# Patient Record
Sex: FEMALE | Race: WHITE | NOT HISPANIC OR LATINO | Employment: OTHER | URBAN - METROPOLITAN AREA
[De-identification: names, ages, dates, MRNs, and addresses within clinical notes are randomized per-mention and may not be internally consistent; named-entity substitution may affect disease eponyms.]

---

## 2017-01-18 ENCOUNTER — PATIENT MESSAGE (OUTPATIENT)
Dept: INTERNAL MEDICINE | Facility: CLINIC | Age: 72
End: 2017-01-18

## 2017-01-18 ENCOUNTER — OFFICE VISIT (OUTPATIENT)
Dept: INTERNAL MEDICINE | Facility: CLINIC | Age: 72
End: 2017-01-18
Payer: MEDICARE

## 2017-01-18 VITALS
OXYGEN SATURATION: 93 % | BODY MASS INDEX: 31.17 KG/M2 | HEART RATE: 100 BPM | SYSTOLIC BLOOD PRESSURE: 124 MMHG | WEIGHT: 182.56 LBS | HEIGHT: 64 IN | DIASTOLIC BLOOD PRESSURE: 74 MMHG

## 2017-01-18 DIAGNOSIS — E78.5 HYPERLIPIDEMIA, UNSPECIFIED HYPERLIPIDEMIA TYPE: ICD-10-CM

## 2017-01-18 DIAGNOSIS — N18.30 CKD (CHRONIC KIDNEY DISEASE) STAGE 3, GFR 30-59 ML/MIN: Primary | ICD-10-CM

## 2017-01-18 DIAGNOSIS — I70.0 AORTIC ATHEROSCLEROSIS: ICD-10-CM

## 2017-01-18 PROCEDURE — 1159F MED LIST DOCD IN RCRD: CPT | Mod: S$GLB,,, | Performed by: INTERNAL MEDICINE

## 2017-01-18 PROCEDURE — 99499 UNLISTED E&M SERVICE: CPT | Mod: S$GLB,,, | Performed by: INTERNAL MEDICINE

## 2017-01-18 PROCEDURE — 3074F SYST BP LT 130 MM HG: CPT | Mod: S$GLB,,, | Performed by: INTERNAL MEDICINE

## 2017-01-18 PROCEDURE — 1160F RVW MEDS BY RX/DR IN RCRD: CPT | Mod: S$GLB,,, | Performed by: INTERNAL MEDICINE

## 2017-01-18 PROCEDURE — 82570 ASSAY OF URINE CREATININE: CPT

## 2017-01-18 PROCEDURE — 99999 PR PBB SHADOW E&M-EST. PATIENT-LVL III: CPT | Mod: PBBFAC,,, | Performed by: INTERNAL MEDICINE

## 2017-01-18 PROCEDURE — 3078F DIAST BP <80 MM HG: CPT | Mod: S$GLB,,, | Performed by: INTERNAL MEDICINE

## 2017-01-18 PROCEDURE — 1125F AMNT PAIN NOTED PAIN PRSNT: CPT | Mod: S$GLB,,, | Performed by: INTERNAL MEDICINE

## 2017-01-18 PROCEDURE — 1157F ADVNC CARE PLAN IN RCRD: CPT | Mod: S$GLB,,, | Performed by: INTERNAL MEDICINE

## 2017-01-18 PROCEDURE — 99213 OFFICE O/P EST LOW 20 MIN: CPT | Mod: S$GLB,,, | Performed by: INTERNAL MEDICINE

## 2017-01-18 RX ORDER — FLUTICASONE PROPIONATE 50 MCG
SPRAY, SUSPENSION (ML) NASAL
Qty: 16 G | Refills: 12
Start: 2017-01-18 | End: 2018-02-14 | Stop reason: ALTCHOICE

## 2017-01-18 NOTE — MR AVS SNAPSHOT
[facility] Internal Medicine   [place] [address]  Phone: [phone]  Fax: [phone]                  Misa Barajas   2017 8:40 AM   Office Visit    Description:  Female : 1945   Provider:  Lang Cortez MD   Department:  [facility] Medicine           Reason for Visit     Hypertension     Sinus Drip     Nausea           Diagnoses this Visit        Comments    CKD (chronic kidney disease) stage 3, GFR 30-59 ml/min    -  Primary            To Do List           Future Appointments        Provider Department Dept Phone    2017 10:00 AM [facility] [id number] 485 LB LIMIT [facility] [phone]    2017 8:00 AM [facility] [phone]    2017 9:00 AM Lang Cortez MD [facility] [phone]      Goals (5 Years of Data)     None       These Medications        Disp Refills Start End    fluticasone (FLONASE) 50 mcg/actuation nasal spray 16 g 12 2017     2 sprays once daily.  Use as directed    Pharmacy: [facility] [place] [address] Ph #: [phone]         [facility]     [facility] Care [facility] -  Assistance  Registered nurses in the [facility] provide clinical advisement, health education, appointment booking, and other advisory services.  Call for this free service at [phone].             Medications           Message regarding Medications     Verify the changes and/or additions to your medication regime listed below are the same as discussed with your clinician today.  If any of these changes or additions are incorrect, please notify your healthcare provider.        START taking these NEW medications        Refills    fluticasone (FLONASE) 50 mcg/actuation nasal spray 12    Si sprays once daily.  Use as directed    Class: No Print           Verify that the below list of medications is an accurate  "representation of the medications you are currently taking.  If none reported, the list may be blank. If incorrect, please contact your healthcare provider. Carry this list with you in case of emergency.           Current Medications     acetaminophen (TYLENOL) 500 MG tablet Take 1 tablet (500 mg total) by mouth every 6 (six) hours as needed for Pain.    amlodipine (NORVASC) 5 MG tablet Take 1 tablet (5 mg total) by mouth once daily.    cholecalciferol, vitamin D3, 1,000 unit capsule Take 2 capsules (2,000 Units total) by mouth once daily.    fluticasone (FLONASE) 50 mcg/actuation nasal spray 2 sprays once daily.  Use as directed    losartan (COZAAR) 50 MG tablet Take 1 tablet (50 mg total) by mouth once daily.           Clinical Reference Information           Vital Signs - Last Recorded  Most recent update: 1/18/2017  8:51 AM by Lucero Kelly MA    BP Pulse Ht Wt SpO2 BMI    124/74 (BP Location: Right arm, Patient Position: Sitting, BP Method: Manual) 100 5' 4" (1.626 m) 82.8 kg (182 lb 8.7 oz) (!) 93% 31.33 kg/m2      Blood Pressure          Most Recent Value    BP  124/74      Allergies as of 1/18/2017     Adhesive      Immunizations Administered on Date of Encounter - 1/18/2017     None      Orders Placed During Today's Visit      Normal Orders This Visit    MICROALBUMIN / CREATININE RATIO URINE     Future Labs/Procedures Expected by Expires    Comprehensive metabolic panel  1/18/2017 4/18/2017    PHOSPHORUS  1/18/2017 3/19/2018    PTH, intact  1/18/2017 4/18/2017      Instructions      How to use your Flonase   - flonase is not a very good "as needed medicine" for nasal congestion  - it works best when you use it for a period of at least 7 days in A ROW.   - I want you to take this for 14 days in a row then take a 1 week break.   - After 1 week break, if congestion returns, you can start for another 14 days.   - Always take a 7 day break between uses so you don't get side effects like nose bleed or " sore throat.   - Gargle after each use of Flonase and spit out the water     Decongestants   - do not use daily, this can make your congestion worse over time   - for rapid relief of allergy congestion you can try something like Claritin-D with a decongestant. Afrin. Psuedophed. Phenylerphrine   - this is for rapid relief and should only be used as needed, less than 4 days per week.      NASAL SALINE   Washing the nasal cavities with saline reduces postnasal drainage, removes secretions, and rinses away allergens and irritants.     Saline washes can be used immediately prior to administration of other intranasal medications so that the mucosa is freshly cleansed when the medications are introduced    You can use this daily, or multiple times daily, depending on the severity of symptoms      Reasons to Call Clinic   · Start to have headache, facial pain, yellow mucous, fevers.

## 2017-01-18 NOTE — PATIENT INSTRUCTIONS
"  How to use your Flonase   - flonase is not a very good "as needed medicine" for nasal congestion  - it works best when you use it for a period of at least 7 days in A ROW.   - I want you to take this for 14 days in a row then take a 1 week break.   - After 1 week break, if congestion returns, you can start for another 14 days.   - Always take a 7 day break between uses so you don't get side effects like nose bleed or sore throat.   - Gargle after each use of Flonase and spit out the water     Decongestants   - do not use daily, this can make your congestion worse over time   - for rapid relief of allergy congestion you can try something like Claritin-D with a decongestant. Afrin. Psuedophed. Phenylerphrine   - this is for rapid relief and should only be used as needed, less than 4 days per week.      NASAL SALINE   Washing the nasal cavities with saline reduces postnasal drainage, removes secretions, and rinses away allergens and irritants.     Saline washes can be used immediately prior to administration of other intranasal medications so that the mucosa is freshly cleansed when the medications are introduced    You can use this daily, or multiple times daily, depending on the severity of symptoms      Reasons to Call Clinic   · Start to have headache, facial pain, yellow mucous, fevers.       "

## 2017-01-19 ENCOUNTER — PATIENT MESSAGE (OUTPATIENT)
Dept: INTERNAL MEDICINE | Facility: CLINIC | Age: 72
End: 2017-01-19

## 2017-01-19 NOTE — PROGRESS NOTES
Portions of this note are generated with voice recognition software. Typographical errors may exist.     SUBJECTIVE:    This is a/an 71 y.o. female here for primary care visit for  Chief Complaint   Patient presents with    Hypertension     follow up    Sinus Drip    Nausea     Sinusitis.  Patient states that she continues to suffer with postnasal drip and nausea.  States his symptoms started approximately around New Year's Sharee when she had acute upper respiratory tract infection and has had persisting symptoms since then.  She states that she does not have fevers but she has mild chills in the daytime and she worries that this might be related to persisting infection.    Hyperlipidemia.  Patient states that she did not investigate statin therapy and as such she is pre-contemplative about pursuing this therapy.      Medications Reviewed and Updated    Past medical, family, and social histories were reviewed and updated.    Review of Systems negative unless noted otherwise in history of present illness-  General ROS: negative  Allergy and Immunology ROS: negative  Hematological and Lymphatic ROS: negative  Respiratory ROS: negative    Allergic:    Review of patient's allergies indicates:   Allergen Reactions    Adhesive Rash     Pt states she removed a LATEX bandaid and the skin beneath was swollen and red.  She believe it could be a latex allergy.       OBJECTIVE:  BP: 124/74 Pulse: 100    Wt Readings from Last 3 Encounters:   01/18/17 82.8 kg (182 lb 8.7 oz)   11/18/16 83.5 kg (184 lb 1.4 oz)   11/09/16 83 kg (182 lb 15.7 oz)    Body mass index is 31.33 kg/(m^2).  Previous Blood Pressure Readings :   BP Readings from Last 3 Encounters:   01/18/17 124/74   11/18/16 136/78   11/09/16 120/70     GEN: No apparent distress  HEENT: sclera non-icteric, conjunctiva clear significant sinus pain on palpation.  Mildly edematous turbinates.  No polyposis.  CV: no peripheral edema  PULM: breathing non-labored no wheezing or  "crackles bilateral lung fields.  ABD: Obese, protuberant abdomen.  PSYCH: appropriate affect  MSK: able to rise from chair without assistance  SKIN: normal skin turgor    Pertinent Labs Reviewed       ASSESSMENT/PLAN:    Chronic rhinosinusitis.  Clinical examination reassuring.  Bacterial rhinosinusitis seems less likely.  "Chills" during the day don't appear compatible with bacterial process.  Recommend nasal corticosteroid therapy.  Chest x-ray to exclude pneumonitis.    Hyperlipidemia.  With aortic atherosclerosis Not optimally controlled.  Detailed counseling on statin therapy.  Patient will ponder this possibility further.    Future Appointments  Date Time Provider Department Center   2/16/2017 10:00 AM [facility] [id number] 485 LB LIMIT [facility] [place]   4/13/2017 8:00 AM [facility] [place]   4/18/2017 9:00 [facility]  1/18/2017  6:59 PM      "

## 2017-02-16 ENCOUNTER — HOSPITAL ENCOUNTER (OUTPATIENT)
Dept: RADIOLOGY | Facility: HOSPITAL | Age: 72
Discharge: HOME | End: 2017-02-16
Attending: UROLOGY
Payer: MEDICARE

## 2017-02-16 DIAGNOSIS — N20.0 NEPHROLITHIASIS: ICD-10-CM

## 2017-02-16 PROCEDURE — 74000 XR ABDOMEN AP 1 VIEW: CPT | Mod: TC

## 2017-02-16 PROCEDURE — 76770 US EXAM ABDO BACK WALL COMP: CPT | Mod: 26,,, | Performed by: RADIOLOGY

## 2017-02-16 PROCEDURE — 74000 XR ABDOMEN AP 1 VIEW: CPT | Mod: 26,,, | Performed by: RADIOLOGY

## 2017-02-16 PROCEDURE — 76770 US EXAM ABDO BACK WALL COMP: CPT | Mod: TC

## 2017-02-20 ENCOUNTER — PATIENT MESSAGE (OUTPATIENT)
Dept: UROLOGY | Facility: CLINIC | Age: 72
End: 2017-02-20

## 2017-02-20 ENCOUNTER — TELEPHONE (OUTPATIENT)
Dept: UROLOGY | Facility: CLINIC | Age: 72
End: 2017-02-20

## 2017-02-23 ENCOUNTER — PATIENT MESSAGE (OUTPATIENT)
Dept: UROLOGY | Facility: CLINIC | Age: 72
End: 2017-02-23

## 2017-04-04 ENCOUNTER — OFFICE VISIT (OUTPATIENT)
Dept: UROLOGY | Facility: CLINIC | Age: 72
End: 2017-04-04
Payer: MEDICARE

## 2017-04-04 ENCOUNTER — TELEPHONE (OUTPATIENT)
Dept: UROLOGY | Facility: CLINIC | Age: 72
End: 2017-04-04

## 2017-04-04 VITALS
SYSTOLIC BLOOD PRESSURE: 128 MMHG | HEART RATE: 84 BPM | HEIGHT: 64 IN | DIASTOLIC BLOOD PRESSURE: 79 MMHG | WEIGHT: 184.5 LBS | BODY MASS INDEX: 31.5 KG/M2

## 2017-04-04 DIAGNOSIS — N20.0 KIDNEY STONES: Primary | ICD-10-CM

## 2017-04-04 DIAGNOSIS — N20.0 NEPHROLITHIASIS: ICD-10-CM

## 2017-04-04 DIAGNOSIS — N18.30 CKD (CHRONIC KIDNEY DISEASE) STAGE 3, GFR 30-59 ML/MIN: Primary | ICD-10-CM

## 2017-04-04 DIAGNOSIS — N25.0 RENAL OSTEODYSTROPHY: ICD-10-CM

## 2017-04-04 DIAGNOSIS — E55.9 VITAMIN D DEFICIENCY: ICD-10-CM

## 2017-04-04 DIAGNOSIS — R82.89 ABNORMAL URINE CYTOLOGY: ICD-10-CM

## 2017-04-04 LAB
BILIRUB SERPL-MCNC: NORMAL MG/DL
BLOOD URINE, POC: 250
COLOR, POC UA: NORMAL
GLUCOSE UR QL STRIP: NORMAL
KETONES UR QL STRIP: NORMAL
LEUKOCYTE ESTERASE URINE, POC: NORMAL
NITRITE, POC UA: NORMAL
PH, POC UA: 6
PROTEIN, POC: 100
SPECIFIC GRAVITY, POC UA: 1.01
UROBILINOGEN, POC UA: NORMAL

## 2017-04-04 PROCEDURE — 81001 URINALYSIS AUTO W/SCOPE: CPT | Mod: S$GLB,,, | Performed by: UROLOGY

## 2017-04-04 PROCEDURE — 88112 CYTOPATH CELL ENHANCE TECH: CPT

## 2017-04-04 PROCEDURE — 99499 UNLISTED E&M SERVICE: CPT | Mod: S$GLB,,, | Performed by: UROLOGY

## 2017-04-04 PROCEDURE — 99999 PR PBB SHADOW E&M-EST. PATIENT-LVL III: CPT | Mod: PBBFAC,,, | Performed by: UROLOGY

## 2017-04-04 PROCEDURE — 87086 URINE CULTURE/COLONY COUNT: CPT

## 2017-04-04 PROCEDURE — 1126F AMNT PAIN NOTED NONE PRSNT: CPT | Mod: S$GLB,,, | Performed by: UROLOGY

## 2017-04-04 PROCEDURE — 1157F ADVNC CARE PLAN IN RCRD: CPT | Mod: S$GLB,,, | Performed by: UROLOGY

## 2017-04-04 PROCEDURE — 1159F MED LIST DOCD IN RCRD: CPT | Mod: S$GLB,,, | Performed by: UROLOGY

## 2017-04-04 PROCEDURE — 3078F DIAST BP <80 MM HG: CPT | Mod: S$GLB,,, | Performed by: UROLOGY

## 2017-04-04 PROCEDURE — 99214 OFFICE O/P EST MOD 30 MIN: CPT | Mod: 25,S$GLB,, | Performed by: UROLOGY

## 2017-04-04 PROCEDURE — 3074F SYST BP LT 130 MM HG: CPT | Mod: S$GLB,,, | Performed by: UROLOGY

## 2017-04-04 PROCEDURE — 88112 CYTOPATH CELL ENHANCE TECH: CPT | Mod: 26,,, | Performed by: PATHOLOGY

## 2017-04-04 PROCEDURE — 1160F RVW MEDS BY RX/DR IN RCRD: CPT | Mod: S$GLB,,, | Performed by: UROLOGY

## 2017-04-04 NOTE — PROGRESS NOTES
"Subjective:       Patient ID: Misa Barajas is a 71 y.o. Female here for follow up kidney stones    Chief Complaint: kidney stones    HPI Comments: Misa Barajas is a 71 y.o. Female here for follow up of kidney stones.  Films today personally reviewed.   Has increasing stone burden on right.   Ultrasound 2/17  "5 calculi within the right kidney measuring up to 9 mm, and one 5mm calculus within the left kidney, increased in number on the right and decreased in number on the left compared to 2015."  KUB   "Ossifications overlying both kidneys, RIGHT greater and number than LEFT presumably renal calculi."  KUB 2014 showed 5mm stone in left lower pole, stable.   KUB 10/15 shows 2 stones on left and multiple stones on right, largest is 7mm and 5mm.   KUB 1/16 and 7/16 showed multiple stones on right. Stable. No increase.       No urinary frequency or urgency. Gross hematuria x 1 recently. Painless. No incontinence.  Nocturia x 1.  Patient followed by Dr. Ross    6/14 bilateral ureteroscopy, then another 6/14 right ureteroscopy to clear right stone burden.  Repeat 24 hour urine showed high oxalate and sodium.  She has limited her diet in sodium.    Last 24 hour urine showed borderline elevated oxalate.   Stones were calcium oxalate monohydrate, althought HF were 500-600.    Had a biopsy of right ureteral area where stones were impacted. Biopsy was negative.     Former smoker. Quit 5 years ago.  1 pack per day.    KUB 2014 showed 5mm stone in left lower pole, stable.   KUB 10/15 shows 2 stones on left and multiple stones on right, largest is 7mm and 5mm.   KUB 1/16 and 7/16 showed multiple stones on right. Stable. No increase.     Films personally reviewed.       Past Medical History:   Diagnosis Date    Arthritis     knee joint pain    Breast cancer     left breast & lymph nodes-s/p sx with chemo    Bronchitis     with flu-2/2014    Cataracts, bilateral     History of chemotherapy     last treatment 12/2002 " (had 8 treatments)    Hyperlipidemia 11/20/2016    Hypertension     Renal calculi     hematuria       Past Surgical History:   Procedure Laterality Date    BREAST SURGERY Left 2003    Mastectomy    LITHOTRIPSY      MASTECTOMY      left-& lymph node dissection    ureteroscopy Bilateral     6.14       Family History   Problem Relation Age of Onset    Cancer Mother      breast cancer    Bone cancer Mother     Breast cancer Mother     Arthritis Mother     Cancer Sister      breast cancer    Breast cancer Sister     Cancer Father      LUNG    No Known Problems Brother     Fibroids Daughter     No Known Problems Daughter     Anesthesia problems Neg Hx     Glaucoma Neg Hx        Social History     Social History    Marital status:      Spouse name: N/A    Number of children: N/A    Years of education: N/A     Occupational History    Not on file.     Social History Main Topics    Smoking status: Former Smoker     Packs/day: 1.00     Years: 50.00     Types: Cigarettes     Quit date: 5/20/2009    Smokeless tobacco: Never Used    Alcohol use 0.6 oz/week     1 Glasses of wine per week      Comment: rarely    Drug use: No    Sexual activity: Not Currently     Partners: Male     Other Topics Concern    Not on file     Social History Narrative       Allergies:  Adhesive    Medications:    Current Outpatient Prescriptions:     acetaminophen (TYLENOL) 500 MG tablet, Take 1 tablet (500 mg total) by mouth every 6 (six) hours as needed for Pain., Disp: , Rfl: 0    amlodipine (NORVASC) 5 MG tablet, Take 1 tablet (5 mg total) by mouth once daily., Disp: 30 tablet, Rfl: 11    cholecalciferol, vitamin D3, 1,000 unit capsule, Take 2 capsules (2,000 Units total) by mouth once daily., Disp: , Rfl: 0    losartan (COZAAR) 50 MG tablet, Take 1 tablet (50 mg total) by mouth once daily., Disp: 90 tablet, Rfl: 3    fluticasone (FLONASE) 50 mcg/actuation nasal spray, 2 sprays once daily.  Use as directed,  Disp: 16 g, Rfl: 12        Review of Systems   Constitutional: Negative for fever and chills.   HENT: Negative for hearing loss and nosebleeds.    Eyes: Negative for visual disturbance.   Respiratory: Negative for chest tightness and shortness of breath.    Cardiovascular: Negative for chest pain.   Gastrointestinal: Negative for nausea, vomiting, constipation and blood in stool.   Genitourinary: Negative  for hematuria. Negative for dysuria, urgency and frequency.   Musculoskeletal: Negative for gait problem.   Skin: Negative for rash.   Neurological: Negative for seizures and numbness.   Hematological: Does not bruise/bleed easily.   Psychiatric/Behavioral: Negative for agitation.       Objective:      Physical Exam   Vitals reviewed.  Constitutional: She is oriented to person, place, and time. She appears well-developed and well-nourished.   HENT:   Head: Normocephalic and atraumatic.   Eyes: No scleral icterus.   Cardiovascular: Normal rate and regular rhythm.    Pulmonary/Chest: Effort normal. No respiratory distress.   Abdominal: She exhibits no mass.   No cva ttp.   Musculoskeletal: She exhibits no tenderness.   Lymphadenopathy:     She has no cervical adenopathy.   Neurological: She is alert and oriented to person, place, and time.   Skin: Skin is warm and dry. No rash noted.   Psychiatric: She has a normal mood and affect.     Urine dip 1+ leuk, 250 blood, 100 protein  Assessment:             1. Kidney stones  2. Renal insufficiency  3. Obesity  H/o of breast cancer  CKD stage 3  HTN  Plan:   Staged right eswl. All options given. (HF was 500-600, despite ca oxal monohydrate).  The patient is scheduled for ESWL. The risks and benefits of extracorporeal shock wave lithotripsy were discussed with the patient in detail.  Consent was obtained.  The risks include but are not limited to bleeding in or around the kidney, infection, pain, incomplete fragmentation of the stone requiring a repeat treatment or a  different form of treatment, obstruction of the kidney by stone particles possibly requiring a ureteral stent or nephrostomy tube, injury to or loss of the kidney ,injury to the ureter or bladder, need for further procedures, hypertension (transient or permanent), recurrence of stones, and need for open surgery.  Alternative treatments were also discussed with the patient in detail to include ureteroscopy, percutaneous treatment of the stone, open surgery  and observation. Patient understands these risks and has agreed to proceed with surgery.      Stop asa 1 week prior.   Urine culture. Repeat urine cytology.     I spent 25 minutes with the patient of which more than half was spent in direct consultation with the patient in regards to our treatment and plan.

## 2017-04-05 LAB — BACTERIA UR CULT: NORMAL

## 2017-04-13 ENCOUNTER — LAB VISIT (OUTPATIENT)
Dept: LAB | Facility: HOSPITAL | Age: 72
End: 2017-04-13
Attending: INTERNAL MEDICINE
Payer: MEDICARE

## 2017-04-13 DIAGNOSIS — N18.30 CKD (CHRONIC KIDNEY DISEASE) STAGE 3, GFR 30-59 ML/MIN: ICD-10-CM

## 2017-04-13 LAB
ALBUMIN SERPL BCP-MCNC: 3.5 G/DL
ALP SERPL-CCNC: 83 U/L
ALT SERPL W/O P-5'-P-CCNC: 19 U/L
ANION GAP SERPL CALC-SCNC: 8 MMOL/L
AST SERPL-CCNC: 23 U/L
BILIRUB SERPL-MCNC: 0.6 MG/DL
BUN SERPL-MCNC: 27 MG/DL
CALCIUM SERPL-MCNC: 9.9 MG/DL
CHLORIDE SERPL-SCNC: 109 MMOL/L
CO2 SERPL-SCNC: 27 MMOL/L
CREAT SERPL-MCNC: 1.6 MG/DL
CREAT UR-MCNC: 105 MG/DL
EST. GFR  (AFRICAN AMERICAN): 36.6 ML/MIN/1.73 M^2
EST. GFR  (NON AFRICAN AMERICAN): 31.7 ML/MIN/1.73 M^2
GLUCOSE SERPL-MCNC: 98 MG/DL
MICROALBUMIN UR DL<=1MG/L-MCNC: 100 UG/ML
MICROALBUMIN/CREATININE RATIO: 95.2 UG/MG
PHOSPHATE SERPL-MCNC: 3.4 MG/DL
POTASSIUM SERPL-SCNC: 4.5 MMOL/L
PROT SERPL-MCNC: 6.8 G/DL
PTH-INTACT SERPL-MCNC: 148 PG/ML
SODIUM SERPL-SCNC: 144 MMOL/L

## 2017-04-13 PROCEDURE — 36415 COLL VENOUS BLD VENIPUNCTURE: CPT | Mod: PO

## 2017-04-13 PROCEDURE — 84100 ASSAY OF PHOSPHORUS: CPT

## 2017-04-13 PROCEDURE — 83970 ASSAY OF PARATHORMONE: CPT

## 2017-04-13 PROCEDURE — 80053 COMPREHEN METABOLIC PANEL: CPT

## 2017-04-18 ENCOUNTER — ANESTHESIA EVENT (OUTPATIENT)
Dept: SURGERY | Facility: HOSPITAL | Age: 72
End: 2017-04-18
Payer: MEDICARE

## 2017-04-18 RX ORDER — ASPIRIN 81 MG/1
81 TABLET ORAL DAILY
COMMUNITY

## 2017-04-18 RX ORDER — AMOXICILLIN 500 MG
1 CAPSULE ORAL DAILY
COMMUNITY
End: 2022-08-22

## 2017-04-18 RX ORDER — BIOTIN 1 MG
1000 TABLET ORAL DAILY
COMMUNITY

## 2017-04-18 NOTE — ANESTHESIA PREPROCEDURE EVALUATION
Pre Admission Screening  Nisha Banda RN      []Hide copied text  Anesthesia Assessment: Preoperative EQUATION     Planned Procedure: Procedure(s) (LRB):  LITHOTRIPSY-EXTRACORPOREAL SHOCK WAVE STAGED (Right)  Requested Anesthesia Type:Monitor Anesthesia Care  Surgeon: Bonnie Knutson MD  Service: Urology  Known or anticipated Date of Surgery:4/24/2017     Surgeon notes: reviewed     Electronic QUestionnaire Assessment completed via nurse interview with patient.         NO AQ        Triage considerations:      The patient has no apparent active cardiac condition (No unstable coronary Syndrome such as severe unstable angina or recent [<1 month] myocardial infarction, decompensated CHF, severe valvular disease or significant arrhythmia)     Previous anesthesia records:GETA, LMA General, MAC and No problems   06/16/14; Placement Time: 1350; Inserted by: CRNA; Airway Device: LMA; Mask Ventilation: Easy; Intubated: Postinduction; Airway Device Size: 4.0; Cuff Inflation: Minimal occlusive pressure; Inflation Amount: 3; Placement Verified By: Auscultation, Capnometry; Complicating Factors: None; Intubation Findings: Positive EtCO2, Bilateral breath sounds, Atraumatic/Condition of teeth unchanged; Securment: Lips; Complications: None; Breath Sounds: Equal Bilateral; Insertion Attempts: 1; Removal Date: 06/16/14;   Airway/Jaw/Neck:  Airway Findings: Mouth Opening: Normal Tongue: Normal General Airway Assessment: Adult Mallampati: II TM Distance: Normal, at least 6 cm   Dental:  Dental Findings: In tact      Last PCP note: within 3 months , within [facility]   Subspecialty notes: Nephrology     Other important co-morbidities: HTN/HLP, CKD stage 3, HX breast cancer, obesity      Tests already available:  Available tests, within 1 month , within [facility] . 4/13/17 CMP. NO EKG      Instructions given. (See in Nurse's note)     Optimization:  Anesthesia Preop Clinic Assessment Indicated-not required for this  procedure      Plan:   Testing: EKG  Patient has previously scheduled Medical Appointment:none     Navigation: Tests Scheduled. Am of surgery  Straight Line to surgery.       Electronically signed by Nisha Banda RN at 4/18/2017  1:56 PM        Pre-admit on 4/24/2017              Detailed Report                                                                                                                        04/18/2017  Misa Barajas is a 71 y.o., female.    Anesthesia Evaluation    I have reviewed the Patient Summary Reports.    I have reviewed the Nursing Notes.   I have reviewed the Medications.     Review of Systems  Anesthesia Hx:  No problems with previous Anesthesia  History of prior surgery of interest to airway management or planning: Previous anesthesia: General 2014 lithotripsy with general anesthesia.  Procedure performed at an [facility]. Airway issues documented on chart review include laryngeal mask airway used  Denies Family Hx of Anesthesia complications.   Denies Personal Hx of Anesthesia complications.   Hematology/Oncology:         -- Cancer in past history: Breast   Cardiovascular:   Hypertension ECG has been reviewed.    Pulmonary:  Pulmonary Normal    Renal/:   Chronic Renal Disease    Musculoskeletal:   Arthritis     Neurological:   Neuromuscular Disease,        Physical Exam  General:  Obesity, Well nourished    Airway/Jaw/Neck:  Airway Findings: Mouth Opening: Normal Tongue: Normal  General Airway Assessment: Adult  Mallampati: III  Improves to II with phonation.  TM Distance: Normal, at least 6 cm      Dental:  Dental Findings: In tact   Chest/Lungs:  Chest/Lungs Findings: Clear to auscultation     Heart/Vascular:  Heart Findings: Rate: Normal  Rhythm: Regular Rhythm  Sounds: Normal        Mental Status:  Mental Status Findings:  Cooperative, Alert and Oriented         Anesthesia Plan  Type of Anesthesia, risks & benefits discussed:  Anesthesia Type:  general  Patient's  Preference:   Intra-op Monitoring Plan:   Intra-op Monitoring Plan Comments:   Post Op Pain Control Plan:   Post Op Pain Control Plan Comments:   Induction:   IV  Beta Blocker:  Patient is not currently on a Beta-Blocker (No further documentation required).       Informed Consent: Patient understands risks and agrees with Anesthesia plan.  Questions answered. Anesthesia consent signed with patient.  ASA Score: 3     Day of Surgery Review of History & Physical:    H&P update referred to the surgeon.         Ready For Surgery From Anesthesia Perspective.

## 2017-04-18 NOTE — PRE ADMISSION SCREENING
Anesthesia Assessment: Preoperative EQUATION    Planned Procedure: Procedure(s) (LRB):  LITHOTRIPSY-EXTRACORPOREAL SHOCK WAVE STAGED (Right)  Requested Anesthesia Type:Monitor Anesthesia Care  Surgeon: Bonnie Knutson MD  Service: Urology  Known or anticipated Date of Surgery:4/24/2017    Surgeon notes: reviewed    Electronic QUestionnaire Assessment completed via nurse interview with patient.        NO AQ      Triage considerations:     The patient has no apparent active cardiac condition (No unstable coronary Syndrome such as severe unstable angina or recent [<1 month] myocardial infarction, decompensated CHF, severe valvular   disease or significant arrhythmia)    Previous anesthesia records:GETA, LMA General, MAC and No problems   06/16/14; Placement Time: 1350; Inserted by: CRNA; Airway Device: LMA; Mask Ventilation: Easy; Intubated: Postinduction; Airway Device Size: 4.0; Cuff Inflation: Minimal occlusive pressure; Inflation Amount: 3; Placement Verified By: Auscultation, Capnometry; Complicating Factors: None; Intubation Findings: Positive EtCO2, Bilateral breath sounds, Atraumatic/Condition of teeth unchanged; Securment: Lips; Complications: None; Breath Sounds: Equal Bilateral; Insertion Attempts: 1; Removal Date: 06/16/14;   Airway/Jaw/Neck:  Airway Findings: Mouth Opening: Normal Tongue: Normal General Airway Assessment: Adult Mallampati: II TM Distance: Normal, at least 6 cm     Dental:  Dental Findings: In tact          Last PCP note: within 3 months , within [facility]   Subspecialty notes: Nephrology    Other important co-morbidities: HTN/HLP, CKD stage 3, HX breast cancer     Tests already available:  Available tests,  within 1 month , within [facility] . 4/13/17 CMP. NO EKG            Instructions given. (See in Nurse's note)    Optimization:  Anesthesia Preop Clinic Assessment  Indicated-not required for this procedure        Plan:    Testing:  EKG     Patient  has previously scheduled Medical  Appointment:none    Navigation: Tests Scheduled. Am of surgery                       Straight Line to surgery.

## 2017-04-21 ENCOUNTER — TELEPHONE (OUTPATIENT)
Dept: UROLOGY | Facility: CLINIC | Age: 72
End: 2017-04-21

## 2017-04-21 NOTE — TELEPHONE ENCOUNTER
Called pt to confirm 9am arrival time for procedure. Gave pt NPO instructions and gave pt opportunity to ask questions. Pt verbalized understanding.

## 2017-04-24 ENCOUNTER — ANESTHESIA (OUTPATIENT)
Dept: SURGERY | Facility: HOSPITAL | Age: 72
End: 2017-04-24
Payer: MEDICARE

## 2017-04-24 ENCOUNTER — HOSPITAL ENCOUNTER (OUTPATIENT)
Facility: HOSPITAL | Age: 72
Discharge: HOME | End: 2017-04-24
Attending: UROLOGY | Admitting: UROLOGY
Payer: MEDICARE

## 2017-04-24 ENCOUNTER — TELEPHONE (OUTPATIENT)
Dept: UROLOGY | Facility: CLINIC | Age: 72
End: 2017-04-24

## 2017-04-24 DIAGNOSIS — Z01.818 PREOPERATIVE TESTING: ICD-10-CM

## 2017-04-24 DIAGNOSIS — N20.0 KIDNEY STONES: Primary | ICD-10-CM

## 2017-04-24 PROCEDURE — 93010 ELECTROCARDIOGRAM REPORT: CPT | Mod: ,,, | Performed by: INTERNAL MEDICINE

## 2017-04-24 PROCEDURE — 25000003 PHARM REV CODE 250: Performed by: UROLOGY

## 2017-04-24 PROCEDURE — 71000016 HC POSTOP RECOV ADDL HR: Performed by: UROLOGY

## 2017-04-24 PROCEDURE — D9220A PRA ANESTHESIA: Mod: ANES,,, | Performed by: ANESTHESIOLOGY

## 2017-04-24 PROCEDURE — 36000705 HC OR TIME LEV I EA ADD 15 MIN: Performed by: UROLOGY

## 2017-04-24 PROCEDURE — 71000015 HC POSTOP RECOV 1ST HR: Performed by: UROLOGY

## 2017-04-24 PROCEDURE — 37000009 HC ANESTHESIA EA ADD 15 MINS: Performed by: UROLOGY

## 2017-04-24 PROCEDURE — D9220A PRA ANESTHESIA: Mod: CRNA,,, | Performed by: NURSE ANESTHETIST, CERTIFIED REGISTERED

## 2017-04-24 PROCEDURE — 50590 FRAGMENTING OF KIDNEY STONE: CPT | Mod: RT,,, | Performed by: UROLOGY

## 2017-04-24 PROCEDURE — 93005 ELECTROCARDIOGRAM TRACING: CPT

## 2017-04-24 PROCEDURE — 36000704 HC OR TIME LEV I 1ST 15 MIN: Performed by: UROLOGY

## 2017-04-24 PROCEDURE — 25000003 PHARM REV CODE 250: Performed by: NURSE ANESTHETIST, CERTIFIED REGISTERED

## 2017-04-24 PROCEDURE — 63600175 PHARM REV CODE 636 W HCPCS: Performed by: NURSE ANESTHETIST, CERTIFIED REGISTERED

## 2017-04-24 PROCEDURE — 63600175 PHARM REV CODE 636 W HCPCS: Performed by: UROLOGY

## 2017-04-24 PROCEDURE — 37000008 HC ANESTHESIA 1ST 15 MINUTES: Performed by: UROLOGY

## 2017-04-24 RX ORDER — SODIUM CHLORIDE 9 MG/ML
INJECTION, SOLUTION INTRAVENOUS CONTINUOUS
Status: DISCONTINUED | OUTPATIENT
Start: 2017-04-24 | End: 2017-04-24 | Stop reason: SDUPTHER

## 2017-04-24 RX ORDER — ONDANSETRON 2 MG/ML
INJECTION INTRAMUSCULAR; INTRAVENOUS
Status: DISCONTINUED | OUTPATIENT
Start: 2017-04-24 | End: 2017-04-24

## 2017-04-24 RX ORDER — LIDOCAINE HCL/PF 100 MG/5ML
SYRINGE (ML) INTRAVENOUS
Status: DISCONTINUED | OUTPATIENT
Start: 2017-04-24 | End: 2017-04-24

## 2017-04-24 RX ORDER — SODIUM CHLORIDE, SODIUM LACTATE, POTASSIUM CHLORIDE, CALCIUM CHLORIDE 600; 310; 30; 20 MG/100ML; MG/100ML; MG/100ML; MG/100ML
INJECTION, SOLUTION INTRAVENOUS CONTINUOUS PRN
Status: DISCONTINUED | OUTPATIENT
Start: 2017-04-24 | End: 2017-04-24

## 2017-04-24 RX ORDER — HYDROCODONE BITARTRATE AND ACETAMINOPHEN 5; 325 MG/1; MG/1
2 TABLET ORAL EVERY 6 HOURS PRN
Qty: 31 TABLET | Refills: 0 | Status: SHIPPED | OUTPATIENT
Start: 2017-04-24 | End: 2017-04-24

## 2017-04-24 RX ORDER — KETAMINE HYDROCHLORIDE 100 MG/ML
INJECTION, SOLUTION INTRAMUSCULAR; INTRAVENOUS
Status: DISCONTINUED | OUTPATIENT
Start: 2017-04-24 | End: 2017-04-24

## 2017-04-24 RX ORDER — SODIUM CHLORIDE 9 MG/ML
INJECTION, SOLUTION INTRAVENOUS CONTINUOUS
Status: DISCONTINUED | OUTPATIENT
Start: 2017-04-24 | End: 2017-04-24 | Stop reason: HOSPADM

## 2017-04-24 RX ORDER — HYDROCODONE BITARTRATE AND ACETAMINOPHEN 5; 325 MG/1; MG/1
2 TABLET ORAL EVERY 6 HOURS PRN
Qty: 31 TABLET | Refills: 0 | Status: SHIPPED | OUTPATIENT
Start: 2017-04-24 | End: 2017-05-04

## 2017-04-24 RX ORDER — CEFAZOLIN SODIUM 2 G/50ML
2 SOLUTION INTRAVENOUS
Status: COMPLETED | OUTPATIENT
Start: 2017-04-24 | End: 2017-04-24

## 2017-04-24 RX ORDER — CEFAZOLIN SODIUM 1 G/50ML
SOLUTION INTRAVENOUS
Status: DISCONTINUED
Start: 2017-04-24 | End: 2017-04-24 | Stop reason: HOSPADM

## 2017-04-24 RX ORDER — LIDOCAINE HYDROCHLORIDE 10 MG/ML
1 INJECTION, SOLUTION EPIDURAL; INFILTRATION; INTRACAUDAL; PERINEURAL ONCE
Status: DISCONTINUED | OUTPATIENT
Start: 2017-04-24 | End: 2017-04-24 | Stop reason: HOSPADM

## 2017-04-24 RX ORDER — LIDOCAINE HYDROCHLORIDE 10 MG/ML
INJECTION, SOLUTION EPIDURAL; INFILTRATION; INTRACAUDAL; PERINEURAL
Status: DISCONTINUED
Start: 2017-04-24 | End: 2017-04-24 | Stop reason: HOSPADM

## 2017-04-24 RX ORDER — MIDAZOLAM HYDROCHLORIDE 1 MG/ML
INJECTION, SOLUTION INTRAMUSCULAR; INTRAVENOUS
Status: DISCONTINUED | OUTPATIENT
Start: 2017-04-24 | End: 2017-04-24

## 2017-04-24 RX ORDER — PROPOFOL 10 MG/ML
VIAL (ML) INTRAVENOUS CONTINUOUS PRN
Status: DISCONTINUED | OUTPATIENT
Start: 2017-04-24 | End: 2017-04-24

## 2017-04-24 RX ORDER — TAMSULOSIN HYDROCHLORIDE 0.4 MG/1
0.4 CAPSULE ORAL DAILY
Qty: 30 CAPSULE | Refills: 0 | Status: SHIPPED | OUTPATIENT
Start: 2017-04-24 | End: 2018-02-14 | Stop reason: ALTCHOICE

## 2017-04-24 RX ORDER — FENTANYL CITRATE 50 UG/ML
INJECTION, SOLUTION INTRAMUSCULAR; INTRAVENOUS
Status: DISCONTINUED | OUTPATIENT
Start: 2017-04-24 | End: 2017-04-24

## 2017-04-24 RX ADMIN — KETAMINE HYDROCHLORIDE 25 MG: 100 INJECTION, SOLUTION, CONCENTRATE INTRAMUSCULAR; INTRAVENOUS at 10:04

## 2017-04-24 RX ADMIN — SODIUM CHLORIDE, SODIUM GLUCONATE, SODIUM ACETATE, POTASSIUM CHLORIDE, MAGNESIUM CHLORIDE, SODIUM PHOSPHATE, DIBASIC, AND POTASSIUM PHOSPHATE: .53; .5; .37; .037; .03; .012; .00082 INJECTION, SOLUTION INTRAVENOUS at 11:04

## 2017-04-24 RX ADMIN — CEFAZOLIN SODIUM 2 G: 2 SOLUTION INTRAVENOUS at 10:04

## 2017-04-24 RX ADMIN — FENTANYL CITRATE 50 MCG: 50 INJECTION, SOLUTION INTRAMUSCULAR; INTRAVENOUS at 10:04

## 2017-04-24 RX ADMIN — ONDANSETRON 4 MG: 2 INJECTION INTRAMUSCULAR; INTRAVENOUS at 10:04

## 2017-04-24 RX ADMIN — MIDAZOLAM HYDROCHLORIDE 2 MG: 1 INJECTION, SOLUTION INTRAMUSCULAR; INTRAVENOUS at 09:04

## 2017-04-24 RX ADMIN — PROPOFOL 50 MCG/KG/MIN: 10 INJECTION, EMULSION INTRAVENOUS at 10:04

## 2017-04-24 RX ADMIN — SODIUM CHLORIDE: 0.9 INJECTION, SOLUTION INTRAVENOUS at 09:04

## 2017-04-24 RX ADMIN — FENTANYL CITRATE 50 MCG: 50 INJECTION, SOLUTION INTRAMUSCULAR; INTRAVENOUS at 09:04

## 2017-04-24 RX ADMIN — LIDOCAINE HYDROCHLORIDE 100 MG: 20 INJECTION, SOLUTION INTRAVENOUS at 10:04

## 2017-04-24 NOTE — ANESTHESIA RELEASE NOTE
Anesthesia Release from PACU Note    Patient name: Misa Barajas    Procedure(s): Procedure(s) (LRB):  LITHOTRIPSY-EXTRACORPOREAL SHOCK WAVE STAGED (Right)    Anesthesia type: general    Post pain: adequate analgesia    Post assessment: no apparent complications    Last vitals:   Vitals:    04/24/17 1135   BP: 117/62   Pulse: 74   Resp: 18   Temp:        Post vital signs: stable    Level of consciousness: alert     Nausea/Vomiting: no nausea/no vomiting    Complications: none    Airway Patency:  patent    Respiratory: unassisted    Cardiovascular: stable and blood pressure at baseline    Hydration: euvolemic

## 2017-04-24 NOTE — OP NOTE
[facility]  Operative Note    Date: 04/24/2017    Pre-Op Diagnosis: Right nephrolithiasis    Post-Op Diagnosis: same    Procedure(s) Performed:   1.  Right ESWL    Specimen(s): none    Staff Surgeon: Bonnie Knutson MD    Assistant Surgeon: Apple Cotto MD    Anesthesia: Monitored Local Anesthesia with Sedation    Indications: Misa Barajas is a 71 y.o. female with multiple  right renal stones and a left renal stone presenting for definitive stone management. 500-600 HU for each stone, reasonable skin to stone distance. The stone is radio-opaque on KUB.      Findings:   Multiple radio-opaque stones  Patient had PVCs requiring synchronization  All stones in the right kidney were not visible after ESWL was completed      Estimated Blood Loss: none    Drains: none    Procedure in Detail:  After risk, benefits, and possible complications of ESWL were discussed, the patient elected to undergo the procedure and informed consent was obtained. All questions were answered in the pre-operative area and the patient was transferred to the lithotripsy suite and placed on the lithotriptor table. SCDs were applied and working.  Time out was preformed, magnolia-procedural antibiotics were administered.    The patient's Right-sided stones (Upper, middle and lower) were individually aligned on the X-Y- and Z axis.  MAC anesthesia was administered.  When the patient was adequately sedated, 3000 thousand shocks were distributed at a rate of 60 shocks per second, beginning at 16 KV of power and advanced to 26 KV. Intermittent fluoroscopy was used to monitor fragmentation of the stone.    At the end of the procedure the stones were no longer visible.      The patient tolerated the procedure well and was transferred to the PACU in stable condition.      Plan: The patient will follow up with Dr. Knutson to schedule left ESWL and follow up KUB.     MD Dr. Eamon Pope was present for all critical  portions of the procedure.

## 2017-04-24 NOTE — INTERVAL H&P NOTE
The patient has been examined and the H&P has been reviewed:    I concur with the findings and no changes have occurred since H&P was written.    Anesthesia/Surgery risks, benefits and alternative options discussed and understood by patient/family.          Active Hospital Problems    Diagnosis  POA    Preoperative testing [Z01.818]  Not Applicable      Resolved Hospital Problems    Diagnosis Date Resolved POA   No resolved problems to display.

## 2017-04-24 NOTE — DISCHARGE INSTRUCTIONS
Home Care Instructions  LITHOTRIPSY  (E.S.W.L)    ACTIVITY LEVEL:  If you received sedation and/or an anesthetic, you may feel sleepy for several hours. Rest until you feel more  awake. Gradually resume your normal activities. Please do not do any vigorous exercising such as jogging,  biking, tennis, etc. until your doctor approves this. Moderate exercise, such as walking, may aid the passage of  fragments.  DIET:  At home, continue with liquids. If there is no nausea, you may eat a soft diet and gradually resume your normal  diet. Drink plenty of liquids during the next 72 hours to help pass the deep particles. You must drink at least  two liters per day for two weeks.  BATHING:  You may bathe or shower as desired.  CARE:  1. Please strain all urine each time you urinate until your return visit. Bring fragments with you to the  Clinic on your next visit. The nurse should give a specimen cup and strainer to you before you leave to  store these particles.  2. You may experience some pain as you pass the stone fragments.  3. It is normal to see some bloody urine or small clots after this procedure.  MEDICATIONS:  You will receive instructions for any pain and/or antibiotic prescriptions. Pain medication should be taken only  if needed, and as directed. Antibiotics should be taken as directed until the entire prescription is completed.    WHEN TO CALL THE DOCTOR:   If you are unable to urinate   Fever over 101ºF (38.4ºC)   If you pass large clots of blood when urinating   Persistent pain not relieved by the pain medication  RETURN APPOINTMENT:   FOR EMERGENCIES:  If any unusual problems or difficulties occur, contact Dr. Knutson or the resident at  [phone] (page ) or at the Clinic office, [phone].

## 2017-04-24 NOTE — TRANSFER OF CARE
"Anesthesia Transfer of Care Note    Patient: Misa Barajas    Procedure(s) Performed: Procedure(s) (LRB):  LITHOTRIPSY-EXTRACORPOREAL SHOCK WAVE STAGED (Right)    Patient location: PACU    Anesthesia Type: MAC    Transport from OR: Transported from OR on room air with adequate spontaneous ventilation    Post pain: adequate analgesia    Post assessment: no apparent anesthetic complications and tolerated procedure well    Post vital signs: stable    Level of consciousness: awake and sedated    Nausea/Vomiting: no nausea/vomiting    Complications: none          Last vitals:   Visit Vitals    BP (!) 148/79 (BP Location: Right arm, Patient Position: Lying, BP Method: Automatic)    Pulse 89    Temp 36.5 °C (97.7 °F) (Skin)    Resp 16    Ht 5' 4" (1.626 m)    Wt 81.6 kg (180 lb)    SpO2 98%    Breastfeeding No    BMI 30.9 kg/m2     "

## 2017-04-24 NOTE — DISCHARGE SUMMARY
[facility]  Discharge Summary for Outpatient Procedure    Date of Admission: 04/24/2017    Date of Discharge: 04/24/2017    Principal Diagnosis: bilateral nephrolithiasis    Other Secondary Diagnoses:    Past Medical History:   Diagnosis Date    Arthritis     knee joint pain    Breast cancer     left breast & lymph nodes-s/p sx with chemo    Bronchitis     with flu-2/2014    Cataracts, bilateral     History of chemotherapy     last treatment 12/2002 (had 8 treatments)    Hyperlipidemia 11/20/2016    Hypertension     Renal calculi     hematuria       Procedure(s) Performed:   R ESWL    Hospital Course: Misa Barajas is a 71 y.o. female with bilateral renal stones.  He underwent the above procedure(s) today (see operative note for full details).  There were no complications.      Medications:    1.  flomax  2.  norco  3.  Restart home medications    Disposition: home/self care in stable condition    Follow up:  Follow-up with Dr. Knutson to schedule second ESWL for left sided stone.     Apple Cotto MD

## 2017-04-24 NOTE — IP AVS SNAPSHOT
[facility]  [address]  [facility] [address]  Phone: [phone]           Patient Discharge Instructions   Our goal is to set you up for success. This packet includes information on your condition, medications, and your home care.  It will help you care for yourself to prevent having to return to the hospital.     Please ask your nurse if you have any questions.      There are many details to remember when preparing to leave the hospital. Here is what you will need to do:    1. Take your medicine. If you are prescribed medications, review your Medication List on the following pages. You may have new medications to  at the pharmacy and others that you'll need to stop taking. Review the instructions for how and when to take your medications. Talk with your doctor or nurses if you are unsure of what to do.     2. Go to your follow-up appointments. Specific follow-up information is listed in the following pages. Your may be contacted by a nurse or clinical provider about future appointments. Be sure we have all of the phone numbers to reach you. Please contact your provider's office if you are unable to make an appointment.     3. Watch for warning signs. Your doctor or nurse will give you detailed warning signs to watch for and when to call for assistance. These instructions may also include educational information about your condition. If you experience any of warning signs to your health, call your doctor.           [facility]  Unless otherwise directed by your provider, please   contact [facility], our nurse care line   that is available for 24/7 assistance.     [phone] (toll-free)     Registered nurses in the [facility]   provide: appointment scheduling, clinical advisement, health education, and other advisory services.                  ** Verify the list of medication(s) below is accurate and up to date. Carry this with you in case of  emergency. If your medications have changed, please notify your healthcare provider.             Medication List      START taking these medications        Additional Info                      hydrocodone-acetaminophen 5-325mg 5-325 mg per tablet   Commonly known as:  NORCO   Quantity:  31 tablet   Refills:  0   Dose:  2 tablet    Instructions:  Take 2 tablets by mouth every 6 (six) hours as needed for Pain.     Begin Date    AM    Noon    PM    Bedtime       tamsulosin 0.4 mg Cp24   Commonly known as:  FLOMAX   Quantity:  30 capsule   Refills:  0   Dose:  0.4 mg    Instructions:  Take 1 capsule (0.4 mg total) by mouth once daily.     Begin Date    AM    Noon    PM    Bedtime         CHANGE how you take these medications        Additional Info                      amlodipine 5 MG tablet   Commonly known as:  NORVASC   Quantity:  30 tablet   Refills:  11   Dose:  5 mg   What changed:  when to take this    Instructions:  Take 1 tablet (5 mg total) by mouth once daily.     Begin Date    AM    Noon    PM    Bedtime         CONTINUE taking these medications        Additional Info                      acetaminophen 500 MG tablet   Commonly known as:  TYLENOL   Refills:  0   Dose:  500 mg    Instructions:  Take 1 tablet (500 mg total) by mouth every 6 (six) hours as needed for Pain.     Begin Date    AM    Noon    PM    Bedtime       aspirin 81 MG EC tablet   Commonly known as:  ECOTRIN   Refills:  0   Dose:  81 mg    Instructions:  Take 81 mg by mouth once daily.     Begin Date    AM    Noon    PM    Bedtime       biotin 1 mg tablet   Refills:  0   Dose:  1000 mcg    Instructions:  Take 1,000 mcg by mouth 3 (three) times daily.     Begin Date    AM    Noon    PM    Bedtime       cholecalciferol (vitamin D3) 1,000 unit capsule   Refills:  0   Dose:  2000 Units    Instructions:  Take 2 capsules (2,000 Units total) by mouth once daily.     Begin Date    AM    Noon    PM    Bedtime       fish oil-omega-3 fatty acids 300-1,000  mg capsule   Refills:  0   Dose:  2 g    Instructions:  Take 2 g by mouth once daily.     Begin Date    AM    Noon    PM    Bedtime       fluticasone 50 mcg/actuation nasal spray   Commonly known as:  FLONASE   Quantity:  16 g   Refills:  12    Instructions:  2 sprays once daily.  Use as directed     Begin Date    AM    Noon    PM    Bedtime       GARLIC ORAL   Refills:  0    Instructions:  Take by mouth.     Begin Date    AM    Noon    PM    Bedtime       losartan 50 MG tablet   Commonly known as:  COZAAR   Quantity:  90 tablet   Refills:  3   Dose:  50 mg    Instructions:  Take 1 tablet (50 mg total) by mouth once daily.     Begin Date    AM    Noon    PM    Bedtime       VITAMIN B COMPLEX NO.12-NIACIN ORAL   Refills:  0    Instructions:  Take by mouth.     Begin Date    AM    Noon    PM    Bedtime            Where to Get Your Medications      You can get these medications from any pharmacy     Bring a paper prescription for each of these medications     hydrocodone-acetaminophen 5-325mg 5-325 mg per tablet    tamsulosin 0.4 mg Cp24                  Please bring to all follow up appointments:    1. A copy of your discharge instructions.  2. All medicines you are currently taking in their original bottles.  3. Identification and insurance card.    Please arrive 15 minutes ahead of scheduled appointment time.    Please call 24 hours in advance if you must reschedule your appointment and/or time.        Your Scheduled Appointments     Apr 27, 2017  8:20 AM CDT   Established Patient Visit with Lang Cortez MD   [place] [facility] ([facility])    [address]  [place] [address]   [phone]            May 03, 2017 10:00 AM CDT   Established Patient Visit with DO Isaias Mccarty - Nephrology ([facility] )    [address]  [facility] [id number] [phone]              Follow-up Information     Follow up with Bonnie Knutson MD In 4 weeks.     Specialty:  Urology    Contact information:    [address]  [facility] [address]  [phone]          Discharge Instructions     Future Orders    X-Ray Abdomen AP 1 View     Questions:    Reason for Exam:  nephrolithiasis    Activity as tolerated     Call MD for:  difficulty breathing or increased cough     Call MD for:  increased confusion or weakness     Call MD for:  persistent dizziness, light-headedness, or visual disturbances     Call MD for:  persistent nausea and vomiting or diarrhea     Call MD for:  redness, tenderness, or signs of infection (pain, swelling, redness, odor or green/yellow discharge around incision site)     Call MD for:  severe persistent headache     Call MD for:  severe uncontrolled pain     Call MD for:  worsening rash     Diet general     Questions:    Total calories:      Fat restriction, if any:      Protein restriction, if any:      Na restriction, if any:      Fluid restriction:      Additional restrictions:      No dressing needed         Discharge Instructions       Home Care Instructions  LITHOTRIPSY  (E.S.W.L)    ACTIVITY LEVEL:  If you received sedation and/or an anesthetic, you may feel sleepy for several hours. Rest until you feel more  awake. Gradually resume your normal activities. Please do not do any vigorous exercising such as jogging,  biking, tennis, etc. until your doctor approves this. Moderate exercise, such as walking, may aid the passage of  fragments.  DIET:  At home, continue with liquids. If there is no nausea, you may eat a soft diet and gradually resume your normal  diet. Drink plenty of liquids during the next 72 hours to help pass the deep particles. You must drink at least  two liters per day for two weeks.  BATHING:  You may bathe or shower as desired.  CARE:  1. Please strain all urine each time you urinate until your return visit. Bring fragments with you to the  Clinic on your next visit. The nurse should give a specimen cup and strainer to  "you before you leave to  store these particles.  2. You may experience some pain as you pass the stone fragments.  3. It is normal to see some bloody urine or small clots after this procedure.  MEDICATIONS:  You will receive instructions for any pain and/or antibiotic prescriptions. Pain medication should be taken only  if needed, and as directed. Antibiotics should be taken as directed until the entire prescription is completed.    WHEN TO CALL THE DOCTOR:   If you are unable to urinate   Fever over 101ºF (38.4ºC)   If you pass large clots of blood when urinating   Persistent pain not relieved by the pain medication  RETURN APPOINTMENT:   FOR EMERGENCIES:  If any unusual problems or difficulties occur, contact Dr. Knutson or the resident at  [phone] (page ) or at the Clinic office, [phone].        Primary Diagnosis     Your primary diagnosis was:  Preoperative Testing      Admission Information     Date & Time Provider Department CSN    4/24/2017  8:48 AM Bonnie Knutson MD [facility] [id number]      Care Providers     Provider Role Specialty Primary office phone    Bonnie Knutson MD Attending Provider Urology [phone]    Bonnie Knutson MD Surgeon  Urology [phone]      Your Vitals Were     BP Pulse Temp Resp Height Weight    104/57 76 97.3 °F (36.3 °C) (Temporal) 18 5' 4" (1.626 m) 81.6 kg (180 lb)    SpO2 BMI             96% 30.9 kg/m2         Recent Lab Values     No lab values to display.      Allergies as of 4/24/2017        Reactions    Adhesive Rash    Pt states she removed a LATEX bandaid and the skin beneath was swollen and red. No other latex causes a reaction.      Advance Directives     An advance directive is a document which, in the event you are no longer able to make decisions for yourself, tells your healthcare team what kind of treatment you do or do not want to receive, or who you would like to make those decisions for " you.  If you do not currently have an advance directive, [facility] encourages you to create one.  For more information call:  [phone]), [phone] ([phone]),  or log on to [email or web address].        Smoking Cessation     If you would like to quit smoking:   You may be eligible for free services if you are a [place] resident and started smoking cigarettes before September 1, 1988.  Call the Smoking Cessation Trust (SCT) toll free at (168) 091-4755 or (926) 062-9294.   Call 0-694-QUIT-NOW if you do not meet the above criteria.   Contact us via email: [email or web address]   View our website for more information: [email or web address]        Language Assistance Services     ATTENTION: Language assistance services are available, free of charge. Please call [phone].      ATENCIÓN: Si habla español, tiene a andrade disposición servicios gratuitos de asistencia lingüística. Llame al [phone].     CHÚ Ý: N?u b?n nói Ti?ng Vi?t, có các d?ch v? h? tr? ngôn ng? mi?n phí dành cho b?n. G?i s? [phone].        Heart Failure Education       Heart Failure: Being Active  You have a condition called heart failure. Being active doesnt mean that you have to wear yourself out. Even a little movement each day helps to strengthen your heart. If you cant get out to exercise, you can do simple stretching and strengthening exercises at home. These are good ways to keep you well-conditioned and prevent you and your heart from becoming excessively weak.    Ideas to get you started  · Add a little movement to things you do now. Walk to mail letters. Park your car at the far end of the parking lot and walk to the store. Walk up a flight of stairs instead of taking the elevator.  · Choose activities you enjoy. You might walk, swim, or ride an exercise bike. Things like gardening and washing the car count, too. Other possibilities include: washing dishes, walking the dog, walking around  the mall, and doing aerobic activities with friends.  · Join a group exercise program at a [facility], a senior center, or a community center. Or look into a hospital cardiac rehabilitation program. Ask your doctor if you qualify.  Tips to keep you going  · Get up and get dressed each day. Go to a coffee shop and read a newspaper or go somewhere that you'll be in the presence of other active people. Youll feel more like being active.  · Make a plan. Choose one or more activities that you enjoy and that you can easily do. Then plan to do at least one each day. You might write your plan on a calendar.  · Go with a friend or a group if you like company. This can help you feel supported and stay motivated, too.  · Plan social events that you enjoy. This will keep you mentally engaged as well as physically motivated to do things you find pleasure in.  For your safety  · Talk with your healthcare provider before starting an exercise program.  · Exercise indoors when its too hot or too cold outside, or when the air quality is poor. Try walking at a shopping mall.  · Wear socks and sturdy shoes to maintain your balance and prevent falls.  · Start slowly. Do a few minutes several times a day at first. Increase your time and speed little by little.  · Stop and rest whenever you feel tired or get short of breath.  · Dont push yourself on days when you dont feel well.  Date Last Reviewed: 3/20/2016  © 3743-8175 [employer]. [address] [place] [address], [place] [address]. All rights reserved. This information is not intended as a substitute for professional medical care. Always follow your healthcare professional's instructions.              Heart Failure: Evaluating Your Heart  You have a condition called heart failure. To evaluate your condition, your doctor will examine you, ask questions, and do some tests. Along with looking for signs of heart failure, the doctor looks for any other health problems that may  have led to heart failure. The results of your evaluation will help your doctor form a treatment plan.  Health history and physical exam  Your visit will start with a health history. Tell the doctor about any symptoms youve noticed and about all medicines you take. Then youll have a physical exam. This includes listening to your heartbeat and breathing. Youll also be checked for swelling (edema) in your legs and neck. When you have fluid buildup or fluid in the lungs, it may be called congestive heart failure.  Diagnosing heart failure     During an echocardiogram, sound waves bounce off the heart. These are converted into a picture on the screen.   The following may be done to help your doctor form a diagnosis:  · X-rays show the size and shape of your heart. These pictures can also show fluid in your lungs.  · An electrocardiogram (ECG or EKG) shows the pattern of your heartbeat. Small pads (electrodes) are placed on your chest, arms, and legs. Wires connect the pads to the ECG machine, which records your hearts electrical signals. This can give the doctor information about heart function.  · An echocardiogram uses ultrasound waves to show the structure and movement of your heart muscle. This shows how well the heart pumps. It also shows the thickness of the heart walls, and if the heart is enlarged. It is one of the most useful, non-invasive tests as it provides information about the heart's general function. This helps your doctor make treatment decisions.  · Lab tests evaluate small amounts of blood or urine for signs of problems. A BNP lab test can help diagnose and evaluate heart failure. BNP stands for B-type natriuretic peptide. The ventricles secrete more BNP when heart failure worsens. Lab tests can also provide information about metabolic dysfunction or heart dysfunction.  Your treatment plan  Based on the results of your evaluation and tests, your doctor will develop a treatment plan. This plan is  designed to relieve some of your heart failure symptoms and help make you more comfortable. Your treatment plan may include:  · Medicine to help your heart work better and improve your quality of life  · Changes in what you eat and drink to help prevent fluid from backing up in your body  · Daily monitoring of your weight and heart failure symptoms to see how well your treatment plan is working  · Exercise to help you stay healthy  · Help with quitting smoking  · Emotional and psychological support to help adjust to the changes  · Referrals to other specialists to make sure you are being treated comprehensively  Date Last Reviewed: 3/21/2016  © 8288-2325 [employer]. [address] [place] [address]. All rights reserved. This information is not intended as a substitute for professional medical care. Always follow your healthcare professional's instructions.              Heart Failure: Making Changes to Your Diet  You have a condition called heart failure. When you have heart failure, excess fluid is more likely to build up in your body because your heart isn't working well. This makes the heart work harder to pump blood. Fluid buildup causes symptoms such as shortness of breath and swelling (edema). This is often referred to as congestive heart failure or CHF. Controlling the amount of salt (sodium) you eat may help stop fluid from building up. Your doctor may also tell you to reduce the amount of fluid you drink.  Reading food labels    Your healthcare provider will tell you how much sodium you can eat each day. Read food labels to keep track. Keep in mind that certain foods are high in salt. These include canned, frozen, and processed foods. Check the amount of sodium in each serving. Watch out for high-sodium ingredients. These include MSG (monosodium glutamate), baking soda, and sodium phosphate.   Eating less salt  Give yourself time to get used to eating less salt. It may take a little  while. Here are some tips to help:  · Take the saltshaker off the table. Replace it with salt-free herb mixes and spices.  · Eat fresh or plain frozen vegetables. These have much less salt than canned vegetables.  · Choose low-sodium snacks like sodium-free pretzels, crackers, or air-popped popcorn.  · Dont add salt to your food when youre cooking. Instead, season your foods with pepper, lemon, garlic, or onion.  · When you eat out, ask that your food be cooked without added salt.  · Avoid eating fried foods as these often have a great deal of salt.  If youre told to limit fluids  You may need to limit how much fluid you have to help prevent swelling. This includes anything that is liquid at room temperature, such as ice cream and soup. If your doctor tells you to limit fluid, try these tips:  · Measure drinks in a measuring cup before you drink them. This will help you meet daily goals.  · Chill drinks to make them more refreshing.  · Suck on frozen lemon wedges to quench thirst.  · Only drink when youre thirsty.  · Chew sugarless gum or suck on hard candy to keep your mouth moist.  · Weigh yourself daily to know if your body's fluid content is rising.  My sodium goal  Your healthcare provider may give you a sodium goal to meet each day. This includes sodium found in food as well as salt that you add. My goal is to eat no more than ___________ mg of sodium per day.     When to call your doctor  Call your doctor right away if you have any symptoms of worsening heart failure. These can include:  · Sudden weight gain  · Increased swelling of your legs or ankles  · Trouble breathing when youre resting or at night  · Increase in the number of pillows you have to sleep on  · Chest pain, pressure, discomfort, or pain in the jaw, neck, or back   Date Last Reviewed: 3/21/2016  © 5249-2552 [employer]. [address] [place] [address], [place] [address]. All rights reserved. This information is not intended as a  substitute for professional medical care. Always follow your healthcare professional's instructions.              Heart Failure: Medicines to Help Your Heart    You have a condition called heart failure (also known as congestive heart failure, or CHF). Your doctor will likely prescribe medicines for heart failure and any underlying health problems you have. Most heart failure patients take one or more types of medicinen. Your healthcare provider will work to find the combination of medicines that works best for you.  Heart failure medicines  Here are the most common heart failure medicines:  · ACE inhibitors lower blood pressure and decrease strain on the heart. This makes it easier for the heart to pump. Angiotensin receptor blockers have similar effects. These are prescribed for some patients instead of ACE inhibitors.  · Beta-blockers relieve stress on the heart. They also improve symptoms. They may also improve the heart's pumping action over time.  · Diuretics (also called water pills) help rid your body of excess water. This can help rid your body of swelling (edema). Having less fluid to pump means your heart doesnt have to work as hard. Some diuretics make your body lose a mineral called potassium. Your doctor will tell you if you need to take supplements or eat more foods high in potassium.  · Digoxin helps your heart pump with more strength. This helps your heart pump more blood with each beat. So, more oxygen-rich blood travels to the rest of the body.  · Aldosterone antagonists help alter hormones and decrease strain on the heart.  · Hydralazine and nitrates are two separate medicines used together to treat heart failure. They may come in one combination pill. They lower blood pressure and decrease how hard the heart has to pump.  Medicines for related conditions  Controlling other heart problems helps keep heart failure under control, too. Depending on other heart problems you have, medicines may  be prescribed to:  · Lower blood pressure (antihypertensives).  · Lower cholesterol levels (statins).  · Prevent blood clots (anticoagulants or aspirin).  · Keep the heartbeat steady (antiarrhythmics).  Date Last Reviewed: 3/5/2016  © 2753-1267 [employer]. [address] [place] [address]. All rights reserved. This information is not intended as a substitute for professional medical care. Always follow your healthcare professional's instructions.              Heart Failure: Procedures That May Help    The heart is a muscle that pumps oxygen-rich blood to all parts of the body. When you have heart failure, the heart is not able to pump as well as it should. Blood and fluid may back up into the lungs (congestive heart failure), and some parts of the body dont get enough oxygen-rich blood to work normally. These problems lead to the symptoms of heart failure.     Certain procedures may help the heart pump better in some cases of heart failure. Some procedures are done to treat health problems that may have caused the heart failure such as coronary artery disease or heart rhythm problems. For more serious heart failure, other options are available.  Treating artery and valve problems  If you have coronary artery disease or valve disease, procedures may be done to improve blood flow. This helps the heart pump better, which can improve heart failure symptoms. First, your doctor may do a cardiac catheterization to help detect clogged blood vessels or valve damage. During this procedure, a  thin tube (catheter) in inserted into a blood vessel and guided to the heart. There a dye is injected and a special type of X-ray (angiogram) is taken of the blood vessels. Procedures to open a blocked artery or fix damaged valves can also be done using catheterization.  · Angioplasty uses a balloon-tipped instrument at the end of the catheter. The balloon is inflated to widen the narrowed artery. In many cases,  a stent is expanded to further support the narrowed artery. A stent is a metal mesh tube.  · Valve surgery repairs or replacement of faulty valves can also be done during catheterization so blood can flow properly through the chambers of the heart.  Bypass surgery is another option to help treat blocked arteries. It uses a healthy blood vessel from elsewhere in the body. The healthy blood vessel is attached above and below the blocked area so that blood can flow around the blocked artery.  Treating heart rhythm problems  A device may be placed in the chest to help a weak heart maintain a healthy, heartbeat so the heart can pump more effectively:  · Pacemaker. A pacemaker is an implanted device that regulates your heartbeat electronically. It monitors your heart's rhythm and generates a painless electric impulse that helps the heart beat in a regular rhythm. A pacemaker is programmed to meet your specific heart rhythm needs.  · Biventricular pacing/cardiac resynchronization therapy. A type of pacemaker that paces both pumping chambers of the heart at the same time to coordinate contractions and to improve the heart's function. Some people with heart failure are candidates for this therapy.  · Implantable cardioverter defibrillator. A device similar to a pacemaker that senses when the heart is beating too fast and delivers an electrical shock to convert the fast rhythm to a normal rhythm. This can be a life saving device.  In severe cases  In more serious cases of heart failure when other treatments no longer work, other options may include:  · Ventricular assist devices (VADs). These are mechanical devices used to take over the pumping function for one or both of the heart's ventricles, or pumping chambers. A VAD may be necessary when heart failure progresses to the point that medicines and other treatments no longer help. In some cases, a VAD may be used as a bridge to transplant.  · Heart transplant. This is  replacing the diseased heart with a healthy one from a donor. This is an option for a few people who are very sick. A heart transplant is very serious and not an option for all patients. Your doctor can tell you more.  Date Last Reviewed: 3/20/2016  © 6571-8552 [employer]. [address] [place] [address]. All rights reserved. This information is not intended as a substitute for professional medical care. Always follow your healthcare professional's instructions.              Heart Failure: Tracking Your Weight  You have a condition called heart failure. When you have heart failure, a sudden weight gain or a steady rise in weight is a warning sign that your body is retaining too much water and salt. This could mean your heart failure is getting worse. If left untreated, it can cause problems for your lungs and result in shortness of breath. Weighing yourself each day is the best way to know if youre retaining water. If your weight goes up quickly, call your doctor. You will be given instructions on how to get rid of the excess water. You will likely need medicines and to avoid salt. This will help your heart work better.  Call your doctor if you gain more than 2 pounds in 1 day, more than 5 pounds in 1 week, or whatever weight gain you were told to report by your doctor. This is often a sign of worsening heart failure and needs to be evaluated and treated. Your doctor will tell you what to do next.   Tips for weighing yourself    · Weigh yourself at the same time each morning, wearing the same clothes. Weigh yourself after urinating and before eating.  · Use the same scale each day. Make sure the numbers are easy to read. Put the scale on a flat, hard surface -- not on a rug or carpet.  · Do not stop weighing yourself. If you forget one day, weigh again the next morning.  How to use your weight chart  · Keep your weight chart near the scale. Write your weight on the chart as soon as you get  off the scale.  · Fill in the month and the start date on the chart. Then write down your weight each day. Your chart will look like this:    · If you miss a day, leave the space blank. Weigh yourself the next day and write your weight in the next space.  · Take your weight chart with you when you go to see your doctor.  Date Last Reviewed: 3/20/2016  © 6524-2346 [employer]. [address] [place] [address], [place] [address]. All rights reserved. This information is not intended as a substitute for professional medical care. Always follow your healthcare professional's instructions.              Heart Failure: Warning Signs of a Flare-Up  You have a condition called heart failure. Once you have heart failure, flare-ups can happen. Below are signs that can mean your heart failure is getting worse. If you notice any of these warning signs, call your healthcare provider.  Swelling    · Your feet, ankles, or lower legs get puffier.  · You notice skin changes on your lower legs.  · Your shoes feel too tight.  · Your clothes are tighter in the waist.  · You have trouble getting rings on or off your fingers.  Shortness of breath  · You have to breathe harder even when youre doing your normal activities or when youre resting.  · You are short of breath walking up stairs or even short distances.  · You wake up at night short of breath or coughing.  · You need to use more pillows or sit up to sleep.  · You wake up tired or restless.  Other warning signs  · You feel weaker, dizzy, or more tired.  · You have chest pain or changes in your heartbeat.  · You have a cough that wont go away.  · You cant remember things or dont feel like eating.  Tracking your weight  Gaining weight is often the first warning sign that heart failure is getting worse. Gaining even a few pounds can be a sign that your body is retaining excess water and salt. Weighing yourself each day in the morning after you urinate and before you eat, is  the best way to know if you're retaining water. Get a scale that is easy to read and make sure you wear the same clothes and use the same scale every time you weigh. Your healthcare provider will show you how to track your weight. Call your doctor if you gain more than 2 pounds in 1 day, 5 pounds in 1 week, or whatever weight gain you were told to report by your doctor. This is often a sign of worsening heart failure and needs to be evaluated and treated before it compromises your breathing. Your doctor will tell you what to do next.    Date Last Reviewed: 3/15/2016  © 9781-7254 [employer]. [address] [place] [address], [place] [address]. All rights reserved. This information is not intended as a substitute for professional medical care. Always follow your healthcare professional's instructions.              Chronic Kindey Disease Education              [facility] complies with applicable Federal civil rights laws and does not discriminate on the basis of race, color, national origin, age, disability, or sex.

## 2017-04-24 NOTE — PROGRESS NOTES
Dr. Knutson at bedside explaining results of procedure to patient and daughter. All questions answered.

## 2017-04-24 NOTE — ANESTHESIA POSTPROCEDURE EVALUATION
"Anesthesia Post Evaluation    Patient: Misa Barajas    Procedure(s) Performed: Procedure(s) (LRB):  LITHOTRIPSY-EXTRACORPOREAL SHOCK WAVE STAGED (Right)    Final Anesthesia Type: general  Patient location during evaluation: PACU  Patient participation: Yes- Able to Participate  Level of consciousness: awake  Post-procedure vital signs: reviewed and stable  Pain management: adequate  Airway patency: patent  PONV status at discharge: No PONV  Anesthetic complications: no      Cardiovascular status: stable  Respiratory status: unassisted  Hydration status: euvolemic  Follow-up not needed.        Visit Vitals    /62    Pulse 74    Temp 36.3 °C (97.3 °F) (Temporal)    Resp 18    Ht 5' 4" (1.626 m)    Wt 81.6 kg (180 lb)    SpO2 99%    Breastfeeding No    BMI 30.9 kg/m2       Pain/Neeraj Score: Pain Assessment Performed: Yes (4/24/2017 11:15 AM)  Presence of Pain: denies (4/24/2017 11:15 AM)  Neeraj Score: 9 (4/24/2017 11:15 AM)      "

## 2017-04-24 NOTE — PLAN OF CARE
Problem: Patient Care Overview  Goal: Plan of Care Review  Outcome: Outcome(s) achieved Date Met:  04/24/17     Discharge instructions and prescription given to patient and daughter. Verbalized understanding. Patient stable, tolerating fluids. No complaints at this time.   Patient adequate for discharge once she voids. Patient and daughter aware.

## 2017-04-25 ENCOUNTER — PATIENT MESSAGE (OUTPATIENT)
Dept: INTERNAL MEDICINE | Facility: CLINIC | Age: 72
End: 2017-04-25

## 2017-04-25 VITALS
TEMPERATURE: 98 F | HEIGHT: 64 IN | BODY MASS INDEX: 30.73 KG/M2 | WEIGHT: 180 LBS | RESPIRATION RATE: 18 BRPM | OXYGEN SATURATION: 96 % | HEART RATE: 77 BPM | DIASTOLIC BLOOD PRESSURE: 61 MMHG | SYSTOLIC BLOOD PRESSURE: 128 MMHG

## 2017-04-25 DIAGNOSIS — I44.4 LEFT ANTERIOR FASCICULAR BLOCK: ICD-10-CM

## 2017-04-25 DIAGNOSIS — I45.10 RBBB: Primary | ICD-10-CM

## 2017-04-25 PROBLEM — Z01.818 PREOPERATIVE TESTING: Status: RESOLVED | Noted: 2017-04-24 | Resolved: 2017-04-25

## 2017-05-03 ENCOUNTER — OFFICE VISIT (OUTPATIENT)
Dept: NEPHROLOGY | Facility: CLINIC | Age: 72
End: 2017-05-03
Payer: MEDICARE

## 2017-05-03 VITALS
WEIGHT: 177.5 LBS | HEART RATE: 94 BPM | SYSTOLIC BLOOD PRESSURE: 140 MMHG | HEIGHT: 64 IN | DIASTOLIC BLOOD PRESSURE: 68 MMHG | BODY MASS INDEX: 30.3 KG/M2 | OXYGEN SATURATION: 92 %

## 2017-05-03 DIAGNOSIS — I10 ESSENTIAL HYPERTENSION: ICD-10-CM

## 2017-05-03 DIAGNOSIS — N25.0 RENAL OSTEODYSTROPHY: ICD-10-CM

## 2017-05-03 DIAGNOSIS — N20.0 NEPHROLITHIASIS: ICD-10-CM

## 2017-05-03 DIAGNOSIS — N18.30 CKD (CHRONIC KIDNEY DISEASE) STAGE 3, GFR 30-59 ML/MIN: Primary | ICD-10-CM

## 2017-05-03 PROCEDURE — 1160F RVW MEDS BY RX/DR IN RCRD: CPT | Mod: S$GLB,,, | Performed by: INTERNAL MEDICINE

## 2017-05-03 PROCEDURE — 3078F DIAST BP <80 MM HG: CPT | Mod: S$GLB,,, | Performed by: INTERNAL MEDICINE

## 2017-05-03 PROCEDURE — 99999 PR PBB SHADOW E&M-EST. PATIENT-LVL III: CPT | Mod: PBBFAC,,, | Performed by: INTERNAL MEDICINE

## 2017-05-03 PROCEDURE — 1159F MED LIST DOCD IN RCRD: CPT | Mod: S$GLB,,, | Performed by: INTERNAL MEDICINE

## 2017-05-03 PROCEDURE — 99214 OFFICE O/P EST MOD 30 MIN: CPT | Mod: S$GLB,,, | Performed by: INTERNAL MEDICINE

## 2017-05-03 PROCEDURE — 3077F SYST BP >= 140 MM HG: CPT | Mod: S$GLB,,, | Performed by: INTERNAL MEDICINE

## 2017-05-03 PROCEDURE — 99499 UNLISTED E&M SERVICE: CPT | Mod: S$GLB,,, | Performed by: INTERNAL MEDICINE

## 2017-05-03 PROCEDURE — 1126F AMNT PAIN NOTED NONE PRSNT: CPT | Mod: S$GLB,,, | Performed by: INTERNAL MEDICINE

## 2017-05-03 NOTE — PROGRESS NOTES
Subjective:       Patient ID: Misa Barajas is a 71 y.o. White female who presents for follow-up evaluation of No chief complaint on file.    HPI This is a 71-year-old female with a history of kidney stones and recent ureteroscopies, is coming in for f/u of her kidney function. The patient did not have any specific complaints today. She denies any frequency, hematuria, flank pain, dysuria or hematuria currently. Creatinine stable. Did not pass any stones recently.  Bp's in the 120's-130's/70-90's. Had lithotripsy last week.       PAST MEDICAL HISTORY: Significant for kidney stones with recent bilateral   ureteroscopy in June 2014, a history of breast cancer status post mastectomy and   chemo 10 years ago with Adriamycin and Taxotere and Cytoxan and hypertension.    Review of Systems   Constitutional: Positive for fatigue.   Eyes: Negative for discharge.   Respiratory: Negative for cough, shortness of breath and wheezing.    Cardiovascular: Negative for chest pain and palpitations.   Gastrointestinal: Negative for abdominal pain, diarrhea, nausea and vomiting.   Genitourinary: Negative for dysuria, frequency, hematuria and urgency.   Skin: Negative for color change and rash.   Psychiatric/Behavioral: Negative for confusion.   All other systems reviewed and are negative.      Objective:      Physical Exam   Constitutional: She is oriented to person, place, and time. She appears well-developed and well-nourished.   HENT:   Right Ear: External ear normal.   Left Ear: External ear normal.   Nose: Nose normal.   Mouth/Throat: Normal dentition.   Eyes: Conjunctivae, EOM and lids are normal. Pupils are equal, round, and reactive to light.   Neck: Trachea normal. No thyroid mass present.   Cardiovascular: Normal rate and regular rhythm.  Exam reveals no friction rub.    No murmur heard.  No lower extremity edema   Pulmonary/Chest: Effort normal and breath sounds normal. No respiratory distress. She has no decreased breath  sounds. She has no rales.   Abdominal: Soft. Bowel sounds are normal. She exhibits no mass. There is no tenderness. There is no rebound. No hernia.   Musculoskeletal: She exhibits edema.   Trace edema.   Neurological: She is alert and oriented to person, place, and time.   Skin: Skin is warm and dry. No rash noted. No erythema.   Psychiatric: She has a normal mood and affect. Judgment normal. Her mood appears not anxious. She does not exhibit a depressed mood.   Oriented to time, place and person.   Vitals reviewed.      Assessment:       No diagnosis found.    Plan:         This is a 71-year-old white female with history of kidney   stones, hypertension, is coming in for f/u of kidney function.   1. Renal. Her creatinines have been in the 1.7 to 2.1 range in 2010, likely   when she had an obstruction with a stone and had to have subsequent stone   removed and her creatinine did return to 1.4 to 1.6, which have been her   subsequent creatinines for the past three years (occ lower) with the most recent one is 1.6   with a MDRD GFR of 32mL per minute. Her CKD is likely secondary to her   advanced age and her previous obstructions with the stones. It appears that she   also has underlying hypertension that has not been treated, which could have   contributed to her kidney function as well.   2. Hypertension. Her blood pressures have been stable in the 120's-130's.  slight proteinuria from HTN likely 140mg. On losartan.   3. Renal osteodystrophy. Her calcium borderline stable, phos stable. Intact PTH  Stable.  On OTC vit D.  4. Stones. She did have slightly high oxalate on the last urorisk but volume was good. Encouraged low salt diet, hydration and low oxalate diet. She did have a last Ultrasound with stable kidney sizes with 5stones on R side  side with 9mm stones; on left one stone- side being monitored by Dr. Knutson  S/p lithotripsy last week.  Repeat urorisk.  She has f/u in urology.   We will see the patient  back again in 6 months.

## 2017-05-09 ENCOUNTER — OFFICE VISIT (OUTPATIENT)
Dept: INTERNAL MEDICINE | Facility: CLINIC | Age: 72
End: 2017-05-09
Payer: MEDICARE

## 2017-05-09 VITALS
OXYGEN SATURATION: 96 % | DIASTOLIC BLOOD PRESSURE: 76 MMHG | HEART RATE: 88 BPM | SYSTOLIC BLOOD PRESSURE: 130 MMHG | HEIGHT: 64 IN | BODY MASS INDEX: 30.6 KG/M2 | WEIGHT: 179.25 LBS

## 2017-05-09 DIAGNOSIS — I45.10 RBBB: ICD-10-CM

## 2017-05-09 DIAGNOSIS — N25.81 HYPERPARATHYROIDISM DUE TO RENAL INSUFFICIENCY: ICD-10-CM

## 2017-05-09 DIAGNOSIS — Z87.891 HISTORY OF SMOKING 30 OR MORE PACK YEARS: Primary | ICD-10-CM

## 2017-05-09 DIAGNOSIS — Z12.2 ENCOUNTER FOR SCREENING FOR LUNG CANCER: ICD-10-CM

## 2017-05-09 PROCEDURE — 99499 UNLISTED E&M SERVICE: CPT | Mod: S$GLB,,, | Performed by: INTERNAL MEDICINE

## 2017-05-09 PROCEDURE — 3075F SYST BP GE 130 - 139MM HG: CPT | Mod: S$GLB,,, | Performed by: INTERNAL MEDICINE

## 2017-05-09 PROCEDURE — 99999 PR PBB SHADOW E&M-EST. PATIENT-LVL IV: CPT | Mod: PBBFAC,,, | Performed by: INTERNAL MEDICINE

## 2017-05-09 PROCEDURE — 1159F MED LIST DOCD IN RCRD: CPT | Mod: S$GLB,,, | Performed by: INTERNAL MEDICINE

## 2017-05-09 PROCEDURE — 1160F RVW MEDS BY RX/DR IN RCRD: CPT | Mod: S$GLB,,, | Performed by: INTERNAL MEDICINE

## 2017-05-09 PROCEDURE — 99214 OFFICE O/P EST MOD 30 MIN: CPT | Mod: S$GLB,,, | Performed by: INTERNAL MEDICINE

## 2017-05-09 PROCEDURE — 3078F DIAST BP <80 MM HG: CPT | Mod: S$GLB,,, | Performed by: INTERNAL MEDICINE

## 2017-05-09 PROCEDURE — 1126F AMNT PAIN NOTED NONE PRSNT: CPT | Mod: S$GLB,,, | Performed by: INTERNAL MEDICINE

## 2017-05-09 RX ORDER — AMLODIPINE BESYLATE 5 MG/1
5 TABLET ORAL NIGHTLY
Qty: 90 TABLET | Refills: 1 | Status: SHIPPED | OUTPATIENT
Start: 2017-05-09 | End: 2017-11-22 | Stop reason: SDUPTHER

## 2017-05-09 NOTE — MR AVS SNAPSHOT
[facility]   [place]  [place] [address]  Phone: [phone]  Fax: [phone]                  Misa Barajas   2017 2:40 PM   Office Visit    Description:  Female : 1945   Provider:  Lang Cortez MD   Department:  [facility]           Reason for Visit     Follow-up           Diagnoses this Visit        Comments    History of smoking 30 or more pack years    -  Primary     RBBB         Encounter for screening for lung cancer         Hyperparathyroidism due to renal insufficiency                To Do List           Future Appointments        Provider Department Dept Phone    5/10/2017 10:30 AM [facility] CT1 LIMIT 400 LBS [facility] [phone]    2017 1:00 PM NOM [id number] 485 LB LIMIT [facility] [phone]    2017 2:15 PM Bonnie Knutson MD [facility] - Urology 4th Floor [phone]    2017 9:00 AM MD [facility] - Cardiology [phone]    7/10/2017 2:00 PM Lang Cortez MD [facility] [phone]      Goals (5 Years of Data)     None       These Medications        Disp Refills Start End    amlodipine (NORVASC) 5 MG tablet 90 tablet 1 2017    Take 1 tablet (5 mg total) by mouth nightly. - Oral    Pharmacy: [id number]  [address] [address] [address] Ph #: [phone]         [facility]     [facility] Care Line -  Assistance  Unless otherwise directed by your provider, please contact [facility], our nurse care line that is available for  assistance.     Registered nurses in the [facility] provide: appointment scheduling, clinical advisement, health education, and other advisory services.  Call: [phone] (toll free)               Medications           Message regarding Medications     Verify the changes and/or additions to your medication regime listed below are  "the same as discussed with your clinician today.  If any of these changes or additions are incorrect, please notify your healthcare provider.        CHANGE how you are taking these medications     Start Taking Instead of    amlodipine (NORVASC) 5 MG tablet amlodipine (NORVASC) 5 MG tablet    Dosage:  Take 1 tablet (5 mg total) by mouth nightly. Dosage:  Take 1 tablet (5 mg total) by mouth once daily.    Reason for Change:  Reorder            Verify that the below list of medications is an accurate representation of the medications you are currently taking.  If none reported, the list may be blank. If incorrect, please contact your healthcare provider. Carry this list with you in case of emergency.           Current Medications     acetaminophen (TYLENOL) 500 MG tablet Take 1 tablet (500 mg total) by mouth every 6 (six) hours as needed for Pain.    amlodipine (NORVASC) 5 MG tablet Take 1 tablet (5 mg total) by mouth nightly.    aspirin (ECOTRIN) 81 MG EC tablet Take 81 mg by mouth once daily.    biotin 1 mg tablet Take 1,000 mcg by mouth 3 (three) times daily.    cholecalciferol, vitamin D3, 1,000 unit capsule Take 2 capsules (2,000 Units total) by mouth once daily.    fish oil-omega-3 fatty acids 300-1,000 mg capsule Take 2 g by mouth once daily.    fluticasone (FLONASE) 50 mcg/actuation nasal spray 2 sprays once daily.  Use as directed    GARLIC ORAL Take by mouth.    losartan (COZAAR) 50 MG tablet Take 1 tablet (50 mg total) by mouth once daily.    tamsulosin (FLOMAX) 0.4 mg Cp24 Take 1 capsule (0.4 mg total) by mouth once daily.    VITAMIN B COMPLEX NO.12-NIACIN ORAL Take by mouth.           Clinical Reference Information           Your Vitals Were     BP Pulse Height Weight SpO2 BMI    130/76 (BP Location: Right arm, Patient Position: Sitting, BP Method: Manual) 88 5' 4" (1.626 m) 81.3 kg (179 lb 3.7 oz) 96% 30.77 kg/m2      Blood Pressure          Most Recent Value    BP  130/76      Allergies as of 5/9/2017  "    Adhesive      Immunizations Administered on Date of Encounter - 5/9/2017     None      Orders Placed During Today's Visit     Future Labs/Procedures Expected by Expires    CT Chest Lung Screening Low Dose  5/9/2017 5/9/2018      Instructions    Otrageous, please call us            Language Assistance Services     ATTENTION: Language assistance services are available, free of charge. Please call [phone].      ATENCIÓN: Si habla español, tiene a andrade disposición servicios gratuitos de asistencia lingüística. Llame al [phone].     CHÚ Ý: N?u b?n nói Ti?ng Vi?t, có các d?ch v? h? tr? ngôn ng? mi?n phí dành cho b?n. G?i s? [phone].         [facility] complies with applicable Federal civil rights laws and does not discriminate on the basis of race, color, national origin, age, disability, or sex.

## 2017-05-09 NOTE — PROGRESS NOTES
Portions of this note are generated with voice recognition software. Typographical errors may exist.     SUBJECTIVE:    This is a/an 71 y.o. female here for primary care visit for  Chief Complaint   Patient presents with    Follow-up     Patient states that she has not received cardiology follow-up for EKG changes.  States that when she did smoke she was a heavy smoker.  Based on today's calculations the patient has pack-year 41 history of tobacco utilization.  States she was never evaluated for COPD.  She denies a history of heart failure.  Denies orthopnea.  Denies uncharacteristic dyspnea with typical activities of daily living.      Medications Reviewed and Updated    Past medical, family, and social histories were reviewed and updated.    Answers for HPI/ROS submitted by the patient on 5/7/2017   neck pain: No, No  unexpected weight change: No  hearing loss: No  rhinorrhea: No  trouble swallowing: No  eye discharge: No  visual disturbance: No  chest tightness: No  wheezing: No  chest pain: No  palpitations: No  blood in stool: No  constipation: No  vomiting: No  diarrhea: No  polydipsia: No  polyuria: No  difficulty urinating: No  hematuria: No  menstrual problem: No  dysuria: No  joint swelling: No  arthralgias: No  headaches: No  weakness: No  confusion: No  dysphoric mood: No      Allergic:    Review of patient's allergies indicates:   Allergen Reactions    Adhesive Rash     Pt states she removed a LATEX bandaid and the skin beneath was swollen and red. No other latex causes a reaction.       OBJECTIVE:  BP: 130/76 Pulse: 88    Wt Readings from Last 3 Encounters:   05/09/17 81.3 kg (179 lb 3.7 oz)   05/03/17 80.5 kg (177 lb 7.5 oz)   04/24/17 81.6 kg (180 lb)    Body mass index is 30.77 kg/(m^2).  Previous Blood Pressure Readings :   BP Readings from Last 3 Encounters:   05/09/17 130/76   05/03/17 (!) 140/68   04/24/17 128/61       GEN: No apparent distress  HEENT: sclera non-icteric, conjunctiva  clear  CV: no peripheral edema  PULM: breathing non-labored  ABD: Obese, protuberant abdomen.  PSYCH: appropriate affect  MSK: able to rise from chair without assistance  SKIN: normal skin turgor    Pertinent Labs Reviewed       ASSESSMENT/PLAN:    History of smoking 30 or more pack years.  Warrants lung cancer screening.  -     CT Chest Lung Screening Low Dose; Future; Expected date: 5/9/17    RBBB.  Etiology unclear.  Given significant smoking history pulmonary hypertension possibility relating to COPD.  CT scan can help elucidate if this is going on  -     CT Chest Lung Screening Low Dose; Future; Expected date: 5/9/17    Encounter for screening for lung cancer  -     CT Chest Lung Screening Low Dose; Future; Expected date: 5/9/17    Hyperparathyroidism due to renal insufficiency.  Stable.  Recommend patient continue to follow with nephrology    Other orders  -     amlodipine (NORVASC) 5 MG tablet; Take 1 tablet (5 mg total) by mouth nightly.  Dispense: 90 tablet; Refill: 1          Future Appointments  Date Time Provider Department Center   5/16/2017 2:15 PM Bonnie Knutson MD [facility] UROLOGY [facility]   5/17/2017 9:00 AM Arely Mann MD [facility] CARDIO [facility]   7/10/2017 2:00 PM Lang Cortez MD [facility]       Lang Cortez  5/14/2017  8:23 PM

## 2017-05-10 ENCOUNTER — PATIENT MESSAGE (OUTPATIENT)
Dept: UROLOGY | Facility: CLINIC | Age: 72
End: 2017-05-10

## 2017-05-10 ENCOUNTER — HOSPITAL ENCOUNTER (OUTPATIENT)
Dept: RADIOLOGY | Facility: HOSPITAL | Age: 72
Discharge: HOME | End: 2017-05-10
Attending: INTERNAL MEDICINE
Payer: MEDICARE

## 2017-05-10 ENCOUNTER — TELEPHONE (OUTPATIENT)
Dept: UROLOGY | Facility: CLINIC | Age: 72
End: 2017-05-10

## 2017-05-10 ENCOUNTER — HOSPITAL ENCOUNTER (OUTPATIENT)
Dept: RADIOLOGY | Facility: HOSPITAL | Age: 72
Discharge: HOME | End: 2017-05-10
Attending: UROLOGY
Payer: MEDICARE

## 2017-05-10 DIAGNOSIS — Z87.891 HISTORY OF SMOKING 30 OR MORE PACK YEARS: ICD-10-CM

## 2017-05-10 DIAGNOSIS — N20.0 KIDNEY STONES: ICD-10-CM

## 2017-05-10 DIAGNOSIS — Z12.2 ENCOUNTER FOR SCREENING FOR LUNG CANCER: ICD-10-CM

## 2017-05-10 DIAGNOSIS — I45.10 RBBB: ICD-10-CM

## 2017-05-10 PROCEDURE — 74000 XR ABDOMEN AP 1 VIEW: CPT | Mod: TC

## 2017-05-10 PROCEDURE — 74000 XR ABDOMEN AP 1 VIEW: CPT | Mod: 26,,, | Performed by: RADIOLOGY

## 2017-05-10 PROCEDURE — 76497 UNLISTED CT PROCEDURE: CPT | Mod: TC

## 2017-05-11 ENCOUNTER — TELEPHONE (OUTPATIENT)
Dept: UROLOGY | Facility: CLINIC | Age: 72
End: 2017-05-11

## 2017-05-11 DIAGNOSIS — N20.0 KIDNEY STONES: Primary | ICD-10-CM

## 2017-05-16 ENCOUNTER — HOSPITAL ENCOUNTER (OUTPATIENT)
Dept: RADIOLOGY | Facility: HOSPITAL | Age: 72
Discharge: HOME | End: 2017-05-16
Attending: UROLOGY
Payer: MEDICARE

## 2017-05-16 ENCOUNTER — OFFICE VISIT (OUTPATIENT)
Dept: UROLOGY | Facility: CLINIC | Age: 72
End: 2017-05-16
Payer: MEDICARE

## 2017-05-16 VITALS
SYSTOLIC BLOOD PRESSURE: 105 MMHG | HEART RATE: 85 BPM | DIASTOLIC BLOOD PRESSURE: 59 MMHG | WEIGHT: 179.25 LBS | HEIGHT: 64 IN | BODY MASS INDEX: 30.6 KG/M2

## 2017-05-16 DIAGNOSIS — N18.30 CKD (CHRONIC KIDNEY DISEASE) STAGE 3, GFR 30-59 ML/MIN: ICD-10-CM

## 2017-05-16 DIAGNOSIS — N25.0 RENAL OSTEODYSTROPHY: ICD-10-CM

## 2017-05-16 DIAGNOSIS — N20.0 NEPHROLITHIASIS: ICD-10-CM

## 2017-05-16 DIAGNOSIS — Z85.3 HISTORY OF BREAST CANCER: ICD-10-CM

## 2017-05-16 DIAGNOSIS — Z01.818 PREOPERATIVE TESTING: ICD-10-CM

## 2017-05-16 DIAGNOSIS — E55.9 VITAMIN D DEFICIENCY: Primary | ICD-10-CM

## 2017-05-16 DIAGNOSIS — N28.89 RIGHT RENAL MASS: ICD-10-CM

## 2017-05-16 LAB
BILIRUB SERPL-MCNC: NORMAL MG/DL
BLOOD URINE, POC: NORMAL
COLOR, POC UA: NORMAL
GLUCOSE UR QL STRIP: NORMAL
KETONES UR QL STRIP: NORMAL
LEUKOCYTE ESTERASE URINE, POC: NORMAL
NITRITE, POC UA: NORMAL
PH, POC UA: 5
PROTEIN, POC: NORMAL
SPECIFIC GRAVITY, POC UA: 1.01
UROBILINOGEN, POC UA: NORMAL

## 2017-05-16 PROCEDURE — 99024 POSTOP FOLLOW-UP VISIT: CPT | Mod: S$GLB,,, | Performed by: UROLOGY

## 2017-05-16 PROCEDURE — 81001 URINALYSIS AUTO W/SCOPE: CPT | Mod: S$GLB,,, | Performed by: UROLOGY

## 2017-05-16 PROCEDURE — 74000 XR ABDOMEN AP 1 VIEW: CPT | Mod: 26,,, | Performed by: RADIOLOGY

## 2017-05-16 PROCEDURE — 82370 X-RAY ASSAY CALCULUS: CPT

## 2017-05-16 PROCEDURE — 99499 UNLISTED E&M SERVICE: CPT | Mod: S$GLB,,, | Performed by: UROLOGY

## 2017-05-16 PROCEDURE — 99999 PR PBB SHADOW E&M-EST. PATIENT-LVL III: CPT | Mod: PBBFAC,,, | Performed by: UROLOGY

## 2017-05-16 NOTE — PROGRESS NOTES
Subjective:       Patient ID: Misa Barajas is a 71 y.o. female.    Chief Complaint: follow up ESWL    HPI Comments: Misa Barajas is a 71 y.o. Female here for follow up of kidney stones.  She is s/p a right ESWL on 4/4/17.   KUB personally reviewed. No stones in ureter. Likely stone in right lower pole.  CT done 4/17 showed a stone in right lower pole, but only showed portion of kidney and possible right renal mass.   Some right sided pain.  Passed a bunch of fragments.       No urinary frequency or urgency. Gross hematuria x 1 recently. Painless. No incontinence.  Nocturia x 1.  Patient followed by Dr. Ross    6/14 bilateral ureteroscopy, then another 6/14 right ureteroscopy to clear right stone burden.  Repeat 24 hour urine showed high oxalate and sodium.  She has limited her diet in sodium.    Last 24 hour urine showed borderline elevated oxalate.   Stones were calcium oxalate monohydrate, althought HF were 500-600.    Had a biopsy of right ureteral area where stones were impacted. Biopsy was negative.     Former smoker. Quit 5 years ago.  1 pack per day.    KUB 2014 showed 5mm stone in left lower pole, stable.   KUB 10/15 shows 2 stones on left and multiple stones on right, largest is 7mm and 5mm.   KUB 1/16 and 7/16 showed multiple stones on right. Stable. No increase.     Films personally reviewed.         Past Surgical History:   Procedure Laterality Date    BREAST SURGERY Left 2003    Mastectomy    LITHOTRIPSY      MASTECTOMY      left-& lymph node dissection    ureteroscopy Bilateral     6.14       Past Medical History:   Diagnosis Date    Arthritis     knee joint pain    Breast cancer     left breast & lymph nodes-s/p sx with chemo    Bronchitis     with flu-2/2014    Cataracts, bilateral     History of chemotherapy     last treatment 12/2002 (had 8 treatments)    Hyperlipidemia 11/20/2016    Hypertension     Renal calculi     hematuria       Social History     Social History     Marital status:      Spouse name: N/A    Number of children: N/A    Years of education: N/A     Occupational History    Not on file.     Social History Main Topics    Smoking status: Former Smoker     Packs/day: 1.00     Years: 50.00     Types: Cigarettes     Quit date: 5/20/2009    Smokeless tobacco: Never Used    Alcohol use 0.6 oz/week     1 Glasses of wine per week      Comment: rarely    Drug use: No    Sexual activity: Not Currently     Partners: Male     Other Topics Concern    Not on file     Social History Narrative       Family History   Problem Relation Age of Onset    Cancer Mother      breast cancer    Bone cancer Mother     Breast cancer Mother     Arthritis Mother     Cancer Sister      breast cancer    Breast cancer Sister     Cancer Father      LUNG    No Known Problems Brother     Fibroids Daughter     No Known Problems Daughter     Anesthesia problems Neg Hx     Glaucoma Neg Hx        Current Outpatient Prescriptions   Medication Sig Dispense Refill    acetaminophen (TYLENOL) 500 MG tablet Take 1 tablet (500 mg total) by mouth every 6 (six) hours as needed for Pain.  0    amlodipine (NORVASC) 5 MG tablet Take 1 tablet (5 mg total) by mouth nightly. 90 tablet 1    aspirin (ECOTRIN) 81 MG EC tablet Take 81 mg by mouth once daily.      biotin 1 mg tablet Take 1,000 mcg by mouth 3 (three) times daily.      cholecalciferol, vitamin D3, 1,000 unit capsule Take 2 capsules (2,000 Units total) by mouth once daily.  0    fish oil-omega-3 fatty acids 300-1,000 mg capsule Take 2 g by mouth once daily.      fluticasone (FLONASE) 50 mcg/actuation nasal spray 2 sprays once daily.  Use as directed 16 g 12    GARLIC ORAL Take by mouth.      losartan (COZAAR) 50 MG tablet Take 1 tablet (50 mg total) by mouth once daily. 90 tablet 3    tamsulosin (FLOMAX) 0.4 mg Cp24 Take 1 capsule (0.4 mg total) by mouth once daily. 30 capsule 0    VITAMIN B COMPLEX NO.12-NIACIN ORAL Take by  mouth.       No current facility-administered medications for this visit.        Review of patient's allergies indicates:   Allergen Reactions    Adhesive Rash     Pt states she removed a LATEX bandaid and the skin beneath was swollen and red. No other latex causes a reaction.       Review of Systems   Constitutional: Negative for chills, fever and unexpected weight change.   HENT: Negative for nosebleeds.    Eyes: Negative for visual disturbance.   Respiratory: Negative for chest tightness.    Cardiovascular: Negative for chest pain.   Gastrointestinal: Negative for diarrhea.   Genitourinary: Negative for difficulty urinating.   Musculoskeletal: Negative for myalgias.   Skin: Negative for rash.   Neurological: Negative for seizures.   Hematological: Does not bruise/bleed easily.   Psychiatric/Behavioral: Negative for behavioral problems.       Objective:      Physical Exam   Constitutional: She is oriented to person, place, and time. She appears well-developed and well-nourished.   HENT:   Head: Normocephalic and atraumatic.   Cardiovascular: Normal rate.    Pulmonary/Chest: Effort normal.   Musculoskeletal: She exhibits no deformity.   Neurological: She is alert and oriented to person, place, and time.   Skin: Skin is warm.       urine dip clear  Assessment:       1. Vitamin D deficiency    2. Renal osteodystrophy    3. Nephrolithiasis    4. History of breast cancer    5. CKD (chronic kidney disease) stage 3, GFR 30-59 ml/min        Plan:     continue follow up with Dr. Ross to help medically manage stones.   CT RSS to evaluate total stone burden and compare to KUB. No stones in ureter on recent KUB.  All films personally reviewed.   MRI to evaluate right renal mass. No contrast as has CKD stage 3.

## 2017-05-17 ENCOUNTER — OFFICE VISIT (OUTPATIENT)
Dept: CARDIOLOGY | Facility: CLINIC | Age: 72
End: 2017-05-17
Payer: MEDICARE

## 2017-05-17 VITALS
HEART RATE: 86 BPM | BODY MASS INDEX: 30.35 KG/M2 | OXYGEN SATURATION: 95 % | WEIGHT: 177.81 LBS | SYSTOLIC BLOOD PRESSURE: 111 MMHG | DIASTOLIC BLOOD PRESSURE: 67 MMHG | HEIGHT: 64 IN

## 2017-05-17 DIAGNOSIS — R94.31 ABNORMAL ECG: ICD-10-CM

## 2017-05-17 DIAGNOSIS — I51.89 LEFT VENTRICULAR DIASTOLIC DYSFUNCTION WITH PRESERVED SYSTOLIC FUNCTION: ICD-10-CM

## 2017-05-17 DIAGNOSIS — I45.10 RBBB: ICD-10-CM

## 2017-05-17 DIAGNOSIS — I10 HTN (HYPERTENSION), BENIGN: Primary | ICD-10-CM

## 2017-05-17 DIAGNOSIS — E78.1 HYPERTRIGLYCERIDEMIA: ICD-10-CM

## 2017-05-17 PROCEDURE — 3078F DIAST BP <80 MM HG: CPT | Mod: S$GLB,,, | Performed by: INTERNAL MEDICINE

## 2017-05-17 PROCEDURE — 3074F SYST BP LT 130 MM HG: CPT | Mod: S$GLB,,, | Performed by: INTERNAL MEDICINE

## 2017-05-17 PROCEDURE — 99204 OFFICE O/P NEW MOD 45 MIN: CPT | Mod: S$GLB,,, | Performed by: INTERNAL MEDICINE

## 2017-05-17 PROCEDURE — 1126F AMNT PAIN NOTED NONE PRSNT: CPT | Mod: S$GLB,,, | Performed by: INTERNAL MEDICINE

## 2017-05-17 PROCEDURE — 1160F RVW MEDS BY RX/DR IN RCRD: CPT | Mod: S$GLB,,, | Performed by: INTERNAL MEDICINE

## 2017-05-17 PROCEDURE — 99999 PR PBB SHADOW E&M-EST. PATIENT-LVL III: CPT | Mod: PBBFAC,,, | Performed by: INTERNAL MEDICINE

## 2017-05-17 PROCEDURE — 99499 UNLISTED E&M SERVICE: CPT | Mod: S$GLB,,, | Performed by: INTERNAL MEDICINE

## 2017-05-17 PROCEDURE — 1159F MED LIST DOCD IN RCRD: CPT | Mod: S$GLB,,, | Performed by: INTERNAL MEDICINE

## 2017-05-17 NOTE — LETTER
May 17, 2017      Lang Cortez MD  [address] [place] [address]           [facility]  [address], [address]  [place] [address]  Phone: [phone]          Patient: Misa Barajas   MR Number: [id number]   YOB: 1945   Date of Visit: 5/17/2017       Dear Dr. Lang Cortez:    Thank you for referring Misa Barajas to me for evaluation. Attached you will find relevant portions of my assessment and plan of care.    If you have questions, please do not hesitate to call me. I look forward to following Misa Barajas along with you.    Sincerely,    Arely Mann MD    Enclosure  CC:  No Recipients    If you would like to receive this communication electronically, please contact [email or web address] or [phone] to request more information on [facility] access.    For providers and/or their staff who would like to refer a patient to [facility], please contact us through our one-stop-shop provider referral line, [facility], at [phone].    If you feel you have received this communication in error or would no longer like to receive these types of communications, please e-mail [email or web address]

## 2017-05-17 NOTE — PROGRESS NOTES
Subjective:   Patient ID:  Misa Barajas is a 71 y.o. female who presents for evaluation of Consult      HPI: 72 y/o female with PMH of HTN and HLD present to establish care secondary to abnormal ECG showing possible bi-fasicular block with first degree AV block. She denies any symptoms. No chest pain or dyspnea. No palpitations or syncope. She sometimes get light headed. She sometimes get REED with moderate activities. Unable to say if she can walk 2 blocks. No history of MI or CVA. No family history of SCD.     She quit smoking in 2009 and is not diabetic.     Last ECG before this resent one was in 2002 that showed NSR with incomplete RBBB. Since then WI interval and QRS interval have increased. No on any medications to cause changes. She is not on any AV blockers.    Past Medical History:   Diagnosis Date    Arthritis     knee joint pain    Breast cancer     left breast & lymph nodes-s/p sx with chemo    Bronchitis     with flu-2/2014    Cataracts, bilateral     History of chemotherapy     last treatment 12/2002 (had 8 treatments)    Hyperlipidemia 11/20/2016    Hypertension     Renal calculi     hematuria       Past Surgical History:   Procedure Laterality Date    BREAST SURGERY Left 2003    Mastectomy    LITHOTRIPSY      MASTECTOMY      left-& lymph node dissection    ureteroscopy Bilateral     6.14       Social History   Substance Use Topics    Smoking status: Former Smoker     Packs/day: 1.00     Years: 50.00     Types: Cigarettes     Quit date: 5/20/2009    Smokeless tobacco: Never Used    Alcohol use 0.6 oz/week     1 Glasses of wine per week      Comment: rarely       Family History   Problem Relation Age of Onset    Cancer Mother      breast cancer    Bone cancer Mother     Breast cancer Mother     Arthritis Mother     Cancer Sister      breast cancer    Breast cancer Sister     Cancer Father      LUNG    No Known Problems Brother     Fibroids Daughter     No Known Problems  Daughter     Anesthesia problems Neg Hx     Glaucoma Neg Hx        Patient's Medications   New Prescriptions    No medications on file   Previous Medications    ACETAMINOPHEN (TYLENOL) 500 MG TABLET    Take 1 tablet (500 mg total) by mouth every 6 (six) hours as needed for Pain.    AMLODIPINE (NORVASC) 5 MG TABLET    Take 1 tablet (5 mg total) by mouth nightly.    ASPIRIN (ECOTRIN) 81 MG EC TABLET    Take 81 mg by mouth once daily.    BIOTIN 1 MG TABLET    Take 1,000 mcg by mouth 3 (three) times daily.    CHOLECALCIFEROL, VITAMIN D3, 1,000 UNIT CAPSULE    Take 2 capsules (2,000 Units total) by mouth once daily.    FISH OIL-OMEGA-3 FATTY ACIDS 300-1,000 MG CAPSULE    Take 2 g by mouth once daily.    FLUTICASONE (FLONASE) 50 MCG/ACTUATION NASAL SPRAY    2 sprays once daily.  Use as directed    GARLIC ORAL    Take by mouth.    LOSARTAN (COZAAR) 50 MG TABLET    Take 1 tablet (50 mg total) by mouth once daily.    TAMSULOSIN (FLOMAX) 0.4 MG CP24    Take 1 capsule (0.4 mg total) by mouth once daily.    VITAMIN B COMPLEX NO.12-NIACIN ORAL    Take by mouth.   Modified Medications    No medications on file   Discontinued Medications    No medications on file        Review of patient's allergies indicates:   Allergen Reactions    Adhesive Rash     Pt states she removed a LATEX bandaid and the skin beneath was swollen and red. No other latex causes a reaction.       Review of Systems   Constitution: Negative.   HENT: Negative.    Eyes: Negative.    Cardiovascular: Negative.    Respiratory: Negative.    Endocrine: Negative.    Hematologic/Lymphatic: Negative.    Skin: Negative.    Musculoskeletal: Negative.    Gastrointestinal: Negative.    Neurological: Negative.    Psychiatric/Behavioral: Negative.      Objective:   Physical Exam   Constitutional: She is oriented to person, place, and time. She appears well-developed and well-nourished. No distress.   Examination of the digits showed no clubbing or cyanosis   HENT:   Head:  Normocephalic and atraumatic.   Eyes: Conjunctivae are normal. Pupils are equal, round, and reactive to light. Right eye exhibits no discharge.   Neck: Normal range of motion. Neck supple. No JVD present. No thyromegaly present.   No carotid bruits   Cardiovascular: Normal rate, regular rhythm, S1 normal, S2 normal, normal heart sounds, intact distal pulses and normal pulses.  PMI is not displaced.  Exam reveals no gallop, no friction rub and no opening snap.    No murmur heard.  Pulmonary/Chest: Effort normal and breath sounds normal. No respiratory distress. She has no wheezes. She has no rales. She exhibits no tenderness.   Abdominal: Soft. Bowel sounds are normal. She exhibits no distension and no mass. There is no tenderness. There is no guarding.   No hepatosplenomegaly   Musculoskeletal: Normal range of motion. She exhibits no edema or tenderness.   Lymphadenopathy:     She has no cervical adenopathy.   Neurological: She is alert and oriented to person, place, and time.   Skin: Skin is warm. No rash noted. She is not diaphoretic. No erythema.   Psychiatric: She has a normal mood and affect.   Nursing note and vitals reviewed.      Lab Results   Component Value Date    WBC 7.10 06/23/2016    HGB 14.4 06/23/2016    HGB 14.4 06/23/2016    HCT 43.3 06/23/2016    HCT 43.3 06/23/2016    MCV 91 06/23/2016     06/23/2016         Chemistry        Component Value Date/Time     04/13/2017 0819    K 4.5 04/13/2017 0819     04/13/2017 0819    CO2 27 04/13/2017 0819    BUN 27 (H) 04/13/2017 0819    CREATININE 1.6 (H) 04/13/2017 0819    GLU 98 04/13/2017 0819        Component Value Date/Time    CALCIUM 9.9 04/13/2017 0819    ALKPHOS 83 04/13/2017 0819    AST 23 04/13/2017 0819    ALT 19 04/13/2017 0819    BILITOT 0.6 04/13/2017 0819            Lab Results   Component Value Date    CHOL 196 08/08/2016    CHOL 187 05/24/2011    CHOL 200 (H) 10/21/2010     Lab Results   Component Value Date    HDL 47  08/08/2016    HDL 42 05/24/2011    HDL 49 10/21/2010     Lab Results   Component Value Date    LDLCALC 97.2 08/08/2016    LDLCALC 100.6 05/24/2011    LDLCALC 120.6 10/21/2010     Lab Results   Component Value Date    TRIG 259 (H) 08/08/2016    TRIG 222 (H) 05/24/2011    TRIG 152 (H) 10/21/2010     Lab Results   Component Value Date    CHOLHDL 24.0 08/08/2016    CHOLHDL 22.5 05/24/2011    CHOLHDL 24.5 10/21/2010       Lab Results   Component Value Date    TSH 2.49 08/09/2010       No results found for: HGBA1C    ECGs reviewed- NSR with 1st degree AV block, RBBB and ?LAFB  LABS reviewed  Imaging including Echoes reviewed- 2009 normal ef with DD    Assessment:     1. HTN (hypertension), benign    2. Abnormal ECG    3. RBBB    4. Left ventricular diastolic dysfunction with preserved systolic function        Plan:   Patient is cleared to have Lithotripsy-Extracorporeal Shock Wave with Dr VALERIANO Knutson. She should stop asa 5-7 days before surgery.  2d echo complete  Holter 48 hrs to check for pauses and Avr HR.  No ischemic symptoms or signs. T wave changes on ECG likely form RBBB and LVH  Continue current medications    F/u in 3 months    Clinic time spent with this patient is 40 minutes.

## 2017-05-17 NOTE — MR AVS SNAPSHOT
[facility]  [address], [address]  [place] [facility] [address]  Phone: [phone]                  Misa Barajas   2017 9:00 AM   Office Visit    Description:  Female : 1945   Provider:  Arely Mann MD   Department:  [facility]           Reason for Visit     Consult           Diagnoses this Visit        Comments    HTN (hypertension), benign    -  Primary     Abnormal ECG         RBBB         Left ventricular diastolic dysfunction with preserved systolic function         Hypertriglyceridemia                To Do List           Future Appointments        Provider Department Dept Phone    2017 12:20 PM [facility] CT6 MOBILE LIMIT [address] [facility] [phone]    2017 1:30 PM [facility] MRI WIDE BORE [facility] [phone]    7/10/2017 2:00 PM Lang oCrtez MD [facility] [phone]    2017 10:00 AM Arely Mann MD [facility] [phone]      Your Future Surgeries/Procedures     May 22, 2017   Surgery with Bonnie Knutson MD   [facility] ([facility])    [address] [address] [phone]              Goals (5 Years of Data)     None      Follow-Up and Disposition     Return in about 3 months (around 2017).    Follow-up and Disposition History      [facility]     [facility] Nurse Care Line -  Assistance  Unless otherwise directed by your provider, please contact [facility], our nurse care line that is available for  assistance.     Registered nurses in the [facility] provide: appointment scheduling, clinical advisement, health education, and other advisory services.  Call: [phone] (toll free)               Medications           Message regarding Medications     Verify the changes and/or additions to your medication regime listed below are the same as discussed  "with your clinician today.  If any of these changes or additions are incorrect, please notify your healthcare provider.             Verify that the below list of medications is an accurate representation of the medications you are currently taking.  If none reported, the list may be blank. If incorrect, please contact your healthcare provider. Carry this list with you in case of emergency.           Current Medications     acetaminophen (TYLENOL) 500 MG tablet Take 1 tablet (500 mg total) by mouth every 6 (six) hours as needed for Pain.    amlodipine (NORVASC) 5 MG tablet Take 1 tablet (5 mg total) by mouth nightly.    aspirin (ECOTRIN) 81 MG EC tablet Take 81 mg by mouth once daily.    biotin 1 mg tablet Take 1,000 mcg by mouth 3 (three) times daily.    cholecalciferol, vitamin D3, 1,000 unit capsule Take 2 capsules (2,000 Units total) by mouth once daily.    fish oil-omega-3 fatty acids 300-1,000 mg capsule Take 2 g by mouth once daily.    fluticasone (FLONASE) 50 mcg/actuation nasal spray 2 sprays once daily.  Use as directed    GARLIC ORAL Take by mouth.    losartan (COZAAR) 50 MG tablet Take 1 tablet (50 mg total) by mouth once daily.    tamsulosin (FLOMAX) 0.4 mg Cp24 Take 1 capsule (0.4 mg total) by mouth once daily.    VITAMIN B COMPLEX NO.12-NIACIN ORAL Take by mouth.           Clinical Reference Information           Your Vitals Were     BP Pulse Height Weight SpO2 BMI    111/67 86 5' 4" (1.626 m) 80.6 kg (177 lb 12.8 oz) 95% 30.52 kg/m2      Blood Pressure          Most Recent Value    BP  111/67      Allergies as of 5/17/2017     Adhesive      Immunizations Administered on Date of Encounter - 5/17/2017     None      Orders Placed During Today's Visit     Future Labs/Procedures Expected by Expires    2D Echo w/ Color Flow Doppler  As directed 5/17/2018    Holter monitor - 48 hour  As directed 5/17/2018      Language Assistance Services     ATTENTION: Language assistance services are available, free of " charge. Please call [phone].      ATENCIÓN: Si habla español, tiene a andrade disposición servicios gratuitos de asistencia lingüística. Llame al [phone].     CHÚ Ý: N?u b?n nói Ti?ng Vi?t, có các d?ch v? h? tr? ngôn ng? mi?n phí dành cho b?n. G?i s? [phone].         [facility] complies with applicable Federal civil rights laws and does not discriminate on the basis of race, color, national origin, age, disability, or sex.

## 2017-05-24 ENCOUNTER — PATIENT MESSAGE (OUTPATIENT)
Dept: UROLOGY | Facility: CLINIC | Age: 72
End: 2017-05-24

## 2017-05-24 LAB — URINARY STONE ANALYSIS: NORMAL

## 2017-05-25 ENCOUNTER — HOSPITAL ENCOUNTER (OUTPATIENT)
Dept: CARDIOLOGY | Facility: HOSPITAL | Age: 72
Discharge: HOME | End: 2017-05-25
Attending: INTERNAL MEDICINE
Payer: MEDICARE

## 2017-05-25 ENCOUNTER — TELEPHONE (OUTPATIENT)
Dept: CARDIOLOGY | Facility: CLINIC | Age: 72
End: 2017-05-25

## 2017-05-25 DIAGNOSIS — I45.10 RBBB: ICD-10-CM

## 2017-05-25 DIAGNOSIS — R94.31 ABNORMAL ECG: ICD-10-CM

## 2017-05-25 DIAGNOSIS — I51.89 LEFT VENTRICULAR DIASTOLIC DYSFUNCTION WITH PRESERVED SYSTOLIC FUNCTION: ICD-10-CM

## 2017-05-25 DIAGNOSIS — I10 HTN (HYPERTENSION), BENIGN: ICD-10-CM

## 2017-05-25 LAB
DIASTOLIC DYSFUNCTION: YES
ESTIMATED PA SYSTOLIC PRESSURE: 26.04
MITRAL VALVE MOBILITY: NORMAL
RETIRED EF AND QEF - SEE NOTES: 55 (ref 55–65)
TRICUSPID VALVE REGURGITATION: ABNORMAL

## 2017-05-25 PROCEDURE — 93306 TTE W/DOPPLER COMPLETE: CPT | Mod: 26,,, | Performed by: INTERNAL MEDICINE

## 2017-05-25 PROCEDURE — 93306 TTE W/DOPPLER COMPLETE: CPT

## 2017-05-25 PROCEDURE — 93225 XTRNL ECG REC<48 HRS REC: CPT

## 2017-05-25 NOTE — TELEPHONE ENCOUNTER
----- Message from Gay Ceballos sent at 5/25/2017 11:06 AM CDT -----  Contact: [phone]/self  Patient called in returning your call. Please advise

## 2017-05-25 NOTE — TELEPHONE ENCOUNTER
Message left informing patient that her echo is normal except for slow relaxation which is secondary to aging, weight or blood pressure and doctor will monitor this. No concerning findings.

## 2017-05-30 ENCOUNTER — HOSPITAL ENCOUNTER (OUTPATIENT)
Dept: RADIOLOGY | Facility: HOSPITAL | Age: 72
Discharge: HOME | End: 2017-05-30
Attending: UROLOGY
Payer: MEDICARE

## 2017-05-30 ENCOUNTER — TELEPHONE (OUTPATIENT)
Dept: CARDIOLOGY | Facility: CLINIC | Age: 72
End: 2017-05-30

## 2017-05-30 DIAGNOSIS — N20.0 NEPHROLITHIASIS: ICD-10-CM

## 2017-05-30 DIAGNOSIS — N28.89 RIGHT RENAL MASS: ICD-10-CM

## 2017-05-30 PROCEDURE — 93227 XTRNL ECG REC<48 HR R&I: CPT | Mod: ,,, | Performed by: INTERNAL MEDICINE

## 2017-05-30 PROCEDURE — 74181 MRI ABDOMEN W/O CONTRAST: CPT | Mod: TC

## 2017-05-30 PROCEDURE — 74176 CT ABD & PELVIS W/O CONTRAST: CPT | Mod: TC

## 2017-05-30 PROCEDURE — 74176 CT ABD & PELVIS W/O CONTRAST: CPT | Mod: 26,,, | Performed by: RADIOLOGY

## 2017-05-30 PROCEDURE — 74181 MRI ABDOMEN W/O CONTRAST: CPT | Mod: 26,,, | Performed by: RADIOLOGY

## 2017-05-30 NOTE — TREATMENT PLAN
Holter showed no pauses and average HR in 80s. There was 1 6 beat run on NSVT. Will get stress test on f/u.

## 2017-06-05 ENCOUNTER — TELEPHONE (OUTPATIENT)
Dept: HEMATOLOGY/ONCOLOGY | Facility: CLINIC | Age: 72
End: 2017-06-05

## 2017-06-05 DIAGNOSIS — Z85.3 HX: BREAST CANCER: Primary | ICD-10-CM

## 2017-06-05 NOTE — TELEPHONE ENCOUNTER
Returned call to pt.   Scheduled recall as requested by pt.   Pt verbalized understanding.   Appt reminder mailed.       ----- Message from Tenisha Garcia sent at 6/5/2017  3:34 PM CDT -----  Contact: Self  Good afternoon,    Pt is requesting orders for Mammogram. Pt also needs to schedule appt from recall letter.    Pt can be reached at [phone].    Thank you!

## 2017-06-06 ENCOUNTER — TELEPHONE (OUTPATIENT)
Dept: NEPHROLOGY | Facility: CLINIC | Age: 72
End: 2017-06-06

## 2017-06-06 NOTE — TELEPHONE ENCOUNTER
24 hr urine for stones showed low volume-please ask her to  hydrate more.  It also showed high oxalate-please ask her to follow low oxalate diet.

## 2017-06-09 ENCOUNTER — TELEPHONE (OUTPATIENT)
Dept: UROLOGY | Facility: CLINIC | Age: 72
End: 2017-06-09

## 2017-06-09 DIAGNOSIS — S37.091A: Primary | ICD-10-CM

## 2017-06-09 NOTE — TELEPHONE ENCOUNTER
CT and MRI reviewed.   Has a perinephric hematoma.  Patient reassured this should resolve.   Also, looks like cysts in kidneys.     Will plan on follow up with Ct scan in 3 months and ep visit with me.

## 2017-07-10 ENCOUNTER — LAB VISIT (OUTPATIENT)
Dept: LAB | Facility: HOSPITAL | Age: 72
End: 2017-07-10
Attending: INTERNAL MEDICINE
Payer: MEDICARE

## 2017-07-10 ENCOUNTER — OFFICE VISIT (OUTPATIENT)
Dept: INTERNAL MEDICINE | Facility: CLINIC | Age: 72
End: 2017-07-10
Payer: MEDICARE

## 2017-07-10 VITALS
OXYGEN SATURATION: 97 % | WEIGHT: 171.5 LBS | HEART RATE: 86 BPM | BODY MASS INDEX: 29.28 KG/M2 | SYSTOLIC BLOOD PRESSURE: 132 MMHG | HEIGHT: 64 IN | DIASTOLIC BLOOD PRESSURE: 70 MMHG

## 2017-07-10 DIAGNOSIS — M85.80 OSTEOPENIA, UNSPECIFIED LOCATION: Primary | ICD-10-CM

## 2017-07-10 DIAGNOSIS — M85.88 OSTEOPENIA OF SPINE: ICD-10-CM

## 2017-07-10 DIAGNOSIS — E55.9 VITAMIN D DEFICIENCY: ICD-10-CM

## 2017-07-10 DIAGNOSIS — M85.80 OSTEOPENIA, UNSPECIFIED LOCATION: ICD-10-CM

## 2017-07-10 LAB
25(OH)D3+25(OH)D2 SERPL-MCNC: 47 NG/ML
ALBUMIN SERPL BCP-MCNC: 3.6 G/DL
ANION GAP SERPL CALC-SCNC: 9 MMOL/L
BUN SERPL-MCNC: 32 MG/DL
CALCIUM SERPL-MCNC: 10.1 MG/DL
CHLORIDE SERPL-SCNC: 109 MMOL/L
CO2 SERPL-SCNC: 25 MMOL/L
CREAT SERPL-MCNC: 1.7 MG/DL
EST. GFR  (AFRICAN AMERICAN): 34 ML/MIN/1.73 M^2
EST. GFR  (NON AFRICAN AMERICAN): 29.5 ML/MIN/1.73 M^2
GLUCOSE SERPL-MCNC: 97 MG/DL
PHOSPHATE SERPL-MCNC: 3.2 MG/DL
POTASSIUM SERPL-SCNC: 4.1 MMOL/L
SODIUM SERPL-SCNC: 143 MMOL/L

## 2017-07-10 PROCEDURE — 80069 RENAL FUNCTION PANEL: CPT

## 2017-07-10 PROCEDURE — 82306 VITAMIN D 25 HYDROXY: CPT

## 2017-07-10 PROCEDURE — 99213 OFFICE O/P EST LOW 20 MIN: CPT | Mod: S$GLB,,, | Performed by: INTERNAL MEDICINE

## 2017-07-10 PROCEDURE — 1126F AMNT PAIN NOTED NONE PRSNT: CPT | Mod: S$GLB,,, | Performed by: INTERNAL MEDICINE

## 2017-07-10 PROCEDURE — 1159F MED LIST DOCD IN RCRD: CPT | Mod: S$GLB,,, | Performed by: INTERNAL MEDICINE

## 2017-07-10 PROCEDURE — 36415 COLL VENOUS BLD VENIPUNCTURE: CPT | Mod: PO

## 2017-07-10 PROCEDURE — 99999 PR PBB SHADOW E&M-EST. PATIENT-LVL III: CPT | Mod: PBBFAC,,, | Performed by: INTERNAL MEDICINE

## 2017-07-10 NOTE — PROGRESS NOTES
Portions of this note are generated with voice recognition software. Typographical errors may exist.     SUBJECTIVE:    This is a/an 71 y.o. female here for primary care visit for  Chief Complaint   Patient presents with    Other     2 month follow up     Patient states that she has not been on bisphosphonate therapy in the past.  States that she is complying with vitamin D supplementation.  States that she does not have any major dental work planned.  Reaffirms that she has not had bone fracture.      Medications Reviewed and Updated    Past medical, family, and social histories were reviewed and updated.    Review of Systems negative unless otherwise noted in history of present illness-   General ROS: negative  Psychological ROS: negative  ENT ROS: negative  Allergy and Immunology ROS: negative  Cardiovascular ROS: negative  Gastrointestinal ROS: negative  Genito-Urinary ROS: negative  Musculoskeletal ROS: negative      Allergic:    Review of patient's allergies indicates:   Allergen Reactions    Adhesive Rash     Pt states she removed a LATEX bandaid and the skin beneath was swollen and red. No other latex causes a reaction.       OBJECTIVE:  BP: 132/70 Pulse: 86    Wt Readings from Last 3 Encounters:   07/10/17 77.8 kg (171 lb 8.3 oz)   05/17/17 80.6 kg (177 lb 12.8 oz)   05/16/17 81.3 kg (179 lb 3.7 oz)    Body mass index is 29.44 kg/m².  Previous Blood Pressure Readings :   BP Readings from Last 3 Encounters:   07/10/17 132/70   05/17/17 111/67   05/16/17 (!) 105/59       GEN: No apparent distress  HEENT: sclera non-icteric, conjunctiva clear  CV: no peripheral edema  PULM: breathing non-labored  ABD: Obese, protuberant abdomen.  PSYCH: appropriate affect  MSK: able to rise from chair without assistance  SKIN: normal skin turgor    Pertinent Labs Reviewed       ASSESSMENT/PLAN:    Osteopenia, unspecified location.Condition not optimally controlled. Detailed counseling on self care measures. Plan to monitor  clinically in addition to plan below.   -     Renal function panel; Future; Expected date: 07/10/2017    Vitamin D deficiency.Condition stable.  Counseling on self-care measures. Plan to monitor clinically. Continue current medical plan.   -     Vitamin D; Future; Expected date: 07/10/2017    -     denosumab (PROLIA) injection 60 mg; Inject 1 mL (60 mg total) into the skin one time.          Future Appointments  Date Time Provider Department Center   7/14/2017 1:00 PM SHIRLEY DURANT 7 [facility] [place]   7/20/2017 9:15 AM [facility] MAMMO3 DX [facility] MAMMO [address]   7/20/2017 10:30 AM LAB, HEMONC CANCER BLDG [facility] LAB HO [facility]   7/20/2017 11:30 AM Carleen Jordan MD [facility] HEM ONC [address]   8/17/2017 1:20 PM Arely Mann MD [facility] CARDIO [facility]   9/11/2017 8:00 AM Lang Cortez MD [facility]       Lang Cortez  7/10/2017  6:18 PM

## 2017-07-10 NOTE — PATIENT INSTRUCTIONS
Zoledronic Acid injection (Hypercalcemia, Oncology)  What is this medicine?  ZOLEDRONIC ACID (LUCA le dron ik AS id) lowers the amount of calcium loss from bone. It is used to treat too much calcium in your blood from cancer. It is also used to prevent complications of cancer that has spread to the bone.  How should I use this medicine?  This medicine is for infusion into a vein. It is given by a health care professional in a hospital or clinic setting.  Talk to your pediatrician regarding the use of this medicine in children. Special care may be needed.  What side effects may I notice from receiving this medicine?  Side effects that you should report to your doctor or health care professional as soon as possible:  · allergic reactions like skin rash, itching or hives, swelling of the face, lips, or tongue  · anxiety, confusion, or depression  · breathing problems  · changes in vision  · eye pain  · feeling faint or lightheaded, falls  · jaw pain, especially after dental work  · mouth sores  · muscle cramps, stiffness, or weakness  · redness, blistering, peeling or loosening of the skin, including inside the mouth  · trouble passing urine or change in the amount of urine  Side effects that usually do not require medical attention (report to your doctor or health care professional if they continue or are bothersome):  · bone, joint, or muscle pain  · constipation  · diarrhea  · fever  · hair loss  · irritation at site where injected  · loss of appetite  · nausea, vomiting  · stomach upset  · trouble sleeping  · trouble swallowing  · weak or tired  What may interact with this medicine?  · certain antibiotics given by injection  · NSAIDs, medicines for pain and inflammation, like ibuprofen or naproxen  · some diuretics like bumetanide, furosemide  · teriparatide  · thalidomide  What if I miss a dose?  It is important not to miss your dose. Call your doctor or health care professional if you are unable to keep an  appointment.  Where should I keep my medicine?  This drug is given in a hospital or clinic and will not be stored at home.  What should I tell my health care provider before I take this medicine?  They need to know if you have any of these conditions:  · aspirin-sensitive asthma  · cancer, especially if you are receiving medicines used to treat cancer  · dental disease or wear dentures  · infection  · kidney disease  · receiving corticosteroids like dexamethasone or prednisone  · an unusual or allergic reaction to zoledronic acid, other medicines, foods, dyes, or preservatives  · pregnant or trying to get pregnant  · breast-feeding  What should I watch for while using this medicine?  Visit your doctor or health care professional for regular checkups. It may be some time before you see the benefit from this medicine. Do not stop taking your medicine unless your doctor tells you to. Your doctor may order blood tests or other tests to see how you are doing.  Women should inform their doctor if they wish to become pregnant or think they might be pregnant. There is a potential for serious side effects to an unborn child. Talk to your health care professional or pharmacist for more information.  You should make sure that you get enough calcium and vitamin D while you are taking this medicine. Discuss the foods you eat and the vitamins you take with your health care professional.  Some people who take this medicine have severe bone, joint, and/or muscle pain. This medicine may also increase your risk for jaw problems or a broken thigh bone. Tell your doctor right away if you have severe pain in your jaw, bones, joints, or muscles. Tell your doctor if you have any pain that does not go away or that gets worse.  Tell your dentist and dental surgeon that you are taking this medicine. You should not have major dental surgery while on this medicine. See your dentist to have a dental exam and fix any dental problems before  starting this medicine. Take good care of your teeth while on this medicine. Make sure you see your dentist for regular follow-up appointments.  Date Last Reviewed:   NOTE:This sheet is a summary. It may not cover all possible information. If you have questions about this medicine, talk to your doctor, pharmacist, or health care provider. Copyright© 2016 [employer]

## 2017-07-11 ENCOUNTER — TELEPHONE (OUTPATIENT)
Dept: NEPHROLOGY | Facility: CLINIC | Age: 72
End: 2017-07-11

## 2017-07-11 NOTE — TELEPHONE ENCOUNTER
No need for phos binders currently-she has excellent phos control.  Please proceed with prolia if indicated based on her DEXA-I would recommend monitoring for potential hypocalcemia which likely won't be a problem as her calcium is close to 10 range.  Thanks.

## 2017-07-11 NOTE — TELEPHONE ENCOUNTER
----- Message from Lang Cortez MD sent at 7/10/2017  6:21 PM CDT -----  Patient follows in primary care clinic with me. Based on last DEXA she meets general criteria for Prolia.  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  I don't typically initiate these therapies.  Thanks in advance for your assistance.

## 2017-07-14 ENCOUNTER — OFFICE VISIT (OUTPATIENT)
Dept: FAMILY MEDICINE | Facility: CLINIC | Age: 72
End: 2017-07-14
Payer: MEDICARE

## 2017-07-14 VITALS
DIASTOLIC BLOOD PRESSURE: 60 MMHG | OXYGEN SATURATION: 95 % | HEIGHT: 64 IN | BODY MASS INDEX: 29.17 KG/M2 | HEART RATE: 75 BPM | SYSTOLIC BLOOD PRESSURE: 124 MMHG | WEIGHT: 170.88 LBS

## 2017-07-14 DIAGNOSIS — Z00.00 ENCOUNTER FOR PREVENTIVE HEALTH EXAMINATION: Primary | ICD-10-CM

## 2017-07-14 DIAGNOSIS — E66.3 OVERWEIGHT (BMI 25.0-29.9): ICD-10-CM

## 2017-07-14 DIAGNOSIS — I10 ESSENTIAL HYPERTENSION: ICD-10-CM

## 2017-07-14 DIAGNOSIS — E78.1 HYPERTRIGLYCERIDEMIA: ICD-10-CM

## 2017-07-14 DIAGNOSIS — N18.4 CHRONIC KIDNEY DISEASE, STAGE IV (SEVERE): ICD-10-CM

## 2017-07-14 DIAGNOSIS — G62.0 POLYNEUROPATHY FOLLOWING CHEMOTHERAPY: ICD-10-CM

## 2017-07-14 DIAGNOSIS — T45.1X5A POLYNEUROPATHY FOLLOWING CHEMOTHERAPY: ICD-10-CM

## 2017-07-14 DIAGNOSIS — I70.0 AORTIC ATHEROSCLEROSIS: ICD-10-CM

## 2017-07-14 DIAGNOSIS — N25.81 HYPERPARATHYROIDISM DUE TO RENAL INSUFFICIENCY: ICD-10-CM

## 2017-07-14 DIAGNOSIS — M47.819 ARTHRITIS OF FACET JOINTS AT MULTIPLE VERTEBRAL LEVELS: ICD-10-CM

## 2017-07-14 PROCEDURE — G0439 PPPS, SUBSEQ VISIT: HCPCS | Mod: S$GLB,,, | Performed by: NURSE PRACTITIONER

## 2017-07-14 PROCEDURE — 99499 UNLISTED E&M SERVICE: CPT | Mod: S$GLB,,, | Performed by: NURSE PRACTITIONER

## 2017-07-14 PROCEDURE — 99999 PR PBB SHADOW E&M-EST. PATIENT-LVL IV: CPT | Mod: PBBFAC,,, | Performed by: NURSE PRACTITIONER

## 2017-07-14 NOTE — PROGRESS NOTES
"Misa Barajas presented for a  Medicare AWV and comprehensive Health Risk Assessment today. The following components were reviewed and updated:    · Medical history  · Family History  · Social history  · Allergies and Current Medications  · Health Risk Assessment  · Health Maintenance  · Care Team     ** See Completed Assessments for Annual Wellness Visit within the encounter summary.**       The following assessments were completed:  · Living Situation  · CAGE  · Depression Screening  · Timed Get Up and Go  · Whisper Test  · Cognitive Function Screening  · Nutrition Screening  · ADL Screening  · PAQ Screening    Vitals:    07/14/17 1309   BP: 124/60   BP Location: Right arm   Patient Position: Sitting   BP Method: Manual   Pulse: 75   SpO2: 95%   Weight: 77.5 kg (170 lb 13.7 oz)   Height: 5' 4" (1.626 m)     Body mass index is 29.33 kg/m².     Physical Exam   Constitutional: She is oriented to person, place, and time. She appears well-developed and well-nourished. No distress.   HENT:   Head: Normocephalic and atraumatic.   Eyes: EOM are normal. Pupils are equal, round, and reactive to light.   Neck: Neck supple. No JVD present. No tracheal deviation present.   Cardiovascular: Normal rate, regular rhythm, normal heart sounds and intact distal pulses.    No murmur heard.  Pulmonary/Chest: Effort normal and breath sounds normal. No respiratory distress. She has no wheezes. She has no rales.   Abdominal: Soft. Bowel sounds are normal. She exhibits no distension and no mass. There is no tenderness.   Musculoskeletal: Normal range of motion. She exhibits no edema or tenderness.   Neurological: She is alert and oriented to person, place, and time. Coordination normal.   Skin: Skin is warm and dry. No erythema. No pallor.   Psychiatric: She has a normal mood and affect. Her behavior is normal. Judgment and thought content normal. Cognition and memory are normal. She expresses no homicidal and no suicidal ideation. "   Nursing note and vitals reviewed.        Diagnoses and health risks identified today and associated recommendations/orders:    1. Encounter for preventive health examination    2. Essential hypertension  Chronic; stable on medication.  Followed by PCP.    3. Hypertriglyceridemia  Chronic; stable on medication.  Followed by Cardiology.    4. Aortic atherosclerosis  Chronic; stable.  Followed by Cardiology.    5. Chronic kidney disease, stage IV (severe)  Chronic; stable.  Followed by Nephrology.    6. Hyperparathyroidism due to renal insufficiency  Chronic; stable.  Followed by PCP.    7. Polyneuropathy following chemotherapy  Chronic; stable.  Followed by PCP.    8. Arthritis of facet joints at multiple vertebral levels  Chronic; stable on medication.  As seen on imaging dated 07/24/15.  Followed by PCP.    9. Overweight (BMI 25.0-29.9)  Chronic, stable. Therapeutic lifestyle changes discussed. Followed by PCP.      Provided Misa with a 5-10 year written screening schedule and personal prevention plan. Recommendations were developed using the USPSTF age appropriate recommendations. Education, counseling, and referrals were provided as needed. After Visit Summary printed and given to patient which includes a list of additional screenings\tests needed.    Return in 8 weeks (on 9/11/2017) for follow-up with PCP, HRA visit in 1 year.    Missy Waldrop NP

## 2017-07-20 ENCOUNTER — TELEPHONE (OUTPATIENT)
Dept: RADIOLOGY | Facility: HOSPITAL | Age: 72
End: 2017-07-20

## 2017-07-20 ENCOUNTER — OFFICE VISIT (OUTPATIENT)
Dept: HEMATOLOGY/ONCOLOGY | Facility: CLINIC | Age: 72
End: 2017-07-20
Payer: MEDICARE

## 2017-07-20 ENCOUNTER — HOSPITAL ENCOUNTER (OUTPATIENT)
Dept: RADIOLOGY | Facility: HOSPITAL | Age: 72
Discharge: HOME | End: 2017-07-20
Attending: INTERNAL MEDICINE
Payer: MEDICARE

## 2017-07-20 VITALS
TEMPERATURE: 98 F | HEIGHT: 64 IN | WEIGHT: 174.19 LBS | RESPIRATION RATE: 18 BRPM | BODY MASS INDEX: 29.74 KG/M2 | SYSTOLIC BLOOD PRESSURE: 129 MMHG | HEART RATE: 93 BPM | OXYGEN SATURATION: 95 % | DIASTOLIC BLOOD PRESSURE: 59 MMHG

## 2017-07-20 DIAGNOSIS — Z85.3 HX: BREAST CANCER: ICD-10-CM

## 2017-07-20 DIAGNOSIS — R92.1 BREAST CALCIFICATION, RIGHT: ICD-10-CM

## 2017-07-20 DIAGNOSIS — Z85.3 HISTORY OF BREAST CANCER: Primary | ICD-10-CM

## 2017-07-20 PROCEDURE — 77065 DX MAMMO INCL CAD UNI: CPT | Mod: 26,,, | Performed by: RADIOLOGY

## 2017-07-20 PROCEDURE — 77061 BREAST TOMOSYNTHESIS UNI: CPT | Mod: 26,,, | Performed by: RADIOLOGY

## 2017-07-20 PROCEDURE — 1159F MED LIST DOCD IN RCRD: CPT | Mod: S$GLB,,, | Performed by: INTERNAL MEDICINE

## 2017-07-20 PROCEDURE — 99999 PR PBB SHADOW E&M-EST. PATIENT-LVL III: CPT | Mod: PBBFAC,,, | Performed by: INTERNAL MEDICINE

## 2017-07-20 PROCEDURE — 1126F AMNT PAIN NOTED NONE PRSNT: CPT | Mod: S$GLB,,, | Performed by: INTERNAL MEDICINE

## 2017-07-20 PROCEDURE — 99214 OFFICE O/P EST MOD 30 MIN: CPT | Mod: S$GLB,,, | Performed by: INTERNAL MEDICINE

## 2017-07-20 NOTE — PROGRESS NOTES
Subjective:       Patient ID: Misa Barajas is a 71 y.o. female.    Chief Complaint: No chief complaint on file.    HPI     This is a 71-year-old female with a history of stage III left breast carcinoma diagnosed in May 2002.  She returns for follow-up.  She has been doing well in the interval. Weight stable.  No pain.  Weight stable.  Denies pain.    Mammogram today with right breast calcifications. Biopsy pending.    In regards to her breast cancer hx:  - She was diagnosed after an abnormal mammogram in May 2002.   - She underwent a left modified radical mastectomy and had 2 positive axillary lymph nodes identified. She was estrogen receptor   positive and progesterone receptor negative.   - She was treated with adjuvant chemotherapy with Adriamycin and Cyclophosphamide x 4 treatment cycles   followed by Taxotere for an additional 4 cycles per Dr. Kirk's previous notes.   - She was then placed on tamoxifen, which she completed 5 years. This was followed by 5 years of Femara.    Review of Systems   Constitutional: Negative for activity change (exercises- walks, stretches 4 x per week), appetite change, chills, diaphoresis, fatigue, fever and unexpected weight change.   HENT: Negative for congestion, dental problem, ear pain, hearing loss, mouth sores, nosebleeds, rhinorrhea, tinnitus and trouble swallowing.    Eyes: Negative for visual disturbance.   Respiratory: Negative for cough, chest tightness, shortness of breath and wheezing.    Cardiovascular: Negative for chest pain, palpitations and leg swelling.        Now on HTN Rx   Gastrointestinal: Negative for abdominal distention, abdominal pain, blood in stool, constipation, diarrhea, nausea and vomiting.        No prior colonoscopy   Genitourinary: Negative for decreased urine volume, difficulty urinating, dysuria, frequency and hematuria.   Musculoskeletal: Negative for arthralgias, back pain, gait problem, joint swelling, myalgias, neck pain and neck  stiffness.   Skin: Negative for pallor and rash.   Allergic/Immunologic: Positive for environmental allergies.   Neurological: Negative for dizziness, weakness, light-headedness, numbness and headaches.   Hematological: Negative for adenopathy. Does not bruise/bleed easily.   Psychiatric/Behavioral: Negative for confusion, dysphoric mood and sleep disturbance.       Objective:      Physical Exam   Constitutional: She is oriented to person, place, and time. She appears well-developed and well-nourished. No distress.   HENT:   Head: Normocephalic and atraumatic.   Right Ear: External ear normal.   Left Ear: External ear normal.   Nose: Nose normal.   Mouth/Throat: Oropharynx is clear and moist. No oropharyngeal exudate.   Normal tympanic membranes and ear canals   Eyes: Conjunctivae and EOM are normal. Pupils are equal, round, and reactive to light. Right eye exhibits no discharge. Left eye exhibits no discharge. No scleral icterus. Right pupil is round and reactive. Left pupil is round and reactive.   Neck: Normal range of motion. Neck supple. No tracheal deviation present. No thyromegaly present.   Cardiovascular: Normal rate, regular rhythm, normal heart sounds and intact distal pulses.  Exam reveals no gallop and no friction rub.    No murmur heard.  Pulmonary/Chest: Effort normal and breath sounds normal. No respiratory distress. She has no wheezes. She has no rales. She exhibits no tenderness.   Post left mastectomy without chest wall abnormality  No LAD  Right breast- no masses, no lymphadenopathy, no nipple irregularities   Abdominal: Soft. Bowel sounds are normal. She exhibits no distension and no mass. There is no hepatosplenomegaly. There is no tenderness. There is no rebound and no guarding.   No organomegaly   Musculoskeletal: Normal range of motion. She exhibits no edema or tenderness.   Lymphadenopathy:        Head (right side): No submandibular adenopathy present.        Head (left side): No  submandibular adenopathy present.     She has no cervical adenopathy.        Right cervical: No superficial cervical, no deep cervical and no posterior cervical adenopathy present.       Left cervical: No superficial cervical, no deep cervical and no posterior cervical adenopathy present.        Right: No inguinal and no supraclavicular adenopathy present.        Left: No inguinal and no supraclavicular adenopathy present.   Neurological: She is alert and oriented to person, place, and time. She has normal strength. No cranial nerve deficit or sensory deficit. She exhibits normal muscle tone. Coordination normal.   Skin: Skin is warm and dry. No bruising, no lesion, no petechiae and no rash noted. She is not diaphoretic. No erythema. No pallor.   Psychiatric: She has a normal mood and affect. Her behavior is normal. Judgment and thought content normal. Her mood appears not anxious. She does not exhibit a depressed mood.   Nursing note and vitals reviewed.    Labs- reviewed  Assessment:       1. History of breast cancer    2. Breast calcification, right        Plan:     1. DAISY clinically  2. Biopsy pending  RTC post

## 2017-07-20 NOTE — TELEPHONE ENCOUNTER
Spoke with patient. Reviewed breast biopsy procedure and reviewed instructions for breast biopsy. Patient expressed understanding and all questions were answered. Provided patient with my phone number to call for any further concerns or questions.   Patient scheduled breast biopsy at the [facility] on 7/28/17

## 2017-07-28 ENCOUNTER — HOSPITAL ENCOUNTER (OUTPATIENT)
Dept: RADIOLOGY | Facility: HOSPITAL | Age: 72
Discharge: HOME | End: 2017-07-28
Attending: INTERNAL MEDICINE
Payer: MEDICARE

## 2017-07-28 DIAGNOSIS — R92.8 ABNORMAL MAMMOGRAM: ICD-10-CM

## 2017-07-28 PROCEDURE — 27201044 MAMMO BREAST STEREOTACTIC BREAST BIOPSY RIGHT

## 2017-07-28 PROCEDURE — 19081 BX BREAST 1ST LESION STRTCTC: CPT | Mod: 26,RT,, | Performed by: RADIOLOGY

## 2017-07-28 PROCEDURE — 19081 BX BREAST 1ST LESION STRTCTC: CPT | Mod: TC,RT

## 2017-07-28 PROCEDURE — 88305 TISSUE EXAM BY PATHOLOGIST: CPT | Mod: 26,,, | Performed by: PATHOLOGY

## 2017-07-28 PROCEDURE — 88342 IMHCHEM/IMCYTCHM 1ST ANTB: CPT | Mod: 26,,, | Performed by: PATHOLOGY

## 2017-07-28 PROCEDURE — 88342 IMHCHEM/IMCYTCHM 1ST ANTB: CPT | Performed by: PATHOLOGY

## 2017-07-28 PROCEDURE — 88305 TISSUE EXAM BY PATHOLOGIST: CPT | Mod: 59 | Performed by: PATHOLOGY

## 2017-08-02 ENCOUNTER — TELEPHONE (OUTPATIENT)
Dept: SURGERY | Facility: CLINIC | Age: 72
End: 2017-08-02

## 2017-08-02 NOTE — TELEPHONE ENCOUNTER
Patient called with her biopsy results from 7/28/17. Explained that it showed fibroadenomatoid tissue,  no atypia/benign, all questions answered, pt advised to follow up in 6 months with repeat imaging per core biopsy protocol, advised case will be reviewed in the weekly core conference and pt will be notified if there are any changes. Patient verbalized understanding of all information

## 2017-08-04 ENCOUNTER — PATIENT MESSAGE (OUTPATIENT)
Dept: INTERNAL MEDICINE | Facility: CLINIC | Age: 72
End: 2017-08-04

## 2017-08-14 ENCOUNTER — PATIENT MESSAGE (OUTPATIENT)
Dept: INTERNAL MEDICINE | Facility: CLINIC | Age: 72
End: 2017-08-14

## 2017-08-17 ENCOUNTER — OFFICE VISIT (OUTPATIENT)
Dept: CARDIOLOGY | Facility: CLINIC | Age: 72
End: 2017-08-17
Payer: MEDICARE

## 2017-08-17 VITALS
DIASTOLIC BLOOD PRESSURE: 69 MMHG | SYSTOLIC BLOOD PRESSURE: 111 MMHG | OXYGEN SATURATION: 95 % | HEIGHT: 64 IN | HEART RATE: 79 BPM | WEIGHT: 175.13 LBS | BODY MASS INDEX: 29.9 KG/M2

## 2017-08-17 DIAGNOSIS — I45.10 RBBB: ICD-10-CM

## 2017-08-17 DIAGNOSIS — I10 ESSENTIAL HYPERTENSION: Primary | ICD-10-CM

## 2017-08-17 DIAGNOSIS — R94.31 ABNORMAL ECG: ICD-10-CM

## 2017-08-17 DIAGNOSIS — E66.3 OVERWEIGHT (BMI 25.0-29.9): ICD-10-CM

## 2017-08-17 DIAGNOSIS — I51.89 LEFT VENTRICULAR DIASTOLIC DYSFUNCTION WITH PRESERVED SYSTOLIC FUNCTION: ICD-10-CM

## 2017-08-17 DIAGNOSIS — E78.1 HYPERTRIGLYCERIDEMIA: ICD-10-CM

## 2017-08-17 DIAGNOSIS — I47.29 NSVT (NONSUSTAINED VENTRICULAR TACHYCARDIA): ICD-10-CM

## 2017-08-17 PROCEDURE — 1126F AMNT PAIN NOTED NONE PRSNT: CPT | Mod: S$GLB,,, | Performed by: INTERNAL MEDICINE

## 2017-08-17 PROCEDURE — 3074F SYST BP LT 130 MM HG: CPT | Mod: S$GLB,,, | Performed by: INTERNAL MEDICINE

## 2017-08-17 PROCEDURE — 99499 UNLISTED E&M SERVICE: CPT | Mod: S$GLB,,, | Performed by: INTERNAL MEDICINE

## 2017-08-17 PROCEDURE — 1159F MED LIST DOCD IN RCRD: CPT | Mod: S$GLB,,, | Performed by: INTERNAL MEDICINE

## 2017-08-17 PROCEDURE — 99214 OFFICE O/P EST MOD 30 MIN: CPT | Mod: S$GLB,,, | Performed by: INTERNAL MEDICINE

## 2017-08-17 PROCEDURE — 3008F BODY MASS INDEX DOCD: CPT | Mod: S$GLB,,, | Performed by: INTERNAL MEDICINE

## 2017-08-17 PROCEDURE — 99999 PR PBB SHADOW E&M-EST. PATIENT-LVL III: CPT | Mod: PBBFAC,,, | Performed by: INTERNAL MEDICINE

## 2017-08-17 PROCEDURE — 3078F DIAST BP <80 MM HG: CPT | Mod: S$GLB,,, | Performed by: INTERNAL MEDICINE

## 2017-08-17 NOTE — PROGRESS NOTES
Subjective:   Patient ID:  Misa Barajas is a 71 y.o. female who presents for follow-up of Follow-up      Problem List Items Addressed This Visit        Cardiac/Vascular    HTN (hypertension) - Primary    Left ventricular diastolic dysfunction with preserved systolic function    Abnormal ECG    RBBB    Hypertriglyceridemia    NSVT (nonsustained ventricular tachycardia)       Endocrine    Overweight (BMI 25.0-29.9)      Other Visit Diagnoses    None.         HPI: 70 y/o female with abnormal ECG had abnormal holter showing NSVT. She had no pauses. Echo showed normal ef with DD. She denies any palpitations, dizziness or syncope. No chest pain or dyspnea. BP is controlled.   No Family history of CAD and she try to keep active walking 3-4 times weekly for 15-20 minutes.     Review of Systems   Constitution: Negative.   HENT: Negative.    Eyes: Negative.    Cardiovascular: Negative.    Respiratory: Negative.    Endocrine: Negative.    Hematologic/Lymphatic: Negative.    Skin: Negative.    Musculoskeletal: Negative.    Gastrointestinal: Negative.    Neurological: Negative.    Psychiatric/Behavioral: Negative.        Patient's Medications   New Prescriptions    No medications on file   Previous Medications    ACETAMINOPHEN (TYLENOL) 500 MG TABLET    Take 1 tablet (500 mg total) by mouth every 6 (six) hours as needed for Pain.    AMLODIPINE (NORVASC) 5 MG TABLET    Take 1 tablet (5 mg total) by mouth nightly.    ASPIRIN (ECOTRIN) 81 MG EC TABLET    Take 81 mg by mouth once daily.    BIOTIN 1 MG TABLET    Take 1,000 mcg by mouth 3 (three) times daily.    CHOLECALCIFEROL, VITAMIN D3, 1,000 UNIT CAPSULE    Take 2 capsules (2,000 Units total) by mouth once daily.    FISH OIL-OMEGA-3 FATTY ACIDS 300-1,000 MG CAPSULE    Take 2 g by mouth once daily.    FLUTICASONE (FLONASE) 50 MCG/ACTUATION NASAL SPRAY    2 sprays once daily.  Use as directed    GARLIC ORAL    Take by mouth.    LOSARTAN (COZAAR) 50 MG TABLET    Take 1 tablet  (50 mg total) by mouth once daily.    TAMSULOSIN (FLOMAX) 0.4 MG CP24    Take 1 capsule (0.4 mg total) by mouth once daily.    VITAMIN B COMPLEX NO.12-NIACIN ORAL    Take by mouth.   Modified Medications    No medications on file   Discontinued Medications    No medications on file       Objective:   Physical Exam   Constitutional: She is oriented to person, place, and time. She appears well-developed and well-nourished. No distress.   Examination of the digits showed no clubbing or cyanosis   HENT:   Head: Normocephalic and atraumatic.   Eyes: Conjunctivae are normal. Pupils are equal, round, and reactive to light. Right eye exhibits no discharge.   Neck: Normal range of motion. Neck supple. No JVD present. No thyromegaly present.   No carotid bruits   Cardiovascular: Normal rate, regular rhythm, S1 normal, S2 normal, normal heart sounds, intact distal pulses and normal pulses.  PMI is not displaced.  Exam reveals no gallop, no friction rub and no opening snap.    No murmur heard.  Pulmonary/Chest: Effort normal and breath sounds normal. No respiratory distress. She has no wheezes. She has no rales. She exhibits no tenderness.   Abdominal: Soft. Bowel sounds are normal. She exhibits no distension and no mass. There is no tenderness. There is no guarding.   No hepatosplenomegaly   Musculoskeletal: Normal range of motion. She exhibits no edema or tenderness.   Lymphadenopathy:     She has no cervical adenopathy.   Neurological: She is alert and oriented to person, place, and time.   Skin: Skin is warm. No rash noted. She is not diaphoretic. No erythema.   Psychiatric: She has a normal mood and affect.   Nursing note and vitals reviewed.      ECGs reviewed-NSR with RBBB with LAFB and 1st degree AV block  LABS reviewed  Imaging including Echoes reviewed    Assessment:     1. Essential hypertension    2. NSVT (nonsustained ventricular tachycardia)    3. RBBB    4. Hypertriglyceridemia    5. Abnormal ECG    6. Left  ventricular diastolic dysfunction with preserved systolic function    7. Overweight (BMI 25.0-29.9)        Plan:     Nuclear stress  Activity as tolerate  Phone review of results    F/u in 6 months

## 2017-08-21 DIAGNOSIS — Z12.11 COLON CANCER SCREENING: ICD-10-CM

## 2017-09-05 ENCOUNTER — OFFICE VISIT (OUTPATIENT)
Dept: HEMATOLOGY/ONCOLOGY | Facility: CLINIC | Age: 72
End: 2017-09-05
Payer: MEDICARE

## 2017-09-05 VITALS
OXYGEN SATURATION: 94 % | TEMPERATURE: 98 F | BODY MASS INDEX: 30.12 KG/M2 | WEIGHT: 175.5 LBS | DIASTOLIC BLOOD PRESSURE: 75 MMHG | RESPIRATION RATE: 20 BRPM | HEART RATE: 78 BPM | SYSTOLIC BLOOD PRESSURE: 141 MMHG

## 2017-09-05 DIAGNOSIS — E55.9 VITAMIN D DEFICIENCY: ICD-10-CM

## 2017-09-05 DIAGNOSIS — Z85.3 HISTORY OF BREAST CANCER: Primary | ICD-10-CM

## 2017-09-05 PROCEDURE — 99999 PR PBB SHADOW E&M-EST. PATIENT-LVL III: CPT | Mod: PBBFAC,,, | Performed by: INTERNAL MEDICINE

## 2017-09-05 PROCEDURE — 1126F AMNT PAIN NOTED NONE PRSNT: CPT | Mod: S$GLB,,, | Performed by: INTERNAL MEDICINE

## 2017-09-05 PROCEDURE — 3008F BODY MASS INDEX DOCD: CPT | Mod: S$GLB,,, | Performed by: INTERNAL MEDICINE

## 2017-09-05 PROCEDURE — 3078F DIAST BP <80 MM HG: CPT | Mod: S$GLB,,, | Performed by: INTERNAL MEDICINE

## 2017-09-05 PROCEDURE — 3077F SYST BP >= 140 MM HG: CPT | Mod: S$GLB,,, | Performed by: INTERNAL MEDICINE

## 2017-09-05 PROCEDURE — 99214 OFFICE O/P EST MOD 30 MIN: CPT | Mod: S$GLB,,, | Performed by: INTERNAL MEDICINE

## 2017-09-05 PROCEDURE — 1159F MED LIST DOCD IN RCRD: CPT | Mod: S$GLB,,, | Performed by: INTERNAL MEDICINE

## 2017-09-05 NOTE — PROGRESS NOTES
Subjective:       Patient ID: Misa Barajas is a 71 y.o. female.    Chief Complaint: Follow-up    HPI     This is a 71-year-old female with a history of stage III left breast carcinoma diagnosed in May 2002.  She returns for follow-up.  In July she had right breast calcifications on her mammogram- biopsy negative.  She has been doing well in the interval. Weight stable.  No pain.  Weight stable.  Denies pain.    In regards to her breast cancer hx:  - She was diagnosed after an abnormal mammogram in May 2002.   - She underwent a left modified radical mastectomy and had 2 positive axillary lymph nodes identified. She was estrogen receptor   positive and progesterone receptor negative.   - She was treated with adjuvant chemotherapy with Adriamycin and Cyclophosphamide x 4 treatment cycles   followed by Taxotere for an additional 4 cycles per Dr. Kirk's previous notes.   - She was then placed on tamoxifen, which she completed 5 years. This was followed by 5 years of Femara.     Review of Systems   Constitutional: Negative for activity change, appetite change (eating more bland), chills, fatigue, fever and unexpected weight change.   HENT: Negative for congestion, dental problem, postnasal drip, rhinorrhea, sinus pain, sinus pressure, sneezing, sore throat, tinnitus, trouble swallowing and voice change.    Eyes: Negative for visual disturbance.   Respiratory: Negative for cough, chest tightness, shortness of breath and wheezing.    Cardiovascular: Negative for chest pain, palpitations and leg swelling.        Reports has cardiac stress test pending- no current sxs   Gastrointestinal: Negative for abdominal distention, abdominal pain, blood in stool and diarrhea.   Genitourinary: Negative for decreased urine volume, difficulty urinating, frequency and urgency.   Musculoskeletal: Negative for arthralgias, back pain, joint swelling and myalgias.   Skin: Negative for rash.   Neurological: Negative for dizziness,  weakness, light-headedness, numbness and headaches.   Hematological: Negative for adenopathy. Does not bruise/bleed easily.   Psychiatric/Behavioral: Negative for dysphoric mood. The patient is not nervous/anxious.        Objective:      Physical Exam   Constitutional: She is oriented to person, place, and time. She appears well-developed and well-nourished. No distress.   HENT:   Head: Normocephalic and atraumatic.   Right Ear: External ear normal.   Left Ear: External ear normal.   Nose: Nose normal.   Mouth/Throat: Oropharynx is clear and moist. No oropharyngeal exudate.   Normal tympanic membranes and ear canals   Eyes: Conjunctivae and EOM are normal. Pupils are equal, round, and reactive to light. Right eye exhibits no discharge. Left eye exhibits no discharge. No scleral icterus. Right pupil is round and reactive. Left pupil is round and reactive.   Neck: Normal range of motion. Neck supple. No JVD present. No tracheal deviation present. No thyromegaly present.   Cardiovascular: Normal rate, regular rhythm, normal heart sounds and intact distal pulses.  Exam reveals no gallop and no friction rub.    No murmur heard.  Pulmonary/Chest: Effort normal and breath sounds normal. No respiratory distress. She has no wheezes. She has no rales. She exhibits no tenderness.   Post left mastectomy without chest wall abnormality  No LAD  Right breast- no masses, no lymphadenopathy, no nipple irregularities   Abdominal: Soft. Bowel sounds are normal. She exhibits no distension and no mass. There is no hepatosplenomegaly. There is no tenderness. There is no rebound and no guarding.   No organomegaly   Musculoskeletal: Normal range of motion. She exhibits no edema or tenderness.   Lymphadenopathy:        Head (right side): No submandibular adenopathy present.        Head (left side): No submandibular adenopathy present.     She has no cervical adenopathy.        Right cervical: No superficial cervical, no deep cervical and no  posterior cervical adenopathy present.       Left cervical: No superficial cervical, no deep cervical and no posterior cervical adenopathy present.        Right: No inguinal and no supraclavicular adenopathy present.        Left: No inguinal and no supraclavicular adenopathy present.   Neurological: She is alert and oriented to person, place, and time. She has normal strength and normal reflexes. No cranial nerve deficit or sensory deficit. She exhibits normal muscle tone. Coordination normal.   Skin: Skin is warm and dry. No bruising, no lesion, no petechiae and no rash noted. She is not diaphoretic. No erythema. No pallor.   Psychiatric: She has a normal mood and affect. Her behavior is normal. Judgment and thought content normal. Her mood appears not anxious. She does not exhibit a depressed mood.   Nursing note and vitals reviewed.    Labs- reviewed  Assessment:       1. History of breast cancer    2. Vitamin D deficiency        Plan:     1. DAISY  Repeat mammogram planned for 6 months  2. Replaced

## 2017-09-06 ENCOUNTER — HOSPITAL ENCOUNTER (OUTPATIENT)
Dept: CARDIOLOGY | Facility: HOSPITAL | Age: 72
Discharge: HOME | End: 2017-09-06
Attending: INTERNAL MEDICINE
Payer: MEDICARE

## 2017-09-06 ENCOUNTER — HOSPITAL ENCOUNTER (OUTPATIENT)
Dept: RADIOLOGY | Facility: HOSPITAL | Age: 72
Discharge: HOME | End: 2017-09-06
Attending: INTERNAL MEDICINE
Payer: MEDICARE

## 2017-09-06 DIAGNOSIS — I47.29 NSVT (NONSUSTAINED VENTRICULAR TACHYCARDIA): ICD-10-CM

## 2017-09-06 DIAGNOSIS — R94.31 ABNORMAL ECG: ICD-10-CM

## 2017-09-06 PROCEDURE — 78452 HT MUSCLE IMAGE SPECT MULT: CPT | Mod: 26,,, | Performed by: RADIOLOGY

## 2017-09-06 PROCEDURE — 93016 CV STRESS TEST SUPVJ ONLY: CPT | Mod: ,,, | Performed by: INTERNAL MEDICINE

## 2017-09-06 PROCEDURE — 78452 HT MUSCLE IMAGE SPECT MULT: CPT | Mod: TC

## 2017-09-06 PROCEDURE — 93018 CV STRESS TEST I&R ONLY: CPT | Mod: ,,, | Performed by: INTERNAL MEDICINE

## 2017-09-07 ENCOUNTER — TELEPHONE (OUTPATIENT)
Dept: CARDIOLOGY | Facility: CLINIC | Age: 72
End: 2017-09-07

## 2017-09-07 LAB — DIASTOLIC DYSFUNCTION: NO

## 2017-09-11 ENCOUNTER — OFFICE VISIT (OUTPATIENT)
Dept: INTERNAL MEDICINE | Facility: CLINIC | Age: 72
End: 2017-09-11
Payer: MEDICARE

## 2017-09-11 VITALS
TEMPERATURE: 73 F | DIASTOLIC BLOOD PRESSURE: 64 MMHG | WEIGHT: 173.75 LBS | OXYGEN SATURATION: 98 % | SYSTOLIC BLOOD PRESSURE: 130 MMHG | BODY MASS INDEX: 29.66 KG/M2 | HEIGHT: 64 IN

## 2017-09-11 DIAGNOSIS — Z13.220 ENCOUNTER FOR SCREENING FOR LIPID DISORDER: ICD-10-CM

## 2017-09-11 DIAGNOSIS — M85.80 OSTEOPENIA, UNSPECIFIED LOCATION: Primary | ICD-10-CM

## 2017-09-11 DIAGNOSIS — M89.9 DISORDER OF BONE: ICD-10-CM

## 2017-09-11 DIAGNOSIS — Z13.220 ENCOUNTER FOR LIPID SCREENING FOR CARDIOVASCULAR DISEASE: ICD-10-CM

## 2017-09-11 DIAGNOSIS — Z13.6 ENCOUNTER FOR LIPID SCREENING FOR CARDIOVASCULAR DISEASE: ICD-10-CM

## 2017-09-11 PROCEDURE — 99999 PR PBB SHADOW E&M-EST. PATIENT-LVL III: CPT | Mod: PBBFAC,,, | Performed by: INTERNAL MEDICINE

## 2017-09-11 PROCEDURE — 3078F DIAST BP <80 MM HG: CPT | Mod: S$GLB,,, | Performed by: INTERNAL MEDICINE

## 2017-09-11 PROCEDURE — 1126F AMNT PAIN NOTED NONE PRSNT: CPT | Mod: S$GLB,,, | Performed by: INTERNAL MEDICINE

## 2017-09-11 PROCEDURE — 99213 OFFICE O/P EST LOW 20 MIN: CPT | Mod: S$GLB,,, | Performed by: INTERNAL MEDICINE

## 2017-09-11 PROCEDURE — 1159F MED LIST DOCD IN RCRD: CPT | Mod: S$GLB,,, | Performed by: INTERNAL MEDICINE

## 2017-09-11 PROCEDURE — 3008F BODY MASS INDEX DOCD: CPT | Mod: S$GLB,,, | Performed by: INTERNAL MEDICINE

## 2017-09-11 PROCEDURE — 3075F SYST BP GE 130 - 139MM HG: CPT | Mod: S$GLB,,, | Performed by: INTERNAL MEDICINE

## 2017-09-17 NOTE — PROGRESS NOTES
Portions of this note are generated with voice recognition software. Typographical errors may exist.     SUBJECTIVE:    This is a/an 71 y.o. female here for primary care visit for  Chief Complaint   Patient presents with    Osteopenia     2 mos follow up     Patient states that she continues to be reluctant to start bisphosphonate therapy.  Anticipatory anxiety about side effects.  States that she had some side effects such as nausea and fatigue with chemotherapy and she sees that these could be a possibility with bisphosphonate and she doesn't want to risk this.  Patient is not engaged and resistance exercise to increase muscle mass.  States that she would be willing to do this.  Complying with vitamin D supplementation.  Denies alcohol misuse.      Medications Reviewed and Updated    Past medical, family, and social histories were reviewed and updated.    Review of Systems negative unless otherwise noted in history of present illness-  ROS    General ROS: negative  Psychological ROS: negative  Cardiovascular ROS: negative  Gastrointestinal ROS: negative  Genito-Urinary ROS: negative  Musculoskeletal ROS: negative  Neurological ROS: negative  Dermatological ROS: negative        Allergic:    Review of patient's allergies indicates:   Allergen Reactions    Adhesive Rash     Pt states she removed a LATEX bandaid and the skin beneath was swollen and red. No other latex causes a reaction.       OBJECTIVE:  BP: 130/64   Temp: (!) 73 °F (22.8 °C)  Wt Readings from Last 3 Encounters:   09/11/17 78.8 kg (173 lb 11.6 oz)   09/05/17 79.6 kg (175 lb 7.8 oz)   08/17/17 79.4 kg (175 lb 1.6 oz)    Body mass index is 29.82 kg/m².  Previous Blood Pressure Readings :   BP Readings from Last 3 Encounters:   09/11/17 130/64   09/05/17 (!) 141/75   08/17/17 111/69       Physical Exam    GEN: No apparent distress  HEENT: sclera non-icteric, conjunctiva clear  CV: no peripheral edema  PULM: breathing non-labored  ABD: non, protuberant  abdomen.  PSYCH: appropriate affect  MSK: able to rise from chair without assistance  SKIN: normal skin turgor    Pertinent Labs Reviewed       ASSESSMENT/PLAN:    Osteopenia, unspecified location.Condition stable.  Counseling on self-care measures. Plan to monitor clinically. Continue current medical plan.   -     DXA Bone Density Spine And Hip; Future; Expected date: 09/11/2017    Encounter for screening for lipid disorder  -     Lipid panel; Future; Expected date: 09/11/2017    Encounter for lipid screening for cardiovascular disease   -     Lipid panel; Future; Expected date: 09/11/2017          Future Appointments  Date Time Provider Department Center   9/19/2017 8:30 AM [facility] CT2 64- LIMIT 600 LBS [facility] CT SCAN [facility]   9/19/2017 9:45 AM Bonnie Knutson MD [facility] UROLOGY [facility]   2/12/2018 9:00 AM LAB, [facility] [place]   2/14/2018 9:00 AM Lang Cortez MD [facility] Cortez  9/16/2017  9:55 PM

## 2017-09-19 ENCOUNTER — HOSPITAL ENCOUNTER (OUTPATIENT)
Dept: RADIOLOGY | Facility: HOSPITAL | Age: 72
Discharge: HOME | End: 2017-09-19
Attending: UROLOGY
Payer: MEDICARE

## 2017-09-19 ENCOUNTER — OFFICE VISIT (OUTPATIENT)
Dept: UROLOGY | Facility: CLINIC | Age: 72
End: 2017-09-19
Payer: MEDICARE

## 2017-09-19 VITALS
HEIGHT: 64 IN | SYSTOLIC BLOOD PRESSURE: 135 MMHG | WEIGHT: 173.75 LBS | DIASTOLIC BLOOD PRESSURE: 71 MMHG | BODY MASS INDEX: 29.66 KG/M2 | HEART RATE: 84 BPM

## 2017-09-19 DIAGNOSIS — N25.81 HYPERPARATHYROIDISM DUE TO RENAL INSUFFICIENCY: Primary | ICD-10-CM

## 2017-09-19 DIAGNOSIS — N20.0 NEPHROLITHIASIS: ICD-10-CM

## 2017-09-19 DIAGNOSIS — S37.091A: ICD-10-CM

## 2017-09-19 DIAGNOSIS — N18.4 CHRONIC KIDNEY DISEASE, STAGE IV (SEVERE): ICD-10-CM

## 2017-09-19 PROCEDURE — 74176 CT ABD & PELVIS W/O CONTRAST: CPT | Mod: 26,,, | Performed by: RADIOLOGY

## 2017-09-19 PROCEDURE — 1126F AMNT PAIN NOTED NONE PRSNT: CPT | Mod: S$GLB,,, | Performed by: UROLOGY

## 2017-09-19 PROCEDURE — 3075F SYST BP GE 130 - 139MM HG: CPT | Mod: S$GLB,,, | Performed by: UROLOGY

## 2017-09-19 PROCEDURE — 1159F MED LIST DOCD IN RCRD: CPT | Mod: S$GLB,,, | Performed by: UROLOGY

## 2017-09-19 PROCEDURE — 99999 PR PBB SHADOW E&M-EST. PATIENT-LVL III: CPT | Mod: PBBFAC,,, | Performed by: UROLOGY

## 2017-09-19 PROCEDURE — 3078F DIAST BP <80 MM HG: CPT | Mod: S$GLB,,, | Performed by: UROLOGY

## 2017-09-19 PROCEDURE — 74176 CT ABD & PELVIS W/O CONTRAST: CPT | Mod: TC

## 2017-09-19 PROCEDURE — 99214 OFFICE O/P EST MOD 30 MIN: CPT | Mod: S$GLB,,, | Performed by: UROLOGY

## 2017-09-19 PROCEDURE — 3008F BODY MASS INDEX DOCD: CPT | Mod: S$GLB,,, | Performed by: UROLOGY

## 2017-09-19 NOTE — PROGRESS NOTES
[facility] 4th Floor  Urology  Clinic Note    Patient Name: Misa Barajas  MRN: [id number]  Primary Care Physician: Lang Cortez MD    Subjective:     HPI:    ESWL for right nephrolithiasis (4/4/2017), She is s/p a right ESWL 4/17 complicated with a right perinephric hematoma.   CT and MRI performed at that time.     Notes nocturia 3-4x/night (takes AM lasix dose and lasix dose  before bed). Previously noted nocturia x 1. Patient followed by Dr. Ross    No frequency, urgency, dysuria, hematuria, trouble urinating.      CT done 4/17 showed a stone in right lower pole, but only showed portion of kidney and possible right renal mass.  Passed a bunch of fragments, painless.     6/2014 bilateral ureteroscopy, then another 6/2014 right ureteroscopy to clear right stone burden.    Repeat 24 hour urine showed high oxalate and sodium.  Limited her diet in sodium. Last 24 hour urine showed borderline elevated oxalate.  Stones were calcium oxalate monohydrate, althought HF were 500-600. Had a biopsy of right ureteral area where stones were impacted. Biopsy was negative.     Former smoker. Quit 5 years ago.  1 pack per day.    Review of Systems   Constitutional: Negative for appetite change, chills, diaphoresis, fever and unexpected weight change.   Respiratory: Negative for shortness of breath.    Cardiovascular: Negative for chest pain.   Gastrointestinal: Negative for abdominal pain, blood in stool, constipation, diarrhea, nausea and vomiting.   Genitourinary: Positive for flank pain and nocturia (3-4x/night). Negative for difficulty urinating, dysuria, frequency, hematuria and urgency.   Neurological: Negative for dizziness, seizures and syncope.     Objective:        Body mass index is 29.82 kg/m².       Lines/Drains/Airways     Drain                 Ureteral Drain/Stent 06/16/14 Right ureter 6 Fr. 1191 days              Physical Exam   Constitutional: She is oriented to person, place, and time. She  appears well-developed and well-nourished. No distress.   HENT:   Head: Normocephalic and atraumatic.   Eyes: Pupils are equal, round, and reactive to light.   Neck: Normal range of motion.   Cardiovascular: Normal rate.    Pulmonary/Chest: Effort normal. No respiratory distress.   Abdominal: Soft. She exhibits no distension.   Genitourinary:   Genitourinary Comments: No CVAT   Musculoskeletal:   No pitting edema   Neurological: She is alert and oriented to person, place, and time.   Skin: Skin is warm and dry. She is not diaphoretic.         Significant Labs:    Chemistry        Component Value Date/Time     07/20/2017 1057    K 4.0 07/20/2017 1057     07/20/2017 1057    CO2 24 07/20/2017 1057    BUN 37 (H) 07/20/2017 1057    CREATININE 1.6 (H) 07/20/2017 1057    GLU 97 07/20/2017 1057        Component Value Date/Time    CALCIUM 10.4 07/20/2017 1057    ALKPHOS 89 07/20/2017 1057    AST 24 07/20/2017 1057    ALT 22 07/20/2017 1057    BILITOT 0.6 07/20/2017 1057    ESTGFRAFRICA 36.6 (A) 07/20/2017 1057    EGFRNONAA 31.7 (A) 07/20/2017 1057        Lab Results   Component Value Date    WBC 8.23 07/20/2017    HGB 14.2 07/20/2017    HCT 41.9 07/20/2017    MCV 88 07/20/2017     07/20/2017       Urine Studies:     Significant Imaging:  CT: I have reviewed all results within the past 24 hours and my personal findings are:  Stable bilateral stones. No changes from 6/17 CT           Assessment/Plan:       Misa Barajas is a 71 y.o. female with perinephric hematoma s/p ESWL that has resolved and stable nephrolithiasis over the last 4 mo.     Follow up in 6 months with kub and ultrasound.         Miracle Carmen MD  PGY-2  Urology    I personally saw and evaluated patient and agree with treatment evaluation and plan.   I spent 25 minutes with the patient of which more than half was spent in direct consultation with the patient in regards to our treatment and plan.

## 2017-09-22 DIAGNOSIS — I10 ESSENTIAL HYPERTENSION: ICD-10-CM

## 2017-09-22 RX ORDER — LOSARTAN POTASSIUM 50 MG/1
TABLET ORAL
Qty: 90 TABLET | Refills: 1 | Status: SHIPPED | OUTPATIENT
Start: 2017-09-22 | End: 2018-03-24 | Stop reason: SDUPTHER

## 2017-11-21 ENCOUNTER — OFFICE VISIT (OUTPATIENT)
Dept: OPTOMETRY | Facility: CLINIC | Age: 72
End: 2017-11-21
Payer: MEDICARE

## 2017-11-21 VITALS — SYSTOLIC BLOOD PRESSURE: 132 MMHG | DIASTOLIC BLOOD PRESSURE: 82 MMHG

## 2017-11-21 DIAGNOSIS — H52.7 REFRACTIVE ERROR: ICD-10-CM

## 2017-11-21 DIAGNOSIS — H25.10 NUCLEAR SCLEROSIS, UNSPECIFIED LATERALITY: Primary | ICD-10-CM

## 2017-11-21 PROCEDURE — 92014 COMPRE OPH EXAM EST PT 1/>: CPT | Mod: S$GLB,,, | Performed by: OPTOMETRIST

## 2017-11-21 PROCEDURE — 92015 DETERMINE REFRACTIVE STATE: CPT | Mod: S$GLB,,, | Performed by: OPTOMETRIST

## 2017-11-21 PROCEDURE — 99999 PR PBB SHADOW E&M-EST. PATIENT-LVL II: CPT | Mod: PBBFAC,,, | Performed by: OPTOMETRIST

## 2017-11-21 NOTE — PROGRESS NOTES
ANNIA FONSECA 11/2016 Distance not as clear OD.  Glasses about 2 yrs. Old./82   today.    Last edited by Karol Wetzel on 11/21/2017 10:34 AM. (History)            Assessment /Plan     For exam results, see Encounter Report.    Nuclear sclerosis, unspecified laterality    Refractive error      1. Educated pt on presence of cataracts and effects on vision. No surgery at this time. Recheck in one year.  2. Spec Rx given. Different lens options discussed with patient. RTC 1 year full exam.

## 2017-11-22 RX ORDER — AMLODIPINE BESYLATE 5 MG/1
TABLET ORAL
Qty: 90 TABLET | Refills: 1 | Status: SHIPPED | OUTPATIENT
Start: 2017-11-22 | End: 2018-02-14

## 2017-12-04 ENCOUNTER — LAB VISIT (OUTPATIENT)
Dept: LAB | Facility: HOSPITAL | Age: 72
End: 2017-12-04
Attending: INTERNAL MEDICINE
Payer: MEDICARE

## 2017-12-04 DIAGNOSIS — I10 ESSENTIAL HYPERTENSION: ICD-10-CM

## 2017-12-04 DIAGNOSIS — N20.0 NEPHROLITHIASIS: ICD-10-CM

## 2017-12-04 DIAGNOSIS — N25.0 RENAL OSTEODYSTROPHY: ICD-10-CM

## 2017-12-04 DIAGNOSIS — N18.30 CKD (CHRONIC KIDNEY DISEASE) STAGE 3, GFR 30-59 ML/MIN: ICD-10-CM

## 2017-12-04 LAB — PTH-INTACT SERPL-MCNC: 110 PG/ML

## 2017-12-04 PROCEDURE — 83970 ASSAY OF PARATHORMONE: CPT

## 2017-12-04 PROCEDURE — 36415 COLL VENOUS BLD VENIPUNCTURE: CPT | Mod: PO

## 2018-01-23 ENCOUNTER — HOSPITAL ENCOUNTER (OUTPATIENT)
Dept: RADIOLOGY | Facility: HOSPITAL | Age: 73
Discharge: HOME | End: 2018-01-23
Attending: INTERNAL MEDICINE
Payer: MEDICARE

## 2018-01-23 DIAGNOSIS — R92.8 ABNORMAL MAMMOGRAM: ICD-10-CM

## 2018-01-23 PROCEDURE — 77065 DX MAMMO INCL CAD UNI: CPT | Mod: TC,PO

## 2018-01-23 PROCEDURE — 77065 DX MAMMO INCL CAD UNI: CPT | Mod: 26,,, | Performed by: RADIOLOGY

## 2018-01-23 PROCEDURE — 77061 BREAST TOMOSYNTHESIS UNI: CPT | Mod: TC,PO

## 2018-01-23 PROCEDURE — 77061 BREAST TOMOSYNTHESIS UNI: CPT | Mod: 26,,, | Performed by: RADIOLOGY

## 2018-02-06 ENCOUNTER — OFFICE VISIT (OUTPATIENT)
Dept: CARDIOLOGY | Facility: CLINIC | Age: 73
End: 2018-02-06
Payer: MEDICARE

## 2018-02-06 VITALS
SYSTOLIC BLOOD PRESSURE: 103 MMHG | HEIGHT: 64 IN | OXYGEN SATURATION: 98 % | WEIGHT: 175.81 LBS | BODY MASS INDEX: 30.02 KG/M2 | DIASTOLIC BLOOD PRESSURE: 51 MMHG | HEART RATE: 82 BPM

## 2018-02-06 DIAGNOSIS — I10 ESSENTIAL HYPERTENSION: Primary | ICD-10-CM

## 2018-02-06 DIAGNOSIS — I47.29 NSVT (NONSUSTAINED VENTRICULAR TACHYCARDIA): ICD-10-CM

## 2018-02-06 DIAGNOSIS — E78.1 HYPERTRIGLYCERIDEMIA: ICD-10-CM

## 2018-02-06 DIAGNOSIS — R94.31 ABNORMAL ECG: ICD-10-CM

## 2018-02-06 DIAGNOSIS — I51.89 LEFT VENTRICULAR DIASTOLIC DYSFUNCTION WITH PRESERVED SYSTOLIC FUNCTION: ICD-10-CM

## 2018-02-06 PROCEDURE — 99999 PR PBB SHADOW E&M-EST. PATIENT-LVL III: CPT | Mod: PBBFAC,,, | Performed by: INTERNAL MEDICINE

## 2018-02-06 PROCEDURE — 1126F AMNT PAIN NOTED NONE PRSNT: CPT | Mod: S$GLB,,, | Performed by: INTERNAL MEDICINE

## 2018-02-06 PROCEDURE — 3008F BODY MASS INDEX DOCD: CPT | Mod: S$GLB,,, | Performed by: INTERNAL MEDICINE

## 2018-02-06 PROCEDURE — 99214 OFFICE O/P EST MOD 30 MIN: CPT | Mod: S$GLB,,, | Performed by: INTERNAL MEDICINE

## 2018-02-06 PROCEDURE — 99499 UNLISTED E&M SERVICE: CPT | Mod: S$GLB,,, | Performed by: INTERNAL MEDICINE

## 2018-02-06 PROCEDURE — 1159F MED LIST DOCD IN RCRD: CPT | Mod: S$GLB,,, | Performed by: INTERNAL MEDICINE

## 2018-02-06 NOTE — PROGRESS NOTES
Subjective:   Patient ID:  Misa Barajas is a 72 y.o. female who presents for follow-up of Follow-up      Problem List Items Addressed This Visit        Cardiac/Vascular    HTN (hypertension) - Primary    Left ventricular diastolic dysfunction with preserved systolic function    Abnormal ECG    Hypertriglyceridemia    NSVT (nonsustained ventricular tachycardia)          HPI: 70 y/o female with HTN, NSVT and DD is here for f/u. She is doing well with no complaints. She denies chest pain or dyspnea. No orthopnea or PND. BP is controlled. No dizziness or syncope. She is able to walk 2 blocks.   Stress test 9/2017 negative for ischemia.   Tolerating BP medications well except for some leg swelling worsened in the evenings.    Review of Systems   Constitution: Negative.   HENT: Negative.    Eyes: Negative.    Cardiovascular: Negative.    Respiratory: Negative.    Endocrine: Negative.    Hematologic/Lymphatic: Negative.    Skin: Negative.    Musculoskeletal: Negative.    Gastrointestinal: Negative.    Neurological: Negative.    Psychiatric/Behavioral: Negative.        Patient's Medications   New Prescriptions    No medications on file   Previous Medications    ACETAMINOPHEN (TYLENOL) 500 MG TABLET    Take 1 tablet (500 mg total) by mouth every 6 (six) hours as needed for Pain.    AMLODIPINE (NORVASC) 5 MG TABLET    TAKE ONE TABLET BY MOUTH ONCE DAILY AT  NIGHT    ASPIRIN (ECOTRIN) 81 MG EC TABLET    Take 81 mg by mouth once daily.    BIOTIN 1 MG TABLET    Take 1,000 mcg by mouth 3 (three) times daily.    CHOLECALCIFEROL, VITAMIN D3, 1,000 UNIT CAPSULE    Take 2 capsules (2,000 Units total) by mouth once daily.    FISH OIL-OMEGA-3 FATTY ACIDS 300-1,000 MG CAPSULE    Take 2 g by mouth once daily.    FLUTICASONE (FLONASE) 50 MCG/ACTUATION NASAL SPRAY    2 sprays once daily.  Use as directed    FLUZONE HIGH-DOSE 2017-18, PF, 180 MCG/0.5 ML VACCINE        GARLIC ORAL    Take by mouth.    LOSARTAN (COZAAR) 50 MG TABLET     TAKE ONE TABLET BY MOUTH ONCE DAILY    TAMSULOSIN (FLOMAX) 0.4 MG CP24    Take 1 capsule (0.4 mg total) by mouth once daily.    VITAMIN B COMPLEX NO.12-NIACIN ORAL    Take by mouth.   Modified Medications    No medications on file   Discontinued Medications    No medications on file       Objective:   Physical Exam   Constitutional: She is oriented to person, place, and time. She appears well-developed and well-nourished. No distress.   Examination of the digits showed no clubbing or cyanosis   HENT:   Head: Normocephalic and atraumatic.   Eyes: Conjunctivae are normal. Pupils are equal, round, and reactive to light. Right eye exhibits no discharge.   Neck: Normal range of motion. Neck supple. No JVD present. No thyromegaly present.   No carotid bruits   Cardiovascular: Normal rate, regular rhythm, S1 normal, S2 normal, normal heart sounds, intact distal pulses and normal pulses.  PMI is not displaced.  Exam reveals no gallop, no friction rub and no opening snap.    No murmur heard.  Pulmonary/Chest: Effort normal and breath sounds normal. No respiratory distress. She has no wheezes. She has no rales. She exhibits no tenderness.   Abdominal: Soft. Bowel sounds are normal. She exhibits no distension and no mass. There is no tenderness. There is no guarding.   No hepatosplenomegaly   Musculoskeletal: Normal range of motion. She exhibits no edema or tenderness.   Lymphadenopathy:     She has no cervical adenopathy.   Neurological: She is alert and oriented to person, place, and time.   Skin: Skin is warm. No rash noted. She is not diaphoretic. No erythema.   Psychiatric: She has a normal mood and affect.   Nursing note and vitals reviewed.      ECGs reviewed-NSR with RBBB with LAFB and 1st degree AV block  LABS reviewed  Imaging including Echoes reviewed    Assessment:     1. Essential hypertension    2. Left ventricular diastolic dysfunction with preserved systolic function    3. Abnormal ECG    4.  Hypertriglyceridemia    5. NSVT (nonsustained ventricular tachycardia)        Plan:     Leg elevation  Compression stockings.   Activity as tolerate  Low salt diet  Continue current medications.    F/u in 1 year

## 2018-02-12 ENCOUNTER — TELEPHONE (OUTPATIENT)
Dept: INTERNAL MEDICINE | Facility: CLINIC | Age: 73
End: 2018-02-12

## 2018-02-12 NOTE — TELEPHONE ENCOUNTER
----- Message from Davin Viera sent at 2/12/2018  8:05 AM CST -----  Contact: self/[phone]  Patient needs to reschedule her labs as she is ill but our office is closed tomorrow.  Please call and advise if she can do them the same day as her appointment this Wednesday.

## 2018-02-12 NOTE — TELEPHONE ENCOUNTER
Spoke with pt to reschedule lab appointment. Pt was also informed that there is a chance her appointment with Dr. Cortez might need to be reschedule. Pt voiced understanding.

## 2018-02-14 ENCOUNTER — LAB VISIT (OUTPATIENT)
Dept: LAB | Facility: HOSPITAL | Age: 73
End: 2018-02-14
Attending: INTERNAL MEDICINE
Payer: MEDICARE

## 2018-02-14 ENCOUNTER — OFFICE VISIT (OUTPATIENT)
Dept: INTERNAL MEDICINE | Facility: CLINIC | Age: 73
End: 2018-02-14
Payer: MEDICARE

## 2018-02-14 ENCOUNTER — TELEPHONE (OUTPATIENT)
Dept: NEPHROLOGY | Facility: CLINIC | Age: 73
End: 2018-02-14

## 2018-02-14 VITALS
HEART RATE: 78 BPM | DIASTOLIC BLOOD PRESSURE: 64 MMHG | BODY MASS INDEX: 30.26 KG/M2 | OXYGEN SATURATION: 96 % | SYSTOLIC BLOOD PRESSURE: 100 MMHG | HEIGHT: 64 IN | WEIGHT: 177.25 LBS

## 2018-02-14 DIAGNOSIS — Z12.2 ENCOUNTER FOR SCREENING FOR LUNG CANCER: ICD-10-CM

## 2018-02-14 DIAGNOSIS — I95.9 HYPOTENSION, UNSPECIFIED HYPOTENSION TYPE: Primary | ICD-10-CM

## 2018-02-14 DIAGNOSIS — Z13.220 ENCOUNTER FOR SCREENING FOR LIPID DISORDER: ICD-10-CM

## 2018-02-14 DIAGNOSIS — Z13.220 ENCOUNTER FOR LIPID SCREENING FOR CARDIOVASCULAR DISEASE: ICD-10-CM

## 2018-02-14 DIAGNOSIS — Z13.6 ENCOUNTER FOR LIPID SCREENING FOR CARDIOVASCULAR DISEASE: ICD-10-CM

## 2018-02-14 DIAGNOSIS — N18.30 CKD (CHRONIC KIDNEY DISEASE) STAGE 3, GFR 30-59 ML/MIN: ICD-10-CM

## 2018-02-14 PROBLEM — N18.4 CHRONIC KIDNEY DISEASE, STAGE IV (SEVERE): Status: RESOLVED | Noted: 2017-07-14 | Resolved: 2018-02-14

## 2018-02-14 LAB
CHOLEST SERPL-MCNC: 199 MG/DL
CHOLEST/HDLC SERPL: 3.4 {RATIO}
HDLC SERPL-MCNC: 59 MG/DL
HDLC SERPL: 29.6 %
LDLC SERPL CALC-MCNC: 119.4 MG/DL
NONHDLC SERPL-MCNC: 140 MG/DL
TRIGL SERPL-MCNC: 103 MG/DL

## 2018-02-14 PROCEDURE — 3008F BODY MASS INDEX DOCD: CPT | Mod: S$GLB,,, | Performed by: INTERNAL MEDICINE

## 2018-02-14 PROCEDURE — 80061 LIPID PANEL: CPT

## 2018-02-14 PROCEDURE — 1159F MED LIST DOCD IN RCRD: CPT | Mod: S$GLB,,, | Performed by: INTERNAL MEDICINE

## 2018-02-14 PROCEDURE — 36415 COLL VENOUS BLD VENIPUNCTURE: CPT | Mod: PO

## 2018-02-14 PROCEDURE — 99214 OFFICE O/P EST MOD 30 MIN: CPT | Mod: S$GLB,,, | Performed by: INTERNAL MEDICINE

## 2018-02-14 PROCEDURE — 1126F AMNT PAIN NOTED NONE PRSNT: CPT | Mod: S$GLB,,, | Performed by: INTERNAL MEDICINE

## 2018-02-14 PROCEDURE — 99999 PR PBB SHADOW E&M-EST. PATIENT-LVL III: CPT | Mod: PBBFAC,,, | Performed by: INTERNAL MEDICINE

## 2018-02-14 PROCEDURE — 99499 UNLISTED E&M SERVICE: CPT | Mod: S$GLB,,, | Performed by: INTERNAL MEDICINE

## 2018-02-14 NOTE — PATIENT INSTRUCTIONS
Recommendations for today    Blood pressure may be a lower than desired with current medications.  Blood pressure medications should include only losartan and amlodipine at this time.  Because we are concerned that blood pressure may be running too low we recommend that you suspend the medication amlodipine for now.    After stopping the medication amlodipine we recommend that you check blood pressure twice daily as instructed below for the next 7 days.    Top number ideally should be less than 130 but should not be consistently less than 110.  Please contact the clinic after 7 days of checking blood pressure giving us a summary of the overall pattern of the blood pressure numbers.  Based on this we can decide what to do with amlodipine.      Watching blood pressure  · Make sure you have a blood pressure machine at home.      · Make sure you read the instructions and use it appropriately.    · Please bring blood pressure machine to your appointments initially so we can review your numbers.     · Check blood pressure daily for 7 days     · The best time is first thing in the morning AND just prior to bedtime.    · You should sit down at a table for approximately 3-4 minutes quietly.  If you do not have time to do this then you do not have time to check blood pressure and you should wait to take the blood pressure at a different time..    · Keep a log of your blood pressure.  Write down the date and time and the blood pressure number.      · After checking blood pressure if you're not satisfied with the number you should not check on the same arm.  Instead you should check on the opposite arm.  If still not satisfied with the number should not check blood pressure again for approximately one hour.    · Comment next to any abnormally high blood pressure numbers if you can tell that there was a circumstance that led to this value (for example, taking blood pressure when you were upset, in pain or right after having a  cigarette or drinking coffee).     Avoid common mistakes when checking blood pressure  · Taking blood pressure over the top of clothing.  · Checking blood pressure repeatedly on the same arm (better to check on the other arm)  · Using blood pressure cuff that is too small (inflatable portion should cover at least 75% of your upper arm)    GOAL BLOOD PRESSURE:     Top number less than 130 and bottom number less than 80.    Please provide us with your blood pressure numbers

## 2018-02-17 NOTE — PROGRESS NOTES
Portions of this note are generated with voice recognition software. Typographical errors may exist.     SUBJECTIVE:    This is a/an 72 y.o. female here for primary care visit for  Chief Complaint   Patient presents with    Osteopenia     follow up      The patient has not been monitoring blood pressure closely at home recently.  Denies orthostatic symptoms.  She denies any diarrheal symptoms.  Patient confirms that she has not made adjustments to blood pressure regimen on her own.  She confirms that she is not taking Flomax chronically even though this has been on her medication list for many months.  States that she might of been prescribed Flomax after nephrolithiasis evaluation and treatment plan but that she never took this medicine knowingly.  She will confirm if she has medication at home but she doesn't think that she has been taking this.  Patient confirms blood pressure medications on her list.    Patient states that she misunderstood whether she was receiving nephrology follow-up or not.  Patient thinking that urologist was monitoring kidney function.  Patient understanding that she is overdue for nephrology follow-up.      Medications Reviewed and Updated    Past medical, family, and social histories were reviewed and updated.    Review of Systems negative unless otherwise noted in history of present illness-  ROS    General ROS: negative  Psychological ROS: negative  ENT ROS: negative  Endocrine ROS: Negative  Allergy and Immunology ROS: negative  Cardiovascular ROS: negative  Pulmonary ROS: Negative  Gastrointestinal ROS: negative  Genito-Urinary ROS: negative  Musculoskeletal ROS: negative  Neurological ROS: negative  Dermatological ROS: negative        Allergic:    Review of patient's allergies indicates:   Allergen Reactions    Adhesive Rash     Pt states she removed a LATEX bandaid and the skin beneath was swollen and red. No other latex causes a reaction.       OBJECTIVE:  BP: 100/64 Pulse: 78     Wt Readings from Last 3 Encounters:   02/14/18 80.4 kg (177 lb 4 oz)   02/06/18 79.7 kg (175 lb 12.8 oz)   09/19/17 78.8 kg (173 lb 11.6 oz)    Body mass index is 30.42 kg/m².  Previous Blood Pressure Readings :   BP Readings from Last 3 Encounters:   02/14/18 100/64   02/06/18 (!) 103/51   11/21/17 132/82       Physical Exam    GEN: No apparent distress  HEENT: sclera non-icteric, conjunctiva clear  CV: no peripheral edema  PULM: breathing non-labored  ABD: Obese, protuberant abdomen.  PSYCH: appropriate affect  MSK: able to rise from chair without assistance  SKIN: normal skin turgor    Pertinent Labs Reviewed       ASSESSMENT/PLAN:    Hypotension, unspecified hypotension type.This is a New problem. The etiology is Likely medication related.. The problem is not adequately controlled. The risk of medical complications is moderate. Treatment/diagnostic recommendations are to Recommend suspending amlodipine.  Detailed counseling on self monitoring.  Avoid hypotension to prevent kidney damage.. The patient advised if symptoms change or intensify to seek medical care.       CKD (chronic kidney disease) stage 3, GFR 30-59 ml/min.Further evaluation warranted.  Recommendations as below.  -     Renal function panel; Future; Expected date: 02/14/2018  -     PHOSPHORUS; Future; Expected date: 02/14/2018    Encounter for screening for lung cancer. .Further evaluation warranted.  Recommendations as below.  - repeat CT as indicated    Future Appointments  Date Time Provider Department Center   3/21/2018 2:30 PM DO REINALDO Mccarty  2/17/2018  9:16 AM

## 2018-02-22 ENCOUNTER — PATIENT MESSAGE (OUTPATIENT)
Dept: INTERNAL MEDICINE | Facility: CLINIC | Age: 73
End: 2018-02-22

## 2018-03-05 ENCOUNTER — OFFICE VISIT (OUTPATIENT)
Dept: INTERNAL MEDICINE | Facility: CLINIC | Age: 73
End: 2018-03-05
Payer: MEDICARE

## 2018-03-05 VITALS
BODY MASS INDEX: 29.99 KG/M2 | SYSTOLIC BLOOD PRESSURE: 128 MMHG | HEART RATE: 82 BPM | WEIGHT: 175.69 LBS | DIASTOLIC BLOOD PRESSURE: 72 MMHG | OXYGEN SATURATION: 96 % | HEIGHT: 64 IN

## 2018-03-05 DIAGNOSIS — I10 ESSENTIAL HYPERTENSION: Primary | ICD-10-CM

## 2018-03-05 DIAGNOSIS — N18.30 CKD (CHRONIC KIDNEY DISEASE) STAGE 3, GFR 30-59 ML/MIN: ICD-10-CM

## 2018-03-05 DIAGNOSIS — F43.0 STRESS REACTION: ICD-10-CM

## 2018-03-05 PROCEDURE — 3078F DIAST BP <80 MM HG: CPT | Mod: S$GLB,,, | Performed by: INTERNAL MEDICINE

## 2018-03-05 PROCEDURE — 3074F SYST BP LT 130 MM HG: CPT | Mod: S$GLB,,, | Performed by: INTERNAL MEDICINE

## 2018-03-05 PROCEDURE — 99499 UNLISTED E&M SERVICE: CPT | Mod: S$GLB,,, | Performed by: INTERNAL MEDICINE

## 2018-03-05 PROCEDURE — 99999 PR PBB SHADOW E&M-EST. PATIENT-LVL III: CPT | Mod: PBBFAC,,, | Performed by: INTERNAL MEDICINE

## 2018-03-05 PROCEDURE — 99214 OFFICE O/P EST MOD 30 MIN: CPT | Mod: S$GLB,,, | Performed by: INTERNAL MEDICINE

## 2018-03-05 RX ORDER — AMLODIPINE BESYLATE 5 MG/1
5 TABLET ORAL DAILY
Qty: 90 TABLET | Refills: 3 | Status: SHIPPED | OUTPATIENT
Start: 2018-03-05 | End: 2018-03-26 | Stop reason: SDUPTHER

## 2018-03-05 RX ORDER — AMLODIPINE BESYLATE 5 MG/1
2.5 TABLET ORAL DAILY
Qty: 45 TABLET | Refills: 3 | Status: SHIPPED | OUTPATIENT
Start: 2018-03-05 | End: 2018-03-05 | Stop reason: SDUPTHER

## 2018-03-05 NOTE — PROGRESS NOTES
Portions of this note are generated with voice recognition software. Typographical errors may exist.     SUBJECTIVE:    This is a/an 72 y.o. female here for primary care visit for  Chief Complaint   Patient presents with    Hypertension     follow up      Home blood pressure monitor evaluated today.  Numbers very close approximation to manual blood pressure evaluation.  Patient has been taking half tablet of amlodipine in addition to losartan.  Stress reaction recently because of worsening health of 2 dogs.      Thurs. 2/15 8:45am [id number], Dia82, Pul89,     10:30 pm [id number] Dia72 Pul80,     Friday 6:41am [id number],Dia76,Pul93,     9:00pm, Dse416, Dia80, Pul77,     Saturday 2/17 8:45 am lrv461, dia72,pul84, 9:25pm kbc809, dia79,pul85. Sunday 2/18 7:15am lpf294,dia74, pul88, 9:10pm sys 258, dia81,pul85. Monday 2/19 7:30 am jfz361, dia73, pul98, 10:45pm yem308,dia80, pul98, 10:35pm, miq151, dia80, pul84,  Tuesday 2/29 8:10am [id number] dia81, lgs972,10:05pm uxx544, marysol 82, pul85, Wednesday2/21 7:67hl173,dia82, pul89, 9:55pm orc770, dia78, pul80.     Answers for HPI/ROS submitted by the patient on 3/3/2018   Hypertension  Chronicity: new  Onset: more than 1 year ago  Progression since onset: waxing and waning  Condition status: resistant  anxiety: No  peripheral edema: Yes  sweats: No  Agents associated with hypertension: no associated agents  CAD risks: dyslipidemia  Compliance problems: no compliance problems  Past treatments: nothing  Improvement on treatment: mild      Medications Reviewed and Updated    Past medical, family, and social histories were reviewed and updated.    Review of Systems negative unless otherwise noted in history of present illness-  Review of Systems   Constitutional: Negative for malaise/fatigue.   Eyes: Negative for blurred vision.   Respiratory: Negative for shortness of breath.    Cardiovascular: Negative for chest pain, palpitations, orthopnea and PND.   Musculoskeletal: Negative for neck pain.    Neurological: Negative for headaches.         Allergic:    Review of patient's allergies indicates:   Allergen Reactions    Adhesive Rash     Pt states she removed a LATEX bandaid and the skin beneath was swollen and red. No other latex causes a reaction.       OBJECTIVE:  BP: 128/72 Pulse: 82    Wt Readings from Last 3 Encounters:   03/05/18 79.7 kg (175 lb 11.3 oz)   02/14/18 80.4 kg (177 lb 4 oz)   02/06/18 79.7 kg (175 lb 12.8 oz)    Body mass index is 30.16 kg/m².  Previous Blood Pressure Readings :   BP Readings from Last 3 Encounters:   03/05/18 128/72   02/14/18 100/64   02/06/18 (!) 103/51       Physical Exam    GEN: No apparent distress  HEENT: sclera non-icteric, conjunctiva clear  CV: no peripheral edema  PULM: breathing non-labored  ABD: Obese, protuberant abdomen.  PSYCH: appropriate affect  MSK: able to rise from chair without assistance  SKIN: normal skin turgor    Pertinent Labs Reviewed       ASSESSMENT/PLAN:    Essential hypertension  -     Discontinue: amLODIPine (NORVASC) 5 MG tablet; Take 0.5 tablets (2.5 mg total) by mouth once daily.  Dispense: 45 tablet; Refill: 3  -     amLODIPine (NORVASC) 5 MG tablet; Take 1 tablet (5 mg total) by mouth once daily.  Dispense: 90 tablet; Refill: 3    CKD (chronic kidney disease) stage 3, GFR 30-59 ml/min    Stress reaction          Future Appointments  Date Time Provider Department Center   3/21/2018 2:30 PM [facility]  3/5/2018  11:50 AM

## 2018-03-05 NOTE — PATIENT INSTRUCTIONS
Recommendations for today    Recent stress is impacting blood pressure numbers on a persistent basis so we recommend increasing the dosage of amlodipine.  Please take a full tablet once daily.    In the future if life circumstances improve full tablet of amlodipine may be too strong.  You will note this by checking blood pressure daily.  If the top number is less than 110 on a persistent basis she should contact the clinic and adjust the blood pressure tablet amlodipine and start taking half tablet once daily in addition to losartan.    For the time being if full tablet of amlodipine and taking losartan failed to maintain blood pressure less than 130/80 contact the clinic for additional recommendations.

## 2018-03-24 DIAGNOSIS — I10 ESSENTIAL HYPERTENSION: ICD-10-CM

## 2018-03-26 ENCOUNTER — PATIENT MESSAGE (OUTPATIENT)
Dept: INTERNAL MEDICINE | Facility: CLINIC | Age: 73
End: 2018-03-26

## 2018-03-26 DIAGNOSIS — I10 ESSENTIAL HYPERTENSION: ICD-10-CM

## 2018-03-26 RX ORDER — LOSARTAN POTASSIUM 50 MG/1
TABLET ORAL
Qty: 90 TABLET | Refills: 1 | Status: SHIPPED | OUTPATIENT
Start: 2018-03-26 | End: 2018-03-27 | Stop reason: SDUPTHER

## 2018-03-27 RX ORDER — LOSARTAN POTASSIUM 50 MG/1
50 TABLET ORAL DAILY
Qty: 90 TABLET | Refills: 1 | Status: SHIPPED | OUTPATIENT
Start: 2018-03-27 | End: 2019-03-27 | Stop reason: SDUPTHER

## 2018-03-27 RX ORDER — AMLODIPINE BESYLATE 5 MG/1
5 TABLET ORAL DAILY
Qty: 90 TABLET | Refills: 3 | Status: SHIPPED | OUTPATIENT
Start: 2018-03-27 | End: 2019-03-27 | Stop reason: SDUPTHER

## 2018-04-03 ENCOUNTER — PES CALL (OUTPATIENT)
Dept: ADMINISTRATIVE | Facility: CLINIC | Age: 73
End: 2018-04-03

## 2018-05-10 ENCOUNTER — PES CALL (OUTPATIENT)
Dept: ADMINISTRATIVE | Facility: CLINIC | Age: 73
End: 2018-05-10

## 2018-05-15 ENCOUNTER — PES CALL (OUTPATIENT)
Dept: ADMINISTRATIVE | Facility: CLINIC | Age: 73
End: 2018-05-15

## 2018-05-16 ENCOUNTER — PES CALL (OUTPATIENT)
Dept: ADMINISTRATIVE | Facility: CLINIC | Age: 73
End: 2018-05-16

## 2018-08-23 ENCOUNTER — PES CALL (OUTPATIENT)
Dept: ADMINISTRATIVE | Facility: CLINIC | Age: 73
End: 2018-08-23

## 2018-08-28 ENCOUNTER — PES CALL (OUTPATIENT)
Dept: ADMINISTRATIVE | Facility: CLINIC | Age: 73
End: 2018-08-28

## 2018-08-31 DIAGNOSIS — Z12.11 COLON CANCER SCREENING: ICD-10-CM

## 2018-10-02 ENCOUNTER — OFFICE VISIT (OUTPATIENT)
Dept: FAMILY MEDICINE | Facility: CLINIC | Age: 73
End: 2018-10-02
Payer: MEDICARE

## 2018-10-02 VITALS
BODY MASS INDEX: 28.91 KG/M2 | DIASTOLIC BLOOD PRESSURE: 50 MMHG | TEMPERATURE: 98 F | HEART RATE: 76 BPM | OXYGEN SATURATION: 97 % | HEIGHT: 64 IN | SYSTOLIC BLOOD PRESSURE: 118 MMHG | WEIGHT: 169.31 LBS

## 2018-10-02 DIAGNOSIS — E55.9 VITAMIN D DEFICIENCY: ICD-10-CM

## 2018-10-02 DIAGNOSIS — E78.1 HYPERTRIGLYCERIDEMIA: ICD-10-CM

## 2018-10-02 DIAGNOSIS — I10 ESSENTIAL HYPERTENSION: ICD-10-CM

## 2018-10-02 DIAGNOSIS — M47.819 ARTHRITIS OF FACET JOINTS AT MULTIPLE VERTEBRAL LEVELS: ICD-10-CM

## 2018-10-02 DIAGNOSIS — N18.30 CKD (CHRONIC KIDNEY DISEASE) STAGE 3, GFR 30-59 ML/MIN: ICD-10-CM

## 2018-10-02 DIAGNOSIS — Z00.00 ENCOUNTER FOR PREVENTIVE HEALTH EXAMINATION: Primary | ICD-10-CM

## 2018-10-02 DIAGNOSIS — G62.0 POLYNEUROPATHY FOLLOWING CHEMOTHERAPY: ICD-10-CM

## 2018-10-02 DIAGNOSIS — T45.1X5A POLYNEUROPATHY FOLLOWING CHEMOTHERAPY: ICD-10-CM

## 2018-10-02 DIAGNOSIS — N25.81 HYPERPARATHYROIDISM DUE TO RENAL INSUFFICIENCY: ICD-10-CM

## 2018-10-02 DIAGNOSIS — J84.10 CALCIFIED GRANULOMA OF LUNG: ICD-10-CM

## 2018-10-02 DIAGNOSIS — E66.3 OVERWEIGHT (BMI 25.0-29.9): ICD-10-CM

## 2018-10-02 DIAGNOSIS — I70.0 AORTIC ATHEROSCLEROSIS: ICD-10-CM

## 2018-10-02 PROCEDURE — 3074F SYST BP LT 130 MM HG: CPT | Mod: CPTII,S$GLB,, | Performed by: NURSE PRACTITIONER

## 2018-10-02 PROCEDURE — 99214 OFFICE O/P EST MOD 30 MIN: CPT | Mod: PBBFAC,PO | Performed by: NURSE PRACTITIONER

## 2018-10-02 PROCEDURE — G0439 PPPS, SUBSEQ VISIT: HCPCS | Mod: S$GLB,,, | Performed by: NURSE PRACTITIONER

## 2018-10-02 PROCEDURE — 99499 UNLISTED E&M SERVICE: CPT | Mod: HCNC,S$GLB,, | Performed by: NURSE PRACTITIONER

## 2018-10-02 PROCEDURE — 3078F DIAST BP <80 MM HG: CPT | Mod: CPTII,S$GLB,, | Performed by: NURSE PRACTITIONER

## 2018-10-02 PROCEDURE — 99999 PR PBB SHADOW E&M-EST. PATIENT-LVL IV: CPT | Mod: PBBFAC,,, | Performed by: NURSE PRACTITIONER

## 2018-10-02 NOTE — PATIENT INSTRUCTIONS
Counseling and Referral of Other Preventative  (Italic type indicates deductible and co-insurance are waived)    Patient Name: Misa Barajas  Today's Date: 10/16/2018    Health Maintenance       Date Due Completion Date    High Dose Statin 10/25/1966 ---    Influenza Vaccine 07/01/2018 9/14/2016    Override on 11/27/2015: Done    Mammogram 01/23/2019 1/23/2018    DEXA SCAN 03/21/2019 3/21/2016    Lipid Panel 02/14/2023 2/14/2018    Colonoscopy 11/27/2025 11/27/2015 (Declined)    Override on 11/27/2015: Declined    TETANUS VACCINE 08/08/2026 8/8/2016        No orders of the defined types were placed in this encounter.    The following information is provided to all patients.  This information is to help you find resources for any of the problems found today that may be affecting your health:                Living healthy guide: www.[facility].[place]      Understanding Diabetes: www.diabetes.org      Eating healthy: www.cdc.gov/healthyweight      [facility] [place] home safety checklist: www.cdc.gov/steadi/patient.html      Agency on Aging: www.goea.[place]      Alcoholics anonymous (AA): www.aa.org      Physical Activity: www.ayesha.nih.gov/rh6tgfm      Tobacco use: www.quitwithusla.org

## 2018-10-16 PROBLEM — J98.4 CALCIFIED GRANULOMA OF LUNG: Status: ACTIVE | Noted: 2017-05-30

## 2018-10-16 PROBLEM — J84.10 CALCIFIED GRANULOMA OF LUNG: Status: ACTIVE | Noted: 2017-05-30

## 2018-10-16 NOTE — PROGRESS NOTES
"Misa Barajas presented for a  Medicare AWV and comprehensive Health Risk Assessment today. The following components were reviewed and updated:    · Medical history  · Family History  · Social history  · Allergies and Current Medications  · Health Risk Assessment  · Health Maintenance  · Care Team     ** See Completed Assessments for Annual Wellness Visit within the encounter summary.**       The following assessments were completed:  · Living Situation  · CAGE  · Depression Screening  · Timed Get Up and Go  · Whisper Test  · Cognitive Function Screening  · Nutrition Screening  · ADL Screening  · PAQ Screening    Vitals:    10/02/18 0941   BP: (!) 118/50   BP Location: Right arm   Patient Position: Sitting   BP Method: Medium (Manual)   Pulse: 76   Temp: 98.1 °F (36.7 °C)   TempSrc: Oral   SpO2: 97%   Weight: 76.8 kg (169 lb 5 oz)   Height: 5' 4" (1.626 m)     Body mass index is 29.06 kg/m².     Physical Exam   Constitutional: She is oriented to person, place, and time. She appears well-developed and well-nourished. No distress.   HENT:   Head: Normocephalic and atraumatic.   Eyes: EOM are normal. Pupils are equal, round, and reactive to light.   Neck: Neck supple. No JVD present. No tracheal deviation present.   Cardiovascular: Normal rate, regular rhythm, normal heart sounds and intact distal pulses.   No murmur heard.  Pulmonary/Chest: Effort normal and breath sounds normal. No respiratory distress. She has no wheezes. She has no rales.   Abdominal: Soft. Bowel sounds are normal. She exhibits no distension and no mass. There is no tenderness.   Musculoskeletal: Normal range of motion. She exhibits no edema or tenderness.   Neurological: She is alert and oriented to person, place, and time. Coordination normal.   Skin: Skin is warm and dry. No erythema. No pallor.   Psychiatric: She has a normal mood and affect. Her behavior is normal. Judgment and thought content normal. Cognition and memory are normal. She " expresses no homicidal and no suicidal ideation.   Nursing note and vitals reviewed.        Diagnoses and health risks identified today and associated recommendations/orders:    1. Encounter for preventive health examination    2. Essential hypertension  Chronic; stable on medication.  Followed by PCP.    3. Hypertriglyceridemia  Chronic; stable on medication.  Followed by PCP.    4. Aortic atherosclerosis  Chronic; stable.  Followed by Cardiology.    5. Calcified granuloma of lung  Chronic; stable.  As seen on imaging dated 05/30/17.  Followed by PCP.    6. CKD (chronic kidney disease) stage 3, GFR 30-59 ml/min  Chronic; stable.  Followed by Nephrology.    7. Hyperparathyroidism due to renal insufficiency  Chronic; stable.  Followed by PCP.    8. Vitamin D deficiency  Chronic; stable on medication.  Followed by PCP.    9. Polyneuropathy following chemotherapy  Chronic; stable.  Followed by PCP.    10. Arthritis of facet joints at multiple vertebral levels  Chronic; stable on medication.  Followed by PCP.    11. Overweight (BMI 25.0-29.9)      Provided Misa with a 5-10 year written screening schedule and personal prevention plan. Recommendations were developed using the USPSTF age appropriate recommendations. Education, counseling, and referrals were provided as needed. After Visit Summary printed and given to patient which includes a list of additional screenings\tests needed.    Follow-up in about 3 months (around 1/2/2019) for follow-up with PCP, Annual Wellness Visit in 1 year.    Missy Waldrop NP

## 2018-11-27 ENCOUNTER — OFFICE VISIT (OUTPATIENT)
Dept: OPTOMETRY | Facility: CLINIC | Age: 73
End: 2018-11-27
Payer: MEDICARE

## 2018-11-27 DIAGNOSIS — H52.7 REFRACTIVE ERROR: ICD-10-CM

## 2018-11-27 DIAGNOSIS — H25.10 NUCLEAR SCLEROSIS, UNSPECIFIED LATERALITY: Primary | ICD-10-CM

## 2018-11-27 DIAGNOSIS — H40.053 BILATERAL OCULAR HYPERTENSION: ICD-10-CM

## 2018-11-27 PROCEDURE — 92020 GONIOSCOPY: CPT | Mod: S$GLB,,, | Performed by: OPTOMETRIST

## 2018-11-27 PROCEDURE — 99999 PR PBB SHADOW E&M-EST. PATIENT-LVL II: CPT | Mod: PBBFAC,,, | Performed by: OPTOMETRIST

## 2018-11-27 PROCEDURE — 92014 COMPRE OPH EXAM EST PT 1/>: CPT | Mod: S$GLB,,, | Performed by: OPTOMETRIST

## 2018-11-27 NOTE — PROGRESS NOTES
HPI     RAYMUNDO  Allergy and dry eye symptoms  Using tears with min relief    Last edited by Sunny Holt, OD on 11/27/2018 10:56 AM. (History)              Assessment /Plan     For exam results, see Encounter Report.    Nuclear sclerosis, unspecified laterality    Bilateral ocular hypertension  -     OCT - Optic Nerve; Future  -     Ward Visual Field - OU - Intermediate - Both Eyes; Future    Refractive error      1. Educated pt on presence of cataracts and effects on vision. No surgery at this time. Recheck in one year.  2. First episode, gonio open, pachy thick, no fam history of glaucoma, RTC for iop ck, HVF(24-2)flex std and OCT of rnfl.  3. Spec Rx given. Different lens options discussed with patient. RTC 1 year full exam.

## 2018-11-27 NOTE — MEDICAL/APP STUDENT
Pt reports allergy and dry eye symptoms. Onset in August. Has tearing, itchiness and redness. Using artificial tears OU prn.   RAYMUNDO: 11/2017, current glasses 1 year old, PALs

## 2019-01-28 ENCOUNTER — CLINICAL SUPPORT (OUTPATIENT)
Dept: OPHTHALMOLOGY | Facility: CLINIC | Age: 74
End: 2019-01-28
Payer: MEDICARE

## 2019-01-28 ENCOUNTER — OFFICE VISIT (OUTPATIENT)
Dept: OPTOMETRY | Facility: CLINIC | Age: 74
End: 2019-01-28
Payer: MEDICARE

## 2019-01-28 DIAGNOSIS — H40.053 BILATERAL OCULAR HYPERTENSION: ICD-10-CM

## 2019-01-28 DIAGNOSIS — H40.053 BILATERAL OCULAR HYPERTENSION: Primary | ICD-10-CM

## 2019-01-28 PROCEDURE — 92082 INTERMEDIATE VISUAL FIELD XM: CPT | Mod: S$GLB,,, | Performed by: OPTOMETRIST

## 2019-01-28 PROCEDURE — 92082 HUMPHREY VISUAL FIELD-OU-INTERMEDIATE-BOTH EYES: ICD-10-PCS | Mod: S$GLB,,, | Performed by: OPTOMETRIST

## 2019-01-28 PROCEDURE — 92012 INTRM OPH EXAM EST PATIENT: CPT | Mod: S$GLB,,, | Performed by: OPTOMETRIST

## 2019-01-28 PROCEDURE — 92133 POSTERIOR SEGMENT OCT OPTIC NERVE(OCULAR COHERENCE TOMOGRAPHY) - OU - BOTH EYES: ICD-10-PCS | Mod: S$GLB,,, | Performed by: OPTOMETRIST

## 2019-01-28 PROCEDURE — 99999 PR PBB SHADOW E&M-EST. PATIENT-LVL II: ICD-10-PCS | Mod: PBBFAC,,, | Performed by: OPTOMETRIST

## 2019-01-28 PROCEDURE — 92133 CPTRZD OPH DX IMG PST SGM ON: CPT | Mod: S$GLB,,, | Performed by: OPTOMETRIST

## 2019-01-28 PROCEDURE — 92012 PR EYE EXAM, EST PATIENT,INTERMED: ICD-10-PCS | Mod: S$GLB,,, | Performed by: OPTOMETRIST

## 2019-01-28 PROCEDURE — 99999 PR PBB SHADOW E&M-EST. PATIENT-LVL II: CPT | Mod: PBBFAC,,, | Performed by: OPTOMETRIST

## 2019-01-28 NOTE — PROGRESS NOTES
HPI     Pt here to recheck IOP, Vf and OCT test  Will treat for pressure based on tests and eye pressure    Last edited by Sunny Holt, OD on 1/28/2019  2:09 PM. (History)            Assessment /Plan     For exam results, see Encounter Report.    Bilateral ocular hypertension      1. IOP lower today, OCT normal, HVF with pos fix losses and high sensitivity, gonio open, pachy thick, no fam history of glaucoma, RTC 4 mos for iop ck.

## 2019-02-28 DIAGNOSIS — Z85.3 HISTORY OF BREAST CANCER: Primary | ICD-10-CM

## 2019-03-01 ENCOUNTER — TELEPHONE (OUTPATIENT)
Dept: HEMATOLOGY/ONCOLOGY | Facility: CLINIC | Age: 74
End: 2019-03-01

## 2019-03-01 NOTE — TELEPHONE ENCOUNTER
Tried calling pt to schedule mammo and f/u appt with Dr. Jordan. No answer or voicemail setup. Will try again later.

## 2019-03-06 ENCOUNTER — HOSPITAL ENCOUNTER (OUTPATIENT)
Dept: RADIOLOGY | Facility: HOSPITAL | Age: 74
Discharge: HOME | End: 2019-03-06
Attending: INTERNAL MEDICINE
Payer: MEDICARE

## 2019-03-06 ENCOUNTER — TELEPHONE (OUTPATIENT)
Dept: RADIOLOGY | Facility: HOSPITAL | Age: 74
End: 2019-03-06

## 2019-03-06 VITALS — BODY MASS INDEX: 28.85 KG/M2 | WEIGHT: 169 LBS | HEIGHT: 64 IN

## 2019-03-06 DIAGNOSIS — Z85.3 HISTORY OF BREAST CANCER: ICD-10-CM

## 2019-03-06 PROCEDURE — 77065 DX MAMMO INCL CAD UNI: CPT | Mod: TC,HCNC,PO

## 2019-03-06 PROCEDURE — 77061 BREAST TOMOSYNTHESIS UNI: CPT | Mod: 26,HCNC,, | Performed by: RADIOLOGY

## 2019-03-06 PROCEDURE — 77065 MAMMO DIGITAL DIAGNOSTIC RIGHT WITH TOMOSYNTHESIS_CAD: ICD-10-PCS | Mod: 26,HCNC,, | Performed by: RADIOLOGY

## 2019-03-06 PROCEDURE — 77065 DX MAMMO INCL CAD UNI: CPT | Mod: 26,HCNC,, | Performed by: RADIOLOGY

## 2019-03-06 PROCEDURE — 77061 MAMMO DIGITAL DIAGNOSTIC RIGHT WITH TOMOSYNTHESIS_CAD: ICD-10-PCS | Mod: 26,HCNC,, | Performed by: RADIOLOGY

## 2019-03-06 NOTE — TELEPHONE ENCOUNTER
Spoke with patient. Reviewed breast biopsy procedure and reviewed instructions for breast biopsy. Patient expressed understanding and all questions were answered. Provided patient with my phone number to call for any further concerns or questions.   Patient scheduled breast biopsy at the [facility] on 3/19/2019.

## 2019-03-20 ENCOUNTER — TELEPHONE (OUTPATIENT)
Dept: INTERNAL MEDICINE | Facility: CLINIC | Age: 74
End: 2019-03-20

## 2019-03-21 ENCOUNTER — TELEPHONE (OUTPATIENT)
Dept: HEMATOLOGY/ONCOLOGY | Facility: CLINIC | Age: 74
End: 2019-03-21

## 2019-03-21 ENCOUNTER — DOCUMENTATION ONLY (OUTPATIENT)
Dept: SURGERY | Facility: CLINIC | Age: 74
End: 2019-03-21

## 2019-03-21 NOTE — TELEPHONE ENCOUNTER
----- Message from Carleen Jordan MD sent at 3/20/2019  7:38 PM CDT -----  Called patient with Pathology results  No answer  Left message

## 2019-03-21 NOTE — PROGRESS NOTES
Attempted to phone patient with results of core breast biopsy, FA, message left for her to call Dr Jordan as she also tried to call patient today as well.

## 2019-03-22 ENCOUNTER — DOCUMENTATION ONLY (OUTPATIENT)
Dept: SURGERY | Facility: CLINIC | Age: 74
End: 2019-03-22

## 2019-03-22 NOTE — PROGRESS NOTES
Patient phoned,  No answer at home number, when called cell another person answered and said the patient had spoken with Dr Jordan this morning about results of core breast biopsy, benign FA

## 2019-03-27 ENCOUNTER — OFFICE VISIT (OUTPATIENT)
Dept: INTERNAL MEDICINE | Facility: CLINIC | Age: 74
End: 2019-03-27
Payer: MEDICARE

## 2019-03-27 ENCOUNTER — LAB VISIT (OUTPATIENT)
Dept: LAB | Facility: HOSPITAL | Age: 74
End: 2019-03-27
Attending: INTERNAL MEDICINE
Payer: MEDICARE

## 2019-03-27 VITALS
HEIGHT: 64 IN | OXYGEN SATURATION: 95 % | BODY MASS INDEX: 30.93 KG/M2 | SYSTOLIC BLOOD PRESSURE: 118 MMHG | DIASTOLIC BLOOD PRESSURE: 62 MMHG | HEART RATE: 82 BPM | WEIGHT: 181.19 LBS

## 2019-03-27 DIAGNOSIS — N18.30 CKD (CHRONIC KIDNEY DISEASE) STAGE 3, GFR 30-59 ML/MIN: ICD-10-CM

## 2019-03-27 DIAGNOSIS — I70.0 AORTIC ATHEROSCLEROSIS: ICD-10-CM

## 2019-03-27 DIAGNOSIS — M47.819 ARTHRITIS OF FACET JOINTS AT MULTIPLE VERTEBRAL LEVELS: ICD-10-CM

## 2019-03-27 DIAGNOSIS — Z12.31 BREAST CANCER SCREENING BY MAMMOGRAM: ICD-10-CM

## 2019-03-27 DIAGNOSIS — R60.0 PEDAL EDEMA: ICD-10-CM

## 2019-03-27 DIAGNOSIS — N25.81 HYPERPARATHYROIDISM DUE TO RENAL INSUFFICIENCY: ICD-10-CM

## 2019-03-27 DIAGNOSIS — Z00.00 ANNUAL PHYSICAL EXAM: Primary | ICD-10-CM

## 2019-03-27 DIAGNOSIS — Z00.00 ANNUAL PHYSICAL EXAM: ICD-10-CM

## 2019-03-27 DIAGNOSIS — I10 ESSENTIAL HYPERTENSION: ICD-10-CM

## 2019-03-27 DIAGNOSIS — Z12.11 ENCOUNTER FOR FECAL IMMUNOCHEMICAL TEST SCREENING: ICD-10-CM

## 2019-03-27 PROBLEM — I47.29 NSVT (NONSUSTAINED VENTRICULAR TACHYCARDIA): Status: RESOLVED | Noted: 2017-08-17 | Resolved: 2019-03-27

## 2019-03-27 LAB
25(OH)D3+25(OH)D2 SERPL-MCNC: 45 NG/ML (ref 30–96)
ALBUMIN SERPL BCP-MCNC: 3.6 G/DL (ref 3.5–5.2)
ALP SERPL-CCNC: 93 U/L (ref 55–135)
ALT SERPL W/O P-5'-P-CCNC: 24 U/L (ref 10–44)
ANION GAP SERPL CALC-SCNC: 8 MMOL/L (ref 8–16)
AST SERPL-CCNC: 27 U/L (ref 10–40)
BILIRUB SERPL-MCNC: 0.6 MG/DL (ref 0.1–1)
BNP SERPL-MCNC: 133 PG/ML (ref 0–99)
BUN SERPL-MCNC: 29 MG/DL (ref 8–23)
CALCIUM SERPL-MCNC: 9.9 MG/DL (ref 8.7–10.5)
CHLORIDE SERPL-SCNC: 109 MMOL/L (ref 95–110)
CHOLEST SERPL-MCNC: 219 MG/DL (ref 120–199)
CHOLEST/HDLC SERPL: 3.8 {RATIO} (ref 2–5)
CO2 SERPL-SCNC: 26 MMOL/L (ref 23–29)
CREAT SERPL-MCNC: 1.4 MG/DL (ref 0.5–1.4)
EST. GFR  (AFRICAN AMERICAN): 42.4 ML/MIN/1.73 M^2
EST. GFR  (NON AFRICAN AMERICAN): 36.8 ML/MIN/1.73 M^2
GLUCOSE SERPL-MCNC: 93 MG/DL (ref 70–110)
HDLC SERPL-MCNC: 58 MG/DL (ref 40–75)
HDLC SERPL: 26.5 % (ref 20–50)
LDLC SERPL CALC-MCNC: 127.4 MG/DL (ref 63–159)
NONHDLC SERPL-MCNC: 161 MG/DL
POTASSIUM SERPL-SCNC: 4.6 MMOL/L (ref 3.5–5.1)
PROT SERPL-MCNC: 6.9 G/DL (ref 6–8.4)
SODIUM SERPL-SCNC: 143 MMOL/L (ref 136–145)
TRIGL SERPL-MCNC: 168 MG/DL (ref 30–150)
TSH SERPL DL<=0.005 MIU/L-ACNC: 3.64 UIU/ML (ref 0.4–4)

## 2019-03-27 PROCEDURE — 3074F SYST BP LT 130 MM HG: CPT | Mod: HCNC,CPTII,S$GLB, | Performed by: INTERNAL MEDICINE

## 2019-03-27 PROCEDURE — 99499 UNLISTED E&M SERVICE: CPT | Mod: HCNC,S$GLB,, | Performed by: INTERNAL MEDICINE

## 2019-03-27 PROCEDURE — 3078F DIAST BP <80 MM HG: CPT | Mod: HCNC,CPTII,S$GLB, | Performed by: INTERNAL MEDICINE

## 2019-03-27 PROCEDURE — 99499 RISK ADDL DX/OHS AUDIT: ICD-10-PCS | Mod: HCNC,S$GLB,, | Performed by: INTERNAL MEDICINE

## 2019-03-27 PROCEDURE — 3074F PR MOST RECENT SYSTOLIC BLOOD PRESSURE < 130 MM HG: ICD-10-PCS | Mod: HCNC,CPTII,S$GLB, | Performed by: INTERNAL MEDICINE

## 2019-03-27 PROCEDURE — 80053 COMPREHEN METABOLIC PANEL: CPT | Mod: HCNC

## 2019-03-27 PROCEDURE — 36415 COLL VENOUS BLD VENIPUNCTURE: CPT | Mod: HCNC,PO

## 2019-03-27 PROCEDURE — 82306 VITAMIN D 25 HYDROXY: CPT | Mod: HCNC

## 2019-03-27 PROCEDURE — 99999 PR PBB SHADOW E&M-EST. PATIENT-LVL IV: CPT | Mod: PBBFAC,HCNC,, | Performed by: INTERNAL MEDICINE

## 2019-03-27 PROCEDURE — 3078F PR MOST RECENT DIASTOLIC BLOOD PRESSURE < 80 MM HG: ICD-10-PCS | Mod: HCNC,CPTII,S$GLB, | Performed by: INTERNAL MEDICINE

## 2019-03-27 PROCEDURE — 99397 PR PREVENTIVE VISIT,EST,65 & OVER: ICD-10-PCS | Mod: HCNC,S$GLB,, | Performed by: INTERNAL MEDICINE

## 2019-03-27 PROCEDURE — 99397 PER PM REEVAL EST PAT 65+ YR: CPT | Mod: HCNC,S$GLB,, | Performed by: INTERNAL MEDICINE

## 2019-03-27 PROCEDURE — 83880 ASSAY OF NATRIURETIC PEPTIDE: CPT | Mod: HCNC

## 2019-03-27 PROCEDURE — 80061 LIPID PANEL: CPT | Mod: HCNC

## 2019-03-27 PROCEDURE — 84443 ASSAY THYROID STIM HORMONE: CPT | Mod: HCNC

## 2019-03-27 PROCEDURE — 99999 PR PBB SHADOW E&M-EST. PATIENT-LVL IV: ICD-10-PCS | Mod: PBBFAC,HCNC,, | Performed by: INTERNAL MEDICINE

## 2019-03-27 PROCEDURE — 82274 ASSAY TEST FOR BLOOD FECAL: CPT | Mod: HCNC

## 2019-03-27 RX ORDER — LOSARTAN POTASSIUM 50 MG/1
50 TABLET ORAL DAILY
Qty: 90 TABLET | Refills: 1 | Status: SHIPPED | OUTPATIENT
Start: 2019-03-27 | End: 2019-09-22 | Stop reason: SDUPTHER

## 2019-03-27 RX ORDER — AMLODIPINE BESYLATE 5 MG/1
5 TABLET ORAL DAILY
Qty: 90 TABLET | Refills: 1 | Status: SHIPPED | OUTPATIENT
Start: 2019-03-27 | End: 2019-09-22 | Stop reason: SDUPTHER

## 2019-03-27 RX ORDER — ROSUVASTATIN CALCIUM 5 MG/1
5 TABLET, COATED ORAL DAILY
Qty: 90 TABLET | Refills: 1 | Status: SHIPPED | OUTPATIENT
Start: 2019-03-27 | End: 2019-09-22 | Stop reason: SDUPTHER

## 2019-03-27 NOTE — PATIENT INSTRUCTIONS
Recommendations for today    We strongly recommend that you contact the digital hypertension program to get started.  [facility] at [phone]    We recommend that you stop garlic and niacin supplements.  Do not start any new supplements without 1st contacting my office to do an interaction check.      .  Watching blood pressure  · Make sure you have a blood pressure machine at home.      · Make sure you read the instructions and use it appropriately.    · Please bring blood pressure machine to your appointments initially so we can review your numbers. Your doctor may ask you to keep a log of your numbers instead.    · Check blood pressure daily    · The best time is first thing in the morning AND just prior to bedtime.    · You should sit down at a table for approximately 3-4 minutes quietly.  If you do not have time to do this then you do not have time to check blood pressure and you should wait to take the blood pressure at a different time..    · Keep a log of your blood pressure.  Write down the date and time and the blood pressure number.      · After checking blood pressure if you're not satisfied with the number you should not check on the same arm.  Instead you should check on the opposite arm.  If still not satisfied with the number should not check blood pressure again for approximately one hour.    · Comment next to any abnormally high blood pressure numbers if you can tell that there was a circumstance that led to this value (for example, taking blood pressure when you were upset, in pain or right after having a cigarette or drinking coffee).     Avoid common mistakes when checking blood pressure  · Taking blood pressure over the top of clothing.  · Checking blood pressure repeatedly on the same arm (better to check on the other arm)  · Using blood pressure cuff that is too small (inflatable portion should cover at least 75% of your upper arm)    GOAL BLOOD PRESSURE:      Top number less than 130 and bottom number less than 80.    Please provide us with your blood pressure numbers        Rosuvastatin Tablets  What is this medicine?  ROSUVASTATIN (berenicefaisal oglesby sta tin) is known as a HMG-CoA reductase inhibitor or 'statin'. It lowers cholesterol and triglycerides in the blood. This drug may also reduce the risk of heart attack, stroke, or other health problems in patients with risk factors for heart disease. Diet and lifestyle changes are often used with this drug.  How should I use this medicine?  Take this medicine by mouth with a glass of water. Follow the directions on the prescription label. Do not cut, crush or chew this medicine. You can take this medicine with or without food. Take your doses at regular intervals. Do not take your medicine more often than directed.  Talk to your pediatrician regarding the use of this medicine in children. While this drug may be prescribed for children as young as 7 years old for selected conditions, precautions do apply.  What side effects may I notice from receiving this medicine?  Such side effects are very rare.  Should they happen stop using the medicine so the side effects will go away.  Should it be necessary to do this contact my office for additional recommendations  · allergic reactions like skin rash, itching or hives, swelling of the face, lips, or tongue  · dark urine  · fever  · joint pain  · muscle cramps, pain  · redness, blistering, peeling or loosening of the skin, including inside the mouth  · trouble passing urine or change in the amount of urine  · unusually weak or tired  · yellowing of the eyes or skin  Side effects that usually do not require medical attention (report to your doctor or health care professional if they continue or are bothersome):  · constipation  · heartburn  · nausea  · stomach gas, pain, upset  What may interact with this medicine?  Do not take this medicine with any of the following  medications:  · herbal medicines like red yeast rice  This medicine may also interact with the following medications:  · alcohol  · antacids containing aluminum hydroxide or magnesium hydroxide  · cyclosporine  · other medicines for high cholesterol  · some medicines for HIV infection  · warfarin  What if I miss a dose?  If you miss a dose, take it as soon as you can. Do not take 2 doses within 12 hours of each other. If there are less than 12 hours until your next dose, take only that dose. Do not take double or extra doses.  Where should I keep my medicine?  Keep out of the reach of children.  Store at room temperature between 20 and 25 degrees C (68 and 77 degrees F). Keep container tightly closed (protect from moisture). Throw away any unused medicine after the expiration date.  What should I tell my health care provider before I take this medicine?  They need to know if you have any of these conditions:  · frequently drink alcoholic beverages  · kidney disease  · liver disease  · muscle aches or weakness  · other medical condition  · an unusual or allergic reaction to rosuvastatin, other medicines, foods, dyes, or preservatives  · pregnant or trying to get pregnant  · breast-feeding  What should I watch for while using this medicine?  Visit your doctor or health care professional for regular check-ups. You may need regular tests to make sure your liver is working properly.  Tell your doctor or health care professional right away if you get any unexplained muscle pain, tenderness, or weakness, especially if you also have a fever and tiredness. Your doctor or health care professional may tell you to stop taking this medicine if you develop muscle problems. If your muscle problems do not go away after stopping this medicine, contact your health care professional.  This medicine may affect blood sugar levels. If you have diabetes, check with your doctor or health care professional before you change your diet or the  dose of your diabetic medicine.  Avoid taking antacids containing aluminum, calcium or magnesium within 2 hours of taking this medicine.  This drug is only part of a total heart-health program. Your doctor or a dietician can suggest a low-cholesterol and low-fat diet to help. Avoid alcohol and smoking, and keep a proper exercise schedule.  Do not use this drug if you are pregnant or breast-feeding. Serious side effects to an unborn child or to an infant are possible. Talk to your doctor or pharmacist for more information.  NOTE:This sheet is a summary. It may not cover all possible information. If you have questions about this medicine, talk to your doctor, pharmacist, or health care provider. Copyright© 2017 [employer]

## 2019-03-27 NOTE — PROGRESS NOTES
Portions of this note are generated with voice recognition software. Typographical errors may exist.     SUBJECTIVE:    This is a/an 73 y.o. female here for primary care visit for  Chief Complaint   Patient presents with    Annual Exam     Patient states that discrepant pedal edema has been a chronic issue ever since her orthopedic surgery affecting the left knee.  Patient states that the edema isn't worsening.  She Has remote history of venous stasis ulceration in the left ankle which has not had recurrence.    Patient states that she has been complying with amlodipine and losartan but not checking blood pressure consistently.  Patient states that she has had a difficult year in terms of deaths in the family.  This has resulted in poor self-care.    The patient understands recommendations to start statin medication based on her cardiovascular risk factors of hypertension atherosclerosis and chronic kidney disease. She has never been on statin medicine.  States that she does not have any problems with alcohol misuse or abuse.  Does not have problems with recurring myalgias.    Patient states that she is taking vitamin-D supplementation.    Patient states that she is not having significant problems with back pain. No NSAID misuse or abuse.  Only using Tylenol products.    Medications Reviewed and Updated    Past medical, family, and social histories were reviewed and updated.    Review of Systems negative unless otherwise noted in history of present illness-  ROS    General ROS: negative  Psychological ROS: negative  ENT ROS: negative  Endocrine ROS: Negative  Allergy and Immunology ROS: negative  Cardiovascular ROS: negative  Pulmonary ROS: Negative  Gastrointestinal ROS: negative  Genito-Urinary ROS: negative  Musculoskeletal ROS: negative  Neurological ROS: negative  Dermatological ROS: negative        Allergic:    Review of patient's allergies indicates:   Allergen Reactions    Adhesive Rash     Pt states she  removed a LATEX bandaid and the skin beneath was swollen and red. No other latex causes a reaction.       OBJECTIVE:  BP: 118/62 Pulse: 82    Wt Readings from Last 3 Encounters:   03/27/19 82.2 kg (181 lb 3.5 oz)   03/06/19 76.7 kg (169 lb)   10/02/18 76.8 kg (169 lb 5 oz)    Body mass index is 31.11 kg/m².  Previous Blood Pressure Readings :   BP Readings from Last 3 Encounters:   03/27/19 118/62   10/02/18 (!) 118/50   03/05/18 128/72       Physical Exam    GEN: No apparent distress  HEENT: sclera non-icteric, conjunctiva clear  CV:  Stable left lower extremity edema worse than right lower extremity.  Regular rate and rhythm. No murmurs.  No carotid bruits.  PULM: breathing non-labored  ABD: Obese, protuberant abdomen.  PSYCH: appropriate affect  MSK: able to rise from chair without assistance  SKIN: normal skin turgor    Pertinent Labs Reviewed       ASSESSMENT/PLAN:    Annual physical exam  -     Mammo Digital Screening Bilat; Standing  -     Comprehensive metabolic panel; Standing  -     Lipid panel; Standing  -     Vitamin D; Standing    Essential hypertension  -     amLODIPine (NORVASC) 5 MG tablet; Take 1 tablet (5 mg total) by mouth once daily.  Dispense: 90 tablet; Refill: 1  -     losartan (COZAAR) 50 MG tablet; Take 1 tablet (50 mg total) by mouth once daily.  Dispense: 90 tablet; Refill: 1  -     rosuvastatin (CRESTOR) 5 MG tablet; Take 1 tablet (5 mg total) by mouth once daily.  Dispense: 90 tablet; Refill: 1  -     Comprehensive metabolic panel; Standing  -     Lipid panel; Standing  -     Hypertension Digital Medicine (HDMP) Enrollment Order  -     Hypertension Digital Medicine (HDMP): Assign Onboarding Questionnaires    Hyperparathyroidism due to renal insufficiency  -     Comprehensive metabolic panel; Standing  -     Vitamin D; Standing  -     TSH; Future; Expected date: 03/27/2019    Arthritis of facet joints at multiple vertebral levels    Aortic atherosclerosis  -     rosuvastatin (CRESTOR) 5  MG tablet; Take 1 tablet (5 mg total) by mouth once daily.  Dispense: 90 tablet; Refill: 1  -     TSH; Future; Expected date: 03/27/2019    CKD (chronic kidney disease) stage 3, GFR 30-59 ml/min  -     rosuvastatin (CRESTOR) 5 MG tablet; Take 1 tablet (5 mg total) by mouth once daily.  Dispense: 90 tablet; Refill: 1  -     Vitamin D; Standing  -     Hypertension Digital Medicine (HDMP) Enrollment Order  -     Hypertension Digital Medicine ([facility]): Assign Onboarding Questionnaires  -     TSH; Future; Expected date: 03/27/2019    Breast cancer screening by mammogram  -     Mammo Digital Screening Bilat; Standing    Pedal edema   -     TSH; Future; Expected date: 03/27/2019  -     Brain natriuretic peptide; Standing    Encounter for fecal immunochemical test screening  -     Fecal Immunochemical Test (iFOBT); Future; Expected date: 03/27/2019          Future Appointments   Date Time Provider Department Center   5/27/2019 11:00 AM Sunny Holt OD [facility] [place]   8/22/2019  8:45 AM LAB, [facility] [place]   8/28/2019  9:40 AM Lang Cortez MD [facility] [place]  3/27/2019  9:56 AM

## 2019-04-01 ENCOUNTER — LAB VISIT (OUTPATIENT)
Dept: LAB | Facility: HOSPITAL | Age: 74
End: 2019-04-01
Attending: INTERNAL MEDICINE
Payer: MEDICARE

## 2019-04-01 DIAGNOSIS — Z12.11 ENCOUNTER FOR FECAL IMMUNOCHEMICAL TEST SCREENING: ICD-10-CM

## 2019-04-01 LAB — HEMOCCULT STL QL IA: NEGATIVE

## 2019-05-27 ENCOUNTER — OFFICE VISIT (OUTPATIENT)
Dept: OPTOMETRY | Facility: CLINIC | Age: 74
End: 2019-05-27
Payer: MEDICARE

## 2019-05-27 DIAGNOSIS — H40.053 BILATERAL OCULAR HYPERTENSION: Primary | ICD-10-CM

## 2019-05-27 PROCEDURE — 99999 PR PBB SHADOW E&M-EST. PATIENT-LVL II: CPT | Mod: PBBFAC,HCNC,, | Performed by: OPTOMETRIST

## 2019-05-27 PROCEDURE — 99999 PR PBB SHADOW E&M-EST. PATIENT-LVL II: ICD-10-PCS | Mod: PBBFAC,HCNC,, | Performed by: OPTOMETRIST

## 2019-05-27 PROCEDURE — 92012 PR EYE EXAM, EST PATIENT,INTERMED: ICD-10-PCS | Mod: HCNC,S$GLB,, | Performed by: OPTOMETRIST

## 2019-05-27 PROCEDURE — 92012 INTRM OPH EXAM EST PATIENT: CPT | Mod: HCNC,S$GLB,, | Performed by: OPTOMETRIST

## 2019-05-27 NOTE — PROGRESS NOTES
HPI     RAYMUNDO 01/2019  Here for IOP check today. Patient hasn't noticed any vision   changes.  Not using any drops.    Last edited by Karol Wetzel on 5/27/2019 10:57 AM. (History)            Assessment /Plan     For exam results, see Encounter Report.    Bilateral ocular hypertension      1. Iop lower, Cd ratio stable, no treat at this time, HPI     RAYMUNDO 01/2019  Here for IOP check today. Patient hasn't noticed any vision   changes.  Not using any drops.    Last edited by Karol Wetzel on 5/27/2019 10:57 AM. (History)            Assessment /Plan     For exam results, see Encounter Report.    Bilateral ocular hypertension      1. IOP lower today, OCT normal, HVF with pos fix losses and high sensitivity, gonio open, pachy thick, no fam history of glaucoma, RTC 6 mos for iop ck.

## 2019-08-22 ENCOUNTER — OFFICE VISIT (OUTPATIENT)
Dept: FAMILY MEDICINE | Facility: CLINIC | Age: 74
End: 2019-08-22
Payer: MEDICARE

## 2019-08-22 ENCOUNTER — LAB VISIT (OUTPATIENT)
Dept: LAB | Facility: HOSPITAL | Age: 74
End: 2019-08-22
Attending: INTERNAL MEDICINE
Payer: MEDICARE

## 2019-08-22 VITALS
BODY MASS INDEX: 31.76 KG/M2 | TEMPERATURE: 98 F | SYSTOLIC BLOOD PRESSURE: 130 MMHG | DIASTOLIC BLOOD PRESSURE: 68 MMHG | HEIGHT: 63 IN | HEART RATE: 76 BPM | WEIGHT: 179.25 LBS | OXYGEN SATURATION: 95 %

## 2019-08-22 DIAGNOSIS — N18.30 CKD (CHRONIC KIDNEY DISEASE) STAGE 3, GFR 30-59 ML/MIN: ICD-10-CM

## 2019-08-22 DIAGNOSIS — N25.81 HYPERPARATHYROIDISM DUE TO RENAL INSUFFICIENCY: ICD-10-CM

## 2019-08-22 DIAGNOSIS — I10 ESSENTIAL HYPERTENSION: ICD-10-CM

## 2019-08-22 DIAGNOSIS — T45.1X5A POLYNEUROPATHY FOLLOWING CHEMOTHERAPY: ICD-10-CM

## 2019-08-22 DIAGNOSIS — Z00.00 ENCOUNTER FOR PREVENTIVE HEALTH EXAMINATION: Primary | ICD-10-CM

## 2019-08-22 DIAGNOSIS — E66.9 OBESITY (BMI 30.0-34.9): ICD-10-CM

## 2019-08-22 DIAGNOSIS — I70.0 AORTIC ATHEROSCLEROSIS: ICD-10-CM

## 2019-08-22 DIAGNOSIS — M47.819 ARTHRITIS OF FACET JOINTS AT MULTIPLE VERTEBRAL LEVELS: ICD-10-CM

## 2019-08-22 DIAGNOSIS — E78.1 HYPERTRIGLYCERIDEMIA: ICD-10-CM

## 2019-08-22 DIAGNOSIS — Z78.0 POSTMENOPAUSAL: ICD-10-CM

## 2019-08-22 DIAGNOSIS — J84.10 CALCIFIED GRANULOMA OF LUNG: ICD-10-CM

## 2019-08-22 DIAGNOSIS — Z00.00 ANNUAL PHYSICAL EXAM: ICD-10-CM

## 2019-08-22 DIAGNOSIS — G62.0 POLYNEUROPATHY FOLLOWING CHEMOTHERAPY: ICD-10-CM

## 2019-08-22 PROBLEM — E66.811 OBESITY (BMI 30.0-34.9): Status: ACTIVE | Noted: 2019-08-22

## 2019-08-22 LAB
25(OH)D3+25(OH)D2 SERPL-MCNC: 44 NG/ML (ref 30–96)
ALBUMIN SERPL BCP-MCNC: 3.6 G/DL (ref 3.5–5.2)
ALP SERPL-CCNC: 92 U/L (ref 55–135)
ALT SERPL W/O P-5'-P-CCNC: 25 U/L (ref 10–44)
ANION GAP SERPL CALC-SCNC: 8 MMOL/L (ref 8–16)
AST SERPL-CCNC: 29 U/L (ref 10–40)
BILIRUB SERPL-MCNC: 0.6 MG/DL (ref 0.1–1)
BUN SERPL-MCNC: 25 MG/DL (ref 8–23)
CALCIUM SERPL-MCNC: 9.9 MG/DL (ref 8.7–10.5)
CHLORIDE SERPL-SCNC: 107 MMOL/L (ref 95–110)
CO2 SERPL-SCNC: 27 MMOL/L (ref 23–29)
CREAT SERPL-MCNC: 1.5 MG/DL (ref 0.5–1.4)
EST. GFR  (AFRICAN AMERICAN): 39 ML/MIN/1.73 M^2
EST. GFR  (NON AFRICAN AMERICAN): 33.8 ML/MIN/1.73 M^2
GLUCOSE SERPL-MCNC: 111 MG/DL (ref 70–110)
POTASSIUM SERPL-SCNC: 4.5 MMOL/L (ref 3.5–5.1)
PROT SERPL-MCNC: 6.4 G/DL (ref 6–8.4)
SODIUM SERPL-SCNC: 142 MMOL/L (ref 136–145)

## 2019-08-22 PROCEDURE — G0439 PPPS, SUBSEQ VISIT: HCPCS | Mod: HCNC,S$GLB,, | Performed by: NURSE PRACTITIONER

## 2019-08-22 PROCEDURE — 99499 RISK ADDL DX/OHS AUDIT: ICD-10-PCS | Mod: HCNC,S$GLB,, | Performed by: NURSE PRACTITIONER

## 2019-08-22 PROCEDURE — 99999 PR PBB SHADOW E&M-EST. PATIENT-LVL V: CPT | Mod: PBBFAC,HCNC,, | Performed by: NURSE PRACTITIONER

## 2019-08-22 PROCEDURE — 99999 PR PBB SHADOW E&M-EST. PATIENT-LVL V: ICD-10-PCS | Mod: PBBFAC,HCNC,, | Performed by: NURSE PRACTITIONER

## 2019-08-22 PROCEDURE — 36415 COLL VENOUS BLD VENIPUNCTURE: CPT | Mod: HCNC,PO

## 2019-08-22 PROCEDURE — 99499 UNLISTED E&M SERVICE: CPT | Mod: HCNC,S$GLB,, | Performed by: NURSE PRACTITIONER

## 2019-08-22 PROCEDURE — 3078F PR MOST RECENT DIASTOLIC BLOOD PRESSURE < 80 MM HG: ICD-10-PCS | Mod: HCNC,CPTII,S$GLB, | Performed by: NURSE PRACTITIONER

## 2019-08-22 PROCEDURE — 3075F PR MOST RECENT SYSTOLIC BLOOD PRESS GE 130-139MM HG: ICD-10-PCS | Mod: HCNC,CPTII,S$GLB, | Performed by: NURSE PRACTITIONER

## 2019-08-22 PROCEDURE — 3078F DIAST BP <80 MM HG: CPT | Mod: HCNC,CPTII,S$GLB, | Performed by: NURSE PRACTITIONER

## 2019-08-22 PROCEDURE — 80053 COMPREHEN METABOLIC PANEL: CPT | Mod: HCNC

## 2019-08-22 PROCEDURE — 3075F SYST BP GE 130 - 139MM HG: CPT | Mod: HCNC,CPTII,S$GLB, | Performed by: NURSE PRACTITIONER

## 2019-08-22 PROCEDURE — G0439 PR MEDICARE ANNUAL WELLNESS SUBSEQUENT VISIT: ICD-10-PCS | Mod: HCNC,S$GLB,, | Performed by: NURSE PRACTITIONER

## 2019-08-22 PROCEDURE — 82306 VITAMIN D 25 HYDROXY: CPT | Mod: HCNC

## 2019-08-22 NOTE — PROGRESS NOTES
"Misa Barajas presented for a  Medicare AWV and comprehensive Health Risk Assessment today. The following components were reviewed and updated:    · Medical history  · Family History  · Social history  · Allergies and Current Medications  · Health Risk Assessment  · Health Maintenance  · Care Team     ** See Completed Assessments for Annual Wellness Visit within the encounter summary.**       The following assessments were completed:  · Living Situation  · CAGE  · Depression Screening  · Timed Get Up and Go  · Whisper Test  · Cognitive Function Screening  · Nutrition Screening  · ADL Screening  · PAQ Screening    Vitals:    08/22/19 0854   BP: 130/68   BP Location: Left arm   Patient Position: Sitting   BP Method: Medium (Manual)   Pulse: 76   Temp: 97.7 °F (36.5 °C)   TempSrc: Oral   SpO2: 95%   Weight: 81.3 kg (179 lb 3.7 oz)   Height: 5' 3" (1.6 m)     Body mass index is 31.75 kg/m².     Physical Exam   Constitutional: She is oriented to person, place, and time. She appears well-developed. No distress.   obese   HENT:   Head: Normocephalic and atraumatic.   Eyes: Pupils are equal, round, and reactive to light. EOM are normal.   Neck: Neck supple. No JVD present. No tracheal deviation present.   Cardiovascular: Normal rate, regular rhythm, normal heart sounds and intact distal pulses.   No murmur heard.  Pulmonary/Chest: Effort normal and breath sounds normal. No respiratory distress. She has no wheezes. She has no rales.   Abdominal: Soft. Bowel sounds are normal. She exhibits no distension and no mass. There is no tenderness.   Musculoskeletal: Normal range of motion. She exhibits no edema or tenderness.   Neurological: She is alert and oriented to person, place, and time. Coordination normal.   Skin: Skin is warm and dry. No erythema. No pallor.   Psychiatric: She has a normal mood and affect. Her behavior is normal. Judgment and thought content normal. Cognition and memory are normal. She expresses no " homicidal and no suicidal ideation.   Nursing note and vitals reviewed.        Diagnoses and health risks identified today and associated recommendations/orders:    1. Encounter for preventive health examination    2. Essential hypertension  Chronic; stable on medication.  Followed by PCP.    3. Hypertriglyceridemia  Chronic; stable on medication.  Followed by PCP.    4. Aortic atherosclerosis  Chronic; stable.  Followed by PCP.    5. Calcified granuloma of lung  Chronic; stable.  Followed by PCP.    6. CKD (chronic kidney disease) stage 3, GFR 30-59 ml/min  Chronic; stable.  Followed by PCP.    7. Hyperparathyroidism due to renal insufficiency  Chronic; stable.  Followed by PCP.    8. Polyneuropathy following chemotherapy  Chronic; stable.  Followed by PCP.    9. Arthritis of facet joints at multiple vertebral levels  Chronic; stable.  Followed by PCP.    10. Obesity (BMI 30.0-34.9)  Chronic, stable. Therapeutic lifestyle changes discussed. Followed by PCP.    11. Postmenopausal  - DXA Bone Density Spine And Hip; Future      Provided Misa with a 5-10 year written screening schedule and personal prevention plan. Recommendations were developed using the USPSTF age appropriate recommendations. Education, counseling, and referrals were provided as needed. After Visit Summary printed and given to patient which includes a list of additional screenings\tests needed.    Follow up in 6 days (on 8/28/2019) for follow-up with PCP, Annual Wellness Visit in 1 year.    Missy Waldrop NP         I offered to discuss end of life issues, including information on how to make advance directives that the patient could use to name someone who would make medical decisions on their behalf if they became too ill to make themselves.    ___Patient declined  _X_Patient is interested, I provided paper work and offered to discuss.

## 2019-08-28 ENCOUNTER — OFFICE VISIT (OUTPATIENT)
Dept: INTERNAL MEDICINE | Facility: CLINIC | Age: 74
End: 2019-08-28
Payer: MEDICARE

## 2019-08-28 VITALS
HEIGHT: 63 IN | SYSTOLIC BLOOD PRESSURE: 120 MMHG | HEART RATE: 89 BPM | TEMPERATURE: 99 F | BODY MASS INDEX: 31.95 KG/M2 | DIASTOLIC BLOOD PRESSURE: 86 MMHG | OXYGEN SATURATION: 96 % | WEIGHT: 180.31 LBS

## 2019-08-28 DIAGNOSIS — I83.893 VARICOSE VEINS OF BOTH LEGS WITH EDEMA: ICD-10-CM

## 2019-08-28 DIAGNOSIS — I10 ESSENTIAL HYPERTENSION: Primary | ICD-10-CM

## 2019-08-28 DIAGNOSIS — N18.30 CKD (CHRONIC KIDNEY DISEASE) STAGE 3, GFR 30-59 ML/MIN: ICD-10-CM

## 2019-08-28 DIAGNOSIS — Z53.20 LUNG CANCER SCREENING DECLINED BY PATIENT: ICD-10-CM

## 2019-08-28 PROCEDURE — 3074F PR MOST RECENT SYSTOLIC BLOOD PRESSURE < 130 MM HG: ICD-10-PCS | Mod: HCNC,CPTII,S$GLB, | Performed by: INTERNAL MEDICINE

## 2019-08-28 PROCEDURE — 1101F PR PT FALLS ASSESS DOC 0-1 FALLS W/OUT INJ PAST YR: ICD-10-PCS | Mod: HCNC,CPTII,S$GLB, | Performed by: INTERNAL MEDICINE

## 2019-08-28 PROCEDURE — 3074F SYST BP LT 130 MM HG: CPT | Mod: HCNC,CPTII,S$GLB, | Performed by: INTERNAL MEDICINE

## 2019-08-28 PROCEDURE — 99499 RISK ADDL DX/OHS AUDIT: ICD-10-PCS | Mod: HCNC,S$GLB,, | Performed by: INTERNAL MEDICINE

## 2019-08-28 PROCEDURE — 1101F PT FALLS ASSESS-DOCD LE1/YR: CPT | Mod: HCNC,CPTII,S$GLB, | Performed by: INTERNAL MEDICINE

## 2019-08-28 PROCEDURE — 99999 PR PBB SHADOW E&M-EST. PATIENT-LVL III: CPT | Mod: PBBFAC,HCNC,, | Performed by: INTERNAL MEDICINE

## 2019-08-28 PROCEDURE — 3079F DIAST BP 80-89 MM HG: CPT | Mod: HCNC,CPTII,S$GLB, | Performed by: INTERNAL MEDICINE

## 2019-08-28 PROCEDURE — 99999 PR PBB SHADOW E&M-EST. PATIENT-LVL III: ICD-10-PCS | Mod: PBBFAC,HCNC,, | Performed by: INTERNAL MEDICINE

## 2019-08-28 PROCEDURE — 3079F PR MOST RECENT DIASTOLIC BLOOD PRESSURE 80-89 MM HG: ICD-10-PCS | Mod: HCNC,CPTII,S$GLB, | Performed by: INTERNAL MEDICINE

## 2019-08-28 PROCEDURE — 99499 UNLISTED E&M SERVICE: CPT | Mod: HCNC,S$GLB,, | Performed by: INTERNAL MEDICINE

## 2019-08-28 PROCEDURE — 99214 PR OFFICE/OUTPT VISIT, EST, LEVL IV, 30-39 MIN: ICD-10-PCS | Mod: HCNC,S$GLB,, | Performed by: INTERNAL MEDICINE

## 2019-08-28 PROCEDURE — 99214 OFFICE O/P EST MOD 30 MIN: CPT | Mod: HCNC,S$GLB,, | Performed by: INTERNAL MEDICINE

## 2019-08-28 NOTE — PATIENT INSTRUCTIONS
GOAL Systolic (120s)  Too Low < 100     Goal Diastolic (70s)   Too low < 50     Pulse Goal 50-90   Too low < 50 at rest  Too fast > 100 at rest    Give us a call or persistent abnormalities         Step-by-Step  Putting on Knee-High Compression Stockings    Date Last Reviewed: 7/10/2015  © 5817-0207 [employer]. [address] [place] [address], [place] [address]. All rights reserved. This information is not intended as a substitute for professional medical care. Always follow your healthcare professional's instructions.

## 2019-08-28 NOTE — PROGRESS NOTES
"Portions of this note are generated with voice recognition software. Typographical errors may exist.     SUBJECTIVE:    This is a/an 73 y.o. female here for primary care visit for  Chief Complaint   Patient presents with    Follow-up     results     Patient comes with detailed home blood pressure log indicating average systolic blood pressure in the low 120s and diastolic blood pressure in the 60s.  She is complying with blood pressure medications.  Also complying with 2000 units of vitamin-D.    Patient continues to have chronic pedal edema due to varicose veins.  She does not have a history of using compression stockings but is willing to use them to help prevent skin complications due to chronic stasis.    Counseling today regarding the benefits of lung cancer screening.. Explained that smoking cessation is a more proven and powerful intervention for preventing death and complications from lung cancer and other diseases than screening. Also explained that lung cancer screening requires an ongoing commitment; cancers are detected on initial and annual studies, and a single baseline study is insufficient and that the most likely "positive" result of screening is detection of benign nodules requiring further evaluation, and this evaluation may require invasive studies, possibly even surgery.    Patient chooses to opt out on screening for now due to cost.           Medications Reviewed and Updated    Past medical, family, and social histories were reviewed and updated.    Review of Systems negative unless otherwise noted in history of present illness-  ROS    General ROS: negative  Psychological ROS: negative  ENT ROS: negative  Endocrine ROS: Negative  Allergy and Immunology ROS: negative  Cardiovascular ROS: negative  Pulmonary ROS: Negative  Gastrointestinal ROS: negative  Genito-Urinary ROS: negative  Musculoskeletal ROS: negative  Neurological ROS: negative  Dermatological ROS: negative        Allergic:  "   Review of patient's allergies indicates:   Allergen Reactions    Adhesive Rash     Pt states she removed a LATEX bandaid and the skin beneath was swollen and red. No other latex causes a reaction.       OBJECTIVE:  BP: 120/86 Pulse: 89 Temp: 98.5 °F (36.9 °C)  Wt Readings from Last 3 Encounters:   08/28/19 81.8 kg (180 lb 5.4 oz)   08/22/19 81.3 kg (179 lb 3.7 oz)   03/27/19 82.2 kg (181 lb 3.5 oz)    Body mass index is 31.95 kg/m².  Previous Blood Pressure Readings :   BP Readings from Last 3 Encounters:   08/28/19 120/86   08/22/19 130/68   03/27/19 118/62       Physical Exam    GEN: No apparent distress  HEENT: sclera non-icteric, conjunctiva clear  CV: 2+ peripheral edema, ankles regular rate and rhythm  PULM: breathing non-labored  ABD: non, protuberant abdomen.  PSYCH: appropriate affect  MSK: able to rise from chair without assistance  SKIN: normal skin turgor    Pertinent Labs Reviewed       ASSESSMENT/PLAN:    Essential hypertension.Condition stable.  Counseling given today on self-care measures. Plan to monitor clinically. Continue current medical plan.     CKD (chronic kidney disease) stage 3, GFR 30-59 ml/min.Condition stable.  Counseling given today on self-care measures. Plan to monitor clinically. Continue current medical plan.     Varicose veins of both legs with edema.Condition not optimally controlled. Detailed counseling on self care measures. Plan to monitor clinically in addition to plan below or as listed on After Visit Summary.    - stockings, OTC    Lung cancer screening declined by patient          Future Appointments   Date Time Provider Department Center   8/30/2019 10:20 AM [facility], DEXA1 [facility] [address]   10/28/2019 10:20 AM Lang Cortez MD [facility]       Lang Cortez  8/28/2019  12:14 PM

## 2019-08-30 ENCOUNTER — HOSPITAL ENCOUNTER (OUTPATIENT)
Dept: RADIOLOGY | Facility: CLINIC | Age: 74
Discharge: HOME | End: 2019-08-30
Attending: NURSE PRACTITIONER
Payer: MEDICARE

## 2019-08-30 DIAGNOSIS — Z78.0 POSTMENOPAUSAL: ICD-10-CM

## 2019-08-30 PROCEDURE — 77080 DXA BONE DENSITY AXIAL: CPT | Mod: TC,HCNC

## 2019-08-30 PROCEDURE — 77080 DXA BONE DENSITY AXIAL: CPT | Mod: 26,HCNC,, | Performed by: INTERNAL MEDICINE

## 2019-08-30 PROCEDURE — 77080 DEXA BONE DENSITY SPINE HIP: ICD-10-PCS | Mod: 26,HCNC,, | Performed by: INTERNAL MEDICINE

## 2019-09-16 ENCOUNTER — HOSPITAL ENCOUNTER (OUTPATIENT)
Dept: RADIOLOGY | Facility: HOSPITAL | Age: 74
Discharge: HOME | End: 2019-09-16
Attending: INTERNAL MEDICINE
Payer: MEDICARE

## 2019-09-16 DIAGNOSIS — R92.8 ABNORMAL MAMMOGRAM: ICD-10-CM

## 2019-09-16 PROCEDURE — 77065 MAMMO DIGITAL DIAGNOSTIC RIGHT WITH TOMOSYNTHESIS_CAD: ICD-10-PCS | Mod: 26,HCNC,, | Performed by: RADIOLOGY

## 2019-09-16 PROCEDURE — 77065 DX MAMMO INCL CAD UNI: CPT | Mod: TC,HCNC,PO

## 2019-09-16 PROCEDURE — 77061 MAMMO DIGITAL DIAGNOSTIC RIGHT WITH TOMOSYNTHESIS_CAD: ICD-10-PCS | Mod: 26,HCNC,, | Performed by: RADIOLOGY

## 2019-09-16 PROCEDURE — 77061 BREAST TOMOSYNTHESIS UNI: CPT | Mod: TC,HCNC,PO

## 2019-09-16 PROCEDURE — 77065 DX MAMMO INCL CAD UNI: CPT | Mod: 26,HCNC,, | Performed by: RADIOLOGY

## 2019-09-16 PROCEDURE — 77061 BREAST TOMOSYNTHESIS UNI: CPT | Mod: 26,HCNC,, | Performed by: RADIOLOGY

## 2019-09-20 DIAGNOSIS — N18.30 CKD (CHRONIC KIDNEY DISEASE) STAGE 3, GFR 30-59 ML/MIN: ICD-10-CM

## 2019-09-20 DIAGNOSIS — I70.0 AORTIC ATHEROSCLEROSIS: ICD-10-CM

## 2019-09-20 DIAGNOSIS — I10 ESSENTIAL HYPERTENSION: ICD-10-CM

## 2019-09-20 NOTE — TELEPHONE ENCOUNTER
----- Message from Komal Andrade sent at 9/20/2019  9:08 AM CDT -----  Type:  RX Refill Request    Who Called:self  Refill or New Rx:refill   RX Name and Strength:losartan (COZAAR) 50 MG tablet  How is the patient currently taking it? (ex. 1XDay): Take 1 tablet (50 mg total) by mouth once daily  Is this a 30 day or 90 day RX:90 day  Preferred Pharmacy with phone number:[facility] [address]  Local or Mail Order:local  Ordering Provider:Dr. Cortez   Would the patient rather a call back or a response via [facility]? callback  Best Call Back Number:[phone]  Additional Information: n/a

## 2019-09-20 NOTE — TELEPHONE ENCOUNTER
----- Message from Komal Andrade sent at 9/20/2019  9:08 AM CDT -----  Type:  RX Refill Request    Who Called:self   Refill or New Rx:refill   RX Name and Strength:rosuvastatin (CRESTOR) 5 MG tablet  How is the patient currently taking it? (ex. 1XDay):Take 1 tablet (5 mg total) by mouth once daily  Is this a 30 day or 90 day RX:90 day   Preferred Pharmacy with phone number:[facility] [address]  Local or Mail Order:local  Ordering Provider:  Would the patient rather a call back or a response via [facility]? callback  Best Call Back Number:[phone]  Additional Information:N/A

## 2019-09-22 ENCOUNTER — PATIENT MESSAGE (OUTPATIENT)
Dept: INTERNAL MEDICINE | Facility: CLINIC | Age: 74
End: 2019-09-22

## 2019-09-22 DIAGNOSIS — I10 ESSENTIAL HYPERTENSION: ICD-10-CM

## 2019-09-22 DIAGNOSIS — N18.30 CKD (CHRONIC KIDNEY DISEASE) STAGE 3, GFR 30-59 ML/MIN: ICD-10-CM

## 2019-09-22 DIAGNOSIS — I70.0 AORTIC ATHEROSCLEROSIS: ICD-10-CM

## 2019-09-23 ENCOUNTER — PATIENT MESSAGE (OUTPATIENT)
Dept: INTERNAL MEDICINE | Facility: CLINIC | Age: 74
End: 2019-09-23

## 2019-09-23 RX ORDER — LOSARTAN POTASSIUM 50 MG/1
50 TABLET ORAL DAILY
Qty: 90 TABLET | Refills: 1 | OUTPATIENT
Start: 2019-09-23 | End: 2020-09-22

## 2019-09-23 RX ORDER — ROSUVASTATIN CALCIUM 5 MG/1
TABLET, COATED ORAL
Qty: 90 TABLET | Refills: 1 | Status: SHIPPED | OUTPATIENT
Start: 2019-09-23 | End: 2020-03-14 | Stop reason: SDUPTHER

## 2019-09-23 RX ORDER — ROSUVASTATIN CALCIUM 5 MG/1
5 TABLET, COATED ORAL DAILY
Qty: 90 TABLET | Refills: 1 | OUTPATIENT
Start: 2019-09-23 | End: 2020-03-21

## 2019-09-23 RX ORDER — AMLODIPINE BESYLATE 5 MG/1
TABLET ORAL
Qty: 90 TABLET | Refills: 1 | Status: SHIPPED | OUTPATIENT
Start: 2019-09-23 | End: 2020-03-14 | Stop reason: SDUPTHER

## 2019-09-23 RX ORDER — LOSARTAN POTASSIUM 50 MG/1
TABLET ORAL
Qty: 90 TABLET | Refills: 1 | Status: SHIPPED | OUTPATIENT
Start: 2019-09-23 | End: 2020-03-14 | Stop reason: SDUPTHER

## 2019-10-28 ENCOUNTER — OFFICE VISIT (OUTPATIENT)
Dept: INTERNAL MEDICINE | Facility: CLINIC | Age: 74
End: 2019-10-28
Payer: MEDICARE

## 2019-10-28 VITALS
DIASTOLIC BLOOD PRESSURE: 66 MMHG | HEIGHT: 63 IN | WEIGHT: 179.25 LBS | BODY MASS INDEX: 31.76 KG/M2 | HEART RATE: 81 BPM | OXYGEN SATURATION: 94 % | SYSTOLIC BLOOD PRESSURE: 118 MMHG

## 2019-10-28 DIAGNOSIS — R60.0 PEDAL EDEMA: ICD-10-CM

## 2019-10-28 DIAGNOSIS — I10 ESSENTIAL HYPERTENSION: ICD-10-CM

## 2019-10-28 DIAGNOSIS — I51.89 DIASTOLIC DYSFUNCTION: Primary | ICD-10-CM

## 2019-10-28 PROCEDURE — 99213 PR OFFICE/OUTPT VISIT, EST, LEVL III, 20-29 MIN: ICD-10-PCS | Mod: HCNC,S$GLB,, | Performed by: INTERNAL MEDICINE

## 2019-10-28 PROCEDURE — 99999 PR PBB SHADOW E&M-EST. PATIENT-LVL III: ICD-10-PCS | Mod: PBBFAC,HCNC,, | Performed by: INTERNAL MEDICINE

## 2019-10-28 PROCEDURE — 3074F SYST BP LT 130 MM HG: CPT | Mod: HCNC,CPTII,S$GLB, | Performed by: INTERNAL MEDICINE

## 2019-10-28 PROCEDURE — 1101F PT FALLS ASSESS-DOCD LE1/YR: CPT | Mod: HCNC,CPTII,S$GLB, | Performed by: INTERNAL MEDICINE

## 2019-10-28 PROCEDURE — 3078F PR MOST RECENT DIASTOLIC BLOOD PRESSURE < 80 MM HG: ICD-10-PCS | Mod: HCNC,CPTII,S$GLB, | Performed by: INTERNAL MEDICINE

## 2019-10-28 PROCEDURE — 3078F DIAST BP <80 MM HG: CPT | Mod: HCNC,CPTII,S$GLB, | Performed by: INTERNAL MEDICINE

## 2019-10-28 PROCEDURE — 3074F PR MOST RECENT SYSTOLIC BLOOD PRESSURE < 130 MM HG: ICD-10-PCS | Mod: HCNC,CPTII,S$GLB, | Performed by: INTERNAL MEDICINE

## 2019-10-28 PROCEDURE — 99213 OFFICE O/P EST LOW 20 MIN: CPT | Mod: HCNC,S$GLB,, | Performed by: INTERNAL MEDICINE

## 2019-10-28 PROCEDURE — 1101F PR PT FALLS ASSESS DOC 0-1 FALLS W/OUT INJ PAST YR: ICD-10-PCS | Mod: HCNC,CPTII,S$GLB, | Performed by: INTERNAL MEDICINE

## 2019-10-28 PROCEDURE — 99999 PR PBB SHADOW E&M-EST. PATIENT-LVL III: CPT | Mod: PBBFAC,HCNC,, | Performed by: INTERNAL MEDICINE

## 2019-10-28 NOTE — PROGRESS NOTES
Portions of this note are generated with voice recognition software. Typographical errors may exist.     SUBJECTIVE:    This is a/an 74 y.o. female here for primary care visit for  Chief Complaint   Patient presents with    Follow-up     edema     Patient comes in with home blood pressure log.  She checks twice daily.  Average systolic blood pressure in the 120s and diastolic average blood pressure in the 70s.  She is complying with compression stockings.  Left lower extremity has more edema than right lower extremity chronically.  She did not have issues like this with recurring pedal edema 2 years ago when she had her last cardiac ultrasound.    Answers for HPI/ROS submitted by the patient on 10/22/2019   activity change: No  unexpected weight change: No  rhinorrhea: No  trouble swallowing: No  visual disturbance: No  chest tightness: No  polyuria: No  difficulty urinating: No  menstrual problem: No  joint swelling: No  arthralgias: No  confusion: No  dysphoric mood: No      Medications Reviewed and Updated    Past medical, family, and social histories were reviewed and updated.    Review of Systems negative unless otherwise noted in history of present illness-  ROS    General ROS: negative  Psychological ROS: negative  ENT ROS: negative  Endocrine ROS: Negative  Allergy and Immunology ROS: negative  Cardiovascular ROS: negative  Pulmonary ROS: Negative  Gastrointestinal ROS: negative  Genito-Urinary ROS: negative  Musculoskeletal ROS: negative  Neurological ROS: negative      Allergic:    Review of patient's allergies indicates:   Allergen Reactions    Adhesive Rash     Pt states she removed a LATEX bandaid and the skin beneath was swollen and red. No other latex causes a reaction.       OBJECTIVE:  BP: 118/66 Pulse: 81    Wt Readings from Last 3 Encounters:   10/28/19 81.3 kg (179 lb 3.7 oz)   08/28/19 81.8 kg (180 lb 5.4 oz)   08/22/19 81.3 kg (179 lb 3.7 oz)    Body mass index is 31.75 kg/m².  Previous Blood  Pressure Readings :   BP Readings from Last 3 Encounters:   10/28/19 118/66   08/28/19 120/86   08/22/19 130/68       Physical Exam    GEN: No apparent distress  HEENT: sclera non-icteric, conjunctiva clear  CV: no peripheral edema  PULM: breathing non-labored  ABD: Obese, protuberant abdomen.  PSYCH: appropriate affect  MSK: able to rise from chair without assistance  SKIN: normal skin turgor    Pertinent Labs Reviewed       ASSESSMENT/PLAN:    Diastolic dysfunction.Etiology likely multifactorial. Not controlled. Further evaluation warranted.  Recommendations as below or as written on After Visit Summary.   -     Echo; Future    Essential hypertension.Condition stable.  Counseling given today on self-care measures. Plan to monitor clinically. Continue current medical plan.         Future Appointments   Date Time Provider Department Center   [facility] [address]   11/25/2019 10:00 AM Sunny Holt OD [facility] [place]   12/16/2019 11:20 AM Lang Cortez MD [facility] Al Cortez  10/28/2019  11:05 AM

## 2019-11-06 ENCOUNTER — HOSPITAL ENCOUNTER (OUTPATIENT)
Dept: CARDIOLOGY | Facility: CLINIC | Age: 74
Discharge: HOME | End: 2019-11-06
Attending: INTERNAL MEDICINE
Payer: MEDICARE

## 2019-11-06 VITALS
WEIGHT: 179 LBS | SYSTOLIC BLOOD PRESSURE: 144 MMHG | DIASTOLIC BLOOD PRESSURE: 83 MMHG | HEIGHT: 63 IN | HEART RATE: 83 BPM | BODY MASS INDEX: 31.71 KG/M2

## 2019-11-06 DIAGNOSIS — R60.0 PEDAL EDEMA: ICD-10-CM

## 2019-11-06 DIAGNOSIS — I51.89 DIASTOLIC DYSFUNCTION: ICD-10-CM

## 2019-11-06 LAB
ASCENDING AORTA: 2.25 CM
AV INDEX (PROSTH): 0.7
AV MEAN GRADIENT: 3 MMHG
AV PEAK GRADIENT: 5 MMHG
AV VALVE AREA: 2.1 CM2
AV VELOCITY RATIO: 0.68
BSA FOR ECHO PROCEDURE: 1.9 M2
CV ECHO LV RWT: 0.41 CM
DOP CALC AO PEAK VEL: 1.16 M/S
DOP CALC AO VTI: 23.88 CM
DOP CALC LVOT AREA: 3 CM2
DOP CALC LVOT DIAMETER: 1.95 CM
DOP CALC LVOT PEAK VEL: 0.79 M/S
DOP CALC LVOT STROKE VOLUME: 50.09 CM3
DOP CALCLVOT PEAK VEL VTI: 16.78 CM
E WAVE DECELERATION TIME: 212.52 MSEC
E/A RATIO: 0.95
E/E' RATIO: 20.6 M/S
ECHO LV POSTERIOR WALL: 0.9 CM (ref 0.6–1.1)
FRACTIONAL SHORTENING: 35 % (ref 28–44)
INTERVENTRICULAR SEPTUM: 1.04 CM (ref 0.6–1.1)
IVRT: 0.11 MSEC
LA MAJOR: 4.38 CM
LA MINOR: 4.33 CM
LA WIDTH: 4.2 CM
LEFT ATRIUM SIZE: 3.17 CM
LEFT ATRIUM VOLUME INDEX: 26.7 ML/M2
LEFT ATRIUM VOLUME: 49.28 CM3
LEFT INTERNAL DIMENSION IN SYSTOLE: 2.86 CM (ref 2.1–4)
LEFT VENTRICLE DIASTOLIC VOLUME INDEX: 41.64 ML/M2
LEFT VENTRICLE DIASTOLIC VOLUME: 76.81 ML
LEFT VENTRICLE MASS INDEX: 77 G/M2
LEFT VENTRICLE SYSTOLIC VOLUME INDEX: 16.9 ML/M2
LEFT VENTRICLE SYSTOLIC VOLUME: 31.1 ML
LEFT VENTRICULAR INTERNAL DIMENSION IN DIASTOLE: 4.4 CM (ref 3.5–6)
LEFT VENTRICULAR MASS: 141.75 G
LV LATERAL E/E' RATIO: 17.17 M/S
LV SEPTAL E/E' RATIO: 25.75 M/S
MV PEAK A VEL: 1.08 M/S
MV PEAK E VEL: 1.03 M/S
PISA TR MAX VEL: 2.6 M/S
RA MAJOR: 4.13 CM
RA PRESSURE: 3 MMHG
RA WIDTH: 3.73 CM
RIGHT VENTRICULAR END-DIASTOLIC DIMENSION: 3.58 CM
RV TISSUE DOPPLER FREE WALL SYSTOLIC VELOCITY 1 (APICAL 4 CHAMBER VIEW): 11.62 CM/S
SINUS: 3.08 CM
STJ: 2.69 CM
TDI LATERAL: 0.06 M/S
TDI SEPTAL: 0.04 M/S
TDI: 0.05 M/S
TR MAX PG: 27 MMHG
TRICUSPID ANNULAR PLANE SYSTOLIC EXCURSION: 2.18 CM
TV REST PULMONARY ARTERY PRESSURE: 30 MMHG

## 2019-11-06 PROCEDURE — 93306 ECHO (CUPID ONLY): ICD-10-PCS | Mod: HCNC,S$GLB,, | Performed by: INTERNAL MEDICINE

## 2019-11-06 PROCEDURE — 93306 TTE W/DOPPLER COMPLETE: CPT | Mod: HCNC,S$GLB,, | Performed by: INTERNAL MEDICINE

## 2019-12-23 ENCOUNTER — LAB VISIT (OUTPATIENT)
Dept: LAB | Facility: HOSPITAL | Age: 74
End: 2019-12-23
Attending: INTERNAL MEDICINE
Payer: MEDICARE

## 2019-12-23 ENCOUNTER — OFFICE VISIT (OUTPATIENT)
Dept: INTERNAL MEDICINE | Facility: CLINIC | Age: 74
End: 2019-12-23
Payer: MEDICARE

## 2019-12-23 VITALS
DIASTOLIC BLOOD PRESSURE: 74 MMHG | HEART RATE: 89 BPM | HEIGHT: 63 IN | WEIGHT: 184.06 LBS | BODY MASS INDEX: 32.61 KG/M2 | SYSTOLIC BLOOD PRESSURE: 136 MMHG | OXYGEN SATURATION: 96 %

## 2019-12-23 DIAGNOSIS — I10 ESSENTIAL HYPERTENSION: ICD-10-CM

## 2019-12-23 DIAGNOSIS — R60.0 PEDAL EDEMA: ICD-10-CM

## 2019-12-23 DIAGNOSIS — Z00.00 ANNUAL PHYSICAL EXAM: ICD-10-CM

## 2019-12-23 DIAGNOSIS — I10 ESSENTIAL HYPERTENSION: Primary | ICD-10-CM

## 2019-12-23 DIAGNOSIS — N25.81 HYPERPARATHYROIDISM DUE TO RENAL INSUFFICIENCY: ICD-10-CM

## 2019-12-23 DIAGNOSIS — I50.32 CHRONIC DIASTOLIC HEART FAILURE: ICD-10-CM

## 2019-12-23 LAB
ALBUMIN SERPL BCP-MCNC: 3.8 G/DL (ref 3.5–5.2)
ALP SERPL-CCNC: 94 U/L (ref 55–135)
ALT SERPL W/O P-5'-P-CCNC: 29 U/L (ref 10–44)
ANION GAP SERPL CALC-SCNC: 8 MMOL/L (ref 8–16)
AST SERPL-CCNC: 34 U/L (ref 10–40)
BILIRUB SERPL-MCNC: 0.7 MG/DL (ref 0.1–1)
BNP SERPL-MCNC: 159 PG/ML (ref 0–99)
BUN SERPL-MCNC: 24 MG/DL (ref 8–23)
CALCIUM SERPL-MCNC: 10.4 MG/DL (ref 8.7–10.5)
CHLORIDE SERPL-SCNC: 108 MMOL/L (ref 95–110)
CO2 SERPL-SCNC: 25 MMOL/L (ref 23–29)
CREAT SERPL-MCNC: 1.5 MG/DL (ref 0.5–1.4)
EST. GFR  (AFRICAN AMERICAN): 38.7 ML/MIN/1.73 M^2
EST. GFR  (NON AFRICAN AMERICAN): 33.6 ML/MIN/1.73 M^2
GLUCOSE SERPL-MCNC: 103 MG/DL (ref 70–110)
POTASSIUM SERPL-SCNC: 4.9 MMOL/L (ref 3.5–5.1)
PROT SERPL-MCNC: 7.1 G/DL (ref 6–8.4)
SODIUM SERPL-SCNC: 141 MMOL/L (ref 136–145)

## 2019-12-23 PROCEDURE — 3075F SYST BP GE 130 - 139MM HG: CPT | Mod: HCNC,CPTII,S$GLB, | Performed by: INTERNAL MEDICINE

## 2019-12-23 PROCEDURE — 3075F PR MOST RECENT SYSTOLIC BLOOD PRESS GE 130-139MM HG: ICD-10-PCS | Mod: HCNC,CPTII,S$GLB, | Performed by: INTERNAL MEDICINE

## 2019-12-23 PROCEDURE — 36415 COLL VENOUS BLD VENIPUNCTURE: CPT | Mod: HCNC,PO

## 2019-12-23 PROCEDURE — 99213 PR OFFICE/OUTPT VISIT, EST, LEVL III, 20-29 MIN: ICD-10-PCS | Mod: HCNC,S$GLB,, | Performed by: INTERNAL MEDICINE

## 2019-12-23 PROCEDURE — 3078F PR MOST RECENT DIASTOLIC BLOOD PRESSURE < 80 MM HG: ICD-10-PCS | Mod: HCNC,CPTII,S$GLB, | Performed by: INTERNAL MEDICINE

## 2019-12-23 PROCEDURE — 83880 ASSAY OF NATRIURETIC PEPTIDE: CPT | Mod: HCNC

## 2019-12-23 PROCEDURE — 99213 OFFICE O/P EST LOW 20 MIN: CPT | Mod: HCNC,S$GLB,, | Performed by: INTERNAL MEDICINE

## 2019-12-23 PROCEDURE — 99499 UNLISTED E&M SERVICE: CPT | Mod: HCNC,S$GLB,, | Performed by: INTERNAL MEDICINE

## 2019-12-23 PROCEDURE — 1101F PT FALLS ASSESS-DOCD LE1/YR: CPT | Mod: HCNC,CPTII,S$GLB, | Performed by: INTERNAL MEDICINE

## 2019-12-23 PROCEDURE — 1126F AMNT PAIN NOTED NONE PRSNT: CPT | Mod: HCNC,S$GLB,, | Performed by: INTERNAL MEDICINE

## 2019-12-23 PROCEDURE — 1159F PR MEDICATION LIST DOCUMENTED IN MEDICAL RECORD: ICD-10-PCS | Mod: HCNC,S$GLB,, | Performed by: INTERNAL MEDICINE

## 2019-12-23 PROCEDURE — 80053 COMPREHEN METABOLIC PANEL: CPT | Mod: HCNC

## 2019-12-23 PROCEDURE — 99999 PR PBB SHADOW E&M-EST. PATIENT-LVL III: CPT | Mod: PBBFAC,HCNC,, | Performed by: INTERNAL MEDICINE

## 2019-12-23 PROCEDURE — 99999 PR PBB SHADOW E&M-EST. PATIENT-LVL III: ICD-10-PCS | Mod: PBBFAC,HCNC,, | Performed by: INTERNAL MEDICINE

## 2019-12-23 PROCEDURE — 1126F PR PAIN SEVERITY QUANTIFIED, NO PAIN PRESENT: ICD-10-PCS | Mod: HCNC,S$GLB,, | Performed by: INTERNAL MEDICINE

## 2019-12-23 PROCEDURE — 1159F MED LIST DOCD IN RCRD: CPT | Mod: HCNC,S$GLB,, | Performed by: INTERNAL MEDICINE

## 2019-12-23 PROCEDURE — 99499 RISK ADDL DX/OHS AUDIT: ICD-10-PCS | Mod: HCNC,S$GLB,, | Performed by: INTERNAL MEDICINE

## 2019-12-23 PROCEDURE — 1101F PR PT FALLS ASSESS DOC 0-1 FALLS W/OUT INJ PAST YR: ICD-10-PCS | Mod: HCNC,CPTII,S$GLB, | Performed by: INTERNAL MEDICINE

## 2019-12-23 PROCEDURE — 3078F DIAST BP <80 MM HG: CPT | Mod: HCNC,CPTII,S$GLB, | Performed by: INTERNAL MEDICINE

## 2019-12-23 NOTE — PATIENT INSTRUCTIONS
Digital Medicine Enrollment   Fill out the enrollment questionnaire today on [facility].  Then contact the program directly at the following number so that you can make arrangements to  the equipment for digital medicine.      Picking Up Your Equipment   You will be given the choice to  the equipment at a location with an [facility]. This is preferred so the program staff can set up the equipment and teach you how to use the equipment correctly. If you cannot go to one of these locations you can ask for the device to be delivered and you can attempt to set up the equipment at home. If you chose this option and have difficulty setting it up you can also bring the equipment with you to the next visit with Dr. Cortez and his staff can help you get up and running.     [facility] Technical Support at [phone]

## 2019-12-23 NOTE — PROGRESS NOTES
Portions of this note are generated with voice recognition software. Typographical errors may exist.     SUBJECTIVE:    This is a/an 74 y.o. female here for primary care visit for  Chief Complaint   Patient presents with    Hypertension    Foot Swelling     left      Patient states that she has been checking her home blood pressure but unfortunately does not come prepared with home blood pressure numbers.  By recall she thinks that her systolic blood pressure on average is in the lower 130s.  She recognizes that her current results indicate that she has a degree of diastolic heart failure.  This is a new diagnosis for her.  Patient states that she would be willing to pursue more stringent blood pressure goals.  She would like to proceed with the home blood pressure monitoring program.  She is complying with current medication.  She wears compression stockings consistently.  She has not been having problems with dermatitis or blistering of the skin bilaterally.    Medications Reviewed and Updated    Past medical, family, and social histories were reviewed and updated.    Review of Systems negative unless otherwise noted in history of present illness-  ROS    General ROS: negative  Psychological ROS: negative  ENT ROS: negative  Endocrine ROS: Negative  Allergy and Immunology ROS: negative  Cardiovascular ROS: negative  Pulmonary ROS: Negative  Gastrointestinal ROS: negative  Genito-Urinary ROS: negative  Dermatological ROS: negative        Allergic:    Review of patient's allergies indicates:   Allergen Reactions    Adhesive Rash     Pt states she removed a LATEX bandaid and the skin beneath was swollen and red. No other latex causes a reaction.       OBJECTIVE:  BP: 136/74 Pulse: 89    Wt Readings from Last 3 Encounters:   12/23/19 83.5 kg (184 lb 1.4 oz)   11/06/19 81.2 kg (179 lb)   10/28/19 81.3 kg (179 lb 3.7 oz)    Body mass index is 32.61 kg/m².  Previous Blood Pressure Readings :   BP Readings from Last 3  Encounters:   12/23/19 136/74   11/06/19 (!) 144/83   10/28/19 118/66       Physical Exam    GEN: No apparent distress  HEENT: sclera non-icteric, conjunctiva clear  CV:  1+ ankle bilateral peripheral edema regular rate and rhythm. No murmurs  PULM: breathing non-labored  ABD: Obese, protuberant abdomen.  PSYCH: appropriate affect  MSK: able to rise from chair without assistance  SKIN: normal skin turgor    Pertinent Labs Reviewed       ASSESSMENT/PLAN:    Essential hypertension. Further evaluation warranted.  Recommendations as below or as written on After Visit Summary.   -     Hypertension Digital Medicine (HDMP) Enrollment Order  -     Hypertension Digital Medicine (HDMP): Assign Onboarding Questionnaires    Diastolic dysfunction.Condition stable.  Counseling given today on self-care measures. Plan to monitor clinically. Continue current medical plan.         Future Appointments   Date Time Provider Department Center   2/24/2020  1:20 PM Lang Cortez MD [facility]       Lang Cortez  12/23/2019  12:11 PM

## 2019-12-24 PROBLEM — I50.32 CHRONIC DIASTOLIC HEART FAILURE: Status: ACTIVE | Noted: 2019-12-24

## 2020-01-07 ENCOUNTER — TELEPHONE (OUTPATIENT)
Dept: INTERNAL MEDICINE | Facility: CLINIC | Age: 75
End: 2020-01-07

## 2020-01-07 DIAGNOSIS — R68.89 FLU-LIKE SYMPTOMS: Primary | ICD-10-CM

## 2020-01-07 RX ORDER — OSELTAMIVIR PHOSPHATE 75 MG/1
75 CAPSULE ORAL 2 TIMES DAILY
Qty: 10 CAPSULE | Refills: 0 | Status: SHIPPED | OUTPATIENT
Start: 2020-01-07 | End: 2020-01-12

## 2020-01-07 NOTE — TELEPHONE ENCOUNTER
Spoke with pt. Who informed me she has been having cold like symptoms since Sunday. Patient would like to take over the counter cold medicine . I informed her I'd call her back with instructions on what to take once I clarified with  . Patient voices understanding .

## 2020-03-06 ENCOUNTER — PES CALL (OUTPATIENT)
Dept: ADMINISTRATIVE | Facility: CLINIC | Age: 75
End: 2020-03-06

## 2020-03-14 DIAGNOSIS — I10 ESSENTIAL HYPERTENSION: ICD-10-CM

## 2020-03-14 DIAGNOSIS — N18.30 CKD (CHRONIC KIDNEY DISEASE) STAGE 3, GFR 30-59 ML/MIN: ICD-10-CM

## 2020-03-14 DIAGNOSIS — I70.0 AORTIC ATHEROSCLEROSIS: ICD-10-CM

## 2020-03-16 ENCOUNTER — PATIENT MESSAGE (OUTPATIENT)
Dept: INTERNAL MEDICINE | Facility: CLINIC | Age: 75
End: 2020-03-16

## 2020-03-16 RX ORDER — ROSUVASTATIN CALCIUM 5 MG/1
5 TABLET, COATED ORAL DAILY
Qty: 90 TABLET | Refills: 1 | Status: SHIPPED | OUTPATIENT
Start: 2020-03-16 | End: 2020-04-24 | Stop reason: SDUPTHER

## 2020-03-16 RX ORDER — AMLODIPINE BESYLATE 5 MG/1
5 TABLET ORAL DAILY
Qty: 90 TABLET | Refills: 1 | Status: SHIPPED | OUTPATIENT
Start: 2020-03-16 | End: 2020-04-24 | Stop reason: SDUPTHER

## 2020-03-16 RX ORDER — LOSARTAN POTASSIUM 50 MG/1
50 TABLET ORAL DAILY
Qty: 90 TABLET | Refills: 1 | Status: SHIPPED | OUTPATIENT
Start: 2020-03-16 | End: 2020-04-24 | Stop reason: SDUPTHER

## 2020-03-20 ENCOUNTER — TELEPHONE (OUTPATIENT)
Dept: INTERNAL MEDICINE | Facility: CLINIC | Age: 75
End: 2020-03-20

## 2020-03-23 ENCOUNTER — TELEPHONE (OUTPATIENT)
Dept: INTERNAL MEDICINE | Facility: CLINIC | Age: 75
End: 2020-03-23

## 2020-03-23 NOTE — TELEPHONE ENCOUNTER
Home blood pressure numbers as follows.  Patient complying with medication.    111/63 77  121/68  122/74  114/68  139/80  117/64  129/78  121/69  120/74  127/59  113/57

## 2020-03-23 NOTE — TELEPHONE ENCOUNTER
Spoke with patient ,she did agree to have a video chat appointment . She is not registered with our digital medicine . I advised her I will contact her once  approves  appointment change . Patient voices understanding .

## 2020-03-23 NOTE — TELEPHONE ENCOUNTER
----- Message from Lang Cortez MD sent at 3/23/2020 11:36 AM CDT -----  Schedule labs and visit for early May for hypertension    CMP  Lipid  Vit D

## 2020-03-25 ENCOUNTER — TELEPHONE (OUTPATIENT)
Dept: OPTOMETRY | Facility: CLINIC | Age: 75
End: 2020-03-25

## 2020-04-24 DIAGNOSIS — R68.89 FLU-LIKE SYMPTOMS: ICD-10-CM

## 2020-04-24 DIAGNOSIS — N18.30 CKD (CHRONIC KIDNEY DISEASE) STAGE 3, GFR 30-59 ML/MIN: ICD-10-CM

## 2020-04-24 DIAGNOSIS — I70.0 AORTIC ATHEROSCLEROSIS: ICD-10-CM

## 2020-04-24 DIAGNOSIS — I10 ESSENTIAL HYPERTENSION: ICD-10-CM

## 2020-04-27 RX ORDER — ROSUVASTATIN CALCIUM 5 MG/1
5 TABLET, COATED ORAL DAILY
Qty: 90 TABLET | Refills: 0 | Status: SHIPPED | OUTPATIENT
Start: 2020-04-27 | End: 2020-09-04 | Stop reason: SDUPTHER

## 2020-04-27 RX ORDER — LOSARTAN POTASSIUM 50 MG/1
50 TABLET ORAL DAILY
Qty: 90 TABLET | Refills: 0 | Status: SHIPPED | OUTPATIENT
Start: 2020-04-27 | End: 2020-09-04 | Stop reason: SDUPTHER

## 2020-04-27 RX ORDER — AMLODIPINE BESYLATE 5 MG/1
5 TABLET ORAL DAILY
Qty: 90 TABLET | Refills: 0 | Status: SHIPPED | OUTPATIENT
Start: 2020-04-27 | End: 2020-05-07 | Stop reason: SDUPTHER

## 2020-05-04 ENCOUNTER — LAB VISIT (OUTPATIENT)
Dept: LAB | Facility: HOSPITAL | Age: 75
End: 2020-05-04
Attending: INTERNAL MEDICINE
Payer: MEDICARE

## 2020-05-04 DIAGNOSIS — N25.81 HYPERPARATHYROIDISM DUE TO RENAL INSUFFICIENCY: ICD-10-CM

## 2020-05-04 DIAGNOSIS — Z00.00 ANNUAL PHYSICAL EXAM: ICD-10-CM

## 2020-05-04 DIAGNOSIS — N18.30 CKD (CHRONIC KIDNEY DISEASE) STAGE 3, GFR 30-59 ML/MIN: ICD-10-CM

## 2020-05-04 DIAGNOSIS — I10 ESSENTIAL HYPERTENSION: ICD-10-CM

## 2020-05-04 LAB
25(OH)D3+25(OH)D2 SERPL-MCNC: 42 NG/ML (ref 30–96)
ALBUMIN SERPL BCP-MCNC: 3.7 G/DL (ref 3.5–5.2)
ALP SERPL-CCNC: 89 U/L (ref 55–135)
ALT SERPL W/O P-5'-P-CCNC: 27 U/L (ref 10–44)
ANION GAP SERPL CALC-SCNC: 10 MMOL/L (ref 8–16)
AST SERPL-CCNC: 30 U/L (ref 10–40)
BILIRUB SERPL-MCNC: 0.6 MG/DL (ref 0.1–1)
BUN SERPL-MCNC: 31 MG/DL (ref 8–23)
CALCIUM SERPL-MCNC: 9.9 MG/DL (ref 8.7–10.5)
CHLORIDE SERPL-SCNC: 108 MMOL/L (ref 95–110)
CHOLEST SERPL-MCNC: 146 MG/DL (ref 120–199)
CHOLEST/HDLC SERPL: 2.2 {RATIO} (ref 2–5)
CO2 SERPL-SCNC: 24 MMOL/L (ref 23–29)
CREAT SERPL-MCNC: 1.6 MG/DL (ref 0.5–1.4)
EST. GFR  (AFRICAN AMERICAN): 35.8 ML/MIN/1.73 M^2
EST. GFR  (NON AFRICAN AMERICAN): 31.1 ML/MIN/1.73 M^2
GLUCOSE SERPL-MCNC: 105 MG/DL (ref 70–110)
HDLC SERPL-MCNC: 65 MG/DL (ref 40–75)
HDLC SERPL: 44.5 % (ref 20–50)
LDLC SERPL CALC-MCNC: 53 MG/DL (ref 63–159)
NONHDLC SERPL-MCNC: 81 MG/DL
POTASSIUM SERPL-SCNC: 4.4 MMOL/L (ref 3.5–5.1)
PROT SERPL-MCNC: 6.9 G/DL (ref 6–8.4)
SODIUM SERPL-SCNC: 142 MMOL/L (ref 136–145)
TRIGL SERPL-MCNC: 140 MG/DL (ref 30–150)

## 2020-05-04 PROCEDURE — 82306 VITAMIN D 25 HYDROXY: CPT | Mod: HCNC

## 2020-05-04 PROCEDURE — 80061 LIPID PANEL: CPT | Mod: HCNC

## 2020-05-04 PROCEDURE — 80053 COMPREHEN METABOLIC PANEL: CPT | Mod: HCNC

## 2020-05-04 PROCEDURE — 36415 COLL VENOUS BLD VENIPUNCTURE: CPT | Mod: HCNC,PO

## 2020-05-07 ENCOUNTER — OFFICE VISIT (OUTPATIENT)
Dept: INTERNAL MEDICINE | Facility: CLINIC | Age: 75
End: 2020-05-07
Payer: MEDICARE

## 2020-05-07 DIAGNOSIS — N18.30 CKD (CHRONIC KIDNEY DISEASE) STAGE 3, GFR 30-59 ML/MIN: Primary | ICD-10-CM

## 2020-05-07 DIAGNOSIS — I10 ESSENTIAL HYPERTENSION: ICD-10-CM

## 2020-05-07 PROCEDURE — 99442 PR PHYSICIAN TELEPHONE EVALUATION 11-20 MIN: ICD-10-PCS | Mod: HCNC,95,, | Performed by: INTERNAL MEDICINE

## 2020-05-07 PROCEDURE — 99442 PR PHYSICIAN TELEPHONE EVALUATION 11-20 MIN: CPT | Mod: HCNC,95,, | Performed by: INTERNAL MEDICINE

## 2020-05-07 RX ORDER — AMLODIPINE BESYLATE 2.5 MG/1
2.5 TABLET ORAL NIGHTLY
Qty: 90 TABLET | Refills: 0 | Status: SHIPPED | OUTPATIENT
Start: 2020-05-07 | End: 2020-07-14

## 2020-05-07 NOTE — PROGRESS NOTES
The patient location is: home  Visit type: Virtual visit with synchronous audio and video  Total time spent with patient: 20 min    Each patient to whom he or she provides medical services by telemedicine is:  (1) informed of the relationship between the physician and patient and the respective role of any other health care provider with respect to management of the patient; and (2) notified that he or she may decline to receive medical services by telemedicine and may withdraw from such care at any time.       Portions of this note are generated with voice recognition software. Typographical errors may exist.     SUBJECTIVE:    This is a/an 74 y.o. female here for primary care visit for  Chief Complaint   Patient presents with    Hypertension         Blood Pressure:  105/61  126/75  84/50  127/55  120/59  123/69  105/59  115/59  124/62    102/55 (mornings)  132/63    105/61  126/75    Symptoms      Treatments   Losartan 50 mg AM   Amlodpine 5 mg      Home Contacts  Animals  Socially, sit outside, sitting apart, 6-10 feet  Shopping, grocery,      Occupational History       Medications Reviewed and Updated    Past medical, family, and social histories were reviewed and updated.    Review of Systems negative unless otherwise noted in history of present illness-  ROS    General ROS: negative  Psychological ROS: negative  ENT ROS: negative  Endocrine ROS: Negative  Allergy and Immunology ROS: negative  Cardiovascular ROS: negative  Pulmonary ROS: Negative  Gastrointestinal ROS: negative  Genito-Urinary ROS: negative  Musculoskeletal ROS: negative      Allergic:    Review of patient's allergies indicates:   Allergen Reactions    Adhesive Rash     Pt states she removed a LATEX bandaid and the skin beneath was swollen and red. No other latex causes a reaction.       OBJECTIVE:         Wt Readings from Last 3 Encounters:   12/23/19 83.5 kg (184 lb 1.4 oz)   11/06/19 81.2 kg (179 lb)   10/28/19 81.3 kg (179 lb 3.7 oz)     There is no height or weight on file to calculate BMI.  Previous Blood Pressure Readings :   BP Readings from Last 3 Encounters:   12/23/19 136/74   11/06/19 (!) 144/83   10/28/19 118/66       Physical Exam    GEN: No apparent distress    Pertinent Labs Reviewed       ASSESSMENT/PLAN:    CKD (chronic kidney disease) stage 3, GFR 30-59 ml/min.Condition stable.  Counseling given today on self-care measures. Plan to monitor clinically. Continue current medical plan.   -     Microalbumin/creatinine urine ratio; Standing    Essential hypertension.Condition stable.  Counseling given today on self-care measures. Plan to monitor clinically. Continue current medical plan.   -     amLODIPine (NORVASC) 2.5 MG tablet; Take 1 tablet (2.5 mg total) by mouth every evening.  Dispense: 90 tablet; Refill: 0  -     CBC auto differential; Standing; Expected date: 05/07/2020        ASSESSMENT     Determined to remain at home: .Yes      PLAN   Future appointments postponed as indicated  Patient advised to have thermometer and home BP machine for COVID19 preparedness     Patient feeling well   Schedule future visit  During the week contact my office for new medical issues including COVID-19 symptoms [phone]   After hours or weekend told to contact Nurse Advice Line [phone]      Future Appointments   Date Time Provider Department Center   5/25/2020  1:30 PM Neetu Pate NP [facility] C[facility] [place]   6/3/2020 10:00 AM Sunny Holt OD [facility] OPTOM[facility] [place]   9/4/2020  8:00 AM LAB, KARRIE KENH LAB [place]   9/4/2020  8:15 AM SPECIMEN, BOB GARCIA SPECLAB Bob Cortez  5/10/2020  10:51 AM

## 2020-05-26 ENCOUNTER — PES CALL (OUTPATIENT)
Dept: ADMINISTRATIVE | Facility: CLINIC | Age: 75
End: 2020-05-26

## 2020-06-02 ENCOUNTER — PATIENT OUTREACH (OUTPATIENT)
Dept: ADMINISTRATIVE | Facility: OTHER | Age: 75
End: 2020-06-02

## 2020-06-02 DIAGNOSIS — Z12.11 ENCOUNTER FOR FIT (FECAL IMMUNOCHEMICAL TEST) SCREENING: Primary | ICD-10-CM

## 2020-06-03 ENCOUNTER — OFFICE VISIT (OUTPATIENT)
Dept: OPTOMETRY | Facility: CLINIC | Age: 75
End: 2020-06-03
Payer: MEDICARE

## 2020-06-03 DIAGNOSIS — H25.10 NUCLEAR SCLEROSIS, UNSPECIFIED LATERALITY: ICD-10-CM

## 2020-06-03 DIAGNOSIS — H52.7 REFRACTIVE ERROR: ICD-10-CM

## 2020-06-03 DIAGNOSIS — H40.053 BILATERAL OCULAR HYPERTENSION: Primary | ICD-10-CM

## 2020-06-03 PROCEDURE — 92014 PR EYE EXAM, EST PATIENT,COMPREHESV: ICD-10-PCS | Mod: HCNC,S$GLB,, | Performed by: OPTOMETRIST

## 2020-06-03 PROCEDURE — 92014 COMPRE OPH EXAM EST PT 1/>: CPT | Mod: HCNC,S$GLB,, | Performed by: OPTOMETRIST

## 2020-06-03 PROCEDURE — 99999 PR PBB SHADOW E&M-EST. PATIENT-LVL II: CPT | Mod: PBBFAC,HCNC,, | Performed by: OPTOMETRIST

## 2020-06-03 PROCEDURE — 99999 PR PBB SHADOW E&M-EST. PATIENT-LVL II: ICD-10-PCS | Mod: PBBFAC,HCNC,, | Performed by: OPTOMETRIST

## 2020-06-03 NOTE — PROGRESS NOTES
ANNIA FONSECA 05/2019  Patient was due back for 6 month IOP check 11/2019 but is   here for annual today. Patient has seasonal allergies and uses allergy   drops prn.  Glasses about 2 yrs. Old and still seem fine.  Patient hasn't   noticed any vision changes.    Last edited by Karol Wetzel on 6/3/2020 10:31 AM. (History)            Assessment /Plan     For exam results, see Encounter Report.    Bilateral ocular hypertension    Nuclear sclerosis, unspecified laterality    Refractive error      1. IOP normal and nerve eval stable, RTC yearly.   2. Educated pt on presence of cataracts and effects on vision. No surgery at this time. Recheck in one year.  3. New Spec Rx given. Different lens options discussed with patient. RTC 1 year full exam.

## 2020-06-25 ENCOUNTER — OFFICE VISIT (OUTPATIENT)
Dept: HOME HEALTH SERVICES | Facility: CLINIC | Age: 75
End: 2020-06-25
Payer: MEDICARE

## 2020-06-25 VITALS
SYSTOLIC BLOOD PRESSURE: 97 MMHG | DIASTOLIC BLOOD PRESSURE: 61 MMHG | BODY MASS INDEX: 29.88 KG/M2 | HEIGHT: 64 IN | WEIGHT: 175 LBS | HEART RATE: 94 BPM

## 2020-06-25 DIAGNOSIS — I51.89 LEFT VENTRICULAR DIASTOLIC DYSFUNCTION WITH PRESERVED SYSTOLIC FUNCTION: ICD-10-CM

## 2020-06-25 DIAGNOSIS — E66.9 OBESITY (BMI 30.0-34.9): ICD-10-CM

## 2020-06-25 DIAGNOSIS — I70.0 AORTIC ATHEROSCLEROSIS: ICD-10-CM

## 2020-06-25 DIAGNOSIS — N18.30 CKD (CHRONIC KIDNEY DISEASE) STAGE 3, GFR 30-59 ML/MIN: ICD-10-CM

## 2020-06-25 DIAGNOSIS — N25.81 HYPERPARATHYROIDISM DUE TO RENAL INSUFFICIENCY: ICD-10-CM

## 2020-06-25 DIAGNOSIS — I10 ESSENTIAL HYPERTENSION: ICD-10-CM

## 2020-06-25 DIAGNOSIS — Z85.3 HISTORY OF BREAST CANCER: ICD-10-CM

## 2020-06-25 DIAGNOSIS — J84.10 CALCIFIED GRANULOMA OF LUNG: ICD-10-CM

## 2020-06-25 DIAGNOSIS — M85.88 OSTEOPENIA OF SPINE: ICD-10-CM

## 2020-06-25 DIAGNOSIS — T45.1X5A POLYNEUROPATHY FOLLOWING CHEMOTHERAPY: ICD-10-CM

## 2020-06-25 DIAGNOSIS — I50.32 CHRONIC DIASTOLIC HEART FAILURE: ICD-10-CM

## 2020-06-25 DIAGNOSIS — Z00.00 ENCOUNTER FOR PREVENTIVE HEALTH EXAMINATION: Primary | ICD-10-CM

## 2020-06-25 DIAGNOSIS — G62.0 POLYNEUROPATHY FOLLOWING CHEMOTHERAPY: ICD-10-CM

## 2020-06-25 PROBLEM — E78.1 HYPERTRIGLYCERIDEMIA: Status: RESOLVED | Noted: 2017-05-17 | Resolved: 2020-06-25

## 2020-06-25 PROCEDURE — 99499 UNLISTED E&M SERVICE: CPT | Mod: S$GLB,,, | Performed by: NURSE PRACTITIONER

## 2020-06-25 PROCEDURE — G0439 PPPS, SUBSEQ VISIT: HCPCS | Mod: S$GLB,,, | Performed by: NURSE PRACTITIONER

## 2020-06-25 PROCEDURE — 99499 RISK ADDL DX/OHS AUDIT: ICD-10-PCS | Mod: S$GLB,,, | Performed by: NURSE PRACTITIONER

## 2020-06-25 PROCEDURE — 3078F DIAST BP <80 MM HG: CPT | Mod: CPTII,S$GLB,, | Performed by: NURSE PRACTITIONER

## 2020-06-25 PROCEDURE — 3074F PR MOST RECENT SYSTOLIC BLOOD PRESSURE < 130 MM HG: ICD-10-PCS | Mod: CPTII,S$GLB,, | Performed by: NURSE PRACTITIONER

## 2020-06-25 PROCEDURE — 3078F PR MOST RECENT DIASTOLIC BLOOD PRESSURE < 80 MM HG: ICD-10-PCS | Mod: CPTII,S$GLB,, | Performed by: NURSE PRACTITIONER

## 2020-06-25 PROCEDURE — 3074F SYST BP LT 130 MM HG: CPT | Mod: CPTII,S$GLB,, | Performed by: NURSE PRACTITIONER

## 2020-06-25 PROCEDURE — G0439 PR MEDICARE ANNUAL WELLNESS SUBSEQUENT VISIT: ICD-10-PCS | Mod: S$GLB,,, | Performed by: NURSE PRACTITIONER

## 2020-06-25 NOTE — PROGRESS NOTES
"Misa Barajas presented for a  Medicare AWV and comprehensive Health Risk Assessment today. The following components were reviewed and updated:    · Medical history  · Family History  · Social history  · Allergies and Current Medications  · Health Risk Assessment  · Health Maintenance  · Care Team     ** See Completed Assessments for Annual Wellness Visit within the encounter summary.**       The following assessments were completed:  · Living Situation  · CAGE  · Depression Screening  · Timed Get Up and Go  · Whisper Test  · Cognitive Function Screening  ·   ·   ·   · Nutrition Screening  · ADL Screening  · PAQ Screening    Vitals:    06/25/20 1101   BP: 97/61   Pulse: 94   Weight: 79.4 kg (175 lb)   Height: 5' 4" (1.626 m)     Body mass index is 30.04 kg/m².  Physical Exam  Constitutional:       Appearance: Normal appearance.   HENT:      Head: Normocephalic and atraumatic.      Nose: Nose normal.   Eyes:      Extraocular Movements: Extraocular movements intact.   Neck:      Musculoskeletal: Normal range of motion.   Cardiovascular:      Rate and Rhythm: Normal rate and regular rhythm.      Heart sounds: Normal heart sounds.   Pulmonary:      Effort: Pulmonary effort is normal. No respiratory distress.      Breath sounds: Normal breath sounds.   Abdominal:      General: Bowel sounds are normal. There is no distension.      Palpations: Abdomen is soft.   Musculoskeletal: Normal range of motion.         General: Swelling (left ankle swelling) present.   Skin:     General: Skin is warm and dry.   Neurological:      General: No focal deficit present.      Mental Status: She is alert and oriented to person, place, and time.   Psychiatric:         Mood and Affect: Mood normal.         Behavior: Behavior normal.           Diagnoses and health risks identified today and associated recommendations/orders:    1. Encounter for preventive health examination  Assessment completed. Preventive measures reviewed with " patient.    2. Polyneuropathy following chemotherapy  Stable, followed by PCP.    3. Calcified granuloma of lung  Stable, followed by PCP.    4. Aortic atherosclerosis  Stable, followed by PCP.    5. Chronic diastolic heart failure  Stable, followed by PCP.    6. Essential hypertension  Stable, followed by PCP.    7. Left ventricular diastolic dysfunction with preserved systolic function  Stable, followed by PCP.    8. CKD (chronic kidney disease) stage 3, GFR 30-59 ml/min  Stable, followed by PCP.    9. History of breast cancer  Stable, followed by Hematology/Oncology.    10. Hyperparathyroidism due to renal insufficiency  Stable, followed by PCP.    11. Obesity (BMI 30.0-34.9)  Stable, followed by PCP.    12. Osteopenia of spine  Stable, followed by PCP.    Provided Misa with a 5-10 year written screening schedule and personal prevention plan. Recommendations were developed using the USPSTF age appropriate recommendations. Education, counseling, and referrals were provided as needed. After Visit Summary printed and given to patient which includes a list of additional screenings\tests needed.    No follow-ups on file.    Neetu Pate NP  I offered to discuss end of life issues, including information on how to make advance directives that the patient could use to name someone who would make medical decisions on their behalf if they became too ill to make themselves.    ___Patient declined  _X_Patient is interested, I provided paper work and offered to discuss.

## 2020-06-25 NOTE — PATIENT INSTRUCTIONS
Counseling and Referral of Other Preventative  (Italic type indicates deductible and co-insurance are waived)    Patient Name: Misa Barajas  Today's Date: 6/25/2020    Health Maintenance       Date Due Completion Date    Colonoscopy 10/25/1961 ---    LDCT Lung Screen 08/28/2020 (Originally 5/10/2018) 5/10/2017    Mammogram 09/16/2020 9/16/2019    DEXA SCAN 08/30/2022 8/30/2019    Lipid Panel 05/04/2025 5/4/2020    TETANUS VACCINE 08/08/2026 8/8/2016        No orders of the defined types were placed in this encounter.    The following information is provided to all patients.  This information is to help you find resources for any of the problems found today that may be affecting your health:                Living healthy guide: www.[facility].[place].gov      Understanding Diabetes: www.diabetes.org      Eating healthy: www.cdc.gov/healthyweight      CDC home safety checklist: www.cdc.gov/steadi/patient.html      Agency on Aging: www.goea.[place].[facility]      Alcoholics anonymous (AA): www.aa.org      Physical Activity: www.ayesha.nih.gov/tu2ubas      Tobacco use: www.quitwithusla.org

## 2020-08-28 DIAGNOSIS — I10 ESSENTIAL HYPERTENSION: ICD-10-CM

## 2020-08-28 RX ORDER — AMLODIPINE BESYLATE 2.5 MG/1
2.5 TABLET ORAL NIGHTLY
Qty: 30 TABLET | Refills: 0 | Status: SHIPPED | OUTPATIENT
Start: 2020-08-28 | End: 2020-09-04 | Stop reason: SDUPTHER

## 2020-08-28 NOTE — TELEPHONE ENCOUNTER
----- Message from Marycruz Whittaker sent at 8/28/2020  1:31 PM CDT -----  Regarding: Refill request  Contact: Self/ [phone]  Type:  RX Refill Request     Who Called: Self     RX Name and Strength: amLODIPine (NORVASC) 2.5 MG tablet     How is the patient currently taking it? (ex. 1XDay): Sig: TAKE 1 TABLET BY MOUTH EVERY EVENING.     Is this a 30 day or 90 day RX: 90 Day      Preferred Pharmacy with phone number: [facility] - [facility] [address] [phone]       Local or Mail Order: Mail     Ordering Provider: Dr. Carlisle      Would the patient rather a call back or a response via [facility]? Call Back      Best Call Back Number: [phone]     Additional Information: N/A        Type:  RX Refill Request     Who Called: Self     RX Name and Strength: losartan (COZAAR) 50 MG tablet     How is the patient currently taking it? Route: Take 1 tablet (50 mg total) by mouth once daily. - Oral     Is this a 30 day or 90 day RX: 90 Day      Preferred Pharmacy with phone number: [facility] - [place], [place] - [address] [phone]       Local or Mail Order: Mail     Ordering Provider: Dr. Carlisle      Would the patient rather a call back or a response via [facility] [employer] [facility]? Call Back      Best Call Back Number: [phone]     Additional Information: N/A            Type:  RX Refill Request     Who Called: Self     RX Name and Strength: rosuvastatin (CRESTOR) 5 MG tablet     How is the patient currently taking it? Route: Take 1 tablet (5 mg total) by mouth once daily. - Oral     Is this a 30 day or 90 day RX: 90 Day      Preferred Pharmacy with phone number: [facility] - [place], [place] - [address] [phone]       Local or Mail Order: Mail     Ordering Provider: Dr. Carlisle      Would the patient rather a call back or a response via [facility]? Call Back      Best Call Back Number: [phone]     Additional Information: N/A

## 2020-09-04 DIAGNOSIS — I10 ESSENTIAL HYPERTENSION: ICD-10-CM

## 2020-09-04 DIAGNOSIS — I70.0 AORTIC ATHEROSCLEROSIS: ICD-10-CM

## 2020-09-04 DIAGNOSIS — N18.30 CKD (CHRONIC KIDNEY DISEASE) STAGE 3, GFR 30-59 ML/MIN: ICD-10-CM

## 2020-09-04 RX ORDER — ROSUVASTATIN CALCIUM 5 MG/1
5 TABLET, COATED ORAL DAILY
Qty: 90 TABLET | Refills: 0 | Status: SHIPPED | OUTPATIENT
Start: 2020-09-04 | End: 2020-09-09 | Stop reason: SDUPTHER

## 2020-09-04 RX ORDER — LOSARTAN POTASSIUM 50 MG/1
50 TABLET ORAL DAILY
Qty: 90 TABLET | Refills: 0 | Status: SHIPPED | OUTPATIENT
Start: 2020-09-04 | End: 2020-09-09 | Stop reason: SDUPTHER

## 2020-09-04 RX ORDER — AMLODIPINE BESYLATE 2.5 MG/1
2.5 TABLET ORAL NIGHTLY
Qty: 90 TABLET | Refills: 0 | Status: SHIPPED | OUTPATIENT
Start: 2020-09-04 | End: 2020-09-09 | Stop reason: SDUPTHER

## 2020-09-04 NOTE — TELEPHONE ENCOUNTER
"----- Message from Bryce Mace sent at 9/4/2020  8:56 AM CDT -----  Regarding: Rx refill  Contact: Patient [phone]  RX request - refill or new RX.  Is this a refill or new RX:  Refill   RX name and strength: rosuvastatin (CRESTOR) 5 MG tablet  Directions:   Is this a 30 day or 90 day RX:    Pharmacy name and phone # [facility] [address] [place] [address] [phone] (Phone) [phone] (Fax)    Comments:  2 Rx's requesting call to inform has both been sent Dr Lang Cortez Patient     RX request - refill or new RX.  Is this a refill or new RX:  Refill   RX name and strength: losartan (COZAAR) 50 MG tablet  Directions:   Is this a 30 day or 90 day RX:    Pharmacy name and phone # (DON'T enter "on file" or "in chart"):   Comments:      Please call an advise  Thank you    "

## 2020-09-09 ENCOUNTER — OFFICE VISIT (OUTPATIENT)
Dept: INTERNAL MEDICINE | Facility: CLINIC | Age: 75
End: 2020-09-09
Payer: MEDICARE

## 2020-09-09 VITALS
TEMPERATURE: 97 F | SYSTOLIC BLOOD PRESSURE: 120 MMHG | HEIGHT: 64 IN | HEART RATE: 83 BPM | WEIGHT: 182.56 LBS | DIASTOLIC BLOOD PRESSURE: 60 MMHG | BODY MASS INDEX: 31.17 KG/M2 | OXYGEN SATURATION: 95 %

## 2020-09-09 DIAGNOSIS — E55.9 VITAMIN D INSUFFICIENCY: ICD-10-CM

## 2020-09-09 DIAGNOSIS — Z11.59 NEED FOR HEPATITIS C SCREENING TEST: ICD-10-CM

## 2020-09-09 DIAGNOSIS — Z76.89 ENCOUNTER TO ESTABLISH CARE: Primary | ICD-10-CM

## 2020-09-09 DIAGNOSIS — Z12.2 ENCOUNTER FOR SCREENING FOR MALIGNANT NEOPLASM OF RESPIRATORY ORGANS: ICD-10-CM

## 2020-09-09 DIAGNOSIS — F17.211 NICOTINE DEPENDENCE, CIGARETTES, IN REMISSION: ICD-10-CM

## 2020-09-09 DIAGNOSIS — N18.30 CKD (CHRONIC KIDNEY DISEASE) STAGE 3, GFR 30-59 ML/MIN: ICD-10-CM

## 2020-09-09 DIAGNOSIS — I10 ESSENTIAL HYPERTENSION: ICD-10-CM

## 2020-09-09 DIAGNOSIS — Z12.31 SCREENING MAMMOGRAM, ENCOUNTER FOR: ICD-10-CM

## 2020-09-09 DIAGNOSIS — I70.0 AORTIC ATHEROSCLEROSIS: ICD-10-CM

## 2020-09-09 DIAGNOSIS — E78.5 HYPERLIPIDEMIA, UNSPECIFIED HYPERLIPIDEMIA TYPE: ICD-10-CM

## 2020-09-09 PROCEDURE — 99214 OFFICE O/P EST MOD 30 MIN: CPT | Mod: HCNC,S$GLB,, | Performed by: INTERNAL MEDICINE

## 2020-09-09 PROCEDURE — 3078F PR MOST RECENT DIASTOLIC BLOOD PRESSURE < 80 MM HG: ICD-10-PCS | Mod: HCNC,CPTII,S$GLB, | Performed by: INTERNAL MEDICINE

## 2020-09-09 PROCEDURE — 3078F DIAST BP <80 MM HG: CPT | Mod: HCNC,CPTII,S$GLB, | Performed by: INTERNAL MEDICINE

## 2020-09-09 PROCEDURE — 1159F PR MEDICATION LIST DOCUMENTED IN MEDICAL RECORD: ICD-10-PCS | Mod: HCNC,S$GLB,, | Performed by: INTERNAL MEDICINE

## 2020-09-09 PROCEDURE — 1126F AMNT PAIN NOTED NONE PRSNT: CPT | Mod: HCNC,S$GLB,, | Performed by: INTERNAL MEDICINE

## 2020-09-09 PROCEDURE — 99999 PR PBB SHADOW E&M-EST. PATIENT-LVL IV: ICD-10-PCS | Mod: PBBFAC,HCNC,, | Performed by: INTERNAL MEDICINE

## 2020-09-09 PROCEDURE — 99214 PR OFFICE/OUTPT VISIT, EST, LEVL IV, 30-39 MIN: ICD-10-PCS | Mod: HCNC,S$GLB,, | Performed by: INTERNAL MEDICINE

## 2020-09-09 PROCEDURE — 3074F SYST BP LT 130 MM HG: CPT | Mod: HCNC,CPTII,S$GLB, | Performed by: INTERNAL MEDICINE

## 2020-09-09 PROCEDURE — 1101F PR PT FALLS ASSESS DOC 0-1 FALLS W/OUT INJ PAST YR: ICD-10-PCS | Mod: HCNC,CPTII,S$GLB, | Performed by: INTERNAL MEDICINE

## 2020-09-09 PROCEDURE — 3074F PR MOST RECENT SYSTOLIC BLOOD PRESSURE < 130 MM HG: ICD-10-PCS | Mod: HCNC,CPTII,S$GLB, | Performed by: INTERNAL MEDICINE

## 2020-09-09 PROCEDURE — 1126F PR PAIN SEVERITY QUANTIFIED, NO PAIN PRESENT: ICD-10-PCS | Mod: HCNC,S$GLB,, | Performed by: INTERNAL MEDICINE

## 2020-09-09 PROCEDURE — 99999 PR PBB SHADOW E&M-EST. PATIENT-LVL IV: CPT | Mod: PBBFAC,HCNC,, | Performed by: INTERNAL MEDICINE

## 2020-09-09 PROCEDURE — 99499 UNLISTED E&M SERVICE: CPT | Mod: S$GLB,,, | Performed by: INTERNAL MEDICINE

## 2020-09-09 PROCEDURE — 1101F PT FALLS ASSESS-DOCD LE1/YR: CPT | Mod: HCNC,CPTII,S$GLB, | Performed by: INTERNAL MEDICINE

## 2020-09-09 PROCEDURE — 99499 RISK ADDL DX/OHS AUDIT: ICD-10-PCS | Mod: S$GLB,,, | Performed by: INTERNAL MEDICINE

## 2020-09-09 PROCEDURE — 1159F MED LIST DOCD IN RCRD: CPT | Mod: HCNC,S$GLB,, | Performed by: INTERNAL MEDICINE

## 2020-09-09 RX ORDER — AMLODIPINE BESYLATE 2.5 MG/1
2.5 TABLET ORAL NIGHTLY
Qty: 90 TABLET | Refills: 3 | Status: SHIPPED | OUTPATIENT
Start: 2020-09-09 | End: 2021-05-14

## 2020-09-09 RX ORDER — LOSARTAN POTASSIUM 50 MG/1
50 TABLET ORAL DAILY
Qty: 90 TABLET | Refills: 3 | Status: ON HOLD | OUTPATIENT
Start: 2020-09-09 | End: 2021-06-28 | Stop reason: SDUPTHER

## 2020-09-09 RX ORDER — VIT C/E/ZN/COPPR/LUTEIN/ZEAXAN 250MG-90MG
2000 CAPSULE ORAL DAILY
Qty: 90 CAPSULE | Refills: 3 | Status: SHIPPED | OUTPATIENT
Start: 2020-09-09 | End: 2021-12-20

## 2020-09-09 RX ORDER — ROSUVASTATIN CALCIUM 5 MG/1
5 TABLET, COATED ORAL DAILY
Qty: 90 TABLET | Refills: 3 | Status: SHIPPED | OUTPATIENT
Start: 2020-09-09 | End: 2021-09-24

## 2020-09-09 NOTE — PROGRESS NOTES
Patient ID: Misa Barajas is a 74 y.o. female.    Chief Complaint: [facility] (Previous Dr Cortez), Hypertension, and Chronic Kidney Disease    HPI Misa is a 74 y.o. female with hypertension, hyperlipidemia, chronic diastolic congestive heart failure, chronic kidney disease stage 3, vitamin-D insufficiency, aortic atherosclerosis, history of tobacco use, and history of breast cancer who presents to [facility].  She is a former patient of Dr. Cortez.  She has no acute complaints or concerns.  Hypertension:  Patient is compliant with amlodipine 2.5 mg daily and losartan 50 mg p.o. daily.  She takes these medications every morning.  Blood pressure well controlled in clinic today at 120/60.  Hyperlipidemia:  Patient is compliant with Crestor 5 mg p.o. daily.  We reviewed her recent lipid profile from May which shows that her cholesterol is well controlled.  Chronic kidney disease stage 3:  Reviewed recent labs with patient in the clinic.  Patient has had this diagnosis for at least 4 years.  Of note, patient is on a Nephrology about 3 years ago for this diagnosis.  History of tobacco use:  Patient smoked approximately 1 pack per day for 50 years.  She quit smoking 11 years ago.  She had 1 CT scan for lung cancer screening about 3 years ago.  History of breast cancer:  Breast cancer diagnosed and treated in 2002.  Due for repeat mammogram at this time.      Review of Systems   All other systems reviewed and are negative.     Objective:     Vitals:    09/09/20 0939   BP: 120/60   Pulse: 83   Temp: 97 °F (36.1 °C)        Physical Exam  Vitals signs reviewed.   Constitutional:       General: She is not in acute distress.     Appearance: Normal appearance. She is well-developed. She is obese. She is not ill-appearing or diaphoretic.   HENT:      Head: Normocephalic and atraumatic.      Right Ear: External ear normal.      Left Ear: External ear normal.      Nose: Nose normal.   Eyes:      General: No scleral  icterus.        Right eye: No discharge.         Left eye: No discharge.      Extraocular Movements: Extraocular movements intact.      Conjunctiva/sclera: Conjunctivae normal.   Cardiovascular:      Rate and Rhythm: Normal rate and regular rhythm.      Heart sounds: Normal heart sounds. No murmur. No friction rub. No gallop.    Pulmonary:      Effort: Pulmonary effort is normal. No respiratory distress.      Breath sounds: Normal breath sounds. No stridor. No wheezing, rhonchi or rales.   Skin:     General: Skin is warm and dry.   Neurological:      General: No focal deficit present.      Mental Status: She is alert and oriented to person, place, and time. Mental status is at baseline.   Psychiatric:         Mood and Affect: Mood normal.         Behavior: Behavior normal.         Thought Content: Thought content normal.         Judgment: Judgment normal.         Assessment:       1. Encounter to establish care    2. Essential hypertension Well controlled   3. Aortic atherosclerosis Chronic   4. CKD (chronic kidney disease) stage 3, GFR 30-59 ml/min Chronic   5. Vitamin D insufficiency Well controlled   6. Screening mammogram, encounter for    7. Encounter for screening for malignant neoplasm of respiratory organs    8. Nicotine dependence, cigarettes, in remission  Chronic   9. Need for hepatitis C screening test    10. Hyperlipidemia, unspecified hyperlipidemia type Well controlled       Plan:         Encounter to establish care    Essential hypertension  Comments:  Continue current medications  Orders:  -     amLODIPine (NORVASC) 2.5 MG tablet; Take 1 tablet (2.5 mg total) by mouth every evening.  Dispense: 90 tablet; Refill: 3  -     losartan (COZAAR) 50 MG tablet; Take 1 tablet (50 mg total) by mouth once daily.  Dispense: 90 tablet; Refill: 3  -     rosuvastatin (CRESTOR) 5 MG tablet; Take 1 tablet (5 mg total) by mouth once daily.  Dispense: 90 tablet; Refill: 3  -     Comprehensive metabolic panel; Future;  Expected date: 12/09/2020    Aortic atherosclerosis  Comments:  Continue aspirin and statin.  Orders:  -     rosuvastatin (CRESTOR) 5 MG tablet; Take 1 tablet (5 mg total) by mouth once daily.  Dispense: 90 tablet; Refill: 3    CKD (chronic kidney disease) stage 3, GFR 30-59 ml/min  Comments:  Avoid NSAID medications; stay well hydrated; control blood pressure.  Orders:  -     rosuvastatin (CRESTOR) 5 MG tablet; Take 1 tablet (5 mg total) by mouth once daily.  Dispense: 90 tablet; Refill: 3    Vitamin D insufficiency  Comments:  Continue current medication  Orders:  -     cholecalciferol, vitamin D3, (VITAMIN D3) 25 mcg (1,000 unit) capsule; Take 2 capsules (2,000 Units total) by mouth once daily.  Dispense: 90 capsule; Refill: 3    Screening mammogram, encounter for  -     Mammo Digital Screening Bilat; Future; Expected date: 09/09/2020    Encounter for screening for malignant neoplasm of respiratory organs  -     CT Chest Lung Screening Low Dose; Future; Expected date: 09/09/2020    Nicotine dependence, cigarettes, in remission   -     CT Chest Lung Screening Low Dose; Future; Expected date: 09/09/2020    Need for hepatitis C screening test  -     Hepatitis C Antibody; Future; Expected date: 12/09/2020    Hyperlipidemia, unspecified hyperlipidemia type  Comments:  Continue current medication        RTC 3-4 months, f/u HTN    Warning signs discussed, patient to call with any further issues or worsening of symptoms.       Parts of the above note were dictated using a voice dictation software. Please excuse any grammatical or typographical errors.

## 2020-09-15 ENCOUNTER — HOSPITAL ENCOUNTER (OUTPATIENT)
Dept: RADIOLOGY | Facility: HOSPITAL | Age: 75
Discharge: HOME | End: 2020-09-15
Attending: INTERNAL MEDICINE
Payer: MEDICARE

## 2020-09-15 DIAGNOSIS — Z12.2 ENCOUNTER FOR SCREENING FOR MALIGNANT NEOPLASM OF RESPIRATORY ORGANS: ICD-10-CM

## 2020-09-15 DIAGNOSIS — Z12.31 SCREENING MAMMOGRAM, ENCOUNTER FOR: ICD-10-CM

## 2020-09-15 DIAGNOSIS — R93.429 ABNORMAL CT SCAN, KIDNEY: Primary | ICD-10-CM

## 2020-09-15 DIAGNOSIS — F17.211 NICOTINE DEPENDENCE, CIGARETTES, IN REMISSION: ICD-10-CM

## 2020-09-15 PROCEDURE — 77067 MAMMO DIGITAL SCREENING RIGHT WITH TOMO: ICD-10-PCS | Mod: 26,HCNC,, | Performed by: RADIOLOGY

## 2020-09-15 PROCEDURE — 77067 SCR MAMMO BI INCL CAD: CPT | Mod: TC,HCNC

## 2020-09-15 PROCEDURE — G0297 LDCT FOR LUNG CA SCREEN: HCPCS | Mod: 26,HCNC,, | Performed by: RADIOLOGY

## 2020-09-15 PROCEDURE — G0297 CT CHEST LUNG SCREENING LOW DOSE: ICD-10-PCS | Mod: 26,HCNC,, | Performed by: RADIOLOGY

## 2020-09-15 PROCEDURE — 77067 SCR MAMMO BI INCL CAD: CPT | Mod: 26,HCNC,, | Performed by: RADIOLOGY

## 2020-09-15 PROCEDURE — 77063 BREAST TOMOSYNTHESIS BI: CPT | Mod: 26,HCNC,, | Performed by: RADIOLOGY

## 2020-09-15 PROCEDURE — G0297 LDCT FOR LUNG CA SCREEN: HCPCS | Mod: TC,HCNC

## 2020-09-15 PROCEDURE — 77063 MAMMO DIGITAL SCREENING RIGHT WITH TOMO: ICD-10-PCS | Mod: 26,HCNC,, | Performed by: RADIOLOGY

## 2020-09-23 ENCOUNTER — HOSPITAL ENCOUNTER (OUTPATIENT)
Dept: RADIOLOGY | Facility: HOSPITAL | Age: 75
Discharge: HOME | End: 2020-09-23
Attending: INTERNAL MEDICINE
Payer: MEDICARE

## 2020-09-23 DIAGNOSIS — R93.429 ABNORMAL CT SCAN, KIDNEY: ICD-10-CM

## 2020-09-23 PROCEDURE — 76770 US EXAM ABDO BACK WALL COMP: CPT | Mod: TC,HCNC

## 2020-09-23 PROCEDURE — 76770 US EXAM ABDO BACK WALL COMP: CPT | Mod: 26,HCNC,, | Performed by: RADIOLOGY

## 2020-09-23 PROCEDURE — 76770 US RETROPERITONEAL COMPLETE: ICD-10-PCS | Mod: 26,HCNC,, | Performed by: RADIOLOGY

## 2020-09-29 ENCOUNTER — PATIENT MESSAGE (OUTPATIENT)
Dept: OTHER | Facility: OTHER | Age: 75
End: 2020-09-29

## 2020-10-05 ENCOUNTER — PATIENT MESSAGE (OUTPATIENT)
Dept: INTERNAL MEDICINE | Facility: CLINIC | Age: 75
End: 2020-10-05

## 2020-10-05 DIAGNOSIS — N28.89 KIDNEY MASS: ICD-10-CM

## 2020-10-05 DIAGNOSIS — N20.0 KIDNEY STONES: Primary | ICD-10-CM

## 2020-10-06 ENCOUNTER — TELEPHONE (OUTPATIENT)
Dept: INTERNAL MEDICINE | Facility: CLINIC | Age: 75
End: 2020-10-06

## 2020-10-06 NOTE — TELEPHONE ENCOUNTER
Spoke with patient and notified her of message from Dr. Carlisle. Patient voices understanding. Would like to see Dr. Knutson.

## 2020-10-06 NOTE — TELEPHONE ENCOUNTER
----- Message from Celia Carlisle MD sent at 10/5/2020  4:44 PM CDT -----  Please call the patient. Upon review of her labs, I do not feel comfortable obtaining a CT scan of her abdomen with contrast (as was recommended by radiology) due to her current kidney function. I recommend she follow up with nephrology (the kidney specialists) to get their impression regarding her kidney imaging. Please schedule her with nephrology.     Thanks!

## 2020-10-07 ENCOUNTER — TELEPHONE (OUTPATIENT)
Dept: NEPHROLOGY | Facility: CLINIC | Age: 75
End: 2020-10-07

## 2020-10-13 ENCOUNTER — PATIENT OUTREACH (OUTPATIENT)
Dept: ADMINISTRATIVE | Facility: OTHER | Age: 75
End: 2020-10-13

## 2020-10-14 ENCOUNTER — OFFICE VISIT (OUTPATIENT)
Dept: NEPHROLOGY | Facility: CLINIC | Age: 75
End: 2020-10-14
Payer: MEDICARE

## 2020-10-14 VITALS
SYSTOLIC BLOOD PRESSURE: 144 MMHG | BODY MASS INDEX: 30.65 KG/M2 | DIASTOLIC BLOOD PRESSURE: 76 MMHG | HEART RATE: 87 BPM | OXYGEN SATURATION: 95 % | WEIGHT: 178.56 LBS

## 2020-10-14 DIAGNOSIS — N20.0 KIDNEY STONES: ICD-10-CM

## 2020-10-14 DIAGNOSIS — N28.89 KIDNEY MASS: ICD-10-CM

## 2020-10-14 PROCEDURE — 3077F PR MOST RECENT SYSTOLIC BLOOD PRESSURE >= 140 MM HG: ICD-10-PCS | Mod: HCNC,CPTII,S$GLB, | Performed by: INTERNAL MEDICINE

## 2020-10-14 PROCEDURE — 1126F AMNT PAIN NOTED NONE PRSNT: CPT | Mod: HCNC,S$GLB,, | Performed by: INTERNAL MEDICINE

## 2020-10-14 PROCEDURE — 99999 PR PBB SHADOW E&M-EST. PATIENT-LVL IV: CPT | Mod: PBBFAC,HCNC,, | Performed by: INTERNAL MEDICINE

## 2020-10-14 PROCEDURE — 3077F SYST BP >= 140 MM HG: CPT | Mod: HCNC,CPTII,S$GLB, | Performed by: INTERNAL MEDICINE

## 2020-10-14 PROCEDURE — 3078F DIAST BP <80 MM HG: CPT | Mod: HCNC,CPTII,S$GLB, | Performed by: INTERNAL MEDICINE

## 2020-10-14 PROCEDURE — 99203 OFFICE O/P NEW LOW 30 MIN: CPT | Mod: HCNC,S$GLB,, | Performed by: INTERNAL MEDICINE

## 2020-10-14 PROCEDURE — 1101F PT FALLS ASSESS-DOCD LE1/YR: CPT | Mod: HCNC,CPTII,S$GLB, | Performed by: INTERNAL MEDICINE

## 2020-10-14 PROCEDURE — 99999 PR PBB SHADOW E&M-EST. PATIENT-LVL IV: ICD-10-PCS | Mod: PBBFAC,HCNC,, | Performed by: INTERNAL MEDICINE

## 2020-10-14 PROCEDURE — 1159F PR MEDICATION LIST DOCUMENTED IN MEDICAL RECORD: ICD-10-PCS | Mod: HCNC,S$GLB,, | Performed by: INTERNAL MEDICINE

## 2020-10-14 PROCEDURE — 1126F PR PAIN SEVERITY QUANTIFIED, NO PAIN PRESENT: ICD-10-PCS | Mod: HCNC,S$GLB,, | Performed by: INTERNAL MEDICINE

## 2020-10-14 PROCEDURE — 1159F MED LIST DOCD IN RCRD: CPT | Mod: HCNC,S$GLB,, | Performed by: INTERNAL MEDICINE

## 2020-10-14 PROCEDURE — 1101F PR PT FALLS ASSESS DOC 0-1 FALLS W/OUT INJ PAST YR: ICD-10-PCS | Mod: HCNC,CPTII,S$GLB, | Performed by: INTERNAL MEDICINE

## 2020-10-14 PROCEDURE — 3078F PR MOST RECENT DIASTOLIC BLOOD PRESSURE < 80 MM HG: ICD-10-PCS | Mod: HCNC,CPTII,S$GLB, | Performed by: INTERNAL MEDICINE

## 2020-10-14 PROCEDURE — 99203 PR OFFICE/OUTPT VISIT, NEW, LEVL III, 30-44 MIN: ICD-10-PCS | Mod: HCNC,S$GLB,, | Performed by: INTERNAL MEDICINE

## 2020-10-14 NOTE — LETTER
October 14, 2020      Celia Carlisle MD  [address] [address]           [facility] - Nephrology 5th Fl  [address]  [facility] [address]  Phone: [phone]  Fax: [phone]          Patient: Misa Barajas   MR Number: [id number]   YOB: 1945   Date of Visit: 10/14/2020       Dear Dr. Celia Carlisle:    Thank you for referring Misa Barajas to me for evaluation. Attached you will find relevant portions of my assessment and plan of care.    If you have questions, please do not hesitate to call me. I look forward to following Misa Barajas along with you.    Sincerely,    Ming Beard MD    Enclosure  CC:  No Recipients    If you would like to receive this communication electronically, please contact [email or web address] or [phone] to request more information on [employer] access.    For providers and/or their staff who would like to refer a patient to [facility], please contact us through our one-stop-shop provider referral line, [facility], at [phone].    If you feel you have received this communication in error or would no longer like to receive these types of communications, please e-mail [email or web address]

## 2020-10-14 NOTE — PROGRESS NOTES
REASON FOR CONSULT: ckd    REFERRING PHYSICIAN: Celia Carlisle MD      HISTORY OF PRESENT ILLNESS: 74 y.o. female who is new to me  has a past medical history of Aortic atherosclerosis, Arthritis, Breast cancer (2002), Bronchitis, Cataracts, bilateral, Chronic diastolic heart failure (12/24/2019), Chronic kidney disease, stage 3, History of chemotherapy, Hyperlipidemia (11/20/2016), Hypertension, Osteoporosis, Renal calculi, and Vitamin D insufficiency. patient was referred here for ckd management   The patient denies taking NSAIDs or new antibiotics, recreational drugs, recent episode of dehydration, diarrhea, nausea or vomiting, acute illness, hospitalization or exposure to IV radiocontrast.     ROS:  General: negative for chills, or fatigue  Respiratory: No cough, shortness of breath, or wheezing  Cardiovascular: No chest pain or dyspnea   Gastrointestinal: No abdominal pain, change in bowel habits  Genito-Urinary: No dysuria, trouble voiding, or hematuria  Musculoskeletal: ROS: negative for - joint pain, joint stiffness, joint swelling, muscle pain or muscular weakness      PAST MEDICAL HISTORY:  Past Medical History:   Diagnosis Date    Aortic atherosclerosis     Arthritis     knee joint pain    Breast cancer 2002    left breast & lymph nodes-s/p sx with chemo    Bronchitis     with flu-2/2014    Cataracts, bilateral     Chronic diastolic heart failure 12/24/2019    Chronic kidney disease, stage 3     History of chemotherapy     last treatment 12/2002 (had 8 treatments)    Hyperlipidemia 11/20/2016    Hypertension     Osteoporosis     Renal calculi     hematuria    Vitamin D insufficiency        PAST SURGICAL HISTORY:  Past Surgical History:   Procedure Laterality Date    BREAST BIOPSY Left 2002    core bx, +    BREAST BIOPSY Right 2018    core    BREAST BIOPSY Right 2019    BREAST LUMPECTOMY Left 2002    LITHOTRIPSY      MASTECTOMY Left 06/2002    left-& lymph node dissection     ureteroscopy Bilateral     6.14       FAMILY HISTORY:   Family History   Problem Relation Age of Onset    Bone cancer Mother     Breast cancer Mother     Arthritis Mother         Breast Cancer    Breast cancer Sister     Cancer Father         Asbestos cancer    No Known Problems Brother     Fibroids Daughter     No Known Problems Daughter     Arthritis Sister         Breast Cancer    Anesthesia problems Neg Hx     Glaucoma Neg Hx        SOCIAL HISTORY:  Social History     Socioeconomic History    Marital status:      Spouse name: Not on file    Number of children: Not on file    Years of education: Not on file    Highest education level: Not on file   Occupational History    Not on file   Social Needs    Financial resource strain: Not very hard    Food insecurity     Worry: Never true     Inability: Never true    Transportation needs     Medical: No     Non-medical: No   Tobacco Use    Smoking status: Former Smoker     Packs/day: 1.00     Years: 50.00     Pack years: 50.00     Types: Cigarettes     Quit date: 2009     Years since quittin.4    Smokeless tobacco: Never Used   Substance and Sexual Activity    Alcohol use: No     Frequency: Never     Drinks per session: Patient refused     Binge frequency: Never    Drug use: No    Sexual activity: Not Currently     Partners: Male     Birth control/protection: Partner-Vasectomy   Lifestyle    Physical activity     Days per week: 3 days     Minutes per session: 30 min    Stress: Not at all   Relationships    Social connections     Talks on phone: More than three times a week     Gets together: Three times a week     Attends Sikhism service: Not on file     Active member of club or organization: No     Attends meetings of clubs or organizations: Patient refused     Relationship status:    Other Topics Concern    Not on file   Social History Narrative    Not on file       ALLERGIES:  Review of patient's allergies  indicates:   Allergen Reactions    Adhesive Rash     Pt states she removed a LATEX bandaid and the skin beneath was swollen and red. No other latex causes a reaction.       MEDICATIONS:    Current Outpatient Medications:     amLODIPine (NORVASC) 2.5 MG tablet, Take 1 tablet (2.5 mg total) by mouth every evening., Disp: 90 tablet, Rfl: 3    aspirin (ECOTRIN) 81 MG EC tablet, Take 81 mg by mouth once daily., Disp: , Rfl:     biotin 1 mg tablet, Take 1,000 mcg by mouth 3 (three) times daily., Disp: , Rfl:     cholecalciferol, vitamin D3, (VITAMIN D3) 25 mcg (1,000 unit) capsule, Take 2 capsules (2,000 Units total) by mouth once daily., Disp: 90 capsule, Rfl: 3    losartan (COZAAR) 50 MG tablet, Take 1 tablet (50 mg total) by mouth once daily., Disp: 90 tablet, Rfl: 3    rosuvastatin (CRESTOR) 5 MG tablet, Take 1 tablet (5 mg total) by mouth once daily., Disp: 90 tablet, Rfl: 3    fish oil-omega-3 fatty acids 300-1,000 mg capsule, Take 2 g by mouth once daily., Disp: , Rfl:    Medication List with Changes/Refills   Current Medications    AMLODIPINE (NORVASC) 2.5 MG TABLET    Take 1 tablet (2.5 mg total) by mouth every evening.    ASPIRIN (ECOTRIN) 81 MG EC TABLET    Take 81 mg by mouth once daily.    BIOTIN 1 MG TABLET    Take 1,000 mcg by mouth 3 (three) times daily.    CHOLECALCIFEROL, VITAMIN D3, (VITAMIN D3) 25 MCG (1,000 UNIT) CAPSULE    Take 2 capsules (2,000 Units total) by mouth once daily.    FISH OIL-OMEGA-3 FATTY ACIDS 300-1,000 MG CAPSULE    Take 2 g by mouth once daily.    LOSARTAN (COZAAR) 50 MG TABLET    Take 1 tablet (50 mg total) by mouth once daily.    ROSUVASTATIN (CRESTOR) 5 MG TABLET    Take 1 tablet (5 mg total) by mouth once daily.        PHYSICAL EXAM:  Pulse 87   Wt 81 kg (178 lb 9.2 oz)   SpO2 (!) 94%   BMI 30.65 kg/m²     General: No distress, No fever or chills  Lungs:Clear to auscultation bilaterally, No Crackles  Heart: Regular rate and rhythm, no murmur, gallops or  rubs  Abdomen: Soft, no tenderness, bowel sounds normal  Extremities: Atraumatic, no edema in LE        LABS:  Lab Results   Component Value Date    CREATININE 1.6 (H) 05/04/2020       Prot/Creat Ratio, Ur   Date Value Ref Range Status   12/04/2017 0.10 0.00 - 0.20 Final   11/09/2016 0.14 0.00 - 0.20 Final   06/23/2016 0.32 (H) 0.00 - 0.20 Final       Lab Results   Component Value Date     05/04/2020    K 4.4 05/04/2020    CO2 24 05/04/2020       last PTH   Lab Results   Component Value Date    .0 (H) 12/04/2017    CALCIUM 9.9 05/04/2020    CAION 1.24 12/08/2014    PHOS 3.0 02/14/2018    PHOS 3.0 02/14/2018       Lab Results   Component Value Date    HGB 14.2 07/20/2017        No results found for: HGBA1C    Lab Results   Component Value Date    LDLCALC 53.0 (L) 05/04/2020           ASSESSMENT:  1-CKD stage IIIb  2-HTN   3-remote h/o kidney stone         PLAN:  -[facility] function for the last 10 years , will repeat it today with UA  -controlled BP , Continue current BP meds regimen  -Avoid NSAIDs intake      RTC in 6m      Thanks for allowing me to participate in the care of this patient.     1:07 PM    JOHN BELCHER MD  NEPHROLOGY ATTENDING

## 2020-11-18 ENCOUNTER — TELEPHONE (OUTPATIENT)
Dept: FAMILY MEDICINE | Facility: CLINIC | Age: 75
End: 2020-11-18

## 2020-12-09 ENCOUNTER — LAB VISIT (OUTPATIENT)
Dept: LAB | Facility: HOSPITAL | Age: 75
End: 2020-12-09
Attending: INTERNAL MEDICINE
Payer: MEDICARE

## 2020-12-09 DIAGNOSIS — I10 ESSENTIAL HYPERTENSION: ICD-10-CM

## 2020-12-09 DIAGNOSIS — Z11.59 NEED FOR HEPATITIS C SCREENING TEST: ICD-10-CM

## 2020-12-09 LAB
ALBUMIN SERPL BCP-MCNC: 3.6 G/DL (ref 3.5–5.2)
ALP SERPL-CCNC: 96 U/L (ref 55–135)
ALT SERPL W/O P-5'-P-CCNC: 26 U/L (ref 10–44)
ANION GAP SERPL CALC-SCNC: 12 MMOL/L (ref 8–16)
AST SERPL-CCNC: 33 U/L (ref 10–40)
BILIRUB SERPL-MCNC: 0.8 MG/DL (ref 0.1–1)
BUN SERPL-MCNC: 20 MG/DL (ref 8–23)
CALCIUM SERPL-MCNC: 9.9 MG/DL (ref 8.7–10.5)
CHLORIDE SERPL-SCNC: 107 MMOL/L (ref 95–110)
CO2 SERPL-SCNC: 22 MMOL/L (ref 23–29)
CREAT SERPL-MCNC: 1.4 MG/DL (ref 0.5–1.4)
EST. GFR  (AFRICAN AMERICAN): 41.8 ML/MIN/1.73 M^2
EST. GFR  (NON AFRICAN AMERICAN): 36.3 ML/MIN/1.73 M^2
GLUCOSE SERPL-MCNC: 97 MG/DL (ref 70–110)
POTASSIUM SERPL-SCNC: 4.2 MMOL/L (ref 3.5–5.1)
PROT SERPL-MCNC: 6.7 G/DL (ref 6–8.4)
SODIUM SERPL-SCNC: 141 MMOL/L (ref 136–145)

## 2020-12-09 PROCEDURE — 36415 COLL VENOUS BLD VENIPUNCTURE: CPT | Mod: HCNC,PO

## 2020-12-09 PROCEDURE — 86803 HEPATITIS C AB TEST: CPT | Mod: HCNC

## 2020-12-09 PROCEDURE — 80053 COMPREHEN METABOLIC PANEL: CPT | Mod: HCNC

## 2020-12-10 LAB — HCV AB SERPL QL IA: NEGATIVE

## 2020-12-11 ENCOUNTER — PATIENT MESSAGE (OUTPATIENT)
Dept: OTHER | Facility: OTHER | Age: 75
End: 2020-12-11

## 2020-12-17 ENCOUNTER — OFFICE VISIT (OUTPATIENT)
Dept: INTERNAL MEDICINE | Facility: CLINIC | Age: 75
End: 2020-12-17
Payer: MEDICARE

## 2020-12-17 VITALS
OXYGEN SATURATION: 95 % | TEMPERATURE: 97 F | BODY MASS INDEX: 31.07 KG/M2 | SYSTOLIC BLOOD PRESSURE: 122 MMHG | DIASTOLIC BLOOD PRESSURE: 64 MMHG | HEART RATE: 90 BPM | WEIGHT: 181 LBS

## 2020-12-17 DIAGNOSIS — I70.0 AORTIC ATHEROSCLEROSIS: ICD-10-CM

## 2020-12-17 DIAGNOSIS — I51.89 LEFT VENTRICULAR DIASTOLIC DYSFUNCTION WITH PRESERVED SYSTOLIC FUNCTION: ICD-10-CM

## 2020-12-17 DIAGNOSIS — N28.1 RENAL CYST: ICD-10-CM

## 2020-12-17 DIAGNOSIS — Z00.00 ANNUAL PHYSICAL EXAM: ICD-10-CM

## 2020-12-17 DIAGNOSIS — E78.5 HYPERLIPIDEMIA, UNSPECIFIED HYPERLIPIDEMIA TYPE: ICD-10-CM

## 2020-12-17 DIAGNOSIS — E66.9 OBESITY (BMI 30-39.9): ICD-10-CM

## 2020-12-17 DIAGNOSIS — N18.32 STAGE 3B CHRONIC KIDNEY DISEASE: ICD-10-CM

## 2020-12-17 PROCEDURE — 3074F SYST BP LT 130 MM HG: CPT | Mod: HCNC,CPTII,S$GLB, | Performed by: INTERNAL MEDICINE

## 2020-12-17 PROCEDURE — 3078F DIAST BP <80 MM HG: CPT | Mod: HCNC,CPTII,S$GLB, | Performed by: INTERNAL MEDICINE

## 2020-12-17 PROCEDURE — 1159F PR MEDICATION LIST DOCUMENTED IN MEDICAL RECORD: ICD-10-PCS | Mod: HCNC,S$GLB,, | Performed by: INTERNAL MEDICINE

## 2020-12-17 PROCEDURE — 99999 PR PBB SHADOW E&M-EST. PATIENT-LVL III: ICD-10-PCS | Mod: PBBFAC,HCNC,, | Performed by: INTERNAL MEDICINE

## 2020-12-17 PROCEDURE — 3078F PR MOST RECENT DIASTOLIC BLOOD PRESSURE < 80 MM HG: ICD-10-PCS | Mod: HCNC,CPTII,S$GLB, | Performed by: INTERNAL MEDICINE

## 2020-12-17 PROCEDURE — 99999 PR PBB SHADOW E&M-EST. PATIENT-LVL III: CPT | Mod: PBBFAC,HCNC,, | Performed by: INTERNAL MEDICINE

## 2020-12-17 PROCEDURE — 1159F MED LIST DOCD IN RCRD: CPT | Mod: HCNC,S$GLB,, | Performed by: INTERNAL MEDICINE

## 2020-12-17 PROCEDURE — 1126F PR PAIN SEVERITY QUANTIFIED, NO PAIN PRESENT: ICD-10-PCS | Mod: HCNC,S$GLB,, | Performed by: INTERNAL MEDICINE

## 2020-12-17 PROCEDURE — 99214 PR OFFICE/OUTPT VISIT, EST, LEVL IV, 30-39 MIN: ICD-10-PCS | Mod: HCNC,S$GLB,, | Performed by: INTERNAL MEDICINE

## 2020-12-17 PROCEDURE — 1126F AMNT PAIN NOTED NONE PRSNT: CPT | Mod: HCNC,S$GLB,, | Performed by: INTERNAL MEDICINE

## 2020-12-17 PROCEDURE — 3074F PR MOST RECENT SYSTOLIC BLOOD PRESSURE < 130 MM HG: ICD-10-PCS | Mod: HCNC,CPTII,S$GLB, | Performed by: INTERNAL MEDICINE

## 2020-12-17 PROCEDURE — 99214 OFFICE O/P EST MOD 30 MIN: CPT | Mod: HCNC,S$GLB,, | Performed by: INTERNAL MEDICINE

## 2020-12-17 NOTE — PROGRESS NOTES
Patient ID: Misa Barajas is a 75 y.o. female.    Chief Complaint: Follow-up    HPI Misa is a 75-year-old female with hypertension, hyperlipidemia, chronic diastolic congestive heart failure, chronic kidney disease stage 3, vitamin-D insufficiency, aortic atherosclerosis, history of tobacco use, and history of breast cancer who presents for routine follow-up of medical conditions.  We discussed at length today the finding on the right kidney.  It has been present for some time.  It was present on imaging from 2017, but has doubled in size since that time.  It most likely represents a benign cyst.  Will message her prior urologist to get her opinion on the matter.  Blood pressure is well controlled today and she is compliant with her blood pressure medications.  We reviewed her labs in regards to renal function.  Her kidney function is stable at chronic kidney disease stage 3.  She recently saw Nephrology.  No acute complaints or concerns today.    Review of Systems   All other systems reviewed and are negative.      Objective:     Vitals:    12/17/20 1126   BP: 122/64   Pulse: 90   Temp: 97.2 °F (36.2 °C)        Physical Exam  Vitals signs reviewed.   Constitutional:       General: She is not in acute distress.     Appearance: Normal appearance. She is well-developed. She is not ill-appearing, toxic-appearing or diaphoretic.   HENT:      Head: Normocephalic and atraumatic.      Right Ear: External ear normal.      Left Ear: External ear normal.      Nose: Nose normal.   Eyes:      General: No scleral icterus.        Right eye: No discharge.         Left eye: No discharge.      Extraocular Movements: Extraocular movements intact.      Conjunctiva/sclera: Conjunctivae normal.   Cardiovascular:      Rate and Rhythm: Normal rate and regular rhythm.      Heart sounds: Normal heart sounds. No murmur. No friction rub. No gallop.    Pulmonary:      Effort: Pulmonary effort is normal. No respiratory distress.       Breath sounds: Normal breath sounds. No stridor. No wheezing, rhonchi or rales.   Skin:     General: Skin is warm and dry.   Neurological:      General: No focal deficit present.      Mental Status: She is alert and oriented to person, place, and time. Mental status is at baseline.   Psychiatric:         Mood and Affect: Mood normal.         Behavior: Behavior normal.         Thought Content: Thought content normal.         Judgment: Judgment normal.         Assessment:       1. Hyperlipidemia, unspecified hyperlipidemia type Well controlled   2. Renal cyst Chronic   3. Obesity (BMI 30-39.9) Chronic   4. Left ventricular diastolic dysfunction with preserved systolic function Well controlled   5. Aortic atherosclerosis Chronic   6. Stage 3b chronic kidney disease Chronic   7. Annual physical exam        Plan:         Hyperlipidemia, unspecified hyperlipidemia type  Comments:  Continue current medication  Orders:  -     Lipid Panel; Future; Expected date: 06/17/2021    Renal cyst  Comments:  Will message her urologist regarding this finding and discuss next steps.    Obesity (BMI 30-39.9)  -     CBC Auto Differential; Future; Expected date: 06/17/2021    Left ventricular diastolic dysfunction with preserved systolic function  Comments:  Continue current medications    Aortic atherosclerosis  Comments:  Continue aspirin and statin    Stage 3b chronic kidney disease  Comments:  Stay well hydrated.  Avoid NSAID medications.    Annual physical exam  -     Comprehensive Metabolic Panel; Future; Expected date: 06/17/2021        RTC 6 months for annual exam    Warning signs discussed, patient to call with any further issues or worsening of symptoms.       Parts of the above note were dictated using a voice dictation software. Please excuse any grammatical or typographical errors.

## 2020-12-24 ENCOUNTER — TELEPHONE (OUTPATIENT)
Dept: UROLOGY | Facility: CLINIC | Age: 75
End: 2020-12-24

## 2020-12-24 DIAGNOSIS — N28.89 RENAL MASS: Primary | ICD-10-CM

## 2020-12-24 NOTE — TELEPHONE ENCOUNTER
Please schedule patient for dedicated renal ultrasound to get a better look at the spot on her kidney.

## 2020-12-30 ENCOUNTER — HOSPITAL ENCOUNTER (OUTPATIENT)
Dept: RADIOLOGY | Facility: HOSPITAL | Age: 75
Discharge: HOME | End: 2020-12-30
Attending: UROLOGY
Payer: MEDICARE

## 2020-12-30 DIAGNOSIS — N28.89 RENAL MASS: ICD-10-CM

## 2020-12-30 PROCEDURE — 76770 US EXAM ABDO BACK WALL COMP: CPT | Mod: TC,HCNC

## 2020-12-30 PROCEDURE — 76770 US KIDNEY: ICD-10-PCS | Mod: 26,HCNC,, | Performed by: RADIOLOGY

## 2020-12-30 PROCEDURE — 76770 US EXAM ABDO BACK WALL COMP: CPT | Mod: 26,HCNC,, | Performed by: RADIOLOGY

## 2020-12-31 ENCOUNTER — PATIENT MESSAGE (OUTPATIENT)
Dept: UROLOGY | Facility: CLINIC | Age: 75
End: 2020-12-31

## 2020-12-31 ENCOUNTER — TELEPHONE (OUTPATIENT)
Dept: UROLOGY | Facility: CLINIC | Age: 75
End: 2020-12-31

## 2020-12-31 DIAGNOSIS — N28.89 OTHER SPECIFIED DISORDERS OF KIDNEY AND URETER: ICD-10-CM

## 2020-12-31 NOTE — TELEPHONE ENCOUNTER
Please schedule MRI abdomen to evaluate the renal mass in the kidney. She will need to really push fluids the day before and day of and after getting the MRI because of kidney function.     Please schedule.

## 2020-12-31 NOTE — TELEPHONE ENCOUNTER
Scheduled MRI abdomen to evaluate the renal mass in the kidney. Instructed patient to push fluids the day before and day of and after getting the MRI because of kidney function. Patient verbalize understanding.

## 2021-01-08 ENCOUNTER — HOSPITAL ENCOUNTER (OUTPATIENT)
Dept: RADIOLOGY | Facility: HOSPITAL | Age: 76
Discharge: HOME | End: 2021-01-08
Attending: UROLOGY
Payer: MEDICARE

## 2021-01-08 DIAGNOSIS — N28.89 OTHER SPECIFIED DISORDERS OF KIDNEY AND URETER: ICD-10-CM

## 2021-01-08 PROCEDURE — 74183 MRI ABD W/O CNTR FLWD CNTR: CPT | Mod: TC,HCNC

## 2021-01-08 PROCEDURE — 74183 MRI ABD W/O CNTR FLWD CNTR: CPT | Mod: 26,HCNC,, | Performed by: RADIOLOGY

## 2021-01-08 PROCEDURE — 74183 MRI ABDOMEN W WO CONTRAST: ICD-10-PCS | Mod: 26,HCNC,, | Performed by: RADIOLOGY

## 2021-01-08 PROCEDURE — 25500020 PHARM REV CODE 255: Mod: HCNC | Performed by: UROLOGY

## 2021-01-08 PROCEDURE — A9585 GADOBUTROL INJECTION: HCPCS | Mod: HCNC | Performed by: UROLOGY

## 2021-01-08 RX ORDER — GADOBUTROL 604.72 MG/ML
10 INJECTION INTRAVENOUS
Status: COMPLETED | OUTPATIENT
Start: 2021-01-08 | End: 2021-01-08

## 2021-01-08 RX ADMIN — GADOBUTROL 10 ML: 604.72 INJECTION INTRAVENOUS at 02:01

## 2021-01-11 ENCOUNTER — TELEPHONE (OUTPATIENT)
Dept: UROLOGY | Facility: CLINIC | Age: 76
End: 2021-01-11

## 2021-01-11 LAB
CREAT SERPL-MCNC: 1.3 MG/DL (ref 0.5–1.4)
SAMPLE: NORMAL

## 2021-01-13 ENCOUNTER — IMMUNIZATION (OUTPATIENT)
Dept: INTERNAL MEDICINE | Facility: CLINIC | Age: 76
End: 2021-01-13
Payer: MEDICARE

## 2021-01-13 DIAGNOSIS — Z23 NEED FOR VACCINATION: ICD-10-CM

## 2021-01-13 PROCEDURE — 91300 COVID-19, MRNA, LNP-S, PF, 30 MCG/0.3 ML DOSE VACCINE: CPT | Mod: PBBFAC | Performed by: INTERNAL MEDICINE

## 2021-01-25 ENCOUNTER — OFFICE VISIT (OUTPATIENT)
Dept: UROLOGY | Facility: CLINIC | Age: 76
End: 2021-01-25
Payer: MEDICARE

## 2021-01-25 VITALS
SYSTOLIC BLOOD PRESSURE: 147 MMHG | WEIGHT: 178.56 LBS | BODY MASS INDEX: 30.48 KG/M2 | HEIGHT: 64 IN | HEART RATE: 88 BPM | DIASTOLIC BLOOD PRESSURE: 83 MMHG

## 2021-01-25 DIAGNOSIS — I10 ESSENTIAL HYPERTENSION: ICD-10-CM

## 2021-01-25 DIAGNOSIS — N28.89 RENAL MASS: Primary | ICD-10-CM

## 2021-01-25 DIAGNOSIS — N20.0 NEPHROLITHIASIS: ICD-10-CM

## 2021-01-25 DIAGNOSIS — N28.89 OTHER SPECIFIED DISORDERS OF KIDNEY AND URETER: ICD-10-CM

## 2021-01-25 PROCEDURE — 3079F DIAST BP 80-89 MM HG: CPT | Mod: HCNC,CPTII,S$GLB, | Performed by: UROLOGY

## 2021-01-25 PROCEDURE — 3288F PR FALLS RISK ASSESSMENT DOCUMENTED: ICD-10-PCS | Mod: HCNC,CPTII,S$GLB, | Performed by: UROLOGY

## 2021-01-25 PROCEDURE — 1159F PR MEDICATION LIST DOCUMENTED IN MEDICAL RECORD: ICD-10-PCS | Mod: HCNC,S$GLB,, | Performed by: UROLOGY

## 2021-01-25 PROCEDURE — 99999 PR PBB SHADOW E&M-EST. PATIENT-LVL III: ICD-10-PCS | Mod: PBBFAC,HCNC,, | Performed by: UROLOGY

## 2021-01-25 PROCEDURE — 1101F PR PT FALLS ASSESS DOC 0-1 FALLS W/OUT INJ PAST YR: ICD-10-PCS | Mod: HCNC,CPTII,S$GLB, | Performed by: UROLOGY

## 2021-01-25 PROCEDURE — 1159F MED LIST DOCD IN RCRD: CPT | Mod: HCNC,S$GLB,, | Performed by: UROLOGY

## 2021-01-25 PROCEDURE — 99204 PR OFFICE/OUTPT VISIT, NEW, LEVL IV, 45-59 MIN: ICD-10-PCS | Mod: HCNC,S$GLB,, | Performed by: UROLOGY

## 2021-01-25 PROCEDURE — 3079F PR MOST RECENT DIASTOLIC BLOOD PRESSURE 80-89 MM HG: ICD-10-PCS | Mod: HCNC,CPTII,S$GLB, | Performed by: UROLOGY

## 2021-01-25 PROCEDURE — 3077F PR MOST RECENT SYSTOLIC BLOOD PRESSURE >= 140 MM HG: ICD-10-PCS | Mod: HCNC,CPTII,S$GLB, | Performed by: UROLOGY

## 2021-01-25 PROCEDURE — 99999 PR PBB SHADOW E&M-EST. PATIENT-LVL III: CPT | Mod: PBBFAC,HCNC,, | Performed by: UROLOGY

## 2021-01-25 PROCEDURE — 1126F PR PAIN SEVERITY QUANTIFIED, NO PAIN PRESENT: ICD-10-PCS | Mod: HCNC,S$GLB,, | Performed by: UROLOGY

## 2021-01-25 PROCEDURE — 1126F AMNT PAIN NOTED NONE PRSNT: CPT | Mod: HCNC,S$GLB,, | Performed by: UROLOGY

## 2021-01-25 PROCEDURE — 1101F PT FALLS ASSESS-DOCD LE1/YR: CPT | Mod: HCNC,CPTII,S$GLB, | Performed by: UROLOGY

## 2021-01-25 PROCEDURE — 99204 OFFICE O/P NEW MOD 45 MIN: CPT | Mod: HCNC,S$GLB,, | Performed by: UROLOGY

## 2021-01-25 PROCEDURE — 3077F SYST BP >= 140 MM HG: CPT | Mod: HCNC,CPTII,S$GLB, | Performed by: UROLOGY

## 2021-01-25 PROCEDURE — 3288F FALL RISK ASSESSMENT DOCD: CPT | Mod: HCNC,CPTII,S$GLB, | Performed by: UROLOGY

## 2021-01-30 ENCOUNTER — PATIENT MESSAGE (OUTPATIENT)
Dept: UROLOGY | Facility: CLINIC | Age: 76
End: 2021-01-30

## 2021-02-04 ENCOUNTER — IMMUNIZATION (OUTPATIENT)
Dept: INTERNAL MEDICINE | Facility: CLINIC | Age: 76
End: 2021-02-04
Payer: MEDICARE

## 2021-02-04 DIAGNOSIS — Z23 NEED FOR VACCINATION: Primary | ICD-10-CM

## 2021-02-04 PROCEDURE — 91300 PR SARS-COV- 2 COVID-19 VACCINE, NO PRSV, 30MCG/0.3ML, IM: CPT | Mod: PBBFAC | Performed by: INTERNAL MEDICINE

## 2021-02-04 PROCEDURE — 0002A PR IMMUNIZ ADMIN, SARS-COV-2 COVID-19 VACC, 30MCG/0.3ML, 2ND DOSE: CPT | Mod: PBBFAC | Performed by: INTERNAL MEDICINE

## 2021-02-04 RX ADMIN — RNA INGREDIENT BNT-162B2 0.3 ML: 0.23 INJECTION, SUSPENSION INTRAMUSCULAR at 03:02

## 2021-02-16 ENCOUNTER — PATIENT MESSAGE (OUTPATIENT)
Dept: UROLOGY | Facility: CLINIC | Age: 76
End: 2021-02-16

## 2021-02-18 ENCOUNTER — TELEPHONE (OUTPATIENT)
Dept: UROLOGY | Facility: CLINIC | Age: 76
End: 2021-02-18

## 2021-02-18 DIAGNOSIS — N28.89 RENAL MASS: Primary | ICD-10-CM

## 2021-02-19 ENCOUNTER — TELEPHONE (OUTPATIENT)
Dept: PREADMISSION TESTING | Facility: HOSPITAL | Age: 76
End: 2021-02-19

## 2021-02-24 ENCOUNTER — TELEPHONE (OUTPATIENT)
Dept: PREADMISSION TESTING | Facility: HOSPITAL | Age: 76
End: 2021-02-24

## 2021-03-01 ENCOUNTER — RESEARCH ENCOUNTER (OUTPATIENT)
Dept: UROLOGY | Facility: CLINIC | Age: 76
End: 2021-03-01

## 2021-03-01 ENCOUNTER — HOSPITAL ENCOUNTER (OUTPATIENT)
Dept: CARDIOLOGY | Facility: CLINIC | Age: 76
Discharge: HOME | End: 2021-03-01
Payer: MEDICARE

## 2021-03-01 ENCOUNTER — HOSPITAL ENCOUNTER (OUTPATIENT)
Dept: PREADMISSION TESTING | Facility: HOSPITAL | Age: 76
Discharge: HOME | End: 2021-03-01
Payer: MEDICARE

## 2021-03-01 ENCOUNTER — OFFICE VISIT (OUTPATIENT)
Dept: UROLOGY | Facility: CLINIC | Age: 76
End: 2021-03-01
Payer: MEDICARE

## 2021-03-01 ENCOUNTER — ANESTHESIA EVENT (OUTPATIENT)
Dept: SURGERY | Facility: HOSPITAL | Age: 76
DRG: 657 | End: 2021-03-01
Payer: MEDICARE

## 2021-03-01 VITALS
OXYGEN SATURATION: 95 % | TEMPERATURE: 98 F | DIASTOLIC BLOOD PRESSURE: 66 MMHG | BODY MASS INDEX: 30.33 KG/M2 | WEIGHT: 176.69 LBS | RESPIRATION RATE: 18 BRPM | HEART RATE: 77 BPM | SYSTOLIC BLOOD PRESSURE: 143 MMHG

## 2021-03-01 DIAGNOSIS — N28.89 RIGHT RENAL MASS: ICD-10-CM

## 2021-03-01 DIAGNOSIS — Z01.818 PREOPERATIVE TESTING: ICD-10-CM

## 2021-03-01 PROCEDURE — 93005 EKG 12-LEAD: ICD-10-PCS | Mod: S$GLB,,, | Performed by: ANESTHESIOLOGY

## 2021-03-01 PROCEDURE — 99499 NO LOS: ICD-10-PCS | Mod: S$GLB,,, | Performed by: UROLOGY

## 2021-03-01 PROCEDURE — 93010 EKG 12-LEAD: ICD-10-PCS | Mod: S$GLB,,, | Performed by: INTERNAL MEDICINE

## 2021-03-01 PROCEDURE — 99999 PR PBB SHADOW E&M-EST. PATIENT-LVL II: ICD-10-PCS | Mod: PBBFAC,,,

## 2021-03-01 PROCEDURE — 93010 ELECTROCARDIOGRAM REPORT: CPT | Mod: S$GLB,,, | Performed by: INTERNAL MEDICINE

## 2021-03-01 PROCEDURE — 99999 PR PBB SHADOW E&M-EST. PATIENT-LVL II: CPT | Mod: PBBFAC,,,

## 2021-03-01 PROCEDURE — 99499 UNLISTED E&M SERVICE: CPT | Mod: S$GLB,,, | Performed by: UROLOGY

## 2021-03-01 PROCEDURE — 93005 ELECTROCARDIOGRAM TRACING: CPT | Mod: S$GLB,,, | Performed by: ANESTHESIOLOGY

## 2021-03-01 RX ORDER — PREGABALIN 75 MG/1
75 CAPSULE ORAL ONCE
Status: CANCELLED | OUTPATIENT
Start: 2021-03-01 | End: 2021-03-01

## 2021-03-01 RX ORDER — HEPARIN SODIUM 5000 [USP'U]/ML
5000 INJECTION, SOLUTION INTRAVENOUS; SUBCUTANEOUS EVERY 8 HOURS
Status: CANCELLED | OUTPATIENT
Start: 2021-03-01

## 2021-03-01 RX ORDER — ACETAMINOPHEN 500 MG
1000 TABLET ORAL
Status: CANCELLED | OUTPATIENT
Start: 2021-03-01

## 2021-03-01 RX ORDER — CEFAZOLIN SODIUM 2 G/50ML
2 SOLUTION INTRAVENOUS
Status: CANCELLED | OUTPATIENT
Start: 2021-03-01

## 2021-03-08 ENCOUNTER — OFFICE VISIT (OUTPATIENT)
Dept: INTERNAL MEDICINE | Facility: CLINIC | Age: 76
DRG: 657 | End: 2021-03-08
Payer: MEDICARE

## 2021-03-08 ENCOUNTER — LAB VISIT (OUTPATIENT)
Dept: INTERNAL MEDICINE | Facility: CLINIC | Age: 76
DRG: 657 | End: 2021-03-08
Payer: MEDICARE

## 2021-03-08 VITALS
HEART RATE: 87 BPM | BODY MASS INDEX: 30.48 KG/M2 | DIASTOLIC BLOOD PRESSURE: 68 MMHG | RESPIRATION RATE: 18 BRPM | SYSTOLIC BLOOD PRESSURE: 122 MMHG | HEIGHT: 64 IN | OXYGEN SATURATION: 98 % | TEMPERATURE: 97 F | WEIGHT: 178.56 LBS

## 2021-03-08 DIAGNOSIS — N28.89 RENAL MASS: ICD-10-CM

## 2021-03-08 DIAGNOSIS — I50.32 CHRONIC DIASTOLIC HEART FAILURE: ICD-10-CM

## 2021-03-08 DIAGNOSIS — I70.0 AORTIC ATHEROSCLEROSIS: ICD-10-CM

## 2021-03-08 DIAGNOSIS — I10 ESSENTIAL HYPERTENSION: ICD-10-CM

## 2021-03-08 DIAGNOSIS — Z01.818 PREOPERATIVE EXAMINATION: Primary | ICD-10-CM

## 2021-03-08 DIAGNOSIS — E78.5 HYPERLIPIDEMIA, UNSPECIFIED HYPERLIPIDEMIA TYPE: ICD-10-CM

## 2021-03-08 DIAGNOSIS — I47.29 NONSUSTAINED VENTRICULAR TACHYCARDIA: ICD-10-CM

## 2021-03-08 DIAGNOSIS — N18.32 STAGE 3B CHRONIC KIDNEY DISEASE: ICD-10-CM

## 2021-03-08 PROCEDURE — 99214 PR OFFICE/OUTPT VISIT, EST, LEVL IV, 30-39 MIN: ICD-10-PCS | Mod: S$GLB,,, | Performed by: INTERNAL MEDICINE

## 2021-03-08 PROCEDURE — 3078F PR MOST RECENT DIASTOLIC BLOOD PRESSURE < 80 MM HG: ICD-10-PCS | Mod: CPTII,S$GLB,, | Performed by: INTERNAL MEDICINE

## 2021-03-08 PROCEDURE — U0005 INFEC AGEN DETEC AMPLI PROBE: HCPCS | Performed by: UROLOGY

## 2021-03-08 PROCEDURE — 1159F PR MEDICATION LIST DOCUMENTED IN MEDICAL RECORD: ICD-10-PCS | Mod: S$GLB,,, | Performed by: INTERNAL MEDICINE

## 2021-03-08 PROCEDURE — 1101F PT FALLS ASSESS-DOCD LE1/YR: CPT | Mod: CPTII,S$GLB,, | Performed by: INTERNAL MEDICINE

## 2021-03-08 PROCEDURE — 3074F PR MOST RECENT SYSTOLIC BLOOD PRESSURE < 130 MM HG: ICD-10-PCS | Mod: CPTII,S$GLB,, | Performed by: INTERNAL MEDICINE

## 2021-03-08 PROCEDURE — 1159F MED LIST DOCD IN RCRD: CPT | Mod: S$GLB,,, | Performed by: INTERNAL MEDICINE

## 2021-03-08 PROCEDURE — 3078F DIAST BP <80 MM HG: CPT | Mod: CPTII,S$GLB,, | Performed by: INTERNAL MEDICINE

## 2021-03-08 PROCEDURE — 1101F PR PT FALLS ASSESS DOC 0-1 FALLS W/OUT INJ PAST YR: ICD-10-PCS | Mod: CPTII,S$GLB,, | Performed by: INTERNAL MEDICINE

## 2021-03-08 PROCEDURE — U0003 INFECTIOUS AGENT DETECTION BY NUCLEIC ACID (DNA OR RNA); SEVERE ACUTE RESPIRATORY SYNDROME CORONAVIRUS 2 (SARS-COV-2) (CORONAVIRUS DISEASE [COVID-19]), AMPLIFIED PROBE TECHNIQUE, MAKING USE OF HIGH THROUGHPUT TECHNOLOGIES AS DESCRIBED BY CMS-2020-01-R: HCPCS | Performed by: UROLOGY

## 2021-03-08 PROCEDURE — 99214 OFFICE O/P EST MOD 30 MIN: CPT | Mod: S$GLB,,, | Performed by: INTERNAL MEDICINE

## 2021-03-08 PROCEDURE — 99999 PR PBB SHADOW E&M-EST. PATIENT-LVL IV: ICD-10-PCS | Mod: PBBFAC,,, | Performed by: INTERNAL MEDICINE

## 2021-03-08 PROCEDURE — 99999 PR PBB SHADOW E&M-EST. PATIENT-LVL IV: CPT | Mod: PBBFAC,,, | Performed by: INTERNAL MEDICINE

## 2021-03-08 PROCEDURE — 3288F FALL RISK ASSESSMENT DOCD: CPT | Mod: CPTII,S$GLB,, | Performed by: INTERNAL MEDICINE

## 2021-03-08 PROCEDURE — 3288F PR FALLS RISK ASSESSMENT DOCUMENTED: ICD-10-PCS | Mod: CPTII,S$GLB,, | Performed by: INTERNAL MEDICINE

## 2021-03-08 PROCEDURE — 3074F SYST BP LT 130 MM HG: CPT | Mod: CPTII,S$GLB,, | Performed by: INTERNAL MEDICINE

## 2021-03-09 LAB — SARS-COV-2 RNA RESP QL NAA+PROBE: NOT DETECTED

## 2021-03-11 ENCOUNTER — RESEARCH ENCOUNTER (OUTPATIENT)
Dept: UROLOGY | Facility: CLINIC | Age: 76
End: 2021-03-11

## 2021-03-11 ENCOUNTER — ANESTHESIA (OUTPATIENT)
Dept: SURGERY | Facility: HOSPITAL | Age: 76
DRG: 657 | End: 2021-03-11
Payer: MEDICARE

## 2021-03-11 ENCOUNTER — HOSPITAL ENCOUNTER (INPATIENT)
Facility: HOSPITAL | Age: 76
LOS: 1 days | Discharge: HOME | DRG: 657 | End: 2021-03-12
Attending: UROLOGY | Admitting: UROLOGY
Payer: MEDICARE

## 2021-03-11 DIAGNOSIS — Z01.818 PREOPERATIVE TESTING: ICD-10-CM

## 2021-03-11 DIAGNOSIS — N28.89 RIGHT RENAL MASS: Primary | ICD-10-CM

## 2021-03-11 PROCEDURE — 27201423 OPTIME MED/SURG SUP & DEVICES STERILE SUPPLY: Performed by: UROLOGY

## 2021-03-11 PROCEDURE — 64461 RIGHT ERECTOR SPINAE PLANE SINGLE INJECTION BLOCK: ICD-10-PCS | Mod: 59,RT,, | Performed by: ANESTHESIOLOGY

## 2021-03-11 PROCEDURE — 36000712 HC OR TIME LEV V 1ST 15 MIN: Performed by: UROLOGY

## 2021-03-11 PROCEDURE — D9220A PRA ANESTHESIA: Mod: ,,, | Performed by: ANESTHESIOLOGY

## 2021-03-11 PROCEDURE — 25000003 PHARM REV CODE 250: Performed by: STUDENT IN AN ORGANIZED HEALTH CARE EDUCATION/TRAINING PROGRAM

## 2021-03-11 PROCEDURE — 37000009 HC ANESTHESIA EA ADD 15 MINS: Performed by: UROLOGY

## 2021-03-11 PROCEDURE — 36000713 HC OR TIME LEV V EA ADD 15 MIN: Performed by: UROLOGY

## 2021-03-11 PROCEDURE — D9220A PRA ANESTHESIA: ICD-10-PCS | Mod: ,,, | Performed by: ANESTHESIOLOGY

## 2021-03-11 PROCEDURE — 63600175 PHARM REV CODE 636 W HCPCS: Performed by: STUDENT IN AN ORGANIZED HEALTH CARE EDUCATION/TRAINING PROGRAM

## 2021-03-11 PROCEDURE — 11000001 HC ACUTE MED/SURG PRIVATE ROOM

## 2021-03-11 PROCEDURE — 71000033 HC RECOVERY, INTIAL HOUR: Performed by: UROLOGY

## 2021-03-11 PROCEDURE — 88305 TISSUE EXAM BY PATHOLOGIST: CPT | Performed by: PATHOLOGY

## 2021-03-11 PROCEDURE — 36620 ARTERIAL: ICD-10-PCS | Mod: 59,,, | Performed by: ANESTHESIOLOGY

## 2021-03-11 PROCEDURE — 76998 PR  ULTRASONIC GUIDANCE, INTRAOPERATIVE: ICD-10-PCS | Mod: 26,,, | Performed by: UROLOGY

## 2021-03-11 PROCEDURE — 25000003 PHARM REV CODE 250: Performed by: ANESTHESIOLOGY

## 2021-03-11 PROCEDURE — 63600175 PHARM REV CODE 636 W HCPCS: Performed by: ANESTHESIOLOGY

## 2021-03-11 PROCEDURE — 50543 PR LAP,PARTIAL NEPHRECTOMY: ICD-10-PCS | Mod: RT,,, | Performed by: UROLOGY

## 2021-03-11 PROCEDURE — 71000015 HC POSTOP RECOV 1ST HR: Performed by: UROLOGY

## 2021-03-11 PROCEDURE — 76998 US GUIDE INTRAOP: CPT | Mod: 26,,, | Performed by: UROLOGY

## 2021-03-11 PROCEDURE — 64461 PVB THORACIC SINGLE INJ SITE: CPT | Performed by: STUDENT IN AN ORGANIZED HEALTH CARE EDUCATION/TRAINING PROGRAM

## 2021-03-11 PROCEDURE — 36620 INSERTION CATHETER ARTERY: CPT | Mod: 59,,, | Performed by: ANESTHESIOLOGY

## 2021-03-11 PROCEDURE — 27201037 HC PRESSURE MONITORING SET UP

## 2021-03-11 PROCEDURE — 88305 TISSUE EXAM BY PATHOLOGIST: CPT | Mod: 26,,, | Performed by: PATHOLOGY

## 2021-03-11 PROCEDURE — 88307 TISSUE EXAM BY PATHOLOGIST: CPT | Performed by: PATHOLOGY

## 2021-03-11 PROCEDURE — 94799 UNLISTED PULMONARY SVC/PX: CPT

## 2021-03-11 PROCEDURE — 88305 TISSUE EXAM BY PATHOLOGIST: ICD-10-PCS | Mod: 26,,, | Performed by: PATHOLOGY

## 2021-03-11 PROCEDURE — 50543 LAPARO PARTIAL NEPHRECTOMY: CPT | Mod: RT,,, | Performed by: UROLOGY

## 2021-03-11 PROCEDURE — 37000008 HC ANESTHESIA 1ST 15 MINUTES: Performed by: UROLOGY

## 2021-03-11 PROCEDURE — 64461 PVB THORACIC SINGLE INJ SITE: CPT | Mod: 59,RT,, | Performed by: ANESTHESIOLOGY

## 2021-03-11 PROCEDURE — 71000016 HC POSTOP RECOV ADDL HR: Performed by: UROLOGY

## 2021-03-11 RX ORDER — PREGABALIN 75 MG/1
75 CAPSULE ORAL ONCE
Status: COMPLETED | OUTPATIENT
Start: 2021-03-11 | End: 2021-03-11

## 2021-03-11 RX ORDER — HEPARIN SODIUM 5000 [USP'U]/ML
5000 INJECTION, SOLUTION INTRAVENOUS; SUBCUTANEOUS EVERY 8 HOURS
Status: DISCONTINUED | OUTPATIENT
Start: 2021-03-11 | End: 2021-03-12 | Stop reason: HOSPADM

## 2021-03-11 RX ORDER — LOSARTAN POTASSIUM 25 MG/1
50 TABLET ORAL DAILY
Status: DISCONTINUED | OUTPATIENT
Start: 2021-03-12 | End: 2021-03-12 | Stop reason: HOSPADM

## 2021-03-11 RX ORDER — ROSUVASTATIN CALCIUM 5 MG/1
5 TABLET, COATED ORAL DAILY
Status: DISCONTINUED | OUTPATIENT
Start: 2021-03-12 | End: 2021-03-12 | Stop reason: HOSPADM

## 2021-03-11 RX ORDER — ROCURONIUM BROMIDE 10 MG/ML
INJECTION, SOLUTION INTRAVENOUS
Status: DISCONTINUED | OUTPATIENT
Start: 2021-03-11 | End: 2021-03-11

## 2021-03-11 RX ORDER — AMLODIPINE BESYLATE 2.5 MG/1
2.5 TABLET ORAL NIGHTLY
Status: DISCONTINUED | OUTPATIENT
Start: 2021-03-11 | End: 2021-03-12 | Stop reason: HOSPADM

## 2021-03-11 RX ORDER — FENTANYL CITRATE 50 UG/ML
100 INJECTION, SOLUTION INTRAMUSCULAR; INTRAVENOUS EVERY 5 MIN PRN
Status: DISCONTINUED | OUTPATIENT
Start: 2021-03-11 | End: 2021-03-11 | Stop reason: HOSPADM

## 2021-03-11 RX ORDER — SODIUM CHLORIDE 0.9 % (FLUSH) 0.9 %
3 SYRINGE (ML) INJECTION
Status: DISCONTINUED | OUTPATIENT
Start: 2021-03-11 | End: 2021-03-11 | Stop reason: HOSPADM

## 2021-03-11 RX ORDER — FENTANYL CITRATE 50 UG/ML
INJECTION, SOLUTION INTRAMUSCULAR; INTRAVENOUS
Status: DISCONTINUED | OUTPATIENT
Start: 2021-03-11 | End: 2021-03-11

## 2021-03-11 RX ORDER — HYDROMORPHONE HYDROCHLORIDE 1 MG/ML
0.2 INJECTION, SOLUTION INTRAMUSCULAR; INTRAVENOUS; SUBCUTANEOUS EVERY 5 MIN PRN
Status: DISCONTINUED | OUTPATIENT
Start: 2021-03-11 | End: 2021-03-11 | Stop reason: HOSPADM

## 2021-03-11 RX ORDER — PROPOFOL 10 MG/ML
VIAL (ML) INTRAVENOUS
Status: DISCONTINUED | OUTPATIENT
Start: 2021-03-11 | End: 2021-03-11

## 2021-03-11 RX ORDER — KETOROLAC TROMETHAMINE 30 MG/ML
15 INJECTION, SOLUTION INTRAMUSCULAR; INTRAVENOUS EVERY 6 HOURS
Status: DISCONTINUED | OUTPATIENT
Start: 2021-03-11 | End: 2021-03-12 | Stop reason: HOSPADM

## 2021-03-11 RX ORDER — PROCHLORPERAZINE EDISYLATE 5 MG/ML
5 INJECTION INTRAMUSCULAR; INTRAVENOUS EVERY 6 HOURS PRN
Status: DISCONTINUED | OUTPATIENT
Start: 2021-03-11 | End: 2021-03-12 | Stop reason: HOSPADM

## 2021-03-11 RX ORDER — ACETAMINOPHEN 500 MG
1000 TABLET ORAL
Status: COMPLETED | OUTPATIENT
Start: 2021-03-11 | End: 2021-03-11

## 2021-03-11 RX ORDER — METHOCARBAMOL 500 MG/1
1000 TABLET, FILM COATED ORAL 4 TIMES DAILY
Status: DISCONTINUED | OUTPATIENT
Start: 2021-03-11 | End: 2021-03-12 | Stop reason: HOSPADM

## 2021-03-11 RX ORDER — NEOSTIGMINE METHYLSULFATE 0.5 MG/ML
INJECTION, SOLUTION INTRAVENOUS
Status: DISCONTINUED | OUTPATIENT
Start: 2021-03-11 | End: 2021-03-11

## 2021-03-11 RX ORDER — ACETAMINOPHEN 500 MG
1000 TABLET ORAL
Status: DISCONTINUED | OUTPATIENT
Start: 2021-03-11 | End: 2021-03-12 | Stop reason: HOSPADM

## 2021-03-11 RX ORDER — LIDOCAINE HYDROCHLORIDE 20 MG/ML
INJECTION, SOLUTION EPIDURAL; INFILTRATION; INTRACAUDAL; PERINEURAL
Status: DISCONTINUED | OUTPATIENT
Start: 2021-03-11 | End: 2021-03-11

## 2021-03-11 RX ORDER — HEPARIN SODIUM 5000 [USP'U]/ML
5000 INJECTION, SOLUTION INTRAVENOUS; SUBCUTANEOUS EVERY 8 HOURS
Status: DISCONTINUED | OUTPATIENT
Start: 2021-03-11 | End: 2021-03-11 | Stop reason: HOSPADM

## 2021-03-11 RX ORDER — OXYCODONE HYDROCHLORIDE 5 MG/1
5 TABLET ORAL EVERY 6 HOURS PRN
Status: DISCONTINUED | OUTPATIENT
Start: 2021-03-11 | End: 2021-03-12 | Stop reason: HOSPADM

## 2021-03-11 RX ORDER — EPHEDRINE SULFATE 50 MG/ML
INJECTION, SOLUTION INTRAVENOUS
Status: DISCONTINUED | OUTPATIENT
Start: 2021-03-11 | End: 2021-03-11

## 2021-03-11 RX ORDER — CEFAZOLIN SODIUM 1 G/3ML
2 INJECTION, POWDER, FOR SOLUTION INTRAMUSCULAR; INTRAVENOUS
Status: COMPLETED | OUTPATIENT
Start: 2021-03-11 | End: 2021-03-11

## 2021-03-11 RX ORDER — FAMOTIDINE 20 MG/1
20 TABLET, FILM COATED ORAL 2 TIMES DAILY
Status: DISCONTINUED | OUTPATIENT
Start: 2021-03-11 | End: 2021-03-12

## 2021-03-11 RX ORDER — HALOPERIDOL 5 MG/ML
0.5 INJECTION INTRAMUSCULAR EVERY 10 MIN PRN
Status: DISCONTINUED | OUTPATIENT
Start: 2021-03-11 | End: 2021-03-11 | Stop reason: HOSPADM

## 2021-03-11 RX ORDER — PHENYLEPHRINE HCL IN 0.9% NACL 1 MG/10 ML
SYRINGE (ML) INTRAVENOUS
Status: DISCONTINUED | OUTPATIENT
Start: 2021-03-11 | End: 2021-03-11

## 2021-03-11 RX ORDER — MIDAZOLAM HYDROCHLORIDE 1 MG/ML
2 INJECTION INTRAMUSCULAR; INTRAVENOUS
Status: DISCONTINUED | OUTPATIENT
Start: 2021-03-11 | End: 2021-03-11 | Stop reason: HOSPADM

## 2021-03-11 RX ORDER — TRAMADOL HYDROCHLORIDE 50 MG/1
50 TABLET ORAL EVERY 6 HOURS PRN
Status: DISCONTINUED | OUTPATIENT
Start: 2021-03-11 | End: 2021-03-12 | Stop reason: HOSPADM

## 2021-03-11 RX ORDER — SODIUM CHLORIDE 9 MG/ML
INJECTION, SOLUTION INTRAVENOUS CONTINUOUS
Status: ACTIVE | OUTPATIENT
Start: 2021-03-11 | End: 2021-03-12

## 2021-03-11 RX ADMIN — SODIUM CHLORIDE: 0.9 INJECTION, SOLUTION INTRAVENOUS at 04:03

## 2021-03-11 RX ADMIN — ACETAMINOPHEN 1000 MG: 500 TABLET ORAL at 04:03

## 2021-03-11 RX ADMIN — ACETAMINOPHEN 1000 MG: 500 TABLET ORAL at 10:03

## 2021-03-11 RX ADMIN — Medication 200 MCG: at 11:03

## 2021-03-11 RX ADMIN — AMLODIPINE BESYLATE 2.5 MG: 2.5 TABLET ORAL at 09:03

## 2021-03-11 RX ADMIN — ROCURONIUM BROMIDE 50 MG: 10 INJECTION, SOLUTION INTRAVENOUS at 11:03

## 2021-03-11 RX ADMIN — SODIUM CHLORIDE: 0.9 INJECTION, SOLUTION INTRAVENOUS at 11:03

## 2021-03-11 RX ADMIN — Medication 100 MCG: at 02:03

## 2021-03-11 RX ADMIN — GLYCOPYRROLATE 0.2 MCG: 0.2 INJECTION, SOLUTION INTRAMUSCULAR; INTRAVITREAL at 12:03

## 2021-03-11 RX ADMIN — FENTANYL CITRATE 50 MCG: 50 INJECTION INTRAMUSCULAR; INTRAVENOUS at 10:03

## 2021-03-11 RX ADMIN — FENTANYL CITRATE 100 MCG: 50 INJECTION, SOLUTION INTRAMUSCULAR; INTRAVENOUS at 11:03

## 2021-03-11 RX ADMIN — ACETAMINOPHEN 1000 MG: 500 TABLET ORAL at 11:03

## 2021-03-11 RX ADMIN — METHOCARBAMOL 1000 MG: 500 TABLET ORAL at 04:03

## 2021-03-11 RX ADMIN — GLYCOPYRROLATE 0.2 MCG: 0.2 INJECTION, SOLUTION INTRAMUSCULAR; INTRAVITREAL at 02:03

## 2021-03-11 RX ADMIN — NEOSTIGMINE METHYLSULFATE 5 MG: 0.5 INJECTION INTRAVENOUS at 03:03

## 2021-03-11 RX ADMIN — HEPARIN SODIUM 5000 UNITS: 5000 INJECTION INTRAVENOUS; SUBCUTANEOUS at 10:03

## 2021-03-11 RX ADMIN — CEFAZOLIN 2 G: 330 INJECTION, POWDER, FOR SOLUTION INTRAMUSCULAR; INTRAVENOUS at 03:03

## 2021-03-11 RX ADMIN — ROCURONIUM BROMIDE 20 MG: 10 INJECTION, SOLUTION INTRAVENOUS at 01:03

## 2021-03-11 RX ADMIN — METHOCARBAMOL 1000 MG: 500 TABLET ORAL at 09:03

## 2021-03-11 RX ADMIN — EPHEDRINE SULFATE 5 MG: 50 INJECTION INTRAVENOUS at 02:03

## 2021-03-11 RX ADMIN — LIDOCAINE HYDROCHLORIDE 60 MG: 20 INJECTION, SOLUTION EPIDURAL; INFILTRATION; INTRACAUDAL at 11:03

## 2021-03-11 RX ADMIN — KETOROLAC TROMETHAMINE 15 MG: 30 INJECTION, SOLUTION INTRAMUSCULAR; INTRAVENOUS at 05:03

## 2021-03-11 RX ADMIN — ROCURONIUM BROMIDE 10 MG: 10 INJECTION, SOLUTION INTRAVENOUS at 01:03

## 2021-03-11 RX ADMIN — ROCURONIUM BROMIDE 10 MG: 10 INJECTION, SOLUTION INTRAVENOUS at 02:03

## 2021-03-11 RX ADMIN — Medication 100 MCG: at 03:03

## 2021-03-11 RX ADMIN — CEFAZOLIN 2 G: 330 INJECTION, POWDER, FOR SOLUTION INTRAMUSCULAR; INTRAVENOUS at 11:03

## 2021-03-11 RX ADMIN — FAMOTIDINE 20 MG: 20 TABLET, FILM COATED ORAL at 09:03

## 2021-03-11 RX ADMIN — HEPARIN SODIUM 5000 UNITS: 5000 INJECTION INTRAVENOUS; SUBCUTANEOUS at 11:03

## 2021-03-11 RX ADMIN — PROPOFOL 80 MG: 10 INJECTION, EMULSION INTRAVENOUS at 11:03

## 2021-03-11 RX ADMIN — PREGABALIN 75 MG: 75 CAPSULE ORAL at 10:03

## 2021-03-11 RX ADMIN — GLYCOPYRROLATE 0.6 MCG: 0.2 INJECTION, SOLUTION INTRAMUSCULAR; INTRAVITREAL at 03:03

## 2021-03-11 RX ADMIN — Medication 100 MCG: at 11:03

## 2021-03-12 VITALS
OXYGEN SATURATION: 94 % | BODY MASS INDEX: 30.39 KG/M2 | TEMPERATURE: 97 F | DIASTOLIC BLOOD PRESSURE: 67 MMHG | WEIGHT: 178 LBS | HEIGHT: 64 IN | RESPIRATION RATE: 18 BRPM | HEART RATE: 79 BPM | SYSTOLIC BLOOD PRESSURE: 138 MMHG

## 2021-03-12 LAB
ANION GAP SERPL CALC-SCNC: 8 MMOL/L (ref 8–16)
BASOPHILS # BLD AUTO: 0.01 K/UL (ref 0–0.2)
BASOPHILS NFR BLD: 0.1 % (ref 0–1.9)
BODY FLUID SOURCE, CREATININE: NORMAL
BUN SERPL-MCNC: 18 MG/DL (ref 8–23)
CALCIUM SERPL-MCNC: 8.3 MG/DL (ref 8.7–10.5)
CHLORIDE SERPL-SCNC: 112 MMOL/L (ref 95–110)
CO2 SERPL-SCNC: 25 MMOL/L (ref 23–29)
CREAT FLD-MCNC: 1 MG/DL
CREAT SERPL-MCNC: 1.2 MG/DL (ref 0.5–1.4)
DIFFERENTIAL METHOD: ABNORMAL
EOSINOPHIL # BLD AUTO: 0 K/UL (ref 0–0.5)
EOSINOPHIL NFR BLD: 0.1 % (ref 0–8)
ERYTHROCYTE [DISTWIDTH] IN BLOOD BY AUTOMATED COUNT: 13.3 % (ref 11.5–14.5)
EST. GFR  (AFRICAN AMERICAN): 50.4 ML/MIN/1.73 M^2
EST. GFR  (NON AFRICAN AMERICAN): 43.7 ML/MIN/1.73 M^2
GLUCOSE SERPL-MCNC: 94 MG/DL (ref 70–110)
HCT VFR BLD AUTO: 39.9 % (ref 37–48.5)
HGB BLD-MCNC: 12.9 G/DL (ref 12–16)
IMM GRANULOCYTES # BLD AUTO: 0.02 K/UL (ref 0–0.04)
IMM GRANULOCYTES NFR BLD AUTO: 0.2 % (ref 0–0.5)
LYMPHOCYTES # BLD AUTO: 1.3 K/UL (ref 1–4.8)
LYMPHOCYTES NFR BLD: 15.5 % (ref 18–48)
MCH RBC QN AUTO: 30.4 PG (ref 27–31)
MCHC RBC AUTO-ENTMCNC: 32.3 G/DL (ref 32–36)
MCV RBC AUTO: 94 FL (ref 82–98)
MONOCYTES # BLD AUTO: 0.8 K/UL (ref 0.3–1)
MONOCYTES NFR BLD: 9.1 % (ref 4–15)
NEUTROPHILS # BLD AUTO: 6.5 K/UL (ref 1.8–7.7)
NEUTROPHILS NFR BLD: 75 % (ref 38–73)
NRBC BLD-RTO: 0 /100 WBC
PLATELET # BLD AUTO: 170 K/UL (ref 150–350)
PMV BLD AUTO: 10.1 FL (ref 9.2–12.9)
POTASSIUM SERPL-SCNC: 4.4 MMOL/L (ref 3.5–5.1)
RBC # BLD AUTO: 4.25 M/UL (ref 4–5.4)
SODIUM SERPL-SCNC: 145 MMOL/L (ref 136–145)
WBC # BLD AUTO: 8.67 K/UL (ref 3.9–12.7)

## 2021-03-12 PROCEDURE — 27000221 HC OXYGEN, UP TO 24 HOURS

## 2021-03-12 PROCEDURE — 85025 COMPLETE CBC W/AUTO DIFF WBC: CPT | Performed by: STUDENT IN AN ORGANIZED HEALTH CARE EDUCATION/TRAINING PROGRAM

## 2021-03-12 PROCEDURE — 25000003 PHARM REV CODE 250: Performed by: STUDENT IN AN ORGANIZED HEALTH CARE EDUCATION/TRAINING PROGRAM

## 2021-03-12 PROCEDURE — 80048 BASIC METABOLIC PNL TOTAL CA: CPT | Performed by: STUDENT IN AN ORGANIZED HEALTH CARE EDUCATION/TRAINING PROGRAM

## 2021-03-12 PROCEDURE — 94761 N-INVAS EAR/PLS OXIMETRY MLT: CPT

## 2021-03-12 PROCEDURE — 82570 ASSAY OF URINE CREATININE: CPT | Performed by: STUDENT IN AN ORGANIZED HEALTH CARE EDUCATION/TRAINING PROGRAM

## 2021-03-12 PROCEDURE — 63600175 PHARM REV CODE 636 W HCPCS: Performed by: STUDENT IN AN ORGANIZED HEALTH CARE EDUCATION/TRAINING PROGRAM

## 2021-03-12 PROCEDURE — 94799 UNLISTED PULMONARY SVC/PX: CPT

## 2021-03-12 PROCEDURE — 36415 COLL VENOUS BLD VENIPUNCTURE: CPT | Performed by: STUDENT IN AN ORGANIZED HEALTH CARE EDUCATION/TRAINING PROGRAM

## 2021-03-12 RX ORDER — OXYCODONE HYDROCHLORIDE 5 MG/1
5 TABLET ORAL EVERY 6 HOURS PRN
Qty: 10 TABLET | Refills: 0 | Status: SHIPPED | OUTPATIENT
Start: 2021-03-12 | End: 2021-03-17

## 2021-03-12 RX ORDER — MUPIROCIN 20 MG/G
OINTMENT TOPICAL 2 TIMES DAILY
Status: DISCONTINUED | OUTPATIENT
Start: 2021-03-12 | End: 2021-03-12 | Stop reason: HOSPADM

## 2021-03-12 RX ORDER — FAMOTIDINE 20 MG/1
20 TABLET, FILM COATED ORAL DAILY
Status: DISCONTINUED | OUTPATIENT
Start: 2021-03-13 | End: 2021-03-12 | Stop reason: HOSPADM

## 2021-03-12 RX ORDER — IBUPROFEN 800 MG/1
800 TABLET ORAL EVERY 8 HOURS PRN
Qty: 21 TABLET | Refills: 0 | Status: SHIPPED | OUTPATIENT
Start: 2021-03-12 | End: 2021-03-19

## 2021-03-12 RX ORDER — POLYETHYLENE GLYCOL 3350 17 G/17G
17 POWDER, FOR SOLUTION ORAL DAILY
Qty: 238 G | Refills: 0 | Status: SHIPPED | OUTPATIENT
Start: 2021-03-12 | End: 2021-04-08

## 2021-03-12 RX ORDER — ACETAMINOPHEN 500 MG
1000 TABLET ORAL EVERY 8 HOURS PRN
Qty: 42 TABLET | Refills: 0 | Status: SHIPPED | OUTPATIENT
Start: 2021-03-12

## 2021-03-12 RX ADMIN — ROSUVASTATIN CALCIUM 5 MG: 5 TABLET, COATED ORAL at 09:03

## 2021-03-12 RX ADMIN — HEPARIN SODIUM 5000 UNITS: 5000 INJECTION INTRAVENOUS; SUBCUTANEOUS at 05:03

## 2021-03-12 RX ADMIN — KETOROLAC TROMETHAMINE 15 MG: 30 INJECTION, SOLUTION INTRAMUSCULAR; INTRAVENOUS at 05:03

## 2021-03-12 RX ADMIN — MUPIROCIN: 20 OINTMENT TOPICAL at 10:03

## 2021-03-12 RX ADMIN — HEPARIN SODIUM 5000 UNITS: 5000 INJECTION INTRAVENOUS; SUBCUTANEOUS at 03:03

## 2021-03-12 RX ADMIN — FAMOTIDINE 20 MG: 20 TABLET, FILM COATED ORAL at 09:03

## 2021-03-12 RX ADMIN — ACETAMINOPHEN 1000 MG: 500 TABLET ORAL at 04:03

## 2021-03-12 RX ADMIN — KETOROLAC TROMETHAMINE 15 MG: 30 INJECTION, SOLUTION INTRAMUSCULAR; INTRAVENOUS at 12:03

## 2021-03-12 RX ADMIN — METHOCARBAMOL 1000 MG: 500 TABLET ORAL at 09:03

## 2021-03-12 RX ADMIN — ACETAMINOPHEN 1000 MG: 500 TABLET ORAL at 10:03

## 2021-03-16 LAB
FINAL PATHOLOGIC DIAGNOSIS: NORMAL
GROSS: NORMAL
MICROSCOPIC EXAM: NORMAL

## 2021-03-17 ENCOUNTER — TELEPHONE (OUTPATIENT)
Dept: UROLOGY | Facility: CLINIC | Age: 76
End: 2021-03-17

## 2021-03-18 ENCOUNTER — PATIENT MESSAGE (OUTPATIENT)
Dept: RESEARCH | Facility: HOSPITAL | Age: 76
End: 2021-03-18

## 2021-03-24 ENCOUNTER — PES CALL (OUTPATIENT)
Dept: ADMINISTRATIVE | Facility: CLINIC | Age: 76
End: 2021-03-24

## 2021-03-26 ENCOUNTER — PATIENT MESSAGE (OUTPATIENT)
Dept: RESEARCH | Facility: HOSPITAL | Age: 76
End: 2021-03-26

## 2021-04-07 ENCOUNTER — TELEPHONE (OUTPATIENT)
Dept: FAMILY MEDICINE | Facility: CLINIC | Age: 76
End: 2021-04-07

## 2021-04-08 ENCOUNTER — OFFICE VISIT (OUTPATIENT)
Dept: INTERNAL MEDICINE | Facility: CLINIC | Age: 76
End: 2021-04-08
Payer: MEDICARE

## 2021-04-08 VITALS
WEIGHT: 167.56 LBS | HEIGHT: 64 IN | DIASTOLIC BLOOD PRESSURE: 70 MMHG | OXYGEN SATURATION: 95 % | BODY MASS INDEX: 28.6 KG/M2 | HEART RATE: 98 BPM | SYSTOLIC BLOOD PRESSURE: 122 MMHG

## 2021-04-08 DIAGNOSIS — R53.83 FATIGUE, UNSPECIFIED TYPE: Primary | ICD-10-CM

## 2021-04-08 DIAGNOSIS — I10 ESSENTIAL HYPERTENSION: ICD-10-CM

## 2021-04-08 DIAGNOSIS — R94.31 ABNORMAL ELECTROCARDIOGRAM (ECG) (EKG): ICD-10-CM

## 2021-04-08 DIAGNOSIS — R42 DIZZINESS AND GIDDINESS: ICD-10-CM

## 2021-04-08 DIAGNOSIS — R94.31 ABNORMAL EKG: ICD-10-CM

## 2021-04-08 PROCEDURE — 99214 OFFICE O/P EST MOD 30 MIN: CPT | Mod: S$GLB,,, | Performed by: INTERNAL MEDICINE

## 2021-04-08 PROCEDURE — 93005 ELECTROCARDIOGRAM TRACING: CPT | Mod: S$GLB,,, | Performed by: INTERNAL MEDICINE

## 2021-04-08 PROCEDURE — 3288F PR FALLS RISK ASSESSMENT DOCUMENTED: ICD-10-PCS | Mod: CPTII,S$GLB,, | Performed by: INTERNAL MEDICINE

## 2021-04-08 PROCEDURE — 93005 EKG 12-LEAD: ICD-10-PCS | Mod: S$GLB,,, | Performed by: INTERNAL MEDICINE

## 2021-04-08 PROCEDURE — 99999 PR PBB SHADOW E&M-EST. PATIENT-LVL IV: CPT | Mod: PBBFAC,,, | Performed by: INTERNAL MEDICINE

## 2021-04-08 PROCEDURE — 1126F PR PAIN SEVERITY QUANTIFIED, NO PAIN PRESENT: ICD-10-PCS | Mod: S$GLB,,, | Performed by: INTERNAL MEDICINE

## 2021-04-08 PROCEDURE — 93010 EKG 12-LEAD: ICD-10-PCS | Mod: S$GLB,,, | Performed by: INTERNAL MEDICINE

## 2021-04-08 PROCEDURE — 99214 PR OFFICE/OUTPT VISIT, EST, LEVL IV, 30-39 MIN: ICD-10-PCS | Mod: S$GLB,,, | Performed by: INTERNAL MEDICINE

## 2021-04-08 PROCEDURE — 99999 PR PBB SHADOW E&M-EST. PATIENT-LVL IV: ICD-10-PCS | Mod: PBBFAC,,, | Performed by: INTERNAL MEDICINE

## 2021-04-08 PROCEDURE — 1101F PT FALLS ASSESS-DOCD LE1/YR: CPT | Mod: CPTII,S$GLB,, | Performed by: INTERNAL MEDICINE

## 2021-04-08 PROCEDURE — 1101F PR PT FALLS ASSESS DOC 0-1 FALLS W/OUT INJ PAST YR: ICD-10-PCS | Mod: CPTII,S$GLB,, | Performed by: INTERNAL MEDICINE

## 2021-04-08 PROCEDURE — 1159F PR MEDICATION LIST DOCUMENTED IN MEDICAL RECORD: ICD-10-PCS | Mod: S$GLB,,, | Performed by: INTERNAL MEDICINE

## 2021-04-08 PROCEDURE — 1159F MED LIST DOCD IN RCRD: CPT | Mod: S$GLB,,, | Performed by: INTERNAL MEDICINE

## 2021-04-08 PROCEDURE — 93010 ELECTROCARDIOGRAM REPORT: CPT | Mod: S$GLB,,, | Performed by: INTERNAL MEDICINE

## 2021-04-08 PROCEDURE — 3288F FALL RISK ASSESSMENT DOCD: CPT | Mod: CPTII,S$GLB,, | Performed by: INTERNAL MEDICINE

## 2021-04-08 PROCEDURE — 1126F AMNT PAIN NOTED NONE PRSNT: CPT | Mod: S$GLB,,, | Performed by: INTERNAL MEDICINE

## 2021-04-09 ENCOUNTER — LAB VISIT (OUTPATIENT)
Dept: LAB | Facility: HOSPITAL | Age: 76
End: 2021-04-09
Attending: INTERNAL MEDICINE
Payer: MEDICARE

## 2021-04-09 DIAGNOSIS — R53.83 FATIGUE, UNSPECIFIED TYPE: ICD-10-CM

## 2021-04-09 LAB
ALBUMIN SERPL BCP-MCNC: 3.7 G/DL (ref 3.5–5.2)
ALP SERPL-CCNC: 103 U/L (ref 55–135)
ALT SERPL W/O P-5'-P-CCNC: 27 U/L (ref 10–44)
ANION GAP SERPL CALC-SCNC: 12 MMOL/L (ref 8–16)
AST SERPL-CCNC: 38 U/L (ref 10–40)
BASOPHILS # BLD AUTO: 0.09 K/UL (ref 0–0.2)
BASOPHILS NFR BLD: 1.1 % (ref 0–1.9)
BILIRUB SERPL-MCNC: 0.9 MG/DL (ref 0.1–1)
BUN SERPL-MCNC: 23 MG/DL (ref 8–23)
CALCIUM SERPL-MCNC: 10.6 MG/DL (ref 8.7–10.5)
CHLORIDE SERPL-SCNC: 107 MMOL/L (ref 95–110)
CO2 SERPL-SCNC: 24 MMOL/L (ref 23–29)
CORTIS SERPL-MCNC: 10.7 UG/DL
CREAT SERPL-MCNC: 1.5 MG/DL (ref 0.5–1.4)
CRP SERPL-MCNC: 0.4 MG/L (ref 0–8.2)
DIFFERENTIAL METHOD: NORMAL
EOSINOPHIL # BLD AUTO: 0.3 K/UL (ref 0–0.5)
EOSINOPHIL NFR BLD: 3.5 % (ref 0–8)
ERYTHROCYTE [DISTWIDTH] IN BLOOD BY AUTOMATED COUNT: 13.4 % (ref 11.5–14.5)
ERYTHROCYTE [SEDIMENTATION RATE] IN BLOOD BY WESTERGREN METHOD: <2 MM/HR (ref 0–36)
EST. GFR  (AFRICAN AMERICAN): 38.5 ML/MIN/1.73 M^2
EST. GFR  (NON AFRICAN AMERICAN): 33.4 ML/MIN/1.73 M^2
GLUCOSE SERPL-MCNC: 92 MG/DL (ref 70–110)
HCT VFR BLD AUTO: 45.2 % (ref 37–48.5)
HGB BLD-MCNC: 14.7 G/DL (ref 12–16)
IMM GRANULOCYTES # BLD AUTO: 0.03 K/UL (ref 0–0.04)
IMM GRANULOCYTES NFR BLD AUTO: 0.4 % (ref 0–0.5)
LYMPHOCYTES # BLD AUTO: 3.1 K/UL (ref 1–4.8)
LYMPHOCYTES NFR BLD: 37.6 % (ref 18–48)
MCH RBC QN AUTO: 30.2 PG (ref 27–31)
MCHC RBC AUTO-ENTMCNC: 32.5 G/DL (ref 32–36)
MCV RBC AUTO: 93 FL (ref 82–98)
MONOCYTES # BLD AUTO: 1 K/UL (ref 0.3–1)
MONOCYTES NFR BLD: 11.4 % (ref 4–15)
NEUTROPHILS # BLD AUTO: 3.9 K/UL (ref 1.8–7.7)
NEUTROPHILS NFR BLD: 46 % (ref 38–73)
NRBC BLD-RTO: 0 /100 WBC
PLATELET # BLD AUTO: 246 K/UL (ref 150–450)
PMV BLD AUTO: 10.2 FL (ref 9.2–12.9)
POTASSIUM SERPL-SCNC: 4.2 MMOL/L (ref 3.5–5.1)
PROT SERPL-MCNC: 7.1 G/DL (ref 6–8.4)
RBC # BLD AUTO: 4.86 M/UL (ref 4–5.4)
SODIUM SERPL-SCNC: 143 MMOL/L (ref 136–145)
T4 FREE SERPL-MCNC: 0.99 NG/DL (ref 0.71–1.51)
TSH SERPL DL<=0.005 MIU/L-ACNC: 4.83 UIU/ML (ref 0.4–4)
WBC # BLD AUTO: 8.36 K/UL (ref 3.9–12.7)

## 2021-04-09 PROCEDURE — 84443 ASSAY THYROID STIM HORMONE: CPT | Performed by: INTERNAL MEDICINE

## 2021-04-09 PROCEDURE — 80053 COMPREHEN METABOLIC PANEL: CPT | Performed by: INTERNAL MEDICINE

## 2021-04-09 PROCEDURE — 85652 RBC SED RATE AUTOMATED: CPT | Performed by: INTERNAL MEDICINE

## 2021-04-09 PROCEDURE — 85025 COMPLETE CBC W/AUTO DIFF WBC: CPT | Performed by: INTERNAL MEDICINE

## 2021-04-09 PROCEDURE — 84439 ASSAY OF FREE THYROXINE: CPT | Performed by: INTERNAL MEDICINE

## 2021-04-09 PROCEDURE — 36415 COLL VENOUS BLD VENIPUNCTURE: CPT | Mod: PO | Performed by: INTERNAL MEDICINE

## 2021-04-09 PROCEDURE — 86140 C-REACTIVE PROTEIN: CPT | Performed by: INTERNAL MEDICINE

## 2021-04-09 PROCEDURE — 82533 TOTAL CORTISOL: CPT | Performed by: INTERNAL MEDICINE

## 2021-04-12 DIAGNOSIS — R31.29 MICROSCOPIC HEMATURIA: Primary | ICD-10-CM

## 2021-04-13 DIAGNOSIS — E03.8 SUBCLINICAL HYPOTHYROIDISM: Primary | ICD-10-CM

## 2021-04-14 ENCOUNTER — CLINICAL SUPPORT (OUTPATIENT)
Dept: CARDIOLOGY | Facility: HOSPITAL | Age: 76
End: 2021-04-14
Attending: INTERNAL MEDICINE
Payer: MEDICARE

## 2021-04-14 DIAGNOSIS — R42 DIZZINESS AND GIDDINESS: ICD-10-CM

## 2021-04-14 DIAGNOSIS — R53.83 FATIGUE, UNSPECIFIED TYPE: ICD-10-CM

## 2021-04-14 PROCEDURE — 93272 CARDIAC EVENT MONITOR (CUPID ONLY): ICD-10-PCS | Mod: ,,, | Performed by: INTERNAL MEDICINE

## 2021-04-14 PROCEDURE — 93272 ECG/REVIEW INTERPRET ONLY: CPT | Mod: ,,, | Performed by: INTERNAL MEDICINE

## 2021-04-14 PROCEDURE — 93271 ECG/MONITORING AND ANALYSIS: CPT

## 2021-04-15 ENCOUNTER — HOSPITAL ENCOUNTER (OUTPATIENT)
Dept: RADIOLOGY | Facility: HOSPITAL | Age: 76
Discharge: HOME | End: 2021-04-15
Attending: INTERNAL MEDICINE
Payer: MEDICARE

## 2021-04-15 ENCOUNTER — HOSPITAL ENCOUNTER (OUTPATIENT)
Dept: CARDIOLOGY | Facility: HOSPITAL | Age: 76
Discharge: HOME | End: 2021-04-15
Attending: INTERNAL MEDICINE
Payer: MEDICARE

## 2021-04-15 VITALS
HEART RATE: 88 BPM | HEIGHT: 64 IN | BODY MASS INDEX: 28.51 KG/M2 | DIASTOLIC BLOOD PRESSURE: 70 MMHG | SYSTOLIC BLOOD PRESSURE: 138 MMHG | RESPIRATION RATE: 16 BRPM | WEIGHT: 167 LBS

## 2021-04-15 DIAGNOSIS — R94.31 ABNORMAL ELECTROCARDIOGRAM (ECG) (EKG): ICD-10-CM

## 2021-04-15 DIAGNOSIS — R94.31 ABNORMAL EKG: ICD-10-CM

## 2021-04-15 DIAGNOSIS — R53.83 FATIGUE, UNSPECIFIED TYPE: ICD-10-CM

## 2021-04-15 LAB
CV STRESS BASE HR: 88 BPM
DIASTOLIC BLOOD PRESSURE: 70 MMHG
OHS CV CPX 1 MINUTE RECOVERY HEART RATE: 99 BPM
OHS CV CPX 85 PERCENT MAX PREDICTED HEART RATE MALE: 118
OHS CV CPX MAX PREDICTED HEART RATE: 138
OHS CV CPX PATIENT IS FEMALE: 1
OHS CV CPX PATIENT IS MALE: 0
OHS CV CPX PEAK DIASTOLIC BLOOD PRESSURE: 48 MMHG
OHS CV CPX PEAK HEAR RATE: 101 BPM
OHS CV CPX PEAK RATE PRESSURE PRODUCT: NORMAL
OHS CV CPX PEAK SYSTOLIC BLOOD PRESSURE: 106 MMHG
OHS CV CPX PERCENT MAX PREDICTED HEART RATE ACHIEVED: 73
OHS CV CPX RATE PRESSURE PRODUCT PRESENTING: NORMAL
SYSTOLIC BLOOD PRESSURE: 138 MMHG

## 2021-04-15 PROCEDURE — 93017 CV STRESS TEST TRACING ONLY: CPT

## 2021-04-15 PROCEDURE — 78452 HT MUSCLE IMAGE SPECT MULT: CPT | Mod: 26,,, | Performed by: RADIOLOGY

## 2021-04-15 PROCEDURE — 93016 NUCLEAR STRESS TEST (CUPID ONLY): ICD-10-PCS | Mod: ,,, | Performed by: INTERNAL MEDICINE

## 2021-04-15 PROCEDURE — 93016 CV STRESS TEST SUPVJ ONLY: CPT | Mod: ,,, | Performed by: INTERNAL MEDICINE

## 2021-04-15 PROCEDURE — 63600175 PHARM REV CODE 636 W HCPCS: Performed by: INTERNAL MEDICINE

## 2021-04-15 PROCEDURE — 78452 HT MUSCLE IMAGE SPECT MULT: CPT | Mod: TC

## 2021-04-15 PROCEDURE — 93018 NUCLEAR STRESS TEST (CUPID ONLY): ICD-10-PCS | Mod: ,,, | Performed by: INTERNAL MEDICINE

## 2021-04-15 PROCEDURE — 93018 CV STRESS TEST I&R ONLY: CPT | Mod: ,,, | Performed by: INTERNAL MEDICINE

## 2021-04-15 PROCEDURE — A9500 TC99M SESTAMIBI: HCPCS

## 2021-04-15 PROCEDURE — 78452 NM MYOCARDIAL PERFUSION SPECT MULTI PHARM: ICD-10-PCS | Mod: 26,,, | Performed by: RADIOLOGY

## 2021-04-15 RX ORDER — REGADENOSON 0.08 MG/ML
0.4 INJECTION, SOLUTION INTRAVENOUS
Status: COMPLETED | OUTPATIENT
Start: 2021-04-15 | End: 2021-04-15

## 2021-04-15 RX ADMIN — REGADENOSON 0.4 MG: 0.08 INJECTION, SOLUTION INTRAVENOUS at 11:04

## 2021-04-19 ENCOUNTER — OFFICE VISIT (OUTPATIENT)
Dept: UROLOGY | Facility: CLINIC | Age: 76
End: 2021-04-19
Payer: MEDICARE

## 2021-04-19 VITALS
WEIGHT: 167.13 LBS | HEIGHT: 64 IN | HEART RATE: 100 BPM | SYSTOLIC BLOOD PRESSURE: 146 MMHG | DIASTOLIC BLOOD PRESSURE: 84 MMHG | BODY MASS INDEX: 28.53 KG/M2

## 2021-04-19 DIAGNOSIS — D30.01 RENAL ONCOCYTOMA OF RIGHT KIDNEY: Primary | ICD-10-CM

## 2021-04-19 PROCEDURE — 99999 PR PBB SHADOW E&M-EST. PATIENT-LVL III: CPT | Mod: PBBFAC,,, | Performed by: UROLOGY

## 2021-04-19 PROCEDURE — 1126F PR PAIN SEVERITY QUANTIFIED, NO PAIN PRESENT: ICD-10-PCS | Mod: S$GLB,,, | Performed by: UROLOGY

## 2021-04-19 PROCEDURE — 99024 POSTOP FOLLOW-UP VISIT: CPT | Mod: S$GLB,,, | Performed by: UROLOGY

## 2021-04-19 PROCEDURE — 99999 PR PBB SHADOW E&M-EST. PATIENT-LVL III: ICD-10-PCS | Mod: PBBFAC,,, | Performed by: UROLOGY

## 2021-04-19 PROCEDURE — 1126F AMNT PAIN NOTED NONE PRSNT: CPT | Mod: S$GLB,,, | Performed by: UROLOGY

## 2021-04-19 PROCEDURE — 99024 PR POST-OP FOLLOW-UP VISIT: ICD-10-PCS | Mod: S$GLB,,, | Performed by: UROLOGY

## 2021-04-26 ENCOUNTER — PATIENT MESSAGE (OUTPATIENT)
Dept: INTERNAL MEDICINE | Facility: CLINIC | Age: 76
End: 2021-04-26

## 2021-04-28 ENCOUNTER — PES CALL (OUTPATIENT)
Dept: ADMINISTRATIVE | Facility: CLINIC | Age: 76
End: 2021-04-28

## 2021-05-05 ENCOUNTER — PATIENT MESSAGE (OUTPATIENT)
Dept: ADMINISTRATIVE | Facility: OTHER | Age: 76
End: 2021-05-05

## 2021-05-10 ENCOUNTER — PATIENT MESSAGE (OUTPATIENT)
Dept: INTERNAL MEDICINE | Facility: CLINIC | Age: 76
End: 2021-05-10

## 2021-05-13 DIAGNOSIS — N20.0 KIDNEY STONES: Primary | ICD-10-CM

## 2021-05-14 ENCOUNTER — LAB VISIT (OUTPATIENT)
Dept: LAB | Facility: HOSPITAL | Age: 76
End: 2021-05-14
Attending: HOSPITALIST
Payer: MEDICARE

## 2021-05-14 ENCOUNTER — OFFICE VISIT (OUTPATIENT)
Dept: INTERNAL MEDICINE | Facility: CLINIC | Age: 76
End: 2021-05-14
Payer: MEDICARE

## 2021-05-14 VITALS
WEIGHT: 176.56 LBS | BODY MASS INDEX: 30.31 KG/M2 | DIASTOLIC BLOOD PRESSURE: 62 MMHG | HEART RATE: 90 BPM | SYSTOLIC BLOOD PRESSURE: 122 MMHG | OXYGEN SATURATION: 95 %

## 2021-05-14 DIAGNOSIS — I10 ESSENTIAL HYPERTENSION: ICD-10-CM

## 2021-05-14 DIAGNOSIS — N20.0 KIDNEY STONES: ICD-10-CM

## 2021-05-14 DIAGNOSIS — R79.89 ABNORMAL TSH: ICD-10-CM

## 2021-05-14 DIAGNOSIS — N18.32 STAGE 3B CHRONIC KIDNEY DISEASE: ICD-10-CM

## 2021-05-14 DIAGNOSIS — R53.83 FATIGUE, UNSPECIFIED TYPE: ICD-10-CM

## 2021-05-14 LAB
BASOPHILS # BLD AUTO: 0.08 K/UL (ref 0–0.2)
BASOPHILS NFR BLD: 1.4 % (ref 0–1.9)
DIFFERENTIAL METHOD: ABNORMAL
EOSINOPHIL # BLD AUTO: 0.2 K/UL (ref 0–0.5)
EOSINOPHIL NFR BLD: 4.1 % (ref 0–8)
ERYTHROCYTE [DISTWIDTH] IN BLOOD BY AUTOMATED COUNT: 13.2 % (ref 11.5–14.5)
HCT VFR BLD AUTO: 41.8 % (ref 37–48.5)
HGB BLD-MCNC: 13.4 G/DL (ref 12–16)
IMM GRANULOCYTES # BLD AUTO: 0.02 K/UL (ref 0–0.04)
IMM GRANULOCYTES NFR BLD AUTO: 0.4 % (ref 0–0.5)
LYMPHOCYTES # BLD AUTO: 1.7 K/UL (ref 1–4.8)
LYMPHOCYTES NFR BLD: 30.5 % (ref 18–48)
MCH RBC QN AUTO: 30.2 PG (ref 27–31)
MCHC RBC AUTO-ENTMCNC: 32.1 G/DL (ref 32–36)
MCV RBC AUTO: 94 FL (ref 82–98)
MONOCYTES # BLD AUTO: 0.9 K/UL (ref 0.3–1)
MONOCYTES NFR BLD: 16.5 % (ref 4–15)
NEUTROPHILS # BLD AUTO: 2.6 K/UL (ref 1.8–7.7)
NEUTROPHILS NFR BLD: 47.1 % (ref 38–73)
NRBC BLD-RTO: 0 /100 WBC
PLATELET # BLD AUTO: 194 K/UL (ref 150–450)
PMV BLD AUTO: 10.2 FL (ref 9.2–12.9)
RBC # BLD AUTO: 4.43 M/UL (ref 4–5.4)
WBC # BLD AUTO: 5.58 K/UL (ref 3.9–12.7)

## 2021-05-14 PROCEDURE — 36415 COLL VENOUS BLD VENIPUNCTURE: CPT | Mod: PO | Performed by: HOSPITALIST

## 2021-05-14 PROCEDURE — 99213 PR OFFICE/OUTPT VISIT, EST, LEVL III, 20-29 MIN: ICD-10-PCS | Mod: S$GLB,,, | Performed by: INTERNAL MEDICINE

## 2021-05-14 PROCEDURE — 1126F AMNT PAIN NOTED NONE PRSNT: CPT | Mod: S$GLB,,, | Performed by: INTERNAL MEDICINE

## 2021-05-14 PROCEDURE — 80069 RENAL FUNCTION PANEL: CPT | Performed by: HOSPITALIST

## 2021-05-14 PROCEDURE — 1126F PR PAIN SEVERITY QUANTIFIED, NO PAIN PRESENT: ICD-10-PCS | Mod: S$GLB,,, | Performed by: INTERNAL MEDICINE

## 2021-05-14 PROCEDURE — 99999 PR PBB SHADOW E&M-EST. PATIENT-LVL III: ICD-10-PCS | Mod: PBBFAC,,, | Performed by: INTERNAL MEDICINE

## 2021-05-14 PROCEDURE — 1159F MED LIST DOCD IN RCRD: CPT | Mod: S$GLB,,, | Performed by: INTERNAL MEDICINE

## 2021-05-14 PROCEDURE — 99213 OFFICE O/P EST LOW 20 MIN: CPT | Mod: S$GLB,,, | Performed by: INTERNAL MEDICINE

## 2021-05-14 PROCEDURE — 1159F PR MEDICATION LIST DOCUMENTED IN MEDICAL RECORD: ICD-10-PCS | Mod: S$GLB,,, | Performed by: INTERNAL MEDICINE

## 2021-05-14 PROCEDURE — 85025 COMPLETE CBC W/AUTO DIFF WBC: CPT | Performed by: HOSPITALIST

## 2021-05-14 PROCEDURE — 99999 PR PBB SHADOW E&M-EST. PATIENT-LVL III: CPT | Mod: PBBFAC,,, | Performed by: INTERNAL MEDICINE

## 2021-05-14 RX ORDER — AMLODIPINE BESYLATE 5 MG/1
5 TABLET ORAL DAILY
Qty: 90 TABLET | Refills: 1 | Status: ON HOLD | OUTPATIENT
Start: 2021-05-14 | End: 2021-06-28 | Stop reason: HOSPADM

## 2021-05-15 LAB
ALBUMIN SERPL BCP-MCNC: 3.4 G/DL (ref 3.5–5.2)
ANION GAP SERPL CALC-SCNC: 8 MMOL/L (ref 8–16)
BUN SERPL-MCNC: 25 MG/DL (ref 8–23)
CALCIUM SERPL-MCNC: 10.3 MG/DL (ref 8.7–10.5)
CHLORIDE SERPL-SCNC: 106 MMOL/L (ref 95–110)
CO2 SERPL-SCNC: 27 MMOL/L (ref 23–29)
CREAT SERPL-MCNC: 1.2 MG/DL (ref 0.5–1.4)
EST. GFR  (AFRICAN AMERICAN): 50.4 ML/MIN/1.73 M^2
EST. GFR  (NON AFRICAN AMERICAN): 43.7 ML/MIN/1.73 M^2
GLUCOSE SERPL-MCNC: 85 MG/DL (ref 70–110)
PHOSPHATE SERPL-MCNC: 3.6 MG/DL (ref 2.7–4.5)
POTASSIUM SERPL-SCNC: 4.2 MMOL/L (ref 3.5–5.1)
SODIUM SERPL-SCNC: 141 MMOL/L (ref 136–145)

## 2021-05-19 ENCOUNTER — OFFICE VISIT (OUTPATIENT)
Dept: NEPHROLOGY | Facility: CLINIC | Age: 76
End: 2021-05-19
Payer: MEDICARE

## 2021-05-19 VITALS
SYSTOLIC BLOOD PRESSURE: 126 MMHG | WEIGHT: 176.56 LBS | HEART RATE: 80 BPM | BODY MASS INDEX: 30.14 KG/M2 | OXYGEN SATURATION: 96 % | DIASTOLIC BLOOD PRESSURE: 72 MMHG | HEIGHT: 64 IN

## 2021-05-19 DIAGNOSIS — R31.29 MICROSCOPIC HEMATURIA: ICD-10-CM

## 2021-05-19 DIAGNOSIS — N20.0 KIDNEY STONES: ICD-10-CM

## 2021-05-19 DIAGNOSIS — N18.31 STAGE 3A CHRONIC KIDNEY DISEASE: Primary | ICD-10-CM

## 2021-05-19 DIAGNOSIS — I10 ESSENTIAL HYPERTENSION: ICD-10-CM

## 2021-05-19 PROCEDURE — 1101F PR PT FALLS ASSESS DOC 0-1 FALLS W/OUT INJ PAST YR: ICD-10-PCS | Mod: CPTII,S$GLB,, | Performed by: HOSPITALIST

## 2021-05-19 PROCEDURE — 1101F PT FALLS ASSESS-DOCD LE1/YR: CPT | Mod: CPTII,S$GLB,, | Performed by: HOSPITALIST

## 2021-05-19 PROCEDURE — 3288F PR FALLS RISK ASSESSMENT DOCUMENTED: ICD-10-PCS | Mod: CPTII,S$GLB,, | Performed by: HOSPITALIST

## 2021-05-19 PROCEDURE — 99214 PR OFFICE/OUTPT VISIT, EST, LEVL IV, 30-39 MIN: ICD-10-PCS | Mod: S$GLB,,, | Performed by: HOSPITALIST

## 2021-05-19 PROCEDURE — 99999 PR PBB SHADOW E&M-EST. PATIENT-LVL IV: CPT | Mod: PBBFAC,,, | Performed by: HOSPITALIST

## 2021-05-19 PROCEDURE — 1126F AMNT PAIN NOTED NONE PRSNT: CPT | Mod: S$GLB,,, | Performed by: HOSPITALIST

## 2021-05-19 PROCEDURE — 3288F FALL RISK ASSESSMENT DOCD: CPT | Mod: CPTII,S$GLB,, | Performed by: HOSPITALIST

## 2021-05-19 PROCEDURE — 1126F PR PAIN SEVERITY QUANTIFIED, NO PAIN PRESENT: ICD-10-PCS | Mod: S$GLB,,, | Performed by: HOSPITALIST

## 2021-05-19 PROCEDURE — 1159F MED LIST DOCD IN RCRD: CPT | Mod: S$GLB,,, | Performed by: HOSPITALIST

## 2021-05-19 PROCEDURE — 1159F PR MEDICATION LIST DOCUMENTED IN MEDICAL RECORD: ICD-10-PCS | Mod: S$GLB,,, | Performed by: HOSPITALIST

## 2021-05-19 PROCEDURE — 99999 PR PBB SHADOW E&M-EST. PATIENT-LVL IV: ICD-10-PCS | Mod: PBBFAC,,, | Performed by: HOSPITALIST

## 2021-05-19 PROCEDURE — 99214 OFFICE O/P EST MOD 30 MIN: CPT | Mod: S$GLB,,, | Performed by: HOSPITALIST

## 2021-05-23 ENCOUNTER — PATIENT MESSAGE (OUTPATIENT)
Dept: INTERNAL MEDICINE | Facility: CLINIC | Age: 76
End: 2021-05-23

## 2021-05-27 DIAGNOSIS — I48.91 ATRIAL FIBRILLATION WITH RVR: Primary | ICD-10-CM

## 2021-06-07 ENCOUNTER — OFFICE VISIT (OUTPATIENT)
Dept: CARDIOLOGY | Facility: CLINIC | Age: 76
End: 2021-06-07
Payer: MEDICARE

## 2021-06-07 VITALS
DIASTOLIC BLOOD PRESSURE: 66 MMHG | HEIGHT: 63 IN | HEART RATE: 94 BPM | WEIGHT: 175.25 LBS | SYSTOLIC BLOOD PRESSURE: 134 MMHG | BODY MASS INDEX: 31.05 KG/M2

## 2021-06-07 DIAGNOSIS — I48.0 PAROXYSMAL ATRIAL FIBRILLATION: ICD-10-CM

## 2021-06-07 DIAGNOSIS — Z85.3 HISTORY OF BREAST CANCER: ICD-10-CM

## 2021-06-07 DIAGNOSIS — E66.9 OBESITY (BMI 30.0-34.9): ICD-10-CM

## 2021-06-07 DIAGNOSIS — N18.32 STAGE 3B CHRONIC KIDNEY DISEASE: ICD-10-CM

## 2021-06-07 DIAGNOSIS — E78.5 HYPERLIPIDEMIA, UNSPECIFIED HYPERLIPIDEMIA TYPE: ICD-10-CM

## 2021-06-07 DIAGNOSIS — I83.893 VARICOSE VEINS OF BOTH LEGS WITH EDEMA: ICD-10-CM

## 2021-06-07 DIAGNOSIS — I10 ESSENTIAL HYPERTENSION: ICD-10-CM

## 2021-06-07 DIAGNOSIS — N28.89 RIGHT RENAL MASS: ICD-10-CM

## 2021-06-07 DIAGNOSIS — I70.0 AORTIC ATHEROSCLEROSIS: ICD-10-CM

## 2021-06-07 DIAGNOSIS — I51.89 LEFT VENTRICULAR DIASTOLIC DYSFUNCTION WITH PRESERVED SYSTOLIC FUNCTION: Primary | ICD-10-CM

## 2021-06-07 PROCEDURE — 1159F PR MEDICATION LIST DOCUMENTED IN MEDICAL RECORD: ICD-10-PCS | Mod: S$GLB,,, | Performed by: INTERNAL MEDICINE

## 2021-06-07 PROCEDURE — 1159F MED LIST DOCD IN RCRD: CPT | Mod: S$GLB,,, | Performed by: INTERNAL MEDICINE

## 2021-06-07 PROCEDURE — 99204 PR OFFICE/OUTPT VISIT, NEW, LEVL IV, 45-59 MIN: ICD-10-PCS | Mod: S$GLB,,, | Performed by: INTERNAL MEDICINE

## 2021-06-07 PROCEDURE — 1126F AMNT PAIN NOTED NONE PRSNT: CPT | Mod: S$GLB,,, | Performed by: INTERNAL MEDICINE

## 2021-06-07 PROCEDURE — 99999 PR PBB SHADOW E&M-EST. PATIENT-LVL IV: ICD-10-PCS | Mod: PBBFAC,,, | Performed by: INTERNAL MEDICINE

## 2021-06-07 PROCEDURE — 99204 OFFICE O/P NEW MOD 45 MIN: CPT | Mod: S$GLB,,, | Performed by: INTERNAL MEDICINE

## 2021-06-07 PROCEDURE — 99999 PR PBB SHADOW E&M-EST. PATIENT-LVL IV: CPT | Mod: PBBFAC,,, | Performed by: INTERNAL MEDICINE

## 2021-06-07 PROCEDURE — 1126F PR PAIN SEVERITY QUANTIFIED, NO PAIN PRESENT: ICD-10-PCS | Mod: S$GLB,,, | Performed by: INTERNAL MEDICINE

## 2021-06-07 RX ORDER — METOPROLOL SUCCINATE 100 MG/1
100 TABLET, EXTENDED RELEASE ORAL DAILY
Qty: 30 TABLET | Refills: 11 | Status: SHIPPED | OUTPATIENT
Start: 2021-06-07 | End: 2021-09-13 | Stop reason: SDUPTHER

## 2021-06-09 ENCOUNTER — HOSPITAL ENCOUNTER (OUTPATIENT)
Dept: CARDIOLOGY | Facility: HOSPITAL | Age: 76
Discharge: HOME | End: 2021-06-09
Attending: INTERNAL MEDICINE
Payer: MEDICARE

## 2021-06-09 VITALS
BODY MASS INDEX: 31.01 KG/M2 | HEART RATE: 76 BPM | HEIGHT: 63 IN | WEIGHT: 175 LBS | SYSTOLIC BLOOD PRESSURE: 134 MMHG | DIASTOLIC BLOOD PRESSURE: 66 MMHG

## 2021-06-09 DIAGNOSIS — I48.0 PAROXYSMAL ATRIAL FIBRILLATION: ICD-10-CM

## 2021-06-09 LAB
ASCENDING AORTA: 2.3 CM
AV INDEX (PROSTH): 0.7
AV MEAN GRADIENT: 5 MMHG
AV PEAK GRADIENT: 9 MMHG
AV VALVE AREA: 2.12 CM2
AV VELOCITY RATIO: 0.74
BSA FOR ECHO PROCEDURE: 1.88 M2
CV ECHO LV RWT: 0.41 CM
DOP CALC AO PEAK VEL: 1.48 M/S
DOP CALC AO VTI: 32.89 CM
DOP CALC LVOT AREA: 3 CM2
DOP CALC LVOT DIAMETER: 1.97 CM
DOP CALC LVOT PEAK VEL: 1.09 M/S
DOP CALC LVOT STROKE VOLUME: 69.7 CM3
DOP CALCLVOT PEAK VEL VTI: 22.88 CM
E WAVE DECELERATION TIME: 255.77 MSEC
E/A RATIO: 0.86
E/E' RATIO: 17 M/S
ECHO LV POSTERIOR WALL: 0.78 CM (ref 0.6–1.1)
EJECTION FRACTION: 65 %
FRACTIONAL SHORTENING: 34 % (ref 28–44)
INTERVENTRICULAR SEPTUM: 0.91 CM (ref 0.6–1.1)
IVRT: 79.92 MSEC
LA MAJOR: 5.31 CM
LA MINOR: 5.35 CM
LA WIDTH: 3.83 CM
LEFT ATRIUM SIZE: 3.5 CM
LEFT ATRIUM VOLUME INDEX MOD: 24.4 ML/M2
LEFT ATRIUM VOLUME INDEX: 33.2 ML/M2
LEFT ATRIUM VOLUME MOD: 44.65 CM3
LEFT ATRIUM VOLUME: 60.73 CM3
LEFT INTERNAL DIMENSION IN SYSTOLE: 2.52 CM (ref 2.1–4)
LEFT VENTRICLE DIASTOLIC VOLUME INDEX: 34.27 ML/M2
LEFT VENTRICLE DIASTOLIC VOLUME: 62.72 ML
LEFT VENTRICLE MASS INDEX: 51 G/M2
LEFT VENTRICLE SYSTOLIC VOLUME INDEX: 12.4 ML/M2
LEFT VENTRICLE SYSTOLIC VOLUME: 22.7 ML
LEFT VENTRICULAR INTERNAL DIMENSION IN DIASTOLE: 3.82 CM (ref 3.5–6)
LEFT VENTRICULAR MASS: 93.4 G
LV LATERAL E/E' RATIO: 17 M/S
LV SEPTAL E/E' RATIO: 17 M/S
MV A" WAVE DURATION": 7.14 MSEC
MV PEAK A VEL: 1.39 M/S
MV PEAK E VEL: 1.19 M/S
MV STENOSIS PRESSURE HALF TIME: 74.17 MS
MV VALVE AREA P 1/2 METHOD: 2.97 CM2
PISA TR MAX VEL: 2.97 M/S
PULM VEIN S/D RATIO: 0.54
PV PEAK D VEL: 0.54 M/S
PV PEAK S VEL: 0.29 M/S
RA MAJOR: 4.78 CM
RA PRESSURE: 3 MMHG
RA WIDTH: 3.26 CM
RIGHT VENTRICULAR END-DIASTOLIC DIMENSION: 3.52 CM
RV TISSUE DOPPLER FREE WALL SYSTOLIC VELOCITY 1 (APICAL 4 CHAMBER VIEW): 13.9 CM/S
SINUS: 2.92 CM
STJ: 2.24 CM
TDI LATERAL: 0.07 M/S
TDI SEPTAL: 0.07 M/S
TDI: 0.07 M/S
TR MAX PG: 35 MMHG
TRICUSPID ANNULAR PLANE SYSTOLIC EXCURSION: 2.03 CM
TV REST PULMONARY ARTERY PRESSURE: 38 MMHG

## 2021-06-09 PROCEDURE — 93306 ECHO (CUPID ONLY): ICD-10-PCS | Mod: 26,,, | Performed by: INTERNAL MEDICINE

## 2021-06-09 PROCEDURE — 93306 TTE W/DOPPLER COMPLETE: CPT | Mod: 26,,, | Performed by: INTERNAL MEDICINE

## 2021-06-09 PROCEDURE — 93306 TTE W/DOPPLER COMPLETE: CPT

## 2021-06-14 ENCOUNTER — LAB VISIT (OUTPATIENT)
Dept: LAB | Facility: HOSPITAL | Age: 76
End: 2021-06-14
Attending: INTERNAL MEDICINE
Payer: MEDICARE

## 2021-06-14 ENCOUNTER — TELEPHONE (OUTPATIENT)
Dept: CARDIOLOGY | Facility: HOSPITAL | Age: 76
End: 2021-06-14

## 2021-06-14 DIAGNOSIS — Z00.00 ANNUAL PHYSICAL EXAM: ICD-10-CM

## 2021-06-14 DIAGNOSIS — E78.5 HYPERLIPIDEMIA, UNSPECIFIED HYPERLIPIDEMIA TYPE: ICD-10-CM

## 2021-06-14 DIAGNOSIS — E66.9 OBESITY (BMI 30-39.9): ICD-10-CM

## 2021-06-14 LAB
ALBUMIN SERPL BCP-MCNC: 3.6 G/DL (ref 3.5–5.2)
ALP SERPL-CCNC: 100 U/L (ref 55–135)
ALT SERPL W/O P-5'-P-CCNC: 22 U/L (ref 10–44)
ANION GAP SERPL CALC-SCNC: 11 MMOL/L (ref 8–16)
AST SERPL-CCNC: 31 U/L (ref 10–40)
BASOPHILS # BLD AUTO: 0.09 K/UL (ref 0–0.2)
BASOPHILS NFR BLD: 1.3 % (ref 0–1.9)
BILIRUB SERPL-MCNC: 0.7 MG/DL (ref 0.1–1)
BUN SERPL-MCNC: 21 MG/DL (ref 8–23)
CALCIUM SERPL-MCNC: 10.6 MG/DL (ref 8.7–10.5)
CHLORIDE SERPL-SCNC: 108 MMOL/L (ref 95–110)
CHOLEST SERPL-MCNC: 140 MG/DL (ref 120–199)
CHOLEST/HDLC SERPL: 2.2 {RATIO} (ref 2–5)
CO2 SERPL-SCNC: 22 MMOL/L (ref 23–29)
CREAT SERPL-MCNC: 1.5 MG/DL (ref 0.5–1.4)
DIFFERENTIAL METHOD: ABNORMAL
EOSINOPHIL # BLD AUTO: 0.2 K/UL (ref 0–0.5)
EOSINOPHIL NFR BLD: 2.7 % (ref 0–8)
ERYTHROCYTE [DISTWIDTH] IN BLOOD BY AUTOMATED COUNT: 13.2 % (ref 11.5–14.5)
EST. GFR  (AFRICAN AMERICAN): 38.5 ML/MIN/1.73 M^2
EST. GFR  (NON AFRICAN AMERICAN): 33.4 ML/MIN/1.73 M^2
GLUCOSE SERPL-MCNC: 92 MG/DL (ref 70–110)
HCT VFR BLD AUTO: 46.2 % (ref 37–48.5)
HDLC SERPL-MCNC: 65 MG/DL (ref 40–75)
HDLC SERPL: 46.4 % (ref 20–50)
HGB BLD-MCNC: 14.7 G/DL (ref 12–16)
IMM GRANULOCYTES # BLD AUTO: 0.01 K/UL (ref 0–0.04)
IMM GRANULOCYTES NFR BLD AUTO: 0.1 % (ref 0–0.5)
LDLC SERPL CALC-MCNC: 49.4 MG/DL (ref 63–159)
LYMPHOCYTES # BLD AUTO: 2 K/UL (ref 1–4.8)
LYMPHOCYTES NFR BLD: 29.1 % (ref 18–48)
MCH RBC QN AUTO: 30 PG (ref 27–31)
MCHC RBC AUTO-ENTMCNC: 31.8 G/DL (ref 32–36)
MCV RBC AUTO: 94 FL (ref 82–98)
MONOCYTES # BLD AUTO: 0.8 K/UL (ref 0.3–1)
MONOCYTES NFR BLD: 11.7 % (ref 4–15)
NEUTROPHILS # BLD AUTO: 3.8 K/UL (ref 1.8–7.7)
NEUTROPHILS NFR BLD: 55.1 % (ref 38–73)
NONHDLC SERPL-MCNC: 75 MG/DL
NRBC BLD-RTO: 0 /100 WBC
PLATELET # BLD AUTO: 206 K/UL (ref 150–450)
PMV BLD AUTO: 10.2 FL (ref 9.2–12.9)
POTASSIUM SERPL-SCNC: 4.2 MMOL/L (ref 3.5–5.1)
PROT SERPL-MCNC: 6.9 G/DL (ref 6–8.4)
RBC # BLD AUTO: 4.9 M/UL (ref 4–5.4)
SODIUM SERPL-SCNC: 141 MMOL/L (ref 136–145)
TRIGL SERPL-MCNC: 128 MG/DL (ref 30–150)
WBC # BLD AUTO: 6.95 K/UL (ref 3.9–12.7)

## 2021-06-14 PROCEDURE — 80053 COMPREHEN METABOLIC PANEL: CPT | Performed by: INTERNAL MEDICINE

## 2021-06-14 PROCEDURE — 80061 LIPID PANEL: CPT | Performed by: INTERNAL MEDICINE

## 2021-06-14 PROCEDURE — 36415 COLL VENOUS BLD VENIPUNCTURE: CPT | Mod: PO | Performed by: INTERNAL MEDICINE

## 2021-06-14 PROCEDURE — 85025 COMPLETE CBC W/AUTO DIFF WBC: CPT | Performed by: INTERNAL MEDICINE

## 2021-06-22 ENCOUNTER — OFFICE VISIT (OUTPATIENT)
Dept: HOME HEALTH SERVICES | Facility: CLINIC | Age: 76
End: 2021-06-22
Payer: MEDICARE

## 2021-06-22 VITALS
HEIGHT: 63 IN | TEMPERATURE: 98 F | WEIGHT: 177 LBS | BODY MASS INDEX: 31.36 KG/M2 | OXYGEN SATURATION: 92 % | DIASTOLIC BLOOD PRESSURE: 68 MMHG | HEART RATE: 64 BPM | SYSTOLIC BLOOD PRESSURE: 126 MMHG

## 2021-06-22 DIAGNOSIS — I48.0 PAROXYSMAL ATRIAL FIBRILLATION: ICD-10-CM

## 2021-06-22 DIAGNOSIS — R79.81 ABNORMAL PULSE OXIMETRY: ICD-10-CM

## 2021-06-22 DIAGNOSIS — Z00.00 ENCOUNTER FOR PREVENTIVE HEALTH EXAMINATION: Primary | ICD-10-CM

## 2021-06-22 DIAGNOSIS — G62.0 POLYNEUROPATHY FOLLOWING CHEMOTHERAPY: ICD-10-CM

## 2021-06-22 DIAGNOSIS — T45.1X5A POLYNEUROPATHY FOLLOWING CHEMOTHERAPY: ICD-10-CM

## 2021-06-22 DIAGNOSIS — E66.9 OBESITY (BMI 30.0-34.9): ICD-10-CM

## 2021-06-22 DIAGNOSIS — I50.32 CHRONIC DIASTOLIC HEART FAILURE: ICD-10-CM

## 2021-06-22 DIAGNOSIS — J84.10 CALCIFIED GRANULOMA OF LUNG: ICD-10-CM

## 2021-06-22 DIAGNOSIS — I10 ESSENTIAL HYPERTENSION: ICD-10-CM

## 2021-06-22 DIAGNOSIS — I70.0 AORTIC ATHEROSCLEROSIS: ICD-10-CM

## 2021-06-22 DIAGNOSIS — N25.81 HYPERPARATHYROIDISM DUE TO RENAL INSUFFICIENCY: ICD-10-CM

## 2021-06-22 DIAGNOSIS — N18.32 STAGE 3B CHRONIC KIDNEY DISEASE: ICD-10-CM

## 2021-06-22 DIAGNOSIS — J43.2 CENTRILOBULAR EMPHYSEMA: ICD-10-CM

## 2021-06-22 DIAGNOSIS — Z85.3 HISTORY OF BREAST CANCER: ICD-10-CM

## 2021-06-22 DIAGNOSIS — E78.5 HYPERLIPIDEMIA, UNSPECIFIED HYPERLIPIDEMIA TYPE: ICD-10-CM

## 2021-06-22 PROCEDURE — 99499 RISK ADDL DX/OHS AUDIT: ICD-10-PCS | Mod: S$GLB,,, | Performed by: NURSE PRACTITIONER

## 2021-06-22 PROCEDURE — 1126F AMNT PAIN NOTED NONE PRSNT: CPT | Mod: S$GLB,,, | Performed by: NURSE PRACTITIONER

## 2021-06-22 PROCEDURE — 99499 UNLISTED E&M SERVICE: CPT | Mod: S$GLB,,, | Performed by: NURSE PRACTITIONER

## 2021-06-22 PROCEDURE — 3288F FALL RISK ASSESSMENT DOCD: CPT | Mod: CPTII,S$GLB,, | Performed by: NURSE PRACTITIONER

## 2021-06-22 PROCEDURE — 1101F PT FALLS ASSESS-DOCD LE1/YR: CPT | Mod: CPTII,S$GLB,, | Performed by: NURSE PRACTITIONER

## 2021-06-22 PROCEDURE — 1126F PR PAIN SEVERITY QUANTIFIED, NO PAIN PRESENT: ICD-10-PCS | Mod: S$GLB,,, | Performed by: NURSE PRACTITIONER

## 2021-06-22 PROCEDURE — 1124F PR ADV CARE PLAN DISCUSSED, UNABLE/UNWILL DOC PLAN OR SURROGATE: ICD-10-PCS | Mod: S$GLB,,, | Performed by: NURSE PRACTITIONER

## 2021-06-22 PROCEDURE — 1124F ACP DISCUSS-NO DSCNMKR DOCD: CPT | Mod: S$GLB,,, | Performed by: NURSE PRACTITIONER

## 2021-06-22 PROCEDURE — 1101F PR PT FALLS ASSESS DOC 0-1 FALLS W/OUT INJ PAST YR: ICD-10-PCS | Mod: CPTII,S$GLB,, | Performed by: NURSE PRACTITIONER

## 2021-06-22 PROCEDURE — 3288F PR FALLS RISK ASSESSMENT DOCUMENTED: ICD-10-PCS | Mod: CPTII,S$GLB,, | Performed by: NURSE PRACTITIONER

## 2021-06-22 PROCEDURE — G0439 PPPS, SUBSEQ VISIT: HCPCS | Mod: S$GLB,,, | Performed by: NURSE PRACTITIONER

## 2021-06-22 PROCEDURE — G0439 PR MEDICARE ANNUAL WELLNESS SUBSEQUENT VISIT: ICD-10-PCS | Mod: S$GLB,,, | Performed by: NURSE PRACTITIONER

## 2021-06-23 ENCOUNTER — OFFICE VISIT (OUTPATIENT)
Dept: CARDIOLOGY | Facility: CLINIC | Age: 76
End: 2021-06-23
Payer: MEDICARE

## 2021-06-23 ENCOUNTER — HOSPITAL ENCOUNTER (INPATIENT)
Facility: HOSPITAL | Age: 76
LOS: 3 days | Discharge: HOME | DRG: 291 | End: 2021-06-28
Attending: EMERGENCY MEDICINE | Admitting: HOSPITALIST
Payer: MEDICARE

## 2021-06-23 VITALS
HEIGHT: 63 IN | BODY MASS INDEX: 31.84 KG/M2 | WEIGHT: 179.69 LBS | HEART RATE: 65 BPM | OXYGEN SATURATION: 88 % | SYSTOLIC BLOOD PRESSURE: 134 MMHG | DIASTOLIC BLOOD PRESSURE: 70 MMHG

## 2021-06-23 DIAGNOSIS — R06.02 SOB (SHORTNESS OF BREATH): ICD-10-CM

## 2021-06-23 DIAGNOSIS — I48.0 PAROXYSMAL ATRIAL FIBRILLATION: ICD-10-CM

## 2021-06-23 DIAGNOSIS — I48.91 ATRIAL FIBRILLATION WITH RVR: ICD-10-CM

## 2021-06-23 DIAGNOSIS — E66.9 OBESITY (BMI 30.0-34.9): ICD-10-CM

## 2021-06-23 DIAGNOSIS — I83.893 VARICOSE VEINS OF BOTH LEGS WITH EDEMA: ICD-10-CM

## 2021-06-23 DIAGNOSIS — E78.5 HYPERLIPIDEMIA, UNSPECIFIED HYPERLIPIDEMIA TYPE: ICD-10-CM

## 2021-06-23 DIAGNOSIS — J90 PLEURAL EFFUSION: ICD-10-CM

## 2021-06-23 DIAGNOSIS — J43.2 CENTRILOBULAR EMPHYSEMA: ICD-10-CM

## 2021-06-23 DIAGNOSIS — J96.01 ACUTE HYPOXEMIC RESPIRATORY FAILURE: Primary | ICD-10-CM

## 2021-06-23 DIAGNOSIS — N18.32 STAGE 3B CHRONIC KIDNEY DISEASE: ICD-10-CM

## 2021-06-23 DIAGNOSIS — I50.32 CHRONIC DIASTOLIC HEART FAILURE: ICD-10-CM

## 2021-06-23 DIAGNOSIS — I10 ESSENTIAL HYPERTENSION: ICD-10-CM

## 2021-06-23 DIAGNOSIS — R60.0 BILATERAL LEG EDEMA: ICD-10-CM

## 2021-06-23 DIAGNOSIS — R91.8 PULMONARY NODULES: ICD-10-CM

## 2021-06-23 DIAGNOSIS — M79.89 LEG SWELLING: ICD-10-CM

## 2021-06-23 DIAGNOSIS — R06.02 SHORTNESS OF BREATH: Primary | ICD-10-CM

## 2021-06-23 DIAGNOSIS — R09.89 PULMONARY VASCULAR CONGESTION: ICD-10-CM

## 2021-06-23 DIAGNOSIS — I49.9 ARRHYTHMIA: ICD-10-CM

## 2021-06-23 PROBLEM — I50.33 ACUTE ON CHRONIC DIASTOLIC HEART FAILURE: Status: ACTIVE | Noted: 2019-12-24

## 2021-06-23 PROBLEM — R94.31 ABNORMAL ECG: Status: RESOLVED | Noted: 2017-05-17 | Resolved: 2021-06-23

## 2021-06-23 LAB
ALBUMIN SERPL BCP-MCNC: 3.5 G/DL (ref 3.5–5.2)
ALP SERPL-CCNC: 96 U/L (ref 55–135)
ALT SERPL W/O P-5'-P-CCNC: 25 U/L (ref 10–44)
ANION GAP SERPL CALC-SCNC: 10 MMOL/L (ref 8–16)
AST SERPL-CCNC: 30 U/L (ref 10–40)
BASOPHILS # BLD AUTO: 0.07 K/UL (ref 0–0.2)
BASOPHILS NFR BLD: 1 % (ref 0–1.9)
BILIRUB SERPL-MCNC: 0.8 MG/DL (ref 0.1–1)
BNP SERPL-MCNC: 599 PG/ML (ref 0–99)
BUN SERPL-MCNC: 20 MG/DL (ref 8–23)
CALCIUM SERPL-MCNC: 10.3 MG/DL (ref 8.7–10.5)
CHLORIDE SERPL-SCNC: 109 MMOL/L (ref 95–110)
CO2 SERPL-SCNC: 23 MMOL/L (ref 23–29)
CREAT SERPL-MCNC: 1.3 MG/DL (ref 0.5–1.4)
CTP QC/QA: YES
DIFFERENTIAL METHOD: NORMAL
EOSINOPHIL # BLD AUTO: 0.3 K/UL (ref 0–0.5)
EOSINOPHIL NFR BLD: 4.3 % (ref 0–8)
ERYTHROCYTE [DISTWIDTH] IN BLOOD BY AUTOMATED COUNT: 13.1 % (ref 11.5–14.5)
EST. GFR  (AFRICAN AMERICAN): 45.7 ML/MIN/1.73 M^2
EST. GFR  (NON AFRICAN AMERICAN): 39.7 ML/MIN/1.73 M^2
GLUCOSE SERPL-MCNC: 103 MG/DL (ref 70–110)
HCT VFR BLD AUTO: 42.2 % (ref 37–48.5)
HGB BLD-MCNC: 13.8 G/DL (ref 12–16)
IMM GRANULOCYTES # BLD AUTO: 0.01 K/UL (ref 0–0.04)
IMM GRANULOCYTES NFR BLD AUTO: 0.1 % (ref 0–0.5)
LYMPHOCYTES # BLD AUTO: 2 K/UL (ref 1–4.8)
LYMPHOCYTES NFR BLD: 30.1 % (ref 18–48)
MCH RBC QN AUTO: 29.7 PG (ref 27–31)
MCHC RBC AUTO-ENTMCNC: 32.7 G/DL (ref 32–36)
MCV RBC AUTO: 91 FL (ref 82–98)
MONOCYTES # BLD AUTO: 0.8 K/UL (ref 0.3–1)
MONOCYTES NFR BLD: 11.7 % (ref 4–15)
NEUTROPHILS # BLD AUTO: 3.5 K/UL (ref 1.8–7.7)
NEUTROPHILS NFR BLD: 52.8 % (ref 38–73)
NRBC BLD-RTO: 0 /100 WBC
PLATELET # BLD AUTO: 196 K/UL (ref 150–450)
PMV BLD AUTO: 10.1 FL (ref 9.2–12.9)
POTASSIUM SERPL-SCNC: 4.3 MMOL/L (ref 3.5–5.1)
PROT SERPL-MCNC: 6.7 G/DL (ref 6–8.4)
RBC # BLD AUTO: 4.65 M/UL (ref 4–5.4)
SARS-COV-2 RDRP RESP QL NAA+PROBE: NEGATIVE
SODIUM SERPL-SCNC: 142 MMOL/L (ref 136–145)
TROPONIN I SERPL DL<=0.01 NG/ML-MCNC: 0.01 NG/ML (ref 0–0.03)
WBC # BLD AUTO: 6.68 K/UL (ref 3.9–12.7)

## 2021-06-23 PROCEDURE — 1159F MED LIST DOCD IN RCRD: CPT | Mod: S$GLB,,, | Performed by: PHYSICIAN ASSISTANT

## 2021-06-23 PROCEDURE — 99285 PR EMERGENCY DEPT VISIT,LEVEL V: ICD-10-PCS | Mod: CS,,, | Performed by: PHYSICIAN ASSISTANT

## 2021-06-23 PROCEDURE — G0378 HOSPITAL OBSERVATION PER HR: HCPCS

## 2021-06-23 PROCEDURE — 99499 RISK ADDL DX/OHS AUDIT: ICD-10-PCS | Mod: S$GLB,,, | Performed by: PHYSICIAN ASSISTANT

## 2021-06-23 PROCEDURE — 1126F PR PAIN SEVERITY QUANTIFIED, NO PAIN PRESENT: ICD-10-PCS | Mod: S$GLB,,, | Performed by: PHYSICIAN ASSISTANT

## 2021-06-23 PROCEDURE — 1159F PR MEDICATION LIST DOCUMENTED IN MEDICAL RECORD: ICD-10-PCS | Mod: S$GLB,,, | Performed by: PHYSICIAN ASSISTANT

## 2021-06-23 PROCEDURE — 99999 PR PBB SHADOW E&M-EST. PATIENT-LVL IV: ICD-10-PCS | Mod: PBBFAC,,, | Performed by: PHYSICIAN ASSISTANT

## 2021-06-23 PROCEDURE — 1126F AMNT PAIN NOTED NONE PRSNT: CPT | Mod: S$GLB,,, | Performed by: PHYSICIAN ASSISTANT

## 2021-06-23 PROCEDURE — 99499 UNLISTED E&M SERVICE: CPT | Mod: S$GLB,,, | Performed by: PHYSICIAN ASSISTANT

## 2021-06-23 PROCEDURE — 85025 COMPLETE CBC W/AUTO DIFF WBC: CPT | Performed by: PHYSICIAN ASSISTANT

## 2021-06-23 PROCEDURE — U0002 COVID-19 LAB TEST NON-CDC: HCPCS | Performed by: PHYSICIAN ASSISTANT

## 2021-06-23 PROCEDURE — 99285 EMERGENCY DEPT VISIT HI MDM: CPT | Mod: 25

## 2021-06-23 PROCEDURE — 99220 PR INITIAL OBSERVATION CARE,LEVL III: CPT | Mod: ,,, | Performed by: HOSPITALIST

## 2021-06-23 PROCEDURE — 99999 PR PBB SHADOW E&M-EST. PATIENT-LVL IV: CPT | Mod: PBBFAC,,, | Performed by: PHYSICIAN ASSISTANT

## 2021-06-23 PROCEDURE — 99215 PR OFFICE/OUTPT VISIT, EST, LEVL V, 40-54 MIN: ICD-10-PCS | Mod: S$GLB,,, | Performed by: PHYSICIAN ASSISTANT

## 2021-06-23 PROCEDURE — 83880 ASSAY OF NATRIURETIC PEPTIDE: CPT | Performed by: PHYSICIAN ASSISTANT

## 2021-06-23 PROCEDURE — 99215 OFFICE O/P EST HI 40 MIN: CPT | Mod: S$GLB,,, | Performed by: PHYSICIAN ASSISTANT

## 2021-06-23 PROCEDURE — 99220 PR INITIAL OBSERVATION CARE,LEVL III: ICD-10-PCS | Mod: ,,, | Performed by: HOSPITALIST

## 2021-06-23 PROCEDURE — 99285 EMERGENCY DEPT VISIT HI MDM: CPT | Mod: CS,,, | Performed by: PHYSICIAN ASSISTANT

## 2021-06-23 PROCEDURE — 84484 ASSAY OF TROPONIN QUANT: CPT | Performed by: PHYSICIAN ASSISTANT

## 2021-06-23 PROCEDURE — 93005 ELECTROCARDIOGRAM TRACING: CPT

## 2021-06-23 PROCEDURE — 93010 EKG 12-LEAD: ICD-10-PCS | Mod: ,,, | Performed by: INTERNAL MEDICINE

## 2021-06-23 PROCEDURE — 80053 COMPREHEN METABOLIC PANEL: CPT | Performed by: PHYSICIAN ASSISTANT

## 2021-06-23 PROCEDURE — 63600175 PHARM REV CODE 636 W HCPCS: Performed by: PHYSICIAN ASSISTANT

## 2021-06-23 PROCEDURE — 96374 THER/PROPH/DIAG INJ IV PUSH: CPT

## 2021-06-23 PROCEDURE — 25500020 PHARM REV CODE 255: Performed by: EMERGENCY MEDICINE

## 2021-06-23 PROCEDURE — 93010 ELECTROCARDIOGRAM REPORT: CPT | Mod: ,,, | Performed by: INTERNAL MEDICINE

## 2021-06-23 RX ORDER — ONDANSETRON 2 MG/ML
4 INJECTION INTRAMUSCULAR; INTRAVENOUS EVERY 8 HOURS PRN
Status: DISCONTINUED | OUTPATIENT
Start: 2021-06-23 | End: 2021-06-29 | Stop reason: HOSPADM

## 2021-06-23 RX ORDER — LOSARTAN POTASSIUM 50 MG/1
50 TABLET ORAL DAILY
Status: DISCONTINUED | OUTPATIENT
Start: 2021-06-24 | End: 2021-06-25

## 2021-06-23 RX ORDER — IBUPROFEN 200 MG
16 TABLET ORAL
Status: DISCONTINUED | OUTPATIENT
Start: 2021-06-23 | End: 2021-06-29 | Stop reason: HOSPADM

## 2021-06-23 RX ORDER — PROPOFOL 10 MG/ML
INJECTION, EMULSION INTRAVENOUS
Status: COMPLETED
Start: 2021-06-23 | End: 2021-06-23

## 2021-06-23 RX ORDER — ATORVASTATIN CALCIUM 20 MG/1
20 TABLET, FILM COATED ORAL DAILY
Status: DISCONTINUED | OUTPATIENT
Start: 2021-06-24 | End: 2021-06-29 | Stop reason: HOSPADM

## 2021-06-23 RX ORDER — AMLODIPINE BESYLATE 5 MG/1
5 TABLET ORAL DAILY
Status: DISCONTINUED | OUTPATIENT
Start: 2021-06-24 | End: 2021-06-25

## 2021-06-23 RX ORDER — ACETAMINOPHEN 325 MG/1
650 TABLET ORAL EVERY 4 HOURS PRN
Status: DISCONTINUED | OUTPATIENT
Start: 2021-06-23 | End: 2021-06-29 | Stop reason: HOSPADM

## 2021-06-23 RX ORDER — IPRATROPIUM BROMIDE AND ALBUTEROL SULFATE 2.5; .5 MG/3ML; MG/3ML
3 SOLUTION RESPIRATORY (INHALATION) EVERY 6 HOURS PRN
Status: DISCONTINUED | OUTPATIENT
Start: 2021-06-23 | End: 2021-06-28

## 2021-06-23 RX ORDER — FUROSEMIDE 10 MG/ML
60 INJECTION INTRAMUSCULAR; INTRAVENOUS 2 TIMES DAILY
Status: DISCONTINUED | OUTPATIENT
Start: 2021-06-24 | End: 2021-06-25

## 2021-06-23 RX ORDER — IBUPROFEN 200 MG
24 TABLET ORAL
Status: DISCONTINUED | OUTPATIENT
Start: 2021-06-23 | End: 2021-06-29 | Stop reason: HOSPADM

## 2021-06-23 RX ORDER — SODIUM CHLORIDE 0.9 % (FLUSH) 0.9 %
10 SYRINGE (ML) INJECTION
Status: DISCONTINUED | OUTPATIENT
Start: 2021-06-23 | End: 2021-06-29 | Stop reason: HOSPADM

## 2021-06-23 RX ORDER — TALC
6 POWDER (GRAM) TOPICAL NIGHTLY PRN
Status: DISCONTINUED | OUTPATIENT
Start: 2021-06-23 | End: 2021-06-29 | Stop reason: HOSPADM

## 2021-06-23 RX ORDER — ASPIRIN 81 MG/1
81 TABLET ORAL DAILY
Status: DISCONTINUED | OUTPATIENT
Start: 2021-06-24 | End: 2021-06-29 | Stop reason: HOSPADM

## 2021-06-23 RX ORDER — METOPROLOL SUCCINATE 100 MG/1
100 TABLET, EXTENDED RELEASE ORAL DAILY
Status: DISCONTINUED | OUTPATIENT
Start: 2021-06-24 | End: 2021-06-29 | Stop reason: HOSPADM

## 2021-06-23 RX ORDER — FUROSEMIDE 10 MG/ML
40 INJECTION INTRAMUSCULAR; INTRAVENOUS
Status: COMPLETED | OUTPATIENT
Start: 2021-06-23 | End: 2021-06-23

## 2021-06-23 RX ORDER — PROCHLORPERAZINE EDISYLATE 5 MG/ML
5 INJECTION INTRAMUSCULAR; INTRAVENOUS EVERY 6 HOURS PRN
Status: DISCONTINUED | OUTPATIENT
Start: 2021-06-23 | End: 2021-06-29 | Stop reason: HOSPADM

## 2021-06-23 RX ADMIN — IOHEXOL 100 ML: 350 INJECTION, SOLUTION INTRAVENOUS at 05:06

## 2021-06-23 RX ADMIN — FUROSEMIDE 40 MG: 10 INJECTION, SOLUTION INTRAMUSCULAR; INTRAVENOUS at 04:06

## 2021-06-24 PROBLEM — N13.30 HYDRONEPHROSIS: Status: ACTIVE | Noted: 2021-06-24

## 2021-06-24 LAB
ANION GAP SERPL CALC-SCNC: 10 MMOL/L (ref 8–16)
BASOPHILS # BLD AUTO: 0.07 K/UL (ref 0–0.2)
BASOPHILS NFR BLD: 0.9 % (ref 0–1.9)
BILIRUB UR QL STRIP: NEGATIVE
BUN SERPL-MCNC: 18 MG/DL (ref 8–23)
CALCIUM SERPL-MCNC: 10.1 MG/DL (ref 8.7–10.5)
CHLORIDE SERPL-SCNC: 109 MMOL/L (ref 95–110)
CLARITY UR REFRACT.AUTO: CLEAR
CO2 SERPL-SCNC: 25 MMOL/L (ref 23–29)
COLOR UR AUTO: ABNORMAL
CREAT SERPL-MCNC: 1.1 MG/DL (ref 0.5–1.4)
DIFFERENTIAL METHOD: NORMAL
EOSINOPHIL # BLD AUTO: 0.2 K/UL (ref 0–0.5)
EOSINOPHIL NFR BLD: 3 % (ref 0–8)
ERYTHROCYTE [DISTWIDTH] IN BLOOD BY AUTOMATED COUNT: 12.9 % (ref 11.5–14.5)
EST. GFR  (AFRICAN AMERICAN): 56 ML/MIN/1.73 M^2
EST. GFR  (NON AFRICAN AMERICAN): 48.5 ML/MIN/1.73 M^2
GLUCOSE SERPL-MCNC: 93 MG/DL (ref 70–110)
GLUCOSE UR QL STRIP: NEGATIVE
HCT VFR BLD AUTO: 43.2 % (ref 37–48.5)
HGB BLD-MCNC: 14.2 G/DL (ref 12–16)
HGB UR QL STRIP: ABNORMAL
IMM GRANULOCYTES # BLD AUTO: 0.03 K/UL (ref 0–0.04)
IMM GRANULOCYTES NFR BLD AUTO: 0.4 % (ref 0–0.5)
KETONES UR QL STRIP: NEGATIVE
LEUKOCYTE ESTERASE UR QL STRIP: NEGATIVE
LYMPHOCYTES # BLD AUTO: 2 K/UL (ref 1–4.8)
LYMPHOCYTES NFR BLD: 25.3 % (ref 18–48)
MAGNESIUM SERPL-MCNC: 2 MG/DL (ref 1.6–2.6)
MCH RBC QN AUTO: 29.7 PG (ref 27–31)
MCHC RBC AUTO-ENTMCNC: 32.9 G/DL (ref 32–36)
MCV RBC AUTO: 90 FL (ref 82–98)
MICROSCOPIC COMMENT: ABNORMAL
MONOCYTES # BLD AUTO: 1 K/UL (ref 0.3–1)
MONOCYTES NFR BLD: 12.1 % (ref 4–15)
NEUTROPHILS # BLD AUTO: 4.6 K/UL (ref 1.8–7.7)
NEUTROPHILS NFR BLD: 58.3 % (ref 38–73)
NITRITE UR QL STRIP: NEGATIVE
NRBC BLD-RTO: 0 /100 WBC
PH UR STRIP: 5 [PH] (ref 5–8)
PLATELET # BLD AUTO: 203 K/UL (ref 150–450)
PMV BLD AUTO: 10.1 FL (ref 9.2–12.9)
POTASSIUM SERPL-SCNC: 3.6 MMOL/L (ref 3.5–5.1)
PROT UR QL STRIP: NEGATIVE
RBC # BLD AUTO: 4.78 M/UL (ref 4–5.4)
RBC #/AREA URNS AUTO: 34 /HPF (ref 0–4)
SODIUM SERPL-SCNC: 144 MMOL/L (ref 136–145)
SP GR UR STRIP: 1.01 (ref 1–1.03)
URN SPEC COLLECT METH UR: ABNORMAL
WBC # BLD AUTO: 7.93 K/UL (ref 3.9–12.7)
WBC #/AREA URNS AUTO: 0 /HPF (ref 0–5)

## 2021-06-24 PROCEDURE — 83735 ASSAY OF MAGNESIUM: CPT | Performed by: HOSPITALIST

## 2021-06-24 PROCEDURE — 93010 EKG 12-LEAD: ICD-10-PCS | Mod: ,,, | Performed by: INTERNAL MEDICINE

## 2021-06-24 PROCEDURE — 36415 COLL VENOUS BLD VENIPUNCTURE: CPT | Performed by: HOSPITALIST

## 2021-06-24 PROCEDURE — 85025 COMPLETE CBC W/AUTO DIFF WBC: CPT | Performed by: HOSPITALIST

## 2021-06-24 PROCEDURE — 99900035 HC TECH TIME PER 15 MIN (STAT)

## 2021-06-24 PROCEDURE — 25000242 PHARM REV CODE 250 ALT 637 W/ HCPCS: Performed by: PHYSICIAN ASSISTANT

## 2021-06-24 PROCEDURE — 99226 PR SUBSEQUENT OBSERVATION CARE,LEVEL III: ICD-10-PCS | Mod: ,,, | Performed by: PHYSICIAN ASSISTANT

## 2021-06-24 PROCEDURE — 94761 N-INVAS EAR/PLS OXIMETRY MLT: CPT

## 2021-06-24 PROCEDURE — 80048 BASIC METABOLIC PNL TOTAL CA: CPT | Performed by: HOSPITALIST

## 2021-06-24 PROCEDURE — 99226 PR SUBSEQUENT OBSERVATION CARE,LEVEL III: CPT | Mod: ,,, | Performed by: PHYSICIAN ASSISTANT

## 2021-06-24 PROCEDURE — 63600175 PHARM REV CODE 636 W HCPCS: Performed by: HOSPITALIST

## 2021-06-24 PROCEDURE — 81001 URINALYSIS AUTO W/SCOPE: CPT | Performed by: PHYSICIAN ASSISTANT

## 2021-06-24 PROCEDURE — 27000221 HC OXYGEN, UP TO 24 HOURS

## 2021-06-24 PROCEDURE — 94640 AIRWAY INHALATION TREATMENT: CPT

## 2021-06-24 PROCEDURE — 25000003 PHARM REV CODE 250: Performed by: HOSPITALIST

## 2021-06-24 PROCEDURE — 93005 ELECTROCARDIOGRAM TRACING: CPT

## 2021-06-24 PROCEDURE — 96376 TX/PRO/DX INJ SAME DRUG ADON: CPT | Performed by: EMERGENCY MEDICINE

## 2021-06-24 PROCEDURE — G0378 HOSPITAL OBSERVATION PER HR: HCPCS

## 2021-06-24 PROCEDURE — 93010 ELECTROCARDIOGRAM REPORT: CPT | Mod: ,,, | Performed by: INTERNAL MEDICINE

## 2021-06-24 RX ORDER — IPRATROPIUM BROMIDE AND ALBUTEROL SULFATE 2.5; .5 MG/3ML; MG/3ML
3 SOLUTION RESPIRATORY (INHALATION) EVERY 4 HOURS
Status: COMPLETED | OUTPATIENT
Start: 2021-06-24 | End: 2021-06-24

## 2021-06-24 RX ADMIN — FUROSEMIDE 60 MG: 10 INJECTION INTRAMUSCULAR; INTRAVENOUS at 08:06

## 2021-06-24 RX ADMIN — ASPIRIN 81 MG: 81 TABLET, COATED ORAL at 08:06

## 2021-06-24 RX ADMIN — METOPROLOL SUCCINATE 100 MG: 100 TABLET, EXTENDED RELEASE ORAL at 08:06

## 2021-06-24 RX ADMIN — IPRATROPIUM BROMIDE AND ALBUTEROL SULFATE 3 ML: .5; 2.5 SOLUTION RESPIRATORY (INHALATION) at 08:06

## 2021-06-24 RX ADMIN — FUROSEMIDE 60 MG: 10 INJECTION INTRAMUSCULAR; INTRAVENOUS at 06:06

## 2021-06-24 RX ADMIN — RIVAROXABAN 15 MG: 15 TABLET, FILM COATED ORAL at 05:06

## 2021-06-24 RX ADMIN — ATORVASTATIN CALCIUM 20 MG: 20 TABLET, FILM COATED ORAL at 08:06

## 2021-06-24 RX ADMIN — IPRATROPIUM BROMIDE AND ALBUTEROL SULFATE 3 ML: .5; 2.5 SOLUTION RESPIRATORY (INHALATION) at 04:06

## 2021-06-25 PROBLEM — J90 PLEURAL EFFUSION: Status: ACTIVE | Noted: 2021-06-25

## 2021-06-25 LAB
ALBUMIN SERPL BCP-MCNC: 2.9 G/DL (ref 3.5–5.2)
ALP SERPL-CCNC: 80 U/L (ref 55–135)
ALT SERPL W/O P-5'-P-CCNC: 17 U/L (ref 10–44)
ANION GAP SERPL CALC-SCNC: 11 MMOL/L (ref 8–16)
ANION GAP SERPL CALC-SCNC: 13 MMOL/L (ref 8–16)
ANION GAP SERPL CALC-SCNC: 13 MMOL/L (ref 8–16)
AST SERPL-CCNC: 21 U/L (ref 10–40)
BACTERIA #/AREA URNS AUTO: ABNORMAL /HPF
BASOPHILS # BLD AUTO: 0.04 K/UL (ref 0–0.2)
BASOPHILS # BLD AUTO: 0.06 K/UL (ref 0–0.2)
BASOPHILS NFR BLD: 0.6 % (ref 0–1.9)
BASOPHILS NFR BLD: 0.8 % (ref 0–1.9)
BILIRUB SERPL-MCNC: 0.7 MG/DL (ref 0.1–1)
BILIRUB UR QL STRIP: NEGATIVE
BNP SERPL-MCNC: 178 PG/ML (ref 0–99)
BUN SERPL-MCNC: 22 MG/DL (ref 8–23)
BUN SERPL-MCNC: 25 MG/DL (ref 8–23)
BUN SERPL-MCNC: 25 MG/DL (ref 8–23)
CALCIUM SERPL-MCNC: 10 MG/DL (ref 8.7–10.5)
CALCIUM SERPL-MCNC: 10.1 MG/DL (ref 8.7–10.5)
CALCIUM SERPL-MCNC: 9.9 MG/DL (ref 8.7–10.5)
CHLORIDE SERPL-SCNC: 105 MMOL/L (ref 95–110)
CHLORIDE SERPL-SCNC: 106 MMOL/L (ref 95–110)
CHLORIDE SERPL-SCNC: 106 MMOL/L (ref 95–110)
CLARITY UR REFRACT.AUTO: ABNORMAL
CO2 SERPL-SCNC: 21 MMOL/L (ref 23–29)
CO2 SERPL-SCNC: 22 MMOL/L (ref 23–29)
CO2 SERPL-SCNC: 27 MMOL/L (ref 23–29)
COLOR UR AUTO: YELLOW
CREAT SERPL-MCNC: 1.2 MG/DL (ref 0.5–1.4)
CREAT SERPL-MCNC: 1.4 MG/DL (ref 0.5–1.4)
CREAT SERPL-MCNC: 1.6 MG/DL (ref 0.5–1.4)
DIFFERENTIAL METHOD: NORMAL
DIFFERENTIAL METHOD: NORMAL
EOSINOPHIL # BLD AUTO: 0.1 K/UL (ref 0–0.5)
EOSINOPHIL # BLD AUTO: 0.2 K/UL (ref 0–0.5)
EOSINOPHIL NFR BLD: 1.8 % (ref 0–8)
EOSINOPHIL NFR BLD: 3 % (ref 0–8)
ERYTHROCYTE [DISTWIDTH] IN BLOOD BY AUTOMATED COUNT: 13 % (ref 11.5–14.5)
ERYTHROCYTE [DISTWIDTH] IN BLOOD BY AUTOMATED COUNT: 13.1 % (ref 11.5–14.5)
EST. GFR  (AFRICAN AMERICAN): 35.6 ML/MIN/1.73 M^2
EST. GFR  (AFRICAN AMERICAN): 41.8 ML/MIN/1.73 M^2
EST. GFR  (AFRICAN AMERICAN): 50.4 ML/MIN/1.73 M^2
EST. GFR  (NON AFRICAN AMERICAN): 30.9 ML/MIN/1.73 M^2
EST. GFR  (NON AFRICAN AMERICAN): 36.3 ML/MIN/1.73 M^2
EST. GFR  (NON AFRICAN AMERICAN): 43.7 ML/MIN/1.73 M^2
GLUCOSE SERPL-MCNC: 121 MG/DL (ref 70–110)
GLUCOSE SERPL-MCNC: 167 MG/DL (ref 70–110)
GLUCOSE SERPL-MCNC: 87 MG/DL (ref 70–110)
GLUCOSE UR QL STRIP: NEGATIVE
HCT VFR BLD AUTO: 39.1 % (ref 37–48.5)
HCT VFR BLD AUTO: 42.5 % (ref 37–48.5)
HGB BLD-MCNC: 12.8 G/DL (ref 12–16)
HGB BLD-MCNC: 14 G/DL (ref 12–16)
HGB UR QL STRIP: ABNORMAL
HYALINE CASTS UR QL AUTO: 5 /LPF
IMM GRANULOCYTES # BLD AUTO: 0.01 K/UL (ref 0–0.04)
IMM GRANULOCYTES # BLD AUTO: 0.03 K/UL (ref 0–0.04)
IMM GRANULOCYTES NFR BLD AUTO: 0.1 % (ref 0–0.5)
IMM GRANULOCYTES NFR BLD AUTO: 0.4 % (ref 0–0.5)
KETONES UR QL STRIP: NEGATIVE
LACTATE SERPL-SCNC: 2.1 MMOL/L (ref 0.5–2.2)
LACTATE SERPL-SCNC: 4.6 MMOL/L (ref 0.5–2.2)
LEUKOCYTE ESTERASE UR QL STRIP: ABNORMAL
LYMPHOCYTES # BLD AUTO: 1.3 K/UL (ref 1–4.8)
LYMPHOCYTES # BLD AUTO: 2.1 K/UL (ref 1–4.8)
LYMPHOCYTES NFR BLD: 19.4 % (ref 18–48)
LYMPHOCYTES NFR BLD: 28.1 % (ref 18–48)
MAGNESIUM SERPL-MCNC: 1.9 MG/DL (ref 1.6–2.6)
MAGNESIUM SERPL-MCNC: 1.9 MG/DL (ref 1.6–2.6)
MCH RBC QN AUTO: 30 PG (ref 27–31)
MCH RBC QN AUTO: 30 PG (ref 27–31)
MCHC RBC AUTO-ENTMCNC: 32.7 G/DL (ref 32–36)
MCHC RBC AUTO-ENTMCNC: 32.9 G/DL (ref 32–36)
MCV RBC AUTO: 91 FL (ref 82–98)
MCV RBC AUTO: 92 FL (ref 82–98)
MICROSCOPIC COMMENT: ABNORMAL
MONOCYTES # BLD AUTO: 0.9 K/UL (ref 0.3–1)
MONOCYTES # BLD AUTO: 0.9 K/UL (ref 0.3–1)
MONOCYTES NFR BLD: 11.7 % (ref 4–15)
MONOCYTES NFR BLD: 12.6 % (ref 4–15)
NEUTROPHILS # BLD AUTO: 4.1 K/UL (ref 1.8–7.7)
NEUTROPHILS # BLD AUTO: 4.4 K/UL (ref 1.8–7.7)
NEUTROPHILS NFR BLD: 56 % (ref 38–73)
NEUTROPHILS NFR BLD: 65.5 % (ref 38–73)
NITRITE UR QL STRIP: NEGATIVE
NRBC BLD-RTO: 0 /100 WBC
NRBC BLD-RTO: 0 /100 WBC
PH UR STRIP: 6 [PH] (ref 5–8)
PLATELET # BLD AUTO: 174 K/UL (ref 150–450)
PLATELET # BLD AUTO: 196 K/UL (ref 150–450)
PMV BLD AUTO: 10.2 FL (ref 9.2–12.9)
PMV BLD AUTO: 10.3 FL (ref 9.2–12.9)
POTASSIUM SERPL-SCNC: 3.4 MMOL/L (ref 3.5–5.1)
POTASSIUM SERPL-SCNC: 3.7 MMOL/L (ref 3.5–5.1)
POTASSIUM SERPL-SCNC: 4.2 MMOL/L (ref 3.5–5.1)
PROT SERPL-MCNC: 5.7 G/DL (ref 6–8.4)
PROT UR QL STRIP: NEGATIVE
RBC # BLD AUTO: 4.27 M/UL (ref 4–5.4)
RBC # BLD AUTO: 4.66 M/UL (ref 4–5.4)
RBC #/AREA URNS AUTO: >100 /HPF (ref 0–4)
SODIUM SERPL-SCNC: 140 MMOL/L (ref 136–145)
SODIUM SERPL-SCNC: 141 MMOL/L (ref 136–145)
SODIUM SERPL-SCNC: 143 MMOL/L (ref 136–145)
SP GR UR STRIP: 1 (ref 1–1.03)
SQUAMOUS #/AREA URNS AUTO: 8 /HPF
TROPONIN I SERPL DL<=0.01 NG/ML-MCNC: 0.01 NG/ML (ref 0–0.03)
URN SPEC COLLECT METH UR: ABNORMAL
WBC # BLD AUTO: 6.77 K/UL (ref 3.9–12.7)
WBC # BLD AUTO: 7.36 K/UL (ref 3.9–12.7)
WBC #/AREA URNS AUTO: 6 /HPF (ref 0–5)

## 2021-06-25 PROCEDURE — 25000003 PHARM REV CODE 250: Performed by: PHYSICIAN ASSISTANT

## 2021-06-25 PROCEDURE — 93010 EKG 12-LEAD: ICD-10-PCS | Mod: ,,, | Performed by: INTERNAL MEDICINE

## 2021-06-25 PROCEDURE — 96361 HYDRATE IV INFUSION ADD-ON: CPT | Performed by: EMERGENCY MEDICINE

## 2021-06-25 PROCEDURE — 84484 ASSAY OF TROPONIN QUANT: CPT | Performed by: PHYSICIAN ASSISTANT

## 2021-06-25 PROCEDURE — 85025 COMPLETE CBC W/AUTO DIFF WBC: CPT | Performed by: HOSPITALIST

## 2021-06-25 PROCEDURE — 99233 SBSQ HOSP IP/OBS HIGH 50: CPT | Mod: ,,, | Performed by: PHYSICIAN ASSISTANT

## 2021-06-25 PROCEDURE — 85025 COMPLETE CBC W/AUTO DIFF WBC: CPT | Mod: 91 | Performed by: PHYSICIAN ASSISTANT

## 2021-06-25 PROCEDURE — 11000001 HC ACUTE MED/SURG PRIVATE ROOM

## 2021-06-25 PROCEDURE — 80048 BASIC METABOLIC PNL TOTAL CA: CPT | Mod: 91 | Performed by: PHYSICIAN ASSISTANT

## 2021-06-25 PROCEDURE — 93005 ELECTROCARDIOGRAM TRACING: CPT

## 2021-06-25 PROCEDURE — 27000221 HC OXYGEN, UP TO 24 HOURS

## 2021-06-25 PROCEDURE — 93010 ELECTROCARDIOGRAM REPORT: CPT | Mod: ,,, | Performed by: INTERNAL MEDICINE

## 2021-06-25 PROCEDURE — 83735 ASSAY OF MAGNESIUM: CPT | Performed by: HOSPITALIST

## 2021-06-25 PROCEDURE — 83735 ASSAY OF MAGNESIUM: CPT | Mod: 91 | Performed by: PHYSICIAN ASSISTANT

## 2021-06-25 PROCEDURE — 81001 URINALYSIS AUTO W/SCOPE: CPT | Performed by: PHYSICIAN ASSISTANT

## 2021-06-25 PROCEDURE — 80048 BASIC METABOLIC PNL TOTAL CA: CPT | Performed by: HOSPITALIST

## 2021-06-25 PROCEDURE — 94799 UNLISTED PULMONARY SVC/PX: CPT

## 2021-06-25 PROCEDURE — 96375 TX/PRO/DX INJ NEW DRUG ADDON: CPT | Performed by: EMERGENCY MEDICINE

## 2021-06-25 PROCEDURE — 80053 COMPREHEN METABOLIC PANEL: CPT | Performed by: PHYSICIAN ASSISTANT

## 2021-06-25 PROCEDURE — 99233 PR SUBSEQUENT HOSPITAL CARE,LEVL III: ICD-10-PCS | Mod: ,,, | Performed by: PHYSICIAN ASSISTANT

## 2021-06-25 PROCEDURE — 96376 TX/PRO/DX INJ SAME DRUG ADON: CPT | Performed by: EMERGENCY MEDICINE

## 2021-06-25 PROCEDURE — 36415 COLL VENOUS BLD VENIPUNCTURE: CPT | Performed by: HOSPITALIST

## 2021-06-25 PROCEDURE — 36415 COLL VENOUS BLD VENIPUNCTURE: CPT | Performed by: PHYSICIAN ASSISTANT

## 2021-06-25 PROCEDURE — 25000242 PHARM REV CODE 250 ALT 637 W/ HCPCS: Performed by: PHYSICIAN ASSISTANT

## 2021-06-25 PROCEDURE — 25000003 PHARM REV CODE 250: Performed by: HOSPITALIST

## 2021-06-25 PROCEDURE — 87040 BLOOD CULTURE FOR BACTERIA: CPT | Mod: 59 | Performed by: PHYSICIAN ASSISTANT

## 2021-06-25 PROCEDURE — 83880 ASSAY OF NATRIURETIC PEPTIDE: CPT | Performed by: PHYSICIAN ASSISTANT

## 2021-06-25 PROCEDURE — 83605 ASSAY OF LACTIC ACID: CPT | Mod: 91 | Performed by: PHYSICIAN ASSISTANT

## 2021-06-25 PROCEDURE — 94640 AIRWAY INHALATION TREATMENT: CPT

## 2021-06-25 PROCEDURE — 94761 N-INVAS EAR/PLS OXIMETRY MLT: CPT

## 2021-06-25 PROCEDURE — 63600175 PHARM REV CODE 636 W HCPCS: Performed by: PHYSICIAN ASSISTANT

## 2021-06-25 RX ORDER — METOPROLOL TARTRATE 1 MG/ML
5 INJECTION, SOLUTION INTRAVENOUS EVERY 5 MIN PRN
Status: DISCONTINUED | OUTPATIENT
Start: 2021-06-25 | End: 2021-06-29 | Stop reason: HOSPADM

## 2021-06-25 RX ORDER — METOPROLOL TARTRATE 1 MG/ML
5 INJECTION, SOLUTION INTRAVENOUS EVERY 5 MIN PRN
Status: DISCONTINUED | OUTPATIENT
Start: 2021-06-25 | End: 2021-06-25

## 2021-06-25 RX ORDER — FUROSEMIDE 10 MG/ML
80 INJECTION INTRAMUSCULAR; INTRAVENOUS 2 TIMES DAILY
Status: DISCONTINUED | OUTPATIENT
Start: 2021-06-25 | End: 2021-06-25

## 2021-06-25 RX ORDER — SODIUM CHLORIDE 9 MG/ML
INJECTION, SOLUTION INTRAVENOUS ONCE
Status: COMPLETED | OUTPATIENT
Start: 2021-06-25 | End: 2021-06-25

## 2021-06-25 RX ORDER — POTASSIUM CHLORIDE 20 MEQ/1
40 TABLET, EXTENDED RELEASE ORAL EVERY 4 HOURS
Status: COMPLETED | OUTPATIENT
Start: 2021-06-25 | End: 2021-06-25

## 2021-06-25 RX ORDER — IPRATROPIUM BROMIDE AND ALBUTEROL SULFATE 2.5; .5 MG/3ML; MG/3ML
3 SOLUTION RESPIRATORY (INHALATION) EVERY 4 HOURS
Status: COMPLETED | OUTPATIENT
Start: 2021-06-25 | End: 2021-06-25

## 2021-06-25 RX ORDER — IPRATROPIUM BROMIDE AND ALBUTEROL SULFATE 2.5; .5 MG/3ML; MG/3ML
SOLUTION RESPIRATORY (INHALATION)
Status: DISPENSED
Start: 2021-06-25 | End: 2021-06-25

## 2021-06-25 RX ORDER — FUROSEMIDE 10 MG/ML
60 INJECTION INTRAMUSCULAR; INTRAVENOUS 2 TIMES DAILY
Status: DISCONTINUED | OUTPATIENT
Start: 2021-06-25 | End: 2021-06-25

## 2021-06-25 RX ADMIN — IPRATROPIUM BROMIDE AND ALBUTEROL SULFATE 3 ML: .5; 2.5 SOLUTION RESPIRATORY (INHALATION) at 11:06

## 2021-06-25 RX ADMIN — LOSARTAN POTASSIUM 50 MG: 50 TABLET, FILM COATED ORAL at 09:06

## 2021-06-25 RX ADMIN — POTASSIUM CHLORIDE 40 MEQ: 1500 TABLET, EXTENDED RELEASE ORAL at 09:06

## 2021-06-25 RX ADMIN — ATORVASTATIN CALCIUM 20 MG: 20 TABLET, FILM COATED ORAL at 09:06

## 2021-06-25 RX ADMIN — POTASSIUM CHLORIDE 40 MEQ: 1500 TABLET, EXTENDED RELEASE ORAL at 02:06

## 2021-06-25 RX ADMIN — SODIUM CHLORIDE 250 ML: 0.9 INJECTION, SOLUTION INTRAVENOUS at 01:06

## 2021-06-25 RX ADMIN — IPRATROPIUM BROMIDE AND ALBUTEROL SULFATE 3 ML: .5; 2.5 SOLUTION RESPIRATORY (INHALATION) at 09:06

## 2021-06-25 RX ADMIN — RIVAROXABAN 15 MG: 15 TABLET, FILM COATED ORAL at 05:06

## 2021-06-25 RX ADMIN — METOROPROLOL TARTRATE 5 MG: 5 INJECTION, SOLUTION INTRAVENOUS at 01:06

## 2021-06-25 RX ADMIN — ASPIRIN 81 MG: 81 TABLET, COATED ORAL at 09:06

## 2021-06-25 RX ADMIN — SODIUM CHLORIDE 250 ML: 0.9 INJECTION, SOLUTION INTRAVENOUS at 05:06

## 2021-06-25 RX ADMIN — FUROSEMIDE 60 MG: 10 INJECTION INTRAMUSCULAR; INTRAVENOUS at 09:06

## 2021-06-25 RX ADMIN — AMLODIPINE BESYLATE 5 MG: 5 TABLET ORAL at 09:06

## 2021-06-25 RX ADMIN — SODIUM CHLORIDE 250 ML/HR: 0.9 INJECTION, SOLUTION INTRAVENOUS at 02:06

## 2021-06-25 RX ADMIN — METOPROLOL SUCCINATE 100 MG: 100 TABLET, EXTENDED RELEASE ORAL at 09:06

## 2021-06-26 PROBLEM — J96.01 ACUTE HYPOXEMIC RESPIRATORY FAILURE: Status: ACTIVE | Noted: 2021-06-26

## 2021-06-26 LAB
ANION GAP SERPL CALC-SCNC: 12 MMOL/L (ref 8–16)
BASOPHILS # BLD AUTO: 0.07 K/UL (ref 0–0.2)
BASOPHILS NFR BLD: 1 % (ref 0–1.9)
BUN SERPL-MCNC: 23 MG/DL (ref 8–23)
CALCIUM SERPL-MCNC: 9.7 MG/DL (ref 8.7–10.5)
CHLORIDE SERPL-SCNC: 111 MMOL/L (ref 95–110)
CO2 SERPL-SCNC: 20 MMOL/L (ref 23–29)
CREAT SERPL-MCNC: 1.2 MG/DL (ref 0.5–1.4)
DIFFERENTIAL METHOD: ABNORMAL
EOSINOPHIL # BLD AUTO: 0.3 K/UL (ref 0–0.5)
EOSINOPHIL NFR BLD: 3.9 % (ref 0–8)
ERYTHROCYTE [DISTWIDTH] IN BLOOD BY AUTOMATED COUNT: 13.1 % (ref 11.5–14.5)
EST. GFR  (AFRICAN AMERICAN): 50.4 ML/MIN/1.73 M^2
EST. GFR  (NON AFRICAN AMERICAN): 43.7 ML/MIN/1.73 M^2
GLUCOSE SERPL-MCNC: 77 MG/DL (ref 70–110)
HCT VFR BLD AUTO: 39.8 % (ref 37–48.5)
HGB BLD-MCNC: 12.6 G/DL (ref 12–16)
IMM GRANULOCYTES # BLD AUTO: 0.01 K/UL (ref 0–0.04)
IMM GRANULOCYTES NFR BLD AUTO: 0.1 % (ref 0–0.5)
LYMPHOCYTES # BLD AUTO: 2 K/UL (ref 1–4.8)
LYMPHOCYTES NFR BLD: 28.1 % (ref 18–48)
MAGNESIUM SERPL-MCNC: 1.9 MG/DL (ref 1.6–2.6)
MCH RBC QN AUTO: 29.7 PG (ref 27–31)
MCHC RBC AUTO-ENTMCNC: 31.7 G/DL (ref 32–36)
MCV RBC AUTO: 94 FL (ref 82–98)
MONOCYTES # BLD AUTO: 0.9 K/UL (ref 0.3–1)
MONOCYTES NFR BLD: 13 % (ref 4–15)
NEUTROPHILS # BLD AUTO: 3.9 K/UL (ref 1.8–7.7)
NEUTROPHILS NFR BLD: 53.9 % (ref 38–73)
NRBC BLD-RTO: 0 /100 WBC
PLATELET # BLD AUTO: 178 K/UL (ref 150–450)
PMV BLD AUTO: 10 FL (ref 9.2–12.9)
POTASSIUM SERPL-SCNC: 4.2 MMOL/L (ref 3.5–5.1)
RBC # BLD AUTO: 4.24 M/UL (ref 4–5.4)
SODIUM SERPL-SCNC: 143 MMOL/L (ref 136–145)
T4 FREE SERPL-MCNC: 0.9 NG/DL (ref 0.71–1.51)
TSH SERPL DL<=0.005 MIU/L-ACNC: 4.37 UIU/ML (ref 0.4–4)
WBC # BLD AUTO: 7.16 K/UL (ref 3.9–12.7)

## 2021-06-26 PROCEDURE — 63600175 PHARM REV CODE 636 W HCPCS: Performed by: PHYSICIAN ASSISTANT

## 2021-06-26 PROCEDURE — 80048 BASIC METABOLIC PNL TOTAL CA: CPT | Performed by: HOSPITALIST

## 2021-06-26 PROCEDURE — 83735 ASSAY OF MAGNESIUM: CPT | Performed by: HOSPITALIST

## 2021-06-26 PROCEDURE — 99233 SBSQ HOSP IP/OBS HIGH 50: CPT | Mod: ,,, | Performed by: PHYSICIAN ASSISTANT

## 2021-06-26 PROCEDURE — 84439 ASSAY OF FREE THYROXINE: CPT | Performed by: PHYSICIAN ASSISTANT

## 2021-06-26 PROCEDURE — 94799 UNLISTED PULMONARY SVC/PX: CPT

## 2021-06-26 PROCEDURE — 25000242 PHARM REV CODE 250 ALT 637 W/ HCPCS: Performed by: PHYSICIAN ASSISTANT

## 2021-06-26 PROCEDURE — 99233 PR SUBSEQUENT HOSPITAL CARE,LEVL III: ICD-10-PCS | Mod: ,,, | Performed by: PHYSICIAN ASSISTANT

## 2021-06-26 PROCEDURE — 99900035 HC TECH TIME PER 15 MIN (STAT)

## 2021-06-26 PROCEDURE — 84443 ASSAY THYROID STIM HORMONE: CPT | Performed by: PHYSICIAN ASSISTANT

## 2021-06-26 PROCEDURE — 85025 COMPLETE CBC W/AUTO DIFF WBC: CPT | Performed by: HOSPITALIST

## 2021-06-26 PROCEDURE — 25000003 PHARM REV CODE 250: Performed by: HOSPITALIST

## 2021-06-26 PROCEDURE — 36415 COLL VENOUS BLD VENIPUNCTURE: CPT | Performed by: HOSPITALIST

## 2021-06-26 PROCEDURE — 11000001 HC ACUTE MED/SURG PRIVATE ROOM

## 2021-06-26 PROCEDURE — 94640 AIRWAY INHALATION TREATMENT: CPT

## 2021-06-26 PROCEDURE — 27000221 HC OXYGEN, UP TO 24 HOURS

## 2021-06-26 PROCEDURE — 94761 N-INVAS EAR/PLS OXIMETRY MLT: CPT

## 2021-06-26 RX ORDER — PREDNISONE 20 MG/1
60 TABLET ORAL DAILY
Status: DISCONTINUED | OUTPATIENT
Start: 2021-06-27 | End: 2021-06-29 | Stop reason: HOSPADM

## 2021-06-26 RX ORDER — LEVALBUTEROL INHALATION SOLUTION 0.63 MG/3ML
0.63 SOLUTION RESPIRATORY (INHALATION) EVERY 8 HOURS
Status: DISCONTINUED | OUTPATIENT
Start: 2021-06-26 | End: 2021-06-29 | Stop reason: HOSPADM

## 2021-06-26 RX ORDER — METHYLPREDNISOLONE SOD SUCC 125 MG
125 VIAL (EA) INJECTION ONCE
Status: COMPLETED | OUTPATIENT
Start: 2021-06-26 | End: 2021-06-26

## 2021-06-26 RX ORDER — IPRATROPIUM BROMIDE AND ALBUTEROL SULFATE 2.5; .5 MG/3ML; MG/3ML
3 SOLUTION RESPIRATORY (INHALATION) EVERY 4 HOURS
Status: DISCONTINUED | OUTPATIENT
Start: 2021-06-26 | End: 2021-06-26

## 2021-06-26 RX ADMIN — ATORVASTATIN CALCIUM 20 MG: 20 TABLET, FILM COATED ORAL at 08:06

## 2021-06-26 RX ADMIN — METHYLPREDNISOLONE SODIUM SUCCINATE 125 MG: 125 INJECTION, POWDER, FOR SOLUTION INTRAMUSCULAR; INTRAVENOUS at 05:06

## 2021-06-26 RX ADMIN — METOPROLOL SUCCINATE 100 MG: 100 TABLET, EXTENDED RELEASE ORAL at 08:06

## 2021-06-26 RX ADMIN — LEVALBUTEROL HYDROCHLORIDE 0.63 MG: 0.63 SOLUTION RESPIRATORY (INHALATION) at 11:06

## 2021-06-26 RX ADMIN — RIVAROXABAN 15 MG: 15 TABLET, FILM COATED ORAL at 05:06

## 2021-06-26 RX ADMIN — ASPIRIN 81 MG: 81 TABLET, COATED ORAL at 08:06

## 2021-06-26 RX ADMIN — LEVALBUTEROL HYDROCHLORIDE 0.63 MG: 0.63 SOLUTION RESPIRATORY (INHALATION) at 03:06

## 2021-06-27 LAB
ALLENS TEST: ABNORMAL
ANION GAP SERPL CALC-SCNC: 10 MMOL/L (ref 8–16)
BASOPHILS # BLD AUTO: 0.01 K/UL (ref 0–0.2)
BASOPHILS NFR BLD: 0.1 % (ref 0–1.9)
BUN SERPL-MCNC: 29 MG/DL (ref 8–23)
CALCIUM SERPL-MCNC: 10.3 MG/DL (ref 8.7–10.5)
CHLORIDE SERPL-SCNC: 107 MMOL/L (ref 95–110)
CO2 SERPL-SCNC: 22 MMOL/L (ref 23–29)
CREAT SERPL-MCNC: 1.1 MG/DL (ref 0.5–1.4)
DELSYS: ABNORMAL
DIFFERENTIAL METHOD: ABNORMAL
EOSINOPHIL # BLD AUTO: 0 K/UL (ref 0–0.5)
EOSINOPHIL NFR BLD: 0 % (ref 0–8)
ERYTHROCYTE [DISTWIDTH] IN BLOOD BY AUTOMATED COUNT: 12.4 % (ref 11.5–14.5)
EST. GFR  (AFRICAN AMERICAN): 56 ML/MIN/1.73 M^2
EST. GFR  (NON AFRICAN AMERICAN): 48.5 ML/MIN/1.73 M^2
FIO2: 27
FLOW: 2
GLUCOSE SERPL-MCNC: 146 MG/DL (ref 70–110)
HCO3 UR-SCNC: 22.9 MMOL/L (ref 24–28)
HCT VFR BLD AUTO: 41.5 % (ref 37–48.5)
HGB BLD-MCNC: 13.6 G/DL (ref 12–16)
IMM GRANULOCYTES # BLD AUTO: 0.04 K/UL (ref 0–0.04)
IMM GRANULOCYTES NFR BLD AUTO: 0.6 % (ref 0–0.5)
LYMPHOCYTES # BLD AUTO: 1.1 K/UL (ref 1–4.8)
LYMPHOCYTES NFR BLD: 15.3 % (ref 18–48)
MAGNESIUM SERPL-MCNC: 2 MG/DL (ref 1.6–2.6)
MCH RBC QN AUTO: 30 PG (ref 27–31)
MCHC RBC AUTO-ENTMCNC: 32.8 G/DL (ref 32–36)
MCV RBC AUTO: 92 FL (ref 82–98)
MODE: ABNORMAL
MONOCYTES # BLD AUTO: 0.1 K/UL (ref 0.3–1)
MONOCYTES NFR BLD: 0.9 % (ref 4–15)
NEUTROPHILS # BLD AUTO: 5.9 K/UL (ref 1.8–7.7)
NEUTROPHILS NFR BLD: 83.1 % (ref 38–73)
NRBC BLD-RTO: 0 /100 WBC
PCO2 BLDA: 33.5 MMHG (ref 35–45)
PH SMN: 7.44 [PH] (ref 7.35–7.45)
PLATELET # BLD AUTO: 180 K/UL (ref 150–450)
PMV BLD AUTO: 10.1 FL (ref 9.2–12.9)
PO2 BLDA: 80 MMHG (ref 80–100)
POC BE: -1 MMOL/L
POC SATURATED O2: 96 % (ref 95–100)
POC TCO2: 24 MMOL/L (ref 23–27)
POTASSIUM SERPL-SCNC: 4.4 MMOL/L (ref 3.5–5.1)
RBC # BLD AUTO: 4.53 M/UL (ref 4–5.4)
SAMPLE: ABNORMAL
SITE: ABNORMAL
SODIUM SERPL-SCNC: 139 MMOL/L (ref 136–145)
SP02: 98
WBC # BLD AUTO: 7.05 K/UL (ref 3.9–12.7)

## 2021-06-27 PROCEDURE — 94799 UNLISTED PULMONARY SVC/PX: CPT

## 2021-06-27 PROCEDURE — 99233 PR SUBSEQUENT HOSPITAL CARE,LEVL III: ICD-10-PCS | Mod: ,,, | Performed by: PHYSICIAN ASSISTANT

## 2021-06-27 PROCEDURE — 25000003 PHARM REV CODE 250: Performed by: HOSPITALIST

## 2021-06-27 PROCEDURE — 85025 COMPLETE CBC W/AUTO DIFF WBC: CPT | Performed by: HOSPITALIST

## 2021-06-27 PROCEDURE — 27000221 HC OXYGEN, UP TO 24 HOURS

## 2021-06-27 PROCEDURE — 99900035 HC TECH TIME PER 15 MIN (STAT)

## 2021-06-27 PROCEDURE — 80048 BASIC METABOLIC PNL TOTAL CA: CPT | Performed by: HOSPITALIST

## 2021-06-27 PROCEDURE — 99233 SBSQ HOSP IP/OBS HIGH 50: CPT | Mod: ,,, | Performed by: PHYSICIAN ASSISTANT

## 2021-06-27 PROCEDURE — 83735 ASSAY OF MAGNESIUM: CPT | Performed by: HOSPITALIST

## 2021-06-27 PROCEDURE — 99356 PR PROLONGED SERV,INPATIENT,1ST HR: CPT | Mod: ,,, | Performed by: PHYSICIAN ASSISTANT

## 2021-06-27 PROCEDURE — 36415 COLL VENOUS BLD VENIPUNCTURE: CPT | Performed by: HOSPITALIST

## 2021-06-27 PROCEDURE — 82803 BLOOD GASES ANY COMBINATION: CPT

## 2021-06-27 PROCEDURE — 11000001 HC ACUTE MED/SURG PRIVATE ROOM

## 2021-06-27 PROCEDURE — 94640 AIRWAY INHALATION TREATMENT: CPT

## 2021-06-27 PROCEDURE — 36600 WITHDRAWAL OF ARTERIAL BLOOD: CPT

## 2021-06-27 PROCEDURE — 94761 N-INVAS EAR/PLS OXIMETRY MLT: CPT

## 2021-06-27 PROCEDURE — 25000242 PHARM REV CODE 250 ALT 637 W/ HCPCS: Performed by: PHYSICIAN ASSISTANT

## 2021-06-27 PROCEDURE — 63600175 PHARM REV CODE 636 W HCPCS: Performed by: PHYSICIAN ASSISTANT

## 2021-06-27 PROCEDURE — 99356 PR PROLONGED SERV,INPATIENT,1ST HR: ICD-10-PCS | Mod: ,,, | Performed by: PHYSICIAN ASSISTANT

## 2021-06-27 RX ADMIN — RIVAROXABAN 15 MG: 15 TABLET, FILM COATED ORAL at 05:06

## 2021-06-27 RX ADMIN — PREDNISONE 60 MG: 20 TABLET ORAL at 08:06

## 2021-06-27 RX ADMIN — LEVALBUTEROL HYDROCHLORIDE 0.63 MG: 0.63 SOLUTION RESPIRATORY (INHALATION) at 04:06

## 2021-06-27 RX ADMIN — LEVALBUTEROL HYDROCHLORIDE 0.63 MG: 0.63 SOLUTION RESPIRATORY (INHALATION) at 08:06

## 2021-06-27 RX ADMIN — LEVALBUTEROL HYDROCHLORIDE 0.63 MG: 0.63 SOLUTION RESPIRATORY (INHALATION) at 11:06

## 2021-06-27 RX ADMIN — ASPIRIN 81 MG: 81 TABLET, COATED ORAL at 08:06

## 2021-06-27 RX ADMIN — METOPROLOL SUCCINATE 100 MG: 100 TABLET, EXTENDED RELEASE ORAL at 08:06

## 2021-06-27 RX ADMIN — ATORVASTATIN CALCIUM 20 MG: 20 TABLET, FILM COATED ORAL at 08:06

## 2021-06-28 ENCOUNTER — TELEPHONE (OUTPATIENT)
Dept: PULMONOLOGY | Facility: CLINIC | Age: 76
End: 2021-06-28

## 2021-06-28 VITALS
BODY MASS INDEX: 30.28 KG/M2 | TEMPERATURE: 98 F | DIASTOLIC BLOOD PRESSURE: 61 MMHG | WEIGHT: 170.88 LBS | HEART RATE: 67 BPM | HEIGHT: 63 IN | RESPIRATION RATE: 18 BRPM | OXYGEN SATURATION: 92 % | SYSTOLIC BLOOD PRESSURE: 136 MMHG

## 2021-06-28 PROBLEM — J98.11 ATELECTASIS: Status: ACTIVE | Noted: 2021-06-28

## 2021-06-28 LAB
ANION GAP SERPL CALC-SCNC: 13 MMOL/L (ref 8–16)
BASOPHILS # BLD AUTO: 0.02 K/UL (ref 0–0.2)
BASOPHILS NFR BLD: 0.1 % (ref 0–1.9)
BUN SERPL-MCNC: 39 MG/DL (ref 8–23)
CALCIUM SERPL-MCNC: 10.1 MG/DL (ref 8.7–10.5)
CHLORIDE SERPL-SCNC: 107 MMOL/L (ref 95–110)
CO2 SERPL-SCNC: 20 MMOL/L (ref 23–29)
CREAT SERPL-MCNC: 1.3 MG/DL (ref 0.5–1.4)
DIFFERENTIAL METHOD: ABNORMAL
EOSINOPHIL # BLD AUTO: 0 K/UL (ref 0–0.5)
EOSINOPHIL NFR BLD: 0 % (ref 0–8)
ERYTHROCYTE [DISTWIDTH] IN BLOOD BY AUTOMATED COUNT: 12.7 % (ref 11.5–14.5)
EST. GFR  (AFRICAN AMERICAN): 45.7 ML/MIN/1.73 M^2
EST. GFR  (NON AFRICAN AMERICAN): 39.7 ML/MIN/1.73 M^2
GLUCOSE SERPL-MCNC: 115 MG/DL (ref 70–110)
HCT VFR BLD AUTO: 39.2 % (ref 37–48.5)
HGB BLD-MCNC: 12.8 G/DL (ref 12–16)
IMM GRANULOCYTES # BLD AUTO: 0.1 K/UL (ref 0–0.04)
IMM GRANULOCYTES NFR BLD AUTO: 0.6 % (ref 0–0.5)
LYMPHOCYTES # BLD AUTO: 1.3 K/UL (ref 1–4.8)
LYMPHOCYTES NFR BLD: 8.6 % (ref 18–48)
MAGNESIUM SERPL-MCNC: 2.1 MG/DL (ref 1.6–2.6)
MCH RBC QN AUTO: 30.4 PG (ref 27–31)
MCHC RBC AUTO-ENTMCNC: 32.7 G/DL (ref 32–36)
MCV RBC AUTO: 93 FL (ref 82–98)
MONOCYTES # BLD AUTO: 0.8 K/UL (ref 0.3–1)
MONOCYTES NFR BLD: 5.4 % (ref 4–15)
NEUTROPHILS # BLD AUTO: 13.4 K/UL (ref 1.8–7.7)
NEUTROPHILS NFR BLD: 85.3 % (ref 38–73)
NRBC BLD-RTO: 0 /100 WBC
PLATELET # BLD AUTO: 179 K/UL (ref 150–450)
PMV BLD AUTO: 10.6 FL (ref 9.2–12.9)
POTASSIUM SERPL-SCNC: 4.3 MMOL/L (ref 3.5–5.1)
RBC # BLD AUTO: 4.21 M/UL (ref 4–5.4)
SODIUM SERPL-SCNC: 140 MMOL/L (ref 136–145)
WBC # BLD AUTO: 15.67 K/UL (ref 3.9–12.7)

## 2021-06-28 PROCEDURE — 63600175 PHARM REV CODE 636 W HCPCS: Performed by: PHYSICIAN ASSISTANT

## 2021-06-28 PROCEDURE — 94640 AIRWAY INHALATION TREATMENT: CPT

## 2021-06-28 PROCEDURE — 99239 PR HOSPITAL DISCHARGE DAY,>30 MIN: ICD-10-PCS | Mod: ,,, | Performed by: PHYSICIAN ASSISTANT

## 2021-06-28 PROCEDURE — 1111F PR DISCHARGE MEDS RECONCILED W/ CURRENT OUTPATIENT MED LIST: ICD-10-PCS | Mod: CPTII,,, | Performed by: PHYSICIAN ASSISTANT

## 2021-06-28 PROCEDURE — 94760 N-INVAS EAR/PLS OXIMETRY 1: CPT

## 2021-06-28 PROCEDURE — 27000221 HC OXYGEN, UP TO 24 HOURS

## 2021-06-28 PROCEDURE — 25000242 PHARM REV CODE 250 ALT 637 W/ HCPCS: Performed by: PHYSICIAN ASSISTANT

## 2021-06-28 PROCEDURE — 1111F DSCHRG MED/CURRENT MED MERGE: CPT | Mod: CPTII,,, | Performed by: PHYSICIAN ASSISTANT

## 2021-06-28 PROCEDURE — 85025 COMPLETE CBC W/AUTO DIFF WBC: CPT | Performed by: HOSPITALIST

## 2021-06-28 PROCEDURE — 83735 ASSAY OF MAGNESIUM: CPT | Performed by: HOSPITALIST

## 2021-06-28 PROCEDURE — 80048 BASIC METABOLIC PNL TOTAL CA: CPT | Performed by: HOSPITALIST

## 2021-06-28 PROCEDURE — 99900035 HC TECH TIME PER 15 MIN (STAT)

## 2021-06-28 PROCEDURE — 36415 COLL VENOUS BLD VENIPUNCTURE: CPT | Performed by: HOSPITALIST

## 2021-06-28 PROCEDURE — 25000003 PHARM REV CODE 250: Performed by: HOSPITALIST

## 2021-06-28 PROCEDURE — 99239 HOSP IP/OBS DSCHRG MGMT >30: CPT | Mod: ,,, | Performed by: PHYSICIAN ASSISTANT

## 2021-06-28 RX ORDER — LOSARTAN POTASSIUM 25 MG/1
12.5 TABLET ORAL DAILY
Qty: 15 TABLET | Refills: 11 | Status: SHIPPED | OUTPATIENT
Start: 2021-06-28 | End: 2021-07-09

## 2021-06-28 RX ORDER — PREDNISONE 20 MG/1
TABLET ORAL
Qty: 32 TABLET | Refills: 0 | Status: SHIPPED | OUTPATIENT
Start: 2021-06-28 | End: 2021-08-04

## 2021-06-28 RX ORDER — BUDESONIDE AND FORMOTEROL FUMARATE DIHYDRATE 80; 4.5 UG/1; UG/1
2 AEROSOL RESPIRATORY (INHALATION) 2 TIMES DAILY
Qty: 10.2 G | Refills: 3 | Status: SHIPPED | OUTPATIENT
Start: 2021-06-28 | End: 2021-08-18 | Stop reason: ALTCHOICE

## 2021-06-28 RX ORDER — FUROSEMIDE 20 MG/1
TABLET ORAL
Qty: 15 TABLET | Refills: 0 | Status: SHIPPED | OUTPATIENT
Start: 2021-06-28 | End: 2021-09-13 | Stop reason: SDUPTHER

## 2021-06-28 RX ORDER — ALBUTEROL SULFATE 0.63 MG/3ML
0.63 SOLUTION RESPIRATORY (INHALATION) EVERY 6 HOURS PRN
Qty: 75 ML | Refills: 0 | Status: SHIPPED | OUTPATIENT
Start: 2021-06-28 | End: 2022-05-12

## 2021-06-28 RX ADMIN — RIVAROXABAN 15 MG: 15 TABLET, FILM COATED ORAL at 04:06

## 2021-06-28 RX ADMIN — METOPROLOL SUCCINATE 100 MG: 100 TABLET, EXTENDED RELEASE ORAL at 09:06

## 2021-06-28 RX ADMIN — LEVALBUTEROL HYDROCHLORIDE 0.63 MG: 0.63 SOLUTION RESPIRATORY (INHALATION) at 08:06

## 2021-06-28 RX ADMIN — ATORVASTATIN CALCIUM 20 MG: 20 TABLET, FILM COATED ORAL at 09:06

## 2021-06-28 RX ADMIN — ASPIRIN 81 MG: 81 TABLET, COATED ORAL at 09:06

## 2021-06-28 RX ADMIN — LEVALBUTEROL HYDROCHLORIDE 0.63 MG: 0.63 SOLUTION RESPIRATORY (INHALATION) at 04:06

## 2021-06-28 RX ADMIN — PREDNISONE 60 MG: 20 TABLET ORAL at 09:06

## 2021-06-29 ENCOUNTER — NURSE TRIAGE (OUTPATIENT)
Dept: ADMINISTRATIVE | Facility: CLINIC | Age: 76
End: 2021-06-29

## 2021-06-29 ENCOUNTER — HOSPITAL ENCOUNTER (EMERGENCY)
Facility: HOSPITAL | Age: 76
Discharge: HOME | End: 2021-06-29
Attending: EMERGENCY MEDICINE
Payer: MEDICARE

## 2021-06-29 VITALS
WEIGHT: 170 LBS | DIASTOLIC BLOOD PRESSURE: 71 MMHG | RESPIRATION RATE: 16 BRPM | HEART RATE: 61 BPM | OXYGEN SATURATION: 98 % | TEMPERATURE: 98 F | BODY MASS INDEX: 30.11 KG/M2 | SYSTOLIC BLOOD PRESSURE: 128 MMHG

## 2021-06-29 DIAGNOSIS — R31.9 HEMATURIA, UNSPECIFIED TYPE: Primary | ICD-10-CM

## 2021-06-29 LAB
ALBUMIN SERPL BCP-MCNC: 3.4 G/DL (ref 3.5–5.2)
ALP SERPL-CCNC: 97 U/L (ref 55–135)
ALT SERPL W/O P-5'-P-CCNC: 42 U/L (ref 10–44)
ANION GAP SERPL CALC-SCNC: 10 MMOL/L (ref 8–16)
AST SERPL-CCNC: 49 U/L (ref 10–40)
BACTERIA #/AREA URNS AUTO: ABNORMAL /HPF
BASOPHILS # BLD AUTO: 0.01 K/UL (ref 0–0.2)
BASOPHILS NFR BLD: 0.1 % (ref 0–1.9)
BILIRUB SERPL-MCNC: 0.5 MG/DL (ref 0.1–1)
BILIRUB UR QL STRIP: NEGATIVE
BUN SERPL-MCNC: 52 MG/DL (ref 8–23)
CALCIUM SERPL-MCNC: 10.3 MG/DL (ref 8.7–10.5)
CHLORIDE SERPL-SCNC: 105 MMOL/L (ref 95–110)
CLARITY UR REFRACT.AUTO: ABNORMAL
CO2 SERPL-SCNC: 22 MMOL/L (ref 23–29)
COLOR UR AUTO: ABNORMAL
CREAT SERPL-MCNC: 1.4 MG/DL (ref 0.5–1.4)
DIFFERENTIAL METHOD: ABNORMAL
EOSINOPHIL # BLD AUTO: 0 K/UL (ref 0–0.5)
EOSINOPHIL NFR BLD: 0 % (ref 0–8)
ERYTHROCYTE [DISTWIDTH] IN BLOOD BY AUTOMATED COUNT: 13 % (ref 11.5–14.5)
EST. GFR  (AFRICAN AMERICAN): 41.8 ML/MIN/1.73 M^2
EST. GFR  (NON AFRICAN AMERICAN): 36.3 ML/MIN/1.73 M^2
GLUCOSE SERPL-MCNC: 113 MG/DL (ref 70–110)
GLUCOSE UR QL STRIP: NEGATIVE
HCT VFR BLD AUTO: 41 % (ref 37–48.5)
HGB BLD-MCNC: 13.2 G/DL (ref 12–16)
HGB UR QL STRIP: ABNORMAL
HYALINE CASTS UR QL AUTO: 0 /LPF
IMM GRANULOCYTES # BLD AUTO: 0.05 K/UL (ref 0–0.04)
IMM GRANULOCYTES NFR BLD AUTO: 0.5 % (ref 0–0.5)
KETONES UR QL STRIP: NEGATIVE
LEUKOCYTE ESTERASE UR QL STRIP: NEGATIVE
LYMPHOCYTES # BLD AUTO: 1.3 K/UL (ref 1–4.8)
LYMPHOCYTES NFR BLD: 13.8 % (ref 18–48)
MCH RBC QN AUTO: 29.3 PG (ref 27–31)
MCHC RBC AUTO-ENTMCNC: 32.2 G/DL (ref 32–36)
MCV RBC AUTO: 91 FL (ref 82–98)
MICROSCOPIC COMMENT: ABNORMAL
MONOCYTES # BLD AUTO: 0.3 K/UL (ref 0.3–1)
MONOCYTES NFR BLD: 3.3 % (ref 4–15)
NEUTROPHILS # BLD AUTO: 8 K/UL (ref 1.8–7.7)
NEUTROPHILS NFR BLD: 82.3 % (ref 38–73)
NITRITE UR QL STRIP: NEGATIVE
NRBC BLD-RTO: 0 /100 WBC
PH UR STRIP: 6 [PH] (ref 5–8)
PLATELET # BLD AUTO: 197 K/UL (ref 150–450)
PMV BLD AUTO: 10.7 FL (ref 9.2–12.9)
POTASSIUM SERPL-SCNC: 4.8 MMOL/L (ref 3.5–5.1)
PROT SERPL-MCNC: 7 G/DL (ref 6–8.4)
PROT UR QL STRIP: ABNORMAL
RBC # BLD AUTO: 4.51 M/UL (ref 4–5.4)
RBC #/AREA URNS AUTO: >100 /HPF (ref 0–4)
SODIUM SERPL-SCNC: 137 MMOL/L (ref 136–145)
SP GR UR STRIP: 1.01 (ref 1–1.03)
URN SPEC COLLECT METH UR: ABNORMAL
WBC # BLD AUTO: 9.74 K/UL (ref 3.9–12.7)
WBC #/AREA URNS AUTO: 0 /HPF (ref 0–5)

## 2021-06-29 PROCEDURE — 85025 COMPLETE CBC W/AUTO DIFF WBC: CPT | Performed by: EMERGENCY MEDICINE

## 2021-06-29 PROCEDURE — 99283 EMERGENCY DEPT VISIT LOW MDM: CPT

## 2021-06-29 PROCEDURE — 99285 EMERGENCY DEPT VISIT HI MDM: CPT | Mod: ,,, | Performed by: EMERGENCY MEDICINE

## 2021-06-29 PROCEDURE — 80053 COMPREHEN METABOLIC PANEL: CPT | Performed by: EMERGENCY MEDICINE

## 2021-06-29 PROCEDURE — 99285 PR EMERGENCY DEPT VISIT,LEVEL V: ICD-10-PCS | Mod: ,,, | Performed by: EMERGENCY MEDICINE

## 2021-06-29 PROCEDURE — 81001 URINALYSIS AUTO W/SCOPE: CPT | Performed by: EMERGENCY MEDICINE

## 2021-06-29 PROCEDURE — 86803 HEPATITIS C AB TEST: CPT | Performed by: EMERGENCY MEDICINE

## 2021-06-30 ENCOUNTER — TELEPHONE (OUTPATIENT)
Dept: HEPATOLOGY | Facility: CLINIC | Age: 76
End: 2021-06-30

## 2021-06-30 DIAGNOSIS — J43.2 CENTRILOBULAR EMPHYSEMA: Primary | ICD-10-CM

## 2021-06-30 LAB
BACTERIA BLD CULT: NORMAL
BACTERIA BLD CULT: NORMAL
HCV AB SERPL QL IA: NEGATIVE

## 2021-07-01 ENCOUNTER — PATIENT OUTREACH (OUTPATIENT)
Dept: ADMINISTRATIVE | Facility: CLINIC | Age: 76
End: 2021-07-01

## 2021-07-01 ENCOUNTER — TELEPHONE (OUTPATIENT)
Dept: CARDIOLOGY | Facility: CLINIC | Age: 76
End: 2021-07-01

## 2021-07-01 DIAGNOSIS — J96.01 ACUTE HYPOXEMIC RESPIRATORY FAILURE: Primary | ICD-10-CM

## 2021-07-09 ENCOUNTER — OFFICE VISIT (OUTPATIENT)
Dept: INTERNAL MEDICINE | Facility: CLINIC | Age: 76
End: 2021-07-09
Payer: MEDICARE

## 2021-07-09 VITALS
HEART RATE: 53 BPM | WEIGHT: 175.69 LBS | BODY MASS INDEX: 31.13 KG/M2 | OXYGEN SATURATION: 95 % | DIASTOLIC BLOOD PRESSURE: 64 MMHG | HEIGHT: 63 IN | SYSTOLIC BLOOD PRESSURE: 142 MMHG

## 2021-07-09 DIAGNOSIS — I50.32 CHRONIC DIASTOLIC CHF (CONGESTIVE HEART FAILURE): ICD-10-CM

## 2021-07-09 DIAGNOSIS — N18.32 STAGE 3B CHRONIC KIDNEY DISEASE: ICD-10-CM

## 2021-07-09 DIAGNOSIS — J44.9 CHRONIC OBSTRUCTIVE PULMONARY DISEASE, UNSPECIFIED COPD TYPE: ICD-10-CM

## 2021-07-09 DIAGNOSIS — I10 ESSENTIAL HYPERTENSION: ICD-10-CM

## 2021-07-09 DIAGNOSIS — Z09 HOSPITAL DISCHARGE FOLLOW-UP: ICD-10-CM

## 2021-07-09 DIAGNOSIS — I48.0 PAROXYSMAL ATRIAL FIBRILLATION: ICD-10-CM

## 2021-07-09 PROCEDURE — 1101F PT FALLS ASSESS-DOCD LE1/YR: CPT | Mod: CPTII,S$GLB,, | Performed by: INTERNAL MEDICINE

## 2021-07-09 PROCEDURE — 3288F PR FALLS RISK ASSESSMENT DOCUMENTED: ICD-10-PCS | Mod: CPTII,S$GLB,, | Performed by: INTERNAL MEDICINE

## 2021-07-09 PROCEDURE — 1111F PR DISCHARGE MEDS RECONCILED W/ CURRENT OUTPATIENT MED LIST: ICD-10-PCS | Mod: CPTII,S$GLB,, | Performed by: INTERNAL MEDICINE

## 2021-07-09 PROCEDURE — 99999 PR PBB SHADOW E&M-EST. PATIENT-LVL III: CPT | Mod: PBBFAC,,, | Performed by: INTERNAL MEDICINE

## 2021-07-09 PROCEDURE — 99999 PR PBB SHADOW E&M-EST. PATIENT-LVL III: ICD-10-PCS | Mod: PBBFAC,,, | Performed by: INTERNAL MEDICINE

## 2021-07-09 PROCEDURE — 1159F PR MEDICATION LIST DOCUMENTED IN MEDICAL RECORD: ICD-10-PCS | Mod: S$GLB,,, | Performed by: INTERNAL MEDICINE

## 2021-07-09 PROCEDURE — 1111F DSCHRG MED/CURRENT MED MERGE: CPT | Mod: CPTII,S$GLB,, | Performed by: INTERNAL MEDICINE

## 2021-07-09 PROCEDURE — 3288F FALL RISK ASSESSMENT DOCD: CPT | Mod: CPTII,S$GLB,, | Performed by: INTERNAL MEDICINE

## 2021-07-09 PROCEDURE — 1126F PR PAIN SEVERITY QUANTIFIED, NO PAIN PRESENT: ICD-10-PCS | Mod: S$GLB,,, | Performed by: INTERNAL MEDICINE

## 2021-07-09 PROCEDURE — 1101F PR PT FALLS ASSESS DOC 0-1 FALLS W/OUT INJ PAST YR: ICD-10-PCS | Mod: CPTII,S$GLB,, | Performed by: INTERNAL MEDICINE

## 2021-07-09 PROCEDURE — 99214 OFFICE O/P EST MOD 30 MIN: CPT | Mod: S$GLB,,, | Performed by: INTERNAL MEDICINE

## 2021-07-09 PROCEDURE — 99499 UNLISTED E&M SERVICE: CPT | Mod: S$GLB,,, | Performed by: INTERNAL MEDICINE

## 2021-07-09 PROCEDURE — 99214 PR OFFICE/OUTPT VISIT, EST, LEVL IV, 30-39 MIN: ICD-10-PCS | Mod: S$GLB,,, | Performed by: INTERNAL MEDICINE

## 2021-07-09 PROCEDURE — 1159F MED LIST DOCD IN RCRD: CPT | Mod: S$GLB,,, | Performed by: INTERNAL MEDICINE

## 2021-07-09 PROCEDURE — 99499 RISK ADDL DX/OHS AUDIT: ICD-10-PCS | Mod: S$GLB,,, | Performed by: INTERNAL MEDICINE

## 2021-07-09 PROCEDURE — 1126F AMNT PAIN NOTED NONE PRSNT: CPT | Mod: S$GLB,,, | Performed by: INTERNAL MEDICINE

## 2021-07-09 RX ORDER — LOSARTAN POTASSIUM 25 MG/1
25 TABLET ORAL DAILY
COMMUNITY
End: 2021-12-17 | Stop reason: SDUPTHER

## 2021-07-14 ENCOUNTER — OFFICE VISIT (OUTPATIENT)
Dept: HOME HEALTH SERVICES | Facility: CLINIC | Age: 76
End: 2021-07-14
Payer: MEDICARE

## 2021-07-14 VITALS
SYSTOLIC BLOOD PRESSURE: 129 MMHG | TEMPERATURE: 98 F | HEART RATE: 72 BPM | RESPIRATION RATE: 18 BRPM | WEIGHT: 175 LBS | BODY MASS INDEX: 31.01 KG/M2 | OXYGEN SATURATION: 98 % | HEIGHT: 63 IN | DIASTOLIC BLOOD PRESSURE: 80 MMHG

## 2021-07-14 DIAGNOSIS — J96.01 ACUTE HYPOXEMIC RESPIRATORY FAILURE: ICD-10-CM

## 2021-07-14 PROCEDURE — 1101F PT FALLS ASSESS-DOCD LE1/YR: CPT | Mod: CPTII,S$GLB,, | Performed by: NURSE PRACTITIONER

## 2021-07-14 PROCEDURE — 99499 UNLISTED E&M SERVICE: CPT | Mod: S$GLB,,, | Performed by: NURSE PRACTITIONER

## 2021-07-14 PROCEDURE — 3288F FALL RISK ASSESSMENT DOCD: CPT | Mod: CPTII,S$GLB,, | Performed by: NURSE PRACTITIONER

## 2021-07-14 PROCEDURE — 99350 HOME/RES VST EST HIGH MDM 60: CPT | Mod: S$GLB,,, | Performed by: NURSE PRACTITIONER

## 2021-07-14 PROCEDURE — 99497 ADVNCD CARE PLAN 30 MIN: CPT | Mod: S$GLB,,, | Performed by: NURSE PRACTITIONER

## 2021-07-14 PROCEDURE — 1126F AMNT PAIN NOTED NONE PRSNT: CPT | Mod: S$GLB,,, | Performed by: NURSE PRACTITIONER

## 2021-07-14 PROCEDURE — 1101F PR PT FALLS ASSESS DOC 0-1 FALLS W/OUT INJ PAST YR: ICD-10-PCS | Mod: CPTII,S$GLB,, | Performed by: NURSE PRACTITIONER

## 2021-07-14 PROCEDURE — 99350 PR HOME VISIT,ESTAB PATIENT,LEVEL IV: ICD-10-PCS | Mod: S$GLB,,, | Performed by: NURSE PRACTITIONER

## 2021-07-14 PROCEDURE — 99499 RISK ADDL DX/OHS AUDIT: ICD-10-PCS | Mod: S$GLB,,, | Performed by: NURSE PRACTITIONER

## 2021-07-14 PROCEDURE — 1126F PR PAIN SEVERITY QUANTIFIED, NO PAIN PRESENT: ICD-10-PCS | Mod: S$GLB,,, | Performed by: NURSE PRACTITIONER

## 2021-07-14 PROCEDURE — 3288F PR FALLS RISK ASSESSMENT DOCUMENTED: ICD-10-PCS | Mod: CPTII,S$GLB,, | Performed by: NURSE PRACTITIONER

## 2021-07-14 PROCEDURE — 99497 PR ADVNCD CARE PLAN 30 MIN: ICD-10-PCS | Mod: S$GLB,,, | Performed by: NURSE PRACTITIONER

## 2021-07-14 RX ORDER — ONDANSETRON 4 MG/1
4 TABLET, FILM COATED ORAL EVERY 8 HOURS PRN
Qty: 15 TABLET | Refills: 0 | Status: SHIPPED | OUTPATIENT
Start: 2021-07-14 | End: 2022-09-21 | Stop reason: ALTCHOICE

## 2021-07-16 DIAGNOSIS — N18.31 STAGE 3A CHRONIC KIDNEY DISEASE: Primary | ICD-10-CM

## 2021-07-22 ENCOUNTER — PATIENT MESSAGE (OUTPATIENT)
Dept: INTERNAL MEDICINE | Facility: CLINIC | Age: 76
End: 2021-07-22

## 2021-07-26 ENCOUNTER — OFFICE VISIT (OUTPATIENT)
Dept: INTERNAL MEDICINE | Facility: CLINIC | Age: 76
End: 2021-07-26
Payer: MEDICARE

## 2021-07-26 VITALS
HEIGHT: 63 IN | TEMPERATURE: 98 F | WEIGHT: 172.38 LBS | HEART RATE: 68 BPM | SYSTOLIC BLOOD PRESSURE: 124 MMHG | DIASTOLIC BLOOD PRESSURE: 64 MMHG | BODY MASS INDEX: 30.54 KG/M2

## 2021-07-26 DIAGNOSIS — J30.1 SEASONAL ALLERGIC RHINITIS DUE TO POLLEN: ICD-10-CM

## 2021-07-26 DIAGNOSIS — B34.9 VIRAL ILLNESS: ICD-10-CM

## 2021-07-26 PROCEDURE — 1111F PR DISCHARGE MEDS RECONCILED W/ CURRENT OUTPATIENT MED LIST: ICD-10-PCS | Mod: CPTII,S$GLB,, | Performed by: INTERNAL MEDICINE

## 2021-07-26 PROCEDURE — 99999 PR PBB SHADOW E&M-EST. PATIENT-LVL IV: CPT | Mod: PBBFAC,,, | Performed by: INTERNAL MEDICINE

## 2021-07-26 PROCEDURE — 3288F FALL RISK ASSESSMENT DOCD: CPT | Mod: CPTII,S$GLB,, | Performed by: INTERNAL MEDICINE

## 2021-07-26 PROCEDURE — 1126F AMNT PAIN NOTED NONE PRSNT: CPT | Mod: CPTII,S$GLB,, | Performed by: INTERNAL MEDICINE

## 2021-07-26 PROCEDURE — 99213 PR OFFICE/OUTPT VISIT, EST, LEVL III, 20-29 MIN: ICD-10-PCS | Mod: S$GLB,,, | Performed by: INTERNAL MEDICINE

## 2021-07-26 PROCEDURE — 1159F PR MEDICATION LIST DOCUMENTED IN MEDICAL RECORD: ICD-10-PCS | Mod: CPTII,S$GLB,, | Performed by: INTERNAL MEDICINE

## 2021-07-26 PROCEDURE — 3074F PR MOST RECENT SYSTOLIC BLOOD PRESSURE < 130 MM HG: ICD-10-PCS | Mod: CPTII,S$GLB,, | Performed by: INTERNAL MEDICINE

## 2021-07-26 PROCEDURE — 1111F DSCHRG MED/CURRENT MED MERGE: CPT | Mod: CPTII,S$GLB,, | Performed by: INTERNAL MEDICINE

## 2021-07-26 PROCEDURE — 1101F PT FALLS ASSESS-DOCD LE1/YR: CPT | Mod: CPTII,S$GLB,, | Performed by: INTERNAL MEDICINE

## 2021-07-26 PROCEDURE — 3078F DIAST BP <80 MM HG: CPT | Mod: CPTII,S$GLB,, | Performed by: INTERNAL MEDICINE

## 2021-07-26 PROCEDURE — 1126F PR PAIN SEVERITY QUANTIFIED, NO PAIN PRESENT: ICD-10-PCS | Mod: CPTII,S$GLB,, | Performed by: INTERNAL MEDICINE

## 2021-07-26 PROCEDURE — 99999 PR PBB SHADOW E&M-EST. PATIENT-LVL IV: ICD-10-PCS | Mod: PBBFAC,,, | Performed by: INTERNAL MEDICINE

## 2021-07-26 PROCEDURE — 3288F PR FALLS RISK ASSESSMENT DOCUMENTED: ICD-10-PCS | Mod: CPTII,S$GLB,, | Performed by: INTERNAL MEDICINE

## 2021-07-26 PROCEDURE — 3078F PR MOST RECENT DIASTOLIC BLOOD PRESSURE < 80 MM HG: ICD-10-PCS | Mod: CPTII,S$GLB,, | Performed by: INTERNAL MEDICINE

## 2021-07-26 PROCEDURE — 1159F MED LIST DOCD IN RCRD: CPT | Mod: CPTII,S$GLB,, | Performed by: INTERNAL MEDICINE

## 2021-07-26 PROCEDURE — 3074F SYST BP LT 130 MM HG: CPT | Mod: CPTII,S$GLB,, | Performed by: INTERNAL MEDICINE

## 2021-07-26 PROCEDURE — 99213 OFFICE O/P EST LOW 20 MIN: CPT | Mod: S$GLB,,, | Performed by: INTERNAL MEDICINE

## 2021-07-26 PROCEDURE — 1101F PR PT FALLS ASSESS DOC 0-1 FALLS W/OUT INJ PAST YR: ICD-10-PCS | Mod: CPTII,S$GLB,, | Performed by: INTERNAL MEDICINE

## 2021-08-02 DIAGNOSIS — J43.2 CENTRILOBULAR EMPHYSEMA: Primary | ICD-10-CM

## 2021-08-03 ENCOUNTER — LAB VISIT (OUTPATIENT)
Dept: FAMILY MEDICINE | Facility: CLINIC | Age: 76
End: 2021-08-03
Payer: MEDICARE

## 2021-08-03 DIAGNOSIS — R06.02 SHORTNESS OF BREATH: ICD-10-CM

## 2021-08-03 DIAGNOSIS — R06.02 SHORTNESS OF BREATH: Primary | ICD-10-CM

## 2021-08-03 PROCEDURE — U0003 INFECTIOUS AGENT DETECTION BY NUCLEIC ACID (DNA OR RNA); SEVERE ACUTE RESPIRATORY SYNDROME CORONAVIRUS 2 (SARS-COV-2) (CORONAVIRUS DISEASE [COVID-19]), AMPLIFIED PROBE TECHNIQUE, MAKING USE OF HIGH THROUGHPUT TECHNOLOGIES AS DESCRIBED BY CMS-2020-01-R: HCPCS | Performed by: INTERNAL MEDICINE

## 2021-08-03 PROCEDURE — U0005 INFEC AGEN DETEC AMPLI PROBE: HCPCS | Performed by: INTERNAL MEDICINE

## 2021-08-04 ENCOUNTER — HOSPITAL ENCOUNTER (OUTPATIENT)
Dept: PULMONOLOGY | Facility: CLINIC | Age: 76
Discharge: HOME | End: 2021-08-04
Payer: MEDICARE

## 2021-08-04 ENCOUNTER — OFFICE VISIT (OUTPATIENT)
Dept: PULMONOLOGY | Facility: CLINIC | Age: 76
End: 2021-08-04
Payer: MEDICARE

## 2021-08-04 VITALS
WEIGHT: 173 LBS | HEART RATE: 73 BPM | SYSTOLIC BLOOD PRESSURE: 126 MMHG | BODY MASS INDEX: 30.65 KG/M2 | HEIGHT: 63 IN | DIASTOLIC BLOOD PRESSURE: 72 MMHG | OXYGEN SATURATION: 74 %

## 2021-08-04 DIAGNOSIS — J43.2 CENTRILOBULAR EMPHYSEMA: ICD-10-CM

## 2021-08-04 DIAGNOSIS — J96.01 ACUTE HYPOXEMIC RESPIRATORY FAILURE: ICD-10-CM

## 2021-08-04 DIAGNOSIS — R06.89 DYSPNEA AND RESPIRATORY ABNORMALITY: Primary | ICD-10-CM

## 2021-08-04 DIAGNOSIS — R06.02 SHORTNESS OF BREATH: ICD-10-CM

## 2021-08-04 DIAGNOSIS — R06.00 DYSPNEA AND RESPIRATORY ABNORMALITY: Primary | ICD-10-CM

## 2021-08-04 LAB
SARS-COV-2 RNA RESP QL NAA+PROBE: NOT DETECTED
SARS-COV-2- CYCLE NUMBER: -1

## 2021-08-04 PROCEDURE — 94727 GAS DIL/WSHOT DETER LNG VOL: CPT | Mod: S$GLB,,, | Performed by: INTERNAL MEDICINE

## 2021-08-04 PROCEDURE — 3288F PR FALLS RISK ASSESSMENT DOCUMENTED: ICD-10-PCS | Mod: CPTII,S$GLB,, | Performed by: INTERNAL MEDICINE

## 2021-08-04 PROCEDURE — 99999 PR PBB SHADOW E&M-EST. PATIENT-LVL III: ICD-10-PCS | Mod: PBBFAC,,, | Performed by: INTERNAL MEDICINE

## 2021-08-04 PROCEDURE — 94010 BREATHING CAPACITY TEST: ICD-10-PCS | Mod: S$GLB,,, | Performed by: INTERNAL MEDICINE

## 2021-08-04 PROCEDURE — 99999 PR PBB SHADOW E&M-EST. PATIENT-LVL III: CPT | Mod: PBBFAC,,, | Performed by: INTERNAL MEDICINE

## 2021-08-04 PROCEDURE — 3078F DIAST BP <80 MM HG: CPT | Mod: CPTII,S$GLB,, | Performed by: INTERNAL MEDICINE

## 2021-08-04 PROCEDURE — 99204 OFFICE O/P NEW MOD 45 MIN: CPT | Mod: S$GLB,,, | Performed by: INTERNAL MEDICINE

## 2021-08-04 PROCEDURE — 94010 BREATHING CAPACITY TEST: CPT | Mod: S$GLB,,, | Performed by: INTERNAL MEDICINE

## 2021-08-04 PROCEDURE — 3078F PR MOST RECENT DIASTOLIC BLOOD PRESSURE < 80 MM HG: ICD-10-PCS | Mod: CPTII,S$GLB,, | Performed by: INTERNAL MEDICINE

## 2021-08-04 PROCEDURE — 1126F AMNT PAIN NOTED NONE PRSNT: CPT | Mod: CPTII,S$GLB,, | Performed by: INTERNAL MEDICINE

## 2021-08-04 PROCEDURE — 1101F PT FALLS ASSESS-DOCD LE1/YR: CPT | Mod: CPTII,S$GLB,, | Performed by: INTERNAL MEDICINE

## 2021-08-04 PROCEDURE — 3288F FALL RISK ASSESSMENT DOCD: CPT | Mod: CPTII,S$GLB,, | Performed by: INTERNAL MEDICINE

## 2021-08-04 PROCEDURE — 1159F PR MEDICATION LIST DOCUMENTED IN MEDICAL RECORD: ICD-10-PCS | Mod: CPTII,S$GLB,, | Performed by: INTERNAL MEDICINE

## 2021-08-04 PROCEDURE — 94729 DIFFUSING CAPACITY: CPT | Mod: S$GLB,,, | Performed by: INTERNAL MEDICINE

## 2021-08-04 PROCEDURE — 1101F PR PT FALLS ASSESS DOC 0-1 FALLS W/OUT INJ PAST YR: ICD-10-PCS | Mod: CPTII,S$GLB,, | Performed by: INTERNAL MEDICINE

## 2021-08-04 PROCEDURE — 94729 PR C02/MEMBANE DIFFUSE CAPACITY: ICD-10-PCS | Mod: S$GLB,,, | Performed by: INTERNAL MEDICINE

## 2021-08-04 PROCEDURE — 1159F MED LIST DOCD IN RCRD: CPT | Mod: CPTII,S$GLB,, | Performed by: INTERNAL MEDICINE

## 2021-08-04 PROCEDURE — 1126F PR PAIN SEVERITY QUANTIFIED, NO PAIN PRESENT: ICD-10-PCS | Mod: CPTII,S$GLB,, | Performed by: INTERNAL MEDICINE

## 2021-08-04 PROCEDURE — 94727 PR PULM FUNCTION TEST BY GAS: ICD-10-PCS | Mod: S$GLB,,, | Performed by: INTERNAL MEDICINE

## 2021-08-04 PROCEDURE — 3074F PR MOST RECENT SYSTOLIC BLOOD PRESSURE < 130 MM HG: ICD-10-PCS | Mod: CPTII,S$GLB,, | Performed by: INTERNAL MEDICINE

## 2021-08-04 PROCEDURE — 3074F SYST BP LT 130 MM HG: CPT | Mod: CPTII,S$GLB,, | Performed by: INTERNAL MEDICINE

## 2021-08-04 PROCEDURE — 99204 PR OFFICE/OUTPT VISIT, NEW, LEVL IV, 45-59 MIN: ICD-10-PCS | Mod: S$GLB,,, | Performed by: INTERNAL MEDICINE

## 2021-08-11 ENCOUNTER — TELEPHONE (OUTPATIENT)
Dept: PULMONOLOGY | Facility: CLINIC | Age: 76
End: 2021-08-11

## 2021-08-17 DIAGNOSIS — R06.00 DYSPNEA AND RESPIRATORY ABNORMALITY: ICD-10-CM

## 2021-08-17 DIAGNOSIS — R06.89 DYSPNEA AND RESPIRATORY ABNORMALITY: ICD-10-CM

## 2021-08-17 DIAGNOSIS — J43.2 CENTRILOBULAR EMPHYSEMA: Primary | ICD-10-CM

## 2021-08-18 ENCOUNTER — TELEPHONE (OUTPATIENT)
Dept: PULMONOLOGY | Facility: CLINIC | Age: 76
End: 2021-08-18

## 2021-08-18 DIAGNOSIS — J45.909 ASTHMA, UNSPECIFIED ASTHMA SEVERITY, UNSPECIFIED WHETHER COMPLICATED, UNSPECIFIED WHETHER PERSISTENT: ICD-10-CM

## 2021-08-18 DIAGNOSIS — J44.1 ASTHMA EXACERBATION IN COPD: Primary | ICD-10-CM

## 2021-08-18 DIAGNOSIS — J45.901 ASTHMA EXACERBATION IN COPD: Primary | ICD-10-CM

## 2021-08-18 RX ORDER — BUDESONIDE, GLYCOPYRROLATE, AND FORMOTEROL FUMARATE 160; 9; 4.8 UG/1; UG/1; UG/1
2 AEROSOL, METERED RESPIRATORY (INHALATION) 2 TIMES DAILY
Qty: 32.1 G | Refills: 3 | Status: SHIPPED | OUTPATIENT
Start: 2021-08-18 | End: 2022-08-22 | Stop reason: SDUPTHER

## 2021-09-14 RX ORDER — FUROSEMIDE 20 MG/1
TABLET ORAL
Qty: 30 TABLET | Refills: 6 | Status: SHIPPED | OUTPATIENT
Start: 2021-09-14 | End: 2022-06-08 | Stop reason: SDUPTHER

## 2021-09-14 RX ORDER — METOPROLOL SUCCINATE 100 MG/1
100 TABLET, EXTENDED RELEASE ORAL DAILY
Qty: 30 TABLET | Refills: 6 | Status: SHIPPED | OUTPATIENT
Start: 2021-09-14 | End: 2022-07-20 | Stop reason: SDUPTHER

## 2021-11-12 ENCOUNTER — LAB VISIT (OUTPATIENT)
Dept: LAB | Facility: HOSPITAL | Age: 76
End: 2021-11-12
Attending: INTERNAL MEDICINE
Payer: MEDICARE

## 2021-11-12 DIAGNOSIS — I10 ESSENTIAL HYPERTENSION: ICD-10-CM

## 2021-11-12 LAB
ALBUMIN SERPL BCP-MCNC: 3.3 G/DL (ref 3.5–5.2)
ALP SERPL-CCNC: 80 U/L (ref 55–135)
ALT SERPL W/O P-5'-P-CCNC: 26 U/L (ref 10–44)
ANION GAP SERPL CALC-SCNC: 8 MMOL/L (ref 8–16)
AST SERPL-CCNC: 34 U/L (ref 10–40)
BILIRUB SERPL-MCNC: 0.9 MG/DL (ref 0.1–1)
BUN SERPL-MCNC: 22 MG/DL (ref 8–23)
CALCIUM SERPL-MCNC: 10.5 MG/DL (ref 8.7–10.5)
CHLORIDE SERPL-SCNC: 104 MMOL/L (ref 95–110)
CO2 SERPL-SCNC: 27 MMOL/L (ref 23–29)
CREAT SERPL-MCNC: 1.2 MG/DL (ref 0.5–1.4)
EST. GFR  (AFRICAN AMERICAN): 50 ML/MIN/1.73 M^2
EST. GFR  (NON AFRICAN AMERICAN): 43.4 ML/MIN/1.73 M^2
GLUCOSE SERPL-MCNC: 80 MG/DL (ref 70–110)
POTASSIUM SERPL-SCNC: 4 MMOL/L (ref 3.5–5.1)
PROT SERPL-MCNC: 6.5 G/DL (ref 6–8.4)
SODIUM SERPL-SCNC: 139 MMOL/L (ref 136–145)

## 2021-11-12 PROCEDURE — 80053 COMPREHEN METABOLIC PANEL: CPT | Performed by: INTERNAL MEDICINE

## 2021-11-12 PROCEDURE — 36415 COLL VENOUS BLD VENIPUNCTURE: CPT | Mod: PO | Performed by: INTERNAL MEDICINE

## 2021-12-05 PROBLEM — I50.30 HEART FAILURE WITH PRESERVED EJECTION FRACTION: Status: ACTIVE | Noted: 2021-12-05

## 2021-12-06 ENCOUNTER — OFFICE VISIT (OUTPATIENT)
Dept: CARDIOLOGY | Facility: CLINIC | Age: 76
End: 2021-12-06
Payer: MEDICARE

## 2021-12-06 VITALS
HEART RATE: 74 BPM | OXYGEN SATURATION: 88 % | WEIGHT: 173.06 LBS | HEIGHT: 64 IN | BODY MASS INDEX: 29.55 KG/M2 | DIASTOLIC BLOOD PRESSURE: 79 MMHG | SYSTOLIC BLOOD PRESSURE: 182 MMHG

## 2021-12-06 DIAGNOSIS — I10 ESSENTIAL HYPERTENSION: ICD-10-CM

## 2021-12-06 DIAGNOSIS — I48.0 PAROXYSMAL ATRIAL FIBRILLATION: Primary | ICD-10-CM

## 2021-12-06 DIAGNOSIS — E66.9 OBESITY (BMI 30.0-34.9): ICD-10-CM

## 2021-12-06 DIAGNOSIS — I50.30 HEART FAILURE WITH PRESERVED EJECTION FRACTION, UNSPECIFIED HF CHRONICITY: ICD-10-CM

## 2021-12-06 DIAGNOSIS — E78.5 HYPERLIPIDEMIA, UNSPECIFIED HYPERLIPIDEMIA TYPE: ICD-10-CM

## 2021-12-06 DIAGNOSIS — I70.0 AORTIC ATHEROSCLEROSIS: ICD-10-CM

## 2021-12-06 DIAGNOSIS — I77.89 OTHER SPECIFIED DISORDERS OF ARTERIES AND ARTERIOLES: ICD-10-CM

## 2021-12-06 DIAGNOSIS — I83.893 VARICOSE VEINS OF BOTH LEGS WITH EDEMA: ICD-10-CM

## 2021-12-06 DIAGNOSIS — N18.32 STAGE 3B CHRONIC KIDNEY DISEASE: ICD-10-CM

## 2021-12-06 PROCEDURE — 99999 PR PBB SHADOW E&M-EST. PATIENT-LVL IV: ICD-10-PCS | Mod: PBBFAC,,, | Performed by: INTERNAL MEDICINE

## 2021-12-06 PROCEDURE — 99499 UNLISTED E&M SERVICE: CPT | Mod: S$GLB,,, | Performed by: INTERNAL MEDICINE

## 2021-12-06 PROCEDURE — 99999 PR PBB SHADOW E&M-EST. PATIENT-LVL IV: CPT | Mod: PBBFAC,,, | Performed by: INTERNAL MEDICINE

## 2021-12-06 PROCEDURE — 99214 PR OFFICE/OUTPT VISIT, EST, LEVL IV, 30-39 MIN: ICD-10-PCS | Mod: S$GLB,,, | Performed by: INTERNAL MEDICINE

## 2021-12-06 PROCEDURE — 99499 RISK ADDL DX/OHS AUDIT: ICD-10-PCS | Mod: S$GLB,,, | Performed by: INTERNAL MEDICINE

## 2021-12-06 PROCEDURE — 99214 OFFICE O/P EST MOD 30 MIN: CPT | Mod: S$GLB,,, | Performed by: INTERNAL MEDICINE

## 2021-12-09 ENCOUNTER — HOSPITAL ENCOUNTER (OUTPATIENT)
Dept: CARDIOLOGY | Facility: HOSPITAL | Age: 76
Discharge: HOME | End: 2021-12-09
Attending: INTERNAL MEDICINE
Payer: MEDICARE

## 2021-12-09 DIAGNOSIS — I77.89 OTHER SPECIFIED DISORDERS OF ARTERIES AND ARTERIOLES: ICD-10-CM

## 2021-12-09 DIAGNOSIS — I70.0 AORTIC ATHEROSCLEROSIS: ICD-10-CM

## 2021-12-09 LAB
LEFT CBA DIAS: 29 CM/S
LEFT CBA SYS: 100 CM/S
LEFT CCA DIST DIAS: 13 CM/S
LEFT CCA DIST SYS: 58 CM/S
LEFT CCA MID DIAS: 16 CM/S
LEFT CCA MID SYS: 63 CM/S
LEFT CCA PROX DIAS: 15 CM/S
LEFT CCA PROX SYS: 59 CM/S
LEFT ECA DIAS: 9 CM/S
LEFT ECA SYS: 76 CM/S
LEFT ICA DIST DIAS: 17 CM/S
LEFT ICA DIST SYS: 57 CM/S
LEFT ICA MID DIAS: 28 CM/S
LEFT ICA MID SYS: 125 CM/S
LEFT ICA PROX DIAS: 42 CM/S
LEFT ICA PROX SYS: 138 CM/S
LEFT VERTEBRAL DIAS: 13 CM/S
LEFT VERTEBRAL SYS: 46 CM/S
OHS CV CAROTID RIGHT ICA EDV HIGHEST: 21
OHS CV CAROTID ULTRASOUND LEFT ICA/CCA RATIO: 2.38
OHS CV CAROTID ULTRASOUND RIGHT ICA/CCA RATIO: 1.14
OHS CV PV CAROTID LEFT HIGHEST CCA: 63
OHS CV PV CAROTID LEFT HIGHEST ICA: 138
OHS CV PV CAROTID RIGHT HIGHEST CCA: 61
OHS CV PV CAROTID RIGHT HIGHEST ICA: 66
OHS CV US CAROTID LEFT HIGHEST EDV: 42
RIGHT ARM DIASTOLIC BLOOD PRESSURE: 56 MMHG
RIGHT ARM SYSTOLIC BLOOD PRESSURE: 118 MMHG
RIGHT CBA DIAS: 13 CM/S
RIGHT CBA SYS: 50 CM/S
RIGHT CCA DIST DIAS: 14 CM/S
RIGHT CCA DIST SYS: 58 CM/S
RIGHT CCA MID DIAS: 12 CM/S
RIGHT CCA MID SYS: 61 CM/S
RIGHT CCA PROX DIAS: 11 CM/S
RIGHT CCA PROX SYS: 59 CM/S
RIGHT ECA DIAS: 9 CM/S
RIGHT ECA SYS: 77 CM/S
RIGHT ICA DIST DIAS: 21 CM/S
RIGHT ICA DIST SYS: 66 CM/S
RIGHT ICA MID DIAS: 15 CM/S
RIGHT ICA MID SYS: 58 CM/S
RIGHT ICA PROX DIAS: 16 CM/S
RIGHT ICA PROX SYS: 55 CM/S
RIGHT VERTEBRAL DIAS: 9 CM/S
RIGHT VERTEBRAL SYS: 34 CM/S

## 2021-12-09 PROCEDURE — 93880 CV US DOPPLER CAROTID (CUPID ONLY): ICD-10-PCS | Mod: 26,,, | Performed by: INTERNAL MEDICINE

## 2021-12-09 PROCEDURE — 93880 EXTRACRANIAL BILAT STUDY: CPT

## 2021-12-09 PROCEDURE — 93880 EXTRACRANIAL BILAT STUDY: CPT | Mod: 26,,, | Performed by: INTERNAL MEDICINE

## 2021-12-09 RX ORDER — LOSARTAN POTASSIUM 50 MG/1
TABLET ORAL
Refills: 0 | OUTPATIENT
Start: 2021-12-09

## 2021-12-10 ENCOUNTER — HOSPITAL ENCOUNTER (OUTPATIENT)
Dept: CARDIOLOGY | Facility: HOSPITAL | Age: 76
Discharge: HOME | End: 2021-12-10
Attending: INTERNAL MEDICINE
Payer: MEDICARE

## 2021-12-10 DIAGNOSIS — I83.893 VARICOSE VEINS OF BOTH LEGS WITH EDEMA: ICD-10-CM

## 2021-12-10 LAB
LEFT GIAC DIA: 0.27 MM
LEFT GREAT SAPHENOUS DISTAL THIGH DIA: 0.71 CM
LEFT GREAT SAPHENOUS DISTAL THIGH REFLUX: 1050 MS
LEFT GREAT SAPHENOUS JUNCTION DIA: 1.3 CM
LEFT GREAT SAPHENOUS JUNCTION REFLUX: 2380 MS
LEFT GREAT SAPHENOUS KNEE DIA: 0.66 CM
LEFT GREAT SAPHENOUS KNEE REFLUX: 2038 MS
LEFT GREAT SAPHENOUS MIDDLE THIGH DIA: 1 CM
LEFT GREAT SAPHENOUS MIDDLE THIGH REFLUX: 2020 MS
LEFT GREAT SAPHENOUS PROXIMAL CALF DIA: 0.64 CM
RIGHT GIAC DIA: 0.33 MM
RIGHT GREAT SAPHENOUS DISTAL THIGH DIA: 0.9 CM
RIGHT GREAT SAPHENOUS DISTAL THIGH REFLUX: 1592 MS
RIGHT GREAT SAPHENOUS JUNCTION DIA: 0.97 CM
RIGHT GREAT SAPHENOUS JUNCTION REFLUX: 800 MS
RIGHT GREAT SAPHENOUS KNEE DIA: 0.7 CM
RIGHT GREAT SAPHENOUS KNEE REFLUX: 1027 MS
RIGHT GREAT SAPHENOUS MIDDLE THIGH DIA: 1 CM
RIGHT GREAT SAPHENOUS MIDDLE THIGH REFLUX: 633 MS
RIGHT GREAT SAPHENOUS PROXIMAL CALF DIA: 0.78 CM

## 2021-12-10 PROCEDURE — 93970 EXTREMITY STUDY: CPT | Mod: TC

## 2021-12-10 PROCEDURE — 93970 EXTREMITY STUDY: CPT | Mod: 26,,, | Performed by: INTERNAL MEDICINE

## 2021-12-10 PROCEDURE — 93970 CV US LOWER VENOUS INSUFFICIENCY BILATERAL (CUPID ONLY): ICD-10-PCS | Mod: 26,,, | Performed by: INTERNAL MEDICINE

## 2021-12-13 DIAGNOSIS — I83.893 VARICOSE VEINS OF BOTH LEGS WITH EDEMA: Primary | ICD-10-CM

## 2021-12-14 ENCOUNTER — TELEPHONE (OUTPATIENT)
Dept: CARDIOLOGY | Facility: CLINIC | Age: 76
End: 2021-12-14
Payer: MEDICARE

## 2021-12-17 DIAGNOSIS — E55.9 VITAMIN D INSUFFICIENCY: ICD-10-CM

## 2021-12-17 RX ORDER — LOSARTAN POTASSIUM 25 MG/1
25 TABLET ORAL DAILY
Qty: 30 TABLET | Refills: 1 | Status: SHIPPED | OUTPATIENT
Start: 2021-12-17 | End: 2022-03-03 | Stop reason: SDUPTHER

## 2021-12-20 RX ORDER — CHOLECALCIFEROL (VITAMIN D3) 25 MCG
TABLET ORAL
Qty: 180 TABLET | Refills: 0 | Status: SHIPPED | OUTPATIENT
Start: 2021-12-20 | End: 2023-08-22 | Stop reason: SDUPTHER

## 2022-01-04 ENCOUNTER — TELEPHONE (OUTPATIENT)
Dept: PULMONOLOGY | Facility: CLINIC | Age: 77
End: 2022-01-04
Payer: MEDICARE

## 2022-01-04 NOTE — TELEPHONE ENCOUNTER
----- Message from Mera Longo sent at 1/4/2022 10:59 AM CST -----  Regarding: Appointment Request  Regarding: pt called to schedule an appointment with doctor for a follow up         Call back number:[phone]

## 2022-01-04 NOTE — TELEPHONE ENCOUNTER
Mrs Barajas called and said she had to cancel her September visit with Dr Hernandez and was not able to reschedule until now. She said she is not short of breath and she only uses her oxygen when she feels she needs it. She prefers a morning appointment and I told her I will have to ask Dr Hernandez if he can see her in the AM and call her back. Denisse Lara LPN

## 2022-01-25 ENCOUNTER — OFFICE VISIT (OUTPATIENT)
Dept: CARDIOLOGY | Facility: CLINIC | Age: 77
End: 2022-01-25
Payer: MEDICARE

## 2022-01-25 VITALS
HEIGHT: 64 IN | DIASTOLIC BLOOD PRESSURE: 72 MMHG | HEART RATE: 74 BPM | SYSTOLIC BLOOD PRESSURE: 132 MMHG | BODY MASS INDEX: 28.79 KG/M2 | WEIGHT: 168.63 LBS | OXYGEN SATURATION: 92 %

## 2022-01-25 DIAGNOSIS — I87.2 VENOUS INSUFFICIENCY OF BOTH LOWER EXTREMITIES: Primary | ICD-10-CM

## 2022-01-25 DIAGNOSIS — N18.30 STAGE 3 CHRONIC KIDNEY DISEASE, UNSPECIFIED WHETHER STAGE 3A OR 3B CKD: ICD-10-CM

## 2022-01-25 DIAGNOSIS — I89.0 LYMPHEDEMA OF BOTH LOWER EXTREMITIES: ICD-10-CM

## 2022-01-25 DIAGNOSIS — I10 ESSENTIAL HYPERTENSION: ICD-10-CM

## 2022-01-25 DIAGNOSIS — I82.813 ACUTE SUPERFICIAL VENOUS THROMBOSIS OF LOWER EXTREMITY, BILATERAL: ICD-10-CM

## 2022-01-25 DIAGNOSIS — I83.893 VARICOSE VEINS OF BOTH LEGS WITH EDEMA: ICD-10-CM

## 2022-01-25 DIAGNOSIS — I48.0 PAROXYSMAL ATRIAL FIBRILLATION: ICD-10-CM

## 2022-01-25 DIAGNOSIS — E78.2 MIXED HYPERLIPIDEMIA: ICD-10-CM

## 2022-01-25 DIAGNOSIS — I87.2 VENOUS STASIS DERMATITIS OF BOTH LOWER EXTREMITIES: ICD-10-CM

## 2022-01-25 DIAGNOSIS — I70.0 AORTIC ATHEROSCLEROSIS: ICD-10-CM

## 2022-01-25 DIAGNOSIS — I50.30 HEART FAILURE WITH PRESERVED EJECTION FRACTION, UNSPECIFIED HF CHRONICITY: ICD-10-CM

## 2022-01-25 PROCEDURE — 99205 OFFICE O/P NEW HI 60 MIN: CPT | Mod: S$GLB,,, | Performed by: INTERNAL MEDICINE

## 2022-01-25 PROCEDURE — 1159F PR MEDICATION LIST DOCUMENTED IN MEDICAL RECORD: ICD-10-PCS | Mod: CPTII,S$GLB,, | Performed by: INTERNAL MEDICINE

## 2022-01-25 PROCEDURE — 1159F MED LIST DOCD IN RCRD: CPT | Mod: CPTII,S$GLB,, | Performed by: INTERNAL MEDICINE

## 2022-01-25 PROCEDURE — 3075F PR MOST RECENT SYSTOLIC BLOOD PRESS GE 130-139MM HG: ICD-10-PCS | Mod: CPTII,S$GLB,, | Performed by: INTERNAL MEDICINE

## 2022-01-25 PROCEDURE — 3288F PR FALLS RISK ASSESSMENT DOCUMENTED: ICD-10-PCS | Mod: CPTII,S$GLB,, | Performed by: INTERNAL MEDICINE

## 2022-01-25 PROCEDURE — 99999 PR PBB SHADOW E&M-EST. PATIENT-LVL V: ICD-10-PCS | Mod: PBBFAC,,, | Performed by: INTERNAL MEDICINE

## 2022-01-25 PROCEDURE — 3075F SYST BP GE 130 - 139MM HG: CPT | Mod: CPTII,S$GLB,, | Performed by: INTERNAL MEDICINE

## 2022-01-25 PROCEDURE — 1126F PR PAIN SEVERITY QUANTIFIED, NO PAIN PRESENT: ICD-10-PCS | Mod: CPTII,S$GLB,, | Performed by: INTERNAL MEDICINE

## 2022-01-25 PROCEDURE — 99999 PR PBB SHADOW E&M-EST. PATIENT-LVL V: CPT | Mod: PBBFAC,,, | Performed by: INTERNAL MEDICINE

## 2022-01-25 PROCEDURE — 1101F PT FALLS ASSESS-DOCD LE1/YR: CPT | Mod: CPTII,S$GLB,, | Performed by: INTERNAL MEDICINE

## 2022-01-25 PROCEDURE — 3288F FALL RISK ASSESSMENT DOCD: CPT | Mod: CPTII,S$GLB,, | Performed by: INTERNAL MEDICINE

## 2022-01-25 PROCEDURE — 1126F AMNT PAIN NOTED NONE PRSNT: CPT | Mod: CPTII,S$GLB,, | Performed by: INTERNAL MEDICINE

## 2022-01-25 PROCEDURE — 99205 PR OFFICE/OUTPT VISIT, NEW, LEVL V, 60-74 MIN: ICD-10-PCS | Mod: S$GLB,,, | Performed by: INTERNAL MEDICINE

## 2022-01-25 PROCEDURE — 1101F PR PT FALLS ASSESS DOC 0-1 FALLS W/OUT INJ PAST YR: ICD-10-PCS | Mod: CPTII,S$GLB,, | Performed by: INTERNAL MEDICINE

## 2022-01-25 PROCEDURE — 3078F DIAST BP <80 MM HG: CPT | Mod: CPTII,S$GLB,, | Performed by: INTERNAL MEDICINE

## 2022-01-25 PROCEDURE — 3078F PR MOST RECENT DIASTOLIC BLOOD PRESSURE < 80 MM HG: ICD-10-PCS | Mod: CPTII,S$GLB,, | Performed by: INTERNAL MEDICINE

## 2022-01-25 NOTE — PROGRESS NOTES
[facility]    CC: Varicose veins of both legs with edema      Patient ID: Misa Barajas is a 76 y.o. female with paroxysmal atrial fibrillation (on apixaban), HFpEF, HTN, HLD, status post left mastectomy for breast cancer, CKD 3, who presents for an initial appointment.  Pertinent history/events are as follows:     -Pt kindly referred by Dr. Bruno for evaluation of Varicose veins of both legs with edema.    HPI:  Mrs. Barajas reports states leg swelling started approximateley 1 year ago.  She has no claudication or tissue loss.  Exam shows BLE's with 1 pitting edema, venous stasis dermatitis and changes consistent with lymphedema (L>R).  BLE Venous Reflux Study on 12/10/2021 revealed superficial venous thrombosis (SVT) of the right greater saphenous vein (multiple varices below knee with no evidence of propagation into the right common femoral vein) and left greater saphenous vein (multiple varices below knee with no evidence of propagation into the left common femoral vein).  The bilateral common femoral and greater saphenous veins have reflux.  Carotid Ultrasound on 12/9/2021 revealed bilateral mild (less than 50%) ICA stenosis and antegrade vertebral artery flow bilaterally.     Past Medical History:   Diagnosis Date    Acute hypoxemic respiratory failure 6/26/2021    Aortic atherosclerosis     Arthritis     knee joint pain    Breast cancer 2002    left breast & lymph nodes-s/p sx with chemo    Bronchitis     with flu-2/2014    Cataracts, bilateral     Chronic diastolic heart failure 12/24/2019    Chronic kidney disease, stage 3     History of chemotherapy     last treatment 12/2002 (had 8 treatments)    Hyperlipidemia 11/20/2016    Hypertension     Osteoporosis     Paroxysmal atrial fibrillation 6/7/2021    Renal calculi     hematuria    Vitamin D insufficiency      Past Surgical History:   Procedure Laterality Date    BREAST BIOPSY Left 2002    core bx, +    BREAST BIOPSY  Right 2018    core    BREAST BIOPSY Right 2019    BREAST LUMPECTOMY Left     LITHOTRIPSY      MASTECTOMY Left 2002    left-& lymph node dissection    ROBOT-ASSISTED LAPAROSCOPIC PARTIAL NEPHRECTOMY USING DA JESÚS XI Right 3/11/2021    Procedure: XI ROBOTIC NEPHRECTOMY, PARTIAL;  Surgeon: Taz Ortega MD;  Location: [facility] OR [address] [place] [address];  Service: Urology;  Laterality: Right;  4hr/ gen with regional  [place] confirmation:  [id number] for 11:15am case NC    ureteroscopy Bilateral     6.14     Social History     Socioeconomic History    Marital status:    Tobacco Use    Smoking status: Former Smoker     Packs/day: 1.00     Years: 50.00     Pack years: 50.00     Types: Cigarettes     Start date:      Quit date: 2009     Years since quittin.6    Smokeless tobacco: Never Used   Substance and Sexual Activity    Alcohol use: No    Drug use: No    Sexual activity: Not Currently     Partners: Male     Birth control/protection: Partner-Vasectomy     Social Determinants of Health     Financial Resource Strain: Low Risk     Difficulty of Paying Living Expenses: Not hard at all   Food Insecurity: No Food Insecurity    Worried About Running Out of Food in the Last Year: Never true    Ran Out of Food in the Last Year: Never true   Transportation Needs: No Transportation Needs    Lack of Transportation (Medical): No    Lack of Transportation (Non-Medical): No   Physical Activity: Insufficiently Active    Days of Exercise per Week: 3 days    Minutes of Exercise per Session: 20 min   Stress: Stress Concern Present    Feeling of Stress : To some extent   Social Connections: Socially Isolated    Frequency of Communication with Friends and Family: More than three times a week    Frequency of Social Gatherings with Friends and Family: More than three times a week    Attends Mormonism Services: Never    Active Member of Clubs or Organizations: No    Attends Club or Organization Meetings:  Never    Marital Status:    Housing Stability: Low Risk     Unable to Pay for Housing in the Last Year: No    Number of Places Lived in the Last Year: 1    Unstable Housing in the Last Year: No     Family History   Problem Relation Age of Onset    Bone cancer Mother     Breast cancer Mother     Arthritis Mother         Breast Cancer    Breast cancer Sister     Cancer Father         Asbestos cancer    No Known Problems Brother     Fibroids Daughter     Crohn's disease Daughter     No Known Problems Daughter     Arthritis Sister         Breast Cancer    Anesthesia problems Neg Hx     Glaucoma Neg Hx        Review of patient's allergies indicates:   Allergen Reactions    Adhesive Rash     Pt states she removed a LATEX bandaid and the skin beneath was swollen and red. No other latex causes a reaction.       Medication List with Changes/Refills   Current Medications    ACETAMINOPHEN (TYLENOL) 500 MG TABLET    Take 2 tablets (1,000 mg total) by mouth every 8 (eight) hours as needed for Pain.    ALBUTEROL (ACCUNEB) 0.63 MG/3 ML NEBU    Take 3 mLs (0.63 mg total) by nebulization every 6 (six) hours as needed. Rescue    APIXABAN (ELIQUIS) 5 MG TAB    Take 5 mg by mouth once daily.    ASPIRIN (ECOTRIN) 81 MG EC TABLET    Take 81 mg by mouth once daily.    BIOTIN 1 MG TABLET    Take 1,000 mcg by mouth 3 (three) times daily.    BUDESONIDE-GLYCOPYR-FORMOTEROL (BREZTRI AEROSPHERE) 160-9-4.8 MCG/ACTUATION HFAA    Inhale 2 puffs into the lungs 2 (two) times daily.    FISH OIL-OMEGA-3 FATTY ACIDS 300-1,000 MG CAPSULE    Take 2 g by mouth once daily.    FUROSEMIDE (LASIX) 20 MG TABLET    Take 1 tablet by mouth daily If weight gain 2 to3 lbs for 2 to 5 days. If no improvement, call MD    INHALATION SPACING DEVICE    Use as directed for inhalation.    LOSARTAN (COZAAR) 25 MG TABLET    Take 1 tablet (25 mg total) by mouth once daily.    METOPROLOL SUCCINATE (TOPROL-XL) 100 MG 24 HR TABLET    Take 1 tablet (100 mg  "total) by mouth once daily.    ONDANSETRON (ZOFRAN) 4 MG TABLET    Take 1 tablet (4 mg total) by mouth every 8 (eight) hours as needed for Nausea.    VITAMIN D (VITAMIN D3) 1000 UNITS TAB    TAKE 2 TABLETS ONE TIME DAILY   Discontinued Medications    ROSUVASTATIN (CRESTOR) 5 MG TABLET    TAKE 1 TABLET EVERY DAY       Review of Systems  Constitution: Denies chills, fever, and sweats.  HENT: Denies headaches or blurry vision.  Cardiovascular: Denies chest pain or irregular heart beat.  Respiratory: Denies cough or shortness of breath.  Gastrointestinal: Denies abdominal pain, nausea, or vomiting.  Musculoskeletal: Positive for leg swelling.  Neurological: Denies dizziness or focal weakness.  Psychiatric/Behavioral: Normal mental status.  Hematologic/Lymphatic: Denies bleeding problem or easy bruising/bleeding.  Skin: Denies rash or suspicious lesions    Physical Examination  /72   Pulse 74   Ht 5' 4" (1.626 m)   Wt 76.5 kg (168 lb 10.4 oz)   SpO2 (!) 92%   BMI 28.95 kg/m²     Constitutional: No acute distress, conversant  HEENT: Sclera anicteric, Pupils equal, round and reactive to light, extraocular motions intact, Oropharynx clear  Neck: No JVD, no carotid bruits  Cardiovascular: regular rate and rhythm, no murmur, rubs or gallops, normal S1/S2  Pulmonary: Clear to auscultation bilaterally  Abdominal: Abdomen soft, nontender, nondistended, positive bowel sounds  Extremities: BLE's with 1 pitting edema, venous stasis dermatitis and changes consistent with lymphedema (L>R).   Pulses:  Carotid pulses are 2+ on the right side, and 2+ on the left side.  Radial pulses are 2+ on the right side, and 2+ on the left side.   Femoral pulses are 2+ on the right side, and 2+ on the left side.  Popliteal pulses are 2+ on the right side, and 2+ on the left side.   Dorsalis pedis pulses are 2+ on the right side, and 2+ on the left side.   Posterior tibial pulses are 2+ on the right side, and 2+ on the left side.    Skin: " No ecchymosis, erythema, or ulcers  Psych: Alert and oriented x 3, appropriate affect  Neuro: CNII-XII intact, no focal deficits    Labs:  Most Recent Data  CBC:   Lab Results   Component Value Date    WBC 9.74 06/29/2021    HGB 13.2 06/29/2021    HCT 41.0 06/29/2021     06/29/2021    MCV 91 06/29/2021    RDW 13.0 06/29/2021     BMP:   Lab Results   Component Value Date     11/12/2021    K 4.0 11/12/2021     11/12/2021    CO2 27 11/12/2021    BUN 22 11/12/2021    CREATININE 1.2 11/12/2021    GLU 80 11/12/2021    CALCIUM 10.5 11/12/2021    MG 2.1 06/28/2021    PHOS 3.6 05/14/2021     LFTS;   Lab Results   Component Value Date    PROT 6.5 11/12/2021    ALBUMIN 3.3 (L) 11/12/2021    BILITOT 0.9 11/12/2021    AST 34 11/12/2021    ALKPHOS 80 11/12/2021    ALT 26 11/12/2021     COAGS:   Lab Results   Component Value Date    INR 0.9 08/09/2010     FLP:   Lab Results   Component Value Date    CHOL 140 06/14/2021    HDL 65 06/14/2021    LDLCALC 49.4 (L) 06/14/2021    TRIG 128 06/14/2021    CHOLHDL 46.4 06/14/2021     CARDIAC:   Lab Results   Component Value Date    TROPONINI 0.009 06/25/2021     (H) 06/25/2021       Imaging:    BLE Venous Reflux Study 12/10/2021:  · No evidence of a right lower extremity DVT.  · There is superficial venous thrombosis (SVT) of the right greater saphenous vein (multiple varices below knee with no evidence of propagation into the right common femoral vein).  · The right common femoral vein has reflux.  · The right greater saphenous vein has reflux.  · A Baker's cyst measuring 3.1 cm x 1.3 cm is visualized in the right lower extremity.  · No evidence of a left lower extremity DVT.  · There is superficial venous thrombosis (SVT) of the left greater saphenous vein (multiple varices below knee with no evidence of propagation into the left common femoral vein).  · The left common femoral vein has reflux.  · The left greater saphenous vein has reflux.    Carotid Ultrasound  12/9/2021:  Bilateral carotid atherosclerosis is present.  Bilateral mild (less than 50%) ICA stenosis.  Vertebral flow is antegrade bilaterally.    Echo 6/9/2021:  · The left ventricle is normal in size with normal systolic function. The estimated ejection fraction is 65%.  · Indeterminate left ventricular diastolic function.  · Normal right ventricular size with normal right ventricular systolic function.  · Mild mitral regurgitation.  · Mild tricuspid regurgitation.  · The estimated PA systolic pressure is 38 mmHg.  · Normal central venous pressure (3 mmHg).  ·     Assessment/Plan:   Misa Barajas is a 76 y.o. female with paroxysmal atrial fibrillation (on apixaban), HFpEF, HTN, HLD, status post left mastectomy for breast cancer, CKD 3, who presents for an initial appointment.     1. Varicose veins of both legs with edema- Due to venous insufficiency and lymphedema.  Refer to lymphedema clinic.  Increase lasix to 20 mg bid.  Pt to limit sodium intake to 2,000 mg daily.  Elevate legs when resting.  Check cmp in 1 week.     2. Paroxysmal atrial fibrillation- Continue current medications, including apixaban for anticoagulation.  Pt states she takes daily instead of bid because of bleeding issues.      3. HTN- Continue current medications.    4. HFpEF- Stable.  Continue to monitor.     5. HLD- Continue current medications.    Follow up in 2 months    Total duration of face to face visit time 30 minutes.  Total time spent counseling greater than fifty percent of total visit time.  Counseling included discussion regarding imaging findings, diagnosis, possibilities, treatment options, risks and benefits.  The patient had many questions regarding the options and long-term effects.    Talha Grant MD, PhD  Interventional Cardiology

## 2022-01-25 NOTE — PATIENT INSTRUCTIONS
Assessment/Plan:   Misa Barajas is a 76 y.o. female with paroxysmal atrial fibrillation (on apixaban), HFpEF, HTN, HLD, status post left mastectomy for breast cancer, CKD 3, who presents for an initial appointment.     1. Varicose veins of both legs with edema- Due to venous insufficiency and lymphedema.  Refer to lymphedema clinic.  Increase lasix to 20 mg bid.  Pt to limit sodium intake to 2,000 mg daily.  Elevate legs when resting.  Check cmp in 1 week.     2. Paroxysmal atrial fibrillation- Continue current medications, including apixaban for anticoagulation.  Pt states she takes daily instead of bid because of bleeding issues.      3. HTN- Continue current medications.    4. HFpEF- Stable.  Continue to monitor.     5. HLD- Continue current medications.    Follow up in 2 months

## 2022-02-01 ENCOUNTER — HOSPITAL ENCOUNTER (OUTPATIENT)
Dept: CARDIOLOGY | Facility: HOSPITAL | Age: 77
Discharge: HOME | End: 2022-02-01
Attending: INTERNAL MEDICINE
Payer: MEDICARE

## 2022-02-01 DIAGNOSIS — I89.0 LYMPHEDEMA OF BOTH LOWER EXTREMITIES: ICD-10-CM

## 2022-02-01 LAB
IMMEDIATE ARM BP: 161 MMHG
IMMEDIATE LEFT ABI: 0.84
IMMEDIATE LEFT TIBIAL: 136 MMHG
IMMEDIATE RIGHT ABI: 0.96
IMMEDIATE RIGHT TIBIAL: 155 MMHG
LEFT ABI: 0.88
LEFT DORSALIS PEDIS: 151 MMHG
LEFT POSTERIOR TIBIAL: 151 MMHG
RIGHT ABI: 1.02
RIGHT ARM BP: 171 MMHG
RIGHT DORSALIS PEDIS: 175 MMHG
RIGHT POSTERIOR TIBIAL: 175 MMHG
TREADMILL GRADE: 12 %
TREADMILL SPEED: 2 MPH
TREADMILL TIME: 2 MIN

## 2022-02-01 PROCEDURE — 93924 LWR XTR VASC STDY BILAT: CPT | Mod: 26,,, | Performed by: INTERNAL MEDICINE

## 2022-02-01 PROCEDURE — 93924 LWR XTR VASC STDY BILAT: CPT

## 2022-02-01 PROCEDURE — 93924 ANKLE BRACHIAL INDICES (ABI): ICD-10-PCS | Mod: 26,,, | Performed by: INTERNAL MEDICINE

## 2022-02-23 ENCOUNTER — PATIENT OUTREACH (OUTPATIENT)
Dept: ADMINISTRATIVE | Facility: OTHER | Age: 77
End: 2022-02-23
Payer: MEDICARE

## 2022-02-23 NOTE — PROGRESS NOTES
Health Maintenance Due   Topic Date Due    DEXA Scan  08/30/2021    Mammogram  09/15/2021     Updates were requested from care everywhere.  Chart was reviewed for overdue Proactive [facility] Encounters (AURELIA) topics (CRS, Breast Cancer Screening, Eye exam)  Health Maintenance has been updated.  LINKS immunization registry triggered.  Immunizations were reconciled.       Tele monitoring  MRI brain ,MRA brain MRA neck, TTE pending, cardio following  Lipid profile noted, A1C pending  Continue Pradaxa, ASA  PT/OT: home with home PT  Cineesophagogram results pending, swallow therapy, assist with feeds  dysphagia diet  C/w Livalo at home (ordered as nonformulary)

## 2022-02-24 ENCOUNTER — TELEPHONE (OUTPATIENT)
Dept: PULMONOLOGY | Facility: CLINIC | Age: 77
End: 2022-02-24
Payer: MEDICARE

## 2022-02-24 ENCOUNTER — HOSPITAL ENCOUNTER (OUTPATIENT)
Dept: RADIOLOGY | Facility: HOSPITAL | Age: 77
Discharge: HOME | End: 2022-02-24
Attending: INTERNAL MEDICINE
Payer: MEDICARE

## 2022-02-24 ENCOUNTER — OFFICE VISIT (OUTPATIENT)
Dept: PULMONOLOGY | Facility: CLINIC | Age: 77
End: 2022-02-24
Payer: MEDICARE

## 2022-02-24 VITALS
OXYGEN SATURATION: 93 % | DIASTOLIC BLOOD PRESSURE: 76 MMHG | WEIGHT: 167.75 LBS | BODY MASS INDEX: 29.72 KG/M2 | HEIGHT: 63 IN | SYSTOLIC BLOOD PRESSURE: 128 MMHG | HEART RATE: 71 BPM

## 2022-02-24 DIAGNOSIS — I50.9 CONGESTIVE HEART FAILURE, NYHA CLASS 1, UNSPECIFIED CONGESTIVE HEART FAILURE TYPE: ICD-10-CM

## 2022-02-24 DIAGNOSIS — I50.9 CONGESTIVE HEART FAILURE, NYHA CLASS 1, UNSPECIFIED CONGESTIVE HEART FAILURE TYPE: Primary | ICD-10-CM

## 2022-02-24 PROCEDURE — 3074F SYST BP LT 130 MM HG: CPT | Mod: CPTII,S$GLB,, | Performed by: INTERNAL MEDICINE

## 2022-02-24 PROCEDURE — 99999 PR PBB SHADOW E&M-EST. PATIENT-LVL III: ICD-10-PCS | Mod: PBBFAC,,, | Performed by: INTERNAL MEDICINE

## 2022-02-24 PROCEDURE — 99214 OFFICE O/P EST MOD 30 MIN: CPT | Mod: S$GLB,,, | Performed by: INTERNAL MEDICINE

## 2022-02-24 PROCEDURE — 3078F DIAST BP <80 MM HG: CPT | Mod: CPTII,S$GLB,, | Performed by: INTERNAL MEDICINE

## 2022-02-24 PROCEDURE — 71046 XR CHEST PA AND LATERAL: ICD-10-PCS | Mod: 26,,, | Performed by: RADIOLOGY

## 2022-02-24 PROCEDURE — 3074F PR MOST RECENT SYSTOLIC BLOOD PRESSURE < 130 MM HG: ICD-10-PCS | Mod: CPTII,S$GLB,, | Performed by: INTERNAL MEDICINE

## 2022-02-24 PROCEDURE — 71046 X-RAY EXAM CHEST 2 VIEWS: CPT | Mod: 26,,, | Performed by: RADIOLOGY

## 2022-02-24 PROCEDURE — 3078F PR MOST RECENT DIASTOLIC BLOOD PRESSURE < 80 MM HG: ICD-10-PCS | Mod: CPTII,S$GLB,, | Performed by: INTERNAL MEDICINE

## 2022-02-24 PROCEDURE — 99499 UNLISTED E&M SERVICE: CPT | Mod: S$PBB,,, | Performed by: INTERNAL MEDICINE

## 2022-02-24 PROCEDURE — 99499 RISK ADDL DX/OHS AUDIT: ICD-10-PCS | Mod: S$PBB,,, | Performed by: INTERNAL MEDICINE

## 2022-02-24 PROCEDURE — 99999 PR PBB SHADOW E&M-EST. PATIENT-LVL III: CPT | Mod: PBBFAC,,, | Performed by: INTERNAL MEDICINE

## 2022-02-24 PROCEDURE — 1101F PT FALLS ASSESS-DOCD LE1/YR: CPT | Mod: CPTII,S$GLB,, | Performed by: INTERNAL MEDICINE

## 2022-02-24 PROCEDURE — 99214 PR OFFICE/OUTPT VISIT, EST, LEVL IV, 30-39 MIN: ICD-10-PCS | Mod: S$GLB,,, | Performed by: INTERNAL MEDICINE

## 2022-02-24 PROCEDURE — 3288F FALL RISK ASSESSMENT DOCD: CPT | Mod: CPTII,S$GLB,, | Performed by: INTERNAL MEDICINE

## 2022-02-24 PROCEDURE — 1126F PR PAIN SEVERITY QUANTIFIED, NO PAIN PRESENT: ICD-10-PCS | Mod: CPTII,S$GLB,, | Performed by: INTERNAL MEDICINE

## 2022-02-24 PROCEDURE — 1126F AMNT PAIN NOTED NONE PRSNT: CPT | Mod: CPTII,S$GLB,, | Performed by: INTERNAL MEDICINE

## 2022-02-24 PROCEDURE — 3288F PR FALLS RISK ASSESSMENT DOCUMENTED: ICD-10-PCS | Mod: CPTII,S$GLB,, | Performed by: INTERNAL MEDICINE

## 2022-02-24 PROCEDURE — 1101F PR PT FALLS ASSESS DOC 0-1 FALLS W/OUT INJ PAST YR: ICD-10-PCS | Mod: CPTII,S$GLB,, | Performed by: INTERNAL MEDICINE

## 2022-02-24 PROCEDURE — 71046 X-RAY EXAM CHEST 2 VIEWS: CPT | Mod: TC,FY

## 2022-02-24 NOTE — PROGRESS NOTES
Subjective:      Patient ID: Misa Barajas is a 76 y.o. female.    Chief Complaint: COPD    HPI   Six month follow up on COPD. She states that is feeling and breathing better. She has no cliniclal evidence of CHF.  Her Chest x-ray today is clear and peak flow is 200 lmin  Is well controlled on  Her Brfeztsi Inhaler ? ID   No flowsheet data found.  Review of Systems   Constitutional: Negative.    HENT: Negative.    Eyes: Negative.    Respiratory: Positive for dyspnea on extertion.         Moderate but stable COPD   Cardiovascular: Negative.         Hx of diastolic  CHF,  In balance now   Genitourinary: Negative.    Musculoskeletal: Negative.    Skin: Negative.    Gastrointestinal: Negative.    Neurological: Negative.    Psychiatric/Behavioral: Negative.      Objective:     Physical Exam  Vitals and nursing note reviewed.   Constitutional:       General: She is not in acute distress.     Appearance: She is well-developed.   HENT:      Head: Normocephalic and atraumatic.      Right Ear: External ear normal.      Left Ear: External ear normal.      Nose: Nose normal.   Eyes:      Conjunctiva/sclera: Conjunctivae normal.      Pupils: Pupils are equal, round, and reactive to light.   Neck:      Thyroid: No thyromegaly.      Vascular: No JVD.   Cardiovascular:      Rate and Rhythm: Normal rate and regular rhythm.      Heart sounds: Normal heart sounds. No murmur heard.    No gallop.   Pulmonary:      Effort: No respiratory distress.      Breath sounds: Normal breath sounds. No stridor. No wheezing or rales.      Comments: Clear to A&P     SA02:94%        Peak flow 200 l/min      Chest x-ray clear   Chest:      Chest wall: No tenderness.   Abdominal:      General: Bowel sounds are normal. There is no distension.      Palpations: Abdomen is soft. There is no mass.      Tenderness: There is no abdominal tenderness. There is no guarding or rebound.   Musculoskeletal:         General: Normal range of motion.      Cervical  back: Normal range of motion and neck supple.   Lymphadenopathy:      Cervical: No cervical adenopathy.   Skin:     General: Skin is warm and dry.      Findings: No rash.   Neurological:      Mental Status: She is alert and oriented to person, place, and time.      Cranial Nerves: No cranial nerve deficit.      Deep Tendon Reflexes: Reflexes are normal and symmetric. Reflexes normal.   Psychiatric:         Behavior: Behavior normal.         Thought Content: Thought content normal.         Judgment: Judgment normal.         Assessment:     1. Congestive heart failure, NYHA class 1, unspecified congestive heart failure type      Outpatient Encounter Medications as of 2/24/2022   Medication Sig Dispense Refill    acetaminophen (TYLENOL) 500 MG tablet Take 2 tablets (1,000 mg total) by mouth every 8 (eight) hours as needed for Pain. 42 tablet 0    albuterol (ACCUNEB) 0.63 mg/3 mL Nebu Take 3 mLs (0.63 mg total) by nebulization every 6 (six) hours as needed. Rescue 75 mL 0    apixaban (ELIQUIS) 5 mg Tab Take 5 mg by mouth once daily.      aspirin (ECOTRIN) 81 MG EC tablet Take 81 mg by mouth once daily.      biotin 1 mg tablet Take 1,000 mcg by mouth 3 (three) times daily.      budesonide-glycopyr-formoterol (BREZTRI AEROSPHERE) 160-9-4.8 mcg/actuation HFAA Inhale 2 puffs into the lungs 2 (two) times daily. 32.1 g 3    fish oil-omega-3 fatty acids 300-1,000 mg capsule Take 2 g by mouth once daily.      furosemide (LASIX) 20 MG tablet Take 1 tablet by mouth daily If weight gain 2 to3 lbs for 2 to 5 days. If no improvement, call MD (Patient taking differently: Take 1 tablet by mouth daily If weight gain 2 to3 lbs for 2 to 5 days. If no improvement, call MD) 30 tablet 6    inhalation spacing device Use as directed for inhalation. 1 Device 0    losartan (COZAAR) 25 MG tablet Take 1 tablet (25 mg total) by mouth once daily. 30 tablet 1    metoprolol succinate (TOPROL-XL) 100 MG 24 hr tablet Take 1 tablet (100 mg  total) by mouth once daily. 30 tablet 6    ondansetron (ZOFRAN) 4 MG tablet Take 1 tablet (4 mg total) by mouth every 8 (eight) hours as needed for Nausea. 15 tablet 0    vitamin D (VITAMIN D3) 1000 units Tab TAKE 2 TABLETS ONE TIME DAILY 180 tablet 0     No facility-administered encounter medications on file as of 2/24/2022.       Plan:   Stable COPD  doing well on Breztri  BID  Problem List Items Addressed This Visit    None     Visit Diagnoses     Congestive heart failure, NYHA class 1, unspecified congestive heart failure type    -  Primary    Relevant Orders    X-Ray Chest PA And Lateral (Completed)           Debbie Laurent

## 2022-03-03 RX ORDER — LOSARTAN POTASSIUM 25 MG/1
25 TABLET ORAL DAILY
Qty: 30 TABLET | Refills: 1 | Status: SHIPPED | OUTPATIENT
Start: 2022-03-03 | End: 2022-05-11 | Stop reason: SDUPTHER

## 2022-03-03 NOTE — TELEPHONE ENCOUNTER
No new care gaps identified.  Powered by [employer]. Reference number: [id number].   3/03/2022 11:19:39 AM CST

## 2022-03-03 NOTE — TELEPHONE ENCOUNTER
----- Message from Patricia Cuellar sent at 3/3/2022  9:59 AM CST -----  Regarding: Refill  Type:  RX Refill Request    Who Called: pt    Refill or New Rx: refill    RX Name and Strength: losartan (COZAAR) 25 MG tablet    Preferred Pharmacy with phone number: [facility] [phone]    Would the patient rather a call back or a response via [facility]? Call    Best Call Back Number: [phone]

## 2022-03-15 ENCOUNTER — TELEPHONE (OUTPATIENT)
Dept: INTERNAL MEDICINE | Facility: CLINIC | Age: 77
End: 2022-03-15
Payer: MEDICARE

## 2022-03-15 NOTE — TELEPHONE ENCOUNTER
----- Message from Christy Michelle sent at 3/15/2022  8:21 AM CDT -----  Type:  Needs Medical Advice    Who Called: self  Reason:refill on Rouvasatin   Would the patient rather a call back or a response via [facility]? call  Best Call Back Number: [phone]  Additional Information: [facility] - [facility] [address]   Phone:  [phone]  Fax:  [phone]

## 2022-03-18 DIAGNOSIS — E78.5 HYPERLIPIDEMIA, UNSPECIFIED HYPERLIPIDEMIA TYPE: ICD-10-CM

## 2022-03-18 DIAGNOSIS — E78.5 HYPERLIPIDEMIA, UNSPECIFIED HYPERLIPIDEMIA TYPE: Primary | ICD-10-CM

## 2022-03-18 RX ORDER — ROSUVASTATIN CALCIUM 5 MG/1
5 TABLET, COATED ORAL DAILY
Qty: 90 TABLET | Refills: 3 | Status: SHIPPED | OUTPATIENT
Start: 2022-03-18 | End: 2022-03-18 | Stop reason: SDUPTHER

## 2022-03-18 NOTE — TELEPHONE ENCOUNTER
----- Message from Karey Raymond sent at 3/18/2022  8:46 AM CDT -----  Needs advice from nurse:      Who Called:pt  Regarding:would like to know why you will not refill patient's Rouvastatin 5mg  Would the patient rather a call back or [facility]IA [facility]?  Best Call Back number:[phone]  Additional Info:

## 2022-03-18 NOTE — TELEPHONE ENCOUNTER
Care Due:                  Date            Visit Type   Department     Provider  --------------------------------------------------------------------------------                                EP -                              PRIMARY      [facility] INTERNAL  Last Visit: 07-      CARE ([facility])   MEDICINE       Celia Carlisle  Next Visit: None Scheduled  None         None Found                                                            Last  Test          Frequency    Reason                     Performed    Due Date  --------------------------------------------------------------------------------    Lipid Panel.  12 months..  rosuvastatin.............  06-   06-    Powered by [employer] by [employer]. Reference number: [id number].   3/18/2022 3:02:03 PM CDT

## 2022-03-21 DIAGNOSIS — E78.5 HYPERLIPIDEMIA, UNSPECIFIED HYPERLIPIDEMIA TYPE: ICD-10-CM

## 2022-03-21 RX ORDER — ROSUVASTATIN CALCIUM 5 MG/1
5 TABLET, COATED ORAL DAILY
Qty: 90 TABLET | Refills: 3 | Status: ON HOLD | OUTPATIENT
Start: 2022-03-21 | End: 2022-04-28

## 2022-03-21 RX ORDER — ROSUVASTATIN CALCIUM 5 MG/1
5 TABLET, COATED ORAL DAILY
Qty: 30 TABLET | Refills: 0 | Status: SHIPPED | OUTPATIENT
Start: 2022-03-21 | End: 2022-08-01 | Stop reason: SDUPTHER

## 2022-03-21 NOTE — TELEPHONE ENCOUNTER
No new care gaps identified.  Powered by [employer]. Reference number: [id number].   3/21/2022 8:14:57 AM CDT

## 2022-04-28 ENCOUNTER — HOSPITAL ENCOUNTER (INPATIENT)
Facility: HOSPITAL | Age: 77
LOS: 8 days | Discharge: HOME HEALTH | DRG: 660 | End: 2022-05-06
Attending: EMERGENCY MEDICINE | Admitting: FAMILY MEDICINE
Payer: MEDICARE

## 2022-04-28 ENCOUNTER — ANESTHESIA (OUTPATIENT)
Dept: SURGERY | Facility: HOSPITAL | Age: 77
DRG: 660 | End: 2022-04-28
Payer: MEDICARE

## 2022-04-28 ENCOUNTER — ANESTHESIA EVENT (OUTPATIENT)
Dept: SURGERY | Facility: HOSPITAL | Age: 77
DRG: 660 | End: 2022-04-28
Payer: MEDICARE

## 2022-04-28 DIAGNOSIS — N20.1 URETERAL STONE: ICD-10-CM

## 2022-04-28 DIAGNOSIS — N39.0 URINARY TRACT INFECTION WITH HEMATURIA, SITE UNSPECIFIED: ICD-10-CM

## 2022-04-28 DIAGNOSIS — I48.0 PAROXYSMAL ATRIAL FIBRILLATION: ICD-10-CM

## 2022-04-28 DIAGNOSIS — N13.30 HYDROURETERONEPHROSIS: ICD-10-CM

## 2022-04-28 DIAGNOSIS — J96.11 CHRONIC RESPIRATORY FAILURE WITH HYPOXIA: ICD-10-CM

## 2022-04-28 DIAGNOSIS — I10 ESSENTIAL HYPERTENSION: ICD-10-CM

## 2022-04-28 DIAGNOSIS — N17.9 ACUTE RENAL FAILURE SUPERIMPOSED ON STAGE 3A CHRONIC KIDNEY DISEASE, UNSPECIFIED ACUTE RENAL FAILURE TYPE: ICD-10-CM

## 2022-04-28 DIAGNOSIS — B49 FUNGEMIA: ICD-10-CM

## 2022-04-28 DIAGNOSIS — J43.2 CENTRILOBULAR EMPHYSEMA: ICD-10-CM

## 2022-04-28 DIAGNOSIS — N10 ACUTE PYELONEPHRITIS: ICD-10-CM

## 2022-04-28 DIAGNOSIS — N18.31 ACUTE RENAL FAILURE SUPERIMPOSED ON STAGE 3A CHRONIC KIDNEY DISEASE, UNSPECIFIED ACUTE RENAL FAILURE TYPE: ICD-10-CM

## 2022-04-28 DIAGNOSIS — Z79.01 CHRONIC ANTICOAGULATION: ICD-10-CM

## 2022-04-28 DIAGNOSIS — B37.7 CANDIDEMIA: ICD-10-CM

## 2022-04-28 DIAGNOSIS — R50.9 FEVER: ICD-10-CM

## 2022-04-28 DIAGNOSIS — R07.9 CHEST PAIN: ICD-10-CM

## 2022-04-28 DIAGNOSIS — N17.9 AKI (ACUTE KIDNEY INJURY): ICD-10-CM

## 2022-04-28 DIAGNOSIS — I50.32 CHRONIC DIASTOLIC HEART FAILURE: ICD-10-CM

## 2022-04-28 DIAGNOSIS — N20.0 NEPHROLITHIASIS: ICD-10-CM

## 2022-04-28 DIAGNOSIS — R31.9 URINARY TRACT INFECTION WITH HEMATURIA, SITE UNSPECIFIED: ICD-10-CM

## 2022-04-28 DIAGNOSIS — N20.1 RIGHT URETERAL STONE: Primary | ICD-10-CM

## 2022-04-28 LAB
ALBUMIN SERPL BCP-MCNC: 3.2 G/DL (ref 3.5–5.2)
ALP SERPL-CCNC: 83 U/L (ref 55–135)
ALT SERPL W/O P-5'-P-CCNC: 26 U/L (ref 10–44)
ANION GAP SERPL CALC-SCNC: 11 MMOL/L (ref 8–16)
AST SERPL-CCNC: 46 U/L (ref 10–40)
BACTERIA #/AREA URNS AUTO: ABNORMAL /HPF
BASOPHILS # BLD AUTO: 0.05 K/UL (ref 0–0.2)
BASOPHILS NFR BLD: 0.6 % (ref 0–1.9)
BILIRUB SERPL-MCNC: 2 MG/DL (ref 0.1–1)
BILIRUB UR QL STRIP: NEGATIVE
BUN SERPL-MCNC: 26 MG/DL (ref 8–23)
CALCIUM SERPL-MCNC: 10.1 MG/DL (ref 8.7–10.5)
CHLORIDE SERPL-SCNC: 100 MMOL/L (ref 95–110)
CLARITY UR REFRACT.AUTO: ABNORMAL
CO2 SERPL-SCNC: 25 MMOL/L (ref 23–29)
COLOR UR AUTO: ABNORMAL
CREAT SERPL-MCNC: 2 MG/DL (ref 0.5–1.4)
CTP QC/QA: YES
DIFFERENTIAL METHOD: ABNORMAL
EOSINOPHIL # BLD AUTO: 0.1 K/UL (ref 0–0.5)
EOSINOPHIL NFR BLD: 0.9 % (ref 0–8)
ERYTHROCYTE [DISTWIDTH] IN BLOOD BY AUTOMATED COUNT: 13.7 % (ref 11.5–14.5)
EST. GFR  (AFRICAN AMERICAN): 27 ML/MIN/1.73 M^2
EST. GFR  (NON AFRICAN AMERICAN): 23.4 ML/MIN/1.73 M^2
GLUCOSE SERPL-MCNC: 124 MG/DL (ref 70–110)
GLUCOSE UR QL STRIP: NEGATIVE
HCT VFR BLD AUTO: 45.8 % (ref 37–48.5)
HGB BLD-MCNC: 15.1 G/DL (ref 12–16)
HGB UR QL STRIP: ABNORMAL
HYALINE CASTS UR QL AUTO: 0 /LPF
IMM GRANULOCYTES # BLD AUTO: 0.02 K/UL (ref 0–0.04)
IMM GRANULOCYTES NFR BLD AUTO: 0.3 % (ref 0–0.5)
KETONES UR QL STRIP: NEGATIVE
LACTATE SERPL-SCNC: 1.2 MMOL/L (ref 0.5–2.2)
LEUKOCYTE ESTERASE UR QL STRIP: ABNORMAL
LYMPHOCYTES # BLD AUTO: 0.7 K/UL (ref 1–4.8)
LYMPHOCYTES NFR BLD: 9 % (ref 18–48)
MAGNESIUM SERPL-MCNC: 2.1 MG/DL (ref 1.6–2.6)
MCH RBC QN AUTO: 30.3 PG (ref 27–31)
MCHC RBC AUTO-ENTMCNC: 33 G/DL (ref 32–36)
MCV RBC AUTO: 92 FL (ref 82–98)
MICROSCOPIC COMMENT: ABNORMAL
MONOCYTES # BLD AUTO: 0.8 K/UL (ref 0.3–1)
MONOCYTES NFR BLD: 10.5 % (ref 4–15)
NEUTROPHILS # BLD AUTO: 6.1 K/UL (ref 1.8–7.7)
NEUTROPHILS NFR BLD: 78.7 % (ref 38–73)
NITRITE UR QL STRIP: NEGATIVE
NRBC BLD-RTO: 0 /100 WBC
PH UR STRIP: 7 [PH] (ref 5–8)
PLATELET # BLD AUTO: 164 K/UL (ref 150–450)
PMV BLD AUTO: 10.3 FL (ref 9.2–12.9)
POTASSIUM SERPL-SCNC: 4.8 MMOL/L (ref 3.5–5.1)
PROT SERPL-MCNC: 7.1 G/DL (ref 6–8.4)
PROT UR QL STRIP: ABNORMAL
RBC # BLD AUTO: 4.98 M/UL (ref 4–5.4)
RBC #/AREA URNS AUTO: >100 /HPF (ref 0–4)
SARS-COV-2 RDRP RESP QL NAA+PROBE: NEGATIVE
SODIUM SERPL-SCNC: 136 MMOL/L (ref 136–145)
SP GR UR STRIP: 1.01 (ref 1–1.03)
SQUAMOUS #/AREA URNS AUTO: 7 /HPF
URN SPEC COLLECT METH UR: ABNORMAL
WBC # BLD AUTO: 7.74 K/UL (ref 3.9–12.7)
WBC #/AREA URNS AUTO: 21 /HPF (ref 0–5)

## 2022-04-28 PROCEDURE — 99285 EMERGENCY DEPT VISIT HI MDM: CPT | Mod: CS,,, | Performed by: PHYSICIAN ASSISTANT

## 2022-04-28 PROCEDURE — 99223 1ST HOSP IP/OBS HIGH 75: CPT | Mod: AI,,, | Performed by: FAMILY MEDICINE

## 2022-04-28 PROCEDURE — 83735 ASSAY OF MAGNESIUM: CPT | Performed by: PHYSICIAN ASSISTANT

## 2022-04-28 PROCEDURE — 96365 THER/PROPH/DIAG IV INF INIT: CPT

## 2022-04-28 PROCEDURE — 94761 N-INVAS EAR/PLS OXIMETRY MLT: CPT

## 2022-04-28 PROCEDURE — 12000002 HC ACUTE/MED SURGE SEMI-PRIVATE ROOM

## 2022-04-28 PROCEDURE — 63600175 PHARM REV CODE 636 W HCPCS: Performed by: FAMILY MEDICINE

## 2022-04-28 PROCEDURE — C1769 GUIDE WIRE: HCPCS | Performed by: UROLOGY

## 2022-04-28 PROCEDURE — 25000003 PHARM REV CODE 250: Performed by: NURSE ANESTHETIST, CERTIFIED REGISTERED

## 2022-04-28 PROCEDURE — 87086 URINE CULTURE/COLONY COUNT: CPT | Mod: 59 | Performed by: UROLOGY

## 2022-04-28 PROCEDURE — 87106 FUNGI IDENTIFICATION YEAST: CPT | Mod: 59 | Performed by: PHYSICIAN ASSISTANT

## 2022-04-28 PROCEDURE — 81001 URINALYSIS AUTO W/SCOPE: CPT | Performed by: PHYSICIAN ASSISTANT

## 2022-04-28 PROCEDURE — 25000003 PHARM REV CODE 250: Performed by: FAMILY MEDICINE

## 2022-04-28 PROCEDURE — 36000707: Performed by: UROLOGY

## 2022-04-28 PROCEDURE — 37000008 HC ANESTHESIA 1ST 15 MINUTES: Performed by: UROLOGY

## 2022-04-28 PROCEDURE — 96361 HYDRATE IV INFUSION ADD-ON: CPT

## 2022-04-28 PROCEDURE — 80053 COMPREHEN METABOLIC PANEL: CPT | Performed by: PHYSICIAN ASSISTANT

## 2022-04-28 PROCEDURE — C1758 CATHETER, URETERAL: HCPCS | Performed by: UROLOGY

## 2022-04-28 PROCEDURE — U0002 COVID-19 LAB TEST NON-CDC: HCPCS | Performed by: PHYSICIAN ASSISTANT

## 2022-04-28 PROCEDURE — 52332 PR CYSTOSCOPY,INSERT URETERAL STENT: ICD-10-PCS | Mod: RT,,, | Performed by: UROLOGY

## 2022-04-28 PROCEDURE — 63600175 PHARM REV CODE 636 W HCPCS: Performed by: NURSE ANESTHETIST, CERTIFIED REGISTERED

## 2022-04-28 PROCEDURE — 83605 ASSAY OF LACTIC ACID: CPT | Performed by: EMERGENCY MEDICINE

## 2022-04-28 PROCEDURE — 85025 COMPLETE CBC W/AUTO DIFF WBC: CPT | Performed by: PHYSICIAN ASSISTANT

## 2022-04-28 PROCEDURE — D9220A PRA ANESTHESIA: ICD-10-PCS | Mod: ANES,,, | Performed by: ANESTHESIOLOGY

## 2022-04-28 PROCEDURE — 99285 PR EMERGENCY DEPT VISIT,LEVEL V: ICD-10-PCS | Mod: CS,,, | Performed by: PHYSICIAN ASSISTANT

## 2022-04-28 PROCEDURE — 87086 URINE CULTURE/COLONY COUNT: CPT | Performed by: PHYSICIAN ASSISTANT

## 2022-04-28 PROCEDURE — D9220A PRA ANESTHESIA: Mod: ANES,,, | Performed by: ANESTHESIOLOGY

## 2022-04-28 PROCEDURE — C2617 STENT, NON-COR, TEM W/O DEL: HCPCS | Performed by: UROLOGY

## 2022-04-28 PROCEDURE — 63600175 PHARM REV CODE 636 W HCPCS: Performed by: PHYSICIAN ASSISTANT

## 2022-04-28 PROCEDURE — 74420 UROGRAPHY RTRGR +-KUB: CPT | Mod: 26,,, | Performed by: UROLOGY

## 2022-04-28 PROCEDURE — 36000706: Performed by: UROLOGY

## 2022-04-28 PROCEDURE — 52332 CYSTOSCOPY AND TREATMENT: CPT | Mod: RT,,, | Performed by: UROLOGY

## 2022-04-28 PROCEDURE — D9220A PRA ANESTHESIA: Mod: CRNA,,, | Performed by: STUDENT IN AN ORGANIZED HEALTH CARE EDUCATION/TRAINING PROGRAM

## 2022-04-28 PROCEDURE — 37000009 HC ANESTHESIA EA ADD 15 MINS: Performed by: UROLOGY

## 2022-04-28 PROCEDURE — 71000033 HC RECOVERY, INTIAL HOUR: Performed by: UROLOGY

## 2022-04-28 PROCEDURE — 99223 PR INITIAL HOSPITAL CARE,LEVL III: ICD-10-PCS | Mod: AI,,, | Performed by: FAMILY MEDICINE

## 2022-04-28 PROCEDURE — 25000003 PHARM REV CODE 250: Performed by: PHYSICIAN ASSISTANT

## 2022-04-28 PROCEDURE — 99285 EMERGENCY DEPT VISIT HI MDM: CPT | Mod: 25

## 2022-04-28 PROCEDURE — D9220A PRA ANESTHESIA: ICD-10-PCS | Mod: CRNA,,, | Performed by: STUDENT IN AN ORGANIZED HEALTH CARE EDUCATION/TRAINING PROGRAM

## 2022-04-28 PROCEDURE — 74420 PR  X-RAY RETROGRADE PYELOGRAM: ICD-10-PCS | Mod: 26,,, | Performed by: UROLOGY

## 2022-04-28 PROCEDURE — 87040 BLOOD CULTURE FOR BACTERIA: CPT | Performed by: PHYSICIAN ASSISTANT

## 2022-04-28 DEVICE — STENT URETERAL UNIV 6FR 26CM
Type: IMPLANTABLE DEVICE | Site: URETER | Status: NON-FUNCTIONAL
Removed: 2022-05-30

## 2022-04-28 RX ORDER — GLUCAGON 1 MG
1 KIT INJECTION
Status: DISCONTINUED | OUTPATIENT
Start: 2022-04-28 | End: 2022-05-06 | Stop reason: HOSPADM

## 2022-04-28 RX ORDER — IBUPROFEN 200 MG
24 TABLET ORAL
Status: DISCONTINUED | OUTPATIENT
Start: 2022-04-28 | End: 2022-05-06 | Stop reason: HOSPADM

## 2022-04-28 RX ORDER — ATORVASTATIN CALCIUM 20 MG/1
20 TABLET, FILM COATED ORAL NIGHTLY
Refills: 0 | Status: DISCONTINUED | OUTPATIENT
Start: 2022-04-28 | End: 2022-05-06 | Stop reason: HOSPADM

## 2022-04-28 RX ORDER — ETOMIDATE 2 MG/ML
INJECTION INTRAVENOUS
Status: DISCONTINUED | OUTPATIENT
Start: 2022-04-28 | End: 2022-04-29

## 2022-04-28 RX ORDER — HEPARIN SODIUM 5000 [USP'U]/ML
5000 INJECTION, SOLUTION INTRAVENOUS; SUBCUTANEOUS EVERY 8 HOURS
Status: DISCONTINUED | OUTPATIENT
Start: 2022-04-28 | End: 2022-04-29

## 2022-04-28 RX ORDER — MORPHINE SULFATE 2 MG/ML
4 INJECTION, SOLUTION INTRAMUSCULAR; INTRAVENOUS EVERY 4 HOURS PRN
Status: DISCONTINUED | OUTPATIENT
Start: 2022-04-28 | End: 2022-05-06 | Stop reason: HOSPADM

## 2022-04-28 RX ORDER — METOPROLOL SUCCINATE 50 MG/1
100 TABLET, EXTENDED RELEASE ORAL DAILY
Status: DISCONTINUED | OUTPATIENT
Start: 2022-04-29 | End: 2022-05-03

## 2022-04-28 RX ORDER — MORPHINE SULFATE 2 MG/ML
2 INJECTION, SOLUTION INTRAMUSCULAR; INTRAVENOUS EVERY 4 HOURS PRN
Status: DISCONTINUED | OUTPATIENT
Start: 2022-04-28 | End: 2022-05-06 | Stop reason: HOSPADM

## 2022-04-28 RX ORDER — HYDROMORPHONE HYDROCHLORIDE 1 MG/ML
0.2 INJECTION, SOLUTION INTRAMUSCULAR; INTRAVENOUS; SUBCUTANEOUS EVERY 5 MIN PRN
Status: DISCONTINUED | OUTPATIENT
Start: 2022-04-28 | End: 2022-04-28 | Stop reason: HOSPADM

## 2022-04-28 RX ORDER — OXYBUTYNIN CHLORIDE 5 MG/1
5 TABLET ORAL 3 TIMES DAILY
Status: DISCONTINUED | OUTPATIENT
Start: 2022-04-28 | End: 2022-05-06 | Stop reason: HOSPADM

## 2022-04-28 RX ORDER — FENTANYL CITRATE 50 UG/ML
INJECTION, SOLUTION INTRAMUSCULAR; INTRAVENOUS
Status: DISCONTINUED | OUTPATIENT
Start: 2022-04-28 | End: 2022-04-29

## 2022-04-28 RX ORDER — SODIUM CHLORIDE 0.9 % (FLUSH) 0.9 %
3 SYRINGE (ML) INJECTION
Status: DISCONTINUED | OUTPATIENT
Start: 2022-04-28 | End: 2022-05-06 | Stop reason: HOSPADM

## 2022-04-28 RX ORDER — LANOLIN ALCOHOL/MO/W.PET/CERES
800 CREAM (GRAM) TOPICAL
Status: DISCONTINUED | OUTPATIENT
Start: 2022-04-28 | End: 2022-04-29

## 2022-04-28 RX ORDER — NALOXONE HCL 0.4 MG/ML
0.02 VIAL (ML) INJECTION
Status: DISCONTINUED | OUTPATIENT
Start: 2022-04-28 | End: 2022-05-06 | Stop reason: HOSPADM

## 2022-04-28 RX ORDER — ACETAMINOPHEN 500 MG
1000 TABLET ORAL EVERY 8 HOURS PRN
Status: DISCONTINUED | OUTPATIENT
Start: 2022-04-28 | End: 2022-05-06 | Stop reason: HOSPADM

## 2022-04-28 RX ORDER — IBUPROFEN 200 MG
16 TABLET ORAL
Status: DISCONTINUED | OUTPATIENT
Start: 2022-04-28 | End: 2022-05-06 | Stop reason: HOSPADM

## 2022-04-28 RX ORDER — PROPOFOL 10 MG/ML
VIAL (ML) INTRAVENOUS CONTINUOUS PRN
Status: DISCONTINUED | OUTPATIENT
Start: 2022-04-28 | End: 2022-04-29

## 2022-04-28 RX ORDER — LIDOCAINE HYDROCHLORIDE 20 MG/ML
INJECTION INTRAVENOUS
Status: DISCONTINUED | OUTPATIENT
Start: 2022-04-28 | End: 2022-04-29

## 2022-04-28 RX ADMIN — HEPARIN SODIUM 5000 UNITS: 5000 INJECTION INTRAVENOUS; SUBCUTANEOUS at 09:04

## 2022-04-28 RX ADMIN — ETOMIDATE 8 MG: 2 INJECTION, SOLUTION INTRAVENOUS at 04:04

## 2022-04-28 RX ADMIN — ACETAMINOPHEN 1000 MG: 500 TABLET ORAL at 07:04

## 2022-04-28 RX ADMIN — SODIUM CHLORIDE, SODIUM LACTATE, POTASSIUM CHLORIDE, AND CALCIUM CHLORIDE 500 ML: .6; .31; .03; .02 INJECTION, SOLUTION INTRAVENOUS at 02:04

## 2022-04-28 RX ADMIN — FENTANYL CITRATE 25 MCG: 50 INJECTION, SOLUTION INTRAMUSCULAR; INTRAVENOUS at 04:04

## 2022-04-28 RX ADMIN — SODIUM CHLORIDE: 9 INJECTION, SOLUTION INTRAVENOUS at 04:04

## 2022-04-28 RX ADMIN — OXYBUTYNIN CHLORIDE 5 MG: 5 TABLET ORAL at 09:04

## 2022-04-28 RX ADMIN — PROPOFOL 50 MCG/KG/MIN: 10 INJECTION, EMULSION INTRAVENOUS at 04:04

## 2022-04-28 RX ADMIN — PIPERACILLIN SODIUM AND TAZOBACTAM SODIUM 4.5 G: 4; .5 INJECTION, POWDER, FOR SOLUTION INTRAVENOUS at 03:04

## 2022-04-28 RX ADMIN — ATORVASTATIN CALCIUM 20 MG: 20 TABLET, FILM COATED ORAL at 09:04

## 2022-04-28 RX ADMIN — FENTANYL CITRATE 25 MCG: 50 INJECTION, SOLUTION INTRAMUSCULAR; INTRAVENOUS at 05:04

## 2022-04-28 RX ADMIN — LIDOCAINE HYDROCHLORIDE 100 MG: 20 INJECTION, SOLUTION INTRAVENOUS at 04:04

## 2022-04-28 NOTE — ASSESSMENT & PLAN NOTE
77 yo F with febrile right ureteral stone and MARQUEZ.    - Recommend admission to hospital medicine for medical co morbidities  - To OR for class A cystoscopy with right ureteral stent placement. If unable to place ureteral stent she will need a nephrostomy tube.  - NPO  - IVF  - Antibiotics  - Consented  - Pain control  - Nausea control  - Flomax  - Strain all urine

## 2022-04-28 NOTE — H&P
[facility] - Surgery ([address] [place] [address] Medicine  History & Physical    Patient Name: Misa Barajas  MRN: [id number]  Patient Class: IP- Inpatient  Admission Date: 4/28/2022  Attending Physician: Celina Pereira MD  Primary Care Provider: Celia Carlisle MD      Patient information was obtained from patient, past medical records and ER records.     Subjective:     Principal Problem:Right ureteral stone    Chief Complaint:   Chief Complaint   Patient presents with    Hematuria     Since Monday. Denies pain or burning. + chills, denies fevers.         HPI: Misa Barajas is a 76 y.o. female with medical history of COPD, CRF on O2, Diastolic HF (EF 65%), Parox AFib on Eliquis, HTN, HLD, CKD, Hx of Breast cancer s/p L mastectomy who was admitted to the hospital for an infected kidney stone. Patient reports 5 days of hematuria with chills. No reported fever. Concerned she is having a side effect of her Eliquis. Denies abdominal pain, flank pain, dysuria, diarrhea, constipation.   CT RSS obtained in the ED showed severe right hydroureteronephrosis to the level of a 1.3cm right distal ureteral stone. WBC 7.7. Cr 2.0 (BL 1.2). UA nitrite negative, >100 RBC, 21 WBC, no bacteria. Febrile to 101.5 in the ED. Urology was consulted. She was taken to the OR for class A cystoscopy with right ureteral stent placement.  She was seen in the PACU and has no current pain.       Past Medical History:   Diagnosis Date    Acute hypoxemic respiratory failure 6/26/2021    Aortic atherosclerosis     Arthritis     knee joint pain    Breast cancer 2002    left breast & lymph nodes-s/p sx with chemo    Bronchitis     with flu-2/2014    Cataracts, bilateral     Chronic diastolic heart failure 12/24/2019    Chronic kidney disease, stage 3     History of chemotherapy     last treatment 12/2002 (had 8 treatments)    Hyperlipidemia 11/20/2016    Hypertension     Osteoporosis     Paroxysmal atrial fibrillation 6/7/2021    Renal  calculi     hematuria    Vitamin D insufficiency        Past Surgical History:   Procedure Laterality Date    BREAST BIOPSY Left 2002    core bx, +    BREAST BIOPSY Right 2018    core    BREAST BIOPSY Right 2019    BREAST LUMPECTOMY Left 2002    LITHOTRIPSY      MASTECTOMY Left 06/2002    left-& lymph node dissection    ROBOT-ASSISTED LAPAROSCOPIC PARTIAL NEPHRECTOMY USING DA JESÚS XI Right 3/11/2021    Procedure: XI ROBOTIC NEPHRECTOMY, PARTIAL;  Surgeon: Taz Ortega MD;  Location: [facility] OR [address] [place] [address];  Service: Urology;  Laterality: Right;  4hr/ gen with regional  [place] confirmation:  [id number] for 11:15am case [place]    ureteroscopy Bilateral     6.14       Review of patient's allergies indicates:   Allergen Reactions    Adhesive Rash     Pt states she removed a LATEX bandaid and the skin beneath was swollen and red. No other latex causes a reaction.       No current facility-administered medications on file prior to encounter.     Current Outpatient Medications on File Prior to Encounter   Medication Sig    acetaminophen (TYLENOL) 500 MG tablet Take 2 tablets (1,000 mg total) by mouth every 8 (eight) hours as needed for Pain.    albuterol (ACCUNEB) 0.63 mg/3 mL Nebu Take 3 mLs (0.63 mg total) by nebulization every 6 (six) hours as needed. Rescue    apixaban (ELIQUIS) 5 mg Tab Take 5 mg by mouth once daily.    aspirin (ECOTRIN) 81 MG EC tablet Take 81 mg by mouth once daily.    biotin 1 mg tablet Take 1,000 mcg by mouth 3 (three) times daily.    budesonide-glycopyr-formoterol (BREZTRI AEROSPHERE) 160-9-4.8 mcg/actuation HFAA Inhale 2 puffs into the lungs 2 (two) times daily.    fish oil-omega-3 fatty acids 300-1,000 mg capsule Take 2 g by mouth once daily.    furosemide (LASIX) 20 MG tablet Take 1 tablet by mouth daily If weight gain 2 to3 lbs for 2 to 5 days. If no improvement, call MD (Patient taking differently: Take 1 tablet by mouth daily If weight gain 2 to3 lbs for 2 to 5 days. If no  improvement, call MD)    inhalation spacing device Use as directed for inhalation.    losartan (COZAAR) 25 MG tablet Take 1 tablet (25 mg total) by mouth once daily.    metoprolol succinate (TOPROL-XL) 100 MG 24 hr tablet Take 1 tablet (100 mg total) by mouth once daily.    ondansetron (ZOFRAN) 4 MG tablet Take 1 tablet (4 mg total) by mouth every 8 (eight) hours as needed for Nausea.    rosuvastatin (CRESTOR) 5 MG tablet Take 1 tablet (5 mg total) by mouth once daily.    vitamin D (VITAMIN D3) 1000 units Tab TAKE 2 TABLETS ONE TIME DAILY    [DISCONTINUED] rosuvastatin (CRESTOR) 5 MG tablet Take 1 tablet (5 mg total) by mouth once daily.     Family History       Problem Relation (Age of Onset)    Arthritis Mother, Sister    Bone cancer Mother    Breast cancer Mother, Sister    Cancer Father    Crohn's disease Daughter    Fibroids Daughter    No Known Problems Brother, Daughter          Tobacco Use    Smoking status: Former Smoker     Packs/day: 1.00     Years: 50.00     Pack years: 50.00     Types: Cigarettes     Start date:      Quit date: 2009     Years since quittin.9    Smokeless tobacco: Never Used   Substance and Sexual Activity    Alcohol use: No    Drug use: No    Sexual activity: Not Currently     Partners: Male     Birth control/protection: Partner-Vasectomy     Review of Systems   Constitutional:  Positive for fever. Negative for activity change, appetite change, chills, diaphoresis and fatigue.   HENT:  Negative for congestion, sinus pressure, sore throat and tinnitus.    Eyes:  Negative for visual disturbance.   Respiratory:  Negative for cough, chest tightness, shortness of breath and wheezing.    Cardiovascular:  Negative for chest pain, palpitations and leg swelling.   Gastrointestinal:  Negative for abdominal distention, abdominal pain, nausea and vomiting.   Endocrine: Negative for polydipsia, polyphagia and polyuria.   Genitourinary:  Positive for hematuria. Negative for  dysuria.   Musculoskeletal:  Negative for arthralgias and back pain.   Skin:  Negative for rash and wound.   Neurological:  Negative for dizziness, syncope, light-headedness and headaches.   Psychiatric/Behavioral:  Negative for confusion.    Objective:     Vital Signs (Most Recent):  Temp: 97.7 °F (36.5 °C) (04/28/22 1715)  Pulse: 85 (04/28/22 1715)  Resp: 19 (04/28/22 1715)  BP: 136/64 (04/28/22 1715)  SpO2: 99 % (04/28/22 1715) Vital Signs (24h Range):  Temp:  [97.7 °F (36.5 °C)-101.5 °F (38.6 °C)] 97.7 °F (36.5 °C)  Pulse:  [84-85] 85  Resp:  [18-19] 19  SpO2:  [92 %-99 %] 99 %  BP: (136-147)/(64-65) 136/64     Weight: 75.8 kg (167 lb)  Body mass index is 28.67 kg/m².    Physical Exam  Vitals and nursing note reviewed.   Constitutional:       General: She is not in acute distress.     Appearance: Normal appearance. She is well-developed. She is not ill-appearing or toxic-appearing.   HENT:      Head: Normocephalic and atraumatic.      Right Ear: External ear normal.      Left Ear: External ear normal.      Nose: Nose normal.      Mouth/Throat:      Pharynx: Uvula midline.   Eyes:      General: Lids are normal.      Extraocular Movements: EOM normal.      Conjunctiva/sclera: Conjunctivae normal.      Pupils: Pupils are equal, round, and reactive to light.   Neck:      Thyroid: No thyroid mass.      Vascular: No JVD.      Trachea: Trachea normal.   Cardiovascular:      Rate and Rhythm: Normal rate and regular rhythm.      Heart sounds: Normal heart sounds, S1 normal and S2 normal.   Pulmonary:      Effort: Pulmonary effort is normal.      Breath sounds: Normal breath sounds.   Abdominal:      General: Bowel sounds are normal. There is no distension.      Palpations: Abdomen is soft.      Tenderness: There is no abdominal tenderness.   Genitourinary:     Comments: Muniz in place with hematuria  Musculoskeletal:      Cervical back: Full passive range of motion without pain, normal range of motion and neck supple.       Right lower leg: No edema.      Left lower leg: No edema.   Neurological:      Mental Status: She is alert.      Cranial Nerves: No cranial nerve deficit.      Sensory: No sensory deficit.   Psychiatric:         Speech: Speech normal.         Behavior: Behavior normal. Behavior is cooperative.         Thought Content: Thought content normal.         CRANIAL NERVES     CN III, IV, VI   Pupils are equal, round, and reactive to light.  Extraocular motions are normal.      Significant Labs: All pertinent labs within the past 24 hours have been reviewed.  CBC:   Recent Labs   Lab 04/28/22  1312   WBC 7.74   HGB 15.1   HCT 45.8        CMP:   Recent Labs   Lab 04/28/22  1312      K 4.8      CO2 25   *   BUN 26*   CREATININE 2.0*   CALCIUM 10.1   PROT 7.1   ALBUMIN 3.2*   BILITOT 2.0*   ALKPHOS 83   AST 46*   ALT 26   ANIONGAP 11   EGFRNONAA 23.4*     Cardiac Markers: No results for input(s): CKMB, MYOGLOBIN, BNP, TROPISTAT in the last 48 hours.  Lactic Acid:   Recent Labs   Lab 04/28/22  1449   LACTATE 1.2       Significant Imaging: I have reviewed all pertinent imaging results/findings within the past 24 hours.    Assessment/Plan:     R renal stone  R hydronephrosis and hydroureter  Pyelonephritis   - s/p stent placement. Discussed with Dr. Bliss. Continue IV abx for pyuria and pyonephrosis seen during procedure. Ucx sent. Continue anti pyretics prn. Oxybutynin for spasms.       MARQUEZ on CKD III  Secondary to obstructive stone. Anticipate spontaneous improvement s/p stent placement.       COPD  Chronic respiratory failure secondary to hypoxia  - only uses her O2 prn at home. Will monitor  - Breo and prn inh tx    Chronic diastolic CHF  Stable. Continue home meds. Monitor fluids.     PAF  Stable. Continue BB. Can likely resume Eliquis tomorrow. Currently in NSR.     HTN  Stable. Monitor in setting of systemic infection.      VTE Risk Mitigation (From admission, onward)         Ordered     heparin  (porcine) injection 5,000 Units  Every 8 hours         04/28/22 1723     IP VTE HIGH RISK PATIENT  Once         04/28/22 1723     Place sequential compression device  Until discontinued         04/28/22 1723                   Celina Pereira MD  Department of Hospital Medicine   [facility] - Surgery (2nd Fl)

## 2022-04-28 NOTE — OP NOTE
[facility] Note    Date: 04/28/2022    Pre-Op Diagnosis:   Right ureteral stone and fever, MARQUEZ    Patient Active Problem List    Diagnosis Date Noted    Right ureteral stone 04/28/2022    Lymphedema of both lower extremities 01/25/2022    Venous insufficiency of both lower extremities 01/25/2022    Venous stasis dermatitis of both lower extremities 01/25/2022    Acute superficial venous thrombosis of lower extremity, bilateral 01/25/2022    Heart failure with preserved ejection fraction 12/05/2021    Shortness of breath     Atelectasis 06/28/2021    Acute hypoxemic respiratory failure 06/26/2021    Pleural effusion 06/25/2021    Hydronephrosis 06/24/2021    Pulmonary vascular congestion 06/23/2021    Centrilobular emphysema 06/22/2021    Paroxysmal atrial fibrillation 06/07/2021    Right renal mass 03/11/2021    Acute on chronic diastolic heart failure 12/24/2019    Varicose veins of both legs with edema 08/28/2019    Obesity (BMI 30.0-34.9) 08/22/2019    CKD (chronic kidney disease) stage 3, GFR 30-59 ml/min 02/14/2018    Breast calcification, right 07/20/2017    Osteopenia 07/10/2017    Calcified granuloma of lung 05/30/2017    RBBB 05/17/2017    Hyperlipidemia 11/20/2016    Aortic atherosclerosis 08/08/2016    Osteoarthritis of knee 01/14/2016    Renal osteodystrophy 01/14/2016    History of breast cancer 11/27/2015    Hyperparathyroidism due to renal insufficiency 11/27/2015    Impaired fasting glucose 11/27/2015    Polyneuropathy following chemotherapy 11/27/2015    Arthritis of facet joints at multiple vertebral levels 07/24/2015    Essential hypertension 05/28/2015    Nephrolithiasis 06/02/2014      Post-Op Diagnosis: same    Procedure(s) Performed:    1. Cystoscopy with right ureteral JJ stent placement  2. Fluoroscopy < 1 h  3. Right retrograde pyelogram    Specimen(s): urine for culture    Staff Surgeon: Vik Ware MD    Assistant Surgeon:  Kwabena Anand MD; Rik Bliss MD    Anesthesia: Monitored Local Anesthesia with Sedation    Indications: Misa Barajas is a 76 y.o. female with right ureteral stone and MARQUEZ, fever.    Findings:  Significant hematuria within the bladder.  Gentle right retrograde pyelogram unable to fill the mid to proximal ureter due to significant hydroureter.  Pyonephrosis on stent placement.    Estimated Blood Loss: min    Drains:    6 Romansh x 26 cm right JJ ureteral stent without strings  20Fr 2-way Muniz catheter    Procedure in Detail:  After risks, benefits and possible complications of the procedure were explained, the patient elected to undergo the procedure and informed consent was obtained. All questions were answered in the magnolia-operative area. The patient was transferred to the cystoscopy suite and placed on the fluoroscopy table in the supine position.  SCDs were applied and working. Time out was performed, magnolia-procedural antibiotics were given. Anesthesia was administered.  After adequate anesthesia the patient was placed in dorsal lithotomy position and prepped and draped in the usual sterile fashion.     A rigid cystoscope in a 22 Fr sheath was introduced into the patients bladder per urethra. This passed easily.  The entire urethra was visualized and revealed no strictures or masses.  Cystoscopy was performed which showed the right and left ureteral orifices in the normal anatomic position.  There were no bladder tumors, no  trabeculations, and no stones, though visibility was difficult due to hematuria.      A 5 Fr open ended ureteral catheter was inserted into the right UO. Using fluoroscopy, a retrograde pyelogram was performed which showed the above findings.  A  wire was advanced up the 5 Fr open ended ureteral catheter into the right ureteral orifice to the level of the expected renal pelvis.  This was confirmed using fluoroscopy. The 5 Fr open ended ureteral catheter was removed.    We then  passed a 6 Fr x 26 cm JJ ureteral stent without strings over the wire to the level of the renal pelvis under direct vision as well as flouroscopy. The guide wire was removed.  A 180 degree coil was observed in the renal pelvis as well as the bladder using fluoroscopy.  A 180 degree coil was also seen using direct visualization in the bladder.     A 20Fr 2-way Muniz catheter was placed in normal sterile fashion.    The patient tolerated the procedure well and was transferred to the recovery room in stable condition.      Disposition: The patient will be admitted to [facility] medicine.      Kwabena Anand MD

## 2022-04-28 NOTE — ANESTHESIA PREPROCEDURE EVALUATION
[facility]  Anesthesia Pre-Operative Evaluation         Patient Name: Misa Barajas  YOB: 1945  MRN: [id number]    SUBJECTIVE:     Pre-operative evaluation for Procedure(s) (LRB):  CYSTOSCOPY, WITH URETERAL STENT INSERTION (Right)     04/28/2022    Misa Barajas is a 76 y.o. female w/ a significant PMHx of COPD, CRF on O2, Diastolic HF (EF 65%), Parox AFib on Eliquis, HTN, HLD, CKD, Hx of Breast cancer s/p L mastectomy presenting to the ED with the chief complaint of hematuria. .    Patient now presents for the above procedure(s).      LDA: None documented.       Peripheral IV - Single Lumen 04/28/22 1313 22 G Right Hand (Active)   Site Assessment Clean;Dry;Intact 04/28/22 1313   Extremity Assessment Distal to IV No abnormal discoloration;No redness;No swelling;No warmth 04/28/22 1313   Line Status Flushed;Saline locked;Blood return noted 04/28/22 1313   Dressing Status Clean;Dry;Intact 04/28/22 1313   Dressing Intervention First dressing 04/28/22 1313   Number of days: 0       Prev airway: Placement Date: 03/11/21; Placement Time: 1131 (created via procedure documentation); Method of Intubation: Direct laryngoscopy; Mask Ventilation: Easy; Intubated: Postinduction; Blade: Katheryn #3; Airway Device Size: 7.0; Placement Verified By: Capnometry; Complicating Factors: None; Intubation Findings: Bilateral breath sounds, Atraumatic/Condition of teeth unchanged; Securment: Teeth; Complications: None; Removal Date: 03/11/21;  Removal Time: 1610    Drips: None documented.      Patient Active Problem List   Diagnosis    Nephrolithiasis    Essential hypertension    History of breast cancer    Hyperparathyroidism due to renal insufficiency    Impaired fasting glucose    Polyneuropathy following chemotherapy    Osteoarthritis of knee    Renal osteodystrophy    Aortic atherosclerosis    RBBB    Osteopenia    Arthritis of facet joints at multiple vertebral levels    Breast  calcification, right    CKD (chronic kidney disease) stage 3, GFR 30-59 ml/min    Calcified granuloma of lung    Obesity (BMI 30.0-34.9)    Varicose veins of both legs with edema    Acute on chronic diastolic heart failure    Right renal mass    Paroxysmal atrial fibrillation    Hyperlipidemia    Centrilobular emphysema    Pulmonary vascular congestion    Hydronephrosis    Pleural effusion    Acute hypoxemic respiratory failure    Atelectasis    Shortness of breath    Heart failure with preserved ejection fraction    Lymphedema of both lower extremities    Venous insufficiency of both lower extremities    Venous stasis dermatitis of both lower extremities    Acute superficial venous thrombosis of lower extremity, bilateral    Right ureteral stone       Review of patient's allergies indicates:   Allergen Reactions    Adhesive Rash     Pt states she removed a LATEX bandaid and the skin beneath was swollen and red. No other latex causes a reaction.       Current Inpatient Medications:   lactated ringers  500 mL Intravenous ED 1 Time       No current facility-administered medications on file prior to encounter.     Current Outpatient Medications on File Prior to Encounter   Medication Sig Dispense Refill    acetaminophen (TYLENOL) 500 MG tablet Take 2 tablets (1,000 mg total) by mouth every 8 (eight) hours as needed for Pain. 42 tablet 0    albuterol (ACCUNEB) 0.63 mg/3 mL Nebu Take 3 mLs (0.63 mg total) by nebulization every 6 (six) hours as needed. Rescue 75 mL 0    apixaban (ELIQUIS) 5 mg Tab Take 5 mg by mouth once daily.      aspirin (ECOTRIN) 81 MG EC tablet Take 81 mg by mouth once daily.      biotin 1 mg tablet Take 1,000 mcg by mouth 3 (three) times daily.      budesonide-glycopyr-formoterol (BREZTRI AEROSPHERE) 160-9-4.8 mcg/actuation HFAA Inhale 2 puffs into the lungs 2 (two) times daily. 32.1 g 3    fish oil-omega-3 fatty acids 300-1,000 mg capsule Take 2 g by mouth once daily.       furosemide (LASIX) 20 MG tablet Take 1 tablet by mouth daily If weight gain 2 to3 lbs for 2 to 5 days. If no improvement, call MD (Patient taking differently: Take 1 tablet by mouth daily If weight gain 2 to3 lbs for 2 to 5 days. If no improvement, call MD) 30 tablet 6    inhalation spacing device Use as directed for inhalation. 1 Device 0    losartan (COZAAR) 25 MG tablet Take 1 tablet (25 mg total) by mouth once daily. 30 tablet 1    metoprolol succinate (TOPROL-XL) 100 MG 24 hr tablet Take 1 tablet (100 mg total) by mouth once daily. 30 tablet 6    ondansetron (ZOFRAN) 4 MG tablet Take 1 tablet (4 mg total) by mouth every 8 (eight) hours as needed for Nausea. 15 tablet 0    rosuvastatin (CRESTOR) 5 MG tablet Take 1 tablet (5 mg total) by mouth once daily. 30 tablet 0    rosuvastatin (CRESTOR) 5 MG tablet Take 1 tablet (5 mg total) by mouth once daily. 90 tablet 3    vitamin D (VITAMIN D3) 1000 units Tab TAKE 2 TABLETS ONE TIME DAILY 180 tablet 0       Past Surgical History:   Procedure Laterality Date    BREAST BIOPSY Left 2002    core bx, +    BREAST BIOPSY Right 2018    core    BREAST BIOPSY Right 2019    BREAST LUMPECTOMY Left 2002    LITHOTRIPSY      MASTECTOMY Left 2002    left-& lymph node dissection    ROBOT-ASSISTED LAPAROSCOPIC PARTIAL NEPHRECTOMY USING DA JESÚS XI Right 3/11/2021    Procedure: XI ROBOTIC NEPHRECTOMY, PARTIAL;  Surgeon: Taz Ortega MD;  Location: [facility] OR [address] [place] [address];  Service: Urology;  Laterality: Right;  4hr/ gen with regional  [place] confirmation:  [id number] for 11:15am case NC    ureteroscopy Bilateral     6.14       Social History     Socioeconomic History    Marital status:    Tobacco Use    Smoking status: Former Smoker     Packs/day: 1.00     Years: 50.00     Pack years: 50.00     Types: Cigarettes     Start date:      Quit date: 2009     Years since quittin.9    Smokeless tobacco: Never Used   Substance and Sexual Activity     Alcohol use: No    Drug use: No    Sexual activity: Not Currently     Partners: Male     Birth control/protection: Partner-Vasectomy     Social Determinants of Health     Financial Resource Strain: Low Risk     Difficulty of Paying Living Expenses: Not hard at all   Food Insecurity: No Food Insecurity    Worried About Running Out of Food in the Last Year: Never true    Ran Out of Food in the Last Year: Never true   Transportation Needs: No Transportation Needs    Lack of Transportation (Medical): No    Lack of Transportation (Non-Medical): No   Physical Activity: Insufficiently Active    Days of Exercise per Week: 3 days    Minutes of Exercise per Session: 20 min   Stress: Stress Concern Present    Feeling of Stress : To some extent   Social Connections: Socially Isolated    Frequency of Communication with Friends and Family: More than three times a week    Frequency of Social Gatherings with Friends and Family: More than three times a week    Attends [employer] Services: Never    Active Member of Clubs or Organizations: No    Attends Club or Organization Meetings: Never    Marital Status:    Housing Stability: Low Risk     Unable to Pay for Housing in the Last Year: No    Number of Places Lived in the Last Year: 1    Unstable Housing in the Last Year: No       OBJECTIVE:     Vital Signs Range (Last 24H):  Temp:  [38.1 °C (100.6 °F)-38.6 °C (101.5 °F)]   Pulse:  [84]   Resp:  [18]   BP: (147)/(65)   SpO2:  [92 %]       Significant Labs:  Lab Results   Component Value Date    WBC 7.74 04/28/2022    HGB 15.1 04/28/2022    HCT 45.8 04/28/2022     04/28/2022    CHOL 140 06/14/2021    TRIG 128 06/14/2021    HDL 65 06/14/2021    ALT 26 04/28/2022    AST 46 (H) 04/28/2022     04/28/2022    K 4.8 04/28/2022     04/28/2022    CREATININE 2.0 (H) 04/28/2022    BUN 26 (H) 04/28/2022    CO2 25 04/28/2022    TSH 4.367 (H) 06/26/2021    INR 0.9 08/09/2010       Diagnostic Studies: No  "relevant studies.    EKG:   Results for orders placed or performed during the hospital encounter of 06/23/21   EKG 12-lead    Collection Time: 06/25/21  2:24 PM    Narrative    Test Reason : I48.91,    Vent. Rate : 102 BPM     Atrial Rate : 330 BPM     P-R Int : 000 ms          QRS Dur : 148 ms      QT Int : 386 ms       P-R-T Axes : 000 -70 080 degrees     QTc Int : 503 ms    Atrial fibrillation  Right bundle branch block  Left anterior fascicular block   Bifascicular block   LVH with repolarization abnormality ( R in aVL )  Abnormal ECG  When compared with ECG of 24-JUN-2021 02:12,  No significant change was found  Confirmed by AUDREY ANDERSON MD ([id number]) on 6/25/2021 9:23:33 PM    Referred By: [email or web address]   SELF           Confirmed By:AUDREY ANDERSON MD       2D ECHO:  TTE:  Results for orders placed or performed during the hospital encounter of 06/09/21   Echo Saline Bubble? No   Result Value Ref Range    BSA 1.88 m2    TDI SEPTAL 0.07 m/s    LV LATERAL E/E' RATIO 17.00 m/s    LV SEPTAL E/E' RATIO 17.00 m/s    LA WIDTH 3.83 cm    TDI LATERAL 0.07 m/s    LVIDd 3.82 3.5 - 6.0 cm    IVS 0.91 0.6 - 1.1 cm    Posterior Wall 0.78 0.6 - 1.1 cm    LVIDs 2.52 2.1 - 4.0 cm    FS 34 28 - 44 %    LA volume 60.73 cm3    Sinus 2.92 cm    STJ 2.24 cm    Ascending aorta 2.30 cm    LV mass 93.40 g    LA size 3.50 cm    RVDD 3.52 cm    TAPSE 2.03 cm    RV S' 13.90 cm/s    Left Ventricle Relative Wall Thickness 0.41 cm    AV mean gradient 5 mmHg    AV valve area 2.12 cm2    AV Velocity Ratio 0.74     AV index (prosthetic) 0.70     MV valve area p 1/2 method 2.97 cm2    E/A ratio 0.86     Mean e' 0.07 m/s    E wave deceleration time 255.77 msec    IVRT 79.92 msec    MV "A" wave duration 7.14 msec    Pulm vein S/D ratio 0.54     LVOT diameter 1.97 cm    LVOT area 3.0 cm2    LVOT peak apurva 1.09 m/s    LVOT peak VTI 22.88 cm    Ao peak apurva 1.48 m/s    Ao VTI 32.89 cm    LVOT stroke volume 69.70 cm3    AV peak gradient 9 mmHg    E/E' ratio 17.00 " m/s    MV Peak E Norberto 1.19 m/s    TR Max Norberto 2.97 m/s    MV stenosis pressure 1/2 time 74.17 ms    MV Peak A Norberto 1.39 m/s    PV Peak S Norbreto 0.29 m/s    PV Peak D Norberto 0.54 m/s    LV Systolic Volume 22.70 mL    LV Systolic Volume Index 12.4 mL/m2    LV Diastolic Volume 62.72 mL    LV Diastolic Volume Index 34.27 mL/m2    LA Volume Index 33.2 mL/m2    LV Mass Index 51 g/m2    RA Major Axis 4.78 cm    Left Atrium Minor Axis 5.35 cm    Left Atrium Major Axis 5.31 cm    Triscuspid Valve Regurgitation Peak Gradient 35 mmHg    LA Volume Index (Mod) 24.4 mL/m2    LA volume (mod) 44.65 cm3    RA Width 3.26 cm    Right Atrial Pressure (from IVC) 3 mmHg    EF 65 %    TV rest pulmonary artery pressure 38 mmHg    Narrative    · The left ventricle is normal in size with normal systolic function. The   estimated ejection fraction is 65%.  · Indeterminate left ventricular diastolic function.  · Normal right ventricular size with normal right ventricular systolic   function.  · Mild mitral regurgitation.  · Mild tricuspid regurgitation.  · The estimated PA systolic pressure is 38 mmHg.  · Normal central venous pressure (3 mmHg).          MAKAYLA:  No results found for this or any previous visit.    ASSESSMENT/PLAN:                                                                                                                  04/28/2022  Misa Barajas is a 76 y.o., female.      Pre-op Assessment    I have reviewed the Patient Summary Reports.     I have reviewed the Nursing Notes. I have reviewed the NPO Status.      Review of Systems  Anesthesia Hx:  No problems with previous Anesthesia LEFT EXTREMITY RESTRICTED, LEFT MASTECTOMY History of prior surgery of interest to airway management or planning: Previous anesthesia: General 2002-LEFT MASTECTOMY with general anesthesia.  Denies Family Hx of Anesthesia complications.   Denies Personal Hx of Anesthesia complications.   Social:  Former Smoker, No Alcohol Use QUIT SMOKING 15 YRS AGO    Hematology/Oncology:  Hematology Normal       -- Cancer in past history:  Breast left chemotherapy and surgery  Oncology Comments: LEFT MASTECTOMY WITH CHEMO-2002     EENT/Dental:EENT/Dental Normal   Cardiovascular:   Hypertension  Denies Angina. hyperlipidemia REED AORTIC ATHEROSCLEROSIS.    11/6/19-ECHO (EF 55%, MILD TR)    HOLTER in 2017 demonstrated NSVT.  Patient evaluated by cardiology.  Stress test 9/2017 negative for ischemia.  Functional Capacity good / => 4 METS  Valvular Heart Disease: Tricuspid Regurgitation (TR), mild   Congestive Heart Failure (CHF) , LV Diastolic HF   Pulmonary:   Denies Shortness of breath.  Denies Recent URI.    Renal/:   renal calculi RIGHT RENAL MASS Kidney Function/Disease, Chronic Kidney Disease (CKD) , CKD Stage III (GFR 30-59)    Hepatic/GI:  Hepatic/GI Normal    Musculoskeletal:  Joint Disease:  Arthritis, Osteoarthritis    Neurological:   POLYNEUROPATHY FOLLOWING CHEMO IN 2002, NUMBNESS TO FINGER TIPS AND TOES Osteoarthritis    Endocrine:   VIT D DEFICIENCY Parathyroid Disease, Hyperparathyroidism  Metabolic Disorders, Obesity / BMI > 30  Psych:  Psychiatric Normal           Physical Exam  General: Well nourished, Cooperative, Alert and Oriented    Airway:  Mallampati: I   Mouth Opening: Normal  TM Distance: Normal  Tongue: Normal  Neck ROM: Normal ROM    Chest/Lungs:  Clear to auscultation, Normal Respiratory Rate    Heart:  Rate: Normal        Anesthesia Plan  Type of Anesthesia, risks & benefits discussed:    Anesthesia Type: Gen ETT  Intra-op Monitoring Plan: Standard ASA Monitors  Post Op Pain Control Plan: multimodal analgesia and IV/PO Opioids PRN  Induction:  IV  Airway Plan: Direct, Post-Induction  Informed Consent: Informed consent signed with the Patient and all parties understand the risks and agree with anesthesia plan.  All questions answered.   ASA Score: 4 Emergent  Day of Surgery Review of History & Physical: H&P Update referred to the  surgeon/provider.    Ready For Surgery From Anesthesia Perspective.     .

## 2022-04-28 NOTE — HPI
Misa Barajas is a 76 y.o. female with medical history of COPD, CRF on O2, Diastolic HF (EF 65%), Parox AFib on Eliquis, HTN, HLD, CKD, Hx of Breast cancer s/p L mastectomy who was admitted to the hospital for an infected kidney stone. Patient reports 5 days of hematuria with chills. No reported fever. Concerned she is having a side effect of her Eliquis. Denies abdominal pain, flank pain, dysuria, diarrhea, constipation.   CT RSS obtained in the ED showed severe right hydroureteronephrosis to the level of a 1.3cm right distal ureteral stone. WBC 7.7. Cr 2.0 (BL 1.2). UA nitrite negative, >100 RBC, 21 WBC, no bacteria. Febrile to 101.5 in the ED. Urology was consulted. She was taken to the OR for class A cystoscopy with right ureteral stent placement.  She was seen in the PACU and has no current pain.

## 2022-04-28 NOTE — TRANSFER OF CARE
"Anesthesia Transfer of Care Note    Patient: Misa Barajas    Procedure(s) Performed: Procedure(s) (LRB):  CYSTOSCOPY  INSERTION, STENT, URETER (Right)  PYELOGRAM, RETROGRADE (Right)    Patient location: PACU    Anesthesia Type: general    Transport from OR: Transported from OR on room air with adequate spontaneous ventilation    Post pain: adequate analgesia    Post assessment: no apparent anesthetic complications and tolerated procedure well    Post vital signs: stable    Level of consciousness: awake, alert and oriented    Nausea/Vomiting: no nausea/vomiting    Complications: none    Transfer of care protocol was followed      Last vitals:   Visit Vitals  BP (!) 147/65   Pulse 84   Temp (!) 38.1 °C (100.6 °F) (Oral)   Resp 18   Ht 5' 4" (1.626 m)   Wt 75.8 kg (167 lb)   SpO2 100   BMI 28.67 kg/m²     "

## 2022-04-28 NOTE — CONSULTS
[facility]  Urology  Consult Note    Patient Name: Misa Barajas  MRN: [id number]  Admission Date: 4/28/2022  Hospital Length of Stay: 0   Code Status: Prior   Attending Provider: Xavi Li MD   Consulting Provider: Kwabena Anand MD  Primary Care Physician: Celia Carlisle MD  Principal Problem:Right ureteral stone    Inpatient consult to Urology  Consult performed by: Kwabena Anand MD  Consult ordered by: Guille Hewitt PA-C          Subjective:     HPI:  Patient is a 77 yo F with PMH of COPD, CRF on O2, Diastolic HF (EF 65%), Parox AFib on Eliquis, HTN, HLD, CKD, Hx of Breast cancer s/p L mastectomy presenting to the ED with the chief complaint of gross hematuria. She has had hematuria, decreased UOP, and chills for a few days. Today she developed fever and chills and came to the ED. She denies flank pain, suprapubic pain, dysuria. She has a history of nephrolithiasis requiring both ESWL and URS in the past.     CT RSS obtained in the ED shows severe right hydroureteronephrosis to the level of a 1.3cm right distal ureteral stone. WBC 7.7. Cr 2.0 (BL 1.2). UA nitrite negative, >100 RBC, 21 WBC, no bacteria. Febrile to 101.5 in the ED. She last ate at 615 am. She last drank water an hour ago.      Past Medical History:   Diagnosis Date    Acute hypoxemic respiratory failure 6/26/2021    Aortic atherosclerosis     Arthritis     knee joint pain    Breast cancer 2002    left breast & lymph nodes-s/p sx with chemo    Bronchitis     with flu-2/2014    Cataracts, bilateral     Chronic diastolic heart failure 12/24/2019    Chronic kidney disease, stage 3     History of chemotherapy     last treatment 12/2002 (had 8 treatments)    Hyperlipidemia 11/20/2016    Hypertension     Osteoporosis     Paroxysmal atrial fibrillation 6/7/2021    Renal calculi     hematuria    Vitamin D insufficiency        Past Surgical History:   Procedure Laterality Date    BREAST BIOPSY Left      core bx, +    BREAST BIOPSY Right 2018    core    BREAST BIOPSY Right 2019    BREAST LUMPECTOMY Left     LITHOTRIPSY      MASTECTOMY Left 2002    left-& lymph node dissection    ROBOT-ASSISTED LAPAROSCOPIC PARTIAL NEPHRECTOMY USING DA JESÚS XI Right 3/11/2021    Procedure: XI ROBOTIC NEPHRECTOMY, PARTIAL;  Surgeon: Taz Ortega MD;  Location: [facility] OR [address] [place] [address];  Service: Urology;  Laterality: Right;  4hr/ gen with regional  [place] confirmation:  [id number] for 11:15am case NC    ureteroscopy Bilateral     6.14       Review of patient's allergies indicates:   Allergen Reactions    Adhesive Rash     Pt states she removed a LATEX bandaid and the skin beneath was swollen and red. No other latex causes a reaction.       Family History       Problem Relation (Age of Onset)    Arthritis Mother, Sister    Bone cancer Mother    Breast cancer Mother, Sister    Cancer Father    Crohn's disease Daughter    Fibroids Daughter    No Known Problems Brother, Daughter            Tobacco Use    Smoking status: Former Smoker     Packs/day: 1.00     Years: 50.00     Pack years: 50.00     Types: Cigarettes     Start date:      Quit date: 2009     Years since quittin.9    Smokeless tobacco: Never Used   Substance and Sexual Activity    Alcohol use: No    Drug use: No    Sexual activity: Not Currently     Partners: Male     Birth control/protection: Partner-Vasectomy       Review of Systems   Constitutional:  Negative for activity change, appetite change, chills, fever and unexpected weight change.   Respiratory:  Negative for shortness of breath.    Cardiovascular:  Negative for chest pain.   Gastrointestinal:  Negative for abdominal pain, constipation, diarrhea, nausea and vomiting.   Genitourinary:  Positive for decreased urine volume and hematuria. Negative for difficulty urinating, dysuria, flank pain, nocturia and urgency.   Musculoskeletal:  Negative for back pain.   Skin:  Negative for  wound.   Neurological:  Negative for headaches.   Psychiatric/Behavioral:  Negative for confusion.      Objective:     Temp:  [100.6 °F (38.1 °C)-101.5 °F (38.6 °C)] 100.6 °F (38.1 °C)  Pulse:  [84] 84  Resp:  [18] 18  SpO2:  [92 %] 92 %  BP: (147)/(65) 147/65     Body mass index is 28.67 kg/m².           Drains       None                   Physical Exam  Constitutional:       General: She is not in acute distress.     Appearance: She is not diaphoretic.   HENT:      Head: Normocephalic and atraumatic.      Nose: Nose normal.   Eyes:      Conjunctiva/sclera: Conjunctivae normal.   Cardiovascular:      Rate and Rhythm: Normal rate.   Pulmonary:      Effort: Pulmonary effort is normal. No respiratory distress.   Abdominal:      General: There is no distension.      Tenderness: There is no abdominal tenderness.   Genitourinary:     Comments: Right CVA tenderness  Musculoskeletal:         General: Normal range of motion.      Cervical back: Normal range of motion.   Skin:     General: Skin is dry.   Neurological:      Mental Status: She is alert.   Psychiatric:         Behavior: Behavior normal.         Thought Content: Thought content normal.       Significant Labs:    BMP:  Recent Labs   Lab 04/28/22  1312      K 4.8      CO2 25   BUN 26*   CREATININE 2.0*   CALCIUM 10.1       CBC:  Recent Labs   Lab 04/28/22  1312   WBC 7.74   HGB 15.1   HCT 45.8          All pertinent labs results from the past 24 hours have been reviewed.    Significant Imaging:  All pertinent imaging results/findings from the past 24 hours have been reviewed.                      Assessment and Plan:     * Right ureteral stone  75 yo F with febrile right ureteral stone and MARQUEZ.    - Recommend admission to hospital medicine for medical co morbidities  - To OR for class A cystoscopy with right ureteral stent placement. If unable to place ureteral stent she will need a nephrostomy tube.  - NPO  - IVF  - Antibiotics  - Consented  -  Pain control  - Nausea control  - Flomax  - Strain all urine          VTE Risk Mitigation (From admission, onward)    None          Thank you for your consult. I will follow-up with patient. Please contact us if you have any additional questions.    Kwabena Anand MD  Urology  [facility] Dept

## 2022-04-28 NOTE — SUBJECTIVE & OBJECTIVE
Past Medical History:   Diagnosis Date    Acute hypoxemic respiratory failure 2021    Aortic atherosclerosis     Arthritis     knee joint pain    Breast cancer     left breast & lymph nodes-s/p sx with chemo    Bronchitis     with flu-2014    Cataracts, bilateral     Chronic diastolic heart failure 2019    Chronic kidney disease, stage 3     History of chemotherapy     last treatment 2002 (had 8 treatments)    Hyperlipidemia 2016    Hypertension     Osteoporosis     Paroxysmal atrial fibrillation 2021    Renal calculi     hematuria    Vitamin D insufficiency        Past Surgical History:   Procedure Laterality Date    BREAST BIOPSY Left     core bx, +    BREAST BIOPSY Right 2018    core    BREAST BIOPSY Right 2019    BREAST LUMPECTOMY Left     LITHOTRIPSY      MASTECTOMY Left 2002    left-& lymph node dissection    ROBOT-ASSISTED LAPAROSCOPIC PARTIAL NEPHRECTOMY USING DA JESÚS XI Right 3/11/2021    Procedure: XI ROBOTIC NEPHRECTOMY, PARTIAL;  Surgeon: Taz Ortega MD;  Location: [facility] OR [address] [place] [address];  Service: Urology;  Laterality: Right;  4hr/ gen with regional  [place] confirmation:  [id number] for 11:15am case NC    ureteroscopy Bilateral     6.14       Review of patient's allergies indicates:   Allergen Reactions    Adhesive Rash     Pt states she removed a LATEX bandaid and the skin beneath was swollen and red. No other latex causes a reaction.       Family History       Problem Relation (Age of Onset)    Arthritis Mother, Sister    Bone cancer Mother    Breast cancer Mother, Sister    Cancer Father    Crohn's disease Daughter    Fibroids Daughter    No Known Problems Brother, Daughter            Tobacco Use    Smoking status: Former Smoker     Packs/day: 1.00     Years: 50.00     Pack years: 50.00     Types: Cigarettes     Start date:      Quit date: 2009     Years since quittin.9    Smokeless tobacco: Never Used   Substance and Sexual Activity    Alcohol  use: No    Drug use: No    Sexual activity: Not Currently     Partners: Male     Birth control/protection: Partner-Vasectomy       Review of Systems   Constitutional:  Negative for activity change, appetite change, chills, fever and unexpected weight change.   Respiratory:  Negative for shortness of breath.    Cardiovascular:  Negative for chest pain.   Gastrointestinal:  Negative for abdominal pain, constipation, diarrhea, nausea and vomiting.   Genitourinary:  Positive for decreased urine volume and hematuria. Negative for difficulty urinating, dysuria, flank pain, nocturia and urgency.   Musculoskeletal:  Negative for back pain.   Skin:  Negative for wound.   Neurological:  Negative for headaches.   Psychiatric/Behavioral:  Negative for confusion.      Objective:     Temp:  [100.6 °F (38.1 °C)-101.5 °F (38.6 °C)] 100.6 °F (38.1 °C)  Pulse:  [84] 84  Resp:  [18] 18  SpO2:  [92 %] 92 %  BP: (147)/(65) 147/65     Body mass index is 28.67 kg/m².           Drains       None                   Physical Exam  Constitutional:       General: She is not in acute distress.     Appearance: She is not diaphoretic.   HENT:      Head: Normocephalic and atraumatic.      Nose: Nose normal.   Eyes:      Conjunctiva/sclera: Conjunctivae normal.   Cardiovascular:      Rate and Rhythm: Normal rate.   Pulmonary:      Effort: Pulmonary effort is normal. No respiratory distress.   Abdominal:      General: There is no distension.      Tenderness: There is no abdominal tenderness.   Genitourinary:     Comments: Right CVA tenderness  Musculoskeletal:         General: Normal range of motion.      Cervical back: Normal range of motion.   Skin:     General: Skin is dry.   Neurological:      Mental Status: She is alert.   Psychiatric:         Behavior: Behavior normal.         Thought Content: Thought content normal.       Significant Labs:    BMP:  Recent Labs   Lab 04/28/22  1312      K 4.8      CO2 25   BUN 26*   CREATININE 2.0*    CALCIUM 10.1       CBC:  Recent Labs   Lab 04/28/22  1312   WBC 7.74   HGB 15.1   HCT 45.8          All pertinent labs results from the past 24 hours have been reviewed.    Significant Imaging:  All pertinent imaging results/findings from the past 24 hours have been reviewed.

## 2022-04-28 NOTE — ED PROVIDER NOTES
Encounter Date: 4/28/2022       History     Chief Complaint   Patient presents with    Hematuria     Since Monday. Denies pain or burning. + chills, denies fevers.      The history is provided by the patient and medical records. No  was used.      Misa Barajas is a 76 y.o. female with medical history of COPD, CRF on O2, Diastolic HF (EF 65%), Parox AFib on Eliquis, HTN, HLD, CKD, Hx of Breast cancer s/p L mastectomy presenting to the ED with the chief complaint of hematuria.     Patient reports 5 days of hematuria with chills. No reported fever, but febrile 101.5 on ED arrival. Concerned she is having a side effect of her Eliquis. Denies abdominal pain, flank pain, dysuria, diarrhea, constipation. Denies history of nephrolithiasis.     Review of patient's allergies indicates:   Allergen Reactions    Adhesive Rash     Pt states she removed a LATEX bandaid and the skin beneath was swollen and red. No other latex causes a reaction.     Past Medical History:   Diagnosis Date    Acute hypoxemic respiratory failure 6/26/2021    Aortic atherosclerosis     Arthritis     knee joint pain    Breast cancer 2002    left breast & lymph nodes-s/p sx with chemo    Bronchitis     with flu-2/2014    Cataracts, bilateral     Chronic diastolic heart failure 12/24/2019    Chronic kidney disease, stage 3     History of chemotherapy     last treatment 12/2002 (had 8 treatments)    Hyperlipidemia 11/20/2016    Hypertension     Osteoporosis     Paroxysmal atrial fibrillation 6/7/2021    Renal calculi     hematuria    Vitamin D insufficiency      Past Surgical History:   Procedure Laterality Date    BREAST BIOPSY Left 2002    core bx, +    BREAST BIOPSY Right 2018    core    BREAST BIOPSY Right 2019    BREAST LUMPECTOMY Left 2002    LITHOTRIPSY      MASTECTOMY Left 06/2002    left-& lymph node dissection    ROBOT-ASSISTED LAPAROSCOPIC PARTIAL NEPHRECTOMY USING DA JESÚS XI Right 3/11/2021     Procedure: XI ROBOTIC NEPHRECTOMY, PARTIAL;  Surgeon: Taz Ortega MD;  Location: [facility] OR [address] [place] [address];  Service: Urology;  Laterality: Right;  4hr/ gen with regional  [facility] confirmation:  [id number] for 11:15am case [place]    ureteroscopy Bilateral     6.14     Family History   Problem Relation Age of Onset    Bone cancer Mother     Breast cancer Mother     Arthritis Mother         Breast Cancer    Breast cancer Sister     Cancer Father         Asbestos cancer    No Known Problems Brother     Fibroids Daughter     Crohn's disease Daughter     No Known Problems Daughter     Arthritis Sister         Breast Cancer    Anesthesia problems Neg Hx     Glaucoma Neg Hx      Social History     Tobacco Use    Smoking status: Former Smoker     Packs/day: 1.00     Years: 50.00     Pack years: 50.00     Types: Cigarettes     Start date:      Quit date: 2009     Years since quittin.9    Smokeless tobacco: Never Used   Substance Use Topics    Alcohol use: No    Drug use: No     Review of Systems   Constitutional: Positive for chills. Negative for fever.   HENT: Negative for sore throat.    Eyes: Negative for pain.   Respiratory: Negative for shortness of breath.    Cardiovascular: Negative for chest pain.   Gastrointestinal: Negative for nausea.   Genitourinary: Positive for hematuria. Negative for dysuria.   Musculoskeletal: Negative for back pain.   Skin: Negative for rash.   Neurological: Negative for weakness.   Hematological: Does not bruise/bleed easily.       Physical Exam     Initial Vitals [22 1117]   BP Pulse Resp Temp SpO2   (!) 147/65 84 18 (!) 101.5 °F (38.6 °C) (S) (!) 92 %      MAP       --         Physical Exam    Constitutional: She appears well-developed and well-nourished. She is not diaphoretic. No distress.   Hard of hearing   HENT:   Head: Normocephalic and atraumatic.   Mouth/Throat: Oropharynx is clear and moist. No oropharyngeal exudate.   Eyes: Conjunctivae and EOM are  normal. Pupils are equal, round, and reactive to light. No scleral icterus.   Neck: Neck supple.   Normal range of motion.  Cardiovascular: Normal rate and regular rhythm.   Pulmonary/Chest: Breath sounds normal. No respiratory distress. She has no wheezes.   POx RA 92% (COPD)   Abdominal: Abdomen is soft. She exhibits no distension. There is no abdominal tenderness. There is no rebound.   Musculoskeletal:         General: No tenderness or edema. Normal range of motion.      Cervical back: Normal range of motion and neck supple.     Neurological: She is alert and oriented to person, place, and time. She has normal strength. No sensory deficit.   Skin: Skin is warm and dry. No rash noted. No erythema.   Psychiatric: She has a normal mood and affect.       ED Course   Procedures  Labs Reviewed   CBC W/ AUTO DIFFERENTIAL - Abnormal; Notable for the following components:       Result Value    Lymph # 0.7 (*)     Gran % 78.7 (*)     Lymph % 9.0 (*)     All other components within normal limits   COMPREHENSIVE METABOLIC PANEL - Abnormal; Notable for the following components:    Glucose 124 (*)     BUN 26 (*)     Creatinine 2.0 (*)     Albumin 3.2 (*)     Total Bilirubin 2.0 (*)     AST 46 (*)     eGFR if  27.0 (*)     eGFR if non  23.4 (*)     All other components within normal limits   URINALYSIS, REFLEX TO URINE CULTURE - Abnormal; Notable for the following components:    Color, UA Red (*)     Appearance, UA Cloudy (*)     Protein, UA 2+ (*)     Occult Blood UA 3+ (*)     Leukocytes, UA Trace (*)     All other components within normal limits    Narrative:     Specimen Source->Urine   URINALYSIS MICROSCOPIC - Abnormal; Notable for the following components:    RBC, UA >100 (*)     WBC, UA 21 (*)     All other components within normal limits    Narrative:     Specimen Source->Urine   CULTURE, URINE   CULTURE, BLOOD   CULTURE, BLOOD   MAGNESIUM   LACTIC ACID, PLASMA   SARS-COV-2 RDRP GENE     Narrative:     This test utilizes isothermal nucleic acid amplification   technology to detect the SARS-CoV-2 RdRp nucleic acid segment.   The analytical sensitivity (limit of detection) is 125 genome   equivalents/mL.   A POSITIVE result implies infection with the SARS-CoV-2 virus;   the patient is presumed to be contagious.     A NEGATIVE result means that SARS-CoV-2 nucleic acids are not   present above the limit of detection. A NEGATIVE result should be   treated as presumptive. It does not rule out the possibility of   COVID-19 and should not be the sole basis for treatment decisions.   If COVID-19 is strongly suspected based on clinical and exposure   history, re-testing using an alternate molecular assay should be   considered.   This test is only for use under the Food and Drug   Administration s Emergency Use Authorization (EUA).   Commercial kits are provided by [employer].   Performance characteristics of the EUA have been independently   verified by [facility] Department of   Pathology and Laboratory Medicine.   _________________________________________________________________   The authorized Fact Sheet for Healthcare Providers and the authorized Fact   Sheet for Patients of the ID NOW COVID-19 are available on the FDA   website:     https://www.fda.gov/media/572514/download  https://www.fda.gov/media/351522/download                 Imaging Results          SURG FL Surgery Fluoro Usage (Final result)  Result time 04/28/22 17:07:09    Final result by Access, Silent  (04/28/22 17:07:09)                 Narrative:    See OP Notes for results.     IMPRESSION: See OP Notes for results.             This procedure was auto-finalized by: [facility] [employer]                             CT Renal Stone Study ABD Pelvis WO (Final result)  Result time 04/28/22 14:46:18    Final result by J Luis Farr Jr., MD (04/28/22 14:46:18)                 Impression:      Right distal ureteral  stone which measures 7 mm causing severe right-sided hydronephrosis and moderate dilatation of the proximal to mid ureter with perinephric stranding and inflammatory change surrounding the course of the visualized ureter.    Additional punctate nonobstructing bilateral renal stones.    Postoperative change including right partial nephrectomy for resection of renal cell carcinoma.  No abnormality identified in the perirenal fascia or at the site of resection on this noncontrast exam.    Trace bilateral pleural effusions, with calcifications.  Findings are nonspecific.    Gallbladder sludge without evidence for cholecystitis.    Multiple colonic diverticula without evidence for diverticulitis.    COMMUNICATION  This critical result was discovered/received at 13:50.  The critical information above was relayed directly by me by telephone to Dr. Li with emergency medicine on 04/28/2022 at 14:10.    Electronically signed by resident: Kyler Sims  Date:    04/28/2022  Time:    13:58    Electronically signed by: J Luis Farr MD  Date:    04/28/2022  Time:    14:46             Narrative:    EXAMINATION:  CT RENAL STONE STUDY ABD PELVIS WO    CLINICAL HISTORY:  Flank pain, kidney stone suspected;    TECHNIQUE:  Routine axial CT images of the abdomen and pelvis were obtained without the use of IV contrast.  Sagittal and coronal reformatted images were also acquired.    COMPARISON:  Ultrasound retroperitoneal complete 06/24/2021, MRI abdomen with without contrast 01/08/2021    FINDINGS:  Heart: The heart is normal in size with no pericardial effusion or calcification.  Mitral valve annulus calcification.  Coronary artery calcification.    Lungs: The lung bases are clear without evidence for consolidation, or pneumothorax.  Trace bilateral pleural effusions, as well as mild pleural thickening along the lung bases with punctate calcifications along the right lung base pleural margin (axial series 2, image 24).  Centrilobular  emphysematous changes throughout both lungs.    Liver: The liver is mildly enlarged in size measuring 18.4 cm, and demonstrates homogeneous attenuation.  Small parenchymal calcification along the right peripheral hepatic lobe (axial series 2, image 46).    Gallbladder: The gallbladder is normal in size, and demonstrates layering hyperdense material, which may represent gallbladder sludge.  No gallbladder wall thickening or pericholecystic fluid.    Bile ducts: No evidence of dilated ducts.    Pancreas: No mass or peripancreatic fat stranding.    Spleen: The spleen is normal size with no focal splenic lesions.    Adrenals: Normal.    GI Tract/ Mesentery: No evidence of bowel obstruction or inflammation.  Multiple colonic diverticula without evidence for diverticulitis.  The appendix is visualized without evidence for appendicitis.  Trace inflammatory change surrounding the hepatic flexure (axial series 2, image 81), likely reactive from adjacent right obstructive uropathy.    Kidneys/ Ureters: The kidneys are normal in size and location.  Postoperative change including right partial nephrectomy for resection of renal cell carcinoma.  No abnormality identified in the perirenal fascia or at the site of resection.    The left kidney demonstrates multiple punctate calcific densities, favoring nonobstructing renal stones.  No left-sided hydronephrosis or hydroureter.  No ureteral stones visualized within the left ureter.    There is severe right-sided hydronephrosis and moderate dilatation of the proximal to mid ureter with perinephric stranding and inflammatory change surrounding the course of the visualized ureter.  Additionally, there is a 7 mm well rounded calcific density in the distal right ureter just before the vesicoureteral junction (axial series 2, image 129).  Additional punctate calcific densities along the right renal collecting system, favoring nonobstructing renal stones.    The bladder is normal size with  no wall thickening or mass.    Reproductive organs: Normal.    Retroperitoneum: No significant adenopathy.    Peritoneal space: No ascites or free intra-abdominal air.  No portal venous gas.    Abdominal wall: Normal.    Vasculature: The visualized aorta is normal in course and caliber with moderate calcific atherosclerosis.  No evidence for aneurysm.  Origins of the celiac artery, SMA, left iliac artery, and QUIRINO are unremarkable.  There is anterior displacement of the right renal artery secondary to severe right hydronephrosis.  Few prominent collateral vessels, likely venous, near the aortic bifurcation (axial series 2, image 87), of uncertain clinical significance.    Bones: No evidence for acute fracture or osseous destructive lesion.  Grade 1 anterolisthesis of L4 on L5, slightly more conspicuous when compared to CT 09/19/2017.  Advanced degenerative change at L5-S1, similar when compared to CT 09/19/2017.                               X-Ray Chest AP Portable (Final result)  Result time 04/28/22 13:31:39    Final result by Fritz Walsh DO (04/28/22 13:31:39)                 Impression:      See above      Electronically signed by: Fritz Walsh DO  Date:    04/28/2022  Time:    13:31             Narrative:    EXAMINATION:  XR CHEST AP PORTABLE    CLINICAL HISTORY:  Fever, unspecified    TECHNIQUE:  Single frontal view of the chest was performed.    COMPARISON:  02/24/2022    FINDINGS:  Small lung volumes likely to poor inspiratory effort.  There is ill-defined basilar opacities with slight blunting of the costophrenic angles which may represent atelectasis and scarring small effusions not excluded.  There is no large lung consolidation.  Coarse interstitial opacities in the lungs persist.  Surgical clips left project over the left upper lobe and axilla similar to prior.  Continued atherosclerotic aorta.  Clinical correlation and follow-up advised.                                 Medications   HYDROmorphone  injection 0.2 mg (has no administration in time range)   sodium chloride 0.9% flush 3 mL (has no administration in time range)   acetaminophen tablet 1,000 mg (has no administration in time range)   losartan tablet 25 mg (has no administration in time range)   metoprolol succinate (TOPROL-XL) 24 hr tablet 100 mg (has no administration in time range)   atorvastatin tablet 20 mg (has no administration in time range)   naloxone 0.4 mg/mL injection 0.02 mg (has no administration in time range)   magnesium oxide tablet 800 mg (has no administration in time range)   magnesium oxide tablet 800 mg (has no administration in time range)   glucose chewable tablet 16 g (has no administration in time range)   glucose chewable tablet 24 g (has no administration in time range)   glucagon (human recombinant) injection 1 mg (has no administration in time range)   dextrose 10% bolus 125 mL (has no administration in time range)   dextrose 10% bolus 250 mL (has no administration in time range)   heparin (porcine) injection 5,000 Units (has no administration in time range)   morphine injection 2 mg (has no administration in time range)   morphine injection 4 mg (has no administration in time range)   oxybutynin tablet 5 mg (has no administration in time range)   piperacillin-tazobactam 4.5 g in sodium chloride 0.9% 100 mL IVPB (ready to mix system) (has no administration in time range)   piperacillin-tazobactam 4.5 g in sodium chloride 0.9% 100 mL IVPB (ready to mix system) (0 g Intravenous Stopped 4/28/22 1535)   lactated ringers bolus 500 mL (0 mLs Intravenous Stopped 4/28/22 1557)     Medical Decision Making:   History:   Old Medical Records: I decided to obtain old medical records.  Old Records Summarized: records from clinic visits and records from previous admission(s).  Clinical Tests:   Lab Tests: Ordered and Reviewed  Radiological Study: Ordered and Reviewed  Other:   I have discussed this case with another health care  provider.       <> Summary of the Discussion: Hospital Medicine       APC / Resident Notes:   76 y.o. female with medical history of COPD, CRF on O2, Diastolic HF (EF 65%), Parox AFib on Eliquis, HTN, HLD, CKD, Hx of Breast cancer s/p L mastectomy presenting to the ED c/o 5 days of hematuria with chills. Febrile 101.5 on ED arrival.     DDx includes but not limited to UTI, hemorrhagic cystitis, pyelonephritis, nephrolithiasis, medication side effect, malignancy, symptomatic anemia.         ED Course as of 04/28/22 1844   Thu Apr 28, 2022   1433 Urology consulted regarding CT finding and concerns for septic nephrolithiasis. Zosyn and IVF ordered.  [BA]   1842 Creatinine(!): 2.0  MARQUEZ [BA]   1842 Lactate, Jameel: 1.2 [BA]   1842 Discussed with HM who will admit for further management. Urology planning for stent placement. Patient expresses understanding and agreeable to the plan. Return to ED precautions given for new, worsening, or concerning symptoms.  [BA]      ED Course User Index  [BA] Guille Hewitt PA-C             Clinical Impression:   Final diagnoses:  [R50.9] Fever  [N39.0, R31.9] Urinary tract infection with hematuria, site unspecified (Primary)  [N20.0] Nephrolithiasis  [N17.9] MARQUEZ (acute kidney injury)          ED Disposition Condition    Admit               Guille Hewitt PA-C  04/28/22 1844

## 2022-04-28 NOTE — HPI
Patient is a 75 yo F with PMH of COPD, CRF on O2, Diastolic HF (EF 65%), Parox AFib on Eliquis, HTN, HLD, CKD, Hx of Breast cancer s/p L mastectomy presenting to the ED with the chief complaint of gross hematuria. She has had hematuria, decreased UOP, and chills for a few days. Today she developed fever and chills and came to the ED. She denies flank pain, suprapubic pain, dysuria. She has a history of nephrolithiasis requiring both ESWL and URS in the past.     CT RSS obtained in the ED shows severe right hydroureteronephrosis to the level of a 1.3cm right distal ureteral stone. WBC 7.7. Cr 2.0 (BL 1.2). UA nitrite negative, >100 RBC, 21 WBC, no bacteria. Febrile to 101.5 in the ED. She last ate at 615 am. She last drank water an hour ago.

## 2022-04-28 NOTE — SUBJECTIVE & OBJECTIVE
Past Medical History:   Diagnosis Date    Acute hypoxemic respiratory failure 6/26/2021    Aortic atherosclerosis     Arthritis     knee joint pain    Breast cancer 2002    left breast & lymph nodes-s/p sx with chemo    Bronchitis     with flu-2/2014    Cataracts, bilateral     Chronic diastolic heart failure 12/24/2019    Chronic kidney disease, stage 3     History of chemotherapy     last treatment 12/2002 (had 8 treatments)    Hyperlipidemia 11/20/2016    Hypertension     Osteoporosis     Paroxysmal atrial fibrillation 6/7/2021    Renal calculi     hematuria    Vitamin D insufficiency        Past Surgical History:   Procedure Laterality Date    BREAST BIOPSY Left 2002    core bx, +    BREAST BIOPSY Right 2018    core    BREAST BIOPSY Right 2019    BREAST LUMPECTOMY Left 2002    LITHOTRIPSY      MASTECTOMY Left 06/2002    left-& lymph node dissection    ROBOT-ASSISTED LAPAROSCOPIC PARTIAL NEPHRECTOMY USING DA JESÚS XI Right 3/11/2021    Procedure: XI ROBOTIC NEPHRECTOMY, PARTIAL;  Surgeon: Taz Ortega MD;  Location: [facility] OR [address] [place] [address];  Service: Urology;  Laterality: Right;  4hr/ gen with regional  [place] confirmation:  [id number] for 11:15am case NC    ureteroscopy Bilateral     6.14       Review of patient's allergies indicates:   Allergen Reactions    Adhesive Rash     Pt states she removed a LATEX bandaid and the skin beneath was swollen and red. No other latex causes a reaction.       No current facility-administered medications on file prior to encounter.     Current Outpatient Medications on File Prior to Encounter   Medication Sig    acetaminophen (TYLENOL) 500 MG tablet Take 2 tablets (1,000 mg total) by mouth every 8 (eight) hours as needed for Pain.    albuterol (ACCUNEB) 0.63 mg/3 mL Nebu Take 3 mLs (0.63 mg total) by nebulization every 6 (six) hours as needed. Rescue    apixaban (ELIQUIS) 5 mg Tab Take 5 mg by mouth once daily.    aspirin (ECOTRIN) 81 MG EC tablet Take  81 mg by mouth once daily.    biotin 1 mg tablet Take 1,000 mcg by mouth 3 (three) times daily.    budesonide-glycopyr-formoterol (BREZTRI AEROSPHERE) 160-9-4.8 mcg/actuation HFAA Inhale 2 puffs into the lungs 2 (two) times daily.    fish oil-omega-3 fatty acids 300-1,000 mg capsule Take 2 g by mouth once daily.    furosemide (LASIX) 20 MG tablet Take 1 tablet by mouth daily If weight gain 2 to3 lbs for 2 to 5 days. If no improvement, call MD (Patient taking differently: Take 1 tablet by mouth daily If weight gain 2 to3 lbs for 2 to 5 days. If no improvement, call MD)    inhalation spacing device Use as directed for inhalation.    losartan (COZAAR) 25 MG tablet Take 1 tablet (25 mg total) by mouth once daily.    metoprolol succinate (TOPROL-XL) 100 MG 24 hr tablet Take 1 tablet (100 mg total) by mouth once daily.    ondansetron (ZOFRAN) 4 MG tablet Take 1 tablet (4 mg total) by mouth every 8 (eight) hours as needed for Nausea.    rosuvastatin (CRESTOR) 5 MG tablet Take 1 tablet (5 mg total) by mouth once daily.    vitamin D (VITAMIN D3) 1000 units Tab TAKE 2 TABLETS ONE TIME DAILY    [DISCONTINUED] rosuvastatin (CRESTOR) 5 MG tablet Take 1 tablet (5 mg total) by mouth once daily.     Family History       Problem Relation (Age of Onset)    Arthritis Mother, Sister    Bone cancer Mother    Breast cancer Mother, Sister    Cancer Father    Crohn's disease Daughter    Fibroids Daughter    No Known Problems Brother, Daughter          Tobacco Use    Smoking status: Former Smoker     Packs/day: 1.00     Years: 50.00     Pack years: 50.00     Types: Cigarettes     Start date:      Quit date: 2009     Years since quittin.9    Smokeless tobacco: Never Used   Substance and Sexual Activity    Alcohol use: No    Drug use: No    Sexual activity: Not Currently     Partners: Male     Birth control/protection: Partner-Vasectomy     Review of Systems   Constitutional:  Positive for fever. Negative for  activity change, appetite change, chills, diaphoresis and fatigue.   HENT:  Negative for congestion, sinus pressure, sore throat and tinnitus.    Eyes:  Negative for visual disturbance.   Respiratory:  Negative for cough, chest tightness, shortness of breath and wheezing.    Cardiovascular:  Negative for chest pain, palpitations and leg swelling.   Gastrointestinal:  Negative for abdominal distention, abdominal pain, nausea and vomiting.   Endocrine: Negative for polydipsia, polyphagia and polyuria.   Genitourinary:  Positive for hematuria. Negative for dysuria.   Musculoskeletal:  Negative for arthralgias and back pain.   Skin:  Negative for rash and wound.   Neurological:  Negative for dizziness, syncope, light-headedness and headaches.   Psychiatric/Behavioral:  Negative for confusion.    Objective:     Vital Signs (Most Recent):  Temp: 97.7 °F (36.5 °C) (04/28/22 1715)  Pulse: 85 (04/28/22 1715)  Resp: 19 (04/28/22 1715)  BP: 136/64 (04/28/22 1715)  SpO2: 99 % (04/28/22 1715) Vital Signs (24h Range):  Temp:  [97.7 °F (36.5 °C)-101.5 °F (38.6 °C)] 97.7 °F (36.5 °C)  Pulse:  [84-85] 85  Resp:  [18-19] 19  SpO2:  [92 %-99 %] 99 %  BP: (136-147)/(64-65) 136/64     Weight: 75.8 kg (167 lb)  Body mass index is 28.67 kg/m².    Physical Exam  Vitals and nursing note reviewed.   Constitutional:       General: She is not in acute distress.     Appearance: Normal appearance. She is well-developed. She is not ill-appearing or toxic-appearing.   HENT:      Head: Normocephalic and atraumatic.      Right Ear: External ear normal.      Left Ear: External ear normal.      Nose: Nose normal.      Mouth/Throat:      Pharynx: Uvula midline.   Eyes:      General: Lids are normal.      Extraocular Movements: EOM normal.      Conjunctiva/sclera: Conjunctivae normal.      Pupils: Pupils are equal, round, and reactive to light.   Neck:      Thyroid: No thyroid mass.      Vascular: No JVD.      Trachea: Trachea normal.   Cardiovascular:       Rate and Rhythm: Normal rate and regular rhythm.      Heart sounds: Normal heart sounds, S1 normal and S2 normal.   Pulmonary:      Effort: Pulmonary effort is normal.      Breath sounds: Normal breath sounds.   Abdominal:      General: Bowel sounds are normal. There is no distension.      Palpations: Abdomen is soft.      Tenderness: There is no abdominal tenderness.   Genitourinary:     Comments: Muniz in place with hematuria  Musculoskeletal:      Cervical back: Full passive range of motion without pain, normal range of motion and neck supple.      Right lower leg: No edema.      Left lower leg: No edema.   Neurological:      Mental Status: She is alert.      Cranial Nerves: No cranial nerve deficit.      Sensory: No sensory deficit.   Psychiatric:         Speech: Speech normal.         Behavior: Behavior normal. Behavior is cooperative.         Thought Content: Thought content normal.         CRANIAL NERVES     CN III, IV, VI   Pupils are equal, round, and reactive to light.  Extraocular motions are normal.      Significant Labs: All pertinent labs within the past 24 hours have been reviewed.  CBC:   Recent Labs   Lab 04/28/22  1312   WBC 7.74   HGB 15.1   HCT 45.8        CMP:   Recent Labs   Lab 04/28/22  1312      K 4.8      CO2 25   *   BUN 26*   CREATININE 2.0*   CALCIUM 10.1   PROT 7.1   ALBUMIN 3.2*   BILITOT 2.0*   ALKPHOS 83   AST 46*   ALT 26   ANIONGAP 11   EGFRNONAA 23.4*     Cardiac Markers: No results for input(s): CKMB, MYOGLOBIN, BNP, TROPISTAT in the last 48 hours.  Lactic Acid:   Recent Labs   Lab 04/28/22  1449   LACTATE 1.2       Significant Imaging: I have reviewed all pertinent imaging results/findings within the past 24 hours.

## 2022-04-29 LAB
ALBUMIN SERPL BCP-MCNC: 2.4 G/DL (ref 3.5–5.2)
ALP SERPL-CCNC: 69 U/L (ref 55–135)
ALT SERPL W/O P-5'-P-CCNC: 30 U/L (ref 10–44)
ANION GAP SERPL CALC-SCNC: 10 MMOL/L (ref 8–16)
AST SERPL-CCNC: 50 U/L (ref 10–40)
BASOPHILS # BLD AUTO: 0.04 K/UL (ref 0–0.2)
BASOPHILS NFR BLD: 0.9 % (ref 0–1.9)
BILIRUB SERPL-MCNC: 1.7 MG/DL (ref 0.1–1)
BUN SERPL-MCNC: 32 MG/DL (ref 8–23)
CALCIUM SERPL-MCNC: 9.2 MG/DL (ref 8.7–10.5)
CHLORIDE SERPL-SCNC: 101 MMOL/L (ref 95–110)
CO2 SERPL-SCNC: 28 MMOL/L (ref 23–29)
CREAT SERPL-MCNC: 2.1 MG/DL (ref 0.5–1.4)
DIFFERENTIAL METHOD: ABNORMAL
EOSINOPHIL # BLD AUTO: 0.1 K/UL (ref 0–0.5)
EOSINOPHIL NFR BLD: 1.8 % (ref 0–8)
ERYTHROCYTE [DISTWIDTH] IN BLOOD BY AUTOMATED COUNT: 13.9 % (ref 11.5–14.5)
EST. GFR  (AFRICAN AMERICAN): 25.4 ML/MIN/1.73 M^2
EST. GFR  (NON AFRICAN AMERICAN): 22.1 ML/MIN/1.73 M^2
GLUCOSE SERPL-MCNC: 129 MG/DL (ref 70–110)
HCT VFR BLD AUTO: 39.7 % (ref 37–48.5)
HGB BLD-MCNC: 12.7 G/DL (ref 12–16)
IMM GRANULOCYTES # BLD AUTO: 0.01 K/UL (ref 0–0.04)
IMM GRANULOCYTES NFR BLD AUTO: 0.2 % (ref 0–0.5)
LYMPHOCYTES # BLD AUTO: 0.6 K/UL (ref 1–4.8)
LYMPHOCYTES NFR BLD: 13.4 % (ref 18–48)
MCH RBC QN AUTO: 30.1 PG (ref 27–31)
MCHC RBC AUTO-ENTMCNC: 32 G/DL (ref 32–36)
MCV RBC AUTO: 94 FL (ref 82–98)
MONOCYTES # BLD AUTO: 0.3 K/UL (ref 0.3–1)
MONOCYTES NFR BLD: 7.7 % (ref 4–15)
NEUTROPHILS # BLD AUTO: 3.4 K/UL (ref 1.8–7.7)
NEUTROPHILS NFR BLD: 76 % (ref 38–73)
NRBC BLD-RTO: 0 /100 WBC
PLATELET # BLD AUTO: 141 K/UL (ref 150–450)
PMV BLD AUTO: 9.9 FL (ref 9.2–12.9)
POTASSIUM SERPL-SCNC: 3.9 MMOL/L (ref 3.5–5.1)
PROT SERPL-MCNC: 5.4 G/DL (ref 6–8.4)
RBC # BLD AUTO: 4.22 M/UL (ref 4–5.4)
SODIUM SERPL-SCNC: 139 MMOL/L (ref 136–145)
WBC # BLD AUTO: 4.41 K/UL (ref 3.9–12.7)

## 2022-04-29 PROCEDURE — 80053 COMPREHEN METABOLIC PANEL: CPT | Performed by: FAMILY MEDICINE

## 2022-04-29 PROCEDURE — 63600175 PHARM REV CODE 636 W HCPCS: Performed by: FAMILY MEDICINE

## 2022-04-29 PROCEDURE — 99232 PR SUBSEQUENT HOSPITAL CARE,LEVL II: ICD-10-PCS | Mod: ,,, | Performed by: FAMILY MEDICINE

## 2022-04-29 PROCEDURE — 99232 SBSQ HOSP IP/OBS MODERATE 35: CPT | Mod: ,,, | Performed by: FAMILY MEDICINE

## 2022-04-29 PROCEDURE — 27000221 HC OXYGEN, UP TO 24 HOURS

## 2022-04-29 PROCEDURE — 99900035 HC TECH TIME PER 15 MIN (STAT)

## 2022-04-29 PROCEDURE — 85025 COMPLETE CBC W/AUTO DIFF WBC: CPT | Performed by: FAMILY MEDICINE

## 2022-04-29 PROCEDURE — 36415 COLL VENOUS BLD VENIPUNCTURE: CPT | Performed by: FAMILY MEDICINE

## 2022-04-29 PROCEDURE — 94761 N-INVAS EAR/PLS OXIMETRY MLT: CPT

## 2022-04-29 PROCEDURE — 12000002 HC ACUTE/MED SURGE SEMI-PRIVATE ROOM

## 2022-04-29 PROCEDURE — 25000003 PHARM REV CODE 250: Performed by: FAMILY MEDICINE

## 2022-04-29 PROCEDURE — 51798 US URINE CAPACITY MEASURE: CPT

## 2022-04-29 RX ORDER — FLUTICASONE FUROATE AND VILANTEROL 100; 25 UG/1; UG/1
1 POWDER RESPIRATORY (INHALATION) DAILY
Status: DISCONTINUED | OUTPATIENT
Start: 2022-04-29 | End: 2022-05-06 | Stop reason: HOSPADM

## 2022-04-29 RX ORDER — MUPIROCIN 20 MG/G
OINTMENT TOPICAL 2 TIMES DAILY
Status: DISPENSED | OUTPATIENT
Start: 2022-04-29 | End: 2022-05-04

## 2022-04-29 RX ORDER — ASPIRIN 81 MG/1
81 TABLET ORAL DAILY
Status: DISCONTINUED | OUTPATIENT
Start: 2022-04-29 | End: 2022-05-06 | Stop reason: HOSPADM

## 2022-04-29 RX ADMIN — ASPIRIN 81 MG: 81 TABLET, COATED ORAL at 11:04

## 2022-04-29 RX ADMIN — MUPIROCIN: 20 OINTMENT TOPICAL at 11:04

## 2022-04-29 RX ADMIN — ACETAMINOPHEN 1000 MG: 500 TABLET ORAL at 07:04

## 2022-04-29 RX ADMIN — OXYBUTYNIN CHLORIDE 5 MG: 5 TABLET ORAL at 02:04

## 2022-04-29 RX ADMIN — APIXABAN 2.5 MG: 2.5 TABLET, FILM COATED ORAL at 02:04

## 2022-04-29 RX ADMIN — APIXABAN 2.5 MG: 2.5 TABLET, FILM COATED ORAL at 08:04

## 2022-04-29 RX ADMIN — OXYBUTYNIN CHLORIDE 5 MG: 5 TABLET ORAL at 08:04

## 2022-04-29 RX ADMIN — METOPROLOL SUCCINATE 100 MG: 50 TABLET, EXTENDED RELEASE ORAL at 08:04

## 2022-04-29 RX ADMIN — HEPARIN SODIUM 5000 UNITS: 5000 INJECTION INTRAVENOUS; SUBCUTANEOUS at 06:04

## 2022-04-29 RX ADMIN — PIPERACILLIN SODIUM AND TAZOBACTAM SODIUM 4.5 G: 4; .5 INJECTION, POWDER, LYOPHILIZED, FOR SOLUTION INTRAVENOUS at 01:04

## 2022-04-29 RX ADMIN — PIPERACILLIN SODIUM AND TAZOBACTAM SODIUM 4.5 G: 4; .5 INJECTION, POWDER, LYOPHILIZED, FOR SOLUTION INTRAVENOUS at 05:04

## 2022-04-29 RX ADMIN — ATORVASTATIN CALCIUM 20 MG: 20 TABLET, FILM COATED ORAL at 08:04

## 2022-04-29 RX ADMIN — MUPIROCIN: 20 OINTMENT TOPICAL at 09:04

## 2022-04-29 RX ADMIN — PIPERACILLIN SODIUM AND TAZOBACTAM SODIUM 4.5 G: 4; .5 INJECTION, POWDER, LYOPHILIZED, FOR SOLUTION INTRAVENOUS at 08:04

## 2022-04-29 NOTE — PLAN OF CARE
[facility] ([place] [address])  Initial Discharge Assessment       Primary Care Provider: Celia Carlisle MD    Admission Diagnosis: Ureteral stone [N20.1]  Fever [R50.9]  Right ureteral stone [N20.1]  Urinary tract infection with hematuria, site unspecified [N39.0, R31.9]    Admission Date: 4/28/2022  Expected Discharge Date:     Discharge Barriers Identified: (P) None    Payor: [employer] MANAGED MEDICARE / Plan: [employer] MEDICARE HMO / Product Type: Capitation /     Extended Emergency Contact Information  Primary Emergency Contact: Esther Barajas  Mobile Phone: [phone]  Relation: Daughter  Secondary Emergency Contact: Marina Gruber  Address: [address] [place] [address]  Home Phone: [phone]  Mobile Phone: [phone]  Relation: Daughter    Discharge Plan A: (P) Home  Discharge Plan B: (P) Home Health      [facility] [address] [place] [address] [address] [address]  Phone: [phone] Fax: [phone]    [facility] Mail Delivery - [facility] [address] [facility]  [address] [facility] [address]  Phone: [phone] Fax: [phone]    [facility]/Pick[facility]  [address] [facility] [address]  [facility] [address]  Phone: [phone] Fax: [phone]      Initial Assessment (most recent)       Adult Discharge Assessment - 04/29/22 1230          Discharge Assessment    Assessment Type Discharge Planning Assessment     Confirmed/corrected address, phone number and insurance Yes     Confirmed Demographics Correct on Facesheet     Source of Information patient     Communicated NOHEMI with patient/caregiver Yes     Reason For Admission Right Ureteral Stone (P)      Lives With alone (P)      Facility Arrived From: Home (P)      Do you expect to return to your current living situation? Yes (P)      Do you have help at home or someone to help you manage your care at home? Yes (P)      Who are your caregiver(s) and their  phone number(s)? Esther (Daughter) - [phone]; Marina (Daughter) - [phone] (P)      Prior to hospitilization cognitive status: Alert/Oriented (P)      Current cognitive status: Alert/Oriented (P)      Walking or Climbing Stairs Difficulty none (P)      Dressing/Bathing Difficulty none (P)      Home Layout Able to live on 1st floor (P)      Equipment Currently Used at Home oxygen (P)      Readmission within 30 days? No (P)      Patient currently being followed by outpatient case management? Yes (P)      If yes, name of outpatient case management following: other (comments) (P)    NP, but patient is unsure who providwed the NP that come out once a month.    Do you currently have service(s) that help you manage your care at home? No (P)      Do you take prescription medications? Yes (P)      Do you have prescription coverage? Yes (P)      Coverage [employer] (P)      Do you have any problems affording any of your prescribed medications? No (P)      Is the patient taking medications as prescribed? yes (P)      Who is going to help you get home at discharge? Esther (Daughter); Marina (Daughter) (P)      How do you get to doctors appointments? car, drives self;family or friend will provide (P)      Are you on dialysis? No (P)      Do you take coumadin? No (P)      Discharge Plan A Home (P)      Discharge Plan B Home Health (P)      DME Needed Upon Discharge  other (see comments) (P)    TBD    Discharge Plan discussed with: Patient (P)      Discharge Barriers Identified None (P)                    SW completed Discharge Planning Assessment with patient. Patient verified information on Facesheet. Patient was calm and cooperative during d/c planning assessment. Patient lives at home alone. Patient lives in a single story home with no steps to enter the home. Patient has transportation home upon d/c. Patient does not attend a Coumadin Clinic and is not on dialysis. Patient requested Bedside Delivery  upon d/c. SW/CM to follow and assist with d/c needs as needed.    Connie Eduardo LMSW   - [facility]  Ext. [phone]

## 2022-04-29 NOTE — NURSING
Muniz removed at this time, no trama noted. See flow sheet for urine assessment. Limb alert placed to left arm at this time. Pt resting comfortably. Will continue to monitor.

## 2022-04-29 NOTE — PROGRESS NOTES
[facility] ([address] Medicine  Progress Note    Patient Name: Misa Barajas  MRN: [id number]  Patient Class: IP- Inpatient   Admission Date: 4/28/2022  Length of Stay: 1 days  Attending Physician: Celina Pereira MD  Primary Care Provider: Celia Carlisle MD      Subjective:     Principal Problem:Right ureteral stone      HPI:  Misa Barajas is a 76 y.o. female with medical history of COPD, CRF on O2, Diastolic HF (EF 65%), Parox AFib on Eliquis, HTN, HLD, CKD, Hx of Breast cancer s/p L mastectomy who was admitted to the hospital for an infected kidney stone. Patient reports 5 days of hematuria with chills. No reported fever. Concerned she is having a side effect of her Eliquis. Denies abdominal pain, flank pain, dysuria, diarrhea, constipation.   CT RSS obtained in the ED showed severe right hydroureteronephrosis to the level of a 1.3cm right distal ureteral stone. WBC 7.7. Cr 2.0 (BL 1.2). UA nitrite negative, >100 RBC, 21 WBC, no bacteria. Febrile to 101.5 in the ED. Urology was consulted. She was taken to the OR for class A cystoscopy with right ureteral stent placement.  She was seen in the PACU and has no current pain.       Interval History: NAEON. Doing well. No acute concerns.     Review of Systems   Constitutional:  Negative for chills and fever.   Respiratory:  Negative for shortness of breath.    Cardiovascular:  Negative for chest pain.   Gastrointestinal:  Negative for abdominal pain.   Genitourinary:  Negative for dysuria.   Neurological:  Negative for headaches.   Psychiatric/Behavioral:  Negative for confusion.      Objective:     Vital Signs (Most Recent):  Temp: 98.3 °F (36.8 °C) (04/29/22 1229)  Pulse: 63 (04/29/22 1229)  Resp: 18 (04/29/22 1229)  BP: (!) 115/58 (04/29/22 1229)  SpO2: (!) 91 % (04/29/22 1229)   Vital Signs (24h Range):  Temp:  [98.3 °F (36.8 °C)-102.8 °F (39.3 °C)] 98.3 °F (36.8 °C)  Pulse:  [63-85] 63  Resp:  [15-19] 18  SpO2:  [91 %-96 %]  91 %  BP: (104-158)/(53-68) 115/58     Weight: 75.8 kg (167 lb)  Body mass index is 28.67 kg/m².    Intake/Output Summary (Last 24 hours) at 4/29/2022 1751  Last data filed at 4/29/2022 1500  Gross per 24 hour   Intake --   Output 1740 ml   Net -1740 ml      Physical Exam  Vitals and nursing note reviewed.   Constitutional:       General: She is not in acute distress.     Appearance: She is well-developed.   HENT:      Head: Normocephalic and atraumatic.   Eyes:      Conjunctiva/sclera: Conjunctivae normal.   Neck:      Vascular: No JVD.   Cardiovascular:      Rate and Rhythm: Normal rate and regular rhythm.      Heart sounds: Normal heart sounds.   Pulmonary:      Effort: Pulmonary effort is normal.      Breath sounds: Normal breath sounds.   Abdominal:      General: Bowel sounds are normal. There is no distension.      Palpations: Abdomen is soft.   Musculoskeletal:      Cervical back: Neck supple.      Right lower leg: No edema.      Left lower leg: No edema.   Neurological:      Mental Status: She is alert.   Psychiatric:         Behavior: Behavior normal.       Significant Labs: All pertinent labs within the past 24 hours have been reviewed.  CBC:   Recent Labs   Lab 04/28/22  1312 04/29/22  1408   WBC 7.74 4.41   HGB 15.1 12.7   HCT 45.8 39.7    141*     CMP:   Recent Labs   Lab 04/28/22  1312 04/29/22  1408    139   K 4.8 3.9    101   CO2 25 28   * 129*   BUN 26* 32*   CREATININE 2.0* 2.1*   CALCIUM 10.1 9.2   PROT 7.1 5.4*   ALBUMIN 3.2* 2.4*   BILITOT 2.0* 1.7*   ALKPHOS 83 69   AST 46* 50*   ALT 26 30   ANIONGAP 11 10   EGFRNONAA 23.4* 22.1*     Urine Culture: No results for input(s): LABURIN in the last 48 hours.    Significant Imaging: I have reviewed all pertinent imaging results/findings within the past 24 hours.      Assessment/Plan:      R renal stone  R hydronephrosis and hydroureter  Pyelonephritis   - s/p stent placement. Continue IV abx for pyuria and pyonephrosis seen  during procedure. Ucx pending. Continue anti pyretics prn. Oxybutynin for spasms.   Per uro:   - Recommend removing Muniz catheter this morning.  - Voiding trial today.  - Agree with antibiotics, tailor as culture sensitivities result.  - F/u urine cultures.  - Patient will need to be on culture appropriate antibiotics for 2 weeks.  - We will set up outpatient ureteroscopy in a couple weeks for definitive stone management.        MARQUEZ on CKD III  Secondary to obstructive stone.  Renally dose meds for Estimated Creatinine Clearance: 22 mL/min (A) (based on SCr of 2.1 mg/dL (H)).         COPD  Chronic respiratory failure secondary to hypoxia  - only uses her O2 prn at home. Will monitor  - Breo and prn inh tx     Chronic diastolic CHF  Stable. Continue home meds. Monitor fluids.      PAF  Stable. Continue BB and Eliquis.      HTN  Stable. Monitor in setting of systemic infection.    VTE Risk Mitigation (From admission, onward)         Ordered     apixaban tablet 2.5 mg  2 times daily         04/29/22 1209     IP VTE HIGH RISK PATIENT  Once         04/28/22 1723     Place sequential compression device  Until discontinued         04/28/22 1723                Discharge Planning   NOHEMI: 5/2/2022     Code Status: Full Code   Is the patient medically ready for discharge?:     Reason for patient still in hospital (select all that apply): Patient trending condition and Treatment  Discharge Plan A: Home          Celina Pereira MD  Department of Hospital Medicine   [facility] - Intensive Care ([place]-[facility])

## 2022-04-29 NOTE — NURSING
Report received from PACU , pt arrived to the via stretcher, Temp 102.58 pt given tylenol MD paged awaiting response.

## 2022-04-29 NOTE — SUBJECTIVE & OBJECTIVE
Interval History: NAEON. Doing well. No acute concerns.     Review of Systems   Constitutional:  Negative for chills and fever.   Respiratory:  Negative for shortness of breath.    Cardiovascular:  Negative for chest pain.   Gastrointestinal:  Negative for abdominal pain.   Genitourinary:  Negative for dysuria.   Neurological:  Negative for headaches.   Psychiatric/Behavioral:  Negative for confusion.      Objective:     Vital Signs (Most Recent):  Temp: 98.3 °F (36.8 °C) (04/29/22 1229)  Pulse: 63 (04/29/22 1229)  Resp: 18 (04/29/22 1229)  BP: (!) 115/58 (04/29/22 1229)  SpO2: (!) 91 % (04/29/22 1229)   Vital Signs (24h Range):  Temp:  [98.3 °F (36.8 °C)-102.8 °F (39.3 °C)] 98.3 °F (36.8 °C)  Pulse:  [63-85] 63  Resp:  [15-19] 18  SpO2:  [91 %-96 %] 91 %  BP: (104-158)/(53-68) 115/58     Weight: 75.8 kg (167 lb)  Body mass index is 28.67 kg/m².    Intake/Output Summary (Last 24 hours) at 4/29/2022 1751  Last data filed at 4/29/2022 1500  Gross per 24 hour   Intake --   Output 1740 ml   Net -1740 ml      Physical Exam  Vitals and nursing note reviewed.   Constitutional:       General: She is not in acute distress.     Appearance: She is well-developed.   HENT:      Head: Normocephalic and atraumatic.   Eyes:      Conjunctiva/sclera: Conjunctivae normal.   Neck:      Vascular: No JVD.   Cardiovascular:      Rate and Rhythm: Normal rate and regular rhythm.      Heart sounds: Normal heart sounds.   Pulmonary:      Effort: Pulmonary effort is normal.      Breath sounds: Normal breath sounds.   Abdominal:      General: Bowel sounds are normal. There is no distension.      Palpations: Abdomen is soft.   Musculoskeletal:      Cervical back: Neck supple.      Right lower leg: No edema.      Left lower leg: No edema.   Neurological:      Mental Status: She is alert.   Psychiatric:         Behavior: Behavior normal.       Significant Labs: All pertinent labs within the past 24 hours have been reviewed.  CBC:   Recent Labs    Lab 04/28/22  1312 04/29/22  1408   WBC 7.74 4.41   HGB 15.1 12.7   HCT 45.8 39.7    141*     CMP:   Recent Labs   Lab 04/28/22  1312 04/29/22  1408    139   K 4.8 3.9    101   CO2 25 28   * 129*   BUN 26* 32*   CREATININE 2.0* 2.1*   CALCIUM 10.1 9.2   PROT 7.1 5.4*   ALBUMIN 3.2* 2.4*   BILITOT 2.0* 1.7*   ALKPHOS 83 69   AST 46* 50*   ALT 26 30   ANIONGAP 11 10   EGFRNONAA 23.4* 22.1*     Urine Culture: No results for input(s): LABURIN in the last 48 hours.    Significant Imaging: I have reviewed all pertinent imaging results/findings within the past 24 hours.

## 2022-04-29 NOTE — SUBJECTIVE & OBJECTIVE
Interval History: NAEON. Febrile to 102 at 2000 overnight. Otherwise doing well. Muniz draining well. Labs pending.      Objective:     Temp:  [97.7 °F (36.5 °C)-102.8 °F (39.3 °C)] 99.3 °F (37.4 °C)  Pulse:  [70-85] 70  Resp:  [15-19] 16  SpO2:  [92 %-99 %] 94 %  BP: (106-158)/(55-68) 145/63     Body mass index is 28.67 kg/m².           Drains       Drain  Duration                  Urethral Catheter 04/28/22  1 day                    Physical Exam  Constitutional:       General: She is not in acute distress.     Appearance: She is not diaphoretic.   HENT:      Head: Normocephalic and atraumatic.      Nose: Nose normal.   Eyes:      Conjunctiva/sclera: Conjunctivae normal.   Cardiovascular:      Rate and Rhythm: Normal rate.   Pulmonary:      Effort: Pulmonary effort is normal. No respiratory distress.   Abdominal:      General: There is no distension.   Musculoskeletal:         General: Normal range of motion.      Cervical back: Normal range of motion.   Skin:     General: Skin is dry.   Neurological:      Mental Status: She is alert.   Psychiatric:         Behavior: Behavior normal.         Thought Content: Thought content normal.       Significant Labs:    BMP:  Recent Labs   Lab 04/28/22  1312      K 4.8      CO2 25   BUN 26*   CREATININE 2.0*   CALCIUM 10.1       CBC:   Recent Labs   Lab 04/28/22  1312   WBC 7.74   HGB 15.1   HCT 45.8          All pertinent labs results from the past 24 hours have been reviewed.    Significant Imaging:  All pertinent imaging results/findings from the past 24 hours have been reviewed.

## 2022-04-29 NOTE — ASSESSMENT & PLAN NOTE
75 yo F with febrile right ureteral stone and MARQUEZ.    - S/p cystoscopy with right ureteral stent placement on 4/29.   - Recommend removing Muniz catheter this morning.  - Voiding trial today.  - Agree with antibiotics, tailor as culture sensitivities result.  - F/u urine cultures.  - Patient will need to be on culture appropriate antibiotics for 2 weeks.  - We will set up outpatient ureteroscopy in a couple weeks for definitive stone management.

## 2022-04-29 NOTE — ANESTHESIA POSTPROCEDURE EVALUATION
Anesthesia Post Evaluation    Patient: Misa Barajas    Procedure(s) Performed: Procedure(s) (LRB):  CYSTOSCOPY  INSERTION, STENT, URETER (Right)  PYELOGRAM, RETROGRADE (Right)    Final Anesthesia Type: general      Patient location during evaluation: PACU  Patient participation: Yes- Able to Participate  Level of consciousness: awake and alert  Post-procedure vital signs: reviewed and stable  Pain control: Pain has been treated.  Airway patency: patent    PONV status: PONV absent or treated.  Anesthetic complications: no      Cardiovascular status: hemodynamically stable  Respiratory status: spontaneous ventilation  Hydration status: euvolemic  Follow-up not needed.          Vitals Value Taken Time   /53 04/29/22 0855   Temp 37.2 °C (98.9 °F) 04/29/22 0855   Pulse 68 04/29/22 0855   Resp 16 04/29/22 0855   SpO2 96 % 04/29/22 0855         Event Time   Out of Recovery 17:45:00         Pain/Neeraj Score: Pain Rating Prior to Med Admin: 1 (4/29/2022  7:38 AM)  Pain Rating Post Med Admin: 3 (4/29/2022  8:38 AM)  Neeraj Score: 9 (4/28/2022  9:00 PM)

## 2022-04-29 NOTE — PROGRESS NOTES
[facility] - [facility] ([place] [address])  Urology  Progress Note    Patient Name: Misa Barajas  MRN: [id number]  Admission Date: 4/28/2022  Hospital Length of Stay: 1 days  Code Status: Full Code   Attending Provider: Celina Pereira MD   Primary Care Physician: Celia Carlisle MD    Subjective:     HPI:  Patient is a 75 yo F with PMH of COPD, CRF on O2, Diastolic HF (EF 65%), Parox AFib on Eliquis, HTN, HLD, CKD, Hx of Breast cancer s/p L mastectomy presenting to the ED with the chief complaint of gross hematuria. She has had hematuria, decreased UOP, and chills for a few days. Today she developed fever and chills and came to the ED. She denies flank pain, suprapubic pain, dysuria. She has a history of nephrolithiasis requiring both ESWL and URS in the past.     CT RSS obtained in the ED shows severe right hydroureteronephrosis to the level of a 1.3cm right distal ureteral stone. WBC 7.7. Cr 2.0 (BL 1.2). UA nitrite negative, >100 RBC, 21 WBC, no bacteria. Febrile to 101.5 in the ED. She last ate at 615 am. She last drank water an hour ago.      Interval History: NAEON. Febrile to 102 at 2000 overnight. Otherwise doing well. Muniz draining well. Labs pending.      Objective:     Temp:  [97.7 °F (36.5 °C)-102.8 °F (39.3 °C)] 99.3 °F (37.4 °C)  Pulse:  [70-85] 70  Resp:  [15-19] 16  SpO2:  [92 %-99 %] 94 %  BP: (106-158)/(55-68) 145/63     Body mass index is 28.67 kg/m².           Drains       Drain  Duration                  Urethral Catheter 04/28/22  1 day                    Physical Exam  Constitutional:       General: She is not in acute distress.     Appearance: She is not diaphoretic.   HENT:      Head: Normocephalic and atraumatic.      Nose: Nose normal.   Eyes:      Conjunctiva/sclera: Conjunctivae normal.   Cardiovascular:      Rate and Rhythm: Normal rate.   Pulmonary:      Effort: Pulmonary effort is normal. No respiratory distress.   Abdominal:      General: There is no distension.    Musculoskeletal:         General: Normal range of motion.      Cervical back: Normal range of motion.   Skin:     General: Skin is dry.   Neurological:      Mental Status: She is alert.   Psychiatric:         Behavior: Behavior normal.         Thought Content: Thought content normal.       Significant Labs:    BMP:  Recent Labs   Lab 04/28/22  1312      K 4.8      CO2 25   BUN 26*   CREATININE 2.0*   CALCIUM 10.1       CBC:   Recent Labs   Lab 04/28/22  1312   WBC 7.74   HGB 15.1   HCT 45.8          All pertinent labs results from the past 24 hours have been reviewed.    Significant Imaging:  All pertinent imaging results/findings from the past 24 hours have been reviewed.                    Assessment/Plan:     * Right ureteral stone  75 yo F with febrile right ureteral stone and MARQUEZ.    - S/p cystoscopy with right ureteral stent placement on 4/29.   - Recommend removing Muniz catheter this morning.  - Voiding trial today.  - Agree with antibiotics, tailor as culture sensitivities result.  - F/u urine cultures.  - Patient will need to be on culture appropriate antibiotics for 2 weeks.  - We will set up outpatient ureteroscopy in a couple weeks for definitive stone management.        VTE Risk Mitigation (From admission, onward)         Ordered     heparin (porcine) injection 5,000 Units  Every 8 hours         04/28/22 1723     IP VTE HIGH RISK PATIENT  Once         04/28/22 1723     Place sequential compression device  Until discontinued         04/28/22 1723                Kwabena Anand MD  Urology  [facility] - Intensive Care ([place])

## 2022-04-30 LAB
ANION GAP SERPL CALC-SCNC: 12 MMOL/L (ref 8–16)
BACTERIA UR CULT: NO GROWTH
BACTERIA UR CULT: NORMAL
BUN SERPL-MCNC: 32 MG/DL (ref 8–23)
CALCIUM SERPL-MCNC: 9 MG/DL (ref 8.7–10.5)
CHLORIDE SERPL-SCNC: 104 MMOL/L (ref 95–110)
CO2 SERPL-SCNC: 23 MMOL/L (ref 23–29)
CREAT SERPL-MCNC: 2.1 MG/DL (ref 0.5–1.4)
EST. GFR  (AFRICAN AMERICAN): 25.4 ML/MIN/1.73 M^2
EST. GFR  (NON AFRICAN AMERICAN): 22.1 ML/MIN/1.73 M^2
GLUCOSE SERPL-MCNC: 92 MG/DL (ref 70–110)
POTASSIUM SERPL-SCNC: 4 MMOL/L (ref 3.5–5.1)
SODIUM SERPL-SCNC: 139 MMOL/L (ref 136–145)

## 2022-04-30 PROCEDURE — 80048 BASIC METABOLIC PNL TOTAL CA: CPT | Performed by: FAMILY MEDICINE

## 2022-04-30 PROCEDURE — 94761 N-INVAS EAR/PLS OXIMETRY MLT: CPT

## 2022-04-30 PROCEDURE — 12000002 HC ACUTE/MED SURGE SEMI-PRIVATE ROOM

## 2022-04-30 PROCEDURE — 36415 COLL VENOUS BLD VENIPUNCTURE: CPT | Performed by: FAMILY MEDICINE

## 2022-04-30 PROCEDURE — 99900035 HC TECH TIME PER 15 MIN (STAT)

## 2022-04-30 PROCEDURE — 99232 SBSQ HOSP IP/OBS MODERATE 35: CPT | Mod: ,,, | Performed by: FAMILY MEDICINE

## 2022-04-30 PROCEDURE — 99232 PR SUBSEQUENT HOSPITAL CARE,LEVL II: ICD-10-PCS | Mod: ,,, | Performed by: FAMILY MEDICINE

## 2022-04-30 PROCEDURE — 25000003 PHARM REV CODE 250: Performed by: FAMILY MEDICINE

## 2022-04-30 PROCEDURE — 63600175 PHARM REV CODE 636 W HCPCS: Performed by: FAMILY MEDICINE

## 2022-04-30 PROCEDURE — 27000221 HC OXYGEN, UP TO 24 HOURS

## 2022-04-30 RX ADMIN — PIPERACILLIN SODIUM AND TAZOBACTAM SODIUM 4.5 G: 4; .5 INJECTION, POWDER, LYOPHILIZED, FOR SOLUTION INTRAVENOUS at 10:04

## 2022-04-30 RX ADMIN — OXYBUTYNIN CHLORIDE 5 MG: 5 TABLET ORAL at 08:04

## 2022-04-30 RX ADMIN — METOPROLOL SUCCINATE 100 MG: 50 TABLET, EXTENDED RELEASE ORAL at 10:04

## 2022-04-30 RX ADMIN — APIXABAN 2.5 MG: 2.5 TABLET, FILM COATED ORAL at 10:04

## 2022-04-30 RX ADMIN — OXYBUTYNIN CHLORIDE 5 MG: 5 TABLET ORAL at 10:04

## 2022-04-30 RX ADMIN — PIPERACILLIN SODIUM AND TAZOBACTAM SODIUM 4.5 G: 4; .5 INJECTION, POWDER, LYOPHILIZED, FOR SOLUTION INTRAVENOUS at 02:04

## 2022-04-30 RX ADMIN — MUPIROCIN: 20 OINTMENT TOPICAL at 08:04

## 2022-04-30 RX ADMIN — LOSARTAN POTASSIUM 25 MG: 25 TABLET, FILM COATED ORAL at 10:04

## 2022-04-30 RX ADMIN — MUPIROCIN: 20 OINTMENT TOPICAL at 10:04

## 2022-04-30 RX ADMIN — ATORVASTATIN CALCIUM 20 MG: 20 TABLET, FILM COATED ORAL at 08:04

## 2022-04-30 RX ADMIN — ACETAMINOPHEN 1000 MG: 500 TABLET ORAL at 12:04

## 2022-04-30 RX ADMIN — PIPERACILLIN SODIUM AND TAZOBACTAM SODIUM 4.5 G: 4; .5 INJECTION, POWDER, LYOPHILIZED, FOR SOLUTION INTRAVENOUS at 06:04

## 2022-04-30 RX ADMIN — ASPIRIN 81 MG: 81 TABLET, COATED ORAL at 10:04

## 2022-04-30 RX ADMIN — OXYBUTYNIN CHLORIDE 5 MG: 5 TABLET ORAL at 03:04

## 2022-04-30 RX ADMIN — APIXABAN 2.5 MG: 2.5 TABLET, FILM COATED ORAL at 08:04

## 2022-04-30 RX ADMIN — ACETAMINOPHEN 1000 MG: 500 TABLET ORAL at 08:04

## 2022-04-30 NOTE — PROGRESS NOTES
[facility] ([address] Medicine  Progress Note    Patient Name: Misa Barajas  MRN: [id number]  Patient Class: IP- Inpatient   Admission Date: 4/28/2022  Length of Stay: 2 days  Attending Physician: Celina Pereira MD  Primary Care Provider: Celia Carlisle MD      Subjective:     Principal Problem:Right ureteral stone      HPI:  Misa Barajas is a 76 y.o. female with medical history of COPD, CRF on O2, Diastolic HF (EF 65%), Parox AFib on Eliquis, HTN, HLD, CKD, Hx of Breast cancer s/p L mastectomy who was admitted to the hospital for an infected kidney stone. Patient reports 5 days of hematuria with chills. No reported fever. Concerned she is having a side effect of her Eliquis. Denies abdominal pain, flank pain, dysuria, diarrhea, constipation.   CT RSS obtained in the ED showed severe right hydroureteronephrosis to the level of a 1.3cm right distal ureteral stone. WBC 7.7. Cr 2.0 (BL 1.2). UA nitrite negative, >100 RBC, 21 WBC, no bacteria. Febrile to 101.5 in the ED. Urology was consulted. She was taken to the OR for class A cystoscopy with right ureteral stent placement.  She was seen in the PACU and has no current pain.       Interval History: NAEON. Doing well. No acute concerns. Had fever overnight.     Review of Systems   Constitutional:  Positive for fever. Negative for chills.   Respiratory:  Negative for shortness of breath.    Cardiovascular:  Negative for chest pain.   Gastrointestinal:  Negative for abdominal pain.   Genitourinary:  Negative for dysuria.   Neurological:  Negative for headaches.   Psychiatric/Behavioral:  Negative for confusion.      Objective:     Vital Signs (Most Recent):  Temp: 98.2 °F (36.8 °C) (04/30/22 1642)  Pulse: 70 (04/30/22 1642)  Resp: 18 (04/30/22 1642)  BP: (!) 145/77 (04/30/22 1642)  SpO2: (!) 94 % (04/30/22 1642)   Vital Signs (24h Range):  Temp:  [98.2 °F (36.8 °C)-101.6 °F (38.7 °C)] 98.2 °F (36.8 °C)  Pulse:  [62-91] 70  Resp:   [16-20] 18  SpO2:  [93 %-98 %] 94 %  BP: (114-164)/(56-77) 145/77     Weight: 75.8 kg (167 lb)  Body mass index is 28.67 kg/m².    Intake/Output Summary (Last 24 hours) at 4/30/2022 1700  Last data filed at 4/30/2022 1500  Gross per 24 hour   Intake 250 ml   Output 750 ml   Net -500 ml        Physical Exam  Vitals and nursing note reviewed.   Constitutional:       General: She is not in acute distress.     Appearance: She is well-developed.   HENT:      Head: Normocephalic and atraumatic.   Eyes:      Conjunctiva/sclera: Conjunctivae normal.   Neck:      Vascular: No JVD.   Cardiovascular:      Rate and Rhythm: Normal rate and regular rhythm.      Heart sounds: Normal heart sounds.   Pulmonary:      Effort: Pulmonary effort is normal.      Breath sounds: Normal breath sounds.   Abdominal:      General: Bowel sounds are normal. There is no distension.      Palpations: Abdomen is soft.   Musculoskeletal:      Cervical back: Neck supple.      Right lower leg: No edema.      Left lower leg: No edema.   Neurological:      Mental Status: She is alert.   Psychiatric:         Behavior: Behavior normal.       Significant Labs: All pertinent labs within the past 24 hours have been reviewed.  CBC:   Recent Labs   Lab 04/29/22  1408   WBC 4.41   HGB 12.7   HCT 39.7   *       CMP:   Recent Labs   Lab 04/29/22  1408 04/30/22  0526    139   K 3.9 4.0    104   CO2 28 23   * 92   BUN 32* 32*   CREATININE 2.1* 2.1*   CALCIUM 9.2 9.0   PROT 5.4*  --    ALBUMIN 2.4*  --    BILITOT 1.7*  --    ALKPHOS 69  --    AST 50*  --    ALT 30  --    ANIONGAP 10 12   EGFRNONAA 22.1* 22.1*       Urine Culture: No results for input(s): LABURIN in the last 48 hours.    Significant Imaging: I have reviewed all pertinent imaging results/findings within the past 24 hours.      Assessment/Plan:      R renal stone  R hydronephrosis and hydroureter  Pyelonephritis   - s/p stent placement. Continue IV abx for pyuria and  pyonephrosis seen during procedure. Ucx pending. Continue anti pyretics prn. Oxybutynin for spasms.   - urine cultures negative x2.  - Patient will need to be on antibiotics for 2 weeks.  - outpatient ureteroscopy in a couple weeks for definitive stone management.        MARQUEZ on CKD III  Secondary to obstructive stone. No improvement since stent. Renal u/s ordered.   Renally dose meds for Estimated Creatinine Clearance: 22 mL/min (A) (based on SCr of 2.1 mg/dL (H)).         COPD  Chronic respiratory failure secondary to hypoxia  - only uses her O2 prn at home. Will monitor  - Breo and prn inh tx     Chronic diastolic CHF  Stable. Continue home meds. Monitor fluids.      PAF  Stable. Continue BB and Eliquis.      HTN  Stable. Monitor in setting of systemic infection.    VTE Risk Mitigation (From admission, onward)         Ordered     apixaban tablet 2.5 mg  2 times daily         04/29/22 1209     IP VTE HIGH RISK PATIENT  Once         04/28/22 1723     Place sequential compression device  Until discontinued         04/28/22 1723                Discharge Planning   NOHEMI: 5/2/2022     Code Status: Full Code   Is the patient medically ready for discharge?:     Reason for patient still in hospital (select all that apply): Patient trending condition and Treatment  Discharge Plan A: Home          Celina Pereira MD  Department of Hospital Medicine   [facility] - Intensive Care ([place])

## 2022-04-30 NOTE — SUBJECTIVE & OBJECTIVE
Interval History: NAEON. Doing well. No acute concerns. Had fever overnight.     Review of Systems   Constitutional:  Positive for fever. Negative for chills.   Respiratory:  Negative for shortness of breath.    Cardiovascular:  Negative for chest pain.   Gastrointestinal:  Negative for abdominal pain.   Genitourinary:  Negative for dysuria.   Neurological:  Negative for headaches.   Psychiatric/Behavioral:  Negative for confusion.      Objective:     Vital Signs (Most Recent):  Temp: 98.2 °F (36.8 °C) (04/30/22 1642)  Pulse: 70 (04/30/22 1642)  Resp: 18 (04/30/22 1642)  BP: (!) 145/77 (04/30/22 1642)  SpO2: (!) 94 % (04/30/22 1642)   Vital Signs (24h Range):  Temp:  [98.2 °F (36.8 °C)-101.6 °F (38.7 °C)] 98.2 °F (36.8 °C)  Pulse:  [62-91] 70  Resp:  [16-20] 18  SpO2:  [93 %-98 %] 94 %  BP: (114-164)/(56-77) 145/77     Weight: 75.8 kg (167 lb)  Body mass index is 28.67 kg/m².    Intake/Output Summary (Last 24 hours) at 4/30/2022 1700  Last data filed at 4/30/2022 1500  Gross per 24 hour   Intake 250 ml   Output 750 ml   Net -500 ml        Physical Exam  Vitals and nursing note reviewed.   Constitutional:       General: She is not in acute distress.     Appearance: She is well-developed.   HENT:      Head: Normocephalic and atraumatic.   Eyes:      Conjunctiva/sclera: Conjunctivae normal.   Neck:      Vascular: No JVD.   Cardiovascular:      Rate and Rhythm: Normal rate and regular rhythm.      Heart sounds: Normal heart sounds.   Pulmonary:      Effort: Pulmonary effort is normal.      Breath sounds: Normal breath sounds.   Abdominal:      General: Bowel sounds are normal. There is no distension.      Palpations: Abdomen is soft.   Musculoskeletal:      Cervical back: Neck supple.      Right lower leg: No edema.      Left lower leg: No edema.   Neurological:      Mental Status: She is alert.   Psychiatric:         Behavior: Behavior normal.       Significant Labs: All pertinent labs within the past 24 hours have  been reviewed.  CBC:   Recent Labs   Lab 04/29/22  1408   WBC 4.41   HGB 12.7   HCT 39.7   *       CMP:   Recent Labs   Lab 04/29/22  1408 04/30/22  0526    139   K 3.9 4.0    104   CO2 28 23   * 92   BUN 32* 32*   CREATININE 2.1* 2.1*   CALCIUM 9.2 9.0   PROT 5.4*  --    ALBUMIN 2.4*  --    BILITOT 1.7*  --    ALKPHOS 69  --    AST 50*  --    ALT 30  --    ANIONGAP 10 12   EGFRNONAA 22.1* 22.1*       Urine Culture: No results for input(s): LABURIN in the last 48 hours.    Significant Imaging: I have reviewed all pertinent imaging results/findings within the past 24 hours.

## 2022-04-30 NOTE — PLAN OF CARE
Problem: Adult Inpatient Plan of Care  Goal: Patient-Specific Goal (Individualized)  Outcome: Ongoing, Progressing  Goal: Absence of Hospital-Acquired Illness or Injury  Outcome: Ongoing, Progressing  Goal: Optimal Comfort and Wellbeing  Outcome: Ongoing, Progressing  Goal: Readiness for Transition of Care  Outcome: Ongoing, Progressing     Problem: Infection  Goal: Absence of Infection Signs and Symptoms  Outcome: Ongoing, Progressing     Problem: Adult Inpatient Plan of Care  Goal: Plan of Care Review  Outcome: Met

## 2022-05-01 PROBLEM — B49 FUNGEMIA: Status: ACTIVE | Noted: 2022-05-01

## 2022-05-01 LAB
ANION GAP SERPL CALC-SCNC: 12 MMOL/L (ref 8–16)
BUN SERPL-MCNC: 33 MG/DL (ref 8–23)
CALCIUM SERPL-MCNC: 9.2 MG/DL (ref 8.7–10.5)
CHLORIDE SERPL-SCNC: 105 MMOL/L (ref 95–110)
CO2 SERPL-SCNC: 22 MMOL/L (ref 23–29)
CREAT SERPL-MCNC: 2.1 MG/DL (ref 0.5–1.4)
EST. GFR  (AFRICAN AMERICAN): 25.4 ML/MIN/1.73 M^2
EST. GFR  (NON AFRICAN AMERICAN): 22.1 ML/MIN/1.73 M^2
GLUCOSE SERPL-MCNC: 83 MG/DL (ref 70–110)
POTASSIUM SERPL-SCNC: 3.8 MMOL/L (ref 3.5–5.1)
SODIUM SERPL-SCNC: 139 MMOL/L (ref 136–145)

## 2022-05-01 PROCEDURE — 94761 N-INVAS EAR/PLS OXIMETRY MLT: CPT

## 2022-05-01 PROCEDURE — 99223 1ST HOSP IP/OBS HIGH 75: CPT | Mod: GC,,, | Performed by: INTERNAL MEDICINE

## 2022-05-01 PROCEDURE — 99233 SBSQ HOSP IP/OBS HIGH 50: CPT | Mod: ,,, | Performed by: FAMILY MEDICINE

## 2022-05-01 PROCEDURE — 36415 COLL VENOUS BLD VENIPUNCTURE: CPT | Performed by: INTERNAL MEDICINE

## 2022-05-01 PROCEDURE — 12000002 HC ACUTE/MED SURGE SEMI-PRIVATE ROOM

## 2022-05-01 PROCEDURE — 63600175 PHARM REV CODE 636 W HCPCS: Mod: JG | Performed by: INTERNAL MEDICINE

## 2022-05-01 PROCEDURE — 94640 AIRWAY INHALATION TREATMENT: CPT

## 2022-05-01 PROCEDURE — 27000221 HC OXYGEN, UP TO 24 HOURS

## 2022-05-01 PROCEDURE — 99223 PR INITIAL HOSPITAL CARE,LEVL III: ICD-10-PCS | Mod: GC,,, | Performed by: INTERNAL MEDICINE

## 2022-05-01 PROCEDURE — 25000003 PHARM REV CODE 250: Performed by: FAMILY MEDICINE

## 2022-05-01 PROCEDURE — 36415 COLL VENOUS BLD VENIPUNCTURE: CPT | Performed by: FAMILY MEDICINE

## 2022-05-01 PROCEDURE — 63600175 PHARM REV CODE 636 W HCPCS: Performed by: FAMILY MEDICINE

## 2022-05-01 PROCEDURE — 87040 BLOOD CULTURE FOR BACTERIA: CPT | Performed by: INTERNAL MEDICINE

## 2022-05-01 PROCEDURE — 99900035 HC TECH TIME PER 15 MIN (STAT)

## 2022-05-01 PROCEDURE — 25000242 PHARM REV CODE 250 ALT 637 W/ HCPCS: Performed by: FAMILY MEDICINE

## 2022-05-01 PROCEDURE — 80048 BASIC METABOLIC PNL TOTAL CA: CPT | Performed by: FAMILY MEDICINE

## 2022-05-01 PROCEDURE — 99233 PR SUBSEQUENT HOSPITAL CARE,LEVL III: ICD-10-PCS | Mod: ,,, | Performed by: FAMILY MEDICINE

## 2022-05-01 RX ADMIN — APIXABAN 2.5 MG: 2.5 TABLET, FILM COATED ORAL at 08:05

## 2022-05-01 RX ADMIN — OXYBUTYNIN CHLORIDE 5 MG: 5 TABLET ORAL at 09:05

## 2022-05-01 RX ADMIN — METOPROLOL SUCCINATE 100 MG: 50 TABLET, EXTENDED RELEASE ORAL at 09:05

## 2022-05-01 RX ADMIN — PIPERACILLIN SODIUM AND TAZOBACTAM SODIUM 4.5 G: 4; .5 INJECTION, POWDER, LYOPHILIZED, FOR SOLUTION INTRAVENOUS at 09:05

## 2022-05-01 RX ADMIN — ACETAMINOPHEN 1000 MG: 500 TABLET ORAL at 11:05

## 2022-05-01 RX ADMIN — FLUTICASONE FUROATE AND VILANTEROL TRIFENATATE 1 PUFF: 100; 25 POWDER RESPIRATORY (INHALATION) at 03:05

## 2022-05-01 RX ADMIN — OXYBUTYNIN CHLORIDE 5 MG: 5 TABLET ORAL at 05:05

## 2022-05-01 RX ADMIN — MICAFUNGIN SODIUM 100 MG: 100 INJECTION, POWDER, LYOPHILIZED, FOR SOLUTION INTRAVENOUS at 05:05

## 2022-05-01 RX ADMIN — ATORVASTATIN CALCIUM 20 MG: 20 TABLET, FILM COATED ORAL at 08:05

## 2022-05-01 RX ADMIN — LOSARTAN POTASSIUM 25 MG: 25 TABLET, FILM COATED ORAL at 09:05

## 2022-05-01 RX ADMIN — ASPIRIN 81 MG: 81 TABLET, COATED ORAL at 09:05

## 2022-05-01 RX ADMIN — MUPIROCIN: 20 OINTMENT TOPICAL at 09:05

## 2022-05-01 RX ADMIN — PIPERACILLIN SODIUM AND TAZOBACTAM SODIUM 4.5 G: 4; .5 INJECTION, POWDER, LYOPHILIZED, FOR SOLUTION INTRAVENOUS at 01:05

## 2022-05-01 RX ADMIN — APIXABAN 2.5 MG: 2.5 TABLET, FILM COATED ORAL at 09:05

## 2022-05-01 NOTE — H&P (VIEW-ONLY)
[facility] ([place] [address])  Urology  Progress Note    Patient Name: Misa Barajas  MRN: [id number]  Admission Date: 4/28/2022  Hospital Length of Stay: 3 days  Code Status: Full Code   Attending Provider: Celina Pereira MD   Primary Care Physician: Celia Carlisle MD    Subjective:     HPI:  Patient is a 77 yo F with PMH of COPD, CRF on O2, Diastolic HF (EF 65%), Parox AFib on Eliquis, HTN, HLD, CKD, Hx of Breast cancer s/p L mastectomy presenting to the ED with the chief complaint of gross hematuria. She has had hematuria, decreased UOP, and chills for a few days. Today she developed fever and chills and came to the ED. She denies flank pain, suprapubic pain, dysuria. She has a history of nephrolithiasis requiring both ESWL and URS in the past.     CT RSS obtained in the ED shows severe right hydroureteronephrosis to the level of a 1.3cm right distal ureteral stone. WBC 7.7. Cr 2.0 (BL 1.2). UA nitrite negative, >100 RBC, 21 WBC, no bacteria. Febrile to 101.5 in the ED. She last ate at 615 am. She last drank water an hour ago.      Interval History: NAEON. VSS. Pt with 2 Bcx growing back yeast. Ucx NGTD. Labs stable. UOP adequate, Cr. Stable.       Objective:     Temp:  [97.1 °F (36.2 °C)-100.1 °F (37.8 °C)] 100.1 °F (37.8 °C)  Pulse:  [62-78] 76  Resp:  [14-20] 16  SpO2:  [91 %-96 %] 93 %  BP: (113-165)/(56-77) 139/63     Body mass index is 28.67 kg/m².      Post Void Cath Residual Output (mL): 40 mL (04/29/22 1500)    Drains       None                   Physical Exam  Vitals and nursing note reviewed.   Constitutional:       General: She is not in acute distress.  HENT:      Head: Atraumatic.      Nose: Nose normal.   Eyes:      Extraocular Movements: Extraocular movements intact.   Cardiovascular:      Rate and Rhythm: Normal rate.   Pulmonary:      Effort: Pulmonary effort is normal.   Abdominal:      General: Abdomen is flat.      Tenderness: There is no right CVA tenderness or left CVA  tenderness.   Musculoskeletal:         General: Normal range of motion.      Cervical back: Normal range of motion.   Neurological:      General: No focal deficit present.      Mental Status: She is alert. Mental status is at baseline.   Psychiatric:         Mood and Affect: Mood normal.         Behavior: Behavior normal.         Thought Content: Thought content normal.         Judgment: Judgment normal.       Significant Labs:    BMP:  Recent Labs   Lab 04/29/22  1408 04/30/22  0526 05/01/22  0502    139 139   K 3.9 4.0 3.8    104 105   CO2 28 23 22*   BUN 32* 32* 33*   CREATININE 2.1* 2.1* 2.1*   CALCIUM 9.2 9.0 9.2       CBC:   Recent Labs   Lab 04/28/22  1312 04/29/22  1408   WBC 7.74 4.41   HGB 15.1 12.7   HCT 45.8 39.7    141*       All pertinent labs results from the past 24 hours have been reviewed.    Significant Imaging:  All pertinent imaging results/findings from the past 24 hours have been reviewed.                    Assessment/Plan:     * Right ureteral stone  75 yo F with febrile right ureteral stone and MARQUEZ.    - S/p cystoscopy with right ureteral stent placement on 4/29.   - Bcx growing yeast, recommend ID consult for appropriate antifungals, CTM  - Trend WBC and Cr  - F/u urine cultures.  - Patient will need to be on culture appropriate antibiotics for 2 weeks.  - We will set up outpatient ureteroscopy in a couple weeks for definitive stone management.        VTE Risk Mitigation (From admission, onward)         Ordered     apixaban tablet 2.5 mg  2 times daily         04/29/22 1209     IP VTE HIGH RISK PATIENT  Once         04/28/22 1723     Place sequential compression device  Until discontinued         04/28/22 1723                Cal Espino MD  Urology  [facility] - Intensive Care ([facility]

## 2022-05-01 NOTE — PROGRESS NOTES
[facility] ([address] Medicine  Progress Note    Patient Name: Misa Barajas  MRN: [id number]  Patient Class: IP- Inpatient   Admission Date: 4/28/2022  Length of Stay: 3 days  Attending Physician: Celina Pereira MD  Primary Care Provider: Celia Carlisle MD      Subjective:     Principal Problem:Right ureteral stone      HPI:  Misa Barajas is a 76 y.o. female with medical history of COPD, CRF on O2, Diastolic HF (EF 65%), Parox AFib on Eliquis, HTN, HLD, CKD, Hx of Breast cancer s/p L mastectomy who was admitted to the hospital for an infected kidney stone. Patient reports 5 days of hematuria with chills. No reported fever. Concerned she is having a side effect of her Eliquis. Denies abdominal pain, flank pain, dysuria, diarrhea, constipation.   CT RSS obtained in the ED showed severe right hydroureteronephrosis to the level of a 1.3cm right distal ureteral stone. WBC 7.7. Cr 2.0 (BL 1.2). UA nitrite negative, >100 RBC, 21 WBC, no bacteria. Febrile to 101.5 in the ED. Urology was consulted. She was taken to the OR for class A cystoscopy with right ureteral stent placement.  She was seen in the PACU and has no current pain.       Interval History: NAEON. Doing well.     Review of Systems   Constitutional:  Negative for chills and fever.   Respiratory:  Negative for shortness of breath.    Cardiovascular:  Negative for chest pain.   Gastrointestinal:  Negative for abdominal pain.   Genitourinary:  Negative for dysuria.   Neurological:  Negative for headaches.   Psychiatric/Behavioral:  Negative for confusion.      Objective:     Vital Signs (Most Recent):  Temp: 99.7 °F (37.6 °C) (05/01/22 1230)  Pulse: 72 (05/01/22 1531)  Resp: 20 (05/01/22 1531)  BP: (!) 118/58 (05/01/22 1230)  SpO2: (!) 94 % (05/01/22 1415)   Vital Signs (24h Range):  Temp:  [97.1 °F (36.2 °C)-100.1 °F (37.8 °C)] 99.7 °F (37.6 °C)  Pulse:  [63-78] 72  Resp:  [14-20] 20  SpO2:  [86 %-96 %] 94 %  BP:  (113-165)/(58-77) 118/58     Weight: 75.8 kg (167 lb)  Body mass index is 28.67 kg/m².    Intake/Output Summary (Last 24 hours) at 5/1/2022 1609  Last data filed at 5/1/2022 1400  Gross per 24 hour   Intake 120 ml   Output --   Net 120 ml        Physical Exam  Vitals and nursing note reviewed.   Constitutional:       General: She is not in acute distress.     Appearance: She is well-developed.   HENT:      Head: Normocephalic and atraumatic.   Eyes:      Conjunctiva/sclera: Conjunctivae normal.   Neck:      Vascular: No JVD.   Cardiovascular:      Rate and Rhythm: Normal rate and regular rhythm.      Heart sounds: Normal heart sounds.   Pulmonary:      Effort: Pulmonary effort is normal.      Breath sounds: Normal breath sounds.   Abdominal:      General: Bowel sounds are normal. There is no distension.      Palpations: Abdomen is soft.   Musculoskeletal:      Cervical back: Neck supple.      Right lower leg: No edema.      Left lower leg: No edema.   Neurological:      Mental Status: She is alert.   Psychiatric:         Behavior: Behavior normal.       Significant Labs: All pertinent labs within the past 24 hours have been reviewed.  CBC:   No results for input(s): WBC, HGB, HCT, PLT in the last 48 hours.    CMP:   Recent Labs   Lab 04/30/22  0526 05/01/22  0502    139   K 4.0 3.8    105   CO2 23 22*   GLU 92 83   BUN 32* 33*   CREATININE 2.1* 2.1*   CALCIUM 9.0 9.2   ANIONGAP 12 12   EGFRNONAA 22.1* 22.1*       Urine Culture: No results for input(s): LABURIN in the last 48 hours.    Significant Imaging: I have reviewed all pertinent imaging results/findings within the past 24 hours.      Assessment/Plan:      R renal stone  R hydronephrosis and hydroureter  Pyelonephritis   - s/p stent placement. Continue IV abx for pyuria and pyonephrosis seen during procedure. Continue anti pyretics prn. Oxybutynin for spasms.   - urine cultures negative x2.  - Patient will need to be on antibiotics for 2 weeks.  -  outpatient ureteroscopy in a couple weeks for definitive stone management.        Fungemia  ID consulted. Repeat blood Cx's.     MARQUEZ on CKD III  Secondary to obstructive stone. No improvement since stent. Repeat renal u/s with improvement in hydronephrosis.   Nephrology consulted.   Renally dose meds for Estimated Creatinine Clearance: 22 mL/min (A) (based on SCr of 2.1 mg/dL (H)).         COPD  Chronic respiratory failure secondary to hypoxia  - only uses her O2 prn at home. Will monitor  - Breo and prn inh tx     Chronic diastolic CHF  Stable. Continue home meds. Monitor fluids.      PAF  Stable. Continue BB and Eliquis.      HTN  Stable. Monitor in setting of systemic infection.    VTE Risk Mitigation (From admission, onward)         Ordered     apixaban tablet 2.5 mg  2 times daily         04/29/22 1209     IP VTE HIGH RISK PATIENT  Once         04/28/22 1723     Place sequential compression device  Until discontinued         04/28/22 1723                Discharge Planning   NOHEMI: 5/2/2022     Code Status: Full Code   Is the patient medically ready for discharge?:     Reason for patient still in hospital (select all that apply): Patient trending condition, Treatment and Consult recommendations  Discharge Plan A: Home        Celina Pereira MD  Department of Hospital Medicine   [facility] - Intensive Care ([place])

## 2022-05-01 NOTE — SUBJECTIVE & OBJECTIVE
Past Medical History:   Diagnosis Date    Acute hypoxemic respiratory failure 6/26/2021    Aortic atherosclerosis     Arthritis     knee joint pain    Breast cancer 2002    left breast & lymph nodes-s/p sx with chemo    Bronchitis     with flu-2/2014    Cataracts, bilateral     Chronic diastolic heart failure 12/24/2019    Chronic kidney disease, stage 3     History of chemotherapy     last treatment 12/2002 (had 8 treatments)    Hyperlipidemia 11/20/2016    Hypertension     Osteoporosis     Paroxysmal atrial fibrillation 6/7/2021    Renal calculi     hematuria    Vitamin D insufficiency        Past Surgical History:   Procedure Laterality Date    BREAST BIOPSY Left 2002    core bx, +    BREAST BIOPSY Right 2018    core    BREAST BIOPSY Right 2019    BREAST LUMPECTOMY Left 2002    LITHOTRIPSY      MASTECTOMY Left 06/2002    left-& lymph node dissection    ROBOT-ASSISTED LAPAROSCOPIC PARTIAL NEPHRECTOMY USING DA JESÚS XI Right 3/11/2021    Procedure: XI ROBOTIC NEPHRECTOMY, PARTIAL;  Surgeon: Taz Ortega MD;  Location: [facility] OR [address] [place] [address];  Service: Urology;  Laterality: Right;  4hr/ gen with regional  [place] confirmation:  [id number] for 11:15am case NC    ureteroscopy Bilateral     6.14       Review of patient's allergies indicates:   Allergen Reactions    Adhesive Rash     Pt states she removed a LATEX bandaid and the skin beneath was swollen and red. No other latex causes a reaction.       Medications:  Medications Prior to Admission   Medication Sig    acetaminophen (TYLENOL) 500 MG tablet Take 2 tablets (1,000 mg total) by mouth every 8 (eight) hours as needed for Pain.    albuterol (ACCUNEB) 0.63 mg/3 mL Nebu Take 3 mLs (0.63 mg total) by nebulization every 6 (six) hours as needed. Rescue    apixaban (ELIQUIS) 5 mg Tab Take 5 mg by mouth once daily.    aspirin (ECOTRIN) 81 MG EC tablet Take 81 mg by mouth once daily.    biotin 1 mg tablet Take 1,000 mcg by mouth 3 (three) times daily.     budesonide-glycopyr-formoterol (BREZTRI AEROSPHERE) 160-9-4.8 mcg/actuation HFAA Inhale 2 puffs into the lungs 2 (two) times daily.    fish oil-omega-3 fatty acids 300-1,000 mg capsule Take 2 g by mouth once daily.    furosemide (LASIX) 20 MG tablet Take 1 tablet by mouth daily If weight gain 2 to3 lbs for 2 to 5 days. If no improvement, call MD (Patient taking differently: Take 1 tablet by mouth daily If weight gain 2 to3 lbs for 2 to 5 days. If no improvement, call MD)    inhalation spacing device Use as directed for inhalation.    losartan (COZAAR) 25 MG tablet Take 1 tablet (25 mg total) by mouth once daily.    metoprolol succinate (TOPROL-XL) 100 MG 24 hr tablet Take 1 tablet (100 mg total) by mouth once daily.    ondansetron (ZOFRAN) 4 MG tablet Take 1 tablet (4 mg total) by mouth every 8 (eight) hours as needed for Nausea.    rosuvastatin (CRESTOR) 5 MG tablet Take 1 tablet (5 mg total) by mouth once daily.    vitamin D (VITAMIN D3) 1000 units Tab TAKE 2 TABLETS ONE TIME DAILY    [DISCONTINUED] rosuvastatin (CRESTOR) 5 MG tablet Take 1 tablet (5 mg total) by mouth once daily.     Antibiotics (From admission, onward)                Start     Stop Route Frequency Ordered    04/29/22 1200  mupirocin 2 % ointment         05/04 0859 Nasl 2 times daily 04/29/22 1058    04/28/22 2300  piperacillin-tazobactam 4.5 g in sodium chloride 0.9% 100 mL IVPB (ready to mix system)         -- IV Every 8 hours (non-standard times) 04/28/22 1726          Antifungals (From admission, onward)                Start     Stop Route Frequency Ordered    05/01/22 1114  micafungin 100 mg in sodium chloride 0.9 % 100 mL IVPB (ready to mix system)         -- IV Every 24 hours (non-standard times) 05/01/22 1114          Antivirals (From admission, onward)      None             Immunization History   Administered Date(s) Administered    COVID-19, MRNA, LN-S, PF (Pfizer) (Purple Cap) 01/13/2021, 02/04/2021    Influenza 10/05/2009, 10/12/2011,  09/10/2014, 2019    Influenza (FLUAD) - Quadrivalent - Adjuvanted - PF *Preferred* (65+) 10/08/2020    Influenza (FLUAD) - Trivalent - Adjuvanted - PF (65+) 2019    Influenza - High Dose - PF (65 years and older) 2015, 2016, 2017, 2018, 10/08/2020    Influenza - Quadrivalent - PF *Preferred* (6 months and older) 10/05/2009, 10/12/2011, 09/10/2014    Pneumococcal Conjugate - 13 Valent 2016    Pneumococcal Polysaccharide - 23 Valent 2014    Tdap 2016       Family History       Problem Relation (Age of Onset)    Arthritis Mother, Sister    Bone cancer Mother    Breast cancer Mother, Sister    Cancer Father    Crohn's disease Daughter    Fibroids Daughter    No Known Problems Brother, Daughter          Social History     Socioeconomic History    Marital status:    Tobacco Use    Smoking status: Former Smoker     Packs/day: 1.00     Years: 50.00     Pack years: 50.00     Types: Cigarettes     Start date:      Quit date: 2009     Years since quittin.9    Smokeless tobacco: Never Used   Substance and Sexual Activity    Alcohol use: No    Drug use: No    Sexual activity: Not Currently     Partners: Male     Birth control/protection: Partner-Vasectomy     Social Determinants of Health     Financial Resource Strain: Low Risk     Difficulty of Paying Living Expenses: Not hard at all   Food Insecurity: No Food Insecurity    Worried About Running Out of Food in the Last Year: Never true    Ran Out of Food in the Last Year: Never true   Transportation Needs: No Transportation Needs    Lack of Transportation (Medical): No    Lack of Transportation (Non-Medical): No   Physical Activity: Insufficiently Active    Days of Exercise per Week: 3 days    Minutes of Exercise per Session: 20 min   Stress: Stress Concern Present    Feeling of Stress : To some extent   Social Connections: Socially Isolated    Frequency of Communication with Friends and Family: More  than three times a week    Frequency of Social Gatherings with Friends and Family: More than three times a week    Attends Religion Services: Never    Active Member of Clubs or Organizations: No    Attends Club or Organization Meetings: Never    Marital Status:    Housing Stability: Low Risk     Unable to Pay for Housing in the Last Year: No    Number of Places Lived in the Last Year: 1    Unstable Housing in the Last Year: No     Review of Systems   Constitutional:  Positive for chills and fever.   HENT:  Negative for congestion and trouble swallowing.    Eyes:  Negative for visual disturbance.   Respiratory:  Negative for cough and shortness of breath.    Gastrointestinal:  Negative for abdominal pain, diarrhea, nausea and vomiting.   Genitourinary:  Positive for hematuria. Negative for difficulty urinating and dysuria.   Musculoskeletal:  Negative for arthralgias, back pain and myalgias.   Skin:  Negative for rash and wound.   Allergic/Immunologic: Negative for immunocompromised state.   Neurological:  Negative for dizziness and headaches.   Psychiatric/Behavioral:  Negative for agitation and confusion.    Objective:     Vital Signs (Most Recent):  Temp: 100.1 °F (37.8 °C) (05/01/22 0800)  Pulse: 76 (05/01/22 0800)  Resp: 16 (05/01/22 0800)  BP: 139/63 (05/01/22 0800)  SpO2: (!) 93 % (05/01/22 0800)   Vital Signs (24h Range):  Temp:  [97.1 °F (36.2 °C)-100.1 °F (37.8 °C)] 100.1 °F (37.8 °C)  Pulse:  [63-78] 76  Resp:  [14-20] 16  SpO2:  [91 %-96 %] 93 %  BP: (113-165)/(60-77) 139/63     Weight: 75.8 kg (167 lb)  Body mass index is 28.67 kg/m².    Estimated Creatinine Clearance: 22 mL/min (A) (based on SCr of 2.1 mg/dL (H)).    Physical Exam  Vitals reviewed.   Constitutional:       Appearance: Normal appearance.   HENT:      Head: Normocephalic and atraumatic.   Eyes:      Pupils: Pupils are equal, round, and reactive to light.   Cardiovascular:      Rate and Rhythm: Normal rate and regular rhythm.    Pulmonary:      Effort: Pulmonary effort is normal. No respiratory distress.      Breath sounds: Normal breath sounds.   Abdominal:      General: Abdomen is flat. Bowel sounds are normal.      Palpations: Abdomen is soft.      Tenderness: There is no abdominal tenderness.   Musculoskeletal:         General: Normal range of motion.      Cervical back: Normal range of motion and neck supple.      Right lower leg: No edema.      Left lower leg: No edema.   Skin:     General: Skin is warm.      Coloration: Skin is not jaundiced.      Findings: No lesion.   Neurological:      General: No focal deficit present.      Mental Status: She is alert and oriented to person, place, and time.   Psychiatric:         Mood and Affect: Mood normal.         Behavior: Behavior normal.       Significant Labs: All pertinent labs within the past 24 hours have been reviewed.    Significant Imaging: I have reviewed all pertinent imaging results/findings within the past 24 hours.

## 2022-05-01 NOTE — SUBJECTIVE & OBJECTIVE
Interval History: NAEON. Doing well.     Review of Systems   Constitutional:  Negative for chills and fever.   Respiratory:  Negative for shortness of breath.    Cardiovascular:  Negative for chest pain.   Gastrointestinal:  Negative for abdominal pain.   Genitourinary:  Negative for dysuria.   Neurological:  Negative for headaches.   Psychiatric/Behavioral:  Negative for confusion.      Objective:     Vital Signs (Most Recent):  Temp: 99.7 °F (37.6 °C) (05/01/22 1230)  Pulse: 72 (05/01/22 1531)  Resp: 20 (05/01/22 1531)  BP: (!) 118/58 (05/01/22 1230)  SpO2: (!) 94 % (05/01/22 1415)   Vital Signs (24h Range):  Temp:  [97.1 °F (36.2 °C)-100.1 °F (37.8 °C)] 99.7 °F (37.6 °C)  Pulse:  [63-78] 72  Resp:  [14-20] 20  SpO2:  [86 %-96 %] 94 %  BP: (113-165)/(58-77) 118/58     Weight: 75.8 kg (167 lb)  Body mass index is 28.67 kg/m².    Intake/Output Summary (Last 24 hours) at 5/1/2022 1609  Last data filed at 5/1/2022 1400  Gross per 24 hour   Intake 120 ml   Output --   Net 120 ml        Physical Exam  Vitals and nursing note reviewed.   Constitutional:       General: She is not in acute distress.     Appearance: She is well-developed.   HENT:      Head: Normocephalic and atraumatic.   Eyes:      Conjunctiva/sclera: Conjunctivae normal.   Neck:      Vascular: No JVD.   Cardiovascular:      Rate and Rhythm: Normal rate and regular rhythm.      Heart sounds: Normal heart sounds.   Pulmonary:      Effort: Pulmonary effort is normal.      Breath sounds: Normal breath sounds.   Abdominal:      General: Bowel sounds are normal. There is no distension.      Palpations: Abdomen is soft.   Musculoskeletal:      Cervical back: Neck supple.      Right lower leg: No edema.      Left lower leg: No edema.   Neurological:      Mental Status: She is alert.   Psychiatric:         Behavior: Behavior normal.       Significant Labs: All pertinent labs within the past 24 hours have been reviewed.  CBC:   No results for input(s): WBC, HGB,  HCT, PLT in the last 48 hours.    CMP:   Recent Labs   Lab 04/30/22  0526 05/01/22  0502    139   K 4.0 3.8    105   CO2 23 22*   GLU 92 83   BUN 32* 33*   CREATININE 2.1* 2.1*   CALCIUM 9.0 9.2   ANIONGAP 12 12   EGFRNONAA 22.1* 22.1*       Urine Culture: No results for input(s): LABURIN in the last 48 hours.    Significant Imaging: I have reviewed all pertinent imaging results/findings within the past 24 hours.

## 2022-05-01 NOTE — PLAN OF CARE
Fall precaution maintained this shift call bell in reach. Bed in low position. Instructed pt to call for assistance. No acute distress noted over night. Vs stable. All concerns addressed and answered.

## 2022-05-01 NOTE — CONSULTS
[facility] ([place] [address])  Infectious Disease  Consult Note    Patient Name: Misa Barajas  MRN: [id number]  Admission Date: 4/28/2022  Hospital Length of Stay: 3 days  Attending Physician: Celina Pereira MD  Primary Care Provider: Celia Carlisle MD     Isolation Status: No active isolations    Patient information was obtained from patient, past medical records and ER records.      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 Febrile on admit with pyuria on UA, but negative urine culture. CT revealed severe rt hydronephrosis with perinephric stranding and inflammatory change surrounding the ureter with a 7mm stone at the distal ureter. Underwent cystoscopy with rt ureteral stent placement 4/29 that revealed pyonephrosis. ID consulted for fungemia (BCx 4/28). Suspect due to urinary source in the setting of obstructing stone.    Recommendations:  --start micafungin pending ID of yeast  --if repeat BCx are still positive will need TTE  --if develops vision changes will need ophtha eval        Thank you for your consult. I will follow-up with patient. Please contact us if you have any additional questions.    Kamille Velarde MD  Infectious Disease  [facility] ([place] [address])    Subjective:     Principal Problem: Right ureteral stone    HPI: Ms. 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 Febrile on admit with pyuria on UA, but negative urine culture. Underwent cystoscopy with rt ureteral stent placement 4/29. ID consulted for fungemia. Denies recent diarrhea, N/V abdominal pain, diarrhea, dysuria. No vision changes. Does not have a port.      Past Medical History:   Diagnosis Date    Acute hypoxemic respiratory failure 6/26/2021    Aortic atherosclerosis     Arthritis     knee joint pain    Breast cancer 2002    left breast & lymph nodes-s/p sx with chemo    Bronchitis     with flu-2/2014    Cataracts, bilateral     Chronic diastolic heart failure 12/24/2019    Chronic kidney disease, stage 3     History of chemotherapy     last treatment 12/2002 (had 8 treatments)    Hyperlipidemia 11/20/2016    Hypertension     Osteoporosis     Paroxysmal atrial fibrillation 6/7/2021    Renal calculi     hematuria    Vitamin D insufficiency        Past Surgical History:   Procedure Laterality Date    BREAST BIOPSY Left 2002    core bx, +    BREAST BIOPSY Right 2018    core    BREAST BIOPSY Right 2019    BREAST LUMPECTOMY Left 2002    LITHOTRIPSY      MASTECTOMY Left 06/2002    left-& lymph node dissection    ROBOT-ASSISTED LAPAROSCOPIC PARTIAL NEPHRECTOMY USING DA JESÚS XI Right 3/11/2021    Procedure: XI ROBOTIC NEPHRECTOMY, PARTIAL;  Surgeon: Taz Ortega MD;  Location: [facility] OR [address] [place] [address];  Service: Urology;  Laterality: Right;  4hr/ gen with regional  [facility] confirmation:  [phone] for 11:15am case [place]    ureteroscopy Bilateral     6.14       Review of patient's allergies indicates:   Allergen Reactions    Adhesive Rash     Pt states she removed a LATEX bandaid and the skin beneath was swollen and red. No other latex causes a reaction.       Medications:  Medications Prior to Admission   Medication Sig    acetaminophen (TYLENOL) 500 MG tablet Take 2 tablets (1,000 mg total) by mouth  every 8 (eight) hours as needed for Pain.    albuterol (ACCUNEB) 0.63 mg/3 mL Nebu Take 3 mLs (0.63 mg total) by nebulization every 6 (six) hours as needed. Rescue    apixaban (ELIQUIS) 5 mg Tab Take 5 mg by mouth once daily.    aspirin (ECOTRIN) 81 MG EC tablet Take 81 mg by mouth once daily.    biotin 1 mg tablet Take 1,000 mcg by mouth 3 (three) times daily.    budesonide-glycopyr-formoterol (BREZTRI AEROSPHERE) 160-9-4.8 mcg/actuation HFAA Inhale 2 puffs into the lungs 2 (two) times daily.    fish oil-omega-3 fatty acids 300-1,000 mg capsule Take 2 g by mouth once daily.    furosemide (LASIX) 20 MG tablet Take 1 tablet by mouth daily If weight gain 2 to3 lbs for 2 to 5 days. If no improvement, call MD (Patient taking differently: Take 1 tablet by mouth daily If weight gain 2 to3 lbs for 2 to 5 days. If no improvement, call MD)    inhalation spacing device Use as directed for inhalation.    losartan (COZAAR) 25 MG tablet Take 1 tablet (25 mg total) by mouth once daily.    metoprolol succinate (TOPROL-XL) 100 MG 24 hr tablet Take 1 tablet (100 mg total) by mouth once daily.    ondansetron (ZOFRAN) 4 MG tablet Take 1 tablet (4 mg total) by mouth every 8 (eight) hours as needed for Nausea.    rosuvastatin (CRESTOR) 5 MG tablet Take 1 tablet (5 mg total) by mouth once daily.    vitamin D (VITAMIN D3) 1000 units Tab TAKE 2 TABLETS ONE TIME DAILY    [DISCONTINUED] rosuvastatin (CRESTOR) 5 MG tablet Take 1 tablet (5 mg total) by mouth once daily.     Antibiotics (From admission, onward)                Start     Stop Route Frequency Ordered    04/29/22 1200  mupirocin 2 % ointment         05/04 0859 Nasl 2 times daily 04/29/22 1058    04/28/22 2300  piperacillin-tazobactam 4.5 g in sodium chloride 0.9% 100 mL IVPB (ready to mix system)         -- IV Every 8 hours (non-standard times) 04/28/22 1726          Antifungals (From admission, onward)                Start     Stop Route Frequency Ordered    05/01/22  1114  micafungin 100 mg in sodium chloride 0.9 % 100 mL IVPB (ready to mix system)         -- IV Every 24 hours (non-standard times) 22 1114          Antivirals (From admission, onward)      None             Immunization History   Administered Date(s) Administered    COVID-19, MRNA, LN-S, PF (Pfizer) (Purple Cap) 2021, 2021    Influenza 10/05/2009, 10/12/2011, 09/10/2014, 2019    Influenza (FLUAD) - Quadrivalent - Adjuvanted - PF *Preferred* (65+) 10/08/2020    Influenza (FLUAD) - Trivalent - Adjuvanted - PF (65+) 2019    Influenza - High Dose - PF (65 years and older) 2015, 2016, 2017, 2018, 10/08/2020    Influenza - Quadrivalent - PF *Preferred* (6 months and older) 10/05/2009, 10/12/2011, 09/10/2014    Pneumococcal Conjugate - 13 Valent 2016    Pneumococcal Polysaccharide - 23 Valent 2014    Tdap 2016       Family History       Problem Relation (Age of Onset)    Arthritis Mother, Sister    Bone cancer Mother    Breast cancer Mother, Sister    Cancer Father    Crohn's disease Daughter    Fibroids Daughter    No Known Problems Brother, Daughter          Social History     Socioeconomic History    Marital status:    Tobacco Use    Smoking status: Former Smoker     Packs/day: 1.00     Years: 50.00     Pack years: 50.00     Types: Cigarettes     Start date:      Quit date: 2009     Years since quittin.9    Smokeless tobacco: Never Used   Substance and Sexual Activity    Alcohol use: No    Drug use: No    Sexual activity: Not Currently     Partners: Male     Birth control/protection: Partner-Vasectomy     Social Determinants of Health     Financial Resource Strain: Low Risk     Difficulty of Paying Living Expenses: Not hard at all   Food Insecurity: No Food Insecurity    Worried About Running Out of Food in the Last Year: Never true    Ran Out of Food in the Last Year: Never true   Transportation Needs: No  Transportation Needs    Lack of Transportation (Medical): No    Lack of Transportation (Non-Medical): No   Physical Activity: Insufficiently Active    Days of Exercise per Week: 3 days    Minutes of Exercise per Session: 20 min   Stress: Stress Concern Present    Feeling of Stress : To some extent   Social Connections: Socially Isolated    Frequency of Communication with Friends and Family: More than three times a week    Frequency of Social Gatherings with Friends and Family: More than three times a week    Attends [employer] Services: Never    Active Member of Clubs or Organizations: No    Attends Club or Organization Meetings: Never    Marital Status:    Housing Stability: Low Risk     Unable to Pay for Housing in the Last Year: No    Number of Places Lived in the Last Year: 1    Unstable Housing in the Last Year: No     Review of Systems   Constitutional:  Positive for chills and fever.   HENT:  Negative for congestion and trouble swallowing.    Eyes:  Negative for visual disturbance.   Respiratory:  Negative for cough and shortness of breath.    Gastrointestinal:  Negative for abdominal pain, diarrhea, nausea and vomiting.   Genitourinary:  Positive for hematuria. Negative for difficulty urinating and dysuria.   Musculoskeletal:  Negative for arthralgias, back pain and myalgias.   Skin:  Negative for rash and wound.   Allergic/Immunologic: Negative for immunocompromised state.   Neurological:  Negative for dizziness and headaches.   Psychiatric/Behavioral:  Negative for agitation and confusion.    Objective:     Vital Signs (Most Recent):  Temp: 100.1 °F (37.8 °C) (05/01/22 0800)  Pulse: 76 (05/01/22 0800)  Resp: 16 (05/01/22 0800)  BP: 139/63 (05/01/22 0800)  SpO2: (!) 93 % (05/01/22 0800)   Vital Signs (24h Range):  Temp:  [97.1 °F (36.2 °C)-100.1 °F (37.8 °C)] 100.1 °F (37.8 °C)  Pulse:  [63-78] 76  Resp:  [14-20] 16  SpO2:  [91 %-96 %] 93 %  BP: (113-165)/(60-77) 139/63     Weight: 75.8  kg (167 lb)  Body mass index is 28.67 kg/m².    Estimated Creatinine Clearance: 22 mL/min (A) (based on SCr of 2.1 mg/dL (H)).    Physical Exam  Vitals reviewed.   Constitutional:       Appearance: Normal appearance.   HENT:      Head: Normocephalic and atraumatic.   Eyes:      Pupils: Pupils are equal, round, and reactive to light.   Cardiovascular:      Rate and Rhythm: Normal rate and regular rhythm.   Pulmonary:      Effort: Pulmonary effort is normal. No respiratory distress.      Breath sounds: Normal breath sounds.   Abdominal:      General: Abdomen is flat. Bowel sounds are normal.      Palpations: Abdomen is soft.      Tenderness: There is no abdominal tenderness.   Musculoskeletal:         General: Normal range of motion.      Cervical back: Normal range of motion and neck supple.      Right lower leg: No edema.      Left lower leg: No edema.   Skin:     General: Skin is warm.      Coloration: Skin is not jaundiced.      Findings: No lesion.   Neurological:      General: No focal deficit present.      Mental Status: She is alert and oriented to person, place, and time.   Psychiatric:         Mood and Affect: Mood normal.         Behavior: Behavior normal.       Significant Labs: All pertinent labs within the past 24 hours have been reviewed.    Significant Imaging: I have reviewed all pertinent imaging results/findings within the past 24 hours.

## 2022-05-01 NOTE — SUBJECTIVE & OBJECTIVE
Interval History: NAEON. VSS. Pt with 2 Bcx growing back yeast. Ucx NGTD. Labs stable. UOP adequate, Cr. Stable.       Objective:     Temp:  [97.1 °F (36.2 °C)-100.1 °F (37.8 °C)] 100.1 °F (37.8 °C)  Pulse:  [62-78] 76  Resp:  [14-20] 16  SpO2:  [91 %-96 %] 93 %  BP: (113-165)/(56-77) 139/63     Body mass index is 28.67 kg/m².      Post Void Cath Residual Output (mL): 40 mL (04/29/22 1500)    Drains       None                   Physical Exam  Vitals and nursing note reviewed.   Constitutional:       General: She is not in acute distress.  HENT:      Head: Atraumatic.      Nose: Nose normal.   Eyes:      Extraocular Movements: Extraocular movements intact.   Cardiovascular:      Rate and Rhythm: Normal rate.   Pulmonary:      Effort: Pulmonary effort is normal.   Abdominal:      General: Abdomen is flat.      Tenderness: There is no right CVA tenderness or left CVA tenderness.   Musculoskeletal:         General: Normal range of motion.      Cervical back: Normal range of motion.   Neurological:      General: No focal deficit present.      Mental Status: She is alert. Mental status is at baseline.   Psychiatric:         Mood and Affect: Mood normal.         Behavior: Behavior normal.         Thought Content: Thought content normal.         Judgment: Judgment normal.       Significant Labs:    BMP:  Recent Labs   Lab 04/29/22  1408 04/30/22  0526 05/01/22  0502    139 139   K 3.9 4.0 3.8    104 105   CO2 28 23 22*   BUN 32* 32* 33*   CREATININE 2.1* 2.1* 2.1*   CALCIUM 9.2 9.0 9.2       CBC:   Recent Labs   Lab 04/28/22  1312 04/29/22  1408   WBC 7.74 4.41   HGB 15.1 12.7   HCT 45.8 39.7    141*       All pertinent labs results from the past 24 hours have been reviewed.    Significant Imaging:  All pertinent imaging results/findings from the past 24 hours have been reviewed.

## 2022-05-01 NOTE — HPI
Ms. 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 Febrile on admit with pyuria on UA, but negative urine culture. Underwent cystoscopy with rt ureteral stent placement 4/29. ID consulted for fungemia. Denies recent diarrhea, N/V abdominal pain, diarrhea, dysuria. No vision changes. Does not have a port.

## 2022-05-01 NOTE — ASSESSMENT & PLAN NOTE
75 yo F with febrile right ureteral stone and MARQUEZ.    - S/p cystoscopy with right ureteral stent placement on 4/29.   - Bcx growing yeast, recommend ID consult for appropriate antifungals, CTM  - Trend WBC and Cr  - F/u urine cultures.  - Patient will need to be on culture appropriate antibiotics for 2 weeks.  - We will set up outpatient ureteroscopy in a couple weeks for definitive stone management.

## 2022-05-01 NOTE — ASSESSMENT & PLAN NOTE
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 Febrile on admit with pyuria on UA, but negative urine culture. CT revealed severe rt hydronephrosis with perinephric stranding and inflammatory change surrounding the ureter with a 7mm stone at the distal ureter. Underwent cystoscopy with rt ureteral stent placement 4/29 that revealed pyonephrosis. ID consulted for fungemia (BCx 4/28). Suspect due to urinary source in the setting of obstructing stone.    Recommendations:  --start micafungin pending ID of yeast  --if repeat BCx are still positive will need TTE  --if develops vision changes will need ophtha eval

## 2022-05-01 NOTE — PROGRESS NOTES
[facility] ([place] [address])  Urology  Progress Note    Patient Name: Misa Barajas  MRN: [id number]  Admission Date: 4/28/2022  Hospital Length of Stay: 3 days  Code Status: Full Code   Attending Provider: Celina Pereira MD   Primary Care Physician: Celia Carlisle MD    Subjective:     HPI:  Patient is a 77 yo F with PMH of COPD, CRF on O2, Diastolic HF (EF 65%), Parox AFib on Eliquis, HTN, HLD, CKD, Hx of Breast cancer s/p L mastectomy presenting to the ED with the chief complaint of gross hematuria. She has had hematuria, decreased UOP, and chills for a few days. Today she developed fever and chills and came to the ED. She denies flank pain, suprapubic pain, dysuria. She has a history of nephrolithiasis requiring both ESWL and URS in the past.     CT RSS obtained in the ED shows severe right hydroureteronephrosis to the level of a 1.3cm right distal ureteral stone. WBC 7.7. Cr 2.0 (BL 1.2). UA nitrite negative, >100 RBC, 21 WBC, no bacteria. Febrile to 101.5 in the ED. She last ate at 615 am. She last drank water an hour ago.      Interval History: NAEON. VSS. Pt with 2 Bcx growing back yeast. Ucx NGTD. Labs stable. UOP adequate, Cr. Stable.       Objective:     Temp:  [97.1 °F (36.2 °C)-100.1 °F (37.8 °C)] 100.1 °F (37.8 °C)  Pulse:  [62-78] 76  Resp:  [14-20] 16  SpO2:  [91 %-96 %] 93 %  BP: (113-165)/(56-77) 139/63     Body mass index is 28.67 kg/m².      Post Void Cath Residual Output (mL): 40 mL (04/29/22 1500)    Drains       None                   Physical Exam  Vitals and nursing note reviewed.   Constitutional:       General: She is not in acute distress.  HENT:      Head: Atraumatic.      Nose: Nose normal.   Eyes:      Extraocular Movements: Extraocular movements intact.   Cardiovascular:      Rate and Rhythm: Normal rate.   Pulmonary:      Effort: Pulmonary effort is normal.   Abdominal:      General: Abdomen is flat.      Tenderness: There is no right CVA tenderness or left CVA  tenderness.   Musculoskeletal:         General: Normal range of motion.      Cervical back: Normal range of motion.   Neurological:      General: No focal deficit present.      Mental Status: She is alert. Mental status is at baseline.   Psychiatric:         Mood and Affect: Mood normal.         Behavior: Behavior normal.         Thought Content: Thought content normal.         Judgment: Judgment normal.       Significant Labs:    BMP:  Recent Labs   Lab 04/29/22  1408 04/30/22  0526 05/01/22  0502    139 139   K 3.9 4.0 3.8    104 105   CO2 28 23 22*   BUN 32* 32* 33*   CREATININE 2.1* 2.1* 2.1*   CALCIUM 9.2 9.0 9.2       CBC:   Recent Labs   Lab 04/28/22  1312 04/29/22  1408   WBC 7.74 4.41   HGB 15.1 12.7   HCT 45.8 39.7    141*       All pertinent labs results from the past 24 hours have been reviewed.    Significant Imaging:  All pertinent imaging results/findings from the past 24 hours have been reviewed.                    Assessment/Plan:     * Right ureteral stone  77 yo F with febrile right ureteral stone and MARQUEZ.    - S/p cystoscopy with right ureteral stent placement on 4/29.   - Bcx growing yeast, recommend ID consult for appropriate antifungals, CTM  - Trend WBC and Cr  - F/u urine cultures.  - Patient will need to be on culture appropriate antibiotics for 2 weeks.  - We will set up outpatient ureteroscopy in a couple weeks for definitive stone management.        VTE Risk Mitigation (From admission, onward)         Ordered     apixaban tablet 2.5 mg  2 times daily         04/29/22 1209     IP VTE HIGH RISK PATIENT  Once         04/28/22 1723     Place sequential compression device  Until discontinued         04/28/22 1723                Cal Espino MD  Urology  [facility] - Intensive Care ([facility]-)

## 2022-05-02 PROBLEM — N17.9 AKI (ACUTE KIDNEY INJURY): Status: ACTIVE | Noted: 2022-05-02

## 2022-05-02 LAB
ANION GAP SERPL CALC-SCNC: 8 MMOL/L (ref 8–16)
BUN SERPL-MCNC: 32 MG/DL (ref 8–23)
CALCIUM SERPL-MCNC: 9.5 MG/DL (ref 8.7–10.5)
CHLORIDE SERPL-SCNC: 103 MMOL/L (ref 95–110)
CO2 SERPL-SCNC: 23 MMOL/L (ref 23–29)
CREAT SERPL-MCNC: 1.9 MG/DL (ref 0.5–1.4)
EST. GFR  (AFRICAN AMERICAN): 28.7 ML/MIN/1.73 M^2
EST. GFR  (NON AFRICAN AMERICAN): 24.9 ML/MIN/1.73 M^2
GLUCOSE SERPL-MCNC: 88 MG/DL (ref 70–110)
POTASSIUM SERPL-SCNC: 3.5 MMOL/L (ref 3.5–5.1)
SODIUM SERPL-SCNC: 134 MMOL/L (ref 136–145)

## 2022-05-02 PROCEDURE — 99233 SBSQ HOSP IP/OBS HIGH 50: CPT | Mod: ,,, | Performed by: FAMILY MEDICINE

## 2022-05-02 PROCEDURE — 25000242 PHARM REV CODE 250 ALT 637 W/ HCPCS: Performed by: FAMILY MEDICINE

## 2022-05-02 PROCEDURE — 25000003 PHARM REV CODE 250: Performed by: FAMILY MEDICINE

## 2022-05-02 PROCEDURE — 99222 1ST HOSP IP/OBS MODERATE 55: CPT | Mod: ,,, | Performed by: INTERNAL MEDICINE

## 2022-05-02 PROCEDURE — 87040 BLOOD CULTURE FOR BACTERIA: CPT | Performed by: INTERNAL MEDICINE

## 2022-05-02 PROCEDURE — 63600175 PHARM REV CODE 636 W HCPCS: Mod: JG | Performed by: INTERNAL MEDICINE

## 2022-05-02 PROCEDURE — 80048 BASIC METABOLIC PNL TOTAL CA: CPT | Performed by: FAMILY MEDICINE

## 2022-05-02 PROCEDURE — 99222 PR INITIAL HOSPITAL CARE,LEVL II: ICD-10-PCS | Mod: ,,, | Performed by: INTERNAL MEDICINE

## 2022-05-02 PROCEDURE — 99233 PR SUBSEQUENT HOSPITAL CARE,LEVL III: ICD-10-PCS | Mod: GC,,, | Performed by: STUDENT IN AN ORGANIZED HEALTH CARE EDUCATION/TRAINING PROGRAM

## 2022-05-02 PROCEDURE — 99233 SBSQ HOSP IP/OBS HIGH 50: CPT | Mod: GC,,, | Performed by: STUDENT IN AN ORGANIZED HEALTH CARE EDUCATION/TRAINING PROGRAM

## 2022-05-02 PROCEDURE — 12000002 HC ACUTE/MED SURGE SEMI-PRIVATE ROOM

## 2022-05-02 PROCEDURE — 63600175 PHARM REV CODE 636 W HCPCS: Performed by: FAMILY MEDICINE

## 2022-05-02 PROCEDURE — 99233 PR SUBSEQUENT HOSPITAL CARE,LEVL III: ICD-10-PCS | Mod: ,,, | Performed by: FAMILY MEDICINE

## 2022-05-02 RX ADMIN — PIPERACILLIN SODIUM AND TAZOBACTAM SODIUM 4.5 G: 4; .5 INJECTION, POWDER, LYOPHILIZED, FOR SOLUTION INTRAVENOUS at 09:05

## 2022-05-02 RX ADMIN — PIPERACILLIN SODIUM AND TAZOBACTAM SODIUM 4.5 G: 4; .5 INJECTION, POWDER, LYOPHILIZED, FOR SOLUTION INTRAVENOUS at 05:05

## 2022-05-02 RX ADMIN — APIXABAN 2.5 MG: 2.5 TABLET, FILM COATED ORAL at 09:05

## 2022-05-02 RX ADMIN — FLUTICASONE FUROATE AND VILANTEROL TRIFENATATE 1 PUFF: 100; 25 POWDER RESPIRATORY (INHALATION) at 09:05

## 2022-05-02 RX ADMIN — OXYBUTYNIN CHLORIDE 5 MG: 5 TABLET ORAL at 09:05

## 2022-05-02 RX ADMIN — ASPIRIN 81 MG: 81 TABLET, COATED ORAL at 09:05

## 2022-05-02 RX ADMIN — OXYBUTYNIN CHLORIDE 5 MG: 5 TABLET ORAL at 03:05

## 2022-05-02 RX ADMIN — MICAFUNGIN SODIUM 100 MG: 100 INJECTION, POWDER, LYOPHILIZED, FOR SOLUTION INTRAVENOUS at 11:05

## 2022-05-02 RX ADMIN — MUPIROCIN: 20 OINTMENT TOPICAL at 09:05

## 2022-05-02 RX ADMIN — ATORVASTATIN CALCIUM 20 MG: 20 TABLET, FILM COATED ORAL at 09:05

## 2022-05-02 RX ADMIN — LOSARTAN POTASSIUM 25 MG: 25 TABLET, FILM COATED ORAL at 10:05

## 2022-05-02 RX ADMIN — PIPERACILLIN SODIUM AND TAZOBACTAM SODIUM 4.5 G: 4; .5 INJECTION, POWDER, LYOPHILIZED, FOR SOLUTION INTRAVENOUS at 01:05

## 2022-05-02 NOTE — PLAN OF CARE
Problem: Adult Inpatient Plan of Care  Goal: Patient-Specific Goal (Individualized)  Outcome: Ongoing, Progressing  Goal: Absence of Hospital-Acquired Illness or Injury  Outcome: Ongoing, Progressing  Goal: Optimal Comfort and Wellbeing  Outcome: Ongoing, Progressing  Goal: Readiness for Transition of Care  Outcome: Ongoing, Progressing     Problem: Infection  Goal: Absence of Infection Signs and Symptoms  Outcome: Ongoing, Progressing     AAOx4. Pt remains on IV abx. No adverse reaction noted. No c/o pain or discomfort noted. Pt still passing blood and clots in urine. Instructed to call for assistance

## 2022-05-02 NOTE — SUBJECTIVE & OBJECTIVE
Interval History: Remained afebrile overnight. Denies vision changes and dysuria.    Review of Systems   Constitutional:  Negative for chills and fever.   HENT:  Negative for congestion and trouble swallowing.    Eyes:  Negative for visual disturbance.   Respiratory:  Negative for cough and shortness of breath.    Gastrointestinal:  Negative for abdominal pain, diarrhea, nausea and vomiting.   Genitourinary:  Negative for difficulty urinating, dysuria and hematuria.   Musculoskeletal:  Negative for arthralgias, back pain and myalgias.   Skin:  Negative for rash and wound.   Allergic/Immunologic: Negative for immunocompromised state.   Neurological:  Negative for dizziness and headaches.   Psychiatric/Behavioral:  Negative for agitation and confusion.    Objective:     Vital Signs (Most Recent):  Temp: 98.3 °F (36.8 °C) (05/02/22 0815)  Pulse: 65 (05/02/22 0815)  Resp: 16 (05/02/22 0815)  BP: (!) 121/56 (05/02/22 0815)  SpO2: (!) 90 % (05/02/22 0815)   Vital Signs (24h Range):  Temp:  [97.6 °F (36.4 °C)-100.3 °F (37.9 °C)] 98.3 °F (36.8 °C)  Pulse:  [58-77] 65  Resp:  [14-20] 16  SpO2:  [86 %-94 %] 90 %  BP: (100-132)/(53-61) 121/56     Weight: 75.8 kg (167 lb)  Body mass index is 28.67 kg/m².    Estimated Creatinine Clearance: 24.3 mL/min (A) (based on SCr of 1.9 mg/dL (H)).    Physical Exam  Vitals reviewed.   Constitutional:       Appearance: Normal appearance.   HENT:      Head: Normocephalic and atraumatic.   Eyes:      Pupils: Pupils are equal, round, and reactive to light.   Cardiovascular:      Rate and Rhythm: Normal rate and regular rhythm.   Pulmonary:      Effort: Pulmonary effort is normal. No respiratory distress.      Breath sounds: Normal breath sounds.   Abdominal:      General: Abdomen is flat. Bowel sounds are normal.      Palpations: Abdomen is soft.      Tenderness: There is no abdominal tenderness.   Musculoskeletal:         General: Normal range of motion.      Cervical back: Normal range of  motion and neck supple.      Right lower leg: No edema.      Left lower leg: No edema.   Skin:     General: Skin is warm.      Coloration: Skin is not jaundiced.      Findings: No lesion.   Neurological:      General: No focal deficit present.      Mental Status: She is alert and oriented to person, place, and time.   Psychiatric:         Mood and Affect: Mood normal.         Behavior: Behavior normal.       Significant Labs: All pertinent labs within the past 24 hours have been reviewed.    Significant Imaging: I have reviewed all pertinent imaging results/findings within the past 24 hours.

## 2022-05-02 NOTE — HPI
Misa Barajas is a 76 y.o. female with medical history of COPD, CRF on O2, Diastolic HF (EF 65%), Parox AFib on Eliquis, HTN, HLD, CKD, Hx of Breast cancer s/p L mastectomy who was admitted to the hospital for an infected kidney stone. Patient reports 5 days of hematuria with chills. No reported fever. Concerned she is having a side effect of her Eliquis. Denies abdominal pain, flank pain, dysuria, diarrhea, constipation. CT of the abdomen obtained in the ED showed severe right hydroureteronephrosis to the level of a 1.3cm right distal ureteral stone.UA nitrite negative, >100 RBC, 21 WBC, no bacteria. Fevers were noted. Urology was consulted and the patient was taken to the OR for cystoscopy with R ureteral stent placement.   Blood cultures grew fungus. Nephrology has been consulted for evaluation of MARQUEZ.

## 2022-05-02 NOTE — ASSESSMENT & PLAN NOTE
MARQUEZ on CKD   Bl sCr around 1.1-1.4; up to 2.1 during this admission  Renal function  Mildly improved with Cr down to 1.9 today.   KUS noted; hydro improving (after R ureteral stent placement); bl nephrolithiasis   Likely MARQUEZ vs  ATN; now recovering   RFP daily   Strict I/Os   Avoid nephrotoxic agents including NSAIDs, IV contrast.   Renally dose medications to GFR  Will need to follow up in the outpatient setting with  for definitive management of her 1.3cm stone along with her Nephrologist for kidney stone evaluation.

## 2022-05-02 NOTE — SUBJECTIVE & OBJECTIVE
Past Medical History:   Diagnosis Date    Acute hypoxemic respiratory failure 6/26/2021    Aortic atherosclerosis     Arthritis     knee joint pain    Breast cancer 2002    left breast & lymph nodes-s/p sx with chemo    Bronchitis     with flu-2/2014    Cataracts, bilateral     Chronic diastolic heart failure 12/24/2019    Chronic kidney disease, stage 3     History of chemotherapy     last treatment 12/2002 (had 8 treatments)    Hyperlipidemia 11/20/2016    Hypertension     Osteoporosis     Paroxysmal atrial fibrillation 6/7/2021    Renal calculi     hematuria    Vitamin D insufficiency        Past Surgical History:   Procedure Laterality Date    BREAST BIOPSY Left 2002    core bx, +    BREAST BIOPSY Right 2018    core    BREAST BIOPSY Right 2019    BREAST LUMPECTOMY Left 2002    CYSTOSCOPY  4/28/2022    Procedure: CYSTOSCOPY;  Surgeon: Vik Ware MD;  Location: [facility] OR [address] [place] [address];  Service: Urology;;    LITHOTRIPSY      MASTECTOMY Left 06/2002    left-& lymph node dissection    RETROGRADE PYELOGRAPHY Right 4/28/2022    Procedure: PYELOGRAM, RETROGRADE;  Surgeon: Vik Ware MD;  Location: [facility] OR [address] [place] [address];  Service: Urology;  Laterality: Right;    ROBOT-ASSISTED LAPAROSCOPIC PARTIAL NEPHRECTOMY USING DA JESÚS XI Right 3/11/2021    Procedure: XI ROBOTIC NEPHRECTOMY, PARTIAL;  Surgeon: Taz Ortega MD;  [facility];  Service: Urology;  Laterality: Right;  4hr/ gen with regional  [place] confirmation:  [id number] for 11:15am case NC    ureteroscopy Bilateral     6.14       Review of patient's allergies indicates:   Allergen Reactions    Adhesive Rash     Pt states she removed a LATEX bandaid and the skin beneath was swollen and red. No other latex causes a reaction.     Current Facility-Administered Medications   Medication Frequency    acetaminophen tablet 1,000 mg Q8H PRN    apixaban tablet 2.5 mg BID    aspirin EC tablet 81 mg Daily    atorvastatin tablet 20 mg QHS    dextrose 10%  bolus 125 mL PRN    dextrose 10% bolus 250 mL PRN    fluticasone furoate-vilanteroL 100-25 mcg/dose diskus inhaler 1 puff Daily    glucagon (human recombinant) injection 1 mg PRN    glucose chewable tablet 16 g PRN    glucose chewable tablet 24 g PRN    losartan split tablet 25 mg Daily    metoprolol succinate (TOPROL-XL) 24 hr tablet 100 mg Daily    micafungin 100 mg in sodium chloride 0.9 % 100 mL IVPB (ready to mix system) Q24H    morphine injection 2 mg Q4H PRN    morphine injection 4 mg Q4H PRN    mupirocin 2 % ointment BID    naloxone 0.4 mg/mL injection 0.02 mg PRN    oxybutynin tablet 5 mg TID    piperacillin-tazobactam 4.5 g in sodium chloride 0.9% 100 mL IVPB (ready to mix system) Q8H    sodium chloride 0.9% flush 3 mL PRN     Family History       Problem Relation (Age of Onset)    Arthritis Mother, Sister    Bone cancer Mother    Breast cancer Mother, Sister    Cancer Father    Crohn's disease Daughter    Fibroids Daughter    No Known Problems Brother, Daughter          Tobacco Use    Smoking status: Former Smoker     Packs/day: 1.00     Years: 50.00     Pack years: 50.00     Types: Cigarettes     Start date:      Quit date: 2009     Years since quittin.9    Smokeless tobacco: Never Used   Substance and Sexual Activity    Alcohol use: No    Drug use: No    Sexual activity: Not Currently     Partners: Male     Birth control/protection: Partner-Vasectomy     Review of Systems   Constitutional:  Positive for fever. Negative for activity change.   Respiratory:  Negative for chest tightness and shortness of breath.    Cardiovascular:  Negative for chest pain and leg swelling.   Gastrointestinal:  Negative for abdominal distention, abdominal pain, diarrhea, nausea and vomiting.   Genitourinary:  Negative for dysuria, flank pain and hematuria.   Objective:     Vital Signs (Most Recent):  Temp: 98.1 °F (36.7 °C) (22 1245)  Pulse: 63 (22 124)  Resp: 18 (22 124)  BP: (!) 124/57  (05/02/22 1245)  SpO2: (!) 94 % (05/02/22 1245)  O2 Device (Oxygen Therapy): nasal cannula (05/02/22 1245)   Vital Signs (24h Range):  Temp:  [97.6 °F (36.4 °C)-100.3 °F (37.9 °C)] 98.1 °F (36.7 °C)  Pulse:  [58-77] 63  Resp:  [14-20] 18  SpO2:  [90 %-94 %] 94 %  BP: (100-132)/(53-61) 124/57     Weight: 75.8 kg (167 lb) (04/28/22 1117)  Body mass index is 28.67 kg/m².  Body surface area is 1.85 meters squared.    I/O last 3 completed shifts:  In: 760 [P.O.:560; IV Piggyback:200]  Out: -     Physical Exam  HENT:      Head: Normocephalic and atraumatic.      Nose: Nose normal.      Mouth/Throat:      Mouth: Mucous membranes are moist.   Eyes:      Pupils: Pupils are equal, round, and reactive to light.   Cardiovascular:      Rate and Rhythm: Normal rate.   Pulmonary:      Effort: Pulmonary effort is normal. No respiratory distress.   Abdominal:      General: There is no distension.      Palpations: Abdomen is soft.   Musculoskeletal:         General: No swelling. Normal range of motion.      Right lower leg: No edema.      Left lower leg: No edema.   Skin:     General: Skin is warm.   Neurological:      Mental Status: She is alert and oriented to person, place, and time.   Psychiatric:         Mood and Affect: Mood normal.         Behavior: Behavior normal.       Significant Labs:  CBC:   Recent Labs   Lab 04/29/22  1408   WBC 4.41   RBC 4.22   HGB 12.7   HCT 39.7   *   MCV 94   MCH 30.1   MCHC 32.0     CMP:   Recent Labs   Lab 04/29/22  1408 04/30/22  0526 05/02/22  0529   *   < > 88   CALCIUM 9.2   < > 9.5   ALBUMIN 2.4*  --   --    PROT 5.4*  --   --       < > 134*   K 3.9   < > 3.5   CO2 28   < > 23      < > 103   BUN 32*   < > 32*   CREATININE 2.1*   < > 1.9*   ALKPHOS 69  --   --    ALT 30  --   --    AST 50*  --   --    BILITOT 1.7*  --   --     < > = values in this interval not displayed.

## 2022-05-02 NOTE — PROGRESS NOTES
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 Febrile on admit with pyuria on UA, but negative urine culture. CT revealed severe rt hydronephrosis with perinephric stranding and inflammatory change surrounding the ureter with a 7mm stone at the distal ureter. Underwent cystoscopy with rt ureteral stent placement 4/29 that revealed pyonephrosis. 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 Suspect due to urinary source in the setting of obstructing stone, however UCx remained neg.     Recommendations:  --continue micafungin for c glabrata pending susceptibilities  --TTE to r/o IE  --if develops vision changes will need ophtha eval  --continue pip-tazo to complete a 7 day course for pyelo  --will need uro f/u outpatient for definitive stone management.        Anticipated Disposition: tbd    Thank you for your consult. I will follow-up with patient. Please contact us if you have any additional questions.    Kamille Velarde MD  Infectious Disease  [facility] - Intensive Care ([place] [address])    Subjective:     Principal Problem:Right ureteral stone    HPI: Ms. 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 Febrile on admit with pyuria on UA, but negative urine culture. Underwent cystoscopy with rt ureteral stent placement 4/29. ID consulted for fungemia. Denies recent diarrhea, N/V abdominal pain, diarrhea, dysuria. No vision changes. Does not have a port.    Interval History: Remained afebrile overnight. Denies vision changes and dysuria.    Review of Systems   Constitutional:  Negative for chills and fever.   HENT:  Negative for congestion and trouble swallowing.    Eyes:  Negative for visual disturbance.   Respiratory:  Negative for cough and shortness of breath.    Gastrointestinal:  Negative for abdominal pain, diarrhea, nausea and vomiting.   Genitourinary:  Negative for difficulty urinating, dysuria and hematuria.   Musculoskeletal:  Negative for arthralgias, back pain and myalgias.   Skin:  Negative for rash and wound.   Allergic/Immunologic: Negative for immunocompromised state.   Neurological:  Negative for dizziness and headaches.   Psychiatric/Behavioral:  Negative for agitation and confusion.    Objective:     Vital Signs (Most Recent):  Temp: 98.3 °F (36.8 °C) (05/02/22 0815)  Pulse: 65 (05/02/22 0815)  Resp: 16 (05/02/22 0815)  BP: (!) 121/56 (05/02/22 0815)  SpO2: (!) 90 % (05/02/22 0815)   Vital Signs (24h Range):  Temp:  [97.6 °F (36.4 °C)-100.3 °F (37.9 °C)] 98.3 °F (36.8 °C)  Pulse:  [58-77] 65  Resp:  [14-20] 16  SpO2:  [86 %-94 %] 90 %  BP: (100-132)/(53-61) 121/56     Weight: 75.8 kg (167 lb)  Body mass index is 28.67 kg/m².    Estimated Creatinine Clearance: 24.3 mL/min (A) (based on SCr of 1.9 mg/dL (H)).    Physical Exam  Vitals reviewed.   Constitutional:       Appearance: Normal appearance.   HENT:      Head: Normocephalic and atraumatic.   Eyes:      Pupils: Pupils are equal, round, and reactive to light.   Cardiovascular:      Rate and Rhythm: Normal rate and regular rhythm.   Pulmonary:      Effort: Pulmonary effort is normal. No respiratory  distress.      Breath sounds: Normal breath sounds.   Abdominal:      General: Abdomen is flat. Bowel sounds are normal.      Palpations: Abdomen is soft.      Tenderness: There is no abdominal tenderness.   Musculoskeletal:         General: Normal range of motion.      Cervical back: Normal range of motion and neck supple.      Right lower leg: No edema.      Left lower leg: No edema.   Skin:     General: Skin is warm.      Coloration: Skin is not jaundiced.      Findings: No lesion.   Neurological:      General: No focal deficit present.      Mental Status: She is alert and oriented to person, place, and time.   Psychiatric:         Mood and Affect: Mood normal.         Behavior: Behavior normal.       Significant Labs: All pertinent labs within the past 24 hours have been reviewed.    Significant Imaging: I have reviewed all pertinent imaging results/findings within the past 24 hours.

## 2022-05-02 NOTE — PROGRESS NOTES
[facility] ([address] Medicine  Progress Note    Patient Name: Misa Barajas  MRN: [id number]  Patient Class: IP- Inpatient   Admission Date: 4/28/2022  Length of Stay: 4 days  Attending Physician: Celina Pereira MD  Primary Care Provider: Celia Carlisle MD        Subjective:     Principal Problem:Right ureteral stone        HPI:  Misa Barajas is a 76 y.o. female with medical history of COPD, CRF on O2, Diastolic HF (EF 65%), Parox AFib on Eliquis, HTN, HLD, CKD, Hx of Breast cancer s/p L mastectomy who was admitted to the hospital for an infected kidney stone. Patient reports 5 days of hematuria with chills. No reported fever. Concerned she is having a side effect of her Eliquis. Denies abdominal pain, flank pain, dysuria, diarrhea, constipation.   CT RSS obtained in the ED showed severe right hydroureteronephrosis to the level of a 1.3cm right distal ureteral stone. WBC 7.7. Cr 2.0 (BL 1.2). UA nitrite negative, >100 RBC, 21 WBC, no bacteria. Febrile to 101.5 in the ED. Urology was consulted. She was taken to the OR for class A cystoscopy with right ureteral stent placement.  She was seen in the PACU and has no current pain.       Overview/Hospital Course:  No notes on file    Interval History: NAEON. Doing well.     Review of Systems   Constitutional:  Negative for chills and fever.   Respiratory:  Negative for shortness of breath.    Cardiovascular:  Negative for chest pain.   Gastrointestinal:  Negative for abdominal pain.   Genitourinary:  Negative for dysuria.   Neurological:  Negative for headaches.   Psychiatric/Behavioral:  Negative for confusion.      Objective:     Vital Signs (Most Recent):  Temp: 98.8 °F (37.1 °C) (05/02/22 1602)  Pulse: 72 (05/02/22 1602)  Resp: 18 (05/02/22 1602)  BP: (!) 142/65 (05/02/22 1602)  SpO2: (!) 93 % (05/02/22 1602)   Vital Signs (24h Range):  Temp:  [97.6 °F (36.4 °C)-100.3 °F (37.9 °C)] 98.8 °F (37.1 °C)  Pulse:  [58-77] 72  Resp:   [14-18] 18  SpO2:  [90 %-94 %] 93 %  BP: (100-142)/(53-65) 142/65     Weight: 75.8 kg (167 lb)  Body mass index is 28.67 kg/m².    Intake/Output Summary (Last 24 hours) at 5/2/2022 1642  Last data filed at 5/2/2022 0901  Gross per 24 hour   Intake 660 ml   Output --   Net 660 ml        Physical Exam  Vitals and nursing note reviewed.   Constitutional:       General: She is not in acute distress.     Appearance: She is well-developed.   HENT:      Head: Normocephalic and atraumatic.   Eyes:      Conjunctiva/sclera: Conjunctivae normal.   Neck:      Vascular: No JVD.   Cardiovascular:      Rate and Rhythm: Normal rate and regular rhythm.      Heart sounds: Normal heart sounds.   Pulmonary:      Effort: Pulmonary effort is normal.      Breath sounds: Normal breath sounds.   Abdominal:      General: Bowel sounds are normal. There is no distension.      Palpations: Abdomen is soft.   Musculoskeletal:      Cervical back: Neck supple.      Right lower leg: No edema.      Left lower leg: No edema.   Neurological:      Mental Status: She is alert.   Psychiatric:         Behavior: Behavior normal.       Significant Labs: All pertinent labs within the past 24 hours have been reviewed.  CBC:   No results for input(s): WBC, HGB, HCT, PLT in the last 48 hours.    CMP:   Recent Labs   Lab 05/01/22  0502 05/02/22  0529    134*   K 3.8 3.5    103   CO2 22* 23   GLU 83 88   BUN 33* 32*   CREATININE 2.1* 1.9*   CALCIUM 9.2 9.5   ANIONGAP 12 8   EGFRNONAA 22.1* 24.9*       Urine Culture: No results for input(s): LABURIN in the last 48 hours.    Significant Imaging: I have reviewed all pertinent imaging results/findings within the past 24 hours.      Assessment/Plan:      R renal stone  R hydronephrosis and hydroureter  Pyelonephritis   - s/p stent placement. Continue IV abx for pyuria and pyonephrosis seen during procedure. Continue anti pyretics prn. Oxybutynin for spasms.   - urine cultures negative x2.  - Patient will  need to be on Zosyn for 7 days per ID  - outpatient ureteroscopy in a couple weeks for definitive stone management.        Fungemia  ID following. 2 D echo requested. Repeat blood Cx's pending.      MARQUEZ on CKD III  Secondary to obstructive stone. mild improvement today since stent placement. Repeat renal u/s with improvement in hydronephrosis.   Nephrology following. Appreciate assistance.   Renally dose meds for Estimated Creatinine Clearance: 24.3 mL/min (A) (based on SCr of 1.9 mg/dL (H)).         COPD  Chronic respiratory failure secondary to hypoxia  - only uses her O2 prn at home. Will monitor  - Breo and prn inh tx     Chronic diastolic CHF  Stable. Continue home meds. Monitor fluids.      PAF  Stable. Continue BB and Eliquis.      HTN  Stable. Monitor in setting of systemic infection. Held BB this morning.     VTE Risk Mitigation (From admission, onward)         Ordered     apixaban tablet 2.5 mg  2 times daily         04/29/22 1209     IP VTE HIGH RISK PATIENT  Once         04/28/22 1723     Place sequential compression device  Until discontinued         04/28/22 1723                Discharge Planning   NOHEMI: 5/5/2022     Code Status: Full Code   Is the patient medically ready for discharge?:     Reason for patient still in hospital (select all that apply): Patient trending condition, Laboratory test, Treatment and Consult recommendations  Discharge Plan A: Home          Celina Pereira MD  Department of Hospital Medicine   [facility] - Intensive Care ([place]-[facility])

## 2022-05-02 NOTE — PLAN OF CARE
Problem: Adult Inpatient Plan of Care  Goal: Patient-Specific Goal (Individualized)  Outcome: Ongoing, Progressing  Goal: Absence of Hospital-Acquired Illness or Injury  Outcome: Ongoing, Progressing  Goal: Optimal Comfort and Wellbeing  Outcome: Ongoing, Progressing  Goal: Readiness for Transition of Care  Outcome: Ongoing, Progressing     Problem: Infection  Goal: Absence of Infection Signs and Symptoms  Outcome: Ongoing, Progressing   Fall precaution maintained this shift call bell in reach. Bed in low position. Instructed pt to call for assistance. No acute distress noted over night. Vs stable. All concerns addressed and answered.

## 2022-05-02 NOTE — ASSESSMENT & PLAN NOTE
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 Febrile on admit with pyuria on UA, but negative urine culture. CT revealed severe rt hydronephrosis with perinephric stranding and inflammatory change surrounding the ureter with a 7mm stone at the distal ureter. Underwent cystoscopy with rt ureteral stent placement 4/29 that revealed pyonephrosis. 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 Suspect due to urinary source in the setting of obstructing stone, however UCx remained neg.     Recommendations:  --continue micafungin for c glabrata pending susceptibilities  --TTE to r/o IE  --if develops vision changes will need ophtha eval  --continue pip-tazo to complete a 7 day course for pyelo

## 2022-05-02 NOTE — CONSULTS
[facility] ([place] [address])  Nephrology  Consult Note    Patient Name: Misa Barajas  MRN: [id number]  Admission Date: 4/28/2022  Hospital Length of Stay: 4 days  Attending Provider: Celina Pereira MD   Primary Care Physician: Celia Carlisle MD  Principal Problem:Right ureteral stone    Inpatient consult to Nephrology  Consult performed by: Dayday Galvez MD  Consult ordered by: Celina Pereira MD  Reason for consult: MARQUEZ, kidney stones         Subjective:     HPI: Misa Barajas is a 76 y.o. female with medical history of COPD, CRF on O2, Diastolic HF (EF 65%), Parox AFib on Eliquis, HTN, HLD, CKD, Hx of Breast cancer s/p L mastectomy who was admitted to the hospital for an infected kidney stone. Patient reports 5 days of hematuria with chills. No reported fever. Concerned she is having a side effect of her Eliquis. Denies abdominal pain, flank pain, dysuria, diarrhea, constipation. CT of the abdomen obtained in the ED showed severe right hydroureteronephrosis to the level of a 1.3cm right distal ureteral stone.UA nitrite negative, >100 RBC, 21 WBC, no bacteria. Fevers were noted. Urology was consulted and the patient was taken to the OR for cystoscopy with R ureteral stent placement.   Blood cultures grew fungus. Nephrology has been consulted for evaluation of MARQUEZ.       Past Medical History:   Diagnosis Date    Acute hypoxemic respiratory failure 6/26/2021    Aortic atherosclerosis     Arthritis     knee joint pain    Breast cancer 2002    left breast & lymph nodes-s/p sx with chemo    Bronchitis     with flu-2/2014    Cataracts, bilateral     Chronic diastolic heart failure 12/24/2019    Chronic kidney disease, stage 3     History of chemotherapy     last treatment 12/2002 (had 8 treatments)    Hyperlipidemia 11/20/2016    Hypertension     Osteoporosis     Paroxysmal atrial fibrillation 6/7/2021    Renal calculi     hematuria    Vitamin D insufficiency        Past  Surgical History:   Procedure Laterality Date    BREAST BIOPSY Left 2002    core bx, +    BREAST BIOPSY Right 2018    core    BREAST BIOPSY Right 2019    BREAST LUMPECTOMY Left 2002    CYSTOSCOPY  4/28/2022    Procedure: CYSTOSCOPY;  Surgeon: Vik Ware MD;  [facility];  Service: Urology;;    LITHOTRIPSY      MASTECTOMY Left 06/2002    left-& lymph node dissection    RETROGRADE PYELOGRAPHY Right 4/28/2022    Procedure: PYELOGRAM, RETROGRADE;  Surgeon: Vik Ware MD;  [facility];  Service: Urology;  Laterality: Right;    ROBOT-ASSISTED LAPAROSCOPIC PARTIAL NEPHRECTOMY USING DA JESÚS XI Right 3/11/2021    Procedure: XI ROBOTIC NEPHRECTOMY, PARTIAL;  Surgeon: Taz Ortega MD;  [facility];  Service: Urology;  Laterality: Right;  4hr/ gen with regional  [place] confirmation:  [id number] for 11:15am case [place]    ureteroscopy Bilateral     6.14       Review of patient's allergies indicates:   Allergen Reactions    Adhesive Rash     Pt states she removed a LATEX bandaid and the skin beneath was swollen and red. No other latex causes a reaction.     Current Facility-Administered Medications   Medication Frequency    acetaminophen tablet 1,000 mg Q8H PRN    apixaban tablet 2.5 mg BID    aspirin EC tablet 81 mg Daily    atorvastatin tablet 20 mg QHS    dextrose 10% bolus 125 mL PRN    dextrose 10% bolus 250 mL PRN    fluticasone furoate-vilanteroL 100-25 mcg/dose diskus inhaler 1 puff Daily    glucagon (human recombinant) injection 1 mg PRN    glucose chewable tablet 16 g PRN    glucose chewable tablet 24 g PRN    losartan split tablet 25 mg Daily    metoprolol succinate (TOPROL-XL) 24 hr tablet 100 mg Daily    micafungin 100 mg in sodium chloride 0.9 % 100 mL IVPB (ready to mix system) Q24H    morphine injection 2 mg Q4H PRN    morphine injection 4 mg Q4H PRN    mupirocin 2 % ointment BID    naloxone 0.4 mg/mL injection 0.02 mg PRN     oxybutynin tablet 5 mg TID    piperacillin-tazobactam 4.5 g in sodium chloride 0.9% 100 mL IVPB (ready to mix system) Q8H    sodium chloride 0.9% flush 3 mL PRN     Family History       Problem Relation (Age of Onset)    Arthritis Mother, Sister    Bone cancer Mother    Breast cancer Mother, Sister    Cancer Father    Crohn's disease Daughter    Fibroids Daughter    No Known Problems Brother, Daughter          Tobacco Use    Smoking status: Former Smoker     Packs/day: 1.00     Years: 50.00     Pack years: 50.00     Types: Cigarettes     Start date:      Quit date: 2009     Years since quittin.9    Smokeless tobacco: Never Used   Substance and Sexual Activity    Alcohol use: No    Drug use: No    Sexual activity: Not Currently     Partners: Male     Birth control/protection: Partner-Vasectomy     Review of Systems   Constitutional:  Positive for fever. Negative for activity change.   Respiratory:  Negative for chest tightness and shortness of breath.    Cardiovascular:  Negative for chest pain and leg swelling.   Gastrointestinal:  Negative for abdominal distention, abdominal pain, diarrhea, nausea and vomiting.   Genitourinary:  Negative for dysuria, flank pain and hematuria.   Objective:     Vital Signs (Most Recent):  Temp: 98.1 °F (36.7 °C) (22 1245)  Pulse: 63 (22 1245)  Resp: 18 (22 1245)  BP: (!) 124/57 (22 1245)  SpO2: (!) 94 % (22 1245)  O2 Device (Oxygen Therapy): nasal cannula (22 1245)   Vital Signs (24h Range):  Temp:  [97.6 °F (36.4 °C)-100.3 °F (37.9 °C)] 98.1 °F (36.7 °C)  Pulse:  [58-77] 63  Resp:  [14-20] 18  SpO2:  [90 %-94 %] 94 %  BP: (100-132)/(53-61) 124/57     Weight: 75.8 kg (167 lb) (22 1117)  Body mass index is 28.67 kg/m².  Body surface area is 1.85 meters squared.    I/O last 3 completed shifts:  In: 760 [P.O.:560; IV Piggyback:200]  Out: -     Physical Exam  HENT:      Head: Normocephalic and atraumatic.      Nose: Nose  normal.      Mouth/Throat:      Mouth: Mucous membranes are moist.   Eyes:      Pupils: Pupils are equal, round, and reactive to light.   Cardiovascular:      Rate and Rhythm: Normal rate.   Pulmonary:      Effort: Pulmonary effort is normal. No respiratory distress.   Abdominal:      General: There is no distension.      Palpations: Abdomen is soft.   Musculoskeletal:         General: No swelling. Normal range of motion.      Right lower leg: No edema.      Left lower leg: No edema.   Skin:     General: Skin is warm.   Neurological:      Mental Status: She is alert and oriented to person, place, and time.   Psychiatric:         Mood and Affect: Mood normal.         Behavior: Behavior normal.       Significant Labs:  CBC:   Recent Labs   Lab 04/29/22  1408   WBC 4.41   RBC 4.22   HGB 12.7   HCT 39.7   *   MCV 94   MCH 30.1   MCHC 32.0     CMP:   Recent Labs   Lab 04/29/22  1408 04/30/22  0526 05/02/22  0529   *   < > 88   CALCIUM 9.2   < > 9.5   ALBUMIN 2.4*  --   --    PROT 5.4*  --   --       < > 134*   K 3.9   < > 3.5   CO2 28   < > 23      < > 103   BUN 32*   < > 32*   CREATININE 2.1*   < > 1.9*   ALKPHOS 69  --   --    ALT 30  --   --    AST 50*  --   --    BILITOT 1.7*  --   --     < > = values in this interval not displayed.         Assessment/Plan:     * Right ureteral stone  S/p cystoscopy and stent placement by      MARQUEZ (acute kidney injury)  MARQUEZ on CKD   Bl sCr around 1.1-1.4; up to 2.1 during this admission  Renal function  Mildly improved with Cr down to 1.9 today.   KUS noted; hydro improving (after R ureteral stent placement); bl nephrolithiasis   Likely MARQUEZ vs  ATN; now recovering   RFP daily   Strict I/Os   Avoid nephrotoxic agents including NSAIDs, IV contrast.   Renally dose medications to GFR  Will need to follow up in the outpatient setting with  for definitive management of her 1.3cm stone along with her Nephrologist for kidney stone evaluation.      Fungemia  Management per primary         Thank you for your consult. I will follow-up with patient. Please contact us if you have any additional questions.    Dayday Galvez MD  Nephrology  [facility] - Intensive Care ([facility]

## 2022-05-02 NOTE — SUBJECTIVE & OBJECTIVE
Interval History: NAEON. Doing well.     Review of Systems   Constitutional:  Negative for chills and fever.   Respiratory:  Negative for shortness of breath.    Cardiovascular:  Negative for chest pain.   Gastrointestinal:  Negative for abdominal pain.   Genitourinary:  Negative for dysuria.   Neurological:  Negative for headaches.   Psychiatric/Behavioral:  Negative for confusion.      Objective:     Vital Signs (Most Recent):  Temp: 98.8 °F (37.1 °C) (05/02/22 1602)  Pulse: 72 (05/02/22 1602)  Resp: 18 (05/02/22 1602)  BP: (!) 142/65 (05/02/22 1602)  SpO2: (!) 93 % (05/02/22 1602)   Vital Signs (24h Range):  Temp:  [97.6 °F (36.4 °C)-100.3 °F (37.9 °C)] 98.8 °F (37.1 °C)  Pulse:  [58-77] 72  Resp:  [14-18] 18  SpO2:  [90 %-94 %] 93 %  BP: (100-142)/(53-65) 142/65     Weight: 75.8 kg (167 lb)  Body mass index is 28.67 kg/m².    Intake/Output Summary (Last 24 hours) at 5/2/2022 1642  Last data filed at 5/2/2022 0901  Gross per 24 hour   Intake 660 ml   Output --   Net 660 ml        Physical Exam  Vitals and nursing note reviewed.   Constitutional:       General: She is not in acute distress.     Appearance: She is well-developed.   HENT:      Head: Normocephalic and atraumatic.   Eyes:      Conjunctiva/sclera: Conjunctivae normal.   Neck:      Vascular: No JVD.   Cardiovascular:      Rate and Rhythm: Normal rate and regular rhythm.      Heart sounds: Normal heart sounds.   Pulmonary:      Effort: Pulmonary effort is normal.      Breath sounds: Normal breath sounds.   Abdominal:      General: Bowel sounds are normal. There is no distension.      Palpations: Abdomen is soft.   Musculoskeletal:      Cervical back: Neck supple.      Right lower leg: No edema.      Left lower leg: No edema.   Neurological:      Mental Status: She is alert.   Psychiatric:         Behavior: Behavior normal.       Significant Labs: All pertinent labs within the past 24 hours have been reviewed.  CBC:   No results for input(s): WBC, HGB,  HCT, PLT in the last 48 hours.    CMP:   Recent Labs   Lab 05/01/22  0502 05/02/22  0529    134*   K 3.8 3.5    103   CO2 22* 23   GLU 83 88   BUN 33* 32*   CREATININE 2.1* 1.9*   CALCIUM 9.2 9.5   ANIONGAP 12 8   EGFRNONAA 22.1* 24.9*       Urine Culture: No results for input(s): LABURIN in the last 48 hours.    Significant Imaging: I have reviewed all pertinent imaging results/findings within the past 24 hours.

## 2022-05-03 LAB
ANION GAP SERPL CALC-SCNC: 11 MMOL/L (ref 8–16)
BUN SERPL-MCNC: 34 MG/DL (ref 8–23)
CALCIUM SERPL-MCNC: 9.4 MG/DL (ref 8.7–10.5)
CHLORIDE SERPL-SCNC: 102 MMOL/L (ref 95–110)
CO2 SERPL-SCNC: 22 MMOL/L (ref 23–29)
CREAT SERPL-MCNC: 2 MG/DL (ref 0.5–1.4)
EST. GFR  (AFRICAN AMERICAN): 27 ML/MIN/1.73 M^2
EST. GFR  (NON AFRICAN AMERICAN): 23.4 ML/MIN/1.73 M^2
GLUCOSE SERPL-MCNC: 96 MG/DL (ref 70–110)
POTASSIUM SERPL-SCNC: 3.3 MMOL/L (ref 3.5–5.1)
SODIUM SERPL-SCNC: 135 MMOL/L (ref 136–145)
TROPONIN I SERPL DL<=0.01 NG/ML-MCNC: 0.03 NG/ML (ref 0–0.03)
TROPONIN I SERPL DL<=0.01 NG/ML-MCNC: 0.03 NG/ML (ref 0–0.03)

## 2022-05-03 PROCEDURE — 99900035 HC TECH TIME PER 15 MIN (STAT)

## 2022-05-03 PROCEDURE — 93010 EKG 12-LEAD: ICD-10-PCS | Mod: ,,, | Performed by: INTERNAL MEDICINE

## 2022-05-03 PROCEDURE — 99232 SBSQ HOSP IP/OBS MODERATE 35: CPT | Mod: ,,, | Performed by: FAMILY MEDICINE

## 2022-05-03 PROCEDURE — 99232 PR SUBSEQUENT HOSPITAL CARE,LEVL II: ICD-10-PCS | Mod: ,,, | Performed by: FAMILY MEDICINE

## 2022-05-03 PROCEDURE — 63600175 PHARM REV CODE 636 W HCPCS: Performed by: FAMILY MEDICINE

## 2022-05-03 PROCEDURE — 63600175 PHARM REV CODE 636 W HCPCS: Performed by: STUDENT IN AN ORGANIZED HEALTH CARE EDUCATION/TRAINING PROGRAM

## 2022-05-03 PROCEDURE — 94761 N-INVAS EAR/PLS OXIMETRY MLT: CPT

## 2022-05-03 PROCEDURE — 12000002 HC ACUTE/MED SURGE SEMI-PRIVATE ROOM

## 2022-05-03 PROCEDURE — 99232 SBSQ HOSP IP/OBS MODERATE 35: CPT | Mod: ,,, | Performed by: INTERNAL MEDICINE

## 2022-05-03 PROCEDURE — 63600175 PHARM REV CODE 636 W HCPCS: Performed by: PHYSICIAN ASSISTANT

## 2022-05-03 PROCEDURE — 27000221 HC OXYGEN, UP TO 24 HOURS

## 2022-05-03 PROCEDURE — 36415 COLL VENOUS BLD VENIPUNCTURE: CPT | Performed by: PHYSICIAN ASSISTANT

## 2022-05-03 PROCEDURE — 84484 ASSAY OF TROPONIN QUANT: CPT | Mod: 91 | Performed by: PHYSICIAN ASSISTANT

## 2022-05-03 PROCEDURE — 25000003 PHARM REV CODE 250: Performed by: FAMILY MEDICINE

## 2022-05-03 PROCEDURE — 93005 ELECTROCARDIOGRAM TRACING: CPT

## 2022-05-03 PROCEDURE — 25000003 PHARM REV CODE 250: Performed by: STUDENT IN AN ORGANIZED HEALTH CARE EDUCATION/TRAINING PROGRAM

## 2022-05-03 PROCEDURE — 99232 SBSQ HOSP IP/OBS MODERATE 35: CPT | Mod: GC,,, | Performed by: STUDENT IN AN ORGANIZED HEALTH CARE EDUCATION/TRAINING PROGRAM

## 2022-05-03 PROCEDURE — 99232 PR SUBSEQUENT HOSPITAL CARE,LEVL II: ICD-10-PCS | Mod: ,,, | Performed by: INTERNAL MEDICINE

## 2022-05-03 PROCEDURE — 93010 ELECTROCARDIOGRAM REPORT: CPT | Mod: ,,, | Performed by: INTERNAL MEDICINE

## 2022-05-03 PROCEDURE — 63600175 PHARM REV CODE 636 W HCPCS: Mod: JG | Performed by: INTERNAL MEDICINE

## 2022-05-03 PROCEDURE — 99232 PR SUBSEQUENT HOSPITAL CARE,LEVL II: ICD-10-PCS | Mod: GC,,, | Performed by: STUDENT IN AN ORGANIZED HEALTH CARE EDUCATION/TRAINING PROGRAM

## 2022-05-03 PROCEDURE — 80048 BASIC METABOLIC PNL TOTAL CA: CPT | Performed by: FAMILY MEDICINE

## 2022-05-03 RX ORDER — ONDANSETRON 2 MG/ML
4 INJECTION INTRAMUSCULAR; INTRAVENOUS EVERY 8 HOURS PRN
Status: DISCONTINUED | OUTPATIENT
Start: 2022-05-03 | End: 2022-05-06 | Stop reason: HOSPADM

## 2022-05-03 RX ADMIN — FLUTICASONE FUROATE AND VILANTEROL TRIFENATATE 1 PUFF: 100; 25 POWDER RESPIRATORY (INHALATION) at 09:05

## 2022-05-03 RX ADMIN — ASPIRIN 81 MG: 81 TABLET, COATED ORAL at 09:05

## 2022-05-03 RX ADMIN — PIPERACILLIN SODIUM AND TAZOBACTAM SODIUM 4.5 G: 4; .5 INJECTION, POWDER, LYOPHILIZED, FOR SOLUTION INTRAVENOUS at 05:05

## 2022-05-03 RX ADMIN — OXYBUTYNIN CHLORIDE 5 MG: 5 TABLET ORAL at 09:05

## 2022-05-03 RX ADMIN — ATORVASTATIN CALCIUM 20 MG: 20 TABLET, FILM COATED ORAL at 09:05

## 2022-05-03 RX ADMIN — APIXABAN 2.5 MG: 2.5 TABLET, FILM COATED ORAL at 09:05

## 2022-05-03 RX ADMIN — PIPERACILLIN SODIUM AND TAZOBACTAM SODIUM 4.5 G: 4; .5 INJECTION, POWDER, LYOPHILIZED, FOR SOLUTION INTRAVENOUS at 01:05

## 2022-05-03 RX ADMIN — OXYBUTYNIN CHLORIDE 5 MG: 5 TABLET ORAL at 03:05

## 2022-05-03 RX ADMIN — ONDANSETRON 4 MG: 2 INJECTION INTRAMUSCULAR; INTRAVENOUS at 01:05

## 2022-05-03 RX ADMIN — MICAFUNGIN SODIUM 100 MG: 100 INJECTION, POWDER, LYOPHILIZED, FOR SOLUTION INTRAVENOUS at 12:05

## 2022-05-03 RX ADMIN — MUPIROCIN: 20 OINTMENT TOPICAL at 09:05

## 2022-05-03 NOTE — PROGRESS NOTES
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 Febrile on admit with pyuria on UA, but negative urine culture. CT revealed severe rt hydronephrosis with perinephric stranding and inflammatory change surrounding the ureter with a 7mm stone at the distal ureter. Underwent cystoscopy with rt ureteral stent placement 4/29 that revealed pyonephrosis. 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 Suspect due to urinary source in the setting of obstructing stone, however UCx remained neg.     Recommendations:  --Continue IV micafungin for C glabrata pending susceptibilities  --If susceptibility profile reveals that C glabrata is sensitive to fluconazole, recommend switching to PO fluconazole 400 mg daily to complete 14 day fungemia course from date of clearance (stop date 5/14/22)  --TTE to r/o IE  --If develops vision changes will need ophthalmology eval  --Continue pip-tazo to complete a 10 day course for pyelonephritis; can switch to PO levofloxacin 750 mg daily at discharge; stop date 5/11/22  --If vegetation is seen on TTE, please call us back        Thank you for your consult. I will sign off. Please contact us if you have any additional questions.    J Luis Phillips, DO  Infectious Disease  [facility] ([place] [address])    Subjective:     Principal Problem:Right ureteral stone    HPI: Ms. 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 Febrile on admit with pyuria on UA, but negative urine culture. Underwent cystoscopy with rt ureteral stent placement 4/29. ID consulted for fungemia. Denies recent diarrhea, N/V abdominal pain, diarrhea, dysuria. No vision changes. Does not have a port.    Interval History: Patient seen at bedside. Appears fatigued today and unable to provide much history. However she denies any new symptoms. Just wants to get some sleep.     Review of Systems   Constitutional:  Positive for fatigue. Negative for chills, fever and unexpected weight change.   HENT:  Negative for congestion, postnasal drip and trouble swallowing.    Respiratory:  Negative for cough, chest tightness and shortness of breath.    Cardiovascular:  Negative for chest pain, palpitations and leg swelling.   Gastrointestinal:  Negative for abdominal pain, blood in stool, constipation, diarrhea, nausea and vomiting.   Genitourinary:  Negative for difficulty urinating, dysuria and hematuria.   Musculoskeletal:  Negative for arthralgias and back pain.   Neurological:  Negative for dizziness and light-headedness.   Psychiatric/Behavioral:  Negative for confusion.    Objective:     Vital Signs (Most Recent):  Temp: 97.8 °F (36.6 °C) (05/03/22 1326)  Pulse: 66 (05/03/22 1326)  Resp: 16 (05/03/22 1326)  BP: (!) 112/59 (05/03/22 1326)  SpO2: 95 % (05/03/22 1326)   Vital Signs (24h Range):  Temp:  [97 °F (36.1 °C)-99.9 °F (37.7 °C)] 97.8 °F (36.6 °C)  Pulse:  [44-84] 66  Resp:  [14-20] 16  SpO2:  [91 %-96 %] 95 %  BP: ()/(51-67) 112/59     Weight: 75.8 kg (167 lb)  Body mass index is 28.67 kg/m².    Estimated Creatinine Clearance: 23.1 mL/min (A) (based on SCr of 2 mg/dL (H)).    Physical  Exam  Constitutional:       General: She is not in acute distress.     Appearance: Normal appearance. She is not ill-appearing.   Cardiovascular:      Rate and Rhythm: Normal rate and regular rhythm.      Pulses: Normal pulses.      Heart sounds: Normal heart sounds. No murmur heard.    No friction rub. No gallop.   Pulmonary:      Effort: Pulmonary effort is normal. No respiratory distress.      Breath sounds: Normal breath sounds.   Abdominal:      General: Abdomen is flat. Bowel sounds are normal. There is no distension.      Palpations: Abdomen is soft.      Tenderness: There is no abdominal tenderness.   Skin:     General: Skin is warm and dry.   Neurological:      Mental Status: She is alert.       Significant Labs: All pertinent labs within the past 24 hours have been reviewed.    Significant Imaging: I have reviewed all pertinent imaging results/findings within the past 24 hours.

## 2022-05-03 NOTE — SUBJECTIVE & OBJECTIVE
Interval History: scr stable at 2 today     Review of patient's allergies indicates:   Allergen Reactions    Adhesive Rash     Pt states she removed a LATEX bandaid and the skin beneath was swollen and red. No other latex causes a reaction.     Current Facility-Administered Medications   Medication Frequency    acetaminophen tablet 1,000 mg Q8H PRN    apixaban tablet 2.5 mg BID    aspirin EC tablet 81 mg Daily    atorvastatin tablet 20 mg QHS    dextrose 10% bolus 125 mL PRN    dextrose 10% bolus 250 mL PRN    fluticasone furoate-vilanteroL 100-25 mcg/dose diskus inhaler 1 puff Daily    glucagon (human recombinant) injection 1 mg PRN    glucose chewable tablet 16 g PRN    glucose chewable tablet 24 g PRN    losartan split tablet 25 mg Daily    metoprolol succinate (TOPROL-XL) 24 hr tablet 100 mg Daily    micafungin 100 mg in sodium chloride 0.9 % 100 mL IVPB (ready to mix system) Q24H    morphine injection 2 mg Q4H PRN    morphine injection 4 mg Q4H PRN    mupirocin 2 % ointment BID    naloxone 0.4 mg/mL injection 0.02 mg PRN    ondansetron injection 4 mg Q8H PRN    oxybutynin tablet 5 mg TID    piperacillin-tazobactam 4.5 g in sodium chloride 0.9% 100 mL IVPB (ready to mix system) Q8H    sodium chloride 0.9% flush 3 mL PRN       Objective:     Vital Signs (Most Recent):  Temp: 97 °F (36.1 °C) (05/03/22 0415)  Pulse: 60 (05/03/22 0415)  Resp: 14 (05/03/22 0415)  BP: (!) 103/53 (05/03/22 0415)  SpO2: (!) 91 % (05/03/22 0415)  O2 Device (Oxygen Therapy): nasal cannula (05/02/22 1245)   Vital Signs (24h Range):  Temp:  [97 °F (36.1 °C)-99.9 °F (37.7 °C)] 97 °F (36.1 °C)  Pulse:  [44-84] 60  Resp:  [14-20] 14  SpO2:  [90 %-96 %] 91 %  BP: (103-157)/(53-67) 103/53     Weight: 75.8 kg (167 lb) (04/28/22 1117)  Body mass index is 28.67 kg/m².  Body surface area is 1.85 meters squared.    I/O last 3 completed shifts:  In: 810 [P.O.:810]  Out: -     Physical Exam  Vitals reviewed.   Constitutional:       Appearance: Normal  appearance.   HENT:      Head: Normocephalic and atraumatic.      Mouth/Throat:      Mouth: Mucous membranes are moist.   Eyes:      Conjunctiva/sclera: Conjunctivae normal.      Pupils: Pupils are equal, round, and reactive to light.   Cardiovascular:      Rate and Rhythm: Normal rate and regular rhythm.      Heart sounds: Normal heart sounds.   Pulmonary:      Effort: Pulmonary effort is normal.      Breath sounds: Normal breath sounds.   Abdominal:      General: Bowel sounds are normal.      Palpations: Abdomen is soft.      Tenderness: There is no abdominal tenderness. There is no right CVA tenderness, left CVA tenderness or guarding.   Musculoskeletal:         General: No swelling.      Right lower leg: No edema.      Left lower leg: No edema.   Skin:     Coloration: Skin is not jaundiced.      Findings: No rash.   Neurological:      General: No focal deficit present.   Psychiatric:         Mood and Affect: Mood normal.         Behavior: Behavior normal.       Significant Labs:  CBC:   Recent Labs   Lab 04/29/22  1408   WBC 4.41   RBC 4.22   HGB 12.7   HCT 39.7   *   MCV 94   MCH 30.1   MCHC 32.0     CMP:   Recent Labs   Lab 04/29/22  1408 04/30/22  0526 05/03/22  0145   *   < > 96   CALCIUM 9.2   < > 9.4   ALBUMIN 2.4*  --   --    PROT 5.4*  --   --       < > 135*   K 3.9   < > 3.3*   CO2 28   < > 22*      < > 102   BUN 32*   < > 34*   CREATININE 2.1*   < > 2.0*   ALKPHOS 69  --   --    ALT 30  --   --    AST 50*  --   --    BILITOT 1.7*  --   --     < > = values in this interval not displayed.        Significant Imaging:  Reviewed

## 2022-05-03 NOTE — PROGRESS NOTES
[facility]      Facility:    [facility] [[id number]]  Code Status: DNR       Chief Complaint/Reason for Visit:  Chief Complaint   Patient presents with     H & P     Admit note to TCU-weakness, abdominal pain, recent pneumonia, hypoxia.       HPI:   Nedra is a 84 y.o. female with hx of afib not on AC, HTN, PMR, UZMA, admitted to the hospital on 10/15/19 due to generalized weakness and abdominal pain as noted below.     Primary Discharge Diagnoses:   Summary of Stay: Nedra Carr is a 84 year old female with a PMH significant for a fib, HTN, UZMA, PMR and was recently hospitalized for pneumonia and incidentally noted to have a kidney stone that dropped into the bladder, who was directly admitted from endoscopy on 10/15/2019 with generalized weakness and abdominal pain.      Problem List:   1. RUQ abdominal pain - ongoing since shortly after discharge from hospital on 10/9. Denies nausea, vomiting, diarrhea or worsening pain with eating. She denies dysphagia but nursing reports issues with swallowing at bedside. Speech consulted, notes moderate oropharyngeal dysphagia, diet adjusted. Endorses poor appetite, notes regular BM (although CT scan from hospitalization showed colonic constipation), also showed incidental kidney stone but nothing else acute. Daughter reports hx of duodenal ulcers, hx of cholecystectomy. Denies fevers. UA from 10/14 looked concerning for infection but urine culture is growing mixed urogenital barrera, UA repeated on 10/15, UA again looks concerning for infection but given previous culture results, abx held and urine culture followed, no growth thus far. RUQ US obtained and was unremarkable. No ulcers noted on EGD today, no dilation done of esophagus, biopsies taken. Recently started on Protonix and carafate, continue, switched carafate to liquid here. PO intake improved, tolerating oral intake. Denies abd pain today, notes it can be  Spoke with MD regarding patient blood pressure. Instructed to hold am b/p meds. Will continue to monitor.   intermittent  2. Generalized weakness - multifactorial. Recent hospitalization for pneumonia, pt and daughter state she has gotten progressively weaker since discharge. Poor appetite since discharge, likely deconditioning as well. Pt denies worsening SOB, denies diarrhea, N/V. Labs and CXR from yesterday were fairly unremarkable. Equivocal UA with culture now growing mixed urogenital barrera. UA and culture repeated, not growing bacteria at this time, still pending. SW consulted for TCU placement, pt will discharge to [facility].   3. Hypoxia - pt noted to be mildly hypoxic on room air upon arrival to the floor, no hx of chronic respiratory failure. Recently hospitalized with pneumonia, treated with abx, has completed course. CXR yesterday looks unchanged/stable. O2 was weaned, pt did develop mild hypoxia and increased SOB with ambulation, O2 sats improved quickly with rest and deep breaths. Continue to monitor at TCU, continue IS. Pt states SOB she experiences with ambulation is improved from previous.      Brief Hospital Summary:   Reason for your hospital stay   Generalized weakness and abdominal pain.     Briefly, Nedra Carr was admitted on 10/15/2019 for concerns of generalized weakness and abdominal pain.  Pt was admitted directly to the OBS unit after EGD earlier in the day due to abd pain. Pt was weak and no source of abdominal pain was found on EGD.. UA was abnormal prior to admission, this was repeated, urine culture sent. RUQ US was obtained and negative for acute process. Pt was provided supportive cares. Labs were reviewed and significant results addressed. Speech therapy was consulted and noted pt to have moderate dysphagia and diet adjusted. The patient's abdominal pain improved, she tolerated a oral intake. She was initially placed on oxygen due to mild hypoxia. She was weaned off O2 at rest and maintained normal SpO2. She was ambulated in the hallway and SpO2 did drop into the 80s with  exertion. Her O2 sats would quickly recover with rest and deep breaths. She did note SOB with exertion but states this is improved from previous. On the day of discharge, pt's strength had improved and a safe discharge plan was arranged. Medications were reviewed and adjustments made as necessary.    Overall stabilized and discharged to TCU on 10/17/19 for PT, OT, nursing cares, medical management and monitoring.     Today:  She continues on supplemental oxygen, some residual cough noted. No fever. She is on nectar thick liquids due to dysphagia. No chest pain. No complaints of abdominal pain today, she is on Protonix and carafate. Has anemia, on iron. Last hgb in the hospital on 10/14/19 was 10.0, then on 10/21/19 was 8.3 and today 7.6. Iron will be increased to two times a day. No signs of bleeding. Labs to be rechecked on 10/28/19 and GI updated. Her appetite is poor. Weight is only 65.4 pounds, dietician involved. She denies diarrhea or constipation. No new vision or hearing problems.       Past Medical History:  Past Medical History:   Diagnosis Date     A-fib (H)      Dysphagia 2000    Had duodenal strcture dilated at [facility] in 2000 and by Dr. Hernández in 2001     HTN (hypertension)      Migraine     beta blocker prophylaxis     OA (osteoarthritis)      UZMA (obstructive sleep apnea) 04/09/2015     Pain in joint, shoulder region      Polymyalgia rheumatica (H)      Renal stone            Surgical History:  Past Surgical History:   Procedure Laterality Date     BACK SURGERY       CHOLECYSTECTOMY  11/15/2014     COLONOSCOPY  09/03/2019     CYSTOSCOPY W/ LITHOLAPAXY / EHL  04/08/2015    Procedure: COMBINED CYSTOSCOPY, LITHOLAPAXY; Surgeon: Randy Reno MD; Location: [facility] OR       ESOPHAGOGASTRODUODENOSCOPY  11/21/12, 2/22/13, 1/13/15, 3/5/15, 11/10/15, 10/15/19     EXPLORATORY LAPAROTOMY  11/15/2014     FLEXIBLE BRONCHOSCOPY  11/21/2014     HIP FRACTURE SURGERY      x2     HYSTERECTOMY       LASER HOLMIUM  LITHOTRIPSY BLADDER  04/08/2015       Family History:   Family History   Problem Relation Age of Onset     Diabetes Mother      Diabetes Sister      Heart disease Son        Social History:    Social History     Socioeconomic History     Marital status:      Spouse name: Not on file     Number of children: Not on file     Years of education: Not on file     Highest education level: Not on file   Occupational History     Not on file   Social Needs     Financial resource strain: Not on file     Food insecurity:     Worry: Not on file     Inability: Not on file     Transportation needs:     Medical: Not on file     Non-medical: Not on file   Tobacco Use     Smoking status: Never Smoker     Smokeless tobacco: Never Used   Substance and Sexual Activity     Alcohol use: Not Currently     Drug use: Not on file     Sexual activity: Not on file   Lifestyle     Physical activity:     Days per week: Not on file     Minutes per session: Not on file     Stress: Not on file   Relationships     Social connections:     Talks on phone: Not on file     Gets together: Not on file     Attends Yarsanism service: Not on file     Active member of club or organization: Not on file     Attends meetings of clubs or organizations: Not on file     Relationship status: Not on file     Intimate partner violence:     Fear of current or ex partner: Not on file     Emotionally abused: Not on file     Physically abused: Not on file     Forced sexual activity: Not on file   Other Topics Concern     Not on file   Social History Narrative     Not on file       Medications:  Current Outpatient Medications   Medication Sig     acetaminophen (TYLENOL) 325 MG tablet Take 650 mg by mouth every 6 (six) hours as needed for pain.     albuterol (ACCUNEB) 1.25 mg/3 mL nebulizer solution Take 1 ampule by nebulization every 4 (four) hours as needed for wheezing.            albuterol (ACCUNEB) 1.25 mg/3 mL nebulizer solution Take 1 ampule by nebulization 4  (four) times a day.     alendronate (FOSAMAX) 70 MG tablet Take 70 mg by mouth every 7 days. Take in the morning on an empty stomach with a full glass of water 30 minutes before food (thur)     benzonatate (TESSALON) 100 MG capsule Take 100 mg by mouth 3 (three) times a day as needed for cough.     ferrous gluconate (FERGON) 324 MG tablet Take 324 mg by mouth daily with breakfast.     guaiFENesin (ROBITUSSIN) 100 mg/5 mL syrup Take 100 mg by mouth every 4 (four) hours as needed for cough. And 200mg q6h prn           melatonin 1 mg Tab tablet Take 1 mg by mouth at bedtime.     multivitamin therapeutic tablet Take 1 tablet by mouth daily.     pantoprazole (PROTONIX) 40 MG tablet Take 40 mg by mouth daily.     sucralfate (CARAFATE) 100 mg/mL suspension Take 1 g by mouth 4 (four) times a day.     vitamin A-vitamin C-vit E-min (OCUVITE) Tab tablet Take 1 tablet by mouth daily.       Allergies:  No Known Allergies       Review of Systems:  Pertinent items are noted in HPI.      Physical Exam:   General: Patient is alert, thin, frail, pale elderly female, on oxygen per NC.  Vitals: /70, Temp 98.2, Pulse 84, RR 18, O2 sat 94% on O2.  HEENT: Head is NCAT. Eyes show no injection or icterus. Nares negative. Oropharynx well hydrated.  Neck: Supple. No tenderness or adenopathy. No JVD.  Lungs: Diminished at bases. Upper rhonchi. No wheezes.  Cardiovascular: Regular rate and rhythm, normal S1, S2.  Back: No spinal or CVA tenderness.  Abdomen: Soft, no tenderness on exam. Bowel sounds present. No guarding rebound or rigidity.  : Deferred.  Extremities: No edema is noted.  Musculoskeletal: Degen changes.   Skin: Warm and dry.  Psych: Mood appears pretty good.      Labs:  Outside hospital      Assessment/Plan:  1. Weakness. Overall frailty, debilitation. Attempting therapy.  2. HTN. On metoprolol, helps with rate control for afib. Monitor BPs.   3. Hx Afib. No anticoagulation due to GI issues. Rate control with beta  blocker.  4. Anemia. Hgb has dropped, she is on iron which will be increased. No obvious bleeding, follow closely, update GI. Had EGD with no ulcers. Continue PPI and Carafate.  5. Recent pneumonia. No current abx. Still on oxygen, attempting to wean. Encourage IS.  6. Dysphagia. On nectar thick liquids, working with speech therapy.  7. Code status is DNR.        Total time greater than 45 minutes, greater than 50% counseling and coordination of care, time spent in interview and examination of patient, discussion with nursing staff. CC time includes review with patient of hospital events, EGD and recommendations from GI, monitoring of Hgb and watch for signs of bleeding, increase iron. Attempt to wean form O2, continue GI meds, follow up EGD later. Expected course in TCU.       Electronically signed by: Clarita Deal MD

## 2022-05-03 NOTE — SUBJECTIVE & OBJECTIVE
Interval History: NAEON. Doing well.     Review of Systems   Constitutional:  Negative for chills and fever.   Respiratory:  Negative for shortness of breath.    Cardiovascular:  Negative for chest pain.   Gastrointestinal:  Negative for abdominal pain.   Genitourinary:  Negative for dysuria.   Neurological:  Negative for headaches.   Psychiatric/Behavioral:  Negative for confusion.      Objective:     Vital Signs (Most Recent):  Temp: 97.8 °F (36.6 °C) (05/03/22 1326)  Pulse: 66 (05/03/22 1326)  Resp: 16 (05/03/22 1326)  BP: (!) 112/59 (05/03/22 1326)  SpO2: 95 % (05/03/22 1326)   Vital Signs (24h Range):  Temp:  [97 °F (36.1 °C)-99.9 °F (37.7 °C)] 97.8 °F (36.6 °C)  Pulse:  [44-84] 66  Resp:  [14-20] 16  SpO2:  [91 %-96 %] 95 %  BP: ()/(51-67) 112/59     Weight: 75.8 kg (167 lb)  Body mass index is 28.67 kg/m².    Intake/Output Summary (Last 24 hours) at 5/3/2022 1518  Last data filed at 5/3/2022 0506  Gross per 24 hour   Intake 250 ml   Output --   Net 250 ml        Physical Exam  Vitals and nursing note reviewed.   Constitutional:       General: She is not in acute distress.     Appearance: She is well-developed.   HENT:      Head: Normocephalic and atraumatic.   Eyes:      Conjunctiva/sclera: Conjunctivae normal.   Neck:      Vascular: No JVD.   Cardiovascular:      Rate and Rhythm: Normal rate and regular rhythm.      Heart sounds: Normal heart sounds.   Pulmonary:      Effort: Pulmonary effort is normal.      Breath sounds: Normal breath sounds.   Abdominal:      General: Bowel sounds are normal. There is no distension.      Palpations: Abdomen is soft.   Musculoskeletal:      Cervical back: Neck supple.      Right lower leg: No edema.      Left lower leg: No edema.   Neurological:      Mental Status: She is alert.   Psychiatric:         Behavior: Behavior normal.       Significant Labs: All pertinent labs within the past 24 hours have been reviewed.  CBC:   No results for input(s): WBC, HGB, HCT, PLT  in the last 48 hours.    CMP:   Recent Labs   Lab 05/02/22  0529 05/03/22  0145   * 135*   K 3.5 3.3*    102   CO2 23 22*   GLU 88 96   BUN 32* 34*   CREATININE 1.9* 2.0*   CALCIUM 9.5 9.4   ANIONGAP 8 11   EGFRNONAA 24.9* 23.4*       Urine Culture: No results for input(s): LABURIN in the last 48 hours.    Significant Imaging: I have reviewed all pertinent imaging results/findings within the past 24 hours.

## 2022-05-03 NOTE — PLAN OF CARE
Problem: Adult Inpatient Plan of Care  Goal: Patient-Specific Goal (Individualized)  Outcome: Ongoing, Progressing  Goal: Absence of Hospital-Acquired Illness or Injury  Outcome: Ongoing, Progressing  Goal: Optimal Comfort and Wellbeing  Outcome: Ongoing, Progressing  Goal: Readiness for Transition of Care  Outcome: Ongoing, Progressing     Problem: Infection  Goal: Absence of Infection Signs and Symptoms  Outcome: Ongoing, Progressing     Problem: Fluid and Electrolyte Imbalance (Acute Kidney Injury/Impairment)  Goal: Fluid and Electrolyte Balance  Outcome: Ongoing, Progressing     Problem: Oral Intake Inadequate (Acute Kidney Injury/Impairment)  Goal: Optimal Nutrition Intake  Outcome: Ongoing, Progressing     Problem: Renal Function Impairment (Acute Kidney Injury/Impairment)  Goal: Effective Renal Function  Outcome: Ongoing, Progressing       Meds given per MD order. Telemetry monitoring in place. Safety checks performed. Remains on 3L. Bed remains low, call light in reach.

## 2022-05-03 NOTE — PROGRESS NOTES
[facility] ([address] Medicine  Progress Note    Patient Name: Misa Barajas  MRN: [id number]  Patient Class: IP- Inpatient   Admission Date: 4/28/2022  Length of Stay: 5 days  Attending Physician: Celina Pereira MD  Primary Care Provider: Celia Carlisle MD      Subjective:     Principal Problem:Right ureteral stone      HPI:  Misa Barajas is a 76 y.o. female with medical history of COPD, CRF on O2, Diastolic HF (EF 65%), Parox AFib on Eliquis, HTN, HLD, CKD, Hx of Breast cancer s/p L mastectomy who was admitted to the hospital for an infected kidney stone. Patient reports 5 days of hematuria with chills. No reported fever. Concerned she is having a side effect of her Eliquis. Denies abdominal pain, flank pain, dysuria, diarrhea, constipation.   CT RSS obtained in the ED showed severe right hydroureteronephrosis to the level of a 1.3cm right distal ureteral stone. WBC 7.7. Cr 2.0 (BL 1.2). UA nitrite negative, >100 RBC, 21 WBC, no bacteria. Febrile to 101.5 in the ED. Urology was consulted. She was taken to the OR for class A cystoscopy with right ureteral stent placement.  She was seen in the PACU and has no current pain.       Interval History: NAEON. Doing well.     Review of Systems   Constitutional:  Negative for chills and fever.   Respiratory:  Negative for shortness of breath.    Cardiovascular:  Negative for chest pain.   Gastrointestinal:  Negative for abdominal pain.   Genitourinary:  Negative for dysuria.   Neurological:  Negative for headaches.   Psychiatric/Behavioral:  Negative for confusion.      Objective:     Vital Signs (Most Recent):  Temp: 97.8 °F (36.6 °C) (05/03/22 1326)  Pulse: 66 (05/03/22 1326)  Resp: 16 (05/03/22 1326)  BP: (!) 112/59 (05/03/22 1326)  SpO2: 95 % (05/03/22 1326)   Vital Signs (24h Range):  Temp:  [97 °F (36.1 °C)-99.9 °F (37.7 °C)] 97.8 °F (36.6 °C)  Pulse:  [44-84] 66  Resp:  [14-20] 16  SpO2:  [91 %-96 %] 95 %  BP:  ()/(51-67) 112/59     Weight: 75.8 kg (167 lb)  Body mass index is 28.67 kg/m².    Intake/Output Summary (Last 24 hours) at 5/3/2022 1518  Last data filed at 5/3/2022 0506  Gross per 24 hour   Intake 250 ml   Output --   Net 250 ml        Physical Exam  Vitals and nursing note reviewed.   Constitutional:       General: She is not in acute distress.     Appearance: She is well-developed.   HENT:      Head: Normocephalic and atraumatic.   Eyes:      Conjunctiva/sclera: Conjunctivae normal.   Neck:      Vascular: No JVD.   Cardiovascular:      Rate and Rhythm: Normal rate and regular rhythm.      Heart sounds: Normal heart sounds.   Pulmonary:      Effort: Pulmonary effort is normal.      Breath sounds: Normal breath sounds.   Abdominal:      General: Bowel sounds are normal. There is no distension.      Palpations: Abdomen is soft.   Musculoskeletal:      Cervical back: Neck supple.      Right lower leg: No edema.      Left lower leg: No edema.   Neurological:      Mental Status: She is alert.   Psychiatric:         Behavior: Behavior normal.       Significant Labs: All pertinent labs within the past 24 hours have been reviewed.  CBC:   No results for input(s): WBC, HGB, HCT, PLT in the last 48 hours.    CMP:   Recent Labs   Lab 05/02/22  0529 05/03/22  0145   * 135*   K 3.5 3.3*    102   CO2 23 22*   GLU 88 96   BUN 32* 34*   CREATININE 1.9* 2.0*   CALCIUM 9.5 9.4   ANIONGAP 8 11   EGFRNONAA 24.9* 23.4*       Urine Culture: No results for input(s): LABURIN in the last 48 hours.    Significant Imaging: I have reviewed all pertinent imaging results/findings within the past 24 hours.      Assessment/Plan:      R renal stone  R hydronephrosis and hydroureter  Pyelonephritis   - s/p stent placement. Continue IV abx for pyuria and pyonephrosis seen during procedure. Continue anti pyretics prn. Oxybutynin for spasms.   - urine cultures negative x2.  - Patient will need to be on Zosyn for 7 days per ID  -  outpatient ureteroscopy in a couple weeks for definitive stone management.        Fungemia  Blood cx with CANDIDA GLABRATA. Susc pending.   ID following. 2 D echo requested-- not done. Repeat blood Cx's NGTD.      MARQUEZ on CKD III  Secondary to obstructive stone. mild improvement today since stent placement. Repeat renal u/s with improvement in hydronephrosis.   Nephrology following. Appreciate assistance.   Renally dose meds for Estimated Creatinine Clearance: 23.1 mL/min (A) (based on SCr of 2 mg/dL (H)).         COPD  Chronic respiratory failure secondary to hypoxia  - only uses her O2 prn at home. Will monitor  - Breo and prn inh tx     Chronic diastolic CHF  Stable. Continue home meds. Monitor fluids.      PAF  Stable. Continue BB and Eliquis.      HTN  Stable. BP has been low. Med doses decreased.     VTE Risk Mitigation (From admission, onward)         Ordered     apixaban tablet 2.5 mg  2 times daily         04/29/22 1209     IP VTE HIGH RISK PATIENT  Once         04/28/22 1723     Place sequential compression device  Until discontinued         04/28/22 1723                Discharge Planning   NOHEMI: 5/5/2022     Code Status: Full Code   Is the patient medically ready for discharge?:     Reason for patient still in hospital (select all that apply): Patient trending condition, Treatment and Consult recommendations. POC discussed with her daughter at length. All questions answered.   Discharge Plan A: Home        Celina Pereira MD  Department of Hospital Medicine   [facility] - Intensive Care ([address])

## 2022-05-03 NOTE — ASSESSMENT & PLAN NOTE
MARQUEZ on CKD   Bl sCr around 1.1-1.4; up to 2.1 during this admission  Renal function  Cr stable at 2 today   KUS noted; hydro improving (after R ureteral stent placement); bl nephrolithiasis   Likely MARQUEZ vs  ATN; now recovering   RFP daily   Strict I/Os   Avoid nephrotoxic agents including NSAIDs, IV contrast.   Renally dose medications to GFR  Will need to follow up as  outpatient with urology  for definitive management of her 1.3cm stone along with her Nephrologist for kidney stone evaluation.

## 2022-05-03 NOTE — PROGRESS NOTES
[facility] ([place] [address])  Nephrology  Progress Note    Patient Name: Misa Barajas  MRN: [id number]  Admission Date: 4/28/2022  Hospital Length of Stay: 5 days  Attending Provider: Celina Pereira MD   Primary Care Physician: Celia Carlisle MD  Principal Problem:Right ureteral stone    Subjective:     HPI: Misa Barajas is a 76 y.o. female with medical history of COPD, CRF on O2, Diastolic HF (EF 65%), Parox AFib on Eliquis, HTN, HLD, CKD, Hx of Breast cancer s/p L mastectomy who was admitted to the hospital for an infected kidney stone. Patient reports 5 days of hematuria with chills. No reported fever. Concerned she is having a side effect of her Eliquis. Denies abdominal pain, flank pain, dysuria, diarrhea, constipation. CT of the abdomen obtained in the ED showed severe right hydroureteronephrosis to the level of a 1.3cm right distal ureteral stone.UA nitrite negative, >100 RBC, 21 WBC, no bacteria. Fevers were noted. Urology was consulted and the patient was taken to the OR for cystoscopy with R ureteral stent placement.   Blood cultures grew fungus. Nephrology has been consulted for evaluation of MARQUEZ.       Interval History: scr stable at 2 today     Review of patient's allergies indicates:   Allergen Reactions    Adhesive Rash     Pt states she removed a LATEX bandaid and the skin beneath was swollen and red. No other latex causes a reaction.     Current Facility-Administered Medications   Medication Frequency    acetaminophen tablet 1,000 mg Q8H PRN    apixaban tablet 2.5 mg BID    aspirin EC tablet 81 mg Daily    atorvastatin tablet 20 mg QHS    dextrose 10% bolus 125 mL PRN    dextrose 10% bolus 250 mL PRN    fluticasone furoate-vilanteroL 100-25 mcg/dose diskus inhaler 1 puff Daily    glucagon (human recombinant) injection 1 mg PRN    glucose chewable tablet 16 g PRN    glucose chewable tablet 24 g PRN    losartan split tablet 25 mg Daily    metoprolol succinate  (TOPROL-XL) 24 hr tablet 100 mg Daily    micafungin 100 mg in sodium chloride 0.9 % 100 mL IVPB (ready to mix system) Q24H    morphine injection 2 mg Q4H PRN    morphine injection 4 mg Q4H PRN    mupirocin 2 % ointment BID    naloxone 0.4 mg/mL injection 0.02 mg PRN    ondansetron injection 4 mg Q8H PRN    oxybutynin tablet 5 mg TID    piperacillin-tazobactam 4.5 g in sodium chloride 0.9% 100 mL IVPB (ready to mix system) Q8H    sodium chloride 0.9% flush 3 mL PRN       Objective:     Vital Signs (Most Recent):  Temp: 97 °F (36.1 °C) (05/03/22 0415)  Pulse: 60 (05/03/22 0415)  Resp: 14 (05/03/22 0415)  BP: (!) 103/53 (05/03/22 0415)  SpO2: (!) 91 % (05/03/22 0415)  O2 Device (Oxygen Therapy): nasal cannula (05/02/22 1245)   Vital Signs (24h Range):  Temp:  [97 °F (36.1 °C)-99.9 °F (37.7 °C)] 97 °F (36.1 °C)  Pulse:  [44-84] 60  Resp:  [14-20] 14  SpO2:  [90 %-96 %] 91 %  BP: (103-157)/(53-67) 103/53     Weight: 75.8 kg (167 lb) (04/28/22 1117)  Body mass index is 28.67 kg/m².  Body surface area is 1.85 meters squared.    I/O last 3 completed shifts:  In: 810 [P.O.:810]  Out: -     Physical Exam  Vitals reviewed.   Constitutional:       Appearance: Normal appearance.   HENT:      Head: Normocephalic and atraumatic.      Mouth/Throat:      Mouth: Mucous membranes are moist.   Eyes:      Conjunctiva/sclera: Conjunctivae normal.      Pupils: Pupils are equal, round, and reactive to light.   Cardiovascular:      Rate and Rhythm: Normal rate and regular rhythm.      Heart sounds: Normal heart sounds.   Pulmonary:      Effort: Pulmonary effort is normal.      Breath sounds: Normal breath sounds.   Abdominal:      General: Bowel sounds are normal.      Palpations: Abdomen is soft.      Tenderness: There is no abdominal tenderness. There is no right CVA tenderness, left CVA tenderness or guarding.   Musculoskeletal:         General: No swelling.      Right lower leg: No edema.      Left lower leg: No edema.    Skin:     Coloration: Skin is not jaundiced.      Findings: No rash.   Neurological:      General: No focal deficit present.   Psychiatric:         Mood and Affect: Mood normal.         Behavior: Behavior normal.       Significant Labs:  CBC:   Recent Labs   Lab 04/29/22  1408   WBC 4.41   RBC 4.22   HGB 12.7   HCT 39.7   *   MCV 94   MCH 30.1   MCHC 32.0     CMP:   Recent Labs   Lab 04/29/22  1408 04/30/22  0526 05/03/22  0145   *   < > 96   CALCIUM 9.2   < > 9.4   ALBUMIN 2.4*  --   --    PROT 5.4*  --   --       < > 135*   K 3.9   < > 3.3*   CO2 28   < > 22*      < > 102   BUN 32*   < > 34*   CREATININE 2.1*   < > 2.0*   ALKPHOS 69  --   --    ALT 30  --   --    AST 50*  --   --    BILITOT 1.7*  --   --     < > = values in this interval not displayed.        Significant Imaging:  Reviewed     Assessment/Plan:     * Right ureteral stone  S/p cystoscopy and stent placement by      MARQUEZ (acute kidney injury)  MARQUEZ on CKD   Bl sCr around 1.1-1.4; up to 2.1 during this admission  Renal function  Cr stable at 2 today   KUS noted; hydro improving (after R ureteral stent placement); bl nephrolithiasis   Likely MARQUEZ vs  ATN; now recovering   RFP daily   Strict I/Os   Avoid nephrotoxic agents including NSAIDs, IV contrast.   Renally dose medications to GFR  Will need to follow up as  outpatient with urology  for definitive management of her 1.3cm stone along with her Nephrologist for kidney stone evaluation.     Fungemia  Management per primary           Carmina Hawk MD  Nephrology  [facility] - Intensive Care ([facility]

## 2022-05-03 NOTE — PLAN OF CARE
Problem: Adult Inpatient Plan of Care  Goal: Patient-Specific Goal (Individualized)  Outcome: Ongoing, Progressing  Goal: Absence of Hospital-Acquired Illness or Injury  Outcome: Ongoing, Progressing  Goal: Optimal Comfort and Wellbeing  Outcome: Ongoing, Progressing  Goal: Readiness for Transition of Care  Outcome: Ongoing, Progressing     Patient remained free of falls today. Patient knows to call when assistance needed. No distress noted. Call bell in reach. Bed in lowest position. Safety maintained. Will continue to monitor.

## 2022-05-03 NOTE — ASSESSMENT & PLAN NOTE
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 Febrile on admit with pyuria on UA, but negative urine culture. CT revealed severe rt hydronephrosis with perinephric stranding and inflammatory change surrounding the ureter with a 7mm stone at the distal ureter. Underwent cystoscopy with rt ureteral stent placement 4/29 that revealed pyonephrosis. 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 Suspect due to urinary source in the setting of obstructing stone, however UCx remained neg.     Recommendations:  --continue micafungin for C glabrata pending susceptibilities  --If susceptibility profile reveals that C glabrata is sensitive to fluconazole, recommend switching to PO fluconazole 400 mg daily to complete 14 day fungemia course (stop date 5/14/22)  --TTE to r/o IE  --If develops vision changes will need ophtha eval  --Continue pip-tazo to complete a 10 day course for pyelonephritis; can switch to PO levofloxacin at discharge; stop date 5/11/22

## 2022-05-03 NOTE — SUBJECTIVE & OBJECTIVE
Interval History: Patient seen at bedside. Appears fatigued today and unable to provide much history. However she denies any new symptoms. Just wants to get some sleep.     Review of Systems   Constitutional:  Positive for fatigue. Negative for chills, fever and unexpected weight change.   HENT:  Negative for congestion, postnasal drip and trouble swallowing.    Respiratory:  Negative for cough, chest tightness and shortness of breath.    Cardiovascular:  Negative for chest pain, palpitations and leg swelling.   Gastrointestinal:  Negative for abdominal pain, blood in stool, constipation, diarrhea, nausea and vomiting.   Genitourinary:  Negative for difficulty urinating, dysuria and hematuria.   Musculoskeletal:  Negative for arthralgias and back pain.   Neurological:  Negative for dizziness and light-headedness.   Psychiatric/Behavioral:  Negative for confusion.    Objective:     Vital Signs (Most Recent):  Temp: 97.8 °F (36.6 °C) (05/03/22 1326)  Pulse: 66 (05/03/22 1326)  Resp: 16 (05/03/22 1326)  BP: (!) 112/59 (05/03/22 1326)  SpO2: 95 % (05/03/22 1326)   Vital Signs (24h Range):  Temp:  [97 °F (36.1 °C)-99.9 °F (37.7 °C)] 97.8 °F (36.6 °C)  Pulse:  [44-84] 66  Resp:  [14-20] 16  SpO2:  [91 %-96 %] 95 %  BP: ()/(51-67) 112/59     Weight: 75.8 kg (167 lb)  Body mass index is 28.67 kg/m².    Estimated Creatinine Clearance: 23.1 mL/min (A) (based on SCr of 2 mg/dL (H)).    Physical Exam  Constitutional:       General: She is not in acute distress.     Appearance: Normal appearance. She is not ill-appearing.   Cardiovascular:      Rate and Rhythm: Normal rate and regular rhythm.      Pulses: Normal pulses.      Heart sounds: Normal heart sounds. No murmur heard.    No friction rub. No gallop.   Pulmonary:      Effort: Pulmonary effort is normal. No respiratory distress.      Breath sounds: Normal breath sounds.   Abdominal:      General: Abdomen is flat. Bowel sounds are normal. There is no distension.       Palpations: Abdomen is soft.      Tenderness: There is no abdominal tenderness.   Skin:     General: Skin is warm and dry.   Neurological:      Mental Status: She is alert.       Significant Labs: All pertinent labs within the past 24 hours have been reviewed.    Significant Imaging: I have reviewed all pertinent imaging results/findings within the past 24 hours.

## 2022-05-04 PROBLEM — J96.11 CHRONIC RESPIRATORY FAILURE WITH HYPOXIA: Status: ACTIVE | Noted: 2021-06-26

## 2022-05-04 PROBLEM — J90 PLEURAL EFFUSION: Status: RESOLVED | Noted: 2021-06-25 | Resolved: 2022-05-04

## 2022-05-04 PROBLEM — J98.11 ATELECTASIS: Status: RESOLVED | Noted: 2021-06-28 | Resolved: 2022-05-04

## 2022-05-04 PROBLEM — I82.813: Status: RESOLVED | Noted: 2022-01-25 | Resolved: 2022-05-04

## 2022-05-04 PROBLEM — Z79.01 CHRONIC ANTICOAGULATION: Status: ACTIVE | Noted: 2022-05-04

## 2022-05-04 PROBLEM — N13.30 HYDRONEPHROSIS: Status: RESOLVED | Noted: 2021-06-24 | Resolved: 2022-05-04

## 2022-05-04 PROBLEM — N13.30 HYDROURETERONEPHROSIS: Status: ACTIVE | Noted: 2022-05-04

## 2022-05-04 PROBLEM — N10 ACUTE PYELONEPHRITIS: Status: ACTIVE | Noted: 2022-05-04

## 2022-05-04 PROBLEM — N28.89 RIGHT RENAL MASS: Status: RESOLVED | Noted: 2021-03-11 | Resolved: 2022-05-04

## 2022-05-04 PROBLEM — N18.31 ACUTE RENAL FAILURE SUPERIMPOSED ON STAGE 3A CHRONIC KIDNEY DISEASE: Status: ACTIVE | Noted: 2022-05-02

## 2022-05-04 PROBLEM — I50.32 CHRONIC DIASTOLIC HEART FAILURE: Status: ACTIVE | Noted: 2021-12-05

## 2022-05-04 PROBLEM — B37.7 CANDIDEMIA: Status: ACTIVE | Noted: 2022-05-01

## 2022-05-04 PROBLEM — R09.89 PULMONARY VASCULAR CONGESTION: Status: RESOLVED | Noted: 2021-06-23 | Resolved: 2022-05-04

## 2022-05-04 LAB
ASCENDING AORTA: 2.9 CM
AV INDEX (PROSTH): 0.69
AV MEAN GRADIENT: 5 MMHG
AV PEAK GRADIENT: 8 MMHG
AV VALVE AREA: 2.13 CM2
AV VELOCITY RATIO: 0.71
BACTERIA BLD CULT: ABNORMAL
BSA FOR ECHO PROCEDURE: 1.85 M2
CV ECHO LV RWT: 0.42 CM
DOP CALC AO PEAK VEL: 1.43 M/S
DOP CALC AO VTI: 31.82 CM
DOP CALC LVOT AREA: 3.1 CM2
DOP CALC LVOT DIAMETER: 1.98 CM
DOP CALC LVOT PEAK VEL: 1.02 M/S
DOP CALC LVOT STROKE VOLUME: 67.77 CM3
DOP CALCLVOT PEAK VEL VTI: 22.02 CM
E WAVE DECELERATION TIME: 188.26 MSEC
E/A RATIO: 1.05
E/E' RATIO: 26.22 M/S
ECHO LV POSTERIOR WALL: 0.93 CM (ref 0.6–1.1)
EJECTION FRACTION: 60 %
FRACTIONAL SHORTENING: 27 % (ref 28–44)
INTERVENTRICULAR SEPTUM: 1.18 CM (ref 0.6–1.1)
LA MAJOR: 5.12 CM
LA MINOR: 5.28 CM
LA WIDTH: 3.84 CM
LEFT ATRIUM SIZE: 3.53 CM
LEFT ATRIUM VOLUME INDEX: 33.1 ML/M2
LEFT ATRIUM VOLUME: 59.9 CM3
LEFT INTERNAL DIMENSION IN SYSTOLE: 3.26 CM (ref 2.1–4)
LEFT VENTRICLE DIASTOLIC VOLUME INDEX: 49.49 ML/M2
LEFT VENTRICLE DIASTOLIC VOLUME: 89.58 ML
LEFT VENTRICLE MASS INDEX: 89 G/M2
LEFT VENTRICLE SYSTOLIC VOLUME INDEX: 23.6 ML/M2
LEFT VENTRICLE SYSTOLIC VOLUME: 42.79 ML
LEFT VENTRICULAR INTERNAL DIMENSION IN DIASTOLE: 4.44 CM (ref 3.5–6)
LEFT VENTRICULAR MASS: 161.58 G
LV LATERAL E/E' RATIO: 23.6 M/S
LV SEPTAL E/E' RATIO: 29.5 M/S
MV PEAK A VEL: 1.12 M/S
MV PEAK E VEL: 1.18 M/S
MV STENOSIS PRESSURE HALF TIME: 54.6 MS
MV VALVE AREA P 1/2 METHOD: 4.03 CM2
PISA TR MAX VEL: 2.38 M/S
RA MAJOR: 4.27 CM
RA PRESSURE: 8 MMHG
RA WIDTH: 2.9 CM
RIGHT VENTRICULAR END-DIASTOLIC DIMENSION: 3.41 CM
SINUS: 3.32 CM
STJ: 2.97 CM
TDI LATERAL: 0.05 M/S
TDI SEPTAL: 0.04 M/S
TDI: 0.05 M/S
TR MAX PG: 23 MMHG
TRICUSPID ANNULAR PLANE SYSTOLIC EXCURSION: 2.24 CM
TV REST PULMONARY ARTERY PRESSURE: 31 MMHG

## 2022-05-04 PROCEDURE — 25000003 PHARM REV CODE 250: Performed by: INTERNAL MEDICINE

## 2022-05-04 PROCEDURE — 25000003 PHARM REV CODE 250: Performed by: FAMILY MEDICINE

## 2022-05-04 PROCEDURE — 99232 PR SUBSEQUENT HOSPITAL CARE,LEVL II: ICD-10-PCS | Mod: ,,, | Performed by: FAMILY MEDICINE

## 2022-05-04 PROCEDURE — 94761 N-INVAS EAR/PLS OXIMETRY MLT: CPT

## 2022-05-04 PROCEDURE — 27000221 HC OXYGEN, UP TO 24 HOURS

## 2022-05-04 PROCEDURE — 63600175 PHARM REV CODE 636 W HCPCS: Performed by: STUDENT IN AN ORGANIZED HEALTH CARE EDUCATION/TRAINING PROGRAM

## 2022-05-04 PROCEDURE — 63600175 PHARM REV CODE 636 W HCPCS: Mod: JG | Performed by: INTERNAL MEDICINE

## 2022-05-04 PROCEDURE — 99232 SBSQ HOSP IP/OBS MODERATE 35: CPT | Mod: ,,, | Performed by: FAMILY MEDICINE

## 2022-05-04 PROCEDURE — 12000002 HC ACUTE/MED SURGE SEMI-PRIVATE ROOM

## 2022-05-04 PROCEDURE — 99900035 HC TECH TIME PER 15 MIN (STAT)

## 2022-05-04 PROCEDURE — 94640 AIRWAY INHALATION TREATMENT: CPT

## 2022-05-04 PROCEDURE — 25000003 PHARM REV CODE 250: Performed by: STUDENT IN AN ORGANIZED HEALTH CARE EDUCATION/TRAINING PROGRAM

## 2022-05-04 RX ORDER — POTASSIUM CHLORIDE 20 MEQ/1
40 TABLET, EXTENDED RELEASE ORAL ONCE
Status: COMPLETED | OUTPATIENT
Start: 2022-05-04 | End: 2022-05-04

## 2022-05-04 RX ADMIN — OXYBUTYNIN CHLORIDE 5 MG: 5 TABLET ORAL at 03:05

## 2022-05-04 RX ADMIN — OXYBUTYNIN CHLORIDE 5 MG: 5 TABLET ORAL at 10:05

## 2022-05-04 RX ADMIN — PIPERACILLIN SODIUM AND TAZOBACTAM SODIUM 4.5 G: 4; .5 INJECTION, POWDER, LYOPHILIZED, FOR SOLUTION INTRAVENOUS at 10:05

## 2022-05-04 RX ADMIN — APIXABAN 2.5 MG: 2.5 TABLET, FILM COATED ORAL at 10:05

## 2022-05-04 RX ADMIN — ATORVASTATIN CALCIUM 20 MG: 20 TABLET, FILM COATED ORAL at 10:05

## 2022-05-04 RX ADMIN — MICAFUNGIN SODIUM 100 MG: 100 INJECTION, POWDER, LYOPHILIZED, FOR SOLUTION INTRAVENOUS at 11:05

## 2022-05-04 RX ADMIN — APIXABAN 2.5 MG: 2.5 TABLET, FILM COATED ORAL at 09:05

## 2022-05-04 RX ADMIN — POTASSIUM CHLORIDE 40 MEQ: 1500 TABLET, EXTENDED RELEASE ORAL at 10:05

## 2022-05-04 RX ADMIN — LOSARTAN POTASSIUM 12.5 MG: 25 TABLET, FILM COATED ORAL at 09:05

## 2022-05-04 RX ADMIN — OXYBUTYNIN CHLORIDE 5 MG: 5 TABLET ORAL at 09:05

## 2022-05-04 RX ADMIN — PIPERACILLIN SODIUM AND TAZOBACTAM SODIUM 4.5 G: 4; .5 INJECTION, POWDER, LYOPHILIZED, FOR SOLUTION INTRAVENOUS at 04:05

## 2022-05-04 RX ADMIN — PIPERACILLIN SODIUM AND TAZOBACTAM SODIUM 4.5 G: 4; .5 INJECTION, POWDER, LYOPHILIZED, FOR SOLUTION INTRAVENOUS at 01:05

## 2022-05-04 RX ADMIN — ASPIRIN 81 MG: 81 TABLET, COATED ORAL at 09:05

## 2022-05-04 RX ADMIN — METOPROLOL SUCCINATE 25 MG: 25 TABLET, EXTENDED RELEASE ORAL at 09:05

## 2022-05-04 RX ADMIN — FLUTICASONE FUROATE AND VILANTEROL TRIFENATATE 1 PUFF: 100; 25 POWDER RESPIRATORY (INHALATION) at 09:05

## 2022-05-04 NOTE — NURSING
Nurse reassessed previous marked area during the start of shift due to increased swelling- no pain or warmth to touch. PIV discontinued with catheter tip intake. Pressure applied and held  x4 minutes. Secured as pressure dressing. Pt tolerated well. On call provider for Hospital Med team K contacted via secured messaging. Awaiting response or callback

## 2022-05-04 NOTE — HOSPITAL COURSE
She was taken straight to the OR with Urology for stent placement for right ureteral stone causing hydronephrosis and pyelonephritis of right kidney. There were no complications. Urine culture was negative x 2. Patient started on empiric IV Zosyn to treat pyelonephritis. Blood culture grew yeast and prelim cultures with Candida Glabrata and sensitivities pending. ID consulted and patient was placed on IV Micafungin 100 mg daily to treat. ID was consulted. Patient also had MARQUEZ on admit and nephrology consulted and felt related to kidney infection and hydronephrosis and stated should improve with antibiotics and ureteral stent placement. Repeat renal U/S after stent placed by Urology with improvement in hydronephrosis. Echo done to make sure no endovascular involvement with candidemia and echo on 5/4 showed no endocarditis. ID recommending on discharge okay to switch to Levaquin 750 mg po daily to complete treatment course of pyelonephritis with end date 5/11 but continue Zosyn while in hospital. Patient to be discharged as per ID recs on Micafungin 100 mg IV daily for Candida Glabrata candidemia with end date 5/14/2022. PICC line placed with Nephrology approval on 5/5. MARQUEZ continues to improve and resolve on 5/5 and on 5/6 and down to Cr 1.4 on day of discharge. Patient discharged home with Micafungin 100 mg IV daily infusion to treat Candida Glabrata fungemia until 5/14/2022 as per ID recs. Patient switched to Levaquin 750 mg po daily from IV Zosyn on discharge to treat pyelonephritis until 5/11/2022 as per ID recs. Patient will need follow-up in Infectious Disease clinic in 2 weeks after discharge and urology clinic within next 1 month for definitve stone treatment. Patient to get weekly labs while on Micafungin.

## 2022-05-04 NOTE — NURSING
Call received from Obed in laboratory informing the venipuncture for the 0500 BMP was hemolyzed. Will reorder.

## 2022-05-04 NOTE — SUBJECTIVE & OBJECTIVE
Interval History: NAEON. Doing well.     Review of Systems   Constitutional:  Negative for chills and fever.   Respiratory:  Negative for shortness of breath.    Cardiovascular:  Negative for chest pain.   Gastrointestinal:  Negative for abdominal pain.   Genitourinary:  Negative for dysuria.   Neurological:  Negative for headaches.   Psychiatric/Behavioral:  Negative for confusion.      Objective:     Vital Signs (Most Recent):  Temp: 98.1 °F (36.7 °C) (05/04/22 1142)  Pulse: 67 (05/04/22 1201)  Resp: 19 (05/04/22 1142)  BP: (!) 125/58 (05/04/22 1142)  SpO2: (!) 91 % (05/04/22 1142)   Vital Signs (24h Range):  Temp:  [97.4 °F (36.3 °C)-98.1 °F (36.7 °C)] 98.1 °F (36.7 °C)  Pulse:  [60-93] 67  Resp:  [18-19] 19  SpO2:  [90 %-97 %] 91 %  BP: (105-132)/(32-60) 125/58     Weight: 75.8 kg (167 lb)  Body mass index is 28.67 kg/m².  No intake or output data in the 24 hours ending 05/04/22 1353     Physical Exam  Vitals and nursing note reviewed.   Constitutional:       General: She is not in acute distress.     Appearance: She is well-developed.   HENT:      Head: Normocephalic and atraumatic.   Eyes:      Conjunctiva/sclera: Conjunctivae normal.   Neck:      Vascular: No JVD.   Cardiovascular:      Rate and Rhythm: Normal rate and regular rhythm.      Heart sounds: Normal heart sounds.   Pulmonary:      Effort: Pulmonary effort is normal.      Breath sounds: Normal breath sounds.   Abdominal:      General: Bowel sounds are normal. There is no distension.      Palpations: Abdomen is soft.   Musculoskeletal:      Cervical back: Neck supple.      Right lower leg: No edema.      Left lower leg: No edema.   Neurological:      Mental Status: She is alert.   Psychiatric:         Behavior: Behavior normal.       Significant Labs: All pertinent labs within the past 24 hours have been reviewed.  CBC:   No results for input(s): WBC, HGB, HCT, PLT in the last 48 hours.    CMP:   Recent Labs   Lab 05/03/22  0145   *   K 3.3*       CO2 22*   GLU 96   BUN 34*   CREATININE 2.0*   CALCIUM 9.4   ANIONGAP 11   EGFRNONAA 23.4*         Significant Imaging: I have reviewed all pertinent imaging results/findings within the past 24 hours.

## 2022-05-04 NOTE — RESPIRATORY THERAPY
RAPID RESPONSE RESPIRATORY CHART CHECK       Chart check completed, instructed to call [phone] for further concerns or assistance.

## 2022-05-04 NOTE — PROGRESS NOTES
[facility] ([address] Medicine  Progress Note    Patient Name: Misa Barajas  MRN: [id number]  Patient Class: IP- Inpatient   Admission Date: 4/28/2022  Length of Stay: 6 days  Attending Physician: Celina Pereira MD  Primary Care Provider: Celia Carlisle MD      Subjective:     Principal Problem:Right ureteral stone      HPI:  Misa Barajas is a 76 y.o. female with medical history of COPD, CRF on O2, Diastolic HF (EF 65%), Parox AFib on Eliquis, HTN, HLD, CKD, Hx of Breast cancer s/p L mastectomy who was admitted to the hospital for an infected kidney stone. Patient reports 5 days of hematuria with chills. No reported fever. Concerned she is having a side effect of her Eliquis. Denies abdominal pain, flank pain, dysuria, diarrhea, constipation.   CT RSS obtained in the ED showed severe right hydroureteronephrosis to the level of a 1.3cm right distal ureteral stone. WBC 7.7. Cr 2.0 (BL 1.2). UA nitrite negative, >100 RBC, 21 WBC, no bacteria. Febrile to 101.5 in the ED. Urology was consulted. She was taken to the OR for class A cystoscopy with right ureteral stent placement.  She was seen in the PACU and has no current pain.       Overview/Hospital Course:  She was taken straight to the OR with urology for stent placement. There were no complications. Urine culture was negative x2. Blood culture grew yeast. ID was consulted. She was started on Micafungin. Her Cr remained elevated. Repeat renal u/s with improvement in hydronephrosis. Nephrology was consulted. She had intermittent fevers which have resolved with continued IV abx therapy.     Currently awaiting echo results and yeast susceptibility results for dc planning.       Interval History: NAEON. Doing well.     Review of Systems   Constitutional:  Negative for chills and fever.   Respiratory:  Negative for shortness of breath.    Cardiovascular:  Negative for chest pain.   Gastrointestinal:  Negative for abdominal pain.    Genitourinary:  Negative for dysuria.   Neurological:  Negative for headaches.   Psychiatric/Behavioral:  Negative for confusion.      Objective:     Vital Signs (Most Recent):  Temp: 98.1 °F (36.7 °C) (05/04/22 1142)  Pulse: 67 (05/04/22 1201)  Resp: 19 (05/04/22 1142)  BP: (!) 125/58 (05/04/22 1142)  SpO2: (!) 91 % (05/04/22 1142)   Vital Signs (24h Range):  Temp:  [97.4 °F (36.3 °C)-98.1 °F (36.7 °C)] 98.1 °F (36.7 °C)  Pulse:  [60-93] 67  Resp:  [18-19] 19  SpO2:  [90 %-97 %] 91 %  BP: (105-132)/(32-60) 125/58     Weight: 75.8 kg (167 lb)  Body mass index is 28.67 kg/m².  No intake or output data in the 24 hours ending 05/04/22 1353     Physical Exam  Vitals and nursing note reviewed.   Constitutional:       General: She is not in acute distress.     Appearance: She is well-developed.   HENT:      Head: Normocephalic and atraumatic.   Eyes:      Conjunctiva/sclera: Conjunctivae normal.   Neck:      Vascular: No JVD.   Cardiovascular:      Rate and Rhythm: Normal rate and regular rhythm.      Heart sounds: Normal heart sounds.   Pulmonary:      Effort: Pulmonary effort is normal.      Breath sounds: Normal breath sounds.   Abdominal:      General: Bowel sounds are normal. There is no distension.      Palpations: Abdomen is soft.   Musculoskeletal:      Cervical back: Neck supple.      Right lower leg: No edema.      Left lower leg: No edema.   Neurological:      Mental Status: She is alert.   Psychiatric:         Behavior: Behavior normal.       Significant Labs: All pertinent labs within the past 24 hours have been reviewed.  CBC:   No results for input(s): WBC, HGB, HCT, PLT in the last 48 hours.    CMP:   Recent Labs   Lab 05/03/22  0145   *   K 3.3*      CO2 22*   GLU 96   BUN 34*   CREATININE 2.0*   CALCIUM 9.4   ANIONGAP 11   EGFRNONAA 23.4*         Significant Imaging: I have reviewed all pertinent imaging results/findings within the past 24 hours.      Assessment/Plan:      R renal stone  R  hydronephrosis and hydroureter  Pyelonephritis   - s/p stent placement. Continue IV abx for pyuria and pyonephrosis seen during procedure. Continue anti pyretics prn. Oxybutynin for spasms.   - urine cultures negative x2.  - outpatient ureteroscopy in a couple weeks for definitive stone management.  Per ID:  Continue pip-tazo to complete a 10 day course for pyelonephritis; can switch to PO levofloxacin 750 mg daily at discharge; stop date 5/11/22     Fungemia  Blood cx with CANDIDA GLABRATA. Susc pending.   ID following. 2 D echo requested-- not done. Repeat blood Cx's NGTD.   --If susceptibility profile reveals that C glabrata is sensitive to fluconazole, recommend switching to PO fluconazole 400 mg daily to complete 14 day fungemia course from date of clearance (stop date 5/14/22)  --If vegetation is seen on TTE, please call us (ID) back         MARQUEZ on CKD III  Secondary to obstructive stone. mild improvement today since stent placement. Repeat renal u/s with improvement in hydronephrosis.   Nephrology following-- continue to monitor. Appreciate assistance.   Renally dose meds. BMP today pending.         COPD  Chronic respiratory failure secondary to hypoxia  - only uses her O2 prn at home. Will monitor  - Breo and prn inh tx     Chronic diastolic CHF  Stable. Continue home meds. Monitor fluids.      PAF  Stable. Continue BB and Eliquis.      HTN  Stable. BP has been low (med doses decreased 5/3).     VTE Risk Mitigation (From admission, onward)         Ordered     apixaban tablet 2.5 mg  2 times daily         04/29/22 1209     IP VTE HIGH RISK PATIENT  Once         04/28/22 1723     Place sequential compression device  Until discontinued         04/28/22 1723                Discharge Planning   NOHEMI: 5/5/2022     Code Status: Full Code   Is the patient medically ready for discharge?:     Reason for patient still in hospital (select all that apply): Patient trending condition and Laboratory test  Discharge Plan A:  Home        Celina Pereira MD  Department of Hospital Medicine   [facility] - Intensive Care ([place])

## 2022-05-05 LAB
ANION GAP SERPL CALC-SCNC: 6 MMOL/L (ref 8–16)
BASOPHILS # BLD AUTO: 0.06 K/UL (ref 0–0.2)
BASOPHILS NFR BLD: 0.9 % (ref 0–1.9)
BUN SERPL-MCNC: 34 MG/DL (ref 8–23)
CALCIUM SERPL-MCNC: 9.4 MG/DL (ref 8.7–10.5)
CHLORIDE SERPL-SCNC: 108 MMOL/L (ref 95–110)
CO2 SERPL-SCNC: 26 MMOL/L (ref 23–29)
CREAT SERPL-MCNC: 1.8 MG/DL (ref 0.5–1.4)
DIFFERENTIAL METHOD: ABNORMAL
EOSINOPHIL # BLD AUTO: 0.3 K/UL (ref 0–0.5)
EOSINOPHIL NFR BLD: 4 % (ref 0–8)
ERYTHROCYTE [DISTWIDTH] IN BLOOD BY AUTOMATED COUNT: 13.4 % (ref 11.5–14.5)
EST. GFR  (AFRICAN AMERICAN): 30.6 ML/MIN/1.73 M^2
EST. GFR  (NON AFRICAN AMERICAN): 26.6 ML/MIN/1.73 M^2
GLUCOSE SERPL-MCNC: 67 MG/DL (ref 70–110)
HCT VFR BLD AUTO: 33.9 % (ref 37–48.5)
HGB BLD-MCNC: 11.2 G/DL (ref 12–16)
IMM GRANULOCYTES # BLD AUTO: 0.03 K/UL (ref 0–0.04)
IMM GRANULOCYTES NFR BLD AUTO: 0.5 % (ref 0–0.5)
LYMPHOCYTES # BLD AUTO: 1.8 K/UL (ref 1–4.8)
LYMPHOCYTES NFR BLD: 28 % (ref 18–48)
MCH RBC QN AUTO: 29.6 PG (ref 27–31)
MCHC RBC AUTO-ENTMCNC: 33 G/DL (ref 32–36)
MCV RBC AUTO: 89 FL (ref 82–98)
MONOCYTES # BLD AUTO: 0.8 K/UL (ref 0.3–1)
MONOCYTES NFR BLD: 11.6 % (ref 4–15)
NEUTROPHILS # BLD AUTO: 3.6 K/UL (ref 1.8–7.7)
NEUTROPHILS NFR BLD: 55 % (ref 38–73)
NRBC BLD-RTO: 0 /100 WBC
PLATELET # BLD AUTO: 225 K/UL (ref 150–450)
PLATELET BLD QL SMEAR: ABNORMAL
PMV BLD AUTO: 10 FL (ref 9.2–12.9)
POTASSIUM SERPL-SCNC: 4.1 MMOL/L (ref 3.5–5.1)
RBC # BLD AUTO: 3.79 M/UL (ref 4–5.4)
SODIUM SERPL-SCNC: 140 MMOL/L (ref 136–145)
WBC # BLD AUTO: 6.49 K/UL (ref 3.9–12.7)

## 2022-05-05 PROCEDURE — C1751 CATH, INF, PER/CENT/MIDLINE: HCPCS

## 2022-05-05 PROCEDURE — 99900035 HC TECH TIME PER 15 MIN (STAT)

## 2022-05-05 PROCEDURE — 36573 INSJ PICC RS&I 5 YR+: CPT

## 2022-05-05 PROCEDURE — 25000003 PHARM REV CODE 250: Performed by: FAMILY MEDICINE

## 2022-05-05 PROCEDURE — 99232 PR SUBSEQUENT HOSPITAL CARE,LEVL II: ICD-10-PCS | Mod: ,,, | Performed by: INTERNAL MEDICINE

## 2022-05-05 PROCEDURE — 36410 VNPNXR 3YR/> PHY/QHP DX/THER: CPT

## 2022-05-05 PROCEDURE — 85025 COMPLETE CBC W/AUTO DIFF WBC: CPT | Performed by: INTERNAL MEDICINE

## 2022-05-05 PROCEDURE — 99232 SBSQ HOSP IP/OBS MODERATE 35: CPT | Mod: ,,, | Performed by: INTERNAL MEDICINE

## 2022-05-05 PROCEDURE — 25000003 PHARM REV CODE 250: Performed by: INTERNAL MEDICINE

## 2022-05-05 PROCEDURE — 12000002 HC ACUTE/MED SURGE SEMI-PRIVATE ROOM

## 2022-05-05 PROCEDURE — 76937 US GUIDE VASCULAR ACCESS: CPT

## 2022-05-05 PROCEDURE — 94761 N-INVAS EAR/PLS OXIMETRY MLT: CPT

## 2022-05-05 PROCEDURE — 63600175 PHARM REV CODE 636 W HCPCS: Mod: JG | Performed by: INTERNAL MEDICINE

## 2022-05-05 PROCEDURE — A4216 STERILE WATER/SALINE, 10 ML: HCPCS | Performed by: INTERNAL MEDICINE

## 2022-05-05 PROCEDURE — 27000221 HC OXYGEN, UP TO 24 HOURS

## 2022-05-05 PROCEDURE — 36415 COLL VENOUS BLD VENIPUNCTURE: CPT | Performed by: INTERNAL MEDICINE

## 2022-05-05 PROCEDURE — 63600175 PHARM REV CODE 636 W HCPCS: Performed by: STUDENT IN AN ORGANIZED HEALTH CARE EDUCATION/TRAINING PROGRAM

## 2022-05-05 PROCEDURE — 25000003 PHARM REV CODE 250: Performed by: STUDENT IN AN ORGANIZED HEALTH CARE EDUCATION/TRAINING PROGRAM

## 2022-05-05 PROCEDURE — 80048 BASIC METABOLIC PNL TOTAL CA: CPT | Performed by: INTERNAL MEDICINE

## 2022-05-05 RX ORDER — SODIUM CHLORIDE 0.9 % (FLUSH) 0.9 %
10 SYRINGE (ML) INJECTION EVERY 6 HOURS
Status: DISCONTINUED | OUTPATIENT
Start: 2022-05-05 | End: 2022-05-06 | Stop reason: HOSPADM

## 2022-05-05 RX ORDER — LEVOFLOXACIN 750 MG/1
750 TABLET ORAL DAILY
Qty: 6 TABLET | Refills: 0 | Status: SHIPPED | OUTPATIENT
Start: 2022-05-05 | End: 2022-05-12

## 2022-05-05 RX ORDER — SODIUM CHLORIDE 0.9 % (FLUSH) 0.9 %
10 SYRINGE (ML) INJECTION
Status: DISCONTINUED | OUTPATIENT
Start: 2022-05-05 | End: 2022-05-06 | Stop reason: HOSPADM

## 2022-05-05 RX ADMIN — PIPERACILLIN SODIUM AND TAZOBACTAM SODIUM 4.5 G: 4; .5 INJECTION, POWDER, LYOPHILIZED, FOR SOLUTION INTRAVENOUS at 01:05

## 2022-05-05 RX ADMIN — ASPIRIN 81 MG: 81 TABLET, COATED ORAL at 10:05

## 2022-05-05 RX ADMIN — MICAFUNGIN SODIUM 100 MG: 100 INJECTION, POWDER, LYOPHILIZED, FOR SOLUTION INTRAVENOUS at 10:05

## 2022-05-05 RX ADMIN — APIXABAN 2.5 MG: 2.5 TABLET, FILM COATED ORAL at 09:05

## 2022-05-05 RX ADMIN — PIPERACILLIN SODIUM AND TAZOBACTAM SODIUM 4.5 G: 4; .5 INJECTION, POWDER, LYOPHILIZED, FOR SOLUTION INTRAVENOUS at 09:05

## 2022-05-05 RX ADMIN — APIXABAN 2.5 MG: 2.5 TABLET, FILM COATED ORAL at 10:05

## 2022-05-05 RX ADMIN — FLUTICASONE FUROATE AND VILANTEROL TRIFENATATE 1 PUFF: 100; 25 POWDER RESPIRATORY (INHALATION) at 10:05

## 2022-05-05 RX ADMIN — LOSARTAN POTASSIUM 12.5 MG: 25 TABLET, FILM COATED ORAL at 10:05

## 2022-05-05 RX ADMIN — ATORVASTATIN CALCIUM 20 MG: 20 TABLET, FILM COATED ORAL at 09:05

## 2022-05-05 RX ADMIN — OXYBUTYNIN CHLORIDE 5 MG: 5 TABLET ORAL at 10:05

## 2022-05-05 RX ADMIN — Medication 10 ML: at 05:05

## 2022-05-05 RX ADMIN — METOPROLOL SUCCINATE 25 MG: 25 TABLET, EXTENDED RELEASE ORAL at 10:05

## 2022-05-05 RX ADMIN — PIPERACILLIN SODIUM AND TAZOBACTAM SODIUM 4.5 G: 4; .5 INJECTION, POWDER, LYOPHILIZED, FOR SOLUTION INTRAVENOUS at 05:05

## 2022-05-05 RX ADMIN — OXYBUTYNIN CHLORIDE 5 MG: 5 TABLET ORAL at 09:05

## 2022-05-05 NOTE — PLAN OF CARE
Infectious Diseases Plan of Care Note    Chart reviewed. Spoke with primary team regarding outpatient antifungal therapy. Unfortunately, it may be a number of days before C glabrata susceptibilities result. C glabrata can sometimes be resistant to fluconazole. Therefore, recommend continuing IV micafungin 100 mg daily until susceptibilities are available. Can always switch to PO fluconazole outpatient if needed. Will continue empiric pyelonephritis therapy with PO levofloxacin 750 mg daily as originally discussed. Please see OPAT documentation below.     Outpatient Antibiotic Therapy Plan:    Please send referral to [facility].    1) Infection: Candida glabrata fungemia; pyelonephritis     2) Discharge Antibiotics:    Intravenous antifungal:   IV micafungin 100 mg daily     Oral antibiotics:   PO levofloxacin 750 mg daily     3) Therapy Duration:  14 days for micafungin/fluconazole, 10 days for levofloxacin     Estimated end date of IV antifungal: 5/14/22  Estimated end date of PO antibiotics: 5/11/22    4) Outpatient Weekly Labs:    Order the following labs to be drawn on Mondays:    CBC   CMP     5) Fax Lab Results to Infectious Diseases Provider: Dr Phillips and Dr Winchester     [facility] Fax Number: [phone]    6) Outpatient Infectious Diseases Follow-up     Follow-up appointment will be arranged by the ID clinic and will be found in the patient's appointments tab.     Prior to discharge, please ensure the patient's follow-up has been scheduled.     If there is still no follow-up scheduled prior to discharge, please send an EPIC message to Isela Segovia in Infectious Diseases.

## 2022-05-05 NOTE — ASSESSMENT & PLAN NOTE
· Present on admit. Infectious disease consulted after blood culture returned positive for Candida Glabrata. Patient started on IV Micafungin 100 mg daily to treat and suspect pyelonephritis and right kidney as source.  · Echo done on 5/4 showed no endovascular involvement of heart.   · As per ID Recs:    1) Infection: Candida glabrata fungemia; pyelonephritis      2) Discharge Antibiotics:     Intravenous antifungal:  · IV Micafungin 100 mg daily      Oral antibiotics:  · PO levofloxacin 750 mg daily      3) Therapy Duration:  14 days for micafungin/fluconazole, 10 days for levofloxacin      Estimated end date of IV antifungal: 5/14/22  Estimated end date of PO antibiotics: 5/11/22     4) Outpatient Weekly Labs:     Order the following labs to be drawn on Mondays:   · CBC  · CMP      5) Fax Lab Results to Infectious Diseases Provider: Dr Phillips and Dr Winchester      [facility] Fax Number: [phone]     6) Outpatient Infectious Diseases Follow-up     · Follow-up appointment will be arranged by the ID clinic and will be found in the patient's appointments tab.

## 2022-05-05 NOTE — PLAN OF CARE
MARINE faxed referral to [facility] via [facility] for review. MARINE will continue to follow.      05/05/22 1352   Post-Acute Status   Post-Acute Authorization IV Infusion   IV Infusion Status Referral(s) sent     2:16 PM  MARINE faxed HH Orders to [facility] via [facility] for review. MARINE will continue to follow.     Connie Eduardo LMSW   - [facility]  Ext. [phone]

## 2022-05-05 NOTE — RESPIRATORY THERAPY
RAPID RESPONSE RESPIRATORY CHART CHECK       Chart check completed, no concerns verbalized at this time, instructed to call [phone] for further concerns or assistance.

## 2022-05-05 NOTE — ASSESSMENT & PLAN NOTE
Right sided hydroureteronephrosis  Acute pyelonephritis  · Present on admit. Urology consulted and patient taken to OR and had urgent placement of right ureteral stent for obstructing ureteral stone causing pyelonephritis and MARQUEZ on 4/28.  · Patient's renal function slowly improving after ureteral stent and hydronephrosis on renal ultrasound on 5/1.   · Appreciate Urology assistance and help with case and patient to follow-up with urology as outpatient after infection clears for definitve management of right ureteral stone.   · Continue empiric IV Zosyn in hospital. Urine culture negative x 2 in hospital as per ID recs and plan to switch top po Levaquin on hospital discharge to complete treatment on 5/11.

## 2022-05-05 NOTE — CONSULTS
Double lumen PICC to right basilic vein.  39 cm in length, 31 cm arm circumference and 0 cm exposed.   Lot # [id number].

## 2022-05-05 NOTE — PLAN OF CARE
MARINE faxed HH Orders to [facility] via [facility] for review. MARINE will continue to follow.      05/05/22 2913   Post-Acute Status   Post-Acute Authorization Home Health   Home Health Status Referrals Sent     Connie Eduardo LMSW   - [facility]  Ext. [phone]

## 2022-05-05 NOTE — PLAN OF CARE
[facility] educator met with patient and daughter discussed discharge plan for home IVABX. Misa Barajas will dc home with family support. Patient will infuse medication via Elastomeric Pump. Patient educated on S.A.S.H procedure. S.A.S.H mat provided.  Patient education checklist reviewed and acknowledged by above person(s) above and are agreeable to discharge with home infusion plan of care. IV administration process using aspetic technique was reviewed with successful return demonstration. Patient feels comfortable with infusion. Patient will dc home with micafungin 100 mg IV q 24 hours for estimated end of therapy date 5/14/22. Dosing schedule times is 11 am . Extension placed to right basilic 39 cm double lumen picc placed 5/5/22. [facility] will follow patient for weekly dressing changes and lab draws. Time allotted for questions. Patients nurse and case management team notified teaching has been completed.     Medication rejected for auth. Attempted two different NDC #. Will have to call for appeal. May take 24-48 hours for appeal. Will expedite. Patient and team informed.     Medication delivery will be made to home once medication approved.       [facility]  Dayo Rick Rn, Clinical Educator  Cell [phone]  Office [phone]  Fax [phone]

## 2022-05-05 NOTE — CONSULTS
Single lumen [id number] midline placed RIGHT BASILIC vein. Max dwell date 6/3/22, Lot# [id number].  Needle advanced into the vessel under real time ultrasound guidance.  Image recorded and saved.

## 2022-05-05 NOTE — SUBJECTIVE & OBJECTIVE
Interval History: Patient denies any right sided flank pain. Patient reports she is feeling well and asking about home discharge. I consulted with Infectious Disease team and they are now recommending Micafungin 100 mg IV daily on discharge until 5/14/2022 instead of po Fluconazole as there is a significant resistance with C. Glabrata isolates. PICC line placed this afternoon and tried to arrange for home discharge this evening but having issues with her health insurance for approval for the IV Micafungin so will be unable to get patient discharged home today as planned due to needing auth for the IV Micafungin from insurance. Nephrology okay with PICC line as her renal function and MARQUEZ are improving. Will continue IV Zosyn for now for pyelonephritis as per ID recs and plan to switch to po Levaquin on discharge with end date of 5/11.     Review of Systems   Constitutional:  Negative for fever.   Respiratory:  Negative for cough and shortness of breath.    Cardiovascular:  Negative for chest pain and leg swelling.   Gastrointestinal:  Negative for abdominal pain, diarrhea, nausea and vomiting.   Musculoskeletal:  Negative for arthralgias.   Skin:  Negative for rash.   Neurological:  Negative for dizziness and light-headedness.   Psychiatric/Behavioral:  Negative for agitation, confusion and hallucinations.    Objective:     Vital Signs (Most Recent):  Temp: 98.2 °F (36.8 °C) (05/05/22 1144)  Pulse: 61 (05/05/22 1144)  Resp: 19 (05/05/22 1144)  BP: 156/71 (05/05/22 1144)  SpO2: 93 % (05/05/22 1144) on 2 liters of oxygen   Vital Signs (24h Range):  Temp:  [97.6 °F (36.4 °C)-98.2 °F (36.8 °C)] 98.2 °F (36.8 °C)  Pulse:  [61-84] 61  Resp:  [13-20] 19  SpO2:  [93 %-96 %] 93 %  BP: (107-156)/(54-71) 156/71     Weight: 75.8 kg (167 lb)  Body mass index is 28.67 kg/m².  No intake or output data in the 24 hours ending 05/05/22 1839   Physical Exam  Vitals and nursing note reviewed.   Constitutional:       General: She is not  in acute distress.     Appearance: Normal appearance. She is well-developed and normal weight. She is not ill-appearing.   Eyes:      Conjunctiva/sclera: Conjunctivae normal.   Neck:      Vascular: No JVD.   Cardiovascular:      Rate and Rhythm: Normal rate and regular rhythm.      Heart sounds: Normal heart sounds. No murmur heard.    No friction rub. No gallop.   Pulmonary:      Effort: Pulmonary effort is normal. No respiratory distress.      Breath sounds: Normal breath sounds. No wheezing.   Abdominal:      General: Abdomen is flat. Bowel sounds are normal. There is no distension.      Palpations: Abdomen is soft.      Tenderness: There is no abdominal tenderness.   Musculoskeletal:      Right lower leg: No edema.      Left lower leg: No edema.   Skin:     General: Skin is warm.      Findings: No erythema.   Neurological:      Mental Status: She is alert and oriented to person, place, and time.   Psychiatric:         Mood and Affect: Mood normal.         Behavior: Behavior normal.         Thought Content: Thought content normal.         Judgment: Judgment normal.       Significant Labs: CBC:   Recent Labs   Lab 05/05/22  0308   WBC 6.49   HGB 11.2*   HCT 33.9*        CMP:   Recent Labs   Lab 05/05/22  0308      K 4.1      CO2 26   GLU 67*   BUN 34*   CREATININE 1.8*   CALCIUM 9.4   ANIONGAP 6*   EGFRNONAA 26.6*     Blood Culture, Routine   Date Value Ref Range Status   05/02/2022 No Growth to date  Preliminary   05/02/2022 No Growth to date  Preliminary   05/02/2022 No Growth to date  Preliminary   05/02/2022 No Growth to date  Preliminary     Urine Culture, Routine   Date Value Ref Range Status   04/28/2022 No growth  Final       Significant Imaging: I have reviewed all pertinent imaging results/findings within the past 24 hours.

## 2022-05-05 NOTE — PLAN OF CARE
[facility]    HOME HEALTH ORDERS  FACE TO FACE ENCOUNTER    Patient Name: Misa Barajas   YOB: 1945     PCP: Celia Carlisle MD   PCP Address: [facility] / [place] [address]   PCP Phone Number: [phone]   PCP Fax: [phone]     Encounter Date: 05/05/2022     Discharging Team: [facility]     Admit to Home Health    Diagnoses:  Active Hospital Problems    Diagnosis  POA    *Right ureteral stone [N20.1]  Yes     Priority: 1 - High    Right sided hydroureteronephrosis [N13.30]  Yes     Priority: 1 - High    Acute pyelonephritis [N10]  Yes     Priority: 1 - High    Candidemia [B37.7]  No     Priority: 2     Acute renal failure superimposed on stage 3a chronic kidney disease [N17.9, N18.31]  Yes     Priority: 3     Chronic respiratory failure with hypoxia [J96.11]  Yes     Priority: 4     Centrilobular emphysema [J43.2]  Yes     Priority: 4     Chronic anticoagulation [Z79.01]  Not Applicable     Priority: 5     Paroxysmal atrial fibrillation [I48.0]  Yes     Priority: 5     Essential hypertension [I10]  Yes     Priority: 6     Chronic diastolic heart failure [I50.32]  Yes     Priority: 7       Resolved Hospital Problems   No resolved problems to display.            My clinical findings that support the need for the home health skilled services and home bound status are the following:  Weakness/numbness causing balance and gait disturbance due to Infection and Weakness/Debility making it taxing to leave home.  Requiring assistive device to leave home due to unsteady gait caused by  Infection and Weakness/Debility.    Allergies:   Review of patient's allergies indicates:   Allergen Reactions    Adhesive Rash     Pt states she removed a LATEX bandaid and the skin beneath was swollen and red. No other latex causes a reaction.        Diet: regular diet    Activities: activity as tolerated    Nursing:   SN to complete comprehensive assessment including routine vital  signs. Instruct on disease process and s/s of complications to report to MD. Review/verify medication list sent home with the patient at time of discharge  and instruct patient/caregiver as needed. Frequency may be adjusted depending on start of care date.    Notify MD if SBP > 160 or < 90; DBP > 90 or < 50; HR > 120 or < 50; Temp > 101    Ok to schedule additional visits based on staff availability and patient request on consecutive days within the home health episode.     MISCELLANEOUS CARE:  Home Infusion Therapy:   SN to perform Infusion Therapy/Central Line Care.  Review Central Line Care & Central Line Flush with patient.    Administer (drug and dose): Micafungin 100 mg IV daily and continue until end date of 5/14/2022. May remove PICC catheter once Micafungin has been completed on 5/14/2022.     Last dose given: 5/5/2022 at 1040.    Routine PICC line catheter care     Home Oxygen:  Oxygen at 2 L/min nasal canula to be used:  Continuously., Assess oxygen saturation via pulse oximeter as needed for increase in SOB. and Notify physician if oxygen saturation less than 88%    LABS:  Weekly CBC and CMP on Mondays while patient on Micafungin and fax labs to [facility] with attention to Dr. J Luis Phillips and Dr. Winchester.     [facility] Fax Number: [phone]    WOUND CARE ORDERS  no    Medications: Review discharge medications with patient and family and provide education.         Medication List      START taking these medications    levoFLOXacin 750 MG tablet  Commonly known as: LEVAQUIN  Take 1 tablet (750 mg total) by mouth once daily. End date 5/11/2022. for 6 days     MICAFUNGIN 100 MG/100 ML NS (READY TO MIX SYSTEM)  Inject 100 mLs (100 mg total) into the vein once daily. End date 5/14/2022. for 9 days        CHANGE how you take these medications    rosuvastatin 5 MG tablet  Commonly known as: CRESTOR  Take 1 tablet (5 mg total) by mouth once daily.  What changed: Another  medication with the same name was removed. Continue taking this medication, and follow the directions you see here.        CONTINUE taking these medications    acetaminophen 500 MG tablet  Commonly known as: TYLENOL  Take 2 tablets (1,000 mg total) by mouth every 8 (eight) hours as needed for Pain.     albuterol 0.63 mg/3 mL Nebu  Commonly known as: ACCUNEB  Take 3 mLs (0.63 mg total) by nebulization every 6 (six) hours as needed. Rescue     apixaban 5 mg Tab  Commonly known as: ELIQUIS  Take 5 mg by mouth once daily.     aspirin 81 MG EC tablet  Commonly known as: ECOTRIN  Take 81 mg by mouth once daily.     biotin 1 mg tablet  Take 1,000 mcg by mouth 3 (three) times daily.     BREZTRI AEROSPHERE 160-9-4.8 mcg/actuation Hfaa  Generic drug: budesonide-glycopyr-formoterol  Inhale 2 puffs into the lungs 2 (two) times daily.     fish oil-omega-3 fatty acids 300-1,000 mg capsule  Take 2 g by mouth once daily.     furosemide 20 MG tablet  Commonly known as: LASIX  Take 1 tablet by mouth daily If weight gain 2 to3 lbs for 2 to 5 days. If no improvement, call MD     inhalation spacing device  Use as directed for inhalation.     losartan 25 MG tablet  Commonly known as: COZAAR  Take 1 tablet (25 mg total) by mouth once daily.     metoprolol succinate 100 MG 24 hr tablet  Commonly known as: TOPROL-XL  Take 1 tablet (100 mg total) by mouth once daily.     ondansetron 4 MG tablet  Commonly known as: ZOFRAN  Take 1 tablet (4 mg total) by mouth every 8 (eight) hours as needed for Nausea.     vitamin D 1000 units Tab  Commonly known as: VITAMIN D3  TAKE 2 TABLETS ONE TIME DAILY              I certify that this patient is confined to her home and needs intermittent skilled nursing care.    _________________________________  Angelica Raygoza MD  05/05/2022

## 2022-05-05 NOTE — PROCEDURES
"Misa Barajas is a 76 y.o. female patient.    Temp: 98.2 °F (36.8 °C) (05/05/22 1144)  Pulse: 61 (05/05/22 1144)  Resp: 19 (05/05/22 1144)  BP: (!) 156/71 (05/05/22 1144)  SpO2: (!) 93 % (05/05/22 1144)  Weight: 75.8 kg (167 lb) (05/04/22 1201)  Height: 5' 4" (162.6 cm) (05/04/22 1201)    PICC  Date/Time: 5/5/2022 4:10 PM  Performed by: Lupe Jasmine RN  Assisting provider: Shant Jeong RN  Consent Done: Yes  Time out: Immediately prior to procedure a time out was called to verify the correct patient, procedure, equipment, support staff and site/side marked as required  Indications: med administration and vascular access  Anesthesia: local infiltration  Local anesthetic: lidocaine 1% without epinephrine  Anesthetic Total (mL): 3  Description of findings: PICC  Preparation: skin prepped with ChloraPrep  Skin prep agent dried: skin prep agent completely dried prior to procedure  Sterile barriers: all five maximum sterile barriers used - cap, mask, sterile gown, sterile gloves, and large sterile sheet  Hand hygiene: hand hygiene performed prior to central venous catheter insertion  Location details: right basilic  Catheter type: double lumen  Catheter size: 5 Fr  Catheter Length: 39cm    Ultrasound guidance: yes  Vessel Caliber: medium and patent, compressibility normal  Vascular Doppler: not done  Needle advanced into vessel with real time Ultrasound guidance.  Guidewire confirmed in vessel.  Image recorded and saved.  Sterile sheath used.  Number of attempts: 1  Post-procedure: blood return through all ports, chlorhexidine patch and sterile dressing applied  Technical procedures used: 3CG  Specimens: No  Implants: No  Assessment: placement verified by x-ray  Complications: none          Name   5/5/2022  "

## 2022-05-06 VITALS
HEIGHT: 64 IN | TEMPERATURE: 98 F | HEART RATE: 67 BPM | DIASTOLIC BLOOD PRESSURE: 65 MMHG | SYSTOLIC BLOOD PRESSURE: 131 MMHG | BODY MASS INDEX: 28.51 KG/M2 | OXYGEN SATURATION: 94 % | WEIGHT: 167 LBS | RESPIRATION RATE: 17 BRPM

## 2022-05-06 LAB
ANION GAP SERPL CALC-SCNC: 9 MMOL/L (ref 8–16)
BACTERIA BLD CULT: NORMAL
BUN SERPL-MCNC: 25 MG/DL (ref 8–23)
CALCIUM SERPL-MCNC: 9.5 MG/DL (ref 8.7–10.5)
CHLORIDE SERPL-SCNC: 111 MMOL/L (ref 95–110)
CO2 SERPL-SCNC: 24 MMOL/L (ref 23–29)
CREAT SERPL-MCNC: 1.4 MG/DL (ref 0.5–1.4)
EST. GFR  (AFRICAN AMERICAN): 41.5 ML/MIN/1.73 M^2
EST. GFR  (NON AFRICAN AMERICAN): 36 ML/MIN/1.73 M^2
GLUCOSE SERPL-MCNC: 84 MG/DL (ref 70–110)
POTASSIUM SERPL-SCNC: 4.1 MMOL/L (ref 3.5–5.1)
SODIUM SERPL-SCNC: 144 MMOL/L (ref 136–145)

## 2022-05-06 PROCEDURE — 80048 BASIC METABOLIC PNL TOTAL CA: CPT | Performed by: INTERNAL MEDICINE

## 2022-05-06 PROCEDURE — 63600175 PHARM REV CODE 636 W HCPCS: Mod: JG | Performed by: INTERNAL MEDICINE

## 2022-05-06 PROCEDURE — 94761 N-INVAS EAR/PLS OXIMETRY MLT: CPT

## 2022-05-06 PROCEDURE — 1111F DSCHRG MED/CURRENT MED MERGE: CPT | Mod: CPTII,,, | Performed by: INTERNAL MEDICINE

## 2022-05-06 PROCEDURE — 99239 PR HOSPITAL DISCHARGE DAY,>30 MIN: ICD-10-PCS | Mod: ,,, | Performed by: INTERNAL MEDICINE

## 2022-05-06 PROCEDURE — A4216 STERILE WATER/SALINE, 10 ML: HCPCS | Performed by: INTERNAL MEDICINE

## 2022-05-06 PROCEDURE — 25000003 PHARM REV CODE 250: Performed by: INTERNAL MEDICINE

## 2022-05-06 PROCEDURE — 1111F PR DISCHARGE MEDS RECONCILED W/ CURRENT OUTPATIENT MED LIST: ICD-10-PCS | Mod: CPTII,,, | Performed by: INTERNAL MEDICINE

## 2022-05-06 PROCEDURE — 27000221 HC OXYGEN, UP TO 24 HOURS

## 2022-05-06 PROCEDURE — 99239 HOSP IP/OBS DSCHRG MGMT >30: CPT | Mod: ,,, | Performed by: INTERNAL MEDICINE

## 2022-05-06 PROCEDURE — 94640 AIRWAY INHALATION TREATMENT: CPT

## 2022-05-06 PROCEDURE — 36415 COLL VENOUS BLD VENIPUNCTURE: CPT | Performed by: INTERNAL MEDICINE

## 2022-05-06 PROCEDURE — 25000003 PHARM REV CODE 250: Performed by: FAMILY MEDICINE

## 2022-05-06 RX ADMIN — METOPROLOL SUCCINATE 25 MG: 25 TABLET, EXTENDED RELEASE ORAL at 10:05

## 2022-05-06 RX ADMIN — Medication 10 ML: at 12:05

## 2022-05-06 RX ADMIN — FLUTICASONE FUROATE AND VILANTEROL TRIFENATATE 1 PUFF: 100; 25 POWDER RESPIRATORY (INHALATION) at 08:05

## 2022-05-06 RX ADMIN — OXYBUTYNIN CHLORIDE 5 MG: 5 TABLET ORAL at 10:05

## 2022-05-06 RX ADMIN — MICAFUNGIN SODIUM 100 MG: 100 INJECTION, POWDER, LYOPHILIZED, FOR SOLUTION INTRAVENOUS at 10:05

## 2022-05-06 RX ADMIN — APIXABAN 2.5 MG: 2.5 TABLET, FILM COATED ORAL at 10:05

## 2022-05-06 RX ADMIN — LOSARTAN POTASSIUM 12.5 MG: 25 TABLET, FILM COATED ORAL at 10:05

## 2022-05-06 RX ADMIN — ASPIRIN 81 MG: 81 TABLET, COATED ORAL at 10:05

## 2022-05-06 NOTE — PROGRESS NOTES
[facility] ([address] Medicine  Progress Note    Patient Name: Misa Barajas  MRN: [id number]  Patient Class: IP- Inpatient   Admission Date: 4/28/2022  Length of Stay: 7 days  Attending Physician: Angelica Raygoza MD  Primary Care Provider: Celia Carlisle MD        Subjective:     Principal Problem:Right ureteral stone        HPI:  Misa Barajas is a 76 y.o. female with medical history of COPD, CRF on O2, Diastolic HF (EF 65%), Parox AFib on Eliquis, HTN, HLD, CKD, Hx of Breast cancer s/p L mastectomy who was admitted to the hospital for an infected kidney stone. Patient reports 5 days of hematuria with chills. No reported fever. Concerned she is having a side effect of her Eliquis. Denies abdominal pain, flank pain, dysuria, diarrhea, constipation.   CT RSS obtained in the ED showed severe right hydroureteronephrosis to the level of a 1.3cm right distal ureteral stone. WBC 7.7. Cr 2.0 (BL 1.2). UA nitrite negative, >100 RBC, 21 WBC, no bacteria. Febrile to 101.5 in the ED. Urology was consulted. She was taken to the OR for class A cystoscopy with right ureteral stent placement.  She was seen in the PACU and has no current pain.       Overview/Hospital Course:  She was taken straight to the OR with Urology for stent placement for right ureteral stone causing hydronephrosis and pyelonephritis of right kidney. There were no complications. Urine culture was negative x 2. Patient started on empiric IV Zosyn to treat pyelonephritis. Blood culture grew yeast and prelim cultures with Candida Glabrata and sensitivities pending. ID consulted and patient was placed on IV Micafungin 100 mg daily to treat. ID was consulted. Patient also had MARQUEZ on admit and nephrology consulted and felt related to kidney infection and hydronephrosis and stated should improve with antibiotics and ureteral stent placement. Repeat renal U/S after stent placed by Urology with improvement in hydronephrosis.  Echo done to make sure no endovascular involvement with candidemia and echo on 5/4 showed no endocarditis. ID recommending on discharge okay to switch to Levaquin 750 mg po daily to complete treatment course of pyelonephritis with end date 5/11 but continue Zosyn while in hospital. Patient to be discharged as per ID recs on Micafungin 100 mg IV daily for Candida Glabrata candidemia with end date 5/14/2022. PICC lien place with nephrology approval on 5/5. MARQUEZ continues to improve and resolve on 5/5. Plan is to discharge home once home IV Micafungin can be arranged.         Interval History: Patient denies any right sided flank pain. Patient reports she is feeling well and asking about home discharge. I consulted with Infectious Disease team and they are now recommending Micafungin 100 mg IV daily on discharge until 5/14/2022 instead of po Fluconazole as there is a significant resistance with C. Glabrata isolates. PICC line placed this afternoon and tried to arrange for home discharge this evening but having issues with her health insurance for approval for the IV Micafungin so will be unable to get patient discharged home today as planned due to needing auth for the IV Micafungin from insurance. Nephrology okay with PICC line as her renal function and MARQUEZ are improving. Will continue IV Zosyn for now for pyelonephritis as per ID recs and plan to switch to po Levaquin on discharge with end date of 5/11.     Review of Systems   Constitutional:  Negative for fever.   Respiratory:  Negative for cough and shortness of breath.    Cardiovascular:  Negative for chest pain and leg swelling.   Gastrointestinal:  Negative for abdominal pain, diarrhea, nausea and vomiting.   Musculoskeletal:  Negative for arthralgias.   Skin:  Negative for rash.   Neurological:  Negative for dizziness and light-headedness.   Psychiatric/Behavioral:  Negative for agitation, confusion and hallucinations.    Objective:     Vital Signs (Most  Recent):  Temp: 98.2 °F (36.8 °C) (05/05/22 1144)  Pulse: 61 (05/05/22 1144)  Resp: 19 (05/05/22 1144)  BP: 156/71 (05/05/22 1144)  SpO2: 93 % (05/05/22 1144) on 2 liters of oxygen   Vital Signs (24h Range):  Temp:  [97.6 °F (36.4 °C)-98.2 °F (36.8 °C)] 98.2 °F (36.8 °C)  Pulse:  [61-84] 61  Resp:  [13-20] 19  SpO2:  [93 %-96 %] 93 %  BP: (107-156)/(54-71) 156/71     Weight: 75.8 kg (167 lb)  Body mass index is 28.67 kg/m².  No intake or output data in the 24 hours ending 05/05/22 1839   Physical Exam  Vitals and nursing note reviewed.   Constitutional:       General: She is not in acute distress.     Appearance: Normal appearance. She is well-developed and normal weight. She is not ill-appearing.   Eyes:      Conjunctiva/sclera: Conjunctivae normal.   Neck:      Vascular: No JVD.   Cardiovascular:      Rate and Rhythm: Normal rate and regular rhythm.      Heart sounds: Normal heart sounds. No murmur heard.    No friction rub. No gallop.   Pulmonary:      Effort: Pulmonary effort is normal. No respiratory distress.      Breath sounds: Normal breath sounds. No wheezing.   Abdominal:      General: Abdomen is flat. Bowel sounds are normal. There is no distension.      Palpations: Abdomen is soft.      Tenderness: There is no abdominal tenderness.   Musculoskeletal:      Right lower leg: No edema.      Left lower leg: No edema.   Skin:     General: Skin is warm.      Findings: No erythema.   Neurological:      Mental Status: She is alert and oriented to person, place, and time.   Psychiatric:         Mood and Affect: Mood normal.         Behavior: Behavior normal.         Thought Content: Thought content normal.         Judgment: Judgment normal.       Significant Labs: CBC:   Recent Labs   Lab 05/05/22  0308   WBC 6.49   HGB 11.2*   HCT 33.9*        CMP:   Recent Labs   Lab 05/05/22  0308      K 4.1      CO2 26   GLU 67*   BUN 34*   CREATININE 1.8*   CALCIUM 9.4   ANIONGAP 6*   EGFRNONAA 26.6*      Blood Culture, Routine   Date Value Ref Range Status   05/02/2022 No Growth to date  Preliminary   05/02/2022 No Growth to date  Preliminary   05/02/2022 No Growth to date  Preliminary   05/02/2022 No Growth to date  Preliminary     Urine Culture, Routine   Date Value Ref Range Status   04/28/2022 No growth  Final       Significant Imaging: I have reviewed all pertinent imaging results/findings within the past 24 hours.      Assessment/Plan:      * Right ureteral stone  Right sided hydroureteronephrosis  Acute pyelonephritis  · Present on admit. Urology consulted and patient taken to OR and had urgent placement of right ureteral stent for obstructing ureteral stone causing pyelonephritis and MARQUEZ on 4/28.  · Patient's renal function slowly improving after ureteral stent and hydronephrosis on renal ultrasound on 5/1.   · Appreciate Urology assistance and help with case and patient to follow-up with urology as outpatient after infection clears for definitve management of right ureteral stone.   · Continue empiric IV Zosyn in hospital. Urine culture negative x 2 in hospital as per ID recs and plan to switch top po Levaquin on hospital discharge to complete treatment on 5/11.     Candidemia  · Present on admit. Infectious disease consulted after blood culture returned positive for Candida Glabrata. Patient started on IV Micafungin 100 mg daily to treat and suspect pyelonephritis and right kidney as source.  · Echo done on 5/4 showed no endovascular involvement of heart.   · As per ID Recs:    1) Infection: Candida glabrata fungemia; pyelonephritis      2) Discharge Antibiotics:     Intravenous antifungal:  · IV Micafungin 100 mg daily      Oral antibiotics:  · PO levofloxacin 750 mg daily      3) Therapy Duration:  14 days for micafungin/fluconazole, 10 days for levofloxacin      Estimated end date of IV antifungal: 5/14/22  Estimated end date of PO antibiotics: 5/11/22     4) Outpatient Weekly Labs:     Order the  following labs to be drawn on Mondays:   · CBC  · CMP      5) Fax Lab Results to Infectious Diseases Provider: Dr Phillips and Dr Winchester      [facility] Fax Number: [phone]     6) Outpatient Infectious Diseases Follow-up     · Follow-up appointment will be arranged by the ID clinic and will be found in the patient's appointments tab.    Acute renal failure superimposed on stage 3a chronic kidney disease  · Renal function improving and creatinine down to 1.8 on 5/5 from 2.0 on 5/4 and 2.1 from admit. Baseline Cr 1.3-1.5.   · Related to obstructing ureteral stone and pyelonephritis on right side.   · Nephrology consulted and assisting on case and renal function improving after ureteral stent placed and treatment of pyelonephritis.  · Closely monitor urine output.   · Avoid any nephrotoxic agents such as NSAIDS, IV contrast or aminoglycosides that could worsen renal function.  · Renally adjust all medications.   · Monitor daily BMP to follow renal function.     Centrilobular emphysema  Chronic respiratory failure with hypoxia  · Chronic and controlled. Patient on home oxygen at 2 liters and on 2 liters in hospital and will continue.  · Patient with no signs of acute exacerbation.   · Patient on Breo inhaler in hospital to treat and will continue. Patient on budesonide-glycopyr-formoterol (BREZTRI AEROSPHERE) 160-9-4.8 mcg/actuation HFAA 2 puffs BID at home but not on formulary so using Breo inhaler instead.     Paroxysmal atrial fibrillation  Chronic anticoagulation  Patient with Paroxysmal (<7 days) atrial fibrillation which is controlled currently with Toprol XL 25 mg po daily. Patient is currently in sinus rhythm.[id number] Score: 3. Anticoagulation indicated. Anticoagulation done with Apixiban 2.5 mg po BID.      Essential hypertension  · Patient's blood pressure is controlled here in the hospital over past 24 hours.   · Goal for blood pressure is SBP < 140 and DBP < 90 as patient > or = 60 years of age  and patient has advanced chronic kidney disease based on JNC 8 guidelines.   · Plan to continue home regimen to treat of Losartan 25 mg po daily + Toprol XL 25 mg po daily while patient is hospitalized.   · Plan is to monitor patient's blood pressure routinely while patient is hospitalized.     Chronic diastolic heart failure  Patient is identified as having Diastolic (HFpEF) heart failure that is Chronic. CHF is currently controlled. Latest ECHO performed and demonstrates- Results for orders placed during the hospital encounter of 04/28/22    Echo    Interpretation Summary  · The left ventricle is normal in size with normal systolic function.  · The estimated ejection fraction is 60%.  · There are segmental left ventricular wall motion abnormalities.  · There is abnormal septal wall motion.  · Indeterminate left ventricular diastolic function.  · Normal right ventricular size with normal right ventricular systolic function.  · Mild mitral regurgitation.  · Mild tricuspid regurgitation.  · Intermediate central venous pressure (8 mmHg).  · The estimated PA systolic pressure is 31 mmHg.  Continue Toprol XL, Losartan and Furosemide to treat and monitor clinical status closely. Monitor on telemetry. Patient is off CHF pathway.  Monitor strict Is&Os and daily weights.  Place on fluid restriction of 1.5 L. Continue to stress to patient importance of self efficacy and  on diet for CHF.      VTE Risk Mitigation (From admission, onward)         Ordered     apixaban tablet 2.5 mg  2 times daily         04/29/22 1209     IP VTE HIGH RISK PATIENT  Once         04/28/22 1723     Place sequential compression device  Until discontinued         04/28/22 1723                Discharge Planning   NOHEMI: 5/6/2022     Code Status: Full Code   Is the patient medically ready for discharge?: Yes    Reason for patient still in hospital (select all that apply): Patient trending condition  Discharge Plan A: Home          Angelica RASHEED  MD [place]  Department of Hospital Medicine   [facility] - Intensive Care ([place]

## 2022-05-06 NOTE — PLAN OF CARE
Visited with patient and daughters. Patient receiving micafungin at this time. Flushed right basilic 39 cm double lumen picc with no issues. Answered questions per daughters satisfaction. Medication delivery will be made to home. No further infusion needs.     [facility]  Dayo Rick Rn, Clinical Educator  Cell [phone]  Office [phone]  Fax [phone]

## 2022-05-06 NOTE — ASSESSMENT & PLAN NOTE
Patient is identified as having Diastolic (HFpEF) heart failure that is Chronic. CHF is currently controlled. Latest ECHO performed and demonstrates- Results for orders placed during the hospital encounter of 04/28/22    Echo    Interpretation Summary  · The left ventricle is normal in size with normal systolic function.  · The estimated ejection fraction is 60%.  · There are segmental left ventricular wall motion abnormalities.  · There is abnormal septal wall motion.  · Indeterminate left ventricular diastolic function.  · Normal right ventricular size with normal right ventricular systolic function.  · Mild mitral regurgitation.  · Mild tricuspid regurgitation.  · Intermediate central venous pressure (8 mmHg).  · The estimated PA systolic pressure is 31 mmHg.  Continue Toprol XL, Losartan and Furosemide to treat and monitor clinical status closely. Monitor on telemetry. Patient is off CHF pathway.  Monitor strict Is&Os and daily weights.  Place on fluid restriction of 1.5 L. Continue to stress to patient importance of self efficacy and  on diet for CHF.

## 2022-05-06 NOTE — NURSING
Nurse at bedside. Newly placed PICC in right arm, swollen, warm to touch with pain to insertion site area per pt. No other arm pain or discomfort voiced at time. On call provider messaged through secured chat. Orders received for doppler to Right Arm. Nurse elevated right arm on two pillows. Plan of care discussed with pt with questions asked and answered. Reported findings to charge as well. Will continue to closely monitor for worsening signs.

## 2022-05-06 NOTE — ASSESSMENT & PLAN NOTE
· Patient's blood pressure is controlled here in the hospital over past 24 hours.   · Goal for blood pressure is SBP < 140 and DBP < 90 as patient > or = 60 years of age and patient has advanced chronic kidney disease based on JNC 8 guidelines.   · Plan to continue home regimen to treat of Losartan 25 mg po daily + Toprol XL 25 mg po daily while patient is hospitalized.   · Plan is to monitor patient's blood pressure routinely while patient is hospitalized.

## 2022-05-06 NOTE — ASSESSMENT & PLAN NOTE
· Renal function improving and creatinine down to 1.8 on 5/5 from 2.0 on 5/4 and 2.1 from admit. Baseline Cr 1.3-1.5.   · Related to obstructing ureteral stone and pyelonephritis on right side.   · Nephrology consulted and assisting on case and renal function improving after ureteral stent placed and treatment of pyelonephritis.  · Closely monitor urine output.   · Avoid any nephrotoxic agents such as NSAIDS, IV contrast or aminoglycosides that could worsen renal function.  · Renally adjust all medications.   · Monitor daily BMP to follow renal function.

## 2022-05-06 NOTE — ASSESSMENT & PLAN NOTE
Chronic respiratory failure with hypoxia  · Chronic and controlled. Patient on home oxygen at 2 liters and on 2 liters in hospital and will continue.  · Patient with no signs of acute exacerbation.   · Patient on Breo inhaler in hospital to treat and will continue. Patient on budesonide-glycopyr-formoterol (BREZTRI AEROSPHERE) 160-9-4.8 mcg/actuation HFAA 2 puffs BID at home but not on formulary so using Breo inhaler instead.

## 2022-05-06 NOTE — NURSING
Patient received discharge paperwork and medication delivered to the bedside. Daughters present at bedside. All belongings gathered. No apparent distress or discomfort present.

## 2022-05-06 NOTE — ASSESSMENT & PLAN NOTE
Chronic anticoagulation  Patient with Paroxysmal (<7 days) atrial fibrillation which is controlled currently with Toprol XL 25 mg po daily. Patient is currently in sinus rhythm.[id number] Score: 3. Anticoagulation indicated. Anticoagulation done with Apixiban 2.5 mg po BID.

## 2022-05-07 PROBLEM — N17.9 ACUTE RENAL FAILURE SUPERIMPOSED ON STAGE 3A CHRONIC KIDNEY DISEASE: Status: RESOLVED | Noted: 2022-05-02 | Resolved: 2022-05-07

## 2022-05-07 PROBLEM — N18.31 ACUTE RENAL FAILURE SUPERIMPOSED ON STAGE 3A CHRONIC KIDNEY DISEASE: Status: RESOLVED | Noted: 2022-05-02 | Resolved: 2022-05-07

## 2022-05-07 LAB — BACTERIA BLD CULT: NORMAL

## 2022-05-07 NOTE — ASSESSMENT & PLAN NOTE
Right sided hydroureteronephrosis  Acute pyelonephritis  · Present on admit. Urology consulted and patient taken to OR and had urgent placement of right ureteral stent for obstructing ureteral stone causing pyelonephritis and MARQUEZ on 4/28.  · Patient's renal function slowly improving after ureteral stent and hydronephrosis on renal ultrasound on 5/1.   · Appreciate Urology assistance and help with case and patient to follow-up with urology as outpatient after infection clears for definitve management of right ureteral stone.   · Continue empiric IV Zosyn in hospital. Urine culture negative x 2 in hospital as per ID recs patient switched to Levaquin 750 mg po daily on hospital discharge to complete treatment on 5/11.   · Patient will need follow-up in Urology clinic in next 2-3 weeks after antibiotics and anti-fungal treatment complete to discuss timing for definitive stone treatment.

## 2022-05-07 NOTE — ASSESSMENT & PLAN NOTE
· Patient back to her baseline renal function of Cr 1.4 on day of discharge on 5/6. MARQUEZ resolved on discharge.   · Renal function improving and creatinine down to 1.8 on 5/5 from 2.0 on 5/4 and 2.1 from admit. Baseline Cr 1.3-1.5.   · Related to obstructing ureteral stone and pyelonephritis on right side.   · Nephrology consulted and assisting on case and renal function improving after ureteral stent placed and treatment of pyelonephritis.  · Closely monitor urine output.   · Avoid any nephrotoxic agents such as NSAIDS, IV contrast or aminoglycosides that could worsen renal function.  · Renally adjust all medications.   · Monitor daily BMP to follow renal function.

## 2022-05-07 NOTE — ASSESSMENT & PLAN NOTE
Chronic respiratory failure with hypoxia  · Chronic and controlled. Patient on home oxygen at 2 liters and on 2 liters in hospital and will continue on discharge.   · Patient with no signs of acute exacerbation.   · Patient on Breo inhaler in hospital to treat and will continue. Patient on budesonide-glycopyr-formoterol (BREZTRI AEROSPHERE) 160-9-4.8 mcg/actuation HFAA 2 puffs BID at home but not on formulary so using Breo inhaler instead. Patient to resume her normal inhaler on hospital discharge.

## 2022-05-07 NOTE — ASSESSMENT & PLAN NOTE
· Present on admit. Infectious disease consulted after blood culture returned positive for Candida Glabrata. Patient started on IV Micafungin 100 mg daily to treat and suspect pyelonephritis and right kidney as source.  · Echo done on 5/4 showed no endovascular involvement of heart.   · As per ID Recs:    1) Infection: Candida glabrata fungemia; pyelonephritis      2) Discharge Antibiotics:     Intravenous antifungal:  · IV Micafungin 100 mg daily      Oral antibiotics:  · PO levofloxacin 750 mg daily      3) Therapy Duration:  14 days for micafungin/fluconazole, 10 days for levofloxacin      Estimated end date of IV antifungal: 5/14/22  Estimated end date of PO antibiotics: 5/11/22     4) Outpatient Weekly Labs:     Order the following labs to be drawn on Mondays:   · CBC  · CMP      5) Fax Lab Results to Infectious Diseases Provider: Dr Phillips and Dr Winchester      [facility] Fax Number: [phone]     6) Outpatient Infectious Diseases Follow-up     · Follow-up appointment will be arranged by the ID clinic and will be found in the patient's appointments tab.

## 2022-05-07 NOTE — DISCHARGE SUMMARY
[facility] - Intensive Care ([address] Medicine  Discharge Summary      Patient Name: Misa Barajas  MRN: [id number]  Patient Class: IP- Inpatient  Admission Date: 4/28/2022  Hospital Length of Stay: 8 days  Discharge Date and Time: 5/6/2022  4:16 PM  Attending Physician: Angelica Raygoza MD   Discharging Provider: Angelica Raygoza MD  Primary Care Provider: Celia Carlisle MD  [facility] Medicine Team: [facility] HOSP MED  Angelica Raygoza MD    HPI:   Misa Barajas is a 76 y.o. female with medical history of COPD, CRF on O2, Diastolic HF (EF 65%), Parox AFib on Eliquis, HTN, HLD, CKD, Hx of Breast cancer s/p L mastectomy who was admitted to the hospital for an infected kidney stone. Patient reports 5 days of hematuria with chills. No reported fever. Concerned she is having a side effect of her Eliquis. Denies abdominal pain, flank pain, dysuria, diarrhea, constipation.   CT RSS obtained in the ED showed severe right hydroureteronephrosis to the level of a 1.3cm right distal ureteral stone. WBC 7.7. Cr 2.0 (BL 1.2). UA nitrite negative, >100 RBC, 21 WBC, no bacteria. Febrile to 101.5 in the ED. Urology was consulted. She was taken to the OR for class A cystoscopy with right ureteral stent placement.  She was seen in the PACU and has no current pain.       4/28/2022  Procedure(s) (LRB):  CYSTOSCOPY  INSERTION, STENT, URETER (Right)  PYELOGRAM, RETROGRADE (Right)    Surgeon(s):  MD Vik Patel MD      Hospital Course:   She was taken straight to the OR with Urology for stent placement for right ureteral stone causing hydronephrosis and pyelonephritis of right kidney. There were no complications. Urine culture was negative x 2. Patient started on empiric IV Zosyn to treat pyelonephritis. Blood culture grew yeast and prelim cultures with Candida Glabrata and sensitivities pending. ID consulted and patient was placed on IV Micafungin 100 mg daily to treat. ID was  consulted. Patient also had MARQUEZ on admit and nephrology consulted and felt related to kidney infection and hydronephrosis and stated should improve with antibiotics and ureteral stent placement. Repeat renal U/S after stent placed by Urology with improvement in hydronephrosis. Echo done to make sure no endovascular involvement with candidemia and echo on 5/4 showed no endocarditis. ID recommending on discharge okay to switch to Levaquin 750 mg po daily to complete treatment course of pyelonephritis with end date 5/11 but continue Zosyn while in hospital. Patient to be discharged as per ID recs on Micafungin 100 mg IV daily for Candida Glabrata candidemia with end date 5/14/2022. PICC line placed with Nephrology approval on 5/5. MARQUEZ continues to improve and resolve on 5/5 and on 5/6 and down to Cr 1.4 on day of discharge. Patient discharged home with Micafungin 100 mg IV daily infusion to treat Candida Glabrata fungemia until 5/14/2022 as per ID recs. Patient switched to Levaquin 750 mg po daily from IV Zosyn on discharge to treat pyelonephritis until 5/11/2022 as per ID recs. Patient will need follow-up in Infectious Disease clinic in 2 weeks after discharge and urology clinic within next 1 month for definitve stone treatment. Patient to get weekly labs while on Micafungin.          Goals of Care Treatment Preferences:  Code Status: Full Code      Consults:   Consults (From admission, onward)        Status Ordering Provider     [facility]  Once        Provider:  (Not yet assigned)    Completed JOHANA FERGUSON     Inpatient consult to Nephrology  Once        Provider:  (Not yet assigned)    Completed WINNIE MULLIGAN     Inpatient consult to Infectious Diseases  Once        Provider:  (Not yet assigned)    Completed WINNIE MULLIGAN     Inpatient consult to Urology  Once        Provider:  (Not yet assigned)    Completed ISAÍAS REYES          * Right ureteral stone  Right sided  hydroureteronephrosis  Acute pyelonephritis  · Present on admit. Urology consulted and patient taken to OR and had urgent placement of right ureteral stent for obstructing ureteral stone causing pyelonephritis and MARQUEZ on 4/28.  · Patient's renal function slowly improving after ureteral stent and hydronephrosis on renal ultrasound on 5/1.   · Appreciate Urology assistance and help with case and patient to follow-up with urology as outpatient after infection clears for definitve management of right ureteral stone.   · Continue empiric IV Zosyn in hospital. Urine culture negative x 2 in hospital as per ID recs patient switched to Levaquin 750 mg po daily on hospital discharge to complete treatment on 5/11.   · Patient will need follow-up in Urology clinic in next 2-3 weeks after antibiotics and anti-fungal treatment complete to discuss timing for definitive stone treatment.     Candidemia  · Present on admit. Infectious disease consulted after blood culture returned positive for Candida Glabrata. Patient started on IV Micafungin 100 mg daily to treat and suspect pyelonephritis and right kidney as source.  · Echo done on 5/4 showed no endovascular involvement of heart.   · As per ID Recs:    1) Infection: Candida glabrata fungemia; pyelonephritis      2) Discharge Antibiotics:     Intravenous antifungal:  · IV Micafungin 100 mg daily      Oral antibiotics:  · PO levofloxacin 750 mg daily      3) Therapy Duration:  14 days for micafungin/fluconazole, 10 days for levofloxacin      Estimated end date of IV antifungal: 5/14/22  Estimated end date of PO antibiotics: 5/11/22     4) Outpatient Weekly Labs:     Order the following labs to be drawn on Mondays:   · CBC  · CMP      5) Fax Lab Results to Infectious Diseases Provider: Dr Phillips and Dr Winchester      [facility] Fax Number: [phone]     6) Outpatient Infectious Diseases Follow-up     · Follow-up appointment will be arranged by the ID clinic and will be found  in the patient's appointments tab.    Acute renal failure superimposed on stage 3a chronic kidney disease-resolved as of 5/7/2022  · Patient back to her baseline renal function of Cr 1.4 on day of discharge on 5/6. MARQUEZ resolved on discharge.   · Renal function improving and creatinine down to 1.8 on 5/5 from 2.0 on 5/4 and 2.1 from admit. Baseline Cr 1.3-1.5.   · Related to obstructing ureteral stone and pyelonephritis on right side.   · Nephrology consulted and assisting on case and renal function improving after ureteral stent placed and treatment of pyelonephritis.  · Closely monitor urine output.   · Avoid any nephrotoxic agents such as NSAIDS, IV contrast or aminoglycosides that could worsen renal function.  · Renally adjust all medications.   · Monitor daily BMP to follow renal function.     Centrilobular emphysema  Chronic respiratory failure with hypoxia  · Chronic and controlled. Patient on home oxygen at 2 liters and on 2 liters in hospital and will continue on discharge.   · Patient with no signs of acute exacerbation.   · Patient on Breo inhaler in hospital to treat and will continue. Patient on budesonide-glycopyr-formoterol (BREZTRI AEROSPHERE) 160-9-4.8 mcg/actuation HFAA 2 puffs BID at home but not on formulary so using Breo inhaler instead. Patient to resume her normal inhaler on hospital discharge.     Paroxysmal atrial fibrillation  Chronic anticoagulation  Patient with Paroxysmal (<7 days) atrial fibrillation which is controlled currently with Toprol XL 25 mg po daily and will continue on discharge. Patient is currently in sinus rhythm.[id number] Score: 3. Anticoagulation indicated. Anticoagulation done with Apixiban 2.5 mg po BID and continue on hospital discharge.      Essential hypertension  · Patient's blood pressure is controlled here in the hospital.  · Goal for blood pressure is SBP < 140 and DBP < 90 as patient > or = 60 years of age and patient has advanced chronic kidney disease based  on JNC 8 guidelines.   · Plan to continue home regimen to treat of Losartan 25 mg po daily + Toprol XL 25 mg po daily on discharge.       Chronic diastolic heart failure  Patient is identified as having Diastolic (HFpEF) heart failure that is Chronic. CHF is currently controlled. Latest ECHO performed and demonstrates- Results for orders placed during the hospital encounter of 04/28/22    Echo    Interpretation Summary  · The left ventricle is normal in size with normal systolic function.  · The estimated ejection fraction is 60%.  · There are segmental left ventricular wall motion abnormalities.  · There is abnormal septal wall motion.  · Indeterminate left ventricular diastolic function.  · Normal right ventricular size with normal right ventricular systolic function.  · Mild mitral regurgitation.  · Mild tricuspid regurgitation.  · Intermediate central venous pressure (8 mmHg).  · The estimated PA systolic pressure is 31 mmHg.  Continue Toprol XL, Losartan and Furosemide to treat on discharge.     Final Active Diagnoses:    Diagnosis Date Noted POA    PRINCIPAL PROBLEM:  Right ureteral stone [N20.1] 04/28/2022 Yes    Candidemia [B37.7] 05/01/2022 No    Centrilobular emphysema [J43.2] 06/22/2021 Yes    Paroxysmal atrial fibrillation [I48.0] 06/07/2021 Yes    Essential hypertension [I10] 05/28/2015 Yes    Chronic diastolic heart failure [I50.32] 12/05/2021 Yes    Right sided hydroureteronephrosis [N13.30] 05/04/2022 Yes    Chronic anticoagulation [Z79.01] 05/04/2022 Not Applicable    Acute pyelonephritis [N10] 05/04/2022 Yes    Chronic respiratory failure with hypoxia [J96.11] 06/26/2021 Yes      Problems Resolved During this Admission:    Diagnosis Date Noted Date Resolved POA    Acute renal failure superimposed on stage 3a chronic kidney disease [N17.9, N18.31] 05/02/2022 05/07/2022 Yes       Discharged Condition: good    Disposition: Home-Health Care [facility]    Follow Up:  Future Appointments   Date Time  Provider Department Center   5/16/2022  3:00 PM Celia Carlisle MD [facility]        Follow-up Information     [facility] .    Contact information:  [address]  [place] [address]  [phone]                     Patient Instructions:      Ambulatory referral/consult to Outpatient Case Management   Referral Priority: Routine Referral Type: Consultation   Referral Reason: Specialty Services Required   Number of Visits Requested: 1     Ambulatory referral/consult to Community Care   Standing Status: Future   Referral Priority: Routine Referral Type: Consultation   Referral Location: [facility]   Requested Specialty: Community Care   Number of Visits Requested: 1     Diet Adult Regular     Notify your health care provider if you experience any of the following:  temperature >100.4     Notify your health care provider if you experience any of the following:  increased confusion or weakness     Notify your health care provider if you experience any of the following:  persistent dizziness, light-headedness, or visual disturbances     Notify your health care provider if you experience any of the following:  worsening rash     Notify your health care provider if you experience any of the following:  severe persistent headache     Notify your health care provider if you experience any of the following:  difficulty breathing or increased cough     Notify your health care provider if you experience any of the following:  redness, tenderness, or signs of infection (pain, swelling, redness, odor or green/yellow discharge around incision site)     Notify your health care provider if you experience any of the following:  severe uncontrolled pain     Notify your health care provider if you experience any of the following:  persistent nausea and vomiting or diarrhea     Activity as tolerated       Significant Diagnostic Studies: Labs:   CMP   Recent Labs    Lab 05/06/22  0246      K 4.1   *   CO2 24   GLU 84   BUN 25*   CREATININE 1.4   CALCIUM 9.5   ANIONGAP 9   ESTGFRAFRICA 41.5*   EGFRNONAA 36.0*     Blood Culture, Routine   Date Value Ref Range Status   05/02/2022 No growth after 5 days.  Final   05/01/2022 No growth after 5 days.  Final   04/28/2022 Gram stain rossi bottle: yeast  Preliminary   04/28/2022   Preliminary    Results called to and read back by:Tenisha Barnard RN 04/30/2022  17:32   04/28/2022 05/02/2022  01:28  Preliminary   04/28/2022 (A)  Preliminary    TIFFANIE GLABRATA  Susceptibility pending  ID consult required at [facility]ai[facility].Erika Tobias and [place] bill.       Urine Culture, Routine   Date Value Ref Range Status   04/28/2022 No growth  Final       Hemoglobin   Date Value Ref Range Status   05/05/2022 11.2 (L) 12.0 - 16.0 g/dL Final   04/29/2022 12.7 12.0 - 16.0 g/dL Final   04/28/2022 15.1 12.0 - 16.0 g/dL Final   06/29/2021 13.2 12.0 - 16.0 g/dL Final   06/28/2021 12.8 12.0 - 16.0 g/dL Final     Hematocrit   Date Value Ref Range Status   05/05/2022 33.9 (L) 37.0 - 48.5 % Final   04/29/2022 39.7 37.0 - 48.5 % Final   04/28/2022 45.8 37.0 - 48.5 % Final   06/29/2021 41.0 37.0 - 48.5 % Final   06/28/2021 39.2 37.0 - 48.5 % Final       Pending Diagnostic Studies:     None         Medications:  Reconciled Home Medications:      Medication List      START taking these medications    levoFLOXacin 750 MG tablet  Commonly known as: LEVAQUIN  Take 1 tablet (750 mg total) by mouth once daily. End date 5/11/2022. for 6 days     MICAFUNGIN 100 MG/100 ML NS (READY TO MIX SYSTEM)  Inject 100 mLs (100 mg total) into the vein once daily. End date 5/14/2022. for 9 days        CHANGE how you take these medications    rosuvastatin 5 MG tablet  Commonly known as: CRESTOR  Take 1 tablet (5 mg total) by mouth once daily.  What changed: Another medication with the same name was removed. Continue taking this medication, and follow the directions you see  here.        CONTINUE taking these medications    acetaminophen 500 MG tablet  Commonly known as: TYLENOL  Take 2 tablets (1,000 mg total) by mouth every 8 (eight) hours as needed for Pain.     albuterol 0.63 mg/3 mL Nebu  Commonly known as: ACCUNEB  Take 3 mLs (0.63 mg total) by nebulization every 6 (six) hours as needed. Rescue     apixaban 5 mg Tab  Commonly known as: ELIQUIS  Take 5 mg by mouth once daily.     aspirin 81 MG EC tablet  Commonly known as: ECOTRIN  Take 81 mg by mouth once daily.     biotin 1 mg tablet  Take 1,000 mcg by mouth 3 (three) times daily.     BREZTRI AEROSPHERE 160-9-4.8 mcg/actuation Hfaa  Generic drug: budesonide-glycopyr-formoterol  Inhale 2 puffs into the lungs 2 (two) times daily.     fish oil-omega-3 fatty acids 300-1,000 mg capsule  Take 2 g by mouth once daily.     furosemide 20 MG tablet  Commonly known as: LASIX  Take 1 tablet by mouth daily If weight gain 2 to3 lbs for 2 to 5 days. If no improvement, call MD     inhalation spacing device  Use as directed for inhalation.     losartan 25 MG tablet  Commonly known as: COZAAR  Take 1 tablet (25 mg total) by mouth once daily.     metoprolol succinate 100 MG 24 hr tablet  Commonly known as: TOPROL-XL  Take 1 tablet (100 mg total) by mouth once daily.     ondansetron 4 MG tablet  Commonly known as: ZOFRAN  Take 1 tablet (4 mg total) by mouth every 8 (eight) hours as needed for Nausea.     vitamin D 1000 units Tab  Commonly known as: VITAMIN D3  TAKE 2 TABLETS ONE TIME DAILY            Indwelling Lines/Drains at time of discharge:   Lines/Drains/Airways     Peripherally Inserted Central Catheter Line  Duration           PICC Double Lumen 05/05/22 1609 right basilic 2 days                Time spent on the discharge of patient: 33 minutes         Angelica Raygoza MD  Department of Hospital Medicine  [facility] - Intensive Care ([place]-[facility])

## 2022-05-07 NOTE — ASSESSMENT & PLAN NOTE
· Patient's blood pressure is controlled here in the hospital.  · Goal for blood pressure is SBP < 140 and DBP < 90 as patient > or = 60 years of age and patient has advanced chronic kidney disease based on JNC 8 guidelines.   · Plan to continue home regimen to treat of Losartan 25 mg po daily + Toprol XL 25 mg po daily on discharge.

## 2022-05-07 NOTE — ASSESSMENT & PLAN NOTE
Chronic anticoagulation  Patient with Paroxysmal (<7 days) atrial fibrillation which is controlled currently with Toprol XL 25 mg po daily and will continue on discharge. Patient is currently in sinus rhythm.[id number] Score: 3. Anticoagulation indicated. Anticoagulation done with Apixiban 2.5 mg po BID and continue on hospital discharge.

## 2022-05-07 NOTE — ASSESSMENT & PLAN NOTE
Patient is identified as having Diastolic (HFpEF) heart failure that is Chronic. CHF is currently controlled. Latest ECHO performed and demonstrates- Results for orders placed during the hospital encounter of 04/28/22    Echo    Interpretation Summary  · The left ventricle is normal in size with normal systolic function.  · The estimated ejection fraction is 60%.  · There are segmental left ventricular wall motion abnormalities.  · There is abnormal septal wall motion.  · Indeterminate left ventricular diastolic function.  · Normal right ventricular size with normal right ventricular systolic function.  · Mild mitral regurgitation.  · Mild tricuspid regurgitation.  · Intermediate central venous pressure (8 mmHg).  · The estimated PA systolic pressure is 31 mmHg.  Continue Toprol XL, Losartan and Furosemide to treat on discharge.

## 2022-05-09 ENCOUNTER — TELEPHONE (OUTPATIENT)
Dept: UROLOGY | Facility: CLINIC | Age: 77
End: 2022-05-09
Payer: MEDICARE

## 2022-05-09 ENCOUNTER — LAB VISIT (OUTPATIENT)
Dept: LAB | Facility: HOSPITAL | Age: 77
End: 2022-05-09
Attending: STUDENT IN AN ORGANIZED HEALTH CARE EDUCATION/TRAINING PROGRAM
Payer: MEDICARE

## 2022-05-09 DIAGNOSIS — B37.7 CANDIDEMIA: Primary | ICD-10-CM

## 2022-05-09 DIAGNOSIS — N20.0 NEPHROLITHIASIS: Primary | ICD-10-CM

## 2022-05-09 LAB
ALBUMIN SERPL BCP-MCNC: 2.6 G/DL (ref 3.5–5.2)
ALP SERPL-CCNC: 99 U/L (ref 55–135)
ALT SERPL W/O P-5'-P-CCNC: 28 U/L (ref 10–44)
ANION GAP SERPL CALC-SCNC: 13 MMOL/L (ref 8–16)
AST SERPL-CCNC: 42 U/L (ref 10–40)
BASOPHILS # BLD AUTO: 0.05 K/UL (ref 0–0.2)
BASOPHILS NFR BLD: 0.6 % (ref 0–1.9)
BILIRUB SERPL-MCNC: 0.6 MG/DL (ref 0.1–1)
BUN SERPL-MCNC: 16 MG/DL (ref 8–23)
CALCIUM SERPL-MCNC: 9.7 MG/DL (ref 8.7–10.5)
CHLORIDE SERPL-SCNC: 107 MMOL/L (ref 95–110)
CO2 SERPL-SCNC: 23 MMOL/L (ref 23–29)
CREAT SERPL-MCNC: 1.3 MG/DL (ref 0.5–1.4)
DIFFERENTIAL METHOD: ABNORMAL
EOSINOPHIL # BLD AUTO: 0.3 K/UL (ref 0–0.5)
EOSINOPHIL NFR BLD: 3.2 % (ref 0–8)
ERYTHROCYTE [DISTWIDTH] IN BLOOD BY AUTOMATED COUNT: 13.3 % (ref 11.5–14.5)
EST. GFR  (AFRICAN AMERICAN): 45 ML/MIN/1.73 M^2
EST. GFR  (NON AFRICAN AMERICAN): 39 ML/MIN/1.73 M^2
GLUCOSE SERPL-MCNC: 84 MG/DL (ref 70–110)
HCT VFR BLD AUTO: 33.7 % (ref 37–48.5)
HGB BLD-MCNC: 10.9 G/DL (ref 12–16)
IMM GRANULOCYTES # BLD AUTO: 0.07 K/UL (ref 0–0.04)
IMM GRANULOCYTES NFR BLD AUTO: 0.8 % (ref 0–0.5)
LYMPHOCYTES # BLD AUTO: 1.7 K/UL (ref 1–4.8)
LYMPHOCYTES NFR BLD: 19.2 % (ref 18–48)
MCH RBC QN AUTO: 30 PG (ref 27–31)
MCHC RBC AUTO-ENTMCNC: 32.3 G/DL (ref 32–36)
MCV RBC AUTO: 93 FL (ref 82–98)
MONOCYTES # BLD AUTO: 1 K/UL (ref 0.3–1)
MONOCYTES NFR BLD: 11 % (ref 4–15)
NEUTROPHILS # BLD AUTO: 5.9 K/UL (ref 1.8–7.7)
NEUTROPHILS NFR BLD: 65.2 % (ref 38–73)
NRBC BLD-RTO: 0 /100 WBC
PLATELET # BLD AUTO: 284 K/UL (ref 150–450)
PMV BLD AUTO: 9.6 FL (ref 9.2–12.9)
POTASSIUM SERPL-SCNC: 3.8 MMOL/L (ref 3.5–5.1)
PROT SERPL-MCNC: 5.5 G/DL (ref 6–8.4)
RBC # BLD AUTO: 3.63 M/UL (ref 4–5.4)
SODIUM SERPL-SCNC: 143 MMOL/L (ref 136–145)
WBC # BLD AUTO: 9.07 K/UL (ref 3.9–12.7)

## 2022-05-09 PROCEDURE — 85025 COMPLETE CBC W/AUTO DIFF WBC: CPT | Performed by: STUDENT IN AN ORGANIZED HEALTH CARE EDUCATION/TRAINING PROGRAM

## 2022-05-09 PROCEDURE — 80053 COMPREHEN METABOLIC PANEL: CPT | Performed by: STUDENT IN AN ORGANIZED HEALTH CARE EDUCATION/TRAINING PROGRAM

## 2022-05-09 PROCEDURE — G0180 MD CERTIFICATION HHA PATIENT: HCPCS | Mod: ,,, | Performed by: INTERNAL MEDICINE

## 2022-05-09 PROCEDURE — 36415 COLL VENOUS BLD VENIPUNCTURE: CPT | Performed by: STUDENT IN AN ORGANIZED HEALTH CARE EDUCATION/TRAINING PROGRAM

## 2022-05-09 PROCEDURE — G0180 PR HOME HEALTH MD CERTIFICATION: ICD-10-PCS | Mod: ,,, | Performed by: INTERNAL MEDICINE

## 2022-05-09 RX ORDER — ONDANSETRON 4 MG/1
4 TABLET, ORALLY DISINTEGRATING ORAL ONCE
Qty: 10 TABLET | Refills: 0 | Status: SHIPPED | OUTPATIENT
Start: 2022-05-09 | End: 2022-05-09

## 2022-05-09 NOTE — TELEPHONE ENCOUNTER
----- Message from Kwabena Anand MD sent at 4/29/2022  5:03 PM CDT -----  Please schedule this patient for right ureteroscopy with laser lithotripsy and stent exchange in a couple weeks.     This patient got a cysto stent yesterday due to a 1.3cm distal right ureteral stone, fever, and MARQUEZ. She is currently admitted to hospital medicine. Urine culture pending. You have done ESWL and ureteroscopy on her in the past and she asked for her follow up to be with you.    Thanks,  CAROLINE Anand

## 2022-05-09 NOTE — TELEPHONE ENCOUNTER
Patient has f/u with pcp 5/16/22. Can we get an earlier appointment this week if possible?  Daughter reports she is a little shaky and perhaps a little confused.   Has some nausea. Sending zofran. They are trying to get her to drink more. Recommend probiotics/fermented foods.     They want to postpone procedure until after pcp appt and she is stronger. Schedule for 5/23/22. Case put on. Forgot to put the time for surgery - schedule for 1.5 hours.   Will need to drop off a urine culture one week prior.   Can stay on eliquis and aspirin.   Can see if cardiology wants her to stop 1-2 days before.

## 2022-05-11 RX ORDER — LOSARTAN POTASSIUM 25 MG/1
25 TABLET ORAL DAILY
Qty: 30 TABLET | Refills: 0 | Status: SHIPPED | OUTPATIENT
Start: 2022-05-11 | End: 2022-06-07 | Stop reason: SDUPTHER

## 2022-05-11 NOTE — TELEPHONE ENCOUNTER
----- Message from Stephany Cruz sent at 5/11/2022  8:06 AM CDT -----  Contact: [phone]  Type:  RX Refill Request    Who Called: pt called  Refill or New Rx:#refill  RX Name and Strength:losartan (COZAAR) 25 MG tablet  How is the patient currently taking it? (ex. 1XDay):  Is this a 30 day or 90 day RX:90 days  Preferred Pharmacy with phone number:[facility] Mail/Pickup  Local or Mail Order:local  Ordering Provider:Dr. Carlisle   Would the patient rather a call back or a response via [facility]? Call back  Best Call Back Number:[phone]  Additional Information:

## 2022-05-11 NOTE — TELEPHONE ENCOUNTER
No new care gaps identified.  [facility] Embedded Care Gaps. Reference number: [id number]. 5/11/2022   8:15:29 AM CDT

## 2022-05-12 ENCOUNTER — PATIENT OUTREACH (OUTPATIENT)
Dept: ADMINISTRATIVE | Facility: OTHER | Age: 77
End: 2022-05-12
Payer: MEDICARE

## 2022-05-12 ENCOUNTER — HOSPITAL ENCOUNTER (OUTPATIENT)
Facility: HOSPITAL | Age: 77
Discharge: HOME | End: 2022-05-13
Attending: EMERGENCY MEDICINE | Admitting: EMERGENCY MEDICINE
Payer: MEDICARE

## 2022-05-12 DIAGNOSIS — Z95.828 STATUS POST PICC CENTRAL LINE PLACEMENT: ICD-10-CM

## 2022-05-12 DIAGNOSIS — I50.9 ACUTE ON CHRONIC CONGESTIVE HEART FAILURE, UNSPECIFIED HEART FAILURE TYPE: ICD-10-CM

## 2022-05-12 DIAGNOSIS — R93.89 ABNORMAL VENTRICULAR WALL MOTION: ICD-10-CM

## 2022-05-12 DIAGNOSIS — R07.9 CHEST PAIN: ICD-10-CM

## 2022-05-12 DIAGNOSIS — R10.11 RIGHT UPPER QUADRANT PAIN: ICD-10-CM

## 2022-05-12 DIAGNOSIS — R07.9 CHEST PAIN, UNSPECIFIED TYPE: Primary | ICD-10-CM

## 2022-05-12 PROBLEM — R10.13 EPIGASTRIC ABDOMINAL PAIN: Status: ACTIVE | Noted: 2022-05-12

## 2022-05-12 LAB
ALBUMIN SERPL BCP-MCNC: 2.7 G/DL (ref 3.5–5.2)
ALP SERPL-CCNC: 92 U/L (ref 55–135)
ALT SERPL W/O P-5'-P-CCNC: 26 U/L (ref 10–44)
ANION GAP SERPL CALC-SCNC: 8 MMOL/L (ref 8–16)
AST SERPL-CCNC: 40 U/L (ref 10–40)
BASOPHILS # BLD AUTO: 0.07 K/UL (ref 0–0.2)
BASOPHILS NFR BLD: 0.8 % (ref 0–1.9)
BILIRUB SERPL-MCNC: 0.9 MG/DL (ref 0.1–1)
BNP SERPL-MCNC: 422 PG/ML (ref 0–99)
BUN SERPL-MCNC: 21 MG/DL (ref 8–23)
CALCIUM SERPL-MCNC: 10.2 MG/DL (ref 8.7–10.5)
CHLORIDE SERPL-SCNC: 103 MMOL/L (ref 95–110)
CO2 SERPL-SCNC: 26 MMOL/L (ref 23–29)
CREAT SERPL-MCNC: 1.7 MG/DL (ref 0.5–1.4)
DIFFERENTIAL METHOD: ABNORMAL
EOSINOPHIL # BLD AUTO: 0.2 K/UL (ref 0–0.5)
EOSINOPHIL NFR BLD: 2.3 % (ref 0–8)
ERYTHROCYTE [DISTWIDTH] IN BLOOD BY AUTOMATED COUNT: 13.3 % (ref 11.5–14.5)
EST. GFR  (AFRICAN AMERICAN): 32.8 ML/MIN/1.73 M^2
EST. GFR  (NON AFRICAN AMERICAN): 28.5 ML/MIN/1.73 M^2
GLUCOSE SERPL-MCNC: 103 MG/DL (ref 70–110)
GLUCOSE SERPL-MCNC: 99 MG/DL (ref 70–110)
HCT VFR BLD AUTO: 34.8 % (ref 37–48.5)
HGB BLD-MCNC: 11.3 G/DL (ref 12–16)
IMM GRANULOCYTES # BLD AUTO: 0.05 K/UL (ref 0–0.04)
IMM GRANULOCYTES NFR BLD AUTO: 0.5 % (ref 0–0.5)
LIPASE SERPL-CCNC: 21 U/L (ref 4–60)
LYMPHOCYTES # BLD AUTO: 1.7 K/UL (ref 1–4.8)
LYMPHOCYTES NFR BLD: 18.3 % (ref 18–48)
MCH RBC QN AUTO: 29.8 PG (ref 27–31)
MCHC RBC AUTO-ENTMCNC: 32.5 G/DL (ref 32–36)
MCV RBC AUTO: 92 FL (ref 82–98)
MONOCYTES # BLD AUTO: 0.9 K/UL (ref 0.3–1)
MONOCYTES NFR BLD: 10.2 % (ref 4–15)
NEUTROPHILS # BLD AUTO: 6.3 K/UL (ref 1.8–7.7)
NEUTROPHILS NFR BLD: 67.9 % (ref 38–73)
NRBC BLD-RTO: 0 /100 WBC
PLATELET # BLD AUTO: 270 K/UL (ref 150–450)
PMV BLD AUTO: 9.6 FL (ref 9.2–12.9)
POCT GLUCOSE: 102 MG/DL (ref 70–110)
POTASSIUM SERPL-SCNC: 3.7 MMOL/L (ref 3.5–5.1)
PROT SERPL-MCNC: 6.1 G/DL (ref 6–8.4)
RBC # BLD AUTO: 3.79 M/UL (ref 4–5.4)
SODIUM SERPL-SCNC: 137 MMOL/L (ref 136–145)
TROPONIN I SERPL DL<=0.01 NG/ML-MCNC: 0.03 NG/ML (ref 0–0.03)
WBC # BLD AUTO: 9.2 K/UL (ref 3.9–12.7)

## 2022-05-12 PROCEDURE — 84484 ASSAY OF TROPONIN QUANT: CPT | Mod: 91 | Performed by: EMERGENCY MEDICINE

## 2022-05-12 PROCEDURE — 99285 EMERGENCY DEPT VISIT HI MDM: CPT | Mod: ,,, | Performed by: EMERGENCY MEDICINE

## 2022-05-12 PROCEDURE — 83880 ASSAY OF NATRIURETIC PEPTIDE: CPT | Performed by: EMERGENCY MEDICINE

## 2022-05-12 PROCEDURE — 99285 PR EMERGENCY DEPT VISIT,LEVEL V: ICD-10-PCS | Mod: ,,, | Performed by: EMERGENCY MEDICINE

## 2022-05-12 PROCEDURE — 83690 ASSAY OF LIPASE: CPT | Performed by: EMERGENCY MEDICINE

## 2022-05-12 PROCEDURE — 84484 ASSAY OF TROPONIN QUANT: CPT | Mod: 91

## 2022-05-12 PROCEDURE — 96375 TX/PRO/DX INJ NEW DRUG ADDON: CPT

## 2022-05-12 PROCEDURE — 63600175 PHARM REV CODE 636 W HCPCS: Mod: JG

## 2022-05-12 PROCEDURE — G0378 HOSPITAL OBSERVATION PER HR: HCPCS

## 2022-05-12 PROCEDURE — 96365 THER/PROPH/DIAG IV INF INIT: CPT

## 2022-05-12 PROCEDURE — 99220 PR INITIAL OBSERVATION CARE,LEVL III: CPT | Mod: ,,,

## 2022-05-12 PROCEDURE — 99220 PR INITIAL OBSERVATION CARE,LEVL III: ICD-10-PCS | Mod: ,,,

## 2022-05-12 PROCEDURE — 85025 COMPLETE CBC W/AUTO DIFF WBC: CPT | Performed by: EMERGENCY MEDICINE

## 2022-05-12 PROCEDURE — 80053 COMPREHEN METABOLIC PANEL: CPT | Performed by: EMERGENCY MEDICINE

## 2022-05-12 PROCEDURE — 99285 EMERGENCY DEPT VISIT HI MDM: CPT | Mod: 25

## 2022-05-12 PROCEDURE — 63600175 PHARM REV CODE 636 W HCPCS: Performed by: EMERGENCY MEDICINE

## 2022-05-12 RX ORDER — FUROSEMIDE 10 MG/ML
40 INJECTION INTRAMUSCULAR; INTRAVENOUS
Status: COMPLETED | OUTPATIENT
Start: 2022-05-12 | End: 2022-05-12

## 2022-05-12 RX ORDER — SODIUM CHLORIDE 0.9 % (FLUSH) 0.9 %
5 SYRINGE (ML) INJECTION
Status: DISCONTINUED | OUTPATIENT
Start: 2022-05-12 | End: 2022-05-13 | Stop reason: HOSPADM

## 2022-05-12 RX ORDER — NALOXONE HCL 0.4 MG/ML
0.02 VIAL (ML) INJECTION
Status: DISCONTINUED | OUTPATIENT
Start: 2022-05-12 | End: 2022-05-13 | Stop reason: HOSPADM

## 2022-05-12 RX ORDER — SODIUM CHLORIDE 0.9 % (FLUSH) 0.9 %
10 SYRINGE (ML) INJECTION
Status: DISCONTINUED | OUTPATIENT
Start: 2022-05-12 | End: 2022-05-13 | Stop reason: HOSPADM

## 2022-05-12 RX ORDER — OMEGA-3/DHA/EPA/FISH OIL 300-1000MG
1 CAPSULE,DELAYED RELEASE (ENTERIC COATED) ORAL DAILY
Status: DISCONTINUED | OUTPATIENT
Start: 2022-05-12 | End: 2022-05-12

## 2022-05-12 RX ORDER — ASPIRIN 325 MG
325 TABLET ORAL
Status: ACTIVE | OUTPATIENT
Start: 2022-05-12 | End: 2022-05-12

## 2022-05-12 RX ORDER — METOPROLOL SUCCINATE 100 MG/1
100 TABLET, EXTENDED RELEASE ORAL DAILY
Status: DISCONTINUED | OUTPATIENT
Start: 2022-05-13 | End: 2022-05-13 | Stop reason: HOSPADM

## 2022-05-12 RX ORDER — ATORVASTATIN CALCIUM 20 MG/1
20 TABLET, FILM COATED ORAL DAILY
Refills: 0 | Status: DISCONTINUED | OUTPATIENT
Start: 2022-05-13 | End: 2022-05-13 | Stop reason: HOSPADM

## 2022-05-12 RX ORDER — ACETAMINOPHEN 500 MG
1000 TABLET ORAL EVERY 8 HOURS PRN
Status: DISCONTINUED | OUTPATIENT
Start: 2022-05-12 | End: 2022-05-13 | Stop reason: HOSPADM

## 2022-05-12 RX ORDER — ACETAMINOPHEN 325 MG/1
650 TABLET ORAL EVERY 4 HOURS PRN
Status: DISCONTINUED | OUTPATIENT
Start: 2022-05-12 | End: 2022-05-13 | Stop reason: HOSPADM

## 2022-05-12 RX ORDER — FLUTICASONE PROPIONATE 50 MCG
1 SPRAY, SUSPENSION (ML) NASAL DAILY PRN
COMMUNITY

## 2022-05-12 RX ORDER — ASPIRIN 81 MG/1
81 TABLET ORAL DAILY
Status: DISCONTINUED | OUTPATIENT
Start: 2022-05-13 | End: 2022-05-13 | Stop reason: HOSPADM

## 2022-05-12 RX ORDER — IPRATROPIUM BROMIDE AND ALBUTEROL SULFATE 2.5; .5 MG/3ML; MG/3ML
3 SOLUTION RESPIRATORY (INHALATION) EVERY 4 HOURS PRN
Status: DISCONTINUED | OUTPATIENT
Start: 2022-05-12 | End: 2022-05-13 | Stop reason: HOSPADM

## 2022-05-12 RX ORDER — IBUPROFEN 200 MG
24 TABLET ORAL
Status: DISCONTINUED | OUTPATIENT
Start: 2022-05-12 | End: 2022-05-13 | Stop reason: HOSPADM

## 2022-05-12 RX ORDER — BISACODYL 10 MG
10 SUPPOSITORY, RECTAL RECTAL DAILY PRN
Status: DISCONTINUED | OUTPATIENT
Start: 2022-05-12 | End: 2022-05-13 | Stop reason: HOSPADM

## 2022-05-12 RX ORDER — ONDANSETRON 8 MG/1
8 TABLET, ORALLY DISINTEGRATING ORAL EVERY 8 HOURS PRN
Status: DISCONTINUED | OUTPATIENT
Start: 2022-05-12 | End: 2022-05-13 | Stop reason: HOSPADM

## 2022-05-12 RX ORDER — MAG HYDROX/ALUMINUM HYD/SIMETH 200-200-20
30 SUSPENSION, ORAL (FINAL DOSE FORM) ORAL 4 TIMES DAILY PRN
Status: DISCONTINUED | OUTPATIENT
Start: 2022-05-12 | End: 2022-05-13 | Stop reason: HOSPADM

## 2022-05-12 RX ORDER — IBUPROFEN 200 MG
16 TABLET ORAL
Status: DISCONTINUED | OUTPATIENT
Start: 2022-05-12 | End: 2022-05-13 | Stop reason: HOSPADM

## 2022-05-12 RX ORDER — SIMETHICONE 80 MG
1 TABLET,CHEWABLE ORAL 4 TIMES DAILY PRN
Status: DISCONTINUED | OUTPATIENT
Start: 2022-05-12 | End: 2022-05-13 | Stop reason: HOSPADM

## 2022-05-12 RX ORDER — LOSARTAN POTASSIUM 25 MG/1
25 TABLET ORAL DAILY
Status: DISCONTINUED | OUTPATIENT
Start: 2022-05-13 | End: 2022-05-13 | Stop reason: HOSPADM

## 2022-05-12 RX ORDER — TALC
6 POWDER (GRAM) TOPICAL NIGHTLY PRN
Status: DISCONTINUED | OUTPATIENT
Start: 2022-05-12 | End: 2022-05-13 | Stop reason: HOSPADM

## 2022-05-12 RX ORDER — PROCHLORPERAZINE EDISYLATE 5 MG/ML
5 INJECTION INTRAMUSCULAR; INTRAVENOUS EVERY 6 HOURS PRN
Status: DISCONTINUED | OUTPATIENT
Start: 2022-05-12 | End: 2022-05-13 | Stop reason: HOSPADM

## 2022-05-12 RX ORDER — GLUCAGON 1 MG
1 KIT INJECTION
Status: DISCONTINUED | OUTPATIENT
Start: 2022-05-12 | End: 2022-05-13 | Stop reason: HOSPADM

## 2022-05-12 RX ADMIN — MICAFUNGIN SODIUM 100 MG: 100 INJECTION, POWDER, LYOPHILIZED, FOR SOLUTION INTRAVENOUS at 03:05

## 2022-05-12 RX ADMIN — FUROSEMIDE 40 MG: 10 INJECTION, SOLUTION INTRAMUSCULAR; INTRAVENOUS at 10:05

## 2022-05-12 NOTE — ASSESSMENT & PLAN NOTE
Patient reports episode of epigastric/mid-sternal CP onset acutely this morning while getting dressed. Denies abdominal pain, CP, SOB at present  - Troponin 0.03 --> 0.029, will repeat x1  - RUQ US without cholecystitis   - EKG without ST elevation or depression   - Nuclear stress echo ordered for the am, NPO at midnight  - Consider cardiology consult if elevated repeat troponin or positive stress test

## 2022-05-12 NOTE — ASSESSMENT & PLAN NOTE
- BP currently well-controlled  - Continue home regimen of losartan 25 mg daily  - Will continue to monitor and further titrate antihypertensives as clinically indicated

## 2022-05-12 NOTE — SUBJECTIVE & OBJECTIVE
Past Medical History:   Diagnosis Date    Acute hypoxemic respiratory failure 6/26/2021    Acute superficial venous thrombosis of lower extremity, bilateral 1/25/2022    Aortic atherosclerosis     Arthritis     knee joint pain    Breast cancer 2002    left breast & lymph nodes-s/p sx with chemo    Bronchitis     with flu-2/2014    Cataracts, bilateral     Chronic anticoagulation 5/4/2022    Chronic diastolic heart failure 12/24/2019    Chronic kidney disease, stage 3     History of chemotherapy     last treatment 12/2002 (had 8 treatments)    Hyperlipidemia 11/20/2016    Hypertension     Lymphedema of both lower extremities 1/25/2022    Nephrolithiasis 6/2/2014    Osteoporosis     Paroxysmal atrial fibrillation 6/7/2021    Renal calculi     hematuria    Renal osteodystrophy 1/14/2016    Venous stasis dermatitis of both lower extremities 1/25/2022    Vitamin D insufficiency        Past Surgical History:   Procedure Laterality Date    BREAST BIOPSY Left 2002    core bx, +    BREAST BIOPSY Right 2018    core    BREAST BIOPSY Right 2019    BREAST LUMPECTOMY Left 2002    CYSTOSCOPY  4/28/2022    Procedure: CYSTOSCOPY;  Surgeon: Vik Ware MD;  Location: [facility] OR [address] [place] [address];  Service: Urology;;    LITHOTRIPSY      MASTECTOMY Left 06/2002    left-& lymph node dissection    RETROGRADE PYELOGRAPHY Right 4/28/2022    Procedure: PYELOGRAM, RETROGRADE;  Surgeon: Vik Ware MD;  Location: [facility] OR [address] [place] [address];  Service: Urology;  Laterality: Right;    ROBOT-ASSISTED LAPAROSCOPIC PARTIAL NEPHRECTOMY USING DA JESÚS XI Right 3/11/2021    Procedure: XI ROBOTIC NEPHRECTOMY, PARTIAL;  Surgeon: Taz Ortega MD;  Location: [facility] OR [address] [place] [address];  Service: Urology;  Laterality: Right;  4hr/ gen with regional  [place] confirmation:  [id number] for 11:15am case [place]    ureteroscopy Bilateral     6.14       Review of patient's allergies indicates:   Allergen Reactions    Adhesive Rash     Pt states she removed a LATEX bandaid and the  skin beneath was swollen and red. No other latex causes a reaction.       No current facility-administered medications on file prior to encounter.     Current Outpatient Medications on File Prior to Encounter   Medication Sig    acetaminophen (TYLENOL) 500 MG tablet Take 2 tablets (1,000 mg total) by mouth every 8 (eight) hours as needed for Pain.    apixaban (ELIQUIS) 5 mg Tab Take 5 mg by mouth once daily.    aspirin (ECOTRIN) 81 MG EC tablet Take 81 mg by mouth once daily.    biotin 1 mg tablet Take 1,000 mcg by mouth once daily.    budesonide-glycopyr-formoterol (BREZTRI AEROSPHERE) 160-9-4.8 mcg/actuation HFAA Inhale 2 puffs into the lungs 2 (two) times daily.    fish oil-omega-3 fatty acids 300-1,000 mg capsule Take 1 capsule by mouth once daily.    fluticasone propionate (FLONASE) 50 mcg/actuation nasal spray 1 spray by Each Nostril route daily as needed (congestion).    furosemide (LASIX) 20 MG tablet Take 1 tablet by mouth daily If weight gain 2 to3 lbs for 2 to 5 days. If no improvement, call MD (Patient taking differently: Take 1 tablet by mouth daily If weight gain 2 to3 lbs for 2 to 5 days. If no improvement, call MD)    losartan (COZAAR) 25 MG tablet Take 1 tablet (25 mg total) by mouth once daily.    metoprolol succinate (TOPROL-XL) 100 MG 24 hr tablet Take 1 tablet (100 mg total) by mouth once daily.    micafungin sodium (MICAFUNGIN 100 MG/100 ML NS, READY TO MIX SYSTEM,) Inject 100 mLs (100 mg total) into the vein once daily. End date 5/14/2022. for 9 days    ondansetron (ZOFRAN) 4 MG tablet Take 1 tablet (4 mg total) by mouth every 8 (eight) hours as needed for Nausea.    rosuvastatin (CRESTOR) 5 MG tablet Take 1 tablet (5 mg total) by mouth once daily.    vitamin D (VITAMIN D3) 1000 units Tab TAKE 2 TABLETS ONE TIME DAILY    inhalation spacing device Use as directed for inhalation.    [DISCONTINUED] albuterol (ACCUNEB) 0.63 mg/3 mL Nebu Take 3 mLs (0.63 mg total) by nebulization every 6 (six)  hours as needed. Rescue    [DISCONTINUED] levoFLOXacin (LEVAQUIN) 750 MG tablet Take 1 tablet (750 mg total) by mouth once daily. End date 2022. for 6 days     Family History       Problem Relation (Age of Onset)    Arthritis Mother, Sister    Bone cancer Mother    Breast cancer Mother, Sister    Cancer Father    Crohn's disease Daughter    Fibroids Daughter    No Known Problems Brother, Daughter          Tobacco Use    Smoking status: Former Smoker     Packs/day: 1.00     Years: 50.00     Pack years: 50.00     Types: Cigarettes     Start date:      Quit date: 2009     Years since quittin.9    Smokeless tobacco: Never Used   Substance and Sexual Activity    Alcohol use: No    Drug use: No    Sexual activity: Not Currently     Partners: Male     Birth control/protection: Partner-Vasectomy     Review of Systems   Constitutional:  Negative for chills, fatigue and fever.   HENT:  Negative for congestion and trouble swallowing.    Respiratory:  Positive for shortness of breath. Negative for cough.    Cardiovascular:  Positive for chest pain (mid-sternal). Negative for leg swelling.   Gastrointestinal:  Positive for abdominal pain (epigastric, RUQ). Negative for diarrhea, nausea and vomiting.   Genitourinary:  Negative for decreased urine volume, difficulty urinating and dysuria.   Musculoskeletal:  Negative for arthralgias and myalgias.   Neurological:  Negative for dizziness, weakness and headaches.   Objective:     Vital Signs (Most Recent):  Temp: 97.9 °F (36.6 °C) (22 1300)  Pulse: 84 (22 1300)  Resp: 16 (22 1300)  BP: (!) 153/64 (22 1300)  SpO2: 100 % (22 1300)   Vital Signs (24h Range):  Temp:  [97.6 °F (36.4 °C)-97.9 °F (36.6 °C)] 97.9 °F (36.6 °C)  Pulse:  [67-84] 84  Resp:  [16-18] 16  SpO2:  [98 %-100 %] 100 %  BP: (152-153)/(64-66) 153/64     Weight: 73.9 kg (163 lb)  Body mass index is 27.98 kg/m².    Physical Exam  Vitals and nursing note reviewed.    Constitutional:       General: She is not in acute distress.     Appearance: She is well-developed.      Comments: PICC in place in right upper extremity, dried blood near site   HENT:      Head: Normocephalic and atraumatic.      Mouth/Throat:      Pharynx: No oropharyngeal exudate.   Eyes:      Conjunctiva/sclera: Conjunctivae normal.      Pupils: Pupils are equal, round, and reactive to light.   Cardiovascular:      Rate and Rhythm: Normal rate and regular rhythm.      Heart sounds: Normal heart sounds.   Pulmonary:      Effort: Pulmonary effort is normal. No respiratory distress.      Breath sounds: Normal breath sounds. Decreased air movement present.      Comments: Mild crackles in RLL  Abdominal:      General: Bowel sounds are normal. There is no distension.      Palpations: Abdomen is soft.      Tenderness: There is no abdominal tenderness.   Musculoskeletal:         General: No tenderness. Normal range of motion.      Cervical back: Normal range of motion and neck supple.      Right lower leg: Edema (2+) present.      Left lower leg: Edema (1+) present.   Skin:     General: Skin is warm and dry.      Capillary Refill: Capillary refill takes less than 2 seconds.      Findings: No rash.   Neurological:      Mental Status: She is alert and oriented to person, place, and time.      Cranial Nerves: No cranial nerve deficit.   Psychiatric:         Behavior: Behavior normal.         Thought Content: Thought content normal.         Judgment: Judgment normal.         CRANIAL NERVES     CN III, IV, VI   Pupils are equal, round, and reactive to light.     Significant Labs: All pertinent labs within the past 24 hours have been reviewed.  BMP:   Recent Labs   Lab 05/12/22  0832         K 3.7      CO2 26   BUN 21   CREATININE 1.7*   CALCIUM 10.2     CBC:   Recent Labs   Lab 05/12/22  0832   WBC 9.20   HGB 11.3*   HCT 34.8*        Cardiac Markers:   Recent Labs   Lab 05/12/22  0832   *        Significant Imaging: I have reviewed all pertinent imaging results/findings within the past 24 hours.

## 2022-05-12 NOTE — ASSESSMENT & PLAN NOTE
Patient with Paroxysmal (<7 days) atrial fibrillation which is controlled currently with Beta Blocker. Patient is currently in sinus rhythm.[id number] Score: 3. Anticoagulation indicated. Anticoagulation done with eliquis 5 mg daily.

## 2022-05-12 NOTE — PROGRESS NOTES
[facility] Dept  [facility] Medicine  Progress Note    Patient Name: Misa Barajas  MRN: [id number]  Patient Class: OP- Observation   Admission Date: 5/12/2022  Length of Stay: 0 days  Attending Physician: Faisal Nixon MD  Primary Care Provider: Celia Carlisle MD        Subjective:     Principal Problem:Epigastric abdominal pain        HPI:  Misa Barajas is a 76 y.o. with a past medical history of COPD, HFpEF (60% on 5/4/22), pAF (eliquis), HTN, HLD, and candida bacteremia (s/p PICC on micqafungin, end date 5/14) presenting with acute epigastric pain. Ms. Barajas's two daughters present at the bedside and provide additional history. Patient reports that while she was getting dressed this morning she experienced mid-sternal/epigastric pain that eventually radiated to her RUQ and right shoulder. Reports associated SOB with this episode. Patient also reports increased lower extremity swelling over the past few days which is improving after taking oral lasix. Of note, patient was admitted to the hospital about 2 weeks ago due to an obstructing R kidney stone s/p stent, pyelonephritis, and candida glabrata bacteremia. She was discharged on 5/6 with a PICC to administer micafungin for bactermia and oral levofloxacin for pyelonephritis per ID recs. Patient denies dizziness, weakness, nausea, vomiting, diaphoresis, palpitations, diarrhea, constipation, and abdominal pain.      In ED: hypertensive with SBP max of 153. , baseline near 180. Troponin .030 --> 0.29. Cr 1.7, baseline near 1.4. CBC unremarkable. RUQ US without evidence of cholecystitis but 3 mm adherent gallstone present. RUE US negative for DVT. Received 40 mg IV lasix.       Overview/Hospital Course:  No notes on file    Past Medical History:   Diagnosis Date    Acute hypoxemic respiratory failure 6/26/2021    Acute superficial venous thrombosis of lower extremity, bilateral 1/25/2022    Aortic atherosclerosis     Arthritis      knee joint pain    Breast cancer 2002    left breast & lymph nodes-s/p sx with chemo    Bronchitis     with flu-2/2014    Cataracts, bilateral     Chronic anticoagulation 5/4/2022    Chronic diastolic heart failure 12/24/2019    Chronic kidney disease, stage 3     History of chemotherapy     last treatment 12/2002 (had 8 treatments)    Hyperlipidemia 11/20/2016    Hypertension     Lymphedema of both lower extremities 1/25/2022    Nephrolithiasis 6/2/2014    Osteoporosis     Paroxysmal atrial fibrillation 6/7/2021    Renal calculi     hematuria    Renal osteodystrophy 1/14/2016    Venous stasis dermatitis of both lower extremities 1/25/2022    Vitamin D insufficiency        Past Surgical History:   Procedure Laterality Date    BREAST BIOPSY Left 2002    core bx, +    BREAST BIOPSY Right 2018    core    BREAST BIOPSY Right 2019    BREAST LUMPECTOMY Left 2002    CYSTOSCOPY  4/28/2022    Procedure: CYSTOSCOPY;  Surgeon: Vik Ware MD;  Location: [facility] OR [address] [place] [address];  Service: Urology;;    LITHOTRIPSY      MASTECTOMY Left 06/2002    left-& lymph node dissection    RETROGRADE PYELOGRAPHY Right 4/28/2022    Procedure: PYELOGRAM, RETROGRADE;  Surgeon: Vik Ware MD;  Location: [facility] OR [address] [place] [address];  Service: Urology;  Laterality: Right;    ROBOT-ASSISTED LAPAROSCOPIC PARTIAL NEPHRECTOMY USING DA JESÚS XI Right 3/11/2021    Procedure: XI ROBOTIC NEPHRECTOMY, PARTIAL;  Surgeon: Taz Ortega MD;  Location: [facility] OR [address] [place] [address];  Service: Urology;  Laterality: Right;  4hr/ gen with regional  [place] confirmation:  [phone] for 11:15am case [place]    ureteroscopy Bilateral     6.14       Review of patient's allergies indicates:   Allergen Reactions    Adhesive Rash     Pt states she removed a LATEX bandaid and the skin beneath was swollen and red. No other latex causes a reaction.       No current facility-administered medications on file prior to encounter.     Current Outpatient Medications  on File Prior to Encounter   Medication Sig    acetaminophen (TYLENOL) 500 MG tablet Take 2 tablets (1,000 mg total) by mouth every 8 (eight) hours as needed for Pain.    apixaban (ELIQUIS) 5 mg Tab Take 5 mg by mouth once daily.    aspirin (ECOTRIN) 81 MG EC tablet Take 81 mg by mouth once daily.    biotin 1 mg tablet Take 1,000 mcg by mouth once daily.    budesonide-glycopyr-formoterol (BREZTRI AEROSPHERE) 160-9-4.8 mcg/actuation HFAA Inhale 2 puffs into the lungs 2 (two) times daily.    fish oil-omega-3 fatty acids 300-1,000 mg capsule Take 1 capsule by mouth once daily.    fluticasone propionate (FLONASE) 50 mcg/actuation nasal spray 1 spray by Each Nostril route daily as needed (congestion).    furosemide (LASIX) 20 MG tablet Take 1 tablet by mouth daily If weight gain 2 to3 lbs for 2 to 5 days. If no improvement, call MD (Patient taking differently: Take 1 tablet by mouth daily If weight gain 2 to3 lbs for 2 to 5 days. If no improvement, call MD)    losartan (COZAAR) 25 MG tablet Take 1 tablet (25 mg total) by mouth once daily.    metoprolol succinate (TOPROL-XL) 100 MG 24 hr tablet Take 1 tablet (100 mg total) by mouth once daily.    micafungin sodium (MICAFUNGIN 100 MG/100 ML NS, READY TO MIX SYSTEM,) Inject 100 mLs (100 mg total) into the vein once daily. End date 5/14/2022. for 9 days    ondansetron (ZOFRAN) 4 MG tablet Take 1 tablet (4 mg total) by mouth every 8 (eight) hours as needed for Nausea.    rosuvastatin (CRESTOR) 5 MG tablet Take 1 tablet (5 mg total) by mouth once daily.    vitamin D (VITAMIN D3) 1000 units Tab TAKE 2 TABLETS ONE TIME DAILY    inhalation spacing device Use as directed for inhalation.    [DISCONTINUED] albuterol (ACCUNEB) 0.63 mg/3 mL Nebu Take 3 mLs (0.63 mg total) by nebulization every 6 (six) hours as needed. Rescue    [DISCONTINUED] levoFLOXacin (LEVAQUIN) 750 MG tablet Take 1 tablet (750 mg total) by mouth once daily. End date 5/11/2022. for 6 days      Family History       Problem Relation (Age of Onset)    Arthritis Mother, Sister    Bone cancer Mother    Breast cancer Mother, Sister    Cancer Father    Crohn's disease Daughter    Fibroids Daughter    No Known Problems Brother, Daughter          Tobacco Use    Smoking status: Former Smoker     Packs/day: 1.00     Years: 50.00     Pack years: 50.00     Types: Cigarettes     Start date:      Quit date: 2009     Years since quittin.9    Smokeless tobacco: Never Used   Substance and Sexual Activity    Alcohol use: No    Drug use: No    Sexual activity: Not Currently     Partners: Male     Birth control/protection: Partner-Vasectomy     Review of Systems   Constitutional:  Negative for chills, fatigue and fever.   HENT:  Negative for congestion and trouble swallowing.    Respiratory:  Positive for shortness of breath. Negative for cough.    Cardiovascular:  Positive for chest pain (mid-sternal). Negative for leg swelling.   Gastrointestinal:  Positive for abdominal pain (epigastric, RUQ). Negative for diarrhea, nausea and vomiting.   Genitourinary:  Negative for decreased urine volume, difficulty urinating and dysuria.   Musculoskeletal:  Negative for arthralgias and myalgias.   Neurological:  Negative for dizziness, weakness and headaches.   Objective:     Vital Signs (Most Recent):  Temp: 97.9 °F (36.6 °C) (22 1300)  Pulse: 84 (22 1300)  Resp: 16 (22 1300)  BP: (!) 153/64 (22 1300)  SpO2: 100 % (22 1300)   Vital Signs (24h Range):  Temp:  [97.6 °F (36.4 °C)-97.9 °F (36.6 °C)] 97.9 °F (36.6 °C)  Pulse:  [67-84] 84  Resp:  [16-18] 16  SpO2:  [98 %-100 %] 100 %  BP: (152-153)/(64-66) 153/64     Weight: 73.9 kg (163 lb)  Body mass index is 27.98 kg/m².    Physical Exam  Vitals and nursing note reviewed.   Constitutional:       General: She is not in acute distress.     Appearance: She is well-developed.      Comments: PICC in place in right upper extremity, dried  blood near site   HENT:      Head: Normocephalic and atraumatic.      Mouth/Throat:      Pharynx: No oropharyngeal exudate.   Eyes:      Conjunctiva/sclera: Conjunctivae normal.      Pupils: Pupils are equal, round, and reactive to light.   Cardiovascular:      Rate and Rhythm: Normal rate and regular rhythm.      Heart sounds: Normal heart sounds.   Pulmonary:      Effort: Pulmonary effort is normal. No respiratory distress.      Breath sounds: Normal breath sounds. Decreased air movement present.      Comments: Mild crackles in RLL  Abdominal:      General: Bowel sounds are normal. There is no distension.      Palpations: Abdomen is soft.      Tenderness: There is no abdominal tenderness.   Musculoskeletal:         General: No tenderness. Normal range of motion.      Cervical back: Normal range of motion and neck supple.      Right lower leg: Edema (2+) present.      Left lower leg: Edema (1+) present.   Skin:     General: Skin is warm and dry.      Capillary Refill: Capillary refill takes less than 2 seconds.      Findings: No rash.   Neurological:      Mental Status: She is alert and oriented to person, place, and time.      Cranial Nerves: No cranial nerve deficit.   Psychiatric:         Behavior: Behavior normal.         Thought Content: Thought content normal.         Judgment: Judgment normal.         CRANIAL NERVES     CN III, IV, VI   Pupils are equal, round, and reactive to light.     Significant Labs: All pertinent labs within the past 24 hours have been reviewed.  BMP:   Recent Labs   Lab 05/12/22  0832         K 3.7      CO2 26   BUN 21   CREATININE 1.7*   CALCIUM 10.2     CBC:   Recent Labs   Lab 05/12/22  0832   WBC 9.20   HGB 11.3*   HCT 34.8*        Cardiac Markers:   Recent Labs   Lab 05/12/22  0832   *       Significant Imaging: I have reviewed all pertinent imaging results/findings within the past 24 hours.      Assessment/Plan:      * Epigastric abdominal  pain  Patient reports episode of epigastric/mid-sternal CP onset acutely this morning while getting dressed. Denies abdominal pain, CP, SOB at present  - Troponin 0.03 --> 0.029, will repeat x1  - RUQ US without cholecystitis   - EKG without ST elevation or depression   - Nuclear stress echo ordered for the am, NPO at midnight  - Consider cardiology consult if elevated repeat troponin or positive stress test      Acute on chronic diastolic heart failure  - Interval history and physical exam findings as described above  - EF 60% per most recent echo on 5/4/22  - Clinically mildly volume overloaded on admission - mild crackles and trace LLE  - CXR with coasrened interstitial lung markings but no consolation   - , basline ~180  - Reports compliance with home medications   - S/p IV 40 mg lasix in the ED  - Will hold off on further diuresis for now  - Continue home cardioprudent regimen  - Nuclear stress ordered  - Will continue to monitor on tele    Essential hypertension  - BP currently well-controlled  - Continue home regimen of losartan 25 mg daily  - Will continue to monitor and further titrate antihypertensives as clinically indicated     CKD (chronic kidney disease) stage 3, GFR 30-59 ml/min  - Cr 1.7 at admission, baseline ~1.4  - Renally dosing all medications to Estimated Creatinine Clearance: 26.9 mL/min (A) (based on SCr of 1.7 mg/dL (H)).   - Avoid nephrotoxins  - Will continue to monitor on daily labs    Candidemia  - continue micafungin daily until last dose on 5/14/22      Hyperlipidemia  - continue statin  - resume fish oil at d/c    Lab Results   Component Value Date    LDLCALC 49.4 (L) 06/14/2021           Paroxysmal atrial fibrillation  Patient with Paroxysmal (<7 days) atrial fibrillation which is controlled currently with Beta Blocker. Patient is currently in sinus rhythm.[id number] Score: 3. Anticoagulation indicated. Anticoagulation done with eliquis 5 mg daily.        Obesity (BMI  30.0-34.9)  - Body mass index is 27.98 kg/m².  - Patient counseled on dietary and lifestyle modifications in relation to health      VTE Risk Mitigation (From admission, onward)         Ordered     apixaban tablet 5 mg  Daily         05/12/22 1425     Reason for No Pharmacological VTE Prophylaxis  Once        Question:  Reasons:  Answer:  Already adequately anticoagulated on oral Anticoagulants    05/12/22 1211     IP VTE HIGH RISK PATIENT  Once         05/12/22 1049     Place sequential compression device  Until discontinued         05/12/22 1049                Discharge Planning   NOHEMI:      Code Status: Full Code   Is the patient medically ready for discharge?:     Reason for patient still in hospital (select all that apply): Patient trending condition and Imaging           Discussed patient's plan of care with Dr. Tiffani Salazar PA-C  Department of Hospital Medicine   [facility] - Emergency Dept

## 2022-05-12 NOTE — ASSESSMENT & PLAN NOTE
- Interval history and physical exam findings as described above  - EF 60% per most recent echo on 5/4/22  - Clinically mildly volume overloaded on admission - mild crackles and trace LLE  - CXR with coasrened interstitial lung markings but no consolation   - , basline ~180  - Reports compliance with home medications   - S/p IV 40 mg lasix in the ED  - Will hold off on further diuresis for now  - Continue home cardioprudent regimen  - Nuclear stress ordered  - Will continue to monitor on tele

## 2022-05-12 NOTE — ED NOTES
Pt and family informed that they could not eat, pt was observed eating post education about NPO status

## 2022-05-12 NOTE — PROGRESS NOTES
Health Maintenance Due   Topic Date Due    DEXA Scan  08/30/2021    Mammogram  09/15/2021    COVID-19 Vaccine (4 - Booster for Pfizer series) 04/10/2022     Updates were requested from care everywhere.  Chart was reviewed for overdue Proactive [facility] Encounters ([facility]) topics (CRS, Breast Cancer Screening, Eye exam)  Health Maintenance has been updated.  LINKS immunization registry triggered.  Immunizations were reconciled.

## 2022-05-12 NOTE — ASSESSMENT & PLAN NOTE
- Body mass index is 27.98 kg/m².  - Patient counseled on dietary and lifestyle modifications in relation to health

## 2022-05-12 NOTE — ASSESSMENT & PLAN NOTE
- Cr 1.7 at admission, baseline ~1.4  - Renally dosing all medications to Estimated Creatinine Clearance: 26.9 mL/min (A) (based on SCr of 1.7 mg/dL (H)).   - Avoid nephrotoxins  - Will continue to monitor on daily labs

## 2022-05-12 NOTE — HPI
Misa Barajas is a 76 y.o. with a past medical history of COPD, HFpEF (60% on 5/4/22), pAF (eliquis), HTN, HLD, and candida bacteremia (s/p PICC on micqafungin, end date 5/14) presenting with acute epigastric pain. Ms. Barajas's two daughters present at the bedside and provide additional history. Patient reports that while she was getting dressed this morning she experienced mid-sternal/epigastric pain that eventually radiated to her RUQ and right shoulder. Reports associated SOB with this episode. Patient also reports increased lower extremity swelling over the past few days which is improving after taking oral lasix. Of note, patient was admitted to the hospital about 2 weeks ago due to an obstructing R kidney stone s/p stent, pyelonephritis, and candida glabrata bacteremia. She was discharged on 5/6 with a PICC to administer micafungin for bactermia and oral levofloxacin for pyelonephritis per ID recs. Patient denies dizziness, weakness, nausea, vomiting, diaphoresis, palpitations, diarrhea, constipation, and abdominal pain.      In ED: hypertensive with SBP max of 153. , baseline near 180. Troponin .030 --> 0.29. Cr 1.7, baseline near 1.4. CBC unremarkable. RUQ US without evidence of cholecystitis but 3 mm adherent gallstone present. RUE US negative for DVT. Received 40 mg IV lasix.

## 2022-05-12 NOTE — ED PROVIDER NOTES
Patient reports that Encounter Date: 5/12/2022       History     Chief Complaint   Patient presents with    Chest Pain     76-year-old female with past medical history of right upper extremity PICC line for fungemia, CHF, hypertension, paroxysmal AFib, COPD, CKD 3, paroxysmal AFib, former 50 pack year smoker,bilateral lower extremity lymphedema, presenting with several hours of epigastric pain radiating to right shoulder. Patient reports that at 6:30 AM she experienced a sharp epigastric/lower chest pain that radiated towards her right upper quadrant, accompanied by a sharp pain in her right anterior shoulder region.  Associated shortness of breath when pain occurred.  Denies diaphoresis, nausea, diarrhea, cough. No exacerbating factors.  Family notes bleeding around PICC line insertion site, and they are concerned that it may have become dislodged.  Patient was given 1 sublingual nitro and  in the ambulance, which reduced her pain to a 1. Family notes that she appear dehydrated so they gave her increased p.o. fluids, and then noticed her legs swell 2 days ago.            Review of patient's allergies indicates:   Allergen Reactions    Adhesive Rash     Pt states she removed a LATEX bandaid and the skin beneath was swollen and red. No other latex causes a reaction.     Past Medical History:   Diagnosis Date    Acute hypoxemic respiratory failure 6/26/2021    Acute superficial venous thrombosis of lower extremity, bilateral 1/25/2022    Aortic atherosclerosis     Arthritis     knee joint pain    Breast cancer 2002    left breast & lymph nodes-s/p sx with chemo    Bronchitis     with flu-2/2014    Cataracts, bilateral     Chronic anticoagulation 5/4/2022    Chronic diastolic heart failure 12/24/2019    Chronic kidney disease, stage 3     History of chemotherapy     last treatment 12/2002 (had 8 treatments)    Hyperlipidemia 11/20/2016    Hypertension     Lymphedema of both lower extremities  2022    Nephrolithiasis 2014    Osteoporosis     Paroxysmal atrial fibrillation 2021    Renal calculi     hematuria    Renal osteodystrophy 2016    Venous stasis dermatitis of both lower extremities 2022    Vitamin D insufficiency      Past Surgical History:   Procedure Laterality Date    BREAST BIOPSY Left     core bx, +    BREAST BIOPSY Right 2018    core    BREAST BIOPSY Right 2019    BREAST LUMPECTOMY Left     CYSTOSCOPY  2022    Procedure: CYSTOSCOPY;  Surgeon: Vik Ware MD;  Location: [facility] OR [address] [place] [address];  Service: Urology;;    LITHOTRIPSY      MASTECTOMY Left 2002    left-& lymph node dissection    RETROGRADE PYELOGRAPHY Right 2022    Procedure: PYELOGRAM, RETROGRADE;  Surgeon: Vik Ware MD;  [facility];  Service: Urology;  Laterality: Right;    ROBOT-ASSISTED LAPAROSCOPIC PARTIAL NEPHRECTOMY USING DA JESÚS XI Right 3/11/2021    Procedure: XI ROBOTIC NEPHRECTOMY, PARTIAL;  Surgeon: Taz Ortega MD;  Location: [facility] OR 2ND FLR;  Service: Urology;  Laterality: Right;  4hr/ gen with regional  [place] confirmation:  [id number] for 11:15am case [place]    ureteroscopy Bilateral     6.14     Family History   Problem Relation Age of Onset    Bone cancer Mother     Breast cancer Mother     Arthritis Mother         Breast Cancer    Breast cancer Sister     Cancer Father         Asbestos cancer    No Known Problems Brother     Fibroids Daughter     Crohn's disease Daughter     No Known Problems Daughter     Arthritis Sister         Breast Cancer    Anesthesia problems Neg Hx     Glaucoma Neg Hx      Social History     Tobacco Use    Smoking status: Former Smoker     Packs/day: 1.00     Years: 50.00     Pack years: 50.00     Types: Cigarettes     Start date:      Quit date: 2009     Years since quittin.0    Smokeless tobacco: Never Used   Substance Use Topics    Alcohol use: No    Drug use: No      Review of Systems   Constitutional: Negative for chills, diaphoresis, fatigue and fever.   HENT: Negative for congestion, rhinorrhea and sore throat.    Respiratory: Positive for shortness of breath. Negative for cough and chest tightness.    Cardiovascular: Positive for chest pain and leg swelling. Negative for palpitations.   Gastrointestinal: Negative for diarrhea, nausea and vomiting.   Genitourinary: Negative for dysuria, flank pain, pelvic pain and urgency.   Musculoskeletal: Positive for arthralgias (Right shoulder). Negative for myalgias, neck pain and neck stiffness.   Neurological: Negative for dizziness, weakness, light-headedness and numbness.   Hematological: Does not bruise/bleed easily.       Physical Exam     Initial Vitals [05/12/22 0815]   BP Pulse Resp Temp SpO2   (!) 152/66 68 18 97.6 °F (36.4 °C) 98 %      MAP       --         Physical Exam    Nursing note and vitals reviewed.  Constitutional: She appears well-developed and well-nourished. She is not diaphoretic. No distress.   HENT:   Head: Normocephalic and atraumatic.   Nose: Nose normal.   Eyes: EOM are normal. Pupils are equal, round, and reactive to light. No scleral icterus.   Neck: Neck supple.   Normal range of motion.  Cardiovascular: Normal rate, regular rhythm and intact distal pulses.   Pulmonary/Chest: Breath sounds normal. No respiratory distress. She has no wheezes. She has no rhonchi. She has no rales. She exhibits no tenderness.   Abdominal: Abdomen is soft. Bowel sounds are normal. She exhibits no distension. There is no abdominal tenderness. There is no rebound.   Musculoskeletal:         General: Edema (Bilateral lower extremity pitting) present. No tenderness. Normal range of motion.      Cervical back: Normal range of motion and neck supple.     Neurological: She is alert and oriented to person, place, and time. She has normal strength.   Skin: Skin is warm and dry. Capillary refill takes less than 2 seconds. No rash  noted. No erythema. No pallor.   Bleeding around PICC site, but no tenderness or erythema   Psychiatric: She has a normal mood and affect. Her behavior is normal. Judgment and thought content normal.         ED Course   Procedures  Labs Reviewed   CBC W/ AUTO DIFFERENTIAL - Abnormal; Notable for the following components:       Result Value    RBC 3.79 (*)     Hemoglobin 11.3 (*)     Hematocrit 34.8 (*)     Immature Grans (Abs) 0.05 (*)     All other components within normal limits   COMPREHENSIVE METABOLIC PANEL - Abnormal; Notable for the following components:    Creatinine 1.7 (*)     Albumin 2.7 (*)     eGFR if  32.8 (*)     eGFR if non  28.5 (*)     All other components within normal limits    Narrative:     Add on Lipase per Dr. Abad @ 09:07 am  to order # [id number]    TROPONIN I - Abnormal; Notable for the following components:    Troponin I 0.030 (*)     All other components within normal limits    Narrative:     Add on Lipase per Dr. Abad @ 09:07 am  to order # [id number]    B-TYPE NATRIURETIC PEPTIDE - Abnormal; Notable for the following components:     (*)     All other components within normal limits    Narrative:     Add on Lipase per Dr. Abad @ 09:07 am  to order # [id number]    TROPONIN I - Abnormal; Notable for the following components:    Troponin I 0.029 (*)     All other components within normal limits    Narrative:     Collection has been rescheduled by [facility] at 05/12/2022 11:14 Reason:   due at 11:24   POCT GLUCOSE, HAND-HELD DEVICE - Normal   LIPASE   LIPASE    Narrative:     Add on Lipase per Dr. Abad @ 09:07 am  to order # [id number]           Imaging Results          US Abdomen Limited (Gallbladder) (Final result)  Result time 05/12/22 13:36:09    Final result by Eben Hutson MD (05/12/22 13:36:09)                 Impression:      Nonspecific mild gallbladder wall thickening, which can be seen with chronic cholecystitis or underlying  recess hepatic dysfunction.  Small (3 mm) adherent gallstone noted.  Negative sonographic Hines sign.  Nuclear medicine HIDA scan could be performed for further evaluation, if indicated.    Mild right-sided hydronephrosis.  Ureteral stent noted.    Electronically signed by resident: Kyler Sims  Date:    05/12/2022  Time:    13:05    Electronically signed by: Eben Hutson MD  Date:    05/12/2022  Time:    13:36             Narrative:    EXAMINATION:  US ABDOMEN LIMITED    CLINICAL HISTORY:  Right upper quadrant pain    TECHNIQUE:  Abdominal ultrasound was performed.    COMPARISON:  Ultrasound retroperitoneal complete 05/01/2022, CT renal stone study abdomen pelvis without contrast 04/28/2022    FINDINGS:  There is no intra or extrahepatic bile duct dilatation.  The common duct measures 2 mm proximally and 6-7 mm distally.  There are scattered linear echogenic foci along the distal common bile duct, probable artifact from surrounding bowel air.    The gallbladder contains a non mobile echogenic focus measuring approximately 3 mm, which may represent an adherent stone or gallbladder polyp.  Mild prominence of the gallbladder wall measuring 4 mm.  Negative sonographic Hines's sign.    Visualized portions of the pancreas are unremarkable.    There is no free fluid within the visualized abdomen.    Partial visualization of the right kidney demonstrates mild hydronephrosis with ureteral stent.  Additionally, there is a 4 mm echogenic focus, favoring a renal stone.    The bladder is unremarkable.  Right and left ureteral jets are visualized.                               US Upper Extremity Veins Right (Final result)  Result time 05/12/22 13:12:53    Final result by Eben Hutson MD (05/12/22 13:12:53)                 Impression:      No thrombus in central veins of the right upper extremity.    Electronically signed by resident: Kyler Sims  Date:    05/12/2022  Time:    12:17    Electronically signed by: Eben Hutson  MD  Date:    05/12/2022  Time:    13:12             Narrative:    EXAMINATION:  US UPPER EXTREMITY VEINS RIGHT    CLINICAL HISTORY:  RUE pain, PICC;    TECHNIQUE:  Duplex and color flow Doppler evaluation and dynamic compression was performed of the right upper extremity veins.    COMPARISON:  Ultrasound upper extremity veins right 05/06/2022    FINDINGS:  Venous catheter is visualized in the right subclavian vein, right axillary vein, and right basilic vein, limiting evaluation.    Central veins: The internal jugular, subclavian, and axillary veins are patent and free of thrombus.    Arm veins: The brachial, basilic, and cephalic veins are patent and compressible.    Contralateral subclavian/internal jugular veins: The left subclavian and internal jugular veins are patent and free of thrombus.    Other findings: None.                               X-Ray Shoulder Complete 2 View Right (Final result)  Result time 05/12/22 09:44:48    Final result by Danielito Pereira MD (05/12/22 09:44:48)                 Impression:      Right-sided PICC overlies the SVC.  No displaced fracture.      Electronically signed by: Danielito Pereira MD  Date:    05/12/2022  Time:    09:44             Narrative:    EXAMINATION:  XR SHOULDER COMPLETE 2 OR MORE VIEWS RIGHT    CLINICAL HISTORY:  Presence of other vascular implants and grafts    TECHNIQUE:  Two or three views of the right shoulder were performed.    COMPARISON:  None.    FINDINGS:  No evidence of acute fracture or dislocation.  Right-sided PICC overlies the SVC.  No unexpected radiopaque foreign body.                               X-Ray Chest AP Portable (Final result)  Result time 05/12/22 09:43:02    Final result by Danielito Pereira MD (05/12/22 09:43:02)                 Impression:      No detrimental change when compared with 05/05/2022.      Electronically signed by: Danielito Pereira MD  Date:    05/12/2022  Time:    09:43             Narrative:    EXAMINATION:  XR CHEST  "AP PORTABLE    CLINICAL HISTORY:  Provided history is "Chest Pain;  ".    TECHNIQUE:  One view of the chest.    COMPARISON:  05/05/2022.    FINDINGS:  Cardiac wires overlie the chest.  Cardiomediastinal silhouette is magnified by portable technique and is likely mildly enlarged.  Atherosclerotic calcifications overlie the aortic arch.  Right-sided PICC overlies the SVC.  There are coarsened interstitial lung markings but no large focal area of consolidation.  Possible trace pleural effusions.  No pneumothorax.  Aeration is similar to mildly improved when compared with 05/05/2020.                                 Medications   aspirin tablet 325 mg (325 mg Oral Not Given 5/12/22 0830)   furosemide injection 40 mg (40 mg Intravenous Given 5/12/22 1013)   regadenoson injection 0.4 mg (0.4 mg Intravenous Given 5/13/22 1014)     Medical Decision Making:   History:   Old Medical Records: I decided to obtain old medical records.  Old Records Summarized: records from clinic visits and records from previous admission(s).       <> Summary of Records: See HPI  Initial Assessment:   Patient is well appearing, vital signs stable, no tenderness palpation right upper quadrant or in upper extremity, pain-free at this time  Differential Diagnosis:   ACS, CHF exacerbation, PICC line complication, cholelithiasis, no tenderness to palpation in right upper quadrant concerning for cholecystitis  Independently Interpreted Test(s):   I have ordered and independently interpreted X-rays - see summary below.       <> Summary of X-Ray Reading(s): No sig pulm edema  I have ordered and independently interpreted EKG Reading(s) - see summary below       <> Summary of EKG Reading(s): Atrial flutter with variable AV block, bifascicular block, rate 70, no ischemic changes  Clinical Tests:   Lab Tests: Ordered and Reviewed  Radiological Study: Ordered and Reviewed  Medical Tests: Ordered and Reviewed  ED Management:  BNP mildly elevated, however MARQUEZ " also present. Will admit for high risk CP.   Other:   I have discussed this case with another health care provider.       <> Summary of the Discussion: Discussed with patient's infectious disease physician Dr. Winchester whom she was supposed to see today, she states that patient is due to finish antifungal medication on Saturday             ED Course as of 05/25/22 2241   u May 12, 2022   0939 Troponin I(!): 0.030  baseline [JR]   0939 BNP(!): 422  increased [JR]   0939 Lipase: 21 [JR]   0939 Creatinine(!): 1.7  MARQUEZ [JR]   0939 WBC: 9.20 [JR]      ED Course User Index  [JR] Candida Arthur MD             Clinical Impression:   Final diagnoses:  [Z95.828] Status post PICC central line placement  [R10.11] Right upper quadrant pain  [R07.9] Chest pain, unspecified type (Primary)  [I50.9] Acute on chronic congestive heart failure, unspecified heart failure type          ED Disposition Condition    Observation               Candida Arthur MD  05/30/22 8376

## 2022-05-12 NOTE — PHARMACY MED REC
"Admission Medication History     The home medication history was taken by Kelli Gauthier.    You may go to "Admission" then "Reconcile Home Medications" tabs to review and/or act upon these items.      The home medication list has been updated by the Pharmacy department.    Please read ALL comments highlighted in yellow.    Please address this information as you see fit.     Feel free to contact us if you have any questions or require assistance.      The medications listed below were removed from the home medication list. Please reorder if appropriate:  Patient reports no longer taking the following medication(s):   ALBUTEROL 0.63MG/3ML   LEVOFLOXACIN 750MG    Medications listed below were obtained from: Patient/family    Medication Sig    acetaminophen (TYLENOL) 500 MG tablet   Take 2 tablets (1,000 mg total) by mouth every 8 (eight) hours as needed for Pain.    apixaban (ELIQUIS) 5 mg Tab   Take 5 mg by mouth once daily.    aspirin (ECOTRIN) 81 MG EC tablet   Take 81 mg by mouth once daily.    biotin 1 mg tablet   Take 1,000 mcg by mouth once daily.    budesonide-glycopyr-formoterol (BREZTRI AEROSPHERE) 160-9-4.8 mcg/actuation HFAA   Inhale 2 puffs into the lungs 2 (two) times daily.    fish oil-omega-3 fatty acids 300-1,000 mg capsule   Take 1 capsule by mouth once daily.    fluticasone propionate (FLONASE) 50 mcg/actuation nasal spray   1 spray by Each Nostril route daily as needed (congestion).    furosemide (LASIX) 20 MG tablet       Take 1 tablet by mouth daily If weight gain 2 to3 lbs for 2 to 5 days. If no improvement, call MD (Patient taking differently: Take 1 tablet by mouth daily If weight gain 2 to3 lbs for 2 to 5 days. If no improvement, call MD)    losartan (COZAAR) 25 MG tablet   Take 1 tablet (25 mg total) by mouth once daily.    metoprolol succinate (TOPROL-XL) 100 MG 24 hr tablet   Take 1 tablet (100 mg total) by mouth once daily.    micafungin sodium (MICAFUNGIN 100 MG/100 ML " NS, READY TO MIX SYSTEM,)   Inject 100 mLs (100 mg total) into the vein once daily. End date 5/14/2022. for 9 days    ondansetron (ZOFRAN) 4 MG tablet   Take 1 tablet (4 mg total) by mouth every 8 (eight) hours as needed for Nausea.    rosuvastatin (CRESTOR) 5 MG tablet   Take 1 tablet (5 mg total) by mouth once daily.    vitamin D (VITAMIN D3) 1000 units Tab   TAKE 2 TABLETS ONE TIME DAILY    inhalation spacing device Use as directed for inhalation.           Kelli Gauthier  EXT [id number]                    .

## 2022-05-12 NOTE — ASSESSMENT & PLAN NOTE
- continue statin  - resume fish oil at d/c    Lab Results   Component Value Date    LDLCALC 49.4 (L) 06/14/2021

## 2022-05-13 ENCOUNTER — TELEPHONE (OUTPATIENT)
Dept: INFECTIOUS DISEASES | Facility: CLINIC | Age: 77
End: 2022-05-13
Payer: MEDICARE

## 2022-05-13 VITALS
DIASTOLIC BLOOD PRESSURE: 60 MMHG | SYSTOLIC BLOOD PRESSURE: 124 MMHG | OXYGEN SATURATION: 91 % | TEMPERATURE: 98 F | HEIGHT: 64 IN | WEIGHT: 163 LBS | RESPIRATION RATE: 17 BRPM | BODY MASS INDEX: 27.83 KG/M2 | HEART RATE: 68 BPM

## 2022-05-13 LAB
CV PHARM DOSE: 0.4 MG
CV STRESS BASE HR: 53 BPM
DIASTOLIC BLOOD PRESSURE: 59 MMHG
EJECTION FRACTION- HIGH: 65 %
END DIASTOLIC INDEX-HIGH: 153 ML/M2
END DIASTOLIC INDEX-LOW: 93 ML/M2
END SYSTOLIC INDEX-HIGH: 71 ML/M2
END SYSTOLIC INDEX-LOW: 31 ML/M2
OHS CV CPX 85 PERCENT MAX PREDICTED HEART RATE MALE: 117
OHS CV CPX MAX PREDICTED HEART RATE: 137
OHS CV CPX PATIENT IS FEMALE: 1
OHS CV CPX PATIENT IS MALE: 0
OHS CV CPX PEAK HEAR RATE: 63 BPM
OHS CV CPX PEAK RATE PRESSURE PRODUCT: 8883
OHS CV CPX PEAK SYSTOLIC BLOOD PRESSURE: 141 MMHG
OHS CV CPX PERCENT MAX PREDICTED HEART RATE ACHIEVED: 46
OHS CV CPX RATE PRESSURE PRODUCT PRESENTING: 7473
POCT GLUCOSE: 123 MG/DL (ref 70–110)
POCT GLUCOSE: 87 MG/DL (ref 70–110)
POCT GLUCOSE: 97 MG/DL (ref 70–110)
RETIRED EF AND QEF - SEE NOTES: 53 %
SYSTOLIC BLOOD PRESSURE: 141 MMHG

## 2022-05-13 PROCEDURE — 63600175 PHARM REV CODE 636 W HCPCS: Mod: JG

## 2022-05-13 PROCEDURE — 99217 PR OBSERVATION CARE DISCHARGE: ICD-10-PCS | Mod: ,,,

## 2022-05-13 PROCEDURE — 96366 THER/PROPH/DIAG IV INF ADDON: CPT

## 2022-05-13 PROCEDURE — 99217 PR OBSERVATION CARE DISCHARGE: CPT | Mod: ,,,

## 2022-05-13 PROCEDURE — G0378 HOSPITAL OBSERVATION PER HR: HCPCS

## 2022-05-13 PROCEDURE — 63600175 PHARM REV CODE 636 W HCPCS: Performed by: INTERNAL MEDICINE

## 2022-05-13 PROCEDURE — 25000003 PHARM REV CODE 250

## 2022-05-13 RX ORDER — REGADENOSON 0.08 MG/ML
0.4 INJECTION, SOLUTION INTRAVENOUS ONCE
Status: COMPLETED | OUTPATIENT
Start: 2022-05-13 | End: 2022-05-13

## 2022-05-13 RX ADMIN — MICAFUNGIN SODIUM 100 MG: 100 INJECTION, POWDER, LYOPHILIZED, FOR SOLUTION INTRAVENOUS at 11:05

## 2022-05-13 RX ADMIN — ATORVASTATIN CALCIUM 20 MG: 20 TABLET, FILM COATED ORAL at 11:05

## 2022-05-13 RX ADMIN — APIXABAN 5 MG: 5 TABLET, FILM COATED ORAL at 11:05

## 2022-05-13 RX ADMIN — ASPIRIN 81 MG: 81 TABLET, COATED ORAL at 11:05

## 2022-05-13 RX ADMIN — REGADENOSON 0.4 MG: 0.08 INJECTION, SOLUTION INTRAVENOUS at 10:05

## 2022-05-13 NOTE — PLAN OF CARE
Pt arrived to floor via transport x1 in hospital bed. Admitted to floor. All questions and concerns answered. No needs expressed at the time. Pt hypertensive with SBP >170. Reported to GINGER Remy, in report. All other VSS. Pt is awake, alert and oriented with no acute distress noted. Respirations even and unlabored.     Problem: Adult Inpatient Plan of Care  Goal: Plan of Care Review  Outcome: Ongoing, Progressing  Goal: Patient-Specific Goal (Individualized)  Outcome: Ongoing, Progressing  Goal: Absence of Hospital-Acquired Illness or Injury  Outcome: Ongoing, Progressing  Goal: Optimal Comfort and Wellbeing  Outcome: Ongoing, Progressing  Goal: Readiness for Transition of Care  Outcome: Ongoing, Progressing     Problem: Infection  Goal: Absence of Infection Signs and Symptoms  Outcome: Ongoing, Progressing

## 2022-05-13 NOTE — PLAN OF CARE
Problem: Adult Inpatient Plan of Care  Goal: Plan of Care Review  Outcome: Met  Goal: Patient-Specific Goal (Individualized)  Outcome: Met  Goal: Absence of Hospital-Acquired Illness or Injury  Outcome: Met  Goal: Optimal Comfort and Wellbeing  Outcome: Met  Goal: Readiness for Transition of Care  Outcome: Met     Problem: Infection  Goal: Absence of Infection Signs and Symptoms  Outcome: Met     Problem: Pain Acute  Goal: Acceptable Pain Control and Functional Ability  Outcome: Met     Pt sitting in recliner at present. AAOx4 and able to voice needs to staff. Denies pain/discomfort at present. Limb restriction armband applied to left wrist due to hx of left mastectomy. Cont red discoloration to urine. MD aware. Cont antifungal as ordered. No s/s of adverse reactions noted. Pt and dtrs verbalizes understanding of discharge instructions. Pt will travel home via private vehicle with dtrs.

## 2022-05-13 NOTE — HOSPITAL COURSE
Patient placed in observation for epigastric pain and CP rule-out. Troponin at baseline x3. EKG without ST changes. Patient denies any CP since admission. Nuclear stress echo without evidence of reversible ischemia, but with evidence of mild-to-moderate sized scar in the inferior wall. Patient stable for discharge with cardiology follow-up. Referral place. Return precautions given and patient verbalized understanding.

## 2022-05-13 NOTE — PLAN OF CARE
[facility] Tele[facility] Stepdown ([facility])  Initial Discharge Assessment       Primary Care Provider: Celia Carlisle MD    Admission Diagnosis: Right upper quadrant pain [R10.11]  Abnormal ventricular wall motion [R93.89]  Status post PICC central line placement [Z95.828]  Chest pain [R07.9]  Chest pain, unspecified type [R07.9]  Acute on chronic congestive heart failure, unspecified heart failure type [I50.9]    Admission Date: 5/12/2022  Expected Discharge Date: 5/13/2022         Payor: "[employer] MEDICARE / Plan: [employer] MEDICARE HMO / Product Type: Capitation /     Extended Emergency Contact Information  Primary Emergency Contact: Esther Barajas  Mobile Phone: [phone]  Relation: Daughter  Secondary Emergency Contact: Marina Gruber  Address: [address] [place] [address]  Home Phone: [phone]  Mobile Phone: [phone]  Relation: Daughter    Discharge Plan A: (P) Home  Discharge Plan B: (P) Home, Home Health      [facility] [address] [place] [address] [address] [place] [address]  Phone: [phone] Fax: [phone]    [facility] - [place], [place] - [address] [facility]  [address] [facility] [address]  Phone: [phone] Fax: [phone]    [facility]  [address] [facility] [address]  [facility] [address]  Phone: [phone] Fax: [phone]            completed Discharge Planning Assessment with patient's daughters, Esther and Marina via bedside. Discharge planning booklet given to patient/family and whiteboard updated with NOHEMI and phone #. All questions answered.    Marina reported that they will provide transportation for patient upon discharge.     Marina reported that patient lives at home alone and prior to hospitalization she was independent with her ADL's. Patient is not on dialysis and she does not go to a Coumadin clinic.     Marina reported that patient currently does not use any  DME; however, feels she would benefit from having a walker and a slide board for her bath tub.    Marina reported that patient was about to start with Outpatient PT/OT prior to her coming to the hospital. Marina also reported patient is working with [facility] and they came out to visit with her earlier this week.    Patient lives in a [facility] with 0 steps to enter.       Francy Lu LMSW  [facility]  Ext. [phone]

## 2022-05-13 NOTE — PROGRESS NOTES
Bandage at IV site re-wrapped to right hand after bleeding noted through bandage. Pressure held to IV removal site. Pt rick well. No c/o of dizziness, no pale skin noted. Pt denies pain/discomfort. Dtrs at bedside. Pt transported GARY via w/c transport home via private vehicle with dtrs. Belongings taken with pt.

## 2022-05-13 NOTE — H&P
[facility] [facility]  History & Physical    Patient Name: Misa Barajas  MRN: [id number]  Patient Class: OP- Observation  Admission Date: 5/12/2022  Attending Physician: Faisal Nixon MD   Primary Care Provider: Celia Carlisle MD         Patient information was obtained from patient, relative(s), past medical records and ER records.     Subjective:     Principal Problem:Epigastric abdominal pain    Chief Complaint:   Chief Complaint   Patient presents with    Chest Pain        HPI: Misa Barajas is a 76 y.o. with a past medical history of COPD, HFpEF (60% on 5/4/22), pAF (eliquis), HTN, HLD, and candida bacteremia (s/p PICC on micqafungin, end date 5/14) presenting with acute epigastric pain. Ms. Barajas's two daughters present at the bedside and provide additional history. Patient reports that while she was getting dressed this morning she experienced mid-sternal/epigastric pain that eventually radiated to her RUQ and right shoulder. Reports associated SOB with this episode. Patient also reports increased lower extremity swelling over the past few days which is improving after taking oral lasix. Of note, patient was admitted to the hospital about 2 weeks ago due to an obstructing R kidney stone s/p stent, pyelonephritis, and candida glabrata bacteremia. She was discharged on 5/6 with a PICC to administer micafungin for bactermia and oral levofloxacin for pyelonephritis per ID recs. Patient denies dizziness, weakness, nausea, vomiting, diaphoresis, palpitations, diarrhea, constipation, and abdominal pain.      In ED: hypertensive with SBP max of 153. , baseline near 180. Troponin .030 --> 0.29. Cr 1.7, baseline near 1.4. CBC unremarkable. RUQ US without evidence of cholecystitis but 3 mm adherent gallstone present. RUE US negative for DVT. Received 40 mg IV lasix.       No new subjective & objective note has been filed under this hospital service since the  last note was generated.    Assessment/Plan:     * Epigastric abdominal pain  Patient reports episode of epigastric/mid-sternal CP onset acutely this morning while getting dressed. Denies abdominal pain, CP, SOB at present  - Troponin 0.03 --> 0.029, will repeat x1  - RUQ US without cholecystitis   - EKG without ST elevation or depression   - Nuclear stress echo ordered for the am, NPO at midnight  - Consider cardiology consult if elevated repeat troponin or positive stress test      Acute on chronic diastolic heart failure  - Interval history and physical exam findings as described above  - EF 60% per most recent echo on 5/4/22  - Clinically mildly volume overloaded on admission - mild crackles and trace LLE  - CXR with coasrened interstitial lung markings but no consolation   - , basline ~180  - Reports compliance with home medications   - S/p IV 40 mg lasix in the ED  - Will hold off on further diuresis for now  - Continue home cardioprudent regimen  - Nuclear stress ordered  - Will continue to monitor on tele    Essential hypertension  - BP currently well-controlled  - Continue home regimen of losartan 25 mg daily  - Will continue to monitor and further titrate antihypertensives as clinically indicated     CKD (chronic kidney disease) stage 3, GFR 30-59 ml/min  - Cr 1.7 at admission, baseline ~1.4  - Renally dosing all medications to Estimated Creatinine Clearance: 26.9 mL/min (A) (based on SCr of 1.7 mg/dL (H)).   - Avoid nephrotoxins  - Will continue to monitor on daily labs    Candidemia  - continue micafungin daily until last dose on 5/14/22      Hyperlipidemia  - continue statin  - resume fish oil at d/c    Lab Results   Component Value Date    LDLCALC 49.4 (L) 06/14/2021           Paroxysmal atrial fibrillation  Patient with Paroxysmal (<7 days) atrial fibrillation which is controlled currently with Beta Blocker. Patient is currently in sinus rhythm.[id number] Score: 3. Anticoagulation indicated.  Anticoagulation done with eliquis 5 mg daily.        Obesity (BMI 30.0-34.9)  - Body mass index is 27.98 kg/m².  - Patient counseled on dietary and lifestyle modifications in relation to health      VTE Risk Mitigation (From admission, onward)         Ordered     apixaban tablet 5 mg  Daily         05/12/22 1425     Reason for No Pharmacological VTE Prophylaxis  Once        Question:  Reasons:  Answer:  Already adequately anticoagulated on oral Anticoagulants    05/12/22 1211     IP VTE HIGH RISK PATIENT  Once         05/12/22 1049     Place sequential compression device  Until discontinued         05/12/22 1049                   Deysi Salazar PA-C  Department of Hospital Medicine   [facility] - Tele[facility] Stepdown ([place]

## 2022-05-13 NOTE — TELEPHONE ENCOUNTER
Pt's daughter, Iqra Gruber, stop by the office today.  Stated pt missed her appointment yesterday due to a possible heart attack.   Pt is currently admitted at [facility] - Telemetry [facility].   Iqra would like to know if the IV micgugin will be stop on 5/14/22, and the line will be remove.  Please advice    [phone]  
60-75 yrs

## 2022-05-13 NOTE — PLAN OF CARE
Pt A&Ox4, VSS, no fever. Pt denies epigastric pain at this time. Pt on 96% on 2L nasal cannula, respirations even and unlabored. Pt on purewick with bloody urine. Pt states she has a stent and is on Lasix. Pt has LE edema +1 and bruising. Pt NPO at midnight for stress test in morning. Will continue to monitor.     Problem: Adult Inpatient Plan of Care  Goal: Plan of Care Review  Outcome: Ongoing, Progressing  Goal: Patient-Specific Goal (Individualized)  Outcome: Ongoing, Progressing  Goal: Absence of Hospital-Acquired Illness or Injury  Outcome: Ongoing, Progressing  Goal: Optimal Comfort and Wellbeing  Outcome: Ongoing, Progressing  Goal: Readiness for Transition of Care  Outcome: Ongoing, Progressing     Problem: Infection  Goal: Absence of Infection Signs and Symptoms  Outcome: Ongoing, Progressing

## 2022-05-14 ENCOUNTER — NURSE TRIAGE (OUTPATIENT)
Dept: ADMINISTRATIVE | Facility: CLINIC | Age: 77
End: 2022-05-14
Payer: MEDICARE

## 2022-05-14 LAB
BACTERIA BLD CULT: ABNORMAL

## 2022-05-14 NOTE — TELEPHONE ENCOUNTER
"Spoke with Esther she states patient was discharged yesterday.  Per Esther patient was taking micafungin sodium (MICAFUNGIN 100 MG/100 ML NS, READY TO MIX SYSTEM,) for a fungus infection.  Patient was to complete 9 days total. She was admitted in the hospital x 2 days related to chest pain.  Patient received 2 doses in the hospital to equal 9 total doses.  Daughter wants to clarify if she should give 2 remaining dosages that are at home.  Spoke with Freddie Phillips via secure chat whom states "Looks like they continued giving micafungin in the hospital. No need to give any more at home. Someone should arrange to have PICC removed this coming week. Infusion center should be aware."  Daughter states they are waiting on a call back about HH taking PICC line out next weeks.  Daughter verbalized understanding that medication does not need to be given and she has no further concerns/questions.     Reason for Disposition   [1] Caller has URGENT medicine question about med that PCP or specialist prescribed AND [2] triager unable to answer question    Protocols used: MEDICATION QUESTION CALL-A-AH      "

## 2022-05-15 NOTE — ASSESSMENT & PLAN NOTE
- Interval history and physical exam findings as described above  - EF 60% per most recent echo on 4/28  - Clinically mildly volume overloaded on admission - mild crackles and trace LLE  - CXR with coasrened interstitial lung markings but no consolation   - , basline ~180  - Reports compliance with home medications   - S/p IV 40 mg lasix in the ED  - Continue home cardioprudent regimen  - Nuclear stress ordered, see above    Results for orders placed during the hospital encounter of 04/28/22    Echo    Interpretation Summary  · The left ventricle is normal in size with normal systolic function.  · The estimated ejection fraction is 60%.  · There are segmental left ventricular wall motion abnormalities.  · There is abnormal septal wall motion.  · Indeterminate left ventricular diastolic function.  · Normal right ventricular size with normal right ventricular systolic function.  · Mild mitral regurgitation.  · Mild tricuspid regurgitation.  · Intermediate central venous pressure (8 mmHg).  · The estimated PA systolic pressure is 31 mmHg.

## 2022-05-15 NOTE — ASSESSMENT & PLAN NOTE
Patient with Paroxysmal (<7 days) atrial fibrillation which is controlled currently with Beta Blocker. Patient is currently in sinus rhythm.[id number] Score: 3. Anticoagulation indicated. Anticoagulation done with eliquis 5 mg daily.

## 2022-05-15 NOTE — ASSESSMENT & PLAN NOTE
- Cr 1.7 at admission, baseline ~1.4  - Renally dosing all medications to Estimated Creatinine Clearance: 26.9 mL/min (A) (based on SCr of 1.7 mg/dL (H)).   - Avoid nephrotoxins

## 2022-05-15 NOTE — DISCHARGE SUMMARY
[facility] [address]  [facility] Medicine  Discharge Summary      Patient Name: Misa Barajas  MRN: [id number]  Patient Class: OP- Observation  Admission Date: 5/12/2022  Hospital Length of Stay: 0 days  Discharge Date and Time: 5/13/2022  6:05 PM  Attending Physician: No att. providers found   Discharging Provider: Deysi Salazar PA-C  Primary Care Provider: Celia Carlisle MD  [facility] Medicine Team: [facility] MED F Deysi Salazar PA-C    HPI:   Misa Barajas is a 76 y.o. with a past medical history of COPD, HFpEF (60% on 5/4/22), pAF (eliquis), HTN, HLD, and candida bacteremia (s/p PICC on micqafungin, end date 5/14) presenting with acute epigastric pain. Ms. Barajas's two daughters present at the bedside and provide additional history. Patient reports that while she was getting dressed this morning she experienced mid-sternal/epigastric pain that eventually radiated to her RUQ and right shoulder. Reports associated SOB with this episode. Patient also reports increased lower extremity swelling over the past few days which is improving after taking oral lasix. Of note, patient was admitted to the hospital about 2 weeks ago due to an obstructing R kidney stone s/p stent, pyelonephritis, and candida glabrata bacteremia. She was discharged on 5/6 with a PICC to administer micafungin for bactermia and oral levofloxacin for pyelonephritis per ID recs. Patient denies dizziness, weakness, nausea, vomiting, diaphoresis, palpitations, diarrhea, constipation, and abdominal pain.      In ED: hypertensive with SBP max of 153. , baseline near 180. Troponin .030 --> 0.29. Cr 1.7, baseline near 1.4. CBC unremarkable. RUQ US without evidence of cholecystitis but 3 mm adherent gallstone present. RUE US negative for DVT. Received 40 mg IV lasix.       * No surgery found *      Hospital Course:   Patient placed in observation for epigastric pain and CP rule-out. Troponin at baseline x3. EKG  without ST changes. Patient denies any CP since admission. Nuclear stress echo without evidence of reversible ischemia, but with evidence of mild-to-moderate sized scar in the inferior wall. Patient stable for discharge with cardiology follow-up. Referral place. Return precautions given and patient verbalized understanding.        Goals of Care Treatment Preferences:  Code Status: Full Code      Consults:     * Epigastric abdominal pain  Patient reports episode of epigastric/mid-sternal CP onset acutely this morning while getting dressed. Denies abdominal pain, CP, SOB at present  - Troponin negative x3  - RUQ US without cholecystitis   - EKG without ST elevation or depression   - Nuclear stress echo ordered, without evidence of reversible ischemia  - outpatient cardiology referral placed for mild-to-moderate sized scar in the inferior wall    Acute on chronic diastolic heart failure  - Interval history and physical exam findings as described above  - EF 60% per most recent echo on 4/28  - Clinically mildly volume overloaded on admission - mild crackles and trace LLE  - CXR with coasrened interstitial lung markings but no consolation   - , basline ~180  - Reports compliance with home medications   - S/p IV 40 mg lasix in the ED  - Continue home cardioprudent regimen  - Nuclear stress ordered, see above    Results for orders placed during the hospital encounter of 04/28/22    Echo    Interpretation Summary  · The left ventricle is normal in size with normal systolic function.  · The estimated ejection fraction is 60%.  · There are segmental left ventricular wall motion abnormalities.  · There is abnormal septal wall motion.  · Indeterminate left ventricular diastolic function.  · Normal right ventricular size with normal right ventricular systolic function.  · Mild mitral regurgitation.  · Mild tricuspid regurgitation.  · Intermediate central venous pressure (8 mmHg).  · The estimated PA systolic pressure is  31 mmHg.      Essential hypertension  - BP currently well-controlled  - Continue home regimen of losartan 25 mg daily    CKD (chronic kidney disease) stage 3, GFR 30-59 ml/min  - Cr 1.7 at admission, baseline ~1.4  - Renally dosing all medications to Estimated Creatinine Clearance: 26.9 mL/min (A) (based on SCr of 1.7 mg/dL (H)).   - Avoid nephrotoxins    Candidemia  - continue micafungin daily until last dose on 5/14/22      Hyperlipidemia  - continue statin  - resume fish oil at d/c    Lab Results   Component Value Date    LDLCALC 49.4 (L) 06/14/2021           Paroxysmal atrial fibrillation  Patient with Paroxysmal (<7 days) atrial fibrillation which is controlled currently with Beta Blocker. Patient is currently in sinus rhythm.[id number] Score: 3. Anticoagulation indicated. Anticoagulation done with eliquis 5 mg daily.        Obesity (BMI 30.0-34.9)  - Body mass index is 27.98 kg/m².  - Patient counseled on dietary and lifestyle modifications in relation to health      Final Active Diagnoses:    Diagnosis Date Noted POA    PRINCIPAL PROBLEM:  Epigastric abdominal pain [R10.13] 05/12/2022 Yes    Acute on chronic diastolic heart failure [I50.33] 12/24/2019 Yes    Essential hypertension [I10] 05/28/2015 Yes    CKD (chronic kidney disease) stage 3, GFR 30-59 ml/min [N18.30] 02/14/2018 Yes    Candidemia [B37.7] 05/01/2022 Yes    Paroxysmal atrial fibrillation [I48.0] 06/07/2021 Yes    Obesity (BMI 30.0-34.9) [E66.9] 08/22/2019 Yes    Hyperlipidemia [E78.5] 11/20/2016 Yes      Problems Resolved During this Admission:       Discharged Condition: good    Disposition: Home or Self Care    Follow Up:   Follow-up Information     [facility] - Infectious Disease 1st Fl Follow up.    Specialty: Infectious Diseases  Why: Nurse will call to schedule follow up appointment; however, if you do not hear from someone within 48 hours contact the number listed.  Contact information:  [address]  [facility]  [address]  [phone]  Additional information:  Main Building, 1st floor near [place] entrance   Please park in [facility]                     Patient Instructions:      Ambulatory referral/consult to Cardiology   Standing Status: Future   Referral Priority: Urgent Referral Type: Consultation   Referral Reason: Specialty Services Required   Referred to Provider: ADAN WU Requested Specialty: Cardiology   Number of Visits Requested: 1     Ambulatory referral/consult to Infectious Disease   Standing Status: Future   Referral Priority: Urgent Referral Type: Consultation   Referral Reason: Specialty Services Required   Requested Specialty: Infectious Diseases   Number of Visits Requested: 1     Ambulatory referral/consult to [facility]   Standing Status: Future   Referral Priority: Routine Referral Type: Consultation   Referral Reason: Specialty Services Required   Number of Visits Requested: 1     Diet Cardiac     Notify your health care provider if you experience any of the following:  persistent nausea and vomiting or diarrhea     Notify your health care provider if you experience any of the following:  severe uncontrolled pain     Notify your health care provider if you experience any of the following:  difficulty breathing or increased cough     Notify your health care provider if you experience any of the following:  persistent dizziness, light-headedness, or visual disturbances     Activity as tolerated       Significant Diagnostic Studies: Cardiac Graphics: Stress Test: Nuclear stress    Equivocal myocardial perfusion scan.    There is a mild to moderate intensity, small to moderate sized, equivocal perfusion abnormality that is fixed in the inferior and inferolateral wall(s). This finding is equivocal due to bowel interference.    There are no other significant perfusion abnormalities.    The visually estimated ejection fraction is normal at rest and normal  during stress.    There is normal wall motion at rest and post stress.    LV cavity size is normal at rest and normal at stress.    The EKG portion of this study is negative for ischemia.    The patient reported no chest pain during the stress test.    There were no arrhythmias during stress.    When compared to the previous study from 4/15/2021, the inferior wall defect is now present on stress and rest images. There is underlying bowel which may be interfering.         Pending Diagnostic Studies:     None         Medications:  Reconciled Home Medications:      Medication List      CONTINUE taking these medications    acetaminophen 500 MG tablet  Commonly known as: TYLENOL  Take 2 tablets (1,000 mg total) by mouth every 8 (eight) hours as needed for Pain.     apixaban 5 mg Tab  Commonly known as: ELIQUIS  Take 5 mg by mouth once daily.     aspirin 81 MG EC tablet  Commonly known as: ECOTRIN  Take 81 mg by mouth once daily.     biotin 1 mg tablet  Take 1,000 mcg by mouth once daily.     BREZTRI AEROSPHERE 160-9-4.8 mcg/actuation Hfaa  Generic drug: budesonide-glycopyr-formoterol  Inhale 2 puffs into the lungs 2 (two) times daily.     fish oil-omega-3 fatty acids 300-1,000 mg capsule  Take 1 capsule by mouth once daily.     fluticasone propionate 50 mcg/actuation nasal spray  Commonly known as: FLONASE  1 spray by Each Nostril route daily as needed (congestion).     furosemide 20 MG tablet  Commonly known as: LASIX  Take 1 tablet by mouth daily If weight gain 2 to3 lbs for 2 to 5 days. If no improvement, call MD     inhalation spacing device  Use as directed for inhalation.     losartan 25 MG tablet  Commonly known as: COZAAR  Take 1 tablet (25 mg total) by mouth once daily.     metoprolol succinate 100 MG 24 hr tablet  Commonly known as: TOPROL-XL  Take 1 tablet (100 mg total) by mouth once daily.     ondansetron 4 MG tablet  Commonly known as: ZOFRAN  Take 1 tablet (4 mg total) by mouth every 8 (eight) hours  as needed for Nausea.     rosuvastatin 5 MG tablet  Commonly known as: CRESTOR  Take 1 tablet (5 mg total) by mouth once daily.     vitamin D 1000 units Tab  Commonly known as: VITAMIN D3  TAKE 2 TABLETS ONE TIME DAILY        ASK your doctor about these medications    MICAFUNGIN 100 MG/100 ML NS (READY TO MIX SYSTEM)  Inject 100 mLs (100 mg total) into the vein once daily. End date 5/14/2022. for 9 days  Ask about: Should I take this medication?            Indwelling Lines/Drains at time of discharge:   Lines/Drains/Airways     Peripherally Inserted Central Catheter Line  Duration           PICC Double Lumen 05/05/22 1609 right basilic 9 days                Time spent on the discharge of patient: 35 minutes     Discussed patient's plan of care with CARSON BarnesC  Department of Hospital Medicine  [facility] - Telemetry Stepdown ([place]-)

## 2022-05-15 NOTE — ASSESSMENT & PLAN NOTE
Patient reports episode of epigastric/mid-sternal CP onset acutely this morning while getting dressed. Denies abdominal pain, CP, SOB at present  - Troponin negative x3  - RUQ US without cholecystitis   - EKG without ST elevation or depression   - Nuclear stress echo ordered, without evidence of reversible ischemia  - outpatient cardiology referral placed for mild-to-moderate sized scar in the inferior wall

## 2022-05-16 ENCOUNTER — TELEPHONE (OUTPATIENT)
Dept: INFECTIOUS DISEASES | Facility: CLINIC | Age: 77
End: 2022-05-16
Payer: MEDICARE

## 2022-05-16 ENCOUNTER — OFFICE VISIT (OUTPATIENT)
Dept: INTERNAL MEDICINE | Facility: CLINIC | Age: 77
End: 2022-05-16
Payer: MEDICARE

## 2022-05-16 VITALS
WEIGHT: 158.31 LBS | DIASTOLIC BLOOD PRESSURE: 60 MMHG | HEART RATE: 61 BPM | SYSTOLIC BLOOD PRESSURE: 120 MMHG | BODY MASS INDEX: 27.03 KG/M2 | OXYGEN SATURATION: 94 % | HEIGHT: 64 IN

## 2022-05-16 DIAGNOSIS — N18.4 CHRONIC KIDNEY DISEASE, STAGE IV (SEVERE): ICD-10-CM

## 2022-05-16 DIAGNOSIS — Z09 HOSPITAL DISCHARGE FOLLOW-UP: ICD-10-CM

## 2022-05-16 DIAGNOSIS — N13.30 HYDROURETERONEPHROSIS: ICD-10-CM

## 2022-05-16 DIAGNOSIS — K80.20 CALCULUS OF GALLBLADDER WITHOUT CHOLECYSTITIS WITHOUT OBSTRUCTION: ICD-10-CM

## 2022-05-16 DIAGNOSIS — B37.7 CANDIDEMIA: ICD-10-CM

## 2022-05-16 DIAGNOSIS — I50.32 CHRONIC DIASTOLIC HEART FAILURE: ICD-10-CM

## 2022-05-16 DIAGNOSIS — N20.1 RIGHT URETERAL STONE: ICD-10-CM

## 2022-05-16 DIAGNOSIS — I10 ESSENTIAL HYPERTENSION: ICD-10-CM

## 2022-05-16 DIAGNOSIS — Z95.828 S/P PICC CENTRAL LINE PLACEMENT: ICD-10-CM

## 2022-05-16 DIAGNOSIS — R93.1 ABNORMAL NUCLEAR CARDIAC IMAGING TEST: ICD-10-CM

## 2022-05-16 DIAGNOSIS — F41.9 ANXIETY: ICD-10-CM

## 2022-05-16 PROCEDURE — 99999 PR PBB SHADOW E&M-EST. PATIENT-LVL III: CPT | Mod: PBBFAC,,, | Performed by: INTERNAL MEDICINE

## 2022-05-16 PROCEDURE — 3074F PR MOST RECENT SYSTOLIC BLOOD PRESSURE < 130 MM HG: ICD-10-PCS | Mod: CPTII,S$GLB,, | Performed by: INTERNAL MEDICINE

## 2022-05-16 PROCEDURE — 1111F PR DISCHARGE MEDS RECONCILED W/ CURRENT OUTPATIENT MED LIST: ICD-10-PCS | Mod: CPTII,S$GLB,, | Performed by: INTERNAL MEDICINE

## 2022-05-16 PROCEDURE — 3288F FALL RISK ASSESSMENT DOCD: CPT | Mod: CPTII,S$GLB,, | Performed by: INTERNAL MEDICINE

## 2022-05-16 PROCEDURE — 99499 UNLISTED E&M SERVICE: CPT | Mod: S$PBB,,, | Performed by: INTERNAL MEDICINE

## 2022-05-16 PROCEDURE — 99214 PR OFFICE/OUTPT VISIT, EST, LEVL IV, 30-39 MIN: ICD-10-PCS | Mod: S$GLB,,, | Performed by: INTERNAL MEDICINE

## 2022-05-16 PROCEDURE — 1125F PR PAIN SEVERITY QUANTIFIED, PAIN PRESENT: ICD-10-PCS | Mod: CPTII,S$GLB,, | Performed by: INTERNAL MEDICINE

## 2022-05-16 PROCEDURE — 99499 RISK ADDL DX/OHS AUDIT: ICD-10-PCS | Mod: S$PBB,,, | Performed by: INTERNAL MEDICINE

## 2022-05-16 PROCEDURE — 99214 OFFICE O/P EST MOD 30 MIN: CPT | Mod: S$GLB,,, | Performed by: INTERNAL MEDICINE

## 2022-05-16 PROCEDURE — 1125F AMNT PAIN NOTED PAIN PRSNT: CPT | Mod: CPTII,S$GLB,, | Performed by: INTERNAL MEDICINE

## 2022-05-16 PROCEDURE — 1111F DSCHRG MED/CURRENT MED MERGE: CPT | Mod: CPTII,S$GLB,, | Performed by: INTERNAL MEDICINE

## 2022-05-16 PROCEDURE — 3078F DIAST BP <80 MM HG: CPT | Mod: CPTII,S$GLB,, | Performed by: INTERNAL MEDICINE

## 2022-05-16 PROCEDURE — 3288F PR FALLS RISK ASSESSMENT DOCUMENTED: ICD-10-PCS | Mod: CPTII,S$GLB,, | Performed by: INTERNAL MEDICINE

## 2022-05-16 PROCEDURE — 99999 PR PBB SHADOW E&M-EST. PATIENT-LVL III: ICD-10-PCS | Mod: PBBFAC,,, | Performed by: INTERNAL MEDICINE

## 2022-05-16 PROCEDURE — 3074F SYST BP LT 130 MM HG: CPT | Mod: CPTII,S$GLB,, | Performed by: INTERNAL MEDICINE

## 2022-05-16 PROCEDURE — 3078F PR MOST RECENT DIASTOLIC BLOOD PRESSURE < 80 MM HG: ICD-10-PCS | Mod: CPTII,S$GLB,, | Performed by: INTERNAL MEDICINE

## 2022-05-16 PROCEDURE — 1101F PR PT FALLS ASSESS DOC 0-1 FALLS W/OUT INJ PAST YR: ICD-10-PCS | Mod: CPTII,S$GLB,, | Performed by: INTERNAL MEDICINE

## 2022-05-16 PROCEDURE — 1101F PT FALLS ASSESS-DOCD LE1/YR: CPT | Mod: CPTII,S$GLB,, | Performed by: INTERNAL MEDICINE

## 2022-05-16 RX ORDER — BUSPIRONE HYDROCHLORIDE 5 MG/1
5 TABLET ORAL 2 TIMES DAILY PRN
Qty: 60 TABLET | Refills: 2 | Status: SHIPPED | OUTPATIENT
Start: 2022-05-16 | End: 2022-08-22

## 2022-05-16 NOTE — PLAN OF CARE
[facility] - Telemetry Stepdown ([facility])  Discharge Final Note    Primary Care Provider: Celia Carlisle MD    Expected Discharge Date: 5/13/2022    Patient discharged to home via personal transportation.     Patient's bedside nurse and patient/family notified of the above.      Final Discharge Note (most recent)       Final Note - 05/16/22 1515          Final Note    Assessment Type Final Discharge Note (P)      Anticipated Discharge Disposition Home or Self Care (P)         Post-Acute Status    Post-Acute Authorization Other (P)      Other Status No Post-Acute Service Needs (P)                      Important Message from Medicare             Contact Info       [facility] - Infectious Disease 1st Fl   Specialty: Infectious Diseases    [address]  [facility] [address]   Phone: [phone]       Next Steps: Follow up    Instructions: Nurse will call to schedule follow up appointment; however, if you do not hear from someone within 48 hours contact the number listed.            Future Appointments   Date Time Provider Department Center   5/20/2022  3:00 PM Carlos Dvais MD [facility] CARDIO [facility]   5/27/2022  3:30 PM Baltazar Rivera MD [facility] ID [facility]       SW scheduled post-discharge follow-up appointment and information added to AVS.     SW was informed that patient will resume home health services with [facility].    Francy Lu LMSW  [facility]  Ext. [phone]

## 2022-05-16 NOTE — TELEPHONE ENCOUNTER
Patient has completed 14 day course of micafungin for candidemia. Blood cultures 4/28 grew Candida glabrata with suspected urinary source due to obstructing stone. Cleared quickly and no evidence of vegetations on 2D Echo. No symptoms of eye involvement reported. Infusion team has been updated and will arrange for PICC removal. MA notified to please schedule follow up clinic appointment.

## 2022-05-16 NOTE — TELEPHONE ENCOUNTER
----- Message from J Luis hPillips DO sent at 5/16/2022  9:01 AM CDT -----  Contact: Patient  Ana Weiss. Not sure if I told you Friday but micafungin is completed and line can come out for Jenn. Can schedule follow up with anyone available this week or next. Thanks.     ----- Message -----  From: Isela Segovia MA  Sent: 5/13/2022   4:53 PM CDT  To: J Luis Phillips DO    Needs follow up?  ----- Message -----  From: Gordon Villarreal  Sent: 5/13/2022   4:52 PM CDT  To: Ranulfo NOEL Staff    Patient requesting call back in regards to rescheduling hospital follow up appointment.      Patient @[phone] (home)

## 2022-05-17 ENCOUNTER — TELEPHONE (OUTPATIENT)
Dept: INTERNAL MEDICINE | Facility: CLINIC | Age: 77
End: 2022-05-17
Payer: MEDICARE

## 2022-05-17 NOTE — PROGRESS NOTES
Patient, Misa Barajas (MRN #[id number]), presented with a recent Estimated Glumerular Filtration Rate (EGFR) between 15 and 29 consistent with the definition of chronic kidney disease stage 4 (ICD10 - N18.4).    eGFR if non    Date Value Ref Range Status   05/12/2022 28.5 (A) >60 mL/min/1.73 m^2 Final     Comment:     Calculation used to obtain the estimated glomerular filtration  rate (eGFR) is the CKD-EPI equation.          The patient's chronic kidney disease stage 4 was monitored, evaluated, addressed and/or treated. This addendum to the medical record is made on 05/17/2022.

## 2022-05-17 NOTE — PROGRESS NOTES
Patient ID: Misa Barajas is a 76 y.o. female.    Chief Complaint: Hospital Follow Up and Back Pain    HPI Misa is a 76 y.o. female with HTN, HLD, aortic atherosclerosis, atrial fibrillation, chronic kidney disease stage 3, chronic diastolic CHF, osteoporosis and COPD who presents for hospital discharge follow up and for routine follow up of medical conditions. She was last seen in July 2021 and was supposed to follow up 3 months later. She presents with daughters Diamond and Iqra today.     Patient was initially hospitalized for right side kidney stone causing hydronephrosis and with pyelonephritis.  Ureteral stents were placed.  She was also found have fungal blood stream infection. She was treated with IV antibiotics and IV antifungal medication.  At time of discharge, she was transitioned to oral antibiotics but continued on IV micafungin via PICC line.  She has completed both her oral antibiotics and her IV anti fungal medication.  She still has a PICC line in place.  Patient daughters are unsure of how to get PICC line removed.  They have contacted both [facility] and the infusion center, but with no luck on assistance with removal.  Patient plans to follow-up with Dr. Knutson in Urology soon for removal of stents. She was discharged home after 8 days in the hospital.      She was on her way to an infectious disease clinic appointment when she began complaining of right-sided chest pain.  She was brought to the emergency department and stayed overnight.  She had an EKG, echo, and stress test.  Stress test showed irreversible scar in inferior portion of the heart.  Patient sees  in cardiology as an outpatient already. But hospital scheduled follow up with a different cardiologist.     Daughters are wondering if anything needs to be done with the gallbladder stone that was seen on imaging.   I have reviewed all in hospital imaging results and last in hospital labs with patient and daughters  today.     Pt is feeling well today and has no acute complaints or concerns at this time. Still receiving home health services.    At the end of our visit, patient's daughter asks if patient can have something to be taken p.r.n. for anxiety.  She feels that her mother has episodes of acute anxiety.  Patient agrees with daughters assessment.  She would prefer not to take a daily medication.      Review of Systems   Psychiatric/Behavioral: The patient is nervous/anxious.    All other systems reviewed and are negative.     Objective:     Vitals:    05/16/22 1515   BP: 120/60   Pulse: 61        Physical Exam  Vitals reviewed.   Constitutional:       General: She is not in acute distress.     Appearance: Normal appearance. She is well-developed. She is not ill-appearing, toxic-appearing or diaphoretic.      Comments: In wheelchair   HENT:      Head: Normocephalic and atraumatic.      Right Ear: External ear normal.      Left Ear: External ear normal.      Nose: Nose normal.   Eyes:      General: No scleral icterus.        Right eye: No discharge.         Left eye: No discharge.      Extraocular Movements: Extraocular movements intact.      Conjunctiva/sclera: Conjunctivae normal.   Cardiovascular:      Rate and Rhythm: Normal rate and regular rhythm.      Heart sounds: Normal heart sounds. No murmur heard.    No friction rub. No gallop.   Pulmonary:      Effort: Pulmonary effort is normal. No respiratory distress.      Breath sounds: Normal breath sounds. No stridor. No wheezing, rhonchi or rales.   Musculoskeletal:      Right lower leg: No edema.      Left lower leg: No edema.   Skin:     General: Skin is warm and dry.      Comments: PICC line-RUE, bandage in place. No signs of infection   Neurological:      General: No focal deficit present.      Mental Status: She is alert and oriented to person, place, and time. Mental status is at baseline.   Psychiatric:         Mood and Affect: Mood normal.         Behavior:  Behavior normal.         Thought Content: Thought content normal.         Judgment: Judgment normal.         Assessment:       1. Hospital discharge follow-up    2. Essential hypertension Well controlled   3. Chronic diastolic heart failure Well controlled   4. Right ureteral stone Chronic   5. Right sided hydroureteronephrosis Active   6. Candidemia Inactive   7. S/P PICC central line placement Active   8. Abnormal nuclear cardiac imaging test Active   9. Calculus of gallbladder without cholecystitis without obstruction Active   10. Anxiety Active   11. Chronic kidney disease, stage IV (severe) Chronic       Plan:         Hospital discharge follow-up    Essential hypertension  Comments:  Continue current medication    Chronic diastolic heart failure  Comments:  Continue current medication.  Follow-up with cardiology    Right ureteral stone  Comments:  Follow up with urology     Right sided hydroureteronephrosis  Comments:  Improved after stent placement. F/u with urology    Candidemia  Comments:  s/p IV micafungin. Needs PICC line pulled. Contacting Infusion clinic and HH to see who can help    S/P PICC central line placement  Comments:  s/p IV micafungin. Needs PICC line pulled. Contacting Infusion clinic and HH to see who can help    Abnormal nuclear cardiac imaging test  Comments:  Asymptomatic. Follow up with cardiology     Calculus of gallbladder without cholecystitis without obstruction  Comments:  Asymptomatic. Will monitor    Anxiety  Comments:  Start buspar PRN. CrCl =31, ok to start this medication  Orders:  -     busPIRone (BUSPAR) 5 MG Tab; Take 1 tablet (5 mg total) by mouth 2 (two) times daily as needed (anxiety).  Dispense: 60 tablet; Refill: 2    Chronic kidney disease, stage IV (severe)  Comments:  Continue careful fluid balance. CKD vs CHF. Avoid NSAIDs. Renally dose meds. CrCl =31  Orders:  -     Basic Metabolic Panel; Future; Expected date: 05/16/2022      BMP lab in one month to monitor renal  function  RTC 3 months and PRN    Warning signs discussed, patient to call with any further issues or worsening of symptoms.       Parts of the above note were dictated using a voice dictation software. Please excuse any grammatical or typographical errors.

## 2022-05-17 NOTE — TELEPHONE ENCOUNTER
----- Message from Celia Carlisle MD sent at 5/16/2022  4:51 PM CDT -----  Aditi, please communicate with Adrian about this patient tomorrow. Adrian is talking to the infusion center to try to get her PICC line pulled there. I already called home health and they will not pull the PICC line.     Please talk with Adrian about the plan for pulling the PICC line and then tell the patient/family. You can call her daughter Ivy at [phone] or daughter Diamond at [phone].     Thanks

## 2022-05-17 NOTE — TELEPHONE ENCOUNTER
I spoke to the daughter Iqra and she said that infusion people call to take the picc line out but her mom does not feel good  today so someone is going to come out tomorrow and take it out.

## 2022-05-20 ENCOUNTER — OFFICE VISIT (OUTPATIENT)
Dept: CARDIOLOGY | Facility: CLINIC | Age: 77
End: 2022-05-20
Payer: MEDICARE

## 2022-05-20 ENCOUNTER — LAB VISIT (OUTPATIENT)
Dept: LAB | Facility: HOSPITAL | Age: 77
End: 2022-05-20
Payer: MEDICARE

## 2022-05-20 VITALS
WEIGHT: 161.81 LBS | SYSTOLIC BLOOD PRESSURE: 97 MMHG | BODY MASS INDEX: 27.63 KG/M2 | HEIGHT: 64 IN | DIASTOLIC BLOOD PRESSURE: 51 MMHG | HEART RATE: 63 BPM

## 2022-05-20 DIAGNOSIS — E78.2 MIXED HYPERLIPIDEMIA: ICD-10-CM

## 2022-05-20 DIAGNOSIS — I50.32 CHRONIC DIASTOLIC HEART FAILURE: ICD-10-CM

## 2022-05-20 DIAGNOSIS — I50.32 CHRONIC DIASTOLIC HEART FAILURE: Primary | ICD-10-CM

## 2022-05-20 DIAGNOSIS — I10 ESSENTIAL HYPERTENSION: ICD-10-CM

## 2022-05-20 DIAGNOSIS — I48.0 PAROXYSMAL ATRIAL FIBRILLATION: ICD-10-CM

## 2022-05-20 DIAGNOSIS — I70.0 AORTIC ATHEROSCLEROSIS: ICD-10-CM

## 2022-05-20 LAB
ANION GAP SERPL CALC-SCNC: 9 MMOL/L (ref 8–16)
BUN SERPL-MCNC: 24 MG/DL (ref 8–23)
CALCIUM SERPL-MCNC: 10 MG/DL (ref 8.7–10.5)
CHLORIDE SERPL-SCNC: 104 MMOL/L (ref 95–110)
CO2 SERPL-SCNC: 27 MMOL/L (ref 23–29)
CREAT SERPL-MCNC: 1.4 MG/DL (ref 0.5–1.4)
EST. GFR  (AFRICAN AMERICAN): 41.5 ML/MIN/1.73 M^2
EST. GFR  (NON AFRICAN AMERICAN): 36 ML/MIN/1.73 M^2
GLUCOSE SERPL-MCNC: 107 MG/DL (ref 70–110)
POTASSIUM SERPL-SCNC: 4.4 MMOL/L (ref 3.5–5.1)
SODIUM SERPL-SCNC: 140 MMOL/L (ref 136–145)

## 2022-05-20 PROCEDURE — 1111F DSCHRG MED/CURRENT MED MERGE: CPT | Mod: CPTII,GC,S$GLB, | Performed by: STUDENT IN AN ORGANIZED HEALTH CARE EDUCATION/TRAINING PROGRAM

## 2022-05-20 PROCEDURE — 80048 BASIC METABOLIC PNL TOTAL CA: CPT | Performed by: STUDENT IN AN ORGANIZED HEALTH CARE EDUCATION/TRAINING PROGRAM

## 2022-05-20 PROCEDURE — 36415 COLL VENOUS BLD VENIPUNCTURE: CPT | Performed by: STUDENT IN AN ORGANIZED HEALTH CARE EDUCATION/TRAINING PROGRAM

## 2022-05-20 PROCEDURE — 99999 PR PBB SHADOW E&M-EST. PATIENT-LVL IV: ICD-10-PCS | Mod: PBBFAC,GC,, | Performed by: STUDENT IN AN ORGANIZED HEALTH CARE EDUCATION/TRAINING PROGRAM

## 2022-05-20 PROCEDURE — 99215 OFFICE O/P EST HI 40 MIN: CPT | Mod: GC,S$GLB,, | Performed by: STUDENT IN AN ORGANIZED HEALTH CARE EDUCATION/TRAINING PROGRAM

## 2022-05-20 PROCEDURE — 99999 PR PBB SHADOW E&M-EST. PATIENT-LVL IV: CPT | Mod: PBBFAC,GC,, | Performed by: STUDENT IN AN ORGANIZED HEALTH CARE EDUCATION/TRAINING PROGRAM

## 2022-05-20 PROCEDURE — 99215 PR OFFICE/OUTPT VISIT, EST, LEVL V, 40-54 MIN: ICD-10-PCS | Mod: GC,S$GLB,, | Performed by: STUDENT IN AN ORGANIZED HEALTH CARE EDUCATION/TRAINING PROGRAM

## 2022-05-20 PROCEDURE — 1111F PR DISCHARGE MEDS RECONCILED W/ CURRENT OUTPATIENT MED LIST: ICD-10-PCS | Mod: CPTII,GC,S$GLB, | Performed by: STUDENT IN AN ORGANIZED HEALTH CARE EDUCATION/TRAINING PROGRAM

## 2022-05-20 NOTE — PROGRESS NOTES
Cardiology Clinic Note  Reason for Visit: follow up    HPI:   76 y.o. with a past medical history of COPD, HFpEF (60% on 5/4/22), pAF (eliquis), HTN, HLD presenting to clinic for follow up    Was recently admitted to [facility] on 3/12-3/15 for epigastric pain.  Thought to be atypical angina.  EKG was NSR without ST changes.  Trop was neg x3.  On 5/13 had nuclear stress test which showed no reversible ischemia but mild-mod scar in the inferior wall.  Echo prior showed an EF of 60% with segmental WMA.  She was discharged home and follows up in clinic today.  She says she has no complaints.  Has been having issues with kidney stones and has plans for surgery in 10d.  She denies any orthopnea dyspnea on exertion however does have some LE edema which she says is mild.  Her last Cr was 1.7 and that was on day of discharge.  She is hesitant about changing any of her diuretic regimen around given the possible effects on her kidney with her kidney stone and she would like to just monitor and come back and visit us once post op.    ROS:    Constitution: Negative for fever, chills, weight loss or gain.   HENT: Negative for sore throat, rhinorrhea, or headache.  Eyes: Negative for blurred or double vision.   Cardiovascular: See above  Pulmonary: Negative for SOB   Gastrointestinal: Negative for abdominal pain, nausea, vomiting, or diarrhea.   : Negative for dysuria.   Neurological: Negative for focal weakness or sensory changes.  PMH:     Past Medical History:   Diagnosis Date    Acute hypoxemic respiratory failure 6/26/2021    Acute superficial venous thrombosis of lower extremity, bilateral 1/25/2022    Aortic atherosclerosis     Arthritis     knee joint pain    Breast cancer 2002    left breast & lymph nodes-s/p sx with chemo    Bronchitis     with flu-2/2014    Cataracts, bilateral     Chronic anticoagulation 5/4/2022    Chronic diastolic heart failure 12/24/2019    Chronic kidney disease, stage 3     History  of chemotherapy     last treatment 12/2002 (had 8 treatments)    Hyperlipidemia 11/20/2016    Hypertension     Lymphedema of both lower extremities 1/25/2022    Nephrolithiasis 6/2/2014    Osteoporosis     Paroxysmal atrial fibrillation 6/7/2021    Renal calculi     hematuria    Renal osteodystrophy 1/14/2016    Venous stasis dermatitis of both lower extremities 1/25/2022    Vitamin D insufficiency      Past Surgical History:   Procedure Laterality Date    BREAST BIOPSY Left 2002    core bx, +    BREAST BIOPSY Right 2018    core    BREAST BIOPSY Right 2019    BREAST LUMPECTOMY Left 2002    CYSTOSCOPY  4/28/2022    Procedure: CYSTOSCOPY;  Surgeon: [facility] MD;  [facility];  Service: Urology;;    LITHOTRIPSY      MASTECTOMY Left 06/2002    left-& lymph node dissection    RETROGRADE PYELOGRAPHY Right 4/28/2022    Procedure: PYELOGRAM, RETROGRADE;  Surgeon: [facility] MD;  [facility];  Service: Urology;  Laterality: Right;    ROBOT-ASSISTED LAPAROSCOPIC PARTIAL NEPHRECTOMY USING DA JESÚS XI Right 3/11/2021    Procedure: XI ROBOTIC NEPHRECTOMY, PARTIAL;  Surgeon: Taz Ortega MD;  Location: [facility] OR 2ND FLR;  Service: Urology;  Laterality: Right;  4hr/ gen with regional  [place] confirmation:  [id number] for 11:15am case NC    ureteroscopy Bilateral     6.14     Allergies:     Review of patient's allergies indicates:   Allergen Reactions    Adhesive Rash     Pt states she removed a LATEX bandaid and the skin beneath was swollen and red. No other latex causes a reaction.     Medications:     Current Outpatient Medications on File Prior to Visit   Medication Sig Dispense Refill    acetaminophen (TYLENOL) 500 MG tablet Take 2 tablets (1,000 mg total) by mouth every 8 (eight) hours as needed for Pain. 42 tablet 0    apixaban (ELIQUIS) 5 mg Tab Take 5 mg by mouth once daily.      aspirin (ECOTRIN) 81 MG EC tablet Take 81 mg by mouth once daily.       biotin 1 mg tablet Take 1,000 mcg by mouth once daily.      budesonide-glycopyr-formoterol (BREZTRI AEROSPHERE) 160-9-4.8 mcg/actuation HFAA Inhale 2 puffs into the lungs 2 (two) times daily. 32.1 g 3    busPIRone (BUSPAR) 5 MG Tab Take 1 tablet (5 mg total) by mouth 2 (two) times daily as needed (anxiety). 60 tablet 2    fish oil-omega-3 fatty acids 300-1,000 mg capsule Take 1 capsule by mouth once daily.      fluticasone propionate (FLONASE) 50 mcg/actuation nasal spray 1 spray by Each Nostril route daily as needed (congestion).      furosemide (LASIX) 20 MG tablet Take 1 tablet by mouth daily If weight gain 2 to3 lbs for 2 to 5 days. If no improvement, call MD (Patient taking differently: Take 1 tablet by mouth daily If weight gain 2 to3 lbs for 2 to 5 days. If no improvement, call MD) 30 tablet 6    inhalation spacing device Use as directed for inhalation. 1 Device 0    losartan (COZAAR) 25 MG tablet Take 1 tablet (25 mg total) by mouth once daily. 30 tablet 0    metoprolol succinate (TOPROL-XL) 100 MG 24 hr tablet Take 1 tablet (100 mg total) by mouth once daily. 30 tablet 6    ondansetron (ZOFRAN) 4 MG tablet Take 1 tablet (4 mg total) by mouth every 8 (eight) hours as needed for Nausea. 15 tablet 0    rosuvastatin (CRESTOR) 5 MG tablet Take 1 tablet (5 mg total) by mouth once daily. 30 tablet 0    vitamin D (VITAMIN D3) 1000 units Tab TAKE 2 TABLETS ONE TIME DAILY 180 tablet 0     No current facility-administered medications on file prior to visit.     Social History:     Social History     Tobacco Use    Smoking status: Former Smoker     Packs/day: 1.00     Years: 50.00     Pack years: 50.00     Types: Cigarettes     Start date:      Quit date: 2009     Years since quittin.0    Smokeless tobacco: Never Used   Substance Use Topics    Alcohol use: No     Family History:     Family History   Problem Relation Age of Onset    Bone cancer Mother     Breast cancer Mother      Arthritis Mother         Breast Cancer    Breast cancer Sister     Cancer Father         Asbestos cancer    No Known Problems Brother     Fibroids Daughter     Crohn's disease Daughter     No Known Problems Daughter     Arthritis Sister         Breast Cancer    Anesthesia problems Neg Hx     Glaucoma Neg Hx      Physical Exam:   There were no vitals taken for this visit.   Wt Readings from Last 4 Encounters:   05/16/22 71.8 kg (158 lb 4.6 oz)   05/12/22 73.9 kg (163 lb)   05/04/22 75.8 kg (167 lb)   02/24/22 76.1 kg (167 lb 12.3 oz)         Constitutional: No distress, obese, conversant  HEENT: Sclera anicteric, PERRLA, EOMI  Neck: JVD at 8, no masses, good movement  CV: RRR, S1 and S2 normal, no additional heart sounds or murmurs. Pulses 2+ and equal bilaterally in radial arteries and DP  Pulm: Clear to auscultation bilaterally with symmetrical expansion. Chest wall palpated for reproduction of pain symptoms  GI: Abdomen soft, non-tender, good bowel sounds  Extremities: Both extremities intact and grossly normal, skin is warm, 1+ pitting edema in Le bilaterally  Skin: No ecchymosis, erythema, or ulcers  Psych: AOx3, appropriate affect  Neuro: CNII-XII intact, no focal deficits      Labs:     Lab Results   Component Value Date     05/12/2022    K 3.7 05/12/2022     05/12/2022    CO2 26 05/12/2022    BUN 21 05/12/2022    CREATININE 1.7 (H) 05/12/2022    ANIONGAP 8 05/12/2022     No results found for: HGBA1C  Lab Results   Component Value Date     (H) 05/12/2022     (H) 06/25/2021     (H) 06/23/2021    Lab Results   Component Value Date    WBC 9.20 05/12/2022    HGB 11.3 (L) 05/12/2022    HCT 34.8 (L) 05/12/2022     05/12/2022    GRAN 6.3 05/12/2022    GRAN 67.9 05/12/2022     Lab Results   Component Value Date    CHOL 140 06/14/2021    HDL 65 06/14/2021    LDLCALC 49.4 (L) 06/14/2021    TRIG 128 06/14/2021          Imaging:       EF   Date Value Ref Range Status    05/04/2022 60 % Final   06/09/2021 65 % Final     TTE:   5/4/2022:  · The left ventricle is normal in size with normal systolic function.  · The estimated ejection fraction is 60%.  · There are segmental left ventricular wall motion abnormalities.  · There is abnormal septal wall motion.  · Indeterminate left ventricular diastolic function.  · Normal right ventricular size with normal right ventricular systolic function.  · Mild mitral regurgitation.  · Mild tricuspid regurgitation.  · Intermediate central venous pressure (8 mmHg).  · The estimated PA systolic pressure is 31 mmHg.    Stress Test:  Nuclear stress test 5/13/2022:    Equivocal myocardial perfusion scan.    There is a mild to moderate intensity, small to moderate sized, equivocal perfusion abnormality that is fixed in the inferior and inferolateral wall(s). This finding is equivocal due to bowel interference.    There are no other significant perfusion abnormalities.    The visually estimated ejection fraction is normal at rest and normal during stress.    There is normal wall motion at rest and post stress.    LV cavity size is normal at rest and normal at stress.    The EKG portion of this study is negative for ischemia.    The patient reported no chest pain during the stress test.    There were no arrhythmias during stress.    When compared to the previous study from 4/15/2021, the inferior wall defect is now present on stress and rest images. There is underlying bowel which may be interfering.    Assessment:    76 y.o. with a past medical history of COPD, HFpEF (60% on 5/4/22), pAF (eliquis), HTN, HLD presenting to clinic for follow up     Plan:     #HFpEF:  She is not decompensated however does have some pitting edema and minor JVD.  Minor crackles in her lower lobes  - I discussed about the possiblilty of increasing her lasix to 40mg qD and she said she wanted to wait until she gets her kidney stone removed and then revisit the topic.   She feels great right now and would like to leave everything the same  - continue lasix 20mg qD, losartan 25mg qD, metoprolol succ 100mg qD  - repeat BMP  - will have pt return to clinic in 3 weeks for repeat exam.  If she continues to be volume overloaded at this time will need to increase her lasix with repeat labs as well    #pAFib:  CV 6, HB 1  - AC with eliquis 5mg BID  - continue rate control with metoprolol succ 100mg qD    #HTN:  BP in clinic today was 97/71.  Received home health and BP at home in the 120s systolic  - continue losartan 25mg qD, metoprolol, and lasix 20mg qD  - keep a BP diary for next visit    #HLD:  Last lipid panel in 6/2021: chol 140, tri 128, HDL 65, LDL 49  - continue rosuvastatin 5mg qD  - told her to contact her PCP for repeat lipid panel and a1c    Signed:  Carlos Davis MD  Cardiology Fellow  Pager - [phone]  [facility]  5/20/2022 2:39 PM

## 2022-05-21 ENCOUNTER — PES CALL (OUTPATIENT)
Dept: ADMINISTRATIVE | Facility: CLINIC | Age: 77
End: 2022-05-21
Payer: MEDICARE

## 2022-05-22 ENCOUNTER — TELEPHONE (OUTPATIENT)
Dept: CARDIOLOGY | Facility: HOSPITAL | Age: 77
End: 2022-05-22
Payer: MEDICARE

## 2022-05-22 NOTE — TELEPHONE ENCOUNTER
Called pt back discussing results of her most recent BMP.  Creatinine has improved back to baseline around 1.4.  Discussed that if she wanted to we could increase her lasix to 40mg qD and monitor her.  She said she would like to get this kidney stone removed first and then revisit the topic.  Has f/u appointment with me already scheduled.    Carlos Davis, PGY4  Cardiovascular Disease  [facility]

## 2022-05-24 NOTE — ANESTHESIA PAT ROS NOTE
05/24/2022  Misa Barajas is a 76 y.o., female.      Pre-op Assessment          Review of Systems  Anesthesia Hx:  No problems with previous Anesthesia  Denies Family Hx of Anesthesia complications.   Denies Personal Hx of Anesthesia complications.   Social:  No Alcohol Use, Former Smoker    Hematology/Oncology:  Hematology Normal       -- Cancer in past history:  Breast left surgery and chemotherapy  Oncology Comments: Mastectomy & lymph nodes removed     EENT/Dental:   Reading glases   Cardiovascular:   Exercise tolerance: good Hypertension Dysrhythmias hyperlipidemia RBBB/ Phy. Therapy-2x/wk/Housework/Gardening   Pulmonary:   Pneumonia COPD, mild Shortness of breath Home Oxygen used, if needed   Renal/:   Chronic Renal Disease renal calculi CKD stage 3   Hepatic/GI:  Hepatic/GI Normal    Musculoskeletal:   Arthritis  Knees   Neurological:  Neurology Normal    Endocrine:  Endocrine Normal    Dermatological:   Eczema-bilateral legs   Psych:   anxiety      Anaya Arreaga RN  5/24/22       Anesthesia Assessment: Preoperative EQUATION    Planned Procedure: Procedure(s) (LRB):  CYSTOSCOPY (N/A)  URETEROSCOPY (Right)  REMOVAL, STENT, URETER (Right)  EXTRACTION - STONE (Right)  PLACEMENT-STENT (Right)  CYSTOLITHOLAPAXY (Right)  CYSTOURETEROSCOPY, WITH HOLMIUM LASER LITHOTRIPSY OF URETERAL CALCULUS AND STENT INSERTION (Right)  PYELOGRAM, RETROGRADE (Right)  Requested Anesthesia Type:General  Surgeon: Bonnie Knutson MD  Service: Urology  Known or anticipated Date of Surgery:5/30/2022    Surgeon notes: reviewed and Nephrolithiasis    Previous anesthesia records:4/28/22-Cystoscopy,Insertion,Stent,Ureter Pyelogram, Retrograde-Right-General -no apparent anesthetic complications and tolerated procedure well-no nausea/vomiting     Anesthesia Hx:  No problems with previous Anesthesia LEFT EXTREMITY  "RESTRICTED, LEFT MASTECTOMY History of prior surgery of interest to airway management or planning: Previous anesthesia: General 2002-LEFT MASTECTOMY with general anesthesia.  Denies Family Hx of Anesthesia complications.   Denies Personal Hx of Anesthesia complications.    Airway:  Mallampati: I   Mouth Opening: Normal  TM Distance: Normal  Tongue: Normal  Neck ROM: Normal ROM      Last PCP note: within 1 month , within [facility] , 5/16/22-Hospital discharge follow-Back pain/up +10 more Dx  Subspecialty notes: Cardiology: General, Nephrology, Optometry visit/ Pulmonology visit    Other important co-morbidities: HLD, HTN and Nephrolithiasis     Medical History    Diagnosis Date Comment Source   Aortic atherosclerosis      Arthritis  knee joint pain    Breast cancer 2002 left breast & lymph nodes-s/p sx with chemo    Bronchitis  with flu-2/2014    Cataracts, bilateral      Chronic diastolic heart failure 12/24/2019     Chronic kidney disease, stage 3      History of chemotherapy  last treatment 12/2002 (had 8 treatments)    Hyperlipidemia 11/20/2016     Hypertension      Osteoporosis      Renal calculi  hematuria    Vitamin D insufficiency          Tests already available:  Results have been reviewed. CXR & EKG--5/12/22/ Labs-5/20/22-BMP/ 5/13/22-Glucose x3/ 5/12/22-Glucose x2/Troponin 1/Troponin 1 #2/ CBC/CBC/Lipase, etc./ 5/9/22-CBC/CMP/ 5/16/22- 5/5/22/-CBC/CMP/ 5/3/22-Troponin 1/ 5/2/22-5/1/22/ 4/28/22      Plan: Phone pending     Testing:  None Needed      Patient  has previously scheduled Medical Appointment:Appt. on 5/27/22, prior to the surgery date.    Navigation: Phone Completed  .              Consults scheduled.Pending-Cards "Clearance" & Eliquis & baby ASA guidelines  from Dr. Carlos Davis-Request sent in-basket.              Results will be tracked by Preop Clinic.           Anaya Arreaga RN  5/24/22    Addendum:Received  Cards  "Clearance"  with  Eliquis & baby Aspirin pre-op guidelines from, Dr." Carlos Davis MD via  in-basket  note on 5/24/22:    Please hold aspirin 5 days prior to the surgery and hold eliquis 2 days prior to the surgery.     RCRI: 6% 30d risk of death, MI, or cardiac arrest HERNANDEZ:0.5% risk of MI or cardiac arrest intraoperatively or up to 30d post op    From my perspective she can proceed with the surgery as long as the surgeon and anesthesiologist are aware that she is not fully euvolemic and has heart failure with preserved ejection fraction and anesthesia should be used judiciously along with IV fluids.    Carlos Davis     Patient was notified of these pre-op med guideline for baby Aspirin & Eliquis.    Anaya Arreaga RN  5/25/22

## 2022-05-26 ENCOUNTER — LAB VISIT (OUTPATIENT)
Dept: LAB | Facility: HOSPITAL | Age: 77
End: 2022-05-26
Attending: UROLOGY
Payer: MEDICARE

## 2022-05-26 DIAGNOSIS — N20.0 NEPHROLITHIASIS: ICD-10-CM

## 2022-05-26 PROCEDURE — 87086 URINE CULTURE/COLONY COUNT: CPT | Performed by: UROLOGY

## 2022-05-27 ENCOUNTER — OFFICE VISIT (OUTPATIENT)
Dept: INFECTIOUS DISEASES | Facility: CLINIC | Age: 77
End: 2022-05-27
Payer: MEDICARE

## 2022-05-27 ENCOUNTER — TELEPHONE (OUTPATIENT)
Dept: UROLOGY | Facility: CLINIC | Age: 77
End: 2022-05-27
Payer: MEDICARE

## 2022-05-27 VITALS
DIASTOLIC BLOOD PRESSURE: 60 MMHG | HEART RATE: 65 BPM | WEIGHT: 162.94 LBS | BODY MASS INDEX: 27.82 KG/M2 | TEMPERATURE: 98 F | SYSTOLIC BLOOD PRESSURE: 105 MMHG | HEIGHT: 64 IN

## 2022-05-27 DIAGNOSIS — N20.0 NEPHROLITHIASIS: ICD-10-CM

## 2022-05-27 DIAGNOSIS — B37.7 CANDIDEMIA: ICD-10-CM

## 2022-05-27 DIAGNOSIS — N20.1 RIGHT URETERAL STONE: ICD-10-CM

## 2022-05-27 DIAGNOSIS — I87.2 VENOUS INSUFFICIENCY OF BOTH LOWER EXTREMITIES: Primary | ICD-10-CM

## 2022-05-27 DIAGNOSIS — N13.30 HYDROURETERONEPHROSIS: ICD-10-CM

## 2022-05-27 LAB — BACTERIA UR CULT: NO GROWTH

## 2022-05-27 PROCEDURE — 99212 OFFICE O/P EST SF 10 MIN: CPT | Mod: S$GLB,,, | Performed by: INTERNAL MEDICINE

## 2022-05-27 PROCEDURE — 99999 PR PBB SHADOW E&M-EST. PATIENT-LVL III: CPT | Mod: PBBFAC,,, | Performed by: INTERNAL MEDICINE

## 2022-05-27 PROCEDURE — 3074F SYST BP LT 130 MM HG: CPT | Mod: CPTII,S$GLB,, | Performed by: INTERNAL MEDICINE

## 2022-05-27 PROCEDURE — 3078F PR MOST RECENT DIASTOLIC BLOOD PRESSURE < 80 MM HG: ICD-10-PCS | Mod: CPTII,S$GLB,, | Performed by: INTERNAL MEDICINE

## 2022-05-27 PROCEDURE — 1159F MED LIST DOCD IN RCRD: CPT | Mod: CPTII,S$GLB,, | Performed by: INTERNAL MEDICINE

## 2022-05-27 PROCEDURE — 99999 PR PBB SHADOW E&M-EST. PATIENT-LVL III: ICD-10-PCS | Mod: PBBFAC,,, | Performed by: INTERNAL MEDICINE

## 2022-05-27 PROCEDURE — 3074F PR MOST RECENT SYSTOLIC BLOOD PRESSURE < 130 MM HG: ICD-10-PCS | Mod: CPTII,S$GLB,, | Performed by: INTERNAL MEDICINE

## 2022-05-27 PROCEDURE — 1126F AMNT PAIN NOTED NONE PRSNT: CPT | Mod: CPTII,S$GLB,, | Performed by: INTERNAL MEDICINE

## 2022-05-27 PROCEDURE — 3078F DIAST BP <80 MM HG: CPT | Mod: CPTII,S$GLB,, | Performed by: INTERNAL MEDICINE

## 2022-05-27 PROCEDURE — 99212 PR OFFICE/OUTPT VISIT, EST, LEVL II, 10-19 MIN: ICD-10-PCS | Mod: S$GLB,,, | Performed by: INTERNAL MEDICINE

## 2022-05-27 PROCEDURE — 1160F RVW MEDS BY RX/DR IN RCRD: CPT | Mod: CPTII,S$GLB,, | Performed by: INTERNAL MEDICINE

## 2022-05-27 PROCEDURE — 1126F PR PAIN SEVERITY QUANTIFIED, NO PAIN PRESENT: ICD-10-PCS | Mod: CPTII,S$GLB,, | Performed by: INTERNAL MEDICINE

## 2022-05-27 PROCEDURE — 3288F PR FALLS RISK ASSESSMENT DOCUMENTED: ICD-10-PCS | Mod: CPTII,S$GLB,, | Performed by: INTERNAL MEDICINE

## 2022-05-27 PROCEDURE — 1111F PR DISCHARGE MEDS RECONCILED W/ CURRENT OUTPATIENT MED LIST: ICD-10-PCS | Mod: CPTII,S$GLB,, | Performed by: INTERNAL MEDICINE

## 2022-05-27 PROCEDURE — 1160F PR REVIEW ALL MEDS BY PRESCRIBER/CLIN PHARMACIST DOCUMENTED: ICD-10-PCS | Mod: CPTII,S$GLB,, | Performed by: INTERNAL MEDICINE

## 2022-05-27 PROCEDURE — 1101F PR PT FALLS ASSESS DOC 0-1 FALLS W/OUT INJ PAST YR: ICD-10-PCS | Mod: CPTII,S$GLB,, | Performed by: INTERNAL MEDICINE

## 2022-05-27 PROCEDURE — 1159F PR MEDICATION LIST DOCUMENTED IN MEDICAL RECORD: ICD-10-PCS | Mod: CPTII,S$GLB,, | Performed by: INTERNAL MEDICINE

## 2022-05-27 PROCEDURE — 1101F PT FALLS ASSESS-DOCD LE1/YR: CPT | Mod: CPTII,S$GLB,, | Performed by: INTERNAL MEDICINE

## 2022-05-27 PROCEDURE — 1111F DSCHRG MED/CURRENT MED MERGE: CPT | Mod: CPTII,S$GLB,, | Performed by: INTERNAL MEDICINE

## 2022-05-27 PROCEDURE — 3288F FALL RISK ASSESSMENT DOCD: CPT | Mod: CPTII,S$GLB,, | Performed by: INTERNAL MEDICINE

## 2022-05-27 NOTE — PROGRESS NOTES
Subjective:      Patient ID: Misa Barajas is a 76 y.o. female.    Chief Complaint:Follow-up      History of Present Illness    Follow up from pyelonephritis and fungemia. Off therapy, PICC removed. No dysuria, fever, or chills. Patient has back pain at baseline. No other complaints.   Awaiting removal of stone on 5/30.     Review of Systems   Constitutional: Positive for malaise/fatigue and weight loss. Negative for chills, decreased appetite, fever, night sweats and weight gain.   HENT: Positive for hearing loss. Negative for congestion, ear pain, hoarse voice, sore throat and tinnitus.    Eyes: Negative for blurred vision, redness and visual disturbance.   Cardiovascular: Positive for leg swelling. Negative for chest pain and palpitations.   Respiratory: Positive for shortness of breath. Negative for cough, hemoptysis, sputum production and wheezing.    Hematologic/Lymphatic: Negative for adenopathy. Bruises/bleeds easily.   Skin: Positive for dry skin and itching. Negative for rash and suspicious lesions.   Musculoskeletal: Positive for back pain. Negative for joint pain, myalgias and neck pain.   Gastrointestinal: Negative for abdominal pain, constipation, diarrhea, heartburn, nausea and vomiting.   Genitourinary: Negative for dysuria, flank pain, frequency, hematuria, hesitancy and urgency.   Neurological: Positive for weakness. Negative for dizziness, headaches, numbness and paresthesias.   Psychiatric/Behavioral: Negative for depression and memory loss. The patient has insomnia and is nervous/anxious.      Objective:   Physical Exam  Vitals and nursing note reviewed.   Constitutional:       Appearance: Normal appearance.   HENT:      Head: Normocephalic and atraumatic.   Eyes:      Extraocular Movements: Extraocular movements intact.      Pupils: Pupils are equal, round, and reactive to light.   Cardiovascular:      Rate and Rhythm: Normal rate and regular rhythm.      Pulses: Normal pulses.   Pulmonary:       Effort: Pulmonary effort is normal.   Musculoskeletal:      Right lower leg: Edema present.      Left lower leg: Edema present.   Skin:     General: Skin is warm and dry.   Neurological:      General: No focal deficit present.      Mental Status: She is alert.       Assessment:       1. Venous insufficiency of both lower extremities    2. Right ureteral stone    3. Right sided hydroureteronephrosis    4. Nephrolithiasis    5. Candidemia          Plan:       Patient is doing well. Agree with stone removal. No indication of infection at this time. Recommend antibiotic prophylaxis with gentamicin before procedure. No indication for antifungal treatment at this time.

## 2022-05-27 NOTE — TELEPHONE ENCOUNTER
Called pt to confirm arrival time of 7am for procedure on 5/30/2022. Gave pt NPO instructions and gave pt opportunity to ask questions. Pt verbalized understanding.     Pt was informed that only 1 person would be allowed to accompany them the morning of surgery.  Pt verbalized understanding.

## 2022-05-28 PROBLEM — B37.7 CANDIDEMIA: Status: RESOLVED | Noted: 2022-05-01 | Resolved: 2022-05-28

## 2022-05-28 PROBLEM — N10 ACUTE PYELONEPHRITIS: Status: RESOLVED | Noted: 2022-05-04 | Resolved: 2022-05-28

## 2022-05-30 ENCOUNTER — ANESTHESIA (OUTPATIENT)
Dept: SURGERY | Facility: HOSPITAL | Age: 77
End: 2022-05-30
Payer: MEDICARE

## 2022-05-30 ENCOUNTER — ANESTHESIA EVENT (OUTPATIENT)
Dept: SURGERY | Facility: HOSPITAL | Age: 77
End: 2022-05-30
Payer: MEDICARE

## 2022-05-30 ENCOUNTER — HOSPITAL ENCOUNTER (OUTPATIENT)
Facility: HOSPITAL | Age: 77
Discharge: HOME | End: 2022-05-30
Attending: UROLOGY | Admitting: UROLOGY
Payer: MEDICARE

## 2022-05-30 VITALS
HEIGHT: 64 IN | WEIGHT: 151 LBS | SYSTOLIC BLOOD PRESSURE: 156 MMHG | BODY MASS INDEX: 25.78 KG/M2 | OXYGEN SATURATION: 95 % | RESPIRATION RATE: 21 BRPM | TEMPERATURE: 98 F | HEART RATE: 71 BPM | DIASTOLIC BLOOD PRESSURE: 73 MMHG

## 2022-05-30 DIAGNOSIS — N20.1 URETEROLITHIASIS: Primary | ICD-10-CM

## 2022-05-30 DIAGNOSIS — N20.0 KIDNEY STONE: ICD-10-CM

## 2022-05-30 LAB — POCT GLUCOSE: 99 MG/DL (ref 70–110)

## 2022-05-30 PROCEDURE — D9220A PRA ANESTHESIA: Mod: ANES,,, | Performed by: ANESTHESIOLOGY

## 2022-05-30 PROCEDURE — 52356 CYSTO/URETERO W/LITHOTRIPSY: CPT | Mod: RT,,, | Performed by: UROLOGY

## 2022-05-30 PROCEDURE — 71000016 HC POSTOP RECOV ADDL HR: Performed by: UROLOGY

## 2022-05-30 PROCEDURE — C1758 CATHETER, URETERAL: HCPCS | Performed by: UROLOGY

## 2022-05-30 PROCEDURE — D9220A PRA ANESTHESIA: ICD-10-PCS | Mod: ANES,,, | Performed by: ANESTHESIOLOGY

## 2022-05-30 PROCEDURE — 36000707: Performed by: UROLOGY

## 2022-05-30 PROCEDURE — 37000009 HC ANESTHESIA EA ADD 15 MINS: Performed by: UROLOGY

## 2022-05-30 PROCEDURE — C1769 GUIDE WIRE: HCPCS | Performed by: UROLOGY

## 2022-05-30 PROCEDURE — C2617 STENT, NON-COR, TEM W/O DEL: HCPCS | Performed by: UROLOGY

## 2022-05-30 PROCEDURE — D9220A PRA ANESTHESIA: Mod: CRNA,,, | Performed by: NURSE ANESTHETIST, CERTIFIED REGISTERED

## 2022-05-30 PROCEDURE — D9220A PRA ANESTHESIA: ICD-10-PCS | Mod: CRNA,,, | Performed by: NURSE ANESTHETIST, CERTIFIED REGISTERED

## 2022-05-30 PROCEDURE — 37000008 HC ANESTHESIA 1ST 15 MINUTES: Performed by: UROLOGY

## 2022-05-30 PROCEDURE — 63600175 PHARM REV CODE 636 W HCPCS: Performed by: NURSE ANESTHETIST, CERTIFIED REGISTERED

## 2022-05-30 PROCEDURE — 36000706: Performed by: UROLOGY

## 2022-05-30 PROCEDURE — 71000015 HC POSTOP RECOV 1ST HR: Performed by: UROLOGY

## 2022-05-30 PROCEDURE — 27201423 OPTIME MED/SURG SUP & DEVICES STERILE SUPPLY: Performed by: UROLOGY

## 2022-05-30 PROCEDURE — 63600175 PHARM REV CODE 636 W HCPCS

## 2022-05-30 PROCEDURE — 25000003 PHARM REV CODE 250

## 2022-05-30 PROCEDURE — 82365 CALCULUS SPECTROSCOPY: CPT | Performed by: UROLOGY

## 2022-05-30 PROCEDURE — 71000044 HC DOSC ROUTINE RECOVERY FIRST HOUR: Performed by: UROLOGY

## 2022-05-30 PROCEDURE — 25000003 PHARM REV CODE 250: Performed by: NURSE ANESTHETIST, CERTIFIED REGISTERED

## 2022-05-30 PROCEDURE — 52356 PR CYSTO/URETERO W/LITHOTRIPSY: ICD-10-PCS | Mod: RT,,, | Performed by: UROLOGY

## 2022-05-30 DEVICE — STENT URETERAL UNIV 6FR 26CM: Type: IMPLANTABLE DEVICE | Site: URETER | Status: FUNCTIONAL

## 2022-05-30 RX ORDER — FLUCONAZOLE 2 MG/ML
INJECTION, SOLUTION INTRAVENOUS
Status: DISCONTINUED | OUTPATIENT
Start: 2022-05-30 | End: 2022-05-30

## 2022-05-30 RX ORDER — ACETAMINOPHEN 10 MG/ML
INJECTION, SOLUTION INTRAVENOUS
Status: DISCONTINUED | OUTPATIENT
Start: 2022-05-30 | End: 2022-05-30

## 2022-05-30 RX ORDER — ROCURONIUM BROMIDE 10 MG/ML
INJECTION, SOLUTION INTRAVENOUS
Status: DISCONTINUED | OUTPATIENT
Start: 2022-05-30 | End: 2022-05-30

## 2022-05-30 RX ORDER — FLUCONAZOLE 2 MG/ML
INJECTION, SOLUTION INTRAVENOUS
Status: DISCONTINUED
Start: 2022-05-30 | End: 2022-05-30 | Stop reason: HOSPADM

## 2022-05-30 RX ORDER — PROPOFOL 10 MG/ML
VIAL (ML) INTRAVENOUS
Status: DISCONTINUED | OUTPATIENT
Start: 2022-05-30 | End: 2022-05-30

## 2022-05-30 RX ORDER — GENTAMICIN SULFATE 40 MG/ML
INJECTION, SOLUTION INTRAMUSCULAR; INTRAVENOUS
Status: DISCONTINUED
Start: 2022-05-30 | End: 2022-05-30 | Stop reason: WASHOUT

## 2022-05-30 RX ORDER — DEXAMETHASONE SODIUM PHOSPHATE 4 MG/ML
INJECTION, SOLUTION INTRA-ARTICULAR; INTRALESIONAL; INTRAMUSCULAR; INTRAVENOUS; SOFT TISSUE
Status: DISCONTINUED | OUTPATIENT
Start: 2022-05-30 | End: 2022-05-30

## 2022-05-30 RX ORDER — HALOPERIDOL 5 MG/ML
0.5 INJECTION INTRAMUSCULAR EVERY 10 MIN PRN
Status: DISCONTINUED | OUTPATIENT
Start: 2022-05-30 | End: 2022-05-30 | Stop reason: HOSPADM

## 2022-05-30 RX ORDER — ONDANSETRON 2 MG/ML
INJECTION INTRAMUSCULAR; INTRAVENOUS
Status: DISCONTINUED | OUTPATIENT
Start: 2022-05-30 | End: 2022-05-30

## 2022-05-30 RX ORDER — FENTANYL CITRATE 50 UG/ML
INJECTION, SOLUTION INTRAMUSCULAR; INTRAVENOUS
Status: DISCONTINUED | OUTPATIENT
Start: 2022-05-30 | End: 2022-05-30

## 2022-05-30 RX ORDER — SODIUM CHLORIDE 9 MG/ML
INJECTION, SOLUTION INTRAVENOUS CONTINUOUS
Status: DISCONTINUED | OUTPATIENT
Start: 2022-05-30 | End: 2022-05-30 | Stop reason: HOSPADM

## 2022-05-30 RX ORDER — FENTANYL CITRATE 50 UG/ML
25 INJECTION, SOLUTION INTRAMUSCULAR; INTRAVENOUS EVERY 5 MIN PRN
Status: DISCONTINUED | OUTPATIENT
Start: 2022-05-30 | End: 2022-05-30 | Stop reason: HOSPADM

## 2022-05-30 RX ORDER — LIDOCAINE HYDROCHLORIDE 10 MG/ML
1 INJECTION, SOLUTION EPIDURAL; INFILTRATION; INTRACAUDAL; PERINEURAL ONCE
Status: DISCONTINUED | OUTPATIENT
Start: 2022-05-30 | End: 2022-05-30 | Stop reason: HOSPADM

## 2022-05-30 RX ORDER — CEFAZOLIN SODIUM/WATER 2 G/20 ML
2 SYRINGE (ML) INTRAVENOUS
Status: COMPLETED | OUTPATIENT
Start: 2022-05-30 | End: 2022-05-30

## 2022-05-30 RX ORDER — EPHEDRINE SULFATE 50 MG/ML
INJECTION, SOLUTION INTRAVENOUS
Status: DISCONTINUED | OUTPATIENT
Start: 2022-05-30 | End: 2022-05-30

## 2022-05-30 RX ORDER — LIDOCAINE HYDROCHLORIDE 20 MG/ML
INJECTION INTRAVENOUS
Status: DISCONTINUED | OUTPATIENT
Start: 2022-05-30 | End: 2022-05-30

## 2022-05-30 RX ORDER — SODIUM CHLORIDE 0.9 % (FLUSH) 0.9 %
3 SYRINGE (ML) INJECTION
Status: DISCONTINUED | OUTPATIENT
Start: 2022-05-30 | End: 2022-05-30 | Stop reason: HOSPADM

## 2022-05-30 RX ADMIN — SODIUM CHLORIDE: 0.9 INJECTION, SOLUTION INTRAVENOUS at 08:05

## 2022-05-30 RX ADMIN — SUGAMMADEX 400 MG: 100 INJECTION, SOLUTION INTRAVENOUS at 09:05

## 2022-05-30 RX ADMIN — Medication 2 G: at 08:05

## 2022-05-30 RX ADMIN — FENTANYL CITRATE 50 MCG: 50 INJECTION, SOLUTION INTRAMUSCULAR; INTRAVENOUS at 08:05

## 2022-05-30 RX ADMIN — LIDOCAINE HYDROCHLORIDE 80 MG: 20 INJECTION, SOLUTION INTRAVENOUS at 08:05

## 2022-05-30 RX ADMIN — ACETAMINOPHEN 1000 MG: 10 INJECTION, SOLUTION INTRAVENOUS at 09:05

## 2022-05-30 RX ADMIN — FLUCONAZOLE 200 MG: 2 INJECTION, SOLUTION INTRAVENOUS at 09:05

## 2022-05-30 RX ADMIN — EPHEDRINE SULFATE 10 MG: 50 INJECTION INTRAVENOUS at 09:05

## 2022-05-30 RX ADMIN — ROCURONIUM BROMIDE 30 MG: 10 INJECTION, SOLUTION INTRAVENOUS at 08:05

## 2022-05-30 RX ADMIN — ONDANSETRON 4 MG: 2 INJECTION, SOLUTION INTRAMUSCULAR; INTRAVENOUS at 09:05

## 2022-05-30 RX ADMIN — PROPOFOL 100 MG: 10 INJECTION, EMULSION INTRAVENOUS at 08:05

## 2022-05-30 RX ADMIN — DEXAMETHASONE SODIUM PHOSPHATE 4 MG: 4 INJECTION, SOLUTION INTRAMUSCULAR; INTRAVENOUS at 09:05

## 2022-05-30 RX ADMIN — ROCURONIUM BROMIDE 10 MG: 10 INJECTION, SOLUTION INTRAVENOUS at 09:05

## 2022-05-30 NOTE — PLAN OF CARE
Discharge instructions reviewed with patient and daughters. Supplies given to collect stone fragments. Verbalized understanding and asked appropriate questions. Packet given to daughter.

## 2022-05-30 NOTE — INTERVAL H&P NOTE
The patient has been examined and the H&P has been reviewed:    I concur with the findings and changes have been noted since the H&P was written: s/p right stent placement early May.   Negative urine culture from 5/26/22. Presents today for definitive ureteroscopy.    Surgery risks, benefits and alternative options discussed and understood by patient/family.          There are no hospital problems to display for this patient.

## 2022-05-30 NOTE — ANESTHESIA PROCEDURE NOTES
Intubation    Date/Time: 5/30/2022 8:55 AM  Performed by: Cherry Marrufo CRNA  Authorized by: Freedom Scott MD     Intubation:     Induction:  Intravenous    Intubated:  Postinduction    Mask Ventilation:  Easy with oral airway    Attempts:  1    Attempted By:  CRNA    Method of Intubation:  Direct    Blade:  Trevino 2    Laryngeal View Grade: Grade I - full view of cords      Difficult Airway Encountered?: No      Complications:  None    Airway Device:  Oral endotracheal tube    Airway Device Size:  7.0    Style/Cuff Inflation:  Cuffed (inflated to minimal occlusive pressure)    Tube secured:  21    Secured at:  The lips    Placement Verified By:  Capnometry    Complicating Factors:  None    Findings Post-Intubation:  BS equal bilateral and atraumatic/condition of teeth unchanged

## 2022-05-30 NOTE — ANESTHESIA POSTPROCEDURE EVALUATION
Anesthesia Post Evaluation    Patient: Misa Barajas    Procedure(s) Performed: Procedure(s) (LRB):  CYSTOSCOPY  URETEROSCOPY (Right)  REMOVAL, STENT, URETER (Right)  REMOVAL, CALCULUS, URETER, URETEROSCOPIC (Right)  REPLACEMENT, STENT (Right)  LITHOTRIPSY, USING LASER    Final Anesthesia Type: general      Patient location during evaluation: PACU  Patient participation: Yes- Able to Participate  Level of consciousness: awake and alert and oriented  Post-procedure vital signs: reviewed and stable  Pain management: adequate  Airway patency: patent    PONV status at discharge: No PONV  Anesthetic complications: no      Cardiovascular status: blood pressure returned to baseline  Respiratory status: unassisted, room air and spontaneous ventilation  Hydration status: euvolemic  Follow-up not needed.          Vitals Value Taken Time   /66 05/30/22 0946   Temp 37 05/30/22 0948   Pulse 69 05/30/22 0948   Resp 12 05/30/22 0948   SpO2 99 % 05/30/22 0948   Vitals shown include unvalidated device data.      No case tracking events are documented in the log.      Pain/Neeraj Score: Neeraj Score: 6 (5/30/2022  9:31 AM)

## 2022-05-30 NOTE — OP NOTE
[facility]  Operative Note    Date: 05/30/2022    Pre-Op Diagnosis:   - right ureteral stone    Patient Active Problem List    Diagnosis Date Noted    Epigastric abdominal pain 05/12/2022    Right sided hydroureteronephrosis 05/04/2022    Chronic anticoagulation 05/04/2022    Right ureteral stone 04/28/2022    Lymphedema of both lower extremities 01/25/2022    Venous insufficiency of both lower extremities 01/25/2022    Venous stasis dermatitis of both lower extremities 01/25/2022    Chronic diastolic heart failure 12/05/2021    Chronic respiratory failure with hypoxia 06/26/2021    Centrilobular emphysema 06/22/2021    Paroxysmal atrial fibrillation 06/07/2021    Acute on chronic diastolic heart failure 12/24/2019    Varicose veins of both legs with edema 08/28/2019    Obesity (BMI 30.0-34.9) 08/22/2019    CKD (chronic kidney disease) stage 3, GFR 30-59 ml/min 02/14/2018    Breast calcification, right 07/20/2017    Osteopenia 07/10/2017    Calcified granuloma of lung 05/30/2017    RBBB 05/17/2017    Hyperlipidemia 11/20/2016    Aortic atherosclerosis 08/08/2016    Osteoarthritis of knee 01/14/2016    Renal osteodystrophy 01/14/2016    Arthritis of facet joints at multiple vertebral levels 07/24/2015    Essential hypertension 05/28/2015    Nephrolithiasis 06/02/2014       Post-Op Diagnosis: same    Procedure(s) Performed:   1.  Right ureteroscopy  2.  Cystoscopy  3.  Laser lithotripsy  4.  Stone basket extraction  5.  Fluoro < 1 h  6.  Right ureteral stent exchange    Specimen(s): Stone for analysis    Staff Surgeon: Bonnie Knutson MD    Assistant Surgeon: Wilder Alonzo MD; Albin Martinez MD     Anesthesia: General endotracheal anesthesia    Indications: Misa Barajas is a 76 y.o. female with a right 8 mm UVJ stone, presenting for definitive stone management.  She currently does have a JJ ureteral stent in place.      Findings:  - Stone encountered at mid ureter.  Fragmented with laser  - Fragments extracted with basket    Estimated Blood Loss: min    Drains: 6 Fr x 24 cm JJ ureteral stent with strings    Procedure in detail:  After informed consent was obtained, the patient was brought the the cystoscopy suite and placed in the supine position.  SCDs were applied and working.  Anesthesia was administered.  The patient was then placed in the dorsal lithotomy position and prepped and draped in the usual sterile fashion.      A rigid cystoscope in a 22 Fr sheath was introduced into the patient's urethra.  This passed easily.  The entire urethra was visualized which showed no strictures or masses.  Formal cystoscopy was performed which revealed no masses or lesions suspicious for malignancy, no bladder stones, no bladder diverticuli, no trabeculations.  The ureteral orifices were visualized in the normal anatomic position bilaterally.     A glide wire was passed up the right ureteral orifice and up into the kidney.  This passed easily and placement was confirmed using fluoro.  The cystoscope was removed keeping the wire in place.     A semirigid ureteroscope was passed into the patient's bladder alongside the wire under direct vision.  It was then passed through the right ureteral orifice alongside the wire.  A stone was encountered at the level of mid dureter.  A 272 micron laser fiber was passed through the ureteroscope.  The stone was fragmented using the laser.  A Nitinol tipless basket was introduced through the ureteroscope.  Stone fragments were removed and placed in the bladder.  The ureteroscope was reinserted up to the UPJ and the ureter was cleared. The ureteroscope was removed keeping the glide wire in place.  The entire course of the ureter was visualized as the ureteroscope was removed.  There were no significant ureteral fragments left behind.     A 6 Fr x 24 cm JJ ureteral stent with strings was passed over the wire and up into the renal pelvis using fluoro.   When the coil appeared to be in good position in the kidney, the wire was removed under continuous fluoro.  Good coils were seen in the kidney and the bladder using fluoro.      The cystoscope was reinserted and the bladder was drained and fragments removed.    The patient tolerated the procedure well and was transferred to the recovery room in stable condition.      Disposition:  The patient will follow up with Dr. Knutson in 3 months with a renal US.     Wilder Alonzo MD

## 2022-05-30 NOTE — DISCHARGE SUMMARY
[facility]  Discharge Note  Short Stay    Admit Date: 5/30/2022    Discharge Date and Time: 05/30/2022 8:55 AM      Attending Physician: Bonnie Knutson MD     Discharge Provider: Wilder Alonzo MD    Diagnoses:  Active Hospital Problems    Diagnosis  POA    *Nephrolithiasis [N20.0]  Yes      Resolved Hospital Problems   No resolved problems to display.       Discharged Condition: stable    Hospital Course: Patient was admitted for right ureteroscopy, stone extraction and tolerated the procedure well with no complications. She was discharged home in good condition on the same day.       Final Diagnoses: Same as principal problem.    Disposition: Home or Self Care    Follow up/Patient Instructions:    Medications:  Reconciled Home Medications:   Current Discharge Medication List      CONTINUE these medications which have NOT CHANGED    Details   acetaminophen (TYLENOL) 500 MG tablet Take 2 tablets (1,000 mg total) by mouth every 8 (eight) hours as needed for Pain.  Qty: 42 tablet, Refills: 0      apixaban (ELIQUIS) 5 mg Tab Take 5 mg by mouth once daily.      aspirin (ECOTRIN) 81 MG EC tablet Take 81 mg by mouth once daily.      biotin 1 mg tablet Take 1,000 mcg by mouth once daily.      budesonide-glycopyr-formoterol (BREZTRI AEROSPHERE) 160-9-4.8 mcg/actuation HFAA Inhale 2 puffs into the lungs 2 (two) times daily.  Qty: 32.1 g, Refills: 3    Associated Diagnoses: Asthma exacerbation in COPD; Asthma, unspecified asthma severity, unspecified whether complicated, unspecified whether persistent      busPIRone (BUSPAR) 5 MG Tab Take 1 tablet (5 mg total) by mouth 2 (two) times daily as needed (anxiety).  Qty: 60 tablet, Refills: 2    Associated Diagnoses: Anxiety      fish oil-omega-3 fatty acids 300-1,000 mg capsule Take 1 capsule by mouth once daily.      fluticasone propionate (FLONASE) 50 mcg/actuation nasal spray 1 spray by Each Nostril route daily as needed (congestion).      furosemide  (LASIX) 20 MG tablet Take 1 tablet by mouth daily If weight gain 2 to3 lbs for 2 to 5 days. If no improvement, call MD  Qty: 30 tablet, Refills: 6      losartan (COZAAR) 25 MG tablet Take 1 tablet (25 mg total) by mouth once daily.  Qty: 30 tablet, Refills: 0    Comments: .      metoprolol succinate (TOPROL-XL) 100 MG 24 hr tablet Take 1 tablet (100 mg total) by mouth once daily.  Qty: 30 tablet, Refills: 6    Comments: .      rosuvastatin (CRESTOR) 5 MG tablet Take 1 tablet (5 mg total) by mouth once daily.  Qty: 30 tablet, Refills: 0    Associated Diagnoses: Hyperlipidemia, unspecified hyperlipidemia type      vitamin D (VITAMIN D3) 1000 units Tab TAKE 2 TABLETS ONE TIME DAILY  Qty: 180 tablet, Refills: 0    Associated Diagnoses: Vitamin D insufficiency      inhalation spacing device Use as directed for inhalation.  Qty: 1 Device, Refills: 0      ondansetron (ZOFRAN) 4 MG tablet Take 1 tablet (4 mg total) by mouth every 8 (eight) hours as needed for Nausea.  Qty: 15 tablet, Refills: 0           Discharge Procedure Orders   US Kidney   Standing Status: Future Standing Exp. Date: 05/30/23     Order Specific Question Answer Comments   May the Radiologist modify the order per protocol to meet the clinical needs of the patient? Yes    Release to patient Immediate      Diet Adult Regular     Activity as tolerated      Follow-up Information     Bonnie Knutson MD Follow up in 3 month(s).    Specialty: Urology  Why: f/u stones  Contact information:  [facility] [id number] [phone]

## 2022-05-30 NOTE — TRANSFER OF CARE
"Anesthesia Transfer of Care Note    Patient: Misa Barajas    Procedure(s) Performed: Procedure(s) (LRB):  CYSTOSCOPY  URETEROSCOPY (Right)  REMOVAL, STENT, URETER (Right)  REMOVAL, CALCULUS, URETER, URETEROSCOPIC (Right)  REPLACEMENT, STENT (Right)  LITHOTRIPSY, USING LASER    Patient location: [facility]    Anesthesia Type: general    Transport from OR: Transported from OR on 6-10 L/min O2 by face mask with adequate spontaneous ventilation    Post pain: adequate analgesia    Post assessment: no apparent anesthetic complications and tolerated procedure well    Post vital signs: stable    Level of consciousness: sedated and responds to stimulation    Nausea/Vomiting: no nausea/vomiting    Complications: none    Transfer of care protocol was followed      Last vitals:   Visit Vitals  BP (!) 168/79 (BP Location: Right arm, Patient Position: Lying)   Pulse 76   Temp 37.1 °C (98.7 °F) (Temporal)   Resp 18   Ht 5' 4" (1.626 m)   Wt 68.5 kg (151 lb)   SpO2 99%   Breastfeeding No   BMI 25.92 kg/m²     "

## 2022-05-30 NOTE — ANESTHESIA PREPROCEDURE EVALUATION
05/30/2022  Misa Barajas is a 76 y.o., female.      Pre-op Assessment    I have reviewed the Patient Summary Reports.     I have reviewed the Nursing Notes. I have reviewed the NPO Status.   I have reviewed the Medications.     Review of Systems  Anesthesia Hx:  No problems with previous Anesthesia  History of prior surgery of interest to airway management or planning: Denies Family Hx of Anesthesia complications.   Denies Personal Hx of Anesthesia complications.   Social:  No Alcohol Use, Former Smoker    Hematology/Oncology:  Hematology Normal   Oncology Normal    --  Cancer in past history:  Breast left surgery and chemotherapy  Oncology Comments: Mastectomy & lymph nodes removed     EENT/Dental:  EENT/Dental Normal Reading glases   Cardiovascular:   Exercise tolerance: good Hypertension Dysrhythmias atrial fibrillation hyperlipidemia ECG has been reviewed. RBBB/ Phy. Therapy-2x/wk/Housework/Gardening   Pulmonary:   COPD, mild Shortness of breath Home Oxygen used, if needed   Renal/:   Chronic Renal Disease, CRI renal calculi CKD stage 3   Hepatic/GI:  Hepatic/GI Normal    Musculoskeletal:   Arthritis  Knees   Neurological:  Neurology Normal    Endocrine:  Endocrine Normal    Dermatological:  Skin Normal Eczema-bilateral legs   Psych:   anxiety      Anaya Arreaga RN  5/24/22    Physical Exam  General:  Well nourished, Cooperative, Alert and Oriented      Airway/Jaw/Neck:  Mouth Opening: Normal   Tongue: Normal   Mallampati: II TM Distance: Normal   Neck ROM: Normal ROM       Dental:  Intact, Caps / Implants     Chest/Lungs:  Chest/Lungs Clear    Heart/Vascular:  Heart Findings: Normal    Abdomen:  Abdomen Findings: Normal    Musculoskeletal:  Musculoskeletal Findings: Normal   Skin:  Skin Findings: Normal         Anesthesia Assessment: Preoperative EQUATION    Planned Procedure: Procedure(s)  (LRB):  CYSTOSCOPY (N/A)  URETEROSCOPY (Right)  REMOVAL, STENT, URETER (Right)  EXTRACTION - STONE (Right)  PLACEMENT-STENT (Right)  CYSTOLITHOLAPAXY (Right)  CYSTOURETEROSCOPY, WITH HOLMIUM LASER LITHOTRIPSY OF URETERAL CALCULUS AND STENT INSERTION (Right)  PYELOGRAM, RETROGRADE (Right)  Requested Anesthesia Type:General  Surgeon: Bonnie Knutson MD  Service: Urology  Known or anticipated Date of Surgery:5/30/2022    Surgeon notes: reviewed and Nephrolithiasis    Previous anesthesia records:4/28/22-Cystoscopy,Insertion,Stent,Ureter Pyelogram, Retrograde-Right-General -no apparent anesthetic complications and tolerated procedure well-no nausea/vomiting     Anesthesia Hx:  No problems with previous Anesthesia LEFT EXTREMITY RESTRICTED, LEFT MASTECTOMY History of prior surgery of interest to airway management or planning: Previous anesthesia: General 2002-LEFT MASTECTOMY with general anesthesia.  Denies Family Hx of Anesthesia complications.   Denies Personal Hx of Anesthesia complications.    Airway:  Mallampati: I   Mouth Opening: Normal  TM Distance: Normal  Tongue: Normal  Neck ROM: Normal ROM      Last PCP note: within 1 month , within [facility] , 5/16/22-Hospital discharge follow-Back pain/up +10 more Dx  Subspecialty notes: Cardiology: General, Nephrology, Optometry visit/ Pulmonology visit    Other important co-morbidities: HLD, HTN and Nephrolithiasis     Medical History    Diagnosis Date Comment Source   Aortic atherosclerosis      Arthritis  knee joint pain    Breast cancer 2002 left breast & lymph nodes-s/p sx with chemo    Bronchitis  with flu-2/2014    Cataracts, bilateral      Chronic diastolic heart failure 12/24/2019     Chronic kidney disease, stage 3      History of chemotherapy  last treatment 12/2002 (had 8 treatments)    Hyperlipidemia 11/20/2016     Hypertension      Osteoporosis      Renal calculi  hematuria    Vitamin D insufficiency          Tests already available:  Results have been  "reviewed. CXR & EKG--5/12/22/ Labs-5/20/22-BMP/ 5/13/22-Glucose x3/ 5/12/22-Glucose x2/Troponin 1/Troponin 1 #2/ CBC/CBC/Lipase, etc./ 5/9/22-CBC/CMP/ 5/16/22- 5/5/22/-CBC/CMP/ 5/3/22-Troponin 1/ 5/2/22-5/1/22/ 4/28/22      Plan: Phone pending     Testing:  None Needed      Patient  has previously scheduled Medical Appointment:Appt. on 5/27/22, prior to the surgery date.    Navigation: Phone Completed  .              Consults scheduled.Pending-Cards "Clearance" & Eliquis & baby ASA guidelines  from Dr. Carlos Davis-Request sent in-basket.              Results will be tracked by Preop Clinic.           Anaya Arreaga RN  5/24/22    Addendum:Received  Cards  "Clearance"  with  Eliquis & baby Aspirin pre-op guidelines from, Dr. Carlos Davis MD via  in-basket  note on 5/24/22:    Please hold aspirin 5 days prior to the surgery and hold eliquis 2 days prior to the surgery.     RCRI: 6% 30d risk of death, MI, or cardiac arrest HERNANDEZ:0.5% risk of MI or cardiac arrest intraoperatively or up to 30d post op    From my perspective she can proceed with the surgery as long as the surgeon and anesthesiologist are aware that she is not fully euvolemic and has heart failure with preserved ejection fraction and anesthesia should be used judiciously along with IV fluids.    Carlos Davis     Patient was notified of these pre-op med guideline for baby Aspirin & Eliquis.    Anaya Arreaga RN  5/25/22      Physical Exam  General: Well nourished, Cooperative, Alert and Oriented    Airway:  Mallampati: II   Mouth Opening: Normal  TM Distance: Normal  Tongue: Normal  Neck ROM: Normal ROM    Dental:  Intact, Caps / Implants          Anesthesia Plan  Type of Anesthesia, risks & benefits discussed:    Anesthesia Type: Gen ETT  Intra-op Monitoring Plan: Standard ASA Monitors  Post Op Pain Control Plan: multimodal analgesia and IV/PO Opioids PRN  Induction:  IV  Airway Plan: Direct, Post-Induction  ASA Score: 3  Day of " Surgery Review of History & Physical: H&P Update referred to the surgeon/provider.    Ready For Surgery From Anesthesia Perspective.       .

## 2022-05-30 NOTE — PATIENT INSTRUCTIONS
Post Cystoscopy Instructions  Do not strain to have a bowel movement  No strenuous exercise x 7 days  No driving while you are on narcotic pain medications or if your orosco  catheter is in place    You can expect:  To pass stone fragments if you had a stone procedure  Have pain when you void from your stent if you have a stent in place  See blood in your urine if you have a stent in place    You may remove your stent on Thursday 6/2/22.  /  If you have a catheter, please return to the ER if your catheter stops draining or you are having abdominal pain.    Call the doctor if:  Temperature is greater than 101F  Persistent vomiting and inability to keep food down  Inability to void if you do not have a catheter

## 2022-06-02 ENCOUNTER — TELEPHONE (OUTPATIENT)
Dept: UROLOGY | Facility: CLINIC | Age: 77
End: 2022-06-02
Payer: MEDICARE

## 2022-06-02 ENCOUNTER — CLINICAL SUPPORT (OUTPATIENT)
Dept: UROLOGY | Facility: CLINIC | Age: 77
End: 2022-06-02
Payer: MEDICARE

## 2022-06-02 ENCOUNTER — LAB VISIT (OUTPATIENT)
Dept: LAB | Facility: HOSPITAL | Age: 77
End: 2022-06-02
Payer: MEDICARE

## 2022-06-02 DIAGNOSIS — N20.0 NEPHROLITHIASIS: Primary | ICD-10-CM

## 2022-06-02 DIAGNOSIS — N20.0 NEPHROLITHIASIS: ICD-10-CM

## 2022-06-02 PROCEDURE — 99999 PR PBB SHADOW E&M-EST. PATIENT-LVL II: CPT | Mod: PBBFAC,,,

## 2022-06-02 PROCEDURE — 99999 PR PBB SHADOW E&M-EST. PATIENT-LVL II: ICD-10-PCS | Mod: PBBFAC,,,

## 2022-06-02 PROCEDURE — 82365 CALCULUS SPECTROSCOPY: CPT | Performed by: UROLOGY

## 2022-06-02 NOTE — TELEPHONE ENCOUNTER
Spoke to the patient. She stated that she could not find the string to her stent. She stated that she looked twice and she could not see it. I advised the patient to come in the office now so the staff can take a look. Patient verbally understood.    Martinez MA  ----- Message from Elma Mcgowan sent at 6/2/2022 11:45 AM CDT -----  Regarding: self  .Type: Patient Call Back    Who called: self     What is the request in detail: is having post op questions regarding surgery - today was supposed to be the last day for stents in but she cannot find the string to take it out. Please call     Can the clinic reply by [facility]?    Would the patient rather a call back or a response via [facility]? Call     Best call back number: .[phone]

## 2022-06-02 NOTE — PROGRESS NOTES
Stent with string was removed. Patient tolerated the removal well with no pain or discomfort. Patient was reminded to continue to increase her water intake. She was also reminded of her follow up appointment with ultrasound and kub. Patient was advised to call the office back if she has any other questions or concerns.    RADHA Martinez

## 2022-06-03 LAB
COMPN STONE: NORMAL
SPECIMEN SOURCE: NORMAL
STONE ANALYSIS IR-IMP: NORMAL

## 2022-06-07 LAB
COMPN STONE: NORMAL
SPECIMEN SOURCE: NORMAL
STONE ANALYSIS IR-IMP: NORMAL

## 2022-06-07 RX ORDER — LOSARTAN POTASSIUM 25 MG/1
25 TABLET ORAL DAILY
Qty: 30 TABLET | Refills: 0 | Status: SHIPPED | OUTPATIENT
Start: 2022-06-07 | End: 2022-07-12 | Stop reason: SDUPTHER

## 2022-06-07 NOTE — TELEPHONE ENCOUNTER
Care Due:                  Date            Visit Type   Department     Provider  --------------------------------------------------------------------------------                                EP -                              PRIMARY      [facility] INTERNAL  Last Visit: 05-      CARE ([facility])   MEDICINE       Celia Carlisle                              [facility]                              PRIMARY      [facility] INTERNAL  Next Visit: 08-      CARE ([facility])   BARB Carlisle                                                            Last  Test          Frequency    Reason                     Performed    Due Date  --------------------------------------------------------------------------------    Lipid Panel.  12 months..  rosuvastatin.............  06-   06-    [facility] Embedded Care Gaps. Reference number: [id number]. 6/07/2022   10:10:42 AM CDT

## 2022-06-07 NOTE — TELEPHONE ENCOUNTER
Refill Routing Note   Medication(s) are not appropriate for processing by [facility] for the following reason(s):      - Required laboratory values are outdated  - Required vitals are abnormal  - Patient has been seen in the ED/Hospital since the last PCP visit    [facility] action(s):  Defer Medication-related problems identified: Requires labs        Medication reconciliation completed: No     Appointments  past 12m or future 3m with PCP    Date Provider   Last Visit   Visit date not found Hebert Farmer DO   Next Visit   Visit date not found Hebert Farmer DO   ED visits in past 90 days: 0        Note composed:12:55 PM 06/07/2022

## 2022-06-08 ENCOUNTER — TELEPHONE (OUTPATIENT)
Dept: PULMONOLOGY | Facility: CLINIC | Age: 77
End: 2022-06-08
Payer: MEDICARE

## 2022-06-08 DIAGNOSIS — I50.9 CONGESTIVE HEART FAILURE, NYHA CLASS 1, UNSPECIFIED CONGESTIVE HEART FAILURE TYPE: Primary | ICD-10-CM

## 2022-06-08 RX ORDER — FUROSEMIDE 20 MG/1
TABLET ORAL
Qty: 30 TABLET | Refills: 6 | Status: SHIPPED | OUTPATIENT
Start: 2022-06-08 | End: 2022-06-24 | Stop reason: SDUPTHER

## 2022-06-08 RX ORDER — FUROSEMIDE 20 MG/1
TABLET ORAL
Qty: 30 TABLET | Refills: 6 | Status: CANCELLED | OUTPATIENT
Start: 2022-06-08

## 2022-06-08 NOTE — TELEPHONE ENCOUNTER
----- Message from Franck Rivera sent at 6/8/2022  9:44 AM CDT -----  Contact: @[phone]  Pt requesting a new prescription for medication (furosemide (LASIX) 20 MG tablet) Pt requesting the pharmacy below to used so the medication can be delivered.  Please call to discuss further.    [facility]  [phone]

## 2022-06-08 NOTE — TELEPHONE ENCOUNTER
Mrs Barajas is calling for a Lasix refill to the [facility] which delivers to her home. I told her it will be done by the end of the day. Denisse Lara LPN

## 2022-06-10 ENCOUNTER — OFFICE VISIT (OUTPATIENT)
Dept: CARDIOLOGY | Facility: CLINIC | Age: 77
End: 2022-06-10
Payer: MEDICARE

## 2022-06-10 VITALS
SYSTOLIC BLOOD PRESSURE: 170 MMHG | OXYGEN SATURATION: 93 % | BODY MASS INDEX: 28.42 KG/M2 | DIASTOLIC BLOOD PRESSURE: 81 MMHG | WEIGHT: 166.44 LBS | HEIGHT: 64 IN | HEART RATE: 74 BPM

## 2022-06-10 DIAGNOSIS — I48.0 PAROXYSMAL ATRIAL FIBRILLATION: ICD-10-CM

## 2022-06-10 DIAGNOSIS — I50.33 ACUTE ON CHRONIC DIASTOLIC HEART FAILURE: Primary | ICD-10-CM

## 2022-06-10 DIAGNOSIS — I10 ESSENTIAL HYPERTENSION: ICD-10-CM

## 2022-06-10 DIAGNOSIS — E78.2 MIXED HYPERLIPIDEMIA: ICD-10-CM

## 2022-06-10 PROCEDURE — 99999 PR PBB SHADOW E&M-EST. PATIENT-LVL V: CPT | Mod: PBBFAC,GC,, | Performed by: STUDENT IN AN ORGANIZED HEALTH CARE EDUCATION/TRAINING PROGRAM

## 2022-06-10 PROCEDURE — 99215 PR OFFICE/OUTPT VISIT, EST, LEVL V, 40-54 MIN: ICD-10-PCS | Mod: GC,S$GLB,, | Performed by: STUDENT IN AN ORGANIZED HEALTH CARE EDUCATION/TRAINING PROGRAM

## 2022-06-10 PROCEDURE — 99215 OFFICE O/P EST HI 40 MIN: CPT | Mod: GC,S$GLB,, | Performed by: STUDENT IN AN ORGANIZED HEALTH CARE EDUCATION/TRAINING PROGRAM

## 2022-06-10 PROCEDURE — 99999 PR PBB SHADOW E&M-EST. PATIENT-LVL V: ICD-10-PCS | Mod: PBBFAC,GC,, | Performed by: STUDENT IN AN ORGANIZED HEALTH CARE EDUCATION/TRAINING PROGRAM

## 2022-06-10 NOTE — PROGRESS NOTES
Cardiology Clinic Note  Reason for Visit: follow up    HPI:   76 y.o. with a past medical history of COPD, HFpEF (60% on 5/4/22), pAF (eliquis), HTN, HLD presenting to clinic for follow up    Last seen by me on 5/20 for a hospital follow up.  At that time she was volume overloaded clinically.  She was on lasix 20mg qD and we had a discussion whether to increase her lasix or wait until she was to have a kidney stone removed.  She decided to wait until she had a kidney stone removed and then discuss her lasix dosing.  Today she has no complaints.  Tolerated her kidney stone removal without issues.  She says she feels much better.  Still having LE edema.  No orthopnea.  Went shopping by herself yesterday and felt tired afterward but no SOB with exertion she says.  No chest pain with exertion either.  Last creatinine was 1.4.    ROS:    Constitution: Negative for fever, chills, weight loss or gain.   HENT: Negative for sore throat, rhinorrhea, or headache.  Eyes: Negative for blurred or double vision.   Cardiovascular: See above  Pulmonary: Negative for SOB   Gastrointestinal: Negative for abdominal pain, nausea, vomiting, or diarrhea.   : Negative for dysuria.   Neurological: Negative for focal weakness or sensory changes.  PMH:     Past Medical History:   Diagnosis Date    Acute hypoxemic respiratory failure 6/26/2021    Acute superficial venous thrombosis of lower extremity, bilateral 1/25/2022    Aortic atherosclerosis     Arthritis     knee joint pain    Breast cancer 2002    left breast & lymph nodes-s/p sx with chemo    Bronchitis     with flu-2/2014    Cataracts, bilateral     Chronic anticoagulation 5/4/2022    Chronic diastolic heart failure 12/24/2019    Chronic kidney disease, stage 3     History of chemotherapy     last treatment 12/2002 (had 8 treatments)    Hyperlipidemia 11/20/2016    Hypertension     Lymphedema of both lower extremities 1/25/2022    Nephrolithiasis 6/2/2014     Osteoporosis     Paroxysmal atrial fibrillation 6/7/2021    Renal calculi     hematuria    Renal osteodystrophy 1/14/2016    Venous stasis dermatitis of both lower extremities 1/25/2022    Vitamin D insufficiency      Past Surgical History:   Procedure Laterality Date    BREAST BIOPSY Left 2002    core bx, +    BREAST BIOPSY Right 2018    core    BREAST BIOPSY Right 2019    BREAST LUMPECTOMY Left 2002    CYSTOSCOPY  4/28/2022    Procedure: CYSTOSCOPY;  Surgeon: Vik Ware MD;  Location: [facility] OR 1ST FLR;  Service: Urology;;    CYSTOSCOPY  5/30/2022    Procedure: CYSTOSCOPY;  Surgeon: Bonnie Knutson MD;  Location: [facility] OR 1ST FLR;  Service: Urology;;    LASER LITHOTRIPSY  5/30/2022    Procedure: LITHOTRIPSY, USING LASER;  Surgeon: Bonnie Knutson MD;  Location: [facility] OR 1ST FLR;  Service: Urology;;    LITHOTRIPSY      MASTECTOMY Left 06/2002    left-& lymph node dissection    REPLACEMENT OF STENT Right 5/30/2022    Procedure: REPLACEMENT, STENT;  Surgeon: Bonnie Knutson MD;  Location: [facility] OR 1ST FLR;  Service: Urology;  Laterality: Right;    RETROGRADE PYELOGRAPHY Right 4/28/2022    Procedure: PYELOGRAM, RETROGRADE;  Surgeon: Vik Ware MD;  Location: [facility] OR 1ST FLR;  Service: Urology;  Laterality: Right;    ROBOT-ASSISTED LAPAROSCOPIC PARTIAL NEPHRECTOMY USING DA JESÚS XI Right 3/11/2021    Procedure: XI ROBOTIC NEPHRECTOMY, PARTIAL;  Surgeon: Taz Ortega MD;  Location: [facility] OR 2ND FLR;  Service: Urology;  Laterality: Right;  4hr/ gen with regional  [facility] confirmation:  [id number] for 11:15am case [place]    URETEROSCOPIC REMOVAL OF URETERIC CALCULUS Right 5/30/2022    Procedure: REMOVAL, CALCULUS, URETER, URETEROSCOPIC;  Surgeon: Bonnie Knutson MD;  Location: [facility] OR 1ST FLR;  Service: Urology;  Laterality: Right;    ureteroscopy Bilateral     6.14    URETEROSCOPY Right 5/30/2022    Procedure: URETEROSCOPY;  Surgeon: Bonnie Knutson MD;   [facility]: [facility] OR [address] [place] [address];  Service: Urology;  Laterality: Right;     Allergies:     Review of patient's allergies indicates:   Allergen Reactions    Adhesive Rash     Pt states she removed a LATEX bandaid and the skin beneath was swollen and red. No other latex causes a reaction.     Medications:     Current Outpatient Medications on File Prior to Visit   Medication Sig Dispense Refill    acetaminophen (TYLENOL) 500 MG tablet Take 2 tablets (1,000 mg total) by mouth every 8 (eight) hours as needed for Pain. 42 tablet 0    apixaban (ELIQUIS) 5 mg Tab Take 5 mg by mouth once daily.      aspirin (ECOTRIN) 81 MG EC tablet Take 81 mg by mouth once daily.      biotin 1 mg tablet Take 1,000 mcg by mouth once daily.      budesonide-glycopyr-formoterol (BREZTRI AEROSPHERE) 160-9-4.8 mcg/actuation HFAA Inhale 2 puffs into the lungs 2 (two) times daily. 32.1 g 3    busPIRone (BUSPAR) 5 MG Tab Take 1 tablet (5 mg total) by mouth 2 (two) times daily as needed (anxiety). 60 tablet 2    fish oil-omega-3 fatty acids 300-1,000 mg capsule Take 1 capsule by mouth once daily.      fluticasone propionate (FLONASE) 50 mcg/actuation nasal spray 1 spray by Each Nostril route daily as needed (congestion).      furosemide (LASIX) 20 MG tablet Take 1 tablet by mouth daily If weight gain 2 to 3 lbs for 2 to 5 days. If no improvement, call MD 30 tablet 6    inhalation spacing device Use as directed for inhalation. 1 Device 0    losartan (COZAAR) 25 MG tablet Take 1 tablet (25 mg total) by mouth once daily. 30 tablet 0    metoprolol succinate (TOPROL-XL) 100 MG 24 hr tablet Take 1 tablet (100 mg total) by mouth once daily. 30 tablet 6    ondansetron (ZOFRAN) 4 MG tablet Take 1 tablet (4 mg total) by mouth every 8 (eight) hours as needed for Nausea. 15 tablet 0    rosuvastatin (CRESTOR) 5 MG tablet Take 1 tablet (5 mg total) by mouth once daily. 30 tablet 0    vitamin D (VITAMIN D3) 1000 units Tab TAKE 2 TABLETS ONE TIME DAILY  180 tablet 0     No current facility-administered medications on file prior to visit.     Social History:     Social History     Tobacco Use    Smoking status: Former Smoker     Packs/day: 1.00     Years: 50.00     Pack years: 50.00     Types: Cigarettes     Start date:      Quit date: 2009     Years since quittin.0    Smokeless tobacco: Never Used   Substance Use Topics    Alcohol use: No     Family History:     Family History   Problem Relation Age of Onset    Bone cancer Mother     Breast cancer Mother     Arthritis Mother         Breast Cancer    Breast cancer Sister     Cancer Father         Asbestos cancer    No Known Problems Brother     Fibroids Daughter     Crohn's disease Daughter     No Known Problems Daughter     Arthritis Sister         Breast Cancer    Anesthesia problems Neg Hx     Glaucoma Neg Hx      Physical Exam:   There were no vitals taken for this visit.   Wt Readings from Last 4 Encounters:   22 68.5 kg (151 lb)   22 73.9 kg (162 lb 14.7 oz)   22 73.4 kg (161 lb 13.1 oz)   22 71.8 kg (158 lb 4.6 oz)     Constitutional: No distress, obese, conversant  HEENT: Sclera anicteric, PERRLA, EOMI  Neck: JVD at 8, no masses, good movement  CV: RRR, S1 and S2 normal, no additional heart sounds or murmurs. Pulses 2+ and equal bilaterally in radial arteries and DP  Pulm: Clear to auscultation bilaterally with symmetrical expansion. Chest wall palpated for reproduction of pain symptoms  GI: Abdomen soft, non-tender, good bowel sounds  Extremities: Both extremities intact and grossly normal, skin is warm, 1+ pitting edema in Le bilaterally  Skin: No ecchymosis, erythema, or ulcers  Psych: AOx3, appropriate affect  Neuro: CNII-XII intact, no focal deficits    Labs:     Lab Results   Component Value Date     2022    K 4.4 2022     2022    CO2 27 2022    BUN 24 (H) 2022    CREATININE 1.4 2022    ANIONGAP 9  05/20/2022     No results found for: HGBA1C  Lab Results   Component Value Date     (H) 05/12/2022     (H) 06/25/2021     (H) 06/23/2021    Lab Results   Component Value Date    WBC 9.20 05/12/2022    HGB 11.3 (L) 05/12/2022    HCT 34.8 (L) 05/12/2022     05/12/2022    GRAN 6.3 05/12/2022    GRAN 67.9 05/12/2022     Lab Results   Component Value Date    CHOL 140 06/14/2021    HDL 65 06/14/2021    LDLCALC 49.4 (L) 06/14/2021    TRIG 128 06/14/2021          Imaging:     EF   Date Value Ref Range Status   05/04/2022 60 % Final   06/09/2021 65 % Final     TTE:   5/4/2022:  · The left ventricle is normal in size with normal systolic function.  · The estimated ejection fraction is 60%.  · There are segmental left ventricular wall motion abnormalities.  · There is abnormal septal wall motion.  · Indeterminate left ventricular diastolic function.  · Normal right ventricular size with normal right ventricular systolic function.  · Mild mitral regurgitation.  · Mild tricuspid regurgitation.  · Intermediate central venous pressure (8 mmHg).  · The estimated PA systolic pressure is 31 mmHg.     Stress Test:  Nuclear stress test 5/13/2022:    Equivocal myocardial perfusion scan.    There is a mild to moderate intensity, small to moderate sized, equivocal perfusion abnormality that is fixed in the inferior and inferolateral wall(s). This finding is equivocal due to bowel interference.    There are no other significant perfusion abnormalities.    The visually estimated ejection fraction is normal at rest and normal during stress.    There is normal wall motion at rest and post stress.    LV cavity size is normal at rest and normal at stress.    The EKG portion of this study is negative for ischemia.    The patient reported no chest pain during the stress test.    There were no arrhythmias during stress.    When compared to the previous study from 4/15/2021, the inferior wall defect is now  present on stress and rest images. There is underlying bowel which may be interfering.  Assessment:     76 y.o. with a past medical history of COPD, HFpEF (60% on 5/4/22), pAF (eliquis), HTN, HLD presenting to clinic for follow up      Plan:     #HFpEF:  She is not decompensated however does have some pitting edema and minor JVD.  Minor crackles in her lower lobes  - increase lasix to 40mg qD with repeat BMP in 1 week  - losartan 25mg qD, metoprolol succ 100mg qD     #pAFib:  CV 6, HB 1  - AC with eliquis 5mg BID  - continue rate control with metoprolol succ 100mg qD     #HTN:  BP in clinic today was 97/71.  Received home health and BP at home in the 120s systolic  - continue losartan 25mg qD, metoprolol, and lasix 20mg qD  - keep a BP diary for next visit     #HLD:  Last lipid panel in 6/2021: chol 140, tri 128, HDL 65, LDL 49  - continue rosuvastatin 5mg qD  - told her to contact her PCP for repeat lipid panel and a1c    Signed:  Carlos Davis MD  Cardiology Fellow  Pager - [phone]  [facility]  6/10/2022 4:16 PM

## 2022-06-10 NOTE — PROGRESS NOTES
I have reviewed the patient's chart and the fellow's history and physical, as well as discussed the case with the fellow. I have personally spoken with and examined the patient in the clinic. I agree with the findings, assessment, and plan.  Patient is borderline fluid overloaded.  However, she has well documented bilateral venous insufficiency which may be responsible for most of her edema.  Will see what kind of response she has to increased diuretic.

## 2022-06-13 ENCOUNTER — DOCUMENT SCAN (OUTPATIENT)
Dept: HOME HEALTH SERVICES | Facility: HOSPITAL | Age: 77
End: 2022-06-13
Payer: MEDICARE

## 2022-06-13 ENCOUNTER — DOCUMENT SCAN (OUTPATIENT)
Dept: HOME HEALTH SERVICES | Facility: HOSPITAL | Age: 77
End: 2022-06-13

## 2022-06-13 ENCOUNTER — EXTERNAL HOME HEALTH (OUTPATIENT)
Dept: HOME HEALTH SERVICES | Facility: HOSPITAL | Age: 77
End: 2022-06-13
Payer: MEDICARE

## 2022-06-14 ENCOUNTER — OUTPATIENT CASE MANAGEMENT (OUTPATIENT)
Dept: ADMINISTRATIVE | Facility: OTHER | Age: 77
End: 2022-06-14
Payer: MEDICARE

## 2022-06-14 NOTE — PROGRESS NOTES
06/14/22-Called and spoke to patient who declines [facility] at this time. Will mail RN contact info and [facility] brochure. [facility] Case Closure.

## 2022-06-17 ENCOUNTER — LAB VISIT (OUTPATIENT)
Dept: LAB | Facility: HOSPITAL | Age: 77
End: 2022-06-17
Attending: INTERNAL MEDICINE
Payer: MEDICARE

## 2022-06-17 ENCOUNTER — TELEPHONE (OUTPATIENT)
Dept: CARDIOLOGY | Facility: CLINIC | Age: 77
End: 2022-06-17
Payer: MEDICARE

## 2022-06-17 DIAGNOSIS — N18.4 CHRONIC KIDNEY DISEASE, STAGE IV (SEVERE): ICD-10-CM

## 2022-06-17 DIAGNOSIS — I50.33 ACUTE ON CHRONIC DIASTOLIC HEART FAILURE: ICD-10-CM

## 2022-06-17 LAB
ANION GAP SERPL CALC-SCNC: 9 MMOL/L (ref 8–16)
ANION GAP SERPL CALC-SCNC: 9 MMOL/L (ref 8–16)
BUN SERPL-MCNC: 31 MG/DL (ref 8–23)
BUN SERPL-MCNC: 31 MG/DL (ref 8–23)
CALCIUM SERPL-MCNC: 9.9 MG/DL (ref 8.7–10.5)
CALCIUM SERPL-MCNC: 9.9 MG/DL (ref 8.7–10.5)
CHLORIDE SERPL-SCNC: 108 MMOL/L (ref 95–110)
CHLORIDE SERPL-SCNC: 108 MMOL/L (ref 95–110)
CO2 SERPL-SCNC: 25 MMOL/L (ref 23–29)
CO2 SERPL-SCNC: 25 MMOL/L (ref 23–29)
CREAT SERPL-MCNC: 1.5 MG/DL (ref 0.5–1.4)
CREAT SERPL-MCNC: 1.5 MG/DL (ref 0.5–1.4)
EST. GFR  (AFRICAN AMERICAN): 38.2 ML/MIN/1.73 M^2
EST. GFR  (AFRICAN AMERICAN): 38.2 ML/MIN/1.73 M^2
EST. GFR  (NON AFRICAN AMERICAN): 33.1 ML/MIN/1.73 M^2
EST. GFR  (NON AFRICAN AMERICAN): 33.1 ML/MIN/1.73 M^2
GLUCOSE SERPL-MCNC: 101 MG/DL (ref 70–110)
GLUCOSE SERPL-MCNC: 101 MG/DL (ref 70–110)
POTASSIUM SERPL-SCNC: 4 MMOL/L (ref 3.5–5.1)
POTASSIUM SERPL-SCNC: 4 MMOL/L (ref 3.5–5.1)
SODIUM SERPL-SCNC: 142 MMOL/L (ref 136–145)
SODIUM SERPL-SCNC: 142 MMOL/L (ref 136–145)

## 2022-06-17 PROCEDURE — 36415 COLL VENOUS BLD VENIPUNCTURE: CPT | Performed by: INTERNAL MEDICINE

## 2022-06-17 PROCEDURE — 80048 BASIC METABOLIC PNL TOTAL CA: CPT | Performed by: INTERNAL MEDICINE

## 2022-06-17 NOTE — TELEPHONE ENCOUNTER
Called pt to inform her that her Cr is relatively unchanged since last appointment.  To continue lasix.  Has f/u in our clinic in 2 weeks and can re-evaluate at that time her need for lasix.  All questions answered    Carlos Davis, PGY4  Cardiovascular Disease  [facility]

## 2022-06-24 ENCOUNTER — PATIENT MESSAGE (OUTPATIENT)
Dept: CARDIOLOGY | Facility: CLINIC | Age: 77
End: 2022-06-24
Payer: MEDICARE

## 2022-06-24 RX ORDER — FUROSEMIDE 20 MG/1
TABLET ORAL
Qty: 30 TABLET | Refills: 6 | Status: CANCELLED | OUTPATIENT
Start: 2022-06-24

## 2022-06-24 RX ORDER — FUROSEMIDE 20 MG/1
TABLET ORAL
Qty: 30 TABLET | Refills: 6 | Status: SHIPPED | OUTPATIENT
Start: 2022-06-24 | End: 2022-08-22

## 2022-06-24 NOTE — TELEPHONE ENCOUNTER
No new care gaps identified.  [facility] Embedded Care Gaps. Reference number: [id number]. 6/24/2022   10:57:55 AM KELSIET

## 2022-06-24 NOTE — TELEPHONE ENCOUNTER
----- Message from Sadia Muñoz sent at 6/24/2022 10:17 AM CDT -----  Contact: [phone] Patient  Requesting an RX refill or new RX.  Is this a refill or new RX: new  RX name and strength (copy/paste from chart):  furosemide (LASIX) 20 MG tablet  Is this a 30 day or 90 day RX:   Pharmacy name and phone # (copy/paste from chart):      [facility]  [address]  [place] [address]  Phone: [phone] Fax: [phone]     Pt states her Rx directions were changed by her cardiologist to 2 tablets per day

## 2022-06-30 ENCOUNTER — DOCUMENT SCAN (OUTPATIENT)
Dept: HOME HEALTH SERVICES | Facility: HOSPITAL | Age: 77
End: 2022-06-30
Payer: MEDICARE

## 2022-07-01 ENCOUNTER — DOCUMENT SCAN (OUTPATIENT)
Dept: HOME HEALTH SERVICES | Facility: HOSPITAL | Age: 77
End: 2022-07-01
Payer: MEDICARE

## 2022-07-11 ENCOUNTER — PATIENT MESSAGE (OUTPATIENT)
Dept: INTERNAL MEDICINE | Facility: CLINIC | Age: 77
End: 2022-07-11
Payer: MEDICARE

## 2022-07-11 RX ORDER — LOSARTAN POTASSIUM 25 MG/1
25 TABLET ORAL DAILY
Qty: 90 TABLET | Refills: 0 | Status: CANCELLED | OUTPATIENT
Start: 2022-07-11

## 2022-07-12 NOTE — TELEPHONE ENCOUNTER
No new care gaps identified.  [facility] Embedded Care Gaps. Reference number: [id number]. 7/12/2022   4:43:36 PM CDT

## 2022-07-13 RX ORDER — LOSARTAN POTASSIUM 25 MG/1
25 TABLET ORAL DAILY
Qty: 30 TABLET | Refills: 0 | Status: SHIPPED | OUTPATIENT
Start: 2022-07-13 | End: 2022-08-10 | Stop reason: SDUPTHER

## 2022-07-19 ENCOUNTER — PATIENT MESSAGE (OUTPATIENT)
Dept: RESEARCH | Facility: CLINIC | Age: 77
End: 2022-07-19
Payer: MEDICARE

## 2022-07-20 RX ORDER — METOPROLOL SUCCINATE 100 MG/1
100 TABLET, EXTENDED RELEASE ORAL DAILY
Qty: 30 TABLET | Refills: 6 | Status: SHIPPED | OUTPATIENT
Start: 2022-07-20 | End: 2022-08-22 | Stop reason: SDUPTHER

## 2022-07-21 ENCOUNTER — DOCUMENT SCAN (OUTPATIENT)
Dept: HOME HEALTH SERVICES | Facility: HOSPITAL | Age: 77
End: 2022-07-21
Payer: MEDICARE

## 2022-08-01 DIAGNOSIS — E78.5 HYPERLIPIDEMIA, UNSPECIFIED HYPERLIPIDEMIA TYPE: ICD-10-CM

## 2022-08-01 RX ORDER — ROSUVASTATIN CALCIUM 5 MG/1
5 TABLET, COATED ORAL DAILY
Qty: 90 TABLET | Refills: 0 | Status: SHIPPED | OUTPATIENT
Start: 2022-08-01 | End: 2022-08-22 | Stop reason: SDUPTHER

## 2022-08-01 NOTE — TELEPHONE ENCOUNTER
No new care gaps identified.  [facility] Embedded Care Gaps. Reference number: [id number]. 8/01/2022   3:53:02 PM CDT

## 2022-08-01 NOTE — TELEPHONE ENCOUNTER
----- Message from Christy Michelle sent at 8/1/2022  2:31 PM CDT -----  Type:  Needs Medical Advice    Who Called: self  Reason:refill on rosuvastatin (CRESTOR) 5 MG tablet  Would the patient rather a call back or a response via [facility]? Call   Best Call Back Number:[phone]  Additional Information:[facility]   Phone:  [phone]  Fax:  [phone]

## 2022-08-01 NOTE — TELEPHONE ENCOUNTER
Refill Routing Note   Medication(s) are not appropriate for processing by [facility] for the following reason(s):      - Required laboratory values are outdated    [facility] action(s):  Defer          Medication reconciliation completed: No     Appointments  past 12m or future 3m with PCP    Date Provider   Last Visit   5/16/2022 Celia Carlisle MD   Next Visit   8/22/2022 Celia Carlisle MD   ED visits in past 90 days: 0        Note composed:3:11 PM 08/01/2022

## 2022-08-01 NOTE — TELEPHONE ENCOUNTER
No new care gaps identified.  [facility] Embedded Care Gaps. Reference number: [id number]. 8/01/2022   2:28:41 PM CDT

## 2022-08-02 RX ORDER — ROSUVASTATIN CALCIUM 5 MG/1
5 TABLET, COATED ORAL DAILY
Qty: 90 TABLET | Refills: 0 | Status: SHIPPED | OUTPATIENT
Start: 2022-08-02 | End: 2022-08-22

## 2022-08-05 ENCOUNTER — OFFICE VISIT (OUTPATIENT)
Dept: CARDIOLOGY | Facility: CLINIC | Age: 77
End: 2022-08-05
Payer: MEDICARE

## 2022-08-05 VITALS
DIASTOLIC BLOOD PRESSURE: 60 MMHG | HEIGHT: 64 IN | SYSTOLIC BLOOD PRESSURE: 126 MMHG | BODY MASS INDEX: 28.68 KG/M2 | HEART RATE: 61 BPM | WEIGHT: 168 LBS

## 2022-08-05 DIAGNOSIS — Z79.01 CHRONIC ANTICOAGULATION: ICD-10-CM

## 2022-08-05 DIAGNOSIS — N18.30 STAGE 3 CHRONIC KIDNEY DISEASE, UNSPECIFIED WHETHER STAGE 3A OR 3B CKD: ICD-10-CM

## 2022-08-05 DIAGNOSIS — I70.0 AORTIC ATHEROSCLEROSIS: ICD-10-CM

## 2022-08-05 DIAGNOSIS — I50.32 CHRONIC DIASTOLIC HEART FAILURE: Primary | ICD-10-CM

## 2022-08-05 DIAGNOSIS — I10 ESSENTIAL HYPERTENSION: ICD-10-CM

## 2022-08-05 PROCEDURE — 99214 OFFICE O/P EST MOD 30 MIN: CPT | Mod: GC,S$GLB,, | Performed by: STUDENT IN AN ORGANIZED HEALTH CARE EDUCATION/TRAINING PROGRAM

## 2022-08-05 PROCEDURE — 1159F PR MEDICATION LIST DOCUMENTED IN MEDICAL RECORD: ICD-10-PCS | Mod: CPTII,GC,S$GLB, | Performed by: STUDENT IN AN ORGANIZED HEALTH CARE EDUCATION/TRAINING PROGRAM

## 2022-08-05 PROCEDURE — 99214 PR OFFICE/OUTPT VISIT, EST, LEVL IV, 30-39 MIN: ICD-10-PCS | Mod: GC,S$GLB,, | Performed by: STUDENT IN AN ORGANIZED HEALTH CARE EDUCATION/TRAINING PROGRAM

## 2022-08-05 PROCEDURE — 99999 PR PBB SHADOW E&M-EST. PATIENT-LVL V: CPT | Mod: PBBFAC,GC,, | Performed by: STUDENT IN AN ORGANIZED HEALTH CARE EDUCATION/TRAINING PROGRAM

## 2022-08-05 PROCEDURE — 3288F PR FALLS RISK ASSESSMENT DOCUMENTED: ICD-10-PCS | Mod: CPTII,GC,S$GLB, | Performed by: STUDENT IN AN ORGANIZED HEALTH CARE EDUCATION/TRAINING PROGRAM

## 2022-08-05 PROCEDURE — 1159F MED LIST DOCD IN RCRD: CPT | Mod: CPTII,GC,S$GLB, | Performed by: STUDENT IN AN ORGANIZED HEALTH CARE EDUCATION/TRAINING PROGRAM

## 2022-08-05 PROCEDURE — 1160F RVW MEDS BY RX/DR IN RCRD: CPT | Mod: CPTII,GC,S$GLB, | Performed by: STUDENT IN AN ORGANIZED HEALTH CARE EDUCATION/TRAINING PROGRAM

## 2022-08-05 PROCEDURE — 1126F AMNT PAIN NOTED NONE PRSNT: CPT | Mod: CPTII,GC,S$GLB, | Performed by: STUDENT IN AN ORGANIZED HEALTH CARE EDUCATION/TRAINING PROGRAM

## 2022-08-05 PROCEDURE — 3078F PR MOST RECENT DIASTOLIC BLOOD PRESSURE < 80 MM HG: ICD-10-PCS | Mod: CPTII,GC,S$GLB, | Performed by: STUDENT IN AN ORGANIZED HEALTH CARE EDUCATION/TRAINING PROGRAM

## 2022-08-05 PROCEDURE — 99999 PR PBB SHADOW E&M-EST. PATIENT-LVL V: ICD-10-PCS | Mod: PBBFAC,GC,, | Performed by: STUDENT IN AN ORGANIZED HEALTH CARE EDUCATION/TRAINING PROGRAM

## 2022-08-05 PROCEDURE — 3288F FALL RISK ASSESSMENT DOCD: CPT | Mod: CPTII,GC,S$GLB, | Performed by: STUDENT IN AN ORGANIZED HEALTH CARE EDUCATION/TRAINING PROGRAM

## 2022-08-05 PROCEDURE — 1101F PT FALLS ASSESS-DOCD LE1/YR: CPT | Mod: CPTII,GC,S$GLB, | Performed by: STUDENT IN AN ORGANIZED HEALTH CARE EDUCATION/TRAINING PROGRAM

## 2022-08-05 PROCEDURE — 1160F PR REVIEW ALL MEDS BY PRESCRIBER/CLIN PHARMACIST DOCUMENTED: ICD-10-PCS | Mod: CPTII,GC,S$GLB, | Performed by: STUDENT IN AN ORGANIZED HEALTH CARE EDUCATION/TRAINING PROGRAM

## 2022-08-05 PROCEDURE — 1101F PR PT FALLS ASSESS DOC 0-1 FALLS W/OUT INJ PAST YR: ICD-10-PCS | Mod: CPTII,GC,S$GLB, | Performed by: STUDENT IN AN ORGANIZED HEALTH CARE EDUCATION/TRAINING PROGRAM

## 2022-08-05 PROCEDURE — 1126F PR PAIN SEVERITY QUANTIFIED, NO PAIN PRESENT: ICD-10-PCS | Mod: CPTII,GC,S$GLB, | Performed by: STUDENT IN AN ORGANIZED HEALTH CARE EDUCATION/TRAINING PROGRAM

## 2022-08-05 PROCEDURE — 3074F PR MOST RECENT SYSTOLIC BLOOD PRESSURE < 130 MM HG: ICD-10-PCS | Mod: CPTII,GC,S$GLB, | Performed by: STUDENT IN AN ORGANIZED HEALTH CARE EDUCATION/TRAINING PROGRAM

## 2022-08-05 PROCEDURE — 3074F SYST BP LT 130 MM HG: CPT | Mod: CPTII,GC,S$GLB, | Performed by: STUDENT IN AN ORGANIZED HEALTH CARE EDUCATION/TRAINING PROGRAM

## 2022-08-05 PROCEDURE — 3078F DIAST BP <80 MM HG: CPT | Mod: CPTII,GC,S$GLB, | Performed by: STUDENT IN AN ORGANIZED HEALTH CARE EDUCATION/TRAINING PROGRAM

## 2022-08-05 NOTE — PROGRESS NOTES
Cardiology Clinic Note  Reason for Visit: follow up    HPI:   76 y.o. with a past medical history of COPD, HFpEF (60% on 5/4/22), pAF (eliquis), HTN, HLD presenting to clinic for follow up    Last saw me on 6/10/2022.  At that visit she was volume o/l and her lasix was increased to 40mg.  Continued on her anti HTN.  Today she has no complaints.  Denies any orthopnea, SoB, Mc.  Walking around house and in grocery store without issues.  Says her LE edema is still there but going down everyday.  Last Cr was 1.5 (up from 1.4).     ROS:    Constitution: Negative for fever, chills, weight loss or gain.   HENT: Negative for sore throat, rhinorrhea, or headache.  Eyes: Negative for blurred or double vision.   Cardiovascular: See above  Pulmonary: Negative for SOB   Gastrointestinal: Negative for abdominal pain, nausea, vomiting, or diarrhea.   : Negative for dysuria.   Neurological: Negative for focal weakness or sensory changes.  PMH:     Past Medical History:   Diagnosis Date    Acute hypoxemic respiratory failure 6/26/2021    Acute superficial venous thrombosis of lower extremity, bilateral 1/25/2022    Aortic atherosclerosis     Arthritis     knee joint pain    Breast cancer 2002    left breast & lymph nodes-s/p sx with chemo    Bronchitis     with flu-2/2014    Cataracts, bilateral     Chronic anticoagulation 5/4/2022    Chronic diastolic heart failure 12/24/2019    Chronic kidney disease, stage 3     History of chemotherapy     last treatment 12/2002 (had 8 treatments)    Hyperlipidemia 11/20/2016    Hypertension     Lymphedema of both lower extremities 1/25/2022    Nephrolithiasis 6/2/2014    Osteoporosis     Paroxysmal atrial fibrillation 6/7/2021    Renal calculi     hematuria    Renal osteodystrophy 1/14/2016    Venous stasis dermatitis of both lower extremities 1/25/2022    Vitamin D insufficiency      Past Surgical History:   Procedure Laterality Date    BREAST BIOPSY Left 2002     core bx, +    BREAST BIOPSY Right 2018    core    BREAST BIOPSY Right 2019    BREAST LUMPECTOMY Left 2002    CYSTOSCOPY  4/28/2022    Procedure: CYSTOSCOPY;  Surgeon: Vik Ware MD;  [facility];  Service: Urology;;    CYSTOSCOPY  5/30/2022    Procedure: CYSTOSCOPY;  Surgeon: Bonnie Knutson MD;  [facility];  Service: Urology;;    LASER LITHOTRIPSY  5/30/2022    Procedure: LITHOTRIPSY, USING LASER;  Surgeon: Bonnie Knutson MD;  [facility];  Service: Urology;;    LITHOTRIPSY      MASTECTOMY Left 06/2002    left-& lymph node dissection    REPLACEMENT OF STENT Right 5/30/2022    Procedure: REPLACEMENT, STENT;  Surgeon: Bonnie Knutson MD;  [facility];  Service: Urology;  Laterality: Right;    RETROGRADE PYELOGRAPHY Right 4/28/2022    Procedure: PYELOGRAM, RETROGRADE;  Surgeon: Vik Ware MD;  Location: [facility] OR [address] [place] [address];  Service: Urology;  Laterality: Right;    ROBOT-ASSISTED LAPAROSCOPIC PARTIAL NEPHRECTOMY USING DA JESÚS XI Right 3/11/2021    Procedure: XI ROBOTIC NEPHRECTOMY, PARTIAL;  Surgeon: Taz Ortega MD;  Location: [facility] OR 2ND FLR;  Service: Urology;  Laterality: Right;  4hr/ gen with regional  [place] confirmation:  [id number] for 11:15am case NC    URETEROSCOPIC REMOVAL OF URETERIC CALCULUS Right 5/30/2022    Procedure: REMOVAL, CALCULUS, URETER, URETEROSCOPIC;  Surgeon: Bonnie Knutson MD;  Location: [facility] OR [address] [place] [address];  Service: Urology;  Laterality: Right;    ureteroscopy Bilateral     6.14    URETEROSCOPY Right 5/30/2022    Procedure: URETEROSCOPY;  Surgeon: oBnnie Knutson MD;  Location: [facility] OR [address] [place] [address];  Service: Urology;  Laterality: Right;     Allergies:     Review of patient's allergies indicates:   Allergen Reactions    Adhesive Rash     Pt states she removed a LATEX bandaid and the skin beneath was swollen and red. No other latex causes a reaction.     Medications:      Current Outpatient Medications on File Prior to Visit   Medication Sig Dispense Refill    acetaminophen (TYLENOL) 500 MG tablet Take 2 tablets (1,000 mg total) by mouth every 8 (eight) hours as needed for Pain. 42 tablet 0    apixaban (ELIQUIS) 5 mg Tab Take 1 tablet (5 mg total) by mouth 2 (two) times a day. 180 tablet 3    aspirin (ECOTRIN) 81 MG EC tablet Take 81 mg by mouth once daily.      biotin 1 mg tablet Take 1,000 mcg by mouth once daily.      budesonide-glycopyr-formoterol (BREZTRI AEROSPHERE) 160-9-4.8 mcg/actuation HFAA Inhale 2 puffs into the lungs 2 (two) times daily. 32.1 g 3    busPIRone (BUSPAR) 5 MG Tab Take 1 tablet (5 mg total) by mouth 2 (two) times daily as needed (anxiety). 60 tablet 2    fish oil-omega-3 fatty acids 300-1,000 mg capsule Take 1 capsule by mouth once daily.      fluticasone propionate (FLONASE) 50 mcg/actuation nasal spray 1 spray by Each Nostril route daily as needed (congestion).      furosemide (LASIX) 20 MG tablet Take 1 tablet by mouth daily If weight gain 2 to 3 lbs for 2 to 5 days. If no improvement, call MD 30 tablet 6    furosemide (LASIX) 40 MG tablet Take 1 tablet (40 mg total) by mouth once daily as directed. 30 tablet 3    inhalation spacing device Use as directed for inhalation. 1 Device 0    losartan (COZAAR) 25 MG tablet Take 1 tablet (25 mg total) by mouth once daily. 30 tablet 0    metoprolol succinate (TOPROL-XL) 100 MG 24 hr tablet Take 1 tablet (100 mg total) by mouth once daily. 30 tablet 6    ondansetron (ZOFRAN) 4 MG tablet Take 1 tablet (4 mg total) by mouth every 8 (eight) hours as needed for Nausea. 15 tablet 0    rosuvastatin (CRESTOR) 5 MG tablet Take 1 tablet (5 mg total) by mouth once daily. 90 tablet 0    rosuvastatin (CRESTOR) 5 MG tablet Take 1 tablet (5 mg total) by mouth once daily. 90 tablet 0    vitamin D (VITAMIN D3) 1000 units Tab TAKE 2 TABLETS ONE TIME DAILY 180 tablet 0     No current facility-administered  "medications on file prior to visit.     Social History:     Social History     Tobacco Use    Smoking status: Former Smoker     Packs/day: 1.00     Years: 50.00     Pack years: 50.00     Types: Cigarettes     Start date:      Quit date: 2009     Years since quittin.2    Smokeless tobacco: Never Used   Substance Use Topics    Alcohol use: No     Family History:     Family History   Problem Relation Age of Onset    Bone cancer Mother     Breast cancer Mother     Arthritis Mother         Breast Cancer    Breast cancer Sister     Cancer Father         Asbestos cancer    No Known Problems Brother     Fibroids Daughter     Crohn's disease Daughter     No Known Problems Daughter     Arthritis Sister         Breast Cancer    Anesthesia problems Neg Hx     Glaucoma Neg Hx      Physical Exam:   /60 (BP Location: Right arm, Patient Position: Sitting, BP Method: Medium (Automatic))   Pulse 61   Ht 5' 4" (1.626 m)   Wt 76.2 kg (167 lb 15.9 oz)   BMI 28.84 kg/m²    Wt Readings from Last 4 Encounters:   22 76.2 kg (167 lb 15.9 oz)   06/10/22 75.5 kg (166 lb 7.2 oz)   22 68.5 kg (151 lb)   22 73.9 kg (162 lb 14.7 oz)         Constitutional: No distress, obese, conversant  HEENT: Sclera anicteric, PERRLA, EOMI  Neck: No JVD, no masses, good movement  CV: RRR, S1 and S2 normal, no additional heart sounds or murmurs. Pulses 2+ in radial and DP bilaterally  Pulm: Clear to auscultation bilaterally with symmetrical expansion. No crackles  GI: Abdomen soft, non-tender, good bowel sounds  Extremities: Both extremities intact and grossly normal, skin is warm, no edema noted  Skin: No ecchymosis, erythema, or ulcers  Psych: AOx3, appropriate affect  Neuro: CNII-XII intact, no focal deficits      Labs:     Lab Results   Component Value Date     2022     2022    K 4.0 2022    K 4.0 2022     2022     2022    CO2 25 2022    " CO2 25 06/17/2022    BUN 31 (H) 06/17/2022    BUN 31 (H) 06/17/2022    CREATININE 1.5 (H) 06/17/2022    CREATININE 1.5 (H) 06/17/2022    ANIONGAP 9 06/17/2022    ANIONGAP 9 06/17/2022     No results found for: HGBA1C  Lab Results   Component Value Date     (H) 05/12/2022     (H) 06/25/2021     (H) 06/23/2021    Lab Results   Component Value Date    WBC 9.20 05/12/2022    HGB 11.3 (L) 05/12/2022    HCT 34.8 (L) 05/12/2022     05/12/2022    GRAN 6.3 05/12/2022    GRAN 67.9 05/12/2022     Lab Results   Component Value Date    CHOL 140 06/14/2021    HDL 65 06/14/2021    LDLCALC 49.4 (L) 06/14/2021    TRIG 128 06/14/2021          Imaging:       EF   Date Value Ref Range Status   05/04/2022 60 % Final   06/09/2021 65 % Final     TTE:   5/4/2022:  · The left ventricle is normal in size with normal systolic function.  · The estimated ejection fraction is 60%.  · There are segmental left ventricular wall motion abnormalities.  · There is abnormal septal wall motion.  · Indeterminate left ventricular diastolic function.  · Normal right ventricular size with normal right ventricular systolic function.  · Mild mitral regurgitation.  · Mild tricuspid regurgitation.  · Intermediate central venous pressure (8 mmHg).  · The estimated PA systolic pressure is 31 mmHg.     Stress Test:  Nuclear stress test 5/13/2022:    Equivocal myocardial perfusion scan.    There is a mild to moderate intensity, small to moderate sized, equivocal perfusion abnormality that is fixed in the inferior and inferolateral wall(s). This finding is equivocal due to bowel interference.    There are no other significant perfusion abnormalities.    The visually estimated ejection fraction is normal at rest and normal during stress.    There is normal wall motion at rest and post stress.    LV cavity size is normal at rest and normal at stress.    The EKG portion of this study is negative for ischemia.    The patient reported  no chest pain during the stress test.    There were no arrhythmias during stress.    When compared to the previous study from 4/15/2021, the inferior wall defect is now present on stress and rest images. There is underlying bowel which may be interfering.    Assessment:    76 y.o. with a past medical history of COPD, HFpEF (60% on 5/4/22), pAF (eliquis), HTN, HLD presenting to clinic for follow up     Plan:     #HFpEF:  Nearly euvolemic.  Non ischemic in etiology  - continue lasix 40mg qD,  losartan 25mg qD, metoprolol succ 100mg qD  - is considering enrolling in SPIRIT trial, will d/w at next appt  - RTC in 6 months     #pAFib:  CV 6, HB 1  - AC with eliquis 5mg BID  - continue rate control with metoprolol succ 100mg qD     #HTN:  BP in clinic today was 126/60  - continue losartan 25mg qD, metoprolol, and lasix 20mg qD  - keep a BP diary for next visit     #HLD:  Last lipid panel in 6/2021: chol 140, tri 128, HDL 65, LDL 49  - continue rosuvastatin 5mg qD  - told her to contact her PCP for repeat lipid panel and a1c     Signed:  Carlos Davis MD  Cardiology Fellow  Pager - [phone]  [facility]  6/10/2022 4:16 PM  Signed:  Carlos Davis MD  Cardiology Fellow  Pager - [phone]  [facility]  8/5/2022 7:09 AM

## 2022-08-10 ENCOUNTER — PATIENT MESSAGE (OUTPATIENT)
Dept: INTERNAL MEDICINE | Facility: CLINIC | Age: 77
End: 2022-08-10
Payer: MEDICARE

## 2022-08-10 DIAGNOSIS — R73.9 HYPERGLYCEMIA: Primary | ICD-10-CM

## 2022-08-10 DIAGNOSIS — E78.5 HYPERLIPIDEMIA, UNSPECIFIED HYPERLIPIDEMIA TYPE: ICD-10-CM

## 2022-08-10 DIAGNOSIS — N18.32 STAGE 3B CHRONIC KIDNEY DISEASE: ICD-10-CM

## 2022-08-10 NOTE — TELEPHONE ENCOUNTER
Care Due:                  Date            Visit Type   Department     Provider  --------------------------------------------------------------------------------                                EP -                              PRIMARY      [facility] INTERNAL  Last Visit: 05-      CARE ([facility])   MEDICINE       Celia Carlisle                              [facility]                              PRIMARY      [facility] INTERNAL  Next Visit: 08-      CARE ([facility])   BARB Carlisle                                                            Last  Test          Frequency    Reason                     Performed    Due Date  --------------------------------------------------------------------------------    Lipid Panel.  12 months..  rosuvastatin.............  06-   06-    [facility] Embedded Care Gaps. Reference number: [id number]. 8/10/2022   3:59:06 PM CDT

## 2022-08-11 RX ORDER — LOSARTAN POTASSIUM 25 MG/1
25 TABLET ORAL DAILY
Qty: 90 TABLET | Refills: 0 | Status: SHIPPED | OUTPATIENT
Start: 2022-08-11 | End: 2022-08-22 | Stop reason: SDUPTHER

## 2022-08-11 NOTE — PROGRESS NOTES
I have reviewed the patient's chart and the fellow's clinic note, as well as discussed the case with the fellow. I have personally spoken with and examined the patient in the clinic. I agree with the findings, assessment, and plan.      Ron Bruno MD  Consultative Cardiology - [facility]  .

## 2022-08-15 ENCOUNTER — HOSPITAL ENCOUNTER (OUTPATIENT)
Dept: RADIOLOGY | Facility: HOSPITAL | Age: 77
Discharge: HOME | End: 2022-08-15
Attending: INTERNAL MEDICINE
Payer: MEDICARE

## 2022-08-15 DIAGNOSIS — J43.2 CENTRILOBULAR EMPHYSEMA: ICD-10-CM

## 2022-08-15 DIAGNOSIS — R06.00 DYSPNEA AND RESPIRATORY ABNORMALITY: ICD-10-CM

## 2022-08-15 DIAGNOSIS — R06.89 DYSPNEA AND RESPIRATORY ABNORMALITY: ICD-10-CM

## 2022-08-15 PROCEDURE — 71046 X-RAY EXAM CHEST 2 VIEWS: CPT | Mod: TC

## 2022-08-15 PROCEDURE — 71046 X-RAY EXAM CHEST 2 VIEWS: CPT | Mod: 26,,, | Performed by: RADIOLOGY

## 2022-08-15 PROCEDURE — 71046 XR CHEST PA AND LATERAL: ICD-10-PCS | Mod: 26,,, | Performed by: RADIOLOGY

## 2022-08-17 ENCOUNTER — LAB VISIT (OUTPATIENT)
Dept: LAB | Facility: HOSPITAL | Age: 77
End: 2022-08-17
Attending: INTERNAL MEDICINE
Payer: MEDICARE

## 2022-08-17 DIAGNOSIS — N18.32 STAGE 3B CHRONIC KIDNEY DISEASE: ICD-10-CM

## 2022-08-17 DIAGNOSIS — E78.5 HYPERLIPIDEMIA, UNSPECIFIED HYPERLIPIDEMIA TYPE: ICD-10-CM

## 2022-08-17 DIAGNOSIS — R73.9 HYPERGLYCEMIA: ICD-10-CM

## 2022-08-17 LAB
ALBUMIN SERPL BCP-MCNC: 3.4 G/DL (ref 3.5–5.2)
ALP SERPL-CCNC: 89 U/L (ref 55–135)
ALT SERPL W/O P-5'-P-CCNC: 19 U/L (ref 10–44)
ANION GAP SERPL CALC-SCNC: 8 MMOL/L (ref 8–16)
AST SERPL-CCNC: 31 U/L (ref 10–40)
BASOPHILS # BLD AUTO: 0.07 K/UL (ref 0–0.2)
BASOPHILS NFR BLD: 0.9 % (ref 0–1.9)
BILIRUB SERPL-MCNC: 0.8 MG/DL (ref 0.1–1)
BUN SERPL-MCNC: 29 MG/DL (ref 8–23)
CALCIUM SERPL-MCNC: 10.3 MG/DL (ref 8.7–10.5)
CHLORIDE SERPL-SCNC: 106 MMOL/L (ref 95–110)
CHOLEST SERPL-MCNC: 155 MG/DL (ref 120–199)
CHOLEST/HDLC SERPL: 2.3 {RATIO} (ref 2–5)
CO2 SERPL-SCNC: 28 MMOL/L (ref 23–29)
CREAT SERPL-MCNC: 1.5 MG/DL (ref 0.5–1.4)
DIFFERENTIAL METHOD: NORMAL
EOSINOPHIL # BLD AUTO: 0.2 K/UL (ref 0–0.5)
EOSINOPHIL NFR BLD: 2.8 % (ref 0–8)
ERYTHROCYTE [DISTWIDTH] IN BLOOD BY AUTOMATED COUNT: 12.9 % (ref 11.5–14.5)
EST. GFR  (NO RACE VARIABLE): 35.4 ML/MIN/1.73 M^2
ESTIMATED AVG GLUCOSE: 108 MG/DL (ref 68–131)
GLUCOSE SERPL-MCNC: 87 MG/DL (ref 70–110)
HBA1C MFR BLD: 5.4 % (ref 4–5.6)
HCT VFR BLD AUTO: 43.5 % (ref 37–48.5)
HDLC SERPL-MCNC: 67 MG/DL (ref 40–75)
HDLC SERPL: 43.2 % (ref 20–50)
HGB BLD-MCNC: 14.4 G/DL (ref 12–16)
IMM GRANULOCYTES # BLD AUTO: 0.02 K/UL (ref 0–0.04)
IMM GRANULOCYTES NFR BLD AUTO: 0.3 % (ref 0–0.5)
LDLC SERPL CALC-MCNC: 71.6 MG/DL (ref 63–159)
LYMPHOCYTES # BLD AUTO: 2.4 K/UL (ref 1–4.8)
LYMPHOCYTES NFR BLD: 30.3 % (ref 18–48)
MCH RBC QN AUTO: 30.5 PG (ref 27–31)
MCHC RBC AUTO-ENTMCNC: 33.1 G/DL (ref 32–36)
MCV RBC AUTO: 92 FL (ref 82–98)
MONOCYTES # BLD AUTO: 0.9 K/UL (ref 0.3–1)
MONOCYTES NFR BLD: 11.7 % (ref 4–15)
NEUTROPHILS # BLD AUTO: 4.3 K/UL (ref 1.8–7.7)
NEUTROPHILS NFR BLD: 54 % (ref 38–73)
NONHDLC SERPL-MCNC: 88 MG/DL
NRBC BLD-RTO: 0 /100 WBC
PLATELET # BLD AUTO: 183 K/UL (ref 150–450)
PMV BLD AUTO: 9.6 FL (ref 9.2–12.9)
POTASSIUM SERPL-SCNC: 4.1 MMOL/L (ref 3.5–5.1)
PROT SERPL-MCNC: 6.4 G/DL (ref 6–8.4)
RBC # BLD AUTO: 4.72 M/UL (ref 4–5.4)
SODIUM SERPL-SCNC: 142 MMOL/L (ref 136–145)
TRIGL SERPL-MCNC: 82 MG/DL (ref 30–150)
WBC # BLD AUTO: 7.89 K/UL (ref 3.9–12.7)

## 2022-08-17 PROCEDURE — 80053 COMPREHEN METABOLIC PANEL: CPT | Performed by: INTERNAL MEDICINE

## 2022-08-17 PROCEDURE — 80061 LIPID PANEL: CPT | Performed by: INTERNAL MEDICINE

## 2022-08-17 PROCEDURE — 36415 COLL VENOUS BLD VENIPUNCTURE: CPT | Performed by: INTERNAL MEDICINE

## 2022-08-17 PROCEDURE — 83036 HEMOGLOBIN GLYCOSYLATED A1C: CPT | Performed by: INTERNAL MEDICINE

## 2022-08-17 PROCEDURE — 85025 COMPLETE CBC W/AUTO DIFF WBC: CPT | Performed by: INTERNAL MEDICINE

## 2022-08-22 ENCOUNTER — OFFICE VISIT (OUTPATIENT)
Dept: INTERNAL MEDICINE | Facility: CLINIC | Age: 77
End: 2022-08-22
Payer: MEDICARE

## 2022-08-22 VITALS
OXYGEN SATURATION: 97 % | HEART RATE: 74 BPM | DIASTOLIC BLOOD PRESSURE: 64 MMHG | SYSTOLIC BLOOD PRESSURE: 130 MMHG | HEIGHT: 64 IN | WEIGHT: 167.75 LBS | BODY MASS INDEX: 28.64 KG/M2

## 2022-08-22 DIAGNOSIS — Z12.31 SCREENING MAMMOGRAM FOR BREAST CANCER: ICD-10-CM

## 2022-08-22 DIAGNOSIS — I50.32 CHRONIC DIASTOLIC HEART FAILURE: ICD-10-CM

## 2022-08-22 DIAGNOSIS — I10 ESSENTIAL HYPERTENSION: ICD-10-CM

## 2022-08-22 DIAGNOSIS — M81.0 OSTEOPOROSIS, UNSPECIFIED OSTEOPOROSIS TYPE, UNSPECIFIED PATHOLOGICAL FRACTURE PRESENCE: ICD-10-CM

## 2022-08-22 DIAGNOSIS — N18.32 STAGE 3B CHRONIC KIDNEY DISEASE: ICD-10-CM

## 2022-08-22 DIAGNOSIS — I48.0 PAROXYSMAL ATRIAL FIBRILLATION: ICD-10-CM

## 2022-08-22 DIAGNOSIS — J44.89 ASTHMA-COPD OVERLAP SYNDROME: ICD-10-CM

## 2022-08-22 DIAGNOSIS — E78.5 HYPERLIPIDEMIA, UNSPECIFIED HYPERLIPIDEMIA TYPE: ICD-10-CM

## 2022-08-22 PROCEDURE — 1101F PT FALLS ASSESS-DOCD LE1/YR: CPT | Mod: CPTII,S$GLB,, | Performed by: INTERNAL MEDICINE

## 2022-08-22 PROCEDURE — 99214 OFFICE O/P EST MOD 30 MIN: CPT | Mod: S$GLB,,, | Performed by: INTERNAL MEDICINE

## 2022-08-22 PROCEDURE — 3075F PR MOST RECENT SYSTOLIC BLOOD PRESS GE 130-139MM HG: ICD-10-PCS | Mod: CPTII,S$GLB,, | Performed by: INTERNAL MEDICINE

## 2022-08-22 PROCEDURE — 1126F PR PAIN SEVERITY QUANTIFIED, NO PAIN PRESENT: ICD-10-PCS | Mod: CPTII,S$GLB,, | Performed by: INTERNAL MEDICINE

## 2022-08-22 PROCEDURE — 1159F PR MEDICATION LIST DOCUMENTED IN MEDICAL RECORD: ICD-10-PCS | Mod: CPTII,S$GLB,, | Performed by: INTERNAL MEDICINE

## 2022-08-22 PROCEDURE — 99999 PR PBB SHADOW E&M-EST. PATIENT-LVL IV: CPT | Mod: PBBFAC,,, | Performed by: INTERNAL MEDICINE

## 2022-08-22 PROCEDURE — 3288F FALL RISK ASSESSMENT DOCD: CPT | Mod: CPTII,S$GLB,, | Performed by: INTERNAL MEDICINE

## 2022-08-22 PROCEDURE — 99999 PR PBB SHADOW E&M-EST. PATIENT-LVL IV: ICD-10-PCS | Mod: PBBFAC,,, | Performed by: INTERNAL MEDICINE

## 2022-08-22 PROCEDURE — 1126F AMNT PAIN NOTED NONE PRSNT: CPT | Mod: CPTII,S$GLB,, | Performed by: INTERNAL MEDICINE

## 2022-08-22 PROCEDURE — 1101F PR PT FALLS ASSESS DOC 0-1 FALLS W/OUT INJ PAST YR: ICD-10-PCS | Mod: CPTII,S$GLB,, | Performed by: INTERNAL MEDICINE

## 2022-08-22 PROCEDURE — 3078F DIAST BP <80 MM HG: CPT | Mod: CPTII,S$GLB,, | Performed by: INTERNAL MEDICINE

## 2022-08-22 PROCEDURE — 1159F MED LIST DOCD IN RCRD: CPT | Mod: CPTII,S$GLB,, | Performed by: INTERNAL MEDICINE

## 2022-08-22 PROCEDURE — 3288F PR FALLS RISK ASSESSMENT DOCUMENTED: ICD-10-PCS | Mod: CPTII,S$GLB,, | Performed by: INTERNAL MEDICINE

## 2022-08-22 PROCEDURE — 3075F SYST BP GE 130 - 139MM HG: CPT | Mod: CPTII,S$GLB,, | Performed by: INTERNAL MEDICINE

## 2022-08-22 PROCEDURE — 3078F PR MOST RECENT DIASTOLIC BLOOD PRESSURE < 80 MM HG: ICD-10-PCS | Mod: CPTII,S$GLB,, | Performed by: INTERNAL MEDICINE

## 2022-08-22 PROCEDURE — 99214 PR OFFICE/OUTPT VISIT, EST, LEVL IV, 30-39 MIN: ICD-10-PCS | Mod: S$GLB,,, | Performed by: INTERNAL MEDICINE

## 2022-08-22 RX ORDER — METOPROLOL SUCCINATE 100 MG/1
100 TABLET, EXTENDED RELEASE ORAL DAILY
Qty: 90 TABLET | Refills: 3 | Status: SHIPPED | OUTPATIENT
Start: 2022-08-22 | End: 2023-08-22 | Stop reason: SDUPTHER

## 2022-08-22 RX ORDER — BUDESONIDE, GLYCOPYRROLATE, AND FORMOTEROL FUMARATE 160; 9; 4.8 UG/1; UG/1; UG/1
2 AEROSOL, METERED RESPIRATORY (INHALATION) 2 TIMES DAILY
Qty: 32.1 G | Refills: 3 | Status: SHIPPED | OUTPATIENT
Start: 2022-08-22 | End: 2023-08-22 | Stop reason: SDUPTHER

## 2022-08-22 RX ORDER — LOSARTAN POTASSIUM 25 MG/1
25 TABLET ORAL DAILY
Qty: 90 TABLET | Refills: 3 | Status: SHIPPED | OUTPATIENT
Start: 2022-08-22 | End: 2023-03-24 | Stop reason: SDUPTHER

## 2022-08-22 RX ORDER — ROSUVASTATIN CALCIUM 5 MG/1
5 TABLET, COATED ORAL DAILY
Qty: 90 TABLET | Refills: 3 | Status: SHIPPED | OUTPATIENT
Start: 2022-08-22 | End: 2023-08-22 | Stop reason: SDUPTHER

## 2022-08-22 NOTE — PROGRESS NOTES
Patient ID: Misa Barajas is a 76 y.o. female.    Chief Complaint: Follow-up and Hypertension    HPI Misa is a 76 y.o. female with HTN, HLD, aortic atherosclerosis, atrial fibrillation, chronic kidney disease stage 3, chronic diastolic CHF, osteoporosis and asthma/ COPD who presents for routine follow-up on medical conditions.  She has no acute complaints today.  Reviewed her last cardiology note with her in clinic today.  Patient reports recent increase in Lasix dose from 20-40 mg daily.  We reviewed recent lab results from 08/17/2022 today.  We reviewed her last DEXA bone density.  Discussed doing low dose CT scan for lung cancer screening.  She prefers not to do this test at this time.  States she will follow up with her pulmonologist about this. Due for mammogram.  Denies any recent issues with COPD suggest coughing, wheezing, shortness of breath.  Needs refill on her Breztri.     Health Maintenance Topics with due status: Not Due       Topic Last Completion Date    TETANUS VACCINE 08/08/2016    Influenza Vaccine 10/22/2021    Lipid Panel 08/17/2022       Review of Systems   All other systems reviewed and are negative.      Objective:     Vitals:    08/22/22 1454   BP: 130/64   Pulse: 74        Physical Exam  Vitals reviewed.   Constitutional:       General: She is not in acute distress.     Appearance: Normal appearance. She is well-developed. She is not ill-appearing, toxic-appearing or diaphoretic.   HENT:      Head: Normocephalic and atraumatic.      Right Ear: External ear normal.      Left Ear: External ear normal.      Nose: Nose normal.   Eyes:      General: No scleral icterus.        Right eye: No discharge.         Left eye: No discharge.      Extraocular Movements: Extraocular movements intact.      Conjunctiva/sclera: Conjunctivae normal.   Cardiovascular:      Rate and Rhythm: Normal rate and regular rhythm.      Heart sounds: Normal heart sounds. No murmur heard.    No friction rub. No  gallop.   Pulmonary:      Effort: Pulmonary effort is normal. No respiratory distress.      Breath sounds: Normal breath sounds. No stridor. No wheezing, rhonchi or rales.   Abdominal:      General: Bowel sounds are normal. There is no distension.      Palpations: Abdomen is soft. There is no mass.      Tenderness: There is no abdominal tenderness. There is no guarding or rebound.      Hernia: No hernia is present.   Musculoskeletal:      Cervical back: Neck supple. No rigidity or tenderness.   Lymphadenopathy:      Cervical: No cervical adenopathy.   Skin:     General: Skin is warm and dry.   Neurological:      General: No focal deficit present.      Mental Status: She is alert and oriented to person, place, and time. Mental status is at baseline.   Psychiatric:         Mood and Affect: Mood normal.         Behavior: Behavior normal.         Thought Content: Thought content normal.         Judgment: Judgment normal.         Assessment:       1. Asthma-COPD overlap syndrome Well controlled   2. Hyperlipidemia, unspecified hyperlipidemia type Chronic   3. Essential hypertension Well controlled   4. Screening mammogram for breast cancer    5. Osteoporosis, unspecified osteoporosis type, unspecified pathological fracture presence Chronic   6. Paroxysmal atrial fibrillation Well controlled   7. Chronic diastolic heart failure Well controlled   8. Stage 3b chronic kidney disease Chronic       Plan:         Asthma-COPD overlap syndrome  Comments:  Continue current medication. F/u with pulmonology  Orders:  -     budesonide-glycopyr-formoterol (BREZTRI AEROSPHERE) 160-9-4.8 mcg/actuation HFAA; Inhale 2 puffs into the lungs 2 (two) times daily.  Dispense: 32.1 g; Refill: 3  -     CBC Auto Differential; Future; Expected date: 02/01/2023    Hyperlipidemia, unspecified hyperlipidemia type  Comments:  Continue current medication  Orders:  -     rosuvastatin (CRESTOR) 5 MG tablet; Take 1 tablet (5 mg total) by mouth once daily.   Dispense: 90 tablet; Refill: 3  -     Lipid Panel; Future; Expected date: 02/01/2023    Essential hypertension  Comments:  Continue current medication  Orders:  -     losartan (COZAAR) 25 MG tablet; Take 1 tablet (25 mg total) by mouth once daily.  Dispense: 90 tablet; Refill: 3  -     metoprolol succinate (TOPROL-XL) 100 MG 24 hr tablet; Take 1 tablet (100 mg total) by mouth once daily.  Dispense: 90 tablet; Refill: 3  -     Comprehensive Metabolic Panel; Future; Expected date: 02/01/2023    Screening mammogram for breast cancer  -     Mammo Digital Screening Bilat; Future; Expected date: 08/22/2022    Osteoporosis, unspecified osteoporosis type, unspecified pathological fracture presence  Comments:  Recheck DXA. May need endocrinology referral for treatment, based on last results  Orders:  -     DXA Bone Density Spine And Hip; Future; Expected date: 08/22/2022  -     Vitamin D; Future; Expected date: 02/22/2023    Paroxysmal atrial fibrillation  Comments:  Continue current medication and f/u with cardiology    Chronic diastolic heart failure  Comments:  Continue current medication and follow up with Cardiology.    Stage 3b chronic kidney disease  Comments:  Manage/monitored by Nephrology.        RTC 6 months     Warning signs discussed, patient to call with any further issues or worsening of symptoms.       Parts of the above note were dictated using a voice dictation software. Please excuse any grammatical or typographical errors.

## 2022-08-23 ENCOUNTER — HOSPITAL ENCOUNTER (OUTPATIENT)
Dept: RADIOLOGY | Facility: HOSPITAL | Age: 77
Discharge: HOME | End: 2022-08-23
Attending: STUDENT IN AN ORGANIZED HEALTH CARE EDUCATION/TRAINING PROGRAM
Payer: MEDICARE

## 2022-08-23 DIAGNOSIS — N20.0 KIDNEY STONE: ICD-10-CM

## 2022-08-23 PROCEDURE — 76770 US EXAM ABDO BACK WALL COMP: CPT | Mod: TC

## 2022-08-23 PROCEDURE — 76770 US KIDNEY: ICD-10-PCS | Mod: 26,,, | Performed by: RADIOLOGY

## 2022-08-23 PROCEDURE — 76770 US EXAM ABDO BACK WALL COMP: CPT | Mod: 26,,, | Performed by: RADIOLOGY

## 2022-08-24 ENCOUNTER — PES CALL (OUTPATIENT)
Dept: ADMINISTRATIVE | Facility: CLINIC | Age: 77
End: 2022-08-24
Payer: MEDICARE

## 2022-08-26 ENCOUNTER — PES CALL (OUTPATIENT)
Dept: ADMINISTRATIVE | Facility: CLINIC | Age: 77
End: 2022-08-26
Payer: MEDICARE

## 2022-08-30 ENCOUNTER — OFFICE VISIT (OUTPATIENT)
Dept: UROLOGY | Facility: CLINIC | Age: 77
End: 2022-08-30
Payer: MEDICARE

## 2022-08-30 VITALS
HEIGHT: 64 IN | DIASTOLIC BLOOD PRESSURE: 64 MMHG | WEIGHT: 163 LBS | HEART RATE: 62 BPM | BODY MASS INDEX: 27.83 KG/M2 | SYSTOLIC BLOOD PRESSURE: 118 MMHG

## 2022-08-30 DIAGNOSIS — N13.30 HYDROURETERONEPHROSIS: Primary | ICD-10-CM

## 2022-08-30 DIAGNOSIS — N20.0 NEPHROLITHIASIS: ICD-10-CM

## 2022-08-30 PROCEDURE — 1160F PR REVIEW ALL MEDS BY PRESCRIBER/CLIN PHARMACIST DOCUMENTED: ICD-10-PCS | Mod: CPTII,S$GLB,,

## 2022-08-30 PROCEDURE — 3078F DIAST BP <80 MM HG: CPT | Mod: CPTII,S$GLB,,

## 2022-08-30 PROCEDURE — 1101F PR PT FALLS ASSESS DOC 0-1 FALLS W/OUT INJ PAST YR: ICD-10-PCS | Mod: CPTII,S$GLB,,

## 2022-08-30 PROCEDURE — 3288F PR FALLS RISK ASSESSMENT DOCUMENTED: ICD-10-PCS | Mod: CPTII,S$GLB,,

## 2022-08-30 PROCEDURE — 99214 PR OFFICE/OUTPT VISIT, EST, LEVL IV, 30-39 MIN: ICD-10-PCS | Mod: S$GLB,,,

## 2022-08-30 PROCEDURE — 1126F AMNT PAIN NOTED NONE PRSNT: CPT | Mod: CPTII,S$GLB,,

## 2022-08-30 PROCEDURE — 1126F PR PAIN SEVERITY QUANTIFIED, NO PAIN PRESENT: ICD-10-PCS | Mod: CPTII,S$GLB,,

## 2022-08-30 PROCEDURE — 3074F SYST BP LT 130 MM HG: CPT | Mod: CPTII,S$GLB,,

## 2022-08-30 PROCEDURE — 99999 PR PBB SHADOW E&M-EST. PATIENT-LVL IV: CPT | Mod: PBBFAC,,,

## 2022-08-30 PROCEDURE — 1159F PR MEDICATION LIST DOCUMENTED IN MEDICAL RECORD: ICD-10-PCS | Mod: CPTII,S$GLB,,

## 2022-08-30 PROCEDURE — 1160F RVW MEDS BY RX/DR IN RCRD: CPT | Mod: CPTII,S$GLB,,

## 2022-08-30 PROCEDURE — 3078F PR MOST RECENT DIASTOLIC BLOOD PRESSURE < 80 MM HG: ICD-10-PCS | Mod: CPTII,S$GLB,,

## 2022-08-30 PROCEDURE — 1159F MED LIST DOCD IN RCRD: CPT | Mod: CPTII,S$GLB,,

## 2022-08-30 PROCEDURE — 99214 OFFICE O/P EST MOD 30 MIN: CPT | Mod: S$GLB,,,

## 2022-08-30 PROCEDURE — 99999 PR PBB SHADOW E&M-EST. PATIENT-LVL IV: ICD-10-PCS | Mod: PBBFAC,,,

## 2022-08-30 PROCEDURE — 3074F PR MOST RECENT SYSTOLIC BLOOD PRESSURE < 130 MM HG: ICD-10-PCS | Mod: CPTII,S$GLB,,

## 2022-08-30 PROCEDURE — 1101F PT FALLS ASSESS-DOCD LE1/YR: CPT | Mod: CPTII,S$GLB,,

## 2022-08-30 PROCEDURE — 3288F FALL RISK ASSESSMENT DOCD: CPT | Mod: CPTII,S$GLB,,

## 2022-08-30 NOTE — PROGRESS NOTES
CHIEF COMPLAINT:  Follow up renal stones    HISTORY OF PRESENTING ILLINESS:  Misa Barajas is a 76 y.o. female established pt of Dr. Knutson. She was seen by Dr. Ware in the ED 4/28/22 for GH, decreased UOP and chills. RSS showed her to have 1.3 cm R distal stone that was causing severe R hydroureteronephrosis - cysto with R ureteral JJ stent placed. She then underwent R ureteroscopy and stone extraction with stent with strings with Dr. Knutson 5/30/22. Stone analysis resulted 100% calcium oxalate monohydrate. PMHx of COPD, CRF on O2, Diastolic HF (EF 65%), Parox AFib on Eliquis, HTN, HLD, CKD, Hx of Breast cancer s/p L mastectomy. States that her lasix has been increased from 20mg once daily to 40mg once daily. She is mindful of balancing her fluid intake to avoid dehydration.    3/11/2021: right robotic partial nephrectomy  with Dr. Ortega, pathology showed oncocytoma.   4/4/2017: ESWL for right nephrolithiasis. She is s/p a right ESWL 4/17 complicated with a right perinephric hematoma.   6/2014: bilateral ureteroscopy, then another 6/2014 right ureteroscopy to clear right stone burden.     She is here today as a 3 month follow up to discuss renal US results from 8/23/22:  R kidney: 2 renal calculi measuring 5 and 6 mm. No hydronephrosis.  L Kidney: 2 renal calculi measuring 6 and 7 mm. No hydronephrosis.  Compared to CT RSS 4/28/22.      REVIEW OF SYSTEMS:  Review of Systems   Constitutional:  Negative for chills and fever.   HENT:  Negative for congestion and sore throat.    Respiratory:  Negative for cough and shortness of breath.    Cardiovascular:  Negative for chest pain and palpitations.   Gastrointestinal:  Negative for nausea and vomiting.   Genitourinary:  Negative for dysuria, flank pain, frequency, hematuria and urgency.   Neurological:  Negative for dizziness and headaches.       PATIENT HISTORY:    Past Medical History:   Diagnosis Date    Acute hypoxemic respiratory failure 6/26/2021     Acute superficial venous thrombosis of lower extremity, bilateral 1/25/2022    Aortic atherosclerosis     Arthritis     knee joint pain    Asthma-COPD overlap syndrome 8/22/2022    Breast cancer 2002    left breast & lymph nodes-s/p sx with chemo    Bronchitis     with flu-2/2014    Cataracts, bilateral     Chronic anticoagulation 5/4/2022    Chronic diastolic heart failure 12/24/2019    Chronic kidney disease, stage 3     History of chemotherapy     last treatment 12/2002 (had 8 treatments)    Hyperlipidemia 11/20/2016    Hypertension     Lymphedema of both lower extremities 1/25/2022    Nephrolithiasis 6/2/2014    Osteoporosis     Paroxysmal atrial fibrillation 6/7/2021    Renal calculi     hematuria    Renal osteodystrophy 1/14/2016    Venous stasis dermatitis of both lower extremities 1/25/2022    Vitamin D insufficiency        Past Surgical History:   Procedure Laterality Date    BREAST BIOPSY Left 2002    core bx, +    BREAST BIOPSY Right 2018    core    BREAST BIOPSY Right 2019    BREAST LUMPECTOMY Left 2002    CYSTOSCOPY  4/28/2022    Procedure: CYSTOSCOPY;  Surgeon: Vik Ware MD;  Location: [facility] OR [address] [place] [address];  Service: Urology;;    CYSTOSCOPY  5/30/2022    Procedure: CYSTOSCOPY;  Surgeon: Bonnie Knutson MD;  Location: [facility] OR [address] [place] [address];  Service: Urology;;    LASER LITHOTRIPSY  5/30/2022    Procedure: LITHOTRIPSY, USING LASER;  Surgeon: Bonnie Knutson MD;  Location: [facility] OR [address] [place] [address];  Service: Urology;;    LITHOTRIPSY      MASTECTOMY Left 06/2002    left-& lymph node dissection    REPLACEMENT OF STENT Right 5/30/2022    Procedure: REPLACEMENT, STENT;  Surgeon: Bonnie Knutson MD;  Location: [facility] OR [address] [place] [address];  Service: Urology;  Laterality: Right;    RETROGRADE PYELOGRAPHY Right 4/28/2022    Procedure: PYELOGRAM, RETROGRADE;  Surgeon: Vik Ware MD;  Location: [facility] OR [address] [place] [address];  Service: Urology;  Laterality: Right;    ROBOT-ASSISTED LAPAROSCOPIC PARTIAL NEPHRECTOMY  USING DA JESÚS XI Right 3/11/2021    Procedure: XI ROBOTIC NEPHRECTOMY, PARTIAL;  Surgeon: Taz Ortega MD;  Location: [facility] OR 2ND FLR;  Service: Urology;  Laterality: Right;  4hr/ gen with regional  [place] confirmation:  [id number] for 11:15am case [place]    URETEROSCOPIC REMOVAL OF URETERIC CALCULUS Right 2022    Procedure: REMOVAL, CALCULUS, URETER, URETEROSCOPIC;  Surgeon: Bonnie Knutson MD;  Location: [facility] OR 1ST FLR;  Service: Urology;  Laterality: Right;    ureteroscopy Bilateral     6.14    URETEROSCOPY Right 2022    Procedure: URETEROSCOPY;  Surgeon: Bonnie Knutson MD;  Location: [facility] OR 1ST FLR;  Service: Urology;  Laterality: Right;       Family History   Problem Relation Age of Onset    Bone cancer Mother     Breast cancer Mother     Arthritis Mother         Breast Cancer    Breast cancer Sister     Cancer Father         Asbestos cancer    No Known Problems Brother     Fibroids Daughter     Crohn's disease Daughter     No Known Problems Daughter     Arthritis Sister         Breast Cancer    Anesthesia problems Neg Hx     Glaucoma Neg Hx        Social History     Socioeconomic History    Marital status:    Tobacco Use    Smoking status: Former     Packs/day: 1.00     Years: 50.00     Pack years: 50.00     Types: Cigarettes     Start date:      Quit date: 2009     Years since quittin.2    Smokeless tobacco: Never   Substance and Sexual Activity    Alcohol use: No    Drug use: No    Sexual activity: Not Currently     Partners: Male     Birth control/protection: Partner-Vasectomy     Social Determinants of Health     Financial Resource Strain: Low Risk     Difficulty of Paying Living Expenses: Not very hard   Food Insecurity: No Food Insecurity    Worried About Running Out of Food in the Last Year: Never true    Ran Out of Food in the Last Year: Never true   Transportation Needs: No Transportation Needs    Lack of Transportation (Medical): No    Lack of  Transportation (Non-Medical): No   Physical Activity: Inactive    Days of Exercise per Week: 0 days    Minutes of Exercise per Session: 0 min   Stress: No Stress Concern Present    Feeling of Stress : Only a little   Social Connections: Unknown    Frequency of Communication with Friends and Family: More than three times a week    Frequency of Social Gatherings with Friends and Family: Twice a week    Active Member of Clubs or Organizations: No    Attends Club or Organization Meetings: Never    Marital Status:    Housing Stability: Low Risk     Unable to Pay for Housing in the Last Year: No    Number of Places Lived in the Last Year: 1    Unstable Housing in the Last Year: No       Allergies:  Adhesive    Medications:    Current Outpatient Medications:     acetaminophen (TYLENOL) 500 MG tablet, Take 2 tablets (1,000 mg total) by mouth every 8 (eight) hours as needed for Pain., Disp: 42 tablet, Rfl: 0    apixaban (ELIQUIS) 5 mg Tab, Take 1 tablet (5 mg total) by mouth 2 (two) times a day., Disp: 180 tablet, Rfl: 3    aspirin (ECOTRIN) 81 MG EC tablet, Take 81 mg by mouth once daily., Disp: , Rfl:     biotin 1 mg tablet, Take 1,000 mcg by mouth once daily., Disp: , Rfl:     budesonide-glycopyr-formoterol (BREZTRI AEROSPHERE) 160-9-4.8 mcg/actuation HFAA, Inhale 2 puffs into the lungs 2 (two) times daily., Disp: 32.1 g, Rfl: 3    fluticasone propionate (FLONASE) 50 mcg/actuation nasal spray, 1 spray by Each Nostril route daily as needed (congestion)., Disp: , Rfl:     furosemide (LASIX) 40 MG tablet, Take 1 tablet (40 mg total) by mouth once daily as directed., Disp: 30 tablet, Rfl: 3    inhalation spacing device, Use as directed for inhalation., Disp: 1 Device, Rfl: 0    losartan (COZAAR) 25 MG tablet, Take 1 tablet (25 mg total) by mouth once daily., Disp: 90 tablet, Rfl: 3    metoprolol succinate (TOPROL-XL) 100 MG 24 hr tablet, Take 1 tablet (100 mg total) by mouth once daily., Disp: 90 tablet, Rfl: 3     ondansetron (ZOFRAN) 4 MG tablet, Take 1 tablet (4 mg total) by mouth every 8 (eight) hours as needed for Nausea., Disp: 15 tablet, Rfl: 0    rosuvastatin (CRESTOR) 5 MG tablet, Take 1 tablet (5 mg total) by mouth once daily., Disp: 90 tablet, Rfl: 3    vitamin D (VITAMIN D3) 1000 units Tab, TAKE 2 TABLETS ONE TIME DAILY, Disp: 180 tablet, Rfl: 0    PHYSICAL EXAMINATION:  Physical Exam  Constitutional:       Appearance: Normal appearance.   HENT:      Head: Normocephalic and atraumatic.      Right Ear: External ear normal.      Left Ear: External ear normal.   Pulmonary:      Effort: Pulmonary effort is normal. No respiratory distress.   Skin:     General: Skin is warm and dry.   Neurological:      General: No focal deficit present.      Mental Status: She is alert and oriented to person, place, and time.   Psychiatric:         Mood and Affect: Mood normal.         Behavior: Behavior normal.       IMPRESSION:  Encounter Diagnoses   Name Primary?    Right sided hydroureteronephrosis Yes    Nephrolithiasis      Assessment:       1. Right sided hydroureteronephrosis    2. Nephrolithiasis        Plan:   Right sided hydroureteronephrosis  - Discussed resolution based on 3 month renal US. Continue lifestyle modifications to decrease stone formation and growth. Discussed maintaining appropriate water intake, especially due to increase in Lasix. Dietary information attached to AVS - reviewed. Discussed s/s of obstructing or migrating stone and the importance of same day clinic appointment or ED visit for s/s of infection.    Follow up in 6 months for stone observation with renal US prior to appointment     I spent 30 minutes with the patient of which more than half was spent in direct consultation with the patient in regards to our treatment and plan.  We addressed the office findings and recent labs.   Education and recommendations of today's plan of care including home remedies and needed follow up with PCP. We discussed  the chief complaint/LUTS and the possible contributory factors.   Recommended lifestyle modifications with proper, healthy diet, good hydration if no fluid restrictions; reducing bladder irritants. Benefits of regular exercise approved by PCP.

## 2022-08-30 NOTE — PATIENT INSTRUCTIONS
Calcium oxalate stones are the most common type of kidney stone. Calcium oxalate stones are caused by too much oxalate in the urine.Oxalate is a natural substance found in many foods. After your body uses what it needs, waste products travel through the bloodstream to the kidneys and are removed through urine. Urine has various wastes in it. If there is too much waste in too little liquid, crystals can begin to form. These crystals may stick together and form a solid mass (a kidney stone). Oxalate is one type of substance that can form crystals in the urine. This can happen if there is too much oxalate, too little liquid, and the oxalate sticks to calcium while urine is being made by the kidneys.     Certain risk factors may cause your body to form calcium oxalate stones. These risk factors include:  Dehydration from not drinking enough fluid     A diet too high in:  Protein  Oxalate  Sodium (salt)  Sugar (like high fructose corn syrup)     Medical conditions like:  Obesity  Dent Disease (a rare genetic disorder that affects the kidneys)  Hyperparathyroidism (a very high amount of a type of hormone called parathyroid hormone in the blood that causes a loss of calcium. Calcium is needed to bind with oxalate and leave the body)     Digestive Diseases and Surgeries like:  Inflammatory Bowel Disease (IBD) such as:  Ulcerative Colitis  Crohns Disease  Gastric bypass surgeries        How can I lower my chances of forming calcium oxalate stones?  Drink enough fluids. The number one thing you can do is to drink enough fluids, like water. Drinking enough fluids will thin out your urine and make it harder for chemicals to build up and form crystals. Avoid eating too much protein. Eating too many foods high in protein can cause stones to form.  Eat less salt (sodium). A diet high in salt (sodium) causes calcium to build in your urine. Too much calcium in your urine can lead to new stones. It can also cause your bones to  weaken.  Include the right amount of calcium in your diet. Some people may think they can keep stones from forming by avoiding calcium, but the opposite is true. Calcium is a nutrient that is found in dairy products, such as yogurt, milk and cheese. You need to eat calcium so that it can bind with oxalate in the stomach and intestines before it moves to the kidneys. Eating foods with calcium is a good way for oxalates to leave the body and not form stones. The best way to get calcium into your body is through the foods you eat. It may seem easier to increase your calcium by taking a supplement. However, calcium in the form of a supplement may raise your chances of forming new calcium oxalate stones. Avoid vitamin C supplements. Talk to your healthcare provider before taking vitamin C supplements. Too much vitamin C can cause high amounts of oxalate in the urine.  Eat less oxalate-rich foods. Limiting how many oxalate-rich foods you eat each day may help lower your chance of forming new stones. There are some foods you should avoid because of their high oxalate levels.  Some foods very high in oxalate are:  Nuts  Rhubarb  Beets  Chocolate soy milk  All Bran  Buckwheat flour  Miso  Tahini  Sesame seeds  Swiss chard     You should not cut out all foods that contain calcium or oxalate, you need these nutrients!  If you restrict all calcium or oxalate in your diet It can lead to poor nutrition and can cause other health problems. A better plan? Eat and drink calcium and oxalate-rich foods together during a meal. Doing this helps oxalate and calcium bind to one another in the stomach and intestines before reaching the kidneys, making it less likely for kidney stones to form in the urine.

## 2022-09-21 ENCOUNTER — OFFICE VISIT (OUTPATIENT)
Dept: HOME HEALTH SERVICES | Facility: CLINIC | Age: 77
End: 2022-09-21
Payer: MEDICARE

## 2022-09-21 VITALS
WEIGHT: 162 LBS | TEMPERATURE: 97 F | HEIGHT: 64 IN | HEART RATE: 67 BPM | DIASTOLIC BLOOD PRESSURE: 58 MMHG | SYSTOLIC BLOOD PRESSURE: 118 MMHG | RESPIRATION RATE: 16 BRPM | OXYGEN SATURATION: 96 % | BODY MASS INDEX: 27.66 KG/M2

## 2022-09-21 DIAGNOSIS — J43.2 CENTRILOBULAR EMPHYSEMA: ICD-10-CM

## 2022-09-21 DIAGNOSIS — G62.0 DRUG-INDUCED POLYNEUROPATHY: ICD-10-CM

## 2022-09-21 DIAGNOSIS — N18.32 STAGE 3B CHRONIC KIDNEY DISEASE: ICD-10-CM

## 2022-09-21 DIAGNOSIS — Z99.81 DEPENDENCE ON SUPPLEMENTAL OXYGEN: ICD-10-CM

## 2022-09-21 DIAGNOSIS — J44.89 ASTHMA-COPD OVERLAP SYNDROME: ICD-10-CM

## 2022-09-21 DIAGNOSIS — E66.3 OVERWEIGHT WITH BODY MASS INDEX (BMI) OF 27 TO 27.9 IN ADULT: ICD-10-CM

## 2022-09-21 DIAGNOSIS — M81.0 OSTEOPOROSIS, UNSPECIFIED OSTEOPOROSIS TYPE, UNSPECIFIED PATHOLOGICAL FRACTURE PRESENCE: ICD-10-CM

## 2022-09-21 DIAGNOSIS — I10 ESSENTIAL HYPERTENSION: ICD-10-CM

## 2022-09-21 DIAGNOSIS — D69.2 SENILE PURPURA: ICD-10-CM

## 2022-09-21 DIAGNOSIS — M85.88 OSTEOPENIA OF SPINE: ICD-10-CM

## 2022-09-21 DIAGNOSIS — N25.81 SECONDARY HYPERPARATHYROIDISM OF RENAL ORIGIN: ICD-10-CM

## 2022-09-21 DIAGNOSIS — E78.2 MIXED HYPERLIPIDEMIA: ICD-10-CM

## 2022-09-21 DIAGNOSIS — Z00.00 ENCOUNTER FOR PREVENTIVE HEALTH EXAMINATION: Primary | ICD-10-CM

## 2022-09-21 DIAGNOSIS — J96.11 CHRONIC RESPIRATORY FAILURE WITH HYPOXIA: ICD-10-CM

## 2022-09-21 DIAGNOSIS — I70.0 AORTIC ATHEROSCLEROSIS: ICD-10-CM

## 2022-09-21 DIAGNOSIS — J84.10 CALCIFIED GRANULOMA OF LUNG: ICD-10-CM

## 2022-09-21 DIAGNOSIS — I50.33 ACUTE ON CHRONIC DIASTOLIC HEART FAILURE: ICD-10-CM

## 2022-09-21 DIAGNOSIS — Z79.01 CHRONIC ANTICOAGULATION: ICD-10-CM

## 2022-09-21 DIAGNOSIS — I48.0 PAROXYSMAL ATRIAL FIBRILLATION: ICD-10-CM

## 2022-09-21 PROCEDURE — 99499 UNLISTED E&M SERVICE: CPT | Mod: S$GLB,,,

## 2022-09-21 PROCEDURE — 1160F RVW MEDS BY RX/DR IN RCRD: CPT | Mod: CPTII,S$GLB,,

## 2022-09-21 PROCEDURE — 1101F PT FALLS ASSESS-DOCD LE1/YR: CPT | Mod: CPTII,S$GLB,,

## 2022-09-21 PROCEDURE — 1159F PR MEDICATION LIST DOCUMENTED IN MEDICAL RECORD: ICD-10-PCS | Mod: CPTII,S$GLB,,

## 2022-09-21 PROCEDURE — 3074F SYST BP LT 130 MM HG: CPT | Mod: CPTII,S$GLB,,

## 2022-09-21 PROCEDURE — 1159F MED LIST DOCD IN RCRD: CPT | Mod: CPTII,S$GLB,,

## 2022-09-21 PROCEDURE — 3078F DIAST BP <80 MM HG: CPT | Mod: CPTII,S$GLB,,

## 2022-09-21 PROCEDURE — 1126F PR PAIN SEVERITY QUANTIFIED, NO PAIN PRESENT: ICD-10-PCS | Mod: CPTII,S$GLB,,

## 2022-09-21 PROCEDURE — 1170F PR FUNCTIONAL STATUS ASSESSED: ICD-10-PCS | Mod: CPTII,S$GLB,,

## 2022-09-21 PROCEDURE — 1126F AMNT PAIN NOTED NONE PRSNT: CPT | Mod: CPTII,S$GLB,,

## 2022-09-21 PROCEDURE — 1170F FXNL STATUS ASSESSED: CPT | Mod: CPTII,S$GLB,,

## 2022-09-21 PROCEDURE — 3074F PR MOST RECENT SYSTOLIC BLOOD PRESSURE < 130 MM HG: ICD-10-PCS | Mod: CPTII,S$GLB,,

## 2022-09-21 PROCEDURE — G0439 PR MEDICARE ANNUAL WELLNESS SUBSEQUENT VISIT: ICD-10-PCS | Mod: S$GLB,,,

## 2022-09-21 PROCEDURE — 3078F PR MOST RECENT DIASTOLIC BLOOD PRESSURE < 80 MM HG: ICD-10-PCS | Mod: CPTII,S$GLB,,

## 2022-09-21 PROCEDURE — G0439 PPPS, SUBSEQ VISIT: HCPCS | Mod: S$GLB,,,

## 2022-09-21 PROCEDURE — 3288F FALL RISK ASSESSMENT DOCD: CPT | Mod: CPTII,S$GLB,,

## 2022-09-21 PROCEDURE — 1101F PR PT FALLS ASSESS DOC 0-1 FALLS W/OUT INJ PAST YR: ICD-10-PCS | Mod: CPTII,S$GLB,,

## 2022-09-21 PROCEDURE — 99499 RISK ADDL DX/OHS AUDIT: ICD-10-PCS | Mod: S$GLB,,,

## 2022-09-21 PROCEDURE — 1160F PR REVIEW ALL MEDS BY PRESCRIBER/CLIN PHARMACIST DOCUMENTED: ICD-10-PCS | Mod: CPTII,S$GLB,,

## 2022-09-21 PROCEDURE — 3288F PR FALLS RISK ASSESSMENT DOCUMENTED: ICD-10-PCS | Mod: CPTII,S$GLB,,

## 2022-09-21 NOTE — Clinical Note
Medicare AWV completed.  recommendations reviewed. Due for bone density scan and mammogram. Eligible for flu shot and COVID booster. F/u with PCP as instructed.   --Bette

## 2022-09-21 NOTE — PATIENT INSTRUCTIONS
1. Follow up with Celia Carlisle MD as instructed.    2. Due for bone density scan.     3. Due for mammogram.     4. Due for flu shot if interested.    5. Eligible for COVID booster if interested.    Counseling and Referral of Other Preventative  (Italic type indicates deductible and co-insurance are waived)    Patient Name: Misa Barajas  Today's Date: 9/21/2022    Health Maintenance         Date Due Completion Date    DEXA Scan 08/30/2021 8/30/2019    Mammogram 09/15/2021 9/15/2020    COVID-19 Vaccine (4 - Booster for Pfizer series) 04/10/2022 12/10/2021    Influenza Vaccine (1) 09/01/2022 10/22/2021    LDCT Lung Screen 08/22/2023 (Originally 6/28/2022) 6/28/2021    TETANUS VACCINE 08/08/2026 8/8/2016    Lipid Panel 08/17/2027 8/17/2022          No orders of the defined types were placed in this encounter.    The following information is provided to all patients.  This information is to help you find resources for any of the problems found today that may be affecting your health:                Living healthy guide: www.[facility].[place].gov      Understanding Diabetes: www.diabetes.org      Eating healthy: www.cdc.gov/healthyweight      CDC home safety checklist: www.cdc.gov/steadi/patient.html      Agency on Aging: www.goea.[place].[facility]      Alcoholics anonymous (AA): www.aa.org      Physical Activity: www.ayesha.nih.gov/oa8iuzj      Tobacco use: www.quitwithusla.org

## 2022-09-21 NOTE — PROGRESS NOTES
"Misa Barajas presented for a  Medicare AWV and comprehensive Health Risk Assessment today. The following components were reviewed and updated:    Medical history  Family History  Social history  Allergies and Current Medications  Health Risk Assessment  Health Maintenance  Care Team         ** See Completed Assessments for Annual Wellness Visit within the encounter summary.**         The following assessments were completed:  Living Situation  CAGE  Depression Screening  Timed Get Up and Go  Whisper Test  Cognitive Function Screening    Nutrition Screening  ADL Screening  PAQ Screening        Vitals:    09/21/22 0843   BP: (!) 118/58   BP Location: Right arm   Patient Position: Sitting   Pulse: 67   Resp: 16   Temp: 97.2 °F (36.2 °C)   SpO2: 96%   Weight: 73.5 kg (162 lb)   Height: 5' 4" (1.626 m)     Body mass index is 27.81 kg/m².    Physical Exam  Constitutional:       General: She is not in acute distress.     Appearance: Normal appearance.   Cardiovascular:      Rate and Rhythm: Normal rate and regular rhythm.      Pulses: Normal pulses.      Heart sounds: No murmur heard.  Pulmonary:      Effort: Pulmonary effort is normal. No respiratory distress.      Breath sounds: Normal breath sounds.   Abdominal:      General: Bowel sounds are normal.   Musculoskeletal:      Right lower leg: No edema.      Left lower leg: No edema.   Skin:     Findings: Bruising present.   Neurological:      General: No focal deficit present.      Mental Status: She is alert and oriented to person, place, and time.   Psychiatric:         Mood and Affect: Mood normal.         Behavior: Behavior normal.             Diagnoses and health risks identified today and associated recommendations/orders:    1. Encounter for preventive health examination  Assessments completed.  HM recommendations reviewed. Due for bone density scan and mammogram. Eligible for flu shot and COVID booster. F/u with PCP as instructed.       2. Stage 3b chronic " kidney disease  Chronic, stable on current regimen. Followed by PCP.     3. Calcified granuloma of lung  Chronic, stable on current Breztri Aerophere regimen. Followed by Pulmonology.     4. Asthma-COPD overlap syndrome  Chronic, stable on current Breztri Aerophere regimen. Followed by Pulmonology.     5. Centrilobular emphysema  Chronic, stable on current Breztri Aerophere regimen. Followed by Pulmonology.     6. Aortic atherosclerosis  Chronic, stable on current statin regimen. Followed by Cardiology.    7. Paroxysmal atrial fibrillation  Chronic, stable on current Eliquis regimen. Followed by Cardiology.    8. Acute on chronic diastolic heart failure  Chronic, stable on current Lasix regimen. Followed by Cardiology.     9. Chronic respiratory failure with hypoxia  Chronic, stable on current Breztri Aerophere regimen. Followed by Pulmonology.     10. Drug-induced polyneuropathy  Chronic, stable on current regimen. Followed by PCP.     11. Secondary hyperparathyroidism of renal origin  Chronic, stable on current regimen. Followed by PCP.     12. Essential hypertension  Chronic, stable on current Losartan & Metoprolol regimen. Followed by Cardiology.     13. Mixed hyperlipidemia  Chronic, stable on current statin regimen. Followed by Cardiology.     14. Chronic anticoagulation  Chronic, stable on current Eliquis regimen. Followed by Cardiology.    15. Overweight with body mass index (BMI) of 27 to 27.9 in adult  Chronic, stable on current regimen. Followed by PCP.     16. Osteopenia of spine  Chronic, stable on current regimen. Followed by PCP.     17. Osteoporosis, unspecified osteoporosis type, unspecified pathological fracture presence  Chronic, stable on current regimen. Followed by PCP.     18. Dependence on supplemental oxygen  Chronic, stable on current home oxygen regimen. Followed by Pulmonology.      Provided Misa with a 5-10 year written screening schedule and personal prevention plan. Recommendations  were developed using the USPSTF age appropriate recommendations. Education, counseling, and referrals were provided as needed. After Visit Summary printed and given to patient which includes a list of additional screenings\tests needed.    Follow up in about 1 year (around 9/21/2023) for Medicare AWV.      Bette Sanders NP  I offered to discuss advanced care planning, including how to pick a person who would make decisions for you if you were unable to make them for yourself, called a health care power of , and what kind of decisions you might make such as use of life sustaining treatments such as ventilators and tube feeding when faced with a life limiting illness recorded on a living will that they will need to know. (How you want to be cared for as you near the end of your natural life)     X  Patient has advanced directives written and agrees to provide copies to the institution.

## 2022-10-31 RX ORDER — FUROSEMIDE 40 MG/1
40 TABLET ORAL DAILY
Qty: 30 TABLET | Refills: 3 | Status: CANCELLED | OUTPATIENT
Start: 2022-10-31

## 2022-10-31 RX ORDER — FUROSEMIDE 40 MG/1
40 TABLET ORAL DAILY
Qty: 30 TABLET | Refills: 3 | Status: SHIPPED | OUTPATIENT
Start: 2022-10-31 | End: 2023-02-22 | Stop reason: SDUPTHER

## 2022-12-27 NOTE — PATIENT INSTRUCTIONS
Counseling and Referral of Other Preventative  (Italic type indicates deductible and co-insurance are waived)    Patient Name: Misa Barajas  Today's Date: 7/14/2017      SERVICE LIMITATIONS RECOMMENDATION    Vaccines    · Pneumococcal (once after 65)    · Influenza (annually)    · Hepatitis B (if medium/high risk)    · Prevnar 13      Hepatitis B medium/high risk factors:       - End-stage renal disease       - Hemophiliacs who received Factor VII or         IX concentrates       - Clients of institutions for the mentally             retarded       - Persons who live in the same house as          a HepB carrier       - Homosexual men       - Illicit injectable drug abusers     Pneumococcal: Done, no repeat necessary     Influenza: Done, repeat in one year     Hepatitis B: N/A     Prevnar 13: Done, no repeat necessary    Mammogram (biennial age 50-74)  Annually (age 40 or over)  Scheduled, see appointments    Pap (up to age 70 and after 70 if unknown history or abnormal study last 10 years)    N/A     The USPSTF recommends against screening for cervical cancer in women older than age 65 years who have had adequate prior screening and are not otherwise at high risk for cervical cancer.      Colorectal cancer screening (to age 75)    · Fecal occult blood test (annual)  · Flexible sigmoidoscopy (5y)  · Screening colonoscopy (10y)  · Barium enema   Recommended to patient, declined    Diabetes self-management training (no USPSTF recommendations)  Requires referral by treating physician for patient with diabetes or renal disease. 10 hours of initial DSMT sessions of no less than 30 minutes each in a continuous 12-month period. 2 hours of follow-up DSMT in subsequent years.  N/A    Bone mass measurements (age 65 & older, biennial)  Requires diagnosis related to osteoporosis or estrogen deficiency. Biennial benefit unless patient has history of long-term glucocorticoid  Last done 03/21/2016, recommend to repeat every 3   Hospital follow-up NEW PCP transitional care appointment has been scheduled with Keith Blount NP for Tuesday, January 10, 2023 at 2:40 p.m. Kailey Lemos Pending patient discharge.   Beni Bella, Care Management Assistant years    Glaucoma screening (no USPSTF recommendation)  Diabetes mellitus, family history   , age 50 or over    American, age 65 or over  Done this year, repeat every year    Medical nutrition therapy for diabetes or renal disease (no recommended schedule)  Requires referral by treating physician for patient with diabetes or renal disease or kidney transplant within the past 3 years.  Can be provided in same year as diabetes self-management training (DSMT), and CMS recommends medical nutrition therapy take place after DSMT. Up to 3 hours for initial year and 2 hours in subsequent years.  N/A    Cardiovascular screening blood tests (every 5 years)  · Fasting lipid panel  Order as a panel if possible  Done this year, repeat every year    Diabetes screening tests (at least every 3 years, Medicare covers annually or at 6-month intervals for prediabetic patients)  · Fasting blood sugar (FBS) or glucose tolerance test (GTT)  Patient must be diagnosed with one of the following:       - Hypertension       - Dyslipidemia       - Obesity (BMI 30kg/m2)       - Previous elevated impaired FBS or GTT       ... or any two of the following:       - Overweight (BMI 25 but <30)       - Family history of diabetes       - Age 65 or older       - History of gestational diabetes or birth of baby weighing more than 9 pounds  Done this year, repeat every year    Abdominal aortic aneurysm screening (once)  · Sonogram   Limited to patients who meet one of the following criteria:       - Men who are 65-75 years old and have smoked more than 100 cigarette in their lifetime       - Anyone with a family history of abdominal aortic aneurysm       - Anyone recommended for screening by the USPSTF  N/A    HIV screening (annually for increased risk patients)  · HIV-1 and HIV-2 by EIA, or BHAVESH, rapid antibody test or oral mucosa transudate  Patients must be at increased risk for HIV infection per USPSTF guidelines or pregnant.  Tests covered annually for patient at increased risk or as requested by the patient. Pregnant patients may receive up to 3 tests during pregnancy.  Risks discussed, screening is not recommended    Smoking cessation counseling (up to 8 sessions per year)  Patients must be asymptomatic of tobacco-related conditions to receive as a preventative service.  Non-smoker    Subsequent annual wellness visit  At least 12 months since last AWV  Return in one year     The following information is provided to all patients.  This information is to help you find resources for any of the problems found today that may be affecting your health:                Living healthy guide: www.[facility].[place].[facility]      Understanding Diabetes: www.diabetes.org      Eating healthy: www.cdc.gov/healthyweight      CDC home safety checklist: www.cdc.gov/steadi/patient.html      Agency on Aging: www.goea.[place].[facility]      Alcoholics anonymous (AA): www.aa.org      Physical Activity: www.ayesha.nih.gov/pf6rrgf      Tobacco use: www.quitwithusla.org

## 2023-02-10 ENCOUNTER — OFFICE VISIT (OUTPATIENT)
Dept: CARDIOLOGY | Facility: CLINIC | Age: 78
End: 2023-02-10
Payer: MEDICARE

## 2023-02-10 VITALS
DIASTOLIC BLOOD PRESSURE: 103 MMHG | SYSTOLIC BLOOD PRESSURE: 146 MMHG | BODY MASS INDEX: 29.35 KG/M2 | WEIGHT: 171.94 LBS | HEART RATE: 70 BPM | HEIGHT: 64 IN

## 2023-02-10 DIAGNOSIS — Z79.01 CHRONIC ANTICOAGULATION: ICD-10-CM

## 2023-02-10 DIAGNOSIS — I10 ESSENTIAL HYPERTENSION: ICD-10-CM

## 2023-02-10 DIAGNOSIS — I50.33 ACUTE ON CHRONIC DIASTOLIC HEART FAILURE: ICD-10-CM

## 2023-02-10 DIAGNOSIS — I70.0 AORTIC ATHEROSCLEROSIS: ICD-10-CM

## 2023-02-10 DIAGNOSIS — E78.2 MIXED HYPERLIPIDEMIA: ICD-10-CM

## 2023-02-10 DIAGNOSIS — I48.0 PAROXYSMAL ATRIAL FIBRILLATION: ICD-10-CM

## 2023-02-10 DIAGNOSIS — N18.30 STAGE 3 CHRONIC KIDNEY DISEASE, UNSPECIFIED WHETHER STAGE 3A OR 3B CKD: ICD-10-CM

## 2023-02-10 DIAGNOSIS — I50.32 CHRONIC DIASTOLIC HEART FAILURE: Primary | ICD-10-CM

## 2023-02-10 PROCEDURE — 3080F PR MOST RECENT DIASTOLIC BLOOD PRESSURE >= 90 MM HG: ICD-10-PCS | Mod: CPTII,GC,S$GLB, | Performed by: STUDENT IN AN ORGANIZED HEALTH CARE EDUCATION/TRAINING PROGRAM

## 2023-02-10 PROCEDURE — 3077F SYST BP >= 140 MM HG: CPT | Mod: CPTII,GC,S$GLB, | Performed by: STUDENT IN AN ORGANIZED HEALTH CARE EDUCATION/TRAINING PROGRAM

## 2023-02-10 PROCEDURE — 3288F FALL RISK ASSESSMENT DOCD: CPT | Mod: CPTII,GC,S$GLB, | Performed by: STUDENT IN AN ORGANIZED HEALTH CARE EDUCATION/TRAINING PROGRAM

## 2023-02-10 PROCEDURE — 99214 PR OFFICE/OUTPT VISIT, EST, LEVL IV, 30-39 MIN: ICD-10-PCS | Mod: GC,S$GLB,, | Performed by: STUDENT IN AN ORGANIZED HEALTH CARE EDUCATION/TRAINING PROGRAM

## 2023-02-10 PROCEDURE — 99999 PR PBB SHADOW E&M-EST. PATIENT-LVL IV: CPT | Mod: PBBFAC,GC,, | Performed by: STUDENT IN AN ORGANIZED HEALTH CARE EDUCATION/TRAINING PROGRAM

## 2023-02-10 PROCEDURE — 1101F PR PT FALLS ASSESS DOC 0-1 FALLS W/OUT INJ PAST YR: ICD-10-PCS | Mod: CPTII,GC,S$GLB, | Performed by: STUDENT IN AN ORGANIZED HEALTH CARE EDUCATION/TRAINING PROGRAM

## 2023-02-10 PROCEDURE — 1160F PR REVIEW ALL MEDS BY PRESCRIBER/CLIN PHARMACIST DOCUMENTED: ICD-10-PCS | Mod: CPTII,GC,S$GLB, | Performed by: STUDENT IN AN ORGANIZED HEALTH CARE EDUCATION/TRAINING PROGRAM

## 2023-02-10 PROCEDURE — 1159F PR MEDICATION LIST DOCUMENTED IN MEDICAL RECORD: ICD-10-PCS | Mod: CPTII,GC,S$GLB, | Performed by: STUDENT IN AN ORGANIZED HEALTH CARE EDUCATION/TRAINING PROGRAM

## 2023-02-10 PROCEDURE — 1126F PR PAIN SEVERITY QUANTIFIED, NO PAIN PRESENT: ICD-10-PCS | Mod: CPTII,GC,S$GLB, | Performed by: STUDENT IN AN ORGANIZED HEALTH CARE EDUCATION/TRAINING PROGRAM

## 2023-02-10 PROCEDURE — 3080F DIAST BP >= 90 MM HG: CPT | Mod: CPTII,GC,S$GLB, | Performed by: STUDENT IN AN ORGANIZED HEALTH CARE EDUCATION/TRAINING PROGRAM

## 2023-02-10 PROCEDURE — 3077F PR MOST RECENT SYSTOLIC BLOOD PRESSURE >= 140 MM HG: ICD-10-PCS | Mod: CPTII,GC,S$GLB, | Performed by: STUDENT IN AN ORGANIZED HEALTH CARE EDUCATION/TRAINING PROGRAM

## 2023-02-10 PROCEDURE — 3288F PR FALLS RISK ASSESSMENT DOCUMENTED: ICD-10-PCS | Mod: CPTII,GC,S$GLB, | Performed by: STUDENT IN AN ORGANIZED HEALTH CARE EDUCATION/TRAINING PROGRAM

## 2023-02-10 PROCEDURE — 1159F MED LIST DOCD IN RCRD: CPT | Mod: CPTII,GC,S$GLB, | Performed by: STUDENT IN AN ORGANIZED HEALTH CARE EDUCATION/TRAINING PROGRAM

## 2023-02-10 PROCEDURE — 1160F RVW MEDS BY RX/DR IN RCRD: CPT | Mod: CPTII,GC,S$GLB, | Performed by: STUDENT IN AN ORGANIZED HEALTH CARE EDUCATION/TRAINING PROGRAM

## 2023-02-10 PROCEDURE — 99999 PR PBB SHADOW E&M-EST. PATIENT-LVL IV: ICD-10-PCS | Mod: PBBFAC,GC,, | Performed by: STUDENT IN AN ORGANIZED HEALTH CARE EDUCATION/TRAINING PROGRAM

## 2023-02-10 PROCEDURE — 1126F AMNT PAIN NOTED NONE PRSNT: CPT | Mod: CPTII,GC,S$GLB, | Performed by: STUDENT IN AN ORGANIZED HEALTH CARE EDUCATION/TRAINING PROGRAM

## 2023-02-10 PROCEDURE — 1101F PT FALLS ASSESS-DOCD LE1/YR: CPT | Mod: CPTII,GC,S$GLB, | Performed by: STUDENT IN AN ORGANIZED HEALTH CARE EDUCATION/TRAINING PROGRAM

## 2023-02-10 PROCEDURE — 99214 OFFICE O/P EST MOD 30 MIN: CPT | Mod: GC,S$GLB,, | Performed by: STUDENT IN AN ORGANIZED HEALTH CARE EDUCATION/TRAINING PROGRAM

## 2023-02-10 RX ORDER — LOSARTAN POTASSIUM 50 MG/1
50 TABLET ORAL DAILY
Qty: 90 TABLET | Refills: 3 | Status: SHIPPED | OUTPATIENT
Start: 2023-02-10 | End: 2023-02-22

## 2023-02-10 NOTE — PROGRESS NOTES
Cardiology Clinic Note  Reason for Visit: Follow up    HPI:   76 y.o. with a past medical history of COPD, HFpEF (60% on 5/4/22), pAF (eliquis), HTN, HLD presenting to clinic for follow up    Last saw me 8/5/2022.  At that time she was nearly euvolemic and continued on her medications.  Today she has no complaints. Says she feels well.  Complaining of LE ankle swelling that is worse at night and resolves in AM.  Denies any chest pain, palpitations.  Having some SOB with walking lonog distances but resolves with use of her inhaler.      ROS:    Constitution: Negative for fever, chills, weight loss or gain.   HENT: Negative for sore throat, rhinorrhea, or headache.  Eyes: Negative for blurred or double vision.   Cardiovascular: See above  Pulmonary: Negative for SOB   Gastrointestinal: Negative for abdominal pain, nausea, vomiting, or diarrhea.   : Negative for dysuria.   Neurological: Negative for focal weakness or sensory changes.  PMH:     Past Medical History:   Diagnosis Date    Acute hypoxemic respiratory failure 6/26/2021    Acute superficial venous thrombosis of lower extremity, bilateral 1/25/2022    Aortic atherosclerosis     Arthritis     knee joint pain    Asthma-COPD overlap syndrome 8/22/2022    Breast cancer 2002    left breast & lymph nodes-s/p sx with chemo    Bronchitis     with flu-2/2014    Cataracts, bilateral     Chronic anticoagulation 5/4/2022    Chronic diastolic heart failure 12/24/2019    Chronic kidney disease, stage 3     History of chemotherapy     last treatment 12/2002 (had 8 treatments)    Hyperlipidemia 11/20/2016    Hypertension     Lymphedema of both lower extremities 1/25/2022    Nephrolithiasis 6/2/2014    Osteoporosis     Paroxysmal atrial fibrillation 6/7/2021    Renal calculi     hematuria    Renal osteodystrophy 1/14/2016    Venous stasis dermatitis of both lower extremities 1/25/2022    Vitamin D insufficiency      Past Surgical History:   Procedure Laterality Date     BREAST BIOPSY Left 2002    core bx, +    BREAST BIOPSY Right 2018    core    BREAST BIOPSY Right 2019    BREAST LUMPECTOMY Left 2002    CYSTOSCOPY  4/28/2022    Procedure: CYSTOSCOPY;  Surgeon: Vik Ware MD;  [facility];  Service: Urology;;    CYSTOSCOPY  5/30/2022    Procedure: CYSTOSCOPY;  Surgeon: Bonnie Knutson MD;  [facility];  Service: Urology;;    LASER LITHOTRIPSY  5/30/2022    Procedure: LITHOTRIPSY, USING LASER;  Surgeon: Bonnie Knutson MD;  [facility];  Service: Urology;;    LITHOTRIPSY      MASTECTOMY Left 06/2002    left-& lymph node dissection    REPLACEMENT OF STENT Right 5/30/2022    Procedure: REPLACEMENT, STENT;  Surgeon: Bonnie Knutson MD;  [facility];  Service: Urology;  Laterality: Right;    RETROGRADE PYELOGRAPHY Right 4/28/2022    Procedure: PYELOGRAM, RETROGRADE;  Surgeon: Vik Ware MD;  [facility];  Service: Urology;  Laterality: Right;    ROBOT-ASSISTED LAPAROSCOPIC PARTIAL NEPHRECTOMY USING DA JESÚS XI Right 3/11/2021    Procedure: XI ROBOTIC NEPHRECTOMY, PARTIAL;  Surgeon: Taz Ortega MD;  Location: [facility] OR 2ND FLR;  Service: Urology;  Laterality: Right;  4hr/ gen with regional  [place] confirmation:  [phone] for 11:15am case NC    URETEROSCOPIC REMOVAL OF URETERIC CALCULUS Right 5/30/2022    Procedure: REMOVAL, CALCULUS, URETER, URETEROSCOPIC;  Surgeon: Bonnie Knutson MD;  [facility];  Service: Urology;  Laterality: Right;    ureteroscopy Bilateral     6.14    URETEROSCOPY Right 5/30/2022    Procedure: URETEROSCOPY;  Surgeon: Bonnie Knutson MD;  Location: [facility] OR [address] [place] [address];  Service: Urology;  Laterality: Right;     Allergies:     Review of patient's allergies indicates:   Allergen Reactions    Adhesive Rash     Pt states she removed a LATEX bandaid and the skin beneath was swollen and red. No other latex causes a reaction.      Medications:     Current Outpatient Medications on File Prior to Visit   Medication Sig Dispense Refill    acetaminophen (TYLENOL) 500 MG tablet Take 2 tablets (1,000 mg total) by mouth every 8 (eight) hours as needed for Pain. 42 tablet 0    apixaban (ELIQUIS) 5 mg Tab Take 1 tablet (5 mg total) by mouth 2 (two) times a day. 180 tablet 3    aspirin (ECOTRIN) 81 MG EC tablet Take 81 mg by mouth once daily.      biotin 1 mg tablet Take 1,000 mcg by mouth once daily.      budesonide-glycopyr-formoterol (BREZTRI AEROSPHERE) 160-9-4.8 mcg/actuation HFAA Inhale 2 puffs into the lungs 2 (two) times daily. 32.1 g 3    fluticasone propionate (FLONASE) 50 mcg/actuation nasal spray 1 spray by Each Nostril route daily as needed (congestion).      furosemide (LASIX) 40 MG tablet Take 1 tablet (40 mg total) by mouth once daily as directed. 30 tablet 3    inhalation spacing device Use as directed for inhalation. 1 Device 0    losartan (COZAAR) 25 MG tablet Take 1 tablet (25 mg total) by mouth once daily. 90 tablet 3    metoprolol succinate (TOPROL-XL) 100 MG 24 hr tablet Take 1 tablet (100 mg total) by mouth once daily. 90 tablet 3    rosuvastatin (CRESTOR) 5 MG tablet Take 1 tablet (5 mg total) by mouth once daily. 90 tablet 3    vitamin D (VITAMIN D3) 1000 units Tab TAKE 2 TABLETS ONE TIME DAILY 180 tablet 0     No current facility-administered medications on file prior to visit.     Social History:     Social History     Tobacco Use    Smoking status: Former     Packs/day: 1.00     Years: 50.00     Pack years: 50.00     Types: Cigarettes     Start date:      Quit date: 2009     Years since quittin.7    Smokeless tobacco: Never   Substance Use Topics    Alcohol use: No     Family History:     Family History   Problem Relation Age of Onset    Bone cancer Mother     Breast cancer Mother     Arthritis Mother         Breast Cancer    Breast cancer Sister     Cancer Father         Asbestos cancer    No Known  Problems Brother     Fibroids Daughter     Crohn's disease Daughter     No Known Problems Daughter     Arthritis Sister         Breast Cancer    Anesthesia problems Neg Hx     Glaucoma Neg Hx      Physical Exam:   There were no vitals taken for this visit.   Wt Readings from Last 4 Encounters:   09/21/22 73.5 kg (162 lb)   08/30/22 73.9 kg (163 lb)   08/22/22 76.1 kg (167 lb 12.3 oz)   08/05/22 76.2 kg (167 lb 15.9 oz)         Constitutional: No distress, obese, conversant  HEENT: Sclera anicteric, PERRLA, EOMI  Neck: No JVD, no masses, good movement  CV: RRR, S1 and S2 normal, no additional heart sounds or murmurs. Pulses 2+ and equal bilaterally in radial arteries, Renzo's normal on right. Distal pulses are 2+ and equal in the femoral, DP and PT areas bilaterally  Pulm: Clear to auscultation bilaterally with symmetrical expansion. Chest wall palpated for reproduction of pain symptoms, and no pain was able to be produced on palpation or resistance exercises  GI: Abdomen soft, non-tender, good bowel sounds  Extremities: Both extremities intact and grossly normal, skin is warm, minimal edema in ankles bilaterally with varicose veins  Skin: No ecchymosis, erythema, or ulcers  Psych: AOx3, appropriate affect  Neuro: CNII-XII intact, no focal deficits      Labs:     Lab Results   Component Value Date     08/17/2022    K 4.1 08/17/2022     08/17/2022    CO2 28 08/17/2022    BUN 29 (H) 08/17/2022    CREATININE 1.5 (H) 08/17/2022    ANIONGAP 8 08/17/2022     Lab Results   Component Value Date    HGBA1C 5.4 08/17/2022     Lab Results   Component Value Date     (H) 05/12/2022     (H) 06/25/2021     (H) 06/23/2021    Lab Results   Component Value Date    WBC 7.89 08/17/2022    HGB 14.4 08/17/2022    HCT 43.5 08/17/2022     08/17/2022    GRAN 4.3 08/17/2022    GRAN 54.0 08/17/2022     Lab Results   Component Value Date    CHOL 155 08/17/2022    HDL 67 08/17/2022    LDLCALC 71.6  08/17/2022    TRIG 82 08/17/2022          Imaging:       EF   Date Value Ref Range Status   05/04/2022 60 % Final   06/09/2021 65 % Final     TTE:   5/4/2022:  The left ventricle is normal in size with normal systolic function.  The estimated ejection fraction is 60%.  There are segmental left ventricular wall motion abnormalities.  There is abnormal septal wall motion.  Indeterminate left ventricular diastolic function.  Normal right ventricular size with normal right ventricular systolic function.  Mild mitral regurgitation.  Mild tricuspid regurgitation.  Intermediate central venous pressure (8 mmHg).  The estimated PA systolic pressure is 31 mmHg.     Stress Test:  Nuclear stress test 5/13/2022:    Equivocal myocardial perfusion scan.    There is a mild to moderate intensity, small to moderate sized, equivocal perfusion abnormality that is fixed in the inferior and inferolateral wall(s). This finding is equivocal due to bowel interference.    There are no other significant perfusion abnormalities.    The visually estimated ejection fraction is normal at rest and normal during stress.    There is normal wall motion at rest and post stress.    LV cavity size is normal at rest and normal at stress.    The EKG portion of this study is negative for ischemia.    The patient reported no chest pain during the stress test.    There were no arrhythmias during stress.    When compared to the previous study from 4/15/2021, the inferior wall defect is now present on stress and rest images. There is underlying bowel which may be interfering.  Assessment:    76 y.o. with a past medical history of COPD, HFpEF (60% on 5/4/22), pAF (eliquis), HTN, HLD presenting to clinic for follow up     Plan:     #HFpEF:  Nearly euvolemic.  Non ischemic in etiology  - continue lasix 40mg qD,  metoprolol succ 100mg qD, increase losartan to 50mg qD  - repeat labs in 1 week  - RTC in 6 months     #pAFib:  CV 6, HB 1  - AC with eliquis 5mg BID  -  continue rate control with metoprolol succ 100mg qD  - will repeat CBC given that 9mo ago she was anemic (now resolves) and on ASA and eliquis     #HTN:  BP in clinic today was  146/103  - will increase losartan to 50mg qD  - continue metoprolol, and lasix 20mg qD  - keep a BP diary for next visit     #HLD:  Last lipid panel in 6/2021: chol 140, tri 128, HDL 65, LDL 66  - continue rosuvastatin 5mg qD    Signed:  Carlos Davis MD  Cardiology Fellow  Pager - [phone]  [facility]  2/10/2023 1:26 PM

## 2023-02-15 ENCOUNTER — LAB VISIT (OUTPATIENT)
Dept: LAB | Facility: HOSPITAL | Age: 78
End: 2023-02-15
Attending: INTERNAL MEDICINE
Payer: MEDICARE

## 2023-02-15 DIAGNOSIS — I50.32 CHRONIC DIASTOLIC HEART FAILURE: ICD-10-CM

## 2023-02-15 DIAGNOSIS — E78.5 HYPERLIPIDEMIA, UNSPECIFIED HYPERLIPIDEMIA TYPE: ICD-10-CM

## 2023-02-15 DIAGNOSIS — M81.0 OSTEOPOROSIS, UNSPECIFIED OSTEOPOROSIS TYPE, UNSPECIFIED PATHOLOGICAL FRACTURE PRESENCE: ICD-10-CM

## 2023-02-15 DIAGNOSIS — J44.89 ASTHMA-COPD OVERLAP SYNDROME: ICD-10-CM

## 2023-02-15 DIAGNOSIS — I10 ESSENTIAL HYPERTENSION: ICD-10-CM

## 2023-02-15 LAB
25(OH)D3+25(OH)D2 SERPL-MCNC: 50 NG/ML (ref 30–96)
ALBUMIN SERPL BCP-MCNC: 3.4 G/DL (ref 3.5–5.2)
ALBUMIN SERPL BCP-MCNC: 3.4 G/DL (ref 3.5–5.2)
ALP SERPL-CCNC: 86 U/L (ref 55–135)
ALP SERPL-CCNC: 86 U/L (ref 55–135)
ALT SERPL W/O P-5'-P-CCNC: 24 U/L (ref 10–44)
ALT SERPL W/O P-5'-P-CCNC: 24 U/L (ref 10–44)
ANION GAP SERPL CALC-SCNC: 8 MMOL/L (ref 8–16)
ANION GAP SERPL CALC-SCNC: 8 MMOL/L (ref 8–16)
AST SERPL-CCNC: 33 U/L (ref 10–40)
AST SERPL-CCNC: 33 U/L (ref 10–40)
BASOPHILS # BLD AUTO: 0.08 K/UL (ref 0–0.2)
BASOPHILS # BLD AUTO: 0.08 K/UL (ref 0–0.2)
BASOPHILS NFR BLD: 1.1 % (ref 0–1.9)
BASOPHILS NFR BLD: 1.1 % (ref 0–1.9)
BILIRUB SERPL-MCNC: 1.1 MG/DL (ref 0.1–1)
BILIRUB SERPL-MCNC: 1.1 MG/DL (ref 0.1–1)
BUN SERPL-MCNC: 25 MG/DL (ref 8–23)
BUN SERPL-MCNC: 25 MG/DL (ref 8–23)
CALCIUM SERPL-MCNC: 10.4 MG/DL (ref 8.7–10.5)
CALCIUM SERPL-MCNC: 10.4 MG/DL (ref 8.7–10.5)
CHLORIDE SERPL-SCNC: 105 MMOL/L (ref 95–110)
CHLORIDE SERPL-SCNC: 105 MMOL/L (ref 95–110)
CHOLEST SERPL-MCNC: 150 MG/DL (ref 120–199)
CHOLEST/HDLC SERPL: 2 {RATIO} (ref 2–5)
CO2 SERPL-SCNC: 28 MMOL/L (ref 23–29)
CO2 SERPL-SCNC: 28 MMOL/L (ref 23–29)
CREAT SERPL-MCNC: 1.6 MG/DL (ref 0.5–1.4)
CREAT SERPL-MCNC: 1.6 MG/DL (ref 0.5–1.4)
DIFFERENTIAL METHOD: ABNORMAL
DIFFERENTIAL METHOD: ABNORMAL
EOSINOPHIL # BLD AUTO: 0.2 K/UL (ref 0–0.5)
EOSINOPHIL # BLD AUTO: 0.2 K/UL (ref 0–0.5)
EOSINOPHIL NFR BLD: 3.2 % (ref 0–8)
EOSINOPHIL NFR BLD: 3.2 % (ref 0–8)
ERYTHROCYTE [DISTWIDTH] IN BLOOD BY AUTOMATED COUNT: 13.5 % (ref 11.5–14.5)
ERYTHROCYTE [DISTWIDTH] IN BLOOD BY AUTOMATED COUNT: 13.5 % (ref 11.5–14.5)
EST. GFR  (NO RACE VARIABLE): 32.6 ML/MIN/1.73 M^2
EST. GFR  (NO RACE VARIABLE): 32.6 ML/MIN/1.73 M^2
GLUCOSE SERPL-MCNC: 83 MG/DL (ref 70–110)
GLUCOSE SERPL-MCNC: 83 MG/DL (ref 70–110)
HCT VFR BLD AUTO: 46.5 % (ref 37–48.5)
HCT VFR BLD AUTO: 46.5 % (ref 37–48.5)
HDLC SERPL-MCNC: 75 MG/DL (ref 40–75)
HDLC SERPL: 50 % (ref 20–50)
HGB BLD-MCNC: 14.9 G/DL (ref 12–16)
HGB BLD-MCNC: 14.9 G/DL (ref 12–16)
IMM GRANULOCYTES # BLD AUTO: 0.02 K/UL (ref 0–0.04)
IMM GRANULOCYTES # BLD AUTO: 0.02 K/UL (ref 0–0.04)
IMM GRANULOCYTES NFR BLD AUTO: 0.3 % (ref 0–0.5)
IMM GRANULOCYTES NFR BLD AUTO: 0.3 % (ref 0–0.5)
LDLC SERPL CALC-MCNC: 60.6 MG/DL (ref 63–159)
LYMPHOCYTES # BLD AUTO: 1.8 K/UL (ref 1–4.8)
LYMPHOCYTES # BLD AUTO: 1.8 K/UL (ref 1–4.8)
LYMPHOCYTES NFR BLD: 24.8 % (ref 18–48)
LYMPHOCYTES NFR BLD: 24.8 % (ref 18–48)
MCH RBC QN AUTO: 30.2 PG (ref 27–31)
MCH RBC QN AUTO: 30.2 PG (ref 27–31)
MCHC RBC AUTO-ENTMCNC: 32 G/DL (ref 32–36)
MCHC RBC AUTO-ENTMCNC: 32 G/DL (ref 32–36)
MCV RBC AUTO: 94 FL (ref 82–98)
MCV RBC AUTO: 94 FL (ref 82–98)
MONOCYTES # BLD AUTO: 1.1 K/UL (ref 0.3–1)
MONOCYTES # BLD AUTO: 1.1 K/UL (ref 0.3–1)
MONOCYTES NFR BLD: 15.1 % (ref 4–15)
MONOCYTES NFR BLD: 15.1 % (ref 4–15)
NEUTROPHILS # BLD AUTO: 4 K/UL (ref 1.8–7.7)
NEUTROPHILS # BLD AUTO: 4 K/UL (ref 1.8–7.7)
NEUTROPHILS NFR BLD: 55.5 % (ref 38–73)
NEUTROPHILS NFR BLD: 55.5 % (ref 38–73)
NONHDLC SERPL-MCNC: 75 MG/DL
NRBC BLD-RTO: 0 /100 WBC
NRBC BLD-RTO: 0 /100 WBC
PLATELET # BLD AUTO: 198 K/UL (ref 150–450)
PLATELET # BLD AUTO: 198 K/UL (ref 150–450)
PMV BLD AUTO: 9.4 FL (ref 9.2–12.9)
PMV BLD AUTO: 9.4 FL (ref 9.2–12.9)
POTASSIUM SERPL-SCNC: 4.2 MMOL/L (ref 3.5–5.1)
POTASSIUM SERPL-SCNC: 4.2 MMOL/L (ref 3.5–5.1)
PROT SERPL-MCNC: 6.4 G/DL (ref 6–8.4)
PROT SERPL-MCNC: 6.4 G/DL (ref 6–8.4)
RBC # BLD AUTO: 4.93 M/UL (ref 4–5.4)
RBC # BLD AUTO: 4.93 M/UL (ref 4–5.4)
SODIUM SERPL-SCNC: 141 MMOL/L (ref 136–145)
SODIUM SERPL-SCNC: 141 MMOL/L (ref 136–145)
TRIGL SERPL-MCNC: 72 MG/DL (ref 30–150)
WBC # BLD AUTO: 7.15 K/UL (ref 3.9–12.7)
WBC # BLD AUTO: 7.15 K/UL (ref 3.9–12.7)

## 2023-02-15 PROCEDURE — 36415 COLL VENOUS BLD VENIPUNCTURE: CPT | Performed by: INTERNAL MEDICINE

## 2023-02-15 PROCEDURE — 82306 VITAMIN D 25 HYDROXY: CPT | Performed by: INTERNAL MEDICINE

## 2023-02-15 PROCEDURE — 80053 COMPREHEN METABOLIC PANEL: CPT | Performed by: INTERNAL MEDICINE

## 2023-02-15 PROCEDURE — 85025 COMPLETE CBC W/AUTO DIFF WBC: CPT | Performed by: INTERNAL MEDICINE

## 2023-02-15 PROCEDURE — 80061 LIPID PANEL: CPT | Performed by: INTERNAL MEDICINE

## 2023-02-16 ENCOUNTER — TELEPHONE (OUTPATIENT)
Dept: CARDIOLOGY | Facility: HOSPITAL | Age: 78
End: 2023-02-16
Payer: MEDICARE

## 2023-02-16 NOTE — TELEPHONE ENCOUNTER
Called pt about her recent CMP.  Kidney function at baseline.  Continue current medication regimen    Carlos Davis, PGY5  Cardiovascular Disease  [facility]

## 2023-02-22 ENCOUNTER — OFFICE VISIT (OUTPATIENT)
Dept: INTERNAL MEDICINE | Facility: CLINIC | Age: 78
End: 2023-02-22
Payer: MEDICARE

## 2023-02-22 VITALS
DIASTOLIC BLOOD PRESSURE: 62 MMHG | BODY MASS INDEX: 29.17 KG/M2 | WEIGHT: 170.88 LBS | SYSTOLIC BLOOD PRESSURE: 136 MMHG | OXYGEN SATURATION: 96 % | HEIGHT: 64 IN | HEART RATE: 68 BPM

## 2023-02-22 DIAGNOSIS — I70.0 AORTIC ATHEROSCLEROSIS: ICD-10-CM

## 2023-02-22 DIAGNOSIS — G62.0 DRUG-INDUCED POLYNEUROPATHY: ICD-10-CM

## 2023-02-22 DIAGNOSIS — E78.5 HYPERLIPIDEMIA, UNSPECIFIED HYPERLIPIDEMIA TYPE: ICD-10-CM

## 2023-02-22 DIAGNOSIS — Z79.01 CHRONIC ANTICOAGULATION: ICD-10-CM

## 2023-02-22 DIAGNOSIS — I10 ESSENTIAL HYPERTENSION: ICD-10-CM

## 2023-02-22 DIAGNOSIS — N18.32 STAGE 3B CHRONIC KIDNEY DISEASE: ICD-10-CM

## 2023-02-22 DIAGNOSIS — J43.2 CENTRILOBULAR EMPHYSEMA: ICD-10-CM

## 2023-02-22 DIAGNOSIS — I50.32 CHRONIC DIASTOLIC HEART FAILURE: ICD-10-CM

## 2023-02-22 DIAGNOSIS — J44.89 ASTHMA-COPD OVERLAP SYNDROME: ICD-10-CM

## 2023-02-22 DIAGNOSIS — N25.81 SECONDARY HYPERPARATHYROIDISM OF RENAL ORIGIN: ICD-10-CM

## 2023-02-22 DIAGNOSIS — D69.2 SENILE PURPURA: ICD-10-CM

## 2023-02-22 DIAGNOSIS — I48.0 PAROXYSMAL ATRIAL FIBRILLATION: ICD-10-CM

## 2023-02-22 PROCEDURE — 1160F PR REVIEW ALL MEDS BY PRESCRIBER/CLIN PHARMACIST DOCUMENTED: ICD-10-PCS | Mod: CPTII,S$GLB,, | Performed by: INTERNAL MEDICINE

## 2023-02-22 PROCEDURE — 1126F PR PAIN SEVERITY QUANTIFIED, NO PAIN PRESENT: ICD-10-PCS | Mod: CPTII,S$GLB,, | Performed by: INTERNAL MEDICINE

## 2023-02-22 PROCEDURE — 1159F PR MEDICATION LIST DOCUMENTED IN MEDICAL RECORD: ICD-10-PCS | Mod: CPTII,S$GLB,, | Performed by: INTERNAL MEDICINE

## 2023-02-22 PROCEDURE — 99999 PR PBB SHADOW E&M-EST. PATIENT-LVL IV: ICD-10-PCS | Mod: PBBFAC,,, | Performed by: INTERNAL MEDICINE

## 2023-02-22 PROCEDURE — 99214 PR OFFICE/OUTPT VISIT, EST, LEVL IV, 30-39 MIN: ICD-10-PCS | Mod: S$GLB,,, | Performed by: INTERNAL MEDICINE

## 2023-02-22 PROCEDURE — 1101F PR PT FALLS ASSESS DOC 0-1 FALLS W/OUT INJ PAST YR: ICD-10-PCS | Mod: CPTII,S$GLB,, | Performed by: INTERNAL MEDICINE

## 2023-02-22 PROCEDURE — 3075F SYST BP GE 130 - 139MM HG: CPT | Mod: CPTII,S$GLB,, | Performed by: INTERNAL MEDICINE

## 2023-02-22 PROCEDURE — 99214 OFFICE O/P EST MOD 30 MIN: CPT | Mod: S$GLB,,, | Performed by: INTERNAL MEDICINE

## 2023-02-22 PROCEDURE — 1159F MED LIST DOCD IN RCRD: CPT | Mod: CPTII,S$GLB,, | Performed by: INTERNAL MEDICINE

## 2023-02-22 PROCEDURE — 1101F PT FALLS ASSESS-DOCD LE1/YR: CPT | Mod: CPTII,S$GLB,, | Performed by: INTERNAL MEDICINE

## 2023-02-22 PROCEDURE — 99999 PR PBB SHADOW E&M-EST. PATIENT-LVL IV: CPT | Mod: PBBFAC,,, | Performed by: INTERNAL MEDICINE

## 2023-02-22 PROCEDURE — 1160F RVW MEDS BY RX/DR IN RCRD: CPT | Mod: CPTII,S$GLB,, | Performed by: INTERNAL MEDICINE

## 2023-02-22 PROCEDURE — 1126F AMNT PAIN NOTED NONE PRSNT: CPT | Mod: CPTII,S$GLB,, | Performed by: INTERNAL MEDICINE

## 2023-02-22 PROCEDURE — 3288F FALL RISK ASSESSMENT DOCD: CPT | Mod: CPTII,S$GLB,, | Performed by: INTERNAL MEDICINE

## 2023-02-22 PROCEDURE — 3075F PR MOST RECENT SYSTOLIC BLOOD PRESS GE 130-139MM HG: ICD-10-PCS | Mod: CPTII,S$GLB,, | Performed by: INTERNAL MEDICINE

## 2023-02-22 PROCEDURE — 3078F PR MOST RECENT DIASTOLIC BLOOD PRESSURE < 80 MM HG: ICD-10-PCS | Mod: CPTII,S$GLB,, | Performed by: INTERNAL MEDICINE

## 2023-02-22 PROCEDURE — 3078F DIAST BP <80 MM HG: CPT | Mod: CPTII,S$GLB,, | Performed by: INTERNAL MEDICINE

## 2023-02-22 PROCEDURE — 3288F PR FALLS RISK ASSESSMENT DOCUMENTED: ICD-10-PCS | Mod: CPTII,S$GLB,, | Performed by: INTERNAL MEDICINE

## 2023-02-22 RX ORDER — FUROSEMIDE 40 MG/1
40 TABLET ORAL DAILY PRN
Qty: 90 TABLET | Refills: 1 | Status: SHIPPED | OUTPATIENT
Start: 2023-02-22 | End: 2023-08-22 | Stop reason: SDUPTHER

## 2023-02-22 RX ORDER — LOSARTAN POTASSIUM 50 MG/1
50 TABLET ORAL DAILY
COMMUNITY
End: 2023-03-29 | Stop reason: SDUPTHER

## 2023-02-22 NOTE — PROGRESS NOTES
Patient ID: Misa Barajas is a 77 y.o. female.    Chief Complaint: Hypertension    HPI Misa is a 77 y.o. female with HTN, HLD, aortic atherosclerosis, atrial fibrillation, chronic kidney disease stage 3, chronic diastolic CHF, osteoporosis and asthma/ COPD who presents for routine follow up of medical conditions. She follows with cardiology and they recently increased her losartan dose due to uncontrolled HTN. Has not seen Pulm or Nephro since last visit with me. No acute complaints. Reviewed lab results from 2/15/23.     Health Maintenance Topics with due status: Not Due       Topic Last Completion Date    TETANUS VACCINE 08/08/2016    Lipid Panel 02/15/2023        Review of Systems   All other systems reviewed and are negative.        Objective:     Vitals:    02/22/23 0912   BP: 136/62   Pulse: 68        Physical Exam  Vitals reviewed.   Constitutional:       General: She is not in acute distress.     Appearance: Normal appearance. She is well-developed. She is not ill-appearing, toxic-appearing or diaphoretic.   HENT:      Head: Normocephalic and atraumatic.      Right Ear: External ear normal.      Left Ear: External ear normal.      Nose: Nose normal.   Eyes:      General: No scleral icterus.        Right eye: No discharge.         Left eye: No discharge.      Extraocular Movements: Extraocular movements intact.      Conjunctiva/sclera: Conjunctivae normal.   Cardiovascular:      Rate and Rhythm: Normal rate and regular rhythm.      Heart sounds: Normal heart sounds. No murmur heard.    No friction rub. No gallop.   Pulmonary:      Effort: Pulmonary effort is normal. No respiratory distress.      Breath sounds: Normal breath sounds. No stridor. No wheezing, rhonchi or rales.   Abdominal:      General: Bowel sounds are normal. There is no distension.      Palpations: Abdomen is soft. There is no mass.      Tenderness: There is no abdominal tenderness. There is no guarding or rebound.      Hernia: No  hernia is present.   Musculoskeletal:      Right lower leg: No edema.      Left lower leg: No edema.   Lymphadenopathy:      Cervical: No cervical adenopathy.   Skin:     General: Skin is warm and dry.   Neurological:      General: No focal deficit present.      Mental Status: She is alert and oriented to person, place, and time. Mental status is at baseline.   Psychiatric:         Mood and Affect: Mood normal.         Behavior: Behavior normal.         Thought Content: Thought content normal.         Judgment: Judgment normal.       Assessment:       1. Chronic diastolic heart failure Well controlled   2. Secondary hyperparathyroidism of renal origin Chronic   3. Senile purpura Chronic   4. Centrilobular emphysema Well controlled   5. Drug-induced polyneuropathy Chronic   6. Essential hypertension Well controlled   7. Chronic anticoagulation Chronic   8. Hyperlipidemia, unspecified hyperlipidemia type Well controlled   9. Asthma-COPD overlap syndrome Well controlled   10. Aortic atherosclerosis Chronic   11. Paroxysmal atrial fibrillation Well controlled   12. Stage 3b chronic kidney disease Chronic       Plan:         Chronic diastolic heart failure  Comments:  Cont current medications and continue to follow with cardiology   Orders:  -     furosemide (LASIX) 40 MG tablet; Take 1 tablet (40 mg total) by mouth daily as needed (leg swelling).  Dispense: 90 tablet; Refill: 1    Secondary hyperparathyroidism of renal origin    Senile purpura    Centrilobular emphysema  Comments:  Cont current medications and f/u with Pulm    Drug-induced polyneuropathy    Essential hypertension  Comments:  Cont current medication  Orders:  -     Comprehensive Metabolic Panel; Future; Expected date: 08/22/2023    Chronic anticoagulation  -     CBC Auto Differential; Future; Expected date: 08/22/2023    Hyperlipidemia, unspecified hyperlipidemia type  Comments:  Cont current medication  Orders:  -     Lipid Panel; Future; Expected date:  08/22/2023    Asthma-COPD overlap syndrome  Comments:  Cont current medications and f/u with Pulm    Aortic atherosclerosis  Comments:  cont aspirin and statin    Paroxysmal atrial fibrillation  Comments:  Cont current medications and f/u with cardiology    Stage 3b chronic kidney disease  Comments:  cont to monitor         RTC 6 months for annual exam    Warning signs discussed, patient to call with any further issues or worsening of symptoms.       Parts of the above note were dictated using a voice dictation software. Please excuse any grammatical or typographical errors.

## 2023-03-13 ENCOUNTER — PES CALL (OUTPATIENT)
Dept: ADMINISTRATIVE | Facility: CLINIC | Age: 78
End: 2023-03-13
Payer: MEDICARE

## 2023-03-24 DIAGNOSIS — I10 ESSENTIAL HYPERTENSION: ICD-10-CM

## 2023-03-24 RX ORDER — LOSARTAN POTASSIUM 25 MG/1
25 TABLET ORAL DAILY
Qty: 90 TABLET | Refills: 0 | Status: SHIPPED | OUTPATIENT
Start: 2023-03-24 | End: 2023-03-28

## 2023-03-24 NOTE — TELEPHONE ENCOUNTER
No new care gaps identified.  [facility] Embedded Care Gaps. Reference number: [id number]. 3/24/2023   8:23:36 AM KELSIET

## 2023-03-24 NOTE — TELEPHONE ENCOUNTER
Refill Routing Note   Refill Routing Note   Medication(s) are not appropriate for processing by [facility] for the following reason(s):      Required labs abnormal  Clarification of medication (Rx) details    [facility] action(s):  Defer          Medication reconciliation completed: No     Appointments  past 12m or future 3m with PCP    Date Provider   Last Visit   2/22/2023 Celia Carlisle MD   Next Visit   8/22/2023 Celia Carlisle MD   ED visits in past 90 days: 0        Note composed:10:26 AM 03/24/2023

## 2023-03-28 DIAGNOSIS — I50.32 CHRONIC DIASTOLIC HEART FAILURE: ICD-10-CM

## 2023-03-28 RX ORDER — LOSARTAN POTASSIUM 50 MG/1
50 TABLET ORAL DAILY
Qty: 90 TABLET | Refills: 3 | OUTPATIENT
Start: 2023-03-28 | End: 2024-03-27

## 2023-03-28 NOTE — TELEPHONE ENCOUNTER
Refill Routing Note   Medication(s) are not appropriate for processing by [facility] for the following reason(s):      Required labs abnormal  No active prescription written by PCP    [facility] action(s):  Defer            Appointments  past 12m or future 3m with PCP    Date Provider   Last Visit   2/22/2023 Celia Carlisle MD   Next Visit   8/22/2023 Celia Carlisle MD   ED visits in past 90 days: 0        Note composed:12:11 PM 03/28/2023

## 2023-03-28 NOTE — TELEPHONE ENCOUNTER
----- Message from Christy Michelle sent at 3/28/2023  8:22 AM CDT -----  Type:  Needs Medical Advice    Who Called: self  Reason:speak with nurse regarding having issues getting her losartan filled   Would the patient rather a call back or a response via [employer]? call  Best Call Back Number: [phone]  Additional Information: none

## 2023-03-28 NOTE — TELEPHONE ENCOUNTER
No new care gaps identified.  [facility] Embedded Care Gaps. Reference number: [id number]. 3/28/2023   9:49:47 AM KELSIET

## 2023-03-29 RX ORDER — LOSARTAN POTASSIUM 50 MG/1
50 TABLET ORAL DAILY
Qty: 90 TABLET | Refills: 1 | Status: SHIPPED | OUTPATIENT
Start: 2023-03-29 | End: 2023-08-22 | Stop reason: SDUPTHER

## 2023-04-03 ENCOUNTER — TELEPHONE (OUTPATIENT)
Dept: ADMINISTRATIVE | Facility: CLINIC | Age: 78
End: 2023-04-03
Payer: MEDICARE

## 2023-04-03 PROBLEM — I50.32 CHRONIC DIASTOLIC HEART FAILURE: Status: RESOLVED | Noted: 2021-12-05 | Resolved: 2023-04-03

## 2023-04-03 PROBLEM — M85.80 OSTEOPENIA: Status: RESOLVED | Noted: 2017-07-10 | Resolved: 2023-04-03

## 2023-05-07 NOTE — NURSING
This nurse was called to room due to pt stating that she did not feel well. Vitals showed HR in the low 40s, all other WNL. Pt was experiencing intermittent palpitations accompanied by nausea. Provider notified, pt placed on tele monitoring, showing Afib. Stat EKG and troponin level ordered. Zofran ordered for nausea. EKG resulted similar to previous readings. Trop came back 0.027, will repeat lab in am to trend level. Post med administration, pt resting comfortably.    Alert and interactive, no focal deficits

## 2023-06-06 ENCOUNTER — OFFICE VISIT (OUTPATIENT)
Dept: HOME HEALTH SERVICES | Facility: CLINIC | Age: 78
End: 2023-06-06
Payer: MEDICARE

## 2023-06-06 VITALS
DIASTOLIC BLOOD PRESSURE: 80 MMHG | TEMPERATURE: 97 F | OXYGEN SATURATION: 94 % | HEIGHT: 64 IN | WEIGHT: 164.63 LBS | BODY MASS INDEX: 28.1 KG/M2 | RESPIRATION RATE: 16 BRPM | HEART RATE: 64 BPM | SYSTOLIC BLOOD PRESSURE: 140 MMHG

## 2023-06-06 DIAGNOSIS — E66.3 OVERWEIGHT WITH BODY MASS INDEX (BMI) OF 27 TO 27.9 IN ADULT: ICD-10-CM

## 2023-06-06 DIAGNOSIS — Z00.00 ENCOUNTER FOR PREVENTIVE HEALTH EXAMINATION: Primary | ICD-10-CM

## 2023-06-06 DIAGNOSIS — D69.2 SENILE PURPURA: ICD-10-CM

## 2023-06-06 DIAGNOSIS — I10 ESSENTIAL HYPERTENSION: ICD-10-CM

## 2023-06-06 DIAGNOSIS — J96.11 CHRONIC RESPIRATORY FAILURE WITH HYPOXIA: ICD-10-CM

## 2023-06-06 DIAGNOSIS — I70.0 AORTIC ATHEROSCLEROSIS: ICD-10-CM

## 2023-06-06 DIAGNOSIS — J44.89 ASTHMA-COPD OVERLAP SYNDROME: ICD-10-CM

## 2023-06-06 DIAGNOSIS — I50.33 ACUTE ON CHRONIC DIASTOLIC HEART FAILURE: ICD-10-CM

## 2023-06-06 DIAGNOSIS — I48.0 PAROXYSMAL ATRIAL FIBRILLATION: ICD-10-CM

## 2023-06-06 DIAGNOSIS — J84.10 CALCIFIED GRANULOMA OF LUNG: ICD-10-CM

## 2023-06-06 DIAGNOSIS — N18.32 STAGE 3B CHRONIC KIDNEY DISEASE: ICD-10-CM

## 2023-06-06 DIAGNOSIS — Z99.81 DEPENDENCE ON SUPPLEMENTAL OXYGEN: ICD-10-CM

## 2023-06-06 DIAGNOSIS — J43.2 CENTRILOBULAR EMPHYSEMA: ICD-10-CM

## 2023-06-06 DIAGNOSIS — M81.0 AGE-RELATED OSTEOPOROSIS WITHOUT CURRENT PATHOLOGICAL FRACTURE: ICD-10-CM

## 2023-06-06 DIAGNOSIS — N25.81 SECONDARY HYPERPARATHYROIDISM OF RENAL ORIGIN: ICD-10-CM

## 2023-06-06 DIAGNOSIS — G62.0 DRUG-INDUCED POLYNEUROPATHY: ICD-10-CM

## 2023-06-06 DIAGNOSIS — E78.5 HYPERLIPIDEMIA, UNSPECIFIED HYPERLIPIDEMIA TYPE: ICD-10-CM

## 2023-06-06 PROCEDURE — 3288F PR FALLS RISK ASSESSMENT DOCUMENTED: ICD-10-PCS | Mod: CPTII,S$GLB,,

## 2023-06-06 PROCEDURE — 1160F RVW MEDS BY RX/DR IN RCRD: CPT | Mod: CPTII,S$GLB,,

## 2023-06-06 PROCEDURE — 1160F PR REVIEW ALL MEDS BY PRESCRIBER/CLIN PHARMACIST DOCUMENTED: ICD-10-PCS | Mod: CPTII,S$GLB,,

## 2023-06-06 PROCEDURE — G0439 PR MEDICARE ANNUAL WELLNESS SUBSEQUENT VISIT: ICD-10-PCS | Mod: S$GLB,,,

## 2023-06-06 PROCEDURE — 3288F FALL RISK ASSESSMENT DOCD: CPT | Mod: CPTII,S$GLB,,

## 2023-06-06 PROCEDURE — 1170F FXNL STATUS ASSESSED: CPT | Mod: CPTII,S$GLB,,

## 2023-06-06 PROCEDURE — 3079F PR MOST RECENT DIASTOLIC BLOOD PRESSURE 80-89 MM HG: ICD-10-PCS | Mod: CPTII,S$GLB,,

## 2023-06-06 PROCEDURE — G0439 PPPS, SUBSEQ VISIT: HCPCS | Mod: S$GLB,,,

## 2023-06-06 PROCEDURE — 1159F MED LIST DOCD IN RCRD: CPT | Mod: CPTII,S$GLB,,

## 2023-06-06 PROCEDURE — 1101F PR PT FALLS ASSESS DOC 0-1 FALLS W/OUT INJ PAST YR: ICD-10-PCS | Mod: CPTII,S$GLB,,

## 2023-06-06 PROCEDURE — 1170F PR FUNCTIONAL STATUS ASSESSED: ICD-10-PCS | Mod: CPTII,S$GLB,,

## 2023-06-06 PROCEDURE — 3079F DIAST BP 80-89 MM HG: CPT | Mod: CPTII,S$GLB,,

## 2023-06-06 PROCEDURE — 1126F PR PAIN SEVERITY QUANTIFIED, NO PAIN PRESENT: ICD-10-PCS | Mod: CPTII,S$GLB,,

## 2023-06-06 PROCEDURE — 1101F PT FALLS ASSESS-DOCD LE1/YR: CPT | Mod: CPTII,S$GLB,,

## 2023-06-06 PROCEDURE — 3077F SYST BP >= 140 MM HG: CPT | Mod: CPTII,S$GLB,,

## 2023-06-06 PROCEDURE — 3077F PR MOST RECENT SYSTOLIC BLOOD PRESSURE >= 140 MM HG: ICD-10-PCS | Mod: CPTII,S$GLB,,

## 2023-06-06 PROCEDURE — 1159F PR MEDICATION LIST DOCUMENTED IN MEDICAL RECORD: ICD-10-PCS | Mod: CPTII,S$GLB,,

## 2023-06-06 PROCEDURE — 1126F AMNT PAIN NOTED NONE PRSNT: CPT | Mod: CPTII,S$GLB,,

## 2023-06-06 NOTE — PATIENT INSTRUCTIONS
Counseling and Referral of Other Preventative  (Italic type indicates deductible and co-insurance are waived)    Patient Name: Misa Barajas  Today's Date: 6/6/2023    Health Maintenance       Date Due Completion Date    DEXA Scan 08/30/2021 8/30/2019    Mammogram 09/15/2021 9/15/2020    COVID-19 Vaccine (4 - Pfizer series) 02/04/2022 12/10/2021    LDCT Lung Screen 08/22/2023 (Originally 6/28/2022) 6/28/2021    TETANUS VACCINE 08/08/2026 8/8/2016    Lipid Panel 02/15/2028 2/15/2023        No orders of the defined types were placed in this encounter.    The following information is provided to all patients.  This information is to help you find resources for any of the problems found today that may be affecting your health:                Living healthy guide: www.[facility].[place].gov      Understanding Diabetes: www.diabetes.org      Eating healthy: www.cdc.gov/healthyweight      [facility] home safety checklist: www.cdc.gov/steadi/patient.html      Agency on Aging: www.goea.[place].[facility]      Alcoholics anonymous (AA): www.aa.org      Physical Activity: www.ayesha.nih.gov/xb2swai      Tobacco use: www.quitwithusla.org      no chest pain and no edema.

## 2023-06-06 NOTE — PROGRESS NOTES
"Misa Barajas presented for a  Medicare AWV and comprehensive Health Risk Assessment today. The following components were reviewed and updated:    Medical history  Family History  Social history  Allergies and Current Medications  Health Risk Assessment  Health Maintenance  Care Team         ** See Completed Assessments for Annual Wellness Visit within the encounter summary.**         The following assessments were completed:  Living Situation  CAGE  Depression Screening  Timed Get Up and Go  Whisper Test  Cognitive Function Screening    Nutrition Screening  ADL Screening  PAQ Screening        Vitals:    06/06/23 1015   BP: (!) 140/80   BP Location: Left arm   Patient Position: Sitting   Pulse: 64   Resp: 16   Temp: 97 °F (36.1 °C)   SpO2: (!) 94%   Weight: 74.7 kg (164 lb 9.6 oz)   Height: 5' 4" (1.626 m)     Body mass index is 28.25 kg/m².    Physical Exam  Vitals reviewed.   Constitutional:       General: She is not in acute distress.     Appearance: Normal appearance.   Cardiovascular:      Rate and Rhythm: Normal rate and regular rhythm.      Pulses: Normal pulses.      Heart sounds: No murmur heard.  Pulmonary:      Effort: Pulmonary effort is normal. No respiratory distress.      Breath sounds: Normal breath sounds.   Abdominal:      General: Bowel sounds are normal.   Musculoskeletal:      Right lower leg: No edema.      Left lower leg: No edema.   Skin:     Findings: Bruising present.   Neurological:      General: No focal deficit present.      Mental Status: She is alert and oriented to person, place, and time.   Psychiatric:         Mood and Affect: Mood normal.         Behavior: Behavior normal.               Diagnoses and health risks identified today and associated recommendations/orders:    1. Encounter for preventive health examination  Assessments completed.  HM recommendations reviewed.  F/u with PCP as instructed.     Patient not on chronic opioids.   Risk factors reviewed for any potential " opioid use disorder   Pain evaluated during visit.  Current treatment plan documented.  Will refer to specialist, as appropriate.      2. Stage 3b chronic kidney disease  Chronic, stable on current regimen. Followed by PCP.     3. Calcified granuloma of lung  Chronic, stable on current regimen. Followed by PCP.     4. Centrilobular emphysema  Chronic, stable on current Breztri regimen. Followed by PCP.     5. Chronic respiratory failure with hypoxia  Chronic, stable on current Breztri and home oxygen regimen. Followed by PCP.     6. Asthma-COPD overlap syndrome  Chronic, stable on current Breztri regimen. Followed by PCP.     7. Aortic atherosclerosis  Chronic, stable on current statin regimen. Followed by PCP.     8. Paroxysmal atrial fibrillation  Chronic, stable on current Eliquis regimen. Followed by PCP.     9. Acute on chronic diastolic heart failure  Chronic, stable on current Metoprolol regimen. Followed by PCP.     10. Drug-induced polyneuropathy  Chronic, stable on current regimen. Followed by PCP.     11. Senile purpura  Chronic, stable on current regimen. Followed by PCP.     12. Secondary hyperparathyroidism of renal origin  Chronic, stable on current regimen. Followed by PCP.     13. Essential hypertension  Chronic, stable on current Losartan, Metoprolol regimen. Followed by PCP.     14. Hyperlipidemia, unspecified hyperlipidemia type  Chronic, stable on current statin regimen. Followed by PCP.     15. Overweight with body mass index (BMI) of 27 to 27.9 in adult  Chronic, stable on current regimen. Followed by PCP.     16. Age-related osteoporosis without current pathological fracture  Chronic, stable on current Vit D regimen. Followed by PCP.    17. Dependence on supplemental oxygen  Uses 2L prn.       Provided Misa with a 5-10 year written screening schedule and personal prevention plan. Recommendations were developed using the USPSTF age appropriate recommendations. Education, counseling, and  referrals were provided as needed. After Visit Summary printed and given to patient which includes a list of additional screenings\tests needed.    Follow up in about 1 year (around 6/6/2024) for Medicare AWV.    Bette Sanders NP    I offered to discuss advanced care planning, including how to pick a person who would make decisions for you if you were unable to make them for yourself, called a health care power of , and what kind of decisions you might make such as use of life sustaining treatments such as ventilators and tube feeding when faced with a life limiting illness recorded on a living will that they will need to know. (How you want to be cared for as you near the end of your natural life)     X  Patient has advanced directives written and agrees to provide copies to the institution.

## 2023-06-07 ENCOUNTER — PATIENT MESSAGE (OUTPATIENT)
Dept: INTERNAL MEDICINE | Facility: CLINIC | Age: 78
End: 2023-06-07
Payer: MEDICARE

## 2023-08-21 ENCOUNTER — LAB VISIT (OUTPATIENT)
Dept: LAB | Facility: HOSPITAL | Age: 78
End: 2023-08-21
Attending: INTERNAL MEDICINE
Payer: MEDICARE

## 2023-08-21 DIAGNOSIS — I10 ESSENTIAL HYPERTENSION: ICD-10-CM

## 2023-08-21 DIAGNOSIS — Z79.01 CHRONIC ANTICOAGULATION: ICD-10-CM

## 2023-08-21 DIAGNOSIS — E78.5 HYPERLIPIDEMIA, UNSPECIFIED HYPERLIPIDEMIA TYPE: ICD-10-CM

## 2023-08-21 LAB
ALBUMIN SERPL BCP-MCNC: 3.1 G/DL (ref 3.5–5.2)
ALP SERPL-CCNC: 96 U/L (ref 55–135)
ALT SERPL W/O P-5'-P-CCNC: 23 U/L (ref 10–44)
ANION GAP SERPL CALC-SCNC: 9 MMOL/L (ref 8–16)
AST SERPL-CCNC: 30 U/L (ref 10–40)
BASOPHILS # BLD AUTO: 0.08 K/UL (ref 0–0.2)
BASOPHILS NFR BLD: 1.1 % (ref 0–1.9)
BILIRUB SERPL-MCNC: 0.8 MG/DL (ref 0.1–1)
BUN SERPL-MCNC: 19 MG/DL (ref 8–23)
CALCIUM SERPL-MCNC: 10 MG/DL (ref 8.7–10.5)
CHLORIDE SERPL-SCNC: 107 MMOL/L (ref 95–110)
CHOLEST SERPL-MCNC: 148 MG/DL (ref 120–199)
CHOLEST/HDLC SERPL: 2.2 {RATIO} (ref 2–5)
CO2 SERPL-SCNC: 27 MMOL/L (ref 23–29)
CREAT SERPL-MCNC: 1.5 MG/DL (ref 0.5–1.4)
DIFFERENTIAL METHOD: NORMAL
EOSINOPHIL # BLD AUTO: 0.2 K/UL (ref 0–0.5)
EOSINOPHIL NFR BLD: 3.1 % (ref 0–8)
ERYTHROCYTE [DISTWIDTH] IN BLOOD BY AUTOMATED COUNT: 13.8 % (ref 11.5–14.5)
EST. GFR  (NO RACE VARIABLE): 35.2 ML/MIN/1.73 M^2
GLUCOSE SERPL-MCNC: 84 MG/DL (ref 70–110)
HCT VFR BLD AUTO: 45.5 % (ref 37–48.5)
HDLC SERPL-MCNC: 66 MG/DL (ref 40–75)
HDLC SERPL: 44.6 % (ref 20–50)
HGB BLD-MCNC: 14.8 G/DL (ref 12–16)
IMM GRANULOCYTES # BLD AUTO: 0.02 K/UL (ref 0–0.04)
IMM GRANULOCYTES NFR BLD AUTO: 0.3 % (ref 0–0.5)
LDLC SERPL CALC-MCNC: 67 MG/DL (ref 63–159)
LYMPHOCYTES # BLD AUTO: 2.2 K/UL (ref 1–4.8)
LYMPHOCYTES NFR BLD: 28.9 % (ref 18–48)
MCH RBC QN AUTO: 30.7 PG (ref 27–31)
MCHC RBC AUTO-ENTMCNC: 32.5 G/DL (ref 32–36)
MCV RBC AUTO: 94 FL (ref 82–98)
MONOCYTES # BLD AUTO: 0.9 K/UL (ref 0.3–1)
MONOCYTES NFR BLD: 11.6 % (ref 4–15)
NEUTROPHILS # BLD AUTO: 4.1 K/UL (ref 1.8–7.7)
NEUTROPHILS NFR BLD: 55 % (ref 38–73)
NONHDLC SERPL-MCNC: 82 MG/DL
NRBC BLD-RTO: 0 /100 WBC
PLATELET # BLD AUTO: 212 K/UL (ref 150–450)
PMV BLD AUTO: 9.9 FL (ref 9.2–12.9)
POTASSIUM SERPL-SCNC: 4.1 MMOL/L (ref 3.5–5.1)
PROT SERPL-MCNC: 6.2 G/DL (ref 6–8.4)
RBC # BLD AUTO: 4.82 M/UL (ref 4–5.4)
SODIUM SERPL-SCNC: 143 MMOL/L (ref 136–145)
TRIGL SERPL-MCNC: 75 MG/DL (ref 30–150)
WBC # BLD AUTO: 7.44 K/UL (ref 3.9–12.7)

## 2023-08-21 PROCEDURE — 80053 COMPREHEN METABOLIC PANEL: CPT | Performed by: INTERNAL MEDICINE

## 2023-08-21 PROCEDURE — 36415 COLL VENOUS BLD VENIPUNCTURE: CPT | Mod: PO | Performed by: INTERNAL MEDICINE

## 2023-08-21 PROCEDURE — 80061 LIPID PANEL: CPT | Performed by: INTERNAL MEDICINE

## 2023-08-21 PROCEDURE — 85025 COMPLETE CBC W/AUTO DIFF WBC: CPT | Performed by: INTERNAL MEDICINE

## 2023-08-22 ENCOUNTER — OFFICE VISIT (OUTPATIENT)
Dept: INTERNAL MEDICINE | Facility: CLINIC | Age: 78
End: 2023-08-22
Payer: MEDICARE

## 2023-08-22 VITALS
HEIGHT: 64 IN | SYSTOLIC BLOOD PRESSURE: 122 MMHG | OXYGEN SATURATION: 86 % | HEART RATE: 66 BPM | BODY MASS INDEX: 28.86 KG/M2 | DIASTOLIC BLOOD PRESSURE: 60 MMHG | WEIGHT: 169.06 LBS

## 2023-08-22 DIAGNOSIS — M81.0 OSTEOPOROSIS, UNSPECIFIED OSTEOPOROSIS TYPE, UNSPECIFIED PATHOLOGICAL FRACTURE PRESENCE: ICD-10-CM

## 2023-08-22 DIAGNOSIS — I10 ESSENTIAL HYPERTENSION: ICD-10-CM

## 2023-08-22 DIAGNOSIS — I50.32 CHRONIC DIASTOLIC HEART FAILURE: ICD-10-CM

## 2023-08-22 DIAGNOSIS — E55.9 VITAMIN D INSUFFICIENCY: ICD-10-CM

## 2023-08-22 DIAGNOSIS — I70.0 AORTIC ATHEROSCLEROSIS: ICD-10-CM

## 2023-08-22 DIAGNOSIS — Z79.01 CHRONIC ANTICOAGULATION: ICD-10-CM

## 2023-08-22 DIAGNOSIS — E78.5 HYPERLIPIDEMIA, UNSPECIFIED HYPERLIPIDEMIA TYPE: ICD-10-CM

## 2023-08-22 DIAGNOSIS — I48.0 PAROXYSMAL ATRIAL FIBRILLATION: ICD-10-CM

## 2023-08-22 DIAGNOSIS — N18.32 STAGE 3B CHRONIC KIDNEY DISEASE: ICD-10-CM

## 2023-08-22 DIAGNOSIS — J44.89 ASTHMA-COPD OVERLAP SYNDROME: ICD-10-CM

## 2023-08-22 PROCEDURE — 1126F AMNT PAIN NOTED NONE PRSNT: CPT | Mod: CPTII,S$GLB,, | Performed by: INTERNAL MEDICINE

## 2023-08-22 PROCEDURE — 3078F PR MOST RECENT DIASTOLIC BLOOD PRESSURE < 80 MM HG: ICD-10-PCS | Mod: CPTII,S$GLB,, | Performed by: INTERNAL MEDICINE

## 2023-08-22 PROCEDURE — 3078F DIAST BP <80 MM HG: CPT | Mod: CPTII,S$GLB,, | Performed by: INTERNAL MEDICINE

## 2023-08-22 PROCEDURE — 1159F PR MEDICATION LIST DOCUMENTED IN MEDICAL RECORD: ICD-10-PCS | Mod: CPTII,S$GLB,, | Performed by: INTERNAL MEDICINE

## 2023-08-22 PROCEDURE — 3074F SYST BP LT 130 MM HG: CPT | Mod: CPTII,S$GLB,, | Performed by: INTERNAL MEDICINE

## 2023-08-22 PROCEDURE — 3288F FALL RISK ASSESSMENT DOCD: CPT | Mod: CPTII,S$GLB,, | Performed by: INTERNAL MEDICINE

## 2023-08-22 PROCEDURE — 1159F MED LIST DOCD IN RCRD: CPT | Mod: CPTII,S$GLB,, | Performed by: INTERNAL MEDICINE

## 2023-08-22 PROCEDURE — 3074F PR MOST RECENT SYSTOLIC BLOOD PRESSURE < 130 MM HG: ICD-10-PCS | Mod: CPTII,S$GLB,, | Performed by: INTERNAL MEDICINE

## 2023-08-22 PROCEDURE — 3288F PR FALLS RISK ASSESSMENT DOCUMENTED: ICD-10-PCS | Mod: CPTII,S$GLB,, | Performed by: INTERNAL MEDICINE

## 2023-08-22 PROCEDURE — 99999 PR PBB SHADOW E&M-EST. PATIENT-LVL III: ICD-10-PCS | Mod: PBBFAC,,, | Performed by: INTERNAL MEDICINE

## 2023-08-22 PROCEDURE — 1126F PR PAIN SEVERITY QUANTIFIED, NO PAIN PRESENT: ICD-10-PCS | Mod: CPTII,S$GLB,, | Performed by: INTERNAL MEDICINE

## 2023-08-22 PROCEDURE — 1160F PR REVIEW ALL MEDS BY PRESCRIBER/CLIN PHARMACIST DOCUMENTED: ICD-10-PCS | Mod: CPTII,S$GLB,, | Performed by: INTERNAL MEDICINE

## 2023-08-22 PROCEDURE — 99999 PR PBB SHADOW E&M-EST. PATIENT-LVL III: CPT | Mod: PBBFAC,,, | Performed by: INTERNAL MEDICINE

## 2023-08-22 PROCEDURE — 1101F PR PT FALLS ASSESS DOC 0-1 FALLS W/OUT INJ PAST YR: ICD-10-PCS | Mod: CPTII,S$GLB,, | Performed by: INTERNAL MEDICINE

## 2023-08-22 PROCEDURE — 1160F RVW MEDS BY RX/DR IN RCRD: CPT | Mod: CPTII,S$GLB,, | Performed by: INTERNAL MEDICINE

## 2023-08-22 PROCEDURE — 99214 OFFICE O/P EST MOD 30 MIN: CPT | Mod: S$GLB,,, | Performed by: INTERNAL MEDICINE

## 2023-08-22 PROCEDURE — 1101F PT FALLS ASSESS-DOCD LE1/YR: CPT | Mod: CPTII,S$GLB,, | Performed by: INTERNAL MEDICINE

## 2023-08-22 PROCEDURE — 99214 PR OFFICE/OUTPT VISIT, EST, LEVL IV, 30-39 MIN: ICD-10-PCS | Mod: S$GLB,,, | Performed by: INTERNAL MEDICINE

## 2023-08-22 RX ORDER — FUROSEMIDE 40 MG/1
40 TABLET ORAL DAILY PRN
Qty: 90 TABLET | Refills: 1 | Status: ON HOLD | OUTPATIENT
Start: 2023-08-22 | End: 2024-03-03

## 2023-08-22 RX ORDER — BUDESONIDE, GLYCOPYRROLATE, AND FORMOTEROL FUMARATE 160; 9; 4.8 UG/1; UG/1; UG/1
2 AEROSOL, METERED RESPIRATORY (INHALATION) 2 TIMES DAILY
Qty: 32.1 G | Refills: 3 | Status: SHIPPED | OUTPATIENT
Start: 2023-08-22

## 2023-08-22 RX ORDER — CHOLECALCIFEROL (VITAMIN D3) 25 MCG
2000 TABLET ORAL DAILY
Qty: 180 TABLET | Refills: 3 | Status: SHIPPED | OUTPATIENT
Start: 2023-08-22

## 2023-08-22 RX ORDER — LOSARTAN POTASSIUM 50 MG/1
50 TABLET ORAL DAILY
Qty: 90 TABLET | Refills: 1 | Status: SHIPPED | OUTPATIENT
Start: 2023-08-22 | End: 2024-03-06 | Stop reason: SDUPTHER

## 2023-08-22 RX ORDER — ROSUVASTATIN CALCIUM 5 MG/1
5 TABLET, COATED ORAL DAILY
Qty: 90 TABLET | Refills: 3 | Status: SHIPPED | OUTPATIENT
Start: 2023-08-22 | End: 2024-08-21

## 2023-08-22 RX ORDER — METOPROLOL SUCCINATE 100 MG/1
100 TABLET, EXTENDED RELEASE ORAL DAILY
Qty: 90 TABLET | Refills: 3 | Status: SHIPPED | OUTPATIENT
Start: 2023-08-22

## 2023-08-22 NOTE — PROGRESS NOTES
Patient ID: Misa Barajas is a 77 y.o. female.    Chief Complaint: Annual Exam    HPI Misa is a 77 y.o. female with  HTN, HLD, aortic atherosclerosis, atrial fibrillation, chronic kidney disease stage 3, chronic diastolic CHF, osteoporosis and asthma/ COPD who presents for routine follow up of medical conditions. No new acute complaints.   Reviewed lab results from yesterday.   She plans to follow up with cardiology soon.  She declines to continue mammograms, DXA scans, and low dose CT scans for lung cancer screening.     Health Maintenance Topics with due status: Not Due       Topic Last Completion Date    TETANUS VACCINE 08/08/2016    Influenza Vaccine 10/20/2022    Lipid Panel 08/21/2023        Review of Systems   All other systems reviewed and are negative.     Objective:     Vitals:    08/22/23 1314   BP: 122/60   Pulse: 66        Physical Exam  Vitals reviewed.   Constitutional:       General: She is not in acute distress.     Appearance: Normal appearance. She is well-developed. She is not ill-appearing, toxic-appearing or diaphoretic.   HENT:      Head: Normocephalic and atraumatic.      Right Ear: External ear normal.      Left Ear: External ear normal.      Nose: Nose normal.   Eyes:      General: No scleral icterus.        Right eye: No discharge.         Left eye: No discharge.      Extraocular Movements: Extraocular movements intact.      Conjunctiva/sclera: Conjunctivae normal.   Cardiovascular:      Rate and Rhythm: Normal rate and regular rhythm.      Heart sounds: Normal heart sounds. No murmur heard.     No friction rub. No gallop.   Pulmonary:      Effort: Pulmonary effort is normal. No respiratory distress.      Breath sounds: Normal breath sounds. No stridor. No wheezing, rhonchi or rales.   Musculoskeletal:      Right lower leg: No edema.      Left lower leg: No edema.   Skin:     General: Skin is warm and dry.   Neurological:      General: No focal deficit present.      Mental Status:  She is alert and oriented to person, place, and time. Mental status is at baseline.   Psychiatric:         Mood and Affect: Mood normal.         Behavior: Behavior normal.         Thought Content: Thought content normal.         Judgment: Judgment normal.         Assessment:       1. Chronic diastolic heart failure Well controlled   2. Hyperlipidemia, unspecified hyperlipidemia type Well controlled   3. Asthma-COPD overlap syndrome Well controlled   4. Essential hypertension Well controlled   5. Vitamin D insufficiency Well controlled   6. Aortic atherosclerosis Chronic   7. Stage 3b chronic kidney disease Chronic   8. Chronic anticoagulation Chronic   9. Paroxysmal atrial fibrillation Well controlled   10. Osteoporosis, unspecified osteoporosis type, unspecified pathological fracture presence Chronic       Plan:         Chronic diastolic heart failure  Comments:  Cont current medications and continue to follow with cardiology   Orders:  -     losartan (COZAAR) 50 MG tablet; Take 1 tablet (50 mg total) by mouth once daily.  Dispense: 90 tablet; Refill: 1  -     furosemide (LASIX) 40 MG tablet; Take 1 tablet (40 mg total) by mouth daily as needed (leg swelling).  Dispense: 90 tablet; Refill: 1    Hyperlipidemia, unspecified hyperlipidemia type  Comments:  Continue current medication  Orders:  -     rosuvastatin (CRESTOR) 5 MG tablet; Take 1 tablet (5 mg total) by mouth once daily.  Dispense: 90 tablet; Refill: 3    Asthma-COPD overlap syndrome  Comments:  Continue current medication. F/u with pulmonology  Orders:  -     budesonide-glycopyr-formoterol (BREZTRI AEROSPHERE) 160-9-4.8 mcg/actuation HFAA; Inhale 2 puffs into the lungs 2 (two) times daily.  Dispense: 32.1 g; Refill: 3    Essential hypertension  Comments:  Continue current medication  Orders:  -     metoprolol succinate (TOPROL-XL) 100 MG 24 hr tablet; Take 1 tablet (100 mg total) by mouth once daily.  Dispense: 90 tablet; Refill: 3    Vitamin D  insufficiency  Comments:  Continue current medication  Orders:  -     vitamin D (VITAMIN D3) 1000 units Tab; Take 2 tablets (2,000 Units total) by mouth once daily.  Dispense: 180 tablet; Refill: 3    Aortic atherosclerosis  Comments:  Continue aspirin and statin  Orders:  -     Lipid Panel; Future; Expected date: 01/01/2024    Stage 3b chronic kidney disease  Comments:  Avoid NSAID medications. Continue to monitor   Orders:  -     CBC Auto Differential; Future; Expected date: 01/01/2024  -     Comprehensive Metabolic Panel; Future; Expected date: 01/01/2024    Chronic anticoagulation    Paroxysmal atrial fibrillation  Comments:  Continue current medication. Continue to follow with cardiology   Orders:  -     TSH; Future; Expected date: 01/01/2024    Osteoporosis, unspecified osteoporosis type, unspecified pathological fracture presence  -     Vitamin D; Future; Expected date: 01/01/2024        RTC 6 months     Warning signs discussed, patient to call with any further issues or worsening of symptoms.       Parts of the above note were dictated using a voice dictation software. Please excuse any grammatical or typographical errors.

## 2023-08-23 NOTE — PLAN OF CARE
[facility] [place] - Accepted    (O) [facility] - Accepted, patient has been taught and medication will be delivered to the patient's home.      05/06/22 1257   Post-Acute Status   Post-Acute Authorization Home Health;[facility] In[facility]   Home Health Status Set-up Complete/Auth obtained   [facility] Status Set-up Complete/Auth obtained     Connie Eduardo LMSW   - [facility]  Ext. [phone]     History/Exam/Medical decision making

## 2023-10-31 NOTE — TELEPHONE ENCOUNTER
----- Message from Anaid Reyez sent at 1/6/2020  2:29 PM CST -----  Contact: patient  Patient called to speak with a nurse concerning her condition. And to ask of medications she can take.     She would like a callback at [phone]    Thanks  KB   None

## 2024-02-29 ENCOUNTER — TELEPHONE (OUTPATIENT)
Dept: CARDIOLOGY | Facility: CLINIC | Age: 79
End: 2024-02-29
Payer: MEDICARE

## 2024-02-29 NOTE — TELEPHONE ENCOUNTER
Pt called back w. notice of which appt she could get to. She was scheduled in [place] w. dr Martin and agreed to date/time of appointment(s).

## 2024-02-29 NOTE — TELEPHONE ENCOUNTER
----- Message from Joy Lema RN sent at 2/29/2024  9:12 AM CST -----  Regarding: dizziness  Pt c/o dizziness, nausea for at least one month. Feels like it is related to furosemide (takes 40 qd).  Dizziness happens randomly during the day, not related w. change in position, happens often when working around the house. She does not drive anymore due to does not know when will get dizzy.  She has ongoing SOB including working around the house. She is not sure if worse than normal or same.  BP readings 119/60, 125/59 128/66 , HR 92, 94.  HR in the 60s at prev md visits.  Does not think that she is dehydrated, states drinks all day long.  I wanted to schedule her for a visit but she depends on her daughters for transportation and will call me back after contacting them.    ----- Message -----  From: Stephani Silva RN  Sent: 2/29/2024   8:57 AM CST  To: Joy Lema RN  Subject: FW: medication issue                               ----- Message -----  From: Dnaae Powell  Sent: 2/29/2024   8:44 AM CST  To: Stephani Silva RN  Subject: medication issue                                 Dr. Susan TREVINO 2/10/23    Pls call pt at [phone].  She says that the Lasix is making her very dizzy and nauseated.    Thank you

## 2024-03-01 ENCOUNTER — HOSPITAL ENCOUNTER (OUTPATIENT)
Facility: HOSPITAL | Age: 79
Discharge: HOME | End: 2024-03-03
Attending: EMERGENCY MEDICINE | Admitting: EMERGENCY MEDICINE
Payer: MEDICARE

## 2024-03-01 DIAGNOSIS — I48.91 A-FIB: ICD-10-CM

## 2024-03-01 DIAGNOSIS — R06.00 DYSPNEA: ICD-10-CM

## 2024-03-01 DIAGNOSIS — I50.9 CHF (CONGESTIVE HEART FAILURE): ICD-10-CM

## 2024-03-01 DIAGNOSIS — I50.32 CHRONIC DIASTOLIC HEART FAILURE: ICD-10-CM

## 2024-03-01 DIAGNOSIS — R06.02 SOB (SHORTNESS OF BREATH): ICD-10-CM

## 2024-03-01 DIAGNOSIS — I50.9 ACUTE ON CHRONIC CONGESTIVE HEART FAILURE, UNSPECIFIED HEART FAILURE TYPE: Primary | ICD-10-CM

## 2024-03-01 PROBLEM — I50.33 ACUTE ON CHRONIC DIASTOLIC CONGESTIVE HEART FAILURE: Status: ACTIVE | Noted: 2024-03-01

## 2024-03-01 LAB
ALBUMIN SERPL BCP-MCNC: 3 G/DL (ref 3.5–5.2)
ALLENS TEST: ABNORMAL
ALP SERPL-CCNC: 101 U/L (ref 55–135)
ALT SERPL W/O P-5'-P-CCNC: 19 U/L (ref 10–44)
ANION GAP SERPL CALC-SCNC: 9 MMOL/L (ref 8–16)
AST SERPL-CCNC: 31 U/L (ref 10–40)
BACTERIA #/AREA URNS AUTO: ABNORMAL /HPF
BASOPHILS # BLD AUTO: 0.06 K/UL (ref 0–0.2)
BASOPHILS NFR BLD: 0.6 % (ref 0–1.9)
BILIRUB SERPL-MCNC: 0.9 MG/DL (ref 0.1–1)
BILIRUB UR QL STRIP: NEGATIVE
BNP SERPL-MCNC: 1261 PG/ML (ref 0–99)
BUN SERPL-MCNC: 18 MG/DL (ref 8–23)
CALCIUM SERPL-MCNC: 10.3 MG/DL (ref 8.7–10.5)
CHLORIDE SERPL-SCNC: 108 MMOL/L (ref 95–110)
CLARITY UR REFRACT.AUTO: CLEAR
CO2 SERPL-SCNC: 24 MMOL/L (ref 23–29)
COLOR UR AUTO: COLORLESS
CREAT SERPL-MCNC: 1.2 MG/DL (ref 0.5–1.4)
DIFFERENTIAL METHOD BLD: ABNORMAL
EOSINOPHIL # BLD AUTO: 0.2 K/UL (ref 0–0.5)
EOSINOPHIL NFR BLD: 2 % (ref 0–8)
ERYTHROCYTE [DISTWIDTH] IN BLOOD BY AUTOMATED COUNT: 13.4 % (ref 11.5–14.5)
EST. GFR  (NO RACE VARIABLE): 45.8 ML/MIN/1.73 M^2
ESTIMATED AVG GLUCOSE: 108 MG/DL (ref 68–131)
GLUCOSE SERPL-MCNC: 102 MG/DL (ref 70–110)
GLUCOSE UR QL STRIP: NEGATIVE
HBA1C MFR BLD: 5.4 % (ref 4–5.6)
HCO3 UR-SCNC: 28.7 MMOL/L (ref 24–28)
HCT VFR BLD AUTO: 45.7 % (ref 37–48.5)
HCV AB SERPL QL IA: NORMAL
HGB BLD-MCNC: 15.2 G/DL (ref 12–16)
HGB UR QL STRIP: NEGATIVE
HIV 1+2 AB+HIV1 P24 AG SERPL QL IA: NORMAL
IMM GRANULOCYTES # BLD AUTO: 0.07 K/UL (ref 0–0.04)
IMM GRANULOCYTES NFR BLD AUTO: 0.7 % (ref 0–0.5)
KETONES UR QL STRIP: NEGATIVE
LEUKOCYTE ESTERASE UR QL STRIP: ABNORMAL
LYMPHOCYTES # BLD AUTO: 1.9 K/UL (ref 1–4.8)
LYMPHOCYTES NFR BLD: 18.3 % (ref 18–48)
MAGNESIUM SERPL-MCNC: 1.8 MG/DL (ref 1.6–2.6)
MCH RBC QN AUTO: 30.6 PG (ref 27–31)
MCHC RBC AUTO-ENTMCNC: 33.3 G/DL (ref 32–36)
MCV RBC AUTO: 92 FL (ref 82–98)
MICROSCOPIC COMMENT: ABNORMAL
MONOCYTES # BLD AUTO: 1 K/UL (ref 0.3–1)
MONOCYTES NFR BLD: 9.9 % (ref 4–15)
NEUTROPHILS # BLD AUTO: 7.1 K/UL (ref 1.8–7.7)
NEUTROPHILS NFR BLD: 68.5 % (ref 38–73)
NITRITE UR QL STRIP: NEGATIVE
NRBC BLD-RTO: 0 /100 WBC
OHS QRS DURATION: 126 MS
OHS QRS DURATION: 146 MS
OHS QTC CALCULATION: 462 MS
OHS QTC CALCULATION: 511 MS
PCO2 BLDA: 50.7 MMHG (ref 35–45)
PH SMN: 7.36 [PH] (ref 7.35–7.45)
PH UR STRIP: 7 [PH] (ref 5–8)
PLATELET # BLD AUTO: 218 K/UL (ref 150–450)
PMV BLD AUTO: 9.9 FL (ref 9.2–12.9)
PO2 BLDA: 15 MMHG (ref 40–60)
POC BE: 3 MMOL/L
POC SATURATED O2: 18 % (ref 95–100)
POC TCO2: 30 MMOL/L (ref 24–29)
POTASSIUM SERPL-SCNC: 3.8 MMOL/L (ref 3.5–5.1)
PROT SERPL-MCNC: 6.4 G/DL (ref 6–8.4)
PROT UR QL STRIP: NEGATIVE
RBC # BLD AUTO: 4.96 M/UL (ref 4–5.4)
RBC #/AREA URNS AUTO: 1 /HPF (ref 0–4)
SAMPLE: ABNORMAL
SITE: ABNORMAL
SODIUM SERPL-SCNC: 141 MMOL/L (ref 136–145)
SP GR UR STRIP: 1.01 (ref 1–1.03)
SQUAMOUS #/AREA URNS AUTO: 2 /HPF
TROPONIN I SERPL DL<=0.01 NG/ML-MCNC: 0.02 NG/ML (ref 0–0.03)
URN SPEC COLLECT METH UR: ABNORMAL
WBC # BLD AUTO: 10.35 K/UL (ref 3.9–12.7)
WBC #/AREA URNS AUTO: 12 /HPF (ref 0–5)

## 2024-03-01 PROCEDURE — 93005 ELECTROCARDIOGRAM TRACING: CPT

## 2024-03-01 PROCEDURE — 93010 ELECTROCARDIOGRAM REPORT: CPT | Mod: ,,, | Performed by: INTERNAL MEDICINE

## 2024-03-01 PROCEDURE — 87086 URINE CULTURE/COLONY COUNT: CPT | Performed by: STUDENT IN AN ORGANIZED HEALTH CARE EDUCATION/TRAINING PROGRAM

## 2024-03-01 PROCEDURE — 87389 HIV-1 AG W/HIV-1&-2 AB AG IA: CPT | Performed by: PHYSICIAN ASSISTANT

## 2024-03-01 PROCEDURE — 83735 ASSAY OF MAGNESIUM: CPT | Performed by: EMERGENCY MEDICINE

## 2024-03-01 PROCEDURE — 82803 BLOOD GASES ANY COMBINATION: CPT

## 2024-03-01 PROCEDURE — 80053 COMPREHEN METABOLIC PANEL: CPT | Performed by: EMERGENCY MEDICINE

## 2024-03-01 PROCEDURE — 25000003 PHARM REV CODE 250: Performed by: PHYSICIAN ASSISTANT

## 2024-03-01 PROCEDURE — 99285 EMERGENCY DEPT VISIT HI MDM: CPT | Mod: 25

## 2024-03-01 PROCEDURE — 96374 THER/PROPH/DIAG INJ IV PUSH: CPT | Mod: 59

## 2024-03-01 PROCEDURE — 63600175 PHARM REV CODE 636 W HCPCS

## 2024-03-01 PROCEDURE — 84484 ASSAY OF TROPONIN QUANT: CPT | Performed by: STUDENT IN AN ORGANIZED HEALTH CARE EDUCATION/TRAINING PROGRAM

## 2024-03-01 PROCEDURE — 81001 URINALYSIS AUTO W/SCOPE: CPT | Performed by: STUDENT IN AN ORGANIZED HEALTH CARE EDUCATION/TRAINING PROGRAM

## 2024-03-01 PROCEDURE — G0378 HOSPITAL OBSERVATION PER HR: HCPCS

## 2024-03-01 PROCEDURE — 87186 SC STD MICRODIL/AGAR DIL: CPT | Performed by: STUDENT IN AN ORGANIZED HEALTH CARE EDUCATION/TRAINING PROGRAM

## 2024-03-01 PROCEDURE — 83036 HEMOGLOBIN GLYCOSYLATED A1C: CPT | Performed by: PHYSICIAN ASSISTANT

## 2024-03-01 PROCEDURE — 83880 ASSAY OF NATRIURETIC PEPTIDE: CPT | Performed by: STUDENT IN AN ORGANIZED HEALTH CARE EDUCATION/TRAINING PROGRAM

## 2024-03-01 PROCEDURE — 86803 HEPATITIS C AB TEST: CPT | Performed by: PHYSICIAN ASSISTANT

## 2024-03-01 PROCEDURE — 85025 COMPLETE CBC W/AUTO DIFF WBC: CPT | Performed by: STUDENT IN AN ORGANIZED HEALTH CARE EDUCATION/TRAINING PROGRAM

## 2024-03-01 PROCEDURE — 87077 CULTURE AEROBIC IDENTIFY: CPT | Performed by: STUDENT IN AN ORGANIZED HEALTH CARE EDUCATION/TRAINING PROGRAM

## 2024-03-01 PROCEDURE — 87088 URINE BACTERIA CULTURE: CPT | Performed by: STUDENT IN AN ORGANIZED HEALTH CARE EDUCATION/TRAINING PROGRAM

## 2024-03-01 PROCEDURE — 99900035 HC TECH TIME PER 15 MIN (STAT)

## 2024-03-01 PROCEDURE — 25000003 PHARM REV CODE 250: Performed by: NURSE PRACTITIONER

## 2024-03-01 RX ORDER — METOPROLOL SUCCINATE 100 MG/1
100 TABLET, EXTENDED RELEASE ORAL DAILY
Status: DISCONTINUED | OUTPATIENT
Start: 2024-03-02 | End: 2024-03-03 | Stop reason: HOSPADM

## 2024-03-01 RX ORDER — ONDANSETRON HYDROCHLORIDE 2 MG/ML
4 INJECTION, SOLUTION INTRAVENOUS EVERY 8 HOURS PRN
Status: DISCONTINUED | OUTPATIENT
Start: 2024-03-01 | End: 2024-03-03 | Stop reason: HOSPADM

## 2024-03-01 RX ORDER — LOSARTAN POTASSIUM 50 MG/1
50 TABLET ORAL DAILY
Status: DISCONTINUED | OUTPATIENT
Start: 2024-03-02 | End: 2024-03-03 | Stop reason: HOSPADM

## 2024-03-01 RX ORDER — HYDRALAZINE HYDROCHLORIDE 20 MG/ML
10 INJECTION INTRAMUSCULAR; INTRAVENOUS EVERY 8 HOURS PRN
Status: DISCONTINUED | OUTPATIENT
Start: 2024-03-01 | End: 2024-03-03 | Stop reason: HOSPADM

## 2024-03-01 RX ORDER — FUROSEMIDE 10 MG/ML
80 INJECTION INTRAMUSCULAR; INTRAVENOUS
Status: COMPLETED | OUTPATIENT
Start: 2024-03-01 | End: 2024-03-01

## 2024-03-01 RX ORDER — SODIUM CHLORIDE 0.9 % (FLUSH) 0.9 %
10 SYRINGE (ML) INJECTION
Status: DISCONTINUED | OUTPATIENT
Start: 2024-03-01 | End: 2024-03-03 | Stop reason: HOSPADM

## 2024-03-01 RX ORDER — FUROSEMIDE 10 MG/ML
40 INJECTION INTRAMUSCULAR; INTRAVENOUS 2 TIMES DAILY
Status: DISCONTINUED | OUTPATIENT
Start: 2024-03-02 | End: 2024-03-02

## 2024-03-01 RX ORDER — FLUTICASONE FUROATE AND VILANTEROL 100; 25 UG/1; UG/1
1 POWDER RESPIRATORY (INHALATION) DAILY
Status: DISCONTINUED | OUTPATIENT
Start: 2024-03-01 | End: 2024-03-03 | Stop reason: HOSPADM

## 2024-03-01 RX ORDER — ASPIRIN 81 MG/1
81 TABLET ORAL DAILY
Status: DISCONTINUED | OUTPATIENT
Start: 2024-03-02 | End: 2024-03-03 | Stop reason: HOSPADM

## 2024-03-01 RX ORDER — ATORVASTATIN CALCIUM 20 MG/1
20 TABLET, FILM COATED ORAL DAILY
Status: DISCONTINUED | OUTPATIENT
Start: 2024-03-02 | End: 2024-03-03 | Stop reason: HOSPADM

## 2024-03-01 RX ORDER — CHOLECALCIFEROL (VITAMIN D3) 25 MCG
2000 TABLET ORAL DAILY
Status: DISCONTINUED | OUTPATIENT
Start: 2024-03-01 | End: 2024-03-03 | Stop reason: HOSPADM

## 2024-03-01 RX ORDER — POLYETHYLENE GLYCOL 3350 17 G/17G
17 POWDER, FOR SOLUTION ORAL DAILY PRN
Status: DISCONTINUED | OUTPATIENT
Start: 2024-03-01 | End: 2024-03-03 | Stop reason: HOSPADM

## 2024-03-01 RX ORDER — ONDANSETRON 4 MG/1
4 TABLET, ORALLY DISINTEGRATING ORAL EVERY 8 HOURS PRN
Status: DISCONTINUED | OUTPATIENT
Start: 2024-03-01 | End: 2024-03-03 | Stop reason: HOSPADM

## 2024-03-01 RX ORDER — ACETAMINOPHEN 325 MG/1
650 TABLET ORAL EVERY 6 HOURS PRN
Status: DISCONTINUED | OUTPATIENT
Start: 2024-03-01 | End: 2024-03-03 | Stop reason: HOSPADM

## 2024-03-01 RX ADMIN — APIXABAN 5 MG: 5 TABLET, FILM COATED ORAL at 08:03

## 2024-03-01 RX ADMIN — ACETAMINOPHEN 650 MG: 325 TABLET ORAL at 09:03

## 2024-03-01 RX ADMIN — FUROSEMIDE 80 MG: 10 INJECTION, SOLUTION INTRAVENOUS at 11:03

## 2024-03-01 RX ADMIN — CHOLECALCIFEROL TAB 25 MCG (1000 UNIT) 2000 UNITS: 25 TAB at 04:03

## 2024-03-01 RX ADMIN — HYDRALAZINE HYDROCHLORIDE 10 MG: 20 INJECTION, SOLUTION INTRAMUSCULAR; INTRAVENOUS at 06:03

## 2024-03-01 NOTE — ASSESSMENT & PLAN NOTE
Creatine stable for now. BMP reviewed- noted Estimated Creatinine Clearance: 37.2 mL/min (based on SCr of 1.2 mg/dL). according to latest data. Based on current GFR, CKD stage is stage 3 - GFR 30-59.  Monitor UOP and serial BMP and adjust therapy as needed. Renally dose meds. Avoid nephrotoxic medications and procedures.  Monitor closely while diuresing

## 2024-03-01 NOTE — ASSESSMENT & PLAN NOTE
Patient with Hypercapnic Respiratory failure which is Chronic.  she is on home oxygen at 2 LPM. Supplemental oxygen was provided and noted-  Signs/symptoms of respiratory failure include- respiratory distress. Contributing diagnoses includes - CHF, COPD, Obesity Hypoventilation, and Pleural effusion Labs and images were reviewed. Patient Has recent ABG, which has been reviewed. Will treat underlying causes and adjust management of respiratory failure: see CHF

## 2024-03-01 NOTE — HPI
"Ms. Misa Barajas is a 78-year-old female with a past medical history of COPD and HTN/ HLD complicated by HFpEF (60% on 5/4/22) on 2 L home O2, and pAF (eliquis) now presenting with shortness of breath and leg swelling. Patient reports that for the past 6 weeks she has experienced worsening shortness of breath with ambulation which acutely worsened this morning. Patient denies any recent fevers, viral type illnesses, or chest pain. Endorses dry cough. Patient reports taking albuterol inhaler without improvement. She also endorses orthopnea and b/l lower extremity swelling. Reports that she stopped taking her Lasix and only took half a pill "as needed" within the last 2 weeks because it was making her lightheaded. She is noncompliant w/ low sodium diet. Otherwise, Pt reports taking her home medications as prescribed and had taken them prior to arrival to ED. Further denies any weakness, fatigue, abdominal pain, n/v/d, dysuria or change in BM.    In ED, Pt hypertensive 156/109, sats 95% on 2L NC, . No leukocytosis. Electrolytes wnl. BNP 1,261, trop 0.02. PH 7.36, PCO2 50.7, PO2 15, HCO3 28.7. CXR w/ mild cardiomegaly, blunted costophrenic angles b/l, and mild edema. Given lasix 80 mg IV x1.   "

## 2024-03-01 NOTE — ASSESSMENT & PLAN NOTE
Patient is identified as having Diastolic (HFpEF) heart failure that is Acute on chronic. CHF is currently uncontrolled due to Continued edema of extremities and JVD, >3 pillow orthopnea, and Pulmonary edema/pleural effusion on CXR. Latest ECHO performed and demonstrates- Results for orders placed during the hospital encounter of 04/28/22    Echo    Interpretation Summary  · The left ventricle is normal in size with normal systolic function.  · The estimated ejection fraction is 60%.  · There are segmental left ventricular wall motion abnormalities.  · There is abnormal septal wall motion.  · Indeterminate left ventricular diastolic function.  · Normal right ventricular size with normal right ventricular systolic function.  · Mild mitral regurgitation.  · Mild tricuspid regurgitation.  · Intermediate central venous pressure (8 mmHg).  · The estimated PA systolic pressure is 31 mmHg.  . Continue Beta Blocker, ACE/ARB, and Furosemide and monitor clinical status closely. Monitor on telemetry. Patient is on CHF pathway.  Monitor strict Is&Os and daily weights.  Place on fluid restriction of 1.5 L. Cardiology has not been consulted. Continue to stress to patient importance of self efficacy and  on diet for CHF. Last BNP reviewed- and noted below   Recent Labs   Lab 03/01/24  1148   BNP 1,261*     -lasix 40 mg IV BID  -1.5-2L UOP daily goal  -echo pending  -tele  -K>4, Mg>2

## 2024-03-01 NOTE — ASSESSMENT & PLAN NOTE
Patient with Paroxysmal (<7 days) atrial fibrillation which is controlled currently with Beta Blocker. Patient is currently in sinus rhythm.[id number] Score: 3. Anticoagulation indicated. Anticoagulation done with eliquis .    - continue eliquis and mtp succ.

## 2024-03-01 NOTE — ED TRIAGE NOTES
Misa Barajas, a 78 y.o. female presents to the ED w/ complaint of worsening SOB over the last couple of week. Has not been taking lasix as prescribed.    Triage note:  Chief Complaint   Patient presents with    Shortness of Breath     X a couple weeks, worsening today, hx COPD     Review of patient's allergies indicates:   Allergen Reactions    Adhesive Rash     Pt states she removed a LATEX bandaid and the skin beneath was swollen and red. No other latex causes a reaction.     Past Medical History:   Diagnosis Date    Acute hypoxemic respiratory failure 6/26/2021    Acute superficial venous thrombosis of lower extremity, bilateral 1/25/2022    Aortic atherosclerosis     Arthritis     knee joint pain    Asthma-COPD overlap syndrome 8/22/2022    Breast cancer 2002    left breast & lymph nodes-s/p sx with chemo    Bronchitis     with flu-2/2014    Cataracts, bilateral     Chronic anticoagulation 5/4/2022    Chronic diastolic heart failure 12/24/2019    Chronic kidney disease, stage 3     History of chemotherapy     last treatment 12/2002 (had 8 treatments)    Hyperlipidemia 11/20/2016    Hypertension     Lymphedema of both lower extremities 1/25/2022    Nephrolithiasis 6/2/2014    Osteoporosis     Paroxysmal atrial fibrillation 6/7/2021    Renal calculi     hematuria    Renal osteodystrophy 1/14/2016    Venous stasis dermatitis of both lower extremities 1/25/2022    Vitamin D insufficiency

## 2024-03-01 NOTE — ED PROVIDER NOTES
"Encounter Date: 3/1/2024       History     Chief Complaint   Patient presents with    Shortness of Breath     X a couple weeks, worsening today, hx COPD     78-year-old female with a past medical history of COPD and HTN/ HLD complicated by HFpEF (60% on 5/4/22) on 2 L home O2, and pAF (eliquis) now presenting with chief complaint of shortness of breath.    Patient reports that for the past 6 weeks she has experienced worsening shortness of breath with ambulation which acutely worsened this morning. Patient denies any recent fevers, viral type illnesses, coughing, or chest pain.  Patient reports taking albuterol inhaler without improvement but endorses that she stopped taking her Lasix and only took half a pill "as needed" within the last few weeks.    The history is provided by the patient.     Review of patient's allergies indicates:   Allergen Reactions    Adhesive Rash     Pt states she removed a LATEX bandaid and the skin beneath was swollen and red. No other latex causes a reaction.     Past Medical History:   Diagnosis Date    Acute hypoxemic respiratory failure 6/26/2021    Acute superficial venous thrombosis of lower extremity, bilateral 1/25/2022    Aortic atherosclerosis     Arthritis     knee joint pain    Asthma-COPD overlap syndrome 8/22/2022    Breast cancer 2002    left breast & lymph nodes-s/p sx with chemo    Bronchitis     with flu-2/2014    Cataracts, bilateral     Chronic anticoagulation 5/4/2022    Chronic diastolic heart failure 12/24/2019    Chronic kidney disease, stage 3     History of chemotherapy     last treatment 12/2002 (had 8 treatments)    Hyperlipidemia 11/20/2016    Hypertension     Lymphedema of both lower extremities 1/25/2022    Nephrolithiasis 6/2/2014    Osteoporosis     Paroxysmal atrial fibrillation 6/7/2021    Renal calculi     hematuria    Renal osteodystrophy 1/14/2016    Venous stasis dermatitis of both lower extremities 1/25/2022    Vitamin D insufficiency      Past " Surgical History:   Procedure Laterality Date    BREAST BIOPSY Left 2002    core bx, +    BREAST BIOPSY Right 2018    core    BREAST BIOPSY Right 2019    BREAST LUMPECTOMY Left 2002    CYSTOSCOPY  4/28/2022    Procedure: CYSTOSCOPY;  Surgeon: Vik Ware MD;  Location: [facility] OR 1ST FLR;  Service: Urology;;    CYSTOSCOPY  5/30/2022    Procedure: CYSTOSCOPY;  Surgeon: Bonnie Knutson MD;  Location: [facility] OR 1ST FLR;  Service: Urology;;    LASER LITHOTRIPSY  5/30/2022    Procedure: LITHOTRIPSY, USING LASER;  Surgeon: Bonnie Knutson MD;  Location: [facility] OR 1ST FLR;  Service: Urology;;    LITHOTRIPSY      MASTECTOMY Left 06/2002    left-& lymph node dissection    REPLACEMENT OF STENT Right 5/30/2022    Procedure: REPLACEMENT, STENT;  Surgeon: Bonnie Kntuson MD;  Location: [facility] OR 1ST FLR;  Service: Urology;  Laterality: Right;    RETROGRADE PYELOGRAPHY Right 4/28/2022    Procedure: PYELOGRAM, RETROGRADE;  Surgeon: Vik Ware MD;  [facility];  Service: Urology;  Laterality: Right;    ROBOT-ASSISTED LAPAROSCOPIC PARTIAL NEPHRECTOMY USING DA JESÚS XI Right 3/11/2021    Procedure: XI ROBOTIC NEPHRECTOMY, PARTIAL;  Surgeon: Taz Ortega MD;  Location: [facility] OR 2ND FLR;  Service: Urology;  Laterality: Right;  4hr/ gen with regional  [place] confirmation:  [id number] for 11:15am case NC    URETEROSCOPIC REMOVAL OF URETERIC CALCULUS Right 5/30/2022    Procedure: REMOVAL, CALCULUS, URETER, URETEROSCOPIC;  Surgeon: Bonnie Knutson MD;  [facility];  Service: Urology;  Laterality: Right;    ureteroscopy Bilateral     6.14    URETEROSCOPY Right 5/30/2022    Procedure: URETEROSCOPY;  Surgeon: Bonnie Knutson MD;  Location: [facility] OR [address] [place] [address];  Service: Urology;  Laterality: Right;     Family History   Problem Relation Age of Onset    Bone cancer Mother     Breast cancer Mother     Arthritis Mother         Breast Cancer    Breast cancer Sister     Cancer  Father         Asbestos cancer    No Known Problems Brother     Fibroids Daughter     Crohn's disease Daughter     No Known Problems Daughter     Arthritis Sister         Breast Cancer    Anesthesia problems Neg Hx     Glaucoma Neg Hx      Social History     Tobacco Use    Smoking status: Former     Current packs/day: 0.00     Average packs/day: 1 pack/day for 50.0 years (50.0 ttl pk-yrs)     Types: Cigarettes     Start date:      Quit date: 2009     Years since quittin.7    Smokeless tobacco: Never   Substance Use Topics    Alcohol use: No    Drug use: No     Review of Systems  See HPI    Physical Exam     Initial Vitals [24 0938]   BP Pulse Resp Temp SpO2   (!) 164/95 110 20 97.6 °F (36.4 °C) (!) 91 %      MAP       --         Physical Exam    Nursing note and vitals reviewed.      Gen: AxOx3, well nourished, appears stated age, no pallor, no jaundice, appears well hydrated  Eye: EOMI, no scleral icterus, no periorbital edema or ecchymosis  Head: Normocephalic, atraumatic, no lesions, scalp appears normal  ENT: Neck supple, no stridor, no masses, no drooling or voice changes  CVS: All distal pulses intact with irregularly irregular rhythm with rate 100, mild JVD, normal S1/S2, no murmur  Pulm: Normal breath sounds, no wheezes, rales or rhonchi, no increased work of breathing  Abd:  Nondistended, soft, nontender, no organomegaly, no CVAT  Ext:  3+ pitting bilateral lower limb edema, no lesions, rashes, or deformity  Neuro: GCS15, moving all extremities, gait intact, face grossly symmetric  Psych: normal affect, cooperative, well groomed, makes good eye contact      ED Course   Procedures  Labs Reviewed   CBC W/ AUTO DIFFERENTIAL - Abnormal; Notable for the following components:       Result Value    Immature Granulocytes 0.7 (*)     Immature Grans (Abs) 0.07 (*)     All other components within normal limits   B-TYPE NATRIURETIC PEPTIDE - Abnormal; Notable for the following components:    BNP  1,261 (*)     All other components within normal limits   COMPREHENSIVE METABOLIC PANEL - Abnormal; Notable for the following components:    Albumin 3.0 (*)     eGFR 45.8 (*)     All other components within normal limits   ISTAT PROCEDURE - Abnormal; Notable for the following components:    POC PCO2 50.7 (*)     POC PO2 15 (*)     POC HCO3 28.7 (*)     POC BE 3 (*)     POC TCO2 30 (*)     All other components within normal limits   HIV 1 / 2 ANTIBODY    Narrative:     Release to patient->Immediate   HEPATITIS C ANTIBODY    Narrative:     Release to patient->Immediate   TROPONIN I   MAGNESIUM   URINALYSIS, REFLEX TO URINE CULTURE   HEMOGLOBIN A1C     EKG Readings: (Independently Interpreted)   Initial Reading: No STEMI.   Sinus rhythm with a rate 78 with frequent PACs noted.  Right bundle-branch block.  No ST elevation or depression noted.     ECG Results              EKG 12-lead (Final result)        Collection Time Result Time QRS Duration OHS QTC Calculation    03/01/24 09:48:14 03/01/24 10:17:08 146 511                     Final result by Interface, Lab In [facility] (03/01/24 10:17:15)                   Narrative:    Test Reason : R06.02,    Vent. Rate : 092 BPM     Atrial Rate : 000 BPM     P-R Int : 000 ms          QRS Dur : 146 ms      QT Int : 414 ms       P-R-T Axes : 000 -72 068 degrees     QTc Int : 511 ms    NSR with PACs and short runs  Right bundle branch block  Left anterior fascicular block   Bifascicular block   Minimal voltage criteria for LVH, may be normal variant ( R in aVL )  Abnormal ECG  When compared with ECG of 13-MAY-2022 10:10,  Significant change has occurred  QT has lengthened  Confirmed by Nestor MCCLENDON MD ([id number]) on 3/1/2024 10:17:06 AM    Referred By:             Confirmed By:Nestor MCCLENDON MD                                  Imaging Results              X-Ray Chest AP Portable (Final result)  Result time 03/01/24 11:36:22      Final result by Lobito Conte MD (03/01/24 11:36:22)                    Impression:      See above      Electronically signed by: Lobito Conte MD  Date:    03/01/2024  Time:    11:36               Narrative:    EXAMINATION:  XR CHEST AP PORTABLE    CLINICAL HISTORY:  dyspnea;    TECHNIQUE:  Single frontal view of the chest was performed.    COMPARISON:  N 08/15/2022 one    FINDINGS:  Mild cardiomegaly.  Accentuation of the interstitial markings and/or mild edema.  Blunted costophrenic angles bilaterally suggesting small amount of pleural effusion on both sides slightly more marked on the left.  The upper lung fields are clear.                                       Medications   apixaban tablet 5 mg (has no administration in time range)   aspirin EC tablet 81 mg (has no administration in time range)   fluticasone furoate-vilanteroL 100-25 mcg/dose diskus inhaler 1 puff (has no administration in time range)   losartan tablet 50 mg (has no administration in time range)   metoprolol succinate (TOPROL-XL) 24 hr tablet 100 mg (has no administration in time range)   atorvastatin tablet 20 mg (has no administration in time range)   vitamin D 1000 units tablet 2,000 Units (has no administration in time range)   sodium chloride 0.9% flush 10 mL (has no administration in time range)   furosemide injection 40 mg (has no administration in time range)   ondansetron disintegrating tablet 4 mg (has no administration in time range)   ondansetron injection 4 mg (has no administration in time range)   polyethylene glycol packet 17 g (has no administration in time range)   furosemide injection 80 mg (80 mg Intravenous Given 3/1/24 5537)     Medical Decision Making  Initial assessment  78-year-old female presenting with shortness of breath. Patient is able to vocalise, breathing spontaneously, hemodynamically stable, oriented, moving all 4 limbs spontaneously.  Examination consistent with patient appearing comfortable examining bed with saturation 91% (which is baseline).  Bilateral lower limb pitting  edema with mild JVD.      Differential diagnosis  CHF exacerbation and considered pulmonary hypertension  Considered but lower suspicion for COPD exacerbation given absence of wheeze  Considered viral respiratory infection but lower suspicion given absence of viral like symptoms  Considered but doubt pneumonia given physical examination      ED management  Patient is well-appearing and saturations were baseline. Decided to do workup for fluid overload.      Amount and/or Complexity of Data Reviewed  Labs: ordered. Decision-making details documented in ED Course.  Radiology:  Decision-making details documented in ED Course.    Risk  Prescription drug management.               ED Course as of 03/01/24 1457   Fri Mar 01, 2024   1051 SpO2(!): 91 % [PM]   1052 BP(!): 164/95 [PM]   1214 X-Ray Chest AP Portable  As per my independent interpretation patient is concerning for bilateral pleural effusions (L> R) with increased interstitial markings consistent with pulmonary edema [PM]   1327 CBC auto differential(!)  Grossly benign [PM]   1327 BNP(!): 1,261 [PM]   1328 POC PCO2(!): 50.7 [PM]   1328 POC HCO3(!): 28.7 [PM]   1328 ISTAT PROCEDURE(!!)  Consistent with hypercarbia without compensation [PM]   1345 Sodium: 141 [PM]   1345 Potassium: 3.8 [PM]      ED Course User Index  [PM] Jayashree Harding MD          Workup consistent with reassuring CBC and CMP VBG shows mild hypercarbia with out compensation however patient is saturating well and I do not believe patient is at risk of worsening hypercarbia and does not need BiPAP at present.  BNP consistent with elevation (1260).  Chest x-ray consistent with fluid overload and small pleural effusion.  Patient given 80 mg IV Lasix.  EKG consistent with numerous PACs however no ischemia noted.  Patient admitted to Hospital Medicine for further diuresis.                   Clinical Impression:  Final diagnoses:  [R06.02] SOB (shortness of breath)  [R06.00] Dyspnea  [I50.9] Acute on  chronic congestive heart failure, unspecified heart failure type (Primary)          ED Disposition Condition    Observation                 Jayashree Harding MD  Resident  03/01/24 2180

## 2024-03-01 NOTE — H&P
"[facility] - Emergency Dept  Hospital Medicine  History & Physical    Patient Name: Misa Barajas  MRN: [id number]  Patient Class: OP- Observation  Admission Date: 3/1/2024  Attending Physician: Kaitlynn Clay MD   Primary Care Provider: Celia Carlisle MD         Patient information was obtained from patient, past medical records, and ER records.     Subjective:     Principal Problem:Acute on chronic diastolic congestive heart failure    Chief Complaint:   Chief Complaint   Patient presents with    Shortness of Breath     X a couple weeks, worsening today, hx COPD        HPI: Ms. Misa Barajas is a 78-year-old female with a past medical history of COPD and HTN/ HLD complicated by HFpEF (60% on 5/4/22) on 2 L home O2, and pAF (eliquis) now presenting with shortness of breath and leg swelling. Patient reports that for the past 6 weeks she has experienced worsening shortness of breath with ambulation which acutely worsened this morning. Patient denies any recent fevers, viral type illnesses, or chest pain. Endorses dry cough. Patient reports taking albuterol inhaler without improvement. She also endorses orthopnea and b/l lower extremity swelling. Reports that she stopped taking her Lasix and only took half a pill "as needed" within the last 2 weeks because it was making her lightheaded. She is noncompliant w/ low sodium diet. Otherwise, Pt reports taking her home medications as prescribed and had taken them prior to arrival to ED. Further denies any weakness, fatigue, abdominal pain, n/v/d, dysuria or change in BM.    In ED, Pt hypertensive 156/109, sats 95% on 2L NC, . No leukocytosis. Electrolytes wnl. BNP 1,261, trop 0.02. PH 7.36, PCO2 50.7, PO2 15, HCO3 28.7. CXR w/ mild cardiomegaly, blunted costophrenic angles b/l, and mild edema. Given lasix 80 mg IV x1.     Past Medical History:   Diagnosis Date    Acute hypoxemic respiratory failure 6/26/2021    Acute superficial venous thrombosis of lower " extremity, bilateral 1/25/2022    Aortic atherosclerosis     Arthritis     knee joint pain    Asthma-COPD overlap syndrome 8/22/2022    Breast cancer 2002    left breast & lymph nodes-s/p sx with chemo    Bronchitis     with flu-2/2014    Cataracts, bilateral     Chronic anticoagulation 5/4/2022    Chronic diastolic heart failure 12/24/2019    Chronic kidney disease, stage 3     History of chemotherapy     last treatment 12/2002 (had 8 treatments)    Hyperlipidemia 11/20/2016    Hypertension     Lymphedema of both lower extremities 1/25/2022    Nephrolithiasis 6/2/2014    Osteoporosis     Paroxysmal atrial fibrillation 6/7/2021    Renal calculi     hematuria    Renal osteodystrophy 1/14/2016    Venous stasis dermatitis of both lower extremities 1/25/2022    Vitamin D insufficiency        Past Surgical History:   Procedure Laterality Date    BREAST BIOPSY Left 2002    core bx, +    BREAST BIOPSY Right 2018    core    BREAST BIOPSY Right 2019    BREAST LUMPECTOMY Left 2002    CYSTOSCOPY  4/28/2022    Procedure: CYSTOSCOPY;  Surgeon: Vik Ware MD;  Location: [facility] OR [address] [place] [address];  Service: Urology;;    CYSTOSCOPY  5/30/2022    Procedure: CYSTOSCOPY;  Surgeon: Bonnie Knutson MD;  Location: [facility] OR [address] [place] [address];  Service: Urology;;    LASER LITHOTRIPSY  5/30/2022    Procedure: LITHOTRIPSY, USING LASER;  Surgeon: Bonnie Knutson MD;  [facility];  Service: Urology;;    LITHOTRIPSY      MASTECTOMY Left 06/2002    left-& lymph node dissection    REPLACEMENT OF STENT Right 5/30/2022    Procedure: REPLACEMENT, STENT;  Surgeon: Bonnie Knutson MD;  [facility];  Service: Urology;  Laterality: Right;    RETROGRADE PYELOGRAPHY Right 4/28/2022    Procedure: PYELOGRAM, RETROGRADE;  Surgeon: Vik Ware MD;  Location: [facility] OR [address] [place] [address];  Service: Urology;  Laterality: Right;    ROBOT-ASSISTED LAPAROSCOPIC PARTIAL NEPHRECTOMY USING DA JESÚS XI Right 3/11/2021    Procedure: XI  ROBOTIC NEPHRECTOMY, PARTIAL;  Surgeon: Taz Ortega MD;  Location: [facility] OR 2ND FLR;  Service: Urology;  Laterality: Right;  4hr/ gen with regional  [facility] confirmation:  [id number] for 11:15am case NC    URETEROSCOPIC REMOVAL OF URETERIC CALCULUS Right 5/30/2022    Procedure: REMOVAL, CALCULUS, URETER, URETEROSCOPIC;  Surgeon: Bonnie Knutson MD;  Location: [facility] OR 1ST FLR;  Service: Urology;  Laterality: Right;    ureteroscopy Bilateral     6.14    URETEROSCOPY Right 5/30/2022    Procedure: URETEROSCOPY;  Surgeon: Bonnie Knutson MD;  Location: [facility] OR 1ST FLR;  Service: Urology;  Laterality: Right;       Review of patient's allergies indicates:   Allergen Reactions    Adhesive Rash     Pt states she removed a LATEX bandaid and the skin beneath was swollen and red. No other latex causes a reaction.       No current facility-administered medications on file prior to encounter.     Current Outpatient Medications on File Prior to Encounter   Medication Sig    acetaminophen (TYLENOL) 500 MG tablet Take 2 tablets (1,000 mg total) by mouth every 8 (eight) hours as needed for Pain.    apixaban (ELIQUIS) 5 mg Tab Take 1 tablet (5 mg total) by mouth 2 (two) times a day.    aspirin (ECOTRIN) 81 MG EC tablet Take 81 mg by mouth once daily.    biotin 1 mg tablet Take 1,000 mcg by mouth once daily.    budesonide-glycopyr-formoterol (BREZTRI AEROSPHERE) 160-9-4.8 mcg/actuation HFAA Inhale 2 puffs into the lungs 2 (two) times daily.    fluticasone propionate (FLONASE) 50 mcg/actuation nasal spray 1 spray by Each Nostril route daily as needed (congestion).    furosemide (LASIX) 40 MG tablet Take 1 tablet (40 mg total) by mouth daily as needed (leg swelling).    inhalation spacing device Use as directed for inhalation.    losartan (COZAAR) 50 MG tablet Take 1 tablet (50 mg total) by mouth once daily.    metoprolol succinate (TOPROL-XL) 100 MG 24 hr tablet Take 1 tablet (100 mg total) by mouth once daily.     rosuvastatin (CRESTOR) 5 MG tablet Take 1 tablet (5 mg total) by mouth once daily.    vitamin D (VITAMIN D3) 1000 units Tab Take 2 tablets (2,000 Units total) by mouth once daily.     Family History       Problem Relation (Age of Onset)    Arthritis Mother, Sister    Bone cancer Mother    Breast cancer Mother, Sister    Cancer Father    Crohn's disease Daughter    Fibroids Daughter    No Known Problems Brother, Daughter          Tobacco Use    Smoking status: Former     Current packs/day: 0.00     Average packs/day: 1 pack/day for 50.0 years (50.0 ttl pk-yrs)     Types: Cigarettes     Start date:      Quit date: 2009     Years since quittin.7    Smokeless tobacco: Never   Substance and Sexual Activity    Alcohol use: No    Drug use: No    Sexual activity: Not Currently     Partners: Male     Birth control/protection: Partner-Vasectomy     Review of Systems   Constitutional:  Positive for activity change. Negative for chills, diaphoresis, fatigue and fever.   HENT:  Negative for congestion, rhinorrhea and sore throat.    Respiratory:  Positive for cough and shortness of breath. Negative for chest tightness and wheezing.    Cardiovascular:  Positive for palpitations and leg swelling. Negative for chest pain.   Gastrointestinal:  Negative for abdominal distention, abdominal pain, blood in stool, constipation, diarrhea, nausea and vomiting.   Genitourinary:  Negative for difficulty urinating, dysuria, frequency, hematuria and urgency.   Musculoskeletal:  Negative for arthralgias, back pain and neck stiffness.   Neurological:  Negative for dizziness, tremors, seizures, syncope, weakness, light-headedness, numbness and headaches.   Psychiatric/Behavioral:  Negative for agitation, confusion and hallucinations.      Objective:     Vital Signs (Most Recent):  Temp: 97.6 °F (36.4 °C) (24 0938)  Pulse: (!) 122 (24 1315)  Resp: 19 (24 1315)  BP: (!) 156/109 (24 1315)  SpO2: 95 % (24  1315) Vital Signs (24h Range):  Temp:  [97.6 °F (36.4 °C)] 97.6 °F (36.4 °C)  Pulse:  [] 122  Resp:  [18-21] 19  SpO2:  [91 %-99 %] 95 %  BP: (122-175)/() 156/109     Weight: 75.5 kg (166 lb 6.4 oz)  Body mass index is 28.56 kg/m².     Physical Exam  Vitals and nursing note reviewed.   Constitutional:       General: She is not in acute distress.     Appearance: She is well-developed. She is obese. She is not diaphoretic.      Interventions: Nasal cannula in place.   HENT:      Head: Normocephalic and atraumatic.      Right Ear: External ear normal.      Left Ear: External ear normal.      Nose: Nose normal. No congestion.      Mouth/Throat:      Pharynx: Oropharynx is clear.   Eyes:      General: No scleral icterus.     Extraocular Movements: Extraocular movements intact.   Neck:      Vascular: JVD present.   Cardiovascular:      Rate and Rhythm: Tachycardia present.      Pulses: Normal pulses.      Heart sounds: No murmur heard.  Pulmonary:      Effort: Pulmonary effort is normal. No respiratory distress.      Breath sounds: Examination of the right-lower field reveals decreased breath sounds. Examination of the left-lower field reveals decreased breath sounds. Decreased breath sounds present. No wheezing or rales.   Abdominal:      General: Bowel sounds are normal. There is no distension.      Palpations: Abdomen is soft.      Tenderness: There is no abdominal tenderness. There is no guarding or rebound.   Musculoskeletal:      Cervical back: Normal range of motion.      Right lower leg: No edema.      Left lower leg: No edema.   Skin:     General: Skin is warm and dry.      Capillary Refill: Capillary refill takes less than 2 seconds.   Neurological:      General: No focal deficit present.      Mental Status: She is alert and oriented to person, place, and time. Mental status is at baseline.   Psychiatric:         Mood and Affect: Mood normal.         Behavior: Behavior normal.         Thought  Content: Thought content normal.                Significant Labs: All pertinent labs within the past 24 hours have been reviewed.  ABGs:   Recent Labs   Lab 03/01/24  1230   PH 7.361   PCO2 50.7*   HCO3 28.7*   POCSATURATED 18   BE 3*   PO2 15*     CBC:   Recent Labs   Lab 03/01/24  1148   WBC 10.35   HGB 15.2   HCT 45.7        CMP:   Recent Labs   Lab 03/01/24  1314      K 3.8      CO2 24      BUN 18   CREATININE 1.2   CALCIUM 10.3   PROT 6.4   ALBUMIN 3.0*   BILITOT 0.9   ALKPHOS 101   AST 31   ALT 19   ANIONGAP 9     Cardiac Markers:   Recent Labs   Lab 03/01/24  1148   BNP 1,261*     Troponin:   Recent Labs   Lab 03/01/24  1148   TROPONINI 0.020       Significant Imaging: I have reviewed all pertinent imaging results/findings within the past 24 hours.  Imaging Results              X-Ray Chest AP Portable (Final result)  Result time 03/01/24 11:36:22      Final result by Lobito Conte MD (03/01/24 11:36:22)                   Impression:      See above      Electronically signed by: Lobito Conte MD  Date:    03/01/2024  Time:    11:36               Narrative:    EXAMINATION:  XR CHEST AP PORTABLE    CLINICAL HISTORY:  dyspnea;    TECHNIQUE:  Single frontal view of the chest was performed.    COMPARISON:  N 08/15/2022 one    FINDINGS:  Mild cardiomegaly.  Accentuation of the interstitial markings and/or mild edema.  Blunted costophrenic angles bilaterally suggesting small amount of pleural effusion on both sides slightly more marked on the left.  The upper lung fields are clear.                                     Assessment/Plan:     * Acute on chronic diastolic congestive heart failure  Patient is identified as having Diastolic (HFpEF) heart failure that is Acute on chronic. CHF is currently uncontrolled due to Continued edema of extremities and JVD, >3 pillow orthopnea, and Pulmonary edema/pleural effusion on CXR. Latest ECHO performed and demonstrates- Results for orders placed  during the hospital encounter of 04/28/22    Echo    Interpretation Summary  · The left ventricle is normal in size with normal systolic function.  · The estimated ejection fraction is 60%.  · There are segmental left ventricular wall motion abnormalities.  · There is abnormal septal wall motion.  · Indeterminate left ventricular diastolic function.  · Normal right ventricular size with normal right ventricular systolic function.  · Mild mitral regurgitation.  · Mild tricuspid regurgitation.  · Intermediate central venous pressure (8 mmHg).  · The estimated PA systolic pressure is 31 mmHg.  . Continue Beta Blocker, ACE/ARB, and Furosemide and monitor clinical status closely. Monitor on telemetry. Patient is on CHF pathway.  Monitor strict Is&Os and daily weights.  Place on fluid restriction of 1.5 L. Cardiology has not been consulted. Continue to stress to patient importance of self efficacy and  on diet for CHF. Last BNP reviewed- and noted below   Recent Labs   Lab 03/01/24  1148   BNP 1,261*     -lasix 40 mg IV BID  -1.5-2L UOP daily goal  -echo pending  -tele  -K>4, Mg>2    Chronic respiratory failure with hypoxia  Patient with Hypercapnic Respiratory failure which is Chronic.  she is on home oxygen at 2 LPM. Supplemental oxygen was provided and noted-  Signs/symptoms of respiratory failure include- respiratory distress. Contributing diagnoses includes - CHF, COPD, Obesity Hypoventilation, and Pleural effusion Labs and images were reviewed. Patient Has recent ABG, which has been reviewed. Will treat underlying causes and adjust management of respiratory failure: see CHF    Centrilobular emphysema  Patient's COPD is controlled currently.  Patient is currently off COPD Pathway. Continue scheduled inhalers Supplemental oxygen and monitor respiratory status closely.     Hyperlipidemia  - formulary statin    Paroxysmal atrial fibrillation  Patient with Paroxysmal (<7 days) atrial fibrillation which is  controlled currently with Beta Blocker. Patient is currently in sinus rhythm.[id number] Score: 3. Anticoagulation indicated. Anticoagulation done with eliquis .    - continue eliquis and mtp succ.    CKD (chronic kidney disease) stage 3, GFR 30-59 ml/min  Creatine stable for now. BMP reviewed- noted Estimated Creatinine Clearance: 37.2 mL/min (based on SCr of 1.2 mg/dL). according to latest data. Based on current GFR, CKD stage is stage 3 - GFR 30-59.  Monitor UOP and serial BMP and adjust therapy as needed. Renally dose meds. Avoid nephrotoxic medications and procedures.  Monitor closely while diuresing    Essential hypertension  Chronic, uncontrolled. Latest blood pressure and vitals reviewed-     Temp:  [97.6 °F (36.4 °C)]   Pulse:  []   Resp:  [16-21]   BP: (122-181)/()   SpO2:  [91 %-99 %] .   Home meds for hypertension were reviewed and noted below.   Hypertension Medications               furosemide (LASIX) 40 MG tablet Take 1 tablet (40 mg total) by mouth daily as needed (leg swelling).    losartan (COZAAR) 50 MG tablet Take 1 tablet (50 mg total) by mouth once daily.    metoprolol succinate (TOPROL-XL) 100 MG 24 hr tablet Take 1 tablet (100 mg total) by mouth once daily.        While in the hospital, will manage blood pressure as follows; Continue home antihypertensive regimen    Will utilize p.r.n. blood pressure medication only if patient's blood pressure greater than 180/110 and she develops symptoms such as worsening chest pain or shortness of breath.      VTE Risk Mitigation (From admission, onward)           Ordered     apixaban tablet 5 mg  2 times daily         03/01/24 1449     IP VTE HIGH RISK PATIENT  Once         03/01/24 1449     Place sequential compression device  Until discontinued         03/01/24 1449                         On 03/01/2024, patient should be placed in hospital observation services under my care in collaboration with Dr. Clay.           Gaviota Viera,  NEHEMIAH  Department of Hospital Medicine  [facility] - Emergency Dept

## 2024-03-01 NOTE — ASSESSMENT & PLAN NOTE
Chronic, uncontrolled. Latest blood pressure and vitals reviewed-     Temp:  [97.6 °F (36.4 °C)]   Pulse:  []   Resp:  [16-21]   BP: (122-181)/()   SpO2:  [91 %-99 %] .   Home meds for hypertension were reviewed and noted below.   Hypertension Medications               furosemide (LASIX) 40 MG tablet Take 1 tablet (40 mg total) by mouth daily as needed (leg swelling).    losartan (COZAAR) 50 MG tablet Take 1 tablet (50 mg total) by mouth once daily.    metoprolol succinate (TOPROL-XL) 100 MG 24 hr tablet Take 1 tablet (100 mg total) by mouth once daily.        While in the hospital, will manage blood pressure as follows; Continue home antihypertensive regimen    Will utilize p.r.n. blood pressure medication only if patient's blood pressure greater than 180/110 and she develops symptoms such as worsening chest pain or shortness of breath.

## 2024-03-01 NOTE — SUBJECTIVE & OBJECTIVE
Past Medical History:   Diagnosis Date    Acute hypoxemic respiratory failure 6/26/2021    Acute superficial venous thrombosis of lower extremity, bilateral 1/25/2022    Aortic atherosclerosis     Arthritis     knee joint pain    Asthma-COPD overlap syndrome 8/22/2022    Breast cancer 2002    left breast & lymph nodes-s/p sx with chemo    Bronchitis     with flu-2/2014    Cataracts, bilateral     Chronic anticoagulation 5/4/2022    Chronic diastolic heart failure 12/24/2019    Chronic kidney disease, stage 3     History of chemotherapy     last treatment 12/2002 (had 8 treatments)    Hyperlipidemia 11/20/2016    Hypertension     Lymphedema of both lower extremities 1/25/2022    Nephrolithiasis 6/2/2014    Osteoporosis     Paroxysmal atrial fibrillation 6/7/2021    Renal calculi     hematuria    Renal osteodystrophy 1/14/2016    Venous stasis dermatitis of both lower extremities 1/25/2022    Vitamin D insufficiency        Past Surgical History:   Procedure Laterality Date    BREAST BIOPSY Left 2002    core bx, +    BREAST BIOPSY Right 2018    core    BREAST BIOPSY Right 2019    BREAST LUMPECTOMY Left 2002    CYSTOSCOPY  4/28/2022    Procedure: CYSTOSCOPY;  Surgeon: Vik Ware MD;  Location: [facility] OR [address] [place] [address];  Service: Urology;;    CYSTOSCOPY  5/30/2022    Procedure: CYSTOSCOPY;  Surgeon: Bonnie Knutson MD;  Location: [facility] OR [address] [place] [address];  Service: Urology;;    LASER LITHOTRIPSY  5/30/2022    Procedure: LITHOTRIPSY, USING LASER;  Surgeon: Bonnie Knutson MD;  Location: [facility] OR [address] [place] [address];  Service: Urology;;    LITHOTRIPSY      MASTECTOMY Left 06/2002    left-& lymph node dissection    REPLACEMENT OF STENT Right 5/30/2022    Procedure: REPLACEMENT, STENT;  Surgeon: Bonnie Knutson MD;  Location: [facility] OR [address] [place] [address];  Service: Urology;  Laterality: Right;    RETROGRADE PYELOGRAPHY Right 4/28/2022    Procedure: PYELOGRAM, RETROGRADE;  Surgeon: Vik Ware MD;  Location: [facility] OR [address] [place] [address];   Service: Urology;  Laterality: Right;    ROBOT-ASSISTED LAPAROSCOPIC PARTIAL NEPHRECTOMY USING DA JESÚS XI Right 3/11/2021    Procedure: XI ROBOTIC NEPHRECTOMY, PARTIAL;  Surgeon: Taz Ortega MD;  [facility];  Service: Urology;  Laterality: Right;  4hr/ gen with regional  [facility] confirmation:  [id number] for 11:15am case NC    URETEROSCOPIC REMOVAL OF URETERIC CALCULUS Right 5/30/2022    Procedure: REMOVAL, CALCULUS, URETER, URETEROSCOPIC;  Surgeon: Bonnie Knutson MD;  Location: [facility] OR 1ST FLR;  Service: Urology;  Laterality: Right;    ureteroscopy Bilateral     6.14    URETEROSCOPY Right 5/30/2022    Procedure: URETEROSCOPY;  Surgeon: Bonnie Knutson MD;  Location: [facility] OR [address] [place] [address];  Service: Urology;  Laterality: Right;       Review of patient's allergies indicates:   Allergen Reactions    Adhesive Rash     Pt states she removed a LATEX bandaid and the skin beneath was swollen and red. No other latex causes a reaction.       No current facility-administered medications on file prior to encounter.     Current Outpatient Medications on File Prior to Encounter   Medication Sig    acetaminophen (TYLENOL) 500 MG tablet Take 2 tablets (1,000 mg total) by mouth every 8 (eight) hours as needed for Pain.    apixaban (ELIQUIS) 5 mg Tab Take 1 tablet (5 mg total) by mouth 2 (two) times a day.    aspirin (ECOTRIN) 81 MG EC tablet Take 81 mg by mouth once daily.    biotin 1 mg tablet Take 1,000 mcg by mouth once daily.    budesonide-glycopyr-formoterol (BREZTRI AEROSPHERE) 160-9-4.8 mcg/actuation HFAA Inhale 2 puffs into the lungs 2 (two) times daily.    fluticasone propionate (FLONASE) 50 mcg/actuation nasal spray 1 spray by Each Nostril route daily as needed (congestion).    furosemide (LASIX) 40 MG tablet Take 1 tablet (40 mg total) by mouth daily as needed (leg swelling).    inhalation spacing device Use as directed for inhalation.    losartan (COZAAR) 50 MG tablet Take 1 tablet (50 mg  total) by mouth once daily.    metoprolol succinate (TOPROL-XL) 100 MG 24 hr tablet Take 1 tablet (100 mg total) by mouth once daily.    rosuvastatin (CRESTOR) 5 MG tablet Take 1 tablet (5 mg total) by mouth once daily.    vitamin D (VITAMIN D3) 1000 units Tab Take 2 tablets (2,000 Units total) by mouth once daily.     Family History       Problem Relation (Age of Onset)    Arthritis Mother, Sister    Bone cancer Mother    Breast cancer Mother, Sister    Cancer Father    Crohn's disease Daughter    Fibroids Daughter    No Known Problems Brother, Daughter          Tobacco Use    Smoking status: Former     Current packs/day: 0.00     Average packs/day: 1 pack/day for 50.0 years (50.0 ttl pk-yrs)     Types: Cigarettes     Start date:      Quit date: 2009     Years since quittin.7    Smokeless tobacco: Never   Substance and Sexual Activity    Alcohol use: No    Drug use: No    Sexual activity: Not Currently     Partners: Male     Birth control/protection: Partner-Vasectomy     Review of Systems   Constitutional:  Positive for activity change. Negative for chills, diaphoresis, fatigue and fever.   HENT:  Negative for congestion, rhinorrhea and sore throat.    Respiratory:  Positive for cough and shortness of breath. Negative for chest tightness and wheezing.    Cardiovascular:  Positive for palpitations and leg swelling. Negative for chest pain.   Gastrointestinal:  Negative for abdominal distention, abdominal pain, blood in stool, constipation, diarrhea, nausea and vomiting.   Genitourinary:  Negative for difficulty urinating, dysuria, frequency, hematuria and urgency.   Musculoskeletal:  Negative for arthralgias, back pain and neck stiffness.   Neurological:  Negative for dizziness, tremors, seizures, syncope, weakness, light-headedness, numbness and headaches.   Psychiatric/Behavioral:  Negative for agitation, confusion and hallucinations.      Objective:     Vital Signs (Most Recent):  Temp: 97.6 °F  (36.4 °C) (03/01/24 0938)  Pulse: (!) 122 (03/01/24 1315)  Resp: 19 (03/01/24 1315)  BP: (!) 156/109 (03/01/24 1315)  SpO2: 95 % (03/01/24 1315) Vital Signs (24h Range):  Temp:  [97.6 °F (36.4 °C)] 97.6 °F (36.4 °C)  Pulse:  [] 122  Resp:  [18-21] 19  SpO2:  [91 %-99 %] 95 %  BP: (122-175)/() 156/109     Weight: 75.5 kg (166 lb 6.4 oz)  Body mass index is 28.56 kg/m².     Physical Exam  Vitals and nursing note reviewed.   Constitutional:       General: She is not in acute distress.     Appearance: She is well-developed. She is obese. She is not diaphoretic.      Interventions: Nasal cannula in place.   HENT:      Head: Normocephalic and atraumatic.      Right Ear: External ear normal.      Left Ear: External ear normal.      Nose: Nose normal. No congestion.      Mouth/Throat:      Pharynx: Oropharynx is clear.   Eyes:      General: No scleral icterus.     Extraocular Movements: Extraocular movements intact.   Neck:      Vascular: JVD present.   Cardiovascular:      Rate and Rhythm: Tachycardia present.      Pulses: Normal pulses.      Heart sounds: No murmur heard.  Pulmonary:      Effort: Pulmonary effort is normal. No respiratory distress.      Breath sounds: Examination of the right-lower field reveals decreased breath sounds. Examination of the left-lower field reveals decreased breath sounds. Decreased breath sounds present. No wheezing or rales.   Abdominal:      General: Bowel sounds are normal. There is no distension.      Palpations: Abdomen is soft.      Tenderness: There is no abdominal tenderness. There is no guarding or rebound.   Musculoskeletal:      Cervical back: Normal range of motion.      Right lower leg: No edema.      Left lower leg: No edema.   Skin:     General: Skin is warm and dry.      Capillary Refill: Capillary refill takes less than 2 seconds.   Neurological:      General: No focal deficit present.      Mental Status: She is alert and oriented to person, place, and time.  Mental status is at baseline.   Psychiatric:         Mood and Affect: Mood normal.         Behavior: Behavior normal.         Thought Content: Thought content normal.                Significant Labs: All pertinent labs within the past 24 hours have been reviewed.  ABGs:   Recent Labs   Lab 03/01/24  1230   PH 7.361   PCO2 50.7*   HCO3 28.7*   POCSATURATED 18   BE 3*   PO2 15*     CBC:   Recent Labs   Lab 03/01/24  1148   WBC 10.35   HGB 15.2   HCT 45.7        CMP:   Recent Labs   Lab 03/01/24  1314      K 3.8      CO2 24      BUN 18   CREATININE 1.2   CALCIUM 10.3   PROT 6.4   ALBUMIN 3.0*   BILITOT 0.9   ALKPHOS 101   AST 31   ALT 19   ANIONGAP 9     Cardiac Markers:   Recent Labs   Lab 03/01/24  1148   BNP 1,261*     Troponin:   Recent Labs   Lab 03/01/24  1148   TROPONINI 0.020       Significant Imaging: I have reviewed all pertinent imaging results/findings within the past 24 hours.  Imaging Results              X-Ray Chest AP Portable (Final result)  Result time 03/01/24 11:36:22      Final result by Lobito Conte MD (03/01/24 11:36:22)                   Impression:      See above      Electronically signed by: Lobito Conte MD  Date:    03/01/2024  Time:    11:36               Narrative:    EXAMINATION:  XR CHEST AP PORTABLE    CLINICAL HISTORY:  dyspnea;    TECHNIQUE:  Single frontal view of the chest was performed.    COMPARISON:  N 08/15/2022 one    FINDINGS:  Mild cardiomegaly.  Accentuation of the interstitial markings and/or mild edema.  Blunted costophrenic angles bilaterally suggesting small amount of pleural effusion on both sides slightly more marked on the left.  The upper lung fields are clear.

## 2024-03-02 PROBLEM — E83.42 HYPOMAGNESEMIA: Status: ACTIVE | Noted: 2024-03-02

## 2024-03-02 PROBLEM — E87.6 HYPOKALEMIA: Status: ACTIVE | Noted: 2024-03-02

## 2024-03-02 LAB
ANION GAP SERPL CALC-SCNC: 13 MMOL/L (ref 8–16)
ASCENDING AORTA: 2.38 CM
AV INDEX (PROSTH): 0.77
AV MEAN GRADIENT: 8 MMHG
AV PEAK GRADIENT: 13 MMHG
AV VALVE AREA BY VELOCITY RATIO: 2.27 CM²
AV VALVE AREA: 2.34 CM²
AV VELOCITY RATIO: 0.75
BASOPHILS # BLD AUTO: 0.07 K/UL (ref 0–0.2)
BASOPHILS NFR BLD: 0.9 % (ref 0–1.9)
BSA FOR ECHO PROCEDURE: 1.89 M2
BUN SERPL-MCNC: 18 MG/DL (ref 8–23)
CALCIUM SERPL-MCNC: 8.5 MG/DL (ref 8.7–10.5)
CHLORIDE SERPL-SCNC: 111 MMOL/L (ref 95–110)
CO2 SERPL-SCNC: 21 MMOL/L (ref 23–29)
CREAT SERPL-MCNC: 1 MG/DL (ref 0.5–1.4)
CV ECHO LV RWT: 0.36 CM
DIFFERENTIAL METHOD BLD: ABNORMAL
DOP CALC AO PEAK VEL: 1.81 M/S
DOP CALC AO VTI: 34.46 CM
DOP CALC LVOT AREA: 3 CM2
DOP CALC LVOT DIAMETER: 1.97 CM
DOP CALC LVOT PEAK VEL: 1.35 M/S
DOP CALC LVOT STROKE VOLUME: 80.7 CM3
DOP CALC MV VTI: 46.2 CM
DOP CALCLVOT PEAK VEL VTI: 26.49 CM
E WAVE DECELERATION TIME: 224.99 MSEC
E/A RATIO: 1.08
E/E' RATIO: 21 M/S
ECHO LV POSTERIOR WALL: 0.65 CM (ref 0.6–1.1)
EOSINOPHIL # BLD AUTO: 0.2 K/UL (ref 0–0.5)
EOSINOPHIL NFR BLD: 2.9 % (ref 0–8)
ERYTHROCYTE [DISTWIDTH] IN BLOOD BY AUTOMATED COUNT: 13.7 % (ref 11.5–14.5)
EST. GFR  (NO RACE VARIABLE): 57 ML/MIN/1.73 M^2
FRACTIONAL SHORTENING: 24 % (ref 28–44)
GLUCOSE SERPL-MCNC: 69 MG/DL (ref 70–110)
HCT VFR BLD AUTO: 40.4 % (ref 37–48.5)
HGB BLD-MCNC: 12.5 G/DL (ref 12–16)
IMM GRANULOCYTES # BLD AUTO: 0.04 K/UL (ref 0–0.04)
IMM GRANULOCYTES NFR BLD AUTO: 0.5 % (ref 0–0.5)
INTERVENTRICULAR SEPTUM: 1.1 CM (ref 0.6–1.1)
IVRT: 125.59 MSEC
LA MAJOR: 5.13 CM
LA MINOR: 4.92 CM
LA WIDTH: 3.8 CM
LEFT ATRIUM SIZE: 2.39 CM
LEFT ATRIUM VOLUME INDEX MOD: 21 ML/M2
LEFT ATRIUM VOLUME INDEX: 21.1 ML/M2
LEFT ATRIUM VOLUME MOD: 38.62 CM3
LEFT ATRIUM VOLUME: 38.77 CM3
LEFT INTERNAL DIMENSION IN SYSTOLE: 2.74 CM (ref 2.1–4)
LEFT VENTRICLE DIASTOLIC VOLUME INDEX: 29.73 ML/M2
LEFT VENTRICLE DIASTOLIC VOLUME: 54.71 ML
LEFT VENTRICLE MASS INDEX: 49 G/M2
LEFT VENTRICLE SYSTOLIC VOLUME INDEX: 15.2 ML/M2
LEFT VENTRICLE SYSTOLIC VOLUME: 27.94 ML
LEFT VENTRICULAR INTERNAL DIMENSION IN DIASTOLE: 3.61 CM (ref 3.5–6)
LEFT VENTRICULAR MASS: 89.58 G
LV LATERAL E/E' RATIO: 21 M/S
LV SEPTAL E/E' RATIO: 21 M/S
LYMPHOCYTES # BLD AUTO: 1.9 K/UL (ref 1–4.8)
LYMPHOCYTES NFR BLD: 25 % (ref 18–48)
MAGNESIUM SERPL-MCNC: 1.4 MG/DL (ref 1.6–2.6)
MCH RBC QN AUTO: 30.3 PG (ref 27–31)
MCHC RBC AUTO-ENTMCNC: 30.9 G/DL (ref 32–36)
MCV RBC AUTO: 98 FL (ref 82–98)
MONOCYTES # BLD AUTO: 0.9 K/UL (ref 0.3–1)
MONOCYTES NFR BLD: 11.4 % (ref 4–15)
MV MEAN GRADIENT: 2 MMHG
MV PEAK A VEL: 0.97 M/S
MV PEAK E VEL: 1.05 M/S
MV PEAK GRADIENT: 5 MMHG
MV STENOSIS PRESSURE HALF TIME: 65.25 MS
MV VALVE AREA BY CONTINUITY EQUATION: 1.75 CM2
MV VALVE AREA P 1/2 METHOD: 3.37 CM2
NEUTROPHILS # BLD AUTO: 4.5 K/UL (ref 1.8–7.7)
NEUTROPHILS NFR BLD: 59.3 % (ref 38–73)
NRBC BLD-RTO: 0 /100 WBC
PISA TR MAX VEL: 2.92 M/S
PLATELET # BLD AUTO: 183 K/UL (ref 150–450)
PMV BLD AUTO: 10 FL (ref 9.2–12.9)
POTASSIUM SERPL-SCNC: 3.2 MMOL/L (ref 3.5–5.1)
RA MAJOR: 3.96 CM
RA PRESSURE ESTIMATED: 3 MMHG
RA WIDTH: 2.71 CM
RBC # BLD AUTO: 4.12 M/UL (ref 4–5.4)
RV TB RVSP: 6 MMHG
SINUS: 2.85 CM
SODIUM SERPL-SCNC: 145 MMOL/L (ref 136–145)
STJ: 2.33 CM
TDI LATERAL: 0.05 M/S
TDI SEPTAL: 0.05 M/S
TDI: 0.05 M/S
TR MAX PG: 34 MMHG
TRICUSPID ANNULAR PLANE SYSTOLIC EXCURSION: 2.23 CM
TV REST PULMONARY ARTERY PRESSURE: 37 MMHG
WBC # BLD AUTO: 7.53 K/UL (ref 3.9–12.7)
Z-SCORE OF LEFT VENTRICULAR DIMENSION IN END DIASTOLE: -3.46
Z-SCORE OF LEFT VENTRICULAR DIMENSION IN END SYSTOLE: -1.11

## 2024-03-02 PROCEDURE — 63600175 PHARM REV CODE 636 W HCPCS: Performed by: PHYSICIAN ASSISTANT

## 2024-03-02 PROCEDURE — G0378 HOSPITAL OBSERVATION PER HR: HCPCS

## 2024-03-02 PROCEDURE — 94761 N-INVAS EAR/PLS OXIMETRY MLT: CPT

## 2024-03-02 PROCEDURE — 80048 BASIC METABOLIC PNL TOTAL CA: CPT | Performed by: PHYSICIAN ASSISTANT

## 2024-03-02 PROCEDURE — 96376 TX/PRO/DX INJ SAME DRUG ADON: CPT

## 2024-03-02 PROCEDURE — 27000221 HC OXYGEN, UP TO 24 HOURS

## 2024-03-02 PROCEDURE — 85025 COMPLETE CBC W/AUTO DIFF WBC: CPT

## 2024-03-02 PROCEDURE — 96365 THER/PROPH/DIAG IV INF INIT: CPT | Mod: 59

## 2024-03-02 PROCEDURE — 63600175 PHARM REV CODE 636 W HCPCS

## 2024-03-02 PROCEDURE — 83735 ASSAY OF MAGNESIUM: CPT | Performed by: STUDENT IN AN ORGANIZED HEALTH CARE EDUCATION/TRAINING PROGRAM

## 2024-03-02 PROCEDURE — 96366 THER/PROPH/DIAG IV INF ADDON: CPT

## 2024-03-02 PROCEDURE — 99900035 HC TECH TIME PER 15 MIN (STAT)

## 2024-03-02 PROCEDURE — 36415 COLL VENOUS BLD VENIPUNCTURE: CPT

## 2024-03-02 PROCEDURE — 25000242 PHARM REV CODE 250 ALT 637 W/ HCPCS: Performed by: PHYSICIAN ASSISTANT

## 2024-03-02 PROCEDURE — 25000003 PHARM REV CODE 250

## 2024-03-02 PROCEDURE — 94640 AIRWAY INHALATION TREATMENT: CPT

## 2024-03-02 PROCEDURE — 25000003 PHARM REV CODE 250: Performed by: PHYSICIAN ASSISTANT

## 2024-03-02 RX ORDER — MAGNESIUM SULFATE HEPTAHYDRATE 40 MG/ML
2 INJECTION, SOLUTION INTRAVENOUS ONCE
Status: COMPLETED | OUTPATIENT
Start: 2024-03-02 | End: 2024-03-02

## 2024-03-02 RX ORDER — FUROSEMIDE 10 MG/ML
80 INJECTION INTRAMUSCULAR; INTRAVENOUS 2 TIMES DAILY
Status: DISCONTINUED | OUTPATIENT
Start: 2024-03-02 | End: 2024-03-03 | Stop reason: HOSPADM

## 2024-03-02 RX ORDER — POTASSIUM CHLORIDE 750 MG/1
30 CAPSULE, EXTENDED RELEASE ORAL ONCE
Status: COMPLETED | OUTPATIENT
Start: 2024-03-02 | End: 2024-03-02

## 2024-03-02 RX ADMIN — METOPROLOL SUCCINATE 100 MG: 100 TABLET, EXTENDED RELEASE ORAL at 08:03

## 2024-03-02 RX ADMIN — ATORVASTATIN CALCIUM 20 MG: 20 TABLET, FILM COATED ORAL at 08:03

## 2024-03-02 RX ADMIN — CHOLECALCIFEROL TAB 25 MCG (1000 UNIT) 2000 UNITS: 25 TAB at 08:03

## 2024-03-02 RX ADMIN — POTASSIUM CHLORIDE 30 MEQ: 10 CAPSULE, COATED, EXTENDED RELEASE ORAL at 10:03

## 2024-03-02 RX ADMIN — FUROSEMIDE 80 MG: 10 INJECTION, SOLUTION INTRAVENOUS at 05:03

## 2024-03-02 RX ADMIN — APIXABAN 5 MG: 5 TABLET, FILM COATED ORAL at 08:03

## 2024-03-02 RX ADMIN — LOSARTAN POTASSIUM 50 MG: 50 TABLET, FILM COATED ORAL at 08:03

## 2024-03-02 RX ADMIN — FLUTICASONE FUROATE AND VILANTEROL TRIFENATATE 1 PUFF: 100; 25 POWDER RESPIRATORY (INHALATION) at 08:03

## 2024-03-02 RX ADMIN — FUROSEMIDE 40 MG: 10 INJECTION, SOLUTION INTRAMUSCULAR; INTRAVENOUS at 08:03

## 2024-03-02 RX ADMIN — ASPIRIN 81 MG: 81 TABLET, COATED ORAL at 08:03

## 2024-03-02 RX ADMIN — MAGNESIUM SULFATE HEPTAHYDRATE 2 G: 40 INJECTION, SOLUTION INTRAVENOUS at 10:03

## 2024-03-02 NOTE — CONSULTS
Food & Nutrition  Education    Diet Education: Low Na  Time Spent: 10 minutes  Learners: Patient, 1 family member     Nutrition Education provided with handouts. All questions and concerns answered. Dietitian's contact information provided.     Follow-Up: Yes    Pt reports good appetite PTA/currently & UBW of 170# - appears nourished w/ no indicators of malnutrition.     Please re-consult as needed.    Thanks!  MS Shyanne, RD, LDN

## 2024-03-02 NOTE — NURSING
Nurses Note -- 4 Eyes      3/1/2024   6:00 PM      Skin assessed during: Admit      [x] No Altered Skin Integrity Present    [x]Prevention Measures Documented      [] Yes- Altered Skin Integrity Present or Discovered   [] LDA Added if Not in Epic (Describe Wound)   [] New Altered Skin Integrity was Present on Admit and Documented in LDA   [] Wound Image Taken    Wound Care Consulted? No    Attending Nurse:  Mary Hart RN/Staff Member:   Jade

## 2024-03-02 NOTE — PROGRESS NOTES
"[facility] [address] [place] [address] Medicine  Progress Note    Patient Name: Misa Barajas  MRN: [id number]  Patient Class: OP- Observation   Admission Date: 3/1/2024  Length of Stay: 0 days  Attending Physician: Kaitlynn Clay MD  Primary Care Provider: Celia Carlisle MD        Subjective:     Principal Problem:Acute on chronic diastolic congestive heart failure        HPI:  Ms. Misa Barajas is a 78-year-old female with a past medical history of COPD and HTN/ HLD complicated by HFpEF (60% on 5/4/22) on 2 L home O2, and pAF (eliquis) now presenting with shortness of breath and leg swelling. Patient reports that for the past 6 weeks she has experienced worsening shortness of breath with ambulation which acutely worsened this morning. Patient denies any recent fevers, viral type illnesses, or chest pain. Endorses dry cough. Patient reports taking albuterol inhaler without improvement. She also endorses orthopnea and b/l lower extremity swelling. Reports that she stopped taking her Lasix and only took half a pill "as needed" within the last 2 weeks because it was making her lightheaded. She is noncompliant w/ low sodium diet. Otherwise, Pt reports taking her home medications as prescribed and had taken them prior to arrival to ED. Further denies any weakness, fatigue, abdominal pain, n/v/d, dysuria or change in BM.    In ED, Pt hypertensive 156/109, sats 95% on 2L NC, . No leukocytosis. Electrolytes wnl. BNP 1,261, trop 0.02. PH 7.36, PCO2 50.7, PO2 15, HCO3 28.7. CXR w/ mild cardiomegaly, blunted costophrenic angles b/l, and mild edema. Given lasix 80 mg IV x1.     Overview/Hospital Course:  Misa Barajas is placed in  Observation for management of acute on chronic heart failure exacerbation. At home patient has been noncompliant with lasix dosing. BNP elevated on admission, CXR with mild edema. Started on IV lasix. Monitoring UOP. Echo pending.     Interval History: NAEON, VSSAF, on home 2L NC. " Insufficient urine output overnight, will continue IV lasix at increased dose this afternoon. Echo pending. Replace potassium.     Review of Systems   Constitutional:  Positive for activity change. Negative for chills, diaphoresis, fatigue and fever.   HENT:  Negative for congestion, rhinorrhea and sore throat.    Respiratory:  Positive for cough and shortness of breath. Negative for chest tightness and wheezing.    Cardiovascular:  Positive for palpitations and leg swelling. Negative for chest pain.   Gastrointestinal:  Negative for abdominal distention, abdominal pain, blood in stool, constipation, diarrhea, nausea and vomiting.   Genitourinary:  Negative for difficulty urinating, dysuria, frequency, hematuria and urgency.   Musculoskeletal:  Negative for arthralgias, back pain and neck stiffness.   Neurological:  Negative for dizziness, tremors, seizures, syncope, weakness, light-headedness, numbness and headaches.   Psychiatric/Behavioral:  Negative for agitation, confusion and hallucinations.      Objective:     Vital Signs (Most Recent):  Temp: 97.9 °F (36.6 °C) (03/02/24 0756)  Pulse: 69 (03/02/24 0830)  Resp: 16 (03/02/24 0815)  BP: (!) 141/65 (03/02/24 0830)  SpO2: 96 % (03/02/24 0815) Vital Signs (24h Range):  Temp:  [97.8 °F (36.6 °C)-98.5 °F (36.9 °C)] 97.9 °F (36.6 °C)  Pulse:  [] 69  Resp:  [16-21] 16  SpO2:  [92 %-99 %] 96 %  BP: (109-206)/() 141/65     Weight: 78.9 kg (174 lb)  Body mass index is 29.87 kg/m².    Intake/Output Summary (Last 24 hours) at 3/2/2024 1036  Last data filed at 3/2/2024 0643  Gross per 24 hour   Intake 480 ml   Output 450 ml   Net 30 ml         Physical Exam  Vitals and nursing note reviewed.   Constitutional:       General: She is not in acute distress.     Appearance: She is well-developed. She is obese. She is not diaphoretic.      Interventions: Nasal cannula in place.   HENT:      Head: Normocephalic and atraumatic.      Right Ear: External ear normal.       Left Ear: External ear normal.      Nose: Nose normal. No congestion.      Mouth/Throat:      Pharynx: Oropharynx is clear.   Eyes:      General: No scleral icterus.     Extraocular Movements: Extraocular movements intact.   Neck:      Vascular: JVD present.   Cardiovascular:      Rate and Rhythm: Normal rate.      Pulses: Normal pulses.      Heart sounds: No murmur heard.  Pulmonary:      Effort: Pulmonary effort is normal. No respiratory distress.      Breath sounds: Examination of the right-lower field reveals decreased breath sounds. Examination of the left-lower field reveals decreased breath sounds. Decreased breath sounds present. No wheezing or rales.   Abdominal:      General: Bowel sounds are normal. There is no distension.      Palpations: Abdomen is soft.      Tenderness: There is no abdominal tenderness. There is no guarding or rebound.   Musculoskeletal:      Cervical back: Normal range of motion.      Right lower leg: No edema.      Left lower leg: No edema.   Skin:     General: Skin is warm and dry.      Capillary Refill: Capillary refill takes less than 2 seconds.   Neurological:      General: No focal deficit present.      Mental Status: She is alert and oriented to person, place, and time. Mental status is at baseline.   Psychiatric:         Mood and Affect: Mood normal.         Behavior: Behavior normal.         Thought Content: Thought content normal.             Significant Labs: All pertinent labs within the past 24 hours have been reviewed.  CBC:   Recent Labs   Lab 03/01/24  1148 03/02/24  0912   WBC 10.35 7.53   HGB 15.2 12.5   HCT 45.7 40.4    183     CMP:   Recent Labs   Lab 03/01/24  1314 03/02/24  0912    145   K 3.8 3.2*    111*   CO2 24 21*    69*   BUN 18 18   CREATININE 1.2 1.0   CALCIUM 10.3 8.5*   PROT 6.4  --    ALBUMIN 3.0*  --    BILITOT 0.9  --    ALKPHOS 101  --    AST 31  --    ALT 19  --    ANIONGAP 9 13       Significant Imaging: I have reviewed  all pertinent imaging results/findings within the past 24 hours.    Assessment/Plan:      * Acute on chronic diastolic congestive heart failure  Diastolic (HFpEF), acute on chronic. Currently uncontrolled due to extremity edema, JVD, > 3 pillow orthopnea, pulmonary edema on CXR. Has been noncompliant with home lasix dosing.     - CHF pathway, strict I/O, 1.5L fluid restriction, daily weights  - last Echo 04/2022 - EF 60%, normal systolic function, CVP 8    - cont IV lasix 80mg bid  - cont BB, losartan    -- if BP soft with increased lasix dose, can hold losartan  - 1.5-2L UOP daily goal   - repeat echo pending   - tele  - keep K > 4, Mg > 2    Hypomagnesemia  - Mag 1.4, replaced  - monitor on daily labs    Hypokalemia  - K 3.2, replaced  - monitor on daily labs    Chronic respiratory failure with hypoxia  Patient with Hypercapnic Respiratory failure which is Chronic.  she is on home oxygen at 2 LPM. Supplemental oxygen was provided and noted-  Signs/symptoms of respiratory failure include- respiratory distress. Contributing diagnoses includes - CHF, COPD, Obesity Hypoventilation, and Pleural effusion Labs and images were reviewed. Patient Has recent ABG, which has been reviewed. Will treat underlying causes and adjust management of respiratory failure: see CHF    Centrilobular emphysema  - controlled, off pathway  - cont inhalers and supplemental oxygen prn  - monitor respiratory status closely    Hyperlipidemia  - formulary statin    Paroxysmal atrial fibrillation  - currently controlled, [id number] 3  - continue toprol-xl and eliquis  - tele     CKD (chronic kidney disease) stage 3, GFR 30-59 ml/min  - CKD 3  - avoid nephrotoxic meds, renal dosing  - monitor closely while diuresing    Essential hypertension  - chronic, uncontrolled on admission, now improving  - continue IV lasix  - continue home losartan 50mg daily and toprol-xl 100mg daily   - prn hydralazine for sbp > 180      VTE Risk Mitigation (From  admission, onward)           Ordered     apixaban tablet 5 mg  2 times daily         03/01/24 1449     IP VTE HIGH RISK PATIENT  Once         03/01/24 1449     Place sequential compression device  Until discontinued         03/01/24 1449                    Discharge Planning   NOHEMI: 3/3/2024     Code Status: Full Code   Is the patient medically ready for discharge?: No    Reason for patient still in hospital (select all that apply): Patient trending condition, Laboratory test, Treatment, and Imaging                     Ángel Carlson PA-C  Department of Hospital Medicine   [facility] 11H

## 2024-03-02 NOTE — ASSESSMENT & PLAN NOTE
- chronic, uncontrolled on admission, now improving  - continue IV lasix  - continue home losartan 50mg daily and toprol-xl 100mg daily   - prn hydralazine for sbp > 180

## 2024-03-02 NOTE — AI DETERIORATION ALERT
"RAPID RESPONSE NURSE AI ALERT       AI alert received.    Chart Reviewed: 03/01/2024, 6:07 PM    MRN: [id number]  Bed: 1111/1111 A    Dx: Acute on chronic diastolic congestive heart failure    Misa Barajas has a past medical history of Acute hypoxemic respiratory failure, Acute superficial venous thrombosis of lower extremity, bilateral, Aortic atherosclerosis, Arthritis, Asthma-COPD overlap syndrome, Breast cancer, Bronchitis, Cataracts, bilateral, Chronic anticoagulation, Chronic diastolic heart failure, Chronic kidney disease, stage 3, History of chemotherapy, Hyperlipidemia, Hypertension, Lymphedema of both lower extremities, Nephrolithiasis, Osteoporosis, Paroxysmal atrial fibrillation, Renal calculi, Renal osteodystrophy, Venous stasis dermatitis of both lower extremities, and Vitamin D insufficiency.    Last VS: BP (!) 189/77 (BP Location: Right arm, Patient Position: Lying)   Pulse 83   Temp 98.5 °F (36.9 °C) (Oral)   Resp 17   Ht 5' 4" (1.626 m)   Wt 79.1 kg (174 lb 6.1 oz)   SpO2 (!) 92%   Breastfeeding No   BMI 29.93 kg/m²     24H Vital Sign Range:  Temp:  [97.6 °F (36.4 °C)-98.5 °F (36.9 °C)]   Pulse:  []   Resp:  [16-21]   BP: (122-206)/()   SpO2:  [91 %-99 %]     Level of Consciousness (AVPU): alert    Recent Labs     03/01/24  1148   WBC 10.35   HGB 15.2   HCT 45.7          Recent Labs     03/01/24  1314      K 3.8      CO2 24   BUN 18   CREATININE 1.2      MG 1.8        Recent Labs     03/01/24  1230   PH 7.361   PCO2 50.7*   PO2 15*   HCO3 28.7*   POCSATURATED 18   BE 3*        OXYGEN:  Flow (L/min): 2          MEWS score: 3    Bedside RNMary  contacted. No concerns verbalized at this time. Instructed to call [phone] for further concerns or assistance.    Hi Gomez RN        "

## 2024-03-02 NOTE — PLAN OF CARE
Problem: Adult Inpatient Plan of Care  Goal: Plan of Care Review  Outcome: Ongoing, Progressing  Goal: Patient-Specific Goal (Individualized)  Outcome: Ongoing, Progressing  Goal: Absence of Hospital-Acquired Illness or Injury  Outcome: Ongoing, Progressing  Goal: Optimal Comfort and Wellbeing  Outcome: Ongoing, Progressing  Goal: Readiness for Transition of Care  Outcome: Ongoing, Progressing     Problem: Fall Injury Risk  Goal: Absence of Fall and Fall-Related Injury  Outcome: Ongoing, Progressing     Problem: Heart Failure Comorbidity  Goal: Maintenance of Heart Failure Symptom Control  Outcome: Ongoing, Progressing     Problem: Hypertension Comorbidity  Goal: Blood Pressure in Desired Range  Outcome: Ongoing, Progressing     Problem: Adjustment to Illness COPD (Chronic Obstructive Pulmonary Disease)  Goal: Optimal Chronic Illness Coping  Outcome: Ongoing, Progressing     Problem: Functional Ability Impaired COPD (Chronic Obstructive Pulmonary Disease)  Goal: Optimal Level of Functional [place]  Outcome: Ongoing, Progressing     Problem: Infection COPD (Chronic Obstructive Pulmonary Disease)  Goal: Absence of Infection Signs and Symptoms  Outcome: Ongoing, Progressing     Problem: Oral Intake Inadequate COPD (Chronic Obstructive Pulmonary Disease)  Goal: Improved Nutrition Intake  Outcome: Ongoing, Progressing     Problem: Respiratory Compromise COPD (Chronic Obstructive Pulmonary Disease)  Goal: Effective Oxygenation and Ventilation  Outcome: Ongoing, Progressing     Problem: ARDS (Acute Respiratory Distress Syndrome)  Goal: Effective Oxygenation  Outcome: Ongoing, Progressing     Problem: Airway Clearance Ineffective  Goal: Effective Airway Clearance  Outcome: Ongoing, Progressing     Problem: Adjustment to Illness (Chronic Kidney Disease)  Goal: Optimal Coping with Chronic Illness  Outcome: Ongoing, Progressing     Problem: Electrolyte Imbalance (Chronic Kidney Disease)  Goal: Electrolyte  Balance  Outcome: Ongoing, Progressing     Problem: Fluid Volume Excess (Chronic Kidney Disease)  Goal: Fluid Balance  Outcome: Ongoing, Progressing     Problem: Functional Decline (Chronic Kidney Disease)  Goal: Optimal Functional Ability  Outcome: Ongoing, Progressing     Problem: Hematologic Alteration (Chronic Kidney Disease)  Goal: Absence of Anemia Signs and Symptoms  Outcome: Ongoing, Progressing     Problem: Oral Intake Inadequate (Chronic Kidney Disease)  Goal: Optimal Oral Intake  Outcome: Ongoing, Progressing     Problem: Pain (Chronic Kidney Disease)  Goal: Acceptable Pain Control  Outcome: Ongoing, Progressing     Problem: Renal Function Impairment (Chronic Kidney Disease)  Goal: Minimize Renal Failure Effects  Outcome: Ongoing, Progressing     Problem: Dysrhythmia  Goal: Normalized Cardiac Rhythm  Outcome: Ongoing, Progressing     Problem: Infection  Goal: Absence of Infection Signs and Symptoms  Outcome: Ongoing, Progressing     Problem: Impaired Wound Healing  Goal: Optimal Wound Healing  Outcome: Ongoing, Progressing

## 2024-03-02 NOTE — PLAN OF CARE
Problem: Adult Inpatient Plan of Care  Goal: Plan of Care Review  Outcome: Ongoing, Progressing  Goal: Patient-Specific Goal (Individualized)  Outcome: Ongoing, Progressing  Goal: Absence of Hospital-Acquired Illness or Injury  Outcome: Ongoing, Progressing  Goal: Optimal Comfort and Wellbeing  Outcome: Ongoing, Progressing  Goal: Readiness for Transition of Care  Outcome: Ongoing, Progressing     Problem: Fall Injury Risk  Goal: Absence of Fall and Fall-Related Injury  Outcome: Ongoing, Progressing     Problem: Heart Failure Comorbidity  Goal: Maintenance of Heart Failure Symptom Control  Outcome: Ongoing, Progressing     Problem: Hypertension Comorbidity  Goal: Blood Pressure in Desired Range  Outcome: Ongoing, Progressing     Problem: Adjustment to Illness COPD (Chronic Obstructive Pulmonary Disease)  Goal: Optimal Chronic Illness Coping  Outcome: Ongoing, Progressing     Problem: Functional Ability Impaired COPD (Chronic Obstructive Pulmonary Disease)  Goal: Optimal Level of Functional [place]  Outcome: Ongoing, Progressing     Problem: Infection COPD (Chronic Obstructive Pulmonary Disease)  Goal: Absence of Infection Signs and Symptoms  Outcome: Ongoing, Progressing     Problem: Oral Intake Inadequate COPD (Chronic Obstructive Pulmonary Disease)  Goal: Improved Nutrition Intake  Outcome: Ongoing, Progressing     Problem: Respiratory Compromise COPD (Chronic Obstructive Pulmonary Disease)  Goal: Effective Oxygenation and Ventilation  Outcome: Ongoing, Progressing     Problem: ARDS (Acute Respiratory Distress Syndrome)  Goal: Effective Oxygenation  Outcome: Ongoing, Progressing     Problem: Airway Clearance Ineffective  Goal: Effective Airway Clearance  Outcome: Ongoing, Progressing     Problem: Adjustment to Illness (Chronic Kidney Disease)  Goal: Optimal Coping with Chronic Illness  Outcome: Ongoing, Progressing     Problem: Electrolyte Imbalance (Chronic Kidney Disease)  Goal: Electrolyte  Balance  Outcome: Ongoing, Progressing     Problem: Fluid Volume Excess (Chronic Kidney Disease)  Goal: Fluid Balance  Outcome: Ongoing, Progressing     Problem: Functional Decline (Chronic Kidney Disease)  Goal: Optimal Functional Ability  Outcome: Ongoing, Progressing     Problem: Hematologic Alteration (Chronic Kidney Disease)  Goal: Absence of Anemia Signs and Symptoms  Outcome: Ongoing, Progressing     Problem: Oral Intake Inadequate (Chronic Kidney Disease)  Goal: Optimal Oral Intake  Outcome: Ongoing, Progressing     Problem: Pain (Chronic Kidney Disease)  Goal: Acceptable Pain Control  Outcome: Ongoing, Progressing     Problem: Renal Function Impairment (Chronic Kidney Disease)  Goal: Minimize Renal Failure Effects  Outcome: Ongoing, Progressing     Problem: Dysrhythmia  Goal: Normalized Cardiac Rhythm  Outcome: Ongoing, Progressing     Problem: Infection  Goal: Absence of Infection Signs and Symptoms  Outcome: Ongoing, Progressing     Problem: Impaired Wound Healing  Goal: Optimal Wound Healing  Outcome: Ongoing, Progressing

## 2024-03-02 NOTE — ASSESSMENT & PLAN NOTE
Diastolic (HFpEF), acute on chronic. Currently uncontrolled due to extremity edema, JVD, > 3 pillow orthopnea, pulmonary edema on CXR. Has been noncompliant with home lasix dosing.     - CHF pathway, strict I/O, 1.5L fluid restriction, daily weights  - last Echo 04/2022 - EF 60%, normal systolic function, CVP 8    - cont IV lasix 80mg bid  - cont BB, losartan    -- if BP soft with increased lasix dose, can hold losartan  - 1.5-2L UOP daily goal   - repeat echo pending   - tele  - keep K > 4, Mg > 2

## 2024-03-02 NOTE — ASSESSMENT & PLAN NOTE
- controlled, off pathway  - cont inhalers and supplemental oxygen prn  - monitor respiratory status closely

## 2024-03-02 NOTE — NURSING
"Hospital problems reviewed. Care plan started to reflect the patients "current" hospital problem list "as of now".  "

## 2024-03-02 NOTE — PLAN OF CARE
Problem: Adult Inpatient Plan of Care  Goal: Plan of Care Review  Outcome: Ongoing, Progressing  Goal: Patient-Specific Goal (Individualized)  Outcome: Ongoing, Progressing  Goal: Absence of Hospital-Acquired Illness or Injury  Outcome: Ongoing, Progressing  Goal: Optimal Comfort and Wellbeing  Outcome: Ongoing, Progressing  Goal: Readiness for Transition of Care  Outcome: Ongoing, Progressing     Problem: Fall Injury Risk  Goal: Absence of Fall and Fall-Related Injury  Outcome: Ongoing, Progressing     Problem: Heart Failure Comorbidity  Goal: Maintenance of Heart Failure Symptom Control  Outcome: Ongoing, Progressing     Problem: Hypertension Comorbidity  Goal: Blood Pressure in Desired Range  Outcome: Ongoing, Progressing     Problem: Adjustment to Illness COPD (Chronic Obstructive Pulmonary Disease)  Goal: Optimal Chronic Illness Coping  Outcome: Ongoing, Progressing     Problem: Functional Ability Impaired COPD (Chronic Obstructive Pulmonary Disease)  Goal: Optimal Level of Functional [place]  Outcome: Ongoing, Progressing     Problem: Infection COPD (Chronic Obstructive Pulmonary Disease)  Goal: Absence of Infection Signs and Symptoms  Outcome: Ongoing, Progressing     Problem: Oral Intake Inadequate COPD (Chronic Obstructive Pulmonary Disease)  Goal: Improved Nutrition Intake  Outcome: Ongoing, Progressing     Problem: Respiratory Compromise COPD (Chronic Obstructive Pulmonary Disease)  Goal: Effective Oxygenation and Ventilation  Outcome: Ongoing, Progressing     Problem: ARDS (Acute Respiratory Distress Syndrome)  Goal: Effective Oxygenation  Outcome: Ongoing, Progressing     Problem: Airway Clearance Ineffective  Goal: Effective Airway Clearance  Outcome: Ongoing, Progressing     Problem: Adjustment to Illness (Chronic Kidney Disease)  Goal: Optimal Coping with Chronic Illness  Outcome: Ongoing, Progressing     Problem: Electrolyte Imbalance (Chronic Kidney Disease)  Goal: Electrolyte  Balance  Outcome: Ongoing, Progressing     Problem: Fluid Volume Excess (Chronic Kidney Disease)  Goal: Fluid Balance  Outcome: Ongoing, Progressing     Problem: Functional Decline (Chronic Kidney Disease)  Goal: Optimal Functional Ability  Outcome: Ongoing, Progressing     Problem: Hematologic Alteration (Chronic Kidney Disease)  Goal: Absence of Anemia Signs and Symptoms  Outcome: Ongoing, Progressing     Problem: Oral Intake Inadequate (Chronic Kidney Disease)  Goal: Optimal Oral Intake  Outcome: Ongoing, Progressing     Problem: Pain (Chronic Kidney Disease)  Goal: Acceptable Pain Control  Outcome: Ongoing, Progressing     Problem: Renal Function Impairment (Chronic Kidney Disease)  Goal: Minimize Renal Failure Effects  Outcome: Ongoing, Progressing     Problem: Dysrhythmia  Goal: Normalized Cardiac Rhythm  Outcome: Ongoing, Progressing     Problem: Infection  Goal: Absence of Infection Signs and Symptoms  Outcome: Ongoing, Progressing     Problem: Impaired Wound Healing  Goal: Optimal Wound Healing  Outcome: Ongoing, Progressing

## 2024-03-02 NOTE — HOSPITAL COURSE
Misa Barajas is placed in  Observation for management of acute on chronic heart failure exacerbation. At home patient has been noncompliant with lasix dosing. BNP elevated on admission, CXR with mild edema. Started on IV lasix. Monitoring UOP. Echo with EF 60-65%, normal systolic function, G2DD, CVP 3. Sufficient UOP with IV diuresis. Patient symptomatically improving. Patient will start lasix 40mg daily at discharge and start cefdinir to complete antibiotic course for UTI. She will follow up with PCP and Cardiology. Instructed to track blood pressures three times daily and bring to follow up Cardiology appointment. Home Health ordered. Return precautions provided. Patient medically ready for discharge. Plan of care discussed with patient, patient agreeable to plan, and all questions answered.      Physical Exam    General: NAD, appears stated age.  Neuro: AAOx4. Motor, sensory, and strength grossly intact bilaterally.  HEENT: EOMI. No JVD appreciated. Home O2 in place.  CVS: RRR, no m/r/g; S1/S2 auscultated. Capillary refill < 2 sec.  Respiratory: LCTAB. No labored breathing or accessory muscle use appreciated.   Abdominal: Normal bowel sounds in all 4 quadrants. NTND, soft to palpation.    Extremities: pulses full, equal, and regular over all 4 extremities.

## 2024-03-02 NOTE — PHARMACY MED REC
"  Admission Medication History     The home medication history was taken by Dominic Gant.    You may go to "Admission" then "Reconcile Home Medications" tabs to review and/or act upon these items.     The home medication list has been updated by the Pharmacy department.   Please read ALL comments highlighted in yellow.   Please address this information as you see fit.    Feel free to contact us if you have any questions or require assistance.      Medications listed below were obtained from: Patient/family and Analytic software- [employer] [facility]   Medication Sig    acetaminophen (TYLENOL) 500 MG tablet   Take 2 tablets (1,000 mg total) by mouth every 8 (eight) hours as needed for pain.    apixaban (ELIQUIS) 5 mg Tab   Take 5 mg by mouth once daily.    aspirin (ECOTRIN) 81 MG EC tablet   Take 81 mg by mouth once daily.    biotin 1 mg tablet   Take 1,000 mcg by mouth once daily.    budesonide-glycopyr-formoterol (BREZTRI AEROSPHERE) 160-9-4.8 mcg/actuation HFAA   Inhale 2 puffs into the lungs 2 (two) times daily.    fluticasone propionate (FLONASE) 50 mcg/actuation nasal spray   Instill 1 spray by each nostril route daily as needed for congestion.    furosemide (LASIX) 40 MG tablet   Take 20 mg by mouth 2 (two) times a day.    inhalation spacing device   Use as directed for inhalation.    losartan (COZAAR) 50 MG tablet   Take 1 tablet (50 mg total) by mouth once daily.    metoprolol succinate (TOPROL-XL) 100 MG 24 hr tablet   Take 1 tablet (100 mg total) by mouth once daily.    vitamin D (VITAMIN D3) 1000 units Tab   Take 2 tablets (2,000 Units total) by mouth once daily.    rosuvastatin (CRESTOR) 5 MG tablet   Take 1 tablet (5 mg total) by mouth once daily. (Patient not taking: Reported on 3/1/2024)           Dominic Gant  EXT [id number]                .          "

## 2024-03-02 NOTE — SUBJECTIVE & OBJECTIVE
Interval History: LATONIA NIELSON, on home 2L NC. Insufficient urine output overnight, will continue IV lasix at increased dose this afternoon. Echo pending. Replace potassium.     Review of Systems   Constitutional:  Positive for activity change. Negative for chills, diaphoresis, fatigue and fever.   HENT:  Negative for congestion, rhinorrhea and sore throat.    Respiratory:  Positive for cough and shortness of breath. Negative for chest tightness and wheezing.    Cardiovascular:  Positive for palpitations and leg swelling. Negative for chest pain.   Gastrointestinal:  Negative for abdominal distention, abdominal pain, blood in stool, constipation, diarrhea, nausea and vomiting.   Genitourinary:  Negative for difficulty urinating, dysuria, frequency, hematuria and urgency.   Musculoskeletal:  Negative for arthralgias, back pain and neck stiffness.   Neurological:  Negative for dizziness, tremors, seizures, syncope, weakness, light-headedness, numbness and headaches.   Psychiatric/Behavioral:  Negative for agitation, confusion and hallucinations.      Objective:     Vital Signs (Most Recent):  Temp: 97.9 °F (36.6 °C) (03/02/24 0756)  Pulse: 69 (03/02/24 0830)  Resp: 16 (03/02/24 0815)  BP: (!) 141/65 (03/02/24 0830)  SpO2: 96 % (03/02/24 0815) Vital Signs (24h Range):  Temp:  [97.8 °F (36.6 °C)-98.5 °F (36.9 °C)] 97.9 °F (36.6 °C)  Pulse:  [] 69  Resp:  [16-21] 16  SpO2:  [92 %-99 %] 96 %  BP: (109-206)/() 141/65     Weight: 78.9 kg (174 lb)  Body mass index is 29.87 kg/m².    Intake/Output Summary (Last 24 hours) at 3/2/2024 1036  Last data filed at 3/2/2024 0643  Gross per 24 hour   Intake 480 ml   Output 450 ml   Net 30 ml         Physical Exam  Vitals and nursing note reviewed.   Constitutional:       General: She is not in acute distress.     Appearance: She is well-developed. She is obese. She is not diaphoretic.      Interventions: Nasal cannula in place.   HENT:      Head: Normocephalic and  atraumatic.      Right Ear: External ear normal.      Left Ear: External ear normal.      Nose: Nose normal. No congestion.      Mouth/Throat:      Pharynx: Oropharynx is clear.   Eyes:      General: No scleral icterus.     Extraocular Movements: Extraocular movements intact.   Neck:      Vascular: JVD present.   Cardiovascular:      Rate and Rhythm: Normal rate.      Pulses: Normal pulses.      Heart sounds: No murmur heard.  Pulmonary:      Effort: Pulmonary effort is normal. No respiratory distress.      Breath sounds: Examination of the right-lower field reveals decreased breath sounds. Examination of the left-lower field reveals decreased breath sounds. Decreased breath sounds present. No wheezing or rales.   Abdominal:      General: Bowel sounds are normal. There is no distension.      Palpations: Abdomen is soft.      Tenderness: There is no abdominal tenderness. There is no guarding or rebound.   Musculoskeletal:      Cervical back: Normal range of motion.      Right lower leg: No edema.      Left lower leg: No edema.   Skin:     General: Skin is warm and dry.      Capillary Refill: Capillary refill takes less than 2 seconds.   Neurological:      General: No focal deficit present.      Mental Status: She is alert and oriented to person, place, and time. Mental status is at baseline.   Psychiatric:         Mood and Affect: Mood normal.         Behavior: Behavior normal.         Thought Content: Thought content normal.             Significant Labs: All pertinent labs within the past 24 hours have been reviewed.  CBC:   Recent Labs   Lab 03/01/24  1148 03/02/24  0912   WBC 10.35 7.53   HGB 15.2 12.5   HCT 45.7 40.4    183     CMP:   Recent Labs   Lab 03/01/24  1314 03/02/24  0912    145   K 3.8 3.2*    111*   CO2 24 21*    69*   BUN 18 18   CREATININE 1.2 1.0   CALCIUM 10.3 8.5*   PROT 6.4  --    ALBUMIN 3.0*  --    BILITOT 0.9  --    ALKPHOS 101  --    AST 31  --    ALT 19  --     ANIONGAP 9 13       Significant Imaging: I have reviewed all pertinent imaging results/findings within the past 24 hours.

## 2024-03-03 VITALS
DIASTOLIC BLOOD PRESSURE: 82 MMHG | HEART RATE: 66 BPM | WEIGHT: 174 LBS | RESPIRATION RATE: 18 BRPM | OXYGEN SATURATION: 95 % | TEMPERATURE: 98 F | HEIGHT: 64 IN | SYSTOLIC BLOOD PRESSURE: 124 MMHG | BODY MASS INDEX: 29.71 KG/M2

## 2024-03-03 LAB
ANION GAP SERPL CALC-SCNC: 10 MMOL/L (ref 8–16)
ANION GAP SERPL CALC-SCNC: 9 MMOL/L (ref 8–16)
BASOPHILS # BLD AUTO: 0.07 K/UL (ref 0–0.2)
BASOPHILS NFR BLD: 0.9 % (ref 0–1.9)
BUN SERPL-MCNC: 26 MG/DL (ref 8–23)
BUN SERPL-MCNC: 26 MG/DL (ref 8–23)
CALCIUM SERPL-MCNC: 9.7 MG/DL (ref 8.7–10.5)
CALCIUM SERPL-MCNC: 9.8 MG/DL (ref 8.7–10.5)
CHLORIDE SERPL-SCNC: 106 MMOL/L (ref 95–110)
CHLORIDE SERPL-SCNC: 106 MMOL/L (ref 95–110)
CO2 SERPL-SCNC: 26 MMOL/L (ref 23–29)
CO2 SERPL-SCNC: 26 MMOL/L (ref 23–29)
CREAT SERPL-MCNC: 1.3 MG/DL (ref 0.5–1.4)
CREAT SERPL-MCNC: 1.4 MG/DL (ref 0.5–1.4)
DIFFERENTIAL METHOD BLD: NORMAL
EOSINOPHIL # BLD AUTO: 0.3 K/UL (ref 0–0.5)
EOSINOPHIL NFR BLD: 3.4 % (ref 0–8)
ERYTHROCYTE [DISTWIDTH] IN BLOOD BY AUTOMATED COUNT: 13.4 % (ref 11.5–14.5)
EST. GFR  (NO RACE VARIABLE): 38 ML/MIN/1.73 M^2
EST. GFR  (NO RACE VARIABLE): 41.6 ML/MIN/1.73 M^2
GLUCOSE SERPL-MCNC: 87 MG/DL (ref 70–110)
GLUCOSE SERPL-MCNC: 87 MG/DL (ref 70–110)
HCT VFR BLD AUTO: 40.9 % (ref 37–48.5)
HGB BLD-MCNC: 13.3 G/DL (ref 12–16)
IMM GRANULOCYTES # BLD AUTO: 0.03 K/UL (ref 0–0.04)
IMM GRANULOCYTES NFR BLD AUTO: 0.4 % (ref 0–0.5)
LYMPHOCYTES # BLD AUTO: 1.9 K/UL (ref 1–4.8)
LYMPHOCYTES NFR BLD: 23.6 % (ref 18–48)
MAGNESIUM SERPL-MCNC: 2 MG/DL (ref 1.6–2.6)
MCH RBC QN AUTO: 30.8 PG (ref 27–31)
MCHC RBC AUTO-ENTMCNC: 32.5 G/DL (ref 32–36)
MCV RBC AUTO: 95 FL (ref 82–98)
MONOCYTES # BLD AUTO: 1 K/UL (ref 0.3–1)
MONOCYTES NFR BLD: 12.7 % (ref 4–15)
NEUTROPHILS # BLD AUTO: 4.8 K/UL (ref 1.8–7.7)
NEUTROPHILS NFR BLD: 59 % (ref 38–73)
NRBC BLD-RTO: 0 /100 WBC
PLATELET # BLD AUTO: 194 K/UL (ref 150–450)
PMV BLD AUTO: 10 FL (ref 9.2–12.9)
POTASSIUM SERPL-SCNC: 3.5 MMOL/L (ref 3.5–5.1)
POTASSIUM SERPL-SCNC: 3.9 MMOL/L (ref 3.5–5.1)
RBC # BLD AUTO: 4.32 M/UL (ref 4–5.4)
SODIUM SERPL-SCNC: 141 MMOL/L (ref 136–145)
SODIUM SERPL-SCNC: 142 MMOL/L (ref 136–145)
WBC # BLD AUTO: 8.14 K/UL (ref 3.9–12.7)

## 2024-03-03 PROCEDURE — 25000242 PHARM REV CODE 250 ALT 637 W/ HCPCS: Performed by: PHYSICIAN ASSISTANT

## 2024-03-03 PROCEDURE — 93005 ELECTROCARDIOGRAM TRACING: CPT

## 2024-03-03 PROCEDURE — 36415 COLL VENOUS BLD VENIPUNCTURE: CPT | Mod: XB | Performed by: STUDENT IN AN ORGANIZED HEALTH CARE EDUCATION/TRAINING PROGRAM

## 2024-03-03 PROCEDURE — 36415 COLL VENOUS BLD VENIPUNCTURE: CPT | Performed by: PHYSICIAN ASSISTANT

## 2024-03-03 PROCEDURE — 63600175 PHARM REV CODE 636 W HCPCS

## 2024-03-03 PROCEDURE — 96376 TX/PRO/DX INJ SAME DRUG ADON: CPT

## 2024-03-03 PROCEDURE — 25000003 PHARM REV CODE 250: Performed by: PHYSICIAN ASSISTANT

## 2024-03-03 PROCEDURE — 93010 ELECTROCARDIOGRAM REPORT: CPT | Mod: ,,, | Performed by: INTERNAL MEDICINE

## 2024-03-03 PROCEDURE — 80048 BASIC METABOLIC PNL TOTAL CA: CPT | Performed by: PHYSICIAN ASSISTANT

## 2024-03-03 PROCEDURE — 94761 N-INVAS EAR/PLS OXIMETRY MLT: CPT

## 2024-03-03 PROCEDURE — G0378 HOSPITAL OBSERVATION PER HR: HCPCS

## 2024-03-03 PROCEDURE — 25000003 PHARM REV CODE 250

## 2024-03-03 PROCEDURE — 27000221 HC OXYGEN, UP TO 24 HOURS

## 2024-03-03 PROCEDURE — 83735 ASSAY OF MAGNESIUM: CPT | Performed by: STUDENT IN AN ORGANIZED HEALTH CARE EDUCATION/TRAINING PROGRAM

## 2024-03-03 PROCEDURE — 80048 BASIC METABOLIC PNL TOTAL CA: CPT | Mod: 91 | Performed by: STUDENT IN AN ORGANIZED HEALTH CARE EDUCATION/TRAINING PROGRAM

## 2024-03-03 PROCEDURE — 85025 COMPLETE CBC W/AUTO DIFF WBC: CPT

## 2024-03-03 PROCEDURE — 94640 AIRWAY INHALATION TREATMENT: CPT

## 2024-03-03 RX ORDER — CEFDINIR 300 MG/1
300 CAPSULE ORAL 2 TIMES DAILY
Qty: 8 CAPSULE | Refills: 0 | Status: SHIPPED | OUTPATIENT
Start: 2024-03-03 | End: 2024-03-07

## 2024-03-03 RX ORDER — POTASSIUM CHLORIDE 750 MG/1
30 CAPSULE, EXTENDED RELEASE ORAL ONCE
Status: COMPLETED | OUTPATIENT
Start: 2024-03-03 | End: 2024-03-03

## 2024-03-03 RX ORDER — FUROSEMIDE 40 MG/1
40 TABLET ORAL DAILY
Qty: 90 TABLET | Refills: 1 | Status: ON HOLD | OUTPATIENT
Start: 2024-03-03 | End: 2024-05-11 | Stop reason: HOSPADM

## 2024-03-03 RX ADMIN — APIXABAN 5 MG: 5 TABLET, FILM COATED ORAL at 08:03

## 2024-03-03 RX ADMIN — CEFTRIAXONE 1 G: 1 INJECTION, POWDER, FOR SOLUTION INTRAMUSCULAR; INTRAVENOUS at 08:03

## 2024-03-03 RX ADMIN — CHOLECALCIFEROL TAB 25 MCG (1000 UNIT) 2000 UNITS: 25 TAB at 08:03

## 2024-03-03 RX ADMIN — FUROSEMIDE 80 MG: 10 INJECTION, SOLUTION INTRAVENOUS at 08:03

## 2024-03-03 RX ADMIN — LOSARTAN POTASSIUM 50 MG: 50 TABLET, FILM COATED ORAL at 08:03

## 2024-03-03 RX ADMIN — ATORVASTATIN CALCIUM 20 MG: 20 TABLET, FILM COATED ORAL at 08:03

## 2024-03-03 RX ADMIN — METOPROLOL SUCCINATE 100 MG: 100 TABLET, EXTENDED RELEASE ORAL at 08:03

## 2024-03-03 RX ADMIN — FLUTICASONE FUROATE AND VILANTEROL TRIFENATATE 1 PUFF: 100; 25 POWDER RESPIRATORY (INHALATION) at 08:03

## 2024-03-03 RX ADMIN — POTASSIUM CHLORIDE 30 MEQ: 10 CAPSULE, COATED, EXTENDED RELEASE ORAL at 08:03

## 2024-03-03 RX ADMIN — ASPIRIN 81 MG: 81 TABLET, COATED ORAL at 08:03

## 2024-03-03 NOTE — PLAN OF CARE
Problem: Adult Inpatient Plan of Care  Goal: Plan of Care Review  Outcome: Ongoing, Progressing  Goal: Patient-Specific Goal (Individualized)  Outcome: Ongoing, Progressing  Goal: Absence of Hospital-Acquired Illness or Injury  Outcome: Ongoing, Progressing  Goal: Optimal Comfort and Wellbeing  Outcome: Ongoing, Progressing  Goal: Readiness for Transition of Care  Outcome: Ongoing, Progressing     Problem: Fall Injury Risk  Goal: Absence of Fall and Fall-Related Injury  Outcome: Ongoing, Progressing     Problem: Heart Failure Comorbidity  Goal: Maintenance of Heart Failure Symptom Control  Outcome: Ongoing, Progressing     Problem: Hypertension Comorbidity  Goal: Blood Pressure in Desired Range  Outcome: Ongoing, Progressing     Problem: Functional Ability Impaired COPD (Chronic Obstructive Pulmonary Disease)  Goal: Optimal Level of Functional [place]  Outcome: Ongoing, Progressing     Problem: Adjustment to Illness COPD (Chronic Obstructive Pulmonary Disease)  Goal: Optimal Chronic Illness Coping  Outcome: Ongoing, Progressing     Problem: Infection COPD (Chronic Obstructive Pulmonary Disease)  Goal: Absence of Infection Signs and Symptoms  Outcome: Ongoing, Progressing     Problem: Oral Intake Inadequate COPD (Chronic Obstructive Pulmonary Disease)  Goal: Improved Nutrition Intake  Outcome: Ongoing, Progressing     Problem: Respiratory Compromise COPD (Chronic Obstructive Pulmonary Disease)  Goal: Effective Oxygenation and Ventilation  Outcome: Ongoing, Progressing     Problem: ARDS (Acute Respiratory Distress Syndrome)  Goal: Effective Oxygenation  Outcome: Ongoing, Progressing     Problem: Airway Clearance Ineffective  Goal: Effective Airway Clearance  Outcome: Ongoing, Progressing     Problem: Adjustment to Illness (Chronic Kidney Disease)  Goal: Optimal Coping with Chronic Illness  Outcome: Ongoing, Progressing     Problem: Electrolyte Imbalance (Chronic Kidney Disease)  Goal: Electrolyte  Balance  Outcome: Ongoing, Progressing     Problem: Fluid Volume Excess (Chronic Kidney Disease)  Goal: Fluid Balance  Outcome: Ongoing, Progressing     Problem: Functional Decline (Chronic Kidney Disease)  Goal: Optimal Functional Ability  Outcome: Ongoing, Progressing     Problem: Hematologic Alteration (Chronic Kidney Disease)  Goal: Absence of Anemia Signs and Symptoms  Outcome: Ongoing, Progressing

## 2024-03-03 NOTE — PLAN OF CARE
[facility] 11H  Initial Discharge Assessment       Primary Care Provider: Celia Carlisle MD    Admission Diagnosis: CHF (congestive heart failure) [I50.9]  Dyspnea [R06.00]  SOB (shortness of breath) [R06.02]  Acute on chronic congestive heart failure, unspecified heart failure type [I50.9]    Admission Date: 3/1/2024  Expected Discharge Date: 3/4/2024    Transition of Care Barriers: None    Payor: [employer] MEDICARE ADVANTAGE / Plan: [employer] MCARE ADV / Product Type: Medicare Advantage /     Extended Emergency Contact Information  Primary Emergency Contact: JennEsther  Mobile Phone: [phone]  Relation: Daughter  Secondary Emergency Contact: Marina Gruber  Address: [address] [place] [address]  Home Phone: [phone]  Mobile Phone: [phone]  Relation: Daughter    Discharge Plan A: Home  Discharge Plan B: [facility] - [facility] [address] [facility]  [address] [facility] [address]  Phone: [phone] Fax: [phone]    [facility]  [address] [facility] [address]  [place] [address]  Phone: [phone] Fax: [phone]    [facility]  [address]  [facility] [address]  Phone: [phone] Fax: [phone]    CM at bedside to discuss discharge planning assessment.   Patient lives alone in a one-story home.   Independent with ADL and uses home oxygen.   Explained CM services during patient's stay in hospital.   My Health packet discussed and left with patient.    Initial Assessment (most recent)       Adult Discharge Assessment - 03/03/24 1338          Discharge Assessment    Assessment Type Discharge Planning Assessment     Confirmed/corrected address, phone number and insurance Yes     Confirmed Demographics Correct on Facesheet     Source of Information patient;family     Reason For Admission CHF     People in Home alone     Facility Arrived  From: home     Do you expect to return to your current living situation? Yes     Do you have help at home or someone to help you manage your care at home? No     Prior to hospitilization cognitive status: Alert/Oriented     Current cognitive status: Alert/Oriented     Walking or Climbing Stairs Difficulty no     Dressing/Bathing Difficulty no     Home Layout Able to live on 1st floor     Equipment Currently Used at Home oxygen     Readmission within 30 days? No     Do you currently have service(s) that help you manage your care at home? No     Do you take prescription medications? Yes     Do you have prescription coverage? Yes     Coverage [employer] MEDICARE ADVANTAGE - [facility] MC[facility] ADV -     Do you have any problems affording any of your prescribed medications? No     Is the patient taking medications as prescribed? yes     How do you get to doctors appointments? family or friend will provide     Are you on dialysis? No     Do you take coumadin? No     Discharge Plan A Home     Discharge Plan B Home Health     DME Needed Upon Discharge  --   TBD    Discharge Plan discussed with: Patient;Adult children     Transition of Care Barriers None        Physical Activity    On average, how many days per week do you engage in moderate to strenuous exercise (like a brisk walk)? 0 days     On average, how many minutes do you engage in exercise at this level? 0 min        Financial Resource Strain    How hard is it for you to pay for the very basics like food, housing, medical care, and heating? Not very hard        Housing Stability    In the last 12 months, was there a time when you were not able to pay the mortgage or rent on time? No     In the last 12 months, how many places have you lived? 1     In the last 12 months, was there a time when you did not have a steady place to sleep or slept in a shelter (including now)? No        Transportation Needs    In the past 12 months, has lack of transportation  kept you from medical appointments or from getting medications? No     In the past 12 months, has lack of transportation kept you from meetings, work, or from getting things needed for daily living? No        Food Insecurity    Within the past 12 months, you worried that your food would run out before you got the money to buy more. Never true     Within the past 12 months, the food you bought just didn't last and you didn't have money to get more. Never true        Stress    Do you feel stress - tense, restless, nervous, or anxious, or unable to sleep at night because your mind is troubled all the time - these days? Not at all        Social Connections    In a typical week, how many times do you talk on the phone with family, friends, or neighbors? More than three times a week     How often do you get together with friends or relatives? Once a week     How often do you attend Congregational or Orthodox services? More than 4 times per year     Do you belong to any clubs or organizations such as Congregational groups, unions, fraternal or athletic groups, or school groups? No     How often do you attend meetings of the clubs or organizations you belong to? Never     Are you , , , , never , or living with a partner?         Alcohol Use    Q1: How often do you have a drink containing alcohol? Never     Q2: How many drinks containing alcohol do you have on a typical day when you are drinking? Patient does not drink     Q3: How often do you have six or more drinks on one occasion? Never

## 2024-03-03 NOTE — PLAN OF CARE
Problem: Adult Inpatient Plan of Care  Goal: Plan of Care Review  3/3/2024 1425 by Shazia Harris RN  Outcome: Met  3/3/2024 1424 by Shazia Harris RN  Outcome: Ongoing, Progressing  Goal: Patient-Specific Goal (Individualized)  3/3/2024 1425 by Shazia Harris, RN  Outcome: Met  3/3/2024 1424 by Shazia Harris, RN  Outcome: Ongoing, Progressing  Goal: Absence of Hospital-Acquired Illness or Injury  3/3/2024 1425 by Shazia Harris, RN  Outcome: Met  3/3/2024 1424 by Shazia Harris RN  Outcome: Ongoing, Progressing  Goal: Optimal Comfort and Wellbeing  3/3/2024 1425 by Shazia Harris RN  Outcome: Met  3/3/2024 1424 by Shazia Harris RN  Outcome: Ongoing, Progressing  Goal: Readiness for Transition of Care  3/3/2024 1425 by Shazia Harris RN  Outcome: Met  3/3/2024 1424 by Shazia Harris RN  Outcome: Ongoing, Progressing     Problem: Fall Injury Risk  Goal: Absence of Fall and Fall-Related Injury  3/3/2024 1425 by Shazia Harris, RN  Outcome: Met  3/3/2024 1424 by Shazia Harris RN  Outcome: Ongoing, Progressing     Problem: Heart Failure Comorbidity  Goal: Maintenance of Heart Failure Symptom Control  3/3/2024 1425 by Shazia Harris, RN  Outcome: Met  3/3/2024 1424 by Shazia Harris RN  Outcome: Ongoing, Progressing     Problem: Hypertension Comorbidity  Goal: Blood Pressure in Desired Range  3/3/2024 1425 by Shazia Harris, RN  Outcome: Met  3/3/2024 1424 by Shazia Harris RN  Outcome: Ongoing, Progressing     Problem: Adjustment to Illness COPD (Chronic Obstructive Pulmonary Disease)  Goal: Optimal Chronic Illness Coping  3/3/2024 1425 by Shazia Harris, RN  Outcome: Met  3/3/2024 1424 by Shazia Harris RN  Outcome: Ongoing, Progressing     Problem: Functional Ability Impaired COPD (Chronic Obstructive Pulmonary Disease)  Goal: Optimal Level of Functional [place]  3/3/2024 1425 by Shazia Harris, RN  Outcome: Met  3/3/2024 1424 by Shazia Harris, RN  Outcome:  Ongoing, Progressing     Problem: Infection COPD (Chronic Obstructive Pulmonary Disease)  Goal: Absence of Infection Signs and Symptoms  3/3/2024 1425 by Shazia Harris RN  Outcome: Met  3/3/2024 1424 by Shazia Harris RN  Outcome: Ongoing, Progressing     Problem: Oral Intake Inadequate COPD (Chronic Obstructive Pulmonary Disease)  Goal: Improved Nutrition Intake  3/3/2024 1425 by Shazia Harris, RN  Outcome: Met  3/3/2024 1424 by Shazia Harris RN  Outcome: Ongoing, Progressing     Problem: Respiratory Compromise COPD (Chronic Obstructive Pulmonary Disease)  Goal: Effective Oxygenation and Ventilation  3/3/2024 1425 by Shazia Harris RN  Outcome: Met  3/3/2024 1424 by Shazia Harris RN  Outcome: Ongoing, Progressing     Problem: ARDS (Acute Respiratory Distress Syndrome)  Goal: Effective Oxygenation  3/3/2024 1425 by Shazia Harris RN  Outcome: Met  3/3/2024 1424 by Shazia Harris RN  Outcome: Ongoing, Progressing     Problem: Airway Clearance Ineffective  Goal: Effective Airway Clearance  3/3/2024 1425 by Shazia Harris, RN  Outcome: Met  3/3/2024 1424 by Shazia Harris RN  Outcome: Ongoing, Progressing     Problem: Adjustment to Illness (Chronic Kidney Disease)  Goal: Optimal Coping with Chronic Illness  3/3/2024 1425 by Shazia Harris, RN  Outcome: Met  3/3/2024 1424 by Shazia Harris RN  Outcome: Ongoing, Progressing     Problem: Electrolyte Imbalance (Chronic Kidney Disease)  Goal: Electrolyte Balance  3/3/2024 1425 by Shazia Harris, RN  Outcome: Met  3/3/2024 1424 by Shazia Harris RN  Outcome: Ongoing, Progressing     Problem: Fluid Volume Excess (Chronic Kidney Disease)  Goal: Fluid Balance  3/3/2024 1425 by Shazia Harris RN  Outcome: Met  3/3/2024 1424 by Shazia Harris RN  Outcome: Ongoing, Progressing     Problem: Functional Decline (Chronic Kidney Disease)  Goal: Optimal Functional Ability  3/3/2024 1425 by Shazia Harris RN  Outcome: Met  3/3/2024 1424  by Shazia Harris RN  Outcome: Ongoing, Progressing     Problem: Hematologic Alteration (Chronic Kidney Disease)  Goal: Absence of Anemia Signs and Symptoms  3/3/2024 1425 by Shazia Harris, RN  Outcome: Met  3/3/2024 1424 by Shazia Harris RN  Outcome: Ongoing, Progressing     Problem: Oral Intake Inadequate (Chronic Kidney Disease)  Goal: Optimal Oral Intake  3/3/2024 1425 by Shazia Harris RN  Outcome: Met  3/3/2024 1424 by Shazia Harris RN  Outcome: Ongoing, Progressing     Problem: Pain (Chronic Kidney Disease)  Goal: Acceptable Pain Control  3/3/2024 1425 by Shazia Harris RN  Outcome: Met  3/3/2024 1424 by Shazia Harris RN  Outcome: Ongoing, Progressing     Problem: Renal Function Impairment (Chronic Kidney Disease)  Goal: Minimize Renal Failure Effects  3/3/2024 1425 by Shazia Harris RN  Outcome: Met  3/3/2024 1424 by Shazia Harris RN  Outcome: Ongoing, Progressing     Problem: Dysrhythmia  Goal: Normalized Cardiac Rhythm  3/3/2024 1425 by Shazia Harris RN  Outcome: Met  3/3/2024 1424 by Shazia Harris RN  Outcome: Ongoing, Progressing     Problem: Infection  Goal: Absence of Infection Signs and Symptoms  3/3/2024 1425 by Shazia Harris, RN  Outcome: Met  3/3/2024 1424 by Shazia Harris RN  Outcome: Ongoing, Progressing     Problem: Impaired Wound Healing  Goal: Optimal Wound Healing  3/3/2024 1425 by Shazia Harris RN  Outcome: Met  3/3/2024 1424 by Shazia Harris RN  Outcome: Ongoing, Progressing

## 2024-03-03 NOTE — DISCHARGE SUMMARY
"[facility] - Observation [address] [place] [address] Medicine  Discharge Summary      Patient Name: Misa Barajas  MRN: [id number]  ARTHUR: [id number]  Patient Class: OP- Observation  Admission Date: 3/1/2024  Hospital Length of Stay: 0 days  Discharge Date and Time:  03/03/2024 3:31 PM  Attending Physician: Kaitlynn Clay MD   Discharging Provider: Ángel Carlson PA-C  Primary Care Provider: Celia Carlisle MD  Hospital Medicine Team: [facility] MED Y Ángel Carlson PA-C  Primary Care Team: [facility] HOS[facility] MED Y    HPI:   Ms. Misa Barajas is a 78-year-old female with a past medical history of COPD and HTN/ HLD complicated by HFpEF (60% on 5/4/22) on 2 L home O2, and pAF (eliquis) now presenting with shortness of breath and leg swelling. Patient reports that for the past 6 weeks she has experienced worsening shortness of breath with ambulation which acutely worsened this morning. Patient denies any recent fevers, viral type illnesses, or chest pain. Endorses dry cough. Patient reports taking albuterol inhaler without improvement. She also endorses orthopnea and b/l lower extremity swelling. Reports that she stopped taking her Lasix and only took half a pill "as needed" within the last 2 weeks because it was making her lightheaded. She is noncompliant w/ low sodium diet. Otherwise, Pt reports taking her home medications as prescribed and had taken them prior to arrival to ED. Further denies any weakness, fatigue, abdominal pain, n/v/d, dysuria or change in BM.    In ED, Pt hypertensive 156/109, sats 95% on 2L NC, . No leukocytosis. Electrolytes wnl. BNP 1,261, trop 0.02. PH 7.36, PCO2 50.7, PO2 15, HCO3 28.7. CXR w/ mild cardiomegaly, blunted costophrenic angles b/l, and mild edema. Given lasix 80 mg IV x1.     * No surgery found *      Hospital Course:   Misa Barajas is placed in  Observation for management of acute on chronic heart failure exacerbation. At home patient has been noncompliant with lasix dosing. BNP " elevated on admission, CXR with mild edema. Started on IV lasix. Monitoring UOP. Echo with EF 60-65%, normal systolic function, G2DD, CVP 3. Sufficient UOP with IV diuresis. Patient symptomatically improving. Patient will start lasix 40mg daily at discharge and start cefdinir to complete antibiotic course for UTI. She will follow up with PCP and Cardiology. Instructed to track blood pressures three times daily and bring to follow up Cardiology appointment. Home Health ordered. Return precautions provided. Patient medically ready for discharge. Plan of care discussed with patient, patient agreeable to plan, and all questions answered.      Physical Exam    General: NAD, appears stated age.  Neuro: AAOx4. Motor, sensory, and strength grossly intact bilaterally.  HEENT: EOMI. No JVD appreciated. Home O2 in place.  CVS: RRR, no m/r/g; S1/S2 auscultated. Capillary refill < 2 sec.  Respiratory: LCTAB. No labored breathing or accessory muscle use appreciated.   Abdominal: Normal bowel sounds in all 4 quadrants. NTND, soft to palpation.    Extremities: pulses full, equal, and regular over all 4 extremities.      Goals of Care Treatment Preferences:  Code Status: Full Code      Consults:   Consults (From admission, onward)          Status Ordering Provider     Inpatient consult to Social Work/Case Management  Once        Provider:  (Not yet assigned)    Completed [facility] QUARLES     Inpatient consult to Registered Dietitian/Nutritionist  Once        Provider:  (Not yet assigned)    Completed [facility] QUARLES            No new Assessment & Plan notes have been filed under this hospital service since the last note was generated.  Service: Hospital Medicine    Final Active Diagnoses:    Diagnosis Date Noted POA    PRINCIPAL PROBLEM:  Acute on chronic diastolic congestive heart failure [I50.33] 03/01/2024 Yes    Hypokalemia [E87.6] 03/02/2024 No    Hypomagnesemia [E83.42] 03/02/2024 No    Chronic respiratory failure  with hypoxia [J96.11] 06/26/2021 Yes    Centrilobular emphysema [J43.2] 06/22/2021 Yes    Paroxysmal atrial fibrillation [I48.0] 06/07/2021 Yes    CKD (chronic kidney disease) stage 3, GFR 30-59 ml/min [N18.30] 02/14/2018 Yes    Hyperlipidemia [E78.5] 11/20/2016 Yes    Essential hypertension [I10] 05/28/2015 Yes      Problems Resolved During this Admission:       Discharged Condition: stable    Disposition: Home or Self Care    Follow Up:   Follow-up Information       Celia Carlisle MD Follow up in 1 week(s).    Specialty: Internal Medicine  Contact information:  [address] [facility]  [place] [address] [phone]                           Patient Instructions:      Ambulatory referral/consult to Cardiology   Standing Status: Future   Referral Priority: Urgent Referral Type: Consultation   Referral Reason: Specialty Services Required   Requested Specialty: Cardiology   Number of Visits Requested: 1     Diet Cardiac     Notify your health care provider if you experience any of the following:  persistent nausea and vomiting or diarrhea     Notify your health care provider if you experience any of the following:  severe uncontrolled pain     Notify your health care provider if you experience any of the following:  increased confusion or weakness     Notify your health care provider if you experience any of the following:  persistent dizziness, light-headedness, or visual disturbances     Notify your health care provider if you experience any of the following:  difficulty breathing or increased cough     Activity as tolerated       Significant Diagnostic Studies: N/A    Pending Diagnostic Studies:       None           Medications:  Reconciled Home Medications:      Medication List        START taking these medications      cefdinir 300 MG capsule  Commonly known as: OMNICEF  Take 1 capsule (300 mg total) by mouth 2 (two) times daily. for 4 days            CHANGE how you take these medications      furosemide 40 MG  tablet  Commonly known as: LASIX  Take 1 tablet (40 mg total) by mouth once daily.  What changed:   when to take this  reasons to take this            CONTINUE taking these medications      acetaminophen 500 MG tablet  Commonly known as: TYLENOL  Take 2 tablets (1,000 mg total) by mouth every 8 (eight) hours as needed for Pain.     aspirin 81 MG EC tablet  Commonly known as: ECOTRIN  Take 81 mg by mouth once daily.     biotin 1 mg tablet  Take 1,000 mcg by mouth once daily.     BREZTRI AEROSPHERE 160-9-4.8 mcg/actuation Hfaa  Generic drug: budesonide-glycopyr-formoterol  Inhale 2 puffs into the lungs 2 (two) times daily.     ELIQUIS 5 mg Tab  Generic drug: apixaban  Take 1 tablet (5 mg total) by mouth 2 (two) times a day.     fluticasone propionate 50 mcg/actuation nasal spray  Commonly known as: FLONASE  1 spray by Each Nostril route daily as needed (congestion).     inhalation spacing device  Use as directed for inhalation.     losartan 50 MG tablet  Commonly known as: COZAAR  Take 1 tablet (50 mg total) by mouth once daily.     metoprolol succinate 100 MG 24 hr tablet  Commonly known as: TOPROL-XL  Take 1 tablet (100 mg total) by mouth once daily.     vitamin D 1000 units Tab  Commonly known as: VITAMIN D3  Take 2 tablets (2,000 Units total) by mouth once daily.            ASK your doctor about these medications      rosuvastatin 5 MG tablet  Commonly known as: CRESTOR  Take 1 tablet (5 mg total) by mouth once daily.              Indwelling Lines/Drains at time of discharge:   Lines/Drains/Airways       None                   Time spent on the discharge of patient: 45 minutes         Ángel Carlson PA-C  Department of Hospital Medicine  [facility] - Observation 11[facility]   yes

## 2024-03-03 NOTE — PLAN OF CARE
[facility]      HOME HEALTH ORDERS  FACE TO FACE ENCOUNTER    Patient Name: Misa Barajas  YOB: 1945    PCP: Celia Carlisle MD   PCP Address: [address] [place] [address] / [id number]  PCP Phone Number: [phone]  PCP Fax: [phone]    Encounter Date: 3/1/24    Admit to Home Health    Diagnoses:  Active Hospital Problems    Diagnosis  POA    *Acute on chronic diastolic congestive heart failure [I50.33]  Yes    Hypokalemia [E87.6]  No    Hypomagnesemia [E83.42]  No    Chronic respiratory failure with hypoxia [J96.11]  Yes    Centrilobular emphysema [J43.2]  Yes    Paroxysmal atrial fibrillation [I48.0]  Yes    CKD (chronic kidney disease) stage 3, GFR 30-59 ml/min [N18.30]  Yes    Hyperlipidemia [E78.5]  Yes    Essential hypertension [I10]  Yes      Resolved Hospital Problems   No resolved problems to display.       Follow Up Appointments:  No future appointments.    Allergies:  Review of patient's allergies indicates:   Allergen Reactions    Adhesive Rash     Pt states she removed a LATEX bandaid and the skin beneath was swollen and red. No other latex causes a reaction.       Medications: Review discharge medications with patient and family and provide education.    Current Facility-Administered Medications   Medication Dose Route Frequency Provider Last Rate Last Admin    acetaminophen tablet 650 mg  650 mg Oral Q6H PRN Claribel Ley NP   650 mg at 03/01/24 2103    apixaban tablet 5 mg  5 mg Oral BID Gaviota Viera PA-C   5 mg at 03/03/24 0857    aspirin EC tablet 81 mg  81 mg Oral Daily Gaviota Viera PA-C   81 mg at 03/03/24 0856    atorvastatin tablet 20 mg  20 mg Oral Daily Gaviota Viera PA-C   20 mg at 03/03/24 0857    cefTRIAXone (Rocephin) 1 g in dextrose 5 % in water (D5W) 100 mL IVPB (MB+)  1 g Intravenous Q24H Ángel Carlson PA-C   Stopped at 03/03/24 0925    fluticasone furoate-vilanteroL 100-25 mcg/dose diskus inhaler 1 puff   1 puff Inhalation Daily Gaviota Viera PA-C   1 puff at 03/03/24 0857    furosemide injection 80 mg  80 mg Intravenous BID Ángel Carlson PA-C   80 mg at 03/03/24 0856    hydrALAZINE injection 10 mg  10 mg Intravenous Q8H PRN nÁgel Carlson PA-C   10 mg at 03/01/24 1811    losartan tablet 50 mg  50 mg Oral Daily Gaviota Viera PA-C   50 mg at 03/03/24 0857    metoprolol succinate (TOPROL-XL) 24 hr tablet 100 mg  100 mg Oral Daily Gaviota Virea PA-C   100 mg at 03/03/24 0857    ondansetron disintegrating tablet 4 mg  4 mg Oral Q8H PRN Gaviota Viera PA-C        ondansetron injection 4 mg  4 mg Intravenous Q8H PRN Gaviota Viera PA-C        polyethylene glycol packet 17 g  17 g Oral Daily PRN Gaviota Viera PA-C        sodium chloride 0.9% flush 10 mL  10 mL Intravenous PRN Gaviota Viera PA-C        vitamin D 1000 units tablet 2,000 Units  2,000 Units Oral Daily Gaviota Viera PA-C   2,000 Units at 03/03/24 0857     Current Discharge Medication List        START taking these medications    Details   cefdinir (OMNICEF) 300 MG capsule Take 1 capsule (300 mg total) by mouth 2 (two) times daily. for 4 days  Qty: 8 capsule, Refills: 0           CONTINUE these medications which have CHANGED    Details   furosemide (LASIX) 40 MG tablet Take 1 tablet (40 mg total) by mouth once daily.  Qty: 90 tablet, Refills: 1    Comments: md increased dosage 06/24  Associated Diagnoses: Chronic diastolic heart failure           CONTINUE these medications which have NOT CHANGED    Details   acetaminophen (TYLENOL) 500 MG tablet Take 2 tablets (1,000 mg total) by mouth every 8 (eight) hours as needed for Pain.  Qty: 42 tablet, Refills: 0      apixaban (ELIQUIS) 5 mg Tab Take 1 tablet (5 mg total) by mouth 2 (two) times a day.  Qty: 180 tablet, Refills: 3      aspirin (ECOTRIN) 81 MG EC tablet Take 81 mg by mouth once daily.      biotin 1 mg tablet Take 1,000 mcg by mouth  once daily.      budesonide-glycopyr-formoterol (BREZTRI AEROSPHERE) 160-9-4.8 mcg/actuation HFAA Inhale 2 puffs into the lungs 2 (two) times daily.  Qty: 32.1 g, Refills: 3    Associated Diagnoses: Asthma-COPD overlap syndrome      fluticasone propionate (FLONASE) 50 mcg/actuation nasal spray 1 spray by Each Nostril route daily as needed (congestion).      inhalation spacing device Use as directed for inhalation.  Qty: 1 Device, Refills: 0      losartan (COZAAR) 50 MG tablet Take 1 tablet (50 mg total) by mouth once daily.  Qty: 90 tablet, Refills: 1    Comments: .  Associated Diagnoses: Chronic diastolic heart failure      metoprolol succinate (TOPROL-XL) 100 MG 24 hr tablet Take 1 tablet (100 mg total) by mouth once daily.  Qty: 90 tablet, Refills: 3    Comments: .  Associated Diagnoses: Essential hypertension      vitamin D (VITAMIN D3) 1000 units Tab Take 2 tablets (2,000 Units total) by mouth once daily.  Qty: 180 tablet, Refills: 3    Associated Diagnoses: Vitamin D insufficiency      rosuvastatin (CRESTOR) 5 MG tablet Take 1 tablet (5 mg total) by mouth once daily.  Qty: 90 tablet, Refills: 3    Associated Diagnoses: Hyperlipidemia, unspecified hyperlipidemia type               I have seen and examined this patient within the last 30 days. My clinical findings that support the need for the home health skilled services and home bound status are the following:no   Weakness/numbness causing balance and gait disturbance due to Heart Failure making it taxing to leave home.     Diet:   cardiac diet    Labs:  Per agency.    Referrals/ Consults  Physical Therapy to evaluate and treat. Evaluate for home safety and equipment needs; Establish/upgrade home exercise program. Perform / instruct on therapeutic exercises, gait training, transfer training, and Range of Motion.  Occupational Therapy to evaluate and treat. Evaluate home environment for safety and equipment needs. Perform/Instruct on transfers, ADL training,  ROM, and therapeutic exercises.  Aide to provide assistance with personal care, ADLs, and vital signs.    Activities:   activity as tolerated    Nursing:   Agency to admit patient within 24 hours of hospital discharge unless specified on physician order or at patient request    SN to complete comprehensive assessment including routine vital signs. Instruct on disease process and s/s of complications to report to MD. Review/verify medication list sent home with the patient at time of discharge  and instruct patient/caregiver as needed. Frequency may be adjusted depending on start of care date.     Skilled nurse to perform up to 3 visits PRN for symptoms related to diagnosis    Notify MD if SBP > 160 or < 90; DBP > 90 or < 50; HR > 120 or < 50; Temp > 101; O2 < 88%.    Ok to schedule additional visits based on staff availability and patient request on consecutive days within the home health episode.    When multiple disciplines ordered:    Start of Care occurs on Sunday - Wednesday schedule remaining discipline evaluations as ordered on separate consecutive days following the start of care.    Thursday SOC -schedule subsequent evaluations Friday and Monday the following week.     Friday - Saturday SOC - schedule subsequent discipline evaluations on consecutive days starting Monday of the following week.    For all post-discharge communication and subsequent orders please contact patient's primary care physician. If unable to reach primary care physician or do not receive response within 30 minutes, please contact [phone] for clinical staff order clarification    Miscellaneous   Heart Failure:      SN to instruct on the following:    Instruct on the definition of CHF.   Instruct on the signs/sympoms of CHF to be reported.   Instruct on and monitor daily weights.   Instruct on factors that cause exacerbation.   Instruct on action, dose, schedule, and side effects of medications.   Instruct on diet as  prescribed.   Instruct on activity allowed.   Instruct on life-style modifications for life long management of CHF   SN to assess compliance with daily weights, diet, medications, fluid retention,    safety precautions, activities permitted and life-style modifications.   Additional 1-2 SN visits per week as needed for signs and symptoms     of CHF exacerbation.      For Weight Gain > 2-3 lbs in 1 day or 4-6 lbs over 1 week notify PCP.    Home Health Aide:  Nursing Three times weekly, Physical Therapy Three times weekly, Occupational Therapy Three times weekly, and Home Health Aide Three times weekly    Wound Care Orders  no    I certify that this patient is confined to her home and needs intermittent skilled nursing care, physical therapy, and occupational therapy.

## 2024-03-04 ENCOUNTER — EPISODE CHANGES (OUTPATIENT)
Dept: CARDIOLOGY | Facility: CLINIC | Age: 79
End: 2024-03-04

## 2024-03-04 ENCOUNTER — TELEPHONE (OUTPATIENT)
Dept: CARDIOLOGY | Facility: CLINIC | Age: 79
End: 2024-03-04
Payer: MEDICARE

## 2024-03-04 ENCOUNTER — PATIENT OUTREACH (OUTPATIENT)
Dept: ADMINISTRATIVE | Facility: CLINIC | Age: 79
End: 2024-03-04
Payer: MEDICARE

## 2024-03-04 ENCOUNTER — TELEPHONE (OUTPATIENT)
Dept: ADMINISTRATIVE | Facility: CLINIC | Age: 79
End: 2024-03-04
Payer: MEDICARE

## 2024-03-04 LAB
BACTERIA UR CULT: ABNORMAL
OHS QRS DURATION: 114 MS
OHS QTC CALCULATION: 496 MS

## 2024-03-04 NOTE — TELEPHONE ENCOUNTER
[facility] ([facility]) Team notified of pt referral via [facility] (automated inbasket notification) .    Patient screened today 3-4-2024 by provider and RN for enrollment to program.      Pt was deemed not a candidate for enrollment at this time related to patient refused. PT states I do all of that already.    Pt will require additional follow up planning per primary team.     If pt status, diagnosis, or treatment plan changes , please place AMB referral to [facility] ([id number]) for [facility] enrollment re-evalution.

## 2024-03-04 NOTE — PROGRESS NOTES
C3 nurse spoke with Misa Barajas for a TCC post hospital discharge follow up call. The patient has a scheduled [facility] appointment with Candida Ye NP on 03/12/24 @ 0800.

## 2024-03-04 NOTE — PLAN OF CARE
3/4/2024 - 1:39 pm:   Home health referral sent in [facility] and accepted by [facility].   Patient will be visited on 3/5/2024.

## 2024-03-05 ENCOUNTER — TELEPHONE (OUTPATIENT)
Dept: INTERNAL MEDICINE | Facility: CLINIC | Age: 79
End: 2024-03-05
Payer: MEDICARE

## 2024-03-05 NOTE — TELEPHONE ENCOUNTER
----- Message from Christy Anand sent at 3/5/2024  3:09 PM CST -----  Type:  Needs Medical Advice    Who Called: [facility] member services  Would the patient rather a call back or a response via [facility]? call  Best Call Back Number:  medical management team  Additional Information: requesting orders for pt to have a meal

## 2024-03-06 DIAGNOSIS — I50.32 CHRONIC DIASTOLIC HEART FAILURE: ICD-10-CM

## 2024-03-06 RX ORDER — LOSARTAN POTASSIUM 50 MG/1
50 TABLET ORAL DAILY
Qty: 90 TABLET | Refills: 1 | Status: ON HOLD | OUTPATIENT
Start: 2024-03-06 | End: 2024-05-18

## 2024-03-06 NOTE — TELEPHONE ENCOUNTER
Care Due:                  Date            Visit Type   Department     Provider  --------------------------------------------------------------------------------                                EP -                              PRIMARY      [facility] INTERNAL  Last Visit: 08-      CARE ([facility])   MEDICINE       Celia Carlisle  Next Visit: None Scheduled  None         None Found                                                            Last  Test          Frequency    Reason                     Performed    Due Date  --------------------------------------------------------------------------------    Vitamin D...  12 months..  vitamin..................  02-   02-    [facility] Embedded Care Due Messages. Reference number: [id number].   3/06/2024 8:15:13 AM CST

## 2024-03-12 ENCOUNTER — PATIENT MESSAGE (OUTPATIENT)
Dept: PODIATRY | Facility: CLINIC | Age: 79
End: 2024-03-12
Payer: MEDICARE

## 2024-03-12 ENCOUNTER — OFFICE VISIT (OUTPATIENT)
Dept: HOME HEALTH SERVICES | Facility: CLINIC | Age: 79
End: 2024-03-12
Payer: MEDICARE

## 2024-03-12 DIAGNOSIS — I70.0 AORTIC ATHEROSCLEROSIS: ICD-10-CM

## 2024-03-12 DIAGNOSIS — I10 ESSENTIAL HYPERTENSION: ICD-10-CM

## 2024-03-12 DIAGNOSIS — G62.0 DRUG-INDUCED POLYNEUROPATHY: ICD-10-CM

## 2024-03-12 DIAGNOSIS — M46.99 UNSPECIFIED INFLAMMATORY SPONDYLOPATHY, MULTIPLE SITES IN SPINE: ICD-10-CM

## 2024-03-12 DIAGNOSIS — J96.11 CHRONIC RESPIRATORY FAILURE WITH HYPOXIA: Primary | ICD-10-CM

## 2024-03-12 DIAGNOSIS — N25.81 SECONDARY HYPERPARATHYROIDISM OF RENAL ORIGIN: ICD-10-CM

## 2024-03-12 DIAGNOSIS — N18.32 STAGE 3B CHRONIC KIDNEY DISEASE: ICD-10-CM

## 2024-03-12 DIAGNOSIS — D69.2 SENILE PURPURA: ICD-10-CM

## 2024-03-12 PROCEDURE — 1160F RVW MEDS BY RX/DR IN RCRD: CPT | Mod: CPTII,S$GLB,, | Performed by: NURSE PRACTITIONER

## 2024-03-12 PROCEDURE — 3078F DIAST BP <80 MM HG: CPT | Mod: CPTII,S$GLB,, | Performed by: NURSE PRACTITIONER

## 2024-03-12 PROCEDURE — 3074F SYST BP LT 130 MM HG: CPT | Mod: CPTII,S$GLB,, | Performed by: NURSE PRACTITIONER

## 2024-03-12 PROCEDURE — 99497 ADVNCD CARE PLAN 30 MIN: CPT | Mod: S$GLB,,, | Performed by: NURSE PRACTITIONER

## 2024-03-12 PROCEDURE — 99495 TRANSJ CARE MGMT MOD F2F 14D: CPT | Mod: S$GLB,,, | Performed by: NURSE PRACTITIONER

## 2024-03-12 PROCEDURE — 1159F MED LIST DOCD IN RCRD: CPT | Mod: CPTII,S$GLB,, | Performed by: NURSE PRACTITIONER

## 2024-03-13 VITALS
DIASTOLIC BLOOD PRESSURE: 67 MMHG | RESPIRATION RATE: 20 BRPM | SYSTOLIC BLOOD PRESSURE: 128 MMHG | TEMPERATURE: 98 F | HEART RATE: 69 BPM | OXYGEN SATURATION: 97 %

## 2024-03-13 PROBLEM — M46.99 UNSPECIFIED INFLAMMATORY SPONDYLOPATHY, MULTIPLE SITES IN SPINE: Status: ACTIVE | Noted: 2024-03-13

## 2024-03-13 NOTE — PATIENT INSTRUCTIONS
Instructions:  [facility] to schedule home follow-up visit with patient  as needed.  - Continue all medications, treatments and therapies as ordered.   - Follow all instructions, recommendations as discussed.  - Maintain Safety Precautions at all times.  - Attend all medical appointments as scheduled.  - For worsening symptoms: call Primary Care Physician or Nurse Practitioner.  - For emergencies, call 911 or immediately report to the nearest emergency room.  - Limit Risks of environmental exposure to coronavirus/COVID-19 as discussed including: social distancing, hand hygiene, and limiting departures from the home for necessities only.

## 2024-03-13 NOTE — ASSESSMENT & PLAN NOTE
- chronic, uncontrolled  now improving  - continue lasix  - continue home losartan 50mg daily and toprol-xl 100mg daily

## 2024-03-13 NOTE — ASSESSMENT & PLAN NOTE
- CKD 3  - avoid nephrotoxic meds, renal dosing  - monitor closely while diuresing   Continued Stay Note  [facility]     Patient Name: Elizabeth Oliveira  MRN: [id number]  Today's Date: 12/8/2021    Admit Date: 12/2/2021     Discharge Plan     Row Name 12/08/21 1726       Plan    Plan ongoing    Plan Comments Visit made to pt, pt's friend at the bedside. Telephone call to pt's niece Lara. Informed Lara this writer spoke with CLARENCE PARRA who stated met with the family and discussed comfort measures. Lara stated did meet with Ester and discussed comfort measures, stated will be at the hospital tomorrow morning and wants to meet againg to review what is all entailed with pt having comfort meausre. Will meet with Lara tomorrow. Please call [phone] if can be of further assistance.               Discharge Codes    No documentation.                     Belen Austin RN

## 2024-03-13 NOTE — ASSESSMENT & PLAN NOTE
- well controlled with current medications  - voices no side effects on medication  - advise patient to follow with renal

## 2024-03-13 NOTE — PROGRESS NOTES
"[facility]  Transitional Care Management (TCM) Home Visit    Encounter Provider: Cnadida RIDLEY Chairs   PCP: Celia Carlisle MD  Consult Requested By: No ref. provider found  Admit Date: 3/1/24   IP Discharge Date: 3/3/24  Hospital Length of Stay:RRHLOS@ days  Admission Date: 3/1/2024  Hospital Length of Stay: 0 days  Discharge Date and Time:  03/03/2024  Hospital Diagnosis: COPD/CHF.     HISTORY OF PRESENT ILLNESS      Patient ID: Misa Barajas is a 78 y.o. female was recently admitted to the hospital, this is their TCM encounter.    Hospital Course Synopsis:    HPI:   Ms. Misa Barajas is a 78-year-old female with a past medical history of COPD and HTN/ HLD complicated by HFpEF (60% on 5/4/22) on 2 L home O2, and pAF (eliquis) now presenting with shortness of breath and leg swelling. Patient reports that for the past 6 weeks she has experienced worsening shortness of breath with ambulation which acutely worsened this morning. Patient denies any recent fevers, viral type illnesses, or chest pain. Endorses dry cough. Patient reports taking albuterol inhaler without improvement. She also endorses orthopnea and b/l lower extremity swelling. Reports that she stopped taking her Lasix and only took half a pill "as needed" within the last 2 weeks because it was making her lightheaded. She is noncompliant w/ low sodium diet. Otherwise, Pt reports taking her home medications as prescribed and had taken them prior to arrival to ED. Further denies any weakness, fatigue, abdominal pain, n/v/d, dysuria or change in BM.     In ED, Pt hypertensive 156/109, sats 95% on 2L NC, . No leukocytosis. Electrolytes wnl. BNP 1,261, trop 0.02. PH 7.36, PCO2 50.7, PO2 15, HCO3 28.7. CXR w/ mild cardiomegaly, blunted costophrenic angles b/l, and mild edema. Given lasix 80 mg IV x1.         Hospital Course:   Misa Barajas is placed in  Observation for management of acute on chronic heart failure exacerbation. At home patient " has been noncompliant with lasix dosing. BNP elevated on admission, CXR with mild edema. Started on IV lasix. Monitoring UOP. Echo with EF 60-65%, normal systolic function, G2DD, CVP 3. Sufficient UOP with IV diuresis. Patient symptomatically improving. Patient will start lasix 40mg daily at discharge and start cefdinir to complete antibiotic course for UTI. She will follow up with PCP and Cardiology. Instructed to track blood pressures three times daily and bring to follow up Cardiology appointment. Home Health ordered. Return precautions provided. Patient medically ready for discharge. Plan of care discussed with patient, patient agreeable to plan, and all questions answered.      DECISION MAKING TODAY      Patient found home alone with her cat, ambulatory with no assistive device when she answered the door, She has supplemental oxygen in use via NC, she denies SOB on exam. She feels she is making good progress since her hospitalization.        VSS. Denies fever, chest pain, shortness of breath, nausea, vomiting, diarrhea. Risks of environmental exposure to coronavirus discussed including: social distancing, hand hygiene, and limiting departures from the home for necessities only.  Reports understanding and willingness to comply.  All hospital discharge orders reviewed and being followed, all medications reconciled and reviewed, patient and family verbalized understanding. No other needs identified at this time.     I initiated the process of advance care planning today and explained the importance of this process to the patient and family.  I introduced the concept of advance directives to the patient, as well. Then the patient received detailed information about the importance of designating a Health Care Power of  (HCPOA). She was also instructed to communicate with this person about his wishes for future healthcare, should he become sick and lose decision-making capacity. FULL CODE STATUS    I  Introduced [place] form with patient/family, explaining this is the patient's wishes, and this form will be uploaded into the [facility]'s [facility] Chart and the [place] Registry.     We spoke about ACP for 30 minutes.    Attestation: Screening criteria to assess the level of the patient's risk for infection with COVID-19 as recommended by the CDC at the time of the above documented home visit concluded appropriateness to proceed. Universal precautions were maintained at all times, including provider use of 60% alcohol gel hand  immediately prior to entry and upon departing the patient's home.    Assessment & Plan:  1. Chronic respiratory failure with hypoxia  Assessment & Plan:  Patient with Hypercapnic Respiratory failure which is Chronic.  she is on home oxygen at 2 LPM. Supplemental oxygen was provided and noted-  Signs/symptoms of respiratory failure include- respiratory distress. Contributing diagnoses includes - CHF, COPD, Obesity Hypoventilation, and Pleural effusion Labs and images were reviewed. Patient Has recent ABG, which has been reviewed. Will treat underlying causes and adjust management of respiratory failure: see CHF      2. Essential hypertension  Assessment & Plan:  - chronic, uncontrolled  now improving  - continue lasix  - continue home losartan 50mg daily and toprol-xl 100mg daily         3. Stage 3b chronic kidney disease  Assessment & Plan:  - CKD 3  - avoid nephrotoxic meds, renal dosing  - monitor closely while diuresing      4. 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         5. Secondary hyperparathyroidism of renal origin  Assessment & Plan:  - well controlled with current medications  - voices no side effects on medication  - advise patient to follow with renal         6. Senile purpura  Assessment & Plan:  The current medical regimen is effective;   continue present plan and medications.         7. Aortic atherosclerosis  Overview:  As seen on imaging dated 08/09/10    Assessment & Plan:  Continue Rosuvastatin      8. Drug-induced polyneuropathy  Assessment & Plan:  - patient voices to significant limitations due to this   - will continue to monitor              Medication List on Discharge:     Medication List            Accurate as of March 12, 2024 11:59 PM. If you have any questions, ask your nurse or doctor.                CHANGE how you take these medications      ELIQUIS 5 mg Tab  Generic drug: apixaban  Take 1 tablet (5 mg total) by mouth 2 (two) times a day.  What changed: when to take this            CONTINUE taking these medications      acetaminophen 500 MG tablet  Commonly known as: TYLENOL  Take 2 tablets (1,000 mg total) by mouth every 8 (eight) hours as needed for Pain.     aspirin 81 MG EC tablet  Commonly known as: ECOTRIN  Take 81 mg by mouth once daily.     biotin 1 mg tablet  Take 1,000 mcg by mouth once daily.     BREZTRI AEROSPHERE 160-9-4.8 mcg/actuation Hfaa  Generic drug: budesonide-glycopyr-formoterol  Inhale 2 puffs into the lungs 2 (two) times daily.     fluticasone propionate 50 mcg/actuation nasal spray  Commonly known as: FLONASE  1 spray by Each Nostril route daily as needed (congestion).     furosemide 40 MG tablet  Commonly known as: LASIX  Take 1 tablet (40 mg total) by mouth once daily.     inhalation spacing device  Use as directed for inhalation.     losartan 50 MG tablet  Commonly known as: COZAAR  Take 1 tablet (50 mg total) by mouth once daily.     metoprolol succinate 100 MG 24 hr tablet  Commonly known as: TOPROL-XL  Take 1 tablet (100 mg total) by mouth once daily.     rosuvastatin 5 MG tablet  Commonly known as: CRESTOR  Take 1 tablet (5 mg total) by mouth once daily.     vitamin D 1000 units Tab  Commonly known as: VITAMIN D3  Take 2 tablets (2,000 Units total) by mouth once daily.              Medication  Reconciliation:  Were medications changed on discharge? Yes  Were medications in the home? Yes  Is the patient taking the medications as directed? Yes  Does the patient understand the medications and changes? Yes  Does updated med list accurately reflects meds patient is currently taking? Yes    ENVIRONMENT OF CARE      Family and/or Caregiver present at visit?  No  Name of Caregiver: 0  History provided by: patient    Advance Care Planning   Advanced Care Planning Status:  Patient has had an ACP conversation  Living Will: No  Power of : No  [place]: No    Does Caregiver have HCPoA: No  Changes today: 0  Is patient hospice appropriate: No  (If needed, use PPS <30 or FAST score >7)  Was referral to hospice placed: No       Impression upon entering the home:  Physical Dwelling: single family home   Appearance of home environment: cleaniness: clean  Functional Status: minimal assistance  Mobility: ambulatory  Nutritional access: adequate intake and access  Home Health: No, and does not need it at this time   DME/Supplies: oxygen     Diagnostic tests reviewed/disposition: No diagnosic tests pending after this hospitalization.  Disease/illness education: CHF  Establishment or re-establishment of referral orders for community resources: No other necessary community resources.   Discussion with other health care providers: No discussion with other health care providers necessary.   Does patient have a PCP at [facility]? Yes   Repatriation plan with PCP? follow-up with PCP within 30d   Does patient have an ostomy (ileostomy, colostomy, suprapubic catheter, nephrostomy tube, tracheostomy, PEG tube, pleurex catheter, cholecystostomy, etc)? No  Were BPAs reviewed? Yes    Social History     Socioeconomic History    Marital status:    Tobacco Use    Smoking status: Former     Current packs/day: 0.00     Average packs/day: 1 pack/day for 50.0 years (50.0 ttl pk-yrs)     Types: Cigarettes     Start date: 1960     Quit date:  2009     Years since quittin.8    Smokeless tobacco: Never   Substance and Sexual Activity    Alcohol use: No    Drug use: No    Sexual activity: Not Currently     Partners: Male     Birth control/protection: Partner-Vasectomy     Social Determinants of Health     Financial Resource Strain: Low Risk  (3/3/2024)    Overall Financial Resource Strain (CARDIA)     Difficulty of Paying Living Expenses: Not very hard   Food Insecurity: No Food Insecurity (3/3/2024)    Hunger Vital Sign     Worried About Running Out of Food in the Last Year: Never true     Ran Out of Food in the Last Year: Never true   Transportation Needs: No Transportation Needs (3/3/2024)    PRAPARE - Transportation     Lack of Transportation (Medical): No     Lack of Transportation (Non-Medical): No   Physical Activity: Inactive (3/3/2024)    Exercise Vital Sign     Days of Exercise per Week: 0 days     Minutes of Exercise per Session: 0 min   Stress: No Stress Concern Present (3/3/2024)    [employer] [place] [employer] - Occupational Stress Questionnaire     Feeling of Stress : Not at all   Social Connections: Moderately Isolated (3/3/2024)    Social Connection and Isolation Panel [NHANES]     Frequency of Communication with Friends and Family: More than three times a week     Frequency of Social Gatherings with Friends and Family: Once a week     Attends [employer] Services: More than 4 times per year     Active Member of Clubs or Organizations: No     Attends Club or Organization Meetings: Never     Marital Status:    Housing Stability: Low Risk  (3/3/2024)    Housing Stability Vital Sign     Unable to Pay for Housing in the Last Year: No     Number of Places Lived in the Last Year: 1     Unstable Housing in the Last Year: No       OBJECTIVE:     Vital Signs:  Vitals:    24 1314   BP: 128/67   Pulse: 69   Resp: 20   Temp: 97.6 °F (36.4 °C)       Review of Systems    Physical Exam:  Physical Exam    INSTRUCTIONS FOR  PATIENT:     Scheduled Follow-up, Appts Reviewed with Modifications if Needed: Yes  Future Appointments   Date Time Provider Department Center   4/23/2024  3:00 PM Carlos Davis MD [facility] CARDIO [facility]       Signature: Candida Ye, NP    Transition of Care Visit:  I have reviewed and updated the history and problem list.  I have reconciled the medication list.  I have discussed the hospitalization and current medical issues, prognosis and plans with the patient/family.

## 2024-03-18 ENCOUNTER — TELEPHONE (OUTPATIENT)
Dept: CARDIOLOGY | Facility: CLINIC | Age: 79
End: 2024-03-18
Payer: MEDICARE

## 2024-03-18 DIAGNOSIS — I48.0 PAROXYSMAL ATRIAL FIBRILLATION: ICD-10-CM

## 2024-03-18 DIAGNOSIS — I50.32 CHRONIC DIASTOLIC HEART FAILURE: Primary | ICD-10-CM

## 2024-03-18 DIAGNOSIS — I10 ESSENTIAL HYPERTENSION: ICD-10-CM

## 2024-03-18 NOTE — TELEPHONE ENCOUNTER
----- Message from Joy Lema RN sent at 3/18/2024  1:51 PM CDT -----  Regarding: FW: Fluctuating Pulse  Spoke w. daughter Esther and PT Emiliana. See message below. Pt d/c fr hospital on 3/3 w. last EKG on 3/3 w. HR 120s and PVCs. According to daughter pt is currently asymptomatic except for tired w.  HR 50s-70s now as per pulse ox. She stated that her HR has been 30s-50s. I told her to bring her to ER if symptomatic (symptoms reviewed) , check her BP frequently to make sure it is not low, and scheduled her for 3/22 + EKG w. Dr Hines. Agata verbalized understanding. and agreed to date/time of appointment(s).    FYI    Seen last w. dr Davis and dr Albarado    ----- Message -----  From: Panfilo Lane  Sent: 3/18/2024   1:21 PM CDT  To: Joy Lema, RN  Subject: Fluctuating Pulse                                Pt Home Health Care Nurse called,     Reporting pt pulse fluctuating,  on pulse reading, she thinks she may be in afib.  Pt complaining about heart racing this morning.     Contact @ [phone]    Thanks

## 2024-03-22 ENCOUNTER — HOSPITAL ENCOUNTER (OUTPATIENT)
Dept: CARDIOLOGY | Facility: CLINIC | Age: 79
Discharge: HOME | End: 2024-03-22
Payer: MEDICARE

## 2024-03-22 ENCOUNTER — OFFICE VISIT (OUTPATIENT)
Dept: CARDIOLOGY | Facility: CLINIC | Age: 79
End: 2024-03-22
Payer: MEDICARE

## 2024-03-22 VITALS
HEART RATE: 61 BPM | WEIGHT: 167 LBS | SYSTOLIC BLOOD PRESSURE: 128 MMHG | DIASTOLIC BLOOD PRESSURE: 61 MMHG | HEIGHT: 64 IN | BODY MASS INDEX: 28.51 KG/M2 | OXYGEN SATURATION: 95 %

## 2024-03-22 DIAGNOSIS — I50.32 CHRONIC DIASTOLIC HEART FAILURE: ICD-10-CM

## 2024-03-22 DIAGNOSIS — I10 ESSENTIAL HYPERTENSION: ICD-10-CM

## 2024-03-22 DIAGNOSIS — I48.0 PAROXYSMAL ATRIAL FIBRILLATION: ICD-10-CM

## 2024-03-22 DIAGNOSIS — R42 LIGHTHEADEDNESS: ICD-10-CM

## 2024-03-22 DIAGNOSIS — I50.9 ACUTE ON CHRONIC CONGESTIVE HEART FAILURE, UNSPECIFIED HEART FAILURE TYPE: ICD-10-CM

## 2024-03-22 DIAGNOSIS — I48.0 PAROXYSMAL ATRIAL FIBRILLATION: Primary | ICD-10-CM

## 2024-03-22 LAB
OHS QRS DURATION: 152 MS
OHS QTC CALCULATION: 460 MS

## 2024-03-22 PROCEDURE — 93005 ELECTROCARDIOGRAM TRACING: CPT | Mod: S$GLB,,, | Performed by: INTERNAL MEDICINE

## 2024-03-22 PROCEDURE — 3074F SYST BP LT 130 MM HG: CPT | Mod: CPTII,GC,S$GLB, | Performed by: INTERNAL MEDICINE

## 2024-03-22 PROCEDURE — 3288F FALL RISK ASSESSMENT DOCD: CPT | Mod: CPTII,GC,S$GLB, | Performed by: INTERNAL MEDICINE

## 2024-03-22 PROCEDURE — 1126F AMNT PAIN NOTED NONE PRSNT: CPT | Mod: CPTII,GC,S$GLB, | Performed by: INTERNAL MEDICINE

## 2024-03-22 PROCEDURE — 1159F MED LIST DOCD IN RCRD: CPT | Mod: CPTII,GC,S$GLB, | Performed by: INTERNAL MEDICINE

## 2024-03-22 PROCEDURE — 1101F PT FALLS ASSESS-DOCD LE1/YR: CPT | Mod: CPTII,GC,S$GLB, | Performed by: INTERNAL MEDICINE

## 2024-03-22 PROCEDURE — 99999 PR PBB SHADOW E&M-EST. PATIENT-LVL V: CPT | Mod: PBBFAC,GC,, | Performed by: INTERNAL MEDICINE

## 2024-03-22 PROCEDURE — 3078F DIAST BP <80 MM HG: CPT | Mod: CPTII,GC,S$GLB, | Performed by: INTERNAL MEDICINE

## 2024-03-22 PROCEDURE — 93010 ELECTROCARDIOGRAM REPORT: CPT | Mod: S$GLB,,, | Performed by: INTERNAL MEDICINE

## 2024-03-22 PROCEDURE — 99214 OFFICE O/P EST MOD 30 MIN: CPT | Mod: 25,GC,S$GLB, | Performed by: INTERNAL MEDICINE

## 2024-03-22 NOTE — PATIENT INSTRUCTIONS
Start taking jardiance 10 mg daily when approved    Get BMP (basic metabolic panel) with home health one week after starting     Wear heart monitor for 30 days    Referral placed to heart rhythm doctors (atrial fibrillation specialists)

## 2024-03-22 NOTE — PROGRESS NOTES
PCP - Celia Carlisle MD  Subjective:     Misa Barajas is a 78 y.o. female with PMH of COPD, HFpEF (60% on 22), pAF (eliquis), HTN, HLD presenting to clinic for follow up. She was last seen by Dr. Davis 2023 at which time she was doing well, her blood pressure was elevated and losartan was increased. She was hospitalized for decompensated HFpEF earlier this month after stopping her lasix for a few weeks. She states that since October she has had episodes of nausea and lightheadedness and subsequently discontinued her lasix 40 qd. She would take 20mg Bid, 20mg daily, or sometimes no lasix as she felt it maybe contributing to her lightheadedness. Patient was hospitalized in the first week of March for CHF and was discharged after a few days.     Since discharge she has been doing well overall. She feels improved compared to hospitalization but notes persistent bilateral lower extremity swelling. Still having some orthopnea.    ---------------  Cardiac Studies ---------------  TTE 3/2024:     Left Ventricle: The left ventricle is normal in size. Normal wall thickness. Normal wall motion. There is normal systolic function with a visually estimated ejection fraction of 60 - 65%. Grade II diastolic dysfunction.    Right Ventricle: Normal right ventricular cavity size. Wall thickness is normal. Right ventricle wall motion  is normal. Systolic function is normal.    Pulmonary Artery: The estimated pulmonary artery systolic pressure is 37 mmHg.    IVC/SVC: Normal venous pressure at 3 mmHg.    EKG 3/22/24    NSR, LAD, RBBB, LAFB, 1st Degree AV, LVH    History:     Social History     Tobacco Use    Smoking status: Former     Current packs/day: 0.00     Average packs/day: 1 pack/day for 50.0 years (50.0 ttl pk-yrs)     Types: Cigarettes     Start date:      Quit date: 2009     Years since quittin.8    Smokeless tobacco: Never   Substance Use Topics    Alcohol use: No     Family History    Problem Relation Age of Onset    Bone cancer Mother     Breast cancer Mother     Arthritis Mother         Breast Cancer    Breast cancer Sister     Cancer Father         Asbestos cancer    No Known Problems Brother     Fibroids Daughter     Crohn's disease Daughter     No Known Problems Daughter     Arthritis Sister         Breast Cancer    Anesthesia problems Neg Hx     Glaucoma Neg Hx        Meds:     Review of patient's allergies indicates:   Allergen Reactions    Adhesive Rash     Pt states she removed a LATEX bandaid and the skin beneath was swollen and red. No other latex causes a reaction.       Current Outpatient Medications:     acetaminophen (TYLENOL) 500 MG tablet, Take 2 tablets (1,000 mg total) by mouth every 8 (eight) hours as needed for Pain., Disp: 42 tablet, Rfl: 0    apixaban (ELIQUIS) 5 mg Tab, Take 1 tablet (5 mg total) by mouth 2 (two) times a day. (Patient taking differently: Take 5 mg by mouth once daily.), Disp: 180 tablet, Rfl: 3    aspirin (ECOTRIN) 81 MG EC tablet, Take 81 mg by mouth once daily., Disp: , Rfl:     biotin 1 mg tablet, Take 1,000 mcg by mouth once daily., Disp: , Rfl:     budesonide-glycopyr-formoterol (BREZTRI AEROSPHERE) 160-9-4.8 mcg/actuation HFAA, Inhale 2 puffs into the lungs 2 (two) times daily., Disp: 32.1 g, Rfl: 3    fluticasone propionate (FLONASE) 50 mcg/actuation nasal spray, 1 spray by Each Nostril route daily as needed (congestion)., Disp: , Rfl:     furosemide (LASIX) 40 MG tablet, Take 1 tablet (40 mg total) by mouth once daily., Disp: 90 tablet, Rfl: 1    inhalation spacing device, Use as directed for inhalation., Disp: 1 Device, Rfl: 0    losartan (COZAAR) 50 MG tablet, Take 1 tablet (50 mg total) by mouth once daily., Disp: 90 tablet, Rfl: 1    metoprolol succinate (TOPROL-XL) 100 MG 24 hr tablet, Take 1 tablet (100 mg total) by mouth once daily., Disp: 90 tablet, Rfl: 3    rosuvastatin (CRESTOR) 5 MG tablet, Take 1 tablet (5 mg total) by mouth once  "daily., Disp: 90 tablet, Rfl: 3    vitamin D (VITAMIN D3) 1000 units Tab, Take 2 tablets (2,000 Units total) by mouth once daily., Disp: 180 tablet, Rfl: 3    empagliflozin (JARDIANCE) 10 mg tablet, Take 1 tablet (10 mg total) by mouth once daily., Disp: 90 tablet, Rfl: 3    10 point ROS performed and negative except as stated in HPI     Objective:   /61   Pulse 61   Ht 5' 4" (1.626 m)   Wt 75.8 kg (167 lb)   SpO2 95%   BMI 28.67 kg/m²     Physical Exam:   Constitutional: no acute distress  HEENT: NCAT, EOMI, no scleral icterus  Cardiovascular: Regular rate and rhythm, no murmurs appreciated. 2+ carotid, radial, DP pulses bilaterally    Pulmonary: Faint basilar crackles    Abdomen: nontender, non-distended   Neuro: alert and oriented, no focal deficits  Extremities: warm, bilateral lower extremity pitting edema   MSK: no deformities  Integument: intact, no rashes       Labs:     Lab Results   Component Value Date     03/03/2024    K 3.9 03/03/2024     03/03/2024    CO2 26 03/03/2024    BUN 26 (H) 03/03/2024    CREATININE 1.4 03/03/2024    ANIONGAP 10 03/03/2024     Lab Results   Component Value Date    HGBA1C 5.4 03/01/2024     Lab Results   Component Value Date    BNP 1,261 (H) 03/01/2024     (H) 05/12/2022     (H) 06/25/2021       Lab Results   Component Value Date    WBC 8.14 03/03/2024    HGB 13.3 03/03/2024    HCT 40.9 03/03/2024     03/03/2024    GRAN 4.8 03/03/2024    GRAN 59.0 03/03/2024     Lab Results   Component Value Date    CHOL 148 08/21/2023    HDL 66 08/21/2023    LDLCALC 67.0 08/21/2023    TRIG 75 08/21/2023       Lab Results   Component Value Date     03/03/2024    K 3.9 03/03/2024     03/03/2024    CO2 26 03/03/2024    BUN 26 (H) 03/03/2024    CREATININE 1.4 03/03/2024    ANIONGAP 10 03/03/2024     Lab Results   Component Value Date    HGBA1C 5.4 03/01/2024     Lab Results   Component Value Date    BNP 1,261 (H) 03/01/2024     (H) " 05/12/2022     (H) 06/25/2021    Lab Results   Component Value Date    WBC 8.14 03/03/2024    HGB 13.3 03/03/2024    HCT 40.9 03/03/2024     03/03/2024    GRAN 4.8 03/03/2024    GRAN 59.0 03/03/2024     Lab Results   Component Value Date    CHOL 148 08/21/2023    HDL 66 08/21/2023    LDLCALC 67.0 08/21/2023    TRIG 75 08/21/2023            Assessment     1. Paroxysmal atrial fibrillation    2. Lightheadedness        Plan:   78 y.o. female with PMH of COPD, HFpEF (60% on 5/4/22), pAF (eliquis), HTN, HLD presenting to clinic for follow up.      #HFpEF:  Seems volume up today. Reports good response to PO diuretics at home. Non ischemic in etiology  - continue lasix 40mg qD (started at discharge), metoprolol succ 100mg qD, losartan to 50mg qD. Will add jardiance 10mg qd.   - repeat labs in 1 week  - RTC in 2 weeks     #pAFib:  CV 6, HB 1  - AC with eliquis 5mg BID  - continue rate control with metoprolol succ 100mg qD  - Will refer to EP  - Event monitor      #HTN:  BP in clinic today was  128/61  - Continue losartan to 50mg qD at last visit  - continue metoprolol succinate 100qd, and lasix 40mg qD  - keep a BP diary for next visit     #HLD:  Last lipid panel in 6/2021: chol 140, tri 128, HDL 65, LDL 66  - continue rosuvastatin 5mg qD        Derek Hines MD  [facility]  Cardiovascular Disease, PGY-VI

## 2024-04-04 ENCOUNTER — TELEPHONE (OUTPATIENT)
Dept: ELECTROPHYSIOLOGY | Facility: CLINIC | Age: 79
End: 2024-04-04
Payer: MEDICARE

## 2024-04-04 DIAGNOSIS — I48.0 PAROXYSMAL ATRIAL FIBRILLATION: Primary | ICD-10-CM

## 2024-04-05 ENCOUNTER — CLINICAL SUPPORT (OUTPATIENT)
Dept: CARDIOLOGY | Facility: HOSPITAL | Age: 79
End: 2024-04-05
Attending: INTERNAL MEDICINE
Payer: MEDICARE

## 2024-04-05 DIAGNOSIS — I48.0 PAROXYSMAL ATRIAL FIBRILLATION: ICD-10-CM

## 2024-04-05 DIAGNOSIS — R42 LIGHTHEADEDNESS: ICD-10-CM

## 2024-04-05 PROCEDURE — 93272 ECG/REVIEW INTERPRET ONLY: CPT | Mod: ,,, | Performed by: INTERNAL MEDICINE

## 2024-04-05 PROCEDURE — 93271 ECG/MONITORING AND ANALYSIS: CPT

## 2024-04-10 DIAGNOSIS — R53.81 PHYSICAL DEBILITY: Primary | ICD-10-CM

## 2024-04-10 PROBLEM — I50.33 ACUTE ON CHRONIC DIASTOLIC CONGESTIVE HEART FAILURE: Status: RESOLVED | Noted: 2024-03-01 | Resolved: 2024-04-10

## 2024-04-12 ENCOUNTER — EXTERNAL HOME HEALTH (OUTPATIENT)
Dept: HOME HEALTH SERVICES | Facility: HOSPITAL | Age: 79
End: 2024-04-12
Payer: MEDICARE

## 2024-04-18 ENCOUNTER — PATIENT OUTREACH (OUTPATIENT)
Dept: FAMILY MEDICINE | Facility: CLINIC | Age: 79
End: 2024-04-18
Payer: MEDICARE

## 2024-04-18 ENCOUNTER — TELEPHONE (OUTPATIENT)
Dept: FAMILY MEDICINE | Facility: CLINIC | Age: 79
End: 2024-04-18
Payer: MEDICARE

## 2024-04-22 ENCOUNTER — DOCUMENT SCAN (OUTPATIENT)
Dept: HOME HEALTH SERVICES | Facility: HOSPITAL | Age: 79
End: 2024-04-22
Payer: MEDICARE

## 2024-05-02 ENCOUNTER — TELEPHONE (OUTPATIENT)
Dept: CARDIOLOGY | Facility: CLINIC | Age: 79
End: 2024-05-02
Payer: MEDICARE

## 2024-05-04 PROCEDURE — G0179 MD RECERTIFICATION HHA PT: HCPCS | Mod: ,,, | Performed by: INTERNAL MEDICINE

## 2024-05-07 ENCOUNTER — OFFICE VISIT (OUTPATIENT)
Dept: CARDIOLOGY | Facility: CLINIC | Age: 79
End: 2024-05-07
Payer: MEDICARE

## 2024-05-07 ENCOUNTER — TELEPHONE (OUTPATIENT)
Dept: CARDIOLOGY | Facility: CLINIC | Age: 79
End: 2024-05-07
Payer: MEDICARE

## 2024-05-07 ENCOUNTER — HOSPITAL ENCOUNTER (INPATIENT)
Facility: HOSPITAL | Age: 79
LOS: 14 days | Discharge: HOME HEALTH | DRG: 291 | End: 2024-05-22
Attending: STUDENT IN AN ORGANIZED HEALTH CARE EDUCATION/TRAINING PROGRAM | Admitting: HOSPITALIST
Payer: MEDICARE

## 2024-05-07 VITALS
BODY MASS INDEX: 31.41 KG/M2 | SYSTOLIC BLOOD PRESSURE: 102 MMHG | HEIGHT: 64 IN | HEART RATE: 92 BPM | WEIGHT: 184 LBS | DIASTOLIC BLOOD PRESSURE: 60 MMHG

## 2024-05-07 DIAGNOSIS — I83.893 VARICOSE VEINS OF BOTH LEGS WITH EDEMA: ICD-10-CM

## 2024-05-07 DIAGNOSIS — N10 ACUTE INTERSTITIAL NEPHRITIS: ICD-10-CM

## 2024-05-07 DIAGNOSIS — M79.89 LEG SWELLING: ICD-10-CM

## 2024-05-07 DIAGNOSIS — G62.0 DRUG-INDUCED POLYNEUROPATHY: ICD-10-CM

## 2024-05-07 DIAGNOSIS — I50.9 ACUTE ON CHRONIC CONGESTIVE HEART FAILURE: Primary | ICD-10-CM

## 2024-05-07 DIAGNOSIS — R07.9 CHEST PAIN: ICD-10-CM

## 2024-05-07 DIAGNOSIS — I48.92 ATRIAL FLUTTER, UNSPECIFIED TYPE: ICD-10-CM

## 2024-05-07 DIAGNOSIS — I50.32 CHRONIC DIASTOLIC HEART FAILURE: ICD-10-CM

## 2024-05-07 DIAGNOSIS — E83.41 HYPERMAGNESEMIA: ICD-10-CM

## 2024-05-07 DIAGNOSIS — E87.6 HYPOKALEMIA: ICD-10-CM

## 2024-05-07 DIAGNOSIS — E83.39 HYPOPHOSPHATEMIA: ICD-10-CM

## 2024-05-07 DIAGNOSIS — R79.89 ELEVATED TROPONIN: ICD-10-CM

## 2024-05-07 DIAGNOSIS — I50.33 ACUTE ON CHRONIC DIASTOLIC CHF (CONGESTIVE HEART FAILURE): ICD-10-CM

## 2024-05-07 DIAGNOSIS — J96.11 CHRONIC RESPIRATORY FAILURE WITH HYPOXIA: ICD-10-CM

## 2024-05-07 DIAGNOSIS — J44.89 ASTHMA-COPD OVERLAP SYNDROME: ICD-10-CM

## 2024-05-07 DIAGNOSIS — R06.02 SHORTNESS OF BREATH: ICD-10-CM

## 2024-05-07 DIAGNOSIS — N17.0 ACUTE RENAL FAILURE WITH ACUTE TUBULAR NECROSIS SUPERIMPOSED ON STAGE 3B CHRONIC KIDNEY DISEASE: ICD-10-CM

## 2024-05-07 DIAGNOSIS — M46.99 UNSPECIFIED INFLAMMATORY SPONDYLOPATHY, MULTIPLE SITES IN SPINE: ICD-10-CM

## 2024-05-07 DIAGNOSIS — J43.2 CENTRILOBULAR EMPHYSEMA: ICD-10-CM

## 2024-05-07 DIAGNOSIS — M47.819 ARTHRITIS OF FACET JOINTS AT MULTIPLE VERTEBRAL LEVELS: ICD-10-CM

## 2024-05-07 DIAGNOSIS — I10 ESSENTIAL HYPERTENSION: ICD-10-CM

## 2024-05-07 DIAGNOSIS — I50.33 ACUTE ON CHRONIC DIASTOLIC CHF (CONGESTIVE HEART FAILURE): Primary | ICD-10-CM

## 2024-05-07 DIAGNOSIS — I87.2 VENOUS STASIS DERMATITIS OF BOTH LOWER EXTREMITIES: ICD-10-CM

## 2024-05-07 DIAGNOSIS — N18.32 STAGE 3B CHRONIC KIDNEY DISEASE: ICD-10-CM

## 2024-05-07 DIAGNOSIS — I47.20 V-TACH: ICD-10-CM

## 2024-05-07 DIAGNOSIS — I87.2 VENOUS INSUFFICIENCY OF BOTH LOWER EXTREMITIES: ICD-10-CM

## 2024-05-07 DIAGNOSIS — R33.9 URINARY RETENTION: ICD-10-CM

## 2024-05-07 DIAGNOSIS — N13.30 HYDROURETERONEPHROSIS: ICD-10-CM

## 2024-05-07 DIAGNOSIS — D69.2 SENILE PURPURA: ICD-10-CM

## 2024-05-07 DIAGNOSIS — E83.42 HYPOMAGNESEMIA: ICD-10-CM

## 2024-05-07 DIAGNOSIS — N20.1 RIGHT URETERAL STONE: ICD-10-CM

## 2024-05-07 DIAGNOSIS — R10.13 EPIGASTRIC ABDOMINAL PAIN: ICD-10-CM

## 2024-05-07 DIAGNOSIS — N18.32 ACUTE RENAL FAILURE WITH ACUTE TUBULAR NECROSIS SUPERIMPOSED ON STAGE 3B CHRONIC KIDNEY DISEASE: ICD-10-CM

## 2024-05-07 DIAGNOSIS — Z79.01 CHRONIC ANTICOAGULATION: ICD-10-CM

## 2024-05-07 DIAGNOSIS — I89.0 LYMPHEDEMA OF BOTH LOWER EXTREMITIES: ICD-10-CM

## 2024-05-07 LAB
ALBUMIN SERPL BCP-MCNC: 3 G/DL (ref 3.5–5.2)
ALP SERPL-CCNC: 98 U/L (ref 55–135)
ALT SERPL W/O P-5'-P-CCNC: 18 U/L (ref 10–44)
ANION GAP SERPL CALC-SCNC: 10 MMOL/L (ref 8–16)
AST SERPL-CCNC: 29 U/L (ref 10–40)
BASOPHILS # BLD AUTO: 0.05 K/UL (ref 0–0.2)
BASOPHILS NFR BLD: 0.6 % (ref 0–1.9)
BILIRUB SERPL-MCNC: 0.8 MG/DL (ref 0.1–1)
BNP SERPL-MCNC: 1165 PG/ML (ref 0–99)
BUN SERPL-MCNC: 26 MG/DL (ref 8–23)
CALCIUM SERPL-MCNC: 10.3 MG/DL (ref 8.7–10.5)
CHLORIDE SERPL-SCNC: 100 MMOL/L (ref 95–110)
CO2 SERPL-SCNC: 26 MMOL/L (ref 23–29)
CREAT SERPL-MCNC: 1.8 MG/DL (ref 0.5–1.4)
DIFFERENTIAL METHOD BLD: ABNORMAL
EOSINOPHIL # BLD AUTO: 0.2 K/UL (ref 0–0.5)
EOSINOPHIL NFR BLD: 2.2 % (ref 0–8)
ERYTHROCYTE [DISTWIDTH] IN BLOOD BY AUTOMATED COUNT: 14.2 % (ref 11.5–14.5)
EST. GFR  (NO RACE VARIABLE): 28.1 ML/MIN/1.73 M^2
GLUCOSE SERPL-MCNC: 96 MG/DL (ref 70–110)
HCT VFR BLD AUTO: 41.3 % (ref 37–48.5)
HGB BLD-MCNC: 13.2 G/DL (ref 12–16)
IMM GRANULOCYTES # BLD AUTO: 0.04 K/UL (ref 0–0.04)
IMM GRANULOCYTES NFR BLD AUTO: 0.5 % (ref 0–0.5)
LACTATE SERPL-SCNC: 1.2 MMOL/L (ref 0.5–2.2)
LYMPHOCYTES # BLD AUTO: 1.5 K/UL (ref 1–4.8)
LYMPHOCYTES NFR BLD: 17.5 % (ref 18–48)
MCH RBC QN AUTO: 30 PG (ref 27–31)
MCHC RBC AUTO-ENTMCNC: 32 G/DL (ref 32–36)
MCV RBC AUTO: 94 FL (ref 82–98)
MONOCYTES # BLD AUTO: 0.9 K/UL (ref 0.3–1)
MONOCYTES NFR BLD: 10.2 % (ref 4–15)
NEUTROPHILS # BLD AUTO: 5.9 K/UL (ref 1.8–7.7)
NEUTROPHILS NFR BLD: 69 % (ref 38–73)
NRBC BLD-RTO: 0 /100 WBC
OHS QRS DURATION: 124 MS
OHS QRS DURATION: 134 MS
OHS QTC CALCULATION: 490 MS
OHS QTC CALCULATION: 505 MS
PLATELET # BLD AUTO: 187 K/UL (ref 150–450)
PMV BLD AUTO: 10 FL (ref 9.2–12.9)
POTASSIUM SERPL-SCNC: 4.2 MMOL/L (ref 3.5–5.1)
PROT SERPL-MCNC: 6.4 G/DL (ref 6–8.4)
RBC # BLD AUTO: 4.4 M/UL (ref 4–5.4)
SODIUM SERPL-SCNC: 136 MMOL/L (ref 136–145)
TROPONIN I SERPL DL<=0.01 NG/ML-MCNC: 0.03 NG/ML (ref 0–0.03)
WBC # BLD AUTO: 8.53 K/UL (ref 3.9–12.7)

## 2024-05-07 PROCEDURE — 25000003 PHARM REV CODE 250: Performed by: PHYSICIAN ASSISTANT

## 2024-05-07 PROCEDURE — 83605 ASSAY OF LACTIC ACID: CPT | Performed by: PHYSICIAN ASSISTANT

## 2024-05-07 PROCEDURE — 99285 EMERGENCY DEPT VISIT HI MDM: CPT | Mod: 25

## 2024-05-07 PROCEDURE — 93010 ELECTROCARDIOGRAM REPORT: CPT | Mod: 76,,, | Performed by: INTERNAL MEDICINE

## 2024-05-07 PROCEDURE — 93005 ELECTROCARDIOGRAM TRACING: CPT | Mod: S$GLB,,, | Performed by: PHYSICIAN ASSISTANT

## 2024-05-07 PROCEDURE — 94761 N-INVAS EAR/PLS OXIMETRY MLT: CPT

## 2024-05-07 PROCEDURE — G0378 HOSPITAL OBSERVATION PER HR: HCPCS

## 2024-05-07 PROCEDURE — 84484 ASSAY OF TROPONIN QUANT: CPT | Performed by: EMERGENCY MEDICINE

## 2024-05-07 PROCEDURE — 85025 COMPLETE CBC W/AUTO DIFF WBC: CPT | Performed by: EMERGENCY MEDICINE

## 2024-05-07 PROCEDURE — 99214 OFFICE O/P EST MOD 30 MIN: CPT | Mod: S$GLB,,, | Performed by: PHYSICIAN ASSISTANT

## 2024-05-07 PROCEDURE — 25000003 PHARM REV CODE 250: Performed by: STUDENT IN AN ORGANIZED HEALTH CARE EDUCATION/TRAINING PROGRAM

## 2024-05-07 PROCEDURE — 99999 PR PBB SHADOW E&M-EST. PATIENT-LVL IV: CPT | Mod: PBBFAC,,, | Performed by: PHYSICIAN ASSISTANT

## 2024-05-07 PROCEDURE — 80053 COMPREHEN METABOLIC PANEL: CPT | Performed by: EMERGENCY MEDICINE

## 2024-05-07 PROCEDURE — 1101F PT FALLS ASSESS-DOCD LE1/YR: CPT | Mod: CPTII,S$GLB,, | Performed by: PHYSICIAN ASSISTANT

## 2024-05-07 PROCEDURE — 83880 ASSAY OF NATRIURETIC PEPTIDE: CPT | Performed by: EMERGENCY MEDICINE

## 2024-05-07 PROCEDURE — 87040 BLOOD CULTURE FOR BACTERIA: CPT | Performed by: PHYSICIAN ASSISTANT

## 2024-05-07 PROCEDURE — 3074F SYST BP LT 130 MM HG: CPT | Mod: CPTII,S$GLB,, | Performed by: PHYSICIAN ASSISTANT

## 2024-05-07 PROCEDURE — 63600175 PHARM REV CODE 636 W HCPCS: Performed by: PHYSICIAN ASSISTANT

## 2024-05-07 PROCEDURE — 1126F AMNT PAIN NOTED NONE PRSNT: CPT | Mod: CPTII,S$GLB,, | Performed by: PHYSICIAN ASSISTANT

## 2024-05-07 PROCEDURE — 3288F FALL RISK ASSESSMENT DOCD: CPT | Mod: CPTII,S$GLB,, | Performed by: PHYSICIAN ASSISTANT

## 2024-05-07 PROCEDURE — 93005 ELECTROCARDIOGRAM TRACING: CPT

## 2024-05-07 PROCEDURE — 96374 THER/PROPH/DIAG INJ IV PUSH: CPT

## 2024-05-07 PROCEDURE — 3078F DIAST BP <80 MM HG: CPT | Mod: CPTII,S$GLB,, | Performed by: PHYSICIAN ASSISTANT

## 2024-05-07 PROCEDURE — 93010 ELECTROCARDIOGRAM REPORT: CPT | Mod: S$GLB,,, | Performed by: INTERNAL MEDICINE

## 2024-05-07 PROCEDURE — 63600175 PHARM REV CODE 636 W HCPCS: Performed by: STUDENT IN AN ORGANIZED HEALTH CARE EDUCATION/TRAINING PROGRAM

## 2024-05-07 RX ORDER — TALC
6 POWDER (GRAM) TOPICAL NIGHTLY PRN
Status: DISCONTINUED | OUTPATIENT
Start: 2024-05-07 | End: 2024-05-22 | Stop reason: HOSPADM

## 2024-05-07 RX ORDER — PROMETHAZINE HYDROCHLORIDE 25 MG/1
25 TABLET ORAL EVERY 6 HOURS PRN
Status: DISCONTINUED | OUTPATIENT
Start: 2024-05-07 | End: 2024-05-22 | Stop reason: HOSPADM

## 2024-05-07 RX ORDER — BISACODYL 10 MG/1
10 SUPPOSITORY RECTAL DAILY PRN
Status: DISCONTINUED | OUTPATIENT
Start: 2024-05-07 | End: 2024-05-22 | Stop reason: HOSPADM

## 2024-05-07 RX ORDER — FUROSEMIDE 10 MG/ML
60 INJECTION INTRAMUSCULAR; INTRAVENOUS
Status: COMPLETED | OUTPATIENT
Start: 2024-05-07 | End: 2024-05-07

## 2024-05-07 RX ORDER — POLYETHYLENE GLYCOL 3350 17 G/17G
17 POWDER, FOR SOLUTION ORAL DAILY PRN
Status: DISCONTINUED | OUTPATIENT
Start: 2024-05-07 | End: 2024-05-10

## 2024-05-07 RX ORDER — ACETAMINOPHEN 325 MG/1
650 TABLET ORAL EVERY 4 HOURS PRN
Status: DISCONTINUED | OUTPATIENT
Start: 2024-05-07 | End: 2024-05-22 | Stop reason: HOSPADM

## 2024-05-07 RX ORDER — MUPIROCIN 20 MG/G
OINTMENT TOPICAL 3 TIMES DAILY
Qty: 15 G | Refills: 0 | Status: CANCELLED | OUTPATIENT
Start: 2024-05-07 | End: 2024-05-17

## 2024-05-07 RX ORDER — SODIUM CHLORIDE 0.9 % (FLUSH) 0.9 %
10 SYRINGE (ML) INJECTION
Status: DISCONTINUED | OUTPATIENT
Start: 2024-05-07 | End: 2024-05-22 | Stop reason: HOSPADM

## 2024-05-07 RX ORDER — METOPROLOL SUCCINATE 50 MG/1
100 TABLET, EXTENDED RELEASE ORAL
Status: COMPLETED | OUTPATIENT
Start: 2024-05-07 | End: 2024-05-07

## 2024-05-07 RX ORDER — IBUPROFEN 200 MG
16 TABLET ORAL
Status: DISCONTINUED | OUTPATIENT
Start: 2024-05-07 | End: 2024-05-22 | Stop reason: HOSPADM

## 2024-05-07 RX ORDER — IPRATROPIUM BROMIDE AND ALBUTEROL SULFATE 2.5; .5 MG/3ML; MG/3ML
3 SOLUTION RESPIRATORY (INHALATION) EVERY 4 HOURS PRN
Status: DISCONTINUED | OUTPATIENT
Start: 2024-05-07 | End: 2024-05-22 | Stop reason: HOSPADM

## 2024-05-07 RX ORDER — ONDANSETRON 4 MG/1
8 TABLET, ORALLY DISINTEGRATING ORAL EVERY 8 HOURS PRN
Status: DISCONTINUED | OUTPATIENT
Start: 2024-05-07 | End: 2024-05-22 | Stop reason: HOSPADM

## 2024-05-07 RX ORDER — BUSPIRONE HYDROCHLORIDE 5 MG/1
5 TABLET ORAL
COMMUNITY
Start: 2024-01-01

## 2024-05-07 RX ORDER — IBUPROFEN 200 MG
24 TABLET ORAL
Status: DISCONTINUED | OUTPATIENT
Start: 2024-05-07 | End: 2024-05-22 | Stop reason: HOSPADM

## 2024-05-07 RX ORDER — GLUCAGON 1 MG
1 KIT INJECTION
Status: DISCONTINUED | OUTPATIENT
Start: 2024-05-07 | End: 2024-05-22 | Stop reason: HOSPADM

## 2024-05-07 RX ADMIN — FUROSEMIDE 60 MG: 10 INJECTION, SOLUTION INTRAMUSCULAR; INTRAVENOUS at 07:05

## 2024-05-07 RX ADMIN — METOPROLOL SUCCINATE 100 MG: 50 TABLET, EXTENDED RELEASE ORAL at 08:05

## 2024-05-07 RX ADMIN — CEFTRIAXONE 1 G: 1 INJECTION, POWDER, FOR SOLUTION INTRAMUSCULAR; INTRAVENOUS at 10:05

## 2024-05-07 NOTE — FIRST PROVIDER EVALUATION
Medical screening examination initiated.  I have conducted a focused provider triage encounter, findings are as follows:    Brief history of present illness:  77 y/o F with history of HTN, HLD, HFpEF, COPD on home O2 (2L NC baseline O2 sat 92%) and pAF presentswith increasing LE swelling. Mild SOB but per caregiver does not have her home O2 with her because they usually don't travel with it    Vitals:    05/07/24 1645   BP: 133/75   BP Location: Right arm   Patient Position: Sitting   Pulse: 97   Resp: 16   Temp: 98.1 °F (36.7 °C)   SpO2: (!) 91%   Weight: 83.4 kg (183 lb 13.8 oz)       Pertinent physical exam:  bilateral LE edema    Brief workup plan:  CHF ex eval    Preliminary workup initiated; this workup will be continued and followed by the physician or advanced practice provider that is assigned to the patient when roomed.

## 2024-05-07 NOTE — TELEPHONE ENCOUNTER
Spoke with home health nurse Cristal. Pt has been taking 80 mg furosemide x 5 days. Wt had gone down to 172 but today is 177#. +3 edema, no worsening SOB but on 2lpm NC. 3 blisters to rt leg not related to friction. Labs were not scheduled (could not reach pt last week). Pt scheduled today w. Ms Parekh and agreed to date/time of appointment(s).

## 2024-05-07 NOTE — TELEPHONE ENCOUNTER
----- Message from Deisy Bradshaw sent at 5/7/2024  9:55 AM CDT -----  Regarding: Returning call  Esther returning call. Please call back @ [phone]. Thank you Deisy

## 2024-05-07 NOTE — ED NOTES
"Pt presents to ED via personal transport w/ c/o bilateral lower extremity edema x2 days. Pt states she increased her Lasix PO BID for five days w/ minimal relief. Pt denies shortness of breath or chest pain at this time + reports SOB w/ exertion. Reports 2 LPM via NC at baseline + states her oxygen saturation this morning w/ oxygen was 90%. 100% upon arrival to ED room on 2 LPM. Able to ambulate with assistance + does not use assistive devices at home. Denies fever, chills, nausea, vomiting.    Patient identifiers for Misa Barajas 78 y.o. female checked and correct.  Chief Complaint   Patient presents with    Referral     "A-fib" on metoprolol, took today's dose. Also reports bilateral leg swelling and decreased urination./ Recent increase to lasix but urine output has decreased. Denies chest pain, palpitations or burning with urination. Wears 2 L NC at baseline, did not bring with her. Denies sob     Past Medical History:   Diagnosis Date    Acute hypoxemic respiratory failure 6/26/2021    Acute superficial venous thrombosis of lower extremity, bilateral 1/25/2022    Aortic atherosclerosis     Arthritis     knee joint pain    Asthma-COPD overlap syndrome 8/22/2022    Breast cancer 2002    left breast & lymph nodes-s/p sx with chemo    Bronchitis     with flu-2/2014    Cataracts, bilateral     Chronic anticoagulation 5/4/2022    Chronic diastolic heart failure 12/24/2019    Chronic kidney disease, stage 3     History of chemotherapy     last treatment 12/2002 (had 8 treatments)    Hyperlipidemia 11/20/2016    Hypertension     Lymphedema of both lower extremities 1/25/2022    Nephrolithiasis 6/2/2014    Osteoporosis     Paroxysmal atrial fibrillation 6/7/2021    Renal calculi     hematuria    Renal osteodystrophy 1/14/2016    Venous stasis dermatitis of both lower extremities 1/25/2022    Vitamin D insufficiency      "

## 2024-05-07 NOTE — PROGRESS NOTES
Cardiology Clinic Note  Reason for Visit: Leg swelling     HPI:     PMHX:  COPD  HFpEF  pAF, on eliquis  HTN  HLD      Misa Barajas is a 78 y.o. F, who presents to clinic accomapnied by her two daughters today. She was hospitalized in early March for decompensated HFpEF. Prior to that admission she had stopped lasix due to episodes of nausea. Since that admission she has been complaint with furosemide 40 mg qD, has started Jardiance in early April and recently completed a 30 day event monitor (results are unavailable). She presents today with complaint of prominent leg swelling for the past week. He called the clinic and was advised to increase furosemide to 40 mg BID for the past 5 days. This has not improved her swelling. I am concerned for an area of possible cellulitis RLE, and EKG today is consistent with atrial flutter. She denies shortness of breath. She uses a wheelchair,     ROS:    Pertinent ROS included in HPI and below.  PMH:     Past Medical History:   Diagnosis Date    Acute hypoxemic respiratory failure 6/26/2021    Acute superficial venous thrombosis of lower extremity, bilateral 1/25/2022    Aortic atherosclerosis     Arthritis     knee joint pain    Asthma-COPD overlap syndrome 8/22/2022    Breast cancer 2002    left breast & lymph nodes-s/p sx with chemo    Bronchitis     with flu-2/2014    Cataracts, bilateral     Chronic anticoagulation 5/4/2022    Chronic diastolic heart failure 12/24/2019    Chronic kidney disease, stage 3     History of chemotherapy     last treatment 12/2002 (had 8 treatments)    Hyperlipidemia 11/20/2016    Hypertension     Lymphedema of both lower extremities 1/25/2022    Nephrolithiasis 6/2/2014    Osteoporosis     Paroxysmal atrial fibrillation 6/7/2021    Renal calculi     hematuria    Renal osteodystrophy 1/14/2016    Venous stasis dermatitis of both lower extremities 1/25/2022    Vitamin D insufficiency      Past Surgical History:   Procedure Laterality Date     BREAST BIOPSY Left 2002    core bx, +    BREAST BIOPSY Right 2018    core    BREAST BIOPSY Right 2019    BREAST LUMPECTOMY Left 2002    CYSTOSCOPY  4/28/2022    Procedure: CYSTOSCOPY;  Surgeon: Vik Ware MD;  [facility];  Service: Urology;;    CYSTOSCOPY  5/30/2022    Procedure: CYSTOSCOPY;  Surgeon: Bonnie Knutson MD;  [facility];  Service: Urology;;    LASER LITHOTRIPSY  5/30/2022    Procedure: LITHOTRIPSY, USING LASER;  Surgeon: Bonnie Knutson MD;  [facility];  Service: Urology;;    LITHOTRIPSY      MASTECTOMY Left 06/2002    left-& lymph node dissection    REPLACEMENT OF STENT Right 5/30/2022    Procedure: REPLACEMENT, STENT;  Surgeon: Bonnie Knutson MD;  [facility];  Service: Urology;  Laterality: Right;    RETROGRADE PYELOGRAPHY Right 4/28/2022    Procedure: PYELOGRAM, RETROGRADE;  Surgeon: Vik Ware MD;  [facility];  Service: Urology;  Laterality: Right;    ROBOT-ASSISTED LAPAROSCOPIC PARTIAL NEPHRECTOMY USING DA JESÚS XI Right 3/11/2021    Procedure: XI ROBOTIC NEPHRECTOMY, PARTIAL;  Surgeon: Taz Ortega MD;  Location: [facility] OR 2ND FLR;  Service: Urology;  Laterality: Right;  4hr/ gen with regional  [place] confirmation:  [id number] for 11:15am case NC    URETEROSCOPIC REMOVAL OF URETERIC CALCULUS Right 5/30/2022    Procedure: REMOVAL, CALCULUS, URETER, URETEROSCOPIC;  Surgeon: Bonnie Knutson MD;  [facility];  Service: Urology;  Laterality: Right;    ureteroscopy Bilateral     6.14    URETEROSCOPY Right 5/30/2022    Procedure: URETEROSCOPY;  Surgeon: Bonnie Knutson MD;  Location: [facility] OR [address] [place] [address];  Service: Urology;  Laterality: Right;     Allergies:     Review of patient's allergies indicates:   Allergen Reactions    Adhesive Rash     Pt states she removed a LATEX bandaid and the skin beneath was swollen and red. No other latex causes a reaction.      Medications:     No current facility-administered medications on file prior to visit.     Current Outpatient Medications on File Prior to Visit   Medication Sig Dispense Refill    acetaminophen (TYLENOL) 500 MG tablet Take 2 tablets (1,000 mg total) by mouth every 8 (eight) hours as needed for Pain. 42 tablet 0    apixaban (ELIQUIS) 5 mg Tab Take 1 tablet (5 mg total) by mouth 2 (two) times a day. (Patient taking differently: Take 5 mg by mouth once daily.) 180 tablet 3    aspirin (ECOTRIN) 81 MG EC tablet Take 81 mg by mouth once daily.      biotin 1 mg tablet Take 1,000 mcg by mouth once daily.      budesonide-glycopyr-formoterol (BREZTRI AEROSPHERE) 160-9-4.8 mcg/actuation HFAA Inhale 2 puffs into the lungs 2 (two) times daily. 32.1 g 3    busPIRone (BUSPAR) 5 MG Tab Take 5 mg by mouth as needed.      empagliflozin (JARDIANCE) 10 mg tablet Take 1 tablet (10 mg total) by mouth once daily. 90 tablet 3    fluticasone propionate (FLONASE) 50 mcg/actuation nasal spray 1 spray by Each Nostril route daily as needed (congestion).      furosemide (LASIX) 40 MG tablet Take 1 tablet by mouth once daily. 90 tablet 1    inhalation spacing device Use as directed for inhalation. 1 Device 0    losartan (COZAAR) 50 MG tablet Take 1 tablet (50 mg total) by mouth once daily. 90 tablet 1    metoprolol succinate (TOPROL-XL) 100 MG 24 hr tablet Take 1 tablet (100 mg total) by mouth once daily. 90 tablet 3    rosuvastatin (CRESTOR) 5 MG tablet Take 1 tablet (5 mg total) by mouth once daily. 90 tablet 3    vitamin D (VITAMIN D3) 1000 units Tab Take 2 tablets (2,000 Units total) by mouth once daily. 180 tablet 3     Social History:     Social History     Tobacco Use    Smoking status: Former     Current packs/day: 0.00     Average packs/day: 1 pack/day for 50.0 years (50.0 ttl pk-yrs)     Types: Cigarettes     Start date:      Quit date: 2009     Years since quittin.9    Smokeless tobacco: Never   Substance Use Topics  "   Alcohol use: No     Family History:     Family History   Problem Relation Name Age of Onset    Bone cancer Mother Bibi     Breast cancer Mother Bibi     Arthritis Mother Bibi         Breast Cancer    Breast cancer Sister      Cancer Father Maxwell         Asbestos cancer    No Known Problems Brother      Fibroids Daughter      Crohn's disease Daughter      No Known Problems Daughter      Arthritis Sister          Breast Cancer    Anesthesia problems Neg Hx      Glaucoma Neg Hx       Physical Exam:   /60   Pulse 92   Ht 5' 4" (1.626 m)   Wt 83.4 kg (183 lb 15.6 oz)   BMI 31.58 kg/m²      Physical Exam  Vitals and nursing note reviewed.   Constitutional:       Appearance: Normal appearance.   HENT:      Head: Normocephalic and atraumatic.   Neck:      Vascular: Normal carotid pulses. No carotid bruit or hepatojugular reflux.   Cardiovascular:      Rate and Rhythm: Normal rate. Rhythm irregularly irregular.      Chest Wall: PMI is not displaced.      Pulses:           Radial pulses are 2+ on the right side and 2+ on the left side.      Heart sounds: No murmur heard.  Pulmonary:      Effort: Pulmonary effort is normal.      Breath sounds: Normal breath sounds. No wheezing, rhonchi or rales.   Abdominal:      General: Bowel sounds are normal. There is no abdominal bruit.      Palpations: Abdomen is soft. There is no pulsatile mass.      Tenderness: There is no abdominal tenderness.   Musculoskeletal:      Right lower leg: 3+ Pitting Edema present.      Left lower leg: 3+ Pitting Edema present.   Feet:      Right foot:      Skin integrity: Skin integrity normal.      Left foot:      Skin integrity: Skin integrity normal.   Skin:     Capillary Refill: Capillary refill takes less than 2 seconds.   Neurological:      General: No focal deficit present.      Mental Status: She is alert.   Psychiatric:         Mood and Affect: Mood and affect normal.         Speech: Speech normal.         Behavior: Behavior " is cooperative.         Thought Content: Thought content normal.          Labs:     Blood Tests:  Lab Results   Component Value Date    BNP 1,165 (H) 05/07/2024     05/08/2024    K 3.8 05/08/2024     05/08/2024    CO2 24 05/08/2024    BUN 26 (H) 05/08/2024    CREATININE 1.5 (H) 05/08/2024    GLU 91 05/08/2024    HGBA1C 5.3 05/08/2024    MG 2.2 05/08/2024    AST 29 05/07/2024    ALT 18 05/07/2024    ALBUMIN 3.0 (L) 05/07/2024    PROT 6.4 05/07/2024    BILITOT 0.8 05/07/2024    WBC 8.94 05/08/2024    HGB 12.6 05/08/2024    HCT 39.3 05/08/2024    MCV 92 05/08/2024     05/08/2024    INR 0.9 08/09/2010    TSH 4.367 (H) 06/26/2021       Lab Results   Component Value Date    CHOL 148 08/21/2023    HDL 66 08/21/2023    TRIG 75 08/21/2023       Lab Results   Component Value Date    LDLCALC 67.0 08/21/2023         Imaging:     Echocardiogram  TTE 3/2/2024    Left Ventricle: The left ventricle is normal in size. Normal wall thickness. Normal wall motion. There is normal systolic function with a visually estimated ejection fraction of 60 - 65%. Grade II diastolic dysfunction.    Right Ventricle: Normal right ventricular cavity size. Wall thickness is normal. Right ventricle wall motion  is normal. Systolic function is normal.    Pulmonary Artery: The estimated pulmonary artery systolic pressure is 37 mmHg.    IVC/SVC: Normal venous pressure at 3 mmHg.    Stress testing  Nuclear stress test 5/13/2022    Equivocal myocardial perfusion scan.    There is a mild to moderate intensity, small to moderate sized, equivocal perfusion abnormality that is fixed in the inferior and inferolateral wall(s). This finding is equivocal due to bowel interference.    There are no other significant perfusion abnormalities.    The visually estimated ejection fraction is normal at rest and normal during stress.    There is normal wall motion at rest and post stress.    LV cavity size is normal at rest and normal at stress.    The  EKG portion of this study is negative for ischemia.    The patient reported no chest pain during the stress test.    There were no arrhythmias during stress.    When compared to the previous study from 4/15/2021, the inferior wall defect is now present on stress and rest images. There is underlying bowel which may be interfering.    Cath Lab  None    Other  None    [id number]  Atrial flutter with variable A-V block   Left axis deviation   Right ventricular conduction delay   LVH with QRS widening and repolarization abnormality ( R in aVL , Lisandro   product )   Abnormal R wave progression in the precordial leads -Cannot exclude    Anterolateral infarct (cited on or before 07-MAY-2024)vs due to the IVCD   Abnormal ECG   When compared with ECG of 22-MAR-2024 08:52,   Atrial flutter has replaced Sinus rhythm   RSR' pattern in V1 has replaced Right bundle branch block   Inferior infarct is now Present   Questionable change in initial forces of Anterior-lateral leads     Assessment:     1. Acute on chronic diastolic CHF (congestive heart failure)    2. Stage 3b chronic kidney disease    3. Atrial flutter, unspecified type    4. Leg swelling        Plan:     Acute on chronic diastolic CHF (congestive heart failure)    Stage 3b chronic kidney disease    Atrial flutter, unspecified type    Leg swelling  -     IN OFFICE EKG 12-LEAD (to Muse)  -     CBC Without Differential; Future; Expected date: 2024  -     B-TYPE NATRIURETIC PEPTIDE; Future; Expected date: 2024  -     Basic metabolic panel; Future; Expected date: 2024  -     Ambulatory referral/consult to Vascular Medicine; Future; Expected date: 2024    After discussing the patient with Dr. Grant I am recommending that she present to the ED for urgent labs, diuresis and possible consult for management of atrial arrhythmia. I can not prescribe abx or increase diuretic without recent lab assesment. The patient is recommended to keep her  upcoming outpatient appt with EP, and follow up with general cardiology in 2 weeks. The patient and her daughters are agreeable to this plan, and she will go directly to [facility] ED.         Signed:  Steph Parekh PA-C  Cardiology     5/8/2024 3:04 PM    Follow-up:     Future Appointments   Date Time Provider Department Center   5/22/2024  2:30 PM Steph Parekh PA-C [facility] CARDIO [facility]   5/28/2024  1:15 PM EKG, APPT [facility] EKG [facility]   5/28/2024  2:00 PM Taz Church MD [facility] ARRHYTH [facility]

## 2024-05-08 PROBLEM — R79.89 ELEVATED TROPONIN: Status: ACTIVE | Noted: 2024-05-08

## 2024-05-08 PROBLEM — L03.115 CELLULITIS OF RIGHT LOWER EXTREMITY: Status: ACTIVE | Noted: 2024-05-08

## 2024-05-08 PROBLEM — N30.00 ACUTE CYSTITIS: Status: ACTIVE | Noted: 2024-05-08

## 2024-05-08 LAB
ANION GAP SERPL CALC-SCNC: 11 MMOL/L (ref 8–16)
ASCENDING AORTA: 2.81 CM
AV INDEX (PROSTH): 0.68
AV MEAN GRADIENT: 3 MMHG
AV PEAK GRADIENT: 6 MMHG
AV VALVE AREA BY VELOCITY RATIO: 2.2 CM²
AV VALVE AREA: 2.14 CM²
AV VELOCITY RATIO: 0.7
BACTERIA #/AREA URNS AUTO: ABNORMAL /HPF
BASOPHILS # BLD AUTO: 0.06 K/UL (ref 0–0.2)
BASOPHILS NFR BLD: 0.7 % (ref 0–1.9)
BILIRUB UR QL STRIP: NEGATIVE
BSA FOR ECHO PROCEDURE: 1.94 M2
BUN SERPL-MCNC: 26 MG/DL (ref 8–23)
CALCIUM SERPL-MCNC: 10 MG/DL (ref 8.7–10.5)
CHLORIDE SERPL-SCNC: 102 MMOL/L (ref 95–110)
CLARITY UR REFRACT.AUTO: ABNORMAL
CO2 SERPL-SCNC: 24 MMOL/L (ref 23–29)
COLOR UR AUTO: YELLOW
CREAT SERPL-MCNC: 1.5 MG/DL (ref 0.5–1.4)
CV ECHO LV RWT: 0.41 CM
DIFFERENTIAL METHOD BLD: ABNORMAL
DOP CALC AO PEAK VEL: 1.2 M/S
DOP CALC AO VTI: 25.4 CM
DOP CALC LVOT AREA: 3.1 CM2
DOP CALC LVOT DIAMETER: 2 CM
DOP CALC LVOT PEAK VEL: 0.84 M/S
DOP CALC LVOT STROKE VOLUME: 54.48 CM3
DOP CALC MV VTI: 33.09 CM
DOP CALCLVOT PEAK VEL VTI: 17.35 CM
E WAVE DECELERATION TIME: 144.63 MSEC
E/A RATIO: 1.53
E/E' RATIO: 20.77 M/S
ECHO LV POSTERIOR WALL: 0.9 CM (ref 0.6–1.1)
EJECTION FRACTION: 55 %
EOSINOPHIL # BLD AUTO: 0.2 K/UL (ref 0–0.5)
EOSINOPHIL NFR BLD: 2.2 % (ref 0–8)
ERYTHROCYTE [DISTWIDTH] IN BLOOD BY AUTOMATED COUNT: 14 % (ref 11.5–14.5)
EST. GFR  (NO RACE VARIABLE): 35 ML/MIN/1.73 M^2
ESTIMATED AVG GLUCOSE: 105 MG/DL (ref 68–131)
FRACTIONAL SHORTENING: 43 % (ref 28–44)
GLUCOSE SERPL-MCNC: 91 MG/DL (ref 70–110)
GLUCOSE UR QL STRIP: ABNORMAL
HBA1C MFR BLD: 5.3 % (ref 4–5.6)
HCT VFR BLD AUTO: 39.3 % (ref 37–48.5)
HGB BLD-MCNC: 12.6 G/DL (ref 12–16)
HGB UR QL STRIP: ABNORMAL
HR MV ECHO: 69 BPM
HYALINE CASTS UR QL AUTO: 1 /LPF
IMM GRANULOCYTES # BLD AUTO: 0.02 K/UL (ref 0–0.04)
IMM GRANULOCYTES NFR BLD AUTO: 0.2 % (ref 0–0.5)
INTERVENTRICULAR SEPTUM: 0.9 CM (ref 0.6–1.1)
KETONES UR QL STRIP: NEGATIVE
LA MAJOR: 5.07 CM
LA MINOR: 4.88 CM
LA WIDTH: 3.8 CM
LEFT ATRIUM SIZE: 3.89 CM
LEFT ATRIUM VOLUME INDEX MOD: 33.5 ML/M2
LEFT ATRIUM VOLUME INDEX: 33.2 ML/M2
LEFT ATRIUM VOLUME MOD: 63 CM3
LEFT ATRIUM VOLUME: 62.49 CM3
LEFT INTERNAL DIMENSION IN SYSTOLE: 2.53 CM (ref 2.1–4)
LEFT VENTRICLE DIASTOLIC VOLUME INDEX: 25.92 ML/M2
LEFT VENTRICLE DIASTOLIC VOLUME: 48.73 ML
LEFT VENTRICLE MASS INDEX: 68 G/M2
LEFT VENTRICLE SYSTOLIC VOLUME INDEX: 12.2 ML/M2
LEFT VENTRICLE SYSTOLIC VOLUME: 22.88 ML
LEFT VENTRICULAR INTERNAL DIMENSION IN DIASTOLE: 4.4 CM (ref 3.5–6)
LEFT VENTRICULAR MASS: 128.02 G
LEUKOCYTE ESTERASE UR QL STRIP: ABNORMAL
LV LATERAL E/E' RATIO: 16.88 M/S
LV SEPTAL E/E' RATIO: 27 M/S
LYMPHOCYTES # BLD AUTO: 1.4 K/UL (ref 1–4.8)
LYMPHOCYTES NFR BLD: 15.9 % (ref 18–48)
MAGNESIUM SERPL-MCNC: 2.2 MG/DL (ref 1.6–2.6)
MCH RBC QN AUTO: 29.5 PG (ref 27–31)
MCHC RBC AUTO-ENTMCNC: 32.1 G/DL (ref 32–36)
MCV RBC AUTO: 92 FL (ref 82–98)
MICROSCOPIC COMMENT: ABNORMAL
MONOCYTES # BLD AUTO: 1 K/UL (ref 0.3–1)
MONOCYTES NFR BLD: 11.5 % (ref 4–15)
MV MEAN GRADIENT: 3 MMHG
MV PEAK A VEL: 0.88 M/S
MV PEAK E VEL: 1.35 M/S
MV PEAK GRADIENT: 8 MMHG
MV STENOSIS PRESSURE HALF TIME: 41.94 MS
MV VALVE AREA BY CONTINUITY EQUATION: 1.65 CM2
MV VALVE AREA P 1/2 METHOD: 5.25 CM2
NEUTROPHILS # BLD AUTO: 6.2 K/UL (ref 1.8–7.7)
NEUTROPHILS NFR BLD: 69.5 % (ref 38–73)
NITRITE UR QL STRIP: POSITIVE
NRBC BLD-RTO: 0 /100 WBC
OHS CV RV/LV RATIO: 0.9 CM
OHS QRS DURATION: 134 MS
OHS QTC CALCULATION: 500 MS
PH UR STRIP: 7 [PH] (ref 5–8)
PHOSPHATE SERPL-MCNC: 3.4 MG/DL (ref 2.7–4.5)
PISA TR MAX VEL: 2.7 M/S
PLATELET # BLD AUTO: 169 K/UL (ref 150–450)
PMV BLD AUTO: 9.9 FL (ref 9.2–12.9)
POTASSIUM SERPL-SCNC: 3.8 MMOL/L (ref 3.5–5.1)
PROT UR QL STRIP: NEGATIVE
RA MAJOR: 4.85 CM
RA PRESSURE ESTIMATED: 15 MMHG
RA WIDTH: 2.51 CM
RBC # BLD AUTO: 4.27 M/UL (ref 4–5.4)
RBC #/AREA URNS AUTO: 5 /HPF (ref 0–4)
RIGHT VENTRICULAR END-DIASTOLIC DIMENSION: 3.95 CM
RV TB RVSP: 18 MMHG
SINUS: 2.89 CM
SODIUM SERPL-SCNC: 137 MMOL/L (ref 136–145)
SP GR UR STRIP: 1 (ref 1–1.03)
SQUAMOUS #/AREA URNS AUTO: 3 /HPF
STJ: 2.17 CM
TDI LATERAL: 0.08 M/S
TDI SEPTAL: 0.05 M/S
TDI: 0.07 M/S
TR MAX PG: 29 MMHG
TRICUSPID ANNULAR PLANE SYSTOLIC EXCURSION: 1.24 CM
TROPONIN I SERPL DL<=0.01 NG/ML-MCNC: 0.03 NG/ML (ref 0–0.03)
TROPONIN I SERPL DL<=0.01 NG/ML-MCNC: 0.03 NG/ML (ref 0–0.03)
TV REST PULMONARY ARTERY PRESSURE: 44 MMHG
URN SPEC COLLECT METH UR: ABNORMAL
WBC # BLD AUTO: 8.94 K/UL (ref 3.9–12.7)
WBC #/AREA URNS AUTO: 73 /HPF (ref 0–5)
Z-SCORE OF LEFT VENTRICULAR DIMENSION IN END DIASTOLE: -1.77
Z-SCORE OF LEFT VENTRICULAR DIMENSION IN END SYSTOLE: -1.95

## 2024-05-08 PROCEDURE — 80048 BASIC METABOLIC PNL TOTAL CA: CPT | Performed by: PHYSICIAN ASSISTANT

## 2024-05-08 PROCEDURE — 94761 N-INVAS EAR/PLS OXIMETRY MLT: CPT

## 2024-05-08 PROCEDURE — 87077 CULTURE AEROBIC IDENTIFY: CPT | Mod: 59

## 2024-05-08 PROCEDURE — 63600175 PHARM REV CODE 636 W HCPCS: Performed by: PHYSICIAN ASSISTANT

## 2024-05-08 PROCEDURE — 83735 ASSAY OF MAGNESIUM: CPT | Performed by: PHYSICIAN ASSISTANT

## 2024-05-08 PROCEDURE — 25000003 PHARM REV CODE 250: Performed by: PHYSICIAN ASSISTANT

## 2024-05-08 PROCEDURE — 84484 ASSAY OF TROPONIN QUANT: CPT | Performed by: PHYSICIAN ASSISTANT

## 2024-05-08 PROCEDURE — 84100 ASSAY OF PHOSPHORUS: CPT | Performed by: PHYSICIAN ASSISTANT

## 2024-05-08 PROCEDURE — 63600175 PHARM REV CODE 636 W HCPCS: Performed by: HOSPITALIST

## 2024-05-08 PROCEDURE — 83036 HEMOGLOBIN GLYCOSYLATED A1C: CPT | Performed by: PHYSICIAN ASSISTANT

## 2024-05-08 PROCEDURE — 87088 URINE BACTERIA CULTURE: CPT

## 2024-05-08 PROCEDURE — 36415 COLL VENOUS BLD VENIPUNCTURE: CPT | Performed by: PHYSICIAN ASSISTANT

## 2024-05-08 PROCEDURE — 81001 URINALYSIS AUTO W/SCOPE: CPT

## 2024-05-08 PROCEDURE — 21400001 HC TELEMETRY ROOM

## 2024-05-08 PROCEDURE — 87186 SC STD MICRODIL/AGAR DIL: CPT

## 2024-05-08 PROCEDURE — 87086 URINE CULTURE/COLONY COUNT: CPT

## 2024-05-08 PROCEDURE — 85025 COMPLETE CBC W/AUTO DIFF WBC: CPT | Performed by: PHYSICIAN ASSISTANT

## 2024-05-08 RX ORDER — FUROSEMIDE 10 MG/ML
40 INJECTION INTRAMUSCULAR; INTRAVENOUS EVERY 12 HOURS
Status: DISCONTINUED | OUTPATIENT
Start: 2024-05-08 | End: 2024-05-08

## 2024-05-08 RX ORDER — FUROSEMIDE 10 MG/ML
40 INJECTION INTRAMUSCULAR; INTRAVENOUS 2 TIMES DAILY
Status: DISCONTINUED | OUTPATIENT
Start: 2024-05-08 | End: 2024-05-09

## 2024-05-08 RX ORDER — ASPIRIN 81 MG/1
81 TABLET ORAL DAILY
Status: DISCONTINUED | OUTPATIENT
Start: 2024-05-08 | End: 2024-05-22 | Stop reason: HOSPADM

## 2024-05-08 RX ORDER — METOPROLOL SUCCINATE 100 MG/1
100 TABLET, EXTENDED RELEASE ORAL DAILY
Status: DISCONTINUED | OUTPATIENT
Start: 2024-05-08 | End: 2024-05-22 | Stop reason: HOSPADM

## 2024-05-08 RX ORDER — ATORVASTATIN CALCIUM 20 MG/1
20 TABLET, FILM COATED ORAL DAILY
Status: DISCONTINUED | OUTPATIENT
Start: 2024-05-08 | End: 2024-05-22 | Stop reason: HOSPADM

## 2024-05-08 RX ADMIN — FUROSEMIDE 40 MG: 10 INJECTION, SOLUTION INTRAVENOUS at 06:05

## 2024-05-08 RX ADMIN — METOPROLOL SUCCINATE 100 MG: 100 TABLET, EXTENDED RELEASE ORAL at 09:05

## 2024-05-08 RX ADMIN — ATORVASTATIN CALCIUM 20 MG: 20 TABLET, FILM COATED ORAL at 09:05

## 2024-05-08 RX ADMIN — CEFTRIAXONE 1 G: 1 INJECTION, POWDER, FOR SOLUTION INTRAMUSCULAR; INTRAVENOUS at 09:05

## 2024-05-08 RX ADMIN — APIXABAN 2.5 MG: 2.5 TABLET, FILM COATED ORAL at 09:05

## 2024-05-08 RX ADMIN — FUROSEMIDE 40 MG: 10 INJECTION, SOLUTION INTRAVENOUS at 09:05

## 2024-05-08 RX ADMIN — ASPIRIN 81 MG: 81 TABLET, COATED ORAL at 09:05

## 2024-05-08 NOTE — ASSESSMENT & PLAN NOTE
Patient's COPD is controlled currently.  Patient is currently off COPD Pathway. Continue scheduled inhalers Supplemental oxygen and monitor respiratory status closely.   - satting well on home 2L NC

## 2024-05-08 NOTE — ED NOTES
Bed: [facility]  Expected date:   Expected time:   Means of arrival:   Comments:

## 2024-05-08 NOTE — ASSESSMENT & PLAN NOTE
- Cr 1.8 on admit, baseline 1.3  - Estimated Creatinine Clearance: 26.1 mL/min (A) (based on SCr of 1.8 mg/dL (H)).  - Likely 2/2 hypervolemia in setting of CHF exacerbation. Should improve with IV diuresis  - renally dose meds  - avoid nephrotoxic insults  - monitor I/Os  - monitor with daily BMP, replete lytes if necessary

## 2024-05-08 NOTE — PROGRESS NOTES
[facility] [address] [place] [address] Medicine  Progress Note    Patient Name: Misa Barajas  MRN: [id number]  Patient Class: IP- Inpatient   Admission Date: 5/7/2024  Length of Stay: 0 days  Attending Physician: Fiorella Ignacio MD  Primary Care Provider: Celia Carlisle MD        Subjective:     Principal Problem:Acute on chronic diastolic CHF (congestive heart failure)        HPI:  Misa Barajas is a 78 y.o. female with a PMHx of HTN, HLD, HFpEF, COPD on home O2 (2L NC baseline O2 sat 92%) and pAF presentswith increasing LE swelling.  She is gained 5 lb in the past 3 days despite increased Lasix use. She also endorses new redness and warmth with associated itching to her RLE. She has small pustules that she noticed to RLE a few days ago.  Denies fever, chills, chest pain, cough, SOB, abdominal pain, n/v/d, dysuria, headaches. Has chronic discoloration to BLEs without recent worsening.     ED: Afebrile . Hr 90s. Satting well on home 2L NC. CBC without leukocytosis or anemia. CMP notable for small MARQUEZ with Cr 1.8 (baseline 1.3) and BUN 26. BNP 1165. Trop 0.032 (0.028 at baseline). Lactic 1.2. CXR with Cardiomegaly with pulmonary vascular congestion and small bilateral pleural effusions, suggestive of pulmonary edema secondary to CHF. Given rocephin, toprol 100 mg and IV lasix 60 mg. Placed in observation for CHF exacerbation.     Overview/Hospital Course:  Misa Barajas is a 78 y.o. F who was admitted to  for further evaluation of acute on chronic diastolic CHF. Diuresing on IV lasix. Echo pending. Cr 1.8 on admission most likely 2/2 to fluid overloaded, improving. UA infectious, cx pending. Started on IV rocephin.       Interval History: Patient seen and assessed at bedside. Afebrile, VSS on RA. Diuresing with IV lasix. Cr improving with diuresis, 1.5 this am. Echo with EF 55%, unable to assess diastolic function due to AF, CVP 15mmHg      Review of Systems   Constitutional:  Negative for activity  change, chills and fever.   HENT:  Negative for trouble swallowing.    Eyes:  Negative for photophobia and visual disturbance.   Respiratory:  Positive for shortness of breath. Negative for cough and chest tightness.    Cardiovascular:  Positive for leg swelling. Negative for chest pain and palpitations.   Gastrointestinal:  Negative for abdominal pain, constipation, diarrhea, nausea and vomiting.   Genitourinary:  Negative for dysuria, frequency and hematuria.   Musculoskeletal:  Negative for back pain, gait problem and neck pain.   Skin:  Positive for color change. Negative for rash and wound.   Neurological:  Negative for dizziness, syncope, speech difficulty and light-headedness.   Psychiatric/Behavioral:  Negative for agitation and confusion. The patient is not nervous/anxious.      Objective:     Vital Signs (Most Recent):  Temp: 98.2 °F (36.8 °C) (05/08/24 0810)  Pulse: 98 (05/08/24 0941)  Resp: 17 (05/08/24 0941)  BP: 120/74 (05/08/24 0810)  SpO2: 95 % (05/08/24 0941) Vital Signs (24h Range):  Temp:  [97.7 °F (36.5 °C)-98.2 °F (36.8 °C)] 98.2 °F (36.8 °C)  Pulse:  [] 98  Resp:  [16-21] 17  SpO2:  [91 %-98 %] 95 %  BP: (102-138)/(58-75) 120/74     Weight: 83.4 kg (183 lb 13.8 oz)  Body mass index is 31.56 kg/m².    Intake/Output Summary (Last 24 hours) at 5/8/2024 1025  Last data filed at 5/8/2024 0817  Gross per 24 hour   Intake --   Output 620 ml   Net -620 ml         Physical Exam  Vitals and nursing note reviewed.   Constitutional:       General: She is not in acute distress.     Appearance: She is well-developed.   HENT:      Head: Normocephalic and atraumatic.      Mouth/Throat:      Pharynx: No oropharyngeal exudate.   Eyes:      General: No scleral icterus.     Conjunctiva/sclera: Conjunctivae normal.   Cardiovascular:      Rate and Rhythm: Normal rate and regular rhythm.      Heart sounds: Normal heart sounds.   Pulmonary:      Effort: Pulmonary effort is normal. No respiratory distress.       Breath sounds: Rales present. No wheezing.   Abdominal:      General: Bowel sounds are normal. There is no distension.      Palpations: Abdomen is soft.      Tenderness: There is no abdominal tenderness.   Musculoskeletal:         General: No tenderness. Normal range of motion.      Cervical back: Normal range of motion and neck supple.      Right lower leg: 3+ Pitting Edema present.      Left lower leg: 3+ Pitting Edema present.   Lymphadenopathy:      Cervical: No cervical adenopathy.   Skin:     General: Skin is warm and dry.      Capillary Refill: Capillary refill takes less than 2 seconds.      Findings: Erythema (erythema, warmth to RLE) present. No rash.   Neurological:      Mental Status: She is alert and oriented to person, place, and time.      Cranial Nerves: No cranial nerve deficit.      Sensory: No sensory deficit.      Coordination: Coordination normal.   Psychiatric:         Behavior: Behavior normal.         Thought Content: Thought content normal.         Judgment: Judgment normal.             Significant Labs: All pertinent labs within the past 24 hours have been reviewed.    Significant Imaging: I have reviewed all pertinent imaging results/findings within the past 24 hours.    Assessment/Plan:      * Acute on chronic diastolic CHF (congestive heart failure)  - last echo with EF 60-65%, GII LV DD  - BNP significantly elevated to 1165  - appears overloaded on exam with BLE edema and increased weight gain over the past few days despite increasing her home lasix from daily to BID  - CXR consistent with pulmonary edema and small bilateral pleural effusions   - continue BB  - Monitor on telemetry.   - Patient is on CHF pathway.  Monitor strict Is&Os and daily weights.  Place on fluid restriction of 1.5 L.   - start IV diuresis with lasix 40 mg BID  - repeat echo noted below    Results for orders placed during the hospital encounter of 05/07/24    Echo    Interpretation Summary    Left Ventricle: The  left ventricle is normal in size. Ventricular mass is normal. Normal wall thickness. Normal wall motion. There is normal systolic function. Ejection fraction by visual approximation is 55%. Unable to assess diastolic function due to atrial fibrillation.    Right Ventricle: Normal right ventricular cavity size. Wall thickness is normal. Systolic function is mildly reduced.    Aortic Valve: The aortic valve is a trileaflet valve. There is moderate aortic valve sclerosis. There is annular calcification present. Mildly restricted motion. No stenosis.    Tricuspid Valve: There is mild to moderate regurgitation.    Pulmonary Artery: The estimated pulmonary artery systolic pressure is 44 mmHg.    IVC/SVC: Elevated venous pressure at 15 mmHg.    Acute cystitis  - UA infectious on admission   - Ucx pending   - treat with rocephin   - bladder scan with 500mL, straight cath performed  - monitor patient for continued urinary retention      Cellulitis of right lower extremity  - afebrile without leukocytosis  - start IV rocephin 1g q24hr  - blood cultures pending  - wound care consulted    Elevated troponin  - trop 0.032 on admit, baseline ~0.028  - patient denies chest pain  - likely elevated 2/2 CHF exacerbation  - repeat trop pending --> at baseline  - monitor tele     Acute renal failure superimposed on stage 3a chronic kidney disease  - Cr 1.8 on admit, baseline 1.3  - Estimated Creatinine Clearance: 31.2 mL/min (A) (based on SCr of 1.5 mg/dL (H)).  - Likely 2/2 hypervolemia in setting of CHF exacerbation. Should improve with IV diuresis  - renally dose meds  - avoid nephrotoxic insults  - monitor I/Os  - monitor with daily BMP, replete lytes if necessary  - improved to 1.5 this am with diuresis    Chronic respiratory failure with hypoxia  - chronic and stable on home 2L    Centrilobular emphysema  Patient's COPD is controlled currently.  Patient is currently off COPD Pathway. Continue scheduled inhalers Supplemental oxygen  and monitor respiratory status closely.   - satting well on home 2L NC    Hyperlipidemia  - continue statin     Paroxysmal atrial fibrillation  Patient with Paroxysmal (<7 days) atrial fibrillation which is controlled currently with Beta Blocker. Patient is currently in atrial fibrillation.[id number] Score: 3. Anticoagulation indicated. Anticoagulation done with eliquis 5 mg BID  .    Essential hypertension  - controlled on admit  - continue toprol 100 mg and losartan 50 mg daily  - IV diuresis as above      VTE Risk Mitigation (From admission, onward)           Ordered     apixaban tablet 2.5 mg  2 times daily         05/08/24 0050     IP VTE HIGH RISK PATIENT  Once         05/07/24 2029     Reason for No Pharmacological VTE Prophylaxis  Once        Question:  Reasons:  Answer:  Already adequately anticoagulated on oral Anticoagulants    05/07/24 2029     Place sequential compression device  Until discontinued         05/07/24 2024                    Discharge Planning   NOHEMI: 5/9/2024     Code Status: Full Code   Is the patient medically ready for discharge?:     Reason for patient still in hospital (select all that apply): Patient trending condition and Treatment  Discharge Plan A: Home Health                  Adina Wolff PA-C  Department of Hospital Medicine   [facility]

## 2024-05-08 NOTE — NURSING
Nurses Note -- 4 Eyes      5/8/2024   1:16 AM      Skin assessed during: Admit      [] No Altered Skin Integrity Present    []Prevention Measures Documented      [x] Yes- Altered Skin Integrity Present or Discovered   [x] LDA Added if Not in Epic (Describe Wound)   [x] New Altered Skin Integrity was Present on Admit and Documented in LDA   [x] Wound Image Taken    Wound Care Consulted? Yes    Attending Nurse:  Francisca Hart RN/Staff Member:  Buffy

## 2024-05-08 NOTE — HOSPITAL COURSE
Misa Barajas is a 78 y.o. F who was admitted to  for further evaluation of acute on chronic diastolic CHF. Diuresing on IV lasix. Echo with EF 55%, unable to assess diastolic function due to a-fib, CVP 15mmHg. Cr 1.8 on admission most likely 2/2 to fluid overloaded, improved with diuresis. UA infectious, culture with staph aureus, enterococcus faecalis. IV rocephin switched to IV vancomycin. Transitioned to oral abx. Patient with urinary retention requiring straight cath. Initiated flomax and bladder scan q6hr. Continued retention - orosco cath placed but then passed voiding trial. Will set up urology follow-up OP. Cr bump of 1.9 after improvement with diuresis, originally thought to be due to possibly over-diuresis or abx. Unimproved with IVF. Mcgarry's stain with eosinophils, concerning for possible AIN - discontinued all abx. Nephrology consult placed for further recommendations. Holding all IVF and diuretics, measure I/Os, trend renal function. RP US with chronic medical renal disease, no hydronephrosis, 3mm non obstructing renal stone. Kidney function continues to improve, back to baseline on 5/21. Orthostatics positive, given IVF. Hold losartan and jardiance upon dc. Change lasix to prn to avoid hypotension. Follow ups with nephrology, cardiology, PCP, and urology. Plan and medication changes discussed with patient; agreeable to plan. ER precautions were given. All questions were answered to the patient's satisfaction and she was subsequently discharged home.     The mobility limitation cannot be sufficiently resolved by the use of a cane. Patient's functional mobility deficit can be sufficiently resolved with the use of a rolling walker. Patient's mobility limitation significantly impairs their ability to participate in one of more activities of daily living. The use of a rolling walker will significantly improve the patient's ability to participate in [facility]ADLS and the patient will use it on regular basis  in the home.

## 2024-05-08 NOTE — PLAN OF CARE
Problem: Adult Inpatient Plan of Care  Goal: Plan of Care Review  Outcome: Progressing  Goal: Patient-Specific Goal (Individualized)  Outcome: Progressing  Goal: Absence of Hospital-Acquired Illness or Injury  Outcome: Progressing  Goal: Optimal Comfort and Wellbeing  Outcome: Progressing  Goal: Readiness for Transition of Care  Outcome: Progressing     Problem: Acute Kidney Injury/Impairment  Goal: Fluid and Electrolyte Balance  Outcome: Progressing  Goal: Improved Oral Intake  Outcome: Progressing  Goal: Effective Renal Function  Outcome: Progressing     Problem: Infection  Goal: Absence of Infection Signs and Symptoms  Outcome: Progressing     Problem: Wound  Goal: Optimal Coping  Outcome: Progressing  Goal: Optimal Functional Ability  Outcome: Progressing  Goal: Absence of Infection Signs and Symptoms  Outcome: Progressing  Goal: Improved Oral Intake  Outcome: Progressing  Goal: Optimal Pain Control and Function  Outcome: Progressing  Goal: Skin Health and Integrity  Outcome: Progressing  Goal: Optimal Wound Healing  Outcome: Progressing     Problem: Fall Injury Risk  Goal: Absence of Fall and Fall-Related Injury  Outcome: Progressing     Problem: Skin Injury Risk Increased  Goal: Skin Health and Integrity  Outcome: Progressing

## 2024-05-08 NOTE — ASSESSMENT & PLAN NOTE
- trop 0.032 on admit, baseline ~0.028  - patient denies chest pain  - likely elevated 2/2 CHF exacerbation  - repeat trop pending --> at baseline  - monitor tele

## 2024-05-08 NOTE — ED PROVIDER NOTES
"Encounter Date: 5/7/2024       History     Chief Complaint   Patient presents with    Referral     "A-fib" on metoprolol, took today's dose. Also reports bilateral leg swelling and decreased urination./ Recent increase to lasix but urine output has decreased. Denies chest pain, palpitations or burning with urination. Wears 2 L NC at baseline, did not bring with her. Denies sob     78-year-old female with PMH of CHF, AFib on Eliquis and hypertension presents with peripheral edema.  Patient states that she has had worsening bilateral peripheral edema for the past 5 days despite increasing her Lasix.  She states that she is gained 5 lb in the past 3 days.  Denies chest pain, worsening shortness of breath, fever, chills, nausea, vomiting, abdominal pain, dysuria, hematuria, diarrhea, constipation, and black/bloody stools.  She is on 2 L O2 nasal cannula at baseline.  She typically takes 40 mg Lasix daily but has been taking 80 mg for the past few days without any improvement in her swelling.  She reports compliance with her Eliquis.    The history is provided by the patient and medical records.     Review of patient's allergies indicates:   Allergen Reactions    Adhesive Rash     Pt states she removed a LATEX bandaid and the skin beneath was swollen and red. No other latex causes a reaction.     Past Medical History:   Diagnosis Date    Acute hypoxemic respiratory failure 6/26/2021    Acute superficial venous thrombosis of lower extremity, bilateral 1/25/2022    Aortic atherosclerosis     Arthritis     knee joint pain    Asthma-COPD overlap syndrome 8/22/2022    Breast cancer 2002    left breast & lymph nodes-s/p sx with chemo    Bronchitis     with flu-2/2014    Cataracts, bilateral     Chronic anticoagulation 5/4/2022    Chronic diastolic heart failure 12/24/2019    Chronic kidney disease, stage 3     History of chemotherapy     last treatment 12/2002 (had 8 treatments)    Hyperlipidemia 11/20/2016    Hypertension     " Lymphedema of both lower extremities 1/25/2022    Nephrolithiasis 6/2/2014    Osteoporosis     Paroxysmal atrial fibrillation 6/7/2021    Renal calculi     hematuria    Renal osteodystrophy 1/14/2016    Venous stasis dermatitis of both lower extremities 1/25/2022    Vitamin D insufficiency      Past Surgical History:   Procedure Laterality Date    BREAST BIOPSY Left 2002    core bx, +    BREAST BIOPSY Right 2018    core    BREAST BIOPSY Right 2019    BREAST LUMPECTOMY Left 2002    CYSTOSCOPY  4/28/2022    Procedure: CYSTOSCOPY;  Surgeon: Vik Ware MD;  Location: [facility] OR 1ST FLR;  Service: Urology;;    CYSTOSCOPY  5/30/2022    Procedure: CYSTOSCOPY;  Surgeon: Bonnie Knutson MD;  Location: [facility] OR 1ST FLR;  Service: Urology;;    LASER LITHOTRIPSY  5/30/2022    Procedure: LITHOTRIPSY, USING LASER;  Surgeon: Bonnie Knutson MD;  Location: [facility] OR 1ST FLR;  Service: Urology;;    LITHOTRIPSY      MASTECTOMY Left 06/2002    left-& lymph node dissection    REPLACEMENT OF STENT Right 5/30/2022    Procedure: REPLACEMENT, STENT;  Surgeon: Bonnie Knutson MD;  Location: [facility] OR 1ST FLR;  Service: Urology;  Laterality: Right;    RETROGRADE PYELOGRAPHY Right 4/28/2022    Procedure: PYELOGRAM, RETROGRADE;  Surgeon: Vik Ware MD;  Location: [facility] OR 1ST FLR;  Service: Urology;  Laterality: Right;    ROBOT-ASSISTED LAPAROSCOPIC PARTIAL NEPHRECTOMY USING DA JESÚS XI Right 3/11/2021    Procedure: XI ROBOTIC NEPHRECTOMY, PARTIAL;  Surgeon: Taz Ortega MD;  Location: [facility] OR 2ND FLR;  Service: Urology;  Laterality: Right;  4hr/ gen with regional  [facility] confirmation:  [phone] for 11:15am case NC    URETEROSCOPIC REMOVAL OF URETERIC CALCULUS Right 5/30/2022    Procedure: REMOVAL, CALCULUS, URETER, URETEROSCOPIC;  Surgeon: Bonnie nKutson MD;  Location: [facility] OR 1ST FLR;  Service: Urology;  Laterality: Right;    ureteroscopy Bilateral     6.14    URETEROSCOPY Right 5/30/2022     Procedure: URETEROSCOPY;  Surgeon: Bonnie Knutson MD;  Location: [address];  Service: Urology;  Laterality: Right;     Family History   Problem Relation Name Age of Onset    Bone cancer Mother Bibi     Breast cancer Mother Bibi     Arthritis Mother Bibi         Breast Cancer    Breast cancer Sister      Cancer Father Maxwell         Asbestos cancer    No Known Problems Brother      Fibroids Daughter      Crohn's disease Daughter      No Known Problems Daughter      Arthritis Sister          Breast Cancer    Anesthesia problems Neg Hx      Glaucoma Neg Hx       Social History     Tobacco Use    Smoking status: Former     Current packs/day: 0.00     Average packs/day: 1 pack/day for 50.0 years (50.0 ttl pk-yrs)     Types: Cigarettes     Start date:      Quit date: 2009     Years since quittin.9    Smokeless tobacco: Never   Substance Use Topics    Alcohol use: No    Drug use: No     Review of Systems    Physical Exam     Initial Vitals [24 1645]   BP Pulse Resp Temp SpO2   133/75 97 16 98.1 °F (36.7 °C) (!) 91 %      MAP       --         Physical Exam    Nursing note and vitals reviewed.  Constitutional: She appears well-developed and well-nourished. She is not diaphoretic. No distress.   HENT:   Head: Normocephalic and atraumatic.   Eyes: Conjunctivae and EOM are normal. Pupils are equal, round, and reactive to light.   Neck: Neck supple.   Normal range of motion.  Cardiovascular:  Regular rhythm, normal heart sounds and intact distal pulses.           No murmur heard.  Tachycardic   Pulmonary/Chest: No respiratory distress. She has no wheezes. She has no rales.   Bilateral crackles   Abdominal: Abdomen is soft. She exhibits no distension. There is no abdominal tenderness. There is no rebound and no guarding.   Musculoskeletal:         General: Edema (3+ pitting edema bilaterally) present. No tenderness.      Cervical back: Normal range of motion and neck supple.       Comments: No calf pain or tenderness bilaterally     Neurological: She is alert and oriented to person, place, and time.   Skin: Skin is warm and dry. Capillary refill takes less than 2 seconds.         ED Course   Procedures  Labs Reviewed   CBC W/ AUTO DIFFERENTIAL - Abnormal; Notable for the following components:       Result Value    Lymph % 17.5 (*)     All other components within normal limits   COMPREHENSIVE METABOLIC PANEL - Abnormal; Notable for the following components:    BUN 26 (*)     Creatinine 1.8 (*)     Albumin 3.0 (*)     eGFR 28.1 (*)     All other components within normal limits   B-TYPE NATRIURETIC PEPTIDE - Abnormal; Notable for the following components:    BNP 1,165 (*)     All other components within normal limits   TROPONIN I - Abnormal; Notable for the following components:    Troponin I 0.032 (*)     All other components within normal limits   TROPONIN I     EKG Readings: (Independently Interpreted)   Atrial fibrillation at a rate of 100, no STEMI     ECG Results              EKG 12-lead (Final result)        Collection Time Result Time QRS Duration OHS QTC Calculation    05/07/24 16:52:04 05/07/24 17:07:06 134 505                     Final result by Interface, Lab In [facility] (05/07/24 17:07:11)                   Narrative:    Test Reason : R06.02,    Vent. Rate : 101 BPM     Atrial Rate : 249 BPM     P-R Int : 000 ms          QRS Dur : 134 ms      QT Int : 390 ms       P-R-T Axes : 087 -64 099 degrees     QTc Int : 505 ms    Atrial flutter with variable A-V block  Left axis deviation  Right bundle branch block  LVH with repolarization abnormality ( R in aVL )  Abnormal R wave progression in the precordial leads -Cannot exclude    Anterolateral infarct (cited on or before 07-MAY-2024)vs due to IVCD  Abnormal ECG  When compared with ECG of 07-MAY-2024 15:39,  T wave inversion less evident in Anterior leads  Confirmed by Nestor MCCLENDNO MD ([id number]) on 5/7/2024 5:07:02 PM    Referred By:              Confirmed By:Nestor MCCLENDON MD                                  Imaging Results              X-Ray Chest AP Portable (Final result)  Result time 05/07/24 19:51:34      Final result by Miguel Angel Pillai MD (05/07/24 19:51:34)                   Impression:      Cardiomegaly with pulmonary vascular congestion and small bilateral pleural effusions, suggestive of pulmonary edema secondary to CHF.      Electronically signed by: Miguel Angel Pillai MD  Date:    05/07/2024  Time:    19:51               Narrative:    EXAMINATION:  XR CHEST AP PORTABLE    CLINICAL HISTORY:  CHF;    TECHNIQUE:  Single frontal view of the chest was performed.    COMPARISON:  03/01/2024.    FINDINGS:  There are postoperative changes in the left axillary region.  Monitoring EKG leads are present.  The trachea is unremarkable.  The cardiomediastinal silhouette is stable.  There is no evidence of free air beneath the hemidiaphragms.  There is stable appearance of small bilateral pleural effusions.  There is no evidence of a pneumothorax.  There is no evidence of pneumomediastinum.  There is pulmonary vascular congestion.  There is no focal consolidation.  There are degenerative changes in the osseous structures.                                       Medications   sodium chloride 0.9% flush 10 mL (has no administration in time range)   albuterol-ipratropium 2.5 mg-0.5 mg/3 mL nebulizer solution 3 mL (has no administration in time range)   melatonin tablet 6 mg (has no administration in time range)   ondansetron disintegrating tablet 8 mg (has no administration in time range)   promethazine tablet 25 mg (has no administration in time range)   polyethylene glycol packet 17 g (has no administration in time range)   bisacodyL suppository 10 mg (has no administration in time range)   acetaminophen tablet 650 mg (has no administration in time range)   glucose chewable tablet 16 g (has no administration in time range)   glucose chewable tablet 24 g (has no administration  in time range)   glucagon (human recombinant) injection 1 mg (has no administration in time range)   dextrose 10% bolus 125 mL 125 mL (has no administration in time range)   dextrose 10% bolus 250 mL 250 mL (has no administration in time range)   cefTRIAXone (Rocephin) 1 g in dextrose 5 % in water (D5W) 100 mL IVPB (MB+) (0 g Intravenous Stopped 5/7/24 2333)   furosemide injection 60 mg (60 mg Intravenous Given 5/7/24 1923)   metoprolol succinate (TOPROL-XL) 24 hr tablet 100 mg (100 mg Oral Given 5/7/24 2007)     Medical Decision Making  CHF exacerbation, ACS, venous insufficiency, MARQUEZ, electrolyte abnormality, pneumonia    See ED course for additional information.    Amount and/or Complexity of Data Reviewed  External Data Reviewed: radiology.     Details: === Results for orders placed during the hospital encounter of 03/01/24 ===    Echo    - Interpretation Summary -  ·  Left Ventricle: The left ventricle is normal in size. Normal wall thickness. Normal wall motion. There is normal systolic function with a visually estimated ejection fraction of 60 - 65%. Grade II diastolic dysfunction.  ·  Right Ventricle: Normal right ventricular cavity size. Wall thickness is normal. Right ventricle wall motion  is normal. Systolic function is normal.  ·  Pulmonary Artery: The estimated pulmonary artery systolic pressure is 37 mmHg.  ·  IVC/SVC: Normal venous pressure at 3 mmHg.      Labs: ordered. Decision-making details documented in ED Course.  Radiology: ordered and independent interpretation performed. Decision-making details documented in ED Course.  ECG/medicine tests: ordered and independent interpretation performed. Decision-making details documented in ED Course.    Risk  Decision regarding hospitalization.               ED Course as of 05/08/24 0025   Tue May 07, 2024   1956 X-Ray Chest AP Portable  Pulmonary edema, consistent with CHF. [BD]   1956 BNP(!): 1,165  Significantly elevated, consistent with CHF.  Troponin  is slightly elevated but appears similar to prior [BD]   1956 Comprehensive metabolic panel(!)  Mild MARQUEZ with creatinine 1.8.  No significant electrolyte derangement [BD]   1957 Patient's workup consistent with CHF exacerbation.  I am treating with IV Lasix.    Discussed the case with Hospital Medicine who will place the patient observation to their service. [BD]   2018 Procedure: Critical Care  Please put in 30 minutes of critical care due to patient having a high risk of cardiorespiratory failure.        [BD]      ED Course User Index  [BD] Bo Mosher MD                           Clinical Impression:  Final diagnoses:  [R06.02] Shortness of breath  [I50.33] Acute on chronic diastolic CHF (congestive heart failure)          ED Disposition Condition    Observation                 Bo Mosher MD  05/08/24 0026

## 2024-05-08 NOTE — SUBJECTIVE & OBJECTIVE
Interval History: Patient seen and assessed at bedside. Afebrile, VSS on RA. Diuresing with IV lasix. Cr improving with diuresis, 1.5 this am. Echo with EF 55%, unable to assess diastolic function due to AF, CVP 15mmHg      Review of Systems   Constitutional:  Negative for activity change, chills and fever.   HENT:  Negative for trouble swallowing.    Eyes:  Negative for photophobia and visual disturbance.   Respiratory:  Positive for shortness of breath. Negative for cough and chest tightness.    Cardiovascular:  Positive for leg swelling. Negative for chest pain and palpitations.   Gastrointestinal:  Negative for abdominal pain, constipation, diarrhea, nausea and vomiting.   Genitourinary:  Negative for dysuria, frequency and hematuria.   Musculoskeletal:  Negative for back pain, gait problem and neck pain.   Skin:  Positive for color change. Negative for rash and wound.   Neurological:  Negative for dizziness, syncope, speech difficulty and light-headedness.   Psychiatric/Behavioral:  Negative for agitation and confusion. The patient is not nervous/anxious.      Objective:     Vital Signs (Most Recent):  Temp: 98.2 °F (36.8 °C) (05/08/24 0810)  Pulse: 98 (05/08/24 0941)  Resp: 17 (05/08/24 0941)  BP: 120/74 (05/08/24 0810)  SpO2: 95 % (05/08/24 0941) Vital Signs (24h Range):  Temp:  [97.7 °F (36.5 °C)-98.2 °F (36.8 °C)] 98.2 °F (36.8 °C)  Pulse:  [] 98  Resp:  [16-21] 17  SpO2:  [91 %-98 %] 95 %  BP: (102-138)/(58-75) 120/74     Weight: 83.4 kg (183 lb 13.8 oz)  Body mass index is 31.56 kg/m².    Intake/Output Summary (Last 24 hours) at 5/8/2024 1025  Last data filed at 5/8/2024 0817  Gross per 24 hour   Intake --   Output 620 ml   Net -620 ml         Physical Exam  Vitals and nursing note reviewed.   Constitutional:       General: She is not in acute distress.     Appearance: She is well-developed.   HENT:      Head: Normocephalic and atraumatic.      Mouth/Throat:      Pharynx: No oropharyngeal exudate.    Eyes:      General: No scleral icterus.     Conjunctiva/sclera: Conjunctivae normal.   Cardiovascular:      Rate and Rhythm: Normal rate and regular rhythm.      Heart sounds: Normal heart sounds.   Pulmonary:      Effort: Pulmonary effort is normal. No respiratory distress.      Breath sounds: Rales present. No wheezing.   Abdominal:      General: Bowel sounds are normal. There is no distension.      Palpations: Abdomen is soft.      Tenderness: There is no abdominal tenderness.   Musculoskeletal:         General: No tenderness. Normal range of motion.      Cervical back: Normal range of motion and neck supple.      Right lower leg: 3+ Pitting Edema present.      Left lower leg: 3+ Pitting Edema present.   Lymphadenopathy:      Cervical: No cervical adenopathy.   Skin:     General: Skin is warm and dry.      Capillary Refill: Capillary refill takes less than 2 seconds.      Findings: Erythema (erythema, warmth to RLE) present. No rash.   Neurological:      Mental Status: She is alert and oriented to person, place, and time.      Cranial Nerves: No cranial nerve deficit.      Sensory: No sensory deficit.      Coordination: Coordination normal.   Psychiatric:         Behavior: Behavior normal.         Thought Content: Thought content normal.         Judgment: Judgment normal.             Significant Labs: All pertinent labs within the past 24 hours have been reviewed.    Significant Imaging: I have reviewed all pertinent imaging results/findings within the past 24 hours.

## 2024-05-08 NOTE — ASSESSMENT & PLAN NOTE
- trop 0.032 on admit, baseline ~0.028  - patient denies chest pain  - likely elevated 2/2 CHF exacerbation  - repeat trop pending  - monitor tele

## 2024-05-08 NOTE — HPI
Misa Barajas is a 78 y.o. female with a PMHx of HTN, HLD, HFpEF, COPD on home O2 (2L NC baseline O2 sat 92%) and pAF presentswith increasing LE swelling.  She is gained 5 lb in the past 3 days despite increased Lasix use. She also endorses new redness and warmth with associated itching to her RLE. She has small pustules that she noticed to RLE a few days ago.  Denies fever, chills, chest pain, cough, SOB, abdominal pain, n/v/d, dysuria, headaches. Has chronic discoloration to BLEs without recent worsening.     ED: Afebrile . Hr 90s. Satting well on home 2L NC. CBC without leukocytosis or anemia. CMP notable for small MARQUEZ with Cr 1.8 (baseline 1.3) and BUN 26. BNP 1165. Trop 0.032 (0.028 at baseline). Lactic 1.2. CXR with Cardiomegaly with pulmonary vascular congestion and small bilateral pleural effusions, suggestive of pulmonary edema secondary to CHF. Given rocephin, toprol 100 mg and IV lasix 60 mg. Placed in observation for CHF exacerbation.

## 2024-05-08 NOTE — SUBJECTIVE & OBJECTIVE
Past Medical History:   Diagnosis Date    Acute hypoxemic respiratory failure 6/26/2021    Acute superficial venous thrombosis of lower extremity, bilateral 1/25/2022    Aortic atherosclerosis     Arthritis     knee joint pain    Asthma-COPD overlap syndrome 8/22/2022    Breast cancer 2002    left breast & lymph nodes-s/p sx with chemo    Bronchitis     with flu-2/2014    Cataracts, bilateral     Chronic anticoagulation 5/4/2022    Chronic diastolic heart failure 12/24/2019    Chronic kidney disease, stage 3     History of chemotherapy     last treatment 12/2002 (had 8 treatments)    Hyperlipidemia 11/20/2016    Hypertension     Lymphedema of both lower extremities 1/25/2022    Nephrolithiasis 6/2/2014    Osteoporosis     Paroxysmal atrial fibrillation 6/7/2021    Renal calculi     hematuria    Renal osteodystrophy 1/14/2016    Venous stasis dermatitis of both lower extremities 1/25/2022    Vitamin D insufficiency        Past Surgical History:   Procedure Laterality Date    BREAST BIOPSY Left 2002    core bx, +    BREAST BIOPSY Right 2018    core    BREAST BIOPSY Right 2019    BREAST LUMPECTOMY Left 2002    CYSTOSCOPY  4/28/2022    Procedure: CYSTOSCOPY;  Surgeon: Vik Ware MD;  Location: [facility] OR [address] [place] [address];  Service: Urology;;    CYSTOSCOPY  5/30/2022    Procedure: CYSTOSCOPY;  Surgeon: Bonnie Knutson MD;  Location: [facility] OR [address] [place] [address];  Service: Urology;;    LASER LITHOTRIPSY  5/30/2022    Procedure: LITHOTRIPSY, USING LASER;  Surgeon: Bonnie Knutson MD;  Location: [facility] OR [address] [place] [address];  Service: Urology;;    LITHOTRIPSY      MASTECTOMY Left 06/2002    left-& lymph node dissection    REPLACEMENT OF STENT Right 5/30/2022    Procedure: REPLACEMENT, STENT;  Surgeon: Bonnie Knutson MD;  Location: [facility] OR [address] [place] [address];  Service: Urology;  Laterality: Right;    RETROGRADE PYELOGRAPHY Right 4/28/2022    Procedure: PYELOGRAM, RETROGRADE;  Surgeon: Vik Ware MD;  Location: [facility] OR [address] [place] [address];   Service: Urology;  Laterality: Right;    ROBOT-ASSISTED LAPAROSCOPIC PARTIAL NEPHRECTOMY USING DA JESÚS XI Right 3/11/2021    Procedure: XI ROBOTIC NEPHRECTOMY, PARTIAL;  Surgeon: Taz Ortega MD;  [facility];  Service: Urology;  Laterality: Right;  4hr/ gen with regional  Critical access hospital confirmation:  [id number] for 11:15am case [place]    URETEROSCOPIC REMOVAL OF URETERIC CALCULUS Right 5/30/2022    Procedure: REMOVAL, CALCULUS, URETER, URETEROSCOPIC;  Surgeon: Bonnie Knutson MD;  [facility];  Service: Urology;  Laterality: Right;    ureteroscopy Bilateral     6.14    URETEROSCOPY Right 5/30/2022    Procedure: URETEROSCOPY;  Surgeon: Bonnie Knutson MD;  Location: [facility] OR [address] [place] [address];  Service: Urology;  Laterality: Right;       Review of patient's allergies indicates:   Allergen Reactions    Adhesive Rash     Pt states she removed a LATEX bandaid and the skin beneath was swollen and red. No other latex causes a reaction.       No current facility-administered medications on file prior to encounter.     Current Outpatient Medications on File Prior to Encounter   Medication Sig    acetaminophen (TYLENOL) 500 MG tablet Take 2 tablets (1,000 mg total) by mouth every 8 (eight) hours as needed for Pain.    apixaban (ELIQUIS) 5 mg Tab Take 1 tablet (5 mg total) by mouth 2 (two) times a day. (Patient taking differently: Take 5 mg by mouth once daily.)    aspirin (ECOTRIN) 81 MG EC tablet Take 81 mg by mouth once daily.    biotin 1 mg tablet Take 1,000 mcg by mouth once daily.    budesonide-glycopyr-formoterol (BREZTRI AEROSPHERE) 160-9-4.8 mcg/actuation HFAA Inhale 2 puffs into the lungs 2 (two) times daily.    busPIRone (BUSPAR) 5 MG Tab Take 5 mg by mouth as needed.    empagliflozin (JARDIANCE) 10 mg tablet Take 1 tablet (10 mg total) by mouth once daily.    fluticasone propionate (FLONASE) 50 mcg/actuation nasal spray 1 spray by Each Nostril route daily as needed (congestion).     furosemide (LASIX) 40 MG tablet Take 1 tablet by mouth once daily.    inhalation spacing device Use as directed for inhalation.    losartan (COZAAR) 50 MG tablet Take 1 tablet (50 mg total) by mouth once daily.    metoprolol succinate (TOPROL-XL) 100 MG 24 hr tablet Take 1 tablet (100 mg total) by mouth once daily.    rosuvastatin (CRESTOR) 5 MG tablet Take 1 tablet (5 mg total) by mouth once daily.    vitamin D (VITAMIN D3) 1000 units Tab Take 2 tablets (2,000 Units total) by mouth once daily.     Family History       Problem Relation (Age of Onset)    Arthritis Mother, Sister    Bone cancer Mother    Breast cancer Mother, Sister    Cancer Father    Crohn's disease Daughter    Fibroids Daughter    No Known Problems Brother, Daughter          Tobacco Use    Smoking status: Former     Current packs/day: 0.00     Average packs/day: 1 pack/day for 50.0 years (50.0 ttl pk-yrs)     Types: Cigarettes     Start date:      Quit date: 2009     Years since quittin.9    Smokeless tobacco: Never   Substance and Sexual Activity    Alcohol use: No    Drug use: No    Sexual activity: Not Currently     Partners: Male     Birth control/protection: Partner-Vasectomy     Review of Systems   Constitutional:  Negative for activity change, chills and fever.   HENT:  Negative for trouble swallowing.    Eyes:  Negative for photophobia and visual disturbance.   Respiratory:  Negative for cough, chest tightness and shortness of breath.    Cardiovascular:  Positive for leg swelling. Negative for chest pain and palpitations.   Gastrointestinal:  Negative for abdominal pain, constipation, diarrhea, nausea and vomiting.   Genitourinary:  Negative for dysuria, frequency and hematuria.   Musculoskeletal:  Negative for back pain, gait problem and neck pain.   Skin:  Positive for color change. Negative for rash and wound.   Neurological:  Negative for dizziness, syncope, speech difficulty and light-headedness.   Psychiatric/Behavioral:   Negative for agitation and confusion. The patient is not nervous/anxious.      Objective:     Vital Signs (Most Recent):  Temp: 97.9 °F (36.6 °C) (05/07/24 2232)  Pulse: 96 (05/07/24 2333)  Resp: 20 (05/07/24 2232)  BP: 116/65 (05/07/24 2232)  SpO2: 98 % (05/07/24 2232) Vital Signs (24h Range):  Temp:  [97.9 °F (36.6 °C)-98.2 °F (36.8 °C)] 97.9 °F (36.6 °C)  Pulse:  [] 96  Resp:  [16-21] 20  SpO2:  [91 %-98 %] 98 %  BP: (102-138)/(58-75) 116/65     Weight: 83.4 kg (183 lb 13.8 oz)  Body mass index is 31.56 kg/m².     Physical Exam  Vitals and nursing note reviewed.   Constitutional:       General: She is not in acute distress.     Appearance: She is well-developed.   HENT:      Head: Normocephalic and atraumatic.      Mouth/Throat:      Pharynx: No oropharyngeal exudate.   Eyes:      General: No scleral icterus.     Conjunctiva/sclera: Conjunctivae normal.   Cardiovascular:      Rate and Rhythm: Normal rate and regular rhythm.      Heart sounds: Normal heart sounds.   Pulmonary:      Effort: Pulmonary effort is normal. No respiratory distress.      Breath sounds: Rales present. No wheezing.   Abdominal:      General: Bowel sounds are normal. There is no distension.      Palpations: Abdomen is soft.      Tenderness: There is no abdominal tenderness.   Musculoskeletal:         General: No tenderness. Normal range of motion.      Cervical back: Normal range of motion and neck supple.      Right lower leg: Edema present.      Left lower leg: Edema present.   Lymphadenopathy:      Cervical: No cervical adenopathy.   Skin:     General: Skin is warm and dry.      Capillary Refill: Capillary refill takes less than 2 seconds.      Findings: Erythema (erythema, warmth to RLE. see media) present. No rash.   Neurological:      Mental Status: She is alert and oriented to person, place, and time.      Cranial Nerves: No cranial nerve deficit.      Sensory: No sensory deficit.      Coordination: Coordination normal.    Psychiatric:         Behavior: Behavior normal.         Thought Content: Thought content normal.         Judgment: Judgment normal.                      Significant Labs: All pertinent labs within the past 24 hours have been reviewed.  CBC:   Recent Labs   Lab 05/07/24  1752   WBC 8.53   HGB 13.2   HCT 41.3        CMP:   Recent Labs   Lab 05/07/24  1752      K 4.2      CO2 26   GLU 96   BUN 26*   CREATININE 1.8*   CALCIUM 10.3   PROT 6.4   ALBUMIN 3.0*   BILITOT 0.8   ALKPHOS 98   AST 29   ALT 18   ANIONGAP 10     Cardiac Markers:   Recent Labs   Lab 05/07/24  1752   BNP 1,165*     Lactic Acid:   Recent Labs   Lab 05/07/24  2251   LACTATE 1.2     Troponin:   Recent Labs   Lab 05/07/24  1752   TROPONINI 0.032*       Significant Imaging: I have reviewed all pertinent imaging results/findings within the past 24 hours.  Imaging Results              X-Ray Chest AP Portable (Final result)  Result time 05/07/24 19:51:34      Final result by Miguel Angel Pillai MD (05/07/24 19:51:34)                   Impression:      Cardiomegaly with pulmonary vascular congestion and small bilateral pleural effusions, suggestive of pulmonary edema secondary to CHF.      Electronically signed by: Miguel Angel Pillai MD  Date:    05/07/2024  Time:    19:51               Narrative:    EXAMINATION:  XR CHEST AP PORTABLE    CLINICAL HISTORY:  CHF;    TECHNIQUE:  Single frontal view of the chest was performed.    COMPARISON:  03/01/2024.    FINDINGS:  There are postoperative changes in the left axillary region.  Monitoring EKG leads are present.  The trachea is unremarkable.  The cardiomediastinal silhouette is stable.  There is no evidence of free air beneath the hemidiaphragms.  There is stable appearance of small bilateral pleural effusions.  There is no evidence of a pneumothorax.  There is no evidence of pneumomediastinum.  There is pulmonary vascular congestion.  There is no focal consolidation.  There are degenerative changes in  the osseous structures.

## 2024-05-08 NOTE — ASSESSMENT & PLAN NOTE
- last echo with EF 60-65%, GII LV DD  - BNP significantly elevated to 1165  - appears overloaded on exam with BLE edema and increased weight gain over the past few days despite increasing her home lasix from daily to BID  - CXR consistent with pulmonary edema and small bilateral pleural effusions   - continue BB  - Monitor on telemetry.   - Patient is on CHF pathway.  Monitor strict Is&Os and daily weights.  Place on fluid restriction of 1.5 L.   - start IV diuresis with lasix 40 mg BID

## 2024-05-08 NOTE — ASSESSMENT & PLAN NOTE
Patient with Paroxysmal (<7 days) atrial fibrillation which is controlled currently with Beta Blocker. Patient is currently in atrial fibrillation.[id number] Score: 3. Anticoagulation indicated. Anticoagulation done with eliquis 5 mg BID  .

## 2024-05-08 NOTE — PLAN OF CARE
Inpatient Upgrade Note    Misa Barajas has warranted treatment spanning two or more midnights of hospital level care for the management of heart failure and UTI . She continues to require IV antibiotics, IV diuresis, daily labs, and monitoring of vital signs. Her condition is also complicated by the following comorbidities:  78 y.o. female with a PMHx of HTN, HLD, HFpEF, COPD on home O2 (2L NC baseline O2 sat 92%) and pAF presentswith increasing LE swelling.  She is gained 5 lb in the past 3 days despite increased Lasix use.  .

## 2024-05-08 NOTE — ED NOTES
Pt placed on portable telemetry monitoring box # [id number].  Ability to visualize pt's rhythm confirmed with of telemetry.

## 2024-05-08 NOTE — H&P
"  [facility] - Observation [address] [place] [address] Medicine  History & Physical    Patient Name: Misa Barajas  MRN: [id number]  Patient Class: OP- Observation  Admission Date: 5/7/2024  Attending Physician: Fiorella Ignacio MD   Primary Care Provider: Celia Carlisle MD         Patient information was obtained from patient, relative(s), past medical records, and ER records.     Subjective:     Principal Problem:Acute on chronic diastolic CHF (congestive heart failure)    Chief Complaint:   Chief Complaint   Patient presents with    Referral     "A-fib" on metoprolol, took today's dose. Also reports bilateral leg swelling and decreased urination./ Recent increase to lasix but urine output has decreased. Denies chest pain, palpitations or burning with urination. Wears 2 L NC at baseline, did not bring with her. Denies sob        HPI: Misa Barajas is a 78 y.o. female with a PMHx of HTN, HLD, HFpEF, COPD on home O2 (2L NC baseline O2 sat 92%) and pAF presentswith increasing LE swelling.  She is gained 5 lb in the past 3 days despite increased Lasix use. She also endorses new redness and warmth with associated itching to her RLE. She has small pustules that she noticed to RLE a few days ago.  Denies fever, chills, chest pain, cough, SOB, abdominal pain, n/v/d, dysuria, headaches. Has chronic discoloration to BLEs without recent worsening.     ED: Afebrile . Hr 90s. Satting well on home 2L NC. CBC without leukocytosis or anemia. CMP notable for small MARQUEZ with Cr 1.8 (baseline 1.3) and BUN 26. BNP 1165. Trop 0.032 (0.028 at baseline). Lactic 1.2. CXR with Cardiomegaly with pulmonary vascular congestion and small bilateral pleural effusions, suggestive of pulmonary edema secondary to CHF. Given rocephin, toprol 100 mg and IV lasix 60 mg. Placed in observation for CHF exacerbation.     Past Medical History:   Diagnosis Date    Acute hypoxemic respiratory failure 6/26/2021    Acute superficial venous thrombosis of lower " extremity, bilateral 1/25/2022    Aortic atherosclerosis     Arthritis     knee joint pain    Asthma-COPD overlap syndrome 8/22/2022    Breast cancer 2002    left breast & lymph nodes-s/p sx with chemo    Bronchitis     with flu-2/2014    Cataracts, bilateral     Chronic anticoagulation 5/4/2022    Chronic diastolic heart failure 12/24/2019    Chronic kidney disease, stage 3     History of chemotherapy     last treatment 12/2002 (had 8 treatments)    Hyperlipidemia 11/20/2016    Hypertension     Lymphedema of both lower extremities 1/25/2022    Nephrolithiasis 6/2/2014    Osteoporosis     Paroxysmal atrial fibrillation 6/7/2021    Renal calculi     hematuria    Renal osteodystrophy 1/14/2016    Venous stasis dermatitis of both lower extremities 1/25/2022    Vitamin D insufficiency        Past Surgical History:   Procedure Laterality Date    BREAST BIOPSY Left 2002    core bx, +    BREAST BIOPSY Right 2018    core    BREAST BIOPSY Right 2019    BREAST LUMPECTOMY Left 2002    CYSTOSCOPY  4/28/2022    Procedure: CYSTOSCOPY;  Surgeon: Vik Wrae MD;  Location: [facility] OR [address] [place] [address];  Service: Urology;;    CYSTOSCOPY  5/30/2022    Procedure: CYSTOSCOPY;  Surgeon: Bonnie Knutson MD;  Location: [facility] OR [address] [place] [address];  Service: Urology;;    LASER LITHOTRIPSY  5/30/2022    Procedure: LITHOTRIPSY, USING LASER;  Surgeon: Bonnie Knutson MD;  [facility];  Service: Urology;;    LITHOTRIPSY      MASTECTOMY Left 06/2002    left-& lymph node dissection    REPLACEMENT OF STENT Right 5/30/2022    Procedure: REPLACEMENT, STENT;  Surgeon: Bonnie Knutson MD;  [facility];  Service: Urology;  Laterality: Right;    RETROGRADE PYELOGRAPHY Right 4/28/2022    Procedure: PYELOGRAM, RETROGRADE;  Surgeon: Vik Ware MD;  Location: [facility] OR [address] [place] [address];  Service: Urology;  Laterality: Right;    ROBOT-ASSISTED LAPAROSCOPIC PARTIAL NEPHRECTOMY USING DA JESÚS XI Right 3/11/2021    Procedure: XI  ROBOTIC NEPHRECTOMY, PARTIAL;  Surgeon: Taz Ortega MD;  Location: [facility] OR 2ND FLR;  Service: Urology;  Laterality: Right;  4hr/ gen with regional  [facility] confirmation:  [id number] for 11:15am case NC    URETEROSCOPIC REMOVAL OF URETERIC CALCULUS Right 5/30/2022    Procedure: REMOVAL, CALCULUS, URETER, URETEROSCOPIC;  Surgeon: Bonnie Knutson MD;  Location: [facility] OR 1ST FLR;  Service: Urology;  Laterality: Right;    ureteroscopy Bilateral     6.14    URETEROSCOPY Right 5/30/2022    Procedure: URETEROSCOPY;  Surgeon: Bonnie Knutson MD;  Location: [facility] OR 1ST FLR;  Service: Urology;  Laterality: Right;       Review of patient's allergies indicates:   Allergen Reactions    Adhesive Rash     Pt states she removed a LATEX bandaid and the skin beneath was swollen and red. No other latex causes a reaction.       No current facility-administered medications on file prior to encounter.     Current Outpatient Medications on File Prior to Encounter   Medication Sig    acetaminophen (TYLENOL) 500 MG tablet Take 2 tablets (1,000 mg total) by mouth every 8 (eight) hours as needed for Pain.    apixaban (ELIQUIS) 5 mg Tab Take 1 tablet (5 mg total) by mouth 2 (two) times a day. (Patient taking differently: Take 5 mg by mouth once daily.)    aspirin (ECOTRIN) 81 MG EC tablet Take 81 mg by mouth once daily.    biotin 1 mg tablet Take 1,000 mcg by mouth once daily.    budesonide-glycopyr-formoterol (BREZTRI AEROSPHERE) 160-9-4.8 mcg/actuation HFAA Inhale 2 puffs into the lungs 2 (two) times daily.    busPIRone (BUSPAR) 5 MG Tab Take 5 mg by mouth as needed.    empagliflozin (JARDIANCE) 10 mg tablet Take 1 tablet (10 mg total) by mouth once daily.    fluticasone propionate (FLONASE) 50 mcg/actuation nasal spray 1 spray by Each Nostril route daily as needed (congestion).    furosemide (LASIX) 40 MG tablet Take 1 tablet by mouth once daily.    inhalation spacing device Use as directed for inhalation.    losartan  (COZAAR) 50 MG tablet Take 1 tablet (50 mg total) by mouth once daily.    metoprolol succinate (TOPROL-XL) 100 MG 24 hr tablet Take 1 tablet (100 mg total) by mouth once daily.    rosuvastatin (CRESTOR) 5 MG tablet Take 1 tablet (5 mg total) by mouth once daily.    vitamin D (VITAMIN D3) 1000 units Tab Take 2 tablets (2,000 Units total) by mouth once daily.     Family History       Problem Relation (Age of Onset)    Arthritis Mother, Sister    Bone cancer Mother    Breast cancer Mother, Sister    Cancer Father    Crohn's disease Daughter    Fibroids Daughter    No Known Problems Brother, Daughter          Tobacco Use    Smoking status: Former     Current packs/day: 0.00     Average packs/day: 1 pack/day for 50.0 years (50.0 ttl pk-yrs)     Types: Cigarettes     Start date:      Quit date: 2009     Years since quittin.9    Smokeless tobacco: Never   Substance and Sexual Activity    Alcohol use: No    Drug use: No    Sexual activity: Not Currently     Partners: Male     Birth control/protection: Partner-Vasectomy     Review of Systems   Constitutional:  Negative for activity change, chills and fever.   HENT:  Negative for trouble swallowing.    Eyes:  Negative for photophobia and visual disturbance.   Respiratory:  Negative for cough, chest tightness and shortness of breath.    Cardiovascular:  Positive for leg swelling. Negative for chest pain and palpitations.   Gastrointestinal:  Negative for abdominal pain, constipation, diarrhea, nausea and vomiting.   Genitourinary:  Negative for dysuria, frequency and hematuria.   Musculoskeletal:  Negative for back pain, gait problem and neck pain.   Skin:  Positive for color change. Negative for rash and wound.   Neurological:  Negative for dizziness, syncope, speech difficulty and light-headedness.   Psychiatric/Behavioral:  Negative for agitation and confusion. The patient is not nervous/anxious.      Objective:     Vital Signs (Most Recent):  Temp: 97.9 °F  (36.6 °C) (05/07/24 2232)  Pulse: 96 (05/07/24 2333)  Resp: 20 (05/07/24 2232)  BP: 116/65 (05/07/24 2232)  SpO2: 98 % (05/07/24 2232) Vital Signs (24h Range):  Temp:  [97.9 °F (36.6 °C)-98.2 °F (36.8 °C)] 97.9 °F (36.6 °C)  Pulse:  [] 96  Resp:  [16-21] 20  SpO2:  [91 %-98 %] 98 %  BP: (102-138)/(58-75) 116/65     Weight: 83.4 kg (183 lb 13.8 oz)  Body mass index is 31.56 kg/m².     Physical Exam  Vitals and nursing note reviewed.   Constitutional:       General: She is not in acute distress.     Appearance: She is well-developed.   HENT:      Head: Normocephalic and atraumatic.      Mouth/Throat:      Pharynx: No oropharyngeal exudate.   Eyes:      General: No scleral icterus.     Conjunctiva/sclera: Conjunctivae normal.   Cardiovascular:      Rate and Rhythm: Normal rate and regular rhythm.      Heart sounds: Normal heart sounds.   Pulmonary:      Effort: Pulmonary effort is normal. No respiratory distress.      Breath sounds: Rales present. No wheezing.   Abdominal:      General: Bowel sounds are normal. There is no distension.      Palpations: Abdomen is soft.      Tenderness: There is no abdominal tenderness.   Musculoskeletal:         General: No tenderness. Normal range of motion.      Cervical back: Normal range of motion and neck supple.      Right lower leg: Edema present.      Left lower leg: Edema present.   Lymphadenopathy:      Cervical: No cervical adenopathy.   Skin:     General: Skin is warm and dry.      Capillary Refill: Capillary refill takes less than 2 seconds.      Findings: Erythema (erythema, warmth to RLE. see media) present. No rash.   Neurological:      Mental Status: She is alert and oriented to person, place, and time.      Cranial Nerves: No cranial nerve deficit.      Sensory: No sensory deficit.      Coordination: Coordination normal.   Psychiatric:         Behavior: Behavior normal.         Thought Content: Thought content normal.         Judgment: Judgment normal.                       Significant Labs: All pertinent labs within the past 24 hours have been reviewed.  CBC:   Recent Labs   Lab 05/07/24  1752   WBC 8.53   HGB 13.2   HCT 41.3        CMP:   Recent Labs   Lab 05/07/24  1752      K 4.2      CO2 26   GLU 96   BUN 26*   CREATININE 1.8*   CALCIUM 10.3   PROT 6.4   ALBUMIN 3.0*   BILITOT 0.8   ALKPHOS 98   AST 29   ALT 18   ANIONGAP 10     Cardiac Markers:   Recent Labs   Lab 05/07/24  1752   BNP 1,165*     Lactic Acid:   Recent Labs   Lab 05/07/24  2251   LACTATE 1.2     Troponin:   Recent Labs   Lab 05/07/24  1752   TROPONINI 0.032*       Significant Imaging: I have reviewed all pertinent imaging results/findings within the past 24 hours.  Imaging Results              X-Ray Chest AP Portable (Final result)  Result time 05/07/24 19:51:34      Final result by Miguel Angel Pillai MD (05/07/24 19:51:34)                   Impression:      Cardiomegaly with pulmonary vascular congestion and small bilateral pleural effusions, suggestive of pulmonary edema secondary to CHF.      Electronically signed by: Miguel Angel Pillai MD  Date:    05/07/2024  Time:    19:51               Narrative:    EXAMINATION:  XR CHEST AP PORTABLE    CLINICAL HISTORY:  CHF;    TECHNIQUE:  Single frontal view of the chest was performed.    COMPARISON:  03/01/2024.    FINDINGS:  There are postoperative changes in the left axillary region.  Monitoring EKG leads are present.  The trachea is unremarkable.  The cardiomediastinal silhouette is stable.  There is no evidence of free air beneath the hemidiaphragms.  There is stable appearance of small bilateral pleural effusions.  There is no evidence of a pneumothorax.  There is no evidence of pneumomediastinum.  There is pulmonary vascular congestion.  There is no focal consolidation.  There are degenerative changes in the osseous structures.                                    Assessment/Plan:     * Acute on chronic diastolic CHF (congestive heart failure)  -  last echo with EF 60-65%, GII LV DD  - BNP significantly elevated to 1165  - appears overloaded on exam with BLE edema and increased weight gain over the past few days despite increasing her home lasix from daily to BID  - CXR consistent with pulmonary edema and small bilateral pleural effusions   - continue BB  - Monitor on telemetry.   - Patient is on CHF pathway.  Monitor strict Is&Os and daily weights.  Place on fluid restriction of 1.5 L.   - start IV diuresis with lasix 40 mg BID    Acute renal failure superimposed on stage 3a chronic kidney disease  - Cr 1.8 on admit, baseline 1.3  - Estimated Creatinine Clearance: 26.1 mL/min (A) (based on SCr of 1.8 mg/dL (H)).  - Likely 2/2 hypervolemia in setting of CHF exacerbation. Should improve with IV diuresis  - renally dose meds  - avoid nephrotoxic insults  - monitor I/Os  - monitor with daily BMP, replete lytes if necessary    Cellulitis of right lower extremity  - afebrile without leukocytosis  - start IV rocephin 1g q24hr    Elevated troponin  - trop 0.032 on admit, baseline ~0.028  - patient denies chest pain  - likely elevated 2/2 CHF exacerbation  - repeat trop pending  - monitor tele     Chronic respiratory failure with hypoxia  - chronic and stable on home 2L    Centrilobular emphysema  Patient's COPD is controlled currently.  Patient is currently off COPD Pathway. Continue scheduled inhalers Supplemental oxygen and monitor respiratory status closely.   - satting well on home 2L NC    Hyperlipidemia  - continue statin     Paroxysmal atrial fibrillation  Patient with Paroxysmal (<7 days) atrial fibrillation which is controlled currently with Beta Blocker. Patient is currently in atrial fibrillation.[id number] Score: 3. Anticoagulation indicated. Anticoagulation done with eliquis 5 mg BID  .    Essential hypertension  - controlled on admit  - continue toprol 100 mg and losartan 50 mg daily  - IV diuresis as above      VTE Risk Mitigation (From admission,  onward)           Ordered     apixaban tablet 2.5 mg  2 times daily         05/08/24 0050     IP VTE HIGH RISK PATIENT  Once         05/07/24 2029     Reason for No Pharmacological VTE Prophylaxis  Once        Question:  Reasons:  Answer:  Already adequately anticoagulated on oral Anticoagulants    05/07/24 2029     Place sequential compression device  Until discontinued         05/07/24 2024                         On 05/08/2024, patient should be placed in hospital observation services under my care in collaboration with Dr. Guille Godoy.           Emmy Loyola PA-C  Department of Hospital Medicine  [facility] - Observation 11H

## 2024-05-08 NOTE — ASSESSMENT & PLAN NOTE
Patient with Paroxysmal (<7 days) atrial fibrillation which is controlled currently with Beta Blocker. Patient is currently in atrial fibrillation.[id number] Score: 3. Anticoagulation indicated. Anticoagulation done with eliquis 5 mg BID  .

## 2024-05-08 NOTE — ASSESSMENT & PLAN NOTE
- afebrile without leukocytosis  - start IV rocephin 1g q24hr  - blood cultures pending  - wound care consulted

## 2024-05-08 NOTE — ED NOTES
I received report from GINGER Tate, and assumed care of pt at this time. Pt resting comfortably and independently repositioned in stretcher with bed locked in lowest position for safety. NAD noted at this time. Respirations even and unlabored and visible chest rise noted.  Patient offered bathroom assistance and denies need at this time. Pt instructed to call if assistance is needed.  Call light within reach. No needs at this time. Will continue to monitor.

## 2024-05-08 NOTE — CONSULTS
Food & Nutrition  Education     Diet Education: Fluid and Salt restriction   Time Spent: 10 minutes   Learners: Patient and Caregiver     Handouts provided: Low Sodium Nutrition Therapy, Fluid-Restricted Nutrition Therapy      Comments: Discussed importance of a low sodium diet. Reviewed high sodium foods that should be avoided. Food labels, salt free seasonings, and recommended sodium intake reviewed. Encouraged healthy, fresh foods that are low in sodium that are good for consumption. Fluid intake and conversions discussed. Foods considered fluids were reviewed and encouraged to monitor. General healthy diet encouraged. All questions/concerns were addressed.     Follow-Up: Yes     Please Re-consult as needed.   Thanks!    Sammi Newby, MS, RD, LDN

## 2024-05-08 NOTE — ED NOTES
Adult Physical Assessment  LOC: Misa Barajas, 78 y.o. female verified via two identifiers.  The patient is awake, alert, oriented and speaking appropriately at this time.  APPEARANCE: Patient resting comfortably and appears to be in no acute distress at this time. Patient is clean and well groomed, patient's clothing is properly fastened.  SKIN:The skin is warm and dry, color consistent with ethnicity, patient has normal skin turgor and moist mucus membranes, skin intact, bruising noted.  MUSCULOSKELETAL: Patient moving all extremities well, bilateral leg swelling noted.Generalized weakness noted.  RESPIRATORY: Airway is open and patent, respirations are spontaneous, patient has a normal effort and rate, no accessory muscle use noted. Reports no SOB.  CARDIAC: Patient has a normal rate and rhythm, no periphreal edema noted in any extremity, capillary refill < 3 seconds in all extremities  ABDOMEN: Soft and non tender to palpation, no abdominal distention noted.   NEUROLOGIC: Eyes open spontaneously, behavior appropriate to situation, follows commands, facial expression symmetrical, bilateral hand grasp equal and even, purposeful motor response noted, normal sensation in all extremities when touched with a finger.

## 2024-05-08 NOTE — PLAN OF CARE
[facility] Observation 11H  Initial Discharge Assessment       Primary Care Provider: Celia Carlisle MD    Admission Diagnosis: Shortness of breath [R06.02]  Chest pain [R07.9]  Acute on chronic diastolic CHF (congestive heart failure) [I50.33]    Admission Date: 5/7/2024  Expected Discharge Date: 5/9/2024         Payor: [facility] MEDICARE ADVANTAGE / Plan: [employer] MCARE ADV / Product Type: Medicare Advantage /     Extended Emergency Contact Information  Primary Emergency Contact: Esther Barajas  Mobile Phone: [phone]  Relation: Daughter  Secondary Emergency Contact: Marina Gruber  Address: [address] [place] [address]  Home Phone: [phone]  Mobile Phone: [phone]  Relation: Daughter    Discharge Plan A: (P) Home Health  Discharge Plan B: (P) Home Health      [facility] - [facility] [address]  [address] [facility] [address]  Phone: [phone] Fax: [phone]    [facility]  [address]  [place] [address]  Phone: [phone] Fax: [phone]      Initial Assessment (most recent)       Adult Discharge Assessment - 05/08/24 1239          Discharge Assessment    Assessment Type Discharge Planning Assessment (P)      Confirmed/corrected address, phone number and insurance Yes (P)      Confirmed Demographics Correct on Facesheet (P)      Source of Information patient (P)      Reason For Admission Acute on chronic diastolic CHF (congestive heart failure) (P)      People in Home alone (P)      Do you expect to return to your current living situation? Yes (P)      Prior to hospitilization cognitive status: Alert/Oriented (P)      Current cognitive status: Alert/Oriented (P)      Equipment Currently Used at Home walker, standard (P)      Readmission within 30 days? No (P)      Patient currently being followed by outpatient case management? No (P)      Do you take  prescription medications? Yes (P)      Do you have prescription coverage? Yes (P)      Do you have any problems affording any of your prescribed medications? TBD (P)      Are you on dialysis? No (P)      Do you take coumadin? No (P)      Discharge Plan A Home Health (P)      Discharge Plan B Home Health (P)                           SW completed Discharge Planning Assessment with patient via bedside. Discharge planning booklet given to patient/family and whiteboard updated with NOHEMI and phone #. All questions answered.    Patient reported that her daughter will provide transportation upon discharge.     Patient reported that she live alone; however, her daughter lives two houses down from her and is able to assist her as needed. Patient reported that prior to hospitalization she was independent with her ADL's. Patient reported she has a walker; however, she does not use it. Patient reported that she is not on dialysis and does not go to a Coumadin clinic.     Patient reported that she is current with [facility].     Patient lives in a [facility] with 0 steps to enter.     Discharge Plan A and Plan B have been determined by review of patient's clinical status, future medical and therapeutic needs, and coverage/benefits for post-acute care in coordination with multidisciplinary team members.      Francy Lu LMSW  [facility]  Ext. [phone]

## 2024-05-08 NOTE — ASSESSMENT & PLAN NOTE
- UA infectious on admission   - Ucx pending   - treat with rocephin   - bladder scan with 500mL, straight cath performed  - monitor patient for continued urinary retention

## 2024-05-08 NOTE — PLAN OF CARE
SW attempted to met with patient at bedside to complete Discharge Planning Assessment; however, patient was sleeping.    SW will attempt to complete again at a later time.      Francy Lu LMSW  [facility]  Ext. [phone]

## 2024-05-08 NOTE — ED NOTES
Nurses Note -- 4 Eyes      5/7/2024   11:12 PM      Skin assessed during: Shift change      [] No Altered Skin Integrity Present    []Prevention Measures Documented      [x] Yes- Altered Skin Integrity Present or Discovered   [] LDA Added if Not in Epic (Describe Wound)   [] New Altered Skin Integrity was Present on Admit and Documented in LDA   [x] Wound Image Taken    Wound Care Consulted? Yes    Attending Nurse:  GINGER Bonilla    Second RN/Staff Member:   GINGER Steven

## 2024-05-08 NOTE — ASSESSMENT & PLAN NOTE
- last echo with EF 60-65%, GII LV DD  - BNP significantly elevated to 1165  - appears overloaded on exam with BLE edema and increased weight gain over the past few days despite increasing her home lasix from daily to BID  - CXR consistent with pulmonary edema and small bilateral pleural effusions   - continue BB  - Monitor on telemetry.   - Patient is on CHF pathway.  Monitor strict Is&Os and daily weights.  Place on fluid restriction of 1.5 L.   - start IV diuresis with lasix 40 mg BID  - repeat echo noted below    Results for orders placed during the hospital encounter of 05/07/24    Echo    Interpretation Summary    Left Ventricle: The left ventricle is normal in size. Ventricular mass is normal. Normal wall thickness. Normal wall motion. There is normal systolic function. Ejection fraction by visual approximation is 55%. Unable to assess diastolic function due to atrial fibrillation.    Right Ventricle: Normal right ventricular cavity size. Wall thickness is normal. Systolic function is mildly reduced.    Aortic Valve: The aortic valve is a trileaflet valve. There is moderate aortic valve sclerosis. There is annular calcification present. Mildly restricted motion. No stenosis.    Tricuspid Valve: There is mild to moderate regurgitation.    Pulmonary Artery: The estimated pulmonary artery systolic pressure is 44 mmHg.    IVC/SVC: Elevated venous pressure at 15 mmHg.

## 2024-05-08 NOTE — ASSESSMENT & PLAN NOTE
- Cr 1.8 on admit, baseline 1.3  - Estimated Creatinine Clearance: 31.2 mL/min (A) (based on SCr of 1.5 mg/dL (H)).  - Likely 2/2 hypervolemia in setting of CHF exacerbation. Should improve with IV diuresis  - renally dose meds  - avoid nephrotoxic insults  - monitor I/Os  - monitor with daily BMP, replete lytes if necessary  - improved to 1.5 this am with diuresis

## 2024-05-09 ENCOUNTER — TELEPHONE (OUTPATIENT)
Dept: CARDIOLOGY | Facility: CLINIC | Age: 79
End: 2024-05-09
Payer: MEDICARE

## 2024-05-09 ENCOUNTER — RESEARCH ENCOUNTER (OUTPATIENT)
Dept: RESEARCH | Facility: HOSPITAL | Age: 79
End: 2024-05-09
Payer: MEDICARE

## 2024-05-09 LAB
ANION GAP SERPL CALC-SCNC: 11 MMOL/L (ref 8–16)
ANION GAP SERPL CALC-SCNC: 8 MMOL/L (ref 8–16)
BASOPHILS # BLD AUTO: 0.06 K/UL (ref 0–0.2)
BASOPHILS NFR BLD: 0.7 % (ref 0–1.9)
BUN SERPL-MCNC: 24 MG/DL (ref 8–23)
BUN SERPL-MCNC: 25 MG/DL (ref 8–23)
CALCIUM SERPL-MCNC: 9.5 MG/DL (ref 8.7–10.5)
CALCIUM SERPL-MCNC: 9.9 MG/DL (ref 8.7–10.5)
CHLORIDE SERPL-SCNC: 102 MMOL/L (ref 95–110)
CHLORIDE SERPL-SCNC: 103 MMOL/L (ref 95–110)
CO2 SERPL-SCNC: 23 MMOL/L (ref 23–29)
CO2 SERPL-SCNC: 27 MMOL/L (ref 23–29)
CREAT SERPL-MCNC: 1.5 MG/DL (ref 0.5–1.4)
CREAT SERPL-MCNC: 1.5 MG/DL (ref 0.5–1.4)
DIFFERENTIAL METHOD BLD: ABNORMAL
EOSINOPHIL # BLD AUTO: 0.3 K/UL (ref 0–0.5)
EOSINOPHIL NFR BLD: 3 % (ref 0–8)
ERYTHROCYTE [DISTWIDTH] IN BLOOD BY AUTOMATED COUNT: 13.8 % (ref 11.5–14.5)
EST. GFR  (NO RACE VARIABLE): 35 ML/MIN/1.73 M^2
EST. GFR  (NO RACE VARIABLE): 35 ML/MIN/1.73 M^2
GLUCOSE SERPL-MCNC: 116 MG/DL (ref 70–110)
GLUCOSE SERPL-MCNC: 82 MG/DL (ref 70–110)
HCT VFR BLD AUTO: 39.7 % (ref 37–48.5)
HGB BLD-MCNC: 12.5 G/DL (ref 12–16)
IMM GRANULOCYTES # BLD AUTO: 0.04 K/UL (ref 0–0.04)
IMM GRANULOCYTES NFR BLD AUTO: 0.5 % (ref 0–0.5)
LYMPHOCYTES # BLD AUTO: 1.8 K/UL (ref 1–4.8)
LYMPHOCYTES NFR BLD: 21 % (ref 18–48)
MAGNESIUM SERPL-MCNC: 2 MG/DL (ref 1.6–2.6)
MAGNESIUM SERPL-MCNC: 2.1 MG/DL (ref 1.6–2.6)
MCH RBC QN AUTO: 30.5 PG (ref 27–31)
MCHC RBC AUTO-ENTMCNC: 31.5 G/DL (ref 32–36)
MCV RBC AUTO: 97 FL (ref 82–98)
MONOCYTES # BLD AUTO: 1.2 K/UL (ref 0.3–1)
MONOCYTES NFR BLD: 13.9 % (ref 4–15)
MRSA ID BY PCR: NEGATIVE
NEUTROPHILS # BLD AUTO: 5.2 K/UL (ref 1.8–7.7)
NEUTROPHILS NFR BLD: 60.9 % (ref 38–73)
NRBC BLD-RTO: 0 /100 WBC
PHOSPHATE SERPL-MCNC: 3 MG/DL (ref 2.7–4.5)
PLATELET # BLD AUTO: 175 K/UL (ref 150–450)
PMV BLD AUTO: 10 FL (ref 9.2–12.9)
POTASSIUM SERPL-SCNC: 3.3 MMOL/L (ref 3.5–5.1)
POTASSIUM SERPL-SCNC: 3.6 MMOL/L (ref 3.5–5.1)
RBC # BLD AUTO: 4.1 M/UL (ref 4–5.4)
SODIUM SERPL-SCNC: 137 MMOL/L (ref 136–145)
SODIUM SERPL-SCNC: 137 MMOL/L (ref 136–145)
STAPH AUREUS ID BY PCR: NEGATIVE
WBC # BLD AUTO: 8.54 K/UL (ref 3.9–12.7)

## 2024-05-09 PROCEDURE — 83735 ASSAY OF MAGNESIUM: CPT | Mod: 91 | Performed by: NURSE PRACTITIONER

## 2024-05-09 PROCEDURE — 83735 ASSAY OF MAGNESIUM: CPT

## 2024-05-09 PROCEDURE — 21400001 HC TELEMETRY ROOM

## 2024-05-09 PROCEDURE — 25000003 PHARM REV CODE 250: Performed by: NURSE PRACTITIONER

## 2024-05-09 PROCEDURE — 80048 BASIC METABOLIC PNL TOTAL CA: CPT | Mod: 91 | Performed by: NURSE PRACTITIONER

## 2024-05-09 PROCEDURE — 84100 ASSAY OF PHOSPHORUS: CPT | Performed by: PHYSICIAN ASSISTANT

## 2024-05-09 PROCEDURE — 36415 COLL VENOUS BLD VENIPUNCTURE: CPT

## 2024-05-09 PROCEDURE — 36415 COLL VENOUS BLD VENIPUNCTURE: CPT | Mod: XB | Performed by: PHYSICIAN ASSISTANT

## 2024-05-09 PROCEDURE — 87150 DNA/RNA AMPLIFIED PROBE: CPT | Performed by: PHYSICIAN ASSISTANT

## 2024-05-09 PROCEDURE — 25000242 PHARM REV CODE 250 ALT 637 W/ HCPCS

## 2024-05-09 PROCEDURE — 25000003 PHARM REV CODE 250: Performed by: PHYSICIAN ASSISTANT

## 2024-05-09 PROCEDURE — 25000003 PHARM REV CODE 250: Performed by: HOSPITALIST

## 2024-05-09 PROCEDURE — 63600175 PHARM REV CODE 636 W HCPCS: Performed by: HOSPITALIST

## 2024-05-09 PROCEDURE — 94761 N-INVAS EAR/PLS OXIMETRY MLT: CPT

## 2024-05-09 PROCEDURE — 25000003 PHARM REV CODE 250

## 2024-05-09 PROCEDURE — 36415 COLL VENOUS BLD VENIPUNCTURE: CPT | Mod: XB | Performed by: NURSE PRACTITIONER

## 2024-05-09 PROCEDURE — 87040 BLOOD CULTURE FOR BACTERIA: CPT

## 2024-05-09 PROCEDURE — 94640 AIRWAY INHALATION TREATMENT: CPT

## 2024-05-09 PROCEDURE — 85025 COMPLETE CBC W/AUTO DIFF WBC: CPT | Performed by: PHYSICIAN ASSISTANT

## 2024-05-09 PROCEDURE — 63600175 PHARM REV CODE 636 W HCPCS

## 2024-05-09 PROCEDURE — 80048 BASIC METABOLIC PNL TOTAL CA: CPT | Performed by: PHYSICIAN ASSISTANT

## 2024-05-09 RX ORDER — FUROSEMIDE 10 MG/ML
80 INJECTION INTRAMUSCULAR; INTRAVENOUS 2 TIMES DAILY
Status: DISCONTINUED | OUTPATIENT
Start: 2024-05-09 | End: 2024-05-09

## 2024-05-09 RX ORDER — FLUTICASONE FUROATE AND VILANTEROL 100; 25 UG/1; UG/1
1 POWDER RESPIRATORY (INHALATION) DAILY
Status: DISCONTINUED | OUTPATIENT
Start: 2024-05-09 | End: 2024-05-22 | Stop reason: HOSPADM

## 2024-05-09 RX ORDER — FUROSEMIDE 10 MG/ML
80 INJECTION INTRAMUSCULAR; INTRAVENOUS 2 TIMES DAILY
Status: DISCONTINUED | OUTPATIENT
Start: 2024-05-09 | End: 2024-05-11

## 2024-05-09 RX ORDER — MICONAZOLE NITRATE 2 %
POWDER (GRAM) TOPICAL 2 TIMES DAILY
Status: DISCONTINUED | OUTPATIENT
Start: 2024-05-09 | End: 2024-05-22 | Stop reason: HOSPADM

## 2024-05-09 RX ORDER — FUROSEMIDE 10 MG/ML
60 INJECTION INTRAMUSCULAR; INTRAVENOUS 2 TIMES DAILY
Status: DISCONTINUED | OUTPATIENT
Start: 2024-05-09 | End: 2024-05-09

## 2024-05-09 RX ADMIN — ASPIRIN 81 MG: 81 TABLET, COATED ORAL at 09:05

## 2024-05-09 RX ADMIN — MICONAZOLE NITRATE 2 % TOPICAL POWDER: at 10:05

## 2024-05-09 RX ADMIN — APIXABAN 2.5 MG: 2.5 TABLET, FILM COATED ORAL at 10:05

## 2024-05-09 RX ADMIN — ATORVASTATIN CALCIUM 20 MG: 20 TABLET, FILM COATED ORAL at 09:05

## 2024-05-09 RX ADMIN — POTASSIUM BICARBONATE 50 MEQ: 978 TABLET, EFFERVESCENT ORAL at 10:05

## 2024-05-09 RX ADMIN — VANCOMYCIN HYDROCHLORIDE 1750 MG: 500 INJECTION, POWDER, LYOPHILIZED, FOR SOLUTION INTRAVENOUS at 11:05

## 2024-05-09 RX ADMIN — METOPROLOL SUCCINATE 100 MG: 100 TABLET, EXTENDED RELEASE ORAL at 09:05

## 2024-05-09 RX ADMIN — FLUTICASONE FUROATE AND VILANTEROL TRIFENATATE 1 PUFF: 100; 25 POWDER RESPIRATORY (INHALATION) at 03:05

## 2024-05-09 RX ADMIN — FUROSEMIDE 80 MG: 10 INJECTION, SOLUTION INTRAVENOUS at 05:05

## 2024-05-09 RX ADMIN — APIXABAN 2.5 MG: 2.5 TABLET, FILM COATED ORAL at 09:05

## 2024-05-09 RX ADMIN — FUROSEMIDE 80 MG: 10 INJECTION, SOLUTION INTRAVENOUS at 10:05

## 2024-05-09 NOTE — PROGRESS NOTES
[facility] - Observation [facility]  Wound Care    Patient Name:  Misa Barajas   MRN:  [id number]  Date: 2024  Diagnosis: Acute on chronic diastolic CHF (congestive heart failure)    History:     Past Medical History:   Diagnosis Date    Acute hypoxemic respiratory failure 2021    Acute superficial venous thrombosis of lower extremity, bilateral 2022    Aortic atherosclerosis     Arthritis     knee joint pain    Asthma-COPD overlap syndrome 2022    Breast cancer     left breast & lymph nodes-s/p sx with chemo    Bronchitis     with flu-2014    Cataracts, bilateral     Chronic anticoagulation 2022    Chronic diastolic heart failure 2019    Chronic kidney disease, stage 3     History of chemotherapy     last treatment 2002 (had 8 treatments)    Hyperlipidemia 2016    Hypertension     Lymphedema of both lower extremities 2022    Nephrolithiasis 2014    Osteoporosis     Paroxysmal atrial fibrillation 2021    Renal calculi     hematuria    Renal osteodystrophy 2016    Venous stasis dermatitis of both lower extremities 2022    Vitamin D insufficiency        Social History     Socioeconomic History    Marital status:    Tobacco Use    Smoking status: Former     Current packs/day: 0.00     Average packs/day: 1 pack/day for 50.0 years (50.0 ttl pk-yrs)     Types: Cigarettes     Start date:      Quit date: 2009     Years since quittin.9    Smokeless tobacco: Never   Substance and Sexual Activity    Alcohol use: No    Drug use: No    Sexual activity: Not Currently     Partners: Male     Birth control/protection: Partner-Vasectomy     Social Determinants of Health     Financial Resource Strain: Low Risk  (3/3/2024)    Overall Financial Resource Strain (CARDIA)     Difficulty of Paying Living Expenses: Not very hard   Food Insecurity: No Food Insecurity (3/3/2024)    Hunger Vital Sign     Worried About Running Out of Food in the Last Year: Never true  Please fax external lab orders to [facility] in [place], and contact patient to arrange a f/u visit with me in 5 weeks (virtual visit okay). Thank you!     Ran Out of Food in the Last Year: Never true   Transportation Needs: No Transportation Needs (3/3/2024)    PRAPARE - Transportation     Lack of Transportation (Medical): No     Lack of Transportation (Non-Medical): No   Physical Activity: Inactive (3/3/2024)    Exercise Vital Sign     Days of Exercise per Week: 0 days     Minutes of Exercise per Session: 0 min   Stress: No Stress Concern Present (3/3/2024)    [facility] - Occupational Stress Questionnaire     Feeling of Stress : Not at all   Housing Stability: Low Risk  (3/3/2024)    Housing Stability Vital Sign     Unable to Pay for Housing in the Last Year: No     Number of Places Lived in the Last Year: 1     Unstable Housing in the Last Year: No       Precautions:     Allergies as of 05/07/2024 - Reviewed 05/07/2024   Allergen Reaction Noted    Adhesive Rash 05/30/2014       [facility] Assessment Details/Treatment   Patient seen for wound care consultation.   Reviewed chart for this encounter.   See Flow Sheet for findings.    Pt in bed and agreeable to care. The right breast fold and abdominal folds are intact, pink, and moist with fungal appearance. Miconazole powder applied. Ruptured blister to the right shin with a slough covered wound bed. The wound was cleansed with SN and a mepilex ag foam dressing applied. Pt educated on the wound care and the importance of turning every 2 hours.     RECOMMENDATIONS:  - Skin folds: bedside nursing to cleanse with soap and water, pat dry and apply miconazole powder bid/prn  - Right shin blister: bedside nursing to cleanse with NS, pat dry and apply a mepilex ag foam dressing every 3 day   - Turning every 2 hours  - Bedside nursing to maintain pressure injury prevention interventions     05/09/24 1131        Wound 05/08/24 0026 Intertrigo Right Breast   Date First Assessed/Time First Assessed: 05/08/24 0026   Primary Wound Type: Intertrigo  Side: Right  Location: Breast   Dressing Appearance Open  to air   Drainage Amount None   Appearance Intact;Pink;Moist;Other (see comments)  (fungal appearance)   Periwound Area Intact;Dry   Care Cleansed with:;Soap and water   Dressing Applied;Other (comment)  (miconazole powder)        Wound 05/08/24 0028 Intertrigo lower Abdomen   Date First Assessed/Time First Assessed: 05/08/24 0028   Primary Wound Type: Intertrigo  Orientation: lower  Location: Abdomen   Dressing Appearance Open to air   Drainage Amount None   Appearance Intact;Pink;Moist;Other (see comments)  (fungal appearance)   Periwound Area Intact;Dry   Care Cleansed with:;Soap and water   Dressing Applied;Other (comment)  (miconazole powder)        Wound 05/08/24 0031 Blister(s) Right anterior;lower Leg   Date First Assessed/Time First Assessed: 05/08/24 0031   Primary Wound Type: Blister(s)  Side: Right  Orientation: anterior;lower  Location: Leg   Wound Image    Dressing Appearance Open to air   Drainage Amount None   Appearance Intact;Pink;Slough;Moist   Periwound Area Intact;Dry   Care Cleansed with:;Sterile normal saline   Dressing Applied;Silver;Foam     Recommendations made to primary team for above plan via secure chat. Wound care will follow-up as needed.   05/09/2024

## 2024-05-09 NOTE — ASSESSMENT & PLAN NOTE
- UA infectious on admission   - Ucx currently growing staph aureus  - pending susceptibilities of culture  - switched IV rocephin to IV vanc  - bladder scan with 500mL, straight cath performed  - monitor patient for continued urinary retention

## 2024-05-09 NOTE — PROGRESS NOTES
Pharmacokinetic Initial Assessment: IV Vancomycin    Assessment/Plan:    Initiate intravenous vancomycin with loading dose of 1750 mg once with subsequent doses when random concentrations are less than 20 mcg/mL  Desired empiric serum trough concentration is 10 to 20 mcg/mL  Draw vancomycin random level 24 hours after initial load. Re-dose if level is < 20. Pharmacy will continue to follow and monitor vancomycin.      Please contact pharmacy at extension [phone] with any questions regarding this assessment.     Thank you for the consult,   Taz Mcmanus       Patient brief summary:  Misa Barajas is a 78 y.o. female initiated on antimicrobial therapy with IV Vancomycin for treatment of suspected urinary tract infection    Drug Allergies:   Review of patient's allergies indicates:   Allergen Reactions    Adhesive Rash     Pt states she removed a LATEX bandaid and the skin beneath was swollen and red. No other latex causes a reaction.       Actual Body Weight:   83kg    Renal Function:   Estimated Creatinine Clearance: 31.2 mL/min (A) (based on SCr of 1.5 mg/dL (H)).,     Dialysis Method (if applicable):  N/A

## 2024-05-09 NOTE — ASSESSMENT & PLAN NOTE
- controlled on admit  - continue toprol 100 mg and losartan 50 mg daily  - IV diuresis as above   Patient tolerates NS bolus well. NS now running @ 19ml/hr to keep pt vein open. Dobutamine continues to run @ 5ml/hr. Patient HR 74bpm, BP 99/47, SpO2 95% on RA, and RR 18. Patient no longer c/o SOB.

## 2024-05-09 NOTE — NURSING
Pt is AAOx4. Table no complaints of any. Pt edema to BLE discoloration. No distress noted. Left breast mastectomy. Call light in reach. Bed in low locked position.   Side rails x2. Belongings at bedside.   Pt free of fall and injuries Questions and concerns voiced and answered.    Plan of care continues.

## 2024-05-09 NOTE — PROGRESS NOTES
5/8/2024 2:45 PM: Met with patient and patient's daughters regarding participation in the [id number] research trial. Briefly reviewed the study purpose, testing requirements, and follow-up associated with participation. Explained that patient meets criteria for the trial and could be enrolled during her current hospitalization if she is interested in participation. Answered all questions to patient's and patient's daughters' satisfaction and gave patient a copy of the consent for review as well as my contact information. Patient and patient's daughters to review the information prior to making a decision. CRC to follow-up in the morning regarding participation. Patient agreeable with plan.     5/9/2024 8:30 AM: Met with patient as discussed regarding study participation. Patient and patient's daughters had transportation concerns regarding the frequent outpatient PT visits, as patient does not drive and both daughters work during the day. CRC had discussed a ride share option for transportation if patient was comfortable with this service. Despite this option, patient declined participation in the [id number] trial at this time. Understanding verbalized. Patient thanked for her time and consideration.

## 2024-05-09 NOTE — PROGRESS NOTES
[facility] - Observation [address] [place] [address] Medicine  Progress Note    Patient Name: Misa Barajas  MRN: [id number]  Patient Class: IP- Inpatient   Admission Date: 5/7/2024  Length of Stay: 1 days  Attending Physician: Fiorella Cobian MD  Primary Care Provider: Celia Carlisle MD        Subjective:     Principal Problem:Acute on chronic diastolic CHF (congestive heart failure)        HPI:  Misa Barajas is a 78 y.o. female with a PMHx of HTN, HLD, HFpEF, COPD on home O2 (2L NC baseline O2 sat 92%) and pAF presentswith increasing LE swelling.  She is gained 5 lb in the past 3 days despite increased Lasix use. She also endorses new redness and warmth with associated itching to her RLE. She has small pustules that she noticed to RLE a few days ago.  Denies fever, chills, chest pain, cough, SOB, abdominal pain, n/v/d, dysuria, headaches. Has chronic discoloration to BLEs without recent worsening.     ED: Afebrile . Hr 90s. Satting well on home 2L NC. CBC without leukocytosis or anemia. CMP notable for small MARQUEZ with Cr 1.8 (baseline 1.3) and BUN 26. BNP 1165. Trop 0.032 (0.028 at baseline). Lactic 1.2. CXR with Cardiomegaly with pulmonary vascular congestion and small bilateral pleural effusions, suggestive of pulmonary edema secondary to CHF. Given rocephin, toprol 100 mg and IV lasix 60 mg. Placed in observation for CHF exacerbation.     Overview/Hospital Course:  Misa Barajas is a 78 y.o. F who was admitted to  for further evaluation of acute on chronic diastolic CHF. Diuresing on IV lasix. Echo pending. Cr 1.8 on admission most likely 2/2 to fluid overloaded, improving. UA infectious, cx growing staph aureus. Monitor susceptibilities. IV rocephin switched to IV vancomycin.     Interval History: Patient seen and assessed at bedside. Afebrile, VSS on home 2L via NC. -1.2L over last 24 hours. Increased lasix to 80mg IV BID. Ucx currently growing staph aureus. Switch abx to IV vancomycin.       Review of Systems    Constitutional:  Negative for activity change, chills and fever.   HENT:  Negative for trouble swallowing.    Eyes:  Negative for photophobia and visual disturbance.   Respiratory:  Positive for shortness of breath (improving). Negative for cough and chest tightness.    Cardiovascular:  Positive for leg swelling. Negative for chest pain and palpitations.   Gastrointestinal:  Negative for abdominal pain, constipation, diarrhea, nausea and vomiting.   Genitourinary:  Negative for dysuria, frequency and hematuria.   Musculoskeletal:  Negative for back pain, gait problem and neck pain.   Skin:  Positive for color change. Negative for rash and wound.   Neurological:  Negative for dizziness, syncope, speech difficulty and light-headedness.   Psychiatric/Behavioral:  Negative for agitation and confusion. The patient is not nervous/anxious.      Objective:     Vital Signs (Most Recent):  Temp: 97.3 °F (36.3 °C) (05/09/24 0806)  Pulse: 91 (05/09/24 0806)  Resp: 18 (05/09/24 0806)  BP: 123/62 (05/09/24 0806)  SpO2: (!) 93 % (05/09/24 0806) Vital Signs (24h Range):  Temp:  [97.3 °F (36.3 °C)-97.9 °F (36.6 °C)] 97.3 °F (36.3 °C)  Pulse:  [] 91  Resp:  [16-18] 18  SpO2:  [93 %-98 %] 93 %  BP: (116-128)/(59-76) 123/62     Weight: 83 kg (182 lb 15.7 oz)  Body mass index is 31.41 kg/m².    Intake/Output Summary (Last 24 hours) at 5/9/2024 1048  Last data filed at 5/9/2024 0058  Gross per 24 hour   Intake --   Output 791 ml   Net -791 ml         Physical Exam  Vitals and nursing note reviewed.   Constitutional:       General: She is not in acute distress.     Appearance: She is well-developed.   HENT:      Head: Normocephalic and atraumatic.      Mouth/Throat:      Pharynx: No oropharyngeal exudate.   Eyes:      General: No scleral icterus.     Conjunctiva/sclera: Conjunctivae normal.   Cardiovascular:      Rate and Rhythm: Normal rate and regular rhythm.      Heart sounds: Normal heart sounds.   Pulmonary:      Effort:  Pulmonary effort is normal. No respiratory distress.      Breath sounds: Rales (minimal in bilateral lower lobes, improved from yesterday) present. No wheezing.   Abdominal:      General: Bowel sounds are normal. There is no distension.      Palpations: Abdomen is soft.      Tenderness: There is no abdominal tenderness.   Musculoskeletal:         General: No tenderness. Normal range of motion.      Cervical back: Normal range of motion and neck supple.      Right lower le+ Pitting Edema present.      Left lower le+ Pitting Edema present.   Lymphadenopathy:      Cervical: No cervical adenopathy.   Skin:     General: Skin is warm and dry.      Capillary Refill: Capillary refill takes less than 2 seconds.      Findings: Erythema present. No rash.      Comments: see pictures below   Neurological:      Mental Status: She is alert and oriented to person, place, and time.      Cranial Nerves: No cranial nerve deficit.      Sensory: No sensory deficit.      Coordination: Coordination normal.   Psychiatric:         Behavior: Behavior normal.         Thought Content: Thought content normal.         Judgment: Judgment normal.                   Significant Labs: All pertinent labs within the past 24 hours have been reviewed.    Significant Imaging: I have reviewed all pertinent imaging results/findings within the past 24 hours.    Assessment/Plan:      * Acute on chronic diastolic CHF (congestive heart failure)  - last echo with EF 60-65%, GII LV DD  - BNP significantly elevated to 1165  - appears overloaded on exam with BLE edema and increased weight gain over the past few days despite increasing her home lasix from daily to BID  - CXR consistent with pulmonary edema and small bilateral pleural effusions   - continue BB  - Monitor on telemetry.   - Patient is on CHF pathway.  Monitor strict Is&Os and daily weights.  Place on fluid restriction of 1.5 L.   - start IV diuresis with lasix 40 mg BID, increased to 80mg IV  BID  - goal UOP of -2L per day   - repeat echo noted below    Results for orders placed during the hospital encounter of 05/07/24    Echo    Interpretation Summary    Left Ventricle: The left ventricle is normal in size. Ventricular mass is normal. Normal wall thickness. Normal wall motion. There is normal systolic function. Ejection fraction by visual approximation is 55%. Unable to assess diastolic function due to atrial fibrillation.    Right Ventricle: Normal right ventricular cavity size. Wall thickness is normal. Systolic function is mildly reduced.    Aortic Valve: The aortic valve is a trileaflet valve. There is moderate aortic valve sclerosis. There is annular calcification present. Mildly restricted motion. No stenosis.    Tricuspid Valve: There is mild to moderate regurgitation.    Pulmonary Artery: The estimated pulmonary artery systolic pressure is 44 mmHg.    IVC/SVC: Elevated venous pressure at 15 mmHg.    Acute cystitis  - UA infectious on admission   - Ucx currently growing staph aureus  - pending susceptibilities of culture  - switched IV rocephin to IV vanc  - bladder scan with 500mL, straight cath performed  - monitor patient for continued urinary retention      Cellulitis of right lower extremity  - afebrile without leukocytosis  - start IV rocephin 1g q24hr, switched to IV vanc  - blood cultures pending  - wound care consulted    Elevated troponin  - trop 0.032 on admit, baseline ~0.028  - patient denies chest pain  - likely elevated 2/2 CHF exacerbation  - repeat trop pending --> at baseline  - monitor tele     Acute renal failure superimposed on stage 3a chronic kidney disease  - Cr 1.8 on admit, baseline 1.3  - Estimated Creatinine Clearance: 31.2 mL/min (A) (based on SCr of 1.5 mg/dL (H)).  - Likely 2/2 hypervolemia in setting of CHF exacerbation. Should improve with IV diuresis  - renally dose meds  - avoid nephrotoxic insults  - monitor I/Os  - monitor with daily BMP, replete lytes if  necessary  - improved to 1.5 this am with diuresis    Chronic respiratory failure with hypoxia  - chronic and stable on home 2L    Centrilobular emphysema  Patient's COPD is controlled currently.  Patient is currently off COPD Pathway. Continue scheduled inhalers Supplemental oxygen and monitor respiratory status closely.   - satting well on home 2L NC    Hyperlipidemia  - continue statin     Paroxysmal atrial fibrillation  Patient with Paroxysmal (<7 days) atrial fibrillation which is controlled currently with Beta Blocker. Patient is currently in atrial fibrillation.[id number] Score: 3. Anticoagulation indicated. Anticoagulation done with eliquis 5 mg BID  .    Essential hypertension  - controlled on admit  - continue toprol 100 mg and losartan 50 mg daily  - IV diuresis as above      VTE Risk Mitigation (From admission, onward)           Ordered     apixaban tablet 2.5 mg  2 times daily         05/08/24 0050     IP VTE HIGH RISK PATIENT  Once         05/07/24 2029     Reason for No Pharmacological VTE Prophylaxis  Once        Question:  Reasons:  Answer:  Already adequately anticoagulated on oral Anticoagulants    05/07/24 2029     Place sequential compression device  Until discontinued         05/07/24 2024                    Discharge Planning   NOHEMI: 5/10/2024     Code Status: Full Code   Is the patient medically ready for discharge?:     Reason for patient still in hospital (select all that apply): Patient trending condition, Laboratory test, and Treatment  Discharge Plan A: Home Health                  Adina Wolff PA-C  Department of Hospital Medicine   [facility] - [facility]

## 2024-05-09 NOTE — SUBJECTIVE & OBJECTIVE
Interval History: Patient seen and assessed at bedside. Afebrile, VSS on home 2L via NC. -1.2L over last 24 hours. Increased lasix to 80mg IV BID. Ucx currently growing staph aureus. Switch abx to IV vancomycin.       Review of Systems   Constitutional:  Negative for activity change, chills and fever.   HENT:  Negative for trouble swallowing.    Eyes:  Negative for photophobia and visual disturbance.   Respiratory:  Positive for shortness of breath (improving). Negative for cough and chest tightness.    Cardiovascular:  Positive for leg swelling. Negative for chest pain and palpitations.   Gastrointestinal:  Negative for abdominal pain, constipation, diarrhea, nausea and vomiting.   Genitourinary:  Negative for dysuria, frequency and hematuria.   Musculoskeletal:  Negative for back pain, gait problem and neck pain.   Skin:  Positive for color change. Negative for rash and wound.   Neurological:  Negative for dizziness, syncope, speech difficulty and light-headedness.   Psychiatric/Behavioral:  Negative for agitation and confusion. The patient is not nervous/anxious.      Objective:     Vital Signs (Most Recent):  Temp: 97.3 °F (36.3 °C) (05/09/24 0806)  Pulse: 91 (05/09/24 0806)  Resp: 18 (05/09/24 0806)  BP: 123/62 (05/09/24 0806)  SpO2: (!) 93 % (05/09/24 0806) Vital Signs (24h Range):  Temp:  [97.3 °F (36.3 °C)-97.9 °F (36.6 °C)] 97.3 °F (36.3 °C)  Pulse:  [] 91  Resp:  [16-18] 18  SpO2:  [93 %-98 %] 93 %  BP: (116-128)/(59-76) 123/62     Weight: 83 kg (182 lb 15.7 oz)  Body mass index is 31.41 kg/m².    Intake/Output Summary (Last 24 hours) at 5/9/2024 1048  Last data filed at 5/9/2024 0058  Gross per 24 hour   Intake --   Output 791 ml   Net -791 ml         Physical Exam  Vitals and nursing note reviewed.   Constitutional:       General: She is not in acute distress.     Appearance: She is well-developed.   HENT:      Head: Normocephalic and atraumatic.      Mouth/Throat:      Pharynx: No oropharyngeal  exudate.   Eyes:      General: No scleral icterus.     Conjunctiva/sclera: Conjunctivae normal.   Cardiovascular:      Rate and Rhythm: Normal rate and regular rhythm.      Heart sounds: Normal heart sounds.   Pulmonary:      Effort: Pulmonary effort is normal. No respiratory distress.      Breath sounds: Rales (minimal in bilateral lower lobes, improved from yesterday) present. No wheezing.   Abdominal:      General: Bowel sounds are normal. There is no distension.      Palpations: Abdomen is soft.      Tenderness: There is no abdominal tenderness.   Musculoskeletal:         General: No tenderness. Normal range of motion.      Cervical back: Normal range of motion and neck supple.      Right lower le+ Pitting Edema present.      Left lower le+ Pitting Edema present.   Lymphadenopathy:      Cervical: No cervical adenopathy.   Skin:     General: Skin is warm and dry.      Capillary Refill: Capillary refill takes less than 2 seconds.      Findings: Erythema present. No rash.      Comments: see pictures below   Neurological:      Mental Status: She is alert and oriented to person, place, and time.      Cranial Nerves: No cranial nerve deficit.      Sensory: No sensory deficit.      Coordination: Coordination normal.   Psychiatric:         Behavior: Behavior normal.         Thought Content: Thought content normal.         Judgment: Judgment normal.                   Significant Labs: All pertinent labs within the past 24 hours have been reviewed.    Significant Imaging: I have reviewed all pertinent imaging results/findings within the past 24 hours.

## 2024-05-09 NOTE — PLAN OF CARE
Problem: Adult Inpatient Plan of Care  Goal: Plan of Care Review  Outcome: Progressing  Goal: Patient-Specific Goal (Individualized)  Outcome: Progressing  Goal: Absence of Hospital-Acquired Illness or Injury  Outcome: Progressing  Goal: Optimal Comfort and Wellbeing  Outcome: Progressing  Goal: Readiness for Transition of Care  Outcome: Progressing     Problem: Acute Kidney Injury/Impairment  Goal: Fluid and Electrolyte Balance  Outcome: Progressing  Goal: Improved Oral Intake  Outcome: Progressing  Goal: Effective Renal Function  Outcome: Progressing     Problem: Infection  Goal: Absence of Infection Signs and Symptoms  Outcome: Progressing     Problem: Wound  Goal: Optimal Coping  Outcome: Progressing  Goal: Optimal Functional Ability  Outcome: Progressing  Goal: Absence of Infection Signs and Symptoms  Outcome: Progressing  Goal: Improved Oral Intake  Outcome: Progressing  Goal: Optimal Pain Control and Function  Outcome: Progressing  Goal: Skin Health and Integrity  Outcome: Progressing  Goal: Optimal Wound Healing  Outcome: Progressing     Problem: Fall Injury Risk  Goal: Absence of Fall and Fall-Related Injury  Outcome: Progressing     Problem: Skin Injury Risk Increased  Goal: Skin Health and Integrity  Outcome: Progressing     Pt progressing towards goals. No distress notice. No falls or injuries during shift. Bed in lowest position, call light within reach, belonging at bedside. Safety precaution maintain.Plan of Care reviewed. Pt verbalized understanding.

## 2024-05-09 NOTE — ASSESSMENT & PLAN NOTE
- last echo with EF 60-65%, GII LV DD  - BNP significantly elevated to 1165  - appears overloaded on exam with BLE edema and increased weight gain over the past few days despite increasing her home lasix from daily to BID  - CXR consistent with pulmonary edema and small bilateral pleural effusions   - continue BB  - Monitor on telemetry.   - Patient is on CHF pathway.  Monitor strict Is&Os and daily weights.  Place on fluid restriction of 1.5 L.   - start IV diuresis with lasix 40 mg BID, increased to 80mg IV BID  - goal UOP of -2L per day   - repeat echo noted below    Results for orders placed during the hospital encounter of 05/07/24    Echo    Interpretation Summary    Left Ventricle: The left ventricle is normal in size. Ventricular mass is normal. Normal wall thickness. Normal wall motion. There is normal systolic function. Ejection fraction by visual approximation is 55%. Unable to assess diastolic function due to atrial fibrillation.    Right Ventricle: Normal right ventricular cavity size. Wall thickness is normal. Systolic function is mildly reduced.    Aortic Valve: The aortic valve is a trileaflet valve. There is moderate aortic valve sclerosis. There is annular calcification present. Mildly restricted motion. No stenosis.    Tricuspid Valve: There is mild to moderate regurgitation.    Pulmonary Artery: The estimated pulmonary artery systolic pressure is 44 mmHg.    IVC/SVC: Elevated venous pressure at 15 mmHg.

## 2024-05-09 NOTE — PROGRESS NOTES
[facility] ([facility]) Team notified of pt referral via [facility] (automated inbasket notification) .    Patient screened today 5-8-2024 by provider and RN for enrollment to program.      Pt was deemed not a candidate for enrollment at this time related to patient refused.    Pt will require additional follow up planning per primary team.     If pt status, diagnosis, or treatment plan changes , please place AMB referral to [facility] ([id number]) for H[facility] enrollment re-evalution.     At least 15 minutes spent doing the Hospital Education with this patient and her daughter. Daughter states Chema Hurdbaldev  Nurse Colton told patient that he would like to see her enroll in our HFT[facility] and asks if that is our Clinic. Even though I explain yes it is patient still refuses to enroll.

## 2024-05-09 NOTE — ASSESSMENT & PLAN NOTE
Patient with Paroxysmal (<7 days) atrial fibrillation which is controlled currently with Beta Blocker. Patient is currently in atrial fibrillation.[id number] Score: 3. Anticoagulation indicated. Anticoagulation done with eliquis 5 mg BID  .

## 2024-05-09 NOTE — ASSESSMENT & PLAN NOTE
- afebrile without leukocytosis  - start IV rocephin 1g q24hr, switched to IV vanc  - blood cultures pending  - wound care consulted

## 2024-05-10 PROBLEM — R33.9 URINARY RETENTION: Status: ACTIVE | Noted: 2024-05-10

## 2024-05-10 PROBLEM — E83.39 HYPOPHOSPHATEMIA: Status: ACTIVE | Noted: 2024-05-10

## 2024-05-10 LAB
ANION GAP SERPL CALC-SCNC: 9 MMOL/L (ref 8–16)
BACTERIA UR CULT: ABNORMAL
BACTERIA UR CULT: ABNORMAL
BASOPHILS # BLD AUTO: 0.06 K/UL (ref 0–0.2)
BASOPHILS NFR BLD: 0.6 % (ref 0–1.9)
BUN SERPL-MCNC: 23 MG/DL (ref 8–23)
CALCIUM SERPL-MCNC: 9.6 MG/DL (ref 8.7–10.5)
CHLORIDE SERPL-SCNC: 100 MMOL/L (ref 95–110)
CO2 SERPL-SCNC: 28 MMOL/L (ref 23–29)
CREAT SERPL-MCNC: 1.2 MG/DL (ref 0.5–1.4)
DIFFERENTIAL METHOD BLD: ABNORMAL
EOSINOPHIL # BLD AUTO: 0.2 K/UL (ref 0–0.5)
EOSINOPHIL NFR BLD: 1.8 % (ref 0–8)
ERYTHROCYTE [DISTWIDTH] IN BLOOD BY AUTOMATED COUNT: 13.8 % (ref 11.5–14.5)
EST. GFR  (NO RACE VARIABLE): 45.8 ML/MIN/1.73 M^2
GLUCOSE SERPL-MCNC: 97 MG/DL (ref 70–110)
HCT VFR BLD AUTO: 40 % (ref 37–48.5)
HGB BLD-MCNC: 12.6 G/DL (ref 12–16)
IMM GRANULOCYTES # BLD AUTO: 0.04 K/UL (ref 0–0.04)
IMM GRANULOCYTES NFR BLD AUTO: 0.4 % (ref 0–0.5)
LYMPHOCYTES # BLD AUTO: 1.3 K/UL (ref 1–4.8)
LYMPHOCYTES NFR BLD: 12.5 % (ref 18–48)
MAGNESIUM SERPL-MCNC: 1.9 MG/DL (ref 1.6–2.6)
MCH RBC QN AUTO: 29.6 PG (ref 27–31)
MCHC RBC AUTO-ENTMCNC: 31.5 G/DL (ref 32–36)
MCV RBC AUTO: 94 FL (ref 82–98)
MONOCYTES # BLD AUTO: 1.4 K/UL (ref 0.3–1)
MONOCYTES NFR BLD: 14 % (ref 4–15)
NEUTROPHILS # BLD AUTO: 7.1 K/UL (ref 1.8–7.7)
NEUTROPHILS NFR BLD: 70.7 % (ref 38–73)
NRBC BLD-RTO: 0 /100 WBC
PHOSPHATE SERPL-MCNC: 2.5 MG/DL (ref 2.7–4.5)
PLATELET # BLD AUTO: 167 K/UL (ref 150–450)
PMV BLD AUTO: 10.4 FL (ref 9.2–12.9)
POCT GLUCOSE: 99 MG/DL (ref 70–110)
POTASSIUM SERPL-SCNC: 3.4 MMOL/L (ref 3.5–5.1)
RBC # BLD AUTO: 4.26 M/UL (ref 4–5.4)
SODIUM SERPL-SCNC: 137 MMOL/L (ref 136–145)
VANCOMYCIN SERPL-MCNC: 16.9 UG/ML
WBC # BLD AUTO: 10.08 K/UL (ref 3.9–12.7)

## 2024-05-10 PROCEDURE — 63600175 PHARM REV CODE 636 W HCPCS

## 2024-05-10 PROCEDURE — 85025 COMPLETE CBC W/AUTO DIFF WBC: CPT | Performed by: PHYSICIAN ASSISTANT

## 2024-05-10 PROCEDURE — 80048 BASIC METABOLIC PNL TOTAL CA: CPT | Performed by: PHYSICIAN ASSISTANT

## 2024-05-10 PROCEDURE — 84100 ASSAY OF PHOSPHORUS: CPT | Performed by: PHYSICIAN ASSISTANT

## 2024-05-10 PROCEDURE — 36415 COLL VENOUS BLD VENIPUNCTURE: CPT | Performed by: PHYSICIAN ASSISTANT

## 2024-05-10 PROCEDURE — 93005 ELECTROCARDIOGRAM TRACING: CPT

## 2024-05-10 PROCEDURE — 25000003 PHARM REV CODE 250

## 2024-05-10 PROCEDURE — 36415 COLL VENOUS BLD VENIPUNCTURE: CPT | Performed by: HOSPITALIST

## 2024-05-10 PROCEDURE — 25000003 PHARM REV CODE 250: Performed by: NURSE PRACTITIONER

## 2024-05-10 PROCEDURE — 80202 ASSAY OF VANCOMYCIN: CPT | Performed by: HOSPITALIST

## 2024-05-10 PROCEDURE — 93010 ELECTROCARDIOGRAM REPORT: CPT | Mod: ,,, | Performed by: INTERNAL MEDICINE

## 2024-05-10 PROCEDURE — 99900035 HC TECH TIME PER 15 MIN (STAT)

## 2024-05-10 PROCEDURE — 21400001 HC TELEMETRY ROOM

## 2024-05-10 PROCEDURE — 94640 AIRWAY INHALATION TREATMENT: CPT

## 2024-05-10 PROCEDURE — 25000003 PHARM REV CODE 250: Performed by: PHYSICIAN ASSISTANT

## 2024-05-10 PROCEDURE — 94761 N-INVAS EAR/PLS OXIMETRY MLT: CPT

## 2024-05-10 PROCEDURE — 27000221 HC OXYGEN, UP TO 24 HOURS

## 2024-05-10 PROCEDURE — 83735 ASSAY OF MAGNESIUM: CPT

## 2024-05-10 RX ORDER — TAMSULOSIN HYDROCHLORIDE 0.4 MG/1
0.4 CAPSULE ORAL DAILY
Status: DISCONTINUED | OUTPATIENT
Start: 2024-05-10 | End: 2024-05-22 | Stop reason: HOSPADM

## 2024-05-10 RX ORDER — AMOXICILLIN AND CLAVULANATE POTASSIUM 875; 125 MG/1; MG/1
1 TABLET, FILM COATED ORAL EVERY 12 HOURS
Status: DISCONTINUED | OUTPATIENT
Start: 2024-05-10 | End: 2024-05-12

## 2024-05-10 RX ORDER — METHOCARBAMOL 500 MG/1
500 TABLET, FILM COATED ORAL 4 TIMES DAILY PRN
Status: DISCONTINUED | OUTPATIENT
Start: 2024-05-10 | End: 2024-05-22 | Stop reason: HOSPADM

## 2024-05-10 RX ORDER — POLYETHYLENE GLYCOL 3350 17 G/17G
17 POWDER, FOR SOLUTION ORAL DAILY
Status: DISCONTINUED | OUTPATIENT
Start: 2024-05-10 | End: 2024-05-22 | Stop reason: HOSPADM

## 2024-05-10 RX ORDER — POTASSIUM CHLORIDE 20 MEQ/1
20 TABLET, EXTENDED RELEASE ORAL ONCE
Status: DISCONTINUED | OUTPATIENT
Start: 2024-05-10 | End: 2024-05-10

## 2024-05-10 RX ORDER — SULFAMETHOXAZOLE AND TRIMETHOPRIM 800; 160 MG/1; MG/1
1 TABLET ORAL 2 TIMES DAILY
Status: DISCONTINUED | OUTPATIENT
Start: 2024-05-10 | End: 2024-05-13

## 2024-05-10 RX ADMIN — MICONAZOLE NITRATE 2 % TOPICAL POWDER: at 09:05

## 2024-05-10 RX ADMIN — ATORVASTATIN CALCIUM 20 MG: 20 TABLET, FILM COATED ORAL at 10:05

## 2024-05-10 RX ADMIN — TAMSULOSIN HYDROCHLORIDE 0.4 MG: 0.4 CAPSULE ORAL at 03:05

## 2024-05-10 RX ADMIN — SULFAMETHOXAZOLE AND TRIMETHOPRIM 1 TABLET: 800; 160 TABLET ORAL at 09:05

## 2024-05-10 RX ADMIN — FUROSEMIDE 80 MG: 10 INJECTION, SOLUTION INTRAVENOUS at 09:05

## 2024-05-10 RX ADMIN — AMOXICILLIN AND CLAVULANATE POTASSIUM 1 TABLET: 875; 125 TABLET, FILM COATED ORAL at 09:05

## 2024-05-10 RX ADMIN — POTASSIUM PHOSPHATE, MONOBASIC AND POTASSIUM PHOSPHATE, DIBASIC 15 MMOL: 224; 236 INJECTION, SOLUTION, CONCENTRATE INTRAVENOUS at 10:05

## 2024-05-10 RX ADMIN — ASPIRIN 81 MG: 81 TABLET, COATED ORAL at 10:05

## 2024-05-10 RX ADMIN — MICONAZOLE NITRATE 2 % TOPICAL POWDER: at 10:05

## 2024-05-10 RX ADMIN — FUROSEMIDE 80 MG: 10 INJECTION, SOLUTION INTRAVENOUS at 06:05

## 2024-05-10 RX ADMIN — APIXABAN 2.5 MG: 2.5 TABLET, FILM COATED ORAL at 09:05

## 2024-05-10 RX ADMIN — ACETAMINOPHEN 650 MG: 325 TABLET ORAL at 03:05

## 2024-05-10 RX ADMIN — FLUTICASONE FUROATE AND VILANTEROL TRIFENATATE 1 PUFF: 100; 25 POWDER RESPIRATORY (INHALATION) at 08:05

## 2024-05-10 RX ADMIN — METOPROLOL SUCCINATE 100 MG: 100 TABLET, EXTENDED RELEASE ORAL at 10:05

## 2024-05-10 RX ADMIN — POTASSIUM BICARBONATE 50 MEQ: 978 TABLET, EFFERVESCENT ORAL at 06:05

## 2024-05-10 RX ADMIN — APIXABAN 2.5 MG: 2.5 TABLET, FILM COATED ORAL at 10:05

## 2024-05-10 NOTE — CARE UPDATE
"RAPID RESPONSE NURSE CHART REVIEW        Chart Reviewed: 05/09/2024, 10:25 PM    MRN: [id number]  Bed: 1106/1106 A    Dx: Acute on chronic diastolic CHF (congestive heart failure)    Misa Barajas has a past medical history of Acute hypoxemic respiratory failure, Acute superficial venous thrombosis of lower extremity, bilateral, Aortic atherosclerosis, Arthritis, Asthma-COPD overlap syndrome, Breast cancer, Bronchitis, Cataracts, bilateral, Chronic anticoagulation, Chronic diastolic heart failure, Chronic kidney disease, stage 3, History of chemotherapy, Hyperlipidemia, Hypertension, Lymphedema of both lower extremities, Nephrolithiasis, Osteoporosis, Paroxysmal atrial fibrillation, Renal calculi, Renal osteodystrophy, Venous stasis dermatitis of both lower extremities, and Vitamin D insufficiency.    Last VS: /61 (BP Location: Right arm, Patient Position: Lying)   Pulse 94   Temp 98.2 °F (36.8 °C) (Oral)   Resp 18   Ht 5' 4" (1.626 m)   Wt 83 kg (182 lb 15.7 oz)   SpO2 (!) 93%   BMI 31.41 kg/m²     24H Vital Sign Range:  Temp:  [97.3 °F (36.3 °C)-98.2 °F (36.8 °C)]   Pulse:  []   Resp:  [15-18]   BP: ()/(59-62)   SpO2:  [93 %-99 %]     Level of Consciousness (AVPU): alert    Recent Labs     05/07/24  1752 05/08/24  0514 05/09/24  0532   WBC 8.53 8.94 8.54   HGB 13.2 12.6 12.5   HCT 41.3 39.3 39.7    169 175       Recent Labs     05/08/24  0514 05/09/24  0532 05/09/24  0744 05/09/24  1929    137  --  137   K 3.8 3.6  --  3.3*    103  --  102   CO2 24 23  --  27   BUN 26* 25*  --  24*   CREATININE 1.5* 1.5*  --  1.5*   GLU 91 82  --  116*   PHOS 3.4 3.0  --   --    MG 2.2  --  2.1 2.0        OXYGEN:  Flow (L/min) (Oxygen Therapy): 2          MEWS score: 1    Charge RN, Buffy  No additional concerns verbalized at this time. Instructed to call [phone] for further concerns or assistance.    Tenisha Laguna RN        "

## 2024-05-10 NOTE — ASSESSMENT & PLAN NOTE
Patient has Abnormal Phosphorus: hypophosphatemia. Will continue to monitor electrolytes closely. Will replace the affected electrolytes and repeat labs to be done after interventions completed. The patient's phosphorus results have been reviewed and are listed below.  Recent Labs   Lab 05/10/24  9756   PHOS 2.5*       Attending Attestation (For Attendings USE Only)...

## 2024-05-10 NOTE — PROGRESS NOTES
Therapy with vancomycin has been discontinued by provider.  Will sign off, please re-consult, if needed.      Karey Mullins, Pharm. D.   Pharmacist  [facility]

## 2024-05-10 NOTE — ASSESSMENT & PLAN NOTE
- last echo with EF 60-65%, GII LV DD  - BNP significantly elevated to 1165  - appears overloaded on exam with BLE edema and increased weight gain over the past few days despite increasing her home lasix from daily to BID  - CXR consistent with pulmonary edema and small bilateral pleural effusions   - continue BB  - Monitor on telemetry.   - Patient is on CHF pathway.  Monitor strict Is&Os and daily weights.  Place on fluid restriction of 1.5 L.   - start IV diuresis with lasix 40 mg BID, increased to 80mg IV BID  - goal UOP of -2L per day   - repeat echo noted below    Results for orders placed during the hospital encounter of 05/07/24    Echo    Interpretation Summary    Left Ventricle: The left ventricle is normal in size. Ventricular mass is normal. Normal wall thickness. Normal wall motion. There is normal systolic function. Ejection fraction by visual approximation is 55%. Unable to assess diastolic function due to atrial fibrillation.    Right Ventricle: Normal right ventricular cavity size. Wall thickness is normal. Systolic function is mildly reduced.    Aortic Valve: The aortic valve is a trileaflet valve. There is moderate aortic valve sclerosis. There is annular calcification present. Mildly restricted motion. No stenosis.    Tricuspid Valve: There is mild to moderate regurgitation.    Pulmonary Artery: The estimated pulmonary artery systolic pressure is 44 mmHg.    IVC/SVC: Elevated venous pressure at 15 mmHg.

## 2024-05-10 NOTE — ASSESSMENT & PLAN NOTE
- patient with urinary retention on admission requiring straight cath   - q6hr bladder scans ordered   - flomax initiated   - mobilize patient as she has not been out of bed since admission   - if patient still retaining tomorrow, plan to place orosco with OP urology eval

## 2024-05-10 NOTE — PROGRESS NOTES
[facility] [address] [place] [address] Medicine  Progress Note    Patient Name: Misa Barajas  MRN: [id number]  Patient Class: IP- Inpatient   Admission Date: 5/7/2024  Length of Stay: 2 days  Attending Physician: Fiorella Cobian MD  Primary Care Provider: Celia Carlisle MD        Subjective:     Principal Problem:Acute on chronic diastolic CHF (congestive heart failure)        HPI:  Misa Barajas is a 78 y.o. female with a PMHx of HTN, HLD, HFpEF, COPD on home O2 (2L NC baseline O2 sat 92%) and pAF presentswith increasing LE swelling.  She is gained 5 lb in the past 3 days despite increased Lasix use. She also endorses new redness and warmth with associated itching to her RLE. She has small pustules that she noticed to RLE a few days ago.  Denies fever, chills, chest pain, cough, SOB, abdominal pain, n/v/d, dysuria, headaches. Has chronic discoloration to BLEs without recent worsening.     ED: Afebrile . Hr 90s. Satting well on home 2L NC. CBC without leukocytosis or anemia. CMP notable for small MARQUEZ with Cr 1.8 (baseline 1.3) and BUN 26. BNP 1165. Trop 0.032 (0.028 at baseline). Lactic 1.2. CXR with Cardiomegaly with pulmonary vascular congestion and small bilateral pleural effusions, suggestive of pulmonary edema secondary to CHF. Given rocephin, toprol 100 mg and IV lasix 60 mg. Placed in observation for CHF exacerbation.     Overview/Hospital Course:  Misa Barajas is a 78 y.o. F who was admitted to  for further evaluation of acute on chronic diastolic CHF. Diuresing on IV lasix. Echo with EF 55%, unable to assess diastolic function due to a-fib, CVP 15mmHg. Cr 1.8 on admission most likely 2/2 to fluid overloaded, now improved with diuresis. UA infectious, culture with staph aureus, enterococcus faecalis. Monitor susceptibilities. IV rocephin switched to IV vancomycin. Patient with urinary retention requiring straight cath. Initiated flomax and bladder scan q6hr. Will place orosco if having continued  retention.     Interval History: Patient seen and assessed at bedside. Afebrile, VSS. Cr 1.2 on am labs with diuresis. Continued lower extremity edema, continue lasix. Ucx with staph aureus and enterococcus. Patient with urinary retention, q6hr bladder scans and flomax initiated - continue to monitor.       Review of Systems   Constitutional:  Negative for activity change, chills and fever.   HENT:  Negative for trouble swallowing.    Eyes:  Negative for photophobia and visual disturbance.   Respiratory:  Negative for cough, chest tightness and shortness of breath (improved).    Cardiovascular:  Positive for leg swelling. Negative for chest pain and palpitations.   Gastrointestinal:  Negative for abdominal pain, constipation, diarrhea, nausea and vomiting.   Genitourinary:  Negative for dysuria, frequency and hematuria.   Musculoskeletal:  Negative for back pain, gait problem and neck pain.   Skin:  Positive for color change. Negative for rash and wound.   Neurological:  Negative for dizziness, syncope, speech difficulty and light-headedness.   Psychiatric/Behavioral:  Negative for agitation and confusion. The patient is not nervous/anxious.      Objective:     Vital Signs (Most Recent):  Temp: 97.7 °F (36.5 °C) (05/10/24 1134)  Pulse: (!) 113 (05/10/24 1134)  Resp: 18 (05/10/24 1134)  BP: 110/70 (05/10/24 1134)  SpO2: 95 % (05/10/24 1134) Vital Signs (24h Range):  Temp:  [97.2 °F (36.2 °C)-98.2 °F (36.8 °C)] 97.7 °F (36.5 °C)  Pulse:  [] 113  Resp:  [15-18] 18  SpO2:  [93 %-99 %] 95 %  BP: ()/(60-87) 110/70     Weight: 83 kg (182 lb 15.7 oz)  Body mass index is 31.41 kg/m².    Intake/Output Summary (Last 24 hours) at 5/10/2024 1337  Last data filed at 5/10/2024 1127  Gross per 24 hour   Intake --   Output 1850 ml   Net -1850 ml         Physical Exam  Vitals and nursing note reviewed.   Constitutional:       General: She is not in acute distress.     Appearance: She is well-developed.   HENT:      Head:  Normocephalic and atraumatic.      Mouth/Throat:      Pharynx: No oropharyngeal exudate.   Eyes:      General: No scleral icterus.     Conjunctiva/sclera: Conjunctivae normal.   Cardiovascular:      Rate and Rhythm: Normal rate and regular rhythm.      Heart sounds: Normal heart sounds.   Pulmonary:      Effort: Pulmonary effort is normal. No respiratory distress.      Breath sounds: Normal breath sounds. No wheezing or rales.   Abdominal:      General: Bowel sounds are normal. There is no distension.      Palpations: Abdomen is soft.      Tenderness: There is no abdominal tenderness.   Musculoskeletal:         General: No tenderness. Normal range of motion.      Cervical back: Normal range of motion and neck supple.      Right lower le+ Pitting Edema present.      Left lower le+ Pitting Edema present.   Lymphadenopathy:      Cervical: No cervical adenopathy.   Skin:     General: Skin is warm and dry.      Capillary Refill: Capillary refill takes less than 2 seconds.      Findings: Erythema present. No rash.   Neurological:      Mental Status: She is alert and oriented to person, place, and time.      Cranial Nerves: No cranial nerve deficit.      Sensory: No sensory deficit.      Coordination: Coordination normal.   Psychiatric:         Behavior: Behavior normal.         Thought Content: Thought content normal.         Judgment: Judgment normal.             Significant Labs: All pertinent labs within the past 24 hours have been reviewed.    Significant Imaging: I have reviewed all pertinent imaging results/findings within the past 24 hours.    Assessment/Plan:      * Acute on chronic diastolic CHF (congestive heart failure)  - last echo with EF 60-65%, GII LV DD  - BNP significantly elevated to 1165  - appears overloaded on exam with BLE edema and increased weight gain over the past few days despite increasing her home lasix from daily to BID  - CXR consistent with pulmonary edema and small bilateral  pleural effusions   - continue BB  - Monitor on telemetry.   - Patient is on CHF pathway.  Monitor strict Is&Os and daily weights.  Place on fluid restriction of 1.5 L.   - start IV diuresis with lasix 40 mg BID, increased to 80mg IV BID  - goal UOP of -2L per day   - repeat echo noted below    Results for orders placed during the hospital encounter of 05/07/24    Echo    Interpretation Summary    Left Ventricle: The left ventricle is normal in size. Ventricular mass is normal. Normal wall thickness. Normal wall motion. There is normal systolic function. Ejection fraction by visual approximation is 55%. Unable to assess diastolic function due to atrial fibrillation.    Right Ventricle: Normal right ventricular cavity size. Wall thickness is normal. Systolic function is mildly reduced.    Aortic Valve: The aortic valve is a trileaflet valve. There is moderate aortic valve sclerosis. There is annular calcification present. Mildly restricted motion. No stenosis.    Tricuspid Valve: There is mild to moderate regurgitation.    Pulmonary Artery: The estimated pulmonary artery systolic pressure is 44 mmHg.    IVC/SVC: Elevated venous pressure at 15 mmHg.    Acute cystitis  - UA infectious on admission   - Ucx currently growing staph aureus and enterococcus  - pending susceptibilities of culture  - switched IV rocephin to IV vanc  - bladder scan with 500mL, straight cath performed  - monitor patient for continued urinary retention  - plan to discharge patient on bactrim      Urinary retention  - patient with urinary retention on admission requiring straight cath   - q6hr bladder scans ordered   - flomax initiated   - mobilize patient as she has not been out of bed since admission   - if patient still retaining tomorrow, plan to place orosco with OP urology eval      Hypophosphatemia  Patient has Abnormal Phosphorus: hypophosphatemia. Will continue to monitor electrolytes closely. Will replace the affected electrolytes and  repeat labs to be done after interventions completed. The patient's phosphorus results have been reviewed and are listed below.  Recent Labs   Lab 05/10/24  0459   PHOS 2.5*        Cellulitis of right lower extremity  - afebrile without leukocytosis  - start IV rocephin 1g q24hr, switched to IV vanc  - blood cultures pending   - one culture with micrococcus, probable contaminant   - repeat bcx drawn - currently NGTD  - wound care consulted    Elevated troponin  - trop 0.032 on admit, baseline ~0.028  - patient denies chest pain  - likely elevated 2/2 CHF exacerbation  - repeat trop pending --> at baseline  - monitor tele     Acute renal failure superimposed on stage 3a chronic kidney disease  RESOLVED   - Cr 1.8 on admit, baseline 1.3  - Estimated Creatinine Clearance: 39 mL/min (based on SCr of 1.2 mg/dL).  - Likely 2/2 hypervolemia in setting of CHF exacerbation. Should improve with IV diuresis  - renally dose meds  - avoid nephrotoxic insults  - monitor I/Os  - monitor with daily BMP, replete lytes if necessary  - improved to 1.2 this am with diuresis    Chronic respiratory failure with hypoxia  - chronic and stable on home 2L    Centrilobular emphysema  Patient's COPD is controlled currently.  Patient is currently off COPD Pathway. Continue scheduled inhalers Supplemental oxygen and monitor respiratory status closely.   - satting well on home 2L NC    Hyperlipidemia  - continue statin     Paroxysmal atrial fibrillation  Patient with Paroxysmal (<7 days) atrial fibrillation which is controlled currently with Beta Blocker. Patient is currently in atrial fibrillation.[id number] Score: 3. Anticoagulation indicated. Anticoagulation done with eliquis 5 mg BID  .    Essential hypertension  - controlled on admit  - continue toprol 100 mg and losartan 50 mg daily  - IV diuresis as above      VTE Risk Mitigation (From admission, onward)           Ordered     apixaban tablet 2.5 mg  2 times daily         05/08/24 0443      IP VTE HIGH RISK PATIENT  Once         05/07/24 2029     Reason for No Pharmacological VTE Prophylaxis  Once        Question:  Reasons:  Answer:  Already adequately anticoagulated on oral Anticoagulants    05/07/24 2029     Place sequential compression device  Until discontinued         05/07/24 2024                    Discharge Planning   NOHEMI: 5/11/2024     Code Status: Full Code   Is the patient medically ready for discharge?:     Reason for patient still in hospital (select all that apply): Patient trending condition and Treatment  Discharge Plan A: Home Health                  Adina Wolff PA-C  Department of Hospital Medicine   [facility] 11[facility]

## 2024-05-10 NOTE — ASSESSMENT & PLAN NOTE
- afebrile without leukocytosis  - start IV rocephin 1g q24hr, switched to IV vanc  - blood cultures pending   - one culture with micrococcus, probable contaminant   - repeat bcx drawn - currently NGTD  - wound care consulted

## 2024-05-10 NOTE — ASSESSMENT & PLAN NOTE
- UA infectious on admission   - Ucx currently growing staph aureus and enterococcus  - pending susceptibilities of culture  - switched IV rocephin to IV vanc  - bladder scan with 500mL, straight cath performed  - monitor patient for continued urinary retention  - plan to discharge patient on bactrim

## 2024-05-10 NOTE — ASSESSMENT & PLAN NOTE
RESOLVED   - Cr 1.8 on admit, baseline 1.3  - Estimated Creatinine Clearance: 39 mL/min (based on SCr of 1.2 mg/dL).  - Likely 2/2 hypervolemia in setting of CHF exacerbation. Should improve with IV diuresis  - renally dose meds  - avoid nephrotoxic insults  - monitor I/Os  - monitor with daily BMP, replete lytes if necessary  - improved to 1.2 this am with diuresis

## 2024-05-10 NOTE — NURSING
Patient AAOX4. No distress noted. Patient has Lower extremities edema +2 bilateraly. Patient has two wounds at the Right lower leg. Foam dressing in place. Patient has a blister in the Left ankle with redness and tenderness. Side rails x2. Belongings at bedside.   Pt free of fall and injuries Questions and concerns voiced and answered.    Plan of care continue.

## 2024-05-10 NOTE — ASSESSMENT & PLAN NOTE
Patient with Paroxysmal (<7 days) atrial fibrillation which is controlled currently with Beta Blocker. Patient is currently in atrial fibrillation.[id number] Score: 3. Anticoagulation indicated. Anticoagulation done with eliquis 5 mg BID  .

## 2024-05-10 NOTE — PLAN OF CARE
Problem: Adult Inpatient Plan of Care  Goal: Plan of Care Review  5/10/2024 1830 by Pinadolph SeverinoRobby arriagaa S, LPN  Outcome: Progressing  5/10/2024 1712 by Pinadolph SeverinoRobby arriagaa S, LPN  Outcome: Progressing  Goal: Patient-Specific Goal (Individualized)  5/10/2024 1830 by Pinadolph Severino, Farhana S, LPN  Outcome: Progressing  5/10/2024 1712 by Pinadolph Severino, Farhana S, LPN  Outcome: Progressing  Goal: Absence of Hospital-Acquired Illness or Injury  5/10/2024 1830 by Pinot Severino, Farhana S, LPN  Outcome: Progressing  5/10/2024 1712 by Pinot SeverinoRobby arriagaa S, LPN  Outcome: Progressing  Goal: Optimal Comfort and Wellbeing  5/10/2024 1830 by Pinadolph Severino, Farhana S, LPN  Outcome: Progressing  5/10/2024 1712 by Pinot Severino, Farhana S, LPN  Outcome: Progressing  Goal: Readiness for Transition of Care  5/10/2024 1830 by Pinadolph Severino, Farhana S, LPN  Outcome: Progressing  5/10/2024 1712 by Pinot Severino, Farhana S, LPN  Outcome: Progressing     Problem: Acute Kidney Injury/Impairment  Goal: Fluid and Electrolyte Balance  5/10/2024 1830 by Pinadolph SeverinoRobby arriagaa S, LPN  Outcome: Progressing  5/10/2024 1712 by Pinot Severino, Farhana S, LPN  Outcome: Progressing  Goal: Improved Oral Intake  5/10/2024 1830 by Pinadolph Severino, Farhana S, LPN  Outcome: Progressing  5/10/2024 1712 by Pinadolph Severino, Farhana S, LPN  Outcome: Progressing  Goal: Effective Renal Function  5/10/2024 1830 by Pinot Severino, Farhana S, LPN  Outcome: Progressing  5/10/2024 1712 by Pinadolph Severino, Farhana S, LPN  Outcome: Progressing     Problem: Infection  Goal: Absence of Infection Signs and Symptoms  5/10/2024 1830 by Pinot Severino, Farhana S, LPN  Outcome: Progressing  5/10/2024 1712 by Pinot Severino, Farhana S, LPN  Outcome: Progressing     Problem: Wound  Goal: Optimal Coping  5/10/2024 1830 by Farhana Watson LPN  Outcome: Progressing  5/10/2024 1712 by Farhana Watson LPN  Outcome: Progressing  Goal: Optimal Functional Ability  5/10/2024 1830 by Farhana Watson  S, LPN  Outcome: Progressing  5/10/2024 1712 by Pinot Severino, Farhana S, LPN  Outcome: Progressing  Goal: Absence of Infection Signs and Symptoms  5/10/2024 1830 by Pinot Severino, Farhana S, LPN  Outcome: Progressing  5/10/2024 1712 by Pinot Severino, Farhana S, LPN  Outcome: Progressing  Goal: Improved Oral Intake  5/10/2024 1830 by Pinot Severino, Farhana S, LPN  Outcome: Progressing  5/10/2024 1712 by Pinot Severino, Farhana S, LPN  Outcome: Progressing  Goal: Optimal Pain Control and Function  5/10/2024 1830 by Pinot Severino, Farhana S, LPN  Outcome: Progressing  5/10/2024 1712 by Pinot Severino Farhana S, LPN  Outcome: Progressing  Goal: Skin Health and Integrity  5/10/2024 1830 by Pinot Severino, Farhana S, LPN  Outcome: Progressing  5/10/2024 1712 by Pinot Severino, Farhana S, LPN  Outcome: Progressing  Goal: Optimal Wound Healing  5/10/2024 1830 by Pinot Severino, Farhana S, LPN  Outcome: Progressing  5/10/2024 1712 by Pinot Severino Farhana S, LPN  Outcome: Progressing     Problem: Fall Injury Risk  Goal: Absence of Fall and Fall-Related Injury  5/10/2024 1830 by Pinot Severino, Farhana S, LPN  Outcome: Progressing  5/10/2024 1712 by Pinot Severino Farhana S, LPN  Outcome: Progressing     Problem: Skin Injury Risk Increased  Goal: Skin Health and Integrity  5/10/2024 1830 by Pinot Severino, Farhana S, LPN  Outcome: Progressing  5/10/2024 1712 by Pinot Severino, Farhana S, LPN  Outcome: Progressing

## 2024-05-10 NOTE — SUBJECTIVE & OBJECTIVE
Interval History: Patient seen and assessed at bedside. Afebrile, VSS. Cr 1.2 on am labs with diuresis. Continued lower extremity edema, continue lasix. Ucx with staph aureus and enterococcus. Patient with urinary retention, q6hr bladder scans and flomax initiated - continue to monitor.       Review of Systems   Constitutional:  Negative for activity change, chills and fever.   HENT:  Negative for trouble swallowing.    Eyes:  Negative for photophobia and visual disturbance.   Respiratory:  Negative for cough, chest tightness and shortness of breath (improved).    Cardiovascular:  Positive for leg swelling. Negative for chest pain and palpitations.   Gastrointestinal:  Negative for abdominal pain, constipation, diarrhea, nausea and vomiting.   Genitourinary:  Negative for dysuria, frequency and hematuria.   Musculoskeletal:  Negative for back pain, gait problem and neck pain.   Skin:  Positive for color change. Negative for rash and wound.   Neurological:  Negative for dizziness, syncope, speech difficulty and light-headedness.   Psychiatric/Behavioral:  Negative for agitation and confusion. The patient is not nervous/anxious.      Objective:     Vital Signs (Most Recent):  Temp: 97.7 °F (36.5 °C) (05/10/24 1134)  Pulse: (!) 113 (05/10/24 1134)  Resp: 18 (05/10/24 1134)  BP: 110/70 (05/10/24 1134)  SpO2: 95 % (05/10/24 1134) Vital Signs (24h Range):  Temp:  [97.2 °F (36.2 °C)-98.2 °F (36.8 °C)] 97.7 °F (36.5 °C)  Pulse:  [] 113  Resp:  [15-18] 18  SpO2:  [93 %-99 %] 95 %  BP: ()/(60-87) 110/70     Weight: 83 kg (182 lb 15.7 oz)  Body mass index is 31.41 kg/m².    Intake/Output Summary (Last 24 hours) at 5/10/2024 1337  Last data filed at 5/10/2024 1127  Gross per 24 hour   Intake --   Output 1850 ml   Net -1850 ml         Physical Exam  Vitals and nursing note reviewed.   Constitutional:       General: She is not in acute distress.     Appearance: She is well-developed.   HENT:      Head: Normocephalic  and atraumatic.      Mouth/Throat:      Pharynx: No oropharyngeal exudate.   Eyes:      General: No scleral icterus.     Conjunctiva/sclera: Conjunctivae normal.   Cardiovascular:      Rate and Rhythm: Normal rate and regular rhythm.      Heart sounds: Normal heart sounds.   Pulmonary:      Effort: Pulmonary effort is normal. No respiratory distress.      Breath sounds: Normal breath sounds. No wheezing or rales.   Abdominal:      General: Bowel sounds are normal. There is no distension.      Palpations: Abdomen is soft.      Tenderness: There is no abdominal tenderness.   Musculoskeletal:         General: No tenderness. Normal range of motion.      Cervical back: Normal range of motion and neck supple.      Right lower le+ Pitting Edema present.      Left lower le+ Pitting Edema present.   Lymphadenopathy:      Cervical: No cervical adenopathy.   Skin:     General: Skin is warm and dry.      Capillary Refill: Capillary refill takes less than 2 seconds.      Findings: Erythema present. No rash.   Neurological:      Mental Status: She is alert and oriented to person, place, and time.      Cranial Nerves: No cranial nerve deficit.      Sensory: No sensory deficit.      Coordination: Coordination normal.   Psychiatric:         Behavior: Behavior normal.         Thought Content: Thought content normal.         Judgment: Judgment normal.             Significant Labs: All pertinent labs within the past 24 hours have been reviewed.    Significant Imaging: I have reviewed all pertinent imaging results/findings within the past 24 hours.

## 2024-05-10 NOTE — PLAN OF CARE
Problem: Adult Inpatient Plan of Care  Goal: Plan of Care Review  Outcome: Progressing  Goal: Patient-Specific Goal (Individualized)  Outcome: Progressing  Goal: Absence of Hospital-Acquired Illness or Injury  Outcome: Progressing  Goal: Optimal Comfort and Wellbeing  Outcome: Progressing     Problem: Acute Kidney Injury/Impairment  Goal: Fluid and Electrolyte Balance  Outcome: Progressing  Goal: Improved Oral Intake  Outcome: Progressing  Goal: Effective Renal Function  Outcome: Progressing     Problem: Infection  Goal: Absence of Infection Signs and Symptoms  Outcome: Progressing     Problem: Wound  Goal: Optimal Coping  Outcome: Progressing  Goal: Optimal Functional Ability  Outcome: Progressing  Goal: Improved Oral Intake  Outcome: Progressing  Goal: Optimal Pain Control and Function  Outcome: Progressing     Problem: Fall Injury Risk  Goal: Absence of Fall and Fall-Related Injury  Outcome: Progressing     Problem: Skin Injury Risk Increased  Goal: Skin Health and Integrity  Outcome: Progressing

## 2024-05-11 LAB
ANION GAP SERPL CALC-SCNC: 9 MMOL/L (ref 8–16)
BACTERIA BLD CULT: ABNORMAL
BUN SERPL-MCNC: 22 MG/DL (ref 8–23)
CALCIUM SERPL-MCNC: 9.4 MG/DL (ref 8.7–10.5)
CHLORIDE SERPL-SCNC: 103 MMOL/L (ref 95–110)
CO2 SERPL-SCNC: 28 MMOL/L (ref 23–29)
CREAT SERPL-MCNC: 1.3 MG/DL (ref 0.5–1.4)
EST. GFR  (NO RACE VARIABLE): 41.6 ML/MIN/1.73 M^2
GLUCOSE SERPL-MCNC: 94 MG/DL (ref 70–110)
MAGNESIUM SERPL-MCNC: 2 MG/DL (ref 1.6–2.6)
OHS QRS DURATION: 136 MS
OHS QTC CALCULATION: 564 MS
PHOSPHATE SERPL-MCNC: 3.1 MG/DL (ref 2.7–4.5)
POTASSIUM SERPL-SCNC: 3.7 MMOL/L (ref 3.5–5.1)
SODIUM SERPL-SCNC: 140 MMOL/L (ref 136–145)

## 2024-05-11 PROCEDURE — 63600175 PHARM REV CODE 636 W HCPCS: Performed by: NURSE PRACTITIONER

## 2024-05-11 PROCEDURE — 36415 COLL VENOUS BLD VENIPUNCTURE: CPT

## 2024-05-11 PROCEDURE — 84100 ASSAY OF PHOSPHORUS: CPT

## 2024-05-11 PROCEDURE — 25000003 PHARM REV CODE 250: Performed by: NURSE PRACTITIONER

## 2024-05-11 PROCEDURE — 36415 COLL VENOUS BLD VENIPUNCTURE: CPT | Performed by: PHYSICIAN ASSISTANT

## 2024-05-11 PROCEDURE — 97162 PT EVAL MOD COMPLEX 30 MIN: CPT

## 2024-05-11 PROCEDURE — 94761 N-INVAS EAR/PLS OXIMETRY MLT: CPT

## 2024-05-11 PROCEDURE — 25000003 PHARM REV CODE 250

## 2024-05-11 PROCEDURE — 97530 THERAPEUTIC ACTIVITIES: CPT

## 2024-05-11 PROCEDURE — 27000221 HC OXYGEN, UP TO 24 HOURS

## 2024-05-11 PROCEDURE — 51798 US URINE CAPACITY MEASURE: CPT

## 2024-05-11 PROCEDURE — 80048 BASIC METABOLIC PNL TOTAL CA: CPT | Performed by: PHYSICIAN ASSISTANT

## 2024-05-11 PROCEDURE — 94640 AIRWAY INHALATION TREATMENT: CPT

## 2024-05-11 PROCEDURE — 83735 ASSAY OF MAGNESIUM: CPT

## 2024-05-11 PROCEDURE — 21400001 HC TELEMETRY ROOM

## 2024-05-11 PROCEDURE — 51702 INSERT TEMP BLADDER CATH: CPT

## 2024-05-11 PROCEDURE — 25000003 PHARM REV CODE 250: Performed by: PHYSICIAN ASSISTANT

## 2024-05-11 RX ORDER — FUROSEMIDE 80 MG/1
80 TABLET ORAL DAILY
Status: DISCONTINUED | OUTPATIENT
Start: 2024-05-12 | End: 2024-05-12

## 2024-05-11 RX ORDER — FUROSEMIDE 10 MG/ML
40 INJECTION INTRAMUSCULAR; INTRAVENOUS 2 TIMES DAILY
Status: DISCONTINUED | OUTPATIENT
Start: 2024-05-11 | End: 2024-05-11

## 2024-05-11 RX ORDER — AMOXICILLIN AND CLAVULANATE POTASSIUM 875; 125 MG/1; MG/1
1 TABLET, FILM COATED ORAL EVERY 12 HOURS
Qty: 4 TABLET | Refills: 0 | Status: SHIPPED | OUTPATIENT
Start: 2024-05-12 | End: 2024-05-18 | Stop reason: HOSPADM

## 2024-05-11 RX ORDER — SULFAMETHOXAZOLE AND TRIMETHOPRIM 800; 160 MG/1; MG/1
1 TABLET ORAL 2 TIMES DAILY
Qty: 4 TABLET | Refills: 0 | Status: SHIPPED | OUTPATIENT
Start: 2024-05-11 | End: 2024-05-18 | Stop reason: HOSPADM

## 2024-05-11 RX ORDER — TAMSULOSIN HYDROCHLORIDE 0.4 MG/1
0.4 CAPSULE ORAL DAILY
Qty: 30 CAPSULE | Refills: 11 | Status: SHIPPED | OUTPATIENT
Start: 2024-05-12 | End: 2025-05-12

## 2024-05-11 RX ORDER — ACETAMINOPHEN 500 MG
1000 TABLET ORAL EVERY 8 HOURS PRN
Status: DISCONTINUED | OUTPATIENT
Start: 2024-05-11 | End: 2024-05-22 | Stop reason: HOSPADM

## 2024-05-11 RX ORDER — FUROSEMIDE 40 MG/1
80 TABLET ORAL DAILY
Qty: 60 TABLET | Refills: 11 | Status: SHIPPED | OUTPATIENT
Start: 2024-05-11 | End: 2024-05-18 | Stop reason: HOSPADM

## 2024-05-11 RX ADMIN — ASPIRIN 81 MG: 81 TABLET, COATED ORAL at 09:05

## 2024-05-11 RX ADMIN — MICONAZOLE NITRATE 2 % TOPICAL POWDER: at 08:05

## 2024-05-11 RX ADMIN — APIXABAN 2.5 MG: 2.5 TABLET, FILM COATED ORAL at 08:05

## 2024-05-11 RX ADMIN — ACETAMINOPHEN 1000 MG: 500 TABLET ORAL at 11:05

## 2024-05-11 RX ADMIN — SODIUM CHLORIDE, POTASSIUM CHLORIDE, SODIUM LACTATE AND CALCIUM CHLORIDE 250 ML: 600; 310; 30; 20 INJECTION, SOLUTION INTRAVENOUS at 08:05

## 2024-05-11 RX ADMIN — METOPROLOL SUCCINATE 100 MG: 100 TABLET, EXTENDED RELEASE ORAL at 11:05

## 2024-05-11 RX ADMIN — MICONAZOLE NITRATE 2 % TOPICAL POWDER: at 09:05

## 2024-05-11 RX ADMIN — APIXABAN 2.5 MG: 2.5 TABLET, FILM COATED ORAL at 09:05

## 2024-05-11 RX ADMIN — ATORVASTATIN CALCIUM 20 MG: 20 TABLET, FILM COATED ORAL at 09:05

## 2024-05-11 RX ADMIN — SULFAMETHOXAZOLE AND TRIMETHOPRIM 1 TABLET: 800; 160 TABLET ORAL at 08:05

## 2024-05-11 RX ADMIN — AMOXICILLIN AND CLAVULANATE POTASSIUM 1 TABLET: 875; 125 TABLET, FILM COATED ORAL at 09:05

## 2024-05-11 RX ADMIN — FLUTICASONE FUROATE AND VILANTEROL TRIFENATATE 1 PUFF: 100; 25 POWDER RESPIRATORY (INHALATION) at 07:05

## 2024-05-11 RX ADMIN — POLYETHYLENE GLYCOL 3350 17 G: 17 POWDER, FOR SOLUTION ORAL at 09:05

## 2024-05-11 RX ADMIN — TAMSULOSIN HYDROCHLORIDE 0.4 MG: 0.4 CAPSULE ORAL at 09:05

## 2024-05-11 RX ADMIN — SULFAMETHOXAZOLE AND TRIMETHOPRIM 1 TABLET: 800; 160 TABLET ORAL at 09:05

## 2024-05-11 NOTE — ASSESSMENT & PLAN NOTE
- last echo with EF 60-65%, GII LV DD  - BNP significantly elevated to 1165  - appears overloaded on exam with BLE edema and increased weight gain over the past few days despite increasing her home lasix from daily to BID  - CXR consistent with pulmonary edema and small bilateral pleural effusions   - continue BB  - Monitor on telemetry.   - Patient is on CHF pathway.  Monitor strict Is&Os and daily weights.  Place on fluid restriction of 1.5 L.   - start IV diuresis with lasix 40 mg BID, increased to 80mg IV BID   - holding IV lasix for soft pressures  - goal UOP of -2L per day   - repeat echo noted below    Results for orders placed during the hospital encounter of 05/07/24    Echo    Interpretation Summary    Left Ventricle: The left ventricle is normal in size. Ventricular mass is normal. Normal wall thickness. Normal wall motion. There is normal systolic function. Ejection fraction by visual approximation is 55%. Unable to assess diastolic function due to atrial fibrillation.    Right Ventricle: Normal right ventricular cavity size. Wall thickness is normal. Systolic function is mildly reduced.    Aortic Valve: The aortic valve is a trileaflet valve. There is moderate aortic valve sclerosis. There is annular calcification present. Mildly restricted motion. No stenosis.    Tricuspid Valve: There is mild to moderate regurgitation.    Pulmonary Artery: The estimated pulmonary artery systolic pressure is 44 mmHg.    IVC/SVC: Elevated venous pressure at 15 mmHg.

## 2024-05-11 NOTE — DISCHARGE INSTRUCTIONS
Ms. Barajas,  You will not take your losartan or jardiance for now-- one of your providers may restart these medications in a few weeks. You will take your lasix as needed for lower extremity swelling. I anticipate that you will need to take it at least every other day, but want to avoid your blood pressure from dropping too low.     It has been a pleasure caring for you, and I hope you continue to feel better! Please do not hesitate to reach out to me with any questions or concerns.     Nicole Nogueira PA-C  [facility] [place] [facility]

## 2024-05-11 NOTE — PLAN OF CARE
Problem: Physical Therapy  Goal: Physical Therapy Goal  Description: Goals to be met by: 2024     Patient will increase functional independence with mobility by performin. Sit to stand transfer with Stand-by Assistance. -In progress  2. Bed to chair transfer with Stand-by Assistance using Rolling Walker. -In progress  3. Gait x 25 feet with Stand-by Assistance using Rolling Walker. -In progress   4. Lower extremity exercise program x20 reps per handout, with supervision. -In progress  Outcome: Progressing

## 2024-05-11 NOTE — SUBJECTIVE & OBJECTIVE
Interval History: Patient seen and assessed at bedside. Afebrile, soft blood pressures, otherwise VSS. Patient was seen by me multiple times with family present. Discussed discharge plan with continued course of abx, orosco placement, with increased dose of po lasix for home. I was notified by nursing that family and patient concerned about discharging due to low BP - patient asymptomatic. Discontinued evening dose of lasix. Will monitor vital signs closely and give IVF as necessary.       Review of Systems   Constitutional:  Negative for activity change, chills and fever.   HENT:  Negative for trouble swallowing.    Eyes:  Negative for photophobia and visual disturbance.   Respiratory:  Negative for cough, chest tightness and shortness of breath (improved).    Cardiovascular:  Positive for leg swelling. Negative for chest pain and palpitations.   Gastrointestinal:  Negative for abdominal pain, constipation, diarrhea, nausea and vomiting.   Genitourinary:  Negative for dysuria, frequency and hematuria.   Musculoskeletal:  Negative for back pain, gait problem and neck pain.   Skin:  Positive for color change. Negative for rash and wound.   Neurological:  Negative for dizziness, syncope, speech difficulty and light-headedness.   Psychiatric/Behavioral:  Negative for agitation and confusion. The patient is not nervous/anxious.      Objective:     Vital Signs (Most Recent):  Temp: 98 °F (36.7 °C) (05/11/24 1557)  Pulse: 91 (05/11/24 1557)  Resp: 16 (05/11/24 1557)  BP: (!) 90/48 (05/11/24 1557)  SpO2: (!) 93 % (05/11/24 1557) Vital Signs (24h Range):  Temp:  [98 °F (36.7 °C)-98.6 °F (37 °C)] 98 °F (36.7 °C)  Pulse:  [] 91  Resp:  [16-18] 16  SpO2:  [90 %-96 %] 93 %  BP: ()/(48-70) 90/48     Weight: 85 kg (187 lb 6.3 oz)  Body mass index is 32.17 kg/m².    Intake/Output Summary (Last 24 hours) at 5/11/2024 1834  Last data filed at 5/11/2024 0900  Gross per 24 hour   Intake --   Output 1100 ml   Net -1100 ml          Physical Exam  Vitals and nursing note reviewed.   Constitutional:       General: She is not in acute distress.     Appearance: She is well-developed.   HENT:      Head: Normocephalic and atraumatic.      Mouth/Throat:      Pharynx: No oropharyngeal exudate.   Eyes:      General: No scleral icterus.     Conjunctiva/sclera: Conjunctivae normal.   Cardiovascular:      Rate and Rhythm: Normal rate and regular rhythm.      Heart sounds: Normal heart sounds.   Pulmonary:      Effort: Pulmonary effort is normal. No respiratory distress.      Breath sounds: Normal breath sounds. No wheezing or rales.   Abdominal:      General: Bowel sounds are normal. There is no distension.      Palpations: Abdomen is soft.      Tenderness: There is no abdominal tenderness.   Musculoskeletal:         General: No tenderness. Normal range of motion.      Cervical back: Normal range of motion and neck supple.      Right lower leg: Pitting Edema present.      Left lower leg: Pitting Edema present.   Lymphadenopathy:      Cervical: No cervical adenopathy.   Skin:     General: Skin is warm and dry.      Capillary Refill: Capillary refill takes less than 2 seconds.      Findings: Erythema present. No rash.   Neurological:      Mental Status: She is alert and oriented to person, place, and time.      Cranial Nerves: No cranial nerve deficit.      Sensory: No sensory deficit.      Coordination: Coordination normal.   Psychiatric:         Behavior: Behavior normal.         Thought Content: Thought content normal.         Judgment: Judgment normal.             Significant Labs: All pertinent labs within the past 24 hours have been reviewed.    Significant Imaging: I have reviewed all pertinent imaging results/findings within the past 24 hours.

## 2024-05-11 NOTE — PLAN OF CARE
"Update at 3:15 PM  CM to patient's bedside. Provided detailed printout regarding [employer] and reviewed with patient/daughter. Updated on [facility] status, resumption of care. Answered all questions to their satisfaction.     Update at 1:40 PM  Patient requested information on "[employer]. Searched website for meal program, qualification, and contact details. Generate printout for patient.    Update at 1:30 PM  [facility] referral and documents sent to [facility], requested resumption of care.       CM noted discharge order and reviewed chart. Patient was admitted for CHF. Patient will discharge home with [facility] home [facility], pending PA/MD approval. CM awaiting completion of home health orders.       Savanah Lorenzo RN  Weekend  - [facility] [place]  Spectralink: [phone]   "

## 2024-05-11 NOTE — ASSESSMENT & PLAN NOTE
Patient with Paroxysmal (<7 days) atrial fibrillation which is controlled currently with Beta Blocker. Patient is currently in atrial fibrillation.[id number] Score: 3. Anticoagulation indicated. Anticoagulation done with eliquis 5 mg BID  .

## 2024-05-11 NOTE — ASSESSMENT & PLAN NOTE
- UA infectious on admission   - Ucx currently growing staph aureus and enterococcus  - pending susceptibilities of culture  - switched IV rocephin to IV vanc  - bladder scan with 500mL, straight cath performed  - monitor patient for continued urinary retention  - plan to discharge patient on bactrim and augmentin

## 2024-05-11 NOTE — PLAN OF CARE
CM asked to speak with patient/family regarding appealing discharge. Explained Medicare rights, provided [facility] phone number and informed patient must obtain case number before end of call. IMM signed, CM witnessed. Will follow-up on appeal Sunday 5/12/24.      Savanah Lorenzo RN  Weekend  - [facility] [address]  Spectralink: [phone]

## 2024-05-11 NOTE — ASSESSMENT & PLAN NOTE
- controlled on admit  - continue toprol 100 mg and losartan 50 mg daily  - IV diuresis as above  - patient with soft pressures today, holding further diuretics   - patient asymptomatic  - monitor patient overnight for improvement

## 2024-05-11 NOTE — PT/OT/SLP EVAL
Physical Therapy Evaluation and Treatment    Patient Name: Misa Barajas   MRN: [id number]  Recent Surgery: * No surgery found *      Recommendations:     Discharge Recommendations: Low Intensity Therapy   Discharge Equipment Recommendations: walker, rolling   Barriers to discharge: Increased level of assist, Decreased caregiver support, and Ongoing medical treatment    Assessment:     Misa Barajas is a 78 y.o. female admitted with a medical diagnosis of Acute on chronic diastolic CHF (congestive heart failure). She presents with the following impairments/functional limitations: weakness, impaired endurance, impaired self care skills, impaired functional mobility, gait instability, impaired balance, decreased lower extremity function, decreased safety awareness, pain, decreased ROM, impaired skin, impaired cardiopulmonary response to activity. Patient requires low intensity therapy upon discharge from hospital.     Rehab Prognosis: Good; patient would benefit from acute PT services to address these deficits and reach maximum level of function.    Plan:     During this hospitalization, patient to be seen 3 x/week to address the above listed problems via gait training, therapeutic activities, therapeutic exercises, neuromuscular re-education    Plan of Care Expires: 05/25/24    Subjective     Chief Complaint: Patient has left knee pain today being a 6/10 but is agreeable to participate in physical therapy. Pt notified GINGER Cesar regarding her left knee pain.   Patient Comments/Goals: Are to get stronger  Pain/Comfort:  Pain Rating 1: 6/10  Location - Side 1: Left  Location - Orientation 1: generalized  Location 1: knee  Pain Addressed 1: Reposition, Distraction, Nurse notified  Pain Rating Post-Intervention 1: 6/10    Social History:  Living Environment: Patient lives alone in a single story home with 1 threshold step to enter.  Prior Level of Function: Prior to admission, patient was modified independent with ADLs  using standard walker for mobility and not driving and retired. Patient reports her daughters are able to help and drive her around when needed.  Equipment Used at Home: bedside commode, oxygen, walker, standard  DME owned (not currently used): none  Assistance Upon Discharge: daughters    Objective:     Communicated with GINGER Cesar prior to session. Patient found HOB elevated with oxygen, PureWick, telemetry upon PT entry to room.    General Precautions: Standard, fall   Orthopedic Precautions: N/A   Braces: N/A    Respiratory Status: Nasal cannula, flow 2 L/min    Exams:  Cognition: Patient is oriented to Person, Place, Time, Situation  Right lower extremity range of motion: WFL  Right lower extremity strength:   Area MMT Grade   Hip Flexion 3+/5   Knee Extension 3+/5   Plantarflexion 4-/5   Dorsiflexion 4-/5   Left lower extremity range of motion: WFL  Left lower extremity strength:   Area MMT Grade   Hip Flexion 3+/5   Knee Extension 3+/5   Plantarflexion 4-/5   Dorsiflexion 4-/5   Fine Motor Coordination:  -       Intact: RLE heel shin, LLE heel shin  Gross Motor Coordination: WFL   Postural Exam: Patient presented with the following abnormalities:  -       Rounded shoulders  -       Forward head  Sensation:    -       Impaired: light/touch on bilateral toes  Skin Integrity/Edema:     Skin integrity: Visible skin intact, Thin, and Dry  Edema: None noted    Functional Mobility:  Bed Mobility  Rolling Left: contact guard assistance using bed rails  Rolling Right: contact guard assistance using bed rails  Scooting: contact guard assistance  Supine to Sit: contact guard assistance for trunk management with an extended amount of time to perform  Transfers  Sit to Stand: minimum assistance with rolling walker and with cues for hand placement  Bed to Chair: minimum assistance with rolling walker and with cues for hand placement using Stand Pivot  Gait  Patient ambulated 5 steps in room with rolling walker and minimum  assistance. Patient demonstrates occasional unsteady gait, decreased step length, decreased weight shift, ambulates outside VITOR of RW, flexed posture, decreased faith, and antalgic gait. All lines remained intact throughout ambulation trail.    Neuromuscular Exercises:  Balance:  Sitting: Performed sitting edge of bed and in bedside chair with bilateral upper extremities as needed and stand by assistance for safety. No loss of balance noted.  Standing: Performed with rolling walker and minimum assistance. No loss of balance noted.     Therapeutic Exercises:   Patient performed 5 repetitions of the following standing exercises: marches for bilateral LE. Patient required skilled PT for instruction of exercises and appropriate cues to perform exercises safely and appropriately.    Therapeutic Activities:  See bed mobility and transfers above.    Patient Education:  Patient educated on role of acute care PT and PT POC, safety while in hospital including calling nurse for mobility, and call light usage.  Patient educated about importance of out of bed mobility and remaining up in chair most of the day.  Patient educated about pursed lip breathing technique and cued for use with mobility.     AM-PAC 6 CLICK MOBILITY  Total Score:17    Patient left in bedside chair with all lines intact, call button in reach, and RN notified.    GOALS:   Multidisciplinary Problems       Physical Therapy Goals          Problem: Physical Therapy    Goal Priority Disciplines Outcome Goal Variances Interventions   Physical Therapy Goal     PT, PT/OT Progressing     Description: Goals to be met by: 2024     Patient will increase functional independence with mobility by performin. Sit to stand transfer with Stand-by Assistance. -In progress  2. Bed to chair transfer with Stand-by Assistance using Rolling Walker. -In progress  3. Gait x 25 feet with Stand-by Assistance using Rolling Walker. -In progress   4. Lower extremity  exercise program x20 reps per handout, with supervision. -In progress                       History:     Past Medical History:   Diagnosis Date    Acute hypoxemic respiratory failure 6/26/2021    Acute superficial venous thrombosis of lower extremity, bilateral 1/25/2022    Aortic atherosclerosis     Arthritis     knee joint pain    Asthma-COPD overlap syndrome 8/22/2022    Breast cancer 2002    left breast & lymph nodes-s/p sx with chemo    Bronchitis     with flu-2/2014    Cataracts, bilateral     Chronic anticoagulation 5/4/2022    Chronic diastolic heart failure 12/24/2019    Chronic kidney disease, stage 3     History of chemotherapy     last treatment 12/2002 (had 8 treatments)    Hyperlipidemia 11/20/2016    Hypertension     Lymphedema of both lower extremities 1/25/2022    Nephrolithiasis 6/2/2014    Osteoporosis     Paroxysmal atrial fibrillation 6/7/2021    Renal calculi     hematuria    Renal osteodystrophy 1/14/2016    Venous stasis dermatitis of both lower extremities 1/25/2022    Vitamin D insufficiency        Past Surgical History:   Procedure Laterality Date    BREAST BIOPSY Left 2002    core bx, +    BREAST BIOPSY Right 2018    core    BREAST BIOPSY Right 2019    BREAST LUMPECTOMY Left 2002    CYSTOSCOPY  4/28/2022    Procedure: CYSTOSCOPY;  Surgeon: Vik Ware MD;  Location: [facility] OR [address] [place] [address];  Service: Urology;;    CYSTOSCOPY  5/30/2022    Procedure: CYSTOSCOPY;  Surgeon: Bonnie Knutson MD;  Location: [facility] OR [address] [place] [address];  Service: Urology;;    LASER LITHOTRIPSY  5/30/2022    Procedure: LITHOTRIPSY, USING LASER;  Surgeon: Bonnie Knutson MD;  Location: [facility] OR [address] [place] [address];  Service: Urology;;    LITHOTRIPSY      MASTECTOMY Left 06/2002    left-& lymph node dissection    REPLACEMENT OF STENT Right 5/30/2022    Procedure: REPLACEMENT, STENT;  Surgeon: Bonnie Knutson MD;  Location: [facility] OR [address] [place] [address];  Service: Urology;  Laterality: Right;    RETROGRADE PYELOGRAPHY Right 4/28/2022     Procedure: PYELOGRAM, RETROGRADE;  Surgeon: Vik Ware MD;  Location: [facility] OR 1ST FLR;  Service: Urology;  Laterality: Right;    ROBOT-ASSISTED LAPAROSCOPIC PARTIAL NEPHRECTOMY USING DA JESÚS XI Right 3/11/2021    Procedure: XI ROBOTIC NEPHRECTOMY, PARTIAL;  Surgeon: Taz Ortega MD;  Location: [facility] OR 2ND FLR;  Service: Urology;  Laterality: Right;  4hr/ gen with regional  [place] confirmation:  [id number] for 11:15am case [place]    URETEROSCOPIC REMOVAL OF URETERIC CALCULUS Right 5/30/2022    Procedure: REMOVAL, CALCULUS, URETER, URETEROSCOPIC;  Surgeon: Bonnie Knutson MD;  [facility];  Service: Urology;  Laterality: Right;    ureteroscopy Bilateral     6.14    URETEROSCOPY Right 5/30/2022    Procedure: URETEROSCOPY;  Surgeon: Bonnie Knutson MD;  [facility];  Service: Urology;  Laterality: Right;       Time Tracking:     PT Received On: 05/11/24  PT Start Time: 1037  PT Stop Time: 1055  PT Total Time (min): 18 min     Billable Minutes: Evaluation 10 and Therapeutic Activity 8    5/11/2024

## 2024-05-11 NOTE — PLAN OF CARE
Problem: Adult Inpatient Plan of Care  Goal: Plan of Care Review  Outcome: Progressing  Goal: Patient-Specific Goal (Individualized)  Outcome: Progressing     Problem: Acute Kidney Injury/Impairment  Goal: Improved Oral Intake  Outcome: Progressing

## 2024-05-11 NOTE — ASSESSMENT & PLAN NOTE
Patient has Abnormal Phosphorus: hypophosphatemia. Will continue to monitor electrolytes closely. Will replace the affected electrolytes and repeat labs to be done after interventions completed. The patient's phosphorus results have been reviewed and are listed below.  Recent Labs   Lab 05/11/24  0835   PHOS 3.1

## 2024-05-11 NOTE — ASSESSMENT & PLAN NOTE
- patient with urinary retention on admission requiring straight cath   - q6hr bladder scans ordered   - flomax initiated   - mobilize patient as she has not been out of bed since admission   -  flomax initiated   - orosco placed due to continued urinary retention  - schedule FU with urology at discharge

## 2024-05-11 NOTE — PLAN OF CARE
[facility]  Discharge Final Note    Primary Care Provider: Celia Carlisle MD    Expected Discharge Date: 5/11/2024    Patient cleared for discharge from case management standpoint.    Final Discharge Note (most recent)       Final Note - 05/11/24 1548          Final Note    Assessment Type Final Discharge Note     Anticipated Discharge Disposition Home-Health Care [facility]     What phone number can be called within the next 1-3 days to see how you are doing after discharge? [phone]     Hospital Resources/Appts/Education Provided Provided patient/caregiver with written discharge plan information;Appointments scheduled and added to AVS;Post-Acute resouces added to AVS        Post-Acute Status    Post-Acute Authorization Home Health     Home Health Status Set-up Complete/Auth obtained     Discharge Delays None known at this time                   Important Message from Medicare         Contact Info       Celia Carlisle MD   Specialty: Internal Medicine   Relationship: PCP - General  Consulting Physician    [address]  [place] [address]   Phone: [phone]       Next Steps: Schedule an appointment as soon as possible for a visit in 1 week(s)    [facility]   Specialty: Home Health Services, Home Therapy Services, Home Living Aide Services    [address]  [facility] [place] [address]   Phone: [phone]       Next Steps: Follow up in 2 day(s)    Instructions: Current with agency. Staff will contact patient with resumption of care visit.          Future Appointments   Date Time Provider Department Center   5/22/2024  2:30 PM Steph Parekh PA-C [facility] CARDIO [facility]   5/28/2024  1:15 PM EKG, APPT [facility] EKG [facility]   5/28/2024  2:00 PM [facility]  Weekend  - [facility] [place]  Spectralink: [phone]

## 2024-05-11 NOTE — PLAN OF CARE
[facility] - Observation [facility]      HOME HEALTH ORDERS  FACE TO FACE ENCOUNTER    Patient Name: Misa Barajas  YOB: 1945    PCP: Celia Carlisle MD   PCP Address: [address] [place] [address] / [place] [id number]  PCP Phone Number: [phone]  PCP Fax: [phone]    Encounter Date: 5/7/24    Admit to Home Health    Diagnoses:  Active Hospital Problems    Diagnosis  POA    *Acute on chronic diastolic CHF (congestive heart failure) [I50.33]  Yes    Acute cystitis [N30.00]  Yes     Priority: 2     Urinary retention [R33.9]  Yes     Priority: 3     Hypophosphatemia [E83.39]  No    Elevated troponin [R79.89]  Yes    Cellulitis of right lower extremity [L03.115]  Yes    Acute renal failure superimposed on stage 3a chronic kidney disease [N17.9, N18.31]  Yes    Chronic respiratory failure with hypoxia [J96.11]  Yes    Centrilobular emphysema [J43.2]  Yes    Paroxysmal atrial fibrillation [I48.0]  Yes    Hyperlipidemia [E78.5]  Yes    Essential hypertension [I10]  Yes      Resolved Hospital Problems   No resolved problems to display.       Follow Up Appointments:  Future Appointments   Date Time Provider Department Center   5/22/2024  2:30 PM Steph Parekh PA-C [facility] CARDIO [facility]   5/28/2024  1:15 PM EKG, APPT [facility]   5/28/2024  2:00 PM Taz Church MD [facility]       Allergies:  Review of patient's allergies indicates:   Allergen Reactions    Adhesive Rash     Pt states she removed a LATEX bandaid and the skin beneath was swollen and red. No other latex causes a reaction.       Medications: Review discharge medications with patient and family and provide education.    Current Facility-Administered Medications   Medication Dose Route Frequency Provider Last Rate Last Admin    acetaminophen tablet 1,000 mg  1,000 mg Oral Q8H PRN Adina Wolff PA-C   1,000 mg at 05/11/24 1133    acetaminophen tablet 650 mg  650 mg Oral Q4H PRN Emmy Loyola PA-C   650 mg at  05/10/24 0305    albuterol-ipratropium 2.5 mg-0.5 mg/3 mL nebulizer solution 3 mL  3 mL Nebulization Q4H PRN Emmy Loyola PA-C        amoxicillin-clavulanate 875-125mg per tablet 1 tablet  1 tablet Oral Q12H Adina Wolff PA-C   1 tablet at 05/11/24 0919    apixaban tablet 2.5 mg  2.5 mg Oral BID Emmy Loyola PA-C   2.5 mg at 05/11/24 0919    aspirin EC tablet 81 mg  81 mg Oral Daily Emmy Loyola PA-C   81 mg at 05/11/24 0920    atorvastatin tablet 20 mg  20 mg Oral Daily Emmy Loyola PA-C   20 mg at 05/11/24 0919    bisacodyL suppository 10 mg  10 mg Rectal Daily PRN Emmy Loyola PA-C        dextrose 10% bolus 125 mL 125 mL  12.5 g Intravenous PRN Emmy Loyola PA-C        dextrose 10% bolus 250 mL 250 mL  25 g Intravenous PRN Emmy Loyola PA-C        fluticasone furoate-vilanteroL 100-25 mcg/dose diskus inhaler 1 puff  1 puff Inhalation Daily Adina Wolff PA-C   1 puff at 05/11/24 0749    furosemide injection 40 mg  40 mg Intravenous BID Adina Wolff PA-C        glucagon (human recombinant) injection 1 mg  1 mg Intramuscular PRN Emmy Loyola PA-C        glucose chewable tablet 16 g  16 g Oral PRN Emmy Loyola PA-C        glucose chewable tablet 24 g  24 g Oral PRN Emmy Loyola PA-C        melatonin tablet 6 mg  6 mg Oral Nightly PRN Emmy Loyola PA-C        methocarbamoL tablet 500 mg  500 mg Oral QID PRN Adina Wolff PA-C        metoprolol succinate (TOPROL-XL) 24 hr tablet 100 mg  100 mg Oral Daily Emmy Loyola PA-C   100 mg at 05/11/24 1133    miconazole NITRATE 2 % top powder   Topical (Top) BID Adina Wolff PA-C   Given at 05/11/24 0920    ondansetron disintegrating tablet 8 mg  8 mg Oral Q8H PRN Emmy Loyola PA-C        polyethylene glycol packet 17 g  17 g Oral Daily Adina Wolff PA-C   17 g at 05/11/24 0920    promethazine tablet 25 mg  25 mg Oral Q6H PRN Nir  Emmy VELAZQUEZ PA-C        sodium chloride 0.9% flush 10 mL  10 mL Intravenous PRN Emmy Loyola PA-C        sulfamethoxazole-trimethoprim 800-160mg per tablet 1 tablet  1 tablet Oral BID Adina Wolff PA-C   1 tablet at 05/11/24 0920    tamsulosin 24 hr capsule 0.4 mg  0.4 mg Oral Daily Adina Wolff PA-C   0.4 mg at 05/11/24 0919     Current Discharge Medication List        START taking these medications    Details   amoxicillin-clavulanate 875-125mg (AUGMENTIN) 875-125 mg per tablet Take 1 tablet by mouth every 12 (twelve) hours. for 2 days  Qty: 4 tablet, Refills: 0      sulfamethoxazole-trimethoprim 800-160mg (BACTRIM DS) 800-160 mg Tab Take 1 tablet by mouth 2 (two) times daily. for 2 days  Qty: 4 tablet, Refills: 0      tamsulosin (FLOMAX) 0.4 mg Cap Take 1 capsule (0.4 mg total) by mouth once daily.  Qty: 30 capsule, Refills: 11           CONTINUE these medications which have CHANGED    Details   furosemide (LASIX) 40 MG tablet Take 2 tablets (80 mg total) by mouth once daily.  Qty: 60 tablet, Refills: 11           CONTINUE these medications which have NOT CHANGED    Details   acetaminophen (TYLENOL) 500 MG tablet Take 2 tablets (1,000 mg total) by mouth every 8 (eight) hours as needed for Pain.  Qty: 42 tablet, Refills: 0      apixaban (ELIQUIS) 5 mg Tab Take 1 tablet (5 mg total) by mouth 2 (two) times a day.  Qty: 180 tablet, Refills: 3      aspirin (ECOTRIN) 81 MG EC tablet Take 81 mg by mouth once daily.      biotin 1 mg tablet Take 1,000 mcg by mouth once daily.      budesonide-glycopyr-formoterol (BREZTRI AEROSPHERE) 160-9-4.8 mcg/actuation HFAA Inhale 2 puffs into the lungs 2 (two) times daily.  Qty: 32.1 g, Refills: 3    Associated Diagnoses: Asthma-COPD overlap syndrome      busPIRone (BUSPAR) 5 MG Tab Take 5 mg by mouth as needed.      empagliflozin (JARDIANCE) 10 mg tablet Take 1 tablet (10 mg total) by mouth once daily.  Qty: 90 tablet, Refills: 3      fluticasone propionate (FLONASE)  50 mcg/actuation nasal spray 1 spray by Each Nostril route daily as needed (congestion).      inhalation spacing device Use as directed for inhalation.  Qty: 1 Device, Refills: 0      losartan (COZAAR) 50 MG tablet Take 1 tablet (50 mg total) by mouth once daily.  Qty: 90 tablet, Refills: 1    Comments: .  Associated Diagnoses: Chronic diastolic heart failure      metoprolol succinate (TOPROL-XL) 100 MG 24 hr tablet Take 1 tablet (100 mg total) by mouth once daily.  Qty: 90 tablet, Refills: 3    Comments: .  Associated Diagnoses: Essential hypertension      rosuvastatin (CRESTOR) 5 MG tablet Take 1 tablet (5 mg total) by mouth once daily.  Qty: 90 tablet, Refills: 3    Associated Diagnoses: Hyperlipidemia, unspecified hyperlipidemia type      vitamin D (VITAMIN D3) 1000 units Tab Take 2 tablets (2,000 Units total) by mouth once daily.  Qty: 180 tablet, Refills: 3    Associated Diagnoses: Vitamin D insufficiency               I have seen and examined this patient within the last 30 days. My clinical findings that support the need for the home health skilled services and home bound status are the following:no   Weakness/numbness causing balance and gait disturbance due to Weakness/Debility making it taxing to leave home.     Diet:   cardiac diet, fluid restriction, and 2 gram sodium diet    Labs:  N/a    Referrals/ Consults  Physical Therapy to evaluate and treat. Evaluate for home safety and equipment needs; Establish/upgrade home exercise program. Perform / instruct on therapeutic exercises, gait training, transfer training, and Range of Motion.  Occupational Therapy to evaluate and treat. Evaluate home environment for safety and equipment needs. Perform/Instruct on transfers, ADL training, ROM, and therapeutic exercises.  Aide to provide assistance with personal care, ADLs, and vital signs.    Activities:   activity as tolerated    Nursing:   Agency to admit patient within 24 hours of hospital discharge unless  specified on physician order or at patient request    SN to complete comprehensive assessment including routine vital signs. Instruct on disease process and s/s of complications to report to MD. Review/verify medication list sent home with the patient at time of discharge  and instruct patient/caregiver as needed. Frequency may be adjusted depending on start of care date.     Skilled nurse to perform up to 3 visits PRN for symptoms related to diagnosis    Notify MD if SBP > 160 or < 90; DBP > 90 or < 50; HR > 120 or < 50; Temp > 101; O2 < 88%    Ok to schedule additional visits based on staff availability and patient request on consecutive days within the home health episode.    When multiple disciplines ordered:    Start of Care occurs on Sunday - Wednesday schedule remaining discipline evaluations as ordered on separate consecutive days following the start of care.    Thursday SOC -schedule subsequent evaluations Friday and Monday the following week.     Friday - Saturday SOC - schedule subsequent discipline evaluations on consecutive days starting Monday of the following week.    For all post-discharge communication and subsequent orders please contact patient's primary care physician. If unable to reach primary care physician or do not receive response within 30 minutes, please contact [facility] for clinical staff order clarification    Miscellaneous   Muniz Care: Instruct patient/caregiver to empty Muniz bag.  Change Muniz every month.  Diabetic Care:   SN to perform and educate Diabetic management with blood glucose monitoring:, Fingerstick blood sugar AC and HS, and Report CBG < 60 or > 350 to physician.    Home Health Aide:  Nursing Twice weekly, Physical Therapy Three times weekly, Occupational Therapy Three times weekly, and Home Health Aide Twice weekly    Wound Care Orders  no    I certify that this patient is confined to her home and needs intermittent skilled nursing care, physical therapy, and  occupational therapy.        Adina Wolff PA-C  Department of [facility] Medicine   [facility] [address]

## 2024-05-11 NOTE — PLAN OF CARE
Problem: Adult Inpatient Plan of Care  Goal: Plan of Care Review  Outcome: Met  Goal: Patient-Specific Goal (Individualized)  Outcome: Met  Goal: Absence of Hospital-Acquired Illness or Injury  Outcome: Met  Goal: Optimal Comfort and Wellbeing  Outcome: Met  Goal: Readiness for Transition of Care  Outcome: Met     Problem: Acute Kidney Injury/Impairment  Goal: Fluid and Electrolyte Balance  Outcome: Met  Goal: Improved Oral Intake  Outcome: Met  Goal: Effective Renal Function  Outcome: Met     Problem: Infection  Goal: Absence of Infection Signs and Symptoms  Outcome: Met     Problem: Wound  Goal: Optimal Coping  Outcome: Met  Goal: Optimal Functional Ability  Outcome: Met  Goal: Absence of Infection Signs and Symptoms  Outcome: Met  Goal: Improved Oral Intake  Outcome: Met  Goal: Optimal Pain Control and Function  Outcome: Met  Goal: Skin Health and Integrity  Outcome: Met  Goal: Optimal Wound Healing  Outcome: Met     Problem: Fall Injury Risk  Goal: Absence of Fall and Fall-Related Injury  Outcome: Met     Problem: Skin Injury Risk Increased  Goal: Skin Health and Integrity  Outcome: Met

## 2024-05-11 NOTE — ASSESSMENT & PLAN NOTE
RESOLVED   - Cr 1.8 on admit, baseline 1.3  - Estimated Creatinine Clearance: 36.4 mL/min (based on SCr of 1.3 mg/dL).  - Likely 2/2 hypervolemia in setting of CHF exacerbation. Should improve with IV diuresis  - renally dose meds  - avoid nephrotoxic insults  - monitor I/Os  - monitor with daily BMP, replete lytes if necessary  - improved to 1.2 this am with diuresis

## 2024-05-11 NOTE — NURSING
Inserted a 16 fr. Orosco catheter prior to pt d/c per MARISA Holden order. Catheter secured with paper tape due to pt allergy to adhesive. No discomfort noted. Drainage bag changed from a standard collection container to a leg bag per pt preference. Pt and daughter education on orosco hygiene and drainage bag maintenance. Pt verbalized understanding. Plan of care continues.

## 2024-05-11 NOTE — PROGRESS NOTES
[facility] [address] [place] [address] Medicine  Progress Note    Patient Name: Misa Barajas  MRN: [id number]  Patient Class: IP- Inpatient   Admission Date: 5/7/2024  Length of Stay: 3 days  Attending Physician: Fiorella Cobian MD  Primary Care Provider: Celia Carlisle MD        Subjective:     Principal Problem:Acute on chronic diastolic CHF (congestive heart failure)        HPI:  Misa Barajas is a 78 y.o. female with a PMHx of HTN, HLD, HFpEF, COPD on home O2 (2L NC baseline O2 sat 92%) and pAF presentswith increasing LE swelling.  She is gained 5 lb in the past 3 days despite increased Lasix use. She also endorses new redness and warmth with associated itching to her RLE. She has small pustules that she noticed to RLE a few days ago.  Denies fever, chills, chest pain, cough, SOB, abdominal pain, n/v/d, dysuria, headaches. Has chronic discoloration to BLEs without recent worsening.     ED: Afebrile . Hr 90s. Satting well on home 2L NC. CBC without leukocytosis or anemia. CMP notable for small MARQUEZ with Cr 1.8 (baseline 1.3) and BUN 26. BNP 1165. Trop 0.032 (0.028 at baseline). Lactic 1.2. CXR with Cardiomegaly with pulmonary vascular congestion and small bilateral pleural effusions, suggestive of pulmonary edema secondary to CHF. Given rocephin, toprol 100 mg and IV lasix 60 mg. Placed in observation for CHF exacerbation.     Overview/Hospital Course:  Misa Barajas is a 78 y.o. F who was admitted to  for further evaluation of acute on chronic diastolic CHF. Diuresing on IV lasix. Echo with EF 55%, unable to assess diastolic function due to a-fib, CVP 15mmHg. Cr 1.8 on admission most likely 2/2 to fluid overloaded, now improved with diuresis. UA infectious, culture with staph aureus, enterococcus faecalis. Monitor susceptibilities. IV rocephin switched to IV vancomycin. Transitioned to oral augmentin and bactrim for discharge. Patient with urinary retention requiring straight cath. Initiated flomax  and bladder scan q6hr. Continued retention - orosco cath placed prior to discharge. Will set up urology FU at discharge. Patient and family hesitant for discharge. BP soft and concerned about requiring to come back to hospital.       Interval History: Patient seen and assessed at bedside. Afebrile, soft blood pressures, otherwise VSS. Patient was seen by me multiple times with family present. Discussed discharge plan with continued course of abx, orosco placement, with increased dose of po lasix for home. I was notified by nursing that family and patient concerned about discharging due to low BP - patient asymptomatic. Discontinued evening dose of lasix. Will monitor vital signs closely and give IVF as necessary.       Review of Systems   Constitutional:  Negative for activity change, chills and fever.   HENT:  Negative for trouble swallowing.    Eyes:  Negative for photophobia and visual disturbance.   Respiratory:  Negative for cough, chest tightness and shortness of breath (improved).    Cardiovascular:  Positive for leg swelling. Negative for chest pain and palpitations.   Gastrointestinal:  Negative for abdominal pain, constipation, diarrhea, nausea and vomiting.   Genitourinary:  Negative for dysuria, frequency and hematuria.   Musculoskeletal:  Negative for back pain, gait problem and neck pain.   Skin:  Positive for color change. Negative for rash and wound.   Neurological:  Negative for dizziness, syncope, speech difficulty and light-headedness.   Psychiatric/Behavioral:  Negative for agitation and confusion. The patient is not nervous/anxious.      Objective:     Vital Signs (Most Recent):  Temp: 98 °F (36.7 °C) (05/11/24 1557)  Pulse: 91 (05/11/24 1557)  Resp: 16 (05/11/24 1557)  BP: (!) 90/48 (05/11/24 1557)  SpO2: (!) 93 % (05/11/24 1557) Vital Signs (24h Range):  Temp:  [98 °F (36.7 °C)-98.6 °F (37 °C)] 98 °F (36.7 °C)  Pulse:  [] 91  Resp:  [16-18] 16  SpO2:  [90 %-96 %] 93 %  BP: ()/(48-70)  90/48     Weight: 85 kg (187 lb 6.3 oz)  Body mass index is 32.17 kg/m².    Intake/Output Summary (Last 24 hours) at 5/11/2024 1845  Last data filed at 5/11/2024 0900  Gross per 24 hour   Intake --   Output 1100 ml   Net -1100 ml         Physical Exam  Vitals and nursing note reviewed.   Constitutional:       General: She is not in acute distress.     Appearance: She is well-developed.   HENT:      Head: Normocephalic and atraumatic.      Mouth/Throat:      Pharynx: No oropharyngeal exudate.   Eyes:      General: No scleral icterus.     Conjunctiva/sclera: Conjunctivae normal.   Cardiovascular:      Rate and Rhythm: Normal rate and regular rhythm.      Heart sounds: Normal heart sounds.   Pulmonary:      Effort: Pulmonary effort is normal. No respiratory distress.      Breath sounds: Normal breath sounds. No wheezing or rales.   Abdominal:      General: Bowel sounds are normal. There is no distension.      Palpations: Abdomen is soft.      Tenderness: There is no abdominal tenderness.   Musculoskeletal:         General: No tenderness. Normal range of motion.      Cervical back: Normal range of motion and neck supple.      Right lower leg: Pitting Edema present.      Left lower leg: Pitting Edema present.   Lymphadenopathy:      Cervical: No cervical adenopathy.   Skin:     General: Skin is warm and dry.      Capillary Refill: Capillary refill takes less than 2 seconds.      Findings: Erythema present. No rash.   Neurological:      Mental Status: She is alert and oriented to person, place, and time.      Cranial Nerves: No cranial nerve deficit.      Sensory: No sensory deficit.      Coordination: Coordination normal.   Psychiatric:         Behavior: Behavior normal.         Thought Content: Thought content normal.         Judgment: Judgment normal.             Significant Labs: All pertinent labs within the past 24 hours have been reviewed.    Significant Imaging: I have reviewed all pertinent imaging  results/findings within the past 24 hours.    Assessment/Plan:      * Acute on chronic diastolic CHF (congestive heart failure)  - last echo with EF 60-65%, GII LV DD  - BNP significantly elevated to 1165  - appears overloaded on exam with BLE edema and increased weight gain over the past few days despite increasing her home lasix from daily to BID  - CXR consistent with pulmonary edema and small bilateral pleural effusions   - continue BB  - Monitor on telemetry.   - Patient is on CHF pathway.  Monitor strict Is&Os and daily weights.  Place on fluid restriction of 1.5 L.   - start IV diuresis with lasix 40 mg BID, increased to 80mg IV BID   - holding IV lasix for soft pressures  - goal UOP of -2L per day   - repeat echo noted below    Results for orders placed during the hospital encounter of 05/07/24    Echo    Interpretation Summary    Left Ventricle: The left ventricle is normal in size. Ventricular mass is normal. Normal wall thickness. Normal wall motion. There is normal systolic function. Ejection fraction by visual approximation is 55%. Unable to assess diastolic function due to atrial fibrillation.    Right Ventricle: Normal right ventricular cavity size. Wall thickness is normal. Systolic function is mildly reduced.    Aortic Valve: The aortic valve is a trileaflet valve. There is moderate aortic valve sclerosis. There is annular calcification present. Mildly restricted motion. No stenosis.    Tricuspid Valve: There is mild to moderate regurgitation.    Pulmonary Artery: The estimated pulmonary artery systolic pressure is 44 mmHg.    IVC/SVC: Elevated venous pressure at 15 mmHg.    Acute cystitis  - UA infectious on admission   - Ucx currently growing staph aureus and enterococcus  - pending susceptibilities of culture  - switched IV rocephin to IV vanc  - bladder scan with 500mL, straight cath performed  - monitor patient for continued urinary retention  - plan to discharge patient on bactrim and  augmentin      Urinary retention  - patient with urinary retention on admission requiring straight cath   - q6hr bladder scans ordered   - flomax initiated   - mobilize patient as she has not been out of bed since admission   -  flomax initiated   - orosco placed due to continued urinary retention  - schedule FU with urology at discharge      Hypophosphatemia  Patient has Abnormal Phosphorus: hypophosphatemia. Will continue to monitor electrolytes closely. Will replace the affected electrolytes and repeat labs to be done after interventions completed. The patient's phosphorus results have been reviewed and are listed below.  Recent Labs   Lab 05/11/24  0835   PHOS 3.1          Cellulitis of right lower extremity  - afebrile without leukocytosis  - start IV rocephin 1g q24hr, switched to IV vanc  - blood cultures pending   - one culture with micrococcus, probable contaminant   - repeat bcx drawn - currently NGTD  - wound care consulted    Elevated troponin  - trop 0.032 on admit, baseline ~0.028  - patient denies chest pain  - likely elevated 2/2 CHF exacerbation  - repeat trop pending --> at baseline  - monitor tele     Acute renal failure superimposed on stage 3a chronic kidney disease  RESOLVED   - Cr 1.8 on admit, baseline 1.3  - Estimated Creatinine Clearance: 36.4 mL/min (based on SCr of 1.3 mg/dL).  - Likely 2/2 hypervolemia in setting of CHF exacerbation. Should improve with IV diuresis  - renally dose meds  - avoid nephrotoxic insults  - monitor I/Os  - monitor with daily BMP, replete lytes if necessary  - improved to 1.2 this am with diuresis    Chronic respiratory failure with hypoxia  - chronic and stable on home 2L    Centrilobular emphysema  Patient's COPD is controlled currently.  Patient is currently off COPD Pathway. Continue scheduled inhalers Supplemental oxygen and monitor respiratory status closely.   - satting well on home 2L NC    Hyperlipidemia  - continue statin     Paroxysmal atrial  fibrillation  Patient with Paroxysmal (<7 days) atrial fibrillation which is controlled currently with Beta Blocker. Patient is currently in atrial fibrillation.[id number] Score: 3. Anticoagulation indicated. Anticoagulation done with eliquis 5 mg BID  .    Essential hypertension  - controlled on admit  - continue toprol 100 mg and losartan 50 mg daily  - IV diuresis as above  - patient with soft pressures today, holding further diuretics   - patient asymptomatic  - monitor patient overnight for improvement      VTE Risk Mitigation (From admission, onward)           Ordered     apixaban tablet 2.5 mg  2 times daily         05/08/24 0050     IP VTE HIGH RISK PATIENT  Once         05/07/24 2029     Reason for No Pharmacological VTE Prophylaxis  Once        Question:  Reasons:  Answer:  Already adequately anticoagulated on oral Anticoagulants    05/07/24 2029     Place sequential compression device  Until discontinued         05/07/24 2024                    Discharge Planning   NOHEMI: 5/13/2024     Code Status: Full Code   Is the patient medically ready for discharge?:     Reason for patient still in hospital (select all that apply): Patient trending condition and Treatment  Discharge Plan A: Home Health   Discharge Delays: None known at this time              Adina Wolff PA-C  Department of Hospital Medicine   [facility]

## 2024-05-12 PROBLEM — E83.41 HYPERMAGNESEMIA: Status: ACTIVE | Noted: 2024-05-12

## 2024-05-12 LAB
ANION GAP SERPL CALC-SCNC: 9 MMOL/L (ref 8–16)
ANION GAP SERPL CALC-SCNC: 9 MMOL/L (ref 8–16)
BUN SERPL-MCNC: 29 MG/DL (ref 8–23)
BUN SERPL-MCNC: 31 MG/DL (ref 8–23)
CALCIUM SERPL-MCNC: 9.7 MG/DL (ref 8.7–10.5)
CALCIUM SERPL-MCNC: 9.8 MG/DL (ref 8.7–10.5)
CHLORIDE SERPL-SCNC: 104 MMOL/L (ref 95–110)
CHLORIDE SERPL-SCNC: 104 MMOL/L (ref 95–110)
CO2 SERPL-SCNC: 26 MMOL/L (ref 23–29)
CO2 SERPL-SCNC: 27 MMOL/L (ref 23–29)
CREAT SERPL-MCNC: 1.8 MG/DL (ref 0.5–1.4)
CREAT SERPL-MCNC: 1.9 MG/DL (ref 0.5–1.4)
ERYTHROCYTE [DISTWIDTH] IN BLOOD BY AUTOMATED COUNT: 13.8 % (ref 11.5–14.5)
EST. GFR  (NO RACE VARIABLE): 26.4 ML/MIN/1.73 M^2
EST. GFR  (NO RACE VARIABLE): 28.1 ML/MIN/1.73 M^2
GLUCOSE SERPL-MCNC: 122 MG/DL (ref 70–110)
GLUCOSE SERPL-MCNC: 94 MG/DL (ref 70–110)
HCT VFR BLD AUTO: 37.6 % (ref 37–48.5)
HGB BLD-MCNC: 11.7 G/DL (ref 12–16)
MAGNESIUM SERPL-MCNC: 2.2 MG/DL (ref 1.6–2.6)
MAGNESIUM SERPL-MCNC: 3.5 MG/DL (ref 1.6–2.6)
MCH RBC QN AUTO: 30.2 PG (ref 27–31)
MCHC RBC AUTO-ENTMCNC: 31.1 G/DL (ref 32–36)
MCV RBC AUTO: 97 FL (ref 82–98)
PLATELET # BLD AUTO: 187 K/UL (ref 150–450)
PMV BLD AUTO: 10 FL (ref 9.2–12.9)
POTASSIUM SERPL-SCNC: 3.6 MMOL/L (ref 3.5–5.1)
POTASSIUM SERPL-SCNC: 3.7 MMOL/L (ref 3.5–5.1)
RBC # BLD AUTO: 3.88 M/UL (ref 4–5.4)
SODIUM SERPL-SCNC: 139 MMOL/L (ref 136–145)
SODIUM SERPL-SCNC: 140 MMOL/L (ref 136–145)
WBC # BLD AUTO: 8.45 K/UL (ref 3.9–12.7)

## 2024-05-12 PROCEDURE — 94640 AIRWAY INHALATION TREATMENT: CPT

## 2024-05-12 PROCEDURE — 85027 COMPLETE CBC AUTOMATED: CPT

## 2024-05-12 PROCEDURE — 27000221 HC OXYGEN, UP TO 24 HOURS

## 2024-05-12 PROCEDURE — 25000003 PHARM REV CODE 250: Performed by: PHYSICIAN ASSISTANT

## 2024-05-12 PROCEDURE — 25000003 PHARM REV CODE 250: Performed by: HOSPITALIST

## 2024-05-12 PROCEDURE — 25000003 PHARM REV CODE 250

## 2024-05-12 PROCEDURE — 94761 N-INVAS EAR/PLS OXIMETRY MLT: CPT

## 2024-05-12 PROCEDURE — 36415 COLL VENOUS BLD VENIPUNCTURE: CPT | Mod: XB

## 2024-05-12 PROCEDURE — 83735 ASSAY OF MAGNESIUM: CPT

## 2024-05-12 PROCEDURE — 99900035 HC TECH TIME PER 15 MIN (STAT)

## 2024-05-12 PROCEDURE — 80048 BASIC METABOLIC PNL TOTAL CA: CPT | Mod: 91

## 2024-05-12 PROCEDURE — 36415 COLL VENOUS BLD VENIPUNCTURE: CPT | Performed by: PHYSICIAN ASSISTANT

## 2024-05-12 PROCEDURE — 80048 BASIC METABOLIC PNL TOTAL CA: CPT | Performed by: PHYSICIAN ASSISTANT

## 2024-05-12 PROCEDURE — 21400001 HC TELEMETRY ROOM

## 2024-05-12 RX ORDER — AMPICILLIN 500 MG/1
1000 CAPSULE ORAL 2 TIMES DAILY
Status: DISCONTINUED | OUTPATIENT
Start: 2024-05-12 | End: 2024-05-14

## 2024-05-12 RX ORDER — FUROSEMIDE 80 MG/1
80 TABLET ORAL DAILY
Status: DISCONTINUED | OUTPATIENT
Start: 2024-05-13 | End: 2024-05-13

## 2024-05-12 RX ORDER — AMOXICILLIN AND CLAVULANATE POTASSIUM 500; 125 MG/1; MG/1
1 TABLET, FILM COATED ORAL 2 TIMES DAILY
Status: DISCONTINUED | OUTPATIENT
Start: 2024-05-12 | End: 2024-05-12

## 2024-05-12 RX ADMIN — SULFAMETHOXAZOLE AND TRIMETHOPRIM 1 TABLET: 800; 160 TABLET ORAL at 09:05

## 2024-05-12 RX ADMIN — APIXABAN 2.5 MG: 2.5 TABLET, FILM COATED ORAL at 09:05

## 2024-05-12 RX ADMIN — METOPROLOL SUCCINATE 100 MG: 100 TABLET, EXTENDED RELEASE ORAL at 09:05

## 2024-05-12 RX ADMIN — ASPIRIN 81 MG: 81 TABLET, COATED ORAL at 09:05

## 2024-05-12 RX ADMIN — AMPICILLIN 1000 MG: 500 CAPSULE ORAL at 12:05

## 2024-05-12 RX ADMIN — ATORVASTATIN CALCIUM 20 MG: 20 TABLET, FILM COATED ORAL at 09:05

## 2024-05-12 RX ADMIN — MICONAZOLE NITRATE 2 % TOPICAL POWDER: at 09:05

## 2024-05-12 RX ADMIN — AMPICILLIN 1000 MG: 500 CAPSULE ORAL at 09:05

## 2024-05-12 RX ADMIN — POLYETHYLENE GLYCOL 3350 17 G: 17 POWDER, FOR SOLUTION ORAL at 09:05

## 2024-05-12 RX ADMIN — FLUTICASONE FUROATE AND VILANTEROL TRIFENATATE 1 PUFF: 100; 25 POWDER RESPIRATORY (INHALATION) at 08:05

## 2024-05-12 RX ADMIN — SODIUM CHLORIDE 250 ML: 9 INJECTION, SOLUTION INTRAVENOUS at 09:05

## 2024-05-12 RX ADMIN — TAMSULOSIN HYDROCHLORIDE 0.4 MG: 0.4 CAPSULE ORAL at 09:05

## 2024-05-12 NOTE — ASSESSMENT & PLAN NOTE
- Cr 1.8 on admit, baseline 1.3  - Estimated Creatinine Clearance: 24.8 mL/min (A) (based on SCr of 1.9 mg/dL (H)).  - Likely 2/2 hypervolemia in setting of CHF exacerbation. Should improve with IV diuresis  - renally dose meds  - avoid nephrotoxic insults  - monitor I/Os  - monitor with daily BMP, replete lytes if necessary  - improved to 1.2 with diuresis  - new Cr bump of 1.9 on am labs, most likely 2/2 diuresis, abx administration  - given x2 250cc IVF bolus since last night, repeat labs

## 2024-05-12 NOTE — ASSESSMENT & PLAN NOTE
- controlled on admit  - continue toprol 100 mg and losartan 50 mg daily  - IV diuresis as above  - patient with soft pressures, holding further diuretics until tomorrow am  - patient asymptomatic

## 2024-05-12 NOTE — PLAN OF CARE
Update at 5:00 PM  Updated patient and daughters at bedside regarding discharge appeal. NOHEMI adjusted to 5/13/24.    CM followed up on discharge/appeal status. Reviewed chart. Discharge order cancelled by care team Sat. evening (5/11/24) due to low BP. Since patient was no longer medically ready, no appeal was necessary. Attending MD's update, patient has MARQUEZ again and will treat today. CM will continue to follow.      Savanah Lorenzo RN  Weekend  - [facility] [place]  Spectralink: [phone]

## 2024-05-12 NOTE — PLAN OF CARE
Patient appealed discharge.     05/11/24 1910   Medicare Message   Important Message from Medicare regarding Discharge Appeal Rights Given to patient/caregiver;Explained to patient/caregiver;Signed/date by patient/caregiver   Date IMM was signed 05/11/24   Time IMM was signed 1817         Savanah Lorenzo RN  Weekend  - [facility] [place]  Spectralink: [phone]

## 2024-05-12 NOTE — SUBJECTIVE & OBJECTIVE
Interval History: Patient seen and assessed at bedside. Afebrile, VSS. BP improved with small IVF boluses. Held lasix this am. Cr bump of 1.9, mag 3.5 on am labs. Repeat following fluid administration. Monitor VS. Continue abx for now.       Review of Systems   Constitutional:  Negative for activity change, chills and fever.   HENT:  Negative for trouble swallowing.    Eyes:  Negative for photophobia and visual disturbance.   Respiratory:  Negative for cough, chest tightness and shortness of breath (improved).    Cardiovascular:  Positive for leg swelling. Negative for chest pain and palpitations.   Gastrointestinal:  Negative for abdominal pain, constipation, diarrhea, nausea and vomiting.   Genitourinary:  Negative for dysuria, frequency and hematuria.   Musculoskeletal:  Negative for back pain, gait problem and neck pain.   Skin:  Positive for color change. Negative for rash and wound.   Neurological:  Negative for dizziness, syncope, speech difficulty and light-headedness.   Psychiatric/Behavioral:  Negative for agitation and confusion. The patient is not nervous/anxious.      Objective:     Vital Signs (Most Recent):  Temp: 97.6 °F (36.4 °C) (05/12/24 1146)  Pulse: (!) 121 (05/12/24 1146)  Resp: 16 (05/12/24 1146)  BP: 113/67 (05/12/24 1146)  SpO2: 96 % (05/12/24 1146) Vital Signs (24h Range):  Temp:  [97.6 °F (36.4 °C)-98.2 °F (36.8 °C)] 97.6 °F (36.4 °C)  Pulse:  [] 121  Resp:  [16-18] 16  SpO2:  [91 %-96 %] 96 %  BP: ()/(32-67) 113/67     Weight: 84.2 kg (185 lb 10 oz)  Body mass index is 31.86 kg/m².    Intake/Output Summary (Last 24 hours) at 5/12/2024 9409  Last data filed at 5/12/2024 0629  Gross per 24 hour   Intake 460 ml   Output 300 ml   Net 160 ml         Physical Exam  Vitals and nursing note reviewed.   Constitutional:       General: She is not in acute distress.     Appearance: She is well-developed.   HENT:      Head: Normocephalic and atraumatic.      Mouth/Throat:      Pharynx: No  oropharyngeal exudate.   Eyes:      General: No scleral icterus.     Conjunctiva/sclera: Conjunctivae normal.   Cardiovascular:      Rate and Rhythm: Normal rate and regular rhythm.      Heart sounds: Normal heart sounds.   Pulmonary:      Effort: Pulmonary effort is normal. No respiratory distress.      Breath sounds: Normal breath sounds. No wheezing or rales.   Abdominal:      General: Bowel sounds are normal. There is no distension.      Palpations: Abdomen is soft.      Tenderness: There is no abdominal tenderness.   Musculoskeletal:         General: No tenderness. Normal range of motion.      Cervical back: Normal range of motion and neck supple.      Right lower leg: Pitting Edema present.      Left lower leg: Pitting Edema present.   Lymphadenopathy:      Cervical: No cervical adenopathy.   Skin:     General: Skin is warm and dry.      Capillary Refill: Capillary refill takes less than 2 seconds.      Findings: Erythema present. No rash.   Neurological:      Mental Status: She is alert and oriented to person, place, and time.      Cranial Nerves: No cranial nerve deficit.      Sensory: No sensory deficit.      Coordination: Coordination normal.   Psychiatric:         Behavior: Behavior normal.         Thought Content: Thought content normal.         Judgment: Judgment normal.             Significant Labs: All pertinent labs within the past 24 hours have been reviewed.    Significant Imaging: I have reviewed all pertinent imaging results/findings within the past 24 hours.

## 2024-05-12 NOTE — ASSESSMENT & PLAN NOTE
- last echo with EF 60-65%, GII LV DD  - BNP significantly elevated to 1165  - appears overloaded on exam with BLE edema and increased weight gain over the past few days despite increasing her home lasix from daily to BID  - CXR consistent with pulmonary edema and small bilateral pleural effusions   - continue BB  - Monitor on telemetry.   - Patient is on CHF pathway.  Monitor strict Is&Os and daily weights.  Place on fluid restriction of 1.5 L.   - start IV diuresis with lasix 40 mg BID, increased to 80mg IV BID   - holding IV lasix for soft pressures, resume tomorrow  - goal UOP of -2L per day   - repeat echo noted below    Results for orders placed during the hospital encounter of 05/07/24    Echo    Interpretation Summary    Left Ventricle: The left ventricle is normal in size. Ventricular mass is normal. Normal wall thickness. Normal wall motion. There is normal systolic function. Ejection fraction by visual approximation is 55%. Unable to assess diastolic function due to atrial fibrillation.    Right Ventricle: Normal right ventricular cavity size. Wall thickness is normal. Systolic function is mildly reduced.    Aortic Valve: The aortic valve is a trileaflet valve. There is moderate aortic valve sclerosis. There is annular calcification present. Mildly restricted motion. No stenosis.    Tricuspid Valve: There is mild to moderate regurgitation.    Pulmonary Artery: The estimated pulmonary artery systolic pressure is 44 mmHg.    IVC/SVC: Elevated venous pressure at 15 mmHg.

## 2024-05-12 NOTE — PROGRESS NOTES
[facility] [address] [place] [address] Medicine  Progress Note    Patient Name: Misa Barajas  MRN: [id number]  Patient Class: IP- Inpatient   Admission Date: 5/7/2024  Length of Stay: 4 days  Attending Physician: Fiorella Ignacio MD  Primary Care Provider: Celia Carlisle MD        Subjective:     Principal Problem:Acute on chronic diastolic CHF (congestive heart failure)        HPI:  Misa Barajas is a 78 y.o. female with a PMHx of HTN, HLD, HFpEF, COPD on home O2 (2L NC baseline O2 sat 92%) and pAF presentswith increasing LE swelling.  She is gained 5 lb in the past 3 days despite increased Lasix use. She also endorses new redness and warmth with associated itching to her RLE. She has small pustules that she noticed to RLE a few days ago.  Denies fever, chills, chest pain, cough, SOB, abdominal pain, n/v/d, dysuria, headaches. Has chronic discoloration to BLEs without recent worsening.     ED: Afebrile . Hr 90s. Satting well on home 2L NC. CBC without leukocytosis or anemia. CMP notable for small MARQUEZ with Cr 1.8 (baseline 1.3) and BUN 26. BNP 1165. Trop 0.032 (0.028 at baseline). Lactic 1.2. CXR with Cardiomegaly with pulmonary vascular congestion and small bilateral pleural effusions, suggestive of pulmonary edema secondary to CHF. Given rocephin, toprol 100 mg and IV lasix 60 mg. Placed in observation for CHF exacerbation.     Overview/Hospital Course:  Misa Barajas is a 78 y.o. F who was admitted to  for further evaluation of acute on chronic diastolic CHF. Diuresing on IV lasix. Echo with EF 55%, unable to assess diastolic function due to a-fib, CVP 15mmHg. Cr 1.8 on admission most likely 2/2 to fluid overloaded, now improved with diuresis. UA infectious, culture with staph aureus, enterococcus faecalis. Monitor susceptibilities. IV rocephin switched to IV vancomycin. Transitioned to oral augmentin and bactrim for discharge. Patient with urinary retention requiring straight cath. Initiated flomax  and bladder scan q6hr. Continued retention - orosco cath placed prior to discharge. Will set up urology FU at discharge. Soft BP, improving with gentle IVF. Cr bump of 1.9, possibly 2/2 over-diuresis or abx. Will monitor.        Interval History: Patient seen and assessed at bedside. Afebrile, VSS. BP improved with small IVF boluses. Held lasix this am. Cr bump of 1.9, mag 3.5 on am labs. Repeat following fluid administration. Monitor VS. Continue abx for now.       Review of Systems   Constitutional:  Negative for activity change, chills and fever.   HENT:  Negative for trouble swallowing.    Eyes:  Negative for photophobia and visual disturbance.   Respiratory:  Negative for cough, chest tightness and shortness of breath (improved).    Cardiovascular:  Positive for leg swelling. Negative for chest pain and palpitations.   Gastrointestinal:  Negative for abdominal pain, constipation, diarrhea, nausea and vomiting.   Genitourinary:  Negative for dysuria, frequency and hematuria.   Musculoskeletal:  Negative for back pain, gait problem and neck pain.   Skin:  Positive for color change. Negative for rash and wound.   Neurological:  Negative for dizziness, syncope, speech difficulty and light-headedness.   Psychiatric/Behavioral:  Negative for agitation and confusion. The patient is not nervous/anxious.      Objective:     Vital Signs (Most Recent):  Temp: 97.6 °F (36.4 °C) (05/12/24 1146)  Pulse: (!) 121 (05/12/24 1146)  Resp: 16 (05/12/24 1146)  BP: 113/67 (05/12/24 1146)  SpO2: 96 % (05/12/24 1146) Vital Signs (24h Range):  Temp:  [97.6 °F (36.4 °C)-98.2 °F (36.8 °C)] 97.6 °F (36.4 °C)  Pulse:  [] 121  Resp:  [16-18] 16  SpO2:  [91 %-96 %] 96 %  BP: ()/(32-67) 113/67     Weight: 84.2 kg (185 lb 10 oz)  Body mass index is 31.86 kg/m².    Intake/Output Summary (Last 24 hours) at 5/12/2024 5043  Last data filed at 5/12/2024 0629  Gross per 24 hour   Intake 460 ml   Output 300 ml   Net 160 ml         Physical  Exam  Vitals and nursing note reviewed.   Constitutional:       General: She is not in acute distress.     Appearance: She is well-developed.   HENT:      Head: Normocephalic and atraumatic.      Mouth/Throat:      Pharynx: No oropharyngeal exudate.   Eyes:      General: No scleral icterus.     Conjunctiva/sclera: Conjunctivae normal.   Cardiovascular:      Rate and Rhythm: Normal rate and regular rhythm.      Heart sounds: Normal heart sounds.   Pulmonary:      Effort: Pulmonary effort is normal. No respiratory distress.      Breath sounds: Normal breath sounds. No wheezing or rales.   Abdominal:      General: Bowel sounds are normal. There is no distension.      Palpations: Abdomen is soft.      Tenderness: There is no abdominal tenderness.   Musculoskeletal:         General: No tenderness. Normal range of motion.      Cervical back: Normal range of motion and neck supple.      Right lower leg: Pitting Edema present.      Left lower leg: Pitting Edema present.   Lymphadenopathy:      Cervical: No cervical adenopathy.   Skin:     General: Skin is warm and dry.      Capillary Refill: Capillary refill takes less than 2 seconds.      Findings: Erythema present. No rash.   Neurological:      Mental Status: She is alert and oriented to person, place, and time.      Cranial Nerves: No cranial nerve deficit.      Sensory: No sensory deficit.      Coordination: Coordination normal.   Psychiatric:         Behavior: Behavior normal.         Thought Content: Thought content normal.         Judgment: Judgment normal.             Significant Labs: All pertinent labs within the past 24 hours have been reviewed.    Significant Imaging: I have reviewed all pertinent imaging results/findings within the past 24 hours.    Assessment/Plan:      * Acute on chronic diastolic CHF (congestive heart failure)  - last echo with EF 60-65%, GII LV DD  - BNP significantly elevated to 1165  - appears overloaded on exam with BLE edema and  increased weight gain over the past few days despite increasing her home lasix from daily to BID  - CXR consistent with pulmonary edema and small bilateral pleural effusions   - continue BB  - Monitor on telemetry.   - Patient is on CHF pathway.  Monitor strict Is&Os and daily weights.  Place on fluid restriction of 1.5 L.   - start IV diuresis with lasix 40 mg BID, increased to 80mg IV BID   - holding IV lasix for soft pressures, resume tomorrow  - goal UOP of -2L per day   - repeat echo noted below    Results for orders placed during the hospital encounter of 05/07/24    Echo    Interpretation Summary    Left Ventricle: The left ventricle is normal in size. Ventricular mass is normal. Normal wall thickness. Normal wall motion. There is normal systolic function. Ejection fraction by visual approximation is 55%. Unable to assess diastolic function due to atrial fibrillation.    Right Ventricle: Normal right ventricular cavity size. Wall thickness is normal. Systolic function is mildly reduced.    Aortic Valve: The aortic valve is a trileaflet valve. There is moderate aortic valve sclerosis. There is annular calcification present. Mildly restricted motion. No stenosis.    Tricuspid Valve: There is mild to moderate regurgitation.    Pulmonary Artery: The estimated pulmonary artery systolic pressure is 44 mmHg.    IVC/SVC: Elevated venous pressure at 15 mmHg.    Acute cystitis  - UA infectious on admission   - Ucx currently growing staph aureus and enterococcus  - pending susceptibilities of culture  - switched IV rocephin to IV vanc  - bladder scan with 500mL, straight cath performed  - monitor patient for continued urinary retention  - plan to discharge patient on bactrim and augmentin      Urinary retention  - patient with urinary retention on admission requiring straight cath   - q6hr bladder scans ordered   - flomax initiated   - mobilize patient as she has not been out of bed since admission   -  flomax initiated  "  - orosco placed due to continued urinary retention  - schedule FU with urology at discharge      Hypermagnesemia  - mag 3.5 on am labs  - giving IVF and monitoring for improvement  - repeat pending      Hypophosphatemia  Patient has Abnormal Phosphorus: hypophosphatemia. Will continue to monitor electrolytes closely. Will replace the affected electrolytes and repeat labs to be done after interventions completed. The patient's phosphorus results have been reviewed and are listed below.  No results for input(s): "PHOS" in the last 24 hours.       Cellulitis of right lower extremity  - afebrile without leukocytosis  - start IV rocephin 1g q24hr, switched to IV vanc  - blood cultures pending   - one culture with micrococcus, probable contaminant   - repeat bcx drawn - currently NGTD  - wound care consulted    Elevated troponin  - trop 0.032 on admit, baseline ~0.028  - patient denies chest pain  - likely elevated 2/2 CHF exacerbation  - repeat trop pending --> at baseline  - monitor tele     Acute renal failure superimposed on stage 3a chronic kidney disease  - Cr 1.8 on admit, baseline 1.3  - Estimated Creatinine Clearance: 24.8 mL/min (A) (based on SCr of 1.9 mg/dL (H)).  - Likely 2/2 hypervolemia in setting of CHF exacerbation. Should improve with IV diuresis  - renally dose meds  - avoid nephrotoxic insults  - monitor I/Os  - monitor with daily BMP, replete lytes if necessary  - improved to 1.2 with diuresis  - new Cr bump of 1.9 on am labs, most likely 2/2 diuresis, abx administration  - given x2 250cc IVF bolus since last night, repeat labs    Chronic respiratory failure with hypoxia  - chronic and stable on home 2L    Centrilobular emphysema  Patient's COPD is controlled currently.  Patient is currently off COPD Pathway. Continue scheduled inhalers Supplemental oxygen and monitor respiratory status closely.   - satting well on home 2L NC    Hyperlipidemia  - continue statin     Paroxysmal atrial " fibrillation  Patient with Paroxysmal (<7 days) atrial fibrillation which is controlled currently with Beta Blocker. Patient is currently in atrial fibrillation.[id number] Score: 3. Anticoagulation indicated. Anticoagulation done with eliquis 5 mg BID  .    Essential hypertension  - controlled on admit  - continue toprol 100 mg and losartan 50 mg daily  - IV diuresis as above  - patient with soft pressures, holding further diuretics until tomorrow am  - patient asymptomatic      VTE Risk Mitigation (From admission, onward)           Ordered     apixaban tablet 2.5 mg  2 times daily         05/08/24 0050     IP VTE HIGH RISK PATIENT  Once         05/07/24 2029     Reason for No Pharmacological VTE Prophylaxis  Once        Question:  Reasons:  Answer:  Already adequately anticoagulated on oral Anticoagulants    05/07/24 2029     Place sequential compression device  Until discontinued         05/07/24 2024                    Discharge Planning   NOHEMI: 5/12/2024     Code Status: Full Code   Is the patient medically ready for discharge?:     Reason for patient still in hospital (select all that apply): Patient trending condition, Laboratory test, and Treatment  Discharge Plan A: Home Health   Discharge Delays: None known at this time              Adina Wolff PA-C  Department of Hospital Medicine   [facility]

## 2024-05-12 NOTE — PLAN OF CARE
Problem: Comorbidity Management  Goal: Maintenance of COPD Symptom Control  Outcome: Progressing  Goal: Blood Pressure in Desired Range  Outcome: Progressing     Problem: Dysrhythmia  Goal: Normalized Cardiac Rhythm  Outcome: Progressing     Problem: Pulmonary Impairment  Goal: Improved Activity Tolerance  Outcome: Progressing  Goal: Effective Airway Clearance  Outcome: Progressing  Goal: Optimal Gas Exchange  Outcome: Progressing     Problem: Skin or Soft Tissue Infection  Goal: Absence of Infection Signs and Symptoms  Outcome: Progressing     Problem: UTI (Urinary Tract Infection)  Goal: Improved Infection Symptoms  Outcome: Progressing     Problem: Urinary Retention  Goal: Effective Urinary Elimination  Outcome: Progressing     Problem: Electrolyte Imbalance  Goal: Electrolyte Balance  Outcome: Progressing

## 2024-05-12 NOTE — ASSESSMENT & PLAN NOTE
Patient with Paroxysmal (<7 days) atrial fibrillation which is controlled currently with Beta Blocker. Patient is currently in atrial fibrillation.[id number] Score: 3. Anticoagulation indicated. Anticoagulation done with eliquis 5 mg BID  .

## 2024-05-13 ENCOUNTER — TELEPHONE (OUTPATIENT)
Dept: INTERNAL MEDICINE | Facility: CLINIC | Age: 79
End: 2024-05-13
Payer: MEDICARE

## 2024-05-13 LAB
ANION GAP SERPL CALC-SCNC: 7 MMOL/L (ref 8–16)
ANION GAP SERPL CALC-SCNC: 9 MMOL/L (ref 8–16)
BACTERIA BLD CULT: NORMAL
BUN SERPL-MCNC: 32 MG/DL (ref 8–23)
BUN SERPL-MCNC: 34 MG/DL (ref 8–23)
CALCIUM SERPL-MCNC: 9.8 MG/DL (ref 8.7–10.5)
CALCIUM SERPL-MCNC: 9.8 MG/DL (ref 8.7–10.5)
CHLORIDE SERPL-SCNC: 103 MMOL/L (ref 95–110)
CHLORIDE SERPL-SCNC: 104 MMOL/L (ref 95–110)
CO2 SERPL-SCNC: 27 MMOL/L (ref 23–29)
CO2 SERPL-SCNC: 27 MMOL/L (ref 23–29)
CREAT SERPL-MCNC: 1.8 MG/DL (ref 0.5–1.4)
CREAT SERPL-MCNC: 1.9 MG/DL (ref 0.5–1.4)
CREAT UR-MCNC: 114 MG/DL (ref 15–325)
EOSINOPHIL URNS QL WRIGHT STN: ABNORMAL
EST. GFR  (NO RACE VARIABLE): 26.4 ML/MIN/1.73 M^2
EST. GFR  (NO RACE VARIABLE): 28.1 ML/MIN/1.73 M^2
GLUCOSE SERPL-MCNC: 146 MG/DL (ref 70–110)
GLUCOSE SERPL-MCNC: 89 MG/DL (ref 70–110)
MAGNESIUM SERPL-MCNC: 2.1 MG/DL (ref 1.6–2.6)
OSMOLALITY SERPL: 304 MOSM/KG (ref 275–295)
OSMOLALITY UR: 433 MOSM/KG (ref 50–1200)
POTASSIUM SERPL-SCNC: 3.8 MMOL/L (ref 3.5–5.1)
POTASSIUM SERPL-SCNC: 4 MMOL/L (ref 3.5–5.1)
SODIUM SERPL-SCNC: 138 MMOL/L (ref 136–145)
SODIUM SERPL-SCNC: 139 MMOL/L (ref 136–145)
SODIUM UR-SCNC: 35 MMOL/L (ref 20–250)
UUN UR-MCNC: 597 MG/DL (ref 140–1050)

## 2024-05-13 PROCEDURE — 83735 ASSAY OF MAGNESIUM: CPT

## 2024-05-13 PROCEDURE — 80048 BASIC METABOLIC PNL TOTAL CA: CPT | Performed by: PHYSICIAN ASSISTANT

## 2024-05-13 PROCEDURE — 87205 SMEAR GRAM STAIN: CPT

## 2024-05-13 PROCEDURE — 25000003 PHARM REV CODE 250: Performed by: PHYSICIAN ASSISTANT

## 2024-05-13 PROCEDURE — 80048 BASIC METABOLIC PNL TOTAL CA: CPT | Mod: 91

## 2024-05-13 PROCEDURE — 84300 ASSAY OF URINE SODIUM: CPT

## 2024-05-13 PROCEDURE — 36415 COLL VENOUS BLD VENIPUNCTURE: CPT | Performed by: PHYSICIAN ASSISTANT

## 2024-05-13 PROCEDURE — 36415 COLL VENOUS BLD VENIPUNCTURE: CPT

## 2024-05-13 PROCEDURE — 25000003 PHARM REV CODE 250

## 2024-05-13 PROCEDURE — 63600175 PHARM REV CODE 636 W HCPCS

## 2024-05-13 PROCEDURE — 83930 ASSAY OF BLOOD OSMOLALITY: CPT

## 2024-05-13 PROCEDURE — 25000003 PHARM REV CODE 250: Performed by: HOSPITALIST

## 2024-05-13 PROCEDURE — 21400001 HC TELEMETRY ROOM

## 2024-05-13 PROCEDURE — 83935 ASSAY OF URINE OSMOLALITY: CPT

## 2024-05-13 PROCEDURE — 99900035 HC TECH TIME PER 15 MIN (STAT)

## 2024-05-13 PROCEDURE — 84540 ASSAY OF URINE/UREA-N: CPT

## 2024-05-13 PROCEDURE — 27000221 HC OXYGEN, UP TO 24 HOURS

## 2024-05-13 PROCEDURE — 94761 N-INVAS EAR/PLS OXIMETRY MLT: CPT

## 2024-05-13 PROCEDURE — 82570 ASSAY OF URINE CREATININE: CPT

## 2024-05-13 RX ORDER — LIDOCAINE HYDROCHLORIDE 20 MG/ML
15 SOLUTION OROPHARYNGEAL ONCE
Status: COMPLETED | OUTPATIENT
Start: 2024-05-13 | End: 2024-05-13

## 2024-05-13 RX ORDER — CEPHALEXIN 500 MG/1
500 CAPSULE ORAL 2 TIMES DAILY
Status: DISCONTINUED | OUTPATIENT
Start: 2024-05-13 | End: 2024-05-14

## 2024-05-13 RX ORDER — FUROSEMIDE 10 MG/ML
120 INJECTION INTRAMUSCULAR; INTRAVENOUS 2 TIMES DAILY
Status: DISCONTINUED | OUTPATIENT
Start: 2024-05-13 | End: 2024-05-13

## 2024-05-13 RX ORDER — CEPHALEXIN 500 MG/1
500 CAPSULE ORAL 2 TIMES DAILY
Status: DISCONTINUED | OUTPATIENT
Start: 2024-05-13 | End: 2024-05-13

## 2024-05-13 RX ORDER — ALUMINUM HYDROXIDE, MAGNESIUM HYDROXIDE, AND SIMETHICONE 1200; 120; 1200 MG/30ML; MG/30ML; MG/30ML
30 SUSPENSION ORAL ONCE
Status: COMPLETED | OUTPATIENT
Start: 2024-05-13 | End: 2024-05-13

## 2024-05-13 RX ORDER — FUROSEMIDE 10 MG/ML
120 INJECTION INTRAMUSCULAR; INTRAVENOUS 2 TIMES DAILY
Status: DISCONTINUED | OUTPATIENT
Start: 2024-05-13 | End: 2024-05-14

## 2024-05-13 RX ORDER — FUROSEMIDE 10 MG/ML
120 INJECTION INTRAMUSCULAR; INTRAVENOUS ONCE
Status: COMPLETED | OUTPATIENT
Start: 2024-05-13 | End: 2024-05-13

## 2024-05-13 RX ADMIN — MICONAZOLE NITRATE 2 % TOPICAL POWDER: at 08:05

## 2024-05-13 RX ADMIN — AMPICILLIN 1000 MG: 500 CAPSULE ORAL at 08:05

## 2024-05-13 RX ADMIN — TAMSULOSIN HYDROCHLORIDE 0.4 MG: 0.4 CAPSULE ORAL at 08:05

## 2024-05-13 RX ADMIN — ASPIRIN 81 MG: 81 TABLET, COATED ORAL at 08:05

## 2024-05-13 RX ADMIN — CEPHALEXIN 500 MG: 500 CAPSULE ORAL at 08:05

## 2024-05-13 RX ADMIN — APIXABAN 2.5 MG: 2.5 TABLET, FILM COATED ORAL at 08:05

## 2024-05-13 RX ADMIN — ATORVASTATIN CALCIUM 20 MG: 20 TABLET, FILM COATED ORAL at 08:05

## 2024-05-13 RX ADMIN — SULFAMETHOXAZOLE AND TRIMETHOPRIM 1 TABLET: 800; 160 TABLET ORAL at 08:05

## 2024-05-13 RX ADMIN — MICONAZOLE NITRATE 2 % TOPICAL POWDER: at 09:05

## 2024-05-13 RX ADMIN — SODIUM CHLORIDE, POTASSIUM CHLORIDE, SODIUM LACTATE AND CALCIUM CHLORIDE 250 ML: 600; 310; 30; 20 INJECTION, SOLUTION INTRAVENOUS at 08:05

## 2024-05-13 RX ADMIN — METOPROLOL SUCCINATE 100 MG: 100 TABLET, EXTENDED RELEASE ORAL at 08:05

## 2024-05-13 RX ADMIN — FUROSEMIDE 120 MG: 10 INJECTION, SOLUTION INTRAVENOUS at 02:05

## 2024-05-13 RX ADMIN — FUROSEMIDE 120 MG: 10 INJECTION, SOLUTION INTRAVENOUS at 08:05

## 2024-05-13 RX ADMIN — LIDOCAINE HYDROCHLORIDE 15 ML: 20 SOLUTION OROPHARYNGEAL at 03:05

## 2024-05-13 RX ADMIN — ALUMINUM HYDROXIDE, MAGNESIUM HYDROXIDE, AND SIMETHICONE 30 ML: 1200; 120; 1200 SUSPENSION ORAL at 03:05

## 2024-05-13 NOTE — ASSESSMENT & PLAN NOTE
- UA infectious on admission   - Ucx currently growing staph aureus and enterococcus  - pending susceptibilities of culture  - switched IV rocephin to IV vanc  - bladder scan with 500mL, straight cath performed  - monitor patient for continued urinary retention  - plan to discharge patient on bactrim and augmentin  - concerns that abx could be causing MARQUEZ, transition to ampicillin and keflex

## 2024-05-13 NOTE — TELEPHONE ENCOUNTER
----- Message from Chris Rinaldi sent at 5/13/2024 10:16 AM CDT -----  Contact: Nurse  .Type:  Sooner Apoointment Request    Caller is requesting a sooner appointment.  Caller declined first available appointment listed below.  Caller will not accept being placed on the waitlist and is requesting a message be sent to doctor.  Name of Caller:pt  When is the first available appointment?  Symptoms: hospital f/u  Would the patient rather a call back or a response via [facility]?  Call back  Best Call Back Number: [phone]   Additional Information: Pt. Needs Hospital f/u from 5 to 10 days.  Discharge today 05/13/2024

## 2024-05-13 NOTE — ASSESSMENT & PLAN NOTE
RESOLVED   - mag 3.5 on am labs  - giving IVF and monitoring for improvement  - repeat pending - 2.2

## 2024-05-13 NOTE — PROGRESS NOTES
[facility] [address] [place] [address] Medicine  Progress Note    Patient Name: Misa Barajas  MRN: [id number]  Patient Class: IP- Inpatient   Admission Date: 5/7/2024  Length of Stay: 5 days  Attending Physician: Fiorella Cobian MD  Primary Care Provider: Celia Carlisle MD        Subjective:     Principal Problem:Acute renal failure superimposed on stage 3a chronic kidney disease        HPI:  Misa Barajas is a 78 y.o. female with a PMHx of HTN, HLD, HFpEF, COPD on home O2 (2L NC baseline O2 sat 92%) and pAF presentswith increasing LE swelling.  She is gained 5 lb in the past 3 days despite increased Lasix use. She also endorses new redness and warmth with associated itching to her RLE. She has small pustules that she noticed to RLE a few days ago.  Denies fever, chills, chest pain, cough, SOB, abdominal pain, n/v/d, dysuria, headaches. Has chronic discoloration to BLEs without recent worsening.     ED: Afebrile . Hr 90s. Satting well on home 2L NC. CBC without leukocytosis or anemia. CMP notable for small MARQUEZ with Cr 1.8 (baseline 1.3) and BUN 26. BNP 1165. Trop 0.032 (0.028 at baseline). Lactic 1.2. CXR with Cardiomegaly with pulmonary vascular congestion and small bilateral pleural effusions, suggestive of pulmonary edema secondary to CHF. Given rocephin, toprol 100 mg and IV lasix 60 mg. Placed in observation for CHF exacerbation.     Overview/Hospital Course:  Misa Barajas is a 78 y.o. F who was admitted to  for further evaluation of acute on chronic diastolic CHF. Diuresing on IV lasix. Echo with EF 55%, unable to assess diastolic function due to a-fib, CVP 15mmHg. Cr 1.8 on admission most likely 2/2 to fluid overloaded, improved with diuresis. UA infectious, culture with staph aureus, enterococcus faecalis. IV rocephin switched to IV vancomycin. Transitioned to oral augmentin and bactrim. Patient with urinary retention requiring straight cath. Initiated flomax and bladder scan q6hr. Continued  retention - orosco cath placed prior to discharge. Will set up urology FU at discharge. Soft BP, improving with gentle IVF. Cr bump of 1.9, originally thought to be due to possibly over-diuresis or abx. Unimproved with IVF. Urine lytes, wrights stain, repeat UA and other labs pending. Bedside US found patient to be fluid overloaded - restart IV lasix and monitor for improvement.       Interval History: Patient seen and assessed at bedside. Afebrile, VSS on 2L NC (baseline). Cr 1.8 this morning. Trial of gentle IVF with repeat BMP - Cr 1.9. Repeat UA, urine lytes, wrights stain pending. Patient without complaints of SOB/CP. Bedside US performed showed patient to be fluid overloaded. Restart lasix and monitor for improvement.       Review of Systems   Constitutional:  Negative for activity change, chills and fever.   HENT:  Negative for trouble swallowing.    Eyes:  Negative for photophobia and visual disturbance.   Respiratory:  Negative for cough, chest tightness and shortness of breath (improved).    Cardiovascular:  Positive for leg swelling. Negative for chest pain and palpitations.   Gastrointestinal:  Negative for abdominal pain, constipation, diarrhea, nausea and vomiting.   Genitourinary:  Negative for dysuria, frequency and hematuria.   Musculoskeletal:  Negative for back pain, gait problem and neck pain.   Skin:  Positive for color change. Negative for rash and wound.   Neurological:  Negative for dizziness, syncope, speech difficulty and light-headedness.   Psychiatric/Behavioral:  Negative for agitation and confusion. The patient is not nervous/anxious.      Objective:     Vital Signs (Most Recent):  Temp: 97.7 °F (36.5 °C) (05/13/24 1119)  Pulse: 99 (05/13/24 1119)  Resp: 18 (05/13/24 1119)  BP: 90/61 (05/13/24 1119)  SpO2: (!) 93 % (05/13/24 1119) Vital Signs (24h Range):  Temp:  [96.3 °F (35.7 °C)-98 °F (36.7 °C)] 97.7 °F (36.5 °C)  Pulse:  [] 99  Resp:  [16-18] 18  SpO2:  [90 %-94 %] 93 %  BP:  ()/(56-61) 90/61     Weight: 83.2 kg (183 lb 6.8 oz)  Body mass index is 31.48 kg/m².    Intake/Output Summary (Last 24 hours) at 5/13/2024 1257  Last data filed at 5/13/2024 0520  Gross per 24 hour   Intake 240 ml   Output 650 ml   Net -410 ml         Physical Exam  Vitals and nursing note reviewed.   Constitutional:       General: She is not in acute distress.     Appearance: She is well-developed.   HENT:      Head: Normocephalic and atraumatic.      Mouth/Throat:      Pharynx: No oropharyngeal exudate.   Eyes:      General: No scleral icterus.     Conjunctiva/sclera: Conjunctivae normal.   Cardiovascular:      Rate and Rhythm: Normal rate and regular rhythm.      Heart sounds: Normal heart sounds.   Pulmonary:      Effort: Pulmonary effort is normal. No respiratory distress.      Breath sounds: Normal breath sounds. No wheezing or rales.   Abdominal:      General: Bowel sounds are normal. There is no distension.      Palpations: Abdomen is soft.      Tenderness: There is no abdominal tenderness.   Musculoskeletal:         General: No tenderness. Normal range of motion.      Cervical back: Normal range of motion and neck supple.      Right lower leg: Edema present.      Left lower leg: Edema present.   Lymphadenopathy:      Cervical: No cervical adenopathy.   Skin:     General: Skin is warm and dry.      Capillary Refill: Capillary refill takes less than 2 seconds.      Findings: Erythema present. No rash.   Neurological:      Mental Status: She is alert and oriented to person, place, and time.      Cranial Nerves: No cranial nerve deficit.      Sensory: No sensory deficit.      Coordination: Coordination normal.   Psychiatric:         Behavior: Behavior normal.         Thought Content: Thought content normal.         Judgment: Judgment normal.             Significant Labs: All pertinent labs within the past 24 hours have been reviewed.  CMP:   Recent Labs   Lab 05/12/24  1513 05/13/24  0526 05/13/24  1155     139 138   K 3.7 4.0 3.8    103 104   CO2 26 27 27   * 89 146*   BUN 31* 32* 34*   CREATININE 1.8* 1.8* 1.9*   CALCIUM 9.7 9.8 9.8   ANIONGAP 9 9 7*       Significant Imaging: I have reviewed all pertinent imaging results/findings within the past 24 hours.    Assessment/Plan:      * Acute on chronic diastolic CHF (congestive heart failure)  - last echo with EF 60-65%, GII LV DD  - BNP significantly elevated to 1165  - appears overloaded on exam with BLE edema and increased weight gain over the past few days despite increasing her home lasix from daily to BID  - CXR consistent with pulmonary edema and small bilateral pleural effusions   - continue BB  - Monitor on telemetry.   - Patient is on CHF pathway.  Monitor strict Is&Os and daily weights.  Place on fluid restriction of 1.5 L.   - start IV diuresis with lasix 40 mg BID, increased to 80mg IV BID  - goal UOP of -2L per day   - diuresis held originally for concerns of over diuresis with Cr bump  - bedside US performed by attending found patient to be fluid overloaded  - initiate 120mg IV lasix BID   - repeat echo noted below    Results for orders placed during the hospital encounter of 05/07/24    Echo    Interpretation Summary    Left Ventricle: The left ventricle is normal in size. Ventricular mass is normal. Normal wall thickness. Normal wall motion. There is normal systolic function. Ejection fraction by visual approximation is 55%. Unable to assess diastolic function due to atrial fibrillation.    Right Ventricle: Normal right ventricular cavity size. Wall thickness is normal. Systolic function is mildly reduced.    Aortic Valve: The aortic valve is a trileaflet valve. There is moderate aortic valve sclerosis. There is annular calcification present. Mildly restricted motion. No stenosis.    Tricuspid Valve: There is mild to moderate regurgitation.    Pulmonary Artery: The estimated pulmonary artery systolic pressure is 44 mmHg.     "IVC/SVC: Elevated venous pressure at 15 mmHg.    Acute cystitis  - UA infectious on admission   - Ucx currently growing staph aureus and enterococcus  - pending susceptibilities of culture  - switched IV rocephin to IV vanc  - bladder scan with 500mL, straight cath performed  - monitor patient for continued urinary retention  - plan to discharge patient on bactrim and augmentin  - concerns that abx could be causing MARQUEZ, transition to ampicillin and keflex    Urinary retention  - patient with urinary retention on admission requiring straight cath   - q6hr bladder scans ordered   - flomax initiated   - mobilize patient as she has not been out of bed since admission   -  flomax initiated   - orosco placed due to continued urinary retention  - schedule FU with urology at discharge      Hypermagnesemia  RESOLVED   - mag 3.5 on am labs  - giving IVF and monitoring for improvement  - repeat pending - 2.2      Hypophosphatemia  Patient has Abnormal Phosphorus: hypophosphatemia. Will continue to monitor electrolytes closely. Will replace the affected electrolytes and repeat labs to be done after interventions completed. The patient's phosphorus results have been reviewed and are listed below.  No results for input(s): "PHOS" in the last 24 hours.       Cellulitis of right lower extremity  - afebrile without leukocytosis  - start IV rocephin 1g q24hr, switched to IV vanc  - blood cultures pending   - one culture with micrococcus, probable contaminant   - repeat bcx drawn - currently NGTD  - wound care consulted    Elevated troponin  - trop 0.032 on admit, baseline ~0.028  - patient denies chest pain  - likely elevated 2/2 CHF exacerbation  - repeat trop pending --> at baseline  - monitor tele     Acute renal failure superimposed on stage 3a chronic kidney disease  - Cr 1.8 on admit, baseline 1.3  - Estimated Creatinine Clearance: 24.6 mL/min (A) (based on SCr of 1.9 mg/dL (H)).  - Likely 2/2 hypervolemia in setting of CHF " exacerbation. Should improve with IV diuresis  - renally dose meds  - avoid nephrotoxic insults  - monitor I/Os  - monitor with daily BMP, replete lytes if necessary  - improved to 1.2 with diuresis  - new Cr bump of 1.9 on am labs  - Cr without improving s/p fluids, due to fluid overload  - urine lytes pending, repeat UA pending, urine urea pending, wrights stain pending    Chronic respiratory failure with hypoxia  - chronic and stable on home 2L    Centrilobular emphysema  Patient's COPD is controlled currently.  Patient is currently off COPD Pathway. Continue scheduled inhalers Supplemental oxygen and monitor respiratory status closely.   - satting well on home 2L NC    Hyperlipidemia  - continue statin     Paroxysmal atrial fibrillation  Patient with Paroxysmal (<7 days) atrial fibrillation which is controlled currently with Beta Blocker. Patient is currently in atrial fibrillation.[id number] Score: 3. Anticoagulation indicated. Anticoagulation done with eliquis 5 mg BID  .    Essential hypertension  - controlled on admit  - continue toprol 100 mg and losartan 50 mg daily  - IV diuresis as above  - patient with soft pressures, holding further diuretics for now  - patient asymptomatic      VTE Risk Mitigation (From admission, onward)           Ordered     apixaban tablet 2.5 mg  2 times daily         05/08/24 0050     IP VTE HIGH RISK PATIENT  Once         05/07/24 2029     Reason for No Pharmacological VTE Prophylaxis  Once        Question:  Reasons:  Answer:  Already adequately anticoagulated on oral Anticoagulants    05/07/24 2029     Place sequential compression device  Until discontinued         05/07/24 2024                    Discharge Planning   NOHEMI: 5/14/2024     Code Status: Full Code   Is the patient medically ready for discharge?:     Reason for patient still in hospital (select all that apply): Patient new problem, Patient trending condition, and Treatment  Discharge Plan A: Home Health   Discharge  Delays: None known at this time              Adina Wolff PA-C  Department of Hospital Medicine   [facility]

## 2024-05-13 NOTE — ASSESSMENT & PLAN NOTE
- controlled on admit  - continue toprol 100 mg and losartan 50 mg daily  - IV diuresis as above  - patient with soft pressures, holding further diuretics for now  - patient asymptomatic

## 2024-05-13 NOTE — ASSESSMENT & PLAN NOTE
- Cr 1.8 on admit, baseline 1.3  - Estimated Creatinine Clearance: 24.6 mL/min (A) (based on SCr of 1.9 mg/dL (H)).  - Likely 2/2 hypervolemia in setting of CHF exacerbation. Should improve with IV diuresis  - renally dose meds  - avoid nephrotoxic insults  - monitor I/Os  - monitor with daily BMP, replete lytes if necessary  - improved to 1.2 with diuresis  - new Cr bump of 1.9 on am labs, most likely 2/2 diuresis or abx administration  - Cr without improving s/p fluids  - urine lytes pending, repeat UA pending, urine urea pending, wrights stain pending

## 2024-05-13 NOTE — ASSESSMENT & PLAN NOTE
Patient with Paroxysmal (<7 days) atrial fibrillation which is controlled currently with Beta Blocker. Patient is currently in atrial fibrillation.[id number] Score: 3. Anticoagulation indicated. Anticoagulation done with eliquis 5 mg BID  .

## 2024-05-13 NOTE — SUBJECTIVE & OBJECTIVE
Interval History: Patient seen and assessed at bedside. Afebrile, VSS on 2L NC (baseline). Cr 1.8 this morning. Trial of gentle IVF with repeat BMP - Cr 1.9. Repeat UA, urine lytes, wrights stain pending. Patient without complaints of SOB/CP. Holding diuresis for now.       Review of Systems   Constitutional:  Negative for activity change, chills and fever.   HENT:  Negative for trouble swallowing.    Eyes:  Negative for photophobia and visual disturbance.   Respiratory:  Negative for cough, chest tightness and shortness of breath (improved).    Cardiovascular:  Positive for leg swelling. Negative for chest pain and palpitations.   Gastrointestinal:  Negative for abdominal pain, constipation, diarrhea, nausea and vomiting.   Genitourinary:  Negative for dysuria, frequency and hematuria.   Musculoskeletal:  Negative for back pain, gait problem and neck pain.   Skin:  Positive for color change. Negative for rash and wound.   Neurological:  Negative for dizziness, syncope, speech difficulty and light-headedness.   Psychiatric/Behavioral:  Negative for agitation and confusion. The patient is not nervous/anxious.      Objective:     Vital Signs (Most Recent):  Temp: 97.7 °F (36.5 °C) (05/13/24 1119)  Pulse: 99 (05/13/24 1119)  Resp: 18 (05/13/24 1119)  BP: 90/61 (05/13/24 1119)  SpO2: (!) 93 % (05/13/24 1119) Vital Signs (24h Range):  Temp:  [96.3 °F (35.7 °C)-98 °F (36.7 °C)] 97.7 °F (36.5 °C)  Pulse:  [] 99  Resp:  [16-18] 18  SpO2:  [90 %-94 %] 93 %  BP: ()/(56-61) 90/61     Weight: 83.2 kg (183 lb 6.8 oz)  Body mass index is 31.48 kg/m².    Intake/Output Summary (Last 24 hours) at 5/13/2024 1257  Last data filed at 5/13/2024 0520  Gross per 24 hour   Intake 240 ml   Output 650 ml   Net -410 ml         Physical Exam  Vitals and nursing note reviewed.   Constitutional:       General: She is not in acute distress.     Appearance: She is well-developed.   HENT:      Head: Normocephalic and atraumatic.       Mouth/Throat:      Pharynx: No oropharyngeal exudate.   Eyes:      General: No scleral icterus.     Conjunctiva/sclera: Conjunctivae normal.   Cardiovascular:      Rate and Rhythm: Normal rate and regular rhythm.      Heart sounds: Normal heart sounds.   Pulmonary:      Effort: Pulmonary effort is normal. No respiratory distress.      Breath sounds: Normal breath sounds. No wheezing or rales.   Abdominal:      General: Bowel sounds are normal. There is no distension.      Palpations: Abdomen is soft.      Tenderness: There is no abdominal tenderness.   Musculoskeletal:         General: No tenderness. Normal range of motion.      Cervical back: Normal range of motion and neck supple.      Right lower leg: Edema present.      Left lower leg: Edema present.   Lymphadenopathy:      Cervical: No cervical adenopathy.   Skin:     General: Skin is warm and dry.      Capillary Refill: Capillary refill takes less than 2 seconds.      Findings: Erythema present. No rash.   Neurological:      Mental Status: She is alert and oriented to person, place, and time.      Cranial Nerves: No cranial nerve deficit.      Sensory: No sensory deficit.      Coordination: Coordination normal.   Psychiatric:         Behavior: Behavior normal.         Thought Content: Thought content normal.         Judgment: Judgment normal.             Significant Labs: All pertinent labs within the past 24 hours have been reviewed.  CMP:   Recent Labs   Lab 05/12/24  1513 05/13/24  0526 05/13/24  1155    139 138   K 3.7 4.0 3.8    103 104   CO2 26 27 27   * 89 146*   BUN 31* 32* 34*   CREATININE 1.8* 1.8* 1.9*   CALCIUM 9.7 9.8 9.8   ANIONGAP 9 9 7*       Significant Imaging: I have reviewed all pertinent imaging results/findings within the past 24 hours.

## 2024-05-13 NOTE — ASSESSMENT & PLAN NOTE
- last echo with EF 60-65%, GII LV DD  - BNP significantly elevated to 1165  - appears overloaded on exam with BLE edema and increased weight gain over the past few days despite increasing her home lasix from daily to BID  - CXR consistent with pulmonary edema and small bilateral pleural effusions   - continue BB  - Monitor on telemetry.   - Patient is on CHF pathway.  Monitor strict Is&Os and daily weights.  Place on fluid restriction of 1.5 L.   - start IV diuresis with lasix 40 mg BID, increased to 80mg IV BID  - goal UOP of -2L per day   - diuresis held originally for concerns of over diuresis with Cr bump  - POCUS performed by attending found patient to be fluid overloaded  - initiate 120mg IV lasix BID - holding for now  - repeat echo noted below    Results for orders placed during the hospital encounter of 05/07/24    Echo    Interpretation Summary    Left Ventricle: The left ventricle is normal in size. Ventricular mass is normal. Normal wall thickness. Normal wall motion. There is normal systolic function. Ejection fraction by visual approximation is 55%. Unable to assess diastolic function due to atrial fibrillation.    Right Ventricle: Normal right ventricular cavity size. Wall thickness is normal. Systolic function is mildly reduced.    Aortic Valve: The aortic valve is a trileaflet valve. There is moderate aortic valve sclerosis. There is annular calcification present. Mildly restricted motion. No stenosis.    Tricuspid Valve: There is mild to moderate regurgitation.    Pulmonary Artery: The estimated pulmonary artery systolic pressure is 44 mmHg.    IVC/SVC: Elevated venous pressure at 15 mmHg.

## 2024-05-13 NOTE — ASSESSMENT & PLAN NOTE
- last echo with EF 60-65%, GII LV DD  - BNP significantly elevated to 1165  - appears overloaded on exam with BLE edema and increased weight gain over the past few days despite increasing her home lasix from daily to BID  - CXR consistent with pulmonary edema and small bilateral pleural effusions   - continue BB  - Monitor on telemetry.   - Patient is on CHF pathway.  Monitor strict Is&Os and daily weights.  Place on fluid restriction of 1.5 L.   - start IV diuresis with lasix 40 mg BID, increased to 80mg IV BID   - holding diuresis for now  - goal UOP of -2L per day   - repeat echo noted below    Results for orders placed during the hospital encounter of 05/07/24    Echo    Interpretation Summary    Left Ventricle: The left ventricle is normal in size. Ventricular mass is normal. Normal wall thickness. Normal wall motion. There is normal systolic function. Ejection fraction by visual approximation is 55%. Unable to assess diastolic function due to atrial fibrillation.    Right Ventricle: Normal right ventricular cavity size. Wall thickness is normal. Systolic function is mildly reduced.    Aortic Valve: The aortic valve is a trileaflet valve. There is moderate aortic valve sclerosis. There is annular calcification present. Mildly restricted motion. No stenosis.    Tricuspid Valve: There is mild to moderate regurgitation.    Pulmonary Artery: The estimated pulmonary artery systolic pressure is 44 mmHg.    IVC/SVC: Elevated venous pressure at 15 mmHg.

## 2024-05-13 NOTE — ASSESSMENT & PLAN NOTE
- Cr 1.8 on admit, baseline 1.3  - Estimated Creatinine Clearance: 24.6 mL/min (A) (based on SCr of 1.9 mg/dL (H)).  - Likely 2/2 hypervolemia in setting of CHF exacerbation. Should improve with IV diuresis  - renally dose meds  - avoid nephrotoxic insults  - monitor I/Os  - monitor with daily BMP, replete lytes if necessary  - improved to 1.2 with diuresis  - new Cr bump of 1.9 on am labs, not improving s/p fluids, holding diuresis for now  - given lasix 120mg x2 yesterday  - urine lytes pending, repeat UA pending, urine urea pending, wrights stain pending   -wrights stain with eosinophils   -discontinued all abx  - nephrology consulted for rise in Cr and possible AIN, appreciate recommendations

## 2024-05-13 NOTE — PLAN OF CARE
MARINE received notification that patient/family filed an appeal for patient's discharge over the weekend, [facility] Appeal# [id number]. SW was also notified by the medical team that patient's discharge was canceled. MARINE was informed by leadership to fax canceled discharge orders to [facility] to cancel out the appeal progress. Prior [facility], MARINE received a call from Swathi with [facility] by [employer] to confirm if MARINE was aware that an appeal has been filed. MARINE informed Swathi that she was aware of the appeal and explained that the discharge was canceled due to patient no longer being medically ready at the time the appeal was filed. Swathi reported that documentation requested for the appeal including the canceled discharge will still have to be uploaded to [employer] for review prior to the appeal being canceled. MARINE was also informed that documentation is needed prior to 12:00PM on 5/13.    MARINE uploaded documentation to [employer] at [email or web address] for review.    MARINE will continue to follow up.    UPDATE    MARINE received a message from [employer] reporting that they have reviewed the uploaded documentation and at this time the appeal will be closed out due to the discharge being canceled. MARINE was asked to call to confirm if this information was correct. MARINE contacted [facility] and informed them that pt's discharge was canceled. MARINE was informed that they will move forward with closing out the appeal.      Francy Lu LMSW  [facility]  Ext. [phone]

## 2024-05-13 NOTE — PLAN OF CARE
05/13/24 1604   Discharge Reassessment   Assessment Type Discharge Planning Reassessment   Did the patient's condition or plan change since previous assessment? No   Discharge Plan A Home Health   Discharge Plan B Home Health   Post-Acute Status   Post-Acute Authorization Home Health   Home Health Status Referrals Sent     Overview/Hospital Course 5/13: Misa Barajas is a 78 y.o. F who was admitted to  for further evaluation of acute on chronic diastolic CHF. Diuresing on IV lasix. Echo with EF 55%, unable to assess diastolic function due to a-fib, CVP 15mmHg. Cr 1.8 on admission most likely 2/2 to fluid overloaded, improved with diuresis. UA infectious, culture with staph aureus, enterococcus faecalis. IV rocephin switched to IV vancomycin. Transitioned to oral augmentin and bactrim. Patient with urinary retention requiring straight cath. Initiated flomax and bladder scan q6hr. Continued retention - orosco cath placed prior to discharge. Will set up urology FU at discharge. Soft BP, improving with gentle IVF. Cr bump of 1.9, originally thought to be due to possibly over-diuresis or abx. Unimproved with IVF. Urine lytes, wrights stain, repeat UA and other labs pending. Bedside US found patient to be fluid overloaded - restart IV lasix and monitor for improvement.     Discharge Plan A and Plan B have been determined by review of patient's clinical status, future medical and therapeutic needs, and coverage/benefits for post-acute care in coordination with multidisciplinary team members.    SW will continue to follow up for discharge planning needs.      Francy Lu LMSW  [facility]  Ext. [phone]

## 2024-05-14 PROBLEM — N18.32 ACUTE RENAL FAILURE SUPERIMPOSED ON STAGE 3B CHRONIC KIDNEY DISEASE: Status: ACTIVE | Noted: 2022-05-02

## 2024-05-14 PROBLEM — S81.801A WOUND OF RIGHT LOWER EXTREMITY: Status: ACTIVE | Noted: 2024-05-08

## 2024-05-14 LAB
ANION GAP SERPL CALC-SCNC: 10 MMOL/L (ref 8–16)
BACTERIA BLD CULT: NORMAL
BACTERIA BLD CULT: NORMAL
BUN SERPL-MCNC: 35 MG/DL (ref 8–23)
CALCIUM SERPL-MCNC: 9.4 MG/DL (ref 8.7–10.5)
CHLORIDE SERPL-SCNC: 102 MMOL/L (ref 95–110)
CO2 SERPL-SCNC: 25 MMOL/L (ref 23–29)
CREAT SERPL-MCNC: 2.1 MG/DL (ref 0.5–1.4)
EST. GFR  (NO RACE VARIABLE): 23.4 ML/MIN/1.73 M^2
GLUCOSE SERPL-MCNC: 91 MG/DL (ref 70–110)
MAGNESIUM SERPL-MCNC: 2.1 MG/DL (ref 1.6–2.6)
PHOSPHATE SERPL-MCNC: 3.3 MG/DL (ref 2.7–4.5)
POTASSIUM SERPL-SCNC: 3.7 MMOL/L (ref 3.5–5.1)
SODIUM SERPL-SCNC: 137 MMOL/L (ref 136–145)

## 2024-05-14 PROCEDURE — 84100 ASSAY OF PHOSPHORUS: CPT

## 2024-05-14 PROCEDURE — 97530 THERAPEUTIC ACTIVITIES: CPT | Mod: CQ

## 2024-05-14 PROCEDURE — 94640 AIRWAY INHALATION TREATMENT: CPT

## 2024-05-14 PROCEDURE — 27000221 HC OXYGEN, UP TO 24 HOURS

## 2024-05-14 PROCEDURE — 25000003 PHARM REV CODE 250: Performed by: PHYSICIAN ASSISTANT

## 2024-05-14 PROCEDURE — 94761 N-INVAS EAR/PLS OXIMETRY MLT: CPT

## 2024-05-14 PROCEDURE — 99222 1ST HOSP IP/OBS MODERATE 55: CPT | Mod: ,,, | Performed by: INTERNAL MEDICINE

## 2024-05-14 PROCEDURE — 97116 GAIT TRAINING THERAPY: CPT | Mod: CQ

## 2024-05-14 PROCEDURE — 21400001 HC TELEMETRY ROOM

## 2024-05-14 PROCEDURE — 99900035 HC TECH TIME PER 15 MIN (STAT)

## 2024-05-14 PROCEDURE — 80048 BASIC METABOLIC PNL TOTAL CA: CPT | Performed by: PHYSICIAN ASSISTANT

## 2024-05-14 PROCEDURE — 83735 ASSAY OF MAGNESIUM: CPT

## 2024-05-14 PROCEDURE — 25000003 PHARM REV CODE 250

## 2024-05-14 PROCEDURE — 36415 COLL VENOUS BLD VENIPUNCTURE: CPT | Performed by: PHYSICIAN ASSISTANT

## 2024-05-14 PROCEDURE — 36415 COLL VENOUS BLD VENIPUNCTURE: CPT

## 2024-05-14 RX ADMIN — ATORVASTATIN CALCIUM 20 MG: 20 TABLET, FILM COATED ORAL at 09:05

## 2024-05-14 RX ADMIN — APIXABAN 2.5 MG: 2.5 TABLET, FILM COATED ORAL at 09:05

## 2024-05-14 RX ADMIN — FLUTICASONE FUROATE AND VILANTEROL TRIFENATATE 1 PUFF: 100; 25 POWDER RESPIRATORY (INHALATION) at 01:05

## 2024-05-14 RX ADMIN — METOPROLOL SUCCINATE 100 MG: 100 TABLET, EXTENDED RELEASE ORAL at 09:05

## 2024-05-14 RX ADMIN — ASPIRIN 81 MG: 81 TABLET, COATED ORAL at 09:05

## 2024-05-14 RX ADMIN — MICONAZOLE NITRATE 2 % TOPICAL POWDER: at 09:05

## 2024-05-14 RX ADMIN — APIXABAN 2.5 MG: 2.5 TABLET, FILM COATED ORAL at 08:05

## 2024-05-14 RX ADMIN — TAMSULOSIN HYDROCHLORIDE 0.4 MG: 0.4 CAPSULE ORAL at 09:05

## 2024-05-14 NOTE — ASSESSMENT & PLAN NOTE
- afebrile without leukocytosis  - blood cultures pending   - one culture with micrococcus, probable contaminant   - repeat bcx drawn - currently NGTD  - wound care consulted

## 2024-05-14 NOTE — HPI
Ms Barajas is an obese (BMI ~31) 78-year-old woman with HFpEF (most recent TTE with EF 55% with G2DD), mild to moderate tricuspid regurgitation, hypertension, HLD, paroxsymal atrial fibrillation on Eliquis, COPD on supplemental oxygen at home with 2 liters via nasal cannula, atherosclerotic disease, chronic lymphedema, history of left sided breast cancer, CKD IIIb (baseline creatinine ~1.5) as well as several other co-morbid conditions who was admitted to [facility] on 5/7 with acute on chronic CHF exacerbation, MARQUEZ with creatinine 1.8, UTI growing S. aureus & Enterococcus faecalis and concern for right lower extremity cellulitis. She was management in the usually fashion with IV diuresis with Lasix in addition to receiving several different antibiotics including ampicillin, Augmentin, Rocephin, cephalexin, Bactrim and vancomycin. Her admission has been complicated by urinary retention necessitating Muniz catheter placement, hypotension (lowest document BP 87/54 mmHg) and worsening renal function for which Nephrology has been consulted. Creatinine trend this admission is as follows 1.8 > 1.5 > 1.2 > 1.3 > 1.9 > 1.8 > 1.9 > 2.1. Mcgarry stain was noted to be positive for occasional eosinophils.  Urinary sediment was assessed which demonstrated innumerable WBCs with some within waxy casts, non dysmorphic RBCs, waxy casts and occasional bacteria.

## 2024-05-14 NOTE — PROGRESS NOTES
[facility] [address] [place] [address] Medicine  Progress Note    Patient Name: Misa Barajas  MRN: [id number]  Patient Class: IP- Inpatient   Admission Date: 5/7/2024  Length of Stay: 6 days  Attending Physician: Fiorella Cobian MD  Primary Care Provider: Celia Carlisle MD        Subjective:     Principal Problem:Acute on chronic diastolic CHF (congestive heart failure)        HPI:  Misa Barajas is a 78 y.o. female with a PMHx of HTN, HLD, HFpEF, COPD on home O2 (2L NC baseline O2 sat 92%) and pAF presentswith increasing LE swelling.  She is gained 5 lb in the past 3 days despite increased Lasix use. She also endorses new redness and warmth with associated itching to her RLE. She has small pustules that she noticed to RLE a few days ago.  Denies fever, chills, chest pain, cough, SOB, abdominal pain, n/v/d, dysuria, headaches. Has chronic discoloration to BLEs without recent worsening.     ED: Afebrile . Hr 90s. Satting well on home 2L NC. CBC without leukocytosis or anemia. CMP notable for small MARQUEZ with Cr 1.8 (baseline 1.3) and BUN 26. BNP 1165. Trop 0.032 (0.028 at baseline). Lactic 1.2. CXR with Cardiomegaly with pulmonary vascular congestion and small bilateral pleural effusions, suggestive of pulmonary edema secondary to CHF. Given rocephin, toprol 100 mg and IV lasix 60 mg. Placed in observation for CHF exacerbation.     Overview/Hospital Course:  Misa Barajas is a 78 y.o. F who was admitted to  for further evaluation of acute on chronic diastolic CHF. Diuresing on IV lasix. Echo with EF 55%, unable to assess diastolic function due to a-fib, CVP 15mmHg. Cr 1.8 on admission most likely 2/2 to fluid overloaded, improved with diuresis. UA infectious, culture with staph aureus, enterococcus faecalis. IV rocephin switched to IV vancomycin. Transitioned to oral augmentin and bactrim. Patient with urinary retention requiring straight cath. Initiated flomax and bladder scan q6hr. Continued retention -  Patient came in today for ppd read. Interpretation was 6 mm. Notified MD on call. She advise to have x-ray completed. Patient informed to have x-ray   orosco cath placed prior to discharge. Will set up urology FU at discharge. Soft BP, improving with gentle IVF. Cr bump of 1.9, originally thought to be due to possibly over-diuresis or abx. Unimproved with IVF. Mcgarry's stain with eosinophils, concerning for possible AIN - discontinued all abx. Nephrology consult placed for further recommendations.     Interval History: Patient seen and assessed at bedside. Afebrile, soft BP, otherwise VSS on home 2L NC. Patient reports no complaints besides mild REED. Cr. Bump 2.1 despite additional diuresis. Wrights stain with eosinophils - possible AIN. Discontinued all abx at this time. Nephrology consulted.        Review of Systems   Constitutional:  Negative for activity change, chills and fever.   HENT:  Negative for trouble swallowing.    Eyes:  Negative for photophobia and visual disturbance.   Respiratory:  Negative for cough, chest tightness and shortness of breath (improved).         +REED   Cardiovascular:  Positive for leg swelling. Negative for chest pain and palpitations.   Gastrointestinal:  Negative for abdominal pain, constipation, diarrhea, nausea and vomiting.   Genitourinary:  Negative for dysuria, frequency and hematuria.   Musculoskeletal:  Negative for back pain, gait problem and neck pain.   Skin:  Positive for color change. Negative for rash and wound.   Neurological:  Negative for dizziness, syncope, speech difficulty and light-headedness.   Psychiatric/Behavioral:  Negative for agitation and confusion. The patient is not nervous/anxious.      Objective:     Vital Signs (Most Recent):  Temp: 97.8 °F (36.6 °C) (05/14/24 0811)  Pulse: 88 (05/14/24 0811)  Resp: 16 (05/14/24 0811)  BP: (!) 100/58 (05/14/24 0811)  SpO2: 96 % (05/14/24 0811) Vital Signs (24h Range):  Temp:  [97.6 °F (36.4 °C)-97.9 °F (36.6 °C)] 97.8 °F (36.6 °C)  Pulse:  [] 88  Resp:  [16-18] 16  SpO2:  [92 %-96 %] 96 %  BP: ()/(54-65) 100/58     Weight: 82.6 kg (182 lb 1.6 oz)  Body  mass index is 31.26 kg/m².    Intake/Output Summary (Last 24 hours) at 5/14/2024 0942  Last data filed at 5/14/2024 0614  Gross per 24 hour   Intake --   Output 1950 ml   Net -1950 ml         Physical Exam  Vitals and nursing note reviewed.   Constitutional:       General: She is not in acute distress.     Appearance: She is well-developed.   HENT:      Head: Normocephalic and atraumatic.      Mouth/Throat:      Pharynx: No oropharyngeal exudate.   Eyes:      General: No scleral icterus.     Conjunctiva/sclera: Conjunctivae normal.   Cardiovascular:      Rate and Rhythm: Normal rate and regular rhythm.      Heart sounds: Normal heart sounds.   Pulmonary:      Effort: Pulmonary effort is normal. No respiratory distress.      Breath sounds: Normal breath sounds. No wheezing or rales.      Comments: NC in place  Abdominal:      General: Bowel sounds are normal. There is no distension.      Palpations: Abdomen is soft.      Tenderness: There is no abdominal tenderness.   Genitourinary:     Comments: Muniz in place  Musculoskeletal:         General: No tenderness. Normal range of motion.      Cervical back: Normal range of motion and neck supple.      Right lower leg: Edema present.      Left lower leg: Edema present.   Lymphadenopathy:      Cervical: No cervical adenopathy.   Skin:     General: Skin is warm and dry.      Capillary Refill: Capillary refill takes less than 2 seconds.      Findings: Erythema present. No rash.   Neurological:      Mental Status: She is alert and oriented to person, place, and time.      Cranial Nerves: No cranial nerve deficit.      Sensory: No sensory deficit.      Coordination: Coordination normal.   Psychiatric:         Behavior: Behavior normal.         Thought Content: Thought content normal.         Judgment: Judgment normal.             Significant Labs: All pertinent labs within the past 24 hours have been reviewed.  CMP:   Recent Labs   Lab 05/13/24  0526 05/13/24  1153  05/14/24  0653    138 137   K 4.0 3.8 3.7    104 102   CO2 27 27 25   GLU 89 146* 91   BUN 32* 34* 35*   CREATININE 1.8* 1.9* 2.1*   CALCIUM 9.8 9.8 9.4   ANIONGAP 9 7* 10       Significant Imaging: I have reviewed all pertinent imaging results/findings within the past 24 hours.    Assessment/Plan:      * Acute renal failure superimposed on stage 3a chronic kidney disease  - Cr 1.8 on admit, baseline 1.3  - Estimated Creatinine Clearance: 22.2 mL/min (A) (based on SCr of 2.1 mg/dL (H)).  - Likely 2/2 hypervolemia in setting of CHF exacerbation. Should improve with IV diuresis  - renally dose meds  - avoid nephrotoxic insults  - monitor I/Os  - monitor with daily BMP, replete lytes if necessary  - improved to 1.2 with diuresis  - new Cr bump of 1.9 on am labs, not improving s/p fluids, holding diuresis for now  - given lasix 120mg x2 yesterday  - urine lytes pending, repeat UA pending, urine urea pending, wrights stain pending    - FeNa calculated at 0.5%   -wrights stain with eosinophils   -discontinued all abx  - nephrology consulted for rise in Cr and possible AIN, appreciate recommendations    Acute cystitis  - UA infectious on admission   - Ucx currently growing staph aureus and enterococcus  - pending susceptibilities of culture  - switched IV rocephin to IV vanc  - bladder scan with 500mL, straight cath performed  - monitor patient for continued urinary retention  - switch to oral bactrim and augmentin  - concerns that abx could be causing MARQUEZ, transition to ampicillin and keflex  - 7d completed of abx, discontinued all due to AIN concerns    Urinary retention  - patient with urinary retention on admission requiring straight cath   - q6hr bladder scans ordered   - flomax initiated   - mobilize patient as she has not been out of bed since admission   -  flomax initiated   - orosco placed due to continued urinary retention  - schedule FU with urology at discharge  - plan for voiding trial prior to  discharge      Hypermagnesemia  RESOLVED   - mag 3.5 on am labs  - giving IVF and monitoring for improvement  - repeat pending - 2.2      Hypophosphatemia  RESOLVED  Patient has Abnormal Phosphorus: hypophosphatemia. Will continue to monitor electrolytes closely. Will replace the affected electrolytes and repeat labs to be done after interventions completed. The patient's phosphorus results have been reviewed and are listed below.  Recent Labs   Lab 05/14/24  0746   PHOS 3.3          Wound of right lower extremity  - afebrile without leukocytosis  - blood cultures pending   - one culture with micrococcus, probable contaminant   - repeat bcx drawn - currently NGTD  - wound care consulted    Elevated troponin  - trop 0.032 on admit, baseline ~0.028  - patient denies chest pain  - likely elevated 2/2 CHF exacerbation  - repeat trop pending --> at baseline  - monitor tele     Acute on chronic diastolic CHF (congestive heart failure)  - last echo with EF 60-65%, GII LV DD  - BNP significantly elevated to 1165  - appears overloaded on exam with BLE edema and increased weight gain over the past few days despite increasing her home lasix from daily to BID  - CXR consistent with pulmonary edema and small bilateral pleural effusions   - continue BB  - Monitor on telemetry.   - Patient is on CHF pathway.  Monitor strict Is&Os and daily weights.  Place on fluid restriction of 1.5 L.   - start IV diuresis with lasix 40 mg BID, increased to 80mg IV BID  - goal UOP of -2L per day   - diuresis held originally for concerns of over diuresis with Cr bump  - POCUS performed by attending found patient to be fluid overloaded  - initiate 120mg IV lasix BID - holding for now  - repeat echo noted below    Results for orders placed during the hospital encounter of 05/07/24    Echo    Interpretation Summary    Left Ventricle: The left ventricle is normal in size. Ventricular mass is normal. Normal wall thickness. Normal wall motion. There is  normal systolic function. Ejection fraction by visual approximation is 55%. Unable to assess diastolic function due to atrial fibrillation.    Right Ventricle: Normal right ventricular cavity size. Wall thickness is normal. Systolic function is mildly reduced.    Aortic Valve: The aortic valve is a trileaflet valve. There is moderate aortic valve sclerosis. There is annular calcification present. Mildly restricted motion. No stenosis.    Tricuspid Valve: There is mild to moderate regurgitation.    Pulmonary Artery: The estimated pulmonary artery systolic pressure is 44 mmHg.    IVC/SVC: Elevated venous pressure at 15 mmHg.    Chronic respiratory failure with hypoxia  - chronic and stable on home 2L    Centrilobular emphysema  Patient's COPD is controlled currently.  Patient is currently off COPD Pathway. Continue scheduled inhalers Supplemental oxygen and monitor respiratory status closely.   - satting well on home 2L NC    Hyperlipidemia  - continue statin     Paroxysmal atrial fibrillation  Patient with Paroxysmal (<7 days) atrial fibrillation which is controlled currently with Beta Blocker. Patient is currently in atrial fibrillation.[id number] Score: 3. Anticoagulation indicated. Anticoagulation done with eliquis 5 mg BID  .    Essential hypertension  - controlled on admit  - continue toprol 100 mg and losartan 50 mg daily   - holding home losartan  - IV diuresis as above  - patient with soft pressures  - patient asymptomatic, monitor closely      VTE Risk Mitigation (From admission, onward)           Ordered     apixaban tablet 2.5 mg  2 times daily         05/08/24 0050     IP VTE HIGH RISK PATIENT  Once         05/07/24 2029     Reason for No Pharmacological VTE Prophylaxis  Once        Question:  Reasons:  Answer:  Already adequately anticoagulated on oral Anticoagulants    05/07/24 2029     Place sequential compression device  Until discontinued         05/07/24 2024                    Discharge Planning    NOHEMI: 5/15/2024     Code Status: Full Code   Is the patient medically ready for discharge?:     Reason for patient still in hospital (select all that apply): Patient trending condition, Laboratory test, Treatment, and Consult recommendations  Discharge Plan A: Home Health   Discharge Delays: None known at this time              Adina Wolff PA-C  Department of Hospital Medicine   [facility]

## 2024-05-14 NOTE — SUBJECTIVE & OBJECTIVE
Past Medical History:   Diagnosis Date    Acute hypoxemic respiratory failure 6/26/2021    Acute superficial venous thrombosis of lower extremity, bilateral 1/25/2022    Aortic atherosclerosis     Arthritis     knee joint pain    Asthma-COPD overlap syndrome 8/22/2022    Breast cancer 2002    left breast & lymph nodes-s/p sx with chemo    Bronchitis     with flu-2/2014    Cataracts, bilateral     Chronic anticoagulation 5/4/2022    Chronic diastolic heart failure 12/24/2019    Chronic kidney disease, stage 3     History of chemotherapy     last treatment 12/2002 (had 8 treatments)    Hyperlipidemia 11/20/2016    Hypertension     Lymphedema of both lower extremities 1/25/2022    Nephrolithiasis 6/2/2014    Osteoporosis     Paroxysmal atrial fibrillation 6/7/2021    Renal calculi     hematuria    Renal osteodystrophy 1/14/2016    Venous stasis dermatitis of both lower extremities 1/25/2022    Vitamin D insufficiency        Past Surgical History:   Procedure Laterality Date    BREAST BIOPSY Left 2002    core bx, +    BREAST BIOPSY Right 2018    core    BREAST BIOPSY Right 2019    BREAST LUMPECTOMY Left 2002    CYSTOSCOPY  4/28/2022    Procedure: CYSTOSCOPY;  Surgeon: Vik Ware MD;  Location: [facility] OR [address] [place] [address];  Service: Urology;;    CYSTOSCOPY  5/30/2022    Procedure: CYSTOSCOPY;  Surgeon: Bonnie Knutson MD;  Location: [facility] OR [address] [place] [address];  Service: Urology;;    LASER LITHOTRIPSY  5/30/2022    Procedure: LITHOTRIPSY, USING LASER;  Surgeon: Bonnie Knutson MD;  Location: [facility] OR [address] [place] [address];  Service: Urology;;    LITHOTRIPSY      MASTECTOMY Left 06/2002    left-& lymph node dissection    REPLACEMENT OF STENT Right 5/30/2022    Procedure: REPLACEMENT, STENT;  Surgeon: Bonnie Knutson MD;  Location: [facility] OR [address] [place] [address];  Service: Urology;  Laterality: Right;    RETROGRADE PYELOGRAPHY Right 4/28/2022    Procedure: PYELOGRAM, RETROGRADE;  Surgeon: Vik Ware MD;  Location: [facility] OR [address] [place] [address];   Service: Urology;  Laterality: Right;    ROBOT-ASSISTED LAPAROSCOPIC PARTIAL NEPHRECTOMY USING DA JESÚS XI Right 3/11/2021    Procedure: XI ROBOTIC NEPHRECTOMY, PARTIAL;  Surgeon: Taz Ortega MD;  Location: [facility] OR [address] [place] [address];  Service: Urology;  Laterality: Right;  4hr/ gen with regional  [facility] confirmation:  [id number] for 11:15am case NC    URETEROSCOPIC REMOVAL OF URETERIC CALCULUS Right 5/30/2022    Procedure: REMOVAL, CALCULUS, URETER, URETEROSCOPIC;  Surgeon: Bonnie Knutson MD;  [facility];  Service: Urology;  Laterality: Right;    ureteroscopy Bilateral     6.14    URETEROSCOPY Right 5/30/2022    Procedure: URETEROSCOPY;  Surgeon: Bonnie Knutson MD;  Location: [facility] OR [address] [place] [address];  Service: Urology;  Laterality: Right;       Review of patient's allergies indicates:   Allergen Reactions    Adhesive Rash     Pt states she removed a LATEX bandaid and the skin beneath was swollen and red. No other latex causes a reaction.     Current Facility-Administered Medications   Medication Frequency    acetaminophen tablet 1,000 mg Q8H PRN    acetaminophen tablet 650 mg Q4H PRN    albuterol-ipratropium 2.5 mg-0.5 mg/3 mL nebulizer solution 3 mL Q4H PRN    apixaban tablet 2.5 mg BID    aspirin EC tablet 81 mg Daily    atorvastatin tablet 20 mg Daily    bisacodyL suppository 10 mg Daily PRN    dextrose 10% bolus 125 mL 125 mL PRN    dextrose 10% bolus 250 mL 250 mL PRN    fluticasone furoate-vilanteroL 100-25 mcg/dose diskus inhaler 1 puff Daily    glucagon (human recombinant) injection 1 mg PRN    glucose chewable tablet 16 g PRN    glucose chewable tablet 24 g PRN    melatonin tablet 6 mg Nightly PRN    methocarbamoL tablet 500 mg QID PRN    metoprolol succinate (TOPROL-XL) 24 hr tablet 100 mg Daily    miconazole NITRATE 2 % top powder BID    ondansetron disintegrating tablet 8 mg Q8H PRN    polyethylene glycol packet 17 g Daily    potassium bicarbonate disintegrating tablet 20 mEq Once     promethazine tablet 25 mg Q6H PRN    sodium chloride 0.9% flush 10 mL PRN    tamsulosin 24 hr capsule 0.4 mg Daily     Family History       Problem Relation (Age of Onset)    Arthritis Mother, Sister    Bone cancer Mother    Breast cancer Mother, Sister    Cancer Father    Crohn's disease Daughter    Fibroids Daughter    No Known Problems Brother, Daughter          Tobacco Use    Smoking status: Former     Current packs/day: 0.00     Average packs/day: 1 pack/day for 50.0 years (50.0 ttl pk-yrs)     Types: Cigarettes     Start date:      Quit date: 2009     Years since quittin.9    Smokeless tobacco: Never   Substance and Sexual Activity    Alcohol use: No    Drug use: No    Sexual activity: Not Currently     Partners: Male     Birth control/protection: Partner-Vasectomy     Review of Systems   Constitutional:  Positive for fatigue. Negative for activity change, appetite change, chills and fever.   HENT:  Negative for sore throat and trouble swallowing.    Eyes:  Negative for pain and visual disturbance.   Respiratory:  Positive for shortness of breath. Negative for cough and wheezing.         Reports dyspnea on exertion.    Cardiovascular:  Positive for leg swelling. Negative for chest pain and palpitations.        Reports lower extremity edema has improved since admission.    Gastrointestinal:  Negative for abdominal distention, abdominal pain, constipation, diarrhea, nausea and vomiting.   Genitourinary:  Positive for difficulty urinating. Negative for decreased urine volume, dysuria, frequency, hematuria and urgency.   Musculoskeletal:  Negative for back pain, gait problem and neck pain.   Skin:  Positive for color change and rash.   Neurological:  Positive for weakness (non-focal). Negative for dizziness, syncope, speech difficulty and light-headedness.   Psychiatric/Behavioral:  Negative for confusion. The patient is not nervous/anxious.      Objective:     Vital Signs (Most Recent):  Temp: 97.5  °F (36.4 °C) (05/14/24 1127)  Pulse: 92 (05/14/24 1320)  Resp: 20 (05/14/24 1320)  BP: 104/68 (05/14/24 1127)  SpO2: 96 % (05/14/24 1320) Vital Signs (24h Range):  Temp:  [97.5 °F (36.4 °C)-97.9 °F (36.6 °C)] 97.5 °F (36.4 °C)  Pulse:  [] 92  Resp:  [16-20] 20  SpO2:  [92 %-96 %] 96 %  BP: ()/(54-68) 104/68     Weight: 82.6 kg (182 lb 1.6 oz) (05/14/24 0400)  Body mass index is 31.26 kg/m².  Body surface area is 1.93 meters squared.    I/O last 3 completed shifts:  In: 240 [P.O.:240]  Out: 2250 [Urine:2250]     Physical Exam  Vitals and nursing note reviewed.   Constitutional:       General: She is awake. She is not in acute distress.     Appearance: She is well-developed. She is obese. She is ill-appearing. She is not diaphoretic.      Interventions: Nasal cannula in place.   HENT:      Head: Normocephalic and atraumatic.      Right Ear: External ear normal.      Left Ear: External ear normal.      Nose:      Comments: Nasal cannula in place.     Mouth/Throat:      Mouth: Mucous membranes are moist.      Pharynx: Oropharynx is clear. No oropharyngeal exudate or posterior oropharyngeal erythema.   Eyes:      General: No scleral icterus.        Right eye: No discharge.         Left eye: No discharge.      Extraocular Movements: Extraocular movements intact.      Conjunctiva/sclera: Conjunctivae normal.   Neck:      Comments: US of bilateral IJs revealed readily collapsible.   Cardiovascular:      Rate and Rhythm: Normal rate.      Heart sounds: Murmur heard.   Pulmonary:      Effort: Pulmonary effort is normal. No respiratory distress.      Breath sounds: No wheezing, rhonchi or rales.   Abdominal:      General: Bowel sounds are normal. There is no distension.      Palpations: Abdomen is soft.      Tenderness: There is no abdominal tenderness.   Genitourinary:     Comments: Muniz catheter in place.  Musculoskeletal:      Cervical back: Neck supple.      Right lower leg: Edema present.      Left lower leg:  Edema present.   Skin:     General: Skin is warm and dry.      Coloration: Skin is not jaundiced.      Findings: Bruising, erythema and rash present.   Neurological:      General: No focal deficit present.      Mental Status: She is alert. Mental status is at baseline.      Cranial Nerves: No cranial nerve deficit.      Motor: No weakness.   Psychiatric:         Mood and Affect: Mood normal.         Behavior: Behavior normal. Behavior is cooperative.              Significant Labs:  BMP:   Recent Labs   Lab 05/14/24  0653 05/14/24  0746   GLU 91  --      --    K 3.7  --      --    CO2 25  --    BUN 35*  --    CREATININE 2.1*  --    CALCIUM 9.4  --    MG  --  2.1     CBC:   Recent Labs   Lab 05/12/24  0758   WBC 8.45   RBC 3.88*   HGB 11.7*   HCT 37.6      MCV 97   MCH 30.2   MCHC 31.1*     CMP:   Recent Labs   Lab 05/07/24  1752 05/08/24  0514 05/14/24  0653   GLU 96   < > 91   CALCIUM 10.3   < > 9.4   ALBUMIN 3.0*  --   --    PROT 6.4  --   --       < > 137   K 4.2   < > 3.7   CO2 26   < > 25      < > 102   BUN 26*   < > 35*   CREATININE 1.8*   < > 2.1*   ALKPHOS 98  --   --    ALT 18  --   --    AST 29  --   --    BILITOT 0.8  --   --     < > = values in this interval not displayed.     LFTs:   Recent Labs   Lab 05/07/24  1752   ALT 18   AST 29   ALKPHOS 98   BILITOT 0.8   PROT 6.4   ALBUMIN 3.0*     Microbiology Results (last 7 days)       Procedure Component Value Units Date/Time    Blood culture [[id number]] Collected: 05/09/24 1433    Order Status: Completed Specimen: Blood Updated: 05/13/24 1612     Blood Culture, Routine No Growth to date      No Growth to date      No Growth to date      No Growth to date      No Growth to date    Blood culture [[id number]] Collected: 05/09/24 1434    Order Status: Completed Specimen: Blood Updated: 05/13/24 1612     Blood Culture, Routine No Growth to date      No Growth to date      No Growth to date      No Growth to date      No Growth to  date    Blood culture [[id number]] Collected: 05/07/24 2251    Order Status: Completed Specimen: Blood from Peripheral, Forearm, Right Updated: 05/13/24 0612     Blood Culture, Routine No growth after 5 days.    Blood culture [[id number]]  (Abnormal) Collected: 05/07/24 2251    Order Status: Completed Specimen: Blood from Peripheral, Hand, Right Updated: 05/11/24 1057     Blood Culture, Routine Gram stain aer bottle: Gram positive cocci in clusters resembling Staph      Results called to and read back by:Josephine Minor LPN 05/09/2024  13:59      MICROCOCCUS SPECIES  Organism is a probable contaminant      Urine culture [[id number]]  (Abnormal)  (Susceptibility) Collected: 05/08/24 0242    Order Status: Completed Specimen: Urine Updated: 05/10/24 1241     Urine Culture, Routine STAPHYLOCOCCUS AUREUS  > 100,000 cfu/ml        ENTEROCOCCUS FAECALIS  50,000 - 99,999 cfu/ml      Narrative:      Specimen Source->Urine    MRSA/SA Rapid ID by PCR from Blood culture [[id number]] Collected: 05/09/24 1359    Order Status: Completed Updated: 05/09/24 2127     Staph aureus ID by PCR Negative     Methicillin Resistant ID by PCR Negative          Specimen (24h ago, onward)      None          Recent Labs   Lab 05/08/24  0242   COLORU Yellow   SPECGRAV 1.005   PHUR 7.0   PROTEINUA Negative   BACTERIA Moderate*   NITRITE Positive*   LEUKOCYTESUR 3+*   HYALINECASTS 1     Significant Imaging:  I have reviewed all imagining in the last 24 hours.

## 2024-05-14 NOTE — PT/OT/SLP PROGRESS
Physical Therapy Treatment    Patient Name:  Misa Barajas   MRN:  [id number]    Recommendations:     Discharge Recommendations: Low Intensity Therapy  Discharge Equipment Recommendations: walker, rolling  Barriers to discharge: Increased level of assist, Decreased caregiver support, and Ongoing medical treatment     Assessment:     Misa Barajas is a 78 y.o. female admitted with a medical diagnosis of Acute renal failure superimposed on stage 3a chronic kidney disease.  She presents with the following impairments/functional limitations: weakness, impaired endurance, impaired self care skills, impaired functional mobility, gait instability, impaired balance, decreased lower extremity function, decreased safety awareness, pain, decreased ROM, impaired skin, impaired cardiopulmonary response to activity .. Pt Progressing with PT Intervention. Pt Progressing with improving gait distance. Pt would continue to benefit from skilled PT to address overall functional mobility, goals , and to return to functional baseline.  Goals remain appropriate.     Rehab Prognosis: Good; patient would benefit from acute skilled PT services to address these deficits and reach maximum level of function.    Recent Surgery: * No surgery found *      Plan:     During this hospitalization, patient to be seen 3 x/week to address the identified rehab impairments via gait training, therapeutic activities, therapeutic exercises, neuromuscular re-education and progress toward the following goals:    Plan of Care Expires:  05/25/24    Subjective     Chief Complaint: fatigue  Patient/Family Comments/goals: I will try  Pain/Comfort:  Pain Rating 1:  (not rated)  Location - Side 1: Left  Location 1: knee  Pain Addressed 1: Reposition, Distraction      Objective:     Communicated with RN prior to session.  Patient found HOB elevated with  (oxygen; PureWick; telemetry) upon PT entry to room.     General Precautions: Standard, fall  Orthopedic  Precautions: N/A  Braces: N/A  Respiratory Status: Nasal cannula, flow 2 L/min     Functional Mobility:  Bed Mobility:     Rolling Right: contact guard assistance  Scooting: contact guard assistance  Supine to Sit: contact guard assistance  Transfers:     Sit to Stand:  minimum assistance with rolling walker  Gait: Patient ambulated 30 ft with rolling walker and minimum assistance. Patient demonstrates occasional unsteady gait, decreased step length, decreased weight shift, ambulates outside VITOR of RW, flexed posture, decreased faith, and antalgic gait. All lines remained intact throughout ambulation trail.      AM-PAC 6 CLICK MOBILITY  Turning over in bed (including adjusting bedclothes, sheets and blankets)?: 3  Sitting down on and standing up from a chair with arms (e.g., wheelchair, bedside commode, etc.): 3  Moving from lying on back to sitting on the side of the bed?: 3  Moving to and from a bed to a chair (including a wheelchair)?: 3  Need to walk in hospital room?: 3  Climbing 3-5 steps with a railing?: 2  Basic Mobility Total Score: 17       Treatment & Education:  Therapist provided instruction and educated of  patient on progress, safety,d/c,PT POC, proper body mechanics, energy conservation, and fall prevention strategies during tasks listed above, on the effects of prolonged immobility and the importance of performing OOB activity and exercises to promote healing and reduce recovery time     Patient  facilitated therex  seated in bedside chair B LE AROM AP, LAQ, Hip Flexion, Hip Abd/Add. Patient required skilled PTA for instruction of exercises and appropriate cues to perform exercises safely, sequencing and appropriately.   Exercises performed to develop and maintain pt's strength, endurance and flexibility.  Updated white board with appropriate PT mobility information for medical team notification   Donned an extra gown   Call nursing/pct to transfer to chair/use bathroom. Pt stated  understanding  Bedside table in front of patient and area set up for function, convenience, and safety. RN aware of patient's mobility needs and status. Questions/concerns addressed within PTA scope of practice; patient  with no further questions. Time was provided for active listening, discussion of health disposition, and discussion of safe discharge. Pt?verbalized?agreement .    Patient left up in chair with all lines intact, call button in reach, and nsg notified..    GOALS:   Multidisciplinary Problems       Physical Therapy Goals          Problem: Physical Therapy    Goal Priority Disciplines Outcome Goal Variances Interventions   Physical Therapy Goal     PT, PT/OT Progressing     Description: Goals to be met by: 2024     Patient will increase functional independence with mobility by performin. Sit to stand transfer with Stand-by Assistance. -In progress  2. Bed to chair transfer with Stand-by Assistance using Rolling Walker. -In progress  3. Gait x 25 feet with Stand-by Assistance using Rolling Walker. -In progress   4. Lower extremity exercise program x20 reps per handout, with supervision. -In progress                       Time Tracking:     PT Received On: 24  PT Start Time: 822     PT Stop Time: 846  PT Total Time (min): 24 min     Billable Minutes: Gait Training 15 and Therapeutic Activity 9    Treatment Type: Treatment  PT/PTA: PTA     Number of PTA visits since last PT visit: 1     2024

## 2024-05-14 NOTE — CONSULTS
[facility]  Nephrology  Consult Note    Patient Name: Misa Barajas  MRN: [id number]  Admission Date: 5/7/2024  Hospital Length of Stay: 6 days  Attending Provider: Fiorella Cobian MD   Primary Care Physician: Celia Carlisle MD  Principal Problem:Acute renal failure superimposed on stage 3b chronic kidney disease    Inpatient consult to Nephrology  Consult performed by: Cal Iniguez MD  Consult ordered by: Adina Wolff PA-C        Subjective:     HPI: Ms Barajas is an obese (BMI ~31) 78-year-old woman with HFpEF (most recent TTE with EF 55% with G2DD), mild to moderate tricuspid regurgitation, hypertension, HLD, paroxsymal atrial fibrillation on Eliquis, COPD on supplemental oxygen at home with 2 liters via nasal cannula, atherosclerotic disease, chronic lymphedema, history of left sided breast cancer, CKD IIIb (baseline creatinine ~1.5) as well as several other co-morbid conditions who was admitted to [facility] [place] on 5/7 with acute on chronic CHF exacerbation, MARQUEZ with creatinine 1.8, UTI growing S. aureus & Enterococcus faecalis and concern for right lower extremity cellulitis. She was management in the usually fashion with IV diuresis with Lasix in addition to receiving several different antibiotics including ampicillin, Augmentin, Rocephin, cephalexin, Bactrim and vancomycin. Her admission has been complicated by urinary retention necessitating Muniz catheter placement, hypotension (lowest document BP 87/54 mmHg) and worsening renal function for which Nephrology has been consulted. Creatinine trend this admission is as follows 1.8 > 1.5 > 1.2 > 1.3 > 1.9 > 1.8 > 1.9 > 2.1. Mcgarry stain was noted to be positive for occasional eosinophils.  Urinary sediment was assessed which demonstrated innumerable WBCs with some within waxy casts, non dysmorphic RBCs, waxy casts and occasional bacteria.     Past Medical History:   Diagnosis Date    Acute hypoxemic respiratory failure 6/26/2021    Acute  superficial venous thrombosis of lower extremity, bilateral 1/25/2022    Aortic atherosclerosis     Arthritis     knee joint pain    Asthma-COPD overlap syndrome 8/22/2022    Breast cancer 2002    left breast & lymph nodes-s/p sx with chemo    Bronchitis     with flu-2/2014    Cataracts, bilateral     Chronic anticoagulation 5/4/2022    Chronic diastolic heart failure 12/24/2019    Chronic kidney disease, stage 3     History of chemotherapy     last treatment 12/2002 (had 8 treatments)    Hyperlipidemia 11/20/2016    Hypertension     Lymphedema of both lower extremities 1/25/2022    Nephrolithiasis 6/2/2014    Osteoporosis     Paroxysmal atrial fibrillation 6/7/2021    Renal calculi     hematuria    Renal osteodystrophy 1/14/2016    Venous stasis dermatitis of both lower extremities 1/25/2022    Vitamin D insufficiency        Past Surgical History:   Procedure Laterality Date    BREAST BIOPSY Left 2002    core bx, +    BREAST BIOPSY Right 2018    core    BREAST BIOPSY Right 2019    BREAST LUMPECTOMY Left 2002    CYSTOSCOPY  4/28/2022    Procedure: CYSTOSCOPY;  Surgeon: Vik Ware MD;  Location: [facility] OR [address] [place] [address];  Service: Urology;;    CYSTOSCOPY  5/30/2022    Procedure: CYSTOSCOPY;  Surgeon: Bonnie Knutson MD;  Location: [facility] OR [address] [place] [address];  Service: Urology;;    LASER LITHOTRIPSY  5/30/2022    Procedure: LITHOTRIPSY, USING LASER;  Surgeon: Bonnie Kntuson MD;  Location: [facility] OR [address] [place] [address];  Service: Urology;;    LITHOTRIPSY      MASTECTOMY Left 06/2002    left-& lymph node dissection    REPLACEMENT OF STENT Right 5/30/2022    Procedure: REPLACEMENT, STENT;  Surgeon: Bonnie Knutson MD;  Location: [facility] OR [address] [place] [address];  Service: Urology;  Laterality: Right;    RETROGRADE PYELOGRAPHY Right 4/28/2022    Procedure: PYELOGRAM, RETROGRADE;  Surgeon: Vik Ware MD;  Location: [facility] OR [address] [place] [address];  Service: Urology;  Laterality: Right;    ROBOT-ASSISTED LAPAROSCOPIC PARTIAL NEPHRECTOMY USING DA  JESÚS XI Right 3/11/2021    Procedure: XI ROBOTIC NEPHRECTOMY, PARTIAL;  Surgeon: Taz Ortega MD;  Location: [facility] OR 2ND FLR;  Service: Urology;  Laterality: Right;  4hr/ gen with regional  [facility] confirmation:  [id number] for 11:15am case NC    URETEROSCOPIC REMOVAL OF URETERIC CALCULUS Right 5/30/2022    Procedure: REMOVAL, CALCULUS, URETER, URETEROSCOPIC;  Surgeon: Bonnie Knutson MD;  Location: [facility] OR 1ST FLR;  Service: Urology;  Laterality: Right;    ureteroscopy Bilateral     6.14    URETEROSCOPY Right 5/30/2022    Procedure: URETEROSCOPY;  Surgeon: Bonnie Knutson MD;  Location: [facility] OR 1ST FLR;  Service: Urology;  Laterality: Right;       Review of patient's allergies indicates:   Allergen Reactions    Adhesive Rash     Pt states she removed a LATEX bandaid and the skin beneath was swollen and red. No other latex causes a reaction.     Current Facility-Administered Medications   Medication Frequency    acetaminophen tablet 1,000 mg Q8H PRN    acetaminophen tablet 650 mg Q4H PRN    albuterol-ipratropium 2.5 mg-0.5 mg/3 mL nebulizer solution 3 mL Q4H PRN    apixaban tablet 2.5 mg BID    aspirin EC tablet 81 mg Daily    atorvastatin tablet 20 mg Daily    bisacodyL suppository 10 mg Daily PRN    dextrose 10% bolus 125 mL 125 mL PRN    dextrose 10% bolus 250 mL 250 mL PRN    fluticasone furoate-vilanteroL 100-25 mcg/dose diskus inhaler 1 puff Daily    glucagon (human recombinant) injection 1 mg PRN    glucose chewable tablet 16 g PRN    glucose chewable tablet 24 g PRN    melatonin tablet 6 mg Nightly PRN    methocarbamoL tablet 500 mg QID PRN    metoprolol succinate (TOPROL-XL) 24 hr tablet 100 mg Daily    miconazole NITRATE 2 % top powder BID    ondansetron disintegrating tablet 8 mg Q8H PRN    polyethylene glycol packet 17 g Daily    potassium bicarbonate disintegrating tablet 20 mEq Once    promethazine tablet 25 mg Q6H PRN    sodium chloride 0.9% flush 10 mL PRN    tamsulosin 24 hr  capsule 0.4 mg Daily     Family History       Problem Relation (Age of Onset)    Arthritis Mother, Sister    Bone cancer Mother    Breast cancer Mother, Sister    Cancer Father    Crohn's disease Daughter    Fibroids Daughter    No Known Problems Brother, Daughter          Tobacco Use    Smoking status: Former     Current packs/day: 0.00     Average packs/day: 1 pack/day for 50.0 years (50.0 ttl pk-yrs)     Types: Cigarettes     Start date:      Quit date: 2009     Years since quittin.9    Smokeless tobacco: Never   Substance and Sexual Activity    Alcohol use: No    Drug use: No    Sexual activity: Not Currently     Partners: Male     Birth control/protection: Partner-Vasectomy     Review of Systems   Constitutional:  Positive for fatigue. Negative for activity change, appetite change, chills and fever.   HENT:  Negative for sore throat and trouble swallowing.    Eyes:  Negative for pain and visual disturbance.   Respiratory:  Positive for shortness of breath. Negative for cough and wheezing.         Reports dyspnea on exertion.    Cardiovascular:  Positive for leg swelling. Negative for chest pain and palpitations.        Reports lower extremity edema has improved since admission.    Gastrointestinal:  Negative for abdominal distention, abdominal pain, constipation, diarrhea, nausea and vomiting.   Genitourinary:  Positive for difficulty urinating. Negative for decreased urine volume, dysuria, frequency, hematuria and urgency.   Musculoskeletal:  Negative for back pain, gait problem and neck pain.   Skin:  Positive for color change and rash.   Neurological:  Positive for weakness (non-focal). Negative for dizziness, syncope, speech difficulty and light-headedness.   Psychiatric/Behavioral:  Negative for confusion. The patient is not nervous/anxious.      Objective:     Vital Signs (Most Recent):  Temp: 97.5 °F (36.4 °C) (24 1127)  Pulse: 92 (24 1320)  Resp: 20 (24 1320)  BP: 104/68  (05/14/24 1127)  SpO2: 96 % (05/14/24 1320) Vital Signs (24h Range):  Temp:  [97.5 °F (36.4 °C)-97.9 °F (36.6 °C)] 97.5 °F (36.4 °C)  Pulse:  [] 92  Resp:  [16-20] 20  SpO2:  [92 %-96 %] 96 %  BP: ()/(54-68) 104/68     Weight: 82.6 kg (182 lb 1.6 oz) (05/14/24 0400)  Body mass index is 31.26 kg/m².  Body surface area is 1.93 meters squared.    I/O last 3 completed shifts:  In: 240 [P.O.:240]  Out: 2250 [Urine:2250]     Physical Exam  Vitals and nursing note reviewed.   Constitutional:       General: She is awake. She is not in acute distress.     Appearance: She is well-developed. She is obese. She is ill-appearing. She is not diaphoretic.      Interventions: Nasal cannula in place.   HENT:      Head: Normocephalic and atraumatic.      Right Ear: External ear normal.      Left Ear: External ear normal.      Nose:      Comments: Nasal cannula in place.     Mouth/Throat:      Mouth: Mucous membranes are moist.      Pharynx: Oropharynx is clear. No oropharyngeal exudate or posterior oropharyngeal erythema.   Eyes:      General: No scleral icterus.        Right eye: No discharge.         Left eye: No discharge.      Extraocular Movements: Extraocular movements intact.      Conjunctiva/sclera: Conjunctivae normal.   Neck:      Comments: US of bilateral IJs revealed readily collapsible.   Cardiovascular:      Rate and Rhythm: Normal rate.      Heart sounds: Murmur heard.   Pulmonary:      Effort: Pulmonary effort is normal. No respiratory distress.      Breath sounds: No wheezing, rhonchi or rales.   Abdominal:      General: Bowel sounds are normal. There is no distension.      Palpations: Abdomen is soft.      Tenderness: There is no abdominal tenderness.   Genitourinary:     Comments: Muniz catheter in place.  Musculoskeletal:      Cervical back: Neck supple.      Right lower leg: Edema present.      Left lower leg: Edema present.   Skin:     General: Skin is warm and dry.      Coloration: Skin is not  jaundiced.      Findings: Bruising, erythema and rash present.   Neurological:      General: No focal deficit present.      Mental Status: She is alert. Mental status is at baseline.      Cranial Nerves: No cranial nerve deficit.      Motor: No weakness.   Psychiatric:         Mood and Affect: Mood normal.         Behavior: Behavior normal. Behavior is cooperative.              Significant Labs:  BMP:   Recent Labs   Lab 05/14/24  0653 05/14/24  0746   GLU 91  --      --    K 3.7  --      --    CO2 25  --    BUN 35*  --    CREATININE 2.1*  --    CALCIUM 9.4  --    MG  --  2.1     CBC:   Recent Labs   Lab 05/12/24  0758   WBC 8.45   RBC 3.88*   HGB 11.7*   HCT 37.6      MCV 97   MCH 30.2   MCHC 31.1*     CMP:   Recent Labs   Lab 05/07/24  1752 05/08/24  0514 05/14/24  0653   GLU 96   < > 91   CALCIUM 10.3   < > 9.4   ALBUMIN 3.0*  --   --    PROT 6.4  --   --       < > 137   K 4.2   < > 3.7   CO2 26   < > 25      < > 102   BUN 26*   < > 35*   CREATININE 1.8*   < > 2.1*   ALKPHOS 98  --   --    ALT 18  --   --    AST 29  --   --    BILITOT 0.8  --   --     < > = values in this interval not displayed.     LFTs:   Recent Labs   Lab 05/07/24  1752   ALT 18   AST 29   ALKPHOS 98   BILITOT 0.8   PROT 6.4   ALBUMIN 3.0*     Microbiology Results (last 7 days)       Procedure Component Value Units Date/Time    Blood culture [[id number]] Collected: 05/09/24 1433    Order Status: Completed Specimen: Blood Updated: 05/13/24 1612     Blood Culture, Routine No Growth to date      No Growth to date      No Growth to date      No Growth to date      No Growth to date    Blood culture [[id number]] Collected: 05/09/24 1434    Order Status: Completed Specimen: Blood Updated: 05/13/24 1612     Blood Culture, Routine No Growth to date      No Growth to date      No Growth to date      No Growth to date      No Growth to date    Blood culture [[id number]] Collected: 05/07/24 2251    Order Status: Completed  Specimen: Blood from Peripheral, Forearm, Right Updated: 05/13/24 0612     Blood Culture, Routine No growth after 5 days.    Blood culture [[id number]]  (Abnormal) Collected: 05/07/24 2251    Order Status: Completed Specimen: Blood from Peripheral, Hand, Right Updated: 05/11/24 1057     Blood Culture, Routine Gram stain aer bottle: Gram positive cocci in clusters resembling Staph      Results called to and read back by:Josephine Minor LPN 05/09/2024  13:59      MICROCOCCUS SPECIES  Organism is a probable contaminant      Urine culture [[id number]]  (Abnormal)  (Susceptibility) Collected: 05/08/24 0242    Order Status: Completed Specimen: Urine Updated: 05/10/24 1241     Urine Culture, Routine STAPHYLOCOCCUS AUREUS  > 100,000 cfu/ml        ENTEROCOCCUS FAECALIS  50,000 - 99,999 cfu/ml      Narrative:      Specimen Source->Urine    MRSA/SA Rapid ID by PCR from Blood culture [[id number]] Collected: 05/09/24 1359    Order Status: Completed Updated: 05/09/24 2127     Staph aureus ID by PCR Negative     Methicillin Resistant ID by PCR Negative          Specimen (24h ago, onward)      None          Recent Labs   Lab 05/08/24  0242   COLORU Yellow   SPECGRAV 1.005   PHUR 7.0   PROTEINUA Negative   BACTERIA Moderate*   NITRITE Positive*   LEUKOCYTESUR 3+*   HYALINECASTS 1     Significant Imaging:  I have reviewed all imagining in the last 24 hours.  Assessment/Plan:     Cardiac/Vascular  Essential hypertension  - management per primary team    Renal/  * Acute renal failure superimposed on stage 3b chronic kidney disease  Urinary retention  78-year-old woman with HFpEF (most recent TTE with EF 55% with G2DD), mild to moderate tricuspid regurgitation, HTN, paroxsymal atrial fibrillation on Eliquis, COPD on 2 liters O2, and CKD IIIb (baseline creatinine ~1.5) admitted to [facility] on 5/7 with acute on chronic CHF exacerbation, MARQUEZ with creatinine 1.8, UTI growing S. aureus & Enterococcus faecalis and concern for right lower  extremity cellulitis. She was management in the usually fashion with IV diuresis with Lasix in addition to receiving several different antibiotics including ampicillin, Augmentin, Rocephin, cephalexin, DS Bactrim and vancomycin. Her admission has been complicated by urinary retention necessitating Muniz catheter placement, hypotension (lowest document BP 87/54 mmHg) and worsening renal function for which Nephrology has been consulted. Creatinine trend this admission is as follows 1.8 > 1.5 > 1.2 > 1.3 > 1.9 > 1.8 > 1.9 > 2.1. Mcgarry stain was noted to be positive for occasional eosinophils.  Urinary sediment was assessed which demonstrated innumerable WBCs with some within waxy casts, non dysmorphic RBCs, waxy casts and occasional bacteria.     Plan/Recommendations:  - would hold on further diuresis in addition would no give IVFs at this time  - will follow-up UPCR and retroperitoneal US  - agree with stopping antibiotics especially double strength Bactrim  - would keep Muniz in place in light of urinary retention  - monitor renal function and UOP over next 12 hours and reassess   - daily RFP and magnesium levels  - renal diet/tube feeds when not NPO with volume restriction per primary team  - strict I/O's and daily weights  - renally all dose medications to eGFR   - avoid nephrotoxic agents wean feasible (i.e. NSAIDs, intra-arterial contrast, supra-therapeutic vancomycin levels, etc.)  - no acute indications for RRT at this time however will continue to monitor closely    Thank you for your consult. I will follow-up with patient. Please contact us if you have any additional questions.    Cal Iniguez MD  Nephrology  [facility]

## 2024-05-14 NOTE — ASSESSMENT & PLAN NOTE
78-year-old woman with HFpEF (most recent TTE with EF 55% with G2DD), mild to moderate tricuspid regurgitation, HTN, paroxsymal atrial fibrillation on Eliquis, COPD on 2 liters O2, and CKD IIIb (baseline creatinine ~1.5) admitted [place] [facility] [place] 5/7 with acute on chronic CHF exacerbation, MARQUEZ with creatinine 1.8, UTI growing S. aureus & Enterococcus faecalis and concern for right lower extremity cellulitis. She was management in the usually fashion with IV diuresis with Lasix in addition to receiving several different antibiotics including ampicillin, Augmentin, Rocephin, cephalexin, DS Bactrim and vancomycin. Her admission has been complicated by urinary retention necessitating Muniz catheter placement, hypotension (lowest document BP 87/54 mmHg) and worsening renal function for which Nephrology has been consulted. Creatinine trend this admission is as follows 1.8 > 1.5 > 1.2 > 1.3 > 1.9 > 1.8 > 1.9 > 2.1. Mcgarry stain was noted to be positive for occasional eosinophils.  Urinary sediment was assessed which demonstrated innumerable WBCs with some within waxy casts, non dysmorphic RBCs, waxy casts and occasional bacteria.     Plan/Recommendations:  - would hold on further diuresis in addition would no give IVFs at this time  - will follow-up UPCR and retroperitoneal US  - agree with stopping antibiotics especially double strength Bactrim  - would keep Muniz in place in light of urinary retention  - monitor renal function and UOP over next 12 hours and reassess   - daily RFP and magnesium levels  - renal diet/tube feeds when not NPO with volume restriction per primary team  - strict I/O's and daily weights  - renally all dose medications to eGFR   - avoid nephrotoxic agents wean feasible (i.e. NSAIDs, intra-arterial contrast, supra-therapeutic vancomycin levels, etc.)  - no acute indications for RRT at this time however will continue to monitor closely

## 2024-05-14 NOTE — SUBJECTIVE & OBJECTIVE
Interval History: Patient seen and assessed at bedside. Afebrile, soft BP, otherwise VSS on home 2L NC. Patient reports no complaints besides mild REED. Cr. Bump 2.1 despite additional diuresis. Wrights stain with eosinophils - possible AIN. Discontinued all abx at this time. Nephrology consulted.        Review of Systems   Constitutional:  Negative for activity change, chills and fever.   HENT:  Negative for trouble swallowing.    Eyes:  Negative for photophobia and visual disturbance.   Respiratory:  Negative for cough, chest tightness and shortness of breath (improved).         +REED   Cardiovascular:  Positive for leg swelling. Negative for chest pain and palpitations.   Gastrointestinal:  Negative for abdominal pain, constipation, diarrhea, nausea and vomiting.   Genitourinary:  Negative for dysuria, frequency and hematuria.   Musculoskeletal:  Negative for back pain, gait problem and neck pain.   Skin:  Positive for color change. Negative for rash and wound.   Neurological:  Negative for dizziness, syncope, speech difficulty and light-headedness.   Psychiatric/Behavioral:  Negative for agitation and confusion. The patient is not nervous/anxious.      Objective:     Vital Signs (Most Recent):  Temp: 97.8 °F (36.6 °C) (05/14/24 0811)  Pulse: 88 (05/14/24 0811)  Resp: 16 (05/14/24 0811)  BP: (!) 100/58 (05/14/24 0811)  SpO2: 96 % (05/14/24 0811) Vital Signs (24h Range):  Temp:  [97.6 °F (36.4 °C)-97.9 °F (36.6 °C)] 97.8 °F (36.6 °C)  Pulse:  [] 88  Resp:  [16-18] 16  SpO2:  [92 %-96 %] 96 %  BP: ()/(54-65) 100/58     Weight: 82.6 kg (182 lb 1.6 oz)  Body mass index is 31.26 kg/m².    Intake/Output Summary (Last 24 hours) at 5/14/2024 0942  Last data filed at 5/14/2024 0614  Gross per 24 hour   Intake --   Output 1950 ml   Net -1950 ml         Physical Exam  Vitals and nursing note reviewed.   Constitutional:       General: She is not in acute distress.     Appearance: She is well-developed.   HENT:       Head: Normocephalic and atraumatic.      Mouth/Throat:      Pharynx: No oropharyngeal exudate.   Eyes:      General: No scleral icterus.     Conjunctiva/sclera: Conjunctivae normal.   Cardiovascular:      Rate and Rhythm: Normal rate and regular rhythm.      Heart sounds: Normal heart sounds.   Pulmonary:      Effort: Pulmonary effort is normal. No respiratory distress.      Breath sounds: Normal breath sounds. No wheezing or rales.      Comments: NC in place  Abdominal:      General: Bowel sounds are normal. There is no distension.      Palpations: Abdomen is soft.      Tenderness: There is no abdominal tenderness.   Genitourinary:     Comments: Muniz in place  Musculoskeletal:         General: No tenderness. Normal range of motion.      Cervical back: Normal range of motion and neck supple.      Right lower leg: Edema present.      Left lower leg: Edema present.   Lymphadenopathy:      Cervical: No cervical adenopathy.   Skin:     General: Skin is warm and dry.      Capillary Refill: Capillary refill takes less than 2 seconds.      Findings: Erythema present. No rash.   Neurological:      Mental Status: She is alert and oriented to person, place, and time.      Cranial Nerves: No cranial nerve deficit.      Sensory: No sensory deficit.      Coordination: Coordination normal.   Psychiatric:         Behavior: Behavior normal.         Thought Content: Thought content normal.         Judgment: Judgment normal.             Significant Labs: All pertinent labs within the past 24 hours have been reviewed.  CMP:   Recent Labs   Lab 05/13/24  0526 05/13/24  1155 05/14/24  0653    138 137   K 4.0 3.8 3.7    104 102   CO2 27 27 25   GLU 89 146* 91   BUN 32* 34* 35*   CREATININE 1.8* 1.9* 2.1*   CALCIUM 9.8 9.8 9.4   ANIONGAP 9 7* 10       Significant Imaging: I have reviewed all pertinent imaging results/findings within the past 24 hours.

## 2024-05-14 NOTE — ASSESSMENT & PLAN NOTE
RESOLVED  Patient has Abnormal Phosphorus: hypophosphatemia. Will continue to monitor electrolytes closely. Will replace the affected electrolytes and repeat labs to be done after interventions completed. The patient's phosphorus results have been reviewed and are listed below.  Recent Labs   Lab 05/14/24  0746   PHOS 3.3

## 2024-05-14 NOTE — ASSESSMENT & PLAN NOTE
- controlled on admit  - continue toprol 100 mg and losartan 50 mg daily   - holding home losartan  - IV diuresis as above  - patient with soft pressures  - patient asymptomatic, monitor closely

## 2024-05-14 NOTE — ASSESSMENT & PLAN NOTE
Patient with Paroxysmal (<7 days) atrial fibrillation which is controlled currently with Beta Blocker. Patient is currently in atrial fibrillation.[id number] Score: 3. Anticoagulation indicated. Anticoagulation done with eliquis 5 mg BID  .

## 2024-05-14 NOTE — PT/OT/SLP PROGRESS
Occupational Therapy      Patient Name:  Misa Barajas   MRN:  [id number]    Patient not seen today secondary to pt politely deferred OT evaluation with family present, stating she had already had PT this date and was fatigued from multiple meetings with her medical team today. Will follow-up 5/15, pt communicated willingness to engage in OT eval.    5/14/2024

## 2024-05-14 NOTE — ASSESSMENT & PLAN NOTE
Acute interstitial nephritis   - Cr 1.8 on admit, baseline 1.3  - Estimated Creatinine Clearance: 22.2 mL/min (A) (based on SCr of 2.1 mg/dL (H)).  - Likely 2/2 hypervolemia in setting of CHF exacerbation. Should improve with IV diuresis  - renally dose meds  - avoid nephrotoxic insults  - monitor I/Os  - monitor with daily BMP, replete lytes if necessary  - improved to 1.2 with diuresis  - new Cr bump of 1.9 on am labs, not improving s/p fluids or further diuresis  - urine lytes pending, repeat UA pending, urine urea pending, wrights stain pending    - FeNa calculated at 0.5%   -wrights stain with eosinophils   -discontinued all abx  - nephrology consulted for rise in Cr and possible AIN, appreciate recommendations:  - would hold on further diuresis in addition would no give IVFs at this time  - will follow-up UPCR and retroperitoneal US  - agree with stopping antibiotics especially double strength Bactrim  - RP US with chronic medical renal disease, no hydronephrosis, 3 mm nonobstructing right renal stone.

## 2024-05-14 NOTE — ASSESSMENT & PLAN NOTE
- UA infectious on admission   - Ucx currently growing staph aureus and enterococcus  - pending susceptibilities of culture  - switched IV rocephin to IV vanc  - bladder scan with 500mL, straight cath performed  - monitor patient for continued urinary retention  - switch to oral bactrim and augmentin  - concerns that abx could be causing MARQUEZ, transition to ampicillin and keflex  - 7d completed of abx, discontinued all due to AIN concerns

## 2024-05-14 NOTE — ASSESSMENT & PLAN NOTE
- patient with urinary retention on admission requiring straight cath   - q6hr bladder scans ordered   - flomax initiated   - mobilize patient as she has not been out of bed since admission   -  flomax initiated   - orosco placed due to continued urinary retention  - schedule FU with urology at discharge  - plan for voiding trial prior to discharge

## 2024-05-15 PROBLEM — N10 ACUTE INTERSTITIAL NEPHRITIS: Status: ACTIVE | Noted: 2024-05-15

## 2024-05-15 LAB
ANION GAP SERPL CALC-SCNC: 11 MMOL/L (ref 8–16)
BASOPHILS # BLD AUTO: 0.08 K/UL (ref 0–0.2)
BASOPHILS NFR BLD: 1.2 % (ref 0–1.9)
BUN SERPL-MCNC: 43 MG/DL (ref 8–23)
CALCIUM SERPL-MCNC: 9.4 MG/DL (ref 8.7–10.5)
CHLORIDE SERPL-SCNC: 101 MMOL/L (ref 95–110)
CO2 SERPL-SCNC: 24 MMOL/L (ref 23–29)
CREAT SERPL-MCNC: 2.6 MG/DL (ref 0.5–1.4)
CREAT UR-MCNC: 98 MG/DL (ref 15–325)
DIFFERENTIAL METHOD BLD: ABNORMAL
EOSINOPHIL # BLD AUTO: 0.3 K/UL (ref 0–0.5)
EOSINOPHIL NFR BLD: 4.4 % (ref 0–8)
ERYTHROCYTE [DISTWIDTH] IN BLOOD BY AUTOMATED COUNT: 13.3 % (ref 11.5–14.5)
EST. GFR  (NO RACE VARIABLE): 18.1 ML/MIN/1.73 M^2
GLUCOSE SERPL-MCNC: 76 MG/DL (ref 70–110)
HCT VFR BLD AUTO: 36.9 % (ref 37–48.5)
HGB BLD-MCNC: 11.8 G/DL (ref 12–16)
IMM GRANULOCYTES # BLD AUTO: 0.02 K/UL (ref 0–0.04)
IMM GRANULOCYTES NFR BLD AUTO: 0.3 % (ref 0–0.5)
LYMPHOCYTES # BLD AUTO: 1.4 K/UL (ref 1–4.8)
LYMPHOCYTES NFR BLD: 20.2 % (ref 18–48)
MAGNESIUM SERPL-MCNC: 2.1 MG/DL (ref 1.6–2.6)
MCH RBC QN AUTO: 30 PG (ref 27–31)
MCHC RBC AUTO-ENTMCNC: 32 G/DL (ref 32–36)
MCV RBC AUTO: 94 FL (ref 82–98)
MONOCYTES # BLD AUTO: 0.8 K/UL (ref 0.3–1)
MONOCYTES NFR BLD: 11.7 % (ref 4–15)
NEUTROPHILS # BLD AUTO: 4.2 K/UL (ref 1.8–7.7)
NEUTROPHILS NFR BLD: 62.2 % (ref 38–73)
NRBC BLD-RTO: 0 /100 WBC
PHOSPHATE SERPL-MCNC: 3.1 MG/DL (ref 2.7–4.5)
PLATELET # BLD AUTO: 213 K/UL (ref 150–450)
PMV BLD AUTO: 10.1 FL (ref 9.2–12.9)
POTASSIUM SERPL-SCNC: 3.9 MMOL/L (ref 3.5–5.1)
PROT UR-MCNC: 20 MG/DL (ref 0–15)
PROT/CREAT UR: 0.2 MG/G{CREAT} (ref 0–0.2)
RBC # BLD AUTO: 3.93 M/UL (ref 4–5.4)
SODIUM SERPL-SCNC: 136 MMOL/L (ref 136–145)
WBC # BLD AUTO: 6.82 K/UL (ref 3.9–12.7)

## 2024-05-15 PROCEDURE — 99900035 HC TECH TIME PER 15 MIN (STAT)

## 2024-05-15 PROCEDURE — 97530 THERAPEUTIC ACTIVITIES: CPT

## 2024-05-15 PROCEDURE — 97116 GAIT TRAINING THERAPY: CPT | Mod: CQ

## 2024-05-15 PROCEDURE — 97535 SELF CARE MNGMENT TRAINING: CPT

## 2024-05-15 PROCEDURE — 63600175 PHARM REV CODE 636 W HCPCS

## 2024-05-15 PROCEDURE — 99232 SBSQ HOSP IP/OBS MODERATE 35: CPT | Mod: ,,, | Performed by: INTERNAL MEDICINE

## 2024-05-15 PROCEDURE — 94640 AIRWAY INHALATION TREATMENT: CPT

## 2024-05-15 PROCEDURE — 97165 OT EVAL LOW COMPLEX 30 MIN: CPT

## 2024-05-15 PROCEDURE — 25000003 PHARM REV CODE 250

## 2024-05-15 PROCEDURE — 94761 N-INVAS EAR/PLS OXIMETRY MLT: CPT

## 2024-05-15 PROCEDURE — 84100 ASSAY OF PHOSPHORUS: CPT

## 2024-05-15 PROCEDURE — 82570 ASSAY OF URINE CREATININE: CPT

## 2024-05-15 PROCEDURE — 36415 COLL VENOUS BLD VENIPUNCTURE: CPT

## 2024-05-15 PROCEDURE — 25000003 PHARM REV CODE 250: Performed by: PHYSICIAN ASSISTANT

## 2024-05-15 PROCEDURE — 80048 BASIC METABOLIC PNL TOTAL CA: CPT

## 2024-05-15 PROCEDURE — 27000221 HC OXYGEN, UP TO 24 HOURS

## 2024-05-15 PROCEDURE — 85025 COMPLETE CBC W/AUTO DIFF WBC: CPT

## 2024-05-15 PROCEDURE — 20600001 HC STEP DOWN PRIVATE ROOM

## 2024-05-15 PROCEDURE — 83735 ASSAY OF MAGNESIUM: CPT

## 2024-05-15 PROCEDURE — 97530 THERAPEUTIC ACTIVITIES: CPT | Mod: CQ

## 2024-05-15 RX ORDER — SODIUM CHLORIDE, SODIUM LACTATE, POTASSIUM CHLORIDE, CALCIUM CHLORIDE 600; 310; 30; 20 MG/100ML; MG/100ML; MG/100ML; MG/100ML
INJECTION, SOLUTION INTRAVENOUS CONTINUOUS
Status: ACTIVE | OUTPATIENT
Start: 2024-05-15 | End: 2024-05-15

## 2024-05-15 RX ADMIN — MICONAZOLE NITRATE 2 % TOPICAL POWDER: at 09:05

## 2024-05-15 RX ADMIN — POLYETHYLENE GLYCOL 3350 17 G: 17 POWDER, FOR SOLUTION ORAL at 09:05

## 2024-05-15 RX ADMIN — ATORVASTATIN CALCIUM 20 MG: 20 TABLET, FILM COATED ORAL at 09:05

## 2024-05-15 RX ADMIN — APIXABAN 2.5 MG: 2.5 TABLET, FILM COATED ORAL at 09:05

## 2024-05-15 RX ADMIN — METOPROLOL SUCCINATE 100 MG: 100 TABLET, EXTENDED RELEASE ORAL at 09:05

## 2024-05-15 RX ADMIN — SODIUM CHLORIDE, POTASSIUM CHLORIDE, SODIUM LACTATE AND CALCIUM CHLORIDE: 600; 310; 30; 20 INJECTION, SOLUTION INTRAVENOUS at 12:05

## 2024-05-15 RX ADMIN — TAMSULOSIN HYDROCHLORIDE 0.4 MG: 0.4 CAPSULE ORAL at 09:05

## 2024-05-15 RX ADMIN — FLUTICASONE FUROATE AND VILANTEROL TRIFENATATE 1 PUFF: 100; 25 POWDER RESPIRATORY (INHALATION) at 09:05

## 2024-05-15 RX ADMIN — ASPIRIN 81 MG: 81 TABLET, COATED ORAL at 09:05

## 2024-05-15 NOTE — SUBJECTIVE & OBJECTIVE
Interval History: Patient seen and assessed at bedside. Afebrile, VSS on home 2L NC. Cr bump from 2.1 > 2.6 on am labs. RP US performed this morning. Nephrology following for further recommendations. Patient still with REED, but no other complaints at this time.       Review of Systems   Constitutional:  Negative for activity change, chills and fever.   HENT:  Negative for trouble swallowing.    Eyes:  Negative for photophobia and visual disturbance.   Respiratory:  Negative for cough, chest tightness and shortness of breath (improved).         +REED   Cardiovascular:  Positive for leg swelling. Negative for chest pain and palpitations.   Gastrointestinal:  Negative for abdominal pain, constipation, diarrhea, nausea and vomiting.   Genitourinary:  Negative for dysuria, frequency and hematuria.   Musculoskeletal:  Negative for back pain, gait problem and neck pain.   Skin:  Positive for color change. Negative for rash and wound.   Neurological:  Negative for dizziness, syncope, speech difficulty and light-headedness.   Psychiatric/Behavioral:  Negative for agitation and confusion. The patient is not nervous/anxious.      Objective:     Vital Signs (Most Recent):  Temp: 97.5 °F (36.4 °C) (05/15/24 0752)  Pulse: 95 (05/15/24 0941)  Resp: 18 (05/15/24 0941)  BP: (!) 122/59 (05/15/24 0752)  SpO2: (!) 94 % (05/15/24 0752) Vital Signs (24h Range):  Temp:  [97.5 °F (36.4 °C)-98.3 °F (36.8 °C)] 97.5 °F (36.4 °C)  Pulse:  [] 95  Resp:  [17-20] 18  SpO2:  [93 %-96 %] 94 %  BP: ()/(57-69) 122/59     Weight: 79.8 kg (175 lb 14.8 oz)  Body mass index is 30.2 kg/m².    Intake/Output Summary (Last 24 hours) at 5/15/2024 1128  Last data filed at 5/15/2024 0538  Gross per 24 hour   Intake 240 ml   Output 400 ml   Net -160 ml         Physical Exam  Vitals and nursing note reviewed.   Constitutional:       General: She is not in acute distress.     Appearance: She is well-developed.   HENT:      Head: Normocephalic and  atraumatic.      Mouth/Throat:      Pharynx: No oropharyngeal exudate.   Eyes:      General: No scleral icterus.     Conjunctiva/sclera: Conjunctivae normal.   Cardiovascular:      Rate and Rhythm: Normal rate and regular rhythm.      Heart sounds: Normal heart sounds.   Pulmonary:      Effort: Pulmonary effort is normal. No respiratory distress.      Breath sounds: Normal breath sounds. No wheezing or rales.      Comments: NC in place  Abdominal:      General: Bowel sounds are normal. There is no distension.      Palpations: Abdomen is soft.      Tenderness: There is no abdominal tenderness.   Genitourinary:     Comments: Muniz in place  Musculoskeletal:         General: No tenderness. Normal range of motion.      Cervical back: Normal range of motion and neck supple.      Right lower leg: Edema present.      Left lower leg: Edema present.   Lymphadenopathy:      Cervical: No cervical adenopathy.   Skin:     General: Skin is warm and dry.      Capillary Refill: Capillary refill takes less than 2 seconds.      Findings: Erythema and rash present.   Neurological:      Mental Status: She is alert and oriented to person, place, and time.      Cranial Nerves: No cranial nerve deficit.      Sensory: No sensory deficit.      Coordination: Coordination normal.   Psychiatric:         Behavior: Behavior normal.         Thought Content: Thought content normal.         Judgment: Judgment normal.             Significant Labs: All pertinent labs within the past 24 hours have been reviewed.  CMP:   Recent Labs   Lab 05/13/24  1155 05/14/24  0653 05/15/24  0510    137 136   K 3.8 3.7 3.9    102 101   CO2 27 25 24   * 91 76   BUN 34* 35* 43*   CREATININE 1.9* 2.1* 2.6*   CALCIUM 9.8 9.4 9.4   ANIONGAP 7* 10 11       Significant Imaging: I have reviewed all pertinent imaging results/findings within the past 24 hours.

## 2024-05-15 NOTE — PROGRESS NOTES
"[facility]  Adult Nutrition  Progress Note    SUMMARY       Recommendations    1. Continue Low Na diet      2. If PO intake <50%, add Novasource renal BID      3. RD to monitor and follow    Goals: Meet % EEN, EPN by RD f/u  Nutrition Goal Status: new  Communication of RD Recs:  (POC)    Assessment and Plan    Nutrition Problem  Increased energy needs     Related to (etiology):   Increased demand for nutrient due to prolonged catabolic illness    Signs and Symptoms (as evidenced by):   MARQUEZ     Interventions/Recommendations (treatment strategy):  Collaboration with other providers    Nutrition Diagnosis Status:   New     Reason for Assessment    Reason For Assessment: RD follow-up  Diagnosis:  (Acute renal failure superimposed on stage 3b chronic kidney disease)  Relevant Medical History: HTN, HLD, chronic respiratory failure with hypoxia, CHF, RLE wound  Interdisciplinary Rounds: did not attend    General Information Comments:   RD f/u. Pt reports good appetite, tolerating ~ 75% of meals. Denies nausea, vomiting, diarrhea, constipation, chewing/swallowing difficulties.  lb per wt hx. Wt stable. Pt appears nourished. No indicator of malnutrition.     Nutrition Discharge Planning: Educated on fluid and salt restriction on 5/8.     Nutrition Risk Screen    Nutrition Risk Screen: no indicators present    Nutrition/Diet History    Spiritual, Cultural Beliefs, Hoahaoism Practices, Values that Affect Care: no    Anthropometrics    Temp: 97.8 °F (36.6 °C)  Height: 5' 4" (162.6 cm)  Height (inches): 64 in  Weight Method: Bed Scale  Weight: 79.8 kg (175 lb 14.8 oz)  Weight (lb): 175.93 lb  Ideal Body Weight (IBW), Female: 120 lb  % Ideal Body Weight, Female (lb): 152.48 %  BMI (Calculated): 30.2       Lab/Procedures/Meds    Pertinent Labs Reviewed: reviewed  Pertinent Labs Comments: Urine protein 20, BUN 43, Cr 2.6, eGFR 18.1  Pertinent Medications Reviewed: reviewed  Pertinent Medications " Comments: .   apixaban  2.5 mg Oral BID    aspirin  81 mg Oral Daily    atorvastatin  20 mg Oral Daily    fluticasone furoate-vilanteroL  1 puff Inhalation Daily    metoprolol succinate  100 mg Oral Daily    miconazole NITRATE 2 %   Topical (Top) BID    polyethylene glycol  17 g Oral Daily    potassium bicarbonate  20 mEq Oral Once    tamsulosin  0.4 mg Oral Daily      lactated ringers   Intravenous Continuous           Estimated/Assessed Needs    Weight Used For Calorie Calculations: 79.8 kg (175 lb 14.8 oz)  Energy Calorie Requirements (kcal): 1503 kcal  Energy Need Method: [place]-St Jeor (PAL 1.2)  Protein Requirements: 65-82g (1.2-1.5g/kg IBW)  Weight Used For Protein Calculations: 54.4 kg (120 lb) (IBW)  Fluid Requirements (mL): 1ml/kcal or per MD  Estimated Fluid Requirement Method: RDA Method  RDA Method (mL): 1503     Nutrition Prescription Ordered    Current Diet Order: Low Na    Evaluation of Received Nutrient/Fluid Intake    I/O: - 7.2 L since admit  Energy Calories Required: meeting needs  Protein Required: meeting needs  Comments: LBM 5/14  Tolerance: tolerating  % Intake of Estimated Energy Needs: 75 - 100 %  % Meal Intake: 50 - 75 %    Nutrition Risk    Level of Risk/Frequency of Follow-up:  (1x/week)     Monitor and Evaluation    Food and Nutrient Intake: energy intake, food and beverage intake  Food and Nutrient Adminstration: diet order  Knowledge/Beliefs/Attitudes: food and nutrition knowledge/skill  Physical Activity and Function: nutrition-related ADLs and IADLs  Anthropometric Measurements: height/length, body mass index, weight, weight change  Biochemical Data, Medical Tests and Procedures: electrolyte and renal panel, gastrointestinal profile, glucose/endocrine profile, inflammatory profile, lipid profile  Nutrition-Focused Physical Findings: overall appearance     Nutrition Follow-Up    RD Follow-up?: Yes       DISPLAY PLAN FREE TEXT

## 2024-05-15 NOTE — PT/OT/SLP PROGRESS
Physical Therapy Treatment    Patient Name:  Misa Barajas   MRN:  [id number]    Recommendations:     Discharge Recommendations: Low Intensity Therapy  Discharge Equipment Recommendations: walker, rolling  The mobility limitation cannot be sufficiently resolved by the use of a cane. Patient's functional mobility deficit can be sufficiently resolved with the use of a Rolling Walker. Patient's mobility limitation significantly impairs their ability to participate in one of more activities of daily living. The use of a Rolling Walker will significantly improve the patient's ability to participate in MRADLS and the patient will use it on regular basis in the home.     Barriers to discharge: Increased level of assist, Decreased caregiver support, and Ongoing medical treatment     Assessment:     Misa Barajas is a 78 y.o. female admitted with a medical diagnosis of Acute renal failure superimposed on stage 3b chronic kidney disease.  She presents with the following impairments/functional limitations: weakness, impaired endurance, impaired self care skills, impaired functional mobility, gait instability, impaired balance, decreased lower extremity function, decreased safety awareness, pain, decreased ROM, impaired skin, impaired cardiopulmonary response to activity .Pt  tolerated treatment fairly well and is progressing slowly with mobility. pt limited due to fatigue. Patient remains appropriate for continued skilled services within the acute environment and goals remain appropriate.        Rehab Prognosis: Good; patient would benefit from acute skilled PT services to address these deficits and reach maximum level of function.    Recent Surgery: * No surgery found *      Plan:     During this hospitalization, patient to be seen 3 x/week to address the identified rehab impairments via gait training, therapeutic activities, therapeutic exercises, neuromuscular re-education and progress toward the following goals:    Plan  of Care Expires:  05/25/24    Subjective     Chief Complaint:fatigue  Patient/Family Comments/goals: I went to an U/S earlier this morning  Pain/Comfort:  Pain Rating 1:  (not rated)      Objective:     Communicated with RN prior to session.  Patient found up in chair with telemetry, oxygen upon PT entry to room.     General Precautions: Standard, fall  Orthopedic Precautions: N/A  Braces: N/A  Respiratory Status: Nasal cannula, flow 2 L/min     Functional Mobility:  Bed Mobility:     Scooting: contact guard assistance   Sit to suipne: min assistance  Transfers:     Sit to Stand:  minimum assistance with rolling walker  Gait: Patient ambulated 35 ft with rolling walker and minimum assistance. Patient demonstrates occasional unsteady gait, decreased step length, decreased weight shift, ambulates outside VITOR of RW, flexed posture, decreased faith, and antalgic gait. All lines remained intact throughout ambulation trail.      AM-PAC 6 CLICK MOBILITY  Turning over in bed (including adjusting bedclothes, sheets and blankets)?: 3  Sitting down on and standing up from a chair with arms (e.g., wheelchair, bedside commode, etc.): 3  Moving from lying on back to sitting on the side of the bed?: 3  Moving to and from a bed to a chair (including a wheelchair)?: 3  Need to walk in hospital room?: 3  Climbing 3-5 steps with a railing?: 2  Basic Mobility Total Score: 17       Treatment & Education:  Therapist provided instruction and educated of  patient on progress, safety,d/c,PT POC, proper body mechanics, energy conservation, and fall prevention strategies during tasks listed above, on the effects of prolonged immobility and the importance of performing OOB activity and exercises to promote healing and reduce recovery time     Patient  facilitated therex  seated in bedside chair B LE AROM AP, LAQ, Hip Flexion, Hip Abd/Add. Patient required skilled PTA for instruction of exercises and appropriate cues to perform exercises  safely, sequencing and appropriately.   Exercises performed to develop and maintain pt's strength, endurance and flexibility.  Updated white board with appropriate PT mobility information for medical team notification   Donned an extra gown   Call nursing/pct to transfer to chair/use bathroom. Pt stated understanding  Bedside table in front of patient and area set up for function, convenience, and safety. RN aware of patient's mobility needs and status. Questions/concerns addressed within PTA scope of practice; patient  with no further questions. Time was provided for active listening, discussion of health disposition, and discussion of safe discharge. Pt?verbalized?agreement .  Pt issued and instructed in HEP  Patient left HOB elevated with all lines intact, call button in reach, and nsg notified..    GOALS:   Multidisciplinary Problems       Physical Therapy Goals          Problem: Physical Therapy    Goal Priority Disciplines Outcome Goal Variances Interventions   Physical Therapy Goal     PT, PT/OT Progressing     Description: Goals to be met by: 2024     Patient will increase functional independence with mobility by performin. Sit to stand transfer with Stand-by Assistance. -In progress  2. Bed to chair transfer with Stand-by Assistance using Rolling Walker. -In progress  3. Gait x 25 feet with Stand-by Assistance using Rolling Walker. -In progress   4. Lower extremity exercise program x20 reps per handout, with supervision. -In progress                       Time Tracking:     PT Received On: 05/15/24  PT Start Time: 925     PT Stop Time:   PT Total Time (min): 46 min     Billable Minutes: Gait Training 15 and Therapeutic Activity 25    Treatment Type: Treatment  PT/PTA: PTA     Number of PTA visits since last PT visit: 2     05/15/2024

## 2024-05-15 NOTE — PLAN OF CARE
Problem: Pulmonary Impairment  Goal: Improved Activity Tolerance  Outcome: Progressing     Problem: Urinary Retention  Goal: Effective Urinary Elimination  Outcome: Progressing

## 2024-05-15 NOTE — PROGRESS NOTES
[facility]  Nephrology  Progress Note    Patient Name: Misa Barajas  MRN: [id number]  Admission Date: 5/7/2024  Hospital Length of Stay: 7 days  Attending Provider: Fiorella Cobian MD   Primary Care Physician: Celia Carlisle MD  Principal Problem:Acute renal failure superimposed on stage 3b chronic kidney disease    Subjective:     HPI: Ms Barajas is an obese (BMI ~31) 78-year-old woman with HFpEF (most recent TTE with EF 55% with G2DD), mild to moderate tricuspid regurgitation, hypertension, HLD, paroxsymal atrial fibrillation on Eliquis, COPD on supplemental oxygen at home with 2 liters via nasal cannula, atherosclerotic disease, chronic lymphedema, history of left sided breast cancer, CKD IIIb (baseline creatinine ~1.5) as well as several other co-morbid conditions who [place] [facility] [place] acute on chronic CHF exacerbation, MARQUEZ with creatinine 1.8, UTI growing S. aureus & Enterococcus faecalis and concern for right lower extremity cellulitis. She was management in the usually fashion with IV diuresis with Lasix in addition to receiving several different antibiotics including ampicillin, Augmentin, Rocephin, cephalexin, Bactrim and vancomycin. Her admission has been complicated by urinary retention necessitating Muniz catheter placement, hypotension (lowest document BP 87/54 mmHg) and worsening renal function for which Nephrology has been consulted. Creatinine trend this admission is as follows 1.8 > 1.5 > 1.2 > 1.3 > 1.9 > 1.8 > 1.9 > 2.1. Mcgarry stain was noted to be positive for occasional eosinophils.  Urinary sediment was assessed which demonstrated innumerable WBCs with some within waxy casts, non dysmorphic RBCs, waxy casts and occasional bacteria.     Interval History: Patient seen and examined. No acute events overnight. Afebrile with pulse ranging from 100-80s bpm. Systolic blood pressures ranging from 110-90s mmHg. She is saturating +93% on 2 liters via nasal cannula.  Documented UOP of 400 mL in the last 24 hours. Creatinine now 2.6 from 2.1.     Review of patient's allergies indicates:   Allergen Reactions    Adhesive Rash     Pt states she removed a LATEX bandaid and the skin beneath was swollen and red. No other latex causes a reaction.     Current Facility-Administered Medications   Medication Frequency    acetaminophen tablet 1,000 mg Q8H PRN    acetaminophen tablet 650 mg Q4H PRN    albuterol-ipratropium 2.5 mg-0.5 mg/3 mL nebulizer solution 3 mL Q4H PRN    apixaban tablet 2.5 mg BID    aspirin EC tablet 81 mg Daily    atorvastatin tablet 20 mg Daily    bisacodyL suppository 10 mg Daily PRN    dextrose 10% bolus 125 mL 125 mL PRN    dextrose 10% bolus 250 mL 250 mL PRN    fluticasone furoate-vilanteroL 100-25 mcg/dose diskus inhaler 1 puff Daily    glucagon (human recombinant) injection 1 mg PRN    glucose chewable tablet 16 g PRN    glucose chewable tablet 24 g PRN    melatonin tablet 6 mg Nightly PRN    methocarbamoL tablet 500 mg QID PRN    metoprolol succinate (TOPROL-XL) 24 hr tablet 100 mg Daily    miconazole NITRATE 2 % top powder BID    ondansetron disintegrating tablet 8 mg Q8H PRN    polyethylene glycol packet 17 g Daily    potassium bicarbonate disintegrating tablet 20 mEq Once    promethazine tablet 25 mg Q6H PRN    sodium chloride 0.9% flush 10 mL PRN    tamsulosin 24 hr capsule 0.4 mg Daily       Objective:     Vital Signs (Most Recent):  Temp: 97.7 °F (36.5 °C) (05/15/24 0423)  Pulse: 93 (05/15/24 0423)  Resp: 17 (05/15/24 0423)  BP: 114/61 (05/15/24 0423)  SpO2: (!) 93 % (05/15/24 0423) Vital Signs (24h Range):  Temp:  [97.5 °F (36.4 °C)-98.3 °F (36.8 °C)] 97.7 °F (36.5 °C)  Pulse:  [] 93  Resp:  [16-20] 17  SpO2:  [93 %-96 %] 93 %  BP: ()/(57-69) 114/61     Weight: 82.6 kg (182 lb 1.6 oz) (05/15/24 0400)  Body mass index is 31.26 kg/m².  Body surface area is 1.93 meters squared.    I/O last 3 completed shifts:  In: -   Out: 1950 [Urine:1950]      Physical Exam  Vitals and nursing note reviewed.   Constitutional:       General: She is awake. She is not in acute distress.     Appearance: She is well-developed. She is obese. She is ill-appearing. She is not diaphoretic.      Interventions: Nasal cannula in place.   HENT:      Head: Normocephalic and atraumatic.      Right Ear: External ear normal.      Left Ear: External ear normal.      Nose:      Comments: Nasal cannula in place.     Mouth/Throat:      Mouth: Mucous membranes are moist.      Pharynx: Oropharynx is clear. No oropharyngeal exudate or posterior oropharyngeal erythema.   Eyes:      General: No scleral icterus.        Right eye: No discharge.         Left eye: No discharge.      Extraocular Movements: Extraocular movements intact.      Conjunctiva/sclera: Conjunctivae normal.   Cardiovascular:      Rate and Rhythm: Normal rate.      Heart sounds: Murmur heard.   Pulmonary:      Effort: Pulmonary effort is normal. No respiratory distress.      Breath sounds: No wheezing, rhonchi or rales.   Abdominal:      General: Bowel sounds are normal. There is no distension.      Palpations: Abdomen is soft.      Tenderness: There is no abdominal tenderness.   Genitourinary:     Comments: Muniz catheter in place.  Musculoskeletal:      Cervical back: Neck supple.      Right lower leg: Edema present.      Left lower leg: Edema present.   Skin:     General: Skin is warm and dry.      Coloration: Skin is not jaundiced.      Findings: Bruising, erythema and rash present.   Neurological:      General: No focal deficit present.      Mental Status: She is alert. Mental status is at baseline.      Cranial Nerves: No cranial nerve deficit.      Motor: No weakness.   Psychiatric:         Mood and Affect: Mood normal.         Behavior: Behavior normal. Behavior is cooperative.          Significant Labs:  BMP:   Recent Labs   Lab 05/14/24  0653 05/14/24  0746   GLU 91  --      --    K 3.7  --      --    CO2  25  --    BUN 35*  --    CREATININE 2.1*  --    CALCIUM 9.4  --    MG  --  2.1     CBC:   Recent Labs   Lab 05/12/24  0758   WBC 8.45   RBC 3.88*   HGB 11.7*   HCT 37.6      MCV 97   MCH 30.2   MCHC 31.1*     CMP:   Recent Labs   Lab 05/14/24  0653   GLU 91   CALCIUM 9.4      K 3.7   CO2 25      BUN 35*   CREATININE 2.1*     Microbiology Results (last 7 days)       Procedure Component Value Units Date/Time    Blood culture [[id number]] Collected: 05/09/24 1433    Order Status: Completed Specimen: Blood Updated: 05/14/24 1612     Blood Culture, Routine No growth after 5 days.    Blood culture [[id number]] Collected: 05/09/24 1434    Order Status: Completed Specimen: Blood Updated: 05/14/24 1612     Blood Culture, Routine No growth after 5 days.    Blood culture [[id number]] Collected: 05/07/24 2251    Order Status: Completed Specimen: Blood from Peripheral, Forearm, Right Updated: 05/13/24 0612     Blood Culture, Routine No growth after 5 days.    Blood culture [[id number]]  (Abnormal) Collected: 05/07/24 2251    Order Status: Completed Specimen: Blood from Peripheral, Hand, Right Updated: 05/11/24 1057     Blood Culture, Routine Gram stain aer bottle: Gram positive cocci in clusters resembling Staph      Results called to and read back by:Josephine Minor LPN 05/09/2024  13:59      MICROCOCCUS SPECIES  Organism is a probable contaminant      Urine culture [[id number]]  (Abnormal)  (Susceptibility) Collected: 05/08/24 0242    Order Status: Completed Specimen: Urine Updated: 05/10/24 1241     Urine Culture, Routine STAPHYLOCOCCUS AUREUS  > 100,000 cfu/ml        ENTEROCOCCUS FAECALIS  50,000 - 99,999 cfu/ml      Narrative:      Specimen Source->Urine    MRSA/SA Rapid ID by PCR from Blood culture [[id number]] Collected: 05/09/24 1359    Order Status: Completed Updated: 05/09/24 2127     Staph aureus ID by PCR Negative     Methicillin Resistant ID by PCR Negative          Specimen (24h ago, onward)       None          Significant Imaging:  I have reviewed all imagining in the last 24 hours.  Assessment/Plan:     Cardiac/Vascular  Essential hypertension  - management per primary team  - currently on Toprol- mg daily    Renal/  * Acute renal failure superimposed on stage 3b chronic kidney disease  Urinary retention  78-year-old woman with HFpEF (most recent TTE with EF 55% with G2DD), mild to moderate tricuspid regurgitation, HTN, paroxsymal atrial fibrillation on Eliquis, COPD on 2 liters O2, and CKD IIIb (baseline creatinine ~1.5) admitted to [facility] [place] on 5/7 with acute on chronic CHF exacerbation, MARQUEZ with creatinine 1.8, UTI growing S. aureus & Enterococcus faecalis and concern for right lower extremity cellulitis. She was management in the usually fashion with IV diuresis with Lasix in addition to receiving several different antibiotics including ampicillin, Augmentin, Rocephin, cephalexin, DS Bactrim and vancomycin. Her admission has been complicated by urinary retention necessitating Muniz catheter placement, hypotension (lowest document BP 87/54 mmHg) and worsening renal function for which Nephrology has been consulted. Creatinine trend this admission is as follows 1.8 > 1.5 > 1.2 > 1.3 > 1.9 > 1.8 > 1.9 > 2.1. Mcgarry stain was noted to be positive for occasional eosinophils.  Urinary sediment was assessed which demonstrated innumerable WBCs with some within waxy casts, non dysmorphic RBCs, waxy casts and occasional bacteria. UPCR only 0.2 and retroperitoneal US with changes consistent with chronic medical renal disease, no hydronephrosis but 3 mm nonobstructing right renal stone was noted.    Plan/Recommendations:  - etiology of MARQUEZ not clear, could be pre-renal in the setting of diuresis versus AIN with multiple classes of antibiotics given (including DS Bactrim with trimethoprim being known to inhibit creatinine secretion at organic cation transporters within the proximal tubule)  - recommend trial of LR  at 100 mL/hr and reassess UOP and renal function   - agree with stopping antibiotics for now  - would keep Muniz in place in light of urinary retention  - monitor renal function and UOP over next 12 hours and reassess   - daily RFP and magnesium levels  - renal diet/tube feeds when not NPO with volume restriction per primary team  - strict I/O's and daily weights  - renally all dose medications to eGFR   - avoid nephrotoxic agents wean feasible (i.e. NSAIDs, intra-arterial contrast, supra-therapeutic vancomycin levels, etc.)  - no acute indications for RRT at this time however will continue to monitor closely    Thank you for your consult. I will follow-up with patient. Please contact us if you have any additional questions.    Cal Iniguez MD  Nephrology  [facility]

## 2024-05-15 NOTE — ASSESSMENT & PLAN NOTE
RESOLVED  Patient has Abnormal Phosphorus: hypophosphatemia. Will continue to monitor electrolytes closely. Will replace the affected electrolytes and repeat labs to be done after interventions completed. The patient's phosphorus results have been reviewed and are listed below.  Recent Labs   Lab 05/15/24  0510   PHOS 3.1

## 2024-05-15 NOTE — ASSESSMENT & PLAN NOTE
- last echo with EF 60-65%, GII LV DD  - BNP significantly elevated to 1165  - appears overloaded on exam with BLE edema and increased weight gain over the past few days despite increasing her home lasix from daily to BID  - CXR consistent with pulmonary edema and small bilateral pleural effusions   - continue BB  - Monitor on telemetry.   - Patient is on CHF pathway.  Monitor strict Is&Os and daily weights.  Place on fluid restriction of 1.5 L.   - start IV diuresis with lasix 40 mg BID, increased to 80mg IV BID  - goal UOP of -2L per day   - diuresis held originally for concerns of over diuresis with Cr bump  - POCUS performed by attending found patient to be fluid overloaded  - initiate 120mg IV lasix BID - holding for now  - repeat echo noted below    Results for orders placed during the hospital encounter of 05/07/24    Echo    Interpretation Summary    Left Ventricle: The left ventricle is normal in size. Ventricular mass is normal. Normal wall thickness. Normal wall motion. There is normal systolic function. Ejection fraction by visual approximation is 55%. Unable to assess diastolic function due to atrial fibrillation.    Right Ventricle: Normal right ventricular cavity size. Wall thickness is normal. Systolic function is mildly reduced.    Aortic Valve: The aortic valve is a trileaflet valve. There is moderate aortic valve sclerosis. There is annular calcification present. Mildly restricted motion. No stenosis.    Tricuspid Valve: There is mild to moderate regurgitation.    Pulmonary Artery: The estimated pulmonary artery systolic pressure is 44 mmHg.    IVC/SVC: Elevated venous pressure at 15 mmHg.

## 2024-05-15 NOTE — PLAN OF CARE
Problem: Occupational Therapy  Goal: Occupational Therapy Goal  Description: Goals to be met by: 6/14/24     Patient will increase functional independence with ADLs by performing:    UE Dressing with [place].  LE Dressing with [place].  Grooming while standing at sink with Set-up Assistance and Supervision.  Toileting from toilet with Modified [place] for hygiene and clothing management.   All functional transfers performed c/ Supervision    Outcome: Progressing

## 2024-05-15 NOTE — PLAN OF CARE
Recommendations    1. Continue Low Na diet      2. If PO intake <50%, add Novasource renal BID      3. RD to monitor and follow    Goals: Meet % EEN, EPN by RD f/u  Nutrition Goal Status: new  Communication of RD Recs:  (POC)

## 2024-05-15 NOTE — PROGRESS NOTES
[facility] [address] [place] [address] Medicine  Progress Note    Patient Name: Misa Barajas  MRN: [id number]  Patient Class: IP- Inpatient   Admission Date: 5/7/2024  Length of Stay: 7 days  Attending Physician: Fiorella Cobian MD  Primary Care Provider: Celia Carlisle MD        Subjective:     Principal Problem:Acute renal failure superimposed on stage 3b chronic kidney disease        HPI:  Misa Barajas is a 78 y.o. female with a PMHx of HTN, HLD, HFpEF, COPD on home O2 (2L NC baseline O2 sat 92%) and pAF presentswith increasing LE swelling.  She is gained 5 lb in the past 3 days despite increased Lasix use. She also endorses new redness and warmth with associated itching to her RLE. She has small pustules that she noticed to RLE a few days ago.  Denies fever, chills, chest pain, cough, SOB, abdominal pain, n/v/d, dysuria, headaches. Has chronic discoloration to BLEs without recent worsening.     ED: Afebrile . Hr 90s. Satting well on home 2L NC. CBC without leukocytosis or anemia. CMP notable for small MARQUEZ with Cr 1.8 (baseline 1.3) and BUN 26. BNP 1165. Trop 0.032 (0.028 at baseline). Lactic 1.2. CXR with Cardiomegaly with pulmonary vascular congestion and small bilateral pleural effusions, suggestive of pulmonary edema secondary to CHF. Given rocephin, toprol 100 mg and IV lasix 60 mg. Placed in observation for CHF exacerbation.     Overview/Hospital Course:  Misa Barajas is a 78 y.o. F who was admitted to  for further evaluation of acute on chronic diastolic CHF. Diuresing on IV lasix. Echo with EF 55%, unable to assess diastolic function due to a-fib, CVP 15mmHg. Cr 1.8 on admission most likely 2/2 to fluid overloaded, improved with diuresis. UA infectious, culture with staph aureus, enterococcus faecalis. IV rocephin switched to IV vancomycin. Transitioned to oral abx. Patient with urinary retention requiring straight cath. Initiated flomax and bladder scan q6hr. Continued retention - orosco  cath placed prior to discharge. Will set up urology follow-up OP. Cr bump of 1.9 after improvement with diuresis, originally thought to be due to possibly over-diuresis or abx. Unimproved with IVF. Mcgarry's stain with eosinophils, concerning for possible AIN - discontinued all abx. Nephrology consult placed for further recommendations. RP US with chronic medical renal disease, no hydronephrosis, 3mm non obstructing renal stone.     Interval History: Patient seen and assessed at bedside. Afebrile, VSS on home 2L NC. Cr bump from 2.1 > 2.6 on am labs. RP US performed this morning. Nephrology following for further recommendations. Patient still with REED, but no other complaints at this time.       Review of Systems   Constitutional:  Negative for activity change, chills and fever.   HENT:  Negative for trouble swallowing.    Eyes:  Negative for photophobia and visual disturbance.   Respiratory:  Negative for cough, chest tightness and shortness of breath (improved).         +REED   Cardiovascular:  Positive for leg swelling. Negative for chest pain and palpitations.   Gastrointestinal:  Negative for abdominal pain, constipation, diarrhea, nausea and vomiting.   Genitourinary:  Negative for dysuria, frequency and hematuria.   Musculoskeletal:  Negative for back pain, gait problem and neck pain.   Skin:  Positive for color change. Negative for rash and wound.   Neurological:  Negative for dizziness, syncope, speech difficulty and light-headedness.   Psychiatric/Behavioral:  Negative for agitation and confusion. The patient is not nervous/anxious.      Objective:     Vital Signs (Most Recent):  Temp: 97.5 °F (36.4 °C) (05/15/24 0752)  Pulse: 95 (05/15/24 0941)  Resp: 18 (05/15/24 0941)  BP: (!) 122/59 (05/15/24 0752)  SpO2: (!) 94 % (05/15/24 0752) Vital Signs (24h Range):  Temp:  [97.5 °F (36.4 °C)-98.3 °F (36.8 °C)] 97.5 °F (36.4 °C)  Pulse:  [] 95  Resp:  [17-20] 18  SpO2:  [93 %-96 %] 94 %  BP: ()/(57-69)  122/59     Weight: 79.8 kg (175 lb 14.8 oz)  Body mass index is 30.2 kg/m².    Intake/Output Summary (Last 24 hours) at 5/15/2024 1128  Last data filed at 5/15/2024 0538  Gross per 24 hour   Intake 240 ml   Output 400 ml   Net -160 ml         Physical Exam  Vitals and nursing note reviewed.   Constitutional:       General: She is not in acute distress.     Appearance: She is well-developed.   HENT:      Head: Normocephalic and atraumatic.      Mouth/Throat:      Pharynx: No oropharyngeal exudate.   Eyes:      General: No scleral icterus.     Conjunctiva/sclera: Conjunctivae normal.   Cardiovascular:      Rate and Rhythm: Normal rate and regular rhythm.      Heart sounds: Normal heart sounds.   Pulmonary:      Effort: Pulmonary effort is normal. No respiratory distress.      Breath sounds: Normal breath sounds. No wheezing or rales.      Comments: NC in place  Abdominal:      General: Bowel sounds are normal. There is no distension.      Palpations: Abdomen is soft.      Tenderness: There is no abdominal tenderness.   Genitourinary:     Comments: Muniz in place  Musculoskeletal:         General: No tenderness. Normal range of motion.      Cervical back: Normal range of motion and neck supple.      Right lower leg: Edema present.      Left lower leg: Edema present.   Lymphadenopathy:      Cervical: No cervical adenopathy.   Skin:     General: Skin is warm and dry.      Capillary Refill: Capillary refill takes less than 2 seconds.      Findings: Erythema and rash present.   Neurological:      Mental Status: She is alert and oriented to person, place, and time.      Cranial Nerves: No cranial nerve deficit.      Sensory: No sensory deficit.      Coordination: Coordination normal.   Psychiatric:         Behavior: Behavior normal.         Thought Content: Thought content normal.         Judgment: Judgment normal.             Significant Labs: All pertinent labs within the past 24 hours have been reviewed.  CMP:   Recent  Labs   Lab 05/13/24  1155 05/14/24  0653 05/15/24  0510    137 136   K 3.8 3.7 3.9    102 101   CO2 27 25 24   * 91 76   BUN 34* 35* 43*   CREATININE 1.9* 2.1* 2.6*   CALCIUM 9.8 9.4 9.4   ANIONGAP 7* 10 11       Significant Imaging: I have reviewed all pertinent imaging results/findings within the past 24 hours.    Assessment/Plan:      * Acute renal failure superimposed on stage 3b chronic kidney disease  Acute interstitial nephritis   - Cr 1.8 on admit, baseline 1.3  - Estimated Creatinine Clearance: 22.2 mL/min (A) (based on SCr of 2.1 mg/dL (H)).  - Likely 2/2 hypervolemia in setting of CHF exacerbation. Should improve with IV diuresis  - renally dose meds  - avoid nephrotoxic insults  - monitor I/Os  - monitor with daily BMP, replete lytes if necessary  - improved to 1.2 with diuresis  - new Cr bump of 1.9 on am labs, not improving s/p fluids or further diuresis  - urine lytes pending, repeat UA pending, urine urea pending, wrights stain pending    - FeNa calculated at 0.5%   -wrights stain with eosinophils   -discontinued all abx  - nephrology consulted for rise in Cr and possible AIN, appreciate recommendations:  - would hold on further diuresis in addition would no give IVFs at this time  - will follow-up UPCR and retroperitoneal US  - agree with stopping antibiotics especially double strength Bactrim  - RP US with chronic medical renal disease, no hydronephrosis, 3 mm nonobstructing right renal stone.     Acute cystitis  - UA infectious on admission   - Ucx currently growing staph aureus and enterococcus  - pending susceptibilities of culture  - switched IV rocephin to IV vanc  - bladder scan with 500mL, straight cath performed  - monitor patient for continued urinary retention  - switch to oral bactrim and augmentin  - concerns that abx could be causing MARQUEZ, transition to ampicillin and keflex  - 7d completed of abx, discontinued all due to AIN concerns    Urinary retention  - patient  with urinary retention on admission requiring straight cath   - q6hr bladder scans ordered   - flomax initiated   - mobilize patient as she has not been out of bed since admission   -  flomax initiated   - orosco placed due to continued urinary retention  - schedule FU with urology at discharge  - plan for voiding trial prior to discharge      Hypermagnesemia  RESOLVED   - mag 3.5 on am labs  - giving IVF and monitoring for improvement  - repeat pending - 2.2      Hypophosphatemia  RESOLVED  Patient has Abnormal Phosphorus: hypophosphatemia. Will continue to monitor electrolytes closely. Will replace the affected electrolytes and repeat labs to be done after interventions completed. The patient's phosphorus results have been reviewed and are listed below.  Recent Labs   Lab 05/15/24  0510   PHOS 3.1          Wound of right lower extremity  - afebrile without leukocytosis  - blood cultures pending   - one culture with micrococcus, probable contaminant   - repeat bcx drawn - currently NGTD  - wound care consulted    Elevated troponin  - trop 0.032 on admit, baseline ~0.028  - patient denies chest pain  - likely elevated 2/2 CHF exacerbation  - repeat trop pending --> at baseline  - monitor tele     Acute on chronic diastolic CHF (congestive heart failure)  - last echo with EF 60-65%, GII LV DD  - BNP significantly elevated to 1165  - appears overloaded on exam with BLE edema and increased weight gain over the past few days despite increasing her home lasix from daily to BID  - CXR consistent with pulmonary edema and small bilateral pleural effusions   - continue BB  - Monitor on telemetry.   - Patient is on CHF pathway.  Monitor strict Is&Os and daily weights.  Place on fluid restriction of 1.5 L.   - start IV diuresis with lasix 40 mg BID, increased to 80mg IV BID  - goal UOP of -2L per day   - diuresis held originally for concerns of over diuresis with Cr bump  - POCUS performed by attending found patient to be fluid  overloaded  - initiate 120mg IV lasix BID - holding for now  - repeat echo noted below    Results for orders placed during the hospital encounter of 05/07/24    Echo    Interpretation Summary    Left Ventricle: The left ventricle is normal in size. Ventricular mass is normal. Normal wall thickness. Normal wall motion. There is normal systolic function. Ejection fraction by visual approximation is 55%. Unable to assess diastolic function due to atrial fibrillation.    Right Ventricle: Normal right ventricular cavity size. Wall thickness is normal. Systolic function is mildly reduced.    Aortic Valve: The aortic valve is a trileaflet valve. There is moderate aortic valve sclerosis. There is annular calcification present. Mildly restricted motion. No stenosis.    Tricuspid Valve: There is mild to moderate regurgitation.    Pulmonary Artery: The estimated pulmonary artery systolic pressure is 44 mmHg.    IVC/SVC: Elevated venous pressure at 15 mmHg.    Chronic respiratory failure with hypoxia  - chronic and stable on home 2L    Centrilobular emphysema  Patient's COPD is controlled currently.  Patient is currently off COPD Pathway. Continue scheduled inhalers Supplemental oxygen and monitor respiratory status closely.   - satting well on home 2L NC    Hyperlipidemia  - continue statin     Paroxysmal atrial fibrillation  Patient with Paroxysmal (<7 days) atrial fibrillation which is controlled currently with Beta Blocker. Patient is currently in atrial fibrillation.[id number] Score: 3. Anticoagulation indicated. Anticoagulation done with eliquis 5 mg BID  .    Essential hypertension  - controlled on admit  - continue toprol 100 mg and losartan 50 mg daily   - holding home losartan  - IV diuresis as above  - patient with soft pressures  - patient asymptomatic, monitor closely      VTE Risk Mitigation (From admission, onward)           Ordered     apixaban tablet 2.5 mg  2 times daily         05/08/24 0050     IP VTE HIGH  RISK PATIENT  Once         05/07/24 2029     Reason for No Pharmacological VTE Prophylaxis  Once        Question:  Reasons:  Answer:  Already adequately anticoagulated on oral Anticoagulants    05/07/24 2029     Place sequential compression device  Until discontinued         05/07/24 2024                    Discharge Planning   NOHEMI: 5/16/2024     Code Status: Full Code   Is the patient medically ready for discharge?:     Reason for patient still in hospital (select all that apply): Patient trending condition, Laboratory test, Imaging, and Consult recommendations  Discharge Plan A: Home Health   Discharge Delays: None known at this time              Adina Wolff PA-C  Department of Hospital Medicine   [facility] - Observation 11H

## 2024-05-15 NOTE — SUBJECTIVE & OBJECTIVE
Interval History: Patient seen and examined. No acute events overnight. Afebrile with pulse ranging from 100-80s bpm. Systolic blood pressures ranging from 110-90s mmHg. She is saturating +93% on 2 liters via nasal cannula. Documented UOP of 400 mL in the last 24 hours. Creatinine now 2.6 from 2.1.     Review of patient's allergies indicates:   Allergen Reactions    Adhesive Rash     Pt states she removed a LATEX bandaid and the skin beneath was swollen and red. No other latex causes a reaction.     Current Facility-Administered Medications   Medication Frequency    acetaminophen tablet 1,000 mg Q8H PRN    acetaminophen tablet 650 mg Q4H PRN    albuterol-ipratropium 2.5 mg-0.5 mg/3 mL nebulizer solution 3 mL Q4H PRN    apixaban tablet 2.5 mg BID    aspirin EC tablet 81 mg Daily    atorvastatin tablet 20 mg Daily    bisacodyL suppository 10 mg Daily PRN    dextrose 10% bolus 125 mL 125 mL PRN    dextrose 10% bolus 250 mL 250 mL PRN    fluticasone furoate-vilanteroL 100-25 mcg/dose diskus inhaler 1 puff Daily    glucagon (human recombinant) injection 1 mg PRN    glucose chewable tablet 16 g PRN    glucose chewable tablet 24 g PRN    melatonin tablet 6 mg Nightly PRN    methocarbamoL tablet 500 mg QID PRN    metoprolol succinate (TOPROL-XL) 24 hr tablet 100 mg Daily    miconazole NITRATE 2 % top powder BID    ondansetron disintegrating tablet 8 mg Q8H PRN    polyethylene glycol packet 17 g Daily    potassium bicarbonate disintegrating tablet 20 mEq Once    promethazine tablet 25 mg Q6H PRN    sodium chloride 0.9% flush 10 mL PRN    tamsulosin 24 hr capsule 0.4 mg Daily       Objective:     Vital Signs (Most Recent):  Temp: 97.7 °F (36.5 °C) (05/15/24 0423)  Pulse: 93 (05/15/24 0423)  Resp: 17 (05/15/24 0423)  BP: 114/61 (05/15/24 0423)  SpO2: (!) 93 % (05/15/24 0423) Vital Signs (24h Range):  Temp:  [97.5 °F (36.4 °C)-98.3 °F (36.8 °C)] 97.7 °F (36.5 °C)  Pulse:  [] 93  Resp:  [16-20] 17  SpO2:  [93 %-96 %] 93  %  BP: ()/(57-69) 114/61     Weight: 82.6 kg (182 lb 1.6 oz) (05/15/24 0400)  Body mass index is 31.26 kg/m².  Body surface area is 1.93 meters squared.    I/O last 3 completed shifts:  In: -   Out: 1950 [Urine:1950]     Physical Exam  Vitals and nursing note reviewed.   Constitutional:       General: She is awake. She is not in acute distress.     Appearance: She is well-developed. She is obese. She is ill-appearing. She is not diaphoretic.      Interventions: Nasal cannula in place.   HENT:      Head: Normocephalic and atraumatic.      Right Ear: External ear normal.      Left Ear: External ear normal.      Nose:      Comments: Nasal cannula in place.     Mouth/Throat:      Mouth: Mucous membranes are moist.      Pharynx: Oropharynx is clear. No oropharyngeal exudate or posterior oropharyngeal erythema.   Eyes:      General: No scleral icterus.        Right eye: No discharge.         Left eye: No discharge.      Extraocular Movements: Extraocular movements intact.      Conjunctiva/sclera: Conjunctivae normal.   Cardiovascular:      Rate and Rhythm: Normal rate.      Heart sounds: Murmur heard.   Pulmonary:      Effort: Pulmonary effort is normal. No respiratory distress.      Breath sounds: No wheezing, rhonchi or rales.   Abdominal:      General: Bowel sounds are normal. There is no distension.      Palpations: Abdomen is soft.      Tenderness: There is no abdominal tenderness.   Genitourinary:     Comments: Muniz catheter in place.  Musculoskeletal:      Cervical back: Neck supple.      Right lower leg: Edema present.      Left lower leg: Edema present.   Skin:     General: Skin is warm and dry.      Coloration: Skin is not jaundiced.      Findings: Bruising, erythema and rash present.   Neurological:      General: No focal deficit present.      Mental Status: She is alert. Mental status is at baseline.      Cranial Nerves: No cranial nerve deficit.      Motor: No weakness.   Psychiatric:         Mood and  Affect: Mood normal.         Behavior: Behavior normal. Behavior is cooperative.          Significant Labs:  BMP:   Recent Labs   Lab 05/14/24  0653 05/14/24  0746   GLU 91  --      --    K 3.7  --      --    CO2 25  --    BUN 35*  --    CREATININE 2.1*  --    CALCIUM 9.4  --    MG  --  2.1     CBC:   Recent Labs   Lab 05/12/24  0758   WBC 8.45   RBC 3.88*   HGB 11.7*   HCT 37.6      MCV 97   MCH 30.2   MCHC 31.1*     CMP:   Recent Labs   Lab 05/14/24  0653   GLU 91   CALCIUM 9.4      K 3.7   CO2 25      BUN 35*   CREATININE 2.1*     Microbiology Results (last 7 days)       Procedure Component Value Units Date/Time    Blood culture [[id number]] Collected: 05/09/24 1433    Order Status: Completed Specimen: Blood Updated: 05/14/24 1612     Blood Culture, Routine No growth after 5 days.    Blood culture [[id number]] Collected: 05/09/24 1434    Order Status: Completed Specimen: Blood Updated: 05/14/24 1612     Blood Culture, Routine No growth after 5 days.    Blood culture [[id number]] Collected: 05/07/24 2251    Order Status: Completed Specimen: Blood from Peripheral, Forearm, Right Updated: 05/13/24 0612     Blood Culture, Routine No growth after 5 days.    Blood culture [[id number]]  (Abnormal) Collected: 05/07/24 2251    Order Status: Completed Specimen: Blood from Peripheral, Hand, Right Updated: 05/11/24 1057     Blood Culture, Routine Gram stain aer bottle: Gram positive cocci in clusters resembling Staph      Results called to and read back by:Josephine Minor LPN 05/09/2024  13:59      MICROCOCCUS SPECIES  Organism is a probable contaminant      Urine culture [[id number]]  (Abnormal)  (Susceptibility) Collected: 05/08/24 0242    Order Status: Completed Specimen: Urine Updated: 05/10/24 1241     Urine Culture, Routine STAPHYLOCOCCUS AUREUS  > 100,000 cfu/ml        ENTEROCOCCUS FAECALIS  50,000 - 99,999 cfu/ml      Narrative:      Specimen Source->Urine    MRSA/SA Rapid ID by PCR from  Blood culture [[id number]] Collected: 05/09/24 1359    Order Status: Completed Updated: 05/09/24 2127     Staph aureus ID by PCR Negative     Methicillin Resistant ID by PCR Negative          Specimen (24h ago, onward)      None          Significant Imaging:  I have reviewed all imagining in the last 24 hours.

## 2024-05-15 NOTE — ASSESSMENT & PLAN NOTE
78-year-old woman with HFpEF (most recent TTE with EF 55% with G2DD), mild to moderate tricuspid regurgitation, HTN, paroxsymal atrial fibrillation on Eliquis, COPD on 2 liters O2, and CKD IIIb (baseline creatinine ~1.5) admitted to [facility] on 5/7 with acute on chronic CHF exacerbation, MARQUEZ with creatinine 1.8, UTI growing S. aureus & Enterococcus faecalis and concern for right lower extremity cellulitis. She was management in the usually fashion with IV diuresis with Lasix in addition to receiving several different antibiotics including ampicillin, Augmentin, Rocephin, cephalexin, DS Bactrim and vancomycin. Her admission has been complicated by urinary retention necessitating Muniz catheter placement, hypotension (lowest document BP 87/54 mmHg) and worsening renal function for which Nephrology has been consulted. Creatinine trend this admission is as follows 1.8 > 1.5 > 1.2 > 1.3 > 1.9 > 1.8 > 1.9 > 2.1. Mcgarry stain was noted to be positive for occasional eosinophils.  Urinary sediment was assessed which demonstrated innumerable WBCs with some within waxy casts, non dysmorphic RBCs, waxy casts and occasional bacteria. UPCR only 0.2 and retroperitoneal US with changes consistent with chronic medical renal disease, no hydronephrosis but 3 mm nonobstructing right renal stone was noted.    Plan/Recommendations:  - etiology of MARQUEZ not clear, could be pre-renal in the setting of diuresis versus AIN with multiple classes of antibiotics given (including DS Bactrim with trimethoprim being known to inhibit creatinine secretion at organic cation transporters within the proximal tubule)  - recommend trial of LR at 100 mL/hr and reassess UOP and renal function   - agree with stopping antibiotics for now  - would keep Muniz in place in light of urinary retention  - monitor renal function and UOP over next 12 hours and reassess   - daily RFP and magnesium levels  - renal diet/tube feeds when not NPO with volume restriction per  primary team  - strict I/O's and daily weights  - renally all dose medications to eGFR   - avoid nephrotoxic agents wean feasible (i.e. NSAIDs, intra-arterial contrast, supra-therapeutic vancomycin levels, etc.)  - no acute indications for RRT at this time however will continue to monitor closely

## 2024-05-15 NOTE — ASSESSMENT & PLAN NOTE
Patient with Paroxysmal (<7 days) atrial fibrillation which is controlled currently with Beta Blocker. Patient is currently in atrial fibrillation.[id number] Score: 3. Anticoagulation indicated. Anticoagulation done with eliquis 5 mg BID  .

## 2024-05-15 NOTE — PT/OT/SLP EVAL
Occupational Therapy   Evaluation    Name: Misa Barajas  MRN: [id number]  Admitting Diagnosis: Acute renal failure superimposed on stage 3b chronic kidney disease  Recent Surgery: * No surgery found *      Recommendations:     Discharge Recommendations: Low Intensity Therapy  Discharge Equipment Recommendations:  walker, rolling  Barriers to discharge:  None    Assessment:     Misa Barajas is a 78 y.o. female with a medical diagnosis of Acute renal failure superimposed on stage 3b chronic kidney disease.  She presents with edema and fatigue. Performance deficits affecting function: weakness, impaired endurance, impaired self care skills, impaired functional mobility, gait instability, impaired balance, decreased upper extremity function, decreased safety awareness, pain, decreased ROM.  PTA, pt reports being mod-I to Indp with mobility and ADLs. Upon eval, pt is limited by fatigue.    Rehab Prognosis: Good; patient would benefit from acute skilled OT services to address these deficits and reach maximum level of function.       Plan:     Patient to be seen 3 x/week to address the above listed problems via self-care/home management, therapeutic activities, therapeutic exercises  Plan of Care Expires: 06/14/24  Plan of Care Reviewed with: patient    Subjective     Chief Complaint: Fatigue  Patient/Family Comments/goals: return home    Occupational Profile:  Living Environment: Lives alone in [facility] with 1 lisha and WIS in bathroom.  Previous level of function: Indp  Roles and Routines: mother  Equipment Used at Home: bedside commode, oxygen, walker, rolling  Assistance upon Discharge: daughters    Pain/Comfort:  Pain Rating 1: 0/10    Patients cultural, spiritual, Denominational conflicts given the current situation: no    Objective:     Communicated with: Nsg prior to session.  Patient found HOB elevated with telemetry, oxygen upon OT entry to room.    General Precautions: Standard, fall  Orthopedic Precautions:  N/A  Braces: N/A  Respiratory Status: Nasal cannula, flow 2 L/min    Occupational Performance:    Bed Mobility:    Patient completed Supine to Sit with stand by assistance  Patient completed Sit to Supine with stand by assistance    Functional Mobility/Transfers:  Functional Mobility: PT declined 2/2 already worked with PT    Activities of Daily Living:  Pt declined ADLs    Therapeutic Activities:  Pt demonstrates understanding of Energy conservation and work simplification via handout.    Cognitive/Visual Perceptual:  A&O x4    Physical Exam:  BUE WFL    AMPAC 6 Click ADL:  AMPAC Total Score: 24    Treatment & Education:  Pt educated on role and purpose of therapy  Pt educated on goal setting  Pt educated on benefits of OOB activity  Pt educated on self advocacy     Patient left HOB elevated with all lines intact, call button in reach, and nsg notified    GOALS:   Multidisciplinary Problems       Occupational Therapy Goals          Problem: Occupational Therapy    Goal Priority Disciplines Outcome Interventions   Occupational Therapy Goal     OT, PT/OT Progressing    Description: Goals to be met by: 6/14/24     Patient will increase functional independence with ADLs by performing:    UE Dressing with [place].  LE Dressing with [place].  Grooming while standing at sink with Set-up Assistance and Supervision.  Toileting from toilet with Modified [place] for hygiene and clothing management.   All functional transfers performed c/ Supervision                         History:     Past Medical History:   Diagnosis Date    Acute hypoxemic respiratory failure 6/26/2021    Acute superficial venous thrombosis of lower extremity, bilateral 1/25/2022    Aortic atherosclerosis     Arthritis     knee joint pain    Asthma-COPD overlap syndrome 8/22/2022    Breast cancer 2002    left breast & lymph nodes-s/p sx with chemo    Bronchitis     with flu-2/2014    Cataracts, bilateral     Chronic anticoagulation 5/4/2022     Chronic diastolic heart failure 12/24/2019    Chronic kidney disease, stage 3     History of chemotherapy     last treatment 12/2002 (had 8 treatments)    Hyperlipidemia 11/20/2016    Hypertension     Lymphedema of both lower extremities 1/25/2022    Nephrolithiasis 6/2/2014    Osteoporosis     Paroxysmal atrial fibrillation 6/7/2021    Renal calculi     hematuria    Renal osteodystrophy 1/14/2016    Venous stasis dermatitis of both lower extremities 1/25/2022    Vitamin D insufficiency          Past Surgical History:   Procedure Laterality Date    BREAST BIOPSY Left 2002    core bx, +    BREAST BIOPSY Right 2018    core    BREAST BIOPSY Right 2019    BREAST LUMPECTOMY Left 2002    CYSTOSCOPY  4/28/2022    Procedure: CYSTOSCOPY;  Surgeon: Vik Ware MD;  Location: [facility] OR 1ST FLR;  Service: Urology;;    CYSTOSCOPY  5/30/2022    Procedure: CYSTOSCOPY;  Surgeon: Bonnie Knutson MD;  Location: [facility] OR 1ST FLR;  Service: Urology;;    LASER LITHOTRIPSY  5/30/2022    Procedure: LITHOTRIPSY, USING LASER;  Surgeon: Bonnie Knutson MD;  Location: [facility] OR 1ST FLR;  Service: Urology;;    LITHOTRIPSY      MASTECTOMY Left 06/2002    left-& lymph node dissection    REPLACEMENT OF STENT Right 5/30/2022    Procedure: REPLACEMENT, STENT;  Surgeon: Bonnie Knutson MD;  Location: [facility] OR 1ST FLR;  Service: Urology;  Laterality: Right;    RETROGRADE PYELOGRAPHY Right 4/28/2022    Procedure: PYELOGRAM, RETROGRADE;  Surgeon: Vik Ware MD;  Location: [facility] OR 1ST FLR;  Service: Urology;  Laterality: Right;    ROBOT-ASSISTED LAPAROSCOPIC PARTIAL NEPHRECTOMY USING DA JESÚS XI Right 3/11/2021    Procedure: XI ROBOTIC NEPHRECTOMY, PARTIAL;  Surgeon: Taz Ortega MD;  Location: [facility] OR 2ND FLR;  Service: Urology;  Laterality: Right;  4hr/ gen with regional  Critical access hospital confirmation:  [id number] for 11:15am case NC    URETEROSCOPIC REMOVAL OF URETERIC CALCULUS Right 5/30/2022    Procedure: REMOVAL,  CALCULUS, URETER, URETEROSCOPIC;  Surgeon: Bonnie Knutson MD;  Location: [facility] OR [address] [place] [address];  Service: Urology;  Laterality: Right;    ureteroscopy Bilateral     6.14    URETEROSCOPY Right 5/30/2022    Procedure: URETEROSCOPY;  Surgeon: Bonnie Knutson MD;  Location: [facility] OR [address] [place] [address];  Service: Urology;  Laterality: Right;       Time Tracking:     OT Date of Treatment: 05/15/24  OT Start Time: 1204  OT Stop Time: 1220  OT Total Time (min): 16 min    Billable Minutes:Evaluation 8  Therapeutic Activity 8    5/15/2024

## 2024-05-16 PROBLEM — E66.09 CLASS 1 OBESITY DUE TO EXCESS CALORIES WITH SERIOUS COMORBIDITY AND BODY MASS INDEX (BMI) OF 30.0 TO 30.9 IN ADULT: Status: ACTIVE | Noted: 2024-05-16

## 2024-05-16 PROBLEM — E66.811 CLASS 1 OBESITY DUE TO EXCESS CALORIES WITH SERIOUS COMORBIDITY AND BODY MASS INDEX (BMI) OF 30.0 TO 30.9 IN ADULT: Status: ACTIVE | Noted: 2024-05-16

## 2024-05-16 LAB
ANION GAP SERPL CALC-SCNC: 10 MMOL/L (ref 8–16)
ANION GAP SERPL CALC-SCNC: 8 MMOL/L (ref 8–16)
BASOPHILS # BLD AUTO: 0.08 K/UL (ref 0–0.2)
BASOPHILS NFR BLD: 1.3 % (ref 0–1.9)
BUN SERPL-MCNC: 49 MG/DL (ref 8–23)
BUN SERPL-MCNC: 50 MG/DL (ref 8–23)
CALCIUM SERPL-MCNC: 10 MG/DL (ref 8.7–10.5)
CALCIUM SERPL-MCNC: 10.1 MG/DL (ref 8.7–10.5)
CHLORIDE SERPL-SCNC: 103 MMOL/L (ref 95–110)
CHLORIDE SERPL-SCNC: 103 MMOL/L (ref 95–110)
CO2 SERPL-SCNC: 25 MMOL/L (ref 23–29)
CO2 SERPL-SCNC: 26 MMOL/L (ref 23–29)
CREAT SERPL-MCNC: 2.5 MG/DL (ref 0.5–1.4)
CREAT SERPL-MCNC: 2.5 MG/DL (ref 0.5–1.4)
DIFFERENTIAL METHOD BLD: ABNORMAL
EOSINOPHIL # BLD AUTO: 0.2 K/UL (ref 0–0.5)
EOSINOPHIL NFR BLD: 3.8 % (ref 0–8)
ERYTHROCYTE [DISTWIDTH] IN BLOOD BY AUTOMATED COUNT: 13.2 % (ref 11.5–14.5)
EST. GFR  (NO RACE VARIABLE): 19 ML/MIN/1.73 M^2
EST. GFR  (NO RACE VARIABLE): 19 ML/MIN/1.73 M^2
GLUCOSE SERPL-MCNC: 100 MG/DL (ref 70–110)
GLUCOSE SERPL-MCNC: 85 MG/DL (ref 70–110)
HCT VFR BLD AUTO: 33.7 % (ref 37–48.5)
HGB BLD-MCNC: 11.2 G/DL (ref 12–16)
IMM GRANULOCYTES # BLD AUTO: 0.02 K/UL (ref 0–0.04)
IMM GRANULOCYTES NFR BLD AUTO: 0.3 % (ref 0–0.5)
LYMPHOCYTES # BLD AUTO: 1.2 K/UL (ref 1–4.8)
LYMPHOCYTES NFR BLD: 19.6 % (ref 18–48)
MAGNESIUM SERPL-MCNC: 2.1 MG/DL (ref 1.6–2.6)
MCH RBC QN AUTO: 30.6 PG (ref 27–31)
MCHC RBC AUTO-ENTMCNC: 33.2 G/DL (ref 32–36)
MCV RBC AUTO: 92 FL (ref 82–98)
MONOCYTES # BLD AUTO: 0.7 K/UL (ref 0.3–1)
MONOCYTES NFR BLD: 12.2 % (ref 4–15)
NEUTROPHILS # BLD AUTO: 3.8 K/UL (ref 1.8–7.7)
NEUTROPHILS NFR BLD: 62.8 % (ref 38–73)
NRBC BLD-RTO: 0 /100 WBC
PHOSPHATE SERPL-MCNC: 3.1 MG/DL (ref 2.7–4.5)
PLATELET # BLD AUTO: 202 K/UL (ref 150–450)
PMV BLD AUTO: 9.9 FL (ref 9.2–12.9)
POTASSIUM SERPL-SCNC: 3.7 MMOL/L (ref 3.5–5.1)
POTASSIUM SERPL-SCNC: 3.8 MMOL/L (ref 3.5–5.1)
RBC # BLD AUTO: 3.66 M/UL (ref 4–5.4)
SODIUM SERPL-SCNC: 137 MMOL/L (ref 136–145)
SODIUM SERPL-SCNC: 138 MMOL/L (ref 136–145)
WBC # BLD AUTO: 5.98 K/UL (ref 3.9–12.7)

## 2024-05-16 PROCEDURE — 97530 THERAPEUTIC ACTIVITIES: CPT | Mod: CQ

## 2024-05-16 PROCEDURE — 25000003 PHARM REV CODE 250: Performed by: PHYSICIAN ASSISTANT

## 2024-05-16 PROCEDURE — 20600001 HC STEP DOWN PRIVATE ROOM

## 2024-05-16 PROCEDURE — 80048 BASIC METABOLIC PNL TOTAL CA: CPT

## 2024-05-16 PROCEDURE — 99900035 HC TECH TIME PER 15 MIN (STAT)

## 2024-05-16 PROCEDURE — 99232 SBSQ HOSP IP/OBS MODERATE 35: CPT | Mod: ,,, | Performed by: INTERNAL MEDICINE

## 2024-05-16 PROCEDURE — 25000003 PHARM REV CODE 250

## 2024-05-16 PROCEDURE — 97530 THERAPEUTIC ACTIVITIES: CPT

## 2024-05-16 PROCEDURE — 84100 ASSAY OF PHOSPHORUS: CPT

## 2024-05-16 PROCEDURE — 85025 COMPLETE CBC W/AUTO DIFF WBC: CPT

## 2024-05-16 PROCEDURE — 97116 GAIT TRAINING THERAPY: CPT | Mod: CQ

## 2024-05-16 PROCEDURE — 36415 COLL VENOUS BLD VENIPUNCTURE: CPT

## 2024-05-16 PROCEDURE — 94640 AIRWAY INHALATION TREATMENT: CPT

## 2024-05-16 PROCEDURE — 83735 ASSAY OF MAGNESIUM: CPT

## 2024-05-16 PROCEDURE — 97535 SELF CARE MNGMENT TRAINING: CPT

## 2024-05-16 PROCEDURE — 94761 N-INVAS EAR/PLS OXIMETRY MLT: CPT

## 2024-05-16 PROCEDURE — 27000221 HC OXYGEN, UP TO 24 HOURS

## 2024-05-16 RX ADMIN — APIXABAN 2.5 MG: 2.5 TABLET, FILM COATED ORAL at 08:05

## 2024-05-16 RX ADMIN — MICONAZOLE NITRATE 2 % TOPICAL POWDER: at 08:05

## 2024-05-16 RX ADMIN — TAMSULOSIN HYDROCHLORIDE 0.4 MG: 0.4 CAPSULE ORAL at 08:05

## 2024-05-16 RX ADMIN — ATORVASTATIN CALCIUM 20 MG: 20 TABLET, FILM COATED ORAL at 08:05

## 2024-05-16 RX ADMIN — FLUTICASONE FUROATE AND VILANTEROL TRIFENATATE 1 PUFF: 100; 25 POWDER RESPIRATORY (INHALATION) at 07:05

## 2024-05-16 RX ADMIN — METOPROLOL SUCCINATE 100 MG: 100 TABLET, EXTENDED RELEASE ORAL at 08:05

## 2024-05-16 RX ADMIN — ASPIRIN 81 MG: 81 TABLET, COATED ORAL at 08:05

## 2024-05-16 NOTE — ASSESSMENT & PLAN NOTE
Acute interstitial nephritis   - Cr 1.8 on admit, baseline 1.3  - Estimated Creatinine Clearance: 18.3 mL/min (A) (based on SCr of 2.5 mg/dL (H)).  - Likely 2/2 hypervolemia in setting of CHF exacerbation. Should improve with IV diuresis  - renally dose meds  - avoid nephrotoxic insults  - monitor I/Os  - monitor with daily BMP, replete lytes if necessary  - improved to 1.2 with diuresis  - new Cr bump of 1.9 on am labs, not improving s/p fluids or further diuresis  - urine lytes pending, repeat UA pending, urine urea pending, wrights stain pending    - FeNa calculated at 0.5%   -wrights stain with eosinophils   -discontinued all abx  - nephrology consulted for rise in Cr and possible AIN, appreciate recommendations:  - would hold on further diuresis in addition would no give IVFs at this time  - will follow-up UPCR and retroperitoneal US  - agree with stopping antibiotics especially double strength Bactrim  - RP US with chronic medical renal disease, no hydronephrosis, 3 mm nonobstructing right renal stone.

## 2024-05-16 NOTE — CARE UPDATE
I have reviewed the chart of Misa Barajas who is hospitalized for the following:    Active Hospital Problems    Diagnosis    *Acute renal failure superimposed on stage 3b chronic kidney disease    Class 1 obesity due to excess calories with serious comorbidity and body mass index (BMI) of 30.0 to 30.9 in adult    Acute interstitial nephritis    Hypermagnesemia    Urinary retention    Hypophosphatemia    Elevated troponin    Wound of right lower extremity    Acute cystitis    Acute on chronic diastolic CHF (congestive heart failure)    Chronic respiratory failure with hypoxia    Centrilobular emphysema    Paroxysmal atrial fibrillation    Hyperlipidemia    Essential hypertension        Savanah Garcia NP  [facility]

## 2024-05-16 NOTE — PROGRESS NOTES
[facility] - [facility]iology Stepdown  Wound Care    Patient Name:  Misa Barajas   MRN:  [id number]  Date: 5/16/2024  Diagnosis: Acute renal failure superimposed on stage 3b chronic kidney disease    History:     Past Medical History:   Diagnosis Date    Acute hypoxemic respiratory failure 6/26/2021    Acute superficial venous thrombosis of lower extremity, bilateral 1/25/2022    Aortic atherosclerosis     Arthritis     knee joint pain    Asthma-COPD overlap syndrome 8/22/2022    Breast cancer 2002    left breast & lymph nodes-s/p sx with chemo    Bronchitis     with flu-2/2014    Cataracts, bilateral     Chronic anticoagulation 5/4/2022    Chronic diastolic heart failure 12/24/2019    Chronic kidney disease, stage 3     Class 1 obesity due to excess calories with serious comorbidity and body mass index (BMI) of 30.0 to 30.9 in adult 5/16/2024    History of chemotherapy     last treatment 12/2002 (had 8 treatments)    Hyperlipidemia 11/20/2016    Hypertension     Lymphedema of both lower extremities 1/25/2022    Nephrolithiasis 6/2/2014    Osteoporosis     Paroxysmal atrial fibrillation 6/7/2021    Renal calculi     hematuria    Renal osteodystrophy 1/14/2016    Venous stasis dermatitis of both lower extremities 1/25/2022    Vitamin D insufficiency        Social History     Socioeconomic History    Marital status:    Tobacco Use    Smoking status: Former     Current packs/day: 0.00     Average packs/day: 1 pack/day for 50.0 years (50.0 ttl pk-yrs)     Types: Cigarettes     Start date: 1960     Quit date: 5/20/2009     Years since quitting: 15.0    Smokeless tobacco: Never   Substance and Sexual Activity    Alcohol use: No    Drug use: No    Sexual activity: Not Currently     Partners: Male     Birth control/protection: Partner-Vasectomy     Social Determinants of Health     Financial Resource Strain: Low Risk  (3/3/2024)    Overall Financial Resource Strain (CARDIA)     Difficulty of Paying Living Expenses: Not  very hard   Food Insecurity: No Food Insecurity (3/3/2024)    Hunger Vital Sign     Worried About Running Out of Food in the Last Year: Never true     Ran Out of Food in the Last Year: Never true   Transportation Needs: No Transportation Needs (3/3/2024)    PRAPARE - Transportation     Lack of Transportation (Medical): No     Lack of Transportation (Non-Medical): No   Physical Activity: Inactive (3/3/2024)    Exercise Vital Sign     Days of Exercise per Week: 0 days     Minutes of Exercise per Session: 0 min   Stress: No Stress Concern Present (3/3/2024)    [employer] [place] [employer] - Occupational Stress Questionnaire     Feeling of Stress : Not at all   Housing Stability: Low Risk  (3/3/2024)    Housing Stability Vital Sign     Unable to Pay for Housing in the Last Year: No     Number of Places Lived in the Last Year: 1     Unstable Housing in the Last Year: No       Precautions:     Allergies as of 05/07/2024 - Reviewed 05/07/2024   Allergen Reaction Noted    Adhesive Rash 05/30/2014       WOC Assessment Details/Treatment   Patient seen for wound care follow-up.   Reviewed chart for this encounter.   See Flow Sheet for findings.     Pt in bed and agreeable to care. The right breast fold and abdominal folds are intact, pink, and moist. Miconazole powder applied. Ruptured blister to the right shin with a red and dry wound bed. The wound was cleansed with NS and left open to air. Pt cleansed due to incontinent stool episode. The buttocks are intact, pink and blanchable. Barrier cream applied.      RECOMMENDATIONS:  - Skin folds: bedside nursing to cleanse with soap and water, pat dry and apply miconazole powder bid/prn  - Turning every 2 hours  - Bedside nursing to maintain pressure injury prevention interventions       05/16/24 1129        Wound 05/08/24 0026 Intertrigo Right Breast   Date First Assessed/Time First Assessed: 05/08/24 0026   Primary Wound Type: Intertrigo  Side: Right  Location: Breast    Dressing Appearance Open to air   Drainage Amount None   Appearance Intact;Pink;Moist   Periwound Area Intact;Dry   Care Cleansed with:;Soap and water   Dressing Applied;Other (comment)  (miconazole powder)        Wound 05/08/24 0028 Intertrigo lower Abdomen   Date First Assessed/Time First Assessed: 05/08/24 0028   Primary Wound Type: Intertrigo  Orientation: lower  Location: Abdomen   Dressing Appearance Open to air   Drainage Amount None   Appearance Intact;Pink;Moist   Periwound Area Intact;Dry   Care Cleansed with:;Soap and water   Dressing Applied;Other (comment)  (miconazole powder)        Wound 05/08/24 0030 Moisture associated dermatitis Buttocks   Date First Assessed/Time First Assessed: 05/08/24 0030   Primary Wound Type: (c) Moisture associated dermatitis  Location: Buttocks   Dressing Appearance Open to air   Drainage Amount None   Appearance Intact;Pink;Moist;Other (see comments)  (blanchable)   Care Cleansed with:;Soap and water   Dressing Applied;Other (comment)  (white barrier cream)   Periwound Care Moisture barrier applied        Wound 05/08/24 0031 Blister(s) Right anterior;lower Leg   Date First Assessed/Time First Assessed: 05/08/24 0031   Primary Wound Type: Blister(s)  Side: Right  Orientation: anterior;lower  Location: Leg   Wound Image    Dressing Appearance Dry;Intact   Drainage Amount None   Appearance Intact;Red;Dry   Periwound Area Intact;Dry   Care Cleansed with:;Sterile normal saline   Dressing Removed;Foam         Recommendations made to primary team for above plan via secure chat. Wound care will sign off, re-consult as needed.    05/16/2024

## 2024-05-16 NOTE — PLAN OF CARE
Pt AAOx4, VSS, & breathing unlabored. Pt on 2L oxygen via NC. Pt remained free of any injury, fall, or trauma. Bed in lowest position, locked, call light in reach, & side rail up x2.  Will continue plan of care.     Problem: Comorbidity Management  Goal: Maintenance of COPD Symptom Control  Outcome: Progressing  Goal: Blood Pressure in Desired Range  Outcome: Progressing     Problem: Dysrhythmia  Goal: Normalized Cardiac Rhythm  Outcome: Progressing     Problem: Skin or Soft Tissue Infection  Goal: Absence of Infection Signs and Symptoms  Outcome: Progressing     Problem: UTI (Urinary Tract Infection)  Goal: Improved Infection Symptoms  Outcome: Progressing     Problem: Urinary Retention  Goal: Effective Urinary Elimination  Outcome: Progressing

## 2024-05-16 NOTE — PROGRESS NOTES
Entered pt's room for rounds and noticed some blood tinged urine in catheter line.   Pt doesn't believe the catheter was pulled on in any way and currently denies any discomfort.  Notified MARISA Grayson of findings.   No orders received at this time.   Will continue to monitor.

## 2024-05-16 NOTE — SUBJECTIVE & OBJECTIVE
Interval History: Patient seen and assessed at bedside. Afebrile, soft BP, otherwise VSS. Cr 2.5 on am labs s/p 1L gentle IVF over 10 hours yesterday. Patient still with REED, not worsening or improving. Nephrology following. Monitor VSS.       Review of Systems   Constitutional:  Negative for activity change, chills and fever.   HENT:  Negative for trouble swallowing.    Eyes:  Negative for photophobia and visual disturbance.   Respiratory:  Negative for cough, chest tightness and shortness of breath (improved).         +REED   Cardiovascular:  Positive for leg swelling. Negative for chest pain and palpitations.   Gastrointestinal:  Negative for abdominal pain, constipation, diarrhea, nausea and vomiting.   Genitourinary:  Negative for dysuria, frequency and hematuria.   Musculoskeletal:  Negative for back pain, gait problem and neck pain.   Skin:  Positive for color change. Negative for rash and wound.   Neurological:  Negative for dizziness, syncope, speech difficulty and light-headedness.   Psychiatric/Behavioral:  Negative for agitation and confusion. The patient is not nervous/anxious.      Objective:     Vital Signs (Most Recent):  Temp: 97.6 °F (36.4 °C) (05/16/24 0807)  Pulse: 94 (05/16/24 0807)  Resp: 18 (05/16/24 0807)  BP: (!) 89/51 (05/16/24 0807)  SpO2: (!) 90 % (05/16/24 0807) Vital Signs (24h Range):  Temp:  [97.3 °F (36.3 °C)-97.9 °F (36.6 °C)] 97.6 °F (36.4 °C)  Pulse:  [] 94  Resp:  [16-19] 18  SpO2:  [90 %-96 %] 90 %  BP: ()/(51-65) 89/51     Weight: 79.8 kg (175 lb 14.8 oz)  Body mass index is 30.2 kg/m².    Intake/Output Summary (Last 24 hours) at 5/16/2024 0903  Last data filed at 5/16/2024 0610  Gross per 24 hour   Intake 0 ml   Output 650 ml   Net -650 ml         Physical Exam  Vitals and nursing note reviewed.   Constitutional:       General: She is not in acute distress.     Appearance: She is well-developed.   HENT:      Head: Normocephalic and atraumatic.      Mouth/Throat:       Pharynx: No oropharyngeal exudate.   Eyes:      General: No scleral icterus.     Conjunctiva/sclera: Conjunctivae normal.   Cardiovascular:      Rate and Rhythm: Normal rate and regular rhythm.      Heart sounds: Normal heart sounds.   Pulmonary:      Effort: Pulmonary effort is normal. No respiratory distress.      Breath sounds: Normal breath sounds. No wheezing or rales.      Comments: NC in place  Abdominal:      General: Bowel sounds are normal. There is no distension.      Palpations: Abdomen is soft.      Tenderness: There is no abdominal tenderness.   Genitourinary:     Comments: Muniz in place  Musculoskeletal:         General: No tenderness. Normal range of motion.      Cervical back: Normal range of motion and neck supple.      Right lower leg: Edema present.      Left lower leg: Edema present.   Lymphadenopathy:      Cervical: No cervical adenopathy.   Skin:     General: Skin is warm and dry.      Capillary Refill: Capillary refill takes less than 2 seconds.      Findings: Erythema and rash present.   Neurological:      Mental Status: She is alert and oriented to person, place, and time.      Cranial Nerves: No cranial nerve deficit.      Sensory: No sensory deficit.      Coordination: Coordination normal.   Psychiatric:         Behavior: Behavior normal.         Thought Content: Thought content normal.         Judgment: Judgment normal.             Significant Labs: All pertinent labs within the past 24 hours have been reviewed.    Significant Imaging: I have reviewed all pertinent imaging results/findings within the past 24 hours.

## 2024-05-16 NOTE — SUBJECTIVE & OBJECTIVE
Interval History: Patient seen and examined. No acute events overnight. Afebrile with pulse ranging from 100-70s bpm. Systolic blood pressures ranging from 110-80s mmHg. She is saturating +90% on 2 liters via nasal cannula. Documented UOP of 650 mL in the last 24 hours. Renal function largely stable today.    Review of patient's allergies indicates:   Allergen Reactions    Adhesive Rash     Pt states she removed a LATEX bandaid and the skin beneath was swollen and red. No other latex causes a reaction.     Current Facility-Administered Medications   Medication Frequency    acetaminophen tablet 1,000 mg Q8H PRN    acetaminophen tablet 650 mg Q4H PRN    albuterol-ipratropium 2.5 mg-0.5 mg/3 mL nebulizer solution 3 mL Q4H PRN    apixaban tablet 2.5 mg BID    aspirin EC tablet 81 mg Daily    atorvastatin tablet 20 mg Daily    bisacodyL suppository 10 mg Daily PRN    dextrose 10% bolus 125 mL 125 mL PRN    dextrose 10% bolus 250 mL 250 mL PRN    fluticasone furoate-vilanteroL 100-25 mcg/dose diskus inhaler 1 puff Daily    glucagon (human recombinant) injection 1 mg PRN    glucose chewable tablet 16 g PRN    glucose chewable tablet 24 g PRN    melatonin tablet 6 mg Nightly PRN    methocarbamoL tablet 500 mg QID PRN    metoprolol succinate (TOPROL-XL) 24 hr tablet 100 mg Daily    miconazole NITRATE 2 % top powder BID    ondansetron disintegrating tablet 8 mg Q8H PRN    polyethylene glycol packet 17 g Daily    potassium bicarbonate disintegrating tablet 20 mEq Once    promethazine tablet 25 mg Q6H PRN    sodium chloride 0.9% flush 10 mL PRN    tamsulosin 24 hr capsule 0.4 mg Daily       Objective:     Vital Signs (Most Recent):  Temp: 97.6 °F (36.4 °C) (05/16/24 0807)  Pulse: 94 (05/16/24 0807)  Resp: 18 (05/16/24 0807)  BP: (!) 89/51 (05/16/24 0807)  SpO2: (!) 90 % (05/16/24 0807) Vital Signs (24h Range):  Temp:  [97.3 °F (36.3 °C)-97.9 °F (36.6 °C)] 97.6 °F (36.4 °C)  Pulse:  [] 94  Resp:  [16-19] 18  SpO2:  [90  %-96 %] 90 %  BP: ()/(51-65) 89/51     Weight: 79.8 kg (175 lb 14.8 oz) (05/15/24 1043)  Body mass index is 30.2 kg/m².  Body surface area is 1.9 meters squared.    I/O last 3 completed shifts:  In: 240 [P.O.:240]  Out: 1050 [Urine:1050]     Physical Exam  Vitals and nursing note reviewed.   Constitutional:       General: She is awake. She is not in acute distress.     Appearance: She is well-developed. She is obese. She is ill-appearing. She is not diaphoretic.      Interventions: Nasal cannula in place.   HENT:      Head: Normocephalic and atraumatic.      Right Ear: External ear normal.      Left Ear: External ear normal.      Nose:      Comments: Nasal cannula in place.     Mouth/Throat:      Mouth: Mucous membranes are moist.      Pharynx: Oropharynx is clear. No oropharyngeal exudate or posterior oropharyngeal erythema.   Eyes:      General: No scleral icterus.        Right eye: No discharge.         Left eye: No discharge.      Extraocular Movements: Extraocular movements intact.      Conjunctiva/sclera: Conjunctivae normal.   Cardiovascular:      Rate and Rhythm: Normal rate.      Heart sounds: Murmur heard.   Pulmonary:      Effort: Pulmonary effort is normal. No respiratory distress.      Breath sounds: No wheezing, rhonchi or rales.   Abdominal:      General: Bowel sounds are normal. There is no distension.      Palpations: Abdomen is soft.      Tenderness: There is no abdominal tenderness.   Genitourinary:     Comments: Muniz catheter in place.  Musculoskeletal:      Cervical back: Neck supple.      Right lower leg: Edema present.      Left lower leg: Edema present.   Skin:     General: Skin is warm and dry.      Coloration: Skin is not jaundiced.      Findings: Bruising, erythema and rash present.   Neurological:      General: No focal deficit present.      Mental Status: She is alert. Mental status is at baseline.      Cranial Nerves: No cranial nerve deficit.      Motor: No weakness.    Psychiatric:         Mood and Affect: Mood normal.         Behavior: Behavior normal. Behavior is cooperative.          Significant Labs:  BMP:   Recent Labs   Lab 05/16/24  0230   GLU 85      K 3.8      CO2 25   BUN 50*   CREATININE 2.5*   CALCIUM 10.0   MG 2.1     CBC:   Recent Labs   Lab 05/16/24  0230   WBC 5.98   RBC 3.66*   HGB 11.2*   HCT 33.7*      MCV 92   MCH 30.6   MCHC 33.2     CMP:   Recent Labs   Lab 05/16/24  0230   GLU 85   CALCIUM 10.0      K 3.8   CO2 25      BUN 50*   CREATININE 2.5*     Microbiology Results (last 7 days)       Procedure Component Value Units Date/Time    Blood culture [[id number]] Collected: 05/09/24 1433    Order Status: Completed Specimen: Blood Updated: 05/14/24 1612     Blood Culture, Routine No growth after 5 days.    Blood culture [[id number]] Collected: 05/09/24 1434    Order Status: Completed Specimen: Blood Updated: 05/14/24 1612     Blood Culture, Routine No growth after 5 days.    Blood culture [[id number]] Collected: 05/07/24 2251    Order Status: Completed Specimen: Blood from Peripheral, Forearm, Right Updated: 05/13/24 0612     Blood Culture, Routine No growth after 5 days.    Blood culture [[id number]]  (Abnormal) Collected: 05/07/24 2251    Order Status: Completed Specimen: Blood from Peripheral, Hand, Right Updated: 05/11/24 1057     Blood Culture, Routine Gram stain aer bottle: Gram positive cocci in clusters resembling Staph      Results called to and read back by:Josephine Minor LPN 05/09/2024  13:59      MICROCOCCUS SPECIES  Organism is a probable contaminant      Urine culture [[id number]]  (Abnormal)  (Susceptibility) Collected: 05/08/24 0242    Order Status: Completed Specimen: Urine Updated: 05/10/24 1241     Urine Culture, Routine STAPHYLOCOCCUS AUREUS  > 100,000 cfu/ml        ENTEROCOCCUS FAECALIS  50,000 - 99,999 cfu/ml      Narrative:      Specimen Source->Urine    MRSA/SA Rapid ID by PCR from Blood culture  [[id number]] Collected: 05/09/24 1359    Order Status: Completed Updated: 05/09/24 2127     Staph aureus ID by PCR Negative     Methicillin Resistant ID by PCR Negative          Specimen (24h ago, onward)      None          Significant Imaging:  I have reviewed all imagining in the last 24 hours.

## 2024-05-16 NOTE — ASSESSMENT & PLAN NOTE
RESOLVED  Patient has Abnormal Phosphorus: hypophosphatemia. Will continue to monitor electrolytes closely. Will replace the affected electrolytes and repeat labs to be done after interventions completed. The patient's phosphorus results have been reviewed and are listed below.  Recent Labs   Lab 05/16/24  0230   PHOS 3.1

## 2024-05-16 NOTE — PROGRESS NOTES
[facility] - Cardiology [facility] Medicine  Progress Note    Patient Name: Misa Barajas  MRN: [id number]  Patient Class: IP- Inpatient   Admission Date: 5/7/2024  Length of Stay: 8 days  Attending Physician: Faisal Nixon MD  Primary Care Provider: Celia Carlisle MD        Subjective:     Principal Problem:Acute renal failure superimposed on stage 3b chronic kidney disease        HPI:  Misa Barajas is a 78 y.o. female with a PMHx of HTN, HLD, HFpEF, COPD on home O2 (2L NC baseline O2 sat 92%) and pAF presentswith increasing LE swelling.  She is gained 5 lb in the past 3 days despite increased Lasix use. She also endorses new redness and warmth with associated itching to her RLE. She has small pustules that she noticed to RLE a few days ago.  Denies fever, chills, chest pain, cough, SOB, abdominal pain, n/v/d, dysuria, headaches. Has chronic discoloration to BLEs without recent worsening.     ED: Afebrile . Hr 90s. Satting well on home 2L NC. CBC without leukocytosis or anemia. CMP notable for small MARQUEZ with Cr 1.8 (baseline 1.3) and BUN 26. BNP 1165. Trop 0.032 (0.028 at baseline). Lactic 1.2. CXR with Cardiomegaly with pulmonary vascular congestion and small bilateral pleural effusions, suggestive of pulmonary edema secondary to CHF. Given rocephin, toprol 100 mg and IV lasix 60 mg. Placed in observation for CHF exacerbation.     Overview/Hospital Course:  Misa Barajas is a 78 y.o. F who was admitted to  for further evaluation of acute on chronic diastolic CHF. Diuresing on IV lasix. Echo with EF 55%, unable to assess diastolic function due to a-fib, CVP 15mmHg. Cr 1.8 on admission most likely 2/2 to fluid overloaded, improved with diuresis. UA infectious, culture with staph aureus, enterococcus faecalis. IV rocephin switched to IV vancomycin. Transitioned to oral abx. Patient with urinary retention requiring straight cath. Initiated flomax and bladder scan q6hr. Continued retention -  orosco cath placed prior to discharge. Will set up urology follow-up OP. Cr bump of 1.9 after improvement with diuresis, originally thought to be due to possibly over-diuresis or abx. Unimproved with IVF. Mcgarry's stain with eosinophils, concerning for possible AIN - discontinued all abx. Nephrology consult placed for further recommendations. RP US with chronic medical renal disease, no hydronephrosis, 3mm non obstructing renal stone.     Interval History: Patient seen and assessed at bedside. Afebrile, soft BP, otherwise VSS. Cr 2.5 on am labs s/p 1L gentle IVF over 10 hours yesterday. Patient still with REED, not worsening or improving. Nephrology following. Monitor VSS.       Review of Systems   Constitutional:  Negative for activity change, chills and fever.   HENT:  Negative for trouble swallowing.    Eyes:  Negative for photophobia and visual disturbance.   Respiratory:  Negative for cough, chest tightness and shortness of breath (improved).         +REED   Cardiovascular:  Positive for leg swelling. Negative for chest pain and palpitations.   Gastrointestinal:  Negative for abdominal pain, constipation, diarrhea, nausea and vomiting.   Genitourinary:  Negative for dysuria, frequency and hematuria.   Musculoskeletal:  Negative for back pain, gait problem and neck pain.   Skin:  Positive for color change. Negative for rash and wound.   Neurological:  Negative for dizziness, syncope, speech difficulty and light-headedness.   Psychiatric/Behavioral:  Negative for agitation and confusion. The patient is not nervous/anxious.      Objective:     Vital Signs (Most Recent):  Temp: 97.6 °F (36.4 °C) (05/16/24 0807)  Pulse: 94 (05/16/24 0807)  Resp: 18 (05/16/24 0807)  BP: (!) 89/51 (05/16/24 0807)  SpO2: (!) 90 % (05/16/24 0807) Vital Signs (24h Range):  Temp:  [97.3 °F (36.3 °C)-97.9 °F (36.6 °C)] 97.6 °F (36.4 °C)  Pulse:  [] 94  Resp:  [16-19] 18  SpO2:  [90 %-96 %] 90 %  BP: ()/(51-65) 89/51     Weight:  79.8 kg (175 lb 14.8 oz)  Body mass index is 30.2 kg/m².    Intake/Output Summary (Last 24 hours) at 5/16/2024 0903  Last data filed at 5/16/2024 0610  Gross per 24 hour   Intake 0 ml   Output 650 ml   Net -650 ml         Physical Exam  Vitals and nursing note reviewed.   Constitutional:       General: She is not in acute distress.     Appearance: She is well-developed.   HENT:      Head: Normocephalic and atraumatic.      Mouth/Throat:      Pharynx: No oropharyngeal exudate.   Eyes:      General: No scleral icterus.     Conjunctiva/sclera: Conjunctivae normal.   Cardiovascular:      Rate and Rhythm: Normal rate and regular rhythm.      Heart sounds: Normal heart sounds.   Pulmonary:      Effort: Pulmonary effort is normal. No respiratory distress.      Breath sounds: Normal breath sounds. No wheezing or rales.      Comments: NC in place  Abdominal:      General: Bowel sounds are normal. There is no distension.      Palpations: Abdomen is soft.      Tenderness: There is no abdominal tenderness.   Genitourinary:     Comments: Muniz in place  Musculoskeletal:         General: No tenderness. Normal range of motion.      Cervical back: Normal range of motion and neck supple.      Right lower leg: Edema present.      Left lower leg: Edema present.   Lymphadenopathy:      Cervical: No cervical adenopathy.   Skin:     General: Skin is warm and dry.      Capillary Refill: Capillary refill takes less than 2 seconds.      Findings: Erythema and rash present.   Neurological:      Mental Status: She is alert and oriented to person, place, and time.      Cranial Nerves: No cranial nerve deficit.      Sensory: No sensory deficit.      Coordination: Coordination normal.   Psychiatric:         Behavior: Behavior normal.         Thought Content: Thought content normal.         Judgment: Judgment normal.             Significant Labs: All pertinent labs within the past 24 hours have been reviewed.    Significant Imaging: I have  reviewed all pertinent imaging results/findings within the past 24 hours.    Assessment/Plan:      * Acute renal failure superimposed on stage 3b chronic kidney disease  Acute interstitial nephritis   - Cr 1.8 on admit, baseline 1.3  - Estimated Creatinine Clearance: 18.3 mL/min (A) (based on SCr of 2.5 mg/dL (H)).  - Likely 2/2 hypervolemia in setting of CHF exacerbation. Should improve with IV diuresis  - renally dose meds  - avoid nephrotoxic insults  - monitor I/Os  - monitor with daily BMP, replete lytes if necessary  - improved to 1.2 with diuresis  - new Cr bump of 1.9 on am labs, not improving s/p fluids or further diuresis  - urine lytes pending, repeat UA pending, urine urea pending, wrights stain pending    - FeNa calculated at 0.5%   -wrights stain with eosinophils   -discontinued all abx  - nephrology consulted for rise in Cr and possible AIN, appreciate recommendations:  - would hold on further diuresis in addition would no give IVFs at this time  - will follow-up UPCR and retroperitoneal US  - agree with stopping antibiotics especially double strength Bactrim  - RP US with chronic medical renal disease, no hydronephrosis, 3 mm nonobstructing right renal stone.     Acute cystitis  - UA infectious on admission   - Ucx currently growing staph aureus and enterococcus  - pending susceptibilities of culture  - switched IV rocephin to IV vanc  - bladder scan with 500mL, straight cath performed  - monitor patient for continued urinary retention  - switch to oral bactrim and augmentin  - concerns that abx could be causing MARQUEZ, transition to ampicillin and keflex  - 7d completed of abx, discontinued all due to AIN concerns    Urinary retention  - patient with urinary retention on admission requiring straight cath   - q6hr bladder scans ordered   - flomax initiated   - mobilize patient as she has not been out of bed since admission   -  flomax initiated   - orosco placed due to continued urinary retention  -  schedule FU with urology at discharge  - plan for voiding trial prior to discharge      Hypermagnesemia  RESOLVED   - mag 3.5 on am labs  - giving IVF and monitoring for improvement  - repeat pending - 2.2      Hypophosphatemia  RESOLVED  Patient has Abnormal Phosphorus: hypophosphatemia. Will continue to monitor electrolytes closely. Will replace the affected electrolytes and repeat labs to be done after interventions completed. The patient's phosphorus results have been reviewed and are listed below.  Recent Labs   Lab 05/16/24  0230   PHOS 3.1          Wound of right lower extremity  - afebrile without leukocytosis  - blood cultures pending   - one culture with micrococcus, probable contaminant   - repeat bcx drawn - currently NGTD  - wound care consulted    Elevated troponin  - trop 0.032 on admit, baseline ~0.028  - patient denies chest pain  - likely elevated 2/2 CHF exacerbation  - repeat trop pending --> at baseline  - monitor tele     Acute on chronic diastolic CHF (congestive heart failure)  - last echo with EF 60-65%, GII LV DD  - BNP significantly elevated to 1165  - appears overloaded on exam with BLE edema and increased weight gain over the past few days despite increasing her home lasix from daily to BID  - CXR consistent with pulmonary edema and small bilateral pleural effusions   - continue BB  - Monitor on telemetry.   - Patient is on CHF pathway.  Monitor strict Is&Os and daily weights.  Place on fluid restriction of 1.5 L.   - start IV diuresis with lasix 40 mg BID, increased to 80mg IV BID  - goal UOP of -2L per day   - diuresis held originally for concerns of over diuresis with Cr bump  - POCUS performed by attending found patient to be fluid overloaded  - initiate 120mg IV lasix BID - holding for now  - repeat echo noted below    Results for orders placed during the hospital encounter of 05/07/24    Echo    Interpretation Summary    Left Ventricle: The left ventricle is normal in size.  Ventricular mass is normal. Normal wall thickness. Normal wall motion. There is normal systolic function. Ejection fraction by visual approximation is 55%. Unable to assess diastolic function due to atrial fibrillation.    Right Ventricle: Normal right ventricular cavity size. Wall thickness is normal. Systolic function is mildly reduced.    Aortic Valve: The aortic valve is a trileaflet valve. There is moderate aortic valve sclerosis. There is annular calcification present. Mildly restricted motion. No stenosis.    Tricuspid Valve: There is mild to moderate regurgitation.    Pulmonary Artery: The estimated pulmonary artery systolic pressure is 44 mmHg.    IVC/SVC: Elevated venous pressure at 15 mmHg.    Chronic respiratory failure with hypoxia  - chronic and stable on home 2L    Centrilobular emphysema  Patient's COPD is controlled currently.  Patient is currently off COPD Pathway. Continue scheduled inhalers Supplemental oxygen and monitor respiratory status closely.   - satting well on home 2L NC    Hyperlipidemia  - continue statin     Paroxysmal atrial fibrillation  Patient with Paroxysmal (<7 days) atrial fibrillation which is controlled currently with Beta Blocker. Patient is currently in atrial fibrillation.[id number] Score: 3. Anticoagulation indicated. Anticoagulation done with eliquis 5 mg BID  .    Essential hypertension  - controlled on admit  - continue toprol 100 mg and losartan 50 mg daily   - holding home losartan  - IV diuresis as above  - patient with soft pressures  - patient asymptomatic, monitor closely      VTE Risk Mitigation (From admission, onward)           Ordered     apixaban tablet 2.5 mg  2 times daily         05/08/24 0050     IP VTE HIGH RISK PATIENT  Once         05/07/24 2029     Reason for No Pharmacological VTE Prophylaxis  Once        Question:  Reasons:  Answer:  Already adequately anticoagulated on oral Anticoagulants    05/07/24 2029     Place sequential compression device   Until discontinued         05/07/24 2024                    Discharge Planning   NOHEMI: 5/17/2024     Code Status: Full Code   Is the patient medically ready for discharge?:     Reason for patient still in hospital (select all that apply): Patient trending condition, Laboratory test, and Consult recommendations  Discharge Plan A: Home Health   Discharge Delays: None known at this time              Adina Wolff PA-C  Department of Hospital Medicine   [facility] - Cardiology Stepdown

## 2024-05-16 NOTE — PT/OT/SLP PROGRESS
Physical Therapy Treatment    Patient Name:  Misa Barajas   MRN:  [id number]    Recommendations:     Discharge Recommendations: Low Intensity Therapy  Discharge Equipment Recommendations: walker, rolling  Barriers to discharge: Decreased caregiver support    Assessment:     Misa Barajas is a 78 y.o. female admitted with a medical diagnosis of Acute renal failure superimposed on stage 3b chronic kidney disease.  She presents with the following impairments/functional limitations: weakness, impaired endurance, impaired functional mobility, gait instability, decreased safety awareness, impaired cardiopulmonary response to activity.    Rehab Prognosis: Good; patient would benefit from acute skilled PT services to address these deficits and reach maximum level of function.    Recent Surgery: * No surgery found *      Plan:     During this hospitalization, patient to be seen 3 x/week to address the identified rehab impairments via gait training, therapeutic activities, therapeutic exercises, neuromuscular re-education and progress toward the following goals:    Plan of Care Expires:  05/25/24    Subjective     Chief Complaint: fatigue while ambulating  Patient/Family Comments/goals: to get better  Pain/Comfort:  Pain Rating 1: 0/10      Objective:     Communicated with NSG prior to session.  Patient found HOB elevated with oxygen, telemetry upon PT entry to room.     General Precautions: Standard, fall  Orthopedic Precautions: N/A  Braces: N/A  Respiratory Status: nasal cannula      Functional Mobility:  Bed Mobility:     Supine to Sit: stand by assistance and HOB elevated  Sit to Supine: contact guard assistance  Transfers:     Sit to Stand:  stand by assistance with rolling walker  Gait: 24 feet with RW CGA with REED and fatigue reported requiring return to room.        AM-PAC 6 CLICK MOBILITY  Turning over in bed (including adjusting bedclothes, sheets and blankets)?: 3  Sitting down on and standing up from a chair  with arms (e.g., wheelchair, bedside commode, etc.): 3  Moving from lying on back to sitting on the side of the bed?: 3  Moving to and from a bed to a chair (including a wheelchair)?: 3  Need to walk in hospital room?: 3  Climbing 3-5 steps with a railing?: 2  Basic Mobility Total Score: 17       Treatment & Education:  Pt performed x2 sts from EOB for better positioning in bed.   Bedside table in front of patient and area set up for function, convenience, and safety. RN aware of patient's mobility needs and status. Questions/concerns addressed within PTA scope of practice; patient  with no further questions. Time was provided for active listening, discussion of health disposition, and discussion of safe discharge.    Patient left HOB elevated with all lines intact and call button in reach..    GOALS:   Multidisciplinary Problems       Physical Therapy Goals          Problem: Physical Therapy    Goal Priority Disciplines Outcome Goal Variances Interventions   Physical Therapy Goal     PT, PT/OT Progressing     Description: Goals to be met by: 2024     Patient will increase functional independence with mobility by performin. Sit to stand transfer with Stand-by Assistance. -In progress  2. Bed to chair transfer with Stand-by Assistance using Rolling Walker. -In progress  3. Gait x 25 feet with Stand-by Assistance using Rolling Walker. -In progress   4. Lower extremity exercise program x20 reps per handout, with supervision. -In progress                       Time Tracking:     PT Received On: 24  PT Start Time: 908     PT Stop Time: 931  PT Total Time (min): 23 min     Billable Minutes: Gait Training 13 and Therapeutic Activity 10    Treatment Type: Treatment  PT/PTA: PTA     Number of PTA visits since last PT visit: 3     2024   Is This A New Presentation, Or A Follow-Up?: Skin Lesion How Severe Is Your Skin Lesion?: mild Additional History: Bx near site, “cloudy in appearance” What Type Of Note Output Would You Prefer (Optional)?: Bullet Format Has Your Skin Lesion Been Treated?: not been treated

## 2024-05-16 NOTE — PLAN OF CARE
Plan of care discussed with patient. Patient is free of fall/trauma/injury. Denies CP, SOB, or pain/discomfort. All questions addressed. Will continue to monitor    Problem: Comorbidity Management  Goal: Maintenance of COPD Symptom Control  Outcome: Progressing  Goal: Blood Pressure in Desired Range  Outcome: Progressing     Problem: Pulmonary Impairment  Goal: Improved Activity Tolerance  Outcome: Progressing  Goal: Effective Airway Clearance  Outcome: Progressing  Goal: Optimal Gas Exchange  Outcome: Progressing     Problem: Skin or Soft Tissue Infection  Goal: Absence of Infection Signs and Symptoms  Outcome: Progressing     Problem: Electrolyte Imbalance  Goal: Electrolyte Balance  Outcome: Progressing

## 2024-05-16 NOTE — ASSESSMENT & PLAN NOTE
Patient with Paroxysmal (<7 days) atrial fibrillation which is controlled currently with Beta Blocker. Patient is currently in atrial fibrillation.[id number] Score: 3. Anticoagulation indicated. Anticoagulation done with eliquis 5 mg BID  .

## 2024-05-16 NOTE — PROGRESS NOTES
[facility] - [facility] Step[facility]  Nephrology  Progress Note    Patient Name: Misa Barajas  MRN: [id number]  Admission Date: 5/7/2024  Hospital Length of Stay: 8 days  Attending Provider: Faisal Nixon MD   Primary Care Physician: Celia Carlisle MD  Principal Problem:Acute renal failure superimposed on stage 3b chronic kidney disease    Subjective:     HPI: Ms Barajas is an obese (BMI ~31) 78-year-old woman with HFpEF (most recent TTE with EF 55% with G2DD), mild to moderate tricuspid regurgitation, hypertension, HLD, paroxsymal atrial fibrillation on Eliquis, COPD on supplemental oxygen at home with 2 liters via nasal cannula, atherosclerotic disease, chronic lymphedema, history of left sided breast cancer, CKD IIIb (baseline creatinine ~1.5) as well as several other co-morbid conditions who [place] [facility] [place] with acute on chronic CHF exacerbation, MARQUEZ with creatinine 1.8, UTI growing S. aureus & Enterococcus faecalis and concern for right lower extremity cellulitis. She was management in the usually fashion with IV diuresis with Lasix in addition to receiving several different antibiotics including ampicillin, Augmentin, Rocephin, cephalexin, Bactrim and vancomycin. Her admission has been complicated by urinary retention necessitating Muniz catheter placement, hypotension (lowest document BP 87/54 mmHg) and worsening renal function for which Nephrology has been consulted. Creatinine trend this admission is as follows 1.8 > 1.5 > 1.2 > 1.3 > 1.9 > 1.8 > 1.9 > 2.1. Mcgarry stain was noted to be positive for occasional eosinophils.  Urinary sediment was assessed which demonstrated innumerable WBCs with some within waxy casts, non dysmorphic RBCs, waxy casts and occasional bacteria.     Interval History: Patient seen and examined. No acute events overnight. Afebrile with pulse ranging from 100-70s bpm. Systolic blood pressures ranging from 110-80s mmHg. She is saturating +90% on 2 liters via nasal  cannula. Documented UOP of 650 mL in the last 24 hours. Renal function largely stable today.    Review of patient's allergies indicates:   Allergen Reactions    Adhesive Rash     Pt states she removed a LATEX bandaid and the skin beneath was swollen and red. No other latex causes a reaction.     Current Facility-Administered Medications   Medication Frequency    acetaminophen tablet 1,000 mg Q8H PRN    acetaminophen tablet 650 mg Q4H PRN    albuterol-ipratropium 2.5 mg-0.5 mg/3 mL nebulizer solution 3 mL Q4H PRN    apixaban tablet 2.5 mg BID    aspirin EC tablet 81 mg Daily    atorvastatin tablet 20 mg Daily    bisacodyL suppository 10 mg Daily PRN    dextrose 10% bolus 125 mL 125 mL PRN    dextrose 10% bolus 250 mL 250 mL PRN    fluticasone furoate-vilanteroL 100-25 mcg/dose diskus inhaler 1 puff Daily    glucagon (human recombinant) injection 1 mg PRN    glucose chewable tablet 16 g PRN    glucose chewable tablet 24 g PRN    melatonin tablet 6 mg Nightly PRN    methocarbamoL tablet 500 mg QID PRN    metoprolol succinate (TOPROL-XL) 24 hr tablet 100 mg Daily    miconazole NITRATE 2 % top powder BID    ondansetron disintegrating tablet 8 mg Q8H PRN    polyethylene glycol packet 17 g Daily    potassium bicarbonate disintegrating tablet 20 mEq Once    promethazine tablet 25 mg Q6H PRN    sodium chloride 0.9% flush 10 mL PRN    tamsulosin 24 hr capsule 0.4 mg Daily       Objective:     Vital Signs (Most Recent):  Temp: 97.6 °F (36.4 °C) (05/16/24 0807)  Pulse: 94 (05/16/24 0807)  Resp: 18 (05/16/24 0807)  BP: (!) 89/51 (05/16/24 0807)  SpO2: (!) 90 % (05/16/24 0807) Vital Signs (24h Range):  Temp:  [97.3 °F (36.3 °C)-97.9 °F (36.6 °C)] 97.6 °F (36.4 °C)  Pulse:  [] 94  Resp:  [16-19] 18  SpO2:  [90 %-96 %] 90 %  BP: ()/(51-65) 89/51     Weight: 79.8 kg (175 lb 14.8 oz) (05/15/24 1043)  Body mass index is 30.2 kg/m².  Body surface area is 1.9 meters squared.    I/O last 3 completed shifts:  In: 240  [P.O.:240]  Out: 1050 [Urine:1050]     Physical Exam  Vitals and nursing note reviewed.   Constitutional:       General: She is awake. She is not in acute distress.     Appearance: She is well-developed. She is obese. She is ill-appearing. She is not diaphoretic.      Interventions: Nasal cannula in place.   HENT:      Head: Normocephalic and atraumatic.      Right Ear: External ear normal.      Left Ear: External ear normal.      Nose:      Comments: Nasal cannula in place.     Mouth/Throat:      Mouth: Mucous membranes are moist.      Pharynx: Oropharynx is clear. No oropharyngeal exudate or posterior oropharyngeal erythema.   Eyes:      General: No scleral icterus.        Right eye: No discharge.         Left eye: No discharge.      Extraocular Movements: Extraocular movements intact.      Conjunctiva/sclera: Conjunctivae normal.   Cardiovascular:      Rate and Rhythm: Normal rate.      Heart sounds: Murmur heard.   Pulmonary:      Effort: Pulmonary effort is normal. No respiratory distress.      Breath sounds: No wheezing, rhonchi or rales.   Abdominal:      General: Bowel sounds are normal. There is no distension.      Palpations: Abdomen is soft.      Tenderness: There is no abdominal tenderness.   Genitourinary:     Comments: Muniz catheter in place.  Musculoskeletal:      Cervical back: Neck supple.      Right lower leg: Edema present.      Left lower leg: Edema present.   Skin:     General: Skin is warm and dry.      Coloration: Skin is not jaundiced.      Findings: Bruising, erythema and rash present.   Neurological:      General: No focal deficit present.      Mental Status: She is alert. Mental status is at baseline.      Cranial Nerves: No cranial nerve deficit.      Motor: No weakness.   Psychiatric:         Mood and Affect: Mood normal.         Behavior: Behavior normal. Behavior is cooperative.          Significant Labs:  BMP:   Recent Labs   Lab 05/16/24  0230   GLU 85      K 3.8       CO2 25   BUN 50*   CREATININE 2.5*   CALCIUM 10.0   MG 2.1     CBC:   Recent Labs   Lab 05/16/24  0230   WBC 5.98   RBC 3.66*   HGB 11.2*   HCT 33.7*      MCV 92   MCH 30.6   MCHC 33.2     CMP:   Recent Labs   Lab 05/16/24  0230   GLU 85   CALCIUM 10.0      K 3.8   CO2 25      BUN 50*   CREATININE 2.5*     Microbiology Results (last 7 days)       Procedure Component Value Units Date/Time    Blood culture [[id number]] Collected: 05/09/24 1433    Order Status: Completed Specimen: Blood Updated: 05/14/24 1612     Blood Culture, Routine No growth after 5 days.    Blood culture [[id number]] Collected: 05/09/24 1434    Order Status: Completed Specimen: Blood Updated: 05/14/24 1612     Blood Culture, Routine No growth after 5 days.    Blood culture [[id number]] Collected: 05/07/24 2251    Order Status: Completed Specimen: Blood from Peripheral, Forearm, Right Updated: 05/13/24 0612     Blood Culture, Routine No growth after 5 days.    Blood culture [[id number]]  (Abnormal) Collected: 05/07/24 2251    Order Status: Completed Specimen: Blood from Peripheral, Hand, Right Updated: 05/11/24 1057     Blood Culture, Routine Gram stain aer bottle: Gram positive cocci in clusters resembling Staph      Results called to and read back by:Josephine Minor LPN 05/09/2024  13:59      MICROCOCCUS SPECIES  Organism is a probable contaminant      Urine culture [[id number]]  (Abnormal)  (Susceptibility) Collected: 05/08/24 0242    Order Status: Completed Specimen: Urine Updated: 05/10/24 1241     Urine Culture, Routine STAPHYLOCOCCUS AUREUS  > 100,000 cfu/ml        ENTEROCOCCUS FAECALIS  50,000 - 99,999 cfu/ml      Narrative:      Specimen Source->Urine    MRSA/SA Rapid ID by PCR from Blood culture [[id number]] Collected: 05/09/24 1359    Order Status: Completed Updated: 05/09/24 2127     Staph aureus ID by PCR Negative     Methicillin Resistant ID by PCR Negative          Specimen (24h ago, onward)      None           Significant Imaging:  I have reviewed all imagining in the last 24 hours.  Assessment/Plan:     Cardiac/Vascular  Essential hypertension  - management per primary team  - currently on Toprol- mg daily    Renal/  * Acute renal failure superimposed on stage 3b chronic kidney disease  Urinary retention  78-year-old woman with HFpEF (most recent TTE with EF 55% with G2DD), mild to moderate tricuspid regurgitation, HTN, paroxsymal atrial fibrillation on Eliquis, COPD on 2 liters O2, and CKD IIIb (baseline creatinine ~1.5) [place] [facility] [place] with acute on chronic CHF exacerbation, MARQUEZ with creatinine 1.8, UTI growing S. aureus & Enterococcus faecalis and concern for right lower extremity cellulitis. She was management in the usually fashion with IV diuresis with Lasix in addition to receiving several different antibiotics including ampicillin, Augmentin, Rocephin, cephalexin, DS Bactrim and vancomycin. Her admission has been complicated by urinary retention necessitating Muniz catheter placement, hypotension (lowest document BP 87/54 mmHg) and worsening renal function for which Nephrology has been consulted. Creatinine trend this admission is as follows 1.8 > 1.5 > 1.2 > 1.3 > 1.9 > 1.8 > 1.9 > 2.1. Mcgarry stain was noted to be positive for occasional eosinophils.  Urinary sediment was assessed which demonstrated innumerable WBCs with some within waxy casts, non dysmorphic RBCs, waxy casts and occasional bacteria. UPCR only 0.2 and retroperitoneal US with changes consistent with chronic medical renal disease, no hydronephrosis but 3 mm nonobstructing right renal stone was noted.    Plan/Recommendations:  - etiology of MARQUEZ not clear, could be pre-renal in the setting of diuresis versus AIN with multiple classes of antibiotics given (including DS Bactrim with trimethoprim being known to inhibit creatinine secretion at organic cation transporters within the proximal tubule)  - no IVFs or diuresis today, will  reassess urinary sediment today  - agree with stopping antibiotics for now  - would keep Muniz in place in light of urinary retention  - monitor renal function and UOP over next 12 hours and reassess   - daily RFP and magnesium levels  - renal diet/tube feeds when not NPO with volume restriction per primary team  - strict I/O's and daily weights  - renally all dose medications to eGFR   - avoid nephrotoxic agents wean feasible (i.e. NSAIDs, intra-arterial contrast, supra-therapeutic vancomycin levels, etc.)  - no acute indications for RRT at this time however will continue to monitor closely    Thank you for your consult. I will follow-up with patient. Please contact us if you have any additional questions.    Cal Iniguez MD  Nephrology  [facility] - [facility] Step[facility]

## 2024-05-16 NOTE — ASSESSMENT & PLAN NOTE
78-year-old woman with HFpEF (most recent TTE with EF 55% with G2DD), mild to moderate tricuspid regurgitation, HTN, paroxsymal atrial fibrillation on Eliquis, COPD on 2 liters O2, and CKD IIIb (baseline creatinine ~1.5) admitted to [facility] [place] on 5/7 with acute on chronic CHF exacerbation, MARQUEZ with creatinine 1.8, UTI growing S. aureus & Enterococcus faecalis and concern for right lower extremity cellulitis. She was management in the usually fashion with IV diuresis with Lasix in addition to receiving several different antibiotics including ampicillin, Augmentin, Rocephin, cephalexin, DS Bactrim and vancomycin. Her admission has been complicated by urinary retention necessitating Muniz catheter placement, hypotension (lowest document BP 87/54 mmHg) and worsening renal function for which Nephrology has been consulted. Creatinine trend this admission is as follows 1.8 > 1.5 > 1.2 > 1.3 > 1.9 > 1.8 > 1.9 > 2.1. Mcgarry stain was noted to be positive for occasional eosinophils.  Urinary sediment was assessed which demonstrated innumerable WBCs with some within waxy casts, non dysmorphic RBCs, waxy casts and occasional bacteria. UPCR only 0.2 and retroperitoneal US with changes consistent with chronic medical renal disease, no hydronephrosis but 3 mm nonobstructing right renal stone was noted.    Plan/Recommendations:  - etiology of MARQUEZ not clear, could be pre-renal in the setting of diuresis versus AIN with multiple classes of antibiotics given (including DS Bactrim with trimethoprim being known to inhibit creatinine secretion at organic cation transporters within the proximal tubule)  - no IVFs or diuresis today, will reassess urinary sediment today  - agree with stopping antibiotics for now  - would keep Muniz in place in light of urinary retention  - monitor renal function and UOP over next 12 hours and reassess   - daily RFP and magnesium levels  - renal diet/tube feeds when not NPO with volume restriction per primary  team  - strict I/O's and daily weights  - renally all dose medications to eGFR   - avoid nephrotoxic agents wean feasible (i.e. NSAIDs, intra-arterial contrast, supra-therapeutic vancomycin levels, etc.)  - no acute indications for RRT at this time however will continue to monitor closely

## 2024-05-16 NOTE — PROGRESS NOTES
Pt arrived to room 355. Pt oriented to room and unit. Pt on telemetry. Pt on 2L oxygen via NC. Pt has orosco cath in; no c/o of discomfort. Bed in lowest position, locked, call light and personal belongings in reach, siderails up x3. Pt instructed to use call light for assistance. Pt has no c/o of pain or discomfort. VS taken and documented. Assessment done. Will continue plan of care.      05/15/24 2345   Vital Signs   Temp 97.3 °F (36.3 °C)   Temp Source Temporal   Pulse 81   Heart Rate Source Monitor   Resp 17   SpO2 (!) 92 %   Flow (L/min) (Oxygen Therapy) 2   Device (Oxygen Therapy) nasal cannula   /65   MAP (mmHg) 81   BP Location Right leg   BP Method Automatic   Patient Position Lying     Nurses Note -- 4 Eyes      5/15/2024   11:30 PM      Skin assessed during: Transfer      [] No Altered Skin Integrity Present    []Prevention Measures Documented      [x] Yes- Altered Skin Integrity Present or Discovered   [] LDA Added if Not in Epic (Describe Wound)   [] New Altered Skin Integrity was Present on Admit and Documented in LDA   [] Wound Image Taken    Wound Care Consulted? Yes    Attending Nurse:  JYOTI Garcia    Second RN/Staff Member:   JYOTI Theodore

## 2024-05-16 NOTE — NURSING
Report recd care assumed . AAOx4 oxynen @ 2 liters per nasal cannula , OV heplock rt forearm intact . Per previous RN patient will be trenaferred to CSU , no bed assigned .

## 2024-05-16 NOTE — PLAN OF CARE
Urology appointment scheduled 05/21 at 3:00pm  Gen Cards appointment 5/22 at 2:30pm (prev scheduled)  Hospital f/u appointment 05/27 at 2:40pm (prev Scheduled)              Maki Acevedo W  Case Management  [phone]

## 2024-05-16 NOTE — PT/OT/SLP PROGRESS
Occupational Therapy   Treatment    Name: Misa Barajas  MRN: [id number]  Admitting Diagnosis:  Acute renal failure superimposed on stage 3b chronic kidney disease       Recommendations:     Discharge Recommendations: Low Intensity Therapy  Pt reports she owns RW and BSC and is on home oxygen support.   Assessment:     Misa Barajas is a 78 y.o. female with a medical diagnosis of Acute renal failure superimposed on stage 3b chronic kidney disease.  Performance deficits affecting function are weakness, impaired endurance, impaired self care skills, impaired functional mobility, gait instability, impaired balance, decreased upper extremity function, decreased safety awareness, pain, decreased ROM. Pt tolerated session well with good effort and performance     Rehab Prognosis:  Good; patient would benefit from acute skilled OT services to address these deficits and reach maximum level of function.       Plan:     Patient to be seen 3 x/week to address the above listed problems via self-care/home management, therapeutic activities, therapeutic exercises  Plan of Care Expires: 06/14/24  Plan of Care Reviewed with: patient    Subjective     Pt agreeable to therapy.   Pt acknowledges minimal SOB following activity, but able to recover with rest break.     Pain/Comfort:  Pain Rating 1: 0/10    Objective:     Communicated with: nsg prior to session.  Patient found in bed with tele, orosco and 2 LPM oxygen via NC   General Precautions: Standard, fall      Occupational Performance:     Bed Mobility:    Supine>sit with SBA   Sit>supine with MIN A     Functional Mobility/Transfers:  Sit>stand with SBA  Functional mobility with SBA with RW     Activities of Daily Living:  Feeding: independent  G/H standing with close SBA for oral care. Pt tolerated standing approx 4 min for oral care. Unilateral UE support on RW with mild postural sway noted. No LOB     AMPAC 6 Click ADL: 18    Treatment & Education:  Pt demo SBA for postural  control seated EOB. Slow transitions completed with good effort and no reports of pain. Minimal SOB noted after activity with 2 LPM oxygen in place, but was able to recover after rest breaks.   Pt declined OOB to chair stating the chair was uncomfortable. Education provided re: impact of prolonged immobility and importance of OOB activity. Education provided to pt and nsg who arrived to room for OOB for next meal to increase endurance for functional activity.   Education provided re: role of OT and safety with functional mobility/ADl skills.       Patient left HOB elevated with all lines intact, call button in reach, and nsg notified    GOALS:   Multidisciplinary Problems       Occupational Therapy Goals          Problem: Occupational Therapy    Goal Priority Disciplines Outcome Interventions   Occupational Therapy Goal     OT, PT/OT Progressing    Description: Goals to be met by: 6/14/24     Patient will increase functional independence with ADLs by performing:    UE Dressing with [place].  LE Dressing with [place].  Grooming while standing at sink with Set-up Assistance and Supervision.  Toileting from toilet with Modified [place] for hygiene and clothing management.   All functional transfers performed c/ Supervision                         Time Tracking:     OT Date of Treatment: 05/16/24  OT Start Time: 1210  OT Stop Time: 1235  OT Total Time (min): 25 min    Billable Minutes:Self Care/Home Management 13  Therapeutic Activity 12    OT/MARYA: OT          5/16/2024

## 2024-05-17 LAB
ANION GAP SERPL CALC-SCNC: 10 MMOL/L (ref 8–16)
BASOPHILS # BLD AUTO: 0.08 K/UL (ref 0–0.2)
BASOPHILS NFR BLD: 1.3 % (ref 0–1.9)
BUN SERPL-MCNC: 54 MG/DL (ref 8–23)
CALCIUM SERPL-MCNC: 9.9 MG/DL (ref 8.7–10.5)
CHLORIDE SERPL-SCNC: 105 MMOL/L (ref 95–110)
CO2 SERPL-SCNC: 24 MMOL/L (ref 23–29)
CREAT SERPL-MCNC: 2.3 MG/DL (ref 0.5–1.4)
DIFFERENTIAL METHOD BLD: ABNORMAL
EOSINOPHIL # BLD AUTO: 0.2 K/UL (ref 0–0.5)
EOSINOPHIL NFR BLD: 3.5 % (ref 0–8)
ERYTHROCYTE [DISTWIDTH] IN BLOOD BY AUTOMATED COUNT: 13.3 % (ref 11.5–14.5)
EST. GFR  (NO RACE VARIABLE): 21 ML/MIN/1.73 M^2
GLUCOSE SERPL-MCNC: 95 MG/DL (ref 70–110)
HCT VFR BLD AUTO: 35 % (ref 37–48.5)
HGB BLD-MCNC: 11 G/DL (ref 12–16)
IMM GRANULOCYTES # BLD AUTO: 0.02 K/UL (ref 0–0.04)
IMM GRANULOCYTES NFR BLD AUTO: 0.3 % (ref 0–0.5)
LYMPHOCYTES # BLD AUTO: 1.3 K/UL (ref 1–4.8)
LYMPHOCYTES NFR BLD: 20.6 % (ref 18–48)
MAGNESIUM SERPL-MCNC: 2.1 MG/DL (ref 1.6–2.6)
MCH RBC QN AUTO: 29.8 PG (ref 27–31)
MCHC RBC AUTO-ENTMCNC: 31.4 G/DL (ref 32–36)
MCV RBC AUTO: 95 FL (ref 82–98)
MONOCYTES # BLD AUTO: 0.7 K/UL (ref 0.3–1)
MONOCYTES NFR BLD: 11.3 % (ref 4–15)
NEUTROPHILS # BLD AUTO: 3.9 K/UL (ref 1.8–7.7)
NEUTROPHILS NFR BLD: 63 % (ref 38–73)
NRBC BLD-RTO: 0 /100 WBC
PHOSPHATE SERPL-MCNC: 2.8 MG/DL (ref 2.7–4.5)
PLATELET # BLD AUTO: 217 K/UL (ref 150–450)
PMV BLD AUTO: 9.8 FL (ref 9.2–12.9)
POTASSIUM SERPL-SCNC: 4 MMOL/L (ref 3.5–5.1)
RBC # BLD AUTO: 3.69 M/UL (ref 4–5.4)
SODIUM SERPL-SCNC: 139 MMOL/L (ref 136–145)
WBC # BLD AUTO: 6.2 K/UL (ref 3.9–12.7)

## 2024-05-17 PROCEDURE — 94761 N-INVAS EAR/PLS OXIMETRY MLT: CPT

## 2024-05-17 PROCEDURE — 94640 AIRWAY INHALATION TREATMENT: CPT

## 2024-05-17 PROCEDURE — 84100 ASSAY OF PHOSPHORUS: CPT

## 2024-05-17 PROCEDURE — 25000003 PHARM REV CODE 250: Performed by: PHYSICIAN ASSISTANT

## 2024-05-17 PROCEDURE — 99232 SBSQ HOSP IP/OBS MODERATE 35: CPT | Mod: ,,, | Performed by: INTERNAL MEDICINE

## 2024-05-17 PROCEDURE — 85025 COMPLETE CBC W/AUTO DIFF WBC: CPT

## 2024-05-17 PROCEDURE — 83735 ASSAY OF MAGNESIUM: CPT

## 2024-05-17 PROCEDURE — 27000221 HC OXYGEN, UP TO 24 HOURS

## 2024-05-17 PROCEDURE — 25000003 PHARM REV CODE 250

## 2024-05-17 PROCEDURE — 20600001 HC STEP DOWN PRIVATE ROOM

## 2024-05-17 PROCEDURE — 99900035 HC TECH TIME PER 15 MIN (STAT)

## 2024-05-17 PROCEDURE — 36415 COLL VENOUS BLD VENIPUNCTURE: CPT

## 2024-05-17 PROCEDURE — 80048 BASIC METABOLIC PNL TOTAL CA: CPT

## 2024-05-17 RX ADMIN — APIXABAN 2.5 MG: 2.5 TABLET, FILM COATED ORAL at 10:05

## 2024-05-17 RX ADMIN — METOPROLOL SUCCINATE 100 MG: 100 TABLET, EXTENDED RELEASE ORAL at 09:05

## 2024-05-17 RX ADMIN — TAMSULOSIN HYDROCHLORIDE 0.4 MG: 0.4 CAPSULE ORAL at 09:05

## 2024-05-17 RX ADMIN — ATORVASTATIN CALCIUM 20 MG: 20 TABLET, FILM COATED ORAL at 09:05

## 2024-05-17 RX ADMIN — APIXABAN 2.5 MG: 2.5 TABLET, FILM COATED ORAL at 09:05

## 2024-05-17 RX ADMIN — FLUTICASONE FUROATE AND VILANTEROL TRIFENATATE 1 PUFF: 100; 25 POWDER RESPIRATORY (INHALATION) at 08:05

## 2024-05-17 RX ADMIN — MICONAZOLE NITRATE 2 % TOPICAL POWDER: at 10:05

## 2024-05-17 RX ADMIN — ASPIRIN 81 MG: 81 TABLET, COATED ORAL at 09:05

## 2024-05-17 NOTE — PLAN OF CARE
CHW met with patient/family at bedside. Patient experience rounding completed and reviewed the following.     Do you know your discharge plan? Yes or No,    If yes, what is the plan? (Home, Home Health, Rehab, SNF, LTAC, or Other) Yes Home w/ HH    Have you discussed your needs and preferences with your SW/CM? Yes or No  Yes Home w/ HH    If you are discharging home, do you have help at home? Yes or No Yes (family)    Do you think you will need help additional at home at discharge? Yes or No   No    Do you currently have difficulty keeping up with bills, affording medicine or buying food? Yes or No No    Assigned SW/CM notified of any patient/family needs or concerns. Appropriate resources provided to address patient's needs.  Rocio Acevedo CHW  Case management  [phone]

## 2024-05-17 NOTE — PROGRESS NOTES
[facility] - Cardiology Stepdown  Nephrology  Progress Note    Patient Name: Misa Barajas  MRN: [id number]  Admission Date: 5/7/2024  Hospital Length of Stay: 9 days  Attending Provider: Faisal Nixon MD   Primary Care Physician: Celia Carlisle MD  Principal Problem:Acute renal failure superimposed on stage 3b chronic kidney disease    Subjective:     HPI: Ms Barajas is an obese (BMI ~31) 78-year-old woman with HFpEF (most recent TTE with EF 55% with G2DD), mild to moderate tricuspid regurgitation, hypertension, HLD, paroxsymal atrial fibrillation on Eliquis, COPD on supplemental oxygen at home with 2 liters via nasal cannula, atherosclerotic disease, chronic lymphedema, history of left sided breast cancer, CKD IIIb (baseline creatinine ~1.5) as well as several other co-morbid conditions who was admitted to [facility] on 5/7 with acute on chronic CHF exacerbation, MARQUEZ with creatinine 1.8, UTI growing S. aureus & Enterococcus faecalis and concern for right lower extremity cellulitis. She was management in the usually fashion with IV diuresis with Lasix in addition to receiving several different antibiotics including ampicillin, Augmentin, Rocephin, cephalexin, Bactrim and vancomycin. Her admission has been complicated by urinary retention necessitating Muniz catheter placement, hypotension (lowest document BP 87/54 mmHg) and worsening renal function for which Nephrology has been consulted. Creatinine trend this admission is as follows 1.8 > 1.5 > 1.2 > 1.3 > 1.9 > 1.8 > 1.9 > 2.1. Mcgarry stain was noted to be positive for occasional eosinophils.  Urinary sediment was assessed which demonstrated innumerable WBCs with some within waxy casts, non dysmorphic RBCs, waxy casts and occasional bacteria.     Interval History: Patient seen and examined. No acute events overnight. Afebrile with pulse ranging from 90-80s bpm. Systolic blood pressures ranging from 110-90s mmHg. She is saturating +92% on 2 liters via nasal  cannula. Documented UOP of 700 mL in the last 24 hours. Renal function largely stable today.    Review of patient's allergies indicates:   Allergen Reactions    Adhesive Rash     Pt states she removed a LATEX bandaid and the skin beneath was swollen and red. No other latex causes a reaction.     Current Facility-Administered Medications   Medication Frequency    acetaminophen tablet 1,000 mg Q8H PRN    acetaminophen tablet 650 mg Q4H PRN    albuterol-ipratropium 2.5 mg-0.5 mg/3 mL nebulizer solution 3 mL Q4H PRN    apixaban tablet 2.5 mg BID    aspirin EC tablet 81 mg Daily    atorvastatin tablet 20 mg Daily    bisacodyL suppository 10 mg Daily PRN    dextrose 10% bolus 125 mL 125 mL PRN    dextrose 10% bolus 250 mL 250 mL PRN    fluticasone furoate-vilanteroL 100-25 mcg/dose diskus inhaler 1 puff Daily    glucagon (human recombinant) injection 1 mg PRN    glucose chewable tablet 16 g PRN    glucose chewable tablet 24 g PRN    melatonin tablet 6 mg Nightly PRN    methocarbamoL tablet 500 mg QID PRN    metoprolol succinate (TOPROL-XL) 24 hr tablet 100 mg Daily    miconazole NITRATE 2 % top powder BID    ondansetron disintegrating tablet 8 mg Q8H PRN    polyethylene glycol packet 17 g Daily    potassium bicarbonate disintegrating tablet 20 mEq Once    promethazine tablet 25 mg Q6H PRN    sodium chloride 0.9% flush 10 mL PRN    tamsulosin 24 hr capsule 0.4 mg Daily       Objective:     Vital Signs (Most Recent):  Temp: 97.6 °F (36.4 °C) (05/17/24 0528)  Pulse: 90 (05/17/24 0528)  Resp: 17 (05/17/24 0528)  BP: 96/66 (05/17/24 0528)  SpO2: (!) 93 % (05/17/24 0528) Vital Signs (24h Range):  Temp:  [97.5 °F (36.4 °C)-97.8 °F (36.6 °C)] 97.6 °F (36.4 °C)  Pulse:  [26-94] 90  Resp:  [17-20] 17  SpO2:  [90 %-96 %] 93 %  BP: ()/(51-83) 96/66     Weight: 80.1 kg (176 lb 9.4 oz) (05/17/24 0587)  Body mass index is 30.31 kg/m².  Body surface area is 1.9 meters squared.    I/O last 3 completed shifts:  In: 400  [P.O.:400]  Out: 976 [Urine:975; Stool:1]     Physical Exam  Vitals and nursing note reviewed.   Constitutional:       General: She is awake. She is not in acute distress.     Appearance: She is well-developed. She is obese. She is ill-appearing. She is not diaphoretic.      Interventions: Nasal cannula in place.   HENT:      Head: Normocephalic and atraumatic.      Right Ear: External ear normal.      Left Ear: External ear normal.      Nose:      Comments: Nasal cannula in place.     Mouth/Throat:      Mouth: Mucous membranes are moist.      Pharynx: Oropharynx is clear. No oropharyngeal exudate or posterior oropharyngeal erythema.   Eyes:      General: No scleral icterus.        Right eye: No discharge.         Left eye: No discharge.      Extraocular Movements: Extraocular movements intact.      Conjunctiva/sclera: Conjunctivae normal.   Cardiovascular:      Rate and Rhythm: Normal rate.      Heart sounds: Murmur heard.   Pulmonary:      Effort: Pulmonary effort is normal. No respiratory distress.      Breath sounds: No wheezing, rhonchi or rales.   Abdominal:      General: Bowel sounds are normal. There is no distension.      Palpations: Abdomen is soft.      Tenderness: There is no abdominal tenderness.   Genitourinary:     Comments: Muniz catheter in place.  Musculoskeletal:      Cervical back: Neck supple.      Right lower leg: Edema present.      Left lower leg: Edema present.   Skin:     General: Skin is warm and dry.      Coloration: Skin is not jaundiced.      Findings: Bruising, erythema and rash present.   Neurological:      General: No focal deficit present.      Mental Status: She is alert. Mental status is at baseline.      Cranial Nerves: No cranial nerve deficit.      Motor: No weakness.   Psychiatric:         Mood and Affect: Mood normal.         Behavior: Behavior normal. Behavior is cooperative.          Significant Labs:  BMP:   Recent Labs   Lab 05/17/24  0258   GLU 95      K 4.0   CL  105   CO2 24   BUN 54*   CREATININE 2.3*   CALCIUM 9.9   MG 2.1     CBC:   Recent Labs   Lab 05/17/24  0258   WBC 6.20   RBC 3.69*   HGB 11.0*   HCT 35.0*      MCV 95   MCH 29.8   MCHC 31.4*     CMP:   Recent Labs   Lab 05/17/24  0258   GLU 95   CALCIUM 9.9      K 4.0   CO2 24      BUN 54*   CREATININE 2.3*     Microbiology Results (last 7 days)       Procedure Component Value Units Date/Time    Blood culture [[id number]] Collected: 05/09/24 1433    Order Status: Completed Specimen: Blood Updated: 05/14/24 1612     Blood Culture, Routine No growth after 5 days.    Blood culture [[id number]] Collected: 05/09/24 1434    Order Status: Completed Specimen: Blood Updated: 05/14/24 1612     Blood Culture, Routine No growth after 5 days.    Blood culture [[id number]] Collected: 05/07/24 2251    Order Status: Completed Specimen: Blood from Peripheral, Forearm, Right Updated: 05/13/24 0612     Blood Culture, Routine No growth after 5 days.    Blood culture [[id number]]  (Abnormal) Collected: 05/07/24 2251    Order Status: Completed Specimen: Blood from Peripheral, Hand, Right Updated: 05/11/24 1057     Blood Culture, Routine Gram stain aer bottle: Gram positive cocci in clusters resembling Staph      Results called to and read back by:Josephine Minor LPN 05/09/2024  13:59      MICROCOCCUS SPECIES  Organism is a probable contaminant      Urine culture [[id number]]  (Abnormal)  (Susceptibility) Collected: 05/08/24 0242    Order Status: Completed Specimen: Urine Updated: 05/10/24 1241     Urine Culture, Routine STAPHYLOCOCCUS AUREUS  > 100,000 cfu/ml        ENTEROCOCCUS FAECALIS  50,000 - 99,999 cfu/ml      Narrative:      Specimen Source->Urine          Specimen (24h ago, onward)      None          Significant Imaging:  I have reviewed all imagining in the last 24 hours.  Assessment/Plan:     Cardiac/Vascular  Essential hypertension  - management per primary team  - currently on Toprol- mg  daily    Renal/  * Acute renal failure superimposed on stage 3b chronic kidney disease  Urinary retention  78-year-old woman with HFpEF (most recent TTE with EF 55% with G2DD), mild to moderate tricuspid regurgitation, HTN, paroxsymal atrial fibrillation on Eliquis, COPD on 2 liters O2, and CKD IIIb (baseline creatinine ~1.5) admitted to [facility] on 5/7 with acute on chronic CHF exacerbation, MARQUEZ with creatinine 1.8, UTI growing S. aureus & Enterococcus faecalis and concern for right lower extremity cellulitis. She was management in the usually fashion with IV diuresis with Lasix in addition to receiving several different antibiotics including ampicillin, Augmentin, Rocephin, cephalexin, DS Bactrim and vancomycin. Her admission has been complicated by urinary retention necessitating Muniz catheter placement, hypotension (lowest document BP 87/54 mmHg) and worsening renal function for which Nephrology has been consulted. Creatinine trend this admission is as follows 1.8 > 1.5 > 1.2 > 1.3 > 1.9 > 1.8 > 1.9 > 2.1. Mcgarry stain was noted to be positive for occasional eosinophils.  Urinary sediment was assessed which demonstrated innumerable WBCs with some within waxy casts, non dysmorphic RBCs, waxy casts and occasional bacteria. UPCR only 0.2 and retroperitoneal US with changes consistent with chronic medical renal disease, no hydronephrosis but 3 mm nonobstructing right renal stone was noted.    Plan/Recommendations:  - etiology of MARQUEZ not clear, could be pre-renal in the setting of diuresis versus AIN with multiple classes of antibiotics given (including DS Bactrim with trimethoprim being known to inhibit creatinine secretion at organic cation transporters within the proximal tubule)  - no IVFs or diuresis today, encourage adequate PO intake  - agree with stopping antibiotics  - would keep Muniz in place in light of urinary retention  - monitor renal function and UOP over next 24 hours and reassess   - daily RFP and  magnesium levels  - renal diet/tube feeds when not NPO with volume restriction per primary team  - strict I/O's and daily weights  - renally all dose medications to eGFR   - avoid nephrotoxic agents wean feasible (i.e. NSAIDs, intra-arterial contrast, supra-therapeutic vancomycin levels, etc.)  - no acute indications for RRT at this time however will continue to monitor closely    Thank you for your consult. I will follow-up with patient. Please contact us if you have any additional questions.    Cal Iniguez MD  Nephrology  [facility] - Cardiology Stepdown

## 2024-05-17 NOTE — PROGRESS NOTES
[facility] - Cardiology [facility] Medicine  Progress Note    Patient Name: Misa Barajas  MRN: [id number]  Patient Class: IP- Inpatient   Admission Date: 5/7/2024  Length of Stay: 9 days  Attending Physician: Faisal Nixon MD  Primary Care Provider: Celia Carlisle MD        Subjective:     Principal Problem:Acute renal failure superimposed on stage 3b chronic kidney disease        HPI:  Misa Barajas is a 78 y.o. female with a PMHx of HTN, HLD, HFpEF, COPD on home O2 (2L NC baseline O2 sat 92%) and pAF presentswith increasing LE swelling.  She is gained 5 lb in the past 3 days despite increased Lasix use. She also endorses new redness and warmth with associated itching to her RLE. She has small pustules that she noticed to RLE a few days ago.  Denies fever, chills, chest pain, cough, SOB, abdominal pain, n/v/d, dysuria, headaches. Has chronic discoloration to BLEs without recent worsening.     ED: Afebrile . Hr 90s. Satting well on home 2L NC. CBC without leukocytosis or anemia. CMP notable for small MARQUEZ with Cr 1.8 (baseline 1.3) and BUN 26. BNP 1165. Trop 0.032 (0.028 at baseline). Lactic 1.2. CXR with Cardiomegaly with pulmonary vascular congestion and small bilateral pleural effusions, suggestive of pulmonary edema secondary to CHF. Given rocephin, toprol 100 mg and IV lasix 60 mg. Placed in observation for CHF exacerbation.     Overview/Hospital Course:  Misa Barajas is a 78 y.o. F who was admitted to  for further evaluation of acute on chronic diastolic CHF. Diuresing on IV lasix. Echo with EF 55%, unable to assess diastolic function due to a-fib, CVP 15mmHg. Cr 1.8 on admission most likely 2/2 to fluid overloaded, improved with diuresis. UA infectious, culture with staph aureus, enterococcus faecalis. IV rocephin switched to IV vancomycin. Transitioned to oral abx. Patient with urinary retention requiring straight cath. Initiated flomax and bladder scan q6hr. Continued retention -  orosco cath placed prior to discharge. Will set up urology follow-up OP. Cr bump of 1.9 after improvement with diuresis, originally thought to be due to possibly over-diuresis or abx. Unimproved with IVF. Mcgarry's stain with eosinophils, concerning for possible AIN - discontinued all abx. Nephrology consult placed for further recommendations. Holding all IVF and diuretics, measure I/Os, trend renal function. RP US with chronic medical renal disease, no hydronephrosis, 3mm non obstructing renal stone.     Interval History: NAEON. AFVSS. Patient feels well this morning - no complaints. She is euvolemic on exam. Cr 2.5 -> 2.3 today. Appreciate nephrology assistance.     Review of Systems   Constitutional:  Negative for activity change, chills and fever.   HENT:  Negative for trouble swallowing.    Eyes:  Negative for photophobia and visual disturbance.   Respiratory:  Negative for cough, chest tightness and shortness of breath (improved).         +REED   Cardiovascular:  Positive for leg swelling. Negative for chest pain and palpitations.   Gastrointestinal:  Negative for abdominal pain, constipation, diarrhea, nausea and vomiting.   Genitourinary:  Negative for dysuria, frequency and hematuria.   Musculoskeletal:  Negative for back pain, gait problem and neck pain.   Skin:  Positive for color change. Negative for rash and wound.   Neurological:  Negative for dizziness, syncope, speech difficulty and light-headedness.   Psychiatric/Behavioral:  Negative for agitation and confusion. The patient is not nervous/anxious.      Objective:     Vital Signs (Most Recent):  Temp: 97.7 °F (36.5 °C) (05/17/24 0820)  Pulse: 86 (05/17/24 0820)  Resp: (!) 21 (05/17/24 0820)  BP: (!) 106/53 (05/17/24 0820)  SpO2: 97 % (05/17/24 0820) Vital Signs (24h Range):  Temp:  [97.5 °F (36.4 °C)-97.8 °F (36.6 °C)] 97.7 °F (36.5 °C)  Pulse:  [26-92] 86  Resp:  [17-21] 21  SpO2:  [92 %-97 %] 97 %  BP: ()/(53-66) 106/53     Weight: 80.1 kg (176  lb 9.4 oz)  Body mass index is 30.31 kg/m².    Intake/Output Summary (Last 24 hours) at 5/17/2024 1140  Last data filed at 5/17/2024 0957  Gross per 24 hour   Intake 880 ml   Output 701 ml   Net 179 ml         Physical Exam  Vitals and nursing note reviewed.   Constitutional:       General: She is not in acute distress.     Appearance: She is well-developed.   HENT:      Head: Normocephalic and atraumatic.      Mouth/Throat:      Pharynx: No oropharyngeal exudate.   Eyes:      General: No scleral icterus.     Conjunctiva/sclera: Conjunctivae normal.   Cardiovascular:      Rate and Rhythm: Normal rate and regular rhythm.      Heart sounds: Normal heart sounds.   Pulmonary:      Effort: Pulmonary effort is normal. No respiratory distress.      Breath sounds: Normal breath sounds. No wheezing or rales.      Comments: NC in place  Abdominal:      General: Bowel sounds are normal. There is no distension.      Palpations: Abdomen is soft.      Tenderness: There is no abdominal tenderness.   Genitourinary:     Comments: Muniz in place  Musculoskeletal:         General: No tenderness. Normal range of motion.      Cervical back: Normal range of motion and neck supple.      Right lower leg: No edema.      Left lower leg: No edema.   Lymphadenopathy:      Cervical: No cervical adenopathy.   Skin:     General: Skin is warm and dry.      Capillary Refill: Capillary refill takes less than 2 seconds.      Findings: Erythema and rash present.   Neurological:      Mental Status: She is alert and oriented to person, place, and time.      Cranial Nerves: No cranial nerve deficit.      Sensory: No sensory deficit.      Coordination: Coordination normal.   Psychiatric:         Behavior: Behavior normal.         Thought Content: Thought content normal.         Judgment: Judgment normal.             Significant Labs: All pertinent labs within the past 24 hours have been reviewed.  CBC:   Recent Labs   Lab 05/16/24  0230 05/17/24  0258    WBC 5.98 6.20   HGB 11.2* 11.0*   HCT 33.7* 35.0*    217     CMP:   Recent Labs   Lab 05/16/24  0230 05/16/24  1155 05/17/24  0258    137 139   K 3.8 3.7 4.0    103 105   CO2 25 26 24   GLU 85 100 95   BUN 50* 49* 54*   CREATININE 2.5* 2.5* 2.3*   CALCIUM 10.0 10.1 9.9   ANIONGAP 10 8 10       Significant Imaging: I have reviewed all pertinent imaging results/findings within the past 24 hours.    Assessment/Plan:      * Acute renal failure superimposed on stage 3b chronic kidney disease  Acute interstitial nephritis   - Cr 1.8 on admit, baseline 1.3  - Estimated Creatinine Clearance: 20 mL/min (A) (based on SCr of 2.3 mg/dL (H)).  - Likely 2/2 hypervolemia in setting of CHF exacerbation. Should improve with IV diuresis  - renally dose meds  - avoid nephrotoxic insults  - monitor I/Os  - monitor with daily BMP, replete lytes if necessary  - improved to 1.2 with diuresis  - new Cr bump of 1.9 on am labs, not improving s/p fluids or further diuresis  - urine lytes pending, repeat UA pending, urine urea pending, wrights stain pending    - FeNa calculated at 0.5%   -wrights stain with eosinophils   -discontinued all abx  - nephrology consulted for rise in Cr and possible AIN, appreciate recommendations:  - would hold on further diuresis in addition would no give IVFs at this time  - will follow-up UPCR and retroperitoneal US  - agree with stopping antibiotics especially double strength Bactrim  - RP US with chronic medical renal disease, no hydronephrosis, 3 mm nonobstructing right renal stone.     Hypermagnesemia  RESOLVED   - mag 3.5 on am labs  - giving IVF and monitoring for improvement  - repeat pending - 2.2      Hypophosphatemia  RESOLVED  Patient has Abnormal Phosphorus: hypophosphatemia. Will continue to monitor electrolytes closely. Will replace the affected electrolytes and repeat labs to be done after interventions completed. The patient's phosphorus results have been reviewed and are  listed below.  Recent Labs   Lab 05/16/24  0230   PHOS 3.1          Urinary retention  - patient with urinary retention on admission requiring straight cath   - q6hr bladder scans ordered   - flomax initiated   - mobilize patient as she has not been out of bed since admission   -  flomax initiated   - orosco placed due to continued urinary retention  - schedule FU with urology at discharge  - plan for voiding trial prior to discharge      Acute cystitis  - UA infectious on admission   - Ucx currently growing staph aureus and enterococcus  - pending susceptibilities of culture  - switched IV rocephin to IV vanc  - bladder scan with 500mL, straight cath performed  - monitor patient for continued urinary retention  - switch to oral bactrim and augmentin  - concerns that abx could be causing MARQUEZ, transition to ampicillin and keflex  - 7d completed of abx, discontinued all due to AIN concerns    Wound of right lower extremity  - afebrile without leukocytosis  - blood cultures pending   - one culture with micrococcus, probable contaminant   - repeat bcx drawn - currently NGTD  - wound care consulted    Elevated troponin  - trop 0.032 on admit, baseline ~0.028  - patient denies chest pain  - likely elevated 2/2 CHF exacerbation  - repeat trop pending --> at baseline  - monitor tele     Acute on chronic diastolic CHF (congestive heart failure)  - last echo with EF 60-65%, GII LV DD  - BNP significantly elevated to 1165  - appears overloaded on exam with BLE edema and increased weight gain over the past few days despite increasing her home lasix from daily to BID  - CXR consistent with pulmonary edema and small bilateral pleural effusions   - continue BB  - Monitor on telemetry.   - Patient is on CHF pathway.  Monitor strict Is&Os and daily weights.  Place on fluid restriction of 1.5 L.   - start IV diuresis with lasix 40 mg BID, increased to 80mg IV BID  - goal UOP of -2L per day   - diuresis held originally for concerns of  over diuresis with Cr bump  - POCUS performed by attending found patient to be fluid overloaded  - initiate 120mg IV lasix BID - holding for now  - repeat echo noted below    Results for orders placed during the hospital encounter of 05/07/24    Echo    Interpretation Summary    Left Ventricle: The left ventricle is normal in size. Ventricular mass is normal. Normal wall thickness. Normal wall motion. There is normal systolic function. Ejection fraction by visual approximation is 55%. Unable to assess diastolic function due to atrial fibrillation.    Right Ventricle: Normal right ventricular cavity size. Wall thickness is normal. Systolic function is mildly reduced.    Aortic Valve: The aortic valve is a trileaflet valve. There is moderate aortic valve sclerosis. There is annular calcification present. Mildly restricted motion. No stenosis.    Tricuspid Valve: There is mild to moderate regurgitation.    Pulmonary Artery: The estimated pulmonary artery systolic pressure is 44 mmHg.    IVC/SVC: Elevated venous pressure at 15 mmHg.    Chronic respiratory failure with hypoxia  - chronic and stable on home 2L    Centrilobular emphysema  Patient's COPD is controlled currently.  Patient is currently off COPD Pathway. Continue scheduled inhalers Supplemental oxygen and monitor respiratory status closely.   - satting well on home 2L NC    Hyperlipidemia  - continue statin     Paroxysmal atrial fibrillation  Patient with Paroxysmal (<7 days) atrial fibrillation which is controlled currently with Beta Blocker. Patient is currently in atrial fibrillation.[id number] Score: 3. Anticoagulation indicated. Anticoagulation done with eliquis 5 mg BID  .    Essential hypertension  - controlled on admit  - continue toprol 100 mg and losartan 50 mg daily   - holding home losartan  - IV diuresis as above  - patient with soft pressures  - patient asymptomatic, monitor closely      VTE Risk Mitigation (From admission, onward)            Ordered     apixaban tablet 2.5 mg  2 times daily         05/08/24 0050     IP VTE HIGH RISK PATIENT  Once         05/07/24 2029     Reason for No Pharmacological VTE Prophylaxis  Once        Question:  Reasons:  Answer:  Already adequately anticoagulated on oral Anticoagulants    05/07/24 2029     Place sequential compression device  Until discontinued         05/07/24 2024                    Discharge Planning   NOHEMI: 5/17/2024     Code Status: Full Code   Is the patient medically ready for discharge?:     Reason for patient still in hospital (select all that apply): Patient trending condition, Laboratory test, Treatment, and Consult recommendations  Discharge Plan A: Home Health   Discharge Delays: None known at this time              Danielle Boss PA-C  Department of Hospital Medicine   [facility] - Cardiology [facility]

## 2024-05-17 NOTE — SUBJECTIVE & OBJECTIVE
Interval History: Patient seen and examined. No acute events overnight. Afebrile with pulse ranging from 90-80s bpm. Systolic blood pressures ranging from 110-90s mmHg. She is saturating +92% on 2 liters via nasal cannula. Documented UOP of 700 mL in the last 24 hours. Renal function largely stable today.    Review of patient's allergies indicates:   Allergen Reactions    Adhesive Rash     Pt states she removed a LATEX bandaid and the skin beneath was swollen and red. No other latex causes a reaction.     Current Facility-Administered Medications   Medication Frequency    acetaminophen tablet 1,000 mg Q8H PRN    acetaminophen tablet 650 mg Q4H PRN    albuterol-ipratropium 2.5 mg-0.5 mg/3 mL nebulizer solution 3 mL Q4H PRN    apixaban tablet 2.5 mg BID    aspirin EC tablet 81 mg Daily    atorvastatin tablet 20 mg Daily    bisacodyL suppository 10 mg Daily PRN    dextrose 10% bolus 125 mL 125 mL PRN    dextrose 10% bolus 250 mL 250 mL PRN    fluticasone furoate-vilanteroL 100-25 mcg/dose diskus inhaler 1 puff Daily    glucagon (human recombinant) injection 1 mg PRN    glucose chewable tablet 16 g PRN    glucose chewable tablet 24 g PRN    melatonin tablet 6 mg Nightly PRN    methocarbamoL tablet 500 mg QID PRN    metoprolol succinate (TOPROL-XL) 24 hr tablet 100 mg Daily    miconazole NITRATE 2 % top powder BID    ondansetron disintegrating tablet 8 mg Q8H PRN    polyethylene glycol packet 17 g Daily    potassium bicarbonate disintegrating tablet 20 mEq Once    promethazine tablet 25 mg Q6H PRN    sodium chloride 0.9% flush 10 mL PRN    tamsulosin 24 hr capsule 0.4 mg Daily       Objective:     Vital Signs (Most Recent):  Temp: 97.6 °F (36.4 °C) (05/17/24 0528)  Pulse: 90 (05/17/24 0528)  Resp: 17 (05/17/24 0528)  BP: 96/66 (05/17/24 0528)  SpO2: (!) 93 % (05/17/24 0528) Vital Signs (24h Range):  Temp:  [97.5 °F (36.4 °C)-97.8 °F (36.6 °C)] 97.6 °F (36.4 °C)  Pulse:  [26-94] 90  Resp:  [17-20] 17  SpO2:  [90 %-96 %]  93 %  BP: ()/(51-83) 96/66     Weight: 80.1 kg (176 lb 9.4 oz) (05/17/24 0528)  Body mass index is 30.31 kg/m².  Body surface area is 1.9 meters squared.    I/O last 3 completed shifts:  In: 400 [P.O.:400]  Out: 976 [Urine:975; Stool:1]     Physical Exam  Vitals and nursing note reviewed.   Constitutional:       General: She is awake. She is not in acute distress.     Appearance: She is well-developed. She is obese. She is ill-appearing. She is not diaphoretic.      Interventions: Nasal cannula in place.   HENT:      Head: Normocephalic and atraumatic.      Right Ear: External ear normal.      Left Ear: External ear normal.      Nose:      Comments: Nasal cannula in place.     Mouth/Throat:      Mouth: Mucous membranes are moist.      Pharynx: Oropharynx is clear. No oropharyngeal exudate or posterior oropharyngeal erythema.   Eyes:      General: No scleral icterus.        Right eye: No discharge.         Left eye: No discharge.      Extraocular Movements: Extraocular movements intact.      Conjunctiva/sclera: Conjunctivae normal.   Cardiovascular:      Rate and Rhythm: Normal rate.      Heart sounds: Murmur heard.   Pulmonary:      Effort: Pulmonary effort is normal. No respiratory distress.      Breath sounds: No wheezing, rhonchi or rales.   Abdominal:      General: Bowel sounds are normal. There is no distension.      Palpations: Abdomen is soft.      Tenderness: There is no abdominal tenderness.   Genitourinary:     Comments: Muniz catheter in place.  Musculoskeletal:      Cervical back: Neck supple.      Right lower leg: Edema present.      Left lower leg: Edema present.   Skin:     General: Skin is warm and dry.      Coloration: Skin is not jaundiced.      Findings: Bruising, erythema and rash present.   Neurological:      General: No focal deficit present.      Mental Status: She is alert. Mental status is at baseline.      Cranial Nerves: No cranial nerve deficit.      Motor: No weakness.    Psychiatric:         Mood and Affect: Mood normal.         Behavior: Behavior normal. Behavior is cooperative.          Significant Labs:  BMP:   Recent Labs   Lab 05/17/24  0258   GLU 95      K 4.0      CO2 24   BUN 54*   CREATININE 2.3*   CALCIUM 9.9   MG 2.1     CBC:   Recent Labs   Lab 05/17/24 0258   WBC 6.20   RBC 3.69*   HGB 11.0*   HCT 35.0*      MCV 95   MCH 29.8   MCHC 31.4*     CMP:   Recent Labs   Lab 05/17/24  0258   GLU 95   CALCIUM 9.9      K 4.0   CO2 24      BUN 54*   CREATININE 2.3*     Microbiology Results (last 7 days)       Procedure Component Value Units Date/Time    Blood culture [[id number]] Collected: 05/09/24 1433    Order Status: Completed Specimen: Blood Updated: 05/14/24 1612     Blood Culture, Routine No growth after 5 days.    Blood culture [[id number]] Collected: 05/09/24 1434    Order Status: Completed Specimen: Blood Updated: 05/14/24 1612     Blood Culture, Routine No growth after 5 days.    Blood culture [[id number]] Collected: 05/07/24 2251    Order Status: Completed Specimen: Blood from Peripheral, Forearm, Right Updated: 05/13/24 0612     Blood Culture, Routine No growth after 5 days.    Blood culture [[id number]]  (Abnormal) Collected: 05/07/24 2251    Order Status: Completed Specimen: Blood from Peripheral, Hand, Right Updated: 05/11/24 1057     Blood Culture, Routine Gram stain aer bottle: Gram positive cocci in clusters resembling Staph      Results called to and read back by:Josephine Minor LPN 05/09/2024  13:59      MICROCOCCUS SPECIES  Organism is a probable contaminant      Urine culture [[id number]]  (Abnormal)  (Susceptibility) Collected: 05/08/24 0242    Order Status: Completed Specimen: Urine Updated: 05/10/24 1241     Urine Culture, Routine STAPHYLOCOCCUS AUREUS  > 100,000 cfu/ml        ENTEROCOCCUS FAECALIS  50,000 - 99,999 cfu/ml      Narrative:      Specimen Source->Urine          Specimen (24h ago, onward)      None           Significant Imaging:  I have reviewed all imagining in the last 24 hours.

## 2024-05-17 NOTE — ASSESSMENT & PLAN NOTE
78-year-old woman with HFpEF (most recent TTE with EF 55% with G2DD), mild to moderate tricuspid regurgitation, HTN, paroxsymal atrial fibrillation on Eliquis, COPD on 2 liters O2, and CKD IIIb (baseline creatinine ~1.5) admitted to [facility] on 5/7 with acute on chronic CHF exacerbation, MARQUEZ with creatinine 1.8, UTI growing S. aureus & Enterococcus faecalis and concern for right lower extremity cellulitis. She was management in the usually fashion with IV diuresis with Lasix in addition to receiving several different antibiotics including ampicillin, Augmentin, Rocephin, cephalexin, DS Bactrim and vancomycin. Her admission has been complicated by urinary retention necessitating Muniz catheter placement, hypotension (lowest document BP 87/54 mmHg) and worsening renal function for which Nephrology has been consulted. Creatinine trend this admission is as follows 1.8 > 1.5 > 1.2 > 1.3 > 1.9 > 1.8 > 1.9 > 2.1. Mcgarry stain was noted to be positive for occasional eosinophils.  Urinary sediment was assessed which demonstrated innumerable WBCs with some within waxy casts, non dysmorphic RBCs, waxy casts and occasional bacteria. UPCR only 0.2 and retroperitoneal US with changes consistent with chronic medical renal disease, no hydronephrosis but 3 mm nonobstructing right renal stone was noted.    Plan/Recommendations:  - etiology of MARQUEZ not clear, could be pre-renal in the setting of diuresis versus AIN with multiple classes of antibiotics given (including DS Bactrim with trimethoprim being known to inhibit creatinine secretion at organic cation transporters within the proximal tubule)  - no IVFs or diuresis today, encourage adequate PO intake  - agree with stopping antibiotics  - would keep Muniz in place in light of urinary retention  - monitor renal function and UOP over next 24 hours and reassess   - daily RFP and magnesium levels  - renal diet/tube feeds when not NPO with volume restriction per primary team  - strict I/O's  and daily weights  - renally all dose medications to eGFR   - avoid nephrotoxic agents wean feasible (i.e. NSAIDs, intra-arterial contrast, supra-therapeutic vancomycin levels, etc.)  - no acute indications for RRT at this time however will continue to monitor closely

## 2024-05-17 NOTE — PLAN OF CARE
Patient alert and oriented x4, denies pain, no s/s of distress, comfort measures given, VS WNL, plan of care discussed with patient, will continue to monitor.    Problem: Comorbidity Management  Goal: Maintenance of COPD Symptom Control  Outcome: Progressing  Goal: Blood Pressure in Desired Range  Outcome: Progressing     Problem: Dysrhythmia  Goal: Normalized Cardiac Rhythm  Outcome: Progressing     Problem: Pulmonary Impairment  Goal: Improved Activity Tolerance  Outcome: Progressing  Goal: Optimal Gas Exchange  Outcome: Progressing     Problem: Skin or Soft Tissue Infection  Goal: Absence of Infection Signs and Symptoms  Outcome: Progressing     Problem: UTI (Urinary Tract Infection)  Goal: Improved Infection Symptoms  Outcome: Progressing     Problem: Urinary Retention  Goal: Effective Urinary Elimination  Outcome: Progressing

## 2024-05-17 NOTE — ASSESSMENT & PLAN NOTE
Acute interstitial nephritis   - Cr 1.8 on admit, baseline 1.3  - Estimated Creatinine Clearance: 20 mL/min (A) (based on SCr of 2.3 mg/dL (H)).  - Likely 2/2 hypervolemia in setting of CHF exacerbation. Should improve with IV diuresis  - renally dose meds  - avoid nephrotoxic insults  - monitor I/Os  - monitor with daily BMP, replete lytes if necessary  - improved to 1.2 with diuresis  - new Cr bump of 1.9 on am labs, not improving s/p fluids or further diuresis  - urine lytes pending, repeat UA pending, urine urea pending, wrights stain pending    - FeNa calculated at 0.5%   -wrights stain with eosinophils   -discontinued all abx  - nephrology consulted for rise in Cr and possible AIN, appreciate recommendations:  - would hold on further diuresis in addition would no give IVFs at this time  - will follow-up UPCR and retroperitoneal US  - agree with stopping antibiotics especially double strength Bactrim  - RP US with chronic medical renal disease, no hydronephrosis, 3 mm nonobstructing right renal stone.

## 2024-05-17 NOTE — PLAN OF CARE
Brief reassessment of patient to confirm discharge plans have not changed or document changes. No changes to plan, Pt remains in hospital due to continuing medical needs. Pt does not need and HME. Pt is current with [facility] and they are following.      Rocio Stanley, [place]  Case Management Department  [email or web address]    Isaias Tobias - Cardiology Stepdown  Discharge Reassessment    Primary Care Provider: Celia Carlisle MD    Expected Discharge Date: 5/18/2024    Reassessment (most recent)       Discharge Reassessment - 05/17/24 1719          Discharge Reassessment    Assessment Type Discharge Planning Reassessment     Did the patient's condition or plan change since previous assessment? No     Discharge Plan discussed with: Patient     Communicated NOHEMI with patient/caregiver No     Discharge Plan A Home Health;Home with family     Discharge Plan B Home with family;Home Health     DME Needed Upon Discharge  none     Transition of Care Barriers None     Why the patient remains in the hospital Requires continued medical care        Post-Acute Status    Post-Acute Authorization Home Health     Home Health Status Set-up Complete/Auth obtained     Discharge Delays None known at this time

## 2024-05-17 NOTE — SUBJECTIVE & OBJECTIVE
Interval History: NAEON. AFVSS. Patient feels well this morning - no complaints. She is euvolemic on exam. Cr 2.5 -> 2.3 today. Appreciate nephrology assistance.     Review of Systems   Constitutional:  Negative for activity change, chills and fever.   HENT:  Negative for trouble swallowing.    Eyes:  Negative for photophobia and visual disturbance.   Respiratory:  Negative for cough, chest tightness and shortness of breath (improved).         +ERED   Cardiovascular:  Positive for leg swelling. Negative for chest pain and palpitations.   Gastrointestinal:  Negative for abdominal pain, constipation, diarrhea, nausea and vomiting.   Genitourinary:  Negative for dysuria, frequency and hematuria.   Musculoskeletal:  Negative for back pain, gait problem and neck pain.   Skin:  Positive for color change. Negative for rash and wound.   Neurological:  Negative for dizziness, syncope, speech difficulty and light-headedness.   Psychiatric/Behavioral:  Negative for agitation and confusion. The patient is not nervous/anxious.      Objective:     Vital Signs (Most Recent):  Temp: 97.7 °F (36.5 °C) (05/17/24 0820)  Pulse: 86 (05/17/24 0820)  Resp: (!) 21 (05/17/24 0820)  BP: (!) 106/53 (05/17/24 0820)  SpO2: 97 % (05/17/24 0820) Vital Signs (24h Range):  Temp:  [97.5 °F (36.4 °C)-97.8 °F (36.6 °C)] 97.7 °F (36.5 °C)  Pulse:  [26-92] 86  Resp:  [17-21] 21  SpO2:  [92 %-97 %] 97 %  BP: ()/(53-66) 106/53     Weight: 80.1 kg (176 lb 9.4 oz)  Body mass index is 30.31 kg/m².    Intake/Output Summary (Last 24 hours) at 5/17/2024 1140  Last data filed at 5/17/2024 0957  Gross per 24 hour   Intake 880 ml   Output 701 ml   Net 179 ml         Physical Exam  Vitals and nursing note reviewed.   Constitutional:       General: She is not in acute distress.     Appearance: She is well-developed.   HENT:      Head: Normocephalic and atraumatic.      Mouth/Throat:      Pharynx: No oropharyngeal exudate.   Eyes:      General: No scleral  icterus.     Conjunctiva/sclera: Conjunctivae normal.   Cardiovascular:      Rate and Rhythm: Normal rate and regular rhythm.      Heart sounds: Normal heart sounds.   Pulmonary:      Effort: Pulmonary effort is normal. No respiratory distress.      Breath sounds: Normal breath sounds. No wheezing or rales.      Comments: NC in place  Abdominal:      General: Bowel sounds are normal. There is no distension.      Palpations: Abdomen is soft.      Tenderness: There is no abdominal tenderness.   Genitourinary:     Comments: Muniz in place  Musculoskeletal:         General: No tenderness. Normal range of motion.      Cervical back: Normal range of motion and neck supple.      Right lower leg: No edema.      Left lower leg: No edema.   Lymphadenopathy:      Cervical: No cervical adenopathy.   Skin:     General: Skin is warm and dry.      Capillary Refill: Capillary refill takes less than 2 seconds.      Findings: Erythema and rash present.   Neurological:      Mental Status: She is alert and oriented to person, place, and time.      Cranial Nerves: No cranial nerve deficit.      Sensory: No sensory deficit.      Coordination: Coordination normal.   Psychiatric:         Behavior: Behavior normal.         Thought Content: Thought content normal.         Judgment: Judgment normal.             Significant Labs: All pertinent labs within the past 24 hours have been reviewed.  CBC:   Recent Labs   Lab 05/16/24  0230 05/17/24  0258   WBC 5.98 6.20   HGB 11.2* 11.0*   HCT 33.7* 35.0*    217     CMP:   Recent Labs   Lab 05/16/24  0230 05/16/24  1155 05/17/24  0258    137 139   K 3.8 3.7 4.0    103 105   CO2 25 26 24   GLU 85 100 95   BUN 50* 49* 54*   CREATININE 2.5* 2.5* 2.3*   CALCIUM 10.0 10.1 9.9   ANIONGAP 10 8 10       Significant Imaging: I have reviewed all pertinent imaging results/findings within the past 24 hours.

## 2024-05-18 LAB
ALBUMIN SERPL BCP-MCNC: 2.2 G/DL (ref 3.5–5.2)
ANION GAP SERPL CALC-SCNC: 9 MMOL/L (ref 8–16)
BASOPHILS # BLD AUTO: 0.07 K/UL (ref 0–0.2)
BASOPHILS NFR BLD: 1 % (ref 0–1.9)
BUN SERPL-MCNC: 57 MG/DL (ref 8–23)
CALCIUM SERPL-MCNC: 10 MG/DL (ref 8.7–10.5)
CHLORIDE SERPL-SCNC: 106 MMOL/L (ref 95–110)
CO2 SERPL-SCNC: 25 MMOL/L (ref 23–29)
CREAT SERPL-MCNC: 1.9 MG/DL (ref 0.5–1.4)
DIFFERENTIAL METHOD BLD: ABNORMAL
EOSINOPHIL # BLD AUTO: 0.2 K/UL (ref 0–0.5)
EOSINOPHIL NFR BLD: 2.9 % (ref 0–8)
ERYTHROCYTE [DISTWIDTH] IN BLOOD BY AUTOMATED COUNT: 13.4 % (ref 11.5–14.5)
EST. GFR  (NO RACE VARIABLE): 26.4 ML/MIN/1.73 M^2
GLUCOSE SERPL-MCNC: 94 MG/DL (ref 70–110)
HCT VFR BLD AUTO: 33.4 % (ref 37–48.5)
HGB BLD-MCNC: 10.8 G/DL (ref 12–16)
IMM GRANULOCYTES # BLD AUTO: 0.04 K/UL (ref 0–0.04)
IMM GRANULOCYTES NFR BLD AUTO: 0.5 % (ref 0–0.5)
LYMPHOCYTES # BLD AUTO: 1.2 K/UL (ref 1–4.8)
LYMPHOCYTES NFR BLD: 16.6 % (ref 18–48)
MCH RBC QN AUTO: 30.4 PG (ref 27–31)
MCHC RBC AUTO-ENTMCNC: 32.3 G/DL (ref 32–36)
MCV RBC AUTO: 94 FL (ref 82–98)
MONOCYTES # BLD AUTO: 0.9 K/UL (ref 0.3–1)
MONOCYTES NFR BLD: 12 % (ref 4–15)
NEUTROPHILS # BLD AUTO: 4.9 K/UL (ref 1.8–7.7)
NEUTROPHILS NFR BLD: 67 % (ref 38–73)
NRBC BLD-RTO: 0 /100 WBC
PHOSPHATE SERPL-MCNC: 2.6 MG/DL (ref 2.7–4.5)
PLATELET # BLD AUTO: 208 K/UL (ref 150–450)
PMV BLD AUTO: 9.8 FL (ref 9.2–12.9)
POTASSIUM SERPL-SCNC: 3.6 MMOL/L (ref 3.5–5.1)
RBC # BLD AUTO: 3.55 M/UL (ref 4–5.4)
SODIUM SERPL-SCNC: 140 MMOL/L (ref 136–145)
WBC # BLD AUTO: 7.35 K/UL (ref 3.9–12.7)

## 2024-05-18 PROCEDURE — 36415 COLL VENOUS BLD VENIPUNCTURE: CPT

## 2024-05-18 PROCEDURE — 25000242 PHARM REV CODE 250 ALT 637 W/ HCPCS

## 2024-05-18 PROCEDURE — 25000003 PHARM REV CODE 250

## 2024-05-18 PROCEDURE — 20600001 HC STEP DOWN PRIVATE ROOM

## 2024-05-18 PROCEDURE — 99232 SBSQ HOSP IP/OBS MODERATE 35: CPT | Mod: ,,, | Performed by: INTERNAL MEDICINE

## 2024-05-18 PROCEDURE — 80069 RENAL FUNCTION PANEL: CPT

## 2024-05-18 PROCEDURE — 25000003 PHARM REV CODE 250: Performed by: PHYSICIAN ASSISTANT

## 2024-05-18 PROCEDURE — 85025 COMPLETE CBC W/AUTO DIFF WBC: CPT

## 2024-05-18 RX ORDER — FUROSEMIDE 40 MG/1
40 TABLET ORAL DAILY
Status: DISCONTINUED | OUTPATIENT
Start: 2024-05-18 | End: 2024-05-20

## 2024-05-18 RX ORDER — FUROSEMIDE 40 MG/1
40 TABLET ORAL DAILY
Qty: 30 TABLET | Refills: 11 | Status: SHIPPED | OUTPATIENT
Start: 2024-05-18 | End: 2024-05-22 | Stop reason: HOSPADM

## 2024-05-18 RX ORDER — LOSARTAN POTASSIUM 50 MG/1
50 TABLET ORAL DAILY
Start: 2024-05-18 | End: 2024-05-27

## 2024-05-18 RX ADMIN — ACETAMINOPHEN 1000 MG: 500 TABLET ORAL at 11:05

## 2024-05-18 RX ADMIN — ATORVASTATIN CALCIUM 20 MG: 20 TABLET, FILM COATED ORAL at 10:05

## 2024-05-18 RX ADMIN — FLUTICASONE FUROATE AND VILANTEROL TRIFENATATE 1 PUFF: 100; 25 POWDER RESPIRATORY (INHALATION) at 10:05

## 2024-05-18 RX ADMIN — APIXABAN 2.5 MG: 2.5 TABLET, FILM COATED ORAL at 10:05

## 2024-05-18 RX ADMIN — METOPROLOL SUCCINATE 100 MG: 100 TABLET, EXTENDED RELEASE ORAL at 10:05

## 2024-05-18 RX ADMIN — ASPIRIN 81 MG: 81 TABLET, COATED ORAL at 10:05

## 2024-05-18 RX ADMIN — APIXABAN 2.5 MG: 2.5 TABLET, FILM COATED ORAL at 08:05

## 2024-05-18 RX ADMIN — FUROSEMIDE 40 MG: 40 TABLET ORAL at 04:05

## 2024-05-18 RX ADMIN — MICONAZOLE NITRATE 2 % TOPICAL POWDER: at 08:05

## 2024-05-18 RX ADMIN — TAMSULOSIN HYDROCHLORIDE 0.4 MG: 0.4 CAPSULE ORAL at 10:05

## 2024-05-18 RX ADMIN — POLYETHYLENE GLYCOL 3350 17 G: 17 POWDER, FOR SOLUTION ORAL at 10:05

## 2024-05-18 RX ADMIN — MICONAZOLE NITRATE 2 % TOPICAL POWDER: at 09:05

## 2024-05-18 NOTE — ASSESSMENT & PLAN NOTE
Acute interstitial nephritis   - Cr 1.8 on admit, baseline 1.3  - Estimated Creatinine Clearance: 24.2 mL/min (A) (based on SCr of 1.9 mg/dL (H)).  - Likely 2/2 hypervolemia in setting of CHF exacerbation. Should improve with IV diuresis  - renally dose meds  - avoid nephrotoxic insults  - monitor I/Os  - monitor with daily BMP, replete lytes if necessary  - improved to 1.2 with diuresis  - new Cr bump of 1.9 on am labs, not improving s/p fluids or further diuresis  - urine lytes pending, repeat UA pending, urine urea pending, wrights stain pending    - FeNa calculated at 0.5%   -wrights stain with eosinophils   -discontinued all abx  - nephrology consulted for rise in Cr and possible AIN, appreciate recommendations:  - would hold on further diuresis in addition would no give IVFs at this time  - will follow-up UPCR and retroperitoneal US  - agree with stopping antibiotics especially double strength Bactrim  - RP US with chronic medical renal disease, no hydronephrosis, 3 mm nonobstructing right renal stone.   - MARQUEZ resolved. Voiding trial. Resume lasix  - continue to hold losartan and jardiance at [place]  - nephrology clinic referral

## 2024-05-18 NOTE — PROGRESS NOTES
[facility] Medicine  Progress Note    Patient Name: Misa Barajas  MRN: [id number]  Patient Class: IP- Inpatient   Admission Date: 5/7/2024  Length of Stay: 10 days  Attending Physician: Faisal Nixon MD  Primary Care Provider: Celia Carlisle MD        Subjective:     Principal Problem:Acute renal failure superimposed on stage 3b chronic kidney disease        HPI:  Misa Barajas is a 78 y.o. female with a PMHx of HTN, HLD, HFpEF, COPD on home O2 (2L NC baseline O2 sat 92%) and pAF presentswith increasing LE swelling.  She is gained 5 lb in the past 3 days despite increased Lasix use. She also endorses new redness and warmth with associated itching to her RLE. She has small pustules that she noticed to RLE a few days ago.  Denies fever, chills, chest pain, cough, SOB, abdominal pain, n/v/d, dysuria, headaches. Has chronic discoloration to BLEs without recent worsening.     ED: Afebrile . Hr 90s. Satting well on home 2L NC. CBC without leukocytosis or anemia. CMP notable for small MARQUEZ with Cr 1.8 (baseline 1.3) and BUN 26. BNP 1165. Trop 0.032 (0.028 at baseline). Lactic 1.2. CXR with Cardiomegaly with pulmonary vascular congestion and small bilateral pleural effusions, suggestive of pulmonary edema secondary to CHF. Given rocephin, toprol 100 mg and IV lasix 60 mg. Placed in observation for CHF exacerbation.     Overview/Hospital Course:  Misa Barajas is a 78 y.o. F who was admitted to  for further evaluation of acute on chronic diastolic CHF. Diuresing on IV lasix. Echo with EF 55%, unable to assess diastolic function due to a-fib, CVP 15mmHg. Cr 1.8 on admission most likely 2/2 to fluid overloaded, improved with diuresis. UA infectious, culture with staph aureus, enterococcus faecalis. IV rocephin switched to IV vancomycin. Transitioned to oral abx. Patient with urinary retention requiring straight cath. Initiated flomax and bladder scan q6hr. Continued retention -  orosco cath placed prior to discharge. Will set up urology follow-up OP. Cr bump of 1.9 after improvement with diuresis, originally thought to be due to possibly over-diuresis or abx. Unimproved with IVF. Mcgarry's stain with eosinophils, concerning for possible AIN - discontinued all abx. Nephrology consult placed for further recommendations. Holding all IVF and diuretics, measure I/Os, trend renal function. RP US with chronic medical renal disease, no hydronephrosis, 3mm non obstructing renal stone. Kidney function continues to improve. Voiding trial today, restart lasix. Anticipate discharge tomorrow. Hold losartan and jardiance upon dc. Follow ups with nephrology, cardiology, PCP, and urology.     Interval History: NAEON. AFVSS. Patient doing well, no complaints. Nephrology recommending voiding trial and restarting lasix today, given kidney improvement. Anticipate dc tomorrow. Family and patient updated.     Review of Systems   Constitutional:  Negative for activity change, chills and fever.   HENT:  Negative for trouble swallowing.    Eyes:  Negative for photophobia and visual disturbance.   Respiratory:  Negative for cough, chest tightness and shortness of breath (improved).         +REED   Cardiovascular:  Positive for leg swelling. Negative for chest pain and palpitations.   Gastrointestinal:  Negative for abdominal pain, constipation, diarrhea, nausea and vomiting.   Genitourinary:  Negative for dysuria, frequency and hematuria.   Musculoskeletal:  Negative for back pain, gait problem and neck pain.   Skin:  Positive for color change. Negative for rash and wound.   Neurological:  Negative for dizziness, syncope, speech difficulty and light-headedness.   Psychiatric/Behavioral:  Negative for agitation and confusion. The patient is not nervous/anxious.      Objective:     Vital Signs (Most Recent):  Temp: 97.6 °F (36.4 °C) (05/18/24 1532)  Pulse: 83 (05/18/24 1532)  Resp: 16 (05/18/24 1532)  BP: (!) 99/53  (05/18/24 1532)  SpO2: (!) 94 % (05/18/24 1532) Vital Signs (24h Range):  Temp:  [97.5 °F (36.4 °C)-98.5 °F (36.9 °C)] 97.6 °F (36.4 °C)  Pulse:  [] 83  Resp:  [16-20] 16  SpO2:  [92 %-96 %] 94 %  BP: ()/(50-60) 99/53     Weight: 80 kg (176 lb 5.9 oz)  Body mass index is 30.27 kg/m².    Intake/Output Summary (Last 24 hours) at 5/18/2024 1635  Last data filed at 5/18/2024 1556  Gross per 24 hour   Intake 240 ml   Output 1150 ml   Net -910 ml         Physical Exam  Vitals and nursing note reviewed.   Constitutional:       General: She is not in acute distress.     Appearance: She is well-developed.   HENT:      Head: Normocephalic and atraumatic.      Mouth/Throat:      Pharynx: No oropharyngeal exudate.   Eyes:      General: No scleral icterus.     Conjunctiva/sclera: Conjunctivae normal.   Cardiovascular:      Rate and Rhythm: Normal rate and regular rhythm.      Heart sounds: Normal heart sounds.   Pulmonary:      Effort: Pulmonary effort is normal. No respiratory distress.      Breath sounds: Normal breath sounds. No wheezing or rales.      Comments: NC in place  Abdominal:      General: Bowel sounds are normal. There is no distension.      Palpations: Abdomen is soft.      Tenderness: There is no abdominal tenderness.   Genitourinary:     Comments: Muniz in place  Musculoskeletal:         General: No tenderness. Normal range of motion.      Cervical back: Normal range of motion and neck supple.      Right lower leg: Edema present.      Left lower leg: Edema present.   Lymphadenopathy:      Cervical: No cervical adenopathy.   Skin:     General: Skin is warm and dry.      Capillary Refill: Capillary refill takes less than 2 seconds.      Findings: Erythema and rash present.   Neurological:      Mental Status: She is alert and oriented to person, place, and time.      Cranial Nerves: No cranial nerve deficit.      Sensory: No sensory deficit.      Coordination: Coordination normal.   Psychiatric:          Behavior: Behavior normal.         Thought Content: Thought content normal.         Judgment: Judgment normal.             Significant Labs: All pertinent labs within the past 24 hours have been reviewed.  CBC:   Recent Labs   Lab 05/17/24  0258 05/18/24  0834   WBC 6.20 7.35   HGB 11.0* 10.8*   HCT 35.0* 33.4*    208     CMP:   Recent Labs   Lab 05/17/24  0258 05/18/24  0834    140   K 4.0 3.6    106   CO2 24 25   GLU 95 94   BUN 54* 57*   CREATININE 2.3* 1.9*   CALCIUM 9.9 10.0   ALBUMIN  --  2.2*   ANIONGAP 10 9       Significant Imaging: I have reviewed all pertinent imaging results/findings within the past 24 hours.    Assessment/Plan:      * Acute renal failure superimposed on stage 3b chronic kidney disease  Acute interstitial nephritis   - Cr 1.8 on admit, baseline 1.3  - Estimated Creatinine Clearance: 24.2 mL/min (A) (based on SCr of 1.9 mg/dL (H)).  - Likely 2/2 hypervolemia in setting of CHF exacerbation. Should improve with IV diuresis  - renally dose meds  - avoid nephrotoxic insults  - monitor I/Os  - monitor with daily BMP, replete lytes if necessary  - improved to 1.2 with diuresis  - new Cr bump of 1.9 on am labs, not improving s/p fluids or further diuresis  - urine lytes pending, repeat UA pending, urine urea pending, wrights stain pending    - FeNa calculated at 0.5%   -wrights stain with eosinophils   -discontinued all abx  - nephrology consulted for rise in Cr and possible AIN, appreciate recommendations:  - would hold on further diuresis in addition would no give IVFs at this time  - will follow-up UPCR and retroperitoneal US  - agree with stopping antibiotics especially double strength Bactrim  - RP US with chronic medical renal disease, no hydronephrosis, 3 mm nonobstructing right renal stone.   - MARQUEZ resolved. Voiding trial. Resume lasix  - continue to hold losartan and jardiance at [place]  - nephrology clinic referral     Hypermagnesemia  RESOLVED   - mag 3.5 on am labs  -  giving IVF and monitoring for improvement  - repeat pending - 2.2      Hypophosphatemia  RESOLVED  Patient has Abnormal Phosphorus: hypophosphatemia. Will continue to monitor electrolytes closely. Will replace the affected electrolytes and repeat labs to be done after interventions completed. The patient's phosphorus results have been reviewed and are listed below.  Recent Labs   Lab 05/16/24  0230   PHOS 3.1          Urinary retention  - patient with urinary retention on admission requiring straight cath   - q6hr bladder scans ordered   - flomax initiated   - mobilize patient as she has not been out of bed since admission   -  flomax initiated   - orosco placed due to continued urinary retention  - schedule FU with urology at discharge  - plan for voiding trial prior to discharge      Acute cystitis  - UA infectious on admission   - Ucx currently growing staph aureus and enterococcus  - pending susceptibilities of culture  - switched IV rocephin to IV vanc  - bladder scan with 500mL, straight cath performed  - monitor patient for continued urinary retention  - switch to oral bactrim and augmentin  - concerns that abx could be causing MARQUEZ, transition to ampicillin and keflex  - 7d completed of abx, discontinued all due to AIN concerns    Wound of right lower extremity  - afebrile without leukocytosis  - blood cultures pending   - one culture with micrococcus, probable contaminant   - repeat bcx drawn - currently NGTD  - wound care consulted    Elevated troponin  - trop 0.032 on admit, baseline ~0.028  - patient denies chest pain  - likely elevated 2/2 CHF exacerbation  - repeat trop pending --> at baseline  - monitor tele     Acute on chronic diastolic CHF (congestive heart failure)  - last echo with EF 60-65%, GII LV DD  - BNP significantly elevated to 1165  - appears overloaded on exam with BLE edema and increased weight gain over the past few days despite increasing her home lasix from daily to BID  - CXR  consistent with pulmonary edema and small bilateral pleural effusions   - continue BB  - Monitor on telemetry.   - Patient is on CHF pathway.  Monitor strict Is&Os and daily weights.  Place on fluid restriction of 1.5 L.   - start IV diuresis with lasix 40 mg BID, increased to 80mg IV BID  - goal UOP of -2L per day   - diuresis held originally for concerns of over diuresis with Cr bump  - POCUS performed by attending found patient to be fluid overloaded  - initiate 120mg IV lasix BID - held for MARQUEZ  - repeat echo noted below    Results for orders placed during the hospital encounter of 05/07/24    Echo    Interpretation Summary    Left Ventricle: The left ventricle is normal in size. Ventricular mass is normal. Normal wall thickness. Normal wall motion. There is normal systolic function. Ejection fraction by visual approximation is 55%. Unable to assess diastolic function due to atrial fibrillation.    Right Ventricle: Normal right ventricular cavity size. Wall thickness is normal. Systolic function is mildly reduced.    Aortic Valve: The aortic valve is a trileaflet valve. There is moderate aortic valve sclerosis. There is annular calcification present. Mildly restricted motion. No stenosis.    Tricuspid Valve: There is mild to moderate regurgitation.    Pulmonary Artery: The estimated pulmonary artery systolic pressure is 44 mmHg.    IVC/SVC: Elevated venous pressure at 15 mmHg.    - patient volume status improved, euvolemic. Restarting home lasix 40 daily.  - cards follow up next week     Chronic respiratory failure with hypoxia  - chronic and stable on home 2L    Centrilobular emphysema  Patient's COPD is controlled currently.  Patient is currently off COPD Pathway. Continue scheduled inhalers Supplemental oxygen and monitor respiratory status closely.   - satting well on home 2L NC    Hyperlipidemia  - continue statin     Paroxysmal atrial fibrillation  Patient with Paroxysmal (<7 days) atrial fibrillation  which is controlled currently with Beta Blocker. Patient is currently in atrial fibrillation.[id number] Score: 3. Anticoagulation indicated. Anticoagulation done with eliquis 5 mg BID  .    Essential hypertension  - controlled on admit  - continue toprol 100 mg and losartan 50 mg daily   - holding home losartan  - IV diuresis as above  - patient with soft pressures  - patient asymptomatic, monitor closely      VTE Risk Mitigation (From admission, onward)           Ordered     apixaban tablet 2.5 mg  2 times daily         05/08/24 0050     IP VTE HIGH RISK PATIENT  Once         05/07/24 2029     Reason for No Pharmacological VTE Prophylaxis  Once        Question:  Reasons:  Answer:  Already adequately anticoagulated on oral Anticoagulants    05/07/24 2029     Place sequential compression device  Until discontinued         05/07/24 2024                    Discharge Planning   NOHEMI: 5/18/2024     Code Status: Full Code   Is the patient medically ready for discharge?:     Reason for patient still in hospital (select all that apply): Patient trending condition, Laboratory test, Treatment, and Consult recommendations  Discharge Plan A: Home Health, Home with family   Discharge Delays: None known at this time              Danielle Boss PA-C  Department of Hospital Medicine   [facility]

## 2024-05-18 NOTE — ASSESSMENT & PLAN NOTE
- last echo with EF 60-65%, GII LV DD  - BNP significantly elevated to 1165  - appears overloaded on exam with BLE edema and increased weight gain over the past few days despite increasing her home lasix from daily to BID  - CXR consistent with pulmonary edema and small bilateral pleural effusions   - continue BB  - Monitor on telemetry.   - Patient is on CHF pathway.  Monitor strict Is&Os and daily weights.  Place on fluid restriction of 1.5 L.   - start IV diuresis with lasix 40 mg BID, increased to 80mg IV BID  - goal UOP of -2L per day   - diuresis held originally for concerns of over diuresis with Cr bump  - POCUS performed by attending found patient to be fluid overloaded  - initiate 120mg IV lasix BID - held for MARQUEZ  - repeat echo noted below    Results for orders placed during the hospital encounter of 05/07/24    Echo    Interpretation Summary    Left Ventricle: The left ventricle is normal in size. Ventricular mass is normal. Normal wall thickness. Normal wall motion. There is normal systolic function. Ejection fraction by visual approximation is 55%. Unable to assess diastolic function due to atrial fibrillation.    Right Ventricle: Normal right ventricular cavity size. Wall thickness is normal. Systolic function is mildly reduced.    Aortic Valve: The aortic valve is a trileaflet valve. There is moderate aortic valve sclerosis. There is annular calcification present. Mildly restricted motion. No stenosis.    Tricuspid Valve: There is mild to moderate regurgitation.    Pulmonary Artery: The estimated pulmonary artery systolic pressure is 44 mmHg.    IVC/SVC: Elevated venous pressure at 15 mmHg.    - patient volume status improved, euvolemic. Restarting home lasix 40 daily.  - cards follow up next week

## 2024-05-18 NOTE — PLAN OF CARE
Problem: Comorbidity Management  Goal: Maintenance of COPD Symptom Control  Outcome: Progressing  Goal: Blood Pressure in Desired Range  Outcome: Progressing     Problem: Dysrhythmia  Goal: Normalized Cardiac Rhythm  Outcome: Progressing     Problem: Pulmonary Impairment  Goal: Improved Activity Tolerance  Outcome: Progressing  Goal: Effective Airway Clearance  Outcome: Progressing  Goal: Optimal Gas Exchange  Outcome: Progressing     Problem: Skin or Soft Tissue Infection  Goal: Absence of Infection Signs and Symptoms  Outcome: Progressing     Problem: UTI (Urinary Tract Infection)  Goal: Improved Infection Symptoms  Outcome: Progressing     Problem: Urinary Retention  Goal: Effective Urinary Elimination  Outcome: Progressing     Problem: Electrolyte Imbalance  Goal: Electrolyte Balance  Outcome: Progressing     Problem: Acute Kidney Injury/Impairment  Goal: Fluid and Electrolyte Balance  Outcome: Progressing  Goal: Improved Oral Intake  Outcome: Progressing  Goal: Effective Renal Function  Outcome: Progressing

## 2024-05-18 NOTE — PLAN OF CARE
Patient alert and oriented, denies pain, no  s/s of distress, comfort measures given, plan of care discussed with patient, safety instructions given, will continue to monitor.    Problem: Comorbidity Management  Goal: Maintenance of COPD Symptom Control  Outcome: Progressing  Goal: Blood Pressure in Desired Range  Outcome: Progressing     Problem: Dysrhythmia  Goal: Normalized Cardiac Rhythm  Outcome: Progressing     Problem: Pulmonary Impairment  Goal: Improved Activity Tolerance  Outcome: Progressing     Problem: Skin or Soft Tissue Infection  Goal: Absence of Infection Signs and Symptoms  Outcome: Progressing

## 2024-05-18 NOTE — SUBJECTIVE & OBJECTIVE
Interval History: NAEON. AFVSS. Patient doing well, no complaints. Nephrology recommending voiding trial and restarting lasix today, given kidney improvement. Anticipate dc tomorrow. Family and patient updated.     Review of Systems   Constitutional:  Negative for activity change, chills and fever.   HENT:  Negative for trouble swallowing.    Eyes:  Negative for photophobia and visual disturbance.   Respiratory:  Negative for cough, chest tightness and shortness of breath (improved).         +REED   Cardiovascular:  Positive for leg swelling. Negative for chest pain and palpitations.   Gastrointestinal:  Negative for abdominal pain, constipation, diarrhea, nausea and vomiting.   Genitourinary:  Negative for dysuria, frequency and hematuria.   Musculoskeletal:  Negative for back pain, gait problem and neck pain.   Skin:  Positive for color change. Negative for rash and wound.   Neurological:  Negative for dizziness, syncope, speech difficulty and light-headedness.   Psychiatric/Behavioral:  Negative for agitation and confusion. The patient is not nervous/anxious.      Objective:     Vital Signs (Most Recent):  Temp: 97.6 °F (36.4 °C) (05/18/24 1532)  Pulse: 83 (05/18/24 1532)  Resp: 16 (05/18/24 1532)  BP: (!) 99/53 (05/18/24 1532)  SpO2: (!) 94 % (05/18/24 1532) Vital Signs (24h Range):  Temp:  [97.5 °F (36.4 °C)-98.5 °F (36.9 °C)] 97.6 °F (36.4 °C)  Pulse:  [] 83  Resp:  [16-20] 16  SpO2:  [92 %-96 %] 94 %  BP: ()/(50-60) 99/53     Weight: 80 kg (176 lb 5.9 oz)  Body mass index is 30.27 kg/m².    Intake/Output Summary (Last 24 hours) at 5/18/2024 1635  Last data filed at 5/18/2024 1556  Gross per 24 hour   Intake 240 ml   Output 1150 ml   Net -910 ml         Physical Exam  Vitals and nursing note reviewed.   Constitutional:       General: She is not in acute distress.     Appearance: She is well-developed.   HENT:      Head: Normocephalic and atraumatic.      Mouth/Throat:      Pharynx: No oropharyngeal  exudate.   Eyes:      General: No scleral icterus.     Conjunctiva/sclera: Conjunctivae normal.   Cardiovascular:      Rate and Rhythm: Normal rate and regular rhythm.      Heart sounds: Normal heart sounds.   Pulmonary:      Effort: Pulmonary effort is normal. No respiratory distress.      Breath sounds: Normal breath sounds. No wheezing or rales.      Comments: NC in place  Abdominal:      General: Bowel sounds are normal. There is no distension.      Palpations: Abdomen is soft.      Tenderness: There is no abdominal tenderness.   Genitourinary:     Comments: Muniz in place  Musculoskeletal:         General: No tenderness. Normal range of motion.      Cervical back: Normal range of motion and neck supple.      Right lower leg: Edema present.      Left lower leg: Edema present.   Lymphadenopathy:      Cervical: No cervical adenopathy.   Skin:     General: Skin is warm and dry.      Capillary Refill: Capillary refill takes less than 2 seconds.      Findings: Erythema and rash present.   Neurological:      Mental Status: She is alert and oriented to person, place, and time.      Cranial Nerves: No cranial nerve deficit.      Sensory: No sensory deficit.      Coordination: Coordination normal.   Psychiatric:         Behavior: Behavior normal.         Thought Content: Thought content normal.         Judgment: Judgment normal.             Significant Labs: All pertinent labs within the past 24 hours have been reviewed.  CBC:   Recent Labs   Lab 05/17/24  0258 05/18/24  0834   WBC 6.20 7.35   HGB 11.0* 10.8*   HCT 35.0* 33.4*    208     CMP:   Recent Labs   Lab 05/17/24  0258 05/18/24  0834    140   K 4.0 3.6    106   CO2 24 25   GLU 95 94   BUN 54* 57*   CREATININE 2.3* 1.9*   CALCIUM 9.9 10.0   ALBUMIN  --  2.2*   ANIONGAP 10 9       Significant Imaging: I have reviewed all pertinent imaging results/findings within the past 24 hours.

## 2024-05-18 NOTE — PROGRESS NOTES
Nephrology Progress Note      Consult Requested By: Faisal Nixon MD  Reason for Consult: MARQUEZ on CKD    History of Present Illness:  Ms Barajas is an obese (BMI ~31) 78-year-old woman with HFpEF (most recent TTE with EF 55% with G2DD), mild to moderate tricuspid regurgitation, hypertension, HLD, paroxsymal atrial fibrillation on Eliquis, COPD on supplemental oxygen at home with 2 liters via nasal cannula, atherosclerotic disease, chronic lymphedema, history of left sided breast cancer, CKD IIIb (baseline creatinine ~1.5) as well as several other co-morbid conditions who was admitted to [facility] on 5/7 with acute on chronic CHF exacerbation, MARQUEZ with creatinine 1.8, UTI growing S. aureus & Enterococcus faecalis and concern for right lower extremity cellulitis. She was management in the usually fashion with IV diuresis with Lasix in addition to receiving several different antibiotics including ampicillin, Augmentin, Rocephin, cephalexin, Bactrim and vancomycin. Her admission has been complicated by urinary retention necessitating Muniz catheter placement, hypotension (lowest document BP 87/54 mmHg) and worsening renal function for which Nephrology has been consulted. Creatinine trend this admission is as follows 1.8 > 1.5 > 1.2 > 1.3 > 1.9 > 1.8 > 1.9 > 2.1. Mcgarry stain was noted to be positive for occasional eosinophils.  Urinary sediment was assessed which demonstrated innumerable WBCs with some within waxy casts, non dysmorphic RBCs, waxy casts and occasional bacteria.     Reports doing well, no complains.    Past Medical History:   Diagnosis Date    Acute hypoxemic respiratory failure 6/26/2021    Acute superficial venous thrombosis of lower extremity, bilateral 1/25/2022    Aortic atherosclerosis     Arthritis     knee joint pain    Asthma-COPD overlap syndrome 8/22/2022    Breast cancer 2002    left breast & lymph nodes-s/p sx with chemo    Bronchitis     with flu-2/2014    Cataracts, bilateral     Chronic  anticoagulation 5/4/2022    Chronic diastolic heart failure 12/24/2019    Chronic kidney disease, stage 3     Class 1 obesity due to excess calories with serious comorbidity and body mass index (BMI) of 30.0 to 30.9 in adult 5/16/2024    History of chemotherapy     last treatment 12/2002 (had 8 treatments)    Hyperlipidemia 11/20/2016    Hypertension     Lymphedema of both lower extremities 1/25/2022    Nephrolithiasis 6/2/2014    Osteoporosis     Paroxysmal atrial fibrillation 6/7/2021    Renal calculi     hematuria    Renal osteodystrophy 1/14/2016    Venous stasis dermatitis of both lower extremities 1/25/2022    Vitamin D insufficiency          Current Facility-Administered Medications:     acetaminophen tablet 1,000 mg, 1,000 mg, Oral, Q8H PRN, Adina Wolff PA-ROOSEVELT, 1,000 mg at 05/11/24 1133    acetaminophen tablet 650 mg, 650 mg, Oral, Q4H PRN, Emmy Loyola PA-C, 650 mg at 05/10/24 0305    albuterol-ipratropium 2.5 mg-0.5 mg/3 mL nebulizer solution 3 mL, 3 mL, Nebulization, Q4H PRN, Emmy Loyola PA-ROOSEVELT    apixaban tablet 2.5 mg, 2.5 mg, Oral, BID, Emmy Loyola PA-C, 2.5 mg at 05/18/24 1000    aspirin EC tablet 81 mg, 81 mg, Oral, Daily, Emmy Loyola PA-C, 81 mg at 05/18/24 1000    atorvastatin tablet 20 mg, 20 mg, Oral, Daily, Emmy Loyola PA-C, 20 mg at 05/18/24 1000    bisacodyL suppository 10 mg, 10 mg, Rectal, Daily PRN, Emmy Loyola PA-ROOSEVELT    dextrose 10% bolus 125 mL 125 mL, 12.5 g, Intravenous, PRN, Emmy Loyola PA-C    dextrose 10% bolus 250 mL 250 mL, 25 g, Intravenous, PRN, Emmy Loyola PA-C    fluticasone furoate-vilanteroL 100-25 mcg/dose diskus inhaler 1 puff, 1 puff, Inhalation, Daily, Adina Wolff PA-C, 1 puff at 05/18/24 1003    glucagon (human recombinant) injection 1 mg, 1 mg, Intramuscular, PRN, Emmy Loyola PA-C    glucose chewable tablet 16 g, 16 g, Oral, PRN, Emmy Loyola PA-C    glucose chewable  tablet 24 g, 24 g, Oral, PRN, Emmy Loyola PA-C    melatonin tablet 6 mg, 6 mg, Oral, Nightly PRN, Emmy Loyola PA-ROOSEVELT    methocarbamoL tablet 500 mg, 500 mg, Oral, QID PRN, Adina Wolff PA-C    metoprolol succinate (TOPROL-XL) 24 hr tablet 100 mg, 100 mg, Oral, Daily, Emmy Loyola PA-ROOSEVELT, 100 mg at 05/18/24 1008    miconazole NITRATE 2 % top powder, , Topical (Top), BID, Adina Wolff PA-C, Given at 05/18/24 0900    ondansetron disintegrating tablet 8 mg, 8 mg, Oral, Q8H PRN, Emmy Loyola PA-ROOSEVELT    polyethylene glycol packet 17 g, 17 g, Oral, Daily, Adina Wolff PA-C, 17 g at 05/18/24 1000    potassium bicarbonate disintegrating tablet 20 mEq, 20 mEq, Oral, Once, Claribel Ley, NP    promethazine tablet 25 mg, 25 mg, Oral, Q6H PRN, Emmy Loyola PA-ROOSEVELT    sodium chloride 0.9% flush 10 mL, 10 mL, Intravenous, PRN, Emmy Loyola PA-ROOSEVELT    tamsulosin 24 hr capsule 0.4 mg, 0.4 mg, Oral, Daily, Adina Wolff PA-C, 0.4 mg at 05/18/24 1000  Review of patient's allergies indicates:   Allergen Reactions    Adhesive Rash     Pt states she removed a LATEX bandaid and the skin beneath was swollen and red. No other latex causes a reaction.        Past Surgical History:   Procedure Laterality Date    BREAST BIOPSY Left 2002    core bx, +    BREAST BIOPSY Right 2018    core    BREAST BIOPSY Right 2019    BREAST LUMPECTOMY Left 2002    CYSTOSCOPY  4/28/2022    Procedure: CYSTOSCOPY;  Surgeon: Vik Ware MD;  Location: [facility] OR [address] [place] [address];  Service: Urology;;    CYSTOSCOPY  5/30/2022    Procedure: CYSTOSCOPY;  Surgeon: Bonnie Knutson MD;  Location: [facility] OR 1ST FLR;  Service: Urology;;    LASER LITHOTRIPSY  5/30/2022    Procedure: LITHOTRIPSY, USING LASER;  Surgeon: Bonnie Knutson MD;  Location: [facility] OR [address] [place] [address];  Service: Urology;;    LITHOTRIPSY      MASTECTOMY Left 06/2002    left-& lymph node dissection    REPLACEMENT OF STENT Right 5/30/2022     Procedure: REPLACEMENT, STENT;  Surgeon: Bonnie Knutson MD;  Location: [facility] OR 1ST FLR;  Service: Urology;  Laterality: Right;    RETROGRADE PYELOGRAPHY Right 4/28/2022    Procedure: PYELOGRAM, RETROGRADE;  Surgeon: Vik Ware MD;  Location: [facility] OR 1ST FLR;  Service: Urology;  Laterality: Right;    ROBOT-ASSISTED LAPAROSCOPIC PARTIAL NEPHRECTOMY USING DA JESÚS XI Right 3/11/2021    Procedure: XI ROBOTIC NEPHRECTOMY, PARTIAL;  Surgeon: Taz Ortega MD;  Location: [facility] OR 2ND FLR;  Service: Urology;  Laterality: Right;  4hr/ gen with regional  [place] confirmation:  [id number] for 11:15am case [place]    URETEROSCOPIC REMOVAL OF URETERIC CALCULUS Right 5/30/2022    Procedure: REMOVAL, CALCULUS, URETER, URETEROSCOPIC;  Surgeon: Bonnie Knutson MD;  [facility];  Service: Urology;  Laterality: Right;    ureteroscopy Bilateral     6.14    URETEROSCOPY Right 5/30/2022    Procedure: URETEROSCOPY;  Surgeon: Bonnie Knutson MD;  [facility];  Service: Urology;  Laterality: Right;     Family History   Problem Relation Name Age of Onset    Bone cancer Mother Bibi     Breast cancer Mother Bibi     Arthritis Mother Bibi         Breast Cancer    Breast cancer Sister      Cancer Father Maxwell         Asbestos cancer    No Known Problems Brother      Fibroids Daughter      Crohn's disease Daughter      No Known Problems Daughter      Arthritis Sister          Breast Cancer    Anesthesia problems Neg Hx      Glaucoma Neg Hx       Social History     Tobacco Use    Smoking status: Former     Current packs/day: 0.00     Average packs/day: 1 pack/day for 50.0 years (50.0 ttl pk-yrs)     Types: Cigarettes     Start date: 1960     Quit date: 5/20/2009     Years since quitting: 15.0    Smokeless tobacco: Never   Substance Use Topics    Alcohol use: No    Drug use: No       Review of Systems   Constitutional:  Negative for weight loss.   Cardiovascular:  Negative for chest  pain.   Gastrointestinal:  Negative for abdominal pain.       Vitals:    05/18/24 1532   BP: (!) 99/53   Pulse: 83   Resp: 16   Temp: 97.6 °F (36.4 °C)       PHYSICAL EXAMINATION:  General: no distress, well nourished  Lungs: Bilateral moderate air entry  Cardiovascular: S1, S2 normal,  Abdomen: soft, non-tender non-distented;   Neurologic: AAOx3,      LABORATORY DATA:  Lab Results   Component Value Date    CREATININE 1.9 (H) 05/18/2024       Prot/Creat Ratio, Urine   Date Value Ref Range Status   05/15/2024 0.20 0.00 - 0.20 Final   05/14/2021 0.12 0.00 - 0.20 Final   10/14/2020 0.08 0.00 - 0.20 Final       Lab Results   Component Value Date     05/18/2024    K 3.6 05/18/2024    CO2 25 05/18/2024       Lab Results   Component Value Date    .0 (H) 12/04/2017    CALCIUM 10.0 05/18/2024    CAION 1.24 12/08/2014    PHOS 2.6 (L) 05/18/2024       Lab Results   Component Value Date    HGB 10.8 (L) 05/18/2024        Lab Results   Component Value Date    HGBA1C 5.3 05/08/2024       Lab Results   Component Value Date    LDLCALC 67.0 08/21/2023         IMAGING STUDIES  Kidney US: Reviewed  Echocardiogram: Reviewed    IMPRESSION / RECOMMENDATIONS:     1) MARQUEZ on CKD    - MARQUEZ likely due to abx, pseudo-elevation from bactrim vs mild AIN.   - Creatinine trended down with holding lasix, gentle hydration and abx.    Plan     Can resume home lasix  Voiding trial per primary  Hold losartan and Jardiance

## 2024-05-19 LAB
ANION GAP SERPL CALC-SCNC: 9 MMOL/L (ref 8–16)
BUN SERPL-MCNC: 53 MG/DL (ref 8–23)
CALCIUM SERPL-MCNC: 10.4 MG/DL (ref 8.7–10.5)
CHLORIDE SERPL-SCNC: 105 MMOL/L (ref 95–110)
CO2 SERPL-SCNC: 26 MMOL/L (ref 23–29)
CREAT SERPL-MCNC: 2.1 MG/DL (ref 0.5–1.4)
EST. GFR  (NO RACE VARIABLE): 23.4 ML/MIN/1.73 M^2
GLUCOSE SERPL-MCNC: 135 MG/DL (ref 70–110)
POTASSIUM SERPL-SCNC: 3.7 MMOL/L (ref 3.5–5.1)
SODIUM SERPL-SCNC: 140 MMOL/L (ref 136–145)

## 2024-05-19 PROCEDURE — 27000221 HC OXYGEN, UP TO 24 HOURS

## 2024-05-19 PROCEDURE — 94640 AIRWAY INHALATION TREATMENT: CPT

## 2024-05-19 PROCEDURE — 94761 N-INVAS EAR/PLS OXIMETRY MLT: CPT

## 2024-05-19 PROCEDURE — 20600001 HC STEP DOWN PRIVATE ROOM

## 2024-05-19 PROCEDURE — 36415 COLL VENOUS BLD VENIPUNCTURE: CPT | Performed by: PHYSICIAN ASSISTANT

## 2024-05-19 PROCEDURE — 99900035 HC TECH TIME PER 15 MIN (STAT)

## 2024-05-19 PROCEDURE — 25000003 PHARM REV CODE 250: Performed by: PHYSICIAN ASSISTANT

## 2024-05-19 PROCEDURE — 25000003 PHARM REV CODE 250

## 2024-05-19 PROCEDURE — 51798 US URINE CAPACITY MEASURE: CPT

## 2024-05-19 PROCEDURE — 80048 BASIC METABOLIC PNL TOTAL CA: CPT | Performed by: PHYSICIAN ASSISTANT

## 2024-05-19 PROCEDURE — 99232 SBSQ HOSP IP/OBS MODERATE 35: CPT | Mod: ,,, | Performed by: INTERNAL MEDICINE

## 2024-05-19 RX ADMIN — METOPROLOL SUCCINATE 100 MG: 100 TABLET, EXTENDED RELEASE ORAL at 08:05

## 2024-05-19 RX ADMIN — APIXABAN 2.5 MG: 2.5 TABLET, FILM COATED ORAL at 08:05

## 2024-05-19 RX ADMIN — MICONAZOLE NITRATE 2 % TOPICAL POWDER: at 08:05

## 2024-05-19 RX ADMIN — APIXABAN 2.5 MG: 2.5 TABLET, FILM COATED ORAL at 10:05

## 2024-05-19 RX ADMIN — ASPIRIN 81 MG: 81 TABLET, COATED ORAL at 08:05

## 2024-05-19 RX ADMIN — ATORVASTATIN CALCIUM 20 MG: 20 TABLET, FILM COATED ORAL at 08:05

## 2024-05-19 RX ADMIN — FUROSEMIDE 40 MG: 40 TABLET ORAL at 08:05

## 2024-05-19 RX ADMIN — MICONAZOLE NITRATE 2 % TOPICAL POWDER: at 10:05

## 2024-05-19 RX ADMIN — FLUTICASONE FUROATE AND VILANTEROL TRIFENATATE 1 PUFF: 100; 25 POWDER RESPIRATORY (INHALATION) at 08:05

## 2024-05-19 RX ADMIN — TAMSULOSIN HYDROCHLORIDE 0.4 MG: 0.4 CAPSULE ORAL at 08:05

## 2024-05-19 NOTE — PROGRESS NOTES
Nephrology Progress Note      Consult Requested By: Faisal Nixon MD  Reason for Consult: MARQUEZ on CKD    History of Present Illness:  Ms Barajas is an obese (BMI ~31) 78-year-old woman with HFpEF (most recent TTE with EF 55% with G2DD), mild to moderate tricuspid regurgitation, hypertension, HLD, paroxsymal atrial fibrillation on Eliquis, COPD on supplemental oxygen at home with 2 liters via nasal cannula, atherosclerotic disease, chronic lymphedema, history of left sided breast cancer, CKD IIIb (baseline creatinine ~1.5) as well as several other co-morbid conditions who was admitted to [facility] on 5/7 with acute on chronic CHF exacerbation, MARQUEZ with creatinine 1.8, UTI growing S. aureus & Enterococcus faecalis and concern for right lower extremity cellulitis. She was management in the usually fashion with IV diuresis with Lasix in addition to receiving several different antibiotics including ampicillin, Augmentin, Rocephin, cephalexin, Bactrim and vancomycin. Her admission has been complicated by urinary retention necessitating Muniz catheter placement, hypotension (lowest document BP 87/54 mmHg) and worsening renal function for which Nephrology has been consulted. Creatinine trend this admission is as follows 1.8 > 1.5 > 1.2 > 1.3 > 1.9 > 1.8 > 1.9 > 2.1. Mcgarry stain was noted to be positive for occasional eosinophils.  Urinary sediment was assessed which demonstrated innumerable WBCs with some within waxy casts, non dysmorphic RBCs, waxy casts and occasional bacteria.     Reports doing well, no complains.    Past Medical History:   Diagnosis Date    Acute hypoxemic respiratory failure 6/26/2021    Acute superficial venous thrombosis of lower extremity, bilateral 1/25/2022    Aortic atherosclerosis     Arthritis     knee joint pain    Asthma-COPD overlap syndrome 8/22/2022    Breast cancer 2002    left breast & lymph nodes-s/p sx with chemo    Bronchitis     with flu-2/2014    Cataracts, bilateral     Chronic  anticoagulation 5/4/2022    Chronic diastolic heart failure 12/24/2019    Chronic kidney disease, stage 3     Class 1 obesity due to excess calories with serious comorbidity and body mass index (BMI) of 30.0 to 30.9 in adult 5/16/2024    History of chemotherapy     last treatment 12/2002 (had 8 treatments)    Hyperlipidemia 11/20/2016    Hypertension     Lymphedema of both lower extremities 1/25/2022    Nephrolithiasis 6/2/2014    Osteoporosis     Paroxysmal atrial fibrillation 6/7/2021    Renal calculi     hematuria    Renal osteodystrophy 1/14/2016    Venous stasis dermatitis of both lower extremities 1/25/2022    Vitamin D insufficiency          Current Facility-Administered Medications:     acetaminophen tablet 1,000 mg, 1,000 mg, Oral, Q8H PRN, Adina Wolff PA-ROOSEVELT, 1,000 mg at 05/18/24 2324    acetaminophen tablet 650 mg, 650 mg, Oral, Q4H PRN, Emmy Loyola PA-C, 650 mg at 05/10/24 0305    albuterol-ipratropium 2.5 mg-0.5 mg/3 mL nebulizer solution 3 mL, 3 mL, Nebulization, Q4H PRN, Emmy Loyola PA-ROOSEVELT    apixaban tablet 2.5 mg, 2.5 mg, Oral, BID, Emmy Loyola PA-C, 2.5 mg at 05/19/24 0820    aspirin EC tablet 81 mg, 81 mg, Oral, Daily, Emmy Loyola PA-C, 81 mg at 05/19/24 0821    atorvastatin tablet 20 mg, 20 mg, Oral, Daily, Emmy Loyola PA-C, 20 mg at 05/19/24 0820    bisacodyL suppository 10 mg, 10 mg, Rectal, Daily PRN, Emmy Loyola PA-ROOSEVELT    dextrose 10% bolus 125 mL 125 mL, 12.5 g, Intravenous, PRN, Emmy Loyola PA-ROOSEVELT    dextrose 10% bolus 250 mL 250 mL, 25 g, Intravenous, PRN, Emmy Loyola PA-C    fluticasone furoate-vilanteroL 100-25 mcg/dose diskus inhaler 1 puff, 1 puff, Inhalation, Daily, Adina Wolff PA-C, 1 puff at 05/19/24 0800    furosemide tablet 40 mg, 40 mg, Oral, Daily, Danielle Boss PA-C, 40 mg at 05/19/24 0821    glucagon (human recombinant) injection 1 mg, 1 mg, Intramuscular, PRN, Emmy Loyola PA-C     glucose chewable tablet 16 g, 16 g, Oral, PRN, Emmy Loyola, PA-C    glucose chewable tablet 24 g, 24 g, Oral, PRN, Emmy Loyola PA-ROOSEVELT    melatonin tablet 6 mg, 6 mg, Oral, Nightly PRN, Emmy Loyola, PA-C    methocarbamoL tablet 500 mg, 500 mg, Oral, QID PRN, Adina Wolff PA-C    metoprolol succinate (TOPROL-XL) 24 hr tablet 100 mg, 100 mg, Oral, Daily, Emmy Loyola, PA-C, 100 mg at 05/19/24 0821    miconazole NITRATE 2 % top powder, , Topical (Top), BID, Adina Wolff PA-C, Given at 05/19/24 0821    ondansetron disintegrating tablet 8 mg, 8 mg, Oral, Q8H PRN, Emmy Loyola PA-ROOSEVELT    polyethylene glycol packet 17 g, 17 g, Oral, Daily, Adina Wolff PA-C, 17 g at 05/18/24 1000    potassium bicarbonate disintegrating tablet 20 mEq, 20 mEq, Oral, Once, Claribel Ley NP    promethazine tablet 25 mg, 25 mg, Oral, Q6H PRN, Emmy Loyola PA-ROOSEVELT    sodium chloride 0.9% flush 10 mL, 10 mL, Intravenous, PRN, Emmy Loyola PA-ROOSEVELT    tamsulosin 24 hr capsule 0.4 mg, 0.4 mg, Oral, Daily, Adina Wolff PA-C, 0.4 mg at 05/19/24 0821  Review of patient's allergies indicates:   Allergen Reactions    Adhesive Rash     Pt states she removed a LATEX bandaid and the skin beneath was swollen and red. No other latex causes a reaction.        Past Surgical History:   Procedure Laterality Date    BREAST BIOPSY Left 2002    core bx, +    BREAST BIOPSY Right 2018    core    BREAST BIOPSY Right 2019    BREAST LUMPECTOMY Left 2002    CYSTOSCOPY  4/28/2022    Procedure: CYSTOSCOPY;  Surgeon: Vik Ware MD;  Location: [facility] OR [address] [place] [address];  Service: Urology;;    CYSTOSCOPY  5/30/2022    Procedure: CYSTOSCOPY;  Surgeon: Bonnie Knutson MD;  Location: [facility] OR [address] [place] [address];  Service: Urology;;    LASER LITHOTRIPSY  5/30/2022    Procedure: LITHOTRIPSY, USING LASER;  Surgeon: Bonnie Knutson MD;  Location: [facility] OR [address] [place] [address];  Service: Urology;;    LITHOTRIPSY       MASTECTOMY Left 06/2002    left-& lymph node dissection    REPLACEMENT OF STENT Right 5/30/2022    Procedure: REPLACEMENT, STENT;  Surgeon: Bonnie Knutson MD;  Location: [facility] OR 1ST FLR;  Service: Urology;  Laterality: Right;    RETROGRADE PYELOGRAPHY Right 4/28/2022    Procedure: PYELOGRAM, RETROGRADE;  Surgeon: Vik Ware MD;  Location: [facility] OR 1ST FLR;  Service: Urology;  Laterality: Right;    ROBOT-ASSISTED LAPAROSCOPIC PARTIAL NEPHRECTOMY USING DA JESÚS XI Right 3/11/2021    Procedure: XI ROBOTIC NEPHRECTOMY, PARTIAL;  Surgeon: Taz Ortega MD;  Location: [facility] OR 2ND FLR;  Service: Urology;  Laterality: Right;  4hr/ gen with regional  [place] confirmation:  [id number] for 11:15am case [place]    URETEROSCOPIC REMOVAL OF URETERIC CALCULUS Right 5/30/2022    Procedure: REMOVAL, CALCULUS, URETER, URETEROSCOPIC;  Surgeon: Bonnie Knutson MD;  Location: [facility] OR 1ST FLR;  Service: Urology;  Laterality: Right;    ureteroscopy Bilateral     6.14    URETEROSCOPY Right 5/30/2022    Procedure: URETEROSCOPY;  Surgeon: Bonnie Knutson MD;  Location: [facility] OR 1ST FLR;  Service: Urology;  Laterality: Right;     Family History   Problem Relation Name Age of Onset    Bone cancer Mother Bibi     Breast cancer Mother Bibi     Arthritis Mother Bibi         Breast Cancer    Breast cancer Sister      Cancer Father Edaudi         Asbestos cancer    No Known Problems Brother      Fibroids Daughter      Crohn's disease Daughter      No Known Problems Daughter      Arthritis Sister          Breast Cancer    Anesthesia problems Neg Hx      Glaucoma Neg Hx       Social History     Tobacco Use    Smoking status: Former     Current packs/day: 0.00     Average packs/day: 1 pack/day for 50.0 years (50.0 ttl pk-yrs)     Types: Cigarettes     Start date: 1960     Quit date: 5/20/2009     Years since quitting: 15.0    Smokeless tobacco: Never   Substance Use Topics    Alcohol use: No    Drug use: No        Review of Systems   Constitutional:  Negative for weight loss.   Cardiovascular:  Negative for chest pain.   Gastrointestinal:  Negative for abdominal pain.       Vitals:    05/19/24 1552   BP: (!) 95/55   Pulse: 62   Resp: 18   Temp: 96 °F (35.6 °C)       PHYSICAL EXAMINATION:  General: no distress, well nourished  Lungs: Bilateral moderate air entry  Cardiovascular: S1, S2 normal,  Abdomen: soft, non-tender non-distented;   Neurologic: AAOx3,      LABORATORY DATA:  Lab Results   Component Value Date    CREATININE 2.1 (H) 05/19/2024       Prot/Creat Ratio, Urine   Date Value Ref Range Status   05/15/2024 0.20 0.00 - 0.20 Final   05/14/2021 0.12 0.00 - 0.20 Final   10/14/2020 0.08 0.00 - 0.20 Final       Lab Results   Component Value Date     05/19/2024    K 3.7 05/19/2024    CO2 26 05/19/2024       Lab Results   Component Value Date    .0 (H) 12/04/2017    CALCIUM 10.4 05/19/2024    CAION 1.24 12/08/2014    PHOS 2.6 (L) 05/18/2024       Lab Results   Component Value Date    HGB 10.8 (L) 05/18/2024        Lab Results   Component Value Date    HGBA1C 5.3 05/08/2024       Lab Results   Component Value Date    LDLCALC 67.0 08/21/2023         IMAGING STUDIES  Kidney US: Reviewed  Echocardiogram: Reviewed    IMPRESSION / RECOMMENDATIONS:     1) MARQUEZ on CKD    - MARQUEZ likely due to abx, pseudo-elevation from bactrim vs mild AIN.   - Creatinine trended down with holding lasix, gentle hydration and abx.    Plan   BUN down, creatinine went to 2.1, good urine, monitor for now.  Can continue to hold lasix.  Muniz removed, no post void residual  Hold losartan and Jardiance      Discussed with pt and daughter.

## 2024-05-19 NOTE — ASSESSMENT & PLAN NOTE
- last echo with EF 60-65%, GII LV DD  - BNP significantly elevated to 1165  - appears overloaded on exam with BLE edema and increased weight gain over the past few days despite increasing her home lasix from daily to BID  - CXR consistent with pulmonary edema and small bilateral pleural effusions   - continue BB  - Monitor on telemetry.   - Patient is on CHF pathway.  Monitor strict Is&Os and daily weights.  Place on fluid restriction of 1.5 L.   - start IV diuresis with lasix 40 mg BID, increased to 80mg IV BID  - goal UOP of -2L per day   - diuresis held originally for concerns of over diuresis with Cr bump  - POCUS performed by attending found patient to be fluid overloaded  - initiate 120mg IV lasix BID - held for MARQEUZ  - repeat echo noted below    Results for orders placed during the hospital encounter of 05/07/24    Echo    Interpretation Summary    Left Ventricle: The left ventricle is normal in size. Ventricular mass is normal. Normal wall thickness. Normal wall motion. There is normal systolic function. Ejection fraction by visual approximation is 55%. Unable to assess diastolic function due to atrial fibrillation.    Right Ventricle: Normal right ventricular cavity size. Wall thickness is normal. Systolic function is mildly reduced.    Aortic Valve: The aortic valve is a trileaflet valve. There is moderate aortic valve sclerosis. There is annular calcification present. Mildly restricted motion. No stenosis.    Tricuspid Valve: There is mild to moderate regurgitation.    Pulmonary Artery: The estimated pulmonary artery systolic pressure is 44 mmHg.    IVC/SVC: Elevated venous pressure at 15 mmHg.    - patient volume status improved, euvolemic. Continuing home lasix 40 daily.  - cards follow up next week

## 2024-05-19 NOTE — ASSESSMENT & PLAN NOTE
Acute interstitial nephritis   - Cr 1.8 on admit, baseline 1.3  - Estimated Creatinine Clearance: 22.4 mL/min (A) (based on SCr of 2.1 mg/dL (H)).  - Likely 2/2 hypervolemia in setting of CHF exacerbation. Should improve with IV diuresis  - renally dose meds  - avoid nephrotoxic insults  - monitor I/Os  - monitor with daily BMP, replete lytes if necessary  - improved to 1.2 with diuresis  - new Cr bump of 1.9 on am labs, not improving s/p fluids or further diuresis  - urine lytes pending, repeat UA pending, urine urea pending, wrights stain pending    - FeNa calculated at 0.5%   -wrights stain with eosinophils   -discontinued all abx  - nephrology consulted for rise in Cr and possible AIN, appreciate recommendations:  - would hold on further diuresis in addition would no give IVFs at this time  - will follow-up UPCR and retroperitoneal US  - agree with stopping antibiotics especially double strength Bactrim  - RP US with chronic medical renal disease, no hydronephrosis, 3 mm nonobstructing right renal stone.   - MARQUEZ resolved. Voiding trial. Resume lasix  - continue to hold losartan and jardiance at [facility]  - nephrology clinic referral

## 2024-05-19 NOTE — PROGRESS NOTES
[facility] - Cardiology [facility]  Progress Note    Patient Name: Misa Barajas  MRN: [id number]  Patient Class: IP- Inpatient   Admission Date: 5/7/2024  Length of Stay: 11 days  Attending Physician: Faisal Nixon MD  Primary Care Provider: Celia Carlisle MD        Subjective:     Principal Problem:Acute renal failure superimposed on stage 3b chronic kidney disease        HPI:  Misa Barajas is a 78 y.o. female with a PMHx of HTN, HLD, HFpEF, COPD on home O2 (2L NC baseline O2 sat 92%) and pAF presentswith increasing LE swelling.  She is gained 5 lb in the past 3 days despite increased Lasix use. She also endorses new redness and warmth with associated itching to her RLE. She has small pustules that she noticed to RLE a few days ago.  Denies fever, chills, chest pain, cough, SOB, abdominal pain, n/v/d, dysuria, headaches. Has chronic discoloration to BLEs without recent worsening.     ED: Afebrile . Hr 90s. Satting well on home 2L NC. CBC without leukocytosis or anemia. CMP notable for small MARQUEZ with Cr 1.8 (baseline 1.3) and BUN 26. BNP 1165. Trop 0.032 (0.028 at baseline). Lactic 1.2. CXR with Cardiomegaly with pulmonary vascular congestion and small bilateral pleural effusions, suggestive of pulmonary edema secondary to CHF. Given rocephin, toprol 100 mg and IV lasix 60 mg. Placed in observation for CHF exacerbation.     Overview/Hospital Course:  Misa Barajas is a 78 y.o. F who was admitted to  for further evaluation of acute on chronic diastolic CHF. Diuresing on IV lasix. Echo with EF 55%, unable to assess diastolic function due to a-fib, CVP 15mmHg. Cr 1.8 on admission most likely 2/2 to fluid overloaded, improved with diuresis. UA infectious, culture with staph aureus, enterococcus faecalis. IV rocephin switched to IV vancomycin. Transitioned to oral abx. Patient with urinary retention requiring straight cath. Initiated flomax and bladder scan q6hr. Continued retention -  orosco cath placed prior to discharge. Will set up urology follow-up OP. Cr bump of 1.9 after improvement with diuresis, originally thought to be due to possibly over-diuresis or abx. Unimproved with IVF. Mcgarry's stain with eosinophils, concerning for possible AIN - discontinued all abx. Nephrology consult placed for further recommendations. Holding all IVF and diuretics, measure I/Os, trend renal function. RP US with chronic medical renal disease, no hydronephrosis, 3mm non obstructing renal stone. Kidney function continues to improve. Voiding trial today, restart lasix. Anticipate discharge tomorrow. Hold losartan and jardiance upon dc. Follow ups with nephrology, cardiology, PCP, and urology.     Interval History: NISREEN. Pt seen and evaluated at bedside this am. Pt is doing well this am. She feels improved. Cr slightly increased this am, will continue to trend per nephrology today. Pt did pass voiding trial today w/ only 8ml in PVR. Pt likely to discharge home w/ family tomorrow.     Review of Systems   Constitutional:  Negative for activity change, chills and fever.   HENT:  Negative for trouble swallowing.    Eyes:  Negative for photophobia and visual disturbance.   Respiratory:  Negative for cough, chest tightness and shortness of breath (improved).    Cardiovascular:  Positive for leg swelling. Negative for chest pain and palpitations.   Gastrointestinal:  Negative for abdominal pain, constipation, diarrhea, nausea and vomiting.   Genitourinary:  Negative for dysuria, frequency and hematuria.   Musculoskeletal:  Negative for back pain, gait problem and neck pain.   Skin:  Negative for rash and wound.   Neurological:  Negative for dizziness, syncope, speech difficulty and light-headedness.   Psychiatric/Behavioral:  Negative for agitation and confusion. The patient is not nervous/anxious.      Objective:     Vital Signs (Most Recent):  Temp: 96.6 °F (35.9 °C) (05/19/24 1158)  Pulse: 62 (05/19/24 1158)  Resp:  18 (05/19/24 1158)  BP: (!) 99/53 (05/19/24 1158)  SpO2: 96 % (05/19/24 1158) Vital Signs (24h Range):  Temp:  [96.6 °F (35.9 °C)-97.6 °F (36.4 °C)] 96.6 °F (35.9 °C)  Pulse:  [62-93] 62  Resp:  [16-18] 18  SpO2:  [94 %-97 %] 96 %  BP: ()/(50-60) 99/53     Weight: 84.1 kg (185 lb 6.5 oz)  Body mass index is 31.83 kg/m².    Intake/Output Summary (Last 24 hours) at 5/19/2024 1504  Last data filed at 5/19/2024 1409  Gross per 24 hour   Intake 960 ml   Output 1333 ml   Net -373 ml         Physical Exam  Vitals and nursing note reviewed.   Constitutional:       General: She is not in acute distress.     Appearance: She is well-developed.   HENT:      Head: Normocephalic and atraumatic.      Mouth/Throat:      Pharynx: No oropharyngeal exudate.   Eyes:      General: No scleral icterus.     Conjunctiva/sclera: Conjunctivae normal.   Cardiovascular:      Rate and Rhythm: Normal rate and regular rhythm.      Heart sounds: Normal heart sounds.   Pulmonary:      Effort: Pulmonary effort is normal. No respiratory distress.      Breath sounds: Normal breath sounds. No wheezing or rales.      Comments: NC in place  Abdominal:      General: Bowel sounds are normal. There is no distension.      Palpations: Abdomen is soft.      Tenderness: There is no abdominal tenderness.   Musculoskeletal:         General: No tenderness. Normal range of motion.      Cervical back: Normal range of motion and neck supple.      Right lower leg: Edema present.      Left lower leg: Edema present.   Lymphadenopathy:      Cervical: No cervical adenopathy.   Skin:     General: Skin is warm and dry.      Capillary Refill: Capillary refill takes less than 2 seconds.      Findings: Erythema and rash present.   Neurological:      Mental Status: She is alert and oriented to person, place, and time.      Cranial Nerves: No cranial nerve deficit.      Sensory: No sensory deficit.      Coordination: Coordination normal.   Psychiatric:         Behavior:  Behavior normal.         Thought Content: Thought content normal.         Judgment: Judgment normal.             Significant Labs: All pertinent labs within the past 24 hours have been reviewed.    Significant Imaging: I have reviewed all pertinent imaging results/findings within the past 24 hours.    Assessment/Plan:      * Acute renal failure superimposed on stage 3b chronic kidney disease  Acute interstitial nephritis   - Cr 1.8 on admit, baseline 1.3  - Estimated Creatinine Clearance: 22.4 mL/min (A) (based on SCr of 2.1 mg/dL (H)).  - Likely 2/2 hypervolemia in setting of CHF exacerbation. Should improve with IV diuresis  - renally dose meds  - avoid nephrotoxic insults  - monitor I/Os  - monitor with daily BMP, replete lytes if necessary  - improved to 1.2 with diuresis  - new Cr bump of 1.9 on am labs, not improving s/p fluids or further diuresis  - urine lytes pending, repeat UA pending, urine urea pending, wrights stain pending    - FeNa calculated at 0.5%   -wrights stain with eosinophils   -discontinued all abx  - nephrology consulted for rise in Cr and possible AIN, appreciate recommendations:  - would hold on further diuresis in addition would no give IVFs at this time  - will follow-up UPCR and retroperitoneal US  - agree with stopping antibiotics especially double strength Bactrim  - RP US with chronic medical renal disease, no hydronephrosis, 3 mm nonobstructing right renal stone.   - MARQUEZ resolved. Voiding trial. Resume lasix  - continue to hold losartan and jardiance at dc  - nephrology clinic referral     Chronic respiratory failure with hypoxia  - chronic and stable on home 2L    Class 1 obesity due to excess calories with serious comorbidity and body mass index (BMI) of 30.0 to 30.9 in adult  Body mass index is 31.83 kg/m². Morbid obesity complicates all aspects of disease management from diagnostic modalities to treatment. Weight loss encouraged and health benefits explained to  "patient.     Hypermagnesemia  RESOLVED   - mag 3.5 on am labs  - giving IVF and monitoring for improvement  - repeat pending - 2.2    Hypophosphatemia  RESOLVED  Patient has Abnormal Phosphorus: hypophosphatemia. Will continue to monitor electrolytes closely. Will replace the affected electrolytes and repeat labs to be done after interventions completed. The patient's phosphorus results have been reviewed and are listed below.  No results for input(s): "PHOS" in the last 24 hours.       Urinary retention  - patient with urinary retention on admission requiring straight cath   - q6hr bladder scans ordered   - flomax initiated   - mobilize patient as she has not been out of bed since admission   -  flomax initiated   - orosco placed due to continued urinary retention  - schedule FU with urology at discharge  - plan for voiding trial prior to discharge      Acute cystitis  - UA infectious on admission   - Ucx currently growing staph aureus and enterococcus  - pending susceptibilities of culture  - switched IV rocephin to IV vanc  - bladder scan with 500mL, straight cath performed  - monitor patient for continued urinary retention  - switch to oral bactrim and augmentin  - concerns that abx could be causing MARQUEZ, transition to ampicillin and keflex  - 7d completed of abx, discontinued all due to AIN concerns    Wound of right lower extremity  - afebrile without leukocytosis  - blood cultures pending   - one culture with micrococcus, probable contaminant   - repeat bcx drawn - currently NGTD  - wound care consulted    Elevated troponin  - trop 0.032 on admit, baseline ~0.028  - patient denies chest pain  - likely elevated 2/2 CHF exacerbation  - repeat trop pending --> at baseline  - monitor tele     Acute on chronic diastolic CHF (congestive heart failure)  - last echo with EF 60-65%, GII LV DD  - BNP significantly elevated to 1165  - appears overloaded on exam with BLE edema and increased weight gain over the past few " days despite increasing her home lasix from daily to BID  - CXR consistent with pulmonary edema and small bilateral pleural effusions   - continue BB  - Monitor on telemetry.   - Patient is on CHF pathway.  Monitor strict Is&Os and daily weights.  Place on fluid restriction of 1.5 L.   - start IV diuresis with lasix 40 mg BID, increased to 80mg IV BID  - goal UOP of -2L per day   - diuresis held originally for concerns of over diuresis with Cr bump  - POCUS performed by attending found patient to be fluid overloaded  - initiate 120mg IV lasix BID - held for MARQUEZ  - repeat echo noted below    Results for orders placed during the hospital encounter of 05/07/24    Echo    Interpretation Summary    Left Ventricle: The left ventricle is normal in size. Ventricular mass is normal. Normal wall thickness. Normal wall motion. There is normal systolic function. Ejection fraction by visual approximation is 55%. Unable to assess diastolic function due to atrial fibrillation.    Right Ventricle: Normal right ventricular cavity size. Wall thickness is normal. Systolic function is mildly reduced.    Aortic Valve: The aortic valve is a trileaflet valve. There is moderate aortic valve sclerosis. There is annular calcification present. Mildly restricted motion. No stenosis.    Tricuspid Valve: There is mild to moderate regurgitation.    Pulmonary Artery: The estimated pulmonary artery systolic pressure is 44 mmHg.    IVC/SVC: Elevated venous pressure at 15 mmHg.    - patient volume status improved, euvolemic. Continuing home lasix 40 daily.  - cards follow up next week     Centrilobular emphysema  Patient's COPD is controlled currently.  Patient is currently off COPD Pathway. Continue scheduled inhalers Supplemental oxygen and monitor respiratory status closely.   - satting well on home 2L NC    Hyperlipidemia  - continue statin     Paroxysmal atrial fibrillation  Patient with Paroxysmal (<7 days) atrial fibrillation which is  controlled currently with Beta Blocker. Patient is currently in atrial fibrillation.[id number] Score: 3. Anticoagulation indicated. Anticoagulation done with eliquis 5 mg BID  .    Essential hypertension  - controlled on admit  - continue toprol 100 mg and losartan 50 mg daily   - holding home losartan  - IV diuresis as above  - patient with soft pressures  - patient asymptomatic, monitor closely      VTE Risk Mitigation (From admission, onward)           Ordered     apixaban tablet 2.5 mg  2 times daily         05/08/24 0050     IP VTE HIGH RISK PATIENT  Once         05/07/24 2029     Reason for No Pharmacological VTE Prophylaxis  Once        Question:  Reasons:  Answer:  Already adequately anticoagulated on oral Anticoagulants    05/07/24 2029     Place sequential compression device  Until discontinued         05/07/24 2024                    Discharge Planning   NOHEMI: 5/19/2024     Code Status: Full Code   Is the patient medically ready for discharge?:     Reason for patient still in hospital (select all that apply): Patient trending condition, Laboratory test, Treatment, and Consult recommendations  Discharge Plan A: Home Health, Home with family   Discharge Delays: None known at this time              Gaviota Viera PA-C  Department of Hospital Medicine   [facility]

## 2024-05-19 NOTE — ASSESSMENT & PLAN NOTE
Body mass index is 31.83 kg/m². Morbid obesity complicates all aspects of disease management from diagnostic modalities to treatment. Weight loss encouraged and health benefits explained to patient.

## 2024-05-19 NOTE — PLAN OF CARE
Patient alert and oriented, denies pain, no  s/s  of distress, comfort measures given, VS WNL, voiding without difficulty after orosco removal, care of plan discussed with patient, will continue to monitor.    Problem: Comorbidity Management  Goal: Maintenance of COPD Symptom Control  Outcome: Progressing     Problem: Dysrhythmia  Goal: Normalized Cardiac Rhythm  Outcome: Progressing     Problem: Pulmonary Impairment  Goal: Improved Activity Tolerance  Outcome: Progressing  Goal: Optimal Gas Exchange  Outcome: Progressing     Problem: Acute Kidney Injury/Impairment  Goal: Fluid and Electrolyte Balance  Outcome: Progressing  Goal: Effective Renal Function  Outcome: Progressing

## 2024-05-19 NOTE — ASSESSMENT & PLAN NOTE
"RESOLVED  Patient has Abnormal Phosphorus: hypophosphatemia. Will continue to monitor electrolytes closely. Will replace the affected electrolytes and repeat labs to be done after interventions completed. The patient's phosphorus results have been reviewed and are listed below.  No results for input(s): "PHOS" in the last 24 hours.     "

## 2024-05-19 NOTE — PLAN OF CARE
Walked with patient x2 assist with walker - walked 6' and turned around to get back in bed due to patient being short of breath. spO2 saturation at that time was 94% on 2LNC, however, patient was visibly short of breath and fatigued. Vitals stable, in bed with call light in reach.

## 2024-05-19 NOTE — SUBJECTIVE & OBJECTIVE
Interval History: NAEON. Pt seen and evaluated at bedside this am. Pt is doing well this am. She feels improved. Cr slightly increased this am, will continue to trend per nephrology today. Pt did pass voiding trial today w/ only 8ml in PVR. Pt likely to discharge home w/ family tomorrow.     Review of Systems   Constitutional:  Negative for activity change, chills and fever.   HENT:  Negative for trouble swallowing.    Eyes:  Negative for photophobia and visual disturbance.   Respiratory:  Negative for cough, chest tightness and shortness of breath (improved).    Cardiovascular:  Positive for leg swelling. Negative for chest pain and palpitations.   Gastrointestinal:  Negative for abdominal pain, constipation, diarrhea, nausea and vomiting.   Genitourinary:  Negative for dysuria, frequency and hematuria.   Musculoskeletal:  Negative for back pain, gait problem and neck pain.   Skin:  Negative for rash and wound.   Neurological:  Negative for dizziness, syncope, speech difficulty and light-headedness.   Psychiatric/Behavioral:  Negative for agitation and confusion. The patient is not nervous/anxious.      Objective:     Vital Signs (Most Recent):  Temp: 96.6 °F (35.9 °C) (05/19/24 1158)  Pulse: 62 (05/19/24 1158)  Resp: 18 (05/19/24 1158)  BP: (!) 99/53 (05/19/24 1158)  SpO2: 96 % (05/19/24 1158) Vital Signs (24h Range):  Temp:  [96.6 °F (35.9 °C)-97.6 °F (36.4 °C)] 96.6 °F (35.9 °C)  Pulse:  [62-93] 62  Resp:  [16-18] 18  SpO2:  [94 %-97 %] 96 %  BP: ()/(50-60) 99/53     Weight: 84.1 kg (185 lb 6.5 oz)  Body mass index is 31.83 kg/m².    Intake/Output Summary (Last 24 hours) at 5/19/2024 1504  Last data filed at 5/19/2024 1409  Gross per 24 hour   Intake 960 ml   Output 1333 ml   Net -373 ml         Physical Exam  Vitals and nursing note reviewed.   Constitutional:       General: She is not in acute distress.     Appearance: She is well-developed.   HENT:      Head: Normocephalic and atraumatic.       Mouth/Throat:      Pharynx: No oropharyngeal exudate.   Eyes:      General: No scleral icterus.     Conjunctiva/sclera: Conjunctivae normal.   Cardiovascular:      Rate and Rhythm: Normal rate and regular rhythm.      Heart sounds: Normal heart sounds.   Pulmonary:      Effort: Pulmonary effort is normal. No respiratory distress.      Breath sounds: Normal breath sounds. No wheezing or rales.      Comments: NC in place  Abdominal:      General: Bowel sounds are normal. There is no distension.      Palpations: Abdomen is soft.      Tenderness: There is no abdominal tenderness.   Musculoskeletal:         General: No tenderness. Normal range of motion.      Cervical back: Normal range of motion and neck supple.      Right lower leg: Edema present.      Left lower leg: Edema present.   Lymphadenopathy:      Cervical: No cervical adenopathy.   Skin:     General: Skin is warm and dry.      Capillary Refill: Capillary refill takes less than 2 seconds.      Findings: Erythema and rash present.   Neurological:      Mental Status: She is alert and oriented to person, place, and time.      Cranial Nerves: No cranial nerve deficit.      Sensory: No sensory deficit.      Coordination: Coordination normal.   Psychiatric:         Behavior: Behavior normal.         Thought Content: Thought content normal.         Judgment: Judgment normal.             Significant Labs: All pertinent labs within the past 24 hours have been reviewed.    Significant Imaging: I have reviewed all pertinent imaging results/findings within the past 24 hours.

## 2024-05-19 NOTE — ASSESSMENT & PLAN NOTE
Patient with Paroxysmal (<7 days) atrial fibrillation which is controlled currently with Beta Blocker. Patient is currently in atrial fibrillation.[id number] Score: 3. Anticoagulation indicated. Anticoagulation done with eliquis 5 mg BID  .

## 2024-05-20 PROBLEM — I95.9 HYPOTENSION: Status: ACTIVE | Noted: 2024-05-20

## 2024-05-20 LAB
ANION GAP SERPL CALC-SCNC: 8 MMOL/L (ref 8–16)
BASOPHILS # BLD AUTO: 0.11 K/UL (ref 0–0.2)
BASOPHILS NFR BLD: 1.5 % (ref 0–1.9)
BUN SERPL-MCNC: 50 MG/DL (ref 8–23)
CALCIUM SERPL-MCNC: 10.5 MG/DL (ref 8.7–10.5)
CHLORIDE SERPL-SCNC: 105 MMOL/L (ref 95–110)
CO2 SERPL-SCNC: 30 MMOL/L (ref 23–29)
CREAT SERPL-MCNC: 1.8 MG/DL (ref 0.5–1.4)
DIFFERENTIAL METHOD BLD: ABNORMAL
EOSINOPHIL # BLD AUTO: 0.3 K/UL (ref 0–0.5)
EOSINOPHIL NFR BLD: 3.7 % (ref 0–8)
ERYTHROCYTE [DISTWIDTH] IN BLOOD BY AUTOMATED COUNT: 13.4 % (ref 11.5–14.5)
EST. GFR  (NO RACE VARIABLE): 28.1 ML/MIN/1.73 M^2
GLUCOSE SERPL-MCNC: 89 MG/DL (ref 70–110)
HCT VFR BLD AUTO: 40.5 % (ref 37–48.5)
HGB BLD-MCNC: 12.3 G/DL (ref 12–16)
IMM GRANULOCYTES # BLD AUTO: 0.03 K/UL (ref 0–0.04)
IMM GRANULOCYTES NFR BLD AUTO: 0.4 % (ref 0–0.5)
LYMPHOCYTES # BLD AUTO: 1.4 K/UL (ref 1–4.8)
LYMPHOCYTES NFR BLD: 19.7 % (ref 18–48)
MCH RBC QN AUTO: 29.4 PG (ref 27–31)
MCHC RBC AUTO-ENTMCNC: 30.4 G/DL (ref 32–36)
MCV RBC AUTO: 97 FL (ref 82–98)
MONOCYTES # BLD AUTO: 0.7 K/UL (ref 0.3–1)
MONOCYTES NFR BLD: 9.2 % (ref 4–15)
NEUTROPHILS # BLD AUTO: 4.8 K/UL (ref 1.8–7.7)
NEUTROPHILS NFR BLD: 65.5 % (ref 38–73)
NRBC BLD-RTO: 0 /100 WBC
PLATELET # BLD AUTO: 262 K/UL (ref 150–450)
PMV BLD AUTO: 10 FL (ref 9.2–12.9)
POTASSIUM SERPL-SCNC: 3.9 MMOL/L (ref 3.5–5.1)
RBC # BLD AUTO: 4.18 M/UL (ref 4–5.4)
SODIUM SERPL-SCNC: 143 MMOL/L (ref 136–145)
WBC # BLD AUTO: 7.26 K/UL (ref 3.9–12.7)

## 2024-05-20 PROCEDURE — 97116 GAIT TRAINING THERAPY: CPT | Mod: CQ

## 2024-05-20 PROCEDURE — 94640 AIRWAY INHALATION TREATMENT: CPT

## 2024-05-20 PROCEDURE — 85025 COMPLETE CBC W/AUTO DIFF WBC: CPT | Performed by: PHYSICIAN ASSISTANT

## 2024-05-20 PROCEDURE — 80048 BASIC METABOLIC PNL TOTAL CA: CPT | Performed by: PHYSICIAN ASSISTANT

## 2024-05-20 PROCEDURE — 97535 SELF CARE MNGMENT TRAINING: CPT

## 2024-05-20 PROCEDURE — 99900035 HC TECH TIME PER 15 MIN (STAT)

## 2024-05-20 PROCEDURE — 94761 N-INVAS EAR/PLS OXIMETRY MLT: CPT

## 2024-05-20 PROCEDURE — 27000221 HC OXYGEN, UP TO 24 HOURS

## 2024-05-20 PROCEDURE — 63600175 PHARM REV CODE 636 W HCPCS: Performed by: PHYSICIAN ASSISTANT

## 2024-05-20 PROCEDURE — 99233 SBSQ HOSP IP/OBS HIGH 50: CPT | Mod: ,,, | Performed by: INTERNAL MEDICINE

## 2024-05-20 PROCEDURE — 25000003 PHARM REV CODE 250: Performed by: PHYSICIAN ASSISTANT

## 2024-05-20 PROCEDURE — 20600001 HC STEP DOWN PRIVATE ROOM

## 2024-05-20 PROCEDURE — 97530 THERAPEUTIC ACTIVITIES: CPT

## 2024-05-20 PROCEDURE — 36415 COLL VENOUS BLD VENIPUNCTURE: CPT | Performed by: PHYSICIAN ASSISTANT

## 2024-05-20 PROCEDURE — 25000003 PHARM REV CODE 250

## 2024-05-20 RX ORDER — SODIUM CHLORIDE, SODIUM LACTATE, POTASSIUM CHLORIDE, CALCIUM CHLORIDE 600; 310; 30; 20 MG/100ML; MG/100ML; MG/100ML; MG/100ML
INJECTION, SOLUTION INTRAVENOUS CONTINUOUS
Status: ACTIVE | OUTPATIENT
Start: 2024-05-20 | End: 2024-05-21

## 2024-05-20 RX ADMIN — FLUTICASONE FUROATE AND VILANTEROL TRIFENATATE 1 PUFF: 100; 25 POWDER RESPIRATORY (INHALATION) at 01:05

## 2024-05-20 RX ADMIN — ATORVASTATIN CALCIUM 20 MG: 20 TABLET, FILM COATED ORAL at 08:05

## 2024-05-20 RX ADMIN — MICONAZOLE NITRATE 2 % TOPICAL POWDER: at 08:05

## 2024-05-20 RX ADMIN — TAMSULOSIN HYDROCHLORIDE 0.4 MG: 0.4 CAPSULE ORAL at 08:05

## 2024-05-20 RX ADMIN — METOPROLOL SUCCINATE 100 MG: 100 TABLET, EXTENDED RELEASE ORAL at 08:05

## 2024-05-20 RX ADMIN — ASPIRIN 81 MG: 81 TABLET, COATED ORAL at 08:05

## 2024-05-20 RX ADMIN — SODIUM CHLORIDE, POTASSIUM CHLORIDE, SODIUM LACTATE AND CALCIUM CHLORIDE: 600; 310; 30; 20 INJECTION, SOLUTION INTRAVENOUS at 03:05

## 2024-05-20 RX ADMIN — APIXABAN 2.5 MG: 2.5 TABLET, FILM COATED ORAL at 08:05

## 2024-05-20 NOTE — SUBJECTIVE & OBJECTIVE
Interval History: Patient seen and examined. No acute events overnight. Afebrile with pulse ranging from 80-60s bpm. Systolic blood pressures ranging from 120-90s mmHg. She is saturating +94% on 2 liters via nasal cannula. Documented UOP of 1,033 mL in the last 24 hours. Renal function improving.    Review of patient's allergies indicates:   Allergen Reactions    Adhesive Rash     Pt states she removed a LATEX bandaid and the skin beneath was swollen and red. No other latex causes a reaction.     Current Facility-Administered Medications   Medication Frequency    acetaminophen tablet 1,000 mg Q8H PRN    acetaminophen tablet 650 mg Q4H PRN    albuterol-ipratropium 2.5 mg-0.5 mg/3 mL nebulizer solution 3 mL Q4H PRN    apixaban tablet 2.5 mg BID    aspirin EC tablet 81 mg Daily    atorvastatin tablet 20 mg Daily    bisacodyL suppository 10 mg Daily PRN    dextrose 10% bolus 125 mL 125 mL PRN    dextrose 10% bolus 250 mL 250 mL PRN    fluticasone furoate-vilanteroL 100-25 mcg/dose diskus inhaler 1 puff Daily    glucagon (human recombinant) injection 1 mg PRN    glucose chewable tablet 16 g PRN    glucose chewable tablet 24 g PRN    melatonin tablet 6 mg Nightly PRN    methocarbamoL tablet 500 mg QID PRN    metoprolol succinate (TOPROL-XL) 24 hr tablet 100 mg Daily    miconazole NITRATE 2 % top powder BID    ondansetron disintegrating tablet 8 mg Q8H PRN    polyethylene glycol packet 17 g Daily    potassium bicarbonate disintegrating tablet 20 mEq Once    promethazine tablet 25 mg Q6H PRN    sodium chloride 0.9% flush 10 mL PRN    tamsulosin 24 hr capsule 0.4 mg Daily       Objective:     Vital Signs (Most Recent):  Temp: 97.4 °F (36.3 °C) (05/20/24 0332)  Pulse: 82 (05/20/24 0332)  Resp: 18 (05/20/24 0332)  BP: (!) 108/59 (05/20/24 0332)  SpO2: 95 % (05/20/24 0332) Vital Signs (24h Range):  Temp:  [96 °F (35.6 °C)-97.5 °F (36.4 °C)] 97.4 °F (36.3 °C)  Pulse:  [60-84] 82  Resp:  [18-20] 18  SpO2:  [94 %-96 %] 95 %  BP:  ()/(53-59) 108/59     Weight:  (Refused to get out of bed) (05/20/24 0332)  Body mass index is 31.83 kg/m².  Body surface area is 1.95 meters squared.    I/O last 3 completed shifts:  In: 960 [P.O.:960]  Out: 1483 [Urine:1483]     Physical Exam  Vitals and nursing note reviewed.   Constitutional:       General: She is awake. She is not in acute distress.     Appearance: She is well-developed. She is obese. She is ill-appearing. She is not diaphoretic.      Interventions: Nasal cannula in place.   HENT:      Head: Normocephalic and atraumatic.      Right Ear: External ear normal.      Left Ear: External ear normal.      Nose:      Comments: Nasal cannula in place.     Mouth/Throat:      Mouth: Mucous membranes are moist.      Pharynx: Oropharynx is clear. No oropharyngeal exudate or posterior oropharyngeal erythema.   Eyes:      General: No scleral icterus.        Right eye: No discharge.         Left eye: No discharge.      Extraocular Movements: Extraocular movements intact.      Conjunctiva/sclera: Conjunctivae normal.   Cardiovascular:      Rate and Rhythm: Normal rate.      Heart sounds: Murmur heard.   Pulmonary:      Effort: Pulmonary effort is normal. No respiratory distress.      Breath sounds: No wheezing, rhonchi or rales.   Abdominal:      General: Bowel sounds are normal. There is no distension.      Palpations: Abdomen is soft.      Tenderness: There is no abdominal tenderness.   Genitourinary:     Comments: Purewick in place.  Musculoskeletal:      Cervical back: Neck supple.      Right lower leg: Edema present.      Left lower leg: Edema present.   Skin:     General: Skin is warm and dry.      Coloration: Skin is not jaundiced.      Findings: Bruising, erythema and rash present.   Neurological:      General: No focal deficit present.      Mental Status: She is alert. Mental status is at baseline.      Cranial Nerves: No cranial nerve deficit.      Motor: No weakness.   Psychiatric:         Mood  and Affect: Mood normal.         Behavior: Behavior normal. Behavior is cooperative.          Significant Labs:  BMP:   Recent Labs   Lab 05/17/24  0258 05/18/24  0834 05/19/24  1038   GLU 95   < > 135*      < > 140   K 4.0   < > 3.7      < > 105   CO2 24   < > 26   BUN 54*   < > 53*   CREATININE 2.3*   < > 2.1*   CALCIUM 9.9   < > 10.4   MG 2.1  --   --     < > = values in this interval not displayed.     CBC:   Recent Labs   Lab 05/18/24 0834   WBC 7.35   RBC 3.55*   HGB 10.8*   HCT 33.4*      MCV 94   MCH 30.4   MCHC 32.3     CMP:   Recent Labs   Lab 05/18/24  0834 05/19/24  1038   GLU 94 135*   CALCIUM 10.0 10.4   ALBUMIN 2.2*  --     140   K 3.6 3.7   CO2 25 26    105   BUN 57* 53*   CREATININE 1.9* 2.1*     Microbiology Results (last 7 days)       Procedure Component Value Units Date/Time    Blood culture [[id number]] Collected: 05/09/24 1433    Order Status: Completed Specimen: Blood Updated: 05/14/24 1612     Blood Culture, Routine No growth after 5 days.    Blood culture [[id number]] Collected: 05/09/24 1434    Order Status: Completed Specimen: Blood Updated: 05/14/24 1612     Blood Culture, Routine No growth after 5 days.          Specimen (24h ago, onward)      None          Significant Imaging:  I have reviewed all imagining in the last 24 hours.

## 2024-05-20 NOTE — ASSESSMENT & PLAN NOTE
Patient with Paroxysmal (<7 days) atrial fibrillation which is controlled currently with Beta Blocker. Patient is currently in atrial fibrillation.[id number] Score: 3. Anticoagulation indicated. Anticoagulation done with eliquis 5 mg BID  .

## 2024-05-20 NOTE — PT/OT/SLP PROGRESS
Physical Therapy Treatment    Patient Name:  Misa Barajas   MRN:  [id number]    Recommendations:     Discharge Recommendations: Low Intensity Therapy  Discharge Equipment Recommendations: walker, rolling  Barriers to discharge: None    Assessment:     Misa Barajas is a 78 y.o. female admitted with a medical diagnosis of Acute renal failure superimposed on stage 3b chronic kidney disease.  She presents with the following impairments/functional limitations: weakness, impaired endurance, impaired self care skills, impaired functional mobility, gait instability, impaired balance, impaired cardiopulmonary response to activity, edema . Patient continues to show limited, especially with gait training. Respiration rate increases with exertion. O2 sat was 95% and heart rate 92 bpm after gait training.    Rehab Prognosis: Good and Fair; patient would benefit from acute skilled PT services to address these deficits and reach maximum level of function.    Recent Surgery: * No surgery found *      Plan:     During this hospitalization, patient to be seen 3 x/week to address the identified rehab impairments via gait training, therapeutic activities, therapeutic exercises, neuromuscular re-education and progress toward the following goals:    Plan of Care Expires:  05/25/24    Subjective     Chief Complaint: fatigue  Patient/Family Comments/goals: to get stronger, so she can go home.  Pain/Comfort:  Pain Rating 1: 0/10  Pain Rating Post-Intervention 1: 0/10      Objective:     Communicated with NSG prior to session.  Patient found HOB elevated with telemetry, PureWick, oxygen upon PT entry to room.     General Precautions: Standard, fall  Orthopedic Precautions: N/A  Braces: N/A  Respiratory Status: Nasal cannula, flow 2 L/min     Functional Mobility:  Bed Mobility:     Rolling Right: stand by assistance  Scooting: stand by assistance  Supine to Sit: stand by assistance  Sit to Supine: contact guard assistance  Transfers:      Sit to Stand:  contact guard assistance with rolling walker  Gait: 3 ft x 2 and 16 ft with RW and CGA, with O2 tank @2L and bedside chair in tow, with cues to correct downwards gaze.      AM-PAC 6 CLICK MOBILITY  Turning over in bed (including adjusting bedclothes, sheets and blankets)?: 3  Sitting down on and standing up from a chair with arms (e.g., wheelchair, bedside commode, etc.): 3  Moving from lying on back to sitting on the side of the bed?: 3  Moving to and from a bed to a chair (including a wheelchair)?: 3  Need to walk in hospital room?: 3  Climbing 3-5 steps with a railing?: 2  Basic Mobility Total Score: 17       Treatment & Education:  Donned gripping socks. Patient sat at EOB for a few minutes to manage medical lines. Patient transferred from bed<>bedside chair with RW and CGA.Educated Patient on the importance of mobilizing daily to prevent muscle atrophy.    Patient left HOB elevated with all lines intact, call button in reach, and NSG and PCT. present.    GOALS:   Multidisciplinary Problems       Physical Therapy Goals          Problem: Physical Therapy    Goal Priority Disciplines Outcome Goal Variances Interventions   Physical Therapy Goal     PT, PT/OT Progressing     Description: Goals to be met by: 2024     Patient will increase functional independence with mobility by performin. Sit to stand transfer with Stand-by Assistance. -In progress  2. Bed to chair transfer with Stand-by Assistance using Rolling Walker. -In progress  3. Gait x 25 feet with Stand-by Assistance using Rolling Walker. -In progress   4. Lower extremity exercise program x20 reps per handout, with supervision. -In progress                       Time Tracking:     PT Received On: 24  PT Start Time: 1457     PT Stop Time: 1515  PT Total Time (min): 18 min     Billable Minutes: Gait Training 18    Treatment Type: Treatment  PT/PTA: PTA     Number of PTA visits since last PT visit: 4     2024

## 2024-05-20 NOTE — PROGRESS NOTES
[facility] Cardiology [facility] Medicine  Progress Note    Patient Name: Misa Barajas  MRN: [id number]  Patient Class: IP- Inpatient   Admission Date: 5/7/2024  Length of Stay: 12 days  Attending Physician: Faisal Nixon MD  Primary Care Provider: Celia Carlisle MD        Subjective:     Principal Problem:Acute renal failure superimposed on stage 3b chronic kidney disease        HPI:  Misa Barajas is a 78 y.o. female with a PMHx of HTN, HLD, HFpEF, COPD on home O2 (2L NC baseline O2 sat 92%) and pAF presentswith increasing LE swelling.  She is gained 5 lb in the past 3 days despite increased Lasix use. She also endorses new redness and warmth with associated itching to her RLE. She has small pustules that she noticed to RLE a few days ago.  Denies fever, chills, chest pain, cough, SOB, abdominal pain, n/v/d, dysuria, headaches. Has chronic discoloration to BLEs without recent worsening.     ED: Afebrile . Hr 90s. Satting well on home 2L NC. CBC without leukocytosis or anemia. CMP notable for small MARQUEZ with Cr 1.8 (baseline 1.3) and BUN 26. BNP 1165. Trop 0.032 (0.028 at baseline). Lactic 1.2. CXR with Cardiomegaly with pulmonary vascular congestion and small bilateral pleural effusions, suggestive of pulmonary edema secondary to CHF. Given rocephin, toprol 100 mg and IV lasix 60 mg. Placed in observation for CHF exacerbation.     Overview/Hospital Course:  Misa Barajas is a 78 y.o. F who was admitted to  for further evaluation of acute on chronic diastolic CHF. Diuresing on IV lasix. Echo with EF 55%, unable to assess diastolic function due to a-fib, CVP 15mmHg. Cr 1.8 on admission most likely 2/2 to fluid overloaded, improved with diuresis. UA infectious, culture with staph aureus, enterococcus faecalis. IV rocephin switched to IV vancomycin. Transitioned to oral abx. Patient with urinary retention requiring straight cath. Initiated flomax and bladder scan q6hr. Continued retention -  orosco cath placed prior to discharge. Will set up urology follow-up OP. Cr bump of 1.9 after improvement with diuresis, originally thought to be due to possibly over-diuresis or abx. Unimproved with IVF. Mcgarry's stain with eosinophils, concerning for possible AIN - discontinued all abx. Nephrology consult placed for further recommendations. Holding all IVF and diuretics, measure I/Os, trend renal function. RP US with chronic medical renal disease, no hydronephrosis, 3mm non obstructing renal stone. Kidney function continues to improve. Voiding trial today, restart lasix. Hold losartan and jardiance upon dc. Follow ups with nephrology, cardiology, PCP, and urology.     Interval History: NAEON. Pt seen and evaluated at bedside today. Lower extremity edema improved. Held am lasix per nephrology today. Pt is dry on exam and will benefit from IVF, as seen by her worsening bicarb and evident by hypotension. Unable to assess OS today 2/2 symptomatic hypotension. Will give IVF and monitor BP overnight.    Review of Systems   Constitutional:  Negative for activity change, chills and fever.   HENT:  Negative for trouble swallowing.    Eyes:  Negative for photophobia and visual disturbance.   Respiratory:  Negative for cough, chest tightness and shortness of breath (improved).    Cardiovascular:  Positive for leg swelling. Negative for chest pain and palpitations.   Gastrointestinal:  Negative for abdominal pain, constipation, diarrhea, nausea and vomiting.   Genitourinary:  Negative for dysuria, frequency and hematuria.   Musculoskeletal:  Negative for back pain, gait problem and neck pain.   Skin:  Negative for rash and wound.   Neurological:  Negative for dizziness, syncope, speech difficulty and light-headedness.   Psychiatric/Behavioral:  Negative for agitation and confusion. The patient is not nervous/anxious.      Objective:     Vital Signs (Most Recent):  Temp: 97.3 °F (36.3 °C) (05/20/24 1139)  Pulse: 90 (05/20/24  1357)  Resp: 16 (05/20/24 1357)  BP: (!) 108/53 (05/20/24 1139)  SpO2: 95 % (05/20/24 1357) Vital Signs (24h Range):  Temp:  [96 °F (35.6 °C)-97.6 °F (36.4 °C)] 97.3 °F (36.3 °C)  Pulse:  [60-96] 90  Resp:  [16-20] 16  SpO2:  [93 %-95 %] 95 %  BP: ()/(53-60) 108/53     Weight:  (Refused to get out of bed)  Body mass index is 31.83 kg/m².    Intake/Output Summary (Last 24 hours) at 5/20/2024 1449  Last data filed at 5/20/2024 0050  Gross per 24 hour   Intake --   Output 500 ml   Net -500 ml         Physical Exam  Vitals and nursing note reviewed.   Constitutional:       General: She is not in acute distress.     Appearance: She is well-developed.   HENT:      Head: Normocephalic and atraumatic.      Mouth/Throat:      Mouth: Mucous membranes are dry.      Pharynx: No oropharyngeal exudate.   Eyes:      General: No scleral icterus.     Conjunctiva/sclera: Conjunctivae normal.   Cardiovascular:      Rate and Rhythm: Normal rate and regular rhythm.      Heart sounds: Normal heart sounds.   Pulmonary:      Effort: Pulmonary effort is normal. No respiratory distress.      Breath sounds: Normal breath sounds. No wheezing or rales.      Comments: NC in place  Abdominal:      General: Bowel sounds are normal. There is no distension.      Palpations: Abdomen is soft.      Tenderness: There is no abdominal tenderness.   Musculoskeletal:         General: No tenderness. Normal range of motion.      Cervical back: Normal range of motion and neck supple.      Right lower leg: Edema (improved) present.      Left lower leg: Edema (improved) present.   Lymphadenopathy:      Cervical: No cervical adenopathy.   Skin:     General: Skin is warm and dry.      Capillary Refill: Capillary refill takes less than 2 seconds.      Findings: Erythema and rash present.   Neurological:      Mental Status: She is alert and oriented to person, place, and time.      Cranial Nerves: No cranial nerve deficit.      Sensory: No sensory deficit.       Coordination: Coordination normal.   Psychiatric:         Behavior: Behavior normal.         Thought Content: Thought content normal.         Judgment: Judgment normal.             Significant Labs: All pertinent labs within the past 24 hours have been reviewed.    Significant Imaging: I have reviewed all pertinent imaging results/findings within the past 24 hours.    Assessment/Plan:      * Acute renal failure superimposed on stage 3b chronic kidney disease  Acute interstitial nephritis   - Cr 1.8 on admit, baseline 1.3  - Estimated Creatinine Clearance: 26.2 mL/min (A) (based on SCr of 1.8 mg/dL (H)).  - Likely 2/2 hypervolemia in setting of CHF exacerbation. Should improve with IV diuresis  - renally dose meds  - avoid nephrotoxic insults  - monitor I/Os  - monitor with daily BMP, replete lytes if necessary  - improved to 1.2 with diuresis  - new Cr bump of 1.9 on am labs, not improving s/p fluids or further diuresis  - urine lytes pending, repeat UA pending, urine urea pending, wrights stain pending    - FeNa calculated at 0.5%   -wrights stain with eosinophils   -discontinued all abx  - nephrology consulted for rise in Cr and possible AIN, appreciate recommendations:  - would hold on further diuresis in addition would no give IVFs at this time  - will follow-up UPCR and retroperitoneal US  - agree with stopping antibiotics especially double strength Bactrim  - RP US with chronic medical renal disease, no hydronephrosis, 3 mm nonobstructing right renal stone.   - MARQUEZ resolved. Voiding trial passed  - holding lasix  - continue to hold losartan and jardiance at dc  - nephrology clinic referral     Hypotension  - holding lasix and losartan.   - likely dry 2/2 over diuresis  - check orthostatics and monitor BP    Chronic respiratory failure with hypoxia  - chronic and stable on home 2L    Class 1 obesity due to excess calories with serious comorbidity and body mass index (BMI) of 30.0 to 30.9 in adult  Body mass  "index is 31.83 kg/m². Morbid obesity complicates all aspects of disease management from diagnostic modalities to treatment. Weight loss encouraged and health benefits explained to patient.     Hypermagnesemia  RESOLVED   - mag 3.5 on am labs  - giving IVF and monitoring for improvement  - repeat pending - 2.2    Hypophosphatemia  RESOLVED  Patient has Abnormal Phosphorus: hypophosphatemia. Will continue to monitor electrolytes closely. Will replace the affected electrolytes and repeat labs to be done after interventions completed. The patient's phosphorus results have been reviewed and are listed below.  No results for input(s): "PHOS" in the last 24 hours.       Urinary retention  - patient with urinary retention on admission requiring straight cath   - q6hr bladder scans ordered   - flomax initiated   - mobilize patient as she has not been out of bed since admission   -  flomax initiated   - orosco dc'd  - schedule FU with urology at discharge    Acute cystitis  - UA infectious on admission   - Ucx currently growing staph aureus and enterococcus  - pending susceptibilities of culture  - switched IV rocephin to IV vanc  - bladder scan with 500mL, straight cath performed  - monitor patient for continued urinary retention  - switch to oral bactrim and augmentin  - concerns that abx could be causing MARQUEZ, transition to ampicillin and keflex  - 7d completed of abx, discontinued all due to AIN concerns    Wound of right lower extremity  - afebrile without leukocytosis  - blood cultures pending   - one culture with micrococcus, probable contaminant   - repeat bcx drawn - currently NGTD  - wound care consulted    Elevated troponin  - trop 0.032 on admit, baseline ~0.028  - patient denies chest pain  - likely elevated 2/2 CHF exacerbation  - repeat trop pending --> at baseline  - monitor tele     Acute on chronic diastolic CHF (congestive heart failure)  - last echo with EF 60-65%, GII LV DD  - BNP significantly elevated to " 1165  - appears overloaded on exam with BLE edema and increased weight gain over the past few days despite increasing her home lasix from daily to BID  - CXR consistent with pulmonary edema and small bilateral pleural effusions   - continue BB  - Monitor on telemetry.   - Patient is on CHF pathway.  Monitor strict Is&Os and daily weights.  Place on fluid restriction of 1.5 L.   - start IV diuresis with lasix 40 mg BID, increased to 80mg IV BID  - goal UOP of -2L per day   - diuresis held originally for concerns of over diuresis with Cr bump  - POCUS performed by attending found patient to be fluid overloaded  - initiate 120mg IV lasix BID - completed  - repeat echo noted below    Results for orders placed during the hospital encounter of 05/07/24    Echo    Interpretation Summary    Left Ventricle: The left ventricle is normal in size. Ventricular mass is normal. Normal wall thickness. Normal wall motion. There is normal systolic function. Ejection fraction by visual approximation is 55%. Unable to assess diastolic function due to atrial fibrillation.    Right Ventricle: Normal right ventricular cavity size. Wall thickness is normal. Systolic function is mildly reduced.    Aortic Valve: The aortic valve is a trileaflet valve. There is moderate aortic valve sclerosis. There is annular calcification present. Mildly restricted motion. No stenosis.    Tricuspid Valve: There is mild to moderate regurgitation.    Pulmonary Artery: The estimated pulmonary artery systolic pressure is 44 mmHg.    IVC/SVC: Elevated venous pressure at 15 mmHg.    - patient volume status improved, euvolemic. Continuing home lasix 40 daily.  - cards follow up next week     Centrilobular emphysema  Patient's COPD is controlled currently.  Patient is currently off COPD Pathway. Continue scheduled inhalers Supplemental oxygen and monitor respiratory status closely.   - satting well on home 2L NC    Hyperlipidemia  - continue statin     Paroxysmal  atrial fibrillation  Patient with Paroxysmal (<7 days) atrial fibrillation which is controlled currently with Beta Blocker. Patient is currently in atrial fibrillation.[id number] Score: 3. Anticoagulation indicated. Anticoagulation done with eliquis 5 mg BID  .    Essential hypertension  - controlled on admit  - continue toprol 100 mg and losartan 50 mg daily   - holding home losartan  - IV diuresis as above  - patient with soft pressures  - patient asymptomatic, monitor closely      VTE Risk Mitigation (From admission, onward)           Ordered     apixaban tablet 2.5 mg  2 times daily         05/08/24 0050     IP VTE HIGH RISK PATIENT  Once         05/07/24 2029     Reason for No Pharmacological VTE Prophylaxis  Once        Question:  Reasons:  Answer:  Already adequately anticoagulated on oral Anticoagulants    05/07/24 2029     Place sequential compression device  Until discontinued         05/07/24 2024                    Discharge Planning   NOHEMI: 5/21/2024     Code Status: Full Code   Is the patient medically ready for discharge?:     Reason for patient still in hospital (select all that apply): Patient trending condition, Laboratory test, and Treatment  Discharge Plan A: Home Health, Home with family   Discharge Delays: None known at this time              Gaviota Viera PA-C  Department of Hospital Medicine   [facility] - [facility] Stepdown

## 2024-05-20 NOTE — PLAN OF CARE
Problem: Acute Kidney Injury/Impairment  Goal: Fluid and Electrolyte Balance  Outcome: Progressing  Goal: Improved Oral Intake  Outcome: Progressing  Goal: Effective Renal Function  Outcome: Progressing     Problem: Comorbidity Management  Goal: Maintenance of COPD Symptom Control  Outcome: Progressing  Goal: Blood Pressure in Desired Range  Outcome: Progressing     Problem: Dysrhythmia  Goal: Normalized Cardiac Rhythm  Outcome: Progressing     Problem: Pulmonary Impairment  Goal: Improved Activity Tolerance  Outcome: Progressing  Goal: Effective Airway Clearance  Outcome: Progressing  Goal: Optimal Gas Exchange  Outcome: Progressing     Problem: Skin or Soft Tissue Infection  Goal: Absence of Infection Signs and Symptoms  Outcome: Progressing     Problem: UTI (Urinary Tract Infection)  Goal: Improved Infection Symptoms  Outcome: Progressing     Problem: Urinary Retention  Goal: Effective Urinary Elimination  Outcome: Progressing     Problem: Electrolyte Imbalance  Goal: Electrolyte Balance  Outcome: Progressing

## 2024-05-20 NOTE — PT/OT/SLP PROGRESS
Occupational Therapy   Treatment    Name: Misa Barajas  MRN: [id number]  Admitting Diagnosis:  Acute renal failure superimposed on stage 3b chronic kidney disease       Recommendations:     Discharge Recommendations: Low Intensity Therapy  Discharge Equipment Recommendations:  walker, rolling  Barriers to discharge:  None    Assessment:     Misa Barajas is a 78 y.o. female with a medical diagnosis of Acute renal failure superimposed on stage 3b chronic kidney disease.  Pt presents with decreased endurance and impaired mobility performance as limited by cardiovascular status and generalized weakness. Pt found upright in bed and agreeable for therapy. Session focused on taking steps to achieve standing weight on scale and EOB breathing strategies. Pt with baseline COPD requiring increased time for recovery with pt on 2L02.  Patient continues to demonstrate the need for low intensity therapy on a scheduled basis exhibited by decreased independence with self-care and functional mobility   Performance deficits affecting function are weakness, impaired functional mobility, impaired endurance, gait instability, impaired self care skills, impaired cardiopulmonary response to activity.     Rehab Prognosis:  Good; patient would benefit from acute skilled OT services to address these deficits and reach maximum level of function.       Plan:     Patient to be seen 3 x/week to address the above listed problems via self-care/home management, therapeutic activities, therapeutic exercises  Plan of Care Expires: 06/14/24  Plan of Care Reviewed with: patient    Subjective     Chief Complaint: needing to catch breath   Patient/Family Comments/goals: to go home to be with coy Romeo  Pain/Comfort:  Pain Rating 1: 0/10  Pain Rating Post-Intervention 1: 0/10    Objective:     Communicated with: RN prior to session.  Patient found HOB elevated with telemetry, oxygen upon OT entry to room.    General Precautions: Standard, fall     Orthopedic Precautions:N/A  Braces: N/A  Respiratory Status: Nasal cannula, flow 2 L/min     Occupational Performance:     Bed Mobility:    Patient completed Rolling/Turning to Right with stand by assistance  Patient completed Scooting/Bridging with stand by assistance  Patient completed Supine to Sit with stand by assistance  Patient completed Sit to Supine with stand by assistance     Functional Mobility/Transfers:  Patient completed Sit <> Stand Transfer with stand by assistance  with  no assistive device   Functional Mobility: Pt stood with SBA and took 5-6 steps to achieve weight on scale. Pt tolerated standing ~3 min (scale having difficulty working). Pt sat EOB ~20 minutes afterwards to catch breath and hold therapeutic conversation related to roles at home.    Activities of Daily Living:  Feeding:  setup A of water at EOB   Grooming: setup A of oral care items in bed   Upper Body Dressing: minimum assistance donning personal house coat EOB   Lower Body Dressing: total assistance don/doff  socks       AMPAC 6 Click ADL: 18    Treatment & Education:  Pt educated on role of occupational therapy, POC, and safety during ADLs and functional mobility. Pt and OT discussed importance of safe, continued mobility to optimize daily living skills. Pt verbalized understanding.     White board updated during session. Pt given instruction to call for medical staff/nurse for assistance.       Patient left HOB elevated with all lines intact, call button in reach, and RN notified    GOALS:   Multidisciplinary Problems       Occupational Therapy Goals          Problem: Occupational Therapy    Goal Priority Disciplines Outcome Interventions   Occupational Therapy Goal     OT, PT/OT Progressing    Description: Goals to be met by: 6/14/24     Patient will increase functional independence with ADLs by performing:    UE Dressing with [place].  LE Dressing with [place].  Grooming while standing at sink with Set-up  Assistance and Supervision.  Toileting from toilet with Modified [place] for hygiene and clothing management.   All functional transfers performed c/ Supervision                         Time Tracking:     OT Date of Treatment: 05/20/24  OT Start Time: 0809  OT Stop Time: 0840  OT Total Time (min): 31 min    Billable Minutes:Self Care/Home Management 20 min  Therapeutic Activity 11 min    OT/MARYA: OT          5/20/2024

## 2024-05-20 NOTE — ASSESSMENT & PLAN NOTE
- patient with urinary retention on admission requiring straight cath   - q6hr bladder scans ordered   - flomax initiated   - mobilize patient as she has not been out of bed since admission   -  flomax initiated   - orosco dc'd  - schedule FU with urology at discharge

## 2024-05-20 NOTE — ASSESSMENT & PLAN NOTE
- last echo with EF 60-65%, GII LV DD  - BNP significantly elevated to 1165  - appears overloaded on exam with BLE edema and increased weight gain over the past few days despite increasing her home lasix from daily to BID  - CXR consistent with pulmonary edema and small bilateral pleural effusions   - continue BB  - Monitor on telemetry.   - Patient is on CHF pathway.  Monitor strict Is&Os and daily weights.  Place on fluid restriction of 1.5 L.   - start IV diuresis with lasix 40 mg BID, increased to 80mg IV BID  - goal UOP of -2L per day   - diuresis held originally for concerns of over diuresis with Cr bump  - POCUS performed by attending found patient to be fluid overloaded  - initiate 120mg IV lasix BID - completed  - repeat echo noted below    Results for orders placed during the hospital encounter of 05/07/24    Echo    Interpretation Summary    Left Ventricle: The left ventricle is normal in size. Ventricular mass is normal. Normal wall thickness. Normal wall motion. There is normal systolic function. Ejection fraction by visual approximation is 55%. Unable to assess diastolic function due to atrial fibrillation.    Right Ventricle: Normal right ventricular cavity size. Wall thickness is normal. Systolic function is mildly reduced.    Aortic Valve: The aortic valve is a trileaflet valve. There is moderate aortic valve sclerosis. There is annular calcification present. Mildly restricted motion. No stenosis.    Tricuspid Valve: There is mild to moderate regurgitation.    Pulmonary Artery: The estimated pulmonary artery systolic pressure is 44 mmHg.    IVC/SVC: Elevated venous pressure at 15 mmHg.    - patient volume status improved, euvolemic. Continuing home lasix 40 daily.  - cards follow up next week

## 2024-05-20 NOTE — ASSESSMENT & PLAN NOTE
- management per primary team  - currently on Toprol- mg daily  - would consider reducing dose in light of hypotension

## 2024-05-20 NOTE — ASSESSMENT & PLAN NOTE
- holding lasix and losartan.   - likely dry 2/2 over diuresis  - check orthostatics and monitor BP   Sarah is calling from [employer] asking that Shannan return her call at [phone] in regards to getting a script faxed for a 4-wheeled heavy duty walker with a seat.    Sarah states height, weight, and dx code need to be on the script.    Please fax order to [phone]

## 2024-05-20 NOTE — ASSESSMENT & PLAN NOTE
78-year-old woman with HFpEF (most recent TTE with EF 55% with G2DD), mild to moderate tricuspid regurgitation, HTN, paroxsymal atrial fibrillation on Eliquis, COPD on 2 liters O2, and CKD IIIb (baseline creatinine ~1.5) admitted to [facility] on 5/7 with acute on chronic CHF exacerbation, MARQUEZ with creatinine 1.8, UTI growing S. aureus & Enterococcus faecalis and concern for right lower extremity cellulitis. She was management in the usually fashion with IV diuresis with Lasix in addition to receiving several different antibiotics including ampicillin, Augmentin, Rocephin, cephalexin, DS Bactrim and vancomycin. Her admission has been complicated by urinary retention necessitating Muniz catheter placement, hypotension (lowest document BP 87/54 mmHg) and worsening renal function for which Nephrology has been consulted. Creatinine trend this admission is as follows 1.8 > 1.5 > 1.2 > 1.3 > 1.9 > 1.8 > 1.9 > 2.1. Mcgarry stain was noted to be positive for occasional eosinophils.  Urinary sediment was assessed which demonstrated innumerable WBCs with some within waxy casts, non dysmorphic RBCs, waxy casts and occasional bacteria. UPCR only 0.2 and retroperitoneal US with changes consistent with chronic medical renal disease, no hydronephrosis but 3 mm nonobstructing right renal stone was noted.    Plan/Recommendations:  - etiology of MARQUEZ not clear, could be pre-renal in the setting of diuresis versus AIN with multiple classes of antibiotics given (including DS Bactrim with trimethoprim being known to inhibit creatinine secretion at organic cation transporters within the proximal tubule)  - renal function with improvement today however would continue to hold home dose ARB in light of hypotension  - would stop SGLT-2 inhibitor moving forward given recurrent UTIs and risks of Fourniers gangrene  - on discharge Lasix 40 mg daily PRN for worsening lower extremity edema  - daily RFP and magnesium levels  - renal diet/tube feeds when  not NPO with volume restriction per primary team  - strict I/O's and daily weights  - renally all dose medications to eGFR   - avoid nephrotoxic agents wean feasible (i.e. NSAIDs, intra-arterial contrast, supra-therapeutic vancomycin levels, etc.)  - no acute indications for RRT at this time however will continue to monitor closely

## 2024-05-20 NOTE — PLAN OF CARE
APPOINTMENTS:    Urology appointment scheduled 06/93 at 3:00pm  Gen Cards appointment 5/28 at 2:00pm  Nephrology appointment scheduled 05/30 at 2:20pm  Hospital f/u with PCP scheduled 5/27 at 2:40pm          Maki Acevedo [facility]  Case Management  [id number]

## 2024-05-20 NOTE — SUBJECTIVE & OBJECTIVE
Interval History: NAEON. Pt seen and evaluated at bedside today. Lower extremity edema improved. Held am lasix per nephrology today. Pt is dry on exam and will benefit from IVF, as seen by her worsening bicarb and evident by hypotension. Unable to assess OS today 2/2 symptomatic hypotension. Will give IVF and monitor BP overnight.    Review of Systems   Constitutional:  Negative for activity change, chills and fever.   HENT:  Negative for trouble swallowing.    Eyes:  Negative for photophobia and visual disturbance.   Respiratory:  Negative for cough, chest tightness and shortness of breath (improved).    Cardiovascular:  Positive for leg swelling. Negative for chest pain and palpitations.   Gastrointestinal:  Negative for abdominal pain, constipation, diarrhea, nausea and vomiting.   Genitourinary:  Negative for dysuria, frequency and hematuria.   Musculoskeletal:  Negative for back pain, gait problem and neck pain.   Skin:  Negative for rash and wound.   Neurological:  Negative for dizziness, syncope, speech difficulty and light-headedness.   Psychiatric/Behavioral:  Negative for agitation and confusion. The patient is not nervous/anxious.      Objective:     Vital Signs (Most Recent):  Temp: 97.3 °F (36.3 °C) (05/20/24 1139)  Pulse: 90 (05/20/24 1357)  Resp: 16 (05/20/24 1357)  BP: (!) 108/53 (05/20/24 1139)  SpO2: 95 % (05/20/24 1357) Vital Signs (24h Range):  Temp:  [96 °F (35.6 °C)-97.6 °F (36.4 °C)] 97.3 °F (36.3 °C)  Pulse:  [60-96] 90  Resp:  [16-20] 16  SpO2:  [93 %-95 %] 95 %  BP: ()/(53-60) 108/53     Weight:  (Refused to get out of bed)  Body mass index is 31.83 kg/m².    Intake/Output Summary (Last 24 hours) at 5/20/2024 1449  Last data filed at 5/20/2024 0050  Gross per 24 hour   Intake --   Output 500 ml   Net -500 ml         Physical Exam  Vitals and nursing note reviewed.   Constitutional:       General: She is not in acute distress.     Appearance: She is well-developed.   HENT:      Head:  Normocephalic and atraumatic.      Mouth/Throat:      Mouth: Mucous membranes are dry.      Pharynx: No oropharyngeal exudate.   Eyes:      General: No scleral icterus.     Conjunctiva/sclera: Conjunctivae normal.   Cardiovascular:      Rate and Rhythm: Normal rate and regular rhythm.      Heart sounds: Normal heart sounds.   Pulmonary:      Effort: Pulmonary effort is normal. No respiratory distress.      Breath sounds: Normal breath sounds. No wheezing or rales.      Comments: NC in place  Abdominal:      General: Bowel sounds are normal. There is no distension.      Palpations: Abdomen is soft.      Tenderness: There is no abdominal tenderness.   Musculoskeletal:         General: No tenderness. Normal range of motion.      Cervical back: Normal range of motion and neck supple.      Right lower leg: Edema (improved) present.      Left lower leg: Edema (improved) present.   Lymphadenopathy:      Cervical: No cervical adenopathy.   Skin:     General: Skin is warm and dry.      Capillary Refill: Capillary refill takes less than 2 seconds.      Findings: Erythema and rash present.   Neurological:      Mental Status: She is alert and oriented to person, place, and time.      Cranial Nerves: No cranial nerve deficit.      Sensory: No sensory deficit.      Coordination: Coordination normal.   Psychiatric:         Behavior: Behavior normal.         Thought Content: Thought content normal.         Judgment: Judgment normal.             Significant Labs: All pertinent labs within the past 24 hours have been reviewed.    Significant Imaging: I have reviewed all pertinent imaging results/findings within the past 24 hours.

## 2024-05-20 NOTE — ASSESSMENT & PLAN NOTE
Acute interstitial nephritis   - Cr 1.8 on admit, baseline 1.3  - Estimated Creatinine Clearance: 26.2 mL/min (A) (based on SCr of 1.8 mg/dL (H)).  - Likely 2/2 hypervolemia in setting of CHF exacerbation. Should improve with IV diuresis  - renally dose meds  - avoid nephrotoxic insults  - monitor I/Os  - monitor with daily BMP, replete lytes if necessary  - improved to 1.2 with diuresis  - new Cr bump of 1.9 on am labs, not improving s/p fluids or further diuresis  - urine lytes pending, repeat UA pending, urine urea pending, wrights stain pending    - FeNa calculated at 0.5%   -wrights stain with eosinophils   -discontinued all abx  - nephrology consulted for rise in Cr and possible AIN, appreciate recommendations:  - would hold on further diuresis in addition would no give IVFs at this time  - will follow-up UPCR and retroperitoneal US  - agree with stopping antibiotics especially double strength Bactrim  - RP US with chronic medical renal disease, no hydronephrosis, 3 mm nonobstructing right renal stone.   - MARQUEZ resolved. Voiding trial passed  - holding lasix  - continue to hold losartan and jardiance at [place]  - nephrology clinic referral

## 2024-05-20 NOTE — PROGRESS NOTES
[facility]  Nephrology  Progress Note    Patient Name: Misa Barajas  MRN: [id number]  Admission Date: 5/7/2024  Hospital Length of Stay: 12 days  Attending Provider: Faisal Nixon MD   Primary Care Physician: Celia Carlisle MD  Principal Problem:Acute renal failure superimposed on stage 3b chronic kidney disease    Subjective:     HPI: Ms Barajas is an obese (BMI ~31) 78-year-old woman with HFpEF (most recent TTE with EF 55% with G2DD), mild to moderate tricuspid regurgitation, hypertension, HLD, paroxsymal atrial fibrillation on Eliquis, COPD on supplemental oxygen at home with 2 liters via nasal cannula, atherosclerotic disease, chronic lymphedema, history of left sided breast cancer, CKD IIIb (baseline creatinine ~1.5) as well as several other co-morbid conditions who was admitted to [facility] on 5/7 with acute on chronic CHF exacerbation, MARQUEZ with creatinine 1.8, UTI growing S. aureus & Enterococcus faecalis and concern for right lower extremity cellulitis. She was management in the usually fashion with IV diuresis with Lasix in addition to receiving several different antibiotics including ampicillin, Augmentin, Rocephin, cephalexin, Bactrim and vancomycin. Her admission has been complicated by urinary retention necessitating Muniz catheter placement, hypotension (lowest document BP 87/54 mmHg) and worsening renal function for which Nephrology has been consulted. Creatinine trend this admission is as follows 1.8 > 1.5 > 1.2 > 1.3 > 1.9 > 1.8 > 1.9 > 2.1. Mcgarry stain was noted to be positive for occasional eosinophils.  Urinary sediment was assessed which demonstrated innumerable WBCs with some within waxy casts, non dysmorphic RBCs, waxy casts and occasional bacteria.     Interval History: Patient seen and examined. No acute events overnight. Afebrile with pulse ranging from 80-60s bpm. Systolic blood pressures ranging from 120-90s mmHg. She is saturating +94% on 2 liters via nasal  cannula. Documented UOP of 1,033 mL in the last 24 hours. Renal function improving.    Review of patient's allergies indicates:   Allergen Reactions    Adhesive Rash     Pt states she removed a LATEX bandaid and the skin beneath was swollen and red. No other latex causes a reaction.     Current Facility-Administered Medications   Medication Frequency    acetaminophen tablet 1,000 mg Q8H PRN    acetaminophen tablet 650 mg Q4H PRN    albuterol-ipratropium 2.5 mg-0.5 mg/3 mL nebulizer solution 3 mL Q4H PRN    apixaban tablet 2.5 mg BID    aspirin EC tablet 81 mg Daily    atorvastatin tablet 20 mg Daily    bisacodyL suppository 10 mg Daily PRN    dextrose 10% bolus 125 mL 125 mL PRN    dextrose 10% bolus 250 mL 250 mL PRN    fluticasone furoate-vilanteroL 100-25 mcg/dose diskus inhaler 1 puff Daily    glucagon (human recombinant) injection 1 mg PRN    glucose chewable tablet 16 g PRN    glucose chewable tablet 24 g PRN    melatonin tablet 6 mg Nightly PRN    methocarbamoL tablet 500 mg QID PRN    metoprolol succinate (TOPROL-XL) 24 hr tablet 100 mg Daily    miconazole NITRATE 2 % top powder BID    ondansetron disintegrating tablet 8 mg Q8H PRN    polyethylene glycol packet 17 g Daily    potassium bicarbonate disintegrating tablet 20 mEq Once    promethazine tablet 25 mg Q6H PRN    sodium chloride 0.9% flush 10 mL PRN    tamsulosin 24 hr capsule 0.4 mg Daily       Objective:     Vital Signs (Most Recent):  Temp: 97.4 °F (36.3 °C) (05/20/24 0332)  Pulse: 82 (05/20/24 0332)  Resp: 18 (05/20/24 0332)  BP: (!) 108/59 (05/20/24 0332)  SpO2: 95 % (05/20/24 0332) Vital Signs (24h Range):  Temp:  [96 °F (35.6 °C)-97.5 °F (36.4 °C)] 97.4 °F (36.3 °C)  Pulse:  [60-84] 82  Resp:  [18-20] 18  SpO2:  [94 %-96 %] 95 %  BP: ()/(53-59) 108/59     Weight:  (Refused to get out of bed) (05/20/24 0332)  Body mass index is 31.83 kg/m².  Body surface area is 1.95 meters squared.    I/O last 3 completed shifts:  In: 960 [P.O.:960]  Out:  1483 [Urine:1483]     Physical Exam  Vitals and nursing note reviewed.   Constitutional:       General: She is awake. She is not in acute distress.     Appearance: She is well-developed. She is obese. She is ill-appearing. She is not diaphoretic.      Interventions: Nasal cannula in place.   HENT:      Head: Normocephalic and atraumatic.      Right Ear: External ear normal.      Left Ear: External ear normal.      Nose:      Comments: Nasal cannula in place.     Mouth/Throat:      Mouth: Mucous membranes are moist.      Pharynx: Oropharynx is clear. No oropharyngeal exudate or posterior oropharyngeal erythema.   Eyes:      General: No scleral icterus.        Right eye: No discharge.         Left eye: No discharge.      Extraocular Movements: Extraocular movements intact.      Conjunctiva/sclera: Conjunctivae normal.   Cardiovascular:      Rate and Rhythm: Normal rate.      Heart sounds: Murmur heard.   Pulmonary:      Effort: Pulmonary effort is normal. No respiratory distress.      Breath sounds: No wheezing, rhonchi or rales.   Abdominal:      General: Bowel sounds are normal. There is no distension.      Palpations: Abdomen is soft.      Tenderness: There is no abdominal tenderness.   Genitourinary:     Comments: Purewick in place.  Musculoskeletal:      Cervical back: Neck supple.      Right lower leg: Edema present.      Left lower leg: Edema present.   Skin:     General: Skin is warm and dry.      Coloration: Skin is not jaundiced.      Findings: Bruising, erythema and rash present.   Neurological:      General: No focal deficit present.      Mental Status: She is alert. Mental status is at baseline.      Cranial Nerves: No cranial nerve deficit.      Motor: No weakness.   Psychiatric:         Mood and Affect: Mood normal.         Behavior: Behavior normal. Behavior is cooperative.          Significant Labs:  BMP:   Recent Labs   Lab 05/17/24  0258 05/18/24  0834 05/19/24  1038   GLU 95   < > 135*      <  > 140   K 4.0   < > 3.7      < > 105   CO2 24   < > 26   BUN 54*   < > 53*   CREATININE 2.3*   < > 2.1*   CALCIUM 9.9   < > 10.4   MG 2.1  --   --     < > = values in this interval not displayed.     CBC:   Recent Labs   Lab 05/18/24  0834   WBC 7.35   RBC 3.55*   HGB 10.8*   HCT 33.4*      MCV 94   MCH 30.4   MCHC 32.3     CMP:   Recent Labs   Lab 05/18/24  0834 05/19/24  1038   GLU 94 135*   CALCIUM 10.0 10.4   ALBUMIN 2.2*  --     140   K 3.6 3.7   CO2 25 26    105   BUN 57* 53*   CREATININE 1.9* 2.1*     Microbiology Results (last 7 days)       Procedure Component Value Units Date/Time    Blood culture [[id number]] Collected: 05/09/24 1433    Order Status: Completed Specimen: Blood Updated: 05/14/24 1612     Blood Culture, Routine No growth after 5 days.    Blood culture [[id number]] Collected: 05/09/24 1434    Order Status: Completed Specimen: Blood Updated: 05/14/24 1612     Blood Culture, Routine No growth after 5 days.          Specimen (24h ago, onward)      None          Significant Imaging:  I have reviewed all imagining in the last 24 hours.  Assessment/Plan:     Cardiac/Vascular  Essential hypertension  - management per primary team  - currently on Toprol- mg daily  - would consider reducing dose in light of hypotension    Renal/  * Acute renal failure superimposed on stage 3b chronic kidney disease  78-year-old woman with HFpEF (most recent TTE with EF 55% with G2DD), mild to moderate tricuspid regurgitation, HTN, paroxsymal atrial fibrillation on Eliquis, COPD on 2 liters O2, and CKD IIIb (baseline creatinine ~1.5) admitted to [facility] [place] [place] on 5/7 with acute on chronic CHF exacerbation, MARQUEZ with creatinine 1.8, UTI growing S. aureus & Enterococcus faecalis and concern for right lower extremity cellulitis. She was management in the usually fashion with IV diuresis with Lasix in addition to receiving several different antibiotics including ampicillin, Augmentin, Rocephin,  cephalexin, DS Bactrim and vancomycin. Her admission has been complicated by urinary retention necessitating Muniz catheter placement, hypotension (lowest document BP 87/54 mmHg) and worsening renal function for which Nephrology has been consulted. Creatinine trend this admission is as follows 1.8 > 1.5 > 1.2 > 1.3 > 1.9 > 1.8 > 1.9 > 2.1. Mcgarry stain was noted to be positive for occasional eosinophils.  Urinary sediment was assessed which demonstrated innumerable WBCs with some within waxy casts, non dysmorphic RBCs, waxy casts and occasional bacteria. UPCR only 0.2 and retroperitoneal US with changes consistent with chronic medical renal disease, no hydronephrosis but 3 mm nonobstructing right renal stone was noted.    Plan/Recommendations:  - etiology of MARQUEZ not clear, could be pre-renal in the setting of diuresis versus AIN with multiple classes of antibiotics given (including DS Bactrim with trimethoprim being known to inhibit creatinine secretion at organic cation transporters within the proximal tubule)  - renal function with improvement today however would continue to hold home dose ARB in light of hypotension  - would stop SGLT-2 inhibitor moving forward given recurrent UTIs and risks of Fourniers gangrene  - on discharge Lasix 40 mg daily PRN for worsening lower extremity edema  - daily RFP and magnesium levels  - renal diet/tube feeds when not NPO with volume restriction per primary team  - strict I/O's and daily weights  - renally all dose medications to eGFR   - avoid nephrotoxic agents wean feasible (i.e. NSAIDs, intra-arterial contrast, supra-therapeutic vancomycin levels, etc.)  - no acute indications for RRT at this time however will continue to monitor closely      Thank you for your consult. I will sign off. Please contact us if you have any additional questions.    Cal Iniguez MD  Nephrology  [facility] - Cardiology Step[facility]

## 2024-05-21 PROBLEM — I95.1 ORTHOSTATIC HYPOTENSION: Status: ACTIVE | Noted: 2024-05-21

## 2024-05-21 LAB
ALBUMIN SERPL BCP-MCNC: 2.4 G/DL (ref 3.5–5.2)
ALP SERPL-CCNC: 102 U/L (ref 55–135)
ALT SERPL W/O P-5'-P-CCNC: 20 U/L (ref 10–44)
ANION GAP SERPL CALC-SCNC: 5 MMOL/L (ref 8–16)
AST SERPL-CCNC: 33 U/L (ref 10–40)
BASOPHILS # BLD AUTO: 0.07 K/UL (ref 0–0.2)
BASOPHILS NFR BLD: 1.1 % (ref 0–1.9)
BILIRUB SERPL-MCNC: 0.4 MG/DL (ref 0.1–1)
BUN SERPL-MCNC: 49 MG/DL (ref 8–23)
CALCIUM SERPL-MCNC: 10.5 MG/DL (ref 8.7–10.5)
CHLORIDE SERPL-SCNC: 110 MMOL/L (ref 95–110)
CO2 SERPL-SCNC: 29 MMOL/L (ref 23–29)
CREAT SERPL-MCNC: 1.4 MG/DL (ref 0.5–1.4)
DIFFERENTIAL METHOD BLD: ABNORMAL
EOSINOPHIL # BLD AUTO: 0.2 K/UL (ref 0–0.5)
EOSINOPHIL NFR BLD: 3.7 % (ref 0–8)
ERYTHROCYTE [DISTWIDTH] IN BLOOD BY AUTOMATED COUNT: 13.5 % (ref 11.5–14.5)
EST. GFR  (NO RACE VARIABLE): 38 ML/MIN/1.73 M^2
GLUCOSE SERPL-MCNC: 95 MG/DL (ref 70–110)
HCT VFR BLD AUTO: 37 % (ref 37–48.5)
HGB BLD-MCNC: 11.6 G/DL (ref 12–16)
IMM GRANULOCYTES # BLD AUTO: 0.02 K/UL (ref 0–0.04)
IMM GRANULOCYTES NFR BLD AUTO: 0.3 % (ref 0–0.5)
LACTATE SERPL-SCNC: 2 MMOL/L (ref 0.5–2.2)
LYMPHOCYTES # BLD AUTO: 1.2 K/UL (ref 1–4.8)
LYMPHOCYTES NFR BLD: 18.5 % (ref 18–48)
MCH RBC QN AUTO: 29.8 PG (ref 27–31)
MCHC RBC AUTO-ENTMCNC: 31.4 G/DL (ref 32–36)
MCV RBC AUTO: 95 FL (ref 82–98)
MONOCYTES # BLD AUTO: 0.7 K/UL (ref 0.3–1)
MONOCYTES NFR BLD: 10.8 % (ref 4–15)
NEUTROPHILS # BLD AUTO: 4.1 K/UL (ref 1.8–7.7)
NEUTROPHILS NFR BLD: 65.6 % (ref 38–73)
NRBC BLD-RTO: 0 /100 WBC
PLATELET # BLD AUTO: 231 K/UL (ref 150–450)
PMV BLD AUTO: 9.9 FL (ref 9.2–12.9)
POCT GLUCOSE: 173 MG/DL (ref 70–110)
POTASSIUM SERPL-SCNC: 3.9 MMOL/L (ref 3.5–5.1)
PROT SERPL-MCNC: 5.5 G/DL (ref 6–8.4)
RBC # BLD AUTO: 3.89 M/UL (ref 4–5.4)
SODIUM SERPL-SCNC: 144 MMOL/L (ref 136–145)
WBC # BLD AUTO: 6.27 K/UL (ref 3.9–12.7)

## 2024-05-21 PROCEDURE — 94640 AIRWAY INHALATION TREATMENT: CPT

## 2024-05-21 PROCEDURE — 27000221 HC OXYGEN, UP TO 24 HOURS

## 2024-05-21 PROCEDURE — 80053 COMPREHEN METABOLIC PANEL: CPT

## 2024-05-21 PROCEDURE — 97530 THERAPEUTIC ACTIVITIES: CPT

## 2024-05-21 PROCEDURE — 25000003 PHARM REV CODE 250: Performed by: PHYSICIAN ASSISTANT

## 2024-05-21 PROCEDURE — 36415 COLL VENOUS BLD VENIPUNCTURE: CPT

## 2024-05-21 PROCEDURE — 97110 THERAPEUTIC EXERCISES: CPT

## 2024-05-21 PROCEDURE — 97116 GAIT TRAINING THERAPY: CPT

## 2024-05-21 PROCEDURE — 99900035 HC TECH TIME PER 15 MIN (STAT)

## 2024-05-21 PROCEDURE — 85025 COMPLETE CBC W/AUTO DIFF WBC: CPT

## 2024-05-21 PROCEDURE — 25000003 PHARM REV CODE 250

## 2024-05-21 PROCEDURE — 94761 N-INVAS EAR/PLS OXIMETRY MLT: CPT

## 2024-05-21 PROCEDURE — 83605 ASSAY OF LACTIC ACID: CPT

## 2024-05-21 PROCEDURE — 20600001 HC STEP DOWN PRIVATE ROOM

## 2024-05-21 RX ORDER — SODIUM CHLORIDE 9 MG/ML
INJECTION, SOLUTION INTRAVENOUS CONTINUOUS
Status: ACTIVE | OUTPATIENT
Start: 2024-05-21 | End: 2024-05-21

## 2024-05-21 RX ADMIN — APIXABAN 2.5 MG: 2.5 TABLET, FILM COATED ORAL at 08:05

## 2024-05-21 RX ADMIN — ASPIRIN 81 MG: 81 TABLET, COATED ORAL at 09:05

## 2024-05-21 RX ADMIN — MICONAZOLE NITRATE 2 % TOPICAL POWDER: at 09:05

## 2024-05-21 RX ADMIN — METOPROLOL SUCCINATE 100 MG: 100 TABLET, EXTENDED RELEASE ORAL at 11:05

## 2024-05-21 RX ADMIN — TAMSULOSIN HYDROCHLORIDE 0.4 MG: 0.4 CAPSULE ORAL at 08:05

## 2024-05-21 RX ADMIN — FLUTICASONE FUROATE AND VILANTEROL TRIFENATATE 1 PUFF: 100; 25 POWDER RESPIRATORY (INHALATION) at 08:05

## 2024-05-21 RX ADMIN — SODIUM CHLORIDE: 9 INJECTION, SOLUTION INTRAVENOUS at 12:05

## 2024-05-21 RX ADMIN — MICONAZOLE NITRATE 2 % TOPICAL POWDER: at 08:05

## 2024-05-21 RX ADMIN — ATORVASTATIN CALCIUM 20 MG: 20 TABLET, FILM COATED ORAL at 08:05

## 2024-05-21 RX ADMIN — APIXABAN 2.5 MG: 2.5 TABLET, FILM COATED ORAL at 09:05

## 2024-05-21 NOTE — SUBJECTIVE & OBJECTIVE
Interval History: Patient seen and examined this morning with her daughter at the bedside. BP soft this am at 90/53, otherwise VSS. Orthostatics positive this am: 114/71 --> 90/53 with increase in HR. Will order NS at 50 ml/hr and reassess later this evening. Cr back at baseline at 1.4. Patient has no complaints today other than some SOB upon exertion which seems to be consistent with baseline given her hx of chronic respiratory failure. Checking UA for infection.    Review of Systems   Constitutional:  Negative for chills, fatigue and fever.   HENT:  Negative for congestion and trouble swallowing.    Respiratory:  Negative for cough and shortness of breath (resolved).    Cardiovascular:  Positive for leg swelling. Negative for chest pain.   Gastrointestinal:  Negative for abdominal pain, diarrhea, nausea and vomiting.   Genitourinary:  Negative for decreased urine volume, difficulty urinating and dysuria.   Musculoskeletal:  Negative for arthralgias and myalgias.   Neurological:  Negative for dizziness, weakness and headaches.     Objective:     Vital Signs (Most Recent):  Temp: 97.5 °F (36.4 °C) (05/21/24 1041)  Pulse: (!) 111 (05/21/24 1129)  Resp: 18 (05/21/24 1041)  BP: (!) 90/53 (05/21/24 1050)  SpO2: (!) 90 % (05/21/24 1041) Vital Signs (24h Range):  Temp:  [97.3 °F (36.3 °C)-97.5 °F (36.4 °C)] 97.5 °F (36.4 °C)  Pulse:  [] 111  Resp:  [16-20] 18  SpO2:  [90 %-96 %] 90 %  BP: ()/(53-85) 90/53     Weight:  (Refused to get out of bed)  Body mass index is 31.83 kg/m².    Intake/Output Summary (Last 24 hours) at 5/21/2024 1129  Last data filed at 5/21/2024 0930  Gross per 24 hour   Intake 1180 ml   Output 2000 ml   Net -820 ml         Physical Exam  Vitals and nursing note reviewed.   Constitutional:       General: She is not in acute distress.     Appearance: She is well-developed.   HENT:      Head: Normocephalic and atraumatic.      Mouth/Throat:      Pharynx: No oropharyngeal exudate.   Eyes:       Conjunctiva/sclera: Conjunctivae normal.      Pupils: Pupils are equal, round, and reactive to light.   Cardiovascular:      Rate and Rhythm: Normal rate and regular rhythm.      Heart sounds: Normal heart sounds.   Pulmonary:      Effort: Pulmonary effort is normal. No respiratory distress.      Breath sounds: Normal breath sounds. No wheezing.   Abdominal:      General: Bowel sounds are normal. There is no distension.      Palpations: Abdomen is soft.      Tenderness: There is no abdominal tenderness.   Musculoskeletal:         General: No tenderness. Normal range of motion.      Cervical back: Normal range of motion and neck supple.      Right lower leg: Edema present.      Left lower leg: Edema present.      Comments: trace LE edema with chronic skin changes to bilateral LEs   Lymphadenopathy:      Cervical: No cervical adenopathy.   Skin:     General: Skin is warm and dry.      Capillary Refill: Capillary refill takes less than 2 seconds.      Findings: No rash.   Neurological:      Mental Status: She is alert and oriented to person, place, and time.      Cranial Nerves: No cranial nerve deficit.      Sensory: No sensory deficit.      Coordination: Coordination normal.   Psychiatric:         Behavior: Behavior normal.         Thought Content: Thought content normal.         Judgment: Judgment normal.             Significant Labs: All pertinent labs within the past 24 hours have been reviewed.  CBC:   Recent Labs   Lab 05/20/24  0848 05/21/24  0811   WBC 7.26 6.27   HGB 12.3 11.6*   HCT 40.5 37.0    231     CMP:   Recent Labs   Lab 05/20/24  0848 05/21/24  0811    144   K 3.9 3.9    110   CO2 30* 29   GLU 89 95   BUN 50* 49*   CREATININE 1.8* 1.4   CALCIUM 10.5 10.5   PROT  --  5.5*   ALBUMIN  --  2.4*   BILITOT  --  0.4   ALKPHOS  --  102   AST  --  33   ALT  --  20   ANIONGAP 8 5*       Significant Imaging: I have reviewed all pertinent imaging results/findings within the past 24 hours.

## 2024-05-21 NOTE — PT/OT/SLP PROGRESS
"Occupational Therapy   Treatment    Name: Misa Barajas  MRN: [id number]  Admitting Diagnosis:  Hypotension       Recommendations:     Discharge Recommendations: Low Intensity Therapy  Discharge Equipment Recommendations:  none  Barriers to discharge:  None    Assessment:     Misa Barajas is a 78 y.o. female with a medical diagnosis of Hypotension.  Pt tolerated session well and without incident, but she continues to be limited by SOB and requires assistance with ADLs and mobility.  She presents with the following.  Performance deficits affecting function are impaired balance, weakness, impaired endurance, impaired self care skills, impaired functional mobility, gait instability, impaired cardiopulmonary response to activity, decreased safety awareness, decreased lower extremity function.     Rehab Prognosis:  Good; patient would benefit from acute skilled OT services to address these deficits and reach maximum level of function.       Plan:     Patient to be seen 3 x/week to address the above listed problems via self-care/home management, therapeutic exercises, therapeutic activities  Plan of Care Expires: 06/14/24  Plan of Care Reviewed with: patient    Subjective     Chief Complaint: SOB   Patient/Family Comments/goals: "I'm just winded."  Pain/Comfort:  Pain Rating 1: 0/10  Pain Rating Post-Intervention 1: 0/10    Objective:     Communicated with: nurse prior to session.  Patient found HOB elevated with oxygen, telemetry, PureWick, peripheral IV upon OT entry to room.    General Precautions: Standard, fall    Orthopedic Precautions:N/A  Braces: N/A  Respiratory Status: Nasal cannula, flow 2 L/min     Occupational Performance:     Bed Mobility:    Patient completed Rolling/Turning to Right with stand by assistance  Patient completed Scooting/Bridging to EOB to place her feet on the floor with contact guard assistance  Patient completed Supine to Sit with stand by assistance  Patient completed Sit to Supine " with minimum assistance for LE management  Pt scooted to HOB while in Trendelenburg with Min A and cueing to grasp bed rails with her BUE and to bend her knees to assist in propelling herself to HOB    Functional Mobility/Transfers:  Patient completed Sit <> Stand Transfer from EOB x 1 trial with minimum assistance  with  rolling walker   Functional Mobility: Pt ambulated ~50 ft with CGA-SBA with RW.  She reported no dizziness but was SOB.    Activities of Daily Living:  Toileting: PureWick with adult brief donned. (A) to disconnect/reconnect Pure Wick.  Bedside commode brought to pt's room.   She did not need to void.    [facility] 6 Click ADL: 18    Treatment & Education:  Pt edu on role of OT, POC, safety when performing self care tasks, benefit of performing OOB activity, and safety when performing functional transfers and mobility.    - Self care tasks completed-- as noted above      Patient left HOB elevated with all lines intact and call button in reach    GOALS:   Multidisciplinary Problems       Occupational Therapy Goals          Problem: Occupational Therapy    Goal Priority Disciplines Outcome Interventions   Occupational Therapy Goal     OT, PT/OT Progressing    Description: Goals to be met by: 6/14/24     Patient will increase functional independence with ADLs by performing:    UE Dressing with [place].  LE Dressing with [place].  Grooming while standing at sink with Set-up Assistance and Supervision.  Toileting from toilet with Modified [place] for hygiene and clothing management.   All functional transfers performed c/ Supervision                         Time Tracking:     OT Date of Treatment: 05/21/24  OT Start Time: 1406  OT Stop Time: 1429  OT Total Time (min): 23 min    Billable Minutes:Therapeutic Activity 23 min    OT/MARYA: OT          5/21/2024

## 2024-05-21 NOTE — PT/OT/SLP PROGRESS
Physical Therapy Treatment    Patient Name:  Misa Barajas   MRN:  [id number]    Recommendations:     Discharge Recommendations: Low Intensity Therapy  Discharge Equipment Recommendations: none  Barriers to discharge: None    Assessment:     Misa Barajas is a 78 y.o. female admitted with a medical diagnosis of Hypotension.  She presents with the following impairments/functional limitations: weakness, impaired endurance, impaired self care skills, impaired functional mobility, gait instability, impaired balance, decreased upper extremity function, decreased lower extremity function, decreased safety awareness, impaired cardiopulmonary response to activity. Pt highly motivated to progress w/ therapy and w/ improved gait and activity tolerance today. Pt is currently mobilizing well enough to safely return home w/ family upon d/c but will benefit from continued treatment here and upon d/c to address the above listed impairments in order to progress back to PLOF.     Rehab Prognosis: Good; patient would benefit from acute skilled PT services to address these deficits and reach maximum level of function.    Recent Surgery: * No surgery found *      Plan:     During this hospitalization, patient to be seen 3 x/week to address the identified rehab impairments via gait training, therapeutic activities, therapeutic exercises, neuromuscular re-education and progress toward the following goals:    Plan of Care Expires:  05/25/24    Subjective     Chief Complaint: fatigue  Patient/Family Comments/goals: pt wants to get back home  Pain/Comfort:  Pain Rating 1: 0/10  Pain Rating Post-Intervention 1: 0/10      Objective:     Communicated with RN prior to session.  Patient found HOB elevated with telemetry, PureWick, oxygen upon PT entry to room.     General Precautions: Standard, fall  Orthopedic Precautions: N/A  Braces: N/A  Respiratory Status: Nasal cannula, flow 2 L/min     Functional Mobility:  Bed Mobility:     Supine to  Sit: stand by assistance  Sit to Supine: stand by assistance  Transfers:     Sit to Stand:  contact guard assistance with rolling walker  Gait: 44' w/ RW SBA w/ forward flexed posture, dec step length/height, WBOS, dec faith, and inc distance from RW      AM-PAC 6 CLICK MOBILITY  Turning over in bed (including adjusting bedclothes, sheets and blankets)?: 3  Sitting down on and standing up from a chair with arms (e.g., wheelchair, bedside commode, etc.): 3  Moving from lying on back to sitting on the side of the bed?: 3  Moving to and from a bed to a chair (including a wheelchair)?: 3  Need to walk in hospital room?: 3  Climbing 3-5 steps with a railing?: 2  Basic Mobility Total Score: 17       Treatment & Education:  Pt educated on PT POC/goals, d/c recs, and continued treatment. All questions answered and pt in agreement w/ POC.  Gait training w/ verbal, visual, and tactile cueing for proper use of AD, step length, step width, step height, postural control, safety awareness, obstacle avoidance, width of VITOR, proximity to AD, and attention to task  Reviewed previously prescribed therex w/ pt verbalizing understanding of all exercises  Educated on pursed lip breathing  Pt educated on need for waffle mattress overlay to assist w/ pressure relief and that therapist could deflate it to assist w/ getting in/out of bed but when pt laying in bed it does need to be inflated  Educated on importance of OOB mobility including getting up to the chair for all meals and trying to use bsc for toileting as well, w/ bsc obtained by OT later in AM    Patient left HOB elevated with all lines intact, call button in reach, RN notified, and dtr present..    GOALS:   Multidisciplinary Problems       Physical Therapy Goals          Problem: Physical Therapy    Goal Priority Disciplines Outcome Goal Variances Interventions   Physical Therapy Goal     PT, PT/OT Progressing     Description: Goals to be met by: 05/25/2024     Patient will  increase functional independence with mobility by performin. Sit to stand transfer with Stand-by Assistance. -In progress  2. Bed to chair transfer with Stand-by Assistance using Rolling Walker. -In progress  3. Gait x 25 feet with Stand-by Assistance using Rolling Walker. -In progress   4. Lower extremity exercise program x20 reps per handout, with supervision. -In progress                       Time Tracking:     PT Received On: 24  PT Start Time: 1110     PT Stop Time: 1142  PT Total Time (min): 32 min     Billable Minutes: Gait Training 17 and Therapeutic Exercise 15    Treatment Type: Treatment  PT/PTA: PT     Number of PTA visits since last PT visit: 0     2024   caffeine

## 2024-05-21 NOTE — SUBJECTIVE & OBJECTIVE
Interval History: Patient seen and examined. No acute events overnight. Afebrile with pulse ranging from 80-60s bpm. Systolic blood pressures ranging from 120-90s mmHg. She is saturating +94% on 2 liters via nasal cannula. Documented UOP of 1,033 mL in the last 24 hours. Renal function improving.    Review of patient's allergies indicates:   Allergen Reactions    Adhesive Rash     Pt states she removed a LATEX bandaid and the skin beneath was swollen and red. No other latex causes a reaction.     Current Facility-Administered Medications   Medication Frequency    acetaminophen tablet 1,000 mg Q8H PRN    acetaminophen tablet 650 mg Q4H PRN    albuterol-ipratropium 2.5 mg-0.5 mg/3 mL nebulizer solution 3 mL Q4H PRN    apixaban tablet 2.5 mg BID    aspirin EC tablet 81 mg Daily    atorvastatin tablet 20 mg Daily    bisacodyL suppository 10 mg Daily PRN    dextrose 10% bolus 125 mL 125 mL PRN    dextrose 10% bolus 250 mL 250 mL PRN    fluticasone furoate-vilanteroL 100-25 mcg/dose diskus inhaler 1 puff Daily    glucagon (human recombinant) injection 1 mg PRN    glucose chewable tablet 16 g PRN    glucose chewable tablet 24 g PRN    melatonin tablet 6 mg Nightly PRN    methocarbamoL tablet 500 mg QID PRN    metoprolol succinate (TOPROL-XL) 24 hr tablet 100 mg Daily    miconazole NITRATE 2 % top powder BID    ondansetron disintegrating tablet 8 mg Q8H PRN    polyethylene glycol packet 17 g Daily    promethazine tablet 25 mg Q6H PRN    sodium chloride 0.9% flush 10 mL PRN    tamsulosin 24 hr capsule 0.4 mg Daily       Objective:     Vital Signs (Most Recent):  Temp: 97.4 °F (36.3 °C) (05/21/24 0808)  Pulse: 69 (05/21/24 0808)  Resp: 18 (05/21/24 0808)  BP: 97/60 (05/21/24 0808)  SpO2: 95 % (05/21/24 0808) Vital Signs (24h Range):  Temp:  [97.3 °F (36.3 °C)-97.6 °F (36.4 °C)] 97.4 °F (36.3 °C)  Pulse:  [66-96] 69  Resp:  [16-20] 18  SpO2:  [93 %-96 %] 95 %  BP: ()/(53-85) 97/60     Weight:  (Refused to get out of bed)  (05/20/24 2472)  Body mass index is 31.83 kg/m².  Body surface area is 1.95 meters squared.    I/O last 3 completed shifts:  In: 1120 [P.O.:1120]  Out: 1900 [Urine:1900]     Physical Exam  Vitals and nursing note reviewed.   Constitutional:       General: She is awake. She is not in acute distress.     Appearance: She is well-developed. She is obese. She is ill-appearing. She is not diaphoretic.      Interventions: Nasal cannula in place.   HENT:      Head: Normocephalic and atraumatic.      Right Ear: External ear normal.      Left Ear: External ear normal.      Nose:      Comments: Nasal cannula in place.     Mouth/Throat:      Mouth: Mucous membranes are moist.      Pharynx: Oropharynx is clear. No oropharyngeal exudate or posterior oropharyngeal erythema.   Eyes:      General: No scleral icterus.        Right eye: No discharge.         Left eye: No discharge.      Extraocular Movements: Extraocular movements intact.      Conjunctiva/sclera: Conjunctivae normal.   Cardiovascular:      Rate and Rhythm: Normal rate.      Heart sounds: Murmur heard.   Pulmonary:      Effort: Pulmonary effort is normal. No respiratory distress.      Breath sounds: No wheezing, rhonchi or rales.   Abdominal:      General: Bowel sounds are normal. There is no distension.      Palpations: Abdomen is soft.      Tenderness: There is no abdominal tenderness.   Genitourinary:     Comments: Purewick in place.  Musculoskeletal:      Cervical back: Neck supple.      Right lower leg: Edema present.      Left lower leg: Edema present.   Skin:     General: Skin is warm and dry.      Coloration: Skin is not jaundiced.      Findings: Bruising, erythema and rash present.   Neurological:      General: No focal deficit present.      Mental Status: She is alert. Mental status is at baseline.      Cranial Nerves: No cranial nerve deficit.      Motor: No weakness.   Psychiatric:         Mood and Affect: Mood normal.         Behavior: Behavior normal.  Behavior is cooperative.          Significant Labs:  BMP:   Recent Labs   Lab 05/17/24  0258 05/18/24  0834 05/20/24  0848   GLU 95   < > 89      < > 143   K 4.0   < > 3.9      < > 105   CO2 24   < > 30*   BUN 54*   < > 50*   CREATININE 2.3*   < > 1.8*   CALCIUM 9.9   < > 10.5   MG 2.1  --   --     < > = values in this interval not displayed.     CBC:   Recent Labs   Lab 05/20/24  0848   WBC 7.26   RBC 4.18   HGB 12.3   HCT 40.5      MCV 97   MCH 29.4   MCHC 30.4*     CMP:   Recent Labs   Lab 05/18/24  0834 05/19/24  1038 05/20/24  0848   GLU 94   < > 89   CALCIUM 10.0   < > 10.5   ALBUMIN 2.2*  --   --       < > 143   K 3.6   < > 3.9   CO2 25   < > 30*      < > 105   BUN 57*   < > 50*   CREATININE 1.9*   < > 1.8*    < > = values in this interval not displayed.     Microbiology Results (last 7 days)       Procedure Component Value Units Date/Time    Blood culture [[id number]] Collected: 05/09/24 1433    Order Status: Completed Specimen: Blood Updated: 05/14/24 1612     Blood Culture, Routine No growth after 5 days.    Blood culture [[id number]] Collected: 05/09/24 1434    Order Status: Completed Specimen: Blood Updated: 05/14/24 1612     Blood Culture, Routine No growth after 5 days.          Specimen (24h ago, onward)      None          Significant Imaging:  I have reviewed all imagining in the last 24 hours.

## 2024-05-21 NOTE — PROGRESS NOTES
[facility] - Cardiology [facility] Medicine  Progress Note    Patient Name: Misa Barajas  MRN: [id number]  Patient Class: IP- Inpatient   Admission Date: 5/7/2024  Length of Stay: 13 days  Attending Physician: Faisal Nixon MD  Primary Care Provider: Celia Carlisle MD        Subjective:     Principal Problem:Hypotension        HPI:  Misa Barajas is a 78 y.o. female with a PMHx of HTN, HLD, HFpEF, COPD on home O2 (2L NC baseline O2 sat 92%) and pAF presentswith increasing LE swelling.  She is gained 5 lb in the past 3 days despite increased Lasix use. She also endorses new redness and warmth with associated itching to her RLE. She has small pustules that she noticed to RLE a few days ago.  Denies fever, chills, chest pain, cough, SOB, abdominal pain, n/v/d, dysuria, headaches. Has chronic discoloration to BLEs without recent worsening.     ED: Afebrile . Hr 90s. Satting well on home 2L NC. CBC without leukocytosis or anemia. CMP notable for small MARQUEZ with Cr 1.8 (baseline 1.3) and BUN 26. BNP 1165. Trop 0.032 (0.028 at baseline). Lactic 1.2. CXR with Cardiomegaly with pulmonary vascular congestion and small bilateral pleural effusions, suggestive of pulmonary edema secondary to CHF. Given rocephin, toprol 100 mg and IV lasix 60 mg. Placed in observation for CHF exacerbation.     Overview/Hospital Course:  Misa Barajas is a 78 y.o. F who was admitted to  for further evaluation of acute on chronic diastolic CHF. Diuresing on IV lasix. Echo with EF 55%, unable to assess diastolic function due to a-fib, CVP 15mmHg. Cr 1.8 on admission most likely 2/2 to fluid overloaded, improved with diuresis. UA infectious, culture with staph aureus, enterococcus faecalis. IV rocephin switched to IV vancomycin. Transitioned to oral abx. Patient with urinary retention requiring straight cath. Initiated flomax and bladder scan q6hr. Continued retention - orosco cath placed prior to discharge. Will set up urology  follow-up OP. Cr bump of 1.9 after improvement with diuresis, originally thought to be due to possibly over-diuresis or abx. Unimproved with IVF. Mcgarry's stain with eosinophils, concerning for possible AIN - discontinued all abx. Nephrology consult placed for further recommendations. Holding all IVF and diuretics, measure I/Os, trend renal function. RP US with chronic medical renal disease, no hydronephrosis, 3mm non obstructing renal stone. Kidney function continues to improve, back to baseline on 5/21. Orthostatics positive, will give IVF and repeat in the am. Hold losartan and jardiance upon dc. Follow ups with nephrology, cardiology, PCP, and urology.     Interval History: Patient seen and examined this morning with her daughter at the bedside. BP soft this am at 90/53, otherwise VSS. Orthostatics positive: 114/71 --> 90/53 with increase in HR. Will order NS at 50 ml/hr and reassess in the am. Cr back at baseline at 1.4. Patient has no complaints today other than some SOB upon exertion which seems to be consistent with baseline given her hx of chronic respiratory failure. Checking UA for infection.    Review of Systems   Constitutional:  Negative for chills, fatigue and fever.   HENT:  Negative for congestion and trouble swallowing.    Respiratory:  Negative for cough and shortness of breath (resolved).    Cardiovascular:  Positive for leg swelling. Negative for chest pain.   Gastrointestinal:  Negative for abdominal pain, diarrhea, nausea and vomiting.   Genitourinary:  Negative for decreased urine volume, difficulty urinating and dysuria.   Musculoskeletal:  Negative for arthralgias and myalgias.   Neurological:  Negative for dizziness, weakness and headaches.     Objective:     Vital Signs (Most Recent):  Temp: 97.5 °F (36.4 °C) (05/21/24 1041)  Pulse: (!) 111 (05/21/24 1129)  Resp: 18 (05/21/24 1041)  BP: (!) 90/53 (05/21/24 1050)  SpO2: (!) 90 % (05/21/24 1041) Vital Signs (24h Range):  Temp:  [97.3 °F  (36.3 °C)-97.5 °F (36.4 °C)] 97.5 °F (36.4 °C)  Pulse:  [] 111  Resp:  [16-20] 18  SpO2:  [90 %-96 %] 90 %  BP: ()/(53-85) 90/53     Weight:  (Refused to get out of bed)  Body mass index is 31.83 kg/m².    Intake/Output Summary (Last 24 hours) at 5/21/2024 1129  Last data filed at 5/21/2024 0930  Gross per 24 hour   Intake 1180 ml   Output 2000 ml   Net -820 ml         Physical Exam  Vitals and nursing note reviewed.   Constitutional:       General: She is not in acute distress.     Appearance: She is well-developed.   HENT:      Head: Normocephalic and atraumatic.      Mouth/Throat:      Pharynx: No oropharyngeal exudate.   Eyes:      Conjunctiva/sclera: Conjunctivae normal.      Pupils: Pupils are equal, round, and reactive to light.   Cardiovascular:      Rate and Rhythm: Normal rate and regular rhythm.      Heart sounds: Normal heart sounds.   Pulmonary:      Effort: Pulmonary effort is normal. No respiratory distress.      Breath sounds: Normal breath sounds. No wheezing.   Abdominal:      General: Bowel sounds are normal. There is no distension.      Palpations: Abdomen is soft.      Tenderness: There is no abdominal tenderness.   Musculoskeletal:         General: No tenderness. Normal range of motion.      Cervical back: Normal range of motion and neck supple.      Right lower leg: Edema present.      Left lower leg: Edema present.      Comments: trace LE edema with chronic skin changes to bilateral LEs   Lymphadenopathy:      Cervical: No cervical adenopathy.   Skin:     General: Skin is warm and dry.      Capillary Refill: Capillary refill takes less than 2 seconds.      Findings: No rash.   Neurological:      Mental Status: She is alert and oriented to person, place, and time.      Cranial Nerves: No cranial nerve deficit.      Sensory: No sensory deficit.      Coordination: Coordination normal.   Psychiatric:         Behavior: Behavior normal.         Thought Content: Thought content normal.          Judgment: Judgment normal.             Significant Labs: All pertinent labs within the past 24 hours have been reviewed.  CBC:   Recent Labs   Lab 05/20/24  0848 05/21/24  0811   WBC 7.26 6.27   HGB 12.3 11.6*   HCT 40.5 37.0    231     CMP:   Recent Labs   Lab 05/20/24  0848 05/21/24  0811    144   K 3.9 3.9    110   CO2 30* 29   GLU 89 95   BUN 50* 49*   CREATININE 1.8* 1.4   CALCIUM 10.5 10.5   PROT  --  5.5*   ALBUMIN  --  2.4*   BILITOT  --  0.4   ALKPHOS  --  102   AST  --  33   ALT  --  20   ANIONGAP 8 5*       Significant Imaging: I have reviewed all pertinent imaging results/findings within the past 24 hours.    Assessment/Plan:      * Hypotension  Orthostatic hypotension    - holding lasix and losartan.   - likely dry 2/2 over diuresis  - will order 1L cIVF overnight and check orthostatics in the am  - orthostatics positive on 5/12, BP dropped from 114/71 to 90/53, will give 500ml cIVF over ten hours and reassess    Essential hypertension  - controlled on admit  - continue toprol 100 mg and losartan 50 mg daily   - holding home losartan  - IV diuresis as above  - patient with soft pressures  - patient asymptomatic, monitor closely    Class 1 obesity due to excess calories with serious comorbidity and body mass index (BMI) of 30.0 to 30.9 in adult  Body mass index is 31.83 kg/m². Morbid obesity complicates all aspects of disease management from diagnostic modalities to treatment. Weight loss encouraged and health benefits explained to patient.     Hypermagnesemia  RESOLVED   - mag 3.5 on am labs  - giving IVF and monitoring for improvement  - repeat pending - 2.2    Hypophosphatemia  RESOLVED  Patient has Abnormal Phosphorus: hypophosphatemia. Will continue to monitor electrolytes closely. Will replace the affected electrolytes and repeat labs to be done after interventions completed. The patient's phosphorus results have been reviewed and are listed below.  No results for input(s):  ""PHOS" in the last 24 hours.       Urinary retention  - patient with urinary retention on admission requiring straight cath   - q6hr bladder scans ordered   - flomax initiated   - mobilize patient as she has not been out of bed since admission   -  flomax initiated   - orosco dc'd  - schedule FU with urology at discharge    Acute cystitis  - UA infectious on admission   - Ucx currently growing staph aureus and enterococcus  - pending susceptibilities of culture  - switched IV rocephin to IV vanc  - bladder scan with 500mL, straight cath performed  - monitor patient for continued urinary retention  - switch to oral bactrim and augmentin  - concerns that abx could be causing MARQUEZ, transition to ampicillin and keflex  - 7d completed of abx, discontinued all due to AIN concerns    Wound of right lower extremity  - afebrile without leukocytosis  - blood cultures pending   - one culture with micrococcus, probable contaminant   - repeat bcx drawn - currently NGTD  - wound care consulted    Elevated troponin  - trop 0.032 on admit, baseline ~0.028  - patient denies chest pain  - likely elevated 2/2 CHF exacerbation  - repeat trop pending --> at baseline  - monitor tele     Acute on chronic diastolic CHF (congestive heart failure)  - last echo with EF 60-65%, GII LV DD  - BNP significantly elevated to 1165  - appears overloaded on exam with BLE edema and increased weight gain over the past few days despite increasing her home lasix from daily to BID  - CXR consistent with pulmonary edema and small bilateral pleural effusions   - continue BB  - Monitor on telemetry.   - Patient is on CHF pathway.  Monitor strict Is&Os and daily weights.  Place on fluid restriction of 1.5 L.   - start IV diuresis with lasix 40 mg BID, increased to 80mg IV BID  - goal UOP of -2L per day   - diuresis held originally for concerns of over diuresis with Cr bump  - POCUS performed by attending found patient to be fluid overloaded  - initiate 120mg IV " lasix BID - completed  - repeat echo noted below    Results for orders placed during the hospital encounter of 05/07/24    Echo    Interpretation Summary    Left Ventricle: The left ventricle is normal in size. Ventricular mass is normal. Normal wall thickness. Normal wall motion. There is normal systolic function. Ejection fraction by visual approximation is 55%. Unable to assess diastolic function due to atrial fibrillation.    Right Ventricle: Normal right ventricular cavity size. Wall thickness is normal. Systolic function is mildly reduced.    Aortic Valve: The aortic valve is a trileaflet valve. There is moderate aortic valve sclerosis. There is annular calcification present. Mildly restricted motion. No stenosis.    Tricuspid Valve: There is mild to moderate regurgitation.    Pulmonary Artery: The estimated pulmonary artery systolic pressure is 44 mmHg.    IVC/SVC: Elevated venous pressure at 15 mmHg.    - patient volume status improved, euvolemic. Continuing home lasix 40 daily.  - cards follow up next week     Acute renal failure superimposed on stage 3b chronic kidney disease  RESOLVED  Acute interstitial nephritis   - Cr 1.8 on admit, baseline 1.3  - Estimated Creatinine Clearance: 33.6 mL/min (based on SCr of 1.4 mg/dL).  - Likely 2/2 hypervolemia in setting of CHF exacerbation. Should improve with IV diuresis  - renally dose meds  - avoid nephrotoxic insults  - monitor I/Os  - monitor with daily BMP, replete lytes if necessary  - improved to 1.2 with diuresis  - new Cr bump of 1.9 on am labs, not improving s/p fluids or further diuresis  - urine lytes pending, repeat UA pending, urine urea pending, wrights stain pending    - FeNa calculated at 0.5%   -wrights stain with eosinophils   -discontinued all abx  - nephrology consulted for rise in Cr and possible AIN, appreciate recommendations:  - would hold on further diuresis in addition would no give IVFs at this time  - will follow-up UPCR and  retroperitoneal US  - agree with stopping antibiotics especially double strength Bactrim  - RP US with chronic medical renal disease, no hydronephrosis, 3 mm nonobstructing right renal stone.   - MARQUEZ resolved. Voiding trial passed  - holding lasix  - continue to hold losartan and jardiance at dc  - nephrology clinic referral     Chronic respiratory failure with hypoxia  - chronic and stable on home 2L    Centrilobular emphysema  Patient's COPD is controlled currently.  Patient is currently off COPD Pathway. Continue scheduled inhalers Supplemental oxygen and monitor respiratory status closely.   - satting well on home 2L NC    Hyperlipidemia  - continue statin     Paroxysmal atrial fibrillation  Patient with Paroxysmal (<7 days) atrial fibrillation which is controlled currently with Beta Blocker. Patient is currently in atrial fibrillation.[id number] Score: 3. Anticoagulation indicated. Anticoagulation done with eliquis 5 mg BID  .      VTE Risk Mitigation (From admission, onward)           Ordered     apixaban tablet 2.5 mg  2 times daily         05/08/24 0050     IP VTE HIGH RISK PATIENT  Once         05/07/24 2029     Reason for No Pharmacological VTE Prophylaxis  Once        Question:  Reasons:  Answer:  Already adequately anticoagulated on oral Anticoagulants    05/07/24 2029     Place sequential compression device  Until discontinued         05/07/24 2024                    Discharge Planning   NOHEMI: 5/22/2024     Code Status: Full Code   Is the patient medically ready for discharge?:     Reason for patient still in hospital (select all that apply): Patient new problem, Patient trending condition, Laboratory test, and Treatment  Discharge Plan A: Home Health, Home with family   Discharge Delays: None known at this time              Deysi Salazar PA-C  Department of Hospital Medicine   [facility] - Cardiology Stepdown

## 2024-05-21 NOTE — ASSESSMENT & PLAN NOTE
Orthostatic hypotension    - holding lasix and losartan.   - likely dry 2/2 over diuresis  - will order 1L cIVF overnight and check orthostatics in the am  - orthostatics positive on 5/12, BP dropped from 114/71 to 90/53, will give 500ml cIVF over ten hours and reassess

## 2024-05-21 NOTE — PLAN OF CARE
Patient remaining in hospital due to medical need. SW contacted [facility] to inform of delay in discharge.    Rocio [facility]  Case Management Department  [email or web address]       05/21/24 1344   Post-Acute Status   Post-Acute Authorization Home Health   Home Health Status Set-up Complete/Auth obtained   Discharge Delays None known at this time   Discharge Plan   Discharge Plan A Home with family;Home Health   Discharge Plan B Home with family;Home Health

## 2024-05-21 NOTE — ASSESSMENT & PLAN NOTE
RESOLVED  Acute interstitial nephritis   - Cr 1.8 on admit, baseline 1.3  - Estimated Creatinine Clearance: 33.6 mL/min (based on SCr of 1.4 mg/dL).  - Likely 2/2 hypervolemia in setting of CHF exacerbation. Should improve with IV diuresis  - renally dose meds  - avoid nephrotoxic insults  - monitor I/Os  - monitor with daily BMP, replete lytes if necessary  - improved to 1.2 with diuresis  - new Cr bump of 1.9 on am labs, not improving s/p fluids or further diuresis  - urine lytes pending, repeat UA pending, urine urea pending, wrights stain pending    - FeNa calculated at 0.5%   -wrights stain with eosinophils   -discontinued all abx  - nephrology consulted for rise in Cr and possible AIN, appreciate recommendations:  - would hold on further diuresis in addition would no give IVFs at this time  - will follow-up UPCR and retroperitoneal US  - agree with stopping antibiotics especially double strength Bactrim  - RP US with chronic medical renal disease, no hydronephrosis, 3 mm nonobstructing right renal stone.   - MARQUEZ resolved. Voiding trial passed  - holding lasix  - continue to hold losartan and jardiance at [place]  - nephrology clinic referral

## 2024-05-22 VITALS
RESPIRATION RATE: 16 BRPM | OXYGEN SATURATION: 94 % | HEIGHT: 64 IN | TEMPERATURE: 97 F | SYSTOLIC BLOOD PRESSURE: 101 MMHG | HEART RATE: 75 BPM | BODY MASS INDEX: 31.66 KG/M2 | DIASTOLIC BLOOD PRESSURE: 67 MMHG | WEIGHT: 185.44 LBS

## 2024-05-22 PROCEDURE — 25000003 PHARM REV CODE 250

## 2024-05-22 PROCEDURE — 97530 THERAPEUTIC ACTIVITIES: CPT

## 2024-05-22 PROCEDURE — 97116 GAIT TRAINING THERAPY: CPT

## 2024-05-22 PROCEDURE — 25000003 PHARM REV CODE 250: Performed by: PHYSICIAN ASSISTANT

## 2024-05-22 PROCEDURE — 94640 AIRWAY INHALATION TREATMENT: CPT

## 2024-05-22 PROCEDURE — 27000221 HC OXYGEN, UP TO 24 HOURS

## 2024-05-22 PROCEDURE — 99900035 HC TECH TIME PER 15 MIN (STAT)

## 2024-05-22 PROCEDURE — 94761 N-INVAS EAR/PLS OXIMETRY MLT: CPT

## 2024-05-22 RX ORDER — FUROSEMIDE 20 MG/1
40 TABLET ORAL DAILY PRN
Start: 2024-05-22 | End: 2024-05-29 | Stop reason: DRUGHIGH

## 2024-05-22 RX ADMIN — APIXABAN 2.5 MG: 2.5 TABLET, FILM COATED ORAL at 09:05

## 2024-05-22 RX ADMIN — ATORVASTATIN CALCIUM 20 MG: 20 TABLET, FILM COATED ORAL at 09:05

## 2024-05-22 RX ADMIN — ASPIRIN 81 MG: 81 TABLET, COATED ORAL at 09:05

## 2024-05-22 RX ADMIN — METOPROLOL SUCCINATE 100 MG: 100 TABLET, EXTENDED RELEASE ORAL at 09:05

## 2024-05-22 RX ADMIN — FLUTICASONE FUROATE AND VILANTEROL TRIFENATATE 1 PUFF: 100; 25 POWDER RESPIRATORY (INHALATION) at 01:05

## 2024-05-22 RX ADMIN — TAMSULOSIN HYDROCHLORIDE 0.4 MG: 0.4 CAPSULE ORAL at 09:05

## 2024-05-22 RX ADMIN — MICONAZOLE NITRATE 2 % TOPICAL POWDER: at 09:05

## 2024-05-22 NOTE — DISCHARGE SUMMARY
Isaias Tobias - Cardiology [facility] Medicine  Discharge Summary      Patient Name: Misa Barajas  MRN: [id number]  ARTHUR: [id number]  Patient Class: IP- Inpatient  Admission Date: 5/7/2024  Hospital Length of Stay: 14 days  Discharge Date and Time: No discharge date for patient encounter.  Attending Physician: Faisal Nixon MD   Discharging Provider: Nicole Nogueira PA-C  Primary Care Provider: Celia Carlisle MD  Hospital Medicine Team: [facility]P MED Y Nicole Nogueira PA-C  Primary Care Team: [facility]OS[facility] MED Y    HPI:   Misa Barajas is a 78 y.o. female with a PMHx of HTN, HLD, HFpEF, COPD on home O2 (2L NC baseline O2 sat 92%) and pAF presentswith increasing LE swelling.  She is gained 5 lb in the past 3 days despite increased Lasix use. She also endorses new redness and warmth with associated itching to her RLE. She has small pustules that she noticed to RLE a few days ago.  Denies fever, chills, chest pain, cough, SOB, abdominal pain, n/v/d, dysuria, headaches. Has chronic discoloration to BLEs without recent worsening.     ED: Afebrile . Hr 90s. Satting well on home 2L NC. CBC without leukocytosis or anemia. CMP notable for small MARQUEZ with Cr 1.8 (baseline 1.3) and BUN 26. BNP 1165. Trop 0.032 (0.028 at baseline). Lactic 1.2. CXR with Cardiomegaly with pulmonary vascular congestion and small bilateral pleural effusions, suggestive of pulmonary edema secondary to CHF. Given rocephin, toprol 100 mg and IV lasix 60 mg. Placed in observation for CHF exacerbation.     * No surgery found *      Hospital Course:   Misa Barajas is a 78 y.o. F who was admitted to  for further evaluation of acute on chronic diastolic CHF. Diuresing on IV lasix. Echo with EF 55%, unable to assess diastolic function due to a-fib, CVP 15mmHg. Cr 1.8 on admission most likely 2/2 to fluid overloaded, improved with diuresis. UA infectious, culture with staph aureus, enterococcus faecalis. IV rocephin switched to IV  vancomycin. Transitioned to oral abx. Patient with urinary retention requiring straight cath. Initiated flomax and bladder scan q6hr. Continued retention - orosco cath placed but then passed voiding trial. Will set up urology follow-up OP. Cr bump of 1.9 after improvement with diuresis, originally thought to be due to possibly over-diuresis or abx. Unimproved with IVF. Mcgarry's stain with eosinophils, concerning for possible AIN - discontinued all abx. Nephrology consult placed for further recommendations. Holding all IVF and diuretics, measure I/Os, trend renal function. RP US with chronic medical renal disease, no hydronephrosis, 3mm non obstructing renal stone. Kidney function continues to improve, back to baseline on 5/21. Orthostatics positive, given IVF. Hold losartan and jardiance upon dc. Change lasix to prn to avoid hypotension. Follow ups with nephrology, cardiology, PCP, and urology. Plan and medication changes discussed with patient; agreeable to plan. ER precautions were given. All questions were answered to the patient's satisfaction and she was subsequently discharged home.     The mobility limitation cannot be sufficiently resolved by the use of a cane. Patient's functional mobility deficit can be sufficiently resolved with the use of a Rollator with seat attachment. Patient's mobility limitation significantly impairs their ability to participate in one of more activities of daily living. The use of a Rollator with seat attachment will significantly improve the patient's ability to participate in MRADLS and the patient will use it on regular basis in the home.        Goals of Care Treatment Preferences:  Code Status: Full Code      Consults:   Consults (From admission, onward)          Status Ordering Provider     Inpatient consult to Midline team  Once        Provider:  (Not yet assigned)    DOROTA Granados     Inpatient consult to Nephrology  Once        Provider:  (Not yet assigned)     Completed YESSENIA ROTHMAN     Inpatient consult to Social Work/Case Management  Once        Provider:  (Not yet assigned)    Completed TIMOTEO KNOX     Inpatient consult to Registered Dietitian/Nutritionist  Once        Provider:  (Not yet assigned)    Completed TIMOTEO KNOX            No new Assessment & Plan notes have been filed under this hospital service since the last note was generated.  Service: Hospital Medicine    Final Active Diagnoses:    Diagnosis Date Noted POA    PRINCIPAL PROBLEM:  Hypotension [I95.9] 05/20/2024 Yes    Orthostatic hypotension [I95.1] 05/21/2024 No    Class 1 obesity due to excess calories with serious comorbidity and body mass index (BMI) of 30.0 to 30.9 in adult [E66.09, Z68.30] 05/16/2024 Not Applicable    Acute interstitial nephritis [N10] 05/15/2024 No    Hypermagnesemia [E83.41] 05/12/2024 Yes    Urinary retention [R33.9] 05/10/2024 Yes    Hypophosphatemia [E83.39] 05/10/2024 No    Elevated troponin [R79.89] 05/08/2024 Yes    Wound of right lower extremity [S81.801A] 05/08/2024 Yes    Acute cystitis [N30.00] 05/08/2024 Yes    Acute on chronic diastolic CHF (congestive heart failure) [I50.33] 05/07/2024 Yes    Acute renal failure superimposed on stage 3b chronic kidney disease [N17.9, N18.32] 05/02/2022 Yes    Chronic respiratory failure with hypoxia [J96.11] 06/26/2021 Yes    Centrilobular emphysema [J43.2] 06/22/2021 Yes    Paroxysmal atrial fibrillation [I48.0] 06/07/2021 Yes    Hyperlipidemia [E78.5] 11/20/2016 Yes    Essential hypertension [I10] 05/28/2015 Yes      Problems Resolved During this Admission:       Discharged Condition: fair    Disposition: Home-Health Care c    Follow Up:   Follow-up Information       [facility] Follow up in 2 day(s).    Specialties: Home Health Services, Home Therapy Services, Home Living Aide Services  Why: Current with agency. Staff will contact patient with resumption of care visit.  Contact  "information:  [address] [facility]  [place] [address]  [phone]             Celia Carlisle MD Follow up.    Specialty: Internal Medicine  Why: The clinic nurse will contact pt with an appointment for her hospital f/u visit.  Contact information:  [address] [place] [id number] [phone]               [place] [facility] - Follow up.    Specialty: Home Health Services  Why: Your Home Health company will continue your Physical and Occupational Therapy treatments in your home and send nursing or aides as needed. If you have not been contacted to schedule your first session within two business days of discharge, please contact the company listed.  Contact information:  [address]  [address]  [place] [id number] [phone]                           Patient Instructions:      WALKER FOR HOME USE     Order Specific Question Answer Comments   Type of Walker: Adult (5'4"-6'6")    With wheels? Yes    Height: 5' 4" (1.626 m)    Weight: 82.6 kg (182 lb 1.6 oz)    Length of need (1-99 months): 99    Does patient have medical equipment at home? bedside commode    Does patient have medical equipment at home? oxygen    Does patient have medical equipment at home? walker, standard    Please check all that apply: Patient's condition impairs ambulation.    Please check all that apply: Patient needs help to get in and out of chair.    Please check all that apply: Patient is unable to safely ambulate without equipment.    Please check all that apply: Altered sensory perception.    Please check all that apply: Walker will be used for gait training.      WALKER FOR HOME USE     Order Specific Question Answer Comments   Type of Walker: Adult (5'4"-6'6")    With wheels? Yes    Height: 5' 4" (1.626 m)    Weight: 84.1 kg    Length of need (1-99 months): 99    Does patient have medical equipment at home? bedside commode    Does patient have medical equipment at home? walker, rolling  "   Does patient have medical equipment at home? oxygen    Please check all that apply: Patient's condition impairs ambulation.    Please check all that apply: Patient is unable to safely ambulate without equipment.      Ambulatory referral/consult to Urology   Standing Status: Future   Referral Priority: Urgent Referral Type: Consultation   Referral Reason: Specialty Services Required   Requested Specialty: Urology   Number of Visits Requested: 1     Ambulatory referral/consult to Cardiology   Standing Status: Future   Referral Priority: Urgent Referral Type: Consultation   Referral Reason: Specialty Services Required   Requested Specialty: Cardiology   Number of Visits Requested: 1     Ambulatory referral/consult to Cardiac Electrophysiology   Standing Status: Future   Referral Priority: Urgent Referral Type: Consultation   Referral Reason: Specialty Services Required   Requested Specialty: Cardiology   Number of Visits Requested: 1     Ambulatory referral/consult to Urology   Standing Status: Future   Referral Priority: Urgent Referral Type: Consultation   Referral Reason: Specialty Services Required   Requested Specialty: Urology   Number of Visits Requested: 1     Ambulatory referral/consult to Nephrology   Standing Status: Future   Referral Priority: Urgent Referral Type: Consultation   Referral Reason: Specialty Services Required   Requested Specialty: Nephrology   Number of Visits Requested: 1     Ambulatory referral/consult to [facility]   Standing Status: Future   Referral Priority: Routine Referral Type: Consultation   Referral Reason: Specialty Services Required   Requested Specialty: Cardiology   Number of Visits Requested: 1     Diet Cardiac     Notify your health care provider if you experience any of the following:  temperature >100.4     Notify your health care provider if you experience any of the following:  severe uncontrolled pain     Notify your health care provider if  you experience any of the following:  increased confusion or weakness     Call MD for:  temperature >100.4     Call MD for:  persistent nausea and vomiting or diarrhea     Call MD for:  severe uncontrolled pain     Call MD for:  redness, tenderness, or signs of infection (pain, swelling, redness, odor or green/yellow discharge around incision site)     Call MD for:  difficulty breathing or increased cough     Call MD for:  severe persistent headache     Call MD for:  worsening rash     Call MD for:  persistent dizziness, light-headedness, or visual disturbances     Call MD for:  increased confusion or weakness     Activity as tolerated     Activity as tolerated       Significant Diagnostic Studies: N/A    Pending Diagnostic Studies:       None           Medications:  Reconciled Home Medications:      Medication List        START taking these medications      tamsulosin 0.4 mg Cap  Commonly known as: FLOMAX  Take 1 capsule (0.4 mg total) by mouth once daily.            CHANGE how you take these medications      ELIQUIS 2.5 mg Tab  Generic drug: apixaban  Take 1 tablet (2.5 mg total) by mouth 2 (two) times a day.  What changed:   medication strength  how much to take     empagliflozin 10 mg tablet  Commonly known as: JARDIANCE  Take 1 tablet (10 mg total) by mouth once daily. HOLD this medication until instructed by your doctor to restart.  What changed: additional instructions     furosemide 20 MG tablet  Commonly known as: LASIX  Take 2 tablets (40 mg total) by mouth daily as needed.  What changed:   medication strength  when to take this  reasons to take this     losartan 50 MG tablet  Commonly known as: COZAAR  Take 1 tablet (50 mg total) by mouth once daily. HOLD this medication until instructed by your doctor to restart.  What changed: additional instructions            CONTINUE taking these medications      acetaminophen 500 MG tablet  Commonly known as: TYLENOL  Take 2 tablets (1,000 mg total) by mouth every 8  (eight) hours as needed for Pain.     aspirin 81 MG EC tablet  Commonly known as: ECOTRIN  Take 81 mg by mouth once daily.     biotin 1 mg tablet  Take 1,000 mcg by mouth once daily.     BREZTRI AEROSPHERE 160-9-4.8 mcg/actuation Hfaa  Generic drug: budesonide-glycopyr-formoterol  Inhale 2 puffs into the lungs 2 (two) times daily.     busPIRone 5 MG Tab  Commonly known as: BUSPAR  Take 5 mg by mouth as needed.     fluticasone propionate 50 mcg/actuation nasal spray  Commonly known as: FLONASE  1 spray by Each Nostril route daily as needed (congestion).     inhalation spacing device  Use as directed for inhalation.     metoprolol succinate 100 MG 24 hr tablet  Commonly known as: TOPROL-XL  Take 1 tablet (100 mg total) by mouth once daily.     rosuvastatin 5 MG tablet  Commonly known as: CRESTOR  Take 1 tablet (5 mg total) by mouth once daily.     vitamin D 1000 units Tab  Commonly known as: VITAMIN D3  Take 2 tablets (2,000 Units total) by mouth once daily.              Indwelling Lines/Drains at time of discharge:   Lines/Drains/Airways       None                   Time spent on the discharge of patient: 35 minutes         Nicole Nogueira PA-C  Department of Hospital Medicine  [facility] - Cardiology Stepdown

## 2024-05-22 NOTE — PLAN OF CARE
CHW met with patient/family at bedside. Patient experience rounding completed and reviewed the following.     Do you know your discharge plan? Yes or No,    If yes, what is the plan? (Home, Home Health, Rehab, SNF, LTAC, or Other)Yes Home w/ HH    Have you discussed your needs and preferences with your SW/CM? Yes or No  Yes Home w/ HH    If you are discharging home, do you have help at home? Yes or No Yes (Daughter)    Do you think you will need help additional at home at discharge? Yes or No   No    Do you currently have difficulty keeping up with bills, affording medicine or buying food? Yes or No No    Assigned SW/CM notified of any patient/family needs or concerns. Appropriate resources provided to address patient's needs.  Rocio Acevedo CHW  Case Management  [phone]

## 2024-05-22 NOTE — PLAN OF CARE
Patient's vitals stable, no complaints of pain, peripheral IV removed, dressed and rolled to her daughter's car to be taken home by her daughter. Reviewed discharge paperwork with patient and daughter.

## 2024-05-22 NOTE — PLAN OF CARE
[facility] - Cardiology Stepdown      HOME HEALTH ORDERS  FACE TO FACE ENCOUNTER    Patient Name: Misa Barajas  YOB: 1945    PCP: Celia Carlisle MD   [address]P [address] [place] [address]  PCP Phone Number: [phone]  PCP Fax: [phone]    Encounter Date: 5/7/24    Admit to Home Health    Diagnoses:  Active Hospital Problems    Diagnosis  POA    *Hypotension [I95.9]  Yes    Orthostatic hypotension [I95.1]  Unknown    Class 1 obesity due to excess calories with serious comorbidity and body mass index (BMI) of 30.0 to 30.9 in adult [E66.09, Z68.30]  Not Applicable    Acute interstitial nephritis [N10]  No    Hypermagnesemia [E83.41]  Yes    Urinary retention [R33.9]  Yes    Hypophosphatemia [E83.39]  No    Elevated troponin [R79.89]  Yes    Wound of right lower extremity [S81.801A]  Yes    Acute cystitis [N30.00]  Yes    Acute on chronic diastolic CHF (congestive heart failure) [I50.33]  Yes    Acute renal failure superimposed on stage 3b chronic kidney disease [N17.9, N18.32]  Yes    Chronic respiratory failure with hypoxia [J96.11]  Yes    Centrilobular emphysema [J43.2]  Yes    Paroxysmal atrial fibrillation [I48.0]  Yes    Hyperlipidemia [E78.5]  Yes    Essential hypertension [I10]  Yes      Resolved Hospital Problems   No resolved problems to display.       Follow Up Appointments:  Future Appointments   Date Time Provider Department Center   5/22/2024  2:30 PM Steph Parekh PA-C [facility] CARDIO [facility]   5/27/2024  2:40 PM Celia Carlisle MD [facility] IM [place]   5/28/2024  1:15 PM EKG, APPT [facility] EKG [facility]   5/28/2024  2:00 PM Taz Church MD [facility] ARRHYTH [facility]   5/30/2024  2:20 PM Ibeth Christy MD [facility] NEPHRO [facility]   6/3/2024  3:00 PM Ana Pinzon, NP [facility] URO [place]       Allergies:  Review of patient's allergies indicates:   Allergen Reactions    Adhesive Rash     Pt states she removed a LATEX bandaid and the skin beneath was swollen and  red. No other latex causes a reaction.       Medications: Review discharge medications with patient and family and provide education.    Current Facility-Administered Medications   Medication Dose Route Frequency Provider Last Rate Last Admin    acetaminophen tablet 1,000 mg  1,000 mg Oral Q8H PRN Adina Wolff PA-C   1,000 mg at 05/18/24 2324    acetaminophen tablet 650 mg  650 mg Oral Q4H PRN Emmy Loyola PA-C   650 mg at 05/10/24 0305    albuterol-ipratropium 2.5 mg-0.5 mg/3 mL nebulizer solution 3 mL  3 mL Nebulization Q4H PRN Emmy Loyola PA-C        apixaban tablet 2.5 mg  2.5 mg Oral BID Emmy Loyola PA-C   2.5 mg at 05/22/24 0952    aspirin EC tablet 81 mg  81 mg Oral Daily Emmy Loyola PA-C   81 mg at 05/22/24 0952    atorvastatin tablet 20 mg  20 mg Oral Daily Emmy Loyola PA-C   20 mg at 05/22/24 0952    bisacodyL suppository 10 mg  10 mg Rectal Daily PRN Emmy Loyola PA-ROOSE[place]ELT        dextrose 10% bolus 125 mL 125 mL  12.5 g Intravenous PRN Emmy Loyola PA-ROOSEVELT        dextrose 10% bolus 250 mL 250 mL  25 g Intravenous PRN Emmy Loyola PA-ROO[place]T        fluticasone furoate-vilanteroL 100-25 mcg/dose diskus inhaler 1 puff  1 puff Inhalation Daily Adina Wolff PA-C   1 puff at 05/21/24 0849    glucagon (human recombinant) injection 1 mg  1 mg Intramuscular PRN Emmy Loyola PA-C        glucose chewable tablet 16 g  16 g Oral PRN Emmy Loyola PA-C        glucose chewable tablet 24 g  24 g Oral PRN Emmy Loyola PA-C        melatonin tablet 6 mg  6 mg Oral Nightly PRN Emmy Loyola PA-C        methocarbamoL tablet 500 mg  500 mg Oral QID PRN Adina Wolff PA-C        metoprolol succinate (TOPROL-XL) 24 hr tablet 100 mg  100 mg Oral Daily Emmy Loyola PA-C   100 mg at 05/22/24 0952    miconazole NITRATE 2 % top powder   Topical (Top) BID Adina Wolff PA-C   Given at 05/22/24 0954    ondansetron  disintegrating tablet 8 mg  8 mg Oral Q8H PRN Emmy Loyola PA-C        polyethylene glycol packet 17 g  17 g Oral Daily Adina Wolff PA-C   17 g at 05/18/24 1000    promethazine tablet 25 mg  25 mg Oral Q6H PRN Emmy Loyola PA-C        sodium chloride 0.9% flush 10 mL  10 mL Intravenous PRN Emmy Loyola PA-C        tamsulosin 24 hr capsule 0.4 mg  0.4 mg Oral Daily Adina Wolff PA-C   0.4 mg at 05/22/24 0952     Current Discharge Medication List        START taking these medications    Details   tamsulosin (FLOMAX) 0.4 mg Cap Take 1 capsule (0.4 mg total) by mouth once daily.  Qty: 30 capsule, Refills: 11           CONTINUE these medications which have CHANGED    Details   apixaban (ELIQUIS) 2.5 mg Tab Take 1 tablet (2.5 mg total) by mouth 2 (two) times a day.  Qty: 60 tablet, Refills: 11      empagliflozin (JARDIANCE) 10 mg tablet Take 1 tablet (10 mg total) by mouth once daily. HOLD this medication until instructed by your doctor to restart.      furosemide (LASIX) 20 MG tablet Take 2 tablets (40 mg total) by mouth daily as needed.      losartan (COZAAR) 50 MG tablet Take 1 tablet (50 mg total) by mouth once daily. HOLD this medication until instructed by your doctor to restart.    Comments: .  Associated Diagnoses: Chronic diastolic heart failure           CONTINUE these medications which have NOT CHANGED    Details   acetaminophen (TYLENOL) 500 MG tablet Take 2 tablets (1,000 mg total) by mouth every 8 (eight) hours as needed for Pain.  Qty: 42 tablet, Refills: 0      aspirin (ECOTRIN) 81 MG EC tablet Take 81 mg by mouth once daily.      biotin 1 mg tablet Take 1,000 mcg by mouth once daily.      budesonide-glycopyr-formoterol (BREZTRI AEROSPHERE) 160-9-4.8 mcg/actuation HFAA Inhale 2 puffs into the lungs 2 (two) times daily.  Qty: 32.1 g, Refills: 3    Associated Diagnoses: Asthma-COPD overlap syndrome      busPIRone (BUSPAR) 5 MG Tab Take 5 mg by mouth as needed.       fluticasone propionate (FLONASE) 50 mcg/actuation nasal spray 1 spray by Each Nostril route daily as needed (congestion).      inhalation spacing device Use as directed for inhalation.  Qty: 1 Device, Refills: 0      metoprolol succinate (TOPROL-XL) 100 MG 24 hr tablet Take 1 tablet (100 mg total) by mouth once daily.  Qty: 90 tablet, Refills: 3    Comments: .  Associated Diagnoses: Essential hypertension      rosuvastatin (CRESTOR) 5 MG tablet Take 1 tablet (5 mg total) by mouth once daily.  Qty: 90 tablet, Refills: 3    Associated Diagnoses: Hyperlipidemia, unspecified hyperlipidemia type      vitamin D (VITAMIN D3) 1000 units Tab Take 2 tablets (2,000 Units total) by mouth once daily.  Qty: 180 tablet, Refills: 3    Associated Diagnoses: Vitamin D insufficiency               I have seen and examined this patient within the last 30 days. My clinical findings that support the need for the home health skilled services and home bound status are the following:no   Weakness/numbness causing balance and gait disturbance due to Heart Failure, COPD Exacerbation, and Weakness/Debility making it taxing to leave home.     Diet:   cardiac diet      Referrals/ Consults  Physical Therapy to evaluate and treat. Evaluate for home safety and equipment needs; Establish/upgrade home exercise program. Perform / instruct on therapeutic exercises, gait training, transfer training, and Range of Motion.  Occupational Therapy to evaluate and treat. Evaluate home environment for safety and equipment needs. Perform/Instruct on transfers, ADL training, ROM, and therapeutic exercises.  Aide to provide assistance with personal care, ADLs, and vital signs.    Activities:   activity as tolerated    Nursing:   Agency to admit patient within 24 hours of hospital discharge unless specified on physician order or at patient request    SN to complete comprehensive assessment including routine vital signs. Instruct on disease process and s/s of  complications to report to MD. Review/verify medication list sent home with the patient at time of discharge  and instruct patient/caregiver as needed. Frequency may be adjusted depending on start of care date.     Skilled nurse to perform up to 3 visits PRN for symptoms related to diagnosis    Notify MD if SBP > 160 or < 90; DBP > 90 or < 50; HR > 120 or < 50; Temp > 101; O2 < 88%    Ok to schedule additional visits based on staff availability and patient request on consecutive days within the home health episode.    When multiple disciplines ordered:    Start of Care occurs on Sunday - Wednesday schedule remaining discipline evaluations as ordered on separate consecutive days following the start of care.    Thursday SOC -schedule subsequent evaluations Friday and Monday the following week.     Friday - Saturday SOC - schedule subsequent discipline evaluations on consecutive days starting Monday of the following week.      Miscellaneous   Heart Failure:      SN to instruct on the following:    Instruct on the definition of CHF.   Instruct on the signs/sympoms of CHF to be reported.   Instruct on and monitor daily weights.   Instruct on factors that cause exacerbation.   Instruct on action, dose, schedule, and side effects of medications.   Instruct on diet as prescribed.   Instruct on activity allowed.   Instruct on life-style modifications for life long management of CHF   SN to assess compliance with daily weights, diet, medications, fluid retention,    safety precautions, activities permitted and life-style modifications.   Additional 1-2 SN visits per week as needed for signs and symptoms     of CHF exacerbation.      For Weight Gain > 2-3 lbs in 1 day or 4-6 lbs over 1 week notify PCP:  CHF DIURETIC SLIDING SCALE     Skilled nurse visits daily to instruct and monitor medication adherence until target weight. Then resume prior order frequency.     If weight gain exceeds 5 lbs over target weight, call MD  If  weight gain of 3-4 lbs over target weight, then:     Increase Furosemide - current dose (mg/day) to 40mg daily target weight achieved or maximum 3 days.     If already on max oral daily dose, see IV diuretic instructions.    After 3 days of increased oral diuretic dose, get BMP. If patient is on increased oral diuretic dose greater than 5 days, repeat BMP at day 7 of increased dose.   If target weight not reached after 5 days of increased oral diuretic, proceed to IV diuretic with daily patient contact to include face-to-face visit and telephone encounters.       Home Oxygen:  Oxygen at 2 L/min nasal canula to be used:  Continuously.    Home Health Aide:  Nursing Three times weekly, Physical Therapy Three times weekly, Occupational Therapy Three times weekly, Medical Social Work Weekly, Respiratory Therapy Twice weekly, and Home Health Aide Three times weekly    Wound Care Orders  no    I certify that this patient is confined to her home and needs intermittent skilled nursing care, physical therapy, and occupational therapy.

## 2024-05-22 NOTE — PT/OT/SLP PROGRESS
Chris Quintero PAC     Please see my chart message - patient asking for generic nuva ring (RN is not certain there is a generic)?     Would you like patient to contact insurance to see what is covered?     Thank you   Yessenia Coyle, Registered Nurse  [facility]      Physical Therapy Treatment    Patient Name:  Misa Barajas   MRN:  [id number]    Recommendations:     Discharge Recommendations: Low Intensity Therapy  Discharge Equipment Recommendations: rollator  Barriers to discharge: None    Assessment:     Misa Barajas is a 78 y.o. female admitted with a medical diagnosis of Hypotension.  She presents with the following impairments/functional limitations: weakness, impaired endurance, impaired self care skills, impaired functional mobility, gait instability, impaired balance, decreased safety awareness, decreased lower extremity function, decreased upper extremity function, impaired cardiopulmonary response to activity. Pt w/ improved mobility and independence when utilizing rollator today as it allowed her to take seated breaks as needed for SOB/fatigue. Pt is currently mobilizing well enough to safely return home w/ family upon d/c but will benefit from continued treatment here and upon d/c to address the above listed impairments in order to progress back to PLOF.     Rehab Prognosis: Good; patient would benefit from acute skilled PT services to address these deficits and reach maximum level of function.    Recent Surgery: * No surgery found *      Plan:     During this hospitalization, patient to be seen 3 x/week to address the identified rehab impairments via gait training, therapeutic activities, therapeutic exercises, neuromuscular re-education and progress toward the following goals:    Plan of Care Expires:  06/01/24    Subjective     Chief Complaint: SOB/fatigue  Patient/Family Comments/goals: pt reports she likes the rollator and feels it helps her more than the RW  Pain/Comfort:  Pain Rating 1: 0/10  Pain Rating Post-Intervention 1: 0/10      Objective:     Communicated with RN prior to session.  Patient found HOB elevated with oxygen, telemetry, PureWick upon PT entry to room.     General Precautions: Standard, fall  Orthopedic Precautions: N/A  Braces:  N/A  Respiratory Status: Nasal cannula, flow 2 L/min     Functional Mobility:  Bed Mobility:     Scooting: stand by assistance  Supine to Sit: stand by assistance  Sit to Supine: minimum assistance  Transfers:     Sit to Stand:  stand by assistance with 4 wheeled walker  Gait: 2x12' w/ SBA using rollator w/ good proximity to AD, proper safety awareness, and proper use of brakes when transitioning from sitting to/from standing w/ rollator  Balance: static standing balance SBA w/ rollator      AM-PAC 6 CLICK MOBILITY  Turning over in bed (including adjusting bedclothes, sheets and blankets)?: 3  Sitting down on and standing up from a chair with arms (e.g., wheelchair, bedside commode, etc.): 3  Moving from lying on back to sitting on the side of the bed?: 3  Moving to and from a bed to a chair (including a wheelchair)?: 3  Need to walk in hospital room?: 3  Climbing 3-5 steps with a railing?: 2  Basic Mobility Total Score: 17       Treatment & Education:  Pt educated on PT POC/goals, d/c recs, and continued treatment. All questions answered and pt in agreement w/ POC.  Standing balance x3min while PT provided maxA to perform pericare in standing 2/2 pt received soiled  Practiced locking brakes on rollator and safely turning to sit down on rollator to take seated break  Educated on safety awareness w/ rollator including use of brakes, use during ambulation, and importance of NOT unlocking brakes while seated on rollator or attempting to move around the house while seated on rollator; pt and dtr verbalized understanding   Gait training w/ verbal and visual cueing for step length, step width, step height, postural control, and width of VITOR     Patient left HOB elevated with all lines intact, call button in reach, RN notified, and dtr present..    GOALS:   Multidisciplinary Problems       Physical Therapy Goals          Problem: Physical Therapy    Goal Priority Disciplines Outcome Goal Variances Interventions   Physical  Therapy Goal     PT, PT/OT Progressing     Description: Goals to be met by: 2024     Patient will increase functional independence with mobility by performin. Sit to stand transfer with Stand-by Assistance. -In progress  2. Bed to chair transfer with Stand-by Assistance using Rolling Walker. -In progress  3. Gait x 25 feet with Stand-by Assistance using Rolling Walker. -In progress   4. Lower extremity exercise program x20 reps per handout, with supervision. -In progress                       Time Tracking:     PT Received On: 24  PT Start Time:      PT Stop Time: 1458  PT Total Time (min): 44 min     Billable Minutes: Gait Training 29 and Therapeutic Activity 15    Treatment Type: Treatment  PT/PTA: PT     Number of PTA visits since last PT visit: 0     2024

## 2024-05-22 NOTE — PLAN OF CARE
Pt discharged home with [facility] and no other post-acute needs. Transported by family members. Follow up appointments scheduled by SAMY Chino.    Rocio Kurtz, [facility]  Case Management Department  [email or web address]    [facility] - Cardiology Stepdown  Discharge Final Note    Primary Care Provider: Celia Carlisle MD    Expected Discharge Date: 5/22/2024    Final Discharge Note (most recent)       Final Note - 05/22/24 1313          Final Note    Assessment Type Final Discharge Note     Anticipated Discharge Disposition [place] [facility]     What phone number can be called within the next 1-3 days to see how you are doing after discharge? [phone]     Hospital Resources/Appts/Education Provided Provided patient/caregiver with written discharge plan information;Appointments scheduled and added to AVS;Post-Acute resouces added to AVS        Post-Acute Status    Post-Acute Authorization Home Health     Home Health Status Set-up Complete/Auth obtained     Patient choice form signed by patient/caregiver List with quality metrics by geographic area provided     Discharge Delays None known at this time                     Important Message from Medicare  Important Message from Medicare regarding Discharge Appeal Rights: Given to patient/caregiver, Explained to patient/caregiver, Signed/date by patient/caregiver     Date IMM was signed: 05/17/24  Time IMM was signed: 1329    Contact Info       [facility]   Specialty: Home Health Services, Home Therapy Services, Home Living Aide Services    [address] [place] [address]   Phone: [phone]       Next Steps: Follow up in 2 day(s)    Instructions: Current with agency. Staff will contact patient with resumption of care visit.    Celia Carlisle MD   Specialty: Internal Medicine   Relationship: PCP - General  Consulting Physician    [address]  [place] [address]   Phone: [phone]       Next  Steps: Follow up    Instructions: The clinic nurse will contact pt with an appointment for her hospital f/u visit.    [place], [facility] -   Specialty: Home Health Services    [address]  [address]  [place] [address]   Phone: [phone]       Next Steps: Follow up    Instructions: Your Home Health company will continue your Physical and Occupational Therapy treatments in your home and send nursing or aides as needed. If you have not been contacted to schedule your first session within two business days of discharge, please contact the company listed.          Future Appointments   Date Time Provider Department Center   5/22/2024  2:30 PM Steph Parekh PA-C [facility] CARDIO [facility]   5/27/2024  2:40 PM Celia Carlisle MD [facility]   5/28/2024  1:15 PM EKG, APPT [facility] EKG [facility]   5/28/2024  2:00 PM Taz Church MD [facility] ARRHYTH [facility]   5/30/2024  2:20 PM Ibeth Christy MD [facility] NEPHRO [facility]   6/3/2024  3:00 PM Ana Pinzon NP [facility] URO [place]

## 2024-05-23 ENCOUNTER — PATIENT OUTREACH (OUTPATIENT)
Dept: ADMINISTRATIVE | Facility: CLINIC | Age: 79
End: 2024-05-23
Payer: MEDICARE

## 2024-05-23 ENCOUNTER — DOCUMENTATION ONLY (OUTPATIENT)
Dept: CARDIOLOGY | Facility: CLINIC | Age: 79
End: 2024-05-23
Payer: MEDICARE

## 2024-05-23 ENCOUNTER — TELEPHONE (OUTPATIENT)
Dept: ADMINISTRATIVE | Facility: CLINIC | Age: 79
End: 2024-05-23
Payer: MEDICARE

## 2024-05-23 ENCOUNTER — EXTERNAL HOME HEALTH (OUTPATIENT)
Dept: HOME HEALTH SERVICES | Facility: HOSPITAL | Age: 79
End: 2024-05-23
Payer: MEDICARE

## 2024-05-23 NOTE — PROGRESS NOTES
C3 nurse spoke with Misa Barajas for a TCC post hospital discharge follow up call. The patient has a scheduled [facility] appointment with Celia Carlisle MD on 05/27/24 @ 6470.

## 2024-05-23 NOTE — PROGRESS NOTES
Heart Failure Transitional Care Clinic (HFTCC) Team notified of pt referral via Ambulatory Referral to Heart Failure Transitional Care ([id number]).    Patient screened today 05/23/2024 by provider and RN for enrollment to program.      Pt was deemed not a candidate for enrollment at this time related to patient refused.    Pt will require additional follow up planning per primary team.     If pt status, diagnosis, or treatment plan changes , please place AMB referral to Heart Failure Transitional Care Clinic ([id number]) for HFTCC enrollment re-evalution.

## 2024-05-24 ENCOUNTER — TELEPHONE (OUTPATIENT)
Dept: INTERNAL MEDICINE | Facility: CLINIC | Age: 79
End: 2024-05-24
Payer: MEDICARE

## 2024-05-24 ENCOUNTER — TELEPHONE (OUTPATIENT)
Dept: ADMINISTRATIVE | Facility: CLINIC | Age: 79
End: 2024-05-24
Payer: MEDICARE

## 2024-05-24 ENCOUNTER — PATIENT MESSAGE (OUTPATIENT)
Dept: INTERNAL MEDICINE | Facility: CLINIC | Age: 79
End: 2024-05-24
Payer: MEDICARE

## 2024-05-24 NOTE — TELEPHONE ENCOUNTER
Called yoshi and explained what was happening and confirmed appt. We talked to insurance and moms meals and have tried to put this together

## 2024-05-24 NOTE — TELEPHONE ENCOUNTER
----- Message from Lucero Huggins sent at 5/24/2024  9:34 AM CDT -----  Type:  Needs Medical Advice    Who Called: pt daughter Marina  Would the patient rather a call back or a response via [facility]? Call back  Best Call Back Number: [phone]  Additional Information: Please return call about meal program and the rescheduling needed for Mondays appt.   Chart audit completed

## 2024-05-24 NOTE — TELEPHONE ENCOUNTER
----- Message from Claire Linares sent at 5/24/2024 12:32 PM CDT -----  [phone]  Contact: [facility] [phone]  Who Called: [facility] [phone]    Calling to talk to nurse in regards to getting orders for patients meals. Please advice

## 2024-05-27 ENCOUNTER — OFFICE VISIT (OUTPATIENT)
Dept: INTERNAL MEDICINE | Facility: CLINIC | Age: 79
End: 2024-05-27
Payer: MEDICARE

## 2024-05-27 VITALS — HEIGHT: 64 IN | HEART RATE: 65 BPM | WEIGHT: 171.63 LBS | OXYGEN SATURATION: 96 % | BODY MASS INDEX: 29.3 KG/M2

## 2024-05-27 DIAGNOSIS — T14.8XXA ABRASION: ICD-10-CM

## 2024-05-27 DIAGNOSIS — J96.11 CHRONIC RESPIRATORY FAILURE WITH HYPOXIA: ICD-10-CM

## 2024-05-27 DIAGNOSIS — E78.5 HYPERLIPIDEMIA, UNSPECIFIED HYPERLIPIDEMIA TYPE: ICD-10-CM

## 2024-05-27 DIAGNOSIS — I10 ESSENTIAL HYPERTENSION: ICD-10-CM

## 2024-05-27 DIAGNOSIS — J44.89 ASTHMA-COPD OVERLAP SYNDROME: ICD-10-CM

## 2024-05-27 DIAGNOSIS — R33.9 URINARY RETENTION: ICD-10-CM

## 2024-05-27 DIAGNOSIS — N18.32 CHRONIC KIDNEY DISEASE, STAGE 3B: ICD-10-CM

## 2024-05-27 DIAGNOSIS — Z79.01 CHRONIC ANTICOAGULATION: ICD-10-CM

## 2024-05-27 DIAGNOSIS — I48.0 PAROXYSMAL ATRIAL FIBRILLATION: ICD-10-CM

## 2024-05-27 DIAGNOSIS — I50.32 CHRONIC DIASTOLIC CHF (CONGESTIVE HEART FAILURE): ICD-10-CM

## 2024-05-27 PROCEDURE — 1101F PT FALLS ASSESS-DOCD LE1/YR: CPT | Mod: CPTII,S$GLB,, | Performed by: INTERNAL MEDICINE

## 2024-05-27 PROCEDURE — 1111F DSCHRG MED/CURRENT MED MERGE: CPT | Mod: CPTII,S$GLB,, | Performed by: INTERNAL MEDICINE

## 2024-05-27 PROCEDURE — 99215 OFFICE O/P EST HI 40 MIN: CPT | Mod: S$GLB,,, | Performed by: INTERNAL MEDICINE

## 2024-05-27 PROCEDURE — 1125F AMNT PAIN NOTED PAIN PRSNT: CPT | Mod: CPTII,S$GLB,, | Performed by: INTERNAL MEDICINE

## 2024-05-27 PROCEDURE — 3288F FALL RISK ASSESSMENT DOCD: CPT | Mod: CPTII,S$GLB,, | Performed by: INTERNAL MEDICINE

## 2024-05-27 PROCEDURE — 99999 PR PBB SHADOW E&M-EST. PATIENT-LVL V: CPT | Mod: PBBFAC,,, | Performed by: INTERNAL MEDICINE

## 2024-05-27 PROCEDURE — 1159F MED LIST DOCD IN RCRD: CPT | Mod: CPTII,S$GLB,, | Performed by: INTERNAL MEDICINE

## 2024-05-27 PROCEDURE — 1160F RVW MEDS BY RX/DR IN RCRD: CPT | Mod: CPTII,S$GLB,, | Performed by: INTERNAL MEDICINE

## 2024-05-27 RX ORDER — ALBUTEROL SULFATE 90 UG/1
2 AEROSOL, METERED RESPIRATORY (INHALATION) EVERY 6 HOURS PRN
Qty: 18 G | Refills: 3 | Status: SHIPPED | OUTPATIENT
Start: 2024-05-27

## 2024-05-27 NOTE — PROGRESS NOTES
Patient ID: Misa Barajas is a 78 y.o. female.    Chief Complaint: Follow-up    HPI Misa is a 78 y.o. female with   HTN, HLD, aortic atherosclerosis, atrial fibrillation, chronic kidney disease stage 3, chronic diastolic CHF, osteoporosis and asthma/ COPD who presents for routine follow up of medical conditions. Since last visit with me in August 2023, she has had 2 hospitalizations for acute on chronic CHF. Most recent hospitalization was from 5/7/24-5/21/24. She presents with her two daughters today. Of note, she fell out of her reclining chair last night and tore the skin on her right upper arm. No further acute complaints. She presents with oxygen via NC which she says was prescribed by pulmonologist Dr. Sánchez Hernandez who is now retired. She follows with cardiology for CHF and EP for Afib. She will soon see nephrology for CKD stage 3 and urology for acute urinary retention (which occurred in hospital). Patient states she has been urinating well. However, daughters pointed out that she did not know when she had urinary retention in the hospital. IT was discovered through monitoring her I &Os. They report normal BP at home with home health nurse checking it. Reviewed lab results from 5/21/24.    Review of Systems   Skin:         See HPI   All other systems reviewed and are negative.      Objective:     Vitals:    05/27/24 1510   Pulse: 65        Physical Exam  Vitals reviewed.   Constitutional:       General: She is not in acute distress.     Appearance: Normal appearance. She is well-developed. She is ill-appearing (chronically ill appearing). She is not toxic-appearing or diaphoretic.      Comments: In wheelchair with oxygen via NC   HENT:      Head: Normocephalic and atraumatic.      Right Ear: External ear normal.      Left Ear: External ear normal.      Nose: Nose normal.   Eyes:      General: No scleral icterus.        Right eye: No discharge.         Left eye: No discharge.      Extraocular Movements:  Extraocular movements intact.      Conjunctiva/sclera: Conjunctivae normal.   Cardiovascular:      Rate and Rhythm: Normal rate. Rhythm irregular.      Heart sounds: Normal heart sounds. No murmur heard.     No friction rub. No gallop.   Pulmonary:      Effort: Pulmonary effort is normal. No respiratory distress.      Breath sounds: Normal breath sounds. No stridor. No wheezing, rhonchi or rales.   Musculoskeletal:      Right lower leg: Edema (2+) present.      Left lower leg: Edema (2+) present.   Skin:     General: Skin is warm and dry.      Comments: Skin has torn on RUE and is bleeding. Patient had area wrapped with gauze. Attempted to remove gauze with normal saline but unable to do so without removing more skin. Arm was re-wrapped.    Neurological:      General: No focal deficit present.      Mental Status: She is alert and oriented to person, place, and time. Mental status is at baseline.   Psychiatric:         Mood and Affect: Mood normal.         Behavior: Behavior normal.         Thought Content: Thought content normal.         Judgment: Judgment normal.         Assessment:       1. Chronic diastolic CHF (congestive heart failure) Chronic   2. Essential hypertension Chronic   3. Hyperlipidemia, unspecified hyperlipidemia type Chronic   4. Paroxysmal atrial fibrillation Well controlled   5. Chronic anticoagulation Chronic   6. Chronic kidney disease, stage 3b Chronic   7. Asthma-COPD overlap syndrome Chronic   8. Abrasion Active   9. Chronic respiratory failure with hypoxia Chronic   10. Urinary retention        Plan:     Discussed considering Care at Home-Medical Care or Palliative Care once HH ends.     Chronic diastolic CHF (congestive heart failure)  Comments:  continue current medication. Continue to follow with cardiology  Orders:  -     Ambulatory referral/consult to Nutrition Services; Future; Expected date: 06/03/2024  -     COMPRESSION STOCKINGS    Essential hypertension  Comments:  Unable to  obtain BP due to injury to right arm and hx of breast cancer on left side. Continue current medications    Hyperlipidemia, unspecified hyperlipidemia type  Comments:  continue current medication    Paroxysmal atrial fibrillation  Comments:  continue current medications. Continue to follow with EP    Chronic anticoagulation  Comments:  Monitor CBCs    Chronic kidney disease, stage 3b  Comments:  continue to monitor. Continue to follow with nephrology  Orders:  -     Ambulatory referral/consult to Nutrition Services; Future; Expected date: 06/03/2024  -     Comprehensive Metabolic Panel; Future; Expected date: 08/27/2024  -     CBC Auto Differential; Future; Expected date: 08/27/2024    Asthma-COPD overlap syndrome  -     albuterol (PROVENTIL HFA) 90 mcg/actuation inhaler; Inhale 2 puffs into the lungs every 6 (six) hours as needed for Wheezing. Rescue  Dispense: 18 g; Refill: 3  -     Ambulatory referral/consult to Pulmonology; Future; Expected date: 06/03/2024    Abrasion  Comments:  Asking for home health to assist with wound care  Orders:  -     SUBSEQUENT HOME HEALTH ORDERS    Chronic respiratory failure with hypoxia  Comments:  continue oxygen. Continue to follow with Pulm    Urinary retention  Comments:  Current status unknown. (See HPI). Follow up with urology        RTC 3 months     I spent a total of 46 minutes on the day of the visit.This includes face to face time and non-face to face time preparing to see the patient (eg, review of tests), obtaining and/or reviewing separately obtained history, documenting clinical information in the electronic or other health record, independently interpreting results and communicating results to the patient/family/caregiver, or care coordinator.     Warning signs discussed, patient to call with any further issues or worsening of symptoms.       Parts of the above note were dictated using a voice dictation software. Please excuse any grammatical or typographical  errors.

## 2024-05-27 NOTE — PROGRESS NOTES
Subjective   Patient ID:  Misa Barajas is a 78 y.o. female who presents for evaluation of Atrial Flutter      78 yoF here for arrhythmia management. She developed AFL 3/24 and was in it again 5/2/24. Normal EF. On OAC. EVent monitor with AFL. She has extensive bruising on OAC.     Echo 5/24:  ·  Left Ventricle: The left ventricle is normal in size. Ventricular mass is normal. Normal wall thickness. Normal wall motion. There is normal systolic function. Ejection fraction by visual approximation is 55%. Unable to assess diastolic function due to atrial flutter.  ·  Right Ventricle: Normal right ventricular cavity size. Wall thickness is normal. Systolic function is mildly reduced.  ·  Aortic Valve: The aortic valve is a trileaflet valve. There is moderate aortic valve sclerosis. There is annular calcification present. Mildly restricted motion. No stenosis.  ·  Tricuspid Valve: There is mild to moderate regurgitation.  ·  Pulmonary Artery: The estimated pulmonary artery systolic pressure is 44 mmHg.  ·  IVC/SVC: Elevated venous pressure at 15 mmHg.    Past Medical History:  6/26/2021: Acute hypoxemic respiratory failure  1/25/2022: Acute superficial venous thrombosis of lower extremity,   bilateral  No date: Aortic atherosclerosis  No date: Arthritis      Comment:  knee joint pain  8/22/2022: Asthma-COPD overlap syndrome  2002: Breast cancer      Comment:  left breast & lymph nodes-s/p sx with chemo  No date: Bronchitis      Comment:  with flu-2/2014  No date: Cataracts, bilateral  5/4/2022: Chronic anticoagulation  12/24/2019: Chronic diastolic heart failure  No date: Chronic kidney disease, stage 3  5/16/2024: Class 1 obesity due to excess calories with serious   comorbidity and body mass index (BMI) of 30.0 to 30.9 in adult  No date: History of chemotherapy      Comment:  last treatment 12/2002 (had 8 treatments)  11/20/2016: Hyperlipidemia  No date: Hypertension  1/25/2022: Lymphedema of both lower  extremities  6/2/2014: Nephrolithiasis  No date: Osteoporosis  6/7/2021: Paroxysmal atrial fibrillation  No date: Renal calculi      Comment:  hematuria  1/14/2016: Renal osteodystrophy  1/25/2022: Venous stasis dermatitis of both lower extremities  No date: Vitamin D insufficiency    Past Surgical History:  2002: BREAST BIOPSY; Left      Comment:  core bx, +  2018: BREAST BIOPSY; Right      Comment:  core  2019: BREAST BIOPSY; Right  2002: BREAST LUMPECTOMY; Left  4/28/2022: CYSTOSCOPY      Comment:  Procedure: CYSTOSCOPY;  Surgeon: Vik Ware MD;  Location: [facility] OR 1ST FLR;  Service: Urology;;  5/30/2022: CYSTOSCOPY      Comment:  Procedure: CYSTOSCOPY;  Surgeon: Bonnie Knutson MD;  [facility];  Service: Urology;;  5/30/2022: LASER LITHOTRIPSY      Comment:  Procedure: LITHOTRIPSY, USING LASER;  Surgeon: Bonnie Knutson MD;  Location: [facility] OR 1ST FLR;  Service:                Urology;;  No date: LITHOTRIPSY  06/2002: MASTECTOMY; Left      Comment:  left-& lymph node dissection  5/30/2022: REPLACEMENT OF STENT; Right      Comment:  Procedure: REPLACEMENT, STENT;  Surgeon: Bonnie Knutson MD;  [facility];  Service:                Urology;  Laterality: Right;  4/28/2022: RETROGRADE PYELOGRAPHY; Right      Comment:  Procedure: PYELOGRAM, RETROGRADE;  Surgeon: Vik Ware MD;  Location: [facility] OR 1ST FLR;  Service:                Urology;  Laterality: Right;  3/11/2021: ROBOT-ASSISTED LAPAROSCOPIC PARTIAL NEPHRECTOMY USING DA   JESÚS XI; Right      Comment:  Procedure: XI ROBOTIC NEPHRECTOMY, PARTIAL;  Surgeon:                Taz Ortega MD;  Location: [facility] OR 2ND FLR;                 Service: Urology;  Laterality: Right;  4hr/ gen with                [facility]ional[facility] confirmation:  [id number] for 11:15am                case NC  5/30/2022: URETEROSCOPIC REMOVAL OF URETERIC  CALCULUS; Right      Comment:  Procedure: REMOVAL, CALCULUS, URETER, URETEROSCOPIC;                 Surgeon: Bonnie Knutson MD;  Location: [facility] OR                [address] [place] [address];  Service: Urology;  Laterality: Right;  No date: ureteroscopy; Bilateral      Comment:  6.14  5/30/2022: URETEROSCOPY; Right      Comment:  Procedure: URETEROSCOPY;  Surgeon: Bonnie Knutson MD;  Location: [facility] OR [address] [place] [address];  Service:                Urology;  Laterality: Right;    Social History    Socioeconomic History      Marital status:     Tobacco Use      Smoking status: Former        Packs/day: 0.00        Years: 1 pack/day for 50.0 years (50.0 ttl pk-yrs)        Types: Cigarettes        Start date: 1960        Quit date: 5/20/2009        Years since quitting: 15.0      Smokeless tobacco: Never    Substance and Sexual Activity      Alcohol use: No      Drug use: No      Sexual activity: Not Currently        Partners: Male        Birth control/protection: Partner-Vasectomy    Social Determinants of Health  Financial Resource Strain: Low Risk  (3/3/2024)      Overall Financial Resource Strain (CARDIA)          Difficulty of Paying Living Expenses: Not very hard  Food Insecurity: No Food Insecurity (3/3/2024)      Hunger Vital Sign          Worried About Running Out of Food in the Last Year: Never true          Ran Out of Food in the Last Year: Never true  Transportation Needs: No Transportation Needs (3/3/2024)      PRAPARE - Transportation          Lack of Transportation (Medical): No          Lack of Transportation (Non-Medical): No  Physical Activity: Inactive (3/3/2024)      Exercise Vital Sign          Days of Exercise per Week: 0 days          Minutes of Exercise per Session: 0 min  Stress: No Stress Concern Present (3/3/2024)      [employer] - Occupational Stress Questionnaire          Feeling of Stress : Not at all  Housing Stability: Low Risk  (3/3/2024)      Housing  Stability Vital Sign          Unable to Pay for Housing in the Last Year: No          Number of Places Lived in the Last Year: 1          Unstable Housing in the Last Year: No    Review of patient's family history indicates:  Problem: Bone cancer      Relation: Mother          Name: Bibi              Age of Onset: (Not Specified)  Problem: Breast cancer      Relation: Mother          Name: Bibi              Age of Onset: (Not Specified)  Problem: Arthritis      Relation: Mother          Name: Bibi              Age of Onset: (Not Specified)              Comment: Breast Cancer  Problem: Breast cancer      Relation: Sister          Name:               Age of Onset: (Not Specified)  Problem: Cancer      Relation: Father          Name: Maxwell              Age of Onset: (Not Specified)              Comment: Asbestos cancer  Problem: No Known Problems      Relation: Brother          Name:               Age of Onset: (Not Specified)  Problem: Fibroids      Relation: Daughter          Name:               Age of Onset: (Not Specified)  Problem: Crohn's disease      Relation: Daughter          Name:               Age of Onset: (Not Specified)  Problem: No Known Problems      Relation: Daughter          Name:               Age of Onset: (Not Specified)  Problem: Arthritis      Relation: Sister          Name:               Age of Onset: (Not Specified)              Comment: Breast Cancer  Problem: Anesthesia problems      Relation: Neg Hx          Name:               Age of Onset: (Not Specified)  Problem: Glaucoma      Relation: Neg Hx          Name:               Age of Onset: (Not Specified)    Current Outpatient Medications:  acetaminophen (TYLENOL) 500 MG tablet, Take 2 tablets (1,000 mg total) by mouth every 8 (eight) hours as needed for Pain., Disp: 42 tablet, Rfl: 0  apixaban (ELIQUIS) 2.5 mg Tab, Take 1 tablet (2.5 mg total) by mouth 2 (two) times a day., Disp: 60 tablet, Rfl: 11  aspirin (ECOTRIN) 81 MG EC  tablet, Take 81 mg by mouth once daily., Disp: , Rfl:   biotin 1 mg tablet, Take 1,000 mcg by mouth once daily., Disp: , Rfl:   budesonide-glycopyr-formoterol (BREZTRI AEROSPHERE) 160-9-4.8 mcg/actuation HFAA, Inhale 2 puffs into the lungs 2 (two) times daily., Disp: 32.1 g, Rfl: 3  busPIRone (BUSPAR) 5 MG Tab, Take 5 mg by mouth as needed., Disp: , Rfl:   empagliflozin (JARDIANCE) 10 mg tablet, Take 1 tablet (10 mg total) by mouth once daily. HOLD this medication until instructed by your doctor to restart. (Patient not taking: Reported on 5/23/2024), Disp: , Rfl:   fluticasone propionate (FLONASE) 50 mcg/actuation nasal spray, 1 spray by Each Nostril route daily as needed (congestion)., Disp: , Rfl:   furosemide (LASIX) 20 MG tablet, Take 2 tablets (40 mg total) by mouth daily as needed., Disp: , Rfl:   inhalation spacing device, Use as directed for inhalation., Disp: 1 Device, Rfl: 0  losartan (COZAAR) 50 MG tablet, Take 1 tablet (50 mg total) by mouth once daily. HOLD this medication until instructed by your doctor to restart. (Patient not taking: Reported on 5/23/2024), Disp: , Rfl:   metoprolol succinate (TOPROL-XL) 100 MG 24 hr tablet, Take 1 tablet (100 mg total) by mouth once daily., Disp: 90 tablet, Rfl: 3  rosuvastatin (CRESTOR) 5 MG tablet, Take 1 tablet (5 mg total) by mouth once daily., Disp: 90 tablet, Rfl: 3  tamsulosin (FLOMAX) 0.4 mg Cap, Take 1 capsule (0.4 mg total) by mouth once daily., Disp: 30 capsule, Rfl: 11  vitamin D (VITAMIN D3) 1000 units Tab, Take 2 tablets (2,000 Units total) by mouth once daily., Disp: 180 tablet, Rfl: 3    No current facility-administered medications for this visit.          Review of Systems   Constitutional: Negative.   HENT: Negative.     Eyes: Negative.    Cardiovascular:  Negative for chest pain, dyspnea on exertion, leg swelling, near-syncope, palpitations and syncope.   Respiratory:  Positive for shortness of breath.    Endocrine: Negative.     Hematologic/Lymphatic: Negative.    Skin: Negative.    Musculoskeletal: Negative.    Gastrointestinal: Negative.    Genitourinary: Negative.    Neurological: Negative.  Negative for dizziness and light-headedness.   Psychiatric/Behavioral: Negative.     Allergic/Immunologic: Negative.           Objective     Physical Exam  Vitals reviewed.   Constitutional:       General: She is not in acute distress.     Appearance: She is well-developed.   HENT:      Head: Normocephalic and atraumatic.   Eyes:      Pupils: Pupils are equal, round, and reactive to light.   Neck:      Thyroid: No thyromegaly.      Vascular: No JVD.   Cardiovascular:      Rate and Rhythm: Normal rate. Rhythm irregular.      Chest Wall: PMI is not displaced.      Heart sounds: Normal heart sounds, S1 normal and S2 normal. No murmur heard.     No friction rub. No gallop.   Pulmonary:      Effort: Pulmonary effort is normal. No respiratory distress.      Breath sounds: Normal breath sounds. No wheezing or rales.   Abdominal:      General: Bowel sounds are normal. There is no distension.      Palpations: Abdomen is soft.      Tenderness: There is no abdominal tenderness. There is no guarding or rebound.   Musculoskeletal:         General: No tenderness. Normal range of motion.      Cervical back: Normal range of motion.   Skin:     General: Skin is warm and dry.      Findings: No erythema or rash.   Neurological:      Mental Status: She is alert and oriented to person, place, and time.      Cranial Nerves: No cranial nerve deficit.   Psychiatric:         Behavior: Behavior normal.         Thought Content: Thought content normal.         Judgment: Judgment normal.          ECG: AFL with variable V rate    Assessment and Plan     1. Typical atrial flutter    2. Paroxysmal atrial fibrillation    3. Acute on chronic congestive heart failure        Plan:  78 yoF here for AFL management. She has symptomatic AFL with bruising on OAC. I offered ablation for  definitive management. Extensive discussion regarding risks, benefits, and alternative approaches to the procedure was had with the patient.  The patient voices understanding and all questions have been answered.  The patient would like to proceed as planned.      AFL ablation  LORENE  Last OAC night prior    Of note, AF has been reported on various studies. AFL is her predominant arrhythmia. I cannot exclude AF, but I can confirm AFL.

## 2024-05-28 ENCOUNTER — HOSPITAL ENCOUNTER (OUTPATIENT)
Dept: CARDIOLOGY | Facility: CLINIC | Age: 79
Discharge: HOME | End: 2024-05-28
Payer: MEDICARE

## 2024-05-28 ENCOUNTER — OFFICE VISIT (OUTPATIENT)
Dept: ELECTROPHYSIOLOGY | Facility: CLINIC | Age: 79
End: 2024-05-28
Payer: MEDICARE

## 2024-05-28 VITALS
BODY MASS INDEX: 29.28 KG/M2 | DIASTOLIC BLOOD PRESSURE: 70 MMHG | WEIGHT: 171.5 LBS | SYSTOLIC BLOOD PRESSURE: 114 MMHG | HEART RATE: 82 BPM | HEIGHT: 64 IN

## 2024-05-28 DIAGNOSIS — I49.9 CARDIAC ARRHYTHMIA, UNSPECIFIED CARDIAC ARRHYTHMIA TYPE: ICD-10-CM

## 2024-05-28 DIAGNOSIS — I48.3 TYPICAL ATRIAL FLUTTER: Primary | ICD-10-CM

## 2024-05-28 DIAGNOSIS — I50.9 ACUTE ON CHRONIC CONGESTIVE HEART FAILURE: ICD-10-CM

## 2024-05-28 DIAGNOSIS — I48.0 PAROXYSMAL ATRIAL FIBRILLATION: ICD-10-CM

## 2024-05-28 LAB
OHS QRS DURATION: 132 MS
OHS QTC CALCULATION: 474 MS

## 2024-05-28 PROCEDURE — 93010 ELECTROCARDIOGRAM REPORT: CPT | Mod: S$GLB,,, | Performed by: INTERNAL MEDICINE

## 2024-05-28 PROCEDURE — 1101F PT FALLS ASSESS-DOCD LE1/YR: CPT | Mod: CPTII,S$GLB,, | Performed by: INTERNAL MEDICINE

## 2024-05-28 PROCEDURE — 3078F DIAST BP <80 MM HG: CPT | Mod: CPTII,S$GLB,, | Performed by: INTERNAL MEDICINE

## 2024-05-28 PROCEDURE — 1160F RVW MEDS BY RX/DR IN RCRD: CPT | Mod: CPTII,S$GLB,, | Performed by: INTERNAL MEDICINE

## 2024-05-28 PROCEDURE — 93005 ELECTROCARDIOGRAM TRACING: CPT | Mod: S$GLB,,, | Performed by: INTERNAL MEDICINE

## 2024-05-28 PROCEDURE — 1111F DSCHRG MED/CURRENT MED MERGE: CPT | Mod: CPTII,S$GLB,, | Performed by: INTERNAL MEDICINE

## 2024-05-28 PROCEDURE — 99999 PR PBB SHADOW E&M-EST. PATIENT-LVL IV: CPT | Mod: PBBFAC,,, | Performed by: INTERNAL MEDICINE

## 2024-05-28 PROCEDURE — 3074F SYST BP LT 130 MM HG: CPT | Mod: CPTII,S$GLB,, | Performed by: INTERNAL MEDICINE

## 2024-05-28 PROCEDURE — 99205 OFFICE O/P NEW HI 60 MIN: CPT | Mod: S$GLB,,, | Performed by: INTERNAL MEDICINE

## 2024-05-28 PROCEDURE — 3288F FALL RISK ASSESSMENT DOCD: CPT | Mod: CPTII,S$GLB,, | Performed by: INTERNAL MEDICINE

## 2024-05-28 PROCEDURE — 1126F AMNT PAIN NOTED NONE PRSNT: CPT | Mod: CPTII,S$GLB,, | Performed by: INTERNAL MEDICINE

## 2024-05-28 PROCEDURE — 1159F MED LIST DOCD IN RCRD: CPT | Mod: CPTII,S$GLB,, | Performed by: INTERNAL MEDICINE

## 2024-05-29 ENCOUNTER — TELEPHONE (OUTPATIENT)
Dept: ELECTROPHYSIOLOGY | Facility: CLINIC | Age: 79
End: 2024-05-29
Payer: MEDICARE

## 2024-05-29 ENCOUNTER — TELEPHONE (OUTPATIENT)
Dept: CARDIOLOGY | Facility: CLINIC | Age: 79
End: 2024-05-29
Payer: MEDICARE

## 2024-05-29 ENCOUNTER — PATIENT MESSAGE (OUTPATIENT)
Dept: INTERNAL MEDICINE | Facility: CLINIC | Age: 79
End: 2024-05-29
Payer: MEDICARE

## 2024-05-29 DIAGNOSIS — R35.89 DIURESIS: ICD-10-CM

## 2024-05-29 DIAGNOSIS — R06.02 SHORTNESS OF BREATH: Primary | ICD-10-CM

## 2024-05-29 DIAGNOSIS — R60.9 EDEMA, UNSPECIFIED TYPE: ICD-10-CM

## 2024-05-29 DIAGNOSIS — I50.32 CHRONIC DIASTOLIC HEART FAILURE: ICD-10-CM

## 2024-05-29 RX ORDER — FUROSEMIDE 40 MG/1
40 TABLET ORAL 2 TIMES DAILY
Qty: 180 TABLET | Refills: 3 | Status: SHIPPED | OUTPATIENT
Start: 2024-05-29 | End: 2025-05-29

## 2024-05-29 NOTE — TELEPHONE ENCOUNTER
----- Message from Steph Parekh PA-C sent at 5/29/2024  3:51 PM CDT -----  Regarding: RE: edema  Please call back and let them know I saw Dr. Church is planning a procedure, but not until August. After she has this ablation I expect her heart failure symptoms will improve. Until then we will have to work with the oral diuretic to keep her fluid level as normal as possible. This means very close monitoring of her kidney function. Please increase furosemide to 40 mg twice daily and schedule a BMP for Monday.     Thanks,   Steph  ----- Message -----  From: Joy Lema, GINGER  Sent: 5/29/2024   3:45 PM CDT  To: Joy Lema, RN; #  Subject: edema                                            Spoke w. [facility] nurse and pt's daughter (pt on speaker phone). Pt has 3+ edema to LE.   Wt has gone up,   3 days ago 171#  yesterday 173 #  Today 175#  Some SOB when walking around the house. Lungs clear. On NC 2lpm. Sats 97% sitting down.  She has been taking 40 mg q Am daily (ordered prn).  Refused HFTCC. Cancelled last appt due to hospitalized and rescheduled for 6/7.    Please advise,  ----- Message -----  From: Lisandra Deng MA  Sent: 5/29/2024   3:23 PM CDT  To: Joy Lema, RN    Please call the nurse Lisandra from [facility] she need tot alk to you about the patient fluid in her legs last visit was on 5-7-24 fe/u/ 6-7-24 Steph Parekh. Thank you,

## 2024-05-29 NOTE — TELEPHONE ENCOUNTER
Daughter Marina updated on Ms Parekh' comments/ orders and verbalized understanding. Lab scheduled for Mon and she agreed to date/time of appointment(s). Prescription sent to provider. Teaching done earlier on salt intake.

## 2024-05-29 NOTE — TELEPHONE ENCOUNTER
Spoke with Lisandra at Home [facility] and referred her to Dr Cecy Albarado who is the patient's general Cardiologist who manages her Lasix and HTN. Lashanda hidalgo RN    ----- Message from Bharat Berman MA sent at 5/29/2024  2:51 PM CDT -----  Regarding: FW: Home Health  Contact: Lisandra/[facility] [phone]    ----- Message -----  From: Kristen Muller  Sent: 5/29/2024  12:02 PM CDT  To: Ranjit Mcghee Staff  Subject: Home Health                                      Patient Home Health Nurse Lisandra  calling requesting a callback from nurse or provider. She is reporting that she has a weight gain she currently weight 175. She said on yesterday it was 172.4 and her lower extremities  are hitting 3 and  little shortness of breathe more than usual.  She said her measurements of ankles is right leg is 28.5 cm and the left leg  is 26.5 cm. Please call back as soon as possible.

## 2024-05-30 ENCOUNTER — OFFICE VISIT (OUTPATIENT)
Dept: NEPHROLOGY | Facility: CLINIC | Age: 79
End: 2024-05-30
Payer: MEDICARE

## 2024-05-30 VITALS
HEART RATE: 96 BPM | BODY MASS INDEX: 29.28 KG/M2 | WEIGHT: 171.5 LBS | DIASTOLIC BLOOD PRESSURE: 61 MMHG | SYSTOLIC BLOOD PRESSURE: 92 MMHG | OXYGEN SATURATION: 96 % | HEIGHT: 64 IN

## 2024-05-30 DIAGNOSIS — I95.9 HYPOTENSION, UNSPECIFIED HYPOTENSION TYPE: ICD-10-CM

## 2024-05-30 DIAGNOSIS — I10 ESSENTIAL HYPERTENSION: ICD-10-CM

## 2024-05-30 DIAGNOSIS — N20.0 NEPHROLITHIASIS: ICD-10-CM

## 2024-05-30 DIAGNOSIS — I48.3 TYPICAL ATRIAL FLUTTER: ICD-10-CM

## 2024-05-30 DIAGNOSIS — N10 ACUTE INTERSTITIAL NEPHRITIS: ICD-10-CM

## 2024-05-30 DIAGNOSIS — I50.33 ACUTE ON CHRONIC DIASTOLIC CHF (CONGESTIVE HEART FAILURE): ICD-10-CM

## 2024-05-30 DIAGNOSIS — N18.32 STAGE 3B CHRONIC KIDNEY DISEASE: Primary | ICD-10-CM

## 2024-05-30 PROBLEM — I50.9 ACUTE ON CHRONIC CONGESTIVE HEART FAILURE: Status: ACTIVE | Noted: 2024-05-30

## 2024-05-30 PROCEDURE — 99999 PR PBB SHADOW E&M-EST. PATIENT-LVL IV: CPT | Mod: PBBFAC,GC,,

## 2024-05-30 NOTE — ASSESSMENT & PLAN NOTE
Recently hospital course completed by MARQUEZ 2 to overdiuresis, urine retention, hypotension (87/54).  Scr 1.9 --peaked 2.6 down to 1.4  Ckd 3 (baseline creatinine ~1.5)  Multiple marquez  Upcr 0.20  Ua not recent one.  Repeat PTH/UA  Urinary sediment was assessed which demonstrated innumerable WBCs with some within waxy casts, non dysmorphic RBCs, waxy casts and occasional bacteria. UPCR only 0.2 and retroperitoneal US with changes consistent with chronic medical renal disease, no hydronephrosis but 3 mm nonobstructing right renal stone was noted.

## 2024-05-30 NOTE — PROGRESS NOTES
Nephrology Clinic Note   5/30/2024    No chief complaint on file.     History of present illness:  Patient is a 78 y.o. female with known Ms Jenn is an obese (BMI ~31) 78-year-old woman with HFpEF (most recent TTE with EF 55% with G2DD), mild to moderate tricuspid regurgitation, hypertension, HLD, paroxsymal atrial fibrillation on Eliquis, COPD on supplemental oxygen at home with 2 liters via nasal cannula, atherosclerotic disease, chronic lymphedema, history of left sided breast cancer, CKD IIIb (baseline creatinine ~1.5) . Follow up 1st time at clinic for CKD    Creatinine   Date Value Ref Range Status   05/21/2024 1.4 0.5 - 1.4 mg/dL Final   05/20/2024 1.8 (H) 0.5 - 1.4 mg/dL Final   05/19/2024 2.1 (H) 0.5 - 1.4 mg/dL Final     BUN   Date Value Ref Range Status   05/21/2024 49 (H) 8 - 23 mg/dL Final   05/20/2024 50 (H) 8 - 23 mg/dL Final   05/19/2024 53 (H) 8 - 23 mg/dL Final     CO2   Date Value Ref Range Status   05/21/2024 29 23 - 29 mmol/L Final   05/20/2024 30 (H) 23 - 29 mmol/L Final   05/19/2024 26 23 - 29 mmol/L Final         Proteinuria:   Prot/Creat Ratio, Urine   Date Value Ref Range Status   05/15/2024 0.20 0.00 - 0.20 Final   05/14/2021 0.12 0.00 - 0.20 Final   10/14/2020 0.08 0.00 - 0.20 Final         CKD anemia  Hemoglobin   Date Value Ref Range Status   05/21/2024 11.6 (L) 12.0 - 16.0 g/dL Final   05/20/2024 12.3 12.0 - 16.0 g/dL Final   05/18/2024 10.8 (L) 12.0 - 16.0 g/dL Final     Saturated Iron   Date Value Ref Range Status   06/23/2016 32 20 - 50 % Final     Ferritin   Date Value Ref Range Status   06/23/2016 98 20.0 - 300.0 ng/mL Final     Iron   Date Value Ref Range Status   06/23/2016 115 30 - 160 ug/dL Final     TIBC   Date Value Ref Range Status   06/23/2016 358 250 - 450 ug/dL Final         MBD  PTH, Intact   Date Value Ref Range Status   12/04/2017 110.0 (H) 9.0 - 77.0 pg/mL Final   04/13/2017 148.0 (H) 9.0 - 77.0 pg/mL Final     Calcium   Date Value Ref Range Status   05/21/2024  10.5 8.7 - 10.5 mg/dL Final   05/20/2024 10.5 8.7 - 10.5 mg/dL Final     Ionized Calcium   Date Value Ref Range Status   12/08/2014 1.24 1.06 - 1.42 mmol/L Final   10/21/2010 1.27 1.06 - 1.42 mmol/L Final     Comment:     Please note updated reference ranges and reporting units.     Phosphorus   Date Value Ref Range Status   05/18/2024 2.6 (L) 2.7 - 4.5 mg/dL Final   05/17/2024 2.8 2.7 - 4.5 mg/dL Final     Vit D, 25-Hydroxy   Date Value Ref Range Status   02/15/2023 50 30 - 96 ng/mL Final     Comment:     Vitamin D deficiency.........<10 ng/mL                              Vitamin D insufficiency......10-29 ng/mL       Vitamin D sufficiency........> or equal to 30 ng/mL  Vitamin D toxicity............>100 ng/mL         #HTN  Results for orders placed during the hospital encounter of 05/07/24    Echo    Interpretation Summary    Left Ventricle: The left ventricle is normal in size. Ventricular mass is normal. Normal wall thickness. Normal wall motion. There is normal systolic function. Ejection fraction by visual approximation is 55%. Unable to assess diastolic function due to atrial fibrillation.    Right Ventricle: Normal right ventricular cavity size. Wall thickness is normal. Systolic function is mildly reduced.    Aortic Valve: The aortic valve is a trileaflet valve. There is moderate aortic valve sclerosis. There is annular calcification present. Mildly restricted motion. No stenosis.    Tricuspid Valve: There is mild to moderate regurgitation.    Pulmonary Artery: The estimated pulmonary artery systolic pressure is 44 mmHg.    IVC/SVC: Elevated venous pressure at 15 mmHg.      #DM  Lab Results   Component Value Date    HGBA1C 5.3 05/08/2024       History:  Past Medical History:   Diagnosis Date    Acute hypoxemic respiratory failure 6/26/2021    Acute superficial venous thrombosis of lower extremity, bilateral 1/25/2022    Aortic atherosclerosis     Arthritis     knee joint pain    Asthma-COPD overlap syndrome  8/22/2022    Breast cancer 2002    left breast & lymph nodes-s/p sx with chemo    Bronchitis     with flu-2/2014    Cataracts, bilateral     Chronic anticoagulation 5/4/2022    Chronic diastolic heart failure 12/24/2019    Chronic kidney disease, stage 3     Class 1 obesity due to excess calories with serious comorbidity and body mass index (BMI) of 30.0 to 30.9 in adult 5/16/2024    History of chemotherapy     last treatment 12/2002 (had 8 treatments)    Hyperlipidemia 11/20/2016    Hypertension     Lymphedema of both lower extremities 1/25/2022    Nephrolithiasis 6/2/2014    Osteoporosis     Paroxysmal atrial fibrillation 6/7/2021    Renal calculi     hematuria    Renal osteodystrophy 1/14/2016    Venous stasis dermatitis of both lower extremities 1/25/2022    Vitamin D insufficiency       Past Surgical History:   Procedure Laterality Date    BREAST BIOPSY Left 2002    core bx, +    BREAST BIOPSY Right 2018    core    BREAST BIOPSY Right 2019    BREAST LUMPECTOMY Left 2002    CYSTOSCOPY  4/28/2022    Procedure: CYSTOSCOPY;  Surgeon: Vik Ware MD;  Location: [facility] OR [address] [place] [address];  Service: Urology;;    CYSTOSCOPY  5/30/2022    Procedure: CYSTOSCOPY;  Surgeon: Bonnie Knutson MD;  Location: [facility] OR [address] [place] [address];  Service: Urology;;    LASER LITHOTRIPSY  5/30/2022    Procedure: LITHOTRIPSY, USING LASER;  Surgeon: Bonnie Knutson MD;  [facility];  Service: Urology;;    LITHOTRIPSY      MASTECTOMY Left 06/2002    left-& lymph node dissection    REPLACEMENT OF STENT Right 5/30/2022    Procedure: REPLACEMENT, STENT;  Surgeon: Bonnie Knutson MD;  [facility];  Service: Urology;  Laterality: Right;    RETROGRADE PYELOGRAPHY Right 4/28/2022    Procedure: PYELOGRAM, RETROGRADE;  Surgeon: Vik Ware MD;  Location: [facility] OR [address] [place] [address];  Service: Urology;  Laterality: Right;    ROBOT-ASSISTED LAPAROSCOPIC PARTIAL NEPHRECTOMY USING DA JESÚS XI Right 3/11/2021     Procedure: XI ROBOTIC NEPHRECTOMY, PARTIAL;  Surgeon: Taz Ortega MD;  Location: [facility] OR 2ND FLR;  Service: Urology;  Laterality: Right;  4hr/ gen with regional  [facility] confirmation:  [id number] for 11:15am case NC    URETEROSCOPIC REMOVAL OF URETERIC CALCULUS Right 5/30/2022    Procedure: REMOVAL, CALCULUS, URETER, URETEROSCOPIC;  Surgeon: Bonnie Knutson MD;  Location: [facility] OR 1ST FLR;  Service: Urology;  Laterality: Right;    ureteroscopy Bilateral     6.14    URETEROSCOPY Right 5/30/2022    Procedure: URETEROSCOPY;  Surgeon: Bonnie Knutson MD;  Location: [facility] OR 1ST FLR;  Service: Urology;  Laterality: Right;        Current Outpatient Medications:     acetaminophen (TYLENOL) 500 MG tablet, Take 2 tablets (1,000 mg total) by mouth every 8 (eight) hours as needed for Pain., Disp: 42 tablet, Rfl: 0    albuterol (PROVENTIL HFA) 90 mcg/actuation inhaler, Inhale 2 puffs into the lungs every 6 (six) hours as needed for Wheezing. Rescue, Disp: 18 g, Rfl: 3    apixaban (ELIQUIS) 2.5 mg Tab, Take 1 tablet (2.5 mg total) by mouth 2 (two) times a day., Disp: 60 tablet, Rfl: 11    aspirin (ECOTRIN) 81 MG EC tablet, Take 81 mg by mouth once daily., Disp: , Rfl:     biotin 1 mg tablet, Take 1,000 mcg by mouth once daily., Disp: , Rfl:     budesonide-glycopyr-formoterol (BREZTRI AEROSPHERE) 160-9-4.8 mcg/actuation HFAA, Inhale 2 puffs into the lungs 2 (two) times daily., Disp: 32.1 g, Rfl: 3    busPIRone (BUSPAR) 5 MG Tab, Take 5 mg by mouth as needed., Disp: , Rfl:     fluticasone propionate (FLONASE) 50 mcg/actuation nasal spray, 1 spray by Each Nostril route daily as needed (congestion)., Disp: , Rfl:     furosemide (LASIX) 40 MG tablet, Take 1 tablet (40 mg total) by mouth 2 (two) times daily., Disp: 180 tablet, Rfl: 3    inhalation spacing device, Use as directed for inhalation., Disp: 1 Device, Rfl: 0    metoprolol succinate (TOPROL-XL) 100 MG 24 hr tablet, Take 1 tablet (100 mg total) by mouth once  daily., Disp: 90 tablet, Rfl: 3    rosuvastatin (CRESTOR) 5 MG tablet, Take 1 tablet (5 mg total) by mouth once daily., Disp: 90 tablet, Rfl: 3    tamsulosin (FLOMAX) 0.4 mg Cap, Take 1 capsule (0.4 mg total) by mouth once daily., Disp: 30 capsule, Rfl: 11    vitamin D (VITAMIN D3) 1000 units Tab, Take 2 tablets (2,000 Units total) by mouth once daily., Disp: 180 tablet, Rfl: 3  Review of patient's allergies indicates:   Allergen Reactions    Adhesive tape-silicones     Adhesive Rash     Pt states she removed a LATEX bandaid and the skin beneath was swollen and red. No other latex causes a reaction.      Social History     Tobacco Use    Smoking status: Former     Current packs/day: 0.00     Average packs/day: 1 pack/day for 50.0 years (50.0 ttl pk-yrs)     Types: Cigarettes     Start date: 1960     Quit date: 5/20/2009     Years since quitting: 15.0    Smokeless tobacco: Never   Substance Use Topics    Alcohol use: No      Family History   Problem Relation Name Age of Onset    Bone cancer Mother Bibi     Breast cancer Mother Bibi     Arthritis Mother Bibi         Breast Cancer    Breast cancer Sister      Cancer Father Maxwell         Asbestos cancer    No Known Problems Brother      Fibroids Daughter      Crohn's disease Daughter      No Known Problems Daughter      Arthritis Sister          Breast Cancer    Anesthesia problems Neg Hx      Glaucoma Neg Hx          Physical Exam :  Vitals:    05/30/24 1456   BP: 92/61   Pulse: 96     Physical Exam  Vitals reviewed.   Constitutional:       Appearance: Normal appearance.   HENT:      Head: Normocephalic.      Mouth/Throat:      Mouth: Mucous membranes are moist.   Cardiovascular:      Rate and Rhythm: Normal rate and regular rhythm.      Pulses: Normal pulses.   Pulmonary:      Effort: Pulmonary effort is normal.      Breath sounds: Normal breath sounds.   Musculoskeletal:         General: Normal range of motion.      Cervical back: Normal range of motion.       Right lower leg: Edema present.      Left lower leg: Edema present.   Skin:     General: Skin is warm.   Neurological:      General: No focal deficit present.      Mental Status: She is alert and oriented to person, place, and time.   Psychiatric:         Mood and Affect: Mood normal.         Behavior: Behavior normal.       Labs reviewed   Images Reviewed    Assessment:    1. Stage 3b chronic kidney disease    2. Nephrolithiasis    3. Typical atrial flutter    4. Hypotension, unspecified hypotension type    5. Essential hypertension    6. Acute on chronic congestive heart failure    7. Acute interstitial nephritis    8. Acute on chronic diastolic CHF (congestive heart failure)        Plan:    CKD (chronic kidney disease) stage 3, GFR 30-59 ml/min  Recently hospital course completed by MARQUEZ 2 to bactrim, over diuresis, urine retention, hypotension (87/54). Vs AIN?  Scr 1.9 --peaked 2.6 down to 1.4  Ckd 3 (baseline creatinine ~1.5)  Multiple marquez  Upcr 0.20    Urinary sediment during Hospital Visit innumerable WBCs with some within waxy casts, non dysmorphic RBCs, waxy casts and occasional bacteria. UPCR only 0.2 and retroperitoneal US with changes consistent with chronic medical renal disease, no hydronephrosis but 3 mm nonobstructing right renal stone was noted.    Plan  Flomax use at bed time  Titrate Lasix per weight target 168 to 170 LB  Renal panel , UA, UPCR, BNP, PTH.    Nephrolithiasis  -3 mm nonobstructing right renal stone.    Typical atrial flutter  -    Hypotension  -    Essential hypertension    Meds  -on metoprolol 100mg daily  -Lasix 40 mg BID           Orders Placed This Encounter   Procedures    Protein / creatinine ratio, urine    Urinalysis    PTH, intact    B-TYPE NATRIURETIC PEPTIDE     There are no discontinued medications.   Future Appointments   Date Time Provider Department Center   6/3/2024  2:30 PM LAB, [facility] [place]   6/3/2024  3:00 PM Ana Pinzon NP [facility] URO [place]    6/7/2024  2:30 PM Steph Parekh PA-C [facility]   6/17/2024  2:40 PM Ryder Qiu MD [facility] PULMON [place]   7/10/2024  2:40 PM Valerio Andino MD [facility] CARDIO [facility]   8/20/2024 11:00 AM LAB, APPOINTMENT [facility] INTMED [facility] LAB IM [facility]   8/29/2024  5:15 AM 3PREP, [facility] SSCU [facility]   8/29/2024  8:00 AM INPATIENT, MAK[facility] ECHOSTR [facility]   9/6/2024 10:15 AM LAB, KARRIE [facility] LAB [place]   9/12/2024 11:00 AM Clemencia Boo MD [facility]   9/19/2024  1:00 PM Ibeth Christy MD [facility] NEPHRO [facility]         Patient case & plan was discussed with attending, Dr. NORA Christy M.D.   Nephrology  [facility]

## 2024-05-31 ENCOUNTER — PATIENT MESSAGE (OUTPATIENT)
Dept: ELECTROPHYSIOLOGY | Facility: CLINIC | Age: 79
End: 2024-05-31
Payer: MEDICARE

## 2024-05-31 ENCOUNTER — TELEPHONE (OUTPATIENT)
Dept: ELECTROPHYSIOLOGY | Facility: CLINIC | Age: 79
End: 2024-05-31
Payer: MEDICARE

## 2024-05-31 NOTE — TELEPHONE ENCOUNTER
Returned daughter's call concerning moving patient up on the schedule. Assured daughter that patient is at the top of the waiting list and I will do my best to mpve her up as soon as possible. Daughter verbalized understanding

## 2024-06-03 ENCOUNTER — OFFICE VISIT (OUTPATIENT)
Dept: UROLOGY | Facility: CLINIC | Age: 79
End: 2024-06-03
Payer: MEDICARE

## 2024-06-03 ENCOUNTER — PATIENT MESSAGE (OUTPATIENT)
Dept: NEPHROLOGY | Facility: CLINIC | Age: 79
End: 2024-06-03
Payer: MEDICARE

## 2024-06-03 VITALS — DIASTOLIC BLOOD PRESSURE: 50 MMHG | HEART RATE: 73 BPM | SYSTOLIC BLOOD PRESSURE: 79 MMHG

## 2024-06-03 DIAGNOSIS — I50.9 ACUTE ON CHRONIC CONGESTIVE HEART FAILURE: ICD-10-CM

## 2024-06-03 DIAGNOSIS — N39.0 RECURRENT UTI: Primary | ICD-10-CM

## 2024-06-03 PROCEDURE — 99214 OFFICE O/P EST MOD 30 MIN: CPT | Mod: S$GLB,,,

## 2024-06-03 PROCEDURE — 3074F SYST BP LT 130 MM HG: CPT | Mod: CPTII,S$GLB,,

## 2024-06-03 PROCEDURE — 1126F AMNT PAIN NOTED NONE PRSNT: CPT | Mod: CPTII,S$GLB,,

## 2024-06-03 PROCEDURE — 1111F DSCHRG MED/CURRENT MED MERGE: CPT | Mod: CPTII,S$GLB,,

## 2024-06-03 PROCEDURE — 1160F RVW MEDS BY RX/DR IN RCRD: CPT | Mod: CPTII,S$GLB,,

## 2024-06-03 PROCEDURE — 1101F PT FALLS ASSESS-DOCD LE1/YR: CPT | Mod: CPTII,S$GLB,,

## 2024-06-03 PROCEDURE — 3288F FALL RISK ASSESSMENT DOCD: CPT | Mod: CPTII,S$GLB,,

## 2024-06-03 PROCEDURE — 99999 PR PBB SHADOW E&M-EST. PATIENT-LVL III: CPT | Mod: PBBFAC,,,

## 2024-06-03 PROCEDURE — 3078F DIAST BP <80 MM HG: CPT | Mod: CPTII,S$GLB,,

## 2024-06-03 PROCEDURE — 1159F MED LIST DOCD IN RCRD: CPT | Mod: CPTII,S$GLB,,

## 2024-06-03 NOTE — PROGRESS NOTES
CHIEF COMPLAINT:    Mr. Barajas is a 78 y.o. female presenting for a consultation at the request of Adina Wolff PA-C. Patient presents with hx of urinary retention and recurrent UTI.    PRESENTING ILLNESS:    Misa Barajas is a 78 y.o. female who has a history of urinary retention recurrent UTI.  She reports having 2 culture proven UTIs within the last 3 months.  Her symptoms include dysuria, urinary frequency.  She has not been admitted for a UTI in the past. Risk factors include post menopausal, vaginal atrophy symptoms, and constipation. She reports her recent episode of urinary retention has resolved since a successful voiding trial 2 weeks ago. She denies any dysuria, frequency, hematuria, flank pain, suprapubic pain or feeling of incomplete emptying. She does currently have a fluid restriction of 1.5 L/day. She reports being compliant with that. She is still currently taking Flomax.     Patient confirms a history of breast cancer.  She is not on an estrogen receptor blocker.     REVIEW OF SYSTEMS:    Review of Systems   Constitutional: Negative.    HENT: Negative.     Eyes: Negative.    Respiratory:          On home o2   Cardiovascular: Negative.    Gastrointestinal: Negative.    Genitourinary: Negative.    Musculoskeletal: Negative.         Using wheelchair for mobility   Skin: Negative.    Neurological: Negative.    Endo/Heme/Allergies: Negative.    Psychiatric/Behavioral: Negative.       PATIENT HISTORY:    Past Medical History:   Diagnosis Date    Acute hypoxemic respiratory failure 6/26/2021    Acute superficial venous thrombosis of lower extremity, bilateral 1/25/2022    Aortic atherosclerosis     Arthritis     knee joint pain    Asthma-COPD overlap syndrome 8/22/2022    Breast cancer 2002    left breast & lymph nodes-s/p sx with chemo    Bronchitis     with flu-2/2014    Cataracts, bilateral     Chronic anticoagulation 5/4/2022    Chronic diastolic heart failure 12/24/2019    Chronic kidney  disease, stage 3     Class 1 obesity due to excess calories with serious comorbidity and body mass index (BMI) of 30.0 to 30.9 in adult 5/16/2024    History of chemotherapy     last treatment 12/2002 (had 8 treatments)    Hyperlipidemia 11/20/2016    Hypertension     Lymphedema of both lower extremities 1/25/2022    Nephrolithiasis 6/2/2014    Osteoporosis     Paroxysmal atrial fibrillation 6/7/2021    Renal calculi     hematuria    Renal osteodystrophy 1/14/2016    Venous stasis dermatitis of both lower extremities 1/25/2022    Vitamin D insufficiency        Past Surgical History:   Procedure Laterality Date    BREAST BIOPSY Left 2002    core bx, +    BREAST BIOPSY Right 2018    core    BREAST BIOPSY Right 2019    BREAST LUMPECTOMY Left 2002    CYSTOSCOPY  4/28/2022    Procedure: CYSTOSCOPY;  Surgeon: Vik Ware MD;  [facility];  Service: Urology;;    CYSTOSCOPY  5/30/2022    Procedure: CYSTOSCOPY;  Surgeon: Bonnie Knutson MD;  [facility];  Service: Urology;;    LASER LITHOTRIPSY  5/30/2022    Procedure: LITHOTRIPSY, USING LASER;  Surgeon: Bonnie Knutson MD;  Location: [facility] OR 1ST FLR;  Service: Urology;;    LITHOTRIPSY      MASTECTOMY Left 06/2002    left-& lymph node dissection    REPLACEMENT OF STENT Right 5/30/2022    Procedure: REPLACEMENT, STENT;  Surgeon: Bonnie Knutson MD;  Location: [facility] OR 1ST FLR;  Service: Urology;  Laterality: Right;    RETROGRADE PYELOGRAPHY Right 4/28/2022    Procedure: PYELOGRAM, RETROGRADE;  Surgeon: Vik Ware MD;  Location: [facility] OR 1ST FLR;  Service: Urology;  Laterality: Right;    ROBOT-ASSISTED LAPAROSCOPIC PARTIAL NEPHRECTOMY USING DA JESÚS XI Right 3/11/2021    Procedure: XI ROBOTIC NEPHRECTOMY, PARTIAL;  Surgeon: Taz Ortega MD;  Location: [facility] OR 2ND FLR;  Service: Urology;  Laterality: Right;  4hr/ gen with regional  [facility] confirmation:  [id number] for 11:15am case [place]    URETEROSCOPIC REMOVAL  OF URETERIC CALCULUS Right 5/30/2022    Procedure: REMOVAL, CALCULUS, URETER, URETEROSCOPIC;  Surgeon: Bonnie Knutson MD;  Location: [facility] OR [address] [place] [address];  Service: Urology;  Laterality: Right;    ureteroscopy Bilateral     6.14    URETEROSCOPY Right 5/30/2022    Procedure: URETEROSCOPY;  Surgeon: Bonnie Knutson MD;  [facility];  Service: Urology;  Laterality: Right;       Family History   Problem Relation Name Age of Onset    Bone cancer Mother Bibi     Breast cancer Mother Bibi     Arthritis Mother Bibi         Breast Cancer    Breast cancer Sister      Cancer Father Maxwell         Asbestos cancer    No Known Problems Brother      Fibroids Daughter      Crohn's disease Daughter      No Known Problems Daughter      Arthritis Sister          Breast Cancer    Anesthesia problems Neg Hx      Glaucoma Neg Hx         Social History     Socioeconomic History    Marital status:    Tobacco Use    Smoking status: Former     Current packs/day: 0.00     Average packs/day: 1 pack/day for 50.0 years (50.0 ttl pk-yrs)     Types: Cigarettes     Start date: 1960     Quit date: 5/20/2009     Years since quitting: 15.0    Smokeless tobacco: Never   Substance and Sexual Activity    Alcohol use: No    Drug use: No    Sexual activity: Not Currently     Partners: Male     Birth control/protection: Partner-Vasectomy     Social Determinants of Health     Financial Resource Strain: Low Risk  (3/3/2024)    Overall Financial Resource Strain (CARDIA)     Difficulty of Paying Living Expenses: Not very hard   Food Insecurity: No Food Insecurity (3/3/2024)    Hunger Vital Sign     Worried About Running Out of Food in the Last Year: Never true     Ran Out of Food in the Last Year: Never true   Transportation Needs: No Transportation Needs (3/3/2024)    PRAPARE - Transportation     Lack of Transportation (Medical): No     Lack of Transportation (Non-Medical): No   Physical Activity: Inactive (3/3/2024)     Exercise Vital Sign     Days of Exercise per Week: 0 days     Minutes of Exercise per Session: 0 min   Stress: No Stress Concern Present (3/3/2024)    [place] [employer] - Occupational Stress Questionnaire     Feeling of Stress : Not at all   Housing Stability: Low Risk  (3/3/2024)    Housing Stability Vital Sign     Unable to Pay for Housing in the Last Year: No     Number of Places Lived in the Last Year: 1     Unstable Housing in the Last Year: No       Allergies:  Adhesive tape-silicones and Adhesive    Medications:  Outpatient Encounter Medications as of 6/3/2024   Medication Sig Dispense Refill    acetaminophen (TYLENOL) 500 MG tablet Take 2 tablets (1,000 mg total) by mouth every 8 (eight) hours as needed for Pain. 42 tablet 0    albuterol (PROVENTIL HFA) 90 mcg/actuation inhaler Inhale 2 puffs into the lungs every 6 (six) hours as needed for Wheezing. Rescue 18 g 3    apixaban (ELIQUIS) 2.5 mg Tab Take 1 tablet (2.5 mg total) by mouth 2 (two) times a day. 60 tablet 11    aspirin (ECOTRIN) 81 MG EC tablet Take 81 mg by mouth once daily.      biotin 1 mg tablet Take 1,000 mcg by mouth once daily.      budesonide-glycopyr-formoterol (BREZTRI AEROSPHERE) 160-9-4.8 mcg/actuation HFAA Inhale 2 puffs into the lungs 2 (two) times daily. 32.1 g 3    busPIRone (BUSPAR) 5 MG Tab Take 5 mg by mouth as needed.      fluticasone propionate (FLONASE) 50 mcg/actuation nasal spray 1 spray by Each Nostril route daily as needed (congestion).      furosemide (LASIX) 40 MG tablet Take 1 tablet (40 mg total) by mouth 2 (two) times daily. 180 tablet 3    inhalation spacing device Use as directed for inhalation. 1 Device 0    metoprolol succinate (TOPROL-XL) 100 MG 24 hr tablet Take 1 tablet (100 mg total) by mouth once daily. 90 tablet 3    rosuvastatin (CRESTOR) 5 MG tablet Take 1 tablet (5 mg total) by mouth once daily. 90 tablet 3    tamsulosin (FLOMAX) 0.4 mg Cap Take 1 capsule (0.4 mg total) by mouth once  daily. 30 capsule 11    vitamin D (VITAMIN D3) 1000 units Tab Take 2 tablets (2,000 Units total) by mouth once daily. 180 tablet 3     No facility-administered encounter medications on file as of 6/3/2024.       PHYSICAL EXAMINATION:    The patient generally appears in good health, is appropriately interactive, and is in no apparent distress.    Skin: No lesions.    Mental: Cooperative with normal affect.    Neuro: Grossly intact.    HEENT: Normal. No evidence of lymphadenopathy.    Chest:  normal inspiratory effort.    Abdomen: Soft, non-tender. No masses or organomegaly. Bladder is not palpable. No evidence of flank discomfort. No evidence of inguinal hernia.    Extremities: No clubbing, cyanosis, or edema     Bladder scan PVR 0 mL    LABS:    Lab Results   Component Value Date    BUN 49 (H) 2024    CREATININE 1.4 2024       IMPRESSION:    Encounter Diagnoses   Name Primary?    Acute on chronic congestive heart failure        PLAN:    1.  Cystoscopy scheduled   2. Begin taking D-mannose 1000 mg bid. Handout given.   4.  Probiotics are available over the counter.  Optimum dose is 25-50 billion.  Make sure it has multiple strains (greater than 10).  If you would like a specific one, [facility] Ultra 25 Billion CFU Probiotic Complex, Multi Strain.  The Multi Strain is specifically the one that is important as the greater variety of strains is better.  Make sure the product is not .    5.  Topical estrogen was not prescribed.    6. Ok to stop taking Flomax - concerned about polypharmacy. Instructions given regarding s/s of UTI and urinary retention. Understood if retention recurred, she would need to go to the ED for catheterization.     I spent 30 minutes with the patient of which more than half was spent in direct consultation with the patient in regards to our treatment and plan.

## 2024-06-04 ENCOUNTER — TELEPHONE (OUTPATIENT)
Dept: CARDIOLOGY | Facility: CLINIC | Age: 79
End: 2024-06-04
Payer: MEDICARE

## 2024-06-04 NOTE — TELEPHONE ENCOUNTER
----- Message from Steph Parekh PA-C sent at 6/4/2024  1:16 PM CDT -----  Regarding: RE: jose guerrero  Please have her go back to the lab for BMP on Thursday. Ideally we would check her renal function prior to the weekend.     Thanks,   Steph  ----- Message -----  From: Joy Lema, RN  Sent: 6/3/2024  11:08 AM CDT  To: Joy Lema, RN; #  Subject: wt irvin                                          Spoke w. home health nurse and daughter Marina. On 5/29, pt was told to increase furosemide from 40 qd to 40 bid. On 5/30 pt or daughter Kai was told to change the extra PM furosemide to qod and change back to qd when wt back at goal of 170#.  On Saturday wt was 170 # and Sunday 168 #. Pt did not receive the Pm dose on both days.  Daughter stated that she does not trust scale but wts are Am wts before getting dressed. Per nurse, left ankle circumference today increased from 24 to 27 cm.   I advised daughter to have pt restart today the furosemide in pm qod, cancelled the lab for today.  I told her to call me back for update on Wednesday and she verbalized understanding.    Please advise on the above and when to reschedule lab.  ----- Message -----  From: Cory Viera MA  Sent: 6/3/2024  10:39 AM CDT  To: Joy Lema RN  Subject: FW: Irvin                                           ----- Message -----  From: Deisy Bradshaw  Sent: 6/3/2024  10:11 AM CDT  To: [facility]  Subject: Irvin                                             Kate home health nurse calling to give patient weight yesterday 168.6 today 172.2. not sure if the scale is ready correctly and will take an extra lasix. Please call back @ [phone]/ patient [phone]. Thank you Deisy

## 2024-06-04 NOTE — TELEPHONE ENCOUNTER
Spoke w. zach Gray. Due to the difficulties getting pt anywhere, needs  2 people, she wondered if HH could draw the blood at home. Spoke w. [facility] and later to nurse Nain. She stated that they will draw the lab on Thursday at pt's home and rescheduling wound care from Friday to Thursday. Zach Gray was updated and verbalized understanding.

## 2024-06-06 ENCOUNTER — LAB VISIT (OUTPATIENT)
Dept: LAB | Facility: HOSPITAL | Age: 79
End: 2024-06-06
Attending: PHYSICIAN ASSISTANT
Payer: MEDICARE

## 2024-06-06 DIAGNOSIS — I13.0 HYPERTENSIVE HEART AND RENAL DISEASE WITH CONGESTIVE HEART FAILURE: Primary | ICD-10-CM

## 2024-06-06 LAB
ANION GAP SERPL CALC-SCNC: 8 MMOL/L (ref 8–16)
BUN SERPL-MCNC: 16 MG/DL (ref 8–23)
CALCIUM SERPL-MCNC: 10.1 MG/DL (ref 8.7–10.5)
CHLORIDE SERPL-SCNC: 103 MMOL/L (ref 95–110)
CO2 SERPL-SCNC: 28 MMOL/L (ref 23–29)
CREAT SERPL-MCNC: 1.3 MG/DL (ref 0.5–1.4)
EST. GFR  (NO RACE VARIABLE): 41.6 ML/MIN/1.73 M^2
GLUCOSE SERPL-MCNC: 92 MG/DL (ref 70–110)
POTASSIUM SERPL-SCNC: 3.7 MMOL/L (ref 3.5–5.1)
SODIUM SERPL-SCNC: 139 MMOL/L (ref 136–145)

## 2024-06-06 PROCEDURE — 80048 BASIC METABOLIC PNL TOTAL CA: CPT | Performed by: PHYSICIAN ASSISTANT

## 2024-06-12 ENCOUNTER — LAB VISIT (OUTPATIENT)
Dept: LAB | Facility: HOSPITAL | Age: 79
End: 2024-06-12
Attending: INTERNAL MEDICINE
Payer: MEDICARE

## 2024-06-12 DIAGNOSIS — I48.3 TYPICAL ATRIAL FLUTTER: ICD-10-CM

## 2024-06-12 DIAGNOSIS — I49.9 CARDIAC ARRHYTHMIA, UNSPECIFIED CARDIAC ARRHYTHMIA TYPE: ICD-10-CM

## 2024-06-12 LAB
ANION GAP SERPL CALC-SCNC: 9 MMOL/L (ref 8–16)
APTT PPP: 27.3 SEC (ref 21–32)
BUN SERPL-MCNC: 19 MG/DL (ref 8–23)
CALCIUM SERPL-MCNC: 10.3 MG/DL (ref 8.7–10.5)
CHLORIDE SERPL-SCNC: 104 MMOL/L (ref 95–110)
CO2 SERPL-SCNC: 26 MMOL/L (ref 23–29)
CREAT SERPL-MCNC: 1.4 MG/DL (ref 0.5–1.4)
ERYTHROCYTE [DISTWIDTH] IN BLOOD BY AUTOMATED COUNT: 13.6 % (ref 11.5–14.5)
EST. GFR  (NO RACE VARIABLE): 38 ML/MIN/1.73 M^2
GLUCOSE SERPL-MCNC: 107 MG/DL (ref 70–110)
HCT VFR BLD AUTO: 36.7 % (ref 37–48.5)
HGB BLD-MCNC: 11.8 G/DL (ref 12–16)
INR PPP: 1.1 (ref 0.8–1.2)
MCH RBC QN AUTO: 29.7 PG (ref 27–31)
MCHC RBC AUTO-ENTMCNC: 32.2 G/DL (ref 32–36)
MCV RBC AUTO: 92 FL (ref 82–98)
PLATELET # BLD AUTO: 170 K/UL (ref 150–450)
PMV BLD AUTO: 10.3 FL (ref 9.2–12.9)
POTASSIUM SERPL-SCNC: 3.7 MMOL/L (ref 3.5–5.1)
PROTHROMBIN TIME: 11.5 SEC (ref 9–12.5)
RBC # BLD AUTO: 3.97 M/UL (ref 4–5.4)
SODIUM SERPL-SCNC: 139 MMOL/L (ref 136–145)
WBC # BLD AUTO: 8 K/UL (ref 3.9–12.7)

## 2024-06-12 PROCEDURE — 85027 COMPLETE CBC AUTOMATED: CPT | Performed by: INTERNAL MEDICINE

## 2024-06-12 PROCEDURE — 85610 PROTHROMBIN TIME: CPT | Performed by: INTERNAL MEDICINE

## 2024-06-12 PROCEDURE — 36415 COLL VENOUS BLD VENIPUNCTURE: CPT | Performed by: INTERNAL MEDICINE

## 2024-06-12 PROCEDURE — 80048 BASIC METABOLIC PNL TOTAL CA: CPT | Performed by: INTERNAL MEDICINE

## 2024-06-12 PROCEDURE — 85730 THROMBOPLASTIN TIME PARTIAL: CPT | Performed by: INTERNAL MEDICINE

## 2024-06-14 ENCOUNTER — TELEPHONE (OUTPATIENT)
Dept: ELECTROPHYSIOLOGY | Facility: CLINIC | Age: 79
End: 2024-06-14
Payer: MEDICARE

## 2024-06-14 NOTE — TELEPHONE ENCOUNTER
Spoke to Patients daughter Marina    CONFIRMED procedure arrival time of 12n    Reiterated instructions including:  -Directions to check in desk  -NPO after midnight night prior to procedure  -High importance of HOLDING Eliquis on the morning of the procedure.   --Pre-procedure LABS completed and reviewed.   -Confirmed absence or presence of implanted device/stimulator No devices or stimulators.  -Confirmed no fever, cough, or shortness of breath in the past 30 days  -Confirmed no redness, rash, irritation, or yeast infection to groin area.   -Confirmed patient is not taking any GLP-1 medications.    Of note patients daughter states she does have metallic flags in chest from markers for biopsy.     Patient verbalized understanding of above and appreciated the call.

## 2024-06-16 ENCOUNTER — ANESTHESIA EVENT (OUTPATIENT)
Dept: MEDSURG UNIT | Facility: HOSPITAL | Age: 79
End: 2024-06-16
Payer: MEDICARE

## 2024-06-16 NOTE — ANESTHESIA PREPROCEDURE EVALUATION
06/16/2024  Misa Barajas is a 78 y.o., female.  Patient Active Problem List   Diagnosis    Nephrolithiasis    Essential hypertension    Secondary hyperparathyroidism of renal origin    Drug-induced polyneuropathy    Osteoarthritis of knee    Renal osteodystrophy    Aortic atherosclerosis    RBBB    Arthritis of facet joints at multiple vertebral levels    Breast calcification, right    CKD (chronic kidney disease) stage 3, GFR 30-59 ml/min    Calcified granuloma of lung    Overweight with body mass index (BMI) of 27 to 27.9 in adult    Varicose veins of both legs with edema    Acute on chronic diastolic heart failure    Paroxysmal atrial fibrillation    Hyperlipidemia    Centrilobular emphysema    Chronic respiratory failure with hypoxia    Lymphedema of both lower extremities    Venous insufficiency of both lower extremities    Venous stasis dermatitis of both lower extremities    Right ureteral stone    Acute renal failure superimposed on stage 3b chronic kidney disease    Right sided hydroureteronephrosis    Chronic anticoagulation    Epigastric abdominal pain    Osteoporosis    Asthma-COPD overlap syndrome    Senile purpura    Hypokalemia    Hypomagnesemia    Unspecified inflammatory spondylopathy, multiple sites in spine    Acute on chronic diastolic CHF (congestive heart failure)    Elevated troponin    Wound of right lower extremity    Acute cystitis    Urinary retention    Hypophosphatemia    Hypermagnesemia    Acute interstitial nephritis    Class 1 obesity due to excess calories with serious comorbidity and body mass index (BMI) of 30.0 to 30.9 in adult    Hypotension    Orthostatic hypotension    Typical atrial flutter    Acute on chronic congestive heart failure           Pre-op Assessment    I have reviewed the Patient Summary Reports.       I have reviewed the Medications.     Review of  Systems  Anesthesia Hx:  No problems with previous Anesthesia               Denies Personal Hx of Anesthesia complications.                    Cardiovascular:     Hypertension    Dysrhythmias   CHF              Congestive Heart Failure (CHF)                Hypertension     Disorder of Cardiac Rhythm, Atrial Fibrillation     Pulmonary:   COPD Asthma       Asthma:   Chronic Obstructive Pulmonary Disease (COPD):                      Renal/:  Chronic Renal Disease        Kidney Function/Disease             Musculoskeletal:  Arthritis        Arthritis          Neurological:    Neuromuscular Disease,           Arthritis                         Neuromuscular Disease       Physical Exam    Airway:  No airway management difficulties anticipated  Dental:  No active dental issues noted  Chest/Lungs:  Clear to auscultation    Heart:  Rate: Normal  Rhythm: Regular Rhythm  Sounds: Normal        Anesthesia Plan  Type of Anesthesia, risks & benefits discussed:    Anesthesia Type: Gen Natural Airway, Gen Supraglottic Airway  Informed Consent: Informed consent signed with the Patient and all parties understand the risks and agree with anesthesia plan.  All questions answered.   ASA Score: 3  Anesthesia Plan Notes: Chart reviewed. Patient seen and examined. Anesthesia plan discussed and questions answered. E-consent signed. Baltazar Cavazos MD    Ready For Surgery From Anesthesia Perspective.     .

## 2024-06-17 ENCOUNTER — ANESTHESIA (OUTPATIENT)
Dept: MEDSURG UNIT | Facility: HOSPITAL | Age: 79
End: 2024-06-17
Payer: MEDICARE

## 2024-06-17 ENCOUNTER — HOSPITAL ENCOUNTER (OUTPATIENT)
Facility: HOSPITAL | Age: 79
Discharge: HOME | End: 2024-06-17
Attending: INTERNAL MEDICINE | Admitting: INTERNAL MEDICINE
Payer: MEDICARE

## 2024-06-17 ENCOUNTER — HOSPITAL ENCOUNTER (OUTPATIENT)
Dept: CARDIOLOGY | Facility: HOSPITAL | Age: 79
Discharge: HOME | End: 2024-06-17
Attending: INTERNAL MEDICINE | Admitting: INTERNAL MEDICINE
Payer: MEDICARE

## 2024-06-17 VITALS
SYSTOLIC BLOOD PRESSURE: 126 MMHG | HEIGHT: 64 IN | RESPIRATION RATE: 18 BRPM | OXYGEN SATURATION: 98 % | WEIGHT: 166 LBS | DIASTOLIC BLOOD PRESSURE: 73 MMHG | TEMPERATURE: 98 F | BODY MASS INDEX: 28.34 KG/M2 | HEART RATE: 78 BPM

## 2024-06-17 VITALS
SYSTOLIC BLOOD PRESSURE: 128 MMHG | DIASTOLIC BLOOD PRESSURE: 64 MMHG | BODY MASS INDEX: 28.34 KG/M2 | HEART RATE: 77 BPM | HEIGHT: 64 IN | WEIGHT: 166 LBS

## 2024-06-17 DIAGNOSIS — I48.3 TYPICAL ATRIAL FLUTTER: ICD-10-CM

## 2024-06-17 DIAGNOSIS — I48.92 ATRIAL FLUTTER: ICD-10-CM

## 2024-06-17 LAB
BSA FOR ECHO PROCEDURE: 1.84 M2
OHS QRS DURATION: 150 MS
OHS QTC CALCULATION: 516 MS

## 2024-06-17 PROCEDURE — C1894 INTRO/SHEATH, NON-LASER: HCPCS | Performed by: INTERNAL MEDICINE

## 2024-06-17 PROCEDURE — 27201423 OPTIME MED/SURG SUP & DEVICES STERILE SUPPLY: Performed by: INTERNAL MEDICINE

## 2024-06-17 PROCEDURE — 37000008 HC ANESTHESIA 1ST 15 MINUTES: Performed by: INTERNAL MEDICINE

## 2024-06-17 PROCEDURE — 93320 DOPPLER ECHO COMPLETE: CPT | Mod: 26,,, | Performed by: INTERNAL MEDICINE

## 2024-06-17 PROCEDURE — 25000003 PHARM REV CODE 250: Performed by: NURSE ANESTHETIST, CERTIFIED REGISTERED

## 2024-06-17 PROCEDURE — 25000003 PHARM REV CODE 250: Performed by: INTERNAL MEDICINE

## 2024-06-17 PROCEDURE — 63600175 PHARM REV CODE 636 W HCPCS: Performed by: NURSE ANESTHETIST, CERTIFIED REGISTERED

## 2024-06-17 PROCEDURE — 94761 N-INVAS EAR/PLS OXIMETRY MLT: CPT

## 2024-06-17 PROCEDURE — 93005 ELECTROCARDIOGRAM TRACING: CPT

## 2024-06-17 PROCEDURE — 27000221 HC OXYGEN, UP TO 24 HOURS

## 2024-06-17 PROCEDURE — 93010 ELECTROCARDIOGRAM REPORT: CPT | Mod: ,,, | Performed by: INTERNAL MEDICINE

## 2024-06-17 PROCEDURE — C1733 CATH, EP, OTHR THAN COOL-TIP: HCPCS | Performed by: INTERNAL MEDICINE

## 2024-06-17 PROCEDURE — 93325 DOPPLER ECHO COLOR FLOW MAPG: CPT | Mod: 26,,, | Performed by: INTERNAL MEDICINE

## 2024-06-17 PROCEDURE — D9220A PRA ANESTHESIA: Mod: ANES,,, | Performed by: ANESTHESIOLOGY

## 2024-06-17 PROCEDURE — 93005 ELECTROCARDIOGRAM TRACING: CPT | Mod: 59

## 2024-06-17 PROCEDURE — 93325 DOPPLER ECHO COLOR FLOW MAPG: CPT

## 2024-06-17 PROCEDURE — 37000009 HC ANESTHESIA EA ADD 15 MINS: Performed by: INTERNAL MEDICINE

## 2024-06-17 PROCEDURE — 93010 ELECTROCARDIOGRAM REPORT: CPT | Mod: 76,,, | Performed by: INTERNAL MEDICINE

## 2024-06-17 PROCEDURE — 93653 COMPRE EP EVAL TX SVT: CPT | Mod: ,,, | Performed by: INTERNAL MEDICINE

## 2024-06-17 PROCEDURE — 93312 ECHO TRANSESOPHAGEAL: CPT | Mod: 26,,, | Performed by: INTERNAL MEDICINE

## 2024-06-17 PROCEDURE — C1730 CATH, EP, 19 OR FEW ELECT: HCPCS | Performed by: INTERNAL MEDICINE

## 2024-06-17 PROCEDURE — 93653 COMPRE EP EVAL TX SVT: CPT | Performed by: INTERNAL MEDICINE

## 2024-06-17 PROCEDURE — D9220A PRA ANESTHESIA: Mod: CRNA,,, | Performed by: NURSE ANESTHETIST, CERTIFIED REGISTERED

## 2024-06-17 RX ORDER — PHENYLEPHRINE HYDROCHLORIDE 10 MG/ML
INJECTION INTRAVENOUS
Status: DISCONTINUED | OUTPATIENT
Start: 2024-06-17 | End: 2024-06-17

## 2024-06-17 RX ORDER — ONDANSETRON HYDROCHLORIDE 2 MG/ML
4 INJECTION, SOLUTION INTRAVENOUS ONCE AS NEEDED
Status: DISCONTINUED | OUTPATIENT
Start: 2024-06-17 | End: 2024-06-17 | Stop reason: HOSPADM

## 2024-06-17 RX ORDER — LIDOCAINE HYDROCHLORIDE 20 MG/ML
INJECTION INTRAVENOUS
Status: DISCONTINUED | OUTPATIENT
Start: 2024-06-17 | End: 2024-06-17

## 2024-06-17 RX ORDER — HYDROMORPHONE HYDROCHLORIDE 1 MG/ML
0.2 INJECTION, SOLUTION INTRAMUSCULAR; INTRAVENOUS; SUBCUTANEOUS EVERY 5 MIN PRN
Status: DISCONTINUED | OUTPATIENT
Start: 2024-06-17 | End: 2024-06-17 | Stop reason: HOSPADM

## 2024-06-17 RX ORDER — LIDOCAINE HYDROCHLORIDE 20 MG/ML
INJECTION, SOLUTION INFILTRATION; PERINEURAL
Status: DISCONTINUED | OUTPATIENT
Start: 2024-06-17 | End: 2024-06-17 | Stop reason: HOSPADM

## 2024-06-17 RX ORDER — FENTANYL CITRATE 50 UG/ML
25 INJECTION, SOLUTION INTRAMUSCULAR; INTRAVENOUS EVERY 5 MIN PRN
Status: DISCONTINUED | OUTPATIENT
Start: 2024-06-17 | End: 2024-06-17 | Stop reason: HOSPADM

## 2024-06-17 RX ORDER — DIPHENHYDRAMINE HYDROCHLORIDE 50 MG/ML
25 INJECTION INTRAMUSCULAR; INTRAVENOUS EVERY 6 HOURS PRN
Status: DISCONTINUED | OUTPATIENT
Start: 2024-06-17 | End: 2024-06-17 | Stop reason: HOSPADM

## 2024-06-17 RX ORDER — ACETAMINOPHEN 325 MG/1
650 TABLET ORAL EVERY 4 HOURS PRN
Status: DISCONTINUED | OUTPATIENT
Start: 2024-06-17 | End: 2024-06-17 | Stop reason: HOSPADM

## 2024-06-17 RX ORDER — ONDANSETRON HYDROCHLORIDE 2 MG/ML
INJECTION, SOLUTION INTRAVENOUS
Status: DISCONTINUED | OUTPATIENT
Start: 2024-06-17 | End: 2024-06-17

## 2024-06-17 RX ORDER — FENTANYL CITRATE 50 UG/ML
INJECTION, SOLUTION INTRAMUSCULAR; INTRAVENOUS
Status: DISCONTINUED | OUTPATIENT
Start: 2024-06-17 | End: 2024-06-17

## 2024-06-17 RX ORDER — PROPOFOL 10 MG/ML
VIAL (ML) INTRAVENOUS CONTINUOUS PRN
Status: DISCONTINUED | OUTPATIENT
Start: 2024-06-17 | End: 2024-06-17

## 2024-06-17 RX ORDER — HEPARIN SOD,PORCINE/0.9 % NACL 1000/500ML
INTRAVENOUS SOLUTION INTRAVENOUS
Status: DISCONTINUED | OUTPATIENT
Start: 2024-06-17 | End: 2024-06-17 | Stop reason: HOSPADM

## 2024-06-17 RX ADMIN — PHENYLEPHRINE HYDROCHLORIDE 100 MCG: 10 INJECTION INTRAVENOUS at 03:06

## 2024-06-17 RX ADMIN — FENTANYL CITRATE 25 MCG: 0.05 INJECTION, SOLUTION INTRAMUSCULAR; INTRAVENOUS at 03:06

## 2024-06-17 RX ADMIN — PROPOFOL 50 MCG/KG/MIN: 10 INJECTION, EMULSION INTRAVENOUS at 02:06

## 2024-06-17 RX ADMIN — ONDANSETRON 4 MG: 2 INJECTION INTRAMUSCULAR; INTRAVENOUS at 03:06

## 2024-06-17 RX ADMIN — SODIUM CHLORIDE: 0.9 INJECTION, SOLUTION INTRAVENOUS at 02:06

## 2024-06-17 RX ADMIN — LIDOCAINE HYDROCHLORIDE 50 MG: 20 INJECTION INTRAVENOUS at 02:06

## 2024-06-17 RX ADMIN — PROPOFOL 30 MG: 10 INJECTION, EMULSION INTRAVENOUS at 03:06

## 2024-06-17 RX ADMIN — FENTANYL CITRATE 25 MCG: 0.05 INJECTION, SOLUTION INTRAMUSCULAR; INTRAVENOUS at 02:06

## 2024-06-17 NOTE — Clinical Note
In the wait [facility] ED  [facility]  Phone: [phone]               November 16, 2020    Patient: Geovanna Turner   YOB: 2021   Date of Visit: 11/16/2020       To Whom It May Concern:    Geovanna Turner was seen and treated in our emergency department on 11/16/2020. She was accompanied by Jose David Perez.       Sincerely,       Marilyn Vergara MD         Signature:__________________________________

## 2024-06-17 NOTE — HPI
78 yoF who developed AFL 3/24 and was in it again 5/2/24. Normal EF. On OAC. EVent monitor with AFL. She has extensive bruising on OAC.     AC therapy: Eliquis 2.5mg bid, Toprol 100mg daily    ECG:AFL, rate controlled.

## 2024-06-17 NOTE — SUBJECTIVE & OBJECTIVE
Past Medical History:   Diagnosis Date    Acute hypoxemic respiratory failure 6/26/2021    Acute superficial venous thrombosis of lower extremity, bilateral 1/25/2022    Aortic atherosclerosis     Arthritis     knee joint pain    Asthma-COPD overlap syndrome 8/22/2022    Breast cancer 2002    left breast & lymph nodes-s/p sx with chemo    Bronchitis     with flu-2/2014    Cataracts, bilateral     Chronic anticoagulation 5/4/2022    Chronic diastolic heart failure 12/24/2019    Chronic kidney disease, stage 3     Class 1 obesity due to excess calories with serious comorbidity and body mass index (BMI) of 30.0 to 30.9 in adult 5/16/2024    History of chemotherapy     last treatment 12/2002 (had 8 treatments)    Hyperlipidemia 11/20/2016    Hypertension     Lymphedema of both lower extremities 1/25/2022    Nephrolithiasis 6/2/2014    Osteoporosis     Paroxysmal atrial fibrillation 6/7/2021    Renal calculi     hematuria    Renal osteodystrophy 1/14/2016    Venous stasis dermatitis of both lower extremities 1/25/2022    Vitamin D insufficiency        Past Surgical History:   Procedure Laterality Date    BREAST BIOPSY Left 2002    core bx, +    BREAST BIOPSY Right 2018    core    BREAST BIOPSY Right 2019    BREAST LUMPECTOMY Left 2002    CYSTOSCOPY  4/28/2022    Procedure: CYSTOSCOPY;  Surgeon: Vik Ware MD;  Location: [facility] OR [address] [place] [address];  Service: Urology;;    CYSTOSCOPY  5/30/2022    Procedure: CYSTOSCOPY;  Surgeon: Bonnie Knutson MD;  Location: [facility] OR [address] [place] [address];  Service: Urology;;    LASER LITHOTRIPSY  5/30/2022    Procedure: LITHOTRIPSY, USING LASER;  Surgeon: Bonnie Knutson MD;  Location: [facility] OR [address] [place] [address];  Service: Urology;;    LITHOTRIPSY      MASTECTOMY Left 06/2002    left-& lymph node dissection    REPLACEMENT OF STENT Right 5/30/2022    Procedure: REPLACEMENT, STENT;  Surgeon: Bonnie Knutson MD;  Location: [facility] OR [address] [place] [address];  Service: Urology;  Laterality: Right;    RETROGRADE  PYELOGRAPHY Right 4/28/2022    Procedure: PYELOGRAM, RETROGRADE;  Surgeon: Vik Ware MD;  Location: [facility] OR 1ST FLR;  Service: Urology;  Laterality: Right;    ROBOT-ASSISTED LAPAROSCOPIC PARTIAL NEPHRECTOMY USING DA JESÚS XI Right 3/11/2021    Procedure: XI ROBOTIC NEPHRECTOMY, PARTIAL;  Surgeon: Taz Ortega MD;  Location: [facility] OR 2ND FLR;  Service: Urology;  Laterality: Right;  4hr/ gen with regional  Fort confirmation:  [id number] for 11:15am case NC    URETEROSCOPIC REMOVAL OF URETERIC CALCULUS Right 5/30/2022    Procedure: REMOVAL, CALCULUS, URETER, URETEROSCOPIC;  Surgeon: Bonnie Knutson MD;  Location: [facility] OR 1ST FLR;  Service: Urology;  Laterality: Right;    ureteroscopy Bilateral     6.14    URETEROSCOPY Right 5/30/2022    Procedure: URETEROSCOPY;  Surgeon: Bonnie Knutson MD;  [facility];  Service: Urology;  Laterality: Right;       Review of patient's allergies indicates:   Allergen Reactions    Adhesive tape-silicones     Adhesive Rash     Pt states she removed a LATEX bandaid and the skin beneath was swollen and red. No other latex causes a reaction.       No current facility-administered medications on file prior to encounter.     Current Outpatient Medications on File Prior to Encounter   Medication Sig    acetaminophen (TYLENOL) 500 MG tablet Take 2 tablets (1,000 mg total) by mouth every 8 (eight) hours as needed for Pain.    albuterol (PROVENTIL HFA) 90 mcg/actuation inhaler Inhale 2 puffs into the lungs every 6 (six) hours as needed for Wheezing. Rescue    apixaban (ELIQUIS) 2.5 mg Tab Take 1 tablet (2.5 mg total) by mouth 2 (two) times a day.    aspirin (ECOTRIN) 81 MG EC tablet Take 81 mg by mouth once daily.    biotin 1 mg tablet Take 1,000 mcg by mouth once daily.    budesonide-glycopyr-formoterol (BREZTRI AEROSPHERE) 160-9-4.8 mcg/actuation HFAA Inhale 2 puffs into the lungs 2 (two) times daily.    busPIRone (BUSPAR) 5 MG Tab Take 5 mg by mouth  as needed.    fluticasone propionate (FLONASE) 50 mcg/actuation nasal spray 1 spray by Each Nostril route daily as needed (congestion).    inhalation spacing device Use as directed for inhalation.    metoprolol succinate (TOPROL-XL) 100 MG 24 hr tablet Take 1 tablet (100 mg total) by mouth once daily.    rosuvastatin (CRESTOR) 5 MG tablet Take 1 tablet (5 mg total) by mouth once daily.    tamsulosin (FLOMAX) 0.4 mg Cap Take 1 capsule (0.4 mg total) by mouth once daily.    vitamin D (VITAMIN D3) 1000 units Tab Take 2 tablets (2,000 Units total) by mouth once daily.     Family History       Problem Relation (Age of Onset)    Arthritis Mother, Sister    Bone cancer Mother    Breast cancer Mother, Sister    Cancer Father    Crohn's disease Daughter    Fibroids Daughter    No Known Problems Brother, Daughter          Tobacco Use    Smoking status: Former     Current packs/day: 0.00     Average packs/day: 1 pack/day for 50.0 years (50.0 ttl pk-yrs)     Types: Cigarettes     Start date: 1960     Quit date: 5/20/2009     Years since quitting: 15.0    Smokeless tobacco: Never   Substance and Sexual Activity    Alcohol use: No    Drug use: No    Sexual activity: Not Currently     Partners: Male     Birth control/protection: Partner-Vasectomy     Review of Systems   All other systems reviewed and are negative.    Objective:     Vital Signs (Most Recent):    Vital Signs (24h Range):  BP: ()/()   Arterial Line BP: ()/()           There is no height or weight on file to calculate BMI.             Physical Exam  Vitals and nursing note reviewed.   Constitutional:       Appearance: Normal appearance.   Cardiovascular:      Rate and Rhythm: Normal rate and regular rhythm.   Pulmonary:      Effort: Pulmonary effort is normal.   Musculoskeletal:      Right lower leg: No edema.      Left lower leg: No edema.   Skin:     General: Skin is warm.   Neurological:      General: No focal deficit present.      Mental Status: She is alert.             Significant Labs: All pertinent lab results from the last 24 hours have been reviewed.

## 2024-06-17 NOTE — TRANSFER OF CARE
"Anesthesia Transfer of Care Note    Patient: Misa Barajas    Procedure(s) Performed: Procedure(s) (LRB):  Ablation, Atrial Flutter, Typical (N/A)  Transesophageal echo (MAKAYLA) intra-procedure log documentation (N/A)    Patient location: PACU    Anesthesia Type: general    Transport from OR: Transported from OR on 6-10 L/min O2 by face mask with adequate spontaneous ventilation    Post pain: adequate analgesia    Post assessment: no apparent anesthetic complications and tolerated procedure well    Post vital signs: stable    Level of consciousness: awake and alert    Nausea/Vomiting: no nausea/vomiting    Complications: none    Transfer of care protocol was followed      Last vitals: Visit Vitals  /61 (BP Location: Left arm, Patient Position: Lying)   Pulse 74   Temp 36.3 °C (97.4 °F) (Temporal)   Resp 19   Ht 5' 4" (1.626 m)   Wt 75.3 kg (166 lb)   SpO2 100%   Breastfeeding No   BMI 28.49 kg/m²     "

## 2024-06-17 NOTE — HOSPITAL COURSE
Patient underwent successful RFA of the CTI for treatment of CTI-dependent atrial flutter. No evidence of intra- or post-procedure complications. Post-ablation ECG shows NSR, and no acute abnormalities.     EP medications at discharge:  Antiarrhythmics and/or AVN agents: unchanged.   Patient was instructed to continue their oral anticoagulation as previously prescribed. Continue Eliquis 2.5mg bid given borderline creatinine and bruising  Patient was instructed to take ibuprofen 800 mg TID x 3 days for pericarditis.     Groin access sites without hematoma or bleeding. Activity restrictions given to patient. Instructed to seek medical attention for shortness of breath, chest discomfort not alleviated with NSAIDs, bleeding/hematoma formation at the access sites, acute onset of neurologic symptoms, N/V, or hematemesis. At discharge the patient denied CP, SOB, access site bleeding/hematoma, or any other complaints or evidence of complications.    ----------------------------------------------------------------------------------------

## 2024-06-17 NOTE — ASSESSMENT & PLAN NOTE
78 yoF here for CTI RFA. She has symptomatic AFL with bruising on OAC. Offered ablation for definitive management. Extensive discussion regarding risks, benefits, and alternative approaches to the procedure was had with the patient.  The patient voices understanding and all questions have been answered.  The patient would like to proceed as planned. Consents signed.        AFL ablation  MAKAYLA  LORENE  Last OAC night prior     Of note, AF has been reported on various studies. AFL is her predominant arrhythmia. I cannot exclude AF, but I can confirm AFL.

## 2024-06-17 NOTE — H&P
[facility] - Short Stay Cardiac Unit  Cardiac Electrophysiology  History and Physical     Admission Date: 6/17/2024  Code Status: Prior   Attending Provider: Taz Church MD   Principal Problem:Typical atrial flutter    Subjective:     Chief Complaint:  AFL     HPI:  78 yoF who developed AFL 3/24 and was in it again 5/2/24. Normal EF. On OAC. EVent monitor with AFL. She has extensive bruising on OAC.     AC therapy: Eliquis 2.5mg bid, Toprol 100mg daily    ECG:AFL, rate controlled.    Past Medical History:   Diagnosis Date    Acute hypoxemic respiratory failure 6/26/2021    Acute superficial venous thrombosis of lower extremity, bilateral 1/25/2022    Aortic atherosclerosis     Arthritis     knee joint pain    Asthma-COPD overlap syndrome 8/22/2022    Breast cancer 2002    left breast & lymph nodes-s/p sx with chemo    Bronchitis     with flu-2/2014    Cataracts, bilateral     Chronic anticoagulation 5/4/2022    Chronic diastolic heart failure 12/24/2019    Chronic kidney disease, stage 3     Class 1 obesity due to excess calories with serious comorbidity and body mass index (BMI) of 30.0 to 30.9 in adult 5/16/2024    History of chemotherapy     last treatment 12/2002 (had 8 treatments)    Hyperlipidemia 11/20/2016    Hypertension     Lymphedema of both lower extremities 1/25/2022    Nephrolithiasis 6/2/2014    Osteoporosis     Paroxysmal atrial fibrillation 6/7/2021    Renal calculi     hematuria    Renal osteodystrophy 1/14/2016    Venous stasis dermatitis of both lower extremities 1/25/2022    Vitamin D insufficiency        Past Surgical History:   Procedure Laterality Date    BREAST BIOPSY Left 2002    core bx, +    BREAST BIOPSY Right 2018    core    BREAST BIOPSY Right 2019    BREAST LUMPECTOMY Left 2002    CYSTOSCOPY  4/28/2022    Procedure: CYSTOSCOPY;  Surgeon: Vik Ware MD;  Location: [facility] OR [address] [place] [address];  Service: Urology;;    CYSTOSCOPY  5/30/2022    Procedure: CYSTOSCOPY;  Surgeon: Bonnie  JORJE Knutson MD;  [facility];  Service: Urology;;    LASER LITHOTRIPSY  5/30/2022    Procedure: LITHOTRIPSY, USING LASER;  Surgeon: Bonnie Knutson MD;  [facility];  Service: Urology;;    LITHOTRIPSY      MASTECTOMY Left 06/2002    left-& lymph node dissection    REPLACEMENT OF STENT Right 5/30/2022    Procedure: REPLACEMENT, STENT;  Surgeon: Bonnie Knutson MD;  [facility];  Service: Urology;  Laterality: Right;    RETROGRADE PYELOGRAPHY Right 4/28/2022    Procedure: PYELOGRAM, RETROGRADE;  Surgeon: Vik Ware MD;  [facility];  Service: Urology;  Laterality: Right;    ROBOT-ASSISTED LAPAROSCOPIC PARTIAL NEPHRECTOMY USING DA JESÚS XI Right 3/11/2021    Procedure: XI ROBOTIC NEPHRECTOMY, PARTIAL;  Surgeon: Taz Ortega MD;  Location: [facility] OR 2ND FLR;  Service: Urology;  Laterality: Right;  4hr/ gen with regional  Fort confirmation:  [id number] for 11:15am case NC    URETEROSCOPIC REMOVAL OF URETERIC CALCULUS Right 5/30/2022    Procedure: REMOVAL, CALCULUS, URETER, URETEROSCOPIC;  Surgeon: Bonnie Knutson MD;  Location: [facility] OR [address] [place] [address];  Service: Urology;  Laterality: Right;    ureteroscopy Bilateral     6.14    URETEROSCOPY Right 5/30/2022    Procedure: URETEROSCOPY;  Surgeon: Bonnie Knutson MD;  Location: [facility] OR [address] [place] [address];  Service: Urology;  Laterality: Right;       Review of patient's allergies indicates:   Allergen Reactions    Adhesive tape-silicones     Adhesive Rash     Pt states she removed a LATEX bandaid and the skin beneath was swollen and red. No other latex causes a reaction.       No current facility-administered medications on file prior to encounter.     Current Outpatient Medications on File Prior to Encounter   Medication Sig    acetaminophen (TYLENOL) 500 MG tablet Take 2 tablets (1,000 mg total) by mouth every 8 (eight) hours as needed for Pain.    albuterol (PROVENTIL HFA) 90 mcg/actuation  inhaler Inhale 2 puffs into the lungs every 6 (six) hours as needed for Wheezing. Rescue    apixaban (ELIQUIS) 2.5 mg Tab Take 1 tablet (2.5 mg total) by mouth 2 (two) times a day.    aspirin (ECOTRIN) 81 MG EC tablet Take 81 mg by mouth once daily.    biotin 1 mg tablet Take 1,000 mcg by mouth once daily.    budesonide-glycopyr-formoterol (BREZTRI AEROSPHERE) 160-9-4.8 mcg/actuation HFAA Inhale 2 puffs into the lungs 2 (two) times daily.    busPIRone (BUSPAR) 5 MG Tab Take 5 mg by mouth as needed.    fluticasone propionate (FLONASE) 50 mcg/actuation nasal spray 1 spray by Each Nostril route daily as needed (congestion).    inhalation spacing device Use as directed for inhalation.    metoprolol succinate (TOPROL-XL) 100 MG 24 hr tablet Take 1 tablet (100 mg total) by mouth once daily.    rosuvastatin (CRESTOR) 5 MG tablet Take 1 tablet (5 mg total) by mouth once daily.    tamsulosin (FLOMAX) 0.4 mg Cap Take 1 capsule (0.4 mg total) by mouth once daily.    vitamin D (VITAMIN D3) 1000 units Tab Take 2 tablets (2,000 Units total) by mouth once daily.     Family History       Problem Relation (Age of Onset)    Arthritis Mother, Sister    Bone cancer Mother    Breast cancer Mother, Sister    Cancer Father    Crohn's disease Daughter    Fibroids Daughter    No Known Problems Brother, Daughter          Tobacco Use    Smoking status: Former     Current packs/day: 0.00     Average packs/day: 1 pack/day for 50.0 years (50.0 ttl pk-yrs)     Types: Cigarettes     Start date: 1960     Quit date: 5/20/2009     Years since quitting: 15.0    Smokeless tobacco: Never   Substance and Sexual Activity    Alcohol use: No    Drug use: No    Sexual activity: Not Currently     Partners: Male     Birth control/protection: Partner-Vasectomy     Review of Systems   All other systems reviewed and are negative.    Objective:     Vital Signs (Most Recent):    Vital Signs (24h Range):  BP: ()/()   Arterial Line BP: ()/()           There is no  height or weight on file to calculate BMI.             Physical Exam  Vitals and nursing note reviewed.   Constitutional:       Appearance: Normal appearance.   Cardiovascular:      Rate and Rhythm: Normal rate and regular rhythm.   Pulmonary:      Effort: Pulmonary effort is normal.   Musculoskeletal:      Right lower leg: No edema.      Left lower leg: No edema.   Skin:     General: Skin is warm.   Neurological:      General: No focal deficit present.      Mental Status: She is alert.            Significant Labs: All pertinent lab results from the last 24 hours have been reviewed.    Assessment and Plan:     * Typical atrial flutter  78 yoF here for CTI RFA. She has symptomatic AFL with bruising on OAC. Offered ablation for definitive management. Extensive discussion regarding risks, benefits, and alternative approaches to the procedure was had with the patient.  The patient voices understanding and all questions have been answered.  The patient would like to proceed as planned. Consents signed.        AFL ablation  MAKAYLA  LORENE  Last OAC night prior     Of note, AF has been reported on various studies. AFL is her predominant arrhythmia. I cannot exclude AF, but I can confirm AFL.            Natalia Ford MD  Cardiac Electrophysiology  [facility] - Short Stay Cardiac Unit

## 2024-06-17 NOTE — BRIEF OP NOTE
Attending: Taz Church MD  Date of Procedure: 06/17/24    Post-operative Diagnosis: CTI-dependent atrial flutter    Procedure Performed: Radiofrequency ablation of the CTI.     Description of Procedure: The patient was brought to the EP lab in the fasting state. Prepped and draped in sterile fashion. Safety timeout was performed. Sedation administered by anesthesia staff. Ultrasound guided venous access of the right femoral vein was performed x3. HALO, CS, and ablation catheters placed. RFA to the CTI with confirmation of bidirectional block.     EBL: <10 mL    Specimens: none  Complications: no immediate    Plan:  Bedrest x 4 hours  ECG on upon arrival to PACU  Resume oral anticoagulation following bedrest if there is no evidence of access site bleeding or hematoma  Medication changes: none  Plan for discharge following bedrest if patient tolerating PO intake, voiding, and ambulatory without evidence of complications     The attending physician was present for entire duration of the procedure    Natalia Ford MD, PGY7  Electrophysiology

## 2024-06-17 NOTE — DISCHARGE SUMMARY
[facility]  Cardiac Electrophysiology  Discharge Summary      Patient Name: Misa Barajas  MRN: [id number]  Admission Date: 6/17/2024  Hospital Length of Stay: 0 days  Discharge Date and Time:  06/17/2024 4:44 PM  Attending Physician: Taz Church MD    Discharging Provider: Natalia Ford MD  Primary Care Physician: Celia Carlisle MD    HPI:   78 yoF who developed AFL 3/24 and was in it again 5/2/24. Normal EF. On OAC. EVent monitor with AFL. She has extensive bruising on OAC.     AC therapy: Eliquis 2.5mg bid, Toprol 100mg daily    ECG:AFL, rate controlled.    Procedure(s) (LRB):  Ablation, Atrial Flutter, Typical (N/A)  Transesophageal echo (MAKAYLA) intra-procedure log documentation (N/A)     Indwelling Lines/Drains at time of discharge:  Lines/Drains/Airways       None                   Hospital Course:  Patient underwent successful RFA of the CTI for treatment of CTI-dependent atrial flutter. No evidence of intra- or post-procedure complications. Post-ablation ECG shows NSR, and no acute abnormalities.     EP medications at discharge:  Antiarrhythmics and/or AVN agents: unchanged.   Patient was instructed to continue their oral anticoagulation as previously prescribed. Continue Eliquis 2.5mg bid given borderline creatinine and bruising  Patient was instructed to take ibuprofen 800 mg TID x 3 days for pericarditis.     Groin access sites without hematoma or bleeding. Activity restrictions given to patient. Instructed to seek medical attention for shortness of breath, chest discomfort not alleviated with NSAIDs, bleeding/hematoma formation at the access sites, acute onset of neurologic symptoms, N/V, or hematemesis. At discharge the patient denied CP, SOB, access site bleeding/hematoma, or any other complaints or evidence of complications.    ----------------------------------------------------------------------------------------    Goals of Care Treatment Preferences:  Code Status:  Full Code      Consults:     Significant Diagnostic Studies: N/A    Pending Diagnostic Studies:       Procedure Component Value Units Date/Time    EKG 12-lead [[id number]]     Order Status: Sent Lab Status: No result             Final Active Diagnoses:    Diagnosis Date Noted POA    PRINCIPAL PROBLEM:  Typical atrial flutter [I48.3] 05/28/2024 Yes      Problems Resolved During this Admission:     No new Assessment & Plan notes have been filed under this hospital service since the last note was generated.  Service: Arrhythmia      Discharged Condition: stable    Disposition:     Follow Up:    Patient Instructions:   No discharge procedures on file.  Medications:  Reconciled Home Medications:      Medication List        CONTINUE taking these medications      acetaminophen 500 MG tablet  Commonly known as: TYLENOL  Take 2 tablets (1,000 mg total) by mouth every 8 (eight) hours as needed for Pain.     albuterol 90 mcg/actuation inhaler  Commonly known as: PROVENTIL HFA  Inhale 2 puffs into the lungs every 6 (six) hours as needed for Wheezing. Rescue     aspirin 81 MG EC tablet  Commonly known as: ECOTRIN  Take 81 mg by mouth once daily.     biotin 1 mg tablet  Take 1,000 mcg by mouth once daily.     BREZTRI AEROSPHERE 160-9-4.8 mcg/actuation Hfaa  Generic drug: budesonide-glycopyr-formoterol  Inhale 2 puffs into the lungs 2 (two) times daily.     busPIRone 5 MG Tab  Commonly known as: BUSPAR  Take 5 mg by mouth as needed.     ELIQUIS 2.5 mg Tab  Generic drug: apixaban  Take 1 tablet (2.5 mg total) by mouth 2 (two) times a day.     fluticasone propionate 50 mcg/actuation nasal spray  Commonly known as: FLONASE  1 spray by Each Nostril route daily as needed (congestion).     furosemide 40 MG tablet  Commonly known as: LASIX  Take 1 tablet (40 mg total) by mouth 2 (two) times daily.     inhalation spacing device  Use as directed for inhalation.     metoprolol succinate 100 MG 24 hr tablet  Commonly known as:  TOPROL-XL  Take 1 tablet (100 mg total) by mouth once daily.     rosuvastatin 5 MG tablet  Commonly known as: CRESTOR  Take 1 tablet (5 mg total) by mouth once daily.     vitamin D 1000 units Tab  Commonly known as: VITAMIN D3  Take 2 tablets (2,000 Units total) by mouth once daily.            ASK your doctor about these medications      tamsulosin 0.4 mg Cap  Commonly known as: FLOMAX  Take 1 capsule (0.4 mg total) by mouth once daily.              Time spent on the discharge of patient: 45 minutes    Natalia Ford MD  Cardiac Electrophysiology  [facility] - Cardiology

## 2024-06-17 NOTE — ANESTHESIA PROCEDURE NOTES
Intubation    Date/Time: 6/17/2024 3:11 PM    Performed by: Nevin Sky CRNA  Authorized by: Baltazar Cavazos MD    Intubation:     Induction:  Intravenous    Intubated:  Postinduction    Mask Ventilation:  Easy mask    Attempts:  1    Attempted By:  CRNA    Difficult Airway Encountered?: No      Complications:  None    Airway Device:  Supraglottic airway/LMA    Airway Device Size:  4.0    Secured at:  The lips    Placement Verified By:  Capnometry    Complicating Factors:  None    Findings Post-Intubation:  BS equal bilateral and atraumatic/condition of teeth unchanged

## 2024-06-18 LAB
OHS QRS DURATION: 160 MS
OHS QTC CALCULATION: 500 MS

## 2024-06-18 NOTE — ANESTHESIA POSTPROCEDURE EVALUATION
Anesthesia Post Evaluation    Patient: Misa Barajas    Procedure(s) Performed: Procedure(s) (LRB):  Ablation, Atrial Flutter, Typical (N/A)  Transesophageal echo (MAKAYLA) intra-procedure log documentation (N/A)    Final Anesthesia Type: general      Patient location during evaluation: PACU  Patient participation: Yes- Able to Participate  Level of consciousness: awake and alert  Post-procedure vital signs: reviewed and stable  Pain management: adequate  Airway patency: patent    PONV status at discharge: No PONV  Anesthetic complications: no      Cardiovascular status: blood pressure returned to baseline  Respiratory status: unassisted, spontaneous ventilation and room air  Hydration status: euvolemic                Vitals Value Taken Time   /65 06/17/24 1932   Temp 36.4 °C (97.6 °F) 06/17/24 1900   Pulse 78 06/17/24 1944   Resp 25 06/17/24 1944   SpO2 98 % 06/17/24 1944   Vitals shown include unfiled device data.      No case tracking events are documented in the log.      Pain/Neeraj Score: Neeraj Score: 9 (6/17/2024  5:07 PM)

## 2024-06-18 NOTE — PROGRESS NOTES
Pt sitting on BS - Daughters present - discharge instructions given -pt/daughters X2  - all verbalizing understanding.

## 2024-06-19 ENCOUNTER — PATIENT MESSAGE (OUTPATIENT)
Dept: ELECTROPHYSIOLOGY | Facility: CLINIC | Age: 79
End: 2024-06-19
Payer: MEDICARE

## 2024-06-19 ENCOUNTER — TELEPHONE (OUTPATIENT)
Dept: ELECTROPHYSIOLOGY | Facility: CLINIC | Age: 79
End: 2024-06-19
Payer: MEDICARE

## 2024-06-19 ENCOUNTER — DOCUMENT SCAN (OUTPATIENT)
Dept: HOME HEALTH SERVICES | Facility: HOSPITAL | Age: 79
End: 2024-06-19

## 2024-06-19 ENCOUNTER — DOCUMENT SCAN (OUTPATIENT)
Dept: HOME HEALTH SERVICES | Facility: HOSPITAL | Age: 79
End: 2024-06-19
Payer: MEDICARE

## 2024-06-19 ENCOUNTER — OFFICE VISIT (OUTPATIENT)
Dept: PULMONOLOGY | Facility: CLINIC | Age: 79
End: 2024-06-19
Payer: MEDICARE

## 2024-06-19 VITALS — DIASTOLIC BLOOD PRESSURE: 63 MMHG | HEART RATE: 72 BPM | SYSTOLIC BLOOD PRESSURE: 96 MMHG | OXYGEN SATURATION: 93 %

## 2024-06-19 DIAGNOSIS — Z87.891 FORMER HEAVY TOBACCO SMOKER: ICD-10-CM

## 2024-06-19 DIAGNOSIS — J96.11 CHRONIC RESPIRATORY FAILURE WITH HYPOXIA: Primary | ICD-10-CM

## 2024-06-19 DIAGNOSIS — J44.89 ASTHMA-COPD OVERLAP SYNDROME: ICD-10-CM

## 2024-06-19 PROCEDURE — 99999 PR PBB SHADOW E&M-EST. PATIENT-LVL III: CPT | Mod: PBBFAC,,, | Performed by: INTERNAL MEDICINE

## 2024-06-19 NOTE — PROGRESS NOTES
Subjective:       Patient ID: Misa Barajas is a 78 y.o. female.  The patient's last visit with me was on Visit date not found.     Last seen in Pulm clinic in Feb 2022.    Had issues with REED in late 2023 seemed to be related to volume overload. Now with better controlled CHF and CV out of Aflutter with further improvement.    Compliant with Breztri BID with good inhaler technique. Rare albuterol use.      Review of Systems    Objective:      Vitals:    06/19/24 1442   BP: 96/63   Pulse: 72     Wt Readings from Last 3 Encounters:   06/17/24 75.3 kg (166 lb)   06/17/24 75.3 kg (166 lb)   05/30/24 77.8 kg (171 lb 8.3 oz)     Temp Readings from Last 3 Encounters:   06/17/24 97.8 °F (36.6 °C) (Temporal)   05/22/24 97.4 °F (36.3 °C) (Oral)   03/12/24 97.6 °F (36.4 °C)     BP Readings from Last 3 Encounters:   06/19/24 96/63   06/17/24 128/64   06/17/24 126/73     Pulse Readings from Last 3 Encounters:   06/19/24 72   06/17/24 77   06/17/24 78       Physical Exam   Constitutional: She appears well-developed and well-nourished.   Pulmonary/Chest: Breath sounds normal.   Vitals reviewed.      CBC  Lab Results   Component Value Date    WBC 8.00 06/12/2024    HGB 11.8 (L) 06/12/2024    HCT 36.7 (L) 06/12/2024    MCV 92 06/12/2024     06/12/2024         CMP  Sodium   Date Value Ref Range Status   06/12/2024 139 136 - 145 mmol/L Final     Potassium   Date Value Ref Range Status   06/12/2024 3.7 3.5 - 5.1 mmol/L Final     Chloride   Date Value Ref Range Status   06/12/2024 104 95 - 110 mmol/L Final     CO2   Date Value Ref Range Status   06/12/2024 26 23 - 29 mmol/L Final     Glucose   Date Value Ref Range Status   06/12/2024 107 70 - 110 mg/dL Final     BUN   Date Value Ref Range Status   06/12/2024 19 8 - 23 mg/dL Final     Creatinine   Date Value Ref Range Status   06/12/2024 1.4 0.5 - 1.4 mg/dL Final     Calcium   Date Value Ref Range Status   06/12/2024 10.3 8.7 - 10.5 mg/dL Final     Total Protein   Date Value  Ref Range Status   05/21/2024 5.5 (L) 6.0 - 8.4 g/dL Final     Albumin   Date Value Ref Range Status   05/21/2024 2.4 (L) 3.5 - 5.2 g/dL Final     Total Bilirubin   Date Value Ref Range Status   05/21/2024 0.4 0.1 - 1.0 mg/dL Final     Comment:     For infants and newborns, interpretation of results should be based  on gestational age, weight and in agreement with clinical  observations.    Premature Infant recommended reference ranges:  Up to 24 hours.............<8.0 mg/dL  Up to 48 hours............<12.0 mg/dL  3-5 days..................<15.0 mg/dL  6-29 days.................<15.0 mg/dL       Alkaline Phosphatase   Date Value Ref Range Status   05/21/2024 102 55 - 135 U/L Final     AST   Date Value Ref Range Status   05/21/2024 33 10 - 40 U/L Final     ALT   Date Value Ref Range Status   05/21/2024 20 10 - 44 U/L Final     Anion Gap   Date Value Ref Range Status   06/12/2024 9 8 - 16 mmol/L Final     eGFR   Date Value Ref Range Status   06/12/2024 38.0 (A) >60 mL/min/1.73 m^2 Final       ABG  POC PH   Date Value Ref Range Status   03/01/2024 7.361 7.35 - 7.45 Final     POC PO2   Date Value Ref Range Status   03/01/2024 15 (LL) 40 - 60 mmHg Final     POC PCO2   Date Value Ref Range Status   03/01/2024 50.7 (H) 35 - 45 mmHg Final           Personal Diagnostic Review  I have personally reviewed the following data and added my own interpretation as below:  CT Chest 6/28/21 images personally reviewed and shows moderate emphysema, small effusions and signs of volume overload  Cardiology notes reviewed  PCP notes reviewed  Cr 1.4      6/19/2024     2:42 PM 6/17/2024     8:30 PM 6/17/2024     8:15 PM 6/17/2024     8:00 PM 6/17/2024     7:45 PM 6/17/2024     7:30 PM 6/17/2024     7:15 PM   Pulmonary Function Tests   SpO2 93 % 98 % 96 % 98 % 98 % 98 % 98 %         Assessment:       1. Chronic respiratory failure with hypoxia    2. Asthma-COPD overlap syndrome Chronic   3. Former heavy tobacco smoker        Outpatient  Encounter Medications as of 6/19/2024   Medication Sig Dispense Refill    acetaminophen (TYLENOL) 500 MG tablet Take 2 tablets (1,000 mg total) by mouth every 8 (eight) hours as needed for Pain. 42 tablet 0    albuterol (PROVENTIL HFA) 90 mcg/actuation inhaler Inhale 2 puffs into the lungs every 6 (six) hours as needed for Wheezing. Rescue 18 g 3    apixaban (ELIQUIS) 2.5 mg Tab Take 1 tablet (2.5 mg total) by mouth 2 (two) times a day. 60 tablet 11    aspirin (ECOTRIN) 81 MG EC tablet Take 81 mg by mouth once daily.      biotin 1 mg tablet Take 1,000 mcg by mouth once daily.      budesonide-glycopyr-formoterol (BREZTRI AEROSPHERE) 160-9-4.8 mcg/actuation HFAA Inhale 2 puffs into the lungs 2 (two) times daily. 32.1 g 3    busPIRone (BUSPAR) 5 MG Tab Take 5 mg by mouth as needed.      fluticasone propionate (FLONASE) 50 mcg/actuation nasal spray 1 spray by Each Nostril route daily as needed (congestion).      furosemide (LASIX) 40 MG tablet Take 1 tablet (40 mg total) by mouth 2 (two) times daily. 180 tablet 3    inhalation spacing device Use as directed for inhalation. 1 Device 0    metoprolol succinate (TOPROL-XL) 100 MG 24 hr tablet Take 1 tablet (100 mg total) by mouth once daily. 90 tablet 3    rosuvastatin (CRESTOR) 5 MG tablet Take 1 tablet (5 mg total) by mouth once daily. 90 tablet 3    tamsulosin (FLOMAX) 0.4 mg Cap Take 1 capsule (0.4 mg total) by mouth once daily. 30 capsule 11    vitamin D (VITAMIN D3) 1000 units Tab Take 2 tablets (2,000 Units total) by mouth once daily. 180 tablet 3     No facility-administered encounter medications on file as of 6/19/2024.     1. Asthma-COPD overlap syndrome  - Ambulatory referral/consult to Pulmonology  - Stress test, pulmonary; Future  - Complete PFT with bronchodilator; Future    2. Chronic respiratory failure with hypoxia    3. Former heavy tobacco smoker    Plan:     Problem List Items Addressed This Visit          Pulmonary    Chronic respiratory failure with  hypoxia - Primary    Current Assessment & Plan     Significant chronic condition to be followed longitudinally with following long term treatment plan.     Increased O2 needs after hospitalization. Suspect progression of underlying COPD and CHF. Repeat 6MWD 1 month after home and suspect needs POC.         Asthma-COPD overlap syndrome    Overview     Continue current medication. F/u with pulmonology         Current Assessment & Plan     Doing well on Breztri inhaler BID. Continue triple therapy. PRN albuterol use.         Relevant Orders    Stress test, pulmonary    Complete PFT with bronchodilator       Other    Former heavy tobacco smoker    Current Assessment & Plan     Quit over 20 years ago. Last CT 2021 without nodules. Outside lung cancer screening windows          Plan for pulmonary rehab once her home PT/OT is completed    Please note Overview Notes are historic documentation. Please review A/P for current updates.  No follow-ups on file.    Future Appointments   Date Time Provider Department Center   6/26/2024  9:30 AM [facility] UROLOGY PROCEDURE 2 [facility]RO [place]   7/10/2024  2:40 PM Valerio Andino MD [facility] CARDIO [facility]   7/17/2024  2:00 PM PULMONARY LAB [facility] PULLAB [place]   7/17/2024  2:30 PM PULMONARY LAB [facility] PULLAB [place]   9/6/2024 10:15 AM LAB, [facility] [place]   9/12/2024 11:00 AM Clemencia Boo MD [facility] MED [place]   9/19/2024  1:00 PM Ibeth Christy MD [facility] NEPHRO [facility]   9/24/2024  8:00 AM EKG, APPT [facility] EKG [facility]   9/24/2024  8:20 AM Taz Church MD [facility] ARRHYTH [facility] Qiu MD

## 2024-06-19 NOTE — ASSESSMENT & PLAN NOTE
Significant chronic condition to be followed longitudinally with following long term treatment plan.     Increased O2 needs after hospitalization. Suspect progression of underlying COPD and CHF. Repeat 6MWD 1 month after home and suspect needs POC.

## 2024-06-19 NOTE — TELEPHONE ENCOUNTER
----- Message from Bharat Berman MA sent at 6/18/2024  4:20 PM CDT -----  Regarding: FW: Fu    ----- Message -----  From: Deisy Bradshaw  Sent: 6/18/2024   8:23 AM CDT  To: [facility]jit Mcg[facility] Staff  Subject:                                                Patient calling to schedule a procedure f/u appt. Please call back @ [phone]. Thank you Deisy

## 2024-06-20 ENCOUNTER — DOCUMENT SCAN (OUTPATIENT)
Dept: HOME HEALTH SERVICES | Facility: HOSPITAL | Age: 79
End: 2024-06-20
Payer: MEDICARE

## 2024-06-20 ENCOUNTER — PATIENT MESSAGE (OUTPATIENT)
Dept: PULMONOLOGY | Facility: CLINIC | Age: 79
End: 2024-06-20
Payer: MEDICARE

## 2024-06-25 ENCOUNTER — DOCUMENT SCAN (OUTPATIENT)
Dept: HOME HEALTH SERVICES | Facility: HOSPITAL | Age: 79
End: 2024-06-25
Payer: MEDICARE

## 2024-06-26 ENCOUNTER — PROCEDURE VISIT (OUTPATIENT)
Dept: UROLOGY | Facility: CLINIC | Age: 79
End: 2024-06-26
Payer: MEDICARE

## 2024-06-26 ENCOUNTER — DOCUMENT SCAN (OUTPATIENT)
Dept: HOME HEALTH SERVICES | Facility: HOSPITAL | Age: 79
End: 2024-06-26
Payer: MEDICARE

## 2024-06-26 VITALS
SYSTOLIC BLOOD PRESSURE: 104 MMHG | BODY MASS INDEX: 28.91 KG/M2 | RESPIRATION RATE: 19 BRPM | DIASTOLIC BLOOD PRESSURE: 66 MMHG | WEIGHT: 168.38 LBS | TEMPERATURE: 98 F | HEART RATE: 73 BPM

## 2024-06-26 DIAGNOSIS — N39.0 RECURRENT UTI: ICD-10-CM

## 2024-06-26 PROCEDURE — 52000 CYSTOURETHROSCOPY: CPT | Mod: S$GLB,,, | Performed by: UROLOGY

## 2024-06-26 RX ORDER — LIDOCAINE HYDROCHLORIDE 20 MG/ML
JELLY TOPICAL
Status: COMPLETED | OUTPATIENT
Start: 2024-06-26 | End: 2024-06-26

## 2024-06-26 RX ADMIN — LIDOCAINE HYDROCHLORIDE: 20 JELLY TOPICAL at 08:06

## 2024-06-26 NOTE — PROCEDURES
Office Cystourethroscopy Note    Date: 6/26/2024   Referring Provider: Ana Pinzon NP     Reason for Cystoscopy: Recurrent UTI    Upper Tract Evaluation:   Renal U/S: Normal upper urinary tract; possible non-obstructing urolithiasis    Procedure Details: Informed consent was obtained and she was sterily prepped and 1% lidocaine jelly was injected per urethra. A flexible cystoscope was inserted into the bladder via the urethra. There was no evidence of stricture, stenosis, lesions, other obstruction, or diverticulum. Significant findings included a normal unobstructed urethra only. Cystoscopic examination of the bladder revealed orthotopically positioned, normal bilateral ureteral orifices with clear yellow urine effluxing from each orifice. All mucosal surfaces were examined with no apparent stones, tumors, foreign bodies, erythema, trabeculation, diverticula, or ulcers. The procedure was concluded without complications. The patient was not administered a post-procedure antibiotic.     Specimens: No Specimens    Findings: Normal bladder and urethra    Other Findings:  PVR - 50 mL    Pelvic Exam:  Vaginal Mucosa: mild atrophy  Anterior: grade 1  Apical: 50-75% apical descent  Posterior: none  Urethral Lesions: no lesions or masses     Assesment and Plan:   Recurrent Urinary Tract Infections: No primary bladder pathology identified today to explain her recurrent urinary tract infections. We have recommended continued treatment discussed at last clinic visit. Continue D-mannose. May want to add RepHresh until can discuss topical vaginal estrogen with oncology. 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 Would check PVR on return visit (no evidence of retention today) but otherwise doubt that POP is contributing to her infections. Not clear today she was taking D-mannose as recommended. Gave another D-mannose handout. Return in 3-4 months. No  urologic intervention recommended.

## 2024-06-26 NOTE — PATIENT INSTRUCTIONS
What to Expect After a Cystoscopy  For the next 24-48 hours, you may feel a mild burning when you urinate. This burning is normal and expected. Drink plenty of water to dilute the urine to help relieve the burning sensation. You may also see a small amount of blood in your urine after the procedure.    Unless you are already taking antibiotics, you may be given an antibiotic after the test to prevent infection.    Signs and Symptoms to Report  Call the [facility] at [phone] if you develop any of the following:  Fever of 101 degrees or higher  Chills or persistent bleeding  Inability to urinate

## 2024-07-08 ENCOUNTER — HOSPITAL ENCOUNTER (OUTPATIENT)
Dept: PULMONOLOGY | Facility: HOSPITAL | Age: 79
Discharge: HOME | End: 2024-07-08
Attending: INTERNAL MEDICINE
Payer: MEDICARE

## 2024-07-08 VITALS — WEIGHT: 170 LBS | HEIGHT: 63 IN | BODY MASS INDEX: 30.12 KG/M2

## 2024-07-08 DIAGNOSIS — J44.89 ASTHMA-COPD OVERLAP SYNDROME: ICD-10-CM

## 2024-07-08 LAB
DLCO ADJ PRE: 4.94 ML/(MIN*MMHG) (ref 13.47–24.93)
DLCO SINGLE BREATH LLN: 13.47
DLCO SINGLE BREATH PRE REF: 24.4 %
DLCO SINGLE BREATH REF: 19.2
DLCOC SBVA LLN: 2.56
DLCOC SBVA PRE REF: 44.9 %
DLCOC SBVA REF: 4.02
DLCOC SINGLE BREATH LLN: 13.47
DLCOC SINGLE BREATH PRE REF: 25.7 %
DLCOC SINGLE BREATH REF: 19.2
DLCOCSBVAULN: 5.49
DLCOCSINGLEBREATHULN: 24.93
DLCOCSINGLEBREATHZSCORE: -4.09
DLCOSINGLEBREATHULN: 24.93
DLCOSINGLEBREATHZSCORE: -4.17
DLCOVA LLN: 2.56
DLCOVA PRE REF: 42.5 %
DLCOVA PRE: 1.71 ML/(MIN*MMHG*L) (ref 2.56–5.49)
DLCOVA REF: 4.02
DLCOVAULN: 5.49
DLVAADJ PRE: 1.81 ML/(MIN*MMHG*L) (ref 2.56–5.49)
ERV LLN: -16449.51
ERV PRE REF: 44.6 %
ERV REF: 0.49
ERVULN: ABNORMAL
FEF 25 75 LLN: 0.8
FEF 25 75 PRE REF: 25.8 %
FEF 25 75 REF: 2.2
FET100 CHG: 16.6 %
FEV05 LLN: 0.66
FEV05 REF: 1.51
FEV1 CHG: 9.9 %
FEV1 FVC LLN: 62
FEV1 FVC PRE REF: 89.7 %
FEV1 FVC REF: 77
FEV1 LLN: 1.29
FEV1 PRE REF: 54.6 %
FEV1 REF: 1.85
FEV1FVCZSCORE: -0.92
FEV1ZSCORE: -2.43
FRCPLETH LLN: 1.84
FRCPLETH PREREF: 83.2 %
FRCPLETH REF: 2.66
FRCPLETHULN: 3.49
FVC CHG: 8.6 %
FVC LLN: 1.7
FVC PRE REF: 60.1 %
FVC REF: 2.43
FVCZSCORE: -2.2
IVC PRE: 1.59 L (ref 1.7–3.21)
IVC SINGLE BREATH LLN: 1.7
IVC SINGLE BREATH PRE REF: 65.3 %
IVC SINGLE BREATH REF: 2.43
IVCSINGLEBREATHULN: 3.21
LLN IC: -16448.23
PEF LLN: 2.98
PEF PRE REF: 67.1 %
PEF REF: 4.64
PHYSICIAN COMMENT: ABNORMAL
POST FEF 25 75: 0.66 L/S (ref 0.8–3.6)
POST FET 100: 7.82 SEC
POST FEV1 FVC: 69.97 % (ref 62.29–89.89)
POST FEV1: 1.11 L (ref 1.29–2.39)
POST FEV5: 0.89 L (ref 0.66–2.37)
POST FVC: 1.59 L (ref 1.7–3.21)
POST PEF: 2.41 L/S (ref 2.98–6.3)
PRE DLCO: 4.68 ML/(MIN*MMHG) (ref 13.47–24.93)
PRE ERV: 0.22 L (ref -16449.51–16450.49)
PRE FEF 25 75: 0.57 L/S (ref 0.8–3.6)
PRE FET 100: 6.71 SEC
PRE FEV05 REF: 54.7 %
PRE FEV1 FVC: 69.14 % (ref 62.29–89.89)
PRE FEV1: 1.01 L (ref 1.29–2.39)
PRE FEV5: 0.83 L (ref 0.66–2.37)
PRE FRC PL: 2.22 L (ref 1.84–3.49)
PRE FVC: 1.46 L (ref 1.7–3.21)
PRE IC: 1.37 L (ref -16448.23–16451.77)
PRE PEF: 3.11 L/S (ref 2.98–6.3)
PRE REF IC: 77.5 %
PRE RV: 2 L (ref 1.6–2.75)
PRE TLC: 3.59 L (ref 3.78–5.76)
RAW PRE REF: 167.4 %
RAW PRE: 5.12 CMH2O*S/L (ref 3.06–3.06)
RAW REF: 3.06
REF IC: 1.77
RV LLN: 1.6
RV PRE REF: 91.9 %
RV REF: 2.18
RVTLC LLN: 37
RVTLC PRE REF: 120.7 %
RVTLC PRE: 55.72 % (ref 36.57–55.75)
RVTLC REF: 46
RVTLCULN: 56
RVULN: 2.75
SGAW PRE REF: 72.7 %
SGAW PRE: 0.07 1/(CMH2O*S) (ref 0.1–0.1)
SGAW REF: 0.1
TLC LLN: 3.78
TLC PRE REF: 75.2 %
TLC REF: 4.77
TLC ULN: 5.76
TLCZSCORE: -1.97
ULN IC: ABNORMAL
VA PRE: 2.74 L (ref 4.62–4.62)
VA SINGLE BREATH LLN: 4.62
VA SINGLE BREATH PRE REF: 59.2 %
VA SINGLE BREATH REF: 4.62
VASINGLEBREATHULN: 4.62
VC LLN: 1.7
VC PRE REF: 65.3 %
VC PRE: 1.59 L (ref 1.7–3.21)
VC REF: 2.43
VC ULN: 3.21

## 2024-07-08 PROCEDURE — 94726 PLETHYSMOGRAPHY LUNG VOLUMES: CPT | Mod: 26,S$GLB,, | Performed by: INTERNAL MEDICINE

## 2024-07-08 PROCEDURE — 94729 DIFFUSING CAPACITY: CPT | Mod: 26,S$GLB,, | Performed by: INTERNAL MEDICINE

## 2024-07-08 PROCEDURE — 94618 PULMONARY STRESS TESTING: CPT | Mod: 26,S$GLB,, | Performed by: INTERNAL MEDICINE

## 2024-07-08 PROCEDURE — 94060 EVALUATION OF WHEEZING: CPT | Mod: 26,59,S$GLB, | Performed by: INTERNAL MEDICINE

## 2024-07-08 NOTE — PROCEDURES
Misa Barajas is a 78 y.o.  female patient, who presents for a 6 minute walk test ordered by Dr Uyen.  The diagnosis is Qualify for Oxygen, COPD.  The patient's BMI is 30.1 kg/m2. Predicted distance (lower limit of normal) is 223.78 meters.    Test Results:    The test was performed.  The patient stopped 1  times for a total of 150 seconds.  The total time walked was 30 seconds.  During walking, the patient reported:  No complaints. The patient used   during testing.     07/08/2024---------Distance:   ( )200 feet     O2 Sat % Supplemental Oxygen Heart Rate Blood Pressure Berto Scale   Pre-exercise  (Resting) 87 % Room Air 65 bpm 114/64  mmHg 3   During Exercise          mmHg     Post-exercise             mmHg       Recovery Time:      Oxygen Qualification:     O2 Sat % Supplemental Oxygen Heart Rate Blood Pressure Berto Scale   Pre-exercise  (Resting) 96 % 2 L/M  60 bpm  129/62    mmHg 3    During Exercise 89 %  2 L/M  74 bpm  114/66   mmHg 3    Post-exercise   94 %  2 L/M  66 bpm      mmHg  3      Recovery Time: 120 seconds    Performing nurse/tech: Raissa CRT    PREVIOUS STUDY:   The patient has not had a previous study.      CLINICAL INTERPRETATION:  Six minute walk distance is   ( ) with light dyspnea.  During exercise, there was significant desaturation while breathing room air.  The patient may benefit from using supplemental oxygen.  Based upon age and body mass index, exercise capacity is less than predicted.   Oxygen saturation did improve while breathing supplemental oxygen.

## 2024-07-10 ENCOUNTER — OFFICE VISIT (OUTPATIENT)
Dept: CARDIOLOGY | Facility: CLINIC | Age: 79
End: 2024-07-10
Payer: MEDICARE

## 2024-07-10 VITALS — OXYGEN SATURATION: 95 % | DIASTOLIC BLOOD PRESSURE: 54 MMHG | HEART RATE: 66 BPM | SYSTOLIC BLOOD PRESSURE: 121 MMHG

## 2024-07-10 DIAGNOSIS — I70.0 AORTIC ATHEROSCLEROSIS: ICD-10-CM

## 2024-07-10 DIAGNOSIS — I50.32 DIASTOLIC CHF, CHRONIC: ICD-10-CM

## 2024-07-10 DIAGNOSIS — I48.0 PAROXYSMAL ATRIAL FIBRILLATION: ICD-10-CM

## 2024-07-10 DIAGNOSIS — I10 ESSENTIAL HYPERTENSION: Chronic | ICD-10-CM

## 2024-07-10 DIAGNOSIS — I48.3 TYPICAL ATRIAL FLUTTER: ICD-10-CM

## 2024-07-10 DIAGNOSIS — I50.33 ACUTE ON CHRONIC DIASTOLIC CHF (CONGESTIVE HEART FAILURE): ICD-10-CM

## 2024-07-10 DIAGNOSIS — I83.893 VARICOSE VEINS OF BOTH LEGS WITH EDEMA: ICD-10-CM

## 2024-07-10 DIAGNOSIS — I50.32 CHRONIC DIASTOLIC HEART FAILURE: Primary | ICD-10-CM

## 2024-07-10 DIAGNOSIS — I87.2 VENOUS INSUFFICIENCY OF BOTH LOWER EXTREMITIES: ICD-10-CM

## 2024-07-10 DIAGNOSIS — I50.32 CHRONIC HEART FAILURE WITH PRESERVED EJECTION FRACTION: ICD-10-CM

## 2024-07-10 DIAGNOSIS — R60.9 EDEMA, UNSPECIFIED TYPE: ICD-10-CM

## 2024-07-10 PROCEDURE — 99999 PR PBB SHADOW E&M-EST. PATIENT-LVL III: CPT | Mod: PBBFAC,,, | Performed by: STUDENT IN AN ORGANIZED HEALTH CARE EDUCATION/TRAINING PROGRAM

## 2024-07-10 NOTE — PROGRESS NOTES
Subjective:   @Patient ID:  Misa Barajas is a 78 y.o. female who presents for evaluation of No chief complaint on file.      HPI:      78 y.o. female with PMH of COPD, HFpEF (60% on 5/4/22), pAF (eliquis), RFA for typical CCW AAF,  HTN, HLD, COPD on home O2,  presenting to clinic to [facility] care. She also endorses new redness and warmth with associated itching to her RLE. She has small pustules that she noticed to RLE a few days ago. Denies fever, chills, chest pain, cough, SOB, abdominal pain, n/v/d, dysuria, headaches. Has chronic discoloration to BLEs without recent worsening.     Echocardiogram  TTE 3/2/2024    Left Ventricle: The left ventricle is normal in size. Normal wall thickness. Normal wall motion. There is normal systolic function with a visually estimated ejection fraction of 60 - 65%. Grade II diastolic dysfunction.    Right Ventricle: Normal right ventricular cavity size. Wall thickness is normal. Right ventricle wall motion  is normal. Systolic function is normal.    Pulmonary Artery: The estimated pulmonary artery systolic pressure is 37 mmHg.    IVC/SVC: Normal venous pressure at 3 mmHg.     Stress testing  Nuclear stress test 5/13/2022    Equivocal myocardial perfusion scan.    There is a mild to moderate intensity, small to moderate sized, equivocal perfusion abnormality that is fixed in the inferior and inferolateral wall(s). This finding is equivocal due to bowel interference.    There are no other significant perfusion abnormalities.    The visually estimated ejection fraction is normal at rest and normal during stress.    There is normal wall motion at rest and post stress.    LV cavity size is normal at rest and normal at stress.    The EKG portion of this study is negative for ischemia.    The patient reported no chest pain during the stress test.    There were no arrhythmias during stress.    When compared to the previous study from 4/15/2021, the inferior wall defect is now  present on stress and rest images. There is underlying bowel which may be interfering.    Patient Active Problem List    Diagnosis Date Noted    Acute biliary pancreatitis 07/27/2024    Cellulitis 07/12/2024    Gram-negative bacteremia - Pasturella multocida 07/12/2024    Anemia 07/12/2024    Leucocytosis 07/12/2024    Former heavy tobacco smoker 06/19/2024    Acute on chronic congestive heart failure 05/30/2024    Typical atrial flutter 05/28/2024    Orthostatic hypotension 05/21/2024    Hypotension 05/20/2024    Class 1 obesity due to excess calories with serious comorbidity and body mass index (BMI) of 30.0 to 30.9 in adult 05/16/2024    Acute interstitial nephritis 05/15/2024    Hypermagnesemia 05/12/2024    Urinary retention 05/10/2024    Hypophosphatemia 05/10/2024    Elevated troponin 05/08/2024    Wound of right lower extremity 05/08/2024    Acute cystitis 05/08/2024    Diastolic CHF, chronic 05/07/2024    Unspecified inflammatory spondylopathy, multiple sites in spine 03/13/2024    Hypokalemia 03/02/2024    Hypomagnesemia 03/02/2024    Senile purpura 09/21/2022    Osteoporosis 08/22/2022    COPD (chronic obstructive pulmonary disease) 08/22/2022     Continue current medication. F/u with pulmonology      Epigastric abdominal pain 05/12/2022    Right sided hydroureteronephrosis 05/04/2022    Chronic anticoagulation 05/04/2022    Acute renal failure superimposed on stage 3b chronic kidney disease 05/02/2022    Right ureteral stone 04/28/2022    Lymphedema of both lower extremities 01/25/2022    Venous insufficiency of both lower extremities 01/25/2022    Venous stasis dermatitis of both lower extremities 01/25/2022    Chronic diastolic heart failure 12/05/2021    Chronic respiratory failure with hypoxia 06/26/2021    Centrilobular emphysema 06/22/2021    Paroxysmal atrial fibrillation 06/07/2021    Acute on chronic diastolic heart failure 12/24/2019    Varicose veins of both legs with edema 08/28/2019     "Overweight with body mass index (BMI) of 27 to 27.9 in adult 08/22/2019    CKD (chronic kidney disease) stage 3, GFR 30-59 ml/min 02/14/2018    Breast calcification, right 07/20/2017    Calcified granuloma of lung 05/30/2017     Seen on renal stone CT dated 05/30/17      RBBB 05/17/2017    Hyperlipidemia 11/20/2016    Aortic atherosclerosis 08/08/2016     As seen on imaging dated 08/09/10      Osteoarthritis of knee 01/14/2016    Renal osteodystrophy 01/14/2016    Secondary hyperparathyroidism of renal origin 11/27/2015    Drug-induced polyneuropathy 11/27/2015    Arthritis of facet joints at multiple vertebral levels 07/24/2015     Seen on CT renal stone dated 07/24/15      Essential hypertension 05/28/2015    Nephrolithiasis 06/02/2014                    LABS  CBC  @LABRCNTIP(WBC:3,RBC:3,HGB:3,HCT:3,PLT:3,MCV:3,MCH:3,MCHC:3)@  BMP  @LABRCNTIP(NA:3,K:3,CO2:3,CL:3,BUN:3,Creatinine:3,GLU:3,GFR:3)@    POCT-Glucose  No results found for: "POCTGLUCOSE"    @LABRCNTIP(Calcium:3,MG:3,PHOS:3)@  LFT  @LABRCNTIP(PROT:3,Albumin:3,,Bilitot:3,AST:3,Alkphos:3,ALT:3)@  GFR     COAGS  @LABRCNTIP(PT:3,INR:3,APTT:3)@  CE  @LABRCNTIP(troponini:3,cktotal:3,ckmb:3)@  ABGs  @[id number](PH,PCO2,PO2,HCO3,POCSATURATED,BE)@  BNP  @LABRCNTIP(BNP:3)@    LAST HbA1c  Lab Results   Component Value Date    HGBA1C 5.3 05/08/2024       Lipid panel  Lab Results   Component Value Date    CHOL 119 (L) 07/27/2024    CHOL 148 08/21/2023    CHOL 150 02/15/2023     Lab Results   Component Value Date    HDL 52 07/27/2024    HDL 66 08/21/2023    HDL 75 02/15/2023     Lab Results   Component Value Date    LDLCALC 57.8 (L) 07/27/2024    LDLCALC 67.0 08/21/2023    LDLCALC 60.6 (L) 02/15/2023     Lab Results   Component Value Date    TRIG 46 07/27/2024    TRIG 75 08/21/2023    TRIG 72 02/15/2023     Lab Results   Component Value Date    CHOLHDL 43.7 07/27/2024    CHOLHDL 44.6 08/21/2023    CHOLHDL 50.0 02/15/2023            Review of Systems   Cardiovascular:  " Positive for leg swelling.   All other systems reviewed and are negative.      Objective:   General Appearance: Pleasant, NAD  Eyes: PERRL, EOMI, anicteric  ENT: MMM w/o erythema  Neck: Supple, JVP < 7cm w/o HJR, 2+ carotid pulses, no bruits.  Lungs: Normal inspiratory effort, clear to auscultation bilaterally.  Heart: Regular rate & rhythm, normal S1 and S2, no murmurs, rubs, or gallops.  Abdomen: Soft, non-tender, non-distended. Normal bowel sounds.   Skin/Extremities: +edema, gauze  Peripheral pulses: 2+ radial, dorsalis pedis and posterior tibialis bilaterally.  Neurologic: No gross cranial nerve or focal neurologic deficits.  Psychiatric: Appropriate, normal affect.        Assessment:     1. Chronic diastolic heart failure    2. Acute on chronic congestive heart failure    3. Typical atrial flutter    4. Paroxysmal atrial fibrillation    5. Acute on chronic diastolic CHF (congestive heart failure)    6. Edema, unspecified type    7. Varicose veins of both legs with edema    8. Aortic atherosclerosis    9. Essential hypertension    10. Venous insufficiency of both lower extremities        Plan:       78 y.o. female with PMH of COPD, HFpEF (60% on 5/4/22), pAF (eliquis), HTN, HLD presenting to clinic for follow up.        #HFpEF:  Seems volume up today. Reports good response to PO diuretics at home.  - continue lasix, bb, arbs, SGLT2i   - BP control     #pAFib:  CV 6, HB 1  - AC with eliquis 5mg BID  - continue rate control with metoprolol succ 100mg qD       #HTN:  BP at goal  - Continue losartan to 50mg qD at last visit  - continue metoprolol succinate 100qd, and lasix 40mg qD  - keep a BP diary for next visit     #HLD:  Last lipid panel in 6/2021: chol 140, tri 128, HDL 65, LDL 66  - continue rosuvastatin 5mg qD    # PVD- compression stocking, diuretics  Weight loss  Exercise    Continue with current medical plan and lifestyle changes.  Return sooner for concerns or questions. If symptoms persist go to the  ED  I have reviewed all pertinent data on this patient       She expressed verbal understanding and agreed with the plan      Follow up as scheduled. Return sooner for concerns or questions      I have reviewed the patient's medical history in detail and updated the computerized patient record.    Orders Placed This Encounter   Procedures    COMPRESSION STOCKINGS     Order Specific Question:   Pressure amount:     Answer:   20-30 mmHg    Ambulatory referral/consult to Internal Medicine     Standing Status:   Future     Standing Expiration Date:   8/10/2025     Referral Priority:   Routine     Referral Type:   Consultation     Referral Reason:   Specialty Services Required     Requested Specialty:   Internal Medicine     Number of Visits Requested:   1       Follow up as scheduled. Return sooner for concerns or questions            She expressed verbal understanding and agreed with the plan        Patient's Medications   New Prescriptions    No medications on file   Previous Medications    ACETAMINOPHEN (TYLENOL) 500 MG TABLET    Take 2 tablets (1,000 mg total) by mouth every 8 (eight) hours as needed for Pain.    ALBUTEROL (PROVENTIL HFA) 90 MCG/ACTUATION INHALER    Inhale 2 puffs into the lungs every 6 (six) hours as needed for Wheezing. Rescue    APIXABAN (ELIQUIS) 2.5 MG TAB    Take 1 tablet (2.5 mg total) by mouth 2 (two) times a day.    ASPIRIN (ECOTRIN) 81 MG EC TABLET    Take 81 mg by mouth once daily.    BIOTIN 1 MG TABLET    Take 1,000 mcg by mouth once daily.    BUDESONIDE-GLYCOPYR-FORMOTEROL (BREZTRI AEROSPHERE) 160-9-4.8 MCG/ACTUATION HFAA    Inhale 2 puffs into the lungs 2 (two) times daily.    BUSPIRONE (BUSPAR) 5 MG TAB    Take 5 mg by mouth as needed.    FLUTICASONE PROPIONATE (FLONASE) 50 MCG/ACTUATION NASAL SPRAY    1 spray by Each Nostril route daily as needed (congestion).    INHALATION SPACING DEVICE    Use as directed for inhalation.    METOPROLOL SUCCINATE (TOPROL-XL) 100 MG 24 HR TABLET     Take 1 tablet (100 mg total) by mouth once daily.    VITAMIN D (VITAMIN D3) 1000 UNITS TAB    Take 2 tablets (2,000 Units total) by mouth once daily.   Modified Medications    Modified Medication Previous Medication    FUROSEMIDE (LASIX) 40 MG TABLET furosemide (LASIX) 40 MG tablet       Take 1 tablet (40 mg total) by mouth once daily.    Take 1 tablet (40 mg total) by mouth 2 (two) times daily.   Discontinued Medications    ROSUVASTATIN (CRESTOR) 5 MG TABLET    Take 1 tablet (5 mg total) by mouth once daily.    TAMSULOSIN (FLOMAX) 0.4 MG CAP    Take 1 capsule (0.4 mg total) by mouth once daily.        Valerio Aquino MD  Cardiovascular Disease [facility]

## 2024-07-11 ENCOUNTER — HOSPITAL ENCOUNTER (EMERGENCY)
Facility: HOSPITAL | Age: 79
Discharge: HOME | DRG: 603 | End: 2024-07-11
Attending: EMERGENCY MEDICINE
Payer: MEDICARE

## 2024-07-11 ENCOUNTER — NURSE TRIAGE (OUTPATIENT)
Dept: ADMINISTRATIVE | Facility: CLINIC | Age: 79
End: 2024-07-11
Payer: MEDICARE

## 2024-07-11 VITALS
DIASTOLIC BLOOD PRESSURE: 57 MMHG | OXYGEN SATURATION: 99 % | BODY MASS INDEX: 29.59 KG/M2 | HEART RATE: 76 BPM | WEIGHT: 167 LBS | HEIGHT: 63 IN | TEMPERATURE: 98 F | SYSTOLIC BLOOD PRESSURE: 123 MMHG | RESPIRATION RATE: 18 BRPM

## 2024-07-11 DIAGNOSIS — L03.114 CELLULITIS OF LEFT UPPER EXTREMITY: Primary | ICD-10-CM

## 2024-07-11 DIAGNOSIS — M79.89 LEFT ARM SWELLING: ICD-10-CM

## 2024-07-11 LAB
ALBUMIN SERPL BCP-MCNC: 2.8 G/DL (ref 3.5–5.2)
ALP SERPL-CCNC: 95 U/L (ref 55–135)
ALT SERPL W/O P-5'-P-CCNC: 24 U/L (ref 10–44)
ANION GAP SERPL CALC-SCNC: 6 MMOL/L (ref 8–16)
AST SERPL-CCNC: 38 U/L (ref 10–40)
BASOPHILS # BLD AUTO: 0.05 K/UL (ref 0–0.2)
BASOPHILS NFR BLD: 0.5 % (ref 0–1.9)
BILIRUB SERPL-MCNC: 1 MG/DL (ref 0.1–1)
BUN SERPL-MCNC: 18 MG/DL (ref 8–23)
CALCIUM SERPL-MCNC: 10 MG/DL (ref 8.7–10.5)
CHLORIDE SERPL-SCNC: 106 MMOL/L (ref 95–110)
CO2 SERPL-SCNC: 28 MMOL/L (ref 23–29)
CREAT SERPL-MCNC: 1.3 MG/DL (ref 0.5–1.4)
DIFFERENTIAL METHOD BLD: ABNORMAL
EOSINOPHIL # BLD AUTO: 0.2 K/UL (ref 0–0.5)
EOSINOPHIL NFR BLD: 1.7 % (ref 0–8)
ERYTHROCYTE [DISTWIDTH] IN BLOOD BY AUTOMATED COUNT: 13.8 % (ref 11.5–14.5)
EST. GFR  (NO RACE VARIABLE): 41.6 ML/MIN/1.73 M^2
GLUCOSE SERPL-MCNC: 98 MG/DL (ref 70–110)
HCT VFR BLD AUTO: 37 % (ref 37–48.5)
HGB BLD-MCNC: 11.6 G/DL (ref 12–16)
IMM GRANULOCYTES # BLD AUTO: 0.03 K/UL (ref 0–0.04)
IMM GRANULOCYTES NFR BLD AUTO: 0.3 % (ref 0–0.5)
LACTATE SERPL-SCNC: 1.8 MMOL/L (ref 0.5–2.2)
LYMPHOCYTES # BLD AUTO: 1.2 K/UL (ref 1–4.8)
LYMPHOCYTES NFR BLD: 12.3 % (ref 18–48)
MCH RBC QN AUTO: 29.3 PG (ref 27–31)
MCHC RBC AUTO-ENTMCNC: 31.4 G/DL (ref 32–36)
MCV RBC AUTO: 93 FL (ref 82–98)
MONOCYTES # BLD AUTO: 0.9 K/UL (ref 0.3–1)
MONOCYTES NFR BLD: 9.1 % (ref 4–15)
NEUTROPHILS # BLD AUTO: 7.5 K/UL (ref 1.8–7.7)
NEUTROPHILS NFR BLD: 76.1 % (ref 38–73)
NRBC BLD-RTO: 0 /100 WBC
OHS QRS DURATION: 158 MS
OHS QTC CALCULATION: 484 MS
PLATELET # BLD AUTO: 189 K/UL (ref 150–450)
PMV BLD AUTO: 9.6 FL (ref 9.2–12.9)
POTASSIUM SERPL-SCNC: 3.3 MMOL/L (ref 3.5–5.1)
PROT SERPL-MCNC: 6 G/DL (ref 6–8.4)
RBC # BLD AUTO: 3.96 M/UL (ref 4–5.4)
SODIUM SERPL-SCNC: 140 MMOL/L (ref 136–145)
WBC # BLD AUTO: 9.81 K/UL (ref 3.9–12.7)

## 2024-07-11 PROCEDURE — 85025 COMPLETE CBC W/AUTO DIFF WBC: CPT

## 2024-07-11 PROCEDURE — 87154 CUL TYP ID BLD PTHGN 6+ TRGT: CPT

## 2024-07-11 PROCEDURE — 80053 COMPREHEN METABOLIC PANEL: CPT

## 2024-07-11 PROCEDURE — 99285 EMERGENCY DEPT VISIT HI MDM: CPT | Mod: 25

## 2024-07-11 PROCEDURE — 87040 BLOOD CULTURE FOR BACTERIA: CPT

## 2024-07-11 PROCEDURE — 25000003 PHARM REV CODE 250

## 2024-07-11 PROCEDURE — 83605 ASSAY OF LACTIC ACID: CPT | Performed by: EMERGENCY MEDICINE

## 2024-07-11 RX ORDER — CEFADROXIL 500 MG/1
1000 CAPSULE ORAL DAILY
Qty: 14 CAPSULE | Refills: 0 | Status: ON HOLD | OUTPATIENT
Start: 2024-07-11 | End: 2024-07-17 | Stop reason: HOSPADM

## 2024-07-11 RX ORDER — CEFADROXIL 1000 MG/1
1 TABLET ORAL
Status: COMPLETED | OUTPATIENT
Start: 2024-07-11 | End: 2024-07-11

## 2024-07-11 RX ADMIN — CEFADROXIL 1 G: 1000 TABLET ORAL at 02:07

## 2024-07-11 NOTE — ED NOTES
Pt presents to the ED w c/o swelling to left arm. Pt states that two days ago her cat scratched and yesterday her arm had some clear discharge coming from it. Pt states this AM when she woke up her arm was swollen and painful to touch.

## 2024-07-11 NOTE — ED PROVIDER NOTES
Encounter Date: 7/11/2024       History     Chief Complaint   Patient presents with    Arm Swelling     Low on protein and hx of CHF, here for left arm swelling from elbow down     78 year old female with a past medical history of COPD, A. Fib, here with left sided painful erythematous rash which extends from her medial elbow to her fifth digit. She states that the swelling started 2 days ago when she noticed her arm was swelling and leaking clear fluid. She managed to wipe the leakage with bandages but this morning when she woke up she noticed that her elbow was red and tender. She is unaware of what may have aggravated this event but does report that her cat scratched her near left wrist a couple of days ago. She reports subjective fever. She denies chest pain, SOB, n/v, any other symptoms.    The history is provided by the patient and a relative.     Review of patient's allergies indicates:   Allergen Reactions    Adhesive tape-silicones     Adhesive Rash     Pt states she removed a LATEX bandaid and the skin beneath was swollen and red. No other latex causes a reaction.     Past Medical History:   Diagnosis Date    Acute hypoxemic respiratory failure 6/26/2021    Acute superficial venous thrombosis of lower extremity, bilateral 1/25/2022    Aortic atherosclerosis     Arthritis     knee joint pain    Asthma-COPD overlap syndrome 8/22/2022    Breast cancer 2002    left breast & lymph nodes-s/p sx with chemo    Bronchitis     with flu-2/2014    Cataracts, bilateral     Chronic anticoagulation 5/4/2022    Chronic diastolic heart failure 12/24/2019    Chronic kidney disease, stage 3     Class 1 obesity due to excess calories with serious comorbidity and body mass index (BMI) of 30.0 to 30.9 in adult 5/16/2024    History of chemotherapy     last treatment 12/2002 (had 8 treatments)    Hyperlipidemia 11/20/2016    Hypertension     Lymphedema of both lower extremities 1/25/2022    Nephrolithiasis 6/2/2014     Osteoporosis     Paroxysmal atrial fibrillation 6/7/2021    Renal calculi     hematuria    Renal osteodystrophy 1/14/2016    Venous stasis dermatitis of both lower extremities 1/25/2022    Vitamin D insufficiency      Past Surgical History:   Procedure Laterality Date    ABLATION, ATRIAL FLUTTER, TYPICAL N/A 6/17/2024    Procedure: Ablation, Atrial Flutter, Typical;  Surgeon: Taz Church MD;  Location: [facility] EP LAB;  Service: Cardiology;  Laterality: N/A;  AFL, RFA, LORENE, MAKAYLA (cx if SR), paul, MB, 3 Prep    BREAST BIOPSY Left 2002    core bx, +    BREAST BIOPSY Right 2018    core    BREAST BIOPSY Right 2019    BREAST LUMPECTOMY Left 2002    CYSTOSCOPY  4/28/2022    Procedure: CYSTOSCOPY;  Surgeon: Vik Ware MD;  [facility];  Service: Urology;;    CYSTOSCOPY  5/30/2022    Procedure: CYSTOSCOPY;  Surgeon: Bonnie Knutson MD;  [facility];  Service: Urology;;    ECHOCARDIOGRAM,TRANSESOPHAGEAL N/A 6/17/2024    Procedure: Transesophageal echo (MAKAYLA) intra-procedure log documentation;  Surgeon: Nestor Martinez MD;  Location: [facility] EP LAB;  Service: Cardiology;  Laterality: N/A;    LASER LITHOTRIPSY  5/30/2022    Procedure: LITHOTRIPSY, USING LASER;  Surgeon: Bonnie Knutson MD;  [facility];  Service: Urology;;    LITHOTRIPSY      MASTECTOMY Left 06/2002    left-& lymph node dissection    REPLACEMENT OF STENT Right 5/30/2022    Procedure: REPLACEMENT, STENT;  Surgeon: Bonnie Knutson MD;  Location: [facility] OR 1ST FLR;  Service: Urology;  Laterality: Right;    RETROGRADE PYELOGRAPHY Right 4/28/2022    Procedure: PYELOGRAM, RETROGRADE;  Surgeon: Vik Ware MD;  Location: [facility] OR 1ST FLR;  Service: Urology;  Laterality: Right;    ROBOT-ASSISTED LAPAROSCOPIC PARTIAL NEPHRECTOMY USING DA JESÚS XI Right 3/11/2021    Procedure: XI ROBOTIC NEPHRECTOMY, PARTIAL;  Surgeon: Taz Ortega MD;  Location: [facility] OR 2ND FLR;  Service: Urology;   Laterality: Right;  4hr/ gen with regional  [facility] confirmation:  [id number] for 11:15am case NC    URETEROSCOPIC REMOVAL OF URETERIC CALCULUS Right 5/30/2022    Procedure: REMOVAL, CALCULUS, URETER, URETEROSCOPIC;  Surgeon: Bonnie Knutson MD;  Location: [facility] OR [address] [place] [address];  Service: Urology;  Laterality: Right;    ureteroscopy Bilateral     6.14    URETEROSCOPY Right 5/30/2022    Procedure: URETEROSCOPY;  Surgeon: Bonnie Knutson MD;  Location: [facility] OR [address] [place] [address];  Service: Urology;  Laterality: Right;     Family History   Problem Relation Name Age of Onset    Bone cancer Mother Bibi     Breast cancer Mother Bibi     Arthritis Mother Bibi         Breast Cancer    Breast cancer Sister      Cancer Father Edaudi         Asbestos cancer    No Known Problems Brother      Fibroids Daughter      Crohn's disease Daughter      No Known Problems Daughter      Arthritis Sister          Breast Cancer    Anesthesia problems Neg Hx      Glaucoma Neg Hx       Social History     Tobacco Use    Smoking status: Former     Current packs/day: 0.00     Average packs/day: 1 pack/day for 50.0 years (50.0 ttl pk-yrs)     Types: Cigarettes     Start date: 1960     Quit date: 5/20/2009     Years since quitting: 15.1    Smokeless tobacco: Former   Substance Use Topics    Alcohol use: No    Drug use: No         Physical Exam     Initial Vitals [07/11/24 0914]   BP Pulse Resp Temp SpO2   (!) 85/49 69 16 98.2 °F (36.8 °C) 95 %      MAP       --         Physical Exam    Nursing note and vitals reviewed.  Constitutional: She appears well-developed and well-nourished.   Cardiovascular:  Normal rate, regular rhythm and normal heart sounds.           Pulmonary/Chest: Breath sounds normal. No respiratory distress.   Abdominal: Abdomen is soft. There is no abdominal tenderness.   Musculoskeletal:      Comments: Diffuse, erythematous rash over left upper extremity extending from hand to mid humerus with small scratch over the volar  aspect of the left forearm, less than 1 cm in length. Warm to touch. Indurated. No purulence or drainage. Radial pulses intact and equal bilaterally     Lymphadenopathy:     She has no axillary adenopathy.   Neurological: She is alert. GCS score is 15. GCS eye subscore is 4. GCS verbal subscore is 5. GCS motor subscore is 6.         ED Course   Procedures  Labs Reviewed   CBC W/ AUTO DIFFERENTIAL - Abnormal; Notable for the following components:       Result Value    RBC 3.96 (*)     Hemoglobin 11.6 (*)     MCHC 31.4 (*)     Gran % 76.1 (*)     Lymph % 12.3 (*)     All other components within normal limits   COMPREHENSIVE METABOLIC PANEL - Abnormal; Notable for the following components:    Potassium 3.3 (*)     Albumin 2.8 (*)     eGFR 41.6 (*)     Anion Gap 6 (*)     All other components within normal limits   CULTURE, BLOOD   CULTURE, BLOOD   LACTIC ACID, PLASMA     EKG Readings: (Independently Interpreted)   Initial Reading: No STEMI. Previous EKG: Compared with most recent EKG Previous EKG Date: 06/17/2024. Rhythm: Normal Sinus Rhythm. Heart Rate: 71. Conduction: 1st Degree AV Block, RBBB, Bifasicular and LAFB.       Imaging Results              US Upper Extremity Veins Left (Final result)  Result time 07/11/24 13:39:58      Final result by Nav Rogers MD (07/11/24 13:39:58)                   Impression:      No thrombus in central veins of the left upper extremity.      Electronically signed by: Nav Rogers MD  Date:    07/11/2024  Time:    13:39               Narrative:    EXAMINATION:  US UPPER EXTREMITY VEINS LEFT    CLINICAL HISTORY:  left arm swelling;    TECHNIQUE:  Duplex and color flow Doppler evaluation and dynamic compression was performed of the left upper extremity veins.    COMPARISON:  None    FINDINGS:  Central veins: The internal jugular, subclavian, and axillary veins are patent and free of thrombus.    Arm veins: The brachial, basilic, and cephalic veins are patent and  compressible.    Contralateral subclavian/internal jugular veins: The right subclavian and internal jugular veins are patent and free of thrombus.    Other findings: None.                                       X-Ray Elbow Complete Left (Final result)  Result time 07/11/24 12:38:49      Final result by Lobito Guillen MD (07/11/24 12:38:49)                   Impression:      As above      Electronically signed by: Lobito Guillen MD  Date:    07/11/2024  Time:    12:38               Narrative:    EXAMINATION:  XR ELBOW COMPLETE 3 VIEW LEFT    CLINICAL HISTORY:  left arm swelling;    TECHNIQUE:  Three views of the left elbow were obtained, with AP, lateral, and oblique projections submitted.    COMPARISON:  No relevant comparison examinations are currently available.  Clinical information obtained from the electronic medical record indicates left arm swelling, with no recent trauma history.    FINDINGS:  There is diffuse soft tissue swelling identified.  Visualized osseous structures appear unremarkable, with no evidence of recent or healing fracture, lytic destructive process, or appreciable arthritic change noted.  No elbow joint effusion is demonstrated.  No radiopaque soft tissue foreign body or abnormal gas collection within the soft tissues is appreciated.                                       Medications   cefadroxil tablet 1 g (1 g Oral Given 7/11/24 1430)     Medical Decision Making  78 year old female with a past medical history of COPD, A. Fib on Eliquis presents for evaluation of left arm redness and pain.  On arrival in emergency department, patient alert, no acute distress, not toxic appearing.  Initial blood pressure hypotensive at 85/49, but no subsequent readings reflected this initial pressure.  Suspect inadequate cuff on initial evaluation.    Pathologies I have considered my differential diagnosis include, but are not limited to, cat scratch disease, DVT, cellulitis, dermatomyositis, erysipelas.    No  evidence of DVT on left upper extremity ultrasound.  Radial pulses intact bilaterally.  Patient with full range of motion of bilateral upper extremities.  High suspicion for erysipelas versus cellulitis based on physical exam findings.  Family reports previous adverse reaction to Keflex; review of chart indicates that patient suffered MARQUEZ at a time when she was trialed on multiple antibiotics including Keflex.  Given family's concerns, treated patient with cefadroxil for skin/soft tissue infection.  Strict return precautions given.  Patient and family verbalized understanding and are comfortable with plan.    Amount and/or Complexity of Data Reviewed  Labs: ordered. Decision-making details documented in ED Course.  Radiology: ordered and independent interpretation performed.     Details: XR elbow: no fracture or dislocation noted.  There is soft tissue edema without subcutaneous air, no significant elbow effusion noted.  ECG/medicine tests: ordered and independent interpretation performed.    Risk  Prescription drug management.  Decision regarding hospitalization.  Risk Details: Patient discharged with prescription for cefadroxil.              Attending Attestation:   Physician Attestation Statement for Resident:  As the supervising MD   Physician Attestation Statement: I have personally seen and examined this patient.   I agree with the above history.  -:   As the supervising MD I agree with the above PE.     As the supervising MD I agree with the above treatment, course, plan, and disposition.   -: Pt nontoxic appearing.  Initial vitals with hypotension but repeat vitals showed this was resolved, nurse stated the location of the initial BP cuff was abnormal and may have been wrong.  Otherwise vitals reassuring.  Exam concerning for erysipelas.  No significant pain with L elbow ROM and doubt septic arthritis.  Did do blood cultures.  Given relatively extensive area of erysipelas vs cellulitis on the L arm offered  admission but pt declined and wants to try managing at home, states she will return if it worsens.  BP stayed normal.  No leukocytosis, pt is stable for discharge if this is her preference with strict return precautions.  No signs of necrotizing skin infection.  Discharged on cefadroxil and recommended PCP follow up as soon as possible.   I have reviewed and agree with the residents interpretation of the following: lab data, EKG and x-rays.                 ED Course as of 07/11/24 1737   Thu Jul 11, 2024   1119 Lactic acid, plasma  Within normal limits, lowering concern for sepsis []   1120 Comprehensive metabolic panel(!)  Hypokalemia, most recent 3.7 []   1120 CBC auto differential(!)  Anemia, most recent 11.8  No leukocytosis concerning for infection []      ED Course User Index  [] Bo Hinkle MD                           Clinical Impression:  Final diagnoses:  [L03.114] Cellulitis of left upper extremity (Primary)  [M79.89] Left arm swelling          ED Disposition Condition    Discharge Stable          ED Prescriptions       Medication Sig Dispense Start Date End Date Auth. Provider    cefadroxil (DURICEF) 500 MG Cap Take 2 capsules (1,000 mg total) by mouth once daily. for 7 days 14 capsule 7/11/2024 7/18/2024 Bo Hinkle MD          Follow-up Information    None          Bo Hinkle MD  Resident  07/11/24 [id number]       Andres Flower MD  07/13/24 [id number]

## 2024-07-11 NOTE — TELEPHONE ENCOUNTER
Per Esther, pt daughter-Seen by new cardiologist yesterday, noticed some swelling to LUE, believed to be fluid retention from lack of protein. Pt states swelling much worse this morning, from elbow to fingertips, hot & painful to touch.     Dispo-ER now for eval. Pt & daughter vu.     Reason for Disposition   SEVERE arm swelling (e.g., all of arm looks swollen)    Additional Information   Negative: SEVERE difficulty breathing (e.g., struggling for each breath, speaks in single words)   Negative: Sounds like a life-threatening emergency to the triager    Protocols used: Arm Swelling and Edema-A-AH

## 2024-07-12 ENCOUNTER — HOSPITAL ENCOUNTER (INPATIENT)
Facility: HOSPITAL | Age: 79
LOS: 6 days | Discharge: HOME HEALTH | DRG: 603 | End: 2024-07-18
Attending: EMERGENCY MEDICINE | Admitting: EMERGENCY MEDICINE
Payer: MEDICARE

## 2024-07-12 ENCOUNTER — PATIENT OUTREACH (OUTPATIENT)
Dept: EMERGENCY MEDICINE | Facility: HOSPITAL | Age: 79
End: 2024-07-12
Payer: MEDICARE

## 2024-07-12 DIAGNOSIS — R78.81 GRAM-NEGATIVE BACTEREMIA: Primary | ICD-10-CM

## 2024-07-12 DIAGNOSIS — I50.32 DIASTOLIC CHF, CHRONIC: ICD-10-CM

## 2024-07-12 DIAGNOSIS — I48.0 PAROXYSMAL ATRIAL FIBRILLATION: ICD-10-CM

## 2024-07-12 DIAGNOSIS — J96.11 CHRONIC RESPIRATORY FAILURE WITH HYPOXIA: ICD-10-CM

## 2024-07-12 DIAGNOSIS — L03.90 CELLULITIS: ICD-10-CM

## 2024-07-12 DIAGNOSIS — L03.818 CELLULITIS OF OTHER SPECIFIED SITE: ICD-10-CM

## 2024-07-12 DIAGNOSIS — I10 ESSENTIAL HYPERTENSION: ICD-10-CM

## 2024-07-12 DIAGNOSIS — N18.32 STAGE 3B CHRONIC KIDNEY DISEASE: ICD-10-CM

## 2024-07-12 DIAGNOSIS — D64.9 ANEMIA, UNSPECIFIED TYPE: ICD-10-CM

## 2024-07-12 DIAGNOSIS — R07.9 CHEST PAIN: ICD-10-CM

## 2024-07-12 DIAGNOSIS — Z79.01 CHRONIC ANTICOAGULATION: ICD-10-CM

## 2024-07-12 DIAGNOSIS — E78.5 HYPERLIPIDEMIA, UNSPECIFIED HYPERLIPIDEMIA TYPE: ICD-10-CM

## 2024-07-12 PROBLEM — D72.829 LEUCOCYTOSIS: Status: ACTIVE | Noted: 2024-07-12

## 2024-07-12 LAB
ACINETOBACTER CALCOACETICUS/BAUMANNII COMPLEX: NOT DETECTED
ALBUMIN SERPL BCP-MCNC: 2.8 G/DL (ref 3.5–5.2)
ALLENS TEST: NORMAL
ALP SERPL-CCNC: 125 U/L (ref 55–135)
ALT SERPL W/O P-5'-P-CCNC: 25 U/L (ref 10–44)
ANION GAP SERPL CALC-SCNC: 13 MMOL/L (ref 8–16)
AST SERPL-CCNC: 53 U/L (ref 10–40)
BACTERIA #/AREA URNS AUTO: ABNORMAL /HPF
BACTEROIDES FRAGILIS: NOT DETECTED
BASOPHILS # BLD AUTO: 0.04 K/UL (ref 0–0.2)
BASOPHILS NFR BLD: 0.3 % (ref 0–1.9)
BILIRUB SERPL-MCNC: 1.6 MG/DL (ref 0.1–1)
BILIRUB UR QL STRIP: NEGATIVE
BUN SERPL-MCNC: 23 MG/DL (ref 8–23)
BUN SERPL-MCNC: 29 MG/DL (ref 6–30)
CALCIUM SERPL-MCNC: 10.5 MG/DL (ref 8.7–10.5)
CANDIDA ALBICANS: NOT DETECTED
CANDIDA AURIS: NOT DETECTED
CANDIDA GLABRATA: NOT DETECTED
CANDIDA KRUSEI: NOT DETECTED
CANDIDA PARAPSILOSIS: NOT DETECTED
CANDIDA TROPICALIS: NOT DETECTED
CHLORIDE SERPL-SCNC: 103 MMOL/L (ref 95–110)
CHLORIDE SERPL-SCNC: 104 MMOL/L (ref 95–110)
CLARITY UR REFRACT.AUTO: ABNORMAL
CO2 SERPL-SCNC: 24 MMOL/L (ref 23–29)
COLOR UR AUTO: YELLOW
CREAT SERPL-MCNC: 1.2 MG/DL (ref 0.5–1.4)
CREAT SERPL-MCNC: 1.6 MG/DL (ref 0.5–1.4)
CRYPTOCOCCUS NEOFORMANS/GATTII: NOT DETECTED
CTX-M GENE (ESBL PRODUCER): NORMAL
DIFFERENTIAL METHOD BLD: ABNORMAL
ENTEROBACTER CLOACAE COMPLEX: NOT DETECTED
ENTEROBACTERALES: NOT DETECTED
ENTEROCOCCUS FAECALIS: NOT DETECTED
ENTEROCOCCUS FAECIUM: NOT DETECTED
EOSINOPHIL # BLD AUTO: 0.1 K/UL (ref 0–0.5)
EOSINOPHIL NFR BLD: 0.5 % (ref 0–8)
ERYTHROCYTE [DISTWIDTH] IN BLOOD BY AUTOMATED COUNT: 14.1 % (ref 11.5–14.5)
ESCHERICHIA COLI: NOT DETECTED
EST. GFR  (NO RACE VARIABLE): 45.8 ML/MIN/1.73 M^2
GLUCOSE SERPL-MCNC: 88 MG/DL (ref 70–110)
GLUCOSE SERPL-MCNC: 98 MG/DL (ref 70–110)
GLUCOSE UR QL STRIP: NEGATIVE
HAEMOPHILUS INFLUENZAE: NOT DETECTED
HCT VFR BLD AUTO: 40.4 % (ref 37–48.5)
HCT VFR BLD CALC: 40 %PCV (ref 36–54)
HGB BLD-MCNC: 12.7 G/DL (ref 12–16)
HGB UR QL STRIP: NEGATIVE
HYALINE CASTS UR QL AUTO: 1 /LPF
IMM GRANULOCYTES # BLD AUTO: 0.07 K/UL (ref 0–0.04)
IMM GRANULOCYTES NFR BLD AUTO: 0.5 % (ref 0–0.5)
IMP GENE (CARBAPENEM RESISTANT): NORMAL
KETONES UR QL STRIP: NEGATIVE
KLEBSIELLA AEROGENES: NOT DETECTED
KLEBSIELLA OXYTOCA: NOT DETECTED
KLEBSIELLA PNEUMONIAE GROUP: NOT DETECTED
KPC RESISTANCE GENE (CARBAPENEM): NORMAL
LDH SERPL L TO P-CCNC: 1.24 MMOL/L (ref 0.5–2.2)
LEUKOCYTE ESTERASE UR QL STRIP: ABNORMAL
LISTERIA MONOCYTOGENES: NOT DETECTED
LYMPHOCYTES # BLD AUTO: 1.5 K/UL (ref 1–4.8)
LYMPHOCYTES NFR BLD: 10 % (ref 18–48)
MCH RBC QN AUTO: 29.4 PG (ref 27–31)
MCHC RBC AUTO-ENTMCNC: 31.4 G/DL (ref 32–36)
MCR-1: NORMAL
MCV RBC AUTO: 94 FL (ref 82–98)
MEC A/C AND MREJ (MRSA): NORMAL
MEC A/C: NORMAL
MICROSCOPIC COMMENT: ABNORMAL
MONOCYTES # BLD AUTO: 1.2 K/UL (ref 0.3–1)
MONOCYTES NFR BLD: 7.5 % (ref 4–15)
NDM GENE (CARBAPENEM RESISTANT): NORMAL
NEISSERIA MENINGITIDIS: NOT DETECTED
NEUTROPHILS # BLD AUTO: 12.6 K/UL (ref 1.8–7.7)
NEUTROPHILS NFR BLD: 81.2 % (ref 38–73)
NITRITE UR QL STRIP: NEGATIVE
NRBC BLD-RTO: 0 /100 WBC
OXA-48-LIKE (CARBAPENEM RESISTANT): NORMAL
PH UR STRIP: 7 [PH] (ref 5–8)
PLATELET # BLD AUTO: 215 K/UL (ref 150–450)
PMV BLD AUTO: 10.6 FL (ref 9.2–12.9)
POC IONIZED CALCIUM: 1.27 MMOL/L (ref 1.06–1.42)
POC TCO2 (MEASURED): 30 MMOL/L (ref 23–29)
POTASSIUM BLD-SCNC: 3.7 MMOL/L (ref 3.5–5.1)
POTASSIUM SERPL-SCNC: 3.9 MMOL/L (ref 3.5–5.1)
PROT SERPL-MCNC: 7 G/DL (ref 6–8.4)
PROT UR QL STRIP: ABNORMAL
PROTEUS SPECIES: NOT DETECTED
PSEUDOMONAS AERUGINOSA: NOT DETECTED
RBC # BLD AUTO: 4.32 M/UL (ref 4–5.4)
RBC #/AREA URNS AUTO: 3 /HPF (ref 0–4)
SALMONELLA SP: NOT DETECTED
SAMPLE: ABNORMAL
SAMPLE: NORMAL
SERRATIA MARCESCENS: NOT DETECTED
SITE: NORMAL
SODIUM BLD-SCNC: 141 MMOL/L (ref 136–145)
SODIUM SERPL-SCNC: 141 MMOL/L (ref 136–145)
SP GR UR STRIP: 1 (ref 1–1.03)
SQUAMOUS #/AREA URNS AUTO: 5 /HPF
STAPHYLOCOCCUS AUREUS: NOT DETECTED
STAPHYLOCOCCUS EPIDERMIDIS: NOT DETECTED
STAPHYLOCOCCUS LUGDUNESIS: NOT DETECTED
STAPHYLOCOCCUS SPECIES: NOT DETECTED
STENOTROPHOMONAS MALTOPHILIA: NOT DETECTED
STREPTOCOCCUS AGALACTIAE: NOT DETECTED
STREPTOCOCCUS PNEUMONIAE: NOT DETECTED
STREPTOCOCCUS PYOGENES: NOT DETECTED
STREPTOCOCCUS SPECIES: NOT DETECTED
URN SPEC COLLECT METH UR: ABNORMAL
VAN A/B (VRE GENE): NORMAL
VIM GENE (CARBAPENEM RESISTANT): NORMAL
WBC # BLD AUTO: 15.42 K/UL (ref 3.9–12.7)
WBC #/AREA URNS AUTO: 37 /HPF (ref 0–5)

## 2024-07-12 PROCEDURE — 87086 URINE CULTURE/COLONY COUNT: CPT | Performed by: HOSPITALIST

## 2024-07-12 PROCEDURE — 11000001 HC ACUTE MED/SURG PRIVATE ROOM

## 2024-07-12 PROCEDURE — 21400001 HC TELEMETRY ROOM

## 2024-07-12 PROCEDURE — 25000003 PHARM REV CODE 250: Performed by: HOSPITALIST

## 2024-07-12 PROCEDURE — 83605 ASSAY OF LACTIC ACID: CPT

## 2024-07-12 PROCEDURE — 25000003 PHARM REV CODE 250: Performed by: EMERGENCY MEDICINE

## 2024-07-12 PROCEDURE — 85025 COMPLETE CBC W/AUTO DIFF WBC: CPT | Performed by: EMERGENCY MEDICINE

## 2024-07-12 PROCEDURE — 80053 COMPREHEN METABOLIC PANEL: CPT | Performed by: EMERGENCY MEDICINE

## 2024-07-12 PROCEDURE — 99900035 HC TECH TIME PER 15 MIN (STAT)

## 2024-07-12 PROCEDURE — 63600175 PHARM REV CODE 636 W HCPCS: Performed by: EMERGENCY MEDICINE

## 2024-07-12 PROCEDURE — 81001 URINALYSIS AUTO W/SCOPE: CPT | Performed by: HOSPITALIST

## 2024-07-12 RX ORDER — SODIUM CHLORIDE 0.9 % (FLUSH) 0.9 %
10 SYRINGE (ML) INJECTION EVERY 12 HOURS PRN
Status: DISCONTINUED | OUTPATIENT
Start: 2024-07-12 | End: 2024-07-18 | Stop reason: HOSPADM

## 2024-07-12 RX ORDER — ASPIRIN 81 MG/1
81 TABLET ORAL DAILY
Status: DISCONTINUED | OUTPATIENT
Start: 2024-07-13 | End: 2024-07-18 | Stop reason: HOSPADM

## 2024-07-12 RX ORDER — GLUCAGON 1 MG
1 KIT INJECTION
Status: DISCONTINUED | OUTPATIENT
Start: 2024-07-12 | End: 2024-07-18 | Stop reason: HOSPADM

## 2024-07-12 RX ORDER — IBUPROFEN 200 MG
16 TABLET ORAL
Status: DISCONTINUED | OUTPATIENT
Start: 2024-07-12 | End: 2024-07-18 | Stop reason: HOSPADM

## 2024-07-12 RX ORDER — NALOXONE HCL 0.4 MG/ML
0.02 VIAL (ML) INJECTION
Status: DISCONTINUED | OUTPATIENT
Start: 2024-07-12 | End: 2024-07-18 | Stop reason: HOSPADM

## 2024-07-12 RX ORDER — FUROSEMIDE 40 MG/1
TABLET ORAL
Status: COMPLETED
Start: 2024-07-12 | End: 2024-07-14

## 2024-07-12 RX ORDER — CHOLECALCIFEROL (VITAMIN D3) 25 MCG
2000 TABLET ORAL DAILY
Status: DISCONTINUED | OUTPATIENT
Start: 2024-07-13 | End: 2024-07-18 | Stop reason: HOSPADM

## 2024-07-12 RX ORDER — DOXYCYCLINE HYCLATE 100 MG
TABLET ORAL
Status: COMPLETED
Start: 2024-07-12 | End: 2024-07-14

## 2024-07-12 RX ORDER — DOXYCYCLINE HYCLATE 100 MG
100 TABLET ORAL EVERY 12 HOURS
Status: DISCONTINUED | OUTPATIENT
Start: 2024-07-12 | End: 2024-07-14

## 2024-07-12 RX ORDER — METOPROLOL SUCCINATE 50 MG/1
100 TABLET, EXTENDED RELEASE ORAL DAILY
Status: DISCONTINUED | OUTPATIENT
Start: 2024-07-13 | End: 2024-07-18 | Stop reason: HOSPADM

## 2024-07-12 RX ORDER — FUROSEMIDE 40 MG/1
40 TABLET ORAL DAILY
Status: DISCONTINUED | OUTPATIENT
Start: 2024-07-13 | End: 2024-07-14

## 2024-07-12 RX ORDER — ATORVASTATIN CALCIUM 20 MG/1
20 TABLET, FILM COATED ORAL DAILY
Status: DISCONTINUED | OUTPATIENT
Start: 2024-07-13 | End: 2024-07-18 | Stop reason: HOSPADM

## 2024-07-12 RX ORDER — ALBUTEROL SULFATE 90 UG/1
2 AEROSOL, METERED RESPIRATORY (INHALATION) EVERY 6 HOURS PRN
Status: DISCONTINUED | OUTPATIENT
Start: 2024-07-12 | End: 2024-07-18 | Stop reason: HOSPADM

## 2024-07-12 RX ORDER — FUROSEMIDE 40 MG/1
40 TABLET ORAL 2 TIMES DAILY
Status: DISCONTINUED | OUTPATIENT
Start: 2024-07-12 | End: 2024-07-12

## 2024-07-12 RX ORDER — IBUPROFEN 200 MG
24 TABLET ORAL
Status: DISCONTINUED | OUTPATIENT
Start: 2024-07-12 | End: 2024-07-18 | Stop reason: HOSPADM

## 2024-07-12 RX ADMIN — DOXYCYCLINE HYCLATE 100 MG: 100 TABLET, COATED ORAL at 09:07

## 2024-07-12 RX ADMIN — PIPERACILLIN SODIUM AND TAZOBACTAM SODIUM 4.5 G: 4; .5 INJECTION, POWDER, FOR SOLUTION INTRAVENOUS at 03:07

## 2024-07-12 RX ADMIN — FUROSEMIDE 40 MG: 40 TABLET ORAL at 07:07

## 2024-07-12 RX ADMIN — APIXABAN 2.5 MG: 2.5 TABLET, FILM COATED ORAL at 09:07

## 2024-07-12 NOTE — ASSESSMENT & PLAN NOTE
Creatine stable for now. BMP reviewed- noted Estimated Creatinine Clearance: 33.7 mL/min (based on SCr of 1.3 mg/dL). according to latest data. Monitor UOP and serial BMP and adjust therapy as needed. Renally dose meds.    Recent Labs   Lab 07/11/24  1000   BUN 18   CREATININE 1.3       I & O     Intake/Output Summary (Last 24 hours) at 7/12/2024 1640  Last data filed at 7/12/2024 1611  Gross per 24 hour   Intake 100 ml   Output --   Net 100 ml

## 2024-07-12 NOTE — ED PROVIDER NOTES
Encounter Date: 7/12/2024       History     Chief Complaint   Patient presents with    Abnormal Lab     Seen yest, on antibiotics, called today + blood cultures     78 year old female with a past medical history of COPD, A. Fib, here with left sided painful erythematous rash which extends from her medial elbow to her fifth digit returns after being seen at the ER yesterday and diagnosed with Erysipelas and d/cd on antibiotics outpatient. She returns because she was called back after GNR's were seen on her gram stain. She states she had a small bout of diarrhea She otherwise denies any new sxs. Denies chest pain, SOB. Denies abd pain, nausea, vomiting.    No change in the rash as documented below  Referenced from 7/11 ED Note: She states that the swelling started 7/9 days ago when she noticed her arm was swelling and leaking clear fluid. She managed to wipe the leakage with bandages but this morning when she woke up she noticed that her elbow was red and tender. She is unaware of what may have aggravated this event but does report that her cat scratched her near left wrist a couple of days ago. She reports subjective fever. She denies chest pain, SOB, n/v, any other symptoms.     The history is provided by the patient and a relative.           Review of patient's allergies indicates:   Allergen Reactions    Adhesive tape-silicones     Adhesive Rash     Pt states she removed a LATEX bandaid and the skin beneath was swollen and red. No other latex causes a reaction.     Past Medical History:   Diagnosis Date    Acute hypoxemic respiratory failure 6/26/2021    Acute superficial venous thrombosis of lower extremity, bilateral 1/25/2022    Aortic atherosclerosis     Arthritis     knee joint pain    Asthma-COPD overlap syndrome 8/22/2022    Breast cancer 2002    left breast & lymph nodes-s/p sx with chemo    Bronchitis     with flu-2/2014    Cataracts, bilateral     Chronic anticoagulation 5/4/2022    Chronic diastolic  heart failure 12/24/2019    Chronic kidney disease, stage 3     Class 1 obesity due to excess calories with serious comorbidity and body mass index (BMI) of 30.0 to 30.9 in adult 5/16/2024    History of chemotherapy     last treatment 12/2002 (had 8 treatments)    Hyperlipidemia 11/20/2016    Hypertension     Lymphedema of both lower extremities 1/25/2022    Nephrolithiasis 6/2/2014    Osteoporosis     Paroxysmal atrial fibrillation 6/7/2021    Renal calculi     hematuria    Renal osteodystrophy 1/14/2016    Venous stasis dermatitis of both lower extremities 1/25/2022    Vitamin D insufficiency      Past Surgical History:   Procedure Laterality Date    ABLATION, ATRIAL FLUTTER, TYPICAL N/A 6/17/2024    Procedure: Ablation, Atrial Flutter, Typical;  Surgeon: Taz Church MD;  Location: [facility] EP LAB;  Service: Cardiology;  Laterality: N/A;  AFL, RFA, LORENE, MAKAYLA (cx if SR), anes, MB, 3 Prep    BREAST BIOPSY Left 2002    core bx, +    BREAST BIOPSY Right 2018    core    BREAST BIOPSY Right 2019    BREAST LUMPECTOMY Left 2002    CYSTOSCOPY  4/28/2022    Procedure: CYSTOSCOPY;  Surgeon: Vik Ware MD;  Location: [facility] OR [address] [place] [address];  Service: Urology;;    CYSTOSCOPY  5/30/2022    Procedure: CYSTOSCOPY;  Surgeon: Bonnie Knutson MD;  Location: [facility] OR [address] [place] [address];  Service: Urology;;    ECHOCARDIOGRAM,TRANSESOPHAGEAL N/A 6/17/2024    Procedure: Transesophageal echo (MAKAYLA) intra-procedure log documentation;  Surgeon: Nestor Martinez MD;  Location: [facility] EP LAB;  Service: Cardiology;  Laterality: N/A;    LASER LITHOTRIPSY  5/30/2022    Procedure: LITHOTRIPSY, USING LASER;  Surgeon: Bonnie Knutson MD;  Location: [facility] OR [address] [place] [address];  Service: Urology;;    LITHOTRIPSY      MASTECTOMY Left 06/2002    left-& lymph node dissection    REPLACEMENT OF STENT Right 5/30/2022    Procedure: REPLACEMENT, STENT;  Surgeon: Bonnie Knutson MD;  Location: [facility] OR [address] [place] [address];  Service: Urology;  Laterality: Right;     RETROGRADE PYELOGRAPHY Right 4/28/2022    Procedure: PYELOGRAM, RETROGRADE;  Surgeon: Vik Ware MD;  Location: [facility] OR 1ST FLR;  Service: Urology;  Laterality: Right;    ROBOT-ASSISTED LAPAROSCOPIC PARTIAL NEPHRECTOMY USING DA JESÚS XI Right 3/11/2021    Procedure: XI ROBOTIC NEPHRECTOMY, PARTIAL;  Surgeon: Taz Ortega MD;  Location: [facility] OR 2ND FLR;  Service: Urology;  Laterality: Right;  4hr/ gen with regional  [facility] confirmation:  [id number] for 11:15am case NC    URETEROSCOPIC REMOVAL OF URETERIC CALCULUS Right 5/30/2022    Procedure: REMOVAL, CALCULUS, URETER, URETEROSCOPIC;  Surgeon: Bonnie Knutson MD;  Location: [facility] OR 1ST FLR;  Service: Urology;  Laterality: Right;    ureteroscopy Bilateral     6.14    URETEROSCOPY Right 5/30/2022    Procedure: URETEROSCOPY;  Surgeon: Bonnie Knutson MD;  [facility];  Service: Urology;  Laterality: Right;     Family History   Problem Relation Name Age of Onset    Bone cancer Mother Bibi     Breast cancer Mother Bibi     Arthritis Mother Bibi         Breast Cancer    Breast cancer Sister      Cancer Father Maxwell         Asbestos cancer    No Known Problems Brother      Fibroids Daughter      Crohn's disease Daughter      No Known Problems Daughter      Arthritis Sister          Breast Cancer    Anesthesia problems Neg Hx      Glaucoma Neg Hx       Social History     Tobacco Use    Smoking status: Former     Current packs/day: 0.00     Average packs/day: 1 pack/day for 50.0 years (50.0 ttl pk-yrs)     Types: Cigarettes     Start date: 1960     Quit date: 5/20/2009     Years since quitting: 15.1    Smokeless tobacco: Former   Substance Use Topics    Alcohol use: No    Drug use: No     Review of Systems    Physical Exam     Initial Vitals [07/12/24 1230]   BP Pulse Resp Temp SpO2   (!) 107/52 65 20 97.8 °F (36.6 °C) 95 %      MAP       --         Physical Exam    Constitutional: She appears well-developed and  well-nourished.   HENT:   Head: Normocephalic and atraumatic.   Eyes: EOM are normal. Pupils are equal, round, and reactive to light.   Neck:   Normal range of motion.  Cardiovascular:  Normal rate, regular rhythm and normal heart sounds.           Pulmonary/Chest: Breath sounds normal. No respiratory distress.   Abdominal: Abdomen is soft. She exhibits no distension.   Musculoskeletal:         General: Normal range of motion.      Cervical back: Normal range of motion.      Comments: Diffuse, erythematous rash over left upper extremity extending from hand to mid humerus with small scratch over the volar aspect of the left forearm, less than 1 cm in length. Warm to touch. Indurated. No purulence or drainage. Radial pulses intact and equal bilaterally           Neurological: She is alert and oriented to person, place, and time.   Skin: Skin is warm.   Psychiatric: She has a normal mood and affect. Thought content normal.         ED Course   Procedures  Labs Reviewed   ISTAT PROCEDURE - Abnormal; Notable for the following components:       Result Value    POC Creatinine 1.6 (*)     POC TCO2 (MEASURED) 30 (*)     All other components within normal limits   CULTURE, BLOOD   CULTURE, BLOOD   CBC W/ AUTO DIFFERENTIAL   COMPREHENSIVE METABOLIC PANEL   ISTAT LACTATE   ISTAT CHEM8          Imaging Results    None          Medications   piperacillin-tazobactam (ZOSYN) 4.5 g in D5W 100 mL IVPB (MB+) (0 g Intravenous Stopped 7/12/24 1611)     Medical Decision Making  78 year old female with a past medical history of COPD, A. Fib on Eliquis presents for evaluation of left arm redness and pain seen yesterday at the E.D. diagnosed with erysipelas and sent home with abx. Gram stain findings of GNR and told to return to the ED for antibiosis. Denies any new symptoms, vitals not suggesting septic. Obtaining baseline CBC, repeat blood culture, dose of Zosyn. Dispo is admit for management of gram-negative bacteremia.     Amount and/or  Complexity of Data Reviewed  Labs: ordered.    Risk  Decision regarding hospitalization.                                      Clinical Impression:  Final diagnoses:  [R78.81] Gram-negative bacteremia (Primary)          ED Disposition Condition    Admit Stable                Gilbert York MD  Resident  07/12/24 2317

## 2024-07-12 NOTE — PROGRESS NOTES
Roosevelt Viera MA  ED Navigator  Emergency Department    Project: [facility] ED Navigator  Role: Community Health Worker    Date: 07/12/2024  Patient Name: Misa Barajas  MRN: [id number]  PCP: Celia Carlisle MD    Assessment:     Misa Barajas is a 78 y.o. female who has presented to ED for Cellulitis of left upper extremity. Patient has visited the ED 1 times in the past 3 months. Patient did not contact PCP.     ED Navigator Initial Assessment    ED Navigator Enrollment Documentation  Consent to Services  Does patient consent to completing the assessment?: Yes  Contact  Method of Initial Contact: Phone  Transportation  Does the patient have issues with Transportation?: No  Does the patient have transportation to and from healthcare appointments?: Yes  Insurance Coverage  Do you have coverage/adequate coverage?: Yes  Type/kind of coverage: [employer]  Is patient able to afford co-pays/deductibles?: Yes  Is patient able to afford HME or supplies?: Yes  Does patient have an established [facility] PCP?: Yes  Able to access?: Yes  Specialist Appointment  Did the patient come to the ED to see a specialist?: No  Does the patient have a pending specialist referral?: No  Does the patient have a specialist appointment made?: No  PCP Follow Up Appointment  Has the patient had an appointment with a primary care provider in the past year?: Yes  Approximate date: 5/27/24  Provider: Celia Carlisle MD  When was the last time you saw your PCP?: 5/27/24  Medications  Is patient able to afford medication?: Yes  Is patient unable to get medication due to lack of transportation?: No  Psychological  Does the patient have psycho-social concerns?: No  Food  Does the patient have concerns about food?: No  Communication/Education  Does the patient have limited English proficiency/English not primary language?: No  Does patient have low literacy and/or low health literacy?: Yes  Does patient have  concerns with care?: No  Does patient have dissatisfaction with care?: No  Other Financial Concerns  Does the patient have immediate financial distress?: No  Does the patient have general financial concerns?: No  Other Social Barriers/Concerns  Does the patient have any additional barriers or concerns?: None  Primary Barrier  Barriers identified: Cognitive barrier (health literacy, language and communication, etc.)  Root Cause of ED Utilization: Chronic Conditions  Plan to address Chronic Conditions: Encourage patient to contact PCP/specialist for follow up per ED discharge instructions  Next steps: Provided Education  Was education/educational materials provided surrounding PCP services/creating a medical home?: Yes Was education verbal or written?: Written     Was education/educational materials provided surrounding low cost, healthy foods?: Yes Was education verbal or written?: Written     Was education/educational materials provided surrounding other items? If so, use comment to explain.: Yes Was education verbal or written?: Written   Plan: Provided information for [facility] 24/7 Nurse triage line, [phone] or [phone]-[facility] ([phone])  Additional Documentation: Successful Enrollment. Spoke with patient daughter Esther. She states mom is doing fine. She said someone called and told her the results was positive for blood culture. Message sent to provider regarding. Staff message sent to [facility] for assistance with getting scheduled for follow up with Karen Jo CMA  Ed Navigator           Social History     Socioeconomic History    Marital status:    Tobacco Use    Smoking status: Former     Current packs/day: 0.00     Average packs/day: 1 pack/day for 50.0 years (50.0 ttl pk-yrs)     Types: Cigarettes     Start date: 1960     Quit date: 5/20/2009     Years since quitting: 15.1    Smokeless tobacco: Former   Substance and Sexual Activity    Alcohol use: No    Drug use: No    Sexual  activity: Not Currently     Partners: Male     Birth control/protection: Partner-Vasectomy     Social Determinants of Health     Financial Resource Strain: Low Risk  (3/3/2024)    Overall Financial Resource Strain (CARDIA)     Difficulty of Paying Living Expenses: Not very hard   Food Insecurity: No Food Insecurity (3/3/2024)    Hunger Vital Sign     Worried About Running Out of Food in the Last Year: Never true     Ran Out of Food in the Last Year: Never true   Transportation Needs: No Transportation Needs (3/3/2024)    PRAPARE - Transportation     Lack of Transportation (Medical): No     Lack of Transportation (Non-Medical): No   Physical Activity: Inactive (3/3/2024)    Exercise Vital Sign     Days of Exercise per Week: 0 days     Minutes of Exercise per Session: 0 min   Stress: No Stress Concern Present (3/3/2024)    [employer] - Occupational Stress Questionnaire     Feeling of Stress : Not at all   Housing Stability: Low Risk  (3/3/2024)    Housing Stability Vital Sign     Unable to Pay for Housing in the Last Year: No     Number of Places Lived in the Last Year: 1     Unstable Housing in the Last Year: No       Plan:   Successful Enrollment. Spoke with patient daughter Esther. She states mom is doing fine. She said someone called and told her the results was positive for blood culture. Message sent to provider regarding. Staff message sent to [facility] for assistance with getting scheduled for follow up with Karen Jo CMA  Ed Navigator

## 2024-07-12 NOTE — ASSESSMENT & PLAN NOTE
Patient is identified as having Diastolic (HFpEF) heart failure that is Chronic. CHF is currently controlled. Latest ECHO performed and demonstrates- Results for orders placed during the hospital encounter of 05/07/24    Echo    Interpretation Summary    Left Ventricle: The left ventricle is normal in size. Ventricular mass is normal. Normal wall thickness. Normal wall motion. There is normal systolic function. Ejection fraction by visual approximation is 55%. Unable to assess diastolic function due to atrial fibrillation.    Right Ventricle: Normal right ventricular cavity size. Wall thickness is normal. Systolic function is mildly reduced.    Aortic Valve: The aortic valve is a trileaflet valve. There is moderate aortic valve sclerosis. There is annular calcification present. Mildly restricted motion. No stenosis.    Tricuspid Valve: There is mild to moderate regurgitation.    Pulmonary Artery: The estimated pulmonary artery systolic pressure is 44 mmHg.    IVC/SVC: Elevated venous pressure at 15 mmHg.

## 2024-07-12 NOTE — ASSESSMENT & PLAN NOTE
Patient's with Normocytic anemia.. Hemoglobin stable. Etiology likely due to chronic disease .  Current CBC reviewed-    Recent Labs   Lab 07/11/24  1000   HGB 11.6*         Component Value Date/Time    MCV 93 07/11/2024 1000    RDW 13.8 07/11/2024 1000    IRON 115 06/23/2016 0947    FERRITIN 98 06/23/2016 0947     Monitor CBC and transfuse if H/H drops below 7/21.

## 2024-07-12 NOTE — ASSESSMENT & PLAN NOTE
continue metoprolol and eliquis. last admission 6/27 - had successful RFA of the CTI for treatment of CTI-dependent atrial flutter. No evidence of intra- or post-procedure complications. Post-ablation ECG shows NSR, and no acute abnormalities.

## 2024-07-12 NOTE — ASSESSMENT & PLAN NOTE
Chronic, controlled. Latest blood pressure and vitals reviewed-     Temp:  [97.8 °F (36.6 °C)-98 °F (36.7 °C)]   Pulse:  [65-68]   Resp:  [18-20]   BP: (107-109)/(52)   SpO2:  [95 %-99 %] .   Home meds for hypertension were reviewed and noted below.   Hypertension Medications               furosemide (LASIX) 40 MG tablet Take 1 tablet (40 mg total) by mouth 2 (two) times daily.    metoprolol succinate (TOPROL-XL) 100 MG 24 hr tablet Take 1 tablet (100 mg total) by mouth once daily.            While in the hospital, will manage blood pressure as follows; Continue home antihypertensive regimen    Will utilize p.r.n. blood pressure medication only if patient's blood pressure greater than 180/110 and she develops symptoms such as worsening chest pain or shortness of breath.

## 2024-07-12 NOTE — ED TRIAGE NOTES
Misa Barajas, a 78 y.o. female presents to the ED with positive blood cultures, pt was seen in [facility] ED yesterday and was notified of positive blood cultures today. Pt is asymptomatic, AAOx4, 2L NC at home.     Triage note:  Chief Complaint   Patient presents with    Abnormal Lab     Seen yest, on antibiotics, called today + blood cultures     Review of patient's allergies indicates:   Allergen Reactions    Adhesive tape-silicones     Adhesive Rash     Pt states she removed a LATEX bandaid and the skin beneath was swollen and red. No other latex causes a reaction.     Past Medical History:   Diagnosis Date    Acute hypoxemic respiratory failure 6/26/2021    Acute superficial venous thrombosis of lower extremity, bilateral 1/25/2022    Aortic atherosclerosis     Arthritis     knee joint pain    Asthma-COPD overlap syndrome 8/22/2022    Breast cancer 2002    left breast & lymph nodes-s/p sx with chemo    Bronchitis     with flu-2/2014    Cataracts, bilateral     Chronic anticoagulation 5/4/2022    Chronic diastolic heart failure 12/24/2019    Chronic kidney disease, stage 3     Class 1 obesity due to excess calories with serious comorbidity and body mass index (BMI) of 30.0 to 30.9 in adult 5/16/2024    History of chemotherapy     last treatment 12/2002 (had 8 treatments)    Hyperlipidemia 11/20/2016    Hypertension     Lymphedema of both lower extremities 1/25/2022    Nephrolithiasis 6/2/2014    Osteoporosis     Paroxysmal atrial fibrillation 6/7/2021    Renal calculi     hematuria    Renal osteodystrophy 1/14/2016    Venous stasis dermatitis of both lower extremities 1/25/2022    Vitamin D insufficiency

## 2024-07-12 NOTE — ASSESSMENT & PLAN NOTE
Patient with leucocytosis   Recent Labs   Lab 07/11/24  1000 07/12/24  1517   WBC 9.81 15.42*     . Afebrile. BCX 2 likely secondary to sepsis..

## 2024-07-12 NOTE — H&P
[facility] - Emergency Dept  Hospital Medicine  History & Physical    Patient Name: Misa Barajas  MRN: [id number]  Patient Class: IP- Inpatient  Admission Date: 7/12/2024  Attending Physician: Jarad Dueñas MD   Primary Care Provider: Celia Carlisle MD         Patient information was obtained from patient, past medical records, and ER records.     Subjective:     Principal Problem:Cellulitis    Chief Complaint:   Chief Complaint   Patient presents with    Abnormal Lab     Seen yest, on antibiotics, called today + blood cultures        HPI: Misa Barajas is a 78 y.o. female with a PMHx of HTN, HLD, HFpEF, COPD on home O2 (2L NC baseline O2 sat 92%) and pAF presents with  left sided painful erythematous rash which extends from her medial elbow to her fifth digit.     AAOX3. accompanied by daughters at the bedside. patient was seen at the ER on 7/12/24 and diagnosed with Erysipelas and discharged on cefadroxil outpatient. She e was called back after GNR's were seen on her 1/2 blood cultuers.  c/o  small bout of diarrhea . denies new symptoms. reports LUE  swelling started 7-9 days ago when she noticed her arm was swelling and leaking clear fluid - associated with elbow was red and tender.  reports that her cat scratched her near left wrist a couple of days ago. c/o  subjective fever.       ER course - X ray left elbow -diffuse soft tissue swelling identified. Visualized osseous structures appear unremarkable, with no evidence of recent or healing fracture, lytic destructive process, or appreciable arthritic change noted. No elbow joint effusion is demonstrated. No radiopaque soft tissue foreign body or abnormal gas collection within the soft tissues is appreciated. US LUE -No thrombus in central veins of the left upper extremity.  Repeat BCX 2 . started on IV Zosyn     last admission 6/27 - had successful RFA of the CTI for treatment of CTI-dependent atrial flutter. No evidence of intra- or  post-procedure complications. Post-ablation ECG shows NSR, and no acute abnormalities.               Review of patient's allergies indicates:   Allergen Reactions    Adhesive tape-silicones      Adhesive Rash       Pt states she removed a LATEX bandaid and the skin beneath was swollen and red. No other latex causes a reaction.           Past Medical History:   Diagnosis Date    Acute hypoxemic respiratory failure 6/26/2021    Acute superficial venous thrombosis of lower extremity, bilateral 1/25/2022    Aortic atherosclerosis      Arthritis       knee joint pain    Asthma-COPD overlap syndrome 8/22/2022    Breast cancer 2002     left breast & lymph nodes-s/p sx with chemo    Bronchitis       with flu-2/2014    Cataracts, bilateral      Chronic anticoagulation 5/4/2022    Chronic diastolic heart failure 12/24/2019    Chronic kidney disease, stage 3      Class 1 obesity due to excess calories with serious comorbidity and body mass index (BMI) of 30.0 to 30.9 in adult 5/16/2024    History of chemotherapy       last treatment 12/2002 (had 8 treatments)    Hyperlipidemia 11/20/2016    Hypertension      Lymphedema of both lower extremities 1/25/2022    Nephrolithiasis 6/2/2014    Osteoporosis      Paroxysmal atrial fibrillation 6/7/2021    Renal calculi       hematuria    Renal osteodystrophy 1/14/2016    Venous stasis dermatitis of both lower extremities 1/25/2022    Vitamin D insufficiency              Past Surgical History:   Procedure Laterality Date    ABLATION, ATRIAL FLUTTER, TYPICAL N/A 6/17/2024     Procedure: Ablation, Atrial Flutter, Typical;  Surgeon: Taz Church MD;  Location: [facility] EP LAB;  Service: Cardiology;  Laterality: N/A;  AFL, RFA, LORENE, MAKAYLA (cx if SR), anes, MB, 3 Prep    BREAST BIOPSY Left 2002     core bx, +    BREAST BIOPSY Right 2018     core    BREAST BIOPSY Right 2019    BREAST LUMPECTOMY Left 2002    CYSTOSCOPY   4/28/2022     Procedure: CYSTOSCOPY;  Surgeon: Vik Ware MD;   [facility];  Service: Urology;;    CYSTOSCOPY   5/30/2022     Procedure: CYSTOSCOPY;  Surgeon: Bonnie Knutson MD;  [facility];  Service: Urology;;    ECHOCARDIOGRAM,TRANSESOPHAGEAL N/A 6/17/2024     Procedure: Transesophageal echo (MAKAYLA) intra-procedure log documentation;  Surgeon: Nestor Martinez MD;  Location: [facility] EP LAB;  Service: Cardiology;  Laterality: N/A;    LASER LITHOTRIPSY   5/30/2022     Procedure: LITHOTRIPSY, USING LASER;  Surgeon: Bonnie Knutson MD;  [facility];  Service: Urology;;    LITHOTRIPSY        MASTECTOMY Left 06/2002     left-& lymph node dissection    REPLACEMENT OF STENT Right 5/30/2022     Procedure: REPLACEMENT, STENT;  Surgeon: Bonnie Knutson MD;  Location: [facility] OR [address] [place] [address];  Service: Urology;  Laterality: Right;    RETROGRADE PYELOGRAPHY Right 4/28/2022     Procedure: PYELOGRAM, RETROGRADE;  Surgeon: Vik Ware MD;  Location: [facility] OR [address] [place] [address];  Service: Urology;  Laterality: Right;    ROBOT-ASSISTED LAPAROSCOPIC PARTIAL NEPHRECTOMY USING DA JESÚS XI Right 3/11/2021     Procedure: XI ROBOTIC NEPHRECTOMY, PARTIAL;  Surgeon: Taz Ortega MD;  Location: [facility] OR 2ND FLR;  Service: Urology;  Laterality: Right;  4hr/ gen with regional  [place] confirmation:  [id number] for 11:15am case NC    URETEROSCOPIC REMOVAL OF URETERIC CALCULUS Right 5/30/2022     Procedure: REMOVAL, CALCULUS, URETER, URETEROSCOPIC;  Surgeon: Bonnie Knutson MD;  Location: [facility] OR [address] [place] [address];  Service: Urology;  Laterality: Right;    ureteroscopy Bilateral       6.14    URETEROSCOPY Right 5/30/2022     Procedure: URETEROSCOPY;  Surgeon: Bonnie Knutson MD;  Location: [facility] OR [address] [place] [address];  Service: Urology;  Laterality: Right;             Family History   Problem Relation Name Age of Onset    Bone cancer Mother Bibi      Breast cancer Mother Bibi      Arthritis Mother Bibi           Breast Cancer    Breast cancer Sister         Cancer Father Edaudi           Asbestos cancer    No Known Problems Brother        Fibroids Daughter        Crohn's disease Daughter        No Known Problems Daughter        Arthritis Sister             Breast Cancer    Anesthesia problems Neg Hx        Glaucoma Neg Hx                   Review of Systems:   Pain scale:   Constitutional:  fever,  chills, headache, vision loss, hearing loss, weight loss, Generalized weakness, falls, loss of smell, loss of taste, poor appetite,  sore throat  Respiratory: cough, shortness of breath.   Cardiovascular: chest pain, dizziness, palpitations, orthopnea, swelling of feet, syncope  Gastrointestinal: nausea, vomiting, abdominal pain, diarrhea, black stool,  blood in stool, change in bowel habits, constipation  Genitourinary: hematuria, dysuria, urgency, frequency  Integument/Breast: rash,  pruritis, erythema   Hematologic/Lymphatic: easy bruising, lymphadenopathy  Musculoskeletal: arthralgias , myalgias, back pain, neck pain, knee pain  Neurological: confusion, seizures, tremors, slurred speech  Behavioral/Psych:  depression, anxiety, auditory or visual hallucinations     OBJECTIVE:     Physical Exam:  Body mass index is 29.58 kg/m².    Constitutional: Appears well-developed and well-nourished.   Head: Normocephalic and atraumatic.   Neck: Normal range of motion. Neck supple.   Cardiovascular: Normal heart rate.  Regular heart rhythm.  Pulmonary/Chest: Effort normal.   Abdominal: No distension.  No tenderness  Musculoskeletal: Normal range of motion. erythematous rash over left upper extremity extending from hand to mid humerus with Induration, small scratch over the volar aspect of the left forearm  ++ edema -B/L LE and LUE   Neurological: Alert and oriented to person, place, and time.   Skin: Skin is warm and dry.   Psychiatric: Normal mood and affect. Behavior is normal.                  Vital Signs  Temp: 98 °F (36.7 °C) (07/12/24 1400)  Pulse: 73 (07/12/24 1600)  Resp:  "20 (07/12/24 1600)  BP: 134/63 (07/12/24 1600)  SpO2: 98 % (07/12/24 1600)     24 Hour VS Range    Temp:  [97.8 °F (36.6 °C)-98 °F (36.7 °C)]   Pulse:  [65-73]   Resp:  [18-20]   BP: (107-134)/(52-63)   SpO2:  [95 %-100 %]     Intake/Output Summary (Last 24 hours) at 7/12/2024 1728  Last data filed at 7/12/2024 1611  Gross per 24 hour   Intake 100 ml   Output --   Net 100 ml         I/O This Shift:  I/O this shift:  In: 100 [IV Piggyback:100]  Out: -     Wt Readings from Last 3 Encounters:   07/12/24 75.8 kg (167 lb)   07/11/24 75.8 kg (167 lb)   07/08/24 77.1 kg (169 lb 15.6 oz)       I have personally reviewed the vitals and recorded Intake/Output     Laboratory/Diagnostic Data:    CBC/Anemia Labs: Coags:    Recent Labs   Lab 07/11/24  1000 07/12/24  1523   WBC 9.81  --    HGB 11.6*  --    HCT 37.0 40     --    MCV 93  --    RDW 13.8  --     No results for input(s): "PT", "INR", "APTT" in the last 168 hours.     Chemistries: ABG:   Recent Labs   Lab 07/11/24  1000      K 3.3*      CO2 28   BUN 18   CREATININE 1.3   CALCIUM 10.0   PROT 6.0   BILITOT 1.0   ALKPHOS 95   ALT 24   AST 38    No results for input(s): "PH", "PCO2", "PO2", "HCO3", "POCSATURATED", "BE" in the last 168 hours.     POCT Glucose: HbA1c:    No results for input(s): "POCTGLUCOSE" in the last 168 hours. Hemoglobin A1C   Date Value Ref Range Status   05/08/2024 5.3 4.0 - 5.6 % Final     Comment:     ADA Screening Guidelines:  5.7-6.4%  Consistent with prediabetes  >or=6.5%  Consistent with diabetes    High levels of fetal hemoglobin interfere with the HbA1C  assay. Heterozygous hemoglobin variants (HbS, HgC, etc)do  not significantly interfere with this assay.   However, presence of multiple variants may affect accuracy.     03/01/2024 5.4 4.0 - 5.6 % Final     Comment:     ADA Screening Guidelines:  5.7-6.4%  Consistent with prediabetes  >or=6.5%  Consistent with diabetes    High levels of fetal hemoglobin interfere with the " "HbA1C  assay. Heterozygous hemoglobin variants (HbS, HgC, etc)do  not significantly interfere with this assay.   However, presence of multiple variants may affect accuracy.     08/17/2022 5.4 4.0 - 5.6 % Final     Comment:     ADA Screening Guidelines:  5.7-6.4%  Consistent with prediabetes  >or=6.5%  Consistent with diabetes    High levels of fetal hemoglobin interfere with the HbA1C  assay. Heterozygous hemoglobin variants (HbS, HgC, etc)do  not significantly interfere with this assay.   However, presence of multiple variants may affect accuracy.          Cardiac Enzymes: Ejection Fractions:    No results for input(s): "CPK", "CPKMB", "MB", "TROPONINI" in the last 72 hours. EF   Date Value Ref Range Status   05/08/2024 55 % Final   05/04/2022 60 % Final          No results for input(s): "COLORU", "APPEARANCEUA", "PHUR", "SPECGRAV", "PROTEINUA", "GLUCUA", "KETONESU", "BILIRUBINUA", "OCCULTUA", "NITRITE", "UROBILINOGEN", "LEUKOCYTESUR", "RBCUA", "WBCUA", "BACTERIA", "SQUAMEPITHEL", "HYALINECASTS" in the last 48 hours.    Invalid input(s): "WRIGHTSUR"    Lactate (Lactic Acid) (mmol/L)   Date Value   07/11/2024 1.8   05/21/2024 2.0   05/07/2024 1.2   04/28/2022 1.2   06/25/2021 2.1     BNP (pg/mL)   Date Value   05/07/2024 1,165 (H)   03/01/2024 1,261 (H)   05/12/2022 422 (H)   06/25/2021 178 (H)   06/23/2021 599 (H)     CRP (mg/L)   Date Value   04/09/2021 0.4   08/09/2010 0.7     Sed Rate   Date Value   04/09/2021 <2 mm/Hr   08/09/2010 26 mm/hr (H)     No results found for: "DDIMER"  Ferritin (ng/mL)   Date Value   06/23/2016 98     No results found for: "LDH"  Troponin I (ng/mL)   Date Value   05/08/2024 0.028 (H)   05/08/2024 0.031 (H)   05/07/2024 0.032 (H)   03/01/2024 0.020   05/12/2022 0.028 (H)   05/12/2022 0.029 (H)   05/12/2022 0.030 (H)   05/03/2022 0.030 (H)     Results for orders placed or performed in visit on 02/15/23   Vitamin D   Result Value Ref Range    Vit D, 25-Hydroxy 50 30 - 96 ng/mL   Results " for orders placed or performed in visit on 05/04/20   Vitamin D   Result Value Ref Range    Vit D, 25-Hydroxy 42 30 - 96 ng/mL   Results for orders placed or performed in visit on 08/22/19   Vitamin D   Result Value Ref Range    Vit D, 25-Hydroxy 44 30 - 96 ng/mL     SARS-CoV2 (COVID-19) Qualitative PCR (no units)   Date Value   08/03/2021 Not Detected   03/08/2021 Not Detected     POC Rapid COVID (no units)   Date Value   04/28/2022 Negative   06/23/2021 Negative       Microbiology labs for the last week  Microbiology Results (last 7 days)       Procedure Component Value Units Date/Time    Blood culture #1 **CANNOT BE ORDERED STAT** [[id number]] Collected: 07/12/24 1517    Order Status: Sent Specimen: Blood from Peripheral, Forearm, Right     Blood culture #2 **CANNOT BE ORDERED STAT** [[id number]] Collected: 07/12/24 1516    Order Status: Sent Specimen: Blood from Peripheral, Antecubital, Right             Reviewed and noted in plan where applicable- Please see chart for full lab data.    Lines/Drains:       Peripheral IV - Single Lumen 07/12/24 1511 22 G Anterior;Right Shoulder (Active)   Number of days: 0       Imaging      Results for orders placed during the hospital encounter of 05/07/24    Echo    Interpretation Summary    Left Ventricle: The left ventricle is normal in size. Ventricular mass is normal. Normal wall thickness. Normal wall motion. There is normal systolic function. Ejection fraction by visual approximation is 55%. Unable to assess diastolic function due to atrial fibrillation.    Right Ventricle: Normal right ventricular cavity size. Wall thickness is normal. Systolic function is mildly reduced.    Aortic Valve: The aortic valve is a trileaflet valve. There is moderate aortic valve sclerosis. There is annular calcification present. Mildly restricted motion. No stenosis.    Tricuspid Valve: There is mild to moderate regurgitation.    Pulmonary Artery: The estimated pulmonary artery systolic  pressure is 44 mmHg.    IVC/SVC: Elevated venous pressure at 15 mmHg.      US Upper Extremity Veins Left  Narrative: EXAMINATION:  US UPPER EXTREMITY VEINS LEFT    CLINICAL HISTORY:  left arm swelling;    TECHNIQUE:  Duplex and color flow Doppler evaluation and dynamic compression was performed of the left upper extremity veins.    COMPARISON:  None    FINDINGS:  Central veins: The internal jugular, subclavian, and axillary veins are patent and free of thrombus.    Arm veins: The brachial, basilic, and cephalic veins are patent and compressible.    Contralateral subclavian/internal jugular veins: The right subclavian and internal jugular veins are patent and free of thrombus.    Other findings: None.  Impression: No thrombus in central veins of the left upper extremity.    Electronically signed by: Nav Rogers MD  Date:    07/11/2024  Time:    13:39  X-Ray Elbow Complete Left  Narrative: EXAMINATION:  XR ELBOW COMPLETE 3 VIEW LEFT    CLINICAL HISTORY:  left arm swelling;    TECHNIQUE:  Three views of the left elbow were obtained, with AP, lateral, and oblique projections submitted.    COMPARISON:  No relevant comparison examinations are currently available.  Clinical information obtained from the electronic medical record indicates left arm swelling, with no recent trauma history.    FINDINGS:  There is diffuse soft tissue swelling identified.  Visualized osseous structures appear unremarkable, with no evidence of recent or healing fracture, lytic destructive process, or appreciable arthritic change noted.  No elbow joint effusion is demonstrated.  No radiopaque soft tissue foreign body or abnormal gas collection within the soft tissues is appreciated.  Impression: As above    Electronically signed by: Lobito Guillen MD  Date:    07/11/2024  Time:    12:38    EKG - Sinus rhythm @ 65 bpm with 1st degree A-V block Right bundle branch block Left anterior fascicular block    Labs, Imaging, EKG and Diagnostic results from  7/12/2024 were reviewed.    Medications:  Medication list was reviewed and changes noted under Assessment/Plan.  No current facility-administered medications on file prior to encounter.     Current Outpatient Medications on File Prior to Encounter   Medication Sig Dispense Refill    acetaminophen (TYLENOL) 500 MG tablet Take 2 tablets (1,000 mg total) by mouth every 8 (eight) hours as needed for Pain. 42 tablet 0    albuterol (PROVENTIL HFA) 90 mcg/actuation inhaler Inhale 2 puffs into the lungs every 6 (six) hours as needed for Wheezing. Rescue 18 g 3    apixaban (ELIQUIS) 2.5 mg Tab Take 1 tablet (2.5 mg total) by mouth 2 (two) times a day. 60 tablet 11    aspirin (ECOTRIN) 81 MG EC tablet Take 81 mg by mouth once daily.      biotin 1 mg tablet Take 1,000 mcg by mouth once daily.      budesonide-glycopyr-formoterol (BREZTRI AEROSPHERE) 160-9-4.8 mcg/actuation HFAA Inhale 2 puffs into the lungs 2 (two) times daily. 32.1 g 3    busPIRone (BUSPAR) 5 MG Tab Take 5 mg by mouth as needed.      cefadroxil (DURICEF) 500 MG Cap Take 2 capsules (1,000 mg total) by mouth once daily. for 7 days 14 capsule 0    fluticasone propionate (FLONASE) 50 mcg/actuation nasal spray 1 spray by Each Nostril route daily as needed (congestion).      furosemide (LASIX) 40 MG tablet Take 1 tablet (40 mg total) by mouth 2 (two) times daily. 180 tablet 3    inhalation spacing device Use as directed for inhalation. 1 Device 0    metoprolol succinate (TOPROL-XL) 100 MG 24 hr tablet Take 1 tablet (100 mg total) by mouth once daily. 90 tablet 3    rosuvastatin (CRESTOR) 5 MG tablet Take 1 tablet (5 mg total) by mouth once daily. (Patient not taking: Reported on 7/10/2024) 90 tablet 3    tamsulosin (FLOMAX) 0.4 mg Cap Take 1 capsule (0.4 mg total) by mouth once daily. (Patient not taking: Reported on 7/10/2024) 30 capsule 11    vitamin D (VITAMIN D3) 1000 units Tab Take 2 tablets (2,000 Units total) by mouth once daily. 180 tablet 3     Scheduled  Medications:  Current Facility-Administered Medications   Medication Dose Route Frequency    apixaban  2.5 mg Oral BID    [START ON 7/13/2024] aspirin  81 mg Oral Daily    [START ON 7/13/2024] atorvastatin  20 mg Oral Daily    furosemide  40 mg Oral BID    [START ON 7/13/2024] metoprolol succinate  100 mg Oral Daily    piperacillin-tazobactam (Zosyn) IV (PEDS and ADULTS) (extended infusion is not appropriate)  4.5 g Intravenous Q8H    [START ON 7/13/2024] vitamin D  2,000 Units Oral Daily     PRN:   Current Facility-Administered Medications:     albuterol, 2 puff, Inhalation, Q6H PRN    dextrose 10%, 12.5 g, Intravenous, PRN    dextrose 10%, 25 g, Intravenous, PRN    glucagon (human recombinant), 1 mg, Intramuscular, PRN    glucose, 16 g, Oral, PRN    glucose, 24 g, Oral, PRN    naloxone, 0.02 mg, Intravenous, PRN    sodium chloride 0.9%, 10 mL, Intravenous, Q12H PRN  Infusions:   Estimated Creatinine Clearance: 33.7 mL/min (based on SCr of 1.3 mg/dL).           Assessment/Plan:     * Cellulitis  presents with  left sided painful erythematous rash which extends from her medial elbow to her fifth digit.  was seen at the ER on 7/12/24 and diagnosed with Erysipelas and discharged on antibiotics outpatient. She e was called back after GNR's were seen on her 1/2 blood cultuers     ER course - X ray left elbow -diffuse soft tissue swelling identified. Visualized osseous structures appear unremarkable, with no evidence of recent or healing fracture, lytic destructive process, or appreciable arthritic change noted. No elbow joint effusion is demonstrated. No radiopaque soft tissue foreign body or abnormal gas collection within the soft tissues is appreciated. US LUE -No thrombus in central veins of the left upper extremity.  Repeat BCX 2 . started on IV Zosyn     ID evaluation     Anemia    Patient's with Normocytic anemia.. Hemoglobin stable. Etiology likely due to chronic disease .  Current CBC reviewed-    Recent Labs    Lab 07/11/24  1000   HGB 11.6*         Component Value Date/Time    MCV 93 07/11/2024 1000    RDW 13.8 07/11/2024 1000    IRON 115 06/23/2016 0947    FERRITIN 98 06/23/2016 0947     Monitor CBC and transfuse if H/H drops below 7/21.      Gram-negative bacteremia  GNR's were seen on her 1/2 blood cultures on 7/11. repeat BC x2         Diastolic CHF, chronic  Patient is identified as having Diastolic (HFpEF) heart failure that is Chronic. CHF is currently controlled. Latest ECHO performed and demonstrates- Results for orders placed during the hospital encounter of 05/07/24    Echo    Interpretation Summary    Left Ventricle: The left ventricle is normal in size. Ventricular mass is normal. Normal wall thickness. Normal wall motion. There is normal systolic function. Ejection fraction by visual approximation is 55%. Unable to assess diastolic function due to atrial fibrillation.    Right Ventricle: Normal right ventricular cavity size. Wall thickness is normal. Systolic function is mildly reduced.    Aortic Valve: The aortic valve is a trileaflet valve. There is moderate aortic valve sclerosis. There is annular calcification present. Mildly restricted motion. No stenosis.    Tricuspid Valve: There is mild to moderate regurgitation.    Pulmonary Artery: The estimated pulmonary artery systolic pressure is 44 mmHg.    IVC/SVC: Elevated venous pressure at 15 mmHg.    Chronic anticoagulation  continue eliquis       Chronic respiratory failure with hypoxia  HFpEF, COPD on home O2  on 2L NC baseline     Hyperlipidemia  continue statin       Paroxysmal atrial fibrillation  continue metoprolol and eliquis. last admission 6/27 - had successful RFA of the CTI for treatment of CTI-dependent atrial flutter. No evidence of intra- or post-procedure complications. Post-ablation ECG shows NSR, and no acute abnormalities.     CKD (chronic kidney disease) stage 3, GFR 30-59 ml/min  Creatine stable for now. BMP reviewed- noted Estimated  Creatinine Clearance: 33.7 mL/min (based on SCr of 1.3 mg/dL). according to latest data. Monitor UOP and serial BMP and adjust therapy as needed. Renally dose meds.    Recent Labs   Lab 07/11/24  1000   BUN 18   CREATININE 1.3       I & O     Intake/Output Summary (Last 24 hours) at 7/12/2024 1640  Last data filed at 7/12/2024 1611  Gross per 24 hour   Intake 100 ml   Output --   Net 100 ml        Essential hypertension  Chronic, controlled. Latest blood pressure and vitals reviewed-     Temp:  [97.8 °F (36.6 °C)-98 °F (36.7 °C)]   Pulse:  [65-68]   Resp:  [18-20]   BP: (107-109)/(52)   SpO2:  [95 %-99 %] .   Home meds for hypertension were reviewed and noted below.   Hypertension Medications               furosemide (LASIX) 40 MG tablet Take 1 tablet (40 mg total) by mouth 2 (two) times daily.    metoprolol succinate (TOPROL-XL) 100 MG 24 hr tablet Take 1 tablet (100 mg total) by mouth once daily.            While in the hospital, will manage blood pressure as follows; Continue home antihypertensive regimen    Will utilize p.r.n. blood pressure medication only if patient's blood pressure greater than 180/110 and she develops symptoms such as worsening chest pain or shortness of breath.      VTE Risk Mitigation (From admission, onward)           Ordered     apixaban tablet 2.5 mg  2 times daily         07/12/24 8423                                    Jarad Dueñas MD  Department of Hospital Medicine  [facility] - Emergency Dept

## 2024-07-12 NOTE — HPI
Misa Barajsa is a 78 y.o. female with a PMHx of HTN, HLD, HFpEF, COPD on home O2 (2L NC baseline O2 sat 92%) and pAF presents with  left sided painful erythematous rash which extends from her medial elbow to her fifth digit.     AAOX3. accompanied by daughters at the bedside. patient was seen at the ER on 7/12/24 and diagnosed with Erysipelas and discharged on cefadroxil outpatient. She e was called back after GNR's were seen on her 1/2 blood cultuers.  c/o  small bout of diarrhea . denies new symptoms. reports LUE  swelling started 7-9 days ago when she noticed her arm was swelling and leaking clear fluid - associated with elbow was red and tender.  reports that her cat scratched her near left wrist a couple of days ago. c/o  subjective fever.       ER course - X ray left elbow -diffuse soft tissue swelling identified. Visualized osseous structures appear unremarkable, with no evidence of recent or healing fracture, lytic destructive process, or appreciable arthritic change noted. No elbow joint effusion is demonstrated. No radiopaque soft tissue foreign body or abnormal gas collection within the soft tissues is appreciated. US LUE -No thrombus in central veins of the left upper extremity.  Repeat BCX 2 . started on IV Zosyn     last admission 6/27 - had successful RFA of the CTI for treatment of CTI-dependent atrial flutter. No evidence of intra- or post-procedure complications. Post-ablation ECG shows NSR, and no acute abnormalities.

## 2024-07-12 NOTE — ASSESSMENT & PLAN NOTE
presents with  left sided painful erythematous rash which extends from her medial elbow to her fifth digit.  was seen at the ER on 7/12/24 and diagnosed with Erysipelas and discharged on antibiotics outpatient. She e was called back after GNR's were seen on her 1/2 blood cultuers     ER course - X ray left elbow -diffuse soft tissue swelling identified. Visualized osseous structures appear unremarkable, with no evidence of recent or healing fracture, lytic destructive process, or appreciable arthritic change noted. No elbow joint effusion is demonstrated. No radiopaque soft tissue foreign body or abnormal gas collection within the soft tissues is appreciated. US LUE -No thrombus in central veins of the left upper extremity.  Repeat BCX 2 . started on IV Zosyn     ID evaluation

## 2024-07-13 LAB
ALBUMIN SERPL BCP-MCNC: 2.1 G/DL (ref 3.5–5.2)
ALP SERPL-CCNC: 92 U/L (ref 55–135)
ALT SERPL W/O P-5'-P-CCNC: 18 U/L (ref 10–44)
ANION GAP SERPL CALC-SCNC: 8 MMOL/L (ref 8–16)
AST SERPL-CCNC: 29 U/L (ref 10–40)
BASOPHILS # BLD AUTO: 0.03 K/UL (ref 0–0.2)
BASOPHILS # BLD AUTO: 0.05 K/UL (ref 0–0.2)
BASOPHILS NFR BLD: 0.3 % (ref 0–1.9)
BASOPHILS NFR BLD: 0.4 % (ref 0–1.9)
BILIRUB SERPL-MCNC: 1.4 MG/DL (ref 0.1–1)
BUN SERPL-MCNC: 24 MG/DL (ref 8–23)
CALCIUM SERPL-MCNC: 9.1 MG/DL (ref 8.7–10.5)
CHLORIDE SERPL-SCNC: 106 MMOL/L (ref 95–110)
CO2 SERPL-SCNC: 23 MMOL/L (ref 23–29)
CREAT SERPL-MCNC: 1.3 MG/DL (ref 0.5–1.4)
DIFFERENTIAL METHOD BLD: ABNORMAL
DIFFERENTIAL METHOD BLD: ABNORMAL
EOSINOPHIL # BLD AUTO: 0.2 K/UL (ref 0–0.5)
EOSINOPHIL # BLD AUTO: 0.2 K/UL (ref 0–0.5)
EOSINOPHIL NFR BLD: 1.4 % (ref 0–8)
EOSINOPHIL NFR BLD: 1.7 % (ref 0–8)
ERYTHROCYTE [DISTWIDTH] IN BLOOD BY AUTOMATED COUNT: 14 % (ref 11.5–14.5)
ERYTHROCYTE [DISTWIDTH] IN BLOOD BY AUTOMATED COUNT: 14.4 % (ref 11.5–14.5)
EST. GFR  (NO RACE VARIABLE): 41.6 ML/MIN/1.73 M^2
GLUCOSE SERPL-MCNC: 69 MG/DL (ref 70–110)
HCT VFR BLD AUTO: 37.3 % (ref 37–48.5)
HCT VFR BLD AUTO: 38 % (ref 37–48.5)
HGB BLD-MCNC: 11.4 G/DL (ref 12–16)
HGB BLD-MCNC: 11.7 G/DL (ref 12–16)
IMM GRANULOCYTES # BLD AUTO: 0.05 K/UL (ref 0–0.04)
IMM GRANULOCYTES # BLD AUTO: 0.13 K/UL (ref 0–0.04)
IMM GRANULOCYTES NFR BLD AUTO: 0.5 % (ref 0–0.5)
IMM GRANULOCYTES NFR BLD AUTO: 1.2 % (ref 0–0.5)
LYMPHOCYTES # BLD AUTO: 1.3 K/UL (ref 1–4.8)
LYMPHOCYTES # BLD AUTO: 1.4 K/UL (ref 1–4.8)
LYMPHOCYTES NFR BLD: 12.3 % (ref 18–48)
LYMPHOCYTES NFR BLD: 12.9 % (ref 18–48)
MAGNESIUM SERPL-MCNC: 1.9 MG/DL (ref 1.6–2.6)
MCH RBC QN AUTO: 28.8 PG (ref 27–31)
MCH RBC QN AUTO: 29.5 PG (ref 27–31)
MCHC RBC AUTO-ENTMCNC: 30.6 G/DL (ref 32–36)
MCHC RBC AUTO-ENTMCNC: 30.8 G/DL (ref 32–36)
MCV RBC AUTO: 94 FL (ref 82–98)
MCV RBC AUTO: 96 FL (ref 82–98)
MONOCYTES # BLD AUTO: 1 K/UL (ref 0.3–1)
MONOCYTES # BLD AUTO: 1.1 K/UL (ref 0.3–1)
MONOCYTES NFR BLD: 10 % (ref 4–15)
MONOCYTES NFR BLD: 9.6 % (ref 4–15)
NEUTROPHILS # BLD AUTO: 7.7 K/UL (ref 1.8–7.7)
NEUTROPHILS # BLD AUTO: 8.4 K/UL (ref 1.8–7.7)
NEUTROPHILS NFR BLD: 74.6 % (ref 38–73)
NEUTROPHILS NFR BLD: 75.1 % (ref 38–73)
NRBC BLD-RTO: 0 /100 WBC
NRBC BLD-RTO: 0 /100 WBC
OHS QRS DURATION: 152 MS
OHS QTC CALCULATION: 472 MS
PHOSPHATE SERPL-MCNC: 2.7 MG/DL (ref 2.7–4.5)
PLATELET # BLD AUTO: 163 K/UL (ref 150–450)
PLATELET # BLD AUTO: 176 K/UL (ref 150–450)
PLATELET BLD QL SMEAR: ABNORMAL
PMV BLD AUTO: 10.5 FL (ref 9.2–12.9)
PMV BLD AUTO: 10.5 FL (ref 9.2–12.9)
POTASSIUM SERPL-SCNC: 3.4 MMOL/L (ref 3.5–5.1)
PROT SERPL-MCNC: 5.3 G/DL (ref 6–8.4)
RBC # BLD AUTO: 3.96 M/UL (ref 4–5.4)
RBC # BLD AUTO: 3.96 M/UL (ref 4–5.4)
SODIUM SERPL-SCNC: 137 MMOL/L (ref 136–145)
WBC # BLD AUTO: 10.25 K/UL (ref 3.9–12.7)
WBC # BLD AUTO: 11.15 K/UL (ref 3.9–12.7)

## 2024-07-13 PROCEDURE — 36415 COLL VENOUS BLD VENIPUNCTURE: CPT | Performed by: HOSPITALIST

## 2024-07-13 PROCEDURE — 99223 1ST HOSP IP/OBS HIGH 75: CPT | Mod: ,,, | Performed by: NURSE PRACTITIONER

## 2024-07-13 PROCEDURE — 11000001 HC ACUTE MED/SURG PRIVATE ROOM

## 2024-07-13 PROCEDURE — 63600175 PHARM REV CODE 636 W HCPCS: Performed by: HOSPITALIST

## 2024-07-13 PROCEDURE — 63600175 PHARM REV CODE 636 W HCPCS: Performed by: NURSE PRACTITIONER

## 2024-07-13 PROCEDURE — 80053 COMPREHEN METABOLIC PANEL: CPT | Performed by: HOSPITALIST

## 2024-07-13 PROCEDURE — 25000003 PHARM REV CODE 250: Performed by: NURSE PRACTITIONER

## 2024-07-13 PROCEDURE — 83735 ASSAY OF MAGNESIUM: CPT | Performed by: HOSPITALIST

## 2024-07-13 PROCEDURE — 84100 ASSAY OF PHOSPHORUS: CPT | Performed by: HOSPITALIST

## 2024-07-13 PROCEDURE — 25000003 PHARM REV CODE 250: Performed by: INTERNAL MEDICINE

## 2024-07-13 PROCEDURE — 25000003 PHARM REV CODE 250: Performed by: HOSPITALIST

## 2024-07-13 PROCEDURE — 21400001 HC TELEMETRY ROOM

## 2024-07-13 PROCEDURE — 85025 COMPLETE CBC W/AUTO DIFF WBC: CPT | Performed by: HOSPITALIST

## 2024-07-13 RX ORDER — ACETAMINOPHEN 325 MG/1
650 TABLET ORAL EVERY 6 HOURS PRN
Status: DISCONTINUED | OUTPATIENT
Start: 2024-07-13 | End: 2024-07-18 | Stop reason: HOSPADM

## 2024-07-13 RX ORDER — POTASSIUM CHLORIDE 20 MEQ/1
40 TABLET, EXTENDED RELEASE ORAL ONCE
Status: COMPLETED | OUTPATIENT
Start: 2024-07-13 | End: 2024-07-13

## 2024-07-13 RX ORDER — FUROSEMIDE 20 MG/1
20 TABLET ORAL ONCE
Status: COMPLETED | OUTPATIENT
Start: 2024-07-13 | End: 2024-07-13

## 2024-07-13 RX ADMIN — APIXABAN 2.5 MG: 2.5 TABLET, FILM COATED ORAL at 09:07

## 2024-07-13 RX ADMIN — AMPICILLIN SODIUM AND SULBACTAM SODIUM 3 G: 2; 1 INJECTION, POWDER, FOR SOLUTION INTRAMUSCULAR; INTRAVENOUS at 04:07

## 2024-07-13 RX ADMIN — CHOLECALCIFEROL TAB 25 MCG (1000 UNIT) 2000 UNITS: 25 TAB at 09:07

## 2024-07-13 RX ADMIN — POTASSIUM CHLORIDE 40 MEQ: 1500 TABLET, EXTENDED RELEASE ORAL at 09:07

## 2024-07-13 RX ADMIN — ACETAMINOPHEN 650 MG: 325 TABLET ORAL at 10:07

## 2024-07-13 RX ADMIN — AMPICILLIN SODIUM AND SULBACTAM SODIUM 3 G: 2; 1 INJECTION, POWDER, FOR SOLUTION INTRAMUSCULAR; INTRAVENOUS at 09:07

## 2024-07-13 RX ADMIN — DOXYCYCLINE HYCLATE 100 MG: 100 TABLET, COATED ORAL at 09:07

## 2024-07-13 RX ADMIN — PIPERACILLIN SODIUM AND TAZOBACTAM SODIUM 4.5 G: 4; .5 INJECTION, POWDER, FOR SOLUTION INTRAVENOUS at 09:07

## 2024-07-13 RX ADMIN — PIPERACILLIN SODIUM AND TAZOBACTAM SODIUM 4.5 G: 4; .5 INJECTION, POWDER, FOR SOLUTION INTRAVENOUS at 12:07

## 2024-07-13 RX ADMIN — DOXYCYCLINE HYCLATE 100 MG: 100 TABLET, COATED ORAL at 08:07

## 2024-07-13 RX ADMIN — FUROSEMIDE 20 MG: 20 TABLET ORAL at 08:07

## 2024-07-13 RX ADMIN — METOPROLOL SUCCINATE 100 MG: 50 TABLET, EXTENDED RELEASE ORAL at 09:07

## 2024-07-13 RX ADMIN — ASPIRIN 81 MG: 81 TABLET, COATED ORAL at 09:07

## 2024-07-13 RX ADMIN — FUROSEMIDE 40 MG: 40 TABLET ORAL at 09:07

## 2024-07-13 RX ADMIN — ATORVASTATIN CALCIUM 20 MG: 20 TABLET, FILM COATED ORAL at 09:07

## 2024-07-13 RX ADMIN — APIXABAN 2.5 MG: 2.5 TABLET, FILM COATED ORAL at 08:07

## 2024-07-13 NOTE — HOSPITAL COURSE
7/13 K replaced. Nutrition consulted for low albumin. LE stockings and elevate LUE. lasix 40mg BID PRN. switched to unasyn   7/14 BC 7/11 with Pasteurella multocida . BCX 2 7/12 lost. repeat BCX 2 today . Cr 1.2-->1.6. hold lasix for now  7/15 LE erythema and swelling improved. Nutritin evaluation . continue boost . Cr 1.4. continue PO lasix   7/16 Awaiting sensitivities from BC.   7/17 BCx2 7/14. NGTD.  Unasyn discontinued. ID recs ceftriaxone 2g IV q24 while awaiting micro sensitivities.anticipate 14d from cleared culture( end date 7/26/24). ID to follow up cultures.     7/18 discharge with home health

## 2024-07-13 NOTE — PROGRESS NOTES
[facility] ([address] Medicine  Progress Note    Patient Name: Misa Barajas  MRN: [id number]  Patient Class: IP- Inpatient   Admission Date: 7/12/2024  Length of Stay: 1 days  Attending Physician: Jarad Dueñas MD  Primary Care Provider: Celia Carlisle MD        Subjective:     Principal Problem:Cellulitis        HPI:  Misa Barajas is a 78 y.o. female with a PMHx of HTN, HLD, HFpEF, COPD on home O2 (2L NC baseline O2 sat 92%) and pAF presents with  left sided painful erythematous rash which extends from her medial elbow to her fifth digit.     AAOX3. accompanied by daughters at the bedside. patient was seen at the ER on 7/12/24 and diagnosed with Erysipelas and discharged on cefadroxil outpatient. She e was called back after GNR's were seen on her 1/2 blood cultuers.  c/o  small bout of diarrhea . denies new symptoms. reports LUE  swelling started 7-9 days ago when she noticed her arm was swelling and leaking clear fluid - associated with elbow was red and tender.  reports that her cat scratched her near left wrist a couple of days ago. c/o  subjective fever.       ER course - X ray left elbow -diffuse soft tissue swelling identified. Visualized osseous structures appear unremarkable, with no evidence of recent or healing fracture, lytic destructive process, or appreciable arthritic change noted. No elbow joint effusion is demonstrated. No radiopaque soft tissue foreign body or abnormal gas collection within the soft tissues is appreciated. US LUE -No thrombus in central veins of the left upper extremity.  Repeat BCX 2 . started on IV Zosyn     last admission 6/27 - had successful RFA of the CTI for treatment of CTI-dependent atrial flutter. No evidence of intra- or post-procedure complications. Post-ablation ECG shows NSR, and no acute abnormalities.       Overview/Hospital Course:  7/13 K replaced. Nutrition consulted for low albumin. LE stockings and elevate LUE. lasix  40mg BID PRN. switched to unasyn         Review of Systems:   Pain scale:    Constitutional:  fever,  chills, headache, vision loss, hearing loss, weight loss, Generalized weakness, falls, loss of smell, loss of taste, poor appetite,  sore throat  Respiratory: cough, shortness of breath.   Cardiovascular: chest pain, dizziness, palpitations, orthopnea, swelling of feet, syncope  Gastrointestinal: nausea, vomiting, abdominal pain, diarrhea, black stool,  blood in stool, change in bowel habits, constipation  Genitourinary: hematuria, dysuria, urgency, frequency  Integument/Breast: rash,  pruritis, erythema - improved   Hematologic/Lymphatic: easy bruising, lymphadenopathy  Musculoskeletal: arthralgias , myalgias, back pain, neck pain, knee pain  Neurological: confusion, seizures, tremors, slurred speech  Behavioral/Psych:  depression, anxiety, auditory or visual hallucinations     OBJECTIVE:     Physical Exam:  Body mass index is 29.58 kg/m².    Constitutional: Appears well-developed and well-nourished.   Head: Normocephalic and atraumatic.   Neck: Normal range of motion. Neck supple.   Cardiovascular: Normal heart rate.  Regular heart rhythm.  Pulmonary/Chest: Effort normal.   Abdominal: No distension.  No tenderness  Musculoskeletal: Normal range of motion.  erythematous rash over left upper extremity extending from hand to mid humerus with Induration, small scratch over the volar aspect of the left forearm  ++ edema -B/L LE and LUE   Neurological: Alert and oriented to person, place, and time.   Skin: Skin is warm and dry.   Psychiatric: Normal mood and affect. Behavior is normal.       Vital Signs  Temp: 97.5 °F (36.4 °C) (07/13/24 1718)  Pulse: 65 (07/13/24 1718)  Resp: 18 (07/13/24 1718)  BP: 133/61 (07/13/24 1718)  SpO2: 95 % (07/13/24 1718)     24 Hour VS Range    Temp:  [97.5 °F (36.4 °C)-98.1 °F (36.7 °C)]   Pulse:  [63-71]   Resp:  [18-22]   BP: (133-148)/(55-72)   SpO2:  [95 %-100 %]   No intake or output  "data in the 24 hours ending 07/13/24 1818        I/O This Shift:  No intake/output data recorded.    Wt Readings from Last 3 Encounters:   07/12/24 75.8 kg (167 lb)   07/11/24 75.8 kg (167 lb)   07/08/24 77.1 kg (169 lb 15.6 oz)       I have personally reviewed the vitals and recorded Intake/Output     Laboratory/Diagnostic Data:    CBC/Anemia Labs: Coags:    Recent Labs   Lab 07/12/24  1517 07/12/24  1523 07/13/24  0519 07/13/24  1118   WBC 15.42*  --  11.15 10.25   HGB 12.7  --  11.7* 11.4*   HCT 40.4 40 38.0 37.3     --  163 176   MCV 94  --  96 94   RDW 14.1  --  14.4 14.0    No results for input(s): "PT", "INR", "APTT" in the last 168 hours.     Chemistries: ABG:   Recent Labs   Lab 07/11/24  1000 07/12/24  1517 07/13/24  0519    141 137   K 3.3* 3.9 3.4*    104 106   CO2 28 24 23   BUN 18 23 24*   CREATININE 1.3 1.2 1.3   CALCIUM 10.0 10.5 9.1   PROT 6.0 7.0 5.3*   BILITOT 1.0 1.6* 1.4*   ALKPHOS 95 125 92   ALT 24 25 18   AST 38 53* 29   MG  --   --  1.9   PHOS  --   --  2.7    No results for input(s): "PH", "PCO2", "PO2", "HCO3", "POCSATURATED", "BE" in the last 168 hours.     POCT Glucose: HbA1c:    No results for input(s): "POCTGLUCOSE" in the last 168 hours. Hemoglobin A1C   Date Value Ref Range Status   05/08/2024 5.3 4.0 - 5.6 % Final     Comment:     ADA Screening Guidelines:  5.7-6.4%  Consistent with prediabetes  >or=6.5%  Consistent with diabetes    High levels of fetal hemoglobin interfere with the HbA1C  assay. Heterozygous hemoglobin variants (HbS, HgC, etc)do  not significantly interfere with this assay.   However, presence of multiple variants may affect accuracy.     03/01/2024 5.4 4.0 - 5.6 % Final     Comment:     ADA Screening Guidelines:  5.7-6.4%  Consistent with prediabetes  >or=6.5%  Consistent with diabetes    High levels of fetal hemoglobin interfere with the HbA1C  assay. Heterozygous hemoglobin variants (HbS, HgC, etc)do  not significantly interfere with this " "assay.   However, presence of multiple variants may affect accuracy.     08/17/2022 5.4 4.0 - 5.6 % Final     Comment:     ADA Screening Guidelines:  5.7-6.4%  Consistent with prediabetes  >or=6.5%  Consistent with diabetes    High levels of fetal hemoglobin interfere with the HbA1C  assay. Heterozygous hemoglobin variants (HbS, HgC, etc)do  not significantly interfere with this assay.   However, presence of multiple variants may affect accuracy.          Cardiac Enzymes: Ejection Fractions:    No results for input(s): "CPK", "CPKMB", "MB", "TROPONINI" in the last 72 hours. EF   Date Value Ref Range Status   05/08/2024 55 % Final   05/04/2022 60 % Final          Recent Labs   Lab 07/12/24  1933   COLORU Yellow   APPEARANCEUA Cloudy*   PHUR 7.0   SPECGRAV 1.005   PROTEINUA Trace*   GLUCUA Negative   KETONESU Negative   BILIRUBINUA Negative   OCCULTUA Negative   NITRITE Negative   LEUKOCYTESUR 3+*   RBCUA 3   WBCUA 37*   BACTERIA Rare   SQUAMEPITHEL 5   HYALINECASTS 1       Lactate (Lactic Acid) (mmol/L)   Date Value   07/11/2024 1.8   05/21/2024 2.0   05/07/2024 1.2   04/28/2022 1.2   06/25/2021 2.1     BNP (pg/mL)   Date Value   05/07/2024 1,165 (H)   03/01/2024 1,261 (H)   05/12/2022 422 (H)   06/25/2021 178 (H)   06/23/2021 599 (H)     CRP (mg/L)   Date Value   04/09/2021 0.4   08/09/2010 0.7     Sed Rate   Date Value   04/09/2021 <2 mm/Hr   08/09/2010 26 mm/hr (H)     No results found for: "DDIMER"  Ferritin (ng/mL)   Date Value   06/23/2016 98     No results found for: "LDH"  Troponin I (ng/mL)   Date Value   05/08/2024 0.028 (H)   05/08/2024 0.031 (H)   05/07/2024 0.032 (H)   03/01/2024 0.020   05/12/2022 0.028 (H)   05/12/2022 0.029 (H)   05/12/2022 0.030 (H)   05/03/2022 0.030 (H)     Results for orders placed or performed in visit on 02/15/23   Vitamin D   Result Value Ref Range    Vit D, 25-Hydroxy 50 30 - 96 ng/mL   Results for orders placed or performed in visit on 05/04/20   Vitamin D   Result Value Ref " Range    Vit D, 25-Hydroxy 42 30 - 96 ng/mL   Results for orders placed or performed in visit on 08/22/19   Vitamin D   Result Value Ref Range    Vit D, 25-Hydroxy 44 30 - 96 ng/mL     SARS-CoV2 (COVID-19) Qualitative PCR (no units)   Date Value   08/03/2021 Not Detected   03/08/2021 Not Detected     POC Rapid COVID (no units)   Date Value   04/28/2022 Negative   06/23/2021 Negative       Microbiology labs for the last week  Microbiology Results (last 7 days)       Procedure Component Value Units Date/Time    Urine culture [[id number]] Collected: 07/12/24 1933    Order Status: No result Specimen: Urine Updated: 07/12/24 2047    Blood culture #1 **CANNOT BE ORDERED STAT** [[id number]] Collected: 07/12/24 1517    Order Status: Sent Specimen: Blood from Peripheral, Forearm, Right     Blood culture #2 **CANNOT BE ORDERED STAT** [[id number]] Collected: 07/12/24 1516    Order Status: Sent Specimen: Blood from Peripheral, Antecubital, Right             Reviewed and noted in plan where applicable- Please see chart for full lab data.    Lines/Drains:       Peripheral IV - Single Lumen 07/12/24 1511 22 G Anterior;Right Shoulder (Active)   Site Assessment Clean;Dry;Intact 07/13/24 0012   Extremity Assessment Distal to IV No abnormal discoloration 07/13/24 0012   Line Status Saline locked 07/13/24 0012   Dressing Status Clean;Dry;Intact 07/13/24 0012   Dressing Intervention Integrity maintained 07/13/24 0012   Dressing Change Due 07/16/24 07/13/24 0012   Site Change Due 07/16/24 07/13/24 0012   Number of days: 0       Imaging      Results for orders placed during the hospital encounter of 05/07/24    Echo    Interpretation Summary    Left Ventricle: The left ventricle is normal in size. Ventricular mass is normal. Normal wall thickness. Normal wall motion. There is normal systolic function. Ejection fraction by visual approximation is 55%. Unable to assess diastolic function due to atrial fibrillation.    Right Ventricle:  Normal right ventricular cavity size. Wall thickness is normal. Systolic function is mildly reduced.    Aortic Valve: The aortic valve is a trileaflet valve. There is moderate aortic valve sclerosis. There is annular calcification present. Mildly restricted motion. No stenosis.    Tricuspid Valve: There is mild to moderate regurgitation.    Pulmonary Artery: The estimated pulmonary artery systolic pressure is 44 mmHg.    IVC/SVC: Elevated venous pressure at 15 mmHg.      US Upper Extremity Veins Left  Narrative: EXAMINATION:  US UPPER EXTREMITY VEINS LEFT    CLINICAL HISTORY:  left arm swelling;    TECHNIQUE:  Duplex and color flow Doppler evaluation and dynamic compression was performed of the left upper extremity veins.    COMPARISON:  None    FINDINGS:  Central veins: The internal jugular, subclavian, and axillary veins are patent and free of thrombus.    Arm veins: The brachial, basilic, and cephalic veins are patent and compressible.    Contralateral subclavian/internal jugular veins: The right subclavian and internal jugular veins are patent and free of thrombus.    Other findings: None.  Impression: No thrombus in central veins of the left upper extremity.    Electronically signed by: Nav Rogers MD  Date:    07/11/2024  Time:    13:39  X-Ray Elbow Complete Left  Narrative: EXAMINATION:  XR ELBOW COMPLETE 3 VIEW LEFT    CLINICAL HISTORY:  left arm swelling;    TECHNIQUE:  Three views of the left elbow were obtained, with AP, lateral, and oblique projections submitted.    COMPARISON:  No relevant comparison examinations are currently available.  Clinical information obtained from the electronic medical record indicates left arm swelling, with no recent trauma history.    FINDINGS:  There is diffuse soft tissue swelling identified.  Visualized osseous structures appear unremarkable, with no evidence of recent or healing fracture, lytic destructive process, or appreciable arthritic change noted.  No elbow  joint effusion is demonstrated.  No radiopaque soft tissue foreign body or abnormal gas collection within the soft tissues is appreciated.  Impression: As above    Electronically signed by: Lobito Guillen MD  Date:    07/11/2024  Time:    12:38      Labs, Imaging, EKG and Diagnostic results from 7/13/2024 were reviewed.    Medications:  Medication list was reviewed and changes noted under Assessment/Plan.  No current facility-administered medications on file prior to encounter.     Current Outpatient Medications on File Prior to Encounter   Medication Sig Dispense Refill    acetaminophen (TYLENOL) 500 MG tablet Take 2 tablets (1,000 mg total) by mouth every 8 (eight) hours as needed for Pain. 42 tablet 0    albuterol (PROVENTIL HFA) 90 mcg/actuation inhaler Inhale 2 puffs into the lungs every 6 (six) hours as needed for Wheezing. Rescue 18 g 3    apixaban (ELIQUIS) 2.5 mg Tab Take 1 tablet (2.5 mg total) by mouth 2 (two) times a day. 60 tablet 11    aspirin (ECOTRIN) 81 MG EC tablet Take 81 mg by mouth once daily.      biotin 1 mg tablet Take 1,000 mcg by mouth once daily.      budesonide-glycopyr-formoterol (BREZTRI AEROSPHERE) 160-9-4.8 mcg/actuation HFAA Inhale 2 puffs into the lungs 2 (two) times daily. 32.1 g 3    busPIRone (BUSPAR) 5 MG Tab Take 5 mg by mouth as needed.      cefadroxil (DURICEF) 500 MG Cap Take 2 capsules (1,000 mg total) by mouth once daily. for 7 days 14 capsule 0    fluticasone propionate (FLONASE) 50 mcg/actuation nasal spray 1 spray by Each Nostril route daily as needed (congestion).      furosemide (LASIX) 40 MG tablet Take 1 tablet (40 mg total) by mouth 2 (two) times daily. 180 tablet 3    inhalation spacing device Use as directed for inhalation. 1 Device 0    metoprolol succinate (TOPROL-XL) 100 MG 24 hr tablet Take 1 tablet (100 mg total) by mouth once daily. 90 tablet 3    rosuvastatin (CRESTOR) 5 MG tablet Take 1 tablet (5 mg total) by mouth once daily. (Patient not taking: Reported on  7/10/2024) 90 tablet 3    tamsulosin (FLOMAX) 0.4 mg Cap Take 1 capsule (0.4 mg total) by mouth once daily. (Patient not taking: Reported on 7/10/2024) 30 capsule 11    vitamin D (VITAMIN D3) 1000 units Tab Take 2 tablets (2,000 Units total) by mouth once daily. 180 tablet 3     Scheduled Medications:  Current Facility-Administered Medications   Medication Dose Route Frequency    ampicillin-sulbactam  3 g Intravenous Q6H    apixaban  2.5 mg Oral BID    aspirin  81 mg Oral Daily    atorvastatin  20 mg Oral Daily    doxycycline  100 mg Oral Q12H    furosemide  40 mg Oral Daily    metoprolol succinate  100 mg Oral Daily    vitamin D  2,000 Units Oral Daily     PRN:   Current Facility-Administered Medications:     albuterol, 2 puff, Inhalation, Q6H PRN    dextrose 10%, 12.5 g, Intravenous, PRN    dextrose 10%, 25 g, Intravenous, PRN    glucagon (human recombinant), 1 mg, Intramuscular, PRN    glucose, 16 g, Oral, PRN    glucose, 24 g, Oral, PRN    naloxone, 0.02 mg, Intravenous, PRN    sodium chloride 0.9%, 10 mL, Intravenous, Q12H PRN  Infusions:   Estimated Creatinine Clearance: 33.7 mL/min (based on SCr of 1.3 mg/dL).             Assessment/Plan:      * Cellulitis  presents with  left sided painful erythematous rash which extends from her medial elbow to her fifth digit.  was seen at the ER on 7/12/24 and diagnosed with Erysipelas and discharged on antibiotics outpatient. She e was called back after GNR's were seen on her 1/2 blood cultuers     ER course - X ray left elbow -diffuse soft tissue swelling identified. Visualized osseous structures appear unremarkable, with no evidence of recent or healing fracture, lytic destructive process, or appreciable arthritic change noted. No elbow joint effusion is demonstrated. No radiopaque soft tissue foreign body or abnormal gas collection within the soft tissues is appreciated. US LUE -No thrombus in central veins of the left upper extremity.  Repeat BCX 2 . started on IV  Zosyn     ID evaluation     Leucocytosis    Patient with leucocytosis   Recent Labs   Lab 07/11/24  1000 07/12/24  1517   WBC 9.81 15.42*     . Afebrile. BCX 2 likely secondary to sepsis..      Anemia    Patient's with Normocytic anemia.. Hemoglobin stable. Etiology likely due to chronic disease .  Current CBC reviewed-    Recent Labs   Lab 07/11/24  1000   HGB 11.6*         Component Value Date/Time    MCV 93 07/11/2024 1000    RDW 13.8 07/11/2024 1000    IRON 115 06/23/2016 0947    FERRITIN 98 06/23/2016 0947     Monitor CBC and transfuse if H/H drops below 7/21.      Gram-negative bacteremia  GNR's were seen on her 1/2 blood cultures on 7/11. repeat BC x2         Diastolic CHF, chronic  Patient is identified as having Diastolic (HFpEF) heart failure that is Chronic. CHF is currently controlled. Latest ECHO performed and demonstrates- Results for orders placed during the hospital encounter of 05/07/24    Echo    Interpretation Summary    Left Ventricle: The left ventricle is normal in size. Ventricular mass is normal. Normal wall thickness. Normal wall motion. There is normal systolic function. Ejection fraction by visual approximation is 55%. Unable to assess diastolic function due to atrial fibrillation.    Right Ventricle: Normal right ventricular cavity size. Wall thickness is normal. Systolic function is mildly reduced.    Aortic Valve: The aortic valve is a trileaflet valve. There is moderate aortic valve sclerosis. There is annular calcification present. Mildly restricted motion. No stenosis.    Tricuspid Valve: There is mild to moderate regurgitation.    Pulmonary Artery: The estimated pulmonary artery systolic pressure is 44 mmHg.    IVC/SVC: Elevated venous pressure at 15 mmHg.    Hypokalemia  Patient has hypokalemia which is Acute and currently stable. Most recent potassium levels reviewed-   Lab Results   Component Value Date    K 3.4 (L) 07/13/2024   . Will continue potassium replacement per protocol  and recheck repeat levels after replacement completed.     Chronic anticoagulation  continue eliquis       Chronic respiratory failure with hypoxia  HFpEF, COPD on home O2  on 2L NC baseline     Hyperlipidemia  continue statin       Paroxysmal atrial fibrillation  continue metoprolol and eliquis. last admission 6/27 - had successful RFA of the CTI for treatment of CTI-dependent atrial flutter. No evidence of intra- or post-procedure complications. Post-ablation ECG shows NSR, and no acute abnormalities.     CKD (chronic kidney disease) stage 3, GFR 30-59 ml/min  Creatine stable for now. BMP reviewed- noted Estimated Creatinine Clearance: 33.7 mL/min (based on SCr of 1.3 mg/dL). according to latest data. Monitor UOP and serial BMP and adjust therapy as needed. Renally dose meds.    Recent Labs   Lab 07/11/24  1000   BUN 18   CREATININE 1.3       I & O     Intake/Output Summary (Last 24 hours) at 7/12/2024 1640  Last data filed at 7/12/2024 1611  Gross per 24 hour   Intake 100 ml   Output --   Net 100 ml        Essential hypertension  Chronic, controlled. Latest blood pressure and vitals reviewed-     Temp:  [97.8 °F (36.6 °C)-98 °F (36.7 °C)]   Pulse:  [65-68]   Resp:  [18-20]   BP: (107-109)/(52)   SpO2:  [95 %-99 %] .   Home meds for hypertension were reviewed and noted below.   Hypertension Medications               furosemide (LASIX) 40 MG tablet Take 1 tablet (40 mg total) by mouth 2 (two) times daily.    metoprolol succinate (TOPROL-XL) 100 MG 24 hr tablet Take 1 tablet (100 mg total) by mouth once daily.            While in the hospital, will manage blood pressure as follows; Continue home antihypertensive regimen    Will utilize p.r.n. blood pressure medication only if patient's blood pressure greater than 180/110 and she develops symptoms such as worsening chest pain or shortness of breath.      VTE Risk Mitigation (From admission, onward)           Ordered     apixaban (ELIQUIS) 2.5 mg tablet        Note to  Pharmacy: Created by cabinet override    07/12/24 2106     apixaban tablet 2.5 mg  2 times daily         07/12/24 3209                    Discharge Planning   NOHEMI: 7/15/2024     Code Status: Full Code   Is the patient medically ready for discharge?:     Reason for patient still in hospital (select all that apply): Treatment                     Jarad Dueñas MD  Department of Hospital Medicine   [facility] ([place])

## 2024-07-13 NOTE — ASSESSMENT & PLAN NOTE
78 year old woman with multiple co-morbidities (see HPI) presented with LUE pain/swelling/cellulitis and gram negative bacteremia.   Reports cat scratch to left wrist approximately two days prior to onset of symptoms.    Xray of LUE showed soft tissue swelling, no fracture, no foreign bodies, no effusion.   Venous US was negative for DVT.  Blood cultures of 7/11 with one bottle with GNR,  ID pending.  Rapid PCR negative.  Repeats of 7/12 are pending.  She is afebrile, WBC 15.  Hemodynamically stable.  Currently on doxy and zosyn.  ID is consulted for GNR bacteremia.     Suspect cat scratch (?  Pasturella) source of LUE cellulitis and gram negative bacteremia,.  Identification of organism in blood culture will be helpful.  Patient reports improvement in swelling and redness since starting IV antibiotics.     Plan/recommendations:   Discontinue zosyn and start IV unasyn 3 g q 6 hours (cr cl >30).  Monitor kidney function    Ok to continue doxy for now    Recommend judicious LUE elevation - hand above elbow above shoulder.     Will follow blood cultures and follow up tomorrow.     Patient seen by, and plan discussed with, ID staff, Dr. Spencer  Discussed with Primary Team, Dr. Dueñas

## 2024-07-13 NOTE — HPI
Ms. Misa Barajas is an 78 year old with HTN, HFpEF, COPD on Home O2, pAF s/p ablation on 6/17/24, remote history of breast CA who presented to ED on 7/11 with left arem pain and swelling with rash from elbow to 5th finger. She was diagnosed with erysipelas, given cefadroxil, and discharged.  Called back to ED when a blood culture turned + for GNR.  LUE swelling reportedly started 7-9 days ago.  Developed rash, leaking clear fluid.  She did note she had a cat scratch on wrist a couple of days ago.  An Xray of LUE showed soft tissue swelling, no fracture, no foreign bodies, no effusion.   A venous US was negative.  Blood cultures of 7/11 with one bottle with GNR.  ID pending.  Rapid PCR negative.  Repeats of 7/12 are pending.  She is afebrile, WBC 15.  Currently on doxy and zosyn.  ID is consulted for GNR bacteremia

## 2024-07-13 NOTE — SUBJECTIVE & OBJECTIVE
Past Medical History:   Diagnosis Date    Acute hypoxemic respiratory failure 6/26/2021    Acute superficial venous thrombosis of lower extremity, bilateral 1/25/2022    Anemia 7/12/2024    Aortic atherosclerosis     Arthritis     knee joint pain    Asthma-COPD overlap syndrome 8/22/2022    Breast cancer 2002    left breast & lymph nodes-s/p sx with chemo    Bronchitis     with flu-2/2014    Cataracts, bilateral     Chronic anticoagulation 5/4/2022    Chronic diastolic heart failure 12/24/2019    Chronic kidney disease, stage 3     Class 1 obesity due to excess calories with serious comorbidity and body mass index (BMI) of 30.0 to 30.9 in adult 5/16/2024    History of chemotherapy     last treatment 12/2002 (had 8 treatments)    Hyperlipidemia 11/20/2016    Hypertension     Lymphedema of both lower extremities 1/25/2022    Nephrolithiasis 6/2/2014    Osteoporosis     Paroxysmal atrial fibrillation 6/7/2021    Renal calculi     hematuria    Renal osteodystrophy 1/14/2016    Venous stasis dermatitis of both lower extremities 1/25/2022    Vitamin D insufficiency        Past Surgical History:   Procedure Laterality Date    ABLATION, ATRIAL FLUTTER, TYPICAL N/A 6/17/2024    Procedure: Ablation, Atrial Flutter, Typical;  Surgeon: Taz Church MD;  Location: [facility] EP LAB;  Service: Cardiology;  Laterality: N/A;  AFL, RFA, LORENE, MAKAYLA (cx if SR), LEANNE miller, 3 Prep    BREAST BIOPSY Left 2002    core bx, +    BREAST BIOPSY Right 2018    core    BREAST BIOPSY Right 2019    BREAST LUMPECTOMY Left 2002    CYSTOSCOPY  4/28/2022    Procedure: CYSTOSCOPY;  Surgeon: Vik Ware MD;  Location: [facility] OR [address] [place] [address];  Service: Urology;;    CYSTOSCOPY  5/30/2022    Procedure: CYSTOSCOPY;  Surgeon: Bonnie Knutson MD;  Location: [facility] OR [address] [place] [address];  Service: Urology;;    ECHOCARDIOGRAM,TRANSESOPHAGEAL N/A 6/17/2024    Procedure: Transesophageal echo (MAKAYLA) intra-procedure log documentation;  Surgeon: Nestor Martinez MD;   Location: [facility] EP LAB;  Service: Cardiology;  Laterality: N/A;    LASER LITHOTRIPSY  5/30/2022    Procedure: LITHOTRIPSY, USING LASER;  Surgeon: Bonnie Knutson MD;  [facility];  Service: Urology;;    LITHOTRIPSY      MASTECTOMY Left 06/2002    left-& lymph node dissection    REPLACEMENT OF STENT Right 5/30/2022    Procedure: REPLACEMENT, STENT;  Surgeon: Bonnie Knutson MD;  [facility];  Service: Urology;  Laterality: Right;    RETROGRADE PYELOGRAPHY Right 4/28/2022    Procedure: PYELOGRAM, RETROGRADE;  Surgeon: Vik Ware MD;  [facility];  Service: Urology;  Laterality: Right;    ROBOT-ASSISTED LAPAROSCOPIC PARTIAL NEPHRECTOMY USING DA JESÚS XI Right 3/11/2021    Procedure: XI ROBOTIC NEPHRECTOMY, PARTIAL;  Surgeon: Taz Ortega MD;  [facility];  Service: Urology;  Laterality: Right;  4hr/ gen with regional  [place] confirmation:  [id number] for 11:15am case NC    URETEROSCOPIC REMOVAL OF URETERIC CALCULUS Right 5/30/2022    Procedure: REMOVAL, CALCULUS, URETER, URETEROSCOPIC;  Surgeon: Bonnie Knutson MD;  [facility];  Service: Urology;  Laterality: Right;    ureteroscopy Bilateral     6.14    URETEROSCOPY Right 5/30/2022    Procedure: URETEROSCOPY;  Surgeon: Bonnie Knutson MD;  Location: [facility] OR [address] [place] [address];  Service: Urology;  Laterality: Right;       Review of patient's allergies indicates:   Allergen Reactions    Adhesive tape-silicones     Adhesive Rash     Pt states she removed a LATEX bandaid and the skin beneath was swollen and red. No other latex causes a reaction.       Medications:  Medications Prior to Admission   Medication Sig    acetaminophen (TYLENOL) 500 MG tablet Take 2 tablets (1,000 mg total) by mouth every 8 (eight) hours as needed for Pain.    albuterol (PROVENTIL HFA) 90 mcg/actuation inhaler Inhale 2 puffs into the lungs every 6 (six) hours as needed for Wheezing. Rescue     apixaban (ELIQUIS) 2.5 mg Tab Take 1 tablet (2.5 mg total) by mouth 2 (two) times a day.    aspirin (ECOTRIN) 81 MG EC tablet Take 81 mg by mouth once daily.    biotin 1 mg tablet Take 1,000 mcg by mouth once daily.    budesonide-glycopyr-formoterol (BREZTRI AEROSPHERE) 160-9-4.8 mcg/actuation HFAA Inhale 2 puffs into the lungs 2 (two) times daily.    busPIRone (BUSPAR) 5 MG Tab Take 5 mg by mouth as needed.    cefadroxil (DURICEF) 500 MG Cap Take 2 capsules (1,000 mg total) by mouth once daily. for 7 days    fluticasone propionate (FLONASE) 50 mcg/actuation nasal spray 1 spray by Each Nostril route daily as needed (congestion).    furosemide (LASIX) 40 MG tablet Take 1 tablet (40 mg total) by mouth 2 (two) times daily.    inhalation spacing device Use as directed for inhalation.    metoprolol succinate (TOPROL-XL) 100 MG 24 hr tablet Take 1 tablet (100 mg total) by mouth once daily.    rosuvastatin (CRESTOR) 5 MG tablet Take 1 tablet (5 mg total) by mouth once daily. (Patient not taking: Reported on 7/10/2024)    tamsulosin (FLOMAX) 0.4 mg Cap Take 1 capsule (0.4 mg total) by mouth once daily. (Patient not taking: Reported on 7/10/2024)    vitamin D (VITAMIN D3) 1000 units Tab Take 2 tablets (2,000 Units total) by mouth once daily.     Antibiotics (From admission, onward)      Start     Stop Route Frequency Ordered    07/12/24 2106  doxycycline (VIBRA-TABS) 100 MG tablet        Note to Pharmacy: Created by cabinet override    07/13/24 0914   07/12/24 2106 07/12/24 2100  doxycycline tablet 100 mg         -- Oral Every 12 hours 07/12/24 1911 07/12/24 0000  piperacillin-tazobactam (ZOSYN) 4.5 g in D5W 100 mL IVPB (MB+)         -- IV Every 8 hours (non-standard times) 07/12/24 1648          Antifungals (From admission, onward)      None          Antivirals (From admission, onward)      None             Immunization History   Administered Date(s) Administered    COVID-19, MRNA, LN-S, PF (Pfizer) (Purple Cap)  01/13/2021, 02/04/2021, 12/10/2021    Influenza 10/05/2009, 10/12/2011, 09/10/2014, 09/12/2019    Influenza (FLUAD) - Quadrivalent - Adjuvanted - PF *Preferred* (65+) 10/08/2020, 10/20/2022    Influenza (FLUAD) - Trivalent - Adjuvanted - PF (65+) 09/12/2019    Influenza - High Dose - PF (65 years and older) 09/23/2015, 09/14/2016, 09/05/2017, 09/12/2018, 10/08/2020    Influenza - Quadrivalent - High Dose - PF (65 years and older) 10/22/2021, 11/08/2023    Influenza - Quadrivalent - PF *Preferred* (6 months and older) 10/05/2009, 10/12/2011, 09/10/2014    Influenza - Trivalent (ADULT) 10/05/2009, 10/12/2011, 09/10/2014    Influenza Split 09/10/2014    Pneumococcal Conjugate - 13 Valent 01/14/2016    Pneumococcal Polysaccharide - 23 Valent 09/30/2014    Tdap 08/08/2016       Family History       Problem Relation (Age of Onset)    Arthritis Mother, Sister    Bone cancer Mother    Breast cancer Mother, Sister    Cancer Father    Crohn's disease Daughter    Fibroids Daughter    No Known Problems Brother, Daughter          Social History     Socioeconomic History    Marital status:    Tobacco Use    Smoking status: Former     Current packs/day: 0.00     Average packs/day: 1 pack/day for 50.0 years (50.0 ttl pk-yrs)     Types: Cigarettes     Start date: 1960     Quit date: 5/20/2009     Years since quitting: 15.1    Smokeless tobacco: Former   Substance and Sexual Activity    Alcohol use: No    Drug use: No    Sexual activity: Not Currently     Partners: Male     Birth control/protection: Partner-Vasectomy     Social Determinants of Health     Financial Resource Strain: Low Risk  (3/3/2024)    Overall Financial Resource Strain (CARDIA)     Difficulty of Paying Living Expenses: Not very hard   Food Insecurity: No Food Insecurity (3/3/2024)    Hunger Vital Sign     Worried About Running Out of Food in the Last Year: Never true     Ran Out of Food in the Last Year: Never true   Transportation Needs: No Transportation  Needs (3/3/2024)    PRAPARE - Transportation     Lack of Transportation (Medical): No     Lack of Transportation (Non-Medical): No   Physical Activity: Inactive (3/3/2024)    Exercise Vital Sign     Days of Exercise per Week: 0 days     Minutes of Exercise per Session: 0 min   Stress: No Stress Concern Present (3/3/2024)    [employer] - Occupational Stress Questionnaire     Feeling of Stress : Not at all   Housing Stability: Low Risk  (3/3/2024)    Housing Stability Vital Sign     Unable to Pay for Housing in the Last Year: No     Number of Places Lived in the Last Year: 1     Unstable Housing in the Last Year: No     Review of Systems   Constitutional:  Positive for activity change, chills and fever. Negative for appetite change, diaphoresis and fatigue.   Cardiovascular:  Positive for leg swelling.   Musculoskeletal:  Positive for arthralgias and joint swelling.   Skin:  Positive for color change and rash.   Neurological:  Positive for headaches.   Hematological:  Bruises/bleeds easily.   All other systems reviewed and are negative.    Objective:     Vital Signs (Most Recent):  Temp: 97.6 °F (36.4 °C) (07/13/24 1219)  Pulse: 64 (07/13/24 0836)  Resp: 18 (07/13/24 1219)  BP: (!) 141/62 (07/13/24 1219)  SpO2: 100 % (07/13/24 0836) Vital Signs (24h Range):  Temp:  [97.6 °F (36.4 °C)-98.1 °F (36.7 °C)] 97.6 °F (36.4 °C)  Pulse:  [63-73] 64  Resp:  [18-22] 18  SpO2:  [98 %-100 %] 100 %  BP: (117-148)/(55-72) 141/62     Weight: 75.8 kg (167 lb)  Body mass index is 29.58 kg/m².    Estimated Creatinine Clearance: 33.7 mL/min (based on SCr of 1.3 mg/dL).     Physical Exam  Vitals and nursing note reviewed.   Constitutional:       General: She is not in acute distress.     Appearance: Normal appearance. She is not ill-appearing, toxic-appearing or diaphoretic.   HENT:      Head: Normocephalic and atraumatic.      Mouth/Throat:      Mouth: Mucous membranes are moist.   Eyes:      General: No  scleral icterus.     Conjunctiva/sclera: Conjunctivae normal.   Cardiovascular:      Rate and Rhythm: Normal rate and regular rhythm.   Pulmonary:      Effort: Pulmonary effort is normal. No respiratory distress.   Abdominal:      General: There is no distension.      Palpations: Abdomen is soft.      Tenderness: There is no abdominal tenderness.   Musculoskeletal:      Cervical back: Normal range of motion.      Right lower leg: Edema present.      Left lower leg: Edema present.   Skin:     General: Skin is warm and dry.      Findings: Erythema and rash present.      Comments: Left hand/arm swollen, erythematous, warm to touch.    See photos below   Neurological:      Mental Status: She is alert and oriented to person, place, and time.   Psychiatric:         Mood and Affect: Mood normal.         Behavior: Behavior normal.            Significant Labs: Blood Culture:   Recent Labs   Lab 05/07/24  2251 05/09/24  1433 05/09/24  1434 07/11/24  1000 07/11/24  1046   LABBLOO No growth after 5 days.  Gram stain aer bottle: Gram positive cocci in clusters resembling Staph  Results called to and read back by:Josephine Minor LPN 05/09/2024  13:59  MICROCOCCUS SPECIES  Organism is a probable contaminant  * No growth after 5 days. No growth after 5 days. Gram stain rossi bottle: Gram negative rods  Results called to and read back by:Gaviota Viera RN 07/12/2024 09:14  PASTEURELLA MULTOCIDA  Beta Lactamase negative  * No Growth to date  No Growth to date  No Growth to date     CBC:   Recent Labs   Lab 07/12/24  1517 07/12/24  1523 07/13/24  0519 07/13/24  1118   WBC 15.42*  --  11.15 10.25   HGB 12.7  --  11.7* 11.4*   HCT 40.4 40 38.0 37.3     --  163 176     CMP:   Recent Labs   Lab 07/12/24  1517 07/13/24  0519    137   K 3.9 3.4*    106   CO2 24 23   GLU 88 69*   BUN 23 24*   CREATININE 1.2 1.3   CALCIUM 10.5 9.1   PROT 7.0 5.3*   ALBUMIN 2.8* 2.1*   BILITOT 1.6* 1.4*   ALKPHOS 125 92   AST 53* 29    ALT 25 18   ANIONGAP 13 8     Microbiology Results (last 7 days)       Procedure Component Value Units Date/Time    Urine culture [[id number]] Collected: 07/12/24 1933    Order Status: No result Specimen: Urine Updated: 07/12/24 2047    Blood culture #1 **CANNOT BE ORDERED STAT** [[id number]] Collected: 07/12/24 1517    Order Status: Sent Specimen: Blood from Peripheral, Forearm, Right     Blood culture #2 **CANNOT BE ORDERED STAT** [[id number]] Collected: 07/12/24 1516    Order Status: Sent Specimen: Blood from Peripheral, Antecubital, Right             Significant Imaging: I have reviewed all pertinent imaging results/findings within the past 24 hours.

## 2024-07-13 NOTE — PLAN OF CARE
Problem: Acute Kidney Injury/Impairment  Goal: Fluid and Electrolyte Balance  Outcome: Progressing     Problem: Wound  Goal: Absence of Infection Signs and Symptoms  Outcome: Progressing   Pt free from pain. Iv abx given. Pt c/o SOB with exertion, 2L O2 placed. Andres hose placed.  Bed locked and in lowest position. Call light in reach.

## 2024-07-13 NOTE — CONSULTS
[facility] - [facility] ([place] [address])  Infectious Disease  Consult Note    Patient Name: Misa Barajas  MRN: [id number]  Admission Date: 7/12/2024  Hospital Length of Stay: 1 days  Attending Physician: Jarad Dueñas MD  Primary Care Provider: Celia Carlisle MD     Isolation Status: No active isolations    Patient information was obtained from patient, relative(s), past medical records, ER records, and primary team.      Inpatient consult to Infectious Diseases  Consult performed by: Stephani Martin, APRN, ANP  Consult ordered by: Jarad Dueñas MD  Reason for consult: gram negative bacteremia        Assessment/Plan:     Gram-negative bacteremia    78 year old woman with multiple co-morbidities (see HPI) presented with LUE pain/swelling/cellulitis and gram negative bacteremia.   Reports cat scratch to left wrist approximately two days prior to onset of symptoms.    Xray of LUE showed soft tissue swelling, no fracture, no foreign bodies, no effusion.   Venous US was negative for DVT.  Blood cultures of 7/11 with one bottle with GNR,  ID pending.  Rapid PCR negative.  Repeats of 7/12 are pending.  She is afebrile, WBC 15.  Hemodynamically stable.  Currently on doxy and zosyn.  ID is consulted for GNR bacteremia.     Suspect cat scratch (?  Pasturella) source of LUE cellulitis and gram negative bacteremia,.  Identification of organism in blood culture will be helpful.  Patient reports improvement in swelling and redness since starting IV antibiotics.     Plan/recommendations:   Discontinue zosyn and start IV unasyn 3 g q 6 hours (cr cl >30).  Monitor kidney function    Ok to continue doxy for now    Recommend judicious LUE elevation - hand above elbow above shoulder.     Will follow blood cultures and follow up tomorrow.     ID  * Cellulitis  See assessment/plan above     Patient seen by, and plan discussed with, ID staff, Dr. Spencer  Discussed with Primary Team, Dr. Dueñas      Thank you.   Please  Secure Chat for any questions or concerns.  ESEQUIEL Sanchez, ANP-C  Infectious Disease     I spent a total of 80 minutes on the day of the visit.This includes face to face time and non-face to face time preparing to see the patient (eg, review of tests), obtaining and/or reviewing separately obtained history, documenting clinical information in the electronic or other health record, independently interpreting results and communicating results to the patient/family/caregiver, or care coordinator.   Subjective:     Principal Problem: Cellulitis    HPI: Ms. Misa Barajas is an 78 year old with HTN, HFpEF, COPD on Home O2, pAF s/p ablation on 6/17/24, remote history of breast CA who presented to ED on 7/11 with left arem pain and swelling with rash from elbow to 5th finger. She was diagnosed with erysipelas, given cefadroxil, and discharged.  Called back to ED when a blood culture turned + for GNR.  LUE swelling reportedly started 7-9 days ago.  Developed rash, leaking clear fluid.  She did note she had a cat scratch on wrist a couple of days ago.  An Xray of LUE showed soft tissue swelling, no fracture, no foreign bodies, no effusion.   A venous US was negative.  Blood cultures of 7/11 with one bottle with GNR.  ID pending.  Rapid PCR negative.  Repeats of 7/12 are pending.  She is afebrile, WBC 15.  Currently on doxy and zosyn.  ID is consulted for GNR bacteremia    Past Medical History:   Diagnosis Date    Acute hypoxemic respiratory failure 6/26/2021    Acute superficial venous thrombosis of lower extremity, bilateral 1/25/2022    Anemia 7/12/2024    Aortic atherosclerosis     Arthritis     knee joint pain    Asthma-COPD overlap syndrome 8/22/2022    Breast cancer 2002    left breast & lymph nodes-s/p sx with chemo    Bronchitis     with flu-2/2014    Cataracts, bilateral     Chronic anticoagulation 5/4/2022    Chronic diastolic heart failure 12/24/2019    Chronic kidney disease, stage 3     Class 1 obesity due  to excess calories with serious comorbidity and body mass index (BMI) of 30.0 to 30.9 in adult 5/16/2024    History of chemotherapy     last treatment 12/2002 (had 8 treatments)    Hyperlipidemia 11/20/2016    Hypertension     Lymphedema of both lower extremities 1/25/2022    Nephrolithiasis 6/2/2014    Osteoporosis     Paroxysmal atrial fibrillation 6/7/2021    Renal calculi     hematuria    Renal osteodystrophy 1/14/2016    Venous stasis dermatitis of both lower extremities 1/25/2022    Vitamin D insufficiency        Past Surgical History:   Procedure Laterality Date    ABLATION, ATRIAL FLUTTER, TYPICAL N/A 6/17/2024    Procedure: Ablation, Atrial Flutter, Typical;  Surgeon: Taz Church MD;  Location: [facility];  Service: Cardiology;  Laterality: N/A;  AFL, RFA, LORENE, MAKAYLA (cx if SR), anes, MB, 3 Prep    BREAST BIOPSY Left 2002    core bx, +    BREAST BIOPSY Right 2018    core    BREAST BIOPSY Right 2019    BREAST LUMPECTOMY Left 2002    CYSTOSCOPY  4/28/2022    Procedure: CYSTOSCOPY;  Surgeon: Vik Ware MD;  Location: [facility] OR [address] [place] [address];  Service: Urology;;    CYSTOSCOPY  5/30/2022    Procedure: CYSTOSCOPY;  Surgeon: Bonnie Knutson MD;  Location: [facility] OR [address] [place] [address];  Service: Urology;;    ECHOCARDIOGRAM,TRANSESOPHAGEAL N/A 6/17/2024    Procedure: Transesophageal echo (MAKAYLA) intra-procedure log documentation;  Surgeon: Nestor Martinez MD;  Location: [facility];  Service: Cardiology;  Laterality: N/A;    LASER LITHOTRIPSY  5/30/2022    Procedure: LITHOTRIPSY, USING LASER;  Surgeon: Bonnie Knutson MD;  Location: [facility] OR [address] [place] [address];  Service: Urology;;    LITHOTRIPSY      MASTECTOMY Left 06/2002    left-& lymph node dissection    REPLACEMENT OF STENT Right 5/30/2022    Procedure: REPLACEMENT, STENT;  Surgeon: Bonnie Knutson MD;  Location: [facility] OR [address] [place] [address];  Service: Urology;  Laterality: Right;    RETROGRADE PYELOGRAPHY Right 4/28/2022    Procedure: PYELOGRAM, RETROGRADE;  Surgeon:  Vik Ware MD;  Location: [facility] OR 1ST FLR;  Service: Urology;  Laterality: Right;    ROBOT-ASSISTED LAPAROSCOPIC PARTIAL NEPHRECTOMY USING DA JESÚS XI Right 3/11/2021    Procedure: XI ROBOTIC NEPHRECTOMY, PARTIAL;  Surgeon: Taz Ortega MD;  Location: [facility] OR 2ND FLR;  Service: Urology;  Laterality: Right;  4hr/ gen with regional  [facility] confirmation:  [id number] for 11:15am case NC    URETEROSCOPIC REMOVAL OF URETERIC CALCULUS Right 5/30/2022    Procedure: REMOVAL, CALCULUS, URETER, URETEROSCOPIC;  Surgeon: Bonnie Knutson MD;  Location: [facility] OR 1ST FLR;  Service: Urology;  Laterality: Right;    ureteroscopy Bilateral     6.14    URETEROSCOPY Right 5/30/2022    Procedure: URETEROSCOPY;  Surgeon: Bonnie Knutson MD;  [facility];  Service: Urology;  Laterality: Right;       Review of patient's allergies indicates:   Allergen Reactions    Adhesive tape-silicones     Adhesive Rash     Pt states she removed a LATEX bandaid and the skin beneath was swollen and red. No other latex causes a reaction.       Medications:  Medications Prior to Admission   Medication Sig    acetaminophen (TYLENOL) 500 MG tablet Take 2 tablets (1,000 mg total) by mouth every 8 (eight) hours as needed for Pain.    albuterol (PROVENTIL HFA) 90 mcg/actuation inhaler Inhale 2 puffs into the lungs every 6 (six) hours as needed for Wheezing. Rescue    apixaban (ELIQUIS) 2.5 mg Tab Take 1 tablet (2.5 mg total) by mouth 2 (two) times a day.    aspirin (ECOTRIN) 81 MG EC tablet Take 81 mg by mouth once daily.    biotin 1 mg tablet Take 1,000 mcg by mouth once daily.    budesonide-glycopyr-formoterol (BREZTRI AEROSPHERE) 160-9-4.8 mcg/actuation HFAA Inhale 2 puffs into the lungs 2 (two) times daily.    busPIRone (BUSPAR) 5 MG Tab Take 5 mg by mouth as needed.    cefadroxil (DURICEF) 500 MG Cap Take 2 capsules (1,000 mg total) by mouth once daily. for 7 days    fluticasone propionate (FLONASE) 50 mcg/actuation  nasal spray 1 spray by Each Nostril route daily as needed (congestion).    furosemide (LASIX) 40 MG tablet Take 1 tablet (40 mg total) by mouth 2 (two) times daily.    inhalation spacing device Use as directed for inhalation.    metoprolol succinate (TOPROL-XL) 100 MG 24 hr tablet Take 1 tablet (100 mg total) by mouth once daily.    rosuvastatin (CRESTOR) 5 MG tablet Take 1 tablet (5 mg total) by mouth once daily. (Patient not taking: Reported on 7/10/2024)    tamsulosin (FLOMAX) 0.4 mg Cap Take 1 capsule (0.4 mg total) by mouth once daily. (Patient not taking: Reported on 7/10/2024)    vitamin D (VITAMIN D3) 1000 units Tab Take 2 tablets (2,000 Units total) by mouth once daily.     Antibiotics (From admission, onward)      Start     Stop Route Frequency Ordered    07/12/24 2106  doxycycline (VIBRA-TABS) 100 MG tablet        Note to Pharmacy: Created by cabinet override    07/13/24 0914   07/12/24 2106 07/12/24 2100  doxycycline tablet 100 mg         -- Oral Every 12 hours 07/12/24 1911    07/12/24 0000  piperacillin-tazobactam (ZOSYN) 4.5 g in D5W 100 mL IVPB (MB+)         -- IV Every 8 hours (non-standard times) 07/12/24 1648          Antifungals (From admission, onward)      None          Antivirals (From admission, onward)      None             Immunization History   Administered Date(s) Administered    COVID-19, MRNA, LN-S, PF (Pfizer) (Purple Cap) 01/13/2021, 02/04/2021, 12/10/2021    Influenza 10/05/2009, 10/12/2011, 09/10/2014, 09/12/2019    Influenza (FLUAD) - Quadrivalent - Adjuvanted - PF *Preferred* (65+) 10/08/2020, 10/20/2022    Influenza (FLUAD) - Trivalent - Adjuvanted - PF (65+) 09/12/2019    Influenza - High Dose - PF (65 years and older) 09/23/2015, 09/14/2016, 09/05/2017, 09/12/2018, 10/08/2020    Influenza - Quadrivalent - High Dose - PF (65 years and older) 10/22/2021, 11/08/2023    Influenza - Quadrivalent - PF *Preferred* (6 months and older) 10/05/2009, 10/12/2011, 09/10/2014    Influenza  - Trivalent (ADULT) 10/05/2009, 10/12/2011, 09/10/2014    Influenza Split 09/10/2014    Pneumococcal Conjugate - 13 Valent 01/14/2016    Pneumococcal Polysaccharide - 23 Valent 09/30/2014    Tdap 08/08/2016       Family History       Problem Relation (Age of Onset)    Arthritis Mother, Sister    Bone cancer Mother    Breast cancer Mother, Sister    Cancer Father    Crohn's disease Daughter    Fibroids Daughter    No Known Problems Brother, Daughter          Social History     Socioeconomic History    Marital status:    Tobacco Use    Smoking status: Former     Current packs/day: 0.00     Average packs/day: 1 pack/day for 50.0 years (50.0 ttl pk-yrs)     Types: Cigarettes     Start date: 1960     Quit date: 5/20/2009     Years since quitting: 15.1    Smokeless tobacco: Former   Substance and Sexual Activity    Alcohol use: No    Drug use: No    Sexual activity: Not Currently     Partners: Male     Birth control/protection: Partner-Vasectomy     Social Determinants of Health     Financial Resource Strain: Low Risk  (3/3/2024)    Overall Financial Resource Strain (CARDIA)     Difficulty of Paying Living Expenses: Not very hard   Food Insecurity: No Food Insecurity (3/3/2024)    Hunger Vital Sign     Worried About Running Out of Food in the Last Year: Never true     Ran Out of Food in the Last Year: Never true   Transportation Needs: No Transportation Needs (3/3/2024)    PRAPARE - Transportation     Lack of Transportation (Medical): No     Lack of Transportation (Non-Medical): No   Physical Activity: Inactive (3/3/2024)    Exercise Vital Sign     Days of Exercise per Week: 0 days     Minutes of Exercise per Session: 0 min   Stress: No Stress Concern Present (3/3/2024)    [employer] - Occupational Stress Questionnaire     Feeling of Stress : Not at all   Housing Stability: Low Risk  (3/3/2024)    Housing Stability Vital Sign     Unable to Pay for Housing in the Last Year: No      Number of Places Lived in the Last Year: 1     Unstable Housing in the Last Year: No     Review of Systems   Constitutional:  Positive for activity change, chills and fever. Negative for appetite change, diaphoresis and fatigue.   Cardiovascular:  Positive for leg swelling.   Musculoskeletal:  Positive for arthralgias and joint swelling.   Skin:  Positive for color change and rash.   Neurological:  Positive for headaches.   Hematological:  Bruises/bleeds easily.   All other systems reviewed and are negative.    Objective:     Vital Signs (Most Recent):  Temp: 97.6 °F (36.4 °C) (07/13/24 1219)  Pulse: 64 (07/13/24 0836)  Resp: 18 (07/13/24 1219)  BP: (!) 141/62 (07/13/24 1219)  SpO2: 100 % (07/13/24 0836) Vital Signs (24h Range):  Temp:  [97.6 °F (36.4 °C)-98.1 °F (36.7 °C)] 97.6 °F (36.4 °C)  Pulse:  [63-73] 64  Resp:  [18-22] 18  SpO2:  [98 %-100 %] 100 %  BP: (117-148)/(55-72) 141/62     Weight: 75.8 kg (167 lb)  Body mass index is 29.58 kg/m².    Estimated Creatinine Clearance: 33.7 mL/min (based on SCr of 1.3 mg/dL).     Physical Exam  Vitals and nursing note reviewed.   Constitutional:       General: She is not in acute distress.     Appearance: Normal appearance. She is not ill-appearing, toxic-appearing or diaphoretic.   HENT:      Head: Normocephalic and atraumatic.      Mouth/Throat:      Mouth: Mucous membranes are moist.   Eyes:      General: No scleral icterus.     Conjunctiva/sclera: Conjunctivae normal.   Cardiovascular:      Rate and Rhythm: Normal rate and regular rhythm.   Pulmonary:      Effort: Pulmonary effort is normal. No respiratory distress.   Abdominal:      General: There is no distension.      Palpations: Abdomen is soft.      Tenderness: There is no abdominal tenderness.   Musculoskeletal:      Cervical back: Normal range of motion.      Right lower leg: Edema present.      Left lower leg: Edema present.   Skin:     General: Skin is warm and dry.      Findings: Erythema and rash present.       Comments: Left hand/arm swollen, erythematous, warm to touch.    See photos below   Neurological:      Mental Status: She is alert and oriented to person, place, and time.   Psychiatric:         Mood and Affect: Mood normal.         Behavior: Behavior normal.            Significant Labs: Blood Culture:   Recent Labs   Lab 05/07/24  2251 05/09/24  1433 05/09/24  1434 07/11/24  1000 07/11/24  1046   LABBLOO No growth after 5 days.  Gram stain aer bottle: Gram positive cocci in clusters resembling Staph  Results called to and read back by:Josephine Minor LPN 05/09/2024  13:59  MICROCOCCUS SPECIES  Organism is a probable contaminant  * No growth after 5 days. No growth after 5 days. Gram stain rossi bottle: Gram negative rods  Results called to and read back by:Gaviota Viera RN 07/12/2024 09:14  PASTEURELLA MULTOCIDA  Beta Lactamase negative  * No Growth to date  No Growth to date  No Growth to date     CBC:   Recent Labs   Lab 07/12/24  1517 07/12/24  1523 07/13/24  0519 07/13/24  1118   WBC 15.42*  --  11.15 10.25   HGB 12.7  --  11.7* 11.4*   HCT 40.4 40 38.0 37.3     --  163 176     CMP:   Recent Labs   Lab 07/12/24  1517 07/13/24  0519    137   K 3.9 3.4*    106   CO2 24 23   GLU 88 69*   BUN 23 24*   CREATININE 1.2 1.3   CALCIUM 10.5 9.1   PROT 7.0 5.3*   ALBUMIN 2.8* 2.1*   BILITOT 1.6* 1.4*   ALKPHOS 125 92   AST 53* 29   ALT 25 18   ANIONGAP 13 8     Microbiology Results (last 7 days)       Procedure Component Value Units Date/Time    Urine culture [[id number]] Collected: 07/12/24 1933    Order Status: No result Specimen: Urine Updated: 07/12/24 2047    Blood culture #1 **CANNOT BE ORDERED STAT** [[id number]] Collected: 07/12/24 1517    Order Status: Sent Specimen: Blood from Peripheral, Forearm, Right     Blood culture #2 **CANNOT BE ORDERED STAT** [[id number]] Collected: 07/12/24 1516    Order Status: Sent Specimen: Blood from Peripheral, Antecubital, Right              Significant Imaging: I have reviewed all pertinent imaging results/findings within the past 24 hours.

## 2024-07-13 NOTE — ASSESSMENT & PLAN NOTE
Patient has hypokalemia which is Acute and currently stable. Most recent potassium levels reviewed-   Lab Results   Component Value Date    K 3.4 (L) 07/13/2024   . Will continue potassium replacement per protocol and recheck repeat levels after replacement completed.

## 2024-07-13 NOTE — NURSING
Nurses Note -- 4 Eyes      7/13/2024   12:42 AM      Skin assessed during: Admit      [] No Altered Skin Integrity Present    []Prevention Measures Documented      [x] Yes- Altered Skin Integrity Present or Discovered   [] LDA Added if Not in Epic (Describe Wound)   [] New Altered Skin Integrity was Present on Admit and Documented in LDA   [] Wound Image Taken    Wound Care Consulted? No    Attending Nurse:  Eryn MILES    Second RN/Staff Member:   Mary MONTES

## 2024-07-14 LAB
ALBUMIN SERPL BCP-MCNC: 2 G/DL (ref 3.5–5.2)
ALP SERPL-CCNC: 92 U/L (ref 55–135)
ALT SERPL W/O P-5'-P-CCNC: 16 U/L (ref 10–44)
ANION GAP SERPL CALC-SCNC: 11 MMOL/L (ref 8–16)
AST SERPL-CCNC: 25 U/L (ref 10–40)
BACTERIA UR CULT: NORMAL
BASOPHILS # BLD AUTO: 0.05 K/UL (ref 0–0.2)
BASOPHILS NFR BLD: 0.7 % (ref 0–1.9)
BILIRUB SERPL-MCNC: 0.9 MG/DL (ref 0.1–1)
BUN SERPL-MCNC: 23 MG/DL (ref 8–23)
CALCIUM SERPL-MCNC: 9.6 MG/DL (ref 8.7–10.5)
CHLORIDE SERPL-SCNC: 108 MMOL/L (ref 95–110)
CO2 SERPL-SCNC: 25 MMOL/L (ref 23–29)
CREAT SERPL-MCNC: 1.6 MG/DL (ref 0.5–1.4)
DIFFERENTIAL METHOD BLD: ABNORMAL
EOSINOPHIL # BLD AUTO: 0.3 K/UL (ref 0–0.5)
EOSINOPHIL NFR BLD: 4.2 % (ref 0–8)
ERYTHROCYTE [DISTWIDTH] IN BLOOD BY AUTOMATED COUNT: 13.8 % (ref 11.5–14.5)
EST. GFR  (NO RACE VARIABLE): 32.4 ML/MIN/1.73 M^2
GLUCOSE SERPL-MCNC: 134 MG/DL (ref 70–110)
HCT VFR BLD AUTO: 33.9 % (ref 37–48.5)
HGB BLD-MCNC: 10.6 G/DL (ref 12–16)
IMM GRANULOCYTES # BLD AUTO: 0.02 K/UL (ref 0–0.04)
IMM GRANULOCYTES NFR BLD AUTO: 0.3 % (ref 0–0.5)
LYMPHOCYTES # BLD AUTO: 1.3 K/UL (ref 1–4.8)
LYMPHOCYTES NFR BLD: 19.4 % (ref 18–48)
MAGNESIUM SERPL-MCNC: 1.9 MG/DL (ref 1.6–2.6)
MCH RBC QN AUTO: 29.4 PG (ref 27–31)
MCHC RBC AUTO-ENTMCNC: 31.3 G/DL (ref 32–36)
MCV RBC AUTO: 94 FL (ref 82–98)
MONOCYTES # BLD AUTO: 0.8 K/UL (ref 0.3–1)
MONOCYTES NFR BLD: 11.3 % (ref 4–15)
NEUTROPHILS # BLD AUTO: 4.4 K/UL (ref 1.8–7.7)
NEUTROPHILS NFR BLD: 64.1 % (ref 38–73)
NRBC BLD-RTO: 0 /100 WBC
PHOSPHATE SERPL-MCNC: 2.2 MG/DL (ref 2.7–4.5)
PLATELET # BLD AUTO: 177 K/UL (ref 150–450)
PMV BLD AUTO: 10.1 FL (ref 9.2–12.9)
POTASSIUM SERPL-SCNC: 3.4 MMOL/L (ref 3.5–5.1)
PROT SERPL-MCNC: 5.4 G/DL (ref 6–8.4)
RBC # BLD AUTO: 3.61 M/UL (ref 4–5.4)
SODIUM SERPL-SCNC: 144 MMOL/L (ref 136–145)
WBC # BLD AUTO: 6.92 K/UL (ref 3.9–12.7)

## 2024-07-14 PROCEDURE — 99233 SBSQ HOSP IP/OBS HIGH 50: CPT | Mod: ,,, | Performed by: NURSE PRACTITIONER

## 2024-07-14 PROCEDURE — 63600175 PHARM REV CODE 636 W HCPCS: Performed by: HOSPITALIST

## 2024-07-14 PROCEDURE — 21400001 HC TELEMETRY ROOM

## 2024-07-14 PROCEDURE — 87040 BLOOD CULTURE FOR BACTERIA: CPT | Performed by: NURSE PRACTITIONER

## 2024-07-14 PROCEDURE — 25000003 PHARM REV CODE 250

## 2024-07-14 PROCEDURE — 85025 COMPLETE CBC W/AUTO DIFF WBC: CPT | Performed by: HOSPITALIST

## 2024-07-14 PROCEDURE — 83735 ASSAY OF MAGNESIUM: CPT | Performed by: HOSPITALIST

## 2024-07-14 PROCEDURE — 25000003 PHARM REV CODE 250: Performed by: HOSPITALIST

## 2024-07-14 PROCEDURE — 11000001 HC ACUTE MED/SURG PRIVATE ROOM

## 2024-07-14 PROCEDURE — 80053 COMPREHEN METABOLIC PANEL: CPT | Performed by: HOSPITALIST

## 2024-07-14 PROCEDURE — 63600175 PHARM REV CODE 636 W HCPCS: Performed by: NURSE PRACTITIONER

## 2024-07-14 PROCEDURE — 84100 ASSAY OF PHOSPHORUS: CPT | Performed by: HOSPITALIST

## 2024-07-14 PROCEDURE — 25000003 PHARM REV CODE 250: Performed by: NURSE PRACTITIONER

## 2024-07-14 PROCEDURE — 36415 COLL VENOUS BLD VENIPUNCTURE: CPT | Performed by: HOSPITALIST

## 2024-07-14 RX ORDER — POTASSIUM CHLORIDE 20 MEQ/1
40 TABLET, EXTENDED RELEASE ORAL ONCE
Status: COMPLETED | OUTPATIENT
Start: 2024-07-14 | End: 2024-07-14

## 2024-07-14 RX ORDER — SODIUM,POTASSIUM PHOSPHATES 280-250MG
2 POWDER IN PACKET (EA) ORAL
Status: COMPLETED | OUTPATIENT
Start: 2024-07-14 | End: 2024-07-14

## 2024-07-14 RX ADMIN — AMPICILLIN SODIUM AND SULBACTAM SODIUM 3 G: 2; 1 INJECTION, POWDER, FOR SOLUTION INTRAMUSCULAR; INTRAVENOUS at 04:07

## 2024-07-14 RX ADMIN — FUROSEMIDE 40 MG: 40 TABLET ORAL at 09:07

## 2024-07-14 RX ADMIN — ASPIRIN 81 MG: 81 TABLET, COATED ORAL at 09:07

## 2024-07-14 RX ADMIN — APIXABAN 2.5 MG: 2.5 TABLET, FILM COATED ORAL at 09:07

## 2024-07-14 RX ADMIN — ATORVASTATIN CALCIUM 20 MG: 20 TABLET, FILM COATED ORAL at 09:07

## 2024-07-14 RX ADMIN — DOXYCYCLINE HYCLATE 100 MG: 100 TABLET, COATED ORAL at 09:07

## 2024-07-14 RX ADMIN — APIXABAN 2.5 MG: 2.5 TABLET, FILM COATED ORAL at 08:07

## 2024-07-14 RX ADMIN — AMPICILLIN SODIUM AND SULBACTAM SODIUM 3 G: 2; 1 INJECTION, POWDER, FOR SOLUTION INTRAMUSCULAR; INTRAVENOUS at 10:07

## 2024-07-14 RX ADMIN — CHOLECALCIFEROL TAB 25 MCG (1000 UNIT) 2000 UNITS: 25 TAB at 09:07

## 2024-07-14 RX ADMIN — POTASSIUM & SODIUM PHOSPHATES POWDER PACK 280-160-250 MG 2 PACKET: 280-160-250 PACK at 10:07

## 2024-07-14 RX ADMIN — AMPICILLIN SODIUM AND SULBACTAM SODIUM 3 G: 2; 1 INJECTION, POWDER, FOR SOLUTION INTRAMUSCULAR; INTRAVENOUS at 05:07

## 2024-07-14 RX ADMIN — POTASSIUM CHLORIDE 40 MEQ: 1500 TABLET, EXTENDED RELEASE ORAL at 10:07

## 2024-07-14 RX ADMIN — POTASSIUM & SODIUM PHOSPHATES POWDER PACK 280-160-250 MG 2 PACKET: 280-160-250 PACK at 05:07

## 2024-07-14 RX ADMIN — METOPROLOL SUCCINATE 100 MG: 50 TABLET, EXTENDED RELEASE ORAL at 09:07

## 2024-07-14 NOTE — CONSULTS
RD consulted for low protein, recommendations provided below. Boost/ Bene protein added. If further nutrition warranted, re-consult as needed.    Recommendations  Continue Cardiac diet-Fluid per MD  Continue Boost GC BID/Bene protein for addition of protein/calorie intake  RD following

## 2024-07-14 NOTE — PLAN OF CARE
Problem: Adult Inpatient Plan of Care  Goal: Plan of Care Review  Outcome: Not Progressing  Goal: Optimal Comfort and Wellbeing  Outcome: Not Progressing     Problem: Infection  Goal: Absence of Infection Signs and Symptoms  Outcome: Not Progressing     AAOx4. Potassium 3.4 and supplemented. Creatinine 1.6 from 1.3 Continues with iv abx.

## 2024-07-14 NOTE — PLAN OF CARE
Problem: Adult Inpatient Plan of Care  Goal: Plan of Care Review  Outcome: Progressing  Goal: Optimal Comfort and Wellbeing  Outcome: Progressing     Problem: Acute Kidney Injury/Impairment  Goal: Fluid and Electrolyte Balance  Outcome: Progressing  Goal: Improved Oral Intake  Outcome: Progressing  Goal: Effective Renal Function  Outcome: Progressing     Problem: Skin Injury Risk Increased  Goal: Skin Health and Integrity  Outcome: Progressing     Problem: Fall Injury Risk  Goal: Absence of Fall and Fall-Related Injury  Outcome: Progressing     Problem: Skin or Soft Tissue Infection  Goal: Absence of Infection Signs and Symptoms  Outcome: Progressing

## 2024-07-14 NOTE — NURSING
"Pt reports that she had previous RN take off her compression stockings. Pt refuses to wear it again despite education. Pt verbalized "It's too tight and I am not comfortable.". Pt agreed to elevate bilateral lower extremities w/ 2 pillows.  "

## 2024-07-14 NOTE — NURSING
Pt complains of 7/10 throbbing headache (occasional, specifically frontal and on the back of the head). Pt requesting for Tylenol. MD Kisha informed via secured chat.

## 2024-07-14 NOTE — PROGRESS NOTES
[facility] ([place] [address])  Infectious Disease  Progress Note    Patient Name: Misa Barajas  MRN: [id number]  Admission Date: 7/12/2024  Length of Stay: 2 days  Attending Physician: Jarad Dueñas MD  Primary Care Provider: Celia Carlisle MD    Isolation Status: No active isolations  Assessment/Plan:      ID  Gram-negative bacteremia - Pasturella multocida    78 year old woman with multiple co-morbidities (see HPI) presented with LUE pain/swelling/cellulitis and gram negative bacteremia.   Reports cat scratch to left wrist approximately two days prior to onset of symptoms.    Xray of LUE showed soft tissue swelling, no fracture, no foreign bodies, no effusion.   Venous US was negative for DVT.  Blood cultures of 7/11 now positive for Pasturella multocida.      Repeat blood cultures of 7/12 were collected, but per Micro, not received.  Repeats ordered.      Significant clinical improvement from yesterday in LUE erythema, swelling, and pain.   Currently on IV unasyn and doxy.   Afebrile, leukocytosis has resolved.     Plan/recommendations:   Continue IV unasyn for now.  Dose adjusted to q 8 hours for renal function (cr cl now 27).     Discontinue oral doxycycline    Continued judicious LUE elevation - hand above elbow above shoulder.    Follow up repeat blood cultures.      Will follow up tomorrow with further recommendations    Data reviewed and plan discussed with, ID staff, Dr. Spencer  Discussed with Primary Team, Dr. Dueñas      Thank you.   Please Secure Chat for any questions or concerns.  ESEQUIEL Sanchez, ANP-C  Infectious Disease     I spent a total of 50 minutes on the day of the visit.This includes face to face time and non-face to face time preparing to see the patient (eg, review of tests), obtaining and/or reviewing separately obtained history, documenting clinical information in the electronic or other health record, independently interpreting results and communicating results to  the patient/family/caregiver, or care coordinator.     Subjective:     Principal Problem:Cellulitis    HPI: Ms. Misa Barajas is an 78 year old with HTN, HFpEF, COPD on Home O2, pAF s/p ablation on 6/17/24, remote history of breast CA who presented to ED on 7/11 with left arem pain and swelling with rash from elbow to 5th finger. She was diagnosed with erysipelas, given cefadroxil, and discharged.  Called back to ED when a blood culture turned + for GNR.  LUE swelling reportedly started 7-9 days ago.  Developed rash, leaking clear fluid.  She did note she had a cat scratch on wrist a couple of days ago.  An Xray of LUE showed soft tissue swelling, no fracture, no foreign bodies, no effusion.   A venous US was negative.  Blood cultures of 7/11 with one bottle with GNR.  ID pending.  Rapid PCR negative.  Repeats of 7/12 are pending.  She is afebrile, WBC 15.  Currently on doxy and zosyn.  ID is consulted for GNR bacteremia  Interval History: No acute events overnight. Afebrile. No leukocytosis  Significant clinical improvement in cellulitis/swelling   Blood cultures + for Pasturella mulocida - beta-lactamase negative.   Repeat blood cultures of 7/12 not received by Micro.  Re-ordered    Review of Systems   Constitutional:  Negative for activity change, appetite change, chills, diaphoresis, fatigue and fever.   Cardiovascular:  Positive for leg swelling.   Musculoskeletal:  Positive for arthralgias and joint swelling.   Skin:  Positive for color change and rash.   Hematological:  Bruises/bleeds easily.   All other systems reviewed and are negative.    Objective:     Vital Signs (Most Recent):  Temp: 97.7 °F (36.5 °C) (07/14/24 1213)  Pulse: 63 (07/14/24 1213)  Resp: 18 (07/14/24 1213)  BP: 121/70 (07/14/24 1213)  SpO2: 98 % (07/14/24 1213) Vital Signs (24h Range):  Temp:  [97.5 °F (36.4 °C)-98.3 °F (36.8 °C)] 97.7 °F (36.5 °C)  Pulse:  [] 63  Resp:  [16-19] 18  SpO2:  [95 %-98 %] 98 %  BP: (100-133)/(50-70)  121/70     Weight: 75.8 kg (167 lb)  Body mass index is 29.58 kg/m².    Estimated Creatinine Clearance: 27.4 mL/min (A) (based on SCr of 1.6 mg/dL (H)).     Physical Exam  Vitals and nursing note reviewed.   Constitutional:       General: She is not in acute distress.     Appearance: Normal appearance. She is not ill-appearing, toxic-appearing or diaphoretic.   HENT:      Head: Normocephalic and atraumatic.      Mouth/Throat:      Mouth: Mucous membranes are moist.   Eyes:      General: No scleral icterus.     Conjunctiva/sclera: Conjunctivae normal.   Cardiovascular:      Rate and Rhythm: Normal rate and regular rhythm.   Pulmonary:      Effort: Pulmonary effort is normal. No respiratory distress.   Abdominal:      General: There is no distension.      Palpations: Abdomen is soft.      Tenderness: There is no abdominal tenderness.   Musculoskeletal:      Cervical back: Normal range of motion.      Right lower leg: Edema present.      Left lower leg: Edema present.   Skin:     General: Skin is warm and dry.      Findings: Erythema and rash present.      Comments: Significant decrease in erythema and swelling of left arm/hand.  Mild warmth.  Less TTP     Neurological:      Mental Status: She is alert and oriented to person, place, and time.   Psychiatric:         Mood and Affect: Mood normal.         Behavior: Behavior normal.          Significant Labs: Blood Culture:   Recent Labs   Lab 05/07/24  2251 05/09/24  1433 05/09/24  1434 07/11/24  1000 07/11/24  1046   LABBLOO No growth after 5 days.  Gram stain aer bottle: Gram positive cocci in clusters resembling Staph  Results called to and read back by:Josephine Minor LPN 05/09/2024  13:59  MICROCOCCUS SPECIES  Organism is a probable contaminant  * No growth after 5 days. No growth after 5 days. Gram stain rossi bottle: Gram negative rods  Results called to and read back by:Gaviota Viera RN 07/12/2024 09:14  PASTEURELLA MULTOCIDA  Beta Lactamase negative  * No  Growth to date  No Growth to date  No Growth to date  No Growth to date     CBC:   Recent Labs   Lab 07/13/24  0519 07/13/24  1118 07/14/24  0400   WBC 11.15 10.25 6.92   HGB 11.7* 11.4* 10.6*   HCT 38.0 37.3 33.9*    176 177     CMP:   Recent Labs   Lab 07/12/24  1517 07/13/24  0519 07/14/24  0400    137 144   K 3.9 3.4* 3.4*    106 108   CO2 24 23 25   GLU 88 69* 134*   BUN 23 24* 23   CREATININE 1.2 1.3 1.6*   CALCIUM 10.5 9.1 9.6   PROT 7.0 5.3* 5.4*   ALBUMIN 2.8* 2.1* 2.0*   BILITOT 1.6* 1.4* 0.9   ALKPHOS 125 92 92   AST 53* 29 25   ALT 25 18 16   ANIONGAP 13 8 11     Microbiology Results (last 7 days)       Procedure Component Value Units Date/Time    Blood culture [[id number]] Collected: 07/14/24 1006    Order Status: Sent Specimen: Blood Updated: 07/14/24 1017    Narrative:      Rt hand    Blood culture [[id number]] Collected: 07/14/24 1006    Order Status: Sent Specimen: Blood Updated: 07/14/24 1017    Narrative:      Rt AC    Urine culture [[id number]] Collected: 07/12/24 1933    Order Status: Completed Specimen: Urine Updated: 07/14/24 0545     Urine Culture, Routine No significant growth    Narrative:      Specimen Source->Urine    Blood culture #1 **CANNOT BE ORDERED STAT** [[id number]] Collected: 07/12/24 1517    Order Status: Sent Specimen: Blood from Peripheral, Forearm, Right     Blood culture #2 **CANNOT BE ORDERED STAT** [[id number]] Collected: 07/12/24 1516    Order Status: Sent Specimen: Blood from Peripheral, Antecubital, Right             Significant Imaging: None

## 2024-07-14 NOTE — ASSESSMENT & PLAN NOTE
presents with  left sided painful erythematous rash which extends from her medial elbow to her fifth digit.  was seen at the ER on 7/12/24 and diagnosed with Erysipelas and discharged on antibiotics outpatient. She e was called back after GNR's were seen on her 1/2 blood cultuers     ER course - X ray left elbow -diffuse soft tissue swelling identified. Visualized osseous structures appear unremarkable, with no evidence of recent or healing fracture, lytic destructive process, or appreciable arthritic change noted. No elbow joint effusion is demonstrated. No radiopaque soft tissue foreign body or abnormal gas collection within the soft tissues is appreciated. US LUE -No thrombus in central veins of the left upper extremity.  Repeat BCX 2 . started on IV Zosyn     ID evaluation . switched to unasyn. continue doxycycline

## 2024-07-14 NOTE — ASSESSMENT & PLAN NOTE
resolved   Recent Labs   Lab 07/13/24  1118 07/14/24  0400 07/15/24  0547   WBC 10.25 6.92 6.34     . Afebrile. BCX 2 likely secondary to sepsis..

## 2024-07-14 NOTE — SUBJECTIVE & OBJECTIVE
Interval History: No acute events overnight. Afebrile. No leukocytosis  Significant clinical improvement in cellulitis/swelling   Blood cultures + for Pasturella mulocida - beta-lactamase negative.   Repeat blood cultures of 7/12 not received by Micro.  Re-ordered    Review of Systems   Constitutional:  Negative for activity change, appetite change, chills, diaphoresis, fatigue and fever.   Cardiovascular:  Positive for leg swelling.   Musculoskeletal:  Positive for arthralgias and joint swelling.   Skin:  Positive for color change and rash.   Hematological:  Bruises/bleeds easily.   All other systems reviewed and are negative.    Objective:     Vital Signs (Most Recent):  Temp: 97.7 °F (36.5 °C) (07/14/24 1213)  Pulse: 63 (07/14/24 1213)  Resp: 18 (07/14/24 1213)  BP: 121/70 (07/14/24 1213)  SpO2: 98 % (07/14/24 1213) Vital Signs (24h Range):  Temp:  [97.5 °F (36.4 °C)-98.3 °F (36.8 °C)] 97.7 °F (36.5 °C)  Pulse:  [] 63  Resp:  [16-19] 18  SpO2:  [95 %-98 %] 98 %  BP: (100-133)/(50-70) 121/70     Weight: 75.8 kg (167 lb)  Body mass index is 29.58 kg/m².    Estimated Creatinine Clearance: 27.4 mL/min (A) (based on SCr of 1.6 mg/dL (H)).     Physical Exam  Vitals and nursing note reviewed.   Constitutional:       General: She is not in acute distress.     Appearance: Normal appearance. She is not ill-appearing, toxic-appearing or diaphoretic.   HENT:      Head: Normocephalic and atraumatic.      Mouth/Throat:      Mouth: Mucous membranes are moist.   Eyes:      General: No scleral icterus.     Conjunctiva/sclera: Conjunctivae normal.   Cardiovascular:      Rate and Rhythm: Normal rate and regular rhythm.   Pulmonary:      Effort: Pulmonary effort is normal. No respiratory distress.   Abdominal:      General: There is no distension.      Palpations: Abdomen is soft.      Tenderness: There is no abdominal tenderness.   Musculoskeletal:      Cervical back: Normal range of motion.      Right lower leg: Edema  present.      Left lower leg: Edema present.   Skin:     General: Skin is warm and dry.      Findings: Erythema and rash present.      Comments: Significant decrease in erythema and swelling of left arm/hand.  Mild warmth.  Less TTP     Neurological:      Mental Status: She is alert and oriented to person, place, and time.   Psychiatric:         Mood and Affect: Mood normal.         Behavior: Behavior normal.          Significant Labs: Blood Culture:   Recent Labs   Lab 05/07/24  2251 05/09/24  1433 05/09/24  1434 07/11/24  1000 07/11/24  1046   LABBLOO No growth after 5 days.  Gram stain aer bottle: Gram positive cocci in clusters resembling Staph  Results called to and read back by:Josephine Minor LPN 05/09/2024  13:59  MICROCOCCUS SPECIES  Organism is a probable contaminant  * No growth after 5 days. No growth after 5 days. Gram stain rossi bottle: Gram negative rods  Results called to and read back by:Gaviota Viera RN 07/12/2024 09:14  PASTEURELLA MULTOCIDA  Beta Lactamase negative  * No Growth to date  No Growth to date  No Growth to date  No Growth to date     CBC:   Recent Labs   Lab 07/13/24  0519 07/13/24  1118 07/14/24  0400   WBC 11.15 10.25 6.92   HGB 11.7* 11.4* 10.6*   HCT 38.0 37.3 33.9*    176 177     CMP:   Recent Labs   Lab 07/12/24  1517 07/13/24  0519 07/14/24  0400    137 144   K 3.9 3.4* 3.4*    106 108   CO2 24 23 25   GLU 88 69* 134*   BUN 23 24* 23   CREATININE 1.2 1.3 1.6*   CALCIUM 10.5 9.1 9.6   PROT 7.0 5.3* 5.4*   ALBUMIN 2.8* 2.1* 2.0*   BILITOT 1.6* 1.4* 0.9   ALKPHOS 125 92 92   AST 53* 29 25   ALT 25 18 16   ANIONGAP 13 8 11     Microbiology Results (last 7 days)       Procedure Component Value Units Date/Time    Blood culture [[id number]] Collected: 07/14/24 1006    Order Status: Sent Specimen: Blood Updated: 07/14/24 1017    Narrative:      Rt hand    Blood culture [[id number]] Collected: 07/14/24 1006    Order Status: Sent Specimen: Blood Updated:  07/14/24 1017    Narrative:      Rt AC    Urine culture [[id number]] Collected: 07/12/24 1933    Order Status: Completed Specimen: Urine Updated: 07/14/24 0545     Urine Culture, Routine No significant growth    Narrative:      Specimen Source->Urine    Blood culture #1 **CANNOT BE ORDERED STAT** [[id number]] Collected: 07/12/24 1517    Order Status: Sent Specimen: Blood from Peripheral, Forearm, Right     Blood culture #2 **CANNOT BE ORDERED STAT** [[id number]] Collected: 07/12/24 1516    Order Status: Sent Specimen: Blood from Peripheral, Antecubital, Right             Significant Imaging: None

## 2024-07-14 NOTE — PROGRESS NOTES
Pharmacist Renal Dose Adjustment Note    Misa Barajas is a 78 y.o. female being treated with the medication ampicillin-sulbactam    Patient Data:    Vital Signs (Most Recent):  Temp: 98.3 °F (36.8 °C) (07/14/24 0723)  Pulse: 67 (07/14/24 0723)  Resp: 18 (07/14/24 0723)  BP: (!) 117/57 (07/14/24 0723)  SpO2: 95 % (07/14/24 0723) Vital Signs (72h Range):  Temp:  [97.5 °F (36.4 °C)-98.3 °F (36.8 °C)]   Pulse:  []   Resp:  [16-22]   BP: (100-148)/(50-72)   SpO2:  [95 %-100 %]      Recent Labs   Lab 07/12/24  1517 07/13/24  0519 07/14/24  0400   CREATININE 1.2 1.3 1.6*     Serum creatinine: 1.6 mg/dL (H) 07/14/24 0400  Estimated creatinine clearance: 27.4 mL/min (A)    Medication: ampicillin-sulbactam 3 g q6h will be changed to q8h    Pharmacist's Name: Payton Gonsales  Pharmacist's Extension: [phone]

## 2024-07-14 NOTE — ASSESSMENT & PLAN NOTE
Creatine stable for now. BMP reviewed- noted Estimated Creatinine Clearance: 31.3 mL/min (based on SCr of 1.4 mg/dL). according to latest data. Monitor UOP and serial BMP and adjust therapy as needed. Renally dose meds.    Recent Labs   Lab 07/13/24  0519 07/14/24  0400 07/15/24  0547   BUN 24* 23 23   CREATININE 1.3 1.6* 1.4       I & O     Intake/Output Summary (Last 24 hours) at 7/15/2024 0744  Last data filed at 7/15/2024 0620  Gross per 24 hour   Intake 660 ml   Output --   Net 660 ml    7/14 Cr 1.2-->1.6. hold lasix for now

## 2024-07-14 NOTE — ASSESSMENT & PLAN NOTE
78 year old woman with multiple co-morbidities (see HPI) presented with LUE pain/swelling/cellulitis and gram negative bacteremia.   Reports cat scratch to left wrist approximately two days prior to onset of symptoms.    Xray of LUE showed soft tissue swelling, no fracture, no foreign bodies, no effusion.   Venous US was negative for DVT.  Blood cultures of 7/11 now positive for Pasturella multocida.      Repeat blood cultures of 7/12 were collected, but per Micro, not received.  Repeats ordered.      Significant clinical improvement from yesterday in LUE erythema, swelling, and pain.   Currently on IV unasyn and doxy.   Afebrile, leukocytosis has resolved.     Plan/recommendations:   Continue IV unasyn for now.  Dose adjusted to q 8 hours for renal function (cr cl now 27).     Discontinue oral doxycycline    Continued judicious LUE elevation - hand above elbow above shoulder.    Follow up repeat blood cultures.      Will follow up tomorrow with further recommendations    Data reviewed and plan discussed with, ID staff, Dr. Spencer  Discussed with Primary Team, Dr. Dueñas

## 2024-07-14 NOTE — ASSESSMENT & PLAN NOTE
GNR's were seen on her 1/2 blood cultures on 7/11. repeat BC x2   7/14 BC 7/11 with Pasteurella multocida . BCX 2 7/12 lost. repeat BCX 2 today .

## 2024-07-14 NOTE — PLAN OF CARE
[facility] ([place] [address])  Initial Discharge Assessment       Primary Care Provider: Celia Carlisle MD    Admission Diagnosis: Paroxysmal atrial fibrillation [I48.0]  Gram-negative bacteremia [R78.81]  Chronic anticoagulation [Z79.01]  Chronic respiratory failure with hypoxia [J96.11]  Essential hypertension [I10]  Diastolic CHF, chronic [I50.32]  Chest pain [R07.9]  Cellulitis of other specified site [L03.818]  Anemia, unspecified type [D64.9]  Hyperlipidemia, unspecified hyperlipidemia type [E78.5]  Stage 3b chronic kidney disease [N18.32]    Admission Date: 7/12/2024  Expected Discharge Date: 7/15/2024    Transition of Care Barriers: (P) None    Payor: [employer] MEDICARE ADVANTAGE / Plan: [employer] MCARE ADV / Product Type: Medicare Advantage /     Extended Emergency Contact Information  Primary Emergency Contact: YasmaniMarina  Address: [address] [place] [address]  Home Phone: [phone]  Mobile Phone: [phone]  Relation: Daughter  Secondary Emergency Contact: Esther Barajas  Mobile Phone: [phone]  Relation: Daughter    Discharge Plan A: (P) Home with family, Home Health  Discharge Plan B: (P) Home with family      [facility]  [address] [address]  [facility] [address]  Phone: [phone] Fax: [phone]      Initial Assessment (most recent)       Adult Discharge Assessment - 07/13/24 0900          Discharge Assessment    Assessment Type Discharge Planning Assessment (P)      Confirmed/corrected address, phone number and insurance Yes (P)      Confirmed Demographics Correct on Facesheet (P)      Source of Information patient;family (P)      Communicated NOHEMI with patient/caregiver Yes (P)      People in Home alone (P)    Daughters live nearby and support as needed    Do you expect to return to your current living situation? Yes (P)      Do you have help at home or someone to help you manage your care at home? Yes  (P)      Who are your caregiver(s) and their phone number(s)? Marina Gruber (Daughter)  [phone] and daughter Esther (P)      Prior to hospitilization cognitive status: Alert/Oriented (P)      Current cognitive status: Alert/Oriented (P)      Walking or Climbing Stairs Difficulty yes (P)      Walking or Climbing Stairs ambulation difficulty, requires equipment (P)      Mobility Management rollator (P)      Dressing/Bathing Difficulty yes (P)      Dressing/Bathing bathing difficulty, requires equipment (P)      Dressing/Bathing Management shower chair (P)      Home Accessibility wheelchair accessible (P)      Home Layout Able to live on 1st floor (P)      Equipment Currently Used at Home rollator;shower chair;bedside commode;raised toilet (P)      Readmission within 30 days? No (P)      Patient currently being followed by outpatient case management? No (P)      Do you currently have service(s) that help you manage your care at home? Yes (P)      Name and Contact number of agency [facility] (P)      Is the pt/caregiver preference to resume services with current agency Yes (P)      Do you take prescription medications? Yes (P)      Do you have prescription coverage? Yes (P)      Coverage [facility] MC[facility]V - (P)      Do you have any problems affording any of your prescribed medications? No (P)      Is the patient taking medications as prescribed? yes (P)      Who is going to help you get home at discharge? Marina Gruber (Daughter)  [phone] (P)      How do you get to doctors appointments? family or friend will provide (P)      Are you on dialysis? No (P)      Do you take coumadin? No (P)      Discharge Plan A Home with family;Home Health (P)      Discharge Plan B Home with family (P)      DME Needed Upon Discharge  none (P)      Discharge Plan discussed with: Patient;Adult children (P)      Transition of Care Barriers None (P)         Physical Activity    On average, how many  days per week do you engage in moderate to strenuous exercise (like a brisk walk)? 7 days (P)      On average, how many minutes do you engage in exercise at this level? 30 min (P)         Financial Resource Strain    How hard is it for you to pay for the very basics like food, housing, medical care, and heating? Not very hard (P)         Housing Stability    In the last 12 months, was there a time when you were not able to pay the mortgage or rent on time? No (P)      At any time in the past 12 months, were you homeless or living in a shelter (including now)? No (P)         Transportation Needs    Has the lack of transportation kept you from medical appointments, meetings, work or from getting things needed for daily living? No (P)         Food Insecurity    Within the past 12 months, you worried that your food would run out before you got the money to buy more. Never true (P)      Within the past 12 months, the food you bought just didn't last and you didn't have money to get more. Never true (P)         Stress    Do you feel stress - tense, restless, nervous, or anxious, or unable to sleep at night because your mind is troubled all the time - these days? To some extent (P)         Social Isolation    How often do you feel lonely or isolated from those around you?  Never (P)         Alcohol Use    Q1: How often do you have a drink containing alcohol? Never (P)      Q2: How many drinks containing alcohol do you have on a typical day when you are drinking? Patient does not drink (P)      Q3: How often do you have six or more drinks on one occasion? Never (P)         Lending Club    In the past 12 months has the electric, gas, oil, or water company threatened to shut off services in your home? No (P)         Health Literacy    How often do you need to have someone help you when you read instructions, pamphlets, or other written material from your doctor or pharmacy? Never (P)                  Discharge Plan A and Plan B  have been determined by review of patient's clinical status, future medical and therapeutic needs, and coverage/benefits for post-acute care in coordination with multidisciplinary team members.                           SULEIMAN Phillips, LMSW  [facility]  Case Management  Ext. [phone]

## 2024-07-14 NOTE — PROGRESS NOTES
[facility] ([address] Medicine  Progress Note    Patient Name: Misa Barajas  MRN: [id number]  Patient Class: IP- Inpatient   Admission Date: 7/12/2024  Length of Stay: 2 days  Attending Physician: Jarad Dueñas MD  Primary Care Provider: Celia Carlisle MD        Subjective:     Principal Problem:Cellulitis        HPI:  Misa Barajas is a 78 y.o. female with a PMHx of HTN, HLD, HFpEF, COPD on home O2 (2L NC baseline O2 sat 92%) and pAF presents with  left sided painful erythematous rash which extends from her medial elbow to her fifth digit.     AAOX3. accompanied by daughters at the bedside. patient was seen at the ER on 7/12/24 and diagnosed with Erysipelas and discharged on cefadroxil outpatient. She e was called back after GNR's were seen on her 1/2 blood cultuers.  c/o  small bout of diarrhea . denies new symptoms. reports LUE  swelling started 7-9 days ago when she noticed her arm was swelling and leaking clear fluid - associated with elbow was red and tender.  reports that her cat scratched her near left wrist a couple of days ago. c/o  subjective fever.       ER course - X ray left elbow -diffuse soft tissue swelling identified. Visualized osseous structures appear unremarkable, with no evidence of recent or healing fracture, lytic destructive process, or appreciable arthritic change noted. No elbow joint effusion is demonstrated. No radiopaque soft tissue foreign body or abnormal gas collection within the soft tissues is appreciated. US LUE -No thrombus in central veins of the left upper extremity.  Repeat BCX 2 . started on IV Zosyn     last admission 6/27 - had successful RFA of the CTI for treatment of CTI-dependent atrial flutter. No evidence of intra- or post-procedure complications. Post-ablation ECG shows NSR, and no acute abnormalities.       Overview/Hospital Course:  7/13 K replaced. Nutrition consulted for low albumin. LE stockings and elevate LUE. lasix  40mg BID PRN. switched to unasyn   7/14 BC 7/11 with Pasteurella multocida . BCX 2 7/12 lost. repeat BCX 2 today . Cr 1.2-->1.6. hold lasix for now        Review of Systems:   Pain scale:    Constitutional:  fever,  chills, headache, vision loss, hearing loss, weight loss, Generalized weakness, falls, loss of smell, loss of taste, poor appetite,  sore throat  Respiratory: cough, shortness of breath.   Cardiovascular: chest pain, dizziness, palpitations, orthopnea, swelling of feet, syncope  Gastrointestinal: nausea, vomiting, abdominal pain, diarrhea, black stool,  blood in stool, change in bowel habits, constipation  Genitourinary: hematuria, dysuria, urgency, frequency  Integument/Breast: rash,  pruritis, erythema - improved   Hematologic/Lymphatic: easy bruising, lymphadenopathy  Musculoskeletal: arthralgias , myalgias, back pain, neck pain, knee pain  Neurological: confusion, seizures, tremors, slurred speech  Behavioral/Psych:  depression, anxiety, auditory or visual hallucinations     OBJECTIVE:     Physical Exam:  Body mass index is 29.58 kg/m².    Constitutional: Appears well-developed and well-nourished.   Head: Normocephalic and atraumatic.   Neck: Normal range of motion. Neck supple.   Cardiovascular: Normal heart rate.  Regular heart rhythm.  Pulmonary/Chest: Effort normal.   Abdominal: No distension.  No tenderness  Musculoskeletal: Normal range of motion.  erythematous rash over left upper extremity extending from hand to mid humerus with Induration, small scratch over the volar aspect of the left forearm.   +  edema -B/L LE and LUE   Neurological: Alert and oriented to person, place, and time.   Skin: Skin is warm and dry.   Psychiatric: Normal mood and affect. Behavior is normal.       Vital Signs  Temp: 98.3 °F (36.8 °C) (07/14/24 0723)  Pulse: 67 (07/14/24 0723)  Resp: 18 (07/14/24 0723)  BP: (!) 117/57 (07/14/24 0723)  SpO2: 95 % (07/14/24 0723)     24 Hour VS Range    Temp:  [97.5 °F (36.4  "°C)-98.3 °F (36.8 °C)]   Pulse:  []   Resp:  [16-19]   BP: (100-141)/(50-68)   SpO2:  [95 %-99 %]     Intake/Output Summary (Last 24 hours) at 7/14/2024 1206  Last data filed at 7/14/2024 0432  Gross per 24 hour   Intake 180 ml   Output --   Net 180 ml           I/O This Shift:  No intake/output data recorded.    Wt Readings from Last 3 Encounters:   07/12/24 75.8 kg (167 lb)   07/11/24 75.8 kg (167 lb)   07/08/24 77.1 kg (169 lb 15.6 oz)       I have personally reviewed the vitals and recorded Intake/Output     Laboratory/Diagnostic Data:    CBC/Anemia Labs: Coags:    Recent Labs   Lab 07/13/24  0519 07/13/24  1118 07/14/24  0400   WBC 11.15 10.25 6.92   HGB 11.7* 11.4* 10.6*   HCT 38.0 37.3 33.9*    176 177   MCV 96 94 94   RDW 14.4 14.0 13.8    No results for input(s): "PT", "INR", "APTT" in the last 168 hours.     Chemistries: ABG:   Recent Labs   Lab 07/12/24  1517 07/13/24  0519 07/14/24  0400    137 144   K 3.9 3.4* 3.4*    106 108   CO2 24 23 25   BUN 23 24* 23   CREATININE 1.2 1.3 1.6*   CALCIUM 10.5 9.1 9.6   PROT 7.0 5.3* 5.4*   BILITOT 1.6* 1.4* 0.9   ALKPHOS 125 92 92   ALT 25 18 16   AST 53* 29 25   MG  --  1.9 1.9   PHOS  --  2.7 2.2*    No results for input(s): "PH", "PCO2", "PO2", "HCO3", "POCSATURATED", "BE" in the last 168 hours.     POCT Glucose: HbA1c:    No results for input(s): "POCTGLUCOSE" in the last 168 hours. Hemoglobin A1C   Date Value Ref Range Status   05/08/2024 5.3 4.0 - 5.6 % Final     Comment:     ADA Screening Guidelines:  5.7-6.4%  Consistent with prediabetes  >or=6.5%  Consistent with diabetes    High levels of fetal hemoglobin interfere with the HbA1C  assay. Heterozygous hemoglobin variants (HbS, HgC, etc)do  not significantly interfere with this assay.   However, presence of multiple variants may affect accuracy.     03/01/2024 5.4 4.0 - 5.6 % Final     Comment:     ADA Screening Guidelines:  5.7-6.4%  Consistent with prediabetes  >or=6.5%  Consistent " "with diabetes    High levels of fetal hemoglobin interfere with the HbA1C  assay. Heterozygous hemoglobin variants (HbS, HgC, etc)do  not significantly interfere with this assay.   However, presence of multiple variants may affect accuracy.     08/17/2022 5.4 4.0 - 5.6 % Final     Comment:     ADA Screening Guidelines:  5.7-6.4%  Consistent with prediabetes  >or=6.5%  Consistent with diabetes    High levels of fetal hemoglobin interfere with the HbA1C  assay. Heterozygous hemoglobin variants (HbS, HgC, etc)do  not significantly interfere with this assay.   However, presence of multiple variants may affect accuracy.          Cardiac Enzymes: Ejection Fractions:    No results for input(s): "CPK", "CPKMB", "MB", "TROPONINI" in the last 72 hours. EF   Date Value Ref Range Status   05/08/2024 55 % Final   05/04/2022 60 % Final          Recent Labs   Lab 07/12/24  1933   COLORU Yellow   APPEARANCEUA Cloudy*   PHUR 7.0   SPECGRAV 1.005   PROTEINUA Trace*   GLUCUA Negative   KETONESU Negative   BILIRUBINUA Negative   OCCULTUA Negative   NITRITE Negative   LEUKOCYTESUR 3+*   RBCUA 3   WBCUA 37*   BACTERIA Rare   SQUAMEPITHEL 5   HYALINECASTS 1       Lactate (Lactic Acid) (mmol/L)   Date Value   07/11/2024 1.8   05/21/2024 2.0   05/07/2024 1.2   04/28/2022 1.2   06/25/2021 2.1     BNP (pg/mL)   Date Value   05/07/2024 1,165 (H)   03/01/2024 1,261 (H)   05/12/2022 422 (H)   06/25/2021 178 (H)   06/23/2021 599 (H)     CRP (mg/L)   Date Value   04/09/2021 0.4   08/09/2010 0.7     Sed Rate   Date Value   04/09/2021 <2 mm/Hr   08/09/2010 26 mm/hr (H)     No results found for: "DDIMER"  Ferritin (ng/mL)   Date Value   06/23/2016 98     No results found for: "LDH"  Troponin I (ng/mL)   Date Value   05/08/2024 0.028 (H)   05/08/2024 0.031 (H)   05/07/2024 0.032 (H)   03/01/2024 0.020   05/12/2022 0.028 (H)   05/12/2022 0.029 (H)   05/12/2022 0.030 (H)   05/03/2022 0.030 (H)     Results for orders placed or performed in visit on " 02/15/23   Vitamin D   Result Value Ref Range    Vit D, 25-Hydroxy 50 30 - 96 ng/mL   Results for orders placed or performed in visit on 05/04/20   Vitamin D   Result Value Ref Range    Vit D, 25-Hydroxy 42 30 - 96 ng/mL   Results for orders placed or performed in visit on 08/22/19   Vitamin D   Result Value Ref Range    Vit D, 25-Hydroxy 44 30 - 96 ng/mL     SARS-CoV2 (COVID-19) Qualitative PCR (no units)   Date Value   08/03/2021 Not Detected   03/08/2021 Not Detected     POC Rapid COVID (no units)   Date Value   04/28/2022 Negative   06/23/2021 Negative       Microbiology labs for the last week  Microbiology Results (last 7 days)       Procedure Component Value Units Date/Time    Blood culture [[id number]] Collected: 07/14/24 1006    Order Status: Sent Specimen: Blood Updated: 07/14/24 1017    Narrative:      Rt hand    Blood culture [[id number]] Collected: 07/14/24 1006    Order Status: Sent Specimen: Blood Updated: 07/14/24 1017    Narrative:      Rt AC    Urine culture [[id number]] Collected: 07/12/24 1933    Order Status: Completed Specimen: Urine Updated: 07/14/24 0545     Urine Culture, Routine No significant growth    Narrative:      Specimen Source->Urine    Blood culture #1 **CANNOT BE ORDERED STAT** [[id number]] Collected: 07/12/24 1517    Order Status: Sent Specimen: Blood from Peripheral, Forearm, Right     Blood culture #2 **CANNOT BE ORDERED STAT** [[id number]] Collected: 07/12/24 1516    Order Status: Sent Specimen: Blood from Peripheral, Antecubital, Right             Reviewed and noted in plan where applicable- Please see chart for full lab data.    Lines/Drains:       Peripheral IV - Single Lumen 07/12/24 1511 22 G Anterior;Right Shoulder (Active)   Site Assessment Clean;Dry;Intact 07/13/24 0012   Extremity Assessment Distal to IV No abnormal discoloration 07/13/24 0012   Line Status Saline locked 07/13/24 0012   Dressing Status Clean;Dry;Intact 07/13/24 0012   Dressing Intervention Integrity  maintained 07/13/24 0012   Dressing Change Due 07/16/24 07/13/24 0012   Site Change Due 07/16/24 07/13/24 0012   Number of days: 0       Imaging      Results for orders placed during the hospital encounter of 05/07/24    Echo    Interpretation Summary    Left Ventricle: The left ventricle is normal in size. Ventricular mass is normal. Normal wall thickness. Normal wall motion. There is normal systolic function. Ejection fraction by visual approximation is 55%. Unable to assess diastolic function due to atrial fibrillation.    Right Ventricle: Normal right ventricular cavity size. Wall thickness is normal. Systolic function is mildly reduced.    Aortic Valve: The aortic valve is a trileaflet valve. There is moderate aortic valve sclerosis. There is annular calcification present. Mildly restricted motion. No stenosis.    Tricuspid Valve: There is mild to moderate regurgitation.    Pulmonary Artery: The estimated pulmonary artery systolic pressure is 44 mmHg.    IVC/SVC: Elevated venous pressure at 15 mmHg.      US Upper Extremity Veins Left  Narrative: EXAMINATION:  US UPPER EXTREMITY VEINS LEFT    CLINICAL HISTORY:  left arm swelling;    TECHNIQUE:  Duplex and color flow Doppler evaluation and dynamic compression was performed of the left upper extremity veins.    COMPARISON:  None    FINDINGS:  Central veins: The internal jugular, subclavian, and axillary veins are patent and free of thrombus.    Arm veins: The brachial, basilic, and cephalic veins are patent and compressible.    Contralateral subclavian/internal jugular veins: The right subclavian and internal jugular veins are patent and free of thrombus.    Other findings: None.  Impression: No thrombus in central veins of the left upper extremity.    Electronically signed by: Nav Rogers MD  Date:    07/11/2024  Time:    13:39  X-Ray Elbow Complete Left  Narrative: EXAMINATION:  XR ELBOW COMPLETE 3 VIEW LEFT    CLINICAL HISTORY:  left arm  swelling;    TECHNIQUE:  Three views of the left elbow were obtained, with AP, lateral, and oblique projections submitted.    COMPARISON:  No relevant comparison examinations are currently available.  Clinical information obtained from the electronic medical record indicates left arm swelling, with no recent trauma history.    FINDINGS:  There is diffuse soft tissue swelling identified.  Visualized osseous structures appear unremarkable, with no evidence of recent or healing fracture, lytic destructive process, or appreciable arthritic change noted.  No elbow joint effusion is demonstrated.  No radiopaque soft tissue foreign body or abnormal gas collection within the soft tissues is appreciated.  Impression: As above    Electronically signed by: Lobito Guillen MD  Date:    07/11/2024  Time:    12:38      Labs, Imaging, EKG and Diagnostic results from 7/14/2024 were reviewed.    Medications:  Medication list was reviewed and changes noted under Assessment/Plan.  No current facility-administered medications on file prior to encounter.     Current Outpatient Medications on File Prior to Encounter   Medication Sig Dispense Refill    acetaminophen (TYLENOL) 500 MG tablet Take 2 tablets (1,000 mg total) by mouth every 8 (eight) hours as needed for Pain. 42 tablet 0    albuterol (PROVENTIL HFA) 90 mcg/actuation inhaler Inhale 2 puffs into the lungs every 6 (six) hours as needed for Wheezing. Rescue 18 g 3    apixaban (ELIQUIS) 2.5 mg Tab Take 1 tablet (2.5 mg total) by mouth 2 (two) times a day. 60 tablet 11    aspirin (ECOTRIN) 81 MG EC tablet Take 81 mg by mouth once daily.      biotin 1 mg tablet Take 1,000 mcg by mouth once daily.      budesonide-glycopyr-formoterol (BREZTRI AEROSPHERE) 160-9-4.8 mcg/actuation HFAA Inhale 2 puffs into the lungs 2 (two) times daily. 32.1 g 3    busPIRone (BUSPAR) 5 MG Tab Take 5 mg by mouth as needed.      cefadroxil (DURICEF) 500 MG Cap Take 2 capsules (1,000 mg total) by mouth once daily.  for 7 days 14 capsule 0    fluticasone propionate (FLONASE) 50 mcg/actuation nasal spray 1 spray by Each Nostril route daily as needed (congestion).      furosemide (LASIX) 40 MG tablet Take 1 tablet (40 mg total) by mouth 2 (two) times daily. 180 tablet 3    inhalation spacing device Use as directed for inhalation. 1 Device 0    metoprolol succinate (TOPROL-XL) 100 MG 24 hr tablet Take 1 tablet (100 mg total) by mouth once daily. 90 tablet 3    rosuvastatin (CRESTOR) 5 MG tablet Take 1 tablet (5 mg total) by mouth once daily. (Patient not taking: Reported on 7/10/2024) 90 tablet 3    tamsulosin (FLOMAX) 0.4 mg Cap Take 1 capsule (0.4 mg total) by mouth once daily. (Patient not taking: Reported on 7/10/2024) 30 capsule 11    vitamin D (VITAMIN D3) 1000 units Tab Take 2 tablets (2,000 Units total) by mouth once daily. 180 tablet 3     Scheduled Medications:  Current Facility-Administered Medications   Medication Dose Route Frequency    ampicillin-sulbactam  3 g Intravenous Q6H    apixaban  2.5 mg Oral BID    aspirin  81 mg Oral Daily    atorvastatin  20 mg Oral Daily    metoprolol succinate  100 mg Oral Daily    potassium, sodium phosphates  2 packet Oral QID (AC & HS)    vitamin D  2,000 Units Oral Daily     PRN:   Current Facility-Administered Medications:     acetaminophen, 650 mg, Oral, Q6H PRN    albuterol, 2 puff, Inhalation, Q6H PRN    dextrose 10%, 12.5 g, Intravenous, PRN    dextrose 10%, 25 g, Intravenous, PRN    glucagon (human recombinant), 1 mg, Intramuscular, PRN    glucose, 16 g, Oral, PRN    glucose, 24 g, Oral, PRN    naloxone, 0.02 mg, Intravenous, PRN    sodium chloride 0.9%, 10 mL, Intravenous, Q12H PRN  Infusions:   Estimated Creatinine Clearance: 27.4 mL/min (A) (based on SCr of 1.6 mg/dL (H)).             Assessment/Plan:      * Cellulitis  presents with  left sided painful erythematous rash which extends from her medial elbow to her fifth digit.  was seen at the ER on 7/12/24 and diagnosed  with Erysipelas and discharged on antibiotics outpatient. She e was called back after GNR's were seen on her 1/2 blood cultuers     ER course - X ray left elbow -diffuse soft tissue swelling identified. Visualized osseous structures appear unremarkable, with no evidence of recent or healing fracture, lytic destructive process, or appreciable arthritic change noted. No elbow joint effusion is demonstrated. No radiopaque soft tissue foreign body or abnormal gas collection within the soft tissues is appreciated. US LUE -No thrombus in central veins of the left upper extremity.  Repeat BCX 2 . started on IV Zosyn     ID evaluation . switched to unasyn. continue doxycycline     Leucocytosis  resolved   Recent Labs   Lab 07/13/24  0519 07/13/24  1118 07/14/24  0400   WBC 11.15 10.25 6.92       . Afebrile. BCX 2 likely secondary to sepsis..      Anemia    Patient's with Normocytic anemia.. Hemoglobin stable. Etiology likely due to chronic disease .  Current CBC reviewed-    Recent Labs   Lab 07/11/24  1000   HGB 11.6*         Component Value Date/Time    MCV 93 07/11/2024 1000    RDW 13.8 07/11/2024 1000    IRON 115 06/23/2016 0947    FERRITIN 98 06/23/2016 0947     Monitor CBC and transfuse if H/H drops below 7/21.      Gram-negative bacteremia  GNR's were seen on her 1/2 blood cultures on 7/11. repeat BC x2   7/14 BC 7/11 with Pasteurella multocida . BCX 2 7/12 lost. repeat BCX 2 today .    Diastolic CHF, chronic  Patient is identified as having Diastolic (HFpEF) heart failure that is Chronic. CHF is currently controlled. Latest ECHO performed and demonstrates- Results for orders placed during the hospital encounter of 05/07/24    Echo    Interpretation Summary    Left Ventricle: The left ventricle is normal in size. Ventricular mass is normal. Normal wall thickness. Normal wall motion. There is normal systolic function. Ejection fraction by visual approximation is 55%. Unable to assess diastolic function due to atrial  fibrillation.    Right Ventricle: Normal right ventricular cavity size. Wall thickness is normal. Systolic function is mildly reduced.    Aortic Valve: The aortic valve is a trileaflet valve. There is moderate aortic valve sclerosis. There is annular calcification present. Mildly restricted motion. No stenosis.    Tricuspid Valve: There is mild to moderate regurgitation.    Pulmonary Artery: The estimated pulmonary artery systolic pressure is 44 mmHg.    IVC/SVC: Elevated venous pressure at 15 mmHg.    Hypokalemia  Patient has hypokalemia which is Acute and currently stable. Most recent potassium levels reviewed-   Lab Results   Component Value Date    K 3.4 (L) 07/13/2024   . Will continue potassium replacement per protocol and recheck repeat levels after replacement completed.     Chronic anticoagulation  continue eliquis       Chronic respiratory failure with hypoxia  HFpEF, COPD on home O2  on 2L NC baseline     Hyperlipidemia  continue statin       Paroxysmal atrial fibrillation  continue metoprolol and eliquis. last admission 6/27 - had successful RFA of the CTI for treatment of CTI-dependent atrial flutter. No evidence of intra- or post-procedure complications. Post-ablation ECG shows NSR, and no acute abnormalities.     CKD (chronic kidney disease) stage 3, GFR 30-59 ml/min  Creatine stable for now. BMP reviewed- noted Estimated Creatinine Clearance: 27.4 mL/min (A) (based on SCr of 1.6 mg/dL (H)). according to latest data. Monitor UOP and serial BMP and adjust therapy as needed. Renally dose meds.    Recent Labs   Lab 07/12/24  1517 07/13/24  0519 07/14/24  0400   BUN 23 24* 23   CREATININE 1.2 1.3 1.6*         I & O     Intake/Output Summary (Last 24 hours) at 7/14/2024 1025  Last data filed at 7/14/2024 0432  Gross per 24 hour   Intake 180 ml   Output --   Net 180 ml      7/14 Cr 1.2-->1.6. hold lasix for now    Essential hypertension  Chronic, controlled. Latest blood pressure and vitals reviewed-      Temp:  [97.8 °F (36.6 °C)-98 °F (36.7 °C)]   Pulse:  [65-68]   Resp:  [18-20]   BP: (107-109)/(52)   SpO2:  [95 %-99 %] .   Home meds for hypertension were reviewed and noted below.   Hypertension Medications               furosemide (LASIX) 40 MG tablet Take 1 tablet (40 mg total) by mouth 2 (two) times daily.    metoprolol succinate (TOPROL-XL) 100 MG 24 hr tablet Take 1 tablet (100 mg total) by mouth once daily.            While in the hospital, will manage blood pressure as follows; Continue home antihypertensive regimen    Will utilize p.r.n. blood pressure medication only if patient's blood pressure greater than 180/110 and she develops symptoms such as worsening chest pain or shortness of breath.      VTE Risk Mitigation (From admission, onward)           Ordered     apixaban tablet 2.5 mg  2 times daily         07/12/24 1654                    Discharge Planning   NOHEMI: 7/15/2024     Code Status: Full Code   Is the patient medically ready for discharge?:     Reason for patient still in hospital (select all that apply): Treatment                     Jarad Dueñas MD  Department of Hospital Medicine   [facility] - Med Surg ([place]-[facility])

## 2024-07-15 ENCOUNTER — EPISODE CHANGES (OUTPATIENT)
Dept: CARDIOLOGY | Facility: CLINIC | Age: 79
End: 2024-07-15

## 2024-07-15 LAB
ALBUMIN SERPL BCP-MCNC: 2 G/DL (ref 3.5–5.2)
ALP SERPL-CCNC: 90 U/L (ref 55–135)
ALT SERPL W/O P-5'-P-CCNC: 18 U/L (ref 10–44)
ANION GAP SERPL CALC-SCNC: 9 MMOL/L (ref 8–16)
AST SERPL-CCNC: 26 U/L (ref 10–40)
BASOPHILS # BLD AUTO: 0.06 K/UL (ref 0–0.2)
BASOPHILS NFR BLD: 0.9 % (ref 0–1.9)
BILIRUB SERPL-MCNC: 0.8 MG/DL (ref 0.1–1)
BUN SERPL-MCNC: 23 MG/DL (ref 8–23)
CALCIUM SERPL-MCNC: 9.3 MG/DL (ref 8.7–10.5)
CHLORIDE SERPL-SCNC: 109 MMOL/L (ref 95–110)
CO2 SERPL-SCNC: 26 MMOL/L (ref 23–29)
CREAT SERPL-MCNC: 1.4 MG/DL (ref 0.5–1.4)
DIFFERENTIAL METHOD BLD: ABNORMAL
EOSINOPHIL # BLD AUTO: 0.3 K/UL (ref 0–0.5)
EOSINOPHIL NFR BLD: 4.9 % (ref 0–8)
ERYTHROCYTE [DISTWIDTH] IN BLOOD BY AUTOMATED COUNT: 13.6 % (ref 11.5–14.5)
EST. GFR  (NO RACE VARIABLE): 38 ML/MIN/1.73 M^2
GLUCOSE SERPL-MCNC: 81 MG/DL (ref 70–110)
HCT VFR BLD AUTO: 34.5 % (ref 37–48.5)
HGB BLD-MCNC: 10.7 G/DL (ref 12–16)
IMM GRANULOCYTES # BLD AUTO: 0.03 K/UL (ref 0–0.04)
IMM GRANULOCYTES NFR BLD AUTO: 0.5 % (ref 0–0.5)
LYMPHOCYTES # BLD AUTO: 1.6 K/UL (ref 1–4.8)
LYMPHOCYTES NFR BLD: 25.7 % (ref 18–48)
MAGNESIUM SERPL-MCNC: 1.8 MG/DL (ref 1.6–2.6)
MCH RBC QN AUTO: 29.2 PG (ref 27–31)
MCHC RBC AUTO-ENTMCNC: 31 G/DL (ref 32–36)
MCV RBC AUTO: 94 FL (ref 82–98)
MONOCYTES # BLD AUTO: 0.8 K/UL (ref 0.3–1)
MONOCYTES NFR BLD: 12.1 % (ref 4–15)
NEUTROPHILS # BLD AUTO: 3.5 K/UL (ref 1.8–7.7)
NEUTROPHILS NFR BLD: 55.9 % (ref 38–73)
NRBC BLD-RTO: 0 /100 WBC
PHOSPHATE SERPL-MCNC: 3.2 MG/DL (ref 2.7–4.5)
PLATELET # BLD AUTO: 194 K/UL (ref 150–450)
PMV BLD AUTO: 10.3 FL (ref 9.2–12.9)
POTASSIUM SERPL-SCNC: 3.9 MMOL/L (ref 3.5–5.1)
PROT SERPL-MCNC: 5.3 G/DL (ref 6–8.4)
RBC # BLD AUTO: 3.66 M/UL (ref 4–5.4)
SODIUM SERPL-SCNC: 144 MMOL/L (ref 136–145)
WBC # BLD AUTO: 6.34 K/UL (ref 3.9–12.7)

## 2024-07-15 PROCEDURE — 80053 COMPREHEN METABOLIC PANEL: CPT | Performed by: HOSPITALIST

## 2024-07-15 PROCEDURE — 85025 COMPLETE CBC W/AUTO DIFF WBC: CPT | Performed by: HOSPITALIST

## 2024-07-15 PROCEDURE — 36415 COLL VENOUS BLD VENIPUNCTURE: CPT | Performed by: HOSPITALIST

## 2024-07-15 PROCEDURE — 21400001 HC TELEMETRY ROOM

## 2024-07-15 PROCEDURE — 83735 ASSAY OF MAGNESIUM: CPT | Performed by: HOSPITALIST

## 2024-07-15 PROCEDURE — 63600175 PHARM REV CODE 636 W HCPCS: Performed by: HOSPITALIST

## 2024-07-15 PROCEDURE — 99233 SBSQ HOSP IP/OBS HIGH 50: CPT | Mod: ,,, | Performed by: REGISTERED NURSE

## 2024-07-15 PROCEDURE — 84100 ASSAY OF PHOSPHORUS: CPT | Performed by: HOSPITALIST

## 2024-07-15 PROCEDURE — 11000001 HC ACUTE MED/SURG PRIVATE ROOM

## 2024-07-15 PROCEDURE — 25000003 PHARM REV CODE 250: Performed by: HOSPITALIST

## 2024-07-15 RX ORDER — FUROSEMIDE 40 MG/1
40 TABLET ORAL DAILY
Status: DISCONTINUED | OUTPATIENT
Start: 2024-07-15 | End: 2024-07-18 | Stop reason: HOSPADM

## 2024-07-15 RX ADMIN — AMPICILLIN SODIUM AND SULBACTAM SODIUM 3 G: 2; 1 INJECTION, POWDER, FOR SOLUTION INTRAMUSCULAR; INTRAVENOUS at 01:07

## 2024-07-15 RX ADMIN — APIXABAN 2.5 MG: 2.5 TABLET, FILM COATED ORAL at 08:07

## 2024-07-15 RX ADMIN — METOPROLOL SUCCINATE 100 MG: 50 TABLET, EXTENDED RELEASE ORAL at 12:07

## 2024-07-15 RX ADMIN — CHOLECALCIFEROL TAB 25 MCG (1000 UNIT) 2000 UNITS: 25 TAB at 09:07

## 2024-07-15 RX ADMIN — ASPIRIN 81 MG: 81 TABLET, COATED ORAL at 09:07

## 2024-07-15 RX ADMIN — AMPICILLIN SODIUM AND SULBACTAM SODIUM 3 G: 2; 1 INJECTION, POWDER, FOR SOLUTION INTRAMUSCULAR; INTRAVENOUS at 09:07

## 2024-07-15 RX ADMIN — FUROSEMIDE 40 MG: 40 TABLET ORAL at 12:07

## 2024-07-15 RX ADMIN — APIXABAN 2.5 MG: 2.5 TABLET, FILM COATED ORAL at 09:07

## 2024-07-15 RX ADMIN — AMPICILLIN SODIUM AND SULBACTAM SODIUM 3 G: 2; 1 INJECTION, POWDER, FOR SOLUTION INTRAMUSCULAR; INTRAVENOUS at 05:07

## 2024-07-15 RX ADMIN — ATORVASTATIN CALCIUM 20 MG: 20 TABLET, FILM COATED ORAL at 09:07

## 2024-07-15 NOTE — ASSESSMENT & PLAN NOTE
78 year old woman with multiple co-morbidities (see HPI) presented with LUE pain/swelling/cellulitis and gram negative bacteremia.   Reports cat scratch to left wrist approximately two days prior to onset of symptoms.    Xray of LUE showed soft tissue swelling, no fracture, no foreign bodies, no effusion.   Venous US was negative for DVT.  Blood cultures of 7/11 now positive for Pasturella multocida.  Repeat cultures from 7/12 and 7/14 are negative.     Afebrile. HDS. Continues on unasyn alone.     Plan/recommendations:   Continue IV unasyn for now.  Dose adjusted to q 8 hours for renal function (crcl now 30).     Continued judicious LUE elevation - hand above elbow above shoulder.   Awaiting sensitivities from micro, will follow up tomorrow   Plan reviewed with ID staff. ID will follow.

## 2024-07-15 NOTE — ASSESSMENT & PLAN NOTE
Patient has hypokalemia which is Acute and currently stable. Most recent potassium levels reviewed-   Lab Results   Component Value Date    K 3.9 07/15/2024   . Will continue potassium replacement per protocol and recheck repeat levels after replacement completed.

## 2024-07-15 NOTE — PROGRESS NOTES
[facility] ([address] Medicine  Progress Note    Patient Name: Misa Barajas  MRN: [id number]  Patient Class: IP- Inpatient   Admission Date: 7/12/2024  Length of Stay: 3 days  Attending Physician: Jarad Dueñas MD  Primary Care Provider: Celia Carlisel MD        Subjective:     Principal Problem:Cellulitis        HPI:  Misa Barajas is a 78 y.o. female with a PMHx of HTN, HLD, HFpEF, COPD on home O2 (2L NC baseline O2 sat 92%) and pAF presents with  left sided painful erythematous rash which extends from her medial elbow to her fifth digit.     AAOX3. accompanied by daughters at the bedside. patient was seen at the ER on 7/12/24 and diagnosed with Erysipelas and discharged on cefadroxil outpatient. She e was called back after GNR's were seen on her 1/2 blood cultuers.  c/o  small bout of diarrhea . denies new symptoms. reports LUE  swelling started 7-9 days ago when she noticed her arm was swelling and leaking clear fluid - associated with elbow was red and tender.  reports that her cat scratched her near left wrist a couple of days ago. c/o  subjective fever.       ER course - X ray left elbow -diffuse soft tissue swelling identified. Visualized osseous structures appear unremarkable, with no evidence of recent or healing fracture, lytic destructive process, or appreciable arthritic change noted. No elbow joint effusion is demonstrated. No radiopaque soft tissue foreign body or abnormal gas collection within the soft tissues is appreciated. US LUE -No thrombus in central veins of the left upper extremity.  Repeat BCX 2 . started on IV Zosyn     last admission 6/27 - had successful RFA of the CTI for treatment of CTI-dependent atrial flutter. No evidence of intra- or post-procedure complications. Post-ablation ECG shows NSR, and no acute abnormalities.       Overview/Hospital Course:  7/13 K replaced. Nutrition consulted for low albumin. LE stockings and elevate LUE. lasix  40mg BID PRN. switched to unasyn   7/14 BC 7/11 with Pasteurella multocida . BCX 2 7/12 lost. repeat BCX 2 today . Cr 1.2-->1.6. hold lasix for now  7/15 LE erythema and swelling improved. Nutritin evaluation . continue boost . Cr 1.4. continue PO lasix         Review of Systems:   Pain scale:    Constitutional:  fever,  chills, headache, vision loss, hearing loss, weight loss, Generalized weakness, falls, loss of smell, loss of taste, poor appetite,  sore throat  Respiratory: cough, shortness of breath.   Cardiovascular: chest pain, dizziness, palpitations, orthopnea, swelling of feet, syncope  Gastrointestinal: nausea, vomiting, abdominal pain, diarrhea, black stool,  blood in stool, change in bowel habits, constipation  Genitourinary: hematuria, dysuria, urgency, frequency  Integument/Breast: rash,  pruritis, erythema - improved   Hematologic/Lymphatic: easy bruising, lymphadenopathy  Musculoskeletal: arthralgias , myalgias, back pain, neck pain, knee pain  Neurological: confusion, seizures, tremors, slurred speech  Behavioral/Psych:  depression, anxiety, auditory or visual hallucinations     OBJECTIVE:     Physical Exam:  Body mass index is 29.58 kg/m².    Constitutional: Appears well-developed and well-nourished.   Head: Normocephalic and atraumatic.   Neck: Normal range of motion. Neck supple.   Cardiovascular: Normal heart rate.  Regular heart rhythm.  Pulmonary/Chest: Effort normal.   Abdominal: No distension.  No tenderness  Musculoskeletal: Normal range of motion.  erythematous rash over left upper extremity extending from hand to mid humerus with Induration, small scratch over the volar aspect of the left forearm.   +  edema -B/L LE and LUE   Neurological: Alert and oriented to person, place, and time.   Skin: Skin is warm and dry.   Psychiatric: Normal mood and affect. Behavior is normal.       Vital Signs  Temp: 97.9 °F (36.6 °C) (07/15/24 0857)  Pulse: 97 (07/15/24 0857)  Resp: 18 (07/15/24 0857)  BP:  "(!) 105/43 (07/15/24 0857)  SpO2: 98 % (07/15/24 0857)     24 Hour VS Range    Temp:  [97.9 °F (36.6 °C)-98.1 °F (36.7 °C)]   Pulse:  [45-97]   Resp:  [17-18]   BP: (105-144)/(43-73)   SpO2:  [96 %-100 %]     Intake/Output Summary (Last 24 hours) at 7/15/2024 1235  Last data filed at 7/15/2024 0620  Gross per 24 hour   Intake 660 ml   Output --   Net 660 ml           I/O This Shift:  No intake/output data recorded.    Wt Readings from Last 3 Encounters:   07/12/24 75.8 kg (167 lb)   07/11/24 75.8 kg (167 lb)   07/08/24 77.1 kg (169 lb 15.6 oz)       I have personally reviewed the vitals and recorded Intake/Output     Laboratory/Diagnostic Data:    CBC/Anemia Labs: Coags:    Recent Labs   Lab 07/13/24  1118 07/14/24  0400 07/15/24  0547   WBC 10.25 6.92 6.34   HGB 11.4* 10.6* 10.7*   HCT 37.3 33.9* 34.5*    177 194   MCV 94 94 94   RDW 14.0 13.8 13.6    No results for input(s): "PT", "INR", "APTT" in the last 168 hours.     Chemistries: ABG:   Recent Labs   Lab 07/13/24  0519 07/14/24  0400 07/15/24  0547    144 144   K 3.4* 3.4* 3.9    108 109   CO2 23 25 26   BUN 24* 23 23   CREATININE 1.3 1.6* 1.4   CALCIUM 9.1 9.6 9.3   PROT 5.3* 5.4* 5.3*   BILITOT 1.4* 0.9 0.8   ALKPHOS 92 92 90   ALT 18 16 18   AST 29 25 26   MG 1.9 1.9 1.8   PHOS 2.7 2.2* 3.2    No results for input(s): "PH", "PCO2", "PO2", "HCO3", "POCSATURATED", "BE" in the last 168 hours.     POCT Glucose: HbA1c:    No results for input(s): "POCTGLUCOSE" in the last 168 hours. Hemoglobin A1C   Date Value Ref Range Status   05/08/2024 5.3 4.0 - 5.6 % Final     Comment:     ADA Screening Guidelines:  5.7-6.4%  Consistent with prediabetes  >or=6.5%  Consistent with diabetes    High levels of fetal hemoglobin interfere with the HbA1C  assay. Heterozygous hemoglobin variants (HbS, HgC, etc)do  not significantly interfere with this assay.   However, presence of multiple variants may affect accuracy.     03/01/2024 5.4 4.0 - 5.6 % Final     " "Comment:     ADA Screening Guidelines:  5.7-6.4%  Consistent with prediabetes  >or=6.5%  Consistent with diabetes    High levels of fetal hemoglobin interfere with the HbA1C  assay. Heterozygous hemoglobin variants (HbS, HgC, etc)do  not significantly interfere with this assay.   However, presence of multiple variants may affect accuracy.     08/17/2022 5.4 4.0 - 5.6 % Final     Comment:     ADA Screening Guidelines:  5.7-6.4%  Consistent with prediabetes  >or=6.5%  Consistent with diabetes    High levels of fetal hemoglobin interfere with the HbA1C  assay. Heterozygous hemoglobin variants (HbS, HgC, etc)do  not significantly interfere with this assay.   However, presence of multiple variants may affect accuracy.          Cardiac Enzymes: Ejection Fractions:    No results for input(s): "CPK", "CPKMB", "MB", "TROPONINI" in the last 72 hours. EF   Date Value Ref Range Status   05/08/2024 55 % Final   05/04/2022 60 % Final          No results for input(s): "COLORU", "APPEARANCEUA", "PHUR", "SPECGRAV", "PROTEINUA", "GLUCUA", "KETONESU", "BILIRUBINUA", "OCCULTUA", "NITRITE", "UROBILINOGEN", "LEUKOCYTESUR", "RBCUA", "WBCUA", "BACTERIA", "SQUAMEPITHEL", "HYALINECASTS" in the last 48 hours.    Invalid input(s): "WRIGHTSUR"      Lactate (Lactic Acid) (mmol/L)   Date Value   07/11/2024 1.8   05/21/2024 2.0   05/07/2024 1.2   04/28/2022 1.2   06/25/2021 2.1     BNP (pg/mL)   Date Value   05/07/2024 1,165 (H)   03/01/2024 1,261 (H)   05/12/2022 422 (H)   06/25/2021 178 (H)   06/23/2021 599 (H)     CRP (mg/L)   Date Value   04/09/2021 0.4   08/09/2010 0.7     Sed Rate   Date Value   04/09/2021 <2 mm/Hr   08/09/2010 26 mm/hr (H)     No results found for: "DDIMER"  Ferritin (ng/mL)   Date Value   06/23/2016 98     No results found for: "LDH"  Troponin I (ng/mL)   Date Value   05/08/2024 0.028 (H)   05/08/2024 0.031 (H)   05/07/2024 0.032 (H)   03/01/2024 0.020   05/12/2022 0.028 (H)   05/12/2022 0.029 (H)   05/12/2022 0.030 (H) "   05/03/2022 0.030 (H)     Results for orders placed or performed in visit on 02/15/23   Vitamin D   Result Value Ref Range    Vit D, 25-Hydroxy 50 30 - 96 ng/mL   Results for orders placed or performed in visit on 05/04/20   Vitamin D   Result Value Ref Range    Vit D, 25-Hydroxy 42 30 - 96 ng/mL   Results for orders placed or performed in visit on 08/22/19   Vitamin D   Result Value Ref Range    Vit D, 25-Hydroxy 44 30 - 96 ng/mL     SARS-CoV2 (COVID-19) Qualitative PCR (no units)   Date Value   08/03/2021 Not Detected   03/08/2021 Not Detected     POC Rapid COVID (no units)   Date Value   04/28/2022 Negative   06/23/2021 Negative       Microbiology labs for the last week  Microbiology Results (last 7 days)       Procedure Component Value Units Date/Time    Blood culture [[id number]] Collected: 07/14/24 1006    Order Status: Completed Specimen: Blood Updated: 07/14/24 2145     Blood Culture, Routine No Growth to date    Narrative:      From 2 different sites 30 minutes apart  Rt AC    Blood culture [[id number]] Collected: 07/14/24 1006    Order Status: Completed Specimen: Blood Updated: 07/14/24 2145     Blood Culture, Routine No Growth to date    Narrative:      From 2 different sites 30 minutes apart  Rt hand    Urine culture [[id number]] Collected: 07/12/24 1933    Order Status: Completed Specimen: Urine Updated: 07/14/24 0545     Urine Culture, Routine No significant growth    Narrative:      Specimen Source->Urine    Blood culture #1 **CANNOT BE ORDERED STAT** [[id number]] Collected: 07/12/24 1517    Order Status: Sent Specimen: Blood from Peripheral, Forearm, Right     Blood culture #2 **CANNOT BE ORDERED STAT** [[id number]] Collected: 07/12/24 1516    Order Status: Sent Specimen: Blood from Peripheral, Antecubital, Right             Reviewed and noted in plan where applicable- Please see chart for full lab data.    Lines/Drains:       Peripheral IV - Single Lumen 07/12/24 1511 22 G Anterior;Right Shoulder  (Active)   Site Assessment Clean;Dry;Intact 07/13/24 0012   Extremity Assessment Distal to IV No abnormal discoloration 07/13/24 0012   Line Status Saline locked 07/13/24 0012   Dressing Status Clean;Dry;Intact 07/13/24 0012   Dressing Intervention Integrity maintained 07/13/24 0012   Dressing Change Due 07/16/24 07/13/24 0012   Site Change Due 07/16/24 07/13/24 0012   Number of days: 0       Imaging      Results for orders placed during the hospital encounter of 05/07/24    Echo    Interpretation Summary    Left Ventricle: The left ventricle is normal in size. Ventricular mass is normal. Normal wall thickness. Normal wall motion. There is normal systolic function. Ejection fraction by visual approximation is 55%. Unable to assess diastolic function due to atrial fibrillation.    Right Ventricle: Normal right ventricular cavity size. Wall thickness is normal. Systolic function is mildly reduced.    Aortic Valve: The aortic valve is a trileaflet valve. There is moderate aortic valve sclerosis. There is annular calcification present. Mildly restricted motion. No stenosis.    Tricuspid Valve: There is mild to moderate regurgitation.    Pulmonary Artery: The estimated pulmonary artery systolic pressure is 44 mmHg.    IVC/SVC: Elevated venous pressure at 15 mmHg.      US Upper Extremity Veins Left  Narrative: EXAMINATION:  US UPPER EXTREMITY VEINS LEFT    CLINICAL HISTORY:  left arm swelling;    TECHNIQUE:  Duplex and color flow Doppler evaluation and dynamic compression was performed of the left upper extremity veins.    COMPARISON:  None    FINDINGS:  Central veins: The internal jugular, subclavian, and axillary veins are patent and free of thrombus.    Arm veins: The brachial, basilic, and cephalic veins are patent and compressible.    Contralateral subclavian/internal jugular veins: The right subclavian and internal jugular veins are patent and free of thrombus.    Other findings: None.  Impression: No thrombus in  central veins of the left upper extremity.    Electronically signed by: Nav Rogers MD  Date:    07/11/2024  Time:    13:39  X-Ray Elbow Complete Left  Narrative: EXAMINATION:  XR ELBOW COMPLETE 3 VIEW LEFT    CLINICAL HISTORY:  left arm swelling;    TECHNIQUE:  Three views of the left elbow were obtained, with AP, lateral, and oblique projections submitted.    COMPARISON:  No relevant comparison examinations are currently available.  Clinical information obtained from the electronic medical record indicates left arm swelling, with no recent trauma history.    FINDINGS:  There is diffuse soft tissue swelling identified.  Visualized osseous structures appear unremarkable, with no evidence of recent or healing fracture, lytic destructive process, or appreciable arthritic change noted.  No elbow joint effusion is demonstrated.  No radiopaque soft tissue foreign body or abnormal gas collection within the soft tissues is appreciated.  Impression: As above    Electronically signed by: Lobito Guillen MD  Date:    07/11/2024  Time:    12:38      Labs, Imaging, EKG and Diagnostic results from 7/15/2024 were reviewed.    Medications:  Medication list was reviewed and changes noted under Assessment/Plan.  No current facility-administered medications on file prior to encounter.     Current Outpatient Medications on File Prior to Encounter   Medication Sig Dispense Refill    acetaminophen (TYLENOL) 500 MG tablet Take 2 tablets (1,000 mg total) by mouth every 8 (eight) hours as needed for Pain. 42 tablet 0    albuterol (PROVENTIL HFA) 90 mcg/actuation inhaler Inhale 2 puffs into the lungs every 6 (six) hours as needed for Wheezing. Rescue 18 g 3    apixaban (ELIQUIS) 2.5 mg Tab Take 1 tablet (2.5 mg total) by mouth 2 (two) times a day. 60 tablet 11    aspirin (ECOTRIN) 81 MG EC tablet Take 81 mg by mouth once daily.      biotin 1 mg tablet Take 1,000 mcg by mouth once daily.      budesonide-glycopyr-formoterol (BREZTRI AEROSPHERE)  160-9-4.8 mcg/actuation HFAA Inhale 2 puffs into the lungs 2 (two) times daily. 32.1 g 3    busPIRone (BUSPAR) 5 MG Tab Take 5 mg by mouth as needed.      cefadroxil (DURICEF) 500 MG Cap Take 2 capsules (1,000 mg total) by mouth once daily. for 7 days 14 capsule 0    fluticasone propionate (FLONASE) 50 mcg/actuation nasal spray 1 spray by Each Nostril route daily as needed (congestion).      furosemide (LASIX) 40 MG tablet Take 1 tablet (40 mg total) by mouth 2 (two) times daily. 180 tablet 3    inhalation spacing device Use as directed for inhalation. 1 Device 0    metoprolol succinate (TOPROL-XL) 100 MG 24 hr tablet Take 1 tablet (100 mg total) by mouth once daily. 90 tablet 3    rosuvastatin (CRESTOR) 5 MG tablet Take 1 tablet (5 mg total) by mouth once daily. (Patient not taking: Reported on 7/10/2024) 90 tablet 3    tamsulosin (FLOMAX) 0.4 mg Cap Take 1 capsule (0.4 mg total) by mouth once daily. (Patient not taking: Reported on 7/10/2024) 30 capsule 11    vitamin D (VITAMIN D3) 1000 units Tab Take 2 tablets (2,000 Units total) by mouth once daily. 180 tablet 3     Scheduled Medications:  Current Facility-Administered Medications   Medication Dose Route Frequency    ampicillin-sulbactam  3 g Intravenous Q8H    apixaban  2.5 mg Oral BID    aspirin  81 mg Oral Daily    atorvastatin  20 mg Oral Daily    furosemide  40 mg Oral Daily    metoprolol succinate  100 mg Oral Daily    vitamin D  2,000 Units Oral Daily     PRN:   Current Facility-Administered Medications:     acetaminophen, 650 mg, Oral, Q6H PRN    albuterol, 2 puff, Inhalation, Q6H PRN    dextrose 10%, 12.5 g, Intravenous, PRN    dextrose 10%, 25 g, Intravenous, PRN    glucagon (human recombinant), 1 mg, Intramuscular, PRN    glucose, 16 g, Oral, PRN    glucose, 24 g, Oral, PRN    naloxone, 0.02 mg, Intravenous, PRN    sodium chloride 0.9%, 10 mL, Intravenous, Q12H PRN  Infusions:   Estimated Creatinine Clearance: 31.3 mL/min (based on SCr of 1.4  mg/dL).             Assessment/Plan:      * Cellulitis  presents with  left sided painful erythematous rash which extends from her medial elbow to her fifth digit.  was seen at the ER on 7/12/24 and diagnosed with Erysipelas and discharged on antibiotics outpatient. She e was called back after GNR's were seen on her 1/2 blood cultuers     ER course - X ray left elbow -diffuse soft tissue swelling identified. Visualized osseous structures appear unremarkable, with no evidence of recent or healing fracture, lytic destructive process, or appreciable arthritic change noted. No elbow joint effusion is demonstrated. No radiopaque soft tissue foreign body or abnormal gas collection within the soft tissues is appreciated. US LUE -No thrombus in central veins of the left upper extremity.  Repeat BCX 2 . started on IV Zosyn     ID evaluation . switched to unasyn. continue doxycycline     Leucocytosis  resolved   Recent Labs   Lab 07/13/24  1118 07/14/24  0400 07/15/24  0547   WBC 10.25 6.92 6.34     . Afebrile. BCX 2 likely secondary to sepsis..      Anemia    Patient's with Normocytic anemia.. Hemoglobin stable. Etiology likely due to chronic disease .  Current CBC reviewed-    Recent Labs   Lab 07/11/24  1000   HGB 11.6*         Component Value Date/Time    MCV 93 07/11/2024 1000    RDW 13.8 07/11/2024 1000    IRON 115 06/23/2016 0947    FERRITIN 98 06/23/2016 0947     Monitor CBC and transfuse if H/H drops below 7/21.      Gram-negative bacteremia - Pasturella multocida  GNR's were seen on her 1/2 blood cultures on 7/11. repeat BC x2   7/14 BC 7/11 with Pasteurella multocida . BCX 2 7/12 lost. repeat BCX 2 today .    Diastolic CHF, chronic  Patient is identified as having Diastolic (HFpEF) heart failure that is Chronic. CHF is currently controlled. Latest ECHO performed and demonstrates- Results for orders placed during the hospital encounter of 05/07/24    Echo    Interpretation Summary    Left Ventricle: The left  ventricle is normal in size. Ventricular mass is normal. Normal wall thickness. Normal wall motion. There is normal systolic function. Ejection fraction by visual approximation is 55%. Unable to assess diastolic function due to atrial fibrillation.    Right Ventricle: Normal right ventricular cavity size. Wall thickness is normal. Systolic function is mildly reduced.    Aortic Valve: The aortic valve is a trileaflet valve. There is moderate aortic valve sclerosis. There is annular calcification present. Mildly restricted motion. No stenosis.    Tricuspid Valve: There is mild to moderate regurgitation.    Pulmonary Artery: The estimated pulmonary artery systolic pressure is 44 mmHg.    IVC/SVC: Elevated venous pressure at 15 mmHg.    Hypokalemia  Patient has hypokalemia which is Acute and currently stable. Most recent potassium levels reviewed-   Lab Results   Component Value Date    K 3.9 07/15/2024   . Will continue potassium replacement per protocol and recheck repeat levels after replacement completed.     Chronic anticoagulation  continue eliquis       Chronic respiratory failure with hypoxia  HFpEF, COPD on home O2  on 2L NC baseline     Hyperlipidemia  continue statin       Paroxysmal atrial fibrillation  continue metoprolol and eliquis. last admission 6/27 - had successful RFA of the CTI for treatment of CTI-dependent atrial flutter. No evidence of intra- or post-procedure complications. Post-ablation ECG shows NSR, and no acute abnormalities.     CKD (chronic kidney disease) stage 3, GFR 30-59 ml/min  Creatine stable for now. BMP reviewed- noted Estimated Creatinine Clearance: 31.3 mL/min (based on SCr of 1.4 mg/dL). according to latest data. Monitor UOP and serial BMP and adjust therapy as needed. Renally dose meds.    Recent Labs   Lab 07/13/24  0519 07/14/24  0400 07/15/24  0547   BUN 24* 23 23   CREATININE 1.3 1.6* 1.4       I & O     Intake/Output Summary (Last 24 hours) at 7/15/2024 0744  Last data  filed at 7/15/2024 0620  Gross per 24 hour   Intake 660 ml   Output --   Net 660 ml    7/14 Cr 1.2-->1.6. hold lasix for now    Essential hypertension  Chronic, controlled. Latest blood pressure and vitals reviewed-     Temp:  [97.8 °F (36.6 °C)-98 °F (36.7 °C)]   Pulse:  [65-68]   Resp:  [18-20]   BP: (107-109)/(52)   SpO2:  [95 %-99 %] .   Home meds for hypertension were reviewed and noted below.   Hypertension Medications               furosemide (LASIX) 40 MG tablet Take 1 tablet (40 mg total) by mouth 2 (two) times daily.    metoprolol succinate (TOPROL-XL) 100 MG 24 hr tablet Take 1 tablet (100 mg total) by mouth once daily.            While in the hospital, will manage blood pressure as follows; Continue home antihypertensive regimen    Will utilize p.r.n. blood pressure medication only if patient's blood pressure greater than 180/110 and she develops symptoms such as worsening chest pain or shortness of breath.      VTE Risk Mitigation (From admission, onward)           Ordered     apixaban tablet 2.5 mg  2 times daily         07/12/24 1654                    Discharge Planning   NOHEMI: 7/17/2024     Code Status: Full Code   Is the patient medically ready for discharge?:     Reason for patient still in hospital (select all that apply): Treatment  Discharge Plan A: Home with family, Home Health                  Jarad Dueñas MD  Department of Hospital Medicine   [facility] - Med Surg ([place])

## 2024-07-15 NOTE — SUBJECTIVE & OBJECTIVE
Interval History:   Blood cultures + for Pasturella mulocida - beta-lactamase negative.   Repeat blood cultures of 7/12, repeats negative.   Swelling, redness improved.     Review of Systems   Constitutional:  Negative for activity change, appetite change, chills, diaphoresis, fatigue and fever.   Cardiovascular:  Positive for leg swelling.   Musculoskeletal:  Positive for arthralgias. Negative for joint swelling.   Skin:  Positive for rash and wound.   Hematological:  Bruises/bleeds easily.   All other systems reviewed and are negative.    Objective:     Vital Signs (Most Recent):  Temp: 98.1 °F (36.7 °C) (07/15/24 1238)  Pulse: 83 (07/15/24 1238)  Resp: 18 (07/15/24 1238)  BP: (!) 126/57 (07/15/24 1238)  SpO2: 96 % (07/15/24 1238) Vital Signs (24h Range):  Temp:  [97.9 °F (36.6 °C)-98.1 °F (36.7 °C)] 98.1 °F (36.7 °C)  Pulse:  [45-97] 83  Resp:  [17-18] 18  SpO2:  [96 %-100 %] 96 %  BP: (105-144)/(43-73) 126/57     Weight: 75.8 kg (167 lb)  Body mass index is 29.58 kg/m².    Estimated Creatinine Clearance: 31.3 mL/min (based on SCr of 1.4 mg/dL).     Physical Exam  Vitals and nursing note reviewed.   Constitutional:       General: She is not in acute distress.     Appearance: Normal appearance. She is not ill-appearing, toxic-appearing or diaphoretic.   HENT:      Head: Normocephalic and atraumatic.      Mouth/Throat:      Mouth: Mucous membranes are moist.   Eyes:      General: No scleral icterus.     Conjunctiva/sclera: Conjunctivae normal.   Cardiovascular:      Rate and Rhythm: Normal rate and regular rhythm.   Pulmonary:      Effort: Pulmonary effort is normal. No respiratory distress.   Abdominal:      General: There is no distension.      Palpations: Abdomen is soft.      Tenderness: There is no abdominal tenderness.   Musculoskeletal:      Cervical back: Normal range of motion.      Right lower leg: Edema present.      Left lower leg: Edema present.   Skin:     General: Skin is warm and dry.      Findings:  Erythema and rash present.      Comments: Significant decrease in erythema and swelling of left arm/hand.  Mild warmth.  Less TTP     Neurological:      General: No focal deficit present.      Mental Status: She is alert and oriented to person, place, and time.   Psychiatric:         Mood and Affect: Mood normal.         Behavior: Behavior normal.                      Significant Labs: Blood Culture:   Recent Labs   Lab 05/09/24  1433 05/09/24  1434 07/11/24  1000 07/11/24  1046 07/14/24  1006   LABBLOO No growth after 5 days. No growth after 5 days. Gram stain rossi bottle: Gram negative rods  Results called to and read back by:Gaviota Viera RN 07/12/2024 09:14  PASTEURELLA MULTOCIDA  Beta Lactamase negative  * No Growth to date  No Growth to date  No Growth to date  No Growth to date  No Growth to date No Growth to date  No Growth to date     CBC:   Recent Labs   Lab 07/14/24  0400 07/15/24  0547   WBC 6.92 6.34   HGB 10.6* 10.7*   HCT 33.9* 34.5*    194     CMP:   Recent Labs   Lab 07/14/24  0400 07/15/24  0547    144   K 3.4* 3.9    109   CO2 25 26   * 81   BUN 23 23   CREATININE 1.6* 1.4   CALCIUM 9.6 9.3   PROT 5.4* 5.3*   ALBUMIN 2.0* 2.0*   BILITOT 0.9 0.8   ALKPHOS 92 90   AST 25 26   ALT 16 18   ANIONGAP 11 9     Microbiology Results (last 7 days)       Procedure Component Value Units Date/Time    Blood culture [[id number]] Collected: 07/14/24 1006    Order Status: Completed Specimen: Blood Updated: 07/14/24 2145     Blood Culture, Routine No Growth to date    Narrative:      From 2 different sites 30 minutes apart  Rt AC    Blood culture [[id number]] Collected: 07/14/24 1006    Order Status: Completed Specimen: Blood Updated: 07/14/24 2145     Blood Culture, Routine No Growth to date    Narrative:      From 2 different sites 30 minutes apart  Rt hand    Urine culture [[id number]] Collected: 07/12/24 1933    Order Status: Completed Specimen: Urine Updated: 07/14/24 0545      Urine Culture, Routine No significant growth    Narrative:      Specimen Source->Urine    Blood culture #1 **CANNOT BE ORDERED STAT** [[id number]] Collected: 07/12/24 1517    Order Status: Sent Specimen: Blood from Peripheral, Forearm, Right     Blood culture #2 **CANNOT BE ORDERED STAT** [[id number]] Collected: 07/12/24 1516    Order Status: Sent Specimen: Blood from Peripheral, Antecubital, Right             Significant Imaging: None

## 2024-07-15 NOTE — PLAN OF CARE
Problem: Skin Injury Risk Increased  Goal: Skin Health and Integrity  Outcome: Progressing     Problem: Fall Injury Risk  Goal: Absence of Fall and Fall-Related Injury  Outcome: Progressing   Pt remain on 2L O2. Pt up with assistance. Iv abx given. No significant changes noted. MD notified. Bed locked and in lowest position. Call light in reach.

## 2024-07-15 NOTE — PROGRESS NOTES
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   Reports cat scratch to left wrist approximately two days prior to onset of symptoms.    Xray of LUE showed soft tissue swelling, no fracture, no foreign bodies, no effusion.   Venous US was negative for DVT.  Blood cultures of 7/11 now positive for Pasturella multocida.  Repeat cultures from 7/12 and 7/14 are negative.     Afebrile. HDS. Continues on unasyn alone.     Plan/recommendations:   Continue IV unasyn for now.  Dose adjusted to q 8 hours for renal function (crcl now 30).     Continued judicious LUE elevation - hand above elbow above shoulder.   Awaiting sensitivities from micro, will follow up tomorrow   Plan reviewed with ID staff. ID will follow.         Thank you for your consult. I will follow-up with patient. Please contact us if you have any additional questions.    Keyur Crystal NP  Infectious Disease  [facility] ([place] [address])    Subjective:     Principal Problem:Cellulitis    HPI: Ms. Misa Barajas is an 78 year old with HTN, HFpEF, COPD on Home O2, pAF s/p ablation on 6/17/24, remote history of breast CA who presented to ED on 7/11 with left arem pain and swelling with rash from elbow to 5th finger. She was diagnosed with erysipelas, given cefadroxil, and discharged.  Called back to ED when a blood culture turned + for GNR.  LUE swelling reportedly started 7-9 days ago.  Developed rash, leaking clear fluid.  She did note she had a cat  scratch on wrist a couple of days ago.  An Xray of SHERLY showed soft tissue swelling, no fracture, no foreign bodies, no effusion.   A venous US was negative.  Blood cultures of 7/11 with one bottle with GNR.  ID pending.  Rapid PCR negative.  Repeats of 7/12 are pending.  She is afebrile, WBC 15.  Currently on doxy and zosyn.  ID is consulted for GNR bacteremia  Interval History:   Blood cultures + for Pasturella mulocida - beta-lactamase negative.   Repeat blood cultures of 7/12, repeats negative.   Swelling, redness improved.     Review of Systems   Constitutional:  Negative for activity change, appetite change, chills, diaphoresis, fatigue and fever.   Cardiovascular:  Positive for leg swelling.   Musculoskeletal:  Positive for arthralgias. Negative for joint swelling.   Skin:  Positive for rash and wound.   Hematological:  Bruises/bleeds easily.   All other systems reviewed and are negative.    Objective:     Vital Signs (Most Recent):  Temp: 98.1 °F (36.7 °C) (07/15/24 1238)  Pulse: 83 (07/15/24 1238)  Resp: 18 (07/15/24 1238)  BP: (!) 126/57 (07/15/24 1238)  SpO2: 96 % (07/15/24 1238) Vital Signs (24h Range):  Temp:  [97.9 °F (36.6 °C)-98.1 °F (36.7 °C)] 98.1 °F (36.7 °C)  Pulse:  [45-97] 83  Resp:  [17-18] 18  SpO2:  [96 %-100 %] 96 %  BP: (105-144)/(43-73) 126/57     Weight: 75.8 kg (167 lb)  Body mass index is 29.58 kg/m².    Estimated Creatinine Clearance: 31.3 mL/min (based on SCr of 1.4 mg/dL).     Physical Exam  Vitals and nursing note reviewed.   Constitutional:       General: She is not in acute distress.     Appearance: Normal appearance. She is not ill-appearing, toxic-appearing or diaphoretic.   HENT:      Head: Normocephalic and atraumatic.      Mouth/Throat:      Mouth: Mucous membranes are moist.   Eyes:      General: No scleral icterus.     Conjunctiva/sclera: Conjunctivae normal.   Cardiovascular:      Rate and Rhythm: Normal rate and regular rhythm.   Pulmonary:      Effort: Pulmonary effort  is normal. No respiratory distress.   Abdominal:      General: There is no distension.      Palpations: Abdomen is soft.      Tenderness: There is no abdominal tenderness.   Musculoskeletal:      Cervical back: Normal range of motion.      Right lower leg: Edema present.      Left lower leg: Edema present.   Skin:     General: Skin is warm and dry.      Findings: Erythema and rash present.      Comments: Significant decrease in erythema and swelling of left arm/hand.  Mild warmth.  Less TTP     Neurological:      General: No focal deficit present.      Mental Status: She is alert and oriented to person, place, and time.   Psychiatric:         Mood and Affect: Mood normal.         Behavior: Behavior normal.                      Significant Labs: Blood Culture:   Recent Labs   Lab 05/09/24  1433 05/09/24  1434 07/11/24  1000 07/11/24  1046 07/14/24  1006   LABBLOO No growth after 5 days. No growth after 5 days. Gram stain rossi bottle: Gram negative rods  Results called to and read back by:Gaviota Viera RN 07/12/2024 09:14  PASTEURELLA MULTOCIDA  Beta Lactamase negative  * No Growth to date  No Growth to date  No Growth to date  No Growth to date  No Growth to date No Growth to date  No Growth to date     CBC:   Recent Labs   Lab 07/14/24  0400 07/15/24  0547   WBC 6.92 6.34   HGB 10.6* 10.7*   HCT 33.9* 34.5*    194     CMP:   Recent Labs   Lab 07/14/24  0400 07/15/24  0547    144   K 3.4* 3.9    109   CO2 25 26   * 81   BUN 23 23   CREATININE 1.6* 1.4   CALCIUM 9.6 9.3   PROT 5.4* 5.3*   ALBUMIN 2.0* 2.0*   BILITOT 0.9 0.8   ALKPHOS 92 90   AST 25 26   ALT 16 18   ANIONGAP 11 9     Microbiology Results (last 7 days)       Procedure Component Value Units Date/Time    Blood culture [[id number]] Collected: 07/14/24 1006    Order Status: Completed Specimen: Blood Updated: 07/14/24 2145     Blood Culture, Routine No Growth to date    Narrative:      From 2 different sites 30 minutes  apart  Rt AC    Blood culture [[id number]] Collected: 07/14/24 1006    Order Status: Completed Specimen: Blood Updated: 07/14/24 2145     Blood Culture, Routine No Growth to date    Narrative:      From 2 different sites 30 minutes apart  Rt hand    Urine culture [[id number]] Collected: 07/12/24 1933    Order Status: Completed Specimen: Urine Updated: 07/14/24 0545     Urine Culture, Routine No significant growth    Narrative:      Specimen Source->Urine    Blood culture #1 **CANNOT BE ORDERED STAT** [[id number]] Collected: 07/12/24 1517    Order Status: Sent Specimen: Blood from Peripheral, Forearm, Right     Blood culture #2 **CANNOT BE ORDERED STAT** [[id number]] Collected: 07/12/24 1516    Order Status: Sent Specimen: Blood from Peripheral, Antecubital, Right             Significant Imaging: None

## 2024-07-16 ENCOUNTER — TELEPHONE (OUTPATIENT)
Dept: INFECTIOUS DISEASES | Facility: CLINIC | Age: 79
End: 2024-07-16
Payer: MEDICARE

## 2024-07-16 LAB
ALBUMIN SERPL BCP-MCNC: 2 G/DL (ref 3.5–5.2)
ALP SERPL-CCNC: 82 U/L (ref 55–135)
ALT SERPL W/O P-5'-P-CCNC: 17 U/L (ref 10–44)
ANION GAP SERPL CALC-SCNC: 7 MMOL/L (ref 8–16)
AST SERPL-CCNC: 26 U/L (ref 10–40)
BACTERIA BLD CULT: NORMAL
BASOPHILS # BLD AUTO: 0.07 K/UL (ref 0–0.2)
BASOPHILS NFR BLD: 1.2 % (ref 0–1.9)
BILIRUB SERPL-MCNC: 0.6 MG/DL (ref 0.1–1)
BUN SERPL-MCNC: 22 MG/DL (ref 8–23)
CALCIUM SERPL-MCNC: 9.2 MG/DL (ref 8.7–10.5)
CHLORIDE SERPL-SCNC: 108 MMOL/L (ref 95–110)
CO2 SERPL-SCNC: 28 MMOL/L (ref 23–29)
CREAT SERPL-MCNC: 1.3 MG/DL (ref 0.5–1.4)
DIFFERENTIAL METHOD BLD: ABNORMAL
EOSINOPHIL # BLD AUTO: 0.3 K/UL (ref 0–0.5)
EOSINOPHIL NFR BLD: 5.2 % (ref 0–8)
ERYTHROCYTE [DISTWIDTH] IN BLOOD BY AUTOMATED COUNT: 13.6 % (ref 11.5–14.5)
EST. GFR  (NO RACE VARIABLE): 41.6 ML/MIN/1.73 M^2
GLUCOSE SERPL-MCNC: 103 MG/DL (ref 70–110)
HCT VFR BLD AUTO: 33.8 % (ref 37–48.5)
HGB BLD-MCNC: 10.5 G/DL (ref 12–16)
IMM GRANULOCYTES # BLD AUTO: 0.03 K/UL (ref 0–0.04)
IMM GRANULOCYTES NFR BLD AUTO: 0.5 % (ref 0–0.5)
LYMPHOCYTES # BLD AUTO: 1.5 K/UL (ref 1–4.8)
LYMPHOCYTES NFR BLD: 26.6 % (ref 18–48)
MAGNESIUM SERPL-MCNC: 1.8 MG/DL (ref 1.6–2.6)
MCH RBC QN AUTO: 28.8 PG (ref 27–31)
MCHC RBC AUTO-ENTMCNC: 31.1 G/DL (ref 32–36)
MCV RBC AUTO: 93 FL (ref 82–98)
MONOCYTES # BLD AUTO: 0.8 K/UL (ref 0.3–1)
MONOCYTES NFR BLD: 13.8 % (ref 4–15)
NEUTROPHILS # BLD AUTO: 3.1 K/UL (ref 1.8–7.7)
NEUTROPHILS NFR BLD: 52.7 % (ref 38–73)
NRBC BLD-RTO: 0 /100 WBC
PHOSPHATE SERPL-MCNC: 2.7 MG/DL (ref 2.7–4.5)
PLATELET # BLD AUTO: 193 K/UL (ref 150–450)
PMV BLD AUTO: 9.8 FL (ref 9.2–12.9)
POTASSIUM SERPL-SCNC: 3.4 MMOL/L (ref 3.5–5.1)
PROT SERPL-MCNC: 5.1 G/DL (ref 6–8.4)
RBC # BLD AUTO: 3.64 M/UL (ref 4–5.4)
SODIUM SERPL-SCNC: 143 MMOL/L (ref 136–145)
WBC # BLD AUTO: 5.8 K/UL (ref 3.9–12.7)

## 2024-07-16 PROCEDURE — 63600175 PHARM REV CODE 636 W HCPCS: Performed by: HOSPITALIST

## 2024-07-16 PROCEDURE — 36415 COLL VENOUS BLD VENIPUNCTURE: CPT | Performed by: HOSPITALIST

## 2024-07-16 PROCEDURE — 99233 SBSQ HOSP IP/OBS HIGH 50: CPT | Mod: ,,, | Performed by: REGISTERED NURSE

## 2024-07-16 PROCEDURE — 84100 ASSAY OF PHOSPHORUS: CPT | Performed by: HOSPITALIST

## 2024-07-16 PROCEDURE — 85025 COMPLETE CBC W/AUTO DIFF WBC: CPT | Performed by: HOSPITALIST

## 2024-07-16 PROCEDURE — 21400001 HC TELEMETRY ROOM

## 2024-07-16 PROCEDURE — 83735 ASSAY OF MAGNESIUM: CPT | Performed by: HOSPITALIST

## 2024-07-16 PROCEDURE — 80053 COMPREHEN METABOLIC PANEL: CPT | Performed by: HOSPITALIST

## 2024-07-16 PROCEDURE — 25000003 PHARM REV CODE 250: Performed by: HOSPITALIST

## 2024-07-16 RX ORDER — FUROSEMIDE 40 MG/1
40 TABLET ORAL DAILY
Qty: 30 TABLET | Refills: 11 | Status: ON HOLD | OUTPATIENT
Start: 2024-07-16 | End: 2025-07-16

## 2024-07-16 RX ADMIN — FUROSEMIDE 40 MG: 40 TABLET ORAL at 09:07

## 2024-07-16 RX ADMIN — APIXABAN 2.5 MG: 2.5 TABLET, FILM COATED ORAL at 08:07

## 2024-07-16 RX ADMIN — AMPICILLIN SODIUM AND SULBACTAM SODIUM 3 G: 2; 1 INJECTION, POWDER, FOR SOLUTION INTRAMUSCULAR; INTRAVENOUS at 09:07

## 2024-07-16 RX ADMIN — ATORVASTATIN CALCIUM 20 MG: 20 TABLET, FILM COATED ORAL at 09:07

## 2024-07-16 RX ADMIN — CHOLECALCIFEROL TAB 25 MCG (1000 UNIT) 2000 UNITS: 25 TAB at 09:07

## 2024-07-16 RX ADMIN — AMPICILLIN SODIUM AND SULBACTAM SODIUM 3 G: 2; 1 INJECTION, POWDER, FOR SOLUTION INTRAMUSCULAR; INTRAVENOUS at 05:07

## 2024-07-16 RX ADMIN — METOPROLOL SUCCINATE 100 MG: 50 TABLET, EXTENDED RELEASE ORAL at 09:07

## 2024-07-16 RX ADMIN — APIXABAN 2.5 MG: 2.5 TABLET, FILM COATED ORAL at 09:07

## 2024-07-16 RX ADMIN — ASPIRIN 81 MG: 81 TABLET, COATED ORAL at 09:07

## 2024-07-16 RX ADMIN — AMPICILLIN SODIUM AND SULBACTAM SODIUM 3 G: 2; 1 INJECTION, POWDER, FOR SOLUTION INTRAMUSCULAR; INTRAVENOUS at 02:07

## 2024-07-16 NOTE — SUBJECTIVE & OBJECTIVE
Interval History:   Blood cultures + for Pasturella mulocida - beta-lactamase negative.   Repeat blood cultures of 7/12, repeats negative.   Swelling, redness improved.     Review of Systems   Constitutional:  Negative for activity change, appetite change, chills, diaphoresis, fatigue and fever.   Cardiovascular:  Positive for leg swelling.   Musculoskeletal:  Positive for arthralgias. Negative for joint swelling.   Skin:  Positive for rash and wound.   Hematological:  Bruises/bleeds easily.   All other systems reviewed and are negative.    Objective:     Vital Signs (Most Recent):  Temp: 97.3 °F (36.3 °C) (07/16/24 1116)  Pulse: 69 (07/16/24 1116)  Resp: 18 (07/16/24 1116)  BP: (!) 124/58 (07/16/24 1116)  SpO2: 97 % (07/16/24 1116) Vital Signs (24h Range):  Temp:  [97.3 °F (36.3 °C)-98.7 °F (37.1 °C)] 97.3 °F (36.3 °C)  Pulse:  [62-99] 69  Resp:  [18] 18  SpO2:  [96 %-98 %] 97 %  BP: (120-144)/(52-70) 124/58     Weight: 75.8 kg (167 lb)  Body mass index is 29.58 kg/m².    Estimated Creatinine Clearance: 33.7 mL/min (based on SCr of 1.3 mg/dL).     Physical Exam  Vitals and nursing note reviewed.   Constitutional:       General: She is not in acute distress.     Appearance: Normal appearance. She is not ill-appearing, toxic-appearing or diaphoretic.   HENT:      Head: Normocephalic and atraumatic.      Mouth/Throat:      Mouth: Mucous membranes are moist.   Eyes:      General: No scleral icterus.     Conjunctiva/sclera: Conjunctivae normal.   Cardiovascular:      Rate and Rhythm: Normal rate and regular rhythm.   Pulmonary:      Effort: Pulmonary effort is normal. No respiratory distress.   Abdominal:      General: There is no distension.      Palpations: Abdomen is soft.      Tenderness: There is no abdominal tenderness.   Musculoskeletal:      Cervical back: Normal range of motion.      Right lower leg: Edema present.      Left lower leg: Edema present.   Skin:     General: Skin is warm and dry.      Findings:  Erythema and rash present.      Comments: Significant decrease in erythema and swelling of left arm/hand.  Mild warmth.  Less TTP     Neurological:      General: No focal deficit present.      Mental Status: She is alert and oriented to person, place, and time.   Psychiatric:         Mood and Affect: Mood normal.         Behavior: Behavior normal.                      Significant Labs: Blood Culture:   Recent Labs   Lab 05/09/24  1433 05/09/24  1434 07/11/24  1000 07/11/24  1046 07/14/24  1006   LABBLOO No growth after 5 days. No growth after 5 days. Gram stain rossi bottle: Gram negative rods  Results called to and read back by:Gaviota Viera RN 07/12/2024 09:14  PASTEURELLA MULTOCIDA  Beta Lactamase negative  * No growth after 5 days. No Growth to date  No Growth to date  No Growth to date  No Growth to date     CBC:   Recent Labs   Lab 07/15/24  0547 07/16/24  0929   WBC 6.34 5.80   HGB 10.7* 10.5*   HCT 34.5* 33.8*    193     CMP:   Recent Labs   Lab 07/15/24  0547 07/16/24  0929    143   K 3.9 3.4*    108   CO2 26 28   GLU 81 103   BUN 23 22   CREATININE 1.4 1.3   CALCIUM 9.3 9.2   PROT 5.3* 5.1*   ALBUMIN 2.0* 2.0*   BILITOT 0.8 0.6   ALKPHOS 90 82   AST 26 26   ALT 18 17   ANIONGAP 9 7*     Microbiology Results (last 7 days)       Procedure Component Value Units Date/Time    Blood culture [[id number]] Collected: 07/14/24 1006    Order Status: Completed Specimen: Blood Updated: 07/15/24 1612     Blood Culture, Routine No Growth to date      No Growth to date    Narrative:      From 2 different sites 30 minutes apart  Rt AC    Blood culture [[id number]] Collected: 07/14/24 1006    Order Status: Completed Specimen: Blood Updated: 07/15/24 1612     Blood Culture, Routine No Growth to date      No Growth to date    Narrative:      From 2 different sites 30 minutes apart  Rt hand    Urine culture [[id number]] Collected: 07/12/24 1933    Order Status: Completed Specimen: Urine Updated:  07/14/24 0545     Urine Culture, Routine No significant growth    Narrative:      Specimen Source->Urine    Blood culture #1 **CANNOT BE ORDERED STAT** [[id number]] Collected: 07/12/24 1517    Order Status: Sent Specimen: Blood from Peripheral, Forearm, Right     Blood culture #2 **CANNOT BE ORDERED STAT** [[id number]] Collected: 07/12/24 1516    Order Status: Sent Specimen: Blood from Peripheral, Antecubital, Right             Significant Imaging: None

## 2024-07-16 NOTE — PLAN OF CARE
[facility] Med [facility] ([facility])  Discharge Reassessment    Per MD team, patient is not medically ready for d/c at this time. No post acute needs. Plan is to d/c to home w/her daughter, Marina, when ready. Will continue to follow for needs.    Primary Care Provider: Celia Carlisle MD    Expected Discharge Date: 7/17/2024    Reassessment (most recent)       Discharge Reassessment - 07/16/24 1545          Discharge Reassessment    Assessment Type Discharge Planning Reassessment (P)      Did the patient's condition or plan change since previous assessment? No (P)      Discharge Plan discussed with: Patient;Adult children (P)      Communicated NOHEMI with patient/caregiver Yes (P)      Discharge Plan A Home with family (P)      Discharge Plan B Home;Home with family (P)      DME Needed Upon Discharge  other (see comments) (P)    TBD    Transition of Care Barriers None (P)      Why the patient remains in the hospital Requires continued medical care (P)         Post-Acute Status    Post-Acute Authorization Other (P)      Coverage [employer] - [employer]V - (P)      Other Status No Post-Acute Service Needs (P)      Discharge Delays None known at this time (P)                    Discharge Plan A and Plan B have been determined by review of patient's clinical status, future medical and therapeutic needs, and coverage/benefits for post-acute care in coordination with multidisciplinary team members.    RC Corrigan, LMSW    Case Management Department  [facility]

## 2024-07-16 NOTE — DISCHARGE SUMMARY
[facility] - Med Surg ([place] [id number])  [facility] Medicine  Discharge Summary      Patient Name: Misa Barajas  MRN: [id number]  ARTHUR: [id number]  Patient Class: IP- Inpatient  Admission Date: 7/12/2024  Hospital Length of Stay: 6 days  Discharge Date and Time:  07/18/2024 8:18 AM  Attending Physician: Jarad Dueñas MD   Discharging Provider: Jarad Dueñas MD  Primary Care Provider: Celia Carlisle MD  Hospital Medicine Team: [facility] MED Q Jarad Dueñas MD  Primary Care Team: [facility]OS[facility] MED Q    HPI:   Misa Barajas is a 78 y.o. female with a PMHx of HTN, HLD, HFpEF, COPD on home O2 (2L NC baseline O2 sat 92%) and pAF presents with  left sided painful erythematous rash which extends from her medial elbow to her fifth digit.     AAOX3. accompanied by daughters at the bedside. patient was seen at the ER on 7/12/24 and diagnosed with Erysipelas and discharged on cefadroxil outpatient. She e was called back after GNR's were seen on her 1/2 blood cultuers.  c/o  small bout of diarrhea . denies new symptoms. reports LUE  swelling started 7-9 days ago when she noticed her arm was swelling and leaking clear fluid - associated with elbow was red and tender.  reports that her cat scratched her near left wrist a couple of days ago. c/o  subjective fever.       ER course - X ray left elbow -diffuse soft tissue swelling identified. Visualized osseous structures appear unremarkable, with no evidence of recent or healing fracture, lytic destructive process, or appreciable arthritic change noted. No elbow joint effusion is demonstrated. No radiopaque soft tissue foreign body or abnormal gas collection within the soft tissues is appreciated. US LUE -No thrombus in central veins of the left upper extremity.  Repeat BCX 2 . started on IV Zosyn     last admission 6/27 - had successful RFA of the CTI for treatment of CTI-dependent atrial flutter. No evidence of intra- or post-procedure complications. Post-ablation ECG  shows NSR, and no acute abnormalities.       * No surgery found *      Hospital Course:   7/13 K replaced. Nutrition consulted for low albumin. LE stockings and elevate LUE. lasix 40mg BID PRN. switched to unasyn   7/14 BC 7/11 with Pasteurella multocida . BCX 2 7/12 lost. repeat BCX 2 today . Cr 1.2-->1.6. hold lasix for now  7/15 LE erythema and swelling improved. Nutritin evaluation . continue boost . Cr 1.4. continue PO lasix   7/16 Awaiting sensitivities from BC.   7/17 BCx2 7/14. NGTD.  Unasyn discontinued. ID recs ceftriaxone 2g IV q24 while awaiting micro sensitivities.anticipate 14d from cleared culture( end date 7/26/24). ID to follow up cultures.     7/18 discharge with home health      Goals of Care Treatment Preferences:  Code Status: Full Code          Assessment/Plan:      * Cellulitis  presents with  left sided painful erythematous rash which extends from her medial elbow to her fifth digit.  was seen at the ER on 7/12/24 and diagnosed with Erysipelas and discharged on antibiotics outpatient. She e was called back after GNR's were seen on her 1/2 blood cultuers     ER course - X ray left elbow -diffuse soft tissue swelling identified. Visualized osseous structures appear unremarkable, with no evidence of recent or healing fracture, lytic destructive process, or appreciable arthritic change noted. No elbow joint effusion is demonstrated. No radiopaque soft tissue foreign body or abnormal gas collection within the soft tissues is appreciated. US LUE -No thrombus in central veins of the left upper extremity.  Repeat BCX 2 . started on IV Zosyn      ID evaluation . switched to unasyn. continue doxycycline      Leucocytosis  resolved         Recent Labs   Lab 07/13/24  1118 07/14/24  0400 07/15/24  0547   WBC 10.25 6.92 6.34     . Afebrile. BCX 2 likely secondary to sepsis..        Anemia     Patient's with Normocytic anemia.. Hemoglobin stable. Etiology likely due to chronic disease .  Current CBC  reviewed-        Recent Labs   Lab 07/11/24  1000   HGB 11.6*                Component Value Date/Time     MCV 93 07/11/2024 1000     RDW 13.8 07/11/2024 1000     IRON 115 06/23/2016 0947     FERRITIN 98 06/23/2016 0947      Monitor CBC and transfuse if H/H drops below 7/21.       Gram-negative bacteremia - Pasturella multocida  GNR's were seen on her 1/2 blood cultures on 7/11. repeat BC x2   7/14 BC 7/11 with Pasteurella multocida . BCX 2 7/12 lost. repeat BCX 2 today .  7/17 BCx2 7/14. NGTD.  Unasyn discontinued. ID recs ceftriaxone 2g IV q24 while awaiting micro sensitivities.anticipate 14d from cleared culture( end date 7/26/24). ID to follow up cultures.   discharge with home health      Diastolic CHF, chronic  Patient is identified as having Diastolic (HFpEF) heart failure that is Chronic. CHF is currently controlled. Latest ECHO performed and demonstrates- Results for orders placed during the hospital encounter of 05/07/24     Echo     Interpretation Summary    Left Ventricle: The left ventricle is normal in size. Ventricular mass is normal. Normal wall thickness. Normal wall motion. There is normal systolic function. Ejection fraction by visual approximation is 55%. Unable to assess diastolic function due to atrial fibrillation.    Right Ventricle: Normal right ventricular cavity size. Wall thickness is normal. Systolic function is mildly reduced.    Aortic Valve: The aortic valve is a trileaflet valve. There is moderate aortic valve sclerosis. There is annular calcification present. Mildly restricted motion. No stenosis.    Tricuspid Valve: There is mild to moderate regurgitation.    Pulmonary Artery: The estimated pulmonary artery systolic pressure is 44 mmHg.    IVC/SVC: Elevated venous pressure at 15 mmHg.     Hypokalemia  Patient has hypokalemia which is Acute and currently stable. Most recent potassium levels reviewed-         Lab Results   Component Value Date     K 3.9 07/15/2024   . Will continue  potassium replacement per protocol and recheck repeat levels after replacement completed.      Chronic anticoagulation  continue eliquis         Chronic respiratory failure with hypoxia  HFpEF, COPD on home O2  on 2L NC baseline      Hyperlipidemia  continue statin         Paroxysmal atrial fibrillation  continue metoprolol and eliquis. last admission 6/27 - had successful RFA of the CTI for treatment of CTI-dependent atrial flutter. No evidence of intra- or post-procedure complications. Post-ablation ECG shows NSR, and no acute abnormalities.      CKD (chronic kidney disease) stage 3, GFR 30-59 ml/min  Creatine stable for now. BMP reviewed- noted Estimated Creatinine Clearance: 31.3 mL/min (based on SCr of 1.4 mg/dL). according to latest data. Monitor UOP and serial BMP and adjust therapy as needed. Renally dose meds.           Recent Labs   Lab 07/13/24  0519 07/14/24  0400 07/15/24  0547   BUN 24* 23 23   CREATININE 1.3 1.6* 1.4         I & O      Intake/Output Summary (Last 24 hours) at 7/15/2024 0744  Last data filed at 7/15/2024 0620      Gross per 24 hour   Intake 660 ml   Output --   Net 660 ml    7/14 Cr 1.2-->1.6. hold lasix for now     Essential hypertension  Chronic, controlled. Latest blood pressure and vitals reviewed-      Temp:  [97.8 °F (36.6 °C)-98 °F (36.7 °C)]   Pulse:  [65-68]   Resp:  [18-20]   BP: (107-109)/(52)   SpO2:  [95 %-99 %] .   Home meds for hypertension were reviewed and noted below.   Hypertension Medications                    furosemide (LASIX) 40 MG tablet Take 1 tablet (40 mg total) by mouth 2 (two) times daily.     metoprolol succinate (TOPROL-XL) 100 MG 24 hr tablet Take 1 tablet (100 mg total) by mouth once daily.                While in the hospital, will manage blood pressure as follows; Continue home antihypertensive regimen     Will utilize p.r.n. blood pressure medication only if patient's blood pressure greater than 180/110 and she develops symptoms such as worsening  "chest pain or shortness of breath.        Final Active Diagnoses:    Diagnosis Date Noted POA    PRINCIPAL PROBLEM:  Cellulitis [L03.90] 07/12/2024 Yes    Gram-negative bacteremia - Pasturella multocida [R78.81] 07/12/2024 No    Anemia [D64.9] 07/12/2024 Unknown    Leucocytosis [D72.829] 07/12/2024 No    Diastolic CHF, chronic [I50.32] 05/07/2024 Yes    Hypokalemia [E87.6] 03/02/2024 Yes    Chronic anticoagulation [Z79.01] 05/04/2022 Not Applicable    Chronic respiratory failure with hypoxia [J96.11] 06/26/2021 Yes    Paroxysmal atrial fibrillation [I48.0] 06/07/2021 Yes    CKD (chronic kidney disease) stage 3, GFR 30-59 ml/min [N18.30] 02/14/2018 Yes    Hyperlipidemia [E78.5] 11/20/2016 Yes    Essential hypertension [I10] 05/28/2015 Yes      Problems Resolved During this Admission:       Discharged Condition: fair    Disposition: home    Follow Up:    Patient Instructions:   No discharge procedures on file.    Significant Diagnostic Studies: Labs: BMP:   Recent Labs   Lab 07/16/24  0929 07/17/24  0338    74    142   K 3.4* 4.0    109   CO2 28 24   BUN 22 21   CREATININE 1.3 1.4   CALCIUM 9.2 9.2   MG 1.8 1.8   , CMP   Recent Labs   Lab 07/16/24  0929 07/17/24  0338    142   K 3.4* 4.0    109   CO2 28 24    74   BUN 22 21   CREATININE 1.3 1.4   CALCIUM 9.2 9.2   PROT 5.1* 5.2*   ALBUMIN 2.0* 1.9*   BILITOT 0.6 0.5   ALKPHOS 82 86   AST 26 38   ALT 17 18   ANIONGAP 7* 9   , CBC   Recent Labs   Lab 07/16/24  0929 07/17/24  0338 07/18/24  0508   WBC 5.80 6.74 7.12   HGB 10.5* 10.2* 10.3*   HCT 33.8* 32.8* 34.2*    191 199   , INR   Lab Results   Component Value Date    INR 1.1 06/12/2024    INR 0.9 08/09/2010   , Lipid Panel   Lab Results   Component Value Date    CHOL 148 08/21/2023    HDL 66 08/21/2023    LDLCALC 67.0 08/21/2023    TRIG 75 08/21/2023    CHOLHDL 44.6 08/21/2023   , Troponin No results for input(s): "TROPONINI" in the last 168 hours., A1C:   Recent Labs " "  Lab 03/01/24  1614 05/08/24  0514   HGBA1C 5.4 5.3   , and All labs within the past 24 hours have been reviewed  Microbiology: Blood Culture   Lab Results   Component Value Date    LABBLOO No Growth to date 07/14/2024    LABBLOO No Growth to date 07/14/2024    LABBLOO No Growth to date 07/14/2024    LABBLOO No Growth to date 07/14/2024    LABBLOO No Growth to date 07/14/2024    LABBLOO No Growth to date 07/14/2024    LABBLOO No Growth to date 07/14/2024    LABBLOO No Growth to date 07/14/2024   , Sputum Culture No results found for: "GSRESP", "RESPIRATORYC", and Urine Culture    Lab Results   Component Value Date    LABURIN No significant growth 07/12/2024       X-Ray Chest 1 View  Narrative: EXAMINATION:  XR CHEST 1 VIEW    CLINICAL HISTORY:  r/o pneumonia;    TECHNIQUE:  Single frontal view of the chest was performed.    COMPARISON:  Chest radiograph 05/07/2024    FINDINGS:  Lines and tubes: Monitoring leads.    Heart and mediastinum: Magnified by AP technique.  Calcifications of the aortic arch.    Pleura: Small pleural effusions bilaterally, left greater than right.  No pneumothorax.    Lungs: Lung volumes are adequate.  No pulmonary edema.  Consolidative opacities in the lung bases bilaterally, likely atelectasis in the setting of effusions although pneumonia could have a similar appearance.    Soft tissue/bone: Surgical clips in the left axilla.  Osteopenia and degenerative changes.  Impression: As above.    Electronically signed by: Hero Cuevas  Date:    07/16/2024  Time:    13:43       Pending Diagnostic Studies:       Procedure Component Value Units Date/Time    Comprehensive Metabolic Panel [[id number]] Collected: 07/18/24 0508    Order Status: Sent Lab Status: In process Updated: 07/18/24 0550    Specimen: Blood     Magnesium [[id number]] Collected: 07/18/24 0508    Order Status: Sent Lab Status: In process Updated: 07/18/24 0550    Specimen: Blood     Phosphorus [[id number]] Collected: 07/18/24 0508    " Order Status: Sent Lab Status: In process Updated: 07/18/24 0550    Specimen: Blood            Medications:  Reconciled Home Medications:      Medication List        START taking these medications      D5W PgBk 100 mL with cefTRIAXone 2 gram SolR 2 g  Inject 2 g into the vein once daily. for 9 days            CHANGE how you take these medications      furosemide 40 MG tablet  Commonly known as: LASIX  Take 1 tablet (40 mg total) by mouth once daily.  What changed: when to take this            CONTINUE taking these medications      acetaminophen 500 MG tablet  Commonly known as: TYLENOL  Take 2 tablets (1,000 mg total) by mouth every 8 (eight) hours as needed for Pain.     albuterol 90 mcg/actuation inhaler  Commonly known as: PROVENTIL HFA  Inhale 2 puffs into the lungs every 6 (six) hours as needed for Wheezing. Rescue     aspirin 81 MG EC tablet  Commonly known as: ECOTRIN  Take 81 mg by mouth once daily.     biotin 1 mg tablet  Take 1,000 mcg by mouth once daily.     BREZTRI AEROSPHERE 160-9-4.8 mcg/actuation Hfaa  Generic drug: budesonide-glycopyr-formoterol  Inhale 2 puffs into the lungs 2 (two) times daily.     busPIRone 5 MG Tab  Commonly known as: BUSPAR  Take 5 mg by mouth as needed.     ELIQUIS 2.5 mg Tab  Generic drug: apixaban  Take 1 tablet (2.5 mg total) by mouth 2 (two) times a day.     fluticasone propionate 50 mcg/actuation nasal spray  Commonly known as: FLONASE  1 spray by Each Nostril route daily as needed (congestion).     inhalation spacing device  Use as directed for inhalation.     metoprolol succinate 100 MG 24 hr tablet  Commonly known as: TOPROL-XL  Take 1 tablet (100 mg total) by mouth once daily.     vitamin D 1000 units Tab  Commonly known as: VITAMIN D3  Take 2 tablets (2,000 Units total) by mouth once daily.            STOP taking these medications      cefadroxil 500 MG Cap  Commonly known as: DURICEF     rosuvastatin 5 MG tablet  Commonly known as: CRESTOR     tamsulosin 0.4 mg  Cap  Commonly known as: FLOMAX              Indwelling Lines/Drains at time of discharge:   Lines/Drains/Airways       None                 40minutes of time spent on discharge         Jarad Dueñas MD  Attending Staff Physician  [facility] Medicine  pager- [phone]  [facility] - [phone]

## 2024-07-16 NOTE — PLAN OF CARE
Problem: Acute Kidney Injury/Impairment  Goal: Improved Oral Intake  Outcome: Progressing     Problem: Skin Injury Risk Increased  Goal: Skin Health and Integrity  Outcome: Progressing  No significant changes noted.  Pt remain on 2L O2. Pt up with assistance. Iv abx given.  Bed locked and in lowest position. Call light in reach.

## 2024-07-16 NOTE — PROGRESS NOTES
[facility] ([address] Medicine  Progress Note    Patient Name: Misa Barajas  MRN: [id number]  Patient Class: IP- Inpatient   Admission Date: 7/12/2024  Length of Stay: 4 days  Attending Physician: Jarad Dueñas MD  Primary Care Provider: Celia Carlisle MD        Subjective:     Principal Problem:Cellulitis        HPI:  Misa Barajas is a 78 y.o. female with a PMHx of HTN, HLD, HFpEF, COPD on home O2 (2L NC baseline O2 sat 92%) and pAF presents with  left sided painful erythematous rash which extends from her medial elbow to her fifth digit.     AAOX3. accompanied by daughters at the bedside. patient was seen at the ER on 7/12/24 and diagnosed with Erysipelas and discharged on cefadroxil outpatient. She e was called back after GNR's were seen on her 1/2 blood cultuers.  c/o  small bout of diarrhea . denies new symptoms. reports LUE  swelling started 7-9 days ago when she noticed her arm was swelling and leaking clear fluid - associated with elbow was red and tender.  reports that her cat scratched her near left wrist a couple of days ago. c/o  subjective fever.       ER course - X ray left elbow -diffuse soft tissue swelling identified. Visualized osseous structures appear unremarkable, with no evidence of recent or healing fracture, lytic destructive process, or appreciable arthritic change noted. No elbow joint effusion is demonstrated. No radiopaque soft tissue foreign body or abnormal gas collection within the soft tissues is appreciated. US LUE -No thrombus in central veins of the left upper extremity.  Repeat BCX 2 . started on IV Zosyn     last admission 6/27 - had successful RFA of the CTI for treatment of CTI-dependent atrial flutter. No evidence of intra- or post-procedure complications. Post-ablation ECG shows NSR, and no acute abnormalities.       Overview/Hospital Course:  7/13 K replaced. Nutrition consulted for low albumin. LE stockings and elevate LUE. lasix  40mg BID PRN. switched to unasyn   7/14 BC 7/11 with Pasteurella multocida . BCX 2 7/12 lost. repeat BCX 2 today . Cr 1.2-->1.6. hold lasix for now  7/15 LE erythema and swelling improved. Nutritin evaluation . continue boost . Cr 1.4. continue PO lasix   7/16 Awaiting sensitivities from BC.  likely discharge home in AM        Review of Systems:   Pain scale:    Constitutional:  fever,  chills, headache, vision loss, hearing loss, weight loss, Generalized weakness, falls, loss of smell, loss of taste, poor appetite,  sore throat  Respiratory: cough, shortness of breath.   Cardiovascular: chest pain, dizziness, palpitations, orthopnea, swelling of feet, syncope  Gastrointestinal: nausea, vomiting, abdominal pain, diarrhea, black stool,  blood in stool, change in bowel habits, constipation  Genitourinary: hematuria, dysuria, urgency, frequency  Integument/Breast: rash,  pruritis, erythema - improved   Hematologic/Lymphatic: easy bruising, lymphadenopathy  Musculoskeletal: arthralgias , myalgias, back pain, neck pain, knee pain  Neurological: confusion, seizures, tremors, slurred speech  Behavioral/Psych:  depression, anxiety, auditory or visual hallucinations     OBJECTIVE:     Physical Exam:  Body mass index is 29.58 kg/m².    Constitutional: Appears well-developed and well-nourished.   Head: Normocephalic and atraumatic.   Neck: Normal range of motion. Neck supple.   Cardiovascular: Normal heart rate.  Regular heart rhythm.  Pulmonary/Chest: Effort normal.   Abdominal: No distension.  No tenderness  Musculoskeletal: Normal range of motion.  erythematous rash over left upper extremity extending from hand to mid humerus with Induration, small scratch over the volar aspect of the left forearm.   +  edema -B/L LE and LUE   Neurological: Alert and oriented to person, place, and time.   Skin: Skin is warm and dry.   Psychiatric: Normal mood and affect. Behavior is normal.       Vital Signs  Temp: 97.3 °F (36.3 °C)  "(07/16/24 1116)  Pulse: 69 (07/16/24 1116)  Resp: 18 (07/16/24 1116)  BP: (!) 124/58 (07/16/24 1116)  SpO2: 97 % (07/16/24 1116)     24 Hour VS Range    Temp:  [97.3 °F (36.3 °C)-98.7 °F (37.1 °C)]   Pulse:  [62-99]   Resp:  [18]   BP: (120-144)/(52-70)   SpO2:  [96 %-98 %]     Intake/Output Summary (Last 24 hours) at 7/16/2024 1426  Last data filed at 7/16/2024 0236  Gross per 24 hour   Intake 120 ml   Output --   Net 120 ml           I/O This Shift:  No intake/output data recorded.    Wt Readings from Last 3 Encounters:   07/12/24 75.8 kg (167 lb)   07/11/24 75.8 kg (167 lb)   07/08/24 77.1 kg (169 lb 15.6 oz)       I have personally reviewed the vitals and recorded Intake/Output     Laboratory/Diagnostic Data:    CBC/Anemia Labs: Coags:    Recent Labs   Lab 07/14/24  0400 07/15/24  0547 07/16/24  0929   WBC 6.92 6.34 5.80   HGB 10.6* 10.7* 10.5*   HCT 33.9* 34.5* 33.8*    194 193   MCV 94 94 93   RDW 13.8 13.6 13.6    No results for input(s): "PT", "INR", "APTT" in the last 168 hours.     Chemistries: ABG:   Recent Labs   Lab 07/14/24  0400 07/15/24  0547 07/16/24  0929    144 143   K 3.4* 3.9 3.4*    109 108   CO2 25 26 28   BUN 23 23 22   CREATININE 1.6* 1.4 1.3   CALCIUM 9.6 9.3 9.2   PROT 5.4* 5.3* 5.1*   BILITOT 0.9 0.8 0.6   ALKPHOS 92 90 82   ALT 16 18 17   AST 25 26 26   MG 1.9 1.8 1.8   PHOS 2.2* 3.2 2.7    No results for input(s): "PH", "PCO2", "PO2", "HCO3", "POCSATURATED", "BE" in the last 168 hours.     POCT Glucose: HbA1c:    No results for input(s): "POCTGLUCOSE" in the last 168 hours. Hemoglobin A1C   Date Value Ref Range Status   05/08/2024 5.3 4.0 - 5.6 % Final     Comment:     ADA Screening Guidelines:  5.7-6.4%  Consistent with prediabetes  >or=6.5%  Consistent with diabetes    High levels of fetal hemoglobin interfere with the HbA1C  assay. Heterozygous hemoglobin variants (HbS, HgC, etc)do  not significantly interfere with this assay.   However, presence of multiple " "variants may affect accuracy.     03/01/2024 5.4 4.0 - 5.6 % Final     Comment:     ADA Screening Guidelines:  5.7-6.4%  Consistent with prediabetes  >or=6.5%  Consistent with diabetes    High levels of fetal hemoglobin interfere with the HbA1C  assay. Heterozygous hemoglobin variants (HbS, HgC, etc)do  not significantly interfere with this assay.   However, presence of multiple variants may affect accuracy.     08/17/2022 5.4 4.0 - 5.6 % Final     Comment:     ADA Screening Guidelines:  5.7-6.4%  Consistent with prediabetes  >or=6.5%  Consistent with diabetes    High levels of fetal hemoglobin interfere with the HbA1C  assay. Heterozygous hemoglobin variants (HbS, HgC, etc)do  not significantly interfere with this assay.   However, presence of multiple variants may affect accuracy.          Cardiac Enzymes: Ejection Fractions:    No results for input(s): "CPK", "CPKMB", "MB", "TROPONINI" in the last 72 hours. EF   Date Value Ref Range Status   05/08/2024 55 % Final   05/04/2022 60 % Final          No results for input(s): "COLORU", "APPEARANCEUA", "PHUR", "SPECGRAV", "PROTEINUA", "GLUCUA", "KETONESU", "BILIRUBINUA", "OCCULTUA", "NITRITE", "UROBILINOGEN", "LEUKOCYTESUR", "RBCUA", "WBCUA", "BACTERIA", "SQUAMEPITHEL", "HYALINECASTS" in the last 48 hours.    Invalid input(s): "WRIGHTSUR"      Lactate (Lactic Acid) (mmol/L)   Date Value   07/11/2024 1.8   05/21/2024 2.0   05/07/2024 1.2   04/28/2022 1.2   06/25/2021 2.1     BNP (pg/mL)   Date Value   05/07/2024 1,165 (H)   03/01/2024 1,261 (H)   05/12/2022 422 (H)   06/25/2021 178 (H)   06/23/2021 599 (H)     CRP (mg/L)   Date Value   04/09/2021 0.4   08/09/2010 0.7     Sed Rate   Date Value   04/09/2021 <2 mm/Hr   08/09/2010 26 mm/hr (H)     No results found for: "DDIMER"  Ferritin (ng/mL)   Date Value   06/23/2016 98     No results found for: "LDH"  Troponin I (ng/mL)   Date Value   05/08/2024 0.028 (H)   05/08/2024 0.031 (H)   05/07/2024 0.032 (H)   03/01/2024 0.020 "   05/12/2022 0.028 (H)   05/12/2022 0.029 (H)   05/12/2022 0.030 (H)   05/03/2022 0.030 (H)     Results for orders placed or performed in visit on 02/15/23   Vitamin D   Result Value Ref Range    Vit D, 25-Hydroxy 50 30 - 96 ng/mL   Results for orders placed or performed in visit on 05/04/20   Vitamin D   Result Value Ref Range    Vit D, 25-Hydroxy 42 30 - 96 ng/mL   Results for orders placed or performed in visit on 08/22/19   Vitamin D   Result Value Ref Range    Vit D, 25-Hydroxy 44 30 - 96 ng/mL     SARS-CoV2 (COVID-19) Qualitative PCR (no units)   Date Value   08/03/2021 Not Detected   03/08/2021 Not Detected     POC Rapid COVID (no units)   Date Value   04/28/2022 Negative   06/23/2021 Negative       Microbiology labs for the last week  Microbiology Results (last 7 days)       Procedure Component Value Units Date/Time    Blood culture [[id number]] Collected: 07/14/24 1006    Order Status: Completed Specimen: Blood Updated: 07/15/24 1612     Blood Culture, Routine No Growth to date      No Growth to date    Narrative:      From 2 different sites 30 minutes apart  Rt AC    Blood culture [[id number]] Collected: 07/14/24 1006    Order Status: Completed Specimen: Blood Updated: 07/15/24 1612     Blood Culture, Routine No Growth to date      No Growth to date    Narrative:      From 2 different sites 30 minutes apart  Rt hand    Urine culture [[id number]] Collected: 07/12/24 1933    Order Status: Completed Specimen: Urine Updated: 07/14/24 0545     Urine Culture, Routine No significant growth    Narrative:      Specimen Source->Urine    Blood culture #1 **CANNOT BE ORDERED STAT** [[id number]] Collected: 07/12/24 1517    Order Status: Sent Specimen: Blood from Peripheral, Forearm, Right     Blood culture #2 **CANNOT BE ORDERED STAT** [[id number]] Collected: 07/12/24 1516    Order Status: Sent Specimen: Blood from Peripheral, Antecubital, Right             Reviewed and noted in plan where applicable- Please see chart  for full lab data.    Lines/Drains:       Peripheral IV - Single Lumen 07/12/24 1511 22 G Anterior;Right Shoulder (Active)   Site Assessment Clean;Dry;Intact 07/13/24 0012   Extremity Assessment Distal to IV No abnormal discoloration 07/13/24 0012   Line Status Saline locked 07/13/24 0012   Dressing Status Clean;Dry;Intact 07/13/24 0012   Dressing Intervention Integrity maintained 07/13/24 0012   Dressing Change Due 07/16/24 07/13/24 0012   Site Change Due 07/16/24 07/13/24 0012   Number of days: 0       Imaging      Results for orders placed during the hospital encounter of 05/07/24    Echo    Interpretation Summary    Left Ventricle: The left ventricle is normal in size. Ventricular mass is normal. Normal wall thickness. Normal wall motion. There is normal systolic function. Ejection fraction by visual approximation is 55%. Unable to assess diastolic function due to atrial fibrillation.    Right Ventricle: Normal right ventricular cavity size. Wall thickness is normal. Systolic function is mildly reduced.    Aortic Valve: The aortic valve is a trileaflet valve. There is moderate aortic valve sclerosis. There is annular calcification present. Mildly restricted motion. No stenosis.    Tricuspid Valve: There is mild to moderate regurgitation.    Pulmonary Artery: The estimated pulmonary artery systolic pressure is 44 mmHg.    IVC/SVC: Elevated venous pressure at 15 mmHg.      X-Ray Chest 1 View  Narrative: EXAMINATION:  XR CHEST 1 VIEW    CLINICAL HISTORY:  r/o pneumonia;    TECHNIQUE:  Single frontal view of the chest was performed.    COMPARISON:  Chest radiograph 05/07/2024    FINDINGS:  Lines and tubes: Monitoring leads.    Heart and mediastinum: Magnified by AP technique.  Calcifications of the aortic arch.    Pleura: Small pleural effusions bilaterally, left greater than right.  No pneumothorax.    Lungs: Lung volumes are adequate.  No pulmonary edema.  Consolidative opacities in the lung bases bilaterally,  likely atelectasis in the setting of effusions although pneumonia could have a similar appearance.    Soft tissue/bone: Surgical clips in the left axilla.  Osteopenia and degenerative changes.  Impression: As above.    Electronically signed by: Hero Cuevas  Date:    07/16/2024  Time:    13:43      Labs, Imaging, EKG and Diagnostic results from 7/16/2024 were reviewed.    Medications:  Medication list was reviewed and changes noted under Assessment/Plan.  No current facility-administered medications on file prior to encounter.     Current Outpatient Medications on File Prior to Encounter   Medication Sig Dispense Refill    acetaminophen (TYLENOL) 500 MG tablet Take 2 tablets (1,000 mg total) by mouth every 8 (eight) hours as needed for Pain. 42 tablet 0    albuterol (PROVENTIL HFA) 90 mcg/actuation inhaler Inhale 2 puffs into the lungs every 6 (six) hours as needed for Wheezing. Rescue 18 g 3    apixaban (ELIQUIS) 2.5 mg Tab Take 1 tablet (2.5 mg total) by mouth 2 (two) times a day. 60 tablet 11    aspirin (ECOTRIN) 81 MG EC tablet Take 81 mg by mouth once daily.      biotin 1 mg tablet Take 1,000 mcg by mouth once daily.      budesonide-glycopyr-formoterol (BREZTRI AEROSPHERE) 160-9-4.8 mcg/actuation HFAA Inhale 2 puffs into the lungs 2 (two) times daily. 32.1 g 3    busPIRone (BUSPAR) 5 MG Tab Take 5 mg by mouth as needed.      cefadroxil (DURICEF) 500 MG Cap Take 2 capsules (1,000 mg total) by mouth once daily. for 7 days 14 capsule 0    fluticasone propionate (FLONASE) 50 mcg/actuation nasal spray 1 spray by Each Nostril route daily as needed (congestion).      inhalation spacing device Use as directed for inhalation. 1 Device 0    metoprolol succinate (TOPROL-XL) 100 MG 24 hr tablet Take 1 tablet (100 mg total) by mouth once daily. 90 tablet 3    vitamin D (VITAMIN D3) 1000 units Tab Take 2 tablets (2,000 Units total) by mouth once daily. 180 tablet 3    [DISCONTINUED] furosemide (LASIX) 40 MG tablet Take 1  tablet (40 mg total) by mouth 2 (two) times daily. 180 tablet 3    [DISCONTINUED] rosuvastatin (CRESTOR) 5 MG tablet Take 1 tablet (5 mg total) by mouth once daily. (Patient not taking: Reported on 7/10/2024) 90 tablet 3    [DISCONTINUED] tamsulosin (FLOMAX) 0.4 mg Cap Take 1 capsule (0.4 mg total) by mouth once daily. (Patient not taking: Reported on 7/10/2024) 30 capsule 11     Scheduled Medications:  Current Facility-Administered Medications   Medication Dose Route Frequency    ampicillin-sulbactam  3 g Intravenous Q8H    apixaban  2.5 mg Oral BID    aspirin  81 mg Oral Daily    atorvastatin  20 mg Oral Daily    furosemide  40 mg Oral Daily    metoprolol succinate  100 mg Oral Daily    vitamin D  2,000 Units Oral Daily     PRN:   Current Facility-Administered Medications:     acetaminophen, 650 mg, Oral, Q6H PRN    albuterol, 2 puff, Inhalation, Q6H PRN    dextrose 10%, 12.5 g, Intravenous, PRN    dextrose 10%, 25 g, Intravenous, PRN    glucagon (human recombinant), 1 mg, Intramuscular, PRN    glucose, 16 g, Oral, PRN    glucose, 24 g, Oral, PRN    naloxone, 0.02 mg, Intravenous, PRN    sodium chloride 0.9%, 10 mL, Intravenous, Q12H PRN  Infusions:   Estimated Creatinine Clearance: 33.7 mL/min (based on SCr of 1.3 mg/dL).             Assessment/Plan:      * Cellulitis  presents with  left sided painful erythematous rash which extends from her medial elbow to her fifth digit.  was seen at the ER on 7/12/24 and diagnosed with Erysipelas and discharged on antibiotics outpatient. She e was called back after GNR's were seen on her 1/2 blood cultuers     ER course - X ray left elbow -diffuse soft tissue swelling identified. Visualized osseous structures appear unremarkable, with no evidence of recent or healing fracture, lytic destructive process, or appreciable arthritic change noted. No elbow joint effusion is demonstrated. No radiopaque soft tissue foreign body or abnormal gas collection within the soft tissues is  appreciated. US LUE -No thrombus in central veins of the left upper extremity.  Repeat BCX 2 . started on IV Zosyn     ID evaluation . switched to unasyn. continue doxycycline     Leucocytosis  resolved   Recent Labs   Lab 07/13/24  1118 07/14/24  0400 07/15/24  0547   WBC 10.25 6.92 6.34     . Afebrile. BCX 2 likely secondary to sepsis..      Anemia    Patient's with Normocytic anemia.. Hemoglobin stable. Etiology likely due to chronic disease .  Current CBC reviewed-    Recent Labs   Lab 07/11/24  1000   HGB 11.6*         Component Value Date/Time    MCV 93 07/11/2024 1000    RDW 13.8 07/11/2024 1000    IRON 115 06/23/2016 0947    FERRITIN 98 06/23/2016 0947     Monitor CBC and transfuse if H/H drops below 7/21.      Gram-negative bacteremia - Pasturella multocida  GNR's were seen on her 1/2 blood cultures on 7/11. repeat BC x2   7/14 BC 7/11 with Pasteurella multocida . BCX 2 7/12 lost. repeat BCX 2 today .    Diastolic CHF, chronic  Patient is identified as having Diastolic (HFpEF) heart failure that is Chronic. CHF is currently controlled. Latest ECHO performed and demonstrates- Results for orders placed during the hospital encounter of 05/07/24    Echo    Interpretation Summary    Left Ventricle: The left ventricle is normal in size. Ventricular mass is normal. Normal wall thickness. Normal wall motion. There is normal systolic function. Ejection fraction by visual approximation is 55%. Unable to assess diastolic function due to atrial fibrillation.    Right Ventricle: Normal right ventricular cavity size. Wall thickness is normal. Systolic function is mildly reduced.    Aortic Valve: The aortic valve is a trileaflet valve. There is moderate aortic valve sclerosis. There is annular calcification present. Mildly restricted motion. No stenosis.    Tricuspid Valve: There is mild to moderate regurgitation.    Pulmonary Artery: The estimated pulmonary artery systolic pressure is 44 mmHg.    IVC/SVC: Elevated venous  pressure at 15 mmHg.    Hypokalemia  Patient has hypokalemia which is Acute and currently stable. Most recent potassium levels reviewed-   Lab Results   Component Value Date    K 3.9 07/15/2024   . Will continue potassium replacement per protocol and recheck repeat levels after replacement completed.     Chronic anticoagulation  continue eliquis       Chronic respiratory failure with hypoxia  HFpEF, COPD on home O2  on 2L NC baseline     Hyperlipidemia  continue statin       Paroxysmal atrial fibrillation  continue metoprolol and eliquis. last admission 6/27 - had successful RFA of the CTI for treatment of CTI-dependent atrial flutter. No evidence of intra- or post-procedure complications. Post-ablation ECG shows NSR, and no acute abnormalities.     CKD (chronic kidney disease) stage 3, GFR 30-59 ml/min  Creatine stable for now. BMP reviewed- noted Estimated Creatinine Clearance: 31.3 mL/min (based on SCr of 1.4 mg/dL). according to latest data. Monitor UOP and serial BMP and adjust therapy as needed. Renally dose meds.    Recent Labs   Lab 07/13/24  0519 07/14/24  0400 07/15/24  0547   BUN 24* 23 23   CREATININE 1.3 1.6* 1.4       I & O     Intake/Output Summary (Last 24 hours) at 7/15/2024 0744  Last data filed at 7/15/2024 0620  Gross per 24 hour   Intake 660 ml   Output --   Net 660 ml    7/14 Cr 1.2-->1.6. hold lasix for now    Essential hypertension  Chronic, controlled. Latest blood pressure and vitals reviewed-     Temp:  [97.8 °F (36.6 °C)-98 °F (36.7 °C)]   Pulse:  [65-68]   Resp:  [18-20]   BP: (107-109)/(52)   SpO2:  [95 %-99 %] .   Home meds for hypertension were reviewed and noted below.   Hypertension Medications               furosemide (LASIX) 40 MG tablet Take 1 tablet (40 mg total) by mouth 2 (two) times daily.    metoprolol succinate (TOPROL-XL) 100 MG 24 hr tablet Take 1 tablet (100 mg total) by mouth once daily.            While in the hospital, will manage blood pressure as follows; Continue  home antihypertensive regimen    Will utilize p.r.n. blood pressure medication only if patient's blood pressure greater than 180/110 and she develops symptoms such as worsening chest pain or shortness of breath.      VTE Risk Mitigation (From admission, onward)           Ordered     apixaban tablet 2.5 mg  2 times daily         07/12/24 1654                    Discharge Planning   NOHEMI: 7/17/2024     Code Status: Full Code   Is the patient medically ready for discharge?:     Reason for patient still in hospital (select all that apply): Treatment  Discharge Plan A: Home with family, Home Health                  Jarad Dueñas MD  Department of Hospital Medicine   [facility] ([place])

## 2024-07-16 NOTE — PROGRESS NOTES
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   Reports cat scratch to left wrist approximately two days prior to onset of symptoms.    Xray of LUE showed soft tissue swelling, no fracture, no foreign bodies, no effusion.   Venous US was negative for DVT.  Blood cultures of 7/11 now positive for Pasturella multocida.  Repeat cultures from 7/12 and 7/14 are negative.     Afebrile. HDS. Continues on unasyn alone. Per micro, pasteurella bacteremia sensitives should be back tomorrow (send out).     Plan/recommendations:   Continue IV unasyn for now.  Dose adjusted to q 8 hours for renal function (crcl now 34).     Continued judicious LUE elevation - hand above elbow above shoulder.   Awaiting sensitivities from micro, will follow up tomorrow   Plan reviewed with ID staff. ID will follow.           Thank you for your consult. I will follow-up with patient. Please contact us if you have any additional questions.    Keyur Crystal NP  Infectious Disease  [facility] ([place] [id number])    Subjective:     Principal Problem:Cellulitis    HPI: Ms. Misa Barajas is an 78 year old with HTN, HFpEF, COPD on Home O2, pAF s/p ablation on 6/17/24, remote history of breast CA who presented to ED on 7/11 with left arem pain and swelling with rash from elbow to 5th finger. She was diagnosed with erysipelas, given cefadroxil, and discharged.  Called back to ED when a blood culture turned + for GNR.  LUE swelling reportedly started 7-9 days ago.  Developed rash, leaking  clear fluid.  She did note she had a cat scratch on wrist a couple of days ago.  An Xray of SHERLY showed soft tissue swelling, no fracture, no foreign bodies, no effusion.   A venous US was negative.  Blood cultures of 7/11 with one bottle with GNR.  ID pending.  Rapid PCR negative.  Repeats of 7/12 are pending.  She is afebrile, WBC 15.  Currently on doxy and zosyn.  ID is consulted for GNR bacteremia  Interval History:   Blood cultures + for Pasturella mulocida - beta-lactamase negative.   Repeat blood cultures of 7/12, repeats negative.   Swelling, redness improved.     Review of Systems   Constitutional:  Negative for activity change, appetite change, chills, diaphoresis, fatigue and fever.   Cardiovascular:  Positive for leg swelling.   Musculoskeletal:  Positive for arthralgias. Negative for joint swelling.   Skin:  Positive for rash and wound.   Hematological:  Bruises/bleeds easily.   All other systems reviewed and are negative.    Objective:     Vital Signs (Most Recent):  Temp: 97.3 °F (36.3 °C) (07/16/24 1116)  Pulse: 69 (07/16/24 1116)  Resp: 18 (07/16/24 1116)  BP: (!) 124/58 (07/16/24 1116)  SpO2: 97 % (07/16/24 1116) Vital Signs (24h Range):  Temp:  [97.3 °F (36.3 °C)-98.7 °F (37.1 °C)] 97.3 °F (36.3 °C)  Pulse:  [62-99] 69  Resp:  [18] 18  SpO2:  [96 %-98 %] 97 %  BP: (120-144)/(52-70) 124/58     Weight: 75.8 kg (167 lb)  Body mass index is 29.58 kg/m².    Estimated Creatinine Clearance: 33.7 mL/min (based on SCr of 1.3 mg/dL).     Physical Exam  Vitals and nursing note reviewed.   Constitutional:       General: She is not in acute distress.     Appearance: Normal appearance. She is not ill-appearing, toxic-appearing or diaphoretic.   HENT:      Head: Normocephalic and atraumatic.      Mouth/Throat:      Mouth: Mucous membranes are moist.   Eyes:      General: No scleral icterus.     Conjunctiva/sclera: Conjunctivae normal.   Cardiovascular:      Rate and Rhythm: Normal rate and regular rhythm.    Pulmonary:      Effort: Pulmonary effort is normal. No respiratory distress.   Abdominal:      General: There is no distension.      Palpations: Abdomen is soft.      Tenderness: There is no abdominal tenderness.   Musculoskeletal:      Cervical back: Normal range of motion.      Right lower leg: Edema present.      Left lower leg: Edema present.   Skin:     General: Skin is warm and dry.      Findings: Erythema and rash present.      Comments: Significant decrease in erythema and swelling of left arm/hand.  Mild warmth.  Less TTP     Neurological:      General: No focal deficit present.      Mental Status: She is alert and oriented to person, place, and time.   Psychiatric:         Mood and Affect: Mood normal.         Behavior: Behavior normal.                      Significant Labs: Blood Culture:   Recent Labs   Lab 05/09/24  1433 05/09/24  1434 07/11/24  1000 07/11/24  1046 07/14/24  1006   LABBLOO No growth after 5 days. No growth after 5 days. Gram stain rossi bottle: Gram negative rods  Results called to and read back by:Gaviota Viera RN 07/12/2024 09:14  PASTEURELLA MULTOCIDA  Beta Lactamase negative  * No growth after 5 days. No Growth to date  No Growth to date  No Growth to date  No Growth to date     CBC:   Recent Labs   Lab 07/15/24  0547 07/16/24  0929   WBC 6.34 5.80   HGB 10.7* 10.5*   HCT 34.5* 33.8*    193     CMP:   Recent Labs   Lab 07/15/24  0547 07/16/24  0929    143   K 3.9 3.4*    108   CO2 26 28   GLU 81 103   BUN 23 22   CREATININE 1.4 1.3   CALCIUM 9.3 9.2   PROT 5.3* 5.1*   ALBUMIN 2.0* 2.0*   BILITOT 0.8 0.6   ALKPHOS 90 82   AST 26 26   ALT 18 17   ANIONGAP 9 7*     Microbiology Results (last 7 days)       Procedure Component Value Units Date/Time    Blood culture [[id number]] Collected: 07/14/24 1006    Order Status: Completed Specimen: Blood Updated: 07/15/24 1612     Blood Culture, Routine No Growth to date      No Growth to date    Narrative:      From 2  different sites 30 minutes apart  Rt AC    Blood culture [[id number]] Collected: 07/14/24 1006    Order Status: Completed Specimen: Blood Updated: 07/15/24 1612     Blood Culture, Routine No Growth to date      No Growth to date    Narrative:      From 2 different sites 30 minutes apart  Rt hand    Urine culture [[id number]] Collected: 07/12/24 1933    Order Status: Completed Specimen: Urine Updated: 07/14/24 0545     Urine Culture, Routine No significant growth    Narrative:      Specimen Source->Urine    Blood culture #1 **CANNOT BE ORDERED STAT** [[id number]] Collected: 07/12/24 1517    Order Status: Sent Specimen: Blood from Peripheral, Forearm, Right     Blood culture #2 **CANNOT BE ORDERED STAT** [[id number]] Collected: 07/12/24 1516    Order Status: Sent Specimen: Blood from Peripheral, Antecubital, Right             Significant Imaging: None

## 2024-07-16 NOTE — TELEPHONE ENCOUNTER
Called Iqra back, no answer.   Left a voice message        ----- Message from Miracle Saha sent at 7/16/2024  3:01 PM CDT -----  Regarding: FW: Daughter called wld like to speak with someone regarding Mom who's currently in the Hospital  Contact: [phone]  Spoke to daughter she said that her mom is seeing / spoken to  and she also spoken to Veronica Styles while her mom is still in the hospital.  ----- Message -----  From: Isela Segovia MA  Sent: 7/16/2024  10:35 AM CDT  To: Miracle Saha  Subject: FW: Daughter called wld like to speak with sTom Baer's patient  Please call  ----- Message -----  From: Josefina Gauthier  Sent: 7/16/2024  10:00 AM CDT  To: [facility] Staff  Subject: Daughter called wld like to speak with someo#    Name of Who is Calling:Iqra (Daughter)        What is the request in detail:Daughter called wld like to speak with someone regarding Mom thats currently in the Hospital. Please advise         Can the clinic reply by [facility]:No        [facility]: [phone]

## 2024-07-16 NOTE — ASSESSMENT & PLAN NOTE
78 year old woman with multiple co-morbidities (see HPI) presented with LUE pain/swelling/cellulitis and gram negative bacteremia.   Reports cat scratch to left wrist approximately two days prior to onset of symptoms.    Xray of LUE showed soft tissue swelling, no fracture, no foreign bodies, no effusion.   Venous US was negative for DVT.  Blood cultures of 7/11 now positive for Pasturella multocida.  Repeat cultures from 7/12 and 7/14 are negative.     Afebrile. HDS. Continues on unasyn alone. Per micro, pasteurella bacteremia sensitives should be back tomorrow (send out).     Plan/recommendations:   Continue IV unasyn for now.  Dose adjusted to q 8 hours for renal function (crcl now 34).     Continued judicious LUE elevation - hand above elbow above shoulder.   Awaiting sensitivities from micro, will follow up tomorrow   Plan reviewed with ID staff. ID will follow.

## 2024-07-17 ENCOUNTER — TELEPHONE (OUTPATIENT)
Dept: INTERNAL MEDICINE | Facility: CLINIC | Age: 79
End: 2024-07-17
Payer: MEDICARE

## 2024-07-17 LAB
ALBUMIN SERPL BCP-MCNC: 1.9 G/DL (ref 3.5–5.2)
ALP SERPL-CCNC: 86 U/L (ref 55–135)
ALT SERPL W/O P-5'-P-CCNC: 18 U/L (ref 10–44)
ANION GAP SERPL CALC-SCNC: 9 MMOL/L (ref 8–16)
AST SERPL-CCNC: 38 U/L (ref 10–40)
BASOPHILS # BLD AUTO: 0.07 K/UL (ref 0–0.2)
BASOPHILS NFR BLD: 1 % (ref 0–1.9)
BILIRUB SERPL-MCNC: 0.5 MG/DL (ref 0.1–1)
BUN SERPL-MCNC: 21 MG/DL (ref 8–23)
CALCIUM SERPL-MCNC: 9.2 MG/DL (ref 8.7–10.5)
CHLORIDE SERPL-SCNC: 109 MMOL/L (ref 95–110)
CO2 SERPL-SCNC: 24 MMOL/L (ref 23–29)
CREAT SERPL-MCNC: 1.4 MG/DL (ref 0.5–1.4)
DIFFERENTIAL METHOD BLD: ABNORMAL
EOSINOPHIL # BLD AUTO: 0.3 K/UL (ref 0–0.5)
EOSINOPHIL NFR BLD: 4 % (ref 0–8)
ERYTHROCYTE [DISTWIDTH] IN BLOOD BY AUTOMATED COUNT: 13.3 % (ref 11.5–14.5)
EST. GFR  (NO RACE VARIABLE): 38 ML/MIN/1.73 M^2
GLUCOSE SERPL-MCNC: 74 MG/DL (ref 70–110)
HCT VFR BLD AUTO: 32.8 % (ref 37–48.5)
HGB BLD-MCNC: 10.2 G/DL (ref 12–16)
IMM GRANULOCYTES # BLD AUTO: 0.02 K/UL (ref 0–0.04)
IMM GRANULOCYTES NFR BLD AUTO: 0.3 % (ref 0–0.5)
LYMPHOCYTES # BLD AUTO: 1.8 K/UL (ref 1–4.8)
LYMPHOCYTES NFR BLD: 26.6 % (ref 18–48)
MAGNESIUM SERPL-MCNC: 1.8 MG/DL (ref 1.6–2.6)
MCH RBC QN AUTO: 28.8 PG (ref 27–31)
MCHC RBC AUTO-ENTMCNC: 31.1 G/DL (ref 32–36)
MCV RBC AUTO: 93 FL (ref 82–98)
MONOCYTES # BLD AUTO: 0.9 K/UL (ref 0.3–1)
MONOCYTES NFR BLD: 12.8 % (ref 4–15)
NEUTROPHILS # BLD AUTO: 3.7 K/UL (ref 1.8–7.7)
NEUTROPHILS NFR BLD: 55.3 % (ref 38–73)
NRBC BLD-RTO: 0 /100 WBC
PHOSPHATE SERPL-MCNC: 2.6 MG/DL (ref 2.7–4.5)
PLATELET # BLD AUTO: 191 K/UL (ref 150–450)
PMV BLD AUTO: 10.2 FL (ref 9.2–12.9)
POTASSIUM SERPL-SCNC: 4 MMOL/L (ref 3.5–5.1)
PROT SERPL-MCNC: 5.2 G/DL (ref 6–8.4)
RBC # BLD AUTO: 3.54 M/UL (ref 4–5.4)
SODIUM SERPL-SCNC: 142 MMOL/L (ref 136–145)
WBC # BLD AUTO: 6.74 K/UL (ref 3.9–12.7)

## 2024-07-17 PROCEDURE — 21400001 HC TELEMETRY ROOM

## 2024-07-17 PROCEDURE — C1751 CATH, INF, PER/CENT/MIDLINE: HCPCS

## 2024-07-17 PROCEDURE — 84100 ASSAY OF PHOSPHORUS: CPT | Performed by: HOSPITALIST

## 2024-07-17 PROCEDURE — 36410 VNPNXR 3YR/> PHY/QHP DX/THER: CPT

## 2024-07-17 PROCEDURE — 99233 SBSQ HOSP IP/OBS HIGH 50: CPT | Mod: ,,, | Performed by: REGISTERED NURSE

## 2024-07-17 PROCEDURE — 25000003 PHARM REV CODE 250: Performed by: HOSPITALIST

## 2024-07-17 PROCEDURE — 36415 COLL VENOUS BLD VENIPUNCTURE: CPT | Performed by: HOSPITALIST

## 2024-07-17 PROCEDURE — 76937 US GUIDE VASCULAR ACCESS: CPT

## 2024-07-17 PROCEDURE — 85025 COMPLETE CBC W/AUTO DIFF WBC: CPT | Performed by: HOSPITALIST

## 2024-07-17 PROCEDURE — 80053 COMPREHEN METABOLIC PANEL: CPT | Performed by: HOSPITALIST

## 2024-07-17 PROCEDURE — 83735 ASSAY OF MAGNESIUM: CPT | Performed by: HOSPITALIST

## 2024-07-17 PROCEDURE — 63600175 PHARM REV CODE 636 W HCPCS: Performed by: HOSPITALIST

## 2024-07-17 RX ORDER — SODIUM,POTASSIUM PHOSPHATES 280-250MG
1 POWDER IN PACKET (EA) ORAL ONCE
Status: COMPLETED | OUTPATIENT
Start: 2024-07-17 | End: 2024-07-17

## 2024-07-17 RX ORDER — SODIUM CHLORIDE 0.9 % (FLUSH) 0.9 %
10 SYRINGE (ML) INJECTION
Status: DISCONTINUED | OUTPATIENT
Start: 2024-07-17 | End: 2024-07-18 | Stop reason: HOSPADM

## 2024-07-17 RX ORDER — SODIUM CHLORIDE 0.9 % (FLUSH) 0.9 %
10 SYRINGE (ML) INJECTION EVERY 6 HOURS
Status: DISCONTINUED | OUTPATIENT
Start: 2024-07-17 | End: 2024-07-18 | Stop reason: HOSPADM

## 2024-07-17 RX ADMIN — CHOLECALCIFEROL TAB 25 MCG (1000 UNIT) 2000 UNITS: 25 TAB at 09:07

## 2024-07-17 RX ADMIN — ASPIRIN 81 MG: 81 TABLET, COATED ORAL at 09:07

## 2024-07-17 RX ADMIN — POTASSIUM & SODIUM PHOSPHATES POWDER PACK 280-160-250 MG 1 PACKET: 280-160-250 PACK at 09:07

## 2024-07-17 RX ADMIN — AMPICILLIN SODIUM AND SULBACTAM SODIUM 3 G: 2; 1 INJECTION, POWDER, FOR SOLUTION INTRAMUSCULAR; INTRAVENOUS at 09:07

## 2024-07-17 RX ADMIN — APIXABAN 2.5 MG: 2.5 TABLET, FILM COATED ORAL at 09:07

## 2024-07-17 RX ADMIN — FUROSEMIDE 40 MG: 40 TABLET ORAL at 09:07

## 2024-07-17 RX ADMIN — ATORVASTATIN CALCIUM 20 MG: 20 TABLET, FILM COATED ORAL at 09:07

## 2024-07-17 RX ADMIN — AMPICILLIN SODIUM AND SULBACTAM SODIUM 3 G: 2; 1 INJECTION, POWDER, FOR SOLUTION INTRAMUSCULAR; INTRAVENOUS at 02:07

## 2024-07-17 RX ADMIN — CEFTRIAXONE 2 G: 2 INJECTION, POWDER, FOR SOLUTION INTRAMUSCULAR; INTRAVENOUS at 04:07

## 2024-07-17 RX ADMIN — METOPROLOL SUCCINATE 100 MG: 50 TABLET, EXTENDED RELEASE ORAL at 09:07

## 2024-07-17 RX ADMIN — APIXABAN 2.5 MG: 2.5 TABLET, FILM COATED ORAL at 08:07

## 2024-07-17 NOTE — SUBJECTIVE & OBJECTIVE
Interval History:   Blood cultures + for Pasturella mulocida - beta-lactamase negative. Awaiting sensitivities from micro.   Repeat blood cultures of 7/12, repeats negative.   Swelling, redness improved.     Review of Systems   Constitutional:  Negative for activity change, appetite change, chills, diaphoresis, fatigue and fever.   Cardiovascular:  Positive for leg swelling.   Musculoskeletal:  Positive for arthralgias. Negative for joint swelling.   Skin:  Positive for wound. Negative for rash.   Hematological:  Bruises/bleeds easily.   All other systems reviewed and are negative.    Objective:     Vital Signs (Most Recent):  Temp: 97.4 °F (36.3 °C) (07/17/24 1112)  Pulse: 67 (07/17/24 1112)  Resp: 18 (07/17/24 1112)  BP: (!) 147/70 (07/17/24 1112)  SpO2: 96 % (07/17/24 1112) Vital Signs (24h Range):  Temp:  [97.4 °F (36.3 °C)-98.9 °F (37.2 °C)] 97.4 °F (36.3 °C)  Pulse:  [] 67  Resp:  [18-19] 18  SpO2:  [93 %-99 %] 96 %  BP: ()/(50-76) 147/70     Weight: 75.8 kg (167 lb)  Body mass index is 29.58 kg/m².    Estimated Creatinine Clearance: 31.3 mL/min (based on SCr of 1.4 mg/dL).     Physical Exam  Vitals and nursing note reviewed.   Constitutional:       General: She is not in acute distress.     Appearance: Normal appearance. She is not ill-appearing, toxic-appearing or diaphoretic.   HENT:      Head: Normocephalic and atraumatic.      Mouth/Throat:      Mouth: Mucous membranes are moist.   Eyes:      General: No scleral icterus.     Conjunctiva/sclera: Conjunctivae normal.   Cardiovascular:      Rate and Rhythm: Normal rate and regular rhythm.   Pulmonary:      Effort: Pulmonary effort is normal. No respiratory distress.   Abdominal:      General: There is no distension.      Palpations: Abdomen is soft.      Tenderness: There is no abdominal tenderness.   Musculoskeletal:      Cervical back: Normal range of motion.      Right lower leg: Edema present.      Left lower leg: Edema present.   Skin:      General: Skin is warm and dry.      Findings: No erythema or rash.      Comments: Continues with significant decrease in erythema and swelling of left arm/hand. No warmth, no TTP.      Neurological:      General: No focal deficit present.      Mental Status: She is alert and oriented to person, place, and time.   Psychiatric:         Mood and Affect: Mood normal.         Behavior: Behavior normal.                      Significant Labs: Blood Culture:   Recent Labs   Lab 05/09/24  1433 05/09/24  1434 07/11/24  1000 07/11/24  1046 07/14/24  1006   LABBLOO No growth after 5 days. No growth after 5 days. Gram stain rossi bottle: Gram negative rods  Results called to and read back by:Gaviota Viera RN 07/12/2024 09:14  PASTEURELLA MULTOCIDA  Beta Lactamase negative  * No growth after 5 days. No Growth to date  No Growth to date  No Growth to date  No Growth to date  No Growth to date  No Growth to date     CBC:   Recent Labs   Lab 07/16/24  0929 07/17/24  0338   WBC 5.80 6.74   HGB 10.5* 10.2*   HCT 33.8* 32.8*    191     CMP:   Recent Labs   Lab 07/16/24  0929 07/17/24  0338    142   K 3.4* 4.0    109   CO2 28 24    74   BUN 22 21   CREATININE 1.3 1.4   CALCIUM 9.2 9.2   PROT 5.1* 5.2*   ALBUMIN 2.0* 1.9*   BILITOT 0.6 0.5   ALKPHOS 82 86   AST 26 38   ALT 17 18   ANIONGAP 7* 9     Microbiology Results (last 7 days)       Procedure Component Value Units Date/Time    Blood culture [[id number]] Collected: 07/14/24 1006    Order Status: Completed Specimen: Blood Updated: 07/16/24 1612     Blood Culture, Routine No Growth to date      No Growth to date      No Growth to date    Narrative:      From 2 different sites 30 minutes apart  Rt hand    Blood culture [[id number]] Collected: 07/14/24 1006    Order Status: Completed Specimen: Blood Updated: 07/16/24 1612     Blood Culture, Routine No Growth to date      No Growth to date      No Growth to date    Narrative:      From 2 different sites  30 minutes apart  Rt AC    Urine culture [[id number]] Collected: 07/12/24 1933    Order Status: Completed Specimen: Urine Updated: 07/14/24 0545     Urine Culture, Routine No significant growth    Narrative:      Specimen Source->Urine    Blood culture #1 **CANNOT BE ORDERED STAT** [[id number]] Collected: 07/12/24 1517    Order Status: Sent Specimen: Blood from Peripheral, Forearm, Right     Blood culture #2 **CANNOT BE ORDERED STAT** [[id number]] Collected: 07/12/24 1516    Order Status: Sent Specimen: Blood from Peripheral, Antecubital, Right             Significant Imaging: None

## 2024-07-17 NOTE — ASSESSMENT & PLAN NOTE
78 year old woman with multiple co-morbidities (see HPI) presented with LUE pain/swelling/cellulitis and gram negative bacteremia.   Reports cat scratch to left wrist approximately two days prior to onset of symptoms.    Xray of LUE showed soft tissue swelling, no fracture, no foreign bodies, no effusion.   Venous US was negative for DVT.  Blood cultures of 7/11 now positive for Pasturella multocida.  Repeat cultures from 7/12 and 7/14 are negative.     Afebrile. HDS. Continues on unasyn alone. Per micro, pasteurella bacteremia sensitives should be back tomorrow (send out).     Plan/recommendations:  Will stop unasyn. Start ceftriaxone 2g IV q24 while awaiting micro sensitivities. Will anticipate 14d from cleared culture, EOC 7/26/24.   Continue elevation   Will follow up outpatinet. Okay to place line   Plan reviewed with ID staff. ID will sign off. See OPAT below:    Outpatient Antibiotic Therapy Plan:    Please send referral to [facility].    1) Infection: pasturella bacteremia, beta lactamase negative.     2) Discharge Antibiotics:    Intravenous antibiotics:  Ceftriaxone 2g IV q24 hr       3) Therapy Duration:  2 weeks from cleared culture     Estimated end date of IV antibiotics: 7/26/24    4) Outpatient Weekly Labs:    Order the following labs to be drawn on Mondays:   CBC  CMP   CRP    5) Fax Lab Results to Infectious Diseases Provider: ESEQUIEL Singh FNP-C    [facility] Fax Number: [phone]    6) Outpatient Infectious Diseases Follow-up    Follow-up appointment will be arranged by the ID clinic and will be found in the patient's appointments tab.    Prior to discharge, please ensure the patient's follow-up has been scheduled.    If there is still no follow-up scheduled prior to discharge, please [facility] or Call Infectious Diseases Dept.

## 2024-07-17 NOTE — ASSESSMENT & PLAN NOTE
GNR's were seen on her 1/2 blood cultures on 7/11. repeat BC x2   7/14 BC 7/11 with Pasteurella multocida . BCX 2 7/12 lost. repeat BCX 2 today .  7/17 BCx2 7/14. NGTD.  Unasyn discontinued. ID recs ceftriaxone 2g IV q24 while awaiting micro sensitivities.anticipate 14d from cleared culture( end date 7/26/24). ID to follow up cultures.   discharge with home health

## 2024-07-17 NOTE — PROGRESS NOTES
[facility] ([address] Medicine  Progress Note    Patient Name: Misa Barajas  MRN: [id number]  Patient Class: IP- Inpatient   Admission Date: 7/12/2024  Length of Stay: 5 days  Attending Physician: Jarad Dueñas MD  Primary Care Provider: Celia Carlisle MD        Subjective:     Principal Problem:Cellulitis        HPI:  Misa Barajas is a 78 y.o. female with a PMHx of HTN, HLD, HFpEF, COPD on home O2 (2L NC baseline O2 sat 92%) and pAF presents with  left sided painful erythematous rash which extends from her medial elbow to her fifth digit.     AAOX3. accompanied by daughters at the bedside. patient was seen at the ER on 7/12/24 and diagnosed with Erysipelas and discharged on cefadroxil outpatient. She e was called back after GNR's were seen on her 1/2 blood cultuers.  c/o  small bout of diarrhea . denies new symptoms. reports LUE  swelling started 7-9 days ago when she noticed her arm was swelling and leaking clear fluid - associated with elbow was red and tender.  reports that her cat scratched her near left wrist a couple of days ago. c/o  subjective fever.       ER course - X ray left elbow -diffuse soft tissue swelling identified. Visualized osseous structures appear unremarkable, with no evidence of recent or healing fracture, lytic destructive process, or appreciable arthritic change noted. No elbow joint effusion is demonstrated. No radiopaque soft tissue foreign body or abnormal gas collection within the soft tissues is appreciated. US LUE -No thrombus in central veins of the left upper extremity.  Repeat BCX 2 . started on IV Zosyn     last admission 6/27 - had successful RFA of the CTI for treatment of CTI-dependent atrial flutter. No evidence of intra- or post-procedure complications. Post-ablation ECG shows NSR, and no acute abnormalities.       Overview/Hospital Course:  7/13 K replaced. Nutrition consulted for low albumin. LE stockings and elevate LUE. lasix  40mg BID PRN. switched to unasyn   7/14 BC 7/11 with Pasteurella multocida . BCX 2 7/12 lost. repeat BCX 2 today . Cr 1.2-->1.6. hold lasix for now  7/15 LE erythema and swelling improved. Nutritin evaluation . continue boost . Cr 1.4. continue PO lasix   7/16 Awaiting sensitivities from BC.   7/17 BCx2 7/14. NGTD.  Unasyn discontinued. ID recs ceftriaxone 2g IV q24 while awaiting micro sensitivities.anticipate 14d from cleared culture( end date 7/26/24). ID to follow up cultures.   discharge with home health         Review of Systems:   Pain scale:    Constitutional:  fever,  chills, headache, vision loss, hearing loss, weight loss, Generalized weakness, falls, loss of smell, loss of taste, poor appetite,  sore throat  Respiratory: cough, shortness of breath.   Cardiovascular: chest pain, dizziness, palpitations, orthopnea, swelling of feet, syncope  Gastrointestinal: nausea, vomiting, abdominal pain, diarrhea, black stool,  blood in stool, change in bowel habits, constipation  Genitourinary: hematuria, dysuria, urgency, frequency  Integument/Breast: rash,  pruritis, erythema - improved   Hematologic/Lymphatic: easy bruising, lymphadenopathy  Musculoskeletal: arthralgias , myalgias, back pain, neck pain, knee pain  Neurological: confusion, seizures, tremors, slurred speech  Behavioral/Psych:  depression, anxiety, auditory or visual hallucinations     OBJECTIVE:     Physical Exam:  Body mass index is 29.58 kg/m².    Constitutional: Appears well-developed and well-nourished.   Head: Normocephalic and atraumatic.   Neck: Normal range of motion. Neck supple.   Cardiovascular: Normal heart rate.  Regular heart rhythm.  Pulmonary/Chest: Effort normal.   Abdominal: No distension.  No tenderness  Musculoskeletal: Normal range of motion.  erythematous rash over left upper extremity extending from hand to mid humerus with Induration, small scratch over the volar aspect of the left forearm.   +  edema -B/L LE and LUE  "  Neurological: Alert and oriented to person, place, and time.   Skin: Skin is warm and dry.   Psychiatric: Normal mood and affect. Behavior is normal.       Vital Signs  Temp: 97.4 °F (36.3 °C) (07/17/24 1112)  Pulse: 67 (07/17/24 1112)  Resp: 18 (07/17/24 1112)  BP: (!) 147/70 (07/17/24 1112)  SpO2: 96 % (07/17/24 1112)     24 Hour VS Range    Temp:  [97.4 °F (36.3 °C)-98.9 °F (37.2 °C)]   Pulse:  []   Resp:  [18-19]   BP: ()/(50-76)   SpO2:  [93 %-99 %]     Intake/Output Summary (Last 24 hours) at 7/17/2024 1518  Last data filed at 7/17/2024 0557  Gross per 24 hour   Intake 180 ml   Output --   Net 180 ml           I/O This Shift:  No intake/output data recorded.    Wt Readings from Last 3 Encounters:   07/12/24 75.8 kg (167 lb)   07/11/24 75.8 kg (167 lb)   07/08/24 77.1 kg (169 lb 15.6 oz)       I have personally reviewed the vitals and recorded Intake/Output     Laboratory/Diagnostic Data:    CBC/Anemia Labs: Coags:    Recent Labs   Lab 07/15/24  0547 07/16/24  0929 07/17/24  0338   WBC 6.34 5.80 6.74   HGB 10.7* 10.5* 10.2*   HCT 34.5* 33.8* 32.8*    193 191   MCV 94 93 93   RDW 13.6 13.6 13.3    No results for input(s): "PT", "INR", "APTT" in the last 168 hours.     Chemistries: ABG:   Recent Labs   Lab 07/15/24  0547 07/16/24  0929 07/17/24  0338    143 142   K 3.9 3.4* 4.0    108 109   CO2 26 28 24   BUN 23 22 21   CREATININE 1.4 1.3 1.4   CALCIUM 9.3 9.2 9.2   PROT 5.3* 5.1* 5.2*   BILITOT 0.8 0.6 0.5   ALKPHOS 90 82 86   ALT 18 17 18   AST 26 26 38   MG 1.8 1.8 1.8   PHOS 3.2 2.7 2.6*    No results for input(s): "PH", "PCO2", "PO2", "HCO3", "POCSATURATED", "BE" in the last 168 hours.     POCT Glucose: HbA1c:    No results for input(s): "POCTGLUCOSE" in the last 168 hours. Hemoglobin A1C   Date Value Ref Range Status   05/08/2024 5.3 4.0 - 5.6 % Final     Comment:     ADA Screening Guidelines:  5.7-6.4%  Consistent with prediabetes  >or=6.5%  Consistent with diabetes    High " "levels of fetal hemoglobin interfere with the HbA1C  assay. Heterozygous hemoglobin variants (HbS, HgC, etc)do  not significantly interfere with this assay.   However, presence of multiple variants may affect accuracy.     03/01/2024 5.4 4.0 - 5.6 % Final     Comment:     ADA Screening Guidelines:  5.7-6.4%  Consistent with prediabetes  >or=6.5%  Consistent with diabetes    High levels of fetal hemoglobin interfere with the HbA1C  assay. Heterozygous hemoglobin variants (HbS, HgC, etc)do  not significantly interfere with this assay.   However, presence of multiple variants may affect accuracy.     08/17/2022 5.4 4.0 - 5.6 % Final     Comment:     ADA Screening Guidelines:  5.7-6.4%  Consistent with prediabetes  >or=6.5%  Consistent with diabetes    High levels of fetal hemoglobin interfere with the HbA1C  assay. Heterozygous hemoglobin variants (HbS, HgC, etc)do  not significantly interfere with this assay.   However, presence of multiple variants may affect accuracy.          Cardiac Enzymes: Ejection Fractions:    No results for input(s): "CPK", "CPKMB", "MB", "TROPONINI" in the last 72 hours. EF   Date Value Ref Range Status   05/08/2024 55 % Final   05/04/2022 60 % Final          No results for input(s): "COLORU", "APPEARANCEUA", "PHUR", "SPECGRAV", "PROTEINUA", "GLUCUA", "KETONESU", "BILIRUBINUA", "OCCULTUA", "NITRITE", "UROBILINOGEN", "LEUKOCYTESUR", "RBCUA", "WBCUA", "BACTERIA", "SQUAMEPITHEL", "HYALINECASTS" in the last 48 hours.    Invalid input(s): "WRIGHTSUR"      Lactate (Lactic Acid) (mmol/L)   Date Value   07/11/2024 1.8   05/21/2024 2.0   05/07/2024 1.2   04/28/2022 1.2   06/25/2021 2.1     BNP (pg/mL)   Date Value   05/07/2024 1,165 (H)   03/01/2024 1,261 (H)   05/12/2022 422 (H)   06/25/2021 178 (H)   06/23/2021 599 (H)     CRP (mg/L)   Date Value   04/09/2021 0.4   08/09/2010 0.7     Sed Rate   Date Value   04/09/2021 <2 mm/Hr   08/09/2010 26 mm/hr (H)     No results found for: "DDIMER"  Ferritin " "(ng/mL)   Date Value   06/23/2016 98     No results found for: "LDH"  Troponin I (ng/mL)   Date Value   05/08/2024 0.028 (H)   05/08/2024 0.031 (H)   05/07/2024 0.032 (H)   03/01/2024 0.020   05/12/2022 0.028 (H)   05/12/2022 0.029 (H)   05/12/2022 0.030 (H)   05/03/2022 0.030 (H)     Results for orders placed or performed in visit on 02/15/23   Vitamin D   Result Value Ref Range    Vit D, 25-Hydroxy 50 30 - 96 ng/mL   Results for orders placed or performed in visit on 05/04/20   Vitamin D   Result Value Ref Range    Vit D, 25-Hydroxy 42 30 - 96 ng/mL   Results for orders placed or performed in visit on 08/22/19   Vitamin D   Result Value Ref Range    Vit D, 25-Hydroxy 44 30 - 96 ng/mL     SARS-CoV2 (COVID-19) Qualitative PCR (no units)   Date Value   08/03/2021 Not Detected   03/08/2021 Not Detected     POC Rapid COVID (no units)   Date Value   04/28/2022 Negative   06/23/2021 Negative       Microbiology labs for the last week  Microbiology Results (last 7 days)       Procedure Component Value Units Date/Time    Blood culture [[id number]] Collected: 07/14/24 1006    Order Status: Completed Specimen: Blood Updated: 07/16/24 1612     Blood Culture, Routine No Growth to date      No Growth to date      No Growth to date    Narrative:      From 2 different sites 30 minutes apart  Rt hand    Blood culture [[id number]] Collected: 07/14/24 1006    Order Status: Completed Specimen: Blood Updated: 07/16/24 1612     Blood Culture, Routine No Growth to date      No Growth to date      No Growth to date    Narrative:      From 2 different sites 30 minutes apart  Rt AC    Urine culture [[id number]] Collected: 07/12/24 1933    Order Status: Completed Specimen: Urine Updated: 07/14/24 0545     Urine Culture, Routine No significant growth    Narrative:      Specimen Source->Urine    Blood culture #1 **CANNOT BE ORDERED STAT** [[id number]] Collected: 07/12/24 1517    Order Status: Sent Specimen: Blood from Peripheral, Forearm, " Right     Blood culture #2 **CANNOT BE ORDERED STAT** [[id number]] Collected: 07/12/24 1516    Order Status: Sent Specimen: Blood from Peripheral, Antecubital, Right             Reviewed and noted in plan where applicable- Please see chart for full lab data.    Lines/Drains:       Peripheral IV - Single Lumen 07/12/24 1511 22 G Anterior;Right Shoulder (Active)   Site Assessment Clean;Dry;Intact 07/13/24 0012   Extremity Assessment Distal to IV No abnormal discoloration 07/13/24 0012   Line Status Saline locked 07/13/24 0012   Dressing Status Clean;Dry;Intact 07/13/24 0012   Dressing Intervention Integrity maintained 07/13/24 0012   Dressing Change Due 07/16/24 07/13/24 0012   Site Change Due 07/16/24 07/13/24 0012   Number of days: 0       Imaging      Results for orders placed during the hospital encounter of 05/07/24    Echo    Interpretation Summary    Left Ventricle: The left ventricle is normal in size. Ventricular mass is normal. Normal wall thickness. Normal wall motion. There is normal systolic function. Ejection fraction by visual approximation is 55%. Unable to assess diastolic function due to atrial fibrillation.    Right Ventricle: Normal right ventricular cavity size. Wall thickness is normal. Systolic function is mildly reduced.    Aortic Valve: The aortic valve is a trileaflet valve. There is moderate aortic valve sclerosis. There is annular calcification present. Mildly restricted motion. No stenosis.    Tricuspid Valve: There is mild to moderate regurgitation.    Pulmonary Artery: The estimated pulmonary artery systolic pressure is 44 mmHg.    IVC/SVC: Elevated venous pressure at 15 mmHg.      X-Ray Chest 1 View  Narrative: EXAMINATION:  XR CHEST 1 VIEW    CLINICAL HISTORY:  r/o pneumonia;    TECHNIQUE:  Single frontal view of the chest was performed.    COMPARISON:  Chest radiograph 05/07/2024    FINDINGS:  Lines and tubes: Monitoring leads.    Heart and mediastinum: Magnified by AP technique.   Calcifications of the aortic arch.    Pleura: Small pleural effusions bilaterally, left greater than right.  No pneumothorax.    Lungs: Lung volumes are adequate.  No pulmonary edema.  Consolidative opacities in the lung bases bilaterally, likely atelectasis in the setting of effusions although pneumonia could have a similar appearance.    Soft tissue/bone: Surgical clips in the left axilla.  Osteopenia and degenerative changes.  Impression: As above.    Electronically signed by: Hero Cuevas  Date:    07/16/2024  Time:    13:43      Labs, Imaging, EKG and Diagnostic results from 7/17/2024 were reviewed.    Medications:  Medication list was reviewed and changes noted under Assessment/Plan.  No current facility-administered medications on file prior to encounter.     Current Outpatient Medications on File Prior to Encounter   Medication Sig Dispense Refill    acetaminophen (TYLENOL) 500 MG tablet Take 2 tablets (1,000 mg total) by mouth every 8 (eight) hours as needed for Pain. 42 tablet 0    albuterol (PROVENTIL HFA) 90 mcg/actuation inhaler Inhale 2 puffs into the lungs every 6 (six) hours as needed for Wheezing. Rescue 18 g 3    apixaban (ELIQUIS) 2.5 mg Tab Take 1 tablet (2.5 mg total) by mouth 2 (two) times a day. 60 tablet 11    aspirin (ECOTRIN) 81 MG EC tablet Take 81 mg by mouth once daily.      biotin 1 mg tablet Take 1,000 mcg by mouth once daily.      budesonide-glycopyr-formoterol (BREZTRI AEROSPHERE) 160-9-4.8 mcg/actuation HFAA Inhale 2 puffs into the lungs 2 (two) times daily. 32.1 g 3    busPIRone (BUSPAR) 5 MG Tab Take 5 mg by mouth as needed.      fluticasone propionate (FLONASE) 50 mcg/actuation nasal spray 1 spray by Each Nostril route daily as needed (congestion).      inhalation spacing device Use as directed for inhalation. 1 Device 0    metoprolol succinate (TOPROL-XL) 100 MG 24 hr tablet Take 1 tablet (100 mg total) by mouth once daily. 90 tablet 3    vitamin D (VITAMIN D3) 1000 units Tab  Take 2 tablets (2,000 Units total) by mouth once daily. 180 tablet 3    [DISCONTINUED] cefadroxil (DURICEF) 500 MG Cap Take 2 capsules (1,000 mg total) by mouth once daily. for 7 days 14 capsule 0     Scheduled Medications:  Current Facility-Administered Medications   Medication Dose Route Frequency    apixaban  2.5 mg Oral BID    aspirin  81 mg Oral Daily    atorvastatin  20 mg Oral Daily    cefTRIAXone (Rocephin) IV (PEDS and ADULTS)  2 g Intravenous Q24H    furosemide  40 mg Oral Daily    metoprolol succinate  100 mg Oral Daily    vitamin D  2,000 Units Oral Daily     PRN:   Current Facility-Administered Medications:     acetaminophen, 650 mg, Oral, Q6H PRN    albuterol, 2 puff, Inhalation, Q6H PRN    dextrose 10%, 12.5 g, Intravenous, PRN    dextrose 10%, 25 g, Intravenous, PRN    glucagon (human recombinant), 1 mg, Intramuscular, PRN    glucose, 16 g, Oral, PRN    glucose, 24 g, Oral, PRN    naloxone, 0.02 mg, Intravenous, PRN    sodium chloride 0.9%, 10 mL, Intravenous, Q12H PRN  Infusions:   Estimated Creatinine Clearance: 31.3 mL/min (based on SCr of 1.4 mg/dL).             Assessment/Plan:      * Cellulitis  presents with  left sided painful erythematous rash which extends from her medial elbow to her fifth digit.  was seen at the ER on 7/12/24 and diagnosed with Erysipelas and discharged on antibiotics outpatient. She e was called back after GNR's were seen on her 1/2 blood cultuers     ER course - X ray left elbow -diffuse soft tissue swelling identified. Visualized osseous structures appear unremarkable, with no evidence of recent or healing fracture, lytic destructive process, or appreciable arthritic change noted. No elbow joint effusion is demonstrated. No radiopaque soft tissue foreign body or abnormal gas collection within the soft tissues is appreciated. US LUE -No thrombus in central veins of the left upper extremity.  Repeat BCX 2 . started on IV Zosyn     ID evaluation . switched to unasyn.  continue doxycycline     Leucocytosis  resolved   Recent Labs   Lab 07/15/24  0547 07/16/24  0929 07/17/24  0338   WBC 6.34 5.80 6.74       . Afebrile. BCX 2 likely secondary to sepsis..      Anemia    Patient's with Normocytic anemia.. Hemoglobin stable. Etiology likely due to chronic disease .  Current CBC reviewed-    Recent Labs   Lab 07/11/24  1000   HGB 11.6*         Component Value Date/Time    MCV 93 07/11/2024 1000    RDW 13.8 07/11/2024 1000    IRON 115 06/23/2016 0947    FERRITIN 98 06/23/2016 0947     Monitor CBC and transfuse if H/H drops below 7/21.      Gram-negative bacteremia - Pasturella multocida  GNR's were seen on her 1/2 blood cultures on 7/11. repeat BC x2   7/14 BC 7/11 with Pasteurella multocida . BCX 2 7/12 lost. repeat BCX 2 today .  7/17 BCx2 7/14. NGTD.  Unasyn discontinued. ID recs ceftriaxone 2g IV q24 while awaiting micro sensitivities.anticipate 14d from cleared culture( end date 7/26/24). ID to follow up cultures.   discharge with home health     Diastolic CHF, chronic  Patient is identified as having Diastolic (HFpEF) heart failure that is Chronic. CHF is currently controlled. Latest ECHO performed and demonstrates- Results for orders placed during the hospital encounter of 05/07/24    Echo    Interpretation Summary    Left Ventricle: The left ventricle is normal in size. Ventricular mass is normal. Normal wall thickness. Normal wall motion. There is normal systolic function. Ejection fraction by visual approximation is 55%. Unable to assess diastolic function due to atrial fibrillation.    Right Ventricle: Normal right ventricular cavity size. Wall thickness is normal. Systolic function is mildly reduced.    Aortic Valve: The aortic valve is a trileaflet valve. There is moderate aortic valve sclerosis. There is annular calcification present. Mildly restricted motion. No stenosis.    Tricuspid Valve: There is mild to moderate regurgitation.    Pulmonary Artery: The estimated  pulmonary artery systolic pressure is 44 mmHg.    IVC/SVC: Elevated venous pressure at 15 mmHg.    Hypokalemia  Patient has hypokalemia which is Acute and currently stable. Most recent potassium levels reviewed-   Lab Results   Component Value Date    K 3.9 07/15/2024   . Will continue potassium replacement per protocol and recheck repeat levels after replacement completed.     Chronic anticoagulation  continue eliquis       Chronic respiratory failure with hypoxia  HFpEF, COPD on home O2  on 2L NC baseline     Hyperlipidemia  continue statin       Paroxysmal atrial fibrillation  continue metoprolol and eliquis. last admission 6/27 - had successful RFA of the CTI for treatment of CTI-dependent atrial flutter. No evidence of intra- or post-procedure complications. Post-ablation ECG shows NSR, and no acute abnormalities.     CKD (chronic kidney disease) stage 3, GFR 30-59 ml/min  Creatine stable for now. BMP reviewed- noted Estimated Creatinine Clearance: 31.3 mL/min (based on SCr of 1.4 mg/dL). according to latest data. Monitor UOP and serial BMP and adjust therapy as needed. Renally dose meds.    Recent Labs   Lab 07/13/24  0519 07/14/24  0400 07/15/24  0547   BUN 24* 23 23   CREATININE 1.3 1.6* 1.4       I & O     Intake/Output Summary (Last 24 hours) at 7/15/2024 0744  Last data filed at 7/15/2024 0620  Gross per 24 hour   Intake 660 ml   Output --   Net 660 ml    7/14 Cr 1.2-->1.6. hold lasix for now    Essential hypertension  Chronic, controlled. Latest blood pressure and vitals reviewed-     Temp:  [97.8 °F (36.6 °C)-98 °F (36.7 °C)]   Pulse:  [65-68]   Resp:  [18-20]   BP: (107-109)/(52)   SpO2:  [95 %-99 %] .   Home meds for hypertension were reviewed and noted below.   Hypertension Medications               furosemide (LASIX) 40 MG tablet Take 1 tablet (40 mg total) by mouth 2 (two) times daily.    metoprolol succinate (TOPROL-XL) 100 MG 24 hr tablet Take 1 tablet (100 mg total) by mouth once daily.             While in the hospital, will manage blood pressure as follows; Continue home antihypertensive regimen    Will utilize p.r.n. blood pressure medication only if patient's blood pressure greater than 180/110 and she develops symptoms such as worsening chest pain or shortness of breath.      VTE Risk Mitigation (From admission, onward)           Ordered     apixaban tablet 2.5 mg  2 times daily         07/12/24 1654                    Discharge Planning   NOHEMI: 7/17/2024     Code Status: Full Code   Is the patient medically ready for discharge?:     Reason for patient still in hospital (select all that apply): Treatment  Discharge Plan A: Home with family   Discharge Delays: None known at this time              Jarad Dueñas MD  Department of Hospital Medicine   [facility] ([place]

## 2024-07-17 NOTE — TELEPHONE ENCOUNTER
----- Message from Karey Raymond sent at 7/17/2024  1:53 PM CDT -----  Needs advice from nurse:      Who Called: [facility]  Regarding:pt being discharged today and needs hospital f/u within 7 days with anyone in clinic, former pt of [facility]/no availability until mid August  Would the patient rather a call back or [facility]?  Best Call Back number: Call pt at [phone]  Additional Info:

## 2024-07-17 NOTE — PLAN OF CARE
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       Follow Up Appointments:  Future Appointments   Date Time Provider Department Center   7/26/2024  8:30 AM Keyur Hand NP [facility] ID [facility]   7/31/2024 11:00 AM Joann Ngo A.M., MD [facility]   9/6/2024 10:15 AM LAB, [place] [facility] [place]   9/10/2024  2:40 PM Ryder Qiu MD [facility] PULMON [place]   9/12/2024 11:00 AM Clemencia Boo MD [facility] MED [place]   9/19/2024  1:00 PM Ibeth Christy MD [facility] NEPHRO [facility]   9/24/2024  8:00 AM EKG, APPT [facility] EKG [facility]   9/24/2024  8:20 AM Taz Church MD [facility] ARRHYTH [facility]   9/26/2024  8:40 AM Ana Pinzon NP [facility] URO [place]   10/18/2024  2:20 PM Valerio Andino MD [facility] CARDIO [facility]       Allergies:  Review of patient's allergies indicates:   Allergen Reactions    Adhesive tape-silicones     Adhesive Rash     Pt states she removed a LATEX bandaid and the skin beneath was swollen and red. No other latex causes a reaction.       Medications: Review  discharge medications with patient and family and provide education.    Current Facility-Administered Medications   Medication Dose Route Frequency Provider Last Rate Last Admin    acetaminophen tablet 650 mg  650 mg Oral Q6H PRN Ming Beard MD   650 mg at 07/13/24 2242    albuterol inhaler 2 puff  2 puff Inhalation Q6H PRN Jarad Dueñas MD        apixaban tablet 2.5 mg  2.5 mg Oral BID Jarad Dueñas MD   2.5 mg at 07/17/24 2023    aspirin EC tablet 81 mg  81 mg Oral Daily Jarad Dueñas MD   81 mg at 07/17/24 0934    atorvastatin tablet 20 mg  20 mg Oral Daily Jarad Dueñas MD   20 mg at 07/17/24 0934    cefTRIAXone (ROCEPHIN) 2 g in D5W 100 mL IVPB (MB+)  2 g Intravenous Q24H Jarad Dueñas MD   Stopped at 07/17/24 1653    dextrose 10% bolus 125 mL 125 mL  12.5 g Intravenous PRN Jarad Dueñas MD        dextrose 10% bolus 250 mL 250 mL  25 g Intravenous PRN Jarad Dueñas MD        furosemide tablet 40 mg  40 mg Oral Daily Jarad Dueñas MD   40 mg at 07/17/24 0934    glucagon (human recombinant) injection 1 mg  1 mg Intramuscular PRN Jarad Dueñas MD        glucose chewable tablet 16 g  16 g Oral PRN Jarad Dueñas MD        glucose chewable tablet 24 g  24 g Oral PRN Jarad Dueñas MD        metoprolol succinate (TOPROL-XL) 24 hr tablet 100 mg  100 mg Oral Daily Jarad Dueñas MD   100 mg at 07/17/24 0934    naloxone 0.4 mg/mL injection 0.02 mg  0.02 mg Intravenous PRN Jarad Dueñas MD        sodium chloride 0.9% flush 10 mL  10 mL Intravenous Q12H PRN Jarad Dueñas MD        sodium chloride 0.9% flush 10 mL  10 mL Intravenous Q6H Jarad Dueñas MD   10 mL at 07/18/24 0600    And    sodium chloride 0.9% flush 10 mL  10 mL Intravenous PRN Jarad Dueñas MD        vitamin D 1000 units tablet 2,000 Units  2,000 Units Oral Daily Jarad Dueñas MD   2,000 Units at 07/17/24 0926     Current Discharge Medication List         START taking these medications    Details   D5W PgBk 100 mL with cefTRIAXone 2 gram SolR 2 g Inject 2 g into the vein once daily. for 9 days           CONTINUE these medications which have CHANGED    Details   furosemide (LASIX) 40 MG tablet Take 1 tablet (40 mg total) by mouth once daily.  Qty: 30 tablet, Refills: 11           CONTINUE these medications which have NOT CHANGED    Details   acetaminophen (TYLENOL) 500 MG tablet Take 2 tablets (1,000 mg total) by mouth every 8 (eight) hours as needed for Pain.  Qty: 42 tablet, Refills: 0      albuterol (PROVENTIL HFA) 90 mcg/actuation inhaler Inhale 2 puffs into the lungs every 6 (six) hours as needed for Wheezing. Rescue  Qty: 18 g, Refills: 3    Associated Diagnoses: Asthma-COPD overlap syndrome      apixaban (ELIQUIS) 2.5 mg Tab Take 1 tablet (2.5 mg total) by mouth 2 (two) times a day.  Qty: 60 tablet, Refills: 11      aspirin (ECOTRIN) 81 MG EC tablet Take 81 mg by mouth once daily.      biotin 1 mg tablet Take 1,000 mcg by mouth once daily.      budesonide-glycopyr-formoterol (BREZTRI AEROSPHERE) 160-9-4.8 mcg/actuation HFAA Inhale 2 puffs into the lungs 2 (two) times daily.  Qty: 32.1 g, Refills: 3    Associated Diagnoses: Asthma-COPD overlap syndrome      busPIRone (BUSPAR) 5 MG Tab Take 5 mg by mouth as needed.      fluticasone propionate (FLONASE) 50 mcg/actuation nasal spray 1 spray by Each Nostril route daily as needed (congestion).      inhalation spacing device Use as directed for inhalation.  Qty: 1 Device, Refills: 0      metoprolol succinate (TOPROL-XL) 100 MG 24 hr tablet Take 1 tablet (100 mg total) by mouth once daily.  Qty: 90 tablet, Refills: 3    Comments: .  Associated Diagnoses: Essential hypertension      vitamin D (VITAMIN D3) 1000 units Tab Take 2 tablets (2,000 Units total) by mouth once daily.  Qty: 180 tablet, Refills: 3    Associated Diagnoses: Vitamin D insufficiency           STOP taking these medications       cefadroxil  (DURICEF) 500 MG Cap Comments:   Reason for Stopping:         rosuvastatin (CRESTOR) 5 MG tablet Comments:   Reason for Stopping:         tamsulosin (FLOMAX) 0.4 mg Cap Comments:   Reason for Stopping:                 I have seen and examined this patient within the last 30 days. My clinical findings that support the need for the home health skilled services and home bound status are the following:no   Weakness/numbness causing balance and gait disturbance due to Weakness/Debility making it taxing to leave home.  Medical restrictions requiring assistance of another human to leave home due to  IV infusion Needs.     Diet:   cardiac diet    Labs:  Outpatient Weekly Labs:     Order the following labs to be drawn on Mondays:   CBC  CMP   CRP     Fax Lab Results to Infectious Diseases Provider: ESEQUIEL Singh FNP-C     [facility] Fax Number: [phone]       Referrals/ Consults  Physical Therapy to evaluate and treat. Evaluate for home safety and equipment needs; Establish/upgrade home exercise program. Perform / instruct on therapeutic exercises, gait training, transfer training, and Range of Motion.  Occupational Therapy to evaluate and treat. Evaluate home environment for safety and equipment needs. Perform/Instruct on transfers, ADL training, ROM, and therapeutic exercises.  Aide to provide assistance with personal care, ADLs, and vital signs.    Activities:   activity as tolerated    Nursing:   Agency to admit patient within 24 hours of hospital discharge unless specified on physician order or at patient request    SN to complete comprehensive assessment including routine vital signs. Instruct on disease process and s/s of complications to report to MD. Review/verify medication list sent home with the patient at time of discharge  and instruct patient/caregiver as needed. Frequency may be adjusted depending on start of care date.     Skilled nurse to perform up to 3 visits PRN for symptoms related to  diagnosis    Notify MD if SBP > 160 or < 90; DBP > 90 or < 50; HR > 120 or < 50; Temp > 101; O2 < 88%;     Ok to schedule additional visits based on staff availability and patient request on consecutive days within the home health episode.    When multiple disciplines ordered:    Start of Care occurs on Sunday - Wednesday schedule remaining discipline evaluations as ordered on separate consecutive days following the start of care.    Thursday SOC -schedule subsequent evaluations Friday and Monday the following week.     Friday - Saturday SOC - schedule subsequent discipline evaluations on consecutive days starting Monday of the following week.    For all post-discharge communication and subsequent orders please contact patient's primary care physician. If unable to reach primary care physician or do not receive response within 30 minutes, please contact [phone] for clinical staff order clarification    Miscellaneous     oxygen - 2L via nasal canula continuously      Routine Skin for Bedridden Patients: Instruct patient/caregiver to apply moisture barrier cream to all skin folds and wet areas in perineal area daily and after baths and all bowel movements.  Home Infusion Therapy:   SN to perform Infusion Therapy/Central Line Care.  Review Central Line Care & Central Line Flush with patient.    Administer (drug and dose):    Infection: pasturella bacteremia, beta lactamase negative.      Intravenous antibiotics:  Ceftriaxone 2g IV q24 hr       Therapy Duration:  2 weeks from cleared culture      Estimated end date of IV antibiotics: 7/26/24     Scrub the Hub: Prior to accessing the line, always perform a 30 second alcohol scrub  Each lumen of the central line is to be flushed at least daily with 10 mL Normal Saline and 3 mL Heparin flush (10 units/mL)  Skilled Nurse (SN) may draw blood from IV access  Blood Draw Procedure:   - Aspirate at least 5 mL of blood   - Discard   - Obtain specimen   - Change injection  cap   - Flush with 20 mL Normal Saline followed by a                 3-5 mL Heparin flush (10 units/mL)  Central :   - Sterile dressing changes are done weekly and as needed.   - Use chlor-hexadine scrub to cleanse site, apply Biopatch to insertion site,       apply securement device dressing   - Injection caps are changed weekly and after EVERY lab draw.   - If sterile gauze is under dressing to control oozing,                 dressing change must be performed every 24 hours until gauze is not needed.    Home Health Aide:  Nursing every 48 hours, Physical Therapy every 48 hours, Occupational Therapy every 48 hours, and Home Health Aide every 48 hours    Wound Care Orders  no    I certify that this patient is confined to her home and needs intermittent skilled nursing care, physical therapy, and occupational therapy.

## 2024-07-17 NOTE — PLAN OF CARE
CHW called to schedule pcp f/u, [place] () sent message to [facility] for nurse to call patient and schedule apt.

## 2024-07-17 NOTE — PROGRESS NOTES
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   Reports cat scratch to left wrist approximately two days prior to onset of symptoms.    Xray of LUE showed soft tissue swelling, no fracture, no foreign bodies, no effusion.   Venous US was negative for DVT.  Blood cultures of 7/11 now positive for Pasturella multocida.  Repeat cultures from 7/12 and 7/14 are negative.     Afebrile. HDS. Continues on unasyn alone. Per micro, pasteurella bacteremia sensitives should be back tomorrow (send out).     Plan/recommendations:  Will stop unasyn. Start ceftriaxone 2g IV q24 while awaiting micro sensitivities. Will anticipate 14d from cleared culture, EOC 7/26/24.   Continue elevation   Will follow up outpatinet. Okay to place line   Plan reviewed with ID staff. ID will sign off. See OPAT below:    Outpatient Antibiotic Therapy Plan:    Please send referral to [facility].    1) Infection: pasturella bacteremia, beta lactamase negative.     2) Discharge Antibiotics:    Intravenous antibiotics:  Ceftriaxone 2g IV q24 hr       3) Therapy Duration:  2 weeks from cleared culture     Estimated end date of IV antibiotics: 7/26/24    4) Outpatient Weekly Labs:    Order the following labs to be drawn on Mondays:   CBC  CMP   CRP    5) Fax Lab Results to Infectious Diseases Provider: ESEQUIEL Singh FNP-C    [facility] Fax Number: [phone]    6) Outpatient Infectious Diseases Follow-up    Follow-up appointment will be arranged by the  ID clinic and will be found in the patient's appointments tab.    Prior to discharge, please ensure the patient's follow-up has been scheduled.    If there is still no follow-up scheduled prior to discharge, please send an [place] [facility] message to [facility] [place] or Call Infectious Diseases Dept.                  Thank you for your consult. I will follow-up with patient. Please contact us if you have any additional questions.    Keyur Crystal NP  Infectious Disease  [facility] Surg ([facility]    Subjective:     Principal Problem:Cellulitis    HPI: Ms. Misa Barajas is an 78 year old with HTN, HFpEF, COPD on Home O2, pAF s/p ablation on 6/17/24, remote history of breast CA who presented to ED on 7/11 with left arem pain and swelling with rash from elbow to 5th finger. She was diagnosed with erysipelas, given cefadroxil, and discharged.  Called back to ED when a blood culture turned + for GNR.  LUE swelling reportedly started 7-9 days ago.  Developed rash, leaking clear fluid.  She did note she had a cat scratch on wrist a couple of days ago.  An Xray of LUE showed soft tissue swelling, no fracture, no foreign bodies, no effusion.   A venous US was negative.  Blood cultures of 7/11 with one bottle with GNR.  ID pending.  Rapid PCR negative.  Repeats of 7/12 are pending.  She is afebrile, WBC 15.  Currently on doxy and zosyn.  ID is consulted for GNR bacteremia  Interval History:   Blood cultures + for Pasturella mulocida - beta-lactamase negative. Awaiting sensitivities from micro.   Repeat blood cultures of 7/12, repeats negative.   Swelling, redness improved.     Review of Systems   Constitutional:  Negative for activity change, appetite change, chills, diaphoresis, fatigue and fever.   Cardiovascular:  Positive for leg swelling.   Musculoskeletal:  Positive for arthralgias. Negative for joint swelling.   Skin:  Positive for wound. Negative for rash.   Hematological:  Bruises/bleeds easily.   All other  systems reviewed and are negative.    Objective:     Vital Signs (Most Recent):  Temp: 97.4 °F (36.3 °C) (07/17/24 1112)  Pulse: 67 (07/17/24 1112)  Resp: 18 (07/17/24 1112)  BP: (!) 147/70 (07/17/24 1112)  SpO2: 96 % (07/17/24 1112) Vital Signs (24h Range):  Temp:  [97.4 °F (36.3 °C)-98.9 °F (37.2 °C)] 97.4 °F (36.3 °C)  Pulse:  [] 67  Resp:  [18-19] 18  SpO2:  [93 %-99 %] 96 %  BP: ()/(50-76) 147/70     Weight: 75.8 kg (167 lb)  Body mass index is 29.58 kg/m².    Estimated Creatinine Clearance: 31.3 mL/min (based on SCr of 1.4 mg/dL).     Physical Exam  Vitals and nursing note reviewed.   Constitutional:       General: She is not in acute distress.     Appearance: Normal appearance. She is not ill-appearing, toxic-appearing or diaphoretic.   HENT:      Head: Normocephalic and atraumatic.      Mouth/Throat:      Mouth: Mucous membranes are moist.   Eyes:      General: No scleral icterus.     Conjunctiva/sclera: Conjunctivae normal.   Cardiovascular:      Rate and Rhythm: Normal rate and regular rhythm.   Pulmonary:      Effort: Pulmonary effort is normal. No respiratory distress.   Abdominal:      General: There is no distension.      Palpations: Abdomen is soft.      Tenderness: There is no abdominal tenderness.   Musculoskeletal:      Cervical back: Normal range of motion.      Right lower leg: Edema present.      Left lower leg: Edema present.   Skin:     General: Skin is warm and dry.      Findings: No erythema or rash.      Comments: Continues with significant decrease in erythema and swelling of left arm/hand. No warmth, no TTP.      Neurological:      General: No focal deficit present.      Mental Status: She is alert and oriented to person, place, and time.   Psychiatric:         Mood and Affect: Mood normal.         Behavior: Behavior normal.                      Significant Labs: Blood Culture:   Recent Labs   Lab 05/09/24  1433 05/09/24  1434 07/11/24  1000 07/11/24  1046 07/14/24  1006    LABBLOO No growth after 5 days. No growth after 5 days. Gram stain rossi bottle: Gram negative rods  Results called to and read back by:Gaviota Viera RN 07/12/2024 09:14  PASTEURELLA MULTOCIDA  Beta Lactamase negative  * No growth after 5 days. No Growth to date  No Growth to date  No Growth to date  No Growth to date  No Growth to date  No Growth to date     CBC:   Recent Labs   Lab 07/16/24  0929 07/17/24  0338   WBC 5.80 6.74   HGB 10.5* 10.2*   HCT 33.8* 32.8*    191     CMP:   Recent Labs   Lab 07/16/24  0929 07/17/24  0338    142   K 3.4* 4.0    109   CO2 28 24    74   BUN 22 21   CREATININE 1.3 1.4   CALCIUM 9.2 9.2   PROT 5.1* 5.2*   ALBUMIN 2.0* 1.9*   BILITOT 0.6 0.5   ALKPHOS 82 86   AST 26 38   ALT 17 18   ANIONGAP 7* 9     Microbiology Results (last 7 days)       Procedure Component Value Units Date/Time    Blood culture [[id number]] Collected: 07/14/24 1006    Order Status: Completed Specimen: Blood Updated: 07/16/24 1612     Blood Culture, Routine No Growth to date      No Growth to date      No Growth to date    Narrative:      From 2 different sites 30 minutes apart  Rt hand    Blood culture [[id number]] Collected: 07/14/24 1006    Order Status: Completed Specimen: Blood Updated: 07/16/24 1612     Blood Culture, Routine No Growth to date      No Growth to date      No Growth to date    Narrative:      From 2 different sites 30 minutes apart  Rt AC    Urine culture [[id number]] Collected: 07/12/24 1933    Order Status: Completed Specimen: Urine Updated: 07/14/24 0545     Urine Culture, Routine No significant growth    Narrative:      Specimen Source->Urine    Blood culture #1 **CANNOT BE ORDERED STAT** [[id number]] Collected: 07/12/24 1517    Order Status: Sent Specimen: Blood from Peripheral, Forearm, Right     Blood culture #2 **CANNOT BE ORDERED STAT** [[id number]] Collected: 07/12/24 1516    Order Status: Sent Specimen: Blood from Peripheral, Antecubital,  Right             Significant Imaging: None

## 2024-07-17 NOTE — ASSESSMENT & PLAN NOTE
resolved   Recent Labs   Lab 07/15/24  0547 07/16/24  0929 07/17/24  0338   WBC 6.34 5.80 6.74       . Afebrile. BCX 2 likely secondary to sepsis..

## 2024-07-17 NOTE — PLAN OF CARE
11:07am SW attempted to meet w/ pt at bedside for check-in and discuss d/c. Pt was being assist by nurse. CM will attempt at a later time. CM dept will continue following along w/ treatment team for recommendation/needs.     Discharge Plan A and Plan B have been determined by review of patient's clinical status, future medical and therapeutic needs, and coverage/benefits for post-acute care in coordination with multidisciplinary team members.    SULEIAMN De La Cruz   [facility]    Case Management Dept  [phone]

## 2024-07-18 VITALS
HEART RATE: 65 BPM | BODY MASS INDEX: 29.59 KG/M2 | TEMPERATURE: 98 F | WEIGHT: 167 LBS | SYSTOLIC BLOOD PRESSURE: 134 MMHG | DIASTOLIC BLOOD PRESSURE: 64 MMHG | RESPIRATION RATE: 18 BRPM | HEIGHT: 63 IN | OXYGEN SATURATION: 95 %

## 2024-07-18 LAB
ALBUMIN SERPL BCP-MCNC: 1.9 G/DL (ref 3.5–5.2)
ALP SERPL-CCNC: 74 U/L (ref 55–135)
ALT SERPL W/O P-5'-P-CCNC: 16 U/L (ref 10–44)
ANION GAP SERPL CALC-SCNC: 7 MMOL/L (ref 8–16)
AST SERPL-CCNC: 25 U/L (ref 10–40)
BASOPHILS # BLD AUTO: 0.08 K/UL (ref 0–0.2)
BASOPHILS NFR BLD: 1.1 % (ref 0–1.9)
BILIRUB SERPL-MCNC: 0.4 MG/DL (ref 0.1–1)
BUN SERPL-MCNC: 21 MG/DL (ref 8–23)
CALCIUM SERPL-MCNC: 9.3 MG/DL (ref 8.7–10.5)
CHLORIDE SERPL-SCNC: 108 MMOL/L (ref 95–110)
CO2 SERPL-SCNC: 28 MMOL/L (ref 23–29)
CREAT SERPL-MCNC: 1.2 MG/DL (ref 0.5–1.4)
DIFFERENTIAL METHOD BLD: ABNORMAL
EOSINOPHIL # BLD AUTO: 0.3 K/UL (ref 0–0.5)
EOSINOPHIL NFR BLD: 4.2 % (ref 0–8)
ERYTHROCYTE [DISTWIDTH] IN BLOOD BY AUTOMATED COUNT: 13.2 % (ref 11.5–14.5)
EST. GFR  (NO RACE VARIABLE): 45.8 ML/MIN/1.73 M^2
GLUCOSE SERPL-MCNC: 82 MG/DL (ref 70–110)
HCT VFR BLD AUTO: 34.2 % (ref 37–48.5)
HGB BLD-MCNC: 10.3 G/DL (ref 12–16)
IMM GRANULOCYTES # BLD AUTO: 0.05 K/UL (ref 0–0.04)
IMM GRANULOCYTES NFR BLD AUTO: 0.7 % (ref 0–0.5)
LYMPHOCYTES # BLD AUTO: 1.8 K/UL (ref 1–4.8)
LYMPHOCYTES NFR BLD: 25.4 % (ref 18–48)
MAGNESIUM SERPL-MCNC: 1.9 MG/DL (ref 1.6–2.6)
MCH RBC QN AUTO: 28.2 PG (ref 27–31)
MCHC RBC AUTO-ENTMCNC: 30.1 G/DL (ref 32–36)
MCV RBC AUTO: 94 FL (ref 82–98)
MONOCYTES # BLD AUTO: 0.9 K/UL (ref 0.3–1)
MONOCYTES NFR BLD: 12.2 % (ref 4–15)
NEUTROPHILS # BLD AUTO: 4 K/UL (ref 1.8–7.7)
NEUTROPHILS NFR BLD: 56.4 % (ref 38–73)
NRBC BLD-RTO: 0 /100 WBC
PHOSPHATE SERPL-MCNC: 2.7 MG/DL (ref 2.7–4.5)
PLATELET # BLD AUTO: 199 K/UL (ref 150–450)
PMV BLD AUTO: 10 FL (ref 9.2–12.9)
POTASSIUM SERPL-SCNC: 3.4 MMOL/L (ref 3.5–5.1)
PROT SERPL-MCNC: 5 G/DL (ref 6–8.4)
RBC # BLD AUTO: 3.65 M/UL (ref 4–5.4)
SODIUM SERPL-SCNC: 143 MMOL/L (ref 136–145)
WBC # BLD AUTO: 7.12 K/UL (ref 3.9–12.7)

## 2024-07-18 PROCEDURE — A4216 STERILE WATER/SALINE, 10 ML: HCPCS | Performed by: HOSPITALIST

## 2024-07-18 PROCEDURE — 85025 COMPLETE CBC W/AUTO DIFF WBC: CPT | Performed by: HOSPITALIST

## 2024-07-18 PROCEDURE — 36415 COLL VENOUS BLD VENIPUNCTURE: CPT | Performed by: HOSPITALIST

## 2024-07-18 PROCEDURE — 80053 COMPREHEN METABOLIC PANEL: CPT | Performed by: HOSPITALIST

## 2024-07-18 PROCEDURE — 63600175 PHARM REV CODE 636 W HCPCS: Performed by: HOSPITALIST

## 2024-07-18 PROCEDURE — 25000003 PHARM REV CODE 250: Performed by: HOSPITALIST

## 2024-07-18 PROCEDURE — 83735 ASSAY OF MAGNESIUM: CPT | Performed by: HOSPITALIST

## 2024-07-18 PROCEDURE — 25000003 PHARM REV CODE 250: Performed by: STUDENT IN AN ORGANIZED HEALTH CARE EDUCATION/TRAINING PROGRAM

## 2024-07-18 PROCEDURE — 84100 ASSAY OF PHOSPHORUS: CPT | Performed by: HOSPITALIST

## 2024-07-18 RX ORDER — POTASSIUM CHLORIDE 20 MEQ/1
40 TABLET, EXTENDED RELEASE ORAL EVERY 4 HOURS
Status: DISCONTINUED | OUTPATIENT
Start: 2024-07-18 | End: 2024-07-18

## 2024-07-18 RX ADMIN — FUROSEMIDE 40 MG: 40 TABLET ORAL at 09:07

## 2024-07-18 RX ADMIN — Medication 10 ML: at 06:07

## 2024-07-18 RX ADMIN — CHOLECALCIFEROL TAB 25 MCG (1000 UNIT) 2000 UNITS: 25 TAB at 09:07

## 2024-07-18 RX ADMIN — POTASSIUM BICARBONATE 50 MEQ: 978 TABLET, EFFERVESCENT ORAL at 12:07

## 2024-07-18 RX ADMIN — APIXABAN 2.5 MG: 2.5 TABLET, FILM COATED ORAL at 09:07

## 2024-07-18 RX ADMIN — METOPROLOL SUCCINATE 100 MG: 50 TABLET, EXTENDED RELEASE ORAL at 09:07

## 2024-07-18 RX ADMIN — ATORVASTATIN CALCIUM 20 MG: 20 TABLET, FILM COATED ORAL at 09:07

## 2024-07-18 RX ADMIN — ASPIRIN 81 MG: 81 TABLET, COATED ORAL at 09:07

## 2024-07-18 RX ADMIN — POTASSIUM CHLORIDE 40 MEQ: 1500 TABLET, EXTENDED RELEASE ORAL at 09:07

## 2024-07-18 RX ADMIN — CEFTRIAXONE 2 G: 2 INJECTION, POWDER, FOR SOLUTION INTRAMUSCULAR; INTRAVENOUS at 03:07

## 2024-07-18 RX ADMIN — Medication 10 ML: at 10:07

## 2024-07-18 RX ADMIN — Medication 10 ML: at 12:07

## 2024-07-18 NOTE — PROGRESS NOTES
[facility] Home IV ATB Education/Discharge Planning Note:     [facility] educator met with the patient and her daughters (who are her primary caregivers) and discussed discharge plan for home IV ATB. Misa Barajas will discharge home with family support but will not self infuse. Patient's IV medication will infuse via Elastomeric Pump. The patient & her daughters were educated on S.A.S.H procedure & [facility] S.A.S.H mat provided. Patient education checklist and [facility] consent to treatment form reviewed and acknowledged by the patient and her daughters and they are agreeable to discharge with home infusion plan of care. IV administration process using aspetic technique was reviewed with successful return demonstration by the patient's daughters. The patient & her daughters feel comfortable with home infusion process after bedside education provided. It is to be noted that the patient's daughters have previously assisted the patient with IV ATB in the home setting, so the purposes of the bedside education was for reinforcement/clarification of details purposes only. The patient will discharge home on IV Ceftriaxone 2g Q24 hours for estimated end of therapy date of 7/26/2024. Dosing schedule time will be 15:00 daily, beginning with first home dose due on 7/19/2024; I confirmed that the patient received a dose prior to discharge on 7/18/2024. Extension set not placed on SL midline, as patient will not be self infusing.  Patient accepted to care by [facility] for weekly dressing changes and lab draws.     Time allotted for questions; questions addressed appropriately. Patients home IV ATB & supplies have been delivered to the bedside. Provided patient with [facility] support number [phone] & [facility] number [phone]. Patient is ready for discharge home from [facility] perspective. Patient's discharge planning team and bedside nurse updated with the  information above.      -Patient has been accepted to care by [facility] and report called to Seema. She confirmed patient has been accepted onto services by the agency.  -Phone number [phone]     Please do not hesitate to reach out for any additional needs.    Jc Marks MS, RDN, LDN  Clinical Liaison & Dietitian  [facility]   Phone: [phone]  [email or web address]          As a note, fall risk precautions & preventions discussed with patient.

## 2024-07-18 NOTE — NURSING
Discharge instructions given to patient/daughters at bedside, verbalized understanding. Midline to RUE to stay in place for home abx infusions dressing noted to be clean dry and intact,  and dated 7/17/24. Education provided at bedside. Awaiting w/c for discharge.

## 2024-07-18 NOTE — ASSESSMENT & PLAN NOTE
Creatine stable for now. BMP reviewed- noted Estimated Creatinine Clearance: 36.5 mL/min (based on SCr of 1.2 mg/dL). according to latest data. Monitor UOP and serial BMP and adjust therapy as needed. Renally dose meds.    Recent Labs   Lab 07/16/24  0929 07/17/24  0338 07/18/24  0508   BUN 22 21 21   CREATININE 1.3 1.4 1.2         I & O     Intake/Output Summary (Last 24 hours) at 7/18/2024 0813  Last data filed at 7/18/2024 0803  Gross per 24 hour   Intake 490 ml   Output --   Net 490 ml      7/14 Cr 1.2-->1.6. hold lasix for now

## 2024-07-18 NOTE — PLAN OF CARE
Problem: Adult Inpatient Plan of Care  Goal: Optimal Comfort and Wellbeing  Outcome: Progressing     Problem: Skin or Soft Tissue Infection  Goal: Absence of Infection Signs and Symptoms  Outcome: Progressing     Problem: Infection  Goal: Absence of Infection Signs and Symptoms  Outcome: Progressing

## 2024-07-18 NOTE — ASSESSMENT & PLAN NOTE
Chronic, controlled. Latest blood pressure and vitals reviewed-     Temp:  [97.4 °F (36.3 °C)-98.6 °F (37 °C)]   Pulse:  [64-80]   Resp:  [18]   BP: (128-153)/(56-73)   SpO2:  [94 %-98 %] .   Home meds for hypertension were reviewed and noted below.   Hypertension Medications               furosemide (LASIX) 40 MG tablet Take 1 tablet (40 mg total) by mouth 2 (two) times daily.    metoprolol succinate (TOPROL-XL) 100 MG 24 hr tablet Take 1 tablet (100 mg total) by mouth once daily.            While in the hospital, will manage blood pressure as follows; Continue home antihypertensive regimen    Will utilize p.r.n. blood pressure medication only if patient's blood pressure greater than 180/110 and she develops symptoms such as worsening chest pain or shortness of breath.

## 2024-07-18 NOTE — ASSESSMENT & PLAN NOTE
Patient has hypokalemia which is Acute and currently stable. Most recent potassium levels reviewed-   Lab Results   Component Value Date    K 3.4 (L) 07/18/2024   . Will continue potassium replacement per protocol and recheck repeat levels after replacement completed.

## 2024-07-18 NOTE — ASSESSMENT & PLAN NOTE
Patient is identified as having Diastolic (HFpEF) heart failure that is Chronic. CHF is currently controlled. Latest ECHO performed and demonstrates- Results for orders placed during the hospital encounter of 05/07/24    Echo    Interpretation Summary    Left Ventricle: The left ventricle is normal in size. Ventricular mass is normal. Normal wall thickness. Normal wall motion. There is normal systolic function. Ejection fraction by visual approximation is 55%. Unable to assess diastolic function due to atrial fibrillation.    Right Ventricle: Normal right ventricular cavity size. Wall thickness is normal. Systolic function is mildly reduced.    Aortic Valve: The aortic valve is a trileaflet valve. There is moderate aortic valve sclerosis. There is annular calcification present. Mildly restricted motion. No stenosis.    Tricuspid Valve: There is mild to moderate regurgitation.    Pulmonary Artery: The estimated pulmonary artery systolic pressure is 44 mmHg.    IVC/SVC: Elevated venous pressure at 15 mmHg.

## 2024-07-18 NOTE — ASSESSMENT & PLAN NOTE
Patient's with Normocytic anemia.. Hemoglobin stable. Etiology likely due to chronic disease .  Current CBC reviewed-    Recent Labs   Lab 07/16/24  0929 07/17/24  0338 07/18/24  0508   HGB 10.5* 10.2* 10.3*           Component Value Date/Time    MCV 94 07/18/2024 0508    RDW 13.2 07/18/2024 0508    IRON 115 06/23/2016 0947    FERRITIN 98 06/23/2016 0947     Monitor CBC and transfuse if H/H drops below 7/21.

## 2024-07-18 NOTE — DISCHARGE SUMMARY
[facility] ([place] [address])  [facility]  Discharge Summary      Patient Name: Misa Barajas  MRN: [id number]  ARTHUR: [id number]  Patient Class: IP- Inpatient  Admission Date: 7/12/2024  Hospital Length of Stay: 6 days  Discharge Date and Time: 7/18/2024  5:15 PM  Attending Physician: Suha Clark MD   Discharging Provider: Suha Clark MD  Primary Care Provider: Celia Carlisle MD  [place] [facility] Suha Clark MD  [place] Care Team[place] [facility]    HPI:   Misa Barajas is a 78 y.o. female with a PMHx of HTN, HLD, HFpEF, COPD on home O2 (2L NC baseline O2 sat 92%) and pAF presents with  left sided painful erythematous rash which extends from her medial elbow to her fifth digit.     AAOX3. accompanied by daughters at the bedside. patient was seen at the ER on 7/12/24 and diagnosed with Erysipelas and discharged on cefadroxil outpatient. She e was called back after GNR's were seen on her 1/2 blood cultuers.  c/o  small bout of diarrhea . denies new symptoms. reports LUE  swelling started 7-9 days ago when she noticed her arm was swelling and leaking clear fluid - associated with elbow was red and tender.  reports that her cat scratched her near left wrist a couple of days ago. c/o  subjective fever.       ER course - X ray left elbow -diffuse soft tissue swelling identified. Visualized osseous structures appear unremarkable, with no evidence of recent or healing fracture, lytic destructive process, or appreciable arthritic change noted. No elbow joint effusion is demonstrated. No radiopaque soft tissue foreign body or abnormal gas collection within the soft tissues is appreciated. US LUE -No thrombus in central veins of the left upper extremity.  Repeat BCX 2 . started on IV Zosyn     last admission 6/27 - had successful RFA of the CTI for treatment of CTI-dependent atrial flutter. No evidence of intra- or post-procedure complications. Post-ablation ECG shows  NSR, and no acute abnormalities.       * No surgery found *      Hospital Course:   Patient admitted for cellulitis and pasturella bacteremia. BLE DVT ultrasounds negative. Cellulitis symptoms improved with antibiotic treatment. Patient to complete 2 weeks of IV antibiotics from date of culture clearance - 7/14. EOT 7/26. Patient to follow-up in ID clinic and with PCP.     Patient seen and examined on day of discharge and deemed appropriate for discharge. Plan discussed with patient, who was agreeable and amenable. Medications were discussed and reviewed, outpatient follow-up scheduled, ER precautions were given, all questions were answered to the patient's satisfaction, and patient was subsequently discharged.        Outpatient Antibiotic Therapy Plan:     Please send referral to [facility].     1) Infection: pasturella bacteremia, beta lactamase negative.      2) Discharge Antibiotics:     Intravenous antibiotics:  Ceftriaxone 2g IV q24 hr         3) Therapy Duration:  2 weeks from cleared culture      Estimated end date of IV antibiotics: 7/26/24     4) Outpatient Weekly Labs:     Order the following labs to be drawn on Mondays:   CBC  CMP   CRP     5) Fax Lab Results to Infectious Diseases Provider: ESEQUIEL Singh FNP-C     [facility] Fax Number: [phone]     6) Outpatient Infectious Diseases Follow-up     Follow-up appointment will be arranged by the ID clinic and will be found in the patient's appointments tab.     Prior to discharge, please ensure the patient's follow-up has been scheduled.    If there is still no follow-up scheduled prior to discharge, please send an [employer] message to [facility] Clinical Pool or Call Infectious Diseases Dept.    Goals of Care Treatment Preferences:  Code Status: Full Code      Consults:   Consults (From admission, onward)          Status Ordering Provider     Inpatient consult to Midline team  Once        Provider:  (Not yet assigned)     Completed AUGUSTINE BOLAND     Inpatient consult to Registered Dietitian/Nutritionist  Once        Provider:  (Not yet assigned)    Completed AUGUSTINE BOLAND     Inpatient consult to Infectious Diseases  Once        Provider:  (Not yet assigned)    AUGUSTINE Westfall            Physical Exam  Vitals and nursing note reviewed.   Constitutional:       General: She is not in acute distress.     Appearance: Normal appearance. She is not ill-appearing, toxic-appearing or diaphoretic.   HENT:      Head: Normocephalic and atraumatic.      Mouth/Throat:      Mouth: Mucous membranes are moist.   Eyes:      General: No scleral icterus.     Conjunctiva/sclera: Conjunctivae normal.   Cardiovascular:      Rate and Rhythm: Normal rate and regular rhythm.   Pulmonary:      Effort: Pulmonary effort is normal. No respiratory distress.   Abdominal:      General: There is no distension.      Palpations: Abdomen is soft.      Tenderness: There is no abdominal tenderness.   Musculoskeletal:      Cervical back: Normal range of motion.      Right lower leg: Edema present.      Left lower leg: Edema present.   Skin:     General: Skin is warm and dry.      Findings: Erythema and swelling nearly resolved.     Neurological:      General: No focal deficit present.      Mental Status: She is alert and oriented to person, place, and time.   Psychiatric:         Mood and Affect: Mood normal.         Behavior: Behavior normal.     Pulmonary  Chronic respiratory failure with hypoxia  HFpEF, COPD on home O2  on 2L NC baseline     Cardiac/Vascular  Diastolic CHF, chronic  Patient is identified as having Diastolic (HFpEF) heart failure that is Chronic. CHF is currently controlled. Latest ECHO performed and demonstrates- Results for orders placed during the hospital encounter of 05/07/24    Echo    Interpretation Summary    Left Ventricle: The left ventricle is normal in size. Ventricular mass is normal. Normal wall thickness. Normal  wall motion. There is normal systolic function. Ejection fraction by visual approximation is 55%. Unable to assess diastolic function due to atrial fibrillation.    Right Ventricle: Normal right ventricular cavity size. Wall thickness is normal. Systolic function is mildly reduced.    Aortic Valve: The aortic valve is a trileaflet valve. There is moderate aortic valve sclerosis. There is annular calcification present. Mildly restricted motion. No stenosis.    Tricuspid Valve: There is mild to moderate regurgitation.    Pulmonary Artery: The estimated pulmonary artery systolic pressure is 44 mmHg.    IVC/SVC: Elevated venous pressure at 15 mmHg.    Hyperlipidemia  continue statin       Paroxysmal atrial fibrillation  continue metoprolol and eliquis. last admission 6/27 - had successful RFA of the CTI for treatment of CTI-dependent atrial flutter. No evidence of intra- or post-procedure complications. Post-ablation ECG shows NSR, and no acute abnormalities.     Essential hypertension  Chronic, controlled. Latest blood pressure and vitals reviewed-     Temp:  [97.4 °F (36.3 °C)-98.6 °F (37 °C)]   Pulse:  [64-80]   Resp:  [18]   BP: (128-153)/(56-73)   SpO2:  [94 %-98 %] .   Home meds for hypertension were reviewed and noted below.   Hypertension Medications               furosemide (LASIX) 40 MG tablet Take 1 tablet (40 mg total) by mouth 2 (two) times daily.    metoprolol succinate (TOPROL-XL) 100 MG 24 hr tablet Take 1 tablet (100 mg total) by mouth once daily.            While in the hospital, will manage blood pressure as follows; Continue home antihypertensive regimen    Will utilize p.r.n. blood pressure medication only if patient's blood pressure greater than 180/110 and she develops symptoms such as worsening chest pain or shortness of breath.    Renal/  Hypokalemia  Patient has hypokalemia which is Acute and currently stable. Most recent potassium levels reviewed-   Lab Results   Component Value Date    K  3.4 (L) 07/18/2024   . Will continue potassium replacement per protocol and recheck repeat levels after replacement completed.     CKD (chronic kidney disease) stage 3, GFR 30-59 ml/min  Creatine stable for now. BMP reviewed- noted Estimated Creatinine Clearance: 36.5 mL/min (based on SCr of 1.2 mg/dL). according to latest data. Monitor UOP and serial BMP and adjust therapy as needed. Renally dose meds.    Recent Labs   Lab 07/16/24  0929 07/17/24  0338 07/18/24  0508   BUN 22 21 21   CREATININE 1.3 1.4 1.2         I & O     Intake/Output Summary (Last 24 hours) at 7/18/2024 0813  Last data filed at 7/18/2024 0803  Gross per 24 hour   Intake 490 ml   Output --   Net 490 ml      7/14 Cr 1.2-->1.6. hold lasix for now    ID  * Cellulitis  presents with  left sided painful erythematous rash which extends from her medial elbow to her fifth digit.  was seen at the ER on 7/12/24 and diagnosed with Erysipelas and discharged on antibiotics outpatient. She e was called back after GNR's were seen on her 1/2 blood cultuers     ER course - X ray left elbow -diffuse soft tissue swelling identified. Visualized osseous structures appear unremarkable, with no evidence of recent or healing fracture, lytic destructive process, or appreciable arthritic change noted. No elbow joint effusion is demonstrated. No radiopaque soft tissue foreign body or abnormal gas collection within the soft tissues is appreciated. US LUE -No thrombus in central veins of the left upper extremity.  Repeat BCX 2 . started on IV Zosyn     ID evaluation . switched to unasyn. continue doxycycline     Gram-negative bacteremia - Pasturella multocida  GNR's were seen on her 1/2 blood cultures on 7/11. repeat BC x2   7/14 BC 7/11 with Pasteurella multocida . BCX 2 7/12 lost. repeat BCX 2 today .  7/17 BCx2 7/14. NGTD.  Unasyn discontinued. ID recs ceftriaxone 2g IV q24 while awaiting micro sensitivities.anticipate 14d from cleared culture( end date 7/26/24). ID to  follow up cultures.   discharge with home health     Hematology  Chronic anticoagulation  continue eliquis       Oncology  Leucocytosis  resolved   Recent Labs   Lab 07/15/24  0547 07/16/24  0929 07/17/24  0338   WBC 6.34 5.80 6.74       . Afebrile. BCX 2 likely secondary to sepsis..      Anemia    Patient's with Normocytic anemia.. Hemoglobin stable. Etiology likely due to chronic disease .  Current CBC reviewed-    Recent Labs   Lab 07/16/24  0929 07/17/24  0338 07/18/24  0508   HGB 10.5* 10.2* 10.3*           Component Value Date/Time    MCV 94 07/18/2024 0508    RDW 13.2 07/18/2024 0508    IRON 115 06/23/2016 0947    FERRITIN 98 06/23/2016 0947     Monitor CBC and transfuse if H/H drops below 7/21.        Final Active Diagnoses:    Diagnosis Date Noted POA    PRINCIPAL PROBLEM:  Cellulitis [L03.90] 07/12/2024 Yes    Gram-negative bacteremia - Pasturella multocida [R78.81] 07/12/2024 No    Anemia [D64.9] 07/12/2024 Unknown    Leucocytosis [D72.829] 07/12/2024 No    Diastolic CHF, chronic [I50.32] 05/07/2024 Yes    Hypokalemia [E87.6] 03/02/2024 Yes    Chronic anticoagulation [Z79.01] 05/04/2022 Not Applicable    Chronic respiratory failure with hypoxia [J96.11] 06/26/2021 Yes    Paroxysmal atrial fibrillation [I48.0] 06/07/2021 Yes    CKD (chronic kidney disease) stage 3, GFR 30-59 ml/min [N18.30] 02/14/2018 Yes    Hyperlipidemia [E78.5] 11/20/2016 Yes    Essential hypertension [I10] 05/28/2015 Yes      Problems Resolved During this Admission:       Discharged Condition: stable    Disposition: Home-Health Care [facility]    Follow Up:    Patient Instructions:   No discharge procedures on file.    Significant Diagnostic Studies: Labs: BMP:   Recent Labs   Lab 07/18/24  0508   GLU 82      K 3.4*      CO2 28   BUN 21   CREATININE 1.2   CALCIUM 9.3   MG 1.9       Pending Diagnostic Studies:       None           Medications:  Reconciled Home Medications:      Medication List        START taking these medications       D5W PgBk 100 mL with cefTRIAXone 2 gram SolR 2 g  Inject 2 g into the vein once daily. for 9 days            CHANGE how you take these medications      furosemide 40 MG tablet  Commonly known as: LASIX  Take 1 tablet (40 mg total) by mouth once daily.  What changed: when to take this            CONTINUE taking these medications      acetaminophen 500 MG tablet  Commonly known as: TYLENOL  Take 2 tablets (1,000 mg total) by mouth every 8 (eight) hours as needed for Pain.     albuterol 90 mcg/actuation inhaler  Commonly known as: PROVENTIL HFA  Inhale 2 puffs into the lungs every 6 (six) hours as needed for Wheezing. Rescue     aspirin 81 MG EC tablet  Commonly known as: ECOTRIN  Take 81 mg by mouth once daily.     biotin 1 mg tablet  Take 1,000 mcg by mouth once daily.     BREZTRI AEROSPHERE 160-9-4.8 mcg/actuation Hfaa  Generic drug: budesonide-glycopyr-formoterol  Inhale 2 puffs into the lungs 2 (two) times daily.     busPIRone 5 MG Tab  Commonly known as: BUSPAR  Take 5 mg by mouth as needed.     ELIQUIS 2.5 mg Tab  Generic drug: apixaban  Take 1 tablet (2.5 mg total) by mouth 2 (two) times a day.     fluticasone propionate 50 mcg/actuation nasal spray  Commonly known as: FLONASE  1 spray by Each Nostril route daily as needed (congestion).     inhalation spacing device  Use as directed for inhalation.     metoprolol succinate 100 MG 24 hr tablet  Commonly known as: TOPROL-XL  Take 1 tablet (100 mg total) by mouth once daily.     vitamin D 1000 units Tab  Commonly known as: VITAMIN D3  Take 2 tablets (2,000 Units total) by mouth once daily.            STOP taking these medications      cefadroxil 500 MG Cap  Commonly known as: DURICEF     rosuvastatin 5 MG tablet  Commonly known as: CRESTOR     tamsulosin 0.4 mg Cap  Commonly known as: FLOMAX              Indwelling Lines/Drains at time of discharge:   Lines/Drains/Airways       None                   Time spent on the discharge of patient: 35 minutes          Suha Clark MD  Department of Hospital Medicine  [facility] - Med Surg ([facility]

## 2024-07-18 NOTE — PLAN OF CARE
CM sent referrals to [facility] and [facility] via CP for this pt who will be d/c'd on IV abx.  Per CP, both [facility] and [facility] are willing to accept pt.    CM spoke with pt in room.  She states her daughter Marina will give her a ride home.  Dispo home with [facility].  Pt states she is supposed to get instruction on IV infusion at 4, so will be going home after 4 PM.    AMARJIT Whitehead, BS, RN, [facility]

## 2024-07-19 ENCOUNTER — PATIENT OUTREACH (OUTPATIENT)
Dept: ADMINISTRATIVE | Facility: CLINIC | Age: 79
End: 2024-07-19
Payer: MEDICARE

## 2024-07-19 LAB
BACTERIA BLD CULT: NORMAL
BACTERIA BLD CULT: NORMAL

## 2024-07-19 NOTE — PLAN OF CARE
[facility] ([facility])  Discharge Final Note    Primary Care Provider: Celia Carlisle MD    Expected Discharge Date: 7/18/2024    Final Discharge Note (most recent)       Final Note - 07/19/24 0820          Final Note    Assessment Type Final Discharge Note (P)      Anticipated Discharge Disposition Home-Health Care Svc (P)         Post-Acute Status    Post-Acute Authorization Home Health;IV Infusion (P)      Home Health Status Set-up Complete/Auth obtained (P)      Coverage [facility] (P)      IV Infusion Status Set-up Complete/Auth obtained (P)      Discharge Delays None known at this time (P)                      Important Message from Medicare    Pt d/c'd with [facility] and [facility].    Buffy Grant, BEREKETN, BS, RN, CCM

## 2024-07-19 NOTE — PROGRESS NOTES
C3 nurse spoke with Misa Barajas for a TCC post hospital discharge follow up call. The patient has a scheduled [facility] appointment with Joann Ngo A.M., MD on 7/31/2024 at 11 AM.

## 2024-07-20 LAB
BACTERIA BLD CULT: ABNORMAL

## 2024-07-22 ENCOUNTER — LAB VISIT (OUTPATIENT)
Dept: LAB | Facility: HOSPITAL | Age: 79
End: 2024-07-22
Attending: REGISTERED NURSE
Payer: MEDICARE

## 2024-07-22 DIAGNOSIS — L03.90 CELLULITIS OF SKIN WITH LYMPHANGITIS: Primary | ICD-10-CM

## 2024-07-22 LAB
ALBUMIN SERPL BCP-MCNC: 2.3 G/DL (ref 3.5–5.2)
ALP SERPL-CCNC: 94 U/L (ref 55–135)
ALT SERPL W/O P-5'-P-CCNC: 20 U/L (ref 10–44)
ANION GAP SERPL CALC-SCNC: 8 MMOL/L (ref 8–16)
AST SERPL-CCNC: 35 U/L (ref 10–40)
BASOPHILS # BLD AUTO: 0.1 K/UL (ref 0–0.2)
BASOPHILS NFR BLD: 1.1 % (ref 0–1.9)
BILIRUB SERPL-MCNC: 0.4 MG/DL (ref 0.1–1)
BUN SERPL-MCNC: 17 MG/DL (ref 8–23)
CALCIUM SERPL-MCNC: 10.2 MG/DL (ref 8.7–10.5)
CHLORIDE SERPL-SCNC: 103 MMOL/L (ref 95–110)
CO2 SERPL-SCNC: 27 MMOL/L (ref 23–29)
CREAT SERPL-MCNC: 1.4 MG/DL (ref 0.5–1.4)
CRP SERPL-MCNC: 4.1 MG/L (ref 0–8.2)
DIFFERENTIAL METHOD BLD: ABNORMAL
EOSINOPHIL # BLD AUTO: 0.3 K/UL (ref 0–0.5)
EOSINOPHIL NFR BLD: 3.3 % (ref 0–8)
ERYTHROCYTE [DISTWIDTH] IN BLOOD BY AUTOMATED COUNT: 13.6 % (ref 11.5–14.5)
EST. GFR  (NO RACE VARIABLE): 38 ML/MIN/1.73 M^2
GLUCOSE SERPL-MCNC: 92 MG/DL (ref 70–110)
HCT VFR BLD AUTO: 31.4 % (ref 37–48.5)
HGB BLD-MCNC: 10.2 G/DL (ref 12–16)
IMM GRANULOCYTES # BLD AUTO: 0.07 K/UL (ref 0–0.04)
IMM GRANULOCYTES NFR BLD AUTO: 0.8 % (ref 0–0.5)
LYMPHOCYTES # BLD AUTO: 1.8 K/UL (ref 1–4.8)
LYMPHOCYTES NFR BLD: 19.9 % (ref 18–48)
MCH RBC QN AUTO: 28.7 PG (ref 27–31)
MCHC RBC AUTO-ENTMCNC: 32.5 G/DL (ref 32–36)
MCV RBC AUTO: 89 FL (ref 82–98)
MONOCYTES # BLD AUTO: 1 K/UL (ref 0.3–1)
MONOCYTES NFR BLD: 10.6 % (ref 4–15)
NEUTROPHILS # BLD AUTO: 5.9 K/UL (ref 1.8–7.7)
NEUTROPHILS NFR BLD: 64.3 % (ref 38–73)
NRBC BLD-RTO: 0 /100 WBC
PLATELET # BLD AUTO: 260 K/UL (ref 150–450)
PMV BLD AUTO: 10 FL (ref 9.2–12.9)
POTASSIUM SERPL-SCNC: 3.4 MMOL/L (ref 3.5–5.1)
PROT SERPL-MCNC: 5.9 G/DL (ref 6–8.4)
RBC # BLD AUTO: 3.55 M/UL (ref 4–5.4)
SODIUM SERPL-SCNC: 138 MMOL/L (ref 136–145)
WBC # BLD AUTO: 9.19 K/UL (ref 3.9–12.7)

## 2024-07-22 PROCEDURE — 80053 COMPREHEN METABOLIC PANEL: CPT | Performed by: REGISTERED NURSE

## 2024-07-22 PROCEDURE — 85025 COMPLETE CBC W/AUTO DIFF WBC: CPT | Performed by: REGISTERED NURSE

## 2024-07-22 PROCEDURE — 86140 C-REACTIVE PROTEIN: CPT | Performed by: REGISTERED NURSE

## 2024-07-26 ENCOUNTER — INFUSION (OUTPATIENT)
Dept: INFECTIOUS DISEASES | Facility: HOSPITAL | Age: 79
End: 2024-07-26
Attending: INTERNAL MEDICINE
Payer: MEDICARE

## 2024-07-26 ENCOUNTER — OFFICE VISIT (OUTPATIENT)
Dept: INFECTIOUS DISEASES | Facility: CLINIC | Age: 79
End: 2024-07-26
Payer: MEDICARE

## 2024-07-26 ENCOUNTER — HOSPITAL ENCOUNTER (INPATIENT)
Facility: HOSPITAL | Age: 79
LOS: 16 days | Discharge: NURSING, REHAB OR LONG-TERM CARE | DRG: 439 | End: 2024-08-12
Attending: EMERGENCY MEDICINE | Admitting: EMERGENCY MEDICINE
Payer: MEDICARE

## 2024-07-26 VITALS
BODY MASS INDEX: 29.38 KG/M2 | HEART RATE: 73 BPM | TEMPERATURE: 98 F | DIASTOLIC BLOOD PRESSURE: 68 MMHG | HEIGHT: 63 IN | WEIGHT: 165.81 LBS | SYSTOLIC BLOOD PRESSURE: 128 MMHG

## 2024-07-26 VITALS
OXYGEN SATURATION: 97 % | HEIGHT: 63 IN | BODY MASS INDEX: 30.09 KG/M2 | WEIGHT: 169.81 LBS | HEART RATE: 65 BPM | RESPIRATION RATE: 19 BRPM | TEMPERATURE: 98 F | SYSTOLIC BLOOD PRESSURE: 175 MMHG | DIASTOLIC BLOOD PRESSURE: 72 MMHG

## 2024-07-26 DIAGNOSIS — R07.9 CHEST PAIN: ICD-10-CM

## 2024-07-26 DIAGNOSIS — S41.152D: ICD-10-CM

## 2024-07-26 DIAGNOSIS — K80.21 CHOLELITHIASIS WITH BILIARY OBSTRUCTION: ICD-10-CM

## 2024-07-26 DIAGNOSIS — L03.114 CELLULITIS OF LEFT UPPER EXTREMITY: Primary | ICD-10-CM

## 2024-07-26 DIAGNOSIS — M54.9 BACK PAIN: ICD-10-CM

## 2024-07-26 DIAGNOSIS — R00.1 BRADYCARDIA: ICD-10-CM

## 2024-07-26 DIAGNOSIS — K85.10 ACUTE BILIARY PANCREATITIS WITHOUT INFECTION OR NECROSIS: ICD-10-CM

## 2024-07-26 DIAGNOSIS — K85.90 ACUTE PANCREATITIS, UNSPECIFIED COMPLICATION STATUS, UNSPECIFIED PANCREATITIS TYPE: Primary | ICD-10-CM

## 2024-07-26 DIAGNOSIS — R78.81 BACTEREMIA: ICD-10-CM
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
## 2024-07-26 DIAGNOSIS — M79.89 LEFT ARM SWELLING: ICD-10-CM

## 2024-07-26 PROBLEM — N18.30 CKD (CHRONIC KIDNEY DISEASE) STAGE 3, GFR 30-59 ML/MIN: Chronic | Status: ACTIVE | Noted: 2018-02-14

## 2024-07-26 PROBLEM — I45.10 RBBB: Chronic | Status: ACTIVE | Noted: 2017-05-17

## 2024-07-26 PROBLEM — Z79.01 CHRONIC ANTICOAGULATION: Chronic | Status: ACTIVE | Noted: 2022-05-04

## 2024-07-26 LAB
BUN SERPL-MCNC: 17 MG/DL (ref 6–30)
CHLORIDE SERPL-SCNC: 100 MMOL/L (ref 95–110)
CREAT SERPL-MCNC: 1.5 MG/DL (ref 0.5–1.4)
GLUCOSE SERPL-MCNC: 150 MG/DL (ref 70–110)
HCT VFR BLD CALC: 33 %PCV (ref 36–54)
POC IONIZED CALCIUM: 1.26 MMOL/L (ref 1.06–1.42)
POC TCO2 (MEASURED): 27 MMOL/L (ref 23–29)
POTASSIUM BLD-SCNC: 3.2 MMOL/L (ref 3.5–5.1)
SAMPLE: ABNORMAL
SODIUM BLD-SCNC: 139 MMOL/L (ref 136–145)

## 2024-07-26 PROCEDURE — 96374 THER/PROPH/DIAG INJ IV PUSH: CPT

## 2024-07-26 PROCEDURE — 99999 PR PBB SHADOW E&M-EST. PATIENT-LVL III: CPT | Mod: PBBFAC,,, | Performed by: REGISTERED NURSE

## 2024-07-26 PROCEDURE — 1126F AMNT PAIN NOTED NONE PRSNT: CPT | Mod: CPTII,S$GLB,, | Performed by: REGISTERED NURSE

## 2024-07-26 PROCEDURE — 86850 RBC ANTIBODY SCREEN: CPT | Performed by: EMERGENCY MEDICINE

## 2024-07-26 PROCEDURE — 25500020 PHARM REV CODE 255: Performed by: EMERGENCY MEDICINE

## 2024-07-26 PROCEDURE — 1100F PTFALLS ASSESS-DOCD GE2>/YR: CPT | Mod: CPTII,S$GLB,, | Performed by: REGISTERED NURSE

## 2024-07-26 PROCEDURE — 83880 ASSAY OF NATRIURETIC PEPTIDE: CPT | Performed by: EMERGENCY MEDICINE

## 2024-07-26 PROCEDURE — 93010 ELECTROCARDIOGRAM REPORT: CPT | Mod: ,,, | Performed by: INTERNAL MEDICINE

## 2024-07-26 PROCEDURE — 93005 ELECTROCARDIOGRAM TRACING: CPT

## 2024-07-26 PROCEDURE — 3288F FALL RISK ASSESSMENT DOCD: CPT | Mod: CPTII,S$GLB,, | Performed by: REGISTERED NURSE

## 2024-07-26 PROCEDURE — 80047 BASIC METABLC PNL IONIZED CA: CPT

## 2024-07-26 PROCEDURE — 86900 BLOOD TYPING SEROLOGIC ABO: CPT | Performed by: EMERGENCY MEDICINE

## 2024-07-26 PROCEDURE — 99285 EMERGENCY DEPT VISIT HI MDM: CPT | Mod: 25

## 2024-07-26 PROCEDURE — 85730 THROMBOPLASTIN TIME PARTIAL: CPT | Performed by: EMERGENCY MEDICINE

## 2024-07-26 PROCEDURE — 83690 ASSAY OF LIPASE: CPT | Performed by: EMERGENCY MEDICINE

## 2024-07-26 PROCEDURE — 3078F DIAST BP <80 MM HG: CPT | Mod: CPTII,S$GLB,, | Performed by: REGISTERED NURSE

## 2024-07-26 PROCEDURE — 99213 OFFICE O/P EST LOW 20 MIN: CPT | Mod: S$GLB,,, | Performed by: REGISTERED NURSE

## 2024-07-26 PROCEDURE — 84484 ASSAY OF TROPONIN QUANT: CPT | Performed by: EMERGENCY MEDICINE

## 2024-07-26 PROCEDURE — 1111F DSCHRG MED/CURRENT MED MERGE: CPT | Mod: CPTII,S$GLB,, | Performed by: REGISTERED NURSE

## 2024-07-26 PROCEDURE — 3074F SYST BP LT 130 MM HG: CPT | Mod: CPTII,S$GLB,, | Performed by: REGISTERED NURSE

## 2024-07-26 PROCEDURE — 80053 COMPREHEN METABOLIC PANEL: CPT | Performed by: EMERGENCY MEDICINE

## 2024-07-26 PROCEDURE — 63600175 PHARM REV CODE 636 W HCPCS: Performed by: EMERGENCY MEDICINE

## 2024-07-26 PROCEDURE — 85610 PROTHROMBIN TIME: CPT | Performed by: EMERGENCY MEDICINE

## 2024-07-26 PROCEDURE — 85025 COMPLETE CBC W/AUTO DIFF WBC: CPT | Performed by: EMERGENCY MEDICINE

## 2024-07-26 RX ORDER — MORPHINE SULFATE 2 MG/ML
6 INJECTION, SOLUTION INTRAMUSCULAR; INTRAVENOUS
Status: COMPLETED | OUTPATIENT
Start: 2024-07-26 | End: 2024-07-26

## 2024-07-26 RX ADMIN — IOHEXOL 100 ML: 350 INJECTION, SOLUTION INTRAVENOUS at 10:07

## 2024-07-26 RX ADMIN — MORPHINE SULFATE 6 MG: 2 INJECTION, SOLUTION INTRAMUSCULAR; INTRAVENOUS at 10:07

## 2024-07-26 NOTE — PROGRESS NOTES
Subjective     Patient ID: Misa Barajas is a 78 y.o. female.    Chief Complaint: Follow-up    HPI      Doing well. Returns with family. Denies fever, chills. Denies NVD. Denies issues with medication. Reporting soft stools.        Review of Systems       Objective     Physical Exam       Assessment and Plan     {There are no diagnoses linked to this encounter. (Refresh or delete this SmartLink)}    ***    Remove midline   Stop antibiotics   Return to clinic with concerns for infection.            No follow-ups on file.

## 2024-07-26 NOTE — PROGRESS NOTES
Pt to clinic for Midline removal:    JORGE Midline removed per protocol and MD order; pt tolerated well, catheter tip intact;  Pressure dressing of vasaline gauze, 2x2 gauze and kerlex placed to site;  Pt educated on site care;

## 2024-07-26 NOTE — PROGRESS NOTES
Subjective     Patient ID: Misa Barajas is a 78 y.o. female.    Chief Complaint: Follow-up    HPI    Ms. Barajas is a 77 yo female with HTN, HFpEF, COPD, home o2, pAF s/p ablation on 6/17/24, who was seen inpatient with pasturella bacteremia 2/2 cat bite to right arm. She presented with right arm cellulitis.     Blood cultures cleared on 7/12/24. Swelling improved prior to discharge. She presents today for follow up.     Today, reports she is doing well. Denies fever, chills. Denies NVD. Continues on ceftriaxone, plan for 2 weeks from cleared culture. Reports compliance.       Review of Systems   Constitutional:  Negative for chills, diaphoresis, fatigue and fever.   HENT: Negative.     Respiratory:  Negative for cough and shortness of breath.    Cardiovascular:  Negative for leg swelling.   Gastrointestinal:  Negative for abdominal pain, nausea and vomiting.   Musculoskeletal:  Negative for arthralgias and myalgias.   Neurological:  Negative for dizziness.   Psychiatric/Behavioral:  Negative for agitation and confusion.           Objective     Physical Exam  Vitals reviewed.   Constitutional:       General: She is not in acute distress.     Appearance: Normal appearance. She is not ill-appearing.   HENT:      Head: Normocephalic.      Nose: Nose normal.      Mouth/Throat:      Mouth: Mucous membranes are moist.      Pharynx: Oropharynx is clear.   Eyes:      General:         Right eye: No discharge.         Left eye: No discharge.      Conjunctiva/sclera: Conjunctivae normal.   Cardiovascular:      Rate and Rhythm: Normal rate and regular rhythm.      Pulses: Normal pulses.      Heart sounds: Normal heart sounds. No murmur heard.  Pulmonary:      Effort: Pulmonary effort is normal. No respiratory distress.      Breath sounds: Normal breath sounds. No stridor.   Abdominal:      General: Abdomen is flat. There is no distension.      Palpations: Abdomen is soft.   Musculoskeletal:         General: Normal range of  motion.      Cervical back: Normal range of motion.      Right lower leg: No edema.      Left lower leg: No edema.   Skin:     Findings: No erythema, lesion or rash.   Neurological:      General: No focal deficit present.      Mental Status: She is alert and oriented to person, place, and time.      Motor: No weakness.      Gait: Gait normal.   Psychiatric:         Mood and Affect: Mood normal.         Behavior: Behavior normal.            Assessment and Plan     1. Cellulitis of left upper extremity  -     Midline removal    2. Bacteremia  -     Midline removal    3. Cat bite of left upper arm, subsequent encounter  -     Midline removal        Okay to discontinue ceftriaxone. Dc picc.   Monitor off antibiotics.   Follow up as needed             20 minutes of total time was spent on this encounter, which includes face to face time and non-face to face time preparing to see the patient (eg, review of tests), Obtaining and/or reviewing separately obtained history, documenting clinical information in the electronic or other health record, independently interpreting results (not separately reported) and communicating results to the patient/family/caregiver, or care coordination (not separately reported).

## 2024-07-27 PROBLEM — K85.10 ACUTE BILIARY PANCREATITIS: Status: ACTIVE | Noted: 2024-07-27

## 2024-07-27 PROBLEM — J44.9 COPD (CHRONIC OBSTRUCTIVE PULMONARY DISEASE): Status: ACTIVE | Noted: 2022-08-22

## 2024-07-27 PROBLEM — R60.9 EDEMA: Status: ACTIVE | Noted: 2024-07-27

## 2024-07-27 LAB
ABO + RH BLD: NORMAL
ALBUMIN SERPL BCP-MCNC: 2.6 G/DL (ref 3.5–5.2)
ALP SERPL-CCNC: 162 U/L (ref 55–135)
ALT SERPL W/O P-5'-P-CCNC: 54 U/L (ref 10–44)
ANION GAP SERPL CALC-SCNC: 12 MMOL/L (ref 8–16)
APTT PPP: 24.9 SEC (ref 21–32)
AST SERPL-CCNC: 143 U/L (ref 10–40)
BASOPHILS # BLD AUTO: 0.06 K/UL (ref 0–0.2)
BASOPHILS NFR BLD: 0.6 % (ref 0–1.9)
BILIRUB SERPL-MCNC: 1.1 MG/DL (ref 0.1–1)
BILIRUB UR QL STRIP: NEGATIVE
BLD GP AB SCN CELLS X3 SERPL QL: NORMAL
BNP SERPL-MCNC: 654 PG/ML (ref 0–99)
BUN SERPL-MCNC: 18 MG/DL (ref 8–23)
CALCIUM SERPL-MCNC: 10.4 MG/DL (ref 8.7–10.5)
CHLORIDE SERPL-SCNC: 102 MMOL/L (ref 95–110)
CHOLEST SERPL-MCNC: 119 MG/DL (ref 120–199)
CHOLEST/HDLC SERPL: 2.3 {RATIO} (ref 2–5)
CLARITY UR REFRACT.AUTO: CLEAR
CO2 SERPL-SCNC: 24 MMOL/L (ref 23–29)
COLOR UR AUTO: YELLOW
CREAT SERPL-MCNC: 1.2 MG/DL (ref 0.5–1.4)
DIFFERENTIAL METHOD BLD: ABNORMAL
EOSINOPHIL # BLD AUTO: 0.3 K/UL (ref 0–0.5)
EOSINOPHIL NFR BLD: 2.8 % (ref 0–8)
ERYTHROCYTE [DISTWIDTH] IN BLOOD BY AUTOMATED COUNT: 14.1 % (ref 11.5–14.5)
EST. GFR  (NO RACE VARIABLE): 45.8 ML/MIN/1.73 M^2
GLUCOSE SERPL-MCNC: 134 MG/DL (ref 70–110)
GLUCOSE UR QL STRIP: NEGATIVE
HCT VFR BLD AUTO: 28.6 % (ref 37–48.5)
HDLC SERPL-MCNC: 52 MG/DL (ref 40–75)
HDLC SERPL: 43.7 % (ref 20–50)
HGB BLD-MCNC: 8.9 G/DL (ref 12–16)
HGB UR QL STRIP: ABNORMAL
HYALINE CASTS UR QL AUTO: 1 /LPF
IMM GRANULOCYTES # BLD AUTO: 0.06 K/UL (ref 0–0.04)
IMM GRANULOCYTES NFR BLD AUTO: 0.6 % (ref 0–0.5)
INR PPP: 1.1 (ref 0.8–1.2)
KETONES UR QL STRIP: NEGATIVE
LDLC SERPL CALC-MCNC: 57.8 MG/DL (ref 63–159)
LEUKOCYTE ESTERASE UR QL STRIP: NEGATIVE
LIPASE SERPL-CCNC: >3000 U/L (ref 4–60)
LYMPHOCYTES # BLD AUTO: 1.3 K/UL (ref 1–4.8)
LYMPHOCYTES NFR BLD: 13.2 % (ref 18–48)
MCH RBC QN AUTO: 28.6 PG (ref 27–31)
MCHC RBC AUTO-ENTMCNC: 31.1 G/DL (ref 32–36)
MCV RBC AUTO: 92 FL (ref 82–98)
MICROSCOPIC COMMENT: ABNORMAL
MONOCYTES # BLD AUTO: 0.9 K/UL (ref 0.3–1)
MONOCYTES NFR BLD: 8.7 % (ref 4–15)
NEUTROPHILS # BLD AUTO: 7.3 K/UL (ref 1.8–7.7)
NEUTROPHILS NFR BLD: 74.1 % (ref 38–73)
NITRITE UR QL STRIP: NEGATIVE
NONHDLC SERPL-MCNC: 67 MG/DL
NRBC BLD-RTO: 0 /100 WBC
OHS QRS DURATION: 166 MS
OHS QTC CALCULATION: 528 MS
PH UR STRIP: 7 [PH] (ref 5–8)
PLATELET # BLD AUTO: 263 K/UL (ref 150–450)
PMV BLD AUTO: 9.9 FL (ref 9.2–12.9)
POTASSIUM SERPL-SCNC: 3.4 MMOL/L (ref 3.5–5.1)
PROT SERPL-MCNC: 6.3 G/DL (ref 6–8.4)
PROT UR QL STRIP: NEGATIVE
PROTHROMBIN TIME: 12 SEC (ref 9–12.5)
RBC # BLD AUTO: 3.11 M/UL (ref 4–5.4)
RBC #/AREA URNS AUTO: >100 /HPF (ref 0–4)
SODIUM SERPL-SCNC: 138 MMOL/L (ref 136–145)
SP GR UR STRIP: 1.01 (ref 1–1.03)
SPECIMEN OUTDATE: NORMAL
TRIGL SERPL-MCNC: 46 MG/DL (ref 30–150)
TROPONIN I SERPL DL<=0.01 NG/ML-MCNC: 0.02 NG/ML (ref 0–0.03)
UNSPECIFIED CRY UR QL COMP ASSIST: 16
URN SPEC COLLECT METH UR: ABNORMAL
WBC # BLD AUTO: 9.86 K/UL (ref 3.9–12.7)
WBC #/AREA URNS AUTO: 16 /HPF (ref 0–5)

## 2024-07-27 PROCEDURE — 25000003 PHARM REV CODE 250

## 2024-07-27 PROCEDURE — 63600175 PHARM REV CODE 636 W HCPCS

## 2024-07-27 PROCEDURE — 81001 URINALYSIS AUTO W/SCOPE: CPT | Performed by: EMERGENCY MEDICINE

## 2024-07-27 PROCEDURE — 99900035 HC TECH TIME PER 15 MIN (STAT)

## 2024-07-27 PROCEDURE — 99223 1ST HOSP IP/OBS HIGH 75: CPT | Mod: GC,,, | Performed by: INTERNAL MEDICINE

## 2024-07-27 PROCEDURE — 96361 HYDRATE IV INFUSION ADD-ON: CPT

## 2024-07-27 PROCEDURE — 87086 URINE CULTURE/COLONY COUNT: CPT | Performed by: EMERGENCY MEDICINE

## 2024-07-27 PROCEDURE — 82652 VIT D 1 25-DIHYDROXY: CPT

## 2024-07-27 PROCEDURE — 25000242 PHARM REV CODE 250 ALT 637 W/ HCPCS

## 2024-07-27 PROCEDURE — 51701 INSERT BLADDER CATHETER: CPT

## 2024-07-27 PROCEDURE — 80061 LIPID PANEL: CPT

## 2024-07-27 PROCEDURE — 94761 N-INVAS EAR/PLS OXIMETRY MLT: CPT

## 2024-07-27 PROCEDURE — 36415 COLL VENOUS BLD VENIPUNCTURE: CPT

## 2024-07-27 PROCEDURE — 21400001 HC TELEMETRY ROOM

## 2024-07-27 PROCEDURE — 94640 AIRWAY INHALATION TREATMENT: CPT

## 2024-07-27 RX ORDER — FLUTICASONE FUROATE AND VILANTEROL 100; 25 UG/1; UG/1
1 POWDER RESPIRATORY (INHALATION) DAILY
Status: DISCONTINUED | OUTPATIENT
Start: 2024-07-27 | End: 2024-08-12 | Stop reason: HOSPADM

## 2024-07-27 RX ORDER — NALOXONE HCL 0.4 MG/ML
0.02 VIAL (ML) INJECTION
Status: DISCONTINUED | OUTPATIENT
Start: 2024-07-27 | End: 2024-08-12 | Stop reason: HOSPADM

## 2024-07-27 RX ORDER — OXYCODONE HYDROCHLORIDE 5 MG/1
5 TABLET ORAL EVERY 6 HOURS PRN
Status: DISCONTINUED | OUTPATIENT
Start: 2024-07-27 | End: 2024-08-12 | Stop reason: HOSPADM

## 2024-07-27 RX ORDER — FUROSEMIDE 10 MG/ML
20 INJECTION INTRAMUSCULAR; INTRAVENOUS ONCE
Status: COMPLETED | OUTPATIENT
Start: 2024-07-27 | End: 2024-07-27

## 2024-07-27 RX ORDER — IBUPROFEN 200 MG
16 TABLET ORAL
Status: DISCONTINUED | OUTPATIENT
Start: 2024-07-27 | End: 2024-08-12 | Stop reason: HOSPADM

## 2024-07-27 RX ORDER — ALUMINUM HYDROXIDE, MAGNESIUM HYDROXIDE, AND SIMETHICONE 1200; 120; 1200 MG/30ML; MG/30ML; MG/30ML
30 SUSPENSION ORAL 4 TIMES DAILY PRN
Status: DISCONTINUED | OUTPATIENT
Start: 2024-07-27 | End: 2024-08-12 | Stop reason: HOSPADM

## 2024-07-27 RX ORDER — IBUPROFEN 200 MG
24 TABLET ORAL
Status: DISCONTINUED | OUTPATIENT
Start: 2024-07-27 | End: 2024-08-12 | Stop reason: HOSPADM

## 2024-07-27 RX ORDER — TALC
6 POWDER (GRAM) TOPICAL NIGHTLY PRN
Status: DISCONTINUED | OUTPATIENT
Start: 2024-07-27 | End: 2024-08-12 | Stop reason: HOSPADM

## 2024-07-27 RX ORDER — HYDROMORPHONE HYDROCHLORIDE 1 MG/ML
0.5 INJECTION, SOLUTION INTRAMUSCULAR; INTRAVENOUS; SUBCUTANEOUS ONCE
Status: COMPLETED | OUTPATIENT
Start: 2024-07-27 | End: 2024-07-27

## 2024-07-27 RX ORDER — METOPROLOL SUCCINATE 50 MG/1
100 TABLET, EXTENDED RELEASE ORAL DAILY
Status: DISCONTINUED | OUTPATIENT
Start: 2024-07-27 | End: 2024-08-12 | Stop reason: HOSPADM

## 2024-07-27 RX ORDER — HYDROMORPHONE HYDROCHLORIDE 1 MG/ML
0.5 INJECTION, SOLUTION INTRAMUSCULAR; INTRAVENOUS; SUBCUTANEOUS EVERY 4 HOURS PRN
Status: DISCONTINUED | OUTPATIENT
Start: 2024-07-27 | End: 2024-08-01

## 2024-07-27 RX ORDER — ONDANSETRON HYDROCHLORIDE 2 MG/ML
4 INJECTION, SOLUTION INTRAVENOUS EVERY 8 HOURS PRN
Status: DISCONTINUED | OUTPATIENT
Start: 2024-07-27 | End: 2024-08-12 | Stop reason: HOSPADM

## 2024-07-27 RX ORDER — POLYETHYLENE GLYCOL 3350 17 G/17G
17 POWDER, FOR SOLUTION ORAL DAILY
Status: DISCONTINUED | OUTPATIENT
Start: 2024-07-27 | End: 2024-08-12 | Stop reason: HOSPADM

## 2024-07-27 RX ORDER — FUROSEMIDE 20 MG/1
40 TABLET ORAL DAILY
Status: DISCONTINUED | OUTPATIENT
Start: 2024-07-27 | End: 2024-07-30

## 2024-07-27 RX ORDER — SIMETHICONE 80 MG
1 TABLET,CHEWABLE ORAL 4 TIMES DAILY PRN
Status: DISCONTINUED | OUTPATIENT
Start: 2024-07-27 | End: 2024-08-12 | Stop reason: HOSPADM

## 2024-07-27 RX ORDER — PROCHLORPERAZINE EDISYLATE 5 MG/ML
5 INJECTION INTRAMUSCULAR; INTRAVENOUS EVERY 6 HOURS PRN
Status: DISCONTINUED | OUTPATIENT
Start: 2024-07-27 | End: 2024-08-12 | Stop reason: HOSPADM

## 2024-07-27 RX ORDER — SODIUM CHLORIDE, SODIUM LACTATE, POTASSIUM CHLORIDE, CALCIUM CHLORIDE 600; 310; 30; 20 MG/100ML; MG/100ML; MG/100ML; MG/100ML
INJECTION, SOLUTION INTRAVENOUS CONTINUOUS
Status: ACTIVE | OUTPATIENT
Start: 2024-07-27 | End: 2024-07-28

## 2024-07-27 RX ORDER — SODIUM CHLORIDE 0.9 % (FLUSH) 0.9 %
10 SYRINGE (ML) INJECTION EVERY 12 HOURS PRN
Status: DISCONTINUED | OUTPATIENT
Start: 2024-07-27 | End: 2024-08-12 | Stop reason: HOSPADM

## 2024-07-27 RX ORDER — GLUCAGON 1 MG
1 KIT INJECTION
Status: DISCONTINUED | OUTPATIENT
Start: 2024-07-27 | End: 2024-08-12 | Stop reason: HOSPADM

## 2024-07-27 RX ORDER — POTASSIUM CHLORIDE 7.45 MG/ML
10 INJECTION INTRAVENOUS
Status: COMPLETED | OUTPATIENT
Start: 2024-07-27 | End: 2024-07-27

## 2024-07-27 RX ADMIN — POTASSIUM CHLORIDE 10 MEQ: 7.46 INJECTION, SOLUTION INTRAVENOUS at 06:07

## 2024-07-27 RX ADMIN — HYDROMORPHONE HYDROCHLORIDE 0.5 MG: 1 INJECTION, SOLUTION INTRAMUSCULAR; INTRAVENOUS; SUBCUTANEOUS at 05:07

## 2024-07-27 RX ADMIN — SODIUM CHLORIDE, POTASSIUM CHLORIDE, SODIUM LACTATE AND CALCIUM CHLORIDE 500 ML: 600; 310; 30; 20 INJECTION, SOLUTION INTRAVENOUS at 03:07

## 2024-07-27 RX ADMIN — POTASSIUM CHLORIDE 10 MEQ: 7.46 INJECTION, SOLUTION INTRAVENOUS at 05:07

## 2024-07-27 RX ADMIN — FLUTICASONE FUROATE AND VILANTEROL TRIFENATATE 1 PUFF: 100; 25 POWDER RESPIRATORY (INHALATION) at 10:07

## 2024-07-27 RX ADMIN — OXYCODONE HYDROCHLORIDE 5 MG: 5 TABLET ORAL at 10:07

## 2024-07-27 RX ADMIN — CEFTRIAXONE 1 G: 1 INJECTION, POWDER, FOR SOLUTION INTRAMUSCULAR; INTRAVENOUS at 05:07

## 2024-07-27 RX ADMIN — FUROSEMIDE 40 MG: 20 TABLET ORAL at 10:07

## 2024-07-27 RX ADMIN — POLYETHYLENE GLYCOL 3350 17 G: 17 POWDER, FOR SOLUTION ORAL at 10:07

## 2024-07-27 RX ADMIN — SODIUM CHLORIDE, POTASSIUM CHLORIDE, SODIUM LACTATE AND CALCIUM CHLORIDE: 600; 310; 30; 20 INJECTION, SOLUTION INTRAVENOUS at 11:07

## 2024-07-27 RX ADMIN — HYDROMORPHONE HYDROCHLORIDE 0.5 MG: 1 INJECTION, SOLUTION INTRAMUSCULAR; INTRAVENOUS; SUBCUTANEOUS at 11:07

## 2024-07-27 RX ADMIN — METOPROLOL SUCCINATE 100 MG: 50 TABLET, EXTENDED RELEASE ORAL at 10:07

## 2024-07-27 RX ADMIN — POTASSIUM CHLORIDE 10 MEQ: 7.46 INJECTION, SOLUTION INTRAVENOUS at 11:07

## 2024-07-27 RX ADMIN — POTASSIUM CHLORIDE 10 MEQ: 7.46 INJECTION, SOLUTION INTRAVENOUS at 04:07

## 2024-07-27 RX ADMIN — FUROSEMIDE 20 MG: 10 INJECTION, SOLUTION INTRAMUSCULAR; INTRAVENOUS at 04:07

## 2024-07-27 RX ADMIN — SODIUM CHLORIDE, POTASSIUM CHLORIDE, SODIUM LACTATE AND CALCIUM CHLORIDE 500 ML: 600; 310; 30; 20 INJECTION, SOLUTION INTRAVENOUS at 02:07

## 2024-07-27 RX ADMIN — PIPERACILLIN SODIUM AND TAZOBACTAM SODIUM 4.5 G: 4; .5 INJECTION, POWDER, FOR SOLUTION INTRAVENOUS at 07:07

## 2024-07-27 NOTE — CONSULTS
[facility] Consult Note    Attending: Brea Casanova DO   Admit Date: 7/26/2024  Today's Date: 07/27/2024    SUBJECTIVE:     HPI:  Misa aBrajas is a 78 y.o. F with PMHx of HTN, HLD, HFpEF, COPD (on 2L NC) and paroxysmal Afib (on Eliquis, last dose 7/26) who presents with RUQ and epigastric abdominal pain radiating to the back and found to have acute pancreatitis and cholelithiasis on imaging. AES consulted for concern of retained stone. Patient denies fevers, chills, nausea, vomiting, jaundice, scleral icterus.    Labs show lipase >3000, , ALT 54, T bili 1.1, alk phos 162. CT AP and US abdomen with pancreatitis, cholelithiasis but no choledocholithiasis. No intra/extra hepatic biliary dilation or pancreatic duct dilation.    Most Recent Endoscopic Procedures:     Review of patient's allergies indicates:   Allergen Reactions    Adhesive tape-silicones     Adhesive Rash     Pt states she removed a LATEX bandaid and the skin beneath was swollen and red. No other latex causes a reaction.       Past Medical History:   Diagnosis Date    Acute biliary pancreatitis 7/27/2024    Acute hypoxemic respiratory failure 6/26/2021    Acute superficial venous thrombosis of lower extremity, bilateral 1/25/2022    Anemia 7/12/2024    Aortic atherosclerosis     Arthritis     knee joint pain    Asthma-COPD overlap syndrome 8/22/2022    Breast cancer 2002    left breast & lymph nodes-s/p sx with chemo    Bronchitis     with flu-2/2014    Cataracts, bilateral     Chronic anticoagulation 5/4/2022    Chronic diastolic heart failure 12/24/2019    Chronic kidney disease, stage 3     Class 1 obesity due to excess calories with serious comorbidity and body mass index (BMI) of 30.0 to 30.9 in adult 5/16/2024    History of chemotherapy     last treatment 12/2002 (had 8 treatments)    Hyperlipidemia 11/20/2016    Hypertension     Lymphedema of both lower extremities 1/25/2022    Nephrolithiasis 6/2/2014     Osteoporosis     Paroxysmal atrial fibrillation 6/7/2021    Renal calculi     hematuria    Renal osteodystrophy 1/14/2016    Venous stasis dermatitis of both lower extremities 1/25/2022    Vitamin D insufficiency      Past Surgical History:   Procedure Laterality Date    ABLATION, ATRIAL FLUTTER, TYPICAL N/A 6/17/2024    Procedure: Ablation, Atrial Flutter, Typical;  Surgeon: Taz Church MD;  Location: [facility] EP LAB;  Service: Cardiology;  Laterality: N/A;  AFL, RFA, LORENE, MAKAYLA (cx if SR), paul, MB, 3 Prep    BREAST BIOPSY Left 2002    core bx, +    BREAST BIOPSY Right 2018    core    BREAST BIOPSY Right 2019    BREAST LUMPECTOMY Left 2002    CYSTOSCOPY  4/28/2022    Procedure: CYSTOSCOPY;  Surgeon: Vik Ware MD;  [facility];  Service: Urology;;    CYSTOSCOPY  5/30/2022    Procedure: CYSTOSCOPY;  Surgeon: Bonnie Knutson MD;  Location: [facility];  Service: Urology;;    ECHOCARDIOGRAM,TRANSESOPHAGEAL N/A 6/17/2024    Procedure: Transesophageal echo (MAKAYLA) intra-procedure log documentation;  Surgeon: Nestor Martinez MD;  Location: [facility] EP LAB;  Service: Cardiology;  Laterality: N/A;    LASER LITHOTRIPSY  5/30/2022    Procedure: LITHOTRIPSY, USING LASER;  Surgeon: Bonnie Knutson MD;  Location: [facility];  Service: Urology;;    LITHOTRIPSY      MASTECTOMY Left 06/2002    left-& lymph node dissection    REPLACEMENT OF STENT Right 5/30/2022    Procedure: REPLACEMENT, STENT;  Surgeon: Bonnie Knutson MD;  Location: [facility] OR 1ST FLR;  Service: Urology;  Laterality: Right;    RETROGRADE PYELOGRAPHY Right 4/28/2022    Procedure: PYELOGRAM, RETROGRADE;  Surgeon: Vik Ware MD;  Location: [facility] OR 1ST FLR;  Service: Urology;  Laterality: Right;    ROBOT-ASSISTED LAPAROSCOPIC PARTIAL NEPHRECTOMY USING DA JESÚS XI Right 3/11/2021    Procedure: XI ROBOTIC NEPHRECTOMY, PARTIAL;  Surgeon: Taz Ortega MD;  Location: [facility] OR 2ND FLR;  Service: Urology;   Laterality: Right;  4hr/ gen with regional  [facility] confirmation:  [id number] for 11:15am case NC    URETEROSCOPIC REMOVAL OF URETERIC CALCULUS Right 5/30/2022    Procedure: REMOVAL, CALCULUS, URETER, URETEROSCOPIC;  Surgeon: Bonnie Knutson MD;  Location: [facility] OR [address] [place] [address];  Service: Urology;  Laterality: Right;    ureteroscopy Bilateral     6.14    URETEROSCOPY Right 5/30/2022    Procedure: URETEROSCOPY;  Surgeon: Bonnie Knutson MD;  Location: [facility] OR [address] [place] [address];  Service: Urology;  Laterality: Right;     Family History   Problem Relation Name Age of Onset    Bone cancer Mother Bibi     Breast cancer Mother Bibi     Arthritis Mother Bibi         Breast Cancer    Breast cancer Sister      Cancer Father Edaudi         Asbestos cancer    No Known Problems Brother      Fibroids Daughter      Crohn's disease Daughter      No Known Problems Daughter      Arthritis Sister          Breast Cancer    Anesthesia problems Neg Hx      Glaucoma Neg Hx       Social History     Tobacco Use    Smoking status: Former     Current packs/day: 0.00     Average packs/day: 1 pack/day for 50.0 years (50.0 ttl pk-yrs)     Types: Cigarettes     Start date: 1960     Quit date: 5/20/2009     Years since quitting: 15.1    Smokeless tobacco: Former   Substance Use Topics    Alcohol use: No    Drug use: No       All home medications reviewed.      OBJECTIVE:     Vital Signs Trends/History Reviewed  Vitals:    07/27/24 1010 07/27/24 1122 07/27/24 1123 07/27/24 1144   BP:       Pulse: 73  (!) 143 77   Resp: 20 18 18    Temp:   98.4 °F (36.9 °C)    TempSrc:   Oral    SpO2:   (!) 90%    Weight:       Height:           Physical Exam  Constitutional:       General: She is not in acute distress.     Appearance: She is obese.   Abdominal:      General: There is no distension.      Palpations: Abdomen is soft.      Tenderness: There is no guarding or rebound.      Comments: Tenderness to palpation of RUQ and epigastric region   Skin:      Coloration: Skin is not jaundiced.   Neurological:      Mental Status: She is oriented to person, place, and time.   Psychiatric:         Mood and Affect: Mood normal.         Laboratory: All labs results within last 24 hours have been reviewed.     Imaging: All imaging results within the last 24 hours have been reviewed.       Infusions:     lactated ringers   Intravenous Continuous   Paused at 07/27/24 1200       Scheduled Medications:    fluticasone furoate-vilanteroL  1 puff Inhalation Daily    furosemide  40 mg Oral Daily    metoprolol succinate  100 mg Oral Daily    piperacillin-tazobactam (Zosyn) IV (PEDS and ADULTS) (extended infusion is not appropriate)  4.5 g Intravenous Q8H    polyethylene glycol  17 g Oral Daily    potassium chloride  10 mEq Intravenous Q1H       PRN Medications:     Current Facility-Administered Medications:     aluminum-magnesium hydroxide-simethicone, 30 mL, Oral, QID PRN    dextrose 10%, 12.5 g, Intravenous, PRN    dextrose 10%, 25 g, Intravenous, PRN    glucagon (human recombinant), 1 mg, Intramuscular, PRN    glucose, 16 g, Oral, PRN    glucose, 24 g, Oral, PRN    HYDROmorphone, 0.5 mg, Intravenous, Q4H PRN    melatonin, 6 mg, Oral, Nightly PRN    naloxone, 0.02 mg, Intravenous, PRN    ondansetron, 4 mg, Intravenous, Q8H PRN    oxyCODONE, 5 mg, Oral, Q6H PRN    prochlorperazine, 5 mg, Intravenous, Q6H PRN    simethicone, 1 tablet, Oral, QID PRN    sodium chloride 0.9%, 10 mL, Intravenous, Q12H PRN    ASSESSMENT:      78 y.o. with PMHx of HTN, HLD, HFpEF, COPD (on 2L NC) and paroxysmal Afib (on Eliquis, last dose 7/26) who presents with RUQ and epigastric abdominal pain radiating to the back and found to have acute pancreatitis and cholelithiasis on imaging. AES consulted for concern of retained stone. Patient denies fevers, chills, nausea, vomiting, jaundice, scleral icterus.    Labs show lipase >3000, , ALT 54, T bili 1.1, alk phos 162. CT AP and US abdomen with  pancreatitis, cholelithiasis but no choledocholithiasis. No intra/extra hepatic biliary dilation or pancreatic duct dilation.    RECOMMENDATIONS:      - No suspicion of retained stone at this time  - Since MRI MRCP has been ordered, will follow up results  - Recommend surgery consult for lap asad with intra-op cholangiogram    Thank you for allowing us to participate in the care of this patient. Please call with questions.      Timothy Valdivia MD , PGY-IV  Gastroenterology Fellow  [facility]

## 2024-07-27 NOTE — ED NOTES
Nurses Note -- 4 Eyes      7/27/2024   2:36 AM      Skin assessed during: Admit      [] No Altered Skin Integrity Present    []Prevention Measures Documented      [x] Yes- Altered Skin Integrity Present or Discovered   [] LDA Added if Not in Epic (Describe Wound)   [] New Altered Skin Integrity was Present on Admit and Documented in LDA   [] Wound Image Taken    Wound Care Consulted? Yes    Attending Nurse:  Susy Mota RN     Second RN/Staff Member:  GINGER Daly

## 2024-07-27 NOTE — CONSULTS
[facility] ([place] [address])  General Surgery  Consult Note    Patient Name: Misa Barajas  MRN: [id number]  Code Status: Full Code  Admission Date: 7/26/2024  Hospital Length of Stay: 0 days  Attending Physician: Brea Casanova DO  Primary Care Provider: Celia Carlisle MD    Patient information was obtained from patient, relative(s), past medical records, and ER records.     Inpatient consult to General Surgery  Consult performed by: Lang Cavanaugh MD  Consult ordered by: Sammi Umana MD        Subjective:     Principal Problem: Acute biliary pancreatitis    History of Present Illness: Ms. Barajas is a pleasant 78 yoF with complex PMH including HTN, HLD, HFpEF, COPD (on 2L NC baseline O2 sat 92%) and pAF (Eliquis, last dose 7/26) who presented to the ED with severe periumbilical abdominal pain that radiated to her back. Workup in the ED demonstrated stable vital signs on 2L NC, lab work with Lipase > 3000, , and Tbili 1.1 with mildly elevated LFTs and normal WBC. Imaging demonstrated thickening of her gallbladder wall with trace pericholecystic fluid, gallstones, and no biliary dilation. AES and General Surgery consulted.    This morning her pain his improved, but still present. She denies nausea currently, but does endorse a long history of recurrent episodes of nausea. She denies ever having pain like this before. No fevers or chills. She is on home O2, but is able to complete ADLs around the house without assistance. Her daughter lives down the street and helps out from time to time. She is unable to ambulate a city block or climb a flight of stairs. METS < 4. Abdominal surgical history significant for a laparoscopic right partial nephrectomy in 2021.     No current facility-administered medications on file prior to encounter.     Current Outpatient Medications on File Prior to Encounter   Medication Sig    acetaminophen (TYLENOL) 500 MG tablet Take 2 tablets (1,000 mg total) by  mouth every 8 (eight) hours as needed for Pain.    albuterol (PROVENTIL HFA) 90 mcg/actuation inhaler Inhale 2 puffs into the lungs every 6 (six) hours as needed for Wheezing. Rescue    apixaban (ELIQUIS) 2.5 mg Tab Take 1 tablet (2.5 mg total) by mouth 2 (two) times a day.    aspirin (ECOTRIN) 81 MG EC tablet Take 81 mg by mouth once daily.    biotin 1 mg tablet Take 1,000 mcg by mouth once daily.    budesonide-glycopyr-formoterol (BREZTRI AEROSPHERE) 160-9-4.8 mcg/actuation HFAA Inhale 2 puffs into the lungs 2 (two) times daily.    busPIRone (BUSPAR) 5 MG Tab Take 5 mg by mouth as needed.    [] D5W PgBk 100 mL with cefTRIAXone 2 gram SolR 2 g Inject 2 g into the vein once daily. for 9 days    fluticasone propionate (FLONASE) 50 mcg/actuation nasal spray 1 spray by Each Nostril route daily as needed (congestion).    furosemide (LASIX) 40 MG tablet Take 1 tablet (40 mg total) by mouth once daily.    inhalation spacing device Use as directed for inhalation.    metoprolol succinate (TOPROL-XL) 100 MG 24 hr tablet Take 1 tablet (100 mg total) by mouth once daily.    vitamin D (VITAMIN D3) 1000 units Tab Take 2 tablets (2,000 Units total) by mouth once daily.       Review of patient's allergies indicates:   Allergen Reactions    Adhesive tape-silicones     Adhesive Rash     Pt states she removed a LATEX bandaid and the skin beneath was swollen and red. No other latex causes a reaction.       Past Medical History:   Diagnosis Date    Acute biliary pancreatitis 2024    Acute hypoxemic respiratory failure 2021    Acute superficial venous thrombosis of lower extremity, bilateral 2022    Anemia 2024    Aortic atherosclerosis     Arthritis     knee joint pain    Asthma-COPD overlap syndrome 2022    Breast cancer     left breast & lymph nodes-s/p sx with chemo    Bronchitis     with flu-2014    Cataracts, bilateral     Chronic anticoagulation 2022    Chronic diastolic heart failure  12/24/2019    Chronic kidney disease, stage 3     Class 1 obesity due to excess calories with serious comorbidity and body mass index (BMI) of 30.0 to 30.9 in adult 5/16/2024    History of chemotherapy     last treatment 12/2002 (had 8 treatments)    Hyperlipidemia 11/20/2016    Hypertension     Lymphedema of both lower extremities 1/25/2022    Nephrolithiasis 6/2/2014    Osteoporosis     Paroxysmal atrial fibrillation 6/7/2021    Renal calculi     hematuria    Renal osteodystrophy 1/14/2016    Venous stasis dermatitis of both lower extremities 1/25/2022    Vitamin D insufficiency      Past Surgical History:   Procedure Laterality Date    ABLATION, ATRIAL FLUTTER, TYPICAL N/A 6/17/2024    Procedure: Ablation, Atrial Flutter, Typical;  Surgeon: Taz hCurch MD;  Location: [facility] EP LAB;  Service: Cardiology;  Laterality: N/A;  AFL, RFA, LORENE, MAKAYLA (cx if SR), anes, MB, 3 Prep    BREAST BIOPSY Left 2002    core bx, +    BREAST BIOPSY Right 2018    core    BREAST BIOPSY Right 2019    BREAST LUMPECTOMY Left 2002    CYSTOSCOPY  4/28/2022    Procedure: CYSTOSCOPY;  Surgeon: Vik Ware MD;  Location: [facility] OR [address] [place] [address];  Service: Urology;;    CYSTOSCOPY  5/30/2022    Procedure: CYSTOSCOPY;  Surgeon: Bonnie Knutson MD;  Location: [facility] OR [address] [place] [address];  Service: Urology;;    ECHOCARDIOGRAM,TRANSESOPHAGEAL N/A 6/17/2024    Procedure: Transesophageal echo (MAKAYLA) intra-procedure log documentation;  Surgeon: Nestor Martinez MD;  Location: [facility] EP LAB;  Service: Cardiology;  Laterality: N/A;    LASER LITHOTRIPSY  5/30/2022    Procedure: LITHOTRIPSY, USING LASER;  Surgeon: Bonnie Knutson MD;  Location: [facility] OR [address] [place] [address];  Service: Urology;;    LITHOTRIPSY      MASTECTOMY Left 06/2002    left-& lymph node dissection    REPLACEMENT OF STENT Right 5/30/2022    Procedure: REPLACEMENT, STENT;  Surgeon: Bonnie Knutson MD;  Location: [facility] OR [address] [place] [address];  Service: Urology;  Laterality: Right;    RETROGRADE  PYELOGRAPHY Right 4/28/2022    Procedure: PYELOGRAM, RETROGRADE;  Surgeon: Vik Ware MD;  Location: [facility] OR 1ST FLR;  Service: Urology;  Laterality: Right;    ROBOT-ASSISTED LAPAROSCOPIC PARTIAL NEPHRECTOMY USING DA JESÚS XI Right 3/11/2021    Procedure: XI ROBOTIC NEPHRECTOMY, PARTIAL;  Surgeon: Taz Ortega MD;  Location: [facility] OR 2ND FLR;  Service: Urology;  Laterality: Right;  4hr/ gen with regional  [facility] confirmation:  [id number] for 11:15am case NC    URETEROSCOPIC REMOVAL OF URETERIC CALCULUS Right 5/30/2022    Procedure: REMOVAL, CALCULUS, URETER, URETEROSCOPIC;  Surgeon: Bonnie Knutson MD;  Location: [facility] OR 1ST FLR;  Service: Urology;  Laterality: Right;    ureteroscopy Bilateral     6.14    URETEROSCOPY Right 5/30/2022    Procedure: URETEROSCOPY;  Surgeon: Bonnie Knutson MD;  Location: [facility] OR 1ST FLR;  Service: Urology;  Laterality: Right;     Family History       Problem Relation (Age of Onset)    Arthritis Mother, Sister    Bone cancer Mother    Breast cancer Mother, Sister    Cancer Father    Crohn's disease Daughter    Fibroids Daughter    No Known Problems Brother, Daughter          Tobacco Use    Smoking status: Former     Current packs/day: 0.00     Average packs/day: 1 pack/day for 50.0 years (50.0 ttl pk-yrs)     Types: Cigarettes     Start date: 1960     Quit date: 5/20/2009     Years since quitting: 15.1    Smokeless tobacco: Former   Substance and Sexual Activity    Alcohol use: No    Drug use: No    Sexual activity: Not Currently     Partners: Male     Birth control/protection: Partner-Vasectomy     Review of Systems   Constitutional:  Negative for chills and fever.   Respiratory:  Negative for cough and shortness of breath.    Cardiovascular:  Negative for chest pain and palpitations.   Gastrointestinal:  Positive for abdominal pain and nausea. Negative for vomiting.   Genitourinary:  Negative for dysuria.   Neurological:  Negative for dizziness and  light-headedness.     Objective:     Vital Signs (Most Recent):  Temp: 98.4 °F (36.9 °C) (07/27/24 1123)  Pulse: 77 (07/27/24 1144)  Resp: 18 (07/27/24 1123)  BP: (!) 146/83 (07/27/24 0730)  SpO2: (!) 90 % (07/27/24 1123) Vital Signs (24h Range):  Temp:  [97.7 °F (36.5 °C)-98.4 °F (36.9 °C)] 98.4 °F (36.9 °C)  Pulse:  [] 77  Resp:  [12-24] 18  SpO2:  [90 %-98 %] 90 %  BP: (140-175)/(61-83) 146/83     Weight: 77.1 kg (169 lb 15.6 oz)  Body mass index is 29.18 kg/m².     Physical Exam  Vitals reviewed.   Constitutional:       Appearance: Normal appearance.   Cardiovascular:      Rate and Rhythm: Normal rate.      Pulses: Normal pulses.   Pulmonary:      Effort: No respiratory distress.   Abdominal:      General: There is no distension.      Palpations: Abdomen is soft. There is no mass.      Tenderness: There is abdominal tenderness. There is no guarding.      Comments: Diffuse, mild tenderness, worst in the periumbilical region   Skin:     General: Skin is warm and dry.   Neurological:      General: No focal deficit present.      Mental Status: She is alert and oriented to person, place, and time.            I have reviewed all pertinent lab results within the past 24 hours.  CBC:   Recent Labs   Lab 07/26/24  2317   WBC 9.86   RBC 3.11*   HGB 8.9*   HCT 28.6*      MCV 92   MCH 28.6   MCHC 31.1*     CMP:   Recent Labs   Lab 07/26/24  2317   *   CALCIUM 10.4   ALBUMIN 2.6*   PROT 6.3      K 3.4*   CO2 24      BUN 18   CREATININE 1.2   ALKPHOS 162*   ALT 54*   *   BILITOT 1.1*       Significant Diagnostics:  I have reviewed all pertinent imaging results/findings within the past 24 hours.    Assessment/Plan:     * Acute biliary pancreatitis  78 yoF with biliary pancreatitis. AES consulted, MRCP ordered. Last dose of Eliquis was morning of 7/26. Gallbladder wall thickening likely secondary to pancreatitis and prior episodes of cholecystitis given history. We discussed the indication  for same stay cholecystectomy in order to prevent future episodes of gallstone pancreatitis. We also discussed the increased risk of surgery given her co morbidities and the potential decreased risk of recurrence if ERCP with sphincterotomy were to be performed.    - General Surgery to follow for potential same stay cholecystectomy  - Follow up MRCP and AES recommendations  - Hold Eliquis  - Trend CBC, CMP, and lipase  - Serial abdominal exams      VTE Risk Mitigation (From admission, onward)           Ordered     Reason for No Pharmacological VTE Prophylaxis  Once        Comments: Anticipate possible procerdure   Question:  Reasons:  Answer:  Physician Provided (leave comment)    07/27/24 0430     IP VTE HIGH RISK PATIENT  Once         07/27/24 0430     Place sequential compression device  Until discontinued         07/27/24 0430                    Thank you for your consult. I will follow-up with patient. Please contact us if you have any additional questions.    Lang Cavanaugh MD  General Surgery  [facility] ([place] [facility]

## 2024-07-27 NOTE — ED PROVIDER NOTES
Encounter Date: 7/26/2024       History     Chief Complaint   Patient presents with    Flank Pain     X3 hours that radiates upper back. Had ablation for aflutter 1 month ago, was told to come to the ED if she had any symptoms. Denies CP/SOB. Wears 2L NC at [facility] for COPD      The history is provided by the patient and a relative.     Mrs. Barajas is an 77 yo female who present with acute onset RUQ and epigastric pain radiating to her right back and mild SOB. She has a history nephrolithiasis, chronic diastolic heart failure, breast cancer, COPD, cardiac ablation procedure for atrial fibrillation, and UTIs.   Patient presents with RUQ and epigastric pain that started acutely this afternoon. She rates her pain as 10/10 intensity, sharp and non-pleuritic in nature. She reports a past history of nephrolithiasis but states that this pain is different. Pain is not post-prandial in nature and explains that this is differen than her past episodes of nephrolithiasis. SOB started at around thesame time and she normally is on 2L/min of at home oxygen due to her COPD. She states that she was watching TV when the pain suddenly started. She denies nausea, vomiting, fever, chills, lower urinary tract symptoms including hamturia or dysuria. She recently completed a course of antibitoics after sustaining a cat bite to her left arm. No recent changes in appetite reported. She also denies diarrhea, constipation.   She lives alone in a house, completes all ADLs independantly, and has the support of her daughter who lives 3 houses down the street.     Review of patient's allergies indicates:   Allergen Reactions    Adhesive tape-silicones     Adhesive Rash     Pt states she removed a LATEX bandaid and the skin beneath was swollen and red. No other latex causes a reaction.     Past Medical History:   Diagnosis Date    Acute biliary pancreatitis 7/27/2024    Acute hypoxemic respiratory failure 6/26/2021    Acute superficial venous  thrombosis of lower extremity, bilateral 1/25/2022    Anemia 7/12/2024    Aortic atherosclerosis     Arthritis     knee joint pain    Asthma-COPD overlap syndrome 8/22/2022    Breast cancer 2002    left breast & lymph nodes-s/p sx with chemo    Bronchitis     with flu-2/2014    Cataracts, bilateral     Chronic anticoagulation 5/4/2022    Chronic diastolic heart failure 12/24/2019    Chronic kidney disease, stage 3     Class 1 obesity due to excess calories with serious comorbidity and body mass index (BMI) of 30.0 to 30.9 in adult 5/16/2024    History of chemotherapy     last treatment 12/2002 (had 8 treatments)    Hyperlipidemia 11/20/2016    Hypertension     Lymphedema of both lower extremities 1/25/2022    Nephrolithiasis 6/2/2014    Osteoporosis     Paroxysmal atrial fibrillation 6/7/2021    Renal calculi     hematuria    Renal osteodystrophy 1/14/2016    Venous stasis dermatitis of both lower extremities 1/25/2022    Vitamin D insufficiency      Past Surgical History:   Procedure Laterality Date    ABLATION, ATRIAL FLUTTER, TYPICAL N/A 6/17/2024    Procedure: Ablation, Atrial Flutter, Typical;  Surgeon: Taz Church MD;  Location: [facility] EP LAB;  Service: Cardiology;  Laterality: N/A;  AFL, RFA, LORENE, MAKAYLA (cx if SR), anes, MB, 3 Prep    BREAST BIOPSY Left 2002    core bx, +    BREAST BIOPSY Right 2018    core    BREAST BIOPSY Right 2019    BREAST LUMPECTOMY Left 2002    CYSTOSCOPY  4/28/2022    Procedure: CYSTOSCOPY;  Surgeon: Vik Ware MD;  Location: [facility] OR [address] [place] [address];  Service: Urology;;    CYSTOSCOPY  5/30/2022    Procedure: CYSTOSCOPY;  Surgeon: Bonnie Knutson MD;  Location: [facility] OR [address] [place] [address];  Service: Urology;;    ECHOCARDIOGRAM,TRANSESOPHAGEAL N/A 6/17/2024    Procedure: Transesophageal echo (MAKAYLA) intra-procedure log documentation;  Surgeon: Nestor Martinez MD;  Location: [facility] EP LAB;  Service: Cardiology;  Laterality: N/A;    LASER LITHOTRIPSY  5/30/2022    Procedure: LITHOTRIPSY,  USING LASER;  Surgeon: Bonnie Knutson MD;  [facility];  Service: Urology;;    LITHOTRIPSY      MASTECTOMY Left 06/2002    left-& lymph node dissection    REPLACEMENT OF STENT Right 5/30/2022    Procedure: REPLACEMENT, STENT;  Surgeon: Bonnie Knutson MD;  [facility];  Service: Urology;  Laterality: Right;    RETROGRADE PYELOGRAPHY Right 4/28/2022    Procedure: PYELOGRAM, RETROGRADE;  Surgeon: Vik Ware MD;  [facility];  Service: Urology;  Laterality: Right;    ROBOT-ASSISTED LAPAROSCOPIC PARTIAL NEPHRECTOMY USING DA JESÚS XI Right 3/11/2021    Procedure: XI ROBOTIC NEPHRECTOMY, PARTIAL;  Surgeon: Taz Ortega MD;  Location: [facility] OR 2ND FLR;  Service: Urology;  Laterality: Right;  4hr/ gen with regional  [place] confirmation:  [id number] for 11:15am case NC    URETEROSCOPIC REMOVAL OF URETERIC CALCULUS Right 5/30/2022    Procedure: REMOVAL, CALCULUS, URETER, URETEROSCOPIC;  Surgeon: Bonnie Knutson MD;  Location: [facility] OR [address] [place] [address];  Service: Urology;  Laterality: Right;    ureteroscopy Bilateral     6.14    URETEROSCOPY Right 5/30/2022    Procedure: URETEROSCOPY;  Surgeon: Bonnie Knutson MD;  Location: [facility] OR [address] [place] [address];  Service: Urology;  Laterality: Right;     Family History   Problem Relation Name Age of Onset    Bone cancer Mother Bibi     Breast cancer Mother Bibi     Arthritis Mother Bibi         Breast Cancer    Breast cancer Sister      Cancer Father Edaudi         Asbestos cancer    No Known Problems Brother      Fibroids Daughter      Crohn's disease Daughter      No Known Problems Daughter      Arthritis Sister          Breast Cancer    Anesthesia problems Neg Hx      Glaucoma Neg Hx       Social History     Tobacco Use    Smoking status: Former     Current packs/day: 0.00     Average packs/day: 1 pack/day for 50.0 years (50.0 ttl pk-yrs)     Types: Cigarettes     Start date: 1960     Quit date: 5/20/2009      Years since quitting: 15.1    Smokeless tobacco: Former   Substance Use Topics    Alcohol use: No    Drug use: No     Physical Exam     Initial Vitals [07/26/24 2153]   BP Pulse Resp Temp SpO2   (!) 172/83 76 18 97.8 °F (36.6 °C) 98 %      MAP       --         Physical Exam    Nursing note and vitals reviewed.  Constitutional: She appears well-developed.   Patient noted to be in significant pain, she is moving around the bed in discomfort   HENT:   Head: Normocephalic and atraumatic.   Mouth/Throat: Oropharynx is clear and moist and mucous membranes are normal.   Eyes: EOM are normal. Pupils are equal, round, and reactive to light.   Neck:   Normal range of motion.  Cardiovascular:  Normal rate, regular rhythm and normal heart sounds.           No murmur heard.  Pulmonary/Chest: Breath sounds normal. No respiratory distress. She has no wheezes. She has no rhonchi. She has no rales.   Abdominal:   There is significant tenderness to palpation throughout the patient's abdomen.  There is an increased focality of pain noted in the upper quadrants/epigastric region.   Musculoskeletal:         General: No edema.      Cervical back: Normal range of motion.     Neurological: She is alert and oriented to person, place, and time.   Skin: Skin is warm.   Psychiatric:   She appears upset and anxious but otherwise has normal thought content/behavior         ED Course   Procedures  Labs Reviewed   CBC W/ AUTO DIFFERENTIAL - Abnormal       Result Value    WBC 9.86      RBC 3.11 (*)     Hemoglobin 8.9 (*)     Hematocrit 28.6 (*)     MCV 92      MCH 28.6      MCHC 31.1 (*)     RDW 14.1      Platelets 263      MPV 9.9      Immature Granulocytes 0.6 (*)     Gran # (ANC) 7.3      Immature Grans (Abs) 0.06 (*)     Lymph # 1.3      Mono # 0.9      Eos # 0.3      Baso # 0.06      nRBC 0      Gran % 74.1 (*)     Lymph % 13.2 (*)     Mono % 8.7      Eosinophil % 2.8      Basophil % 0.6      Differential Method Automated     COMPREHENSIVE  METABOLIC PANEL - Abnormal    Sodium 138      Potassium 3.4 (*)     Chloride 102      CO2 24      Glucose 134 (*)     BUN 18      Creatinine 1.2      Calcium 10.4      Total Protein 6.3      Albumin 2.6 (*)     Total Bilirubin 1.1 (*)     Alkaline Phosphatase 162 (*)      (*)     ALT 54 (*)     eGFR 45.8 (*)     Anion Gap 12     B-TYPE NATRIURETIC PEPTIDE - Abnormal     (*)    LIPASE - Abnormal    Lipase >3000 (*)    URINALYSIS, REFLEX TO URINE CULTURE - Abnormal    Specimen UA Urine, Clean Catch      Color, UA Yellow      Appearance, UA Clear      pH, UA 7.0      Specific Gravity, UA 1.015      Protein, UA Negative      Glucose, UA Negative      Ketones, UA Negative      Bilirubin (UA) Negative      Occult Blood UA 3+ (*)     Nitrite, UA Negative      Leukocytes, UA Negative      Narrative:     Specimen Source->Urine   URINALYSIS MICROSCOPIC - Abnormal    RBC, UA >100 (*)     WBC, UA 16 (*)     Hyaline Casts, UA 1      Unclass Yuliana UA 16      Microscopic Comment SEE COMMENT      Narrative:     Specimen Source->Urine   ISTAT PROCEDURE - Abnormal    POC Glucose 150 (*)     POC BUN 17      POC Creatinine 1.5 (*)     POC Sodium 139      POC Potassium 3.2 (*)     POC Chloride 100      POC TCO2 (MEASURED) 27      POC Ionized Calcium 1.26      POC Hematocrit 33 (*)     Sample MARIBELL     CULTURE, URINE   APTT    aPTT 24.9     TROPONIN I    Troponin I 0.024     PROTIME-INR    Prothrombin Time 12.0      INR 1.1     CALCITRIOL   LIPID PANEL   TYPE & SCREEN    Group & Rh O POS      Indirect Tony NEG      Specimen Outdate 07/29/2024 23:59     ISTAT CHEM8     EKG Readings: (Independently Interpreted)   Independently interpreted by MD:  Rate 80, NSR, no stemi, no ectopy, no hypertrophy, right bundle branch block present           Imaging Results               US Abdomen Limited (Final result)  Result time 07/27/24 01:51:07      Final result by Kang Knowles MD (07/27/24 01:51:07)                   Impression:       Cholelithiasis is noted, there is gallbladder wall thickening and mild pericholecystic fluid, the technologist indicates a negative sonographic Hines's sign however indicates the patient did receive pain medication, there is also hypervascularity of the gallbladder wall, correlation for signs or symptoms of acute cholecystitis is therefore needed.    Additional findings as above.    This report was flagged in Epic as abnormal.      Electronically signed by: Kang Knowles  Date:    07/27/2024  Time:    01:51               Narrative:    EXAMINATION:  US ABDOMEN LIMITED    CLINICAL HISTORY:  CT significant for cholelithiasis, however can not exclude cholecystitis.  We would like to further evaluate for possible cholecystitis.;    TECHNIQUE:  Limited ultrasound of the right upper quadrant of the abdomen (including pancreas, liver, gallbladder, common bile duct) was performed.    COMPARISON:  Ultrasound abdomen limited May 12, 2022, CT examination July 26, 2024.    FINDINGS:  There is limited visualization of the pancreas, the pancreatic duct measures 2 mm, not abnormally dilated.    Cholelithiasis is noted, with multiple gallstones of the gallbladder noted.  There is gallbladder wall thickening, measuring up to approximately 5.9 mm, there is mild pericholecystic fluid.  The technologist indicates a negative sonographic Hines's sign, however indicates the patient did receive pain medication which limits the utility of a negative sonographic Hines's sign.  There is appearance of gallbladder wall hypervascularity.    There is no abnormal biliary dilatation.  The common duct measures approximately 5.4 mm near the hepatic hilum.    Echogenic focus of the liver noted, there does appear to be a component of posterior shadowing, this appears peripherally, and note is made of the appearance of a peripheral calcification of the right lobe of the liver on the CT examination.  There is no additional sonographic evidence  for focal hepatic mass lesion on submitted imaging.    Limited imaging of the right kidney demonstrates no evidence for hydronephrosis.                                        CTA Chest Abdomen Pelvis (Final result)  Result time 07/27/24 00:20:07      Final result by Bay Null MD (07/27/24 00:20:07)                   Impression:      CHEST CTA, ABDOMEN CTA, and PELVIS CTA:    Scattered atherosclerotic changes.    Nonocclusive mural thrombus in the distal descending thoracic aorta.    Otherwise, no acute thoracic or abdominal aortic abnormality.    Early and bright enhancement of the engorged and slightly irregular appearing left greater saphenous vein.  Etiology uncertain, clinical correlation is required.  Correlate clinically for any prior more distal lower extremity vascular procedures, such as creation of a dialysis AV fistula.    Imaging findings are most compatible with acute pancreatitis.    There is a pre-existing duodenal diverticulum which is also engulfed by the peripancreatic changes, such that the differential diagnosis may also include acute duodenal diverticulitis.    Cholelithiasis and trace pericholecystic fluid/stranding.  Early acute cholecystitis not excluded.    Although gallstone pancreatitis is not fully excluded, no discrete radiopaque choledocholithiasis is demonstrated by CT.    Short-segment right upper hydroureter immediately proximal to a 5 x 4 x 4 mm right calculus lodged in the lumbar segment of the right ureter. No hydronephrosis.  The larger, more distal right ureteral calculus demonstrated in 2022 is no longer apparent.    Bilateral nonobstructing renal calculi.    A pre-existing extrahepatic portosystemic shunt is again suggested, extending between the splenic vein and distal IVC.  Congenital versus acquired.  Correlate clinically.    Focal hepatic calcification, possibly a granuloma.    Other observations as detailed in the body of the report.    This report was flagged in  Epic as abnormal.      Electronically signed by: Bay Null  Date:    07/27/2024  Time:    00:20               Narrative:    EXAMINATION:  CTA CHEST ABDOMEN PELVIS    CLINICAL HISTORY:  Aortic dissection suspected;    TECHNIQUE:  Angiographic multi detector helical CT imaging of the thoracoabdominal aorta and iliac arteries was performed from the base of the neck through the lesser femoral trochanters before and after the intravenous administration of 100 mL Omnipaque 350.     imaging, 1.25 mm axial images, sagittal coronal two-dimensional multiplanar reformations, and axial lung 3D MIP images were performed    COMPARISON:  Chest x-ray 07/12/2024    Noncontrast CT abdomen pelvis 04/28/2022    Noncontrast chest CT 06/28/2021    CTA chest 06/23/2021.    FINDINGS:  Artifacts related to motion and/or beam hardening degrade numerous images. Allowing for these, the findings are as follows...    CHEST CTA:    THORACIC AORTA: Scattered atherosclerotic changes.  No acute intramural hematoma on noncontrast images.  Postcontrast images demonstrate nonocclusive mural thrombus in the distal descending thoracic aorta.  No real aneurysmal enlargement, acute dissection, or other acute abnormality of the thoracic aorta is demonstrated.    Visualized portions of the brachiocephalic arteries appear grossly patent.    PULMONARY ARTERIES: Allowing for artifacts, no large central pulmonary thromboembolism is demonstrated in the through the level of the proximal portions of the segmental pulmonary artery branches.    THORACIC INLET: 10 mm left thyroid hypodense nodule.  Both thyroid lobes appear somewhat small, possibly on the basis of atrophy.    AIRWAYS: Trachea and visualized central bronchi are patent.    THORACIC ESOPHAGUS: Normal course and caliber.  Small quantity of intraluminal gas scattered throughout the thoracic esophagus, possibly on the basis of gastroesophageal reflux.  A small hiatal hernia is also  suggested.    MEDIASTINUM/ELIZABETH: No mediastinal hematoma, pneumomediastinum, or discrete mediastinal or hilar mass or lymphadenopathy (by size criteria).    HEART/PERICARDIUM: Prominent right atrium.    LUNGS: Upper lobe predominant centrilobular emphysema.  Bibasilar atelectasis or consolidation.  Other underlying pathology not fully excluded.    PLEURA: Small to moderate quantity of bilateral pleural fluid, left slightly larger than the right.    ABDOMEN CTA and PELVIS CTA:    VASCULATURE: Normal course and caliber of the abdominal aorta.    Scattered aortoiliac and branch vessel atherosclerotic changes.    Abdominal aorta, iliac arteries and visualized portions of the femoral arteries appear grossly patent and demonstrate no aneurysmal enlargement.    Engorged and irregular appearance of the brightly enhancing left greater saphenous vein possibly related to postsurgical changes (correlate clinically for any prior left lower extremity vascular surgery; such as a dialysis fistula, perhaps).    Celiac trunk, SMA, QUIRINO, right main renal artery, and left medial artery appear grossly patent.,    Tortuous collateral vein appearing to extend between the splenic vein and distal IVC (immediately above the origin of the left common iliac vein), possibly representing a congenital or acquired/spontaneous extrahepatic portosystemic shunt.  This was already present on 04/28/2022.    The venous system, including the IVC, portal veins, and hepatic veins are poorly enhanced, as expected for arterial phase images such as these.    LIVER: Not enlarged.  Focal calcification, possibly granulomatous.  Otherwise no discrete liver masses are apparent on these arterial phase images.    GALLBLADDER: Distended.  Gallstones.  Trace pericholecystic stranding or fluid in the gallbladder fossa.    BILE DUCTS: No intrahepatic or extrahepatic biliary ductal dilatation.  No radiopaque choledocholithiasis is demonstrated.    PANCREAS: As visualized  on arterial phase images, the pancreas appears atrophic and demonstrates no discrete focal mass.  There is some peripancreatic stranding and edema, greatest about the head and neck, favored to be related to pancreatitis.    SPLEEN: Normal size.  Diffuse heterogeneous parenchymal enhancement; this is likely normal for arterial phase images such as these.    ADRENAL GLANDS: Normal bilaterally.    KIDNEYS: Bilateral renal cortical scarring and at least mild parenchymal thinning.    Small renal parenchymal hypodensities likely represent cysts but are not fully characterized on this scan.    Small nonobstructing bilateral renal calculi.  Lumbar ureter 5 x 4 x 4 mm calculus, with minimal distension of the ureter immediately above the calculus but no right hydronephrosis.    No left hydroureteronephrosis.    No urinary bladder or urethral calculi.    STOMACH: Small hiatal hernia.  The intra-abdominal portion of the stomach is partially distended by intraluminal fluid and gas.  There is some antropyloric gastric wall thickening and surrounding stranding, thought to be secondary to the suspected pancreatitis (though primary gastritis or other primary gastric abnormality is not fully excluded).    DUODENUM: Normal course and caliber of the duodenal C-loop.  Rosa duodenal stranding in fluid likely from the suspected pancreatitis pre-existing duodenal diverticulum, slightly larger than on 04/28/2022, and now containing both intraluminal gas and intraluminal fluid.    SMALL BOWEL: Normal course and caliber    LARGE BOWEL: Normal course and caliber.  Mild diverticulosis coli, without CT evidence of acute diverticulitis.    APPENDIX: The presumed appendix is almost fully collapsed.  No evidence of acute appendicitis.    PERITONEAL CAVITY: No free intraperitoneal air.  No definite free intraperitoneal fluid.    LYMPH NODES: By size criteria, and allowing for artifacts, no abdominal or pelvic lymphadenopathy is  demonstrated.    URINARY BLADDER: Well distended.    REPRODUCTIVE TRACT: The uterus and adnexa are not optimally enhanced on these arterial phase images but demonstrate no acute CT abnormality.    THORACOABDOMINOPELVIC BODY WALL: Skin thickening and subcutaneous stranding in the lateral aspect of upper thighs and flanks bilaterally, possibly related to peripheral edema.  Other soft tissue abnormality not fully excluded    THORACOABDOMINOPELVIC MUSCULOSKELETAL: Diffuse osteopenia.  Scattered degenerative changes.  Allowing for the osteopenia, no discrete acute fracture deformity is demonstrated.    There remains some somewhat serpiginous sclerosis in the anterior aspect of the left femoral head, possibly related to pre-existing osteonecrosis.  No overlying cortical collapse is apparent.     IMAGES: No additional contributory findings.                                       Medications   sodium chloride 0.9% flush 10 mL (has no administration in time range)   naloxone 0.4 mg/mL injection 0.02 mg (has no administration in time range)   glucose chewable tablet 16 g (has no administration in time range)   glucose chewable tablet 24 g (has no administration in time range)   glucagon (human recombinant) injection 1 mg (has no administration in time range)   oxyCODONE immediate release tablet 5 mg (has no administration in time range)   HYDROmorphone injection 0.5 mg (has no administration in time range)   polyethylene glycol packet 17 g (has no administration in time range)   ondansetron injection 4 mg (has no administration in time range)   prochlorperazine injection Soln 5 mg (has no administration in time range)   melatonin tablet 6 mg (has no administration in time range)   simethicone chewable tablet 80 mg (has no administration in time range)   aluminum-magnesium hydroxide-simethicone 200-200-20 mg/5 mL suspension 30 mL (has no administration in time range)   dextrose 10% bolus 125 mL 125 mL (has no  administration in time range)   dextrose 10% bolus 250 mL 250 mL (has no administration in time range)   furosemide tablet 40 mg (has no administration in time range)   metoprolol succinate (TOPROL-XL) 24 hr tablet 100 mg (has no administration in time range)   fluticasone furoate-vilanteroL 100-25 mcg/dose diskus inhaler 1 puff (has no administration in time range)   morphine injection 6 mg (6 mg Intravenous Given 7/26/24 2241)   iohexoL (OMNIPAQUE 350) injection 100 mL (100 mLs Intravenous Given 7/26/24 2249)   lactated ringers bolus 500 mL (0 mLs Intravenous Stopped 7/27/24 0516)   lactated ringers bolus 500 mL (0 mLs Intravenous Stopped 7/27/24 0403)   cefTRIAXone (Rocephin) 1 g in D5W 100 mL IVPB (MB+) (0 g Intravenous Stopped 7/27/24 0546)   HYDROmorphone injection 0.5 mg (0.5 mg Intravenous Given 7/27/24 0515)     Medical Decision Making  Patient was an 78-year-old female who presents due to acute onset epigastric pain radiating to her back.  Upon my initial evaluation of the patient, she was noted to be in significant pain.  Her blood pressure was in normal limits but a mildly widened pulse pressure was noted.  I was able to get good DP on both feet.  Nevertheless, given my high clinical concern for aortic dissection, I immediately placed an IV and requested that she receive a CTA chest abdomen pelvis to evaluate for this.  To further workup my differential, I will obtain basic labs, UA, lipase, coags, type and screen, troponin, BNP, EKG, chest x-ray.  Further differential includes but is not limited to pancreatitis, nephrolithiasis, cholelithiasis, cholecystitis.    CTA was negative for an aortic dissection, however did show pancreatitis as well as cholelithiasis/could not fully rule out cholecystitis.  We will give her a dose of fluids as well as obtain a right upper quadrant ultrasound.    Right upper quadrant ultrasound consistent again with cholelithiasis but also could not rule out cholecystitis.   Lipase found to be significantly elevated to greater than 3000.  In the setting of the cholelithiasis, high clinical concern for gallstone pancreatitis.  However, given that cholecystitis can not be completely ruled out, general surgery consulted for evaluation.    Patient to be admitted to hospital medicine for further management.    Amount and/or Complexity of Data Reviewed  Independent Historian: caregiver  External Data Reviewed: notes.  Labs: ordered.  Radiology: ordered.    Risk  Prescription drug management.  Decision regarding hospitalization.              Attending Attestation:   Physician Attestation Statement for Resident:  As the supervising MD   Physician Attestation Statement: I have personally seen and examined this patient.   I agree with the above history.  -:   As the supervising MD I agree with the above PE.     As the supervising MD I agree with the above treatment, course, plan, and disposition.    I have reviewed and agree with the residents interpretation of the following: lab data, x-rays, CT scans and EKG.  I have reviewed the following: old records at this facility.                                   Clinical Impression:  Final diagnoses:  [M54.9] Back pain  [K85.90] Acute pancreatitis, unspecified complication status, unspecified pancreatitis type (Primary)  [K80.21] Cholelithiasis with biliary obstruction          ED Disposition Condition    Admit Stable                Jamey Carreno MD Resident  07/27/24 8128       Miracle Grijalva MD  07/27/24 1283

## 2024-07-27 NOTE — ASSESSMENT & PLAN NOTE
Patient's COPD is controlled currently.  Patient is currently off COPD Pathway. Continue scheduled inhalers Supplemental oxygen and monitor respiratory status closely.     No wheezing, no s/s respiratory illness. On home 2L NC.     - No Brezztri on formulary, started on breo

## 2024-07-27 NOTE — ED NOTES
Assumed care of patient and received report from GINGER Nguyen. Patient has been identified and correct.     Patient identifiers for Misa Barajas checked and correct.    LOC: The patient is awake, alert and aware of environment with an appropriate affect, the patient is oriented x 4 and speaking appropriately.    APPEARANCE: Patient resting comfortably and in no acute distress, patient is clean and well groomed, patient's clothing is properly fastened.    SKIN: The skin is warm and dry, color consistent with ethnicity, patient has normal skin turgor and moist mucus membranes, skin intact, no breakdown or bruising noted.    MUSCULOSKELETAL: Patient moving all extremities well, no obvious swelling or deformities noted.    RESPIRATORY: Airway is open and patent, respirations are spontaneous and even, patient has a normal effort and rate. 2 L NC at baseline     CARDIAC: Patient has a normal rate and rhythm, +3 edema bilaterally on legs, capillary refill < 3 seconds.     ABDOMEN: Soft and non tender to palpation. Slight distension on abdome,. +abdominal pain and nausea at this time.    NEUROLOGIC: Eyes open spontaneously, PERRL, behavior appropriate to situation, follows commands, facial expression symmetrical, bilateral hand grasp equal and even, purposeful motor response noted, normal sensation in all extremities.     HEENT: No abnormalities noted. White sclera and pupils equal round and reactive to light. Denies headache, dizziness.     : Pt voids independently, denies dysuria, hematuria, frequency.

## 2024-07-27 NOTE — H&P
[facility] - Emergency Dept  Hospital Medicine  History & Physical    Patient Name: Misa Barajas  MRN: [id number]  Patient Class: IP- Inpatient  Admission Date: 7/26/2024  Attending Physician:  Dr. Casanova   Primary Care Provider: Celia Carlisle MD         Patient information was obtained from patient, past medical records, and ER records.     Subjective:     Principal Problem:Acute biliary pancreatitis    Chief Complaint:   Chief Complaint   Patient presents with    Flank Pain     X3 hours that radiates upper back. Had ablation for aflutter 1 month ago, was told to come to the ED if she had any symptoms. Denies CP/SOB. Wears 2L NC at [facility] for COPD         HPI: Misa Barajas is a 78F with a PMHx of HTN, HLD, HFpEF, COPD (on 2L NC baseline O2 sat 92%) and pAF who presented to [facility] 7/26 for acute onset abdominal pain that radiated to her back. States she has RUQ and epigastric pain that started acutely this afternoon. She rates her pain as 10/10 intensity, sharp and non-pleuritic in nature. She reports a past history of nephrolithiasis but states that this pain is different. Pain is not post-prandial in nature and explains that this is differen than her past episodes of nephrolithiasis. SOB started at around thesame time and she normally is on 2L/min of at home oxygen due to her COPD. She states that she was watching TV when the pain suddenly started. She denies nausea, vomiting, fever, chills, or LUTS including hamturia or dysuria. Recent admission 7/12-7/18 for pasturella bactermia d/t cat bite, completed 2wk CTX (EOT 7/26, midline removed 7/26). No recent changes in appetite reported. She also denies diarrhea, constipation.     In ED, Slightly hypertensive 172/83, no tachycardia, on 2L NC saturating 98%, afebrile. CBC with slightly worsened anemia (Hb 8.9, baseline seems approx 10), no leukocytosis. CMP with hypokalemia K 3.4, hypercalcemia (corrected 11.5), Alb 2.6, LFT elevation including T. Bili 1.1,  AST//54, . Lipase >3000. , troponin normal. CTA (obtained for aortic dissection r/o) showed cholelithiasis Distended BG with pericholecystic fluid and cholelithiasis. No intrahepatic or extrahepatic biliary ductal dilatation. CTA showed cholelithiasis so we obtained a right upper quadrant ultrasound as well that confirmed the cholelithiasis and could not necessarily rule out cholecystitis. Gave CTX and 1L IVF.     Past Medical History:   Diagnosis Date    Acute hypoxemic respiratory failure 6/26/2021    Acute superficial venous thrombosis of lower extremity, bilateral 1/25/2022    Anemia 7/12/2024    Aortic atherosclerosis     Arthritis     knee joint pain    Asthma-COPD overlap syndrome 8/22/2022    Breast cancer 2002    left breast & lymph nodes-s/p sx with chemo    Bronchitis     with flu-2/2014    Cataracts, bilateral     Chronic anticoagulation 5/4/2022    Chronic diastolic heart failure 12/24/2019    Chronic kidney disease, stage 3     Class 1 obesity due to excess calories with serious comorbidity and body mass index (BMI) of 30.0 to 30.9 in adult 5/16/2024    History of chemotherapy     last treatment 12/2002 (had 8 treatments)    Hyperlipidemia 11/20/2016    Hypertension     Lymphedema of both lower extremities 1/25/2022    Nephrolithiasis 6/2/2014    Osteoporosis     Paroxysmal atrial fibrillation 6/7/2021    Renal calculi     hematuria    Renal osteodystrophy 1/14/2016    Venous stasis dermatitis of both lower extremities 1/25/2022    Vitamin D insufficiency        Past Surgical History:   Procedure Laterality Date    ABLATION, ATRIAL FLUTTER, TYPICAL N/A 6/17/2024    Procedure: Ablation, Atrial Flutter, Typical;  Surgeon: Taz Church MD;  Location: [facility];  Service: Cardiology;  Laterality: N/A;  AFL, RFA, LORENE, MAKAYLA (cx if SR), anes, MB, 3 Prep    BREAST BIOPSY Left 2002    core bx, +    BREAST BIOPSY Right 2018    core    BREAST BIOPSY Right 2019    BREAST LUMPECTOMY  Left 2002    CYSTOSCOPY  4/28/2022    Procedure: CYSTOSCOPY;  Surgeon: Vik Ware MD;  Location: [facility] OR [facility] FLR;  Service: Urology;;    CYSTOSCOPY  5/30/2022    Procedure: CYSTOSCOPY;  Surgeon: Bonnie Knutson MD;  Location: [facility] OR 1ST FLR;  Service: Urology;;    ECHOCARDIOGRAM,TRANSESOPHAGEAL N/A 6/17/2024    Procedure: Transesophageal echo (MAKAYLA) intra-procedure log documentation;  Surgeon: Nestor Martinez MD;  Location: [facility] EP LAB;  Service: Cardiology;  Laterality: N/A;    LASER LITHOTRIPSY  5/30/2022    Procedure: LITHOTRIPSY, USING LASER;  Surgeon: Bonnie Knutson MD;  [facility];  Service: Urology;;    LITHOTRIPSY      MASTECTOMY Left 06/2002    left-& lymph node dissection    REPLACEMENT OF STENT Right 5/30/2022    Procedure: REPLACEMENT, STENT;  Surgeon: Bonnie Knutson MD;  [facility];  Service: Urology;  Laterality: Right;    RETROGRADE PYELOGRAPHY Right 4/28/2022    Procedure: PYELOGRAM, RETROGRADE;  Surgeon: Vik Ware MD;  [facility];  Service: Urology;  Laterality: Right;    ROBOT-ASSISTED LAPAROSCOPIC PARTIAL NEPHRECTOMY USING DA JESÚS XI Right 3/11/2021    Procedure: XI ROBOTIC NEPHRECTOMY, PARTIAL;  Surgeon: Taz Ortega MD;  Location: [facility] OR 2ND FLR;  Service: Urology;  Laterality: Right;  4hr/ gen with regional  [place] confirmation:  [id number] for 11:15am case NC    URETEROSCOPIC REMOVAL OF URETERIC CALCULUS Right 5/30/2022    Procedure: REMOVAL, CALCULUS, URETER, URETEROSCOPIC;  Surgeon: Bonnie Knutson MD;  Location: [facility] OR 1ST FLR;  Service: Urology;  Laterality: Right;    ureteroscopy Bilateral     6.14    URETEROSCOPY Right 5/30/2022    Procedure: URETEROSCOPY;  Surgeon: Bonnie Knutson MD;  Location: [facility] OR 1ST FLR;  Service: Urology;  Laterality: Right;       Review of patient's allergies indicates:   Allergen Reactions    Adhesive tape-silicones     Adhesive Rash     Pt states  she removed a LATEX bandaid and the skin beneath was swollen and red. No other latex causes a reaction.       No current facility-administered medications on file prior to encounter.     Current Outpatient Medications on File Prior to Encounter   Medication Sig    acetaminophen (TYLENOL) 500 MG tablet Take 2 tablets (1,000 mg total) by mouth every 8 (eight) hours as needed for Pain.    albuterol (PROVENTIL HFA) 90 mcg/actuation inhaler Inhale 2 puffs into the lungs every 6 (six) hours as needed for Wheezing. Rescue    apixaban (ELIQUIS) 2.5 mg Tab Take 1 tablet (2.5 mg total) by mouth 2 (two) times a day.    aspirin (ECOTRIN) 81 MG EC tablet Take 81 mg by mouth once daily.    biotin 1 mg tablet Take 1,000 mcg by mouth once daily.    budesonide-glycopyr-formoterol (BREZTRI AEROSPHERE) 160-9-4.8 mcg/actuation HFAA Inhale 2 puffs into the lungs 2 (two) times daily.    busPIRone (BUSPAR) 5 MG Tab Take 5 mg by mouth as needed.    [] D5W PgBk 100 mL with cefTRIAXone 2 gram SolR 2 g Inject 2 g into the vein once daily. for 9 days    fluticasone propionate (FLONASE) 50 mcg/actuation nasal spray 1 spray by Each Nostril route daily as needed (congestion).    furosemide (LASIX) 40 MG tablet Take 1 tablet (40 mg total) by mouth once daily.    inhalation spacing device Use as directed for inhalation.    metoprolol succinate (TOPROL-XL) 100 MG 24 hr tablet Take 1 tablet (100 mg total) by mouth once daily.    vitamin D (VITAMIN D3) 1000 units Tab Take 2 tablets (2,000 Units total) by mouth once daily.     Family History       Problem Relation (Age of Onset)    Arthritis Mother, Sister    Bone cancer Mother    Breast cancer Mother, Sister    Cancer Father    Crohn's disease Daughter    Fibroids Daughter    No Known Problems Brother, Daughter          Tobacco Use    Smoking status: Former     Current packs/day: 0.00     Average packs/day: 1 pack/day for 50.0 years (50.0 ttl pk-yrs)     Types: Cigarettes     Start date:       Quit date: 5/20/2009     Years since quitting: 15.1    Smokeless tobacco: Former   Substance and Sexual Activity    Alcohol use: No    Drug use: No    Sexual activity: Not Currently     Partners: Male     Birth control/protection: Partner-Vasectomy     Review of Systems   Constitutional:  Negative for activity change, appetite change, chills and fever.   HENT:  Negative for congestion and drooling.    Eyes:  Negative for discharge and itching.   Respiratory:  Negative for cough, chest tightness and shortness of breath.    Cardiovascular:  Negative for chest pain and palpitations.   Gastrointestinal:  Positive for abdominal pain. Negative for abdominal distention, nausea and vomiting.   Genitourinary:  Negative for dysuria and frequency.   Musculoskeletal:  Negative for arthralgias and joint swelling.   Neurological:  Negative for dizziness and light-headedness.   Psychiatric/Behavioral:  Negative for agitation and behavioral problems.      Objective:     Vital Signs (Most Recent):  Temp: 97.8 °F (36.6 °C) (07/26/24 2153)  Pulse: 96 (07/27/24 0008)  Resp: (!) 22 (07/27/24 0008)  BP: (!) 140/61 (07/27/24 0008)  SpO2: 98 % (07/26/24 2153) Vital Signs (24h Range):  Temp:  [97.8 °F (36.6 °C)-98.1 °F (36.7 °C)] 97.8 °F (36.6 °C)  Pulse:  [65-96] 96  Resp:  [18-24] 22  SpO2:  [97 %-98 %] 98 %  BP: (128-175)/(61-83) 140/61        There is no height or weight on file to calculate BMI.     Physical Exam  Constitutional:       General: She is not in acute distress.     Appearance: Normal appearance. She is not ill-appearing.   HENT:      Head: Normocephalic and atraumatic.      Right Ear: External ear normal.      Left Ear: External ear normal.      Nose: Nose normal.      Mouth/Throat:      Mouth: Mucous membranes are moist.      Pharynx: Oropharynx is clear.   Eyes:      Extraocular Movements: Extraocular movements intact.      Conjunctiva/sclera: Conjunctivae normal.   Cardiovascular:      Rate and Rhythm: Normal rate  and regular rhythm.      Pulses: Normal pulses.      Heart sounds: Normal heart sounds.   Pulmonary:      Effort: Pulmonary effort is normal.      Breath sounds: Normal breath sounds.   Abdominal:      General: Abdomen is flat. There is no distension.      Palpations: Abdomen is soft.      Tenderness: There is abdominal tenderness. There is no rebound.      Comments: Lying flat and still, small movement exacerbates abdominal pain  Soft, no rebound, voluntary guarding  Generalized TTP, most painful at RUQ and epigastrium   Musculoskeletal:         General: No swelling. Normal range of motion.      Cervical back: Normal range of motion and neck supple.   Skin:     General: Skin is warm and dry.      Capillary Refill: Capillary refill takes less than 2 seconds.   Neurological:      General: No focal deficit present.      Mental Status: She is alert and oriented to person, place, and time.   Psychiatric:         Mood and Affect: Mood normal.         Behavior: Behavior normal.                Significant Labs: All pertinent labs within the past 24 hours have been reviewed.  CBC:   Recent Labs   Lab 07/26/24 2244 07/26/24 2317   WBC  --  9.86   HGB  --  8.9*   HCT 33* 28.6*   PLT  --  263     CMP:   Recent Labs   Lab 07/26/24 2317      K 3.4*      CO2 24   *   BUN 18   CREATININE 1.2   CALCIUM 10.4   PROT 6.3   ALBUMIN 2.6*   BILITOT 1.1*   ALKPHOS 162*   *   ALT 54*   ANIONGAP 12     Cardiac Markers:   Recent Labs   Lab 07/26/24 2317   *     Lipase:   Recent Labs   Lab 07/26/24 2317   LIPASE >3000*     Troponin:   Recent Labs   Lab 07/26/24 2317   TROPONINI 0.024       Significant Imaging: I have reviewed all pertinent imaging results/findings within the past 24 hours.  Assessment/Plan:     * Acute biliary pancreatitis  Sudden onset abdominal pain not a/w eating c/f gallstone pancreatitis (given cholelithiasis seen on CTA and US). No fever, chills, or other s/s infection to suggest  cholangitis. Does have elevated T bili and AST/ALT. Severe abdominal pain. No nausea or vomiting. No acute alcohol intake. Slight hypercalcemia of unknown etiology, however not profound enough to explain acute pancreatitis. Lipase >3000. CT AP with peripancreatic stranding and edema, greatest about the head and neck, favored to be related to pancreatitis. Location of pain plus CT findings and lab findings do not suggest nephrolithiasis as etiology.     - AES consulted given LFT elevation, appreciate recommendations  - MRCP to better delineate biliary obstruction  - No antibiotics as low concern for cholangitis, would start Zosyn if febrile or becoming hypotensive  - NPO  - cautious with fluids given CHF (no signs of decompensation, got 1L in ED)  - analgesic therapy  - Will need GS consult (can wait until AES eval) for elective lap asad to prevent recurrence      COPD (chronic obstructive pulmonary disease)  Patient's COPD is controlled currently.  Patient is currently off COPD Pathway. Continue scheduled inhalers Supplemental oxygen and monitor respiratory status closely.     No wheezing, no s/s respiratory illness. On home 2L NC.     - No Brezztri on formulary, started on breo    Chronic diastolic heart failure  No signs of decompensation. Stable RLE edema. No orthopnea, no rales.    - Continue home lasix and metoprolol      Paroxysmal atrial fibrillation  Patient with Paroxysmal (<7 days) atrial fibrillation which is controlled currently with Beta Blocker. Patient is currently in sinus rhythm.[id number] Score: 3. Anticoagulation not indicated due to possible procedure .    CKD (chronic kidney disease) stage 3, GFR 30-59 ml/min  Creatine stable for now. BMP reviewed- noted CrCl cannot be calculated (Unknown ideal weight.). according to latest data. Based on current GFR, CKD stage is stage 3 - GFR 30-59.  Monitor UOP and serial BMP and adjust therapy as needed. Renally dose meds. Avoid nephrotoxic medications and  procedures.    Secondary hyperparathyroidism of renal origin  Causing slight hypercalcemia.     - Check calcitriol   - CTM CMP      Essential hypertension  Chronic, uncontrolled. Latest blood pressure and vitals reviewed-     Temp:  [97.8 °F (36.6 °C)-98.1 °F (36.7 °C)]   Pulse:  [65-96]   Resp:  [18-24]   BP: (128-175)/(61-83)   SpO2:  [97 %-98 %] .   Home meds for hypertension were reviewed and noted below.   Hypertension Medications               furosemide (LASIX) 40 MG tablet Take 1 tablet (40 mg total) by mouth once daily.    metoprolol succinate (TOPROL-XL) 100 MG 24 hr tablet Take 1 tablet (100 mg total) by mouth once daily.            While in the hospital, will manage blood pressure as follows; Continue home antihypertensive regimen    Will utilize p.r.n. blood pressure medication only if patient's blood pressure greater than 220/110 and she develops symptoms such as worsening chest pain or shortness of breath.      VTE Risk Mitigation (From admission, onward)           Ordered     Reason for No Pharmacological VTE Prophylaxis  Once        Comments: Anticipate possible procerdure   Question:  Reasons:  Answer:  Physician Provided (leave comment)    07/27/24 0430     IP VTE HIGH RISK PATIENT  Once         07/27/24 0430     Place sequential compression device  Until discontinued         07/27/24 0430                                    Miracle Naik MD  Department of Hospital Medicine  [facility] - Emergency Dept

## 2024-07-27 NOTE — PLAN OF CARE
Problem: Adult Inpatient Plan of Care  Goal: Plan of Care Review  7/27/2024 0731 by Eryn Flanagan, RN  Outcome: Progressing     Problem: Adult Inpatient Plan of Care  Goal: Patient-Specific Goal (Individualized)  7/27/2024 0731 by Eryn Flanagan, RN  Outcome: Progressing     Problem: Wound  Goal: Optimal Coping  Outcome: Progressing     Problem: Pain Acute  Goal: Optimal Pain Control and Function  Outcome: Progressing     Problem: Infection  Goal: Absence of Infection Signs and Symptoms  Outcome: Progressing      AAOX4. Pt denies pain but  c/o nausea. Bed locked and in lowest position, call light within reach, will continue to monitor

## 2024-07-27 NOTE — HPI
Misa Barajas is a 78F with a PMHx of HTN, HLD, HFpEF, COPD (on 2L NC baseline O2 sat 92%) and pAF who presented to [facility] 7/26 for acute onset abdominal pain that radiated to her back. States she has RUQ and epigastric pain that started acutely this afternoon. She rates her pain as 10/10 intensity, sharp and non-pleuritic in nature. She reports a past history of nephrolithiasis but states that this pain is different. Pain is not post-prandial in nature and explains that this is differen than her past episodes of nephrolithiasis. SOB started at around thesame time and she normally is on 2L/min of at home oxygen due to her COPD. She states that she was watching TV when the pain suddenly started. She denies nausea, vomiting, fever, chills, or LUTS including hamturia or dysuria. Recent admission 7/12-7/18 for pasturella bactermia d/t cat bite, completed 2wk CTX (EOT 7/26, midline removed 7/26). No recent changes in appetite reported. She also denies diarrhea, constipation.     In ED, Slightly hypertensive 172/83, no tachycardia, on 2L NC saturating 98%, afebrile. CBC with slightly worsened anemia (Hb 8.9, baseline seems approx 10), no leukocytosis. CMP with hypokalemia K 3.4, hypercalcemia (corrected 11.5), Alb 2.6, LFT elevation including T. Bili 1.1, AST//54, . Lipase >3000. , troponin normal. CTA (obtained for aortic dissection r/o) showed cholelithiasis Distended BG with pericholecystic fluid and cholelithiasis. No intrahepatic or extrahepatic biliary ductal dilatation. CTA showed cholelithiasis so we obtained a right upper quadrant ultrasound as well that confirmed the cholelithiasis and could not necessarily rule out cholecystitis. Gave CTX and 1L IVF.

## 2024-07-27 NOTE — ASSESSMENT & PLAN NOTE
Chronic, uncontrolled. Latest blood pressure and vitals reviewed-     Temp:  [97.8 °F (36.6 °C)-98.1 °F (36.7 °C)]   Pulse:  [65-96]   Resp:  [18-24]   BP: (128-175)/(61-83)   SpO2:  [97 %-98 %] .   Home meds for hypertension were reviewed and noted below.   Hypertension Medications               furosemide (LASIX) 40 MG tablet Take 1 tablet (40 mg total) by mouth once daily.    metoprolol succinate (TOPROL-XL) 100 MG 24 hr tablet Take 1 tablet (100 mg total) by mouth once daily.            While in the hospital, will manage blood pressure as follows; Continue home antihypertensive regimen    Will utilize p.r.n. blood pressure medication only if patient's blood pressure greater than 220/110 and she develops symptoms such as worsening chest pain or shortness of breath.

## 2024-07-27 NOTE — ASSESSMENT & PLAN NOTE
Patient with Paroxysmal (<7 days) atrial fibrillation which is controlled currently with Beta Blocker. Patient is currently in sinus rhythm.[id number] Score: 3. Anticoagulation not indicated due to possible procedure .

## 2024-07-27 NOTE — CONSULTS
NIAS at bedside to eval pt for IV access. Pt has adequate access at this time. Re consult if needed.

## 2024-07-27 NOTE — PHARMACY MED REC
"  Admission Medication History     The home medication history was taken by Oksana Ureña.    You may go to "Admission" then "Reconcile Home Medications" tabs to review and/or act upon these items.     The home medication list has been updated by the Pharmacy department.   Please read ALL comments highlighted in yellow.   Please address this information as you see fit.    Feel free to contact us if you have any questions or require assistance.      The medications listed below were removed from the home medication list. Please reorder if appropriate:  Patient reports no longer taking the following medication(s):  Rosuvastatin (Crestor) 5 mg tablet   Tamsulosin (Flomax) 0.4 mg capsule   Losartan ( Cozaar) 50 mg tablet   Empagliflozin ( Jardiance) 10 mg tablet     Medications listed below were obtained from: Patient/family and Analytic software- [employer] Medications   Medication Sig    acetaminophen (TYLENOL) 500 MG tablet   Take 1,000 mg by mouth daily as needed for Pain.)    albuterol (PROVENTIL HFA) 90 mcg/actuation inhaler  Inhale 2 puffs into the lungs daily as needed for Wheezing. Rescue      apixaban (ELIQUIS) 2.5 mg Tab Take 1 tablet (2.5 mg total) by mouth 2 (two) times a day.      aspirin (ECOTRIN) 81 MG EC tablet   Take 81 mg by mouth once daily.    biotin 1 mg tablet   Take 1,000 mcg by mouth once daily.    budesonide-glycopyr-formoterol (BREZTRI AEROSPHERE) 160-9-4.8 mcg/actuation HFAA   Inhale 2 puffs into the lungs 2 (two) times daily.    busPIRone (BUSPAR) 5 MG Tab   Take 5 mg by mouth daily as needed (anxiety).    fluticasone propionate (FLONASE) 50 mcg/actuation nasal spray   1 spray by Each Nostril route daily as needed for Rhinitis or Allergies.    furosemide (LASIX) 40 MG tablet   Take 1 tablet (40 mg total) by mouth once daily.    metoprolol succinate (TOPROL-XL) 100 MG 24 hr tablet   Take 1 tablet (100 mg total) by mouth once daily.    vitamin D (VITAMIN D3) 1000 units Tab   Take 1,000 Units " by mouth once daily.)     Oksana Porrasfect  EXT [id number]               .

## 2024-07-27 NOTE — ASSESSMENT & PLAN NOTE
Sudden onset abdominal pain not a/w eating c/f gallstone pancreatitis (given cholelithiasis seen on CTA and US). No fever, chills, or other s/s infection to suggest cholangitis. Does have elevated T bili and AST/ALT. Severe abdominal pain. No nausea or vomiting. No acute alcohol intake. Slight hypercalcemia of unknown etiology, however not profound enough to explain acute pancreatitis. Lipase >3000. CT AP with peripancreatic stranding and edema, greatest about the head and neck, favored to be related to pancreatitis. Location of pain plus CT findings and lab findings do not suggest nephrolithiasis as etiology.     - AES consulted given LFT elevation, appreciate recommendations  - MRCP to better delineate biliary obstruction  - No antibiotics as low concern for cholangitis, would start Zosyn if febrile or becoming hypotensive  - NPO  - cautious with fluids given CHF (no signs of decompensation, got 1L in ED)  - analgesic therapy  - Will need GS consult (can wait until AES eval) for elective lap asad to prevent recurrence

## 2024-07-27 NOTE — ASSESSMENT & PLAN NOTE
78 yoF with biliary pancreatitis. AES consulted, MRCP ordered. Last dose of Eliquis was morning of 7/26. Gallbladder wall thickening likely secondary to pancreatitis and prior episodes of cholecystitis given history. We discussed the indication for same stay cholecystectomy in order to prevent future episodes of gallstone pancreatitis. We also discussed the increased risk of surgery given her co morbidities and the potential decreased risk of recurrence if ERCP with sphincterotomy were to be performed.    - General Surgery to follow for potential same stay cholecystectomy  - Follow up MRCP and AES recommendations  - Hold Eliquis  - Trend CBC, CMP, and lipase  - Serial abdominal exams

## 2024-07-27 NOTE — SUBJECTIVE & OBJECTIVE
No current facility-administered medications on file prior to encounter.     Current Outpatient Medications on File Prior to Encounter   Medication Sig    acetaminophen (TYLENOL) 500 MG tablet Take 2 tablets (1,000 mg total) by mouth every 8 (eight) hours as needed for Pain.    albuterol (PROVENTIL HFA) 90 mcg/actuation inhaler Inhale 2 puffs into the lungs every 6 (six) hours as needed for Wheezing. Rescue    apixaban (ELIQUIS) 2.5 mg Tab Take 1 tablet (2.5 mg total) by mouth 2 (two) times a day.    aspirin (ECOTRIN) 81 MG EC tablet Take 81 mg by mouth once daily.    biotin 1 mg tablet Take 1,000 mcg by mouth once daily.    budesonide-glycopyr-formoterol (BREZTRI AEROSPHERE) 160-9-4.8 mcg/actuation HFAA Inhale 2 puffs into the lungs 2 (two) times daily.    busPIRone (BUSPAR) 5 MG Tab Take 5 mg by mouth as needed.    [] D5W PgBk 100 mL with cefTRIAXone 2 gram SolR 2 g Inject 2 g into the vein once daily. for 9 days    fluticasone propionate (FLONASE) 50 mcg/actuation nasal spray 1 spray by Each Nostril route daily as needed (congestion).    furosemide (LASIX) 40 MG tablet Take 1 tablet (40 mg total) by mouth once daily.    inhalation spacing device Use as directed for inhalation.    metoprolol succinate (TOPROL-XL) 100 MG 24 hr tablet Take 1 tablet (100 mg total) by mouth once daily.    vitamin D (VITAMIN D3) 1000 units Tab Take 2 tablets (2,000 Units total) by mouth once daily.       Review of patient's allergies indicates:   Allergen Reactions    Adhesive tape-silicones     Adhesive Rash     Pt states she removed a LATEX bandaid and the skin beneath was swollen and red. No other latex causes a reaction.       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    Breast cancer 2002    left breast & lymph nodes-s/p sx with chemo    Bronchitis     with flu-2/2014    Cataracts, bilateral     Chronic anticoagulation 5/4/2022    Chronic diastolic heart failure 12/24/2019    Chronic kidney disease, stage 3     Class 1 obesity due to excess calories with serious comorbidity and body mass index (BMI) of 30.0 to 30.9 in adult 5/16/2024    History of chemotherapy     last treatment 12/2002 (had 8 treatments)    Hyperlipidemia 11/20/2016    Hypertension     Lymphedema of both lower extremities 1/25/2022    Nephrolithiasis 6/2/2014    Osteoporosis     Paroxysmal atrial fibrillation 6/7/2021    Renal calculi     hematuria    Renal osteodystrophy 1/14/2016    Venous stasis dermatitis of both lower extremities 1/25/2022    Vitamin D insufficiency      Past Surgical History:   Procedure Laterality Date    ABLATION, ATRIAL FLUTTER, TYPICAL N/A 6/17/2024    Procedure: Ablation, Atrial Flutter, Typical;  Surgeon: Taz Church MD;  Location: [facility] EP LAB;  Service: Cardiology;  Laterality: N/A;  AFL, RFA, LORENE, MAKAYLA (cx if SR), anes, MB, 3 Prep    BREAST BIOPSY Left 2002    core bx, +    BREAST BIOPSY Right 2018    core    BREAST BIOPSY Right 2019    BREAST LUMPECTOMY Left 2002    CYSTOSCOPY  4/28/2022    Procedure: CYSTOSCOPY;  Surgeon: Vik Ware MD;  Location: [facility] OR [address] [place] [address];  Service: Urology;;    CYSTOSCOPY  5/30/2022    Procedure: CYSTOSCOPY;  Surgeon: Bonnie Knutson MD;  Location: [facility] OR [address] [place] [address];  Service: Urology;;    ECHOCARDIOGRAM,TRANSESOPHAGEAL N/A 6/17/2024    Procedure: Transesophageal echo (MAKAYLA) intra-procedure log documentation;  Surgeon: Nestor Martinez MD;  Location: [facility] EP LAB;  Service: Cardiology;  Laterality: N/A;    LASER LITHOTRIPSY  5/30/2022    Procedure: LITHOTRIPSY, USING LASER;  Surgeon: Bonnie Knutson MD;  Location: [facility] OR [address] [place] [address];  Service: Urology;;    LITHOTRIPSY      MASTECTOMY Left 06/2002     left-& lymph node dissection    REPLACEMENT OF STENT Right 5/30/2022    Procedure: REPLACEMENT, STENT;  Surgeon: Bonnie Knutson MD;  Location: [facility] OR 1ST FLR;  Service: Urology;  Laterality: Right;    RETROGRADE PYELOGRAPHY Right 4/28/2022    Procedure: PYELOGRAM, RETROGRADE;  Surgeon: Vik Ware MD;  Location: [facility] OR 1ST FLR;  Service: Urology;  Laterality: Right;    ROBOT-ASSISTED LAPAROSCOPIC PARTIAL NEPHRECTOMY USING DA JESÚS XI Right 3/11/2021    Procedure: XI ROBOTIC NEPHRECTOMY, PARTIAL;  Surgeon: Taz Ortega MD;  Location: [facility] OR 2ND FLR;  Service: Urology;  Laterality: Right;  4hr/ gen with regional  Fort confirmation:  [id number] for 11:15am case NC    URETEROSCOPIC REMOVAL OF URETERIC CALCULUS Right 5/30/2022    Procedure: REMOVAL, CALCULUS, URETER, URETEROSCOPIC;  Surgeon: Bonnie Knutson MD;  [facility];  Service: Urology;  Laterality: Right;    ureteroscopy Bilateral     6.14    URETEROSCOPY Right 5/30/2022    Procedure: URETEROSCOPY;  Surgeon: Bonnie Knutson MD;  [facility];  Service: Urology;  Laterality: Right;     Family History       Problem Relation (Age of Onset)    Arthritis Mother, Sister    Bone cancer Mother    Breast cancer Mother, Sister    Cancer Father    Crohn's disease Daughter    Fibroids Daughter    No Known Problems Brother, Daughter          Tobacco Use    Smoking status: Former     Current packs/day: 0.00     Average packs/day: 1 pack/day for 50.0 years (50.0 ttl pk-yrs)     Types: Cigarettes     Start date: 1960     Quit date: 5/20/2009     Years since quitting: 15.1    Smokeless tobacco: Former   Substance and Sexual Activity    Alcohol use: No    Drug use: No    Sexual activity: Not Currently     Partners: Male     Birth control/protection: Partner-Vasectomy     Review of Systems   Constitutional:  Negative for chills and fever.   Respiratory:  Negative for cough and shortness of breath.     Cardiovascular:  Negative for chest pain and palpitations.   Gastrointestinal:  Positive for abdominal pain and nausea. Negative for vomiting.   Genitourinary:  Negative for dysuria.   Neurological:  Negative for dizziness and light-headedness.     Objective:     Vital Signs (Most Recent):  Temp: 98.4 °F (36.9 °C) (07/27/24 1123)  Pulse: 77 (07/27/24 1144)  Resp: 18 (07/27/24 1123)  BP: (!) 146/83 (07/27/24 0730)  SpO2: (!) 90 % (07/27/24 1123) Vital Signs (24h Range):  Temp:  [97.7 °F (36.5 °C)-98.4 °F (36.9 °C)] 98.4 °F (36.9 °C)  Pulse:  [] 77  Resp:  [12-24] 18  SpO2:  [90 %-98 %] 90 %  BP: (140-175)/(61-83) 146/83     Weight: 77.1 kg (169 lb 15.6 oz)  Body mass index is 29.18 kg/m².     Physical Exam  Vitals reviewed.   Constitutional:       Appearance: Normal appearance.   Cardiovascular:      Rate and Rhythm: Normal rate.      Pulses: Normal pulses.   Pulmonary:      Effort: No respiratory distress.   Abdominal:      General: There is no distension.      Palpations: Abdomen is soft. There is no mass.      Tenderness: There is abdominal tenderness. There is no guarding.      Comments: Diffuse, mild tenderness, worst in the periumbilical region   Skin:     General: Skin is warm and dry.   Neurological:      General: No focal deficit present.      Mental Status: She is alert and oriented to person, place, and time.            I have reviewed all pertinent lab results within the past 24 hours.  CBC:   Recent Labs   Lab 07/26/24  2317   WBC 9.86   RBC 3.11*   HGB 8.9*   HCT 28.6*      MCV 92   MCH 28.6   MCHC 31.1*     CMP:   Recent Labs   Lab 07/26/24  2317   *   CALCIUM 10.4   ALBUMIN 2.6*   PROT 6.3      K 3.4*   CO2 24      BUN 18   CREATININE 1.2   ALKPHOS 162*   ALT 54*   *   BILITOT 1.1*       Significant Diagnostics:  I have reviewed all pertinent imaging results/findings within the past 24 hours.

## 2024-07-27 NOTE — HPI
Ms. Barajas is a pleasant 78 yoF with complex PMH including HTN, HLD, HFpEF, COPD (on 2L NC baseline O2 sat 92%) and pAF (Eliquis, last dose 7/26) who presented to the ED with severe periumbilical abdominal pain that radiated to her back. Workup in the ED demonstrated stable vital signs on 2L NC, lab work with Lipase > 3000, , and Tbili 1.1 with mildly elevated LFTs and normal WBC. Imaging demonstrated thickening of her gallbladder wall with trace pericholecystic fluid, gallstones, and no biliary dilation. AES and General Surgery consulted.    This morning her pain his improved, but still present. She denies nausea currently, but does endorse a long history of recurrent episodes of nausea. She denies ever having pain like this before. No fevers or chills. She is on home O2, but is able to complete ADLs around the house without assistance. Her daughter lives down the street and helps out from time to time. She is unable to ambulate a city block or climb a flight of stairs. METS < 4. Abdominal surgical history significant for a laparoscopic right partial nephrectomy in 2021.

## 2024-07-27 NOTE — ASSESSMENT & PLAN NOTE
No signs of decompensation. Stable RLE edema. No orthopnea, no rales.    - Continue home lasix and metoprolol

## 2024-07-27 NOTE — PLAN OF CARE
[facility] ([place] [address])  Initial Discharge Assessment       Primary Care Provider: Celia Carlisle MD    Admission Diagnosis: Back pain [M54.9]  Chest pain [R07.9]  Acute pancreatitis, unspecified complication status, unspecified pancreatitis type [K85.90]    Admission Date: 7/26/2024  Expected Discharge Date: 7/31/2024    Transition of Care Barriers: (P) None    Payor: [employer] MEDICARE ADVANTAGE / Plan: [email or web address] H[employer] MCARE ADV / Product Type: Medicare Advantage /     Extended Emergency Contact Information  Primary Emergency Contact: Marina Gruber  Address: [address] [place] [address] [id number] [place] [address]  Mobile Phone: [phone]  Relation: Daughter  Secondary Emergency Contact: Esther Barajas   [place]  Mobile Phone: [phone]  Relation: Daughter    Discharge Plan A: (P) Home with family, Home Health  Discharge Plan B: (P) Home with family      [facility]  [address] [facility] [address]  [facility] [address]  Phone: [phone] Fax: [phone]      Initial Assessment (most recent)       Adult Discharge Assessment - 07/27/24 1631          Discharge Assessment    Assessment Type Discharge Planning Assessment     Confirmed/corrected address, phone number and insurance Yes     Confirmed Demographics Correct on Facesheet     Source of Information patient;family     Reason For Admission back pain     People in Home alone   Socrates Gray lives one door down from patient    Prior to hospitilization cognitive status: Alert/Oriented     Current cognitive status: Alert/Oriented     Walking or Climbing Stairs Difficulty yes     Walking or Climbing Stairs ambulation difficulty, requires equipment     Mobility Management rollator     Dressing/Bathing Difficulty no     Equipment Currently Used at Home rollator;oxygen     Readmission within 30 days? Yes     Patient currently being followed by outpatient case management? No     Do you currently have  service(s) that help you manage your care at home? Yes     Name and Contact number of agency [facility]     Is the pt/caregiver preference to resume services with current agency Yes     Do you take prescription medications? Yes     Do you have prescription coverage? Yes     Coverage [employer] MEDICARE ADVANTAGE - [facility] ADV - (P)      Do you have any problems affording any of your prescribed medications? No     Is the patient taking medications as prescribed? yes     Who is going to help you get home at discharge? Marina Gruber (Daughter)  [phone]     How do you get to doctors appointments? family or friend will provide (P)      Are you on dialysis? No (P)      Do you take coumadin? No (P)      Discharge Plan A Home with family;Home Health (P)      Discharge Plan B Home with family (P)      DME Needed Upon Discharge  none (P)      Discharge Plan discussed with: Patient;Adult children (P)      Transition of Care Barriers None (P)         Financial Resource Strain    How hard is it for you to pay for the very basics like food, housing, medical care, and heating? Not very hard (P)         Housing Stability    In the last 12 months, was there a time when you were not able to pay the mortgage or rent on time? No (P)      At any time in the past 12 months, were you homeless or living in a shelter (including now)? No (P)         Transportation Needs    Has the lack of transportation kept you from medical appointments, meetings, work or from getting things needed for daily living? No (P)         Food Insecurity    Within the past 12 months, you worried that your food would run out before you got the money to buy more. Never true (P)      Within the past 12 months, the food you bought just didn't last and you didn't have money to get more. Never true (P)         Stress    Do you feel stress - tense, restless, nervous, or anxious, or unable to sleep at night because your mind is troubled all the time -  "these days? Only a little (P)         Social Isolation    How often do you feel lonely or isolated from those around you?  Never (P)         Alcohol Use    Q1: How often do you have a drink containing alcohol? Never (P)      Q2: How many drinks containing alcohol do you have on a typical day when you are drinking? Patient does not drink (P)      Q3: How often do you have six or more drinks on one occasion? Never (P)         Utilities    In the past 12 months has the electric, gas, oil, or water company threatened to shut off services in your home? No (P)         Health Literacy    How often do you need to have someone help you when you read instructions, pamphlets, or other written material from your doctor or pharmacy? Sometimes (P)                      Readmission Assessment (most recent)       Readmission Assessment - 07/27/24 1630          Readmission    Was this a planned readmission? No     Why were you hospitalized in the last 30 days? abnormal lab work     Why were you readmitted? New medical problem     When you left the hospital how did you feel? "felt ok"     When you left the hospital where did you go? Home with Home Health     Did patient/caregiver refused recommended DC plan? No     Tell me about what happened between when you left the hospital and the day you returned. Dc'd home w/ HH, readmitted due to back pain     When did you start not feeling well? about 1 week later     Did you have  a follow-up appointment on discharge? Yes     Did you go? Yes                 Discharge Plan A and Plan B have been determined by review of patient's clinical status, future medical and therapeutic needs, and coverage/benefits for post-acute care in coordination with multidisciplinary team members.                       SULEIMAN Phillips, LMSW  [facility]  Case Management  Ext. [phone]           "

## 2024-07-27 NOTE — SUBJECTIVE & OBJECTIVE
Past Medical History:   Diagnosis Date    Acute hypoxemic respiratory failure 6/26/2021    Acute superficial venous thrombosis of lower extremity, bilateral 1/25/2022    Anemia 7/12/2024    Aortic atherosclerosis     Arthritis     knee joint pain    Asthma-COPD overlap syndrome 8/22/2022    Breast cancer 2002    left breast & lymph nodes-s/p sx with chemo    Bronchitis     with flu-2/2014    Cataracts, bilateral     Chronic anticoagulation 5/4/2022    Chronic diastolic heart failure 12/24/2019    Chronic kidney disease, stage 3     Class 1 obesity due to excess calories with serious comorbidity and body mass index (BMI) of 30.0 to 30.9 in adult 5/16/2024    History of chemotherapy     last treatment 12/2002 (had 8 treatments)    Hyperlipidemia 11/20/2016    Hypertension     Lymphedema of both lower extremities 1/25/2022    Nephrolithiasis 6/2/2014    Osteoporosis     Paroxysmal atrial fibrillation 6/7/2021    Renal calculi     hematuria    Renal osteodystrophy 1/14/2016    Venous stasis dermatitis of both lower extremities 1/25/2022    Vitamin D insufficiency        Past Surgical History:   Procedure Laterality Date    ABLATION, ATRIAL FLUTTER, TYPICAL N/A 6/17/2024    Procedure: Ablation, Atrial Flutter, Typical;  Surgeon: Taz Church MD;  Location: [facility] EP LAB;  Service: Cardiology;  Laterality: N/A;  AFL, RFA, LORENE, MAKAYLA (cx if SR), LEANNE miller, 3 Prep    BREAST BIOPSY Left 2002    core bx, +    BREAST BIOPSY Right 2018    core    BREAST BIOPSY Right 2019    BREAST LUMPECTOMY Left 2002    CYSTOSCOPY  4/28/2022    Procedure: CYSTOSCOPY;  Surgeon: Vik Ware MD;  Location: [facility] OR [address] [place] [address];  Service: Urology;;    CYSTOSCOPY  5/30/2022    Procedure: CYSTOSCOPY;  Surgeon: Bonnie Knutson MD;  Location: [facility] OR [address] [place] [address];  Service: Urology;;    ECHOCARDIOGRAM,TRANSESOPHAGEAL N/A 6/17/2024    Procedure: Transesophageal echo (MAKAYLA) intra-procedure log documentation;  Surgeon: Nestor Martinez MD;   Location: [facility] EP LAB;  Service: Cardiology;  Laterality: N/A;    LASER LITHOTRIPSY  5/30/2022    Procedure: LITHOTRIPSY, USING LASER;  Surgeon: Bonnie Knutson MD;  [facility];  Service: Urology;;    LITHOTRIPSY      MASTECTOMY Left 06/2002    left-& lymph node dissection    REPLACEMENT OF STENT Right 5/30/2022    Procedure: REPLACEMENT, STENT;  Surgeon: Bonnie Knutson MD;  [facility];  Service: Urology;  Laterality: Right;    RETROGRADE PYELOGRAPHY Right 4/28/2022    Procedure: PYELOGRAM, RETROGRADE;  Surgeon: Vik Ware MD;  [facility];  Service: Urology;  Laterality: Right;    ROBOT-ASSISTED LAPAROSCOPIC PARTIAL NEPHRECTOMY USING DA JESÚS XI Right 3/11/2021    Procedure: XI ROBOTIC NEPHRECTOMY, PARTIAL;  Surgeon: Taz Ortega MD;  [facility];  Service: Urology;  Laterality: Right;  4hr/ gen with regional  [place] confirmation:  [id number] for 11:15am case NC    URETEROSCOPIC REMOVAL OF URETERIC CALCULUS Right 5/30/2022    Procedure: REMOVAL, CALCULUS, URETER, URETEROSCOPIC;  Surgeon: Bonnie Knutson MD;  [facility];  Service: Urology;  Laterality: Right;    ureteroscopy Bilateral     6.14    URETEROSCOPY Right 5/30/2022    Procedure: URETEROSCOPY;  Surgeon: Bonnie Knutson MD;  Location: [facility] OR [address] [place] [address];  Service: Urology;  Laterality: Right;       Review of patient's allergies indicates:   Allergen Reactions    Adhesive tape-silicones     Adhesive Rash     Pt states she removed a LATEX bandaid and the skin beneath was swollen and red. No other latex causes a reaction.       No current facility-administered medications on file prior to encounter.     Current Outpatient Medications on File Prior to Encounter   Medication Sig    acetaminophen (TYLENOL) 500 MG tablet Take 2 tablets (1,000 mg total) by mouth every 8 (eight) hours as needed for Pain.    albuterol (PROVENTIL HFA) 90 mcg/actuation  inhaler Inhale 2 puffs into the lungs every 6 (six) hours as needed for Wheezing. Rescue    apixaban (ELIQUIS) 2.5 mg Tab Take 1 tablet (2.5 mg total) by mouth 2 (two) times a day.    aspirin (ECOTRIN) 81 MG EC tablet Take 81 mg by mouth once daily.    biotin 1 mg tablet Take 1,000 mcg by mouth once daily.    budesonide-glycopyr-formoterol (BREZTRI AEROSPHERE) 160-9-4.8 mcg/actuation HFAA Inhale 2 puffs into the lungs 2 (two) times daily.    busPIRone (BUSPAR) 5 MG Tab Take 5 mg by mouth as needed.    [] D5W PgBk 100 mL with cefTRIAXone 2 gram SolR 2 g Inject 2 g into the vein once daily. for 9 days    fluticasone propionate (FLONASE) 50 mcg/actuation nasal spray 1 spray by Each Nostril route daily as needed (congestion).    furosemide (LASIX) 40 MG tablet Take 1 tablet (40 mg total) by mouth once daily.    inhalation spacing device Use as directed for inhalation.    metoprolol succinate (TOPROL-XL) 100 MG 24 hr tablet Take 1 tablet (100 mg total) by mouth once daily.    vitamin D (VITAMIN D3) 1000 units Tab Take 2 tablets (2,000 Units total) by mouth once daily.     Family History       Problem Relation (Age of Onset)    Arthritis Mother, Sister    Bone cancer Mother    Breast cancer Mother, Sister    Cancer Father    Crohn's disease Daughter    Fibroids Daughter    No Known Problems Brother, Daughter          Tobacco Use    Smoking status: Former     Current packs/day: 0.00     Average packs/day: 1 pack/day for 50.0 years (50.0 ttl pk-yrs)     Types: Cigarettes     Start date:      Quit date: 2009     Years since quitting: 15.1    Smokeless tobacco: Former   Substance and Sexual Activity    Alcohol use: No    Drug use: No    Sexual activity: Not Currently     Partners: Male     Birth control/protection: Partner-Vasectomy     Review of Systems   Constitutional:  Negative for activity change, appetite change, chills and fever.   HENT:  Negative for congestion and drooling.    Eyes:  Negative for  discharge and itching.   Respiratory:  Negative for cough, chest tightness and shortness of breath.    Cardiovascular:  Negative for chest pain and palpitations.   Gastrointestinal:  Positive for abdominal pain. Negative for abdominal distention, nausea and vomiting.   Genitourinary:  Negative for dysuria and frequency.   Musculoskeletal:  Negative for arthralgias and joint swelling.   Neurological:  Negative for dizziness and light-headedness.   Psychiatric/Behavioral:  Negative for agitation and behavioral problems.      Objective:     Vital Signs (Most Recent):  Temp: 97.8 °F (36.6 °C) (07/26/24 2153)  Pulse: 96 (07/27/24 0008)  Resp: (!) 22 (07/27/24 0008)  BP: (!) 140/61 (07/27/24 0008)  SpO2: 98 % (07/26/24 2153) Vital Signs (24h Range):  Temp:  [97.8 °F (36.6 °C)-98.1 °F (36.7 °C)] 97.8 °F (36.6 °C)  Pulse:  [65-96] 96  Resp:  [18-24] 22  SpO2:  [97 %-98 %] 98 %  BP: (128-175)/(61-83) 140/61        There is no height or weight on file to calculate BMI.     Physical Exam  Constitutional:       General: She is not in acute distress.     Appearance: Normal appearance. She is not ill-appearing.   HENT:      Head: Normocephalic and atraumatic.      Right Ear: External ear normal.      Left Ear: External ear normal.      Nose: Nose normal.      Mouth/Throat:      Mouth: Mucous membranes are moist.      Pharynx: Oropharynx is clear.   Eyes:      Extraocular Movements: Extraocular movements intact.      Conjunctiva/sclera: Conjunctivae normal.   Cardiovascular:      Rate and Rhythm: Normal rate and regular rhythm.      Pulses: Normal pulses.      Heart sounds: Normal heart sounds.   Pulmonary:      Effort: Pulmonary effort is normal.      Breath sounds: Normal breath sounds.   Abdominal:      General: Abdomen is flat. There is no distension.      Palpations: Abdomen is soft.      Tenderness: There is abdominal tenderness. There is no rebound.      Comments: Lying flat and still, small movement exacerbates abdominal  pain  Soft, no rebound, voluntary guarding  Generalized TTP, most painful at RUQ and epigastrium   Musculoskeletal:         General: No swelling. Normal range of motion.      Cervical back: Normal range of motion and neck supple.   Skin:     General: Skin is warm and dry.      Capillary Refill: Capillary refill takes less than 2 seconds.   Neurological:      General: No focal deficit present.      Mental Status: She is alert and oriented to person, place, and time.   Psychiatric:         Mood and Affect: Mood normal.         Behavior: Behavior normal.                Significant Labs: All pertinent labs within the past 24 hours have been reviewed.  CBC:   Recent Labs   Lab 07/26/24 2244 07/26/24 2317   WBC  --  9.86   HGB  --  8.9*   HCT 33* 28.6*   PLT  --  263     CMP:   Recent Labs   Lab 07/26/24 2317      K 3.4*      CO2 24   *   BUN 18   CREATININE 1.2   CALCIUM 10.4   PROT 6.3   ALBUMIN 2.6*   BILITOT 1.1*   ALKPHOS 162*   *   ALT 54*   ANIONGAP 12     Cardiac Markers:   Recent Labs   Lab 07/26/24 2317   *     Lipase:   Recent Labs   Lab 07/26/24 2317   LIPASE >3000*     Troponin:   Recent Labs   Lab 07/26/24 2317   TROPONINI 0.024       Significant Imaging: I have reviewed all pertinent imaging results/findings within the past 24 hours.

## 2024-07-28 LAB
ALBUMIN SERPL BCP-MCNC: 2 G/DL (ref 3.5–5.2)
ALP SERPL-CCNC: 126 U/L (ref 55–135)
ALT SERPL W/O P-5'-P-CCNC: 56 U/L (ref 10–44)
ANION GAP SERPL CALC-SCNC: 10 MMOL/L (ref 8–16)
AST SERPL-CCNC: 81 U/L (ref 10–40)
BACTERIA UR CULT: NO GROWTH
BASOPHILS # BLD AUTO: 0.09 K/UL (ref 0–0.2)
BASOPHILS NFR BLD: 0.4 % (ref 0–1.9)
BILIRUB SERPL-MCNC: 1 MG/DL (ref 0.1–1)
BUN SERPL-MCNC: 20 MG/DL (ref 8–23)
CALCIUM SERPL-MCNC: 9.3 MG/DL (ref 8.7–10.5)
CHLORIDE SERPL-SCNC: 105 MMOL/L (ref 95–110)
CO2 SERPL-SCNC: 24 MMOL/L (ref 23–29)
CREAT SERPL-MCNC: 1.2 MG/DL (ref 0.5–1.4)
DIFFERENTIAL METHOD BLD: ABNORMAL
EOSINOPHIL # BLD AUTO: 0.2 K/UL (ref 0–0.5)
EOSINOPHIL NFR BLD: 0.9 % (ref 0–8)
ERYTHROCYTE [DISTWIDTH] IN BLOOD BY AUTOMATED COUNT: 14.7 % (ref 11.5–14.5)
EST. GFR  (NO RACE VARIABLE): 45.8 ML/MIN/1.73 M^2
GLUCOSE SERPL-MCNC: 99 MG/DL (ref 70–110)
HCT VFR BLD AUTO: 33.9 % (ref 37–48.5)
HGB BLD-MCNC: 10.7 G/DL (ref 12–16)
IMM GRANULOCYTES # BLD AUTO: 0.23 K/UL (ref 0–0.04)
IMM GRANULOCYTES NFR BLD AUTO: 1 % (ref 0–0.5)
LIPASE SERPL-CCNC: 168 U/L (ref 4–60)
LYMPHOCYTES # BLD AUTO: 1.2 K/UL (ref 1–4.8)
LYMPHOCYTES NFR BLD: 5.5 % (ref 18–48)
MAGNESIUM SERPL-MCNC: 1.6 MG/DL (ref 1.6–2.6)
MCH RBC QN AUTO: 29 PG (ref 27–31)
MCHC RBC AUTO-ENTMCNC: 31.6 G/DL (ref 32–36)
MCV RBC AUTO: 92 FL (ref 82–98)
MONOCYTES # BLD AUTO: 1.6 K/UL (ref 0.3–1)
MONOCYTES NFR BLD: 7.3 % (ref 4–15)
NEUTROPHILS # BLD AUTO: 19 K/UL (ref 1.8–7.7)
NEUTROPHILS NFR BLD: 84.9 % (ref 38–73)
NRBC BLD-RTO: 0 /100 WBC
PHOSPHATE SERPL-MCNC: 2.4 MG/DL (ref 2.7–4.5)
PLATELET # BLD AUTO: 285 K/UL (ref 150–450)
PMV BLD AUTO: 9.7 FL (ref 9.2–12.9)
POTASSIUM SERPL-SCNC: 3.8 MMOL/L (ref 3.5–5.1)
PROT SERPL-MCNC: 5.3 G/DL (ref 6–8.4)
RBC # BLD AUTO: 3.69 M/UL (ref 4–5.4)
SODIUM SERPL-SCNC: 139 MMOL/L (ref 136–145)
WBC # BLD AUTO: 22.33 K/UL (ref 3.9–12.7)

## 2024-07-28 PROCEDURE — 85025 COMPLETE CBC W/AUTO DIFF WBC: CPT

## 2024-07-28 PROCEDURE — 36415 COLL VENOUS BLD VENIPUNCTURE: CPT

## 2024-07-28 PROCEDURE — 25000003 PHARM REV CODE 250

## 2024-07-28 PROCEDURE — 63600175 PHARM REV CODE 636 W HCPCS

## 2024-07-28 PROCEDURE — 83735 ASSAY OF MAGNESIUM: CPT

## 2024-07-28 PROCEDURE — 80053 COMPREHEN METABOLIC PANEL: CPT

## 2024-07-28 PROCEDURE — 25000242 PHARM REV CODE 250 ALT 637 W/ HCPCS

## 2024-07-28 PROCEDURE — 83690 ASSAY OF LIPASE: CPT

## 2024-07-28 PROCEDURE — 84100 ASSAY OF PHOSPHORUS: CPT

## 2024-07-28 PROCEDURE — 21400001 HC TELEMETRY ROOM

## 2024-07-28 RX ADMIN — FUROSEMIDE 40 MG: 20 TABLET ORAL at 08:07

## 2024-07-28 RX ADMIN — PIPERACILLIN SODIUM AND TAZOBACTAM SODIUM 4.5 G: 4; .5 INJECTION, POWDER, FOR SOLUTION INTRAVENOUS at 08:07

## 2024-07-28 RX ADMIN — HYDROMORPHONE HYDROCHLORIDE 0.5 MG: 1 INJECTION, SOLUTION INTRAMUSCULAR; INTRAVENOUS; SUBCUTANEOUS at 02:07

## 2024-07-28 RX ADMIN — OXYCODONE HYDROCHLORIDE 5 MG: 5 TABLET ORAL at 11:07

## 2024-07-28 RX ADMIN — PIPERACILLIN SODIUM AND TAZOBACTAM SODIUM 4.5 G: 4; .5 INJECTION, POWDER, FOR SOLUTION INTRAVENOUS at 05:07

## 2024-07-28 RX ADMIN — METOPROLOL SUCCINATE 100 MG: 50 TABLET, EXTENDED RELEASE ORAL at 08:07

## 2024-07-28 RX ADMIN — PIPERACILLIN SODIUM AND TAZOBACTAM SODIUM 4.5 G: 4; .5 INJECTION, POWDER, FOR SOLUTION INTRAVENOUS at 02:07

## 2024-07-28 RX ADMIN — FLUTICASONE FUROATE AND VILANTEROL TRIFENATATE 1 PUFF: 100; 25 POWDER RESPIRATORY (INHALATION) at 08:07

## 2024-07-28 NOTE — TREATMENT PLAN
AES Treatment Plan    Misa Barajas is a 78 y.o. female admitted to hospital 7/26/2024 (Hospital Day: 3) due to Acute biliary pancreatitis.     Interval History  Patient feeling well overall. Still reports some RUQ pain and N/V but improved from admisison.    Objective  Temp:  [97.6 °F (36.4 °C)-98.2 °F (36.8 °C)] 97.6 °F (36.4 °C) (07/28 0830)  Pulse:  [72-83] 77 (07/28 1112)  BP: (131-154)/(60-73) 131/60 (07/28 0830)  Resp:  [17-18] 17 (07/28 1402)  SpO2:  [92 %-96 %] 92 % (07/28 0830)    General: Alert, Oriented x3, no distress  Abdomen: Non-distended. Normal tympany. Soft. Non-tender. No peritoneal signs.    Laboratory    Recent Labs   Lab 07/22/24  1256 07/26/24  2317 07/28/24  1009   HGB 10.2* 8.9* 10.7*         Assessment and Plan  78 y.o. with PMHx of HTN, HLD, HFpEF, COPD (on 2L NC) and paroxysmal Afib (on Eliquis, last dose 7/26) who presents with RUQ and epigastric abdominal pain radiating to the back and found to have acute pancreatitis and cholelithiasis on imaging. AES consulted for concern of retained stone. Patient denies fevers, chills, nausea, vomiting, jaundice, scleral icterus.     Labs show lipase >3000, , ALT 54, T bili 1.1, alk phos 162. CT AP and US abdomen with pancreatitis, cholelithiasis but no choledocholithiasis. No intra/extra hepatic biliary dilation or pancreatic duct dilation.    - MRCP without evidence of CBD stone  - Recommending lap asad as this is the preferred treatment  - ERCP with sphincterotomy could be offered if lap asad is not being done but this is second like treatment    Thank you for involving us in the care of Misa Barajas. Please call with any additional questions, concerns or changes in the patient's clinical status.    Timothy Valdivia MD  Gastroenterology Fellow, PGY IV

## 2024-07-28 NOTE — PROGRESS NOTES
[facility] ([facility]  General Surgery  Progress Note    Subjective:     History of Present Illness:  Ms. Barajas is a pleasant 78 yoF with complex PMH including HTN, HLD, HFpEF, COPD (on 2L NC baseline O2 sat 92%) and pAF (Eliquis, last dose 7/26) who presented to the ED with severe periumbilical abdominal pain that radiated to her back. Workup in the ED demonstrated stable vital signs on 2L NC, lab work with Lipase > 3000, , and Tbili 1.1 with mildly elevated LFTs and normal WBC. Imaging demonstrated thickening of her gallbladder wall with trace pericholecystic fluid, gallstones, and no biliary dilation. AES and General Surgery consulted.    This morning her pain his improved, but still present. She denies nausea currently, but does endorse a long history of recurrent episodes of nausea. She denies ever having pain like this before. No fevers or chills. She is on home O2, but is able to complete ADLs around the house without assistance. Her daughter lives down the street and helps out from time to time. She is unable to ambulate a city block or climb a flight of stairs. METS < 4. Abdominal surgical history significant for a laparoscopic right partial nephrectomy in 2021.     Post-Op Info:  * No surgery found *         Interval History: NAEON. HDS. On 3L NC. MRCP with cholelithiasis without cholecystitis or choledocholithiasis. She is still having diffuse abdominal pain. Denies nausea or vomiting.     Medications:  Continuous Infusions:   lactated ringers   Intravenous Continuous   Paused at 07/27/24 1200     Scheduled Meds:   fluticasone furoate-vilanteroL  1 puff Inhalation Daily    furosemide  40 mg Oral Daily    metoprolol succinate  100 mg Oral Daily    piperacillin-tazobactam (Zosyn) IV (PEDS and ADULTS) (extended infusion is not appropriate)  4.5 g Intravenous Q8H    polyethylene glycol  17 g Oral Daily     PRN Meds:  Current Facility-Administered Medications:     aluminum-magnesium  hydroxide-simethicone, 30 mL, Oral, QID PRN    dextrose 10%, 12.5 g, Intravenous, PRN    dextrose 10%, 25 g, Intravenous, PRN    glucagon (human recombinant), 1 mg, Intramuscular, PRN    glucose, 16 g, Oral, PRN    glucose, 24 g, Oral, PRN    HYDROmorphone, 0.5 mg, Intravenous, Q4H PRN    melatonin, 6 mg, Oral, Nightly PRN    naloxone, 0.02 mg, Intravenous, PRN    ondansetron, 4 mg, Intravenous, Q8H PRN    oxyCODONE, 5 mg, Oral, Q6H PRN    prochlorperazine, 5 mg, Intravenous, Q6H PRN    simethicone, 1 tablet, Oral, QID PRN    sodium chloride 0.9%, 10 mL, Intravenous, Q12H PRN     Review of patient's allergies indicates:   Allergen Reactions    Adhesive tape-silicones     Adhesive Rash     Pt states she removed a LATEX bandaid and the skin beneath was swollen and red. No other latex causes a reaction.     Objective:     Vital Signs (Most Recent):  Temp: 97.6 °F (36.4 °C) (07/28/24 0830)  Pulse: 83 (07/28/24 0843)  Resp: 18 (07/28/24 0830)  BP: 131/60 (07/28/24 0830)  SpO2: (!) 92 % (07/28/24 0830) Vital Signs (24h Range):  Temp:  [97.6 °F (36.4 °C)-98.4 °F (36.9 °C)] 97.6 °F (36.4 °C)  Pulse:  [] 83  Resp:  [17-20] 18  SpO2:  [90 %-96 %] 92 %  BP: (131-154)/(60-73) 131/60     Weight: 77.1 kg (169 lb 15.6 oz)  Body mass index is 29.18 kg/m².    Intake/Output - Last 3 Shifts         07/26 0700 07/27 0659 07/27 0700 07/28 0659 07/28 0700 07/29 0659    P.O.  0     IV Piggyback 1466.7      Total Intake(mL/kg) 1466.7 (19) 0 (0)     Urine (mL/kg/hr) 980 350 (0.2) 100 (0.5)    Emesis/NG output  0     Stool  0     Total Output 980 350 100    Net +486.7 -350 -100           Urine Occurrence  1 x 1 x    Stool Occurrence  0 x     Emesis Occurrence  0 x              Physical Exam  Vitals reviewed.   Constitutional:       Appearance: Normal appearance.   Cardiovascular:      Rate and Rhythm: Normal rate.      Pulses: Normal pulses.   Pulmonary:      Effort: No respiratory distress.   Abdominal:      General: There is no  distension.      Palpations: Abdomen is soft. There is no mass.      Tenderness: There is abdominal tenderness. There is no guarding.      Comments: Diffuse, mild tenderness, worst in the periumbilical region   Skin:     General: Skin is warm and dry.   Neurological:      General: No focal deficit present.      Mental Status: She is alert and oriented to person, place, and time.          Significant Labs:  I have reviewed all pertinent lab results within the past 24 hours.  CBC:   Recent Labs   Lab 07/26/24  2317   WBC 9.86   RBC 3.11*   HGB 8.9*   HCT 28.6*      MCV 92   MCH 28.6   MCHC 31.1*     CMP:   Recent Labs   Lab 07/26/24  2317   *   CALCIUM 10.4   ALBUMIN 2.6*   PROT 6.3      K 3.4*   CO2 24      BUN 18   CREATININE 1.2   ALKPHOS 162*   ALT 54*   *   BILITOT 1.1*       Significant Diagnostics:  I have reviewed all pertinent imaging results/findings within the past 24 hours.  Assessment/Plan:     * Acute biliary pancreatitis  78 yoF with biliary pancreatitis. AES consulted, MRCP ordered. Last dose of Eliquis was morning of 7/26. Gallbladder wall thickening likely secondary to pancreatitis and prior episodes of cholecystitis given history. We discussed the indication for same stay cholecystectomy in order to prevent future episodes of gallstone pancreatitis. We also discussed the increased risk of surgery given her co morbidities and the potential decreased risk of recurrence if ERCP with sphincterotomy were to be performed.    - Recommend HIDA scan to definitively rule out cholecystitis.   -If HIDA is negative, would recommend ERCP with sphincterotomy for definitive management and risk reduction for future gallstone pancreatitis as her surgical risk is well above average given poorly managed comorbidities with 20% risk of complication (including death) based on ACS NSQIP risk calculator which is far above average for laparoscopic cholecystectomy   - Agree with fluids  -  Recommend enteral feeding as tolerated    - Continue holding Eliquis  - Trend CBC, CMP, and lipase  - Serial abdominal exams        Jc Tompkins MD  General Surgery  [facility] ([facility])

## 2024-07-28 NOTE — ASSESSMENT & PLAN NOTE
Chronic, uncontrolled. Latest blood pressure and vitals reviewed-     Temp:  [97.6 °F (36.4 °C)-98.2 °F (36.8 °C)]   Pulse:  [72-83]   Resp:  [17-18]   BP: (131-154)/(60-73)   SpO2:  [92 %-96 %] .   Home meds for hypertension were reviewed and noted below.   Hypertension Medications               furosemide (LASIX) 40 MG tablet Take 1 tablet (40 mg total) by mouth once daily.    metoprolol succinate (TOPROL-XL) 100 MG 24 hr tablet Take 1 tablet (100 mg total) by mouth once daily.            While in the hospital, will manage blood pressure as follows; Continue home antihypertensive regimen    Will utilize p.r.n. blood pressure medication only if patient's blood pressure greater than 220/110 and she develops symptoms such as worsening chest pain or shortness of breath.

## 2024-07-28 NOTE — ASSESSMENT & PLAN NOTE
78 yoF with biliary pancreatitis. AES consulted, MRCP ordered. Last dose of Eliquis was morning of 7/26. Gallbladder wall thickening likely secondary to pancreatitis and prior episodes of cholecystitis given history. We discussed the indication for same stay cholecystectomy in order to prevent future episodes of gallstone pancreatitis. We also discussed the increased risk of surgery given her co morbidities and the potential decreased risk of recurrence if ERCP with sphincterotomy were to be performed.    - Recommend HIDA scan to definitely rule out cholecystitis.   - Agree with fluids  - Recommend enteral feeding when ready.   - Continue holding Eliquis  - Trend CBC, CMP, and lipase  - Serial abdominal exams

## 2024-07-28 NOTE — SUBJECTIVE & OBJECTIVE
Interval History: NAEON. HDS. On 3L NC. MRCP with cholelithiasis without cholecystitis or choledocholithiasis. She is still having significant epigastric/RUQ pain. Denies nausea or vomiting.     Medications:  Continuous Infusions:   lactated ringers   Intravenous Continuous   Paused at 07/27/24 1200     Scheduled Meds:   fluticasone furoate-vilanteroL  1 puff Inhalation Daily    furosemide  40 mg Oral Daily    metoprolol succinate  100 mg Oral Daily    piperacillin-tazobactam (Zosyn) IV (PEDS and ADULTS) (extended infusion is not appropriate)  4.5 g Intravenous Q8H    polyethylene glycol  17 g Oral Daily     PRN Meds:  Current Facility-Administered Medications:     aluminum-magnesium hydroxide-simethicone, 30 mL, Oral, QID PRN    dextrose 10%, 12.5 g, Intravenous, PRN    dextrose 10%, 25 g, Intravenous, PRN    glucagon (human recombinant), 1 mg, Intramuscular, PRN    glucose, 16 g, Oral, PRN    glucose, 24 g, Oral, PRN    HYDROmorphone, 0.5 mg, Intravenous, Q4H PRN    melatonin, 6 mg, Oral, Nightly PRN    naloxone, 0.02 mg, Intravenous, PRN    ondansetron, 4 mg, Intravenous, Q8H PRN    oxyCODONE, 5 mg, Oral, Q6H PRN    prochlorperazine, 5 mg, Intravenous, Q6H PRN    simethicone, 1 tablet, Oral, QID PRN    sodium chloride 0.9%, 10 mL, Intravenous, Q12H PRN     Review of patient's allergies indicates:   Allergen Reactions    Adhesive tape-silicones     Adhesive Rash     Pt states she removed a LATEX bandaid and the skin beneath was swollen and red. No other latex causes a reaction.     Objective:     Vital Signs (Most Recent):  Temp: 97.6 °F (36.4 °C) (07/28/24 0830)  Pulse: 83 (07/28/24 0843)  Resp: 18 (07/28/24 0830)  BP: 131/60 (07/28/24 0830)  SpO2: (!) 92 % (07/28/24 0830) Vital Signs (24h Range):  Temp:  [97.6 °F (36.4 °C)-98.4 °F (36.9 °C)] 97.6 °F (36.4 °C)  Pulse:  [] 83  Resp:  [17-20] 18  SpO2:  [90 %-96 %] 92 %  BP: (131-154)/(60-73) 131/60     Weight: 77.1 kg (169 lb 15.6 oz)  Body mass index is  29.18 kg/m².    Intake/Output - Last 3 Shifts         07/26 0700 07/27 0659 07/27 0700 07/28 0659 07/28 0700 07/29 0659    P.O.  0     IV Piggyback 1466.7      Total Intake(mL/kg) 1466.7 (19) 0 (0)     Urine (mL/kg/hr) 980 350 (0.2) 100 (0.5)    Emesis/NG output  0     Stool  0     Total Output 980 350 100    Net +486.7 -350 -100           Urine Occurrence  1 x 1 x    Stool Occurrence  0 x     Emesis Occurrence  0 x              Physical Exam  Vitals reviewed.   Constitutional:       Appearance: Normal appearance.   Cardiovascular:      Rate and Rhythm: Normal rate.      Pulses: Normal pulses.   Pulmonary:      Effort: No respiratory distress.   Abdominal:      General: There is no distension.      Palpations: Abdomen is soft. There is no mass.      Tenderness: There is abdominal tenderness. There is no guarding.      Comments: Diffuse, mild tenderness, worst in the periumbilical region   Skin:     General: Skin is warm and dry.   Neurological:      General: No focal deficit present.      Mental Status: She is alert and oriented to person, place, and time.          Significant Labs:  I have reviewed all pertinent lab results within the past 24 hours.  CBC:   Recent Labs   Lab 07/26/24  2317   WBC 9.86   RBC 3.11*   HGB 8.9*   HCT 28.6*      MCV 92   MCH 28.6   MCHC 31.1*     CMP:   Recent Labs   Lab 07/26/24  2317   *   CALCIUM 10.4   ALBUMIN 2.6*   PROT 6.3      K 3.4*   CO2 24      BUN 18   CREATININE 1.2   ALKPHOS 162*   ALT 54*   *   BILITOT 1.1*       Significant Diagnostics:  I have reviewed all pertinent imaging results/findings within the past 24 hours.

## 2024-07-28 NOTE — NURSING
Pt returned back from MRI, PT claims that she left her sliver cross chain at MRI. She says the staff member removed her chain, earrings and glasses and put them in a little box. I contacted [facility] ,staff says they don't recall taking off any chain but didn't removed her glasses and earrings.

## 2024-07-28 NOTE — ASSESSMENT & PLAN NOTE
Creatine stable for now. BMP reviewed- noted Estimated Creatinine Clearance: 37.6 mL/min (based on SCr of 1.2 mg/dL). according to latest data. Based on current GFR, CKD stage is stage 3 - GFR 30-59.  Monitor UOP and serial BMP and adjust therapy as needed. Renally dose meds. Avoid nephrotoxic medications and procedures.

## 2024-07-28 NOTE — PLAN OF CARE
Problem: Adult Inpatient Plan of Care  Goal: Plan of Care Review  Outcome: Progressing  Goal: Patient-Specific Goal (Individualized)  Outcome: Progressing  Goal: Absence of Hospital-Acquired Illness or Injury  Outcome: Progressing  Goal: Optimal Comfort and Wellbeing  Outcome: Progressing  Goal: Readiness for Transition of Care  Outcome: Progressing     Problem: Acute Kidney Injury/Impairment  Goal: Fluid and Electrolyte Balance  Outcome: Progressing  Goal: Improved Oral Intake  Outcome: Progressing  Goal: Effective Renal Function  Outcome: Progressing     Problem: Wound  Goal: Optimal Coping  Outcome: Progressing  Goal: Optimal Functional Ability  Outcome: Progressing  Goal: Absence of Infection Signs and Symptoms  Outcome: Progressing  Goal: Improved Oral Intake  Outcome: Progressing  Goal: Optimal Pain Control and Function  Outcome: Progressing  Goal: Skin Health and Integrity  Outcome: Progressing  Goal: Optimal Wound Healing  Outcome: Progressing     Problem: Skin Injury Risk Increased  Goal: Skin Health and Integrity  Outcome: Progressing     Problem: Pain Acute  Goal: Optimal Pain Control and Function  Outcome: Progressing     Problem: Infection  Goal: Absence of Infection Signs and Symptoms  Outcome: Progressing

## 2024-07-28 NOTE — PLAN OF CARE
Problem: Adult Inpatient Plan of Care  Goal: Plan of Care Review  Outcome: Not Progressing  Goal: Optimal Comfort and Wellbeing  Outcome: Not Progressing     Problem: Pain Acute  Goal: Optimal Pain Control and Function  Outcome: Progressing

## 2024-07-28 NOTE — SUBJECTIVE & OBJECTIVE
Interval History: Patient went to MRCP which shows pancreatitis and leans away from Cholecystitis; no evidence of choledocholithiasis seen. HIDA scan scheduled to definitively r/o cholecystitis. General Surgery saw patient and recommends an ERCP with sphincterotomy vs a cholecystectomy due to co morbidities increasing surgical risk. Gastroenterology recommends the cholecystectomy as a better first line treatment, but that  ERCP with sphincterectomy is a good second line option if cholecystectomy cannot be performed.     Review of Systems   Constitutional:  Negative for chills, diaphoresis and fever.   Respiratory:  Positive for shortness of breath. Negative for cough.         Patient requires 2L oxygen at home   Cardiovascular:  Positive for leg swelling. Negative for chest pain and palpitations.   Gastrointestinal:  Positive for abdominal pain. Negative for constipation, diarrhea, nausea and vomiting.        Pain in upper quadrants, radiates  to back   Genitourinary:  Negative for difficulty urinating and dysuria.   Musculoskeletal:  Negative for arthralgias and myalgias.   Skin:  Negative for color change.   Neurological:  Negative for dizziness and headaches.     Objective:     Vital Signs (Most Recent):  Temp: 97.6 °F (36.4 °C) (07/28/24 0830)  Pulse: 77 (07/28/24 1112)  Resp: 17 (07/28/24 1402)  BP: 131/60 (07/28/24 0830)  SpO2: (!) 92 % (07/28/24 0830) Vital Signs (24h Range):  Temp:  [97.6 °F (36.4 °C)-98.2 °F (36.8 °C)] 97.6 °F (36.4 °C)  Pulse:  [72-83] 77  Resp:  [17-18] 17  SpO2:  [92 %-96 %] 92 %  BP: (131-154)/(60-73) 131/60     Weight: 77.1 kg (169 lb 15.6 oz)  Body mass index is 29.18 kg/m².    Intake/Output Summary (Last 24 hours) at 7/28/2024 1752  Last data filed at 7/28/2024 1705  Gross per 24 hour   Intake 118 ml   Output 450 ml   Net -332 ml         Physical Exam  Constitutional:       General: She is not in acute distress.     Appearance: Normal appearance. She is not ill-appearing.   HENT:       Head: Normocephalic and atraumatic.   Eyes:      General: No scleral icterus.     Conjunctiva/sclera: Conjunctivae normal.   Cardiovascular:      Rate and Rhythm: Normal rate and regular rhythm.      Pulses: Normal pulses.      Heart sounds: Normal heart sounds.   Pulmonary:      Effort: Pulmonary effort is normal.      Breath sounds: Normal breath sounds.   Abdominal:      General: Abdomen is flat. There is no distension.      Palpations: Abdomen is soft.      Tenderness: There is abdominal tenderness. There is no rebound.      Comments: Most tender in epigastric and RUQ upon palpation   Musculoskeletal:         General: No swelling. Normal range of motion.      Cervical back: Normal range of motion and neck supple.   Skin:     General: Skin is warm and dry.      Capillary Refill: Capillary refill takes less than 2 seconds.      Coloration: Skin is not jaundiced.   Neurological:      Mental Status: She is alert and oriented to person, place, and time.   Psychiatric:         Mood and Affect: Mood normal.         Behavior: Behavior normal.             Significant Labs: All pertinent labs within the past 24 hours have been reviewed.    Significant Imaging: I have reviewed all pertinent imaging results/findings within the past 24 hours.

## 2024-07-28 NOTE — ASSESSMENT & PLAN NOTE
Sudden onset abdominal pain not a/w eating c/f gallstone pancreatitis (given cholelithiasis seen on CTA and US). No fever, chills, or other s/s infection to suggest cholangitis. Does have elevated T bili and AST/ALT. Severe abdominal pain. No nausea or vomiting. No acute alcohol intake. Slight hypercalcemia of unknown etiology, however not profound enough to explain acute pancreatitis. Lipase >3000. CT AP with peripancreatic stranding and edema, greatest about the head and neck, favored to be related to pancreatitis. Location of pain plus CT findings and lab findings do not suggest nephrolithiasis as etiology.     - AES consulted given LFT elevation, appreciate recommendations  - MRCP to better delineate biliary obstruction  - No antibiotics as low concern for cholangitis, would start Zosyn if febrile or becoming hypotensive  - NPO  - cautious with fluids given CHF (no signs of decompensation, got 1L in ED)  - analgesic therapy  -General surgery recommends HIDA to definitively r/o cholecystitis  -If HIDA negative, General Surgery recommends ERCP w/ sphincterotomy due to co-morbidities increasing surgical risk   -Gastroenterology states cholecystectomy is best first line treatment, but an ERCP with sphincterectomy is a good second line treatment if cholecystectomy cannot be done

## 2024-07-28 NOTE — ASSESSMENT & PLAN NOTE
Patient with Paroxysmal (<7 days) atrial fibrillation which is controlled currently with Beta Blocker. Patient is currently in sinus rhythm.[id number] Score: 3. Anticoagulation not indicated due to possible procedure .

## 2024-07-28 NOTE — HOSPITAL COURSE
Patient admitted for pancreatitis 2/2 likely blocked duct. CTA Abdomen/pelvis (7/26/24) revealed non-obstruction renal calculi, cholelithiasis, possible early cholecystitis, no signs of choledocholithiasis, and stranding in the pancreas suggestive of pancreatits. MRCP on 7/28/24 showed cholelithiasis and pancreatitis without choledocolithiasis or cholecystitis. General surgery consulted and recommended HIDA to definitively rule out Cholecystitis. If HIDA is negative, general surgery recommends an ERCP with sphincterotomy instead of a cholecystectomy due to co-morbidities increasing surgical risk. HIDA scan was negative for cholecystitis. Patient underwent an ERCP with sphincterotomy 7/30/24. Pain controlled, PT/OT with recommendations for moderate intensity therapy. SW aware. Pain has greatly improved and there there is no longer reported tenderness. Patient with persistent leukocytosis, given patient AF, physical exam unrevealing for source of infection, likely stress induced but will consider referral to hematology outpatient to make sure no other underlying issues are present. Leukocytes returned to within normal limits on 8//9/24. Patient developed and is being treated for a yeast infection.

## 2024-07-28 NOTE — PROGRESS NOTES
[facility] ([address] Medicine  Progress Note    Patient Name: Misa Barajas  MRN: [id number]  Patient Class: IP- Inpatient   Admission Date: 7/26/2024  Length of Stay: 1 days  Attending Physician: Brea Casanova DO  Primary Care Provider: Celia Carlisle MD        Subjective:     Principal Problem:Acute biliary pancreatitis        HPI:  Misa Barajas is a 78F with a PMHx of HTN, HLD, HFpEF, COPD (on 2L NC baseline O2 sat 92%) and pAF who presented to [facility] 7/26 for acute onset abdominal pain that radiated to her back. States she has RUQ and epigastric pain that started acutely this afternoon. She rates her pain as 10/10 intensity, sharp and non-pleuritic in nature. She reports a past history of nephrolithiasis but states that this pain is different. Pain is not post-prandial in nature and explains that this is differen than her past episodes of nephrolithiasis. SOB started at around thesame time and she normally is on 2L/min of at home oxygen due to her COPD. She states that she was watching TV when the pain suddenly started. She denies nausea, vomiting, fever, chills, or LUTS including hamturia or dysuria. Recent admission 7/12-7/18 for pasturella bactermia d/t cat bite, completed 2wk CTX (EOT 7/26, midline removed 7/26). No recent changes in appetite reported. She also denies diarrhea, constipation.     In ED, Slightly hypertensive 172/83, no tachycardia, on 2L NC saturating 98%, afebrile. CBC with slightly worsened anemia (Hb 8.9, baseline seems approx 10), no leukocytosis. CMP with hypokalemia K 3.4, hypercalcemia (corrected 11.5), Alb 2.6, LFT elevation including T. Bili 1.1, AST//54, . Lipase >3000. , troponin normal. CTA (obtained for aortic dissection r/o) showed cholelithiasis Distended BG with pericholecystic fluid and cholelithiasis. No intrahepatic or extrahepatic biliary ductal dilatation. CTA showed cholelithiasis so we obtained a right upper  quadrant ultrasound as well that confirmed the cholelithiasis and could not necessarily rule out cholecystitis. Gave CTX and 1L IVF.     Overview/Hospital Course:  CTA Abdomen/pelvis (7/26/24) revealed non-obstruction renal calculi, cholelithiasis, possible early cholecystitis, no signs of choledocholithiasis, and stranding in the pancreas suggestive of pancreatits. MRCP on 7/28/24 showed cholelithiasis and pancreatitis without choledocolithiasis or cholecystitis. General surgery consulted and recommends a HIDA to definitively rule out Cholecystitis. If HIDA is negative, general surgery recommends an ERCP with sphincterotomy instead of a cholecystectomy due to co-morbidities increasing surgical risk. Gastroenterology recommends the cholecystectomy but will do a sphincterotomy if this cannot be done.     Interval History: Patient went to MRCP which shows pancreatitis and leans away from Cholecystitis; no evidence of choledocholithiasis seen. HIDA scan scheduled to definitively r/o cholecystitis. General Surgery saw patient and recommends an ERCP with sphincterotomy vs a cholecystectomy due to co morbidities increasing surgical risk. Gastroenterology recommends the cholecystectomy as a better first line treatment, but that  ERCP with sphincterectomy is a good second line option if cholecystectomy cannot be performed.     Review of Systems   Constitutional:  Negative for chills, diaphoresis and fever.   Respiratory:  Positive for shortness of breath. Negative for cough.         Patient requires 2L oxygen at home   Cardiovascular:  Positive for leg swelling. Negative for chest pain and palpitations.   Gastrointestinal:  Positive for abdominal pain. Negative for constipation, diarrhea, nausea and vomiting.        Pain in upper quadrants, radiates  to back   Genitourinary:  Negative for difficulty urinating and dysuria.   Musculoskeletal:  Negative for arthralgias and myalgias.   Skin:  Negative for color change.    Neurological:  Negative for dizziness and headaches.     Objective:     Vital Signs (Most Recent):  Temp: 97.6 °F (36.4 °C) (07/28/24 0830)  Pulse: 77 (07/28/24 1112)  Resp: 17 (07/28/24 1402)  BP: 131/60 (07/28/24 0830)  SpO2: (!) 92 % (07/28/24 0830) Vital Signs (24h Range):  Temp:  [97.6 °F (36.4 °C)-98.2 °F (36.8 °C)] 97.6 °F (36.4 °C)  Pulse:  [72-83] 77  Resp:  [17-18] 17  SpO2:  [92 %-96 %] 92 %  BP: (131-154)/(60-73) 131/60     Weight: 77.1 kg (169 lb 15.6 oz)  Body mass index is 29.18 kg/m².    Intake/Output Summary (Last 24 hours) at 7/28/2024 1752  Last data filed at 7/28/2024 1705  Gross per 24 hour   Intake 118 ml   Output 450 ml   Net -332 ml         Physical Exam  Constitutional:       General: She is not in acute distress.     Appearance: Normal appearance. She is not ill-appearing.   HENT:      Head: Normocephalic and atraumatic.   Eyes:      General: No scleral icterus.     Conjunctiva/sclera: Conjunctivae normal.   Cardiovascular:      Rate and Rhythm: Normal rate and regular rhythm.      Pulses: Normal pulses.      Heart sounds: Normal heart sounds.   Pulmonary:      Effort: Pulmonary effort is normal.      Breath sounds: Normal breath sounds.   Abdominal:      General: Abdomen is flat. There is no distension.      Palpations: Abdomen is soft.      Tenderness: There is abdominal tenderness. There is no rebound.      Comments: Most tender in epigastric and RUQ upon palpation   Musculoskeletal:         General: No swelling. Normal range of motion.      Cervical back: Normal range of motion and neck supple.   Skin:     General: Skin is warm and dry.      Capillary Refill: Capillary refill takes less than 2 seconds.      Coloration: Skin is not jaundiced.   Neurological:      Mental Status: She is alert and oriented to person, place, and time.   Psychiatric:         Mood and Affect: Mood normal.         Behavior: Behavior normal.             Significant Labs: All pertinent labs within the past 24  hours have been reviewed.    Significant Imaging: I have reviewed all pertinent imaging results/findings within the past 24 hours.    Assessment/Plan:      * Acute biliary pancreatitis  Sudden onset abdominal pain not a/w eating c/f gallstone pancreatitis (given cholelithiasis seen on CTA and US). No fever, chills, or other s/s infection to suggest cholangitis. Does have elevated T bili and AST/ALT. Severe abdominal pain. No nausea or vomiting. No acute alcohol intake. Slight hypercalcemia of unknown etiology, however not profound enough to explain acute pancreatitis. Lipase >3000. CT AP with peripancreatic stranding and edema, greatest about the head and neck, favored to be related to pancreatitis. Location of pain plus CT findings and lab findings do not suggest nephrolithiasis as etiology.     - AES consulted given LFT elevation, appreciate recommendations  - MRCP to better delineate biliary obstruction  - No antibiotics as low concern for cholangitis, would start Zosyn if febrile or becoming hypotensive  - NPO  - cautious with fluids given CHF (no signs of decompensation, got 1L in ED)  - analgesic therapy  -General surgery recommends HIDA to definitively r/o cholecystitis  -If HIDA negative, General Surgery recommends ERCP w/ sphincterotomy due to co-morbidities increasing surgical risk   -Gastroenterology states cholecystectomy is best first line treatment, but an ERCP with sphincterectomy is a good second line treatment if cholecystectomy cannot be done      COPD (chronic obstructive pulmonary disease)  Patient's COPD is controlled currently.  Patient is currently off COPD Pathway. Continue scheduled inhalers Supplemental oxygen and monitor respiratory status closely.     No wheezing, no s/s respiratory illness. On home 2L NC.     - No Brezztri on formulary, started on breo    Chronic diastolic heart failure  No signs of decompensation. Stable RLE edema. No orthopnea, no rales.    - Continue home lasix and  metoprolol      Paroxysmal atrial fibrillation  Patient with Paroxysmal (<7 days) atrial fibrillation which is controlled currently with Beta Blocker. Patient is currently in sinus rhythm.[id number] Score: 3. Anticoagulation not indicated due to possible procedure .    CKD (chronic kidney disease) stage 3, GFR 30-59 ml/min  Creatine stable for now. BMP reviewed- noted Estimated Creatinine Clearance: 37.6 mL/min (based on SCr of 1.2 mg/dL). according to latest data. Based on current GFR, CKD stage is stage 3 - GFR 30-59.  Monitor UOP and serial BMP and adjust therapy as needed. Renally dose meds. Avoid nephrotoxic medications and procedures.    Secondary hyperparathyroidism of renal origin  Causing slight hypercalcemia.     - Check calcitriol   - CTM CMP      Essential hypertension  Chronic, uncontrolled. Latest blood pressure and vitals reviewed-     Temp:  [97.6 °F (36.4 °C)-98.2 °F (36.8 °C)]   Pulse:  [72-83]   Resp:  [17-18]   BP: (131-154)/(60-73)   SpO2:  [92 %-96 %] .   Home meds for hypertension were reviewed and noted below.   Hypertension Medications               furosemide (LASIX) 40 MG tablet Take 1 tablet (40 mg total) by mouth once daily.    metoprolol succinate (TOPROL-XL) 100 MG 24 hr tablet Take 1 tablet (100 mg total) by mouth once daily.            While in the hospital, will manage blood pressure as follows; Continue home antihypertensive regimen    Will utilize p.r.n. blood pressure medication only if patient's blood pressure greater than 220/110 and she develops symptoms such as worsening chest pain or shortness of breath.      VTE Risk Mitigation (From admission, onward)           Ordered     Reason for No Pharmacological VTE Prophylaxis  Once        Comments: Anticipate possible procerdure   Question:  Reasons:  Answer:  Physician Provided (leave comment)    07/27/24 0430     IP VTE HIGH RISK PATIENT  Once         07/27/24 0430     Place sequential compression device  Until discontinued          07/27/24 0430                    Discharge Planning   NOHEMI: 7/31/2024     Code Status: Full Code   Is the patient medically ready for discharge?:     Reason for patient still in hospital (select all that apply): Patient trending condition, Laboratory test, Treatment, and Imaging  Discharge Plan A: Home with family, Home Health                  Karey Liang MD  Department of Hospital Medicine   [facility] ([place])

## 2024-07-29 ENCOUNTER — ANESTHESIA EVENT (OUTPATIENT)
Dept: ENDOSCOPY | Facility: HOSPITAL | Age: 79
End: 2024-07-29
Payer: MEDICARE

## 2024-07-29 LAB
BASOPHILS # BLD AUTO: 0.08 K/UL (ref 0–0.2)
BASOPHILS NFR BLD: 0.4 % (ref 0–1.9)
DIFFERENTIAL METHOD BLD: ABNORMAL
EOSINOPHIL # BLD AUTO: 0.2 K/UL (ref 0–0.5)
EOSINOPHIL NFR BLD: 0.9 % (ref 0–8)
ERYTHROCYTE [DISTWIDTH] IN BLOOD BY AUTOMATED COUNT: 14.9 % (ref 11.5–14.5)
HCT VFR BLD AUTO: 35.4 % (ref 37–48.5)
HGB BLD-MCNC: 11.2 G/DL (ref 12–16)
IMM GRANULOCYTES # BLD AUTO: 0.12 K/UL (ref 0–0.04)
IMM GRANULOCYTES NFR BLD AUTO: 0.6 % (ref 0–0.5)
LIPASE SERPL-CCNC: 40 U/L (ref 4–60)
LYMPHOCYTES # BLD AUTO: 1.5 K/UL (ref 1–4.8)
LYMPHOCYTES NFR BLD: 7.6 % (ref 18–48)
MCH RBC QN AUTO: 29.8 PG (ref 27–31)
MCHC RBC AUTO-ENTMCNC: 31.6 G/DL (ref 32–36)
MCV RBC AUTO: 94 FL (ref 82–98)
MONOCYTES # BLD AUTO: 1.7 K/UL (ref 0.3–1)
MONOCYTES NFR BLD: 8.6 % (ref 4–15)
NEUTROPHILS # BLD AUTO: 15.9 K/UL (ref 1.8–7.7)
NEUTROPHILS NFR BLD: 81.9 % (ref 38–73)
NRBC BLD-RTO: 0 /100 WBC
PLATELET # BLD AUTO: 262 K/UL (ref 150–450)
PMV BLD AUTO: 9.9 FL (ref 9.2–12.9)
RBC # BLD AUTO: 3.76 M/UL (ref 4–5.4)
WBC # BLD AUTO: 19.41 K/UL (ref 3.9–12.7)

## 2024-07-29 PROCEDURE — 85025 COMPLETE CBC W/AUTO DIFF WBC: CPT

## 2024-07-29 PROCEDURE — 21400001 HC TELEMETRY ROOM

## 2024-07-29 PROCEDURE — 83690 ASSAY OF LIPASE: CPT

## 2024-07-29 PROCEDURE — 25000003 PHARM REV CODE 250

## 2024-07-29 PROCEDURE — 99232 SBSQ HOSP IP/OBS MODERATE 35: CPT | Mod: ,,, | Performed by: STUDENT IN AN ORGANIZED HEALTH CARE EDUCATION/TRAINING PROGRAM

## 2024-07-29 PROCEDURE — 63600175 PHARM REV CODE 636 W HCPCS

## 2024-07-29 PROCEDURE — 36415 COLL VENOUS BLD VENIPUNCTURE: CPT

## 2024-07-29 PROCEDURE — A9537 TC99M MEBROFENIN: HCPCS | Performed by: STUDENT IN AN ORGANIZED HEALTH CARE EDUCATION/TRAINING PROGRAM

## 2024-07-29 RX ORDER — KIT FOR THE PREPARATION OF TECHNETIUM TC 99M MEBROFENIN 45 MG/10ML
5.5 INJECTION, POWDER, LYOPHILIZED, FOR SOLUTION INTRAVENOUS
Status: COMPLETED | OUTPATIENT
Start: 2024-07-29 | End: 2024-07-29

## 2024-07-29 RX ORDER — SENNOSIDES 8.6 MG/1
8.6 TABLET ORAL DAILY
Status: DISCONTINUED | OUTPATIENT
Start: 2024-07-30 | End: 2024-08-12 | Stop reason: HOSPADM

## 2024-07-29 RX ADMIN — HYDROMORPHONE HYDROCHLORIDE 0.5 MG: 1 INJECTION, SOLUTION INTRAMUSCULAR; INTRAVENOUS; SUBCUTANEOUS at 10:07

## 2024-07-29 RX ADMIN — PIPERACILLIN SODIUM AND TAZOBACTAM SODIUM 4.5 G: 4; .5 INJECTION, POWDER, FOR SOLUTION INTRAVENOUS at 12:07

## 2024-07-29 RX ADMIN — OXYCODONE HYDROCHLORIDE 5 MG: 5 TABLET ORAL at 05:07

## 2024-07-29 RX ADMIN — HYDROMORPHONE HYDROCHLORIDE 0.5 MG: 1 INJECTION, SOLUTION INTRAMUSCULAR; INTRAVENOUS; SUBCUTANEOUS at 12:07

## 2024-07-29 RX ADMIN — FLUTICASONE FUROATE AND VILANTEROL TRIFENATATE 1 PUFF: 100; 25 POWDER RESPIRATORY (INHALATION) at 09:07

## 2024-07-29 RX ADMIN — METOPROLOL SUCCINATE 100 MG: 50 TABLET, EXTENDED RELEASE ORAL at 08:07

## 2024-07-29 RX ADMIN — FUROSEMIDE 40 MG: 20 TABLET ORAL at 08:07

## 2024-07-29 RX ADMIN — PIPERACILLIN SODIUM AND TAZOBACTAM SODIUM 4.5 G: 4; .5 INJECTION, POWDER, FOR SOLUTION INTRAVENOUS at 03:07

## 2024-07-29 RX ADMIN — MEBROFENIN 5.5 MILLICURIE: 45 INJECTION, POWDER, LYOPHILIZED, FOR SOLUTION INTRAVENOUS at 10:07

## 2024-07-29 RX ADMIN — HYDROMORPHONE HYDROCHLORIDE 0.5 MG: 1 INJECTION, SOLUTION INTRAMUSCULAR; INTRAVENOUS; SUBCUTANEOUS at 03:07

## 2024-07-29 NOTE — ASSESSMENT & PLAN NOTE
Patient is an 78 year old female with biliary pancreatitis. AES consulted, MRCP ordered. Last dose of Eliquis was morning of 7/26. Gallbladder wall thickening likely secondary to pancreatitis and prior episodes of cholecystitis given history. We discussed the indication for same stay cholecystectomy in order to prevent future episodes of gallstone pancreatitis. We also discussed the increased risk of surgery given her co morbidities and the potential decreased risk of recurrence if ERCP with sphincterotomy were to be performed.    - Recommend HIDA scan to definitely rule out cholecystitis.   - If HIDA is negative, would recommend ERCP with sphincterotomy for definitive management and risk reduction for future gallstone pancreatitis as her surgical risk is well above average given poorly managed comorbidities with 20% risk of complication (including death) based on ACS NSQIP risk calculator which is far above average for laparoscopic cholecystectomy   - Agree with fluids  - Recommend enteral feeding when ready.   - Continue holding Eliquis  - Trend CBC, CMP, and lipase  - Serial abdominal exams  - Remainder of care per primary team  - Please contact general surgery with any questions, concerns, or clinical status changes

## 2024-07-29 NOTE — SUBJECTIVE & OBJECTIVE
Interval History: NAEON. Afebrile. HDS. On 3L O2 via NC. Pain is controlled. Denies n/v.   WBC 22 > 19  CMP pending   Lipase wnl     Medications:  Continuous Infusions:      Scheduled Meds:   fluticasone furoate-vilanteroL  1 puff Inhalation Daily    furosemide  40 mg Oral Daily    metoprolol succinate  100 mg Oral Daily    piperacillin-tazobactam (Zosyn) IV (PEDS and ADULTS) (extended infusion is not appropriate)  4.5 g Intravenous Q8H    polyethylene glycol  17 g Oral Daily     PRN Meds:  Current Facility-Administered Medications:     aluminum-magnesium hydroxide-simethicone, 30 mL, Oral, QID PRN    dextrose 10%, 12.5 g, Intravenous, PRN    dextrose 10%, 25 g, Intravenous, PRN    glucagon (human recombinant), 1 mg, Intramuscular, PRN    glucose, 16 g, Oral, PRN    glucose, 24 g, Oral, PRN    HYDROmorphone, 0.5 mg, Intravenous, Q4H PRN    melatonin, 6 mg, Oral, Nightly PRN    naloxone, 0.02 mg, Intravenous, PRN    ondansetron, 4 mg, Intravenous, Q8H PRN    oxyCODONE, 5 mg, Oral, Q6H PRN    prochlorperazine, 5 mg, Intravenous, Q6H PRN    simethicone, 1 tablet, Oral, QID PRN    sodium chloride 0.9%, 10 mL, Intravenous, Q12H PRN     Review of patient's allergies indicates:   Allergen Reactions    Adhesive tape-silicones     Adhesive Rash     Pt states she removed a LATEX bandaid and the skin beneath was swollen and red. No other latex causes a reaction.     Objective:     Vital Signs (Most Recent):  Temp: 98.9 °F (37.2 °C) (07/29/24 0830)  Pulse: 76 (07/29/24 0830)  Resp: 17 (07/29/24 0830)  BP: (!) 157/74 (07/29/24 0830)  SpO2: (!) 92 % (07/29/24 0830) Vital Signs (24h Range):  Temp:  [97 °F (36.1 °C)-98.9 °F (37.2 °C)] 98.9 °F (37.2 °C)  Pulse:  [73-83] 76  Resp:  [16-19] 17  SpO2:  [92 %-97 %] 92 %  BP: (128-157)/(59-94) 157/74     Weight: 77.1 kg (169 lb 15.6 oz)  Body mass index is 29.18 kg/m².    Intake/Output - Last 3 Shifts         07/27 0700 07/28 0659 07/28 0700 07/29 0659 07/29 0700 07/30 0659    P.O. 0  118     IV Piggyback       Total Intake(mL/kg) 0 (0) 118 (1.5)     Urine (mL/kg/hr) 350 (0.2) 650 (0.4)     Emesis/NG output 0      Stool 0      Total Output 350 650     Net -350 -532            Urine Occurrence 1 x 3 x     Stool Occurrence 0 x      Emesis Occurrence 0 x               Physical Exam  Vitals and nursing note reviewed.   Constitutional:       General: She is not in acute distress.     Comments: O2 via NC   HENT:      Head: Normocephalic and atraumatic.      Nose: Nose normal.   Eyes:      General: No scleral icterus.     Extraocular Movements: Extraocular movements intact.   Cardiovascular:      Rate and Rhythm: Normal rate.   Pulmonary:      Effort: Pulmonary effort is normal. No respiratory distress.   Abdominal:      General: There is no distension.      Palpations: Abdomen is soft. There is no mass.      Tenderness: There is no guarding.      Comments: Diffuse, mild tenderness, worst in the periumbilical region. No peritonitic signs    Skin:     General: Skin is warm and dry.   Neurological:      General: No focal deficit present.      Mental Status: She is alert and oriented to person, place, and time.          Significant Labs:  I have reviewed all pertinent lab results within the past 24 hours.  CBC:   Recent Labs   Lab 07/29/24  0612   WBC 19.41*   RBC 3.76*   HGB 11.2*   HCT 35.4*      MCV 94   MCH 29.8   MCHC 31.6*     CMP:   Recent Labs   Lab 07/28/24  1009   GLU 99   CALCIUM 9.3   ALBUMIN 2.0*   PROT 5.3*      K 3.8   CO2 24      BUN 20   CREATININE 1.2   ALKPHOS 126   ALT 56*   AST 81*   BILITOT 1.0       Significant Diagnostics:  I have reviewed all pertinent imaging results/findings within the past 24 hours.

## 2024-07-29 NOTE — PROGRESS NOTES
[facility] - Med Surg ([facility]  General Surgery  Progress Note    Subjective:     History of Present Illness:  Ms. Barajas is a pleasant 78 yoF with complex PMH including HTN, HLD, HFpEF, COPD (on 2L NC baseline O2 sat 92%) and pAF (Eliquis, last dose 7/26) who presented to the ED with severe periumbilical abdominal pain that radiated to her back. Workup in the ED demonstrated stable vital signs on 2L NC, lab work with Lipase > 3000, , and Tbili 1.1 with mildly elevated LFTs and normal WBC. Imaging demonstrated thickening of her gallbladder wall with trace pericholecystic fluid, gallstones, and no biliary dilation. AES and General Surgery consulted.    This morning her pain his improved, but still present. She denies nausea currently, but does endorse a long history of recurrent episodes of nausea. She denies ever having pain like this before. No fevers or chills. She is on home O2, but is able to complete ADLs around the house without assistance. Her daughter lives down the street and helps out from time to time. She is unable to ambulate a city block or climb a flight of stairs. METS < 4. Abdominal surgical history significant for a laparoscopic right partial nephrectomy in 2021.     Post-Op Info:  * No surgery found *         Interval History: NAEON. Afebrile. HDS. On 3L O2 via NC. Pain is controlled. Denies n/v.   WBC 22 > 19  CMP pending   Lipase wnl     Medications:  Continuous Infusions:      Scheduled Meds:   fluticasone furoate-vilanteroL  1 puff Inhalation Daily    furosemide  40 mg Oral Daily    metoprolol succinate  100 mg Oral Daily    piperacillin-tazobactam (Zosyn) IV (PEDS and ADULTS) (extended infusion is not appropriate)  4.5 g Intravenous Q8H    polyethylene glycol  17 g Oral Daily     PRN Meds:  Current Facility-Administered Medications:     aluminum-magnesium hydroxide-simethicone, 30 mL, Oral, QID PRN    dextrose 10%, 12.5 g, Intravenous, PRN    dextrose 10%, 25 g, Intravenous,  PRN    glucagon (human recombinant), 1 mg, Intramuscular, PRN    glucose, 16 g, Oral, PRN    glucose, 24 g, Oral, PRN    HYDROmorphone, 0.5 mg, Intravenous, Q4H PRN    melatonin, 6 mg, Oral, Nightly PRN    naloxone, 0.02 mg, Intravenous, PRN    ondansetron, 4 mg, Intravenous, Q8H PRN    oxyCODONE, 5 mg, Oral, Q6H PRN    prochlorperazine, 5 mg, Intravenous, Q6H PRN    simethicone, 1 tablet, Oral, QID PRN    sodium chloride 0.9%, 10 mL, Intravenous, Q12H PRN     Review of patient's allergies indicates:   Allergen Reactions    Adhesive tape-silicones     Adhesive Rash     Pt states she removed a LATEX bandaid and the skin beneath was swollen and red. No other latex causes a reaction.     Objective:     Vital Signs (Most Recent):  Temp: 98.9 °F (37.2 °C) (07/29/24 0830)  Pulse: 76 (07/29/24 0830)  Resp: 17 (07/29/24 0830)  BP: (!) 157/74 (07/29/24 0830)  SpO2: (!) 92 % (07/29/24 0830) Vital Signs (24h Range):  Temp:  [97 °F (36.1 °C)-98.9 °F (37.2 °C)] 98.9 °F (37.2 °C)  Pulse:  [73-83] 76  Resp:  [16-19] 17  SpO2:  [92 %-97 %] 92 %  BP: (128-157)/(59-94) 157/74     Weight: 77.1 kg (169 lb 15.6 oz)  Body mass index is 29.18 kg/m².    Intake/Output - Last 3 Shifts         07/27 0700 07/28 0659 07/28 0700 07/29 0659 07/29 0700 07/30 0659    P.O. 0 118     IV Piggyback       Total Intake(mL/kg) 0 (0) 118 (1.5)     Urine (mL/kg/hr) 350 (0.2) 650 (0.4)     Emesis/NG output 0      Stool 0      Total Output 350 650     Net -350 -532            Urine Occurrence 1 x 3 x     Stool Occurrence 0 x      Emesis Occurrence 0 x               Physical Exam  Vitals and nursing note reviewed.   Constitutional:       General: She is not in acute distress.     Comments: O2 via NC   HENT:      Head: Normocephalic and atraumatic.      Nose: Nose normal.   Eyes:      General: No scleral icterus.     Extraocular Movements: Extraocular movements intact.   Cardiovascular:      Rate and Rhythm: Normal rate.   Pulmonary:      Effort: Pulmonary  effort is normal. No respiratory distress.   Abdominal:      General: There is no distension.      Palpations: Abdomen is soft. There is no mass.      Tenderness: There is no guarding.      Comments: Diffuse, mild tenderness, worst in the periumbilical region. No peritonitic signs    Skin:     General: Skin is warm and dry.   Neurological:      General: No focal deficit present.      Mental Status: She is alert and oriented to person, place, and time.          Significant Labs:  I have reviewed all pertinent lab results within the past 24 hours.  CBC:   Recent Labs   Lab 07/29/24  0612   WBC 19.41*   RBC 3.76*   HGB 11.2*   HCT 35.4*      MCV 94   MCH 29.8   MCHC 31.6*     CMP:   Recent Labs   Lab 07/28/24  1009   GLU 99   CALCIUM 9.3   ALBUMIN 2.0*   PROT 5.3*      K 3.8   CO2 24      BUN 20   CREATININE 1.2   ALKPHOS 126   ALT 56*   AST 81*   BILITOT 1.0       Significant Diagnostics:  I have reviewed all pertinent imaging results/findings within the past 24 hours.  Assessment/Plan:     * Acute biliary pancreatitis  Patient is an 78 year old female with biliary pancreatitis. AES consulted, MRCP ordered. Last dose of Eliquis was morning of 7/26. Gallbladder wall thickening likely secondary to pancreatitis and prior episodes of cholecystitis given history. We discussed the indication for same stay cholecystectomy in order to prevent future episodes of gallstone pancreatitis. We also discussed the increased risk of surgery given her co morbidities and the potential decreased risk of recurrence if ERCP with sphincterotomy were to be performed.    - Recommend HIDA scan to definitely rule out cholecystitis.   - If HIDA is negative, would recommend ERCP with sphincterotomy for definitive management and risk reduction for future gallstone pancreatitis as her surgical risk is well above average given poorly managed comorbidities with 20% risk of complication (including death) based on ACS NSQIP risk  calculator which is far above average for laparoscopic cholecystectomy   - Agree with fluids  - Recommend enteral feeding when ready.   - Continue holding Eliquis  - Trend CBC, CMP, and lipase  - Serial abdominal exams  - Remainder of care per primary team  - Please contact general surgery with any questions, concerns, or clinical status changes      Case discussed with Dr. Rivera.      CARSON PatelC  General Surgery  [facility] - Med Surg ([facility]-)

## 2024-07-29 NOTE — CARE UPDATE
GENERAL SURGERY   CARE UPDATE    Patient is now s/p HIDA scan which was negative for acute cholecystitis.     We recommend ERCP with sphincterotomy for definitive management and risk reduction for future gallstone pancreatitis as her surgical risk is quite significant given poorly managed comorbidities.     She has a 20% risk of complication (including death) based on ACS NSQIP risk calculator which is far above average for laparoscopic cholecystectomy.    General surgery will sign off at this time. Please call us with any further questions or concerns.     Lucero Roman MD  General Surgery PGY-V  Pager [phone]

## 2024-07-29 NOTE — PLAN OF CARE
Problem: Adult Inpatient Plan of Care  Goal: Plan of Care Review  Outcome: Not Progressing  Goal: Optimal Comfort and Wellbeing  Outcome: Not Progressing     Problem: Pain Acute  Goal: Optimal Pain Control and Function  Outcome: Progressing     Problem: Infection  Goal: Absence of Infection Signs and Symptoms  Outcome: Not Progressing

## 2024-07-29 NOTE — ANESTHESIA PREPROCEDURE EVALUATION
[facility]  Anesthesia Pre-Operative Evaluation         Patient Name: Misa Barajas  YOB: 1945  MRN: [id number]    SUBJECTIVE:     Pre-operative evaluation for Procedure(s) (LRB):  ERCP (ENDOSCOPIC RETROGRADE CHOLANGIOPANCREATOGRAPHY) (N/A)     07/29/2024    Misa Barajas is a 78 y.o. female w/ a significant PMHx of HTN, HLD, HFpEF, COPD (on 2L NC baseline O2 sat 92%) and pAF (Eliquis, last dose 7/26), CKD III who presents for evaluation of RUQ and epigastric abdominal pain. Imaging concerning for biliary pancreatitis.    Patient now presents for the above procedure(s).    Echo Summary  Results for orders placed during the hospital encounter of 05/07/24    Echo    Interpretation Summary    Left Ventricle: The left ventricle is normal in size. Ventricular mass is normal. Normal wall thickness. Normal wall motion. There is normal systolic function. Ejection fraction by visual approximation is 55%. Unable to assess diastolic function due to atrial fibrillation.    Right Ventricle: Normal right ventricular cavity size. Wall thickness is normal. Systolic function is mildly reduced.    Aortic Valve: The aortic valve is a trileaflet valve. There is moderate aortic valve sclerosis. There is annular calcification present. Mildly restricted motion. No stenosis.    Tricuspid Valve: There is mild to moderate regurgitation.    Pulmonary Artery: The estimated pulmonary artery systolic pressure is 44 mmHg.    IVC/SVC: Elevated venous pressure at 15 mmHg.       Prev airway:     Intubation     Date/Time: 6/17/2024 3:11 PM     Performed by: Nevin Sky CRNA  Authorized by: Baltazar Cavazos MD    Intubation:     Induction:  Intravenous    Intubated:  Postinduction    Mask Ventilation:  Easy mask    Attempts:  1    Attempted By:  CRNA    Difficult Airway Encountered?: No      Complications:  None    Airway Device:  Supraglottic airway/LMA    Airway Device Size:  4.0    Secured at:  The  lips    Placement Verified By:  Capnometry    Complicating Factors:  None    Findings Post-Intubation:  BS equal bilateral and atraumatic/condition of teeth unchanged    LDA:        Peripheral IV - Single Lumen 07/27/24 1630 18 G Anterior;Right Forearm (Active)   Site Assessment Clean;Dry;Intact;No redness;No swelling 07/28/24 0830   Extremity Assessment Distal to IV No redness;No swelling;No warmth 07/28/24 0830   Line Status Blood return noted;Flushed;Saline locked 07/28/24 0830   Dressing Status Clean;Dry;Intact 07/28/24 0830   Dressing Intervention Integrity maintained 07/28/24 0830   Dressing Change Due 07/31/24 07/27/24 1630   Site Change Due 07/31/24 07/27/24 1630   Reason Not Rotated Not due 07/28/24 0830   Number of days: 2       Female External Urinary Catheter w/ Suction 07/27/24 0614 (Active)   Skin no redness;no breakdown 07/28/24 0830   Tolerance no signs/symptoms of discomfort 07/28/24 0830   Suction Continuous suction at 60 mmHg 07/28/24 0830   Output (mL) 200 mL 07/29/24 0903   Number of days: 2       Drips: None documented.      Patient Active Problem List   Diagnosis    Nephrolithiasis    Essential hypertension    Secondary hyperparathyroidism of renal origin    Drug-induced polyneuropathy    Osteoarthritis of knee    Renal osteodystrophy    Aortic atherosclerosis    RBBB    Arthritis of facet joints at multiple vertebral levels    Breast calcification, right    CKD (chronic kidney disease) stage 3, GFR 30-59 ml/min    Calcified granuloma of lung    Overweight with body mass index (BMI) of 27 to 27.9 in adult    Varicose veins of both legs with edema    Acute on chronic diastolic heart failure    Paroxysmal atrial fibrillation    Hyperlipidemia    Centrilobular emphysema    Chronic respiratory failure with hypoxia    Chronic diastolic heart failure    Lymphedema of both lower extremities    Venous insufficiency of both lower extremities    Venous stasis dermatitis of both lower extremities     Right ureteral stone    Acute renal failure superimposed on stage 3b chronic kidney disease    Right sided hydroureteronephrosis    Chronic anticoagulation    Epigastric abdominal pain    Osteoporosis    COPD (chronic obstructive pulmonary disease)    Senile purpura    Hypokalemia    Hypomagnesemia    Unspecified inflammatory spondylopathy, multiple sites in spine    Diastolic CHF, chronic    Elevated troponin    Wound of right lower extremity    Acute cystitis    Urinary retention    Hypophosphatemia    Hypermagnesemia    Acute interstitial nephritis    Class 1 obesity due to excess calories with serious comorbidity and body mass index (BMI) of 30.0 to 30.9 in adult    Hypotension    Orthostatic hypotension    Typical atrial flutter    Acute on chronic congestive heart failure    Former heavy tobacco smoker    Cellulitis    Gram-negative bacteremia - Pasturella multocida    Anemia    Leucocytosis    Acute biliary pancreatitis    Edema       Review of patient's allergies indicates:   Allergen Reactions    Adhesive tape-silicones     Adhesive Rash     Pt states she removed a LATEX bandaid and the skin beneath was swollen and red. No other latex causes a reaction.       Current Inpatient Medications:   fluticasone furoate-vilanteroL  1 puff Inhalation Daily    furosemide  40 mg Oral Daily    metoprolol succinate  100 mg Oral Daily    polyethylene glycol  17 g Oral Daily       No current facility-administered medications on file prior to encounter.     Current Outpatient Medications on File Prior to Encounter   Medication Sig Dispense Refill    acetaminophen (TYLENOL) 500 MG tablet Take 2 tablets (1,000 mg total) by mouth every 8 (eight) hours as needed for Pain. (Patient taking differently: Take 1,000 mg by mouth daily as needed for Pain.) 42 tablet 0    albuterol (PROVENTIL HFA) 90 mcg/actuation inhaler Inhale 2 puffs into the lungs every 6 (six) hours as needed for Wheezing. Rescue (Patient taking differently:  Inhale 2 puffs into the lungs daily as needed for Wheezing. Rescue) 18 g 3    apixaban (ELIQUIS) 2.5 mg Tab Take 1 tablet (2.5 mg total) by mouth 2 (two) times a day. 60 tablet 11    aspirin (ECOTRIN) 81 MG EC tablet Take 81 mg by mouth once daily.      biotin 1 mg tablet Take 1,000 mcg by mouth once daily.      budesonide-glycopyr-formoterol (BREZTRI AEROSPHERE) 160-9-4.8 mcg/actuation HFAA Inhale 2 puffs into the lungs 2 (two) times daily. 32.1 g 3    busPIRone (BUSPAR) 5 MG Tab Take 5 mg by mouth daily as needed (anxiety).      fluticasone propionate (FLONASE) 50 mcg/actuation nasal spray 1 spray by Each Nostril route daily as needed for Rhinitis or Allergies.      furosemide (LASIX) 40 MG tablet Take 1 tablet (40 mg total) by mouth once daily. 30 tablet 11    metoprolol succinate (TOPROL-XL) 100 MG 24 hr tablet Take 1 tablet (100 mg total) by mouth once daily. 90 tablet 3    vitamin D (VITAMIN D3) 1000 units Tab Take 2 tablets (2,000 Units total) by mouth once daily. (Patient taking differently: Take 1,000 Units by mouth once daily.) 180 tablet 3    inhalation spacing device Use as directed for inhalation. 1 Device 0       Past Surgical History:   Procedure Laterality Date    ABLATION, ATRIAL FLUTTER, TYPICAL N/A 6/17/2024    Procedure: Ablation, Atrial Flutter, Typical;  Surgeon: Taz Church MD;  Location: [facility] EP LAB;  Service: Cardiology;  Laterality: N/A;  AFL, RFA, LORENE, MAKAYLA (cx if SR), anes, MB, 3 Prep    BREAST BIOPSY Left 2002    core bx, +    BREAST BIOPSY Right 2018    core    BREAST BIOPSY Right 2019    BREAST LUMPECTOMY Left 2002    CYSTOSCOPY  4/28/2022    Procedure: CYSTOSCOPY;  Surgeon: Vik Ware MD;  Location: [facility] OR [address] [place] [address];  Service: Urology;;    CYSTOSCOPY  5/30/2022    Procedure: CYSTOSCOPY;  Surgeon: Bonnie Knutson MD;  Location: [facility] OR [address] [place] [address];  Service: Urology;;    ECHOCARDIOGRAM,TRANSESOPHAGEAL N/A 6/17/2024    Procedure: Transesophageal echo (MAKAYLA)  intra-procedure log documentation;  Surgeon: Nestor Martinez MD;  Location: [facility] EP LAB;  Service: Cardiology;  Laterality: N/A;    LASER LITHOTRIPSY  5/30/2022    Procedure: LITHOTRIPSY, USING LASER;  Surgeon: Bonnie Knutson MD;  [facility];  Service: Urology;;    LITHOTRIPSY      MASTECTOMY Left 06/2002    left-& lymph node dissection    REPLACEMENT OF STENT Right 5/30/2022    Procedure: REPLACEMENT, STENT;  Surgeon: Bonnie Knutson MD;  [facility];  Service: Urology;  Laterality: Right;    RETROGRADE PYELOGRAPHY Right 4/28/2022    Procedure: PYELOGRAM, RETROGRADE;  Surgeon: Vik Ware MD;  Location: [facility] OR [address] [place] [address];  Service: Urology;  Laterality: Right;    ROBOT-ASSISTED LAPAROSCOPIC PARTIAL NEPHRECTOMY USING DA JESÚS XI Right 3/11/2021    Procedure: XI ROBOTIC NEPHRECTOMY, PARTIAL;  Surgeon: Taz Ortega MD;  Location: [facility] OR 2ND FLR;  Service: Urology;  Laterality: Right;  4hr/ gen with regional  [place] confirmation:  [id number] for 11:15am case NC    URETEROSCOPIC REMOVAL OF URETERIC CALCULUS Right 5/30/2022    Procedure: REMOVAL, CALCULUS, URETER, URETEROSCOPIC;  Surgeon: Bonnie Knutson MD;  Location: [facility] OR [address] [place] [address];  Service: Urology;  Laterality: Right;    ureteroscopy Bilateral     6.14    URETEROSCOPY Right 5/30/2022    Procedure: URETEROSCOPY;  Surgeon: Bonnie Knutson MD;  Location: [facility] OR [address] [place] [address];  Service: Urology;  Laterality: Right;       Social History:  Tobacco Use: Medium Risk (7/26/2024)    Patient History     Smoking Tobacco Use: Former     Smokeless Tobacco Use: Former     Passive Exposure: Not on file      Alcohol Use: Not At Risk (7/27/2024)    AUDIT-C     Frequency of Alcohol Consumption: Never     Average Number of Drinks: Patient does not drink     Frequency of Binge Drinking: Never        OBJECTIVE:     Vital Signs Range (Last 24H):  Temp:  [36.3 °C (97.4 °F)-37.2 °C (98.9 °F)]   Pulse:  [73-80]   Resp:   [16-19]   BP: (144-157)/(66-94)   SpO2:  [92 %-97 %]       Significant Labs:  Lab Results   Component Value Date    WBC 19.41 (H) 07/29/2024    HGB 11.2 (L) 07/29/2024    HCT 35.4 (L) 07/29/2024     07/29/2024    CHOL 119 (L) 07/27/2024    TRIG 46 07/27/2024    HDL 52 07/27/2024    ALT 56 (H) 07/28/2024    AST 81 (H) 07/28/2024     07/28/2024    K 3.8 07/28/2024     07/28/2024    CREATININE 1.2 07/28/2024    BUN 20 07/28/2024    CO2 24 07/28/2024    TSH 4.367 (H) 06/26/2021    INR 1.1 07/26/2024    HGBA1C 5.3 05/08/2024       Diagnostic Studies: No relevant studies.    EKG:   Results for orders placed or performed during the hospital encounter of 07/26/24   EKG 12-lead    Collection Time: 07/26/24 10:20 PM   Result Value Ref Range    QRS Duration 166 ms    OHS QTC Calculation 528 ms    Narrative    Test Reason : M54.9,    Vent. Rate : 080 BPM     Atrial Rate : 080 BPM     P-R Int : 244 ms          QRS Dur : 166 ms      QT Int : 458 ms       P-R-T Axes : 079 -63 080 degrees     QTc Int : 528 ms    Sinus rhythm with 1st degree A-V block with occasional Premature  ventricular complexes  Left axis deviation  Right bundle branch block  Abnormal R wave progression in the precordial leads  Abnormal ECG  When compared with ECG of 12-JUL-2024 14:04,  Premature ventricular complexes are now Present  Nonspecific T wave abnormality no longer evident in Inferior leads  QT has lengthened  Confirmed by Nestor MCCLENDON MD ([id number]) on 7/27/2024 10:05:48 AM    Referred By: [email or web address]   SELF           Confirmed By:Nestor MCCLENDON MD       2D ECHO:  TTE:  Results for orders placed or performed during the hospital encounter of 05/07/24   Echo   Result Value Ref Range    BSA 1.94 m2    LVOT stroke volume 54.48 cm3    LVIDd 4.4 3.5 - 6.0 cm    LV Systolic Volume 22.88 mL    LV Systolic Volume Index 12.2 mL/m2    LVIDs 2.53 2.1 - 4.0 cm    LV Diastolic Volume 48.73 mL    LV Diastolic Volume Index 25.92 mL/m2    IVS 0.9 0.6 - 1.1 cm     LVOT diameter 2.00 cm    LVOT area 3.1 cm2    FS 43 28 - 44 %    Left Ventricle Relative Wall Thickness 0.41 cm    Posterior Wall 0.9 0.6 - 1.1 cm    LV mass 128.02 g    LV Mass Index 68 g/m2    MV Peak E Norberto 1.35 m/s    TDI LATERAL 0.08 m/s    TDI SEPTAL 0.05 m/s    E/E' ratio 20.77 m/s    MV Peak A Norberto 0.88 m/s    TR Max Norberto 2.7 m/s    E/A ratio 1.53     E wave deceleration time 144.63 msec    LV SEPTAL E/E' RATIO 27.00 m/s    LA Volume Index 33.2 mL/m2    LV LATERAL E/E' RATIO 16.88 m/s    LA volume 62.49 cm3    LVOT peak norberto 0.84 m/s    RVDD 3.95 cm    TAPSE 1.24 cm    RV/LV Ratio 0.90 cm    LA size 3.89 cm    Left Atrium Minor Axis 4.88 cm    Left Atrium Major Axis 5.07 cm    LA volume (mod) 63.00 cm3    LA WIDTH 3.80 cm    LA Volume Index (Mod) 33.5 mL/m2    RA Major Axis 4.85 cm    RA Width 2.51 cm    AV mean gradient 3 mmHg    AV peak gradient 6 mmHg    Ao peak norberto 1.20 m/s    Ao VTI 25.40 cm    LVOT peak VTI 17.35 cm    AV valve area 2.14 cm²    AV Velocity Ratio 0.70     AV index (prosthetic) 0.68     KHOI by Velocity Ratio 2.20 cm²    MV mean gradient 3 mmHg    MV peak gradient 8 mmHg    MV stenosis pressure 1/2 time 41.94 ms    MV valve area p 1/2 method 5.25 cm2    MV valve area by continuity eq 1.65 cm2    MV VTI 33.09 cm    Triscuspid Valve Regurgitation Peak Gradient 29 mmHg    Sinus 2.89 cm    STJ 2.17 cm    Ascending aorta 2.81 cm    Mean e' 0.07 m/s    ZLVIDS -1.95     ZLVIDD -1.77     TV resting pulmonary artery pressure 44 mmHg    RV TB RVSP 18 mmHg    Est. RA pres 15 mmHg    EF 55 %    Mitral Valve Heart Rate 69 bpm    Narrative      Left Ventricle: The left ventricle is normal in size. Ventricular mass   is normal. Normal wall thickness. Normal wall motion. There is normal   systolic function. Ejection fraction by visual approximation is 55%.   Unable to assess diastolic function due to atrial fibrillation.    Right Ventricle: Normal right ventricular cavity size. Wall thickness   is normal.  Systolic function is mildly reduced.    Aortic Valve: The aortic valve is a trileaflet valve. There is moderate   aortic valve sclerosis. There is annular calcification present. Mildly   restricted motion. No stenosis.    Tricuspid Valve: There is mild to moderate regurgitation.    Pulmonary Artery: The estimated pulmonary artery systolic pressure is   44 mmHg.    IVC/SVC: Elevated venous pressure at 15 mmHg.         MAKAYLA:  Results for orders placed or performed during the hospital encounter of 06/17/24   Transesophageal echo (MAKAYLA)   Result Value Ref Range    BSA 1.84 m2    Narrative      MAKAYLA prior to ablation. Limited study given pateint instabliity. No   LA/NANI thrombus visualized. NANI velocity of 50 cm/s. Ejection fraction of   55-60%. Small to trivial anterior effusion.         ASSESSMENT/PLAN:       Pre-op Assessment    I have reviewed the Patient Summary Reports.     I have reviewed the Nursing Notes.    I have reviewed the Medications.     Review of Systems  Anesthesia Hx:  No problems with previous Anesthesia   History of prior surgery of interest to airway management or planning:  Previous anesthesia: General          Denies Personal Hx of Anesthesia complications.                    Cardiovascular:     Hypertension    Dysrhythmias   CHF              Congestive Heart Failure (CHF)                Hypertension     Disorder of Cardiac Rhythm, Atrial Fibrillation     Pulmonary:   COPD Asthma       Asthma:   Chronic Obstructive Pulmonary Disease (COPD):                      Renal/:  Chronic Renal Disease        Kidney Function/Disease             Musculoskeletal:  Arthritis               Neurological:    Neuromuscular Disease,                                 Neuromuscular Disease       Physical Exam  General: Well nourished, Cooperative, Alert and Oriented    Airway:  Mallampati: II   Mouth Opening: Normal  TM Distance: Normal  Tongue: Normal  Neck ROM: Normal ROM    Dental:  Intact, Caps /  Implants        Anesthesia Plan  Type of Anesthesia, risks & benefits discussed:    Anesthesia Type: Gen ETT  Intra-op Monitoring Plan: Standard ASA Monitors  Post Op Pain Control Plan: multimodal analgesia and IV/PO Opioids PRN  Induction:  IV  Airway Plan: Direct and Video  Informed Consent: Informed consent signed with the Patient and all parties understand the risks and agree with anesthesia plan.  All questions answered.   ASA Score: 3  Day of Surgery Review of History & Physical: H&P Update referred to the surgeon/provider.    Ready For Surgery From Anesthesia Perspective.     .

## 2024-07-29 NOTE — PLAN OF CARE
Problem: Adult Inpatient Plan of Care  Goal: Plan of Care Review  Outcome: Not Progressing  Goal: Optimal Comfort and Wellbeing  Outcome: Not Progressing     Problem: Infection  Goal: Absence of Infection Signs and Symptoms  Outcome: Not Progressing

## 2024-07-30 ENCOUNTER — PATIENT OUTREACH (OUTPATIENT)
Dept: ADMINISTRATIVE | Facility: HOSPITAL | Age: 79
End: 2024-07-30
Payer: MEDICARE

## 2024-07-30 ENCOUNTER — ANESTHESIA (OUTPATIENT)
Dept: ENDOSCOPY | Facility: HOSPITAL | Age: 79
End: 2024-07-30
Payer: MEDICARE

## 2024-07-30 LAB
ALBUMIN SERPL BCP-MCNC: 2 G/DL (ref 3.5–5.2)
ALP SERPL-CCNC: 133 U/L (ref 55–135)
ALT SERPL W/O P-5'-P-CCNC: 37 U/L (ref 10–44)
ANION GAP SERPL CALC-SCNC: 10 MMOL/L (ref 8–16)
AST SERPL-CCNC: 38 U/L (ref 10–40)
BASOPHILS # BLD AUTO: 0.09 K/UL (ref 0–0.2)
BASOPHILS NFR BLD: 0.4 % (ref 0–1.9)
BILIRUB SERPL-MCNC: 1.1 MG/DL (ref 0.1–1)
BUN SERPL-MCNC: 24 MG/DL (ref 8–23)
CALCIUM SERPL-MCNC: 9.9 MG/DL (ref 8.7–10.5)
CHLORIDE SERPL-SCNC: 105 MMOL/L (ref 95–110)
CO2 SERPL-SCNC: 24 MMOL/L (ref 23–29)
CREAT SERPL-MCNC: 1.5 MG/DL (ref 0.5–1.4)
DIFFERENTIAL METHOD BLD: ABNORMAL
EOSINOPHIL # BLD AUTO: 0.1 K/UL (ref 0–0.5)
EOSINOPHIL NFR BLD: 0.6 % (ref 0–8)
ERYTHROCYTE [DISTWIDTH] IN BLOOD BY AUTOMATED COUNT: 14.5 % (ref 11.5–14.5)
EST. GFR  (NO RACE VARIABLE): 35 ML/MIN/1.73 M^2
GLUCOSE SERPL-MCNC: 95 MG/DL (ref 70–110)
HCT VFR BLD AUTO: 36.6 % (ref 37–48.5)
HGB BLD-MCNC: 11.2 G/DL (ref 12–16)
IMM GRANULOCYTES # BLD AUTO: 0.16 K/UL (ref 0–0.04)
IMM GRANULOCYTES NFR BLD AUTO: 0.8 % (ref 0–0.5)
LYMPHOCYTES # BLD AUTO: 1.5 K/UL (ref 1–4.8)
LYMPHOCYTES NFR BLD: 7.6 % (ref 18–48)
MAGNESIUM SERPL-MCNC: 1.7 MG/DL (ref 1.6–2.6)
MCH RBC QN AUTO: 28.4 PG (ref 27–31)
MCHC RBC AUTO-ENTMCNC: 30.6 G/DL (ref 32–36)
MCV RBC AUTO: 93 FL (ref 82–98)
MONOCYTES # BLD AUTO: 1.9 K/UL (ref 0.3–1)
MONOCYTES NFR BLD: 9.7 % (ref 4–15)
NEUTROPHILS # BLD AUTO: 16.2 K/UL (ref 1.8–7.7)
NEUTROPHILS NFR BLD: 80.9 % (ref 38–73)
NRBC BLD-RTO: 0 /100 WBC
PLATELET # BLD AUTO: 269 K/UL (ref 150–450)
PMV BLD AUTO: 9.8 FL (ref 9.2–12.9)
POTASSIUM SERPL-SCNC: 3.3 MMOL/L (ref 3.5–5.1)
PROT SERPL-MCNC: 5.9 G/DL (ref 6–8.4)
RBC # BLD AUTO: 3.95 M/UL (ref 4–5.4)
SODIUM SERPL-SCNC: 139 MMOL/L (ref 136–145)
WBC # BLD AUTO: 20.07 K/UL (ref 3.9–12.7)

## 2024-07-30 PROCEDURE — 63600175 PHARM REV CODE 636 W HCPCS

## 2024-07-30 PROCEDURE — 21400001 HC TELEMETRY ROOM

## 2024-07-30 PROCEDURE — 27201674 HC SPHINCTERTOME: Performed by: INTERNAL MEDICINE

## 2024-07-30 PROCEDURE — 43264 ERCP REMOVE DUCT CALCULI: CPT | Mod: ,,, | Performed by: INTERNAL MEDICINE

## 2024-07-30 PROCEDURE — 74328 X-RAY BILE DUCT ENDOSCOPY: CPT | Mod: 26,,, | Performed by: INTERNAL MEDICINE

## 2024-07-30 PROCEDURE — 43262 ENDO CHOLANGIOPANCREATOGRAPH: CPT | Performed by: INTERNAL MEDICINE

## 2024-07-30 PROCEDURE — 25000003 PHARM REV CODE 250

## 2024-07-30 PROCEDURE — C1769 GUIDE WIRE: HCPCS | Performed by: INTERNAL MEDICINE

## 2024-07-30 PROCEDURE — 63600175 PHARM REV CODE 636 W HCPCS: Performed by: NURSE ANESTHETIST, CERTIFIED REGISTERED

## 2024-07-30 PROCEDURE — 25000003 PHARM REV CODE 250: Performed by: NURSE ANESTHETIST, CERTIFIED REGISTERED

## 2024-07-30 PROCEDURE — 43262 ENDO CHOLANGIOPANCREATOGRAPH: CPT | Mod: 51,,, | Performed by: INTERNAL MEDICINE

## 2024-07-30 PROCEDURE — 83735 ASSAY OF MAGNESIUM: CPT

## 2024-07-30 PROCEDURE — 43264 ERCP REMOVE DUCT CALCULI: CPT | Performed by: INTERNAL MEDICINE

## 2024-07-30 PROCEDURE — 37000009 HC ANESTHESIA EA ADD 15 MINS: Performed by: INTERNAL MEDICINE

## 2024-07-30 PROCEDURE — 0FC98ZZ EXTIRPATION OF MATTER FROM COMMON BILE DUCT, VIA NATURAL OR ARTIFICIAL OPENING ENDOSCOPIC: ICD-10-PCS | Performed by: INTERNAL MEDICINE

## 2024-07-30 PROCEDURE — 94640 AIRWAY INHALATION TREATMENT: CPT

## 2024-07-30 PROCEDURE — 36415 COLL VENOUS BLD VENIPUNCTURE: CPT

## 2024-07-30 PROCEDURE — 85025 COMPLETE CBC W/AUTO DIFF WBC: CPT

## 2024-07-30 PROCEDURE — 74328 X-RAY BILE DUCT ENDOSCOPY: CPT | Mod: TC | Performed by: INTERNAL MEDICINE

## 2024-07-30 PROCEDURE — 27202125 HC BALLOON, EXTRACTION (ANY): Performed by: INTERNAL MEDICINE

## 2024-07-30 PROCEDURE — 37000008 HC ANESTHESIA 1ST 15 MINUTES: Performed by: INTERNAL MEDICINE

## 2024-07-30 PROCEDURE — 80053 COMPREHEN METABOLIC PANEL: CPT

## 2024-07-30 PROCEDURE — 25500020 PHARM REV CODE 255: Performed by: INTERNAL MEDICINE

## 2024-07-30 RX ORDER — SODIUM CHLORIDE, SODIUM LACTATE, POTASSIUM CHLORIDE, CALCIUM CHLORIDE 600; 310; 30; 20 MG/100ML; MG/100ML; MG/100ML; MG/100ML
INJECTION, SOLUTION INTRAVENOUS CONTINUOUS
Status: ACTIVE | OUTPATIENT
Start: 2024-07-30 | End: 2024-07-31

## 2024-07-30 RX ORDER — SODIUM CHLORIDE 0.9 % (FLUSH) 0.9 %
10 SYRINGE (ML) INJECTION
Status: DISCONTINUED | OUTPATIENT
Start: 2024-07-30 | End: 2024-07-30 | Stop reason: HOSPADM

## 2024-07-30 RX ORDER — ONDANSETRON HYDROCHLORIDE 2 MG/ML
INJECTION, SOLUTION INTRAVENOUS
Status: DISCONTINUED | OUTPATIENT
Start: 2024-07-30 | End: 2024-07-30

## 2024-07-30 RX ORDER — GLUCAGON 1 MG
1 KIT INJECTION
Status: DISCONTINUED | OUTPATIENT
Start: 2024-07-30 | End: 2024-07-30 | Stop reason: HOSPADM

## 2024-07-30 RX ORDER — ONDANSETRON HYDROCHLORIDE 2 MG/ML
4 INJECTION, SOLUTION INTRAVENOUS DAILY PRN
Status: DISCONTINUED | OUTPATIENT
Start: 2024-07-30 | End: 2024-07-30 | Stop reason: HOSPADM

## 2024-07-30 RX ORDER — POTASSIUM CHLORIDE 7.45 MG/ML
10 INJECTION INTRAVENOUS
Status: DISCONTINUED | OUTPATIENT
Start: 2024-07-30 | End: 2024-07-30

## 2024-07-30 RX ORDER — SUCCINYLCHOLINE CHLORIDE 20 MG/ML
INJECTION INTRAMUSCULAR; INTRAVENOUS
Status: DISCONTINUED | OUTPATIENT
Start: 2024-07-30 | End: 2024-07-30

## 2024-07-30 RX ORDER — FENTANYL CITRATE 50 UG/ML
INJECTION, SOLUTION INTRAMUSCULAR; INTRAVENOUS
Status: DISCONTINUED | OUTPATIENT
Start: 2024-07-30 | End: 2024-07-30

## 2024-07-30 RX ORDER — PROPOFOL 10 MG/ML
VIAL (ML) INTRAVENOUS
Status: DISCONTINUED | OUTPATIENT
Start: 2024-07-30 | End: 2024-07-30

## 2024-07-30 RX ORDER — ROCURONIUM BROMIDE 10 MG/ML
INJECTION, SOLUTION INTRAVENOUS
Status: DISCONTINUED | OUTPATIENT
Start: 2024-07-30 | End: 2024-07-30

## 2024-07-30 RX ORDER — LIDOCAINE HYDROCHLORIDE 20 MG/ML
INJECTION INTRAVENOUS
Status: DISCONTINUED | OUTPATIENT
Start: 2024-07-30 | End: 2024-07-30

## 2024-07-30 RX ORDER — FENTANYL CITRATE 50 UG/ML
25 INJECTION, SOLUTION INTRAMUSCULAR; INTRAVENOUS EVERY 5 MIN PRN
Status: DISCONTINUED | OUTPATIENT
Start: 2024-07-30 | End: 2024-07-30 | Stop reason: HOSPADM

## 2024-07-30 RX ADMIN — SODIUM CHLORIDE, POTASSIUM CHLORIDE, SODIUM LACTATE AND CALCIUM CHLORIDE: 600; 310; 30; 20 INJECTION, SOLUTION INTRAVENOUS at 02:07

## 2024-07-30 RX ADMIN — ROCURONIUM BROMIDE 25 MG: 10 INJECTION INTRAVENOUS at 12:07

## 2024-07-30 RX ADMIN — FENTANYL CITRATE 100 MCG: 50 INJECTION, SOLUTION INTRAMUSCULAR; INTRAVENOUS at 11:07

## 2024-07-30 RX ADMIN — ROCURONIUM BROMIDE 5 MG: 10 INJECTION INTRAVENOUS at 11:07

## 2024-07-30 RX ADMIN — OXYCODONE HYDROCHLORIDE 5 MG: 5 TABLET ORAL at 03:07

## 2024-07-30 RX ADMIN — SODIUM CHLORIDE: 0.9 INJECTION, SOLUTION INTRAVENOUS at 11:07

## 2024-07-30 RX ADMIN — SENNOSIDES 8.6 MG: 8.6 TABLET, FILM COATED ORAL at 08:07

## 2024-07-30 RX ADMIN — POTASSIUM CHLORIDE 10 MEQ: 7.46 INJECTION, SOLUTION INTRAVENOUS at 09:07

## 2024-07-30 RX ADMIN — POTASSIUM CHLORIDE 10 MEQ: 7.46 INJECTION, SOLUTION INTRAVENOUS at 08:07

## 2024-07-30 RX ADMIN — SUGAMMADEX 200 MG: 100 INJECTION, SOLUTION INTRAVENOUS at 12:07

## 2024-07-30 RX ADMIN — FLUTICASONE FUROATE AND VILANTEROL TRIFENATATE 1 PUFF: 100; 25 POWDER RESPIRATORY (INHALATION) at 08:07

## 2024-07-30 RX ADMIN — LIDOCAINE HYDROCHLORIDE 50 MG: 20 INJECTION INTRAVENOUS at 11:07

## 2024-07-30 RX ADMIN — PROPOFOL 110 MG: 10 INJECTION, EMULSION INTRAVENOUS at 11:07

## 2024-07-30 RX ADMIN — PIPERACILLIN SODIUM AND TAZOBACTAM SODIUM 4.5 G: 4; .5 INJECTION, POWDER, FOR SOLUTION INTRAVENOUS at 08:07

## 2024-07-30 RX ADMIN — FUROSEMIDE 40 MG: 20 TABLET ORAL at 08:07

## 2024-07-30 RX ADMIN — ONDANSETRON 4 MG: 2 INJECTION INTRAMUSCULAR; INTRAVENOUS at 12:07

## 2024-07-30 RX ADMIN — METOPROLOL SUCCINATE 100 MG: 50 TABLET, EXTENDED RELEASE ORAL at 08:07

## 2024-07-30 RX ADMIN — OXYCODONE HYDROCHLORIDE 5 MG: 5 TABLET ORAL at 11:07

## 2024-07-30 RX ADMIN — SUCCINYLCHOLINE CHLORIDE 100 MG: 20 INJECTION, SOLUTION INTRAMUSCULAR; INTRAVENOUS at 11:07

## 2024-07-30 RX ADMIN — HYDROMORPHONE HYDROCHLORIDE 0.5 MG: 1 INJECTION, SOLUTION INTRAMUSCULAR; INTRAVENOUS; SUBCUTANEOUS at 10:07

## 2024-07-30 NOTE — TREATMENT PLAN
AES Post-Procedure Note    S/p ERCP    Impression:                         - The major papilla very small and located                          partially within/on rim of a large diverticulum,                          which increased procedure's complexity and access.                          - Initially unable to visualize ampulla due to                          overlying food residue associated with large                          diverticulum, which required lavage.                          - Significantly low lying cystic duct insertion as                          well as a very low lying bifurcation with a very                          short common bile duct, all of which appeared                          moderately dilated but without an overt filling                          defect seen.                          - The cystic duct, entire main bile duct, left                          main hepatic duct and right main hepatic duct were                          moderately dilated.                          - A biliary sphincterotomy was performed.                          - The biliary tree was swept and small amounts of                          sludge/debris/stone particles were found.                          - The cystic duct was unable to be accessed with                          the wire and/or cannula despite several attempts                          given its dilated appearance.                          - Given clearance of the CBD, left and right main                          ducts was achieved during today's ERCP with                          sphincterotomy, no stents were placed as this was                          completed to reduce future risk of biliary                          pancreatitis since the patient was not offered a                          cholecystectomy due to high risk surgical                          candidacy.                          - Indomethacin given to decrease risk of post-ERCP                           pancreatitis.     Recommendation:                      - Continue antibiotics for next 3-5 days post ERCP.                          - Return patient to [facility] for ongoing care.                          - Resume previous diet.                          - Watch for pancreatitis, bleeding, perforation,                          and cholangitis.                          - Return to referring physician.                          - The patient should not require a repeat ERCP                          unless future concern for biliary obstruction                          develops.                          - If future recurrent episodes of gallstone                          pancreatitis occur despite today's ERCP with                          sphincterotomy, reconsider candidacy for                          cholecystectomy though understand at this time                          patient is higher risk.       Thank you for involving us in the care of Misa Barajas. Please call with any additional questions, concerns or changes in the patient's clinical status.    Miracle Pepper MD  Gastroenterology Fellow, PGY IV

## 2024-07-30 NOTE — ASSESSMENT & PLAN NOTE
Patient with Paroxysmal (<7 days) atrial fibrillation which is controlled currently with Beta Blocker. Patient is currently in sinus rhythm.[id number] Score: 3. Anticoagulation not indicated due to possible procedure .

## 2024-07-30 NOTE — ASSESSMENT & PLAN NOTE
Chronic, uncontrolled. Latest blood pressure and vitals reviewed-     Temp:  [98.1 °F (36.7 °C)-98.9 °F (37.2 °C)]   Pulse:  [73-80]   Resp:  [17-19]   BP: (134-157)/(63-94)   SpO2:  [92 %-96 %] .   Home meds for hypertension were reviewed and noted below.   Hypertension Medications               furosemide (LASIX) 40 MG tablet Take 1 tablet (40 mg total) by mouth once daily.    metoprolol succinate (TOPROL-XL) 100 MG 24 hr tablet Take 1 tablet (100 mg total) by mouth once daily.            While in the hospital, will manage blood pressure as follows; Continue home antihypertensive regimen    Will utilize p.r.n. blood pressure medication only if patient's blood pressure greater than 220/110 and she develops symptoms such as worsening chest pain or shortness of breath.

## 2024-07-30 NOTE — PROGRESS NOTES
Population Health Chart Review & Patient Outreach Details      Additional [facility] Health Notes:               Updates Requested / Reviewed:      Updated Care Coordination Note, Care Everywhere, and Immunizations Reconciliation Completed or Queried: [place]         Health Maintenance Topics Overdue:      VB Score: 1     Uncontrolled BP    RSV Vaccine                  Health Maintenance Topic(s) Outreach Outcomes & Actions Taken:    Provider Pt Reattribution - Outreach Outcomes & Actions Taken  : Upcoming Appt with Another Provider Already Scheduled

## 2024-07-30 NOTE — PLAN OF CARE
Problem: Acute Kidney Injury/Impairment  Goal: Fluid and Electrolyte Balance  Outcome: Progressing  Goal: Improved Oral Intake  Outcome: Progressing     Problem: Skin Injury Risk Increased  Goal: Skin Health and Integrity  Outcome: Progressing     Problem: Pain Acute  Goal: Optimal Pain Control and Function  Outcome: Progressing

## 2024-07-30 NOTE — PLAN OF CARE
[facility]  Discharge Reassessment    Primary Care Provider: Celia Carlisle MD    Expected Discharge Date: 7/31/2024    Reassessment (most recent)       Discharge Reassessment - 07/30/24 1252          Discharge Reassessment    Assessment Type Discharge Planning Reassessment (P)      Did the patient's condition or plan change since previous assessment? No (P)      Discharge Plan discussed with: Adult children (P)    Marina Gruber (Daughter)  [phone]    Communicated NOHEMI with patient/caregiver Yes (P)      Discharge Plan A Home with family;Home Health (P)    Resume [place] services    Discharge Plan B Home with family (P)      DME Needed Upon Discharge  none (P)      Transition of Care Barriers None (P)      Why the patient remains in the hospital Requires continued medical care (P)         Post-Acute Status    Post-Acute Authorization Other;Home Health (P)      Home Health Status Pending medical clearance/testing (P)      Coverage [employer] - [facility] MC[employer] ADV - (P)      Other Status Awaiting f/u Appts (P)                  Discharge Plan A and Plan B have been determined by review of patient's clinical status, future medical and therapeutic needs, and coverage/benefits for post-acute care in coordination with multidisciplinary team members.                     SULEIMAN Phillips, LMSW  [facility]  Case Management  Ext. [phone]

## 2024-07-30 NOTE — ASSESSMENT & PLAN NOTE
Patient with Paroxysmal (<7 days) atrial fibrillation which is controlled currently with Beta Blocker. Patient is currently in sinus rhythm.[id number] Score: 3. Anticoagulation not indicated due to possible procedure .

## 2024-07-30 NOTE — PROGRESS NOTES
[facility] ([address] Medicine  Progress Note    Patient Name: Misa Barajas  MRN: [id number]  Patient Class: IP- Inpatient   Admission Date: 7/26/2024  Length of Stay: 3 days  Attending Physician: Brea Casanova DO  Primary Care Provider: Celia Carlisle MD        Subjective:     Principal Problem:Acute biliary pancreatitis        HPI:  Misa Barajas is a 78F with a PMHx of HTN, HLD, HFpEF, COPD (on 2L NC baseline O2 sat 92%) and pAF who presented to [facility] 7/26 for acute onset abdominal pain that radiated to her back. States she has RUQ and epigastric pain that started acutely this afternoon. She rates her pain as 10/10 intensity, sharp and non-pleuritic in nature. She reports a past history of nephrolithiasis but states that this pain is different. Pain is not post-prandial in nature and explains that this is differen than her past episodes of nephrolithiasis. SOB started at around thesame time and she normally is on 2L/min of at home oxygen due to her COPD. She states that she was watching TV when the pain suddenly started. She denies nausea, vomiting, fever, chills, or LUTS including hamturia or dysuria. Recent admission 7/12-7/18 for pasturella bactermia d/t cat bite, completed 2wk CTX (EOT 7/26, midline removed 7/26). No recent changes in appetite reported. She also denies diarrhea, constipation.     In ED, Slightly hypertensive 172/83, no tachycardia, on 2L NC saturating 98%, afebrile. CBC with slightly worsened anemia (Hb 8.9, baseline seems approx 10), no leukocytosis. CMP with hypokalemia K 3.4, hypercalcemia (corrected 11.5), Alb 2.6, LFT elevation including T. Bili 1.1, AST//54, . Lipase >3000. , troponin normal. CTA (obtained for aortic dissection r/o) showed cholelithiasis Distended BG with pericholecystic fluid and cholelithiasis. No intrahepatic or extrahepatic biliary ductal dilatation. CTA showed cholelithiasis so we obtained a right upper  quadrant ultrasound as well that confirmed the cholelithiasis and could not necessarily rule out cholecystitis. Gave CTX and 1L IVF.     Overview/Hospital Course:  CTA Abdomen/pelvis (7/26/24) revealed non-obstruction renal calculi, cholelithiasis, possible early cholecystitis, no signs of choledocholithiasis, and stranding in the pancreas suggestive of pancreatits. MRCP on 7/28/24 showed cholelithiasis and pancreatitis without choledocolithiasis or cholecystitis. General surgery consulted and recommends a HIDA to definitively rule out Cholecystitis. If HIDA is negative, general surgery recommends an ERCP with sphincterotomy instead of a cholecystectomy due to co-morbidities increasing surgical risk. HIDA scan was negative for cholecystitis. Patient underwent an ERCP with sphincterotomy 7/30/24.    Interval History: Patient underwent an ERCP with sphincterotomy and is being continued on Zosyn. Patient's lab did show signs of an MARQUEZ. 75 mL/hr LR was given for 10 hours. Will evaluate pain levels and renal function tomorrow and see how she is progressing.     Review of Systems   Constitutional:  Negative for chills, diaphoresis and fever.   Respiratory:  Positive for shortness of breath. Negative for cough.         Patient requires 2L oxygen at home   Cardiovascular:  Positive for leg swelling. Negative for chest pain and palpitations.   Gastrointestinal:  Positive for abdominal pain. Negative for constipation, diarrhea, nausea and vomiting.        Pain mostly in upper quadrants, radiates  to back. Some tenderness in lower quadrants   Genitourinary:  Negative for difficulty urinating and dysuria.   Musculoskeletal:  Negative for arthralgias and myalgias.   Skin:  Negative for color change.   Neurological:  Negative for dizziness and headaches.     Objective:     Vital Signs (Most Recent):  Temp: 97.7 °F (36.5 °C) (07/30/24 1625)  Pulse: 80 (07/30/24 1625)  Resp: 18 (07/30/24 1625)  BP: 138/73 (07/30/24 1625)  SpO2: (!)  91 % (07/30/24 1625) Vital Signs (24h Range):  Temp:  [96.2 °F (35.7 °C)-98.6 °F (37 °C)] 97.7 °F (36.5 °C)  Pulse:  [63-86] 80  Resp:  [12-22] 18  SpO2:  [91 %-98 %] 91 %  BP: (132-202)/(60-89) 138/73     Weight: 77.1 kg (169 lb 15.6 oz)  Body mass index is 29.18 kg/m².    Intake/Output Summary (Last 24 hours) at 7/30/2024 1732  Last data filed at 7/30/2024 1254  Gross per 24 hour   Intake 1120 ml   Output --   Net 1120 ml         Physical Exam  Constitutional:       General: She is not in acute distress.     Appearance: Normal appearance. She is not ill-appearing.   HENT:      Head: Normocephalic and atraumatic.   Eyes:      General: No scleral icterus.     Conjunctiva/sclera: Conjunctivae normal.   Cardiovascular:      Rate and Rhythm: Normal rate and regular rhythm.      Pulses: Normal pulses.      Heart sounds: Normal heart sounds.   Pulmonary:      Effort: Pulmonary effort is normal.      Breath sounds: Normal breath sounds.   Abdominal:      General: Abdomen is flat. There is no distension.      Palpations: Abdomen is soft.      Tenderness: There is abdominal tenderness. There is no rebound.      Comments: Most tender in epigastric and RUQ and epigastric regions upon palpation. Some tenderness in LRQ and LLQ   Musculoskeletal:         General: Swelling present. Normal range of motion.      Cervical back: Normal range of motion and neck supple.      Comments: Mild pitting edema in lower extremities bilaterally    Skin:     General: Skin is warm and dry.      Capillary Refill: Capillary refill takes less than 2 seconds.      Coloration: Skin is not jaundiced.   Neurological:      Mental Status: She is alert and oriented to person, place, and time.   Psychiatric:         Mood and Affect: Mood normal.         Behavior: Behavior normal.             Significant Labs: All pertinent labs within the past 24 hours have been reviewed.    Significant Imaging: I have reviewed all pertinent imaging results/findings within  the past 24 hours.    Assessment/Plan:      * Acute biliary pancreatitis  Sudden onset abdominal pain not a/w eating c/f gallstone pancreatitis (given cholelithiasis seen on CTA and US). No fever, chills, or other s/s infection to suggest cholangitis. Does have elevated T bili and AST/ALT. Severe abdominal pain. No nausea or vomiting. No acute alcohol intake. Slight hypercalcemia of unknown etiology, however not profound enough to explain acute pancreatitis. Lipase >3000. CT AP with peripancreatic stranding and edema, greatest about the head and neck, favored to be related to pancreatitis. Location of pain plus CT findings and lab findings do not suggest nephrolithiasis as etiology.     - AES consulted given LFT elevation, appreciate recommendations  - MRCP to better delineate biliary obstruction  - No antibiotics as low concern for cholangitis, would start Zosyn if febrile or becoming hypotensive  - cautious with fluids given CHF (no signs of decompensation, got 1L in ED)  - analgesic therapy  -General surgery recommends HIDA to definitively r/o cholecystitis  -If HIDA negative, General Surgery recommends ERCP w/ sphincterotomy due to co-morbidities increasing surgical risk   -Gastroenterology states cholecystectomy is best first line treatment, but an ERCP with sphincterectomy is a good second line treatment if cholecystectomy cannot be done    -HIDA was negative for cholecystitis;     ERCP with sphincterotomy performed by [facility]      COPD (chronic obstructive pulmonary disease)  Patient's COPD is controlled currently.  Patient is currently off COPD Pathway. Continue scheduled inhalers Supplemental oxygen and monitor respiratory status closely.     No wheezing, no s/s respiratory illness. On home 2L NC.     - No Brezztri on formulary, started on breo    Chronic diastolic heart failure  No signs of decompensation. Stable RLE edema. No orthopnea, no rales.    - Continue home metoprolol    -Holding lasix due to  MARQUEZ      Paroxysmal atrial fibrillation  Patient with Paroxysmal (<7 days) atrial fibrillation which is controlled currently with Beta Blocker. Patient is currently in sinus rhythm.[id number] Score: 3. Anticoagulation not indicated due to possible procedure .    CKD (chronic kidney disease) stage 3, GFR 30-59 ml/min  Creatine stable for now. BMP reviewed- noted Estimated Creatinine Clearance: 30.1 mL/min (A) (based on SCr of 1.5 mg/dL (H)). according to latest data. Based on current GFR, CKD stage is stage 3 - GFR 30-59.  Monitor UOP and serial BMP and adjust therapy as needed. Renally dose meds. Avoid nephrotoxic medications and procedures.    Holding Lasix due to labs showing MARQUEZ.     Secondary hyperparathyroidism of renal origin  Causing slight hypercalcemia.     - Check calcitriol   - CTM CMP      Essential hypertension  Chronic, uncontrolled. Latest blood pressure and vitals reviewed-     Temp:  [96.2 °F (35.7 °C)-98.6 °F (37 °C)]   Pulse:  [63-86]   Resp:  [12-22]   BP: (132-202)/(60-89)   SpO2:  [91 %-98 %] .   Home meds for hypertension were reviewed and noted below.   Hypertension Medications               furosemide (LASIX) 40 MG tablet Take 1 tablet (40 mg total) by mouth once daily.    metoprolol succinate (TOPROL-XL) 100 MG 24 hr tablet Take 1 tablet (100 mg total) by mouth once daily.            While in the hospital, will manage blood pressure as follows; Continue home antihypertensive regimen    Currently holding Lasix due to MARQUEZ    Will utilize p.r.n. blood pressure medication only if patient's blood pressure greater than 220/110 and she develops symptoms such as worsening chest pain or shortness of breath.      VTE Risk Mitigation (From admission, onward)           Ordered     Reason for No Pharmacological VTE Prophylaxis  Once        Comments: Anticipate possible procerdure   Question:  Reasons:  Answer:  Physician Provided (leave comment)    07/27/24 0430     IP VTE HIGH RISK PATIENT  Once          07/27/24 0430     Place sequential compression device  Until discontinued         07/27/24 0430                    Discharge Planning   NOHEMI: 7/31/2024     Code Status: Full Code   Is the patient medically ready for discharge?:     Reason for patient still in hospital (select all that apply): Patient trending condition and Treatment  Discharge Plan A: Home with family, Home Health (Resume [facility] services)                  Karey Liang MD  Department of Hospital Medicine   [facility] Surg ([place])

## 2024-07-30 NOTE — ASSESSMENT & PLAN NOTE
Sudden onset abdominal pain not a/w eating c/f gallstone pancreatitis (given cholelithiasis seen on CTA and US). No fever, chills, or other s/s infection to suggest cholangitis. Does have elevated T bili and AST/ALT. Severe abdominal pain. No nausea or vomiting. No acute alcohol intake. Slight hypercalcemia of unknown etiology, however not profound enough to explain acute pancreatitis. Lipase >3000. CT AP with peripancreatic stranding and edema, greatest about the head and neck, favored to be related to pancreatitis. Location of pain plus CT findings and lab findings do not suggest nephrolithiasis as etiology.     - AES consulted given LFT elevation, appreciate recommendations  - MRCP to better delineate biliary obstruction  - No antibiotics as low concern for cholangitis, would start Zosyn if febrile or becoming hypotensive  - cautious with fluids given CHF (no signs of decompensation, got 1L in ED)  - analgesic therapy  -General surgery recommends HIDA to definitively r/o cholecystitis  -If HIDA negative, General Surgery recommends ERCP w/ sphincterotomy due to co-morbidities increasing surgical risk   -Gastroenterology states cholecystectomy is best first line treatment, but an ERCP with sphincterectomy is a good second line treatment if cholecystectomy cannot be done    -HIDA was negative for cholecystitis; NPO for possible ERCP with sphincterotomy tomorrow

## 2024-07-30 NOTE — ASSESSMENT & PLAN NOTE
Chronic, uncontrolled. Latest blood pressure and vitals reviewed-     Temp:  [96.2 °F (35.7 °C)-98.6 °F (37 °C)]   Pulse:  [63-86]   Resp:  [12-22]   BP: (132-202)/(60-89)   SpO2:  [91 %-98 %] .   Home meds for hypertension were reviewed and noted below.   Hypertension Medications               furosemide (LASIX) 40 MG tablet Take 1 tablet (40 mg total) by mouth once daily.    metoprolol succinate (TOPROL-XL) 100 MG 24 hr tablet Take 1 tablet (100 mg total) by mouth once daily.            While in the hospital, will manage blood pressure as follows; Continue home antihypertensive regimen    Currently holding Lasix due to MARQUEZ    Will utilize p.r.n. blood pressure medication only if patient's blood pressure greater than 220/110 and she develops symptoms such as worsening chest pain or shortness of breath.

## 2024-07-30 NOTE — TRANSFER OF CARE
"Anesthesia Transfer of Care Note    Patient: Misa Barajas    Procedure(s) Performed: Procedure(s) (LRB):  ERCP (ENDOSCOPIC RETROGRADE CHOLANGIOPANCREATOGRAPHY) (N/A)    Patient location: PACU    Anesthesia Type: general    Transport from OR: Transported from OR on 6-10 L/min O2 by face mask with adequate spontaneous ventilation    Post pain: adequate analgesia    Post assessment: no apparent anesthetic complications and tolerated procedure well    Post vital signs: stable    Level of consciousness: awake    Nausea/Vomiting: no nausea/vomiting    Complications: none    Transfer of care protocol was followed    Last vitals: Visit Vitals  BP (!) 153/71 (BP Location: Right arm, Patient Position: Lying)   Pulse 76   Temp 36.6 °C (97.9 °F) (Temporal)   Resp 18   Ht 5' 4" (1.626 m)   Wt 77.1 kg (169 lb 15.6 oz)   SpO2 (!) 94%   Breastfeeding No   BMI 29.18 kg/m²     "

## 2024-07-30 NOTE — SUBJECTIVE & OBJECTIVE
Interval History: Patient underwent an ERCP with sphincterotomy and is being continued on Zosyn. Patient's lab did show signs of an MARQUEZ. 75 mL/hr LR was given for 10 hours. Will evaluate pain levels and renal function tomorrow and see how she is progressing.     Review of Systems   Constitutional:  Negative for chills, diaphoresis and fever.   Respiratory:  Positive for shortness of breath. Negative for cough.         Patient requires 2L oxygen at home   Cardiovascular:  Positive for leg swelling. Negative for chest pain and palpitations.   Gastrointestinal:  Positive for abdominal pain. Negative for constipation, diarrhea, nausea and vomiting.        Pain mostly in upper quadrants, radiates  to back. Some tenderness in lower quadrants   Genitourinary:  Negative for difficulty urinating and dysuria.   Musculoskeletal:  Negative for arthralgias and myalgias.   Skin:  Negative for color change.   Neurological:  Negative for dizziness and headaches.     Objective:     Vital Signs (Most Recent):  Temp: 97.7 °F (36.5 °C) (07/30/24 1625)  Pulse: 80 (07/30/24 1625)  Resp: 18 (07/30/24 1625)  BP: 138/73 (07/30/24 1625)  SpO2: (!) 91 % (07/30/24 1625) Vital Signs (24h Range):  Temp:  [96.2 °F (35.7 °C)-98.6 °F (37 °C)] 97.7 °F (36.5 °C)  Pulse:  [63-86] 80  Resp:  [12-22] 18  SpO2:  [91 %-98 %] 91 %  BP: (132-202)/(60-89) 138/73     Weight: 77.1 kg (169 lb 15.6 oz)  Body mass index is 29.18 kg/m².    Intake/Output Summary (Last 24 hours) at 7/30/2024 1732  Last data filed at 7/30/2024 1254  Gross per 24 hour   Intake 1120 ml   Output --   Net 1120 ml         Physical Exam  Constitutional:       General: She is not in acute distress.     Appearance: Normal appearance. She is not ill-appearing.   HENT:      Head: Normocephalic and atraumatic.   Eyes:      General: No scleral icterus.     Conjunctiva/sclera: Conjunctivae normal.   Cardiovascular:      Rate and Rhythm: Normal rate and regular rhythm.      Pulses: Normal pulses.       Heart sounds: Normal heart sounds.   Pulmonary:      Effort: Pulmonary effort is normal.      Breath sounds: Normal breath sounds.   Abdominal:      General: Abdomen is flat. There is no distension.      Palpations: Abdomen is soft.      Tenderness: There is abdominal tenderness. There is no rebound.      Comments: Most tender in epigastric and RUQ and epigastric regions upon palpation. Some tenderness in LRQ and LLQ   Musculoskeletal:         General: Swelling present. Normal range of motion.      Cervical back: Normal range of motion and neck supple.      Comments: Mild pitting edema in lower extremities bilaterally    Skin:     General: Skin is warm and dry.      Capillary Refill: Capillary refill takes less than 2 seconds.      Coloration: Skin is not jaundiced.   Neurological:      Mental Status: She is alert and oriented to person, place, and time.   Psychiatric:         Mood and Affect: Mood normal.         Behavior: Behavior normal.             Significant Labs: All pertinent labs within the past 24 hours have been reviewed.    Significant Imaging: I have reviewed all pertinent imaging results/findings within the past 24 hours.

## 2024-07-30 NOTE — PROGRESS NOTES
[facility] ([address] Medicine  Progress Note    Patient Name: Misa Barajas  MRN: [id number]  Patient Class: IP- Inpatient   Admission Date: 7/26/2024  Length of Stay: 2 days  Attending Physician: Brea Casanova DO  Primary Care Provider: Celia Carlisle MD        Subjective:     Principal Problem:Acute biliary pancreatitis        HPI:  Misa Barajas is a 78F with a PMHx of HTN, HLD, HFpEF, COPD (on 2L NC baseline O2 sat 92%) and pAF [place] [facility] [place] acute onset abdominal pain that radiated to her back. States she has RUQ and epigastric pain that started acutely this afternoon. She rates her pain as 10/10 intensity, sharp and non-pleuritic in nature. She reports a past history of nephrolithiasis but states that this pain is different. Pain is not post-prandial in nature and explains that this is differen than her past episodes of nephrolithiasis. SOB started at around thesame time and she normally is on 2L/min of at home oxygen due to her COPD. She states that she was watching TV when the pain suddenly started. She denies nausea, vomiting, fever, chills, or LUTS including hamturia or dysuria. Recent admission 7/12-7/18 for pasturella bactermia d/t cat bite, completed 2wk CTX (EOT 7/26, midline removed 7/26). No recent changes in appetite reported. She also denies diarrhea, constipation.     In ED, Slightly hypertensive 172/83, no tachycardia, on 2L NC saturating 98%, afebrile. CBC with slightly worsened anemia (Hb 8.9, baseline seems approx 10), no leukocytosis. CMP with hypokalemia K 3.4, hypercalcemia (corrected 11.5), Alb 2.6, LFT elevation including T. Bili 1.1, AST//54, . Lipase >3000. , troponin normal. CTA (obtained for aortic dissection r/o) showed cholelithiasis Distended BG with pericholecystic fluid and cholelithiasis. No intrahepatic or extrahepatic biliary ductal dilatation. CTA showed cholelithiasis so we obtained a right upper  quadrant ultrasound as well that confirmed the cholelithiasis and could not necessarily rule out cholecystitis. Gave CTX and 1L IVF.     Overview/Hospital Course:  CTA Abdomen/pelvis (7/26/24) revealed non-obstruction renal calculi, cholelithiasis, possible early cholecystitis, no signs of choledocholithiasis, and stranding in the pancreas suggestive of pancreatits. MRCP on 7/28/24 showed cholelithiasis and pancreatitis without choledocolithiasis or cholecystitis. General surgery consulted and recommends a HIDA to definitively rule out Cholecystitis. If HIDA is negative, general surgery recommends an ERCP with sphincterotomy instead of a cholecystectomy due to co-morbidities increasing surgical risk. Gastroenterology recommends the cholecystectomy but will do a sphincterotomy if this cannot be done.   HIDA scan was negative for cholecystitis. General surgery recommends an ERCP with sphincterotomy due to increased surgical risk.    Interval History: Patient's HIDA scan was negative. General surgical recommendation is ERCP with sphincterotomy. Patient is NPO at midnight in preporation for possible procedure.     Review of Systems   Constitutional:  Negative for chills, diaphoresis and fever.   Respiratory:  Positive for shortness of breath. Negative for cough.         Patient requires 2L oxygen at home   Cardiovascular:  Positive for leg swelling. Negative for chest pain and palpitations.   Gastrointestinal:  Positive for abdominal pain. Negative for constipation, diarrhea, nausea and vomiting.        Pain in upper quadrants, radiates  to back   Genitourinary:  Negative for difficulty urinating and dysuria.   Musculoskeletal:  Negative for arthralgias and myalgias.   Skin:  Negative for color change.   Neurological:  Negative for dizziness and headaches.     Objective:     Vital Signs (Most Recent):  Temp: 98.2 °F (36.8 °C) (07/29/24 1729)  Pulse: 76 (07/29/24 1933)  Resp: 18 (07/29/24 1933)  BP: 137/63 (07/29/24  1933)  SpO2: 96 % (07/29/24 1933) Vital Signs (24h Range):  Temp:  [98.1 °F (36.7 °C)-98.9 °F (37.2 °C)] 98.2 °F (36.8 °C)  Pulse:  [73-80] 76  Resp:  [17-19] 18  SpO2:  [92 %-96 %] 96 %  BP: (134-157)/(63-94) 137/63     Weight: 77.1 kg (169 lb 15.6 oz)  Body mass index is 29.18 kg/m².    Intake/Output Summary (Last 24 hours) at 7/29/2024 2043  Last data filed at 7/29/2024 1738  Gross per 24 hour   Intake 120 ml   Output 850 ml   Net -730 ml         Physical Exam  Constitutional:       General: She is not in acute distress.     Appearance: Normal appearance. She is not ill-appearing.   HENT:      Head: Normocephalic and atraumatic.   Eyes:      General: No scleral icterus.     Conjunctiva/sclera: Conjunctivae normal.   Cardiovascular:      Rate and Rhythm: Normal rate and regular rhythm.      Pulses: Normal pulses.      Heart sounds: Normal heart sounds.   Pulmonary:      Effort: Pulmonary effort is normal.      Breath sounds: Normal breath sounds.   Abdominal:      General: Abdomen is flat. There is no distension.      Palpations: Abdomen is soft.      Tenderness: There is abdominal tenderness. There is no rebound.      Comments: Most tender in epigastric and RUQ upon palpation   Musculoskeletal:         General: No swelling. Normal range of motion.      Cervical back: Normal range of motion and neck supple.   Skin:     General: Skin is warm and dry.      Capillary Refill: Capillary refill takes less than 2 seconds.      Coloration: Skin is not jaundiced.   Neurological:      Mental Status: She is alert and oriented to person, place, and time.   Psychiatric:         Mood and Affect: Mood normal.         Behavior: Behavior normal.             Significant Labs: All pertinent labs within the past 24 hours have been reviewed.    Significant Imaging: I have reviewed all pertinent imaging results/findings within the past 24 hours.    Assessment/Plan:      * Acute biliary pancreatitis  Sudden onset abdominal pain not a/w  eating c/f gallstone pancreatitis (given cholelithiasis seen on CTA and US). No fever, chills, or other s/s infection to suggest cholangitis. Does have elevated T bili and AST/ALT. Severe abdominal pain. No nausea or vomiting. No acute alcohol intake. Slight hypercalcemia of unknown etiology, however not profound enough to explain acute pancreatitis. Lipase >3000. CT AP with peripancreatic stranding and edema, greatest about the head and neck, favored to be related to pancreatitis. Location of pain plus CT findings and lab findings do not suggest nephrolithiasis as etiology.     - AES consulted given LFT elevation, appreciate recommendations  - MRCP to better delineate biliary obstruction  - No antibiotics as low concern for cholangitis, would start Zosyn if febrile or becoming hypotensive  - cautious with fluids given CHF (no signs of decompensation, got 1L in ED)  - analgesic therapy  -General surgery recommends HIDA to definitively r/o cholecystitis  -If HIDA negative, General Surgery recommends ERCP w/ sphincterotomy due to co-morbidities increasing surgical risk   -Gastroenterology states cholecystectomy is best first line treatment, but an ERCP with sphincterectomy is a good second line treatment if cholecystectomy cannot be done    -HIDA was negative for cholecystitis; NPO for possible ERCP with sphincterotomy tomorrow      COPD (chronic obstructive pulmonary disease)  Patient's COPD is controlled currently.  Patient is currently off COPD Pathway. Continue scheduled inhalers Supplemental oxygen and monitor respiratory status closely.     No wheezing, no s/s respiratory illness. On home 2L NC.     - No Brezztri on formulary, started on breo    Chronic diastolic heart failure  No signs of decompensation. Stable RLE edema. No orthopnea, no rales.    - Continue home lasix and metoprolol      Paroxysmal atrial fibrillation  Patient with Paroxysmal (<7 days) atrial fibrillation which is controlled currently with  Beta Blocker. Patient is currently in sinus rhythm.[id number] Score: 3. Anticoagulation not indicated due to possible procedure .    CKD (chronic kidney disease) stage 3, GFR 30-59 ml/min  Creatine stable for now. BMP reviewed- noted Estimated Creatinine Clearance: 37.6 mL/min (based on SCr of 1.2 mg/dL). according to latest data. Based on current GFR, CKD stage is stage 3 - GFR 30-59.  Monitor UOP and serial BMP and adjust therapy as needed. Renally dose meds. Avoid nephrotoxic medications and procedures.    Secondary hyperparathyroidism of renal origin  Causing slight hypercalcemia.     - Check calcitriol   - CTM CMP      Essential hypertension  Chronic, uncontrolled. Latest blood pressure and vitals reviewed-     Temp:  [98.1 °F (36.7 °C)-98.9 °F (37.2 °C)]   Pulse:  [73-80]   Resp:  [17-19]   BP: (134-157)/(63-94)   SpO2:  [92 %-96 %] .   Home meds for hypertension were reviewed and noted below.   Hypertension Medications               furosemide (LASIX) 40 MG tablet Take 1 tablet (40 mg total) by mouth once daily.    metoprolol succinate (TOPROL-XL) 100 MG 24 hr tablet Take 1 tablet (100 mg total) by mouth once daily.            While in the hospital, will manage blood pressure as follows; Continue home antihypertensive regimen    Will utilize p.r.n. blood pressure medication only if patient's blood pressure greater than 220/110 and she develops symptoms such as worsening chest pain or shortness of breath.      VTE Risk Mitigation (From admission, onward)           Ordered     Reason for No Pharmacological VTE Prophylaxis  Once        Comments: Anticipate possible procerdure   Question:  Reasons:  Answer:  Physician Provided (leave comment)    07/27/24 0430     IP VTE HIGH RISK PATIENT  Once         07/27/24 0430     Place sequential compression device  Until discontinued         07/27/24 0430                    Discharge Planning   NOHEMI: 7/31/2024     Code Status: Full Code   Is the patient medically ready for  discharge?:     Reason for patient still in hospital (select all that apply): Patient trending condition and Treatment  Discharge Plan A: Home with family, Home Health                  Karey Liang MD  Department of Hospital Medicine   [facility] S[facility] ([place])

## 2024-07-30 NOTE — ASSESSMENT & PLAN NOTE
Creatine stable for now. BMP reviewed- noted Estimated Creatinine Clearance: 30.1 mL/min (A) (based on SCr of 1.5 mg/dL (H)). according to latest data. Based on current GFR, CKD stage is stage 3 - GFR 30-59.  Monitor UOP and serial BMP and adjust therapy as needed. Renally dose meds. Avoid nephrotoxic medications and procedures.    Holding Lasix due to labs showing MARQUEZ.

## 2024-07-30 NOTE — ASSESSMENT & PLAN NOTE
No signs of decompensation. Stable RLE edema. No orthopnea, no rales.    - Continue home metoprolol    -Holding lasix due to MARQUEZ

## 2024-07-30 NOTE — PROVATION PATIENT INSTRUCTIONS
Discharge Summary/Instructions after an Endoscopic Procedure  Patient Name: Misa Barajas  Patient MRN: [id number]  Patient YOB: 1945  Tuesday, July 30, 2024  Sierra Cotto MD  Dear patient,  As a result of recent federal legislation (The Federal Cures Act), you may   receive lab or pathology results from your procedure in your [facility]   account before your physician is able to contact you. Your physician or   their representative will relay the results to you with their   recommendations at their soonest availability.  Thank you,  RESTRICTIONS:  During your procedure today, you received medications for sedation.  These   medications may affect your judgment, balance and coordination.  Therefore,   for 24 hours, you have the following restrictions:   - DO NOT drive a car, operate machinery, make legal/financial decisions,   sign important papers or drink alcohol.    ACTIVITY:  Today: no heavy lifting, straining or running due to procedural   sedation/anesthesia.  The following day: return to full activity including work.  DIET:  Eat and drink normally unless instructed otherwise.     TREATMENT FOR COMMON SIDE EFFECTS:  - Mild abdominal pain, nausea, belching, bloating or excessive gas:  rest,   eat lightly and use a heating pad.  - Sore Throat: treat with throat lozenges and/or gargle with warm salt   water.  - Because air was used during the procedure, expelling large amounts of air   from your rectum or belching is normal.  - If a bowel prep was taken, you may not have a bowel movement for 1-3 days.    This is normal.  SYMPTOMS TO WATCH FOR AND REPORT TO YOUR PHYSICIAN:  1. Abdominal pain or bloating, other than gas cramps.  2. Chest pain.  3. Back pain.  4. Signs of infection such as: chills or fever occurring within 24 hours   after the procedure.  5. Rectal bleeding, which would show as bright red, maroon, or black stools.   (A tablespoon of blood from the rectum is not serious, especially if    hemorrhoids are present.)  6. Vomiting.  7. Weakness or dizziness.  GO DIRECTLY TO THE NEAREST EMERGENCY ROOM IF YOU HAVE ANY OF THE FOLLOWING:      Difficulty breathing              Chills and/or fever over 101 F   Persistent vomiting and/or vomiting blood   Severe abdominal pain   Severe chest pain   Black, tarry stools   Bleeding- more than one tablespoon   Any other symptom or condition that you feel may need urgent attention  Your doctor recommends these additional instructions:  If any biopsies were taken, your doctors clinic will contact you in 1 to 2   weeks with any results.  - Continue antibiotics for next 3-5 days post ERCP.  - Return patient to [facility] for ongoing care.   - Resume previous diet.   - Watch for pancreatitis, bleeding, perforation, and cholangitis.   - Return to referring physician.   - The patient should not require a repeat ERCP unless future concern for   biliary obstruction develops.   - If future recurrent episodes of gallstone pancreatitis occur despite   today's ERCP with sphincterotomy, reconsider candidacy for cholecystectomy   though understand at this time patient is higher risk.  For questions, problems or results please call your physician - Sierra Cotto MD at Work:  [phone].  [facility], EMERGENCY ROOM PHONE NUMBER: [phone]  IF A COMPLICATION OR EMERGENCY SITUATION ARISES AND YOU ARE UNABLE TO REACH   YOUR PHYSICIAN - GO DIRECTLY TO THE EMERGENCY ROOM.  Sierra Cotto MD  7/30/2024 1:00:36 PM  This report has been verified and signed electronically.  Dear patient,  As a result of recent federal legislation (The Federal Cures Act), you may   receive lab or pathology results from your procedure in your [facility]   account before your physician is able to contact you. Your physician or   their representative will relay the results to you with their   recommendations at their soonest availability.  Thank you,  PROVATION

## 2024-07-30 NOTE — SUBJECTIVE & OBJECTIVE
Interval History: Patient's HIDA scan was negative. General surgical recommendation is ERCP with sphincterotomy. Patient is NPO at midnight in preporation for possible procedure.     Review of Systems   Constitutional:  Negative for chills, diaphoresis and fever.   Respiratory:  Positive for shortness of breath. Negative for cough.         Patient requires 2L oxygen at home   Cardiovascular:  Positive for leg swelling. Negative for chest pain and palpitations.   Gastrointestinal:  Positive for abdominal pain. Negative for constipation, diarrhea, nausea and vomiting.        Pain in upper quadrants, radiates  to back   Genitourinary:  Negative for difficulty urinating and dysuria.   Musculoskeletal:  Negative for arthralgias and myalgias.   Skin:  Negative for color change.   Neurological:  Negative for dizziness and headaches.     Objective:     Vital Signs (Most Recent):  Temp: 98.2 °F (36.8 °C) (07/29/24 1729)  Pulse: 76 (07/29/24 1933)  Resp: 18 (07/29/24 1933)  BP: 137/63 (07/29/24 1933)  SpO2: 96 % (07/29/24 1933) Vital Signs (24h Range):  Temp:  [98.1 °F (36.7 °C)-98.9 °F (37.2 °C)] 98.2 °F (36.8 °C)  Pulse:  [73-80] 76  Resp:  [17-19] 18  SpO2:  [92 %-96 %] 96 %  BP: (134-157)/(63-94) 137/63     Weight: 77.1 kg (169 lb 15.6 oz)  Body mass index is 29.18 kg/m².    Intake/Output Summary (Last 24 hours) at 7/29/2024 2043  Last data filed at 7/29/2024 1738  Gross per 24 hour   Intake 120 ml   Output 850 ml   Net -730 ml         Physical Exam  Constitutional:       General: She is not in acute distress.     Appearance: Normal appearance. She is not ill-appearing.   HENT:      Head: Normocephalic and atraumatic.   Eyes:      General: No scleral icterus.     Conjunctiva/sclera: Conjunctivae normal.   Cardiovascular:      Rate and Rhythm: Normal rate and regular rhythm.      Pulses: Normal pulses.      Heart sounds: Normal heart sounds.   Pulmonary:      Effort: Pulmonary effort is normal.      Breath sounds: Normal  breath sounds.   Abdominal:      General: Abdomen is flat. There is no distension.      Palpations: Abdomen is soft.      Tenderness: There is abdominal tenderness. There is no rebound.      Comments: Most tender in epigastric and RUQ upon palpation   Musculoskeletal:         General: No swelling. Normal range of motion.      Cervical back: Normal range of motion and neck supple.   Skin:     General: Skin is warm and dry.      Capillary Refill: Capillary refill takes less than 2 seconds.      Coloration: Skin is not jaundiced.   Neurological:      Mental Status: She is alert and oriented to person, place, and time.   Psychiatric:         Mood and Affect: Mood normal.         Behavior: Behavior normal.             Significant Labs: All pertinent labs within the past 24 hours have been reviewed.    Significant Imaging: I have reviewed all pertinent imaging results/findings within the past 24 hours.

## 2024-07-30 NOTE — NURSING TRANSFER
Nursing Transfer Note      Nurse giving handoff:Belkys MILES    Nurse receiving handoff:Kathleen MILES    Reason patient is being transferred: Post procedure    Transfer To: Room 86251    Transfer via stretcher    Transported by PCT Damaris    Transfer Vital Signs:see flowsheet    Order for Tele Monitor? No    Additional Lines: Oxygen    Medicines sent: None    Any special needs or follow-up needed: Routine    Patient belongings transferred with patient:  None    Chart send with patient: Yes    Notified: daughter    Patient reassessed at: 7/30/2024  1400 (date, time)

## 2024-07-30 NOTE — ASSESSMENT & PLAN NOTE
Sudden onset abdominal pain not a/w eating c/f gallstone pancreatitis (given cholelithiasis seen on CTA and US). No fever, chills, or other s/s infection to suggest cholangitis. Does have elevated T bili and AST/ALT. Severe abdominal pain. No nausea or vomiting. No acute alcohol intake. Slight hypercalcemia of unknown etiology, however not profound enough to explain acute pancreatitis. Lipase >3000. CT AP with peripancreatic stranding and edema, greatest about the head and neck, favored to be related to pancreatitis. Location of pain plus CT findings and lab findings do not suggest nephrolithiasis as etiology.     - AES consulted given LFT elevation, appreciate recommendations  - MRCP to better delineate biliary obstruction  - No antibiotics as low concern for cholangitis, would start Zosyn if febrile or becoming hypotensive  - cautious with fluids given CHF (no signs of decompensation, got 1L in ED)  - analgesic therapy  -General surgery recommends HIDA to definitively r/o cholecystitis  -If HIDA negative, General Surgery recommends ERCP w/ sphincterotomy due to co-morbidities increasing surgical risk   -Gastroenterology states cholecystectomy is best first line treatment, but an ERCP with sphincterectomy is a good second line treatment if cholecystectomy cannot be done    -HIDA was negative for cholecystitis;     ERCP with sphincterotomy performed by AES

## 2024-07-31 LAB
1,25(OH)2D3 SERPL-MCNC: 28 PG/ML (ref 20–79)
ALBUMIN SERPL BCP-MCNC: 1.8 G/DL (ref 3.5–5.2)
ALP SERPL-CCNC: 120 U/L (ref 55–135)
ALT SERPL W/O P-5'-P-CCNC: 28 U/L (ref 10–44)
ANION GAP SERPL CALC-SCNC: 10 MMOL/L (ref 8–16)
AST SERPL-CCNC: 34 U/L (ref 10–40)
BASOPHILS # BLD AUTO: 0.08 K/UL (ref 0–0.2)
BASOPHILS NFR BLD: 0.5 % (ref 0–1.9)
BILIRUB SERPL-MCNC: 1.6 MG/DL (ref 0.1–1)
BUN SERPL-MCNC: 25 MG/DL (ref 8–23)
CALCIUM SERPL-MCNC: 9.4 MG/DL (ref 8.7–10.5)
CHLORIDE SERPL-SCNC: 106 MMOL/L (ref 95–110)
CO2 SERPL-SCNC: 25 MMOL/L (ref 23–29)
CREAT SERPL-MCNC: 1.3 MG/DL (ref 0.5–1.4)
DIFFERENTIAL METHOD BLD: ABNORMAL
EOSINOPHIL # BLD AUTO: 0.2 K/UL (ref 0–0.5)
EOSINOPHIL NFR BLD: 1.3 % (ref 0–8)
ERYTHROCYTE [DISTWIDTH] IN BLOOD BY AUTOMATED COUNT: 14.6 % (ref 11.5–14.5)
EST. GFR  (NO RACE VARIABLE): 41.6 ML/MIN/1.73 M^2
GLUCOSE SERPL-MCNC: 89 MG/DL (ref 70–110)
HCT VFR BLD AUTO: 35.8 % (ref 37–48.5)
HGB BLD-MCNC: 11.3 G/DL (ref 12–16)
IMM GRANULOCYTES # BLD AUTO: 0.11 K/UL (ref 0–0.04)
IMM GRANULOCYTES NFR BLD AUTO: 0.7 % (ref 0–0.5)
LYMPHOCYTES # BLD AUTO: 1.4 K/UL (ref 1–4.8)
LYMPHOCYTES NFR BLD: 8.3 % (ref 18–48)
MAGNESIUM SERPL-MCNC: 1.8 MG/DL (ref 1.6–2.6)
MCH RBC QN AUTO: 29.2 PG (ref 27–31)
MCHC RBC AUTO-ENTMCNC: 31.6 G/DL (ref 32–36)
MCV RBC AUTO: 93 FL (ref 82–98)
MONOCYTES # BLD AUTO: 1.9 K/UL (ref 0.3–1)
MONOCYTES NFR BLD: 11.2 % (ref 4–15)
NEUTROPHILS # BLD AUTO: 12.9 K/UL (ref 1.8–7.7)
NEUTROPHILS NFR BLD: 78 % (ref 38–73)
NRBC BLD-RTO: 0 /100 WBC
PLATELET # BLD AUTO: 274 K/UL (ref 150–450)
PMV BLD AUTO: 9.6 FL (ref 9.2–12.9)
POTASSIUM SERPL-SCNC: 3.1 MMOL/L (ref 3.5–5.1)
PROT SERPL-MCNC: 5.4 G/DL (ref 6–8.4)
RBC # BLD AUTO: 3.87 M/UL (ref 4–5.4)
SODIUM SERPL-SCNC: 141 MMOL/L (ref 136–145)
WBC # BLD AUTO: 16.58 K/UL (ref 3.9–12.7)

## 2024-07-31 PROCEDURE — 21400001 HC TELEMETRY ROOM

## 2024-07-31 PROCEDURE — 99900035 HC TECH TIME PER 15 MIN (STAT)

## 2024-07-31 PROCEDURE — 94640 AIRWAY INHALATION TREATMENT: CPT

## 2024-07-31 PROCEDURE — 97165 OT EVAL LOW COMPLEX 30 MIN: CPT

## 2024-07-31 PROCEDURE — 27000221 HC OXYGEN, UP TO 24 HOURS

## 2024-07-31 PROCEDURE — 25000003 PHARM REV CODE 250

## 2024-07-31 PROCEDURE — 80053 COMPREHEN METABOLIC PANEL: CPT

## 2024-07-31 PROCEDURE — 83735 ASSAY OF MAGNESIUM: CPT

## 2024-07-31 PROCEDURE — 94761 N-INVAS EAR/PLS OXIMETRY MLT: CPT

## 2024-07-31 PROCEDURE — 85025 COMPLETE CBC W/AUTO DIFF WBC: CPT

## 2024-07-31 PROCEDURE — 97535 SELF CARE MNGMENT TRAINING: CPT

## 2024-07-31 PROCEDURE — 63600175 PHARM REV CODE 636 W HCPCS

## 2024-07-31 PROCEDURE — 36415 COLL VENOUS BLD VENIPUNCTURE: CPT

## 2024-07-31 RX ADMIN — PIPERACILLIN SODIUM AND TAZOBACTAM SODIUM 4.5 G: 4; .5 INJECTION, POWDER, FOR SOLUTION INTRAVENOUS at 09:07

## 2024-07-31 RX ADMIN — POTASSIUM BICARBONATE 40 MEQ: 391 TABLET, EFFERVESCENT ORAL at 11:07

## 2024-07-31 RX ADMIN — PIPERACILLIN SODIUM AND TAZOBACTAM SODIUM 4.5 G: 4; .5 INJECTION, POWDER, FOR SOLUTION INTRAVENOUS at 11:07

## 2024-07-31 RX ADMIN — POTASSIUM BICARBONATE 40 MEQ: 391 TABLET, EFFERVESCENT ORAL at 08:07

## 2024-07-31 RX ADMIN — POLYETHYLENE GLYCOL 3350 17 G: 17 POWDER, FOR SOLUTION ORAL at 08:07

## 2024-07-31 RX ADMIN — SENNOSIDES 8.6 MG: 8.6 TABLET, FILM COATED ORAL at 08:07

## 2024-07-31 RX ADMIN — METOPROLOL SUCCINATE 100 MG: 50 TABLET, EXTENDED RELEASE ORAL at 08:07

## 2024-07-31 RX ADMIN — OXYCODONE HYDROCHLORIDE 5 MG: 5 TABLET ORAL at 12:07

## 2024-07-31 RX ADMIN — PIPERACILLIN SODIUM AND TAZOBACTAM SODIUM 4.5 G: 4; .5 INJECTION, POWDER, FOR SOLUTION INTRAVENOUS at 04:07

## 2024-07-31 RX ADMIN — FLUTICASONE FUROATE AND VILANTEROL TRIFENATATE 1 PUFF: 100; 25 POWDER RESPIRATORY (INHALATION) at 08:07

## 2024-07-31 NOTE — PLAN OF CARE
Problem: Adult Inpatient Plan of Care  Goal: Plan of Care Review  Outcome: Progressing  Goal: Patient-Specific Goal (Individualized)  Outcome: Progressing  Goal: Absence of Hospital-Acquired Illness or Injury  Outcome: Progressing  Goal: Optimal Comfort and Wellbeing  Outcome: Progressing  Goal: Readiness for Transition of Care  Outcome: Progressing     Problem: Acute Kidney Injury/Impairment  Goal: Fluid and Electrolyte Balance  Outcome: Progressing  Goal: Improved Oral Intake  Outcome: Progressing  Goal: Effective Renal Function  Outcome: Progressing     Problem: Skin Injury Risk Increased  Goal: Skin Health and Integrity  Outcome: Progressing     Problem: Pain Acute  Goal: Optimal Pain Control and Function  Outcome: Progressing     Problem: Infection  Goal: Absence of Infection Signs and Symptoms  Outcome: Progressing   Pt AAO X 4; able to express needs.  Pain managed with PO Oxy.  Iv ABX given as ordered.  IV fluids completed.  Possible discharge home today.    Safety maintained.  Bed in low position,  call  light in reach.

## 2024-07-31 NOTE — PROGRESS NOTES
[facility] ([address] Medicine  Progress Note    Patient Name: Misa Barajas  MRN: [id number]  Patient Class: IP- Inpatient   Admission Date: 7/26/2024  Length of Stay: 4 days  Attending Physician: Brea Casanova DO  Primary Care Provider: Celia Carlisle MD        Subjective:     Principal Problem:Acute biliary pancreatitis        HPI:  Misa Barajas is a 78F with a PMHx of HTN, HLD, HFpEF, COPD (on 2L NC baseline O2 sat 92%) and pAF who presented to [facility] 7/26 for acute onset abdominal pain that radiated to her back. States she has RUQ and epigastric pain that started acutely this afternoon. She rates her pain as 10/10 intensity, sharp and non-pleuritic in nature. She reports a past history of nephrolithiasis but states that this pain is different. Pain is not post-prandial in nature and explains that this is differen than her past episodes of nephrolithiasis. SOB started at around thesame time and she normally is on 2L/min of at home oxygen due to her COPD. She states that she was watching TV when the pain suddenly started. She denies nausea, vomiting, fever, chills, or LUTS including hamturia or dysuria. Recent admission 7/12-7/18 for pasturella bactermia d/t cat bite, completed 2wk CTX (EOT 7/26, midline removed 7/26). No recent changes in appetite reported. She also denies diarrhea, constipation.     In ED, Slightly hypertensive 172/83, no tachycardia, on 2L NC saturating 98%, afebrile. CBC with slightly worsened anemia (Hb 8.9, baseline seems approx 10), no leukocytosis. CMP with hypokalemia K 3.4, hypercalcemia (corrected 11.5), Alb 2.6, LFT elevation including T. Bili 1.1, AST//54, . Lipase >3000. , troponin normal. CTA (obtained for aortic dissection r/o) showed cholelithiasis Distended BG with pericholecystic fluid and cholelithiasis. No intrahepatic or extrahepatic biliary ductal dilatation. CTA showed cholelithiasis so we obtained a right upper  quadrant ultrasound as well that confirmed the cholelithiasis and could not necessarily rule out cholecystitis. Gave CTX and 1L IVF.     Overview/Hospital Course:  CTA Abdomen/pelvis (7/26/24) revealed non-obstruction renal calculi, cholelithiasis, possible early cholecystitis, no signs of choledocholithiasis, and stranding in the pancreas suggestive of pancreatits. MRCP on 7/28/24 showed cholelithiasis and pancreatitis without choledocolithiasis or cholecystitis. General surgery consulted and recommends a HIDA to definitively rule out Cholecystitis. If HIDA is negative, general surgery recommends an ERCP with sphincterotomy instead of a cholecystectomy due to co-morbidities increasing surgical risk. HIDA scan was negative for cholecystitis. Patient underwent an ERCP with sphincterotomy 7/30/24. As of the next day patient was doing well and stated her pain had noticeably reduced. Patient and family agreed that the patient going to a SNF would be best after discharge. Planned discharge date (8/1/24)    Interval History: Patient is doing well post ERCP with sphincterotomy and stated her pain had noticeably reduced. Patient and family agreed that the patient going to a SNF would be best after discharge. Planned discharge date (8/1/24). Patient tolerated liquid diet well and was advanced to solids. Patient states she did not eat much of it but experienced no pain n/v with what she did eat. Patient has not yet had a BM after the procedure but reports passing gas.     Review of Systems   Constitutional:  Negative for chills, diaphoresis and fever.   Respiratory:  Positive for shortness of breath. Negative for cough.         Patient requires 2L oxygen at home   Cardiovascular:  Positive for leg swelling. Negative for chest pain and palpitations.   Gastrointestinal:  Positive for abdominal pain. Negative for constipation, diarrhea, nausea and vomiting.        Pain is much better today and only in the RUQ.    Genitourinary:   Negative for difficulty urinating and dysuria.   Musculoskeletal:  Negative for arthralgias and myalgias.   Skin:  Negative for color change.   Neurological:  Negative for dizziness and headaches.     Objective:     Vital Signs (Most Recent):  Temp: 98.2 °F (36.8 °C) (07/31/24 1558)  Pulse: 81 (07/31/24 1558)  Resp: 18 (07/31/24 1223)  BP: (!) 151/68 (07/31/24 1558)  SpO2: (!) 92 % (07/31/24 1558) Vital Signs (24h Range):  Temp:  [97.5 °F (36.4 °C)-98.2 °F (36.8 °C)] 98.2 °F (36.8 °C)  Pulse:  [79-98] 81  Resp:  [16-18] 18  SpO2:  [92 %-100 %] 92 %  BP: (124-168)/(68-78) 151/68     Weight: 77.1 kg (169 lb 15.6 oz)  Body mass index is 29.18 kg/m².    Intake/Output Summary (Last 24 hours) at 7/31/2024 1824  Last data filed at 7/31/2024 1804  Gross per 24 hour   Intake 720 ml   Output 1350 ml   Net -630 ml         Physical Exam  Constitutional:       General: She is not in acute distress.     Appearance: Normal appearance. She is not ill-appearing.   HENT:      Head: Normocephalic and atraumatic.   Eyes:      General: No scleral icterus.     Conjunctiva/sclera: Conjunctivae normal.   Cardiovascular:      Rate and Rhythm: Normal rate and regular rhythm.      Pulses: Normal pulses.      Heart sounds: Normal heart sounds.   Pulmonary:      Effort: Pulmonary effort is normal.      Breath sounds: Normal breath sounds.   Abdominal:      General: Abdomen is flat. Bowel sounds are normal. There is no distension.      Palpations: Abdomen is soft.      Tenderness: There is abdominal tenderness. There is no rebound.      Comments: Mild tenderness in RUQ. Greatly improved since admission   Musculoskeletal:         General: Normal range of motion.      Cervical back: Normal range of motion and neck supple.      Right lower leg: Edema present.      Left lower leg: Edema present.      Comments: Mild pitting edema in lower extremities bilaterally    Skin:     General: Skin is warm and dry.      Capillary Refill: Capillary refill takes  less than 2 seconds.      Coloration: Skin is not jaundiced.   Neurological:      Mental Status: She is alert and oriented to person, place, and time.   Psychiatric:         Mood and Affect: Mood normal.         Behavior: Behavior normal.             Significant Labs: All pertinent labs within the past 24 hours have been reviewed.    Significant Imaging: I have reviewed all pertinent imaging results/findings within the past 24 hours.    Assessment/Plan:      * Acute biliary pancreatitis  Sudden onset abdominal pain not a/w eating c/f gallstone pancreatitis (given cholelithiasis seen on CTA and US). No fever, chills, or other s/s infection to suggest cholangitis. Does have elevated T bili and AST/ALT. Severe abdominal pain. No nausea or vomiting. No acute alcohol intake. Slight hypercalcemia of unknown etiology, however not profound enough to explain acute pancreatitis. Lipase >3000. CT AP with peripancreatic stranding and edema, greatest about the head and neck, favored to be related to pancreatitis. Location of pain plus CT findings and lab findings do not suggest nephrolithiasis as etiology.     - AES consulted given LFT elevation, appreciate recommendations  - MRCP to better delineate biliary obstruction  - No antibiotics as low concern for cholangitis, would start Zosyn if febrile or becoming hypotensive  - cautious with fluids given CHF (no signs of decompensation, got 1L in ED)  - analgesic therapy  -General surgery recommends HIDA to definitively r/o cholecystitis  -If HIDA negative, General Surgery recommends ERCP w/ sphincterotomy due to co-morbidities increasing surgical risk   -Gastroenterology states cholecystectomy is best first line treatment, but an ERCP with sphincterectomy is a good second line treatment if cholecystectomy cannot be done    -HIDA was negative for cholecystitis;     ERCP with sphincterotomy performed by AES      COPD (chronic obstructive pulmonary disease)  Patient's COPD is  controlled currently.  Patient is currently off COPD Pathway. Continue scheduled inhalers Supplemental oxygen and monitor respiratory status closely.     No wheezing, no s/s respiratory illness. On home 2L NC.     - No Brezztri on formulary, started on breo    Chronic diastolic heart failure  No signs of decompensation. Stable RLE edema. No orthopnea, no rales.    - Continue home metoprolol    -Holding lasix due to MARQUEZ      Paroxysmal atrial fibrillation  Patient with Paroxysmal (<7 days) atrial fibrillation which is controlled currently with Beta Blocker. Patient is currently in sinus rhythm.[id number] Score: 3. Anticoagulation not indicated due to possible procedure .    CKD (chronic kidney disease) stage 3, GFR 30-59 ml/min  Creatine stable for now. BMP reviewed- noted Estimated Creatinine Clearance: 34.7 mL/min (based on SCr of 1.3 mg/dL). according to latest data. Based on current GFR, CKD stage is stage 3 - GFR 30-59.  Monitor UOP and serial BMP and adjust therapy as needed. Renally dose meds. Avoid nephrotoxic medications and procedures.    Holding Lasix due to labs showing MARQUEZ.    Kidney function somewhat improved (7/31/24)     Secondary hyperparathyroidism of renal origin  Causing slight hypercalcemia.     - Check calcitriol   - CTM CMP      Essential hypertension  Chronic, uncontrolled. Latest blood pressure and vitals reviewed-     Temp:  [96.2 °F (35.7 °C)-98.6 °F (37 °C)]   Pulse:  [63-86]   Resp:  [12-22]   BP: (132-202)/(60-89)   SpO2:  [91 %-98 %] .   Home meds for hypertension were reviewed and noted below.   Hypertension Medications               furosemide (LASIX) 40 MG tablet Take 1 tablet (40 mg total) by mouth once daily.    metoprolol succinate (TOPROL-XL) 100 MG 24 hr tablet Take 1 tablet (100 mg total) by mouth once daily.            While in the hospital, will manage blood pressure as follows; Continue home antihypertensive regimen    Currently holding Lasix due to MARQUEZ    Will utilize  p.r.n. blood pressure medication only if patient's blood pressure greater than 220/110 and she develops symptoms such as worsening chest pain or shortness of breath.      VTE Risk Mitigation (From admission, onward)           Ordered     apixaban tablet 2.5 mg  2 times daily         07/31/24 1629     Reason for No Pharmacological VTE Prophylaxis  Once        Comments: Anticipate possible procerdure   Question:  Reasons:  Answer:  Physician Provided (leave comment)    07/27/24 0430     IP VTE HIGH RISK PATIENT  Once         07/27/24 0430     Place sequential compression device  Until discontinued         07/27/24 0430                    Discharge Planning   NOHEMI: 8/2/2024     Code Status: Full Code   Is the patient medically ready for discharge?:     Reason for patient still in hospital (select all that apply): Patient trending condition and Treatment  Discharge Plan A: Home with family, Home Health                  Karey Liang MD  Department of Hospital Medicine   [facility] S[facility] ([place]-[facility])

## 2024-07-31 NOTE — TREATMENT PLAN
AES Treatment Plan     Seen and examined this morning. S/p ERCP yesterday. Liver enzymes improving, tolerating po, mild abdominal pain improved since yesterday.     AES will sign off.     Thank you for involving us in the care of Misa Barajas. Please call with any additional questions, concerns or changes in the patient's clinical status.       Miracle Pepper MD  Gastroenterology Fellow, PGY IV

## 2024-07-31 NOTE — PLAN OF CARE
[facility] ([facility])  Discharge Reassessment    Primary Care Provider: Celia Carlisle MD    Expected Discharge Date: 7/31/2024    Reassessment (most recent)       Discharge Reassessment - 07/31/24 1526          Discharge Reassessment    Assessment Type Discharge Planning Reassessment (P)      Did the patient's condition or plan change since previous assessment? Yes (P)      Discharge Plan discussed with: Adult children (P)    Daughters Marina and Esther    Communicated NOHEMI with patient/caregiver Yes (P)      Discharge Plan A Home with family;Home Health (P)      Discharge Plan B Skilled Nursing Facility (P)      DME Needed Upon Discharge  none (P)      Transition of Care Barriers None (P)      Why the patient remains in the hospital Requires continued medical care (P)         Post-Acute Status    Post-Acute Authorization Placement (P)      Post-Acute Placement Status Patient List Provided (P)      Home Health Status Pending medical clearance/testing (P)      Coverage [employer] MED[employer]ARE ADVANTAGE - [facility] [employer] ADV - (P)                  Discharge Plan A and Plan B have been determined by review of patient's clinical status, future medical and therapeutic needs, and coverage/benefits for post-acute care in coordination with multidisciplinary team members.                             SULEIMAN Phillips, LMSW  [facility]  Case Management  Ext. [phone]

## 2024-07-31 NOTE — ASSESSMENT & PLAN NOTE
Creatine stable for now. BMP reviewed- noted Estimated Creatinine Clearance: 34.7 mL/min (based on SCr of 1.3 mg/dL). according to latest data. Based on current GFR, CKD stage is stage 3 - GFR 30-59.  Monitor UOP and serial BMP and adjust therapy as needed. Renally dose meds. Avoid nephrotoxic medications and procedures.    Holding Lasix due to labs showing MARQUEZ.    Kidney function somewhat improved (7/31/24)

## 2024-07-31 NOTE — SUBJECTIVE & OBJECTIVE
Interval History: Patient is doing well post ERCP with sphincterotomy and stated her pain had noticeably reduced. Patient and family agreed that the patient going to a SNF would be best after discharge. Planned discharge date (8/1/24). Patient tolerated liquid diet well and was advanced to solids. Patient states she did not eat much of it but experienced no pain n/v with what she did eat. Patient has not yet had a BM after the procedure but reports passing gas.     Review of Systems   Constitutional:  Negative for chills, diaphoresis and fever.   Respiratory:  Positive for shortness of breath. Negative for cough.         Patient requires 2L oxygen at home   Cardiovascular:  Positive for leg swelling. Negative for chest pain and palpitations.   Gastrointestinal:  Positive for abdominal pain. Negative for constipation, diarrhea, nausea and vomiting.        Pain is much better today and only in the RUQ.    Genitourinary:  Negative for difficulty urinating and dysuria.   Musculoskeletal:  Negative for arthralgias and myalgias.   Skin:  Negative for color change.   Neurological:  Negative for dizziness and headaches.     Objective:     Vital Signs (Most Recent):  Temp: 98.2 °F (36.8 °C) (07/31/24 1558)  Pulse: 81 (07/31/24 1558)  Resp: 18 (07/31/24 1223)  BP: (!) 151/68 (07/31/24 1558)  SpO2: (!) 92 % (07/31/24 1558) Vital Signs (24h Range):  Temp:  [97.5 °F (36.4 °C)-98.2 °F (36.8 °C)] 98.2 °F (36.8 °C)  Pulse:  [79-98] 81  Resp:  [16-18] 18  SpO2:  [92 %-100 %] 92 %  BP: (124-168)/(68-78) 151/68     Weight: 77.1 kg (169 lb 15.6 oz)  Body mass index is 29.18 kg/m².    Intake/Output Summary (Last 24 hours) at 7/31/2024 1824  Last data filed at 7/31/2024 1804  Gross per 24 hour   Intake 720 ml   Output 1350 ml   Net -630 ml         Physical Exam  Constitutional:       General: She is not in acute distress.     Appearance: Normal appearance. She is not ill-appearing.   HENT:      Head: Normocephalic and atraumatic.   Eyes:       General: No scleral icterus.     Conjunctiva/sclera: Conjunctivae normal.   Cardiovascular:      Rate and Rhythm: Normal rate and regular rhythm.      Pulses: Normal pulses.      Heart sounds: Normal heart sounds.   Pulmonary:      Effort: Pulmonary effort is normal.      Breath sounds: Normal breath sounds.   Abdominal:      General: Abdomen is flat. Bowel sounds are normal. There is no distension.      Palpations: Abdomen is soft.      Tenderness: There is abdominal tenderness. There is no rebound.      Comments: Mild tenderness in RUQ. Greatly improved since admission   Musculoskeletal:         General: Normal range of motion.      Cervical back: Normal range of motion and neck supple.      Right lower leg: Edema present.      Left lower leg: Edema present.      Comments: Mild pitting edema in lower extremities bilaterally    Skin:     General: Skin is warm and dry.      Capillary Refill: Capillary refill takes less than 2 seconds.      Coloration: Skin is not jaundiced.   Neurological:      Mental Status: She is alert and oriented to person, place, and time.   Psychiatric:         Mood and Affect: Mood normal.         Behavior: Behavior normal.             Significant Labs: All pertinent labs within the past 24 hours have been reviewed.    Significant Imaging: I have reviewed all pertinent imaging results/findings within the past 24 hours.

## 2024-07-31 NOTE — PLAN OF CARE
Problem: Occupational Therapy  Goal: Occupational Therapy Goal  Description: Goals to be met by: 8/28     Patient will increase functional independence with ADLs by performing:    UE Dressing with Stand-by Assistance.  LE Dressing with Stand-by Assistance.  Grooming while standing at sink with Contact Guard Assistance.  Sit>stand with RW from EOB with SBA  Outcome: Progressing

## 2024-08-01 LAB
ALBUMIN SERPL BCP-MCNC: 1.8 G/DL (ref 3.5–5.2)
ALP SERPL-CCNC: 174 U/L (ref 55–135)
ALT SERPL W/O P-5'-P-CCNC: 46 U/L (ref 10–44)
ANION GAP SERPL CALC-SCNC: 6 MMOL/L (ref 8–16)
ANISOCYTOSIS BLD QL SMEAR: SLIGHT
AST SERPL-CCNC: 87 U/L (ref 10–40)
BASOPHILS # BLD AUTO: 0.14 K/UL (ref 0–0.2)
BASOPHILS NFR BLD: 0.8 % (ref 0–1.9)
BILIRUB DIRECT SERPL-MCNC: 1.9 MG/DL (ref 0.1–0.3)
BILIRUB SERPL-MCNC: 2.5 MG/DL (ref 0.1–1)
BUN SERPL-MCNC: 26 MG/DL (ref 8–23)
BURR CELLS BLD QL SMEAR: ABNORMAL
CALCIUM SERPL-MCNC: 9.9 MG/DL (ref 8.7–10.5)
CHLORIDE SERPL-SCNC: 104 MMOL/L (ref 95–110)
CO2 SERPL-SCNC: 29 MMOL/L (ref 23–29)
CREAT SERPL-MCNC: 1.3 MG/DL (ref 0.5–1.4)
DIFFERENTIAL METHOD BLD: ABNORMAL
EOSINOPHIL # BLD AUTO: 0.4 K/UL (ref 0–0.5)
EOSINOPHIL NFR BLD: 2 % (ref 0–8)
ERYTHROCYTE [DISTWIDTH] IN BLOOD BY AUTOMATED COUNT: 14.5 % (ref 11.5–14.5)
EST. GFR  (NO RACE VARIABLE): 41.6 ML/MIN/1.73 M^2
GLUCOSE SERPL-MCNC: 101 MG/DL (ref 70–110)
HCT VFR BLD AUTO: 35.3 % (ref 37–48.5)
HGB BLD-MCNC: 11 G/DL (ref 12–16)
IMM GRANULOCYTES # BLD AUTO: 0.23 K/UL (ref 0–0.04)
IMM GRANULOCYTES NFR BLD AUTO: 1.3 % (ref 0–0.5)
LYMPHOCYTES # BLD AUTO: 1.9 K/UL (ref 1–4.8)
LYMPHOCYTES NFR BLD: 11.1 % (ref 18–48)
MAGNESIUM SERPL-MCNC: 1.9 MG/DL (ref 1.6–2.6)
MCH RBC QN AUTO: 28.7 PG (ref 27–31)
MCHC RBC AUTO-ENTMCNC: 31.2 G/DL (ref 32–36)
MCV RBC AUTO: 92 FL (ref 82–98)
MONOCYTES # BLD AUTO: 2.1 K/UL (ref 0.3–1)
MONOCYTES NFR BLD: 12.4 % (ref 4–15)
NEUTROPHILS # BLD AUTO: 12.4 K/UL (ref 1.8–7.7)
NEUTROPHILS NFR BLD: 72.4 % (ref 38–73)
NRBC BLD-RTO: 0 /100 WBC
OVALOCYTES BLD QL SMEAR: ABNORMAL
PLATELET # BLD AUTO: 269 K/UL (ref 150–450)
PMV BLD AUTO: 9.9 FL (ref 9.2–12.9)
POIKILOCYTOSIS BLD QL SMEAR: SLIGHT
POLYCHROMASIA BLD QL SMEAR: ABNORMAL
POTASSIUM SERPL-SCNC: 3.7 MMOL/L (ref 3.5–5.1)
PROT SERPL-MCNC: 5.6 G/DL (ref 6–8.4)
RBC # BLD AUTO: 3.83 M/UL (ref 4–5.4)
SODIUM SERPL-SCNC: 139 MMOL/L (ref 136–145)
WBC # BLD AUTO: 17.2 K/UL (ref 3.9–12.7)

## 2024-08-01 PROCEDURE — 80053 COMPREHEN METABOLIC PANEL: CPT

## 2024-08-01 PROCEDURE — 82248 BILIRUBIN DIRECT: CPT

## 2024-08-01 PROCEDURE — 85025 COMPLETE CBC W/AUTO DIFF WBC: CPT

## 2024-08-01 PROCEDURE — 83735 ASSAY OF MAGNESIUM: CPT

## 2024-08-01 PROCEDURE — 25000003 PHARM REV CODE 250

## 2024-08-01 PROCEDURE — 99900035 HC TECH TIME PER 15 MIN (STAT)

## 2024-08-01 PROCEDURE — 97535 SELF CARE MNGMENT TRAINING: CPT

## 2024-08-01 PROCEDURE — 63600175 PHARM REV CODE 636 W HCPCS

## 2024-08-01 PROCEDURE — 94761 N-INVAS EAR/PLS OXIMETRY MLT: CPT

## 2024-08-01 PROCEDURE — 97530 THERAPEUTIC ACTIVITIES: CPT

## 2024-08-01 PROCEDURE — 94640 AIRWAY INHALATION TREATMENT: CPT

## 2024-08-01 PROCEDURE — 21400001 HC TELEMETRY ROOM

## 2024-08-01 PROCEDURE — 11000001 HC ACUTE MED/SURG PRIVATE ROOM

## 2024-08-01 PROCEDURE — 36415 COLL VENOUS BLD VENIPUNCTURE: CPT

## 2024-08-01 PROCEDURE — 27000221 HC OXYGEN, UP TO 24 HOURS

## 2024-08-01 RX ADMIN — APIXABAN 2.5 MG: 2.5 TABLET, FILM COATED ORAL at 08:08

## 2024-08-01 RX ADMIN — POLYETHYLENE GLYCOL 3350 17 G: 17 POWDER, FOR SOLUTION ORAL at 08:08

## 2024-08-01 RX ADMIN — SENNOSIDES 8.6 MG: 8.6 TABLET, FILM COATED ORAL at 08:08

## 2024-08-01 RX ADMIN — FLUTICASONE FUROATE AND VILANTEROL TRIFENATATE 1 PUFF: 100; 25 POWDER RESPIRATORY (INHALATION) at 11:08

## 2024-08-01 RX ADMIN — PIPERACILLIN SODIUM AND TAZOBACTAM SODIUM 4.5 G: 4; .5 INJECTION, POWDER, FOR SOLUTION INTRAVENOUS at 01:08

## 2024-08-01 RX ADMIN — OXYCODONE HYDROCHLORIDE 5 MG: 5 TABLET ORAL at 04:08

## 2024-08-01 RX ADMIN — METOPROLOL SUCCINATE 100 MG: 50 TABLET, EXTENDED RELEASE ORAL at 08:08

## 2024-08-01 RX ADMIN — PIPERACILLIN SODIUM AND TAZOBACTAM SODIUM 4.5 G: 4; .5 INJECTION, POWDER, FOR SOLUTION INTRAVENOUS at 05:08

## 2024-08-01 RX ADMIN — PIPERACILLIN SODIUM AND TAZOBACTAM SODIUM 4.5 G: 4; .5 INJECTION, POWDER, FOR SOLUTION INTRAVENOUS at 08:08

## 2024-08-01 NOTE — ASSESSMENT & PLAN NOTE
Creatine stable for now. BMP reviewed- noted Estimated Creatinine Clearance: 34.7 mL/min (based on SCr of 1.3 mg/dL). according to latest data. Based on current GFR, CKD stage is stage 3 - GFR 30-59.  Monitor UOP and serial BMP and adjust therapy as needed. Renally dose meds. Avoid nephrotoxic medications and procedures.    Plan:   - Strict I&Os and daily weights   - Trend Cr  - Strict I&Os  - Avoid nephrotoxic agents (NSAIDs, ACEi/Arbs, aminoglycoside abx, zozyn, amphotericin, tenofovir)  - Renally adjust medications (metformin, gabapentin, cefepime, morphine)

## 2024-08-01 NOTE — PLAN OF CARE
MARINE completed the LOCET via phone. SW faxed [facility] to obtain the 142 for NH admission.                     SULEIMAN Phillips, LMSW  [facility]  Case Management  Ext. [phone]

## 2024-08-01 NOTE — PLAN OF CARE
Discharge Plan A and Plan B have been determined by review of patient's clinical status, future medical and therapeutic needs, and coverage/benefits for post-acute care in coordination with multidisciplinary team members.    07/31/24 1600   Post-Acute Status   Post-Acute Authorization Placement   Post-Acute Placement Status Referrals Sent   Coverage [facility]VANTAGE [facility] ADV -   Discharge Plan   Discharge Plan A Home with family;Home Health   Discharge Plan B Skilled Nursing Facility     SW met with patient and daughters Sirisha and Esther to review discharge recommendation of SNF and is agreeable to plan. Daughters state that preference is [facility]. SW advised daughters to provide additional preferences to plan for the event that [facility] has no bed available. Daughters requesting additional time to review list to provide additional preferences. Daughters to provide additional preferences tomorrow.     Patient/family provided list of facilities in-network with patient's payor plan. Providers that are owned, operated, or affiliated with [facility] are included on the list.     Notified that referral sent to below listed facilities from in-network list based on proximity to home/family support:   [facility]    Patient/family instructed to identify preference.    Preferred Facility: (if more than 1, listed in order of descending preference)  [facility]    If an additional preferred facility not listed above is identified, additional referral to be sent. If above facilities unable to accept, will send additional referrals to in-network providers.        2:00pm  Daughter provided additional preference. SW sent additional referral via care port to below listed SNF.    Cathy HAWTHORNE will continue to follow.                        SULEIMAN Phillips, LMSW  [facility]  Case Management  Ext. [phone]

## 2024-08-01 NOTE — ASSESSMENT & PLAN NOTE
No signs of decompensation. Stable RLE edema. No orthopnea, no rales.    - Continue home metoprolol  - Plans to resume lasix 8/2

## 2024-08-01 NOTE — PROGRESS NOTES
[facility] ([address] Medicine  Progress Note    Patient Name: Misa Barajas  MRN: [id number]  Patient Class: IP- Inpatient   Admission Date: 7/26/2024  Length of Stay: 5 days  Attending Physician: Brea Casanova DO  Primary Care Provider: Celia Carlisle MD        Subjective:     Principal Problem:Acute biliary pancreatitis        HPI:  Misa Barajas is a 78F with a PMHx of HTN, HLD, HFpEF, COPD (on 2L NC baseline O2 sat 92%) and pAF who presented to [facility] 7/26 for acute onset abdominal pain that radiated to her back. States she has RUQ and epigastric pain that started acutely this afternoon. She rates her pain as 10/10 intensity, sharp and non-pleuritic in nature. She reports a past history of nephrolithiasis but states that this pain is different. Pain is not post-prandial in nature and explains that this is differen than her past episodes of nephrolithiasis. SOB started at around thesame time and she normally is on 2L/min of at home oxygen due to her COPD. She states that she was watching TV when the pain suddenly started. She denies nausea, vomiting, fever, chills, or LUTS including hamturia or dysuria. Recent admission 7/12-7/18 for pasturella bactermia d/t cat bite, completed 2wk CTX (EOT 7/26, midline removed 7/26). No recent changes in appetite reported. She also denies diarrhea, constipation.     In ED, Slightly hypertensive 172/83, no tachycardia, on 2L NC saturating 98%, afebrile. CBC with slightly worsened anemia (Hb 8.9, baseline seems approx 10), no leukocytosis. CMP with hypokalemia K 3.4, hypercalcemia (corrected 11.5), Alb 2.6, LFT elevation including T. Bili 1.1, AST//54, . Lipase >3000. , troponin normal. CTA (obtained for aortic dissection r/o) showed cholelithiasis Distended BG with pericholecystic fluid and cholelithiasis. No intrahepatic or extrahepatic biliary ductal dilatation. CTA showed cholelithiasis so we obtained a right upper  quadrant ultrasound as well that confirmed the cholelithiasis and could not necessarily rule out cholecystitis. Gave CTX and 1L IVF.     Overview/Hospital Course:  CTA Abdomen/pelvis (7/26/24) revealed non-obstruction renal calculi, cholelithiasis, possible early cholecystitis, no signs of choledocholithiasis, and stranding in the pancreas suggestive of pancreatits. MRCP on 7/28/24 showed cholelithiasis and pancreatitis without choledocolithiasis or cholecystitis. General surgery consulted and recommends a HIDA to definitively rule out Cholecystitis. If HIDA is negative, general surgery recommends an ERCP with sphincterotomy instead of a cholecystectomy due to co-morbidities increasing surgical risk. HIDA scan was negative for cholecystitis. Patient underwent an ERCP with sphincterotomy 7/30/24. Pain controlled, PT/OT with recommendations for moderate intensity therapy. SW aware.     Interval History: s/p ERCP with sphincterotomy 7/30. Tolerating meals, pain controlled. No complaints at this time.      Review of Systems   Constitutional:  Negative for chills, diaphoresis and fever.   Respiratory:  Positive for shortness of breath. Negative for cough.         Patient requires 2L oxygen at home   Cardiovascular:  Negative for chest pain, palpitations and leg swelling.   Gastrointestinal:  Negative for abdominal pain, constipation, diarrhea, nausea and vomiting.   Genitourinary:  Negative for difficulty urinating and dysuria.   Musculoskeletal:  Negative for arthralgias and myalgias.   Skin:  Negative for color change.   Neurological:  Negative for dizziness and headaches.     Objective:     Vital Signs (Most Recent):  Temp: 98.4 °F (36.9 °C) (08/01/24 1148)  Pulse: 85 (08/01/24 1149)  Resp: 18 (08/01/24 1148)  BP: (!) 157/76 (08/01/24 0654)  SpO2: (!) 91 % (08/01/24 1149) Vital Signs (24h Range):  Temp:  [98.2 °F (36.8 °C)-98.4 °F (36.9 °C)] 98.4 °F (36.9 °C)  Pulse:  [79-97] 85  Resp:  [18] 18  SpO2:  [91 %-95 %] 91  %  BP: (151-157)/(68-76) 157/76     Weight: 77.1 kg (169 lb 15.6 oz)  Body mass index is 29.18 kg/m².    Intake/Output Summary (Last 24 hours) at 8/1/2024 1444  Last data filed at 8/1/2024 1356  Gross per 24 hour   Intake 1376.77 ml   Output 800 ml   Net 576.77 ml         Physical Exam  Constitutional:       General: She is not in acute distress.     Appearance: Normal appearance. She is not ill-appearing.   HENT:      Head: Normocephalic and atraumatic.   Eyes:      General: No scleral icterus.     Conjunctiva/sclera: Conjunctivae normal.   Cardiovascular:      Rate and Rhythm: Normal rate and regular rhythm.      Pulses: Normal pulses.      Heart sounds: Normal heart sounds.   Pulmonary:      Effort: Pulmonary effort is normal.      Breath sounds: Normal breath sounds.   Abdominal:      General: Abdomen is flat. Bowel sounds are normal. There is no distension.      Palpations: Abdomen is soft.      Tenderness: There is no abdominal tenderness. There is no rebound.   Musculoskeletal:         General: Normal range of motion.      Cervical back: Normal range of motion and neck supple.      Right lower leg: Edema present.      Left lower leg: Edema present.      Comments: Mild pitting edema in lower extremities bilaterally    Skin:     General: Skin is warm and dry.      Capillary Refill: Capillary refill takes less than 2 seconds.      Coloration: Skin is not jaundiced.   Neurological:      Mental Status: She is alert and oriented to person, place, and time.   Psychiatric:         Mood and Affect: Mood normal.         Behavior: Behavior normal.             Significant Labs: All pertinent labs within the past 24 hours have been reviewed.    Significant Imaging: I have reviewed all pertinent imaging results/findings within the past 24 hours.    Assessment/Plan:      * Acute biliary pancreatitis  Sudden onset abdominal pain not a/w eating c/f gallstone pancreatitis (given cholelithiasis seen on CTA and US). No fever,  chills, or other s/s infection to suggest cholangitis. Does have elevated T bili and AST/ALT. Severe abdominal pain. No nausea or vomiting. No acute alcohol intake. Slight hypercalcemia of unknown etiology, however not profound enough to explain acute pancreatitis. Lipase >3000. CT AP with peripancreatic stranding and edema, greatest about the head and neck, favored to be related to pancreatitis. Location of pain plus CT findings and lab findings do not suggest nephrolithiasis as etiology.     - AES consulted given LFT elevation, appreciate recommendations  - MRCP to better delineate biliary obstruction  - No antibiotics as low concern for cholangitis, would start Zosyn if febrile or becoming hypotensive  - cautious with fluids given CHF (no signs of decompensation, got 1L in ED)  - analgesic therapy  -General surgery recommends HIDA to definitively r/o cholecystitis  -If HIDA negative, General Surgery recommends ERCP w/ sphincterotomy due to co-morbidities increasing surgical risk   -Gastroenterology states cholecystectomy is best first line treatment, but an ERCP with sphincterectomy is a good second line treatment if cholecystectomy cannot be done    -HIDA was negative for cholecystitis;     ERCP with sphincterotomy performed by [facility]      COPD (chronic obstructive pulmonary disease)  Patient's COPD is controlled currently.  Patient is currently off COPD Pathway. Continue scheduled inhalers Supplemental oxygen and monitor respiratory status closely.     No wheezing, no s/s respiratory illness. On home 2L NC.     - No Brezztri on formulary, started on breo    Chronic diastolic heart failure  No signs of decompensation. Stable RLE edema. No orthopnea, no rales.    - Continue home metoprolol  - Plans to resume lasix 8/2      Paroxysmal atrial fibrillation  Patient with Paroxysmal (<7 days) atrial fibrillation which is controlled currently with Beta Blocker. Patient is currently in sinus rhythm.[id number] Score: 3.  Anticoagulation not indicated due to possible procedure .    CKD (chronic kidney disease) stage 3, GFR 30-59 ml/min  Creatine stable for now. BMP reviewed- noted Estimated Creatinine Clearance: 34.7 mL/min (based on SCr of 1.3 mg/dL). according to latest data. Based on current GFR, CKD stage is stage 3 - GFR 30-59.  Monitor UOP and serial BMP and adjust therapy as needed. Renally dose meds. Avoid nephrotoxic medications and procedures.    Plan:   - Strict I&Os and daily weights   - Trend Cr  - Strict I&Os  - Avoid nephrotoxic agents (NSAIDs, ACEi/Arbs, aminoglycoside abx, zozyn, amphotericin, tenofovir)  - Renally adjust medications (metformin, gabapentin, cefepime, morphine)    Secondary hyperparathyroidism of renal origin  Causing slight hypercalcemia.     - Check calcitriol   - CTM CMP      Essential hypertension  Chronic, uncontrolled. Latest blood pressure and vitals reviewed-     Temp:  [96.2 °F (35.7 °C)-98.6 °F (37 °C)]   Pulse:  [63-86]   Resp:  [12-22]   BP: (132-202)/(60-89)   SpO2:  [91 %-98 %] .   Home meds for hypertension were reviewed and noted below.   Hypertension Medications               furosemide (LASIX) 40 MG tablet Take 1 tablet (40 mg total) by mouth once daily.    metoprolol succinate (TOPROL-XL) 100 MG 24 hr tablet Take 1 tablet (100 mg total) by mouth once daily.            While in the hospital, will manage blood pressure as follows; Continue home antihypertensive regimen    Currently holding Lasix due to MARQUEZ    Will utilize p.r.n. blood pressure medication only if patient's blood pressure greater than 220/110 and she develops symptoms such as worsening chest pain or shortness of breath.      VTE Risk Mitigation (From admission, onward)           Ordered     apixaban tablet 2.5 mg  2 times daily         07/31/24 9296     Reason for No Pharmacological VTE Prophylaxis  Once        Comments: Anticipate possible procerdure   Question:  Reasons:  Answer:  Physician Provided (leave comment)     07/27/24 0430     IP VTE HIGH RISK PATIENT  Once         07/27/24 0430     Place sequential compression device  Until discontinued         07/27/24 0430                    Discharge Planning   NOHEMI: 8/2/2024     Code Status: Full Code   Is the patient medically ready for discharge?:     Reason for patient still in hospital (select all that apply): Pending disposition  Discharge Plan A: Home with family, Home Health                  Michael Romero MD  Department of Hospital Medicine   [facility] ([place])

## 2024-08-01 NOTE — SUBJECTIVE & OBJECTIVE
Interval History: s/p ERCP with sphincterotomy 7/30. Tolerating meals, pain controlled. No complaints at this time.      Review of Systems   Constitutional:  Negative for chills, diaphoresis and fever.   Respiratory:  Positive for shortness of breath. Negative for cough.         Patient requires 2L oxygen at home   Cardiovascular:  Negative for chest pain, palpitations and leg swelling.   Gastrointestinal:  Negative for abdominal pain, constipation, diarrhea, nausea and vomiting.   Genitourinary:  Negative for difficulty urinating and dysuria.   Musculoskeletal:  Negative for arthralgias and myalgias.   Skin:  Negative for color change.   Neurological:  Negative for dizziness and headaches.     Objective:     Vital Signs (Most Recent):  Temp: 98.4 °F (36.9 °C) (08/01/24 1148)  Pulse: 85 (08/01/24 1149)  Resp: 18 (08/01/24 1148)  BP: (!) 157/76 (08/01/24 0654)  SpO2: (!) 91 % (08/01/24 1149) Vital Signs (24h Range):  Temp:  [98.2 °F (36.8 °C)-98.4 °F (36.9 °C)] 98.4 °F (36.9 °C)  Pulse:  [79-97] 85  Resp:  [18] 18  SpO2:  [91 %-95 %] 91 %  BP: (151-157)/(68-76) 157/76     Weight: 77.1 kg (169 lb 15.6 oz)  Body mass index is 29.18 kg/m².    Intake/Output Summary (Last 24 hours) at 8/1/2024 1444  Last data filed at 8/1/2024 1356  Gross per 24 hour   Intake 1376.77 ml   Output 800 ml   Net 576.77 ml         Physical Exam  Constitutional:       General: She is not in acute distress.     Appearance: Normal appearance. She is not ill-appearing.   HENT:      Head: Normocephalic and atraumatic.   Eyes:      General: No scleral icterus.     Conjunctiva/sclera: Conjunctivae normal.   Cardiovascular:      Rate and Rhythm: Normal rate and regular rhythm.      Pulses: Normal pulses.      Heart sounds: Normal heart sounds.   Pulmonary:      Effort: Pulmonary effort is normal.      Breath sounds: Normal breath sounds.   Abdominal:      General: Abdomen is flat. Bowel sounds are normal. There is no distension.      Palpations:  Abdomen is soft.      Tenderness: There is no abdominal tenderness. There is no rebound.   Musculoskeletal:         General: Normal range of motion.      Cervical back: Normal range of motion and neck supple.      Right lower leg: Edema present.      Left lower leg: Edema present.      Comments: Mild pitting edema in lower extremities bilaterally    Skin:     General: Skin is warm and dry.      Capillary Refill: Capillary refill takes less than 2 seconds.      Coloration: Skin is not jaundiced.   Neurological:      Mental Status: She is alert and oriented to person, place, and time.   Psychiatric:         Mood and Affect: Mood normal.         Behavior: Behavior normal.             Significant Labs: All pertinent labs within the past 24 hours have been reviewed.    Significant Imaging: I have reviewed all pertinent imaging results/findings within the past 24 hours.

## 2024-08-02 LAB
ALBUMIN SERPL BCP-MCNC: 1.6 G/DL (ref 3.5–5.2)
ALP SERPL-CCNC: 253 U/L (ref 55–135)
ALT SERPL W/O P-5'-P-CCNC: 68 U/L (ref 10–44)
ANION GAP SERPL CALC-SCNC: 9 MMOL/L (ref 8–16)
AST SERPL-CCNC: 143 U/L (ref 10–40)
BASOPHILS # BLD AUTO: 0.11 K/UL (ref 0–0.2)
BASOPHILS NFR BLD: 0.8 % (ref 0–1.9)
BILIRUB SERPL-MCNC: 1.9 MG/DL (ref 0.1–1)
BUN SERPL-MCNC: 24 MG/DL (ref 8–23)
CALCIUM SERPL-MCNC: 9.9 MG/DL (ref 8.7–10.5)
CHLORIDE SERPL-SCNC: 103 MMOL/L (ref 95–110)
CO2 SERPL-SCNC: 27 MMOL/L (ref 23–29)
CREAT SERPL-MCNC: 1.2 MG/DL (ref 0.5–1.4)
DIFFERENTIAL METHOD BLD: ABNORMAL
EOSINOPHIL # BLD AUTO: 0.4 K/UL (ref 0–0.5)
EOSINOPHIL NFR BLD: 2.8 % (ref 0–8)
ERYTHROCYTE [DISTWIDTH] IN BLOOD BY AUTOMATED COUNT: 14.6 % (ref 11.5–14.5)
EST. GFR  (NO RACE VARIABLE): 45.8 ML/MIN/1.73 M^2
GLUCOSE SERPL-MCNC: 128 MG/DL (ref 70–110)
HCT VFR BLD AUTO: 33.4 % (ref 37–48.5)
HGB BLD-MCNC: 10.6 G/DL (ref 12–16)
IMM GRANULOCYTES # BLD AUTO: 0.21 K/UL (ref 0–0.04)
IMM GRANULOCYTES NFR BLD AUTO: 1.6 % (ref 0–0.5)
LYMPHOCYTES # BLD AUTO: 1.7 K/UL (ref 1–4.8)
LYMPHOCYTES NFR BLD: 12.9 % (ref 18–48)
MAGNESIUM SERPL-MCNC: 1.9 MG/DL (ref 1.6–2.6)
MCH RBC QN AUTO: 29 PG (ref 27–31)
MCHC RBC AUTO-ENTMCNC: 31.7 G/DL (ref 32–36)
MCV RBC AUTO: 92 FL (ref 82–98)
MONOCYTES # BLD AUTO: 1.5 K/UL (ref 0.3–1)
MONOCYTES NFR BLD: 11.4 % (ref 4–15)
NEUTROPHILS # BLD AUTO: 9.3 K/UL (ref 1.8–7.7)
NEUTROPHILS NFR BLD: 70.5 % (ref 38–73)
NRBC BLD-RTO: 0 /100 WBC
PLATELET # BLD AUTO: 257 K/UL (ref 150–450)
PMV BLD AUTO: 9.9 FL (ref 9.2–12.9)
POTASSIUM SERPL-SCNC: 3.2 MMOL/L (ref 3.5–5.1)
PROT SERPL-MCNC: 5.4 G/DL (ref 6–8.4)
RBC # BLD AUTO: 3.65 M/UL (ref 4–5.4)
SODIUM SERPL-SCNC: 139 MMOL/L (ref 136–145)
WBC # BLD AUTO: 13.25 K/UL (ref 3.9–12.7)

## 2024-08-02 PROCEDURE — 83735 ASSAY OF MAGNESIUM: CPT

## 2024-08-02 PROCEDURE — 25000003 PHARM REV CODE 250

## 2024-08-02 PROCEDURE — 21400001 HC TELEMETRY ROOM

## 2024-08-02 PROCEDURE — 94761 N-INVAS EAR/PLS OXIMETRY MLT: CPT

## 2024-08-02 PROCEDURE — 94640 AIRWAY INHALATION TREATMENT: CPT

## 2024-08-02 PROCEDURE — 27000221 HC OXYGEN, UP TO 24 HOURS

## 2024-08-02 PROCEDURE — 99900035 HC TECH TIME PER 15 MIN (STAT)

## 2024-08-02 PROCEDURE — 11000001 HC ACUTE MED/SURG PRIVATE ROOM

## 2024-08-02 PROCEDURE — 85025 COMPLETE CBC W/AUTO DIFF WBC: CPT

## 2024-08-02 PROCEDURE — 63600175 PHARM REV CODE 636 W HCPCS

## 2024-08-02 PROCEDURE — 80053 COMPREHEN METABOLIC PANEL: CPT

## 2024-08-02 PROCEDURE — 25000003 PHARM REV CODE 250: Performed by: STUDENT IN AN ORGANIZED HEALTH CARE EDUCATION/TRAINING PROGRAM

## 2024-08-02 RX ADMIN — OXYCODONE HYDROCHLORIDE 5 MG: 5 TABLET ORAL at 04:08

## 2024-08-02 RX ADMIN — METOPROLOL SUCCINATE 100 MG: 50 TABLET, EXTENDED RELEASE ORAL at 09:08

## 2024-08-02 RX ADMIN — SENNOSIDES 8.6 MG: 8.6 TABLET, FILM COATED ORAL at 09:08

## 2024-08-02 RX ADMIN — FLUTICASONE FUROATE AND VILANTEROL TRIFENATATE 1 PUFF: 100; 25 POWDER RESPIRATORY (INHALATION) at 01:08

## 2024-08-02 RX ADMIN — APIXABAN 2.5 MG: 2.5 TABLET, FILM COATED ORAL at 09:08

## 2024-08-02 RX ADMIN — POTASSIUM BICARBONATE 40 MEQ: 391 TABLET, EFFERVESCENT ORAL at 09:08

## 2024-08-02 RX ADMIN — APIXABAN 5 MG: 5 TABLET, FILM COATED ORAL at 09:08

## 2024-08-02 RX ADMIN — PIPERACILLIN SODIUM AND TAZOBACTAM SODIUM 4.5 G: 4; .5 INJECTION, POWDER, FOR SOLUTION INTRAVENOUS at 05:08

## 2024-08-02 NOTE — ANESTHESIA POSTPROCEDURE EVALUATION
Anesthesia Post Evaluation    Patient: Misa Barajas    Procedure(s) Performed: Procedure(s) (LRB):  ERCP (ENDOSCOPIC RETROGRADE CHOLANGIOPANCREATOGRAPHY) (N/A)    Final Anesthesia Type: general      Patient location during evaluation: PACU  Patient participation: Yes- Able to Participate  Level of consciousness: awake and alert  Post-procedure vital signs: reviewed and stable  Pain management: adequate  Airway patency: patent    PONV status at discharge: No PONV  Anesthetic complications: no      Cardiovascular status: hemodynamically stable  Hydration status: euvolemic  Follow-up not needed.              Vitals Value Taken Time   /93 08/02/24 1155   Temp 36.8 °C (98.2 °F) 08/02/24 1155   Pulse 81 08/02/24 1234   Resp 17 08/02/24 1155   SpO2 94 % 08/02/24 1234   Vitals shown include unfiled device data.      Event Time   Out of Recovery 14:27:00         Pain/Neeraj Score: Pain Rating Prior to Med Admin: 7 (8/1/2024  4:52 PM)

## 2024-08-02 NOTE — PLAN OF CARE
Discharge Plan A and Plan B have been determined by review of patient's clinical status, future medical and therapeutic needs, and coverage/benefits for post-acute care in coordination with multidisciplinary team members.    08/02/24 1459   Post-Acute Status   Post-Acute Authorization Placement   Post-Acute Placement Status Pending post-acute provider review/more information requested   Coverage [employer] - [employer] ADV -   Discharge Plan   Discharge Plan A Home with family;Home Health   Discharge Plan B Skilled Nursing Facility     MARINE received notification via [email or web address] that [facility] services. MARINE requested that [facility] submit for auth today. MARINE phoned daughter Marina to review and Marina agreeable to patient dc to [facility]; states that if [facility] has a bed opening and accepts, daughter prefers [facility] as number 1 preference but is agreeable to [facility] if no [facility] bed available.       Patient not medically ready for dc at this time. NOHEMI 8/5.                                  Zhen Salas, SULEIMAN, LMSW  [facility]  Case Management  Ext. [phone]

## 2024-08-02 NOTE — ASSESSMENT & PLAN NOTE
Sudden onset abdominal pain not a/w eating c/f gallstone pancreatitis (given cholelithiasis seen on CTA and US). No fever, chills, or other s/s infection to suggest cholangitis. Does have elevated T bili and AST/ALT. Severe abdominal pain. No nausea or vomiting. No acute alcohol intake. Slight hypercalcemia of unknown etiology, however not profound enough to explain acute pancreatitis. Lipase >3000. CT AP with peripancreatic stranding and edema, greatest about the head and neck, favored to be related to pancreatitis. Location of pain plus CT findings and lab findings do not suggest nephrolithiasis as etiology.     - AES consulted given LFT elevation, appreciate recommendations  - MRCP to better delineate biliary obstruction  - No antibiotics as low concern for cholangitis, would start Zosyn if febrile or becoming hypotensive  - cautious with fluids given CHF (no signs of decompensation, got 1L in ED)  - analgesic therapy  -General surgery recommends HIDA to definitively r/o cholecystitis  -If HIDA negative, General Surgery recommends ERCP w/ sphincterotomy due to co-morbidities increasing surgical risk   -Gastroenterology states cholecystectomy is best first line treatment, but an ERCP with sphincterectomy is a good second line treatment if cholecystectomy cannot be done    -HIDA was negative for cholecystitis;     ERCP with sphincterotomy performed by AES    Patient pain has greatly improved; no tenderness present; handling meals without issue

## 2024-08-02 NOTE — PT/OT/SLP PROGRESS
Physical Therapy      Patient Name:  Misa Barajas   MRN:  [id number]    Patient not seen today secondary to  (pt. sleeping/tired in AM and stomach pain in PM). Will follow-up over weekend.    Uche Carlson, PT  8/2/2024

## 2024-08-02 NOTE — SUBJECTIVE & OBJECTIVE
Interval History: Patient was no longer tender to palpation and able to eat her meals without issue. Did report some LRQ pain in the evening. Will continue to monitor. Patient awaiting placement in SNF.      Review of Systems   Constitutional:  Negative for chills, diaphoresis and fever.   Respiratory:  Positive for shortness of breath. Negative for cough.         Patient requires 2L oxygen at home   Cardiovascular:  Positive for leg swelling. Negative for chest pain and palpitations.   Gastrointestinal:  Positive for abdominal pain. Negative for constipation, diarrhea, nausea and vomiting.        Patient did not report any pain in the morning, but did report some LRQ pain in the evening. Monitoring   Genitourinary:  Negative for difficulty urinating and dysuria.   Musculoskeletal:  Negative for arthralgias and myalgias.   Skin:  Negative for color change.   Neurological:  Negative for dizziness and headaches.     Objective:     Vital Signs (Most Recent):  Temp: 98.2 °F (36.8 °C) (08/02/24 1633)  Pulse: 85 (08/02/24 1633)  Resp: 18 (08/02/24 1646)  BP: (!) 172/84 (08/02/24 1633)  SpO2: 95 % (08/02/24 1538) Vital Signs (24h Range):  Temp:  [97.6 °F (36.4 °C)-98.2 °F (36.8 °C)] 98.2 °F (36.8 °C)  Pulse:  [68-85] 85  Resp:  [17-18] 18  SpO2:  [90 %-95 %] 95 %  BP: (147-172)/(62-93) 172/84     Weight: 77.1 kg (169 lb 15.6 oz)  Body mass index is 29.18 kg/m².    Intake/Output Summary (Last 24 hours) at 8/2/2024 1831  Last data filed at 8/2/2024 0812  Gross per 24 hour   Intake 827.41 ml   Output 200 ml   Net 627.41 ml         Physical Exam  Constitutional:       General: She is not in acute distress.     Appearance: Normal appearance. She is not ill-appearing.   HENT:      Head: Normocephalic and atraumatic.   Eyes:      General: No scleral icterus.     Conjunctiva/sclera: Conjunctivae normal.   Cardiovascular:      Rate and Rhythm: Normal rate and regular rhythm.      Pulses: Normal pulses.      Heart sounds: Normal  heart sounds.   Pulmonary:      Effort: Pulmonary effort is normal.      Breath sounds: Normal breath sounds.   Abdominal:      General: Abdomen is flat. Bowel sounds are normal. There is no distension.      Palpations: Abdomen is soft.      Tenderness: There is no rebound.      Comments: Patient reported no tenderness this morning    Musculoskeletal:         General: Normal range of motion.      Cervical back: Normal range of motion and neck supple.      Right lower leg: Edema present.      Left lower leg: Edema present.      Comments: Mild pitting edema in lower extremities bilaterally    Skin:     General: Skin is warm and dry.      Capillary Refill: Capillary refill takes less than 2 seconds.      Coloration: Skin is not jaundiced.   Neurological:      Mental Status: She is alert and oriented to person, place, and time.   Psychiatric:         Mood and Affect: Mood normal.         Behavior: Behavior normal.             Significant Labs: All pertinent labs within the past 24 hours have been reviewed.    Significant Imaging: I have reviewed all pertinent imaging results/findings within the past 24 hours.

## 2024-08-02 NOTE — PROGRESS NOTES
[facility] ([address] Medicine  Progress Note    Patient Name: Misa Barajas  MRN: [id number]  Patient Class: IP- Inpatient   Admission Date: 7/26/2024  Length of Stay: 6 days  Attending Physician: Brea Casanova DO  Primary Care Provider: Celia Carlisle MD        Subjective:     Principal Problem:Acute biliary pancreatitis        HPI:  Misa Barajas is a 78F with a PMHx of HTN, HLD, HFpEF, COPD (on 2L NC baseline O2 sat 92%) and pAF who presented to [facility] 7/26 for acute onset abdominal pain that radiated to her back. States she has RUQ and epigastric pain that started acutely this afternoon. She rates her pain as 10/10 intensity, sharp and non-pleuritic in nature. She reports a past history of nephrolithiasis but states that this pain is different. Pain is not post-prandial in nature and explains that this is differen than her past episodes of nephrolithiasis. SOB started at around thesame time and she normally is on 2L/min of at home oxygen due to her COPD. She states that she was watching TV when the pain suddenly started. She denies nausea, vomiting, fever, chills, or LUTS including hamturia or dysuria. Recent admission 7/12-7/18 for pasturella bactermia d/t cat bite, completed 2wk CTX (EOT 7/26, midline removed 7/26). No recent changes in appetite reported. She also denies diarrhea, constipation.     In ED, Slightly hypertensive 172/83, no tachycardia, on 2L NC saturating 98%, afebrile. CBC with slightly worsened anemia (Hb 8.9, baseline seems approx 10), no leukocytosis. CMP with hypokalemia K 3.4, hypercalcemia (corrected 11.5), Alb 2.6, LFT elevation including T. Bili 1.1, AST//54, . Lipase >3000. , troponin normal. CTA (obtained for aortic dissection r/o) showed cholelithiasis Distended BG with pericholecystic fluid and cholelithiasis. No intrahepatic or extrahepatic biliary ductal dilatation. CTA showed cholelithiasis so we obtained a right upper  quadrant ultrasound as well that confirmed the cholelithiasis and could not necessarily rule out cholecystitis. Gave CTX and 1L IVF.     Overview/Hospital Course:  CTA Abdomen/pelvis (7/26/24) revealed non-obstruction renal calculi, cholelithiasis, possible early cholecystitis, no signs of choledocholithiasis, and stranding in the pancreas suggestive of pancreatits. MRCP on 7/28/24 showed cholelithiasis and pancreatitis without choledocolithiasis or cholecystitis. General surgery consulted and recommends a HIDA to definitively rule out Cholecystitis. If HIDA is negative, general surgery recommends an ERCP with sphincterotomy instead of a cholecystectomy due to co-morbidities increasing surgical risk. HIDA scan was negative for cholecystitis. Patient underwent an ERCP with sphincterotomy 7/30/24. Pain controlled, PT/OT with recommendations for moderate intensity therapy. SW aware. Pain continued to improve and on the morning of 8/2 the patient was no longer tender to palpation. She did however, have some pain that evening.     Interval History: Patient was no longer tender to palpation and able to eat her meals without issue. Did report some LRQ pain in the evening. Will continue to monitor. Patient awaiting placement in SNF.      Review of Systems   Constitutional:  Negative for chills, diaphoresis and fever.   Respiratory:  Positive for shortness of breath. Negative for cough.         Patient requires 2L oxygen at home   Cardiovascular:  Positive for leg swelling. Negative for chest pain and palpitations.   Gastrointestinal:  Positive for abdominal pain. Negative for constipation, diarrhea, nausea and vomiting.        Patient did not report any pain in the morning, but did report some LRQ pain in the evening. Monitoring   Genitourinary:  Negative for difficulty urinating and dysuria.   Musculoskeletal:  Negative for arthralgias and myalgias.   Skin:  Negative for color change.   Neurological:  Negative for  dizziness and headaches.     Objective:     Vital Signs (Most Recent):  Temp: 98.2 °F (36.8 °C) (08/02/24 1633)  Pulse: 85 (08/02/24 1633)  Resp: 18 (08/02/24 1646)  BP: (!) 172/84 (08/02/24 1633)  SpO2: 95 % (08/02/24 1538) Vital Signs (24h Range):  Temp:  [97.6 °F (36.4 °C)-98.2 °F (36.8 °C)] 98.2 °F (36.8 °C)  Pulse:  [68-85] 85  Resp:  [17-18] 18  SpO2:  [90 %-95 %] 95 %  BP: (147-172)/(62-93) 172/84     Weight: 77.1 kg (169 lb 15.6 oz)  Body mass index is 29.18 kg/m².    Intake/Output Summary (Last 24 hours) at 8/2/2024 1831  Last data filed at 8/2/2024 0812  Gross per 24 hour   Intake 827.41 ml   Output 200 ml   Net 627.41 ml         Physical Exam  Constitutional:       General: She is not in acute distress.     Appearance: Normal appearance. She is not ill-appearing.   HENT:      Head: Normocephalic and atraumatic.   Eyes:      General: No scleral icterus.     Conjunctiva/sclera: Conjunctivae normal.   Cardiovascular:      Rate and Rhythm: Normal rate and regular rhythm.      Pulses: Normal pulses.      Heart sounds: Normal heart sounds.   Pulmonary:      Effort: Pulmonary effort is normal.      Breath sounds: Normal breath sounds.   Abdominal:      General: Abdomen is flat. Bowel sounds are normal. There is no distension.      Palpations: Abdomen is soft.      Tenderness: There is no rebound.      Comments: Patient reported no tenderness this morning    Musculoskeletal:         General: Normal range of motion.      Cervical back: Normal range of motion and neck supple.      Right lower leg: Edema present.      Left lower leg: Edema present.      Comments: Mild pitting edema in lower extremities bilaterally    Skin:     General: Skin is warm and dry.      Capillary Refill: Capillary refill takes less than 2 seconds.      Coloration: Skin is not jaundiced.   Neurological:      Mental Status: She is alert and oriented to person, place, and time.   Psychiatric:         Mood and Affect: Mood normal.          Behavior: Behavior normal.             Significant Labs: All pertinent labs within the past 24 hours have been reviewed.    Significant Imaging: I have reviewed all pertinent imaging results/findings within the past 24 hours.    Assessment/Plan:      * Acute biliary pancreatitis  Sudden onset abdominal pain not a/w eating c/f gallstone pancreatitis (given cholelithiasis seen on CTA and US). No fever, chills, or other s/s infection to suggest cholangitis. Does have elevated T bili and AST/ALT. Severe abdominal pain. No nausea or vomiting. No acute alcohol intake. Slight hypercalcemia of unknown etiology, however not profound enough to explain acute pancreatitis. Lipase >3000. CT AP with peripancreatic stranding and edema, greatest about the head and neck, favored to be related to pancreatitis. Location of pain plus CT findings and lab findings do not suggest nephrolithiasis as etiology.     - AES consulted given LFT elevation, appreciate recommendations  - MRCP to better delineate biliary obstruction  - No antibiotics as low concern for cholangitis, would start Zosyn if febrile or becoming hypotensive  - cautious with fluids given CHF (no signs of decompensation, got 1L in ED)  - analgesic therapy  -General surgery recommends HIDA to definitively r/o cholecystitis  -If HIDA negative, General Surgery recommends ERCP w/ sphincterotomy due to co-morbidities increasing surgical risk   -Gastroenterology states cholecystectomy is best first line treatment, but an ERCP with sphincterectomy is a good second line treatment if cholecystectomy cannot be done    -HIDA was negative for cholecystitis;     ERCP with sphincterotomy performed by AES    Patient pain has greatly improved; no tenderness present; handling meals without issue      COPD (chronic obstructive pulmonary disease)  Patient's COPD is controlled currently.  Patient is currently off COPD Pathway. Continue scheduled inhalers Supplemental oxygen and monitor  respiratory status closely.     No wheezing, no s/s respiratory illness. On home 2L NC.     - No Brezztri on formulary, started on breo    Chronic diastolic heart failure  No signs of decompensation. Stable RLE edema. No orthopnea, no rales.    - Continue home metoprolol  - Plans to resume lasix 8/2      Paroxysmal atrial fibrillation  Patient with Paroxysmal (<7 days) atrial fibrillation which is controlled currently with Beta Blocker. Patient is currently in sinus rhythm.[id number] Score: 3. Anticoagulation not indicated due to possible procedure .    CKD (chronic kidney disease) stage 3, GFR 30-59 ml/min  Creatine stable for now. BMP reviewed- noted Estimated Creatinine Clearance: 34.7 mL/min (based on SCr of 1.3 mg/dL). according to latest data. Based on current GFR, CKD stage is stage 3 - GFR 30-59.  Monitor UOP and serial BMP and adjust therapy as needed. Renally dose meds. Avoid nephrotoxic medications and procedures.    Plan:   - Strict I&Os and daily weights   - Trend Cr  - Strict I&Os  - Avoid nephrotoxic agents (NSAIDs, ACEi/Arbs, aminoglycoside abx, zozyn, amphotericin, tenofovir)  - Renally adjust medications (metformin, gabapentin, cefepime, morphine)    Secondary hyperparathyroidism of renal origin  Causing slight hypercalcemia.     - Check calcitriol   - CTM CMP      Essential hypertension  Chronic, uncontrolled. Latest blood pressure and vitals reviewed-     Temp:  [96.2 °F (35.7 °C)-98.6 °F (37 °C)]   Pulse:  [63-86]   Resp:  [12-22]   BP: (132-202)/(60-89)   SpO2:  [91 %-98 %] .   Home meds for hypertension were reviewed and noted below.   Hypertension Medications               furosemide (LASIX) 40 MG tablet Take 1 tablet (40 mg total) by mouth once daily.    metoprolol succinate (TOPROL-XL) 100 MG 24 hr tablet Take 1 tablet (100 mg total) by mouth once daily.            While in the hospital, will manage blood pressure as follows; Continue home antihypertensive regimen    Currently holding Lasix  due to MARQUEZ    Will utilize p.r.n. blood pressure medication only if patient's blood pressure greater than 220/110 and she develops symptoms such as worsening chest pain or shortness of breath.      VTE Risk Mitigation (From admission, onward)           Ordered     apixaban tablet 2.5 mg  2 times daily         07/31/24 1629     Reason for No Pharmacological VTE Prophylaxis  Once        Comments: Anticipate possible procerdure   Question:  Reasons:  Answer:  Physician Provided (leave comment)    07/27/24 0430     IP VTE HIGH RISK PATIENT  Once         07/27/24 0430     Place sequential compression device  Until discontinued         07/27/24 0430                    Discharge Planning   NOHEMI: 8/5/2024     Code Status: Full Code   Is the patient medically ready for discharge?:     Reason for patient still in hospital (select all that apply): Pending disposition  Discharge Plan A: Home with family, Home Health                  Karey Liang MD  Department of Hospital Medicine   [facility] ([place])

## 2024-08-03 LAB
ALBUMIN SERPL BCP-MCNC: 1.5 G/DL (ref 3.5–5.2)
ALP SERPL-CCNC: 328 U/L (ref 55–135)
ALT SERPL W/O P-5'-P-CCNC: 86 U/L (ref 10–44)
ANION GAP SERPL CALC-SCNC: 7 MMOL/L (ref 8–16)
AST SERPL-CCNC: 181 U/L (ref 10–40)
BASOPHILS # BLD AUTO: 0.1 K/UL (ref 0–0.2)
BASOPHILS NFR BLD: 0.7 % (ref 0–1.9)
BILIRUB SERPL-MCNC: 1.6 MG/DL (ref 0.1–1)
BUN SERPL-MCNC: 22 MG/DL (ref 8–23)
CALCIUM SERPL-MCNC: 9.6 MG/DL (ref 8.7–10.5)
CHLORIDE SERPL-SCNC: 104 MMOL/L (ref 95–110)
CO2 SERPL-SCNC: 28 MMOL/L (ref 23–29)
CREAT SERPL-MCNC: 1 MG/DL (ref 0.5–1.4)
DIFFERENTIAL METHOD BLD: ABNORMAL
EOSINOPHIL # BLD AUTO: 0.4 K/UL (ref 0–0.5)
EOSINOPHIL NFR BLD: 2.9 % (ref 0–8)
ERYTHROCYTE [DISTWIDTH] IN BLOOD BY AUTOMATED COUNT: 14.5 % (ref 11.5–14.5)
EST. GFR  (NO RACE VARIABLE): 57 ML/MIN/1.73 M^2
GLUCOSE SERPL-MCNC: 85 MG/DL (ref 70–110)
HCT VFR BLD AUTO: 32.7 % (ref 37–48.5)
HGB BLD-MCNC: 10.1 G/DL (ref 12–16)
IMM GRANULOCYTES # BLD AUTO: 0.29 K/UL (ref 0–0.04)
IMM GRANULOCYTES NFR BLD AUTO: 2 % (ref 0–0.5)
LYMPHOCYTES # BLD AUTO: 1.7 K/UL (ref 1–4.8)
LYMPHOCYTES NFR BLD: 11.6 % (ref 18–48)
MAGNESIUM SERPL-MCNC: 2 MG/DL (ref 1.6–2.6)
MCH RBC QN AUTO: 28.5 PG (ref 27–31)
MCHC RBC AUTO-ENTMCNC: 30.9 G/DL (ref 32–36)
MCV RBC AUTO: 92 FL (ref 82–98)
MONOCYTES # BLD AUTO: 1.7 K/UL (ref 0.3–1)
MONOCYTES NFR BLD: 12 % (ref 4–15)
NEUTROPHILS # BLD AUTO: 10.2 K/UL (ref 1.8–7.7)
NEUTROPHILS NFR BLD: 70.8 % (ref 38–73)
NRBC BLD-RTO: 0 /100 WBC
PLATELET # BLD AUTO: 252 K/UL (ref 150–450)
PMV BLD AUTO: 9.8 FL (ref 9.2–12.9)
POTASSIUM SERPL-SCNC: 3.5 MMOL/L (ref 3.5–5.1)
PROT SERPL-MCNC: 5.1 G/DL (ref 6–8.4)
RBC # BLD AUTO: 3.55 M/UL (ref 4–5.4)
SODIUM SERPL-SCNC: 139 MMOL/L (ref 136–145)
WBC # BLD AUTO: 14.36 K/UL (ref 3.9–12.7)

## 2024-08-03 PROCEDURE — 80053 COMPREHEN METABOLIC PANEL: CPT

## 2024-08-03 PROCEDURE — 94640 AIRWAY INHALATION TREATMENT: CPT

## 2024-08-03 PROCEDURE — 36415 COLL VENOUS BLD VENIPUNCTURE: CPT

## 2024-08-03 PROCEDURE — 27000221 HC OXYGEN, UP TO 24 HOURS

## 2024-08-03 PROCEDURE — 83735 ASSAY OF MAGNESIUM: CPT

## 2024-08-03 PROCEDURE — 25000003 PHARM REV CODE 250

## 2024-08-03 PROCEDURE — 21400001 HC TELEMETRY ROOM

## 2024-08-03 PROCEDURE — 94761 N-INVAS EAR/PLS OXIMETRY MLT: CPT

## 2024-08-03 PROCEDURE — 25000003 PHARM REV CODE 250: Performed by: STUDENT IN AN ORGANIZED HEALTH CARE EDUCATION/TRAINING PROGRAM

## 2024-08-03 PROCEDURE — 85025 COMPLETE CBC W/AUTO DIFF WBC: CPT

## 2024-08-03 RX ADMIN — APIXABAN 5 MG: 5 TABLET, FILM COATED ORAL at 10:08

## 2024-08-03 RX ADMIN — SENNOSIDES 8.6 MG: 8.6 TABLET, FILM COATED ORAL at 10:08

## 2024-08-03 RX ADMIN — METOPROLOL SUCCINATE 100 MG: 50 TABLET, EXTENDED RELEASE ORAL at 10:08

## 2024-08-03 RX ADMIN — APIXABAN 5 MG: 5 TABLET, FILM COATED ORAL at 09:08

## 2024-08-03 RX ADMIN — OXYCODONE HYDROCHLORIDE 5 MG: 5 TABLET ORAL at 09:08

## 2024-08-03 RX ADMIN — FLUTICASONE FUROATE AND VILANTEROL TRIFENATATE 1 PUFF: 100; 25 POWDER RESPIRATORY (INHALATION) at 09:08

## 2024-08-03 RX ADMIN — POLYETHYLENE GLYCOL 3350 17 G: 17 POWDER, FOR SOLUTION ORAL at 10:08

## 2024-08-03 RX ADMIN — OXYCODONE HYDROCHLORIDE 5 MG: 5 TABLET ORAL at 03:08

## 2024-08-03 NOTE — PT/OT/SLP PROGRESS
Physical Therapy  Not Seen  Patient Name:  Misa Barajas   MRN:  [id number]  Admitting Diagnosis:  Acute biliary pancreatitis   Recent Surgery: Procedure(s) (LRB):  ERCP (ENDOSCOPIC RETROGRADE CHOLANGIOPANCREATOGRAPHY) (N/A) 4 Days Post-Op  Admit Date: 7/26/2024  Length of Stay: 7 days    Patient not seen on this date for PT evaluation - patient adamantly refusing to participate in mobility today despite encouragement and education from therapist and family members. PT will check back tomorrow to complete evaluation as patient is able to participate. She has declined participation since her admission, and if she continues to refuse therapy orders will be discontinued.     Tenisha Foote, PT, DPT  8/3/2024

## 2024-08-03 NOTE — PLAN OF CARE
Problem: Adult Inpatient Plan of Care  Goal: Plan of Care Review  Outcome: Progressing  Goal: Patient-Specific Goal (Individualized)  Outcome: Progressing  Goal: Absence of Hospital-Acquired Illness or Injury  Outcome: Progressing  Goal: Optimal Comfort and Wellbeing  Outcome: Progressing  Goal: Readiness for Transition of Care  Outcome: Progressing     Problem: Acute Kidney Injury/Impairment  Goal: Fluid and Electrolyte Balance  Outcome: Progressing  Goal: Improved Oral Intake  Outcome: Progressing  Goal: Effective Renal Function  Outcome: Progressing     Problem: Wound  Goal: Optimal Coping  Outcome: Progressing  Goal: Optimal Functional Ability  Outcome: Progressing  Goal: Absence of Infection Signs and Symptoms  Outcome: Progressing  Goal: Improved Oral Intake  Outcome: Progressing  Goal: Optimal Pain Control and Function  Outcome: Progressing  Goal: Skin Health and Integrity  Outcome: Progressing  Goal: Optimal Wound Healing  Outcome: Progressing    Problem: Skin Injury Risk Increased  Goal: Skin Health and Integrity  Outcome: Progressing     Problem: Pain Acute  Goal: Optimal Pain Control and Function  Outcome: Progressing     Problem: Infection  Goal: Absence of Infection Signs and Symptoms  Outcome: Progressing   Pt AAO X 4; able to express needs.   Pain managed with PO Oxy.  IV ABX completed.  Eliquis given last night.  Pure wick in place and working well.   Safety maintained.  Bed in low position,  call  light in reach.

## 2024-08-03 NOTE — PROGRESS NOTES
[facility] ([address] Medicine  Progress Note    Patient Name: Misa Barajas  MRN: [id number]  Patient Class: IP- Inpatient   Admission Date: 7/26/2024  Length of Stay: 7 days  Attending Physician: Jamey Raya, *  Primary Care Provider: Celia Carlisle MD        Subjective:     Principal Problem:Acute biliary pancreatitis        HPI:  Misa Barajas is a 78F with a PMHx of HTN, HLD, HFpEF, COPD (on 2L NC baseline O2 sat 92%) and pAF who presented to [facility] [place] 7/26 for acute onset abdominal pain that radiated to her back. States she has RUQ and epigastric pain that started acutely this afternoon. She rates her pain as 10/10 intensity, sharp and non-pleuritic in nature. She reports a past history of nephrolithiasis but states that this pain is different. Pain is not post-prandial in nature and explains that this is differen than her past episodes of nephrolithiasis. SOB started at around thesame time and she normally is on 2L/min of at home oxygen due to her COPD. She states that she was watching TV when the pain suddenly started. She denies nausea, vomiting, fever, chills, or LUTS including hamturia or dysuria. Recent admission 7/12-7/18 for pasturella bactermia d/t cat bite, completed 2wk CTX (EOT 7/26, midline removed 7/26). No recent changes in appetite reported. She also denies diarrhea, constipation.     In ED, Slightly hypertensive 172/83, no tachycardia, on 2L NC saturating 98%, afebrile. CBC with slightly worsened anemia (Hb 8.9, baseline seems approx 10), no leukocytosis. CMP with hypokalemia K 3.4, hypercalcemia (corrected 11.5), Alb 2.6, LFT elevation including T. Bili 1.1, AST//54, . Lipase >3000. , troponin normal. CTA (obtained for aortic dissection r/o) showed cholelithiasis Distended BG with pericholecystic fluid and cholelithiasis. No intrahepatic or extrahepatic biliary ductal dilatation. CTA showed cholelithiasis so we obtained a right upper  quadrant ultrasound as well that confirmed the cholelithiasis and could not necessarily rule out cholecystitis. Gave CTX and 1L IVF.     Overview/Hospital Course:  CTA Abdomen/pelvis (7/26/24) revealed non-obstruction renal calculi, cholelithiasis, possible early cholecystitis, no signs of choledocholithiasis, and stranding in the pancreas suggestive of pancreatits. MRCP on 7/28/24 showed cholelithiasis and pancreatitis without choledocolithiasis or cholecystitis. General surgery consulted and recommends a HIDA to definitively rule out Cholecystitis. If HIDA is negative, general surgery recommends an ERCP with sphincterotomy instead of a cholecystectomy due to co-morbidities increasing surgical risk. HIDA scan was negative for cholecystitis. Patient underwent an ERCP with sphincterotomy 7/30/24. Pain controlled, PT/OT with recommendations for moderate intensity therapy. SW aware. Pain continued to improve and on the morning of 8/2 the patient was no longer tender to palpation. She did however, have some pain that evening.     Interval History: Ms. Barajas has not complaints of pain today. She is resting comfortably in bed. Plan is discharge to SNF once placement is available. Some concern over increasing liver enzymes though patient is asymptomatic; possible DILI. Will continue to monitor and consider liver US.     Review of Systems   Constitutional:  Negative for chills, diaphoresis and fever.   Respiratory:  Positive for shortness of breath. Negative for cough.         Patient requires 2L oxygen at home   Cardiovascular:  Positive for leg swelling. Negative for chest pain and palpitations.   Gastrointestinal:  Negative for abdominal pain, constipation, diarrhea, nausea and vomiting.        Patient reports not abdominal pain today   Genitourinary:  Negative for difficulty urinating and dysuria.   Musculoskeletal:  Negative for arthralgias and myalgias.   Skin:  Negative for color change.   Neurological:  Negative  for dizziness and headaches.     Objective:     Vital Signs (Most Recent):  Temp: 97.9 °F (36.6 °C) (08/03/24 0820)  Pulse: 82 (08/03/24 0900)  Resp: 15 (08/03/24 0900)  BP: (!) 140/63 (08/03/24 0820)  SpO2: (!) 92 % (08/03/24 0900) Vital Signs (24h Range):  Temp:  [97.7 °F (36.5 °C)-98.4 °F (36.9 °C)] 97.9 °F (36.6 °C)  Pulse:  [75-85] 82  Resp:  [12-19] 15  SpO2:  [91 %-97 %] 92 %  BP: (140-173)/(63-84) 140/63     Weight: 77.1 kg (169 lb 15.6 oz)  Body mass index is 29.18 kg/m².    Intake/Output Summary (Last 24 hours) at 8/3/2024 1224  Last data filed at 8/3/2024 0335  Gross per 24 hour   Intake 120 ml   Output 600 ml   Net -480 ml         Physical Exam  Constitutional:       General: She is not in acute distress.     Appearance: Normal appearance. She is not ill-appearing.   HENT:      Head: Normocephalic and atraumatic.   Eyes:      General: No scleral icterus.     Conjunctiva/sclera: Conjunctivae normal.   Cardiovascular:      Rate and Rhythm: Normal rate and regular rhythm.      Pulses: Normal pulses.      Heart sounds: Normal heart sounds.   Pulmonary:      Effort: Pulmonary effort is normal.      Breath sounds: Normal breath sounds.   Abdominal:      General: Abdomen is flat. Bowel sounds are normal. There is no distension.      Palpations: Abdomen is soft.      Tenderness: There is no rebound.      Comments: Patient reported no tenderness this morning    Musculoskeletal:         General: Normal range of motion.      Cervical back: Normal range of motion and neck supple.      Right lower leg: Edema present.      Left lower leg: Edema present.      Comments: Pitting edema in lower extremities bilaterally    Skin:     General: Skin is warm and dry.      Capillary Refill: Capillary refill takes less than 2 seconds.      Coloration: Skin is not jaundiced.   Neurological:      Mental Status: She is alert and oriented to person, place, and time.   Psychiatric:         Mood and Affect: Mood normal.          Behavior: Behavior normal.             Significant Labs: All pertinent labs within the past 24 hours have been reviewed.    Significant Imaging: I have reviewed all pertinent imaging results/findings within the past 24 hours.    Assessment/Plan:      * Acute biliary pancreatitis  Sudden onset abdominal pain not a/w eating c/f gallstone pancreatitis (given cholelithiasis seen on CTA and US). No fever, chills, or other s/s infection to suggest cholangitis. Does have elevated T bili and AST/ALT. Severe abdominal pain. No nausea or vomiting. No acute alcohol intake. Slight hypercalcemia of unknown etiology, however not profound enough to explain acute pancreatitis. Lipase >3000. CT AP with peripancreatic stranding and edema, greatest about the head and neck, favored to be related to pancreatitis. Location of pain plus CT findings and lab findings do not suggest nephrolithiasis as etiology.     - AES consulted given LFT elevation, appreciate recommendations  - MRCP to better delineate biliary obstruction  - No antibiotics as low concern for cholangitis, would start Zosyn if febrile or becoming hypotensive  - cautious with fluids given CHF (no signs of decompensation, got 1L in ED)  - analgesic therapy  -General surgery recommends HIDA to definitively r/o cholecystitis  -If HIDA negative, General Surgery recommends ERCP w/ sphincterotomy due to co-morbidities increasing surgical risk   -Gastroenterology states cholecystectomy is best first line treatment, but an ERCP with sphincterectomy is a good second line treatment if cholecystectomy cannot be done    -HIDA was negative for cholecystitis;     ERCP with sphincterotomy performed by AES    Patient pain has greatly improved; no tenderness present; handling meals without issue    Liver enzymes increasing; no correlating symptoms; possible DILI; will monitor and workup further if continuing or if symptomatic.       COPD (chronic obstructive pulmonary disease)  Patient's  COPD is controlled currently.  Patient is currently off COPD Pathway. Continue scheduled inhalers Supplemental oxygen and monitor respiratory status closely.     No wheezing, no s/s respiratory illness. On home 2L NC.     - No Brezztri on formulary, started on breo    Chronic diastolic heart failure  No signs of decompensation. Stable RLE edema. No orthopnea, no rales.    - Continue home metoprolol  - Plans to resume lasix 8/2      Paroxysmal atrial fibrillation  Patient with Paroxysmal (<7 days) atrial fibrillation which is controlled currently with Beta Blocker. Patient is currently in sinus rhythm.[id number] Score: 3. Anticoagulation not indicated due to possible procedure .    CKD (chronic kidney disease) stage 3, GFR 30-59 ml/min  Creatine stable for now. BMP reviewed- noted Estimated Creatinine Clearance: 34.7 mL/min (based on SCr of 1.3 mg/dL). according to latest data. Based on current GFR, CKD stage is stage 3 - GFR 30-59.  Monitor UOP and serial BMP and adjust therapy as needed. Renally dose meds. Avoid nephrotoxic medications and procedures.    Plan:   - Strict I&Os and daily weights   - Trend Cr  - Strict I&Os  - Avoid nephrotoxic agents (NSAIDs, ACEi/Arbs, aminoglycoside abx, zozyn, amphotericin, tenofovir)  - Renally adjust medications (metformin, gabapentin, cefepime, morphine)    Secondary hyperparathyroidism of renal origin  Causing slight hypercalcemia.     - Check calcitriol   - CTM CMP      Essential hypertension  Chronic, uncontrolled. Latest blood pressure and vitals reviewed-     Temp:  [96.2 °F (35.7 °C)-98.6 °F (37 °C)]   Pulse:  [63-86]   Resp:  [12-22]   BP: (132-202)/(60-89)   SpO2:  [91 %-98 %] .   Home meds for hypertension were reviewed and noted below.   Hypertension Medications               furosemide (LASIX) 40 MG tablet Take 1 tablet (40 mg total) by mouth once daily.    metoprolol succinate (TOPROL-XL) 100 MG 24 hr tablet Take 1 tablet (100 mg total) by mouth once daily.             While in the hospital, will manage blood pressure as follows; Continue home antihypertensive regimen    Currently holding Lasix due to MARQUEZ    Will utilize p.r.n. blood pressure medication only if patient's blood pressure greater than 220/110 and she develops symptoms such as worsening chest pain or shortness of breath.      VTE Risk Mitigation (From admission, onward)           Ordered     apixaban tablet 5 mg  2 times daily         08/02/24 1910     Reason for No Pharmacological VTE Prophylaxis  Once        Comments: Anticipate possible procerdure   Question:  Reasons:  Answer:  Physician Provided (leave comment)    07/27/24 0430     IP VTE HIGH RISK PATIENT  Once         07/27/24 0430     Place sequential compression device  Until discontinued         07/27/24 0430                    Discharge Planning   NOHEMI: 8/5/2024     Code Status: Full Code   Is the patient medically ready for discharge?:     Reason for patient still in hospital (select all that apply): Patient trending condition and Pending disposition  Discharge Plan A: Home with family, Home Health                  Karey Liang MD  Department of Hospital Medicine   [facility] ([address])

## 2024-08-03 NOTE — ASSESSMENT & PLAN NOTE
Sudden onset abdominal pain not a/w eating c/f gallstone pancreatitis (given cholelithiasis seen on CTA and US). No fever, chills, or other s/s infection to suggest cholangitis. Does have elevated T bili and AST/ALT. Severe abdominal pain. No nausea or vomiting. No acute alcohol intake. Slight hypercalcemia of unknown etiology, however not profound enough to explain acute pancreatitis. Lipase >3000. CT AP with peripancreatic stranding and edema, greatest about the head and neck, favored to be related to pancreatitis. Location of pain plus CT findings and lab findings do not suggest nephrolithiasis as etiology.     - AES consulted given LFT elevation, appreciate recommendations  - MRCP to better delineate biliary obstruction  - No antibiotics as low concern for cholangitis, would start Zosyn if febrile or becoming hypotensive  - cautious with fluids given CHF (no signs of decompensation, got 1L in ED)  - analgesic therapy  -General surgery recommends HIDA to definitively r/o cholecystitis  -If HIDA negative, General Surgery recommends ERCP w/ sphincterotomy due to co-morbidities increasing surgical risk   -Gastroenterology states cholecystectomy is best first line treatment, but an ERCP with sphincterectomy is a good second line treatment if cholecystectomy cannot be done    -HIDA was negative for cholecystitis;     ERCP with sphincterotomy performed by AES    Patient pain has greatly improved; no tenderness present; handling meals without issue    Liver enzymes increasing; no correlating symptoms; possible DILI; will monitor and workup further if continuing or if symptomatic.

## 2024-08-03 NOTE — SUBJECTIVE & OBJECTIVE
Interval History: Ms. Barajas has not complaints of pain today. She is resting comfortably in bed. Plan is discharge to SNF once placement is available. Some concern over increasing liver enzymes though patient is asymptomatic; possible DILI. Will continue to monitor and consider liver US.     Review of Systems   Constitutional:  Negative for chills, diaphoresis and fever.   Respiratory:  Positive for shortness of breath. Negative for cough.         Patient requires 2L oxygen at home   Cardiovascular:  Positive for leg swelling. Negative for chest pain and palpitations.   Gastrointestinal:  Negative for abdominal pain, constipation, diarrhea, nausea and vomiting.        Patient reports not abdominal pain today   Genitourinary:  Negative for difficulty urinating and dysuria.   Musculoskeletal:  Negative for arthralgias and myalgias.   Skin:  Negative for color change.   Neurological:  Negative for dizziness and headaches.     Objective:     Vital Signs (Most Recent):  Temp: 97.9 °F (36.6 °C) (08/03/24 0820)  Pulse: 82 (08/03/24 0900)  Resp: 15 (08/03/24 0900)  BP: (!) 140/63 (08/03/24 0820)  SpO2: (!) 92 % (08/03/24 0900) Vital Signs (24h Range):  Temp:  [97.7 °F (36.5 °C)-98.4 °F (36.9 °C)] 97.9 °F (36.6 °C)  Pulse:  [75-85] 82  Resp:  [12-19] 15  SpO2:  [91 %-97 %] 92 %  BP: (140-173)/(63-84) 140/63     Weight: 77.1 kg (169 lb 15.6 oz)  Body mass index is 29.18 kg/m².    Intake/Output Summary (Last 24 hours) at 8/3/2024 1224  Last data filed at 8/3/2024 0335  Gross per 24 hour   Intake 120 ml   Output 600 ml   Net -480 ml         Physical Exam  Constitutional:       General: She is not in acute distress.     Appearance: Normal appearance. She is not ill-appearing.   HENT:      Head: Normocephalic and atraumatic.   Eyes:      General: No scleral icterus.     Conjunctiva/sclera: Conjunctivae normal.   Cardiovascular:      Rate and Rhythm: Normal rate and regular rhythm.      Pulses: Normal pulses.      Heart sounds:  Normal heart sounds.   Pulmonary:      Effort: Pulmonary effort is normal.      Breath sounds: Normal breath sounds.   Abdominal:      General: Abdomen is flat. Bowel sounds are normal. There is no distension.      Palpations: Abdomen is soft.      Tenderness: There is no rebound.      Comments: Patient reported no tenderness this morning    Musculoskeletal:         General: Normal range of motion.      Cervical back: Normal range of motion and neck supple.      Right lower leg: Edema present.      Left lower leg: Edema present.      Comments: Pitting edema in lower extremities bilaterally    Skin:     General: Skin is warm and dry.      Capillary Refill: Capillary refill takes less than 2 seconds.      Coloration: Skin is not jaundiced.   Neurological:      Mental Status: She is alert and oriented to person, place, and time.   Psychiatric:         Mood and Affect: Mood normal.         Behavior: Behavior normal.             Significant Labs: All pertinent labs within the past 24 hours have been reviewed.    Significant Imaging: I have reviewed all pertinent imaging results/findings within the past 24 hours.

## 2024-08-04 LAB
ALBUMIN SERPL BCP-MCNC: 1.6 G/DL (ref 3.5–5.2)
ALP SERPL-CCNC: 310 U/L (ref 55–135)
ALT SERPL W/O P-5'-P-CCNC: 67 U/L (ref 10–44)
ANION GAP SERPL CALC-SCNC: 9 MMOL/L (ref 8–16)
AST SERPL-CCNC: 99 U/L (ref 10–40)
BASOPHILS # BLD AUTO: 0.12 K/UL (ref 0–0.2)
BASOPHILS NFR BLD: 0.9 % (ref 0–1.9)
BILIRUB SERPL-MCNC: 1.2 MG/DL (ref 0.1–1)
BUN SERPL-MCNC: 20 MG/DL (ref 8–23)
CALCIUM SERPL-MCNC: 9.9 MG/DL (ref 8.7–10.5)
CHLORIDE SERPL-SCNC: 104 MMOL/L (ref 95–110)
CO2 SERPL-SCNC: 28 MMOL/L (ref 23–29)
CREAT SERPL-MCNC: 1 MG/DL (ref 0.5–1.4)
DIFFERENTIAL METHOD BLD: ABNORMAL
EOSINOPHIL # BLD AUTO: 0.5 K/UL (ref 0–0.5)
EOSINOPHIL NFR BLD: 3.6 % (ref 0–8)
ERYTHROCYTE [DISTWIDTH] IN BLOOD BY AUTOMATED COUNT: 14.6 % (ref 11.5–14.5)
EST. GFR  (NO RACE VARIABLE): 57 ML/MIN/1.73 M^2
GLUCOSE SERPL-MCNC: 114 MG/DL (ref 70–110)
HCT VFR BLD AUTO: 35.1 % (ref 37–48.5)
HGB BLD-MCNC: 11.1 G/DL (ref 12–16)
IMM GRANULOCYTES # BLD AUTO: 0.4 K/UL (ref 0–0.04)
IMM GRANULOCYTES NFR BLD AUTO: 2.9 % (ref 0–0.5)
LYMPHOCYTES # BLD AUTO: 2.2 K/UL (ref 1–4.8)
LYMPHOCYTES NFR BLD: 15.9 % (ref 18–48)
MAGNESIUM SERPL-MCNC: 1.9 MG/DL (ref 1.6–2.6)
MCH RBC QN AUTO: 28.5 PG (ref 27–31)
MCHC RBC AUTO-ENTMCNC: 31.6 G/DL (ref 32–36)
MCV RBC AUTO: 90 FL (ref 82–98)
MONOCYTES # BLD AUTO: 1.5 K/UL (ref 0.3–1)
MONOCYTES NFR BLD: 10.4 % (ref 4–15)
NEUTROPHILS # BLD AUTO: 9.2 K/UL (ref 1.8–7.7)
NEUTROPHILS NFR BLD: 66.3 % (ref 38–73)
NRBC BLD-RTO: 0 /100 WBC
OHS QRS DURATION: 156 MS
OHS QTC CALCULATION: 490 MS
PLATELET # BLD AUTO: 275 K/UL (ref 150–450)
PMV BLD AUTO: 9.7 FL (ref 9.2–12.9)
POTASSIUM SERPL-SCNC: 3.4 MMOL/L (ref 3.5–5.1)
PROT SERPL-MCNC: 5.4 G/DL (ref 6–8.4)
RBC # BLD AUTO: 3.89 M/UL (ref 4–5.4)
SODIUM SERPL-SCNC: 141 MMOL/L (ref 136–145)
WBC # BLD AUTO: 13.88 K/UL (ref 3.9–12.7)

## 2024-08-04 PROCEDURE — 97110 THERAPEUTIC EXERCISES: CPT

## 2024-08-04 PROCEDURE — 25000003 PHARM REV CODE 250

## 2024-08-04 PROCEDURE — 97530 THERAPEUTIC ACTIVITIES: CPT

## 2024-08-04 PROCEDURE — 93010 ELECTROCARDIOGRAM REPORT: CPT | Mod: ,,, | Performed by: INTERNAL MEDICINE

## 2024-08-04 PROCEDURE — 93005 ELECTROCARDIOGRAM TRACING: CPT

## 2024-08-04 PROCEDURE — 85025 COMPLETE CBC W/AUTO DIFF WBC: CPT

## 2024-08-04 PROCEDURE — 97161 PT EVAL LOW COMPLEX 20 MIN: CPT

## 2024-08-04 PROCEDURE — 94640 AIRWAY INHALATION TREATMENT: CPT

## 2024-08-04 PROCEDURE — 25000003 PHARM REV CODE 250: Performed by: STUDENT IN AN ORGANIZED HEALTH CARE EDUCATION/TRAINING PROGRAM

## 2024-08-04 PROCEDURE — 21400001 HC TELEMETRY ROOM

## 2024-08-04 PROCEDURE — 94761 N-INVAS EAR/PLS OXIMETRY MLT: CPT

## 2024-08-04 PROCEDURE — 99900035 HC TECH TIME PER 15 MIN (STAT)

## 2024-08-04 PROCEDURE — 83735 ASSAY OF MAGNESIUM: CPT

## 2024-08-04 PROCEDURE — 80053 COMPREHEN METABOLIC PANEL: CPT

## 2024-08-04 PROCEDURE — 27000221 HC OXYGEN, UP TO 24 HOURS

## 2024-08-04 RX ADMIN — METOPROLOL SUCCINATE 100 MG: 50 TABLET, EXTENDED RELEASE ORAL at 08:08

## 2024-08-04 RX ADMIN — APIXABAN 5 MG: 5 TABLET, FILM COATED ORAL at 08:08

## 2024-08-04 RX ADMIN — FLUTICASONE FUROATE AND VILANTEROL TRIFENATATE 1 PUFF: 100; 25 POWDER RESPIRATORY (INHALATION) at 11:08

## 2024-08-04 RX ADMIN — POTASSIUM BICARBONATE 40 MEQ: 391 TABLET, EFFERVESCENT ORAL at 12:08

## 2024-08-04 RX ADMIN — OXYCODONE HYDROCHLORIDE 5 MG: 5 TABLET ORAL at 08:08

## 2024-08-04 RX ADMIN — SENNOSIDES 8.6 MG: 8.6 TABLET, FILM COATED ORAL at 08:08

## 2024-08-04 NOTE — PT/OT/SLP PROGRESS
Occupational Therapy   Treatment    Name: Misa Barajas  MRN: [id number]  Admitting Diagnosis:  Acute biliary pancreatitis  5 Days Post-Op    Recommendations:     Discharge Recommendations: Moderate Intensity Therapy  Discharge Equipment Recommendations:  grab bar, walker, rolling  Barriers to discharge:   (increased (A) required)    Assessment:     Misa Barajas is a 78 y.o. female with a medical diagnosis of Acute biliary pancreatitis.  She presents with good participation and motivation.  Pt reported session was easier with being pre-medicated. Pt continues to be at risk for falls. Performance deficits affecting function are weakness, impaired endurance, impaired self care skills, impaired functional mobility, impaired balance, decreased safety awareness, decreased lower extremity function, decreased upper extremity function.     Rehab Prognosis:  Fair; patient would benefit from acute skilled OT services to address these deficits and reach maximum level of function.       Plan:     Patient to be seen 4 x/week to address the above listed problems via self-care/home management, therapeutic activities, therapeutic exercises, neuromuscular re-education  Plan of Care Expires: 08/28/24  Plan of Care Reviewed with: patient    Subjective     Chief Complaint: being tired after sitting in chair for ~ 2 hours.  Patient/Family Comments/goals: Pt reported that it helped to be pre-medicated.  Pain/Comfort:  Pain Rating 1: 0/10  Pain Rating Post-Intervention 1: 0/10    Objective:     Communicated with: RN prior to session.  Patient found standing with RN assisting with transfer to bed with oxygen, [employer]ick (family present during session) upon OT entry to room.    General Precautions: Standard, fall    Orthopedic Precautions:N/A  Braces: N/A     Occupational Performance:     Bed Mobility:    Patient completed Scooting/Bridging with Min-Mod (A) with scooting to the left along EOB  Patient completed Sit to Supine with minimum  assistance     Functional Mobility/Transfers:  Mod (A) x 2 with stand pivot transfer using RW with steps included from chair to EOB with (A) for balance & for management & sequencing of steps & walker      [facility] 6 Click ADL: 16    Treatment & Education:  Pt performed AROM exercises with BUE in 4 planes x 10 reps each.  Pt required SBA with postural control while seated EOB.  Provided education on safety & importance of participating in therapy & OOB activities with staff. Pt & family reported weakness with dexterity of hands therefore will provide theraputty for pt to address pinch .  Pt had no further questions & when asked whether there were any concerns pt reported none.      Patient left supine with all lines intact, call button in reach, bed alarm on, RN notified, and family present    GOALS:   Multidisciplinary Problems       Occupational Therapy Goals          Problem: Occupational Therapy    Goal Priority Disciplines Outcome Interventions   Occupational Therapy Goal     OT, PT/OT Progressing    Description: Goals to be met by: 8/28     Patient will increase functional independence with ADLs by performing:    UE Dressing with Stand-by Assistance.  LE Dressing with Stand-by Assistance.  Grooming while standing at sink with Contact Guard Assistance.  Sit>stand with RW from EOB with SBA  Supine to sit with SBA                       Time Tracking:     OT Date of Treatment: 08/04/24  OT Start Time: 1102  OT Stop Time: 1116  OT Total Time (min): 14 min    Billable Minutes:Therapeutic Exercise 14    OT/MARYA: OT          8/4/2024

## 2024-08-04 NOTE — PLAN OF CARE
Problem: Occupational Therapy  Goal: Occupational Therapy Goal  Description: Goals to be met by: 8/28     Patient will increase functional independence with ADLs by performing:    UE Dressing with Stand-by Assistance.  LE Dressing with Stand-by Assistance.  Grooming while standing at sink with Contact Guard Assistance.  Sit>stand with RW from EOB with SBA  Supine to sit with SBA  Outcome: Progressing     Goals updated.

## 2024-08-04 NOTE — PLAN OF CARE
Problem: Adult Inpatient Plan of Care  Goal: Plan of Care Review  Flowsheets (Taken 8/4/2024 1852)  Plan of Care Reviewed With: patient     Problem: Acute Kidney Injury/Impairment  Goal: Fluid and Electrolyte Balance  Intervention: Monitor and Manage Fluid and Electrolyte Balance  Flowsheets (Taken 8/4/2024 0841)  Fluid/Electrolyte Management: electrolyte supplement initiated  Goal: Improved Oral Intake  Intervention: Promote and Optimize Oral Intake  Flowsheets (Taken 8/4/2024 0841)  Oral Nutrition Promotion: adaptive equipment use encouraged  Nutrition Interventions: food preferences provided     Problem: Wound  Goal: Optimal Coping  Intervention: Support Patient and Family Response  Flowsheets (Taken 8/4/2024 0841)  Supportive Measures: active listening utilized  Goal: Improved Oral Intake  Intervention: Promote and Optimize Oral Intake  Flowsheets (Taken 8/4/2024 0841)  Oral Nutrition Promotion: adaptive equipment use encouraged  Nutrition Interventions: food preferences provided  Goal: Optimal Pain Control and Function  Intervention: Prevent or Manage Pain  Flowsheets (Taken 8/4/2024 0841)  Sleep/Rest Enhancement: consistent schedule promoted  Pain Management Interventions:   pain management plan reviewed with patient/caregiver   medication offered  Goal: Optimal Wound Healing  Intervention: Promote Wound Healing  Flowsheets (Taken 8/4/2024 0841)  Sleep/Rest Enhancement: consistent schedule promoted     Problem: Skin Injury Risk Increased  Goal: Skin Health and Integrity  Intervention: Promote and Optimize Oral Intake  Flowsheets (Taken 8/4/2024 0841)  Oral Nutrition Promotion: adaptive equipment use encouraged  Nutrition Interventions: food preferences provided     Problem: Pain Acute  Goal: Optimal Pain Control and Function  Intervention: Develop Pain Management Plan  Flowsheets (Taken 8/4/2024 0841)  Pain Management Interventions:   pain management plan reviewed with patient/caregiver   medication  offered  Intervention: Prevent or Manage Pain  Flowsheets (Taken 8/4/2024 0841)  Sleep/Rest Enhancement: consistent schedule promoted  Intervention: Optimize Psychosocial Wellbeing  Flowsheets (Taken 8/4/2024 0841)  Supportive Measures: active listening utilized

## 2024-08-04 NOTE — SUBJECTIVE & OBJECTIVE
Interval History: Patient was sitting up in bed eating and reports no n/v or pain with meals. She does have some mild tenderness to palpation in the RUQ and RLQ. Liver enzymes improving.     Review of Systems   Constitutional:  Negative for chills, diaphoresis and fever.   Respiratory:  Positive for shortness of breath. Negative for cough.         Patient requires 2L oxygen at home   Cardiovascular:  Positive for leg swelling. Negative for chest pain and palpitations.   Gastrointestinal:  Negative for abdominal pain, constipation, diarrhea, nausea and vomiting.        Patient reports not abdominal pain today   Genitourinary:  Negative for difficulty urinating and dysuria.   Musculoskeletal:  Negative for arthralgias and myalgias.   Skin:  Negative for color change.   Neurological:  Negative for dizziness and headaches.     Objective:     Vital Signs (Most Recent):  Temp: 97.8 °F (36.6 °C) (08/04/24 0738)  Pulse: 72 (08/04/24 0916)  Resp: 17 (08/04/24 0841)  BP: (!) 162/70 (08/04/24 0738)  SpO2: (!) 94 % (08/04/24 0738) Vital Signs (24h Range):  Temp:  [97.2 °F (36.2 °C)-98 °F (36.7 °C)] 97.8 °F (36.6 °C)  Pulse:  [36-97] 72  Resp:  [16-20] 17  SpO2:  [90 %-94 %] 94 %  BP: (133-171)/(62-95) 162/70     Weight: 77.1 kg (169 lb 15.6 oz)  Body mass index is 29.18 kg/m².    Intake/Output Summary (Last 24 hours) at 8/4/2024 1050  Last data filed at 8/4/2024 0430  Gross per 24 hour   Intake 360 ml   Output 600 ml   Net -240 ml         Physical Exam  Constitutional:       General: She is not in acute distress.     Appearance: Normal appearance. She is not ill-appearing.   HENT:      Head: Normocephalic and atraumatic.   Eyes:      General: No scleral icterus.     Conjunctiva/sclera: Conjunctivae normal.   Cardiovascular:      Rate and Rhythm: Normal rate and regular rhythm.      Pulses: Normal pulses.      Heart sounds: Normal heart sounds.      Comments: Some bradycardia overnight; asymptomatic   Pulmonary:      Effort:  Pulmonary effort is normal.      Breath sounds: Normal breath sounds.   Abdominal:      General: Abdomen is flat. Bowel sounds are normal. There is no distension.      Palpations: Abdomen is soft.      Tenderness: There is abdominal tenderness. There is no rebound.      Comments: Mild tenderness to palpation in RQU and RLQ   Musculoskeletal:         General: Normal range of motion.      Cervical back: Normal range of motion and neck supple.      Right lower leg: Edema present.      Left lower leg: Edema present.      Comments: Pitting edema in lower extremities bilaterally    Skin:     General: Skin is warm and dry.      Coloration: Skin is not jaundiced.   Neurological:      Mental Status: She is alert and oriented to person, place, and time.   Psychiatric:         Mood and Affect: Mood normal.         Behavior: Behavior normal.             Significant Labs: All pertinent labs within the past 24 hours have been reviewed.  CMP:   Recent Labs   Lab 08/03/24  0528 08/04/24  0616    141   K 3.5 3.4*    104   CO2 28 28   GLU 85 114*   BUN 22 20   CREATININE 1.0 1.0   CALCIUM 9.6 9.9   PROT 5.1* 5.4*   ALBUMIN 1.5* 1.6*   BILITOT 1.6* 1.2*   ALKPHOS 328* 310*   * 99*   ALT 86* 67*   ANIONGAP 7* 9       Significant Imaging: I have reviewed all pertinent imaging results/findings within the past 24 hours.

## 2024-08-04 NOTE — PLAN OF CARE
Problem: Physical Therapy  Goal: Physical Therapy Goal  Description: Goals to be met by:      Patient will increase functional independence with mobility by performin. Supine to sit with Modified Wetzel  2. Sit to supine with Modified Wetzel  3. Sit to stand transfer with Stand-by Assistance  4. Bed to chair transfer with Stand-by Assistance using LRAD  5. Gait  x 100 feet with Stand-by Assistance using LRAD.     Outcome: Progressing   Evaluation completed, initiated plan of care.   Bibi Sharp, PT  2024

## 2024-08-04 NOTE — ASSESSMENT & PLAN NOTE
No signs of decompensation. Stable RLE edema. No orthopnea, no rales.    - Continue home metoprolol

## 2024-08-04 NOTE — PROGRESS NOTES
[facility] ([address] Medicine  Progress Note    Patient Name: Misa Barajas  MRN: [id number]  Patient Class: IP- Inpatient   Admission Date: 7/26/2024  Length of Stay: 8 days  Attending Physician: Jamey Raya, *  Primary Care Provider: Celia Carlisle MD        Subjective:     Principal Problem:Acute biliary pancreatitis        HPI:  Misa Barajas is a 78F with a PMHx of HTN, HLD, HFpEF, COPD (on 2L NC baseline O2 sat 92%) and pAF who [place] [facility] [place] for acute onset abdominal pain that radiated to her back. States she has RUQ and epigastric pain that started acutely this afternoon. She rates her pain as 10/10 intensity, sharp and non-pleuritic in nature. She reports a past history of nephrolithiasis but states that this pain is different. Pain is not post-prandial in nature and explains that this is differen than her past episodes of nephrolithiasis. SOB started at around thesame time and she normally is on 2L/min of at home oxygen due to her COPD. She states that she was watching TV when the pain suddenly started. She denies nausea, vomiting, fever, chills, or LUTS including hamturia or dysuria. Recent admission 7/12-7/18 for pasturella bactermia d/t cat bite, completed 2wk CTX (EOT 7/26, midline removed 7/26). No recent changes in appetite reported. She also denies diarrhea, constipation.     In ED, Slightly hypertensive 172/83, no tachycardia, on 2L NC saturating 98%, afebrile. CBC with slightly worsened anemia (Hb 8.9, baseline seems approx 10), no leukocytosis. CMP with hypokalemia K 3.4, hypercalcemia (corrected 11.5), Alb 2.6, LFT elevation including T. Bili 1.1, AST//54, . Lipase >3000. , troponin normal. CTA (obtained for aortic dissection r/o) showed cholelithiasis Distended BG with pericholecystic fluid and cholelithiasis. No intrahepatic or extrahepatic biliary ductal dilatation. CTA showed cholelithiasis so we obtained a right upper  quadrant ultrasound as well that confirmed the cholelithiasis and could not necessarily rule out cholecystitis. Gave CTX and 1L IVF.     Overview/Hospital Course:  CTA Abdomen/pelvis (7/26/24) revealed non-obstruction renal calculi, cholelithiasis, possible early cholecystitis, no signs of choledocholithiasis, and stranding in the pancreas suggestive of pancreatits. MRCP on 7/28/24 showed cholelithiasis and pancreatitis without choledocolithiasis or cholecystitis. General surgery consulted and recommends a HIDA to definitively rule out Cholecystitis. If HIDA is negative, general surgery recommends an ERCP with sphincterotomy instead of a cholecystectomy due to co-morbidities increasing surgical risk. HIDA scan was negative for cholecystitis. Patient underwent an ERCP with sphincterotomy 7/30/24. Pain controlled, PT/OT with recommendations for moderate intensity therapy. SW aware. Pain has greatly improved and there is only mild tenderness to palpation in RUQ.     Interval History: Patient was sitting up in bed eating and reports no n/v or pain with meals. She does have some mild tenderness to palpation in the RUQ and RLQ. Liver enzymes improving.     Review of Systems   Constitutional:  Negative for chills, diaphoresis and fever.   Respiratory:  Positive for shortness of breath. Negative for cough.         Patient requires 2L oxygen at home   Cardiovascular:  Positive for leg swelling. Negative for chest pain and palpitations.   Gastrointestinal:  Negative for abdominal pain, constipation, diarrhea, nausea and vomiting.        Patient reports not abdominal pain today   Genitourinary:  Negative for difficulty urinating and dysuria.   Musculoskeletal:  Negative for arthralgias and myalgias.   Skin:  Negative for color change.   Neurological:  Negative for dizziness and headaches.     Objective:     Vital Signs (Most Recent):  Temp: 97.8 °F (36.6 °C) (08/04/24 0738)  Pulse: 72 (08/04/24 0916)  Resp: 17 (08/04/24  0841)  BP: (!) 162/70 (08/04/24 0738)  SpO2: (!) 94 % (08/04/24 0738) Vital Signs (24h Range):  Temp:  [97.2 °F (36.2 °C)-98 °F (36.7 °C)] 97.8 °F (36.6 °C)  Pulse:  [36-97] 72  Resp:  [16-20] 17  SpO2:  [90 %-94 %] 94 %  BP: (133-171)/(62-95) 162/70     Weight: 77.1 kg (169 lb 15.6 oz)  Body mass index is 29.18 kg/m².    Intake/Output Summary (Last 24 hours) at 8/4/2024 1050  Last data filed at 8/4/2024 0430  Gross per 24 hour   Intake 360 ml   Output 600 ml   Net -240 ml         Physical Exam  Constitutional:       General: She is not in acute distress.     Appearance: Normal appearance. She is not ill-appearing.   HENT:      Head: Normocephalic and atraumatic.   Eyes:      General: No scleral icterus.     Conjunctiva/sclera: Conjunctivae normal.   Cardiovascular:      Rate and Rhythm: Normal rate and regular rhythm.      Pulses: Normal pulses.      Heart sounds: Normal heart sounds.      Comments: Some bradycardia overnight; asymptomatic   Pulmonary:      Effort: Pulmonary effort is normal.      Breath sounds: Normal breath sounds.   Abdominal:      General: Abdomen is flat. Bowel sounds are normal. There is no distension.      Palpations: Abdomen is soft.      Tenderness: There is abdominal tenderness. There is no rebound.      Comments: Mild tenderness to palpation in RQU and RLQ   Musculoskeletal:         General: Normal range of motion.      Cervical back: Normal range of motion and neck supple.      Right lower leg: Edema present.      Left lower leg: Edema present.      Comments: Pitting edema in lower extremities bilaterally    Skin:     General: Skin is warm and dry.      Coloration: Skin is not jaundiced.   Neurological:      Mental Status: She is alert and oriented to person, place, and time.   Psychiatric:         Mood and Affect: Mood normal.         Behavior: Behavior normal.             Significant Labs: All pertinent labs within the past 24 hours have been reviewed.  CMP:   Recent Labs   Lab  08/03/24  0528 08/04/24  0616    141   K 3.5 3.4*    104   CO2 28 28   GLU 85 114*   BUN 22 20   CREATININE 1.0 1.0   CALCIUM 9.6 9.9   PROT 5.1* 5.4*   ALBUMIN 1.5* 1.6*   BILITOT 1.6* 1.2*   ALKPHOS 328* 310*   * 99*   ALT 86* 67*   ANIONGAP 7* 9       Significant Imaging: I have reviewed all pertinent imaging results/findings within the past 24 hours.    Assessment/Plan:      * Acute biliary pancreatitis  Sudden onset abdominal pain not a/w eating c/f gallstone pancreatitis (given cholelithiasis seen on CTA and US). No fever, chills, or other s/s infection to suggest cholangitis. Does have elevated T bili and AST/ALT. Severe abdominal pain. No nausea or vomiting. No acute alcohol intake. Slight hypercalcemia of unknown etiology, however not profound enough to explain acute pancreatitis. Lipase >3000. CT AP with peripancreatic stranding and edema, greatest about the head and neck, favored to be related to pancreatitis. Location of pain plus CT findings and lab findings do not suggest nephrolithiasis as etiology.     - AES consulted given LFT elevation, appreciate recommendations  - MRCP to better delineate biliary obstruction  - cautious with fluids given CHF (no signs of decompensation, got 1L in ED)  - analgesic therapy  -General surgery recommends HIDA to definitively r/o cholecystitis  -If HIDA negative, General Surgery recommends ERCP w/ sphincterotomy due to co-morbidities increasing surgical risk     -HIDA was negative for cholecystitis;     ERCP with sphincterotomy performed by AES; patient given zosyn after procedure for 3 days    Patient pain has greatly improved; only mild tenderness present; handling meals without issue    Liver enzymes decreasing     Patient will discharge to SNF      COPD (chronic obstructive pulmonary disease)  Patient's COPD is controlled currently.  Patient is currently off COPD Pathway. Continue scheduled inhalers Supplemental oxygen and monitor respiratory  status closely.     No wheezing, no s/s respiratory illness. On home 2L NC.     - No Brezztri on formulary, started on breo    Chronic diastolic heart failure  No signs of decompensation. Stable RLE edema. No orthopnea, no rales.    - Continue home metoprolol        Paroxysmal atrial fibrillation  Patient with Paroxysmal (<7 days) atrial fibrillation which is controlled currently with Beta Blocker. Patient is currently in sinus rhythm.[id number] Score: 3. Anticoagulation not indicated due to possible procedure .    CKD (chronic kidney disease) stage 3, GFR 30-59 ml/min  Creatine stable for now. BMP reviewed- noted Estimated Creatinine Clearance: 34.7 mL/min (based on SCr of 1.3 mg/dL). according to latest data. Based on current GFR, CKD stage is stage 3 - GFR 30-59.  Monitor UOP and serial BMP and adjust therapy as needed. Renally dose meds. Avoid nephrotoxic medications and procedures.    Plan:   - Strict I&Os and daily weights   - Trend Cr  - Strict I&Os  - Avoid nephrotoxic agents (NSAIDs, ACEi/Arbs, aminoglycoside abx, zozyn, amphotericin, tenofovir)  - Renally adjust medications (metformin, gabapentin, cefepime, morphine)    Secondary hyperparathyroidism of renal origin  Causing slight hypercalcemia.     - Check calcitriol   - CTM CMP      Essential hypertension  Chronic, uncontrolled. Latest blood pressure and vitals reviewed-     Temp:  [96.2 °F (35.7 °C)-98.6 °F (37 °C)]   Pulse:  [63-86]   Resp:  [12-22]   BP: (132-202)/(60-89)   SpO2:  [91 %-98 %] .   Home meds for hypertension were reviewed and noted below.   Hypertension Medications               furosemide (LASIX) 40 MG tablet Take 1 tablet (40 mg total) by mouth once daily.    metoprolol succinate (TOPROL-XL) 100 MG 24 hr tablet Take 1 tablet (100 mg total) by mouth once daily.            While in the hospital, will manage blood pressure as follows; Continue home antihypertensive regimen    Currently holding Lasix due to MARQUEZ    Will utilize p.r.n.  blood pressure medication only if patient's blood pressure greater than 220/110 and she develops symptoms such as worsening chest pain or shortness of breath.      VTE Risk Mitigation (From admission, onward)           Ordered     apixaban tablet 5 mg  2 times daily         08/02/24 1910     Reason for No Pharmacological VTE Prophylaxis  Once        Comments: Anticipate possible procerdure   Question:  Reasons:  Answer:  Physician Provided (leave comment)    07/27/24 0430     IP VTE HIGH RISK PATIENT  Once         07/27/24 0430     Place sequential compression device  Until discontinued         07/27/24 0430                    Discharge Planning   NOHEMI: 8/5/2024     Code Status: Full Code   Is the patient medically ready for discharge?:     Reason for patient still in hospital (select all that apply): Pending disposition  Discharge Plan A: Home with family, Home Health                  Karey Liang MD  Department of Hospital Medicine   [facility] ([place])

## 2024-08-04 NOTE — PT/OT/SLP EVAL
Physical Therapy Evaluation    Patient Name:  Misa Barajas   MRN:  [id number]    Recommendations:     Discharge Recommendations: Moderate Intensity Therapy   Discharge Equipment Recommendations: walker, rolling, grab bar   Barriers to discharge: Inaccessible home and Decreased caregiver support    The patient is safe and appropriate to mobilize with RN staff outside of therapy sessions: The patient is safe to ambulate with RN using RW, RW left at bedside.       Assessment:     Misa Barajas is a 78 y.o. female admitted with a medical diagnosis of Acute biliary pancreatitis.  She presents with the following impairments/functional limitations: weakness, impaired endurance, impaired self care skills, impaired functional mobility, gait instability, decreased safety awareness, pain, decreased lower extremity function, decreased coordination. The patient was pre-medicated for pain prior to session, the patient was willing to participate with mobility. She demonstrates generalized weakness and deconditioning. She stood with minimum assistance with RW from EOB, patient stood for ~2 min before reporting SOB on 2L O2, O2 dropped to 90%. O2 increased to 4L, cued for pursed lip breathing, O2 sats increased to 94%. She had difficulty managing RW during step transfer from bed to chair, patient moving hand off of RW to hold bed during transfer. Prior to admission, the patient was modified independent with gait using rollator, lives alone. Patient currently demonstrates a need for moderate intensity therapy on a daily basis post acute secondary to a decline in functional status due to illness.     Rehab Prognosis: Good; patient would benefit from acute skilled PT services to address these deficits and reach maximum level of function.    Recent Surgery: Procedure(s) (LRB):  ERCP (ENDOSCOPIC RETROGRADE CHOLANGIOPANCREATOGRAPHY) (N/A) 5 Days Post-Op    Plan:     During this hospitalization, patient to be seen 4 x/week to address  the identified rehab impairments via gait training, therapeutic activities, therapeutic exercises, neuromuscular re-education and progress toward the following goals:    Plan of Care Expires:  09/03/24    Subjective     Chief Complaint: shortness of breath with activity  Patient/Family Comments/goals: return to PLOF  Pain/Comfort:  Pain Rating 1: 0/10  Pain Addressed 1: Pre-medicate for activity    Patients cultural, spiritual, Hoahaoism conflicts given the current situation: no    Living Environment:  The patient lives alone in a [facility], [facility] to [facility].   Prior to admission, patients level of function was modified independent.  Equipment used at home: oxygen, rollator, shower chair, bedside commode.  DME owned (not currently used): none.  Upon discharge, patient will have assistance from family.    Objective:     Communicated with Rn prior to session.  Patient found HOB elevated with oxygen, PureWick  upon PT entry to room.    General Precautions: Standard, fall  Orthopedic Precautions:N/A   Braces: N/A  Respiratory Status: Nasal cannula, flow 2 L/min    Exams:    Cognitive Exam  Patient is A&O x4 and follows 100% of one -step commands    Fine Motor Coordination   -       WNL     Postural Exam Patient presented with the following abnormalities:    -       Rounded shoulders  -       Forward head  -       Kyphosis  -       Posterior pelvic tilt  -       Weight shift posterior   Sensation    -       Light touch intact LE   Skin Integrity/Edema     -       Skin integrity: visibly intact   -       Edema: mild lower legs R>L foot   R LE ROM WNL   R LE Strength 3/5 hip flexion, 4-/5 knee ext/flex, and ankle DF/PF   L LE ROM WNL   L LE Strength  3/5 hip flexion,4-/5 knee ext/flex, and ankle DF/PF          Functional Mobility:    Bed Mobility  Supine to Sit on the L side:  moderate assistance    Transfers Sit to Stand:  minimum assistance from EOB 2 reps with RW   Gait  Gait Distance: steps to chair with  Rw  Assistance Level: minimum assistance   Description: kyphotic posture, short shuffling steps, difficulty progressing RW, impaired weight shift, decreaed step length          AM-PAC 6 CLICK MOBILITY  Total Score:16       Treatment & Education:  Patient educated on:  -role of therapy  -goals of session  -PT POC  -benefits of out of bed mobility and consequences of immobility  -calling for staff assist to mobilize safely  Patient agreeable to mobilize with therapy.      Gait training: cued for upright posture, reciprocal strides, cued to ambulate inside RW VITOR, pacing for energy conservation     Patient encouraged to ambulate, sit up in chair 3x/day to prevent deconditioning during hospitalization. Patient verbalized understanding and agreement to mobilize only with RN assist for safety.     Patient left up in chair with all lines intact, call button in reach, RN notified, and daughter present.    GOALS:   Multidisciplinary Problems       Physical Therapy Goals          Problem: Physical Therapy    Goal Priority Disciplines Outcome Goal Variances Interventions   Physical Therapy Goal     PT, PT/OT Progressing     Description: Goals to be met by:      Patient will increase functional independence with mobility by performin. Supine to sit with Modified [place]  2. Sit to supine with Modified [place]  3. Sit to stand transfer with Stand-by Assistance  4. Bed to chair transfer with Stand-by Assistance using LRAD  5. Gait  x 100 feet with Stand-by Assistance using LRAD.                          History:     Past Medical History:   Diagnosis Date    Acute biliary pancreatitis 2024    Acute hypoxemic respiratory failure 2021    Acute superficial venous thrombosis of lower extremity, bilateral 2022    Anemia 2024    Aortic atherosclerosis     Arthritis     knee joint pain    Asthma-COPD overlap syndrome 2022    Breast cancer 2002    left breast & lymph nodes-s/p sx with chemo     Bronchitis     with flu-2/2014    Cataracts, bilateral     Chronic anticoagulation 5/4/2022    Chronic diastolic heart failure 12/24/2019    Chronic kidney disease, stage 3     Class 1 obesity due to excess calories with serious comorbidity and body mass index (BMI) of 30.0 to 30.9 in adult 5/16/2024    History of chemotherapy     last treatment 12/2002 (had 8 treatments)    Hyperlipidemia 11/20/2016    Hypertension     Lymphedema of both lower extremities 1/25/2022    Nephrolithiasis 6/2/2014    Osteoporosis     Paroxysmal atrial fibrillation 6/7/2021    Renal calculi     hematuria    Renal osteodystrophy 1/14/2016    Venous stasis dermatitis of both lower extremities 1/25/2022    Vitamin D insufficiency        Past Surgical History:   Procedure Laterality Date    ABLATION, ATRIAL FLUTTER, TYPICAL N/A 6/17/2024    Procedure: Ablation, Atrial Flutter, Typical;  Surgeon: Taz Church MD;  Location: [facility] EP LAB;  Service: Cardiology;  Laterality: N/A;  AFL, RFA, LORENE, MAKAYLA (cx if SR), anes, MB, 3 Prep    BREAST BIOPSY Left 2002    core bx, +    BREAST BIOPSY Right 2018    core    BREAST BIOPSY Right 2019    BREAST LUMPECTOMY Left 2002    CYSTOSCOPY  4/28/2022    Procedure: CYSTOSCOPY;  Surgeon: Vik Ware MD;  Location: [facility] OR 1ST FLR;  Service: Urology;;    CYSTOSCOPY  5/30/2022    Procedure: CYSTOSCOPY;  Surgeon: Bonnie Knutson MD;  Location: [facility] OR 1ST FLR;  Service: Urology;;    ECHOCARDIOGRAM,TRANSESOPHAGEAL N/A 6/17/2024    Procedure: Transesophageal echo (MAKAYLA) intra-procedure log documentation;  Surgeon: Nestor Martinez MD;  Location: [facility] EP LAB;  Service: Cardiology;  Laterality: N/A;    ERCP N/A 7/30/2024    Procedure: ERCP (ENDOSCOPIC RETROGRADE CHOLANGIOPANCREATOGRAPHY);  Surgeon: Sierra Cotto MD;  Location: [facility] ENDO (2ND FLR);  Service: Endoscopy;  Laterality: N/A;    LASER LITHOTRIPSY  5/30/2022    Procedure: LITHOTRIPSY, USING LASER;  Surgeon: Bonnie Knutson MD;   [facility];  Service: Urology;;    LITHOTRIPSY      MASTECTOMY Left 06/2002    left-& lymph node dissection    REPLACEMENT OF STENT Right 5/30/2022    Procedure: REPLACEMENT, STENT;  Surgeon: Bonnie Knutson MD;  Location: [facility] OR [address] [place] [address];  Service: Urology;  Laterality: Right;    RETROGRADE PYELOGRAPHY Right 4/28/2022    Procedure: PYELOGRAM, RETROGRADE;  Surgeon: Vik Ware MD;  Location: [facility] OR [address] [place] [address];  Service: Urology;  Laterality: Right;    ROBOT-ASSISTED LAPAROSCOPIC PARTIAL NEPHRECTOMY USING DA JSEÚS XI Right 3/11/2021    Procedure: XI ROBOTIC NEPHRECTOMY, PARTIAL;  Surgeon: Taz Ortega MD;  [facility];  Service: Urology;  Laterality: Right;  4hr/ gen with regional  [place] confirmation:  [phone] for 11:15am case NC    URETEROSCOPIC REMOVAL OF URETERIC CALCULUS Right 5/30/2022    Procedure: REMOVAL, CALCULUS, URETER, URETEROSCOPIC;  Surgeon: Bonnie Knutson MD;  Location: [facility] OR [address] [place] [address];  Service: Urology;  Laterality: Right;    ureteroscopy Bilateral     6.14    URETEROSCOPY Right 5/30/2022    Procedure: URETEROSCOPY;  Surgeon: Bonnie Knutson MD;  Location: [address];  Service: Urology;  Laterality: Right;       Time Tracking:     PT Received On: 08/04/24  PT Start Time: 0915     PT Stop Time: 0940  PT Total Time (min): 25 min     Billable Minutes: Evaluation 10 and Therapeutic Activity 15      08/04/2024

## 2024-08-04 NOTE — ASSESSMENT & PLAN NOTE
Sudden onset abdominal pain not a/w eating c/f gallstone pancreatitis (given cholelithiasis seen on CTA and US). No fever, chills, or other s/s infection to suggest cholangitis. Does have elevated T bili and AST/ALT. Severe abdominal pain. No nausea or vomiting. No acute alcohol intake. Slight hypercalcemia of unknown etiology, however not profound enough to explain acute pancreatitis. Lipase >3000. CT AP with peripancreatic stranding and edema, greatest about the head and neck, favored to be related to pancreatitis. Location of pain plus CT findings and lab findings do not suggest nephrolithiasis as etiology.     - AES consulted given LFT elevation, appreciate recommendations  - MRCP to better delineate biliary obstruction  - cautious with fluids given CHF (no signs of decompensation, got 1L in ED)  - analgesic therapy  -General surgery recommends HIDA to definitively r/o cholecystitis  -If HIDA negative, General Surgery recommends ERCP w/ sphincterotomy due to co-morbidities increasing surgical risk     -HIDA was negative for cholecystitis;     ERCP with sphincterotomy performed by AES; patient given zosyn after procedure for 3 days    Patient pain has greatly improved; only mild tenderness present; handling meals without issue    Liver enzymes decreasing     Patient will discharge to SNF

## 2024-08-04 NOTE — TREATMENT PLAN
AES Treatment Plan      79 yo female PMHx of HTN, HLD, HFpEF, COPD (on 2L NC) and paroxysmal Afib (on Eliquis, last dose 7/26) who presents with RUQ and epigastric abdominal pain radiating to the back and found to have acute pancreatitis and cholelithiasis on imaging. AES consulted for concern of retained stone. Patient denies fevers, chills, nausea, vomiting, jaundice, scleral icterus.    Labs showed lipase >3000, , ALT 54, T bili 1.1, alk phos 162. CT AP and US abdomen with pancreatitis, cholelithiasis but no choledocholithiasis. No intra/extra hepatic biliary dilation or pancreatic duct dilation.    MRI MRCP without biliary ductal dilation or gallbladder stone.    S/p sphincterotomy on 7/30. Her enzymes strangely went up soon after but she was clinically doing well.    LFTS are now downtrending. Tolerating PO.      AES will sign off at this time. Please reach out with questions or concerns.     Karey Riojas MD  Gastroenterology and Hepatology Fellow, PGY-4  Pager # [phone]

## 2024-08-05 PROBLEM — J96.11 CHRONIC RESPIRATORY FAILURE WITH HYPOXIA: Chronic | Status: ACTIVE | Noted: 2021-06-26

## 2024-08-05 PROBLEM — K85.10 ACUTE BILIARY PANCREATITIS: Status: RESOLVED | Noted: 2024-07-27 | Resolved: 2024-08-05

## 2024-08-05 LAB
ALBUMIN SERPL BCP-MCNC: 1.6 G/DL (ref 3.5–5.2)
ALP SERPL-CCNC: 266 U/L (ref 55–135)
ALT SERPL W/O P-5'-P-CCNC: 51 U/L (ref 10–44)
ANION GAP SERPL CALC-SCNC: 7 MMOL/L (ref 8–16)
AST SERPL-CCNC: 75 U/L (ref 10–40)
BACTERIA #/AREA URNS AUTO: ABNORMAL /HPF
BASOPHILS # BLD AUTO: 0.1 K/UL (ref 0–0.2)
BASOPHILS NFR BLD: 0.7 % (ref 0–1.9)
BILIRUB SERPL-MCNC: 1 MG/DL (ref 0.1–1)
BILIRUB UR QL STRIP: NEGATIVE
BUN SERPL-MCNC: 21 MG/DL (ref 8–23)
CALCIUM SERPL-MCNC: 9.7 MG/DL (ref 8.7–10.5)
CHLORIDE SERPL-SCNC: 104 MMOL/L (ref 95–110)
CLARITY UR REFRACT.AUTO: ABNORMAL
CO2 SERPL-SCNC: 26 MMOL/L (ref 23–29)
COLOR UR AUTO: ABNORMAL
CREAT SERPL-MCNC: 0.9 MG/DL (ref 0.5–1.4)
DIFFERENTIAL METHOD BLD: ABNORMAL
EOSINOPHIL # BLD AUTO: 0.5 K/UL (ref 0–0.5)
EOSINOPHIL NFR BLD: 3.8 % (ref 0–8)
ERYTHROCYTE [DISTWIDTH] IN BLOOD BY AUTOMATED COUNT: 14.7 % (ref 11.5–14.5)
EST. GFR  (NO RACE VARIABLE): >60 ML/MIN/1.73 M^2
GLUCOSE SERPL-MCNC: 89 MG/DL (ref 70–110)
GLUCOSE UR QL STRIP: NEGATIVE
HCT VFR BLD AUTO: 33.2 % (ref 37–48.5)
HGB BLD-MCNC: 10.3 G/DL (ref 12–16)
HGB UR QL STRIP: ABNORMAL
HYALINE CASTS UR QL AUTO: 0 /LPF
IMM GRANULOCYTES # BLD AUTO: 0.37 K/UL (ref 0–0.04)
IMM GRANULOCYTES NFR BLD AUTO: 2.7 % (ref 0–0.5)
KETONES UR QL STRIP: NEGATIVE
LEUKOCYTE ESTERASE UR QL STRIP: ABNORMAL
LYMPHOCYTES # BLD AUTO: 2.2 K/UL (ref 1–4.8)
LYMPHOCYTES NFR BLD: 15.4 % (ref 18–48)
MCH RBC QN AUTO: 28.1 PG (ref 27–31)
MCHC RBC AUTO-ENTMCNC: 31 G/DL (ref 32–36)
MCV RBC AUTO: 91 FL (ref 82–98)
MICROSCOPIC COMMENT: ABNORMAL
MONOCYTES # BLD AUTO: 1.2 K/UL (ref 0.3–1)
MONOCYTES NFR BLD: 8.9 % (ref 4–15)
NEUTROPHILS # BLD AUTO: 9.6 K/UL (ref 1.8–7.7)
NEUTROPHILS NFR BLD: 68.5 % (ref 38–73)
NITRITE UR QL STRIP: NEGATIVE
NRBC BLD-RTO: 0 /100 WBC
PH UR STRIP: 7 [PH] (ref 5–8)
PLATELET # BLD AUTO: 257 K/UL (ref 150–450)
PMV BLD AUTO: 9.9 FL (ref 9.2–12.9)
POTASSIUM SERPL-SCNC: 4.8 MMOL/L (ref 3.5–5.1)
PROT SERPL-MCNC: 5.3 G/DL (ref 6–8.4)
PROT UR QL STRIP: ABNORMAL
RBC # BLD AUTO: 3.66 M/UL (ref 4–5.4)
RBC #/AREA URNS AUTO: >100 /HPF (ref 0–4)
SARS-COV-2 RNA RESP QL NAA+PROBE: NOT DETECTED
SODIUM SERPL-SCNC: 137 MMOL/L (ref 136–145)
SP GR UR STRIP: 1.02 (ref 1–1.03)
SQUAMOUS #/AREA URNS AUTO: 11 /HPF
URN SPEC COLLECT METH UR: ABNORMAL
WBC # BLD AUTO: 13.95 K/UL (ref 3.9–12.7)
WBC #/AREA URNS AUTO: 44 /HPF (ref 0–5)
WBC CLUMPS UR QL AUTO: ABNORMAL
YEAST UR QL AUTO: ABNORMAL

## 2024-08-05 PROCEDURE — 87086 URINE CULTURE/COLONY COUNT: CPT

## 2024-08-05 PROCEDURE — 81001 URINALYSIS AUTO W/SCOPE: CPT

## 2024-08-05 PROCEDURE — 25000003 PHARM REV CODE 250

## 2024-08-05 PROCEDURE — 87635 SARS-COV-2 COVID-19 AMP PRB: CPT

## 2024-08-05 PROCEDURE — 25000003 PHARM REV CODE 250: Performed by: STUDENT IN AN ORGANIZED HEALTH CARE EDUCATION/TRAINING PROGRAM

## 2024-08-05 PROCEDURE — 97530 THERAPEUTIC ACTIVITIES: CPT

## 2024-08-05 PROCEDURE — 94761 N-INVAS EAR/PLS OXIMETRY MLT: CPT

## 2024-08-05 PROCEDURE — 97110 THERAPEUTIC EXERCISES: CPT

## 2024-08-05 PROCEDURE — 94640 AIRWAY INHALATION TREATMENT: CPT

## 2024-08-05 PROCEDURE — 36415 COLL VENOUS BLD VENIPUNCTURE: CPT

## 2024-08-05 PROCEDURE — 80053 COMPREHEN METABOLIC PANEL: CPT

## 2024-08-05 PROCEDURE — 85025 COMPLETE CBC W/AUTO DIFF WBC: CPT

## 2024-08-05 PROCEDURE — 27000221 HC OXYGEN, UP TO 24 HOURS

## 2024-08-05 PROCEDURE — 87106 FUNGI IDENTIFICATION YEAST: CPT

## 2024-08-05 PROCEDURE — 87088 URINE BACTERIA CULTURE: CPT

## 2024-08-05 PROCEDURE — 21400001 HC TELEMETRY ROOM

## 2024-08-05 PROCEDURE — 99900035 HC TECH TIME PER 15 MIN (STAT)

## 2024-08-05 RX ORDER — FUROSEMIDE 20 MG/1
40 TABLET ORAL DAILY
Status: DISCONTINUED | OUTPATIENT
Start: 2024-08-05 | End: 2024-08-12 | Stop reason: HOSPADM

## 2024-08-05 RX ADMIN — OXYCODONE HYDROCHLORIDE 5 MG: 5 TABLET ORAL at 08:08

## 2024-08-05 RX ADMIN — METOPROLOL SUCCINATE 100 MG: 50 TABLET, EXTENDED RELEASE ORAL at 09:08

## 2024-08-05 RX ADMIN — APIXABAN 5 MG: 5 TABLET, FILM COATED ORAL at 08:08

## 2024-08-05 RX ADMIN — FLUTICASONE FUROATE AND VILANTEROL TRIFENATATE 1 PUFF: 100; 25 POWDER RESPIRATORY (INHALATION) at 09:08

## 2024-08-05 RX ADMIN — FUROSEMIDE 40 MG: 20 TABLET ORAL at 03:08

## 2024-08-05 RX ADMIN — APIXABAN 5 MG: 5 TABLET, FILM COATED ORAL at 09:08

## 2024-08-05 RX ADMIN — OXYCODONE HYDROCHLORIDE 5 MG: 5 TABLET ORAL at 02:08

## 2024-08-05 NOTE — PLAN OF CARE
Problem: Adult Inpatient Plan of Care  Goal: Plan of Care Review  Outcome: Progressing  Goal: Patient-Specific Goal (Individualized)  Outcome: Progressing  Goal: Absence of Hospital-Acquired Illness or Injury  Outcome: Progressing  Goal: Optimal Comfort and Wellbeing  Outcome: Progressing  Goal: Readiness for Transition of Care  Outcome: Progressing     Problem: Acute Kidney Injury/Impairment  Goal: Fluid and Electrolyte Balance  Outcome: Progressing  Goal: Improved Oral Intake  Outcome: Progressing  Goal: Effective Renal Function  Outcome: Progressing     Problem: Wound  Goal: Optimal Coping  Outcome: Progressing  Goal: Optimal Functional Ability  Outcome: Progressing  Goal: Absence of Infection Signs and Symptoms  Outcome: Progressing  Goal: Improved Oral Intake  Outcome: Progressing  Goal: Optimal Pain Control and Function  Outcome: Progressing  Goal: Skin Health and Integrity  Outcome: Progressing  Goal: Optimal Wound Healing  Outcome: Progressing

## 2024-08-05 NOTE — ASSESSMENT & PLAN NOTE
Chronic, uncontrolled. Latest blood pressure and vitals reviewed-     Temp:  [97.5 °F (36.4 °C)-98.5 °F (36.9 °C)]   Pulse:  [66-86]   Resp:  [16-19]   BP: (143-167)/(68-77)   SpO2:  [93 %-96 %] .   Home meds for hypertension were reviewed and noted below.   Hypertension Medications               furosemide (LASIX) 40 MG tablet Take 1 tablet (40 mg total) by mouth once daily.    metoprolol succinate (TOPROL-XL) 100 MG 24 hr tablet Take 1 tablet (100 mg total) by mouth once daily.            While in the hospital, will manage blood pressure as follows; Continue home antihypertensive regiment      Will utilize p.r.n. blood pressure medication only if patient's blood pressure greater than 220/110 and she develops symptoms such as worsening chest pain or shortness of breath.

## 2024-08-05 NOTE — PT/OT/SLP PROGRESS
Occupational Therapy   Treatment    Name: Misa Barajas  MRN: [id number]  Admitting Diagnosis:  Acute biliary pancreatitis  6 Days Post-Op    Recommendations:     Discharge Recommendations: Moderate Intensity Therapy  Discharge Equipment Recommendations:  grab bar, walker, rolling  Barriers to discharge:   (increased (A) required)    Assessment:     Misa Barajas is a 78 y.o. female with a medical diagnosis of Acute biliary pancreatitis.  She presents with good participation and motivation. Performance deficits affecting function are weakness, impaired endurance, impaired self care skills, impaired functional mobility, impaired balance, decreased safety awareness, decreased lower extremity function, decreased upper extremity function.     Rehab Prognosis:  Fair; patient would benefit from acute skilled OT services to address these deficits and reach maximum level of function.       Plan:     Patient to be seen 4 x/week to address the above listed problems via self-care/home management, therapeutic activities, therapeutic exercises, neuromuscular re-education  Plan of Care Expires: 08/28/24  Plan of Care Reviewed with: patient    Subjective     Chief Complaint: none stated  Patient/Family Comments/goals: Pt was agreeable to therapy session.  Pain/Comfort:  Pain Rating 1: 0/10  Pain Rating Post-Intervention 1: 0/10    Objective:     Communicated with: RN prior to session.  Patient found supine with PureWick (no family present) upon OT entry to room.    General Precautions: Standard, fall    Orthopedic Precautions:N/A  Braces: N/A     Occupational Performance:       [facility] 6 Click ADL: 16    Treatment & Education:  Per RN, pt just got back into bed 10 minutes prior to OT session after being up in chair for 3 hours.  Pt agreeable to therex session. Pt performed AROM exercises with BUE in 4 planes x 10 reps each while in bed with HOB elevated. Provided pt with yellow theraputty.  Pt participated in activities with  theraputty to facilitate increased gross  as well as pincer .  Provided education on safety with theraputty as well as therex with putty.  Pt had no further questions & when asked whether there were any concerns pt reported none.      Patient left supine with all lines intact, call button in reach, bed alarm on, and RN notified    GOALS:   Multidisciplinary Problems       Occupational Therapy Goals          Problem: Occupational Therapy    Goal Priority Disciplines Outcome Interventions   Occupational Therapy Goal     OT, PT/OT Progressing    Description: Goals to be met by: 8/28     Patient will increase functional independence with ADLs by performing:    UE Dressing with Stand-by Assistance.  LE Dressing with Stand-by Assistance.  Grooming while standing at sink with Contact Guard Assistance.  Sit>stand with RW from EOB with SBA  Supine to sit with SBA                       Time Tracking:     OT Date of Treatment: 08/05/24  OT Start Time: 1335  OT Stop Time: 1355  OT Total Time (min): 20 min    Billable Minutes:Therapeutic Exercise 20    OT/MARYA: OT          8/5/2024

## 2024-08-05 NOTE — PLAN OF CARE
Discharge Plan A and Plan B have been determined by review of patient's clinical status, future medical and therapeutic needs, and coverage/benefits for post-acute care in coordination with multidisciplinary team members.    08/05/24 0905   Post-Acute Status   Post-Acute Authorization Placement   Post-Acute Placement Status Pending post-acute provider review/more information requested   Coverage [employer] - [employer] ADV -   Discharge Plan   Discharge Plan A Home with family;Home Health   Discharge Plan B Skilled Nursing Facility     MARINE phoned [employer] [phone] to f/u on status of SNF placement. Admissions staff not available at that time. MARINE left message for callback.      12:00pm  SW received phone call from Kat ([employer]). Kat states that due to surge in covid cases at the facility, facility may not have a bed for patient. Kat states that it is unlikely that facility will have non-covid bed for patient today but states that she is working to confirm bed availability and will notify SW once she has an absolute answer. MARINE f/u w/ [facility] regarding bed availability. Per Steph ([facility]), no bed available until end of the week at earliest. MARINE phoned daughter Marina to review. MARINE discussed the above w/ Marina and notified Marina that per MD, patient is medically ready for dc today. MARINE encouraged daughter to review list again to identify 3rd preference and daughter states that she will review and provide SW w/ additional preference today.      3:30pm  MARINE f/u w/ daughter Marina for update on additional preferences. Daughter states that she has not selected additional preferences but does plan to view 2 facilities tomorrow am. MARINE advised daughter that as of today patient is medically ready for dc, SW will need additional preferences , dc planning process does not include touring facilities once patient is medically ready. Daughter verbalized understanding Daughter  "asking to be allowed until tomorrow am for additional preferences stating "I thought she was going to [facility], I wasn't expecting her to not be able to  because of covid ". SW verbalized understanding and reinforced the importance of timeliness; SW will need additional preferences no later than tomorrow am to move process forward. Daughter verbalized understanding.     SW will continue to follow.                      SULEIMAN Phillips, LMSW  [facility]  Case Management  Ext. [phone]   "

## 2024-08-05 NOTE — ASSESSMENT & PLAN NOTE
Creatine stable for now. BMP reviewed- noted Estimated Creatinine Clearance: 50.1 mL/min (based on SCr of 0.9 mg/dL). according to latest data. Based on current GFR, CKD stage is stage 3 - GFR 30-59.  Monitor UOP and serial BMP and adjust therapy as needed. Renally dose meds. Avoid nephrotoxic medications and procedures.    Renal lab values as of (8/5/24) are within normal range    Plan:   - Strict I&Os and daily weights   - Trend Cr  - Strict I&Os  - Avoid nephrotoxic agents (NSAIDs, ACEi/Arbs, aminoglycoside abx, zozyn, amphotericin, tenofovir)  - Renally adjust medications (metformin, gabapentin, cefepime, morphine)

## 2024-08-05 NOTE — PROGRESS NOTES
[facility] - Med Surg ([address] Medicine  Progress Note    Patient Name: Misa Barajas  MRN: [id number]  Patient Class: IP- Inpatient   Admission Date: 7/26/2024  Length of Stay: 9 days  Attending Physician: Jamey Raya, *  Primary Care Provider: Celia Carlisle MD        Subjective:     Principal Problem:Acute biliary pancreatitis        HPI:  Misa Barajas is a 78F with a PMHx of HTN, HLD, HFpEF, COPD (on 2L NC baseline O2 sat 92%) and pAF who presented to [facility] 7/26 for acute onset abdominal pain that radiated to her back. States she has RUQ and epigastric pain that started acutely this afternoon. She rates her pain as 10/10 intensity, sharp and non-pleuritic in nature. She reports a past history of nephrolithiasis but states that this pain is different. Pain is not post-prandial in nature and explains that this is differen than her past episodes of nephrolithiasis. SOB started at around thesame time and she normally is on 2L/min of at home oxygen due to her COPD. She states that she was watching TV when the pain suddenly started. She denies nausea, vomiting, fever, chills, or LUTS including hamturia or dysuria. Recent admission 7/12-7/18 for pasturella bactermia d/t cat bite, completed 2wk CTX (EOT 7/26, midline removed 7/26). No recent changes in appetite reported. She also denies diarrhea, constipation.     In ED, Slightly hypertensive 172/83, no tachycardia, on 2L NC saturating 98%, afebrile. CBC with slightly worsened anemia (Hb 8.9, baseline seems approx 10), no leukocytosis. CMP with hypokalemia K 3.4, hypercalcemia (corrected 11.5), Alb 2.6, LFT elevation including T. Bili 1.1, AST//54, . Lipase >3000. , troponin normal. CTA (obtained for aortic dissection r/o) showed cholelithiasis Distended BG with pericholecystic fluid and cholelithiasis. No intrahepatic or extrahepatic biliary ductal dilatation. CTA showed cholelithiasis so we obtained a right upper  quadrant ultrasound as well that confirmed the cholelithiasis and could not necessarily rule out cholecystitis. Gave CTX and 1L IVF.     Overview/Hospital Course:  CTA Abdomen/pelvis (7/26/24) revealed non-obstruction renal calculi, cholelithiasis, possible early cholecystitis, no signs of choledocholithiasis, and stranding in the pancreas suggestive of pancreatits. MRCP on 7/28/24 showed cholelithiasis and pancreatitis without choledocolithiasis or cholecystitis. General surgery consulted and recommends a HIDA to definitively rule out Cholecystitis. If HIDA is negative, general surgery recommends an ERCP with sphincterotomy instead of a cholecystectomy due to co-morbidities increasing surgical risk. HIDA scan was negative for cholecystitis. Patient underwent an ERCP with sphincterotomy 7/30/24. Pain controlled, PT/OT with recommendations for moderate intensity therapy. SW aware. Pain has greatly improved and there there is no longer reported tenderness.     Interval History: Ms. Barajas reported no abdominal pain or tenderness today. There was some noted left hand swelling (history of left mastectomy). Her peripheral edema was also more pronounced. She was restarted on her Lasix. She worked with PT/OT today; required more oxygen (up to 4 L) during therapy.     Review of Systems   Constitutional:  Negative for chills, diaphoresis and fever.   Respiratory:  Positive for shortness of breath. Negative for cough.         Patient requires 2L oxygen at home; required 4L during physical therapy.    Cardiovascular:  Positive for leg swelling. Negative for chest pain and palpitations.        Leg swelling has worsened today.   Gastrointestinal:  Negative for abdominal pain, constipation, diarrhea, nausea and vomiting.        Patient reports not abdominal pain today   Genitourinary:  Negative for difficulty urinating and dysuria.   Musculoskeletal:  Negative for arthralgias and myalgias.   Skin:  Negative for color change.    Neurological:  Negative for dizziness and headaches.     Objective:     Vital Signs (Most Recent):  Temp: 98.4 °F (36.9 °C) (08/05/24 1509)  Pulse: 77 (08/05/24 1509)  Resp: 18 (08/05/24 1509)  BP: (!) 167/72 (08/05/24 1509)  SpO2: 95 % (08/05/24 1509) Vital Signs (24h Range):  Temp:  [97.5 °F (36.4 °C)-98.5 °F (36.9 °C)] 98.4 °F (36.9 °C)  Pulse:  [66-86] 77  Resp:  [16-19] 18  SpO2:  [93 %-96 %] 95 %  BP: (143-167)/(68-77) 167/72     Weight: 77.1 kg (169 lb 15.6 oz)  Body mass index is 29.18 kg/m².    Intake/Output Summary (Last 24 hours) at 8/5/2024 1811  Last data filed at 8/5/2024 1137  Gross per 24 hour   Intake --   Output 600 ml   Net -600 ml         Physical Exam  Constitutional:       General: She is not in acute distress.     Appearance: Normal appearance. She is not ill-appearing.   HENT:      Head: Normocephalic and atraumatic.   Eyes:      General: No scleral icterus.     Conjunctiva/sclera: Conjunctivae normal.   Cardiovascular:      Rate and Rhythm: Normal rate and regular rhythm.      Pulses: Normal pulses.      Heart sounds: Normal heart sounds.      Comments: Some bradycardia overnight; asymptomatic   Pulmonary:      Effort: Pulmonary effort is normal.      Breath sounds: Normal breath sounds.   Abdominal:      General: Abdomen is flat. Bowel sounds are normal. There is no distension.      Palpations: Abdomen is soft.      Tenderness: There is no abdominal tenderness. There is no rebound.      Comments: No tenderness today   Musculoskeletal:         General: Normal range of motion.      Cervical back: Normal range of motion and neck supple.      Right lower leg: Edema present.      Left lower leg: Edema present.      Comments: 3+ pitting edema in both lower extremities; lasix restarted    2+ pitting edema in left hand but not right. History of left massectomy       Skin:     General: Skin is warm and dry.      Coloration: Skin is not jaundiced.   Neurological:      Mental Status: She is alert  and oriented to person, place, and time.   Psychiatric:         Mood and Affect: Mood normal.         Behavior: Behavior normal.             Significant Labs: All pertinent labs within the past 24 hours have been reviewed.    Significant Imaging: I have reviewed all pertinent imaging results/findings within the past 24 hours.    Assessment/Plan:      COPD (chronic obstructive pulmonary disease)  Patient's COPD is controlled currently.  Patient is currently off COPD Pathway. Continue scheduled inhalers Supplemental oxygen and monitor respiratory status closely.     No wheezing, no s/s respiratory illness. On home 2L NC.     - No Brezztri on formulary, started on breo    Chronic diastolic heart failure  No signs of decompensation. Stable RLE edema. No orthopnea, no rales.    - Continue home metoprolol        Paroxysmal atrial fibrillation  Patient with Paroxysmal (<7 days) atrial fibrillation which is controlled currently with Beta Blocker. Patient is currently in sinus rhythm.[id number] Score: 3. Anticoagulation not indicated due to possible procedure .    CKD (chronic kidney disease) stage 3, GFR 30-59 ml/min  Creatine stable for now. BMP reviewed- noted Estimated Creatinine Clearance: 50.1 mL/min (based on SCr of 0.9 mg/dL). according to latest data. Based on current GFR, CKD stage is stage 3 - GFR 30-59.  Monitor UOP and serial BMP and adjust therapy as needed. Renally dose meds. Avoid nephrotoxic medications and procedures.    Renal lab values as of (8/5/24) are within normal range    Plan:   - Strict I&Os and daily weights   - Trend Cr  - Strict I&Os  - Avoid nephrotoxic agents (NSAIDs, ACEi/Arbs, aminoglycoside abx, zozyn, amphotericin, tenofovir)  - Renally adjust medications (metformin, gabapentin, cefepime, morphine)    Secondary hyperparathyroidism of renal origin  Causing slight hypercalcemia.     - Check calcitriol; wnl  - CTM CMP      Essential hypertension  Chronic, uncontrolled. Latest blood pressure  and vitals reviewed-     Temp:  [97.5 °F (36.4 °C)-98.5 °F (36.9 °C)]   Pulse:  [66-86]   Resp:  [16-19]   BP: (143-167)/(68-77)   SpO2:  [93 %-96 %] .   Home meds for hypertension were reviewed and noted below.   Hypertension Medications               furosemide (LASIX) 40 MG tablet Take 1 tablet (40 mg total) by mouth once daily.    metoprolol succinate (TOPROL-XL) 100 MG 24 hr tablet Take 1 tablet (100 mg total) by mouth once daily.            While in the hospital, will manage blood pressure as follows; Continue home antihypertensive regiment      Will utilize p.r.n. blood pressure medication only if patient's blood pressure greater than 220/110 and she develops symptoms such as worsening chest pain or shortness of breath.      VTE Risk Mitigation (From admission, onward)           Ordered     apixaban tablet 5 mg  2 times daily         08/02/24 1910     Reason for No Pharmacological VTE Prophylaxis  Once        Comments: Anticipate possible procerdure   Question:  Reasons:  Answer:  Physician Provided (leave comment)    07/27/24 0430     IP VTE HIGH RISK PATIENT  Once         07/27/24 0430     Place sequential compression device  Until discontinued         07/27/24 0430                    Discharge Planning   NOHEMI: 8/5/2024     Code Status: Full Code   Is the patient medically ready for discharge?:     Reason for patient still in hospital (select all that apply): Patient trending condition, Treatment, and Pending disposition  Discharge Plan A: Skilled Nursing Facility                  Karey Liang MD  Department of Hospital Medicine   [facility] - Med Surg ([facility]

## 2024-08-05 NOTE — PLAN OF CARE
[facility] ([facility])  Discharge Reassessment    Primary Care Provider: Celia Carlisle MD    Expected Discharge Date: 8/5/2024    Reassessment (most recent)       Discharge Reassessment - 08/05/24 1335          Discharge Reassessment    Assessment Type Discharge Planning Reassessment (P)      Did the patient's condition or plan change since previous assessment? No (P)      Discharge Plan discussed with: Adult children (P)    Marina Gruber (Daughter)  [phone]    Communicated NOHEMI with patient/caregiver Yes (P)      Discharge Plan A Skilled Nursing Facility (P)      Discharge Plan B Home with family;Home Health (P)      Transition of Care Barriers None (P)      Why the patient remains in the hospital Acute bed availability (P)         Post-Acute Status    Post-Acute Authorization Placement (P)      Post-Acute Placement Status Pending post-acute provider review/more information requested (P)      Coverage [employer] ADVANT[employer] - [employer] AD[employer] - (P)                  Discharge Plan A and Plan B have been determined by review of patient's clinical status, future medical and therapeutic needs, and coverage/benefits for post-acute care in coordination with multidisciplinary team members.                               SULEIMAN Phillips, LMSW  [facility]  Case Management  Ext. [phone]

## 2024-08-05 NOTE — PLAN OF CARE
Problem: Occupational Therapy  Goal: Occupational Therapy Goal  Description: Goals to be met by: 8/28     Patient will increase functional independence with ADLs by performing:    UE Dressing with Stand-by Assistance.  LE Dressing with Stand-by Assistance.  Grooming while standing at sink with Contact Guard Assistance.  Sit>stand with RW from EOB with SBA  Supine to sit with SBA  Outcome: Progressing     Goals remain appropriate

## 2024-08-05 NOTE — PT/OT/SLP PROGRESS
Physical Therapy Treatment    Patient Name:  Misa Barajas   MRN:  [id number]    Recommendations:     Discharge Recommendations: Moderate Intensity Therapy  Discharge Equipment Recommendations: walker, rolling, grab bar  Barriers to discharge: None    Assessment:     Misa Barajas is a 78 y.o. female admitted with a medical diagnosis of Acute biliary pancreatitis.  She presents with the following impairments/functional limitations: weakness, impaired endurance, impaired self care skills, impaired functional mobility, gait instability, impaired balance, impaired cardiopulmonary response to activity, decreased safety awareness Pt. cooperative and tolerated treatment fairly well except appeared SOB after transfer to chair and pt. requested to increased supplemental oxygen from 3L to 4L.    Rehab Prognosis: Fair; patient would benefit from acute skilled PT services to address these deficits and reach maximum level of function.    Recent Surgery: Procedure(s) (LRB):  ERCP (ENDOSCOPIC RETROGRADE CHOLANGIOPANCREATOGRAPHY) (N/A) 6 Days Post-Op    Plan:     During this hospitalization, patient to be seen 4 x/week to address the identified rehab impairments via gait training, therapeutic activities, therapeutic exercises, neuromuscular re-education and progress toward the following goals:    Plan of Care Expires:  09/03/24    Subjective     Chief Complaint: weakness  Patient/Family Comments/goals: pt. Agreeable to PT  Pain/Comfort:  Pain Rating 1: 0/10  Pain Rating Post-Intervention 1: 0/10      Objective:     Communicated with nursing prior to session.  Patient found supine with peripheral IV, oxygen, telemetry upon PT entry to room.     General Precautions: Standard, fall  Orthopedic Precautions: N/A  Braces: N/A  Respiratory Status: Nasal cannula, flow 3 L/min     Functional Mobility:  Bed Mobility:     Rolling Left:  minimum assistance  Scooting: minimum assistance  Supine to Sit: moderate assistance  Transfers:      Sit to Stand:  minimum assistance with rolling walker  Bed to Chair: minimum assistance with  rolling walker  using  Stand Pivot  Gait: 10 small steps from bed to bedside chair with RW and Min A with decreased step length/faith and forward flexed posture  Balance: fair      AM-PAC 6 CLICK MOBILITY  Turning over in bed (including adjusting bedclothes, sheets and blankets)?: 3  Sitting down on and standing up from a chair with arms (e.g., wheelchair, bedside commode, etc.): 3  Moving from lying on back to sitting on the side of the bed?: 2  Moving to and from a bed to a chair (including a wheelchair)?: 3  Need to walk in hospital room?: 3  Climbing 3-5 steps with a railing?: 1  Basic Mobility Total Score: 15       Treatment & Education:  Discussed pt.'s progress, goals, and POC. Pt. Performed (B) LE therex x10 reps seated in bedside chair with AROM.    Patient left up in chair with all lines intact and call button in reach..    GOALS:   Multidisciplinary Problems       Physical Therapy Goals          Problem: Physical Therapy    Goal Priority Disciplines Outcome Goal Variances Interventions   Physical Therapy Goal     PT, PT/OT Progressing     Description: Goals to be met by:      Patient will increase functional independence with mobility by performin. Supine to sit with Modified [place]  2. Sit to supine with Modified [place]  3. Sit to stand transfer with Stand-by Assistance  4. Bed to chair transfer with Stand-by Assistance using LRAD  5. Gait  x 100 feet with Stand-by Assistance using LRAD.                          Time Tracking:     PT Received On: 24  PT Start Time: 1100     PT Stop Time: 1123  PT Total Time (min): 23 min     Billable Minutes: Therapeutic Activity 13 and Therapeutic Exercise 10    Treatment Type: Treatment  PT/PTA: PT     Number of PTA visits since last PT visit: 0     2024

## 2024-08-05 NOTE — SUBJECTIVE & OBJECTIVE
Interval History: Ms. Barajas reported no abdominal pain or tenderness today. There was some noted left hand swelling (history of left mastectomy). Her peripheral edema was also more pronounced. She was restarted on her Lasix. She worked with PT/OT today; required more oxygen (up to 4 L) during therapy.     Review of Systems   Constitutional:  Negative for chills, diaphoresis and fever.   Respiratory:  Positive for shortness of breath. Negative for cough.         Patient requires 2L oxygen at home; required 4L during physical therapy.    Cardiovascular:  Positive for leg swelling. Negative for chest pain and palpitations.        Leg swelling has worsened today.   Gastrointestinal:  Negative for abdominal pain, constipation, diarrhea, nausea and vomiting.        Patient reports not abdominal pain today   Genitourinary:  Negative for difficulty urinating and dysuria.   Musculoskeletal:  Negative for arthralgias and myalgias.   Skin:  Negative for color change.   Neurological:  Negative for dizziness and headaches.     Objective:     Vital Signs (Most Recent):  Temp: 98.4 °F (36.9 °C) (08/05/24 1509)  Pulse: 77 (08/05/24 1509)  Resp: 18 (08/05/24 1509)  BP: (!) 167/72 (08/05/24 1509)  SpO2: 95 % (08/05/24 1509) Vital Signs (24h Range):  Temp:  [97.5 °F (36.4 °C)-98.5 °F (36.9 °C)] 98.4 °F (36.9 °C)  Pulse:  [66-86] 77  Resp:  [16-19] 18  SpO2:  [93 %-96 %] 95 %  BP: (143-167)/(68-77) 167/72     Weight: 77.1 kg (169 lb 15.6 oz)  Body mass index is 29.18 kg/m².    Intake/Output Summary (Last 24 hours) at 8/5/2024 1811  Last data filed at 8/5/2024 1137  Gross per 24 hour   Intake --   Output 600 ml   Net -600 ml         Physical Exam  Constitutional:       General: She is not in acute distress.     Appearance: Normal appearance. She is not ill-appearing.   HENT:      Head: Normocephalic and atraumatic.   Eyes:      General: No scleral icterus.     Conjunctiva/sclera: Conjunctivae normal.   Cardiovascular:      Rate and  Rhythm: Normal rate and regular rhythm.      Pulses: Normal pulses.      Heart sounds: Normal heart sounds.      Comments: Some bradycardia overnight; asymptomatic   Pulmonary:      Effort: Pulmonary effort is normal.      Breath sounds: Normal breath sounds.   Abdominal:      General: Abdomen is flat. Bowel sounds are normal. There is no distension.      Palpations: Abdomen is soft.      Tenderness: There is no abdominal tenderness. There is no rebound.      Comments: No tenderness today   Musculoskeletal:         General: Normal range of motion.      Cervical back: Normal range of motion and neck supple.      Right lower leg: Edema present.      Left lower leg: Edema present.      Comments: 3+ pitting edema in both lower extremities; lasix restarted    2+ pitting edema in left hand but not right. History of left massectomy       Skin:     General: Skin is warm and dry.      Coloration: Skin is not jaundiced.   Neurological:      Mental Status: She is alert and oriented to person, place, and time.   Psychiatric:         Mood and Affect: Mood normal.         Behavior: Behavior normal.             Significant Labs: All pertinent labs within the past 24 hours have been reviewed.    Significant Imaging: I have reviewed all pertinent imaging results/findings within the past 24 hours.

## 2024-08-06 PROBLEM — D72.829 LEUKOCYTOSIS: Status: ACTIVE | Noted: 2024-08-06

## 2024-08-06 LAB
ALBUMIN SERPL BCP-MCNC: 1.6 G/DL (ref 3.5–5.2)
ALP SERPL-CCNC: 263 U/L (ref 55–135)
ALT SERPL W/O P-5'-P-CCNC: 42 U/L (ref 10–44)
ANION GAP SERPL CALC-SCNC: 9 MMOL/L (ref 8–16)
AST SERPL-CCNC: 53 U/L (ref 10–40)
BACTERIA UR CULT: ABNORMAL
BASOPHILS # BLD AUTO: 0.15 K/UL (ref 0–0.2)
BASOPHILS NFR BLD: 1 % (ref 0–1.9)
BILIRUB SERPL-MCNC: 0.9 MG/DL (ref 0.1–1)
BUN SERPL-MCNC: 19 MG/DL (ref 8–23)
CALCIUM SERPL-MCNC: 9.6 MG/DL (ref 8.7–10.5)
CHLORIDE SERPL-SCNC: 102 MMOL/L (ref 95–110)
CO2 SERPL-SCNC: 28 MMOL/L (ref 23–29)
CREAT SERPL-MCNC: 0.8 MG/DL (ref 0.5–1.4)
DIFFERENTIAL METHOD BLD: ABNORMAL
EOSINOPHIL # BLD AUTO: 0.6 K/UL (ref 0–0.5)
EOSINOPHIL NFR BLD: 3.9 % (ref 0–8)
ERYTHROCYTE [DISTWIDTH] IN BLOOD BY AUTOMATED COUNT: 14.6 % (ref 11.5–14.5)
EST. GFR  (NO RACE VARIABLE): >60 ML/MIN/1.73 M^2
GLUCOSE SERPL-MCNC: 95 MG/DL (ref 70–110)
HCT VFR BLD AUTO: 33.7 % (ref 37–48.5)
HGB BLD-MCNC: 10.5 G/DL (ref 12–16)
IMM GRANULOCYTES # BLD AUTO: 0.39 K/UL (ref 0–0.04)
IMM GRANULOCYTES NFR BLD AUTO: 2.5 % (ref 0–0.5)
LYMPHOCYTES # BLD AUTO: 2.4 K/UL (ref 1–4.8)
LYMPHOCYTES NFR BLD: 15.2 % (ref 18–48)
MAGNESIUM SERPL-MCNC: 1.7 MG/DL (ref 1.6–2.6)
MCH RBC QN AUTO: 28.2 PG (ref 27–31)
MCHC RBC AUTO-ENTMCNC: 31.2 G/DL (ref 32–36)
MCV RBC AUTO: 91 FL (ref 82–98)
MONOCYTES # BLD AUTO: 1.3 K/UL (ref 0.3–1)
MONOCYTES NFR BLD: 8 % (ref 4–15)
NEUTROPHILS # BLD AUTO: 10.9 K/UL (ref 1.8–7.7)
NEUTROPHILS NFR BLD: 69.4 % (ref 38–73)
NRBC BLD-RTO: 0 /100 WBC
PLATELET # BLD AUTO: 239 K/UL (ref 150–450)
PMV BLD AUTO: 10 FL (ref 9.2–12.9)
POTASSIUM SERPL-SCNC: 3.6 MMOL/L (ref 3.5–5.1)
PROT SERPL-MCNC: 5.2 G/DL (ref 6–8.4)
RBC # BLD AUTO: 3.72 M/UL (ref 4–5.4)
SODIUM SERPL-SCNC: 139 MMOL/L (ref 136–145)
WBC # BLD AUTO: 15.72 K/UL (ref 3.9–12.7)

## 2024-08-06 PROCEDURE — 99900035 HC TECH TIME PER 15 MIN (STAT)

## 2024-08-06 PROCEDURE — 25000003 PHARM REV CODE 250

## 2024-08-06 PROCEDURE — 21400001 HC TELEMETRY ROOM

## 2024-08-06 PROCEDURE — 80053 COMPREHEN METABOLIC PANEL: CPT

## 2024-08-06 PROCEDURE — 27000221 HC OXYGEN, UP TO 24 HOURS

## 2024-08-06 PROCEDURE — 63600175 PHARM REV CODE 636 W HCPCS

## 2024-08-06 PROCEDURE — 25000003 PHARM REV CODE 250: Performed by: STUDENT IN AN ORGANIZED HEALTH CARE EDUCATION/TRAINING PROGRAM

## 2024-08-06 PROCEDURE — 85025 COMPLETE CBC W/AUTO DIFF WBC: CPT

## 2024-08-06 PROCEDURE — 94640 AIRWAY INHALATION TREATMENT: CPT

## 2024-08-06 PROCEDURE — 83735 ASSAY OF MAGNESIUM: CPT | Performed by: STUDENT IN AN ORGANIZED HEALTH CARE EDUCATION/TRAINING PROGRAM

## 2024-08-06 PROCEDURE — 36415 COLL VENOUS BLD VENIPUNCTURE: CPT

## 2024-08-06 PROCEDURE — 94761 N-INVAS EAR/PLS OXIMETRY MLT: CPT

## 2024-08-06 RX ORDER — MAGNESIUM SULFATE HEPTAHYDRATE 40 MG/ML
2 INJECTION, SOLUTION INTRAVENOUS ONCE
Status: COMPLETED | OUTPATIENT
Start: 2024-08-06 | End: 2024-08-06

## 2024-08-06 RX ORDER — LOSARTAN POTASSIUM 50 MG/1
50 TABLET ORAL DAILY
Status: DISCONTINUED | OUTPATIENT
Start: 2024-08-06 | End: 2024-08-12 | Stop reason: HOSPADM

## 2024-08-06 RX ORDER — POTASSIUM CHLORIDE 750 MG/1
30 CAPSULE, EXTENDED RELEASE ORAL
Status: COMPLETED | OUTPATIENT
Start: 2024-08-06 | End: 2024-08-06

## 2024-08-06 RX ADMIN — APIXABAN 5 MG: 5 TABLET, FILM COATED ORAL at 08:08

## 2024-08-06 RX ADMIN — MAGNESIUM SULFATE HEPTAHYDRATE 2 G: 40 INJECTION, SOLUTION INTRAVENOUS at 09:08

## 2024-08-06 RX ADMIN — METOPROLOL SUCCINATE 100 MG: 50 TABLET, EXTENDED RELEASE ORAL at 08:08

## 2024-08-06 RX ADMIN — POTASSIUM CHLORIDE 30 MEQ: 750 CAPSULE, EXTENDED RELEASE ORAL at 09:08

## 2024-08-06 RX ADMIN — FLUTICASONE FUROATE AND VILANTEROL TRIFENATATE 1 PUFF: 100; 25 POWDER RESPIRATORY (INHALATION) at 01:08

## 2024-08-06 RX ADMIN — LOSARTAN POTASSIUM 50 MG: 50 TABLET, FILM COATED ORAL at 04:08

## 2024-08-06 RX ADMIN — OXYCODONE HYDROCHLORIDE 5 MG: 5 TABLET ORAL at 09:08

## 2024-08-06 RX ADMIN — FUROSEMIDE 40 MG: 20 TABLET ORAL at 08:08

## 2024-08-06 RX ADMIN — APIXABAN 5 MG: 5 TABLET, FILM COATED ORAL at 09:08

## 2024-08-06 RX ADMIN — POTASSIUM CHLORIDE 30 MEQ: 750 CAPSULE, EXTENDED RELEASE ORAL at 10:08

## 2024-08-06 NOTE — PLAN OF CARE
Discharge Plan A and Plan B have been determined by review of patient's clinical status, future medical and therapeutic needs, and coverage/benefits for post-acute care in coordination with multidisciplinary team members.    08/06/24 1333   Post-Acute Status   Post-Acute Authorization Placement   Post-Acute Placement Status Pending post-acute provider review/more information requested   Coverage [employer] - [employer] ADV -   Discharge Plan   Discharge Plan A Skilled Nursing Facility   Discharge Plan B Home Health;Home with family     SW received additional SNF preferences from daughter Marina. MARINE sent additional referrals to below listed Skilled Nursing Facilities    Marlena HAWTHORNE to f/u to confirm if SNFs able to accept.        2:15pm  MARINE phoned Marlena to f/u on SNF referral. Per central intake, not able to accept due to out of network w/ payor. MARINE f/u w/ [facility]. MARINE not able to reach admissions at that time; MARINE left  for callback. MARINE phoned [facility], MARINE not able to reach [facility] admissions at that time. MARINE left  for callback.                          Zhen Salas, MSW, LMSW  [facility]  Case Management  Ext. [phone]

## 2024-08-06 NOTE — PLAN OF CARE
Problem: Adult Inpatient Plan of Care  Goal: Plan of Care Review  Outcome: Progressing  Goal: Patient-Specific Goal (Individualized)  Outcome: Progressing  Goal: Absence of Hospital-Acquired Illness or Injury  Outcome: Progressing  Goal: Optimal Comfort and Wellbeing  Outcome: Progressing  Goal: Readiness for Transition of Care  Outcome: Progressing     Problem: Acute Kidney Injury/Impairment  Goal: Fluid and Electrolyte Balance  Outcome: Progressing  Goal: Improved Oral Intake  Outcome: Progressing  Goal: Effective Renal Function  Outcome: Progressing     Problem: Wound  Goal: Optimal Coping  Outcome: Progressing  Goal: Optimal Functional Ability  Outcome: Progressing  Goal: Absence of Infection Signs and Symptoms  Outcome: Progressing  Goal: Improved Oral Intake  Outcome: Progressing  Goal: Optimal Pain Control and Function  Outcome: Progressing  Goal: Skin Health and Integrity  Outcome: Progressing  Goal: Optimal Wound Healing  Outcome: Progressing     Problem: Skin Injury Risk Increased  Goal: Skin Health and Integrity  Outcome: Progressing     Problem: Pain Acute  Goal: Optimal Pain Control and Function  Outcome: Progressing     Problem: Infection  Goal: Absence of Infection Signs and Symptoms  Outcome: Progressing     Problem: Fall Injury Risk  Goal: Absence of Fall and Fall-Related Injury  Outcome: Progressing

## 2024-08-06 NOTE — PLAN OF CARE
[facility]    FACILITY TRANSFER ORDERS      Patient Name: Misa Barajas  YOB: 1945    PCP: Celia Carlisle MD   PCP Address: [address] [place] [address] / [place] [id number]  PCP Phone Number: [phone]  PCP Fax: [phone]    Encounter Date: 08/09/2024    Admit to: Skilled Nursing Facility    Vital Signs:  Routine    Diagnoses:   Active Hospital Problems    Diagnosis  POA    Left arm swelling [M79.89]  Unknown    Leukocytosis [D72.829]  Yes    COPD (chronic obstructive pulmonary disease) [J44.9]  Yes     Continue current medication. F/u with pulmonology      Chronic diastolic heart failure [I50.32]  Yes    Chronic respiratory failure with hypoxia [J96.11]  Yes     Chronic    Paroxysmal atrial fibrillation [I48.0]  Yes    CKD (chronic kidney disease) stage 3, GFR 30-59 ml/min [N18.30]  Yes     Chronic    Secondary hyperparathyroidism of renal origin [N25.81]  Yes    Essential hypertension [I10]  Yes     Chronic      Resolved Hospital Problems    Diagnosis Date Resolved POA    *Acute pancreatitis [K85.90] 08/09/2024 Yes       Allergies:  Review of patient's allergies indicates:   Allergen Reactions    Adhesive tape-silicones     Adhesive Rash     Pt states she removed a LATEX bandaid and the skin beneath was swollen and red. No other latex causes a reaction.       Diet: regular diet    Activities: Activity as tolerated    Goals of Care Treatment Preferences:  Code Status: Full Code      Nursing: Per protocol     Labs:  Per protocol      CONSULTS:    Physical Therapy to evaluate and treat.  and Occupational Therapy to evaluate and treat.      WOUND CARE ORDERS  None    Medications: Review discharge medications with patient and family and provide education.         Medication List        START taking these medications      losartan 50 MG tablet  Commonly known as: COZAAR  Take 1 tablet (50 mg total) by mouth once daily.            CHANGE how you take these medications      ELIQUIS 5 mg  Tab  Generic drug: apixaban  Take 1 tablet (5 mg total) by mouth 2 (two) times daily.  What changed:   medication strength  how much to take  when to take this            CONTINUE taking these medications      acetaminophen 500 MG tablet  Commonly known as: TYLENOL  Take 2 tablets (1,000 mg total) by mouth every 8 (eight) hours as needed for Pain.     albuterol 90 mcg/actuation inhaler  Commonly known as: PROVENTIL HFA  Inhale 2 puffs into the lungs every 6 (six) hours as needed for Wheezing. Rescue     aspirin 81 MG EC tablet  Commonly known as: ECOTRIN  Take 81 mg by mouth once daily.     biotin 1 mg tablet  Take 1,000 mcg by mouth once daily.     BREZTRI AEROSPHERE 160-9-4.8 mcg/actuation Hfaa  Generic drug: budesonide-glycopyr-formoterol  Inhale 2 puffs into the lungs 2 (two) times daily.     busPIRone 5 MG Tab  Commonly known as: BUSPAR  Take 5 mg by mouth daily as needed (anxiety).     fluticasone propionate 50 mcg/actuation nasal spray  Commonly known as: FLONASE  1 spray by Each Nostril route daily as needed for Rhinitis or Allergies.     furosemide 40 MG tablet  Commonly known as: LASIX  Take 1 tablet (40 mg total) by mouth once daily.     inhalation spacing device  Use as directed for inhalation.     metoprolol succinate 100 MG 24 hr tablet  Commonly known as: TOPROL-XL  Take 1 tablet (100 mg total) by mouth once daily.     vitamin D 1000 units Tab  Commonly known as: VITAMIN D3  Take 2 tablets (2,000 Units total) by mouth once daily.            STOP taking these medications      D5W PgBk 100 mL with cefTRIAXone 2 gram SolR 2 g                Immunizations Administered as of 8/9/2024       Name Date Dose VIS Date Route Exp Date    COVID-19, MRNA, LN-S PF (Pfizer) (Purple Cap) 12/10/2021 0.3 mL 5/10/2021 Intramuscular --    Site: Right arm     : Pf[employer] Inc     Lot: [id number]     Comment: Adminis     COVID-19, MRNA, LN-S PF (Pfizer) (Purple Cap) 2/4/2021  3:16 PM 0.3 mL 12/12/2020 Intramuscular  5/31/2021    Site: Right deltoid     Given By: Ana Woods, RN     : Pf[employer]     Lot: [id number]     COVID-19, MRNA, LN-S, PF (Pfizer) (Purple Cap) 1/13/2021  1:14 PM 0.3 mL 12/12/2020 Intramuscular 1/13/2021    Site: Right deltoid     Given By: Misa Ritchie LPN     : Pf[employer] Inc     Lot: [id number]             This patient has had both covid vaccinations    Some patients may experience side effects after vaccination.  These may include fever, headache, muscle or joint aches.  Most symptoms resolve with 24-48 hours and do not require urgent medical evaluation unless they persist for more than 72 hours or symptoms are concerning for an unrelated medical condition.          _________________________________  Karey Liang MD  08/09/2024

## 2024-08-06 NOTE — ASSESSMENT & PLAN NOTE
Chronic, uncontrolled. Latest blood pressure and vitals reviewed-     Temp:  [97.5 °F (36.4 °C)-97.8 °F (36.6 °C)]   Pulse:  [65-80]   Resp:  [16-18]   BP: (149-176)/(75-90)   SpO2:  [90 %-98 %] .   Home meds for hypertension were reviewed and noted below.   Hypertension Medications               furosemide (LASIX) 40 MG tablet Take 1 tablet (40 mg total) by mouth once daily.    metoprolol succinate (TOPROL-XL) 100 MG 24 hr tablet Take 1 tablet (100 mg total) by mouth once daily.          While in the hospital, will manage blood pressure as follows; Continue home antihypertensive regimen

## 2024-08-06 NOTE — PROGRESS NOTES
[facility] ([address] Medicine  Progress Note    Patient Name: Misa Barajas  MRN: [id number]  Patient Class: IP- Inpatient   Admission Date: 7/26/2024  Length of Stay: 10 days  Attending Physician: Jamey Raya, *  Primary Care Provider: Celia Carlisle MD        Subjective:     Principal Problem:Acute biliary pancreatitis        HPI:  Misa Barajas is a 78F with a PMHx of HTN, HLD, HFpEF, COPD (on 2L NC baseline O2 sat 92%) and pAF who [place] [facility] [place] acute onset abdominal pain that radiated to her back. States she has RUQ and epigastric pain that started acutely this afternoon. She rates her pain as 10/10 intensity, sharp and non-pleuritic in nature. She reports a past history of nephrolithiasis but states that this pain is different. Pain is not post-prandial in nature and explains that this is differen than her past episodes of nephrolithiasis. SOB started at around thesame time and she normally is on 2L/min of at home oxygen due to her COPD. She states that she was watching TV when the pain suddenly started. She denies nausea, vomiting, fever, chills, or LUTS including hamturia or dysuria. Recent admission 7/12-7/18 for pasturella bactermia d/t cat bite, completed 2wk CTX (EOT 7/26, midline removed 7/26). No recent changes in appetite reported. She also denies diarrhea, constipation.     In ED, Slightly hypertensive 172/83, no tachycardia, on 2L NC saturating 98%, afebrile. CBC with slightly worsened anemia (Hb 8.9, baseline seems approx 10), no leukocytosis. CMP with hypokalemia K 3.4, hypercalcemia (corrected 11.5), Alb 2.6, LFT elevation including T. Bili 1.1, AST//54, . Lipase >3000. , troponin normal. CTA (obtained for aortic dissection r/o) showed cholelithiasis Distended BG with pericholecystic fluid and cholelithiasis. No intrahepatic or extrahepatic biliary ductal dilatation. CTA showed cholelithiasis so we obtained a right upper  quadrant ultrasound as well that confirmed the cholelithiasis and could not necessarily rule out cholecystitis. Gave CTX and 1L IVF.     Overview/Hospital Course:  Patient admitted for pancreatitis 2/2 likely blocked duct. CTA Abdomen/pelvis (7/26/24) revealed non-obstruction renal calculi, cholelithiasis, possible early cholecystitis, no signs of choledocholithiasis, and stranding in the pancreas suggestive of pancreatits. MRCP on 7/28/24 showed cholelithiasis and pancreatitis without choledocolithiasis or cholecystitis. General surgery consulted and recommends a HIDA to definitively rule out Cholecystitis. If HIDA is negative, general surgery recommends an ERCP with sphincterotomy instead of a cholecystectomy due to co-morbidities increasing surgical risk. HIDA scan was negative for cholecystitis. Patient underwent an ERCP with sphincterotomy 7/30/24. Pain controlled, PT/OT with recommendations for moderate intensity therapy. SW aware. Pain has greatly improved and there there is no longer reported tenderness. Patient with persistent leukocytosis, given patient AF, physical exam unrevealing for source of infection, likely stress induced vs reaction to     Interval History: NAEO. Persistent leukocytosis on labs, physical exam unrevealing and little to no pain.     Review of Systems   Constitutional:  Negative for chills, diaphoresis and fever.   Respiratory:  Positive for shortness of breath. Negative for cough.         Patient requires 2L oxygen at home   Cardiovascular:  Positive for leg swelling. Negative for chest pain and palpitations.   Gastrointestinal:  Negative for abdominal pain, constipation, diarrhea, nausea and vomiting.        Patient reports not abdominal pain today   Genitourinary:  Negative for difficulty urinating and dysuria.   Musculoskeletal:  Negative for arthralgias and myalgias.   Skin:  Negative for color change.   Neurological:  Negative for dizziness and headaches.     Objective:      Vital Signs (Most Recent):  Temp: 97.8 °F (36.6 °C) (08/06/24 1205)  Pulse: 72 (08/06/24 1506)  Resp: 16 (08/06/24 1307)  BP: (!) 169/77 (08/06/24 1205)  SpO2: 98 % (08/06/24 1309) Vital Signs (24h Range):  Temp:  [97.5 °F (36.4 °C)-97.8 °F (36.6 °C)] 97.8 °F (36.6 °C)  Pulse:  [65-80] 72  Resp:  [16-18] 16  SpO2:  [90 %-98 %] 98 %  BP: (149-170)/(75-90) 169/77     Weight: 77.1 kg (169 lb 15.6 oz)  Body mass index is 29.18 kg/m².    Intake/Output Summary (Last 24 hours) at 8/6/2024 1601  Last data filed at 8/6/2024 1431  Gross per 24 hour   Intake 600 ml   Output 2000 ml   Net -1400 ml         Physical Exam  Constitutional:       General: She is not in acute distress.     Appearance: Normal appearance. She is not ill-appearing.   HENT:      Head: Normocephalic and atraumatic.   Eyes:      General: No scleral icterus.     Conjunctiva/sclera: Conjunctivae normal.   Cardiovascular:      Rate and Rhythm: Normal rate and regular rhythm.      Pulses: Normal pulses.      Heart sounds: Normal heart sounds.   Pulmonary:      Effort: Pulmonary effort is normal.      Breath sounds: Normal breath sounds.   Abdominal:      General: Abdomen is flat. Bowel sounds are normal. There is no distension.      Palpations: Abdomen is soft.      Tenderness: There is no abdominal tenderness. There is no rebound.      Comments: No tenderness today   Musculoskeletal:         General: Normal range of motion.      Cervical back: Normal range of motion and neck supple.      Right lower leg: Edema present.      Left lower leg: Edema present.      Comments: 3+ pitting edema in both lower extremities; lasix restarted    2+ pitting edema in left hand but not right. History of left massectomy       Skin:     General: Skin is warm and dry.      Coloration: Skin is not jaundiced.   Neurological:      Mental Status: She is alert and oriented to person, place, and time.   Psychiatric:         Mood and Affect: Mood normal.         Behavior: Behavior  normal.             Significant Labs: All pertinent labs within the past 24 hours have been reviewed.    Significant Imaging: I have reviewed all pertinent imaging results/findings within the past 24 hours.    Assessment/Plan:      * Acute biliary pancreatitis  Sudden onset abdominal pain not a/w eating c/f gallstone pancreatitis (given cholelithiasis seen on CTA and US). No fever, chills, or other s/s infection to suggest cholangitis. Does have elevated T bili and AST/ALT. Severe abdominal pain. No nausea or vomiting. No acute alcohol intake. Slight hypercalcemia of unknown etiology, however not profound enough to explain acute pancreatitis. Lipase >3000. CT AP with peripancreatic stranding and edema, greatest about the head and neck, favored to be related to pancreatitis. Location of pain plus CT findings and lab findings do not suggest nephrolithiasis as etiology.     - AES consulted given LFT elevation, appreciate recommendations  - MRCP to better delineate biliary obstruction  - cautious with fluids given CHF (no signs of decompensation, got 1L in ED)  - analgesic therapy  -General surgery recommends HIDA to definitively r/o cholecystitis  -If HIDA negative, General Surgery recommends ERCP w/ sphincterotomy due to co-morbidities increasing surgical risk     -HIDA was negative for cholecystitis;     ERCP with sphincterotomy performed by AES; patient given zosyn after procedure for 3 days    Patient pain has greatly improved; only mild tenderness present; handling meals without issue    Liver enzymes decreasing     Patient will discharge to [facility]      Leukocytosis  Persistent, physical exam unrevealing, AF  Likely stress induced      COPD (chronic obstructive pulmonary disease)  Patient's COPD is controlled currently.  Patient is currently off COPD Pathway. Continue scheduled inhalers Supplemental oxygen and monitor respiratory status closely.     No wheezing, no s/s respiratory illness. On home 2L NC.     - No  Sofíatri on formulary, started on breo    Chronic diastolic heart failure  No signs of decompensation. Stable RLE edema. No orthopnea, no rales.    - Continue home metoprolol        Paroxysmal atrial fibrillation  Patient with Paroxysmal (<7 days) atrial fibrillation which is controlled currently with Beta Blocker. Patient is currently in sinus rhythm.[id number] Score: 3. Anticoagulation not indicated due to possible procedure .    CKD (chronic kidney disease) stage 3, GFR 30-59 ml/min  Creatine stable for now. BMP reviewed- noted Estimated Creatinine Clearance: 50.1 mL/min (based on SCr of 0.9 mg/dL). according to latest data. Based on current GFR, CKD stage is stage 3 - GFR 30-59.  Monitor UOP and serial BMP and adjust therapy as needed. Renally dose meds. Avoid nephrotoxic medications and procedures.    Renal lab values as of (8/5/24) are within normal range    Plan:   - Strict I&Os and daily weights   - Trend Cr  - Strict I&Os  - Avoid nephrotoxic agents (NSAIDs, ACEi/Arbs, aminoglycoside abx, zozyn, amphotericin, tenofovir)  - Renally adjust medications (metformin, gabapentin, cefepime, morphine)    Secondary hyperparathyroidism of renal origin  Causing slight hypercalcemia.     - Check calcitriol; wnl  - CTM CMP      Essential hypertension  Chronic, uncontrolled. Latest blood pressure and vitals reviewed-     Temp:  [97.5 °F (36.4 °C)-97.8 °F (36.6 °C)]   Pulse:  [65-80]   Resp:  [16-18]   BP: (149-176)/(75-90)   SpO2:  [90 %-98 %] .   Home meds for hypertension were reviewed and noted below.   Hypertension Medications               furosemide (LASIX) 40 MG tablet Take 1 tablet (40 mg total) by mouth once daily.    metoprolol succinate (TOPROL-XL) 100 MG 24 hr tablet Take 1 tablet (100 mg total) by mouth once daily.          While in the hospital, will manage blood pressure as follows; Continue home antihypertensive regimen      VTE Risk Mitigation (From admission, onward)           Ordered     apixaban tablet  5 mg  2 times daily         08/02/24 1910     Reason for No Pharmacological VTE Prophylaxis  Once        Comments: Anticipate possible procerdure   Question:  Reasons:  Answer:  Physician Provided (leave comment)    07/27/24 0430     IP VTE HIGH RISK PATIENT  Once         07/27/24 0430     Place sequential compression device  Until discontinued         07/27/24 0430                    Discharge Planning   NOHEMI: 8/7/2024     Code Status: Full Code   Is the patient medically ready for discharge?:     Reason for patient still in hospital (select all that apply): Pending disposition  Discharge Plan A: Skilled Nursing Facility                  Michael Romero MD  Department of Hospital Medicine   [facility] ([place])

## 2024-08-06 NOTE — SUBJECTIVE & OBJECTIVE
Interval History: NAEO. Persistent leukocytosis on labs, physical exam unrevealing and little to no pain.     Review of Systems   Constitutional:  Negative for chills, diaphoresis and fever.   Respiratory:  Positive for shortness of breath. Negative for cough.         Patient requires 2L oxygen at home   Cardiovascular:  Positive for leg swelling. Negative for chest pain and palpitations.   Gastrointestinal:  Negative for abdominal pain, constipation, diarrhea, nausea and vomiting.        Patient reports not abdominal pain today   Genitourinary:  Negative for difficulty urinating and dysuria.   Musculoskeletal:  Negative for arthralgias and myalgias.   Skin:  Negative for color change.   Neurological:  Negative for dizziness and headaches.     Objective:     Vital Signs (Most Recent):  Temp: 97.8 °F (36.6 °C) (08/06/24 1205)  Pulse: 72 (08/06/24 1506)  Resp: 16 (08/06/24 1307)  BP: (!) 169/77 (08/06/24 1205)  SpO2: 98 % (08/06/24 1309) Vital Signs (24h Range):  Temp:  [97.5 °F (36.4 °C)-97.8 °F (36.6 °C)] 97.8 °F (36.6 °C)  Pulse:  [65-80] 72  Resp:  [16-18] 16  SpO2:  [90 %-98 %] 98 %  BP: (149-170)/(75-90) 169/77     Weight: 77.1 kg (169 lb 15.6 oz)  Body mass index is 29.18 kg/m².    Intake/Output Summary (Last 24 hours) at 8/6/2024 1601  Last data filed at 8/6/2024 1431  Gross per 24 hour   Intake 600 ml   Output 2000 ml   Net -1400 ml         Physical Exam  Constitutional:       General: She is not in acute distress.     Appearance: Normal appearance. She is not ill-appearing.   HENT:      Head: Normocephalic and atraumatic.   Eyes:      General: No scleral icterus.     Conjunctiva/sclera: Conjunctivae normal.   Cardiovascular:      Rate and Rhythm: Normal rate and regular rhythm.      Pulses: Normal pulses.      Heart sounds: Normal heart sounds.   Pulmonary:      Effort: Pulmonary effort is normal.      Breath sounds: Normal breath sounds.   Abdominal:      General: Abdomen is flat. Bowel sounds are normal.  There is no distension.      Palpations: Abdomen is soft.      Tenderness: There is no abdominal tenderness. There is no rebound.      Comments: No tenderness today   Musculoskeletal:         General: Normal range of motion.      Cervical back: Normal range of motion and neck supple.      Right lower leg: Edema present.      Left lower leg: Edema present.      Comments: 3+ pitting edema in both lower extremities; lasix restarted    2+ pitting edema in left hand but not right. History of left massectomy       Skin:     General: Skin is warm and dry.      Coloration: Skin is not jaundiced.   Neurological:      Mental Status: She is alert and oriented to person, place, and time.   Psychiatric:         Mood and Affect: Mood normal.         Behavior: Behavior normal.             Significant Labs: All pertinent labs within the past 24 hours have been reviewed.    Significant Imaging: I have reviewed all pertinent imaging results/findings within the past 24 hours.

## 2024-08-07 PROBLEM — K85.90 ACUTE PANCREATITIS: Status: ACTIVE | Noted: 2024-07-27

## 2024-08-07 PROBLEM — M79.89 LEFT ARM SWELLING: Status: ACTIVE | Noted: 2024-08-07

## 2024-08-07 PROBLEM — K85.10 ACUTE BILIARY PANCREATITIS: Status: RESOLVED | Noted: 2024-07-27 | Resolved: 2024-08-07

## 2024-08-07 LAB
ALBUMIN SERPL BCP-MCNC: 1.6 G/DL (ref 3.5–5.2)
ANION GAP SERPL CALC-SCNC: 6 MMOL/L (ref 8–16)
BASOPHILS # BLD AUTO: 0.12 K/UL (ref 0–0.2)
BASOPHILS NFR BLD: 0.8 % (ref 0–1.9)
BUN SERPL-MCNC: 17 MG/DL (ref 8–23)
CALCIUM SERPL-MCNC: 9.9 MG/DL (ref 8.7–10.5)
CHLORIDE SERPL-SCNC: 104 MMOL/L (ref 95–110)
CO2 SERPL-SCNC: 27 MMOL/L (ref 23–29)
CREAT SERPL-MCNC: 0.9 MG/DL (ref 0.5–1.4)
DIFFERENTIAL METHOD BLD: ABNORMAL
EOSINOPHIL # BLD AUTO: 0.6 K/UL (ref 0–0.5)
EOSINOPHIL NFR BLD: 4 % (ref 0–8)
ERYTHROCYTE [DISTWIDTH] IN BLOOD BY AUTOMATED COUNT: 14.7 % (ref 11.5–14.5)
EST. GFR  (NO RACE VARIABLE): >60 ML/MIN/1.73 M^2
GLUCOSE SERPL-MCNC: 107 MG/DL (ref 70–110)
HCT VFR BLD AUTO: 33.8 % (ref 37–48.5)
HGB BLD-MCNC: 10.2 G/DL (ref 12–16)
IMM GRANULOCYTES # BLD AUTO: 0.28 K/UL (ref 0–0.04)
IMM GRANULOCYTES NFR BLD AUTO: 1.8 % (ref 0–0.5)
LYMPHOCYTES # BLD AUTO: 2.4 K/UL (ref 1–4.8)
LYMPHOCYTES NFR BLD: 15.6 % (ref 18–48)
MAGNESIUM SERPL-MCNC: 2 MG/DL (ref 1.6–2.6)
MCH RBC QN AUTO: 28.7 PG (ref 27–31)
MCHC RBC AUTO-ENTMCNC: 30.2 G/DL (ref 32–36)
MCV RBC AUTO: 95 FL (ref 82–98)
MONOCYTES # BLD AUTO: 1.3 K/UL (ref 0.3–1)
MONOCYTES NFR BLD: 8.4 % (ref 4–15)
NEUTROPHILS # BLD AUTO: 10.6 K/UL (ref 1.8–7.7)
NEUTROPHILS NFR BLD: 69.4 % (ref 38–73)
NRBC BLD-RTO: 0 /100 WBC
PHOSPHATE SERPL-MCNC: 2.6 MG/DL (ref 2.7–4.5)
PLATELET # BLD AUTO: 225 K/UL (ref 150–450)
PMV BLD AUTO: 10 FL (ref 9.2–12.9)
POTASSIUM SERPL-SCNC: 4.6 MMOL/L (ref 3.5–5.1)
RBC # BLD AUTO: 3.55 M/UL (ref 4–5.4)
SODIUM SERPL-SCNC: 137 MMOL/L (ref 136–145)
WBC # BLD AUTO: 15.2 K/UL (ref 3.9–12.7)

## 2024-08-07 PROCEDURE — 25000003 PHARM REV CODE 250

## 2024-08-07 PROCEDURE — 21400001 HC TELEMETRY ROOM

## 2024-08-07 PROCEDURE — 25000003 PHARM REV CODE 250: Performed by: STUDENT IN AN ORGANIZED HEALTH CARE EDUCATION/TRAINING PROGRAM

## 2024-08-07 PROCEDURE — 83735 ASSAY OF MAGNESIUM: CPT | Performed by: STUDENT IN AN ORGANIZED HEALTH CARE EDUCATION/TRAINING PROGRAM

## 2024-08-07 PROCEDURE — 93971 EXTREMITY STUDY: CPT | Mod: 26,,, | Performed by: SURGERY

## 2024-08-07 PROCEDURE — 85025 COMPLETE CBC W/AUTO DIFF WBC: CPT

## 2024-08-07 PROCEDURE — 80069 RENAL FUNCTION PANEL: CPT

## 2024-08-07 PROCEDURE — 36415 COLL VENOUS BLD VENIPUNCTURE: CPT | Performed by: STUDENT IN AN ORGANIZED HEALTH CARE EDUCATION/TRAINING PROGRAM

## 2024-08-07 RX ORDER — LOSARTAN POTASSIUM 50 MG/1
50 TABLET ORAL DAILY
Qty: 90 TABLET | Refills: 3 | Status: SHIPPED | OUTPATIENT
Start: 2024-08-08 | End: 2025-08-08

## 2024-08-07 RX ORDER — DOXYLAMINE SUCCINATE 25 MG
TABLET ORAL 2 TIMES DAILY
Status: DISCONTINUED | OUTPATIENT
Start: 2024-08-07 | End: 2024-08-12 | Stop reason: HOSPADM

## 2024-08-07 RX ADMIN — APIXABAN 5 MG: 5 TABLET, FILM COATED ORAL at 10:08

## 2024-08-07 RX ADMIN — FLUTICASONE FUROATE AND VILANTEROL TRIFENATATE 1 PUFF: 100; 25 POWDER RESPIRATORY (INHALATION) at 08:08

## 2024-08-07 RX ADMIN — OXYCODONE HYDROCHLORIDE 5 MG: 5 TABLET ORAL at 05:08

## 2024-08-07 RX ADMIN — APIXABAN 5 MG: 5 TABLET, FILM COATED ORAL at 08:08

## 2024-08-07 RX ADMIN — FUROSEMIDE 40 MG: 20 TABLET ORAL at 08:08

## 2024-08-07 RX ADMIN — LOSARTAN POTASSIUM 50 MG: 50 TABLET, FILM COATED ORAL at 08:08

## 2024-08-07 RX ADMIN — METOPROLOL SUCCINATE 100 MG: 50 TABLET, EXTENDED RELEASE ORAL at 08:08

## 2024-08-07 RX ADMIN — SODIUM PHOSPHATE, MONOBASIC, MONOHYDRATE AND SODIUM PHOSPHATE, DIBASIC, ANHYDROUS 15 MMOL: 142; 276 INJECTION, SOLUTION INTRAVENOUS at 08:08

## 2024-08-07 RX ADMIN — OXYCODONE HYDROCHLORIDE 5 MG: 5 TABLET ORAL at 11:08

## 2024-08-07 NOTE — SUBJECTIVE & OBJECTIVE
Interval History: Losartan better controlling blood pressure. Awaiting SNF placement.     Review of Systems   Constitutional:  Negative for chills, diaphoresis and fever.   Respiratory:  Positive for shortness of breath. Negative for cough.         Patient requires 2L oxygen at home;    Cardiovascular:  Positive for leg swelling. Negative for chest pain and palpitations.        Leg swelling improving   Gastrointestinal:  Negative for abdominal pain, constipation, diarrhea, nausea and vomiting.        Patient reports no abdominal pain and is able to tolerate meals without pain, nausea, or vomiting    Genitourinary:  Negative for difficulty urinating and dysuria.   Musculoskeletal:  Negative for arthralgias and myalgias.   Skin:  Negative for color change.   Neurological:  Negative for dizziness and headaches.     Objective:     Vital Signs (Most Recent):  Temp: 98 °F (36.7 °C) (08/07/24 1127)  Pulse: 66 (08/07/24 1127)  Resp: 18 (08/07/24 1127)  BP: (!) 144/68 (08/07/24 1127)  SpO2: 95 % (08/07/24 1127) Vital Signs (24h Range):  Temp:  [97.5 °F (36.4 °C)-98.3 °F (36.8 °C)] 98 °F (36.7 °C)  Pulse:  [66-81] 66  Resp:  [16-18] 18  SpO2:  [92 %-95 %] 95 %  BP: ()/(64-82) 144/68     Weight: 77.1 kg (169 lb 15.6 oz)  Body mass index is 29.18 kg/m².    Intake/Output Summary (Last 24 hours) at 8/7/2024 1315  Last data filed at 8/7/2024 1210  Gross per 24 hour   Intake --   Output 1875 ml   Net -1875 ml         Physical Exam  Constitutional:       General: She is not in acute distress.     Appearance: Normal appearance. She is not ill-appearing.   HENT:      Head: Normocephalic and atraumatic.   Eyes:      General: No scleral icterus.     Conjunctiva/sclera: Conjunctivae normal.   Cardiovascular:      Rate and Rhythm: Normal rate and regular rhythm.      Pulses: Normal pulses.      Heart sounds: Normal heart sounds.   Pulmonary:      Effort: Pulmonary effort is normal.      Breath sounds: Normal breath sounds.    Abdominal:      General: Abdomen is flat. Bowel sounds are normal. There is no distension.      Palpations: Abdomen is soft.      Tenderness: There is no abdominal tenderness. There is no rebound.      Comments: No tenderness today   Musculoskeletal:         General: Normal range of motion.      Cervical back: Normal range of motion and neck supple.      Right lower leg: Edema present.      Left lower leg: Edema present.      Comments: Edema has reduced from 3+ bilateral lower extremities to 2+ on the left and 1+ on the right       Skin:     General: Skin is warm and dry.      Coloration: Skin is not jaundiced.   Neurological:      Mental Status: She is alert and oriented to person, place, and time.   Psychiatric:         Mood and Affect: Mood normal.         Behavior: Behavior normal.             Significant Labs: All pertinent labs within the past 24 hours have been reviewed.    Significant Imaging: I have reviewed all pertinent imaging results/findings within the past 24 hours.

## 2024-08-07 NOTE — PT/OT/SLP PROGRESS
Occupational Therapy      Patient Name:  Misa Barajas   MRN:  [id number]    Patient not seen today secondary to transport taking pt to ultrasound during pm attempt.  Will follow-up as scheduled per OT POC.    8/7/2024

## 2024-08-07 NOTE — PROGRESS NOTES
Medical Nutrition Therapy      Reason for Assessment: LOS  Dx: acute pancreatitis  Medical Hx: arthritis, HTN, HLD, breast cancer, osteoporosis, CKD stage 3    Interdisciplinary Rounds: Did not Attend  Discharge planning: cardiac diet     General Info: Spoke w/ pt at bedside, reports a good appetite. Pt denies any difficulties w/ chewing/swallowing at this time. No n/v. NFPE completed, 8/7, pt does not meet criteria for malnutrition.     Diet/Supplement PTA: N/A    Current Diet: Cardiac diet   % intake of meals: %    Ht: 5'4  Wt: 77.1 kg    Labs: Reviewed  Meds: Reviewed    Overall Physical Appearance: Well Nourished    Level of Risk: Low    Nutrition Dx: No nutrition diagnosis at this time:    Next Follow Up Date: 8/14

## 2024-08-07 NOTE — PROGRESS NOTES
[facility] - Med Surg ([address] Medicine  Progress Note    Patient Name: Misa Barajas  MRN: [id number]  Patient Class: IP- Inpatient   Admission Date: 7/26/2024  Length of Stay: 11 days  Attending Physician: Jamey Raya, *  Primary Care Provider: Celia Carlisle MD        Subjective:     Principal Problem:Acute pancreatitis        HPI:  Misa Barajas is a 78F with a PMHx of HTN, HLD, HFpEF, COPD (on 2L NC baseline O2 sat 92%) and pAF who presented to [facility] 7/26 for acute onset abdominal pain that radiated to her back. States she has RUQ and epigastric pain that started acutely this afternoon. She rates her pain as 10/10 intensity, sharp and non-pleuritic in nature. She reports a past history of nephrolithiasis but states that this pain is different. Pain is not post-prandial in nature and explains that this is differen than her past episodes of nephrolithiasis. SOB started at around thesame time and she normally is on 2L/min of at home oxygen due to her COPD. She states that she was watching TV when the pain suddenly started. She denies nausea, vomiting, fever, chills, or LUTS including hamturia or dysuria. Recent admission 7/12-7/18 for pasturella bactermia d/t cat bite, completed 2wk CTX (EOT 7/26, midline removed 7/26). No recent changes in appetite reported. She also denies diarrhea, constipation.     In ED, Slightly hypertensive 172/83, no tachycardia, on 2L NC saturating 98%, afebrile. CBC with slightly worsened anemia (Hb 8.9, baseline seems approx 10), no leukocytosis. CMP with hypokalemia K 3.4, hypercalcemia (corrected 11.5), Alb 2.6, LFT elevation including T. Bili 1.1, AST//54, . Lipase >3000. , troponin normal. CTA (obtained for aortic dissection r/o) showed cholelithiasis Distended BG with pericholecystic fluid and cholelithiasis. No intrahepatic or extrahepatic biliary ductal dilatation. CTA showed cholelithiasis so we obtained a right upper  quadrant ultrasound as well that confirmed the cholelithiasis and could not necessarily rule out cholecystitis. Gave CTX and 1L IVF.     Overview/Hospital Course:  Patient admitted for pancreatitis 2/2 likely blocked duct. CTA Abdomen/pelvis (7/26/24) revealed non-obstruction renal calculi, cholelithiasis, possible early cholecystitis, no signs of choledocholithiasis, and stranding in the pancreas suggestive of pancreatits. MRCP on 7/28/24 showed cholelithiasis and pancreatitis without choledocolithiasis or cholecystitis. General surgery consulted and recommends a HIDA to definitively rule out Cholecystitis. If HIDA is negative, general surgery recommends an ERCP with sphincterotomy instead of a cholecystectomy due to co-morbidities increasing surgical risk. HIDA scan was negative for cholecystitis. Patient underwent an ERCP with sphincterotomy 7/30/24. Pain controlled, PT/OT with recommendations for moderate intensity therapy. SW aware. Pain has greatly improved and there there is no longer reported tenderness. Patient with persistent leukocytosis, given patient AF, physical exam unrevealing for source of infection, likely stress induced but will consider referral to hematology outpatient to make sure no other underlying issues are present.     Interval History: Losartan better controlling blood pressure. Awaiting SNF placement.     Review of Systems   Constitutional:  Negative for chills, diaphoresis and fever.   Respiratory:  Positive for shortness of breath. Negative for cough.         Patient requires 2L oxygen at home;    Cardiovascular:  Positive for leg swelling. Negative for chest pain and palpitations.        Leg swelling improving   Gastrointestinal:  Negative for abdominal pain, constipation, diarrhea, nausea and vomiting.        Patient reports no abdominal pain and is able to tolerate meals without pain, nausea, or vomiting    Genitourinary:  Negative for difficulty urinating and dysuria.    Musculoskeletal:  Negative for arthralgias and myalgias.   Skin:  Negative for color change.   Neurological:  Negative for dizziness and headaches.     Objective:     Vital Signs (Most Recent):  Temp: 98 °F (36.7 °C) (08/07/24 1127)  Pulse: 66 (08/07/24 1127)  Resp: 18 (08/07/24 1127)  BP: (!) 144/68 (08/07/24 1127)  SpO2: 95 % (08/07/24 1127) Vital Signs (24h Range):  Temp:  [97.5 °F (36.4 °C)-98.3 °F (36.8 °C)] 98 °F (36.7 °C)  Pulse:  [66-81] 66  Resp:  [16-18] 18  SpO2:  [92 %-95 %] 95 %  BP: ()/(64-82) 144/68     Weight: 77.1 kg (169 lb 15.6 oz)  Body mass index is 29.18 kg/m².    Intake/Output Summary (Last 24 hours) at 8/7/2024 1315  Last data filed at 8/7/2024 1210  Gross per 24 hour   Intake --   Output 1875 ml   Net -1875 ml         Physical Exam  Constitutional:       General: She is not in acute distress.     Appearance: Normal appearance. She is not ill-appearing.   HENT:      Head: Normocephalic and atraumatic.   Eyes:      General: No scleral icterus.     Conjunctiva/sclera: Conjunctivae normal.   Cardiovascular:      Rate and Rhythm: Normal rate and regular rhythm.      Pulses: Normal pulses.      Heart sounds: Normal heart sounds.   Pulmonary:      Effort: Pulmonary effort is normal.      Breath sounds: Normal breath sounds.   Abdominal:      General: Abdomen is flat. Bowel sounds are normal. There is no distension.      Palpations: Abdomen is soft.      Tenderness: There is no abdominal tenderness. There is no rebound.      Comments: No tenderness today   Musculoskeletal:         General: Normal range of motion.      Cervical back: Normal range of motion and neck supple.      Right lower leg: Edema present.      Left lower leg: Edema present.      Comments: Edema has reduced from 3+ bilateral lower extremities to 2+ on the left and 1+ on the right       Skin:     General: Skin is warm and dry.      Coloration: Skin is not jaundiced.   Neurological:      Mental Status: She is alert and  oriented to person, place, and time.   Psychiatric:         Mood and Affect: Mood normal.         Behavior: Behavior normal.             Significant Labs: All pertinent labs within the past 24 hours have been reviewed.    Significant Imaging: I have reviewed all pertinent imaging results/findings within the past 24 hours.    Assessment/Plan:      * Acute pancreatitis  Sudden onset abdominal pain not a/w eating c/f gallstone pancreatitis (given cholelithiasis seen on CTA and US). No fever, chills, or other s/s infection to suggest cholangitis. Does have elevated T bili and AST/ALT. Severe abdominal pain. No nausea or vomiting. No acute alcohol intake. Slight hypercalcemia of unknown etiology, however not profound enough to explain acute pancreatitis. Lipase >3000. CT AP with peripancreatic stranding and edema, greatest about the head and neck, favored to be related to pancreatitis. Location of pain plus CT findings and lab findings do not suggest nephrolithiasis as etiology.     - AES consulted given LFT elevation, appreciate recommendations  - MRCP to better delineate biliary obstruction  - cautious with fluids given CHF (no signs of decompensation, got 1L in ED)  - analgesic therapy  -General surgery recommends HIDA to definitively r/o cholecystitis  -If HIDA negative, General Surgery recommends ERCP w/ sphincterotomy due to co-morbidities increasing surgical risk     -HIDA was negative for cholecystitis;     ERCP with sphincterotomy performed by AES; patient given zosyn after procedure for 3 days    Patient pain has greatly improved; only mild tenderness present; handling meals without issue    Liver enzymes decreasing     Patient [facility]      Leukocytosis  Persistent, physical exam unrevealing, AF  Likely stress induced      COPD (chronic obstructive pulmonary disease)  Patient's COPD is controlled currently.  Patient is currently off COPD Pathway. Continue scheduled inhalers Supplemental oxygen and  monitor respiratory status closely.     No wheezing, no s/s respiratory illness. On home 2L NC.     - No Brezztri on formulary, started on breo    Chronic diastolic heart failure  No signs of decompensation. Stable RLE edema. No orthopnea, no rales.    - Continue home metoprolol        Paroxysmal atrial fibrillation  Patient with Paroxysmal (<7 days) atrial fibrillation which is controlled currently with Beta Blocker. Patient is currently in sinus rhythm.[id number] Score: 3. Anticoagulation not indicated due to possible procedure .    CKD (chronic kidney disease) stage 3, GFR 30-59 ml/min  Creatine stable for now. BMP reviewed- noted Estimated Creatinine Clearance: 50.1 mL/min (based on SCr of 0.9 mg/dL). according to latest data. Based on current GFR, CKD stage is stage 3 - GFR 30-59.  Monitor UOP and serial BMP and adjust therapy as needed. Renally dose meds. Avoid nephrotoxic medications and procedures.    Renal lab values as of (8/5/24) are within normal range    Plan:   - Strict I&Os and daily weights   - Trend Cr  - Strict I&Os  - Avoid nephrotoxic agents (NSAIDs, ACEi/Arbs, aminoglycoside abx, zozyn, amphotericin, tenofovir)  - Renally adjust medications (metformin, gabapentin, cefepime, morphine)    Secondary hyperparathyroidism of renal origin  Causing slight hypercalcemia.     - Check calcitriol; wnl  - CTM CMP      Essential hypertension  Chronic, uncontrolled. Latest blood pressure and vitals reviewed-     Temp:  [97.5 °F (36.4 °C)-98.3 °F (36.8 °C)]   Pulse:  [66-81]   Resp:  [16-18]   BP: ()/(64-82)   SpO2:  [92 %-95 %] .   Home meds for hypertension were reviewed and noted below.   Hypertension Medications               furosemide (LASIX) 40 MG tablet Take 1 tablet (40 mg total) by mouth once daily.    metoprolol succinate (TOPROL-XL) 100 MG 24 hr tablet Take 1 tablet (100 mg total) by mouth once daily.          While in the hospital, will manage blood pressure as follows; Continue home  antihypertensive regimen and start Losartan      VTE Risk Mitigation (From admission, onward)           Ordered     apixaban tablet 5 mg  2 times daily         08/02/24 1910     Reason for No Pharmacological VTE Prophylaxis  Once        Comments: Anticipate possible procerdure   Question:  Reasons:  Answer:  Physician Provided (leave comment)    07/27/24 0430     IP VTE HIGH RISK PATIENT  Once         07/27/24 0430     Place sequential compression device  Until discontinued         07/27/24 0430                    Discharge Planning   NOHEMI: 8/7/2024     Code Status: Full Code   Is the patient medically ready for discharge?:     Reason for patient still in hospital (select all that apply): Pending disposition  Discharge Plan A: Skilled Nursing Facility                  Karey Liang MD  Department of Hospital Medicine   [facility] ([place])

## 2024-08-07 NOTE — ASSESSMENT & PLAN NOTE
Chronic, uncontrolled. Latest blood pressure and vitals reviewed-     Temp:  [97.5 °F (36.4 °C)-98.3 °F (36.8 °C)]   Pulse:  [66-81]   Resp:  [16-18]   BP: ()/(64-82)   SpO2:  [92 %-95 %] .   Home meds for hypertension were reviewed and noted below.   Hypertension Medications               furosemide (LASIX) 40 MG tablet Take 1 tablet (40 mg total) by mouth once daily.    metoprolol succinate (TOPROL-XL) 100 MG 24 hr tablet Take 1 tablet (100 mg total) by mouth once daily.          While in the hospital, will manage blood pressure as follows; Continue home antihypertensive regimen and start Losartan

## 2024-08-07 NOTE — PLAN OF CARE
Problem: Adult Inpatient Plan of Care  Goal: Plan of Care Review  Outcome: Progressing  Goal: Patient-Specific Goal (Individualized)  Outcome: Progressing  Goal: Absence of Hospital-Acquired Illness or Injury  Outcome: Progressing  Goal: Optimal Comfort and Wellbeing  Outcome: Progressing  Goal: Readiness for Transition of Care  Outcome: Progressing     Problem: Acute Kidney Injury/Impairment  Goal: Fluid and Electrolyte Balance  Outcome: Progressing  Goal: Improved Oral Intake  Outcome: Progressing  Goal: Effective Renal Function  Outcome: Progressing      99

## 2024-08-07 NOTE — PLAN OF CARE
"Discharge Plan A and Plan B have been determined by review of patient's clinical status, future medical and therapeutic needs, and coverage/benefits for post-acute care in coordination with multidisciplinary team members.    08/07/24 1703   Post-Acute Status   Post-Acute Authorization Placement   Post-Acute Placement Status Pending post-acute provider review/more information requested   Coverage [employer] - [facility] ADV -   Discharge Plan   Discharge Plan A Skilled Nursing Facility   Discharge Plan B Home Health;Home with family     MARINE phoned daughter Marina for update on status of SNF placement. MARINE advised Marina that [facility] denies, [facility], [facility], [facility] still[facility] a/o today and MARINE f/u w/ [facility] x3, no response. MARINE advised Marina that blast of SNF referrals to be sent due to patient is medically ready and has no accepting preferred SNFs/bed avail at this time. Marina agreeable to MARINE sending blast of SNF referrals and states that she and her sister Esther will continue viewing facilities while waiting on responses from SNFs.    [facility] bed  [facility]  [facility] - [facility]  - [facility]  [facility] - no response    MARINE sent blast of SNF referrals to [facility], [facility] facilities.    11:00am  SW reviewed SNF referrals. Patient has the below accepting SNFs.    [facility]  MARINE phoned daughter Marina to review. MARINE notified Marina of the above and Marina agreeable but states that she [facility].    MARINE phoned [facility] confirmed that SNF does now have a bed. Per Kat "submitting auth today and will contact family for ppw ". MARINE confirmed w/ daughter Marina and notified Marina that we will plan for dc tomorrow 8/8 pending auth. Daughter agreeable to dc plan.    MARINE " will continue to follow.                              SULEIMAN Phillips, LMSW  [facility]  Case Management  Ext. [phone]

## 2024-08-08 LAB
ALBUMIN SERPL BCP-MCNC: 1.6 G/DL (ref 3.5–5.2)
ALP SERPL-CCNC: 212 U/L (ref 55–135)
ALT SERPL W/O P-5'-P-CCNC: 27 U/L (ref 10–44)
ANION GAP SERPL CALC-SCNC: 7 MMOL/L (ref 8–16)
AST SERPL-CCNC: 36 U/L (ref 10–40)
BASOPHILS # BLD AUTO: 0.11 K/UL (ref 0–0.2)
BASOPHILS NFR BLD: 0.8 % (ref 0–1.9)
BILIRUB SERPL-MCNC: 0.8 MG/DL (ref 0.1–1)
BUN SERPL-MCNC: 17 MG/DL (ref 8–23)
CALCIUM SERPL-MCNC: 9.4 MG/DL (ref 8.7–10.5)
CHLORIDE SERPL-SCNC: 103 MMOL/L (ref 95–110)
CO2 SERPL-SCNC: 27 MMOL/L (ref 23–29)
CREAT SERPL-MCNC: 0.9 MG/DL (ref 0.5–1.4)
DIFFERENTIAL METHOD BLD: ABNORMAL
EOSINOPHIL # BLD AUTO: 0.7 K/UL (ref 0–0.5)
EOSINOPHIL NFR BLD: 5.3 % (ref 0–8)
ERYTHROCYTE [DISTWIDTH] IN BLOOD BY AUTOMATED COUNT: 14.5 % (ref 11.5–14.5)
EST. GFR  (NO RACE VARIABLE): >60 ML/MIN/1.73 M^2
GLUCOSE SERPL-MCNC: 96 MG/DL (ref 70–110)
HCT VFR BLD AUTO: 32.8 % (ref 37–48.5)
HGB BLD-MCNC: 10.3 G/DL (ref 12–16)
IMM GRANULOCYTES # BLD AUTO: 0.22 K/UL (ref 0–0.04)
IMM GRANULOCYTES NFR BLD AUTO: 1.6 % (ref 0–0.5)
LYMPHOCYTES # BLD AUTO: 2.2 K/UL (ref 1–4.8)
LYMPHOCYTES NFR BLD: 16 % (ref 18–48)
MAGNESIUM SERPL-MCNC: 1.9 MG/DL (ref 1.6–2.6)
MCH RBC QN AUTO: 28.5 PG (ref 27–31)
MCHC RBC AUTO-ENTMCNC: 31.4 G/DL (ref 32–36)
MCV RBC AUTO: 91 FL (ref 82–98)
MONOCYTES # BLD AUTO: 1.3 K/UL (ref 0.3–1)
MONOCYTES NFR BLD: 9.3 % (ref 4–15)
NEUTROPHILS # BLD AUTO: 9.1 K/UL (ref 1.8–7.7)
NEUTROPHILS NFR BLD: 67 % (ref 38–73)
NRBC BLD-RTO: 0 /100 WBC
PLATELET # BLD AUTO: 203 K/UL (ref 150–450)
PMV BLD AUTO: 9.6 FL (ref 9.2–12.9)
POTASSIUM SERPL-SCNC: 4 MMOL/L (ref 3.5–5.1)
PROT SERPL-MCNC: 5 G/DL (ref 6–8.4)
RBC # BLD AUTO: 3.62 M/UL (ref 4–5.4)
SODIUM SERPL-SCNC: 137 MMOL/L (ref 136–145)
WBC # BLD AUTO: 13.63 K/UL (ref 3.9–12.7)

## 2024-08-08 PROCEDURE — 21400001 HC TELEMETRY ROOM

## 2024-08-08 PROCEDURE — 25000003 PHARM REV CODE 250: Performed by: STUDENT IN AN ORGANIZED HEALTH CARE EDUCATION/TRAINING PROGRAM

## 2024-08-08 PROCEDURE — 97110 THERAPEUTIC EXERCISES: CPT

## 2024-08-08 PROCEDURE — 36415 COLL VENOUS BLD VENIPUNCTURE: CPT | Performed by: STUDENT IN AN ORGANIZED HEALTH CARE EDUCATION/TRAINING PROGRAM

## 2024-08-08 PROCEDURE — 80053 COMPREHEN METABOLIC PANEL: CPT | Performed by: STUDENT IN AN ORGANIZED HEALTH CARE EDUCATION/TRAINING PROGRAM

## 2024-08-08 PROCEDURE — 25000003 PHARM REV CODE 250

## 2024-08-08 PROCEDURE — 86580 TB INTRADERMAL TEST: CPT

## 2024-08-08 PROCEDURE — 85025 COMPLETE CBC W/AUTO DIFF WBC: CPT

## 2024-08-08 PROCEDURE — 97530 THERAPEUTIC ACTIVITIES: CPT

## 2024-08-08 PROCEDURE — 83735 ASSAY OF MAGNESIUM: CPT | Performed by: STUDENT IN AN ORGANIZED HEALTH CARE EDUCATION/TRAINING PROGRAM

## 2024-08-08 PROCEDURE — 30200315 PPD INTRADERMAL TEST REV CODE 302

## 2024-08-08 PROCEDURE — 97530 THERAPEUTIC ACTIVITIES: CPT | Mod: CQ

## 2024-08-08 RX ADMIN — APIXABAN 5 MG: 5 TABLET, FILM COATED ORAL at 08:08

## 2024-08-08 RX ADMIN — OXYCODONE HYDROCHLORIDE 5 MG: 5 TABLET ORAL at 09:08

## 2024-08-08 RX ADMIN — FLUTICASONE FUROATE AND VILANTEROL TRIFENATATE 1 PUFF: 100; 25 POWDER RESPIRATORY (INHALATION) at 08:08

## 2024-08-08 RX ADMIN — FUROSEMIDE 40 MG: 20 TABLET ORAL at 08:08

## 2024-08-08 RX ADMIN — LOSARTAN POTASSIUM 50 MG: 50 TABLET, FILM COATED ORAL at 05:08

## 2024-08-08 RX ADMIN — MICONAZOLE NITRATE: 20 CREAM TOPICAL at 08:08

## 2024-08-08 RX ADMIN — TUBERCULIN PURIFIED PROTEIN DERIVATIVE 5 UNITS: 5 INJECTION, SOLUTION INTRADERMAL at 02:08

## 2024-08-08 RX ADMIN — METOPROLOL SUCCINATE 100 MG: 50 TABLET, EXTENDED RELEASE ORAL at 08:08

## 2024-08-08 NOTE — PT/OT/SLP PROGRESS
Occupational Therapy   Treatment    Name: Misa Barajas  MRN: [id number]  Admitting Diagnosis:  Acute pancreatitis  9 Days Post-Op    Recommendations:     Discharge Recommendations: Moderate Intensity Therapy  Discharge Equipment Recommendations:  grab bar, walker, rolling  Barriers to discharge:  Other (Comment) (increased A required)    Assessment:     Misa Barajas is a 78 y.o. female with a medical diagnosis of Acute pancreatitis.  She presents with Performance deficits affecting function are weakness, impaired endurance, impaired self care skills, gait instability, impaired functional mobility, decreased lower extremity function, decreased upper extremity function, impaired cardiopulmonary response to activity. Pt is A&O, agreeable to session focused on therapeutic exercise to support strength, endurance, and cardiopulmonary response to activity for ADL and mobility IND.    Rehab Prognosis:  Good; patient would benefit from acute skilled OT services to address these deficits and reach maximum level of function.       Plan:     Patient to be seen 4 x/week to address the above listed problems via self-care/home management, therapeutic activities, therapeutic exercises, neuromuscular re-education  Plan of Care Expires: 08/28/24  Plan of Care Reviewed with: patient    Subjective     Chief Complaint: fatigue  Patient/Family Comments/goals: To go home  Pain/Comfort:  Pain Rating 1: 0/10    Objective:     Communicated with: Nsg prior to session.  Patient found HOB elevated with oxygen, telemetry upon OT entry to room.    General Precautions: Standard, fall    Orthopedic Precautions:N/A  Braces: N/A  Respiratory Status: Nasal cannula     Occupational Performance:     Bed Mobility:    Patient completed Scooting/Bridging with stand by assistance  Patient completed Supine to Sit with stand by assistance  Patient completed Sit to Supine with stand by assistance   Extra time and SOB noted    Functional  Mobility/Transfers:  Deferred d/t fatigue AM PT with 2hrs in chair prior to session    Activities of Daily Living:  Deferred d/t completion with nursing staff in AM      [facility] 6 Click ADL: 16    Treatment & Education:  AROM provided to BUE in all planes of motion 1 x 10 reps. Completed to facilitate normalized movement to increase performance during functional activities. Rest breaks t/o d/t SOB.    B UE isometrics 5x 5second holds from 90deg shoulder flexion: shoulder flexion, shoulder extension; from 90deg elbow flexion: bicep flexion, tricep extension againstmin-mod resistance of therapist to facilitate normalized movement for improved UE reaching and function for (I)ADLs and ease of mobility. Rest breaks t/o d/t SOB.    -Education on energy conservation and task modification to maximize safety and (I) during ADLs and mobility  -Education on importance of OOB activity to improve overall activity tolerance and promote recovery    Pt had no further questions & when asked whether there were any concerns pt reported none.     Patient left HOB elevated with all lines intact and call button in reach    GOALS:   Multidisciplinary Problems       Occupational Therapy Goals          Problem: Occupational Therapy    Goal Priority Disciplines Outcome Interventions   Occupational Therapy Goal     OT, PT/OT Progressing    Description: Goals to be met by: 8/28     Patient will increase functional independence with ADLs by performing:    UE Dressing with Stand-by Assistance.  LE Dressing with Stand-by Assistance.  Grooming while standing at sink with Contact Guard Assistance.  Sit>stand with RW from EOB with SBA  Supine to sit with SBA                       Time Tracking:     OT Date of Treatment: 08/08/24  OT Start Time: 1152  OT Stop Time: 1218  OT Total Time (min): 26 min     Billable Minutes:Therapeutic Activity 10  Therapeutic Exercise 16    OT/MARYA: OT          8/8/2024

## 2024-08-08 NOTE — PROGRESS NOTES
08/08/24 0455   Vital Signs   Resp 16   SpO2 96 %   BP (!) 182/84   BP Location Right arm   BP Method Automatic   Patient Position Lying     Pt bp elevated with no c/o pain or HA and no signs of distress. Nurse notified Jamey Zaidi MD. MD requested daily losartan to be given early. Will recheck BP.   BP recheck 165/74 pt showed no signs of distress. Call light within reach.

## 2024-08-08 NOTE — SUBJECTIVE & OBJECTIVE
Interval History: Ms. Barajas has no concerns this AM. She did have HTN this morning; Losartan given early to help lower BP.     Review of Systems   Constitutional:  Negative for chills, diaphoresis and fever.   Respiratory:  Positive for shortness of breath. Negative for cough.         Patient requires 2L oxygen at home;    Cardiovascular:  Positive for leg swelling. Negative for chest pain and palpitations.        Leg swelling improving   Gastrointestinal:  Negative for abdominal pain, constipation, diarrhea, nausea and vomiting.        Patient reports no abdominal pain and is able to tolerate meals without pain, nausea, or vomiting    Genitourinary:  Negative for difficulty urinating and dysuria.   Musculoskeletal:  Negative for arthralgias and myalgias.   Skin:  Negative for color change.   Neurological:  Negative for dizziness and headaches.     Objective:     Vital Signs (Most Recent):  Temp: 97.5 °F (36.4 °C) (08/08/24 0733)  Pulse: 73 (08/08/24 0836)  Resp: 18 (08/08/24 0836)  BP: (!) 162/66 (08/08/24 0733)  SpO2: (!) 93 % (08/08/24 0733) Vital Signs (24h Range):  Temp:  [97.5 °F (36.4 °C)-98 °F (36.7 °C)] 97.5 °F (36.4 °C)  Pulse:  [63-75] 73  Resp:  [16-18] 18  SpO2:  [93 %-96 %] 93 %  BP: (139-182)/(66-84) 162/66     Weight: 77.1 kg (169 lb 15.6 oz)  Body mass index is 29.18 kg/m².    Intake/Output Summary (Last 24 hours) at 8/8/2024 0854  Last data filed at 8/8/2024 0840  Gross per 24 hour   Intake 990 ml   Output 975 ml   Net 15 ml         Physical Exam  Constitutional:       General: She is not in acute distress.     Appearance: Normal appearance. She is not ill-appearing.   HENT:      Head: Normocephalic and atraumatic.   Eyes:      General: No scleral icterus.     Conjunctiva/sclera: Conjunctivae normal.   Cardiovascular:      Rate and Rhythm: Normal rate and regular rhythm.      Pulses: Normal pulses.      Heart sounds: Normal heart sounds.   Pulmonary:      Effort: Pulmonary effort is normal.       Breath sounds: Normal breath sounds.   Abdominal:      General: Abdomen is flat. Bowel sounds are normal. There is no distension.      Palpations: Abdomen is soft.      Tenderness: There is no abdominal tenderness. There is no rebound.      Comments: No tenderness today   Musculoskeletal:         General: Normal range of motion.      Cervical back: Normal range of motion and neck supple.      Right lower leg: Edema present.      Left lower leg: Edema present.      Comments: Edema has reduced from 3+ bilateral lower extremities to 1+ bilaterally        Skin:     General: Skin is warm and dry.      Coloration: Skin is not jaundiced.   Neurological:      Mental Status: She is alert and oriented to person, place, and time.   Psychiatric:         Mood and Affect: Mood normal.         Behavior: Behavior normal.             Significant Labs: All pertinent labs within the past 24 hours have been reviewed.    Significant Imaging: I have reviewed all pertinent imaging results/findings within the past 24 hours.

## 2024-08-08 NOTE — PLAN OF CARE
Discharge Plan A and Plan B have been determined by review of patient's clinical status, future medical and therapeutic needs, and coverage/benefits for post-acute care in coordination with multidisciplinary team members.    08/08/24 1233   Post-Acute Status   Post-Acute Authorization Placement   Post-Acute Placement Status Pending payor review/awaiting authorization (if required)   Coverage [employer] - [facility] [employer] MC[employer] ADV -   Discharge Plan   Discharge Plan A Skilled Nursing Facility   Discharge Plan B Home Health;Home with family     Kat ([facility]) notified MARINE that family currently completing consents. Auth remains pending. Kat requesting updated orders. MARINE requested that MD place orders. MARINE to send to Kat once placed.    Dc to SNF pending payor review.                              SULEIMAN Phillips, LMSW  [facility]  Case Management  Ext. [phone]

## 2024-08-08 NOTE — PT/OT/SLP PROGRESS
"Physical Therapy Treatment    Patient Name:  Misa Barajas   MRN:  [id number]    Recommendations:     Discharge Recommendations: Moderate Intensity Therapy  Discharge Equipment Recommendations: walker, rolling, grab bar  Barriers to discharge: None    Assessment:     Misa Barajas is a 78 y.o. female admitted with a medical diagnosis of Acute pancreatitis.  She presents with the following impairments/functional limitations: weakness, impaired endurance, impaired functional mobility, gait instability, impaired balance, impaired cardiopulmonary response to activity .Pt was agreeable to participate in skilled therapy session and perform OOB activities.Pt is improving at this time with Acute skilled PT and further care is recommend at this time to gain independence with ADL's and improve functional mobility prior to leaving inpatient setting.     Rehab Prognosis: Fair; patient would benefit from acute skilled PT services to address these deficits and reach maximum level of function.    Recent Surgery: Procedure(s) (LRB):  ERCP (ENDOSCOPIC RETROGRADE CHOLANGIOPANCREATOGRAPHY) (N/A) 9 Days Post-Op    Plan:     During this hospitalization, patient to be seen 4 x/week to address the identified rehab impairments via gait training, neuromuscular re-education, therapeutic activities, therapeutic exercises and progress toward the following goals:    Plan of Care Expires:  09/03/24    Subjective     Chief Complaint: "I want to sit up today."  Patient/Family Comments/goals: none verbalized  Pain/Comfort:  Pain Rating 1: 0/10      Objective:     Communicated with nursing prior to session.  Patient found supine with oxygen, telemetry upon PT entry to room.     General Precautions: Standard, fall  Orthopedic Precautions: N/A  Braces: N/A  Respiratory Status: Room air     Functional Mobility:  Bed Mobility:     Rolling Left:  minimum assistance  Scooting: minimum assistance  Supine to Sit: minimum assistance  Transfers:     Sit to " Stand:  maximal assistance with rolling walker  Bed to Chair: moderate assistance with  rolling walker  using  Stand Pivot      AM-PAC 6 CLICK MOBILITY  Turning over in bed (including adjusting bedclothes, sheets and blankets)?: 4  Sitting down on and standing up from a chair with arms (e.g., wheelchair, bedside commode, etc.): 3  Moving from lying on back to sitting on the side of the bed?: 3  Moving to and from a bed to a chair (including a wheelchair)?: 3  Need to walk in hospital room?: 3  Climbing 3-5 steps with a railing?: 1  Basic Mobility Total Score: 17       Treatment & Education:  Patient provided with daily orientation and goals of this PT session. They were educated to call for assistance and to transfer with hospital staff only.  Also, pt was educated on the effects of prolonged immobility and the importance of performing OOB activity and exercises to promote healing and reduce recovery time.    Patient left up in chair with all lines intact and call button in reach..    GOALS:   Multidisciplinary Problems       Physical Therapy Goals          Problem: Physical Therapy    Goal Priority Disciplines Outcome Goal Variances Interventions   Physical Therapy Goal     PT, PT/OT Progressing     Description: Goals to be met by:      Patient will increase functional independence with mobility by performin. Supine to sit with Modified [place]  2. Sit to supine with Modified [place]  3. Sit to stand transfer with Stand-by Assistance  4. Bed to chair transfer with Stand-by Assistance using LRAD  5. Gait  x 100 feet with Stand-by Assistance using LRAD.                          Time Tracking:     PT Received On: 24  PT Start Time: 1029     PT Stop Time: 1045  PT Total Time (min): 16 min     Billable Minutes: Therapeutic Activity 15    Treatment Type: Treatment  PT/PTA: PTA     Number of PTA visits since last PT visit: 2024

## 2024-08-09 ENCOUNTER — DOCUMENT SCAN (OUTPATIENT)
Dept: HOME HEALTH SERVICES | Facility: HOSPITAL | Age: 79
End: 2024-08-09
Payer: MEDICARE

## 2024-08-09 PROBLEM — B37.9 YEAST INFECTION: Status: ACTIVE | Noted: 2024-08-09

## 2024-08-09 PROBLEM — K85.90 ACUTE PANCREATITIS: Status: RESOLVED | Noted: 2024-07-27 | Resolved: 2024-08-09

## 2024-08-09 LAB
ALBUMIN SERPL BCP-MCNC: 1.8 G/DL (ref 3.5–5.2)
ALP SERPL-CCNC: 208 U/L (ref 55–135)
ALT SERPL W/O P-5'-P-CCNC: 29 U/L (ref 10–44)
ANION GAP SERPL CALC-SCNC: 7 MMOL/L (ref 8–16)
AST SERPL-CCNC: 38 U/L (ref 10–40)
BASOPHILS # BLD AUTO: 0.08 K/UL (ref 0–0.2)
BASOPHILS NFR BLD: 0.7 % (ref 0–1.9)
BILIRUB SERPL-MCNC: 0.9 MG/DL (ref 0.1–1)
BUN SERPL-MCNC: 15 MG/DL (ref 8–23)
CALCIUM SERPL-MCNC: 9.6 MG/DL (ref 8.7–10.5)
CHLORIDE SERPL-SCNC: 103 MMOL/L (ref 95–110)
CO2 SERPL-SCNC: 29 MMOL/L (ref 23–29)
CREAT SERPL-MCNC: 0.9 MG/DL (ref 0.5–1.4)
DIFFERENTIAL METHOD BLD: ABNORMAL
EOSINOPHIL # BLD AUTO: 0.6 K/UL (ref 0–0.5)
EOSINOPHIL NFR BLD: 5.1 % (ref 0–8)
ERYTHROCYTE [DISTWIDTH] IN BLOOD BY AUTOMATED COUNT: 14.3 % (ref 11.5–14.5)
EST. GFR  (NO RACE VARIABLE): >60 ML/MIN/1.73 M^2
GLUCOSE SERPL-MCNC: 95 MG/DL (ref 70–110)
HCT VFR BLD AUTO: 32.7 % (ref 37–48.5)
HGB BLD-MCNC: 10.6 G/DL (ref 12–16)
IMM GRANULOCYTES # BLD AUTO: 0.13 K/UL (ref 0–0.04)
IMM GRANULOCYTES NFR BLD AUTO: 1.1 % (ref 0–0.5)
LYMPHOCYTES # BLD AUTO: 2.2 K/UL (ref 1–4.8)
LYMPHOCYTES NFR BLD: 18.2 % (ref 18–48)
MAGNESIUM SERPL-MCNC: 1.8 MG/DL (ref 1.6–2.6)
MCH RBC QN AUTO: 28.9 PG (ref 27–31)
MCHC RBC AUTO-ENTMCNC: 32.4 G/DL (ref 32–36)
MCV RBC AUTO: 89 FL (ref 82–98)
MONOCYTES # BLD AUTO: 1.2 K/UL (ref 0.3–1)
MONOCYTES NFR BLD: 9.7 % (ref 4–15)
NEUTROPHILS # BLD AUTO: 7.9 K/UL (ref 1.8–7.7)
NEUTROPHILS NFR BLD: 65.2 % (ref 38–73)
NRBC BLD-RTO: 0 /100 WBC
PLATELET # BLD AUTO: 225 K/UL (ref 150–450)
PMV BLD AUTO: 9.7 FL (ref 9.2–12.9)
POTASSIUM SERPL-SCNC: 4 MMOL/L (ref 3.5–5.1)
PROT SERPL-MCNC: 5.4 G/DL (ref 6–8.4)
RBC # BLD AUTO: 3.67 M/UL (ref 4–5.4)
SODIUM SERPL-SCNC: 139 MMOL/L (ref 136–145)
WBC # BLD AUTO: 12.03 K/UL (ref 3.9–12.7)

## 2024-08-09 PROCEDURE — 83735 ASSAY OF MAGNESIUM: CPT | Performed by: STUDENT IN AN ORGANIZED HEALTH CARE EDUCATION/TRAINING PROGRAM

## 2024-08-09 PROCEDURE — 97530 THERAPEUTIC ACTIVITIES: CPT

## 2024-08-09 PROCEDURE — 25000003 PHARM REV CODE 250

## 2024-08-09 PROCEDURE — 85025 COMPLETE CBC W/AUTO DIFF WBC: CPT

## 2024-08-09 PROCEDURE — 36415 COLL VENOUS BLD VENIPUNCTURE: CPT

## 2024-08-09 PROCEDURE — 21400001 HC TELEMETRY ROOM

## 2024-08-09 PROCEDURE — 25000003 PHARM REV CODE 250: Performed by: STUDENT IN AN ORGANIZED HEALTH CARE EDUCATION/TRAINING PROGRAM

## 2024-08-09 PROCEDURE — 97112 NEUROMUSCULAR REEDUCATION: CPT

## 2024-08-09 PROCEDURE — 97116 GAIT TRAINING THERAPY: CPT

## 2024-08-09 PROCEDURE — 97535 SELF CARE MNGMENT TRAINING: CPT

## 2024-08-09 PROCEDURE — 80053 COMPREHEN METABOLIC PANEL: CPT | Performed by: STUDENT IN AN ORGANIZED HEALTH CARE EDUCATION/TRAINING PROGRAM

## 2024-08-09 RX ORDER — FLUCONAZOLE 150 MG/1
150 TABLET ORAL DAILY
Status: COMPLETED | OUTPATIENT
Start: 2024-08-09 | End: 2024-08-09

## 2024-08-09 RX ORDER — DOXYLAMINE SUCCINATE 25 MG
TABLET ORAL 2 TIMES DAILY
Qty: 14 G | Refills: 0 | Status: SHIPPED | OUTPATIENT
Start: 2024-08-09 | End: 2024-08-09 | Stop reason: HOSPADM

## 2024-08-09 RX ADMIN — MICONAZOLE NITRATE: 20 CREAM TOPICAL at 08:08

## 2024-08-09 RX ADMIN — MICONAZOLE NITRATE: 20 CREAM TOPICAL at 09:08

## 2024-08-09 RX ADMIN — LOSARTAN POTASSIUM 50 MG: 50 TABLET, FILM COATED ORAL at 08:08

## 2024-08-09 RX ADMIN — METOPROLOL SUCCINATE 100 MG: 50 TABLET, EXTENDED RELEASE ORAL at 08:08

## 2024-08-09 RX ADMIN — FLUTICASONE FUROATE AND VILANTEROL TRIFENATATE 1 PUFF: 100; 25 POWDER RESPIRATORY (INHALATION) at 08:08

## 2024-08-09 RX ADMIN — FUROSEMIDE 40 MG: 20 TABLET ORAL at 08:08

## 2024-08-09 RX ADMIN — APIXABAN 5 MG: 5 TABLET, FILM COATED ORAL at 08:08

## 2024-08-09 RX ADMIN — OXYCODONE HYDROCHLORIDE 5 MG: 5 TABLET ORAL at 12:08

## 2024-08-09 RX ADMIN — FLUCONAZOLE 150 MG: 150 TABLET ORAL at 05:08

## 2024-08-09 RX ADMIN — APIXABAN 5 MG: 5 TABLET, FILM COATED ORAL at 09:08

## 2024-08-09 NOTE — SUBJECTIVE & OBJECTIVE
Interval History: No events, no changes. Pending placement.    Review of Systems   Constitutional:  Negative for chills, diaphoresis and fever.   Respiratory:  Positive for shortness of breath. Negative for cough.         Patient requires 2L oxygen at home;    Cardiovascular:  Positive for leg swelling. Negative for chest pain and palpitations.        Leg swelling improving   Gastrointestinal:  Negative for abdominal pain, constipation, diarrhea, nausea and vomiting.        Patient reports no abdominal pain and is able to tolerate meals without pain, nausea, or vomiting    Genitourinary:  Negative for difficulty urinating and dysuria.   Musculoskeletal:  Negative for arthralgias and myalgias.   Skin:  Negative for color change.   Neurological:  Negative for dizziness and headaches.     Objective:     Vital Signs (Most Recent):  Temp: 97.5 °F (36.4 °C) (08/09/24 0747)  Pulse: 80 (08/09/24 0855)  Resp: 18 (08/09/24 0855)  BP: (!) 169/79 (08/09/24 0747)  SpO2: (!) 94 % (08/09/24 0747) Vital Signs (24h Range):  Temp:  [97.4 °F (36.3 °C)-97.6 °F (36.4 °C)] 97.5 °F (36.4 °C)  Pulse:  [64-80] 80  Resp:  [17-18] 18  SpO2:  [92 %-94 %] 94 %  BP: (144-169)/(69-79) 169/79     Weight: 77.1 kg (169 lb 15.6 oz)  Body mass index is 29.18 kg/m².    Intake/Output Summary (Last 24 hours) at 8/9/2024 1053  Last data filed at 8/8/2024 1415  Gross per 24 hour   Intake 350 ml   Output --   Net 350 ml         Physical Exam  Constitutional:       General: She is not in acute distress.     Appearance: Normal appearance. She is not ill-appearing.   HENT:      Head: Normocephalic and atraumatic.   Eyes:      General: No scleral icterus.     Conjunctiva/sclera: Conjunctivae normal.   Cardiovascular:      Rate and Rhythm: Normal rate and regular rhythm.      Pulses: Normal pulses.      Heart sounds: Normal heart sounds.   Pulmonary:      Effort: Pulmonary effort is normal.      Breath sounds: Normal breath sounds.   Abdominal:      General:  Abdomen is flat. Bowel sounds are normal. There is no distension.      Palpations: Abdomen is soft.      Tenderness: There is no abdominal tenderness. There is no rebound.      Comments: No tenderness today   Musculoskeletal:         General: Normal range of motion.      Cervical back: Normal range of motion and neck supple.      Right lower leg: Edema present.      Left lower leg: Edema present.      Comments: Edema improving. Around 1+ now.        Skin:     General: Skin is warm and dry.      Coloration: Skin is not jaundiced.   Neurological:      Mental Status: She is alert and oriented to person, place, and time.   Psychiatric:         Mood and Affect: Mood normal.         Behavior: Behavior normal.             Significant Labs: All pertinent labs within the past 24 hours have been reviewed.    Significant Imaging: I have reviewed all pertinent imaging results/findings within the past 24 hours.

## 2024-08-09 NOTE — PT/OT/SLP PROGRESS
"Physical Therapy Co-Treatment    Patient Name:  Misa Barajas   MRN:  [id number]    Recommendations:     Discharge Recommendations: Moderate Intensity Therapy  Discharge Equipment Recommendations: grab bar, walker, rolling  Barriers to discharge: Pt currently requiring increased level of assistance with mobility    Assessment:     Misa Barajas is a 78 y.o. female admitted with a medical diagnosis of Acute pancreatitis.  She presents with the following impairments/functional limitations: weakness, impaired endurance, impaired functional mobility, gait instability, impaired balance, decreased lower extremity function, decreased coordination, decreased upper extremity function, impaired cardiopulmonary response to activity.    Pleasant and cooperative. Pt demonstrated improved activity tolerance and amb distance of 8' RW with 1 person for safety and chair follow. Attempted to amb in hallway for second bout, however upon standing pt c/o lightheadedness. Pt was visibly fatigued at end of session. Pt continues to benefit from skilled PT services while in house in order to address the aforementioned deficits.      Rehab Prognosis: Good; patient would benefit from acute skilled PT services to address these deficits and reach maximum level of function.    Recent Surgery: Procedure(s) (LRB):  ERCP (ENDOSCOPIC RETROGRADE CHOLANGIOPANCREATOGRAPHY) (N/A) 10 Days Post-Op    Plan:     During this hospitalization, patient to be seen 4 x/week to address the identified rehab impairments via gait training, therapeutic activities, therapeutic exercises, neuromuscular re-education and progress toward the following goals:    Plan of Care Expires:  09/03/24    Subjective     "Wow this [place]" [place] [id number]  Pain/Comfort:  Pain Rating 1: 0/10      Objective:     Communicated with RN prior to session.  Patient found HOB elevated with oxygen, telemetry upon PT entry to room.     General Precautions: Standard, " fall  Orthopedic Precautions: N/A  Braces: N/A  Respiratory Status: Nasal cannula, flow 2 L/min     Functional Mobility:  Bed Mobility:     Scooting: stand by assistance  Supine to Sit: minimum assistance  Transfers:     Sit to Stand:    From elevated bed: minimum assistance with rolling walker  From bedside chair: moderate assistance x2 person with RW  Bed to Chair: contact guard assistance with  rolling walker  using  Step Transfer  Gait: pt amb 8' RW CGA with chair follow and presented with head down, decreased faith, B shortened steps  Balance:   Good sitting balance  Fair standing balance      AM-PAC 6 CLICK MOBILITY  Turning over in bed (including adjusting bedclothes, sheets and blankets)?: 4  Sitting down on and standing up from a chair with arms (e.g., wheelchair, bedside commode, etc.): 3  Moving from lying on back to sitting on the side of the bed?: 3  Moving to and from a bed to a chair (including a wheelchair)?: 3  Need to walk in hospital room?: 3  Climbing 3-5 steps with a railing?: 1  Basic Mobility Total Score: 17       Treatment & Education:  Discussed sitting upright in chair >1hr, pt agreeable  Tolerated standing x2 min for [place] care    Educated pt on PT role/POC  Educated pt on importance of OOB activity and daily ambulation   Pt educated on proper body mechanics, safety techniques, and energy conservation with PT facilitation and cueing throughout session   Pt verbalized understanding      Patient left up in chair with all lines intact, call button in reach, RN notified, and OT present..    GOALS:   Multidisciplinary Problems       Physical Therapy Goals          Problem: Physical Therapy    Goal Priority Disciplines Outcome Goal Variances Interventions   Physical Therapy Goal     PT, PT/OT Progressing     Description: Goals to be met by:      Patient will increase functional independence with mobility by performin. Supine to sit with Modified [place]  2. Sit to supine with  Modified [place]  3. Sit to stand transfer with Stand-by Assistance  4. Bed to chair transfer with Stand-by Assistance using LRAD  5. Gait  x 100 feet with Stand-by Assistance using LRAD.                          Time Tracking:     PT Received On: 08/09/24  PT Start Time: 1304     PT Stop Time: 1330  PT Total Time (min): 26 min     Billable Minutes: Gait Training 16 and Neuromuscular Re-education 10    Co-treatment performed due to patient's multiple deficits requiring two skilled therapists to appropriately and safely assess patient's strength and endurance while facilitating functional tasks in addition to accommodating for patient's activity tolerance.     Treatment Type: Treatment  PT/PTA: PT     Number of PTA visits since last PT visit: 0     08/09/2024

## 2024-08-09 NOTE — PROGRESS NOTES
[facility] ([address] Medicine  Progress Note    Patient Name: Misa Barajas  MRN: [id number]  Patient Class: IP- Inpatient   Admission Date: 7/26/2024  Length of Stay: 12 days  Attending Physician: Jamey Raya, *  Primary Care Provider: Celia Carlisle MD        Subjective:     Principal Problem:Acute pancreatitis        HPI:  Misa Barajas is a 78F with a PMHx of HTN, HLD, HFpEF, COPD (on 2L NC baseline O2 sat 92%) and pAF who presented to [facility] 7/26 for acute onset abdominal pain that radiated to her back. States she has RUQ and epigastric pain that started acutely this afternoon. She rates her pain as 10/10 intensity, sharp and non-pleuritic in nature. She reports a past history of nephrolithiasis but states that this pain is different. Pain is not post-prandial in nature and explains that this is differen than her past episodes of nephrolithiasis. SOB started at around thesame time and she normally is on 2L/min of at home oxygen due to her COPD. She states that she was watching TV when the pain suddenly started. She denies nausea, vomiting, fever, chills, or LUTS including hamturia or dysuria. Recent admission 7/12-7/18 for pasturella bactermia d/t cat bite, completed 2wk CTX (EOT 7/26, midline removed 7/26). No recent changes in appetite reported. She also denies diarrhea, constipation.     In ED, Slightly hypertensive 172/83, no tachycardia, on 2L NC saturating 98%, afebrile. CBC with slightly worsened anemia (Hb 8.9, baseline seems approx 10), no leukocytosis. CMP with hypokalemia K 3.4, hypercalcemia (corrected 11.5), Alb 2.6, LFT elevation including T. Bili 1.1, AST//54, . Lipase >3000. , troponin normal. CTA (obtained for aortic dissection r/o) showed cholelithiasis Distended BG with pericholecystic fluid and cholelithiasis. No intrahepatic or extrahepatic biliary ductal dilatation. CTA showed cholelithiasis so we obtained a right upper  quadrant ultrasound as well that confirmed the cholelithiasis and could not necessarily rule out cholecystitis. Gave CTX and 1L IVF.     Overview/Hospital Course:  Patient admitted for pancreatitis 2/2 likely blocked duct. CTA Abdomen/pelvis (7/26/24) revealed non-obstruction renal calculi, cholelithiasis, possible early cholecystitis, no signs of choledocholithiasis, and stranding in the pancreas suggestive of pancreatits. MRCP on 7/28/24 showed cholelithiasis and pancreatitis without choledocolithiasis or cholecystitis. General surgery consulted and recommends a HIDA to definitively rule out Cholecystitis. If HIDA is negative, general surgery recommends an ERCP with sphincterotomy instead of a cholecystectomy due to co-morbidities increasing surgical risk. HIDA scan was negative for cholecystitis. Patient underwent an ERCP with sphincterotomy 7/30/24. Pain controlled, PT/OT with recommendations for moderate intensity therapy. SW aware. Pain has greatly improved and there there is no longer reported tenderness. Patient with persistent leukocytosis, given patient AF, physical exam unrevealing for source of infection, likely stress induced but will consider referral to hematology outpatient to make sure no other underlying issues are present.     Interval History: Ms. Barajas has no concerns this AM. She did have HTN this morning; Losartan given early to help lower BP.     Review of Systems   Constitutional:  Negative for chills, diaphoresis and fever.   Respiratory:  Positive for shortness of breath. Negative for cough.         Patient requires 2L oxygen at home;    Cardiovascular:  Positive for leg swelling. Negative for chest pain and palpitations.        Leg swelling improving   Gastrointestinal:  Negative for abdominal pain, constipation, diarrhea, nausea and vomiting.        Patient reports no abdominal pain and is able to tolerate meals without pain, nausea, or vomiting    Genitourinary:  Negative for  difficulty urinating and dysuria.   Musculoskeletal:  Negative for arthralgias and myalgias.   Skin:  Negative for color change.   Neurological:  Negative for dizziness and headaches.     Objective:     Vital Signs (Most Recent):  Temp: 97.5 °F (36.4 °C) (08/08/24 0733)  Pulse: 73 (08/08/24 0836)  Resp: 18 (08/08/24 0836)  BP: (!) 162/66 (08/08/24 0733)  SpO2: (!) 93 % (08/08/24 0733) Vital Signs (24h Range):  Temp:  [97.5 °F (36.4 °C)-98 °F (36.7 °C)] 97.5 °F (36.4 °C)  Pulse:  [63-75] 73  Resp:  [16-18] 18  SpO2:  [93 %-96 %] 93 %  BP: (139-182)/(66-84) 162/66     Weight: 77.1 kg (169 lb 15.6 oz)  Body mass index is 29.18 kg/m².    Intake/Output Summary (Last 24 hours) at 8/8/2024 0854  Last data filed at 8/8/2024 0840  Gross per 24 hour   Intake 990 ml   Output 975 ml   Net 15 ml         Physical Exam  Constitutional:       General: She is not in acute distress.     Appearance: Normal appearance. She is not ill-appearing.   HENT:      Head: Normocephalic and atraumatic.   Eyes:      General: No scleral icterus.     Conjunctiva/sclera: Conjunctivae normal.   Cardiovascular:      Rate and Rhythm: Normal rate and regular rhythm.      Pulses: Normal pulses.      Heart sounds: Normal heart sounds.   Pulmonary:      Effort: Pulmonary effort is normal.      Breath sounds: Normal breath sounds.   Abdominal:      General: Abdomen is flat. Bowel sounds are normal. There is no distension.      Palpations: Abdomen is soft.      Tenderness: There is no abdominal tenderness. There is no rebound.      Comments: No tenderness today   Musculoskeletal:         General: Normal range of motion.      Cervical back: Normal range of motion and neck supple.      Right lower leg: Edema present.      Left lower leg: Edema present.      Comments: Edema has reduced from 3+ bilateral lower extremities to 1+ bilaterally        Skin:     General: Skin is warm and dry.      Coloration: Skin is not jaundiced.   Neurological:      Mental Status:  She is alert and oriented to person, place, and time.   Psychiatric:         Mood and Affect: Mood normal.         Behavior: Behavior normal.             Significant Labs: All pertinent labs within the past 24 hours have been reviewed.    Significant Imaging: I have reviewed all pertinent imaging results/findings within the past 24 hours.    Assessment/Plan:      * Acute pancreatitis  Sudden onset abdominal pain not a/w eating c/f gallstone pancreatitis (given cholelithiasis seen on CTA and US). No fever, chills, or other s/s infection to suggest cholangitis. Does have elevated T bili and AST/ALT. Severe abdominal pain. No nausea or vomiting. No acute alcohol intake. Slight hypercalcemia of unknown etiology, however not profound enough to explain acute pancreatitis. Lipase >3000. CT AP with peripancreatic stranding and edema, greatest about the head and neck, favored to be related to pancreatitis. Location of pain plus CT findings and lab findings do not suggest nephrolithiasis as etiology.     - AES consulted given LFT elevation, appreciate recommendations  - MRCP to better delineate biliary obstruction  - cautious with fluids given CHF (no signs of decompensation, got 1L in ED)  - analgesic therapy  -General surgery recommends HIDA to definitively r/o cholecystitis  -If HIDA negative, General Surgery recommends ERCP w/ sphincterotomy due to co-morbidities increasing surgical risk     -HIDA was negative for cholecystitis;     ERCP with sphincterotomy performed by AES; patient given zosyn after procedure for 3 days    Patient pain has greatly improved; only mild tenderness present; handling meals without issue    Liver enzymes decreasing     Patient [facility]      Leukocytosis  Persistent, physical exam unrevealing, AF  Likely stress induced      COPD (chronic obstructive pulmonary disease)  Patient's COPD is controlled currently.  Patient is currently off COPD Pathway. Continue scheduled inhalers  Supplemental oxygen and monitor respiratory status closely.     No wheezing, no s/s respiratory illness. On home 2L NC.     - No Brezztri on formulary, started on breo    Chronic diastolic heart failure  No signs of decompensation. Stable RLE edema. No orthopnea, no rales.    - Continue home metoprolol        Paroxysmal atrial fibrillation  Patient with Paroxysmal (<7 days) atrial fibrillation which is controlled currently with Beta Blocker. Patient is currently in sinus rhythm.[id number] Score: 3. Anticoagulation not indicated due to possible procedure .    CKD (chronic kidney disease) stage 3, GFR 30-59 ml/min  Creatine stable for now. BMP reviewed- noted Estimated Creatinine Clearance: 50.1 mL/min (based on SCr of 0.9 mg/dL). according to latest data. Based on current GFR, CKD stage is stage 3 - GFR 30-59.  Monitor UOP and serial BMP and adjust therapy as needed. Renally dose meds. Avoid nephrotoxic medications and procedures.    Renal lab values as of (8/5/24) are within normal range    Plan:   - Strict I&Os and daily weights   - Trend Cr  - Strict I&Os  - Avoid nephrotoxic agents (NSAIDs, ACEi/Arbs, aminoglycoside abx, zozyn, amphotericin, tenofovir)  - Renally adjust medications (metformin, gabapentin, cefepime, morphine)    Secondary hyperparathyroidism of renal origin  Causing slight hypercalcemia.     - Check calcitriol; wnl  - CTM CMP      Essential hypertension  Chronic, uncontrolled. Latest blood pressure and vitals reviewed-     Temp:  [97.5 °F (36.4 °C)-98.3 °F (36.8 °C)]   Pulse:  [66-81]   Resp:  [16-18]   BP: ()/(64-82)   SpO2:  [92 %-95 %] .   Home meds for hypertension were reviewed and noted below.   Hypertension Medications               furosemide (LASIX) 40 MG tablet Take 1 tablet (40 mg total) by mouth once daily.    metoprolol succinate (TOPROL-XL) 100 MG 24 hr tablet Take 1 tablet (100 mg total) by mouth once daily.          While in the hospital, will manage blood pressure as  follows; Continue home antihypertensive regimen and start Losartan      VTE Risk Mitigation (From admission, onward)           Ordered     apixaban tablet 5 mg  2 times daily         08/02/24 1910     Reason for No Pharmacological VTE Prophylaxis  Once        Comments: Anticipate possible procerdure   Question:  Reasons:  Answer:  Physician Provided (leave comment)    07/27/24 0430     IP VTE HIGH RISK PATIENT  Once         07/27/24 0430     Place sequential compression device  Until discontinued         07/27/24 0430                    Discharge Planning   NOHEMI: 8/8/2024     Code Status: Full Code   Is the patient medically ready for discharge?:     Reason for patient still in hospital (select all that apply): Pending disposition  Discharge Plan A: Skilled Nursing Facility                  Karey Liang MD  Department of Hospital Medicine   [facility] ([place])

## 2024-08-09 NOTE — SUBJECTIVE & OBJECTIVE
Interval History: Patient has signs concerning for a yeast infection around her groin, buttocks, and belly. Giving Miconazole cream BID. Gave one dose of 150 mg Fluconazole. Awaiting transportation to [facility].     Review of Systems   Constitutional:  Negative for chills, diaphoresis and fever.   Respiratory:  Positive for shortness of breath. Negative for cough.         Patient requires 2L oxygen at home;    Cardiovascular:  Positive for leg swelling. Negative for chest pain and palpitations.        Leg swelling improving   Gastrointestinal:  Negative for abdominal pain, constipation, diarrhea, nausea and vomiting.        Patient reports no abdominal pain and is able to tolerate meals without pain, nausea, or vomiting    Genitourinary:  Negative for difficulty urinating and dysuria.   Musculoskeletal:  Negative for arthralgias and myalgias.   Skin:  Negative for color change.   Neurological:  Negative for dizziness and headaches.     Objective:     Vital Signs (Most Recent):  Temp: 98.3 °F (36.8 °C) (08/09/24 1651)  Pulse: 67 (08/09/24 1651)  Resp: 17 (08/09/24 1651)  BP: 123/65 (08/09/24 1651)  SpO2: 96 % (08/09/24 1651) Vital Signs (24h Range):  Temp:  [97.4 °F (36.3 °C)-98.3 °F (36.8 °C)] 98.3 °F (36.8 °C)  Pulse:  [64-80] 67  Resp:  [17-18] 17  SpO2:  [92 %-96 %] 96 %  BP: (123-169)/(65-79) 123/65     Weight: 77.1 kg (169 lb 15.6 oz)  Body mass index is 29.18 kg/m².  No intake or output data in the 24 hours ending 08/09/24 1743      Physical Exam  Constitutional:       General: She is not in acute distress.     Appearance: Normal appearance. She is not ill-appearing.   HENT:      Head: Normocephalic and atraumatic.   Eyes:      General: No scleral icterus.     Conjunctiva/sclera: Conjunctivae normal.   Cardiovascular:      Rate and Rhythm: Normal rate and regular rhythm.      Pulses: Normal pulses.      Heart sounds: Normal heart sounds.   Pulmonary:      Effort: Pulmonary effort is normal.      Breath sounds:  Normal breath sounds.   Abdominal:      General: Abdomen is flat. Bowel sounds are normal. There is no distension.      Palpations: Abdomen is soft.      Tenderness: There is no abdominal tenderness. There is no rebound.      Comments: No tenderness today   Musculoskeletal:         General: Normal range of motion.      Cervical back: Normal range of motion and neck supple.      Right lower leg: Edema present.      Left lower leg: Edema present.      Comments: Edema improving. Around 1+ now.        Skin:     General: Skin is warm and dry.      Coloration: Skin is not jaundiced.   Neurological:      Mental Status: She is alert and oriented to person, place, and time.   Psychiatric:         Mood and Affect: Mood normal.         Behavior: Behavior normal.             Significant Labs: All pertinent labs within the past 24 hours have been reviewed.    Significant Imaging: I have reviewed all pertinent imaging results/findings within the past 24 hours.

## 2024-08-09 NOTE — CONSULTS
[facility] ([place] [address])    Wound Care     Patient Name:  Misa Barajas  MRN:  [id number]  Date: 8/9/2024  Diagnosis: Acute pancreatitis     History:  Past Medical History:   Diagnosis Date    Acute biliary pancreatitis 7/27/2024    Acute hypoxemic respiratory failure 6/26/2021    Acute superficial venous thrombosis of lower extremity, bilateral 1/25/2022    Anemia 7/12/2024    Aortic atherosclerosis     Arthritis     knee joint pain    Asthma-COPD overlap syndrome 8/22/2022    Breast cancer 2002    left breast & lymph nodes-s/p sx with chemo    Bronchitis     with flu-2/2014    Cataracts, bilateral     Chronic anticoagulation 5/4/2022    Chronic diastolic heart failure 12/24/2019    Chronic kidney disease, stage 3     Class 1 obesity due to excess calories with serious comorbidity and body mass index (BMI) of 30.0 to 30.9 in adult 5/16/2024    History of chemotherapy     last treatment 12/2002 (had 8 treatments)    Hyperlipidemia 11/20/2016    Hypertension     Lymphedema of both lower extremities 1/25/2022    Nephrolithiasis 6/2/2014    Osteoporosis     Paroxysmal atrial fibrillation 6/7/2021    Renal calculi     hematuria    Renal osteodystrophy 1/14/2016    Venous stasis dermatitis of both lower extremities 1/25/2022    Vitamin D insufficiency      Social History     Socioeconomic History    Marital status:    Tobacco Use    Smoking status: Former     Current packs/day: 0.00     Average packs/day: 1 pack/day for 50.0 years (50.0 ttl pk-yrs)     Types: Cigarettes     Start date: 1960     Quit date: 5/20/2009     Years since quitting: 15.2    Smokeless tobacco: Former   Substance and Sexual Activity    Alcohol use: No    Drug use: No    Sexual activity: Not Currently     Partners: Male     Birth control/protection: Partner-Vasectomy     Social Determinants of Health     Financial Resource Strain: Low Risk  (7/27/2024)    Overall Financial Resource Strain (CARDIA)     Difficulty of Paying Living  Expenses: Not very hard   Food Insecurity: No Food Insecurity (7/27/2024)    Hunger Vital Sign     Worried About Running Out of Food in the Last Year: Never true     Ran Out of Food in the Last Year: Never true   Transportation Needs: No Transportation Needs (7/27/2024)    TRANSPORTATION NEEDS     Transportation : No   Physical Activity: Sufficiently Active (7/13/2024)    Exercise Vital Sign     Days of Exercise per Week: 7 days     Minutes of Exercise per Session: 30 min   Stress: No Stress Concern Present (7/27/2024)    [facility] of Occupational Health - Occupational Stress Questionnaire     Feeling of Stress : Only a little   Recent Concern: Stress - Stress Concern Present (7/13/2024)    [facility] of Occupational Health - Occupational Stress Questionnaire     Feeling of Stress : To some extent   Housing Stability: Low Risk  (7/27/2024)    Housing Stability Vital Sign     Unable to Pay for Housing in the Last Year: No     Homeless in the Last Year: No     Precautions:  Allergies as of 07/26/2024 - Reviewed 07/26/2024   Allergen Reaction Noted    Adhesive tape-silicones  03/04/2024    Adhesive Rash 05/30/2014       WO Assessment Details / Treatment:    Patient seen for wound care: New Consult   Chart reviewed for this encounter.   Labs:   WBC (K/uL)   Date Value   08/09/2024 12.03   08/08/2024 13.63 (H)     Glucose (mg/dL)   Date Value   08/09/2024 95   08/08/2024 96     Albumin (g/dL)   Date Value   08/09/2024 1.8 (L)   08/08/2024 1.6 (L)     Isaiah Score: 19    Narrative:  Pt seen for WC consultation / follow-up and agreed to assessment  Chart reviewed for this encounter.   See Flow Sheet for additional documentation and media.    PCT saw WC Nurse and asked to please assess this pt, consult placed this AM and was not yet assigned to a WC team member.   Pt in bed and able to independently turn for assessment, buttocks appear to be fungal / IAD.   Buttocks, bilateral groin and fold under right  "breast all appear to be fungal in appearance, pt stated she has had this >1 month.   All areas red, excoriated, pt stated "itchy"  Applied Miconazole ointment to all areas.     RECOMMENDATIONS:  Bedside nurse assess for acute changes (purulence, increased redness/swelling, increased drainage, malodor, increased pain, pallor, necrosis) please contact physician on any acute changes.    Immerse ordered.   Systemic medication to treat. Secure chat to MD   Apply Miconazole ointment BID and PRN     Discussed POC with patient and primary nurse.   See EMR for orders & patient education.     Discussed nutrition and the role of protein in wound healing with the patient. Instructed patient to optimize protein for wound healing.     Bedside nursing to continue care & monitoring.  Bedside nursing to maintain pressure injury prevention interventions    Thank you for the consult. Wound Care will continue to follow.       08/09/24 1330   WOCN Assessment   WOCN Total Time (mins) 30   Visit Date 08/09/24   Visit Time 1330   Consult Type New   WOCN Speciality Wound   Wound moisture;At risk for pressure Injury;yeast   Intervention chart review;assessed;applied;orders   Teaching on-going;complication        Wound 05/08/24 0030 Moisture associated dermatitis Buttocks   Date First Assessed/Time First Assessed: 05/08/24 0030   Primary Wound Type: (c) Moisture associated dermatitis  Location: Buttocks   Wound Image         Wound 05/08/24 0028 Intertrigo lower Abdomen   Date First Assessed/Time First Assessed: 05/08/24 0028   Primary Wound Type: Intertrigo  Orientation: lower  Location: Abdomen   Wound Image         Wound 08/09/24 Moisture associated dermatitis Right distal Breast   Date First Assessed: 08/09/24   Present on Original Admission: Yes  Primary Wound Type: Moisture associated dermatitis  Side: Right  Orientation: distal  Location: Breast   Wound Image         Wound 08/09/24 Moisture associated dermatitis Right Groin   Date First " Assessed: 08/09/24   Present on Original Admission: Yes  Primary Wound Type: Moisture associated dermatitis  Side: Right  Location: Groin   Wound Image

## 2024-08-09 NOTE — PROGRESS NOTES
[facility] ([address] Medicine  Progress Note    Patient Name: Misa Barajas  MRN: [id number]  Patient Class: IP- Inpatient   Admission Date: 7/26/2024  Length of Stay: 13 days  Attending Physician: Jamey Raya, *  Primary Care Provider: Celia Carlisle MD        Subjective:     Principal Problem:Acute pancreatitis        HPI:  Misa Barajas is a 78F with a PMHx of HTN, HLD, HFpEF, COPD (on 2L NC baseline O2 sat 92%) and pAF who [place] [facility] [place] acute onset abdominal pain that radiated to her back. States she has RUQ and epigastric pain that started acutely this afternoon. She rates her pain as 10/10 intensity, sharp and non-pleuritic in nature. She reports a past history of nephrolithiasis but states that this pain is different. Pain is not post-prandial in nature and explains that this is differen than her past episodes of nephrolithiasis. SOB started at around thesame time and she normally is on 2L/min of at home oxygen due to her COPD. She states that she was watching TV when the pain suddenly started. She denies nausea, vomiting, fever, chills, or LUTS including hamturia or dysuria. Recent admission 7/12-7/18 for pasturella bactermia d/t cat bite, completed 2wk CTX (EOT 7/26, midline removed 7/26). No recent changes in appetite reported. She also denies diarrhea, constipation.     In ED, Slightly hypertensive 172/83, no tachycardia, on 2L NC saturating 98%, afebrile. CBC with slightly worsened anemia (Hb 8.9, baseline seems approx 10), no leukocytosis. CMP with hypokalemia K 3.4, hypercalcemia (corrected 11.5), Alb 2.6, LFT elevation including T. Bili 1.1, AST//54, . Lipase >3000. , troponin normal. CTA (obtained for aortic dissection r/o) showed cholelithiasis Distended BG with pericholecystic fluid and cholelithiasis. No intrahepatic or extrahepatic biliary ductal dilatation. CTA showed cholelithiasis so we obtained a right upper  quadrant ultrasound as well that confirmed the cholelithiasis and could not necessarily rule out cholecystitis. Gave CTX and 1L IVF.     Overview/Hospital Course:  Patient admitted for pancreatitis 2/2 likely blocked duct. CTA Abdomen/pelvis (7/26/24) revealed non-obstruction renal calculi, cholelithiasis, possible early cholecystitis, no signs of choledocholithiasis, and stranding in the pancreas suggestive of pancreatits. MRCP on 7/28/24 showed cholelithiasis and pancreatitis without choledocolithiasis or cholecystitis. General surgery consulted and recommends a HIDA to definitively rule out Cholecystitis. If HIDA is negative, general surgery recommends an ERCP with sphincterotomy instead of a cholecystectomy due to co-morbidities increasing surgical risk. HIDA scan was negative for cholecystitis. Patient underwent an ERCP with sphincterotomy 7/30/24. Pain controlled, PT/OT with recommendations for moderate intensity therapy. SW aware. Pain has greatly improved and there there is no longer reported tenderness. Patient with persistent leukocytosis, given patient AF, physical exam unrevealing for source of infection, likely stress induced but will consider referral to hematology outpatient to make sure no other underlying issues are present. Leukocytes returned to within normal limits on 8//9/24.    Interval History: No events, no changes. Pending placement.    Review of Systems   Constitutional:  Negative for chills, diaphoresis and fever.   Respiratory:  Positive for shortness of breath. Negative for cough.         Patient requires 2L oxygen at home;    Cardiovascular:  Positive for leg swelling. Negative for chest pain and palpitations.        Leg swelling improving   Gastrointestinal:  Negative for abdominal pain, constipation, diarrhea, nausea and vomiting.        Patient reports no abdominal pain and is able to tolerate meals without pain, nausea, or vomiting    Genitourinary:  Negative for difficulty  urinating and dysuria.   Musculoskeletal:  Negative for arthralgias and myalgias.   Skin:  Negative for color change.   Neurological:  Negative for dizziness and headaches.     Objective:     Vital Signs (Most Recent):  Temp: 97.5 °F (36.4 °C) (08/09/24 0747)  Pulse: 80 (08/09/24 0855)  Resp: 18 (08/09/24 0855)  BP: (!) 169/79 (08/09/24 0747)  SpO2: (!) 94 % (08/09/24 0747) Vital Signs (24h Range):  Temp:  [97.4 °F (36.3 °C)-97.6 °F (36.4 °C)] 97.5 °F (36.4 °C)  Pulse:  [64-80] 80  Resp:  [17-18] 18  SpO2:  [92 %-94 %] 94 %  BP: (144-169)/(69-79) 169/79     Weight: 77.1 kg (169 lb 15.6 oz)  Body mass index is 29.18 kg/m².    Intake/Output Summary (Last 24 hours) at 8/9/2024 1053  Last data filed at 8/8/2024 1415  Gross per 24 hour   Intake 350 ml   Output --   Net 350 ml         Physical Exam  Constitutional:       General: She is not in acute distress.     Appearance: Normal appearance. She is not ill-appearing.   HENT:      Head: Normocephalic and atraumatic.   Eyes:      General: No scleral icterus.     Conjunctiva/sclera: Conjunctivae normal.   Cardiovascular:      Rate and Rhythm: Normal rate and regular rhythm.      Pulses: Normal pulses.      Heart sounds: Normal heart sounds.   Pulmonary:      Effort: Pulmonary effort is normal.      Breath sounds: Normal breath sounds.   Abdominal:      General: Abdomen is flat. Bowel sounds are normal. There is no distension.      Palpations: Abdomen is soft.      Tenderness: There is no abdominal tenderness. There is no rebound.      Comments: No tenderness today   Musculoskeletal:         General: Normal range of motion.      Cervical back: Normal range of motion and neck supple.      Right lower leg: Edema present.      Left lower leg: Edema present.      Comments: Edema improving. Around 1+ now.        Skin:     General: Skin is warm and dry.      Coloration: Skin is not jaundiced.   Neurological:      Mental Status: She is alert and oriented to person, place, and  time.   Psychiatric:         Mood and Affect: Mood normal.         Behavior: Behavior normal.             Significant Labs: All pertinent labs within the past 24 hours have been reviewed.    Significant Imaging: I have reviewed all pertinent imaging results/findings within the past 24 hours.    Assessment/Plan:      * Acute pancreatitis  Sudden onset abdominal pain not a/w eating c/f gallstone pancreatitis (given cholelithiasis seen on CTA and US). No fever, chills, or other s/s infection to suggest cholangitis. Does have elevated T bili and AST/ALT. Severe abdominal pain. No nausea or vomiting. No acute alcohol intake. Slight hypercalcemia of unknown etiology, however not profound enough to explain acute pancreatitis. Lipase >3000. CT AP with peripancreatic stranding and edema, greatest about the head and neck, favored to be related to pancreatitis. Location of pain plus CT findings and lab findings do not suggest nephrolithiasis as etiology.     - AES consulted given LFT elevation, appreciate recommendations  - MRCP to better delineate biliary obstruction  - cautious with fluids given CHF (no signs of decompensation, got 1L in ED)  - analgesic therapy  -General surgery recommends HIDA to definitively r/o cholecystitis  -If HIDA negative, General Surgery recommends ERCP w/ sphincterotomy due to co-morbidities increasing surgical risk     -HIDA was negative for cholecystitis;     ERCP with sphincterotomy performed by AES; patient given zosyn after procedure for 3 days    Patient pain has greatly improved; only mild tenderness present; handling meals without issue    Liver enzymes decreasing     Patient [facility]      Leukocytosis  Persistent, physical exam unrevealing, AF  Likely stress induced      COPD (chronic obstructive pulmonary disease)  Patient's COPD is controlled currently.  Patient is currently off COPD Pathway. Continue scheduled inhalers Supplemental oxygen and monitor respiratory status  closely.     No wheezing, no s/s respiratory illness. On home 2L NC.     - No Brezztri on formulary, started on breo    Chronic diastolic heart failure  No signs of decompensation. Stable RLE edema. No orthopnea, no rales.    - Continue home metoprolol        Paroxysmal atrial fibrillation  Patient with Paroxysmal (<7 days) atrial fibrillation which is controlled currently with Beta Blocker. Patient is currently in sinus rhythm.[id number] Score: 3. Anticoagulation not indicated due to possible procedure .    CKD (chronic kidney disease) stage 3, GFR 30-59 ml/min  Creatine stable for now. BMP reviewed- noted Estimated Creatinine Clearance: 50.1 mL/min (based on SCr of 0.9 mg/dL). according to latest data. Based on current GFR, CKD stage is stage 3 - GFR 30-59.  Monitor UOP and serial BMP and adjust therapy as needed. Renally dose meds. Avoid nephrotoxic medications and procedures.    Renal lab values as of (8/5/24) are within normal range    Plan:   - Strict I&Os and daily weights   - Trend Cr  - Strict I&Os  - Avoid nephrotoxic agents (NSAIDs, ACEi/Arbs, aminoglycoside abx, zozyn, amphotericin, tenofovir)  - Renally adjust medications (metformin, gabapentin, cefepime, morphine)    Secondary hyperparathyroidism of renal origin  Causing slight hypercalcemia.     - Check calcitriol; wnl  - CTM CMP      Essential hypertension  Chronic, uncontrolled. Latest blood pressure and vitals reviewed-     Temp:  [97.5 °F (36.4 °C)-98.3 °F (36.8 °C)]   Pulse:  [66-81]   Resp:  [16-18]   BP: ()/(64-82)   SpO2:  [92 %-95 %] .   Home meds for hypertension were reviewed and noted below.   Hypertension Medications               furosemide (LASIX) 40 MG tablet Take 1 tablet (40 mg total) by mouth once daily.    metoprolol succinate (TOPROL-XL) 100 MG 24 hr tablet Take 1 tablet (100 mg total) by mouth once daily.          While in the hospital, will manage blood pressure as follows; Continue home antihypertensive regimen and start  Losartan      VTE Risk Mitigation (From admission, onward)           Ordered     apixaban tablet 5 mg  2 times daily         08/02/24 1910     Reason for No Pharmacological VTE Prophylaxis  Once        Comments: Anticipate possible procerdure   Question:  Reasons:  Answer:  Physician Provided (leave comment)    07/27/24 0430     IP VTE HIGH RISK PATIENT  Once         07/27/24 0430     Place sequential compression device  Until discontinued         07/27/24 0430                    Discharge Planning   NOHEMI: 8/9/2024     Code Status: Full Code   Is the patient medically ready for discharge?:     Reason for patient still in hospital (select all that apply): Pending disposition  Discharge Plan A: Skilled Nursing Facility                  Karey Liang MD  Department of Hospital Medicine   [facility] ([place]-[facility])

## 2024-08-09 NOTE — PROGRESS NOTES
[facility] S[facility] ([address] Medicine  Progress Note    Patient Name: Misa Barajas  MRN: [id number]  Patient Class: IP- Inpatient   Admission Date: 7/26/2024  Length of Stay: 13 days  Attending Physician: Jamey Raya, *  Primary Care Provider: Celia Carlisle MD        Subjective:     Principal Problem:Acute pancreatitis        HPI:  Misa Barajas is a 78F with a PMHx of HTN, HLD, HFpEF, COPD (on 2L NC baseline O2 sat 92%) and pAF who [place] [facility] [place] for acute onset abdominal pain that radiated to her back. States she has RUQ and epigastric pain that started acutely this afternoon. She rates her pain as 10/10 intensity, sharp and non-pleuritic in nature. She reports a past history of nephrolithiasis but states that this pain is different. Pain is not post-prandial in nature and explains that this is differen than her past episodes of nephrolithiasis. SOB started at around thesame time and she normally is on 2L/min of at home oxygen due to her COPD. She states that she was watching TV when the pain suddenly started. She denies nausea, vomiting, fever, chills, or LUTS including hamturia or dysuria. Recent admission 7/12-7/18 for pasturella bactermia d/t cat bite, completed 2wk CTX (EOT 7/26, midline removed 7/26). No recent changes in appetite reported. She also denies diarrhea, constipation.     In ED, Slightly hypertensive 172/83, no tachycardia, on 2L NC saturating 98%, afebrile. CBC with slightly worsened anemia (Hb 8.9, baseline seems approx 10), no leukocytosis. CMP with hypokalemia K 3.4, hypercalcemia (corrected 11.5), Alb 2.6, LFT elevation including T. Bili 1.1, AST//54, . Lipase >3000. , troponin normal. CTA (obtained for aortic dissection r/o) showed cholelithiasis Distended BG with pericholecystic fluid and cholelithiasis. No intrahepatic or extrahepatic biliary ductal dilatation. CTA showed cholelithiasis so we obtained a right upper  quadrant ultrasound as well that confirmed the cholelithiasis and could not necessarily rule out cholecystitis. Gave CTX and 1L IVF.     Overview/Hospital Course:  Patient admitted for pancreatitis 2/2 likely blocked duct. CTA Abdomen/pelvis (7/26/24) revealed non-obstruction renal calculi, cholelithiasis, possible early cholecystitis, no signs of choledocholithiasis, and stranding in the pancreas suggestive of pancreatits. MRCP on 7/28/24 showed cholelithiasis and pancreatitis without choledocolithiasis or cholecystitis. General surgery consulted and recommends a HIDA to definitively rule out Cholecystitis. If HIDA is negative, general surgery recommends an ERCP with sphincterotomy instead of a cholecystectomy due to co-morbidities increasing surgical risk. HIDA scan was negative for cholecystitis. Patient underwent an ERCP with sphincterotomy 7/30/24. Pain controlled, PT/OT with recommendations for moderate intensity therapy. SW aware. Pain has greatly improved and there there is no longer reported tenderness. Patient with persistent leukocytosis, given patient AF, physical exam unrevealing for source of infection, likely stress induced but will consider referral to hematology outpatient to make sure no other underlying issues are present. Leukocytes returned to within normal limits on 8//9/24. Patient developed and is being treated for a yeast infection.     Interval History: Patient has signs concerning for a yeast infection around her groin, buttocks, and belly. Giving Miconazole cream BID. Gave one dose of 150 mg Fluconazole. Awaiting transportation to SNF.     Review of Systems   Constitutional:  Negative for chills, diaphoresis and fever.   Respiratory:  Positive for shortness of breath. Negative for cough.         Patient requires 2L oxygen at home;    Cardiovascular:  Positive for leg swelling. Negative for chest pain and palpitations.        Leg swelling improving   Gastrointestinal:  Negative for  abdominal pain, constipation, diarrhea, nausea and vomiting.        Patient reports no abdominal pain and is able to tolerate meals without pain, nausea, or vomiting    Genitourinary:  Negative for difficulty urinating and dysuria.   Musculoskeletal:  Negative for arthralgias and myalgias.   Skin:  Negative for color change.   Neurological:  Negative for dizziness and headaches.     Objective:     Vital Signs (Most Recent):  Temp: 98.3 °F (36.8 °C) (08/09/24 1651)  Pulse: 67 (08/09/24 1651)  Resp: 17 (08/09/24 1651)  BP: 123/65 (08/09/24 1651)  SpO2: 96 % (08/09/24 1651) Vital Signs (24h Range):  Temp:  [97.4 °F (36.3 °C)-98.3 °F (36.8 °C)] 98.3 °F (36.8 °C)  Pulse:  [64-80] 67  Resp:  [17-18] 17  SpO2:  [92 %-96 %] 96 %  BP: (123-169)/(65-79) 123/65     Weight: 77.1 kg (169 lb 15.6 oz)  Body mass index is 29.18 kg/m².  No intake or output data in the 24 hours ending 08/09/24 1743      Physical Exam  Constitutional:       General: She is not in acute distress.     Appearance: Normal appearance. She is not ill-appearing.   HENT:      Head: Normocephalic and atraumatic.   Eyes:      General: No scleral icterus.     Conjunctiva/sclera: Conjunctivae normal.   Cardiovascular:      Rate and Rhythm: Normal rate and regular rhythm.      Pulses: Normal pulses.      Heart sounds: Normal heart sounds.   Pulmonary:      Effort: Pulmonary effort is normal.      Breath sounds: Normal breath sounds.   Abdominal:      General: Abdomen is flat. Bowel sounds are normal. There is no distension.      Palpations: Abdomen is soft.      Tenderness: There is no abdominal tenderness. There is no rebound.      Comments: No tenderness today   Musculoskeletal:         General: Normal range of motion.      Cervical back: Normal range of motion and neck supple.      Right lower leg: Edema present.      Left lower leg: Edema present.      Comments: Edema improving. Around 1+ now.        Skin:     General: Skin is warm and dry.      Coloration:  Skin is not jaundiced.   Neurological:      Mental Status: She is alert and oriented to person, place, and time.   Psychiatric:         Mood and Affect: Mood normal.         Behavior: Behavior normal.             Significant Labs: All pertinent labs within the past 24 hours have been reviewed.    Significant Imaging: I have reviewed all pertinent imaging results/findings within the past 24 hours.    Assessment/Plan:      Yeast infection  Miconazole cream BID    One dose of Diflucan 150 mg.      Leukocytosis  Persistent, physical exam unrevealing, AF  Likely stress induced      COPD (chronic obstructive pulmonary disease)  Patient's COPD is controlled currently.  Patient is currently off COPD Pathway. Continue scheduled inhalers Supplemental oxygen and monitor respiratory status closely.     No wheezing, no s/s respiratory illness. On home 2L NC.     - No Brezztri on formulary, started on breo    Chronic diastolic heart failure  No signs of decompensation. Stable RLE edema. No orthopnea, no rales.    - Continue home metoprolol        Paroxysmal atrial fibrillation  Patient with Paroxysmal (<7 days) atrial fibrillation which is controlled currently with Beta Blocker. Patient is currently in sinus rhythm.[id number] Score: 3. Anticoagulation not indicated due to possible procedure .    CKD (chronic kidney disease) stage 3, GFR 30-59 ml/min  Creatine stable for now. BMP reviewed- noted Estimated Creatinine Clearance: 50.1 mL/min (based on SCr of 0.9 mg/dL). according to latest data. Based on current GFR, CKD stage is stage 3 - GFR 30-59.  Monitor UOP and serial BMP and adjust therapy as needed. Renally dose meds. Avoid nephrotoxic medications and procedures.    Renal lab values as of (8/5/24) are within normal range    Plan:   - Strict I&Os and daily weights   - Trend Cr  - Strict I&Os  - Avoid nephrotoxic agents (NSAIDs, ACEi/Arbs, aminoglycoside abx, zozyn, amphotericin, tenofovir)  - Renally adjust medications  (metformin, gabapentin, cefepime, morphine)    Secondary hyperparathyroidism of renal origin  Causing slight hypercalcemia.     - Check calcitriol; wnl  - CTM CMP      Essential hypertension  Chronic, uncontrolled. Latest blood pressure and vitals reviewed-     Temp:  [97.5 °F (36.4 °C)-98.3 °F (36.8 °C)]   Pulse:  [66-81]   Resp:  [16-18]   BP: ()/(64-82)   SpO2:  [92 %-95 %] .   Home meds for hypertension were reviewed and noted below.   Hypertension Medications               furosemide (LASIX) 40 MG tablet Take 1 tablet (40 mg total) by mouth once daily.    metoprolol succinate (TOPROL-XL) 100 MG 24 hr tablet Take 1 tablet (100 mg total) by mouth once daily.          While in the hospital, will manage blood pressure as follows; Continue home antihypertensive regimen and start Losartan      VTE Risk Mitigation (From admission, onward)           Ordered     apixaban tablet 5 mg  2 times daily         08/02/24 1910     Reason for No Pharmacological VTE Prophylaxis  Once        Comments: Anticipate possible procerdure   Question:  Reasons:  Answer:  Physician Provided (leave comment)    07/27/24 0430     IP VTE HIGH RISK PATIENT  Once         07/27/24 0430     Place sequential compression device  Until discontinued         07/27/24 0430                    Discharge Planning   NOHEMI: 8/9/2024     Code Status: Full Code   Is the patient medically ready for discharge?:     Reason for patient still in hospital (select all that apply): Pending disposition  Discharge Plan A: Skilled Nursing Facility                  Karey Liang MD  Department of Hospital Medicine   [facility] - Med Surg ([place]-16)

## 2024-08-09 NOTE — DISCHARGE SUMMARY
[facility] - Med S[facility] ([place] [address])  [facility] Medicine  Discharge Summary      Patient Name: Misa Barajas  MRN: [id number]  ARTHUR: [id number]  Patient Class: IP- Inpatient  Admission Date: 7/26/2024  Hospital Length of Stay: 13 days  Discharge Date and Time:  08/09/2024 5:40 PM  Attending Physician: Jamey Raya, *   Discharging Provider: Karey Liang MD  Primary Care Provider: Celia Carlisle MD  Hospital Medicine Team: [facility] HOSP MED 5 Karey Liang MD  Primary Care Team: [facility] 5    HPI:   Misa Barajas is a 78F with a PMHx of HTN, HLD, HFpEF, COPD (on 2L NC baseline O2 sat 92%) and pAF who presented to [facility] 7/26 for acute onset abdominal pain that radiated to her back. States she has RUQ and epigastric pain that started acutely this afternoon. She rates her pain as 10/10 intensity, sharp and non-pleuritic in nature. She reports a past history of nephrolithiasis but states that this pain is different. Pain is not post-prandial in nature and explains that this is differen than her past episodes of nephrolithiasis. SOB started at around thesame time and she normally is on 2L/min of at home oxygen due to her COPD. She states that she was watching TV when the pain suddenly started. She denies nausea, vomiting, fever, chills, or LUTS including hamturia or dysuria. Recent admission 7/12-7/18 for pasturella bactermia d/t cat bite, completed 2wk CTX (EOT 7/26, midline removed 7/26). No recent changes in appetite reported. She also denies diarrhea, constipation.     In ED, Slightly hypertensive 172/83, no tachycardia, on 2L NC saturating 98%, afebrile. CBC with slightly worsened anemia (Hb 8.9, baseline seems approx 10), no leukocytosis. CMP with hypokalemia K 3.4, hypercalcemia (corrected 11.5), Alb 2.6, LFT elevation including T. Bili 1.1, AST//54, . Lipase >3000. , troponin normal. CTA (obtained for aortic dissection r/o) showed cholelithiasis Distended BG with  pericholecystic fluid and cholelithiasis. No intrahepatic or extrahepatic biliary ductal dilatation. CTA showed cholelithiasis so we obtained a right upper quadrant ultrasound as well that confirmed the cholelithiasis and could not necessarily rule out cholecystitis. Gave CTX and 1L IVF.     Procedure(s) (LRB):  ERCP (ENDOSCOPIC RETROGRADE CHOLANGIOPANCREATOGRAPHY) (N/A)      Hospital Course:   Patient admitted for pancreatitis 2/2 likely blocked duct. CTA Abdomen/pelvis (7/26/24) revealed non-obstruction renal calculi, cholelithiasis, possible early cholecystitis, no signs of choledocholithiasis, and stranding in the pancreas suggestive of pancreatits. MRCP on 7/28/24 showed cholelithiasis and pancreatitis without choledocolithiasis or cholecystitis. General surgery consulted and recommends a HIDA to definitively rule out Cholecystitis. If HIDA is negative, general surgery recommends an ERCP with sphincterotomy instead of a cholecystectomy due to co-morbidities increasing surgical risk. HIDA scan was negative for cholecystitis. Patient underwent an ERCP with sphincterotomy 7/30/24. Pain controlled, PT/OT with recommendations for moderate intensity therapy. SW aware. Pain has greatly improved and there there is no longer reported tenderness. Patient with persistent leukocytosis, given patient AF, physical exam unrevealing for source of infection, likely stress induced but will consider referral to hematology outpatient to make sure no other underlying issues are present. Leukocytes returned to within normal limits on 8//9/24. Being treated for yeast infection.      Goals of Care Treatment Preferences:  Code Status: Full Code      SDOH Screening:  The patient was screened for utility difficulties, food insecurity, transport difficulties, housing insecurity, and interpersonal safety and there were no concerns identified this admission.     Consults:   Consults (From admission, onward)          Status Ordering Provider      Inpatient consult to PICC team ([facility])  Once        Provider:  (Not yet assigned)    Completed HALEY DAVIES     Inpatient consult to PICC team ([facility])  Once        Provider:  (Not yet assigned)    Completed HALEY DAVIES     Inpatient consult to General Surgery  Once        Provider:  (Not yet assigned)    Completed JESENIA GAMEZ     Inpatient consult to [facility] ([facility]  Once        Provider:  (Not yet assigned)    Completed ROBERT PEDRAZA  Yeast infection  Miconazole cream BID    One dose of Diflucan 150 mg.        Final Active Diagnoses:    Diagnosis Date Noted POA    Yeast infection [B37.9] 08/09/2024 No    Left arm swelling [M79.89] 08/07/2024 Yes    Leukocytosis [D72.829] 08/06/2024 Yes    COPD (chronic obstructive pulmonary disease) [J44.9] 08/22/2022 Yes    Chronic diastolic heart failure [I50.32] 12/05/2021 Yes    Chronic respiratory failure with hypoxia [J96.11] 06/26/2021 Yes     Chronic    Paroxysmal atrial fibrillation [I48.0] 06/07/2021 Yes    CKD (chronic kidney disease) stage 3, GFR 30-59 ml/min [N18.30] 02/14/2018 Yes     Chronic    Secondary hyperparathyroidism of renal origin [N25.81] 11/27/2015 Yes    Essential hypertension [I10] 05/28/2015 Yes     Chronic      Problems Resolved During this Admission:    Diagnosis Date Noted Date Resolved POA    PRINCIPAL PROBLEM:  Acute pancreatitis [K85.90] 07/27/2024 08/09/2024 Yes       Discharged Condition: stable    Disposition:     Follow Up:   Follow-up Information       Schedule an appointment as soon as possible for a visit  with Celia Carlisle MD.    Specialty: Internal Medicine  Contact information:  [address] [facility]  [place] [address] [phone]                           Patient Instructions:   No discharge procedures on file.    Significant Diagnostic Studies: N/A    Pending Diagnostic Studies:       Procedure Component Value Units Date/Time    Comprehensive Metabolic Panel (CMP) [[id number]]  Collected: 07/29/24 0612    Order Status: Sent Lab Status: No result     Specimen: Blood     Magnesium [[id number]] Collected: 07/29/24 0612    Order Status: Sent Lab Status: No result     Specimen: Blood     Phosphorus [[id number]] Collected: 07/29/24 0612    Order Status: Sent Lab Status: No result     Specimen: Blood            Medications:  Reconciled Home Medications:      Medication List        START taking these medications      losartan 50 MG tablet  Commonly known as: COZAAR  Take 1 tablet (50 mg total) by mouth once daily.            CHANGE how you take these medications      ELIQUIS 5 mg Tab  Generic drug: apixaban  Take 1 tablet (5 mg total) by mouth 2 (two) times daily.  What changed:   medication strength  how much to take  when to take this            CONTINUE taking these medications      acetaminophen 500 MG tablet  Commonly known as: TYLENOL  Take 2 tablets (1,000 mg total) by mouth every 8 (eight) hours as needed for Pain.     albuterol 90 mcg/actuation inhaler  Commonly known as: PROVENTIL HFA  Inhale 2 puffs into the lungs every 6 (six) hours as needed for Wheezing. Rescue     aspirin 81 MG EC tablet  Commonly known as: ECOTRIN  Take 81 mg by mouth once daily.     biotin 1 mg tablet  Take 1,000 mcg by mouth once daily.     BREZTRI AEROSPHERE 160-9-4.8 mcg/actuation Hfaa  Generic drug: budesonide-glycopyr-formoterol  Inhale 2 puffs into the lungs 2 (two) times daily.     busPIRone 5 MG Tab  Commonly known as: BUSPAR  Take 5 mg by mouth daily as needed (anxiety).     fluticasone propionate 50 mcg/actuation nasal spray  Commonly known as: FLONASE  1 spray by Each Nostril route daily as needed for Rhinitis or Allergies.     furosemide 40 MG tablet  Commonly known as: LASIX  Take 1 tablet (40 mg total) by mouth once daily.     inhalation spacing device  Use as directed for inhalation.     metoprolol succinate 100 MG 24 hr tablet  Commonly known as: TOPROL-XL  Take 1 tablet (100 mg total) by mouth  once daily.     vitamin D 1000 units Tab  Commonly known as: VITAMIN D3  Take 2 tablets (2,000 Units total) by mouth once daily.            STOP taking these medications      D5W PgBk 100 mL with cefTRIAXone 2 gram SolR 2 g              Indwelling Lines/Drains at time of discharge:   Lines/Drains/Airways       Drain  Duration             Female External Urinary Catheter w/ Suction 07/27/24 0614 13 days                    Time spent on the discharge of patient: 55 minutes         Karey Liang MD  Department of Hospital Medicine  [facility] Surg ([place]-16)

## 2024-08-09 NOTE — PT/OT/SLP PROGRESS
Occupational Therapy   Co-Treatment  co-treatment performed this date due to need for 2 skilled therapists in order to ensure pt safety, accomodate for pt activity tolerance, and maximize functional potential    Name: Misa Barajas  MRN: [id number]  Admitting Diagnosis:  Acute pancreatitis  10 Days Post-Op    Recommendations:     Discharge Recommendations: Moderate Intensity Therapy  Discharge Equipment Recommendations:  grab bar, walker, rolling  Barriers to discharge:       Assessment:     Misa Barajas is a 78 y.o. female with a medical diagnosis of Acute pancreatitis.  She presents with performance deficits affecting function are weakness, impaired endurance, impaired self care skills, impaired functional mobility, gait instability, impaired balance, decreased lower extremity function, impaired cardiopulmonary response to activity. Pt tolerated tx well, with good effort and motivation throughout. She was able to perform ADLs with Mod A and transfers with Min-Mod A. Pt will continue to benefit from skilled OT services to address impairments listed above to maximize independence with ADLs and functional mobility to ensure safe return to PLOF.     Rehab Prognosis:  Good; patient would benefit from acute skilled OT services to address these deficits and reach maximum level of function.       Plan:     Patient to be seen 4 x/week to address the above listed problems via self-care/home management, therapeutic activities, therapeutic exercises, neuromuscular re-education  Plan of Care Expires: 08/28/24  Plan of Care Reviewed with: patient    Subjective     Chief Complaint: dizziness when standing  Patient/Family Comments/goals: get better  Pain/Comfort:  Pain Rating 1: 0/10  Pain Rating Post-Intervention 1: 0/10    Objective:     Communicated with: RN prior to session.  Patient found HOB elevated with oxygen, telemetry upon OT entry to room.    General Precautions: Standard, fall    Orthopedic  Precautions:N/A  Braces: N/A  Respiratory Status: Nasal cannula, flow 2.5 L/min     Occupational Performance:     Bed Mobility:    Patient completed Scooting/Bridging with stand by assistance and increased time  Patient completed Supine to Sit with minimum assistance     Functional Mobility/Transfers:  Patient completed Sit <> Stand Transfer with minimum and moderate assistance and of 2 persons  with  rolling walker   From EOB- Min x 2   From chair - Mod x 2  Patient completed Bed <> Chair Transfer using Step Transfer technique with CGA and with rolling walker  Functional Mobility:   Pt stood for ~2mins during magnolia care with CGA-Min A, she was able to wipe magnolia area with RUE while in stance with LUE support on RW   Pt ambulated ~8ft with CGA and RW to simulate home distances and maximize functional endurance required for community mobility.  Attempted a second ambulation trial but pt reported dizziness when standing from chair and declined further mobility     Activities of Daily Living:  Bathing: moderate assistance upper body with bath wipes, (A) needed for back and buttocks  Upper Body Dressing: moderate assistance donning back gown  Lower Body Dressing: minimum assistance adjusting socks  Toileting: moderate assistance pt able to wipe anterior magnolia area, (A) needed with perirectal area and with gown mgmt      [facility] 6 Click ADL: 17    Treatment & Education:  Pt educated on   Role of OT and OT POC  Safe transfer techniques and proper body mechanics for fall prevention and improved independence with functional transfers  Importance of OOB activities to increase endurance and tolerance for increased participation in daily ADLs    Utilizing the call bell to request for assistance with all functional mobility to ensure safety during hospital stay.    Pt verbalized understanding and all questions were addressed within the scope of OT.     Patient left up in chair with all lines intact, call button in reach, and RN  notified    GOALS:   Multidisciplinary Problems       Occupational Therapy Goals          Problem: Occupational Therapy    Goal Priority Disciplines Outcome Interventions   Occupational Therapy Goal     OT, PT/OT Progressing    Description: Goals to be met by: 8/28     Patient will increase functional independence with ADLs by performing:    UE Dressing with Stand-by Assistance.  LE Dressing with Stand-by Assistance.  Grooming while standing at sink with Contact Guard Assistance.  Sit>stand with RW from EOB with SBA  Supine to sit with SBA                       Time Tracking:     OT Date of Treatment: 08/09/24  OT Start Time: 1300  OT Stop Time: 1330  OT Total Time (min): 30 min    Billable Minutes:Self Care/Home Management 15  Therapeutic Activity 15    OT/MARYA: OT          8/9/2024

## 2024-08-09 NOTE — PLAN OF CARE
"Discharge Plan A and Plan B have been determined by review of patient's clinical status, future medical and therapeutic needs, and coverage/benefits for post-acute care in coordination with multidisciplinary team members.    08/09/24 0900   Post-Acute Status   Post-Acute Authorization Placement   Post-Acute Placement Status Pending payor review/awaiting authorization (if required)   [facility] MC[facility] ADV -   Discharge Plan   Discharge Plan A Skilled Nursing Facility   Discharge Plan B Home Health;Home with family     MARINE phoned Kat (Cathy Vidal) to f/u on status of SNF auth. Per Kat, auth remains pending. MARINE phoned daughters Sirisha and Esther to review status of SNF placement. MARINE notified daughters of the above. Daughters verbalized understanding.    11:00am  MARINE phoned Kat to request update on status of auth. Per Kat, auth remains pending. MARINE escalated to CM/SW leadership.    12:55pm  MARINE received notification from / leadership that per patient's payor, "There is no pending auth on file for [facility].". MARINE phoned Cathy Vidal to discuss. Kat states that she submitted auth request on 8/7, states that request did not go through to payor's system for unknown reasons. Kat states that she was made aware this am of error and re-submitted auth request today at 11:45am. MARINE notified payor and / leadership that auth request has been re-submitted.    3:40pm  MARINE received notification from payor "This has been approved, [facility] has received the clinicals and an Auth is being created now." MARINE phoned Kat to notifantwan, no answer. MARINE left VM message notifying Kat of auth status. MARINE also phoned daughter Sirisha to provide update on the above. MARINE advised daughter that due to auth approved close to close of SNF business hours, SNF may be unable to move admit forward at this time. MARINE notified daughter and medical team that we will plan for Monday " 8/12 am dc to SNF pending SNF confirmation of auth approval.    SW will continue to follow.                          SULEIMAN Phillips, LMSW  [facility]  Case Management  Ext. [phone]

## 2024-08-09 NOTE — PLAN OF CARE
[facility] - Med Surg ([facility])  Discharge Reassessment    Primary Care Provider: Celia Carlisle MD    Expected Discharge Date: 8/9/2024    Reassessment (most recent)       Discharge Reassessment - 08/09/24 1124          Discharge Reassessment    Assessment Type Discharge Planning Reassessment (P)      Did the patient's condition or plan change since previous assessment? No (P)      Discharge Plan discussed with: Adult children (P)    Daughters Sirisha and Esther    Communicated NOHEMI with patient/caregiver Yes (P)      Discharge Plan A Skilled Nursing Facility (P)      Discharge Plan B Home Health;Home with family (P)      DME Needed Upon Discharge  none (P)      Transition of Care Barriers None (P)      Why the patient remains in the hospital Placement issues;Insurance issues (P)         Post-Acute Status    Post-Acute Authorization Placement (P)      Post-Acute Placement Status Pending payor review/awaiting authorization (if required) (P)      Coverage [employer] MEDICARE ADVANTAGE - [facility]MI[facility] O MCARE ADV - (P)                  Discharge Plan A and Plan B have been determined by review of patient's clinical status, future medical and therapeutic needs, and coverage/benefits for post-acute care in coordination with multidisciplinary team members.                             SULEIMAN Phillips, LMSW  [facility]  Case Management  Ext. [phone]

## 2024-08-10 PROCEDURE — 25000242 PHARM REV CODE 250 ALT 637 W/ HCPCS

## 2024-08-10 PROCEDURE — 25000003 PHARM REV CODE 250

## 2024-08-10 PROCEDURE — 21400001 HC TELEMETRY ROOM

## 2024-08-10 PROCEDURE — 94640 AIRWAY INHALATION TREATMENT: CPT

## 2024-08-10 PROCEDURE — 25000003 PHARM REV CODE 250: Performed by: STUDENT IN AN ORGANIZED HEALTH CARE EDUCATION/TRAINING PROGRAM

## 2024-08-10 RX ADMIN — FUROSEMIDE 40 MG: 20 TABLET ORAL at 10:08

## 2024-08-10 RX ADMIN — MICONAZOLE NITRATE: 20 CREAM TOPICAL at 09:08

## 2024-08-10 RX ADMIN — MICONAZOLE NITRATE: 20 CREAM TOPICAL at 10:08

## 2024-08-10 RX ADMIN — FLUTICASONE FUROATE AND VILANTEROL TRIFENATATE 1 PUFF: 100; 25 POWDER RESPIRATORY (INHALATION) at 10:08

## 2024-08-10 RX ADMIN — LOSARTAN POTASSIUM 50 MG: 50 TABLET, FILM COATED ORAL at 10:08

## 2024-08-10 RX ADMIN — SENNOSIDES 8.6 MG: 8.6 TABLET, FILM COATED ORAL at 10:08

## 2024-08-10 RX ADMIN — APIXABAN 5 MG: 5 TABLET, FILM COATED ORAL at 10:08

## 2024-08-10 RX ADMIN — OXYCODONE HYDROCHLORIDE 5 MG: 5 TABLET ORAL at 05:08

## 2024-08-10 RX ADMIN — METOPROLOL SUCCINATE 100 MG: 50 TABLET, EXTENDED RELEASE ORAL at 10:08

## 2024-08-10 RX ADMIN — APIXABAN 5 MG: 5 TABLET, FILM COATED ORAL at 09:08

## 2024-08-10 RX ADMIN — OXYCODONE HYDROCHLORIDE 5 MG: 5 TABLET ORAL at 10:08

## 2024-08-10 NOTE — PROGRESS NOTES
[facility] Med S[facility] ([address] Medicine  Progress Note    Patient Name: Misa Barajas  MRN: [id number]  Patient Class: IP- Inpatient   Admission Date: 7/26/2024  Length of Stay: 14 days  Attending Physician: Shant Sharma, *  Primary Care Provider: Celia Carlisle MD        Subjective:     Principal Problem:Acute pancreatitis        HPI:  Misa Barajas is a 78F with a PMHx of HTN, HLD, HFpEF, COPD (on 2L NC baseline O2 sat 92%) and pAF who presented to [facility] 7/26 for acute onset abdominal pain that radiated to her back. States she has RUQ and epigastric pain that started acutely this afternoon. She rates her pain as 10/10 intensity, sharp and non-pleuritic in nature. She reports a past history of nephrolithiasis but states that this pain is different. Pain is not post-prandial in nature and explains that this is differen than her past episodes of nephrolithiasis. SOB started at around thesame time and she normally is on 2L/min of at home oxygen due to her COPD. She states that she was watching TV when the pain suddenly started. She denies nausea, vomiting, fever, chills, or LUTS including hamturia or dysuria. Recent admission 7/12-7/18 for pasturella bactermia d/t cat bite, completed 2wk CTX (EOT 7/26, midline removed 7/26). No recent changes in appetite reported. She also denies diarrhea, constipation.     In ED, Slightly hypertensive 172/83, no tachycardia, on 2L NC saturating 98%, afebrile. CBC with slightly worsened anemia (Hb 8.9, baseline seems approx 10), no leukocytosis. CMP with hypokalemia K 3.4, hypercalcemia (corrected 11.5), Alb 2.6, LFT elevation including T. Bili 1.1, AST//54, . Lipase >3000. , troponin normal. CTA (obtained for aortic dissection r/o) showed cholelithiasis Distended BG with pericholecystic fluid and cholelithiasis. No intrahepatic or extrahepatic biliary ductal dilatation. CTA showed cholelithiasis so we obtained a right upper  quadrant ultrasound as well that confirmed the cholelithiasis and could not necessarily rule out cholecystitis. Gave CTX and 1L IVF.     Overview/Hospital Course:  Patient admitted for pancreatitis 2/2 likely blocked duct. CTA Abdomen/pelvis (7/26/24) revealed non-obstruction renal calculi, cholelithiasis, possible early cholecystitis, no signs of choledocholithiasis, and stranding in the pancreas suggestive of pancreatits. MRCP on 7/28/24 showed cholelithiasis and pancreatitis without choledocolithiasis or cholecystitis. General surgery consulted and recommends a HIDA to definitively rule out Cholecystitis. If HIDA is negative, general surgery recommends an ERCP with sphincterotomy instead of a cholecystectomy due to co-morbidities increasing surgical risk. HIDA scan was negative for cholecystitis. Patient underwent an ERCP with sphincterotomy 7/30/24. Pain controlled, PT/OT with recommendations for moderate intensity therapy. SW aware. Pain has greatly improved and there there is no longer reported tenderness. Patient with persistent leukocytosis, given patient AF, physical exam unrevealing for source of infection, likely stress induced but will consider referral to hematology outpatient to make sure no other underlying issues are present. Leukocytes returned to within normal limits on 8//9/24. Patient developed and is being treated for a yeast infection.     Interval History: Patient complains of some groin discomfort and itch from yeast infection. Continuing to treat.     Review of Systems   Constitutional:  Negative for chills, diaphoresis and fever.   Respiratory:  Positive for shortness of breath. Negative for cough.         Patient requires 2L oxygen at home;    Cardiovascular:  Positive for leg swelling. Negative for chest pain and palpitations.        Leg swelling improving   Gastrointestinal:  Negative for abdominal pain, constipation, diarrhea, nausea and vomiting.        Patient reports no abdominal  pain and is able to tolerate meals without pain, nausea, or vomiting    Genitourinary:  Negative for difficulty urinating and dysuria.   Musculoskeletal:  Negative for arthralgias and myalgias.   Skin:  Negative for color change.   Neurological:  Negative for dizziness and headaches.     Objective:     Vital Signs (Most Recent):  Temp: 97.6 °F (36.4 °C) (08/10/24 1118)  Pulse: 75 (08/10/24 1118)  Resp: 18 (08/10/24 1118)  BP: (!) 157/73 (08/10/24 1118)  SpO2: 96 % (08/10/24 1118) Vital Signs (24h Range):  Temp:  [97.5 °F (36.4 °C)-98.3 °F (36.8 °C)] 97.6 °F (36.4 °C)  Pulse:  [18-76] 75  Resp:  [17-18] 18  SpO2:  [93 %-97 %] 96 %  BP: (118-157)/(65-76) 157/73     Weight: 77.1 kg (169 lb 15.6 oz)  Body mass index is 29.18 kg/m².    Intake/Output Summary (Last 24 hours) at 8/10/2024 1454  Last data filed at 8/10/2024 0459  Gross per 24 hour   Intake --   Output 100 ml   Net -100 ml         Physical Exam  Constitutional:       General: She is not in acute distress.     Appearance: Normal appearance. She is not ill-appearing.   HENT:      Head: Normocephalic and atraumatic.   Eyes:      General: No scleral icterus.     Conjunctiva/sclera: Conjunctivae normal.   Cardiovascular:      Rate and Rhythm: Normal rate and regular rhythm.      Pulses: Normal pulses.      Heart sounds: Normal heart sounds.   Pulmonary:      Effort: Pulmonary effort is normal.      Breath sounds: Normal breath sounds.   Abdominal:      General: Abdomen is flat. Bowel sounds are normal. There is no distension.      Palpations: Abdomen is soft.      Tenderness: There is no abdominal tenderness. There is no rebound.      Comments: No tenderness today   Musculoskeletal:         General: Normal range of motion.      Cervical back: Normal range of motion and neck supple.      Right lower leg: Edema present.      Left lower leg: Edema present.      Comments: Edema improving. Around 1+ now.        Skin:     General: Skin is warm and dry.      Coloration:  Skin is not jaundiced.   Neurological:      Mental Status: She is alert and oriented to person, place, and time.   Psychiatric:         Mood and Affect: Mood normal.         Behavior: Behavior normal.             Significant Labs: All pertinent labs within the past 24 hours have been reviewed.    Significant Imaging: I have reviewed all pertinent imaging results/findings within the past 24 hours.    Assessment/Plan:      Yeast infection  Miconazole cream BID    One dose of Diflucan 150 mg.      Leukocytosis  Persistent, physical exam unrevealing, AF  Likely stress induced      COPD (chronic obstructive pulmonary disease)  Patient's COPD is controlled currently.  Patient is currently off COPD Pathway. Continue scheduled inhalers Supplemental oxygen and monitor respiratory status closely.     No wheezing, no s/s respiratory illness. On home 2L NC.     - No Brezztri on formulary, started on breo    Chronic diastolic heart failure  No signs of decompensation. Stable RLE edema. No orthopnea, no rales.    - Continue home metoprolol        Paroxysmal atrial fibrillation  Patient with Paroxysmal (<7 days) atrial fibrillation which is controlled currently with Beta Blocker. Patient is currently in sinus rhythm.[id number] Score: 3. Anticoagulation not indicated due to possible procedure .    CKD (chronic kidney disease) stage 3, GFR 30-59 ml/min  Creatine stable for now. BMP reviewed- noted Estimated Creatinine Clearance: 50.1 mL/min (based on SCr of 0.9 mg/dL). according to latest data. Based on current GFR, CKD stage is stage 3 - GFR 30-59.  Monitor UOP and serial BMP and adjust therapy as needed. Renally dose meds. Avoid nephrotoxic medications and procedures.    Renal lab values as of (8/5/24) are within normal range    Plan:   - Strict I&Os and daily weights   - Trend Cr  - Strict I&Os  - Avoid nephrotoxic agents (NSAIDs, ACEi/Arbs, aminoglycoside abx, zozyn, amphotericin, tenofovir)  - Renally adjust medications  (metformin, gabapentin, cefepime, morphine)    Secondary hyperparathyroidism of renal origin  Causing slight hypercalcemia.     - Check calcitriol; wnl  - CTM CMP      Essential hypertension  Chronic, uncontrolled. Latest blood pressure and vitals reviewed-     Temp:  [97.5 °F (36.4 °C)-98.3 °F (36.8 °C)]   Pulse:  [66-81]   Resp:  [16-18]   BP: ()/(64-82)   SpO2:  [92 %-95 %] .   Home meds for hypertension were reviewed and noted below.   Hypertension Medications               furosemide (LASIX) 40 MG tablet Take 1 tablet (40 mg total) by mouth once daily.    metoprolol succinate (TOPROL-XL) 100 MG 24 hr tablet Take 1 tablet (100 mg total) by mouth once daily.          While in the hospital, will manage blood pressure as follows; Continue home antihypertensive regimen and start Losartan      VTE Risk Mitigation (From admission, onward)           Ordered     apixaban tablet 5 mg  2 times daily         08/02/24 1910     Reason for No Pharmacological VTE Prophylaxis  Once        Comments: Anticipate possible procerdure   Question:  Reasons:  Answer:  Physician Provided (leave comment)    07/27/24 0430     IP VTE HIGH RISK PATIENT  Once         07/27/24 0430     Place sequential compression device  Until discontinued         07/27/24 0430                    Discharge Planning   NOHEMI: 8/12/2024     Code Status: Full Code   Is the patient medically ready for discharge?:     Reason for patient still in hospital (select all that apply): Pending disposition  Discharge Plan A: Skilled Nursing Facility                  Kraey Liang MD  Department of Hospital Medicine   [facility] - Med Surg ([place]-16)

## 2024-08-10 NOTE — SUBJECTIVE & OBJECTIVE
Interval History: Patient complains of some groin discomfort and itch from yeast infection. Continuing to treat.     Review of Systems   Constitutional:  Negative for chills, diaphoresis and fever.   Respiratory:  Positive for shortness of breath. Negative for cough.         Patient requires 2L oxygen at home;    Cardiovascular:  Positive for leg swelling. Negative for chest pain and palpitations.        Leg swelling improving   Gastrointestinal:  Negative for abdominal pain, constipation, diarrhea, nausea and vomiting.        Patient reports no abdominal pain and is able to tolerate meals without pain, nausea, or vomiting    Genitourinary:  Negative for difficulty urinating and dysuria.   Musculoskeletal:  Negative for arthralgias and myalgias.   Skin:  Negative for color change.   Neurological:  Negative for dizziness and headaches.     Objective:     Vital Signs (Most Recent):  Temp: 97.6 °F (36.4 °C) (08/10/24 1118)  Pulse: 75 (08/10/24 1118)  Resp: 18 (08/10/24 1118)  BP: (!) 157/73 (08/10/24 1118)  SpO2: 96 % (08/10/24 1118) Vital Signs (24h Range):  Temp:  [97.5 °F (36.4 °C)-98.3 °F (36.8 °C)] 97.6 °F (36.4 °C)  Pulse:  [18-76] 75  Resp:  [17-18] 18  SpO2:  [93 %-97 %] 96 %  BP: (118-157)/(65-76) 157/73     Weight: 77.1 kg (169 lb 15.6 oz)  Body mass index is 29.18 kg/m².    Intake/Output Summary (Last 24 hours) at 8/10/2024 2935  Last data filed at 8/10/2024 0459  Gross per 24 hour   Intake --   Output 100 ml   Net -100 ml         Physical Exam  Constitutional:       General: She is not in acute distress.     Appearance: Normal appearance. She is not ill-appearing.   HENT:      Head: Normocephalic and atraumatic.   Eyes:      General: No scleral icterus.     Conjunctiva/sclera: Conjunctivae normal.   Cardiovascular:      Rate and Rhythm: Normal rate and regular rhythm.      Pulses: Normal pulses.      Heart sounds: Normal heart sounds.   Pulmonary:      Effort: Pulmonary effort is normal.      Breath sounds:  Normal breath sounds.   Abdominal:      General: Abdomen is flat. Bowel sounds are normal. There is no distension.      Palpations: Abdomen is soft.      Tenderness: There is no abdominal tenderness. There is no rebound.      Comments: No tenderness today   Musculoskeletal:         General: Normal range of motion.      Cervical back: Normal range of motion and neck supple.      Right lower leg: Edema present.      Left lower leg: Edema present.      Comments: Edema improving. Around 1+ now.        Skin:     General: Skin is warm and dry.      Coloration: Skin is not jaundiced.   Neurological:      Mental Status: She is alert and oriented to person, place, and time.   Psychiatric:         Mood and Affect: Mood normal.         Behavior: Behavior normal.             Significant Labs: All pertinent labs within the past 24 hours have been reviewed.    Significant Imaging: I have reviewed all pertinent imaging results/findings within the past 24 hours.

## 2024-08-11 LAB
ALBUMIN SERPL BCP-MCNC: 1.9 G/DL (ref 3.5–5.2)
ALP SERPL-CCNC: 187 U/L (ref 55–135)
ALT SERPL W/O P-5'-P-CCNC: 29 U/L (ref 10–44)
ANION GAP SERPL CALC-SCNC: 9 MMOL/L (ref 8–16)
AST SERPL-CCNC: 40 U/L (ref 10–40)
BILIRUB SERPL-MCNC: 0.8 MG/DL (ref 0.1–1)
BUN SERPL-MCNC: 15 MG/DL (ref 8–23)
CALCIUM SERPL-MCNC: 10.2 MG/DL (ref 8.7–10.5)
CHLORIDE SERPL-SCNC: 106 MMOL/L (ref 95–110)
CO2 SERPL-SCNC: 26 MMOL/L (ref 23–29)
CREAT SERPL-MCNC: 1 MG/DL (ref 0.5–1.4)
EST. GFR  (NO RACE VARIABLE): 57 ML/MIN/1.73 M^2
GLUCOSE SERPL-MCNC: 86 MG/DL (ref 70–110)
MAGNESIUM SERPL-MCNC: 1.8 MG/DL (ref 1.6–2.6)
POTASSIUM SERPL-SCNC: 3.7 MMOL/L (ref 3.5–5.1)
PROT SERPL-MCNC: 5.6 G/DL (ref 6–8.4)
SODIUM SERPL-SCNC: 141 MMOL/L (ref 136–145)

## 2024-08-11 PROCEDURE — 25000003 PHARM REV CODE 250

## 2024-08-11 PROCEDURE — 80053 COMPREHEN METABOLIC PANEL: CPT

## 2024-08-11 PROCEDURE — 25000003 PHARM REV CODE 250: Performed by: STUDENT IN AN ORGANIZED HEALTH CARE EDUCATION/TRAINING PROGRAM

## 2024-08-11 PROCEDURE — 99900035 HC TECH TIME PER 15 MIN (STAT)

## 2024-08-11 PROCEDURE — 97110 THERAPEUTIC EXERCISES: CPT

## 2024-08-11 PROCEDURE — 11000001 HC ACUTE MED/SURG PRIVATE ROOM

## 2024-08-11 PROCEDURE — 94640 AIRWAY INHALATION TREATMENT: CPT

## 2024-08-11 PROCEDURE — 21400001 HC TELEMETRY ROOM

## 2024-08-11 PROCEDURE — 83735 ASSAY OF MAGNESIUM: CPT

## 2024-08-11 PROCEDURE — 27000221 HC OXYGEN, UP TO 24 HOURS

## 2024-08-11 PROCEDURE — 36415 COLL VENOUS BLD VENIPUNCTURE: CPT

## 2024-08-11 PROCEDURE — 94761 N-INVAS EAR/PLS OXIMETRY MLT: CPT

## 2024-08-11 RX ADMIN — METOPROLOL SUCCINATE 100 MG: 50 TABLET, EXTENDED RELEASE ORAL at 02:08

## 2024-08-11 RX ADMIN — APIXABAN 5 MG: 5 TABLET, FILM COATED ORAL at 09:08

## 2024-08-11 RX ADMIN — POLYETHYLENE GLYCOL 3350 17 G: 17 POWDER, FOR SOLUTION ORAL at 09:08

## 2024-08-11 RX ADMIN — SENNOSIDES 8.6 MG: 8.6 TABLET, FILM COATED ORAL at 09:08

## 2024-08-11 RX ADMIN — OXYCODONE HYDROCHLORIDE 5 MG: 5 TABLET ORAL at 01:08

## 2024-08-11 RX ADMIN — APIXABAN 5 MG: 5 TABLET, FILM COATED ORAL at 08:08

## 2024-08-11 RX ADMIN — FLUTICASONE FUROATE AND VILANTEROL TRIFENATATE 1 PUFF: 100; 25 POWDER RESPIRATORY (INHALATION) at 08:08

## 2024-08-11 RX ADMIN — MICONAZOLE NITRATE: 20 CREAM TOPICAL at 08:08

## 2024-08-11 RX ADMIN — MICONAZOLE NITRATE: 20 CREAM TOPICAL at 10:08

## 2024-08-11 NOTE — ASSESSMENT & PLAN NOTE
No signs of decompensation. Stable RLE edema. No orthopnea, no rales.    - Continue home metoprolol, lasix

## 2024-08-11 NOTE — ASSESSMENT & PLAN NOTE
Sudden onset abdominal pain not a/w eating c/f gallstone pancreatitis (given cholelithiasis seen on CTA and US). No fever, chills, or other s/s infection to suggest cholangitis. Does have elevated T bili and AST/ALT. Severe abdominal pain. No nausea or vomiting. No acute alcohol intake. Slight hypercalcemia of unknown etiology, however not profound enough to explain acute pancreatitis. Lipase >3000. CT AP with peripancreatic stranding and edema, greatest about the head and neck, favored to be related to pancreatitis. Location of pain plus CT findings and lab findings do not suggest nephrolithiasis as etiology.     - AES consulted given LFT elevation, appreciate recommendations  - MRCP to better delineate biliary obstruction  - cautious with fluids given CHF (no signs of decompensation, got 1L in ED)  - analgesic therapy  -General surgery recommends HIDA to definitively r/o cholecystitis  -If HIDA negative, General Surgery recommends ERCP w/ sphincterotomy due to co-morbidities increasing surgical risk     -HIDA was negative for cholecystitis;     ERCP with sphincterotomy performed by AES; patient given zosyn after procedure for 3 days    Patient will discharge to [facility]

## 2024-08-11 NOTE — PT/OT/SLP PROGRESS
"Occupational Therapy   Treatment    Name: Misa Barajas  MRN: [id number]  Admitting Diagnosis:  Acute pancreatitis  12 Days Post-Op    Recommendations:     Discharge Recommendations: Moderate Intensity Therapy  Discharge Equipment Recommendations:  grab bar, walker, rolling  Barriers to discharge:  Other (Comment) (increased skilled assist required)    Assessment:     Misa Barajas is a 78 y.o. female with a medical diagnosis of Acute pancreatitis.  She presents with the following performance deficits affecting function: weakness, impaired endurance, impaired self care skills, impaired functional mobility, gait instability, impaired balance, decreased safety awareness, impaired cardiopulmonary response to activity, edema. Pt agreeable to session. Pt demonstrating increased activity tolerance as compared to previous sessions as indicated by less assistance required for bed mobility. Pt would continue to benefit from skilled OT services in the acute care setting to improve functional strength and endurance needed for functional transfers and mobility, promote increased participation in ADL routines, and to facilitate a return to PLOF and least restrictive home environment.     Rehab Prognosis:  Good; patient would benefit from acute skilled OT services to address these deficits and reach maximum level of function.       Plan:     Patient to be seen 4 x/week to address the above listed problems via self-care/home management, therapeutic activities, therapeutic exercises, neuromuscular re-education  Plan of Care Expires: 08/28/24  Plan of Care Reviewed with: patient    Subjective     Chief Complaint: none reported  Patient/Family Comments/goals: "I've been up in the chair since the morning, and I just got back into bed."  Pain/Comfort:  Pain Rating 1: 0/10    Objective:     Communicated with: Nursing prior to session.  Patient found supine with oxygen, telemetry, peripheral IV, PureWick upon OT entry to " room.    General Precautions: Standard, fall    Orthopedic Precautions:N/A  Braces: N/A  Respiratory Status: Nasal cannula, flow 2 L/min     Occupational Performance:     Bed Mobility:    Patient completed Scooting/Bridging with stand by assistance  Patient completed Supine to Sit with stand by assistance  Patient completed Sit to Supine with stand by assistance     Functional Mobility/Transfers:  Functional Mobility: Pt able to tolerate sitting unsupported EOB for ~15 minutes to complete therapeutic exercises with SBA provided for safety. No LOB or SOB noted.     Activities of Daily Living:  Toileting: dependence Pure Wick      [facility] 6 Click ADL: 17    Treatment & Education:  Pt participation in resistive therapeutic exercises with red theraband to promote increased functional BUE strength needed for functional transfers and desired occupations. Pt completed 1 set of x10 repetitions of BUE shoulder flexion, shoulder abduction, horizontal abductions, external rotations, and bicep curls within a pain-free range, adjusted tension of theraband for each exercise to find just-right challenge. Pt requiring min verbal and tactile cueing to isolate desired muscle groups and promote correct form throughout full arc of motion. Instructed pt to perform TE 3x/day to maintain and increase functional strength while she is in the acute care setting - pt verbalized understanding.     Patient left HOB elevated with all lines intact and call button in reach    GOALS:   Multidisciplinary Problems       Occupational Therapy Goals          Problem: Occupational Therapy    Goal Priority Disciplines Outcome Interventions   Occupational Therapy Goal     OT, PT/OT Progressing    Description: Goals to be met by: 8/28     Patient will increase functional independence with ADLs by performing:    UE Dressing with Stand-by Assistance.  LE Dressing with Stand-by Assistance.  Grooming while standing at sink with Contact Guard  Assistance.  Sit>stand with RW from EOB with SBA  Supine to sit with SBA                       Time Tracking:     OT Date of Treatment: 08/11/24  OT Start Time: 1141  OT Stop Time: 1158  OT Total Time (min): 17 min    Billable Minutes:Therapeutic Exercise 17 minutes    OT/MARYA: OT          8/11/2024

## 2024-08-11 NOTE — SUBJECTIVE & OBJECTIVE
Interval History: Patient complains of some groin discomfort and itch from yeast infection. Continuing to treat.     Review of Systems   Constitutional:  Negative for chills, diaphoresis and fever.   Respiratory:  Negative for cough and shortness of breath.         Patient requires 2L oxygen at home;    Cardiovascular:  Negative for chest pain, palpitations and leg swelling.   Gastrointestinal:  Negative for abdominal pain, constipation, diarrhea, nausea and vomiting.        Patient reports no abdominal pain and is able to tolerate meals without pain, nausea, or vomiting    Genitourinary:  Negative for difficulty urinating and dysuria.   Musculoskeletal:  Negative for arthralgias and myalgias.   Skin:  Negative for color change.   Neurological:  Negative for dizziness and headaches.     Objective:     Vital Signs (Most Recent):  Temp: 97.6 °F (36.4 °C) (08/11/24 0946)  Pulse: (!) 57 (08/11/24 0946)  Resp: 18 (08/11/24 0946)  BP: (!) 93/50 (08/11/24 0946)  SpO2: 95 % (08/11/24 0946) Vital Signs (24h Range):  Temp:  [97.1 °F (36.2 °C)-97.8 °F (36.6 °C)] 97.6 °F (36.4 °C)  Pulse:  [57-70] 57  Resp:  [16-18] 18  SpO2:  [95 %-98 %] 95 %  BP: ()/(50-61) 93/50     Weight: 77.1 kg (169 lb 15.6 oz)  Body mass index is 29.18 kg/m².    Intake/Output Summary (Last 24 hours) at 8/11/2024 1236  Last data filed at 8/11/2024 0436  Gross per 24 hour   Intake --   Output 1150 ml   Net -1150 ml         Physical Exam  Constitutional:       General: She is not in acute distress.     Appearance: Normal appearance. She is not ill-appearing.   HENT:      Head: Normocephalic and atraumatic.   Eyes:      General: No scleral icterus.     Conjunctiva/sclera: Conjunctivae normal.   Cardiovascular:      Rate and Rhythm: Normal rate and regular rhythm.      Pulses: Normal pulses.      Heart sounds: Normal heart sounds.   Pulmonary:      Effort: Pulmonary effort is normal.      Breath sounds: Normal breath sounds.   Abdominal:      General:  Abdomen is flat. Bowel sounds are normal. There is no distension.      Palpations: Abdomen is soft.      Tenderness: There is no abdominal tenderness. There is no rebound.      Comments: No tenderness today   Musculoskeletal:         General: Normal range of motion.      Cervical back: Normal range of motion and neck supple.      Right lower leg: Edema present.      Left lower leg: Edema present.      Comments: Edema improving. Around 1+ now.        Skin:     General: Skin is warm and dry.      Coloration: Skin is not jaundiced.   Neurological:      Mental Status: She is alert and oriented to person, place, and time.   Psychiatric:         Mood and Affect: Mood normal.         Behavior: Behavior normal.             Significant Labs: All pertinent labs within the past 24 hours have been reviewed.    Significant Imaging: I have reviewed all pertinent imaging results/findings within the past 24 hours.

## 2024-08-11 NOTE — PROGRESS NOTES
[facility] ([address] Medicine  Progress Note    Patient Name: Misa Barajas  MRN: [id number]  Patient Class: IP- Inpatient   Admission Date: 7/26/2024  Length of Stay: 15 days  Attending Physician: Shant Sharma, *  Primary Care Provider: Celia Carlisle MD        Subjective:     Principal Problem:Acute pancreatitis        HPI:  Misa Barajas is a 78F with a PMHx of HTN, HLD, HFpEF, COPD (on 2L NC baseline O2 sat 92%) and pAF who presented to [facility] 7/26 for acute onset abdominal pain that radiated to her back. States she has RUQ and epigastric pain that started acutely this afternoon. She rates her pain as 10/10 intensity, sharp and non-pleuritic in nature. She reports a past history of nephrolithiasis but states that this pain is different. Pain is not post-prandial in nature and explains that this is differen than her past episodes of nephrolithiasis. SOB started at around thesame time and she normally is on 2L/min of at home oxygen due to her COPD. She states that she was watching TV when the pain suddenly started. She denies nausea, vomiting, fever, chills, or LUTS including hamturia or dysuria. Recent admission 7/12-7/18 for pasturella bactermia d/t cat bite, completed 2wk CTX (EOT 7/26, midline removed 7/26). No recent changes in appetite reported. She also denies diarrhea, constipation.     In ED, Slightly hypertensive 172/83, no tachycardia, on 2L NC saturating 98%, afebrile. CBC with slightly worsened anemia (Hb 8.9, baseline seems approx 10), no leukocytosis. CMP with hypokalemia K 3.4, hypercalcemia (corrected 11.5), Alb 2.6, LFT elevation including T. Bili 1.1, AST//54, . Lipase >3000. , troponin normal. CTA (obtained for aortic dissection r/o) showed cholelithiasis Distended BG with pericholecystic fluid and cholelithiasis. No intrahepatic or extrahepatic biliary ductal dilatation. CTA showed cholelithiasis so we obtained a right upper  quadrant ultrasound as well that confirmed the cholelithiasis and could not necessarily rule out cholecystitis. Gave CTX and 1L IVF.     Overview/Hospital Course:  Patient admitted for pancreatitis 2/2 likely blocked duct. CTA Abdomen/pelvis (7/26/24) revealed non-obstruction renal calculi, cholelithiasis, possible early cholecystitis, no signs of choledocholithiasis, and stranding in the pancreas suggestive of pancreatits. MRCP on 7/28/24 showed cholelithiasis and pancreatitis without choledocolithiasis or cholecystitis. General surgery consulted and recommended HIDA to definitively rule out Cholecystitis. If HIDA is negative, general surgery recommends an ERCP with sphincterotomy instead of a cholecystectomy due to co-morbidities increasing surgical risk. HIDA scan was negative for cholecystitis. Patient underwent an ERCP with sphincterotomy 7/30/24. Pain controlled, PT/OT with recommendations for moderate intensity therapy. SW aware. Pain has greatly improved and there there is no longer reported tenderness. Patient with persistent leukocytosis, given patient AF, physical exam unrevealing for source of infection, likely stress induced but will consider referral to hematology outpatient to make sure no other underlying issues are present. Leukocytes returned to within normal limits on 8//9/24. Patient developed and is being treated for a yeast infection.     Interval History: Patient complains of some groin discomfort and itch from yeast infection. Continuing to treat.     Review of Systems   Constitutional:  Negative for chills, diaphoresis and fever.   Respiratory:  Negative for cough and shortness of breath.         Patient requires 2L oxygen at home;    Cardiovascular:  Negative for chest pain, palpitations and leg swelling.   Gastrointestinal:  Negative for abdominal pain, constipation, diarrhea, nausea and vomiting.        Patient reports no abdominal pain and is able to tolerate meals without pain, nausea,  or vomiting    Genitourinary:  Negative for difficulty urinating and dysuria.   Musculoskeletal:  Negative for arthralgias and myalgias.   Skin:  Negative for color change.   Neurological:  Negative for dizziness and headaches.     Objective:     Vital Signs (Most Recent):  Temp: 97.6 °F (36.4 °C) (08/11/24 0946)  Pulse: (!) 57 (08/11/24 0946)  Resp: 18 (08/11/24 0946)  BP: (!) 93/50 (08/11/24 0946)  SpO2: 95 % (08/11/24 0946) Vital Signs (24h Range):  Temp:  [97.1 °F (36.2 °C)-97.8 °F (36.6 °C)] 97.6 °F (36.4 °C)  Pulse:  [57-70] 57  Resp:  [16-18] 18  SpO2:  [95 %-98 %] 95 %  BP: ()/(50-61) 93/50     Weight: 77.1 kg (169 lb 15.6 oz)  Body mass index is 29.18 kg/m².    Intake/Output Summary (Last 24 hours) at 8/11/2024 1236  Last data filed at 8/11/2024 0436  Gross per 24 hour   Intake --   Output 1150 ml   Net -1150 ml         Physical Exam  Constitutional:       General: She is not in acute distress.     Appearance: Normal appearance. She is not ill-appearing.   HENT:      Head: Normocephalic and atraumatic.   Eyes:      General: No scleral icterus.     Conjunctiva/sclera: Conjunctivae normal.   Cardiovascular:      Rate and Rhythm: Normal rate and regular rhythm.      Pulses: Normal pulses.      Heart sounds: Normal heart sounds.   Pulmonary:      Effort: Pulmonary effort is normal.      Breath sounds: Normal breath sounds.   Abdominal:      General: Abdomen is flat. Bowel sounds are normal. There is no distension.      Palpations: Abdomen is soft.      Tenderness: There is no abdominal tenderness. There is no rebound.      Comments: No tenderness today   Musculoskeletal:         General: Normal range of motion.      Cervical back: Normal range of motion and neck supple.      Right lower leg: Edema present.      Left lower leg: Edema present.      Comments: Edema improving. Around 1+ now.        Skin:     General: Skin is warm and dry.      Coloration: Skin is not jaundiced.   Neurological:      Mental  Status: She is alert and oriented to person, place, and time.   Psychiatric:         Mood and Affect: Mood normal.         Behavior: Behavior normal.             Significant Labs: All pertinent labs within the past 24 hours have been reviewed.    Significant Imaging: I have reviewed all pertinent imaging results/findings within the past 24 hours.    Assessment/Plan:      * Acute pancreatitis  Sudden onset abdominal pain not a/w eating c/f gallstone pancreatitis (given cholelithiasis seen on CTA and US). No fever, chills, or other s/s infection to suggest cholangitis. Does have elevated T bili and AST/ALT. Severe abdominal pain. No nausea or vomiting. No acute alcohol intake. Slight hypercalcemia of unknown etiology, however not profound enough to explain acute pancreatitis. Lipase >3000. CT AP with peripancreatic stranding and edema, greatest about the head and neck, favored to be related to pancreatitis. Location of pain plus CT findings and lab findings do not suggest nephrolithiasis as etiology.     - AES consulted given LFT elevation, appreciate recommendations  - MRCP to better delineate biliary obstruction  - cautious with fluids given CHF (no signs of decompensation, got 1L in ED)  - analgesic therapy  -General surgery recommends HIDA to definitively r/o cholecystitis  -If HIDA negative, General Surgery recommends ERCP w/ sphincterotomy due to co-morbidities increasing surgical risk     -HIDA was negative for cholecystitis;     ERCP with sphincterotomy performed by AES; patient given zosyn after procedure for 3 days    Patient will discharge to SNF    Yeast infection  Miconazole cream BID    One dose of Diflucan 150 mg.      Leukocytosis  Persistent, physical exam unrevealing, AF  Likely stress induced      COPD (chronic obstructive pulmonary disease)  Patient's COPD is controlled currently.  Patient is currently off COPD Pathway. Continue scheduled inhalers Supplemental oxygen and monitor respiratory status  closely.     No wheezing, no s/s respiratory illness. On home 2L NC.     - No Brezztri on formulary, started on breo    Chronic diastolic heart failure  No signs of decompensation. Stable RLE edema. No orthopnea, no rales.    - Continue home metoprolol, lasix    Paroxysmal atrial fibrillation  Patient with Paroxysmal (<7 days) atrial fibrillation which is controlled currently with Beta Blocker. Patient is currently in sinus rhythm.[id number] Score: 3. Anticoagulation not indicated due to possible procedure .    CKD (chronic kidney disease) stage 3, GFR 30-59 ml/min  Creatine stable for now. BMP reviewed- noted Estimated Creatinine Clearance: 45.1 mL/min (based on SCr of 1 mg/dL). according to latest data. Based on current GFR, CKD stage is stage 3 - GFR 30-59.  Monitor UOP and serial BMP and adjust therapy as needed. Renally dose meds. Avoid nephrotoxic medications and procedures.    Renal lab values as of (8/5/24) are within normal range    Plan:   - Strict I&Os and daily weights   - Trend Cr  - Strict I&Os  - Avoid nephrotoxic agents (NSAIDs, ACEi/Arbs, aminoglycoside abx, zozyn, amphotericin, tenofovir)  - Renally adjust medications (metformin, gabapentin, cefepime, morphine)    Secondary hyperparathyroidism of renal origin  Noted. Ca WNL    Essential hypertension  Chronic, uncontrolled. Latest blood pressure and vitals reviewed-     Temp:  [97.5 °F (36.4 °C)-98.3 °F (36.8 °C)]   Pulse:  [66-81]   Resp:  [16-18]   BP: ()/(64-82)   SpO2:  [92 %-95 %] .   Home meds for hypertension were reviewed and noted below.   Hypertension Medications               furosemide (LASIX) 40 MG tablet Take 1 tablet (40 mg total) by mouth once daily.    metoprolol succinate (TOPROL-XL) 100 MG 24 hr tablet Take 1 tablet (100 mg total) by mouth once daily.          While in the hospital, will manage blood pressure as follows; Continue home antihypertensive regimen and start Losartan      VTE Risk Mitigation (From admission,  onward)           Ordered     apixaban tablet 5 mg  2 times daily         08/02/24 1910     Reason for No Pharmacological VTE Prophylaxis  Once        Comments: Anticipate possible procerdure   Question:  Reasons:  Answer:  Physician Provided (leave comment)    07/27/24 0430     IP VTE HIGH RISK PATIENT  Once         07/27/24 0430     Place sequential compression device  Until discontinued         07/27/24 0430                    Discharge Planning   NOHEMI: 8/12/2024     Code Status: Full Code   Is the patient medically ready for discharge?:     Reason for patient still in hospital (select all that apply): Pending disposition  Discharge Plan A: Skilled Nursing Facility                  Michael Romero MD  Department of Hospital Medicine   [facility] S[facility] ([place])

## 2024-08-11 NOTE — ASSESSMENT & PLAN NOTE
Creatine stable for now. BMP reviewed- noted Estimated Creatinine Clearance: 45.1 mL/min (based on SCr of 1 mg/dL). according to latest data. Based on current GFR, CKD stage is stage 3 - GFR 30-59.  Monitor UOP and serial BMP and adjust therapy as needed. Renally dose meds. Avoid nephrotoxic medications and procedures.    Renal lab values as of (8/5/24) are within normal range    Plan:   - Strict I&Os and daily weights   - Trend Cr  - Strict I&Os  - Avoid nephrotoxic agents (NSAIDs, ACEi/Arbs, aminoglycoside abx, zozyn, amphotericin, tenofovir)  - Renally adjust medications (metformin, gabapentin, cefepime, morphine)

## 2024-08-12 VITALS
TEMPERATURE: 97 F | HEIGHT: 64 IN | DIASTOLIC BLOOD PRESSURE: 73 MMHG | WEIGHT: 170 LBS | OXYGEN SATURATION: 96 % | RESPIRATION RATE: 19 BRPM | HEART RATE: 68 BPM | SYSTOLIC BLOOD PRESSURE: 147 MMHG | BODY MASS INDEX: 29.02 KG/M2

## 2024-08-12 PROCEDURE — 94761 N-INVAS EAR/PLS OXIMETRY MLT: CPT

## 2024-08-12 PROCEDURE — 25000003 PHARM REV CODE 250

## 2024-08-12 PROCEDURE — 99900035 HC TECH TIME PER 15 MIN (STAT)

## 2024-08-12 PROCEDURE — 97535 SELF CARE MNGMENT TRAINING: CPT

## 2024-08-12 PROCEDURE — 25000003 PHARM REV CODE 250: Performed by: STUDENT IN AN ORGANIZED HEALTH CARE EDUCATION/TRAINING PROGRAM

## 2024-08-12 PROCEDURE — 27000221 HC OXYGEN, UP TO 24 HOURS

## 2024-08-12 PROCEDURE — 97110 THERAPEUTIC EXERCISES: CPT

## 2024-08-12 PROCEDURE — 94640 AIRWAY INHALATION TREATMENT: CPT

## 2024-08-12 RX ADMIN — METOPROLOL SUCCINATE 100 MG: 50 TABLET, EXTENDED RELEASE ORAL at 10:08

## 2024-08-12 RX ADMIN — APIXABAN 5 MG: 5 TABLET, FILM COATED ORAL at 08:08

## 2024-08-12 RX ADMIN — FLUTICASONE FUROATE AND VILANTEROL TRIFENATATE 1 PUFF: 100; 25 POWDER RESPIRATORY (INHALATION) at 07:08

## 2024-08-12 RX ADMIN — LOSARTAN POTASSIUM 50 MG: 50 TABLET, FILM COATED ORAL at 10:08

## 2024-08-12 RX ADMIN — SENNOSIDES 8.6 MG: 8.6 TABLET, FILM COATED ORAL at 08:08

## 2024-08-12 RX ADMIN — FUROSEMIDE 40 MG: 20 TABLET ORAL at 10:08

## 2024-08-12 RX ADMIN — MICONAZOLE NITRATE: 20 CREAM TOPICAL at 10:08

## 2024-08-12 NOTE — PLAN OF CARE
NURSING HOME ORDERS    08/12/2024  [facility]  [address] [address]  Dept: [phone]  Loc: [phone]     Admit to Nursing Home:      Diagnoses:  Active Hospital Problems    Diagnosis  POA    *Acute pancreatitis [K85.90]  Yes    Yeast infection [B37.9]  No    Left arm swelling [M79.89]  Yes    Leukocytosis [D72.829]  Yes    COPD (chronic obstructive pulmonary disease) [J44.9]  Yes     Continue current medication. F/u with pulmonology      Chronic diastolic heart failure [I50.32]  Yes    Chronic respiratory failure with hypoxia [J96.11]  Yes     Chronic    Paroxysmal atrial fibrillation [I48.0]  Yes    CKD (chronic kidney disease) stage 3, GFR 30-59 ml/min [N18.30]  Yes     Chronic    Secondary hyperparathyroidism of renal origin [N25.81]  Yes    Essential hypertension [I10]  Yes     Chronic      Resolved Hospital Problems   No resolved problems to display.       Patient is homebound due to:  Acute pancreatitis    Allergies:  Review of patient's allergies indicates:   Allergen Reactions    Adhesive tape-silicones     Adhesive Rash     Pt states she removed a LATEX bandaid and the skin beneath was swollen and red. No other latex causes a reaction.       Vitals:  Every shift    Diet: cardiac diet    Activities:   Activity as tolerated    Goals of Care Treatment Preferences:  Code Status: Full Code      Labs:  None    Nursing Precautions:  Fall    Consults:   PT to evaluate and treat- 5 times a week and OT to evaluate and treat- 5 times a week     Miscellaneous Care: Routine Skin for Bedridden Patients:  Apply moisture barrier cream to all                   Diabetes Care:  Not diabetic     Medications: Discontinue all previous medication orders, if any. See new list below.     Medication List        START taking these medications      losartan 50 MG tablet  Commonly known as: COZAAR  Take 1 tablet (50 mg total) by mouth once daily.             CHANGE how you take these medications      ELIQUIS 5 mg Tab  Generic drug: apixaban  Take 1 tablet (5 mg total) by mouth 2 (two) times daily.  What changed:   medication strength  how much to take  when to take this            CONTINUE taking these medications      acetaminophen 500 MG tablet  Commonly known as: TYLENOL  Take 2 tablets (1,000 mg total) by mouth every 8 (eight) hours as needed for Pain.     albuterol 90 mcg/actuation inhaler  Commonly known as: PROVENTIL HFA  Inhale 2 puffs into the lungs every 6 (six) hours as needed for Wheezing. Rescue     aspirin 81 MG EC tablet  Commonly known as: ECOTRIN  Take 81 mg by mouth once daily.     biotin 1 mg tablet  Take 1,000 mcg by mouth once daily.     BREZTRI AEROSPHERE 160-9-4.8 mcg/actuation Hfaa  Generic drug: budesonide-glycopyr-formoterol  Inhale 2 puffs into the lungs 2 (two) times daily.     busPIRone 5 MG Tab  Commonly known as: BUSPAR  Take 5 mg by mouth daily as needed (anxiety).     fluticasone propionate 50 mcg/actuation nasal spray  Commonly known as: FLONASE  1 spray by Each Nostril route daily as needed for Rhinitis or Allergies.     furosemide 40 MG tablet  Commonly known as: LASIX  Take 1 tablet (40 mg total) by mouth once daily.     inhalation spacing device  Use as directed for inhalation.     metoprolol succinate 100 MG 24 hr tablet  Commonly known as: TOPROL-XL  Take 1 tablet (100 mg total) by mouth once daily.     vitamin D 1000 units Tab  Commonly known as: VITAMIN D3  Take 2 tablets (2,000 Units total) by mouth once daily.            STOP taking these medications      D5W PgBk 100 mL with cefTRIAXone 2 gram SolR 2 g                Immunizations Administered as of 8/12/2024       Name Date Dose VIS Date Route Exp Date    COVID-19, MRNA, LN-S, PF (Pfizer) (Purple Cap) 12/10/2021 0.3 mL 5/10/2021 Intramuscular --    Site: Right arm     : [employer]     Lot: [id number]     Comment: Adminis     COVID-19, MRNA, LN-S, PF (Pfizer)  (Purple Cap) 2/4/2021  3:16 PM 0.3 mL 12/12/2020 Intramuscular 5/31/2021    Site: Right deltoid     Given By: Ana Woods, RN     : [employer]     Lot: [id number]     COVID-19, MRNA, LN-S, PF (Pfizer) (Purple Cap) 1/13/2021  1:14 PM 0.3 mL 12/12/2020 Intramuscular 1/13/2021    Site: Right deltoid     Given By: Misa Ritchie LPN     : [employer]     Lot: [id number]                 Some patients may experience side effects after vaccination.  These may include fever, headache, muscle or joint aches.  Most symptoms resolve with 24-48 hours and do not require urgent medical evaluation unless they persist for more than 72 hours or symptoms are concerning for an unrelated medical condition.          _________________________________  Freddy Pinedo MD  08/12/2024

## 2024-08-12 NOTE — PROGRESS NOTES
[facility] - Med Surg ([address] Medicine  Progress Note    Patient Name: Misa Barajas  MRN: [id number]  Patient Class: IP- Inpatient   Admission Date: 7/26/2024  Length of Stay: 16 days  Attending Physician: Jose Brizuela MD  Primary Care Provider: Celia Cralisle MD        Subjective:     Principal Problem:Acute pancreatitis        HPI:  Misa Barajas is a 78F with a PMHx of HTN, HLD, HFpEF, COPD (on 2L NC baseline O2 sat 92%) and pAF who presented to [facility] 7/26 for acute onset abdominal pain that radiated to her back. States she has RUQ and epigastric pain that started acutely this afternoon. She rates her pain as 10/10 intensity, sharp and non-pleuritic in nature. She reports a past history of nephrolithiasis but states that this pain is different. Pain is not post-prandial in nature and explains that this is differen than her past episodes of nephrolithiasis. SOB started at around thesame time and she normally is on 2L/min of at home oxygen due to her COPD. She states that she was watching TV when the pain suddenly started. She denies nausea, vomiting, fever, chills, or LUTS including hamturia or dysuria. Recent admission 7/12-7/18 for pasturella bactermia d/t cat bite, completed 2wk CTX (EOT 7/26, midline removed 7/26). No recent changes in appetite reported. She also denies diarrhea, constipation.     In ED, Slightly hypertensive 172/83, no tachycardia, on 2L NC saturating 98%, afebrile. CBC with slightly worsened anemia (Hb 8.9, baseline seems approx 10), no leukocytosis. CMP with hypokalemia K 3.4, hypercalcemia (corrected 11.5), Alb 2.6, LFT elevation including T. Bili 1.1, AST//54, . Lipase >3000. , troponin normal. CTA (obtained for aortic dissection r/o) showed cholelithiasis Distended BG with pericholecystic fluid and cholelithiasis. No intrahepatic or extrahepatic biliary ductal dilatation. CTA showed cholelithiasis so we obtained a right upper quadrant  ultrasound as well that confirmed the cholelithiasis and could not necessarily rule out cholecystitis. Gave CTX and 1L IVF.     Overview/Hospital Course:  Patient admitted for pancreatitis 2/2 likely blocked duct. CTA Abdomen/pelvis (7/26/24) revealed non-obstruction renal calculi, cholelithiasis, possible early cholecystitis, no signs of choledocholithiasis, and stranding in the pancreas suggestive of pancreatits. MRCP on 7/28/24 showed cholelithiasis and pancreatitis without choledocolithiasis or cholecystitis. General surgery consulted and recommended HIDA to definitively rule out Cholecystitis. If HIDA is negative, general surgery recommends an ERCP with sphincterotomy instead of a cholecystectomy due to co-morbidities increasing surgical risk. HIDA scan was negative for cholecystitis. Patient underwent an ERCP with sphincterotomy 7/30/24. Pain controlled, PT/OT with recommendations for moderate intensity therapy. SW aware. Pain has greatly improved and there there is no longer reported tenderness. Patient with persistent leukocytosis, given patient AF, physical exam unrevealing for source of infection, likely stress induced but will consider referral to hematology outpatient to make sure no other underlying issues are present. Leukocytes returned to within normal limits on 8//9/24. Patient developed and is being treated for a yeast infection.     No new subjective & objective note has been filed under this hospital service since the last note was generated.      Assessment/Plan:      * Acute pancreatitis  Sudden onset abdominal pain not a/w eating c/f gallstone pancreatitis (given cholelithiasis seen on CTA and US). No fever, chills, or other s/s infection to suggest cholangitis. Does have elevated T bili and AST/ALT. Severe abdominal pain. No nausea or vomiting. No acute alcohol intake. Slight hypercalcemia of unknown etiology, however not profound enough to explain acute pancreatitis. Lipase >3000. CT AP with  peripancreatic stranding and edema, greatest about the head and neck, favored to be related to pancreatitis. Location of pain plus CT findings and lab findings do not suggest nephrolithiasis as etiology.     - AES consulted given LFT elevation, appreciate recommendations  - MRCP to better delineate biliary obstruction  - cautious with fluids given CHF (no signs of decompensation, got 1L in ED)  - analgesic therapy  -General surgery recommends HIDA to definitively r/o cholecystitis  -If HIDA negative, General Surgery recommends ERCP w/ sphincterotomy due to co-morbidities increasing surgical risk     -HIDA was negative for cholecystitis;     ERCP with sphincterotomy performed by AES; patient given zosyn after procedure for 3 days    Patient will discharge to SNF    Yeast infection  Miconazole cream BID    One dose of Diflucan 150 mg.      Leukocytosis  Persistent, physical exam unrevealing, AF  Likely stress induced      COPD (chronic obstructive pulmonary disease)  Patient's COPD is controlled currently.  Patient is currently off COPD Pathway. Continue scheduled inhalers Supplemental oxygen and monitor respiratory status closely.     No wheezing, no s/s respiratory illness. On home 2L NC.     - No Brezztri on formulary, started on breo    Chronic diastolic heart failure  No signs of decompensation. Stable RLE edema. No orthopnea, no rales.    - Continue home metoprolol, lasix    Paroxysmal atrial fibrillation  Patient with Paroxysmal (<7 days) atrial fibrillation which is controlled currently with Beta Blocker. Patient is currently in sinus rhythm.[id number] Score: 3. Anticoagulation not indicated due to possible procedure .    CKD (chronic kidney disease) stage 3, GFR 30-59 ml/min  Creatine stable for now. BMP reviewed- noted Estimated Creatinine Clearance: 45.1 mL/min (based on SCr of 1 mg/dL). according to latest data. Based on current GFR, CKD stage is stage 3 - GFR 30-59.  Monitor UOP and serial BMP and adjust  therapy as needed. Renally dose meds. Avoid nephrotoxic medications and procedures.    Renal lab values as of (8/5/24) are within normal range    Plan:   - Strict I&Os and daily weights   - Trend Cr  - Strict I&Os  - Avoid nephrotoxic agents (NSAIDs, ACEi/Arbs, aminoglycoside abx, zozyn, amphotericin, tenofovir)  - Renally adjust medications (metformin, gabapentin, cefepime, morphine)    Secondary hyperparathyroidism of renal origin  Noted. Ca WNL    Essential hypertension  Chronic, uncontrolled. Latest blood pressure and vitals reviewed-     Temp:  [97.5 °F (36.4 °C)-98.3 °F (36.8 °C)]   Pulse:  [66-81]   Resp:  [16-18]   BP: ()/(64-82)   SpO2:  [92 %-95 %] .   Home meds for hypertension were reviewed and noted below.   Hypertension Medications               furosemide (LASIX) 40 MG tablet Take 1 tablet (40 mg total) by mouth once daily.    metoprolol succinate (TOPROL-XL) 100 MG 24 hr tablet Take 1 tablet (100 mg total) by mouth once daily.          While in the hospital, will manage blood pressure as follows; Continue home antihypertensive regimen and start Losartan      VTE Risk Mitigation (From admission, onward)           Ordered     apixaban tablet 5 mg  2 times daily         08/02/24 1910     Reason for No Pharmacological VTE Prophylaxis  Once        Comments: Anticipate possible procerdure   Question:  Reasons:  Answer:  Physician Provided (leave comment)    07/27/24 0430     IP VTE HIGH RISK PATIENT  Once         07/27/24 0430     Place sequential compression device  Until discontinued         07/27/24 0430                    Discharge Planning   NOHEMI: 8/12/2024     Code Status: Full Code   Is the patient medically ready for discharge?:     Reason for patient still in hospital (select all that apply): {HMREASONPATIENTINHOSP:[id number]}  Discharge Plan A: Skilled Nursing Facility                  Freddy Pinedo MD  Department of Hospital Medicine   [facility] - Med Surg ([place]-[facility])

## 2024-08-12 NOTE — PT/OT/SLP PROGRESS
Occupational Therapy   Treatment    Name: Misa Barajas  MRN: [id number]  Admitting Diagnosis:  Acute pancreatitis  13 Days Post-Op    Recommendations:     Discharge Recommendations: Moderate Intensity Therapy  Discharge Equipment Recommendations:  walker, rolling, grab bar  Barriers to discharge:  Other (Comment) (increased A required)    Assessment:     Misa Barajas is a 78 y.o. female with a medical diagnosis of Acute pancreatitis.  She presents with performance deficits affecting function are weakness, impaired endurance, impaired functional mobility, impaired self care skills, impaired balance, gait instability, decreased lower extremity function, decreased upper extremity function, decreased coordination, decreased safety awareness, impaired cardiopulmonary response to activity. Pt presents A&O x4 , and agreeable to tx. Pt participated well overall given contact-guard assist t/o session focused on Grooming, functional transfers, and therapeutic exercise to support strength and activity tolerance.     Rehab Prognosis:  Good; patient would benefit from acute skilled OT services to address these deficits and reach maximum level of function.       Plan:     Patient to be seen 4 x/week to address the above listed problems via self-care/home management, therapeutic exercises, neuromuscular re-education  Plan of Care Expires: 08/28/24  Plan of Care Reviewed with: patient    Subjective     Chief Complaint: SOB  Patient/Family Comments/goals: To go home  Pain/Comfort:  Pain Rating 1: 0/10  Pain Rating Post-Intervention 1: 0/10    Objective:     Communicated with: Nsg prior to session.  Patient found HOB elevated with peripheral IV, PureWick, oxygen, telemetry upon OT entry to room.    General Precautions: Standard, fall    Orthopedic Precautions:N/A  Braces: N/A  Respiratory Status: Nasal cannula     Occupational Performance:     Bed Mobility:    Patient completed Scooting/Bridging with stand by assistance  Patient  completed Supine to Sit with stand by assistance     Functional Mobility/Transfers:  Patient completed Bed <> Chair Transfer using Step Transfer technique with contact guard assistance with no assistive device  Functional Mobility: Defers further ambulation (to sinkside to groom) due to SOB    Activities of Daily Living:  Grooming: stand by assistance and setup to manage grooming from seated      [facility] 6 Click ADL: 17    Treatment & Education:  AROM provided to BUE in all planes of motion 1 x 10 reps. Completed to facilitate normalized movement to increase performance during functional activities.     B UE isometrics [id number] holds: shoulder extension,  bicep flexion, tricep extension against self resistance to tolerance to facilitate normalized movement for improved UE reaching and function for (I)ADLs and ease of mobility.      -Education on energy conservation and task modification to maximize safety and (I) during ADLs and mobility  -Education on importance of OOB activity to improve overall activity tolerance and promote recovery     Pt had no further questions & when asked whether there were any concerns pt reported none.   Patient left up in chair with all lines intact and call button in reach    GOALS:   Multidisciplinary Problems       Occupational Therapy Goals          Problem: Occupational Therapy    Goal Priority Disciplines Outcome Interventions   Occupational Therapy Goal     OT, PT/OT Progressing    Description: Goals to be met by: 8/28     Patient will increase functional independence with ADLs by performing:    UE Dressing with Stand-by Assistance.  LE Dressing with Stand-by Assistance.  Grooming while standing at sink with Contact Guard Assistance.  Sit>stand with RW from EOB with SBA  Supine to sit with SBA                       Time Tracking:     OT Date of Treatment: 08/12/24  OT Start Time: 1002  OT Stop Time: 1027  OT Total Time (min): 25 min    Billable Minutes:Self Care/Home Management  10  Therapeutic Exercise 15    OT/MARYA: OT          8/12/2024

## 2024-08-12 NOTE — PLAN OF CARE
Discharge Plan A and Plan B have been determined by review of patient's clinical status, future medical and therapeutic needs, and coverage/benefits for post-acute care in coordination with multidisciplinary team members.    08/12/24 1324   Post-Acute Status   Post-Acute Authorization Placement   Post-Acute Placement Status Pending post-acute provider review/more information requested   Coverage [employer] - [employer] MC[employer] ADV -   Discharge Plan   Discharge Plan A Skilled Nursing Facility   Discharge Plan B Home Health;Home with family     MARINE sent updated SNF orders to Kat ([facility]). MARINE phoned Kat to notify that orders have been sent and per MD, patient is medically ready for dc. Kat to review orders and notify MARINE when ready to accept patient. MARINE reviewed dc plan w/ daughter Marina and Marina agreeable to the plan.    2:50pm  Kat requesting updates to SNF orders regarding pt/ot and medications. MARINE notified HOSP MED team. MRAINE to resend orders once adjustments are made by MD.       3:30pm  MARINE sent updated orders and reviewed w/ [facility]. Kat confirmed that patient accepted to admit to SNF Today. MARINE placed PFC orders for patient transport to [facility]. Patient's daughter Marina notified and agreeable to dc plan. Report to be called to [phone],  [phone].                              SULEIMAN Phillips, LMSW  [facility]  Case Management  Ext. [phone]

## 2024-08-12 NOTE — PLAN OF CARE
Patient is doing well. She is in no distress. She had a bath today. Her yeast infection is getting a little better.

## 2024-08-12 NOTE — DISCHARGE SUMMARY
[facility] - Med Surg ([place] [address])  [facility] Medicine  Discharge Summary      Patient Name: Misa Barajas  MRN: [id number]  ARTHUR: [id number]  Patient Class: IP- Inpatient  Admission Date: 7/26/2024  Hospital Length of Stay: 16 days  Discharge Date and Time:  08/12/2024 4:13 PM  Attending Physician: Jose Brizuela MD   Discharging Provider: Freddy Pinedo MD  Primary Care Provider: Ceila Carlisle MD  Hospital Medicine Team: [facility] HOSP MED 5 Freddy Pinedo MD  Primary Care Team: [facility] 5    HPI:   Misa Barajas is a 78F with a PMHx of HTN, HLD, HFpEF, COPD (on 2L NC baseline O2 sat 92%) and pAF who presented to [facility] 7/26 for acute onset abdominal pain that radiated to her back. States she has RUQ and epigastric pain that started acutely this afternoon. She rates her pain as 10/10 intensity, sharp and non-pleuritic in nature. She reports a past history of nephrolithiasis but states that this pain is different. Pain is not post-prandial in nature and explains that this is differen than her past episodes of nephrolithiasis. SOB started at around thesame time and she normally is on 2L/min of at home oxygen due to her COPD. She states that she was watching TV when the pain suddenly started. She denies nausea, vomiting, fever, chills, or LUTS including hamturia or dysuria. Recent admission 7/12-7/18 for pasturella bactermia d/t cat bite, completed 2wk CTX (EOT 7/26, midline removed 7/26). No recent changes in appetite reported. She also denies diarrhea, constipation.     In ED, Slightly hypertensive 172/83, no tachycardia, on 2L NC saturating 98%, afebrile. CBC with slightly worsened anemia (Hb 8.9, baseline seems approx 10), no leukocytosis. CMP with hypokalemia K 3.4, hypercalcemia (corrected 11.5), Alb 2.6, LFT elevation including T. Bili 1.1, AST//54, . Lipase >3000. , troponin normal. CTA (obtained for aortic dissection r/o) showed cholelithiasis Distended BG with pericholecystic  fluid and cholelithiasis. No intrahepatic or extrahepatic biliary ductal dilatation. CTA showed cholelithiasis so we obtained a right upper quadrant ultrasound as well that confirmed the cholelithiasis and could not necessarily rule out cholecystitis. Gave CTX and 1L IVF.     Procedure(s) (LRB):  ERCP (ENDOSCOPIC RETROGRADE CHOLANGIOPANCREATOGRAPHY) (N/A)      Hospital Course:   Patient admitted for pancreatitis 2/2 likely blocked duct. CTA Abdomen/pelvis (7/26/24) revealed non-obstruction renal calculi, cholelithiasis, possible early cholecystitis, no signs of choledocholithiasis, and stranding in the pancreas suggestive of pancreatits. MRCP on 7/28/24 showed cholelithiasis and pancreatitis without choledocolithiasis or cholecystitis. General surgery consulted and recommended HIDA to definitively rule out Cholecystitis. If HIDA is negative, general surgery recommends an ERCP with sphincterotomy instead of a cholecystectomy due to co-morbidities increasing surgical risk. HIDA scan was negative for cholecystitis. Patient underwent an ERCP with sphincterotomy 7/30/24. Pain controlled, PT/OT with recommendations for moderate intensity therapy. SW aware. Pain has greatly improved and there there is no longer reported tenderness. Patient with persistent leukocytosis, given patient AF, physical exam unrevealing for source of infection, likely stress induced but will consider referral to hematology outpatient to make sure no other underlying issues are present. Leukocytes returned to within normal limits on 8//9/24. Patient developed and is being treated for a yeast infection.      Goals of Care Treatment Preferences:  Code Status: Full Code    Physical Exam  Constitutional:       Appearance: Normal appearance.   Eyes:      General: No scleral icterus.        Right eye: No discharge.         Left eye: No discharge.      Extraocular Movements: Extraocular movements intact.      Conjunctiva/sclera: Conjunctivae normal.    Cardiovascular:      Rate and Rhythm: Normal rate and regular rhythm.      Pulses: Normal pulses.      Heart sounds: No murmur heard.     No friction rub. No gallop.   Pulmonary:      Effort: No respiratory distress.      Breath sounds: No stridor. No wheezing, rhonchi or rales.   Chest:      Chest wall: No tenderness.   Abdominal:      General: Abdomen is flat. Bowel sounds are normal.      Palpations: Abdomen is soft. There is no mass.      Tenderness: There is no abdominal tenderness.   Musculoskeletal:      Right lower leg: No edema.      Left lower leg: No edema.   Skin:     General: Skin is warm and dry.   Neurological:      General: No focal deficit present.      Mental Status: She is alert and oriented to person, place, and time.   Psychiatric:         Mood and Affect: Mood normal.         Behavior: Behavior normal.         Thought Content: Thought content normal.        SDOH Screening:  The patient was screened for utility difficulties, food insecurity, transport difficulties, housing insecurity, and interpersonal safety and there were no concerns identified this admission.     Consults:   Consults (From admission, onward)          Status Ordering Provider     Inpatient consult to PICC team ([facility])  Once        Provider:  (Not yet assigned)    Completed HALEY DAVIES     Inpatient consult to PICC team ([facility])  Once        Provider:  (Not yet assigned)    Completed HALEY DAVIES     Inpatient consult to General Surgery  Once        Provider:  (Not yet assigned)    Completed JESENIA GAMEZ     Inpatient consult to [facility] ([facility])  Once        Provider:  (Not yet assigned)    Completed ROBERT PEDRAZA            No new Assessment & Plan notes have been filed under this hospital service since the last note was generated.  Service: Hospital Medicine    Final Active Diagnoses:    Diagnosis Date Noted POA    PRINCIPAL PROBLEM:  Acute pancreatitis [K85.90] 07/27/2024 Yes    Yeast infection  [B37.9] 08/09/2024 No    Left arm swelling [M79.89] 08/07/2024 Yes    Leukocytosis [D72.829] 08/06/2024 Yes    COPD (chronic obstructive pulmonary disease) [J44.9] 08/22/2022 Yes    Chronic diastolic heart failure [I50.32] 12/05/2021 Yes    Chronic respiratory failure with hypoxia [J96.11] 06/26/2021 Yes     Chronic    Paroxysmal atrial fibrillation [I48.0] 06/07/2021 Yes    CKD (chronic kidney disease) stage 3, GFR 30-59 ml/min [N18.30] 02/14/2018 Yes     Chronic    Secondary hyperparathyroidism of renal origin [N25.81] 11/27/2015 Yes    Essential hypertension [I10] 05/28/2015 Yes     Chronic      Problems Resolved During this Admission:       Discharged Condition: good    Disposition:     Follow Up:   Follow-up Information       Schedule an appointment as soon as possible for a visit  with Celia Carlisle MD.    Specialty: Internal Medicine  Contact information:  [address] [facility] [address] [phone]                           Patient Instructions:   No discharge procedures on file.    Significant Diagnostic Studies: Labs: All labs within the past 24 hours have been reviewed  Radiology: All reviewed    Pending Diagnostic Studies:       Procedure Component Value Units Date/Time    Comprehensive Metabolic Panel (CMP) [[id number]] Collected: 07/29/24 0612    Order Status: Sent Lab Status: No result     Specimen: Blood     Magnesium [[id number]] Collected: 07/29/24 0612    Order Status: Sent Lab Status: No result     Specimen: Blood     Phosphorus [[id number]] Collected: 07/29/24 0612    Order Status: Sent Lab Status: No result     Specimen: Blood            Medications:  Reconciled Home Medications:      Medication List        START taking these medications      losartan 50 MG tablet  Commonly known as: COZAAR  Take 1 tablet (50 mg total) by mouth once daily.            CHANGE how you take these medications      ELIQUIS 5 mg Tab  Generic drug: apixaban  Take 1 tablet (5 mg total) by mouth 2 (two)  times daily.  What changed:   medication strength  how much to take  when to take this            CONTINUE taking these medications      acetaminophen 500 MG tablet  Commonly known as: TYLENOL  Take 2 tablets (1,000 mg total) by mouth every 8 (eight) hours as needed for Pain.     albuterol 90 mcg/actuation inhaler  Commonly known as: PROVENTIL HFA  Inhale 2 puffs into the lungs every 6 (six) hours as needed for Wheezing. Rescue     aspirin 81 MG EC tablet  Commonly known as: ECOTRIN  Take 81 mg by mouth once daily.     biotin 1 mg tablet  Take 1,000 mcg by mouth once daily.     BREZTRI AEROSPHERE 160-9-4.8 mcg/actuation Hfaa  Generic drug: budesonide-glycopyr-formoterol  Inhale 2 puffs into the lungs 2 (two) times daily.     busPIRone 5 MG Tab  Commonly known as: BUSPAR  Take 5 mg by mouth daily as needed (anxiety).     fluticasone propionate 50 mcg/actuation nasal spray  Commonly known as: FLONASE  1 spray by Each Nostril route daily as needed for Rhinitis or Allergies.     furosemide 40 MG tablet  Commonly known as: LASIX  Take 1 tablet (40 mg total) by mouth once daily.     inhalation spacing device  Use as directed for inhalation.     metoprolol succinate 100 MG 24 hr tablet  Commonly known as: TOPROL-XL  Take 1 tablet (100 mg total) by mouth once daily.     vitamin D 1000 units Tab  Commonly known as: VITAMIN D3  Take 2 tablets (2,000 Units total) by mouth once daily.            STOP taking these medications      D5W PgBk 100 mL with cefTRIAXone 2 gram SolR 2 g              Indwelling Lines/Drains at time of discharge:   Lines/Drains/Airways       Drain  Duration             Female External Urinary Catheter w/ Suction 07/27/24 0614 16 days                    Time spent on the discharge of patient: 45 minutes         Freddy Pinedo MD  Department of Hospital Medicine  [facility] Surg ([place]-16)

## 2024-08-13 ENCOUNTER — DOCUMENT SCAN (OUTPATIENT)
Dept: HOME HEALTH SERVICES | Facility: HOSPITAL | Age: 79
End: 2024-08-13
Payer: MEDICARE

## 2024-08-13 NOTE — PLAN OF CARE
Isaias Tobias - Med Surg ([facility]-16)  Discharge Final Note    Primary Care Provider: Celia Carlisle MD    Expected Discharge Date: 8/12/2024    Final Discharge Note (most recent)       Final Note - 08/13/24 0941          Final Note    Assessment Type Final Discharge Note (P)      Anticipated Discharge Disposition Skilled Nursing Facility (P)      What phone number can be called within the next 1-3 days to see how you are doing after discharge? [phone] (P)         Post-Acute Status    Post-Acute Authorization Placement (P)      Post-Acute Placement Status Set-up Complete/Auth obtained (P)      Coverage [employer]ARE [employer] - [employer] MC[employer] ADV - (P)                      Important Message from Medicare  Important Message from Medicare regarding Discharge Appeal Rights: Given to patient/caregiver, Signed/date by patient/caregiver, Explained to patient/caregiver     Date IMM was signed: 07/31/24  Time IMM was signed: 1016    Contact Info       eClia Carlisle MD   Specialty: Internal Medicine   Relationship: PCP - General  Consulting Physician    [address] [place] [address]  KARRIE ROSADO [id number]   Phone: [phone]       Next Steps: Schedule an appointment as soon as possible for a visit            Patient discharged to [facility] via [facility].                    SULEIMAN Phillips, LMSW  [facility]  Case Management  Ext. [phone]

## 2024-08-14 ENCOUNTER — DOCUMENT SCAN (OUTPATIENT)
Dept: HOME HEALTH SERVICES | Facility: HOSPITAL | Age: 79
End: 2024-08-14
Payer: MEDICARE

## 2024-08-23 ENCOUNTER — PATIENT MESSAGE (OUTPATIENT)
Dept: CARDIOLOGY | Facility: CLINIC | Age: 79
End: 2024-08-23
Payer: MEDICARE

## 2024-08-23 ENCOUNTER — PATIENT MESSAGE (OUTPATIENT)
Dept: ADMINISTRATIVE | Facility: HOSPITAL | Age: 79
End: 2024-08-23
Payer: MEDICARE

## 2024-08-26 ENCOUNTER — HOSPITAL ENCOUNTER (INPATIENT)
Facility: HOSPITAL | Age: 79
LOS: 7 days | Discharge: HOME | DRG: 312 | End: 2024-09-03
Attending: EMERGENCY MEDICINE | Admitting: FAMILY MEDICINE
Payer: MEDICARE

## 2024-08-26 DIAGNOSIS — R07.9 CHEST PAIN: ICD-10-CM

## 2024-08-26 DIAGNOSIS — R42 DIZZINESS: ICD-10-CM

## 2024-08-26 DIAGNOSIS — R55 SYNCOPE: ICD-10-CM

## 2024-08-26 DIAGNOSIS — I95.9 HYPOTENSION: ICD-10-CM

## 2024-08-26 DIAGNOSIS — R00.0 TACHYCARDIA: ICD-10-CM

## 2024-08-26 DIAGNOSIS — I95.1 ORTHOSTATIC HYPOTENSION: Primary | ICD-10-CM

## 2024-08-26 PROBLEM — Z71.89 ACP (ADVANCE CARE PLANNING): Status: ACTIVE | Noted: 2017-04-24

## 2024-08-26 PROBLEM — N17.9 ACUTE RENAL FAILURE SUPERIMPOSED ON STAGE 3B CHRONIC KIDNEY DISEASE: Status: RESOLVED | Noted: 2022-05-02 | Resolved: 2024-08-26

## 2024-08-26 PROBLEM — N18.32 ACUTE RENAL FAILURE SUPERIMPOSED ON STAGE 3B CHRONIC KIDNEY DISEASE: Status: RESOLVED | Noted: 2022-05-02 | Resolved: 2024-08-26

## 2024-08-26 LAB
ABO + RH BLD: NORMAL
ALBUMIN SERPL BCP-MCNC: 2.3 G/DL (ref 3.5–5.2)
ALP SERPL-CCNC: 121 U/L (ref 55–135)
ALT SERPL W/O P-5'-P-CCNC: 24 U/L (ref 10–44)
ANION GAP SERPL CALC-SCNC: 9 MMOL/L (ref 8–16)
AST SERPL-CCNC: 40 U/L (ref 10–40)
BASOPHILS # BLD AUTO: 0.07 K/UL (ref 0–0.2)
BASOPHILS NFR BLD: 0.7 % (ref 0–1.9)
BILIRUB SERPL-MCNC: 0.9 MG/DL (ref 0.1–1)
BLD GP AB SCN CELLS X3 SERPL QL: NORMAL
BLD PROD TYP BPU: NORMAL
BLOOD UNIT EXPIRATION DATE: NORMAL
BLOOD UNIT TYPE CODE: 5100
BLOOD UNIT TYPE: NORMAL
BUN SERPL-MCNC: 26 MG/DL (ref 8–23)
CALCIUM SERPL-MCNC: 10.5 MG/DL (ref 8.7–10.5)
CHLORIDE SERPL-SCNC: 105 MMOL/L (ref 95–110)
CO2 SERPL-SCNC: 30 MMOL/L (ref 23–29)
CODING SYSTEM: NORMAL
CREAT SERPL-MCNC: 1.1 MG/DL (ref 0.5–1.4)
CROSSMATCH INTERPRETATION: NORMAL
DIFFERENTIAL METHOD BLD: ABNORMAL
DISPENSE STATUS: NORMAL
EOSINOPHIL # BLD AUTO: 0.2 K/UL (ref 0–0.5)
EOSINOPHIL NFR BLD: 1.8 % (ref 0–8)
ERYTHROCYTE [DISTWIDTH] IN BLOOD BY AUTOMATED COUNT: 18.1 % (ref 11.5–14.5)
EST. GFR  (NO RACE VARIABLE): 50.8 ML/MIN/1.73 M^2
GLUCOSE SERPL-MCNC: 113 MG/DL (ref 70–110)
HCT VFR BLD AUTO: 26.6 % (ref 37–48.5)
HGB BLD-MCNC: 8.2 G/DL (ref 12–16)
IMM GRANULOCYTES # BLD AUTO: 0.06 K/UL (ref 0–0.04)
IMM GRANULOCYTES NFR BLD AUTO: 0.6 % (ref 0–0.5)
IRON SERPL-MCNC: 42 UG/DL (ref 30–160)
LACTATE SERPL-SCNC: 1.5 MMOL/L (ref 0.5–2.2)
LYMPHOCYTES # BLD AUTO: 1.8 K/UL (ref 1–4.8)
LYMPHOCYTES NFR BLD: 16.9 % (ref 18–48)
MCH RBC QN AUTO: 29.4 PG (ref 27–31)
MCHC RBC AUTO-ENTMCNC: 30.8 G/DL (ref 32–36)
MCV RBC AUTO: 95 FL (ref 82–98)
MONOCYTES # BLD AUTO: 1 K/UL (ref 0.3–1)
MONOCYTES NFR BLD: 9.9 % (ref 4–15)
NEUTROPHILS # BLD AUTO: 7.4 K/UL (ref 1.8–7.7)
NEUTROPHILS NFR BLD: 70.1 % (ref 38–73)
NRBC BLD-RTO: 0 /100 WBC
PLATELET # BLD AUTO: 301 K/UL (ref 150–450)
PMV BLD AUTO: 9.8 FL (ref 9.2–12.9)
POTASSIUM SERPL-SCNC: 3.7 MMOL/L (ref 3.5–5.1)
PROT SERPL-MCNC: 5.8 G/DL (ref 6–8.4)
RBC # BLD AUTO: 2.79 M/UL (ref 4–5.4)
RETICS/RBC NFR AUTO: 5.5 % (ref 0.5–2.5)
SATURATED IRON: 14 % (ref 20–50)
SODIUM SERPL-SCNC: 144 MMOL/L (ref 136–145)
SPECIMEN OUTDATE: NORMAL
TOTAL IRON BINDING CAPACITY: 308 UG/DL (ref 250–450)
TRANS ERYTHROCYTES VOL PATIENT: NORMAL ML
TRANSFERRIN SERPL-MCNC: 208 MG/DL (ref 200–375)
TSH SERPL DL<=0.005 MIU/L-ACNC: 4.41 UIU/ML (ref 0.4–4)
VIT B12 SERPL-MCNC: >2000 PG/ML (ref 210–950)
WBC # BLD AUTO: 10.51 K/UL (ref 3.9–12.7)

## 2024-08-26 PROCEDURE — 86920 COMPATIBILITY TEST SPIN: CPT | Performed by: EMERGENCY MEDICINE

## 2024-08-26 PROCEDURE — 94761 N-INVAS EAR/PLS OXIMETRY MLT: CPT

## 2024-08-26 PROCEDURE — 85045 AUTOMATED RETICULOCYTE COUNT: CPT | Performed by: EMERGENCY MEDICINE

## 2024-08-26 PROCEDURE — 84443 ASSAY THYROID STIM HORMONE: CPT | Performed by: STUDENT IN AN ORGANIZED HEALTH CARE EDUCATION/TRAINING PROGRAM

## 2024-08-26 PROCEDURE — 83540 ASSAY OF IRON: CPT | Performed by: EMERGENCY MEDICINE

## 2024-08-26 PROCEDURE — 96361 HYDRATE IV INFUSION ADD-ON: CPT

## 2024-08-26 PROCEDURE — P9021 RED BLOOD CELLS UNIT: HCPCS | Performed by: EMERGENCY MEDICINE

## 2024-08-26 PROCEDURE — 93005 ELECTROCARDIOGRAM TRACING: CPT

## 2024-08-26 PROCEDURE — 93010 ELECTROCARDIOGRAM REPORT: CPT | Mod: ,,, | Performed by: INTERNAL MEDICINE

## 2024-08-26 PROCEDURE — 96374 THER/PROPH/DIAG INJ IV PUSH: CPT

## 2024-08-26 PROCEDURE — 82607 VITAMIN B-12: CPT | Performed by: EMERGENCY MEDICINE

## 2024-08-26 PROCEDURE — 81001 URINALYSIS AUTO W/SCOPE: CPT | Performed by: STUDENT IN AN ORGANIZED HEALTH CARE EDUCATION/TRAINING PROGRAM

## 2024-08-26 PROCEDURE — 36430 TRANSFUSION BLD/BLD COMPNT: CPT

## 2024-08-26 PROCEDURE — 87040 BLOOD CULTURE FOR BACTERIA: CPT | Mod: 59 | Performed by: STUDENT IN AN ORGANIZED HEALTH CARE EDUCATION/TRAINING PROGRAM

## 2024-08-26 PROCEDURE — 86900 BLOOD TYPING SEROLOGIC ABO: CPT | Performed by: EMERGENCY MEDICINE

## 2024-08-26 PROCEDURE — 30233N1 TRANSFUSION OF NONAUTOLOGOUS RED BLOOD CELLS INTO PERIPHERAL VEIN, PERCUTANEOUS APPROACH: ICD-10-PCS | Performed by: EMERGENCY MEDICINE

## 2024-08-26 PROCEDURE — 99285 EMERGENCY DEPT VISIT HI MDM: CPT | Mod: 25

## 2024-08-26 PROCEDURE — 25000003 PHARM REV CODE 250: Performed by: EMERGENCY MEDICINE

## 2024-08-26 PROCEDURE — G0378 HOSPITAL OBSERVATION PER HR: HCPCS

## 2024-08-26 PROCEDURE — 83605 ASSAY OF LACTIC ACID: CPT | Performed by: EMERGENCY MEDICINE

## 2024-08-26 PROCEDURE — 85025 COMPLETE CBC W/AUTO DIFF WBC: CPT | Performed by: EMERGENCY MEDICINE

## 2024-08-26 PROCEDURE — 80053 COMPREHEN METABOLIC PANEL: CPT | Performed by: EMERGENCY MEDICINE

## 2024-08-26 PROCEDURE — 93010 ELECTROCARDIOGRAM REPORT: CPT | Mod: 76,,, | Performed by: INTERNAL MEDICINE

## 2024-08-26 PROCEDURE — 84439 ASSAY OF FREE THYROXINE: CPT | Performed by: STUDENT IN AN ORGANIZED HEALTH CARE EDUCATION/TRAINING PROGRAM

## 2024-08-26 RX ORDER — FLUTICASONE FUROATE AND VILANTEROL 100; 25 UG/1; UG/1
1 POWDER RESPIRATORY (INHALATION) DAILY
Status: DISCONTINUED | OUTPATIENT
Start: 2024-08-27 | End: 2024-09-03 | Stop reason: HOSPADM

## 2024-08-26 RX ORDER — PANTOPRAZOLE SODIUM 40 MG/10ML
40 INJECTION, POWDER, LYOPHILIZED, FOR SOLUTION INTRAVENOUS 2 TIMES DAILY
Status: DISCONTINUED | OUTPATIENT
Start: 2024-08-27 | End: 2024-08-28

## 2024-08-26 RX ORDER — ALBUTEROL SULFATE 90 UG/1
2 INHALANT RESPIRATORY (INHALATION) EVERY 6 HOURS PRN
Status: DISCONTINUED | OUTPATIENT
Start: 2024-08-26 | End: 2024-09-03 | Stop reason: HOSPADM

## 2024-08-26 RX ORDER — IBUPROFEN 200 MG
24 TABLET ORAL
Status: DISCONTINUED | OUTPATIENT
Start: 2024-08-26 | End: 2024-09-03 | Stop reason: HOSPADM

## 2024-08-26 RX ORDER — PANTOPRAZOLE SODIUM 40 MG/10ML
80 INJECTION, POWDER, LYOPHILIZED, FOR SOLUTION INTRAVENOUS ONCE
Status: COMPLETED | OUTPATIENT
Start: 2024-08-26 | End: 2024-08-27

## 2024-08-26 RX ORDER — SODIUM CHLORIDE 0.9 % (FLUSH) 0.9 %
10 SYRINGE (ML) INJECTION EVERY 12 HOURS PRN
Status: DISCONTINUED | OUTPATIENT
Start: 2024-08-26 | End: 2024-09-03 | Stop reason: HOSPADM

## 2024-08-26 RX ORDER — IBUPROFEN 200 MG
16 TABLET ORAL
Status: DISCONTINUED | OUTPATIENT
Start: 2024-08-26 | End: 2024-09-03 | Stop reason: HOSPADM

## 2024-08-26 RX ORDER — SODIUM CHLORIDE 0.9 % (FLUSH) 0.9 %
10 SYRINGE (ML) INJECTION
Status: DISCONTINUED | OUTPATIENT
Start: 2024-08-26 | End: 2024-09-03 | Stop reason: HOSPADM

## 2024-08-26 RX ORDER — TALC
6 POWDER (GRAM) TOPICAL NIGHTLY PRN
Status: DISCONTINUED | OUTPATIENT
Start: 2024-08-26 | End: 2024-09-03 | Stop reason: HOSPADM

## 2024-08-26 RX ORDER — PANTOPRAZOLE SODIUM 40 MG/10ML
40 INJECTION, POWDER, LYOPHILIZED, FOR SOLUTION INTRAVENOUS 2 TIMES DAILY
Status: DISCONTINUED | OUTPATIENT
Start: 2024-08-26 | End: 2024-08-26

## 2024-08-26 RX ORDER — GLUCAGON 1 MG
1 KIT INJECTION
Status: DISCONTINUED | OUTPATIENT
Start: 2024-08-26 | End: 2024-09-03 | Stop reason: HOSPADM

## 2024-08-26 RX ORDER — MECLIZINE HYDROCHLORIDE 25 MG/1
25 TABLET ORAL 3 TIMES DAILY PRN
Status: ON HOLD | COMMUNITY
End: 2024-09-02 | Stop reason: HOSPADM

## 2024-08-26 RX ORDER — HYDROCODONE BITARTRATE AND ACETAMINOPHEN 500; 5 MG/1; MG/1
TABLET ORAL
Status: DISCONTINUED | OUTPATIENT
Start: 2024-08-26 | End: 2024-09-03 | Stop reason: HOSPADM

## 2024-08-26 RX ORDER — NALOXONE HCL 0.4 MG/ML
0.02 VIAL (ML) INJECTION
Status: DISCONTINUED | OUTPATIENT
Start: 2024-08-26 | End: 2024-09-03 | Stop reason: HOSPADM

## 2024-08-26 RX ORDER — MIDODRINE HYDROCHLORIDE 5 MG/1
10 TABLET ORAL 3 TIMES DAILY
Status: ON HOLD | COMMUNITY
End: 2024-09-02 | Stop reason: HOSPADM

## 2024-08-26 RX ADMIN — SODIUM CHLORIDE 500 ML: 9 INJECTION, SOLUTION INTRAVENOUS at 03:08

## 2024-08-26 RX ADMIN — SODIUM CHLORIDE 500 ML: 9 INJECTION, SOLUTION INTRAVENOUS at 07:08

## 2024-08-26 NOTE — ED NOTES
MD Kevin at bedside for ZAHEER. Pt tolerated well.     Patient with soiled diaper and linen. Perineal care performed with application of new linen at this time. Clean mepilex applied to patient's buttocks. Care ongoing.

## 2024-08-26 NOTE — ED NOTES
"C/C: 78 y.o. female arrived to the ED via EMS for c/o dizziness. Patient presents from [facility] following acute dizziness at approximately 0710 this morning, 8/26/24. Patient states she was standing while showering this morning when she felt dizzy and as if she were going to "pass out." Patient states she was assisted in bed by staff following symptomatic event. Per patient's daughter, patient has dealt with intermittent orthostatic events since May 2024 and reportedly on medication to keep BP elevated. Patient denies any complaints at this time.    APPEARANCE: ill-appearing. awake, alert, and appears to be in mild discomfort. Pain score 0/10. Placed on cont cardiac monitoring, auto BP cuff, cont pulse ox, and changed into hospital gown. Bed in lowest and locked position w/ side rails up x2. Call light w/n pt reach and instructed on how to use. Supportive daughter at bedside.   SKIN: warm and dry. +scattered bruising (maroon discoloration) to BUE. Moisture associated dermatitis with erythema to buttocks. +hyperpigmentation to distal end of BLE  MUSCULOSKELETAL: +generalized weakness. Patient moving all extremities spontaneously, no obvious swelling or deformities noted. +wheelchair assist   RESPIRATORY: Airway open and patent. Denies shortness of breath. Respirations even, unlabored, equal bilaterally on inspiration and expiration. On 2L of O2 via NC at baseline    CARDIAC: Regular HR. Denies CP, 2+ radial pulses; no peripheral edema  ABDOMEN: S/ND/NT, Denies N/V/D. +decrease in appetite.   : Pt voids spontaneously, denies difficulty  NEUROLOGIC: AAO x 4; speaking and following commands appropriately. Equal strength in all extremities; denies numbness/tingling. Denies dizziness, lightheadedness, visual changes, or HA  "

## 2024-08-26 NOTE — ED PROVIDER NOTES
Encounter Date: 8/26/2024       History     Chief Complaint   Patient presents with    Dizziness     EMS from [facility]. Pt became dizzy this morning, when standing up, found to be orthostatic at therapy.      HPI  79 y/o F with medical history of HTN, HLD, asthma/COPD (per discharge note on 2L nasal cannula basline with O2 sat 92%, CKD and HFpEF presents from [facility] for assessment of persistent dizziness and hypotension. Pt states whe nshe stands up she feels dizzy like she is going to pass out, No dizziness as rest. Pt has no other focal complaints. No chest pain. No shortness of breath, No n/v/d. No bloody stools. No abdominal pain. No dysuria. Has a tendency to LE swelling but states swelling is good now.    Pt had recent admission for gallstone pancreatitis, had ERCP with sphincterotomy on 7/30/24 and was discharged to snf for rehab on 8/9/24. Per daughter on discharge she was walking down [address] with walker but was still somewhat debilitated and doctors recommended rehab with a goal to return to independent living.    Since at [facility] pt has had a problem with dizziness - when she stands. Daughters state her blood pressures are low when she stands up. Progressively worsening and pt now not able to participate in PT because she cannot stand up.    Per Staff at [facility] orthostatic hypotension has been an issue since she was admitted there. Her BP meds have all been stopped for over 2 weeks, she has been given IVF hydration (1L) and her PA started midodrine and meclizine which she has been taking for a week now. This mornign her BP lying was 121?82 with puls 84 and they got her to to shower and she passed out- BP 82/40 so they sent her to ED.    Per daughter did have a couple of episodes of loose stool last week.    Review of patient's allergies indicates:   Allergen Reactions    Adhesive tape-silicones     Adhesive Rash     Pt states she removed a LATEX bandaid and the skin beneath was  swollen and red. No other latex causes a reaction.     Past Medical History:   Diagnosis Date    Acute biliary pancreatitis 7/27/2024    Acute hypoxemic respiratory failure 6/26/2021    Acute superficial venous thrombosis of lower extremity, bilateral 1/25/2022    Anemia 7/12/2024    Aortic atherosclerosis     Arthritis     knee joint pain    Asthma-COPD overlap syndrome 8/22/2022    Breast cancer 2002    left breast & lymph nodes-s/p sx with chemo    Bronchitis     with flu-2/2014    Cataracts, bilateral     Chronic anticoagulation 5/4/2022    Chronic diastolic heart failure 12/24/2019    Chronic kidney disease, stage 3     Class 1 obesity due to excess calories with serious comorbidity and body mass index (BMI) of 30.0 to 30.9 in adult 5/16/2024    History of chemotherapy     last treatment 12/2002 (had 8 treatments)    Hyperlipidemia 11/20/2016    Hypertension     Lymphedema of both lower extremities 1/25/2022    Nephrolithiasis 6/2/2014    Osteoporosis     Paroxysmal atrial fibrillation 6/7/2021    Renal calculi     hematuria    Renal osteodystrophy 1/14/2016    Venous stasis dermatitis of both lower extremities 1/25/2022    Vitamin D insufficiency      Past Surgical History:   Procedure Laterality Date    ABLATION, ATRIAL FLUTTER, TYPICAL N/A 6/17/2024    Procedure: Ablation, Atrial Flutter, Typical;  Surgeon: Taz Church MD;  Location: [facility] EP LAB;  Service: Cardiology;  Laterality: N/A;  AFL, RFA, LORENE, MAKAYLA (cx if SR), anes, MB, 3 Prep    BREAST BIOPSY Left 2002    core bx, +    BREAST BIOPSY Right 2018    core    BREAST BIOPSY Right 2019    BREAST LUMPECTOMY Left 2002    CYSTOSCOPY  4/28/2022    Procedure: CYSTOSCOPY;  Surgeon: Vik Ware MD;  Location: [facility] OR [address] [place] [address];  Service: Urology;;    CYSTOSCOPY  5/30/2022    Procedure: CYSTOSCOPY;  Surgeon: Bonnie Knutson MD;  Location: [facility] OR [address] [place] [address];  Service: Urology;;    ECHOCARDIOGRAM,TRANSESOPHAGEAL N/A 6/17/2024    Procedure:  Transesophageal echo (MAKAYLA) intra-procedure log documentation;  Surgeon: Nestor Martinez MD;  Location: [facility] EP LAB;  Service: Cardiology;  Laterality: N/A;    ERCP N/A 7/30/2024    Procedure: ERCP (ENDOSCOPIC RETROGRADE CHOLANGIOPANCREATOGRAPHY);  Surgeon: Sierra Cotto MD;  Location: [facility] ENDO (2ND FLR);  Service: Endoscopy;  Laterality: N/A;    LASER LITHOTRIPSY  5/30/2022    Procedure: LITHOTRIPSY, USING LASER;  Surgeon: Bonnie Knutson MD;  Location: [facility] OR 1ST FLR;  Service: Urology;;    LITHOTRIPSY      MASTECTOMY Left 06/2002    left-& lymph node dissection    REPLACEMENT OF STENT Right 5/30/2022    Procedure: REPLACEMENT, STENT;  Surgeon: Bonnie Knutson MD;  [facility];  Service: Urology;  Laterality: Right;    RETROGRADE PYELOGRAPHY Right 4/28/2022    Procedure: PYELOGRAM, RETROGRADE;  Surgeon: Vik Ware MD;  [facility];  Service: Urology;  Laterality: Right;    ROBOT-ASSISTED LAPAROSCOPIC PARTIAL NEPHRECTOMY USING DA JESÚS XI Right 3/11/2021    Procedure: XI ROBOTIC NEPHRECTOMY, PARTIAL;  Surgeon: Taz Ortega MD;  Location: [facility] OR 2ND FLR;  Service: Urology;  Laterality: Right;  4hr/ gen with regional  [place] confirmation:  [phone] for 11:15am case NC    URETEROSCOPIC REMOVAL OF URETERIC CALCULUS Right 5/30/2022    Procedure: REMOVAL, CALCULUS, URETER, URETEROSCOPIC;  Surgeon: Bonnie Knutson MD;  [facility];  Service: Urology;  Laterality: Right;    ureteroscopy Bilateral     6.14    URETEROSCOPY Right 5/30/2022    Procedure: URETEROSCOPY;  Surgeon: Bonnie Knutson MD;  [address]  Service: Urology;  Laterality: Right;     Family History   Problem Relation Name Age of Onset    Bone cancer Mother Bibi     Breast cancer Mother Bibi     Arthritis Mother Bibi         Breast Cancer    Breast cancer Sister      Cancer Father Edward         Asbestos cancer    No Known Problems Brother       Fibroids Daughter      Crohn's disease Daughter      No Known Problems Daughter      Arthritis Sister          Breast Cancer    Anesthesia problems Neg Hx      Glaucoma Neg Hx       Social History     Tobacco Use    Smoking status: Former     Current packs/day: 0.00     Average packs/day: 1 pack/day for 50.0 years (50.0 ttl pk-yrs)     Types: Cigarettes     Start date: 1960     Quit date: 5/20/2009     Years since quitting: 15.2    Smokeless tobacco: Former   Substance Use Topics    Alcohol use: No    Drug use: No       Physical Exam     Initial Vitals [08/26/24 1226]   BP Pulse Resp Temp SpO2   132/60 74 16 98 °F (36.7 °C) 97 %      MAP       --         Physical Exam    Nursing note and vitals reviewed.  Constitutional: She appears well-developed. She is not diaphoretic. No distress.   HENT:   Head: Normocephalic and atraumatic.   Eyes: Conjunctivae are normal. Pupils are equal, round, and reactive to light.   Neck: Neck supple. No JVD present.   Cardiovascular:  Normal rate and regular rhythm.           No murmur heard.  Pulmonary/Chest: Breath sounds normal. No respiratory distress. She has no wheezes.   Abdominal:   Soft, obese, NT/ND   Genitourinary: Rectum:      Guaiac result positive.   Guaiac positive stool. : Acceptable.   Genitourinary Comments: Firm brown stool on rectal      Musculoskeletal:      Cervical back: Neck supple.      Comments: Trace bialteral LE edema     Neurological: She is alert and oriented to person, place, and time. GCS score is 15. GCS eye subscore is 4. GCS verbal subscore is 5. GCS motor subscore is 6.   Skin: Skin is warm and dry. There is pallor.         ED Course   Procedures  Labs Reviewed   CBC W/ AUTO DIFFERENTIAL - Abnormal       Result Value    WBC 10.51      RBC 2.79 (*)     Hemoglobin 8.2 (*)     Hematocrit 26.6 (*)     MCV 95      MCH 29.4      MCHC 30.8 (*)     RDW 18.1 (*)     Platelets 301      MPV 9.8      Immature Granulocytes 0.6 (*)     Gran # (ANC)  7.4      Immature Grans (Abs) 0.06 (*)     Lymph # 1.8      Mono # 1.0      Eos # 0.2      Baso # 0.07      nRBC 0      Gran % 70.1      Lymph % 16.9 (*)     Mono % 9.9      Eosinophil % 1.8      Basophil % 0.7      Differential Method Automated     COMPREHENSIVE METABOLIC PANEL - Abnormal    Sodium 144      Potassium 3.7      Chloride 105      CO2 30 (*)     Glucose 113 (*)     BUN 26 (*)     Creatinine 1.1      Calcium 10.5      Total Protein 5.8 (*)     Albumin 2.3 (*)     Total Bilirubin 0.9      Alkaline Phosphatase 121      AST 40      ALT 24      eGFR 50.8 (*)     Anion Gap 9     RETICULOCYTES - Abnormal    Retic 5.5 (*)     Narrative:     Add on BONI to [id number] per Aria Brown on  08/26/2024  15:54    IRON AND TIBC - Abnormal    Iron 42      Transferrin 208      TIBC 308      Saturated Iron 14 (*)     Narrative:     Add on BONI to [id number] per Aria Brown on  08/26/2024  15:54     add on iron/tibc per Aria Brown MD order# [id number] 08/26/2024   @ 17:18    VITAMIN B12 - Abnormal    Vitamin B-12 >2000 (*)    URINALYSIS, REFLEX TO URINE CULTURE - Abnormal    Specimen UA Urine, Clean Catch      Color, UA Yellow      Appearance, UA Hazy (*)     pH, UA 7.0      Specific Gravity, UA 1.010      Protein, UA Negative      Glucose, UA Negative      Ketones, UA Negative      Bilirubin (UA) Negative      Occult Blood UA Negative      Nitrite, UA Negative      Leukocytes, UA 2+ (*)     Narrative:     Specimen Source->Urine   TSH - Abnormal    TSH 4.408 (*)    URINALYSIS MICROSCOPIC - Abnormal    RBC, UA 2      WBC, UA 6 (*)     Bacteria Rare      Squam Epithel, UA 5      Microscopic Comment SEE COMMENT      Narrative:     Specimen Source->Urine   RETICULOCYTES   FOLATE   VITAMIN B12   IRON AND TIBC   LACTIC ACID, PLASMA    Lactate (Lactic Acid) 1.5     T4, FREE    Free T4 0.88     TYPE & SCREEN    Group & Rh O POS      Indirect Tony NEG      Specimen Outdate 08/29/2024 23:59     PREPARE RBC SOFT     UNIT NUMBER [id number]      Product Code [id number]      DISPENSE STATUS TRANSFUSED      CODING SYSTEM [id number]      Unit Blood Type Code 5100      Unit Blood Type O POS      Unit Expiration [id number]      CROSSMATCH INTERPRETATION Compatible       EKG Readings: (Independently Interpreted)   Sinus rate 80, rBBB, no ALKA     ECG Results              EKG 12-lead (Final result)        Collection Time Result Time QRS Duration OHS QTC Calculation    08/26/24 14:24:58 08/27/24 01:20:10 162 530                     Final result by Interface, Lab In [facility] (08/27/24 01:20:16)                   Narrative:    Test Reason : R42,    Vent. Rate : 080 BPM     Atrial Rate : 080 BPM     P-R Int : 222 ms          QRS Dur : 162 ms      QT Int : 460 ms       P-R-T Axes : 100 -65 079 degrees     QTc Int : 530 ms    Sinus rhythm with 1st degree A-V block  Right bundle branch block  Left anterior fascicular block   Bifascicular block   LVH with repolarization abnormality  Abnormal ECG  When compared with ECG of 26-AUG-2024 12:49,  No significant change was found  Confirmed by Ron Bruno MD ([id number]) on 8/27/2024 1:20:09 AM    Referred By: [email or web address]   SELF           Confirmed By:Ron Bruno MD                                     EKG 12-lead (Final result)        Collection Time Result Time QRS Duration OHS QTC Calculation    08/26/24 12:49:57 08/27/24 01:32:44 160 527                     Final result by Interface, Lab In [facility] (08/27/24 01:32:49)                   Narrative:    Test Reason : EKG    Vent. Rate : 077 BPM     Atrial Rate : 077 BPM     P-R Int : 212 ms          QRS Dur : 160 ms      QT Int : 466 ms       P-R-T Axes : 102 -68 082 degrees     QTc Int : 527 ms    Sinus rhythm with 1st degree A-V block  Right bundle branch block  Left anterior fascicular block   Bifascicular block   LVH with repolarization abnormality  Abnormal ECG  When compared with ECG of 04-AUG-2024 05:16,  Premature atrial complexes are no longer  Present  Nonspecific T wave abnormality no longer evident in Inferior leads  T wave inversion now evident in Anterior leads  Increase in QRS voltage  Confirmed by Ron Bruno MD ([id number]) on 8/27/2024 1:32:41 AM    Referred By: AAAREFERR   SELF           Confirmed By:Ron Bruno MD                                         Medications   0.9%  NaCl infusion (for blood administration) (has no administration in time range)   sodium chloride 0.9% flush 10 mL (has no administration in time range)   melatonin tablet 6 mg (has no administration in time range)   albuterol inhaler 2 puff (has no administration in time range)   sodium chloride 0.9% flush 10 mL (has no administration in time range)   naloxone 0.4 mg/mL injection 0.02 mg (has no administration in time range)   glucose chewable tablet 16 g (has no administration in time range)   glucose chewable tablet 24 g (has no administration in time range)   glucagon (human recombinant) injection 1 mg (has no administration in time range)   dextrose 10% bolus 125 mL 125 mL (has no administration in time range)   dextrose 10% bolus 250 mL 250 mL (has no administration in time range)   fluticasone furoate-vilanteroL 100-25 mcg/dose diskus inhaler 1 puff (1 puff Inhalation Given 8/27/24 0917)   pantoprazole injection 40 mg (40 mg Intravenous Given by Other 8/27/24 2132)   tiotropium bromide 2.5 mcg/actuation inhaler 2 puff (has no administration in time range)   0.9 % NaCl with KCl 20 mEq infusion (0 mL/hr Intravenous Stopped 8/27/24 2119)   acetaminophen tablet 650 mg (650 mg Oral Given 8/27/24 1349)   apixaban tablet 5 mg (has no administration in time range)   sodium chloride 0.9% bolus 500 mL 500 mL (0 mLs Intravenous Stopped 8/26/24 1619)   sodium chloride 0.9% bolus 500 mL 500 mL (0 mLs Intravenous Stopped 8/26/24 2030)   pantoprazole injection 80 mg (80 mg Intravenous Given 8/27/24 0023)   potassium phosphate 15 mmol in D5W 250 mL infusion (0 mmol Intravenous Stopped 8/27/24  1521)   magnesium sulfate 2g in water 50mL IVPB (premix) (0 g Intravenous Stopped 8/27/24 1117)   perflutren protein-A microsphr 0.22 mg/mL IV susp (0.11 mg Intravenous Given 8/27/24 0915)   polyethylene glycol packet 17 g (17 g Oral Given 8/27/24 2120)     Medical Decision Making  77 y/o F with medical history of HTN, HLD, asthma/COPD (per discharge note on 2L nasal cannula basline with O2 sat 92%, CKD and HFpEF presents from SNF for persistent orthostatic hypotension. Orthostatic BP rechecked after IVF hydration and pt still markedly both orthostatic by symptoms and numbers with BP drop to systolic in 60s (see RN notes) Blood work sent to assess for anemia, MARQUEZ- Hb drop of 2 points from 10 at discharge to 8 today. Brown heme positive stool on ZAHEER- no obvious source of bleeding- possible oozing from sphincterotomy site (on eliquis). Anemia studies added on. Type and screen ordered and 1U PRBCs ordered for symptomatic anemia Otherwise unremarkable bloodwork. Pt admitted to hospital medicine for transfusion, hydration and further w/u of orthostatic hypotension. Lactate requested by HM and negative.    Amount and/or Complexity of Data Reviewed  External Data Reviewed: labs.  Labs: ordered. Decision-making details documented in ED Course.  ECG/medicine tests: ordered and independent interpretation performed. Decision-making details documented in ED Course.    Risk  OTC drugs.  Prescription drug management.                                Clinical Impression:  Final diagnoses:  [R42] Dizziness  [I95.9] Hypotension          ED Disposition Condition    Observation Stable                Aria Brown MD  08/28/24 6273

## 2024-08-27 PROBLEM — D62 ACUTE BLOOD LOSS ANEMIA: Status: ACTIVE | Noted: 2024-07-12

## 2024-08-27 LAB
ALBUMIN SERPL BCP-MCNC: 2.2 G/DL (ref 3.5–5.2)
ALP SERPL-CCNC: 119 U/L (ref 55–135)
ALT SERPL W/O P-5'-P-CCNC: 22 U/L (ref 10–44)
ANION GAP SERPL CALC-SCNC: 9 MMOL/L (ref 8–16)
ASCENDING AORTA: 2.96 CM
AST SERPL-CCNC: 34 U/L (ref 10–40)
AV INDEX (PROSTH): 0.6
AV MEAN GRADIENT: 7 MMHG
AV PEAK GRADIENT: 13 MMHG
AV VALVE AREA BY VELOCITY RATIO: 2.08 CM²
AV VALVE AREA: 1.99 CM²
AV VELOCITY RATIO: 0.63
BACTERIA #/AREA URNS AUTO: ABNORMAL /HPF
BASOPHILS # BLD AUTO: 0.06 K/UL (ref 0–0.2)
BASOPHILS NFR BLD: 0.5 % (ref 0–1.9)
BILIRUB SERPL-MCNC: 1.9 MG/DL (ref 0.1–1)
BILIRUB UR QL STRIP: NEGATIVE
BSA FOR ECHO PROCEDURE: 1.87 M2
BUN SERPL-MCNC: 25 MG/DL (ref 8–23)
CALCIUM SERPL-MCNC: 9.8 MG/DL (ref 8.7–10.5)
CHLORIDE SERPL-SCNC: 106 MMOL/L (ref 95–110)
CLARITY UR REFRACT.AUTO: ABNORMAL
CO2 SERPL-SCNC: 28 MMOL/L (ref 23–29)
COLOR UR AUTO: YELLOW
CREAT SERPL-MCNC: 1 MG/DL (ref 0.5–1.4)
CV ECHO LV RWT: 0.69 CM
DIFFERENTIAL METHOD BLD: ABNORMAL
DOP CALC AO PEAK VEL: 1.79 M/S
DOP CALC AO VTI: 35.09 CM
DOP CALC LVOT AREA: 3.3 CM2
DOP CALC LVOT DIAMETER: 2.05 CM
DOP CALC LVOT PEAK VEL: 1.13 M/S
DOP CALC LVOT STROKE VOLUME: 69.67 CM3
DOP CALCLVOT PEAK VEL VTI: 21.12 CM
E WAVE DECELERATION TIME: 167.93 MSEC
E/A RATIO: 5
E/E' RATIO: 15.5 M/S
ECHO LV POSTERIOR WALL: 1.16 CM (ref 0.6–1.1)
EOSINOPHIL # BLD AUTO: 0.4 K/UL (ref 0–0.5)
EOSINOPHIL NFR BLD: 3.2 % (ref 0–8)
ERYTHROCYTE [DISTWIDTH] IN BLOOD BY AUTOMATED COUNT: 18.5 % (ref 11.5–14.5)
EST. GFR  (NO RACE VARIABLE): 57 ML/MIN/1.73 M^2
FRACTIONAL SHORTENING: 35 % (ref 28–44)
GLUCOSE SERPL-MCNC: 78 MG/DL (ref 70–110)
GLUCOSE UR QL STRIP: NEGATIVE
HCT VFR BLD AUTO: 29.9 % (ref 37–48.5)
HGB BLD-MCNC: 9.3 G/DL (ref 12–16)
HGB UR QL STRIP: NEGATIVE
IMM GRANULOCYTES # BLD AUTO: 0.08 K/UL (ref 0–0.04)
IMM GRANULOCYTES NFR BLD AUTO: 0.7 % (ref 0–0.5)
INTERVENTRICULAR SEPTUM: 1.19 CM (ref 0.6–1.1)
KETONES UR QL STRIP: NEGATIVE
LA MAJOR: 4.73 CM
LA MINOR: 5.22 CM
LA WIDTH: 3.25 CM
LEFT ATRIUM SIZE: 3.42 CM
LEFT ATRIUM VOLUME INDEX MOD: 19.4 ML/M2
LEFT ATRIUM VOLUME INDEX: 25.6 ML/M2
LEFT ATRIUM VOLUME MOD: 35.47 CM3
LEFT ATRIUM VOLUME: 46.89 CM3
LEFT INTERNAL DIMENSION IN SYSTOLE: 2.2 CM (ref 2.1–4)
LEFT VENTRICLE DIASTOLIC VOLUME INDEX: 25.55 ML/M2
LEFT VENTRICLE DIASTOLIC VOLUME: 46.75 ML
LEFT VENTRICLE MASS INDEX: 68 G/M2
LEFT VENTRICLE SYSTOLIC VOLUME INDEX: 8.9 ML/M2
LEFT VENTRICLE SYSTOLIC VOLUME: 16.27 ML
LEFT VENTRICULAR INTERNAL DIMENSION IN DIASTOLE: 3.38 CM (ref 3.5–6)
LEFT VENTRICULAR MASS: 125 G
LEUKOCYTE ESTERASE UR QL STRIP: ABNORMAL
LV LATERAL E/E' RATIO: 12.92 M/S
LV SEPTAL E/E' RATIO: 19.38 M/S
LYMPHOCYTES # BLD AUTO: 1.9 K/UL (ref 1–4.8)
LYMPHOCYTES NFR BLD: 16.2 % (ref 18–48)
MAGNESIUM SERPL-MCNC: 1.7 MG/DL (ref 1.6–2.6)
MCH RBC QN AUTO: 29 PG (ref 27–31)
MCHC RBC AUTO-ENTMCNC: 31.1 G/DL (ref 32–36)
MCV RBC AUTO: 93 FL (ref 82–98)
MICROSCOPIC COMMENT: ABNORMAL
MONOCYTES # BLD AUTO: 1.2 K/UL (ref 0.3–1)
MONOCYTES NFR BLD: 9.8 % (ref 4–15)
MV PEAK A VEL: 0.31 M/S
MV PEAK E VEL: 1.55 M/S
MV STENOSIS PRESSURE HALF TIME: 48.7 MS
MV VALVE AREA P 1/2 METHOD: 4.52 CM2
NEUTROPHILS # BLD AUTO: 8.2 K/UL (ref 1.8–7.7)
NEUTROPHILS NFR BLD: 69.6 % (ref 38–73)
NITRITE UR QL STRIP: NEGATIVE
NRBC BLD-RTO: 0 /100 WBC
OHS QRS DURATION: 160 MS
OHS QRS DURATION: 162 MS
OHS QTC CALCULATION: 527 MS
OHS QTC CALCULATION: 530 MS
PH UR STRIP: 7 [PH] (ref 5–8)
PHOSPHATE SERPL-MCNC: 2.4 MG/DL (ref 2.7–4.5)
PISA TR MAX VEL: 2.79 M/S
PLATELET # BLD AUTO: 275 K/UL (ref 150–450)
PMV BLD AUTO: 10.1 FL (ref 9.2–12.9)
POTASSIUM SERPL-SCNC: 3.1 MMOL/L (ref 3.5–5.1)
PROT SERPL-MCNC: 5.4 G/DL (ref 6–8.4)
PROT UR QL STRIP: NEGATIVE
RA MAJOR: 4.27 CM
RA PRESSURE ESTIMATED: 3 MMHG
RA WIDTH: 2.87 CM
RBC # BLD AUTO: 3.21 M/UL (ref 4–5.4)
RBC #/AREA URNS AUTO: 2 /HPF (ref 0–4)
RIGHT VENTRICLE DIASTOLIC BASEL DIMENSION: 3.1 CM
RV TB RVSP: 6 MMHG
SINUS: 3.08 CM
SODIUM SERPL-SCNC: 143 MMOL/L (ref 136–145)
SP GR UR STRIP: 1.01 (ref 1–1.03)
SQUAMOUS #/AREA URNS AUTO: 5 /HPF
STJ: 2.23 CM
T4 FREE SERPL-MCNC: 0.88 NG/DL (ref 0.71–1.51)
TDI LATERAL: 0.12 M/S
TDI SEPTAL: 0.08 M/S
TDI: 0.1 M/S
TR MAX PG: 31 MMHG
TRICUSPID ANNULAR PLANE SYSTOLIC EXCURSION: 2.22 CM
TV REST PULMONARY ARTERY PRESSURE: 34 MMHG
URN SPEC COLLECT METH UR: ABNORMAL
WBC # BLD AUTO: 11.79 K/UL (ref 3.9–12.7)
WBC #/AREA URNS AUTO: 6 /HPF (ref 0–5)
Z-SCORE OF LEFT VENTRICULAR DIMENSION IN END DIASTOLE: -4.06
Z-SCORE OF LEFT VENTRICULAR DIMENSION IN END SYSTOLE: -2.79

## 2024-08-27 PROCEDURE — 84100 ASSAY OF PHOSPHORUS: CPT | Performed by: STUDENT IN AN ORGANIZED HEALTH CARE EDUCATION/TRAINING PROGRAM

## 2024-08-27 PROCEDURE — 36415 COLL VENOUS BLD VENIPUNCTURE: CPT | Performed by: STUDENT IN AN ORGANIZED HEALTH CARE EDUCATION/TRAINING PROGRAM

## 2024-08-27 PROCEDURE — 85025 COMPLETE CBC W/AUTO DIFF WBC: CPT | Performed by: STUDENT IN AN ORGANIZED HEALTH CARE EDUCATION/TRAINING PROGRAM

## 2024-08-27 PROCEDURE — 20600001 HC STEP DOWN PRIVATE ROOM

## 2024-08-27 PROCEDURE — 83735 ASSAY OF MAGNESIUM: CPT | Performed by: STUDENT IN AN ORGANIZED HEALTH CARE EDUCATION/TRAINING PROGRAM

## 2024-08-27 PROCEDURE — 99222 1ST HOSP IP/OBS MODERATE 55: CPT | Mod: ,,, | Performed by: INTERNAL MEDICINE

## 2024-08-27 PROCEDURE — 63600175 PHARM REV CODE 636 W HCPCS

## 2024-08-27 PROCEDURE — 25000003 PHARM REV CODE 250

## 2024-08-27 PROCEDURE — 63600175 PHARM REV CODE 636 W HCPCS: Performed by: STUDENT IN AN ORGANIZED HEALTH CARE EDUCATION/TRAINING PROGRAM

## 2024-08-27 PROCEDURE — 25000242 PHARM REV CODE 250 ALT 637 W/ HCPCS: Performed by: STUDENT IN AN ORGANIZED HEALTH CARE EDUCATION/TRAINING PROGRAM

## 2024-08-27 PROCEDURE — 25500020 PHARM REV CODE 255

## 2024-08-27 PROCEDURE — 80053 COMPREHEN METABOLIC PANEL: CPT | Performed by: STUDENT IN AN ORGANIZED HEALTH CARE EDUCATION/TRAINING PROGRAM

## 2024-08-27 RX ORDER — POTASSIUM CHLORIDE 7.45 MG/ML
10 INJECTION INTRAVENOUS
Status: DISCONTINUED | OUTPATIENT
Start: 2024-08-27 | End: 2024-08-27

## 2024-08-27 RX ORDER — POTASSIUM CHLORIDE 7.45 MG/ML
20 INJECTION INTRAVENOUS
Status: DISCONTINUED | OUTPATIENT
Start: 2024-08-27 | End: 2024-08-27

## 2024-08-27 RX ORDER — POTASSIUM CHLORIDE 7.45 MG/ML
10 INJECTION INTRAVENOUS
Status: CANCELLED | OUTPATIENT
Start: 2024-08-27 | End: 2024-08-27

## 2024-08-27 RX ORDER — ACETAMINOPHEN 325 MG/1
650 TABLET ORAL EVERY 8 HOURS PRN
Status: DISCONTINUED | OUTPATIENT
Start: 2024-08-27 | End: 2024-09-03 | Stop reason: HOSPADM

## 2024-08-27 RX ORDER — POTASSIUM CHLORIDE 750 MG/1
30 CAPSULE, EXTENDED RELEASE ORAL
Status: DISCONTINUED | OUTPATIENT
Start: 2024-08-27 | End: 2024-08-27

## 2024-08-27 RX ORDER — MAGNESIUM SULFATE HEPTAHYDRATE 40 MG/ML
2 INJECTION, SOLUTION INTRAVENOUS ONCE
Status: COMPLETED | OUTPATIENT
Start: 2024-08-27 | End: 2024-08-27

## 2024-08-27 RX ORDER — POLYETHYLENE GLYCOL 3350 17 G/17G
17 POWDER, FOR SOLUTION ORAL ONCE
Status: COMPLETED | OUTPATIENT
Start: 2024-08-27 | End: 2024-08-27

## 2024-08-27 RX ORDER — SODIUM CHLORIDE AND POTASSIUM CHLORIDE 150; 900 MG/100ML; MG/100ML
INJECTION, SOLUTION INTRAVENOUS CONTINUOUS
Status: DISPENSED | OUTPATIENT
Start: 2024-08-27 | End: 2024-08-27

## 2024-08-27 RX ADMIN — ACETAMINOPHEN 650 MG: 325 TABLET ORAL at 01:08

## 2024-08-27 RX ADMIN — HUMAN ALBUMIN MICROSPHERES AND PERFLUTREN 0.11 MG: 10; .22 INJECTION, SOLUTION INTRAVENOUS at 09:08

## 2024-08-27 RX ADMIN — POLYETHYLENE GLYCOL 3350 17 G: 17 POWDER, FOR SOLUTION ORAL at 09:08

## 2024-08-27 RX ADMIN — PANTOPRAZOLE SODIUM 80 MG: 40 INJECTION, POWDER, LYOPHILIZED, FOR SOLUTION INTRAVENOUS at 12:08

## 2024-08-27 RX ADMIN — PANTOPRAZOLE SODIUM 40 MG: 40 INJECTION, POWDER, FOR SOLUTION INTRAVENOUS at 09:08

## 2024-08-27 RX ADMIN — MAGNESIUM SULFATE HEPTAHYDRATE 2 G: 40 INJECTION, SOLUTION INTRAVENOUS at 09:08

## 2024-08-27 RX ADMIN — FLUTICASONE FUROATE AND VILANTEROL TRIFENATATE 1 PUFF: 100; 25 POWDER RESPIRATORY (INHALATION) at 09:08

## 2024-08-27 RX ADMIN — POTASSIUM PHOSPHATE, MONOBASIC AND POTASSIUM PHOSPHATE, DIBASIC 15 MMOL: 224; 236 INJECTION, SOLUTION, CONCENTRATE INTRAVENOUS at 11:08

## 2024-08-27 RX ADMIN — SODIUM CHLORIDE AND POTASSIUM CHLORIDE: .9; .15 SOLUTION INTRAVENOUS at 11:08

## 2024-08-27 NOTE — ASSESSMENT & PLAN NOTE
Patient's COPD is controlled currently.  Patient is currently off COPD Pathway. Continue scheduled inhalers and monitor respiratory status closely.     - On home 2L, no wheezing on my exam  - CXR on admission  - Breo with prn albuterol while inpatient

## 2024-08-27 NOTE — ASSESSMENT & PLAN NOTE
Patient with symptomatic anemia. Appears her baseline was 10-11 over the past couple of months and has down trended to 8.2. Per ED there was blood noted on ZAHEER. There is concern for oozing from prior sphincterotomy site done on 7/30 given that she is on Eliquis and down trending hgb. She received 1U PRBC in the ED.    - Start IV PPI bid for now  - GI consulted for evaluation  - NPO for now  - Holding A/C

## 2024-08-27 NOTE — PHARMACY MED REC
"  Admission Medication History     The home medication history was taken by Agnes Haskins.    You may go to "Admission" then "Reconcile Home Medications" tabs to review and/or act upon these items.     The home medication list has been updated by the Pharmacy department.   Please read ALL comments highlighted in yellow.   Please address this information as you see fit.    Feel free to contact us if you have any questions or require assistance.      Medications listed below were obtained from: Nursing home  Current Outpatient Medications on File Prior to Encounter   Medication Sig    acetaminophen (TYLENOL) 500 MG tablet Take 2 tablets (1,000 mg total) by mouth every 8 (eight) hours as needed for Pain.    albuterol (PROVENTIL HFA) 90 mcg/actuation inhaler Inhale 2 puffs into the lungs every 6 (six) hours as needed for Wheezing. Rescue    apixaban (ELIQUIS) 5 mg Tab Take 1 tablet (5 mg total) by mouth 2 (two) times daily.    aspirin (ECOTRIN) 81 MG EC tablet Take 81 mg by mouth once daily.    biotin 1 mg tablet Take 1,000 mcg by mouth once daily.    budesonide-glycopyr-formoterol (BREZTRI AEROSPHERE) 160-9-4.8 mcg/actuation HFAA Inhale 2 puffs into the lungs 2 (two) times daily.    busPIRone (BUSPAR) 5 MG Tab Take 5 mg by mouth daily as needed (anxiety).    fluticasone propionate (FLONASE) 50 mcg/actuation nasal spray 1 spray by Each Nostril route daily as needed for Rhinitis or Allergies.    furosemide (LASIX) 40 MG tablet Take 1 tablet (40 mg total) by mouth once daily.    meclizine (ANTIVERT) 25 mg tablet Take 25 mg by mouth 3 (three) times daily as needed for Dizziness.    midodrine (PROAMATINE) 5 MG Tab Take 10 mg by mouth 3 (three) times daily.    inhalation spacing device Use as directed for inhalation.    losartan (COZAAR) 50 MG tablet Take 1 tablet (50 mg total) by mouth once daily. (Patient not taking: Reported on 8/26/2024) Not listed on NH orders.    metoprolol succinate (TOPROL-XL) 100 MG 24 hr tablet Take " 1 tablet (100 mg total) by mouth once daily. (Patient not taking: Reported on 8/26/2024) Not listed on NH orders.    vitamin D (VITAMIN D3) 1000 units Tab Take 2 tablets (2,000 Units total) by mouth once daily. (Patient not taking: Reported on 8/26/2024) Not listed on NH orders.      Potential issues to be addressed PRIOR TO DISCHARGE  The listed medications were obtained from another facility ([facility]). The patient may not have been able to fill these prescriptions prior to this admission and may require new scripts upon discharge.           Agnes Haskins  EXT [id number]                .

## 2024-08-27 NOTE — ASSESSMENT & PLAN NOTE
Ms. Barajas is an 78 year old female with history as above who presents from her nursing home for syncopal episode while taking a shower at her nursing home. Per her daughter, patient has had periods of lightheadedness over the past 3 weeks, since her ERCP w/ sphincterotomy. States her blood pressure has been low in the SNF despite midodrine treatment. Patient states she had no pro-dromal or post syncopal symptoms. EKG w/ 1st degree AVB, unchanged from previous. She was not taking any BP meds as the nursing home was holding them. Differentials include orthostasis, cardiac related given history of aflutter s/p ablation, neurologic related, drug induced although less likely as nursing home was holding her blood pressure medications and she does not take buspar.     - Continue to hold BP medications  - Hold anticoagulation for now given concern for bleed  - s/p fluids and PRBC  - CT head ordered given continued dizziness, syncope, nystagmus  - repeat echo pending  - continue telemetry  - can consider tilt table testing w/ EP as outpatient if problem persists

## 2024-08-27 NOTE — HPI
"Ms. Barajas is a 78 year old female with a history of COPD chronically on 2L, CKD, HFpEF, remote hx of breast cx s/p chemo c/b polyneuropathy, paroxysmal Afib/flutter who presents from [facility] for new onset dizziness that started around 7am this morning. Patient states she was taking a shower with her nursing aid this morning when she acutely began feeling like she was about to pass out. She denies that the room was spinning but instead endorses lightheadedness. She states she may have passed out for 1-2 minutes as per her nursing aid and remembers being held up by the nursing home staff. She denies any chest pain, palpitations, dyspnea, or any other symptoms prior to becoming dizzy. She denies any post-syncopal symptoms as well. She currently states she feels fine but may become "dizzy" again if she were to stand up. She also denies any new symptoms including rashes, wounds, dysuria, hematuria, hematemesis, abdominal pain, fever, chills. She is unsure if she has melena as nursing home staff changes her diapers. She states this has never happened in the past.     She has had multiple admissions over the last couple of months includin/7- HF exacerbation, AIN, E.faecalis UTI.   - admission for cellulitis and pastruella bacteremia s/p 2 week treatment w/ unaysyn and doxycline.  - admission for gallstone pancreatitis w/ possible early cholecystitis s/p ERCP w/ sphincterotomy on .     I spoke with the patient's daughter over the phone Marina. She states the patient began having symptoms of dizziness around 3 weeks ago. She noticed it began after her ERCP w/ sphincterotomy. While in rehab the daughter states patient has been persistently orthostatic with low blood pressures and dizziness with standing. The SNF attempted to use increasing doses midodrine and meclizine however she continued to be dizzy and orthostatic.     In the ED patient was noted to be orthostatic with /62 HR 82 " when lying and  w/ BP 65/35 when standing. Lactic acid was 1.6, labs otherwise remarkable for downtrending Hgb 10->8. She was started on 1U PRBC and fluids and admitted for further work-up.

## 2024-08-27 NOTE — CARE UPDATE
I have reviewed the chart of Misa Barajas who is hospitalized for the following:    Active Hospital Problems    Diagnosis    *Syncope    Acute blood loss anemia     POA, symptomatic, s/p transfusion      Typical atrial flutter    Orthostatic hypotension    Diastolic CHF, chronic    COPD (chronic obstructive pulmonary disease)    Chronic respiratory failure with hypoxia     POA  Home O2- 2L      Paroxysmal atrial fibrillation    CKD (chronic kidney disease) stage 3, GFR 30-59 ml/min    ACP (advance care planning)        Savanah Garcia NP  Unit Based [facility]

## 2024-08-27 NOTE — ED NOTES
Assumed care of patient and have received report from nurse. Patient is alert x 4, patient will be receiving 1 unit of blood shortly due to positive bloody stool test. Patient's Hemoglobin is 8.2 and hematocrit is 26.6%. Patient has been educated on possible blood reaction and possible side effects. Blood consent at bedside. Patient is connected to cardiac monitoring, cycling blood pressure cuff and pulse ox.     Patient identifiers for Misa Barajas checked and correct.    LOC: The patient is awake, alert and aware of environment with an appropriate affect, the patient is oriented x 4 and speaking appropriately.    APPEARANCE: Patient resting comfortably and in no acute distress, patient is clean and well groomed, patient's clothing is properly fastened.    SKIN: The skin is warm and dry, color consistent with ethnicity. +Moisture dermatitis on buttocks area, scattered bruising on BLE     MUSCULOSKELETAL: Patient moving all extremities well, no obvious swelling or deformities noted.+Generalized body weakness     RESPIRATORY: Airway is open and patent, respirations are spontaneous and even, patient has a normal effort and rate. 2 L NC     CARDIAC: Patient has a normal rate and rhythm, no periphreal edema noted, capillary refill < 3 seconds. Normal +2 pedal pulses present.    ABDOMEN: Soft and non tender to palpation, no distention noted. Patient denies any nausea, vomiting, diarrhea, or constipation.     NEUROLOGIC: Eyes open spontaneously, PERRL, behavior appropriate to situation, follows commands, facial expression symmetrical, bilateral hand grasp equal and even, purposeful motor response noted, normal sensation in all extremities.     HEENT: No abnormalities noted. White sclera and pupils equal round and reactive to light. Denies headache, dizziness.     : Pt voids independently, denies dysuria, hematuria, frequency.

## 2024-08-27 NOTE — SUBJECTIVE & OBJECTIVE
Past Medical History:   Diagnosis Date    Acute biliary pancreatitis 7/27/2024    Acute hypoxemic respiratory failure 6/26/2021    Acute superficial venous thrombosis of lower extremity, bilateral 1/25/2022    Anemia 7/12/2024    Aortic atherosclerosis     Arthritis     knee joint pain    Asthma-COPD overlap syndrome 8/22/2022    Breast cancer 2002    left breast & lymph nodes-s/p sx with chemo    Bronchitis     with flu-2/2014    Cataracts, bilateral     Chronic anticoagulation 5/4/2022    Chronic diastolic heart failure 12/24/2019    Chronic kidney disease, stage 3     Class 1 obesity due to excess calories with serious comorbidity and body mass index (BMI) of 30.0 to 30.9 in adult 5/16/2024    History of chemotherapy     last treatment 12/2002 (had 8 treatments)    Hyperlipidemia 11/20/2016    Hypertension     Lymphedema of both lower extremities 1/25/2022    Nephrolithiasis 6/2/2014    Osteoporosis     Paroxysmal atrial fibrillation 6/7/2021    Renal calculi     hematuria    Renal osteodystrophy 1/14/2016    Venous stasis dermatitis of both lower extremities 1/25/2022    Vitamin D insufficiency        Past Surgical History:   Procedure Laterality Date    ABLATION, ATRIAL FLUTTER, TYPICAL N/A 6/17/2024    Procedure: Ablation, Atrial Flutter, Typical;  Surgeon: Taz Church MD;  Location: [facility] EP LAB;  Service: Cardiology;  Laterality: N/A;  AFL, RFA, LORENE, MAKAYLA (cx if SR), paul MB, 3 Prep    BREAST BIOPSY Left 2002    core bx, +    BREAST BIOPSY Right 2018    core    BREAST BIOPSY Right 2019    BREAST LUMPECTOMY Left 2002    CYSTOSCOPY  4/28/2022    Procedure: CYSTOSCOPY;  Surgeon: Vik Ware MD;  Location: [facility] OR [address] [place] [address];  Service: Urology;;    CYSTOSCOPY  5/30/2022    Procedure: CYSTOSCOPY;  Surgeon: Bonnie Knutson MD;  Location: [facility] OR [address] [place] [address];  Service: Urology;;    ECHOCARDIOGRAM,TRANSESOPHAGEAL N/A 6/17/2024    Procedure: Transesophageal echo (MAKAYLA) intra-procedure log  documentation;  Surgeon: Nestor Martinez MD;  Location: [facility] EP LAB;  Service: Cardiology;  Laterality: N/A;    ERCP N/A 7/30/2024    Procedure: ERCP (ENDOSCOPIC RETROGRADE CHOLANGIOPANCREATOGRAPHY);  Surgeon: Sierra Cotto MD;  Location: [facility] ENDO (2ND FLR);  Service: Endoscopy;  Laterality: N/A;    LASER LITHOTRIPSY  5/30/2022    Procedure: LITHOTRIPSY, USING LASER;  Surgeon: Bonnie Knutson MD;  [facility];  Service: Urology;;    LITHOTRIPSY      MASTECTOMY Left 06/2002    left-& lymph node dissection    REPLACEMENT OF STENT Right 5/30/2022    Procedure: REPLACEMENT, STENT;  Surgeon: Bonnie Knutson MD;  [facility];  Service: Urology;  Laterality: Right;    RETROGRADE PYELOGRAPHY Right 4/28/2022    Procedure: PYELOGRAM, RETROGRADE;  Surgeon: Vik Ware MD;  [facility];  Service: Urology;  Laterality: Right;    ROBOT-ASSISTED LAPAROSCOPIC PARTIAL NEPHRECTOMY USING DA JESÚS XI Right 3/11/2021    Procedure: XI ROBOTIC NEPHRECTOMY, PARTIAL;  Surgeon: Taz Ortega MD;  Location: [facility] OR 2ND FLR;  Service: Urology;  Laterality: Right;  4hr/ gen with regional  [facility] confirmation:  [id number] for 11:15am case NC    URETEROSCOPIC REMOVAL OF URETERIC CALCULUS Right 5/30/2022    Procedure: REMOVAL, CALCULUS, URETER, URETEROSCOPIC;  Surgeon: Bonnie Knutson MD;  [facility];  Service: Urology;  Laterality: Right;    ureteroscopy Bilateral     6.14    URETEROSCOPY Right 5/30/2022    Procedure: URETEROSCOPY;  Surgeon: Bonnie Knutson MD;  Location: [facility] OR [address] [place] [address];  Service: Urology;  Laterality: Right;       Review of patient's allergies indicates:   Allergen Reactions    Adhesive tape-silicones     Adhesive Rash     Pt states she removed a LATEX bandaid and the skin beneath was swollen and red. No other latex causes a reaction.       No current facility-administered medications on file prior to encounter.     Current  Outpatient Medications on File Prior to Encounter   Medication Sig    acetaminophen (TYLENOL) 500 MG tablet Take 2 tablets (1,000 mg total) by mouth every 8 (eight) hours as needed for Pain.    albuterol (PROVENTIL HFA) 90 mcg/actuation inhaler Inhale 2 puffs into the lungs every 6 (six) hours as needed for Wheezing. Rescue    apixaban (ELIQUIS) 5 mg Tab Take 1 tablet (5 mg total) by mouth 2 (two) times daily.    aspirin (ECOTRIN) 81 MG EC tablet Take 81 mg by mouth once daily.    biotin 1 mg tablet Take 1,000 mcg by mouth once daily.    budesonide-glycopyr-formoterol (BREZTRI AEROSPHERE) 160-9-4.8 mcg/actuation HFAA Inhale 2 puffs into the lungs 2 (two) times daily.    busPIRone (BUSPAR) 5 MG Tab Take 5 mg by mouth daily as needed (anxiety).    fluticasone propionate (FLONASE) 50 mcg/actuation nasal spray 1 spray by Each Nostril route daily as needed for Rhinitis or Allergies.    furosemide (LASIX) 40 MG tablet Take 1 tablet (40 mg total) by mouth once daily.    meclizine (ANTIVERT) 25 mg tablet Take 25 mg by mouth 3 (three) times daily as needed for Dizziness.    midodrine (PROAMATINE) 5 MG Tab Take 10 mg by mouth 3 (three) times daily.    inhalation spacing device Use as directed for inhalation.    losartan (COZAAR) 50 MG tablet Take 1 tablet (50 mg total) by mouth once daily. (Patient not taking: Reported on 8/26/2024)    metoprolol succinate (TOPROL-XL) 100 MG 24 hr tablet Take 1 tablet (100 mg total) by mouth once daily. (Patient not taking: Reported on 8/26/2024)    vitamin D (VITAMIN D3) 1000 units Tab Take 2 tablets (2,000 Units total) by mouth once daily. (Patient not taking: Reported on 8/26/2024)     Family History       Problem Relation (Age of Onset)    Arthritis Mother, Sister    Bone cancer Mother    Breast cancer Mother, Sister    Cancer Father    Crohn's disease Daughter    Fibroids Daughter    No Known Problems Brother, Daughter          Tobacco Use    Smoking status: Former     Current packs/day:  0.00     Average packs/day: 1 pack/day for 50.0 years (50.0 ttl pk-yrs)     Types: Cigarettes     Start date: 1960     Quit date: 5/20/2009     Years since quitting: 15.2    Smokeless tobacco: Former   Substance and Sexual Activity    Alcohol use: No    Drug use: No    Sexual activity: Not Currently     Partners: Male     Birth control/protection: Partner-Vasectomy     Review of Systems   Constitutional:  Negative for chills and fatigue.   HENT:  Negative for sore throat.    Respiratory:  Negative for shortness of breath.    Cardiovascular:  Negative for chest pain.   Gastrointestinal:  Negative for abdominal pain, diarrhea, nausea and vomiting.   Genitourinary:  Negative for dysuria and flank pain.   Musculoskeletal:  Negative for arthralgias and joint swelling.   Neurological:  Positive for dizziness and syncope. Negative for headaches.   Psychiatric/Behavioral:  Negative for agitation and confusion.      Objective:     Vital Signs (Most Recent):  Temp: 97.9 °F (36.6 °C) (08/26/24 2102)  Pulse: 80 (08/26/24 2117)  Resp: 20 (08/26/24 2117)  BP: (!) 125/58 (08/26/24 2117)  SpO2: 100 % (08/26/24 2117) Vital Signs (24h Range):  Temp:  [97.7 °F (36.5 °C)-98 °F (36.7 °C)] 97.9 °F (36.6 °C)  Pulse:  [] 80  Resp:  [16-22] 20  SpO2:  [96 %-100 %] 100 %  BP: ()/(35-62) 125/58     Weight: 77.1 kg (170 lb)  Body mass index is 29.18 kg/m².     Physical Exam  HENT:      Head: Normocephalic.      Nose: Nose normal.      Mouth/Throat:      Mouth: Mucous membranes are moist.   Eyes:      Pupils: Pupils are equal, round, and reactive to light.   Cardiovascular:      Rate and Rhythm: Normal rate.   Pulmonary:      Effort: Pulmonary effort is normal.      Breath sounds: No wheezing or rales.   Abdominal:      General: Abdomen is flat.   Musculoskeletal:      Cervical back: Normal range of motion.      Right lower leg: No edema.      Left lower leg: No edema.   Skin:     General: Skin is warm.      Capillary Refill:  Capillary refill takes less than 2 seconds.      Findings: Bruising present.      Comments: Multiple bruises in UE/LE   Neurological:      General: No focal deficit present.      Mental Status: She is alert. Mental status is at baseline.      Comments: Horizontal nystagmus present   Psychiatric:         Mood and Affect: Mood normal.              CRANIAL NERVES     CN III, IV, VI   Pupils are equal, round, and reactive to light.       Significant Labs: All pertinent labs within the past 24 hours have been reviewed.    Significant Imaging: I have reviewed all pertinent imaging results/findings within the past 24 hours.

## 2024-08-27 NOTE — ASSESSMENT & PLAN NOTE
Patient made DNR in the ED. I confirmed with patient and her daughter about code status. They have prior paperwork signed, not in our system.

## 2024-08-27 NOTE — ASSESSMENT & PLAN NOTE
Creatine stable for now. BMP reviewed- noted Estimated Creatinine Clearance: 41 mL/min (based on SCr of 1.1 mg/dL). according to latest data. Based on current GFR, CKD stage is stage 3 - GFR 30-59.  Monitor UOP and serial BMP and adjust therapy as needed. Renally dose meds. Avoid nephrotoxic medications and procedures.

## 2024-08-27 NOTE — ASSESSMENT & PLAN NOTE
Noted history, previously needed IV diuretics. On home lasix 40. No overt signs of fluid overload on exam.     - Hold furosemide  - Echo

## 2024-08-27 NOTE — HPI
Ms. Barajas is a 78 year old female with a history of COPD chronically on 2L, CKD, HFpEF, remote hx of breast cx s/p chemo c/b polyneuropathy, paroxysmal Afib/flutter who presented from [facility] following an episode of syncope on 8/26. She was recently admitted for gallstone pancreatitis with possible early cholecystitis now s/p ERCP with sphincterectomy on 7/30. Patient was discharged 8/12 to SNF and the patient's daughter notes she has been persistently orthostatic with low blood pressures and dizziness with standing. The SNF attempted to use increasing doses midodrine and meclizine however she continued to be dizzy and orthostatic.     Patient states that she has been feeling well overall except for persistent dizziness that occurs when transitioning from siting to standing. She says this has been ongoing since her most recent discharge. She states her last bowel movement was 8/25 and her bowel habits of every 1-2 days has not changed. She denies any dark black or visibly bloody stool. No hematuria, hemoptysis, nausea, vomiting, abdominal pain, or fevers. Patient states she has never had a colonoscopy. Denies any family history of colon cancer. Patient's last dose of Eliquis was in the morning 8/26, also takes daily aspirin. Denies NSAID use.     On arrival to the ED patient with hemoglobin 8.2, down from 10.6 prior to recent discharge, with positive orthostatics. Patient was transfused 1 unit pRBCs and hemoglobin improved to 9.3. ED noted blood on ZAHEER.

## 2024-08-27 NOTE — ASSESSMENT & PLAN NOTE
Patient with Paroxysmal (<7 days) atrial fibrillation which is controlled currently with Beta Blocker. Patient is currently in sinus rhythm.[id number] Score: 6. HASBLED Score: 4. Anticoagulation not indicated due to bleed .    - See Atrial flutter

## 2024-08-27 NOTE — PLAN OF CARE
[facility] - Cardiology Stepdown  Initial Discharge Assessment       Primary Care Provider: Celia Carlisle MD    Admission Diagnosis: Dizziness [R42]  Syncope [R55]  Chest pain [R07.9]  Hypotension [I95.9]    Admission Date: 8/26/2024  Expected Discharge Date: 8/29/2024    Transition of Care Barriers: None    Payor: [employer] MEDICARE ADVANTAGE / Plan: [employer] H[employer] MCARE ADV / Product Type: Medicare Advantage /     Extended Emergency Contact Information  Primary Emergency Contact: Marina Gruber  Address: [address] [place] [address] [place]  Mobile Phone: [phone]  Relation: Daughter  Secondary Emergency Contact: Esther Barajas   [place]  Mobile Phone: [phone]  Relation: Daughter    Discharge Plan A: Skilled Nursing Facility  Discharge Plan B: Home with family, Home Health      [facility] [employer] [facility]  [address]  [facility] [address]  Phone: [phone] Fax: [phone]      Initial Assessment (most recent)       Adult Discharge Assessment - 08/27/24 1625          Discharge Assessment    Assessment Type Discharge Planning Assessment     Confirmed/corrected address, phone number and insurance Yes     Confirmed Demographics Correct on Facesheet     Communicated NOHEMI with patient/caregiver Date not available/Unable to determine     Reason For Admission syncope     People in Home alone     Facility Arrived From: [facility]     Do you expect to return to your current living situation? Yes     Do you have help at home or someone to help you manage your care at home? No     Prior to hospitilization cognitive status: Alert/Oriented     Current cognitive status: Alert/Oriented     Walking or Climbing Stairs Difficulty yes     Walking or Climbing Stairs ambulation difficulty, requires equipment     Mobility Management Rollator     Dressing/Bathing Difficulty yes     Dressing/Bathing bathing difficulty, requires equipment      "Dressing/Bathing Management shower chair     Home Accessibility wheelchair accessible     Equipment Currently Used at Home rollator;shower chair;oxygen;grab bar;blood pressure machine   has a pulse ox    Readmission within 30 days? Yes     Patient currently being followed by outpatient case management? No     Do you currently have service(s) that help you manage your care at home? No     Do you take prescription medications? Yes     Do you have prescription coverage? Yes     Coverage [facility] Medicare     Do you have any problems affording any of your prescribed medications? No     Is the patient taking medications as prescribed? yes     Who is going to help you get home at discharge? family     How do you get to doctors appointments? family or friend will provide     Are you on dialysis? No     Do you take coumadin? No   on eliquis    Discharge Plan A Skilled Nursing Facility     Discharge Plan B Home with family;Home Health     DME Needed Upon Discharge  none     Discharge Plan discussed with: Patient     Transition of Care Barriers None                     Readmission Assessment (most recent)       Readmission Assessment - 08/27/24 1629          Readmission    Was this a planned readmission? No     Why were you hospitalized in the last 30 days? acute pancreatitis     Why were you readmitted? Alarmed about signs/symptoms     When you left the hospital how did you feel? "okay I guess"     When you left the hospital where did you go? SNF     Did patient/caregiver refused recommended DC plan? No     Tell me about what happened between when you left the hospital and the day you returned. SNF - dizziness- helped me back in bed by staff     When did you start not feeling well? 7AM     Did you try to manage your symptoms your self? Yes     What did you do? facility staff assisted me                      CM met with the patient at the bedside and discussed the discharge plan. Gave her the discharge booklet and placed contact " numbers on the white board in the room. Patient alert and sitting up in bed.  Patient verified her name , , PCP, Insurance and Pharmacy . Stated she lives alone in a single story house  and has threshold steps to point of entry . Stated she is not on coumadin nor is she a dialysis patient nor had home health . DME's include:a rollator , oxygen at 2 liters and gets supplies from [facility] , pulse oxometer, blood pressure cuff, grab bar and shower chair.  Stated she takes  medication as prescribed and has no problems getting  medication.  Stated her daughters can bring her home if she goes home but stated she was at [facility] for SNF and can sit up in a wheelchair with her oxygen.Stated she uses [facility] for her pharmacy.    Discharge Plan A and Plan B have been determined by review of patient's clinical status, future medical and therapeutic needs, and coverage/benefits for post-acute care in coordination with multidisciplinary team members.    Jimi Jain RN         [phone]

## 2024-08-27 NOTE — PROGRESS NOTES
Pt arrived to CSU, room 305, via stretcher. Pt on 2L oxygen via NC. Pt is on telemetry. Purewick changed. Sacrum mepilex foam changed and barrier cream applied. Pt oriented to room and how to use call light. VS taken and documented. Assessment completed. Bed is in lowest position, locked, call light in reach, and side rail up x2. Pt instructed to use call light to call for assistance when needed. Will continue plan of care.      08/27/24 0134   Vital Signs   Temp 97.5 °F (36.4 °C)   Temp Source Oral   Pulse 99   Heart Rate Source Monitor   Resp 18   SpO2 (!) 92 %   Flow (L/min) (Oxygen Therapy) 3   Device (Oxygen Therapy) nasal cannula   /62   MAP (mmHg) 89   BP Location Right arm   BP Method Automatic   Patient Position Lying     Nurses Note -- 4 Eyes      8/27/2024   01:25 AM      Skin assessed during: Transfer      [] No Altered Skin Integrity Present    []Prevention Measures Documented      [x] Yes- Altered Skin Integrity Present or Discovered   [] LDA Added if Not in Epic (Describe Wound)   [x] New Altered Skin Integrity was Present on Admit and Documented in LDA   [] Wound Image Taken    Wound Care Consulted? No    Attending Nurse:  JYOTI Garcia    Second RN/Staff Member:   GINGER Calderón

## 2024-08-27 NOTE — H&P
"[facility] - Emergency Dept  Hospital Medicine  History & Physical    Patient Name: Misa Barajas  MRN: [id number]  Patient Class: OP- Observation  Admission Date: 2024  Attending Physician: Lawrence Campos MD   Primary Care Provider: Celia Carlisle MD         Patient information was obtained from patient, relative(s), and ER records.     Subjective:     Principal Problem:Syncope    Chief Complaint:   Chief Complaint   Patient presents with    Dizziness     EMS from [facility]. Pt became dizzy this morning, when standing up, found to be orthostatic at therapy.         HPI: Ms. Barajas is a 78 year old female with a history of COPD chronically on 2L, CKD, HFpEF, remote hx of breast cx s/p chemo c/b polyneuropathy, paroxysmal Afib/flutter who presents from [facility] for new onset dizziness that started around 7am this morning. Patient states she was taking a shower with her nursing aid this morning when she acutely began feeling like she was about to pass out. She denies that the room was spinning but instead endorses lightheadedness. She states she may have passed out for 1-2 minutes as per her nursing aid and remembers being held up by the nursing home staff. She denies any chest pain, palpitations, dyspnea, or any other symptoms prior to becoming dizzy. She denies any post-syncopal symptoms as well. She currently states she feels fine but may become "dizzy" again if she were to stand up. She also denies any new symptoms including rashes, wounds, dysuria, hematuria, hematemesis, abdominal pain, fever, chills. She is unsure if she has melena as nursing home staff changes her diapers. She states this has never happened in the past.     She has had multiple admissions over the last couple of months includin/7- HF exacerbation, AIN, E.faecalis UTI.   - admission for cellulitis and pastruella bacteremia s/p 2 week treatment w/ unaysyn and doxycline.  - admission for gallstone " pancreatitis w/ possible early cholecystitis s/p ERCP w/ sphincterotomy on 7/30.     I spoke with the patient's daughter over the phone Marina. She states the patient began having symptoms of dizziness around 3 weeks ago. She noticed it began after her ERCP w/ sphincterotomy. While in rehab the daughter states patient has been persistently orthostatic with low blood pressures and dizziness with standing. The SNF attempted to use increasing doses midodrine and meclizine however she continued to be dizzy and orthostatic.     In the ED patient was noted to be orthostatic with /62 HR 82 when lying and  w/ BP 65/35 when standing. Lactic acid was 1.6, labs otherwise remarkable for downtrending Hgb 10->8. She was started on 1U PRBC and fluids and admitted for further work-up.              Past Medical History:   Diagnosis Date    Acute biliary pancreatitis 7/27/2024    Acute hypoxemic respiratory failure 6/26/2021    Acute superficial venous thrombosis of lower extremity, bilateral 1/25/2022    Anemia 7/12/2024    Aortic atherosclerosis     Arthritis     knee joint pain    Asthma-COPD overlap syndrome 8/22/2022    Breast cancer 2002    left breast & lymph nodes-s/p sx with chemo    Bronchitis     with flu-2/2014    Cataracts, bilateral     Chronic anticoagulation 5/4/2022    Chronic diastolic heart failure 12/24/2019    Chronic kidney disease, stage 3     Class 1 obesity due to excess calories with serious comorbidity and body mass index (BMI) of 30.0 to 30.9 in adult 5/16/2024    History of chemotherapy     last treatment 12/2002 (had 8 treatments)    Hyperlipidemia 11/20/2016    Hypertension     Lymphedema of both lower extremities 1/25/2022    Nephrolithiasis 6/2/2014    Osteoporosis     Paroxysmal atrial fibrillation 6/7/2021    Renal calculi     hematuria    Renal osteodystrophy 1/14/2016    Venous stasis dermatitis of both lower extremities 1/25/2022    Vitamin D insufficiency        Past Surgical History:    Procedure Laterality Date    ABLATION, ATRIAL FLUTTER, TYPICAL N/A 6/17/2024    Procedure: Ablation, Atrial Flutter, Typical;  Surgeon: Taz Church MD;  Location: [facility] EP LAB;  Service: Cardiology;  Laterality: N/A;  AFL, RFA, LORENE, MAKAYLA (cx if SR), anes, MB, 3 Prep    BREAST BIOPSY Left 2002    core bx, +    BREAST BIOPSY Right 2018    core    BREAST BIOPSY Right 2019    BREAST LUMPECTOMY Left 2002    CYSTOSCOPY  4/28/2022    Procedure: CYSTOSCOPY;  Surgeon: Vik Ware MD;  Location: [facility] OR 1ST FLR;  Service: Urology;;    CYSTOSCOPY  5/30/2022    Procedure: CYSTOSCOPY;  Surgeon: Bonnie Knutson MD;  Location: [facility] OR [facility] FLR;  Service: Urology;;    ECHOCARDIOGRAM,TRANSESOPHAGEAL N/A 6/17/2024    Procedure: Transesophageal echo (MAKAYLA) intra-procedure log documentation;  Surgeon: Nestor Martinez MD;  Location: [facility] EP LAB;  Service: Cardiology;  Laterality: N/A;    ERCP N/A 7/30/2024    Procedure: ERCP (ENDOSCOPIC RETROGRADE CHOLANGIOPANCREATOGRAPHY);  Surgeon: Sierra Cotto MD;  Location: [facility] ENDO (2ND FLR);  Service: Endoscopy;  Laterality: N/A;    LASER LITHOTRIPSY  5/30/2022    Procedure: LITHOTRIPSY, USING LASER;  Surgeon: Bonnie Knutson MD;  Location: [facility] OR [facility] FLR;  Service: Urology;;    LITHOTRIPSY      MASTECTOMY Left 06/2002    left-& lymph node dissection    REPLACEMENT OF STENT Right 5/30/2022    Procedure: REPLACEMENT, STENT;  Surgeon: Bonnie Knutson MD;  Location: [facility] OR 1ST FLR;  Service: Urology;  Laterality: Right;    RETROGRADE PYELOGRAPHY Right 4/28/2022    Procedure: PYELOGRAM, RETROGRADE;  Surgeon: Vik Ware MD;  Location: [facility] OR 1ST FLR;  Service: Urology;  Laterality: Right;    ROBOT-ASSISTED LAPAROSCOPIC PARTIAL NEPHRECTOMY USING DA JESÚS XI Right 3/11/2021    Procedure: XI ROBOTIC NEPHRECTOMY, PARTIAL;  Surgeon: Taz Ortega MD;  Location: [facility] OR 2ND FLR;  Service: Urology;  Laterality: Right;  4hr/ gen with regional  [place]  confirmation:  [id number] for 11:15am case NC    URETEROSCOPIC REMOVAL OF URETERIC CALCULUS Right 5/30/2022    Procedure: REMOVAL, CALCULUS, URETER, URETEROSCOPIC;  Surgeon: Bonnie Knutson MD;  Location: [facility] OR [address] [place] [address];  Service: Urology;  Laterality: Right;    ureteroscopy Bilateral     6.14    URETEROSCOPY Right 5/30/2022    Procedure: URETEROSCOPY;  Surgeon: Bonnie Knutson MD;  Location: [facility] OR [address] [place] [address];  Service: Urology;  Laterality: Right;       Review of patient's allergies indicates:   Allergen Reactions    Adhesive tape-silicones     Adhesive Rash     Pt states she removed a LATEX bandaid and the skin beneath was swollen and red. No other latex causes a reaction.       No current facility-administered medications on file prior to encounter.     Current Outpatient Medications on File Prior to Encounter   Medication Sig    acetaminophen (TYLENOL) 500 MG tablet Take 2 tablets (1,000 mg total) by mouth every 8 (eight) hours as needed for Pain.    albuterol (PROVENTIL HFA) 90 mcg/actuation inhaler Inhale 2 puffs into the lungs every 6 (six) hours as needed for Wheezing. Rescue    apixaban (ELIQUIS) 5 mg Tab Take 1 tablet (5 mg total) by mouth 2 (two) times daily.    aspirin (ECOTRIN) 81 MG EC tablet Take 81 mg by mouth once daily.    biotin 1 mg tablet Take 1,000 mcg by mouth once daily.    budesonide-glycopyr-formoterol (BREZTRI AEROSPHERE) 160-9-4.8 mcg/actuation HFAA Inhale 2 puffs into the lungs 2 (two) times daily.    busPIRone (BUSPAR) 5 MG Tab Take 5 mg by mouth daily as needed (anxiety).    fluticasone propionate (FLONASE) 50 mcg/actuation nasal spray 1 spray by Each Nostril route daily as needed for Rhinitis or Allergies.    furosemide (LASIX) 40 MG tablet Take 1 tablet (40 mg total) by mouth once daily.    meclizine (ANTIVERT) 25 mg tablet Take 25 mg by mouth 3 (three) times daily as needed for Dizziness.    midodrine (PROAMATINE) 5 MG Tab Take 10 mg by mouth 3 (three) times daily.     inhalation spacing device Use as directed for inhalation.    losartan (COZAAR) 50 MG tablet Take 1 tablet (50 mg total) by mouth once daily. (Patient not taking: Reported on 8/26/2024)    metoprolol succinate (TOPROL-XL) 100 MG 24 hr tablet Take 1 tablet (100 mg total) by mouth once daily. (Patient not taking: Reported on 8/26/2024)    vitamin D (VITAMIN D3) 1000 units Tab Take 2 tablets (2,000 Units total) by mouth once daily. (Patient not taking: Reported on 8/26/2024)     Family History       Problem Relation (Age of Onset)    Arthritis Mother, Sister    Bone cancer Mother    Breast cancer Mother, Sister    Cancer Father    Crohn's disease Daughter    Fibroids Daughter    No Known Problems Brother, Daughter          Tobacco Use    Smoking status: Former     Current packs/day: 0.00     Average packs/day: 1 pack/day for 50.0 years (50.0 ttl pk-yrs)     Types: Cigarettes     Start date: 1960     Quit date: 5/20/2009     Years since quitting: 15.2    Smokeless tobacco: Former   Substance and Sexual Activity    Alcohol use: No    Drug use: No    Sexual activity: Not Currently     Partners: Male     Birth control/protection: Partner-Vasectomy     Review of Systems   Constitutional:  Negative for chills and fatigue.   HENT:  Negative for sore throat.    Respiratory:  Negative for shortness of breath.    Cardiovascular:  Negative for chest pain.   Gastrointestinal:  Negative for abdominal pain, diarrhea, nausea and vomiting.   Genitourinary:  Negative for dysuria and flank pain.   Musculoskeletal:  Negative for arthralgias and joint swelling.   Neurological:  Positive for dizziness and syncope. Negative for headaches.   Psychiatric/Behavioral:  Negative for agitation and confusion.      Objective:     Vital Signs (Most Recent):  Temp: 97.9 °F (36.6 °C) (08/26/24 2102)  Pulse: 80 (08/26/24 2117)  Resp: 20 (08/26/24 2117)  BP: (!) 125/58 (08/26/24 2117)  SpO2: 100 % (08/26/24 2117) Vital Signs (24h Range):  Temp:  [97.7 °F  (36.5 °C)-98 °F (36.7 °C)] 97.9 °F (36.6 °C)  Pulse:  [] 80  Resp:  [16-22] 20  SpO2:  [96 %-100 %] 100 %  BP: ()/(35-62) 125/58     Weight: 77.1 kg (170 lb)  Body mass index is 29.18 kg/m².     Physical Exam  HENT:      Head: Normocephalic.      Nose: Nose normal.      Mouth/Throat:      Mouth: Mucous membranes are moist.   Eyes:      Pupils: Pupils are equal, round, and reactive to light.   Cardiovascular:      Rate and Rhythm: Normal rate.   Pulmonary:      Effort: Pulmonary effort is normal.      Breath sounds: No wheezing or rales.   Abdominal:      General: Abdomen is flat.   Musculoskeletal:      Cervical back: Normal range of motion.      Right lower leg: No edema.      Left lower leg: No edema.   Skin:     General: Skin is warm.      Capillary Refill: Capillary refill takes less than 2 seconds.      Findings: Bruising present.      Comments: Multiple bruises in UE/LE   Neurological:      General: No focal deficit present.      Mental Status: She is alert. Mental status is at baseline.      Comments: Horizontal nystagmus present   Psychiatric:         Mood and Affect: Mood normal.              CRANIAL NERVES     CN III, IV, VI   Pupils are equal, round, and reactive to light.       Significant Labs: All pertinent labs within the past 24 hours have been reviewed.    Significant Imaging: I have reviewed all pertinent imaging results/findings within the past 24 hours.  Assessment/Plan:     * Syncope  Ms. Barajas is an 78 year old female with history as above who presents from her nursing home for syncopal episode while taking a shower at her nursing home. Per her daughter, patient has had periods of lightheadedness over the past 3 weeks, since her ERCP w/ sphincterotomy. States her blood pressure has been low in the SNF despite midodrine treatment. Patient states she had no pro-dromal or post syncopal symptoms. EKG w/ 1st degree AVB, unchanged from previous. She was not taking any BP meds as the  nursing home was holding them. Differentials include orthostasis, cardiac related given history of aflutter s/p ablation, neurologic related, drug induced although less likely as nursing home was holding her blood pressure medications and she does not take buspar.     - Continue to hold BP medications  - Hold anticoagulation for now given concern for bleed  - s/p fluids and PRBC  - CT head ordered given continued dizziness, syncope, nystagmus  - repeat echo pending  - continue telemetry  - can consider tilt table testing w/ EP as outpatient if problem persists      Anemia  Patient with symptomatic anemia. Appears her baseline was 10-11 over the past couple of months and has down trended to 8.2. Per ED there was blood noted on ZAHEER. There is concern for oozing from prior sphincterotomy site done on 7/30 given that she is on Eliquis and down trending hgb. She received 1U PRBC in the ED.    - Start IV PPI bid for now  - GI consulted for evaluation  - NPO for now  - Holding A/C    Typical atrial flutter  S/p ablation with EP. Has follow-up with Dr. Church in September. Currently on metoprolol and Eliquis.   ENUXE0NYHi Score: 6  Has-bleed: 4    - EKG with 1st degree avb and rbbb seen on previous EKGs  - F/u w/ Dr. Church, EP, next month  - holding Eliquis for now in setting of possible GI bleed      Orthostatic hypotension  Persistent orthostasis as reported by daughter over the past 3 weeks. Unclear etiology, trial of holding BP meds and starting midodrine unsuccessful. Differentials include drug induced, hypovolemia (?lasix), bleeding, neurologic given prior polyneuropathy (less likely), hypothyroid (last TSH elevated to >4 in 2021), infection. S/p 1U PRBC    - f/u TSH  - Hold anticoagulation and BP meds, GI consulted to evaluate for post sphincterotomy bleed  - F/u basic infectious work-up    Diastolic CHF, chronic  Noted history, previously needed IV diuretics. On home lasix 40. No overt signs of fluid overload on  exam.     - Hold furosemide  - Echo     COPD (chronic obstructive pulmonary disease)  Patient's COPD is controlled currently.  Patient is currently off COPD Pathway. Continue scheduled inhalers and monitor respiratory status closely.     - On home 2L, no wheezing on my exam  - CXR on admission  - Breo with prn albuterol while inpatient    Paroxysmal atrial fibrillation  Patient with Paroxysmal (<7 days) atrial fibrillation which is controlled currently with Beta Blocker. Patient is currently in sinus rhythm.[id number] Score: 6. HASBLED Score: 4. Anticoagulation not indicated due to bleed .    - See Atrial flutter    CKD (chronic kidney disease) stage 3, GFR 30-59 ml/min  Creatine stable for now. BMP reviewed- noted Estimated Creatinine Clearance: 41 mL/min (based on SCr of 1.1 mg/dL). according to latest data. Based on current GFR, CKD stage is stage 3 - GFR 30-59.  Monitor UOP and serial BMP and adjust therapy as needed. Renally dose meds. Avoid nephrotoxic medications and procedures.        ACP (advance care planning)  Patient made DNR in the ED. I confirmed with patient and her daughter about code status. They have prior paperwork signed, not in our system.        VTE Risk Mitigation (From admission, onward)           Ordered     Reason for No Pharmacological VTE Prophylaxis  Once        Question:  Reasons:  Answer:  Active Bleeding    08/26/24 2051     IP VTE HIGH RISK PATIENT  Once         08/26/24 2051     Place sequential compression device  Until discontinued         08/26/24 2033                           On 08/26/2024, patient should be placed in hospital observation services under my care in collaboration with Dr. Campos.         Tamir Vogel DO  Department of Hospital Medicine  [facility] - Emergency Dept

## 2024-08-27 NOTE — SUBJECTIVE & OBJECTIVE
Past Medical History:   Diagnosis Date    Acute biliary pancreatitis 7/27/2024    Acute hypoxemic respiratory failure 6/26/2021    Acute superficial venous thrombosis of lower extremity, bilateral 1/25/2022    Anemia 7/12/2024    Aortic atherosclerosis     Arthritis     knee joint pain    Asthma-COPD overlap syndrome 8/22/2022    Breast cancer 2002    left breast & lymph nodes-s/p sx with chemo    Bronchitis     with flu-2/2014    Cataracts, bilateral     Chronic anticoagulation 5/4/2022    Chronic diastolic heart failure 12/24/2019    Chronic kidney disease, stage 3     Class 1 obesity due to excess calories with serious comorbidity and body mass index (BMI) of 30.0 to 30.9 in adult 5/16/2024    History of chemotherapy     last treatment 12/2002 (had 8 treatments)    Hyperlipidemia 11/20/2016    Hypertension     Lymphedema of both lower extremities 1/25/2022    Nephrolithiasis 6/2/2014    Osteoporosis     Paroxysmal atrial fibrillation 6/7/2021    Renal calculi     hematuria    Renal osteodystrophy 1/14/2016    Venous stasis dermatitis of both lower extremities 1/25/2022    Vitamin D insufficiency        Past Surgical History:   Procedure Laterality Date    ABLATION, ATRIAL FLUTTER, TYPICAL N/A 6/17/2024    Procedure: Ablation, Atrial Flutter, Typical;  Surgeon: Taz Church MD;  Location: [facility] EP LAB;  Service: Cardiology;  Laterality: N/A;  AFL, RFA, LORENE, MAKAYLA (cx if SR), paul MB, 3 Prep    BREAST BIOPSY Left 2002    core bx, +    BREAST BIOPSY Right 2018    core    BREAST BIOPSY Right 2019    BREAST LUMPECTOMY Left 2002    CYSTOSCOPY  4/28/2022    Procedure: CYSTOSCOPY;  Surgeon: Vik Ware MD;  Location: [facility] OR [address] [place] [address];  Service: Urology;;    CYSTOSCOPY  5/30/2022    Procedure: CYSTOSCOPY;  Surgeon: Bonnie Knutson MD;  Location: [facility] OR [address] [place] [address];  Service: Urology;;    ECHOCARDIOGRAM,TRANSESOPHAGEAL N/A 6/17/2024    Procedure: Transesophageal echo (MAKAYLA) intra-procedure log  documentation;  Surgeon: Nestor Martinez MD;  Location: [facility] EP LAB;  Service: Cardiology;  Laterality: N/A;    ERCP N/A 7/30/2024    Procedure: ERCP (ENDOSCOPIC RETROGRADE CHOLANGIOPANCREATOGRAPHY);  Surgeon: Sierra Cotto MD;  Location: [facility] ENDO (2ND FLR);  Service: Endoscopy;  Laterality: N/A;    LASER LITHOTRIPSY  5/30/2022    Procedure: LITHOTRIPSY, USING LASER;  Surgeon: Bonnie Knutson MD;  [facility];  Service: Urology;;    LITHOTRIPSY      MASTECTOMY Left 06/2002    left-& lymph node dissection    REPLACEMENT OF STENT Right 5/30/2022    Procedure: REPLACEMENT, STENT;  Surgeon: Bonnie Knutson MD;  [facility];  Service: Urology;  Laterality: Right;    RETROGRADE PYELOGRAPHY Right 4/28/2022    Procedure: PYELOGRAM, RETROGRADE;  Surgeon: Vik Ware MD;  [facility];  Service: Urology;  Laterality: Right;    ROBOT-ASSISTED LAPAROSCOPIC PARTIAL NEPHRECTOMY USING DA JESÚS XI Right 3/11/2021    Procedure: XI ROBOTIC NEPHRECTOMY, PARTIAL;  Surgeon: Taz Ortega MD;  Location: [facility] OR 2ND FLR;  Service: Urology;  Laterality: Right;  4hr/ gen with regional  Fort confirmation:  [id number] for 11:15am case NC    URETEROSCOPIC REMOVAL OF URETERIC CALCULUS Right 5/30/2022    Procedure: REMOVAL, CALCULUS, URETER, URETEROSCOPIC;  Surgeon: Bonnie Knutson MD;  [facility];  Service: Urology;  Laterality: Right;    ureteroscopy Bilateral     6.14    URETEROSCOPY Right 5/30/2022    Procedure: URETEROSCOPY;  Surgeon: Bonnie Knutson MD;  [facility];  Service: Urology;  Laterality: Right;       Review of patient's allergies indicates:   Allergen Reactions    Adhesive tape-silicones     Adhesive Rash     Pt states she removed a LATEX bandaid and the skin beneath was swollen and red. No other latex causes a reaction.     Family History       Problem Relation (Age of Onset)    Arthritis Mother, Sister    Bone  "cancer Mother    Breast cancer Mother, Sister    Cancer Father    Crohn's disease Daughter    Fibroids Daughter    No Known Problems Brother, Daughter          Tobacco Use    Smoking status: Former     Current packs/day: 0.00     Average packs/day: 1 pack/day for 50.0 years (50.0 ttl pk-yrs)     Types: Cigarettes     Start date: 1960     Quit date: 5/20/2009     Years since quitting: 15.2    Smokeless tobacco: Former   Substance and Sexual Activity    Alcohol use: No    Drug use: No    Sexual activity: Not Currently     Partners: Male     Birth control/protection: Partner-Vasectomy     Review of Systems   Constitutional:  Positive for chills ("Always cold"). Negative for appetite change, diaphoresis, fatigue and fever.   HENT:  Negative for nosebleeds.    Respiratory:  Negative for cough and shortness of breath.    Cardiovascular:  Negative for chest pain and leg swelling.   Gastrointestinal:  Negative for abdominal distention, abdominal pain, blood in stool (Denies noticing, states ED told her there was blood on ZAHEER), diarrhea, nausea and vomiting.   Genitourinary:  Negative for dysuria and hematuria.   Neurological:  Positive for dizziness and light-headedness. Negative for weakness and headaches.   Hematological:  Bruises/bleeds easily.   Psychiatric/Behavioral:  Negative for agitation, behavioral problems and confusion.      Objective:     Vital Signs (Most Recent):  Temp: 98 °F (36.7 °C) (08/27/24 0810)  Pulse: 97 (08/27/24 0810)  Resp: 20 (08/27/24 0810)  BP: 133/63 (08/27/24 0810)  SpO2: 95 % (08/27/24 0810) Vital Signs (24h Range):  Temp:  [97.5 °F (36.4 °C)-99.1 °F (37.3 °C)] 98 °F (36.7 °C)  Pulse:  [] 97  Resp:  [16-22] 20  SpO2:  [92 %-100 %] 95 %  BP: ()/(35-63) 133/63     Weight: 77.1 kg (170 lb) (08/27/24 0600)  Body mass index is 29.18 kg/m².      Intake/Output Summary (Last 24 hours) at 8/27/2024 0835  Last data filed at 8/27/2024 0524  Gross per 24 hour   Intake 1233.33 ml   Output 450 " ml   Net 783.33 ml       Lines/Drains/Airways       Drain  Duration             Female External Urinary Catheter w/ Suction 08/26/24 1808 <1 day              Peripheral Intravenous Line  Duration                  Peripheral IV - Single Lumen 08/26/24 1332 20 G Right Antecubital <1 day                     Physical Exam  Vitals reviewed.   Constitutional:       General: She is not in acute distress.     Appearance: She is not ill-appearing.   HENT:      Head: Normocephalic and atraumatic.      Nose: Nose normal.      Mouth/Throat:      Mouth: Mucous membranes are moist.   Eyes:      General: No scleral icterus.     Conjunctiva/sclera: Conjunctivae normal.   Cardiovascular:      Rate and Rhythm: Normal rate.      Heart sounds: No murmur heard.  Pulmonary:      Effort: Pulmonary effort is normal. No respiratory distress.      Breath sounds: Normal breath sounds.      Comments: NC in place, on home 2L   Abdominal:      General: Abdomen is flat. Bowel sounds are normal. There is no distension.      Palpations: Abdomen is soft.      Tenderness: There is no abdominal tenderness.      Comments: ZAHEER with brown stool. No blood.    Musculoskeletal:      Right lower leg: No edema.      Left lower leg: No edema.   Skin:     General: Skin is warm.      Findings: Bruising present.   Neurological:      General: No focal deficit present.      Mental Status: She is alert and oriented to person, place, and time.   Psychiatric:         Mood and Affect: Mood normal.         Behavior: Behavior normal.         Thought Content: Thought content normal.          Significant Labs:  Blood Culture:   Recent Labs   Lab 08/26/24  2237 08/26/24  2238   LABBLOO No Growth to date No Growth to date     CBC:   Recent Labs   Lab 08/26/24  1336 08/27/24  0323   WBC 10.51 11.79   HGB 8.2* 9.3*   HCT 26.6* 29.9*    275     CMP:   Recent Labs   Lab 08/27/24  0323   GLU 78   CALCIUM 9.8   ALBUMIN 2.2*   PROT 5.4*      K 3.1*   CO2 28       BUN 25*   CREATININE 1.0   ALKPHOS 119   ALT 22   AST 34   BILITOT 1.9*     All pertinent lab results from the last 24 hours have been reviewed.    Significant Imaging:  Imaging results within the past 24 hours have been reviewed.

## 2024-08-27 NOTE — ASSESSMENT & PLAN NOTE
S/p ablation with EP. Has follow-up with Dr. Church in September. Currently on metoprolol and Eliquis.   [id number] Score: 6  Has-bleed: 4    - EKG with 1st degree avb and rbbb seen on previous EKGs  - F/u w/ Dr. Church, EP, next month  - holding Eliquis for now in setting of possible GI bleed

## 2024-08-27 NOTE — CONSULTS
[facility] - [facility]iology Stepdown  Gastroenterology  Consult Note    Patient Name: Misa Barajas  MRN: [id number]  Admission Date: 8/26/2024  Hospital Length of Stay: 0 days  Code Status: DNR   Attending Provider: Lawrence Campos MD   Consulting Provider: Uche Chew MD  Primary Care Physician: Celia Carlisle MD  Principal Problem:Syncope    Inpatient consult to Gastroenterology  Consult performed by: Uche Chew MD  Consult ordered by: Tamir Vogel DO  Assessment/Recommendations: Will follow up in clinic, no plan for inpatient EGD at this time         Subjective:     HPI:  Ms. Barajas is a 78 year old female with a history of COPD chronically on 2L, CKD, HFpEF, remote hx of breast cx s/p chemo c/b polyneuropathy, paroxysmal Afib/flutter who presented from [facility] following an episode of syncope on 8/26. She was recently admitted for gallstone pancreatitis with possible early cholecystitis now s/p ERCP with sphincterectomy on 7/30. Patient was discharged 8/12 to SNF and the patient's daughter notes she has been persistently orthostatic with low blood pressures and dizziness with standing. The SNF attempted to use increasing doses midodrine and meclizine however she continued to be dizzy and orthostatic.     Patient states that she has been feeling well overall except for persistent dizziness that occurs when transitioning from siting to standing. She says this has been ongoing since her most recent discharge. She states her last bowel movement was 8/25 and her bowel habits of every 1-2 days has not changed. She denies any dark black or visibly bloody stool. No hematuria, hemoptysis, nausea, vomiting, abdominal pain, or fevers. Patient states she has never had a colonoscopy. Denies any family history of colon cancer. Patient's last dose of Eliquis was in the morning 8/26, also takes daily aspirin. Denies NSAID use.     On arrival to the ED patient with hemoglobin 8.2, down from 10.6 prior to recent  discharge, with positive orthostatics. Patient was transfused 1 unit pRBCs and hemoglobin improved to 9.3. ED noted blood on ZAHEER.         Past Medical History:   Diagnosis Date    Acute biliary pancreatitis 7/27/2024    Acute hypoxemic respiratory failure 6/26/2021    Acute superficial venous thrombosis of lower extremity, bilateral 1/25/2022    Anemia 7/12/2024    Aortic atherosclerosis     Arthritis     knee joint pain    Asthma-COPD overlap syndrome 8/22/2022    Breast cancer 2002    left breast & lymph nodes-s/p sx with chemo    Bronchitis     with flu-2/2014    Cataracts, bilateral     Chronic anticoagulation 5/4/2022    Chronic diastolic heart failure 12/24/2019    Chronic kidney disease, stage 3     Class 1 obesity due to excess calories with serious comorbidity and body mass index (BMI) of 30.0 to 30.9 in adult 5/16/2024    History of chemotherapy     last treatment 12/2002 (had 8 treatments)    Hyperlipidemia 11/20/2016    Hypertension     Lymphedema of both lower extremities 1/25/2022    Nephrolithiasis 6/2/2014    Osteoporosis     Paroxysmal atrial fibrillation 6/7/2021    Renal calculi     hematuria    Renal osteodystrophy 1/14/2016    Venous stasis dermatitis of both lower extremities 1/25/2022    Vitamin D insufficiency        Past Surgical History:   Procedure Laterality Date    ABLATION, ATRIAL FLUTTER, TYPICAL N/A 6/17/2024    Procedure: Ablation, Atrial Flutter, Typical;  Surgeon: Taz Church MD;  Location: [facility] EP LAB;  Service: Cardiology;  Laterality: N/A;  AFL, RFA, LORENE, MAKAYLA (cx if SR), anes, MB, 3 Prep    BREAST BIOPSY Left 2002    core bx, +    BREAST BIOPSY Right 2018    core    BREAST BIOPSY Right 2019    BREAST LUMPECTOMY Left 2002    CYSTOSCOPY  4/28/2022    Procedure: CYSTOSCOPY;  Surgeon: Vik Ware MD;  [facility];  Service: Urology;;    CYSTOSCOPY  5/30/2022    Procedure: CYSTOSCOPY;  Surgeon: Bonnie Knutson MD;  Location: [facility] OR [address] [place] [address];   Service: Urology;;    ECHOCARDIOGRAM,TRANSESOPHAGEAL N/A 6/17/2024    Procedure: Transesophageal echo (MAKAYLA) intra-procedure log documentation;  Surgeon: Nestor Martinez MD;  Location: [facility] EP LAB;  Service: Cardiology;  Laterality: N/A;    ERCP N/A 7/30/2024    Procedure: ERCP (ENDOSCOPIC RETROGRADE CHOLANGIOPANCREATOGRAPHY);  Surgeon: Sierra Cotto MD;  Location: [facility] ENDO (2ND FLR);  Service: Endoscopy;  Laterality: N/A;    LASER LITHOTRIPSY  5/30/2022    Procedure: LITHOTRIPSY, USING LASER;  Surgeon: Bonnie Knutson MD;  Location: [facility] OR 1ST FLR;  Service: Urology;;    LITHOTRIPSY      MASTECTOMY Left 06/2002    left-& lymph node dissection    REPLACEMENT OF STENT Right 5/30/2022    Procedure: REPLACEMENT, STENT;  Surgeon: Bonnie Knutson MD;  [facility];  Service: Urology;  Laterality: Right;    RETROGRADE PYELOGRAPHY Right 4/28/2022    Procedure: PYELOGRAM, RETROGRADE;  Surgeon: Vik Ware MD;  [facility];  Service: Urology;  Laterality: Right;    ROBOT-ASSISTED LAPAROSCOPIC PARTIAL NEPHRECTOMY USING DA JESÚS XI Right 3/11/2021    Procedure: XI ROBOTIC NEPHRECTOMY, PARTIAL;  Surgeon: Taz Ortega MD;  Location: [facility] OR 2ND FLR;  Service: Urology;  Laterality: Right;  4hr/ gen with regional  Fort confirmation:  [id number] for 11:15am case NC    URETEROSCOPIC REMOVAL OF URETERIC CALCULUS Right 5/30/2022    Procedure: REMOVAL, CALCULUS, URETER, URETEROSCOPIC;  Surgeon: Bonnie Knutson MD;  [facility];  Service: Urology;  Laterality: Right;    ureteroscopy Bilateral     6.14    URETEROSCOPY Right 5/30/2022    Procedure: URETEROSCOPY;  Surgeon: Bonnie Knutson MD;  [facility];  Service: Urology;  Laterality: Right;       Review of patient's allergies indicates:   Allergen Reactions    Adhesive tape-silicones     Adhesive Rash     Pt states she removed a LATEX bandaid and the skin beneath was swollen and red.  "No other latex causes a reaction.     Family History       Problem Relation (Age of Onset)    Arthritis Mother, Sister    Bone cancer Mother    Breast cancer Mother, Sister    Cancer Father    Crohn's disease Daughter    Fibroids Daughter    No Known Problems Brother, Daughter          Tobacco Use    Smoking status: Former     Current packs/day: 0.00     Average packs/day: 1 pack/day for 50.0 years (50.0 ttl pk-yrs)     Types: Cigarettes     Start date: 1960     Quit date: 5/20/2009     Years since quitting: 15.2    Smokeless tobacco: Former   Substance and Sexual Activity    Alcohol use: No    Drug use: No    Sexual activity: Not Currently     Partners: Male     Birth control/protection: Partner-Vasectomy     Review of Systems   Constitutional:  Positive for chills ("Always cold"). Negative for appetite change, diaphoresis, fatigue and fever.   HENT:  Negative for nosebleeds.    Respiratory:  Negative for cough and shortness of breath.    Cardiovascular:  Negative for chest pain and leg swelling.   Gastrointestinal:  Negative for abdominal distention, abdominal pain, blood in stool (Denies noticing, states ED told her there was blood on ZAHEER), diarrhea, nausea and vomiting.   Genitourinary:  Negative for dysuria and hematuria.   Neurological:  Positive for dizziness and light-headedness. Negative for weakness and headaches.   Hematological:  Bruises/bleeds easily.   Psychiatric/Behavioral:  Negative for agitation, behavioral problems and confusion.      Objective:     Vital Signs (Most Recent):  Temp: 98 °F (36.7 °C) (08/27/24 0810)  Pulse: 97 (08/27/24 0810)  Resp: 20 (08/27/24 0810)  BP: 133/63 (08/27/24 0810)  SpO2: 95 % (08/27/24 0810) Vital Signs (24h Range):  Temp:  [97.5 °F (36.4 °C)-99.1 °F (37.3 °C)] 98 °F (36.7 °C)  Pulse:  [] 97  Resp:  [16-22] 20  SpO2:  [92 %-100 %] 95 %  BP: ()/(35-63) 133/63     Weight: 77.1 kg (170 lb) (08/27/24 0600)  Body mass index is 29.18 kg/m².      Intake/Output " Summary (Last 24 hours) at 8/27/2024 0835  Last data filed at 8/27/2024 0524  Gross per 24 hour   Intake 1233.33 ml   Output 450 ml   Net 783.33 ml       Lines/Drains/Airways       Drain  Duration             Female External Urinary Catheter w/ Suction 08/26/24 1808 <1 day              Peripheral Intravenous Line  Duration                  Peripheral IV - Single Lumen 08/26/24 1332 20 G Right Antecubital <1 day                     Physical Exam  Vitals reviewed.   Constitutional:       General: She is not in acute distress.     Appearance: She is not ill-appearing.   HENT:      Head: Normocephalic and atraumatic.      Nose: Nose normal.      Mouth/Throat:      Mouth: Mucous membranes are moist.   Eyes:      General: No scleral icterus.     Conjunctiva/sclera: Conjunctivae normal.   Cardiovascular:      Rate and Rhythm: Normal rate.      Heart sounds: No murmur heard.  Pulmonary:      Effort: Pulmonary effort is normal. No respiratory distress.      Breath sounds: Normal breath sounds.      Comments: NC in place, on home 2L   Abdominal:      General: Abdomen is flat. Bowel sounds are normal. There is no distension.      Palpations: Abdomen is soft.      Tenderness: There is no abdominal tenderness.      Comments: ZAHEER with brown stool. No blood.    Musculoskeletal:      Right lower leg: No edema.      Left lower leg: No edema.   Skin:     General: Skin is warm.      Findings: Bruising present.   Neurological:      General: No focal deficit present.      Mental Status: She is alert and oriented to person, place, and time.   Psychiatric:         Mood and Affect: Mood normal.         Behavior: Behavior normal.         Thought Content: Thought content normal.          Significant Labs:  Blood Culture:   Recent Labs   Lab 08/26/24  2237 08/26/24  2238   LABBLOO No Growth to date No Growth to date     CBC:   Recent Labs   Lab 08/26/24  1336 08/27/24  0323   WBC 10.51 11.79   HGB 8.2* 9.3*   HCT 26.6* 29.9*    275      CMP:   Recent Labs   Lab 08/27/24  0323   GLU 78   CALCIUM 9.8   ALBUMIN 2.2*   PROT 5.4*      K 3.1*   CO2 28      BUN 25*   CREATININE 1.0   ALKPHOS 119   ALT 22   AST 34   BILITOT 1.9*     All pertinent lab results from the last 24 hours have been reviewed.    Significant Imaging:  Imaging results within the past 24 hours have been reviewed.  Assessment/Plan:     Oncology  Anemia  Patient with no evidence of blood on rectal exam, brown formed stool noted. Given recent sphincterotomy and no signs of acute blood loss, no indication for inpatient EGD at this time.     - Plan to follow up in clinic, will consider EGD and colonoscopy as outpatient   - Okay to resume Eliquis at this time  - Recommend continuing pantoprazole   - Please notify GI team if there is significant change in patient's clinical status         Thank you for your consult. I will sign off. Please contact us if you have any additional questions.    Uche Chew MD  Gastroenterology  [facility] - Cardiology Stepdown

## 2024-08-27 NOTE — ASSESSMENT & PLAN NOTE
Patient with no evidence of blood on rectal exam, brown formed stool noted. Given recent sphincterotomy and no signs of acute blood loss, no indication for inpatient EGD at this time.     - Plan to follow up in clinic, will consider EGD and colonoscopy as outpatient   - Okay to resume Eliquis at this time  - Recommend continuing pantoprazole   - Please notify GI team if there is significant change in patient's clinical status

## 2024-08-27 NOTE — ASSESSMENT & PLAN NOTE
Persistent orthostasis as reported by daughter over the past 3 weeks. Unclear etiology, trial of holding BP meds and starting midodrine unsuccessful. Differentials include drug induced, hypovolemia (?lasix), bleeding, neurologic given prior polyneuropathy (less likely), hypothyroid (last TSH elevated to >4 in 2021), infection. S/p 1U PRBC    - f/u TSH  - Hold anticoagulation and BP meds, GI consulted to evaluate for post sphincterotomy bleed  - F/u basic infectious work-up

## 2024-08-28 LAB
ALBUMIN SERPL BCP-MCNC: 2.2 G/DL (ref 3.5–5.2)
ALP SERPL-CCNC: 121 U/L (ref 55–135)
ALT SERPL W/O P-5'-P-CCNC: 23 U/L (ref 10–44)
ANION GAP SERPL CALC-SCNC: 8 MMOL/L (ref 8–16)
AST SERPL-CCNC: 37 U/L (ref 10–40)
BASOPHILS # BLD AUTO: 0.08 K/UL (ref 0–0.2)
BASOPHILS NFR BLD: 0.9 % (ref 0–1.9)
BILIRUB SERPL-MCNC: 1 MG/DL (ref 0.1–1)
BUN SERPL-MCNC: 20 MG/DL (ref 8–23)
CALCIUM SERPL-MCNC: 9.8 MG/DL (ref 8.7–10.5)
CHLORIDE SERPL-SCNC: 109 MMOL/L (ref 95–110)
CO2 SERPL-SCNC: 26 MMOL/L (ref 23–29)
CORTIS SERPL-MCNC: 9.9 UG/DL (ref 4.3–22.4)
CREAT SERPL-MCNC: 1.1 MG/DL (ref 0.5–1.4)
DIFFERENTIAL METHOD BLD: ABNORMAL
EOSINOPHIL # BLD AUTO: 0.5 K/UL (ref 0–0.5)
EOSINOPHIL NFR BLD: 6.2 % (ref 0–8)
ERYTHROCYTE [DISTWIDTH] IN BLOOD BY AUTOMATED COUNT: 18.7 % (ref 11.5–14.5)
EST. GFR  (NO RACE VARIABLE): 50.8 ML/MIN/1.73 M^2
GLUCOSE SERPL-MCNC: 84 MG/DL (ref 70–110)
HCT VFR BLD AUTO: 30.3 % (ref 37–48.5)
HGB BLD-MCNC: 9.8 G/DL (ref 12–16)
IMM GRANULOCYTES # BLD AUTO: 0.03 K/UL (ref 0–0.04)
IMM GRANULOCYTES NFR BLD AUTO: 0.4 % (ref 0–0.5)
LYMPHOCYTES # BLD AUTO: 1.7 K/UL (ref 1–4.8)
LYMPHOCYTES NFR BLD: 19.5 % (ref 18–48)
MAGNESIUM SERPL-MCNC: 2.2 MG/DL (ref 1.6–2.6)
MCH RBC QN AUTO: 29.8 PG (ref 27–31)
MCHC RBC AUTO-ENTMCNC: 32.3 G/DL (ref 32–36)
MCV RBC AUTO: 92 FL (ref 82–98)
MONOCYTES # BLD AUTO: 1.1 K/UL (ref 0.3–1)
MONOCYTES NFR BLD: 12.3 % (ref 4–15)
NEUTROPHILS # BLD AUTO: 5.2 K/UL (ref 1.8–7.7)
NEUTROPHILS NFR BLD: 60.7 % (ref 38–73)
NRBC BLD-RTO: 0 /100 WBC
PHOSPHATE SERPL-MCNC: 2.6 MG/DL (ref 2.7–4.5)
PLATELET # BLD AUTO: 275 K/UL (ref 150–450)
PMV BLD AUTO: 9.8 FL (ref 9.2–12.9)
POTASSIUM SERPL-SCNC: 3.3 MMOL/L (ref 3.5–5.1)
PROT SERPL-MCNC: 5.5 G/DL (ref 6–8.4)
RBC # BLD AUTO: 3.29 M/UL (ref 4–5.4)
SODIUM SERPL-SCNC: 143 MMOL/L (ref 136–145)
WBC # BLD AUTO: 8.52 K/UL (ref 3.9–12.7)

## 2024-08-28 PROCEDURE — 85025 COMPLETE CBC W/AUTO DIFF WBC: CPT | Performed by: STUDENT IN AN ORGANIZED HEALTH CARE EDUCATION/TRAINING PROGRAM

## 2024-08-28 PROCEDURE — 25000003 PHARM REV CODE 250: Performed by: INTERNAL MEDICINE

## 2024-08-28 PROCEDURE — 97165 OT EVAL LOW COMPLEX 30 MIN: CPT

## 2024-08-28 PROCEDURE — 94761 N-INVAS EAR/PLS OXIMETRY MLT: CPT

## 2024-08-28 PROCEDURE — 25000003 PHARM REV CODE 250

## 2024-08-28 PROCEDURE — 36415 COLL VENOUS BLD VENIPUNCTURE: CPT | Performed by: INTERNAL MEDICINE

## 2024-08-28 PROCEDURE — 83735 ASSAY OF MAGNESIUM: CPT | Performed by: STUDENT IN AN ORGANIZED HEALTH CARE EDUCATION/TRAINING PROGRAM

## 2024-08-28 PROCEDURE — 20600001 HC STEP DOWN PRIVATE ROOM

## 2024-08-28 PROCEDURE — 84100 ASSAY OF PHOSPHORUS: CPT | Performed by: STUDENT IN AN ORGANIZED HEALTH CARE EDUCATION/TRAINING PROGRAM

## 2024-08-28 PROCEDURE — 97535 SELF CARE MNGMENT TRAINING: CPT

## 2024-08-28 PROCEDURE — 80053 COMPREHEN METABOLIC PANEL: CPT | Performed by: STUDENT IN AN ORGANIZED HEALTH CARE EDUCATION/TRAINING PROGRAM

## 2024-08-28 PROCEDURE — 82533 TOTAL CORTISOL: CPT | Performed by: INTERNAL MEDICINE

## 2024-08-28 PROCEDURE — 36415 COLL VENOUS BLD VENIPUNCTURE: CPT | Performed by: STUDENT IN AN ORGANIZED HEALTH CARE EDUCATION/TRAINING PROGRAM

## 2024-08-28 PROCEDURE — 99900035 HC TECH TIME PER 15 MIN (STAT)

## 2024-08-28 PROCEDURE — 27000221 HC OXYGEN, UP TO 24 HOURS

## 2024-08-28 PROCEDURE — 97530 THERAPEUTIC ACTIVITIES: CPT

## 2024-08-28 RX ORDER — POTASSIUM CHLORIDE 20 MEQ/1
40 TABLET, EXTENDED RELEASE ORAL ONCE
Status: CANCELLED | OUTPATIENT
Start: 2024-08-28 | End: 2024-08-28

## 2024-08-28 RX ORDER — PANTOPRAZOLE SODIUM 40 MG/1
40 TABLET, DELAYED RELEASE ORAL DAILY
Status: DISCONTINUED | OUTPATIENT
Start: 2024-08-28 | End: 2024-08-28

## 2024-08-28 RX ORDER — PANTOPRAZOLE SODIUM 40 MG/1
40 TABLET, DELAYED RELEASE ORAL
Status: DISCONTINUED | OUTPATIENT
Start: 2024-08-28 | End: 2024-09-03 | Stop reason: HOSPADM

## 2024-08-28 RX ORDER — PANTOPRAZOLE SODIUM 40 MG/1
40 TABLET, DELAYED RELEASE ORAL 2 TIMES DAILY
Status: DISCONTINUED | OUTPATIENT
Start: 2024-08-28 | End: 2024-08-28

## 2024-08-28 RX ORDER — SODIUM CHLORIDE 9 MG/ML
INJECTION, SOLUTION INTRAVENOUS CONTINUOUS
Status: ACTIVE | OUTPATIENT
Start: 2024-08-28 | End: 2024-08-28

## 2024-08-28 RX ORDER — POLYETHYLENE GLYCOL 3350 17 G/17G
17 POWDER, FOR SOLUTION ORAL DAILY
Status: DISCONTINUED | OUTPATIENT
Start: 2024-08-28 | End: 2024-08-29

## 2024-08-28 RX ORDER — SODIUM,POTASSIUM PHOSPHATES 280-250MG
2 POWDER IN PACKET (EA) ORAL EVERY 4 HOURS
Status: CANCELLED | OUTPATIENT
Start: 2024-08-28 | End: 2024-08-28

## 2024-08-28 RX ORDER — SODIUM,POTASSIUM PHOSPHATES 280-250MG
2 POWDER IN PACKET (EA) ORAL EVERY 4 HOURS
Status: COMPLETED | OUTPATIENT
Start: 2024-08-28 | End: 2024-08-28

## 2024-08-28 RX ORDER — POTASSIUM CHLORIDE 20 MEQ/1
40 TABLET, EXTENDED RELEASE ORAL
Status: COMPLETED | OUTPATIENT
Start: 2024-08-28 | End: 2024-08-28

## 2024-08-28 RX ORDER — FLUDROCORTISONE ACETATE 0.1 MG/1
100 TABLET ORAL DAILY
Status: DISCONTINUED | OUTPATIENT
Start: 2024-08-28 | End: 2024-08-30

## 2024-08-28 RX ORDER — METOPROLOL TARTRATE 25 MG/1
25 TABLET, FILM COATED ORAL 2 TIMES DAILY
Status: CANCELLED | OUTPATIENT
Start: 2024-08-28

## 2024-08-28 RX ORDER — METOPROLOL SUCCINATE 50 MG/1
50 TABLET, EXTENDED RELEASE ORAL DAILY
Status: CANCELLED | OUTPATIENT
Start: 2024-08-28

## 2024-08-28 RX ADMIN — PANTOPRAZOLE SODIUM 40 MG: 40 TABLET, DELAYED RELEASE ORAL at 08:08

## 2024-08-28 RX ADMIN — PANTOPRAZOLE SODIUM 40 MG: 40 TABLET, DELAYED RELEASE ORAL at 03:08

## 2024-08-28 RX ADMIN — SODIUM CHLORIDE: 9 INJECTION, SOLUTION INTRAVENOUS at 09:08

## 2024-08-28 RX ADMIN — POTASSIUM & SODIUM PHOSPHATES POWDER PACK 280-160-250 MG 2 PACKET: 280-160-250 PACK at 08:08

## 2024-08-28 RX ADMIN — POTASSIUM & SODIUM PHOSPHATES POWDER PACK 280-160-250 MG 2 PACKET: 280-160-250 PACK at 01:08

## 2024-08-28 RX ADMIN — FLUTICASONE FUROATE AND VILANTEROL TRIFENATATE 1 PUFF: 100; 25 POWDER RESPIRATORY (INHALATION) at 08:08

## 2024-08-28 RX ADMIN — POTASSIUM CHLORIDE 40 MEQ: 1500 TABLET, EXTENDED RELEASE ORAL at 01:08

## 2024-08-28 RX ADMIN — POTASSIUM CHLORIDE 40 MEQ: 1500 TABLET, EXTENDED RELEASE ORAL at 08:08

## 2024-08-28 RX ADMIN — POLYETHYLENE GLYCOL 3350 17 G: 17 POWDER, FOR SOLUTION ORAL at 03:08

## 2024-08-28 RX ADMIN — APIXABAN 5 MG: 5 TABLET, FILM COATED ORAL at 08:08

## 2024-08-28 RX ADMIN — FLUDROCORTISONE ACETATE 100 MCG: 0.1 TABLET ORAL at 09:08

## 2024-08-28 NOTE — HOSPITAL COURSE
Misa Barajas is a 78 y.o. F admitted to hospital medicine for syncope and orthostatic hypotension. GI was consulted for symptomatic anemia. CBC was trended and remained stable after 1unit pRBC. There was no melena, hematochezia, hematemesis, or other signs of active bleeding. GI recommended PPI, but no endoscopy. Telemetry monitoring did not show any afib or flutter. EKG did reveal a bifascicular block. Cardiology curbsided, and bifascicular block felt very unlikely to be contributing to lightheadeness/syncope. Home antihypertensives were held. Infectious workup negative. She received additional 1L IVF on HD1 and HD2. Orthostatic vitals were monitored each day, and the degree of drop in SBP going from laying to sitting continued to improve. Notably, on echo her CVP was 3 mmHg, consistent with intravascular volume depletion. No signs of acute decompensated heart failure (SOB, leg swelling). She continued to feel lightheaded with going from laying to sitting. With ongoing fluid resuscitation and one dose of fludrocortisone, the lightheadedness with sitting/standing eventually resolved and SBP only dropped 4 mmHg going from laying to sitting. Dehydration and intravascular volume depletion is most likely etiology for orthostasis, in setting of recent hospitalization (ERCP and sphincterotomy for gallstone pancreatitis) and diuretic use outpatient, and likely reduced PO intake. With ongoing fluid resuscitation, Hgb slowly downtrended along with WBC and platelet count. Consistent with hemodilution. No signs of bleeding. Continued to monitor CBC. Fludricortisone was discontinued and began to reintroduce home meds, starting with Metoprolol. Spade she has a history of orthostatic hypotension in the past that resolved after stopping Losartan, however Losartan was resumed after most recent hospitalization. Will discontinue this medication. She was determined to be stable for discharge, and then she developed shortness of  breath and anxiety. Anxiety improved with PRN Vistaril. Exam was notable for volume overload (BLE 2+ pitting edema, rales). Diureses with IV lasix. CXR ordered and showed pulmonary interstitial edema. Pt improved with diuresis. Got trazodone for trouble sleeping and anxiety, which helped. She was determined to be medically stable for discharge to [facility].

## 2024-08-28 NOTE — SUBJECTIVE & OBJECTIVE
Interval History: Patient last BM was 3 days ago. Got miralax yesterday and still has not passed a BM. This morning she reports feeling generally well overall. We attempted to repeat orthostatic vitals this morning after fluid repletion, however she got very lightheaded and dizzy going from laying to sitting up and SBP dropped from 146 to 116.   She otherwise has no systemic complaints. Denies any chest pain, SOB, decline in appetite, abdominal pain.       Objective:     Vital Signs (Most Recent):  Temp: 97.7 °F (36.5 °C) (08/28/24 1531)  Pulse: 88 (08/28/24 1531)  Resp: 20 (08/28/24 1531)  BP: 139/63 (08/28/24 1531)  SpO2: 95 % (08/28/24 1531) Vital Signs (24h Range):  Temp:  [97.4 °F (36.3 °C)-98.2 °F (36.8 °C)] 97.7 °F (36.5 °C)  Pulse:  [] 88  Resp:  [15-20] 20  SpO2:  [93 %-98 %] 95 %  BP: (114-182)/(56-77) 139/63     Weight: 77.1 kg (170 lb)  Body mass index is 29.18 kg/m².    Intake/Output Summary (Last 24 hours) at 8/28/2024 1616  Last data filed at 8/28/2024 1400  Gross per 24 hour   Intake 698 ml   Output 550 ml   Net 148 ml         Physical Exam  Vitals and nursing note reviewed.   Constitutional:       Appearance: Normal appearance.   HENT:      Head: Normocephalic and atraumatic.      Right Ear: External ear normal.      Left Ear: External ear normal.      Nose: Nose normal.      Mouth/Throat:      Mouth: Mucous membranes are moist.   Eyes:      Pupils: Pupils are equal, round, and reactive to light.   Cardiovascular:      Rate and Rhythm: Normal rate and regular rhythm.      Pulses: Normal pulses.   Pulmonary:      Effort: Pulmonary effort is normal. No respiratory distress.      Breath sounds: No wheezing or rales.   Abdominal:      Palpations: Abdomen is soft.   Musculoskeletal:      Cervical back: Normal range of motion.      Right lower leg: No edema.      Left lower leg: No edema.   Skin:     General: Skin is warm.   Neurological:      General: No focal deficit present.      Mental Status:  She is alert and oriented to person, place, and time.   Psychiatric:         Mood and Affect: Mood normal.         Behavior: Behavior normal.         Thought Content: Thought content normal.             Significant Labs: All pertinent labs within the past 24 hours have been reviewed.    Significant Imaging: I have reviewed all pertinent imaging results/findings within the past 24 hours.

## 2024-08-28 NOTE — ASSESSMENT & PLAN NOTE
S/p ablation with EP. Has follow-up with Dr. Church in September. Currently on metoprolol and Eliquis.   [id number] Score: 6. EKG with 1st degree avb and rbbb seen on previous EKGs. No afib/flutter seen on telemetry. Eliquis initially held in the ED due to mild anemia, however no longer concerned for an acute bleed.    Plan  - monitor on telemetry  - Resumed home Eliquis 2.5 BID  - F/u w/ Dr. Church, EP, next month

## 2024-08-28 NOTE — PROGRESS NOTES
"[facility] Medicine  Progress Note    Patient Name: Misa Barajas  MRN: [id number]  Patient Class: IP- Inpatient   Admission Date: 2024  Length of Stay: 1 days  Attending Physician: Lawrence Campos MD  Primary Care Provider: Celia Carlisle MD        Subjective:     Principal Problem:Syncope        HPI:  Ms. Barajas is a 78 year old female with a history of COPD chronically on 2L, CKD, HFpEF, remote hx of breast cx s/p chemo c/b polyneuropathy, paroxysmal Afib/flutter who presents from [facility] for new onset dizziness that started around 7am this morning. Patient states she was taking a shower with her nursing aid this morning when she acutely began feeling like she was about to pass out. She denies that the room was spinning but instead endorses lightheadedness. She states she may have passed out for 1-2 minutes as per her nursing aid and remembers being held up by the nursing home staff. She denies any chest pain, palpitations, dyspnea, or any other symptoms prior to becoming dizzy. She denies any post-syncopal symptoms as well. She currently states she feels fine but may become "dizzy" again if she were to stand up. She also denies any new symptoms including rashes, wounds, dysuria, hematuria, hematemesis, abdominal pain, fever, chills. She is unsure if she has melena as nursing home staff changes her diapers. She states this has never happened in the past.     She has had multiple admissions over the last couple of months includin/7- HF exacerbation, AIN, E.faecalis UTI.   - admission for cellulitis and pastruella bacteremia s/p 2 week treatment w/ unaysyn and doxycline.  - admission for gallstone pancreatitis w/ possible early cholecystitis s/p ERCP w/ sphincterotomy on .     I spoke with the patient's daughter over the phone Marina. She states the patient began having symptoms of dizziness around 3 weeks ago. She noticed it began after " her ERCP w/ sphincterotomy. While in rehab the daughter states patient has been persistently orthostatic with low blood pressures and dizziness with standing. The SNF attempted to use increasing doses midodrine and meclizine however she continued to be dizzy and orthostatic.     In the ED patient was noted to be orthostatic with /62 HR 82 when lying and  w/ BP 65/35 when standing. Lactic acid was 1.6, labs otherwise remarkable for downtrending Hgb 10->8. She was started on 1U PRBC and fluids and admitted for further work-up.              Overview/Hospital Course:  Misa Barajas is a 78 y.o. F admitted to hospital medicine for syncope and orthostatic hypotension. As far as syncope workup, she had positive orthostatic hypotension with initial orthostatic vitals in the ED. Thought to be symptomatic from dehydration. Volume repletion with IVF. Mild anemia on admission and ED report of blood on ZAHEER. GI consulted. She got 1 unit pRBC and Hgb trended and remained stable. GI recommended PPI, but no endoscopy. Labs notable for hypokalemia, which was appropriately repleted. Home antihypertensives were held due to concern for hypotension, however she was not hypotensive during her hospital stay. Actually more hypertensive with SBP > 130 on average. Further syncope work up included a CT head which was negative for any acute abnormality and Echo which revealed normal systolic function (EF 60-65%) and diastolic function. Telemetry monitoring did not show any afib or flutter. EKG did reveal a bifascicular block, however unlikely to be contributing.     Interval History: Patient last BM was 3 days ago. Got miralax yesterday and still has not passed a BM. This morning she reports feeling generally well overall. We attempted to repeat orthostatic vitals this morning after fluid repletion, however she got very lightheaded and dizzy going from laying to sitting up and SBP dropped from 146 to 116.   She otherwise has no  systemic complaints. Denies any chest pain, SOB, decline in appetite, abdominal pain.       Objective:     Vital Signs (Most Recent):  Temp: 97.7 °F (36.5 °C) (08/28/24 1531)  Pulse: 88 (08/28/24 1531)  Resp: 20 (08/28/24 1531)  BP: 139/63 (08/28/24 1531)  SpO2: 95 % (08/28/24 1531) Vital Signs (24h Range):  Temp:  [97.4 °F (36.3 °C)-98.2 °F (36.8 °C)] 97.7 °F (36.5 °C)  Pulse:  [] 88  Resp:  [15-20] 20  SpO2:  [93 %-98 %] 95 %  BP: (114-182)/(56-77) 139/63     Weight: 77.1 kg (170 lb)  Body mass index is 29.18 kg/m².    Intake/Output Summary (Last 24 hours) at 8/28/2024 1616  Last data filed at 8/28/2024 1400  Gross per 24 hour   Intake 698 ml   Output 550 ml   Net 148 ml         Physical Exam  Vitals and nursing note reviewed.   Constitutional:       Appearance: Normal appearance.   HENT:      Head: Normocephalic and atraumatic.      Right Ear: External ear normal.      Left Ear: External ear normal.      Nose: Nose normal.      Mouth/Throat:      Mouth: Mucous membranes are moist.   Eyes:      Pupils: Pupils are equal, round, and reactive to light.   Cardiovascular:      Rate and Rhythm: Normal rate and regular rhythm.      Pulses: Normal pulses.   Pulmonary:      Effort: Pulmonary effort is normal. No respiratory distress.      Breath sounds: No wheezing or rales.   Abdominal:      Palpations: Abdomen is soft.   Musculoskeletal:      Cervical back: Normal range of motion.      Right lower leg: No edema.      Left lower leg: No edema.   Skin:     General: Skin is warm.   Neurological:      General: No focal deficit present.      Mental Status: She is alert and oriented to person, place, and time.   Psychiatric:         Mood and Affect: Mood normal.         Behavior: Behavior normal.         Thought Content: Thought content normal.             Significant Labs: All pertinent labs within the past 24 hours have been reviewed.    Significant Imaging: I have reviewed all pertinent imaging results/findings within  the past 24 hours.    Assessment/Plan:      * Syncope  Ms. Barajas is an 78 year old female with history as above who presents from her nursing home for syncopal episode while taking a shower at her nursing home. Per her daughter, patient has had periods of lightheadedness over the past 3 weeks, since her ERCP w/ sphincterotomy. States her blood pressure has been low in the SNF despite midodrine treatment. Patient states she had no pro-dromal or post syncopal symptoms. EKG w/ 1st degree AVB, unchanged from previous. She was not taking any BP meds as the nursing home was holding them. Differentials include orthostasis, cardiac related given history of aflutter s/p ablation, neurologic related, drug induced although less likely as nursing home was holding her blood pressure medications and she does not take buspar. Positive orthostatic vitals in the ED and again on 8/28 after receiving 2L volume (1L IVF and 1 unit pRBC). CT head negative for any acute intracranial abnormalities. Echo shows normal systolic function (EF 60-65%) and normal diastolic function. EKG shows a bifascicular block, however this is similar to EKG 1 year ago prior to the onset of dizziness/lightheadedness. Autonomic neuropathy on differential as a cause of orthostatic hypotension.     Plan:  - Continue to hold BP medications  - continue telemetry  - can consider tilt table testing w/ EP as outpatient if problem persists  - Will give additional 1L IVF today  - Started on Fludrocortisone   - Check AM cortisol to evaluate for adrenal insufficiency      Orthostatic hypotension  Persistent orthostasis as reported by daughter over the past 3 weeks. Unclear etiology, trial of holding BP meds and starting midodrine unsuccessful. Differentials include drug induced, hypovolemia (?lasix), bleeding, neurologic given prior polyneuropathy (less likely), hypothyroid (last TSH elevated to >4 in 2021), infection. S/p 1U PRBC    - f/u TSH  - Hold anticoagulation  and BP meds, GI consulted to evaluate for post sphincterotomy bleed  - F/u basic infectious work-up    Acute blood loss anemia  Appears her baseline was 10-11 over the past couple of months and has down trended to 8.2 on admission. Per ED there was blood noted on ZAHEER and concern for oozing from prior sphincterotomy site done on 7/30 given she is also on Eliquis. She received 1U PRBC in the ED. Hgb has remained stable on repeat CBC. No additional blood noted. GI consulted and recommended PPI and continued CBC trending, but no plans for endoscopy. Per GI, ok to resume eliquis.    - PPI: protonix 40 mg BID  - Continue to monitor CBC  - Restart Eliquis.    Typical atrial flutter  S/p ablation with EP. Has follow-up with Dr. Church in September. Currently on metoprolol and Eliquis.   [id number] Score: 6. EKG with 1st degree avb and rbbb seen on previous EKGs. No afib/flutter seen on telemetry. Eliquis initially held in the ED due to mild anemia, however no longer concerned for an acute bleed.    Plan  - monitor on telemetry  - Resumed home Eliquis 2.5 BID  - F/u w/ Dr. Church, EP, next month      Diastolic CHF, chronic  Noted history. On lasix 40 at home.  Echo 8/27: EF 60-65%, normal systolic function, normal diastolic function, PASP 34 mmHg, CVP 3. No overt signs of fluid overload on exam.    Plan:  - Hold furosemide      COPD (chronic obstructive pulmonary disease)  Patient's COPD is controlled currently.  Patient is currently off COPD Pathway. Continue scheduled inhalers and monitor respiratory status closely.     - On home 2L, no wheezing on my exam  - CXR on admission  - Breo with prn albuterol while inpatient    Paroxysmal atrial fibrillation  Patient with Paroxysmal (<7 days) atrial fibrillation which is controlled currently with Beta Blocker. Patient is currently in sinus rhythm.[id number] Score: 6. HASBLED Score: 4. Anticoagulation not indicated due to bleed .    - See Atrial flutter    CKD (chronic kidney  disease) stage 3, GFR 30-59 ml/min  Creatine stable for now. BMP reviewed- noted Estimated Creatinine Clearance: 41 mL/min (based on SCr of 1.1 mg/dL). according to latest data. Based on current GFR, CKD stage is stage 3 - GFR 30-59.  Monitor UOP and serial BMP and adjust therapy as needed. Renally dose meds. Avoid nephrotoxic medications and procedures.        ACP (advance care planning)  Patient made DNR in the ED. I confirmed with patient and her daughter about code status. They have prior paperwork signed, not in our system.        VTE Risk Mitigation (From admission, onward)           Ordered     apixaban tablet 5 mg  2 times daily         08/27/24 1454     Reason for No Pharmacological VTE Prophylaxis  Once        Question:  Reasons:  Answer:  Active Bleeding    08/26/24 2051     IP VTE HIGH RISK PATIENT  Once         08/26/24 2051     Place sequential compression device  Until discontinued         08/26/24 2033                    Discharge Planning   NOHEMI: 8/30/2024     Code Status: DNR   Is the patient medically ready for discharge?:     Reason for patient still in hospital (select all that apply): Patient trending condition  Discharge Plan A: Skilled Nursing Facility                  Lucero Post MD  Department of Hospital Medicine   [facility] Step[facility]

## 2024-08-28 NOTE — ASSESSMENT & PLAN NOTE
Noted history. On lasix 40 at home.  Echo 8/27: EF 60-65%, normal systolic function, normal diastolic function, PASP 34 mmHg, CVP 3. No overt signs of fluid overload on exam.    Plan:  - Hold furosemide

## 2024-08-28 NOTE — ASSESSMENT & PLAN NOTE
Ms. Barajas is an 78 year old female with history as above who presents from her nursing home for syncopal episode while taking a shower at her nursing home. Per her daughter, patient has had periods of lightheadedness over the past 3 weeks, since her ERCP w/ sphincterotomy. States her blood pressure has been low in the SNF despite midodrine treatment. Patient states she had no pro-dromal or post syncopal symptoms. EKG w/ 1st degree AVB, unchanged from previous. She was not taking any BP meds as the nursing home was holding them. Differentials include orthostasis, cardiac related given history of aflutter s/p ablation, neurologic related, drug induced although less likely as nursing home was holding her blood pressure medications and she does not take buspar. Positive orthostatic vitals in the ED and again on 8/28 after receiving 2L volume (1L IVF and 1 unit pRBC). CT head negative for any acute intracranial abnormalities. Echo shows normal systolic function (EF 60-65%) and normal diastolic function. EKG shows a bifascicular block, however this is similar to EKG 1 year ago prior to the onset of dizziness/lightheadedness. Autonomic neuropathy on differential as a cause of orthostatic hypotension.     Plan:  - Continue to hold BP medications  - continue telemetry  - can consider tilt table testing w/ EP as outpatient if problem persists  - Will give additional 1L IVF today  - Started on Fludrocortisone   - Check AM cortisol to evaluate for adrenal insufficiency

## 2024-08-28 NOTE — ASSESSMENT & PLAN NOTE
Appears her baseline was 10-11 over the past couple of months and has down trended to 8.2 on admission. Per ED there was blood noted on ZAHEER and concern for oozing from prior sphincterotomy site done on 7/30 given she is also on Eliquis. She received 1U PRBC in the ED. Hgb has remained stable on repeat CBC. No additional blood noted. GI consulted and recommended PPI and continued CBC trending, but no plans for endoscopy. Per GI, ok to resume eliquis.    - PPI: protonix 40 mg BID  - Continue to monitor CBC  - Restart Eliquis.

## 2024-08-28 NOTE — PLAN OF CARE
MARINE spoke to pt's daughter, Marina Gruber via phone to discuss pt's discharge plan. Plan is for pt to return to [facility]. MARINE contacting Admissions Coordinator, Erna at [facility] (p) [phone] regarding pt's SNF return. [facility]. Awaiting therapy recs. Erna planning to submit for authorization to pt's [employer] Medicare Plan. Therapy notes are needed for authorization. NOHEMI: tomorrow, 8/29/24.    Shemar Han LMSW

## 2024-08-28 NOTE — PLAN OF CARE
Problem: Occupational Therapy  Goal: Occupational Therapy Goal  Description: Goals to be met by: 9/28/24     Patient will increase functional independence with ADLs by performing:    UE Dressing with Modified [place] and Assistive Devices as needed.  LE Dressing with Modified [place] and Assistive Devices as needed.  Grooming while standing at sink with Modified [place].  Toileting from toilet with Modified [place] for hygiene and clothing management.   Bathing from  shower chair/bench with Modified [place].  Toilet transfer to toilet with Modified [place].  Increased functional strength to WFL for B UE.  Upper extremity exercise program x15 reps per handout, with assistance as needed.    Outcome: Progressing

## 2024-08-28 NOTE — PLAN OF CARE
Orthostatic LH and Hypotension    Dr Post reached out to us to discuss fluid management and LH  She got about 2 L IVF so far and 1 u red cells  Bifascicular block is old at least since 2017 and not causing symptoms as on tele here and still LH here while getting up  Again, SBP dropped by 30 with HR increase by 18 to 117  Appears to be stil volume down, continue IVF hydration and hold lasix for now  Repeat orthostatic VS tomorrow  D/w Dr Bruno

## 2024-08-28 NOTE — PT/OT/SLP EVAL
Occupational Therapy   Evaluation    Name: Misa Barajas  MRN: [id number]  Admitting Diagnosis: Syncope  Recent Surgery: * No surgery found *      Recommendations:     Discharge Recommendations: Moderate Intensity Therapy  Discharge Equipment Recommendations:  walker, rolling  Barriers to discharge:  Decreased caregiver support    Assessment:     Misa Barajas is a 78 y.o. female with a medical diagnosis of Syncope.  She presents with syncope. Performance deficits affecting function: weakness, impaired endurance, impaired self care skills, impaired functional mobility, impaired balance, impaired cardiopulmonary response to activity.  Pt was able to perform supine/sit T/F c CGA and sit/stand min A x2 and RW.  Pt leaning posteriorly in standing and required cues to maintain center of balance.  She had c/o dizziness sitting EOB and BP was found to be 137/68.  After standing BP decreased to 90/52.  Returned pt back to supine and RN notified.  Her B UE are WFL.  She stood for approx. 2 minutes c max A for poor static/dynamic standing balance. Patient demonstrates a mobility limitation that significantly impairs their ability to participate in one or more mobility related activities of daily living. Patient's mobility limitation cannot be sufficiently resolved with the use of a cane, but can be sufficiently resolved with the use of a rolling walker.The use of a rolling walker will considerably improve their ability to participate in MRADLs. Patient will use the walker on a regular basis at home.       Rehab Prognosis: Good; patient would benefit from acute skilled OT services to address these deficits and reach maximum level of function.       Plan:     Patient to be seen 4 x/week to address the above listed problems via self-care/home management, therapeutic activities, therapeutic exercises  Plan of Care Expires: 09/28/24  Plan of Care Reviewed with: patient, daughter    Subjective     Chief Complaint:  "Syncope  Patient/Family Comments/goals: "I'm feeling kind of dizzy right now."    Occupational Profile:  Living Environment: Pt lives in a one story house c a threshold to enter.  Has a walk-in shower c a small step to enter.  Previous level of function: I PTA before she went to SNF.  Roles and Routines: Retired  Equipment Used at Home: rollator, shower chair, bedside commode, grab bar, oxygen  Assistance upon Discharge: Pt lives alone but she has daughters that can check in on her at times.    Pain/Comfort:  Pain Rating 1: 0/10    Patients cultural, spiritual, Shinto conflicts given the current situation: no    Objective:     Communicated with: RN prior to session.  Patient found supine with blood pressure cuff, oxygen, peripheral IV, telemetry, PureWick upon OT entry to room.    General Precautions: Standard, fall  Orthopedic Precautions: N/A  Braces: N/A  Respiratory Status: Nasal cannula, flow 2 L/min    Occupational Performance:    Bed Mobility:    Patient completed Supine to Sit with contact guard assistance  Patient completed Sit to Supine with contact guard assistance    Functional Mobility/Transfers:  Patient completed Sit <> Stand Transfer with minimum assistance and of 2 persons  with  rolling walker   Functional Mobility: Pt was able to tolerate standing for approx. 2 minutes c max A.  Unable to walk d/t syncope/hypotension.  She had c/o dizziness while standing.    Activities of Daily Living:  Upper Body Dressing: maximal assistance to don hospital gown.  Pt declined to perform grooming tasks at this time.    Cognitive/Visual Perceptual:  Cognitive/Psychosocial Skills:     -       Oriented to: Person, Place, Time, and Situation   -       Follows Commands/attention:Follows multistep  commands  -       Communication: clear/fluent  -       Memory: No Deficits noted  -       Safety awareness/insight to disability: intact   -       Mood/Affect/Coping skills/emotional control: Appropriate to " situation    Physical Exam:  Upper Extremity Range of Motion:     -       Right Upper Extremity: WFL  -       Left Upper Extremity: WFL  Upper Extremity Strength:    -       Right Upper Extremity: WFL  -       Left Upper Extremity: WFL    AMPAC 6 Click ADL:  AMPAC Total Score: 14    Patient left supine with all lines intact, call button in reach, RN notified, and daughter present    GOALS:   Multidisciplinary Problems       Occupational Therapy Goals          Problem: Occupational Therapy    Goal Priority Disciplines Outcome Interventions   Occupational Therapy Goal     OT, PT/OT Progressing    Description: Goals to be met by: 9/28/24     Patient will increase functional independence with ADLs by performing:    UE Dressing with Modified [place] and Assistive Devices as needed.  LE Dressing with Modified [place] and Assistive Devices as needed.  Grooming while standing at sink with Modified [place].  Toileting from toilet with Modified [place] for hygiene and clothing management.   Bathing from  shower chair/bench with Modified [place].  Toilet transfer to toilet with Modified [place].  Increased functional strength to WFL for B UE.  Upper extremity exercise program x15 reps per handout, with assistance as needed.                         History:     Past Medical History:   Diagnosis Date    Acute biliary pancreatitis 7/27/2024    Acute hypoxemic respiratory failure 6/26/2021    Acute superficial venous thrombosis of lower extremity, bilateral 1/25/2022    Anemia 7/12/2024    Aortic atherosclerosis     Arthritis     knee joint pain    Asthma-COPD overlap syndrome 8/22/2022    Breast cancer 2002    left breast & lymph nodes-s/p sx with chemo    Bronchitis     with flu-2/2014    Cataracts, bilateral     Chronic anticoagulation 5/4/2022    Chronic diastolic heart failure 12/24/2019    Chronic kidney disease, stage 3     Class 1 obesity due to excess calories with serious comorbidity and  body mass index (BMI) of 30.0 to 30.9 in adult 5/16/2024    History of chemotherapy     last treatment 12/2002 (had 8 treatments)    Hyperlipidemia 11/20/2016    Hypertension     Lymphedema of both lower extremities 1/25/2022    Nephrolithiasis 6/2/2014    Osteoporosis     Paroxysmal atrial fibrillation 6/7/2021    Renal calculi     hematuria    Renal osteodystrophy 1/14/2016    Venous stasis dermatitis of both lower extremities 1/25/2022    Vitamin D insufficiency          Past Surgical History:   Procedure Laterality Date    ABLATION, ATRIAL FLUTTER, TYPICAL N/A 6/17/2024    Procedure: Ablation, Atrial Flutter, Typical;  Surgeon: Taz Church MD;  Location: [facility] EP LAB;  Service: Cardiology;  Laterality: N/A;  AFL, RFA, LORENE, MAKAYLA (cx if SR), LEANNE miller, 3 Prep    BREAST BIOPSY Left 2002    core bx, +    BREAST BIOPSY Right 2018    core    BREAST BIOPSY Right 2019    BREAST LUMPECTOMY Left 2002    CYSTOSCOPY  4/28/2022    Procedure: CYSTOSCOPY;  Surgeon: Vik Ware MD;  [facility];  Service: Urology;;    CYSTOSCOPY  5/30/2022    Procedure: CYSTOSCOPY;  Surgeon: Bonnie Knutson MD;  Location: [facility] OR 1ST FLR;  Service: Urology;;    ECHOCARDIOGRAM,TRANSESOPHAGEAL N/A 6/17/2024    Procedure: Transesophageal echo (MAKAYLA) intra-procedure log documentation;  Surgeon: Nestor Martinez MD;  Location: [facility] EP LAB;  Service: Cardiology;  Laterality: N/A;    ERCP N/A 7/30/2024    Procedure: ERCP (ENDOSCOPIC RETROGRADE CHOLANGIOPANCREATOGRAPHY);  Surgeon: Sierra Cotto MD;  Location: [facility] ENDO (2ND FLR);  Service: Endoscopy;  Laterality: N/A;    LASER LITHOTRIPSY  5/30/2022    Procedure: LITHOTRIPSY, USING LASER;  Surgeon: Bonnie Knutson MD;  Location: [facility] OR 1ST FLR;  Service: Urology;;    LITHOTRIPSY      MASTECTOMY Left 06/2002    left-& lymph node dissection    REPLACEMENT OF STENT Right 5/30/2022    Procedure: REPLACEMENT, STENT;  Surgeon: Bonnie Knutson MD;  Location:  [facility] OR 1ST FLR;  Service: Urology;  Laterality: Right;    RETROGRADE PYELOGRAPHY Right 4/28/2022    Procedure: PYELOGRAM, RETROGRADE;  Surgeon: Vik Ware MD;  [facility];  Service: Urology;  Laterality: Right;    ROBOT-ASSISTED LAPAROSCOPIC PARTIAL NEPHRECTOMY USING DA JESÚS XI Right 3/11/2021    Procedure: XI ROBOTIC NEPHRECTOMY, PARTIAL;  Surgeon: Taz Ortega MD;  Location: [facility] OR 2ND FLR;  Service: Urology;  Laterality: Right;  4hr/ gen with regional  [facility] confirmation:  [id number] for 11:15am case NC    URETEROSCOPIC REMOVAL OF URETERIC CALCULUS Right 5/30/2022    Procedure: REMOVAL, CALCULUS, URETER, URETEROSCOPIC;  Surgeon: Bonnie Knutson MD;  Location: [facility] OR [address] [place] [address];  Service: Urology;  Laterality: Right;    ureteroscopy Bilateral     6.14    URETEROSCOPY Right 5/30/2022    Procedure: URETEROSCOPY;  Surgeon: Bonnie Knutson MD;  Location: [facility] OR [address] [place] [address];  Service: Urology;  Laterality: Right;       Time Tracking:     OT Date of Treatment: 08/28/24  OT Start Time: 1441  OT Stop Time: 1511  OT Total Time (min): 30 min    Billable Minutes:Evaluation 15  Self Care/Home Management 15    8/28/2024

## 2024-08-29 PROBLEM — D68.69 HYPERCOAGULABILITY DUE TO ATRIAL FIBRILLATION: Status: ACTIVE | Noted: 2024-05-28

## 2024-08-29 PROBLEM — I95.1 ORTHOSTATIC HYPOTENSION: Status: ACTIVE | Noted: 2024-08-26

## 2024-08-29 PROBLEM — I48.91 HYPERCOAGULABILITY DUE TO ATRIAL FIBRILLATION: Status: ACTIVE | Noted: 2024-05-28

## 2024-08-29 LAB
ALBUMIN SERPL BCP-MCNC: 2.1 G/DL (ref 3.5–5.2)
ALP SERPL-CCNC: 111 U/L (ref 55–135)
ALT SERPL W/O P-5'-P-CCNC: 20 U/L (ref 10–44)
ANION GAP SERPL CALC-SCNC: 6 MMOL/L (ref 8–16)
AST SERPL-CCNC: 33 U/L (ref 10–40)
BASOPHILS # BLD AUTO: 0.05 K/UL (ref 0–0.2)
BASOPHILS # BLD AUTO: 0.06 K/UL (ref 0–0.2)
BASOPHILS NFR BLD: 0.6 % (ref 0–1.9)
BASOPHILS NFR BLD: 0.7 % (ref 0–1.9)
BILIRUB SERPL-MCNC: 1.2 MG/DL (ref 0.1–1)
BUN SERPL-MCNC: 16 MG/DL (ref 8–23)
CALCIUM SERPL-MCNC: 9.5 MG/DL (ref 8.7–10.5)
CHLORIDE SERPL-SCNC: 115 MMOL/L (ref 95–110)
CO2 SERPL-SCNC: 24 MMOL/L (ref 23–29)
CREAT SERPL-MCNC: 1.1 MG/DL (ref 0.5–1.4)
DIFFERENTIAL METHOD BLD: ABNORMAL
DIFFERENTIAL METHOD BLD: ABNORMAL
EOSINOPHIL # BLD AUTO: 0.3 K/UL (ref 0–0.5)
EOSINOPHIL # BLD AUTO: 0.3 K/UL (ref 0–0.5)
EOSINOPHIL NFR BLD: 3.2 % (ref 0–8)
EOSINOPHIL NFR BLD: 3.3 % (ref 0–8)
ERYTHROCYTE [DISTWIDTH] IN BLOOD BY AUTOMATED COUNT: 19.1 % (ref 11.5–14.5)
ERYTHROCYTE [DISTWIDTH] IN BLOOD BY AUTOMATED COUNT: 19.6 % (ref 11.5–14.5)
EST. GFR  (NO RACE VARIABLE): 50.8 ML/MIN/1.73 M^2
GLUCOSE SERPL-MCNC: 96 MG/DL (ref 70–110)
HCT VFR BLD AUTO: 28.6 % (ref 37–48.5)
HCT VFR BLD AUTO: 29.6 % (ref 37–48.5)
HGB BLD-MCNC: 8.9 G/DL (ref 12–16)
HGB BLD-MCNC: 9 G/DL (ref 12–16)
IMM GRANULOCYTES # BLD AUTO: 0.02 K/UL (ref 0–0.04)
IMM GRANULOCYTES # BLD AUTO: 0.03 K/UL (ref 0–0.04)
IMM GRANULOCYTES NFR BLD AUTO: 0.2 % (ref 0–0.5)
IMM GRANULOCYTES NFR BLD AUTO: 0.4 % (ref 0–0.5)
LYMPHOCYTES # BLD AUTO: 1.7 K/UL (ref 1–4.8)
LYMPHOCYTES # BLD AUTO: 1.9 K/UL (ref 1–4.8)
LYMPHOCYTES NFR BLD: 20 % (ref 18–48)
LYMPHOCYTES NFR BLD: 20.6 % (ref 18–48)
MAGNESIUM SERPL-MCNC: 2.1 MG/DL (ref 1.6–2.6)
MCH RBC QN AUTO: 28.7 PG (ref 27–31)
MCH RBC QN AUTO: 29 PG (ref 27–31)
MCHC RBC AUTO-ENTMCNC: 30.1 G/DL (ref 32–36)
MCHC RBC AUTO-ENTMCNC: 31.5 G/DL (ref 32–36)
MCV RBC AUTO: 92 FL (ref 82–98)
MCV RBC AUTO: 96 FL (ref 82–98)
MONOCYTES # BLD AUTO: 0.9 K/UL (ref 0.3–1)
MONOCYTES # BLD AUTO: 1.2 K/UL (ref 0.3–1)
MONOCYTES NFR BLD: 10.7 % (ref 4–15)
MONOCYTES NFR BLD: 12.7 % (ref 4–15)
NEUTROPHILS # BLD AUTO: 5.5 K/UL (ref 1.8–7.7)
NEUTROPHILS # BLD AUTO: 5.7 K/UL (ref 1.8–7.7)
NEUTROPHILS NFR BLD: 62.7 % (ref 38–73)
NEUTROPHILS NFR BLD: 64.9 % (ref 38–73)
NRBC BLD-RTO: 0 /100 WBC
NRBC BLD-RTO: 0 /100 WBC
OHS QRS DURATION: 162 MS
OHS QTC CALCULATION: 538 MS
PHOSPHATE SERPL-MCNC: 2.6 MG/DL (ref 2.7–4.5)
PLATELET # BLD AUTO: 265 K/UL (ref 150–450)
PLATELET # BLD AUTO: 266 K/UL (ref 150–450)
PMV BLD AUTO: 10 FL (ref 9.2–12.9)
PMV BLD AUTO: 9.9 FL (ref 9.2–12.9)
POTASSIUM SERPL-SCNC: 4.4 MMOL/L (ref 3.5–5.1)
PROT SERPL-MCNC: 5.2 G/DL (ref 6–8.4)
RBC # BLD AUTO: 3.1 M/UL (ref 4–5.4)
RBC # BLD AUTO: 3.1 M/UL (ref 4–5.4)
SODIUM SERPL-SCNC: 145 MMOL/L (ref 136–145)
WBC # BLD AUTO: 8.51 K/UL (ref 3.9–12.7)
WBC # BLD AUTO: 9.05 K/UL (ref 3.9–12.7)

## 2024-08-29 PROCEDURE — 94761 N-INVAS EAR/PLS OXIMETRY MLT: CPT

## 2024-08-29 PROCEDURE — 84100 ASSAY OF PHOSPHORUS: CPT | Performed by: STUDENT IN AN ORGANIZED HEALTH CARE EDUCATION/TRAINING PROGRAM

## 2024-08-29 PROCEDURE — 85025 COMPLETE CBC W/AUTO DIFF WBC: CPT | Mod: 91

## 2024-08-29 PROCEDURE — 25000242 PHARM REV CODE 250 ALT 637 W/ HCPCS

## 2024-08-29 PROCEDURE — 94640 AIRWAY INHALATION TREATMENT: CPT

## 2024-08-29 PROCEDURE — 25000003 PHARM REV CODE 250

## 2024-08-29 PROCEDURE — 93005 ELECTROCARDIOGRAM TRACING: CPT

## 2024-08-29 PROCEDURE — 25000003 PHARM REV CODE 250: Performed by: INTERNAL MEDICINE

## 2024-08-29 PROCEDURE — 99900035 HC TECH TIME PER 15 MIN (STAT)

## 2024-08-29 PROCEDURE — 36415 COLL VENOUS BLD VENIPUNCTURE: CPT

## 2024-08-29 PROCEDURE — 83735 ASSAY OF MAGNESIUM: CPT | Performed by: STUDENT IN AN ORGANIZED HEALTH CARE EDUCATION/TRAINING PROGRAM

## 2024-08-29 PROCEDURE — 27000221 HC OXYGEN, UP TO 24 HOURS

## 2024-08-29 PROCEDURE — 85025 COMPLETE CBC W/AUTO DIFF WBC: CPT | Performed by: STUDENT IN AN ORGANIZED HEALTH CARE EDUCATION/TRAINING PROGRAM

## 2024-08-29 PROCEDURE — 80053 COMPREHEN METABOLIC PANEL: CPT | Performed by: STUDENT IN AN ORGANIZED HEALTH CARE EDUCATION/TRAINING PROGRAM

## 2024-08-29 PROCEDURE — 36415 COLL VENOUS BLD VENIPUNCTURE: CPT | Performed by: STUDENT IN AN ORGANIZED HEALTH CARE EDUCATION/TRAINING PROGRAM

## 2024-08-29 PROCEDURE — 93010 ELECTROCARDIOGRAM REPORT: CPT | Mod: ,,, | Performed by: INTERNAL MEDICINE

## 2024-08-29 PROCEDURE — 20600001 HC STEP DOWN PRIVATE ROOM

## 2024-08-29 RX ORDER — SODIUM CHLORIDE 9 MG/ML
INJECTION, SOLUTION INTRAVENOUS CONTINUOUS
Status: ACTIVE | OUTPATIENT
Start: 2024-08-29 | End: 2024-08-29

## 2024-08-29 RX ADMIN — PANTOPRAZOLE SODIUM 40 MG: 40 TABLET, DELAYED RELEASE ORAL at 03:08

## 2024-08-29 RX ADMIN — APIXABAN 5 MG: 5 TABLET, FILM COATED ORAL at 08:08

## 2024-08-29 RX ADMIN — FLUDROCORTISONE ACETATE 100 MCG: 0.1 TABLET ORAL at 08:08

## 2024-08-29 RX ADMIN — SODIUM CHLORIDE: 9 INJECTION, SOLUTION INTRAVENOUS at 12:08

## 2024-08-29 RX ADMIN — FLUTICASONE FUROATE AND VILANTEROL TRIFENATATE 1 PUFF: 100; 25 POWDER RESPIRATORY (INHALATION) at 09:08

## 2024-08-29 RX ADMIN — DIBASIC SODIUM PHOSPHATE, MONOBASIC POTASSIUM PHOSPHATE AND MONOBASIC SODIUM PHOSPHATE 2 TABLET: 852; 155; 130 TABLET ORAL at 09:08

## 2024-08-29 RX ADMIN — TIOTROPIUM BROMIDE INHALATION SPRAY 2 PUFF: 3.12 SPRAY, METERED RESPIRATORY (INHALATION) at 09:08

## 2024-08-29 RX ADMIN — DIBASIC SODIUM PHOSPHATE, MONOBASIC POTASSIUM PHOSPHATE AND MONOBASIC SODIUM PHOSPHATE 2 TABLET: 852; 155; 130 TABLET ORAL at 01:08

## 2024-08-29 RX ADMIN — PANTOPRAZOLE SODIUM 40 MG: 40 TABLET, DELAYED RELEASE ORAL at 05:08

## 2024-08-29 RX ADMIN — APIXABAN 5 MG: 5 TABLET, FILM COATED ORAL at 09:08

## 2024-08-29 NOTE — PT/OT/SLP PROGRESS
Physical Therapy      Patient Name:  Misa Barajas   MRN:  [id number]    Patient not seen today secondary to: hold in AM per nsg d/t pt becoming orthostatic after transferring from supine to sit. Writing therapist attempted in PM and pt declined evaluation. Will follow-up 8/30 pending medical stability and pt participation.    8/29/2024

## 2024-08-29 NOTE — PROGRESS NOTES
"[facility] - Cardiology [facility] Medicine  Progress Note    Patient Name: Misa Barajas  MRN: [id number]  Patient Class: IP- Inpatient   Admission Date: 2024  Length of Stay: 2 days  Attending Physician: Talha Perdomo MD  Primary Care Provider: Celia Carlisle MD (Inactive)        Subjective:     Principal Problem:Syncope        HPI:  Ms. Barajas is a 78 year old female with a history of COPD chronically on 2L, CKD, HFpEF, remote hx of breast cx s/p chemo c/b polyneuropathy, paroxysmal Afib/flutter who presents from [facility] for new onset dizziness that started around 7am this morning. Patient states she was taking a shower with her nursing aid this morning when she acutely began feeling like she was about to pass out. She denies that the room was spinning but instead endorses lightheadedness. She states she may have passed out for 1-2 minutes as per her nursing aid and remembers being held up by the nursing home staff. She denies any chest pain, palpitations, dyspnea, or any other symptoms prior to becoming dizzy. She denies any post-syncopal symptoms as well. She currently states she feels fine but may become "dizzy" again if she were to stand up. She also denies any new symptoms including rashes, wounds, dysuria, hematuria, hematemesis, abdominal pain, fever, chills. She is unsure if she has melena as nursing home staff changes her diapers. She states this has never happened in the past.     She has had multiple admissions over the last couple of months includin/7- HF exacerbation, AIN, E.faecalis UTI.   - admission for cellulitis and pastruella bacteremia s/p 2 week treatment w/ unaysyn and doxycline.  - admission for gallstone pancreatitis w/ possible early cholecystitis s/p ERCP w/ sphincterotomy on .     I spoke with the patient's daughter over the phone Marina. She states the patient began having symptoms of dizziness around 3 weeks ago. She noticed it " began after her ERCP w/ sphincterotomy. While in rehab the daughter states patient has been persistently orthostatic with low blood pressures and dizziness with standing. The SNF attempted to use increasing doses midodrine and meclizine however she continued to be dizzy and orthostatic.     In the ED patient was noted to be orthostatic with /62 HR 82 when lying and  w/ BP 65/35 when standing. Lactic acid was 1.6, labs otherwise remarkable for downtrending Hgb 10->8. She was started on 1U PRBC and fluids and admitted for further work-up.              Overview/Hospital Course:  Misa Barajas is a 78 y.o. F admitted to hospital medicine for syncope and orthostatic hypotension. GI was consulted for symptomatic anemia. CBC was trended and remained stable after 1unit pRBC. There was no melena, hematochezia, hematemesis, or other signs of active bleeding. GI recommended PPI, but no endoscopy. Telemetry monitoring did not show any afib or flutter. EKG did reveal a bifascicular block. Cardiology curbsided, and bifascicular block felt very unlikely to be contributing to lightheadeness/syncope. Home antihypertensives were held. Infectious workup negative. She received additional 1L IVF on HD1 and HD2. Orthostatic vitals were monitored each day, and the degree of drop in SBP going from laying to sitting continued to improve. Notably, on echo her CVP was 3 mmHg, consistent with intravascular volume depletion. No signs of acute decompensated heart failure (SOB, leg swelling). She continued to feel lightheaded with going from laying to sitting. With ongoing fluid resuscitation and one dose of fludrocortisone, the lightheadedness with sitting/standing eventually resolved and SBP only dropped 4 mmHg going from laying to sitting. Dehydration and intravascular volume depletion is most likely etiology for orthostasis, in setting of recent hospitalization (ERCP and sphincterotomy for gallstone pancreatitis) and diuretic  use outpatient, and likely reduced PO intake. With ongoing fluid resuscitation, Hgb slowly downtrended. Continued to monitor CBC.     Interval History: Pt reports feeling well overall today. She was able to move from the bed to the chair last night and did not have any dizziness or lightheadedness. Also notes she sat up this morning for vitals and did not feel lightheaded. She has no systemic complaints at this time.      Objective:     Vital Signs (Most Recent):  Temp: 98.1 °F (36.7 °C) (08/29/24 0752)  Pulse: 98 (08/29/24 0752)  Resp: 20 (08/29/24 0752)  BP: 139/63 (08/29/24 0752)  SpO2: 97 % (08/29/24 0752) Vital Signs (24h Range):  Temp:  [97.5 °F (36.4 °C)-98.1 °F (36.7 °C)] 98.1 °F (36.7 °C)  Pulse:  [] 98  Resp:  [15-20] 20  SpO2:  [93 %-99 %] 97 %  BP: (114-151)/(56-91) 139/63     Weight: 77.1 kg (170 lb)  Body mass index is 29.18 kg/m².    Intake/Output Summary (Last 24 hours) at 8/29/2024 0819  Last data filed at 8/29/2024 0637  Gross per 24 hour   Intake 802 ml   Output 700 ml   Net 102 ml         Physical Exam  Vitals and nursing note reviewed.   Constitutional:       Appearance: Normal appearance.   HENT:      Head: Normocephalic and atraumatic.      Right Ear: External ear normal.      Left Ear: External ear normal.      Nose: Nose normal.      Mouth/Throat:      Mouth: Mucous membranes are moist.   Eyes:      Pupils: Pupils are equal, round, and reactive to light.   Cardiovascular:      Rate and Rhythm: Normal rate and regular rhythm.      Pulses: Normal pulses.   Pulmonary:      Effort: Pulmonary effort is normal.      Breath sounds: Normal breath sounds.   Abdominal:      Palpations: Abdomen is soft.   Musculoskeletal:      Cervical back: Normal range of motion.      Right lower leg: No edema.      Left lower leg: No edema.   Skin:     General: Skin is warm.   Neurological:      General: No focal deficit present.      Mental Status: She is alert and oriented to person, place, and time.    Psychiatric:         Mood and Affect: Mood normal.         Behavior: Behavior normal.         Thought Content: Thought content normal.         Judgment: Judgment normal.             Significant Labs: All pertinent labs within the past 24 hours have been reviewed.    Significant Imaging: I have reviewed all pertinent imaging results/findings within the past 24 hours.    Assessment/Plan:      * Syncope  Ms. Barajas is an 78 year old female with history as above who presents from her nursing home for syncopal episode while taking a shower at her nursing home. Per her daughter, patient has had periods of lightheadedness over the past 3 weeks, since her ERCP w/ sphincterotomy. States her blood pressure has been low in the SNF despite midodrine treatment. Patient states she had no pro-dromal or post syncopal symptoms. EKG w/ 1st degree AVB, unchanged from previous. She was not taking any BP meds as the nursing home was holding them. Differentials include orthostasis, cardiac related given history of aflutter s/p ablation, neurologic related, drug induced although less likely as nursing home was holding her blood pressure medications and she does not take buspar. Positive orthostatic vitals in the ED and again on 8/28 after receiving 2L volume (1L IVF and 1 unit pRBC). CT head negative for any acute intracranial abnormalities. Echo shows normal systolic function (EF 60-65%) and normal diastolic function. EKG shows a bifascicular block, however this is similar to EKG 1 year ago prior to the onset of dizziness/lightheadedness. Autonomic neuropathy on differential as a cause of orthostatic hypotension. Got additional 1L IVF and started on fludricortisone. Orthostatic vitals repeated: 131/91 to 126/60 (sitting). Patient no longer lightheaded sitting or standing. Unable to collect standing vitals due to leg weakness/deconditioning.      Plan:  - Continue to hold BP medications.  - continue telemetry  - can consider tilt table  testing w/ EP as outpatient if problem persists  - Continue Fludrocortisone   - 1L IVF again today, slow rate      Orthostatic hypotension  Persistent orthostasis as reported by daughter over the past 3 weeks. Unclear etiology, trial of holding BP meds and starting midodrine unsuccessful. Differentials include drug induced, hypovolemia (?lasix), bleeding, neurologic given prior polyneuropathy (less likely), hypothyroid (last TSH elevated to >4 in 2021), infection. S/p 1U PRBC. After 3L IVF and one dose Fludricortisone, pt no longer with lightheadedness on sitting or standing and orthostatic vitals no longer positive.     - see syncope  - Hold BP meds, consider restarting tomorrow     Acute blood loss anemia  Appears her baseline was 10-11 over the past couple of months and has down trended to 8.2 on admission. Per ED there was blood noted on ZAHEER and concern for oozing from prior sphincterotomy site done on 7/30 given she is also on Eliquis. She received 1U PRBC in the ED. Hgb has remained stable on repeat CBC. No additional blood noted. GI consulted and recommended PPI and continued CBC trending, but no plans for endoscopy. Per GI, ok to resume eliquis. Hgb was stable, then from dropped from 9.8 to 9 on 8/29. However pt did receive 1L fluid yesterday, could represent dilutional anemia as no blood in stool and no other obvious source of bleeding.    - PPI: protonix 40 mg BID  - Continue to monitor CBC  - continue Eliquis, will repeat CBC this afternoon and if significant drop will consider holding Eliquis    Typical atrial flutter  S/p ablation with EP. Has follow-up with Dr. Church in September. Currently on metoprolol and Eliquis.   [id number] Score: 6. EKG with 1st degree avb and rbbb seen on previous EKGs. No afib/flutter seen on telemetry. Eliquis initially held in the ED due to mild anemia, however no longer concerned for an acute bleed.    Plan  - monitor on telemetry  - Resumed home Eliquis 2.5 BID  - F/u w/  Dr. Church, EP, next month      Diastolic CHF, chronic  Noted history. On lasix 40 at home.  Echo 8/27: EF 60-65%, normal systolic function, normal diastolic function, PASP 34 mmHg, CVP 3. No overt signs of fluid overload on exam.    Plan:  - Hold furosemide      COPD (chronic obstructive pulmonary disease)  Patient's COPD is controlled currently.  Patient is currently off COPD Pathway. Continue scheduled inhalers and monitor respiratory status closely.     - On home 2L, no wheezing on my exam  - CXR on admission  - Breo with prn albuterol while inpatient    Chronic respiratory failure with hypoxia  Currently on 2L home oxygen for COPD. Saturating >96%.    Paroxysmal atrial fibrillation  Patient with Paroxysmal (<7 days) atrial fibrillation which is controlled currently with Beta Blocker. Patient is currently in sinus rhythm.[id number] Score: 6. HASBLED Score: 4. Anticoagulation not indicated due to bleed .    - See Atrial flutter    CKD (chronic kidney disease) stage 3, GFR 30-59 ml/min  Creatine stable for now. BMP reviewed- noted Estimated Creatinine Clearance: 41 mL/min (based on SCr of 1.1 mg/dL). according to latest data. Based on current GFR, CKD stage is stage 3 - GFR 30-59.  Monitor UOP and serial BMP and adjust therapy as needed. Renally dose meds. Avoid nephrotoxic medications and procedures.        ACP (advance care planning)  Patient made DNR in the ED. I confirmed with patient and her daughter about code status. They have prior paperwork signed, not in our system.        VTE Risk Mitigation (From admission, onward)           Ordered     apixaban tablet 5 mg  2 times daily         08/27/24 8984     Reason for No Pharmacological VTE Prophylaxis  Once        Question:  Reasons:  Answer:  Active Bleeding    08/26/24 2051     IP VTE HIGH RISK PATIENT  Once         08/26/24 2051     Place sequential compression device  Until discontinued         08/26/24 2033                    Discharge Planning   NOHEMI:  8/29/2024     Code Status: DNR   Is the patient medically ready for discharge?:     Reason for patient still in hospital (select all that apply): Patient trending condition  Discharge Plan A: Skilled Nursing Facility                  Lucero Post MD  Department of Hospital Medicine   [facility] - Cardiology Stepdown

## 2024-08-29 NOTE — ASSESSMENT & PLAN NOTE
Persistent orthostasis as reported by daughter over the past 3 weeks. Unclear etiology, trial of holding BP meds and starting midodrine unsuccessful. Differentials include drug induced, hypovolemia (?lasix), bleeding, neurologic given prior polyneuropathy (less likely), hypothyroid (last TSH elevated to >4 in 2021), infection. S/p 1U PRBC. After 3L IVF and one dose Fludricortisone, pt no longer with lightheadedness on sitting or standing and orthostatic vitals no longer positive.     - see syncope  - Hold BP meds, consider restarting tomorrow

## 2024-08-29 NOTE — PLAN OF CARE
AAOX4,VSS,O2 sats 97% on 2L NC, turned up to 3L for comfort.Plan of care discussed with patient. Patient refused PT today. Patient has no complaints of pain, dizziness, or lightheadedness. Discussed medications and care. Patients Hgb on downtrend. NS continuous infusion @100ml/hr initiated. Patient has no questions at this time.Pt visualised and stable, no acute distress noted.Call light within reach.Pt resting,bed at lowest position.Pt's daughter by the bedside.Plan of care ongoing.      Problem: Adult Inpatient Plan of Care  Goal: Plan of Care Review  Outcome: Progressing  Goal: Patient-Specific Goal (Individualized)  Outcome: Progressing  Goal: Absence of Hospital-Acquired Illness or Injury  Outcome: Progressing  Goal: Optimal Comfort and Wellbeing  Outcome: Progressing  Goal: Readiness for Transition of Care  Outcome: Progressing     Problem: Wound  Goal: Optimal Coping  Outcome: Progressing  Goal: Optimal Functional Ability  Outcome: Progressing  Goal: Absence of Infection Signs and Symptoms  Outcome: Progressing  Goal: Improved Oral Intake  Outcome: Progressing  Goal: Optimal Pain Control and Function  Outcome: Progressing  Goal: Skin Health and Integrity  Outcome: Progressing  Goal: Optimal Wound Healing  Outcome: Progressing     Problem: Skin Injury Risk Increased  Goal: Skin Health and Integrity  Outcome: Progressing     Problem: Fall Injury Risk  Goal: Absence of Fall and Fall-Related Injury  Outcome: Progressing

## 2024-08-29 NOTE — NURSING
Patient's Hemoglobin, decreased compared to yesterday. 9.8 to 8.9, Team notified. Inquired with MD about an occult stool test order and per SCAR Post MD Occult stool studies are not a good test for acute GI bleeds and are used for colon cancer screenings. No orders given at this time. Plan of care ongoing.

## 2024-08-29 NOTE — PLAN OF CARE
08/29/24 1423   Post-Acute Status   Post-Acute Authorization Placement   Post-Acute Placement Status Pending medical clearance/testing     Per PT Carmen she was not able to work with pt this morning as the pt became orthostatic while trying to get a standing weight and then pt declined working with PT this afternoon.  MARINE updated Kat at [facility] that pt is not yet medically stable to return, although Kat reported that they are pending auth.  MARINE will continue to follow.      Karey Castelan LMSW  [facility]  [id number]

## 2024-08-29 NOTE — ASSESSMENT & PLAN NOTE
Appears her baseline was 10-11 over the past couple of months and has down trended to 8.2 on admission. Per ED there was blood noted on ZAHEER and concern for oozing from prior sphincterotomy site done on 7/30 given she is also on Eliquis. She received 1U PRBC in the ED. Hgb has remained stable on repeat CBC. No additional blood noted. GI consulted and recommended PPI and continued CBC trending, but no plans for endoscopy. Per GI, ok to resume eliquis. Hgb was stable, then from dropped from 9.8 to 9 on 8/29. However pt did receive 1L fluid yesterday, could represent dilutional anemia as no blood in stool and no other obvious source of bleeding.    - PPI: protonix 40 mg BID  - Continue to monitor CBC  - continue Eliquis, will repeat CBC this afternoon and if significant drop will consider holding Eliquis

## 2024-08-29 NOTE — PLAN OF CARE
Plan of care reviewed with patient.   Advised to call for assistance at all times, call bell within reach.   No complaints of pain or any discomfort during shift.    Problem: Adult Inpatient Plan of Care  Goal: Plan of Care Review  Outcome: Progressing  Goal: Patient-Specific Goal (Individualized)  Outcome: Progressing  Goal: Absence of Hospital-Acquired Illness or Injury  Outcome: Progressing  Goal: Optimal Comfort and Wellbeing  Outcome: Progressing  Goal: Readiness for Transition of Care  Outcome: Progressing     Problem: Wound  Goal: Optimal Coping  Outcome: Progressing  Goal: Optimal Functional Ability  Outcome: Progressing  Goal: Absence of Infection Signs and Symptoms  Outcome: Progressing  Goal: Improved Oral Intake  Outcome: Progressing  Goal: Optimal Pain Control and Function  Outcome: Progressing  Goal: Skin Health and Integrity  Outcome: Progressing  Goal: Optimal Wound Healing  Outcome: Progressing     Problem: Skin Injury Risk Increased  Goal: Skin Health and Integrity  Outcome: Progressing     Problem: Fall Injury Risk  Goal: Absence of Fall and Fall-Related Injury  Outcome: Progressing

## 2024-08-29 NOTE — NURSING
Orthostatics (lying to sitting) done by night shift. Sitting to standing VS requested by MD. Standing VS attempted, patient unable to stand long enough to obtain standing VS, patient becomes weak, SOB, and knees began to buckle. Lightheadedness and dizziness denied.Team notified. Per MAT Perdomo MD administer NS @ 100ml, no further orders given at this time, plan of care ongoing.

## 2024-08-29 NOTE — SUBJECTIVE & OBJECTIVE
Interval History: Pt reports feeling well overall today. She was able to move from the bed to the chair last night and did not have any dizziness or lightheadedness. Also notes she sat up this morning for vitals and did not feel lightheaded. She has no systemic complaints at this time.      Objective:     Vital Signs (Most Recent):  Temp: 98.1 °F (36.7 °C) (08/29/24 0752)  Pulse: 98 (08/29/24 0752)  Resp: 20 (08/29/24 0752)  BP: 139/63 (08/29/24 0752)  SpO2: 97 % (08/29/24 0752) Vital Signs (24h Range):  Temp:  [97.5 °F (36.4 °C)-98.1 °F (36.7 °C)] 98.1 °F (36.7 °C)  Pulse:  [] 98  Resp:  [15-20] 20  SpO2:  [93 %-99 %] 97 %  BP: (114-151)/(56-91) 139/63     Weight: 77.1 kg (170 lb)  Body mass index is 29.18 kg/m².    Intake/Output Summary (Last 24 hours) at 8/29/2024 0819  Last data filed at 8/29/2024 0637  Gross per 24 hour   Intake 802 ml   Output 700 ml   Net 102 ml         Physical Exam  Vitals and nursing note reviewed.   Constitutional:       Appearance: Normal appearance.   HENT:      Head: Normocephalic and atraumatic.      Right Ear: External ear normal.      Left Ear: External ear normal.      Nose: Nose normal.      Mouth/Throat:      Mouth: Mucous membranes are moist.   Eyes:      Pupils: Pupils are equal, round, and reactive to light.   Cardiovascular:      Rate and Rhythm: Normal rate and regular rhythm.      Pulses: Normal pulses.   Pulmonary:      Effort: Pulmonary effort is normal.      Breath sounds: Normal breath sounds.   Abdominal:      Palpations: Abdomen is soft.   Musculoskeletal:      Cervical back: Normal range of motion.      Right lower leg: No edema.      Left lower leg: No edema.   Skin:     General: Skin is warm.   Neurological:      General: No focal deficit present.      Mental Status: She is alert and oriented to person, place, and time.   Psychiatric:         Mood and Affect: Mood normal.         Behavior: Behavior normal.         Thought Content: Thought content normal.          Judgment: Judgment normal.             Significant Labs: All pertinent labs within the past 24 hours have been reviewed.    Significant Imaging: I have reviewed all pertinent imaging results/findings within the past 24 hours.

## 2024-08-29 NOTE — NURSING
Spoke with patient's daughter this a.m. per phone conversation. Daughter requested an Occult stool study, team notified. Per MAT Perdomo MD they were instructed by G.I. not to perform the occult stool study and her Hgb is not dropping fast enough to warrant immediate action and if it came back positive it wouldn't change anything. Ordered by MD to inform family member. No further orders given at this time, plan of care ongoing.

## 2024-08-29 NOTE — ASSESSMENT & PLAN NOTE
Ms. Barajas is an 78 year old female with history as above who presents from her nursing home for syncopal episode while taking a shower at her nursing home. Per her daughter, patient has had periods of lightheadedness over the past 3 weeks, since her ERCP w/ sphincterotomy. States her blood pressure has been low in the SNF despite midodrine treatment. Patient states she had no pro-dromal or post syncopal symptoms. EKG w/ 1st degree AVB, unchanged from previous. She was not taking any BP meds as the nursing home was holding them. Differentials include orthostasis, cardiac related given history of aflutter s/p ablation, neurologic related, drug induced although less likely as nursing home was holding her blood pressure medications and she does not take buspar. Positive orthostatic vitals in the ED and again on 8/28 after receiving 2L volume (1L IVF and 1 unit pRBC). CT head negative for any acute intracranial abnormalities. Echo shows normal systolic function (EF 60-65%) and normal diastolic function. EKG shows a bifascicular block, however this is similar to EKG 1 year ago prior to the onset of dizziness/lightheadedness. Autonomic neuropathy on differential as a cause of orthostatic hypotension. Got additional 1L IVF and started on fludricortisone. Orthostatic vitals repeated: 131/91 to 126/60 (sitting). Patient no longer lightheaded sitting or standing. Unable to collect standing vitals due to leg weakness/deconditioning.      Plan:  - Continue to hold BP medications.  - continue telemetry  - can consider tilt table testing w/ EP as outpatient if problem persists  - Continue Fludrocortisone   - 1L IVF again today, slow rate

## 2024-08-29 NOTE — NURSING
Increasing SOB noted with patient on 2L nasal cannula (home baseline). O2 sats on 2L 97%. O2 increased to 3L for comfort. Team notified and CXR suggested to confirm no pulmonary edema for administering any additional fluids. Per EZEKIEL Wood MD increase the HOB and turn up O2, patient does not look overloaded right now and CXR deferred. No further orders given at this time, plan of care ongoing.

## 2024-08-30 LAB
ALBUMIN SERPL BCP-MCNC: 1.9 G/DL (ref 3.5–5.2)
ALP SERPL-CCNC: 109 U/L (ref 55–135)
ALT SERPL W/O P-5'-P-CCNC: 20 U/L (ref 10–44)
ANION GAP SERPL CALC-SCNC: 8 MMOL/L (ref 8–16)
AST SERPL-CCNC: 31 U/L (ref 10–40)
BASOPHILS # BLD AUTO: 0.04 K/UL (ref 0–0.2)
BASOPHILS NFR BLD: 0.5 % (ref 0–1.9)
BILIRUB SERPL-MCNC: 1.1 MG/DL (ref 0.1–1)
BUN SERPL-MCNC: 14 MG/DL (ref 8–23)
CALCIUM SERPL-MCNC: 9.1 MG/DL (ref 8.7–10.5)
CHLORIDE SERPL-SCNC: 115 MMOL/L (ref 95–110)
CO2 SERPL-SCNC: 22 MMOL/L (ref 23–29)
CREAT SERPL-MCNC: 1.1 MG/DL (ref 0.5–1.4)
DIFFERENTIAL METHOD BLD: ABNORMAL
EOSINOPHIL # BLD AUTO: 0.3 K/UL (ref 0–0.5)
EOSINOPHIL NFR BLD: 3.6 % (ref 0–8)
ERYTHROCYTE [DISTWIDTH] IN BLOOD BY AUTOMATED COUNT: 19 % (ref 11.5–14.5)
EST. GFR  (NO RACE VARIABLE): 50.8 ML/MIN/1.73 M^2
GLUCOSE SERPL-MCNC: 91 MG/DL (ref 70–110)
HCT VFR BLD AUTO: 28 % (ref 37–48.5)
HGB BLD-MCNC: 8.7 G/DL (ref 12–16)
IMM GRANULOCYTES # BLD AUTO: 0.02 K/UL (ref 0–0.04)
IMM GRANULOCYTES NFR BLD AUTO: 0.2 % (ref 0–0.5)
LYMPHOCYTES # BLD AUTO: 1.6 K/UL (ref 1–4.8)
LYMPHOCYTES NFR BLD: 19.5 % (ref 18–48)
MAGNESIUM SERPL-MCNC: 1.8 MG/DL (ref 1.6–2.6)
MCH RBC QN AUTO: 29.2 PG (ref 27–31)
MCHC RBC AUTO-ENTMCNC: 31.1 G/DL (ref 32–36)
MCV RBC AUTO: 94 FL (ref 82–98)
MONOCYTES # BLD AUTO: 1 K/UL (ref 0.3–1)
MONOCYTES NFR BLD: 12 % (ref 4–15)
NEUTROPHILS # BLD AUTO: 5.2 K/UL (ref 1.8–7.7)
NEUTROPHILS NFR BLD: 64.2 % (ref 38–73)
NRBC BLD-RTO: 0 /100 WBC
OHS QRS DURATION: 144 MS
OHS QTC CALCULATION: 468 MS
PHOSPHATE SERPL-MCNC: 3.2 MG/DL (ref 2.7–4.5)
PLATELET # BLD AUTO: 243 K/UL (ref 150–450)
PMV BLD AUTO: 10 FL (ref 9.2–12.9)
POTASSIUM SERPL-SCNC: 3.5 MMOL/L (ref 3.5–5.1)
PROT SERPL-MCNC: 4.9 G/DL (ref 6–8.4)
RBC # BLD AUTO: 2.98 M/UL (ref 4–5.4)
SODIUM SERPL-SCNC: 145 MMOL/L (ref 136–145)
WBC # BLD AUTO: 8.16 K/UL (ref 3.9–12.7)

## 2024-08-30 PROCEDURE — 97530 THERAPEUTIC ACTIVITIES: CPT

## 2024-08-30 PROCEDURE — 85025 COMPLETE CBC W/AUTO DIFF WBC: CPT | Performed by: STUDENT IN AN ORGANIZED HEALTH CARE EDUCATION/TRAINING PROGRAM

## 2024-08-30 PROCEDURE — 83735 ASSAY OF MAGNESIUM: CPT | Performed by: STUDENT IN AN ORGANIZED HEALTH CARE EDUCATION/TRAINING PROGRAM

## 2024-08-30 PROCEDURE — 36415 COLL VENOUS BLD VENIPUNCTURE: CPT | Performed by: STUDENT IN AN ORGANIZED HEALTH CARE EDUCATION/TRAINING PROGRAM

## 2024-08-30 PROCEDURE — 20600001 HC STEP DOWN PRIVATE ROOM

## 2024-08-30 PROCEDURE — 25000003 PHARM REV CODE 250

## 2024-08-30 PROCEDURE — 94640 AIRWAY INHALATION TREATMENT: CPT

## 2024-08-30 PROCEDURE — 25000003 PHARM REV CODE 250: Performed by: INTERNAL MEDICINE

## 2024-08-30 PROCEDURE — 97161 PT EVAL LOW COMPLEX 20 MIN: CPT

## 2024-08-30 PROCEDURE — 27000221 HC OXYGEN, UP TO 24 HOURS

## 2024-08-30 PROCEDURE — 80053 COMPREHEN METABOLIC PANEL: CPT | Performed by: STUDENT IN AN ORGANIZED HEALTH CARE EDUCATION/TRAINING PROGRAM

## 2024-08-30 PROCEDURE — 99900035 HC TECH TIME PER 15 MIN (STAT)

## 2024-08-30 PROCEDURE — 84100 ASSAY OF PHOSPHORUS: CPT | Performed by: STUDENT IN AN ORGANIZED HEALTH CARE EDUCATION/TRAINING PROGRAM

## 2024-08-30 PROCEDURE — 97535 SELF CARE MNGMENT TRAINING: CPT

## 2024-08-30 RX ORDER — LANOLIN ALCOHOL/MO/W.PET/CERES
800 CREAM (GRAM) TOPICAL EVERY 4 HOURS
Status: COMPLETED | OUTPATIENT
Start: 2024-08-30 | End: 2024-08-30

## 2024-08-30 RX ORDER — METOPROLOL SUCCINATE 50 MG/1
50 TABLET, EXTENDED RELEASE ORAL DAILY
Status: DISCONTINUED | OUTPATIENT
Start: 2024-08-31 | End: 2024-09-03 | Stop reason: HOSPADM

## 2024-08-30 RX ORDER — METOPROLOL SUCCINATE 100 MG/1
100 TABLET, EXTENDED RELEASE ORAL DAILY
Status: DISCONTINUED | OUTPATIENT
Start: 2024-08-30 | End: 2024-08-30

## 2024-08-30 RX ADMIN — FLUTICASONE FUROATE AND VILANTEROL TRIFENATATE 1 PUFF: 100; 25 POWDER RESPIRATORY (INHALATION) at 10:08

## 2024-08-30 RX ADMIN — METOPROLOL SUCCINATE 100 MG: 100 TABLET, EXTENDED RELEASE ORAL at 08:08

## 2024-08-30 RX ADMIN — APIXABAN 5 MG: 5 TABLET, FILM COATED ORAL at 09:08

## 2024-08-30 RX ADMIN — Medication 800 MG: at 09:08

## 2024-08-30 RX ADMIN — APIXABAN 5 MG: 5 TABLET, FILM COATED ORAL at 08:08

## 2024-08-30 RX ADMIN — PANTOPRAZOLE SODIUM 40 MG: 40 TABLET, DELAYED RELEASE ORAL at 06:08

## 2024-08-30 RX ADMIN — PANTOPRAZOLE SODIUM 40 MG: 40 TABLET, DELAYED RELEASE ORAL at 03:08

## 2024-08-30 RX ADMIN — Medication 800 MG: at 03:08

## 2024-08-30 RX ADMIN — FLUDROCORTISONE ACETATE 100 MCG: 0.1 TABLET ORAL at 08:08

## 2024-08-30 RX ADMIN — POTASSIUM BICARBONATE 40 MEQ: 391 TABLET, EFFERVESCENT ORAL at 09:08

## 2024-08-30 RX ADMIN — TIOTROPIUM BROMIDE INHALATION SPRAY 2 PUFF: 3.12 SPRAY, METERED RESPIRATORY (INHALATION) at 10:08

## 2024-08-30 NOTE — ASSESSMENT & PLAN NOTE
Appears her baseline was 10-11 over the past couple of months and has down trended to 8.2 on admission. Per ED there was blood noted on ZAHEER and concern for oozing from prior sphincterotomy site done on 7/30 given she is also on Eliquis. She received 1U PRBC in the ED. Hgb has remained stable on repeat CBC. No additional blood noted. GI consulted and recommended PPI and continued CBC trending, but no plans for endoscopy. Per GI, ok to resume eliquis. Hgb has remained overall stable. There have been slight drops (9.8 > 9 > 8.9) following several liters of IVF rehydration with associated drops in WBC and platelet count, representative of hemodilution. Additionally, no signs of active bleeding.     - PPI: protonix 40 mg BID  - Continue to monitor CBC

## 2024-08-30 NOTE — PROGRESS NOTES
"[facility] - Cardiology [facility] Medicine  Progress Note    Patient Name: Misa Barajas  MRN: [id number]  Patient Class: IP- Inpatient   Admission Date: 2024  Length of Stay: 3 days  Attending Physician: Talha Perdomo MD  Primary Care Provider: Celia Carlisle MD (Inactive)        Subjective:     Principal Problem:Orthostatic hypotension        HPI:  Ms. Barajas is a 78 year old female with a history of COPD chronically on 2L, CKD, HFpEF, remote hx of breast cx s/p chemo c/b polyneuropathy, paroxysmal Afib/flutter who presents from [facility] for new onset dizziness that started around 7am this morning. Patient states she was taking a shower with her nursing aid this morning when she acutely began feeling like she was about to pass out. She denies that the room was spinning but instead endorses lightheadedness. She states she may have passed out for 1-2 minutes as per her nursing aid and remembers being held up by the nursing home staff. She denies any chest pain, palpitations, dyspnea, or any other symptoms prior to becoming dizzy. She denies any post-syncopal symptoms as well. She currently states she feels fine but may become "dizzy" again if she were to stand up. She also denies any new symptoms including rashes, wounds, dysuria, hematuria, hematemesis, abdominal pain, fever, chills. She is unsure if she has melena as nursing home staff changes her diapers. She states this has never happened in the past.     She has had multiple admissions over the last couple of months includin/7- HF exacerbation, AIN, E.faecalis UTI.   - admission for cellulitis and pastruella bacteremia s/p 2 week treatment w/ unaysyn and doxycline.  - admission for gallstone pancreatitis w/ possible early cholecystitis s/p ERCP w/ sphincterotomy on .     I spoke with the patient's daughter over the phone Marina. She states the patient began having symptoms of dizziness around 3 weeks " ago. She noticed it began after her ERCP w/ sphincterotomy. While in rehab the daughter states patient has been persistently orthostatic with low blood pressures and dizziness with standing. The SNF attempted to use increasing doses midodrine and meclizine however she continued to be dizzy and orthostatic.     In the ED patient was noted to be orthostatic with /62 HR 82 when lying and  w/ BP 65/35 when standing. Lactic acid was 1.6, labs otherwise remarkable for downtrending Hgb 10->8. She was started on 1U PRBC and fluids and admitted for further work-up.              Overview/Hospital Course:  Misa Barajas is a 78 y.o. F admitted to hospital medicine for syncope and orthostatic hypotension. GI was consulted for symptomatic anemia. CBC was trended and remained stable after 1unit pRBC. There was no melena, hematochezia, hematemesis, or other signs of active bleeding. GI recommended PPI, but no endoscopy. Telemetry monitoring did not show any afib or flutter. EKG did reveal a bifascicular block. Cardiology curbsided, and bifascicular block felt very unlikely to be contributing to lightheadeness/syncope. Home antihypertensives were held. Infectious workup negative. She received additional 1L IVF on HD1 and HD2. Orthostatic vitals were monitored each day, and the degree of drop in SBP going from laying to sitting continued to improve. Notably, on echo her CVP was 3 mmHg, consistent with intravascular volume depletion. No signs of acute decompensated heart failure (SOB, leg swelling). She continued to feel lightheaded with going from laying to sitting. With ongoing fluid resuscitation and one dose of fludrocortisone, the lightheadedness with sitting/standing eventually resolved and SBP only dropped 4 mmHg going from laying to sitting. Dehydration and intravascular volume depletion is most likely etiology for orthostasis, in setting of recent hospitalization (ERCP and sphincterotomy for gallstone  pancreatitis) and diuretic use outpatient, and likely reduced PO intake. With ongoing fluid resuscitation, Hgb slowly downtrended along with WBC and platelet count. Consistent with hemodilution. No signs of bleeding. Continued to monitor CBC. Fludricortisone was discontinued and began to reintroduce home meds, starting with Metoprolol.    Interval History: No acute events overnight. Pt was tachycardic to the 120's overnight. She was asymptomatic. Telemetry reviewed and EKG ordered. Sinus rhythm on both with no evidence of Afib or flutter. She has no systemic complaints at this time. Specifically denies SOB, chest pain, abdominal pain, or palpitations.       Objective:     Vital Signs (Most Recent):  Temp: 98.4 °F (36.9 °C) (08/30/24 0753)  Pulse: 108 (08/30/24 0753)  Resp: (!) 21 (08/30/24 0753)  BP: 124/61 (08/30/24 0753)  SpO2: 95 % (08/30/24 0753) Vital Signs (24h Range):  Temp:  [97.7 °F (36.5 °C)-98.5 °F (36.9 °C)] 98.4 °F (36.9 °C)  Pulse:  [] 108  Resp:  [17-21] 21  SpO2:  [94 %-97 %] 95 %  BP: (104-152)/(57-72) 124/61     Weight: 70.5 kg (155 lb 6.8 oz)  Body mass index is 26.68 kg/m².    Intake/Output Summary (Last 24 hours) at 8/30/2024 0931  Last data filed at 8/30/2024 0823  Gross per 24 hour   Intake 1686.1 ml   Output 600 ml   Net 1086.1 ml         Physical Exam  Vitals and nursing note reviewed.   Constitutional:       Appearance: Normal appearance.   HENT:      Head: Normocephalic and atraumatic.      Right Ear: External ear normal.      Left Ear: External ear normal.      Nose: Nose normal.      Mouth/Throat:      Mouth: Mucous membranes are moist.   Eyes:      Extraocular Movements: Extraocular movements intact.   Cardiovascular:      Rate and Rhythm: Normal rate and regular rhythm.      Pulses: Normal pulses.   Pulmonary:      Effort: Pulmonary effort is normal.      Breath sounds: Normal breath sounds.   Abdominal:      Palpations: Abdomen is soft.   Musculoskeletal:      Cervical back:  Normal range of motion.      Right lower leg: No edema.      Left lower leg: No edema.   Skin:     General: Skin is warm.   Neurological:      General: No focal deficit present.      Mental Status: She is alert and oriented to person, place, and time.   Psychiatric:         Mood and Affect: Mood normal.         Behavior: Behavior normal.         Thought Content: Thought content normal.         Judgment: Judgment normal.             Significant Labs: All pertinent labs within the past 24 hours have been reviewed.    Significant Imaging: I have reviewed all pertinent imaging results/findings within the past 24 hours.    Assessment/Plan:      * Orthostatic hypotension  Ms. Barajas is an 78 year old female with history as above who presents from her nursing home for syncopal episode while taking a shower at her nursing home. Per her daughter, patient has had periods of lightheadedness over the past 3 weeks, since her ERCP w/ sphincterotomy. States her blood pressure has been low in the SNF despite midodrine treatment. Patient states she had no pro-dromal or post syncopal symptoms. EKG w/ 1st degree AVB, unchanged from previous. She was not taking any BP meds as the nursing home was holding them. Differentials include orthostasis, cardiac related given history of aflutter s/p ablation, neurologic related, drug induced although less likely as nursing home was holding her blood pressure medications and she does not take buspar. Positive orthostatic vitals in the ED and again on 8/28 after receiving 2L volume (1L IVF and 1 unit pRBC). CT head negative for any acute intracranial abnormalities. Echo shows normal systolic function (EF 60-65%) and normal diastolic function. EKG shows a bifascicular block, however this is similar to EKG 1 year ago prior to the onset of dizziness/lightheadedness. Autonomic neuropathy on differential as a cause of orthostatic hypotension. Got additional 1L IVF and started on fludricortisone.  Orthostatic vitals repeated: 131/91 to 126/60 (sitting). Patient no longer lightheaded sitting or standing. Unable to collect standing vitals due to leg weakness/deconditioning.    Per daughter, and confirmed with chart check, patient had orthostatic hypotension in May of this year. Losartan was held and she did not have any dizziness in May, June, or July. During recent admission in July for pancreatitis, Losartan was restarted due to hypertension (this was also in setting of pain) and since that time the dizziness and orthostasis have returned.     Plan:  - Continue to hold BP medications.  - continue telemetry  - can consider tilt table testing w/ EP as outpatient if problem persists  - Stop Fludrocortisone tomorrow        Acute blood loss anemia  Appears her baseline was 10-11 over the past couple of months and has down trended to 8.2 on admission. Per ED there was blood noted on ZAHEER and concern for oozing from prior sphincterotomy site done on 7/30 given she is also on Eliquis. She received 1U PRBC in the ED. Hgb has remained stable on repeat CBC. No additional blood noted. GI consulted and recommended PPI and continued CBC trending, but no plans for endoscopy. Per GI, ok to resume eliquis. Hgb has remained overall stable. There have been slight drops (9.8 > 9 > 8.9) following several liters of IVF rehydration with associated drops in WBC and platelet count, representative of hemodilution. Additionally, no signs of active bleeding.     - PPI: protonix 40 mg BID  - Continue to monitor CBC    Hypercoagulability due to atrial fibrillation  S/p ablation with EP. Has follow-up with Dr. Church in September. At that time, Dr. Church will determine whether she needs to be on Eliquis. Currently on metoprolol and Eliquis.   [id number] Score: 6. EKG with 1st degree avb and rbbb seen on previous EKGs. No afib/flutter seen on telemetry. Eliquis initially held in the ED due to mild anemia, however no longer concerned for  an acute bleed. No Afib or flutter on EKG or telemetry throughout this hospital stay.    Plan  - monitor on telemetry  - Resumed home Eliquis 2.5 BID  - F/u w/ Dr. Chruch EP, next month      Diastolic CHF, chronic  Noted history. On lasix 40 at home.  Echo 8/27: EF 60-65%, normal systolic function, normal diastolic function, PASP 34 mmHg, CVP 3. No overt signs of fluid overload on exam.    Plan:  - assess volume status daily  - Hold furosemide      COPD (chronic obstructive pulmonary disease)  Patient's COPD is controlled currently.  Patient is currently off COPD Pathway. Continue scheduled inhalers and monitor respiratory status closely.     - On home 2L, no wheezing on my exam  - CXR on admission  - Breo with prn albuterol while inpatient    Chronic respiratory failure with hypoxia  Currently on 2L home oxygen for COPD. Saturating >96%.    CKD (chronic kidney disease) stage 3, GFR 30-59 ml/min  Creatine stable for now. BMP reviewed- noted Estimated Creatinine Clearance: 41 mL/min (based on SCr of 1.1 mg/dL). according to latest data. Based on current GFR, CKD stage is stage 3 - GFR 30-59.  Monitor UOP and serial BMP and adjust therapy as needed. Renally dose meds. Avoid nephrotoxic medications and procedures.        ACP (advance care planning)  Patient made DNR in the ED. I confirmed with patient and her daughter about code status. They have prior paperwork signed, not in our system.      HTN (hypertension)  Home regimen: Losartan 50 mg qd, Metoprolol succinate. According to pt's daughter, she was off of both medications in May, June, and July and felt well. Once these medications were re-started during recent hospitalization, she began to feel lightheaded and dizzy. Consider stopping anti-hypertensive medications due to symptomatic orthostatic hypotension.     Plan  - Holding Losartan in setting of orthostatic hypotension  - Resume Metoprolol Succinate 100 mg qd today. Will decrease dose tomorrow to 50 mg.          VTE Risk Mitigation (From admission, onward)           Ordered     apixaban tablet 5 mg  2 times daily         08/27/24 1454     Reason for No Pharmacological VTE Prophylaxis  Once        Question:  Reasons:  Answer:  Active Bleeding    08/26/24 2051     IP VTE HIGH RISK PATIENT  Once         08/26/24 2051     Place sequential compression device  Until discontinued         08/26/24 2033                    Discharge Planning   NOHEMI: 9/2/2024     Code Status: DNR   Is the patient medically ready for discharge?:     Reason for patient still in hospital (select all that apply): Patient trending condition  Discharge Plan A: Skilled Nursing Facility                  Lucero Post MD  Department of Hospital Medicine   [facility] - Cardiology Stepdown

## 2024-08-30 NOTE — PLAN OF CARE
Per Kat in admissions at [facility] they have auth and a bed available for pt today, but they cannot hold that bed and she does not know when the next bed will be available.  SW relayed this to provider team.      Karey Castelan, ALEXIS  [facility]  [phone]

## 2024-08-30 NOTE — NURSING
Patient stands very briefly with assistance x1 before become SOB and knees beginning to buckle and legs give out. Patient refused PT on yesterday.

## 2024-08-30 NOTE — PT/OT/SLP PROGRESS
Occupational Therapy   Co-Treatment  Co-treatment performed due to patient's multiple deficits requiring two skilled therapists to appropriately and safely assess patient's strength and endurance while facilitating functional tasks in addition to accommodating for patient's activity tolerance.      Name: Misa Barajas  MRN: [id number]  Admitting Diagnosis:  Orthostatic hypotension       Recommendations:     Discharge Recommendations: Moderate Intensity Therapy  Discharge Equipment Recommendations:  walker, rolling  Barriers to discharge:  Decreased caregiver support    Assessment:     Misa Barajas is a 78 y.o. female with a medical diagnosis of Orthostatic hypotension.  She presents with the following performance deficits affecting function are weakness, impaired endurance, impaired self care skills, impaired functional mobility, gait instability, impaired balance, decreased coordination, impaired cardiopulmonary response to activity. Pt tolerated session fairly well with good participation. After transferring EOB>BSC, BP dropped >20 systolic after ~3 minutes seated. Pt returned to supine and in trendelenburg with BP increasing ~1 minute. BP readings listed below. Pt would continue to benefit from skilled OT services to maximize functional independence with ADLs and functional mobility, reduce caregiver burden, and facilitate safe discharge in the least restrictive environment.        Vitals monitored during Session; pt asymptomatic t/o  Orthostatic BP:  BP Position   134/65 supine   124/64 Seated EOB ~1 minute   86/51 Seated on BSC   114/56 supine         Rehab Prognosis:  Good; patient would benefit from acute skilled OT services to address these deficits and reach maximum level of function.       Plan:     Patient to be seen 4 x/week to address the above listed problems via self-care/home management, therapeutic activities, therapeutic exercises, neuromuscular re-education  Plan of Care Expires:  "09/28/24  Plan of Care Reviewed with: patient    Subjective     Chief Complaint: "feeling woozy" initially at EOB  Patient/Family Comments/goals: to get better  Pain/Comfort:  Pain Rating 1: 0/10  Pain Rating Post-Intervention 1: 0/10    Objective:   Additional staff present:  Carmen PT    Communicated with: RN prior to session.  Patient found HOB elevated with peripheral IV, PureWick, telemetry, oxygen upon OT entry to room.    General Precautions: Standard, fall    Orthopedic Precautions:N/A  Braces: N/A  Respiratory Status: Nasal cannula, flow 2 L/min     Occupational Performance:     Bed Mobility:    Patient completed Rolling/Turning to Left with  minimum assistance  Patient completed Rolling/Turning to Right with minimum assistance  Patient completed Scooting/Bridging with maximal assistance  Patient completed Supine to Sit with minimum assistance  Patient completed Sit to Supine with minimum assistance     Functional Mobility/Transfers:  Patient completed Sit <> Stand Transfer with moderate assistance  with  no assistive device   Patient completed Toilet Transfer Stand Pivot technique with moderate assistance with  no AD  No LOB or SOB noted  Verbal cues for initiation and sequencing    Activities of Daily Living:  Upper Body Dressing: moderate assistance required to don hospital gown in supine  Toileting: total assistance to remove soiled purewick + mesh in standing from BSC and for pericare at bed level 2/2 orthostatic hypotension      [facility] 6 Click ADL: 16    Treatment & Education:  -Education on energy conservation and task modification to maximize safety and (I) during ADLs and mobility  -Education on importance of OOB activity to improve overall activity tolerance and promote recovery  -Pt educated to call for assistance and to transfer with hospital staff only  -Provided education regarding role of OT, POC, & discharge recommendations with pt verbalizing understanding.  Pt had no further questions & " when asked whether there were any concerns pt reported none.     Patient left HOB elevated with all lines intact, call button in reach, and RN notified    GOALS:   Multidisciplinary Problems       Occupational Therapy Goals          Problem: Occupational Therapy    Goal Priority Disciplines Outcome Interventions   Occupational Therapy Goal     OT, PT/OT Progressing    Description: Goals to be met by: 9/28/24     Patient will increase functional independence with ADLs by performing:    UE Dressing with Modified Phelps and Assistive Devices as needed.  LE Dressing with Modified Phelps and Assistive Devices as needed.  Grooming while standing at sink with Modified Phelps.  Toileting from toilet with Modified Phelps for hygiene and clothing management.   Bathing from  shower chair/bench with Modified Phelps.  Toilet transfer to toilet with Modified Phelps.  Increased functional strength to WFL for B UE.  Upper extremity exercise program x15 reps per handout, with assistance as needed.                         Time Tracking:     OT Date of Treatment: 08/30/24  OT Start Time: 0914  OT Stop Time: 0940  OT Total Time (min): 26 min    Billable Minutes:Self Care/Home Management 16  Therapeutic Activity 10    OT/MARYA: OT          8/30/2024

## 2024-08-30 NOTE — PLAN OF CARE
IVF completed per order. VSS on 2L NC sat >92%. Was NSR but HR went up to 120s, BP 140s, asymx. MD notified, EKG showed sinus tach. No new order, WCTM. Later -120, BP 140s, pt denies any discomfort/pain/SOB. Purewick in place, urine concentrated/nay colored. Pt repositioned in bed, cream applied to sacrum for unblanchable redness, photo uploaded    Problem: Adult Inpatient Plan of Care  Goal: Absence of Hospital-Acquired Illness or Injury  Outcome: Progressing     Problem: Skin Injury Risk Increased  Goal: Skin Health and Integrity  Outcome: Progressing     Problem: Fall Injury Risk  Goal: Absence of Fall and Fall-Related Injury  Outcome: Progressing

## 2024-08-30 NOTE — PT/OT/SLP EVAL
Physical Therapy Evaluation and Co-Treatment     Patient Name:  Misa Barajas  MRN: [id number]    Admit Date: 8/26/2024  Admitting Diagnosis:  Orthostatic hypotension  Length of Stay: 3 days  Recent Surgery: * No surgery found *      Recommendations:     Discharge Recommendations: moderate intensity therapy  Equipment recommendations: rolling walker  Barriers to discharge: Increased level of skilled assistance required and Fall risk     Transfer to recliner daily with nsg per progressive mobility protocol - x1 person assist via stand pivot   Assessment:     Misa Barajas is a 78 y.o. female admitted to [facility] [place]24 with medical diagnosis of Orthostatic hypotension. Pt presents with weakness, impaired endurance, impaired self care skills, impaired functional mobility, gait instability, impaired balance, decreased coordination, impaired cardiopulmonary response to activity. These deficits effect their roles and responsibilities in which they were able to complete prior to admit.     Pt is agreeable to therapy evaluation and session. Pt arrives from SNF where she was experiencing syncopal events, likely related to orthostatic hypotension she has been experiencing during current admission. Nsg states BP meds have been given this AM prior to session. Pt was ambulating with rollator with assist at SNF. BP measurements in objective section. Requires light assist for bed mobility. After MMT eob, pt states she is having a BM. Assist with STS and transfer to recliner. After BM, assist provided for transfer back to bed. Reports feeling symptomatic after assisting back to supine. Performed rolling for pericare. HOB elevated at end of session to assist with BP management. Will continue to assess mobility as tolerated.    Misa Barajas would benefit from acute PT intervention to improve quality of life, focus on recovery of impairments, provide patient/caregiver education, reduce fall risk, and maximize (I) and safety  with functional mobility. Once medically stable, recommending pt discharge to moderate intensity therapy. Patient currently demonstrates a need for moderate intensity therapy on a daily basis post acute secondary to a decline in functional status due to illness .      Rehab Prognosis: Good    Plan:     During this hospitalization, patient to be seen 4 x/week to address the identified rehab impairments via gait training, therapeutic activities, therapeutic exercises, neuromuscular re-education and progress towards stated goals.     Plan of Care Expires:  09/29/24  Plan of Care reviewed with: patient    This plan of care has been discussed with the patient/caregiver, who was included in its development and is in agreement with the identified goals and treatment plan.     Subjective     Communicated with RN prior to session.  Patient found HOB elevated upon PT entry to room, agreeable to evaluation. Pt alone during session.    Chief Complaint: none stated    Patient/Family Comments/goals: none stated    Pain/Comfort:  Pain Rating 1: 0/10  Pain Rating Post-Intervention 1: 0/10    Patients cultural, spiritual, Mu-ism conflicts given the current situation: None identified     Patient History: information obtained from pt     Living Environment: Pt lives alone in single level home  with threshold ALKA. Bathroom set-up: walk-in shower  Prior Level of Function: independent with mobility and ADLs prior to SNF admission  DME owned:  rollator, shower chair, bedside commode, grab bar, 2 L oxygen  Support Available/Caregiver Assistance:  unclear    Objective:   OT present for cotreat due to pt's multiple medical comorbidities and functional/cognition deficits requiring two skilled therapists to appropriately progress pt's musculoskeletal strength, neuromuscular control, and endurance while taking into consideration medical acuity and pt safety.    Patient found with: telemetry, oxygen, PureWick    Recent Surgery: * No surgery  found *    General Precautions: Standard, fall   Orthopedic Precautions:    Braces:     Oxygen Device: nasal cannula 2L      Position BP   Supine  134/65 )94) asymptomatic   sitting 124/64 (89) asymptomatic   Transfer to BSC 86/51 (63) asymptomatic   supine 114/56 (81) symptomatic       Exams:    Cognition:  Alert  Command following: Follows multistep verbal commands  Communication: clear/fluent    Sensation:   Light touch sensation: Pt reported generalized numbness/tingling of renae toes (chronic)    Gross Motor Coordination: No deficits noted during functional mobility tasks     Edema/Skin Integrity: None noted; Visible skin intact    Postural examination/scapula alignment: Rounded shoulder and Head forward    Lower Extremity Range of Motion:  Right Lower Extremity: WFL  Left Lower Extremity: WFL    Lower Extremity Strength:  Right Lower Extremity: WFL  Left Lower Extremity: WFL    Functional Mobility:    Bed Mobility:  Scooting with maximal assistance  Supine > Sit with minimum assistance  Sit > Supine with minimum assistance  Rolling L and R: Cari    Transfers:   Sit <> Stand Transfer: Moderate Assistance   x 1 trials from eob with no AD  x 1 trials from BSC with no AD   Bed <> BSC: Moderate Assistance with no AD via step to transfer  Max v/c for sequencing     Balance:  Dynamic Sitting: FAIR+: Maintains balance through MINIMAL excursions of active trunk motion  Standing:  Static: POOR+: Needs MINIMAL assist to maintain   Dynamic: POOR: Needs MOD (moderate) assist during transfer    Outcome Measure: AM-PAC 6 CLICK MOBILITY  Total Score:14     Patient/Caregiver Education:     Therapist educated pt/caregiver regarding:   PT POC and goals for therapy   Safety with mobility and fall risk   Instruction on use of call button and importance of calling nursing staff for assistance with mobility   Time provided for therapeutic counseling and discussion of current health disposition. All questions answered to satisfaction,  within scope of PT practice     Patient/caregiver able to verbalize understanding and expressed no further questions this visit; will follow-up with pt/caregiver during current admit for additional questions/concerns within scope of practice.     White board updated.     Patient left HOB elevated with all lines intact and call button in reach.    GOALS:   Multidisciplinary Problems       Physical Therapy Goals          Problem: Physical Therapy    Goal Priority Disciplines Outcome Goal Variances Interventions   Physical Therapy Goal     PT, PT/OT Progressing     Description: Goals to be met by: 24     Patient will increase functional independence with mobility by performin. Supine to sit with Stand-by Assistance  2. Sit to supine with Stand-by Assistance  3. Sit to stand transfer with Contact Guard Assistance  4. Bed to chair transfer with Contact Guard Assistance using rolling walker  5. Gait  x 150 feet with Contact Guard Assistance using Rolling Walker.    Misa's mobility limitation cannot be sufficiently resolved by the use of a cane. Her functional mobility deficit can be sufficiently resolved with the use of a Rolling Walker. Patient's mobility limitation significantly impairs their ability to participate in one of more activities of daily living.  The use of a RW will significantly improve the patient's ability to participate in MRADLS and the patient will use it on regular basis in the home.                            History:     Past Medical History:   Diagnosis Date    Acute biliary pancreatitis 2024    Acute hypoxemic respiratory failure 2021    Acute superficial venous thrombosis of lower extremity, bilateral 2022    Anemia 2024    Aortic atherosclerosis     Arthritis     knee joint pain    Asthma-COPD overlap syndrome 2022    Breast cancer     left breast & lymph nodes-s/p sx with chemo    Bronchitis     with flu-2014    Cataracts, bilateral     Chronic  anticoagulation 5/4/2022    Chronic diastolic heart failure 12/24/2019    Chronic kidney disease, stage 3     Class 1 obesity due to excess calories with serious comorbidity and body mass index (BMI) of 30.0 to 30.9 in adult 5/16/2024    History of chemotherapy     last treatment 12/2002 (had 8 treatments)    Hyperlipidemia 11/20/2016    Hypertension     Lymphedema of both lower extremities 1/25/2022    Nephrolithiasis 6/2/2014    Osteoporosis     Paroxysmal atrial fibrillation 6/7/2021    Renal calculi     hematuria    Renal osteodystrophy 1/14/2016    Venous stasis dermatitis of both lower extremities 1/25/2022    Vitamin D insufficiency        Past Surgical History:   Procedure Laterality Date    ABLATION, ATRIAL FLUTTER, TYPICAL N/A 6/17/2024    Procedure: Ablation, Atrial Flutter, Typical;  Surgeon: Taz Church MD;  Location: [facility] EP LAB;  Service: Cardiology;  Laterality: N/A;  AFL, RFA, LORENE, AMKAYLA (cx if SR), anes, MB, 3 Prep    BREAST BIOPSY Left 2002    core bx, +    BREAST BIOPSY Right 2018    core    BREAST BIOPSY Right 2019    BREAST LUMPECTOMY Left 2002    CYSTOSCOPY  4/28/2022    Procedure: CYSTOSCOPY;  Surgeon: Vik Ware MD;  Location: [facility] OR 1ST FLR;  Service: Urology;;    CYSTOSCOPY  5/30/2022    Procedure: CYSTOSCOPY;  Surgeon: Bonnie Knutson MD;  Location: [facility] OR 1ST FLR;  Service: Urology;;    ECHOCARDIOGRAM,TRANSESOPHAGEAL N/A 6/17/2024    Procedure: Transesophageal echo (MAKAYLA) intra-procedure log documentation;  Surgeon: Nestor Martinez MD;  Location: [facility] EP LAB;  Service: Cardiology;  Laterality: N/A;    ERCP N/A 7/30/2024    Procedure: ERCP (ENDOSCOPIC RETROGRADE CHOLANGIOPANCREATOGRAPHY);  Surgeon: Sierra Cotto MD;  Location: [facility] ENDO (2ND FLR);  Service: Endoscopy;  Laterality: N/A;    LASER LITHOTRIPSY  5/30/2022    Procedure: LITHOTRIPSY, USING LASER;  Surgeon: Bonnie Knutson MD;  Location: [facility] OR 1ST FLR;  Service: Urology;;    LITHOTRIPSY       MASTECTOMY Left 06/2002    left-& lymph node dissection    REPLACEMENT OF STENT Right 5/30/2022    Procedure: REPLACEMENT, STENT;  Surgeon: Bonnie Knutson MD;  [facility];  Service: Urology;  Laterality: Right;    RETROGRADE PYELOGRAPHY Right 4/28/2022    Procedure: PYELOGRAM, RETROGRADE;  Surgeon: Vik Ware MD;  [facility];  Service: Urology;  Laterality: Right;    ROBOT-ASSISTED LAPAROSCOPIC PARTIAL NEPHRECTOMY USING DA JESÚS XI Right 3/11/2021    Procedure: XI ROBOTIC NEPHRECTOMY, PARTIAL;  Surgeon: Taz Ortega MD;  Location: [facility] OR 2ND FLR;  Service: Urology;  Laterality: Right;  4hr/ gen with regional  [place] confirmation:  [id number] for 11:15am case NC    URETEROSCOPIC REMOVAL OF URETERIC CALCULUS Right 5/30/2022    Procedure: REMOVAL, CALCULUS, URETER, URETEROSCOPIC;  Surgeon: Bonnie Knutson MD;  Location: [facility] OR [address] [place] [address];  Service: Urology;  Laterality: Right;    ureteroscopy Bilateral     6.14    URETEROSCOPY Right 5/30/2022    Procedure: URETEROSCOPY;  Surgeon: Bonnie Knutson MD;  Location: [facility] OR [address] [place] [address];  Service: Urology;  Laterality: Right;       Time Tracking:     PT Received On: 08/30/24  PT Start Time: 0914     PT Stop Time: 0938  PT Total Time (min): 24 min     Billable Minutes: Evaluation 10 and Therapeutic Activity 14    08/30/2024

## 2024-08-30 NOTE — PLAN OF CARE
AAOX4,VSS,O2 sats 97% on 2L NC..Plan of care discussed with patient. Patient worked with PT today. Patient has no complaints of pain, dizziness, or lightheadedness. Discussed medications and care. Patients Hgb on downtrend.  Patient has no questions at this time.Pt visualised and stable, no acute distress noted.Call light within reach.Pt resting,bed at lowest position.Pt's daughter by the bedside.Plan of care ongoing.      Problem: Adult Inpatient Plan of Care  Goal: Plan of Care Review  Outcome: Progressing  Goal: Patient-Specific Goal (Individualized)  Outcome: Progressing  Goal: Absence of Hospital-Acquired Illness or Injury  Outcome: Progressing  Goal: Optimal Comfort and Wellbeing  Outcome: Progressing  Goal: Readiness for Transition of Care  Outcome: Progressing     Problem: Wound  Goal: Optimal Coping  Outcome: Progressing  Goal: Optimal Functional Ability  Outcome: Progressing  Goal: Absence of Infection Signs and Symptoms  Outcome: Progressing  Goal: Improved Oral Intake  Outcome: Progressing  Goal: Optimal Pain Control and Function  Outcome: Progressing  Goal: Skin Health and Integrity  Outcome: Progressing  Goal: Optimal Wound Healing  Outcome: Progressing     Problem: Skin Injury Risk Increased  Goal: Skin Health and Integrity  Outcome: Progressing     Problem: Fall Injury Risk  Goal: Absence of Fall and Fall-Related Injury  Outcome: Progressing

## 2024-08-30 NOTE — PLAN OF CARE
[facility] - Cardiology Stepdown  Discharge Reassessment    Primary Care Provider: Celia Carlisle MD (Inactive)    Expected Discharge Date: 9/2/2024    Reassessment (most recent)       Discharge Reassessment - 08/30/24 1134          Discharge Reassessment    Assessment Type Discharge Planning Reassessment     Did the patient's condition or plan change since previous assessment? No     Discharge Plan discussed with: Adult children     Communicated NOHEMI with patient/caregiver Yes     Discharge Plan A Skilled Nursing Facility     Discharge Plan B Home Health     DME Needed Upon Discharge  none     Transition of Care Barriers None     Why the patient remains in the hospital Requires continued medical care        Post-Acute Status    Post-Acute Authorization Placement     Post-Acute Placement Status Pending medical clearance/testing                   Per provider team pt not ready to discharge until possible Monday 9/2.  MARINE relayed this to Kat in admissions at [facility] ([phone]) who confirmed that she will be working on Monday 9/2 (Labor Day holiday) and would be able to admit pt then if they have a bed available.  Per Kat she has a discharge on Monday 9/2 which would give them another bed if they fill the bed they currently have open for pt.  Provider team updated and MARINE called and relayed this to pt's daughter Marina, to whom MARINE also provided the contact number for weekend MARINE Staton who is scheduled to work on Monday 9/2.  Hand-off provided to Shemar.    Discharge Plan A and Plan B have been determined by review of patient's clinical status, future medical and therapeutic needs, and coverage/benefits for post-acute care in coordination with multidisciplinary team members.      UPDATE 12:44 PM  MARINE returned call to pt's daughter Marina who inquired about a medical update.  MARINE relayed this to provider team requesting that they give Marina a call.      Karey Castelan, MARINE  [facility]  [id number]

## 2024-08-30 NOTE — PLAN OF CARE
PT evaluation complete - see note for details. POC and goals established.    Problem: Physical Therapy  Goal: Physical Therapy Goal  Description: Goals to be met by: 24     Patient will increase functional independence with mobility by performin. Supine to sit with Stand-by Assistance  2. Sit to supine with Stand-by Assistance  3. Sit to stand transfer with Contact Guard Assistance  4. Bed to chair transfer with Contact Guard Assistance using rolling walker  5. Gait  x 150 feet with Contact Guard Assistance using Rolling Walker.    Misa's mobility limitation cannot be sufficiently resolved by the use of a cane. Her functional mobility deficit can be sufficiently resolved with the use of a Rolling Walker. Patient's mobility limitation significantly impairs their ability to participate in one of more activities of daily living.  The use of a RW will significantly improve the patient's ability to participate in MRADLS and the patient will use it on regular basis in the home.     Outcome: Progressing     2024     yes

## 2024-08-30 NOTE — ASSESSMENT & PLAN NOTE
S/p ablation with EP. Has follow-up with Dr. Church in September. At that time, Dr. Church will determine whether she needs to be on Eliquis. Currently on metoprolol and Eliquis.   [id number] Score: 6. EKG with 1st degree avb and rbbb seen on previous EKGs. No afib/flutter seen on telemetry. Eliquis initially held in the ED due to mild anemia, however no longer concerned for an acute bleed. No Afib or flutter on EKG or telemetry throughout this hospital stay.    Plan  - monitor on telemetry  - Resumed home Eliquis 2.5 BID  - F/u w/ Dr. Church, EP, next month

## 2024-08-30 NOTE — SUBJECTIVE & OBJECTIVE
Interval History: No acute events overnight. Pt was tachycardic to the 120's overnight. She was asymptomatic. Telemetry reviewed and EKG ordered. Sinus rhythm on both with no evidence of Afib or flutter. She has no systemic complaints at this time. Specifically denies SOB, chest pain, abdominal pain, or palpitations.       Objective:     Vital Signs (Most Recent):  Temp: 98.4 °F (36.9 °C) (08/30/24 0753)  Pulse: 108 (08/30/24 0753)  Resp: (!) 21 (08/30/24 0753)  BP: 124/61 (08/30/24 0753)  SpO2: 95 % (08/30/24 0753) Vital Signs (24h Range):  Temp:  [97.7 °F (36.5 °C)-98.5 °F (36.9 °C)] 98.4 °F (36.9 °C)  Pulse:  [] 108  Resp:  [17-21] 21  SpO2:  [94 %-97 %] 95 %  BP: (104-152)/(57-72) 124/61     Weight: 70.5 kg (155 lb 6.8 oz)  Body mass index is 26.68 kg/m².    Intake/Output Summary (Last 24 hours) at 8/30/2024 0931  Last data filed at 8/30/2024 0823  Gross per 24 hour   Intake 1686.1 ml   Output 600 ml   Net 1086.1 ml         Physical Exam  Vitals and nursing note reviewed.   Constitutional:       Appearance: Normal appearance.   HENT:      Head: Normocephalic and atraumatic.      Right Ear: External ear normal.      Left Ear: External ear normal.      Nose: Nose normal.      Mouth/Throat:      Mouth: Mucous membranes are moist.   Eyes:      Extraocular Movements: Extraocular movements intact.   Cardiovascular:      Rate and Rhythm: Normal rate and regular rhythm.      Pulses: Normal pulses.   Pulmonary:      Effort: Pulmonary effort is normal.      Breath sounds: Normal breath sounds.   Abdominal:      Palpations: Abdomen is soft.   Musculoskeletal:      Cervical back: Normal range of motion.      Right lower leg: No edema.      Left lower leg: No edema.   Skin:     General: Skin is warm.   Neurological:      General: No focal deficit present.      Mental Status: She is alert and oriented to person, place, and time.   Psychiatric:         Mood and Affect: Mood normal.         Behavior: Behavior normal.          Thought Content: Thought content normal.         Judgment: Judgment normal.             Significant Labs: All pertinent labs within the past 24 hours have been reviewed.    Significant Imaging: I have reviewed all pertinent imaging results/findings within the past 24 hours.

## 2024-08-30 NOTE — ASSESSMENT & PLAN NOTE
Noted history. On lasix 40 at home.  Echo 8/27: EF 60-65%, normal systolic function, normal diastolic function, PASP 34 mmHg, CVP 3. No overt signs of fluid overload on exam.    Plan:  - assess volume status daily  - Hold furosemide

## 2024-08-30 NOTE — NURSING
Patient's Hgb on downtrend, team notified,  and Refuses further intervention. No orders given, plan of care ongoing.

## 2024-08-30 NOTE — ASSESSMENT & PLAN NOTE
Home regimen: Losartan 50 mg qd, Metoprolol succinate. According to pt's daughter, she was off of both medications in May, June, and July and felt well. Once these medications were re-started during recent hospitalization, she began to feel lightheaded and dizzy. Consider stopping anti-hypertensive medications due to symptomatic orthostatic hypotension.     Plan  - Holding Losartan in setting of orthostatic hypotension  - Resume Metoprolol Succinate 100 mg qd today. Will decrease dose tomorrow to 50 mg.

## 2024-08-30 NOTE — ASSESSMENT & PLAN NOTE
Ms. Barajas is an 78 year old female with history as above who presents from her nursing home for syncopal episode while taking a shower at her nursing home. Per her daughter, patient has had periods of lightheadedness over the past 3 weeks, since her ERCP w/ sphincterotomy. States her blood pressure has been low in the SNF despite midodrine treatment. Patient states she had no pro-dromal or post syncopal symptoms. EKG w/ 1st degree AVB, unchanged from previous. She was not taking any BP meds as the nursing home was holding them. Differentials include orthostasis, cardiac related given history of aflutter s/p ablation, neurologic related, drug induced although less likely as nursing home was holding her blood pressure medications and she does not take buspar. Positive orthostatic vitals in the ED and again on 8/28 after receiving 2L volume (1L IVF and 1 unit pRBC). CT head negative for any acute intracranial abnormalities. Echo shows normal systolic function (EF 60-65%) and normal diastolic function. EKG shows a bifascicular block, however this is similar to EKG 1 year ago prior to the onset of dizziness/lightheadedness. Autonomic neuropathy on differential as a cause of orthostatic hypotension. Got additional 1L IVF and started on fludricortisone. Orthostatic vitals repeated: 131/91 to 126/60 (sitting). Patient no longer lightheaded sitting or standing. Unable to collect standing vitals due to leg weakness/deconditioning.    Per daughter, and confirmed with chart check, patient had orthostatic hypotension in May of this year. Losartan was held and she did not have any dizziness in May, June, or July. During recent admission in July for pancreatitis, Losartan was restarted due to hypertension (this was also in setting of pain) and since that time the dizziness and orthostasis have returned.     Plan:  - Continue to hold BP medications.  - continue telemetry  - can consider tilt table testing w/ EP as outpatient if  problem persists  - Stop Fludrocortisone tomorrow

## 2024-08-30 NOTE — PLAN OF CARE
[facility] Hospital Medicine  Plan of Care     Spoke on phone today with patient's daughter, Marina, to provide an update on Ms. Barajas's hospital course. She informed me that while Ms. Barajas was admitted to the hospital in May of this year, she had orthostatic hypotension and was feeling lightheaded. At discharge, she was instructed to hold the Losartan. The remainder of May, June, and July she did not have any lightheadedness on standing. Then, when she was in the hospital recently in July for pancreatitis, the Losartan was restarted because her BP was elevated (daughter attributes this to pain), and since that time the dizziness and orthostasic hypotension have returned. Ms. Barajas does take lasix regularly at home and they do daily weights to monitor fluid status. Developed lower extremity edema when misses doses of lasix.         Lucero Post MD  Anesthesiology/Internal Medicine PGY-1

## 2024-08-31 LAB
ALBUMIN SERPL BCP-MCNC: 2 G/DL (ref 3.5–5.2)
ALP SERPL-CCNC: 114 U/L (ref 55–135)
ALT SERPL W/O P-5'-P-CCNC: 17 U/L (ref 10–44)
ANION GAP SERPL CALC-SCNC: 6 MMOL/L (ref 8–16)
AST SERPL-CCNC: 27 U/L (ref 10–40)
BILIRUB SERPL-MCNC: 0.9 MG/DL (ref 0.1–1)
BUN SERPL-MCNC: 14 MG/DL (ref 8–23)
CALCIUM SERPL-MCNC: 9.3 MG/DL (ref 8.7–10.5)
CHLORIDE SERPL-SCNC: 114 MMOL/L (ref 95–110)
CO2 SERPL-SCNC: 23 MMOL/L (ref 23–29)
CREAT SERPL-MCNC: 1.2 MG/DL (ref 0.5–1.4)
EST. GFR  (NO RACE VARIABLE): 45.8 ML/MIN/1.73 M^2
GLUCOSE SERPL-MCNC: 101 MG/DL (ref 70–110)
MAGNESIUM SERPL-MCNC: 2 MG/DL (ref 1.6–2.6)
PHOSPHATE SERPL-MCNC: 1.9 MG/DL (ref 2.7–4.5)
POTASSIUM SERPL-SCNC: 3.8 MMOL/L (ref 3.5–5.1)
PROT SERPL-MCNC: 5.1 G/DL (ref 6–8.4)
SODIUM SERPL-SCNC: 143 MMOL/L (ref 136–145)

## 2024-08-31 PROCEDURE — 36415 COLL VENOUS BLD VENIPUNCTURE: CPT | Performed by: STUDENT IN AN ORGANIZED HEALTH CARE EDUCATION/TRAINING PROGRAM

## 2024-08-31 PROCEDURE — 25000003 PHARM REV CODE 250: Performed by: INTERNAL MEDICINE

## 2024-08-31 PROCEDURE — 25000003 PHARM REV CODE 250

## 2024-08-31 PROCEDURE — 94640 AIRWAY INHALATION TREATMENT: CPT

## 2024-08-31 PROCEDURE — 80053 COMPREHEN METABOLIC PANEL: CPT | Performed by: STUDENT IN AN ORGANIZED HEALTH CARE EDUCATION/TRAINING PROGRAM

## 2024-08-31 PROCEDURE — 84100 ASSAY OF PHOSPHORUS: CPT | Performed by: STUDENT IN AN ORGANIZED HEALTH CARE EDUCATION/TRAINING PROGRAM

## 2024-08-31 PROCEDURE — 83735 ASSAY OF MAGNESIUM: CPT | Performed by: STUDENT IN AN ORGANIZED HEALTH CARE EDUCATION/TRAINING PROGRAM

## 2024-08-31 PROCEDURE — 20600001 HC STEP DOWN PRIVATE ROOM

## 2024-08-31 RX ORDER — SODIUM,POTASSIUM PHOSPHATES 280-250MG
2 POWDER IN PACKET (EA) ORAL EVERY 4 HOURS
Status: DISPENSED | OUTPATIENT
Start: 2024-08-31 | End: 2024-08-31

## 2024-08-31 RX ADMIN — TIOTROPIUM BROMIDE INHALATION SPRAY 2 PUFF: 3.12 SPRAY, METERED RESPIRATORY (INHALATION) at 08:08

## 2024-08-31 RX ADMIN — FLUTICASONE FUROATE AND VILANTEROL TRIFENATATE 1 PUFF: 100; 25 POWDER RESPIRATORY (INHALATION) at 08:08

## 2024-08-31 RX ADMIN — POTASSIUM & SODIUM PHOSPHATES POWDER PACK 280-160-250 MG 2 PACKET: 280-160-250 PACK at 08:08

## 2024-08-31 RX ADMIN — APIXABAN 5 MG: 5 TABLET, FILM COATED ORAL at 08:08

## 2024-08-31 RX ADMIN — PANTOPRAZOLE SODIUM 40 MG: 40 TABLET, DELAYED RELEASE ORAL at 06:08

## 2024-08-31 RX ADMIN — METOPROLOL SUCCINATE 50 MG: 50 TABLET, EXTENDED RELEASE ORAL at 08:08

## 2024-08-31 NOTE — ASSESSMENT & PLAN NOTE
Home regimen: Losartan 50 mg qd, Metoprolol succinate. According to pt's daughter, she was off of both medications in May, June, and July and felt well. Once these medications were re-started during recent hospitalization, she began to feel lightheaded and dizzy. Consider stopping anti-hypertensive medications due to symptomatic orthostatic hypotension.     Plan  - Holding Losartan in setting of orthostatic hypotension. Will discontinue this medication indefinitely.   - Decreased Metoprolol Succinate from 100 mg to 50 mg qd

## 2024-08-31 NOTE — SUBJECTIVE & OBJECTIVE
Interval History: No acute events overnight. Patient is doing well with no systemic complaints at this time. Denies any dizziness/lightheadedness when moving from bed to the chair this morning.       Objective:     Vital Signs (Most Recent):  Temp: 97.7 °F (36.5 °C) (08/31/24 1125)  Pulse: 78 (08/31/24 1125)  Resp: 16 (08/31/24 1125)  BP: 134/61 (08/31/24 1125)  SpO2: 95 % (08/31/24 1125) Vital Signs (24h Range):  Temp:  [97.7 °F (36.5 °C)-98.8 °F (37.1 °C)] 97.7 °F (36.5 °C)  Pulse:  [72-86] 78  Resp:  [16-18] 16  SpO2:  [92 %-96 %] 95 %  BP: (114-162)/(58-74) 134/61     Weight: 70.5 kg (155 lb 6.8 oz)  Body mass index is 26.68 kg/m².    Intake/Output Summary (Last 24 hours) at 8/31/2024 1151  Last data filed at 8/31/2024 0015  Gross per 24 hour   Intake --   Output 750 ml   Net -750 ml         Physical Exam  Vitals and nursing note reviewed.   Constitutional:       Appearance: Normal appearance.   HENT:      Head: Normocephalic and atraumatic.      Right Ear: External ear normal.      Left Ear: External ear normal.      Nose: Nose normal.      Mouth/Throat:      Mouth: Mucous membranes are moist.   Eyes:      Pupils: Pupils are equal, round, and reactive to light.   Cardiovascular:      Rate and Rhythm: Normal rate and regular rhythm.      Pulses: Normal pulses.      Heart sounds: Normal heart sounds.   Pulmonary:      Effort: Pulmonary effort is normal. No respiratory distress.      Breath sounds: Normal breath sounds. No wheezing.   Abdominal:      Palpations: Abdomen is soft.   Musculoskeletal:      Cervical back: Normal range of motion.      Right lower leg: No edema.      Left lower leg: No edema.   Skin:     General: Skin is warm.   Neurological:      General: No focal deficit present.      Mental Status: She is alert and oriented to person, place, and time.   Psychiatric:         Mood and Affect: Mood normal.         Behavior: Behavior normal.         Thought Content: Thought content normal.          Judgment: Judgment normal.             Significant Labs: All pertinent labs within the past 24 hours have been reviewed.    Significant Imaging: I have reviewed all pertinent imaging results/findings within the past 24 hours.

## 2024-08-31 NOTE — PLAN OF CARE
Plan of care reviewed with patient  Call light within reach, advised to call for assistance if needed  No complaints made  Problem: Adult Inpatient Plan of Care  Goal: Plan of Care Review  Outcome: Progressing  Goal: Patient-Specific Goal (Individualized)  Outcome: Progressing  Goal: Absence of Hospital-Acquired Illness or Injury  Outcome: Progressing  Goal: Optimal Comfort and Wellbeing  Outcome: Progressing  Goal: Readiness for Transition of Care  Outcome: Progressing     Problem: Wound  Goal: Optimal Coping  Outcome: Progressing  Goal: Optimal Functional Ability  Outcome: Progressing  Goal: Absence of Infection Signs and Symptoms  Outcome: Progressing  Goal: Improved Oral Intake  Outcome: Progressing  Goal: Optimal Pain Control and Function  Outcome: Progressing  Goal: Skin Health and Integrity  Outcome: Progressing  Goal: Optimal Wound Healing  Outcome: Progressing     Problem: Skin Injury Risk Increased  Goal: Skin Health and Integrity  Outcome: Progressing     Problem: Fall Injury Risk  Goal: Absence of Fall and Fall-Related Injury  Outcome: Progressing

## 2024-08-31 NOTE — PROGRESS NOTES
"[facility] Card[facility] Medicine  Progress Note    Patient Name: Misa Barajas  MRN: [id number]  Patient Class: IP- Inpatient   Admission Date: 2024  Length of Stay: 4 days  Attending Physician: Talha Perdomo MD  Primary Care Provider: Celia Carlisle MD (Inactive)        Subjective:     Principal Problem:Orthostatic hypotension        HPI:  Ms. Barajas is a 78 year old female with a history of COPD chronically on 2L, CKD, HFpEF, remote hx of breast cx s/p chemo c/b polyneuropathy, paroxysmal Afib/flutter who presents from [facility] for new onset dizziness that started around 7am this morning. Patient states she was taking a shower with her nursing aid this morning when she acutely began feeling like she was about to pass out. She denies that the room was spinning but instead endorses lightheadedness. She states she may have passed out for 1-2 minutes as per her nursing aid and remembers being held up by the nursing home staff. She denies any chest pain, palpitations, dyspnea, or any other symptoms prior to becoming dizzy. She denies any post-syncopal symptoms as well. She currently states she feels fine but may become "dizzy" again if she were to stand up. She also denies any new symptoms including rashes, wounds, dysuria, hematuria, hematemesis, abdominal pain, fever, chills. She is unsure if she has melena as nursing home staff changes her diapers. She states this has never happened in the past.     She has had multiple admissions over the last couple of months includin/7- HF exacerbation, AIN, E.faecalis UTI.   - admission for cellulitis and pastruella bacteremia s/p 2 week treatment w/ unaysyn and doxycline.  - admission for gallstone pancreatitis w/ possible early cholecystitis s/p ERCP w/ sphincterotomy on .     I spoke with the patient's daughter over the phone Marina. She states the patient began having symptoms of dizziness around 3 weeks " ago. She noticed it began after her ERCP w/ sphincterotomy. While in rehab the daughter states patient has been persistently orthostatic with low blood pressures and dizziness with standing. The SNF attempted to use increasing doses midodrine and meclizine however she continued to be dizzy and orthostatic.     In the ED patient was noted to be orthostatic with /62 HR 82 when lying and  w/ BP 65/35 when standing. Lactic acid was 1.6, labs otherwise remarkable for downtrending Hgb 10->8. She was started on 1U PRBC and fluids and admitted for further work-up.              Overview/Hospital Course:  Misa Barajas is a 78 y.o. F admitted to hospital medicine for syncope and orthostatic hypotension. GI was consulted for symptomatic anemia. CBC was trended and remained stable after 1unit pRBC. There was no melena, hematochezia, hematemesis, or other signs of active bleeding. GI recommended PPI, but no endoscopy. Telemetry monitoring did not show any afib or flutter. EKG did reveal a bifascicular block. Cardiology curbsided, and bifascicular block felt very unlikely to be contributing to lightheadeness/syncope. Home antihypertensives were held. Infectious workup negative. She received additional 1L IVF on HD1 and HD2. Orthostatic vitals were monitored each day, and the degree of drop in SBP going from laying to sitting continued to improve. Notably, on echo her CVP was 3 mmHg, consistent with intravascular volume depletion. No signs of acute decompensated heart failure (SOB, leg swelling). She continued to feel lightheaded with going from laying to sitting. With ongoing fluid resuscitation and one dose of fludrocortisone, the lightheadedness with sitting/standing eventually resolved and SBP only dropped 4 mmHg going from laying to sitting. Dehydration and intravascular volume depletion is most likely etiology for orthostasis, in setting of recent hospitalization (ERCP and sphincterotomy for gallstone  pancreatitis) and diuretic use outpatient, and likely reduced PO intake. With ongoing fluid resuscitation, Hgb slowly downtrended along with WBC and platelet count. Consistent with hemodilution. No signs of bleeding. Continued to monitor CBC. Fludricortisone was discontinued and began to reintroduce home meds, starting with Metoprolol. Andres she has a history of orthostatic hypotension in the past that resolved after stopping Losartan, however Losartan was resumed after most recent hospitalization. Will discontinue this medication.     Interval History: No acute events overnight. Patient is doing well with no systemic complaints at this time. Denies any dizziness/lightheadedness when moving from bed to the chair this morning.       Objective:     Vital Signs (Most Recent):  Temp: 97.7 °F (36.5 °C) (08/31/24 1125)  Pulse: 78 (08/31/24 1125)  Resp: 16 (08/31/24 1125)  BP: 134/61 (08/31/24 1125)  SpO2: 95 % (08/31/24 1125) Vital Signs (24h Range):  Temp:  [97.7 °F (36.5 °C)-98.8 °F (37.1 °C)] 97.7 °F (36.5 °C)  Pulse:  [72-86] 78  Resp:  [16-18] 16  SpO2:  [92 %-96 %] 95 %  BP: (114-162)/(58-74) 134/61     Weight: 70.5 kg (155 lb 6.8 oz)  Body mass index is 26.68 kg/m².    Intake/Output Summary (Last 24 hours) at 8/31/2024 1151  Last data filed at 8/31/2024 0015  Gross per 24 hour   Intake --   Output 750 ml   Net -750 ml         Physical Exam  Vitals and nursing note reviewed.   Constitutional:       Appearance: Normal appearance.   HENT:      Head: Normocephalic and atraumatic.      Right Ear: External ear normal.      Left Ear: External ear normal.      Nose: Nose normal.      Mouth/Throat:      Mouth: Mucous membranes are moist.   Eyes:      Pupils: Pupils are equal, round, and reactive to light.   Cardiovascular:      Rate and Rhythm: Normal rate and regular rhythm.      Pulses: Normal pulses.      Heart sounds: Normal heart sounds.   Pulmonary:      Effort: Pulmonary effort is normal. No respiratory distress.       Breath sounds: Normal breath sounds. No wheezing.   Abdominal:      Palpations: Abdomen is soft.   Musculoskeletal:      Cervical back: Normal range of motion.      Right lower leg: No edema.      Left lower leg: No edema.   Skin:     General: Skin is warm.   Neurological:      General: No focal deficit present.      Mental Status: She is alert and oriented to person, place, and time.   Psychiatric:         Mood and Affect: Mood normal.         Behavior: Behavior normal.         Thought Content: Thought content normal.         Judgment: Judgment normal.             Significant Labs: All pertinent labs within the past 24 hours have been reviewed.    Significant Imaging: I have reviewed all pertinent imaging results/findings within the past 24 hours.    Assessment/Plan:      * Orthostatic hypotension  Ms. Barajas is an 78 year old female with history as above who presents from her nursing home for syncopal episode while taking a shower at her nursing home. Per her daughter, patient has had periods of lightheadedness over the past 3 weeks, since her ERCP w/ sphincterotomy. States her blood pressure has been low in the SNF despite midodrine treatment. Patient states she had no pro-dromal or post syncopal symptoms. EKG w/ 1st degree AVB, unchanged from previous. She was not taking any BP meds as the nursing home was holding them. Differentials include orthostasis, cardiac related given history of aflutter s/p ablation, neurologic related, drug induced although less likely as nursing home was holding her blood pressure medications and she does not take buspar. Positive orthostatic vitals in the ED and again on 8/28 after receiving 2L volume (1L IVF and 1 unit pRBC). CT head negative for any acute intracranial abnormalities. Echo shows normal systolic function (EF 60-65%) and normal diastolic function. EKG shows a bifascicular block, however this is similar to EKG 1 year ago prior to the onset of  dizziness/lightheadedness. Autonomic neuropathy on differential as a cause of orthostatic hypotension. Got additional 1L IVF and started on fludricortisone. Orthostatic vitals repeated: 131/91 to 126/60 (sitting). Patient no longer lightheaded sitting or standing. Unable to collect standing vitals due to leg weakness/deconditioning.    Per daughter, and confirmed with chart check, patient had orthostatic hypotension in May of this year. Losartan was held and she did not have any dizziness in May, June, or July. During recent admission in July for pancreatitis, Losartan was restarted due to hypertension (this was also in setting of pain) and since that time the dizziness and orthostasis have returned.     Plan:  - Continue to hold BP medications.  - continue telemetry  - can consider tilt table testing w/ EP as outpatient if problem persists  - Stop Fludrocortisone        Acute blood loss anemia  Appears her baseline was 10-11 over the past couple of months and has down trended to 8.2 on admission. Per ED there was blood noted on ZAHEER and concern for oozing from prior sphincterotomy site done on 7/30 given she is also on Eliquis. She received 1U PRBC in the ED. Hgb has remained stable on repeat CBC. No additional blood noted. GI consulted and recommended PPI and continued CBC trending, but no plans for endoscopy. Per GI, ok to resume eliquis. Hgb has remained overall stable. There have been slight drops (9.8 > 9 > 8.9) following several liters of IVF rehydration with associated drops in WBC and platelet count, representative of hemodilution. Additionally, no signs of active bleeding.     - PPI: protonix 40 mg BID  - Continue to monitor CBC    Hypercoagulability due to atrial fibrillation  S/p ablation with EP. Has follow-up with Dr. Church in September. At that time, Dr. Church will determine whether she needs to be on Eliquis. Currently on metoprolol and Eliquis.   [id number] Score: 6. EKG with 1st degree avb and  rbbb seen on previous EKGs. No afib/flutter seen on telemetry. Eliquis initially held in the ED due to mild anemia, however no longer concerned for an acute bleed. No Afib or flutter on EKG or telemetry throughout this hospital stay.    Plan  - monitor on telemetry  - Resumed home Eliquis 2.5 BID  - F/u w/ Dr. Church EP, next month      Diastolic CHF, chronic  Noted history. On lasix 40 at home.  Echo 8/27: EF 60-65%, normal systolic function, normal diastolic function, PASP 34 mmHg, CVP 3. No overt signs of fluid overload on exam.    Plan:  - assess volume status daily  - Hold furosemide      COPD (chronic obstructive pulmonary disease)  Patient's COPD is controlled currently.  Patient is currently off COPD Pathway. Continue scheduled inhalers and monitor respiratory status closely.     - On home 2L, no wheezing on my exam  - CXR on admission  - Breo with prn albuterol while inpatient    Chronic respiratory failure with hypoxia  Currently on 2L home oxygen for COPD. Saturating >96%.    CKD (chronic kidney disease) stage 3, GFR 30-59 ml/min  Creatine stable for now. BMP reviewed- noted Estimated Creatinine Clearance: 41 mL/min (based on SCr of 1.1 mg/dL). according to latest data. Based on current GFR, CKD stage is stage 3 - GFR 30-59.  Monitor UOP and serial BMP and adjust therapy as needed. Renally dose meds. Avoid nephrotoxic medications and procedures.        ACP (advance care planning)  Patient made DNR in the ED. I confirmed with patient and her daughter about code status. They have prior paperwork signed, not in our system.      HTN (hypertension)  Home regimen: Losartan 50 mg qd, Metoprolol succinate. According to pt's daughter, she was off of both medications in May, June, and July and felt well. Once these medications were re-started during recent hospitalization, she began to feel lightheaded and dizzy. Consider stopping anti-hypertensive medications due to symptomatic orthostatic hypotension.      Plan  - Holding Losartan in setting of orthostatic hypotension. Will discontinue this medication indefinitely.   - Decreased Metoprolol Succinate from 100 mg to 50 mg qd      VTE Risk Mitigation (From admission, onward)           Ordered     apixaban tablet 5 mg  2 times daily         08/27/24 1454     Reason for No Pharmacological VTE Prophylaxis  Once        Question:  Reasons:  Answer:  Active Bleeding    08/26/24 2051     IP VTE HIGH RISK PATIENT  Once         08/26/24 2051     Place sequential compression device  Until discontinued         08/26/24 2033                    Discharge Planning   NOHEMI: 9/2/2024     Code Status: DNR   Is the patient medically ready for discharge?:     Reason for patient still in hospital (select all that apply): Patient trending condition  Discharge Plan A: Skilled Nursing Facility                  Lucero Post MD  Department of Hospital Medicine   [facility] - Cardiology Stepdown

## 2024-08-31 NOTE — ASSESSMENT & PLAN NOTE
Ms. Barajas is an 78 year old female with history as above who presents from her nursing home for syncopal episode while taking a shower at her nursing home. Per her daughter, patient has had periods of lightheadedness over the past 3 weeks, since her ERCP w/ sphincterotomy. States her blood pressure has been low in the SNF despite midodrine treatment. Patient states she had no pro-dromal or post syncopal symptoms. EKG w/ 1st degree AVB, unchanged from previous. She was not taking any BP meds as the nursing home was holding them. Differentials include orthostasis, cardiac related given history of aflutter s/p ablation, neurologic related, drug induced although less likely as nursing home was holding her blood pressure medications and she does not take buspar. Positive orthostatic vitals in the ED and again on 8/28 after receiving 2L volume (1L IVF and 1 unit pRBC). CT head negative for any acute intracranial abnormalities. Echo shows normal systolic function (EF 60-65%) and normal diastolic function. EKG shows a bifascicular block, however this is similar to EKG 1 year ago prior to the onset of dizziness/lightheadedness. Autonomic neuropathy on differential as a cause of orthostatic hypotension. Got additional 1L IVF and started on fludricortisone. Orthostatic vitals repeated: 131/91 to 126/60 (sitting). Patient no longer lightheaded sitting or standing. Unable to collect standing vitals due to leg weakness/deconditioning.    Per daughter, and confirmed with chart check, patient had orthostatic hypotension in May of this year. Losartan was held and she did not have any dizziness in May, June, or July. During recent admission in July for pancreatitis, Losartan was restarted due to hypertension (this was also in setting of pain) and since that time the dizziness and orthostasis have returned.     Plan:  - Continue to hold BP medications.  - continue telemetry  - can consider tilt table testing w/ EP as outpatient if  problem persists  - Stop Fludrocortisone

## 2024-09-01 LAB
ALBUMIN SERPL BCP-MCNC: 1.9 G/DL (ref 3.5–5.2)
ALP SERPL-CCNC: 108 U/L (ref 55–135)
ALT SERPL W/O P-5'-P-CCNC: 16 U/L (ref 10–44)
ANION GAP SERPL CALC-SCNC: 7 MMOL/L (ref 8–16)
AST SERPL-CCNC: 23 U/L (ref 10–40)
BACTERIA BLD CULT: NORMAL
BACTERIA BLD CULT: NORMAL
BASOPHILS # BLD AUTO: 0.04 K/UL (ref 0–0.2)
BASOPHILS NFR BLD: 0.5 % (ref 0–1.9)
BILIRUB SERPL-MCNC: 0.8 MG/DL (ref 0.1–1)
BUN SERPL-MCNC: 16 MG/DL (ref 8–23)
CALCIUM SERPL-MCNC: 9.4 MG/DL (ref 8.7–10.5)
CHLORIDE SERPL-SCNC: 112 MMOL/L (ref 95–110)
CO2 SERPL-SCNC: 21 MMOL/L (ref 23–29)
CREAT SERPL-MCNC: 1 MG/DL (ref 0.5–1.4)
DIFFERENTIAL METHOD BLD: ABNORMAL
EOSINOPHIL # BLD AUTO: 0.5 K/UL (ref 0–0.5)
EOSINOPHIL NFR BLD: 6.1 % (ref 0–8)
ERYTHROCYTE [DISTWIDTH] IN BLOOD BY AUTOMATED COUNT: 17.9 % (ref 11.5–14.5)
EST. GFR  (NO RACE VARIABLE): 57 ML/MIN/1.73 M^2
GLUCOSE SERPL-MCNC: 90 MG/DL (ref 70–110)
HCT VFR BLD AUTO: 28.4 % (ref 37–48.5)
HGB BLD-MCNC: 8.7 G/DL (ref 12–16)
IMM GRANULOCYTES # BLD AUTO: 0.02 K/UL (ref 0–0.04)
IMM GRANULOCYTES NFR BLD AUTO: 0.3 % (ref 0–0.5)
LYMPHOCYTES # BLD AUTO: 1.8 K/UL (ref 1–4.8)
LYMPHOCYTES NFR BLD: 23.6 % (ref 18–48)
MAGNESIUM SERPL-MCNC: 2 MG/DL (ref 1.6–2.6)
MCH RBC QN AUTO: 28.5 PG (ref 27–31)
MCHC RBC AUTO-ENTMCNC: 30.6 G/DL (ref 32–36)
MCV RBC AUTO: 93 FL (ref 82–98)
MONOCYTES # BLD AUTO: 0.9 K/UL (ref 0.3–1)
MONOCYTES NFR BLD: 11.6 % (ref 4–15)
NEUTROPHILS # BLD AUTO: 4.4 K/UL (ref 1.8–7.7)
NEUTROPHILS NFR BLD: 57.9 % (ref 38–73)
NRBC BLD-RTO: 0 /100 WBC
OHS QRS DURATION: 152 MS
OHS QRS DURATION: 154 MS
OHS QTC CALCULATION: 463 MS
OHS QTC CALCULATION: 480 MS
PHOSPHATE SERPL-MCNC: 2 MG/DL (ref 2.7–4.5)
PLATELET # BLD AUTO: 239 K/UL (ref 150–450)
PMV BLD AUTO: 9.8 FL (ref 9.2–12.9)
POTASSIUM SERPL-SCNC: 3.8 MMOL/L (ref 3.5–5.1)
PROT SERPL-MCNC: 5 G/DL (ref 6–8.4)
RBC # BLD AUTO: 3.05 M/UL (ref 4–5.4)
SODIUM SERPL-SCNC: 140 MMOL/L (ref 136–145)
WBC # BLD AUTO: 7.57 K/UL (ref 3.9–12.7)

## 2024-09-01 PROCEDURE — 99900035 HC TECH TIME PER 15 MIN (STAT)

## 2024-09-01 PROCEDURE — 80053 COMPREHEN METABOLIC PANEL: CPT | Performed by: STUDENT IN AN ORGANIZED HEALTH CARE EDUCATION/TRAINING PROGRAM

## 2024-09-01 PROCEDURE — 94640 AIRWAY INHALATION TREATMENT: CPT

## 2024-09-01 PROCEDURE — 27000221 HC OXYGEN, UP TO 24 HOURS

## 2024-09-01 PROCEDURE — 94761 N-INVAS EAR/PLS OXIMETRY MLT: CPT

## 2024-09-01 PROCEDURE — 93010 ELECTROCARDIOGRAM REPORT: CPT | Mod: ,,, | Performed by: INTERNAL MEDICINE

## 2024-09-01 PROCEDURE — 83735 ASSAY OF MAGNESIUM: CPT | Performed by: STUDENT IN AN ORGANIZED HEALTH CARE EDUCATION/TRAINING PROGRAM

## 2024-09-01 PROCEDURE — 93005 ELECTROCARDIOGRAM TRACING: CPT

## 2024-09-01 PROCEDURE — 25000003 PHARM REV CODE 250

## 2024-09-01 PROCEDURE — 84100 ASSAY OF PHOSPHORUS: CPT | Performed by: STUDENT IN AN ORGANIZED HEALTH CARE EDUCATION/TRAINING PROGRAM

## 2024-09-01 PROCEDURE — 20600001 HC STEP DOWN PRIVATE ROOM

## 2024-09-01 PROCEDURE — 25000003 PHARM REV CODE 250: Performed by: INTERNAL MEDICINE

## 2024-09-01 PROCEDURE — 85025 COMPLETE CBC W/AUTO DIFF WBC: CPT

## 2024-09-01 PROCEDURE — 36415 COLL VENOUS BLD VENIPUNCTURE: CPT | Performed by: STUDENT IN AN ORGANIZED HEALTH CARE EDUCATION/TRAINING PROGRAM

## 2024-09-01 PROCEDURE — 36415 COLL VENOUS BLD VENIPUNCTURE: CPT

## 2024-09-01 RX ORDER — HYDROXYZINE HYDROCHLORIDE 25 MG/1
25 TABLET, FILM COATED ORAL 3 TIMES DAILY PRN
Status: DISCONTINUED | OUTPATIENT
Start: 2024-09-01 | End: 2024-09-03 | Stop reason: HOSPADM

## 2024-09-01 RX ADMIN — PANTOPRAZOLE SODIUM 40 MG: 40 TABLET, DELAYED RELEASE ORAL at 06:09

## 2024-09-01 RX ADMIN — HYDROXYZINE HYDROCHLORIDE 25 MG: 25 TABLET ORAL at 04:09

## 2024-09-01 RX ADMIN — APIXABAN 5 MG: 5 TABLET, FILM COATED ORAL at 08:09

## 2024-09-01 RX ADMIN — FLUTICASONE FUROATE AND VILANTEROL TRIFENATATE 1 PUFF: 100; 25 POWDER RESPIRATORY (INHALATION) at 08:09

## 2024-09-01 RX ADMIN — TIOTROPIUM BROMIDE INHALATION SPRAY 2 PUFF: 3.12 SPRAY, METERED RESPIRATORY (INHALATION) at 08:09

## 2024-09-01 RX ADMIN — METOPROLOL SUCCINATE 50 MG: 50 TABLET, EXTENDED RELEASE ORAL at 08:09

## 2024-09-01 RX ADMIN — HYDROXYZINE HYDROCHLORIDE 25 MG: 25 TABLET ORAL at 09:09

## 2024-09-01 RX ADMIN — APIXABAN 5 MG: 5 TABLET, FILM COATED ORAL at 09:09

## 2024-09-01 RX ADMIN — PANTOPRAZOLE SODIUM 40 MG: 40 TABLET, DELAYED RELEASE ORAL at 04:09

## 2024-09-01 NOTE — PLAN OF CARE
Plan of care reviewed with patient.   Call bell within reach and advised to call assistance.     Problem: Adult Inpatient Plan of Care  Goal: Plan of Care Review  Outcome: Progressing  Goal: Patient-Specific Goal (Individualized)  Outcome: Progressing  Goal: Absence of Hospital-Acquired Illness or Injury  Outcome: Progressing  Goal: Optimal Comfort and Wellbeing  Outcome: Progressing  Goal: Readiness for Transition of Care  Outcome: Progressing     Problem: Wound  Goal: Optimal Coping  Outcome: Progressing  Goal: Optimal Functional Ability  Outcome: Progressing  Goal: Absence of Infection Signs and Symptoms  Outcome: Progressing  Goal: Improved Oral Intake  Outcome: Progressing  Goal: Optimal Pain Control and Function  Outcome: Progressing  Goal: Skin Health and Integrity  Outcome: Progressing  Goal: Optimal Wound Healing  Outcome: Progressing     Problem: Skin Injury Risk Increased  Goal: Skin Health and Integrity  Outcome: Progressing     Problem: Fall Injury Risk  Goal: Absence of Fall and Fall-Related Injury  Outcome: Progressing

## 2024-09-01 NOTE — SUBJECTIVE & OBJECTIVE
Interval History: kristofer nowak, felt some uneasiness over night but feels better this morning. Doing fine on metoprolol 50 mg will keep it and will not resume losartan. Likely will be discharged to SNF tomorrow.     Review of Systems  Objective:     Vital Signs (Most Recent):  Temp: 98 °F (36.7 °C) (09/01/24 1147)  Pulse: 81 (09/01/24 1147)  Resp: 18 (09/01/24 1147)  BP: 135/62 (09/01/24 1147)  SpO2: 100 % (09/01/24 1147) Vital Signs (24h Range):  Temp:  [97.7 °F (36.5 °C)-98.3 °F (36.8 °C)] 98 °F (36.7 °C)  Pulse:  [72-88] 81  Resp:  [15-23] 18  SpO2:  [90 %-100 %] 100 %  BP: (127-163)/(60-76) 135/62     Weight: 70.5 kg (155 lb 6.8 oz)  Body mass index is 26.68 kg/m².    Intake/Output Summary (Last 24 hours) at 9/1/2024 1218  Last data filed at 9/1/2024 0820  Gross per 24 hour   Intake 725 ml   Output 925 ml   Net -200 ml         Physical Exam      Vitals and nursing note reviewed.   Constitutional:       Appearance: Normal appearance.   HENT:      Head: Normocephalic and atraumatic.      Right Ear: External ear normal.      Left Ear: External ear normal.      Nose: Nose normal.      Mouth/Throat:      Mouth: Mucous membranes are moist.   Eyes:      Pupils: Pupils are equal, round, and reactive to light.   Cardiovascular:      Rate and Rhythm: Normal rate and regular rhythm.      Pulses: Normal pulses.      Heart sounds: Normal heart sounds.   Pulmonary:      Effort: Pulmonary effort is normal. No respiratory distress.      Breath sounds: Normal breath sounds. No wheezing.   Abdominal:      Palpations: Abdomen is soft.   Musculoskeletal:      Cervical back: Normal range of motion.      Right lower leg: No edema.      Left lower leg: No edema.   Skin:     General: Skin is warm.   Neurological:      General: No focal deficit present.      Mental Status: She is alert and oriented to person, place, and time.   Psychiatric:         Mood and Affect: Mood normal.         Behavior: Behavior normal.         Thought Content:  Thought content normal.         Judgment: Judgment normal.   Significant Labs: All pertinent labs within the past 24 hours have been reviewed.    Significant Imaging: I have reviewed all pertinent imaging results/findings within the past 24 hours.

## 2024-09-01 NOTE — NURSING
At 0423, pt called and asked for anxiety meds.   Upon assessment, pt seems to be uneasy, verbalized feeling like flutters in her heart, noted that heart rate is irregular now, ordered EKG and informed Dr Concepcion.    Atarax given as per MAR, vital signs are stable but pt is anxious, still denies chest pain.

## 2024-09-01 NOTE — ASSESSMENT & PLAN NOTE
S/p ablation with EP. Has follow-up with Dr. Church in September. At that time, Dr. Church will determine whether she needs to be on Eliquis. Currently on metoprolol and Eliquis.   [id number] Score: 6. EKG with 1st degree avb and rbbb seen on previous EKGs. No afib/flutter seen on telemetry. Eliquis initially held in the ED due to mild anemia, however no longer concerned for an acute bleed. No Afib or flutter on EKG or telemetry throughout this hospital stay.    Plan  - monitor on telemetry  - Resumed home Eliquis 2.5 BID  - F/u w/ Dr. Church, EP, next month

## 2024-09-01 NOTE — ASSESSMENT & PLAN NOTE
Noted history. On lasix 40 at home.  Echo 8/27: EF 60-65%, normal systolic function, normal diastolic function, PASP 34 mmHg, CVP 3. No overt signs of fluid overload on exam.    Plan:  - assess volume status daily  - Hold furosemide  -D/C losartan

## 2024-09-01 NOTE — PROGRESS NOTES
"[facility] - Cardiology [facility] Medicine  Progress Note    Patient Name: Misa Barajas  MRN: [id number]  Patient Class: IP- Inpatient   Admission Date: 2024  Length of Stay: 5 days  Attending Physician: Talha Perdomo MD  Primary Care Provider: Celia Carlisle MD (Inactive)        Subjective:     Principal Problem:Orthostatic hypotension        HPI:  Ms. Barajas is a 78 year old female with a history of COPD chronically on 2L, CKD, HFpEF, remote hx of breast cx s/p chemo c/b polyneuropathy, paroxysmal Afib/flutter who presents from [facility] for new onset dizziness that started around 7am this morning. Patient states she was taking a shower with her nursing aid this morning when she acutely began feeling like she was about to pass out. She denies that the room was spinning but instead endorses lightheadedness. She states she may have passed out for 1-2 minutes as per her nursing aid and remembers being held up by the nursing home staff. She denies any chest pain, palpitations, dyspnea, or any other symptoms prior to becoming dizzy. She denies any post-syncopal symptoms as well. She currently states she feels fine but may become "dizzy" again if she were to stand up. She also denies any new symptoms including rashes, wounds, dysuria, hematuria, hematemesis, abdominal pain, fever, chills. She is unsure if she has melena as nursing home staff changes her diapers. She states this has never happened in the past.     She has had multiple admissions over the last couple of months includin/7- HF exacerbation, AIN, E.faecalis UTI.   - admission for cellulitis and pastruella bacteremia s/p 2 week treatment w/ unaysyn and doxycline.  - admission for gallstone pancreatitis w/ possible early cholecystitis s/p ERCP w/ sphincterotomy on .     I spoke with the patient's daughter over the phone [place]. She states the patient began having symptoms of dizziness around 3 weeks " ago. She noticed it began after her ERCP w/ sphincterotomy. While in rehab the daughter states patient has been persistently orthostatic with low blood pressures and dizziness with standing. The SNF attempted to use increasing doses midodrine and meclizine however she continued to be dizzy and orthostatic.     In the ED patient was noted to be orthostatic with /62 HR 82 when lying and  w/ BP 65/35 when standing. Lactic acid was 1.6, labs otherwise remarkable for downtrending Hgb 10->8. She was started on 1U PRBC and fluids and admitted for further work-up.              Overview/Hospital Course:  Misa Barajas is a 78 y.o. F admitted to hospital medicine for syncope and orthostatic hypotension. GI was consulted for symptomatic anemia. CBC was trended and remained stable after 1unit pRBC. There was no melena, hematochezia, hematemesis, or other signs of active bleeding. GI recommended PPI, but no endoscopy. Telemetry monitoring did not show any afib or flutter. EKG did reveal a bifascicular block. Cardiology curbsided, and bifascicular block felt very unlikely to be contributing to lightheadeness/syncope. Home antihypertensives were held. Infectious workup negative. She received additional 1L IVF on HD1 and HD2. Orthostatic vitals were monitored each day, and the degree of drop in SBP going from laying to sitting continued to improve. Notably, on echo her CVP was 3 mmHg, consistent with intravascular volume depletion. No signs of acute decompensated heart failure (SOB, leg swelling). She continued to feel lightheaded with going from laying to sitting. With ongoing fluid resuscitation and one dose of fludrocortisone, the lightheadedness with sitting/standing eventually resolved and SBP only dropped 4 mmHg going from laying to sitting. Dehydration and intravascular volume depletion is most likely etiology for orthostasis, in setting of recent hospitalization (ERCP and sphincterotomy for gallstone  pancreatitis) and diuretic use outpatient, and likely reduced PO intake. With ongoing fluid resuscitation, Hgb slowly downtrended along with WBC and platelet count. Consistent with hemodilution. No signs of bleeding. Continued to monitor CBC. Fludricortisone was discontinued and began to reintroduce home meds, starting with Metoprolol. [place] she has a history of orthostatic hypotension in the past that resolved after stopping Losartan, however Losartan was resumed after most recent hospitalization. Will discontinue this medication. Patient has been feeling fine overall and stable to go back to SNF.    Interval History: kristofer nowak, felt some uneasiness over night but feels better this morning. Doing fine on metoprolol 50 mg will keep it and will not resume losartan. Likely will be discharged to SNF tomorrow.     Review of Systems  Objective:     Vital Signs (Most Recent):  Temp: 98 °F (36.7 °C) (09/01/24 1147)  Pulse: 81 (09/01/24 1147)  Resp: 18 (09/01/24 1147)  BP: 135/62 (09/01/24 1147)  SpO2: 100 % (09/01/24 1147) Vital Signs (24h Range):  Temp:  [97.7 °F (36.5 °C)-98.3 °F (36.8 °C)] 98 °F (36.7 °C)  Pulse:  [72-88] 81  Resp:  [15-23] 18  SpO2:  [90 %-100 %] 100 %  BP: (127-163)/(60-76) 135/62     Weight: 70.5 kg (155 lb 6.8 oz)  Body mass index is 26.68 kg/m².    Intake/Output Summary (Last 24 hours) at 9/1/2024 1218  Last data filed at 9/1/2024 0820  Gross per 24 hour   Intake 725 ml   Output 925 ml   Net -200 ml         Physical Exam      Vitals and nursing note reviewed.   Constitutional:       Appearance: Normal appearance.   HENT:      Head: Normocephalic and atraumatic.      Right Ear: External ear normal.      Left Ear: External ear normal.      Nose: Nose normal.      Mouth/Throat:      Mouth: Mucous membranes are moist.   Eyes:      Pupils: Pupils are equal, round, and reactive to light.   Cardiovascular:      Rate and Rhythm: Normal rate and regular rhythm.      Pulses: Normal pulses.      Heart  sounds: Normal heart sounds.   Pulmonary:      Effort: Pulmonary effort is normal. No respiratory distress.      Breath sounds: Normal breath sounds. No wheezing.   Abdominal:      Palpations: Abdomen is soft.   Musculoskeletal:      Cervical back: Normal range of motion.      Right lower leg: No edema.      Left lower leg: No edema.   Skin:     General: Skin is warm.   Neurological:      General: No focal deficit present.      Mental Status: She is alert and oriented to person, place, and time.   Psychiatric:         Mood and Affect: Mood normal.         Behavior: Behavior normal.         Thought Content: Thought content normal.         Judgment: Judgment normal.   Significant Labs: All pertinent labs within the past 24 hours have been reviewed.    Significant Imaging: I have reviewed all pertinent imaging results/findings within the past 24 hours.    Assessment/Plan:      * Orthostatic hypotension  Ms. Barajas is an 78 year old female with history as above who presents from her nursing home for syncopal episode while taking a shower at her nursing home. Per her daughter, patient has had periods of lightheadedness over the past 3 weeks, since her ERCP w/ sphincterotomy. States her blood pressure has been low in the SNF despite midodrine treatment. Patient states she had no pro-dromal or post syncopal symptoms. EKG w/ 1st degree AVB, unchanged from previous. She was not taking any BP meds as the nursing home was holding them. Differentials include orthostasis, cardiac related given history of aflutter s/p ablation, neurologic related, drug induced although less likely as nursing home was holding her blood pressure medications and she does not take buspar. Positive orthostatic vitals in the ED and again on 8/28 after receiving 2L volume (1L IVF and 1 unit pRBC). CT head negative for any acute intracranial abnormalities. Echo shows normal systolic function (EF 60-65%) and normal diastolic function. EKG shows a  bifascicular block, however this is similar to EKG 1 year ago prior to the onset of dizziness/lightheadedness. Autonomic neuropathy on differential as a cause of orthostatic hypotension. Got additional 1L IVF and started on fludricortisone. Orthostatic vitals repeated: 131/91 to 126/60 (sitting). Patient no longer lightheaded sitting or standing. Unable to collect standing vitals due to leg weakness/deconditioning.    Per daughter, and confirmed with chart check, patient had orthostatic hypotension in May of this year. Losartan was held and she did not have any dizziness in May, June, or July. During recent admission in July for pancreatitis, Losartan was restarted due to hypertension (this was also in setting of pain) and since that time the dizziness and orthostasis have returned.     Plan:  - Resume metoprolol at low dose  -D/C losartan  - continue telemetry  - can consider tilt table testing w/ EP as outpatient if problem persists  - Stop Fludrocortisone          Acute blood loss anemia  Appears her baseline was 10-11 over the past couple of months and has down trended to 8.2 on admission. Per ED there was blood noted on ZAHEER and concern for oozing from prior sphincterotomy site done on 7/30 given she is also on Eliquis. She received 1U PRBC in the ED. Hgb has remained stable on repeat CBC. No additional blood noted. GI consulted and recommended PPI and continued CBC trending, but no plans for endoscopy. Per GI, ok to resume eliquis. Hgb has remained overall stable. There have been slight drops (9.8 > 9 > 8.9) following several liters of IVF rehydration with associated drops in WBC and platelet count, representative of hemodilution. Additionally, no signs of active bleeding.     - PPI: protonix 40 mg BID  - Continue to monitor CBC    Hypercoagulability due to atrial fibrillation  S/p ablation with EP. Has follow-up with Dr. Church in September. At that time, Dr. Church will determine whether she needs to be on  Eliquis. Currently on metoprolol and Eliquis.   [id number] Score: 6. EKG with 1st degree avb and rbbb seen on previous EKGs. No afib/flutter seen on telemetry. Eliquis initially held in the ED due to mild anemia, however no longer concerned for an acute bleed. No Afib or flutter on EKG or telemetry throughout this hospital stay.    Plan  - monitor on telemetry  - Resumed home Eliquis 2.5 BID  - F/u w/ Dr. Church, EP, next month      Diastolic CHF, chronic  Noted history. On lasix 40 at home.  Echo 8/27: EF 60-65%, normal systolic function, normal diastolic function, PASP 34 mmHg, CVP 3. No overt signs of fluid overload on exam.    Plan:  - assess volume status daily  - Hold furosemide  -D/C losartan      COPD (chronic obstructive pulmonary disease)  Patient's COPD is controlled currently.  Patient is currently off COPD Pathway. Continue scheduled inhalers and monitor respiratory status closely.     - On home 2L, no wheezing on my exam  - CXR on admission  - Breo with prn albuterol while inpatient    Chronic respiratory failure with hypoxia  Currently on 2L home oxygen for COPD. Saturating >96%.    CKD (chronic kidney disease) stage 3, GFR 30-59 ml/min  Creatine stable for now. BMP reviewed- noted Estimated Creatinine Clearance: 41 mL/min (based on SCr of 1.1 mg/dL). according to latest data. Based on current GFR, CKD stage is stage 3 - GFR 30-59.  Monitor UOP and serial BMP and adjust therapy as needed. Renally dose meds. Avoid nephrotoxic medications and procedures.        ACP (advance care planning)  Patient made DNR in the ED. I confirmed with patient and her daughter about code status. They have prior paperwork signed, not in our system.      HTN (hypertension)  Home regimen: Losartan 50 mg qd, Metoprolol succinate. According to pt's daughter, she was off of both medications in May, June, and July and felt well. Once these medications were re-started during recent hospitalization, she began to feel lightheaded  and dizzy. Consider stopping anti-hypertensive medications due to symptomatic orthostatic hypotension.     Plan  - Holding Losartan in setting of orthostatic hypotension. Will discontinue this medication indefinitely.   - Decreased Metoprolol Succinate from 100 mg to 50 mg qd      VTE Risk Mitigation (From admission, onward)           Ordered     apixaban tablet 5 mg  2 times daily         08/27/24 1454     Reason for No Pharmacological VTE Prophylaxis  Once        Question:  Reasons:  Answer:  Active Bleeding    08/26/24 2051     IP VTE HIGH RISK PATIENT  Once         08/26/24 2051     Place sequential compression device  Until discontinued         08/26/24 2033                    Discharge Planning   NOHEMI: 9/2/2024     Code Status: DNR   Is the patient medically ready for discharge?:     Reason for patient still in hospital (select all that apply): Patient trending condition  Discharge Plan A: Skilled Nursing Facility                  Neli Wood MD  Department of Hospital Medicine   [facility] - [facility] Step[facility]

## 2024-09-01 NOTE — PLAN OF CARE
Problem: Adult Inpatient Plan of Care  Goal: Plan of Care Review  Outcome: Progressing  Goal: Absence of Hospital-Acquired Illness or Injury  Outcome: Progressing  Goal: Readiness for Transition of Care  Outcome: Progressing     Problem: Wound  Goal: Optimal Coping  Outcome: Progressing     Problem: Fall Injury Risk  Goal: Absence of Fall and Fall-Related Injury  Outcome: Progressing    AAOX4,VSS,O2 sats > 90% on 2L NC. Plan of care discussed with patient. Patient has no complaints of chest pain/SOB/palpitations. Pt verbalized no appetite. Encouraged to eat. Pt drank boost x1.  fall precautions in place,no falls/injuries through the shift.Discussed medications and care.Patient has no questions at this time.Pt resting comfortably with no acute distress.Call light within reach,bed at lowest position.

## 2024-09-01 NOTE — ASSESSMENT & PLAN NOTE
Ms. Barajas is an 78 year old female with history as above who presents from her nursing home for syncopal episode while taking a shower at her nursing home. Per her daughter, patient has had periods of lightheadedness over the past 3 weeks, since her ERCP w/ sphincterotomy. States her blood pressure has been low in the SNF despite midodrine treatment. Patient states she had no pro-dromal or post syncopal symptoms. EKG w/ 1st degree AVB, unchanged from previous. She was not taking any BP meds as the nursing home was holding them. Differentials include orthostasis, cardiac related given history of aflutter s/p ablation, neurologic related, drug induced although less likely as nursing home was holding her blood pressure medications and she does not take buspar. Positive orthostatic vitals in the ED and again on 8/28 after receiving 2L volume (1L IVF and 1 unit pRBC). CT head negative for any acute intracranial abnormalities. Echo shows normal systolic function (EF 60-65%) and normal diastolic function. EKG shows a bifascicular block, however this is similar to EKG 1 year ago prior to the onset of dizziness/lightheadedness. Autonomic neuropathy on differential as a cause of orthostatic hypotension. Got additional 1L IVF and started on fludricortisone. Orthostatic vitals repeated: 131/91 to 126/60 (sitting). Patient no longer lightheaded sitting or standing. Unable to collect standing vitals due to leg weakness/deconditioning.    Per daughter, and confirmed with chart check, patient had orthostatic hypotension in May of this year. Losartan was held and she did not have any dizziness in May, June, or July. During recent admission in July for pancreatitis, Losartan was restarted due to hypertension (this was also in setting of pain) and since that time the dizziness and orthostasis have returned.     Plan:  - Resume metoprolol at low dose  -D/C losartan  - continue telemetry  - can consider tilt table testing w/ EP as  outpatient if problem persists  - Stop Fludrocortisone

## 2024-09-02 LAB
ALBUMIN SERPL BCP-MCNC: 1.9 G/DL (ref 3.5–5.2)
ALP SERPL-CCNC: 114 U/L (ref 55–135)
ALT SERPL W/O P-5'-P-CCNC: 16 U/L (ref 10–44)
ANION GAP SERPL CALC-SCNC: 5 MMOL/L (ref 8–16)
AST SERPL-CCNC: 25 U/L (ref 10–40)
BASOPHILS # BLD AUTO: 0.06 K/UL (ref 0–0.2)
BASOPHILS NFR BLD: 0.6 % (ref 0–1.9)
BILIRUB SERPL-MCNC: 0.7 MG/DL (ref 0.1–1)
BUN SERPL-MCNC: 17 MG/DL (ref 8–23)
CALCIUM SERPL-MCNC: 9.8 MG/DL (ref 8.7–10.5)
CHLORIDE SERPL-SCNC: 112 MMOL/L (ref 95–110)
CO2 SERPL-SCNC: 23 MMOL/L (ref 23–29)
CREAT SERPL-MCNC: 1.1 MG/DL (ref 0.5–1.4)
DIFFERENTIAL METHOD BLD: ABNORMAL
EOSINOPHIL # BLD AUTO: 0.4 K/UL (ref 0–0.5)
EOSINOPHIL NFR BLD: 3.9 % (ref 0–8)
ERYTHROCYTE [DISTWIDTH] IN BLOOD BY AUTOMATED COUNT: 17.5 % (ref 11.5–14.5)
EST. GFR  (NO RACE VARIABLE): 50.8 ML/MIN/1.73 M^2
FOLATE SERPL-MCNC: 11.7 NG/ML (ref 4–24)
GLUCOSE SERPL-MCNC: 97 MG/DL (ref 70–110)
HCT VFR BLD AUTO: 29.3 % (ref 37–48.5)
HGB BLD-MCNC: 9 G/DL (ref 12–16)
IMM GRANULOCYTES # BLD AUTO: 0.03 K/UL (ref 0–0.04)
IMM GRANULOCYTES NFR BLD AUTO: 0.3 % (ref 0–0.5)
LYMPHOCYTES # BLD AUTO: 2 K/UL (ref 1–4.8)
LYMPHOCYTES NFR BLD: 20.8 % (ref 18–48)
MAGNESIUM SERPL-MCNC: 2 MG/DL (ref 1.6–2.6)
MCH RBC QN AUTO: 28.7 PG (ref 27–31)
MCHC RBC AUTO-ENTMCNC: 30.7 G/DL (ref 32–36)
MCV RBC AUTO: 93 FL (ref 82–98)
MONOCYTES # BLD AUTO: 1.1 K/UL (ref 0.3–1)
MONOCYTES NFR BLD: 11 % (ref 4–15)
NEUTROPHILS # BLD AUTO: 6.1 K/UL (ref 1.8–7.7)
NEUTROPHILS NFR BLD: 63.4 % (ref 38–73)
NRBC BLD-RTO: 0 /100 WBC
PHOSPHATE SERPL-MCNC: 1.7 MG/DL (ref 2.7–4.5)
PLATELET # BLD AUTO: 251 K/UL (ref 150–450)
PMV BLD AUTO: 10.2 FL (ref 9.2–12.9)
POTASSIUM SERPL-SCNC: 3.7 MMOL/L (ref 3.5–5.1)
PROT SERPL-MCNC: 5.2 G/DL (ref 6–8.4)
RBC # BLD AUTO: 3.14 M/UL (ref 4–5.4)
RETICS/RBC NFR AUTO: 2.7 % (ref 0.5–2.5)
SODIUM SERPL-SCNC: 140 MMOL/L (ref 136–145)
WBC # BLD AUTO: 9.56 K/UL (ref 3.9–12.7)

## 2024-09-02 PROCEDURE — 80053 COMPREHEN METABOLIC PANEL: CPT | Performed by: STUDENT IN AN ORGANIZED HEALTH CARE EDUCATION/TRAINING PROGRAM

## 2024-09-02 PROCEDURE — 94761 N-INVAS EAR/PLS OXIMETRY MLT: CPT

## 2024-09-02 PROCEDURE — 85045 AUTOMATED RETICULOCYTE COUNT: CPT | Performed by: HOSPITALIST

## 2024-09-02 PROCEDURE — 27000221 HC OXYGEN, UP TO 24 HOURS

## 2024-09-02 PROCEDURE — 85025 COMPLETE CBC W/AUTO DIFF WBC: CPT | Performed by: HOSPITALIST

## 2024-09-02 PROCEDURE — 25000003 PHARM REV CODE 250

## 2024-09-02 PROCEDURE — 83735 ASSAY OF MAGNESIUM: CPT | Performed by: STUDENT IN AN ORGANIZED HEALTH CARE EDUCATION/TRAINING PROGRAM

## 2024-09-02 PROCEDURE — 84100 ASSAY OF PHOSPHORUS: CPT | Performed by: STUDENT IN AN ORGANIZED HEALTH CARE EDUCATION/TRAINING PROGRAM

## 2024-09-02 PROCEDURE — 36415 COLL VENOUS BLD VENIPUNCTURE: CPT | Performed by: HOSPITALIST

## 2024-09-02 PROCEDURE — 99900035 HC TECH TIME PER 15 MIN (STAT)

## 2024-09-02 PROCEDURE — 25000003 PHARM REV CODE 250: Performed by: INTERNAL MEDICINE

## 2024-09-02 PROCEDURE — 63600175 PHARM REV CODE 636 W HCPCS

## 2024-09-02 PROCEDURE — 94640 AIRWAY INHALATION TREATMENT: CPT

## 2024-09-02 PROCEDURE — 82746 ASSAY OF FOLIC ACID SERUM: CPT | Performed by: HOSPITALIST

## 2024-09-02 PROCEDURE — 20600001 HC STEP DOWN PRIVATE ROOM

## 2024-09-02 RX ORDER — FUROSEMIDE 40 MG/1
40 TABLET ORAL DAILY
Status: DISCONTINUED | OUTPATIENT
Start: 2024-09-03 | End: 2024-09-03 | Stop reason: HOSPADM

## 2024-09-02 RX ORDER — SODIUM,POTASSIUM PHOSPHATES 280-250MG
2 POWDER IN PACKET (EA) ORAL EVERY 4 HOURS
Status: DISPENSED | OUTPATIENT
Start: 2024-09-02 | End: 2024-09-02

## 2024-09-02 RX ORDER — PANTOPRAZOLE SODIUM 40 MG/1
40 TABLET, DELAYED RELEASE ORAL
Start: 2024-09-02 | End: 2025-09-02

## 2024-09-02 RX ORDER — FUROSEMIDE 10 MG/ML
80 INJECTION INTRAMUSCULAR; INTRAVENOUS ONCE
Status: COMPLETED | OUTPATIENT
Start: 2024-09-02 | End: 2024-09-02

## 2024-09-02 RX ORDER — METOPROLOL SUCCINATE 50 MG/1
50 TABLET, EXTENDED RELEASE ORAL DAILY
Start: 2024-09-03 | End: 2025-09-03

## 2024-09-02 RX ORDER — FUROSEMIDE 10 MG/ML
80 INJECTION INTRAMUSCULAR; INTRAVENOUS ONCE
Status: DISCONTINUED | OUTPATIENT
Start: 2024-09-02 | End: 2024-09-02

## 2024-09-02 RX ADMIN — TRAZODONE HYDROCHLORIDE 25 MG: 50 TABLET ORAL at 08:09

## 2024-09-02 RX ADMIN — TIOTROPIUM BROMIDE INHALATION SPRAY 2 PUFF: 3.12 SPRAY, METERED RESPIRATORY (INHALATION) at 09:09

## 2024-09-02 RX ADMIN — HYDROXYZINE HYDROCHLORIDE 25 MG: 25 TABLET ORAL at 12:09

## 2024-09-02 RX ADMIN — APIXABAN 5 MG: 5 TABLET, FILM COATED ORAL at 08:09

## 2024-09-02 RX ADMIN — FUROSEMIDE 80 MG: 10 INJECTION, SOLUTION INTRAVENOUS at 09:09

## 2024-09-02 RX ADMIN — PANTOPRAZOLE SODIUM 40 MG: 40 TABLET, DELAYED RELEASE ORAL at 03:09

## 2024-09-02 RX ADMIN — FLUTICASONE FUROATE AND VILANTEROL TRIFENATATE 1 PUFF: 100; 25 POWDER RESPIRATORY (INHALATION) at 09:09

## 2024-09-02 RX ADMIN — POTASSIUM & SODIUM PHOSPHATES POWDER PACK 280-160-250 MG 2 PACKET: 280-160-250 PACK at 01:09

## 2024-09-02 RX ADMIN — POTASSIUM & SODIUM PHOSPHATES POWDER PACK 280-160-250 MG 2 PACKET: 280-160-250 PACK at 09:09

## 2024-09-02 RX ADMIN — METOPROLOL SUCCINATE 50 MG: 50 TABLET, EXTENDED RELEASE ORAL at 08:09

## 2024-09-02 RX ADMIN — PANTOPRAZOLE SODIUM 40 MG: 40 TABLET, DELAYED RELEASE ORAL at 06:09

## 2024-09-02 NOTE — ASSESSMENT & PLAN NOTE
Ms. Barajas is an 78 year old female with history as above who presents from her nursing home for syncopal episode while taking a shower at her nursing home. Per her daughter, patient has had periods of lightheadedness over the past 3 weeks, since her ERCP w/ sphincterotomy. States her blood pressure has been low in the SNF despite midodrine treatment. Patient states she had no pro-dromal or post syncopal symptoms. EKG w/ 1st degree AVB, unchanged from previous. She was not taking any BP meds as the nursing home was holding them. Differentials include orthostasis, cardiac related given history of aflutter s/p ablation, neurologic related, drug induced although less likely as nursing home was holding her blood pressure medications and she does not take buspar. Positive orthostatic vitals in the ED and again on 8/28 after receiving 2L volume (1L IVF and 1 unit pRBC). CT head negative for any acute intracranial abnormalities. Echo shows normal systolic function (EF 60-65%) and normal diastolic function. EKG shows a bifascicular block, however this is similar to EKG 1 year ago prior to the onset of dizziness/lightheadedness. Autonomic neuropathy on differential as a cause of orthostatic hypotension. Got additional 1L IVF and started on fludricortisone. Orthostatic vitals repeated: 131/91 to 126/60 (sitting). Patient no longer lightheaded sitting or standing. Unable to collect standing vitals due to leg weakness/deconditioning.    Per daughter, and confirmed with chart check, patient had orthostatic hypotension in May of this year. Losartan was held and she did not have any dizziness in May, June, or July. During recent admission in July for pancreatitis, Losartan was restarted due to hypertension (this was also in setting of pain) and since that time the dizziness and orthostasis have returned.     Plan:  -D/C losartan indefinitely   - Continue low dose Metoprolol succinate 50 mg   - continue telemetry

## 2024-09-02 NOTE — PROGRESS NOTES
"[facility] - Cardiology [facility] Medicine  Progress Note    Patient Name: Misa Barajas  MRN: [id number]  Patient Class: IP- Inpatient   Admission Date: 2024  Length of Stay: 6 days  Attending Physician: Talha Perdomo MD  Primary Care Provider: Celia Carlisle MD (Inactive)        Subjective:     Principal Problem:Orthostatic hypotension        HPI:  Ms. Barajas is a 78 year old female with a history of COPD chronically on 2L, CKD, HFpEF, remote hx of breast cx s/p chemo c/b polyneuropathy, paroxysmal Afib/flutter who presents from [facility] for new onset dizziness that started around 7am this morning. Patient states she was taking a shower with her nursing aid this morning when she acutely began feeling like she was about to pass out. She denies that the room was spinning but instead endorses lightheadedness. She states she may have passed out for 1-2 minutes as per her nursing aid and remembers being held up by the nursing home staff. She denies any chest pain, palpitations, dyspnea, or any other symptoms prior to becoming dizzy. She denies any post-syncopal symptoms as well. She currently states she feels fine but may become "dizzy" again if she were to stand up. She also denies any new symptoms including rashes, wounds, dysuria, hematuria, hematemesis, abdominal pain, fever, chills. She is unsure if she has melena as nursing home staff changes her diapers. She states this has never happened in the past.     She has had multiple admissions over the last couple of months includin/7- HF exacerbation, AIN, E.faecalis UTI.   - admission for cellulitis and pastruella bacteremia s/p 2 week treatment w/ unaysyn and doxycline.  - admission for gallstone pancreatitis w/ possible early cholecystitis s/p ERCP w/ sphincterotomy on .     I spoke with the patient's daughter over the phone [place]. She states the patient began having symptoms of dizziness around 3 weeks " ago. She noticed it began after her ERCP w/ sphincterotomy. While in rehab the daughter states patient has been persistently orthostatic with low blood pressures and dizziness with standing. The SNF attempted to use increasing doses midodrine and meclizine however she continued to be dizzy and orthostatic.     In the ED patient was noted to be orthostatic with /62 HR 82 when lying and  w/ BP 65/35 when standing. Lactic acid was 1.6, labs otherwise remarkable for downtrending Hgb 10->8. She was started on 1U PRBC and fluids and admitted for further work-up.              Overview/Hospital Course:  Misa Barajas is a 78 y.o. F admitted to hospital medicine for syncope and orthostatic hypotension. GI was consulted for symptomatic anemia. CBC was trended and remained stable after 1unit pRBC. There was no melena, hematochezia, hematemesis, or other signs of active bleeding. GI recommended PPI, but no endoscopy. Telemetry monitoring did not show any afib or flutter. EKG did reveal a bifascicular block. Cardiology curbsided, and bifascicular block felt very unlikely to be contributing to lightheadeness/syncope. Home antihypertensives were held. Infectious workup negative. She received additional 1L IVF on HD1 and HD2. Orthostatic vitals were monitored each day, and the degree of drop in SBP going from laying to sitting continued to improve. Notably, on echo her CVP was 3 mmHg, consistent with intravascular volume depletion. No signs of acute decompensated heart failure (SOB, leg swelling). She continued to feel lightheaded with going from laying to sitting. With ongoing fluid resuscitation and one dose of fludrocortisone, the lightheadedness with sitting/standing eventually resolved and SBP only dropped 4 mmHg going from laying to sitting. Dehydration and intravascular volume depletion is most likely etiology for orthostasis, in setting of recent hospitalization (ERCP and sphincterotomy for gallstone  pancreatitis) and diuretic use outpatient, and likely reduced PO intake. With ongoing fluid resuscitation, Hgb slowly downtrended along with WBC and platelet count. Consistent with hemodilution. No signs of bleeding. Continued to monitor CBC. Fludricortisone was discontinued and began to reintroduce home meds, starting with Metoprolol. [place] she has a history of orthostatic hypotension in the past that resolved after stopping Losartan, however Losartan was resumed after most recent hospitalization. Will discontinue this medication. Patient has been feeling fine overall and stable to go back to SNF.    Interval History: No acute events overnight. Pt reports she woke from sleep with shortness of breath, which made her very anxious. She got PRN vistaril which made her feel better. This morning she states she is short of breath when lying flat. No chest pain or palpitations.     Objective:     Vital Signs (Most Recent):  Temp: 98.5 °F (36.9 °C) (09/02/24 0805)  Pulse: 88 (09/02/24 0805)  Resp: 16 (09/02/24 0805)  BP: (!) 155/70 (09/02/24 0806)  SpO2: (!) 92 % (09/02/24 0805) Vital Signs (24h Range):  Temp:  [97.6 °F (36.4 °C)-99 °F (37.2 °C)] 98.5 °F (36.9 °C)  Pulse:  [70-96] 88  Resp:  [15-18] 16  SpO2:  [92 %-100 %] 92 %  BP: (135-164)/(62-76) 155/70     Weight: 70.5 kg (155 lb 6.8 oz)  Body mass index is 26.68 kg/m².    Intake/Output Summary (Last 24 hours) at 9/2/2024 0836  Last data filed at 9/2/2024 0434  Gross per 24 hour   Intake 287 ml   Output 1000 ml   Net -713 ml         Physical Exam  Vitals and nursing note reviewed.   Constitutional:       Appearance: Normal appearance.   HENT:      Head: Normocephalic and atraumatic.      Right Ear: External ear normal.      Left Ear: External ear normal.      Nose: Nose normal.      Mouth/Throat:      Mouth: Mucous membranes are moist.   Eyes:      Pupils: Pupils are equal, round, and reactive to light.   Cardiovascular:      Rate and Rhythm: Normal rate and regular  rhythm.   Pulmonary:      Effort: Pulmonary effort is normal.      Breath sounds: Rales present.   Abdominal:      Palpations: Abdomen is soft.   Musculoskeletal:      Cervical back: Normal range of motion.      Right lower leg: Edema present.      Left lower leg: Edema present.   Neurological:      General: No focal deficit present.      Mental Status: She is alert and oriented to person, place, and time.   Psychiatric:         Mood and Affect: Mood normal.         Behavior: Behavior normal.         Thought Content: Thought content normal.         Judgment: Judgment normal.             Significant Labs: All pertinent labs within the past 24 hours have been reviewed.    Significant Imaging: I have reviewed all pertinent imaging results/findings within the past 24 hours.    Assessment/Plan:      * Orthostatic hypotension  Ms. Barajas is an 78 year old female with history as above who presents from her nursing home for syncopal episode while taking a shower at her nursing home. Per her daughter, patient has had periods of lightheadedness over the past 3 weeks, since her ERCP w/ sphincterotomy. States her blood pressure has been low in the SNF despite midodrine treatment. Patient states she had no pro-dromal or post syncopal symptoms. EKG w/ 1st degree AVB, unchanged from previous. She was not taking any BP meds as the nursing home was holding them. Differentials include orthostasis, cardiac related given history of aflutter s/p ablation, neurologic related, drug induced although less likely as nursing home was holding her blood pressure medications and she does not take buspar. Positive orthostatic vitals in the ED and again on 8/28 after receiving 2L volume (1L IVF and 1 unit pRBC). CT head negative for any acute intracranial abnormalities. Echo shows normal systolic function (EF 60-65%) and normal diastolic function. EKG shows a bifascicular block, however this is similar to EKG 1 year ago prior to the onset of  dizziness/lightheadedness. Autonomic neuropathy on differential as a cause of orthostatic hypotension. Got additional 1L IVF and started on fludricortisone. Orthostatic vitals repeated: 131/91 to 126/60 (sitting). Patient no longer lightheaded sitting or standing. Unable to collect standing vitals due to leg weakness/deconditioning.    Per daughter, and confirmed with chart check, patient had orthostatic hypotension in May of this year. Losartan was held and she did not have any dizziness in May, June, or July. During recent admission in July for pancreatitis, Losartan was restarted due to hypertension (this was also in setting of pain) and since that time the dizziness and orthostasis have returned.     Plan:  -D/C losartan indefinitely   - Continue low dose Metoprolol succinate 50 mg   - continue telemetry        Acute blood loss anemia  Appears her baseline was 10-11 over the past couple of months and has down trended to 8.2 on admission. Per ED there was blood noted on ZAHEER and concern for oozing from prior sphincterotomy site done on 7/30 given she is also on Eliquis. She received 1U PRBC in the ED. Hgb has remained stable on repeat CBC. No additional blood noted. GI consulted and recommended PPI and continued CBC trending, but no plans for endoscopy. Per GI, ok to resume eliquis. Hgb has remained overall stable. There have been slight drops (9.8 > 9 > 8.9) following several liters of IVF rehydration with associated drops in WBC and platelet count, representative of hemodilution. Additionally, no signs of active bleeding. Hgb stable.     - PPI: protonix 40 mg BID  - Follow up with GI outpatient    Hypercoagulability due to atrial fibrillation  S/p ablation with EP. Has follow-up with Dr. Church in September. At that time, Dr. Church will determine whether she needs to be on Eliquis. Currently on metoprolol and Eliquis.   [id number] Score: 6. EKG with 1st degree avb and rbbb seen on previous EKGs. No  afib/flutter seen on telemetry. Eliquis initially held in the ED due to mild anemia, however no longer concerned for an acute bleed. No Afib or flutter on EKG or telemetry throughout this hospital stay.    Plan  - monitor on telemetry  - Resumed home Eliquis 2.5 BID  - F/u w/ Dr. Church EP, next month      Diastolic CHF, chronic  Noted history. On lasix 40 at home.  Echo 8/27: EF 60-65%, normal systolic function, normal diastolic function, PASP 34 mmHg, CVP 3. Volume overload on exam today (bilateral LE pitting edema, crackles)    Plan:  - assess volume status daily  - Furosemide IV 80 mg x 1 today  - Restart home PO furosemide 40 mg tomorrow        COPD (chronic obstructive pulmonary disease)  Patient's COPD is controlled currently.  Patient is currently off COPD Pathway. Continue scheduled inhalers and monitor respiratory status closely.     - On home 2L, no wheezing on my exam  - CXR on admission  - Breo with prn albuterol while inpatient    Chronic respiratory failure with hypoxia  Currently on 2L home oxygen for COPD. Saturating >96%.    CKD (chronic kidney disease) stage 3, GFR 30-59 ml/min  Creatine stable for now. BMP reviewed- noted Estimated Creatinine Clearance: 41 mL/min (based on SCr of 1.1 mg/dL). according to latest data. Based on current GFR, CKD stage is stage 3 - GFR 30-59.  Monitor UOP and serial BMP and adjust therapy as needed. Renally dose meds. Avoid nephrotoxic medications and procedures.        ACP (advance care planning)  Patient made DNR in the ED. I confirmed with patient and her daughter about code status. They have prior paperwork signed, not in our system.      HTN (hypertension)  Home regimen: Losartan 50 mg qd, Metoprolol succinate. According to pt's daughter, she was off of both medications in May, June, and July and felt well. Once these medications were re-started during recent hospitalization, she began to feel lightheaded and dizzy. Consider stopping anti-hypertensive  medications due to symptomatic orthostatic hypotension.     Plan  - Discontinue Losartan indefinitely due to orthostatic hypotension  - Metoprolol Succinate 50 mg qd      VTE Risk Mitigation (From admission, onward)           Ordered     apixaban tablet 5 mg  2 times daily         08/27/24 1454     Reason for No Pharmacological VTE Prophylaxis  Once        Question:  Reasons:  Answer:  Active Bleeding    08/26/24 2051     IP VTE HIGH RISK PATIENT  Once         08/26/24 2051     Place sequential compression device  Until discontinued         08/26/24 2033                    Discharge Planning   NOHEMI: 9/2/2024     Code Status: DNR   Is the patient medically ready for discharge?:     Reason for patient still in hospital (select all that apply): Pending disposition  Discharge Plan A: Skilled Nursing Facility                  Lucero Post MD  Department of Hospital Medicine   [facility] - Cardiology Stepdown

## 2024-09-02 NOTE — NURSING
Pt c/o SOB. O2 sat at 92% on 2L via NC (COPD). Atarax PRN given. Family members concerns conveyed to Dr. Post and at the bedside.

## 2024-09-02 NOTE — ASSESSMENT & PLAN NOTE
Home regimen: Losartan 50 mg qd, Metoprolol succinate. According to pt's daughter, she was off of both medications in May, June, and July and felt well. Once these medications were re-started during recent hospitalization, she began to feel lightheaded and dizzy. Consider stopping anti-hypertensive medications due to symptomatic orthostatic hypotension.     Plan  - Discontinue Losartan indefinitely due to orthostatic hypotension  - Metoprolol Succinate 50 mg qd

## 2024-09-02 NOTE — PLAN OF CARE
Plan of care reviewed with patient   Call bell within reach, advised on fall precautions.   Observed for chest discomfort, verbalized, chest discomfort much lesser than yesterday.      Problem: Adult Inpatient Plan of Care  Goal: Plan of Care Review  Outcome: Progressing  Goal: Patient-Specific Goal (Individualized)  Outcome: Progressing  Goal: Absence of Hospital-Acquired Illness or Injury  Outcome: Progressing  Goal: Optimal Comfort and Wellbeing  Outcome: Progressing  Goal: Readiness for Transition of Care  Outcome: Progressing     Problem: Wound  Goal: Optimal Coping  Outcome: Progressing  Goal: Optimal Functional Ability  Outcome: Progressing  Goal: Absence of Infection Signs and Symptoms  Outcome: Progressing  Goal: Improved Oral Intake  Outcome: Progressing  Goal: Optimal Pain Control and Function  Outcome: Progressing  Goal: Skin Health and Integrity  Outcome: Progressing  Goal: Optimal Wound Healing  Outcome: Progressing     Problem: Skin Injury Risk Increased  Goal: Skin Health and Integrity  Outcome: Progressing     Problem: Fall Injury Risk  Goal: Absence of Fall and Fall-Related Injury  Outcome: Progressing

## 2024-09-02 NOTE — ASSESSMENT & PLAN NOTE
Appears her baseline was 10-11 over the past couple of months and has down trended to 8.2 on admission. Per ED there was blood noted on ZAHEER and concern for oozing from prior sphincterotomy site done on 7/30 given she is also on Eliquis. She received 1U PRBC in the ED. Hgb has remained stable on repeat CBC. No additional blood noted. GI consulted and recommended PPI and continued CBC trending, but no plans for endoscopy. Per GI, ok to resume eliquis. Hgb has remained overall stable. There have been slight drops (9.8 > 9 > 8.9) following several liters of IVF rehydration with associated drops in WBC and platelet count, representative of hemodilution. Additionally, no signs of active bleeding. Hgb stable.     - PPI: protonix 40 mg BID  - Follow up with GI outpatient

## 2024-09-02 NOTE — ASSESSMENT & PLAN NOTE
Noted history. On lasix 40 at home.  Echo 8/27: EF 60-65%, normal systolic function, normal diastolic function, PASP 34 mmHg, CVP 3. Volume overload on exam today (bilateral LE pitting edema, crackles)    Plan:  - assess volume status daily  - Furosemide IV 80 mg x 1 today  - Restart home PO furosemide 40 mg tomorrow

## 2024-09-02 NOTE — PLAN OF CARE
NURSING HOME ORDERS    09/03/2024  [facility]  [facility] - CARDIOLOGY STEPDOWN  [address]  [facility] [address]  Dept: [phone]  Loc: [phone]     Admit to Nursing Home:  Skilled Nursing Facility    Diagnoses:  Active Hospital Problems    Diagnosis  POA    *Orthostatic hypotension [I95.1]  Yes     Priority: 1 - High    Acute blood loss anemia [D62]  Yes     POA, symptomatic, s/p transfusion      Hypercoagulability due to atrial fibrillation [D68.69, I48.91]  Yes    Diastolic CHF, chronic [I50.32]  Yes    COPD (chronic obstructive pulmonary disease) [J44.9]  Yes    Chronic respiratory failure with hypoxia [J96.11]  Yes     Chronic     POA  Home O2- 2L      CKD (chronic kidney disease) stage 3, GFR 30-59 ml/min [N18.30]  Yes     Chronic    ACP (advance care planning) [Z71.89]  Not Applicable    HTN (hypertension) [I10]  Yes      Resolved Hospital Problems   No resolved problems to display.       Patient is homebound due to:  Orthostatic hypotension, deconditioning from several recent acute illnesses     Allergies:  Review of patient's allergies indicates:   Allergen Reactions    Adhesive tape-silicones     Adhesive Rash     Pt states she removed a LATEX bandaid and the skin beneath was swollen and red. No other latex causes a reaction.       Vitals:  Routine    Diet: regular diet    Activities:   Activity as tolerated    Goals of Care Treatment Preferences:  Code Status: DNR      Labs:  none    Nursing Precautions:  Fall    Consults:   PT to evaluate and treat- 5 times a week, OT to evaluate and treat- 5 times a week, and Nutrition to evaluate and recommend diet     Miscellaneous Care:   Oxygen, 2L nasal cannula, continuous O2. Goal O2 saturation 88-92%.     Routine Skin for Bedridden Patients: Apply moisture barrier cream to all .                       Medications: Discontinue all previous medication orders, if any. See new list below.     Medication List        START taking  these medications      pantoprazole 40 MG tablet  Commonly known as: PROTONIX  Take 1 tablet (40 mg total) by mouth 2 (two) times daily before meals.     traZODone 50 MG tablet  Commonly known as: DESYREL  Take 0.5 tablets (25 mg total) by mouth nightly as needed for Insomnia (Anxiety or insomnia).     vitamin D 1000 units Tab  Commonly known as: VITAMIN D3  Take 2 tablets (2,000 Units total) by mouth once daily.            CHANGE how you take these medications      metoprolol succinate 50 MG 24 hr tablet  Commonly known as: TOPROL-XL  Take 1 tablet (50 mg total) by mouth once daily.  What changed:   medication strength  how much to take            CONTINUE taking these medications      albuterol 90 mcg/actuation inhaler  Commonly known as: PROVENTIL HFA  Inhale 2 puffs into the lungs every 6 (six) hours as needed for Wheezing. Rescue     aspirin 81 MG EC tablet  Commonly known as: ECOTRIN  Take 81 mg by mouth once daily.     biotin 1 mg tablet  Take 1,000 mcg by mouth once daily.     BREZTRI AEROSPHERE 160-9-4.8 mcg/actuation Hfaa  Generic drug: budesonide-glycopyr-formoterol  Inhale 2 puffs into the lungs 2 (two) times daily.     busPIRone 5 MG Tab  Commonly known as: BUSPAR  Take 5 mg by mouth daily as needed (anxiety).     ELIQUIS 5 mg Tab  Generic drug: apixaban  Take 1 tablet (5 mg total) by mouth 2 (two) times daily.     fluticasone propionate 50 mcg/actuation nasal spray  Commonly known as: FLONASE  1 spray by Each Nostril route daily as needed for Rhinitis or Allergies.     furosemide 40 MG tablet  Commonly known as: LASIX  Take 1 tablet (40 mg total) by mouth once daily.            STOP taking these medications      acetaminophen 500 MG tablet  Commonly known as: TYLENOL     inhalation spacing device     losartan 50 MG tablet  Commonly known as: COZAAR     meclizine 25 mg tablet  Commonly known as: ANTIVERT     midodrine 5 MG Tab  Commonly known as: PROAMATINE                Immunizations Administered as  of 9/3/2024       Name Date Dose VIS Date Route Exp Date    COVID-19, MRNA, LN-S, PF (Pfizer) (Purple Cap) 12/10/2021 0.3 mL 5/10/2021 Intramuscular --    Site: Right arm     : [employer]     Lot: [id number]     Comment: Adminis     COVID-19, MRNA, LN-S, PF (Pfizer) (Purple Cap) 2/4/2021  3:16 PM 0.3 mL 12/12/2020 Intramuscular 5/31/2021    Site: Right deltoid     Given By: Ana Woods, RN     : [employer]     Lot: [id number]     COVID-19, MRNA, LN-S, PF (Pfizer) (Purple Cap) 1/13/2021  1:14 PM 0.3 mL 12/12/2020 Intramuscular 1/13/2021    Site: Right deltoid     Given By: Misa Ritchie LPN     : [employer]     Lot: [id number]               Some patients may experience side effects after vaccination.  These may include fever, headache, muscle or joint aches.  Most symptoms resolve with 24-48 hours and do not require urgent medical evaluation unless they persist for more than 72 hours or symptoms are concerning for an unrelated medical condition.          _________________________________  Lucero Post MD  09/02/2024

## 2024-09-02 NOTE — SUBJECTIVE & OBJECTIVE
"Interval History: No acute events overnight. Pt reports she woke from sleep with shortness of breath, which made her very anxious. She got PRN vistaril which made her feel better. This morning she states she is short of breath when lying flat. No chest pain or palpitations. She is also having anxiety. States she has had trouble sleeping the past couple nights because "can't turn off" her brain.     Objective:     Vital Signs (Most Recent):  Temp: 98.5 °F (36.9 °C) (09/02/24 0805)  Pulse: 88 (09/02/24 0805)  Resp: 16 (09/02/24 0805)  BP: (!) 155/70 (09/02/24 0806)  SpO2: (!) 92 % (09/02/24 0805) Vital Signs (24h Range):  Temp:  [97.6 °F (36.4 °C)-99 °F (37.2 °C)] 98.5 °F (36.9 °C)  Pulse:  [70-96] 88  Resp:  [15-18] 16  SpO2:  [92 %-100 %] 92 %  BP: (135-164)/(62-76) 155/70     Weight: 70.5 kg (155 lb 6.8 oz)  Body mass index is 26.68 kg/m².    Intake/Output Summary (Last 24 hours) at 9/2/2024 0836  Last data filed at 9/2/2024 0434  Gross per 24 hour   Intake 287 ml   Output 1000 ml   Net -713 ml         Physical Exam  Vitals and nursing note reviewed.   Constitutional:       Appearance: Normal appearance.   HENT:      Head: Normocephalic and atraumatic.      Right Ear: External ear normal.      Left Ear: External ear normal.      Nose: Nose normal.      Mouth/Throat:      Mouth: Mucous membranes are moist.   Eyes:      Pupils: Pupils are equal, round, and reactive to light.   Cardiovascular:      Rate and Rhythm: Normal rate and regular rhythm.   Pulmonary:      Effort: Pulmonary effort is normal.      Breath sounds: Rales present.   Abdominal:      Palpations: Abdomen is soft.   Musculoskeletal:      Cervical back: Normal range of motion.      Right lower leg: Edema present.      Left lower leg: Edema present.   Neurological:      General: No focal deficit present.      Mental Status: She is alert and oriented to person, place, and time.   Psychiatric:         Mood and Affect: Mood normal.         Behavior: " Behavior normal.         Thought Content: Thought content normal.         Judgment: Judgment normal.             Significant Labs: All pertinent labs within the past 24 hours have been reviewed.    Significant Imaging: I have reviewed all pertinent imaging results/findings within the past 24 hours.

## 2024-09-02 NOTE — PROGRESS NOTES
"[facility] - Cardiology [facility] Medicine  Progress Note    Patient Name: Misa Barajas  MRN: [id number]  Patient Class: IP- Inpatient   Admission Date: 2024  Length of Stay: 6 days  Attending Physician: Talha Perdomo MD  Primary Care Provider: Celia Carlisle MD (Inactive)        Subjective:     Principal Problem:Orthostatic hypotension        HPI:  Ms. Barajas is a 78 year old female with a history of COPD chronically on 2L, CKD, HFpEF, remote hx of breast cx s/p chemo c/b polyneuropathy, paroxysmal Afib/flutter who presents from [facility] for new onset dizziness that started around 7am this morning. Patient states she was taking a shower with her nursing aid this morning when she acutely began feeling like she was about to pass out. She denies that the room was spinning but instead endorses lightheadedness. She states she may have passed out for 1-2 minutes as per her nursing aid and remembers being held up by the nursing home staff. She denies any chest pain, palpitations, dyspnea, or any other symptoms prior to becoming dizzy. She denies any post-syncopal symptoms as well. She currently states she feels fine but may become "dizzy" again if she were to stand up. She also denies any new symptoms including rashes, wounds, dysuria, hematuria, hematemesis, abdominal pain, fever, chills. She is unsure if she has melena as nursing home staff changes her diapers. She states this has never happened in the past.     She has had multiple admissions over the last couple of months includin/7- HF exacerbation, AIN, E.faecalis UTI.   - admission for cellulitis and pastruella bacteremia s/p 2 week treatment w/ unaysyn and doxycline.  - admission for gallstone pancreatitis w/ possible early cholecystitis s/p ERCP w/ sphincterotomy on .     I spoke with the patient's daughter over the phone Marina. She states the patient began having symptoms of dizziness around 3 weeks " ago. She noticed it began after her ERCP w/ sphincterotomy. While in rehab the daughter states patient has been persistently orthostatic with low blood pressures and dizziness with standing. The SNF attempted to use increasing doses midodrine and meclizine however she continued to be dizzy and orthostatic.     In the ED patient was noted to be orthostatic with /62 HR 82 when lying and  w/ BP 65/35 when standing. Lactic acid was 1.6, labs otherwise remarkable for downtrending Hgb 10->8. She was started on 1U PRBC and fluids and admitted for further work-up.              Overview/Hospital Course:  Misa Barajas is a 78 y.o. F admitted to hospital medicine for syncope and orthostatic hypotension. GI was consulted for symptomatic anemia. CBC was trended and remained stable after 1unit pRBC. There was no melena, hematochezia, hematemesis, or other signs of active bleeding. GI recommended PPI, but no endoscopy. Telemetry monitoring did not show any afib or flutter. EKG did reveal a bifascicular block. Cardiology curbsided, and bifascicular block felt very unlikely to be contributing to lightheadeness/syncope. Home antihypertensives were held. Infectious workup negative. She received additional 1L IVF on HD1 and HD2. Orthostatic vitals were monitored each day, and the degree of drop in SBP going from laying to sitting continued to improve. Notably, on echo her CVP was 3 mmHg, consistent with intravascular volume depletion. No signs of acute decompensated heart failure (SOB, leg swelling). She continued to feel lightheaded with going from laying to sitting. With ongoing fluid resuscitation and one dose of fludrocortisone, the lightheadedness with sitting/standing eventually resolved and SBP only dropped 4 mmHg going from laying to sitting. Dehydration and intravascular volume depletion is most likely etiology for orthostasis, in setting of recent hospitalization (ERCP and sphincterotomy for gallstone  "pancreatitis) and diuretic use outpatient, and likely reduced PO intake. With ongoing fluid resuscitation, Hgb slowly downtrended along with WBC and platelet count. Consistent with hemodilution. No signs of bleeding. Continued to monitor CBC. Fludricortisone was discontinued and began to reintroduce home meds, starting with Metoprolol. [place] she has a history of orthostatic hypotension in the past that resolved after stopping Losartan, however Losartan was resumed after most recent hospitalization. Will discontinue this medication. She was determined to be stable for discharge, and then she developed shortness of breath and anxiety. Anxiety improved with PRN Vistaril. Exam was notable for volume overload (BLE 2+ pitting edema, rales). Diureses with IV lasix. CXR ordered.     Interval History: No acute events overnight. Pt reports she woke from sleep with shortness of breath, which made her very anxious. She got PRN vistaril which made her feel better. This morning she states she is short of breath when lying flat. No chest pain or palpitations. She is also having anxiety. States she has had trouble sleeping the past couple nights because "can't turn off" her brain.     Objective:     Vital Signs (Most Recent):  Temp: 98.5 °F (36.9 °C) (09/02/24 0805)  Pulse: 88 (09/02/24 0805)  Resp: 16 (09/02/24 0805)  BP: (!) 155/70 (09/02/24 0806)  SpO2: (!) 92 % (09/02/24 0805) Vital Signs (24h Range):  Temp:  [97.6 °F (36.4 °C)-99 °F (37.2 °C)] 98.5 °F (36.9 °C)  Pulse:  [70-96] 88  Resp:  [15-18] 16  SpO2:  [92 %-100 %] 92 %  BP: (135-164)/(62-76) 155/70     Weight: 70.5 kg (155 lb 6.8 oz)  Body mass index is 26.68 kg/m².    Intake/Output Summary (Last 24 hours) at 9/2/2024 0836  Last data filed at 9/2/2024 0434  Gross per 24 hour   Intake 287 ml   Output 1000 ml   Net -713 ml         Physical Exam  Vitals and nursing note reviewed.   Constitutional:       Appearance: Normal appearance.   HENT:      Head: Normocephalic and " atraumatic.      Right Ear: External ear normal.      Left Ear: External ear normal.      Nose: Nose normal.      Mouth/Throat:      Mouth: Mucous membranes are moist.   Eyes:      Pupils: Pupils are equal, round, and reactive to light.   Cardiovascular:      Rate and Rhythm: Normal rate and regular rhythm.   Pulmonary:      Effort: Pulmonary effort is normal.      Breath sounds: Rales present.   Abdominal:      Palpations: Abdomen is soft.   Musculoskeletal:      Cervical back: Normal range of motion.      Right lower leg: Edema present.      Left lower leg: Edema present.   Neurological:      General: No focal deficit present.      Mental Status: She is alert and oriented to person, place, and time.   Psychiatric:         Mood and Affect: Mood normal.         Behavior: Behavior normal.         Thought Content: Thought content normal.         Judgment: Judgment normal.             Significant Labs: All pertinent labs within the past 24 hours have been reviewed.    Significant Imaging: I have reviewed all pertinent imaging results/findings within the past 24 hours.    Assessment/Plan:      * Orthostatic hypotension  Ms. Barajas is an 78 year old female with history as above who presents from her nursing home for syncopal episode while taking a shower at her nursing home. Per her daughter, patient has had periods of lightheadedness over the past 3 weeks, since her ERCP w/ sphincterotomy. States her blood pressure has been low in the SNF despite midodrine treatment. Patient states she had no pro-dromal or post syncopal symptoms. EKG w/ 1st degree AVB, unchanged from previous. She was not taking any BP meds as the nursing home was holding them. Differentials include orthostasis, cardiac related given history of aflutter s/p ablation, neurologic related, drug induced although less likely as nursing home was holding her blood pressure medications and she does not take buspar. Positive orthostatic vitals in the ED and  again on 8/28 after receiving 2L volume (1L IVF and 1 unit pRBC). CT head negative for any acute intracranial abnormalities. Echo shows normal systolic function (EF 60-65%) and normal diastolic function. EKG shows a bifascicular block, however this is similar to EKG 1 year ago prior to the onset of dizziness/lightheadedness. Autonomic neuropathy on differential as a cause of orthostatic hypotension. Got additional 1L IVF and started on fludricortisone. Orthostatic vitals repeated: 131/91 to 126/60 (sitting). Patient no longer lightheaded sitting or standing. Unable to collect standing vitals due to leg weakness/deconditioning.    Per daughter, and confirmed with chart check, patient had orthostatic hypotension in May of this year. Losartan was held and she did not have any dizziness in May, June, or July. During recent admission in July for pancreatitis, Losartan was restarted due to hypertension (this was also in setting of pain) and since that time the dizziness and orthostasis have returned.     Plan:  -D/C losartan indefinitely   - Continue low dose Metoprolol succinate 50 mg   - continue telemetry        Acute blood loss anemia  Appears her baseline was 10-11 over the past couple of months and has down trended to 8.2 on admission. Per ED there was blood noted on ZAHEER and concern for oozing from prior sphincterotomy site done on 7/30 given she is also on Eliquis. She received 1U PRBC in the ED. Hgb has remained stable on repeat CBC. No additional blood noted. GI consulted and recommended PPI and continued CBC trending, but no plans for endoscopy. Per GI, ok to resume eliquis. Hgb has remained overall stable. There have been slight drops (9.8 > 9 > 8.9) following several liters of IVF rehydration with associated drops in WBC and platelet count, representative of hemodilution. Additionally, no signs of active bleeding. Hgb stable.     - PPI: protonix 40 mg BID  - Follow up with GI outpatient    Hypercoagulability  due to atrial fibrillation  S/p ablation with EP. Has follow-up with Dr. Church in September. At that time, Dr. Church will determine whether she needs to be on Eliquis. Currently on metoprolol and Eliquis.   [id number] Score: 6. EKG with 1st degree avb and rbbb seen on previous EKGs. No afib/flutter seen on telemetry. Eliquis initially held in the ED due to mild anemia, however no longer concerned for an acute bleed. No Afib or flutter on EKG or telemetry throughout this hospital stay.    Plan  - monitor on telemetry  - Resumed home Eliquis 2.5 BID  - F/u w/ Dr. Church, EP, next month      Diastolic CHF, chronic  Noted history. On lasix 40 at home.  Echo 8/27: EF 60-65%, normal systolic function, normal diastolic function, PASP 34 mmHg, CVP 3. Volume overload on exam today (bilateral LE pitting edema, crackles)    Plan:  - assess volume status daily  - Furosemide IV 80 mg x 1 today  - Restart home PO furosemide 40 mg tomorrow        COPD (chronic obstructive pulmonary disease)  Patient's COPD is controlled currently.  Patient is currently off COPD Pathway. Continue scheduled inhalers and monitor respiratory status closely.     - On home 2L, no wheezing on my exam  - CXR on admission  - Breo with prn albuterol while inpatient    Chronic respiratory failure with hypoxia  Currently on 2L home oxygen for COPD. Saturating >96%.    CKD (chronic kidney disease) stage 3, GFR 30-59 ml/min  Creatine stable for now. BMP reviewed- noted Estimated Creatinine Clearance: 41 mL/min (based on SCr of 1.1 mg/dL). according to latest data. Based on current GFR, CKD stage is stage 3 - GFR 30-59.  Monitor UOP and serial BMP and adjust therapy as needed. Renally dose meds. Avoid nephrotoxic medications and procedures.        ACP (advance care planning)  Patient made DNR in the ED. I confirmed with patient and her daughter about code status. They have prior paperwork signed, not in our system.      HTN (hypertension)  Home regimen:  Losartan 50 mg qd, Metoprolol succinate. According to pt's daughter, she was off of both medications in May, June, and July and felt well. Once these medications were re-started during recent hospitalization, she began to feel lightheaded and dizzy. Consider stopping anti-hypertensive medications due to symptomatic orthostatic hypotension.     Plan  - Discontinue Losartan indefinitely due to orthostatic hypotension  - Metoprolol Succinate 50 mg qd      VTE Risk Mitigation (From admission, onward)           Ordered     apixaban tablet 5 mg  2 times daily         08/27/24 3854     Reason for No Pharmacological VTE Prophylaxis  Once        Question:  Reasons:  Answer:  Active Bleeding    08/26/24 2051     IP VTE HIGH RISK PATIENT  Once         08/26/24 2051     Place sequential compression device  Until discontinued         08/26/24 2033                    Discharge Planning   NOHEMI: 9/3/2024     Code Status: DNR   Is the patient medically ready for discharge?:     Reason for patient still in hospital (select all that apply): Patient trending condition  Discharge Plan A: Skilled Nursing Facility                  Lucero Post MD  Department of Hospital Medicine   [facility] - Cardiology Stepdown

## 2024-09-03 VITALS
BODY MASS INDEX: 26.54 KG/M2 | TEMPERATURE: 98 F | HEART RATE: 75 BPM | OXYGEN SATURATION: 93 % | WEIGHT: 155.44 LBS | HEIGHT: 64 IN | SYSTOLIC BLOOD PRESSURE: 126 MMHG | DIASTOLIC BLOOD PRESSURE: 63 MMHG | RESPIRATION RATE: 18 BRPM

## 2024-09-03 LAB
ALBUMIN SERPL BCP-MCNC: 1.8 G/DL (ref 3.5–5.2)
ALP SERPL-CCNC: 105 U/L (ref 55–135)
ALT SERPL W/O P-5'-P-CCNC: 15 U/L (ref 10–44)
ANION GAP SERPL CALC-SCNC: 7 MMOL/L (ref 8–16)
AST SERPL-CCNC: 21 U/L (ref 10–40)
BILIRUB SERPL-MCNC: 0.7 MG/DL (ref 0.1–1)
BUN SERPL-MCNC: 18 MG/DL (ref 8–23)
CALCIUM SERPL-MCNC: 9.4 MG/DL (ref 8.7–10.5)
CHLORIDE SERPL-SCNC: 107 MMOL/L (ref 95–110)
CO2 SERPL-SCNC: 25 MMOL/L (ref 23–29)
CREAT SERPL-MCNC: 1.2 MG/DL (ref 0.5–1.4)
EST. GFR  (NO RACE VARIABLE): 45.8 ML/MIN/1.73 M^2
GLUCOSE SERPL-MCNC: 105 MG/DL (ref 70–110)
MAGNESIUM SERPL-MCNC: 1.9 MG/DL (ref 1.6–2.6)
PHOSPHATE SERPL-MCNC: 2.5 MG/DL (ref 2.7–4.5)
POTASSIUM SERPL-SCNC: 3.3 MMOL/L (ref 3.5–5.1)
PROT SERPL-MCNC: 5.1 G/DL (ref 6–8.4)
SODIUM SERPL-SCNC: 139 MMOL/L (ref 136–145)

## 2024-09-03 PROCEDURE — 97535 SELF CARE MNGMENT TRAINING: CPT

## 2024-09-03 PROCEDURE — 36415 COLL VENOUS BLD VENIPUNCTURE: CPT | Performed by: STUDENT IN AN ORGANIZED HEALTH CARE EDUCATION/TRAINING PROGRAM

## 2024-09-03 PROCEDURE — 25000003 PHARM REV CODE 250

## 2024-09-03 PROCEDURE — 84100 ASSAY OF PHOSPHORUS: CPT | Performed by: STUDENT IN AN ORGANIZED HEALTH CARE EDUCATION/TRAINING PROGRAM

## 2024-09-03 PROCEDURE — 80053 COMPREHEN METABOLIC PANEL: CPT | Performed by: STUDENT IN AN ORGANIZED HEALTH CARE EDUCATION/TRAINING PROGRAM

## 2024-09-03 PROCEDURE — 83735 ASSAY OF MAGNESIUM: CPT | Performed by: STUDENT IN AN ORGANIZED HEALTH CARE EDUCATION/TRAINING PROGRAM

## 2024-09-03 PROCEDURE — 25000003 PHARM REV CODE 250: Performed by: INTERNAL MEDICINE

## 2024-09-03 PROCEDURE — 97530 THERAPEUTIC ACTIVITIES: CPT

## 2024-09-03 RX ORDER — POTASSIUM CHLORIDE 20 MEQ/1
40 TABLET, EXTENDED RELEASE ORAL
Status: COMPLETED | OUTPATIENT
Start: 2024-09-03 | End: 2024-09-03

## 2024-09-03 RX ORDER — TRAZODONE HYDROCHLORIDE 50 MG/1
25 TABLET ORAL NIGHTLY PRN
Start: 2024-09-03 | End: 2025-09-03

## 2024-09-03 RX ADMIN — POTASSIUM CHLORIDE 40 MEQ: 1500 TABLET, EXTENDED RELEASE ORAL at 09:09

## 2024-09-03 RX ADMIN — APIXABAN 5 MG: 5 TABLET, FILM COATED ORAL at 09:09

## 2024-09-03 RX ADMIN — FUROSEMIDE 40 MG: 40 TABLET ORAL at 09:09

## 2024-09-03 RX ADMIN — POTASSIUM CHLORIDE 40 MEQ: 1500 TABLET, EXTENDED RELEASE ORAL at 11:09

## 2024-09-03 RX ADMIN — TIOTROPIUM BROMIDE INHALATION SPRAY 2 PUFF: 3.12 SPRAY, METERED RESPIRATORY (INHALATION) at 09:09

## 2024-09-03 RX ADMIN — METOPROLOL SUCCINATE 50 MG: 50 TABLET, EXTENDED RELEASE ORAL at 09:09

## 2024-09-03 RX ADMIN — PANTOPRAZOLE SODIUM 40 MG: 40 TABLET, DELAYED RELEASE ORAL at 05:09

## 2024-09-03 RX ADMIN — FLUTICASONE FUROATE AND VILANTEROL TRIFENATATE 1 PUFF: 100; 25 POWDER RESPIRATORY (INHALATION) at 09:09

## 2024-09-03 NOTE — ASSESSMENT & PLAN NOTE
Noted history. On lasix 40 at home.  Echo 8/27: EF 60-65%, normal systolic function, normal diastolic function, PASP 34 mmHg, CVP 3. Volume overload on exam today (bilateral LE pitting edema, crackles). Improved with Furosemide 80 mg IV x 1.     Plan:  - assess volume status daily  - Restart home PO furosemide 40 mg daily

## 2024-09-03 NOTE — PLAN OF CARE
09/03/24 1204   Post-Acute Status   Post-Acute Authorization Placement   Post-Acute Placement Status Pending payor review/awaiting authorization (if required)     Updated SNF orders sent to [facility] via [facility], of which  notified Kat in admissions.  Kat stated that she needed to reach out to insurance to get the date changed on the auth.      Karey Castelan, ALEXIS  [facility]  [id number]

## 2024-09-03 NOTE — PLAN OF CARE
[facility] - Cardiology Stepdown  Discharge Final Note    Primary Care Provider: Celia Carlisle MD (Inactive)    Expected Discharge Date: 9/3/2024    Final Discharge Note (most recent)       Final Note - 09/03/24 1404          Final Note    Assessment Type Final Discharge Note     Anticipated Discharge Disposition Skilled Nursing Facility        Post-Acute Status    Post-Acute Authorization Placement     Post-Acute Placement Status Set-up Complete/Auth obtained                   Transportation set up via [facility] wheelchair van with oxygen for 2:30pm for pt to return to [facility].  GINGER Ryan to call report to [phone], room 134A.  GINGER Ryan was notified of the above.  MARINE also called and notified pt's daughter Marina of transfer.  SW attempted to notify pt and her other daughter at bedside of transfer but pt was in the middle of patient care.      Karey Castelan, ALEXIS  [facility]  [id number]

## 2024-09-03 NOTE — PT/OT/SLP PROGRESS
Occupational Therapy      Patient Name:  Misa Barajas   MRN:  [id number]    Patient not seen today secondary to attempted to see patient at 1032 am, however; patient decline tx session due to not feeling well 2/2 SOB. Oxygen stats were 92% 82 HR . Will follow-up on the next schedule visit accordingly to OT POC.    9/2/2024

## 2024-09-03 NOTE — DISCHARGE SUMMARY
"Isaias Tobias - Cardiology [facility] Medicine  Discharge Summary      Patient Name: Misa Barajas  MRN: [id number]  ARTHUR: [id number]  Patient Class: IP- Inpatient  Admission Date: 2024  Hospital Length of Stay: 7 days  Discharge Date and Time:  2024 12:18 PM  Attending Physician: Talha Perdomo MD   Discharging Provider: Lucero Post MD  Primary Care Provider: Celia Carlisle MD (Inactive)  Hospital Medicine Team: [facility] HOSP MED 1 Lucero Post MD  Primary Care Team: [facility] HOSP MED 1    HPI:   Ms. Barajas is a 78 year old female with a history of COPD chronically on 2L, CKD, HFpEF, remote hx of breast cx s/p chemo c/b polyneuropathy, paroxysmal Afib/flutter who presents from [facility] for new onset dizziness that started around 7am this morning. Patient states she was taking a shower with her nursing aid this morning when she acutely began feeling like she was about to pass out. She denies that the room was spinning but instead endorses lightheadedness. She states she may have passed out for 1-2 minutes as per her nursing aid and remembers being held up by the nursing home staff. She denies any chest pain, palpitations, dyspnea, or any other symptoms prior to becoming dizzy. She denies any post-syncopal symptoms as well. She currently states she feels fine but may become "dizzy" again if she were to stand up. She also denies any new symptoms including rashes, wounds, dysuria, hematuria, hematemesis, abdominal pain, fever, chills. She is unsure if she has melena as nursing home staff changes her diapers. She states this has never happened in the past.     She has had multiple admissions over the last couple of months includin/7- HF exacerbation, AIN, E.faecalis UTI.   - admission for cellulitis and pastruella bacteremia s/p 2 week treatment w/ unaysyn and doxycline.  - admission for gallstone pancreatitis w/ possible early cholecystitis s/p ERCP w/ " sphincterotomy on 7/30.     I spoke with the patient's daughter over the phone Marina. She states the patient began having symptoms of dizziness around 3 weeks ago. She noticed it began after her ERCP w/ sphincterotomy. While in rehab the daughter states patient has been persistently orthostatic with low blood pressures and dizziness with standing. The SNF attempted to use increasing doses midodrine and meclizine however she continued to be dizzy and orthostatic.     In the ED patient was noted to be orthostatic with /62 HR 82 when lying and  w/ BP 65/35 when standing. Lactic acid was 1.6, labs otherwise remarkable for downtrending Hgb 10->8. She was started on 1U PRBC and fluids and admitted for further work-up.              * No surgery found *      Hospital Course:   Misa Barajas is a 78 y.o. F admitted to hospital medicine for syncope and orthostatic hypotension. GI was consulted for symptomatic anemia. CBC was trended and remained stable after 1unit pRBC. There was no melena, hematochezia, hematemesis, or other signs of active bleeding. GI recommended PPI, but no endoscopy. Telemetry monitoring did not show any afib or flutter. EKG did reveal a bifascicular block. Cardiology curbsided, and bifascicular block felt very unlikely to be contributing to lightheadeness/syncope. Home antihypertensives were held. Infectious workup negative. She received additional 1L IVF on HD1 and HD2. Orthostatic vitals were monitored each day, and the degree of drop in SBP going from laying to sitting continued to improve. Notably, on echo her CVP was 3 mmHg, consistent with intravascular volume depletion. No signs of acute decompensated heart failure (SOB, leg swelling). She continued to feel lightheaded with going from laying to sitting. With ongoing fluid resuscitation and one dose of fludrocortisone, the lightheadedness with sitting/standing eventually resolved and SBP only dropped 4 mmHg going from laying to  sitting. Dehydration and intravascular volume depletion is most likely etiology for orthostasis, in setting of recent hospitalization (ERCP and sphincterotomy for gallstone pancreatitis) and diuretic use outpatient, and likely reduced PO intake. With ongoing fluid resuscitation, Hgb slowly downtrended along with WBC and platelet count. Consistent with hemodilution. No signs of bleeding. Continued to monitor CBC. Fludricortisone was discontinued and began to reintroduce home meds, starting with Metoprolol. [place] she has a history of orthostatic hypotension in the past that resolved after stopping Losartan, however Losartan was resumed after most recent hospitalization. Will discontinue this medication. She was determined to be stable for discharge, and then she developed shortness of breath and anxiety. Anxiety improved with PRN Vistaril. Exam was notable for volume overload (BLE 2+ pitting edema, rales). Diureses with IV lasix. CXR ordered and showed pulmonary interstitial edema. Pt improved with diuresis. Got trazodone for trouble sleeping and anxiety, which helped. She was determined to be medically stable for discharge to [facility].      Goals of Care Treatment Preferences:  Code Status: DNR    Vitals:    09/03/24 1052   BP: 126/63   Pulse: 75   Resp: 18   Temp: 97.7 °F (36.5 °C)     Physical Exam  Vitals and nursing note reviewed.   Constitutional:       Appearance: Normal appearance.   HENT:      Head: Normocephalic and atraumatic.      Right Ear: External ear normal.      Left Ear: External ear normal.      Nose: Nose normal.      Mouth/Throat:      Mouth: Mucous membranes are moist.   Eyes:      Pupils: Pupils are equal, round, and reactive to light.   Cardiovascular:      Rate and Rhythm: Normal rate and regular rhythm.      Pulses: Normal pulses.      Heart sounds: Normal heart sounds.   Pulmonary:      Effort: No respiratory distress.      Breath sounds: Normal breath sounds. No wheezing or rales.    Abdominal:      General: Abdomen is flat.      Palpations: Abdomen is soft.   Musculoskeletal:      Comments: 1+ BLE edema   Skin:     General: Skin is warm.   Neurological:      General: No focal deficit present.      Mental Status: She is alert and oriented to person, place, and time.   Psychiatric:         Mood and Affect: Mood normal.         Behavior: Behavior normal.         Thought Content: Thought content normal.         Judgment: Judgment normal.            Consults:   Consults (From admission, onward)          Status Ordering Provider     Inpatient consult to Gastroenterology  Once        Provider:  (Not yet assigned)    CARLOS Mathews            Cardiac/Vascular  Diastolic CHF, chronic  Noted history. On lasix 40 at home.  Echo 8/27: EF 60-65%, normal systolic function, normal diastolic function, PASP 34 mmHg, CVP 3. Volume overload on exam today (bilateral LE pitting edema, crackles). Improved with Furosemide 80 mg IV x 1.     Plan:  - assess volume status daily  - Restart home PO furosemide 40 mg daily          Final Active Diagnoses:    Diagnosis Date Noted POA    PRINCIPAL PROBLEM:  Orthostatic hypotension [I95.1] 08/26/2024 Yes    Acute blood loss anemia [D62] 07/12/2024 Yes    Hypercoagulability due to atrial fibrillation [D68.69, I48.91] 05/28/2024 Yes    Diastolic CHF, chronic [I50.32] 05/07/2024 Yes    COPD (chronic obstructive pulmonary disease) [J44.9] 08/22/2022 Yes    Chronic respiratory failure with hypoxia [J96.11] 06/26/2021 Yes     Chronic    CKD (chronic kidney disease) stage 3, GFR 30-59 ml/min [N18.30] 02/14/2018 Yes     Chronic    ACP (advance care planning) [Z71.89] 04/24/2017 Not Applicable    HTN (hypertension) [I10] 05/28/2015 Yes      Problems Resolved During this Admission:       Discharged Condition: fair    Disposition: Skilled Nursing Facility    Follow Up:       Patient Instructions:   No discharge procedures on file.    Significant Diagnostic Studies:  N/A    Pending Diagnostic Studies:       None           Medications:  Reconciled Home Medications:      Medication List        START taking these medications      pantoprazole 40 MG tablet  Commonly known as: PROTONIX  Take 1 tablet (40 mg total) by mouth 2 (two) times daily before meals.     traZODone 50 MG tablet  Commonly known as: DESYREL  Take 0.5 tablets (25 mg total) by mouth nightly as needed for Insomnia (Anxiety or insomnia).     vitamin D 1000 units Tab  Commonly known as: VITAMIN D3  Take 2 tablets (2,000 Units total) by mouth once daily.            CHANGE how you take these medications      metoprolol succinate 50 MG 24 hr tablet  Commonly known as: TOPROL-XL  Take 1 tablet (50 mg total) by mouth once daily.  What changed:   medication strength  how much to take            CONTINUE taking these medications      albuterol 90 mcg/actuation inhaler  Commonly known as: PROVENTIL HFA  Inhale 2 puffs into the lungs every 6 (six) hours as needed for Wheezing. Rescue     aspirin 81 MG EC tablet  Commonly known as: ECOTRIN  Take 81 mg by mouth once daily.     biotin 1 mg tablet  Take 1,000 mcg by mouth once daily.     BREZTRI AEROSPHERE 160-9-4.8 mcg/actuation Hfaa  Generic drug: budesonide-glycopyr-formoterol  Inhale 2 puffs into the lungs 2 (two) times daily.     busPIRone 5 MG Tab  Commonly known as: BUSPAR  Take 5 mg by mouth daily as needed (anxiety).     ELIQUIS 5 mg Tab  Generic drug: apixaban  Take 1 tablet (5 mg total) by mouth 2 (two) times daily.     fluticasone propionate 50 mcg/actuation nasal spray  Commonly known as: FLONASE  1 spray by Each Nostril route daily as needed for Rhinitis or Allergies.     furosemide 40 MG tablet  Commonly known as: LASIX  Take 1 tablet (40 mg total) by mouth once daily.            STOP taking these medications      acetaminophen 500 MG tablet  Commonly known as: TYLENOL     inhalation spacing device     losartan 50 MG tablet  Commonly known as: COZAAR     meclizine 25  mg tablet  Commonly known as: ANTIVERT     midodrine 5 MG Tab  Commonly known as: PROAMATINE              Indwelling Lines/Drains at time of discharge:     Time spent on the discharge of patient: 45 minutes         Lucero Post MD  Department of Hospital Medicine  [facility] - Cardiology Stepdown

## 2024-09-03 NOTE — PT/OT/SLP PROGRESS
"Occupational Therapy   Treatment    Name: Misa Barajas  MRN: [id number]  Admitting Diagnosis:  Orthostatic hypotension       Recommendations:     Discharge Recommendations: Moderate Intensity Therapy  Discharge Equipment Recommendations:  walker, rolling  Barriers to discharge:  Decreased caregiver support    Assessment:     Misa Barajas is a 78 y.o. female with a medical diagnosis of Orthostatic hypotension.  She presents with Performance deficits affecting function are weakness, impaired functional mobility, decreased safety awareness, gait instability, impaired endurance, impaired cardiopulmonary response to activity, impaired balance. Pt presents in bed, agreeable to tx. Pt BP 99/52 w/o symptoms seated EOB; participates in EOB grooming and LBD task, after 10m seated EOB pt prompted to attempt standing at RW for BP to be captured in standing and functional STS transfer, pt appears to become anxious by the request reporting sudden SOB, although not observed in RR or sound, back pain, and nausea. Pt cued for focused breathing and does not participate, reports she "may throw up" pt returned to supine and BP machine is not functioning properly/able to measure first trial. Second trial machine indicated 65/31, HR 81, however pt had reported improvement of symptoms since supine and when measured via manual cuff ~4m later (had to retrieve) and RN notification BP returned to 90s/50s range. Pt asymptomatic and returned to mild HOB elevated. RN once again notified of read and pt position back in bed. Extra time was required to monitor medical symptoms/vitals impacting occupational participation on this date.     Rehab Prognosis:  Fair; patient would benefit from acute skilled OT services to address these deficits and reach maximum level of function.       Plan:     Patient to be seen 4 x/week to address the above listed problems via self-care/home management, therapeutic activities, therapeutic exercises, " "neuromuscular re-education  Plan of Care Expires: 09/28/24  Plan of Care Reviewed with: patient    Subjective     Chief Complaint: Nausea, lightheaded (when sitting EOB)  Patient/Family Comments/goals: TO manage BP "so I can get up".   Pain/Comfort:  Pain Rating 1: 0/10  Pain Rating Post-Intervention 1: 0/10    Objective:     Communicated with: Nsg prior to session.  Patient found HOB elevated with PureWick, telemetry, oxygen upon OT entry to room.    General Precautions: Standard, fall    Orthopedic Precautions:N/A  Braces: N/A  Respiratory Status: Nasal cannula     Occupational Performance:     Bed Mobility:    Patient completed Scooting/Bridging with stand by assistance  Patient completed Supine to Sit with stand by assistance  Patient completed Sit to Supine with minimum assistance   Seated EOB SUP t/o grooming    Functional Mobility/Transfers:  Deferred d/t symptoms of low BP seated, anxiety, pain, SOB reported.     Activities of Daily Living:  Grooming: setup tray table EOB  Lower Body Dressing: contact guard assistance to orient sock seated EOB      [facility] 6 Click ADL: 17    Treatment & Education: Pt educated once BP is recovered in bed value of maximally upright bed position and when safe time in chair to support BP regulation for tolerance to upright.     -Education on energy conservation and task modification to maximize safety and (I) during ADLs and mobility  -Education on importance of OOB activity to improve overall activity tolerance and promote recovery  -Pt educated to call for assistance and to transfer with hospital staff only  -Provided education regarding role of OT, POC, & discharge recommendations with pt verbalizing understanding.      Pt had no further questions & when asked whether there were any concerns pt reported none.     Patient left HOB elevated with all lines intact, call button in reach, and nsg notified    GOALS:   Multidisciplinary Problems       Occupational Therapy Goals       "    Problem: Occupational Therapy    Goal Priority Disciplines Outcome Interventions   Occupational Therapy Goal     OT, PT/OT Progressing    Description: Goals to be met by: 9/28/24     Patient will increase functional independence with ADLs by performing:    UE Dressing with Modified [place] and Assistive Devices as needed.  LE Dressing with Modified [place] and Assistive Devices as needed.  Grooming while standing at sink with Modified [place].  Toileting from toilet with Modified [place] for hygiene and clothing management.   Bathing from  shower chair/bench with Modified [place].  Toilet transfer to toilet with Modified [place].  Increased functional strength to WFL for B UE.  Upper extremity exercise program x15 reps per handout, with assistance as needed.                         Time Tracking:     OT Date of Treatment: 09/03/24  OT Start Time: 0919  OT Stop Time: 0950  OT Total Time (min): 31 min    Billable Minutes:Self Care/Home Management 15  Therapeutic Activity 16    OT/MARYA: OT          9/3/2024

## 2024-09-05 ENCOUNTER — PATIENT OUTREACH (OUTPATIENT)
Dept: ADMINISTRATIVE | Facility: CLINIC | Age: 79
End: 2024-09-05
Payer: MEDICARE

## 2024-09-05 ENCOUNTER — PATIENT OUTREACH (OUTPATIENT)
Dept: EMERGENCY MEDICINE | Facility: HOSPITAL | Age: 79
End: 2024-09-05
Payer: MEDICARE

## 2024-09-05 NOTE — PROGRESS NOTES
Successful first follow up message sent to patient. Advised if she needs assistance to give me a call.    SUSHILA Jo  Ed Navigator

## 2024-09-18 ENCOUNTER — TELEPHONE (OUTPATIENT)
Dept: GASTROENTEROLOGY | Facility: CLINIC | Age: 79
End: 2024-09-18

## 2024-09-18 NOTE — TELEPHONE ENCOUNTER
----- Message from Frieda Rich sent at 9/18/2024  9:34 AM CDT -----  Regarding: Reschedule Existing Appointments          Name Of Caller:   Iqra (Pt's Daughter)      Contact Preference:  [phone]      Nature of call:  Calling to reschedule pt's appt. The pt's nursing facility won't be able to make transportation arrangements without a weeks notice. I was unable to r/s it in Epic.

## 2024-09-23 NOTE — PROGRESS NOTES
Subjective   Patient ID:  Misa Barajas is a 78 y.o. female who presents for follow-up of Atrial Flutter      78 yoF here for arrhythmia management.     5/24: She developed AFL 3/24 and was in it again 5/2/24. Normal EF. On OAC. Event monitor with AFL. She has extensive bruising on OAC.     Interval history: AFL ablation 6/17/24. She remained on eliquis despite bleeding issues. She was admitted for anemia and no change to OAC was made. She has orthostatic hypotension and is on metoprolol 50 mg qd for AFL.     Echo 8/24:     ·  Left Ventricle: The left ventricle is normal in size. Normal wall thickness. There is concentric remodeling. There is normal systolic function with a visually estimated ejection fraction of 60 - 65%. There is normal diastolic function.  ·  Right Ventricle: Normal right ventricular cavity size. Wall thickness is normal. Systolic function is normal.  ·  Aortic Valve: The aortic valve is a trileaflet valve. There is mild aortic valve sclerosis.  ·  Mitral Valve: There is moderate mitral annular calcification present.  ·  Tricuspid Valve: There is mild regurgitation.  ·  Pulmonary Artery: The estimated pulmonary artery systolic pressure is 34 mmHg.  ·  IVC/SVC: Normal venous pressure at 3 mmHg.    Echo 5/24:  ·  Left Ventricle: The left ventricle is normal in size. Ventricular mass is normal. Normal wall thickness. Normal wall motion. There is normal systolic function. Ejection fraction by visual approximation is 55%. Unable to assess diastolic function due to atrial flutter.  ·  Right Ventricle: Normal right ventricular cavity size. Wall thickness is normal. Systolic function is mildly reduced.  ·  Aortic Valve: The aortic valve is a trileaflet valve. There is moderate aortic valve sclerosis. There is annular calcification present. Mildly restricted motion. No stenosis.  ·  Tricuspid Valve: There is mild to moderate regurgitation.  ·  Pulmonary Artery: The estimated pulmonary artery systolic  pressure is 44 mmHg.  ·  IVC/SVC: Elevated venous pressure at 15 mmHg.    My interpretation of the ECG is:        Past Medical History:  07/27/2024: Acute biliary pancreatitis  07/12/2024: Anemia  No date: Aortic atherosclerosis  No date: Arthritis      Comment:  knee joint pain  08/22/2022: Asthma-COPD overlap syndrome      Comment:  home o2  2002: Breast cancer      Comment:  left breast & lymph nodes-s/p sx with chemo  No date: Cataracts, bilateral  12/24/2019: Chronic diastolic heart failure  No date: Chronic kidney disease, stage 3  05/16/2024: Class 1 obesity due to excess calories with serious   comorbidity and body mass index (BMI) of 30.0 to 30.9 in adult  No date: History of chemotherapy      Comment:  last treatment 12/2002 (had 8 treatments)  11/20/2016: Hyperlipidemia  No date: Hypertension  01/25/2022: Lymphedema of both lower extremities  06/02/2014: Nephrolithiasis  No date: Osteoporosis  06/07/2021: Paroxysmal atrial fibrillation  01/14/2016: Renal osteodystrophy  01/25/2022: Venous stasis dermatitis of both lower extremities  No date: Vitamin D insufficiency    Past Surgical History:  6/17/2024: ABLATION, ATRIAL FLUTTER, TYPICAL; N/A      Comment:  Procedure: Ablation, Atrial Flutter, Typical;  Surgeon:                Taz Church MD;  Location: [facility] EP LAB;                 Service: Cardiology;  Laterality: N/A;  AFL, RFA, LORENE,                MAKAYLA (cx if SR), anes, MB, 3 Prep  2002: BREAST BIOPSY; Left      Comment:  core bx, +  2018: BREAST BIOPSY; Right      Comment:  core  2019: BREAST BIOPSY; Right  2002: BREAST LUMPECTOMY; Left  4/28/2022: CYSTOSCOPY      Comment:  Procedure: CYSTOSCOPY;  Surgeon: Vik Ware MD;  Location: [facility] OR [address] [place] [address];  Service: Urology;;  5/30/2022: CYSTOSCOPY      Comment:  Procedure: CYSTOSCOPY;  Surgeon: Bonnie Knutson MD;  Location: [facility] OR [address] [place] [address];  Service: Urology;;  6/17/2024: ECHOCARDIOGRAM,TRANSESOPHAGEAL;  N/A      Comment:  Procedure: Transesophageal echo (MAKAYLA) intra-procedure                log documentation;  Surgeon: Nestor Martinez MD;                 Location: [facility] EP LAB;  Service: Cardiology;  Laterality:               N/A;  7/30/2024: ERCP; N/A      Comment:  Procedure: ERCP (ENDOSCOPIC RETROGRADE                CHOLANGIOPANCREATOGRAPHY);  Surgeon: Sierra Cotto MD;                Location: [facility] ENDO (2ND FLR);  Service: Endoscopy;                 Laterality: N/A;  5/30/2022: LASER LITHOTRIPSY      Comment:  Procedure: LITHOTRIPSY, USING LASER;  Surgeon: Bonnie Knutson MD;  [facility];  Service:                Urology;;  No date: LITHOTRIPSY  06/2002: MASTECTOMY; Left      Comment:  left-& lymph node dissection  5/30/2022: REPLACEMENT OF STENT; Right      Comment:  Procedure: REPLACEMENT, STENT;  Surgeon: Bonnie Knutson MD;  [facility];  Service:                Urology;  Laterality: Right;  4/28/2022: RETROGRADE PYELOGRAPHY; Right      Comment:  Procedure: PYELOGRAM, RETROGRADE;  Surgeon: Vik Ware MD;  [facility];  Service:                Urology;  Laterality: Right;  3/11/2021: ROBOT-ASSISTED LAPAROSCOPIC PARTIAL NEPHRECTOMY USING DA   JESÚS XI; Right      Comment:  Procedure: XI ROBOTIC NEPHRECTOMY, PARTIAL;  Surgeon:                Taz Ortega MD;  Location: [facility] OR 2ND FLR;                 Service: Urology;  Laterality: Right;  4hr/ gen with                regionalFortec confirmation:  [id number] for 11:15am                case NC  5/30/2022: URETEROSCOPIC REMOVAL OF URETERIC CALCULUS; Right      Comment:  Procedure: REMOVAL, CALCULUS, URETER, URETEROSCOPIC;                 Surgeon: Bonnie Knutson MD;  [facility];  Service: Urology;  Laterality: Right;  No date: ureteroscopy; Bilateral      Comment:  6.14  5/30/2022: URETEROSCOPY; Right       Comment:  Procedure: URETEROSCOPY;  Surgeon: Bonnie Knutson MD;  Location: [address];  Service:                Urology;  Laterality: Right;    Social History    Socioeconomic History      Marital status:     Tobacco Use      Smoking status: Former        Packs/day: 0.00        Years: 1 pack/day for 50.0 years (50.0 ttl pk-yrs)        Types: Cigarettes        Start date: 1960        Quit date: 5/20/2009        Years since quitting: 15.3      Smokeless tobacco: Former    Substance and Sexual Activity      Alcohol use: No      Drug use: No      Sexual activity: Not Currently        Partners: Male        Birth control/protection: Partner-Vasectomy    Social Determinants of Health  Financial Resource Strain: Low Risk  (7/27/2024)      Overall Financial Resource Strain (CARDIA)          Difficulty of Paying Living Expenses: Not very hard  Food Insecurity: No Food Insecurity (7/27/2024)      Hunger Vital Sign          Worried About Running Out of Food in the Last Year: Never true          Ran Out of Food in the Last Year: Never true  Transportation Needs: No Transportation Needs (7/27/2024)      TRANSPORTATION NEEDS          Transportation : No  Physical Activity: Sufficiently Active (7/13/2024)      Exercise Vital Sign          Days of Exercise per Week: 7 days          Minutes of Exercise per Session: 30 min  Stress: No Stress Concern Present (7/27/2024)      [employer] [place] [employer] - Occupational Stress Questionnaire          Feeling of Stress : Only a little  Recent Concern: Stress - Stress Concern Present (7/13/2024)      [employer] [place] [employer] - Occupational Stress Questionnaire          Feeling of Stress : To some extent  Housing Stability: Low Risk  (7/27/2024)      Housing Stability Vital Sign          Unable to Pay for Housing in the Last Year: No          Homeless in the Last Year: No    Review of patient's family history indicates:  Problem:  Bone cancer      Relation: Mother          Name: Bibi              Age of Onset: (Not Specified)  Problem: Breast cancer      Relation: Mother          Name: Bibi              Age of Onset: (Not Specified)  Problem: Arthritis      Relation: Mother          Name: Bibi              Age of Onset: (Not Specified)              Comment: Breast Cancer  Problem: Breast cancer      Relation: Sister          Name:               Age of Onset: (Not Specified)  Problem: Cancer      Relation: Father          Name: Maxwell              Age of Onset: (Not Specified)              Comment: Asbestos cancer  Problem: No Known Problems      Relation: Brother          Name:               Age of Onset: (Not Specified)  Problem: Fibroids      Relation: Daughter          Name:               Age of Onset: (Not Specified)  Problem: Crohn's disease      Relation: Daughter          Name:               Age of Onset: (Not Specified)  Problem: No Known Problems      Relation: Daughter          Name:               Age of Onset: (Not Specified)  Problem: Arthritis      Relation: Sister          Name:               Age of Onset: (Not Specified)              Comment: Breast Cancer  Problem: Anesthesia problems      Relation: Neg Hx          Name:               Age of Onset: (Not Specified)  Problem: Glaucoma      Relation: Neg Hx          Name:               Age of Onset: (Not Specified)    Current Outpatient Medications:  albuterol (PROVENTIL HFA) 90 mcg/actuation inhaler, Inhale 2 puffs into the lungs every 6 (six) hours as needed for Wheezing. Rescue, Disp: 18 g, Rfl: 3  apixaban (ELIQUIS) 5 mg Tab, Take 1 tablet (5 mg total) by mouth 2 (two) times daily., Disp: 60 tablet, Rfl: 0  aspirin (ECOTRIN) 81 MG EC tablet, Take 81 mg by mouth once daily., Disp: , Rfl:   biotin 1 mg tablet, Take 1,000 mcg by mouth once daily., Disp: , Rfl:   budesonide-glycopyr-formoterol (BREZTRI AEROSPHERE) 160-9-4.8 mcg/actuation HFAA, Inhale 2 puffs into the  lungs 2 (two) times daily., Disp: 32.1 g, Rfl: 3  busPIRone (BUSPAR) 5 MG Tab, Take 5 mg by mouth daily as needed (anxiety)., Disp: , Rfl:   fluticasone propionate (FLONASE) 50 mcg/actuation nasal spray, 1 spray by Each Nostril route daily as needed for Rhinitis or Allergies., Disp: , Rfl:   furosemide (LASIX) 40 MG tablet, Take 1 tablet (40 mg total) by mouth once daily., Disp: 30 tablet, Rfl: 11  metoprolol succinate (TOPROL-XL) 50 MG 24 hr tablet, Take 1 tablet (50 mg total) by mouth once daily., Disp: , Rfl:   pantoprazole (PROTONIX) 40 MG tablet, Take 1 tablet (40 mg total) by mouth 2 (two) times daily before meals., Disp: , Rfl:   traZODone (DESYREL) 50 MG tablet, Take 0.5 tablets (25 mg total) by mouth nightly as needed for Insomnia (Anxiety or insomnia)., Disp: , Rfl:   vitamin D (VITAMIN D3) 1000 units Tab, Take 2 tablets (2,000 Units total) by mouth once daily., Disp: 180 tablet, Rfl: 3    No current facility-administered medications for this visit.          Review of Systems   Constitutional: Negative.   HENT: Negative.     Eyes: Negative.    Cardiovascular:  Negative for chest pain, dyspnea on exertion, leg swelling, near-syncope, palpitations and syncope.   Respiratory:  Positive for shortness of breath.    Endocrine: Negative.    Hematologic/Lymphatic: Negative.    Skin: Negative.    Musculoskeletal: Negative.    Gastrointestinal: Negative.    Genitourinary: Negative.    Neurological: Negative.  Negative for dizziness and light-headedness.   Psychiatric/Behavioral: Negative.     Allergic/Immunologic: Negative.           Objective     Physical Exam  Vitals reviewed.   Constitutional:       General: She is not in acute distress.     Appearance: She is well-developed.   HENT:      Head: Normocephalic and atraumatic.   Eyes:      Pupils: Pupils are equal, round, and reactive to light.   Neck:      Thyroid: No thyromegaly.      Vascular: No JVD.   Cardiovascular:      Rate and Rhythm: Normal rate. Rhythm  irregular.      Chest Wall: PMI is not displaced.      Heart sounds: Normal heart sounds, S1 normal and S2 normal. No murmur heard.     No friction rub. No gallop.   Pulmonary:      Effort: Pulmonary effort is normal. No respiratory distress.      Breath sounds: Normal breath sounds. No wheezing or rales.   Abdominal:      General: Bowel sounds are normal. There is no distension.      Palpations: Abdomen is soft.      Tenderness: There is no abdominal tenderness. There is no guarding or rebound.   Musculoskeletal:         General: No tenderness. Normal range of motion.      Cervical back: Normal range of motion.   Skin:     General: Skin is warm and dry.      Findings: No erythema or rash.   Neurological:      Mental Status: She is alert and oriented to person, place, and time.      Cranial Nerves: No cranial nerve deficit.   Psychiatric:         Behavior: Behavior normal.         Thought Content: Thought content normal.         Judgment: Judgment normal.            Assessment and Plan     1. Typical atrial flutter    2. Orthostatic hypotension        Plan:  78 yoF here for AFL management. No recurrence since her ablation. Can stop OAC. Drop metoprolol to 12.5 mg qd. RTC 6m

## 2024-09-24 ENCOUNTER — HOSPITAL ENCOUNTER (OUTPATIENT)
Dept: CARDIOLOGY | Facility: CLINIC | Age: 79
Discharge: HOME | End: 2024-09-24
Payer: MEDICARE

## 2024-09-24 ENCOUNTER — OFFICE VISIT (OUTPATIENT)
Dept: ELECTROPHYSIOLOGY | Facility: CLINIC | Age: 79
End: 2024-09-24
Payer: MEDICARE

## 2024-09-24 VITALS
HEART RATE: 87 BPM | OXYGEN SATURATION: 94 % | SYSTOLIC BLOOD PRESSURE: 80 MMHG | WEIGHT: 155.44 LBS | BODY MASS INDEX: 26.68 KG/M2 | DIASTOLIC BLOOD PRESSURE: 50 MMHG

## 2024-09-24 DIAGNOSIS — I48.3 TYPICAL ATRIAL FLUTTER: Primary | ICD-10-CM

## 2024-09-24 DIAGNOSIS — I48.0 PAROXYSMAL ATRIAL FIBRILLATION: ICD-10-CM

## 2024-09-24 DIAGNOSIS — I95.1 ORTHOSTATIC HYPOTENSION: ICD-10-CM

## 2024-09-24 LAB
OHS QRS DURATION: 126 MS
OHS QTC CALCULATION: 490 MS

## 2024-09-24 PROCEDURE — 1126F AMNT PAIN NOTED NONE PRSNT: CPT | Mod: CPTII,S$GLB,, | Performed by: INTERNAL MEDICINE

## 2024-09-24 PROCEDURE — 3078F DIAST BP <80 MM HG: CPT | Mod: CPTII,S$GLB,, | Performed by: INTERNAL MEDICINE

## 2024-09-24 PROCEDURE — 93010 ELECTROCARDIOGRAM REPORT: CPT | Mod: S$GLB,,, | Performed by: INTERNAL MEDICINE

## 2024-09-24 PROCEDURE — 99999 PR PBB SHADOW E&M-EST. PATIENT-LVL IV: CPT | Mod: PBBFAC,,, | Performed by: INTERNAL MEDICINE

## 2024-09-24 PROCEDURE — 1159F MED LIST DOCD IN RCRD: CPT | Mod: CPTII,S$GLB,, | Performed by: INTERNAL MEDICINE

## 2024-09-24 PROCEDURE — 1111F DSCHRG MED/CURRENT MED MERGE: CPT | Mod: CPTII,S$GLB,, | Performed by: INTERNAL MEDICINE

## 2024-09-24 PROCEDURE — 3288F FALL RISK ASSESSMENT DOCD: CPT | Mod: CPTII,S$GLB,, | Performed by: INTERNAL MEDICINE

## 2024-09-24 PROCEDURE — 3074F SYST BP LT 130 MM HG: CPT | Mod: CPTII,S$GLB,, | Performed by: INTERNAL MEDICINE

## 2024-09-24 PROCEDURE — 1101F PT FALLS ASSESS-DOCD LE1/YR: CPT | Mod: CPTII,S$GLB,, | Performed by: INTERNAL MEDICINE

## 2024-09-24 PROCEDURE — 99214 OFFICE O/P EST MOD 30 MIN: CPT | Mod: S$GLB,,, | Performed by: INTERNAL MEDICINE

## 2024-09-24 PROCEDURE — 93005 ELECTROCARDIOGRAM TRACING: CPT | Mod: S$GLB,,, | Performed by: INTERNAL MEDICINE

## 2024-09-24 RX ORDER — AMOXICILLIN 250 MG
CAPSULE ORAL
COMMUNITY

## 2024-09-24 RX ORDER — MECLIZINE HCL 12.5 MG 12.5 MG/1
12.5 TABLET ORAL 3 TIMES DAILY
COMMUNITY

## 2024-09-24 RX ORDER — POLYETHYLENE GLYCOL 3350 17 G/17G
POWDER, FOR SOLUTION ORAL
COMMUNITY

## 2024-09-24 RX ORDER — FLUDROCORTISONE ACETATE 0.1 MG/1
TABLET ORAL
COMMUNITY

## 2024-09-24 RX ORDER — SODIUM CHLORIDE 0.9 % (FLUSH) 0.9 %
SYRINGE (ML) INJECTION
COMMUNITY
Start: 2024-08-19

## 2024-09-24 RX ORDER — ALBUTEROL SULFATE 90 UG/1
INHALANT RESPIRATORY (INHALATION)
COMMUNITY

## 2024-09-24 RX ORDER — METOPROLOL SUCCINATE 25 MG/1
12.5 TABLET, EXTENDED RELEASE ORAL DAILY
Qty: 45 TABLET | Refills: 3 | Status: SHIPPED | OUTPATIENT
Start: 2024-09-24 | End: 2025-09-24

## 2024-09-24 RX ORDER — ADHESIVE BANDAGE
BANDAGE TOPICAL
COMMUNITY

## 2024-09-24 RX ORDER — ACETAMINOPHEN 500 MG
TABLET ORAL
COMMUNITY

## 2024-09-24 RX ORDER — FUROSEMIDE 20 MG/1
20 TABLET ORAL
COMMUNITY

## 2024-10-14 ENCOUNTER — TELEPHONE (OUTPATIENT)
Dept: PHARMACY | Facility: CLINIC | Age: 79
End: 2024-10-14
Payer: MEDICARE

## 2024-10-14 NOTE — TELEPHONE ENCOUNTER
[facility]/Population Health Chart Review & Patient Outreach Details For Medication Adherence Project    Reason for Outreach Encounter: 3rd Party payor non-compliance report ([employer], [employer], C, etc)  2.  Patient Outreach Method: Reviewed patient chart   3.   Medication in question:   Hypertension Medications               furosemide (LASIX) 20 MG tablet Take 20 mg by mouth.    metoprolol succinate (TOPROL-XL) 25 MG 24 hr tablet Take ½ tablet (12.5 mg total) by mouth once daily.                        Losartan and rosuvastatin discontinued      4.  Reviewed and or Updates Made To: Patient Chart  5. Outreach Outcomes and/or actions taken: Medication discontinued  Additional Notes:

## 2024-10-15 ENCOUNTER — TELEPHONE (OUTPATIENT)
Dept: GASTROENTEROLOGY | Facility: CLINIC | Age: 79
End: 2024-10-15
Payer: MEDICARE

## 2024-10-15 NOTE — TELEPHONE ENCOUNTER
----- Message from Cyn sent at 10/15/2024  3:38 PM CDT -----  Regarding: APPT  Contact: [phone]  Pt is calling to speak to someone in the office to r/s her appt that she is currently scheduled for;on 10/16  no available appts in Epic. Please call to advise. [phone] Thanks.          Additional Info:  pt has pneumonia

## 2024-10-25 NOTE — CARE UPDATE
[facility] Care Support Interaction      I have reviewed the chart of Misa Barajas who is hospitalized for Hypotension. The patient is currently located in the following unit: C[facility]     I have seen and examined the patient and provided the following support:     Patient experience rounds - positive experience reported- no issues to escalate.       Savanah Garcia NP  Unit Based [facility]     Verbal for metFORMIN (GLUCOPHAGE-XR) 500 MG ER 24hr tablet -2 times daily with meals #360 w/ 3 refills called to [facility], spoke with Soila

## 2024-10-29 ENCOUNTER — OFFICE VISIT (OUTPATIENT)
Dept: PULMONOLOGY | Facility: CLINIC | Age: 79
End: 2024-10-29
Payer: MEDICARE

## 2024-10-29 VITALS
WEIGHT: 151 LBS | DIASTOLIC BLOOD PRESSURE: 70 MMHG | SYSTOLIC BLOOD PRESSURE: 135 MMHG | OXYGEN SATURATION: 94 % | HEART RATE: 86 BPM | HEIGHT: 64 IN | BODY MASS INDEX: 25.78 KG/M2

## 2024-10-29 DIAGNOSIS — J44.9 CHRONIC OBSTRUCTIVE PULMONARY DISEASE, UNSPECIFIED COPD TYPE: Primary | ICD-10-CM

## 2024-10-29 DIAGNOSIS — J96.11 CHRONIC RESPIRATORY FAILURE WITH HYPOXIA: Chronic | ICD-10-CM

## 2024-10-29 PROCEDURE — 99999 PR PBB SHADOW E&M-EST. PATIENT-LVL III: CPT | Mod: PBBFAC,,, | Performed by: INTERNAL MEDICINE

## 2024-11-04 NOTE — PROGRESS NOTES
Subjective:   @Patient ID:  Misa Barajas is a 79 y.o. female who presents for evaluation of No chief complaint on file.      HPI:     11/5/24  After las visit she was admitted multiple times for pasteurella bacteremia and GS pancreatitis with good recovery. She feel good and is tolerating meds w/o s/e. Today, she voiced no CV complaints/ Tolerating meds w/o side effects. Long discussion about DNR status and I thinks she needs further assessment and guidance from palliative team to have an educated opinion. Denies cp, sob, palpitations, presyncope/dizziness, syncope, orthopnea, PND, bendopnea, decrease ET, NVDC, fever, chills.         78 y.o. female with PMH of COPD, HFpEF (60% on 5/4/22), pAF (eliquis), RFA for typical CCW AAF,  HTN, HLD, COPD on home O2,  presenting to clinic to [facility] care. She also endorses new redness and warmth with associated itching to her RLE. She has small pustules that she noticed to RLE a few days ago. Denies fever, chills, chest pain, cough, SOB, abdominal pain, n/v/d, dysuria, headaches. Has chronic discoloration to BLEs without recent worsening.       [place] [facility]:   Misa Barajas is a 78 y.o. F admitted to hospital medicine for syncope and orthostatic hypotension. GI was consulted for symptomatic anemia. CBC was trended and remained stable after 1unit pRBC. There was no melena, hematochezia, hematemesis, or other signs of active bleeding. GI recommended PPI, but no endoscopy. Telemetry monitoring did not show any afib or flutter. EKG did reveal a bifascicular block. Cardiology curbsided, and bifascicular block felt very unlikely to be contributing to lightheadeness/syncope. Home antihypertensives were held. Infectious workup negative. She received additional 1L IVF on HD1 and HD2. Orthostatic vitals were monitored each day, and the degree of drop in SBP going from laying to sitting continued to improve. Notably, on echo her CVP was 3 mmHg,  consistent with intravascular volume depletion. No signs of acute decompensated heart failure (SOB, leg swelling). She continued to feel lightheaded with going from laying to sitting. With ongoing fluid resuscitation and one dose of fludrocortisone, the lightheadedness with sitting/standing eventually resolved and SBP only dropped 4 mmHg going from laying to sitting. Dehydration and intravascular volume depletion is most likely etiology for orthostasis, in setting of recent hospitalization (ERCP and sphincterotomy for gallstone pancreatitis) and diuretic use outpatient, and likely reduced PO intake. With ongoing fluid resuscitation, Hgb slowly downtrended along with WBC and platelet count. Consistent with hemodilution. No signs of bleeding. Continued to monitor CBC. Fludricortisone was discontinued and began to reintroduce home meds, starting with Metoprolol. Gypsy she has a history of orthostatic hypotension in the past that resolved after stopping Losartan, however Losartan was resumed after most recent hospitalization. Will discontinue this medication. She was determined to be stable for discharge, and then she developed shortness of breath and anxiety. Anxiety improved with PRN Vistaril. Exam was notable for volume overload (BLE 2+ pitting edema, rales). Diureses with IV lasix. CXR ordered and showed pulmonary interstitial edema. Pt improved with diuresis. Got trazodone for trouble sleeping and anxiety, which helped. She was determined to be medically stable for discharge to [facility] SN[facility].      She has had multiple admissions over the last couple of months includin/7- HF exacerbation, AIN, E.faecalis UTI.   - admission for cellulitis and pastruella bacteremia s/p 2 week treatment w/ unaysyn and doxycline.  - admission for gallstone pancreatitis w/ possible early cholecystitis s/p ERCP w/ sphincterotomy on .     I spoke with the patient's daughter over the phone Marina. She states the  patient began having symptoms of dizziness around 3 weeks ago. She noticed it began after her ERCP w/ sphincterotomy. While in rehab the daughter states patient has been persistently orthostatic with low blood pressures and dizziness with standing. The SNF attempted to use increasing doses midodrine and meclizine however she continued to be dizzy and orthostatic.           Echocardiogram  TTE 3/2/2024    Left Ventricle: The left ventricle is normal in size. Normal wall thickness. Normal wall motion. There is normal systolic function with a visually estimated ejection fraction of 60 - 65%. Grade II diastolic dysfunction.    Right Ventricle: Normal right ventricular cavity size. Wall thickness is normal. Right ventricle wall motion  is normal. Systolic function is normal.    Pulmonary Artery: The estimated pulmonary artery systolic pressure is 37 mmHg.    IVC/SVC: Normal venous pressure at 3 mmHg.     Stress testing  Nuclear stress test 5/13/2022    Equivocal myocardial perfusion scan.    There is a mild to moderate intensity, small to moderate sized, equivocal perfusion abnormality that is fixed in the inferior and inferolateral wall(s). This finding is equivocal due to bowel interference.    There are no other significant perfusion abnormalities.    The visually estimated ejection fraction is normal at rest and normal during stress.    There is normal wall motion at rest and post stress.    LV cavity size is normal at rest and normal at stress.    The EKG portion of this study is negative for ischemia.    The patient reported no chest pain during the stress test.    There were no arrhythmias during stress.    When compared to the previous study from 4/15/2021, the inferior wall defect is now present on stress and rest images. There is underlying bowel which may be interfering.    Patient Active Problem List    Diagnosis Date Noted    Typical atrial flutter 09/24/2024    Orthostatic hypotension 08/26/2024    Yeast  infection 08/09/2024    Left arm swelling 08/07/2024    Leukocytosis 08/06/2024    Acute pancreatitis 07/27/2024    Edema 07/27/2024    Cellulitis 07/12/2024    Gram-negative bacteremia - Pasturella multocida 07/12/2024    Acute blood loss anemia 07/12/2024     POA, symptomatic, s/p transfusion      Leucocytosis 07/12/2024    Former heavy tobacco smoker 06/19/2024    Acute on chronic congestive heart failure 05/30/2024    Hypercoagulability due to atrial fibrillation 05/28/2024    Hypotension 05/20/2024    Class 1 obesity due to excess calories with serious comorbidity and body mass index (BMI) of 30.0 to 30.9 in adult 05/16/2024    Acute interstitial nephritis 05/15/2024    Hypermagnesemia 05/12/2024    Urinary retention 05/10/2024    Hypophosphatemia 05/10/2024    Elevated troponin 05/08/2024    Wound of right lower extremity 05/08/2024    Acute cystitis 05/08/2024    Diastolic CHF, chronic 05/07/2024    Unspecified inflammatory spondylopathy, multiple sites in spine 03/13/2024    Hypokalemia 03/02/2024    Hypomagnesemia 03/02/2024    Senile purpura 09/21/2022    Osteoporosis 08/22/2022    COPD (chronic obstructive pulmonary disease) 08/22/2022    Epigastric abdominal pain 05/12/2022    Right sided hydroureteronephrosis 05/04/2022    Chronic anticoagulation 05/04/2022    Right ureteral stone 04/28/2022    Lymphedema of both lower extremities 01/25/2022    Venous insufficiency of both lower extremities 01/25/2022    Venous stasis dermatitis of both lower extremities 01/25/2022    Chronic diastolic heart failure 12/05/2021    Chronic respiratory failure with hypoxia 06/26/2021     POA  Home O2- 2L      Centrilobular emphysema 06/22/2021    Acute on chronic diastolic heart failure 12/24/2019    Varicose veins of both legs with edema 08/28/2019    Overweight with body mass index (BMI) of 27 to 27.9 in adult 08/22/2019    CKD (chronic kidney disease) stage 3, GFR 30-59 ml/min 02/14/2018    Breast calcification, right  "07/20/2017    Calcified granuloma of lung 05/30/2017     Seen on renal stone CT dated 05/30/17      RBBB 05/17/2017    ACP (advance care planning) 04/24/2017    Hyperlipidemia 11/20/2016    Aortic atherosclerosis 08/08/2016     As seen on imaging dated 08/09/10      Osteoarthritis of knee 01/14/2016    Renal osteodystrophy 01/14/2016    Secondary hyperparathyroidism of renal origin 11/27/2015    Drug-induced polyneuropathy 11/27/2015    Arthritis of facet joints at multiple vertebral levels 07/24/2015     Seen on CT renal stone dated 07/24/15      HTN (hypertension) 05/28/2015    Nephrolithiasis 06/02/2014                    LABS  CBC  @LABRCNTIP(WBC:3,RBC:3,HGB:3,HCT:3,PLT:3,MCV:3,MCH:3,MCHC:3)@  BMP  @LABRCNTIP(NA:3,K:3,CO2:3,CL:3,BUN:3,Creatinine:3,GLU:3,GFR:3)@    POCT-Glucose  No results found for: "POCTGLUCOSE"    @LABRCNTIP(Calcium:3,MG:3,PHOS:3)@  LFT  @LABRCNTIP(PROT:3,Albumin:3,,Bilitot:3,AST:3,Alkphos:3,ALT:3)@  GFR     COAGS  @LABRCNTIP(PT:3,INR:3,APTT:3)@  CE  @LABRCNTIP(troponini:3,cktotal:3,ckmb:3)@  ABGs  @[id number](PH,PCO2,PO2,HCO3,POCSATURATED,BE)@  BNP  @LABRCNTIP(BNP:3)@    LAST HbA1c  Lab Results   Component Value Date    HGBA1C 5.3 05/08/2024       Lipid panel  Lab Results   Component Value Date    CHOL 119 (L) 07/27/2024    CHOL 148 08/21/2023    CHOL 150 02/15/2023     Lab Results   Component Value Date    HDL 52 07/27/2024    HDL 66 08/21/2023    HDL 75 02/15/2023     Lab Results   Component Value Date    LDLCALC 57.8 (L) 07/27/2024    LDLCALC 67.0 08/21/2023    LDLCALC 60.6 (L) 02/15/2023     Lab Results   Component Value Date    TRIG 46 07/27/2024    TRIG 75 08/21/2023    TRIG 72 02/15/2023     Lab Results   Component Value Date    CHOLHDL 43.7 07/27/2024    CHOLHDL 44.6 08/21/2023    CHOLHDL 50.0 02/15/2023            Review of Systems   Cardiovascular:  Positive for leg swelling.   All other systems reviewed and are negative.      Objective:   General Appearance: Pleasant, NAD  Eyes: " PERRL, EOMI, anicteric  ENT: MMM w/o erythema  Neck: Supple, JVP < 7cm w/o HJR, 2+ carotid pulses, no bruits.  Lungs: Normal inspiratory effort, clear to auscultation bilaterally.  Heart: Regular rate & rhythm, normal S1 and S2, no murmurs, rubs, or gallops.  Abdomen: Soft, non-tender, non-distended. Normal bowel sounds.   Skin/Extremities: +edema, gauze  Peripheral pulses: 2+ radial, dorsalis pedis and posterior tibialis bilaterally.  Neurologic: No gross cranial nerve or focal neurologic deficits.  Psychiatric: Appropriate, normal affect.        Assessment:     1. Paroxysmal atrial fibrillation    2. Orthostatic hypotension    3. Essential hypertension    4. Aortic atherosclerosis    5. Chronic diastolic heart failure    6. Typical atrial flutter    7. Chronic heart failure with preserved ejection fraction    8. RBBB          Plan:       78 y.o. female with PMH of COPD, HFpEF (60% on 5/4/22), pAF (eliquis), HTN, HLD presenting to clinic for follow up.        #HFpEF:  Seems volume up today. Reports good response to PO diuretics at home. Repeat labs prior to next visit.)  - continue current management  - BP control  - Repeat echo     #pAFib:  CV 6, HB 1  - AC with eliquis 5mg BID  - continue rate control with metoprolol suc 25 mg qd       #HTN:  BP at goal  - Continue current management  - keep a BP diary for next visit     #HLD:  Last lipid panel in 6/2021: chol 140, tri 128, HDL 65, LDL 66      # PVD- compression stocking, diuretics  Weight loss  Exercise    Continue with current medical plan and lifestyle changes.  Return sooner for concerns or questions. If symptoms persist go to the ED  I have reviewed all pertinent data on this patient       She expressed verbal understanding and agreed with the plan      Follow up as scheduled. Return sooner for concerns or questions      I have reviewed the patient's medical history in detail and updated the computerized patient record.    Orders Placed This Encounter    Procedures    Comprehensive Metabolic Panel     Standing Status:   Future     Standing Expiration Date:   2/3/2026     Order Specific Question:   Send normal result to authorizing provider's In Basket if patient is active on MyChart:     Answer:   Yes    Magnesium     Standing Status:   Future     Standing Expiration Date:   2/3/2026     Order Specific Question:   Send normal result to authorizing provider's In Basket if patient is active on MyChart:     Answer:   Yes    Ambulatory referral/consult to [facility]IN[facility] Palliative Care     Standing Status:   Future     Standing Expiration Date:   12/5/2025     Referral Priority:   Routine     Referral Type:   Consultation     Requested Specialty:   Palliative Medicine     Number of Visits Requested:   1    Echo     Standing Status:   Future     Standing Expiration Date:   11/5/2025     Order Specific Question:   Release to patient     Answer:   Immediate       Follow up as scheduled. Return sooner for concerns or questions            She expressed verbal understanding and agreed with the plan        Patient's Medications   New Prescriptions    No medications on file   Previous Medications    ACETAMINOPHEN (TYLENOL) 500 MG TABLET        ALBUTEROL (PROVENTIL HFA) 90 MCG/ACTUATION INHALER    Inhale 2 puffs into the lungs every 6 (six) hours as needed for Wheezing. Rescue    ALBUTEROL (PROVENTIL/VENTOLIN HFA) 90 MCG/ACTUATION INHALER    2 puffs as needed Inhalation every 6 hrs    ASPIRIN (ECOTRIN) 81 MG EC TABLET    Take 81 mg by mouth once daily.    BD POSIFLUSH NORMAL SALINE 0.9 INJECTION        BIOTIN 1 MG TABLET    Take 1,000 mcg by mouth once daily.    BUDESONIDE-GLYCOPYR-FORMOTEROL (BREZTRI AEROSPHERE) 160-9-4.8 MCG/ACTUATION HFAA    Inhale 2 puffs into the lungs 2 (two) times daily.    BUSPIRONE (BUSPAR) 5 MG TAB    Take 5 mg by mouth daily as needed (anxiety).    FLUDROCORTISONE (FLORINEF) 0.1 MG TAB    2 tablets Orally Once a day    FLUTICASONE PROPIONATE (FLONASE) 50  MCG/ACTUATION NASAL SPRAY    1 spray by Each Nostril route daily as needed for Rhinitis or Allergies.    FUROSEMIDE (LASIX) 20 MG TABLET    Take 20 mg by mouth.    INHALATION SPACING DEVICE    Use as directed for inhalation.    MAGNESIUM HYDROXIDE 400 MG/5 ML (MILK OF MAGNESIA) 400 MG/5 ML SUSP    30 ml as neded Orally once daily    MECLIZINE (ANTIVERT) 12.5 MG TABLET    Take 12.5 mg by mouth 3 (three) times daily.    METOPROLOL SUCCINATE (TOPROL-XL) 25 MG 24 HR TABLET    Take ½ tablet (12.5 mg total) by mouth once daily.    PANTOPRAZOLE (PROTONIX) 40 MG TABLET    Take 1 tablet (40 mg total) by mouth 2 (two) times daily before meals.    POLYETHYLENE GLYCOL (MIRALAX) 17 GRAM/DOSE POWDER        SENNA-DOCUSATE 8.6-50 MG (PERICOLACE) 8.6-50 MG PER TABLET    1 tablet Orally Twice a day    TRAZODONE (DESYREL) 50 MG TABLET    Take 0.5 tablets (25 mg total) by mouth nightly as needed for Insomnia (Anxiety or insomnia).    VITAMIN D (VITAMIN D3) 1000 UNITS TAB    Take 2 tablets (2,000 Units total) by mouth once daily.   Modified Medications    No medications on file   Discontinued Medications    No medications on file        Valerio Aquino MD  Cardiovascular Disease [facility]

## 2024-11-05 ENCOUNTER — OFFICE VISIT (OUTPATIENT)
Dept: CARDIOLOGY | Facility: CLINIC | Age: 79
End: 2024-11-05
Payer: MEDICARE

## 2024-11-05 ENCOUNTER — TELEPHONE (OUTPATIENT)
Dept: HEMATOLOGY/ONCOLOGY | Facility: CLINIC | Age: 79
End: 2024-11-05
Payer: MEDICARE

## 2024-11-05 VITALS
BODY MASS INDEX: 25.95 KG/M2 | HEIGHT: 64 IN | OXYGEN SATURATION: 96 % | HEART RATE: 79 BPM | SYSTOLIC BLOOD PRESSURE: 102 MMHG | DIASTOLIC BLOOD PRESSURE: 66 MMHG | WEIGHT: 152 LBS

## 2024-11-05 DIAGNOSIS — I48.3 TYPICAL ATRIAL FLUTTER: ICD-10-CM

## 2024-11-05 DIAGNOSIS — I95.1 ORTHOSTATIC HYPOTENSION: ICD-10-CM

## 2024-11-05 DIAGNOSIS — I50.32 CHRONIC DIASTOLIC HEART FAILURE: ICD-10-CM

## 2024-11-05 DIAGNOSIS — I70.0 AORTIC ATHEROSCLEROSIS: ICD-10-CM

## 2024-11-05 DIAGNOSIS — I10 ESSENTIAL HYPERTENSION: ICD-10-CM

## 2024-11-05 DIAGNOSIS — I45.10 RBBB: Chronic | ICD-10-CM

## 2024-11-05 DIAGNOSIS — I50.32 CHRONIC HEART FAILURE WITH PRESERVED EJECTION FRACTION: ICD-10-CM

## 2024-11-05 DIAGNOSIS — I48.0 PAROXYSMAL ATRIAL FIBRILLATION: Primary | ICD-10-CM

## 2024-11-05 PROCEDURE — 99999 PR PBB SHADOW E&M-EST. PATIENT-LVL IV: CPT | Mod: PBBFAC,,, | Performed by: STUDENT IN AN ORGANIZED HEALTH CARE EDUCATION/TRAINING PROGRAM

## 2024-11-06 ENCOUNTER — TELEPHONE (OUTPATIENT)
Dept: HEMATOLOGY/ONCOLOGY | Facility: CLINIC | Age: 79
End: 2024-11-06
Payer: MEDICARE

## 2024-11-06 NOTE — TELEPHONE ENCOUNTER
----- Message from Argelia sent at 11/6/2024  3:19 PM CST -----  .Type:  Needs Medical Advice    Who Called: pt daughter Diamond    Would the patient rather a call back or a response via [facility]? Call back  Best Call Back Number:   Additional Information:     Pt daughter would like a call back to get pt appt changed to virtual on 11/8

## 2024-11-07 ENCOUNTER — TELEPHONE (OUTPATIENT)
Dept: HEMATOLOGY/ONCOLOGY | Facility: CLINIC | Age: 79
End: 2024-11-07
Payer: MEDICARE

## 2024-11-08 ENCOUNTER — TELEPHONE (OUTPATIENT)
Dept: HEMATOLOGY/ONCOLOGY | Facility: CLINIC | Age: 79
End: 2024-11-08
Payer: MEDICARE

## 2024-11-08 NOTE — TELEPHONE ENCOUNTER
Spoke to pt daughter about her moms virtual appt today. Pt is being released from [facility] today and they are getting ready to bring her home. She asked for her to be rescheduled to Wednesday next week and she will see the time in pts mychart.

## 2024-11-11 ENCOUNTER — PATIENT MESSAGE (OUTPATIENT)
Dept: PULMONOLOGY | Facility: CLINIC | Age: 79
End: 2024-11-11
Payer: MEDICARE

## 2024-11-12 ENCOUNTER — TELEPHONE (OUTPATIENT)
Dept: HEMATOLOGY/ONCOLOGY | Facility: CLINIC | Age: 79
End: 2024-11-12
Payer: MEDICARE

## 2024-11-12 NOTE — PROGRESS NOTES
Palliative Medicine Clinic Note - [facility]      Consult Requested By: Dr. Valerio Salinas    Primary Care Physician:   Celia Carlisle MD (Inactive)    Reason for Consult: Advance care planning and symptom management in the setting of multiple recent hospitalizations.    The patient location is: [place], [place]  The chief complaint leading to consultation is: significant weakness and shortness of breath after long hospitalization    Visit type: audiovisual    Face to Face time with patient: 45 minutes  110 minutes of total time spent on the encounter, which includes face to face time and non-face to face time preparing to see the patient (eg, review of tests), Obtaining and/or reviewing separately obtained history, Documenting clinical information in the electronic or other health record, Independently interpreting results (not separately reported) and communicating results to the patient/family/caregiver, or Care coordination (not separately reported).     Each patient to whom he or she provides medical services by telemedicine is:  (1) informed of the relationship between the physician and patient and the respective role of any other health care provider with respect to management of the patient; and (2) notified that he or she may decline to receive medical services by telemedicine and may withdraw from such care at any time.    ASSESSMENT/PLAN:     Ms. Misa Barajas is a 79 year old woman with relevant history of CKDIII, kidney stones, COPD on 2L home oxygen, HFpEF, pAF on apixaban, HTN, HLD, tobacco use presenting to palliative care clinic for management of symptoms and advance care planning. Also with remote hx of breast cancer s/p definitive chemo with resulting polyneuropathy.    Kaitlynn had a recent hospitalization for gallstone pancreatitis treated by ERCP with sphincterotomy, complicated by Pasteurella bacteremia requiring second hospitalization. She completed a stay in SNF and returned home to  her house where her daughters have moved in to help her. She has unfortunately experienced significant loss of function due ot this severe insult to her body. She is slowly recovering with the help of home PT/OT and home health. Today Kaitlynn has relatively controlled symptoms. She endorses breathlessness and generalized weakness as her biggest issues. She also endorses difficulty with sleep. She is independent in ADLs, uses a walker and wheelchair around the house.    Overall, I am hopeful that with the support of her family and health services, Kaitlynn will continue to make slow improvement. We will check back in with each other after she has completed her home health services in early January.     Plan/Recommendations:  #Generalized weakness  #Breathlessness - now on 2LNC all the time, previously was on oxygen only as needed at home. Completing all ADLs, but is now dependent on her children for cooking, cleaning. Home PT/OT are scheduled for the next 7 weeks. Pulmonology has set expectations with family that recovery will be slow and steady, but that her breathing will improve with her physical strength.     Advance Care Planning   Advance Directives:   Living Will: No ( is coming to complete paperwork later this week)    [place]: No    Do Not Resuscitate Status: Yes    Medical Power of : No ( is coming to complete later this week)        Oral Declaration: No    Agent's Name:  Bill Marsh   Agent's Contact Number:  [phone]    Decision Making:  Patient answered questions and Family answered questions  Goals of Care: The patient and family endorses that what is most important right now is to focus on spending time at home, remaining as independent as possible, and improvement in condition but with limits to invasive therapies    Accordingly, we have decided that the best plan to meet the patient's goals includes continuing therapy and recovery at home.     Of note, they have a  " coming to complete mPOA and a living will later this week, so these documents were not done during this appointment.     Kaitlynn says today that she wants DNR, would accept a short trial of mechanical ventilation and temporary artificial nutrition. She would not accept permanent life support (PEG, trach). "I don't want to live a life as a vegetable".     Understanding of Disease and Illness Trajectory: Patient and Family  has  good understanding of her illness, they can benefit from continued education on what to expect in the future. They have reasonable expectations for      Follow up: 2nd week of January after completion of home therapy    Plan discussed with patient and family    SUBJECTIVE:     History of Present Illness / Interval History:  Ms. Misa Barajas is a 79 year old woman with relevant history of CKDIII, kidney stones, COPD on 2L home oxygen, HFpEF, pAF on apixaban, HTN, HLD, tobacco use presenting to palliative care clinic for management of symptoms and advance care planning.     Discussed the role of palliative care, in that we can be helpful in helping patients feel better, aid in communication, and planning for future difficult decisions.     Review of Symptoms      Symptom Assessment (ESAS 0-10 Scale)  Pain:  0  Dyspnea:  7  Anxiety:  0  Nausea:  0  Depression:  3  Anorexia:  3  Fatigue:  0  Insomnia:  7  Restlessness:  0  Agitation:  0     CAM / Delirium:  Negative  Constipation:  Negative  Diarrhea:  Negative      Performance Status:  60    Living Arrangements:  Lives with family    Psychosocial/Cultural:   See Palliative Psychosocial Note: No  Social Issues Identified: Coping deficit pt/family, New Diagnosis/Trauma, and Mental Health  Bereavement Risk: No: Negative/Positive Depression screening: "In the past two weeks, how often have    you   felt down, depressed, or hopeless?"    Caregiver Needs Discussed. Caregiver Distress: Yes: Need for respite, Intensity of family caregiving, " Caregiver Burnout Risk, and Caregiver support and community resources discussed.    Cultural: no needs identified  **Primary  to Follow**  Palliative Care  Consult: No     Time-Based Charting:  Yes  Chart Review: 45 minutes  Face to Face: 45 minutes  Advance Care Plannin minutes    Total Time Spent: 110 minutes      Active Diagnoses & Disease History:  Chronic hypoxic respiratory failure - on 2LNC home oxygen  COPD  Diastolic HF  Orthostatic hypotension  Atrial flutter on anticoagulation  Nephrolithiasis  CKD3 - c/b secondary hyperparathyroidism  Osteoporosis  Gallstone pancreatitis s/p ERCP with sphincterotomy, c/b Pasteurella bacteremia  Bilateral venous stasis edema  Tobacco use  Remote hx breast cancer s/p chemotherapy with resulting polyneuropathy    Previous experience or exposure to a serious illness: yes    Medications:    Current Outpatient Medications:     acetaminophen (TYLENOL) 500 MG tablet, , Disp: , Rfl:     albuterol (PROVENTIL HFA) 90 mcg/actuation inhaler, Inhale 2 puffs into the lungs every 6 (six) hours as needed for Wheezing. Rescue, Disp: 18 g, Rfl: 3    albuterol (PROVENTIL/VENTOLIN HFA) 90 mcg/actuation inhaler, 2 puffs as needed Inhalation every 6 hrs, Disp: , Rfl:     albuterol sulfate (PROAIR RESPICLICK) 90 mcg/actuation inhaler, 2 puffs as needed Inhalation every 6 hrs 30 days, Disp: 1 each, Rfl: 0    aspirin (ECOTRIN) 81 MG EC tablet, Take 81 mg by mouth once daily., Disp: , Rfl:     aspirin (ECOTRIN) 81 MG EC tablet, 1 tablet Orally Once a day 30 days, Disp: 30 tablet, Rfl: 0    BD POSIFLUSH NORMAL SALINE 0.9 injection, , Disp: , Rfl:     biotin 1 mg Cap, 1 capsule Orally Once a day 30 days, Disp: 30 capsule, Rfl: 0    biotin 1 mg tablet, Take 1,000 mcg by mouth once daily., Disp: , Rfl:     budesonide-glycopyr-formoterol (BREZTRI AEROSPHERE) 160-9-4.8 mcg/actuation HFAA, Inhale 2 puffs into the lungs 2 (two) times daily., Disp: 32.1 g, Rfl: 3    busPIRone  (BUSPAR) 5 MG Tab, Take 5 mg by mouth daily as needed (anxiety)., Disp: , Rfl:     busPIRone (BUSPAR) 5 MG Tab, 1 tablet Orally once daily as needed 30 days, Disp: 30 tablet, Rfl: 0    fludrocortisone (FLORINEF) 0.1 mg Tab, 2 tablets Orally Once a day, Disp: , Rfl:     fludrocortisone (FLORINEF) 0.1 mg Tab, 2 tablets Orally Once a day 30 days, Disp: 60 tablet, Rfl: 1    fluticasone propionate (FLONASE) 50 mcg/actuation nasal spray, 1 spray by Each Nostril route daily as needed for Rhinitis or Allergies., Disp: , Rfl:     furosemide (LASIX) 20 MG tablet, Take 20 mg by mouth., Disp: , Rfl:     furosemide (LASIX) 20 MG tablet, 1 tablet Orally Once a day 30 days, Disp: 30 tablet, Rfl: 0    inhalation spacing device, Use as directed for inhalation., Disp: 1 each, Rfl: 0    magnesium hydroxide 400 mg/5 ml (MILK OF MAGNESIA) 400 mg/5 mL Susp, 30 ml as neded Orally once daily, Disp: , Rfl:     meclizine (ANTIVERT) 12.5 mg tablet, Take 12.5 mg by mouth 3 (three) times daily., Disp: , Rfl:     metoprolol succinate (TOPROL XL) 25 MG 24 hr tablet, 1/2 tablet Orally at bedtime 30 days, Disp: 30 tablet, Rfl: 0    metoprolol succinate (TOPROL-XL) 25 MG 24 hr tablet, Take ½ tablet (12.5 mg total) by mouth once daily., Disp: 45 tablet, Rfl: 3    ondansetron (ZOFRAN) 4 MG tablet, 1 tablet as needed for nausea Orally every 8 hours 7 days, Disp: 21 tablet, Rfl: 0    pantoprazole (PROTONIX) 40 MG tablet, Take 1 tablet (40 mg total) by mouth 2 (two) times daily before meals., Disp: , Rfl:     polyethylene glycol (MIRALAX) 17 gram/dose powder, , Disp: , Rfl:     senna-docusate 8.6-50 mg (PERICOLACE) 8.6-50 mg per tablet, 1 tablet Orally Twice a day, Disp: , Rfl:     traZODone (DESYREL) 50 MG tablet, Take 0.5 tablets (25 mg total) by mouth nightly as needed for Insomnia (Anxiety or insomnia)., Disp: , Rfl:     traZODone (DESYREL) 50 MG tablet, 1/2 tablet as needed Orally at bedtime 30 days, Disp: 30 tablet, Rfl: 0    triamcinolone  acetonide 0.1% (KENALOG) 0.1 % cream, Apply externally to affected area twice a day until clear, Disp: 80 g, Rfl: 1    vitamin D (VITAMIN D3) 1000 units Tab, Take 2 tablets (2,000 Units total) by mouth once daily., Disp: 180 tablet, Rfl: 3    vitamin D (VITAMIN D3) 1000 units Tab, 2 tablets Orally Once a day 30 days, Disp: 60 tablet, Rfl: 0    External  database queried on 11/13/24 by Mary Velasquez.   The results reviewed and considered with the clinical data in the decision whether or not to prescribe a controlled substance.    Past Medical History:   Diagnosis Date    Acute biliary pancreatitis 07/27/2024    Anemia 07/12/2024    Aortic atherosclerosis     Arthritis     knee joint pain    Asthma-COPD overlap syndrome 08/22/2022    home o2    Breast cancer 2002    left breast & lymph nodes-s/p sx with chemo    Cataracts, bilateral     Chronic diastolic heart failure 12/24/2019    Chronic kidney disease, stage 3     Class 1 obesity due to excess calories with serious comorbidity and body mass index (BMI) of 30.0 to 30.9 in adult 05/16/2024    History of chemotherapy     last treatment 12/2002 (had 8 treatments)    Hyperlipidemia 11/20/2016    Hypertension     Lymphedema of both lower extremities 01/25/2022    Nephrolithiasis 06/02/2014    Osteoporosis     Paroxysmal atrial fibrillation 06/07/2021    Renal osteodystrophy 01/14/2016    Venous stasis dermatitis of both lower extremities 01/25/2022    Vitamin D insufficiency      Past Surgical History:   Procedure Laterality Date    ABLATION, ATRIAL FLUTTER, TYPICAL N/A 6/17/2024    Procedure: Ablation, Atrial Flutter, Typical;  Surgeon: Taz Church MD;  Location: [facility] EP LAB;  Service: Cardiology;  Laterality: N/A;  AFL, RFA, LORENE, MAKAYLA (cx if SR), anes, MB, 3 Prep    BREAST BIOPSY Left 2002    core bx, +    BREAST BIOPSY Right 2018    core    BREAST BIOPSY Right 2019    BREAST LUMPECTOMY Left 2002    CYSTOSCOPY  4/28/2022    Procedure: CYSTOSCOPY;  Surgeon:  Vik Ware MD;  [facility];  Service: Urology;;    CYSTOSCOPY  5/30/2022    Procedure: CYSTOSCOPY;  Surgeon: Bonnie Knutson MD;  [facility];  Service: Urology;;    ECHOCARDIOGRAM,TRANSESOPHAGEAL N/A 6/17/2024    Procedure: Transesophageal echo (MAKAYLA) intra-procedure log documentation;  Surgeon: Nestor Martinez MD;  Location: [facility] EP LAB;  Service: Cardiology;  Laterality: N/A;    ERCP N/A 7/30/2024    Procedure: ERCP (ENDOSCOPIC RETROGRADE CHOLANGIOPANCREATOGRAPHY);  Surgeon: Sierra Cotto MD;  Location: [facility] ENDO (2ND FLR);  Service: Endoscopy;  Laterality: N/A;    LASER LITHOTRIPSY  5/30/2022    Procedure: LITHOTRIPSY, USING LASER;  Surgeon: Bonnie Knutson MD;  [facility];  Service: Urology;;    LITHOTRIPSY      MASTECTOMY Left 06/2002    left-& lymph node dissection    REPLACEMENT OF STENT Right 5/30/2022    Procedure: REPLACEMENT, STENT;  Surgeon: Bonnie Knutson MD;  [facility];  Service: Urology;  Laterality: Right;    RETROGRADE PYELOGRAPHY Right 4/28/2022    Procedure: PYELOGRAM, RETROGRADE;  Surgeon: Vik Ware MD;  [facility];  Service: Urology;  Laterality: Right;    ROBOT-ASSISTED LAPAROSCOPIC PARTIAL NEPHRECTOMY USING DA JESÚS XI Right 3/11/2021    Procedure: XI ROBOTIC NEPHRECTOMY, PARTIAL;  Surgeon: Taz Ortega MD;  Location: [facility] OR 2ND FLR;  Service: Urology;  Laterality: Right;  4hr/ gen with regional  [facility] confirmation:  [id number] for 11:15am case NC    URETEROSCOPIC REMOVAL OF URETERIC CALCULUS Right 5/30/2022    Procedure: REMOVAL, CALCULUS, URETER, URETEROSCOPIC;  Surgeon: Bonnie Knutson MD;  [facility];  Service: Urology;  Laterality: Right;    ureteroscopy Bilateral     6.14    URETEROSCOPY Right 5/30/2022    Procedure: URETEROSCOPY;  Surgeon: Bonnie Knutson MD;  Location: [facility] OR [address] [place] [address];  Service: Urology;  Laterality: Right;      Family History   Problem Relation Name Age of Onset    Bone cancer Mother Bibi     Breast cancer Mother Bibi     Arthritis Mother Bibi         Breast Cancer    Breast cancer Sister      Cancer Father Edaudi         Asbestos cancer    No Known Problems Brother      Fibroids Daughter      Crohn's disease Daughter      No Known Problems Daughter      Arthritis Sister          Breast Cancer    Anesthesia problems Neg Hx      Glaucoma Neg Hx       Review of patient's allergies indicates:   Allergen Reactions    Adhesive tape-silicones     Adhesive Rash     Pt states she removed a LATEX bandaid and the skin beneath was swollen and red. No other latex causes a reaction.       OBJECTIVE:     Vitals: not done due to video visit  Physical Exam  Constitutional:       General: She is not in acute distress.     Comments: Thin, chronically ill appearing elderly woman able to operate video call independently.   HENT:      Head: Normocephalic and atraumatic.      Mouth/Throat:      Mouth: Mucous membranes are moist.   Eyes:      General: No scleral icterus.     Extraocular Movements: Extraocular movements intact.   Pulmonary:      Comments: NC in place, on 2L. Speaking in full sentences.  Neurological:      Mental Status: She is alert.      Comments: Oriented and able to converse about complex medical decisions.   Psychiatric:         Mood and Affect: Mood normal.         Behavior: Behavior normal.      Comments: No SI. Endorses some anhedonia, insomnia.       Labs:  CBC:   WBC   Date Value Ref Range Status   09/02/2024 9.56 3.90 - 12.70 K/uL Final     Hemoglobin   Date Value Ref Range Status   09/02/2024 9.0 (L) 12.0 - 16.0 g/dL Final     POC Hematocrit   Date Value Ref Range Status   07/26/2024 33 (L) 36 - 54 %PCV Final     Hematocrit   Date Value Ref Range Status   09/02/2024 29.3 (L) 37.0 - 48.5 % Final     MCV   Date Value Ref Range Status   09/02/2024 93 82 - 98 fL Final     Platelets   Date Value Ref Range  Status   09/02/2024 251 150 - 450 K/uL Final     Lab Results   Component Value Date    CREATININE 1.2 09/03/2024    BUN 18 09/03/2024     09/03/2024    K 3.3 (L) 09/03/2024     09/03/2024    CO2 25 09/03/2024      LFT:   Lab Results   Component Value Date    AST 21 09/03/2024    ALKPHOS 105 09/03/2024    BILITOT 0.7 09/03/2024     Albumin:   Albumin   Date Value Ref Range Status   09/03/2024 1.8 (L) 3.5 - 5.2 g/dL Final     Protein:   Total Protein   Date Value Ref Range Status   09/03/2024 5.1 (L) 6.0 - 8.4 g/dL Final     Radiology:  X-Ray Chest 1 View  Narrative: EXAMINATION:  XR CHEST 1 VIEW    CLINICAL HISTORY:  shortness of breath;    TECHNIQUE:  Single frontal view of the chest was performed.    COMPARISON:  08/26/2024 and 07/12/2024    FINDINGS:  The lungs are symmetrically inflated.  There are bilateral small to moderate volume pleural effusions with likely associated compressive atelectasis.  Additionally, there is pulmonary vascular congestion.  No pneumothorax or focal airspace consolidation.  There is stable cardiomegaly.  The mediastinum, osseous and soft tissue structures are within normal limits.  Impression: Findings compatible with pulmonary volume overload with bilateral pleural effusions which are relatively stable and pulmonary interstitial edema.    Electronically signed by: Beatrice Esteves MD  Date:    09/02/2024  Time:    15:40     Leti Velasquez MD  Hospice and Palliative Medicine  [facility]

## 2024-11-13 ENCOUNTER — OFFICE VISIT (OUTPATIENT)
Dept: PALLIATIVE MEDICINE | Facility: CLINIC | Age: 79
End: 2024-11-13
Payer: MEDICARE

## 2024-11-13 ENCOUNTER — PATIENT MESSAGE (OUTPATIENT)
Dept: CARDIOLOGY | Facility: CLINIC | Age: 79
End: 2024-11-13
Payer: MEDICARE

## 2024-11-13 ENCOUNTER — TELEPHONE (OUTPATIENT)
Dept: HEMATOLOGY/ONCOLOGY | Facility: CLINIC | Age: 79
End: 2024-11-13
Payer: MEDICARE

## 2024-11-13 DIAGNOSIS — Z51.5 PALLIATIVE CARE ENCOUNTER: ICD-10-CM

## 2024-11-13 DIAGNOSIS — J96.11 CHRONIC RESPIRATORY FAILURE WITH HYPOXIA: Chronic | ICD-10-CM

## 2024-11-13 DIAGNOSIS — J43.2 CENTRILOBULAR EMPHYSEMA: ICD-10-CM

## 2024-11-13 DIAGNOSIS — I50.32 CHRONIC DIASTOLIC HEART FAILURE: ICD-10-CM

## 2024-11-13 DIAGNOSIS — R54 FRAILTY SYNDROME IN GERIATRIC PATIENT: Primary | ICD-10-CM

## 2024-11-26 ENCOUNTER — PATIENT MESSAGE (OUTPATIENT)
Dept: PULMONOLOGY | Facility: CLINIC | Age: 79
End: 2024-11-26
Payer: MEDICARE

## 2024-11-26 DIAGNOSIS — J44.89 ASTHMA-COPD OVERLAP SYNDROME: ICD-10-CM

## 2024-11-26 RX ORDER — BUDESONIDE, GLYCOPYRROLATE, AND FORMOTEROL FUMARATE 160; 9; 4.8 UG/1; UG/1; UG/1
2 AEROSOL, METERED RESPIRATORY (INHALATION) 2 TIMES DAILY
Qty: 32.1 G | Refills: 3 | Status: CANCELLED | OUTPATIENT
Start: 2024-11-22

## 2024-11-26 RX ORDER — BUDESONIDE, GLYCOPYRROLATE, AND FORMOTEROL FUMARATE 160; 9; 4.8 UG/1; UG/1; UG/1
2 AEROSOL, METERED RESPIRATORY (INHALATION) 2 TIMES DAILY
Qty: 32.1 G | Refills: 3 | Status: SHIPPED | OUTPATIENT
Start: 2024-11-26

## 2024-12-12 ENCOUNTER — PATIENT OUTREACH (OUTPATIENT)
Dept: EMERGENCY MEDICINE | Facility: HOSPITAL | Age: 79
End: 2024-12-12
Payer: MEDICARE

## 2024-12-12 NOTE — PROGRESS NOTES
Successful follow up message to patient:    Heladio Ms. Bynum,    I just wanted to follow up with you to see how you are doing. If you need assistance with anything you can reply to this message. My email is [email or web address]. My number is [phone]. I hope all is well with you and your family.    Sincerely,  SUSHILA Jo  Ed Navigator    Patient last er visit was 7/11/2024. Navigator to close encounter.

## 2024-12-17 ENCOUNTER — OFFICE VISIT (OUTPATIENT)
Dept: PRIMARY CARE CLINIC | Facility: CLINIC | Age: 79
End: 2024-12-17
Payer: MEDICARE

## 2024-12-17 VITALS
OXYGEN SATURATION: 97 % | DIASTOLIC BLOOD PRESSURE: 60 MMHG | HEART RATE: 68 BPM | RESPIRATION RATE: 16 BRPM | BODY MASS INDEX: 25.93 KG/M2 | WEIGHT: 151.88 LBS | SYSTOLIC BLOOD PRESSURE: 132 MMHG | HEIGHT: 64 IN

## 2024-12-17 DIAGNOSIS — D50.0 IRON DEFICIENCY ANEMIA DUE TO CHRONIC BLOOD LOSS: ICD-10-CM

## 2024-12-17 DIAGNOSIS — D69.2 SENILE PURPURA: ICD-10-CM

## 2024-12-17 DIAGNOSIS — E88.09 HYPOALBUMINEMIA: ICD-10-CM

## 2024-12-17 DIAGNOSIS — Z76.89 ENCOUNTER TO ESTABLISH CARE WITH NEW DOCTOR: Primary | ICD-10-CM

## 2024-12-17 DIAGNOSIS — J96.11 CHRONIC RESPIRATORY FAILURE WITH HYPOXIA: Chronic | ICD-10-CM

## 2024-12-17 DIAGNOSIS — I50.32 CHRONIC DIASTOLIC HEART FAILURE: ICD-10-CM

## 2024-12-17 DIAGNOSIS — M81.0 AGE-RELATED OSTEOPOROSIS WITHOUT CURRENT PATHOLOGICAL FRACTURE: ICD-10-CM

## 2024-12-17 DIAGNOSIS — I95.9 HYPOTENSION, UNSPECIFIED HYPOTENSION TYPE: ICD-10-CM

## 2024-12-17 DIAGNOSIS — N18.31 STAGE 3A CHRONIC KIDNEY DISEASE: Chronic | ICD-10-CM

## 2024-12-17 DIAGNOSIS — J43.2 CENTRILOBULAR EMPHYSEMA: ICD-10-CM

## 2024-12-17 LAB
ALBUMIN SERPL BCP-MCNC: 2.7 G/DL (ref 3.5–5.2)
ALP SERPL-CCNC: 111 U/L (ref 40–150)
ALT SERPL W/O P-5'-P-CCNC: 12 U/L (ref 10–44)
ANION GAP SERPL CALC-SCNC: 10 MMOL/L (ref 8–16)
AST SERPL-CCNC: 27 U/L (ref 10–40)
BILIRUB SERPL-MCNC: 0.5 MG/DL (ref 0.1–1)
BUN SERPL-MCNC: 13 MG/DL (ref 8–23)
CALCIUM SERPL-MCNC: 9.9 MG/DL (ref 8.7–10.5)
CHLORIDE SERPL-SCNC: 105 MMOL/L (ref 95–110)
CO2 SERPL-SCNC: 26 MMOL/L (ref 23–29)
CREAT SERPL-MCNC: 1.4 MG/DL (ref 0.5–1.4)
EST. GFR  (NO RACE VARIABLE): 37.8 ML/MIN/1.73 M^2
GLUCOSE SERPL-MCNC: 97 MG/DL (ref 70–110)
MAGNESIUM SERPL-MCNC: 2 MG/DL (ref 1.6–2.6)
POTASSIUM SERPL-SCNC: 3.2 MMOL/L (ref 3.5–5.1)
PROT SERPL-MCNC: 6.4 G/DL (ref 6–8.4)
SODIUM SERPL-SCNC: 141 MMOL/L (ref 136–145)

## 2024-12-17 PROCEDURE — 3288F FALL RISK ASSESSMENT DOCD: CPT | Mod: CPTII,S$GLB,, | Performed by: STUDENT IN AN ORGANIZED HEALTH CARE EDUCATION/TRAINING PROGRAM

## 2024-12-17 PROCEDURE — 1126F AMNT PAIN NOTED NONE PRSNT: CPT | Mod: CPTII,S$GLB,, | Performed by: STUDENT IN AN ORGANIZED HEALTH CARE EDUCATION/TRAINING PROGRAM

## 2024-12-17 PROCEDURE — 1160F RVW MEDS BY RX/DR IN RCRD: CPT | Mod: CPTII,S$GLB,, | Performed by: STUDENT IN AN ORGANIZED HEALTH CARE EDUCATION/TRAINING PROGRAM

## 2024-12-17 PROCEDURE — 3078F DIAST BP <80 MM HG: CPT | Mod: CPTII,S$GLB,, | Performed by: STUDENT IN AN ORGANIZED HEALTH CARE EDUCATION/TRAINING PROGRAM

## 2024-12-17 PROCEDURE — 1158F ADVNC CARE PLAN TLK DOCD: CPT | Mod: CPTII,S$GLB,, | Performed by: STUDENT IN AN ORGANIZED HEALTH CARE EDUCATION/TRAINING PROGRAM

## 2024-12-17 PROCEDURE — 99215 OFFICE O/P EST HI 40 MIN: CPT | Mod: S$GLB,,, | Performed by: STUDENT IN AN ORGANIZED HEALTH CARE EDUCATION/TRAINING PROGRAM

## 2024-12-17 PROCEDURE — 83735 ASSAY OF MAGNESIUM: CPT | Performed by: STUDENT IN AN ORGANIZED HEALTH CARE EDUCATION/TRAINING PROGRAM

## 2024-12-17 PROCEDURE — 1159F MED LIST DOCD IN RCRD: CPT | Mod: CPTII,S$GLB,, | Performed by: STUDENT IN AN ORGANIZED HEALTH CARE EDUCATION/TRAINING PROGRAM

## 2024-12-17 PROCEDURE — 1101F PT FALLS ASSESS-DOCD LE1/YR: CPT | Mod: CPTII,S$GLB,, | Performed by: STUDENT IN AN ORGANIZED HEALTH CARE EDUCATION/TRAINING PROGRAM

## 2024-12-17 PROCEDURE — 80053 COMPREHEN METABOLIC PANEL: CPT | Performed by: STUDENT IN AN ORGANIZED HEALTH CARE EDUCATION/TRAINING PROGRAM

## 2024-12-17 PROCEDURE — 3075F SYST BP GE 130 - 139MM HG: CPT | Mod: CPTII,S$GLB,, | Performed by: STUDENT IN AN ORGANIZED HEALTH CARE EDUCATION/TRAINING PROGRAM

## 2024-12-17 PROCEDURE — 99999 PR PBB SHADOW E&M-EST. PATIENT-LVL V: CPT | Mod: PBBFAC,,, | Performed by: STUDENT IN AN ORGANIZED HEALTH CARE EDUCATION/TRAINING PROGRAM

## 2024-12-17 RX ORDER — POTASSIUM CHLORIDE 750 MG/1
20 CAPSULE, EXTENDED RELEASE ORAL DAILY
COMMUNITY
Start: 2024-12-05

## 2024-12-17 RX ORDER — FLUDROCORTISONE ACETATE 0.1 MG/1
0.2 TABLET ORAL DAILY
Qty: 180 TABLET | Refills: 3 | Status: SHIPPED | OUTPATIENT
Start: 2024-12-17 | End: 2025-12-17

## 2024-12-17 RX ORDER — FERROUS SULFATE 325(65) MG
325 TABLET ORAL EVERY OTHER DAY
Qty: 45 TABLET | Refills: 2 | Status: SHIPPED | OUTPATIENT
Start: 2024-12-17

## 2024-12-17 RX ORDER — MECLIZINE HCL 12.5 MG 12.5 MG/1
12.5 TABLET ORAL DAILY
Qty: 90 TABLET | Refills: 3 | Status: SHIPPED | OUTPATIENT
Start: 2024-12-17

## 2024-12-17 RX ORDER — FUROSEMIDE 40 MG/1
40 TABLET ORAL DAILY
Qty: 90 TABLET | Refills: 3 | Status: SHIPPED | OUTPATIENT
Start: 2024-12-17 | End: 2025-12-17

## 2024-12-17 RX ORDER — LEVALBUTEROL INHALATION SOLUTION 1.25 MG/3ML
1 SOLUTION RESPIRATORY (INHALATION) EVERY 8 HOURS PRN
COMMUNITY
Start: 2024-10-29

## 2024-12-17 NOTE — ASSESSMENT & PLAN NOTE
Patient was on eliquis. Now stopped since its aflutter and not afib. Iron sat in August was 14. Added iron supplement every other day today.

## 2024-12-17 NOTE — ASSESSMENT & PLAN NOTE
Noted on BL upper extremities. Related to aging and pt is on asa. No signs of major bleeding. Will monitor.

## 2024-12-17 NOTE — PROGRESS NOTES
INTERNAL MEDICINE INITIAL VISIT NOTE      CHIEF COMPLAINT     Chief Complaint   Patient presents with    Establish Care       HPI     Misa Barajas is a 79 y.o.  female who presents with copd with chronic hypoxic respiratory failure (2L via NC, 24/7), diastolic CHF, CKD stage 3a, aflutter (ablation 6/2024, successful, dr. werner), osteoporosis, hx of L breast cancer (2000, s/p left mastectomy and lymphadenectomy, chemo), hx of HTN (now low), drug induced neuropathy, gallstone pancreatitis(still has the gallbladder), nephroloithiasis, partial R nephrectomy (benign tumor) is here to establish care with me.   -no issues today. No falls in the last year. No urinary issues. No trouble sleeping   -only getting 10 meq daily potassium. Not sure if she needed 20 meq or 10 meq.   -anemia. Iron sat in August was 14. Added iron supplement every other day today.     Advance Care Planning     Date: 12/17/2024    Power of   I initiated the process of voluntary advance care planning today and explained the importance of this process to the patient.  I introduced the concept of advance directives to the patient, as well. Then the patient received detailed information about the importance of designating a Health Care Power of  (HCPOA). She was also instructed to communicate with this person about their wishes for future healthcare, should she become sick and lose decision-making capacity. The patient has previously appointed a HCPOA. After our discussion, the patient has decided to complete a HCPOA and has appointed her daughters, health care agent: Marina Gruber and Esther Barajas & health care agent number: [phone]. I encouraged her to communicate with this person about their wishes for future healthcare, should she become sick and lose decision-making capacity.      A total of 5 min was spent on advance care planning, goals of care discussion, emotional support, formulating and communicating  prognosis and exploring burden/benefit of various approaches of treatment. This discussion occurred on a fully voluntary basis with the verbal consent of the patient and/or family.  -she has a HCPOA and living will that they will bring next time      Past Medical History:  Past Medical History:   Diagnosis Date    Acute biliary pancreatitis 07/27/2024    Anemia 07/12/2024    Aortic atherosclerosis     Arthritis     knee joint pain    Asthma-COPD overlap syndrome 08/22/2022    home o2    Breast cancer 2002    left breast & lymph nodes-s/p sx with chemo    Cataracts, bilateral     Chronic diastolic heart failure 12/24/2019    Chronic kidney disease, stage 3     Class 1 obesity due to excess calories with serious comorbidity and body mass index (BMI) of 30.0 to 30.9 in adult 05/16/2024    History of chemotherapy     last treatment 12/2002 (had 8 treatments)    Hyperlipidemia 11/20/2016    Hypertension     Lymphedema of both lower extremities 01/25/2022    Nephrolithiasis 06/02/2014    Osteoporosis     Paroxysmal atrial fibrillation 06/07/2021    Renal osteodystrophy 01/14/2016    Venous stasis dermatitis of both lower extremities 01/25/2022    Vitamin D insufficiency        Past Surgical History:  Past Surgical History:   Procedure Laterality Date    ABLATION, ATRIAL FLUTTER, TYPICAL N/A 6/17/2024    Procedure: Ablation, Atrial Flutter, Typical;  Surgeon: Taz Church MD;  Location: [facility] EP LAB;  Service: Cardiology;  Laterality: N/A;  AFL, RFA, LORENE, MAKAYLA (cx if SR), anes, MB, 3 Prep    BREAST BIOPSY Left 2002    core bx, +    BREAST BIOPSY Right 2018    core    BREAST BIOPSY Right 2019    BREAST LUMPECTOMY Left 2002    CYSTOSCOPY  4/28/2022    Procedure: CYSTOSCOPY;  Surgeon: Vik Ware MD;  Location: [facility] OR [address] [place] [address];  Service: Urology;;    CYSTOSCOPY  5/30/2022    Procedure: CYSTOSCOPY;  Surgeon: Bonnie Knutson MD;  Location: [facility] OR [address] [place] [address];  Service: Urology;;     ECHOCARDIOGRAM,TRANSESOPHAGEAL N/A 6/17/2024    Procedure: Transesophageal echo (MAKAYLA) intra-procedure log documentation;  Surgeon: Nestor Martinez MD;  Location: [facility] EP LAB;  Service: Cardiology;  Laterality: N/A;    ERCP N/A 7/30/2024    Procedure: ERCP (ENDOSCOPIC RETROGRADE CHOLANGIOPANCREATOGRAPHY);  Surgeon: Sierra Cotto MD;  Location: [facility] ENDO (2ND FLR);  Service: Endoscopy;  Laterality: N/A;    LASER LITHOTRIPSY  5/30/2022    Procedure: LITHOTRIPSY, USING LASER;  Surgeon: Bonnie Knutson MD;  [facility];  Service: Urology;;    LITHOTRIPSY      MASTECTOMY Left 06/2002    left-& lymph node dissection    REPLACEMENT OF STENT Right 5/30/2022    Procedure: REPLACEMENT, STENT;  Surgeon: Bonnie Knutson MD;  Location: [facility] OR [address] [place] [address];  Service: Urology;  Laterality: Right;    RETROGRADE PYELOGRAPHY Right 4/28/2022    Procedure: PYELOGRAM, RETROGRADE;  Surgeon: Vik Ware MD;  [facility];  Service: Urology;  Laterality: Right;    ROBOT-ASSISTED LAPAROSCOPIC PARTIAL NEPHRECTOMY USING DA JESÚS XI Right 3/11/2021    Procedure: XI ROBOTIC NEPHRECTOMY, PARTIAL;  Surgeon: Taz Ortega MD;  Location: [facility] OR 2ND FLR;  Service: Urology;  Laterality: Right;  4hr/ gen with regional  [place] confirmation:  [id number] for 11:15am case NC    URETEROSCOPIC REMOVAL OF URETERIC CALCULUS Right 5/30/2022    Procedure: REMOVAL, CALCULUS, URETER, URETEROSCOPIC;  Surgeon: Bonnie Knutson MD;  [facility];  Service: Urology;  Laterality: Right;    ureteroscopy Bilateral     6.14    URETEROSCOPY Right 5/30/2022    Procedure: URETEROSCOPY;  Surgeon: Bonnie Knutson MD;  [facility];  Service: Urology;  Laterality: Right;       Allergies:  Review of patient's allergies indicates:   Allergen Reactions    Adhesive tape-silicones     Adhesive Rash     Pt states she removed a LATEX bandaid and the skin beneath was swollen and red. No other  latex causes a reaction.       Home Medications:  Prior to Admission medications    Medication Sig Start Date End Date Taking? Authorizing Provider   acetaminophen (TYLENOL) 500 MG tablet    Yes Provider, Historical   albuterol (PROVENTIL HFA) 90 mcg/actuation inhaler Inhale 2 puffs into the lungs every 6 (six) hours as needed for Wheezing. Rescue 5/27/24  Yes Celia Carlisle MD   albuterol (PROVENTIL/VENTOLIN HFA) 90 mcg/actuation inhaler 2 puffs as needed Inhalation every 6 hrs   Yes Provider, Historical   albuterol (VENTOLIN HFA) 90 mcg/actuation inhaler Take 1 puff as needed Inhalation every 4 hours 11/12/24  Yes    albuterol sulfate (PROAIR RESPICLICK) 90 mcg/actuation inhaler 2 puffs as needed Inhalation every 6 hrs 30 days 11/7/24  Yes    amoxicillin-clavulanate 500-125mg (AUGMENTIN) 500-125 mg Tab Take 1 tablet by mouth every 12 hours for 7 days 11/12/24  Yes    aspirin (ECOTRIN) 81 MG EC tablet Take 81 mg by mouth once daily.   Yes Provider, Historical   aspirin (ECOTRIN) 81 MG EC tablet 1 tablet Orally Once a day 30 days 11/7/24  Yes    BD POSIFLUSH NORMAL SALINE 0.9 injection  8/19/24  Yes Provider, Historical   biotin 1 mg Cap 1 capsule Orally Once a day 30 days 11/7/24  Yes    biotin 1 mg tablet Take 1,000 mcg by mouth once daily.   Yes Provider, Historical   budesonide-glycopyr-formoterol (BREZTRI AEROSPHERE) 160-9-4.8 mcg/actuation HFAA Inhale 2 puffs into the lungs 2 (two) times daily. 11/26/24  Yes Ryder Qiu MD   busPIRone (BUSPAR) 5 MG Tab Take 5 mg by mouth daily as needed (anxiety). 1/1/24  Yes Provider, Historical   busPIRone (BUSPAR) 5 MG Tab 1 tablet Orally once daily as needed 30 days 11/7/24  Yes    fludrocortisone (FLORINEF) 0.1 mg Tab 2 tablets Orally Once a day   Yes Provider, Historical   fludrocortisone (FLORINEF) 0.1 mg Tab Take 2 tablets (0.2 mg total) by mouth once daily. 11/7/24  Yes    fluticasone propionate (FLONASE) 50 mcg/actuation nasal spray 1 spray by Each  Nostril route daily as needed for Rhinitis or Allergies.   Yes Provider, Historical   furosemide (LASIX) 20 MG tablet Take 20 mg by mouth.   Yes Provider, Historical   furosemide (LASIX) 20 MG tablet 1 tablet Orally Once a day 30 days 11/7/24  Yes    inhalation spacing device Use as directed for inhalation. 10/29/24  Yes Ryder Qiu MD   magnesium hydroxide 400 mg/5 ml (MILK OF MAGNESIA) 400 mg/5 mL Susp 30 ml as neded Orally once daily   Yes Provider, Historical   meclizine (ANTIVERT) 12.5 mg tablet Take 12.5 mg by mouth 3 (three) times daily.   Yes Provider, Historical   metoprolol succinate (TOPROL XL) 25 MG 24 hr tablet 1/2 tablet Orally at bedtime 30 days 11/7/24  Yes    metoprolol succinate (TOPROL-XL) 25 MG 24 hr tablet Take ½ tablet (12.5 mg total) by mouth once daily. 9/24/24 9/24/25 Yes Taz Church MD   ondansetron (ZOFRAN) 4 MG tablet 1 tablet as needed for nausea Orally every 8 hours 7 days 11/7/24  Yes    pantoprazole (PROTONIX) 40 MG tablet Take 1 tablet (40 mg total) by mouth 2 (two) times daily before meals. 9/2/24 9/2/25 Yes Lucero Post MD   polyethylene glycol (MIRALAX) 17 gram/dose powder    Yes Provider, Historical   Saccharomyces boulardii (FLORASTOR) 250 mg capsule 1 capsule Orally every 12 hours 7 day(s) 11/12/24  Yes    senna-docusate 8.6-50 mg (PERICOLACE) 8.6-50 mg per tablet 1 tablet Orally Twice a day   Yes Provider, Historical   traZODone (DESYREL) 50 MG tablet Take 0.5 tablets (25 mg total) by mouth nightly as needed for Insomnia (Anxiety or insomnia). 9/3/24 9/3/25 Yes Lucero Post MD   traZODone (DESYREL) 50 MG tablet 1/2 tablet as needed Orally at bedtime 30 days 11/7/24  Yes    triamcinolone acetonide 0.1% (KENALOG) 0.1 % cream Apply externally to affected area twice a day until clear 11/8/24  Yes    vitamin D (VITAMIN D3) 1000 units Tab Take 2 tablets (2,000 Units total) by mouth once daily. 8/22/23  Yes Celia Carlisle MD   vitamin D (VITAMIN  "D3) 1000 units Tab 2 tablets Orally Once a day 30 days 11/7/24  Yes        Family History:  Family History   Problem Relation Name Age of Onset    Bone cancer Mother Bibi     Breast cancer Mother Bibi     Arthritis Mother Bibi         Breast Cancer    Breast cancer Sister      Cancer Father Maxwell         Asbestos cancer    No Known Problems Brother      Fibroids Daughter      Crohn's disease Daughter      No Known Problems Daughter      Arthritis Sister          Breast Cancer    Anesthesia problems Neg Hx      Glaucoma Neg Hx         Social History:  Social History     Tobacco Use    Smoking status: Former     Current packs/day: 0.00     Average packs/day: 1 pack/day for 50.0 years (50.0 ttl pk-yrs)     Types: Cigarettes     Start date: 1960     Quit date: 5/20/2009     Years since quitting: 15.5    Smokeless tobacco: Former   Substance Use Topics    Alcohol use: No    Drug use: No       Review of Systems:  Review of Systems   Constitutional:  Negative for chills and fever.   HENT:  Positive for rhinorrhea (chronic). Negative for sore throat and trouble swallowing.    Respiratory:  Negative for cough, chest tightness and shortness of breath.    Cardiovascular:  Negative for chest pain.   Gastrointestinal:  Negative for abdominal pain, blood in stool, constipation and diarrhea.   Genitourinary:  Negative for dysuria and hematuria.   Neurological:  Negative for dizziness and headaches.   Psychiatric/Behavioral:  Negative for confusion.        Health Maintainence:   Td done  Flu done  Prevnar done  Pneumovax done  Zoster never got that   MMG Dced   C-SCOPE DCed  DEXA -ordered that  Low Dose CT scan in pts if 55-79 y/o with 30 pack yr smoking hx and currently smoke or have quit within past 15 yrs. Quit 15.5 yrs ago. 50 pack years     PHYSICAL EXAM     /60 (BP Location: Left arm, Patient Position: Sitting)   Pulse 68   Resp 16   Ht 5' 4" (1.626 m)   Wt 68.9 kg (151 lb 14.4 oz)   SpO2 97%   BMI 26.07 " kg/m²     GEN - A+OX4, NAD   HEENT - PERRL. Nml TM BL  Neck - No cervical LAD.   CV - RRR, no m/r   Chest - CTAB, no wheezing or rhonchi  Abd - S/NT/ND/+BS.   Ext - 2+BDP and radial pulses. BL 1+ LE edema.   MSK - in a wheelchair, ambulates with a rollator   Skin - No rash. Bruising present in BL upper extremities    LABS     Previous labs reviewed from 9/2024. Gfr of 45, mag, folate, wbc wnl. Anemia noted.     ASSESSMENT/PLAN     1. Encounter to establish care with new doctor  Assessment & Plan:  Reviewed PMHx, medications, labs, screenings, vaccinations. Advised patient to get the RSV vaccine.       2. Chronic diastolic heart failure  -     Ambulatory referral/consult to Internal Medicine    3. Age-related osteoporosis without current pathological fracture  -     DXA Bone Density Axial Skeleton 1 or more sites; Future; Expected date: 12/17/2024    4. Stage 3a chronic kidney disease  Assessment & Plan:  Renal function has been stable. Avoid nephrotoxins, will monitor.     Orders:  -     Comprehensive Metabolic Panel; Future; Expected date: 12/17/2024  -     Magnesium; Future; Expected date: 12/17/2024    5. Centrilobular emphysema  Assessment & Plan:  Follows with dr. Gutiérrez. Symptoms well controlled on breztri. Ordered a nebulizer today. No recent hospitalizations for copd. On 2 L O2 via NC. Continue. Will monitor    Orders:  -     NEBULIZER FOR HOME USE    6. Chronic respiratory failure with hypoxia  Overview:  POA  Home O2- 2L    Assessment & Plan:  Due to underlying COPD. On 2L O2 via NC. Follows with dr. Gutiérrez. Continue. Will monitor     Orders:  -     NEBULIZER FOR HOME USE    7. Hypotension, unspecified hypotension type  Assessment & Plan:  On florinef for this. BP is stable. Continue, will monitor. Refilled today      8. Senile purpura  Assessment & Plan:  Noted on BL upper extremities. Related to aging and pt is on asa. No signs of major bleeding. Will monitor.       9. Iron deficiency anemia due to  chronic blood loss  Assessment & Plan:  Patient was on eliquis. Now stopped since its aflutter and not afib. Iron sat in August was 14. Added iron supplement every other day today.      10. Hypoalbuminemia  Assessment & Plan:  Noted on previous labs. Encouraged a healthy high protein diet. Will monitor.       Other orders  -     meclizine (ANTIVERT) 12.5 mg tablet; Take 1 tablet (12.5 mg total) by mouth Daily.  Dispense: 90 tablet; Refill: 3  -     furosemide (LASIX) 40 MG tablet; Take 1 tablet (40 mg total) by mouth once daily.  Dispense: 90 tablet; Refill: 3  -     fludrocortisone (FLORINEF) 0.1 mg Tab; Take 2 tablets (0.2 mg total) by mouth once daily.  Dispense: 180 tablet; Refill: 3  -     ferrous sulfate (FEOSOL) 325 mg (65 mg iron) Tab tablet; Take 1 tablet (325 mg total) by mouth every other day.  Dispense: 45 tablet; Refill: 2         RTC in 3 months, sooner if needed and depending on labs.    Isela Langston MD  Department of Internal Medicine  9:02 AM     I spent a total of 40 minutes on the day of the visit.  This includes face to face time and non-face to face time preparing to see the patient (eg, review of tests), obtaining and/or reviewing separately obtained history, documenting clinical information in the electronic or other health record, independently interpreting results and communicating results to the patient/family/caregiver, or care coordinator.

## 2024-12-17 NOTE — ASSESSMENT & PLAN NOTE
Reviewed PMHx, medications, labs, screenings, vaccinations. Advised patient to get the RSV vaccine.

## 2024-12-17 NOTE — ASSESSMENT & PLAN NOTE
Follows with dr. Gutiérrez. Symptoms well controlled on breztri. Ordered a nebulizer today. No recent hospitalizations for copd. On 2 L O2 via NC. Continue. Will monitor

## 2024-12-18 ENCOUNTER — TELEPHONE (OUTPATIENT)
Dept: PRIMARY CARE CLINIC | Facility: CLINIC | Age: 79
End: 2024-12-18
Payer: MEDICARE

## 2024-12-18 PROBLEM — E88.09 HYPOALBUMINEMIA: Status: ACTIVE | Noted: 2024-12-18

## 2024-12-18 NOTE — TELEPHONE ENCOUNTER
Call to Eric to let her know that potassium lab is on the low side. She should be taking 20 meq potassium daily so that's 2 tablets, not one.     Sirisha stated they had only been giving one tablet daily, so she will start giving 2.

## 2024-12-18 NOTE — TELEPHONE ENCOUNTER
----- Message from Arturo Dhillon sent at 12/18/2024  2:53 PM CST -----    ----- Message -----  From: Isela Langston MD  Sent: 12/18/2024   2:10 PM CST  To: [facility] Staff    Pls let daughter know that potassium is on the low side. She should be taking 20 meq potassium daily so that's 2 tablets, not one.

## 2024-12-19 ENCOUNTER — EXTERNAL HOME HEALTH (OUTPATIENT)
Dept: HOME HEALTH SERVICES | Facility: HOSPITAL | Age: 79
End: 2024-12-19
Payer: MEDICARE

## 2024-12-19 ENCOUNTER — TELEPHONE (OUTPATIENT)
Dept: PRIMARY CARE CLINIC | Facility: CLINIC | Age: 79
End: 2024-12-19
Payer: MEDICARE

## 2024-12-19 NOTE — TELEPHONE ENCOUNTER
Call to [facility] to follow-up with nebulizer order. Being processed today, Yared to call patient to  this week. If unable , nebulizer to be mailed.

## 2025-01-01 ENCOUNTER — PATIENT MESSAGE (OUTPATIENT)
Dept: HEMATOLOGY/ONCOLOGY | Facility: CLINIC | Age: 80
End: 2025-01-01
Payer: MEDICARE

## 2025-01-01 ENCOUNTER — HOSPITAL ENCOUNTER (INPATIENT)
Facility: HOSPITAL | Age: 80
LOS: 32 days | DRG: 228 | End: 2025-05-08
Attending: EMERGENCY MEDICINE | Admitting: EMERGENCY MEDICINE
Payer: MEDICARE

## 2025-01-01 VITALS
TEMPERATURE: 98 F | OXYGEN SATURATION: 79 % | DIASTOLIC BLOOD PRESSURE: 24 MMHG | RESPIRATION RATE: 7 BRPM | WEIGHT: 155.44 LBS | SYSTOLIC BLOOD PRESSURE: 43 MMHG | BODY MASS INDEX: 26.54 KG/M2 | HEART RATE: 61 BPM | HEIGHT: 64 IN

## 2025-01-01 DIAGNOSIS — I50.9 CHF (CONGESTIVE HEART FAILURE): ICD-10-CM

## 2025-01-01 DIAGNOSIS — I49.9 ARRHYTHMIA: ICD-10-CM

## 2025-01-01 DIAGNOSIS — I48.91 ATRIAL FIBRILLATION: ICD-10-CM

## 2025-01-01 DIAGNOSIS — R07.9 CHEST PAIN: ICD-10-CM

## 2025-01-01 DIAGNOSIS — Z01.818 ENCOUNTER FOR INTUBATION: ICD-10-CM

## 2025-01-01 DIAGNOSIS — I50.9 CONGESTIVE HEART FAILURE, UNSPECIFIED HF CHRONICITY, UNSPECIFIED HEART FAILURE TYPE: ICD-10-CM

## 2025-01-01 DIAGNOSIS — R07.9 LEFT-SIDED CHEST PAIN: ICD-10-CM

## 2025-01-01 DIAGNOSIS — R00.0 TACHYCARDIA: ICD-10-CM

## 2025-01-01 DIAGNOSIS — I44.2 COMPLETE HEART BLOCK: ICD-10-CM

## 2025-01-01 DIAGNOSIS — R00.1 BRADYCARDIA: Primary | ICD-10-CM

## 2025-01-01 DIAGNOSIS — I49.9 DYSRHYTHMIA: ICD-10-CM

## 2025-01-01 DIAGNOSIS — Z45.2 ENCOUNTER FOR CENTRAL LINE PLACEMENT: ICD-10-CM

## 2025-01-01 LAB
ABSOLUTE EOSINOPHIL (OHS): 0.02 K/UL
ABSOLUTE EOSINOPHIL (OHS): 0.13 K/UL
ABSOLUTE EOSINOPHIL (OHS): 0.14 K/UL
ABSOLUTE EOSINOPHIL (OHS): 0.17 K/UL
ABSOLUTE EOSINOPHIL (OHS): 0.18 K/UL
ABSOLUTE EOSINOPHIL (OHS): 0.21 K/UL
ABSOLUTE EOSINOPHIL (OHS): 0.25 K/UL
ABSOLUTE EOSINOPHIL (OHS): 0.28 K/UL
ABSOLUTE EOSINOPHIL (OHS): 0.29 K/UL
ABSOLUTE EOSINOPHIL (OHS): 0.3 K/UL
ABSOLUTE EOSINOPHIL (OHS): 0.3 K/UL
ABSOLUTE EOSINOPHIL (OHS): 0.31 K/UL
ABSOLUTE EOSINOPHIL (OHS): 0.32 K/UL
ABSOLUTE EOSINOPHIL (OHS): 0.34 K/UL
ABSOLUTE EOSINOPHIL (OHS): 0.35 K/UL
ABSOLUTE EOSINOPHIL (OHS): 0.41 K/UL
ABSOLUTE EOSINOPHIL (OHS): 0.43 K/UL
ABSOLUTE EOSINOPHIL (OHS): 0.44 K/UL
ABSOLUTE EOSINOPHIL (OHS): 0.48 K/UL
ABSOLUTE EOSINOPHIL (OHS): 0.55 K/UL
ABSOLUTE EOSINOPHIL (OHS): 0.57 K/UL
ABSOLUTE MONOCYTE (OHS): 0.68 K/UL (ref 0.3–1)
ABSOLUTE MONOCYTE (OHS): 0.69 K/UL (ref 0.3–1)
ABSOLUTE MONOCYTE (OHS): 0.7 K/UL (ref 0.3–1)
ABSOLUTE MONOCYTE (OHS): 0.71 K/UL (ref 0.3–1)
ABSOLUTE MONOCYTE (OHS): 0.78 K/UL (ref 0.3–1)
ABSOLUTE MONOCYTE (OHS): 0.82 K/UL (ref 0.3–1)
ABSOLUTE MONOCYTE (OHS): 0.93 K/UL (ref 0.3–1)
ABSOLUTE MONOCYTE (OHS): 0.94 K/UL (ref 0.3–1)
ABSOLUTE MONOCYTE (OHS): 0.97 K/UL (ref 0.3–1)
ABSOLUTE MONOCYTE (OHS): 0.97 K/UL (ref 0.3–1)
ABSOLUTE MONOCYTE (OHS): 0.99 K/UL (ref 0.3–1)
ABSOLUTE MONOCYTE (OHS): 0.99 K/UL (ref 0.3–1)
ABSOLUTE MONOCYTE (OHS): 1.05 K/UL (ref 0.3–1)
ABSOLUTE MONOCYTE (OHS): 1.09 K/UL (ref 0.3–1)
ABSOLUTE MONOCYTE (OHS): 1.1 K/UL (ref 0.3–1)
ABSOLUTE MONOCYTE (OHS): 1.12 K/UL (ref 0.3–1)
ABSOLUTE MONOCYTE (OHS): 1.15 K/UL (ref 0.3–1)
ABSOLUTE MONOCYTE (OHS): 1.17 K/UL (ref 0.3–1)
ABSOLUTE MONOCYTE (OHS): 1.23 K/UL (ref 0.3–1)
ABSOLUTE MONOCYTE (OHS): 1.32 K/UL (ref 0.3–1)
ABSOLUTE MONOCYTE (OHS): 1.35 K/UL (ref 0.3–1)
ABSOLUTE MONOCYTE (OHS): 1.4 K/UL (ref 0.3–1)
ABSOLUTE MONOCYTE (OHS): 1.52 K/UL (ref 0.3–1)
ABSOLUTE NEUTROPHIL COUNT (OHS): 10.65 K/UL (ref 1.8–7.7)
ABSOLUTE NEUTROPHIL COUNT (OHS): 11.67 K/UL (ref 1.8–7.7)
ABSOLUTE NEUTROPHIL COUNT (OHS): 11.8 K/UL (ref 1.8–7.7)
ABSOLUTE NEUTROPHIL COUNT (OHS): 3.8 K/UL (ref 1.8–7.7)
ABSOLUTE NEUTROPHIL COUNT (OHS): 4.08 K/UL (ref 1.8–7.7)
ABSOLUTE NEUTROPHIL COUNT (OHS): 4.24 K/UL (ref 1.8–7.7)
ABSOLUTE NEUTROPHIL COUNT (OHS): 4.35 K/UL (ref 1.8–7.7)
ABSOLUTE NEUTROPHIL COUNT (OHS): 4.48 K/UL (ref 1.8–7.7)
ABSOLUTE NEUTROPHIL COUNT (OHS): 4.58 K/UL (ref 1.8–7.7)
ABSOLUTE NEUTROPHIL COUNT (OHS): 4.83 K/UL (ref 1.8–7.7)
ABSOLUTE NEUTROPHIL COUNT (OHS): 4.94 K/UL (ref 1.8–7.7)
ABSOLUTE NEUTROPHIL COUNT (OHS): 4.95 K/UL (ref 1.8–7.7)
ABSOLUTE NEUTROPHIL COUNT (OHS): 5.17 K/UL (ref 1.8–7.7)
ABSOLUTE NEUTROPHIL COUNT (OHS): 5.29 K/UL (ref 1.8–7.7)
ABSOLUTE NEUTROPHIL COUNT (OHS): 5.36 K/UL (ref 1.8–7.7)
ABSOLUTE NEUTROPHIL COUNT (OHS): 5.79 K/UL (ref 1.8–7.7)
ABSOLUTE NEUTROPHIL COUNT (OHS): 6 K/UL (ref 1.8–7.7)
ABSOLUTE NEUTROPHIL COUNT (OHS): 6.2 K/UL (ref 1.8–7.7)
ABSOLUTE NEUTROPHIL COUNT (OHS): 6.25 K/UL (ref 1.8–7.7)
ABSOLUTE NEUTROPHIL COUNT (OHS): 6.28 K/UL (ref 1.8–7.7)
ABSOLUTE NEUTROPHIL COUNT (OHS): 6.84 K/UL (ref 1.8–7.7)
ABSOLUTE NEUTROPHIL COUNT (OHS): 7.17 K/UL (ref 1.8–7.7)
ABSOLUTE NEUTROPHIL COUNT (OHS): 8.19 K/UL (ref 1.8–7.7)
ALBUMIN SERPL BCP-MCNC: 1.9 G/DL (ref 3.5–5.2)
ALBUMIN SERPL BCP-MCNC: 1.9 G/DL (ref 3.5–5.2)
ALBUMIN SERPL BCP-MCNC: 2 G/DL (ref 3.5–5.2)
ALBUMIN SERPL BCP-MCNC: 2.1 G/DL (ref 3.5–5.2)
ALBUMIN SERPL BCP-MCNC: 2.2 G/DL (ref 3.5–5.2)
ALBUMIN SERPL BCP-MCNC: 2.3 G/DL (ref 3.5–5.2)
ALBUMIN SERPL BCP-MCNC: 2.4 G/DL (ref 3.5–5.2)
ALBUMIN SERPL BCP-MCNC: 2.5 G/DL (ref 3.5–5.2)
ALBUMIN SERPL BCP-MCNC: 2.8 G/DL (ref 3.5–5.2)
ALLENS TEST: ABNORMAL
ALLENS TEST: NORMAL
ALP SERPL-CCNC: 100 UNIT/L (ref 40–150)
ALP SERPL-CCNC: 100 UNIT/L (ref 40–150)
ALP SERPL-CCNC: 102 UNIT/L (ref 40–150)
ALP SERPL-CCNC: 103 UNIT/L (ref 40–150)
ALP SERPL-CCNC: 104 UNIT/L (ref 40–150)
ALP SERPL-CCNC: 107 UNIT/L (ref 40–150)
ALP SERPL-CCNC: 108 UNIT/L (ref 40–150)
ALP SERPL-CCNC: 112 UNIT/L (ref 40–150)
ALP SERPL-CCNC: 112 UNIT/L (ref 40–150)
ALP SERPL-CCNC: 116 UNIT/L (ref 40–150)
ALP SERPL-CCNC: 117 UNIT/L (ref 40–150)
ALP SERPL-CCNC: 117 UNIT/L (ref 40–150)
ALP SERPL-CCNC: 139 UNIT/L (ref 40–150)
ALP SERPL-CCNC: 80 UNIT/L (ref 40–150)
ALP SERPL-CCNC: 86 UNIT/L (ref 40–150)
ALP SERPL-CCNC: 90 UNIT/L (ref 40–150)
ALP SERPL-CCNC: 93 UNIT/L (ref 40–150)
ALT SERPL W/O P-5'-P-CCNC: 12 UNIT/L (ref 10–44)
ALT SERPL W/O P-5'-P-CCNC: 13 UNIT/L (ref 10–44)
ALT SERPL W/O P-5'-P-CCNC: 14 UNIT/L (ref 10–44)
ALT SERPL W/O P-5'-P-CCNC: 16 UNIT/L (ref 10–44)
ALT SERPL W/O P-5'-P-CCNC: 17 UNIT/L (ref 10–44)
ALT SERPL W/O P-5'-P-CCNC: 17 UNIT/L (ref 10–44)
ALT SERPL W/O P-5'-P-CCNC: 18 UNIT/L (ref 10–44)
ALT SERPL W/O P-5'-P-CCNC: 19 UNIT/L (ref 10–44)
ALT SERPL W/O P-5'-P-CCNC: 20 UNIT/L (ref 10–44)
ALT SERPL W/O P-5'-P-CCNC: 20 UNIT/L (ref 10–44)
ALT SERPL W/O P-5'-P-CCNC: 21 UNIT/L (ref 10–44)
ALT SERPL W/O P-5'-P-CCNC: 26 UNIT/L (ref 10–44)
ALT SERPL W/O P-5'-P-CCNC: 27 UNIT/L (ref 10–44)
ALT SERPL W/O P-5'-P-CCNC: 28 UNIT/L (ref 10–44)
ALT SERPL W/O P-5'-P-CCNC: 28 UNIT/L (ref 10–44)
ALT SERPL W/O P-5'-P-CCNC: 30 UNIT/L (ref 10–44)
ALT SERPL W/O P-5'-P-CCNC: 33 UNIT/L (ref 10–44)
ANION GAP (OHS): 10 MMOL/L (ref 8–16)
ANION GAP (OHS): 11 MMOL/L (ref 8–16)
ANION GAP (OHS): 12 MMOL/L (ref 8–16)
ANION GAP (OHS): 5 MMOL/L (ref 8–16)
ANION GAP (OHS): 5 MMOL/L (ref 8–16)
ANION GAP (OHS): 6 MMOL/L (ref 8–16)
ANION GAP (OHS): 7 MMOL/L (ref 8–16)
ANION GAP (OHS): 8 MMOL/L (ref 8–16)
ANION GAP (OHS): 9 MMOL/L (ref 8–16)
APTT PPP: 22.7 SECONDS (ref 21–32)
ASCENDING AORTA: 2.61 CM
AST SERPL-CCNC: 22 UNIT/L (ref 11–45)
AST SERPL-CCNC: 23 UNIT/L (ref 11–45)
AST SERPL-CCNC: 25 UNIT/L (ref 11–45)
AST SERPL-CCNC: 29 UNIT/L (ref 11–45)
AST SERPL-CCNC: 31 UNIT/L (ref 11–45)
AST SERPL-CCNC: 32 UNIT/L (ref 11–45)
AST SERPL-CCNC: 32 UNIT/L (ref 11–45)
AST SERPL-CCNC: 33 UNIT/L (ref 11–45)
AST SERPL-CCNC: 34 UNIT/L (ref 11–45)
AST SERPL-CCNC: 35 UNIT/L (ref 11–45)
AST SERPL-CCNC: 37 UNIT/L (ref 11–45)
AST SERPL-CCNC: 37 UNIT/L (ref 11–45)
AST SERPL-CCNC: 46 UNIT/L (ref 11–45)
AST SERPL-CCNC: 47 UNIT/L (ref 11–45)
AST SERPL-CCNC: 51 UNIT/L (ref 11–45)
AST SERPL-CCNC: 61 UNIT/L (ref 11–45)
AST SERPL-CCNC: 65 UNIT/L (ref 11–45)
AST SERPL-CCNC: 72 UNIT/L (ref 11–45)
AV AREA BY CONTINUOUS VTI: 1.8 CM2
AV INDEX (PROSTH): 0.65
AV LVOT MEAN GRADIENT: 2 MMHG
AV LVOT PEAK GRADIENT: 3 MMHG
AV MEAN GRADIENT: 4 MMHG
AV PEAK GRADIENT: 7 MMHG
AV VALVE AREA BY VELOCITY RATIO: 2 CM²
AV VALVE AREA: 1.8 CM2
AV VELOCITY RATIO: 0.69
BACTERIA #/AREA URNS AUTO: ABNORMAL /HPF
BACTERIA #/AREA URNS AUTO: ABNORMAL /HPF
BACTERIA BLD CULT: NORMAL
BACTERIA UR CULT: ABNORMAL
BACTERIA UR CULT: ABNORMAL
BACTERIA UR CULT: NORMAL
BASOPHILS # BLD AUTO: 0.05 K/UL
BASOPHILS # BLD AUTO: 0.06 K/UL
BASOPHILS # BLD AUTO: 0.07 K/UL
BASOPHILS # BLD AUTO: 0.08 K/UL
BASOPHILS # BLD AUTO: 0.09 K/UL
BASOPHILS # BLD AUTO: 0.12 K/UL
BASOPHILS # BLD AUTO: 0.13 K/UL
BASOPHILS NFR BLD AUTO: 0.4 %
BASOPHILS NFR BLD AUTO: 0.4 %
BASOPHILS NFR BLD AUTO: 0.6 %
BASOPHILS NFR BLD AUTO: 0.7 %
BASOPHILS NFR BLD AUTO: 0.8 %
BASOPHILS NFR BLD AUTO: 1 %
BASOPHILS NFR BLD AUTO: 1.1 %
BASOPHILS NFR BLD AUTO: 1.2 %
BASOPHILS NFR BLD AUTO: 1.2 %
BASOPHILS NFR BLD AUTO: 1.3 %
BASOPHILS NFR BLD AUTO: 1.4 %
BASOPHILS NFR BLD AUTO: 1.8 %
BILIRUB DIRECT SERPL-MCNC: 0.2 MG/DL (ref 0.1–0.3)
BILIRUB SERPL-MCNC: 0.4 MG/DL (ref 0.1–1)
BILIRUB SERPL-MCNC: 0.4 MG/DL (ref 0.1–1)
BILIRUB SERPL-MCNC: 0.5 MG/DL (ref 0.1–1)
BILIRUB SERPL-MCNC: 0.6 MG/DL (ref 0.1–1)
BILIRUB SERPL-MCNC: 0.7 MG/DL (ref 0.1–1)
BILIRUB SERPL-MCNC: 0.8 MG/DL (ref 0.1–1)
BILIRUB UR QL STRIP.AUTO: NEGATIVE
BIPAP: 0
BNP SERPL-MCNC: 437 PG/ML (ref 0–99)
BNP SERPL-MCNC: 726 PG/ML (ref 0–99)
BSA FOR ECHO PROCEDURE: 1.85 M2
BSA FOR ECHO PROCEDURE: 1.85 M2
BUN SERPL-MCNC: 29 MG/DL (ref 6–30)
BUN SERPL-MCNC: 29 MG/DL (ref 8–23)
BUN SERPL-MCNC: 31 MG/DL (ref 8–23)
BUN SERPL-MCNC: 32 MG/DL (ref 8–23)
BUN SERPL-MCNC: 33 MG/DL (ref 8–23)
BUN SERPL-MCNC: 34 MG/DL (ref 8–23)
BUN SERPL-MCNC: 34 MG/DL (ref 8–23)
BUN SERPL-MCNC: 35 MG/DL (ref 8–23)
BUN SERPL-MCNC: 36 MG/DL (ref 8–23)
BUN SERPL-MCNC: 36 MG/DL (ref 8–23)
BUN SERPL-MCNC: 37 MG/DL (ref 8–23)
BUN SERPL-MCNC: 37 MG/DL (ref 8–23)
BUN SERPL-MCNC: 38 MG/DL (ref 8–23)
BUN SERPL-MCNC: 39 MG/DL (ref 8–23)
BUN SERPL-MCNC: 39 MG/DL (ref 8–23)
BUN SERPL-MCNC: 40 MG/DL (ref 8–23)
BUN SERPL-MCNC: 41 MG/DL (ref 8–23)
BUN SERPL-MCNC: 42 MG/DL (ref 8–23)
BUN SERPL-MCNC: 43 MG/DL (ref 8–23)
BUN SERPL-MCNC: 43 MG/DL (ref 8–23)
BUN SERPL-MCNC: 44 MG/DL (ref 8–23)
BUN SERPL-MCNC: 45 MG/DL (ref 8–23)
BUN SERPL-MCNC: 46 MG/DL (ref 8–23)
BUN SERPL-MCNC: 47 MG/DL (ref 8–23)
CA-I BLD-SCNC: 1.16 MMOL/L (ref 1.06–1.42)
CALCIUM SERPL-MCNC: 10 MG/DL (ref 8.7–10.5)
CALCIUM SERPL-MCNC: 10.1 MG/DL (ref 8.7–10.5)
CALCIUM SERPL-MCNC: 10.2 MG/DL (ref 8.7–10.5)
CALCIUM SERPL-MCNC: 10.2 MG/DL (ref 8.7–10.5)
CALCIUM SERPL-MCNC: 10.3 MG/DL (ref 8.7–10.5)
CALCIUM SERPL-MCNC: 10.4 MG/DL (ref 8.7–10.5)
CALCIUM SERPL-MCNC: 10.5 MG/DL (ref 8.7–10.5)
CALCIUM SERPL-MCNC: 10.6 MG/DL (ref 8.7–10.5)
CALCIUM SERPL-MCNC: 10.7 MG/DL (ref 8.7–10.5)
CALCIUM SERPL-MCNC: 10.7 MG/DL (ref 8.7–10.5)
CALCIUM SERPL-MCNC: 10.8 MG/DL (ref 8.7–10.5)
CALCIUM SERPL-MCNC: 10.9 MG/DL (ref 8.7–10.5)
CALCIUM SERPL-MCNC: 10.9 MG/DL (ref 8.7–10.5)
CALCIUM SERPL-MCNC: 11.1 MG/DL (ref 8.7–10.5)
CALCIUM SERPL-MCNC: 8.7 MG/DL (ref 8.7–10.5)
CALCIUM SERPL-MCNC: 9 MG/DL (ref 8.7–10.5)
CALCIUM SERPL-MCNC: 9.3 MG/DL (ref 8.7–10.5)
CALCIUM SERPL-MCNC: 9.4 MG/DL (ref 8.7–10.5)
CALCIUM SERPL-MCNC: 9.6 MG/DL (ref 8.7–10.5)
CALCIUM SERPL-MCNC: 9.7 MG/DL (ref 8.7–10.5)
CALCIUM SERPL-MCNC: 9.7 MG/DL (ref 8.7–10.5)
CALCIUM SERPL-MCNC: 9.8 MG/DL (ref 8.7–10.5)
CALCIUM SERPL-MCNC: 9.8 MG/DL (ref 8.7–10.5)
CALCIUM SERPL-MCNC: 9.9 MG/DL (ref 8.7–10.5)
CALCIUM SERPL-MCNC: >19 MG/DL (ref 8.7–10.5)
CHLORIDE SERPL-SCNC: 108 MMOL/L (ref 95–110)
CHLORIDE SERPL-SCNC: 109 MMOL/L (ref 95–110)
CHLORIDE SERPL-SCNC: 110 MMOL/L (ref 95–110)
CHLORIDE SERPL-SCNC: 111 MMOL/L (ref 95–110)
CHLORIDE SERPL-SCNC: 111 MMOL/L (ref 95–110)
CHLORIDE SERPL-SCNC: 112 MMOL/L (ref 95–110)
CHLORIDE SERPL-SCNC: 113 MMOL/L (ref 95–110)
CHLORIDE SERPL-SCNC: 113 MMOL/L (ref 95–110)
CHLORIDE SERPL-SCNC: 114 MMOL/L (ref 95–110)
CHLORIDE SERPL-SCNC: 115 MMOL/L (ref 95–110)
CHLORIDE SERPL-SCNC: 116 MMOL/L (ref 95–110)
CHLORIDE SERPL-SCNC: 117 MMOL/L (ref 95–110)
CHLORIDE SERPL-SCNC: 118 MMOL/L (ref 95–110)
CHLORIDE SERPL-SCNC: 118 MMOL/L (ref 95–110)
CK SERPL-CCNC: 27 U/L (ref 20–180)
CLARITY UR: ABNORMAL
CLARITY UR: CLEAR
CLARITY UR: CLEAR
CO2 SERPL-SCNC: 17 MMOL/L (ref 23–29)
CO2 SERPL-SCNC: 18 MMOL/L (ref 23–29)
CO2 SERPL-SCNC: 19 MMOL/L (ref 23–29)
CO2 SERPL-SCNC: 20 MMOL/L (ref 23–29)
CO2 SERPL-SCNC: 21 MMOL/L (ref 23–29)
CO2 SERPL-SCNC: 22 MMOL/L (ref 23–29)
CO2 SERPL-SCNC: 24 MMOL/L (ref 23–29)
CO2 SERPL-SCNC: 25 MMOL/L (ref 23–29)
CO2 SERPL-SCNC: 27 MMOL/L (ref 23–29)
CO2 SERPL-SCNC: 27 MMOL/L (ref 23–29)
CO2 SERPL-SCNC: 28 MMOL/L (ref 23–29)
CO2 SERPL-SCNC: 29 MMOL/L (ref 23–29)
COLOR UR AUTO: YELLOW
CORRECTED TEMPERATURE (PCO2): 46.4 MMHG
CORRECTED TEMPERATURE (PH): 7.35
CORRECTED TEMPERATURE (PO2): 36.7 MMHG
CORTIS SERPL-MCNC: 10.8 UG/DL (ref 4.3–22.4)
CREAT SERPL-MCNC: 0.8 MG/DL (ref 0.5–1.4)
CREAT SERPL-MCNC: 0.9 MG/DL (ref 0.5–1.4)
CREAT SERPL-MCNC: 1 MG/DL (ref 0.5–1.4)
CREAT SERPL-MCNC: 1.1 MG/DL (ref 0.5–1.4)
CREAT SERPL-MCNC: 1.2 MG/DL (ref 0.5–1.4)
CREAT SERPL-MCNC: 1.3 MG/DL (ref 0.5–1.4)
CREAT SERPL-MCNC: 1.4 MG/DL (ref 0.5–1.4)
CREAT SERPL-MCNC: 1.5 MG/DL (ref 0.5–1.4)
CREAT SERPL-MCNC: 1.6 MG/DL (ref 0.5–1.4)
CREAT SERPL-MCNC: 1.7 MG/DL (ref 0.5–1.4)
CREAT SERPL-MCNC: 1.7 MG/DL (ref 0.5–1.4)
CREAT SERPL-MCNC: 1.8 MG/DL (ref 0.5–1.4)
CREAT SERPL-MCNC: 1.9 MG/DL (ref 0.5–1.4)
CREAT SERPL-MCNC: 2.1 MG/DL (ref 0.5–1.4)
CREAT SERPL-MCNC: 2.4 MG/DL (ref 0.5–1.4)
CREAT UR-MCNC: 49 MG/DL (ref 15–325)
CREAT UR-MCNC: 93 MG/DL (ref 15–325)
CRP SERPL-MCNC: 2.13 MG/L
CRP SERPL-MCNC: 8.5 MG/L
CV ECHO LV RWT: 0.22 CM
CV ECHO LV RWT: 0.45 CM
D DIMER PPP IA.FEU-MCNC: 4.83 MG/L FEU
DELSYS: ABNORMAL
DELSYS: NORMAL
DOP CALC AO PEAK VEL: 1.3 M/S
DOP CALC AO VTI: 24.8 CM
DOP CALC LVOT AREA: 2.8 CM2
DOP CALC LVOT DIAMETER: 1.9 CM
DOP CALC LVOT PEAK VEL: 0.9 M/S
DOP CALC LVOT STROKE VOLUME: 45.3 CM3
DOP CALCLVOT PEAK VEL VTI: 16 CM
E WAVE DECELERATION TIME: 330 MS
E/A RATIO: 0.65
E/E' RATIO: 19 M/S
ECHO EF ESTIMATED: 53 %
ECHO EF ESTIMATED: 60 %
ECHO LV POSTERIOR WALL: 0.5 CM (ref 0.6–1.1)
ECHO LV POSTERIOR WALL: 0.9 CM (ref 0.6–1.1)
ERYTHROCYTE [DISTWIDTH] IN BLOOD BY AUTOMATED COUNT: 14.4 % (ref 11.5–14.5)
ERYTHROCYTE [DISTWIDTH] IN BLOOD BY AUTOMATED COUNT: 14.4 % (ref 11.5–14.5)
ERYTHROCYTE [DISTWIDTH] IN BLOOD BY AUTOMATED COUNT: 14.6 % (ref 11.5–14.5)
ERYTHROCYTE [DISTWIDTH] IN BLOOD BY AUTOMATED COUNT: 14.7 % (ref 11.5–14.5)
ERYTHROCYTE [DISTWIDTH] IN BLOOD BY AUTOMATED COUNT: 14.7 % (ref 11.5–14.5)
ERYTHROCYTE [DISTWIDTH] IN BLOOD BY AUTOMATED COUNT: 14.9 % (ref 11.5–14.5)
ERYTHROCYTE [DISTWIDTH] IN BLOOD BY AUTOMATED COUNT: 15.3 % (ref 11.5–14.5)
ERYTHROCYTE [DISTWIDTH] IN BLOOD BY AUTOMATED COUNT: 15.3 % (ref 11.5–14.5)
ERYTHROCYTE [DISTWIDTH] IN BLOOD BY AUTOMATED COUNT: 15.5 % (ref 11.5–14.5)
ERYTHROCYTE [DISTWIDTH] IN BLOOD BY AUTOMATED COUNT: 15.5 % (ref 11.5–14.5)
ERYTHROCYTE [DISTWIDTH] IN BLOOD BY AUTOMATED COUNT: 15.6 % (ref 11.5–14.5)
ERYTHROCYTE [DISTWIDTH] IN BLOOD BY AUTOMATED COUNT: 15.6 % (ref 11.5–14.5)
ERYTHROCYTE [DISTWIDTH] IN BLOOD BY AUTOMATED COUNT: 15.7 % (ref 11.5–14.5)
ERYTHROCYTE [DISTWIDTH] IN BLOOD BY AUTOMATED COUNT: 15.9 % (ref 11.5–14.5)
ERYTHROCYTE [DISTWIDTH] IN BLOOD BY AUTOMATED COUNT: 16 % (ref 11.5–14.5)
ERYTHROCYTE [DISTWIDTH] IN BLOOD BY AUTOMATED COUNT: 16.1 % (ref 11.5–14.5)
ERYTHROCYTE [DISTWIDTH] IN BLOOD BY AUTOMATED COUNT: 16.2 % (ref 11.5–14.5)
ERYTHROCYTE [DISTWIDTH] IN BLOOD BY AUTOMATED COUNT: 16.2 % (ref 11.5–14.5)
ERYTHROCYTE [DISTWIDTH] IN BLOOD BY AUTOMATED COUNT: 16.3 % (ref 11.5–14.5)
ERYTHROCYTE [SEDIMENTATION RATE] IN BLOOD BY PHOTOMETRIC METHOD: 11 MM/HR
ERYTHROCYTE [SEDIMENTATION RATE] IN BLOOD BY PHOTOMETRIC METHOD: 24 MM/HR
ERYTHROCYTE [SEDIMENTATION RATE] IN BLOOD BY WESTERGREN METHOD: 18 MM/H
ERYTHROCYTE [SEDIMENTATION RATE] IN BLOOD BY WESTERGREN METHOD: 20 MM/H
ERYTHROCYTE [SEDIMENTATION RATE] IN BLOOD BY WESTERGREN METHOD: 25 MM/H
FIO2: 21
FIO2: 21 %
FIO2: 28
FIO2: 28
FIO2: 5
FLOW: 2
FRACTIONAL SHORTENING: 26.1 % (ref 28–44)
FRACTIONAL SHORTENING: 32.5 % (ref 28–44)
GFR SERPLBLD CREATININE-BSD FMLA CKD-EPI: 23 ML/MIN/1.73/M2
GFR SERPLBLD CREATININE-BSD FMLA CKD-EPI: 26 ML/MIN/1.73/M2
GFR SERPLBLD CREATININE-BSD FMLA CKD-EPI: 28 ML/MIN/1.73/M2
GFR SERPLBLD CREATININE-BSD FMLA CKD-EPI: 30 ML/MIN/1.73/M2
GFR SERPLBLD CREATININE-BSD FMLA CKD-EPI: 30 ML/MIN/1.73/M2
GFR SERPLBLD CREATININE-BSD FMLA CKD-EPI: 32 ML/MIN/1.73/M2
GFR SERPLBLD CREATININE-BSD FMLA CKD-EPI: 35 ML/MIN/1.73/M2
GFR SERPLBLD CREATININE-BSD FMLA CKD-EPI: 38 ML/MIN/1.73/M2
GFR SERPLBLD CREATININE-BSD FMLA CKD-EPI: 41 ML/MIN/1.73/M2
GFR SERPLBLD CREATININE-BSD FMLA CKD-EPI: 46 ML/MIN/1.73/M2
GFR SERPLBLD CREATININE-BSD FMLA CKD-EPI: 51 ML/MIN/1.73/M2
GFR SERPLBLD CREATININE-BSD FMLA CKD-EPI: 57 ML/MIN/1.73/M2
GFR SERPLBLD CREATININE-BSD FMLA CKD-EPI: >60 ML/MIN/1.73/M2
GFR SERPLBLD CREATININE-BSD FMLA CKD-EPI: >60 ML/MIN/1.73/M2
GLUCOSE SERPL-MCNC: 108 MG/DL (ref 70–110)
GLUCOSE SERPL-MCNC: 108 MG/DL (ref 70–110)
GLUCOSE SERPL-MCNC: 109 MG/DL (ref 70–110)
GLUCOSE SERPL-MCNC: 110 MG/DL (ref 70–110)
GLUCOSE SERPL-MCNC: 113 MG/DL (ref 70–110)
GLUCOSE SERPL-MCNC: 120 MG/DL (ref 70–110)
GLUCOSE SERPL-MCNC: 121 MG/DL (ref 70–110)
GLUCOSE SERPL-MCNC: 122 MG/DL (ref 70–110)
GLUCOSE SERPL-MCNC: 130 MG/DL (ref 70–110)
GLUCOSE SERPL-MCNC: 136 MG/DL (ref 70–110)
GLUCOSE SERPL-MCNC: 138 MG/DL (ref 70–110)
GLUCOSE SERPL-MCNC: 140 MG/DL (ref 70–110)
GLUCOSE SERPL-MCNC: 142 MG/DL (ref 70–110)
GLUCOSE SERPL-MCNC: 149 MG/DL (ref 70–110)
GLUCOSE SERPL-MCNC: 156 MG/DL (ref 70–110)
GLUCOSE SERPL-MCNC: 157 MG/DL (ref 70–110)
GLUCOSE SERPL-MCNC: 175 MG/DL (ref 70–110)
GLUCOSE SERPL-MCNC: 209 MG/DL (ref 70–110)
GLUCOSE SERPL-MCNC: 213 MG/DL (ref 70–110)
GLUCOSE SERPL-MCNC: 66 MG/DL (ref 70–110)
GLUCOSE SERPL-MCNC: 68 MG/DL (ref 70–110)
GLUCOSE SERPL-MCNC: 72 MG/DL (ref 70–110)
GLUCOSE SERPL-MCNC: 72 MG/DL (ref 70–110)
GLUCOSE SERPL-MCNC: 73 MG/DL (ref 70–110)
GLUCOSE SERPL-MCNC: 74 MG/DL (ref 70–110)
GLUCOSE SERPL-MCNC: 75 MG/DL (ref 70–110)
GLUCOSE SERPL-MCNC: 76 MG/DL (ref 70–110)
GLUCOSE SERPL-MCNC: 77 MG/DL (ref 70–110)
GLUCOSE SERPL-MCNC: 78 MG/DL (ref 70–110)
GLUCOSE SERPL-MCNC: 84 MG/DL (ref 70–110)
GLUCOSE SERPL-MCNC: 85 MG/DL (ref 70–110)
GLUCOSE SERPL-MCNC: 85 MG/DL (ref 70–110)
GLUCOSE SERPL-MCNC: 88 MG/DL (ref 70–110)
GLUCOSE SERPL-MCNC: 89 MG/DL (ref 70–110)
GLUCOSE SERPL-MCNC: 93 MG/DL (ref 70–110)
GLUCOSE SERPL-MCNC: 93 MG/DL (ref 70–110)
GLUCOSE SERPL-MCNC: 95 MG/DL (ref 70–110)
GLUCOSE SERPL-MCNC: 96 MG/DL (ref 70–110)
GLUCOSE SERPL-MCNC: 98 MG/DL (ref 70–110)
GLUCOSE SERPL-MCNC: 99 MG/DL (ref 70–110)
GLUCOSE UR QL STRIP: NEGATIVE
HCO3 UR-SCNC: 20.9 MMOL/L (ref 24–28)
HCO3 UR-SCNC: 21.8 MMOL/L (ref 24–28)
HCO3 UR-SCNC: 22.4 MMOL/L (ref 24–28)
HCO3 UR-SCNC: 22.5 MMOL/L (ref 24–28)
HCO3 UR-SCNC: 23.7 MMOL/L (ref 24–28)
HCO3 UR-SCNC: 23.7 MMOL/L (ref 24–28)
HCT VFR BLD AUTO: 35.1 % (ref 37–48.5)
HCT VFR BLD AUTO: 35.6 % (ref 37–48.5)
HCT VFR BLD AUTO: 35.8 % (ref 37–48.5)
HCT VFR BLD AUTO: 35.8 % (ref 37–48.5)
HCT VFR BLD AUTO: 36.3 % (ref 37–48.5)
HCT VFR BLD AUTO: 36.5 % (ref 37–48.5)
HCT VFR BLD AUTO: 36.6 % (ref 37–48.5)
HCT VFR BLD AUTO: 37.2 % (ref 37–48.5)
HCT VFR BLD AUTO: 37.3 % (ref 37–48.5)
HCT VFR BLD AUTO: 37.4 % (ref 37–48.5)
HCT VFR BLD AUTO: 37.5 % (ref 37–48.5)
HCT VFR BLD AUTO: 37.6 % (ref 37–48.5)
HCT VFR BLD AUTO: 37.7 % (ref 37–48.5)
HCT VFR BLD AUTO: 37.8 % (ref 37–48.5)
HCT VFR BLD AUTO: 38.2 % (ref 37–48.5)
HCT VFR BLD AUTO: 38.5 % (ref 37–48.5)
HCT VFR BLD AUTO: 38.6 % (ref 37–48.5)
HCT VFR BLD AUTO: 38.6 % (ref 37–48.5)
HCT VFR BLD AUTO: 38.7 % (ref 37–48.5)
HCT VFR BLD AUTO: 39.1 % (ref 37–48.5)
HCT VFR BLD AUTO: 39.6 % (ref 37–48.5)
HCT VFR BLD AUTO: 39.9 % (ref 37–48.5)
HCT VFR BLD AUTO: 40 % (ref 37–48.5)
HCT VFR BLD AUTO: 40.7 % (ref 37–48.5)
HCT VFR BLD CALC: 37 %PCV (ref 36–54)
HCT VFR BLD CALC: 39 %PCV (ref 36–54)
HCT VFR BLD CALC: 43.2 %
HGB BLD-MCNC: 11 GM/DL (ref 12–16)
HGB BLD-MCNC: 11.2 GM/DL (ref 12–16)
HGB BLD-MCNC: 11.3 GM/DL (ref 12–16)
HGB BLD-MCNC: 11.3 GM/DL (ref 12–16)
HGB BLD-MCNC: 11.4 GM/DL (ref 12–16)
HGB BLD-MCNC: 11.5 GM/DL (ref 12–16)
HGB BLD-MCNC: 11.5 GM/DL (ref 12–16)
HGB BLD-MCNC: 11.6 GM/DL (ref 12–16)
HGB BLD-MCNC: 11.7 GM/DL (ref 12–16)
HGB BLD-MCNC: 11.7 GM/DL (ref 12–16)
HGB BLD-MCNC: 11.8 GM/DL (ref 12–16)
HGB BLD-MCNC: 11.9 GM/DL (ref 12–16)
HGB BLD-MCNC: 11.9 GM/DL (ref 12–16)
HGB BLD-MCNC: 12 GM/DL (ref 12–16)
HGB BLD-MCNC: 12.1 GM/DL (ref 12–16)
HGB BLD-MCNC: 12.1 GM/DL (ref 12–16)
HGB BLD-MCNC: 12.2 GM/DL (ref 12–16)
HGB BLD-MCNC: 12.3 GM/DL (ref 12–16)
HGB BLD-MCNC: 12.3 GM/DL (ref 12–16)
HGB BLD-MCNC: 12.4 GM/DL (ref 12–16)
HGB BLD-MCNC: 12.5 GM/DL (ref 12–16)
HGB BLD-MCNC: 12.5 GM/DL (ref 12–16)
HGB BLD-MCNC: 12.9 GM/DL (ref 12–16)
HGB UR QL STRIP: NEGATIVE
HOLD SPECIMEN: NORMAL
HYALINE CASTS UR QL AUTO: 11 /LPF (ref 0–1)
IMM GRANULOCYTES # BLD AUTO: 0.02 K/UL (ref 0–0.04)
IMM GRANULOCYTES # BLD AUTO: 0.02 K/UL (ref 0–0.04)
IMM GRANULOCYTES # BLD AUTO: 0.03 K/UL (ref 0–0.04)
IMM GRANULOCYTES # BLD AUTO: 0.03 K/UL (ref 0–0.04)
IMM GRANULOCYTES # BLD AUTO: 0.04 K/UL (ref 0–0.04)
IMM GRANULOCYTES # BLD AUTO: 0.05 K/UL (ref 0–0.04)
IMM GRANULOCYTES # BLD AUTO: 0.06 K/UL (ref 0–0.04)
IMM GRANULOCYTES # BLD AUTO: 0.07 K/UL (ref 0–0.04)
IMM GRANULOCYTES # BLD AUTO: 0.08 K/UL (ref 0–0.04)
IMM GRANULOCYTES # BLD AUTO: 0.08 K/UL (ref 0–0.04)
IMM GRANULOCYTES # BLD AUTO: 0.09 K/UL (ref 0–0.04)
IMM GRANULOCYTES # BLD AUTO: 0.09 K/UL (ref 0–0.04)
IMM GRANULOCYTES # BLD AUTO: 0.1 K/UL (ref 0–0.04)
IMM GRANULOCYTES # BLD AUTO: 0.11 K/UL (ref 0–0.04)
IMM GRANULOCYTES # BLD AUTO: 0.2 K/UL (ref 0–0.04)
IMM GRANULOCYTES NFR BLD AUTO: 0.3 % (ref 0–0.5)
IMM GRANULOCYTES NFR BLD AUTO: 0.4 % (ref 0–0.5)
IMM GRANULOCYTES NFR BLD AUTO: 0.4 % (ref 0–0.5)
IMM GRANULOCYTES NFR BLD AUTO: 0.5 % (ref 0–0.5)
IMM GRANULOCYTES NFR BLD AUTO: 0.6 % (ref 0–0.5)
IMM GRANULOCYTES NFR BLD AUTO: 0.7 % (ref 0–0.5)
IMM GRANULOCYTES NFR BLD AUTO: 1 % (ref 0–0.5)
IMM GRANULOCYTES NFR BLD AUTO: 1 % (ref 0–0.5)
IMM GRANULOCYTES NFR BLD AUTO: 1.1 % (ref 0–0.5)
IMM GRANULOCYTES NFR BLD AUTO: 1.1 % (ref 0–0.5)
IMM GRANULOCYTES NFR BLD AUTO: 1.4 % (ref 0–0.5)
IMM GRANULOCYTES NFR BLD AUTO: 1.4 % (ref 0–0.5)
IMM GRANULOCYTES NFR BLD AUTO: 1.6 % (ref 0–0.5)
INR PPP: 1 (ref 0.8–1.2)
INTERVENTRICULAR SEPTUM: 0.8 CM (ref 0.6–1.1)
INTERVENTRICULAR SEPTUM: 0.9 CM (ref 0.6–1.1)
IVC DIAMETER: 2.28 CM
KETONES UR QL STRIP: NEGATIVE
LA MAJOR: 4.3 CM
LA MINOR: 4 CM
LA WIDTH: 2.4 CM
LACTATE SERPL-SCNC: 0.8 MMOL/L (ref 0.5–2.2)
LACTATE SERPL-SCNC: 1.1 MMOL/L (ref 0.5–2.2)
LACTATE SERPL-SCNC: 1.5 MMOL/L (ref 0.5–2.2)
LACTATE SERPL-SCNC: 1.6 MMOL/L (ref 0.5–2.2)
LACTATE SERPL-SCNC: 2 MMOL/L (ref 0.5–2.2)
LACTATE SERPL-SCNC: 2.3 MMOL/L (ref 0.5–2.2)
LACTATE SERPL-SCNC: 2.4 MMOL/L (ref 0.5–2.2)
LACTATE SERPL-SCNC: 2.6 MMOL/L (ref 0.5–2.2)
LACTATE SERPL-SCNC: 2.7 MMOL/L (ref 0.5–2.2)
LACTATE SERPL-SCNC: 2.8 MMOL/L (ref 0.5–2.2)
LACTATE SERPL-SCNC: 3 MMOL/L (ref 0.5–2.2)
LACTATE SERPL-SCNC: 3.3 MMOL/L (ref 0.5–2.2)
LDH SERPL L TO P-CCNC: 1.8 MMOL/L (ref 0.5–2.2)
LEFT ATRIUM SIZE: 3.9 CM
LEFT ATRIUM VOLUME INDEX MOD: 18 ML/M2
LEFT ATRIUM VOLUME INDEX: 18 ML/M2
LEFT ATRIUM VOLUME MOD: 32 ML
LEFT ATRIUM VOLUME: 33 CM3
LEFT INTERNAL DIMENSION IN SYSTOLE: 2.7 CM (ref 2.1–4)
LEFT INTERNAL DIMENSION IN SYSTOLE: 3.4 CM (ref 2.1–4)
LEFT VENTRICLE DIASTOLIC VOLUME INDEX: 38.12 ML/M2
LEFT VENTRICLE DIASTOLIC VOLUME INDEX: 54.75 ML/M2
LEFT VENTRICLE DIASTOLIC VOLUME: 69 ML
LEFT VENTRICLE DIASTOLIC VOLUME: 98 ML
LEFT VENTRICLE MASS INDEX: 55.5 G/M2
LEFT VENTRICLE MASS INDEX: 56 G/M2
LEFT VENTRICLE SYSTOLIC VOLUME INDEX: 15.5 ML/M2
LEFT VENTRICLE SYSTOLIC VOLUME INDEX: 25.7 ML/M2
LEFT VENTRICLE SYSTOLIC VOLUME: 28 ML
LEFT VENTRICLE SYSTOLIC VOLUME: 46 ML
LEFT VENTRICULAR INTERNAL DIMENSION IN DIASTOLE: 4 CM (ref 3.5–6)
LEFT VENTRICULAR INTERNAL DIMENSION IN DIASTOLE: 4.6 CM (ref 3.5–6)
LEFT VENTRICULAR MASS: 101.4 G
LEFT VENTRICULAR MASS: 99.3 G
LEUKOCYTE ESTERASE UR QL STRIP: ABNORMAL
LIPASE SERPL-CCNC: 80 U/L (ref 4–60)
LV LATERAL E/E' RATIO: 25.7
LV SEPTAL E/E' RATIO: 15.4
LYMPHOCYTES # BLD AUTO: 0.68 K/UL (ref 1–4.8)
LYMPHOCYTES # BLD AUTO: 0.87 K/UL (ref 1–4.8)
LYMPHOCYTES # BLD AUTO: 0.89 K/UL (ref 1–4.8)
LYMPHOCYTES # BLD AUTO: 0.94 K/UL (ref 1–4.8)
LYMPHOCYTES # BLD AUTO: 1.02 K/UL (ref 1–4.8)
LYMPHOCYTES # BLD AUTO: 1.06 K/UL (ref 1–4.8)
LYMPHOCYTES # BLD AUTO: 1.07 K/UL (ref 1–4.8)
LYMPHOCYTES # BLD AUTO: 1.08 K/UL (ref 1–4.8)
LYMPHOCYTES # BLD AUTO: 1.09 K/UL (ref 1–4.8)
LYMPHOCYTES # BLD AUTO: 1.1 K/UL (ref 1–4.8)
LYMPHOCYTES # BLD AUTO: 1.11 K/UL (ref 1–4.8)
LYMPHOCYTES # BLD AUTO: 1.12 K/UL (ref 1–4.8)
LYMPHOCYTES # BLD AUTO: 1.13 K/UL (ref 1–4.8)
LYMPHOCYTES # BLD AUTO: 1.13 K/UL (ref 1–4.8)
LYMPHOCYTES # BLD AUTO: 1.2 K/UL (ref 1–4.8)
LYMPHOCYTES # BLD AUTO: 1.27 K/UL (ref 1–4.8)
LYMPHOCYTES # BLD AUTO: 1.34 K/UL (ref 1–4.8)
LYMPHOCYTES # BLD AUTO: 1.38 K/UL (ref 1–4.8)
LYMPHOCYTES # BLD AUTO: 1.39 K/UL (ref 1–4.8)
LYMPHOCYTES # BLD AUTO: 1.48 K/UL (ref 1–4.8)
LYMPHOCYTES # BLD AUTO: 3.61 K/UL (ref 1–4.8)
MAGNESIUM SERPL-MCNC: 1.9 MG/DL (ref 1.6–2.6)
MAGNESIUM SERPL-MCNC: 1.9 MG/DL (ref 1.6–2.6)
MAGNESIUM SERPL-MCNC: 2 MG/DL (ref 1.6–2.6)
MAGNESIUM SERPL-MCNC: 2.1 MG/DL (ref 1.6–2.6)
MAGNESIUM SERPL-MCNC: 2.2 MG/DL (ref 1.6–2.6)
MCH RBC QN AUTO: 27.9 PG (ref 27–31)
MCH RBC QN AUTO: 27.9 PG (ref 27–31)
MCH RBC QN AUTO: 28 PG (ref 27–31)
MCH RBC QN AUTO: 28.1 PG (ref 27–31)
MCH RBC QN AUTO: 28.2 PG (ref 27–31)
MCH RBC QN AUTO: 28.3 PG (ref 27–31)
MCH RBC QN AUTO: 28.4 PG (ref 27–31)
MCH RBC QN AUTO: 28.4 PG (ref 27–31)
MCH RBC QN AUTO: 28.5 PG (ref 27–31)
MCH RBC QN AUTO: 28.6 PG (ref 27–31)
MCH RBC QN AUTO: 28.7 PG (ref 27–31)
MCH RBC QN AUTO: 28.9 PG (ref 27–31)
MCH RBC QN AUTO: 29.1 PG (ref 27–31)
MCHC RBC AUTO-ENTMCNC: 30.1 G/DL (ref 32–36)
MCHC RBC AUTO-ENTMCNC: 30.3 G/DL (ref 32–36)
MCHC RBC AUTO-ENTMCNC: 30.5 G/DL (ref 32–36)
MCHC RBC AUTO-ENTMCNC: 30.8 G/DL (ref 32–36)
MCHC RBC AUTO-ENTMCNC: 30.9 G/DL (ref 32–36)
MCHC RBC AUTO-ENTMCNC: 31 G/DL (ref 32–36)
MCHC RBC AUTO-ENTMCNC: 31.1 G/DL (ref 32–36)
MCHC RBC AUTO-ENTMCNC: 31.1 G/DL (ref 32–36)
MCHC RBC AUTO-ENTMCNC: 31.2 G/DL (ref 32–36)
MCHC RBC AUTO-ENTMCNC: 31.3 G/DL (ref 32–36)
MCHC RBC AUTO-ENTMCNC: 31.6 G/DL (ref 32–36)
MCHC RBC AUTO-ENTMCNC: 31.6 G/DL (ref 32–36)
MCHC RBC AUTO-ENTMCNC: 31.7 G/DL (ref 32–36)
MCHC RBC AUTO-ENTMCNC: 31.8 G/DL (ref 32–36)
MCHC RBC AUTO-ENTMCNC: 31.9 G/DL (ref 32–36)
MCHC RBC AUTO-ENTMCNC: 32 G/DL (ref 32–36)
MCHC RBC AUTO-ENTMCNC: 32.2 G/DL (ref 32–36)
MCHC RBC AUTO-ENTMCNC: 32.3 G/DL (ref 32–36)
MCHC RBC AUTO-ENTMCNC: 32.3 G/DL (ref 32–36)
MCHC RBC AUTO-ENTMCNC: 32.4 G/DL (ref 32–36)
MCHC RBC AUTO-ENTMCNC: 33.2 G/DL (ref 32–36)
MCV RBC AUTO: 85 FL (ref 82–98)
MCV RBC AUTO: 87 FL (ref 82–98)
MCV RBC AUTO: 88 FL (ref 82–98)
MCV RBC AUTO: 89 FL (ref 82–98)
MCV RBC AUTO: 89 FL (ref 82–98)
MCV RBC AUTO: 90 FL (ref 82–98)
MCV RBC AUTO: 91 FL (ref 82–98)
MCV RBC AUTO: 92 FL (ref 82–98)
MCV RBC AUTO: 93 FL (ref 82–98)
MCV RBC AUTO: 94 FL (ref 82–98)
MICROSCOPIC COMMENT: ABNORMAL
MIN VOL: 8.8
MODE: ABNORMAL
MODE: NORMAL
MV MEAN GRADIENT: 2 MMHG
MV PEAK A VEL: 1.19 M/S
MV PEAK E VEL: 0.77 M/S
MV PEAK GRADIENT: 5 MMHG
NITRITE UR QL STRIP: NEGATIVE
NITRITE UR QL STRIP: POSITIVE
NITRITE UR QL STRIP: POSITIVE
NUCLEATED RBC (/100WBC) (OHS): 0 /100 WBC
OHS CV RV/LV RATIO: 0.63 CM
OHS CV RV/LV RATIO: 1.11 CM
OHS QRS DURATION: 142 MS
OHS QRS DURATION: 146 MS
OHS QRS DURATION: 148 MS
OHS QRS DURATION: 152 MS
OHS QRS DURATION: 156 MS
OHS QRS DURATION: 158 MS
OHS QTC CALCULATION: 465 MS
OHS QTC CALCULATION: 470 MS
OHS QTC CALCULATION: 485 MS
OHS QTC CALCULATION: 501 MS
OHS QTC CALCULATION: 502 MS
OHS QTC CALCULATION: 508 MS
OHS QTC CALCULATION: 508 MS
OHS QTC CALCULATION: 516 MS
OSMOLALITY UR: 452 MOSM/KG (ref 50–1200)
PCO2 BLDA: 35.9 MMHG (ref 35–45)
PCO2 BLDA: 36.3 MMHG (ref 35–45)
PCO2 BLDA: 38.2 MMHG (ref 35–45)
PCO2 BLDA: 40 MMHG (ref 35–45)
PCO2 BLDA: 44.8 MMHG (ref 35–45)
PCO2 BLDA: 45.9 MMHG (ref 35–45)
PCO2 BLDA: 46.4 MMHG
PCO2 BLDA: 50.7 MMHG (ref 35–45)
PCO2 BLDA: 56.9 MMHG (ref 35–45)
PEEP: 5
PH SMN: 7.2 [PH] (ref 7.35–7.45)
PH SMN: 7.26 [PH] (ref 7.35–7.45)
PH SMN: 7.28 [PH] (ref 7.35–7.45)
PH SMN: 7.29 [PH] (ref 7.35–7.45)
PH SMN: 7.35 [PH]
PH SMN: 7.38 [PH] (ref 7.35–7.45)
PH SMN: 7.38 [PH] (ref 7.35–7.45)
PH SMN: 7.4 [PH] (ref 7.35–7.45)
PH SMN: 7.43 [PH] (ref 7.35–7.45)
PH UR STRIP: 6 [PH]
PH UR STRIP: 6 [PH]
PH UR STRIP: 7 [PH]
PHOSPHATE SERPL-MCNC: 2.7 MG/DL (ref 2.7–4.5)
PHOSPHATE SERPL-MCNC: 2.8 MG/DL (ref 2.7–4.5)
PHOSPHATE SERPL-MCNC: 2.9 MG/DL (ref 2.7–4.5)
PHOSPHATE SERPL-MCNC: 3 MG/DL (ref 2.7–4.5)
PHOSPHATE SERPL-MCNC: 3.1 MG/DL (ref 2.7–4.5)
PHOSPHATE SERPL-MCNC: 3.1 MG/DL (ref 2.7–4.5)
PHOSPHATE SERPL-MCNC: 3.8 MG/DL (ref 2.7–4.5)
PHOSPHATE SERPL-MCNC: 4.4 MG/DL (ref 2.7–4.5)
PHOSPHATE SERPL-MCNC: 4.5 MG/DL (ref 2.7–4.5)
PIP: 18
PISA TR MAX VEL: 2.3 M/S
PISA TR MAX VEL: 3 M/S
PLATELET # BLD AUTO: 137 K/UL (ref 150–450)
PLATELET # BLD AUTO: 149 K/UL (ref 150–450)
PLATELET # BLD AUTO: 162 K/UL (ref 150–450)
PLATELET # BLD AUTO: 162 K/UL (ref 150–450)
PLATELET # BLD AUTO: 163 K/UL (ref 150–450)
PLATELET # BLD AUTO: 173 K/UL (ref 150–450)
PLATELET # BLD AUTO: 173 K/UL (ref 150–450)
PLATELET # BLD AUTO: 174 K/UL (ref 150–450)
PLATELET # BLD AUTO: 174 K/UL (ref 150–450)
PLATELET # BLD AUTO: 175 K/UL (ref 150–450)
PLATELET # BLD AUTO: 175 K/UL (ref 150–450)
PLATELET # BLD AUTO: 177 K/UL (ref 150–450)
PLATELET # BLD AUTO: 178 K/UL (ref 150–450)
PLATELET # BLD AUTO: 179 K/UL (ref 150–450)
PLATELET # BLD AUTO: 183 K/UL (ref 150–450)
PLATELET # BLD AUTO: 183 K/UL (ref 150–450)
PLATELET # BLD AUTO: 184 K/UL (ref 150–450)
PLATELET # BLD AUTO: 188 K/UL (ref 150–450)
PLATELET # BLD AUTO: 190 K/UL (ref 150–450)
PLATELET # BLD AUTO: 194 K/UL (ref 150–450)
PLATELET # BLD AUTO: 197 K/UL (ref 150–450)
PLATELET # BLD AUTO: 199 K/UL (ref 150–450)
PLATELET # BLD AUTO: 224 K/UL (ref 150–450)
PLATELET # BLD AUTO: 228 K/UL (ref 150–450)
PLATELET BLD QL SMEAR: ABNORMAL
PMV BLD AUTO: 10 FL (ref 9.2–12.9)
PMV BLD AUTO: 10.1 FL (ref 9.2–12.9)
PMV BLD AUTO: 10.2 FL (ref 9.2–12.9)
PMV BLD AUTO: 10.3 FL (ref 9.2–12.9)
PMV BLD AUTO: 10.4 FL (ref 9.2–12.9)
PMV BLD AUTO: 10.4 FL (ref 9.2–12.9)
PMV BLD AUTO: 10.5 FL (ref 9.2–12.9)
PMV BLD AUTO: 10.6 FL (ref 9.2–12.9)
PMV BLD AUTO: 10.9 FL (ref 9.2–12.9)
PMV BLD AUTO: 9.4 FL (ref 9.2–12.9)
PMV BLD AUTO: 9.6 FL (ref 9.2–12.9)
PMV BLD AUTO: 9.7 FL (ref 9.2–12.9)
PMV BLD AUTO: 9.8 FL (ref 9.2–12.9)
PMV BLD AUTO: 9.9 FL (ref 9.2–12.9)
PO2 BLDA: 112 MMHG (ref 80–100)
PO2 BLDA: 29 MMHG (ref 40–60)
PO2 BLDA: 31 MMHG (ref 40–60)
PO2 BLDA: 32 MMHG (ref 40–60)
PO2 BLDA: 33 MMHG (ref 40–60)
PO2 BLDA: 35 MMHG (ref 40–60)
PO2 BLDA: 36.7 MMHG
PO2 BLDA: 39 MMHG (ref 40–60)
PO2 BLDA: 39 MMHG (ref 40–60)
PO2 BLDA: 48 MMHG (ref 40–60)
PO2 BLDA: 51 MMHG (ref 40–60)
PO2 BLDA: 68 MMHG (ref 80–100)
PO2 BLDA: 99 MMHG (ref 80–100)
POC BASE DEFICIT: -0.5 MMOL/L
POC BE: -1 MMOL/L (ref -2–2)
POC BE: -1 MMOL/L (ref -2–2)
POC BE: -2 MMOL/L (ref -2–2)
POC BE: -3 MMOL/L (ref -2–2)
POC BE: -5 MMOL/L (ref -2–2)
POC BE: -5 MMOL/L (ref -2–2)
POC BE: -6 MMOL/L (ref -2–2)
POC BE: -6 MMOL/L (ref -2–2)
POC HCO3: 23.3 MMOL/L
POC IONIZED CALCIUM: 1.48 MMOL/L (ref 1.06–1.42)
POC IONIZED CALCIUM: >2.5 MMOL/L (ref 1.06–1.42)
POC PERFORMED BY: NORMAL
POC SATURATED O2: 54 % (ref 95–100)
POC SATURATED O2: 59 % (ref 95–100)
POC SATURATED O2: 62 % (ref 95–100)
POC SATURATED O2: 62 % (ref 95–100)
POC SATURATED O2: 63 % (ref 95–100)
POC SATURATED O2: 66 % (ref 95–100)
POC SATURATED O2: 67 % (ref 95–100)
POC SATURATED O2: 78 % (ref 95–100)
POC SATURATED O2: 79 % (ref 95–100)
POC SATURATED O2: 94 % (ref 95–100)
POC SATURATED O2: 97 % (ref 95–100)
POC SATURATED O2: 97 % (ref 95–100)
POC TCO2 (MEASURED): 21 MMOL/L (ref 23–29)
POC TCO2: 22 MMOL/L (ref 24–29)
POC TCO2: 23 MMOL/L (ref 23–27)
POC TCO2: 24 MMOL/L (ref 23–27)
POC TCO2: 24 MMOL/L (ref 24–29)
POC TCO2: 25 MMOL/L (ref 23–27)
POC TCO2: 25 MMOL/L (ref 24–29)
POC TEMPERATURE: 37 C
POCT GLUCOSE: 101 MG/DL (ref 70–110)
POCT GLUCOSE: 102 MG/DL (ref 70–110)
POCT GLUCOSE: 104 MG/DL (ref 70–110)
POCT GLUCOSE: 106 MG/DL (ref 70–110)
POCT GLUCOSE: 107 MG/DL (ref 70–110)
POCT GLUCOSE: 108 MG/DL (ref 70–110)
POCT GLUCOSE: 113 MG/DL (ref 70–110)
POCT GLUCOSE: 114 MG/DL (ref 70–110)
POCT GLUCOSE: 115 MG/DL (ref 70–110)
POCT GLUCOSE: 118 MG/DL (ref 70–110)
POCT GLUCOSE: 120 MG/DL (ref 70–110)
POCT GLUCOSE: 121 MG/DL (ref 70–110)
POCT GLUCOSE: 121 MG/DL (ref 70–110)
POCT GLUCOSE: 122 MG/DL (ref 70–110)
POCT GLUCOSE: 123 MG/DL (ref 70–110)
POCT GLUCOSE: 126 MG/DL (ref 70–110)
POCT GLUCOSE: 129 MG/DL (ref 70–110)
POCT GLUCOSE: 140 MG/DL (ref 70–110)
POCT GLUCOSE: 145 MG/DL (ref 70–110)
POCT GLUCOSE: 149 MG/DL (ref 70–110)
POCT GLUCOSE: 153 MG/DL (ref 70–110)
POCT GLUCOSE: 160 MG/DL (ref 70–110)
POCT GLUCOSE: 161 MG/DL (ref 70–110)
POCT GLUCOSE: 165 MG/DL (ref 70–110)
POCT GLUCOSE: 183 MG/DL (ref 70–110)
POCT GLUCOSE: 212 MG/DL (ref 70–110)
POCT GLUCOSE: 55 MG/DL (ref 70–110)
POCT GLUCOSE: 62 MG/DL (ref 70–110)
POCT GLUCOSE: 63 MG/DL (ref 70–110)
POCT GLUCOSE: 70 MG/DL (ref 70–110)
POCT GLUCOSE: 71 MG/DL (ref 70–110)
POCT GLUCOSE: 75 MG/DL (ref 70–110)
POCT GLUCOSE: 75 MG/DL (ref 70–110)
POCT GLUCOSE: 76 MG/DL (ref 70–110)
POCT GLUCOSE: 76 MG/DL (ref 70–110)
POCT GLUCOSE: 78 MG/DL (ref 70–110)
POCT GLUCOSE: 78 MG/DL (ref 70–110)
POCT GLUCOSE: 83 MG/DL (ref 70–110)
POCT GLUCOSE: 85 MG/DL (ref 70–110)
POCT GLUCOSE: 86 MG/DL (ref 70–110)
POCT GLUCOSE: 89 MG/DL (ref 70–110)
POCT GLUCOSE: 89 MG/DL (ref 70–110)
POCT GLUCOSE: 92 MG/DL (ref 70–110)
POCT GLUCOSE: 93 MG/DL (ref 70–110)
POCT GLUCOSE: 95 MG/DL (ref 70–110)
POCT GLUCOSE: 96 MG/DL (ref 70–110)
POCT GLUCOSE: 97 MG/DL (ref 70–110)
POCT GLUCOSE: 98 MG/DL (ref 70–110)
POCT GLUCOSE: 99 MG/DL (ref 70–110)
POTASSIUM BLD-SCNC: 4.3 MMOL/L (ref 3.5–5.1)
POTASSIUM BLD-SCNC: 5.9 MMOL/L (ref 3.5–5.1)
POTASSIUM SERPL-SCNC: 3.1 MMOL/L (ref 3.5–5.1)
POTASSIUM SERPL-SCNC: 3.5 MMOL/L (ref 3.5–5.1)
POTASSIUM SERPL-SCNC: 3.6 MMOL/L (ref 3.5–5.1)
POTASSIUM SERPL-SCNC: 3.6 MMOL/L (ref 3.5–5.1)
POTASSIUM SERPL-SCNC: 3.7 MMOL/L (ref 3.5–5.1)
POTASSIUM SERPL-SCNC: 3.8 MMOL/L (ref 3.5–5.1)
POTASSIUM SERPL-SCNC: 3.9 MMOL/L (ref 3.5–5.1)
POTASSIUM SERPL-SCNC: 4 MMOL/L (ref 3.5–5.1)
POTASSIUM SERPL-SCNC: 4.1 MMOL/L (ref 3.5–5.1)
POTASSIUM SERPL-SCNC: 4.2 MMOL/L (ref 3.5–5.1)
POTASSIUM SERPL-SCNC: 4.2 MMOL/L (ref 3.5–5.1)
POTASSIUM SERPL-SCNC: 4.3 MMOL/L (ref 3.5–5.1)
POTASSIUM SERPL-SCNC: 4.3 MMOL/L (ref 3.5–5.1)
POTASSIUM SERPL-SCNC: 4.7 MMOL/L (ref 3.5–5.1)
POTASSIUM SERPL-SCNC: 4.8 MMOL/L (ref 3.5–5.1)
POTASSIUM SERPL-SCNC: 4.8 MMOL/L (ref 3.5–5.1)
POTASSIUM SERPL-SCNC: 5.2 MMOL/L (ref 3.5–5.1)
POTASSIUM SERPL-SCNC: 5.3 MMOL/L (ref 3.5–5.1)
POTASSIUM SERPL-SCNC: 5.8 MMOL/L (ref 3.5–5.1)
POTASSIUM SERPL-SCNC: 6 MMOL/L (ref 3.5–5.1)
PROCALCITONIN SERPL-MCNC: 0.06 NG/ML
PROCALCITONIN SERPL-MCNC: 0.35 NG/ML
PROT SERPL-MCNC: 5 GM/DL (ref 6–8.4)
PROT SERPL-MCNC: 5.1 GM/DL (ref 6–8.4)
PROT SERPL-MCNC: 5.2 GM/DL (ref 6–8.4)
PROT SERPL-MCNC: 5.3 GM/DL (ref 6–8.4)
PROT SERPL-MCNC: 5.4 GM/DL (ref 6–8.4)
PROT SERPL-MCNC: 5.4 GM/DL (ref 6–8.4)
PROT SERPL-MCNC: 5.5 GM/DL (ref 6–8.4)
PROT SERPL-MCNC: 5.5 GM/DL (ref 6–8.4)
PROT SERPL-MCNC: 5.6 GM/DL (ref 6–8.4)
PROT SERPL-MCNC: 5.7 GM/DL (ref 6–8.4)
PROT SERPL-MCNC: 5.7 GM/DL (ref 6–8.4)
PROT SERPL-MCNC: 5.8 GM/DL (ref 6–8.4)
PROT SERPL-MCNC: 6 GM/DL (ref 6–8.4)
PROT SERPL-MCNC: 6 GM/DL (ref 6–8.4)
PROT UR QL STRIP: NEGATIVE
PROT UR-MCNC: 15 MG/DL
PROT/CREAT UR: 0.16 MG/G{CREAT}
PROTHROMBIN TIME: 11.3 SECONDS (ref 9–12.5)
PTH-INTACT SERPL-MCNC: 142.5 PG/ML (ref 9–77)
RA MAJOR: 4.15 CM
RA PRESSURE ESTIMATED: 15 MMHG
RA WIDTH: 3.25 CM
RBC # BLD AUTO: 3.87 M/UL (ref 4–5.4)
RBC # BLD AUTO: 3.93 M/UL (ref 4–5.4)
RBC # BLD AUTO: 3.99 M/UL (ref 4–5.4)
RBC # BLD AUTO: 4.02 M/UL (ref 4–5.4)
RBC # BLD AUTO: 4.02 M/UL (ref 4–5.4)
RBC # BLD AUTO: 4.03 M/UL (ref 4–5.4)
RBC # BLD AUTO: 4.04 M/UL (ref 4–5.4)
RBC # BLD AUTO: 4.05 M/UL (ref 4–5.4)
RBC # BLD AUTO: 4.1 M/UL (ref 4–5.4)
RBC # BLD AUTO: 4.11 M/UL (ref 4–5.4)
RBC # BLD AUTO: 4.12 M/UL (ref 4–5.4)
RBC # BLD AUTO: 4.15 M/UL (ref 4–5.4)
RBC # BLD AUTO: 4.2 M/UL (ref 4–5.4)
RBC # BLD AUTO: 4.2 M/UL (ref 4–5.4)
RBC # BLD AUTO: 4.21 M/UL (ref 4–5.4)
RBC # BLD AUTO: 4.29 M/UL (ref 4–5.4)
RBC # BLD AUTO: 4.29 M/UL (ref 4–5.4)
RBC # BLD AUTO: 4.3 M/UL (ref 4–5.4)
RBC # BLD AUTO: 4.33 M/UL (ref 4–5.4)
RBC # BLD AUTO: 4.34 M/UL (ref 4–5.4)
RBC # BLD AUTO: 4.37 M/UL (ref 4–5.4)
RBC # BLD AUTO: 4.39 M/UL (ref 4–5.4)
RBC # BLD AUTO: 4.4 M/UL (ref 4–5.4)
RBC # BLD AUTO: 4.42 M/UL (ref 4–5.4)
RBC # BLD AUTO: 4.43 M/UL (ref 4–5.4)
RBC # BLD AUTO: 4.54 M/UL (ref 4–5.4)
RBC #/AREA URNS AUTO: 1 /HPF (ref 0–4)
RBC #/AREA URNS AUTO: 3 /HPF (ref 0–4)
RBC #/AREA URNS AUTO: 33 /HPF (ref 0–4)
RELATIVE EOSINOPHIL (OHS): 0.1 %
RELATIVE EOSINOPHIL (OHS): 1 %
RELATIVE EOSINOPHIL (OHS): 1 %
RELATIVE EOSINOPHIL (OHS): 1.8 %
RELATIVE EOSINOPHIL (OHS): 2.1 %
RELATIVE EOSINOPHIL (OHS): 2.3 %
RELATIVE EOSINOPHIL (OHS): 3.1 %
RELATIVE EOSINOPHIL (OHS): 3.2 %
RELATIVE EOSINOPHIL (OHS): 3.2 %
RELATIVE EOSINOPHIL (OHS): 3.4 %
RELATIVE EOSINOPHIL (OHS): 3.5 %
RELATIVE EOSINOPHIL (OHS): 3.5 %
RELATIVE EOSINOPHIL (OHS): 3.6 %
RELATIVE EOSINOPHIL (OHS): 4.1 %
RELATIVE EOSINOPHIL (OHS): 4.6 %
RELATIVE EOSINOPHIL (OHS): 4.6 %
RELATIVE EOSINOPHIL (OHS): 4.8 %
RELATIVE EOSINOPHIL (OHS): 4.8 %
RELATIVE EOSINOPHIL (OHS): 4.9 %
RELATIVE EOSINOPHIL (OHS): 5.1 %
RELATIVE EOSINOPHIL (OHS): 6.7 %
RELATIVE EOSINOPHIL (OHS): 7.5 %
RELATIVE EOSINOPHIL (OHS): 7.9 %
RELATIVE LYMPHOCYTE (OHS): 10.3 % (ref 18–48)
RELATIVE LYMPHOCYTE (OHS): 11.1 % (ref 18–48)
RELATIVE LYMPHOCYTE (OHS): 11.4 % (ref 18–48)
RELATIVE LYMPHOCYTE (OHS): 11.8 % (ref 18–48)
RELATIVE LYMPHOCYTE (OHS): 13.5 % (ref 18–48)
RELATIVE LYMPHOCYTE (OHS): 14.3 % (ref 18–48)
RELATIVE LYMPHOCYTE (OHS): 14.4 % (ref 18–48)
RELATIVE LYMPHOCYTE (OHS): 14.5 % (ref 18–48)
RELATIVE LYMPHOCYTE (OHS): 14.6 % (ref 18–48)
RELATIVE LYMPHOCYTE (OHS): 14.8 % (ref 18–48)
RELATIVE LYMPHOCYTE (OHS): 14.8 % (ref 18–48)
RELATIVE LYMPHOCYTE (OHS): 15.2 % (ref 18–48)
RELATIVE LYMPHOCYTE (OHS): 15.3 % (ref 18–48)
RELATIVE LYMPHOCYTE (OHS): 15.5 % (ref 18–48)
RELATIVE LYMPHOCYTE (OHS): 15.6 % (ref 18–48)
RELATIVE LYMPHOCYTE (OHS): 17.6 % (ref 18–48)
RELATIVE LYMPHOCYTE (OHS): 19.2 % (ref 18–48)
RELATIVE LYMPHOCYTE (OHS): 21.8 % (ref 18–48)
RELATIVE LYMPHOCYTE (OHS): 34.3 % (ref 18–48)
RELATIVE LYMPHOCYTE (OHS): 4.9 % (ref 18–48)
RELATIVE LYMPHOCYTE (OHS): 7.3 % (ref 18–48)
RELATIVE LYMPHOCYTE (OHS): 8.3 % (ref 18–48)
RELATIVE LYMPHOCYTE (OHS): 9.3 % (ref 18–48)
RELATIVE MONOCYTE (OHS): 10.4 % (ref 4–15)
RELATIVE MONOCYTE (OHS): 10.6 % (ref 4–15)
RELATIVE MONOCYTE (OHS): 11 % (ref 4–15)
RELATIVE MONOCYTE (OHS): 11.3 % (ref 4–15)
RELATIVE MONOCYTE (OHS): 11.6 % (ref 4–15)
RELATIVE MONOCYTE (OHS): 11.8 % (ref 4–15)
RELATIVE MONOCYTE (OHS): 11.9 % (ref 4–15)
RELATIVE MONOCYTE (OHS): 12.2 % (ref 4–15)
RELATIVE MONOCYTE (OHS): 12.7 % (ref 4–15)
RELATIVE MONOCYTE (OHS): 13 % (ref 4–15)
RELATIVE MONOCYTE (OHS): 13 % (ref 4–15)
RELATIVE MONOCYTE (OHS): 13.1 % (ref 4–15)
RELATIVE MONOCYTE (OHS): 13.7 % (ref 4–15)
RELATIVE MONOCYTE (OHS): 14.4 % (ref 4–15)
RELATIVE MONOCYTE (OHS): 14.5 % (ref 4–15)
RELATIVE MONOCYTE (OHS): 14.6 % (ref 4–15)
RELATIVE MONOCYTE (OHS): 14.9 % (ref 4–15)
RELATIVE MONOCYTE (OHS): 8.4 % (ref 4–15)
RELATIVE MONOCYTE (OHS): 8.8 % (ref 4–15)
RELATIVE MONOCYTE (OHS): 9.2 % (ref 4–15)
RELATIVE MONOCYTE (OHS): 9.6 % (ref 4–15)
RELATIVE MONOCYTE (OHS): 9.7 % (ref 4–15)
RELATIVE MONOCYTE (OHS): 9.8 % (ref 4–15)
RELATIVE NEUTROPHIL (OHS): 50.8 % (ref 38–73)
RELATIVE NEUTROPHIL (OHS): 58.9 % (ref 38–73)
RELATIVE NEUTROPHIL (OHS): 59.5 % (ref 38–73)
RELATIVE NEUTROPHIL (OHS): 60.1 % (ref 38–73)
RELATIVE NEUTROPHIL (OHS): 61.3 % (ref 38–73)
RELATIVE NEUTROPHIL (OHS): 63.9 % (ref 38–73)
RELATIVE NEUTROPHIL (OHS): 64.5 % (ref 38–73)
RELATIVE NEUTROPHIL (OHS): 65.1 % (ref 38–73)
RELATIVE NEUTROPHIL (OHS): 65.8 % (ref 38–73)
RELATIVE NEUTROPHIL (OHS): 67.8 % (ref 38–73)
RELATIVE NEUTROPHIL (OHS): 67.8 % (ref 38–73)
RELATIVE NEUTROPHIL (OHS): 67.9 % (ref 38–73)
RELATIVE NEUTROPHIL (OHS): 69.2 % (ref 38–73)
RELATIVE NEUTROPHIL (OHS): 69.2 % (ref 38–73)
RELATIVE NEUTROPHIL (OHS): 70.3 % (ref 38–73)
RELATIVE NEUTROPHIL (OHS): 71.2 % (ref 38–73)
RELATIVE NEUTROPHIL (OHS): 71.8 % (ref 38–73)
RELATIVE NEUTROPHIL (OHS): 72.2 % (ref 38–73)
RELATIVE NEUTROPHIL (OHS): 73.9 % (ref 38–73)
RELATIVE NEUTROPHIL (OHS): 76.9 % (ref 38–73)
RELATIVE NEUTROPHIL (OHS): 79.9 % (ref 38–73)
RELATIVE NEUTROPHIL (OHS): 81.4 % (ref 38–73)
RELATIVE NEUTROPHIL (OHS): 84.4 % (ref 38–73)
RIGHT VENTRICLE DIASTOLIC BASEL DIMENSION: 2.5 CM
RIGHT VENTRICLE DIASTOLIC BASEL DIMENSION: 5.1 CM
RV TB RVSP: 18 MMHG
RV TISSUE DOPPLER FREE WALL SYSTOLIC VELOCITY 1 (APICAL 4 CHAMBER VIEW): 9.62 CM/S
SAMPLE: ABNORMAL
SAMPLE: NORMAL
SINUS: 2.95 CM
SITE: ABNORMAL
SITE: NORMAL
SODIUM BLD-SCNC: 135 MMOL/L (ref 136–145)
SODIUM BLD-SCNC: 139 MMOL/L (ref 136–145)
SODIUM SERPL-SCNC: 136 MMOL/L (ref 136–145)
SODIUM SERPL-SCNC: 137 MMOL/L (ref 136–145)
SODIUM SERPL-SCNC: 137 MMOL/L (ref 136–145)
SODIUM SERPL-SCNC: 138 MMOL/L (ref 136–145)
SODIUM SERPL-SCNC: 139 MMOL/L (ref 136–145)
SODIUM SERPL-SCNC: 140 MMOL/L (ref 136–145)
SODIUM SERPL-SCNC: 141 MMOL/L (ref 136–145)
SODIUM SERPL-SCNC: 142 MMOL/L (ref 136–145)
SODIUM SERPL-SCNC: 143 MMOL/L (ref 136–145)
SODIUM SERPL-SCNC: 144 MMOL/L (ref 136–145)
SODIUM SERPL-SCNC: 145 MMOL/L (ref 136–145)
SODIUM SERPL-SCNC: 146 MMOL/L (ref 136–145)
SODIUM SERPL-SCNC: 147 MMOL/L (ref 136–145)
SODIUM SERPL-SCNC: 149 MMOL/L (ref 136–145)
SODIUM SERPL-SCNC: 150 MMOL/L (ref 136–145)
SODIUM SERPL-SCNC: 150 MMOL/L (ref 136–145)
SODIUM SERPL-SCNC: 151 MMOL/L (ref 136–145)
SODIUM SERPL-SCNC: 152 MMOL/L (ref 136–145)
SODIUM SERPL-SCNC: 152 MMOL/L (ref 136–145)
SODIUM UR-SCNC: 78 MMOL/L (ref 20–250)
SP GR UR STRIP: 1.01
SP02: 94
SP02: 97
SP02: 97
SPECIMEN SOURCE: NORMAL
SQUAMOUS #/AREA URNS AUTO: 1 /HPF
SQUAMOUS #/AREA URNS AUTO: 30 /HPF
STJ: 2.3 CM
T4 FREE SERPL-MCNC: 0.78 NG/DL (ref 0.71–1.51)
T4 FREE SERPL-MCNC: 1.45 NG/DL (ref 0.71–1.51)
TDI LATERAL: 0.03 M/S
TDI LATERAL: 0.1 M/S
TDI SEPTAL: 0.03 M/S
TDI SEPTAL: 0.05 M/S
TDI: 0.04 M/S
TDI: 0.07 M/S
TRICUSPID ANNULAR PLANE SYSTOLIC EXCURSION: 1.53 CM
TRICUSPID ANNULAR PLANE SYSTOLIC EXCURSION: 2.08 CM
TROPONIN I SERPL HS-MCNC: 112 NG/L
TROPONIN I SERPL HS-MCNC: 133 NG/L
TROPONIN I SERPL HS-MCNC: 14 NG/L
TROPONIN I SERPL HS-MCNC: 28 NG/L
TROPONIN I SERPL HS-MCNC: 38 NG/L
TROPONIN I SERPL HS-MCNC: 61 NG/L
TSH SERPL-ACNC: 1.72 UIU/ML (ref 0.4–4)
TSH SERPL-ACNC: 10.72 UIU/ML (ref 0.4–4)
TV PEAK GRADIENT: 22 MMHG
TV PEAK GRADIENT: 35 MMHG
TV REST PULMONARY ARTERY PRESSURE: 51 MMHG
URATE SERPL-MCNC: 9.9 MG/DL (ref 2.4–5.7)
UROBILINOGEN UR STRIP-ACNC: NEGATIVE EU/DL
VANCOMYCIN SERPL-MCNC: 14.7 UG/ML (ref ?–80)
VANCOMYCIN SERPL-MCNC: 15.3 UG/ML (ref ?–80)
VANCOMYCIN SERPL-MCNC: 16.8 UG/ML (ref ?–80)
VANCOMYCIN SERPL-MCNC: 20.1 UG/ML (ref ?–80)
VANCOMYCIN SERPL-MCNC: 23.8 UG/ML (ref ?–80)
VT: 430
WBC # BLD AUTO: 10.54 K/UL (ref 3.9–12.7)
WBC # BLD AUTO: 10.94 K/UL (ref 3.9–12.7)
WBC # BLD AUTO: 11.34 K/UL (ref 3.9–12.7)
WBC # BLD AUTO: 13.07 K/UL (ref 3.9–12.7)
WBC # BLD AUTO: 13.99 K/UL (ref 3.9–12.7)
WBC # BLD AUTO: 14.6 K/UL (ref 3.9–12.7)
WBC # BLD AUTO: 6.31 K/UL (ref 3.9–12.7)
WBC # BLD AUTO: 6.38 K/UL (ref 3.9–12.7)
WBC # BLD AUTO: 6.96 K/UL (ref 3.9–12.7)
WBC # BLD AUTO: 7.05 K/UL (ref 3.9–12.7)
WBC # BLD AUTO: 7.11 K/UL (ref 3.9–12.7)
WBC # BLD AUTO: 7.14 K/UL (ref 3.9–12.7)
WBC # BLD AUTO: 7.19 K/UL (ref 3.9–12.7)
WBC # BLD AUTO: 7.24 K/UL (ref 3.9–12.7)
WBC # BLD AUTO: 7.32 K/UL (ref 3.9–12.7)
WBC # BLD AUTO: 7.76 K/UL (ref 3.9–12.7)
WBC # BLD AUTO: 7.81 K/UL (ref 3.9–12.7)
WBC # BLD AUTO: 7.83 K/UL (ref 3.9–12.7)
WBC # BLD AUTO: 8.1 K/UL (ref 3.9–12.7)
WBC # BLD AUTO: 8.37 K/UL (ref 3.9–12.7)
WBC # BLD AUTO: 8.46 K/UL (ref 3.9–12.7)
WBC # BLD AUTO: 8.63 K/UL (ref 3.9–12.7)
WBC # BLD AUTO: 8.84 K/UL (ref 3.9–12.7)
WBC # BLD AUTO: 9.33 K/UL (ref 3.9–12.7)
WBC # BLD AUTO: 9.6 K/UL (ref 3.9–12.7)
WBC # BLD AUTO: 9.65 K/UL (ref 3.9–12.7)
WBC #/AREA URNS AUTO: 29 /HPF (ref 0–5)
WBC #/AREA URNS AUTO: 33 /HPF (ref 0–5)
WBC #/AREA URNS AUTO: 7 /HPF (ref 0–5)
YEAST UR QL AUTO: ABNORMAL /HPF
Z-SCORE OF LEFT VENTRICULAR DIMENSION IN END DIASTOLE: -0.73
Z-SCORE OF LEFT VENTRICULAR DIMENSION IN END DIASTOLE: -2.25
Z-SCORE OF LEFT VENTRICULAR DIMENSION IN END SYSTOLE: -1.08
Z-SCORE OF LEFT VENTRICULAR DIMENSION IN END SYSTOLE: 0.84

## 2025-01-01 PROCEDURE — 63600175 PHARM REV CODE 636 W HCPCS: Performed by: STUDENT IN AN ORGANIZED HEALTH CARE EDUCATION/TRAINING PROGRAM

## 2025-01-01 PROCEDURE — G0545 PR VISIT INHERENT TO INPT OR OBS CARE, INFECTIOUS DISEASE: HCPCS | Mod: ,,, | Performed by: INTERNAL MEDICINE

## 2025-01-01 PROCEDURE — 25000003 PHARM REV CODE 250: Performed by: STUDENT IN AN ORGANIZED HEALTH CARE EDUCATION/TRAINING PROGRAM

## 2025-01-01 PROCEDURE — 84443 ASSAY THYROID STIM HORMONE: CPT | Performed by: EMERGENCY MEDICINE

## 2025-01-01 PROCEDURE — 84484 ASSAY OF TROPONIN QUANT: CPT

## 2025-01-01 PROCEDURE — 85025 COMPLETE CBC W/AUTO DIFF WBC: CPT | Performed by: STUDENT IN AN ORGANIZED HEALTH CARE EDUCATION/TRAINING PROGRAM

## 2025-01-01 PROCEDURE — 80053 COMPREHEN METABOLIC PANEL: CPT | Performed by: HOSPITALIST

## 2025-01-01 PROCEDURE — 93005 ELECTROCARDIOGRAM TRACING: CPT

## 2025-01-01 PROCEDURE — 93010 ELECTROCARDIOGRAM REPORT: CPT | Mod: ,,, | Performed by: INTERNAL MEDICINE

## 2025-01-01 PROCEDURE — 27000207 HC ISOLATION

## 2025-01-01 PROCEDURE — 92526 ORAL FUNCTION THERAPY: CPT

## 2025-01-01 PROCEDURE — 63600175 PHARM REV CODE 636 W HCPCS

## 2025-01-01 PROCEDURE — 83970 ASSAY OF PARATHORMONE: CPT | Performed by: STUDENT IN AN ORGANIZED HEALTH CARE EDUCATION/TRAINING PROGRAM

## 2025-01-01 PROCEDURE — 97530 THERAPEUTIC ACTIVITIES: CPT | Mod: CQ

## 2025-01-01 PROCEDURE — 99498 ADVNCD CARE PLAN ADDL 30 MIN: CPT | Mod: ,,,

## 2025-01-01 PROCEDURE — 97530 THERAPEUTIC ACTIVITIES: CPT

## 2025-01-01 PROCEDURE — 36415 COLL VENOUS BLD VENIPUNCTURE: CPT | Performed by: STUDENT IN AN ORGANIZED HEALTH CARE EDUCATION/TRAINING PROGRAM

## 2025-01-01 PROCEDURE — 27100171 HC OXYGEN HIGH FLOW UP TO 24 HOURS

## 2025-01-01 PROCEDURE — 99900035 HC TECH TIME PER 15 MIN (STAT)

## 2025-01-01 PROCEDURE — 25000003 PHARM REV CODE 250

## 2025-01-01 PROCEDURE — 25000003 PHARM REV CODE 250: Performed by: INTERNAL MEDICINE

## 2025-01-01 PROCEDURE — 85025 COMPLETE CBC W/AUTO DIFF WBC: CPT | Performed by: HOSPITALIST

## 2025-01-01 PROCEDURE — 83735 ASSAY OF MAGNESIUM: CPT | Performed by: STUDENT IN AN ORGANIZED HEALTH CARE EDUCATION/TRAINING PROGRAM

## 2025-01-01 PROCEDURE — 31500 INSERT EMERGENCY AIRWAY: CPT

## 2025-01-01 PROCEDURE — 99291 CRITICAL CARE FIRST HOUR: CPT | Mod: GC,,, | Performed by: INTERNAL MEDICINE

## 2025-01-01 PROCEDURE — 97535 SELF CARE MNGMENT TRAINING: CPT

## 2025-01-01 PROCEDURE — 94799 UNLISTED PULMONARY SVC/PX: CPT

## 2025-01-01 PROCEDURE — 20600001 HC STEP DOWN PRIVATE ROOM

## 2025-01-01 PROCEDURE — 27000221 HC OXYGEN, UP TO 24 HOURS

## 2025-01-01 PROCEDURE — 87040 BLOOD CULTURE FOR BACTERIA: CPT | Performed by: STUDENT IN AN ORGANIZED HEALTH CARE EDUCATION/TRAINING PROGRAM

## 2025-01-01 PROCEDURE — 11000001 HC ACUTE MED/SURG PRIVATE ROOM

## 2025-01-01 PROCEDURE — 25000003 PHARM REV CODE 250: Performed by: PHYSICIAN ASSISTANT

## 2025-01-01 PROCEDURE — 25000242 PHARM REV CODE 250 ALT 637 W/ HCPCS: Performed by: STUDENT IN AN ORGANIZED HEALTH CARE EDUCATION/TRAINING PROGRAM

## 2025-01-01 PROCEDURE — 99223 1ST HOSP IP/OBS HIGH 75: CPT | Mod: ,,, | Performed by: INTERNAL MEDICINE

## 2025-01-01 PROCEDURE — 63600175 PHARM REV CODE 636 W HCPCS: Performed by: EMERGENCY MEDICINE

## 2025-01-01 PROCEDURE — 02HV33Z INSERTION OF INFUSION DEVICE INTO SUPERIOR VENA CAVA, PERCUTANEOUS APPROACH: ICD-10-PCS | Performed by: EMERGENCY MEDICINE

## 2025-01-01 PROCEDURE — 80053 COMPREHEN METABOLIC PANEL: CPT | Performed by: STUDENT IN AN ORGANIZED HEALTH CARE EDUCATION/TRAINING PROGRAM

## 2025-01-01 PROCEDURE — 25000003 PHARM REV CODE 250: Performed by: HOSPITALIST

## 2025-01-01 PROCEDURE — 80202 ASSAY OF VANCOMYCIN: CPT | Performed by: HOSPITALIST

## 2025-01-01 PROCEDURE — 99497 ADVNCD CARE PLAN 30 MIN: CPT | Mod: ,,,

## 2025-01-01 PROCEDURE — 02PA3MZ REMOVAL OF CARDIAC LEAD FROM HEART, PERCUTANEOUS APPROACH: ICD-10-PCS | Performed by: INTERNAL MEDICINE

## 2025-01-01 PROCEDURE — 63600175 PHARM REV CODE 636 W HCPCS: Performed by: INTERNAL MEDICINE

## 2025-01-01 PROCEDURE — 82040 ASSAY OF SERUM ALBUMIN: CPT | Performed by: STUDENT IN AN ORGANIZED HEALTH CARE EDUCATION/TRAINING PROGRAM

## 2025-01-01 PROCEDURE — 36415 COLL VENOUS BLD VENIPUNCTURE: CPT | Performed by: HOSPITALIST

## 2025-01-01 PROCEDURE — 99233 SBSQ HOSP IP/OBS HIGH 50: CPT | Mod: ,,, | Performed by: INTERNAL MEDICINE

## 2025-01-01 PROCEDURE — 25000242 PHARM REV CODE 250 ALT 637 W/ HCPCS: Performed by: EMERGENCY MEDICINE

## 2025-01-01 PROCEDURE — 96375 TX/PRO/DX INJ NEW DRUG ADDON: CPT

## 2025-01-01 PROCEDURE — 99233 SBSQ HOSP IP/OBS HIGH 50: CPT | Mod: ,,, | Performed by: STUDENT IN AN ORGANIZED HEALTH CARE EDUCATION/TRAINING PROGRAM

## 2025-01-01 PROCEDURE — 20000000 HC ICU ROOM

## 2025-01-01 PROCEDURE — 85027 COMPLETE CBC AUTOMATED: CPT | Performed by: STUDENT IN AN ORGANIZED HEALTH CARE EDUCATION/TRAINING PROGRAM

## 2025-01-01 PROCEDURE — 83935 ASSAY OF URINE OSMOLALITY: CPT | Performed by: STUDENT IN AN ORGANIZED HEALTH CARE EDUCATION/TRAINING PROGRAM

## 2025-01-01 PROCEDURE — 84484 ASSAY OF TROPONIN QUANT: CPT | Performed by: HOSPITALIST

## 2025-01-01 PROCEDURE — 99024 POSTOP FOLLOW-UP VISIT: CPT | Mod: ,,, | Performed by: STUDENT IN AN ORGANIZED HEALTH CARE EDUCATION/TRAINING PROGRAM

## 2025-01-01 PROCEDURE — 82310 ASSAY OF CALCIUM: CPT | Performed by: STUDENT IN AN ORGANIZED HEALTH CARE EDUCATION/TRAINING PROGRAM

## 2025-01-01 PROCEDURE — 99233 SBSQ HOSP IP/OBS HIGH 50: CPT | Mod: ,,,

## 2025-01-01 PROCEDURE — 97161 PT EVAL LOW COMPLEX 20 MIN: CPT

## 2025-01-01 PROCEDURE — 96374 THER/PROPH/DIAG INJ IV PUSH: CPT

## 2025-01-01 PROCEDURE — 82803 BLOOD GASES ANY COMBINATION: CPT

## 2025-01-01 PROCEDURE — 99231 SBSQ HOSP IP/OBS SF/LOW 25: CPT | Mod: GC,,, | Performed by: INTERNAL MEDICINE

## 2025-01-01 PROCEDURE — 94760 N-INVAS EAR/PLS OXIMETRY 1: CPT

## 2025-01-01 PROCEDURE — 82310 ASSAY OF CALCIUM: CPT | Performed by: INTERNAL MEDICINE

## 2025-01-01 PROCEDURE — 99497 ADVNCD CARE PLAN 30 MIN: CPT | Mod: 25,,,

## 2025-01-01 PROCEDURE — 85652 RBC SED RATE AUTOMATED: CPT | Performed by: HOSPITALIST

## 2025-01-01 PROCEDURE — 85379 FIBRIN DEGRADATION QUANT: CPT | Performed by: HOSPITALIST

## 2025-01-01 PROCEDURE — 37000009 HC ANESTHESIA EA ADD 15 MINS: Performed by: STUDENT IN AN ORGANIZED HEALTH CARE EDUCATION/TRAINING PROGRAM

## 2025-01-01 PROCEDURE — 94003 VENT MGMT INPAT SUBQ DAY: CPT

## 2025-01-01 PROCEDURE — 82330 ASSAY OF CALCIUM: CPT

## 2025-01-01 PROCEDURE — 99223 1ST HOSP IP/OBS HIGH 75: CPT | Mod: GC,,, | Performed by: STUDENT IN AN ORGANIZED HEALTH CARE EDUCATION/TRAINING PROGRAM

## 2025-01-01 PROCEDURE — 82330 ASSAY OF CALCIUM: CPT | Performed by: STUDENT IN AN ORGANIZED HEALTH CARE EDUCATION/TRAINING PROGRAM

## 2025-01-01 PROCEDURE — 36600 WITHDRAWAL OF ARTERIAL BLOOD: CPT

## 2025-01-01 PROCEDURE — 84300 ASSAY OF URINE SODIUM: CPT | Performed by: STUDENT IN AN ORGANIZED HEALTH CARE EDUCATION/TRAINING PROGRAM

## 2025-01-01 PROCEDURE — 97112 NEUROMUSCULAR REEDUCATION: CPT

## 2025-01-01 PROCEDURE — 36415 COLL VENOUS BLD VENIPUNCTURE: CPT | Performed by: PHYSICIAN ASSISTANT

## 2025-01-01 PROCEDURE — 84295 ASSAY OF SERUM SODIUM: CPT

## 2025-01-01 PROCEDURE — 0BH17EZ INSERTION OF ENDOTRACHEAL AIRWAY INTO TRACHEA, VIA NATURAL OR ARTIFICIAL OPENING: ICD-10-PCS | Performed by: EMERGENCY MEDICINE

## 2025-01-01 PROCEDURE — 83605 ASSAY OF LACTIC ACID: CPT | Performed by: STUDENT IN AN ORGANIZED HEALTH CARE EDUCATION/TRAINING PROGRAM

## 2025-01-01 PROCEDURE — 63600175 PHARM REV CODE 636 W HCPCS: Performed by: HOSPITALIST

## 2025-01-01 PROCEDURE — 85652 RBC SED RATE AUTOMATED: CPT | Performed by: STUDENT IN AN ORGANIZED HEALTH CARE EDUCATION/TRAINING PROGRAM

## 2025-01-01 PROCEDURE — 87040 BLOOD CULTURE FOR BACTERIA: CPT | Performed by: PHYSICIAN ASSISTANT

## 2025-01-01 PROCEDURE — 97116 GAIT TRAINING THERAPY: CPT | Mod: CQ

## 2025-01-01 PROCEDURE — 99232 SBSQ HOSP IP/OBS MODERATE 35: CPT | Mod: FS,,, | Performed by: INTERNAL MEDICINE

## 2025-01-01 PROCEDURE — 94761 N-INVAS EAR/PLS OXIMETRY MLT: CPT

## 2025-01-01 PROCEDURE — 99232 SBSQ HOSP IP/OBS MODERATE 35: CPT | Mod: ,,, | Performed by: EMERGENCY MEDICINE

## 2025-01-01 PROCEDURE — 80202 ASSAY OF VANCOMYCIN: CPT | Performed by: INTERNAL MEDICINE

## 2025-01-01 PROCEDURE — 25000242 PHARM REV CODE 250 ALT 637 W/ HCPCS: Performed by: HOSPITALIST

## 2025-01-01 PROCEDURE — 81001 URINALYSIS AUTO W/SCOPE: CPT | Performed by: STUDENT IN AN ORGANIZED HEALTH CARE EDUCATION/TRAINING PROGRAM

## 2025-01-01 PROCEDURE — 87077 CULTURE AEROBIC IDENTIFY: CPT | Performed by: STUDENT IN AN ORGANIZED HEALTH CARE EDUCATION/TRAINING PROGRAM

## 2025-01-01 PROCEDURE — 83605 ASSAY OF LACTIC ACID: CPT | Performed by: HOSPITALIST

## 2025-01-01 PROCEDURE — 99223 1ST HOSP IP/OBS HIGH 75: CPT | Mod: GC,,, | Performed by: INTERNAL MEDICINE

## 2025-01-01 PROCEDURE — 80048 BASIC METABOLIC PNL TOTAL CA: CPT | Performed by: STUDENT IN AN ORGANIZED HEALTH CARE EDUCATION/TRAINING PROGRAM

## 2025-01-01 PROCEDURE — 82550 ASSAY OF CK (CPK): CPT | Performed by: INTERNAL MEDICINE

## 2025-01-01 PROCEDURE — 87086 URINE CULTURE/COLONY COUNT: CPT | Performed by: HOSPITALIST

## 2025-01-01 PROCEDURE — 0CJS8ZZ INSPECTION OF LARYNX, VIA NATURAL OR ARTIFICIAL OPENING ENDOSCOPIC: ICD-10-PCS | Performed by: OTOLARYNGOLOGY

## 2025-01-01 PROCEDURE — 51798 US URINE CAPACITY MEASURE: CPT

## 2025-01-01 PROCEDURE — 63600175 PHARM REV CODE 636 W HCPCS: Mod: JZ | Performed by: INTERNAL MEDICINE

## 2025-01-01 PROCEDURE — 83880 ASSAY OF NATRIURETIC PEPTIDE: CPT | Performed by: EMERGENCY MEDICINE

## 2025-01-01 PROCEDURE — 99291 CRITICAL CARE FIRST HOUR: CPT

## 2025-01-01 PROCEDURE — 80047 BASIC METABLC PNL IONIZED CA: CPT

## 2025-01-01 PROCEDURE — 83605 ASSAY OF LACTIC ACID: CPT | Performed by: PHYSICIAN ASSISTANT

## 2025-01-01 PROCEDURE — 82533 TOTAL CORTISOL: CPT | Performed by: HOSPITALIST

## 2025-01-01 PROCEDURE — 84439 ASSAY OF FREE THYROXINE: CPT | Performed by: HOSPITALIST

## 2025-01-01 PROCEDURE — 25000003 PHARM REV CODE 250: Performed by: EMERGENCY MEDICINE

## 2025-01-01 PROCEDURE — 82040 ASSAY OF SERUM ALBUMIN: CPT | Performed by: HOSPITALIST

## 2025-01-01 PROCEDURE — 99223 1ST HOSP IP/OBS HIGH 75: CPT | Mod: 25,,,

## 2025-01-01 PROCEDURE — 94640 AIRWAY INHALATION TREATMENT: CPT

## 2025-01-01 PROCEDURE — 84484 ASSAY OF TROPONIN QUANT: CPT | Performed by: EMERGENCY MEDICINE

## 2025-01-01 PROCEDURE — 84156 ASSAY OF PROTEIN URINE: CPT

## 2025-01-01 PROCEDURE — 85610 PROTHROMBIN TIME: CPT | Performed by: STUDENT IN AN ORGANIZED HEALTH CARE EDUCATION/TRAINING PROGRAM

## 2025-01-01 PROCEDURE — 33274 TCAT INSJ/RPL PERM LDLS PM: CPT | Mod: Q0,,, | Performed by: STUDENT IN AN ORGANIZED HEALTH CARE EDUCATION/TRAINING PROGRAM

## 2025-01-01 PROCEDURE — 36415 COLL VENOUS BLD VENIPUNCTURE: CPT | Performed by: INTERNAL MEDICINE

## 2025-01-01 PROCEDURE — 63600175 PHARM REV CODE 636 W HCPCS: Performed by: PHYSICIAN ASSISTANT

## 2025-01-01 PROCEDURE — 85730 THROMBOPLASTIN TIME PARTIAL: CPT | Performed by: STUDENT IN AN ORGANIZED HEALTH CARE EDUCATION/TRAINING PROGRAM

## 2025-01-01 PROCEDURE — 87040 BLOOD CULTURE FOR BACTERIA: CPT | Performed by: HOSPITALIST

## 2025-01-01 PROCEDURE — 84100 ASSAY OF PHOSPHORUS: CPT | Performed by: STUDENT IN AN ORGANIZED HEALTH CARE EDUCATION/TRAINING PROGRAM

## 2025-01-01 PROCEDURE — 27200966 HC CLOSED SUCTION SYSTEM

## 2025-01-01 PROCEDURE — 86140 C-REACTIVE PROTEIN: CPT | Performed by: HOSPITALIST

## 2025-01-01 PROCEDURE — 97165 OT EVAL LOW COMPLEX 30 MIN: CPT

## 2025-01-01 PROCEDURE — 33274 TCAT INSJ/RPL PERM LDLS PM: CPT | Mod: Q0 | Performed by: STUDENT IN AN ORGANIZED HEALTH CARE EDUCATION/TRAINING PROGRAM

## 2025-01-01 PROCEDURE — 37799 UNLISTED PX VASCULAR SURGERY: CPT

## 2025-01-01 PROCEDURE — 99233 SBSQ HOSP IP/OBS HIGH 50: CPT | Mod: 57,,, | Performed by: STUDENT IN AN ORGANIZED HEALTH CARE EDUCATION/TRAINING PROGRAM

## 2025-01-01 PROCEDURE — 83690 ASSAY OF LIPASE: CPT | Performed by: HOSPITALIST

## 2025-01-01 PROCEDURE — 99233 SBSQ HOSP IP/OBS HIGH 50: CPT | Mod: ,,, | Performed by: EMERGENCY MEDICINE

## 2025-01-01 PROCEDURE — 63600175 PHARM REV CODE 636 W HCPCS: Performed by: NURSE PRACTITIONER

## 2025-01-01 PROCEDURE — 84439 ASSAY OF FREE THYROXINE: CPT | Performed by: EMERGENCY MEDICINE

## 2025-01-01 PROCEDURE — 94761 N-INVAS EAR/PLS OXIMETRY MLT: CPT | Mod: XB

## 2025-01-01 PROCEDURE — 83735 ASSAY OF MAGNESIUM: CPT | Performed by: EMERGENCY MEDICINE

## 2025-01-01 PROCEDURE — C1769 GUIDE WIRE: HCPCS | Performed by: STUDENT IN AN ORGANIZED HEALTH CARE EDUCATION/TRAINING PROGRAM

## 2025-01-01 PROCEDURE — 02HK3NZ INSERTION OF INTRACARDIAC PACEMAKER INTO RIGHT VENTRICLE, PERCUTANEOUS APPROACH: ICD-10-PCS | Performed by: INTERNAL MEDICINE

## 2025-01-01 PROCEDURE — 97116 GAIT TRAINING THERAPY: CPT

## 2025-01-01 PROCEDURE — 76937 US GUIDE VASCULAR ACCESS: CPT

## 2025-01-01 PROCEDURE — 84145 PROCALCITONIN (PCT): CPT | Performed by: PHYSICIAN ASSISTANT

## 2025-01-01 PROCEDURE — 94002 VENT MGMT INPAT INIT DAY: CPT

## 2025-01-01 PROCEDURE — 83880 ASSAY OF NATRIURETIC PEPTIDE: CPT | Performed by: HOSPITALIST

## 2025-01-01 PROCEDURE — 81001 URINALYSIS AUTO W/SCOPE: CPT | Performed by: HOSPITALIST

## 2025-01-01 PROCEDURE — 82435 ASSAY OF BLOOD CHLORIDE: CPT

## 2025-01-01 PROCEDURE — 5A1223Z PERFORMANCE OF CARDIAC PACING, CONTINUOUS: ICD-10-PCS | Performed by: STUDENT IN AN ORGANIZED HEALTH CARE EDUCATION/TRAINING PROGRAM

## 2025-01-01 PROCEDURE — 83605 ASSAY OF LACTIC ACID: CPT | Performed by: INTERNAL MEDICINE

## 2025-01-01 PROCEDURE — 82248 BILIRUBIN DIRECT: CPT | Performed by: HOSPITALIST

## 2025-01-01 PROCEDURE — 84132 ASSAY OF SERUM POTASSIUM: CPT

## 2025-01-01 PROCEDURE — 25500020 PHARM REV CODE 255: Performed by: STUDENT IN AN ORGANIZED HEALTH CARE EDUCATION/TRAINING PROGRAM

## 2025-01-01 PROCEDURE — 83605 ASSAY OF LACTIC ACID: CPT

## 2025-01-01 PROCEDURE — 99900026 HC AIRWAY MAINTENANCE (STAT)

## 2025-01-01 PROCEDURE — C1785 PMKR, DUAL, RATE-RESP: HCPCS | Performed by: STUDENT IN AN ORGANIZED HEALTH CARE EDUCATION/TRAINING PROGRAM

## 2025-01-01 PROCEDURE — 85025 COMPLETE CBC W/AUTO DIFF WBC: CPT | Performed by: EMERGENCY MEDICINE

## 2025-01-01 PROCEDURE — 80202 ASSAY OF VANCOMYCIN: CPT | Performed by: STUDENT IN AN ORGANIZED HEALTH CARE EDUCATION/TRAINING PROGRAM

## 2025-01-01 PROCEDURE — 85025 COMPLETE CBC W/AUTO DIFF WBC: CPT | Performed by: PHYSICIAN ASSISTANT

## 2025-01-01 PROCEDURE — 80069 RENAL FUNCTION PANEL: CPT | Performed by: STUDENT IN AN ORGANIZED HEALTH CARE EDUCATION/TRAINING PROGRAM

## 2025-01-01 PROCEDURE — 99233 SBSQ HOSP IP/OBS HIGH 50: CPT | Mod: FS,,, | Performed by: INTERNAL MEDICINE

## 2025-01-01 PROCEDURE — 84145 PROCALCITONIN (PCT): CPT | Performed by: STUDENT IN AN ORGANIZED HEALTH CARE EDUCATION/TRAINING PROGRAM

## 2025-01-01 PROCEDURE — 85014 HEMATOCRIT: CPT

## 2025-01-01 PROCEDURE — 92610 EVALUATE SWALLOWING FUNCTION: CPT

## 2025-01-01 PROCEDURE — 37000008 HC ANESTHESIA 1ST 15 MINUTES: Performed by: STUDENT IN AN ORGANIZED HEALTH CARE EDUCATION/TRAINING PROGRAM

## 2025-01-01 PROCEDURE — 97110 THERAPEUTIC EXERCISES: CPT

## 2025-01-01 PROCEDURE — 5A1945Z RESPIRATORY VENTILATION, 24-96 CONSECUTIVE HOURS: ICD-10-PCS | Performed by: INTERNAL MEDICINE

## 2025-01-01 PROCEDURE — 82570 ASSAY OF URINE CREATININE: CPT | Performed by: STUDENT IN AN ORGANIZED HEALTH CARE EDUCATION/TRAINING PROGRAM

## 2025-01-01 PROCEDURE — 84443 ASSAY THYROID STIM HORMONE: CPT | Performed by: HOSPITALIST

## 2025-01-01 PROCEDURE — 80053 COMPREHEN METABOLIC PANEL: CPT | Performed by: EMERGENCY MEDICINE

## 2025-01-01 PROCEDURE — 84550 ASSAY OF BLOOD/URIC ACID: CPT | Performed by: HOSPITALIST

## 2025-01-01 PROCEDURE — 25000242 PHARM REV CODE 250 ALT 637 W/ HCPCS

## 2025-01-01 PROCEDURE — A9570 INDIUM IN-111 AUTO WBC: HCPCS | Mod: JZ,TB | Performed by: INTERNAL MEDICINE

## 2025-01-01 PROCEDURE — 86141 C-REACTIVE PROTEIN HS: CPT | Performed by: STUDENT IN AN ORGANIZED HEALTH CARE EDUCATION/TRAINING PROGRAM

## 2025-01-01 PROCEDURE — 84484 ASSAY OF TROPONIN QUANT: CPT | Performed by: INTERNAL MEDICINE

## 2025-01-01 PROCEDURE — C1894 INTRO/SHEATH, NON-LASER: HCPCS | Performed by: STUDENT IN AN ORGANIZED HEALTH CARE EDUCATION/TRAINING PROGRAM

## 2025-01-01 DEVICE — TPS MC2AVR1 MICRA AV2 US
Type: IMPLANTABLE DEVICE | Site: HEART | Status: FUNCTIONAL
Brand: MICRA™ AV2

## 2025-01-01 RX ORDER — CALCIUM CHLORIDE DIHYDRATE 100 MG/ML
1 INJECTION, SOLUTION INTRAVENOUS
Status: COMPLETED | OUTPATIENT
Start: 2025-01-01 | End: 2025-01-01

## 2025-01-01 RX ORDER — DIPHENHYDRAMINE HCL 25 MG
25 CAPSULE ORAL ONCE
Status: COMPLETED | OUTPATIENT
Start: 2025-01-01 | End: 2025-01-01

## 2025-01-01 RX ORDER — FUROSEMIDE 10 MG/ML
80 INJECTION INTRAMUSCULAR; INTRAVENOUS ONCE
Status: COMPLETED | OUTPATIENT
Start: 2025-01-01 | End: 2025-01-01

## 2025-01-01 RX ORDER — HEPARIN SODIUM,PORCINE/D5W 25000/250
0-40 INTRAVENOUS SOLUTION INTRAVENOUS CONTINUOUS
Status: DISCONTINUED | OUTPATIENT
Start: 2025-01-01 | End: 2025-01-01

## 2025-01-01 RX ORDER — POTASSIUM CHLORIDE 20 MEQ/1
40 TABLET, EXTENDED RELEASE ORAL ONCE
Status: COMPLETED | OUTPATIENT
Start: 2025-01-01 | End: 2025-01-01

## 2025-01-01 RX ORDER — LIDOCAINE 50 MG/G
1 PATCH TOPICAL
Status: DISCONTINUED | OUTPATIENT
Start: 2025-01-01 | End: 2025-01-01

## 2025-01-01 RX ORDER — GUAIFENESIN AND DEXTROMETHORPHAN HYDROBROMIDE 10; 100 MG/5ML; MG/5ML
10 SYRUP ORAL EVERY 4 HOURS PRN
Status: DISCONTINUED | OUTPATIENT
Start: 2025-01-01 | End: 2025-01-01

## 2025-01-01 RX ORDER — BUMETANIDE 1 MG/1
1 TABLET ORAL DAILY
Status: DISCONTINUED | OUTPATIENT
Start: 2025-01-01 | End: 2025-01-01

## 2025-01-01 RX ORDER — SENNOSIDES 8.6 MG/1
8.6 TABLET ORAL DAILY PRN
Status: DISCONTINUED | OUTPATIENT
Start: 2025-01-01 | End: 2025-01-01 | Stop reason: HOSPADM

## 2025-01-01 RX ORDER — GLUCAGON 1 MG
1 KIT INJECTION
Status: CANCELLED | OUTPATIENT
Start: 2025-01-01

## 2025-01-01 RX ORDER — ONDANSETRON 8 MG/1
8 TABLET, ORALLY DISINTEGRATING ORAL EVERY 8 HOURS PRN
Status: DISCONTINUED | OUTPATIENT
Start: 2025-01-01 | End: 2025-01-01 | Stop reason: HOSPADM

## 2025-01-01 RX ORDER — SCOPOLAMINE 1 MG/3D
1 PATCH, EXTENDED RELEASE TRANSDERMAL
Status: DISCONTINUED | OUTPATIENT
Start: 2025-01-01 | End: 2025-01-01

## 2025-01-01 RX ORDER — METOPROLOL TARTRATE 25 MG/1
12.5 TABLET ORAL 2 TIMES DAILY
Status: DISCONTINUED | OUTPATIENT
Start: 2025-01-01 | End: 2025-01-01

## 2025-01-01 RX ORDER — ATROPINE SULFATE 0.1 MG/ML
INJECTION INTRAVENOUS CODE/TRAUMA/SEDATION MEDICATION
Status: COMPLETED | OUTPATIENT
Start: 2025-01-01 | End: 2025-01-01

## 2025-01-01 RX ORDER — HYDROMORPHONE HYDROCHLORIDE 1 MG/ML
0.2 INJECTION, SOLUTION INTRAMUSCULAR; INTRAVENOUS; SUBCUTANEOUS EVERY 4 HOURS PRN
Status: COMPLETED | OUTPATIENT
Start: 2025-01-01 | End: 2025-01-01

## 2025-01-01 RX ORDER — LORAZEPAM 2 MG/ML
0.5 INJECTION INTRAMUSCULAR EVERY 30 MIN PRN
Status: DISCONTINUED | OUTPATIENT
Start: 2025-01-01 | End: 2025-01-01 | Stop reason: HOSPADM

## 2025-01-01 RX ORDER — LORAZEPAM 2 MG/ML
0.5 INJECTION INTRAMUSCULAR EVERY 30 MIN PRN
Status: DISCONTINUED | OUTPATIENT
Start: 2025-01-01 | End: 2025-01-01

## 2025-01-01 RX ORDER — TAMSULOSIN HYDROCHLORIDE 0.4 MG/1
0.4 CAPSULE ORAL ONCE
Status: COMPLETED | OUTPATIENT
Start: 2025-01-01 | End: 2025-01-01

## 2025-01-01 RX ORDER — IPRATROPIUM BROMIDE AND ALBUTEROL SULFATE 2.5; .5 MG/3ML; MG/3ML
3 SOLUTION RESPIRATORY (INHALATION) EVERY 6 HOURS PRN
Status: DISCONTINUED | OUTPATIENT
Start: 2025-01-01 | End: 2025-01-01

## 2025-01-01 RX ORDER — POLYETHYLENE GLYCOL 3350 17 G/17G
17 POWDER, FOR SOLUTION ORAL 2 TIMES DAILY
Status: DISCONTINUED | OUTPATIENT
Start: 2025-01-01 | End: 2025-01-01

## 2025-01-01 RX ORDER — CEFAZOLIN 2 G/1
2 INJECTION, POWDER, FOR SOLUTION INTRAMUSCULAR; INTRAVENOUS
Status: CANCELLED | OUTPATIENT
Start: 2025-01-01

## 2025-01-01 RX ORDER — IODIXANOL 320 MG/ML
INJECTION, SOLUTION INTRAVASCULAR
Status: DISCONTINUED | OUTPATIENT
Start: 2025-01-01 | End: 2025-01-01

## 2025-01-01 RX ORDER — KETAMINE HCL IN 0.9 % NACL 50 MG/5 ML
SYRINGE (ML) INTRAVENOUS
Status: DISPENSED
Start: 2025-01-01 | End: 2025-01-01

## 2025-01-01 RX ORDER — HEPARIN SODIUM 5000 [USP'U]/ML
5000 INJECTION, SOLUTION INTRAVENOUS; SUBCUTANEOUS EVERY 8 HOURS
Status: DISCONTINUED | OUTPATIENT
Start: 2025-01-01 | End: 2025-01-01

## 2025-01-01 RX ORDER — MAGNESIUM SULFATE 1 G/100ML
1 INJECTION INTRAVENOUS ONCE
Status: COMPLETED | OUTPATIENT
Start: 2025-01-01 | End: 2025-01-01

## 2025-01-01 RX ORDER — HYDROXYZINE HYDROCHLORIDE 25 MG/1
25 TABLET, FILM COATED ORAL 3 TIMES DAILY PRN
Status: DISCONTINUED | OUTPATIENT
Start: 2025-01-01 | End: 2025-01-01

## 2025-01-01 RX ORDER — KETAMINE HYDROCHLORIDE 50 MG/ML
80 INJECTION, SOLUTION INTRAMUSCULAR; INTRAVENOUS
Status: COMPLETED | OUTPATIENT
Start: 2025-01-01 | End: 2025-01-01

## 2025-01-01 RX ORDER — AMLODIPINE BESYLATE 5 MG/1
5 TABLET ORAL DAILY
Status: DISCONTINUED | OUTPATIENT
Start: 2025-01-01 | End: 2025-01-01

## 2025-01-01 RX ORDER — ATROPINE SULFATE 1 MG/ML
1 INJECTION, SOLUTION INTRAMUSCULAR; INTRAVENOUS; SUBCUTANEOUS
Status: DISCONTINUED | OUTPATIENT
Start: 2025-01-01 | End: 2025-01-01

## 2025-01-01 RX ORDER — AMLODIPINE BESYLATE 10 MG/1
10 TABLET ORAL DAILY
Status: DISCONTINUED | OUTPATIENT
Start: 2025-01-01 | End: 2025-01-01

## 2025-01-01 RX ORDER — SIMETHICONE 80 MG
1 TABLET,CHEWABLE ORAL 3 TIMES DAILY PRN
Status: DISCONTINUED | OUTPATIENT
Start: 2025-01-01 | End: 2025-01-01 | Stop reason: HOSPADM

## 2025-01-01 RX ORDER — NITROGLYCERIN 20 MG/100ML
0-400 INJECTION INTRAVENOUS CONTINUOUS
Status: DISCONTINUED | OUTPATIENT
Start: 2025-01-01 | End: 2025-01-01

## 2025-01-01 RX ORDER — MORPHINE SULFATE 4 MG/ML
4 INJECTION, SOLUTION INTRAMUSCULAR; INTRAVENOUS
Status: DISCONTINUED | OUTPATIENT
Start: 2025-01-01 | End: 2025-01-01 | Stop reason: HOSPADM

## 2025-01-01 RX ORDER — DEXMEDETOMIDINE HYDROCHLORIDE 4 UG/ML
0-1.4 INJECTION, SOLUTION INTRAVENOUS CONTINUOUS
Status: DISCONTINUED | OUTPATIENT
Start: 2025-01-01 | End: 2025-01-01

## 2025-01-01 RX ORDER — FENTANYL CITRATE-0.9 % NACL/PF 10 MCG/ML
0-250 PLASTIC BAG, INJECTION (ML) INTRAVENOUS CONTINUOUS
Refills: 0 | Status: DISCONTINUED | OUTPATIENT
Start: 2025-01-01 | End: 2025-01-01

## 2025-01-01 RX ORDER — POTASSIUM CHLORIDE 29.8 MG/ML
40 INJECTION INTRAVENOUS ONCE
Status: COMPLETED | OUTPATIENT
Start: 2025-01-01 | End: 2025-01-01

## 2025-01-01 RX ORDER — PANTOPRAZOLE SODIUM 40 MG/10ML
40 INJECTION, POWDER, LYOPHILIZED, FOR SOLUTION INTRAVENOUS DAILY
Status: DISCONTINUED | OUTPATIENT
Start: 2025-01-01 | End: 2025-01-01

## 2025-01-01 RX ORDER — LIDOCAINE HYDROCHLORIDE 20 MG/ML
INJECTION, SOLUTION INFILTRATION; PERINEURAL
Status: DISCONTINUED | OUTPATIENT
Start: 2025-01-01 | End: 2025-01-01

## 2025-01-01 RX ORDER — FENTANYL CITRATE 50 UG/ML
50 INJECTION, SOLUTION INTRAMUSCULAR; INTRAVENOUS
Refills: 0 | Status: ACTIVE | OUTPATIENT
Start: 2025-01-01 | End: 2025-01-01

## 2025-01-01 RX ORDER — HYDRALAZINE HYDROCHLORIDE 20 MG/ML
10 INJECTION INTRAMUSCULAR; INTRAVENOUS ONCE
Status: COMPLETED | OUTPATIENT
Start: 2025-01-01 | End: 2025-01-01

## 2025-01-01 RX ORDER — METOPROLOL TARTRATE 25 MG/1
25 TABLET, FILM COATED ORAL 2 TIMES DAILY
Status: DISCONTINUED | OUTPATIENT
Start: 2025-01-01 | End: 2025-01-01

## 2025-01-01 RX ORDER — IBUPROFEN 200 MG
24 TABLET ORAL
Status: DISCONTINUED | OUTPATIENT
Start: 2025-01-01 | End: 2025-01-01

## 2025-01-01 RX ORDER — FUROSEMIDE 10 MG/ML
60 INJECTION INTRAMUSCULAR; INTRAVENOUS ONCE
Status: COMPLETED | OUTPATIENT
Start: 2025-01-01 | End: 2025-01-01

## 2025-01-01 RX ORDER — SODIUM CHLORIDE 450 MG/100ML
INJECTION, SOLUTION INTRAVENOUS CONTINUOUS
Status: DISCONTINUED | OUTPATIENT
Start: 2025-01-01 | End: 2025-01-01

## 2025-01-01 RX ORDER — FUROSEMIDE 10 MG/ML
40 INJECTION INTRAMUSCULAR; INTRAVENOUS ONCE
Status: COMPLETED | OUTPATIENT
Start: 2025-01-01 | End: 2025-01-01

## 2025-01-01 RX ORDER — LEVOTHYROXINE SODIUM 50 UG/1
50 TABLET ORAL
Status: DISCONTINUED | OUTPATIENT
Start: 2025-01-01 | End: 2025-01-01

## 2025-01-01 RX ORDER — ACETAMINOPHEN 325 MG/1
650 TABLET ORAL EVERY 6 HOURS PRN
Status: DISCONTINUED | OUTPATIENT
Start: 2025-01-01 | End: 2025-01-01 | Stop reason: HOSPADM

## 2025-01-01 RX ORDER — HYDROMORPHONE HYDROCHLORIDE 2 MG/ML
0.5 INJECTION, SOLUTION INTRAMUSCULAR; INTRAVENOUS; SUBCUTANEOUS EVERY 4 HOURS PRN
Status: DISCONTINUED | OUTPATIENT
Start: 2025-01-01 | End: 2025-01-01

## 2025-01-01 RX ORDER — LORAZEPAM 2 MG/ML
0.25 INJECTION INTRAMUSCULAR EVERY 30 MIN PRN
Status: DISCONTINUED | OUTPATIENT
Start: 2025-01-01 | End: 2025-01-01

## 2025-01-01 RX ORDER — GLUCAGON 1 MG
1 KIT INJECTION
Status: DISCONTINUED | OUTPATIENT
Start: 2025-01-01 | End: 2025-01-01

## 2025-01-01 RX ORDER — NITROGLYCERIN 0.4 MG/1
0.4 TABLET SUBLINGUAL EVERY 5 MIN PRN
Status: DISCONTINUED | OUTPATIENT
Start: 2025-01-01 | End: 2025-01-01 | Stop reason: HOSPADM

## 2025-01-01 RX ORDER — ALBUTEROL SULFATE 2.5 MG/.5ML
10 SOLUTION RESPIRATORY (INHALATION) ONCE
Status: COMPLETED | OUTPATIENT
Start: 2025-01-01 | End: 2025-01-01

## 2025-01-01 RX ORDER — VASOPRESSIN 20 [USP'U]/ML
INJECTION, SOLUTION INTRAMUSCULAR; SUBCUTANEOUS
Status: COMPLETED
Start: 2025-01-01 | End: 2025-01-01

## 2025-01-01 RX ORDER — DEXTROSE 50 % IN WATER (D50W) INTRAVENOUS SYRINGE
50 ONCE
Status: COMPLETED | OUTPATIENT
Start: 2025-01-01 | End: 2025-01-01

## 2025-01-01 RX ORDER — SODIUM CHLORIDE 9 MG/ML
INJECTION, SOLUTION INTRAVENOUS CONTINUOUS
Status: DISCONTINUED | OUTPATIENT
Start: 2025-01-01 | End: 2025-01-01

## 2025-01-01 RX ORDER — POLYETHYLENE GLYCOL 3350 17 G/17G
17 POWDER, FOR SOLUTION ORAL 2 TIMES DAILY PRN
Status: DISCONTINUED | OUTPATIENT
Start: 2025-01-01 | End: 2025-01-01 | Stop reason: HOSPADM

## 2025-01-01 RX ORDER — HYDRALAZINE HYDROCHLORIDE 25 MG/1
25 TABLET, FILM COATED ORAL EVERY 8 HOURS
Status: DISCONTINUED | OUTPATIENT
Start: 2025-01-01 | End: 2025-01-01

## 2025-01-01 RX ORDER — FENTANYL CITRATE 50 UG/ML
25 INJECTION, SOLUTION INTRAMUSCULAR; INTRAVENOUS EVERY 5 MIN PRN
Refills: 0 | Status: CANCELLED | OUTPATIENT
Start: 2025-01-01

## 2025-01-01 RX ORDER — PROCHLORPERAZINE EDISYLATE 5 MG/ML
5 INJECTION INTRAMUSCULAR; INTRAVENOUS ONCE
Status: COMPLETED | OUTPATIENT
Start: 2025-01-01 | End: 2025-01-01

## 2025-01-01 RX ORDER — DEXTROSE MONOHYDRATE 50 MG/ML
INJECTION, SOLUTION INTRAVENOUS CONTINUOUS
Status: ACTIVE | OUTPATIENT
Start: 2025-01-01 | End: 2025-01-01

## 2025-01-01 RX ORDER — MORPHINE SULFATE 4 MG/ML
4 INJECTION, SOLUTION INTRAMUSCULAR; INTRAVENOUS EVERY 4 HOURS PRN
Status: DISCONTINUED | OUTPATIENT
Start: 2025-01-01 | End: 2025-01-01

## 2025-01-01 RX ORDER — ACETAMINOPHEN 325 MG/1
650 TABLET ORAL EVERY 6 HOURS PRN
Status: DISCONTINUED | OUTPATIENT
Start: 2025-01-01 | End: 2025-01-01

## 2025-01-01 RX ORDER — ROCURONIUM BROMIDE 10 MG/ML
100 INJECTION, SOLUTION INTRAVENOUS ONCE
Status: COMPLETED | OUTPATIENT
Start: 2025-01-01 | End: 2025-01-01

## 2025-01-01 RX ORDER — FENTANYL CITRATE 50 UG/ML
50 INJECTION, SOLUTION INTRAMUSCULAR; INTRAVENOUS
Refills: 0 | Status: DISCONTINUED | OUTPATIENT
Start: 2025-01-01 | End: 2025-01-01

## 2025-01-01 RX ORDER — OXYCODONE HYDROCHLORIDE 5 MG/1
5 TABLET ORAL
Refills: 0 | Status: CANCELLED | OUTPATIENT
Start: 2025-01-01

## 2025-01-01 RX ORDER — AMLODIPINE BESYLATE 5 MG/1
5 TABLET ORAL ONCE
Status: COMPLETED | OUTPATIENT
Start: 2025-01-01 | End: 2025-01-01

## 2025-01-01 RX ORDER — FUROSEMIDE 10 MG/ML
20 INJECTION INTRAMUSCULAR; INTRAVENOUS ONCE
Status: COMPLETED | OUTPATIENT
Start: 2025-01-01 | End: 2025-01-01

## 2025-01-01 RX ORDER — HYDRALAZINE HYDROCHLORIDE 10 MG/1
10 TABLET, FILM COATED ORAL EVERY 8 HOURS PRN
Status: DISCONTINUED | OUTPATIENT
Start: 2025-01-01 | End: 2025-01-01

## 2025-01-01 RX ORDER — AMOXICILLIN AND CLAVULANATE POTASSIUM 250; 62.5 MG/5ML; MG/5ML
500 POWDER, FOR SUSPENSION ORAL EVERY 12 HOURS
Status: COMPLETED | OUTPATIENT
Start: 2025-01-01 | End: 2025-01-01

## 2025-01-01 RX ORDER — SENNOSIDES 8.6 MG/1
8.6 TABLET ORAL DAILY
Status: DISCONTINUED | OUTPATIENT
Start: 2025-01-01 | End: 2025-01-01

## 2025-01-01 RX ORDER — POTASSIUM CHLORIDE 14.9 MG/ML
20 INJECTION INTRAVENOUS ONCE
Status: COMPLETED | OUTPATIENT
Start: 2025-01-01 | End: 2025-01-01

## 2025-01-01 RX ORDER — DEXTROSE MONOHYDRATE 50 MG/ML
INJECTION, SOLUTION INTRAVENOUS CONTINUOUS
Status: DISCONTINUED | OUTPATIENT
Start: 2025-01-01 | End: 2025-01-01

## 2025-01-01 RX ORDER — NOREPINEPHRINE BITARTRATE 0.02 MG/ML
0-3 INJECTION, SOLUTION INTRAVENOUS CONTINUOUS
Status: DISCONTINUED | OUTPATIENT
Start: 2025-01-01 | End: 2025-01-01

## 2025-01-01 RX ORDER — ONDANSETRON HYDROCHLORIDE 2 MG/ML
4 INJECTION, SOLUTION INTRAVENOUS DAILY PRN
Status: CANCELLED | OUTPATIENT
Start: 2025-01-01

## 2025-01-01 RX ORDER — POTASSIUM CHLORIDE 750 MG/1
30 CAPSULE, EXTENDED RELEASE ORAL EVERY 4 HOURS
Status: COMPLETED | OUTPATIENT
Start: 2025-01-01 | End: 2025-01-01

## 2025-01-01 RX ORDER — POTASSIUM CHLORIDE 29.8 MG/ML
40 INJECTION INTRAVENOUS ONCE
Status: DISCONTINUED | OUTPATIENT
Start: 2025-01-01 | End: 2025-01-01

## 2025-01-01 RX ORDER — ROCURONIUM BROMIDE 10 MG/ML
INJECTION, SOLUTION INTRAVENOUS
Status: COMPLETED
Start: 2025-01-01 | End: 2025-01-01

## 2025-01-01 RX ORDER — LORAZEPAM 2 MG/ML
1 INJECTION INTRAMUSCULAR EVERY 4 HOURS PRN
Status: DISCONTINUED | OUTPATIENT
Start: 2025-01-01 | End: 2025-01-01

## 2025-01-01 RX ORDER — IBUPROFEN 200 MG
16 TABLET ORAL
Status: DISCONTINUED | OUTPATIENT
Start: 2025-01-01 | End: 2025-01-01

## 2025-01-01 RX ORDER — NOREPINEPHRINE BITARTRATE 0.02 MG/ML
INJECTION, SOLUTION INTRAVENOUS
Status: COMPLETED
Start: 2025-01-01 | End: 2025-01-01

## 2025-01-01 RX ORDER — INDIUM IN-111 OXYQUINOLINE 1 UG/ML
0.53 SOLUTION INTRAVENOUS
Status: COMPLETED | OUTPATIENT
Start: 2025-01-01 | End: 2025-01-01

## 2025-01-01 RX ORDER — SODIUM CHLORIDE 0.9 % (FLUSH) 0.9 %
3 SYRINGE (ML) INJECTION
Status: CANCELLED | OUTPATIENT
Start: 2025-01-01

## 2025-01-01 RX ORDER — ALBUTEROL SULFATE 2.5 MG/.5ML
5 SOLUTION RESPIRATORY (INHALATION)
Status: COMPLETED | OUTPATIENT
Start: 2025-01-01 | End: 2025-01-01

## 2025-01-01 RX ORDER — GLYCOPYRROLATE 0.2 MG/ML
0.4 INJECTION INTRAMUSCULAR; INTRAVENOUS EVERY 4 HOURS PRN
Status: DISCONTINUED | OUTPATIENT
Start: 2025-01-01 | End: 2025-01-01 | Stop reason: HOSPADM

## 2025-01-01 RX ORDER — HEPARIN SOD,PORCINE/0.9 % NACL 1000/500ML
INTRAVENOUS SOLUTION INTRAVENOUS
Status: DISCONTINUED | OUTPATIENT
Start: 2025-01-01 | End: 2025-01-01

## 2025-01-01 RX ORDER — POTASSIUM CHLORIDE 29.8 MG/ML
20 INJECTION INTRAVENOUS ONCE
Status: COMPLETED | OUTPATIENT
Start: 2025-01-01 | End: 2025-01-01

## 2025-01-01 RX ORDER — MUPIROCIN 20 MG/G
OINTMENT TOPICAL 2 TIMES DAILY
Status: COMPLETED | OUTPATIENT
Start: 2025-01-01 | End: 2025-01-01

## 2025-01-01 RX ORDER — SODIUM CHLORIDE 0.9 % (FLUSH) 0.9 %
10 SYRINGE (ML) INJECTION
Status: DISCONTINUED | OUTPATIENT
Start: 2025-01-01 | End: 2025-01-01

## 2025-01-01 RX ADMIN — HEPARIN SODIUM 5000 UNITS: 5000 INJECTION INTRAVENOUS; SUBCUTANEOUS at 02:04

## 2025-01-01 RX ADMIN — LEVOTHYROXINE SODIUM 50 MCG: 0.05 TABLET ORAL at 05:04

## 2025-01-01 RX ADMIN — POTASSIUM CHLORIDE 40 MEQ: 29.8 INJECTION, SOLUTION INTRAVENOUS at 01:04

## 2025-01-01 RX ADMIN — MORPHINE SULFATE 4 MG: 4 INJECTION INTRAVENOUS at 12:05

## 2025-01-01 RX ADMIN — INDIUM IN-111 OXYQUINOLINE 0.53 MILLICURIE: 1 SOLUTION INTRAVENOUS at 11:04

## 2025-01-01 RX ADMIN — SODIUM CHLORIDE, POTASSIUM CHLORIDE, SODIUM LACTATE AND CALCIUM CHLORIDE 500 ML: 600; 310; 30; 20 INJECTION, SOLUTION INTRAVENOUS at 11:04

## 2025-01-01 RX ADMIN — PIPERACILLIN SODIUM AND TAZOBACTAM SODIUM 4.5 G: 4; .5 INJECTION, POWDER, FOR SOLUTION INTRAVENOUS at 03:04

## 2025-01-01 RX ADMIN — LEVOTHYROXINE SODIUM 50 MCG: 0.05 TABLET ORAL at 05:05

## 2025-01-01 RX ADMIN — METOPROLOL TARTRATE 12.5 MG: 25 TABLET, FILM COATED ORAL at 08:04

## 2025-01-01 RX ADMIN — HYDRALAZINE HYDROCHLORIDE 25 MG: 25 TABLET ORAL at 10:04

## 2025-01-01 RX ADMIN — PIPERACILLIN SODIUM AND TAZOBACTAM SODIUM 4.5 G: 4; .5 INJECTION, POWDER, FOR SOLUTION INTRAVENOUS at 05:04

## 2025-01-01 RX ADMIN — LIDOCAINE 5% 1 PATCH: 700 PATCH TOPICAL at 12:04

## 2025-01-01 RX ADMIN — MUPIROCIN: 20 OINTMENT TOPICAL at 09:04

## 2025-01-01 RX ADMIN — PIPERACILLIN SODIUM AND TAZOBACTAM SODIUM 4.5 G: 4; .5 INJECTION, POWDER, FOR SOLUTION INTRAVENOUS at 08:04

## 2025-01-01 RX ADMIN — MICAFUNGIN SODIUM 100 MG: 100 INJECTION, POWDER, LYOPHILIZED, FOR SOLUTION INTRAVENOUS at 05:04

## 2025-01-01 RX ADMIN — MEROPENEM 1 G: 1 INJECTION INTRAVENOUS at 06:04

## 2025-01-01 RX ADMIN — DEXMEDETOMIDINE HYDROCHLORIDE 0.2 MCG/KG/HR: 4 INJECTION INTRAVENOUS at 05:04

## 2025-01-01 RX ADMIN — METOPROLOL TARTRATE 25 MG: 25 TABLET, FILM COATED ORAL at 08:04

## 2025-01-01 RX ADMIN — APIXABAN 10 MG: 5 TABLET, FILM COATED ORAL at 08:04

## 2025-01-01 RX ADMIN — LEVOTHYROXINE SODIUM 50 MCG: 0.05 TABLET ORAL at 06:04

## 2025-01-01 RX ADMIN — FUROSEMIDE 60 MG: 10 INJECTION, SOLUTION INTRAMUSCULAR; INTRAVENOUS at 12:04

## 2025-01-01 RX ADMIN — NITROGLYCERIN 0.4 MG: 0.4 TABLET, ORALLY DISINTEGRATING SUBLINGUAL at 06:04

## 2025-01-01 RX ADMIN — DEXMEDETOMIDINE HYDROCHLORIDE 0.7 MCG/KG/HR: 4 INJECTION INTRAVENOUS at 07:04

## 2025-01-01 RX ADMIN — SENNOSIDES 8.6 MG: 8.6 TABLET, FILM COATED ORAL at 08:04

## 2025-01-01 RX ADMIN — MICONAZOLE NITRATE: 20 OINTMENT TOPICAL at 09:04

## 2025-01-01 RX ADMIN — LORAZEPAM 0.5 MG: 2 INJECTION INTRAMUSCULAR; INTRAVENOUS at 02:04

## 2025-01-01 RX ADMIN — Medication 50 MCG/HR: at 02:04

## 2025-01-01 RX ADMIN — HEPARIN SODIUM 5000 UNITS: 5000 INJECTION INTRAVENOUS; SUBCUTANEOUS at 09:04

## 2025-01-01 RX ADMIN — HYDRALAZINE HYDROCHLORIDE 25 MG: 25 TABLET ORAL at 02:04

## 2025-01-01 RX ADMIN — APIXABAN 5 MG: 5 TABLET, FILM COATED ORAL at 08:05

## 2025-01-01 RX ADMIN — INSULIN HUMAN 6 UNITS: 100 INJECTION, SOLUTION PARENTERAL at 04:04

## 2025-01-01 RX ADMIN — MEROPENEM 1 G: 1 INJECTION INTRAVENOUS at 05:04

## 2025-01-01 RX ADMIN — HYDROMORPHONE HYDROCHLORIDE 0.2 MG: 1 INJECTION, SOLUTION INTRAMUSCULAR; INTRAVENOUS; SUBCUTANEOUS at 08:05

## 2025-01-01 RX ADMIN — SODIUM CHLORIDE, POTASSIUM CHLORIDE, SODIUM LACTATE AND CALCIUM CHLORIDE 500 ML: 600; 310; 30; 20 INJECTION, SOLUTION INTRAVENOUS at 04:04

## 2025-01-01 RX ADMIN — DAPTOMYCIN 825 MG: 350 INJECTION, POWDER, LYOPHILIZED, FOR SOLUTION INTRAVENOUS at 02:04

## 2025-01-01 RX ADMIN — IPRATROPIUM BROMIDE AND ALBUTEROL SULFATE 3 ML: 2.5; .5 SOLUTION RESPIRATORY (INHALATION) at 08:04

## 2025-01-01 RX ADMIN — HEPARIN SODIUM 5000 UNITS: 5000 INJECTION INTRAVENOUS; SUBCUTANEOUS at 03:04

## 2025-01-01 RX ADMIN — MICONAZOLE NITRATE: 20 OINTMENT TOPICAL at 08:04

## 2025-01-01 RX ADMIN — SODIUM CHLORIDE: 9 INJECTION, SOLUTION INTRAVENOUS at 02:04

## 2025-01-01 RX ADMIN — VANCOMYCIN HYDROCHLORIDE 500 MG: 500 INJECTION, POWDER, LYOPHILIZED, FOR SOLUTION INTRAVENOUS at 08:04

## 2025-01-01 RX ADMIN — VANCOMYCIN HYDROCHLORIDE 750 MG: 750 INJECTION, POWDER, LYOPHILIZED, FOR SOLUTION INTRAVENOUS at 09:04

## 2025-01-01 RX ADMIN — AMLODIPINE BESYLATE 10 MG: 10 TABLET ORAL at 09:04

## 2025-01-01 RX ADMIN — NITROGLYCERIN 0.4 MG: 0.4 TABLET, ORALLY DISINTEGRATING SUBLINGUAL at 12:04

## 2025-01-01 RX ADMIN — HYDRALAZINE HYDROCHLORIDE 25 MG: 25 TABLET ORAL at 09:04

## 2025-01-01 RX ADMIN — METOPROLOL TARTRATE 12.5 MG: 25 TABLET, FILM COATED ORAL at 09:04

## 2025-01-01 RX ADMIN — FUROSEMIDE 40 MG: 10 INJECTION, SOLUTION INTRAVENOUS at 10:04

## 2025-01-01 RX ADMIN — EPINEPHRINE 0.2 MCG/KG/MIN: 1 INJECTION INTRAMUSCULAR; INTRAVENOUS; SUBCUTANEOUS at 02:04

## 2025-01-01 RX ADMIN — HYDRALAZINE HYDROCHLORIDE 25 MG: 25 TABLET ORAL at 05:04

## 2025-01-01 RX ADMIN — DEXMEDETOMIDINE HYDROCHLORIDE 1 MCG/KG/HR: 4 INJECTION INTRAVENOUS at 09:04

## 2025-01-01 RX ADMIN — FUROSEMIDE 40 MG: 10 INJECTION, SOLUTION INTRAVENOUS at 09:04

## 2025-01-01 RX ADMIN — LIDOCAINE 5% 1 PATCH: 700 PATCH TOPICAL at 11:05

## 2025-01-01 RX ADMIN — MICONAZOLE NITRATE: 20 OINTMENT TOPICAL at 10:04

## 2025-01-01 RX ADMIN — APIXABAN 5 MG: 5 TABLET, FILM COATED ORAL at 08:04

## 2025-01-01 RX ADMIN — POLYETHYLENE GLYCOL 3350 17 G: 17 POWDER, FOR SOLUTION ORAL at 09:04

## 2025-01-01 RX ADMIN — DEXTROSE MONOHYDRATE 50 G: 25 INJECTION, SOLUTION INTRAVENOUS at 04:04

## 2025-01-01 RX ADMIN — GLYCOPYRROLATE 0.4 MG: 0.2 INJECTION, SOLUTION INTRAMUSCULAR; INTRAVENOUS at 03:05

## 2025-01-01 RX ADMIN — LORAZEPAM 0.25 MG: 2 INJECTION INTRAMUSCULAR; INTRAVENOUS at 03:05

## 2025-01-01 RX ADMIN — SODIUM CHLORIDE 500 ML: 9 INJECTION, SOLUTION INTRAVENOUS at 03:04

## 2025-01-01 RX ADMIN — PIPERACILLIN SODIUM AND TAZOBACTAM SODIUM 4.5 G: 4; .5 INJECTION, POWDER, FOR SOLUTION INTRAVENOUS at 12:04

## 2025-01-01 RX ADMIN — HYDRALAZINE HYDROCHLORIDE 10 MG: 10 TABLET ORAL at 08:04

## 2025-01-01 RX ADMIN — NITROGLYCERIN 0.4 MG: 0.4 TABLET, ORALLY DISINTEGRATING SUBLINGUAL at 10:04

## 2025-01-01 RX ADMIN — MUPIROCIN: 20 OINTMENT TOPICAL at 10:04

## 2025-01-01 RX ADMIN — MORPHINE SULFATE 4 MG: 4 INJECTION INTRAVENOUS at 04:05

## 2025-01-01 RX ADMIN — SCOPOLAMINE 1 PATCH: 1.5 PATCH, EXTENDED RELEASE TRANSDERMAL at 08:04

## 2025-01-01 RX ADMIN — SODIUM ZIRCONIUM CYCLOSILICATE 10 G: 10 POWDER, FOR SUSPENSION ORAL at 09:04

## 2025-01-01 RX ADMIN — DIPHENHYDRAMINE HYDROCHLORIDE 25 MG: 25 CAPSULE ORAL at 02:04

## 2025-01-01 RX ADMIN — APIXABAN 5 MG: 5 TABLET, FILM COATED ORAL at 09:05

## 2025-01-01 RX ADMIN — SIMETHICONE 80 MG: 80 TABLET, CHEWABLE ORAL at 10:04

## 2025-01-01 RX ADMIN — AMOXICILLIN AND CLAVULANATE POTASSIUM 500 MG: 250; 62.5 POWDER, FOR SUSPENSION ORAL at 11:04

## 2025-01-01 RX ADMIN — HYDROMORPHONE HYDROCHLORIDE 0.2 MG: 1 INJECTION, SOLUTION INTRAMUSCULAR; INTRAVENOUS; SUBCUTANEOUS at 11:05

## 2025-01-01 RX ADMIN — NITROGLYCERIN 5 MCG/MIN: 20 INJECTION INTRAVENOUS at 03:04

## 2025-01-01 RX ADMIN — SODIUM CHLORIDE, POTASSIUM CHLORIDE, SODIUM LACTATE AND CALCIUM CHLORIDE 250 ML: 600; 310; 30; 20 INJECTION, SOLUTION INTRAVENOUS at 07:04

## 2025-01-01 RX ADMIN — LEVOTHYROXINE SODIUM 50 MCG: 0.05 TABLET ORAL at 06:05

## 2025-01-01 RX ADMIN — DAPTOMYCIN 825 MG: 350 INJECTION, POWDER, LYOPHILIZED, FOR SOLUTION INTRAVENOUS at 05:04

## 2025-01-01 RX ADMIN — BUMETANIDE 1 MG: 1 TABLET ORAL at 08:04

## 2025-01-01 RX ADMIN — AMOXICILLIN AND CLAVULANATE POTASSIUM 500 MG: 250; 62.5 POWDER, FOR SUSPENSION ORAL at 09:04

## 2025-01-01 RX ADMIN — POTASSIUM BICARBONATE 25 MEQ: 978 TABLET, EFFERVESCENT ORAL at 08:04

## 2025-01-01 RX ADMIN — DEXMEDETOMIDINE HYDROCHLORIDE 1.4 MCG/KG/HR: 4 INJECTION INTRAVENOUS at 10:04

## 2025-01-01 RX ADMIN — FUROSEMIDE 20 MG: 10 INJECTION, SOLUTION INTRAMUSCULAR; INTRAVENOUS at 11:04

## 2025-01-01 RX ADMIN — DEXTROSE MONOHYDRATE 12.5 G: 25 INJECTION, SOLUTION INTRAVENOUS at 11:04

## 2025-01-01 RX ADMIN — APIXABAN 10 MG: 5 TABLET, FILM COATED ORAL at 09:04

## 2025-01-01 RX ADMIN — AMOXICILLIN AND CLAVULANATE POTASSIUM 500 MG: 250; 62.5 POWDER, FOR SUSPENSION ORAL at 10:04

## 2025-01-01 RX ADMIN — METOPROLOL TARTRATE 12.5 MG: 25 TABLET, FILM COATED ORAL at 08:05

## 2025-01-01 RX ADMIN — DEXTROSE MONOHYDRATE: 5 INJECTION INTRAVENOUS at 04:04

## 2025-01-01 RX ADMIN — HYDRALAZINE HYDROCHLORIDE 10 MG: 20 INJECTION, SOLUTION INTRAMUSCULAR; INTRAVENOUS at 05:04

## 2025-01-01 RX ADMIN — POLYETHYLENE GLYCOL 3350 17 G: 17 POWDER, FOR SOLUTION ORAL at 08:04

## 2025-01-01 RX ADMIN — POTASSIUM CHLORIDE 40 MEQ: 29.8 INJECTION, SOLUTION INTRAVENOUS at 06:04

## 2025-01-01 RX ADMIN — MORPHINE SULFATE 4 MG: 4 INJECTION INTRAVENOUS at 05:05

## 2025-01-01 RX ADMIN — HYDRALAZINE HYDROCHLORIDE 25 MG: 25 TABLET ORAL at 03:04

## 2025-01-01 RX ADMIN — LEVOTHYROXINE SODIUM 50 MCG: 0.05 TABLET ORAL at 09:04

## 2025-01-01 RX ADMIN — SENNOSIDES 8.6 MG: 8.6 TABLET, FILM COATED ORAL at 03:04

## 2025-01-01 RX ADMIN — SIMETHICONE 80 MG: 80 TABLET, CHEWABLE ORAL at 03:04

## 2025-01-01 RX ADMIN — VASOPRESSIN: 20 INJECTION INTRAVENOUS at 07:04

## 2025-01-01 RX ADMIN — MORPHINE SULFATE 4 MG: 4 INJECTION INTRAVENOUS at 03:05

## 2025-01-01 RX ADMIN — LORAZEPAM 0.25 MG: 2 INJECTION INTRAMUSCULAR; INTRAVENOUS at 01:05

## 2025-01-01 RX ADMIN — SODIUM CHLORIDE 500 ML: 9 INJECTION, SOLUTION INTRAVENOUS at 11:04

## 2025-01-01 RX ADMIN — HYDRALAZINE HYDROCHLORIDE 25 MG: 25 TABLET ORAL at 01:04

## 2025-01-01 RX ADMIN — HEPARIN SODIUM 5000 UNITS: 5000 INJECTION INTRAVENOUS; SUBCUTANEOUS at 06:04

## 2025-01-01 RX ADMIN — LIDOCAINE 5% 1 PATCH: 700 PATCH TOPICAL at 11:04

## 2025-01-01 RX ADMIN — MAGNESIUM SULFATE HEPTAHYDRATE 1 G: 500 INJECTION, SOLUTION INTRAMUSCULAR; INTRAVENOUS at 12:04

## 2025-01-01 RX ADMIN — ACETAMINOPHEN 650 MG: 325 TABLET ORAL at 02:04

## 2025-01-01 RX ADMIN — PANTOPRAZOLE SODIUM 40 MG: 40 INJECTION, POWDER, FOR SOLUTION INTRAVENOUS at 03:04

## 2025-01-01 RX ADMIN — PANTOPRAZOLE SODIUM 40 MG: 40 INJECTION, POWDER, FOR SOLUTION INTRAVENOUS at 09:04

## 2025-01-01 RX ADMIN — ALBUTEROL SULFATE 10 MG: 2.5 SOLUTION RESPIRATORY (INHALATION) at 05:04

## 2025-01-01 RX ADMIN — MORPHINE SULFATE 4 MG: 4 INJECTION INTRAVENOUS at 02:05

## 2025-01-01 RX ADMIN — PIPERACILLIN SODIUM AND TAZOBACTAM SODIUM 4.5 G: 4; .5 INJECTION, POWDER, FOR SOLUTION INTRAVENOUS at 02:04

## 2025-01-01 RX ADMIN — AMLODIPINE BESYLATE 10 MG: 10 TABLET ORAL at 08:04

## 2025-01-01 RX ADMIN — VASOPRESSIN 0.04 UNITS/MIN: 20 INJECTION INTRAVENOUS at 09:04

## 2025-01-01 RX ADMIN — ACETAMINOPHEN 650 MG: 325 TABLET ORAL at 05:04

## 2025-01-01 RX ADMIN — DEXTROSE MONOHYDRATE: 50 INJECTION, SOLUTION INTRAVENOUS at 04:04

## 2025-01-01 RX ADMIN — AMLODIPINE BESYLATE 5 MG: 5 TABLET ORAL at 09:04

## 2025-01-01 RX ADMIN — SODIUM CHLORIDE, POTASSIUM CHLORIDE, SODIUM LACTATE AND CALCIUM CHLORIDE 250 ML: 600; 310; 30; 20 INJECTION, SOLUTION INTRAVENOUS at 09:04

## 2025-01-01 RX ADMIN — LIDOCAINE 5% 1 PATCH: 700 PATCH TOPICAL at 03:04

## 2025-01-01 RX ADMIN — KETAMINE HYDROCHLORIDE 80 MG: 50 INJECTION, SOLUTION INTRAMUSCULAR; INTRAVENOUS at 02:04

## 2025-01-01 RX ADMIN — FUROSEMIDE 80 MG: 10 INJECTION, SOLUTION INTRAVENOUS at 12:04

## 2025-01-01 RX ADMIN — MUPIROCIN: 20 OINTMENT TOPICAL at 08:04

## 2025-01-01 RX ADMIN — HYDROXYZINE HYDROCHLORIDE 25 MG: 25 TABLET, FILM COATED ORAL at 02:04

## 2025-01-01 RX ADMIN — NITROGLYCERIN 10 MCG/MIN: 20 INJECTION INTRAVENOUS at 12:04

## 2025-01-01 RX ADMIN — FUROSEMIDE 80 MG: 10 INJECTION, SOLUTION INTRAVENOUS at 08:04

## 2025-01-01 RX ADMIN — DEXMEDETOMIDINE HYDROCHLORIDE 1 MCG/KG/HR: 4 INJECTION INTRAVENOUS at 02:04

## 2025-01-01 RX ADMIN — HYDRALAZINE HYDROCHLORIDE 25 MG: 25 TABLET ORAL at 06:04

## 2025-01-01 RX ADMIN — APIXABAN 5 MG: 5 TABLET, FILM COATED ORAL at 09:04

## 2025-01-01 RX ADMIN — DEXTROSE MONOHYDRATE: 50 INJECTION, SOLUTION INTRAVENOUS at 10:04

## 2025-01-01 RX ADMIN — ROCURONIUM BROMIDE 100 MG: 10 INJECTION, SOLUTION INTRAVENOUS at 02:04

## 2025-01-01 RX ADMIN — VANCOMYCIN HYDROCHLORIDE 1250 MG: 1.25 INJECTION, POWDER, LYOPHILIZED, FOR SOLUTION INTRAVENOUS at 05:04

## 2025-01-01 RX ADMIN — LIDOCAINE 5% 1 PATCH: 700 PATCH TOPICAL at 10:05

## 2025-01-01 RX ADMIN — PIPERACILLIN SODIUM AND TAZOBACTAM SODIUM 4.5 G: 4; .5 INJECTION, POWDER, FOR SOLUTION INTRAVENOUS at 01:04

## 2025-01-01 RX ADMIN — IPRATROPIUM BROMIDE AND ALBUTEROL SULFATE 3 ML: 2.5; .5 SOLUTION RESPIRATORY (INHALATION) at 12:04

## 2025-01-01 RX ADMIN — DAPTOMYCIN 825 MG: 350 INJECTION, POWDER, LYOPHILIZED, FOR SOLUTION INTRAVENOUS at 03:04

## 2025-01-01 RX ADMIN — POTASSIUM CHLORIDE 20 MEQ: 14.9 INJECTION, SOLUTION INTRAVENOUS at 04:04

## 2025-01-01 RX ADMIN — POTASSIUM CHLORIDE 30 MEQ: 750 CAPSULE, EXTENDED RELEASE ORAL at 10:04

## 2025-01-01 RX ADMIN — METOPROLOL TARTRATE 12.5 MG: 25 TABLET, FILM COATED ORAL at 09:05

## 2025-01-01 RX ADMIN — SCOPOLAMINE 1 PATCH: 1.5 PATCH, EXTENDED RELEASE TRANSDERMAL at 07:04

## 2025-01-01 RX ADMIN — DEXTROSE MONOHYDRATE: 5 INJECTION INTRAVENOUS at 08:04

## 2025-01-01 RX ADMIN — IPRATROPIUM BROMIDE AND ALBUTEROL SULFATE 3 ML: 2.5; .5 SOLUTION RESPIRATORY (INHALATION) at 07:04

## 2025-01-01 RX ADMIN — LORAZEPAM 0.25 MG: 2 INJECTION INTRAMUSCULAR; INTRAVENOUS at 11:05

## 2025-01-01 RX ADMIN — SODIUM CHLORIDE 250 ML: 9 INJECTION, SOLUTION INTRAVENOUS at 11:04

## 2025-01-01 RX ADMIN — FENTANYL CITRATE 50 MCG: 50 INJECTION INTRAMUSCULAR; INTRAVENOUS at 11:04

## 2025-01-01 RX ADMIN — HEPARIN SODIUM 5000 UNITS: 5000 INJECTION INTRAVENOUS; SUBCUTANEOUS at 05:04

## 2025-01-01 RX ADMIN — FUROSEMIDE 40 MG: 10 INJECTION, SOLUTION INTRAVENOUS at 01:04

## 2025-01-01 RX ADMIN — SODIUM ZIRCONIUM CYCLOSILICATE 5 G: 5 POWDER, FOR SUSPENSION ORAL at 05:04

## 2025-01-01 RX ADMIN — HYDROMORPHONE HYDROCHLORIDE 0.5 MG: 2 INJECTION, SOLUTION INTRAMUSCULAR; INTRAVENOUS; SUBCUTANEOUS at 02:04

## 2025-01-01 RX ADMIN — PIPERACILLIN SODIUM AND TAZOBACTAM SODIUM 4.5 G: 4; .5 INJECTION, POWDER, FOR SOLUTION INTRAVENOUS at 07:04

## 2025-01-01 RX ADMIN — PROCHLORPERAZINE EDISYLATE 5 MG: 5 INJECTION INTRAMUSCULAR; INTRAVENOUS at 11:04

## 2025-01-01 RX ADMIN — HYDROMORPHONE HYDROCHLORIDE 0.2 MG: 1 INJECTION, SOLUTION INTRAMUSCULAR; INTRAVENOUS; SUBCUTANEOUS at 10:05

## 2025-01-01 RX ADMIN — HEPARIN SODIUM 5000 UNITS: 5000 INJECTION INTRAVENOUS; SUBCUTANEOUS at 10:04

## 2025-01-01 RX ADMIN — DEXMEDETOMIDINE HYDROCHLORIDE 0.7 MCG/KG/HR: 4 INJECTION INTRAVENOUS at 12:04

## 2025-01-01 RX ADMIN — HYDROXYZINE HYDROCHLORIDE 25 MG: 25 TABLET, FILM COATED ORAL at 06:04

## 2025-01-01 RX ADMIN — FUROSEMIDE 40 MG: 10 INJECTION, SOLUTION INTRAVENOUS at 02:04

## 2025-01-01 RX ADMIN — HYDRALAZINE HYDROCHLORIDE 25 MG: 25 TABLET ORAL at 08:04

## 2025-01-01 RX ADMIN — AMLODIPINE BESYLATE 5 MG: 5 TABLET ORAL at 10:04

## 2025-01-01 RX ADMIN — DEXMEDETOMIDINE HYDROCHLORIDE 0.7 MCG/KG/HR: 4 INJECTION INTRAVENOUS at 03:04

## 2025-01-01 RX ADMIN — VANCOMYCIN HYDROCHLORIDE 1500 MG: 1.5 INJECTION, POWDER, LYOPHILIZED, FOR SOLUTION INTRAVENOUS at 11:04

## 2025-01-01 RX ADMIN — METOPROLOL TARTRATE 25 MG: 25 TABLET, FILM COATED ORAL at 10:04

## 2025-01-01 RX ADMIN — ONDANSETRON 8 MG: 8 TABLET, ORALLY DISINTEGRATING ORAL at 12:04

## 2025-01-01 RX ADMIN — DEXTROSE MONOHYDRATE 12.5 G: 25 INJECTION, SOLUTION INTRAVENOUS at 02:04

## 2025-01-01 RX ADMIN — TAMSULOSIN HYDROCHLORIDE 0.4 MG: 0.4 CAPSULE ORAL at 05:04

## 2025-01-01 RX ADMIN — MAGNESIUM SULFATE 1 G: 500 INJECTION, SOLUTION INTRAMUSCULAR; INTRAVENOUS at 07:04

## 2025-01-01 RX ADMIN — POLYETHYLENE GLYCOL 3350 17 G: 17 POWDER, FOR SOLUTION ORAL at 03:04

## 2025-01-01 RX ADMIN — ALBUTEROL SULFATE 10 MG: 2.5 SOLUTION RESPIRATORY (INHALATION) at 09:04

## 2025-01-01 RX ADMIN — POTASSIUM CHLORIDE 20 MEQ: 29.8 INJECTION, SOLUTION INTRAVENOUS at 11:04

## 2025-01-01 RX ADMIN — POTASSIUM BICARBONATE 30 MEQ: 391 TABLET, EFFERVESCENT ORAL at 09:04

## 2025-01-01 RX ADMIN — APIXABAN 5 MG: 5 TABLET, FILM COATED ORAL at 02:04

## 2025-01-01 RX ADMIN — POTASSIUM CHLORIDE 30 MEQ: 750 CAPSULE, EXTENDED RELEASE ORAL at 08:04

## 2025-01-01 RX ADMIN — SODIUM CHLORIDE: 9 INJECTION, SOLUTION INTRAVENOUS at 09:04

## 2025-01-01 RX ADMIN — DEXTROSE MONOHYDRATE: 5 INJECTION INTRAVENOUS at 06:04

## 2025-01-01 RX ADMIN — Medication 100 MCG/HR: at 10:04

## 2025-01-01 RX ADMIN — HYDRALAZINE HYDROCHLORIDE 10 MG: 10 TABLET ORAL at 03:04

## 2025-01-01 RX ADMIN — METOPROLOL TARTRATE 25 MG: 25 TABLET, FILM COATED ORAL at 09:04

## 2025-01-01 RX ADMIN — PIPERACILLIN SODIUM AND TAZOBACTAM SODIUM 4.5 G: 4; .5 INJECTION, POWDER, FOR SOLUTION INTRAVENOUS at 04:04

## 2025-01-01 RX ADMIN — HYDRALAZINE HYDROCHLORIDE 10 MG: 10 TABLET ORAL at 07:04

## 2025-01-01 RX ADMIN — ACETAMINOPHEN 650 MG: 325 TABLET ORAL at 09:04

## 2025-01-01 RX ADMIN — POTASSIUM CHLORIDE 40 MEQ: 1500 TABLET, EXTENDED RELEASE ORAL at 08:04

## 2025-01-01 RX ADMIN — HYDROXYZINE HYDROCHLORIDE 25 MG: 25 TABLET, FILM COATED ORAL at 09:04

## 2025-01-01 RX ADMIN — SENNOSIDES 8.6 MG: 8.6 TABLET, FILM COATED ORAL at 09:04

## 2025-01-01 RX ADMIN — ATROPINE SULFATE 1 MG: 0.1 INJECTION INTRAVENOUS at 02:04

## 2025-01-01 RX ADMIN — BUMETANIDE 1 MG: 1 TABLET ORAL at 10:04

## 2025-01-01 RX ADMIN — NOREPINEPHRINE BITARTRATE 0.02 MCG/KG/MIN: 0.02 INJECTION, SOLUTION INTRAVENOUS at 08:04

## 2025-01-01 RX ADMIN — FUROSEMIDE 40 MG: 10 INJECTION, SOLUTION INTRAVENOUS at 12:04

## 2025-01-01 RX ADMIN — FENTANYL CITRATE 50 MCG: 50 INJECTION INTRAMUSCULAR; INTRAVENOUS at 05:04

## 2025-01-01 RX ADMIN — CALCIUM CHLORIDE 10 ML: 100 INJECTION, SOLUTION INTRAVENOUS at 02:04

## 2025-01-01 RX ADMIN — ONDANSETRON 8 MG: 8 TABLET, ORALLY DISINTEGRATING ORAL at 09:04

## 2025-01-01 RX ADMIN — HYDROXYZINE HYDROCHLORIDE 25 MG: 25 TABLET, FILM COATED ORAL at 12:04

## 2025-01-01 RX ADMIN — BUMETANIDE 1 MG: 1 TABLET ORAL at 09:04

## 2025-01-01 RX ADMIN — DAPTOMYCIN 825 MG: 350 INJECTION, POWDER, LYOPHILIZED, FOR SOLUTION INTRAVENOUS at 04:04

## 2025-01-01 RX ADMIN — ACETAMINOPHEN 650 MG: 325 TABLET ORAL at 03:04

## 2025-01-01 RX ADMIN — ALBUTEROL SULFATE 5 MG: 2.5 SOLUTION RESPIRATORY (INHALATION) at 04:04

## 2025-01-03 ENCOUNTER — PATIENT MESSAGE (OUTPATIENT)
Dept: PRIMARY CARE CLINIC | Facility: CLINIC | Age: 80
End: 2025-01-03
Payer: MEDICARE

## 2025-01-03 RX ORDER — POTASSIUM CHLORIDE 750 MG/1
20 CAPSULE, EXTENDED RELEASE ORAL DAILY
Qty: 180 CAPSULE | Refills: 2 | Status: SHIPPED | OUTPATIENT
Start: 2025-01-03

## 2025-01-07 ENCOUNTER — TELEPHONE (OUTPATIENT)
Dept: PALLIATIVE MEDICINE | Facility: CLINIC | Age: 80
End: 2025-01-07
Payer: MEDICARE

## 2025-01-07 NOTE — TELEPHONE ENCOUNTER
Spoke to Marina and she stated due to the weather she did not want to take Mrs. Barajas out and wanted to know if it would be ok to turn the appt to a virtual appt. I informed Marina that the appt was turned into a virtual appt and that Dr. Velasquez would be reaching out through the portal     ----- Message from [employer] sent at 1/7/2025  9:35 AM CST -----  .Type:  Needs Medical Advice    Who Called: pt daughter Sirisha    Would the patient rather a call back or a response via [facility]? Call back  Best Call Back Number:   Additional Information:     Sirisha would like a call back to ask if pt appt can be changed to virtual tomorrow

## 2025-01-08 ENCOUNTER — OFFICE VISIT (OUTPATIENT)
Dept: PALLIATIVE MEDICINE | Facility: CLINIC | Age: 80
End: 2025-01-08
Payer: MEDICARE

## 2025-01-08 DIAGNOSIS — R54 FRAILTY SYNDROME IN GERIATRIC PATIENT: ICD-10-CM

## 2025-01-08 DIAGNOSIS — J43.2 CENTRILOBULAR EMPHYSEMA: Primary | ICD-10-CM

## 2025-01-08 DIAGNOSIS — Z51.5 PALLIATIVE CARE ENCOUNTER: ICD-10-CM

## 2025-01-08 DIAGNOSIS — J96.11 CHRONIC RESPIRATORY FAILURE WITH HYPOXIA: Chronic | ICD-10-CM

## 2025-01-08 NOTE — PROGRESS NOTES
Palliative Medicine Clinic Note - [facility]    The patient location is: [place]  The chief complaint leading to consultation is: debility after prolonged hospitalization    Visit type: audiovisual    Face to Face time with patient: 25  55 minutes of total time spent on the encounter, which includes face to face time and non-face to face time preparing to see the patient (eg, review of tests), Obtaining and/or reviewing separately obtained history, Documenting clinical information in the electronic or other health record, Independently interpreting results (not separately reported) and communicating results to the patient/family/caregiver, or Care coordination (not separately reported).     Each patient to whom he or she provides medical services by telemedicine is:  (1) informed of the relationship between the physician and patient and the respective role of any other health care provider with respect to management of the patient; and (2) notified that he or she may decline to receive medical services by telemedicine and may withdraw from such care at any time.    Consult Originally Requested By: No ref. provider found    Primary Care Physician:   Isela Langston MD    Reason for Follow-Up: Checking in after completion of home health services, continued slow improvement    ASSESSMENT/PLAN:     Ms. Misa Barajas is a 79 year old woman with relevant history of CKDIII, kidney stones, COPD on 2L home oxygen, HFpEF, pAF on apixaban, HTN, HLD, tobacco use presenting to palliative care clinic for management of symptoms and advance care planning. Also with remote hx of breast cancer s/p definitive chemo with resulting polyneuropathy.     Just prior to our first visit, Kaitlynn had a difficult hospitalization for gallstone pancreatitis treated by ERCP with sphincterotomy, complicated by Pasteurella bacteremia requiring second hospitalization. She completed a stay in SNF and returned home to her house where her  daughters had moved in to help her. She had unfortunately experienced significant loss of function due ot this severe insult to her body.     She has now been discharged from home PT/OT and home health, and reports that she feels significantly stronger than she was a few months back when we first met. Her pulmonologist has recommended that now she move on to pulmonary rehabilitation, which is wonderful news. She has a lower symptom burden now that previously, is sleeping, eating, and moving well with her walker, no falls. She reports that she does become short of breath with exertion, but that she is encouraged by her slow but steady improvement. Her daughters moved back to their own homes in early December and she has been living on her own since then with no issues.    They have established her with the [facility] for primary care, which they are very happy with. They request a consult for home palliative services to see if they can continue to have a nurse check in every few weeks on how she is doing now that she is not receiving home health. I am hopeful that she will continue to experience significant improvement with pulmonary rehab and shared that I do not think she needs scheduled visits back with me, but that I am always an available resource to her if needed.     Plan/Recommendations:  #Generalized weakness  #Breathlessness - Improving significantly. Pulmonary is working on getting easier to transport oxygen supply (concentrator vs small bottles), as she is now on 2LNC all the time. She is coping well with this change in her life, and is in good spirits. Completing all ADLs as well as home tasks, cooking for herself. Discharged from PT/OT/, now pursuing pulmonary rehab!    #Nasal congestion - discussed that in addition to occasional Flonase use, she can use nasal saline spray to help with days she feels more short-winded due to weather change. Advised against use of Afrin or  azelastine.    Advance Care Planning   Advance Directives:   Living Will: No    [place]: No    Do Not Resuscitate Status: Yes    Medical Power of : Yes (family completed, are bringing a copy to PCP today)      Decision Making:  Patient answered questions and Family answered questions  Goals of Care: What is most important right now is to focus on spending time at home, remaining as independent as possible, improvement in condition but with limits to invasive therapies. Accordingly, we have decided that the best plan to meet the patient's goals includes pursuing pulmonary rehabilitation and home palliative services.    Follow up: With me PRN, will hope to get her set up with home palliative visits.    Plan discussed with patient and daughters via video visit today.    SUBJECTIVE:     Review of Symptoms      Symptom Assessment (ESAS 0-10 Scale)  Pain:  0  Dyspnea:  5  Anxiety:  0  Nausea:  0  Depression:  0  Anorexia:  0  Fatigue:  0  Insomnia:  0  Restlessness:  0  Agitation:  0         Performance Status:  70    Living Arrangements:  Lives alone (with cat, Romeo)    Psychosocial/Cultural:   See Palliative Psychosocial Note: No  Social Issues Identified: Coping deficit pt/family  Bereavement Risk: No  Caregiver Needs Discussed. Caregiver Distress: No: Caregiver support and community resources discussed.    Cultural: no needs identified  **Primary  to Follow**  Palliative Care  Consult: No     Time-Based Charting:  Yes  Chart Review: 30 minutes  Face to Face: 25 minutes    Total Time Spent: 55 minutes      Active Diagnoses & Disease History:  Chronic hypoxic respiratory failure - on 2LNC home oxygen  COPD  Diastolic HF  Orthostatic hypotension  Atrial flutter on anticoagulation  Nephrolithiasis  CKD3 - c/b secondary hyperparathyroidism  Osteoporosis  Gallstone pancreatitis s/p ERCP with sphincterotomy, c/b Pasteurella bacteremia  Bilateral venous stasis edema  Tobacco use  Remote hx  breast cancer s/p chemotherapy with resulting polyneuropathy    Previous experience or exposure to a serious illness: Yes    Medications:    Current Outpatient Medications:     acetaminophen (TYLENOL) 500 MG tablet, , Disp: , Rfl:     albuterol sulfate (PROAIR RESPICLICK) 90 mcg/actuation inhaler, 2 puffs as needed Inhalation every 6 hrs 30 days, Disp: 1 each, Rfl: 0    aspirin (ECOTRIN) 81 MG EC tablet, 1 tablet Orally Once a day 30 days, Disp: 30 tablet, Rfl: 0    BD POSIFLUSH NORMAL SALINE 0.9 injection, , Disp: , Rfl:     biotin 1 mg Cap, 1 capsule Orally Once a day 30 days, Disp: 30 capsule, Rfl: 0    budesonide-glycopyr-formoterol (BREZTRI AEROSPHERE) 160-9-4.8 mcg/actuation HFAA, Inhale 2 puffs into the lungs 2 (two) times daily., Disp: 32.1 g, Rfl: 3    busPIRone (BUSPAR) 5 MG Tab, 1 tablet Orally once daily as needed 30 days, Disp: 30 tablet, Rfl: 0    ferrous sulfate (FEOSOL) 325 mg (65 mg iron) Tab tablet, Take 1 tablet (325 mg total) by mouth every other day., Disp: 45 tablet, Rfl: 2    fludrocortisone (FLORINEF) 0.1 mg Tab, Take 2 tablets (0.2 mg total) by mouth once daily., Disp: 180 tablet, Rfl: 3    fluticasone propionate (FLONASE) 50 mcg/actuation nasal spray, 1 spray by Each Nostril route daily as needed for Rhinitis or Allergies., Disp: , Rfl:     furosemide (LASIX) 40 MG tablet, Take 1 tablet (40 mg total) by mouth once daily., Disp: 90 tablet, Rfl: 3    inhalation spacing device, Use as directed for inhalation., Disp: 1 each, Rfl: 0    levalbuterol (XOPENEX) 1.25 mg/3 mL nebulizer solution, Take 1 ampule by nebulization every 8 (eight) hours as needed for Wheezing or Shortness of Breath., Disp: , Rfl:     magnesium hydroxide 400 mg/5 ml (MILK OF MAGNESIA) 400 mg/5 mL Susp, 30 ml as neded Orally once daily, Disp: , Rfl:     meclizine (ANTIVERT) 12.5 mg tablet, Take 1 tablet (12.5 mg total) by mouth Daily., Disp: 90 tablet, Rfl: 3    metoprolol succinate (TOPROL-XL) 25 MG 24 hr tablet, Take ½  tablet (12.5 mg total) by mouth once daily., Disp: 45 tablet, Rfl: 3    ondansetron (ZOFRAN) 4 MG tablet, 1 tablet as needed for nausea Orally every 8 hours 7 days, Disp: 21 tablet, Rfl: 0    polyethylene glycol (MIRALAX) 17 gram/dose powder, , Disp: , Rfl:     potassium chloride (MICRO-K) 10 MEQ CpSR, Take 2 capsules (20 mEq total) by mouth once daily., Disp: 180 capsule, Rfl: 2    senna-docusate 8.6-50 mg (PERICOLACE) 8.6-50 mg per tablet, 1 tablet Orally Twice a day, Disp: , Rfl:     traZODone (DESYREL) 50 MG tablet, Take 0.5 tablets (25 mg total) by mouth nightly as needed for Insomnia (Anxiety or insomnia)., Disp: , Rfl:     triamcinolone acetonide 0.1% (KENALOG) 0.1 % cream, Apply externally to affected area twice a day until clear, Disp: 80 g, Rfl: 1    vitamin D (VITAMIN D3) 1000 units Tab, Take 2 tablets (2,000 Units total) by mouth once daily., Disp: 180 tablet, Rfl: 3    Past Medical History:   Diagnosis Date    Acute biliary pancreatitis 07/27/2024    Anemia 07/12/2024    Aortic atherosclerosis     Arthritis     knee joint pain    Asthma-COPD overlap syndrome 08/22/2022    home o2    Breast cancer 2002    left breast & lymph nodes-s/p sx with chemo    Cataracts, bilateral     Chronic diastolic heart failure 12/24/2019    Chronic kidney disease, stage 3     Class 1 obesity due to excess calories with serious comorbidity and body mass index (BMI) of 30.0 to 30.9 in adult 05/16/2024    History of chemotherapy     last treatment 12/2002 (had 8 treatments)    Hyperlipidemia 11/20/2016    Hypertension     Lymphedema of both lower extremities 01/25/2022    Nephrolithiasis 06/02/2014    Osteoporosis     Paroxysmal atrial fibrillation 06/07/2021    Renal osteodystrophy 01/14/2016    Venous stasis dermatitis of both lower extremities 01/25/2022    Vitamin D insufficiency      Past Surgical History:   Procedure Laterality Date    ABLATION, ATRIAL FLUTTER, TYPICAL N/A 6/17/2024    Procedure: Ablation, Atrial  Shira, Typical;  Surgeon: Taz Church MD;  Location: [facility] EP LAB;  Service: Cardiology;  Laterality: N/A;  AFL, RFA, LORENE, MAKAYLA (cx if SR), LEANNE miller, 3 Prep    BREAST BIOPSY Left 2002    core bx, +    BREAST BIOPSY Right 2018    core    BREAST BIOPSY Right 2019    BREAST LUMPECTOMY Left 2002    CYSTOSCOPY  4/28/2022    Procedure: CYSTOSCOPY;  Surgeon: Vik Ware MD;  Location: [facility] OR 1ST FLR;  Service: Urology;;    CYSTOSCOPY  5/30/2022    Procedure: CYSTOSCOPY;  Surgeon: Bonnie Knutson MD;  Location: [facility] OR [facility] FLR;  Service: Urology;;    ECHOCARDIOGRAM,TRANSESOPHAGEAL N/A 6/17/2024    Procedure: Transesophageal echo (MAKAYLA) intra-procedure log documentation;  Surgeon: Nestor Martinez MD;  Location: [facility] EP LAB;  Service: Cardiology;  Laterality: N/A;    ERCP N/A 7/30/2024    Procedure: ERCP (ENDOSCOPIC RETROGRADE CHOLANGIOPANCREATOGRAPHY);  Surgeon: Sierra Cotto MD;  Location: [facility] ENDO (2ND FLR);  Service: Endoscopy;  Laterality: N/A;    LASER LITHOTRIPSY  5/30/2022    Procedure: LITHOTRIPSY, USING LASER;  Surgeon: Bonnie Knutson MD;  [facility];  Service: Urology;;    LITHOTRIPSY      MASTECTOMY Left 06/2002    left-& lymph node dissection    REPLACEMENT OF STENT Right 5/30/2022    Procedure: REPLACEMENT, STENT;  Surgeon: Bonnie Knutson MD;  [facility];  Service: Urology;  Laterality: Right;    RETROGRADE PYELOGRAPHY Right 4/28/2022    Procedure: PYELOGRAM, RETROGRADE;  Surgeon: Vik Ware MD;  Location: [facility] OR 1ST FLR;  Service: Urology;  Laterality: Right;    ROBOT-ASSISTED LAPAROSCOPIC PARTIAL NEPHRECTOMY USING DA JESSÚ XI Right 3/11/2021    Procedure: XI ROBOTIC NEPHRECTOMY, PARTIAL;  Surgeon: Taz Ortega MD;  Location: [facility] OR 2ND FLR;  Service: Urology;  Laterality: Right;  4hr/ gen with regional  [facility] confirmation:  [phone] for 11:15am case NC    URETEROSCOPIC REMOVAL OF URETERIC CALCULUS Right 5/30/2022     Procedure: REMOVAL, CALCULUS, URETER, URETEROSCOPIC;  Surgeon: Bonnie Knutson MD;  Location: [facility] OR [address] [place] [address];  Service: Urology;  Laterality: Right;    ureteroscopy Bilateral     6.14    URETEROSCOPY Right 5/30/2022    Procedure: URETEROSCOPY;  Surgeon: Bonnie Knutson MD;  Location: [facility] OR [address] [place] [address];  Service: Urology;  Laterality: Right;     Family History   Problem Relation Name Age of Onset    Bone cancer Mother Bibi     Breast cancer Mother Bibi     Arthritis Mother Bibi         Breast Cancer    Breast cancer Sister      Cancer Father Maxwell         Asbestos cancer    No Known Problems Brother      Fibroids Daughter      Crohn's disease Daughter      No Known Problems Daughter      Arthritis Sister          Breast Cancer    Anesthesia problems Neg Hx      Glaucoma Neg Hx       Review of patient's allergies indicates:   Allergen Reactions    Adhesive tape-silicones     Adhesive Rash     Pt states she removed a LATEX bandaid and the skin beneath was swollen and red. No other latex causes a reaction.       OBJECTIVE:     Vitals: not obtained due to nature of video visit  Physical Exam  Constitutional:       General: She is not in acute distress.     Appearance: She is ill-appearing.      Comments: Well groomed elderly woman sitting up at table for visit.   HENT:      Head: Normocephalic and atraumatic.      Comments: NC in place.     Nose: Nose normal. No congestion.      Mouth/Throat:      Mouth: Mucous membranes are moist.   Eyes:      General: No scleral icterus.     Extraocular Movements: Extraocular movements intact.   Pulmonary:      Effort: Pulmonary effort is normal. No respiratory distress.   Neurological:      Mental Status: She is alert.      Comments: Conversant and pleasant, can discuss complex medical history and implications of future decisions.   Psychiatric:         Mood and Affect: Mood normal.         Behavior: Behavior normal.         Labs:  CBC:   WBC   Date Value Ref  Range Status   09/02/2024 9.56 3.90 - 12.70 K/uL Final       Hemoglobin   Date Value Ref Range Status   09/02/2024 9.0 (L) 12.0 - 16.0 g/dL Final       POC Hematocrit   Date Value Ref Range Status   07/26/2024 33 (L) 36 - 54 %PCV Final     Hematocrit   Date Value Ref Range Status   09/02/2024 29.3 (L) 37.0 - 48.5 % Final       MCV   Date Value Ref Range Status   09/02/2024 93 82 - 98 fL Final       Platelets   Date Value Ref Range Status   09/02/2024 251 150 - 450 K/uL Final       Lab Results   Component Value Date    CREATININE 1.4 12/17/2024    BUN 13 12/17/2024     12/17/2024    K 3.2 (L) 12/17/2024     12/17/2024    CO2 26 12/17/2024        LFT:   Lab Results   Component Value Date    AST 27 12/17/2024    ALKPHOS 111 12/17/2024    BILITOT 0.5 12/17/2024       Albumin:   Albumin   Date Value Ref Range Status   12/17/2024 2.7 (L) 3.5 - 5.2 g/dL Final     Protein:   Total Protein   Date Value Ref Range Status   12/17/2024 6.4 6.0 - 8.4 g/dL Final       Radiology:  VAS US Venous Arm Left  Indication  ========    Lt. Arm Swelling    Upper Extremity Veins  =================    Rt subclavian V:   complete compression, complete color fill  Lt int jugular V:  complete compression, complete color fill  Lt subclavian V:   complete compression, complete color fill  Lt brachiocephalic V:  complete compression, complete color fill  Lt axillary V: complete compression, complete color fill  Lt brachial V: complete compression, complete color fill  Lt prox brachial V:    complete compression, complete color fill,  Lt mid brachial V: complete compression, complete color fill  Lt distal brachial V:  complete compression, complete color fill  Lt radial vein details:    complete compression, complete color fill  Lt ulnar V:    complete compression, complete color fill  Lt prox basilic V: complete compression, complete color fill  Lt mid basilic V:  complete compression, complete color fill  Lt distal basilic V:    complete compression, complete color fill  Lt prox cephalic V:    complete compression, complete color fill  Lt mid cephalic V: complete compression, complete color fill  Lt distal cephalic V:  complete compression, complete color fill    Impression  =========    Left Arm: Color flow duplex of the left upper extremity reveals patent and compressible veins. There is no evidence of a deep venous  thrombosis in the deep or superficial veins.    Right Arm: Subclavian V patent.    DATE OF SERVICE: 08/07/2024                                                      Sonographer: PASCUAL RIVERS [facility]  Electronically Signed by: Akil Temple II at 08/07/2024-17:22       Leti Velasquez MD  Hospice and Palliative Medicine  [facility]

## 2025-01-17 ENCOUNTER — TELEPHONE (OUTPATIENT)
Dept: NEPHROLOGY | Facility: CLINIC | Age: 80
End: 2025-01-17
Payer: MEDICARE

## 2025-01-21 ENCOUNTER — TELEPHONE (OUTPATIENT)
Dept: PRIMARY CARE CLINIC | Facility: CLINIC | Age: 80
End: 2025-01-21
Payer: MEDICARE

## 2025-01-29 ENCOUNTER — HOSPITAL ENCOUNTER (INPATIENT)
Facility: HOSPITAL | Age: 80
LOS: 5 days | Discharge: HOME | DRG: 291 | End: 2025-02-03
Attending: EMERGENCY MEDICINE | Admitting: HOSPITALIST
Payer: MEDICARE

## 2025-01-29 ENCOUNTER — OFFICE VISIT (OUTPATIENT)
Dept: PULMONOLOGY | Facility: CLINIC | Age: 80
End: 2025-01-29
Payer: MEDICARE

## 2025-01-29 ENCOUNTER — PATIENT MESSAGE (OUTPATIENT)
Dept: PULMONOLOGY | Facility: CLINIC | Age: 80
End: 2025-01-29

## 2025-01-29 DIAGNOSIS — I50.9 ACUTE EXACERBATION OF CHF (CONGESTIVE HEART FAILURE): ICD-10-CM

## 2025-01-29 DIAGNOSIS — I50.9 HEART FAILURE: ICD-10-CM

## 2025-01-29 DIAGNOSIS — J96.11 CHRONIC RESPIRATORY FAILURE WITH HYPOXIA: Chronic | ICD-10-CM

## 2025-01-29 DIAGNOSIS — J43.2 CENTRILOBULAR EMPHYSEMA: Primary | ICD-10-CM

## 2025-01-29 DIAGNOSIS — R06.02 SHORTNESS OF BREATH: ICD-10-CM

## 2025-01-29 LAB
ALBUMIN SERPL BCP-MCNC: 3 G/DL (ref 3.5–5.2)
ALLENS TEST: ABNORMAL
ALP SERPL-CCNC: 129 U/L (ref 40–150)
ALT SERPL W/O P-5'-P-CCNC: 21 U/L (ref 10–44)
ANION GAP SERPL CALC-SCNC: 13 MMOL/L (ref 8–16)
AST SERPL-CCNC: 38 U/L (ref 10–40)
BASOPHILS # BLD AUTO: 0.04 K/UL (ref 0–0.2)
BASOPHILS NFR BLD: 0.6 % (ref 0–1.9)
BILIRUB SERPL-MCNC: 0.7 MG/DL (ref 0.1–1)
BNP SERPL-MCNC: 2548 PG/ML (ref 0–99)
BUN SERPL-MCNC: 16 MG/DL (ref 8–23)
CALCIUM SERPL-MCNC: 10.5 MG/DL (ref 8.7–10.5)
CHLORIDE SERPL-SCNC: 109 MMOL/L (ref 95–110)
CO2 SERPL-SCNC: 21 MMOL/L (ref 23–29)
CREAT SERPL-MCNC: 1.2 MG/DL (ref 0.5–1.4)
DELSYS: ABNORMAL
DIFFERENTIAL METHOD BLD: ABNORMAL
EOSINOPHIL # BLD AUTO: 0 K/UL (ref 0–0.5)
EOSINOPHIL NFR BLD: 0.3 % (ref 0–8)
ERYTHROCYTE [DISTWIDTH] IN BLOOD BY AUTOMATED COUNT: 19.4 % (ref 11.5–14.5)
EST. GFR  (NO RACE VARIABLE): 45.5 ML/MIN/1.73 M^2
GLUCOSE SERPL-MCNC: 140 MG/DL (ref 70–110)
HCO3 UR-SCNC: 24.9 MMOL/L (ref 24–28)
HCT VFR BLD AUTO: 38.4 % (ref 37–48.5)
HCV AB SERPL QL IA: NORMAL
HGB BLD-MCNC: 12.2 G/DL (ref 12–16)
HIV 1+2 AB+HIV1 P24 AG SERPL QL IA: NORMAL
IMM GRANULOCYTES # BLD AUTO: 0.03 K/UL (ref 0–0.04)
IMM GRANULOCYTES NFR BLD AUTO: 0.4 % (ref 0–0.5)
INFLUENZA A, MOLECULAR: NORMAL
INFLUENZA B, MOLECULAR: NORMAL
LYMPHOCYTES # BLD AUTO: 0.7 K/UL (ref 1–4.8)
LYMPHOCYTES NFR BLD: 9.6 % (ref 18–48)
MCH RBC QN AUTO: 26.8 PG (ref 27–31)
MCHC RBC AUTO-ENTMCNC: 31.8 G/DL (ref 32–36)
MCV RBC AUTO: 84 FL (ref 82–98)
MODE: ABNORMAL
MONOCYTES # BLD AUTO: 0.1 K/UL (ref 0.3–1)
MONOCYTES NFR BLD: 1.3 % (ref 4–15)
NEUTROPHILS # BLD AUTO: 5.9 K/UL (ref 1.8–7.7)
NEUTROPHILS NFR BLD: 87.8 % (ref 38–73)
NRBC BLD-RTO: 0 /100 WBC
PCO2 BLDA: 30.3 MMHG (ref 35–45)
PH SMN: 7.52 [PH] (ref 7.35–7.45)
PLATELET # BLD AUTO: 213 K/UL (ref 150–450)
PMV BLD AUTO: 9.2 FL (ref 9.2–12.9)
PO2 BLDA: 45 MMHG (ref 40–60)
POC BE: 2 MMOL/L
POC SATURATED O2: 86 % (ref 95–100)
POC TCO2: 26 MMOL/L (ref 24–29)
POTASSIUM SERPL-SCNC: 3.6 MMOL/L (ref 3.5–5.1)
PROT SERPL-MCNC: 7.4 G/DL (ref 6–8.4)
RBC # BLD AUTO: 4.56 M/UL (ref 4–5.4)
SAMPLE: ABNORMAL
SARS-COV-2 RDRP RESP QL NAA+PROBE: NEGATIVE
SITE: ABNORMAL
SODIUM SERPL-SCNC: 143 MMOL/L (ref 136–145)
SPECIMEN SOURCE: NORMAL
TROPONIN I SERPL DL<=0.01 NG/ML-MCNC: 19 NG/L (ref 0–14)
WBC # BLD AUTO: 6.77 K/UL (ref 3.9–12.7)

## 2025-01-29 PROCEDURE — 87635 SARS-COV-2 COVID-19 AMP PRB: CPT | Performed by: EMERGENCY MEDICINE

## 2025-01-29 PROCEDURE — 93005 ELECTROCARDIOGRAM TRACING: CPT

## 2025-01-29 PROCEDURE — 82803 BLOOD GASES ANY COMBINATION: CPT

## 2025-01-29 PROCEDURE — 93010 ELECTROCARDIOGRAM REPORT: CPT | Mod: ,,, | Performed by: INTERNAL MEDICINE

## 2025-01-29 PROCEDURE — 63600175 PHARM REV CODE 636 W HCPCS: Performed by: EMERGENCY MEDICINE

## 2025-01-29 PROCEDURE — 25000242 PHARM REV CODE 250 ALT 637 W/ HCPCS: Performed by: EMERGENCY MEDICINE

## 2025-01-29 PROCEDURE — 85025 COMPLETE CBC W/AUTO DIFF WBC: CPT | Performed by: EMERGENCY MEDICINE

## 2025-01-29 PROCEDURE — 87502 INFLUENZA DNA AMP PROBE: CPT | Performed by: EMERGENCY MEDICINE

## 2025-01-29 PROCEDURE — 12000002 HC ACUTE/MED SURGE SEMI-PRIVATE ROOM

## 2025-01-29 PROCEDURE — 94761 N-INVAS EAR/PLS OXIMETRY MLT: CPT | Mod: XB

## 2025-01-29 PROCEDURE — 80053 COMPREHEN METABOLIC PANEL: CPT | Performed by: EMERGENCY MEDICINE

## 2025-01-29 PROCEDURE — 86803 HEPATITIS C AB TEST: CPT | Performed by: PHYSICIAN ASSISTANT

## 2025-01-29 PROCEDURE — 84484 ASSAY OF TROPONIN QUANT: CPT | Performed by: EMERGENCY MEDICINE

## 2025-01-29 PROCEDURE — 99900035 HC TECH TIME PER 15 MIN (STAT)

## 2025-01-29 PROCEDURE — 87389 HIV-1 AG W/HIV-1&-2 AB AG IA: CPT | Performed by: PHYSICIAN ASSISTANT

## 2025-01-29 PROCEDURE — 27000221 HC OXYGEN, UP TO 24 HOURS

## 2025-01-29 PROCEDURE — 83880 ASSAY OF NATRIURETIC PEPTIDE: CPT | Performed by: EMERGENCY MEDICINE

## 2025-01-29 PROCEDURE — 94640 AIRWAY INHALATION TREATMENT: CPT

## 2025-01-29 PROCEDURE — 96374 THER/PROPH/DIAG INJ IV PUSH: CPT

## 2025-01-29 RX ORDER — BUSPIRONE HYDROCHLORIDE 5 MG/1
5 TABLET ORAL DAILY
Status: DISCONTINUED | OUTPATIENT
Start: 2025-01-30 | End: 2025-02-03 | Stop reason: HOSPADM

## 2025-01-29 RX ORDER — ASPIRIN 81 MG/1
81 TABLET ORAL DAILY
Status: DISCONTINUED | OUTPATIENT
Start: 2025-01-30 | End: 2025-02-03 | Stop reason: HOSPADM

## 2025-01-29 RX ORDER — PREDNISONE 20 MG/1
40 TABLET ORAL DAILY
Qty: 10 TABLET | Refills: 0 | Status: ON HOLD | OUTPATIENT
Start: 2025-01-29 | End: 2025-02-03 | Stop reason: HOSPADM

## 2025-01-29 RX ORDER — LEVALBUTEROL 1.25 MG/.5ML
1.25 SOLUTION, CONCENTRATE RESPIRATORY (INHALATION) EVERY 8 HOURS PRN
Status: DISCONTINUED | OUTPATIENT
Start: 2025-01-29 | End: 2025-02-02

## 2025-01-29 RX ORDER — SODIUM CHLORIDE 0.9 % (FLUSH) 0.9 %
10 SYRINGE (ML) INJECTION
Status: DISCONTINUED | OUTPATIENT
Start: 2025-01-30 | End: 2025-02-03 | Stop reason: HOSPADM

## 2025-01-29 RX ORDER — LEVALBUTEROL INHALATION SOLUTION 0.63 MG/3ML
0.31 SOLUTION RESPIRATORY (INHALATION)
Status: DISCONTINUED | OUTPATIENT
Start: 2025-01-29 | End: 2025-01-29

## 2025-01-29 RX ORDER — DOXYCYCLINE HYCLATE 100 MG
100 TABLET ORAL 2 TIMES DAILY
Qty: 20 TABLET | Refills: 0 | Status: ON HOLD | OUTPATIENT
Start: 2025-01-29 | End: 2025-02-03 | Stop reason: HOSPADM

## 2025-01-29 RX ORDER — LEVALBUTEROL 1.25 MG/.5ML
1.25 SOLUTION, CONCENTRATE RESPIRATORY (INHALATION) EVERY 8 HOURS
Status: DISCONTINUED | OUTPATIENT
Start: 2025-01-30 | End: 2025-02-02

## 2025-01-29 RX ORDER — LEVALBUTEROL INHALATION SOLUTION 0.63 MG/3ML
0.63 SOLUTION RESPIRATORY (INHALATION)
Status: COMPLETED | OUTPATIENT
Start: 2025-01-29 | End: 2025-01-29

## 2025-01-29 RX ORDER — DOXYCYCLINE HYCLATE 100 MG
100 TABLET ORAL 2 TIMES DAILY
Status: DISCONTINUED | OUTPATIENT
Start: 2025-01-30 | End: 2025-02-02

## 2025-01-29 RX ORDER — FUROSEMIDE 10 MG/ML
40 INJECTION INTRAMUSCULAR; INTRAVENOUS
Status: COMPLETED | OUTPATIENT
Start: 2025-01-29 | End: 2025-01-29

## 2025-01-29 RX ORDER — POLYETHYLENE GLYCOL 3350 17 G/17G
17 POWDER, FOR SOLUTION ORAL 3 TIMES DAILY PRN
Status: DISCONTINUED | OUTPATIENT
Start: 2025-01-30 | End: 2025-02-03 | Stop reason: HOSPADM

## 2025-01-29 RX ORDER — FUROSEMIDE 10 MG/ML
40 INJECTION INTRAMUSCULAR; INTRAVENOUS EVERY 12 HOURS
Status: DISCONTINUED | OUTPATIENT
Start: 2025-01-30 | End: 2025-01-30

## 2025-01-29 RX ORDER — ONDANSETRON 8 MG/1
8 TABLET, ORALLY DISINTEGRATING ORAL EVERY 8 HOURS PRN
Status: DISCONTINUED | OUTPATIENT
Start: 2025-01-30 | End: 2025-01-29

## 2025-01-29 RX ORDER — ENOXAPARIN SODIUM 100 MG/ML
40 INJECTION SUBCUTANEOUS EVERY 24 HOURS
Status: DISCONTINUED | OUTPATIENT
Start: 2025-01-30 | End: 2025-02-03 | Stop reason: HOSPADM

## 2025-01-29 RX ORDER — CALCIUM CARBONATE 200(500)MG
1000 TABLET,CHEWABLE ORAL 3 TIMES DAILY PRN
Status: DISCONTINUED | OUTPATIENT
Start: 2025-01-30 | End: 2025-02-03 | Stop reason: HOSPADM

## 2025-01-29 RX ORDER — ACETAMINOPHEN 325 MG/1
650 TABLET ORAL EVERY 8 HOURS PRN
Status: DISCONTINUED | OUTPATIENT
Start: 2025-01-30 | End: 2025-02-03 | Stop reason: HOSPADM

## 2025-01-29 RX ORDER — BENZONATATE 100 MG/1
100 CAPSULE ORAL 3 TIMES DAILY PRN
Status: DISCONTINUED | OUTPATIENT
Start: 2025-01-30 | End: 2025-02-03 | Stop reason: HOSPADM

## 2025-01-29 RX ADMIN — LEVALBUTEROL HYDROCHLORIDE 0.63 MG: 0.63 SOLUTION RESPIRATORY (INHALATION) at 11:01

## 2025-01-29 RX ADMIN — FUROSEMIDE 40 MG: 10 INJECTION, SOLUTION INTRAVENOUS at 10:01

## 2025-01-29 NOTE — ASSESSMENT & PLAN NOTE
Significant chronic condition to be followed longitudinally with following long term treatment plan.     Breztri inhaler BID with spacer; prior to exacerbation had relatively good control.  PRN albuterol use. Getting Xopenex nebs at inpatient rehab - discussed caution with Afib history.  Discussed no role for chronic steroids in COPD given increased mortality.    Pulmonary Rehab once no longer getting rehab- will discuss after next inperson visit.    Treat severe exacerbation with Prednisone burst and Doxycycline. No sick contacts so flu less likely. ER warning given julius with her increased O2 needs. Patient and daughter understand with any worsening, she needs to present to emergency room. Currently we have made the following hospitalization decision: attempt to avoid hospitalization and attempt outpatient treatment with low threshold for admission with failure to improve or worsening..

## 2025-01-29 NOTE — ASSESSMENT & PLAN NOTE
Significant chronic condition to be followed longitudinally with following long term treatment plan.      Related to progression of underlying COPD and CHF. Repeat 6MWD shows persistent O2 needs.  Rx POC. Continue complaince 24/7    -increase O2 to 5 for now but if Sat <90, will require admission

## 2025-01-29 NOTE — PROGRESS NOTES
[facility] Virtual Visit Note    The patient location is: [place]  The chief complaint leading to consultation is: COPD exacerbation  Visit type: Virtual visit with synchronous audio and video  Total time spent with patient: 25  Each patient to whom he or she provides medical services by telemedicine is:  (1) informed of the relationship between the physician and patient and the respective role of any other health care provider with respect to management of the patient; and (2) notified that he or she may decline to receive medical services by telemedicine and may withdraw from such care at any time.      Subjective:       Patient ID: Misa Barajas is a 79 y.o. female.  The patient's last visit with me was on 10/29/2024.     Worsening REED and SOB at rest which is p[rogressing over last 72 hours. + fevers and chills. O2 Sat down to 87 even on O2 increased to 4L (baseline 2L). No cough or mucus. No sore throat. No other URI symptoms. Chronic LE edema not worse from prior. Daughter helps to provide collateral history.  Review of Systems        Objective:   Increased work of breathing noted but no nasal flairing. No severe distress    CBC  Lab Results   Component Value Date    WBC 9.56 09/02/2024    HGB 9.0 (L) 09/02/2024    HCT 29.3 (L) 09/02/2024    MCV 93 09/02/2024     09/02/2024         CMP  Sodium   Date Value Ref Range Status   12/17/2024 141 136 - 145 mmol/L Final     Potassium   Date Value Ref Range Status   12/17/2024 3.2 (L) 3.5 - 5.1 mmol/L Final     Chloride   Date Value Ref Range Status   12/17/2024 105 95 - 110 mmol/L Final     CO2   Date Value Ref Range Status   12/17/2024 26 23 - 29 mmol/L Final     Glucose   Date Value Ref Range Status   12/17/2024 97 70 - 110 mg/dL Final     BUN   Date Value Ref Range Status   12/17/2024 13 8 - 23 mg/dL Final     Creatinine   Date Value Ref Range Status   12/17/2024 1.4 0.5 - 1.4 mg/dL Final     Calcium   Date Value Ref Range Status  "  12/17/2024 9.9 8.7 - 10.5 mg/dL Final     Total Protein   Date Value Ref Range Status   12/17/2024 6.4 6.0 - 8.4 g/dL Final     Albumin   Date Value Ref Range Status   12/17/2024 2.7 (L) 3.5 - 5.2 g/dL Final     Total Bilirubin   Date Value Ref Range Status   12/17/2024 0.5 0.1 - 1.0 mg/dL Final     Comment:     For infants and newborns, interpretation of results should be based  on gestational age, weight and in agreement with clinical  observations.    Premature Infant recommended reference ranges:  Up to 24 hours.............<8.0 mg/dL  Up to 48 hours............<12.0 mg/dL  3-5 days..................<15.0 mg/dL  6-29 days.................<15.0 mg/dL       Alkaline Phosphatase   Date Value Ref Range Status   12/17/2024 111 40 - 150 U/L Final     AST   Date Value Ref Range Status   12/17/2024 27 10 - 40 U/L Final     ALT   Date Value Ref Range Status   12/17/2024 12 10 - 44 U/L Final     Anion Gap   Date Value Ref Range Status   12/17/2024 10 8 - 16 mmol/L Final     eGFR   Date Value Ref Range Status   12/17/2024 37.8 (A) >60 mL/min/1.73 m^2 Final       ABG  POC PH   Date Value Ref Range Status   03/01/2024 7.361 7.35 - 7.45 Final     POC PO2   Date Value Ref Range Status   03/01/2024 15 (LL) 40 - 60 mmHg Final     POC PCO2   Date Value Ref Range Status   03/01/2024 50.7 (H) 35 - 45 mmHg Final           Personal Diagnostic Review  I have personally reviewed the following data and added my own interpretation as below:  none pertinent      12/17/2024     8:52 AM 11/5/2024    10:01 AM 10/29/2024     1:14 PM 9/24/2024     8:15 AM 9/3/2024    10:52 AM 9/3/2024     9:03 AM 9/3/2024     4:23 AM   Pulmonary Function Tests   SpO2 97 % 96 % 94 % 94 % 93 % 95 % 93 %   Height 5' 4" (1.626 m) 5' 4" (1.626 m) 5' 4" (1.626 m)       Weight 68.9 kg (151 lb 14.4 oz) 68.9 kg (152 lb) 68.5 kg (151 lb) 70.5 kg (155 lb 6.8 oz)      BMI (Calculated) 26.1 26.1 25.9             Assessment:       1. Centrilobular emphysema    2. Chronic " respiratory failure with hypoxia        Outpatient Encounter Medications as of 1/29/2025   Medication Sig Dispense Refill    acetaminophen (TYLENOL) 500 MG tablet       albuterol sulfate (PROAIR RESPICLICK) 90 mcg/actuation inhaler 2 puffs as needed Inhalation every 6 hrs 30 days 1 each 0    aspirin (ECOTRIN) 81 MG EC tablet 1 tablet Orally Once a day 30 days 30 tablet 0    BD POSIFLUSH NORMAL SALINE 0.9 injection       biotin 1 mg Cap 1 capsule Orally Once a day 30 days 30 capsule 0    budesonide-glycopyr-formoterol (BREZTRI AEROSPHERE) 160-9-4.8 mcg/actuation HFAA Inhale 2 puffs into the lungs 2 (two) times daily. 32.1 g 3    busPIRone (BUSPAR) 5 MG Tab 1 tablet Orally once daily as needed 30 days 30 tablet 0    doxycycline (VIBRA-TABS) 100 MG tablet Take 1 tablet (100 mg total) by mouth 2 (two) times daily. for 10 days 20 tablet 0    ferrous sulfate (FEOSOL) 325 mg (65 mg iron) Tab tablet Take 1 tablet (325 mg total) by mouth every other day. 45 tablet 2    fludrocortisone (FLORINEF) 0.1 mg Tab Take 2 tablets (0.2 mg total) by mouth once daily. 180 tablet 3    fluticasone propionate (FLONASE) 50 mcg/actuation nasal spray 1 spray by Each Nostril route daily as needed for Rhinitis or Allergies.      furosemide (LASIX) 40 MG tablet Take 1 tablet (40 mg total) by mouth once daily. 90 tablet 3    inhalation spacing device Use as directed for inhalation. 1 each 0    levalbuterol (XOPENEX) 1.25 mg/3 mL nebulizer solution Take 1 ampule by nebulization every 8 (eight) hours as needed for Wheezing or Shortness of Breath.      magnesium hydroxide 400 mg/5 ml (MILK OF MAGNESIA) 400 mg/5 mL Susp 30 ml as neded Orally once daily      meclizine (ANTIVERT) 12.5 mg tablet Take 1 tablet (12.5 mg total) by mouth Daily. 90 tablet 3    metoprolol succinate (TOPROL-XL) 25 MG 24 hr tablet Take ½ tablet (12.5 mg total) by mouth once daily. 45 tablet 3    ondansetron (ZOFRAN) 4 MG tablet 1 tablet as needed for nausea Orally every 8 hours  7 days 21 tablet 0    polyethylene glycol (MIRALAX) 17 gram/dose powder       potassium chloride (MICRO-K) 10 MEQ CpSR Take 2 capsules (20 mEq total) by mouth once daily. 180 capsule 2    predniSONE (DELTASONE) 20 MG tablet Take 2 tablets (40 mg total) by mouth once daily. for 5 days 10 tablet 0    senna-docusate 8.6-50 mg (PERICOLACE) 8.6-50 mg per tablet 1 tablet Orally Twice a day      traZODone (DESYREL) 50 MG tablet Take 0.5 tablets (25 mg total) by mouth nightly as needed for Insomnia (Anxiety or insomnia).      triamcinolone acetonide 0.1% (KENALOG) 0.1 % cream Apply externally to affected area twice a day until clear 80 g 1    vitamin D (VITAMIN D3) 1000 units Tab Take 2 tablets (2,000 Units total) by mouth once daily. 180 tablet 3     No facility-administered encounter medications on file as of 1/29/2025.     1. Centrilobular emphysema    2. Chronic respiratory failure with hypoxia    Plan:     Problem List Items Addressed This Visit          Pulmonary    Chronic respiratory failure with hypoxia (Chronic)    Overview     POA  Home O2- 2L         Current Assessment & Plan     Significant chronic condition to be followed longitudinally with following long term treatment plan.      Related to progression of underlying COPD and CHF. Repeat 6MWD shows persistent O2 needs.  Rx POC. Continue complaince 24/7    -increase O2 to 5 for now but if Sat <90, will require admission         COPD (chronic obstructive pulmonary disease) - Primary    Current Assessment & Plan     Significant chronic condition to be followed longitudinally with following long term treatment plan.     Breztri inhaler BID with spacer; prior to exacerbation had relatively good control.  PRN albuterol use. Getting Xopenex nebs at inpatient rehab - discussed caution with Afib history.  Discussed no role for chronic steroids in COPD given increased mortality.    Pulmonary Rehab once no longer getting rehab- will discuss after next inperson  visit.    Treat severe exacerbation with Prednisone burst and Doxycycline. No sick contacts so flu less likely. ER warning given julius with her increased O2 needs. Patient and daughter understand with any worsening, she needs to present to emergency room. Currently we have made the following hospitalization decision: attempt to avoid hospitalization and attempt outpatient treatment with low threshold for admission with failure to improve or worsening..          Patient with severe chronic illness listed above with severe exacerbation and/or progression leading to threat to pulmonary function requiring this visit.     No follow-ups on file.    Future Appointments   Date Time Provider Department Center   2/4/2025  9:20 AM Valerio Andino MD [facility] CARDIO [facility]   3/18/2025 11:20 AM Isela Langston MD [id number] [place]   4/3/2025  1:00 PM Ibeth Christy MD [facility] NEPHRO Isaias Qiu MD

## 2025-01-30 ENCOUNTER — DOCUMENTATION ONLY (OUTPATIENT)
Dept: CARDIOLOGY | Facility: CLINIC | Age: 80
End: 2025-01-30
Payer: MEDICARE

## 2025-01-30 DIAGNOSIS — Z00.00 ENCOUNTER FOR MEDICARE ANNUAL WELLNESS EXAM: ICD-10-CM

## 2025-01-30 LAB
ANION GAP SERPL CALC-SCNC: 9 MMOL/L (ref 8–16)
ASCENDING AORTA: 2.86 CM
AV AREA BY CONTINUOUS VTI: 2.4 CM2
AV INDEX (PROSTH): 0.84
AV LVOT MEAN GRADIENT: 3 MMHG
AV LVOT PEAK GRADIENT: 5 MMHG
AV MEAN GRADIENT: 4 MMHG
AV PEAK GRADIENT: 9 MMHG
AV VALVE AREA BY VELOCITY RATIO: 2.3 CM²
AV VALVE AREA: 2.4 CM2
AV VELOCITY RATIO: 0.8
BSA FOR ECHO PROCEDURE: 1.7 M2
BUN SERPL-MCNC: 20 MG/DL (ref 8–23)
CALCIUM SERPL-MCNC: 10.1 MG/DL (ref 8.7–10.5)
CHLORIDE SERPL-SCNC: 109 MMOL/L (ref 95–110)
CO2 SERPL-SCNC: 26 MMOL/L (ref 23–29)
CREAT SERPL-MCNC: 1.2 MG/DL (ref 0.5–1.4)
CV ECHO LV RWT: 0.42 CM
DOP CALC AO PEAK VEL: 1.5 M/S
DOP CALC AO VTI: 28.6 CM
DOP CALC LVOT AREA: 2.8 CM2
DOP CALC LVOT DIAMETER: 1.9 CM
DOP CALC LVOT PEAK VEL: 1.2 M/S
DOP CALC LVOT STROKE VOLUME: 68.3 CM3
DOP CALCLVOT PEAK VEL VTI: 24.1 CM
E/E' RATIO: 20 M/S
ECHO EF ESTIMATED: 52 %
ECHO LV POSTERIOR WALL: 0.9 CM (ref 0.6–1.1)
EJECTION FRACTION: 55 %
EST. GFR  (NO RACE VARIABLE): 45.5 ML/MIN/1.73 M^2
ESTIMATED AVG GLUCOSE: 94 MG/DL (ref 68–131)
FRACTIONAL SHORTENING: 27.9 % (ref 28–44)
GLUCOSE SERPL-MCNC: 134 MG/DL (ref 70–110)
HBA1C MFR BLD: 4.9 % (ref 4–5.6)
INTERVENTRICULAR SEPTUM: 0.9 CM (ref 0.6–1.1)
IVC DIAMETER: 2.7 CM
IVRT: 63 MS
LA MAJOR: 5 CM
LA MINOR: 5 CM
LA WIDTH: 4.3 CM
LACTATE SERPL-SCNC: 2.1 MMOL/L (ref 0.5–2.2)
LEFT ATRIUM SIZE: 2.8 CM
LEFT ATRIUM VOLUME INDEX MOD: 58 ML/M2
LEFT ATRIUM VOLUME INDEX: 30 ML/M2
LEFT ATRIUM VOLUME MOD: 98 ML
LEFT ATRIUM VOLUME: 51 CM3
LEFT INTERNAL DIMENSION IN SYSTOLE: 3.1 CM (ref 2.1–4)
LEFT VENTRICLE DIASTOLIC VOLUME INDEX: 48.26 ML/M2
LEFT VENTRICLE DIASTOLIC VOLUME: 81.08 ML
LEFT VENTRICLE MASS INDEX: 73.4 G/M2
LEFT VENTRICLE SYSTOLIC VOLUME INDEX: 23.1 ML/M2
LEFT VENTRICLE SYSTOLIC VOLUME: 38.79 ML
LEFT VENTRICULAR INTERNAL DIMENSION IN DIASTOLE: 4.3 CM (ref 3.5–6)
LEFT VENTRICULAR MASS: 123.3 G
LV LATERAL E/E' RATIO: 19.6 M/S
LV SEPTAL E/E' RATIO: 19.6 M/S
MAGNESIUM SERPL-MCNC: 2 MG/DL (ref 1.6–2.6)
MV PEAK E VEL: 1.37 M/S
MV PEAK GRADIENT: 4 MMHG
OHS CV RV/LV RATIO: 0.91 CM
OHS QRS DURATION: 152 MS
OHS QRS DURATION: 154 MS
OHS QTC CALCULATION: 507 MS
OHS QTC CALCULATION: 525 MS
PISA TR MAX VEL: 3 M/S
POTASSIUM SERPL-SCNC: 3.8 MMOL/L (ref 3.5–5.1)
RA MAJOR: 4.84 CM
RA PRESSURE ESTIMATED: 15 MMHG
RA WIDTH: 3.27 CM
RIGHT ATRIAL AREA: 19.2 CM2
RIGHT VENTRICLE DIASTOLIC BASEL DIMENSION: 3.9 CM
RV TB RVSP: 18 MMHG
RV TISSUE DOPPLER FREE WALL SYSTOLIC VELOCITY 1 (APICAL 4 CHAMBER VIEW): 10.72 CM/S
SINUS: 3.24 CM
SODIUM SERPL-SCNC: 144 MMOL/L (ref 136–145)
STJ: 2.51 CM
TDI LATERAL: 0.07 M/S
TDI SEPTAL: 0.07 M/S
TDI: 0.07 M/S
TR MAX PG: 36 MMHG
TRICUSPID ANNULAR PLANE SYSTOLIC EXCURSION: 2.23 CM
TROPONIN I SERPL DL<=0.01 NG/ML-MCNC: 28 NG/L (ref 0–14)
TV PEAK GRADIENT: 36 MMHG
TV REST PULMONARY ARTERY PRESSURE: 51 MMHG
Z-SCORE OF LEFT VENTRICULAR DIMENSION IN END DIASTOLE: -0.86
Z-SCORE OF LEFT VENTRICULAR DIMENSION IN END SYSTOLE: 0.52

## 2025-01-30 PROCEDURE — 94761 N-INVAS EAR/PLS OXIMETRY MLT: CPT

## 2025-01-30 PROCEDURE — 20600001 HC STEP DOWN PRIVATE ROOM

## 2025-01-30 PROCEDURE — 83036 HEMOGLOBIN GLYCOSYLATED A1C: CPT | Performed by: INTERNAL MEDICINE

## 2025-01-30 PROCEDURE — 99285 EMERGENCY DEPT VISIT HI MDM: CPT | Mod: 25

## 2025-01-30 PROCEDURE — 27000221 HC OXYGEN, UP TO 24 HOURS

## 2025-01-30 PROCEDURE — 84484 ASSAY OF TROPONIN QUANT: CPT | Performed by: INTERNAL MEDICINE

## 2025-01-30 PROCEDURE — 25000003 PHARM REV CODE 250: Performed by: INTERNAL MEDICINE

## 2025-01-30 PROCEDURE — 83605 ASSAY OF LACTIC ACID: CPT | Performed by: INTERNAL MEDICINE

## 2025-01-30 PROCEDURE — 83735 ASSAY OF MAGNESIUM: CPT | Performed by: INTERNAL MEDICINE

## 2025-01-30 PROCEDURE — 63600175 PHARM REV CODE 636 W HCPCS: Performed by: INTERNAL MEDICINE

## 2025-01-30 PROCEDURE — 25000242 PHARM REV CODE 250 ALT 637 W/ HCPCS: Performed by: INTERNAL MEDICINE

## 2025-01-30 PROCEDURE — 94640 AIRWAY INHALATION TREATMENT: CPT

## 2025-01-30 PROCEDURE — 36415 COLL VENOUS BLD VENIPUNCTURE: CPT | Performed by: INTERNAL MEDICINE

## 2025-01-30 PROCEDURE — 80048 BASIC METABOLIC PNL TOTAL CA: CPT | Performed by: INTERNAL MEDICINE

## 2025-01-30 PROCEDURE — 99900035 HC TECH TIME PER 15 MIN (STAT)

## 2025-01-30 RX ORDER — FUROSEMIDE 10 MG/ML
60 INJECTION INTRAMUSCULAR; INTRAVENOUS EVERY 12 HOURS
Status: DISCONTINUED | OUTPATIENT
Start: 2025-01-30 | End: 2025-01-31

## 2025-01-30 RX ADMIN — DOXYCYCLINE HYCLATE 100 MG: 100 TABLET, COATED ORAL at 08:01

## 2025-01-30 RX ADMIN — ENOXAPARIN SODIUM 40 MG: 40 INJECTION SUBCUTANEOUS at 05:01

## 2025-01-30 RX ADMIN — FUROSEMIDE 40 MG: 10 INJECTION, SOLUTION INTRAVENOUS at 08:01

## 2025-01-30 RX ADMIN — LEVALBUTEROL 1.25 MG: 1.25 SOLUTION, CONCENTRATE RESPIRATORY (INHALATION) at 05:01

## 2025-01-30 RX ADMIN — PREDNISONE 60 MG: 50 TABLET ORAL at 08:01

## 2025-01-30 RX ADMIN — METOPROLOL SUCCINATE 12.5 MG: 25 TABLET, EXTENDED RELEASE ORAL at 08:01

## 2025-01-30 RX ADMIN — FUROSEMIDE 60 MG: 10 INJECTION, SOLUTION INTRAMUSCULAR; INTRAVENOUS at 09:01

## 2025-01-30 RX ADMIN — ASPIRIN 81 MG: 81 TABLET, COATED ORAL at 08:01

## 2025-01-30 RX ADMIN — LEVALBUTEROL 1.25 MG: 1.25 SOLUTION, CONCENTRATE RESPIRATORY (INHALATION) at 07:01

## 2025-01-30 RX ADMIN — BUSPIRONE HYDROCHLORIDE 5 MG: 5 TABLET ORAL at 08:01

## 2025-01-30 RX ADMIN — ACETAMINOPHEN 650 MG: 325 TABLET ORAL at 05:01

## 2025-01-30 RX ADMIN — DOXYCYCLINE HYCLATE 100 MG: 100 TABLET, COATED ORAL at 09:01

## 2025-01-30 NOTE — ED TRIAGE NOTES
Misa Barajas, a 79 y.o. female presents to the ED w/ complaint of SOB. Pt noted with audible wheeze.     Triage note:  Chief Complaint   Patient presents with    Shortness of Breath     Arrives via EMS from home. SOB started about 2 days ago. Mild improvement with home meds, but symptoms returned. + wheezing and BLE edema. SpO2 78-80% on home O2 of 5L with super extended cable. Was placed on 6L with EMS and SpO2 improved to 92%. DuoNeb in process.      Review of patient's allergies indicates:   Allergen Reactions    Adhesive tape-silicones     Adhesive Rash     Pt states she removed a LATEX bandaid and the skin beneath was swollen and red. No other latex causes a reaction.     Past Medical History:   Diagnosis Date    Acute biliary pancreatitis 07/27/2024    Anemia 07/12/2024    Aortic atherosclerosis     Arthritis     knee joint pain    Asthma-COPD overlap syndrome 08/22/2022    home o2    Breast cancer 2002    left breast & lymph nodes-s/p sx with chemo    Cataracts, bilateral     Chronic diastolic heart failure 12/24/2019    Chronic kidney disease, stage 3     Class 1 obesity due to excess calories with serious comorbidity and body mass index (BMI) of 30.0 to 30.9 in adult 05/16/2024    History of chemotherapy     last treatment 12/2002 (had 8 treatments)    Hyperlipidemia 11/20/2016    Hypertension     Lymphedema of both lower extremities 01/25/2022    Nephrolithiasis 06/02/2014    Osteoporosis     Paroxysmal atrial fibrillation 06/07/2021    Renal osteodystrophy 01/14/2016    Venous stasis dermatitis of both lower extremities 01/25/2022    Vitamin D insufficiency

## 2025-01-30 NOTE — H&P
[facility] - Emergency Dept  Hospital Medicine  History & Physical    Patient Name: Misa Barajas  MRN: [id number]  Patient Class: IP- Inpatient  Admission Date: 1/29/2025  Attending Physician: Ilene Wu MD   Primary Care Provider: Isela Langston MD         Patient information was obtained from patient, past medical records, and ER records.     Subjective:     Principal Problem:Acute on chronic diastolic heart failure    Chief Complaint:   Chief Complaint   Patient presents with    Shortness of Breath     Arrives via EMS from home. SOB started about 2 days ago. Mild improvement with home meds, but symptoms returned. + wheezing and BLE edema. SpO2 78-80% on home O2 of 5L with super extended cable. Was placed on 6L with EMS and SpO2 improved to 92%. DuoNeb in process.         HPI: 80 yo female with COPD on 2L chronically, CHF, CKD, HTN, presenting for shortness of breath and wheezing for the past 2 days. She has been at home trying to fight it off she was able to turn up her oxygen to 5L at home and was taking duonebs at home. She called her pulmonologist who started her on doxy and prednisone. She had a prolonged stay last year for similar symptoms so she did not want to come to the hospital but eventually did come whenever she was not feeling any better. In the ER, she was placed on a non re-breather, which improved her symptoms.     In the ER, Bnp noted to be elevated to 2500, troponin elevated to 19, CXR with fluid. Lasix given. Medicine called for admission.     Past Medical History:   Diagnosis Date    Acute biliary pancreatitis 07/27/2024    Anemia 07/12/2024    Aortic atherosclerosis     Arthritis     knee joint pain    Asthma-COPD overlap syndrome 08/22/2022    home o2    Breast cancer 2002    left breast & lymph nodes-s/p sx with chemo    Cataracts, bilateral     Chronic diastolic heart failure 12/24/2019    Chronic kidney disease, stage 3     Class 1 obesity due to excess calories with serious  comorbidity and body mass index (BMI) of 30.0 to 30.9 in adult 05/16/2024    History of chemotherapy     last treatment 12/2002 (had 8 treatments)    Hyperlipidemia 11/20/2016    Hypertension     Lymphedema of both lower extremities 01/25/2022    Nephrolithiasis 06/02/2014    Osteoporosis     Paroxysmal atrial fibrillation 06/07/2021    Renal osteodystrophy 01/14/2016    Venous stasis dermatitis of both lower extremities 01/25/2022    Vitamin D insufficiency        Past Surgical History:   Procedure Laterality Date    ABLATION, ATRIAL FLUTTER, TYPICAL N/A 6/17/2024    Procedure: Ablation, Atrial Flutter, Typical;  Surgeon: Taz Church MD;  Location: [facility] EP LAB;  Service: Cardiology;  Laterality: N/A;  AFL, RFA, LORENE, MAKAYLA (cx if SR), LEANNE miller, 3 Prep    BREAST BIOPSY Left 2002    core bx, +    BREAST BIOPSY Right 2018    core    BREAST BIOPSY Right 2019    BREAST LUMPECTOMY Left 2002    CYSTOSCOPY  4/28/2022    Procedure: CYSTOSCOPY;  Surgeon: Vik Ware MD;  Location: [facility] OR 1ST FLR;  Service: Urology;;    CYSTOSCOPY  5/30/2022    Procedure: CYSTOSCOPY;  Surgeon: Bonnie Knutson MD;  Location: [facility] OR 1ST FLR;  Service: Urology;;    ECHOCARDIOGRAM,TRANSESOPHAGEAL N/A 6/17/2024    Procedure: Transesophageal echo (MAKAYLA) intra-procedure log documentation;  Surgeon: Nestor Martinez MD;  Location: [facility] EP LAB;  Service: Cardiology;  Laterality: N/A;    ERCP N/A 7/30/2024    Procedure: ERCP (ENDOSCOPIC RETROGRADE CHOLANGIOPANCREATOGRAPHY);  Surgeon: Sierra Cotto MD;  Location: [facility] ENDO (2ND FLR);  Service: Endoscopy;  Laterality: N/A;    LASER LITHOTRIPSY  5/30/2022    Procedure: LITHOTRIPSY, USING LASER;  Surgeon: Bonnie Knutson MD;  Location: [facility] OR 1ST FLR;  Service: Urology;;    LITHOTRIPSY      MASTECTOMY Left 06/2002    left-& lymph node dissection    REPLACEMENT OF STENT Right 5/30/2022    Procedure: REPLACEMENT, STENT;  Surgeon: Bonnie Knutson MD;  Location: [facility] OR  1ST FLR;  Service: Urology;  Laterality: Right;    RETROGRADE PYELOGRAPHY Right 4/28/2022    Procedure: PYELOGRAM, RETROGRADE;  Surgeon: Vik Ware MD;  Location: [facility] OR 1ST FLR;  Service: Urology;  Laterality: Right;    ROBOT-ASSISTED LAPAROSCOPIC PARTIAL NEPHRECTOMY USING DA JESÚS XI Right 3/11/2021    Procedure: XI ROBOTIC NEPHRECTOMY, PARTIAL;  Surgeon: Taz Ortega MD;  Location: [facility] OR 2ND FLR;  Service: Urology;  Laterality: Right;  4hr/ gen with regional  [facility] confirmation:  [phone] for 11:15am case NC    URETEROSCOPIC REMOVAL OF URETERIC CALCULUS Right 5/30/2022    Procedure: REMOVAL, CALCULUS, URETER, URETEROSCOPIC;  Surgeon: Bonnie Knutson MD;  [facility];  Service: Urology;  Laterality: Right;    ureteroscopy Bilateral     6.14    URETEROSCOPY Right 5/30/2022    Procedure: URETEROSCOPY;  Surgeon: Bonnie Knutson MD;  Location: [facility] OR [address] [place] [address];  Service: Urology;  Laterality: Right;       Review of patient's allergies indicates:   Allergen Reactions    Adhesive tape-silicones     Adhesive Rash     Pt states she removed a LATEX bandaid and the skin beneath was swollen and red. No other latex causes a reaction.       No current facility-administered medications on file prior to encounter.     Current Outpatient Medications on File Prior to Encounter   Medication Sig    acetaminophen (TYLENOL) 500 MG tablet     albuterol sulfate (PROAIR RESPICLICK) 90 mcg/actuation inhaler 2 puffs as needed Inhalation every 6 hrs 30 days    aspirin (ECOTRIN) 81 MG EC tablet 1 tablet Orally Once a day 30 days    BD POSIFLUSH NORMAL SALINE 0.9 injection     biotin 1 mg Cap 1 capsule Orally Once a day 30 days    budesonide-glycopyr-formoterol (BREZTRI AEROSPHERE) 160-9-4.8 mcg/actuation HFAA Inhale 2 puffs into the lungs 2 (two) times daily.    busPIRone (BUSPAR) 5 MG Tab 1 tablet Orally once daily as needed 30 days    doxycycline (VIBRA-TABS) 100 MG tablet Take 1 tablet (100  mg total) by mouth 2 (two) times daily. for 10 days    ferrous sulfate (FEOSOL) 325 mg (65 mg iron) Tab tablet Take 1 tablet (325 mg total) by mouth every other day.    fludrocortisone (FLORINEF) 0.1 mg Tab Take 2 tablets (0.2 mg total) by mouth once daily.    fluticasone propionate (FLONASE) 50 mcg/actuation nasal spray 1 spray by Each Nostril route daily as needed for Rhinitis or Allergies.    furosemide (LASIX) 40 MG tablet Take 1 tablet (40 mg total) by mouth once daily.    inhalation spacing device Use as directed for inhalation.    levalbuterol (XOPENEX) 1.25 mg/3 mL nebulizer solution Take 1 ampule by nebulization every 8 (eight) hours as needed for Wheezing or Shortness of Breath.    magnesium hydroxide 400 mg/5 ml (MILK OF MAGNESIA) 400 mg/5 mL Susp 30 ml as neded Orally once daily    meclizine (ANTIVERT) 12.5 mg tablet Take 1 tablet (12.5 mg total) by mouth Daily.    metoprolol succinate (TOPROL-XL) 25 MG 24 hr tablet Take ½ tablet (12.5 mg total) by mouth once daily.    ondansetron (ZOFRAN) 4 MG tablet 1 tablet as needed for nausea Orally every 8 hours 7 days    polyethylene glycol (MIRALAX) 17 gram/dose powder     potassium chloride (MICRO-K) 10 MEQ CpSR Take 2 capsules (20 mEq total) by mouth once daily.    predniSONE (DELTASONE) 20 MG tablet Take 2 tablets (40 mg total) by mouth once daily. for 5 days    senna-docusate 8.6-50 mg (PERICOLACE) 8.6-50 mg per tablet 1 tablet Orally Twice a day    traZODone (DESYREL) 50 MG tablet Take 0.5 tablets (25 mg total) by mouth nightly as needed for Insomnia (Anxiety or insomnia).    triamcinolone acetonide 0.1% (KENALOG) 0.1 % cream Apply externally to affected area twice a day until clear    vitamin D (VITAMIN D3) 1000 units Tab Take 2 tablets (2,000 Units total) by mouth once daily.     Family History       Problem Relation (Age of Onset)    Arthritis Mother, Sister    Bone cancer Mother    Breast cancer Mother, Sister    Cancer Father    Crohn's disease Daughter     Fibroids Daughter    No Known Problems Brother, Daughter          Tobacco Use    Smoking status: Former     Current packs/day: 0.00     Average packs/day: 1 pack/day for 50.0 years (50.0 ttl pk-yrs)     Types: Cigarettes     Start date: 1960     Quit date: 5/20/2009     Years since quitting: 15.7    Smokeless tobacco: Former   Substance and Sexual Activity    Alcohol use: No    Drug use: No    Sexual activity: Not Currently     Partners: Male     Birth control/protection: Partner-Vasectomy     Review of Systems   Constitutional:  Negative for activity change, appetite change, chills and fever.   HENT:  Negative for congestion, hearing loss and rhinorrhea.    Eyes:  Negative for discharge, itching and visual disturbance.   Respiratory:  Positive for cough and shortness of breath. Negative for apnea.    Cardiovascular:  Positive for leg swelling. Negative for chest pain and palpitations.   Gastrointestinal:  Negative for abdominal distention, abdominal pain, constipation, diarrhea, nausea and vomiting.   Endocrine: Negative for cold intolerance and heat intolerance.   Genitourinary:  Negative for dysuria and hematuria.   Musculoskeletal:  Negative for back pain, neck pain and neck stiffness.   Skin:  Negative for rash and wound.   Neurological:  Negative for dizziness, seizures, light-headedness and headaches.   Psychiatric/Behavioral:  Negative for agitation, confusion and suicidal ideas.      Objective:     Vital Signs (Most Recent):  Temp: 98.1 °F (36.7 °C) (01/30/25 0050)  Pulse: 90 (01/30/25 0050)  Resp: 20 (01/30/25 0050)  BP: (!) 155/76 (01/30/25 0050)  SpO2: 98 % (01/30/25 0050) Vital Signs (24h Range):  Temp:  [96.4 °F (35.8 °C)-98.8 °F (37.1 °C)] 98.1 °F (36.7 °C)  Pulse:  [] 90  Resp:  [20-33] 20  SpO2:  [90 %-98 %] 98 %  BP: (146-188)/(76-99) 155/76     Weight: 66.2 kg (146 lb)  Body mass index is 25.06 kg/m².     Physical Exam  Vitals reviewed.   Constitutional:       General: She is not in acute  distress.     Appearance: She is well-developed.   HENT:      Head: Normocephalic and atraumatic.      Nose: Nose normal. No rhinorrhea.      Mouth/Throat:      Mouth: Mucous membranes are moist.   Eyes:      General: No scleral icterus.        Right eye: No discharge.         Left eye: No discharge.      Pupils: Pupils are equal, round, and reactive to light.   Neck:      Vascular: No JVD.   Cardiovascular:      Rate and Rhythm: Normal rate and regular rhythm.      Heart sounds: Normal heart sounds. No murmur heard.     No friction rub.   Pulmonary:      Effort: Pulmonary effort is normal. No respiratory distress.      Breath sounds: Normal breath sounds. No wheezing.   Abdominal:      General: Bowel sounds are normal. There is no distension.      Palpations: Abdomen is soft.      Tenderness: There is no abdominal tenderness.   Musculoskeletal:         General: No deformity. Normal range of motion.      Cervical back: Normal range of motion and neck supple.   Skin:     General: Skin is warm and dry.   Neurological:      General: No focal deficit present.      Mental Status: She is alert and oriented to person, place, and time.   Psychiatric:         Mood and Affect: Mood normal.         Behavior: Behavior normal.              CRANIAL NERVES     CN III, IV, VI   Pupils are equal, round, and reactive to light.       Significant Labs: All pertinent labs within the past 24 hours have been reviewed.  CBC:   Recent Labs   Lab 01/29/25  1758   WBC 6.77   HGB 12.2   HCT 38.4        CMP:   Recent Labs   Lab 01/29/25  1758      K 3.6      CO2 21*   *   BUN 16   CREATININE 1.2   CALCIUM 10.5   PROT 7.4   ALBUMIN 3.0*   BILITOT 0.7   ALKPHOS 129   AST 38   ALT 21   ANIONGAP 13     Cardiac Markers:   Recent Labs   Lab 01/29/25  1758   BNP 2,548*     Troponin:   Recent Labs   Lab 01/29/25  1758   TROPONINIHS 19*       Significant Imaging: I have reviewed all pertinent imaging results/findings within the  past 24 hours.  Assessment/Plan:     * Acute on chronic diastolic heart failure  - CHF pathway started  - uncontrolled  - s/s corresponding to heart failure exacerbation  - last ECHO with Results for orders placed during the hospital encounter of 08/26/24    Echo    Interpretation Summary    Left Ventricle: The left ventricle is normal in size. Normal wall thickness. There is concentric remodeling. There is normal systolic function with a visually estimated ejection fraction of 60 - 65%. There is normal diastolic function.    Right Ventricle: Normal right ventricular cavity size. Wall thickness is normal. Systolic function is normal.    Aortic Valve: The aortic valve is a trileaflet valve. There is mild aortic valve sclerosis.    Mitral Valve: There is moderate mitral annular calcification present.    Tricuspid Valve: There is mild regurgitation.    Pulmonary Artery: The estimated pulmonary artery systolic pressure is 34 mmHg.    IVC/SVC: Normal venous pressure at 3 mmHg.    - BNP elevated to >2500, troponin 19  - will continue diuresis with lasix 40mg IV BID  - strict I/Os, fluid restricted diet  - monitor daily weights, telemetry  - replace K and Mg above 4 and 2 resp  - monitor oxygenation  - Continue Beta Blocker and Furosemide               COPD (chronic obstructive pulmonary disease)  Patient's COPD is with exacerbation noted by continued dyspnea and worsening of baseline hypoxia currently.  Patient is currently off COPD Pathway. Continue scheduled inhalers Steroids, Antibiotics, and Supplemental oxygen and monitor respiratory status closely.     Chronic respiratory failure with hypoxia  Patient with Hypoxic Respiratory failure which is Acute on chronic.  she is on home oxygen at 2 LPM. Supplemental oxygen was provided and noted-  10L    .   Signs/symptoms of respiratory failure include- increased work of breathing. Contributing diagnoses includes - CHF and COPD Labs and images were reviewed. Patient Has not  had a recent ABG. Will treat underlying causes and adjust management of respiratory failure as follows- started CHF pathway, will continue doxy and steroids    Hyperlipidemia  Chronic, controlled, continue to monitor    HTN (hypertension)  Patient's blood pressure range in the last 24 hours was: BP  Min: 146/92  Max: 188/99.The patient's inpatient anti-hypertensive regimen is listed below:  Current Antihypertensives  metoprolol succinate (TOPROL-XL) 24 hr split tablet 12.5 mg, Daily, Oral  furosemide injection 40 mg, Every 12 hours, Intravenous    Plan  - BP is controlled, no changes needed to their regimen        VTE Risk Mitigation (From admission, onward)           Ordered     enoxaparin injection 40 mg  Daily         01/29/25 2312     IP VTE HIGH RISK PATIENT  Once         01/29/25 2312     Place sequential compression device  Until discontinued         01/29/25 2312                                    David Rudolph MD  Department of Hospital Medicine  [facility] - Emergency Dept

## 2025-01-30 NOTE — CARE UPDATE
Shortness of breath has improved, oxygen weaned down to 5 L. pedal edema noted, chest minimal creps to auscultation, no significant JVD.  Echocardiogram ordered.  Continue with IV Lasix and COPD management    Addendum 3:48 p.m. echo report noted, normal EF and no reports of diastolic dysfunction however significant elevated pulmonary arterial estimated pressures 51mmgh in addition to elevated CVP of 15.  We will increase Lasix to 60 mg b.i.d. and continue copd management.  Outpatient pulmonary follow up

## 2025-01-30 NOTE — CARE UPDATE
I have reviewed the chart of Misa Barajas who is hospitalized for the following:    Active Hospital Problems    Diagnosis    *Acute on chronic diastolic heart failure    COPD (chronic obstructive pulmonary disease)    Atelectasis     CXR on admit with atelectasis and pleural effusions, continue to diurese and monitor vitals       Chronic respiratory failure with hypoxia     POA  Home O2- 2L      Pleural effusion     CXR with bilateral pleural effusions  Supplemental O2 at 5L  Continue to diurese       Hyperlipidemia    HTN (hypertension)        Tyra Corey PA-C  [facility]

## 2025-01-30 NOTE — HPI
78 yo female with COPD on 2L chronically, CHF, CKD, HTN, presenting for shortness of breath and wheezing for the past 2 days. She has been at home trying to fight it off she was able to turn up her oxygen to 5L at home and was taking duonebs at home. She called her pulmonologist who started her on doxy and prednisone. She had a prolonged stay last year for similar symptoms so she did not want to come to the hospital but eventually did come whenever she was not feeling any better. In the ER, she was placed on a non re-breather, which improved her symptoms.     In the ER, Bnp noted to be elevated to 2500, troponin elevated to 19, CXR with fluid. Lasix given. Medicine called for admission.

## 2025-01-30 NOTE — SUBJECTIVE & OBJECTIVE
Past Medical History:   Diagnosis Date    Acute biliary pancreatitis 07/27/2024    Anemia 07/12/2024    Aortic atherosclerosis     Arthritis     knee joint pain    Asthma-COPD overlap syndrome 08/22/2022    home o2    Breast cancer 2002    left breast & lymph nodes-s/p sx with chemo    Cataracts, bilateral     Chronic diastolic heart failure 12/24/2019    Chronic kidney disease, stage 3     Class 1 obesity due to excess calories with serious comorbidity and body mass index (BMI) of 30.0 to 30.9 in adult 05/16/2024    History of chemotherapy     last treatment 12/2002 (had 8 treatments)    Hyperlipidemia 11/20/2016    Hypertension     Lymphedema of both lower extremities 01/25/2022    Nephrolithiasis 06/02/2014    Osteoporosis     Paroxysmal atrial fibrillation 06/07/2021    Renal osteodystrophy 01/14/2016    Venous stasis dermatitis of both lower extremities 01/25/2022    Vitamin D insufficiency        Past Surgical History:   Procedure Laterality Date    ABLATION, ATRIAL FLUTTER, TYPICAL N/A 6/17/2024    Procedure: Ablation, Atrial Flutter, Typical;  Surgeon: Taz Church MD;  Location: [facility] EP LAB;  Service: Cardiology;  Laterality: N/A;  AFL, RFA, LORENE, MAKAYLA (cx if SR), anes, MB, 3 Prep    BREAST BIOPSY Left 2002    core bx, +    BREAST BIOPSY Right 2018    core    BREAST BIOPSY Right 2019    BREAST LUMPECTOMY Left 2002    CYSTOSCOPY  4/28/2022    Procedure: CYSTOSCOPY;  Surgeon: Vik Ware MD;  Location: [facility] OR [address] [place] [address];  Service: Urology;;    CYSTOSCOPY  5/30/2022    Procedure: CYSTOSCOPY;  Surgeon: Bonnie Knutson MD;  Location: [facility] OR [address] [place] [address];  Service: Urology;;    ECHOCARDIOGRAM,TRANSESOPHAGEAL N/A 6/17/2024    Procedure: Transesophageal echo (MAKAYLA) intra-procedure log documentation;  Surgeon: Nestor Martinez MD;  Location: [facility] EP LAB;  Service: Cardiology;  Laterality: N/A;    ERCP N/A 7/30/2024    Procedure: ERCP (ENDOSCOPIC RETROGRADE CHOLANGIOPANCREATOGRAPHY);  Surgeon: Yadiel  Sierra MARQUEZ MD;  Location: [facility] ENDO (2ND FLR);  Service: Endoscopy;  Laterality: N/A;    LASER LITHOTRIPSY  5/30/2022    Procedure: LITHOTRIPSY, USING LASER;  Surgeon: Bonnie Knutson MD;  [facility];  Service: Urology;;    LITHOTRIPSY      MASTECTOMY Left 06/2002    left-& lymph node dissection    REPLACEMENT OF STENT Right 5/30/2022    Procedure: REPLACEMENT, STENT;  Surgeon: Bonnie Knutson MD;  [facility];  Service: Urology;  Laterality: Right;    RETROGRADE PYELOGRAPHY Right 4/28/2022    Procedure: PYELOGRAM, RETROGRADE;  Surgeon: Vik Ware MD;  [facility];  Service: Urology;  Laterality: Right;    ROBOT-ASSISTED LAPAROSCOPIC PARTIAL NEPHRECTOMY USING DA JESÚS XI Right 3/11/2021    Procedure: XI ROBOTIC NEPHRECTOMY, PARTIAL;  Surgeon: Taz Ortega MD;  [facility];  Service: Urology;  Laterality: Right;  4hr/ gen with regional  [facility] confirmation:  [id number] for 11:15am case NC    URETEROSCOPIC REMOVAL OF URETERIC CALCULUS Right 5/30/2022    Procedure: REMOVAL, CALCULUS, URETER, URETEROSCOPIC;  Surgeon: Bonnie Knutson MD;  [address];  Service: Urology;  Laterality: Right;    ureteroscopy Bilateral     6.14    URETEROSCOPY Right 5/30/2022    Procedure: URETEROSCOPY;  Surgeon: Bonnie Knutson MD;  [address];  Service: Urology;  Laterality: Right;       Review of patient's allergies indicates:   Allergen Reactions    Adhesive tape-silicones     Adhesive Rash     Pt states she removed a LATEX bandaid and the skin beneath was swollen and red. No other latex causes a reaction.       No current facility-administered medications on file prior to encounter.     Current Outpatient Medications on File Prior to Encounter   Medication Sig    acetaminophen (TYLENOL) 500 MG tablet     albuterol sulfate (PROAIR RESPICLICK) 90 mcg/actuation inhaler 2 puffs as needed Inhalation every 6 hrs 30  days    aspirin (ECOTRIN) 81 MG EC tablet 1 tablet Orally Once a day 30 days    BD POSIFLUSH NORMAL SALINE 0.9 injection     biotin 1 mg Cap 1 capsule Orally Once a day 30 days    budesonide-glycopyr-formoterol (BREZTRI AEROSPHERE) 160-9-4.8 mcg/actuation HFAA Inhale 2 puffs into the lungs 2 (two) times daily.    busPIRone (BUSPAR) 5 MG Tab 1 tablet Orally once daily as needed 30 days    doxycycline (VIBRA-TABS) 100 MG tablet Take 1 tablet (100 mg total) by mouth 2 (two) times daily. for 10 days    ferrous sulfate (FEOSOL) 325 mg (65 mg iron) Tab tablet Take 1 tablet (325 mg total) by mouth every other day.    fludrocortisone (FLORINEF) 0.1 mg Tab Take 2 tablets (0.2 mg total) by mouth once daily.    fluticasone propionate (FLONASE) 50 mcg/actuation nasal spray 1 spray by Each Nostril route daily as needed for Rhinitis or Allergies.    furosemide (LASIX) 40 MG tablet Take 1 tablet (40 mg total) by mouth once daily.    inhalation spacing device Use as directed for inhalation.    levalbuterol (XOPENEX) 1.25 mg/3 mL nebulizer solution Take 1 ampule by nebulization every 8 (eight) hours as needed for Wheezing or Shortness of Breath.    magnesium hydroxide 400 mg/5 ml (MILK OF MAGNESIA) 400 mg/5 mL Susp 30 ml as neded Orally once daily    meclizine (ANTIVERT) 12.5 mg tablet Take 1 tablet (12.5 mg total) by mouth Daily.    metoprolol succinate (TOPROL-XL) 25 MG 24 hr tablet Take ½ tablet (12.5 mg total) by mouth once daily.    ondansetron (ZOFRAN) 4 MG tablet 1 tablet as needed for nausea Orally every 8 hours 7 days    polyethylene glycol (MIRALAX) 17 gram/dose powder     potassium chloride (MICRO-K) 10 MEQ CpSR Take 2 capsules (20 mEq total) by mouth once daily.    predniSONE (DELTASONE) 20 MG tablet Take 2 tablets (40 mg total) by mouth once daily. for 5 days    senna-docusate 8.6-50 mg (PERICOLACE) 8.6-50 mg per tablet 1 tablet Orally Twice a day    traZODone (DESYREL) 50 MG tablet Take 0.5 tablets (25 mg total) by  mouth nightly as needed for Insomnia (Anxiety or insomnia).    triamcinolone acetonide 0.1% (KENALOG) 0.1 % cream Apply externally to affected area twice a day until clear    vitamin D (VITAMIN D3) 1000 units Tab Take 2 tablets (2,000 Units total) by mouth once daily.     Family History       Problem Relation (Age of Onset)    Arthritis Mother, Sister    Bone cancer Mother    Breast cancer Mother, Sister    Cancer Father    Crohn's disease Daughter    Fibroids Daughter    No Known Problems Brother, Daughter          Tobacco Use    Smoking status: Former     Current packs/day: 0.00     Average packs/day: 1 pack/day for 50.0 years (50.0 ttl pk-yrs)     Types: Cigarettes     Start date: 1960     Quit date: 5/20/2009     Years since quitting: 15.7    Smokeless tobacco: Former   Substance and Sexual Activity    Alcohol use: No    Drug use: No    Sexual activity: Not Currently     Partners: Male     Birth control/protection: Partner-Vasectomy     Review of Systems   Constitutional:  Negative for activity change, appetite change, chills and fever.   HENT:  Negative for congestion, hearing loss and rhinorrhea.    Eyes:  Negative for discharge, itching and visual disturbance.   Respiratory:  Positive for cough and shortness of breath. Negative for apnea.    Cardiovascular:  Positive for leg swelling. Negative for chest pain and palpitations.   Gastrointestinal:  Negative for abdominal distention, abdominal pain, constipation, diarrhea, nausea and vomiting.   Endocrine: Negative for cold intolerance and heat intolerance.   Genitourinary:  Negative for dysuria and hematuria.   Musculoskeletal:  Negative for back pain, neck pain and neck stiffness.   Skin:  Negative for rash and wound.   Neurological:  Negative for dizziness, seizures, light-headedness and headaches.   Psychiatric/Behavioral:  Negative for agitation, confusion and suicidal ideas.      Objective:     Vital Signs (Most Recent):  Temp: 98.1 °F (36.7 °C) (01/30/25  0050)  Pulse: 90 (01/30/25 0050)  Resp: 20 (01/30/25 0050)  BP: (!) 155/76 (01/30/25 0050)  SpO2: 98 % (01/30/25 0050) Vital Signs (24h Range):  Temp:  [96.4 °F (35.8 °C)-98.8 °F (37.1 °C)] 98.1 °F (36.7 °C)  Pulse:  [] 90  Resp:  [20-33] 20  SpO2:  [90 %-98 %] 98 %  BP: (146-188)/(76-99) 155/76     Weight: 66.2 kg (146 lb)  Body mass index is 25.06 kg/m².     Physical Exam  Vitals reviewed.   Constitutional:       General: She is not in acute distress.     Appearance: She is well-developed.   HENT:      Head: Normocephalic and atraumatic.      Nose: Nose normal. No rhinorrhea.      Mouth/Throat:      Mouth: Mucous membranes are moist.   Eyes:      General: No scleral icterus.        Right eye: No discharge.         Left eye: No discharge.      Pupils: Pupils are equal, round, and reactive to light.   Neck:      Vascular: No JVD.   Cardiovascular:      Rate and Rhythm: Normal rate and regular rhythm.      Heart sounds: Normal heart sounds. No murmur heard.     No friction rub.   Pulmonary:      Effort: Pulmonary effort is normal. No respiratory distress.      Breath sounds: Normal breath sounds. No wheezing.   Abdominal:      General: Bowel sounds are normal. There is no distension.      Palpations: Abdomen is soft.      Tenderness: There is no abdominal tenderness.   Musculoskeletal:         General: No deformity. Normal range of motion.      Cervical back: Normal range of motion and neck supple.   Skin:     General: Skin is warm and dry.   Neurological:      General: No focal deficit present.      Mental Status: She is alert and oriented to person, place, and time.   Psychiatric:         Mood and Affect: Mood normal.         Behavior: Behavior normal.              CRANIAL NERVES     CN III, IV, VI   Pupils are equal, round, and reactive to light.       Significant Labs: All pertinent labs within the past 24 hours have been reviewed.  CBC:   Recent Labs   Lab 01/29/25  1758   WBC 6.77   HGB 12.2   HCT 38.4         CMP:   Recent Labs   Lab 01/29/25  1758      K 3.6      CO2 21*   *   BUN 16   CREATININE 1.2   CALCIUM 10.5   PROT 7.4   ALBUMIN 3.0*   BILITOT 0.7   ALKPHOS 129   AST 38   ALT 21   ANIONGAP 13     Cardiac Markers:   Recent Labs   Lab 01/29/25  1758   BNP 2,548*     Troponin:   Recent Labs   Lab 01/29/25  1758   TROPONINIHS 19*       Significant Imaging: I have reviewed all pertinent imaging results/findings within the past 24 hours.

## 2025-01-30 NOTE — ASSESSMENT & PLAN NOTE
Patient with Hypoxic Respiratory failure which is Acute on chronic.  she is on home oxygen at 2 LPM. Supplemental oxygen was provided and noted-  10L    .   Signs/symptoms of respiratory failure include- increased work of breathing. Contributing diagnoses includes - CHF and COPD Labs and images were reviewed. Patient Has not had a recent ABG. Will treat underlying causes and adjust management of respiratory failure as follows- started CHF pathway, will continue doxy and steroids

## 2025-01-30 NOTE — CONSULTS
Food & Nutrition  Education     Diet Education: Fluid and Salt restriction   Time Spent: 10 minutes   Learners: Patient      Handouts provided: Low Sodium Nutrition Therapy, Fluid-Restricted Nutrition Therapy      Comments: Discussed importance of a low sodium diet. Reviewed high sodium foods that should be avoided. Food labels, salt free seasonings, and recommended sodium intake reviewed. Encouraged healthy, fresh foods that are low in sodium that are good for consumption. Fluid intake and conversions discussed. Foods considered fluids were reviewed and encouraged to monitor. General healthy diet encouraged. All questions/concerns were addressed.     Follow-Up: Yes     Please Re-consult as needed.   Thanks!

## 2025-01-30 NOTE — ASSESSMENT & PLAN NOTE
- CHF pathway started  - uncontrolled  - s/s corresponding to heart failure exacerbation  - last ECHO with Results for orders placed during the hospital encounter of 08/26/24    Echo    Interpretation Summary    Left Ventricle: The left ventricle is normal in size. Normal wall thickness. There is concentric remodeling. There is normal systolic function with a visually estimated ejection fraction of 60 - 65%. There is normal diastolic function.    Right Ventricle: Normal right ventricular cavity size. Wall thickness is normal. Systolic function is normal.    Aortic Valve: The aortic valve is a trileaflet valve. There is mild aortic valve sclerosis.    Mitral Valve: There is moderate mitral annular calcification present.    Tricuspid Valve: There is mild regurgitation.    Pulmonary Artery: The estimated pulmonary artery systolic pressure is 34 mmHg.    IVC/SVC: Normal venous pressure at 3 mmHg.    - BNP elevated to >2500, troponin 19  - will continue diuresis with lasix 40mg IV BID  - strict I/Os, fluid restricted diet  - monitor daily weights, telemetry  - replace K and Mg above 4 and 2 resp  - monitor oxygenation  - Continue Beta Blocker and Furosemide

## 2025-01-30 NOTE — PROGRESS NOTES
10:30 attempted to enroll PT in [facility], PT sound asleep with mouth open. No response to loud verbal commands. PT c chest rising and falling gently with respirations.

## 2025-01-30 NOTE — NURSING
Patient admitted to CSU via stretcher on 5L. Patient arrived to floor from ED no evidence of distress; patient AAO x4 at this time. Patient placed on tele. Vital signs obtained. Patient voices no complaints at this time. Plan of care initiated with patient. Bed in lowest position, locked, SR up x2, call bell in reach. Will continue to monitor patient.

## 2025-01-30 NOTE — ED PROVIDER NOTES
History:  Misa Barajas is a 79 y.o. female who presents to the ED with Shortness of Breath (Arrives via EMS from home. SOB started about 2 days ago. Mild improvement with home meds, but symptoms returned. + wheezing and BLE edema. SpO2 78-80% on home O2 of 5L with super extended cable. Was placed on 6L with EMS and SpO2 improved to 92%. DuoNeb in process. )    Described as 79-year-old female with a history of hypertension, CKD, COPD, CHF on 2 L home O2 presenting to the emergency department with shortness of breath.  She reports she has been progressively more short of breath over the past few days.  She saw her pulmonologist yesterday and was increased from 2.5-5 L nasal cannula at home, started on prednisone and doxycycline with concern for COPD exacerbation.  Her symptoms continued to worsen, so she came to the emergency department for further evaluation.    Review of Systems: All systems reviewed and are negative except as per history of present illness.    Medications:   Previous Medications    ACETAMINOPHEN (TYLENOL) 500 MG TABLET        ALBUTEROL SULFATE (PROAIR RESPICLICK) 90 MCG/ACTUATION INHALER    2 puffs as needed Inhalation every 6 hrs 30 days    ASPIRIN (ECOTRIN) 81 MG EC TABLET    1 tablet Orally Once a day 30 days    BD POSIFLUSH NORMAL SALINE 0.9 INJECTION        BIOTIN 1 MG CAP    1 capsule Orally Once a day 30 days    BUDESONIDE-GLYCOPYR-FORMOTEROL (BREZTRI AEROSPHERE) 160-9-4.8 MCG/ACTUATION HFAA    Inhale 2 puffs into the lungs 2 (two) times daily.    BUSPIRONE (BUSPAR) 5 MG TAB    1 tablet Orally once daily as needed 30 days    DOXYCYCLINE (VIBRA-TABS) 100 MG TABLET    Take 1 tablet (100 mg total) by mouth 2 (two) times daily. for 10 days    FERROUS SULFATE (FEOSOL) 325 MG (65 MG IRON) TAB TABLET    Take 1 tablet (325 mg total) by mouth every other day.    FLUDROCORTISONE (FLORINEF) 0.1 MG TAB    Take 2 tablets (0.2 mg total) by mouth once daily.    FLUTICASONE PROPIONATE (FLONASE) 50  MCG/ACTUATION NASAL SPRAY    1 spray by Each Nostril route daily as needed for Rhinitis or Allergies.    FUROSEMIDE (LASIX) 40 MG TABLET    Take 1 tablet (40 mg total) by mouth once daily.    INHALATION SPACING DEVICE    Use as directed for inhalation.    LEVALBUTEROL (XOPENEX) 1.25 MG/3 ML NEBULIZER SOLUTION    Take 1 ampule by nebulization every 8 (eight) hours as needed for Wheezing or Shortness of Breath.    MAGNESIUM HYDROXIDE 400 MG/5 ML (MILK OF MAGNESIA) 400 MG/5 ML SUSP    30 ml as neded Orally once daily    MECLIZINE (ANTIVERT) 12.5 MG TABLET    Take 1 tablet (12.5 mg total) by mouth Daily.    METOPROLOL SUCCINATE (TOPROL-XL) 25 MG 24 HR TABLET    Take ½ tablet (12.5 mg total) by mouth once daily.    ONDANSETRON (ZOFRAN) 4 MG TABLET    1 tablet as needed for nausea Orally every 8 hours 7 days    POLYETHYLENE GLYCOL (MIRALAX) 17 GRAM/DOSE POWDER        POTASSIUM CHLORIDE (MICRO-K) 10 MEQ CPSR    Take 2 capsules (20 mEq total) by mouth once daily.    PREDNISONE (DELTASONE) 20 MG TABLET    Take 2 tablets (40 mg total) by mouth once daily. for 5 days    SENNA-DOCUSATE 8.6-50 MG (PERICOLACE) 8.6-50 MG PER TABLET    1 tablet Orally Twice a day    TRAZODONE (DESYREL) 50 MG TABLET    Take 0.5 tablets (25 mg total) by mouth nightly as needed for Insomnia (Anxiety or insomnia).    TRIAMCINOLONE ACETONIDE 0.1% (KENALOG) 0.1 % CREAM    Apply externally to affected area twice a day until clear    VITAMIN D (VITAMIN D3) 1000 UNITS TAB    Take 2 tablets (2,000 Units total) by mouth once daily.       PMH:   Past Medical History:   Diagnosis Date    Acute biliary pancreatitis 07/27/2024    Anemia 07/12/2024    Aortic atherosclerosis     Arthritis     knee joint pain    Asthma-COPD overlap syndrome 08/22/2022    home o2    Breast cancer 2002    left breast & lymph nodes-s/p sx with chemo    Cataracts, bilateral     Chronic diastolic heart failure 12/24/2019    Chronic kidney disease, stage 3     Class 1 obesity due to  excess calories with serious comorbidity and body mass index (BMI) of 30.0 to 30.9 in adult 05/16/2024    History of chemotherapy     last treatment 12/2002 (had 8 treatments)    Hyperlipidemia 11/20/2016    Hypertension     Lymphedema of both lower extremities 01/25/2022    Nephrolithiasis 06/02/2014    Osteoporosis     Paroxysmal atrial fibrillation 06/07/2021    Renal osteodystrophy 01/14/2016    Venous stasis dermatitis of both lower extremities 01/25/2022    Vitamin D insufficiency      PSH:   Past Surgical History:   Procedure Laterality Date    ABLATION, ATRIAL FLUTTER, TYPICAL N/A 6/17/2024    Procedure: Ablation, Atrial Flutter, Typical;  Surgeon: Taz Church MD;  Location: [facility] EP LAB;  Service: Cardiology;  Laterality: N/A;  AFL, RFA, LORENE, MAKAYLA (cx if SR), anes, MB, 3 Prep    BREAST BIOPSY Left 2002    core bx, +    BREAST BIOPSY Right 2018    core    BREAST BIOPSY Right 2019    BREAST LUMPECTOMY Left 2002    CYSTOSCOPY  4/28/2022    Procedure: CYSTOSCOPY;  Surgeon: Vik Ware MD;  Location: [facility] OR 1ST FLR;  Service: Urology;;    CYSTOSCOPY  5/30/2022    Procedure: CYSTOSCOPY;  Surgeon: Bonnie Knutson MD;  Location: [facility] OR 1ST FLR;  Service: Urology;;    ECHOCARDIOGRAM,TRANSESOPHAGEAL N/A 6/17/2024    Procedure: Transesophageal echo (MAKAYLA) intra-procedure log documentation;  Surgeon: Nestor Martinez MD;  Location: [facility] EP LAB;  Service: Cardiology;  Laterality: N/A;    ERCP N/A 7/30/2024    Procedure: ERCP (ENDOSCOPIC RETROGRADE CHOLANGIOPANCREATOGRAPHY);  Surgeon: Sierra Cotto MD;  Location: [facility] ENDO (2ND FLR);  Service: Endoscopy;  Laterality: N/A;    LASER LITHOTRIPSY  5/30/2022    Procedure: LITHOTRIPSY, USING LASER;  Surgeon: Bonnie Knutson MD;  Location: [facility] OR 1ST FLR;  Service: Urology;;    LITHOTRIPSY      MASTECTOMY Left 06/2002    left-& lymph node dissection    REPLACEMENT OF STENT Right 5/30/2022    Procedure: REPLACEMENT, STENT;  Surgeon: Bonnie RECINOS  "MD Eamon;  Location: [facility] OR [address] [place] [address];  Service: Urology;  Laterality: Right;    RETROGRADE PYELOGRAPHY Right 4/28/2022    Procedure: PYELOGRAM, RETROGRADE;  Surgeon: Vik Ware MD;  [facility];  Service: Urology;  Laterality: Right;    ROBOT-ASSISTED LAPAROSCOPIC PARTIAL NEPHRECTOMY USING DA JESÚS XI Right 3/11/2021    Procedure: XI ROBOTIC NEPHRECTOMY, PARTIAL;  Surgeon: Taz Ortega MD;  Location: [facility] OR 2ND FLR;  Service: Urology;  Laterality: Right;  4hr/ gen with regional  [facility] confirmation:  [id number] for 11:15am case NC    URETEROSCOPIC REMOVAL OF URETERIC CALCULUS Right 5/30/2022    Procedure: REMOVAL, CALCULUS, URETER, URETEROSCOPIC;  Surgeon: Bonnie Knutson MD;  Location: [facility] OR [address] [place] [address];  Service: Urology;  Laterality: Right;    ureteroscopy Bilateral     6.14    URETEROSCOPY Right 5/30/2022    Procedure: URETEROSCOPY;  Surgeon: Bonnie Knutson MD;  Location: [facility] OR [address] [place] [address];  Service: Urology;  Laterality: Right;     Allergies: She is allergic to adhesive tape-silicones and adhesive.  Social History: Marital Status: . She  reports that she quit smoking about 15 years ago. Her smoking use included cigarettes. She started smoking about 65 years ago. She has a 50 pack-year smoking history. She has quit using smokeless tobacco.. She  reports no history of alcohol use..       Exam:  VITAL SIGNS:   Vitals:    01/29/25 1637 01/29/25 2029   BP: (!) 188/99 (!) 185/88   BP Location:  Right arm   Patient Position:  Sitting   Pulse: 92 104   Resp: (!) 22    Temp: 98.8 °F (37.1 °C) 96.4 °F (35.8 °C)   TempSrc: Oral Oral   SpO2: 96% (!) 90%   Weight: 66.2 kg (146 lb)    Height: 5' 4" (1.626 m)      Const: Awake and alert, NAD   Head: Atraumatic  Eyes: Normal Conjunctiva  ENT: Normal External Ears, Nose and Mouth.  Neck: Full range of motion. No meningismus.  Resp: Normal respiratory effort, No distress, CTAB  Cardio: Equal and intact distal pulses, " RRR  Abd: Soft, non tender, non distended.   Skin: No petechiae or rashes  Ext: 1+ pitting edema BLE  Neur: Awake and alert  Psych: Normal Mood and Affect    Data:  Results for orders placed or performed during the hospital encounter of 01/29/25   Hepatitis C Antibody    Collection Time: 01/29/25  5:58 PM   Result Value Ref Range    Hepatitis C Ab Non-reactive Non-reactive   HIV 1/2 Ag/Ab (4th Gen)    Collection Time: 01/29/25  5:58 PM   Result Value Ref Range    HIV 1/2 Ag/Ab Non-reactive Non-reactive   Brain natriuretic peptide    Collection Time: 01/29/25  5:58 PM   Result Value Ref Range    BNP 2,548 (H) 0 - 99 pg/mL   CBC auto differential    Collection Time: 01/29/25  5:58 PM   Result Value Ref Range    WBC 6.77 3.90 - 12.70 K/uL    RBC 4.56 4.00 - 5.40 M/uL    Hemoglobin 12.2 12.0 - 16.0 g/dL    Hematocrit 38.4 37.0 - 48.5 %    MCV 84 82 - 98 fL    MCH 26.8 (L) 27.0 - 31.0 pg    MCHC 31.8 (L) 32.0 - 36.0 g/dL    RDW 19.4 (H) 11.5 - 14.5 %    Platelets 213 150 - 450 K/uL    MPV 9.2 9.2 - 12.9 fL    Immature Granulocytes 0.4 0.0 - 0.5 %    Gran # (ANC) 5.9 1.8 - 7.7 K/uL    Immature Grans (Abs) 0.03 0.00 - 0.04 K/uL    Lymph # 0.7 (L) 1.0 - 4.8 K/uL    Mono # 0.1 (L) 0.3 - 1.0 K/uL    Eos # 0.0 0.0 - 0.5 K/uL    Baso # 0.04 0.00 - 0.20 K/uL    nRBC 0 0 /100 WBC    Gran % 87.8 (H) 38.0 - 73.0 %    Lymph % 9.6 (L) 18.0 - 48.0 %    Mono % 1.3 (L) 4.0 - 15.0 %    Eosinophil % 0.3 0.0 - 8.0 %    Basophil % 0.6 0.0 - 1.9 %    Differential Method Automated    Comprehensive metabolic panel    Collection Time: 01/29/25  5:58 PM   Result Value Ref Range    Sodium 143 136 - 145 mmol/L    Potassium 3.6 3.5 - 5.1 mmol/L    Chloride 109 95 - 110 mmol/L    CO2 21 (L) 23 - 29 mmol/L    Glucose 140 (H) 70 - 110 mg/dL    BUN 16 8 - 23 mg/dL    Creatinine 1.2 0.5 - 1.4 mg/dL    Calcium 10.5 8.7 - 10.5 mg/dL    Total Protein 7.4 6.0 - 8.4 g/dL    Albumin 3.0 (L) 3.5 - 5.2 g/dL    Total Bilirubin 0.7 0.1 - 1.0 mg/dL    Alkaline  Phosphatase 129 40 - 150 U/L    AST 38 10 - 40 U/L    ALT 21 10 - 44 U/L    eGFR 45.5 (A) >60 mL/min/1.73 m^2    Anion Gap 13 8 - 16 mmol/L   Troponin I High Sensitivity    Collection Time: 01/29/25  5:58 PM   Result Value Ref Range    Troponin I High Sensitivity 19 (H) 0 - 14 ng/L   Influenza A & B by Molecular    Collection Time: 01/29/25  5:59 PM    Specimen: Nasopharyngeal Swab   Result Value Ref Range    Influenza A, Molecular Invalid Negative    Influenza B, Molecular Invalid Negative    Flu A & B Source Nasal swab    COVID-19 Rapid Screening    Collection Time: 01/29/25  5:59 PM   Result Value Ref Range    SARS-CoV-2 RNA, Amplification, Qual Negative Negative   ISTAT PROCEDURE    Collection Time: 01/29/25  6:01 PM   Result Value Ref Range    POC PH 7.524 (H) 7.35 - 7.45    POC PCO2 30.3 (L) 35 - 45 mmHg    POC PO2 45 40 - 60 mmHg    POC HCO3 24.9 24 - 28 mmol/L    POC BE 2 -2 to 2 mmol/L    POC SATURATED O2 86 95 - 100 %    POC TCO2 26 24 - 29 mmol/L    Sample VENOUS     Site Other     Allens Test N/A     DelSys Aero Mask     Mode SPONT    Hemoglobin A1c    Collection Time: 01/30/25  3:57 AM   Result Value Ref Range    Hemoglobin A1C 4.9 4.0 - 5.6 %    Estimated Avg Glucose 94 68 - 131 mg/dL   Magnesium    Collection Time: 01/30/25  3:57 AM   Result Value Ref Range    Magnesium 2.0 1.6 - 2.6 mg/dL   Lactic acid, plasma    Collection Time: 01/30/25  3:57 AM   Result Value Ref Range    Lactate (Lactic Acid) 2.1 0.5 - 2.2 mmol/L   Basic metabolic panel    Collection Time: 01/30/25  3:57 AM   Result Value Ref Range    Sodium 144 136 - 145 mmol/L    Potassium 3.8 3.5 - 5.1 mmol/L    Chloride 109 95 - 110 mmol/L    CO2 26 23 - 29 mmol/L    Glucose 134 (H) 70 - 110 mg/dL    BUN 20 8 - 23 mg/dL    Creatinine 1.2 0.5 - 1.4 mg/dL    Calcium 10.1 8.7 - 10.5 mg/dL    Anion Gap 9 8 - 16 mmol/L    eGFR 45.5 (A) >60 mL/min/1.73 m^2     *Note: Due to a large number of results and/or encounters for the requested time period,  some results have not been displayed. A complete set of results can be found in Results Review.     Imaging Results              X-Ray Chest AP Portable (Final result)  Result time 25 00:59:04      Final result by Uche Staton MD (25 00:59:04)                   Impression:      Bilateral large pleural effusions with compressive atelectasis and lobar consolidation.  Differential includes effusions with parapneumonic atelectasis is versus pneumonia with parapneumonic effusion or empyema.  Correlation needed.      Electronically signed by: Uche Staton  Date:    2025  Time:    00:59               Narrative:    EXAMINATION:  XR CHEST AP PORTABLE    CLINICAL HISTORY:  SOB;    TECHNIQUE:  Single frontal view of the chest was performed.    COMPARISON:  Is 2024    FINDINGS:  Is perihilar ground-glass infiltrates appear to have resolved.    There is bilateral effusions with adjacent compressive atelectasis suggested.  The upper lungs are clear.  The heart remains stable in size.  Air bronchograms are noted in the medial basilar segments of both lower lobes.                                    12-LEAD EKG INTERPRETATION BY ME:  Rate/Rhythm: Normal Sinus Rhythm with rate of 97 beats per minute  QRS, ST, T-waves: RBBB  Impression: NSR, RBBB    Labs & Imaging studies were reviewed independently by me.     Medical Decision Makin-year-old female presenting to the emergency department with shortness of breath.  Her oxygen got bumped up to 5 L by her pulmonologist yesterday, now needing 10 L, due to acute hypoxia in the emergency room.  She was switched over to bubble flow and weaned down to 5 L.  Although her pulmonologist thought it was more a COPD exacerbation and started prednisone and doxycycline, on exam in the emergency department she does not have any wheezes on examination.  She is not hypercapnic on VBG, and reports her DuoNebs have not really been helping.  Her BNP is elevated at  2500, and she has pedal edema, I suspect that her symptoms are more likely due to a CHF exacerbation.  She was started on Lasix for diuresis.  Troponin slightly elevated, will trend.  No history of infectious symptoms, doubt pneumonia or viral infection.  Patient admitted to Hospital Medicine for further evaluation and treatment.    Critical Care Time: 35 minutes  Treatments/Evaluations: Close monitoring and treatment of unstable vital signs, cardiorespiratory, and neurologic status, while maintaining tight balance of fluid, respiratory, and cardiac interventions. This time includes discussing the case with the patient and the patients family. This time does not include all procedures stated elsewhere in this record. This time also includes reviewing old records, labs and radiological studies. This time includes examining and re-examining the patient. Additionally, this time also includes arranging care with admitting and consulting physicians.     Clinical Impression:  1. Shortness of breath    2. Acute exacerbation of CHF (congestive heart failure)                 Radha Higginbotham MD  01/30/25 0729

## 2025-01-30 NOTE — PROGRESS NOTES
2:05 Second attempted to enroll PT in the [facility]. PT is off the Floor presently, will return tomorrow.

## 2025-01-30 NOTE — ASSESSMENT & PLAN NOTE
Patient's blood pressure range in the last 24 hours was: BP  Min: 146/92  Max: 188/99.The patient's inpatient anti-hypertensive regimen is listed below:  Current Antihypertensives  metoprolol succinate (TOPROL-XL) 24 hr split tablet 12.5 mg, Daily, Oral  furosemide injection 40 mg, Every 12 hours, Intravenous    Plan  - BP is controlled, no changes needed to their regimen

## 2025-01-30 NOTE — CARE UPDATE
"RAPID RESPONSE NURSE CHART REVIEW        Chart Reviewed: 01/30/2025, 11:33 AM    MRN: [id number]  Bed: 326/326 A    Dx: Acute on chronic diastolic heart failure    Misa Barajas has a past medical history of Acute biliary pancreatitis, Anemia, Aortic atherosclerosis, Arthritis, Asthma-COPD overlap syndrome, Breast cancer, Cataracts, bilateral, Chronic diastolic heart failure, Chronic kidney disease, stage 3, Class 1 obesity due to excess calories with serious comorbidity and body mass index (BMI) of 30.0 to 30.9 in adult, History of chemotherapy, Hyperlipidemia, Hypertension, Lymphedema of both lower extremities, Nephrolithiasis, Osteoporosis, Paroxysmal atrial fibrillation, Renal osteodystrophy, Venous stasis dermatitis of both lower extremities, and Vitamin D insufficiency.    Last VS: BP (!) 158/82 (BP Location: Left arm, Patient Position: Lying)   Pulse 94   Temp 98.4 °F (36.9 °C)   Resp 18   Ht 5' 3" (1.6 m)   Wt 65.4 kg (144 lb 2.9 oz)   SpO2 (!) 94%   Breastfeeding No   BMI 25.54 kg/m²     24H Vital Sign Range:  Temp:  [96.4 °F (35.8 °C)-98.8 °F (37.1 °C)]   Pulse:  []   Resp:  [16-33]   BP: (142-188)/(74-99)   SpO2:  [90 %-98 %]     Level of Consciousness (AVPU): alert    Recent Labs     01/29/25  1758   WBC 6.77   HGB 12.2   HCT 38.4          Recent Labs     01/29/25  1758 01/30/25  0357    144   K 3.6 3.8    109   CO2 21* 26   BUN 16 20   CREATININE 1.2 1.2   * 134*   MG  --  2.0        Recent Labs     01/29/25  1801   PH 7.524*   PCO2 30.3*   PO2 45   HCO3 24.9   POCSATURATED 86   BE 2        OXYGEN:  Flow (L/min) (Oxygen Therapy): 5          MEWS score: 1    Rounding completed at 1016.    Rounding completed with charge GINGER Chaparro  reports patient being diuresed on 5L NC. No acute concerns verbalized at this time. Instructed to call [phone] for further concerns or assistance.    Cal Martinez RN      "

## 2025-01-30 NOTE — PLAN OF CARE
Plan of care reviewed with patient. Patient able to verbalize understanding of care. Patient remained free of falls/injuries, fall precaution remain in place. Patient is resting comfortably with no complaints or questions at this time. Bed in lowest position, call bell within reach.  Problem: Adult Inpatient Plan of Care  Goal: Plan of Care Review  Outcome: Progressing  Goal: Patient-Specific Goal (Individualized)  Outcome: Progressing  Goal: Absence of Hospital-Acquired Illness or Injury  Outcome: Progressing  Goal: Optimal Comfort and Wellbeing  Outcome: Progressing  Goal: Readiness for Transition of Care  Outcome: Progressing     Problem: Wound  Goal: Optimal Coping  Outcome: Progressing  Goal: Optimal Functional Ability  Outcome: Progressing  Goal: Absence of Infection Signs and Symptoms  Outcome: Progressing  Goal: Improved Oral Intake  Outcome: Progressing  Goal: Optimal Pain Control and Function  Outcome: Progressing  Goal: Skin Health and Integrity  Outcome: Progressing  Goal: Optimal Wound Healing  Outcome: Progressing     Problem: Skin Injury Risk Increased  Goal: Skin Health and Integrity  Outcome: Progressing     Problem: Heart Failure  Goal: Optimal Coping  Outcome: Progressing  Goal: Optimal Cardiac Output  Outcome: Progressing  Goal: Stable Heart Rate and Rhythm  Outcome: Progressing  Goal: Optimal Functional Ability  Outcome: Progressing  Goal: Fluid and Electrolyte Balance  Outcome: Progressing  Goal: Improved Oral Intake  Outcome: Progressing  Goal: Effective Oxygenation and Ventilation  Outcome: Progressing  Goal: Effective Breathing Pattern During Sleep  Outcome: Progressing     Problem: Hypertension Acute  Goal: Blood Pressure Within Desired Range  Outcome: Progressing

## 2025-01-31 ENCOUNTER — DOCUMENTATION ONLY (OUTPATIENT)
Dept: CARDIOLOGY | Facility: CLINIC | Age: 80
End: 2025-01-31
Payer: MEDICARE

## 2025-01-31 ENCOUNTER — PATIENT MESSAGE (OUTPATIENT)
Dept: CARDIOLOGY | Facility: CLINIC | Age: 80
End: 2025-01-31
Payer: MEDICARE

## 2025-01-31 DIAGNOSIS — R06.02 SOB (SHORTNESS OF BREATH): Primary | ICD-10-CM

## 2025-01-31 LAB
ANION GAP SERPL CALC-SCNC: 10 MMOL/L (ref 8–16)
BUN SERPL-MCNC: 36 MG/DL (ref 8–23)
CALCIUM SERPL-MCNC: 10.3 MG/DL (ref 8.7–10.5)
CHLORIDE SERPL-SCNC: 110 MMOL/L (ref 95–110)
CO2 SERPL-SCNC: 27 MMOL/L (ref 23–29)
CREAT SERPL-MCNC: 1.3 MG/DL (ref 0.5–1.4)
EST. GFR  (NO RACE VARIABLE): 41.3 ML/MIN/1.73 M^2
GLUCOSE SERPL-MCNC: 95 MG/DL (ref 70–110)
POTASSIUM SERPL-SCNC: 3.5 MMOL/L (ref 3.5–5.1)
SODIUM SERPL-SCNC: 147 MMOL/L (ref 136–145)

## 2025-01-31 PROCEDURE — 99900035 HC TECH TIME PER 15 MIN (STAT)

## 2025-01-31 PROCEDURE — 80048 BASIC METABOLIC PNL TOTAL CA: CPT | Performed by: INTERNAL MEDICINE

## 2025-01-31 PROCEDURE — 94640 AIRWAY INHALATION TREATMENT: CPT

## 2025-01-31 PROCEDURE — 20600001 HC STEP DOWN PRIVATE ROOM

## 2025-01-31 PROCEDURE — 25000003 PHARM REV CODE 250: Performed by: INTERNAL MEDICINE

## 2025-01-31 PROCEDURE — 27000221 HC OXYGEN, UP TO 24 HOURS

## 2025-01-31 PROCEDURE — 25000242 PHARM REV CODE 250 ALT 637 W/ HCPCS: Performed by: INTERNAL MEDICINE

## 2025-01-31 PROCEDURE — 63600175 PHARM REV CODE 636 W HCPCS: Performed by: INTERNAL MEDICINE

## 2025-01-31 PROCEDURE — 36415 COLL VENOUS BLD VENIPUNCTURE: CPT | Performed by: INTERNAL MEDICINE

## 2025-01-31 PROCEDURE — 94761 N-INVAS EAR/PLS OXIMETRY MLT: CPT

## 2025-01-31 RX ORDER — FUROSEMIDE 10 MG/ML
20 INJECTION INTRAMUSCULAR; INTRAVENOUS EVERY 12 HOURS
Status: DISCONTINUED | OUTPATIENT
Start: 2025-01-31 | End: 2025-02-01

## 2025-01-31 RX ORDER — POTASSIUM CHLORIDE 20 MEQ/1
40 TABLET, EXTENDED RELEASE ORAL ONCE
Status: COMPLETED | OUTPATIENT
Start: 2025-01-31 | End: 2025-01-31

## 2025-01-31 RX ORDER — AMLODIPINE BESYLATE 2.5 MG/1
2.5 TABLET ORAL DAILY
Status: DISCONTINUED | OUTPATIENT
Start: 2025-01-31 | End: 2025-02-02

## 2025-01-31 RX ORDER — CLONIDINE HYDROCHLORIDE 0.1 MG/1
0.1 TABLET ORAL EVERY 8 HOURS PRN
Status: DISCONTINUED | OUTPATIENT
Start: 2025-01-31 | End: 2025-02-03 | Stop reason: HOSPADM

## 2025-01-31 RX ADMIN — LEVALBUTEROL 1.25 MG: 1.25 SOLUTION, CONCENTRATE RESPIRATORY (INHALATION) at 11:01

## 2025-01-31 RX ADMIN — ASPIRIN 81 MG: 81 TABLET, COATED ORAL at 08:01

## 2025-01-31 RX ADMIN — DOXYCYCLINE HYCLATE 100 MG: 100 TABLET, COATED ORAL at 09:01

## 2025-01-31 RX ADMIN — LEVALBUTEROL 1.25 MG: 1.25 SOLUTION, CONCENTRATE RESPIRATORY (INHALATION) at 08:01

## 2025-01-31 RX ADMIN — FUROSEMIDE 60 MG: 10 INJECTION, SOLUTION INTRAMUSCULAR; INTRAVENOUS at 09:01

## 2025-01-31 RX ADMIN — CLONIDINE HYDROCHLORIDE 0.1 MG: 0.1 TABLET ORAL at 09:01

## 2025-01-31 RX ADMIN — POTASSIUM CHLORIDE 40 MEQ: 1500 TABLET, EXTENDED RELEASE ORAL at 08:01

## 2025-01-31 RX ADMIN — LEVALBUTEROL 1.25 MG: 1.25 SOLUTION, CONCENTRATE RESPIRATORY (INHALATION) at 12:01

## 2025-01-31 RX ADMIN — BUSPIRONE HYDROCHLORIDE 5 MG: 5 TABLET ORAL at 08:01

## 2025-01-31 RX ADMIN — ENOXAPARIN SODIUM 40 MG: 40 INJECTION SUBCUTANEOUS at 04:01

## 2025-01-31 RX ADMIN — PREDNISONE 60 MG: 50 TABLET ORAL at 08:01

## 2025-01-31 RX ADMIN — METOPROLOL SUCCINATE 12.5 MG: 25 TABLET, EXTENDED RELEASE ORAL at 08:01

## 2025-01-31 RX ADMIN — FUROSEMIDE 20 MG: 10 INJECTION, SOLUTION INTRAMUSCULAR; INTRAVENOUS at 09:01

## 2025-01-31 RX ADMIN — LEVALBUTEROL 1.25 MG: 1.25 SOLUTION, CONCENTRATE RESPIRATORY (INHALATION) at 03:01

## 2025-01-31 RX ADMIN — AMLODIPINE BESYLATE 2.5 MG: 2.5 TABLET ORAL at 04:01

## 2025-01-31 NOTE — PHYSICIAN QUERY
Provider, Due to Documentation Conflict please        Clarify the Acuity of Hypoxic Respiratory Failure       Acute on chronic

## 2025-01-31 NOTE — PLAN OF CARE
Pt free of falls/trauma/injuries.  Denies c/o SOB, CP, or discomfort.  Generalized skin remains CDI ble edema noted.  Pt being diuresed with Lasix iv 60mg q12hrs; diuresing well.   Wt trending up / trending down / remains stable.  Pt tolerating plan of care.       Problem: Adult Inpatient Plan of Care  Goal: Plan of Care Review  Outcome: Progressing  Goal: Patient-Specific Goal (Individualized)  Outcome: Progressing  Goal: Absence of Hospital-Acquired Illness or Injury  Outcome: Progressing  Goal: Optimal Comfort and Wellbeing  Outcome: Progressing  Goal: Readiness for Transition of Care  Outcome: Progressing     Problem: Wound  Goal: Optimal Coping  Outcome: Progressing  Goal: Optimal Functional Ability  Outcome: Progressing  Goal: Absence of Infection Signs and Symptoms  Outcome: Progressing  Goal: Improved Oral Intake  Outcome: Progressing  Goal: Optimal Pain Control and Function  Outcome: Progressing  Goal: Skin Health and Integrity  Outcome: Progressing  Goal: Optimal Wound Healing  Outcome: Progressing     Problem: Skin Injury Risk Increased  Goal: Skin Health and Integrity  Outcome: Progressing     Problem: Heart Failure  Goal: Optimal Coping  Outcome: Progressing  Goal: Optimal Cardiac Output  Outcome: Progressing  Goal: Stable Heart Rate and Rhythm  Outcome: Progressing  Goal: Optimal Functional Ability  Outcome: Progressing  Goal: Fluid and Electrolyte Balance  Outcome: Progressing  Goal: Improved Oral Intake  Outcome: Progressing  Goal: Effective Oxygenation and Ventilation  Outcome: Progressing  Goal: Effective Breathing Pattern During Sleep  Outcome: Progressing     Problem: Hypertension Acute  Goal: Blood Pressure Within Desired Range  Outcome: Progressing

## 2025-01-31 NOTE — PLAN OF CARE
Problem: Adult Inpatient Plan of Care  Goal: Plan of Care Review  Outcome: Progressing  Goal: Patient-Specific Goal (Individualized)  Outcome: Progressing  Goal: Absence of Hospital-Acquired Illness or Injury  Outcome: Progressing  Goal: Optimal Comfort and Wellbeing  Outcome: Progressing  Goal: Readiness for Transition of Care  Outcome: Progressing     Problem: Wound  Goal: Optimal Coping  Outcome: Progressing  Goal: Optimal Functional Ability  Outcome: Progressing  Goal: Absence of Infection Signs and Symptoms  Outcome: Progressing  Goal: Improved Oral Intake  Outcome: Progressing  Goal: Optimal Pain Control and Function  Outcome: Progressing  Goal: Skin Health and Integrity  Outcome: Progressing  Goal: Optimal Wound Healing  Outcome: Progressing     Problem: Skin Injury Risk Increased  Goal: Skin Health and Integrity  Outcome: Progressing     Problem: Heart Failure  Goal: Optimal Coping  Outcome: Progressing  Goal: Optimal Cardiac Output  Outcome: Progressing  Goal: Stable Heart Rate and Rhythm  Outcome: Progressing  Goal: Optimal Functional Ability  Outcome: Progressing  Goal: Fluid and Electrolyte Balance  Outcome: Progressing  Goal: Improved Oral Intake  Outcome: Progressing  Goal: Effective Oxygenation and Ventilation  Outcome: Progressing  Goal: Effective Breathing Pattern During Sleep  Outcome: Progressing     Problem: Hypertension Acute  Goal: Blood Pressure Within Desired Range  Outcome: Progressing

## 2025-01-31 NOTE — CARE UPDATE
Sodium of 147 and BUN of 36.  We will reduce Lasix to 20 mg b.i.d. given elevated blood pressures, we will start low dose amlodipine and p.r.n. antihypertensives

## 2025-01-31 NOTE — ASSESSMENT & PLAN NOTE
Patient with Hypoxic Respiratory failure which is Acute on chronic.  she is on home oxygen at 2 LPM. Supplemental oxygen was provided and noted-  10L    .   Signs/symptoms of respiratory failure include- increased work of breathing. Contributing diagnoses includes - CHF and COPD Labs and images were reviewed. Patient Has not had a recent ABG. Will treat underlying causes and adjust management of respiratory failure as follows- started CHF pathway, will continue doxy and steroids    1/31  Acute on chronic respiratory failure.  No reports of heart failure on echocardiogram however elevated pulmonary pressures and CVP.  Patient reports that she has a pulmonologist as an outpatient, continue to follow up.  We will continue with diuresis Lasix 60 mg IV b.i.d. and treatment for COPD exacerbation.  Continue to wean oxygen accordingly

## 2025-01-31 NOTE — PLAN OF CARE
Plan of care discussed with patient. Patient is free of fall/trauma/injury. Denies CP, SOB, or pain/discomfort. All questions addressed. Will continue to monitor. AAOx4 and VSS. Pt on purewick. Call light in reach, bed locked in lowest position.   Problem: Adult Inpatient Plan of Care  Goal: Plan of Care Review  Outcome: Progressing  Goal: Patient-Specific Goal (Individualized)  Outcome: Progressing  Goal: Absence of Hospital-Acquired Illness or Injury  Outcome: Progressing  Goal: Optimal Comfort and Wellbeing  Outcome: Progressing  Goal: Readiness for Transition of Care  Outcome: Progressing     Problem: Wound  Goal: Optimal Coping  Outcome: Progressing  Goal: Optimal Functional Ability  Outcome: Progressing  Goal: Absence of Infection Signs and Symptoms  Outcome: Progressing  Goal: Improved Oral Intake  Outcome: Progressing  Goal: Optimal Pain Control and Function  Outcome: Progressing  Goal: Skin Health and Integrity  Outcome: Progressing  Goal: Optimal Wound Healing  Outcome: Progressing     Problem: Skin Injury Risk Increased  Goal: Skin Health and Integrity  Outcome: Progressing     Problem: Heart Failure  Goal: Optimal Coping  Outcome: Progressing  Goal: Optimal Cardiac Output  Outcome: Progressing  Goal: Stable Heart Rate and Rhythm  Outcome: Progressing  Goal: Optimal Functional Ability  Outcome: Progressing  Goal: Fluid and Electrolyte Balance  Outcome: Progressing  Goal: Improved Oral Intake  Outcome: Progressing  Goal: Effective Oxygenation and Ventilation  Outcome: Progressing  Goal: Effective Breathing Pattern During Sleep  Outcome: Progressing     Problem: Hypertension Acute  Goal: Blood Pressure Within Desired Range  Outcome: Progressing

## 2025-01-31 NOTE — PROGRESS NOTES
"[facility]) nurse navigator notified of HFTCC candidate in need of education and introduction to 4-6 week program.      PT aao x 3 while lying in bed. Introduced self to pt as HFT[facility] nurse navigator.     Patient given "Heart Failure [facility]itional [facility] Clinic Pamphlet". "Home Care Guide" which includes "Daily weight and symptom tracker" will be given @ [facility] appt .  Encouraged pt to review information.      Reviewed the following key points of HFT[facility] program with pt.                 1.) Take your medications as directed. Call Ms Ray if any health Care Professional changes your Heart/Fluid medications.               2.) Weight yourself daily. Daily Dry Weights. Upon waking ,empty your bladder, weigh with as little clothes as possible before eating/drinking. Record weight in Symptom Tracker and compare these weights for fluid gain. Weight gain overnight of 3-4 lbs is Fluid, also a gain of 5 lbs in 3 days is Fluid as well. Call US!              3.) Follow low salt and limited fluid diet. Salt/Sodium Restriction 6510-8825 mg, see page 4. Sodium makes you hold onto Fluid. High Sodium Foods;Deli Meat/Cheese, Reviewed the following key points of HFT[facility] program with pt:  Sausages/Hot Dogs, Fast Food/Restaurant Food, Anything in a box, bottle or can. Cook with Fresh or Frozen ingredients and use seasonings that are labeled NO SALT. Check Portion Sizes, Salt is reported for 1 portion. A can may contain 2-3 portions. Fluid Restriction 1.5 -2 Liters/Day, see page 6, measure all of your Fluid, the milk in your cereal, broth in your soup and ice cream because anything that melts at room temp is a liquid.              4.) Stop Reviewed the following key points of HFTCC program with pt and family:  smoking and start exercising. Brisk walking is good, don't walk like you are going to the [employer] and DON"T WALK IN THE HEAT! Start low and increase as tolerated. Remember if you don't use it you lose it.   "            5.) Go to your appointments and call your team. Have your weight and BP/P ready when we call to do the Phone Check ins and call us if you have fluid gain or questions.    Pt reminded to follow Symptom tracker and to call at the onset of symptoms according to tracker.     Reviewed plan for follow up once discharged to include phone calls, in person and virtual visits to assist pt optimizing their heart failure medication regimen and encouraging healthy lifestyle modifications.  Reminded pt that program will assist them over the next 4-6 weeks and then patient will be transferred to long term care provider .  Reminded pt how to contact HFTCC navigator via phone and or via [employer].     Pt instructed appointment will be printed on hospital discharge paperwork.     Pt also reminded RN will call 48-72 hours after discharge to check on them.     PT can verbalize read back of some of the information given.  Encouraged pt to read over information often and contact team with any questions or concerns.     PT states that I will need to call her Daughters to see if they want her in this Clinic. PT states to call Oldest daughter Lucero. Spoke to Marina and gave the same Hospital Education as above and will e-mail the Educational materials to her @ [email or web address]. Lucero requests Labs @ 0930 & appt @ 1000 but not on Monday or Friday please.

## 2025-01-31 NOTE — PROGRESS NOTES
[facility] - Cardiology [facility] Medicine  Progress Note    Patient Name: Misa Barajas  MRN: [id number]  Patient Class: IP- Inpatient   Admission Date: 1/29/2025  Length of Stay: 2 days  Attending Physician: Uche Antunez MD  Primary Care Provider: Isela Langston MD        Subjective     Principal Problem:Chronic respiratory failure with hypoxia        HPI:  80 yo female with COPD on 2L chronically, CHF, CKD, HTN, presenting for shortness of breath and wheezing for the past 2 days. She has been at home trying to fight it off she was able to turn up her oxygen to 5L at home and was taking duonebs at home. She called her pulmonologist who started her on doxy and prednisone. She had a prolonged stay last year for similar symptoms so she did not want to come to the hospital but eventually did come whenever she was not feeling any better. In the ER, she was placed on a non re-breather, which improved her symptoms.     In the ER, Bnp noted to be elevated to 2500, troponin elevated to 19, CXR with fluid. Lasix given. Medicine called for admission.     Overview/Hospital Course:  No notes on file    Interval History:  Patient feels better.  No new complaints at this time, down to 4 L of oxygen.  Urine output noted    Review of Systems   Unable to perform ROS: Other     Objective:     Vital Signs (Most Recent):  Temp: 98.1 °F (36.7 °C) (01/31/25 0744)  Pulse: 81 (01/31/25 0829)  Resp: 18 (01/31/25 0829)  BP: (!) 162/76 (01/31/25 0744)  SpO2: 95 % (01/31/25 0829) Vital Signs (24h Range):  Temp:  [97.8 °F (36.6 °C)-98.4 °F (36.9 °C)] 98.1 °F (36.7 °C)  Pulse:  [77-96] 81  Resp:  [18] 18  SpO2:  [93 %-99 %] 95 %  BP: (153-162)/(68-82) 162/76     Weight: 65.4 kg (144 lb 2.9 oz)  Body mass index is 25.54 kg/m².    Intake/Output Summary (Last 24 hours) at 1/31/2025 0859  Last data filed at 1/31/2025 0814  Gross per 24 hour   Intake 730 ml   Output 950 ml   Net -220 ml         Physical Exam  Constitutional:        General: She is not in acute distress.  HENT:      Head: Normocephalic.      Right Ear: External ear normal.      Left Ear: External ear normal.      Nose: Nose normal.      Comments: Nasal cannula  Cardiovascular:      Rate and Rhythm: Normal rate.   Pulmonary:      Breath sounds: Rales (few) present. No wheezing.   Abdominal:      Palpations: Abdomen is soft.      Tenderness: There is no abdominal tenderness.   Musculoskeletal:      Right lower leg: Edema present.      Left lower leg: Edema present.      Comments: Mild pedal edema   Neurological:      General: No focal deficit present.      Mental Status: She is alert and oriented to person, place, and time.             Significant Labs: All pertinent labs within the past 24 hours have been reviewed.    Significant Imaging: I have reviewed all pertinent imaging results/findings within the past 24 hours.    Assessment and Plan     * Chronic respiratory failure with hypoxia  Patient with Hypoxic Respiratory failure which is Acute on chronic.  she is on home oxygen at 2 LPM. Supplemental oxygen was provided and noted-  10L    .   Signs/symptoms of respiratory failure include- increased work of breathing. Contributing diagnoses includes - CHF and COPD Labs and images were reviewed. Patient Has not had a recent ABG. Will treat underlying causes and adjust management of respiratory failure as follows- started CHF pathway, will continue doxy and steroids    1/31  Acute on chronic respiratory failure.  No reports of heart failure on echocardiogram however elevated pulmonary pressures and CVP.  Patient reports that she has a pulmonologist as an outpatient, continue to follow up.  We will continue with diuresis Lasix 60 mg IV b.i.d. and treatment for COPD exacerbation.  Continue to wean oxygen accordingly    COPD (chronic obstructive pulmonary disease)  Patient's COPD is with exacerbation noted by continued dyspnea and worsening of baseline hypoxia currently.  Patient is  currently off COPD Pathway. Continue scheduled inhalers Steroids, Antibiotics, and Supplemental oxygen and monitor respiratory status closely.     Hyperlipidemia  Chronic, controlled, continue to monitor    Acute on chronic diastolic heart failure  - CHF pathway started  - uncontrolled  - s/s corresponding to heart failure exacerbation  - last ECHO with Results for orders placed during the hospital encounter of 08/26/24    Echo    Interpretation Summary    Left Ventricle: The left ventricle is normal in size. Normal wall thickness. There is concentric remodeling. There is normal systolic function with a visually estimated ejection fraction of 60 - 65%. There is normal diastolic function.    Right Ventricle: Normal right ventricular cavity size. Wall thickness is normal. Systolic function is normal.    Aortic Valve: The aortic valve is a trileaflet valve. There is mild aortic valve sclerosis.    Mitral Valve: There is moderate mitral annular calcification present.    Tricuspid Valve: There is mild regurgitation.    Pulmonary Artery: The estimated pulmonary artery systolic pressure is 34 mmHg.    IVC/SVC: Normal venous pressure at 3 mmHg.    - BNP elevated to >2500, troponin 19  - will continue diuresis with lasix 40mg IV BID  - strict I/Os, fluid restricted diet  - monitor daily weights, telemetry  - replace K and Mg above 4 and 2 resp  - monitor oxygenation  - Continue Beta Blocker and Furosemide               HTN (hypertension)  Patient's blood pressure range in the last 24 hours was: BP  Min: 146/92  Max: 188/99.The patient's inpatient anti-hypertensive regimen is listed below:  Current Antihypertensives  metoprolol succinate (TOPROL-XL) 24 hr split tablet 12.5 mg, Daily, Oral  furosemide injection 40 mg, Every 12 hours, Intravenous    Plan  - BP is controlled, no changes needed to their regimen        VTE Risk Mitigation (From admission, onward)           Ordered     enoxaparin injection 40 mg  Daily          01/29/25 2312     IP VTE HIGH RISK PATIENT  Once         01/29/25 2312     Place sequential compression device  Until discontinued         01/29/25 2312                    Discharge Planning   NOHEMI: 2/2/2025     Code Status: Full Code   Medical Readiness for Discharge Date:                            Uche Antunez MD  Department of Hospital Medicine   [facility] - Cardiology Stepdown

## 2025-01-31 NOTE — SUBJECTIVE & OBJECTIVE
Interval History:  Patient feels better.  No new complaints at this time, down to 4 L of oxygen.  Urine output noted    Review of Systems   Unable to perform ROS: Other     Objective:     Vital Signs (Most Recent):  Temp: 98.1 °F (36.7 °C) (01/31/25 0744)  Pulse: 81 (01/31/25 0829)  Resp: 18 (01/31/25 0829)  BP: (!) 162/76 (01/31/25 0744)  SpO2: 95 % (01/31/25 0829) Vital Signs (24h Range):  Temp:  [97.8 °F (36.6 °C)-98.4 °F (36.9 °C)] 98.1 °F (36.7 °C)  Pulse:  [77-96] 81  Resp:  [18] 18  SpO2:  [93 %-99 %] 95 %  BP: (153-162)/(68-82) 162/76     Weight: 65.4 kg (144 lb 2.9 oz)  Body mass index is 25.54 kg/m².    Intake/Output Summary (Last 24 hours) at 1/31/2025 0859  Last data filed at 1/31/2025 0814  Gross per 24 hour   Intake 730 ml   Output 950 ml   Net -220 ml         Physical Exam  Constitutional:       General: She is not in acute distress.  HENT:      Head: Normocephalic.      Right Ear: External ear normal.      Left Ear: External ear normal.      Nose: Nose normal.      Comments: Nasal cannula  Cardiovascular:      Rate and Rhythm: Normal rate.   Pulmonary:      Breath sounds: Rales (few) present. No wheezing.   Abdominal:      Palpations: Abdomen is soft.      Tenderness: There is no abdominal tenderness.   Musculoskeletal:      Right lower leg: Edema present.      Left lower leg: Edema present.      Comments: Mild pedal edema   Neurological:      General: No focal deficit present.      Mental Status: She is alert and oriented to person, place, and time.             Significant Labs: All pertinent labs within the past 24 hours have been reviewed.    Significant Imaging: I have reviewed all pertinent imaging results/findings within the past 24 hours.

## 2025-02-01 LAB
ANION GAP SERPL CALC-SCNC: 9 MMOL/L (ref 8–16)
BUN SERPL-MCNC: 45 MG/DL (ref 8–23)
CALCIUM SERPL-MCNC: 9.9 MG/DL (ref 8.7–10.5)
CHLORIDE SERPL-SCNC: 109 MMOL/L (ref 95–110)
CO2 SERPL-SCNC: 28 MMOL/L (ref 23–29)
CREAT SERPL-MCNC: 1.3 MG/DL (ref 0.5–1.4)
EST. GFR  (NO RACE VARIABLE): 41.3 ML/MIN/1.73 M^2
GLUCOSE SERPL-MCNC: 97 MG/DL (ref 70–110)
POTASSIUM SERPL-SCNC: 3.4 MMOL/L (ref 3.5–5.1)
SODIUM SERPL-SCNC: 146 MMOL/L (ref 136–145)

## 2025-02-01 PROCEDURE — 25000003 PHARM REV CODE 250: Performed by: INTERNAL MEDICINE

## 2025-02-01 PROCEDURE — 20600001 HC STEP DOWN PRIVATE ROOM

## 2025-02-01 PROCEDURE — 80048 BASIC METABOLIC PNL TOTAL CA: CPT | Performed by: INTERNAL MEDICINE

## 2025-02-01 PROCEDURE — 27000221 HC OXYGEN, UP TO 24 HOURS

## 2025-02-01 PROCEDURE — 36415 COLL VENOUS BLD VENIPUNCTURE: CPT | Performed by: INTERNAL MEDICINE

## 2025-02-01 PROCEDURE — 63600175 PHARM REV CODE 636 W HCPCS: Performed by: INTERNAL MEDICINE

## 2025-02-01 PROCEDURE — 94640 AIRWAY INHALATION TREATMENT: CPT

## 2025-02-01 PROCEDURE — 99900035 HC TECH TIME PER 15 MIN (STAT)

## 2025-02-01 PROCEDURE — 25000242 PHARM REV CODE 250 ALT 637 W/ HCPCS: Performed by: INTERNAL MEDICINE

## 2025-02-01 RX ORDER — POTASSIUM CHLORIDE 20 MEQ/1
40 TABLET, EXTENDED RELEASE ORAL ONCE
Status: COMPLETED | OUTPATIENT
Start: 2025-02-01 | End: 2025-02-01

## 2025-02-01 RX ORDER — FUROSEMIDE 40 MG/1
40 TABLET ORAL DAILY
Status: DISCONTINUED | OUTPATIENT
Start: 2025-02-02 | End: 2025-02-03 | Stop reason: HOSPADM

## 2025-02-01 RX ADMIN — ASPIRIN 81 MG: 81 TABLET, COATED ORAL at 08:02

## 2025-02-01 RX ADMIN — LEVALBUTEROL 1.25 MG: 1.25 SOLUTION, CONCENTRATE RESPIRATORY (INHALATION) at 09:02

## 2025-02-01 RX ADMIN — FUROSEMIDE 20 MG: 10 INJECTION, SOLUTION INTRAMUSCULAR; INTRAVENOUS at 09:02

## 2025-02-01 RX ADMIN — POTASSIUM CHLORIDE 40 MEQ: 1500 TABLET, EXTENDED RELEASE ORAL at 08:02

## 2025-02-01 RX ADMIN — ENOXAPARIN SODIUM 40 MG: 40 INJECTION SUBCUTANEOUS at 05:02

## 2025-02-01 RX ADMIN — DOXYCYCLINE HYCLATE 100 MG: 100 TABLET, COATED ORAL at 09:02

## 2025-02-01 RX ADMIN — DOXYCYCLINE HYCLATE 100 MG: 100 TABLET, COATED ORAL at 08:02

## 2025-02-01 RX ADMIN — METOPROLOL SUCCINATE 12.5 MG: 25 TABLET, EXTENDED RELEASE ORAL at 08:02

## 2025-02-01 RX ADMIN — BUSPIRONE HYDROCHLORIDE 5 MG: 5 TABLET ORAL at 08:02

## 2025-02-01 RX ADMIN — AMLODIPINE BESYLATE 2.5 MG: 2.5 TABLET ORAL at 08:02

## 2025-02-01 RX ADMIN — PREDNISONE 60 MG: 50 TABLET ORAL at 08:02

## 2025-02-01 RX ADMIN — LEVALBUTEROL 1.25 MG: 1.25 SOLUTION, CONCENTRATE RESPIRATORY (INHALATION) at 04:02

## 2025-02-01 NOTE — PLAN OF CARE
Plan of care discussed with patient. Patient is free of fall/trauma/injury. Denies CP, SOB, or pain/discomfort. All questions addressed. Will continue to monitor. Pt AAOx4 and VSS. Pt on 2.5LNC. Purewick in place, educated on importance of strict I&O's. Pure wick leaked x1 and saturated incontinence pad. Electrolytes monitored and replaced as needed. Call light in reach, bed locked in lowest position. Family at bedside.     Problem: Adult Inpatient Plan of Care  Goal: Plan of Care Review  Outcome: Progressing  Goal: Patient-Specific Goal (Individualized)  Outcome: Progressing  Goal: Absence of Hospital-Acquired Illness or Injury  Outcome: Progressing  Goal: Optimal Comfort and Wellbeing  Outcome: Progressing  Goal: Readiness for Transition of Care  Outcome: Progressing     Problem: Wound  Goal: Optimal Coping  Outcome: Progressing  Goal: Optimal Functional Ability  Outcome: Progressing  Goal: Absence of Infection Signs and Symptoms  Outcome: Progressing  Goal: Improved Oral Intake  Outcome: Progressing  Goal: Optimal Pain Control and Function  Outcome: Progressing  Goal: Skin Health and Integrity  Outcome: Progressing  Goal: Optimal Wound Healing  Outcome: Progressing     Problem: Skin Injury Risk Increased  Goal: Skin Health and Integrity  Outcome: Progressing     Problem: Heart Failure  Goal: Optimal Coping  Outcome: Progressing  Goal: Optimal Cardiac Output  Outcome: Progressing  Goal: Stable Heart Rate and Rhythm  Outcome: Progressing  Goal: Optimal Functional Ability  Outcome: Progressing  Goal: Fluid and Electrolyte Balance  Outcome: Progressing  Goal: Improved Oral Intake  Outcome: Progressing  Goal: Effective Oxygenation and Ventilation  Outcome: Progressing  Goal: Effective Breathing Pattern During Sleep  Outcome: Progressing     Problem: Hypertension Acute  Goal: Blood Pressure Within Desired Range  Outcome: Progressing

## 2025-02-01 NOTE — PROGRESS NOTES
[facility] - Cardiology [facility] Medicine  Progress Note    Patient Name: Misa Barajas  MRN: [id number]  Patient Class: IP- Inpatient   Admission Date: 1/29/2025  Length of Stay: 3 days  Attending Physician: Uche Antunez MD  Primary Care Provider: Isela Langston MD        Subjective     Principal Problem:Chronic respiratory failure with hypoxia        HPI:  78 yo female with COPD on 2L chronically, CHF, CKD, HTN, presenting for shortness of breath and wheezing for the past 2 days. She has been at home trying to fight it off she was able to turn up her oxygen to 5L at home and was taking duonebs at home. She called her pulmonologist who started her on doxy and prednisone. She had a prolonged stay last year for similar symptoms so she did not want to come to the hospital but eventually did come whenever she was not feeling any better. In the ER, she was placed on a non re-breather, which improved her symptoms.     In the ER, Bnp noted to be elevated to 2500, troponin elevated to 19, CXR with fluid. Lasix given. Medicine called for admission.     Overview/Hospital Course:  No notes on file    Interval History:  Patient down to 2.5 L nasal cannula.  No complaints at this time, close to baseline    Review of Systems   Unable to perform ROS: Other     Objective:     Vital Signs (Most Recent):  Temp: 97.5 °F (36.4 °C) (02/01/25 0819)  Pulse: 71 (02/01/25 1028)  Resp: 18 (02/01/25 0951)  BP: (!) 169/80 (02/01/25 0819)  SpO2: 96 % (02/01/25 0819) Vital Signs (24h Range):  Temp:  [97.5 °F (36.4 °C)-98.2 °F (36.8 °C)] 97.5 °F (36.4 °C)  Pulse:  [66-99] 71  Resp:  [18] 18  SpO2:  [93 %-99 %] 96 %  BP: (149-177)/(68-80) 169/80     Weight: 65.4 kg (144 lb 2.9 oz)  Body mass index is 25.54 kg/m².    Intake/Output Summary (Last 24 hours) at 2/1/2025 1053  Last data filed at 2/1/2025 0530  Gross per 24 hour   Intake 462 ml   Output 1275 ml   Net -813 ml           Physical Exam  Constitutional:       General: She is not  in acute distress.  HENT:      Head: Normocephalic.      Right Ear: External ear normal.      Left Ear: External ear normal.      Nose: Nose normal.      Comments: Nasal cannula  Cardiovascular:      Rate and Rhythm: Normal rate.   Pulmonary:      Breath sounds: Rales (few) present. No wheezing.   Abdominal:      Palpations: Abdomen is soft.      Tenderness: There is no abdominal tenderness.   Musculoskeletal:      Right lower leg: Edema present.      Left lower leg: Edema present.      Comments: Mild pedal edema   Neurological:      General: No focal deficit present.      Mental Status: She is alert and oriented to person, place, and time.            Significant Labs: All pertinent labs within the past 24 hours have been reviewed.      Assessment and Plan     * Chronic respiratory failure with hypoxia  Patient with Hypoxic Respiratory failure which is Acute on chronic.  she is on home oxygen at 2 LPM. Supplemental oxygen was provided and noted-  10L    .   Signs/symptoms of respiratory failure include- increased work of breathing. Contributing diagnoses includes - CHF and COPD Labs and images were reviewed. Patient Has not had a recent ABG. Will treat underlying causes and adjust management of respiratory failure as follows- started CHF pathway, will continue doxy and steroids    1/31  Acute on chronic respiratory failure.  No reports of heart failure on echocardiogram however elevated pulmonary pressures and CVP.  Patient reports that she has a pulmonologist as an outpatient, continue to follow up.  We will continue with diuresis Lasix 60 mg IV b.i.d. and treatment for COPD exacerbation.  Continue to wean oxygen   Accordingly  2/1  We will switch to oral diuretics, continue treatment for COPD exacerbation.  Aim for discharge tomorrow    COPD (chronic obstructive pulmonary disease)  Patient's COPD is with exacerbation noted by continued dyspnea and worsening of baseline hypoxia currently.  Patient is currently off  COPD Pathway. Continue scheduled inhalers Steroids, Antibiotics, and Supplemental oxygen and monitor respiratory status closely.     Hyperlipidemia  Chronic, controlled, continue to monitor    Acute on chronic diastolic heart failure  - CHF pathway started  - uncontrolled  - s/s corresponding to heart failure exacerbation  - last ECHO with Results for orders placed during the hospital encounter of 08/26/24    Echo    Interpretation Summary    Left Ventricle: The left ventricle is normal in size. Normal wall thickness. There is concentric remodeling. There is normal systolic function with a visually estimated ejection fraction of 60 - 65%. There is normal diastolic function.    Right Ventricle: Normal right ventricular cavity size. Wall thickness is normal. Systolic function is normal.    Aortic Valve: The aortic valve is a trileaflet valve. There is mild aortic valve sclerosis.    Mitral Valve: There is moderate mitral annular calcification present.    Tricuspid Valve: There is mild regurgitation.    Pulmonary Artery: The estimated pulmonary artery systolic pressure is 34 mmHg.    IVC/SVC: Normal venous pressure at 3 mmHg.    - BNP elevated to >2500, troponin 19  - will continue diuresis with lasix 40mg IV BID  - strict I/Os, fluid restricted diet  - monitor daily weights, telemetry  - replace K and Mg above 4 and 2 resp  - monitor oxygenation  - Continue Beta Blocker and Furosemide               HTN (hypertension)  Patient's blood pressure range in the last 24 hours was: BP  Min: 146/92  Max: 188/99.The patient's inpatient anti-hypertensive regimen is listed below:  Current Antihypertensives  metoprolol succinate (TOPROL-XL) 24 hr split tablet 12.5 mg, Daily, Oral  furosemide injection 40 mg, Every 12 hours, Intravenous    Plan  - BP is controlled, no changes needed to their regimen        VTE Risk Mitigation (From admission, onward)           Ordered     enoxaparin injection 40 mg  Daily         01/29/25 5152      IP VTE HIGH RISK PATIENT  Once         01/29/25 2312     Place sequential compression device  Until discontinued         01/29/25 2312                    Discharge Planning   NOHEMI: 2/2/2025     Code Status: Full Code   Medical Readiness for Discharge Date:                            Uche Antunez MD  Department of Hospital Medicine   [facility] - Cardiology Stepdown

## 2025-02-01 NOTE — SUBJECTIVE & OBJECTIVE
Interval History:  Patient down to 2.5 L nasal cannula.  No complaints at this time, close to baseline    Review of Systems   Unable to perform ROS: Other     Objective:     Vital Signs (Most Recent):  Temp: 97.5 °F (36.4 °C) (02/01/25 0819)  Pulse: 71 (02/01/25 1028)  Resp: 18 (02/01/25 0951)  BP: (!) 169/80 (02/01/25 0819)  SpO2: 96 % (02/01/25 0819) Vital Signs (24h Range):  Temp:  [97.5 °F (36.4 °C)-98.2 °F (36.8 °C)] 97.5 °F (36.4 °C)  Pulse:  [66-99] 71  Resp:  [18] 18  SpO2:  [93 %-99 %] 96 %  BP: (149-177)/(68-80) 169/80     Weight: 65.4 kg (144 lb 2.9 oz)  Body mass index is 25.54 kg/m².    Intake/Output Summary (Last 24 hours) at 2/1/2025 1053  Last data filed at 2/1/2025 0530  Gross per 24 hour   Intake 462 ml   Output 1275 ml   Net -813 ml           Physical Exam  Constitutional:       General: She is not in acute distress.  HENT:      Head: Normocephalic.      Right Ear: External ear normal.      Left Ear: External ear normal.      Nose: Nose normal.      Comments: Nasal cannula  Cardiovascular:      Rate and Rhythm: Normal rate.   Pulmonary:      Breath sounds: Rales (few) present. No wheezing.   Abdominal:      Palpations: Abdomen is soft.      Tenderness: There is no abdominal tenderness.   Musculoskeletal:      Right lower leg: Edema present.      Left lower leg: Edema present.      Comments: Mild pedal edema   Neurological:      General: No focal deficit present.      Mental Status: She is alert and oriented to person, place, and time.            Significant Labs: All pertinent labs within the past 24 hours have been reviewed.

## 2025-02-01 NOTE — ASSESSMENT & PLAN NOTE
Patient with Hypoxic Respiratory failure which is Acute on chronic.  she is on home oxygen at 2 LPM. Supplemental oxygen was provided and noted-  10L    .   Signs/symptoms of respiratory failure include- increased work of breathing. Contributing diagnoses includes - CHF and COPD Labs and images were reviewed. Patient Has not had a recent ABG. Will treat underlying causes and adjust management of respiratory failure as follows- started CHF pathway, will continue doxy and steroids    1/31  Acute on chronic respiratory failure.  No reports of heart failure on echocardiogram however elevated pulmonary pressures and CVP.  Patient reports that she has a pulmonologist as an outpatient, continue to follow up.  We will continue with diuresis Lasix 60 mg IV b.i.d. and treatment for COPD exacerbation.  Continue to wean oxygen   Accordingly  2/1  We will switch to oral diuretics, continue treatment for COPD exacerbation.  Aim for discharge tomorrow

## 2025-02-01 NOTE — PLAN OF CARE
AAOX4,VSS,O2 sats > 95% on 5L NC. Plan of care discussed with patient. Patient has no complaints of chest pain/SOB/palpitations. Pt ambulating  with assist x 2, fall precautions in place,no falls/injuries through the shift.Discussed medications and care.Patient has no questions at this time.Pt resting comfortably with no acute distress.Call light within reach,bed at lowest position.  Problem: Wound  Goal: Optimal Coping  Outcome: Progressing  Goal: Absence of Infection Signs and Symptoms  Outcome: Progressing     Problem: Skin Injury Risk Increased  Goal: Skin Health and Integrity  Outcome: Progressing     Problem: Heart Failure  Goal: Optimal Cardiac Output  Outcome: Progressing  Goal: Effective Oxygenation and Ventilation  Outcome: Progressing     Problem: Hypertension Acute  Goal: Blood Pressure Within Desired Range  Outcome: Progressing

## 2025-02-02 LAB
ANION GAP SERPL CALC-SCNC: 7 MMOL/L (ref 8–16)
BUN SERPL-MCNC: 44 MG/DL (ref 8–23)
CALCIUM SERPL-MCNC: 10 MG/DL (ref 8.7–10.5)
CHLORIDE SERPL-SCNC: 109 MMOL/L (ref 95–110)
CO2 SERPL-SCNC: 28 MMOL/L (ref 23–29)
CREAT SERPL-MCNC: 1.2 MG/DL (ref 0.5–1.4)
EST. GFR  (NO RACE VARIABLE): 45.5 ML/MIN/1.73 M^2
GLUCOSE SERPL-MCNC: 85 MG/DL (ref 70–110)
MAGNESIUM SERPL-MCNC: 2.1 MG/DL (ref 1.6–2.6)
POTASSIUM SERPL-SCNC: 3.6 MMOL/L (ref 3.5–5.1)
SODIUM SERPL-SCNC: 144 MMOL/L (ref 136–145)

## 2025-02-02 PROCEDURE — 80048 BASIC METABOLIC PNL TOTAL CA: CPT | Performed by: INTERNAL MEDICINE

## 2025-02-02 PROCEDURE — 99900035 HC TECH TIME PER 15 MIN (STAT)

## 2025-02-02 PROCEDURE — 63600175 PHARM REV CODE 636 W HCPCS: Performed by: INTERNAL MEDICINE

## 2025-02-02 PROCEDURE — 25000242 PHARM REV CODE 250 ALT 637 W/ HCPCS: Performed by: INTERNAL MEDICINE

## 2025-02-02 PROCEDURE — 25000003 PHARM REV CODE 250: Performed by: NURSE PRACTITIONER

## 2025-02-02 PROCEDURE — 27000221 HC OXYGEN, UP TO 24 HOURS

## 2025-02-02 PROCEDURE — 94761 N-INVAS EAR/PLS OXIMETRY MLT: CPT

## 2025-02-02 PROCEDURE — 25000003 PHARM REV CODE 250: Performed by: INTERNAL MEDICINE

## 2025-02-02 PROCEDURE — 20600001 HC STEP DOWN PRIVATE ROOM

## 2025-02-02 PROCEDURE — 83735 ASSAY OF MAGNESIUM: CPT | Performed by: INTERNAL MEDICINE

## 2025-02-02 PROCEDURE — 94640 AIRWAY INHALATION TREATMENT: CPT

## 2025-02-02 PROCEDURE — 36415 COLL VENOUS BLD VENIPUNCTURE: CPT | Performed by: INTERNAL MEDICINE

## 2025-02-02 RX ORDER — LEVALBUTEROL 1.25 MG/.5ML
1.25 SOLUTION, CONCENTRATE RESPIRATORY (INHALATION) EVERY 8 HOURS PRN
Status: DISCONTINUED | OUTPATIENT
Start: 2025-02-02 | End: 2025-02-03 | Stop reason: HOSPADM

## 2025-02-02 RX ORDER — AMOXICILLIN 250 MG
1 CAPSULE ORAL 2 TIMES DAILY
Status: DISCONTINUED | OUTPATIENT
Start: 2025-02-02 | End: 2025-02-03 | Stop reason: HOSPADM

## 2025-02-02 RX ADMIN — METOPROLOL SUCCINATE 12.5 MG: 25 TABLET, EXTENDED RELEASE ORAL at 08:02

## 2025-02-02 RX ADMIN — BUSPIRONE HYDROCHLORIDE 5 MG: 5 TABLET ORAL at 08:02

## 2025-02-02 RX ADMIN — LEVALBUTEROL 1.25 MG: 1.25 SOLUTION, CONCENTRATE RESPIRATORY (INHALATION) at 12:02

## 2025-02-02 RX ADMIN — FUROSEMIDE 40 MG: 40 TABLET ORAL at 08:02

## 2025-02-02 RX ADMIN — CLONIDINE HYDROCHLORIDE 0.1 MG: 0.1 TABLET ORAL at 09:02

## 2025-02-02 RX ADMIN — PREDNISONE 60 MG: 50 TABLET ORAL at 08:02

## 2025-02-02 RX ADMIN — POLYETHYLENE GLYCOL 3350 17 G: 17 POWDER, FOR SOLUTION ORAL at 09:02

## 2025-02-02 RX ADMIN — SENNOSIDES AND DOCUSATE SODIUM 1 TABLET: 50; 8.6 TABLET ORAL at 10:02

## 2025-02-02 RX ADMIN — AMLODIPINE BESYLATE 2.5 MG: 2.5 TABLET ORAL at 08:02

## 2025-02-02 RX ADMIN — ASPIRIN 81 MG: 81 TABLET, COATED ORAL at 08:02

## 2025-02-02 RX ADMIN — DOXYCYCLINE HYCLATE 100 MG: 100 TABLET, COATED ORAL at 08:02

## 2025-02-02 RX ADMIN — ENOXAPARIN SODIUM 40 MG: 40 INJECTION SUBCUTANEOUS at 04:02

## 2025-02-02 RX ADMIN — LEVALBUTEROL 1.25 MG: 1.25 SOLUTION, CONCENTRATE RESPIRATORY (INHALATION) at 08:02

## 2025-02-02 NOTE — SUBJECTIVE & OBJECTIVE
Interval History:  Elevated blood pressures noted, likely accentuated by steroids.  Stable on 2 L of oxygen.  Has not had a bowel movement in 5 days    Review of Systems   Unable to perform ROS: Other     Objective:     Vital Signs (Most Recent):  Temp: 97.9 °F (36.6 °C) (02/02/25 1131)  Pulse: 75 (02/02/25 1131)  Resp: 18 (02/02/25 0822)  BP: (!) 159/69 (02/02/25 1131)  SpO2: 99 % (02/02/25 1131) Vital Signs (24h Range):  Temp:  [97.5 °F (36.4 °C)-98.3 °F (36.8 °C)] 97.9 °F (36.6 °C)  Pulse:  [68-87] 75  Resp:  [18-20] 18  SpO2:  [93 %-99 %] 99 %  BP: (150-181)/(69-87) 159/69     Weight: 65.4 kg (144 lb 2.9 oz)  Body mass index is 25.54 kg/m².    Intake/Output Summary (Last 24 hours) at 2/2/2025 1241  Last data filed at 2/2/2025 1143  Gross per 24 hour   Intake 600 ml   Output 825 ml   Net -225 ml           Physical Exam  Constitutional:       General: She is not in acute distress.  HENT:      Head: Normocephalic.      Right Ear: External ear normal.      Left Ear: External ear normal.      Nose: Nose normal.      Comments: Nasal cannula  Cardiovascular:      Rate and Rhythm: Normal rate.   Pulmonary:      Improved.   Abdominal:      Palpations: Abdomen is soft.      Tenderness: There is no abdominal tenderness.   Musculoskeletal:       Comments: Mild pedal edema   Neurological:      General: No focal deficit present.      Mental Status: She is alert and oriented to person, place, and time.            Significant Labs: All pertinent labs within the past 24 hours have been reviewed.

## 2025-02-02 NOTE — PLAN OF CARE
Plan of care discussed with patient. Patient is free of fall/trauma/injury. Denies CP, SOB, or pain/discomfort. All questions addressed. Will continue to monitor. AAOx4 and VSS. Pt on purewick. Miralax given as Pt has not had BM since 1/28, no BM yet but patient reports passing flatus. Call light in reach, bed alarm set.    0Problem: Adult Inpatient Plan of Care  Goal: Plan of Care Review  Outcome: Progressing  Goal: Patient-Specific Goal (Individualized)  Outcome: Progressing  Goal: Absence of Hospital-Acquired Illness or Injury  Outcome: Progressing  Goal: Optimal Comfort and Wellbeing  Outcome: Progressing  Goal: Readiness for Transition of Care  Outcome: Progressing     Problem: Wound  Goal: Optimal Coping  Outcome: Progressing  Goal: Optimal Functional Ability  Outcome: Progressing  Goal: Absence of Infection Signs and Symptoms  Outcome: Progressing  Goal: Improved Oral Intake  Outcome: Progressing  Goal: Optimal Pain Control and Function  Outcome: Progressing  Goal: Skin Health and Integrity  Outcome: Progressing  Goal: Optimal Wound Healing  Outcome: Progressing     Problem: Skin Injury Risk Increased  Goal: Skin Health and Integrity  Outcome: Progressing     Problem: Heart Failure  Goal: Optimal Coping  Outcome: Progressing  Goal: Optimal Cardiac Output  Outcome: Progressing  Goal: Stable Heart Rate and Rhythm  Outcome: Progressing  Goal: Optimal Functional Ability  Outcome: Progressing  Goal: Fluid and Electrolyte Balance  Outcome: Progressing  Goal: Improved Oral Intake  Outcome: Progressing  Goal: Effective Oxygenation and Ventilation  Outcome: Progressing  Goal: Effective Breathing Pattern During Sleep  Outcome: Progressing     Problem: Hypertension Acute  Goal: Blood Pressure Within Desired Range  Outcome: Progressing

## 2025-02-02 NOTE — PROGRESS NOTES
[facility] - Cardiology [facility] Medicine  Progress Note    Patient Name: Misa Barajas  MRN: [id number]  Patient Class: IP- Inpatient   Admission Date: 1/29/2025  Length of Stay: 4 days  Attending Physician: Uche Antunez MD  Primary Care Provider: Isela Langston MD        Subjective     Principal Problem:Chronic respiratory failure with hypoxia        HPI:  78 yo female with COPD on 2L chronically, CHF, CKD, HTN, presenting for shortness of breath and wheezing for the past 2 days. She has been at home trying to fight it off she was able to turn up her oxygen to 5L at home and was taking duonebs at home. She called her pulmonologist who started her on doxy and prednisone. She had a prolonged stay last year for similar symptoms so she did not want to come to the hospital but eventually did come whenever she was not feeling any better. In the ER, she was placed on a non re-breather, which improved her symptoms.     In the ER, Bnp noted to be elevated to 2500, troponin elevated to 19, CXR with fluid. Lasix given. Medicine called for admission.     Overview/Hospital Course:  No notes on file    Interval History:  Elevated blood pressures noted, likely accentuated by steroids.  Stable on 2 L of oxygen.  Has not had a bowel movement in 5 days    Review of Systems   Unable to perform ROS: Other     Objective:     Vital Signs (Most Recent):  Temp: 97.9 °F (36.6 °C) (02/02/25 1131)  Pulse: 75 (02/02/25 1131)  Resp: 18 (02/02/25 0822)  BP: (!) 159/69 (02/02/25 1131)  SpO2: 99 % (02/02/25 1131) Vital Signs (24h Range):  Temp:  [97.5 °F (36.4 °C)-98.3 °F (36.8 °C)] 97.9 °F (36.6 °C)  Pulse:  [68-87] 75  Resp:  [18-20] 18  SpO2:  [93 %-99 %] 99 %  BP: (150-181)/(69-87) 159/69     Weight: 65.4 kg (144 lb 2.9 oz)  Body mass index is 25.54 kg/m².    Intake/Output Summary (Last 24 hours) at 2/2/2025 1241  Last data filed at 2/2/2025 1143  Gross per 24 hour   Intake 600 ml   Output 825 ml   Net -225 ml           Physical  Exam  Constitutional:       General: She is not in acute distress.  HENT:      Head: Normocephalic.      Right Ear: External ear normal.      Left Ear: External ear normal.      Nose: Nose normal.      Comments: Nasal cannula  Cardiovascular:      Rate and Rhythm: Normal rate.   Pulmonary:      Improved.   Abdominal:      Palpations: Abdomen is soft.      Tenderness: There is no abdominal tenderness.   Musculoskeletal:       Comments: Mild pedal edema   Neurological:      General: No focal deficit present.      Mental Status: She is alert and oriented to person, place, and time.            Significant Labs: All pertinent labs within the past 24 hours have been reviewed.    Assessment and Plan     * Chronic respiratory failure with hypoxia  Patient with Hypoxic Respiratory failure which is Acute on chronic.  she is on home oxygen at 2 LPM. Supplemental oxygen was provided and noted-  10L    .   Signs/symptoms of respiratory failure include- increased work of breathing. Contributing diagnoses includes - CHF and COPD Labs and images were reviewed. Patient Has not had a recent ABG. Will treat underlying causes and adjust management of respiratory failure as follows- started CHF pathway, will continue doxy and steroids    1/31  Acute on chronic respiratory failure.  No reports of heart failure on echocardiogram however elevated pulmonary pressures and CVP.  Patient reports that she has a pulmonologist as an outpatient, continue to follow up.  We will continue with diuresis Lasix 60 mg IV b.i.d. and treatment for COPD exacerbation.  Continue to wean oxygen   Accordingly  2/1  We will switch to oral diuretics, continue treatment for COPD exacerbation.  Aim for discharge tomorrow    2/2  Back to baseline and stable,  completed treatment for COPD exacerbation.  We will discontinue steroids at this time as it worsens his elevated blood pressures.   We will aim for a bowel movement today and hope for discharge  tomorrow.    COPD (chronic obstructive pulmonary disease)  Patient's COPD is with exacerbation noted by continued dyspnea and worsening of baseline hypoxia currently.  Patient is currently off COPD Pathway. Continue scheduled inhalers Steroids, Antibiotics, and Supplemental oxygen and monitor respiratory status closely.     Hyperlipidemia  Chronic, controlled, continue to monitor    Acute on chronic diastolic heart failure  - CHF pathway started  - uncontrolled  - s/s corresponding to heart failure exacerbation  - last ECHO with Results for orders placed during the hospital encounter of 08/26/24    Echo    Interpretation Summary    Left Ventricle: The left ventricle is normal in size. Normal wall thickness. There is concentric remodeling. There is normal systolic function with a visually estimated ejection fraction of 60 - 65%. There is normal diastolic function.    Right Ventricle: Normal right ventricular cavity size. Wall thickness is normal. Systolic function is normal.    Aortic Valve: The aortic valve is a trileaflet valve. There is mild aortic valve sclerosis.    Mitral Valve: There is moderate mitral annular calcification present.    Tricuspid Valve: There is mild regurgitation.    Pulmonary Artery: The estimated pulmonary artery systolic pressure is 34 mmHg.    IVC/SVC: Normal venous pressure at 3 mmHg.    - BNP elevated to >2500, troponin 19  - will continue diuresis with lasix 40mg IV BID  - strict I/Os, fluid restricted diet  - monitor daily weights, telemetry  - replace K and Mg above 4 and 2 resp  - monitor oxygenation  - Continue Beta Blocker and Furosemide               HTN (hypertension)  Patient's blood pressure range in the last 24 hours was: BP  Min: 146/92  Max: 188/99.The patient's inpatient anti-hypertensive regimen is listed below:  Current Antihypertensives  metoprolol succinate (TOPROL-XL) 24 hr split tablet 12.5 mg, Daily, Oral  furosemide injection 40 mg, Every 12 hours,  Intravenous    Plan  - BP is controlled, no changes needed to their regimen        VTE Risk Mitigation (From admission, onward)           Ordered     enoxaparin injection 40 mg  Daily         01/29/25 2312     IP VTE HIGH RISK PATIENT  Once         01/29/25 2312     Place sequential compression device  Until discontinued         01/29/25 2312                    Discharge Planning   NOHEMI: 2/2/2025     Code Status: Full Code   Medical Readiness for Discharge Date:                            Uche Antunez MD  Department of Hospital Medicine   [facility] - Cardiology Stepdown

## 2025-02-02 NOTE — ASSESSMENT & PLAN NOTE
Patient with Hypoxic Respiratory failure which is Acute on chronic.  she is on home oxygen at 2 LPM. Supplemental oxygen was provided and noted-  10L    .   Signs/symptoms of respiratory failure include- increased work of breathing. Contributing diagnoses includes - CHF and COPD Labs and images were reviewed. Patient Has not had a recent ABG. Will treat underlying causes and adjust management of respiratory failure as follows- started CHF pathway, will continue doxy and steroids    1/31  Acute on chronic respiratory failure.  No reports of heart failure on echocardiogram however elevated pulmonary pressures and CVP.  Patient reports that she has a pulmonologist as an outpatient, continue to follow up.  We will continue with diuresis Lasix 60 mg IV b.i.d. and treatment for COPD exacerbation.  Continue to wean oxygen   Accordingly  2/1  We will switch to oral diuretics, continue treatment for COPD exacerbation.  Aim for discharge tomorrow    2/2  Back to baseline and stable,  completed treatment for COPD exacerbation.  We will discontinue steroids at this time as it worsens his elevated blood pressures.   We will aim for a bowel movement today and hope for discharge tomorrow.

## 2025-02-02 NOTE — PLAN OF CARE
AAOX4,VSS,O2 sats > 95% on 2.5 L NC. Plan of care discussed with patient. Patient has no complaints of chest pain/SOB/palpitations. Pt ambulating  with assist x 1 fall precautions in place,no falls/injuries through the shift.Discussed medications and care.Patient has no questions at this time.Pt resting comfortably with no acute distress.Call light within reach,bed at lowest position.        Problem: Wound  Goal: Optimal Functional Ability  Outcome: Progressing     Problem: Skin Injury Risk Increased  Goal: Skin Health and Integrity  Outcome: Progressing     Problem: Heart Failure  Goal: Stable Heart Rate and Rhythm  Outcome: Progressing  Goal: Fluid and Electrolyte Balance  Outcome: Progressing     Problem: Hypertension Acute  Goal: Blood Pressure Within Desired Range  Outcome: Progressing

## 2025-02-03 ENCOUNTER — TELEPHONE (OUTPATIENT)
Dept: PRIMARY CARE CLINIC | Facility: CLINIC | Age: 80
End: 2025-02-03
Payer: MEDICARE

## 2025-02-03 VITALS
OXYGEN SATURATION: 94 % | BODY MASS INDEX: 25.55 KG/M2 | RESPIRATION RATE: 18 BRPM | SYSTOLIC BLOOD PRESSURE: 145 MMHG | HEART RATE: 65 BPM | WEIGHT: 144.19 LBS | DIASTOLIC BLOOD PRESSURE: 85 MMHG | TEMPERATURE: 98 F | HEIGHT: 63 IN

## 2025-02-03 PROCEDURE — 25000003 PHARM REV CODE 250: Performed by: NURSE PRACTITIONER

## 2025-02-03 PROCEDURE — 27000221 HC OXYGEN, UP TO 24 HOURS

## 2025-02-03 PROCEDURE — 25000003 PHARM REV CODE 250: Performed by: INTERNAL MEDICINE

## 2025-02-03 PROCEDURE — 94761 N-INVAS EAR/PLS OXIMETRY MLT: CPT

## 2025-02-03 RX ORDER — BENZONATATE 100 MG/1
100 CAPSULE ORAL 3 TIMES DAILY PRN
Qty: 30 CAPSULE | Refills: 0 | Status: SHIPPED | OUTPATIENT
Start: 2025-02-03 | End: 2025-02-13

## 2025-02-03 RX ORDER — LOSARTAN POTASSIUM 50 MG/1
50 TABLET ORAL DAILY
Status: DISCONTINUED | OUTPATIENT
Start: 2025-02-03 | End: 2025-02-03 | Stop reason: HOSPADM

## 2025-02-03 RX ORDER — LOSARTAN POTASSIUM 50 MG/1
50 TABLET ORAL DAILY
Qty: 90 TABLET | Refills: 3 | Status: SHIPPED | OUTPATIENT
Start: 2025-02-03 | End: 2025-02-13

## 2025-02-03 RX ADMIN — FUROSEMIDE 40 MG: 40 TABLET ORAL at 08:02

## 2025-02-03 RX ADMIN — ASPIRIN 81 MG: 81 TABLET, COATED ORAL at 08:02

## 2025-02-03 RX ADMIN — LOSARTAN POTASSIUM 50 MG: 50 TABLET, FILM COATED ORAL at 05:02

## 2025-02-03 RX ADMIN — BUSPIRONE HYDROCHLORIDE 5 MG: 5 TABLET ORAL at 08:02

## 2025-02-03 RX ADMIN — SENNOSIDES AND DOCUSATE SODIUM 1 TABLET: 50; 8.6 TABLET ORAL at 08:02

## 2025-02-03 RX ADMIN — METOPROLOL SUCCINATE 12.5 MG: 25 TABLET, EXTENDED RELEASE ORAL at 08:02

## 2025-02-03 NOTE — NURSING
AAOX4,VSS,O2 sats > 95% on 2.5 L NC. Plan of care discussed with patient. Patient has no complaints of chest pain/SOB/palpitations. Pt ambulating  with assist x 1 fall precautions in place,no falls/injuries through the shift.Discussed medications and care.PRN stool softener and Clonidine given per order. Patient has no questions at this time.Pt resting comfortably with no acute distress.Call light within reach,bed at lowest position.

## 2025-02-03 NOTE — TELEPHONE ENCOUNTER
Patient scheduled for appt with provider 02/10/2025. Reported to Maki Acevedo, SAMY, RSW, BSW  Case Management  [phone].

## 2025-02-03 NOTE — PLAN OF CARE
10:20 AM- CHW met patient at bedside to complete IMM, patient asleep. Will try again later.    11:45 AM CHW met with patient/family at bedside. Patient experience rounding completed and reviewed the following.     Do you know your discharge plan? Yes or No,      If yes, what is the plan? (Home, Home Health, Rehab, SNF, LTAC, or Other)  Yes Home    Have you discussed your needs and preferences with your SW/CM? Yes or No  Yes Home    If you are discharging home, do you have help at home? Yes or No Yes (family)    Do you think you will need help additional at home at discharge? Yes or No   No    Do you currently have difficulty keeping up with bills, affording medicine or buying food? Yes or No No    Assigned SW/CM notified of any patient/family needs or concerns. Appropriate resources provided to address patient's needs.  Vianey Acevedo, CHW, RSW, BSW  Case Management  [phone]

## 2025-02-03 NOTE — PLAN OF CARE
[facility] - Cardiology Stepdown      HOME HEALTH ORDERS  FACE TO FACE ENCOUNTER    Patient Name: Misa Barajas  YOB: 1945    PCP: Isela Langston MD   PCP Address: [address] [place] [facility]  PCP Phone Number: [phone]  PCP Fax: [phone]    Encounter Date: 1/29/25    Admit to Home Health    Diagnoses:  Active Hospital Problems    Diagnosis  POA    *Chronic respiratory failure with hypoxia [J96.11]  Yes     Chronic     POA  Home O2- 2L      COPD (chronic obstructive pulmonary disease) [J44.9]  Yes    Atelectasis [J98.11]  Yes     CXR on admit with atelectasis and pleural effusions, continue to diurese and monitor vitals       Pleural effusion [J90]  Yes     CXR with bilateral pleural effusions  Supplemental O2 at 5L  Continue to diurese       Acute on chronic diastolic heart failure [I50.33]  Yes    Hyperlipidemia [E78.5]  Yes    HTN (hypertension) [I10]  Yes      Resolved Hospital Problems   No resolved problems to display.       Follow Up Appointments:  Future Appointments   Date Time Provider Department Center   2/10/2025  8:40 AM Irma Brasher MD [facility] 65PLUS 65+ [place]   2/11/2025  9:30 AM LAB, APPOINTMENT [facility] LAB [facility]   2/11/2025 10:00 AM Arleth Bell PA-C [facility]   2/11/2025 10:00 AM [facility] HEART FAILURE NURSE [facility]   3/18/2025 11:20 AM Isela Langston MD OL 65PLUS 65+ [place]   4/3/2025  1:00 PM Ibeth Christy MD [facility] NEPHRO [facility]       Allergies:  Review of patient's allergies indicates:   Allergen Reactions    Adhesive tape-silicones     Adhesive Rash     Pt states she removed a LATEX bandaid and the skin beneath was swollen and red. No other latex causes a reaction.       Medications: Review discharge medications with patient and family and provide education.    Current Facility-Administered Medications   Medication Dose Route Frequency Provider Last Rate Last Admin    acetaminophen tablet  650 mg  650 mg Oral Q8H PRN David Rudolph MD   650 mg at 01/30/25 1715    aspirin EC tablet 81 mg  81 mg Oral Daily David Rudolph MD   81 mg at 02/03/25 0859    benzonatate capsule 100 mg  100 mg Oral TID PRN David Rudolph MD        busPIRone tablet 5 mg  5 mg Oral Daily David Rudolph MD   5 mg at 02/03/25 0859    calcium carbonate 200 mg calcium (500 mg) chewable tablet 1,000 mg  1,000 mg Oral TID PRN David Rudolph MD        cloNIDine tablet 0.1 mg  0.1 mg Oral Q8H PRN Uche Antunez MD   0.1 mg at 02/02/25 2147    enoxaparin injection 40 mg  40 mg Subcutaneous Daily David Rudolph MD   40 mg at 02/02/25 1649    furosemide tablet 40 mg  40 mg Oral Daily Uche Antunez MD   40 mg at 02/03/25 0859    levalbuterol nebulizer solution 1.25 mg  1.25 mg Nebulization Q8H PRN Uche Antunez MD        metoprolol succinate (TOPROL-XL) 24 hr split tablet 12.5 mg  12.5 mg Oral Daily David Rudolph MD   12.5 mg at 02/03/25 0859    polyethylene glycol packet 17 g  17 g Oral TID PRN David Rudolph MD   17 g at 02/02/25 0937    senna-docusate 8.6-50 mg per tablet 1 tablet  1 tablet Oral BID Claribel Ley, NP   1 tablet at 02/03/25 0859    sodium chloride 0.9% flush 10 mL  10 mL Intravenous PRN David Rudolph MD        trazodone split tablet 25 mg  25 mg Oral Nightly PRN David Rudolph MD            Medication List        START taking these medications      benzonatate 100 MG capsule  Commonly known as: TESSALON  Take 1 capsule (100 mg total) by mouth 3 (three) times daily as needed for Cough.            CONTINUE taking these medications      acetaminophen 500 MG tablet  Commonly known as: TYLENOL     aspirin 81 MG EC tablet  Commonly known as: ECOTRIN  1 tablet Orally Once a day 30 days     BD POSIFLUSH NORMAL SALINE 0.9 injection  Generic drug: sodium chloride 0.9%     biotin 1 mg Cap  1 capsule Orally Once a day 30 days     PRATIMA AEROSPHERE 160-9-4.8 mcg/actuation Hfaa  Generic drug: budesonide-glycopyr-formoterol  Inhale 2 puffs into  the lungs 2 (two) times daily.     busPIRone 5 MG Tab  Commonly known as: BUSPAR  1 tablet Orally once daily as needed 30 days     FeroSuL 325 mg (65 mg iron) Tab tablet  Generic drug: ferrous sulfate  Take 1 tablet (325 mg total) by mouth every other day.     fludrocortisone 0.1 mg Tab  Commonly known as: FLORINEF  Take 2 tablets (0.2 mg total) by mouth once daily.     fluticasone propionate 50 mcg/actuation nasal spray  Commonly known as: FLONASE  1 spray by Each Nostril route daily as needed for Rhinitis or Allergies.     furosemide 40 MG tablet  Commonly known as: LASIX  Take 1 tablet (40 mg total) by mouth once daily.     levalbuterol 1.25 mg/3 mL nebulizer solution  Commonly known as: XOPENEX  Take 1 ampule by nebulization every 8 (eight) hours as needed for Wheezing or Shortness of Breath.     magnesium hydroxide 400 mg/5 ml 400 mg/5 mL Susp  Commonly known as: MILK OF MAGNESIA  30 ml as neded Orally once daily     meclizine 12.5 mg tablet  Commonly known as: ANTIVERT  Take 1 tablet (12.5 mg total) by mouth Daily.     metoprolol succinate 25 MG 24 hr tablet  Commonly known as: TOPROL-XL  Take ½ tablet (12.5 mg total) by mouth once daily.     MIRALAX 17 gram/dose powder  Generic drug: polyethylene glycol     ondansetron 4 MG tablet  Commonly known as: ZOFRAN  1 tablet as needed for nausea Orally every 8 hours 7 days     [facility]  Generic drug: inhalation spacing device  Use as directed for inhalation.     potassium chloride 10 MEQ Cpsr  Commonly known as: MICRO-K  Take 2 capsules (20 mEq total) by mouth once daily.     PROAIR RESPICLICK 90 mcg/actuation inhaler  Generic drug: albuterol sulfate  2 puffs as needed Inhalation every 6 hrs 30 days     senna-docusate 8.6-50 mg 8.6-50 mg per tablet  Commonly known as: PERICOLACE  1 tablet Orally Twice a day     traZODone 50 MG tablet  Commonly known as: DESYREL  Take 0.5 tablets (25 mg total) by mouth nightly as needed for Insomnia (Anxiety or  insomnia).     triamcinolone acetonide 0.1% 0.1 % cream  Commonly known as: KENALOG  Apply externally to affected area twice a day until clear     vitamin D 1000 units Tab  Commonly known as: VITAMIN D3  Take 2 tablets (2,000 Units total) by mouth once daily.            STOP taking these medications      doxycycline 100 MG tablet  Commonly known as: VIBRA-TABS     predniSONE 20 MG tablet  Commonly known as: DELTASONE                I have seen and examined this patient within the last 30 days. My clinical findings that support the need for the home health skilled services and home bound status are the following:no   Weakness/numbness causing balance and gait disturbance due to debility making it taxing to leave home.     Diet:   renal diet    Labs:  routine    Referrals/ Consults  Physical Therapy to evaluate and treat. Evaluate for home safety and equipment needs; Establish/upgrade home exercise program. Perform / instruct on therapeutic exercises, gait training, transfer training, and Range of Motion.  Occupational Therapy to evaluate and treat. Evaluate home environment for safety and equipment needs. Perform/Instruct on transfers, ADL training, ROM, and therapeutic exercises.  Aide to provide assistance with personal care, ADLs, and vital signs.    Activities:   activity as tolerated    Nursing:   Agency to admit patient within 24 hours of hospital discharge unless specified on physician order or at patient request    SN to complete comprehensive assessment including routine vital signs. Instruct on disease process and s/s of complications to report to MD. Review/verify medication list sent home with the patient at time of discharge  and instruct patient/caregiver as needed. Frequency may be adjusted depending on start of care date.     Skilled nurse to perform up to 3 visits PRN for symptoms related to diagnosis    Notify MD if SBP > 160 or < 90; DBP > 90 or < 50; HR > 120 or < 50; Temp > 101; O2 < 88%; Other:        Ok to schedule additional visits based on staff availability and patient request on consecutive days within the home health episode.    When multiple disciplines ordered:    Start of Care occurs on Sunday - Wednesday schedule remaining discipline evaluations as ordered on separate consecutive days following the start of care.    Thursday SOC -schedule subsequent evaluations Friday and Monday the following week.     Friday - Saturday SOC - schedule subsequent discipline evaluations on consecutive days starting Monday of the following week.    For all post-discharge communication and subsequent orders please contact patient's primary care physician. If unable to reach primary care physician or do not receive response within 30 minutes, please contact  for clinical staff order clarification      Home Health Aide:  Nursing , Physical Therapy , and Occupational Therapy         I certify that this patient is confined to her home and needs intermittent skilled nursing care, physical therapy, and occupational therapy.

## 2025-02-03 NOTE — PLAN OF CARE
11:48 AM- In-basket message left for PCP staff regarding patient needing hosp f/u appointment.    12:48 PM - Call received from Clinic appointment scheduled 2/10 at 8:40 AM. Patient needs to call clinic if she needs transportation.      Future Appointments   Date Time Provider Department Center   2/10/2025  8:40 AM Irma Brasher MD [facility] 65PLUS 65+ [place]   2/11/2025  9:30 AM LAB, APPOINTMENT [facility] LAB V[facility]   2/11/2025 10:00 AM Arleth Bell PA-C [facility]   2/11/2025 10:00 AM [facility] HEART FAILURE NURSE [facility]   3/18/2025 11:20 AM Isela Langston MD [facility] 65PLUS 65+ [place]   4/3/2025  1:00 PM Ibeth Christy MD [facility]         Maki Acevedo, CHW, RSW, BSW  Case Management  [id number]

## 2025-02-03 NOTE — TELEPHONE ENCOUNTER
----- Message from Farshad Acevedo sent at 2/3/2025 11:41 AM CST -----  Regarding: Hosp f/u  Good Morning,   Patient will be discharging today and needs Hosp f/u within a week.        Maki Acevedo, CHW, RSW, BSW  Case Management  [id number]

## 2025-02-03 NOTE — PLAN OF CARE
[facility] Stepdown  Initial Discharge Assessment       Primary Care Provider: Isela Langston MD    Admission Diagnosis: Shortness of breath [R06.02]  Acute exacerbation of CHF (congestive heart failure) [I50.9]    Admission Date: 1/29/2025  Expected Discharge Date: 2/3/2025    Transition of Care Barriers: (P) None    Payor: [employer] MEDICARE ADVANTAGE / Plan: [email or web address] HMO MCARE ADV / Product Type: Medicare Advantage /     Extended Emergency Contact Information  Primary Emergency Contact: Marina Gruber  Address: [address] [place] [address]  Mobile Phone: [phone]  Relation: Daughter  Secondary Emergency Contact: Esther Barajas   [place]  Mobile Phone: [phone]  Relation: Daughter    Discharge Plan A: (P) Home Health  Discharge Plan B: (P) Home      [facility]  [address]  [place] [address]  Phone: [phone] Fax: [phone]    [facility]  [address]  [place] [address]  Phone: [phone] Fax: [phone]    [id number]  [place] - [facility]  AT [facility] [address]  [address] [place] [address]  Phone: [phone] Fax: [phone]      Initial Assessment (most recent)       Adult Discharge Assessment - 02/02/25 1900          Discharge Assessment    Assessment Type Discharge Planning Assessment (P)      Confirmed/corrected address, phone number and insurance Yes (P)      Confirmed Demographics Correct on Facesheet (P)      Source of Information patient (P)      Communicated NOHEMI with patient/caregiver Yes (P)      Reason For Admission Chronic Respiratory Failure (P)      People in Home alone (P)      Do you expect to return to your current living situation? Yes (P)      Do you have help at home or someone to help you manage your care at home? Yes (P)      Who are your caregiver(s) and their phone number(s)? Daughters (P)       Prior to hospitilization cognitive status: Alert/Oriented (P)      Current cognitive status: Alert/Oriented (P)      Walking or Climbing Stairs Difficulty yes (P)      Walking or Climbing Stairs ambulation difficulty, requires equipment (P)      Mobility Management Rollator (P)      Dressing/Bathing Difficulty no (P)      Do you have any problems with: Needs other help (P)      Specify other help Family (P)      Home Accessibility wheelchair accessible (P)      Equipment Currently Used at Home rollator;wheelchair;bedside commode;grab bar (P)      Readmission within 30 days? No (P)      Patient currently being followed by outpatient case management? No (P)      Do you currently have service(s) that help you manage your care at home? No (P)      Do you take prescription medications? Yes (P)      Do you have prescription coverage? Yes (P)      Coverage [employer] Medicare (P)      Do you have any problems affording any of your prescribed medications? No (P)      Is the patient taking medications as prescribed? yes (P)      Who is going to help you get home at discharge? Daughters (P)      How do you get to doctors appointments? family or friend will provide (P)      Are you on dialysis? No (P)      Do you take coumadin? No (P)      Discharge Plan A Home Health (P)      Discharge Plan B Home (P)      DME Needed Upon Discharge  none (P)      Discharge Plan discussed with: Patient (P)      Transition of Care Barriers None (P)         Physical Activity    On average, how many days per week do you engage in moderate to strenuous exercise (like a brisk walk)? 0 days (P)      On average, how many minutes do you engage in exercise at this level? 0 min (P)         Financial Resource Strain    How hard is it for you to pay for the very basics like food, housing, medical care, and heating? Not hard at all (P)         Housing Stability    In the last 12 months, was there a time when you were not able to pay the mortgage or rent on  time? No (P)      At any time in the past 12 months, were you homeless or living in a shelter (including now)? No (P)         Transportation Needs    Has the lack of transportation kept you from medical appointments, meetings, work or from getting things needed for daily living? No (P)         Food Insecurity    Within the past 12 months, you worried that your food would run out before you got the money to buy more. Never true (P)      Within the past 12 months, the food you bought just didn't last and you didn't have money to get more. Never true (P)         Stress    Do you feel stress - tense, restless, nervous, or anxious, or unable to sleep at night because your mind is troubled all the time - these days? Not at all (P)         Social Isolation    How often do you feel lonely or isolated from those around you?  Never (P)         Alcohol Use    Q1: How often do you have a drink containing alcohol? Never (P)      Q2: How many drinks containing alcohol do you have on a typical day when you are drinking? Patient does not drink (P)      Q3: How often do you have six or more drinks on one occasion? Never (P)         Utilities    In the past 12 months has the electric, gas, oil, or water company threatened to shut off services in your home? No (P)         Health Literacy    How often do you need to have someone help you when you read instructions, pamphlets, or other written material from your doctor or pharmacy? Rarely (P)                         SW met with pt at bedside to complete Initial Discharge Planning Assessment. Pt. Lives alone in her home in [place], [place]. The home is single story with 1 step to enter. DME: rollator, wheelchair, home oxygen, bedside commode, shower bars. SW providing education to patient about keeping her oxygen tanks filled and home in case of emergencies. [facility] for PT/OT. Daughter provide transportation and will transport pt.home upon discharge.     Discharge Plan A and Plan  B have been determined by review of patient's clinical status, future medical and therapeutic needs, and coverage/benefits for post-acute care in coordination with multidisciplinary team members.     Shemar Han LMSW

## 2025-02-03 NOTE — PLAN OF CARE
[facility]  Discharge Final Note    Primary Care Provider: Isela Langston MD    Expected Discharge Date: 2/3/2025    Final Discharge Note (most recent)       Final Note - 02/03/25 1625          Final Note    Assessment Type Final Discharge Note     Anticipated Discharge Disposition Home or Self Care                     Important Message from Medicare  Important Message from Medicare regarding Discharge Appeal Rights: Explained to patient/caregiver, Other (comments) (completed with daughter via phone)     Date IMM was signed: 02/03/25  Time IMM was signed: 1235    Contact Info       Irma Brasher MD   Specialty: Internal Medicine    [address] [facility] [address]   Phone: [phone]       Next Steps: Call    Instructions: Call Clinic if transportation is needed.        SW reviewed pt chart and discussed pt during a.m. care team huddle. Pt to discharge to home with family to transport and PCP follow-up appt.       Dispo: Home with family       SULEIMAN Shanks

## 2025-02-03 NOTE — DISCHARGE SUMMARY
[facility] Card[facility] Medicine  Discharge Summary      Patient Name: Misa Barajas  MRN: [id number]  Admission Date: 1/29/2025  Hospital Length of Stay: 5 days  Discharge Date and Time:  02/03/2025 9:09 AM  Attending Physician: Uche Antunez MD   Discharging Provider: Uche Antunez MD  Discharge Provider Team: [facility] MED [facility]  Primary Care Provider: Isela Langston MD        HPI: 80 yo female with COPD on 2L chronically, CHF, CKD, HTN, presenting for shortness of breath and wheezing for the past 2 days. She has been at home trying to fight it off she was able to turn up her oxygen to 5L at home and was taking duonebs at home. She called her pulmonologist who started her on doxy and prednisone. She had a prolonged stay last year for similar symptoms so she did not want to come to the hospital but eventually did come whenever she was not feeling any better. In the ER, she was placed on a non re-breather, which improved her symptoms.      In the ER, Bnp noted to be elevated to 2500, troponin elevated to 19, CXR with fluid. Lasix given. Medicine called for admission.     * No surgery found *      Hospital Course:  Patient was admitted for acute on chronic hypoxic respiratory failure, was treated for presumed heart failure and COPD exacerbation.  Started on steroids, nebs and antibiotics in addition to this IV diuresis.  Echocardiogram was done with no evidence of heart failure however elevated pulmonary pressures and CVP, she was continued on diuresis and oxygen was weaned down to baseline, symptoms improved.  She had a bowel movement during hospital stay.  Patient was back to baseline she has a pulmonologist as an outpatient.  She is stable for discharge and is to follow up with her primary care within a week. Losartan added to her blood pressure regime      Physical Exam  Constitutional:       General: She is not in acute distress.  HENT:      Head: Normocephalic.      Right Ear: External ear normal.       Left Ear: External ear normal.      Nose: Nose normal.      Comments: Nasal cannula  Cardiovascular:      Rate and Rhythm: Normal rate.   Pulmonary:      Improved.   Abdominal:      Palpations: Abdomen is soft.      Tenderness: There is no abdominal tenderness.   Neurological:      General: No focal deficit present.      Mental Status: She is alert and oriented to person, place, and time.        Consults:   Consults (From admission, onward)          Status Ordering Provider     Inpatient consult to Social Work/Case Management  Once        Provider:  (Not yet assigned)    Acknowledged PETER LONGORIA     Inpatient consult to Registered Dietitian/Nutritionist  Once        Provider:  (Not yet assigned)    Completed PETER LONGORIA            Final Active Diagnoses:    Diagnosis Date Noted POA    PRINCIPAL PROBLEM:  Chronic respiratory failure with hypoxia [J96.11] 06/26/2021 Yes     Chronic    COPD (chronic obstructive pulmonary disease) [J44.9] 08/22/2022 Yes    Atelectasis [J98.11] 06/28/2021 Yes    Pleural effusion [J90] 06/25/2021 Yes    Acute on chronic diastolic heart failure [I50.33] 12/24/2019 Yes    Hyperlipidemia [E78.5] 11/20/2016 Yes    HTN (hypertension) [I10] 05/28/2015 Yes      Problems Resolved During this Admission:      Discharged Condition: stable    Disposition: Home or Self Care    Follow Up:    Patient Instructions:   No discharge procedures on file.  Medications:  Reconciled Home Medications:      Medication List        START taking these medications      benzonatate 100 MG capsule  Commonly known as: TESSALON  Take 1 capsule (100 mg total) by mouth 3 (three) times daily as needed for Cough.            CONTINUE taking these medications      acetaminophen 500 MG tablet  Commonly known as: TYLENOL     aspirin 81 MG EC tablet  Commonly known as: ECOTRIN  1 tablet Orally Once a day 30 days     BD POSIFLUSH NORMAL SALINE 0.9 injection  Generic drug: sodium chloride 0.9%     biotin 1 mg Cap  1 capsule  Orally Once a day 30 days     PRATIMA AEROSPHERE 160-9-4.8 mcg/actuation Hfaa  Generic drug: budesonide-glycopyr-formoterol  Inhale 2 puffs into the lungs 2 (two) times daily.     busPIRone 5 MG Tab  Commonly known as: BUSPAR  1 tablet Orally once daily as needed 30 days     FeroSuL 325 mg (65 mg iron) Tab tablet  Generic drug: ferrous sulfate  Take 1 tablet (325 mg total) by mouth every other day.     fludrocortisone 0.1 mg Tab  Commonly known as: FLORINEF  Take 2 tablets (0.2 mg total) by mouth once daily.     fluticasone propionate 50 mcg/actuation nasal spray  Commonly known as: FLONASE  1 spray by Each Nostril route daily as needed for Rhinitis or Allergies.     furosemide 40 MG tablet  Commonly known as: LASIX  Take 1 tablet (40 mg total) by mouth once daily.     levalbuterol 1.25 mg/3 mL nebulizer solution  Commonly known as: XOPENEX  Take 1 ampule by nebulization every 8 (eight) hours as needed for Wheezing or Shortness of Breath.     magnesium hydroxide 400 mg/5 ml 400 mg/5 mL Susp  Commonly known as: MILK OF MAGNESIA  30 ml as neded Orally once daily     meclizine 12.5 mg tablet  Commonly known as: ANTIVERT  Take 1 tablet (12.5 mg total) by mouth Daily.     metoprolol succinate 25 MG 24 hr tablet  Commonly known as: TOPROL-XL  Take ½ tablet (12.5 mg total) by mouth once daily.     MIRALAX 17 gram/dose powder  Generic drug: polyethylene glycol     ondansetron 4 MG tablet  Commonly known as: ZOFRAN  1 tablet as needed for nausea Orally every 8 hours 7 days     [facility]  Generic drug: inhalation spacing device  Use as directed for inhalation.     potassium chloride 10 MEQ Cpsr  Commonly known as: MICRO-K  Take 2 capsules (20 mEq total) by mouth once daily.     PROAIR RESPICLICK 90 mcg/actuation inhaler  Generic drug: albuterol sulfate  2 puffs as needed Inhalation every 6 hrs 30 days     senna-docusate 8.6-50 mg 8.6-50 mg per tablet  Commonly known as: PERICOLACE  1 tablet Orally Twice a day      traZODone 50 MG tablet  Commonly known as: DESYREL  Take 0.5 tablets (25 mg total) by mouth nightly as needed for Insomnia (Anxiety or insomnia).     triamcinolone acetonide 0.1% 0.1 % cream  Commonly known as: KENALOG  Apply externally to affected area twice a day until clear     vitamin D 1000 units Tab  Commonly known as: VITAMIN D3  Take 2 tablets (2,000 Units total) by mouth once daily.            STOP taking these medications      doxycycline 100 MG tablet  Commonly known as: VIBRA-TABS     predniSONE 20 MG tablet  Commonly known as: DELTASONE              Pending Diagnostic Studies:       None          Indwelling Lines/Drains at time of discharge:   Lines/Drains/Airways       None                   Time spent on the discharge of patient: 40 minutes         Uche Antunez MD  Department of Hospital Medicine  [facility] - Cardiology Stepdown

## 2025-02-03 NOTE — PLAN OF CARE
AAOX4,VSS,O2 sats>92% on 2L NC. Plan of care discussed with patient. Patient has no complaints of pain/SOB. Discussed medications and care. Patient has no questions at this time. Pt visualised and stable.  Call light within reach. Pt resting, bed at lowest position. Will continue to monitor through the shift.

## 2025-02-04 ENCOUNTER — PATIENT OUTREACH (OUTPATIENT)
Dept: ADMINISTRATIVE | Facility: CLINIC | Age: 80
End: 2025-02-04
Payer: MEDICARE

## 2025-02-04 NOTE — PROGRESS NOTES
C3 nurse spoke with Misa Barajas & daughter for a TCC post hospital discharge follow up call. The patient has a scheduled [facility] appointment with Irma Brasher on 02/10/2025 @ 2019.

## 2025-02-05 ENCOUNTER — PATIENT MESSAGE (OUTPATIENT)
Dept: PRIMARY CARE CLINIC | Facility: CLINIC | Age: 80
End: 2025-02-05
Payer: MEDICARE

## 2025-02-05 NOTE — TELEPHONE ENCOUNTER
Call to Maki Acevedo CHW  to ascertain HH name and contact referral/orders sent to. Maki transferred this CM to Albertina Nur who reported  she would call daughter and follow-up with discharge services.     Polatist message sent to patient.

## 2025-02-05 NOTE — PLAN OF CARE
[facility] Step[facility]  Discharge Final Note    Primary Care Provider: Isela Langston MD    Expected Discharge Date: 2/3/2025    Final Discharge Note (most recent)       Final Note - 02/03/25 1625          Final Note    Assessment Type Final Discharge Note     Anticipated Discharge Disposition Home or Self Care                     Important Message from Medicare  Important Message from Medicare regarding Discharge Appeal Rights: Explained to patient/caregiver, Other (comments) (completed with daughter via phone)     Date IMM was signed: 02/03/25  Time IMM was signed: 1235    Contact Info       Irma Brasher MD   Specialty: Internal Medicine    [address] [facility] [address]   Phone: [phone]       Next Steps: Call    Instructions: Call Clinic if transportation is needed.        SW received a call to contact pt Yasmani serrano Traci [phone].   Pt discharged on 2/3 and HH orders placed after hours. [facility] and referral was sent. Pt has no other SW/CM needs.      Vianey Gant, MSW  [id number]

## 2025-02-07 ENCOUNTER — TELEPHONE (OUTPATIENT)
Dept: PHARMACY | Facility: CLINIC | Age: 80
End: 2025-02-07
Payer: MEDICARE

## 2025-02-07 NOTE — TELEPHONE ENCOUNTER
[facility] Chart Review & Patient Outreach Details For Medication Adherence Project    Reason for Outreach Encounter: 3rd Party payor non-compliance report ([employer], [employer], [employer], etc)  2.  Patient Outreach Method: Reviewed patient chart   3.   Medication in question:    Hypertension Medications               furosemide (LASIX) 40 MG tablet Take 1 tablet (40 mg total) by mouth once daily.    losartan (COZAAR) 50 MG tablet Take 1 tablet (50 mg total) by mouth once daily.    metoprolol succinate (TOPROL-XL) 25 MG 24 hr tablet Take ½ tablet (12.5 mg total) by mouth once daily.                        Losartan LF 90 ds 2/3/25  Rosuvastatin dc'ed    4.  Reviewed and or Updates Made To: Patient Chart  5. Outreach Outcomes and/or actions taken: Patient filled medication and is on track to be adherent and Medication discontinued  Additional Notes:

## 2025-02-10 ENCOUNTER — OFFICE VISIT (OUTPATIENT)
Dept: PRIMARY CARE CLINIC | Facility: CLINIC | Age: 80
End: 2025-02-10
Payer: MEDICARE

## 2025-02-10 VITALS
OXYGEN SATURATION: 97 % | HEART RATE: 68 BPM | SYSTOLIC BLOOD PRESSURE: 130 MMHG | DIASTOLIC BLOOD PRESSURE: 66 MMHG | BODY MASS INDEX: 25.35 KG/M2 | WEIGHT: 143.06 LBS

## 2025-02-10 DIAGNOSIS — J43.2 CENTRILOBULAR EMPHYSEMA: Primary | ICD-10-CM

## 2025-02-10 DIAGNOSIS — I50.33 ACUTE ON CHRONIC DIASTOLIC HEART FAILURE: ICD-10-CM

## 2025-02-10 PROCEDURE — 3288F FALL RISK ASSESSMENT DOCD: CPT | Mod: CPTII,S$GLB,, | Performed by: INTERNAL MEDICINE

## 2025-02-10 PROCEDURE — 1159F MED LIST DOCD IN RCRD: CPT | Mod: CPTII,S$GLB,, | Performed by: INTERNAL MEDICINE

## 2025-02-10 PROCEDURE — 1126F AMNT PAIN NOTED NONE PRSNT: CPT | Mod: CPTII,S$GLB,, | Performed by: INTERNAL MEDICINE

## 2025-02-10 PROCEDURE — 99999 PR PBB SHADOW E&M-EST. PATIENT-LVL V: CPT | Mod: PBBFAC,,, | Performed by: INTERNAL MEDICINE

## 2025-02-10 PROCEDURE — 3078F DIAST BP <80 MM HG: CPT | Mod: CPTII,S$GLB,, | Performed by: INTERNAL MEDICINE

## 2025-02-10 PROCEDURE — 1111F DSCHRG MED/CURRENT MED MERGE: CPT | Mod: CPTII,S$GLB,, | Performed by: INTERNAL MEDICINE

## 2025-02-10 PROCEDURE — 99215 OFFICE O/P EST HI 40 MIN: CPT | Mod: S$GLB,,, | Performed by: INTERNAL MEDICINE

## 2025-02-10 PROCEDURE — 1101F PT FALLS ASSESS-DOCD LE1/YR: CPT | Mod: CPTII,S$GLB,, | Performed by: INTERNAL MEDICINE

## 2025-02-10 PROCEDURE — 1157F ADVNC CARE PLAN IN RCRD: CPT | Mod: CPTII,S$GLB,, | Performed by: INTERNAL MEDICINE

## 2025-02-10 PROCEDURE — 3075F SYST BP GE 130 - 139MM HG: CPT | Mod: CPTII,S$GLB,, | Performed by: INTERNAL MEDICINE

## 2025-02-10 NOTE — ASSESSMENT & PLAN NOTE
Patient currently is on Lasix 40 mg daily.    She is advised to take an extra dose of Lasix on the days her weight increases more than 3 lb.  She has increased 5 lb since hospital discharge.  She is getting an extra dose of 40 mg Lasix today   She has follow up with heart failure clinic and cardiology clinic tomorrow.    No medication changes made at this visit.  Patient can continue to hold losartan as her blood pressure has been stable without it.    Patient given instructions to call our clinic or heart failure clinic whenever they notice an increase in her weight, especially associated with increased shortness of breath.

## 2025-02-10 NOTE — ASSESSMENT & PLAN NOTE
At baseline   continue scheduled inhalers and Supplemental oxygen and monitor respiratory status closely.   Patient has follow up with her pulmonologist

## 2025-02-10 NOTE — PROGRESS NOTES
Clinic Note  2/10/2025      Subjective:       Patient ID:  Misa is a 79 y.o. female being seen for an established visit.    Chief Complaint: Hospital Follow Up    Misa Barajas is a 79 y.o.  female who presents with copd with chronic hypoxic respiratory failure (2L via NC, 24/7), diastolic CHF, CKD stage 3a, aflutter (ablation 6/2024, successful, dr. werner), osteoporosis, hx of L breast cancer (2000, s/p left mastectomy and lymphadenectomy, chemo), hx of HTN (now low), drug induced neuropathy, gallstone pancreatitis(still has the gallbladder), nephroloithiasis, partial R nephrectomy (benign tumor) is here for hospital discharge follow up.  She was discharged on 02/03/2025 after being admitted for COPD exacerbation and heart failure     Patient was admitted for acute on chronic hypoxic respiratory failure, was treated for presumed heart failure and COPD exacerbation.  Started on steroids, nebs and antibiotics in addition to this IV diuresis.  Echocardiogram was done with no evidence of heart failure however elevated pulmonary pressures and CVP, she was continued on diuresis and oxygen was weaned down to baseline, symptoms improved. Patient was back to baseline she has a pulmonologist as an outpatient.  Losartan added to her blood pressure regime     Her family is with her at the appointment today.  They mentioned that her blood pressure bottomed out after she was discharged due to losartan and they stopped giving it to her.  Her pressure has been remaining stable without losartan.  Patient is on her baseline 2 L O2 and her shortness of breath is at baseline.  The family does mentioned that she put on 5 lb since hospital discharge and that they are giving her an extra dose of Lasix today and they have follow up with heart failure clinic tomorrow and Cardiology on Thursday.          Review of Systems   Constitutional:  Negative for chills and fever.   Eyes:  Negative for blurred vision.   Respiratory:   Positive for shortness of breath. Negative for cough and wheezing.    Cardiovascular:  Negative for chest pain and palpitations.   Gastrointestinal:  Negative for heartburn and nausea.   Musculoskeletal:  Negative for myalgias.   Skin:  Negative for rash.   Neurological:  Negative for dizziness.       Medication List with Changes/Refills   Current Medications    ACETAMINOPHEN (TYLENOL) 500 MG TABLET        ALBUTEROL SULFATE (PROAIR RESPICLICK) 90 MCG/ACTUATION INHALER    2 puffs as needed Inhalation every 6 hrs 30 days    ASPIRIN (ECOTRIN) 81 MG EC TABLET    1 tablet Orally Once a day 30 days    BD POSIFLUSH NORMAL SALINE 0.9 INJECTION        BENZONATATE (TESSALON) 100 MG CAPSULE    Take 1 capsule (100 mg total) by mouth 3 (three) times daily as needed for Cough.    BIOTIN 1 MG CAP    1 capsule Orally Once a day 30 days    BUDESONIDE-GLYCOPYR-FORMOTEROL (BREZTRI AEROSPHERE) 160-9-4.8 MCG/ACTUATION HFAA    Inhale 2 puffs into the lungs 2 (two) times daily.    BUSPIRONE (BUSPAR) 5 MG TAB    1 tablet Orally once daily as needed 30 days    FERROUS SULFATE (FEOSOL) 325 MG (65 MG IRON) TAB TABLET    Take 1 tablet (325 mg total) by mouth every other day.    FLUDROCORTISONE (FLORINEF) 0.1 MG TAB    Take 2 tablets (0.2 mg total) by mouth once daily.    FLUTICASONE PROPIONATE (FLONASE) 50 MCG/ACTUATION NASAL SPRAY    1 spray by Each Nostril route daily as needed for Rhinitis or Allergies.    FUROSEMIDE (LASIX) 40 MG TABLET    Take 1 tablet (40 mg total) by mouth once daily.    INHALATION SPACING DEVICE    Use as directed for inhalation.    LEVALBUTEROL (XOPENEX) 1.25 MG/3 ML NEBULIZER SOLUTION    Take 1 ampule by nebulization every 8 (eight) hours as needed for Wheezing or Shortness of Breath.    LOSARTAN (COZAAR) 50 MG TABLET    Take 1 tablet (50 mg total) by mouth once daily.    MAGNESIUM HYDROXIDE 400 MG/5 ML (MILK OF MAGNESIA) 400 MG/5 ML SUSP    30 ml as neded Orally once daily    MECLIZINE (ANTIVERT) 12.5 MG TABLET     "Take 1 tablet (12.5 mg total) by mouth Daily.    METOPROLOL SUCCINATE (TOPROL-XL) 25 MG 24 HR TABLET    Take ½ tablet (12.5 mg total) by mouth once daily.    ONDANSETRON (ZOFRAN) 4 MG TABLET    1 tablet as needed for nausea Orally every 8 hours 7 days    POLYETHYLENE GLYCOL (MIRALAX) 17 GRAM/DOSE POWDER        POTASSIUM CHLORIDE (MICRO-K) 10 MEQ CPSR    Take 2 capsules (20 mEq total) by mouth once daily.    SENNA-DOCUSATE 8.6-50 MG (PERICOLACE) 8.6-50 MG PER TABLET    Take by mouth once daily.    TRAZODONE (DESYREL) 50 MG TABLET    Take 0.5 tablets (25 mg total) by mouth nightly as needed for Insomnia (Anxiety or insomnia).    TRIAMCINOLONE ACETONIDE 0.1% (KENALOG) 0.1 % CREAM    Apply externally to affected area twice a day until clear    VITAMIN D (VITAMIN D3) 1000 UNITS TAB    Take 2 tablets (2,000 Units total) by mouth once daily.           Objective:      /66 (BP Location: Right arm, Patient Position: Sitting)   Pulse 68   Wt 64.9 kg (143 lb 1.3 oz)   SpO2 97%   BMI 25.35 kg/m²   Estimated body mass index is 25.35 kg/m² as calculated from the following:    Height as of 1/30/25: 5' 3" (1.6 m).    Weight as of this encounter: 64.9 kg (143 lb 1.3 oz).  Physical Exam  Constitutional:       Appearance: Normal appearance. She is normal weight.   HENT:      Head: Normocephalic and atraumatic.      Nose: Nose normal.      Mouth/Throat:      Mouth: Mucous membranes are moist.   Eyes:      Pupils: Pupils are equal, round, and reactive to light.   Cardiovascular:      Rate and Rhythm: Normal rate and regular rhythm.      Pulses: Normal pulses.      Heart sounds: Normal heart sounds. No murmur heard.  Pulmonary:      Effort: Pulmonary effort is normal.      Breath sounds: Normal breath sounds.   Abdominal:      General: Bowel sounds are normal.      Palpations: Abdomen is soft.   Skin:     General: Skin is warm.   Neurological:      General: No focal deficit present.      Mental Status: She is alert and oriented " to person, place, and time.      Cranial Nerves: No cranial nerve deficit.   Psychiatric:         Mood and Affect: Mood normal.           Assessment and Plan:         Problem List Items Addressed This Visit          Pulmonary    COPD (chronic obstructive pulmonary disease) - Primary    Current Assessment & Plan     At baseline   continue scheduled inhalers and Supplemental oxygen and monitor respiratory status closely.   Patient has follow up with her pulmonologist            Cardiac/Vascular    Acute on chronic diastolic heart failure    Current Assessment & Plan     Patient currently is on Lasix 40 mg daily.    She is advised to take an extra dose of Lasix on the days her weight increases more than 3 lb.  She has increased 5 lb since hospital discharge.  She is getting an extra dose of 40 mg Lasix today   She has follow up with heart failure clinic and cardiology clinic tomorrow.    No medication changes made at this visit.  Patient can continue to hold losartan as her blood pressure has been stable without it.    Patient given instructions to call our clinic or heart failure clinic whenever they notice an increase in her weight, especially associated with increased shortness of breath.            Follow Up:   No follow-ups on file.    Other Orders Placed This Visit:  No orders of the defined types were placed in this encounter.        Irma Brasher

## 2025-02-11 ENCOUNTER — TELEPHONE (OUTPATIENT)
Dept: CARDIOLOGY | Facility: CLINIC | Age: 80
End: 2025-02-11
Payer: MEDICARE

## 2025-02-11 NOTE — TELEPHONE ENCOUNTER
"[facility]) weekly phone follow up / triage call completed.     T[facility] RN Navigator spoke with PT    Current Patient reported weight:  136 lbs   Patient Goal Weight: (Unsure)  Recent Patient reported blood pressure and heart rate: Marina doesn't remember    Pt reports the following:  []  Shortness of Breath with Activity  []  Shortness of Breath at rest   []  Fatigue  []  Edema   [] Chest pain or tightness  [] Weight Increase since discharge  [x] None of the above  PT c/o Nausea today and wants to reschedule this [facility] appt.  Pt reports using "Daily weight and symptom tracker".    Pt reports being in the Green from Fluid standpoint(color) Zone. If in yellow/red, reminded that they should be calling [facility] today or now.     Medications:   Medication compliance reviewed with pt.  Pt reports having medication list available and has all medications at home for use per list. Marina states PT was given an extra dose of Lasix and saw her Int Med MD yesterday who agreed that she was fluid overloaded.    Education:   Confirmed pt still has "[facility] Home Care Guide Pamphlet"  . YES    Reminded of key points as listed below.     Recommend 2 -3 gram sodium restriction and 1500 cc-2000 cc fluid restriction.  Encourage physical activity with graded exercise program.  Requested patient to weigh themselves daily, and to notify us if their weight increases by more than 3 lbs in 1 day or 5 lbs in 3 days.   Reminded to use "Daily weight and symptom tracker".  Even if pt does not have a scale, to use symptom tracker.       Watch for these Signs and Symptoms: If any of these occur, contact [facility] immediately:   Increase in shortness of breath with movement   Increase in swelling in your legs and ankles   Weight gain of more than 3 pounds in a day or 5 pounds in 3 days.   Difficulty breathing when you are lying down   Worsening fatigue or tiredness   Stomach bloating, a full " feeling or a loss of appetite   Increased coughing--especially when you are lying down      Pt was able to verbalize back to RN in their own words correct diet/fluid restrictions, necessity for exercise, warning signs and symptoms, when and how to contact their [facility] team.    Marina put on hold to reschedule this [facility] appt.  PT reports they will attend 2-18-25 appt.       Pt reminded of how and when to contact [facility]:  [phone](Mon-Fri, 8a-5p) & for urgent issues on the weekend to page the Heart Transplant MD on call.  Pt also encouraged utilize [facility] messaging as well.      Pt's daughter Marina  verbalized understanding and in agreement of plan.     NEHEMIAH advised of assessment data as follows    Daughter Marina Gruber calls to reschedule her Mother's appt. Did a weekly check, WT was up by 5 lbs, PT saw IM yesterday and took extra dose of Lasix and wt now down 4 lbs. I read the note and MD states they have been giving her extra Lasix. PT now scheduled for the 18th, did not check to see if IM had labs ordered. Reason for not coming was nausea. Any further recs?   NEHEMIAH has no further recs.      Will follow up with pt at next clinic visit and RN navigator available for pt questions, issues or concerns.

## 2025-02-11 NOTE — PROGRESS NOTES
[facility] Provider Note (Initial Clinic) Consult Note    Age: 79 y.o.  Gender: female  Number of admissions for CHF within the preceding year: 2-3   Type of Congestive Heart Failure: Diastolic   Etiology: unspecified  Enrolled in Infusion suite: no    Diagnostic Labs:   EKG - 01/30/2025  CXR - 01/30/2025  ECHO - 01/30/2025  Stress test -   Stress echo - 05/13/2022  Pharmacologic stress -   Cardiac catheterization -    Cardiac MRI -     Lab Results   Component Value Date     02/02/2025     (H) 02/01/2025    K 3.6 02/02/2025    K 3.4 (L) 02/01/2025     02/02/2025     02/01/2025    CO2 28 02/02/2025    CO2 28 02/01/2025    GLU 85 02/02/2025    GLU 97 02/01/2025    BUN 44 (H) 02/02/2025    BUN 45 (H) 02/01/2025    CREATININE 1.2 02/02/2025    CREATININE 1.3 02/01/2025    CALCIUM 10.0 02/02/2025    CALCIUM 9.9 02/01/2025    PROT 7.4 01/29/2025    PROT 6.4 12/17/2024    ALBUMIN 3.0 (L) 01/29/2025    ALBUMIN 2.7 (L) 12/17/2024    BILITOT 0.7 01/29/2025    BILITOT 0.5 12/17/2024    ALKPHOS 129 01/29/2025    ALKPHOS 111 12/17/2024    AST 38 01/29/2025    AST 27 12/17/2024    ALT 21 01/29/2025    ALT 12 12/17/2024    ANIONGAP 7 (L) 02/02/2025    ANIONGAP 9 02/01/2025    ESTGFRAFRICA 38.2 (A) 06/17/2022    ESTGFRAFRICA 38.2 (A) 06/17/2022    EGFRNONAA 33.1 (A) 06/17/2022    EGFRNONAA 33.1 (A) 06/17/2022       Lab Results   Component Value Date    WBC 6.77 01/29/2025    WBC 9.56 09/02/2024    RBC 4.56 01/29/2025    RBC 3.14 (L) 09/02/2024    HGB 12.2 01/29/2025    HGB 9.0 (L) 09/02/2024    HCT 38.4 01/29/2025    HCT 29.3 (L) 09/02/2024    MCV 84 01/29/2025    MCV 93 09/02/2024    MCH 26.8 (L) 01/29/2025    MCH 28.7 09/02/2024    MCHC 31.8 (L) 01/29/2025    MCHC 30.7 (L) 09/02/2024    RDW 19.4 (H) 01/29/2025    RDW 17.5 (H) 09/02/2024     01/29/2025     09/02/2024    MPV 9.2 01/29/2025    MPV 10.2 09/02/2024    IMMGR 0.4 01/29/2025    IMMGR 0.3 09/02/2024    IGABS 0.03 01/29/2025    IGABS 0.03  09/02/2024    LYMPH 0.7 (L) 01/29/2025    LYMPH 9.6 (L) 01/29/2025    MONO 0.1 (L) 01/29/2025    MONO 1.3 (L) 01/29/2025    EOS 0.0 01/29/2025    EOS 0.4 09/02/2024    BASO 0.04 01/29/2025    BASO 0.06 09/02/2024    NRBC 0 01/29/2025    NRBC 0 09/02/2024    GRAN 5.9 01/29/2025    GRAN 87.8 (H) 01/29/2025    EOSINOPHIL 0.3 01/29/2025    EOSINOPHIL 3.9 09/02/2024    BASOPHIL 0.6 01/29/2025    BASOPHIL 0.6 09/02/2024    PLTEST Appears normal 07/13/2024    PLTEST Clumped (A) 05/05/2022    ANISO Slight 08/01/2024       Lab Results   Component Value Date    BNP 2,548 (H) 01/29/2025     (H) 07/26/2024    MG 2.1 02/02/2025    MG 2.0 01/30/2025    PHOS 2.5 (L) 09/03/2024    PHOS 1.7 (L) 09/02/2024    TROPONINI 0.024 07/26/2024    TROPONINI 0.028 (H) 05/08/2024    HGBA1C 4.9 01/30/2025    HGBA1C 5.3 05/08/2024    TSH 4.408 (H) 08/26/2024    TSH 4.367 (H) 06/26/2021    FREET4 0.88 08/26/2024    FREET4 0.90 06/26/2021       Lab Results   Component Value Date    IRON 42 08/26/2024    TIBC 308 08/26/2024    FERRITIN 98 06/23/2016    CHOL 119 (L) 07/27/2024    TRIG 46 07/27/2024    HDL 52 07/27/2024    LDLCALC 57.8 (L) 07/27/2024    CHOLHDL 43.7 07/27/2024    TOTALCHOLEST 2.3 07/27/2024    NONHDLCHOL 67 07/27/2024    COLORU Yellow 08/26/2024    APPEARANCEUA Hazy (A) 08/26/2024    PHUR 7.0 08/26/2024    SPECGRAV 1.010 08/26/2024    PROTEINUA Negative 08/26/2024    GLUCUA Negative 08/26/2024    KETONESU Negative 08/26/2024    BILIRUBINUA Negative 08/26/2024    OCCULTUA Negative 08/26/2024    NITRITE Negative 08/26/2024    LEUKOCYTESUR 2+ (A) 08/26/2024       No implanted cardiac devices    Current Outpatient Medications on File Prior to Visit   Medication Sig Dispense Refill    acetaminophen (TYLENOL) 500 MG tablet       albuterol sulfate (PROAIR RESPICLICK) 90 mcg/actuation inhaler 2 puffs as needed Inhalation every 6 hrs 30 days 1 each 0    aspirin (ECOTRIN) 81 MG EC tablet 1 tablet Orally Once a day 30 days 30 tablet 0     BD POSIFLUSH NORMAL SALINE 0.9 injection       benzonatate (TESSALON) 100 MG capsule Take 1 capsule (100 mg total) by mouth 3 (three) times daily as needed for Cough. (Patient not taking: Reported on 2/10/2025) 30 capsule 0    biotin 1 mg Cap 1 capsule Orally Once a day 30 days 30 capsule 0    budesonide-glycopyr-formoterol (BREZTRI AEROSPHERE) 160-9-4.8 mcg/actuation HFAA Inhale 2 puffs into the lungs 2 (two) times daily. 32.1 g 3    busPIRone (BUSPAR) 5 MG Tab 1 tablet Orally once daily as needed 30 days 30 tablet 0    ferrous sulfate (FEOSOL) 325 mg (65 mg iron) Tab tablet Take 1 tablet (325 mg total) by mouth every other day. 45 tablet 2    fludrocortisone (FLORINEF) 0.1 mg Tab Take 2 tablets (0.2 mg total) by mouth once daily. 180 tablet 3    fluticasone propionate (FLONASE) 50 mcg/actuation nasal spray 1 spray by Each Nostril route daily as needed for Rhinitis or Allergies.      furosemide (LASIX) 40 MG tablet Take 1 tablet (40 mg total) by mouth once daily. 90 tablet 3    inhalation spacing device Use as directed for inhalation. 1 each 0    levalbuterol (XOPENEX) 1.25 mg/3 mL nebulizer solution Take 1 ampule by nebulization every 8 (eight) hours as needed for Wheezing or Shortness of Breath.      losartan (COZAAR) 50 MG tablet Take 1 tablet (50 mg total) by mouth once daily. (Patient not taking: Reported on 2/10/2025) 90 tablet 3    magnesium hydroxide 400 mg/5 ml (MILK OF MAGNESIA) 400 mg/5 mL Susp 30 ml as neded Orally once daily      meclizine (ANTIVERT) 12.5 mg tablet Take 1 tablet (12.5 mg total) by mouth Daily. 90 tablet 3    metoprolol succinate (TOPROL-XL) 25 MG 24 hr tablet Take ½ tablet (12.5 mg total) by mouth once daily. 45 tablet 3    ondansetron (ZOFRAN) 4 MG tablet 1 tablet as needed for nausea Orally every 8 hours 7 days 21 tablet 0    polyethylene glycol (MIRALAX) 17 gram/dose powder       potassium chloride (MICRO-K) 10 MEQ CpSR Take 2 capsules (20 mEq total) by mouth once daily. 180 capsule  2    senna-docusate 8.6-50 mg (PERICOLACE) 8.6-50 mg per tablet Take by mouth once daily.      traZODone (DESYREL) 50 MG tablet Take 0.5 tablets (25 mg total) by mouth nightly as needed for Insomnia (Anxiety or insomnia).      triamcinolone acetonide 0.1% (KENALOG) 0.1 % cream Apply externally to affected area twice a day until clear (Patient not taking: Reported on 2/10/2025) 80 g 1    vitamin D (VITAMIN D3) 1000 units Tab Take 2 tablets (2,000 Units total) by mouth once daily. 180 tablet 3     No current facility-administered medications on file prior to visit.         HPI:  Patient reports SOB now returned to baseline, wearing 2L supplemental O2. Presents to clinic in wheelchair   Patient sleeps in recliner at baseline for the past year or so   Patient wakes up SOB, has to get out of bed, associated cough- denies PND symptoms    Palpitations - denies   Dizzy, light-headed, pre-syncope or syncope- intermittent dizziness/pre-syncope with episodes of hypotension. Denies syncope. On midodrine 2.5mg TID for hypotension   Since discharge frequency of performing weights, home weight and weight change- performing daily weights. Weight ranging between 135-138lbs since discharge. 138lbs today. Does endorse chronic BLE edema, wearing compression stockings. Elevating legs in recliner but has not been able to elevate above heart.   Other information felt pertinent to HPI: Ms. Misa Barajas is a 78 yo female with COPD on 2L home O2, HFpEF, AFL s/p RFA, CKD III, HTN, HLD [facility] admission for AHRF 2/2 ADHF and COPD exacerbation. On ED presentation BNP elevated to 2500, troponin elevated to 19, CXR with pulmonary edema. She was started on steroids, nebs and antibiotics in addition to IV diuresis. Updated TTE stable from prior, showing HFpEF with elevated CVP 15. Her O2 requirements improved to baseline with diuresis and she was subsequently discharged.     PHYSICAL:   Vitals:     02/18/25 0958   BP: (!) 119/58   Pulse: 69      @[id number](3)@    JVD: no   Heart rhythm: regular  Cardiac murmur: No    S3: no  S4: no  Lungs:  diminished breath sounds but no audible crackles/wheezing  Hepatojugular reflux: no  Edema: yes, 1+ BLE edema with R>L most prominent in ankles. Wearing compression stockings bilaterally    Echo 1/30/25:    Left Ventricle: The left ventricle is normal in size. Normal wall thickness. Normal wall motion. There is normal systolic function. Ejection fraction is approximately 55%.    Right Ventricle: Normal right ventricular cavity size. Wall thickness is normal. Systolic function is borderline low.    Left Atrium: Left atrium is severely dilated.    Mitral Valve: There is moderate posterior mitral annular calcification present.    Tricuspid Valve: There is mild regurgitation.    Pulmonary Artery: The estimated pulmonary artery systolic pressure is 51 mmHg.    IVC/SVC: Elevated venous pressure at 15 mmHg.    ASSESSMENT/PLAN:    1. Chronic diastolic heart failure  -NYHA Class III symptoms. BLE edema in setting of venous insufficiency but otherwise appears well compensated on exam.  -Lasix 40mg daily changed to bumex 1mg daily with 1mg BID prn last week per Gen cards. Currently taking bumex 1mg BID.  -Recommend 2-3 gram sodium restriction and 1500cc- 2000cc fluid restriction.  -Requested patient to weigh themselves daily, and to notify us if their weight increases by more than 3 lbs in 1 day or 5 lbs in 3 days.  -Will coordinate follow up pending labs today.    2. Orthostatic hypotension   -On midodrine 2.5mg TID  -Continue daily blood pressure logs     3. Venous insufficiency of both lower extremities  -Recommend periodic leg elevation and wearing compression stockings when keeping feet dependent    4. Stage 3 chronic kidney disease, unspecified whether stage 3a or 3b CKD   -Cr 1.4, at baseline. Established with Nephrology    Arleth Bell PA-C

## 2025-02-13 ENCOUNTER — OFFICE VISIT (OUTPATIENT)
Dept: CARDIOLOGY | Facility: CLINIC | Age: 80
End: 2025-02-13
Payer: MEDICARE

## 2025-02-13 ENCOUNTER — TELEPHONE (OUTPATIENT)
Dept: PRIMARY CARE CLINIC | Facility: CLINIC | Age: 80
End: 2025-02-13
Payer: MEDICARE

## 2025-02-13 ENCOUNTER — PATIENT MESSAGE (OUTPATIENT)
Dept: ADMINISTRATIVE | Facility: CLINIC | Age: 80
End: 2025-02-13
Payer: MEDICARE

## 2025-02-13 VITALS
HEIGHT: 64 IN | HEART RATE: 67 BPM | DIASTOLIC BLOOD PRESSURE: 56 MMHG | BODY MASS INDEX: 23.85 KG/M2 | OXYGEN SATURATION: 95 % | SYSTOLIC BLOOD PRESSURE: 108 MMHG | WEIGHT: 139.69 LBS

## 2025-02-13 DIAGNOSIS — I48.0 PAROXYSMAL ATRIAL FIBRILLATION: ICD-10-CM

## 2025-02-13 DIAGNOSIS — I95.9 HYPOTENSION, UNSPECIFIED HYPOTENSION TYPE: ICD-10-CM

## 2025-02-13 DIAGNOSIS — I50.32 CHRONIC HEART FAILURE WITH PRESERVED EJECTION FRACTION: Primary | ICD-10-CM

## 2025-02-13 DIAGNOSIS — I70.0 AORTIC ATHEROSCLEROSIS: ICD-10-CM

## 2025-02-13 RX ORDER — BUMETANIDE 1 MG/1
1 TABLET ORAL 2 TIMES DAILY
Qty: 180 TABLET | Refills: 3 | Status: SHIPPED | OUTPATIENT
Start: 2025-02-13 | End: 2026-02-13

## 2025-02-13 RX ORDER — MIDODRINE HYDROCHLORIDE 2.5 MG/1
2.5 TABLET ORAL 3 TIMES DAILY
Qty: 270 TABLET | Refills: 3 | Status: SHIPPED | OUTPATIENT
Start: 2025-02-13 | End: 2026-02-13

## 2025-02-13 NOTE — TELEPHONE ENCOUNTER
[employer] message inquiring if home health started for patient and request for name of home health company.

## 2025-02-13 NOTE — TELEPHONE ENCOUNTER
----- Message from GINGER Linton sent at 2/7/2025  5:05 PM CST -----  FU with  HH admit,  to call with HH services.

## 2025-02-13 NOTE — PROGRESS NOTES
Pt stable for d/c. IV discontinued prior to discharge. Patient left with all belongings. 
Patient was wheeled down to lobby with family member present. Patient left in private 
vehicle to go home. D/c @ 12:50 Subjective:   @Patient ID:  Misa Barajas is a 79 y.o. female who presents for evaluation of No chief complaint on file.      HPI:     2/13/25- here with her daughters who takes great care.   Here for follow up post hospital discharged for ADHF. Was recently given steroids for low BP- subsequently started retaining fluids and become SOB. She has a fixed stroke stroke volume and episode of of HBP can make her SOB.She also noticed that her lasix is not working well as before. Today, she is feeling better.       11/5/24  After las visit she was admitted multiple times for pasteurella bacteremia and GS pancreatitis with good recovery. She feel good and is tolerating meds w/o s/e. Today, she voiced no CV complaints/ Tolerating meds w/o side effects. Long discussion about DNR status and I thinks she needs further assessment and guidance from palliative team to have an educated opinion. Denies cp, sob, palpitations, presyncope/dizziness, syncope, orthopnea, PND, bendopnea, decrease ET, NVDC, fever, chills.         78 y.o. female with PMH of COPD, HFpEF (60% on 5/4/22), pAF (eliquis), RFA for typical CCW AAF,  HTN, HLD, COPD on home O2,  presenting to clinic to established care. She also endorses new redness and warmth with associated itching to her RLE. She has small pustules that she noticed to RLE a few days ago. Denies fever, chills, chest pain, cough, SOB, abdominal pain, n/v/d, dysuria, headaches. Has chronic discoloration to BLEs without recent worsening.       [place] [facility] [place]   Misa Barajas is a 78 y.o. F admitted to hospital medicine for syncope and orthostatic hypotension. GI was consulted for symptomatic anemia. CBC was trended and remained stable after 1unit pRBC. There was no melena, hematochezia, hematemesis, or other signs of active bleeding. GI recommended PPI, but no endoscopy. Telemetry monitoring did not show any afib or flutter. EKG did reveal a bifascicular block.  Cardiology curbsided, and bifascicular block felt very unlikely to be contributing to lightheadeness/syncope. Home antihypertensives were held. Infectious workup negative. She received additional 1L IVF on HD1 and HD2. Orthostatic vitals were monitored each day, and the degree of drop in SBP going from laying to sitting continued to improve. Notably, on echo her CVP was 3 mmHg, consistent with intravascular volume depletion. No signs of acute decompensated heart failure (SOB, leg swelling). She continued to feel lightheaded with going from laying to sitting. With ongoing fluid resuscitation and one dose of fludrocortisone, the lightheadedness with sitting/standing eventually resolved and SBP only dropped 4 mmHg going from laying to sitting. Dehydration and intravascular volume depletion is most likely etiology for orthostasis, in setting of recent hospitalization (ERCP and sphincterotomy for gallstone pancreatitis) and diuretic use outpatient, and likely reduced PO intake. With ongoing fluid resuscitation, Hgb slowly downtrended along with WBC and platelet count. Consistent with hemodilution. No signs of bleeding. Continued to monitor CBC. Fludricortisone was discontinued and began to reintroduce home meds, starting with Metoprolol. Leeper she has a history of orthostatic hypotension in the past that resolved after stopping Losartan, however Losartan was resumed after most recent hospitalization. Will discontinue this medication. She was determined to be stable for discharge, and then she developed shortness of breath and anxiety. Anxiety improved with PRN Vistaril. Exam was notable for volume overload (BLE 2+ pitting edema, rales). Diureses with IV lasix. CXR ordered and showed pulmonary interstitial edema. Pt improved with diuresis. Got trazodone for trouble sleeping and anxiety, which helped. She was determined to be medically stable for discharge to [facility].      She has had multiple admissions over the  last couple of months includin/7- HF exacerbation, AIN, E.faecalis UTI.   - admission for cellulitis and pastruella bacteremia s/p 2 week treatment w/ unaysyn and doxycline.  - admission for gallstone pancreatitis w/ possible early cholecystitis s/p ERCP w/ sphincterotomy on .     I spoke with the patient's daughter over the phone Marina. She states the patient began having symptoms of dizziness around 3 weeks ago. She noticed it began after her ERCP w/ sphincterotomy. While in rehab the daughter states patient has been persistently orthostatic with low blood pressures and dizziness with standing. The SNF attempted to use increasing doses midodrine and meclizine however she continued to be dizzy and orthostatic.           Echocardiogram  TTE 3/2/2024    Left Ventricle: The left ventricle is normal in size. Normal wall thickness. Normal wall motion. There is normal systolic function with a visually estimated ejection fraction of 60 - 65%. Grade II diastolic dysfunction.    Right Ventricle: Normal right ventricular cavity size. Wall thickness is normal. Right ventricle wall motion  is normal. Systolic function is normal.    Pulmonary Artery: The estimated pulmonary artery systolic pressure is 37 mmHg.    IVC/SVC: Normal venous pressure at 3 mmHg.     Stress testing  Nuclear stress test 2022    Equivocal myocardial perfusion scan.    There is a mild to moderate intensity, small to moderate sized, equivocal perfusion abnormality that is fixed in the inferior and inferolateral wall(s). This finding is equivocal due to bowel interference.    There are no other significant perfusion abnormalities.    The visually estimated ejection fraction is normal at rest and normal during stress.    There is normal wall motion at rest and post stress.    LV cavity size is normal at rest and normal at stress.    The EKG portion of this study is negative for ischemia.    The patient reported no chest pain  during the stress test.    There were no arrhythmias during stress.    When compared to the previous study from 4/15/2021, the inferior wall defect is now present on stress and rest images. There is underlying bowel which may be interfering.    Patient Active Problem List    Diagnosis Date Noted    Hypoalbuminemia 12/18/2024    Encounter to establish care with new doctor 12/17/2024    Iron deficiency anemia due to chronic blood loss 12/17/2024    Typical atrial flutter 09/24/2024    Orthostatic hypotension 08/26/2024    Yeast infection 08/09/2024    Left arm swelling 08/07/2024    Leukocytosis 08/06/2024    Acute pancreatitis 07/27/2024    Edema 07/27/2024    Cellulitis 07/12/2024    Gram-negative bacteremia - Pasturella multocida 07/12/2024    Acute blood loss anemia 07/12/2024     POA, symptomatic, s/p transfusion      Leucocytosis 07/12/2024    Former heavy tobacco smoker 06/19/2024    Acute on chronic congestive heart failure 05/30/2024    Hypercoagulability due to atrial fibrillation 05/28/2024    Hypotension 05/20/2024    Class 1 obesity due to excess calories with serious comorbidity and body mass index (BMI) of 30.0 to 30.9 in adult 05/16/2024    Acute interstitial nephritis 05/15/2024    Hypermagnesemia 05/12/2024    Urinary retention 05/10/2024    Hypophosphatemia 05/10/2024    Elevated troponin 05/08/2024    Wound of right lower extremity 05/08/2024    Acute cystitis 05/08/2024    Diastolic CHF, chronic 05/07/2024    Unspecified inflammatory spondylopathy, multiple sites in spine 03/13/2024    Hypokalemia 03/02/2024    Hypomagnesemia 03/02/2024    Senile purpura 09/21/2022    Osteoporosis 08/22/2022    COPD (chronic obstructive pulmonary disease) 08/22/2022    Epigastric abdominal pain 05/12/2022    Right sided hydroureteronephrosis 05/04/2022    Chronic anticoagulation 05/04/2022    Right ureteral stone 04/28/2022    Lymphedema of both lower extremities 01/25/2022    Venous insufficiency of both lower  "extremities 01/25/2022    Venous stasis dermatitis of both lower extremities 01/25/2022    Chronic diastolic heart failure 12/05/2021    Atelectasis 06/28/2021     CXR on admit with atelectasis and pleural effusions, continue to diurese and monitor vitals       Chronic respiratory failure with hypoxia 06/26/2021     POA  Home O2- 2L      Pleural effusion 06/25/2021     CXR with bilateral pleural effusions  Supplemental O2 at 5L  Continue to diurese       Centrilobular emphysema 06/22/2021    Acute on chronic diastolic heart failure 12/24/2019    Varicose veins of both legs with edema 08/28/2019    Overweight with body mass index (BMI) of 27 to 27.9 in adult 08/22/2019    CKD (chronic kidney disease) stage 3, GFR 30-59 ml/min 02/14/2018    Breast calcification, right 07/20/2017    Calcified granuloma of lung 05/30/2017     Seen on renal stone CT dated 05/30/17      RBBB 05/17/2017    ACP (advance care planning) 04/24/2017    Hyperlipidemia 11/20/2016    Aortic atherosclerosis 08/08/2016     As seen on imaging dated 08/09/10      Osteoarthritis of knee 01/14/2016    Renal osteodystrophy 01/14/2016    Secondary hyperparathyroidism of renal origin 11/27/2015    Drug-induced polyneuropathy 11/27/2015    Arthritis of facet joints at multiple vertebral levels 07/24/2015     Seen on CT renal stone dated 07/24/15      HTN (hypertension) 05/28/2015    Nephrolithiasis 06/02/2014                    LABS  CBC  @LABRCNTIP(WBC:3,RBC:3,HGB:3,HCT:3,PLT:3,MCV:3,MCH:3,MCHC:3)@  BMP  @LABRCNTIP(NA:3,K:3,CO2:3,CL:3,BUN:3,Creatinine:3,GLU:3,GFR:3)@    POCT-Glucose  No results found for: "POCTGLUCOSE"    @LABRCNTIP(Calcium:3,MG:3,PHOS:3)@  LFT  @LABRCNTIP(PROT:3,Albumin:3,,Bilitot:3,AST:3,Alkphos:3,ALT:3)@  GFR     COAGS  @LABRCNTIP(PT:3,INR:3,APTT:3)@  CE  @LABRCNTIP(troponini:3,cktotal:3,ckmb:3)@  ABGs  @[id number](PH,PCO2,PO2,HCO3,POCSATURATED,BE)@  BNP  @LABRCNTIP(BNP:3)@    LAST HbA1c  Lab Results   Component Value Date    HGBA1C " 4.9 01/30/2025       Lipid panel  Lab Results   Component Value Date    CHOL 119 (L) 07/27/2024    CHOL 148 08/21/2023    CHOL 150 02/15/2023     Lab Results   Component Value Date    HDL 52 07/27/2024    HDL 66 08/21/2023    HDL 75 02/15/2023     Lab Results   Component Value Date    LDLCALC 57.8 (L) 07/27/2024    LDLCALC 67.0 08/21/2023    LDLCALC 60.6 (L) 02/15/2023     Lab Results   Component Value Date    TRIG 46 07/27/2024    TRIG 75 08/21/2023    TRIG 72 02/15/2023     Lab Results   Component Value Date    CHOLHDL 43.7 07/27/2024    CHOLHDL 44.6 08/21/2023    CHOLHDL 50.0 02/15/2023            Review of Systems   Cardiovascular:  Positive for leg swelling.   All other systems reviewed and are negative.      Objective:   General Appearance: Pleasant, NAD  Eyes: PERRL, EOMI, anicteric  ENT: MMM w/o erythema  Neck: Supple, JVP < 7cm w/o HJR, 2+ carotid pulses, no bruits.  Lungs: Normal inspiratory effort, clear to auscultation bilaterally.  Heart: Regular rate & rhythm, normal S1 and S2, no murmurs, rubs, or gallops.  Abdomen: Soft, non-tender, non-distended. Normal bowel sounds.   Skin/Extremities: +edema, gauze  Peripheral pulses: 2+ radial, dorsalis pedis and posterior tibialis bilaterally.  Neurologic: No gross cranial nerve or focal neurologic deficits.  Psychiatric: Appropriate, normal affect.        Assessment:     1. Chronic heart failure with preserved ejection fraction    2. Hypotension, unspecified hypotension type    3. Paroxysmal atrial fibrillation    4. Aortic atherosclerosis            Plan:       79 y.o. female with PMH of COPD, HFpEF (60% on 5/4/22), pAF (eliquis), HTN, HLD presenting to clinic for follow up post hospital discharge with SOB likely 2/2 ADHF.      #HFpEF:  Seems volume up today. Reports decreased response to Lasix PO diuretics at home. Stop lasix and start Bumex 1 mg qd (prescribed BID if she an extra dose). Try to avoid steroids in general specially hydrocortisone for low BP.  -  Start midodrine 2.5  mg TID prn  - Continue Toprol 12.5 mg qd   - Stop losartan   - Monitor BP     #pAFib:  CV 6, HB 1. No falls   - AC with eliquis 5mg BID  - continue rate control with metoprolol suc 12.5 mg qd       #HTN:  BP at goal  - Continue current management  - keep a BP diary for next visit     #HLD:  Last lipid panel in 6/2021: chol 140, tri 128, HDL 65, LDL 66    # PVD- compression stocking, diuretics  Weight loss  Exercise    Continue with current medical plan and lifestyle changes.  Return sooner for concerns or questions. If symptoms persist go to the ED  I have reviewed all pertinent data on this patient       She expressed verbal understanding and agreed with the plan      Follow up as scheduled. Return sooner for concerns or questions      I have reviewed the patient's medical history in detail and updated the computerized patient record.    No orders of the defined types were placed in this encounter.      Follow up as scheduled. Return sooner for concerns or questions            She expressed verbal understanding and agreed with the plan        Patient's Medications   New Prescriptions    BUMETANIDE (BUMEX) 1 MG TABLET    Take 1 tablet (1 mg total) by mouth 2 (two) times a day.    MIDODRINE (PROAMATINE) 2.5 MG TAB    Take 1 tablet (2.5 mg total) by mouth 3 (three) times daily.   Previous Medications    ACETAMINOPHEN (TYLENOL) 500 MG TABLET        ALBUTEROL SULFATE (PROAIR RESPICLICK) 90 MCG/ACTUATION INHALER    2 puffs as needed Inhalation every 6 hrs 30 days    ASPIRIN (ECOTRIN) 81 MG EC TABLET    1 tablet Orally Once a day 30 days    BD POSIFLUSH NORMAL SALINE 0.9 INJECTION        BIOTIN 1 MG CAP    1 capsule Orally Once a day 30 days    BUDESONIDE-GLYCOPYR-FORMOTEROL (BREZTRI AEROSPHERE) 160-9-4.8 MCG/ACTUATION HFAA    Inhale 2 puffs into the lungs 2 (two) times daily.    BUSPIRONE (BUSPAR) 5 MG TAB    1 tablet Orally once daily as needed 30 days    FERROUS SULFATE (FEOSOL) 325 MG (65 MG IRON)  TAB TABLET    Take 1 tablet (325 mg total) by mouth every other day.    FLUTICASONE PROPIONATE (FLONASE) 50 MCG/ACTUATION NASAL SPRAY    1 spray by Each Nostril route daily as needed for Rhinitis or Allergies.    INHALATION SPACING DEVICE    Use as directed for inhalation.    LEVALBUTEROL (XOPENEX) 1.25 MG/3 ML NEBULIZER SOLUTION    Take 1 ampule by nebulization every 8 (eight) hours as needed for Wheezing or Shortness of Breath.    MAGNESIUM HYDROXIDE 400 MG/5 ML (MILK OF MAGNESIA) 400 MG/5 ML SUSP    30 ml as neded Orally once daily    MECLIZINE (ANTIVERT) 12.5 MG TABLET    Take 1 tablet (12.5 mg total) by mouth Daily.    METOPROLOL SUCCINATE (TOPROL-XL) 25 MG 24 HR TABLET    Take ½ tablet (12.5 mg total) by mouth once daily.    ONDANSETRON (ZOFRAN) 4 MG TABLET    1 tablet as needed for nausea Orally every 8 hours 7 days    POLYETHYLENE GLYCOL (MIRALAX) 17 GRAM/DOSE POWDER        POTASSIUM CHLORIDE (MICRO-K) 10 MEQ CPSR    Take 2 capsules (20 mEq total) by mouth once daily.    SENNA-DOCUSATE 8.6-50 MG (PERICOLACE) 8.6-50 MG PER TABLET    Take by mouth once daily.    TRAZODONE (DESYREL) 50 MG TABLET    Take 0.5 tablets (25 mg total) by mouth nightly as needed for Insomnia (Anxiety or insomnia).    TRIAMCINOLONE ACETONIDE 0.1% (KENALOG) 0.1 % CREAM    Apply externally to affected area twice a day until clear    VITAMIN D (VITAMIN D3) 1000 UNITS TAB    Take 2 tablets (2,000 Units total) by mouth once daily.   Modified Medications    No medications on file   Discontinued Medications    FLUDROCORTISONE (FLORINEF) 0.1 MG TAB    Take 2 tablets (0.2 mg total) by mouth once daily.    FUROSEMIDE (LASIX) 40 MG TABLET    Take 1 tablet (40 mg total) by mouth once daily.    LOSARTAN (COZAAR) 50 MG TABLET    Take 1 tablet (50 mg total) by mouth once daily.        Valerio Aquino MD  Cardiovascular Disease [facility]

## 2025-02-14 ENCOUNTER — TELEPHONE (OUTPATIENT)
Dept: ADMINISTRATIVE | Facility: CLINIC | Age: 80
End: 2025-02-14
Payer: MEDICARE

## 2025-02-17 ENCOUNTER — TELEPHONE (OUTPATIENT)
Dept: ADMINISTRATIVE | Facility: CLINIC | Age: 80
End: 2025-02-17
Payer: MEDICARE

## 2025-02-17 ENCOUNTER — OFFICE VISIT (OUTPATIENT)
Dept: HOME HEALTH SERVICES | Facility: CLINIC | Age: 80
End: 2025-02-17
Payer: MEDICARE

## 2025-02-17 VITALS
SYSTOLIC BLOOD PRESSURE: 113 MMHG | HEIGHT: 64 IN | BODY MASS INDEX: 23.56 KG/M2 | WEIGHT: 138 LBS | DIASTOLIC BLOOD PRESSURE: 65 MMHG

## 2025-02-17 DIAGNOSIS — N18.32 STAGE 3B CHRONIC KIDNEY DISEASE: ICD-10-CM

## 2025-02-17 DIAGNOSIS — J96.11 CHRONIC RESPIRATORY FAILURE WITH HYPOXIA: Chronic | ICD-10-CM

## 2025-02-17 DIAGNOSIS — I48.0 PAF (PAROXYSMAL ATRIAL FIBRILLATION): ICD-10-CM

## 2025-02-17 DIAGNOSIS — I89.0 LYMPHEDEMA OF BOTH LOWER EXTREMITIES: ICD-10-CM

## 2025-02-17 DIAGNOSIS — Z99.89 DEPENDENCE ON OTHER ENABLING MACHINES AND DEVICES: ICD-10-CM

## 2025-02-17 DIAGNOSIS — I10 PRIMARY HYPERTENSION: ICD-10-CM

## 2025-02-17 DIAGNOSIS — Z99.81 DEPENDENCE ON SUPPLEMENTAL OXYGEN: ICD-10-CM

## 2025-02-17 DIAGNOSIS — I50.32 CHRONIC DIASTOLIC HEART FAILURE: ICD-10-CM

## 2025-02-17 DIAGNOSIS — J43.2 CENTRILOBULAR EMPHYSEMA: ICD-10-CM

## 2025-02-17 DIAGNOSIS — E78.5 HYPERLIPIDEMIA, UNSPECIFIED HYPERLIPIDEMIA TYPE: ICD-10-CM

## 2025-02-17 DIAGNOSIS — N25.81 SECONDARY HYPERPARATHYROIDISM OF RENAL ORIGIN: ICD-10-CM

## 2025-02-17 DIAGNOSIS — Z00.00 ENCOUNTER FOR MEDICARE ANNUAL WELLNESS EXAM: Primary | ICD-10-CM

## 2025-02-17 DIAGNOSIS — Z00.00 ENCOUNTER FOR PREVENTIVE HEALTH EXAMINATION: ICD-10-CM

## 2025-02-17 PROBLEM — S81.801A WOUND OF RIGHT LOWER EXTREMITY: Status: RESOLVED | Noted: 2024-05-08 | Resolved: 2025-02-17

## 2025-02-17 PROCEDURE — 1123F ACP DISCUSS/DSCN MKR DOCD: CPT | Mod: CPTII,95,, | Performed by: NURSE PRACTITIONER

## 2025-02-17 PROCEDURE — 1101F PT FALLS ASSESS-DOCD LE1/YR: CPT | Mod: CPTII,95,, | Performed by: NURSE PRACTITIONER

## 2025-02-17 PROCEDURE — 3078F DIAST BP <80 MM HG: CPT | Mod: CPTII,95,, | Performed by: NURSE PRACTITIONER

## 2025-02-17 PROCEDURE — 1159F MED LIST DOCD IN RCRD: CPT | Mod: CPTII,95,, | Performed by: NURSE PRACTITIONER

## 2025-02-17 PROCEDURE — 1126F AMNT PAIN NOTED NONE PRSNT: CPT | Mod: CPTII,95,, | Performed by: NURSE PRACTITIONER

## 2025-02-17 PROCEDURE — 3074F SYST BP LT 130 MM HG: CPT | Mod: CPTII,95,, | Performed by: NURSE PRACTITIONER

## 2025-02-17 PROCEDURE — 3288F FALL RISK ASSESSMENT DOCD: CPT | Mod: CPTII,95,, | Performed by: NURSE PRACTITIONER

## 2025-02-17 PROCEDURE — G0439 PPPS, SUBSEQ VISIT: HCPCS | Mod: 95,,, | Performed by: NURSE PRACTITIONER

## 2025-02-17 PROCEDURE — 1160F RVW MEDS BY RX/DR IN RCRD: CPT | Mod: CPTII,95,, | Performed by: NURSE PRACTITIONER

## 2025-02-17 NOTE — PROGRESS NOTES
"The patient location is: [place]  The chief complaint leading to consultation is: Health Risk Assessment    Visit type: audiovisual    Face to Face time with patient: 20  35 minutes of total time spent on the encounter, which includes face to face time and non-face to face time preparing to see the patient (eg, review of tests), Obtaining and/or reviewing separately obtained history, Documenting clinical information in the electronic or other health record, Independently interpreting results (not separately reported) and communicating results to the patient/family/caregiver, or Care coordination (not separately reported).         Each patient to whom he or she provides medical services by telemedicine is:  (1) informed of the relationship between the physician and patient and the respective role of any other health care provider with respect to management of the patient; and (2) notified that he or she may decline to receive medical services by telemedicine and may withdraw from such care at any time.    Notes:                         Misa Barajas presented for a follow-up Medicare AWV today. The following components were reviewed and updated:    Medical history  Family History  Social history  Allergies and Current Medications  Health Risk Assessment  Health Maintenance  Care Team    **See Completed Assessments for Annual Wellness visit with in the encounter summary    The following assessments were completed:  Depression Screening  Cognitive function Screening  Timed Get Up Test  Whisper Test      Opioid documentation:      Patient does not have a current opioid prescription.          Vitals:    02/17/25 1200   BP: 113/65   Patient Position: Sitting   Weight: 62.6 kg (138 lb)   Height: 5' 4" (1.626 m)     Body mass index is 23.69 kg/m².       Physical Exam  Constitutional:       Appearance: Normal appearance.   Neurological:      General: No focal deficit present.      Mental Status: She is alert and oriented " to person, place, and time.           Diagnoses and health risks identified today and associated recommendations/orders:  1. Encounter for Medicare annual wellness exam  - Referral to Enhanced Annual Wellness Visit (eAWV) W+1    2. Encounter for preventive health examination  Assessments completed. Preventive measures, health maintenance, and immunizations reviewed with patient.    3. Chronic respiratory failure with hypoxia  Stable, patient on home oxygen. Followed by Pulmonary.    4. Centrilobular emphysema  Stable, patient on Albuterol HFA, Breztri HFA, and Xopenex tx's. Followed by Pulmonary.    5. Chronic diastolic heart failure  Stable, patient on Bumex. Followed by Cardiology.    6. PAF (paroxysmal atrial fibrillation)  Stable, patient on Asprin and Toprol-XL. Followed by Cardiology.    7. Stage 3b chronic kidney disease  Stable, followed by PCP.    8. Secondary hyperparathyroidism of renal origin  Stable, followed by PCP.    9. Primary hypertension  Stable, patient on Torpol-XL. Followed by PCP.    10. Hyperlipidemia, unspecified hyperlipidemia type  Stable, followed by PCP.    11. Lymphedema of both lower extremities  Stable, followed by PCP and Cardiology.    12. Dependence on supplemental oxygen  Stable, followed by Pulmonary.    13. Dependence on other enabling machines and devices  Stable, followed by PCP.       Provided Misa with a 5-10 year written screening schedule and personal prevention plan. Recommendations were developed using the USPSTF age appropriate recommendations. Education, counseling, and referrals were provided as needed.  After Visit Summary printed and given to patient which includes a list of additional screenings\tests needed.    Follow up in about 1 year (around 2/17/2026) for your next annual wellness visit.      Neetu Pate NP  I offered to discuss advanced care planning, including how to pick a person who would make decisions for you if you were unable to make them for  yourself, called a health care power of , and what kind of decisions you might make such as use of life sustaining treatments such as ventilators and tube feeding when faced with a life limiting illness recorded on a living will that they will need to know. (How you want to be cared for as you near the end of your natural life)     X  Patient has advanced directives on file, which we reviewed, and they do not wish to make changes.

## 2025-02-17 NOTE — PATIENT INSTRUCTIONS
Counseling and Referral of Other Preventative  (Italic type indicates deductible and co-insurance are waived)    Patient Name: Misa Barajas  Today's Date: 2/17/2025    Health Maintenance       Date Due Completion Date    RSV Vaccine (Age 60+ and Pregnant patients) (1 - 1-dose 75+ series) Never done ---    COVID-19 Vaccine (5 - 2024-25 season) 11/13/2024 9/18/2024    TETANUS VACCINE 08/08/2026 8/8/2016    Lipid Panel 07/27/2029 7/27/2024        No orders of the defined types were placed in this encounter.    The following information is provided to all patients.  This information is to help you find resources for any of the problems found today that may be affecting your health:                  Living healthy guide: www.[facility].[place].gov      Understanding Diabetes: www.diabetes.org      Eating healthy: www.cdc.gov/healthyweight      [facility] home safety checklist: www.cdc.gov/steadi/patient.html      Agency on Aging: www.goea.[place].gov      Alcoholics anonymous (AA): www.aa.org      Physical Activity: www.ayesha.nih.gov/mj5afpo      Tobacco use: www.quitwithusla.org

## 2025-02-18 ENCOUNTER — DOCUMENTATION ONLY (OUTPATIENT)
Dept: CARDIOLOGY | Facility: CLINIC | Age: 80
End: 2025-02-18

## 2025-02-18 ENCOUNTER — OFFICE VISIT (OUTPATIENT)
Dept: CARDIOLOGY | Facility: CLINIC | Age: 80
End: 2025-02-18
Payer: MEDICARE

## 2025-02-18 ENCOUNTER — LAB VISIT (OUTPATIENT)
Dept: LAB | Facility: HOSPITAL | Age: 80
End: 2025-02-18
Payer: MEDICARE

## 2025-02-18 ENCOUNTER — TELEPHONE (OUTPATIENT)
Dept: CARDIOLOGY | Facility: CLINIC | Age: 80
End: 2025-02-18

## 2025-02-18 VITALS
HEIGHT: 64 IN | BODY MASS INDEX: 24.1 KG/M2 | WEIGHT: 141.13 LBS | DIASTOLIC BLOOD PRESSURE: 58 MMHG | SYSTOLIC BLOOD PRESSURE: 119 MMHG | HEART RATE: 69 BPM | OXYGEN SATURATION: 98 %

## 2025-02-18 DIAGNOSIS — R06.02 SOB (SHORTNESS OF BREATH): ICD-10-CM

## 2025-02-18 DIAGNOSIS — I50.32 CHRONIC DIASTOLIC HEART FAILURE: Primary | ICD-10-CM

## 2025-02-18 DIAGNOSIS — I95.1 ORTHOSTATIC HYPOTENSION: ICD-10-CM

## 2025-02-18 DIAGNOSIS — N18.30 STAGE 3 CHRONIC KIDNEY DISEASE, UNSPECIFIED WHETHER STAGE 3A OR 3B CKD: Chronic | ICD-10-CM

## 2025-02-18 DIAGNOSIS — I87.2 VENOUS INSUFFICIENCY OF BOTH LOWER EXTREMITIES: ICD-10-CM

## 2025-02-18 LAB
ALBUMIN SERPL BCP-MCNC: 2.8 G/DL (ref 3.5–5.2)
ALP SERPL-CCNC: 134 U/L (ref 40–150)
ALT SERPL W/O P-5'-P-CCNC: 19 U/L (ref 10–44)
ANION GAP SERPL CALC-SCNC: 7 MMOL/L (ref 8–16)
AST SERPL-CCNC: 32 U/L (ref 10–40)
BASOPHILS # BLD AUTO: 0.07 K/UL (ref 0–0.2)
BASOPHILS NFR BLD: 1.2 % (ref 0–1.9)
BILIRUB SERPL-MCNC: 0.4 MG/DL (ref 0.1–1)
BNP SERPL-MCNC: 369 PG/ML (ref 0–99)
BUN SERPL-MCNC: 18 MG/DL (ref 8–23)
CALCIUM SERPL-MCNC: 10 MG/DL (ref 8.7–10.5)
CHLORIDE SERPL-SCNC: 108 MMOL/L (ref 95–110)
CO2 SERPL-SCNC: 26 MMOL/L (ref 23–29)
CREAT SERPL-MCNC: 1.4 MG/DL (ref 0.5–1.4)
DIFFERENTIAL METHOD BLD: ABNORMAL
EOSINOPHIL # BLD AUTO: 0.3 K/UL (ref 0–0.5)
EOSINOPHIL NFR BLD: 4.7 % (ref 0–8)
ERYTHROCYTE [DISTWIDTH] IN BLOOD BY AUTOMATED COUNT: 18 % (ref 11.5–14.5)
EST. GFR  (NO RACE VARIABLE): 37.8 ML/MIN/1.73 M^2
GLUCOSE SERPL-MCNC: 107 MG/DL (ref 70–110)
HCT VFR BLD AUTO: 38 % (ref 37–48.5)
HGB BLD-MCNC: 11.7 G/DL (ref 12–16)
IMM GRANULOCYTES # BLD AUTO: 0.02 K/UL (ref 0–0.04)
IMM GRANULOCYTES NFR BLD AUTO: 0.3 % (ref 0–0.5)
LYMPHOCYTES # BLD AUTO: 1.6 K/UL (ref 1–4.8)
LYMPHOCYTES NFR BLD: 26.5 % (ref 18–48)
MAGNESIUM SERPL-MCNC: 2.2 MG/DL (ref 1.6–2.6)
MCH RBC QN AUTO: 27.5 PG (ref 27–31)
MCHC RBC AUTO-ENTMCNC: 30.8 G/DL (ref 32–36)
MCV RBC AUTO: 89 FL (ref 82–98)
MONOCYTES # BLD AUTO: 0.7 K/UL (ref 0.3–1)
MONOCYTES NFR BLD: 11.7 % (ref 4–15)
NEUTROPHILS # BLD AUTO: 3.3 K/UL (ref 1.8–7.7)
NEUTROPHILS NFR BLD: 55.6 % (ref 38–73)
NRBC BLD-RTO: 0 /100 WBC
PHOSPHATE SERPL-MCNC: 3.4 MG/DL (ref 2.7–4.5)
PLATELET # BLD AUTO: 172 K/UL (ref 150–450)
PMV BLD AUTO: 9.9 FL (ref 9.2–12.9)
POTASSIUM SERPL-SCNC: 4.6 MMOL/L (ref 3.5–5.1)
PROT SERPL-MCNC: 6 G/DL (ref 6–8.4)
RBC # BLD AUTO: 4.26 M/UL (ref 4–5.4)
SODIUM SERPL-SCNC: 141 MMOL/L (ref 136–145)
WBC # BLD AUTO: 6 K/UL (ref 3.9–12.7)

## 2025-02-18 PROCEDURE — 83880 ASSAY OF NATRIURETIC PEPTIDE: CPT

## 2025-02-18 PROCEDURE — 83735 ASSAY OF MAGNESIUM: CPT

## 2025-02-18 PROCEDURE — 85025 COMPLETE CBC W/AUTO DIFF WBC: CPT

## 2025-02-18 PROCEDURE — 84100 ASSAY OF PHOSPHORUS: CPT

## 2025-02-18 PROCEDURE — 80053 COMPREHEN METABOLIC PANEL: CPT

## 2025-02-18 NOTE — PROGRESS NOTES
"[facility]) First Week Visit     Pt presents to clinic 2/18/25 and accompanied by daughters.     Most Recent Hospital Discharge Date: 2/3/25  Last admission Diagnosis/chief complaint:sob        Visit Vitals:     Wt Readings from Last 3 Encounters:   02/18/25 64 kg (141 lb 1.5 oz)   02/17/25 62.6 kg (138 lb)   02/13/25 63.3 kg (139 lb 10.6 oz)     Temp Readings from Last 3 Encounters:   02/03/25 97.9 °F (36.6 °C) (Oral)   09/03/24 97.7 °F (36.5 °C) (Oral)   08/11/24 97.3 °F (36.3 °C) (Oral)     BP Readings from Last 3 Encounters:   02/18/25 (!) 119/58   02/17/25 113/65   02/13/25 (!) 108/56     Pulse Readings from Last 3 Encounters:   02/18/25 69   02/13/25 67   02/10/25 68            Pt reports the following:  []  Shortness of Breath with activity on o2  []  Shortness of Breath at rest/ sleeping on 2-3 pillows "some days"  []  Fatigue  [x]  Edema in feet at baseline  [] Chest pain or tightness  [] Weight Increase since discharge  [] None of the above    Pt reports being in the green (color) Zone. If in yellow/red, reminded that [facility] or now.      Medications:     Pt reports having all medications available and understands how to take them appropriately. Reminded pt to call prior to making any changes to medications.      Education:    [x] Gave pt new  / Confirmed pt has  "Heart Failure Transitional Care Clinic Home Care Guide" .   Reviewed key points as listed below.      Recommend 2 gram sodium restriction and 1500cc fluid restriction.  Encourage physical activity with graded exercise program.  Requested patient to weigh themselves daily, and to notify us if their weight increases by more than 3 lbs in 1 day or 5 lbs in 3 days.      [x] Gave / Reviewed "Daily Weight and Symptom Tracker".  Reviewed with patient when and how to [facility] according to "Yellow Zone" and "Red Zone".                  [x] Pt given list of low/high sodium food list. Patient " "has been very strict with low sodium diet.                    Watch for these Signs and Symptoms: If any of these occur, contact HFT[facility] immediately:   Increase in shortness of breath with movement   Increase in swelling in your legs and ankles   Weight gain of more than 3 pounds in a night or 5 pounds in 3 days.   Difficulty breathing when you are lying down   Worsening fatigue or tiredness   Stomach bloating, a full feeling or a loss of appetite   Increased coughing--especially when you are lying down     MyChart and Care Companion:              Patient active on myChart? Yes, patient uses regularly.    Contacting our Team:              Reviewed with pt how to contact HFT[facility] RN via phone or [employer] messaging.      HF TCC Program Plan:  Pt educated on follow-up plan while in HFT[facility] program.   [x]  PT given /reviewed upcoming appointment/check in dates. These will include weekly contact with RN or visits with providers over the next 4-6 weeks.                   [x]  Pt educated that they will transitioned to long term care provider team at week 4-6.  This team will be either Cardiology, PCP or Advanced Heart Failure depending on needs.        Emhasized use of the HFT[facility] and after hours numbers.  Pt was able to verbalize back to RN in their own words correct diet/fluid restrictions, necessity for exercise, warning signs and symptoms, when and how to contact their TCC team .       Plan:      Per NEHEMIAH     [x]  Pt given AVS with follow up appointment within 2 weeks and medication detail list for ease of use.     [x]  Explained to pt they will have a phone "check in" by RN in/on 2/25/25        "

## 2025-02-18 NOTE — PATIENT INSTRUCTIONS
Will call today with lab results and any possible bumex adjustments.    Notify us ([phone]) with worsening shortness of breath, swelling or 3lb weight gain in 1 day/5lb weight gain in 1 week.

## 2025-02-18 NOTE — TELEPHONE ENCOUNTER
[facility]    spoke with Ms. Marina Gruber to inform her of the following about Ms. Misa Barajas.     Can we call daughter (emelyn) and let her know labs look good. BNP significantly improved (2500->350), kidney function near baseline (Cr today 1.4, looks like usually runs between 1.1-1.2). Electrolytes wnl. I'd recommend resuming bumex 1mg daily with afternoon doses only prn. Will use her current weight as her dry weight goal (138lbs).    Her labs are looking good. Her fluid markers have significantly improved from 2500 to 350, her kidney function is doing better. Her creatinine is at 1.4 today. She usually runs between 1.1-1.2. Her electrolytes are with in normal limits. Ms. Bell recommend continuing her Bumex 1mg daily. And another in the afternoon only as needed. Her as needed instructions are shortness of breath, any swelling in your body, weight gain of 3 or more pounds over night or 5 or more pounds in three days to a week. Her current dry weight goal is 138lbs. If her weight goes 3lbs or more over the goal give us a call. Ms. Bell would also like to see Ms. Barajas in two weeks which will put her at 3/3/2025 for 10:00a with labs for 9:30a. Ms. Gruber states she will be out of town that week. She asked if we can see her 3/13/2025 for the same time. The pt is scheduled to return to clinic 3/13/2025 for 10:00a with labs for 9:30a. A message was sent to our MA for rescheduling.

## 2025-02-22 PROBLEM — N18.32 STAGE 3B CHRONIC KIDNEY DISEASE: Status: ACTIVE | Noted: 2025-02-22

## 2025-02-22 PROBLEM — I48.0 PAF (PAROXYSMAL ATRIAL FIBRILLATION): Status: ACTIVE | Noted: 2025-02-22

## 2025-02-22 PROBLEM — E66.811 CLASS 1 OBESITY DUE TO EXCESS CALORIES WITH SERIOUS COMORBIDITY AND BODY MASS INDEX (BMI) OF 30.0 TO 30.9 IN ADULT: Status: RESOLVED | Noted: 2024-05-16 | Resolved: 2025-02-22

## 2025-02-22 PROBLEM — E66.09 CLASS 1 OBESITY DUE TO EXCESS CALORIES WITH SERIOUS COMORBIDITY AND BODY MASS INDEX (BMI) OF 30.0 TO 30.9 IN ADULT: Status: RESOLVED | Noted: 2024-05-16 | Resolved: 2025-02-22

## 2025-02-25 ENCOUNTER — DOCUMENTATION ONLY (OUTPATIENT)
Dept: CARDIOLOGY | Facility: CLINIC | Age: 80
End: 2025-02-25
Payer: MEDICARE

## 2025-02-25 NOTE — PROGRESS NOTES
"Heart Failure [facility]itional [facility] Clinic([facility]) weekly phone follow up / triage call completed.     TCC RN Navigator spoke with patient    Current Patient reported weight: 140.4   Patient Goal Weight: (unsure)  Recent Patient reported blood pressure and heart rate: 126/83    Pt reports the following:  []  Shortness of Breath with Activity  []  Shortness of Breath at rest   []  Fatigue  [x]  Edema baseline edema stable  [] Chest pain or tightness  [] Weight Increase since discharge  [] None of the above    Pt reports using "Daily weight and symptom tracker".    Pt reports being in the green (color) Zone. If in yellow/red, reminded that they should be calling H[facility] today or now.     Medications:   Medication compliance reviewed with pt.  Pt reports having medication list available and has all medications at home for use per list.     Education:   Confirmed pt still has "Heart Failure Transitional Care Clinic Home Care Guide"  .     Reminded of key points as listed below.     Recommend 2 -3 gram sodium restriction and 1500 cc-2000 cc fluid restriction.  Encourage physical activity with graded exercise program.  Requested patient to weigh themselves daily, and to notify us if their weight increases by more than 3 lbs in 1 day or 5 lbs in 3 days.   Reminded to use "Daily weight and symptom tracker".  Even if pt does not have a scale, to use symptom tracker.       Watch for these Signs and Symptoms: If any of these occur[facility]:   Increase in shortness of breath with movement   Increase in swelling in your legs and ankles   Weight gain of more than 3 pounds in a day or 5 pounds in 3 days.   Difficulty breathing when you are lying down   Worsening fatigue or tiredness   Stomach bloating, a full feeling or a loss of appetite   Increased coughing--especially when you are lying down      Pt was able to verbalize back to RN in their own words correct diet/fluid restrictions, necessity for exercise, warning " signs and symptoms, when and how to contact their HFT[facility] team.      Pt reminded of upcoming appointment.  PT reports they will attend. 3/13/25      Pt reminded of how and when to contact [facility]:  [phone](Mon-Fri, 8a-5p) & for urgent issues on the weekend to page the Heart Transplant MD on call.       Pt verbalized understanding and in agreement of plan.       Will follow up with pt at next clinic visit and RN navigator available for pt questions, issues or concerns.

## 2025-03-05 ENCOUNTER — DOCUMENTATION ONLY (OUTPATIENT)
Dept: CARDIOLOGY | Facility: CLINIC | Age: 80
End: 2025-03-05
Payer: MEDICARE

## 2025-03-05 NOTE — PROGRESS NOTES
"Heart Failure Transitional Care Clinic([facility]) weekly phone follow up / triage call completed.     TCC RN Navigator spoke with patient    Current Patient reported weight: 141.2   Patient Goal Weight: (unsure)  Recent Patient reported blood pressure and heart rate: 125/63    Pt reports the following:  []  Shortness of Breath with Activity  []  Shortness of Breath at rest   []  Fatigue  [x]  Edema (baseline slight edema)  [] Chest pain or tightness  [] Weight Increase since discharge  [] None of the above    Pt reports using "Daily weight and symptom tracker".    Pt reports being in the green (color) Zone. If in yellow/red, reminded that they should be calling H[facility] today or now.     Medications:   Medication compliance reviewed with pt.  Pt reports having medication list available and has all medications at home for use per list.     Education:   Confirmed pt still has "Heart Failure Transitional Care Clinic Home Care Guide"  .     Reminded of key points as listed below.     Recommend 2 -3 gram sodium restriction and 1500 cc-2000 cc fluid restriction.  Encourage physical activity with graded exercise program.  Requested patient to weigh themselves daily, and to notify us if their weight increases by more than 3 lbs in 1 day or 5 lbs in 3 days.   Reminded to use "Daily weight and symptom tracker".  Even if pt does not have a scale, to use symptom tracker.       Watch for these Signs and Symptoms: If any of these occur, contact [facility] immediately:   Increase in shortness of breath with movement   Increase in swelling in your legs and ankles   Weight gain of more than 3 pounds in a day or 5 pounds in 3 days.   Difficulty breathing when you are lying down   Worsening fatigue or tiredness   Stomach bloating, a full feeling or a loss of appetite   Increased coughing--especially when you are lying down      Pt was able to verbalize back to RN in their own words correct diet/fluid restrictions, necessity for exercise, " warning signs and symptoms, when and how to contact their HFT[facility] team.      Pt reminded of upcoming appointment.  PT reports they will attend. 3/13/25      Pt reminded of how and when to contact [facility]:  [phone](Mon-Fri, 8a-5p) & for urgent issues on the weekend to page the Heart Transplant MD on call.  Pt also encouraged utilize [facility] messaging as well.      Pt verbalized understanding and in agreement of plan.       Will follow up with pt at next clinic visit and RN navigator available for pt questions, issues or concerns.

## 2025-03-18 ENCOUNTER — OFFICE VISIT (OUTPATIENT)
Dept: PRIMARY CARE CLINIC | Facility: CLINIC | Age: 80
End: 2025-03-18
Payer: MEDICARE

## 2025-03-18 ENCOUNTER — PATIENT MESSAGE (OUTPATIENT)
Dept: PRIMARY CARE CLINIC | Facility: CLINIC | Age: 80
End: 2025-03-18

## 2025-03-18 ENCOUNTER — DOCUMENTATION ONLY (OUTPATIENT)
Dept: CARDIOLOGY | Facility: CLINIC | Age: 80
End: 2025-03-18
Payer: MEDICARE

## 2025-03-18 DIAGNOSIS — I95.9 HYPOTENSION, UNSPECIFIED HYPOTENSION TYPE: ICD-10-CM

## 2025-03-18 DIAGNOSIS — N18.32 STAGE 3B CHRONIC KIDNEY DISEASE: ICD-10-CM

## 2025-03-18 DIAGNOSIS — I50.32 CHRONIC DIASTOLIC HEART FAILURE: Primary | ICD-10-CM

## 2025-03-18 DIAGNOSIS — I27.20 PULMONARY HYPERTENSION: ICD-10-CM

## 2025-03-18 PROBLEM — Z76.89 ENCOUNTER TO ESTABLISH CARE WITH NEW DOCTOR: Status: RESOLVED | Noted: 2024-12-17 | Resolved: 2025-03-18

## 2025-03-18 PROCEDURE — 1160F RVW MEDS BY RX/DR IN RCRD: CPT | Mod: CPTII,95,, | Performed by: STUDENT IN AN ORGANIZED HEALTH CARE EDUCATION/TRAINING PROGRAM

## 2025-03-18 PROCEDURE — 1123F ACP DISCUSS/DSCN MKR DOCD: CPT | Mod: CPTII,95,, | Performed by: STUDENT IN AN ORGANIZED HEALTH CARE EDUCATION/TRAINING PROGRAM

## 2025-03-18 PROCEDURE — 1159F MED LIST DOCD IN RCRD: CPT | Mod: CPTII,95,, | Performed by: STUDENT IN AN ORGANIZED HEALTH CARE EDUCATION/TRAINING PROGRAM

## 2025-03-18 PROCEDURE — 98005 SYNCH AUDIO-VIDEO EST LOW 20: CPT | Mod: 95,,, | Performed by: STUDENT IN AN ORGANIZED HEALTH CARE EDUCATION/TRAINING PROGRAM

## 2025-03-18 RX ORDER — INHALER, ASSIST DEVICES
SPACER (EA) MISCELLANEOUS
Qty: 1 EACH | Refills: 0 | Status: SHIPPED | OUTPATIENT
Start: 2025-03-18

## 2025-03-18 NOTE — PROGRESS NOTES
Clinic Note  3/18/2025      Subjective:       Patient ID:  Misa is a 79 y.o. female being seen for an established visit.    Chief Complaint: No chief complaint on file.    HPI  Misa Barajas is a 79 y.o.  female who presents with copd with chronic hypoxic respiratory failure (2L via NC, 24/7), diastolic CHF, CKD stage 3a, aflutter (ablation 6/2024, successful, dr. werner), osteoporosis, hx of L breast cancer (2000, s/p left mastectomy and lymphadenectomy, chemo), hx of HTN (now low), drug induced neuropathy, gallstone pancreatitis(still has the gallbladder), nephroloithiasis, partial R nephrectomy (benign tumor) is here to for a f/u visit.  -just her sinuses bothering her today. No other issues.   -she went to ER jan 29th for sob and wheezing. Was treated for copd exacerbation and HF. She did call her pulm prior to this who prescribed steroids and doxy but that did not help so she had to go to the ER. Her BNP was elevated. Seh was diuresed. She is weighing herself daily. Pt denies weight gain. Denies orthopnea or lower ext edema. She is taking bumex once a day and using     Advance Care Planning     Date: 03/18/2025    Power of   I initiated the process of voluntary advance care planning today and explained the importance of this process to the patient.  I introduced the concept of advance directives to the patient, as well. Then the patient received detailed information about the importance of designating a Health Care Power of  (HCPOA). She was also instructed to communicate with this person about their wishes for future healthcare, should she become sick and lose decision-making capacity. The patient has previously appointed a HCPOA. After our discussion, the patient has decided to complete a HCPOA and has appointed her daughters, health care agent: Marina Gruber and Esther Barajas & health care agent number: [phone]. I encouraged her to communicate with this person about their  wishes for future healthcare, should she become sick and lose decision-making capacity.      A total of 5 min was spent on advance care planning, goals of care discussion, emotional support, formulating and communicating prognosis and exploring burden/benefit of various approaches of treatment. This discussion occurred on a fully voluntary basis with the verbal consent of the patient and/or family.      Review of Systems   Constitutional:  Negative for chills and fever.   HENT:  Negative for hearing loss.    Respiratory:  Positive for cough. Negative for shortness of breath and wheezing.    Cardiovascular:  Negative for chest pain, orthopnea and leg swelling.   Gastrointestinal:  Negative for abdominal pain, blood in stool, constipation and diarrhea.   Genitourinary:  Negative for dysuria and hematuria.   Neurological:  Negative for dizziness and headaches.       Medication List with Changes/Refills   Current Medications    ACETAMINOPHEN (TYLENOL) 500 MG TABLET        ALBUTEROL SULFATE (PROAIR RESPICLICK) 90 MCG/ACTUATION INHALER    2 puffs as needed Inhalation every 6 hrs 30 days    ASPIRIN (ECOTRIN) 81 MG EC TABLET    1 tablet Orally Once a day 30 days    BD POSIFLUSH NORMAL SALINE 0.9 INJECTION        BIOTIN 1 MG CAP    1 capsule Orally Once a day 30 days    BUDESONIDE-GLYCOPYR-FORMOTEROL (BREZTRI AEROSPHERE) 160-9-4.8 MCG/ACTUATION HFAA    Inhale 2 puffs into the lungs 2 (two) times daily.    BUMETANIDE (BUMEX) 1 MG TABLET    Take 1 tablet (1 mg total) by mouth 2 (two) times a day.    BUSPIRONE (BUSPAR) 5 MG TAB    1 tablet Orally once daily as needed 30 days    FERROUS SULFATE (FEOSOL) 325 MG (65 MG IRON) TAB TABLET    Take 1 tablet (325 mg total) by mouth every other day.    FLUTICASONE PROPIONATE (FLONASE) 50 MCG/ACTUATION NASAL SPRAY    1 spray by Each Nostril route daily as needed for Rhinitis or Allergies.    INHALATION SPACING DEVICE (OPTICHAMBER AZUCENA [facility])    Use as directed for inhalation.     "LEVALBUTEROL (XOPENEX) 1.25 MG/3 ML NEBULIZER SOLUTION    Take 1 ampule by nebulization every 8 (eight) hours as needed for Wheezing or Shortness of Breath.    MAGNESIUM HYDROXIDE 400 MG/5 ML (MILK OF MAGNESIA) 400 MG/5 ML SUSP    30 ml as neded Orally once daily    MECLIZINE (ANTIVERT) 12.5 MG TABLET    Take 1 tablet (12.5 mg total) by mouth Daily.    METOPROLOL SUCCINATE (TOPROL-XL) 25 MG 24 HR TABLET    Take ½ tablet (12.5 mg total) by mouth once daily.    MIDODRINE (PROAMATINE) 2.5 MG TAB    Take 1 tablet (2.5 mg total) by mouth 3 (three) times daily.    ONDANSETRON (ZOFRAN) 4 MG TABLET    1 tablet as needed for nausea Orally every 8 hours 7 days    POLYETHYLENE GLYCOL (MIRALAX) 17 GRAM/DOSE POWDER        POTASSIUM CHLORIDE (MICRO-K) 10 MEQ CPSR    Take 2 capsules (20 mEq total) by mouth once daily.    SENNA-DOCUSATE 8.6-50 MG (PERICOLACE) 8.6-50 MG PER TABLET    Take by mouth once daily.    TRAZODONE (DESYREL) 50 MG TABLET    Take 0.5 tablets (25 mg total) by mouth nightly as needed for Insomnia (Anxiety or insomnia).    TRIAMCINOLONE ACETONIDE 0.1% (KENALOG) 0.1 % CREAM    Apply externally to affected area twice a day until clear    VITAMIN D (VITAMIN D3) 1000 UNITS TAB    Take 2 tablets (2,000 Units total) by mouth once daily.           Objective:      There were no vitals taken for this visit.  Estimated body mass index is 24.22 kg/m² as calculated from the following:    Height as of 2/18/25: 5' 4" (1.626 m).    Weight as of 2/18/25: 64 kg (141 lb 1.5 oz).  Physical Exam  Constitutional:       Appearance: Normal appearance.   Eyes:      Conjunctiva/sclera: Conjunctivae normal.   Pulmonary:      Effort: Pulmonary effort is normal. No respiratory distress.   Neurological:      Mental Status: She is alert and oriented to person, place, and time.           Assessment and Plan:     1. Chronic diastolic heart failure  Assessment & Plan:  Her dry weight should be 138. She weighs 140 and has been stable like that. " She weighs herself daily. She denies orthopnea or lower ext edema. Her BP is at goal. Continue bumex 1 mg daily      2. Hypotension, unspecified hypotension type  Assessment & Plan:  Not on florinef anymore. Not requiring midodrine. BP is staying stable in the 120s and 110s. Will monitor       3. Pulmonary hypertension  Assessment & Plan:  PA pressure of 51 from echo on 1/2025. Pt has diastolic HF and copd. Pt is on chronic oxygen at 2 L. Will monitor.       4. Stage 3b chronic kidney disease  Assessment & Plan:  Renal function has been stable. Avoid nephrotoxins. Does not tolerate antihypertensives so she is not on ACEI or ARB. Will monitor              Follow Up:   Follow up in about 2 months (around 5/18/2025), or in person. prefers morning but not too early. can be any day of the week..    Other Orders Placed This Visit:  No orders of the defined types were placed in this encounter.        Isela Langston    The patient location is: home  The chief complaint leading to consultation is: CHF, COPD    Visit type: audiovisual    Face to Face time with patient: 15  20 minutes of total time spent on the encounter, which includes face to face time and non-face to face time preparing to see the patient (eg, review of tests), Obtaining and/or reviewing separately obtained history, Documenting clinical information in the electronic or other health record, Independently interpreting results (not separately reported) and communicating results to the patient/family/caregiver, or Care coordination (not separately reported).         Each patient to whom he or she provides medical services by telemedicine is:  (1) informed of the relationship between the physician and patient and the respective role of any other health care provider with respect to management of the patient; and (2) notified that he or she may decline to receive medical services by telemedicine and may withdraw from such care at any time.

## 2025-03-18 NOTE — ASSESSMENT & PLAN NOTE
PA pressure of 51 from echo on 1/2025. Pt has diastolic HF and copd. Pt is on chronic oxygen at 2 L. Will monitor.

## 2025-03-18 NOTE — ASSESSMENT & PLAN NOTE
Her dry weight should be 138. She weighs 140 and has been stable like that. She weighs herself daily. She denies orthopnea or lower ext edema. Her BP is at goal. Continue bumex 1 mg daily

## 2025-03-18 NOTE — PROGRESS NOTES
"Heart Failure [facility]) weekly phone follow up / triage call completed.     TCC RN Navigator spoke with patient    Current Patient reported weight: 140.4   Patient Goal Weight: (140)  Recent Patient reported blood pressure and heart rate: 118/57  71    Pt reports the following:  []  Shortness of Breath with Activity  []  Shortness of Breath at rest   []  Fatigue  [x]  Edema minimal in ankles  [] Chest pain or tightness  [] Weight Increase since discharge  [] None of the above    Pt reports using "Daily weight and symptom tracker".    Pt reports being in the green (color) Zone. If in yellow/red, reminded that they should be calling HFT[facility] today or now.     Medications:   Medication compliance reviewed with pt.  Pt reports having medication list available and has all medications at home for use per list.     Education:   Confirmed pt still has "Heart Failure Transitional Care Clinic Home Care Guide"  .     Reminded of key points as listed below.     Recommend 2 -3 gram sodium restriction and 1500 cc-2000 cc fluid restriction.  Encourage physical activity with graded exercise program.  Requested patient to weigh themselves daily, and to notify us if their weight increases by more than 3 lbs in 1 day or 5 lbs in 3 days.   Reminded to use "Daily weight and symptom tracker".  Even if pt does not have a scale, to use symptom tracker.       Watch for these Signs and Symptoms: If any of these occur, [facility]:   Increase in shortness of breath with movement   Increase in swelling in your legs and ankles   Weight gain of more than 3 pounds in a day or 5 pounds in 3 days.   Difficulty breathing when you are lying down   Worsening fatigue or tiredness   Stomach bloating, a full feeling or a loss of appetite   Increased coughing--especially when you are lying down      Pt was able to verbalize back to RN in their own words correct diet/fluid restrictions, necessity for exercise, warning " signs and symptoms, when and how to contact their HFT[facility] team.      Pt reminded of upcoming appointment.  PT reports they will attend. 3/27/25      Pt reminded of how and when to contact [facility]:  [phone](Mon-Fri, 8a-5p) & for urgent issues on the weekend to page the Heart Transplant MD on call.  Pt also encouraged utilize [facility] messaging as well.      Pt verbalized understanding and in agreement of plan.       Will follow up with pt at next clinic visit and RN navigator available for pt questions, issues or concerns.

## 2025-03-18 NOTE — ASSESSMENT & PLAN NOTE
Not on florinef anymore. Not requiring midodrine. BP is staying stable in the 120s and 110s. Will monitor

## 2025-03-18 NOTE — ASSESSMENT & PLAN NOTE
Renal function has been stable. Avoid nephrotoxins. Does not tolerate antihypertensives so she is not on ACEI or ARB. Will monitor

## 2025-03-25 RX ORDER — PREDNISONE 10 MG/1
10 TABLET ORAL 2 TIMES DAILY
COMMUNITY
Start: 2024-10-08

## 2025-03-25 RX ORDER — AMOXICILLIN AND CLAVULANATE POTASSIUM 875; 125 MG/1; MG/1
1 TABLET, FILM COATED ORAL 2 TIMES DAILY
COMMUNITY
Start: 2024-10-28

## 2025-03-25 RX ORDER — AMOXICILLIN AND CLAVULANATE POTASSIUM 500; 125 MG/1; MG/1
1 TABLET, FILM COATED ORAL
COMMUNITY
Start: 2024-11-12

## 2025-03-25 RX ORDER — ALBUTEROL SULFATE 90 UG/1
1 INHALANT RESPIRATORY (INHALATION)
COMMUNITY
Start: 2024-11-12

## 2025-03-25 RX ORDER — POTASSIUM CHLORIDE 750 MG/1
10 TABLET, EXTENDED RELEASE ORAL
COMMUNITY
Start: 2024-11-04

## 2025-03-25 RX ORDER — BUTYROSPERMUM PARKII(SHEA BUTTER), SIMMONDSIA CHINENSIS (JOJOBA) SEED OIL, ALOE BARBADENSIS LEAF EXTRACT .01; 1; 3.5 G/100G; G/100G; G/100G
1 LIQUID TOPICAL
COMMUNITY
Start: 2024-11-12

## 2025-03-25 RX ORDER — CIPROFLOXACIN 500 MG/1
500 TABLET ORAL
COMMUNITY
Start: 2024-11-07

## 2025-03-25 RX ORDER — POTASSIUM CHLORIDE 20 MEQ/1
20 TABLET, EXTENDED RELEASE ORAL
COMMUNITY
Start: 2024-10-04

## 2025-03-26 NOTE — PROGRESS NOTES
[facility] T[facility] Provider Note (Follow-up) Consult Note      HPI:     Patient reports SOB at baseline, wearing 2L supplemental O2. Presents to clinic in wheelchair              Patient sleeps in recliner at baseline for the past year or so              Patient wakes up SOB, has to get out of bed, associated cough- denies PND symptoms, endorses intermittent dry cough 2/2 post nasal drainage              Palpitations - denies              Dizzy, light-headed, pre-syncope or syncope- denies recent episodes of dizziness/lightheadedness. Has not required prn midodrine doses              Since discharge frequency of performing weights, home weight and weight change- performing daily weights. Weight uptrending over the past month. Previously dry weight ~138lbs. The past few weeks, weight has been closer to ~140. Was 142lbs yesterday and took prn bumex dose. Reports currently taking bumex 1mg daily rather than BID. Does endorse chronic BLE edema, wearing compression stockings.              Other information felt pertinent to HPI: Ms. Misa Barajas is a 78 yo female with COPD on 2L home O2, HFpEF, AFL s/p RFA, CKD III, HTN, HLD who presents to second HFTCC visit following recent admission for AHRF 2/2 ADHF and COPD exacerbation. On ED presentation BNP elevated to 2500, troponin elevated to 19, CXR with pulmonary edema. She was started on steroids, nebs and antibiotics in addition to IV diuresis. Updated TTE stable from prior, showing HFpEF with elevated CVP 15. Her O2 requirements improved to baseline with diuresis and she was subsequently discharged.     PHYSICAL:   Vitals:    03/27/25 1025   BP: (!) 140/68   Pulse: 67      @[id number](3)@    JVD: no   Heart rhythm: regular  Cardiac murmur: No    S3: no  S4: no  Lungs: diminished breath sounds with minimal crackles in posterior lung bases  Hepatojugular reflux: no  Edema: yes, 1+ BLE edema most prominent in ankles. Wearing compression stockings bilaterally    Lab Results    Component Value Date     02/18/2025    K 4.6 02/18/2025    MG 2.2 02/18/2025     02/18/2025    CO2 26 02/18/2025    BUN 18 02/18/2025    CREATININE 1.4 02/18/2025     02/18/2025    CALCIUM 10.0 02/18/2025    AST 32 02/18/2025    ALT 19 02/18/2025    ALBUMIN 2.8 (L) 02/18/2025    PROT 6.0 02/18/2025    BILITOT 0.4 02/18/2025     Lab Results   Component Value Date     (H) 02/18/2025    BNP 2,548 (H) 01/29/2025     (H) 07/26/2024       ASSESSMENT/PLAN:     1. Chronic diastolic heart failure  -NYHA Class III symptoms. Gradual uptrend in weight over the past couple months. Currently taking bumex 1mg daily rather than BID. Endorses adequate UOP.  -Recommend taking bumex 1mg BID for the next 2 days for mild fluid volume on exam, then resuming bumex 1mg daily with afternoon doses prn  -Reports jardiance previously discontinued 2/2 recurrent UTIs  -Recommend 2-3 gram sodium restriction and 1500cc- 2000cc fluid restriction.  -Requested patient to weigh themselves daily, and to notify us if their weight increases by more than 3 lbs in 1 day or 5 lbs in 3 days.  -Pt has completed HFTCC program with no hospital readmissions for 31 days. Established with [facility] with follow up scheduled 5/20    2. Typical atrial flutter  -s/p ablation 6/2024 without recurrence; OAC discontinued per EP  -Continue toprol 12.5mg daily    3. Orthostatic hypotension  -Home recorded BP at goal, has not required prn midodrine doses  -Continue daily blood pressure logs      4. Venous insufficiency of both lower extremities  -Continue periodic leg elevation and wearing compression stockings when keeping feet dependent     5. Stage 3 chronic kidney disease, unspecified whether stage 3a or 3b CKD              -Cr 1.4, at baseline. Established with Nephrology     Arleth Bell PA-C

## 2025-03-27 ENCOUNTER — LAB VISIT (OUTPATIENT)
Dept: LAB | Facility: HOSPITAL | Age: 80
End: 2025-03-27
Payer: MEDICARE

## 2025-03-27 ENCOUNTER — OFFICE VISIT (OUTPATIENT)
Dept: CARDIOLOGY | Facility: CLINIC | Age: 80
End: 2025-03-27
Payer: MEDICARE

## 2025-03-27 ENCOUNTER — DOCUMENTATION ONLY (OUTPATIENT)
Dept: CARDIOLOGY | Facility: CLINIC | Age: 80
End: 2025-03-27

## 2025-03-27 VITALS
SYSTOLIC BLOOD PRESSURE: 140 MMHG | WEIGHT: 147.25 LBS | DIASTOLIC BLOOD PRESSURE: 68 MMHG | HEART RATE: 67 BPM | BODY MASS INDEX: 25.14 KG/M2 | HEIGHT: 64 IN

## 2025-03-27 DIAGNOSIS — I48.3 TYPICAL ATRIAL FLUTTER: ICD-10-CM

## 2025-03-27 DIAGNOSIS — I50.32 CHRONIC DIASTOLIC HEART FAILURE: Primary | ICD-10-CM

## 2025-03-27 DIAGNOSIS — I95.1 ORTHOSTATIC HYPOTENSION: ICD-10-CM

## 2025-03-27 DIAGNOSIS — N18.30 STAGE 3 CHRONIC KIDNEY DISEASE, UNSPECIFIED WHETHER STAGE 3A OR 3B CKD: ICD-10-CM

## 2025-03-27 DIAGNOSIS — R06.02 SOB (SHORTNESS OF BREATH): ICD-10-CM

## 2025-03-27 DIAGNOSIS — I87.2 VENOUS INSUFFICIENCY OF BOTH LOWER EXTREMITIES: ICD-10-CM

## 2025-03-27 DIAGNOSIS — I50.32 CHRONIC HEART FAILURE WITH PRESERVED EJECTION FRACTION: ICD-10-CM

## 2025-03-27 LAB
ABSOLUTE EOSINOPHIL (OHS): 0.3 K/UL
ABSOLUTE MONOCYTE (OHS): 1.09 K/UL (ref 0.3–1)
ABSOLUTE NEUTROPHIL COUNT (OHS): 5.52 K/UL (ref 1.8–7.7)
ALBUMIN SERPL BCP-MCNC: 3 G/DL (ref 3.5–5.2)
ALP SERPL-CCNC: 120 UNIT/L (ref 40–150)
ALT SERPL W/O P-5'-P-CCNC: 18 UNIT/L (ref 10–44)
ANION GAP (OHS): 10 MMOL/L (ref 8–16)
AST SERPL-CCNC: 27 UNIT/L (ref 11–45)
BASOPHILS # BLD AUTO: 0.08 K/UL
BASOPHILS NFR BLD AUTO: 0.9 %
BILIRUB SERPL-MCNC: 0.6 MG/DL (ref 0.1–1)
BNP SERPL-MCNC: 431 PG/ML (ref 0–99)
BUN SERPL-MCNC: 21 MG/DL (ref 8–23)
CALCIUM SERPL-MCNC: 10.2 MG/DL (ref 8.7–10.5)
CHLORIDE SERPL-SCNC: 108 MMOL/L (ref 95–110)
CO2 SERPL-SCNC: 23 MMOL/L (ref 23–29)
CREAT SERPL-MCNC: 1.4 MG/DL (ref 0.5–1.4)
ERYTHROCYTE [DISTWIDTH] IN BLOOD BY AUTOMATED COUNT: 15.3 % (ref 11.5–14.5)
GFR SERPLBLD CREATININE-BSD FMLA CKD-EPI: 38 ML/MIN/1.73/M2
GLUCOSE SERPL-MCNC: 76 MG/DL (ref 70–110)
HCT VFR BLD AUTO: 40.3 % (ref 37–48.5)
HGB BLD-MCNC: 12.5 GM/DL (ref 12–16)
IMM GRANULOCYTES # BLD AUTO: 0.03 K/UL (ref 0–0.04)
IMM GRANULOCYTES NFR BLD AUTO: 0.4 % (ref 0–0.5)
LYMPHOCYTES # BLD AUTO: 1.5 K/UL (ref 1–4.8)
MAGNESIUM SERPL-MCNC: 2.2 MG/DL (ref 1.6–2.6)
MCH RBC QN AUTO: 27.7 PG (ref 27–50)
MCHC RBC AUTO-ENTMCNC: 31 G/DL (ref 32–36)
MCV RBC AUTO: 89 FL (ref 82–98)
NUCLEATED RBC (/100WBC) (OHS): 0 /100 WBC
PHOSPHATE SERPL-MCNC: 3.3 MG/DL (ref 2.7–4.5)
PLATELET # BLD AUTO: 229 K/UL (ref 150–450)
PMV BLD AUTO: 9.5 FL (ref 9.2–12.9)
POTASSIUM SERPL-SCNC: 4.2 MMOL/L (ref 3.5–5.1)
PROT SERPL-MCNC: 6.4 GM/DL (ref 6–8.4)
RBC # BLD AUTO: 4.52 M/UL (ref 4–5.4)
RELATIVE EOSINOPHIL (OHS): 3.5 %
RELATIVE LYMPHOCYTE (OHS): 17.6 % (ref 18–48)
RELATIVE MONOCYTE (OHS): 12.8 % (ref 4–15)
RELATIVE NEUTROPHIL (OHS): 64.8 % (ref 38–73)
SODIUM SERPL-SCNC: 141 MMOL/L (ref 136–145)
WBC # BLD AUTO: 8.52 K/UL (ref 3.9–12.7)

## 2025-03-27 PROCEDURE — 36415 COLL VENOUS BLD VENIPUNCTURE: CPT

## 2025-03-27 PROCEDURE — 1159F MED LIST DOCD IN RCRD: CPT | Mod: CPTII,S$GLB,,

## 2025-03-27 PROCEDURE — 3077F SYST BP >= 140 MM HG: CPT | Mod: CPTII,S$GLB,,

## 2025-03-27 PROCEDURE — 84100 ASSAY OF PHOSPHORUS: CPT

## 2025-03-27 PROCEDURE — 1101F PT FALLS ASSESS-DOCD LE1/YR: CPT | Mod: CPTII,S$GLB,,

## 2025-03-27 PROCEDURE — 85025 COMPLETE CBC W/AUTO DIFF WBC: CPT

## 2025-03-27 PROCEDURE — 1126F AMNT PAIN NOTED NONE PRSNT: CPT | Mod: CPTII,S$GLB,,

## 2025-03-27 PROCEDURE — 1160F RVW MEDS BY RX/DR IN RCRD: CPT | Mod: CPTII,S$GLB,,

## 2025-03-27 PROCEDURE — 83735 ASSAY OF MAGNESIUM: CPT

## 2025-03-27 PROCEDURE — 3288F FALL RISK ASSESSMENT DOCD: CPT | Mod: CPTII,S$GLB,,

## 2025-03-27 PROCEDURE — 84460 ALANINE AMINO (ALT) (SGPT): CPT

## 2025-03-27 PROCEDURE — 99999 PR PBB SHADOW E&M-EST. PATIENT-LVL V: CPT | Mod: PBBFAC,,,

## 2025-03-27 PROCEDURE — 1157F ADVNC CARE PLAN IN RCRD: CPT | Mod: CPTII,S$GLB,,

## 2025-03-27 PROCEDURE — 99214 OFFICE O/P EST MOD 30 MIN: CPT | Mod: S$GLB,,,

## 2025-03-27 PROCEDURE — 3078F DIAST BP <80 MM HG: CPT | Mod: CPTII,S$GLB,,

## 2025-03-27 PROCEDURE — 83880 ASSAY OF NATRIURETIC PEPTIDE: CPT

## 2025-03-27 RX ORDER — BUMETANIDE 1 MG/1
1 TABLET ORAL DAILY
Qty: 180 TABLET | Refills: 3 | Status: SHIPPED | OUTPATIENT
Start: 2025-03-27

## 2025-03-27 NOTE — PATIENT INSTRUCTIONS
Take an additional 1mg bumex this afternoon.    Will call today with lab results and any further fluid pill adjustments.    Notify us ([phone]) with any worsening shortness of breath, swelling or 3lb weight gain in 1 day/5lb weight gain in 1 week.

## 2025-03-27 NOTE — PROGRESS NOTES
Per SCAR Bell, can we call and let her know labs look stable. kidney function stable. Lytes wnl. BNP uptrended minimally. Recommend taking bumex 1mg BID today and tomorrow, then Saturday go back to 1mg bumex daily with afternoon doses only prn. We are good to dc.   Called and informed patient who stated understanding.

## 2025-03-28 ENCOUNTER — PATIENT MESSAGE (OUTPATIENT)
Dept: PRIMARY CARE CLINIC | Facility: CLINIC | Age: 80
End: 2025-03-28
Payer: MEDICARE

## 2025-03-28 DIAGNOSIS — I50.33 ACUTE ON CHRONIC DIASTOLIC HEART FAILURE: Primary | ICD-10-CM

## 2025-03-28 RX ORDER — POTASSIUM CHLORIDE 750 MG/1
20 CAPSULE, EXTENDED RELEASE ORAL DAILY
Qty: 180 CAPSULE | Refills: 2 | Status: SHIPPED | OUTPATIENT
Start: 2025-03-28

## 2025-03-31 ENCOUNTER — EXTERNAL HOME HEALTH (OUTPATIENT)
Dept: HOME HEALTH SERVICES | Facility: HOSPITAL | Age: 80
End: 2025-03-31
Payer: MEDICARE

## 2025-04-02 ENCOUNTER — TELEPHONE (OUTPATIENT)
Dept: ENDOCRINOLOGY | Facility: CLINIC | Age: 80
End: 2025-04-02
Payer: MEDICARE

## 2025-04-06 PROBLEM — E03.8 SUBCLINICAL HYPOTHYROIDISM: Status: ACTIVE | Noted: 2025-04-06

## 2025-04-06 PROBLEM — I44.2 COMPLETE HEART BLOCK: Status: ACTIVE | Noted: 2025-04-06

## 2025-04-06 PROBLEM — R57.9 SHOCK: Status: ACTIVE | Noted: 2025-04-06

## 2025-04-06 LAB
OHS QRS DURATION: 152 MS
OHS QRS DURATION: 156 MS
OHS QTC CALCULATION: 400 MS
OHS QTC CALCULATION: 488 MS

## 2025-04-06 NOTE — ASSESSMENT & PLAN NOTE
Most recent BNP and echo results are listed below.  Recent Labs     04/06/25  0234   *     Latest ECHO  Results for orders placed during the hospital encounter of 01/29/25    Echo    Interpretation Summary    Left Ventricle: The left ventricle is normal in size. Normal wall thickness. Normal wall motion. There is normal systolic function. Ejection fraction is approximately 55%.    Right Ventricle: Normal right ventricular cavity size. Wall thickness is normal. Systolic function is borderline low.    Left Atrium: Left atrium is severely dilated.    Mitral Valve: There is moderate posterior mitral annular calcification present.    Tricuspid Valve: There is mild regurgitation.    Pulmonary Artery: The estimated pulmonary artery systolic pressure is 51 mmHg.    IVC/SVC: Elevated venous pressure at 15 mmHg.    Current Heart Failure Medications: have been held due to bradycardia and MARQUEZ       Plan  - Monitor strict I&Os and daily weights.    - Place on telemetry  - repeating official echo, bedside echo appears to have normal LVEF, IVC cannot be assessed as patient intubated

## 2025-04-06 NOTE — PROCEDURES
"Misa Barajas is a 79 y.o. female patient.    Temp: 96.8 °F (36 °C) (04/06/25 0701)  Pulse: 90 (04/06/25 0722)  Resp: 20 (04/06/25 0722)  BP: (!) 149/70 (04/06/25 0722)  SpO2: 97 % (04/06/25 0722)  Weight: 65.8 kg (145 lb) (04/06/25 0212)  Height: 5' 4" (162.6 cm) (04/06/25 0212)       Central Line    Date/Time: 4/6/2025 8:23 AM    Performed by: Aguilar Sneed MD  Authorized by: Aguilar Sneed MD    Location procedure was performed:  [facility] CARDIOLOGY  Consent Done ?:  Yes  Time out complete?: Verified correct patient, procedure, equipment, staff, and site/side    Indications:  Vascular access, hemodynamic monitoring and med administration  Anesthesia:  Local infiltration  Local anesthetic:  Lidocaine 1% without epinephrine  Anesthetic total (ml):  3  Preparation:  Skin prepped with ChloraPrep  Skin prep agent dried: Skin prep agent completely dried prior to procedure    Sterile barriers: All five maximal sterile barriers used - gloves, gown, cap, mask and large sterile sheet    Hand hygiene: Hand hygiene performed immediately prior to central venous catheter insertion    Location:  Left internal jugular  Catheter type:  Triple lumen  Catheter size:  7 Fr  Ultrasound guidance: Yes    Vessel Caliber:  Small  Comprressibility:  Normal  Needle advanced into vessel with real time ultrasound guidance.    Guidewire confirmed in vessel.    Image recorded and saved.    Steril sheath on probe.    Sterile gel used.  Manometry: Yes    Number of attempts:  1  Securement:  Line sutured, chlorhexidine patch, sterile dressing applied and blood return through all ports  Complications: No    Estimated blood loss (mL):  1  Specimens: No    Guidewire: guidewire removed intact, verified with nurse    XRay:  Placement verified by x-ray, no pneumothorax on x-ray, tip termination and successful placement  Adverse Events:  NoneTermination Site: right atrium      4/6/2025    "

## 2025-04-06 NOTE — ED PROVIDER NOTES
Encounter Date: 4/6/2025       History     Chief Complaint   Patient presents with    Bradycardia     Arrives via EMS hypotensive 80s/50s and bradycadic in the 20s.     79-year-old female with a history of CHF presents via EMS with bradycardia.  She has been nauseous the last few days.  When EMS arrived she had a heart rate of 20.  They started epi and transcutaneous pacing.  Given Versed for the transcutaneous pacing.  They had to bag her in route.  Unable to get review of systems secondary to acuity of condition.  The patients available PMH, PSH, Social History, medications, allergies, and triage vital signs were reviewed just prior to their medical evaluation.         Review of patient's allergies indicates:   Allergen Reactions    Adhesive tape-silicones     Adhesive Rash     Pt states she removed a LATEX bandaid and the skin beneath was swollen and red. No other latex causes a reaction.     Past Medical History:   Diagnosis Date    Acute biliary pancreatitis 07/27/2024    Anemia 07/12/2024    Aortic atherosclerosis     Arthritis     knee joint pain    Asthma-COPD overlap syndrome 08/22/2022    home o2    Breast cancer 2002    left breast & lymph nodes-s/p sx with chemo    Cataracts, bilateral     Chronic diastolic heart failure 12/24/2019    Chronic kidney disease, stage 3     Class 1 obesity due to excess calories with serious comorbidity and body mass index (BMI) of 30.0 to 30.9 in adult 05/16/2024    History of chemotherapy     last treatment 12/2002 (had 8 treatments)    Hyperlipidemia 11/20/2016    Hypertension     Lymphedema of both lower extremities 01/25/2022    Nephrolithiasis 06/02/2014    Osteoporosis     Paroxysmal atrial fibrillation 06/07/2021    Renal osteodystrophy 01/14/2016    Venous stasis dermatitis of both lower extremities 01/25/2022    Vitamin D insufficiency      Past Surgical History:   Procedure Laterality Date    ABLATION, ATRIAL FLUTTER, TYPICAL N/A 6/17/2024    Procedure: Ablation,  Atrial Flutter, Typical;  Surgeon: Taz Church MD;  Location: [facility] EP LAB;  Service: Cardiology;  Laterality: N/A;  AFL, RFA, LORENE, MAKAYLA (cx if SR), LEANNE miller, 3 Prep    BREAST BIOPSY Left 2002    core bx, +    BREAST BIOPSY Right 2018    core    BREAST BIOPSY Right 2019    BREAST LUMPECTOMY Left 2002    CYSTOSCOPY  4/28/2022    Procedure: CYSTOSCOPY;  Surgeon: Vik Ware MD;  Location: [facility] OR 1ST FLR;  Service: Urology;;    CYSTOSCOPY  5/30/2022    Procedure: CYSTOSCOPY;  Surgeon: Bonnie Knutson MD;  Location: [facility] OR [facility] FLR;  Service: Urology;;    ECHOCARDIOGRAM,TRANSESOPHAGEAL N/A 6/17/2024    Procedure: Transesophageal echo (MAKAYLA) intra-procedure log documentation;  Surgeon: Nestor Martinez MD;  Location: [facility] EP LAB;  Service: Cardiology;  Laterality: N/A;    ERCP N/A 7/30/2024    Procedure: ERCP (ENDOSCOPIC RETROGRADE CHOLANGIOPANCREATOGRAPHY);  Surgeon: Sierra Cotto MD;  Location: [facility] ENDO (2ND FLR);  Service: Endoscopy;  Laterality: N/A;    LASER LITHOTRIPSY  5/30/2022    Procedure: LITHOTRIPSY, USING LASER;  Surgeon: Bonnie Knutson MD;  Location: [facility] OR 1ST FLR;  Service: Urology;;    LITHOTRIPSY      MASTECTOMY Left 06/2002    left-& lymph node dissection    REPLACEMENT OF STENT Right 5/30/2022    Procedure: REPLACEMENT, STENT;  Surgeon: Bonnie Knutson MD;  Location: [facility] OR [facility] FLR;  Service: Urology;  Laterality: Right;    RETROGRADE PYELOGRAPHY Right 4/28/2022    Procedure: PYELOGRAM, RETROGRADE;  Surgeon: Vik Ware MD;  Location: [facility] OR 1ST FLR;  Service: Urology;  Laterality: Right;    ROBOT-ASSISTED LAPAROSCOPIC PARTIAL NEPHRECTOMY USING DA JESÚS XI Right 3/11/2021    Procedure: XI ROBOTIC NEPHRECTOMY, PARTIAL;  Surgeon: Taz Ortega MD;  Location: [facility] OR 2ND FLR;  Service: Urology;  Laterality: Right;  4hr/ gen with regional  [facility] confirmation:  [id number] for 11:15am case NC    URETEROSCOPIC REMOVAL OF URETERIC CALCULUS Right  5/30/2022    Procedure: REMOVAL, CALCULUS, URETER, URETEROSCOPIC;  Surgeon: Bonnie Knutson MD;  Location: [facility] OR [address] [place] [address];  Service: Urology;  Laterality: Right;    ureteroscopy Bilateral     6.14    URETEROSCOPY Right 5/30/2022    Procedure: URETEROSCOPY;  Surgeon: Bonnie Knutson MD;  Location: [facility] OR [address] [place] [address];  Service: Urology;  Laterality: Right;     Family History   Problem Relation Name Age of Onset    Bone cancer Mother Bibi     Breast cancer Mother Bibi     Arthritis Mother Bibi         Breast Cancer    Cancer Father Maxwell         Asbestos cancer    Arthritis Sister          Breast Cancer    No Known Problems Brother      Fibroids Daughter      Crohn's disease Daughter      No Known Problems Daughter      Anesthesia problems Neg Hx      Glaucoma Neg Hx       Social History[1]  Review of Systems    Physical Exam     Initial Vitals   BP Pulse Resp Temp SpO2   04/06/25 0223 04/06/25 0217 04/06/25 0217 -- 04/06/25 0217   (!) 123/56 73 19  100 %      MAP       --                Physical Exam    Nursing note and vitals reviewed.  Constitutional: She appears well-developed and well-nourished. She is not diaphoretic. No distress.   HENT:   Head: Normocephalic and atraumatic.   Nose: Nose normal.   Eyes: Conjunctivae are normal. Right eye exhibits no discharge. Left eye exhibits no discharge.   Neck: Neck supple.   Normal range of motion.  Cardiovascular:  Normal rate, regular rhythm and normal heart sounds.     Exam reveals no gallop and no friction rub.       No murmur heard.  Pulmonary/Chest: Breath sounds normal. No respiratory distress. She has no wheezes. She has no rhonchi. She has no rales.   Abdominal: Abdomen is soft. She exhibits no distension. There is no abdominal tenderness. There is no rebound and no guarding.   Musculoskeletal:         General: Edema present. No tenderness. Normal range of motion.      Cervical back: Normal range of motion and neck supple.      Comments: R>L      Neurological: GCS eye subscore is 4. GCS verbal subscore is 5. GCS motor subscore is 6.   Obtunded and critically ill   Skin: Skin is warm and dry. No rash noted. There is erythema.   Candida infection to right groin         ED Course   Procedures  Labs Reviewed   B-TYPE NATRIURETIC PEPTIDE - Abnormal       Result Value     (*)    COMPREHENSIVE METABOLIC PANEL - Abnormal    Sodium 136      Potassium 6.0 (*)     Chloride 111 (*)     CO2 19 (*)     Glucose 209 (*)     BUN 29 (*)     Creatinine 2.1 (*)     Calcium >19.0 (*)     Protein Total 6.0      Albumin 2.8 (*)     Bilirubin Total 0.4            AST 46 (*)     ALT 28      Anion Gap 6 (*)     eGFR 23 (*)    TSH - Abnormal    TSH 10.722 (*)    CBC WITH DIFFERENTIAL - Abnormal    WBC 10.54      RBC 4.39      HGB 12.4      HCT 40.7      MCV 93      MCH 28.2      MCHC 30.5 (*)     RDW 14.6 (*)     Platelet Count 224      MPV 10.1      Nucleated RBC 0      Neut % 50.8      Lymph % 34.3      Mono % 9.2      Eos % 4.1      Basophil % 1.1      Imm Grans % 0.5      Neut # 5.36      Lymph # 3.61      Mono # 0.97      Eos # 0.43      Baso # 0.12      Imm Grans # 0.05 (*)    MAGNESIUM - Normal    Magnesium  2.2     TROPONIN I HIGH SENSITIVITY - Normal    Troponin High Sensitive 14     CBC W/ AUTO DIFFERENTIAL    Narrative:     The following orders were created for panel order CBC auto differential.  Procedure                               Abnormality         Status                     ---------                               -----------         ------                     CBC with Differential[[id number]]       Abnormal            Final result                 Please view results for these tests on the individual orders.   T4, FREE   COMPREHENSIVE METABOLIC PANEL   ISTAT CHEM8          Imaging Results    None          Medications   ketamine in 0.9 % sod chloride 50 mg/5 mL (10 mg/mL) injection (  Canceled Entry 4/6/25 1218)   fentaNYL 2500 mcg in 0.9% sodium  chloride 250 mL infusion premix (50 mcg/hr Intravenous New Bag 4/6/25 0243)   atropine injection (1 mg Intravenous Given 4/6/25 0213)   EPINEPHrine (ADRENALIN) 10 mg infusion (0 mcg/kg/min × 65.8 kg Intravenous Stopped 4/6/25 0228)   ketamine injection 80 mg ( Intravenous Canceled Entry 4/6/25 0230)   rocuronium injection 100 mg (100 mg Intravenous Back Association 4/6/25 0330)   calcium chloride 100 mg/mL (10 %) injection 10 mL (10 mLs Intravenous Given 4/6/25 0214)     Medical Decision Making  79-year-old female presents with profound bradycardia.  She is paced on arrival.  Transferred carefully from EMS equipment to our equipment.  Continued epi drip and transcutaneous pacing.  Initial labs with mild lactic acidosis.  Intubated for airway protection.  Right IJ Cordis placed for transvenous pacing.  Discussed with cardiology who assisted at bedside.  Will admit to CCU.  Family updated at bedside.    Amount and/or Complexity of Data Reviewed  Independent Historian: caregiver and EMS  Labs: ordered. Decision-making details documented in ED Course.  Radiology: ordered and independent interpretation performed. Decision-making details documented in ED Course.    Risk  Prescription drug management.  Decision regarding hospitalization.    Critical Care  Total time providing critical care: 70 minutes                     Critical Care:  Date: 04/06/2025  Performed by: Dr. Cal Sewell   Authorized by: Dr. Cla Sewell  Total critical care time (exclusive of procedural time) : 70 minutes  Critical care was necessary to treat or prevent imminent or life-threatening deterioration of the following conditions:  bradycardia needing pacing                  Clinical Impression:  Final diagnoses:  [R00.1] Bradycardia (Primary)  [Z01.818] Encounter for intubation  [Z45.2] Encounter for central line placement          ED Disposition Condition    Admit Stable                    [1]   Social History  Tobacco Use    Smoking  status: Former     Current packs/day: 0.00     Average packs/day: 1 pack/day for 50.0 years (50.0 ttl pk-yrs)     Types: Cigarettes     Start date: 1960     Quit date: 5/20/2009     Years since quitting: 15.8    Smokeless tobacco: Former   Substance Use Topics    Alcohol use: No    Drug use: No        Cal Sewell MD  04/06/25 0359

## 2025-04-06 NOTE — NURSING
Pt arrived to the unit via stretcher on monitor and vented. Pt became increasingly agitated. Sedation titrated. Bedside ABG completed. Provider notified of her arrival.

## 2025-04-06 NOTE — CONSULTS
[facility]  Endocrinology  Consult Note    Consult Requested by: Honorio Ortiz MD   Reason for admit: Complete heart block    HISTORY OF PRESENT ILLNESS:  Ms. Barajas is an 80 yo female who presented to [facility] [place] with altered mental status and bradycardia requiring epinephrine and transcutaneous pacing by EMS on the field . Patient has a known medical history of COPD on 2L home O2, HFpEF, AFL s/p RFA (6/17/24 w/ Dr. Church), CKD III, HTN, HLD, chronic venous insufficiency, orthostatic hypotension. When seen this morning on 4/6/25, patient was intubated and sedated with vasopressor support, no family was at bedside therefore no history was obtained, all history is taken from chart review. On admission to the ED patient was found to have a TSH of 10.7 and free T4 of 0.78  indicating subclinical hypothyroid. On chart review it does not appear that patient has been diagnosed with thyroid disease in the past or has been on any thyroid replacement medications in the past. There are TSH values which are slightly above normal limits in the past but no above 5 uIU/mL. No exposure to amiodarone or lithium. Endocrinology was consulted for management of abnormal thyroid labs.    Medications and/or Treatments Impacting Glycemic Control:  Immunotherapy:    Immunosuppressants       None          Steroids:   Hormones (From admission, onward)      Start     Stop Route Frequency Ordered    04/06/25 0830  vasopressin (PITRESSIN) 0.2 Units/mL in 0.9% NaCl 100 mL infusion         -- IV Continuous 04/06/25 0720          Pressors:    Autonomic Drugs (From admission, onward)      Start     Stop Route Frequency Ordered    04/06/25 0715  NORepinephrine 4 mg in sodium chloride 0.9% 250 mL infusion (premix)        Question Answer Comment   Begin at (in mcg/kg/min): 0.02    Titrate by: (in mcg/kg/min (RANGE PREFERRED) 0.02 - 0.2    Titrate interval: (in minutes) (RANGE PREFERRED) 2 - 5    Titrate to maintain: (MAP or  "SBP) MAP    Titrate to keep MAP within the range or GREATER than (if single number): (in mmHg) OTHER    Other 60    Maximum dose: (in mcg/kg/min) 3        -- IV Continuous 04/06/25 0710            Prescriptions Prior to Admission[1]    Current Medications[2]    PMH, PSH, FH, SH updated and reviewed     ROS:  Unable to obtain due to: Sedation,Intubation,Altered mental status,Critical illness,Reviewed ROS from note dated 4/6/25    Review of Systems   Unable to perform ROS: Intubated       Labs Reviewed and Include:    Recent Labs   Lab 04/06/25  0234 04/06/25  0833   CALCIUM >19.0* 10.3   ALBUMIN 2.8*  --     137   K 6.0* 4.3   CO2 19* 18*   * 109   BUN 29* 31*   CREATININE 2.1* 1.9*   ALKPHOS 139  --    ALT 28  --    AST 46*  --    BILITOT 0.4  --      Lab Results   Component Value Date    HGBA1C 4.9 01/30/2025       Nutritional status:   Body mass index is 28.38 kg/m².  Lab Results   Component Value Date    ALBUMIN 2.8 (L) 04/06/2025    ALBUMIN 3.0 (L) 03/27/2025    ALBUMIN 2.8 (L) 02/18/2025     No results found for: "PREALBUMIN"    Estimated Creatinine Clearance: 23 mL/min (A) (based on SCr of 1.9 mg/dL (H)).    POCT Glucose results:    Current Insulin Regimen:       PHYSICAL EXAMINATION:  Vitals:    04/06/25 1001   BP:    Pulse: 81   Resp: 20   Temp: (!) 93.6 °F (34.2 °C)     Body mass index is 28.38 kg/m².     Physical Exam  Constitutional:       Appearance: She is ill-appearing.   Cardiovascular:      Comments: Currently with vasopressor use  Pulmonary:      Comments: Placed on mechanical ventilation  Musculoskeletal:      Cervical back: Neck supple.        .     ASSESSMENT and PLAN:    Cardiac/Vascular  * Complete heart block  Cardiology following and managing      Shock  Currently on vasopressor support  Cardiogenic vs septic  Primary team managing      Endocrine  Subclinical hypothyroidism  Patient presented with AMS and bradycardia  Thyroid labs indicative of subclinical hypothyroid on this " admission, no apparent history of thyroid disease in the past     Lab Results   Component Value Date    TSH 10.722 (H) 04/06/2025    FREET4 0.78 04/06/2025      -In the setting of subclinical hypothyroid with these particular levels are not too impressive, there is a consideration of repeating labs after acute illness resolves instead of treatment however do not suspect these labs are contributing to current clinical presentation. I do not suspect this to be due to thyroid disease however will treat in the short term at least due to TSH level above 10.  -Due to TSH being above 10 will start patient off with levothyroxine 50 mcg tablets via any feeding tube, please ensure if any feeds are started to hold feeds at least 1 hour prior to and after levothryoxine administration to ensure proper absorption of medication  -Patient can be discharged home with levothyroxine 50 mcg upon discharge, please ensure patient is aware to take in the morning after an overnight fast and to wait at least 30 minutes prior to eating, drinking, or taking any other medications during that time.  -Can follow up outpatient with Endocrine department or PCP with thyroid labs in 4-6 weeks, please reach out to Endocrine to help in setting up labs and getting scheduled for discharge clinic.      Endocrinology will sign off at this time. Please reach out for any questions or concerns.      DISCHARGE NEEDS:   Please prescribe levothyroxine 50 mcg on discharge  Please reach out to Endocrinology at time of discharge to assist with repeat thyroid labs in 4-6 weeks and set up with fellow discharge clinic.    Stephan Shaw MD  Endocrinology  [facility]        [1]   Medications Prior to Admission   Medication Sig Dispense Refill Last Dose/Taking    acetaminophen (TYLENOL) 500 MG tablet        albuterol (VENTOLIN HFA) 90 mcg/actuation inhaler Inhale 1 puff into the lungs.       albuterol sulfate (PROAIR RESPICLICK) 90 mcg/actuation  inhaler 2 puffs as needed Inhalation every 6 hrs 30 days 1 each 0     amoxicillin-clavulanate 500-125mg (AUGMENTIN) 500-125 mg Tab Take 1 tablet by mouth.       amoxicillin-clavulanate 875-125mg (AUGMENTIN) 875-125 mg per tablet Take 1 tablet by mouth 2 (two) times daily.       aspirin (ECOTRIN) 81 MG EC tablet 1 tablet Orally Once a day 30 days 30 tablet 0     BD POSIFLUSH NORMAL SALINE 0.9 injection        biotin 1 mg Cap 1 capsule Orally Once a day 30 days 30 capsule 0     budesonide-glycopyr-formoterol (BREZTRI AEROSPHERE) 160-9-4.8 mcg/actuation HFAA Inhale 2 puffs into the lungs 2 (two) times daily. 32.1 g 3     bumetanide (BUMEX) 1 MG tablet Take 1 tablet (1 mg total) by mouth once daily. Okay to take bumex 1mg twice daily as needed for worsening shortness of breath, swelling or 3lb weight gain 180 tablet 3     busPIRone (BUSPAR) 5 MG Tab 1 tablet Orally once daily as needed 30 days 30 tablet 0     ciprofloxacin HCl (CIPRO) 500 MG tablet Take 500 mg by mouth.       ferrous sulfate (FEOSOL) 325 mg (65 mg iron) Tab tablet Take 1 tablet (325 mg total) by mouth every other day. 45 tablet 2     fluticasone propionate (FLONASE) 50 mcg/actuation nasal spray 1 spray by Each Nostril route daily as needed for Rhinitis or Allergies.       inhalation spacing device ([facility]) Use as directed for inhalation. 1 each 0     levalbuterol (XOPENEX) 1.25 mg/3 mL nebulizer solution Take 1 ampule by nebulization every 8 (eight) hours as needed for Wheezing or Shortness of Breath.       magnesium hydroxide 400 mg/5 ml (MILK OF MAGNESIA) 400 mg/5 mL Susp 30 ml as neded Orally once daily       meclizine (ANTIVERT) 12.5 mg tablet Take 1 tablet (12.5 mg total) by mouth Daily. 90 tablet 3     metoprolol succinate (TOPROL-XL) 25 MG 24 hr tablet Take ½ tablet (12.5 mg total) by mouth once daily. 45 tablet 3     midodrine (PROAMATINE) 2.5 MG Tab Take 1 tablet (2.5 mg total) by mouth 3 (three) times daily. 270 tablet 3      ondansetron (ZOFRAN) 4 MG tablet 1 tablet as needed for nausea Orally every 8 hours 7 days 21 tablet 0     polyethylene glycol (MIRALAX) 17 gram/dose powder        potassium chloride (MICRO-K) 10 MEQ CpSR Take 2 capsules (20 mEq total) by mouth once daily. 180 capsule 2     potassium chloride SA (K-DUR,KLOR-CON M) 10 MEQ tablet Take 10 mEq by mouth.       potassium chloride SA (K-DUR,KLOR-CON) 20 MEQ tablet Take 20 mEq by mouth.       predniSONE (DELTASONE) 10 MG tablet Take 10 mg by mouth 2 (two) times daily.       Saccharomyces boulardii (FLORASTOR) 250 mg capsule Take 1 capsule by mouth.       senna-docusate 8.6-50 mg (PERICOLACE) 8.6-50 mg per tablet Take by mouth once daily.       traZODone (DESYREL) 50 MG tablet Take 0.5 tablets (25 mg total) by mouth nightly as needed for Insomnia (Anxiety or insomnia).       triamcinolone acetonide 0.1% (KENALOG) 0.1 % cream Apply externally to affected area twice a day until clear 80 g 1     vitamin D (VITAMIN D3) 1000 units Tab Take 2 tablets (2,000 Units total) by mouth once daily. 180 tablet 3    [2]   Current Facility-Administered Medications   Medication Dose Route Frequency Provider Last Rate Last Admin    0.9% NaCl infusion   Intravenous Continuous Honorio Ortiz MD 10 mL/hr at 04/06/25 1101 Rate Verify at 04/06/25 1101    dexmedetomidine (PRECEDEX) 400mcg/100mL 0.9% NaCL infusion  0-1.4 mcg/kg/hr Intravenous Continuous Cedrick Echevarria MD 11.52 mL/hr at 04/06/25 1101 0.7 mcg/kg/hr at 04/06/25 1101    dextrose 50% injection 25 g  25 g Intravenous PRN Cedrick Echevarria MD        fentaNYL 2500 mcg in 0.9% sodium chloride 250 mL infusion premix  0-250 mcg/hr Intravenous Continuous Cal Sewell MD 10 mL/hr at 04/06/25 1101 100 mcg/hr at 04/06/25 1101    fentaNYL 50 mcg/mL injection 50 mcg  50 mcg Intravenous Q1H PRN Cedrick Echevarria MD        heparin (porcine) injection 5,000 Units  5,000 Units Subcutaneous Q8H Pedro Max MD         ketamine in 0.9 % sod chloride 50 mg/5 mL (10 mg/mL) injection     Cal Sewell MD        NORepinephrine 4 mg in sodium chloride 0.9% 250 mL infusion (premix)  0-3 mcg/kg/min Intravenous Continuous Aguilar Sneed MD 2.5 mL/hr at 04/06/25 1101 0.01 mcg/kg/min at 04/06/25 1101    pantoprazole injection 40 mg  40 mg Intravenous Daily Aguilar Sneed MD        piperacillin-tazobactam (ZOSYN) 4.5 g in D5W 100 mL IVPB (MB+)  4.5 g Intravenous Q12H Cedrick Echevarria MD        polyethylene glycol packet 17 g  17 g Per OG tube BID Pedro Max MD        senna tablet 8.6 mg  8.6 mg Per OG tube Daily Pedro Max MD        sodium chloride 0.9% bolus 500 mL 500 mL  500 mL Intravenous Once Pedro Max MD        sodium chloride 0.9% flush 10 mL  10 mL Intravenous PRN Cedrick Echevarria MD        vancomycin - pharmacy to dose   Intravenous pharmacy to manage frequency Cedrick Echevarria MD        vasopressin (PITRESSIN) 0.2 Units/mL in 0.9% NaCl 100 mL infusion  0.04 Units/min Intravenous Continuous Honorio Ortiz MD   Stopped at 04/06/25 0903

## 2025-04-06 NOTE — ASSESSMENT & PLAN NOTE
Now MARQUEZ on CKD3. Nephrology consulted. Patient on bumex and potassium supplement as home which have been held

## 2025-04-06 NOTE — HPI
Ms. Misa Barajas is a 78 yo female with COPD on 2L home O2, HFpEF, AFL s/p RFA (6/17/24 w/ Dr. Church), CKD III, HTN, HLD, chronic venous insufficiency, orthostatic hypotension who was brought via EMS after per history she was found confused and bradycardiac with HR in the 20s.They started epi and transcutaneous pacing. Given Versed for the transcutaneous pacing. They had to bag her in route. We do not have strip of underlying rhythm. Patient obtunded for which she was intubated on arrival to the ED and transcutaneous pacing was exchanged to transvenous pacing via RIJ. Underlying rate 44 bifascicular block. Known RBBB. Per daughters patient had called them over the phone when they noticed she appeared confused. Due to concerns for a possible syncopal episode EMS was called. Prior to event patient had been complaining of malaise and nausea for which she took about two Zofran pills per the daughters.     She had been recently seen on 3/27/25 at the [facility] visit following recent admission for AHRF 2/2 ADHF and COPD exacerbation. On ED presentation BNP elevated to 2500, troponin elevated to 19, CXR with pulmonary edema. She was started on steroids, nebs and antibiotics in addition to IV diuresis. Updated TTE stable from prior, showing HFpEF with elevated CVP 15. Her O2 requirements improved to baseline with diuresis and she was subsequently discharged. At that visit patient had not required midodrine as blood pressure had remained stable and she was asymptomatic.     Off note, patient was taken off OAC due to bleeding issues and no recurrent of typical atrial flutter after ablation.

## 2025-04-06 NOTE — ASSESSMENT & PLAN NOTE
Prior history of smoking. Continue scheduled inhalers once extubated. Antibiotics and Supplemental oxygen and monitor respiratory status closely.

## 2025-04-06 NOTE — H&P
[facility] - Emergency Dept  Cardiology  History and Physical     Patient Name: Misa Barajas  MRN: [id number]  Admission Date: 4/6/2025  Code Status: Full Code   Attending Provider: Honorio Ortiz MD   Primary Care Physician: Isela Langston MD  Principal Problem:Shock    Patient information was obtained from relative(s), past medical records, and ER records.     Subjective:     Chief Complaint:  Unable to obtain     HPI:  Ms. Misa Barajas is a 80 yo female with COPD on 2L home O2, HFpEF, AFL s/p RFA (6/17/24 w/ Dr. Church), CKD III, HTN, HLD, chronic venous insufficiency, orthostatic hypotension who was brought via EMS after per history she was found confused and bradycardiac with HR in the 20s.They started epi and transcutaneous pacing. Given Versed for the transcutaneous pacing. They had to bag her in route. We do not have strip of underlying rhythm. Patient obtunded for which she was intubated for airway protection on arrival to the ED and transcutaneous pacing was exchanged to transvenous pacing via RIJ (threshold 0.6). Underlying rate 44 bifascicular block. Known RBBB. Per daughters patient had called them over the phone when they noticed she appeared confused. Due to concerns for a possible syncopal episode EMS was called. Prior to event patient had been complaining of malaise and nausea for which she took about two Zofran pills per the daughters.     She had been recently seen on 3/27/25 at the [facility] visit following recent admission for AHRF 2/2 ADHF and COPD exacerbation. On ED presentation BNP elevated to 2500, troponin elevated to 19, CXR with pulmonary edema. She was started on steroids, nebs and antibiotics in addition to IV diuresis. Updated TTE stable from prior, showing HFpEF with elevated CVP 15. Her O2 requirements improved to baseline with diuresis and she was subsequently discharged. At that visit patient had not required midodrine as blood pressure had remained stable and she was  asymptomatic.     Off note, patient was taken off OAC due to bleeding issues and no recurrent of typical atrial flutter after ablation.                   Past Medical History:   Diagnosis Date    Acute biliary pancreatitis 07/27/2024    Anemia 07/12/2024    Aortic atherosclerosis     Arthritis     knee joint pain    Asthma-COPD overlap syndrome 08/22/2022    home o2    Breast cancer 2002    left breast & lymph nodes-s/p sx with chemo    Cataracts, bilateral     Chronic diastolic heart failure 12/24/2019    Chronic kidney disease, stage 3     Class 1 obesity due to excess calories with serious comorbidity and body mass index (BMI) of 30.0 to 30.9 in adult 05/16/2024    History of chemotherapy     last treatment 12/2002 (had 8 treatments)    Hyperlipidemia 11/20/2016    Hypertension     Lymphedema of both lower extremities 01/25/2022    Nephrolithiasis 06/02/2014    Osteoporosis     Paroxysmal atrial fibrillation 06/07/2021    Renal osteodystrophy 01/14/2016    Venous stasis dermatitis of both lower extremities 01/25/2022    Vitamin D insufficiency        Past Surgical History:   Procedure Laterality Date    ABLATION, ATRIAL FLUTTER, TYPICAL N/A 6/17/2024    Procedure: Ablation, Atrial Flutter, Typical;  Surgeon: Taz Church MD;  Location: [facility] LAB;  Service: Cardiology;  Laterality: N/A;  AFL, RFA, LORENE, MAKAYLA (cx if SR), LEANNE miller, 3 Prep    BREAST BIOPSY Left 2002    core bx, +    BREAST BIOPSY Right 2018    core    BREAST BIOPSY Right 2019    BREAST LUMPECTOMY Left 2002    CYSTOSCOPY  4/28/2022    Procedure: CYSTOSCOPY;  Surgeon: Vik Ware MD;  Location: [facility] OR [address] [place] [address];  Service: Urology;;    CYSTOSCOPY  5/30/2022    Procedure: CYSTOSCOPY;  Surgeon: Bonnie Knutson MD;  Location: [facility] OR [address] [place] [address];  Service: Urology;;    ECHOCARDIOGRAM,TRANSESOPHAGEAL N/A 6/17/2024    Procedure: Transesophageal echo (MAKAYLA) intra-procedure log documentation;  Surgeon: Nestor Martinez MD;  Location: [facility]  LAB;  Service: Cardiology;  Laterality: N/A;    ERCP N/A 7/30/2024    Procedure: ERCP (ENDOSCOPIC RETROGRADE CHOLANGIOPANCREATOGRAPHY);  Surgeon: Sierra Cotto MD;  Location: [facility] ENDO (2ND FLR);  Service: Endoscopy;  Laterality: N/A;    LASER LITHOTRIPSY  5/30/2022    Procedure: LITHOTRIPSY, USING LASER;  Surgeon: Bonnie Knutson MD;  [facility];  Service: Urology;;    LITHOTRIPSY      MASTECTOMY Left 06/2002    left-& lymph node dissection    REPLACEMENT OF STENT Right 5/30/2022    Procedure: REPLACEMENT, STENT;  Surgeon: Bonnie Knutson MD;  [facility];  Service: Urology;  Laterality: Right;    RETROGRADE PYELOGRAPHY Right 4/28/2022    Procedure: PYELOGRAM, RETROGRADE;  Surgeon: Vik Ware MD;  [facility];  Service: Urology;  Laterality: Right;    ROBOT-ASSISTED LAPAROSCOPIC PARTIAL NEPHRECTOMY USING DA JESÚS XI Right 3/11/2021    Procedure: XI ROBOTIC NEPHRECTOMY, PARTIAL;  Surgeon: Taz Ortega MD;  Location: [facility] OR 2ND FLR;  Service: Urology;  Laterality: Right;  4hr/ gen with regional  [facility] confirmation:  [phone] for 11:15am case NC    URETEROSCOPIC REMOVAL OF URETERIC CALCULUS Right 5/30/2022    Procedure: REMOVAL, CALCULUS, URETER, URETEROSCOPIC;  Surgeon: Bonnie Knutson MD;  [facility];  Service: Urology;  Laterality: Right;    ureteroscopy Bilateral     6.14    URETEROSCOPY Right 5/30/2022    Procedure: URETEROSCOPY;  Surgeon: Bonnie Knutson MD;  [facility];  Service: Urology;  Laterality: Right;       Review of patient's allergies indicates:   Allergen Reactions    Adhesive tape-silicones     Adhesive Rash     Pt states she removed a LATEX bandaid and the skin beneath was swollen and red. No other latex causes a reaction.       No current facility-administered medications on file prior to encounter.     Current Outpatient Medications on File Prior to Encounter   Medication Sig     acetaminophen (TYLENOL) 500 MG tablet     albuterol (VENTOLIN HFA) 90 mcg/actuation inhaler Inhale 1 puff into the lungs.    albuterol sulfate (PROAIR RESPICLICK) 90 mcg/actuation inhaler 2 puffs as needed Inhalation every 6 hrs 30 days    amoxicillin-clavulanate 500-125mg (AUGMENTIN) 500-125 mg Tab Take 1 tablet by mouth.    amoxicillin-clavulanate 875-125mg (AUGMENTIN) 875-125 mg per tablet Take 1 tablet by mouth 2 (two) times daily.    aspirin (ECOTRIN) 81 MG EC tablet 1 tablet Orally Once a day 30 days    BD POSIFLUSH NORMAL SALINE 0.9 injection     biotin 1 mg Cap 1 capsule Orally Once a day 30 days    budesonide-glycopyr-formoterol (BREZTRI AEROSPHERE) 160-9-4.8 mcg/actuation HFAA Inhale 2 puffs into the lungs 2 (two) times daily.    bumetanide (BUMEX) 1 MG tablet Take 1 tablet (1 mg total) by mouth once daily. Okay to take bumex 1mg twice daily as needed for worsening shortness of breath, swelling or 3lb weight gain    busPIRone (BUSPAR) 5 MG Tab 1 tablet Orally once daily as needed 30 days    ciprofloxacin HCl (CIPRO) 500 MG tablet Take 500 mg by mouth.    ferrous sulfate (FEOSOL) 325 mg (65 mg iron) Tab tablet Take 1 tablet (325 mg total) by mouth every other day.    fluticasone propionate (FLONASE) 50 mcg/actuation nasal spray 1 spray by Each Nostril route daily as needed for Rhinitis or Allergies.    inhalation spacing device ([facility]) Use as directed for inhalation.    levalbuterol (XOPENEX) 1.25 mg/3 mL nebulizer solution Take 1 ampule by nebulization every 8 (eight) hours as needed for Wheezing or Shortness of Breath.    magnesium hydroxide 400 mg/5 ml (MILK OF MAGNESIA) 400 mg/5 mL Susp 30 ml as neded Orally once daily    meclizine (ANTIVERT) 12.5 mg tablet Take 1 tablet (12.5 mg total) by mouth Daily.    metoprolol succinate (TOPROL-XL) 25 MG 24 hr tablet Take ½ tablet (12.5 mg total) by mouth once daily.    midodrine (PROAMATINE) 2.5 MG Tab Take 1 tablet (2.5 mg total) by mouth 3  (three) times daily.    ondansetron (ZOFRAN) 4 MG tablet 1 tablet as needed for nausea Orally every 8 hours 7 days    polyethylene glycol (MIRALAX) 17 gram/dose powder     potassium chloride (MICRO-K) 10 MEQ CpSR Take 2 capsules (20 mEq total) by mouth once daily.    potassium chloride SA (K-DUR,KLOR-CON M) 10 MEQ tablet Take 10 mEq by mouth.    potassium chloride SA (K-DUR,KLOR-CON) 20 MEQ tablet Take 20 mEq by mouth.    predniSONE (DELTASONE) 10 MG tablet Take 10 mg by mouth 2 (two) times daily.    Saccharomyces boulardii (FLORASTOR) 250 mg capsule Take 1 capsule by mouth.    senna-docusate 8.6-50 mg (PERICOLACE) 8.6-50 mg per tablet Take by mouth once daily.    traZODone (DESYREL) 50 MG tablet Take 0.5 tablets (25 mg total) by mouth nightly as needed for Insomnia (Anxiety or insomnia).    triamcinolone acetonide 0.1% (KENALOG) 0.1 % cream Apply externally to affected area twice a day until clear    vitamin D (VITAMIN D3) 1000 units Tab Take 2 tablets (2,000 Units total) by mouth once daily.     Family History       Problem Relation (Age of Onset)    Arthritis Mother, Sister    Bone cancer Mother    Breast cancer Mother    Cancer Father    Crohn's disease Daughter    Fibroids Daughter    No Known Problems Brother, Daughter          Tobacco Use    Smoking status: Former     Current packs/day: 0.00     Average packs/day: 1 pack/day for 50.0 years (50.0 ttl pk-yrs)     Types: Cigarettes     Start date: 1960     Quit date: 5/20/2009     Years since quitting: 15.8    Smokeless tobacco: Former   Substance and Sexual Activity    Alcohol use: No    Drug use: No    Sexual activity: Not Currently     Partners: Male     Birth control/protection: Partner-Vasectomy     Review of Systems   Unable to perform ROS: Intubated     Objective:     Vital Signs (Most Recent):  Temp: (!) 95.5 °F (35.3 °C) (04/06/25 0502)  Pulse: 62 (04/06/25 0502)  Resp: 20 (04/06/25 0502)  BP: (!) 139/99 (04/06/25 0435)  SpO2: 100 % (04/06/25 0502)  Vital Signs (24h Range):  Temp:  [95.5 °F (35.3 °C)-95.7 °F (35.4 °C)] 95.5 °F (35.3 °C)  Pulse:  [46-92] 62  Resp:  [18-42] 20  SpO2:  [97 %-100 %] 100 %  BP: (108-218)/(51-99) 139/99     Weight: 65.8 kg (145 lb)  Body mass index is 24.89 kg/m².    SpO2: 100 %       No intake or output data in the 24 hours ending 04/06/25 0513    Lines/Drains/Airways       Drain  Duration                  NG/OG Tube 04/06/25 0349 Left mouth <1 day              Airway  Duration                  Airway - Non-Surgical 04/06/25 0228 Endotracheal Tube <1 day              Line  Duration                  Pacer Wires 04/06/25 0256 <1 day              Peripheral Intravenous Line  Duration                  Peripheral IV - Single Lumen 04/06/25 0216 18 G Posterior;Right Forearm <1 day         Peripheral IV - Single Lumen 04/06/25 0426 18 G Anterior;Right Hand <1 day         Peripheral IV - Single Lumen 04/06/25 20 G Left Antecubital <1 day                     Physical Exam  Vitals and nursing note reviewed.   Constitutional:       Appearance: She is ill-appearing.      Comments: Intubated and sedated   HENT:      Head: Normocephalic and atraumatic.      Mouth/Throat:      Mouth: Mucous membranes are moist.   Eyes:      General: No scleral icterus.  Cardiovascular:      Pulses:           Carotid pulses are 2+ on the right side and 2+ on the left side.       Radial pulses are 2+ on the right side and 2+ on the left side.        Dorsalis pedis pulses are 1+ on the right side and 1+ on the left side.        Posterior tibial pulses are 1+ on the right side and 1+ on the left side.      Heart sounds: No murmur heard.     Comments: Paced rhythm  Pulmonary:      Comments: On mechanical support  Abdominal:      General: Bowel sounds are normal. There is distension.   Musculoskeletal:      Right lower leg: Edema present.      Left lower leg: Edema present.      Comments: +1 pitting edema, venous stasis   Skin:     Coloration: Skin is pale.       Comments: cold   Neurological:      Comments: Unable to assess as sedated          Significant Labs:   Recent Lab Results         04/06/25  0444   04/06/25  0426   04/06/25  0234        Base Deficit   -0.5         Performed By:   EDITH         Correct Temperature (PH)   7.350         Corrected Temperature (pCO2)   46.4         Corrected Temperature (pO2)   36.7         Specimen source   Venous         Albumin     2.8       ALP     139       ALT     28       Anion Gap     6       AST     46       Baso #     0.12       Basophil %     1.1       BILIRUBIN TOTAL     0.4  Comment: For infants and newborns, interpretation of results should be based   on gestational age, weight and in agreement with clinical   observations.    Premature Infant recommended reference ranges:   0-24 hours:  <8.0 mg/dL   24-48 hours: <12.0 mg/dL   3-5 days:    <15.0 mg/dL   6-29 days:   <15.0 mg/dL       BIPAP   0         BNP     437  Comment: Values of less than 100 pg/ml are consistent with non-CHF populations.        BUN     29       Calcium     >19.0       Chloride     111       CO2     19       Creatinine     2.1       eGFR     23  Comment: Estimated GFR calculated using the CKD-EPI creatinine (2021) equation.       Eos #     0.43       Eos %     4.1       FiO2   21.0         Free T4     0.78       Glucose     209       Gran # (ANC)     5.36       Hematocrit     40.7       Hemoglobin     12.4       Immature Grans (Abs)     0.05  Comment: Mild elevation in immature granulocytes is non specific and can be seen in a variety of conditions including stress response, acute inflammation, trauma and pregnancy. Correlation with other laboratory and clinical findings is essential.       Immature Granulocytes     0.5       Lymph #     3.61       Lymph %     34.3       Magnesium      2.2       MCH     28.2       MCHC     30.5       MCV     93       Mono #     0.97       Mono %     9.2       MPV     10.1       Neut %     50.8       nRBC     0        Platelet Count     224       POC Glucose 121           POC HCO3   23.3         POC Hematocrit   43.2         POC Lactate   1.8         POC PCO2   46.4         POC PH   7.350         POC PO2   36.7         POC Temp   37.0         Potassium     6.0  Comment: *No Visible Hemolysis       PROTEIN TOTAL     6.0       RBC     4.39       RDW     14.6       Sodium     136       Troponin I High Sensitivity     14       TSH     10.722       WBC     10.54                 Assessment and Plan:     * Shock  Ms. Mias Barajas is a 78 yo female with COPD on 2L home O2, HFpEF, AFL s/p RFA (6/17/24 w/ Dr. Church), CKD III, HTN, HLD, chronic venous insufficiency, orthostatic hypotension who was brought via EMS after per history she was found confused and bradycardiac with HR in the 20s.They started epi and transcutaneous pacing. They had to bag her in route. We do not have strip of underlying rhythm. Patient obtunded for which she was intubated on arrival to the ED and transcutaneous pacing was exchanged to transvenous pacing via RIJ. Underlying rate 44 bifascicular block. Known RBBB.    -Unclear etiology ddx include sepsis, mesenteric ischemia?  - elevated lactic acid. Awaiting CT h/c/a/p  - bradycardia most likely in the setting of electrolyte disturbance and abnormal thyroid function  - correcting electrolyte disturbance. Suspect most likely she will then not require tvp.   - obtaining bcx and ucx. Broad spectrum abx ppx and deescalating as needed.   - orosco placement    Subclinical hypothyroidism  TSH 10.722 with normal FT4. Consulting endo.     Iron deficiency anemia due to chronic blood loss  Anemia is likely due to chronic blood loss. Most recent hemoglobin and hematocrit are listed below.  Recent Labs     04/06/25  0234 04/06/25  0426   HGB 12.4  --    HCT 40.7 43.2     Plan  - Monitor serial CBC: Daily  - Transfuse PRBC if patient becomes hemodynamically unstable, symptomatic or H/H drops below 7/21.  - Patient has not  received any PRBC transfusions to date  - Patient's anemia is currently stable  - holding iron supplementation while intubated    Orthostatic hypotension  holding midodrine and per history patient had not required it since last hospitalization    Acute on chronic congestive heart failure   Most recent BNP and echo results are listed below.  Recent Labs     04/06/25  0234   *     Latest ECHO  Results for orders placed during the hospital encounter of 01/29/25    Echo    Interpretation Summary    Left Ventricle: The left ventricle is normal in size. Normal wall thickness. Normal wall motion. There is normal systolic function. Ejection fraction is approximately 55%.    Right Ventricle: Normal right ventricular cavity size. Wall thickness is normal. Systolic function is borderline low.    Left Atrium: Left atrium is severely dilated.    Mitral Valve: There is moderate posterior mitral annular calcification present.    Tricuspid Valve: There is mild regurgitation.    Pulmonary Artery: The estimated pulmonary artery systolic pressure is 51 mmHg.    IVC/SVC: Elevated venous pressure at 15 mmHg.    Current Heart Failure Medications: have been held due to bradycardia and MARQUEZ       Plan  - Monitor strict I&Os and daily weights.    - Place on telemetry  - repeating official echo, bedside echo appears to have normal LVEF, IVC cannot be assessed as patient intubated    MARQUEZ (acute kidney injury)  .BMP reviewed- noted Estimated Creatinine Clearance: 19.6 mL/min (A) (based on SCr of 2.1 mg/dL (H)). according to latest data. Based on current GFR, CKD stage is stage 4 - GFR 15-29.  Monitor UOP and serial BMP and adjust therapy as needed. Renally dose meds. Avoid nephrotoxic medications and procedures.  Recent Labs     04/06/25  0234   CREATININE 2.1*   EGFRNORACEVR 23*     - correcting electrolytes as needed. Unclear if hypercalcemia was secondary to medications but repeating levels       Chronic respiratory failure with  hypoxia  Patient with Hypoxic Respiratory failure which is Chronic.  she is on home oxygen at 2 LPM. Supplemental oxygen was provided and noted- Vent Mode: A/C  Oxygen Concentration (%):  [] 30  Resp Rate Total:  [18 br/min-19 br/min] 18 br/min  Vt Set:  [400 mL] 400 mL  PEEP/CPAP:  [5 cmH20] 5 cmH20  Mean Airway Pressure:  [7.8 cmH20] 7.8 cmH20    .   Signs/symptoms of respiratory failure include- tachypnea, increased work of breathing, use of accessory muscles, and lethargy. Contributing diagnoses includes - underlying COPD     Centrilobular emphysema  Prior history of smoking. Continue scheduled inhalers once extubated. Antibiotics and Supplemental oxygen and monitor respiratory status closely.     CKD (chronic kidney disease) stage 3, GFR 30-59 ml/min  Now MARQUEZ on CKD3. Nephrology consulted. Patient on bumex and potassium supplement as home which have been held    RB  Chronic         VTE Risk Mitigation (From admission, onward)           Ordered     heparin 25,000 units in dextrose 5% (100 units/ml) IV bolus from bag LOW INTENSITY nomogram - [facility]  As needed (PRN)        Question:  Heparin Infusion Adjustment (DO NOT MODIFY ANSWER)  Answer:  \\[email or web address]\Images\Pharmacy\HeparinInfusions\heparin LOW INTENSITY nomogram for [facility] [id number]    04/06/25 0520     heparin 25,000 units in dextrose 5% (100 units/ml) IV bolus from bag LOW INTENSITY nomogram - [facility]  As needed (PRN)        Question:  Heparin Infusion Adjustment (DO NOT MODIFY ANSWER)  Answer:  [email or web address]\Images\Pharmacy\HeparinInfusions\heparin LOW INTENSITY nomogram for [facility] [id number].pdf    04/06/25 0520     heparin 25,000 units in dextrose 5% (100 units/ml) IV bolus from bag LOW INTENSITY nomogram - [facility]  Once        Question:  Heparin Infusion Adjustment (DO NOT MODIFY ANSWER)  Answer:  [email or web address]\Images\Pharmacy\HeparinInfusions\heparin LOW INTENSITY nomogram for [facility] [id number].pdf    04/06/25 0520     heparin 25,000 units in dextrose  5% 250 mL (100 units/mL) infusion LOW INTENSITY nomogram - [facility]  Continuous        Question:  Begin at (units/kg/hr)  Answer:  12    04/06/25 0520     IP VTE HIGH RISK PATIENT  Once         04/06/25 0512     Place sequential compression device  Until discontinued         04/06/25 0454                    Cedrick Ford MD  Cardiology   [facility] - Emergency Dept

## 2025-04-06 NOTE — PLAN OF CARE
CICU DAILY GOALS   CVP 4/6/7  SVO2 79  Trending ABG (7.2 -> 7.4), lactic (2.3 / 3 / 3.3 / 2.8), trop (14 -> 133)  Hypothermic (93)   Start abx    A: Awake    RASS: Goal -    Actual - RASS (Manjarrez Agitation-Sedation Scale): drowsy   Restraint necessity: Clinical Justification: Removing medical devices, Treatment Interference  B: Breath   SBT: Not attempted   C: Coordinate A & B, analgesics/sedatives   Pain: managed    SAT: Not attempted  D: Delirium   CAM-ICU:    E: Early(intubated/ Progressive (non-intubated) Mobility   MOVE Screen: Fail   Activity: Activity Management: Patient unable to perform activities  FAS: Feeding/Nutrition   Diet order: Diet/Nutrition Received: NPO,   Fluid restriction:     Nutritional Supplement Intake: Quantity 0, Type:  0  T: Thrombus   DVT prophylaxis: VTE Core Measure: Pharmacological prophylaxis initiated/maintained  H: HOB Elevation   Head of Bed (HOB) Positioning: HOB at 30-45 degrees  U: Ulcer Prophylaxis   GI: yes  G: Glucose control   managed    S: Skin   Bathing/Skin Care: bath, complete;back care;incontinence care (04/06/25 0701)  Wounds: Yes  Wound care consulted: No  B: Bowel Function   no issues   I: Indwelling Catheters   Muniz necessity:      Urethral Catheter 04/06/25 0645-Reason for Continuing Urinary Catheterization: Critically ill in ICU and requiring hourly monitoring of intake/output   CVC necessity: Yes  D: De-escalation Antibx   No  Plan for the day   -  Family/Goals of care/Code Status   Code Status: Full Code     No acute events throughout day, VS and assessment per flow sheet, patient progressing towards goals as tolerated, plan of care reviewed with Misa Barajas and family, all concerns addressed, will continue to monitor.'

## 2025-04-06 NOTE — ASSESSMENT & PLAN NOTE
Ms. Misa Barajas is a 78 yo female with COPD on 2L home O2, HFpEF, AFL s/p RFA (6/17/24 w/ Dr. Church), CKD III, HTN, HLD, chronic venous insufficiency, orthostatic hypotension who was brought via EMS after per history she was found confused and bradycardiac with HR in the 20s.They started epi and transcutaneous pacing. They had to bag her in route. We do not have strip of underlying rhythm. Patient obtunded for which she was intubated on arrival to the ED and transcutaneous pacing was exchanged to transvenous pacing via RIJ. Underlying rate 44 bifascicular block. Known RBBB.    -Unclear etiology ddx include sepsis, mesenteric ischemia?  - elevated lactic acid. Awaiting CT h/c/a/p  - bradycardia most likely in the setting of electrolyte disturbance and abnormal thyroid function  - correcting electrolyte disturbance. Suspect most likely she will then not require tvp.   - obtaining bcx and ucx. Broad spectrum abx ppx and deescalating as needed.   - orosco placement

## 2025-04-06 NOTE — ED NOTES
Color change noted with clear bilateral breath sounds. +chest rise and fall.   He called and is interested in prostate surgery.  Recommend laser TURP.  He would like to get it done before the end of this year.  Will schedule him for laser TURP on 12.13.17.    Diagnoses and all orders for this visit:    Preop examination  -     EKG 12-lead; Future

## 2025-04-06 NOTE — CONSULTS
"[facility] / Clinical  Nephrology  CONSULT Note      Patient Name: Misa Barajas   MRN: [id number]   Current Provider: Honorio Ortiz MD  Primary Care Provider: Isela Langston MD   Admission Date: 4/6/2025   Hospital Day: 0  Bed: [id number]  Principal Problem: Complete heart block       SUBJECTIVE    Misa Barajas is a 79 y.o. female ,is admitted on 4/6/2025  with past medical history of   COPD on 2L home O2, HFpEF, AFL s/p RFA (6/17/24 w/ Dr. Church), CKD III, HTN, HLD, chronic venous insufficiency, orthostatic hypotension      BIBEMS    Admitted with CHF presents via EMS with bradycardia.  She has been nauseous the last few days. With EMS her heart rate was 20.  They started epi and transcutaneous pacing and also had to bag her in route. In ED she was intubated and transcutaneous pacing switched to transvenous.    Nephrology was consulted for MARQUEZ management. His baseline Cr is 1.2-1.4. Cr at consultation was 2.1.      Review of Systems: Negative except for as stated in history of present illness    OBJECTIVE     Vitals:  BP (!) 133/42 (BP Location: Left arm, Patient Position: Lying)   Pulse 81   Temp (!) 94.1 °F (34.5 °C) (Core Bladder)   Resp 20   Ht 5' 4" (1.626 m)   Wt 75 kg (165 lb 5.5 oz)   SpO2 99%   Breastfeeding No   BMI 28.38 kg/m²    I/O last 3 completed shifts:  In: 100 [IV Piggyback:100]  Out: -    Net IO Since Admission: 422.23 mL [04/06/25 1143]    Physical Examination:  Constitutional: Unconscious. On vent. Seems chronically ill. Intubated  Chest:  Upper respiratory sounds  Cardiovascular:  S1+S2+0.  Abdominal: SNT, ND  Extremities: Bilateral lower extremity edema     MEDICATIONS & LABS       0.9% NaCl   Intravenous Continuous 10 mL/hr at 04/06/25 1101 Rate Verify at 04/06/25 1101    dexmedeTOMIDine (Precedex) infusion (titrating)  0-1.4 mcg/kg/hr Intravenous Continuous 11.52 mL/hr at 04/06/25 1101 0.7 mcg/kg/hr at 04/06/25 1101    fentanyl  0-250 mcg/hr " Intravenous Continuous 10 mL/hr at 04/06/25 1101 100 mcg/hr at 04/06/25 1101    NORepinephrine bitartrate-D5W  0-3 mcg/kg/min Intravenous Continuous 2.5 mL/hr at 04/06/25 1101 0.01 mcg/kg/min at 04/06/25 1101    vasopressin  0.04 Units/min Intravenous Continuous   Stopped at 04/06/25 0903      heparin (porcine)  5,000 Units Subcutaneous Q8H    ketamine in 0.9 % sod chloride        [START ON 4/7/2025] levothyroxine  50 mcg Oral Before breakfast    pantoprazole  40 mg Intravenous Daily    piperacillin-tazobactam (Zosyn) IV (PEDS and ADULTS) (extended infusion is not appropriate)  4.5 g Intravenous Q12H    polyethylene glycol  17 g Per OG tube BID    senna  8.6 mg Per OG tube Daily    sodium chloride 0.9%  500 mL Intravenous Once     Current Outpatient Medications   Medication Instructions    acetaminophen (TYLENOL) 500 MG tablet     albuterol (VENTOLIN HFA) 90 mcg/actuation inhaler 1 puff    albuterol sulfate (PROAIR RESPICLICK) 90 mcg/actuation inhaler 2 puffs as needed Inhalation every 6 hrs 30 days    amoxicillin-clavulanate 500-125mg (AUGMENTIN) 500-125 mg Tab 1 tablet    amoxicillin-clavulanate 875-125mg (AUGMENTIN) 875-125 mg per tablet 1 tablet, 2 times daily    aspirin (ECOTRIN) 81 MG EC tablet 1 tablet Orally Once a day 30 days    BD POSIFLUSH NORMAL SALINE 0.9 injection     biotin 1 mg Cap 1 capsule Orally Once a day 30 days    budesonide-glycopyr-formoterol (BREZTRI AEROSPHERE) 160-9-4.8 mcg/actuation HFAA 2 puffs, Inhalation, 2 times daily    bumetanide (BUMEX) 1 mg, Oral, Daily, Okay to take bumex 1mg twice daily as needed for worsening shortness of breath, swelling or 3lb weight gain    busPIRone (BUSPAR) 5 MG Tab 1 tablet Orally once daily as needed 30 days    ciprofloxacin HCl (CIPRO) 500 mg    FeroSuL 325 mg, Oral, Every other day    fluticasone propionate (FLONASE) 50 mcg/actuation nasal spray 1 spray, Daily PRN    inhalation spacing device ([facility]) Use as directed for inhalation.     "levalbuterol (XOPENEX) 1.25 mg/3 mL nebulizer solution 1 ampule, Every 8 hours PRN    magnesium hydroxide 400 mg/5 ml (MILK OF MAGNESIA) 400 mg/5 mL Susp 30 ml as neded Orally once daily    meclizine (ANTIVERT) 12.5 mg, Oral, Daily    metoprolol succinate (TOPROL-XL) 25 MG 24 hr tablet Take ½ tablet (12.5 mg total) by mouth once daily.    midodrine (PROAMATINE) 2.5 mg, Oral, 3 times daily    ondansetron (ZOFRAN) 4 MG tablet 1 tablet as needed for nausea Orally every 8 hours 7 days    polyethylene glycol (MIRALAX) 17 gram/dose powder     potassium chloride (MICRO-K) 10 MEQ CpSR 20 mEq, Oral, Daily    potassium chloride SA (K-DUR,KLOR-CON M) 10 MEQ tablet 10 mEq    potassium chloride SA (K-DUR,KLOR-CON) 20 MEQ tablet 20 mEq    predniSONE (DELTASONE) 10 mg, 2 times daily    Saccharomyces boulardii (FLORASTOR) 250 mg capsule 1 capsule    senna-docusate 8.6-50 mg (PERICOLACE) 8.6-50 mg per tablet Daily    traZODone (DESYREL) 25 mg, Oral, Nightly PRN    triamcinolone acetonide 0.1% (KENALOG) 0.1 % cream Apply externally to affected area twice a day until clear    vitamin D (VITAMIN D3) 2,000 Units, Oral, Daily       Relevant Data:  Trend: No results found for: "CYSTATINCSER"  Trend:   Lab Results   Component Value Date    BUN 31 (H) 04/06/2025    BUN 29 (H) 04/06/2025    BUN 21 03/27/2025     Trend:     POC HCO3   Date Value Ref Range Status   04/06/2025 22.5 (L) 24 - 28 mmol/L Final   04/06/2025 21.8 (L) 24 - 28 mmol/L Final   04/06/2025 22.4 (L) 24 - 28 mmol/L Final     Trend:   Vitals:    04/06/25 0901 04/06/25 0913 04/06/25 1001 04/06/25 1101   BP: 118/60   (!) 133/42   BP Location:    Left arm   Patient Position:    Lying   Pulse: 82  81 81   Resp: 20  20 20   Temp:   (!) 93.6 °F (34.2 °C) (!) 94.1 °F (34.5 °C)   TempSrc:   Core Bladder Core Bladder   SpO2: 98%  99% 99%   Weight:  75 kg (165 lb 5.5 oz)     Height:  5' 4" (1.626 m)         I/O last 3 completed shifts:  In: 100 [IV Piggyback:100]  Out: -    Net IO " "Since Admission: 422.23 mL [04/06/25 1143]  Trend: No results found for: "PHOSPHORUS"  Trend:   Lab Results   Component Value Date    HGB 11.9 (L) 04/06/2025    HGB 12.4 04/06/2025    HGB 12.5 03/27/2025         Trend:   Calcium   Date Value Ref Range Status   04/06/2025 10.3 8.7 - 10.5 mg/dL Final   04/06/2025 >19.0 (HH) 8.7 - 10.5 mg/dL Final   03/27/2025 10.2 8.7 - 10.5 mg/dL Final     Albumin   Date Value Ref Range Status   04/06/2025 2.8 (L) 3.5 - 5.2 g/dL Final   03/27/2025 3.0 (L) 3.5 - 5.2 g/dL Final       MEDICAL HISTORY    Past Medical History:  Past Medical History:   Diagnosis Date    Acute biliary pancreatitis 07/27/2024    Anemia 07/12/2024    Aortic atherosclerosis     Arthritis     knee joint pain    Asthma-COPD overlap syndrome 08/22/2022    home o2    Breast cancer 2002    left breast & lymph nodes-s/p sx with chemo    Cataracts, bilateral     Chronic diastolic heart failure 12/24/2019    Chronic kidney disease, stage 3     Class 1 obesity due to excess calories with serious comorbidity and body mass index (BMI) of 30.0 to 30.9 in adult 05/16/2024    History of chemotherapy     last treatment 12/2002 (had 8 treatments)    Hyperlipidemia 11/20/2016    Hypertension     Lymphedema of both lower extremities 01/25/2022    Nephrolithiasis 06/02/2014    Osteoporosis     Paroxysmal atrial fibrillation 06/07/2021    Renal osteodystrophy 01/14/2016    Shock 4/6/2025    Subclinical hypothyroidism 4/6/2025    Venous stasis dermatitis of both lower extremities 01/25/2022    Vitamin D insufficiency        Past Surgical History:  Past Surgical History:   Procedure Laterality Date    ABLATION, ATRIAL FLUTTER, TYPICAL N/A 6/17/2024    Procedure: Ablation, Atrial Flutter, Typical;  Surgeon: Taz Church MD;  Location: [facility] EP LAB;  Service: Cardiology;  Laterality: N/A;  AFL, RFA, LORENE, MAKAYLA (cx if SR), anes, MB, 3 Prep    BREAST BIOPSY Left 2002    core bx, +    BREAST BIOPSY Right 2018    core    BREAST " BIOPSY Right 2019    BREAST LUMPECTOMY Left 2002    CYSTOSCOPY  4/28/2022    Procedure: CYSTOSCOPY;  Surgeon: Vik Ware MD;  Location: [facility] OR [facility] FLR;  Service: Urology;;    CYSTOSCOPY  5/30/2022    Procedure: CYSTOSCOPY;  Surgeon: Bonnie Knutson MD;  [facility];  Service: Urology;;    ECHOCARDIOGRAM,TRANSESOPHAGEAL N/A 6/17/2024    Procedure: Transesophageal echo (MAKAYLA) intra-procedure log documentation;  Surgeon: Nestor Martinez MD;  Location: [facility] EP LAB;  Service: Cardiology;  Laterality: N/A;    ERCP N/A 7/30/2024    Procedure: ERCP (ENDOSCOPIC RETROGRADE CHOLANGIOPANCREATOGRAPHY);  Surgeon: Sierra Cotto MD;  Location: [facility] ENDO (2ND FLR);  Service: Endoscopy;  Laterality: N/A;    LASER LITHOTRIPSY  5/30/2022    Procedure: LITHOTRIPSY, USING LASER;  Surgeon: Bonnie Knutson MD;  [facility];  Service: Urology;;    LITHOTRIPSY      MASTECTOMY Left 06/2002    left-& lymph node dissection    REPLACEMENT OF STENT Right 5/30/2022    Procedure: REPLACEMENT, STENT;  Surgeon: Bonnie Knutson MD;  [address]  Service: Urology;  Laterality: Right;    RETROGRADE PYELOGRAPHY Right 4/28/2022    Procedure: PYELOGRAM, RETROGRADE;  Surgeon: Vik Ware MD;  [facility];  Service: Urology;  Laterality: Right;    ROBOT-ASSISTED LAPAROSCOPIC PARTIAL NEPHRECTOMY USING DA JESÚS XI Right 3/11/2021    Procedure: XI ROBOTIC NEPHRECTOMY, PARTIAL;  Surgeon: Taz Ortega MD;  Location: [facility] OR 2ND FLR;  Service: Urology;  Laterality: Right;  4hr/ gen with regional  Counts include 234 beds at the [facility] confirmation:  [id number] for 11:15am case [place]    URETEROSCOPIC REMOVAL OF URETERIC CALCULUS Right 5/30/2022    Procedure: REMOVAL, CALCULUS, URETER, URETEROSCOPIC;  Surgeon: Bonnie Knutson MD;  [facility];  Service: Urology;  Laterality: Right;    ureteroscopy Bilateral     6.14    URETEROSCOPY Right 5/30/2022    Procedure: URETEROSCOPY;   Surgeon: Bonnie Knutson MD;  Location: [facility] OR [address] [place] [address];  Service: Urology;  Laterality: Right;       Family History:   Family History   Problem Relation Name Age of Onset    Bone cancer Mother Bibi     Breast cancer Mother Bibi     Arthritis Mother Bibi         Breast Cancer    Cancer Father Edaudi         Asbestos cancer    Arthritis Sister          Breast Cancer    No Known Problems Brother      Fibroids Daughter      Crohn's disease Daughter      No Known Problems Daughter      Anesthesia problems Neg Hx      Glaucoma Neg Hx        Review of patient's allergies indicates:   Allergen Reactions    Adhesive tape-silicones     Adhesive Rash     Pt states she removed a LATEX bandaid and the skin beneath was swollen and red. No other latex causes a reaction.     Social History[1]         ASSESSMENT AND PLAN:    Misa Barajas is a 79 y.o. female ,is admitted on 4/6/2025  with past medical history of    COPD on 2L home O2, HFpEF, AFL s/p RFA (6/17/24 w/ Dr. Church), CKD III, HTN, HLD, chronic venous insufficiency, orthostatic hypotension.    Admitted with bradycardia and hypotension, requiring pacing and pressors  Nephrology  is consulted for MARQUEZ on CKD    Impression:  MARQUEZ KDIGO stage 2 on CKD 3    Baseline Creatinine: 1.2-1.4  Creatinine at time of consult: 2.1  Tests done: UA neg  Etiology of MARQUEZ: Likely cause: pre-renal - hypotension, bradycardia    Hyperkalemia       Recommendations :   - Add UPCR, Renal US  - [facility] for high potassium   Monitor serum chemistries daily, strict intake and output Qshift , daily weights if able.  Renal protective measures: Please adjust medications for reduced clearance  Avoid nephrotoxic medications (NSAID, IV contrast)  Avoid ACEi and ARBs in the setting of MARQUEZ  Maintain MAP > 65  Transfuse for Hb <7    Thank you for allowing us to participate in the care of this patient.  Plan discussed with attending. Please call with any questions or concerns.           Noam  MD Cheyanne.  Clinical Nephrology Fellow, PGY-4  [facility]          [1]   Social History  Socioeconomic History    Marital status:    Tobacco Use    Smoking status: Former     Current packs/day: 0.00     Average packs/day: 1 pack/day for 50.0 years (50.0 ttl pk-yrs)     Types: Cigarettes     Start date: 1960     Quit date: 5/20/2009     Years since quitting: 15.8    Smokeless tobacco: Former   Substance and Sexual Activity    Alcohol use: No    Drug use: No    Sexual activity: Not Currently     Partners: Male     Birth control/protection: Partner-Vasectomy     Social Drivers of Health     Financial Resource Strain: Patient Unable To Answer (4/6/2025)    Overall Financial Resource Strain (CARDIA)     Difficulty of Paying Living Expenses: Patient unable to answer   Food Insecurity: Patient Unable To Answer (4/6/2025)    Hunger Vital Sign     Worried About Running Out of Food in the Last Year: Patient unable to answer     Ran Out of Food in the Last Year: Patient unable to answer   Transportation Needs: Patient Unable To Answer (4/6/2025)    PRAPARE - Transportation     Lack of Transportation (Medical): Patient unable to answer     Lack of Transportation (Non-Medical): Patient unable to answer   Physical Activity: Insufficiently Active (2/17/2025)    Exercise Vital Sign     Days of Exercise per Week: 2 days     Minutes of Exercise per Session: 30 min   Stress: Patient Unable To Answer (4/6/2025)    [place] [employer] - Occupational Stress Questionnaire     Feeling of Stress : Patient unable to answer   Housing Stability: Patient Unable To Answer (4/6/2025)    Housing Stability Vital Sign     Unable to Pay for Housing in the Last Year: Patient unable to answer     Homeless in the Last Year: Patient unable to answer

## 2025-04-06 NOTE — ASSESSMENT & PLAN NOTE
.BMP reviewed- noted Estimated Creatinine Clearance: 19.6 mL/min (A) (based on SCr of 2.1 mg/dL (H)). according to latest data. Based on current GFR, CKD stage is stage 4 - GFR 15-29.  Monitor UOP and serial BMP and adjust therapy as needed. Renally dose meds. Avoid nephrotoxic medications and procedures.  Recent Labs     04/06/25  0234   CREATININE 2.1*   EGFRNORACEVR 23*     - correcting electrolytes as needed. Unclear if hypercalcemia was secondary to medications but repeating levels

## 2025-04-06 NOTE — SUBJECTIVE & OBJECTIVE
Past Medical History:   Diagnosis Date    Acute biliary pancreatitis 07/27/2024    Anemia 07/12/2024    Aortic atherosclerosis     Arthritis     knee joint pain    Asthma-COPD overlap syndrome 08/22/2022    home o2    Breast cancer 2002    left breast & lymph nodes-s/p sx with chemo    Cataracts, bilateral     Chronic diastolic heart failure 12/24/2019    Chronic kidney disease, stage 3     Class 1 obesity due to excess calories with serious comorbidity and body mass index (BMI) of 30.0 to 30.9 in adult 05/16/2024    History of chemotherapy     last treatment 12/2002 (had 8 treatments)    Hyperlipidemia 11/20/2016    Hypertension     Lymphedema of both lower extremities 01/25/2022    Nephrolithiasis 06/02/2014    Osteoporosis     Paroxysmal atrial fibrillation 06/07/2021    Renal osteodystrophy 01/14/2016    Venous stasis dermatitis of both lower extremities 01/25/2022    Vitamin D insufficiency        Past Surgical History:   Procedure Laterality Date    ABLATION, ATRIAL FLUTTER, TYPICAL N/A 6/17/2024    Procedure: Ablation, Atrial Flutter, Typical;  Surgeon: Taz Church MD;  Location: [facility] EP LAB;  Service: Cardiology;  Laterality: N/A;  AFL, RFA, LORENE, MAKAYLA (cx if SR), anes, MB, 3 Prep    BREAST BIOPSY Left 2002    core bx, +    BREAST BIOPSY Right 2018    core    BREAST BIOPSY Right 2019    BREAST LUMPECTOMY Left 2002    CYSTOSCOPY  4/28/2022    Procedure: CYSTOSCOPY;  Surgeon: Vik Ware MD;  Location: [facility] OR [address] [place] [address];  Service: Urology;;    CYSTOSCOPY  5/30/2022    Procedure: CYSTOSCOPY;  Surgeon: Bonnie Knutson MD;  Location: [facility] OR [address] [place] [address];  Service: Urology;;    ECHOCARDIOGRAM,TRANSESOPHAGEAL N/A 6/17/2024    Procedure: Transesophageal echo (MAKAYLA) intra-procedure log documentation;  Surgeon: Nestor Martinez MD;  Location: [facility] EP LAB;  Service: Cardiology;  Laterality: N/A;    ERCP N/A 7/30/2024    Procedure: ERCP (ENDOSCOPIC RETROGRADE CHOLANGIOPANCREATOGRAPHY);  Surgeon: Yadiel  Sierra MARQUEZ MD;  Location: [facility] ENDO (2ND FLR);  Service: Endoscopy;  Laterality: N/A;    LASER LITHOTRIPSY  5/30/2022    Procedure: LITHOTRIPSY, USING LASER;  Surgeon: Bonnie Knutson MD;  [facility];  Service: Urology;;    LITHOTRIPSY      MASTECTOMY Left 06/2002    left-& lymph node dissection    REPLACEMENT OF STENT Right 5/30/2022    Procedure: REPLACEMENT, STENT;  Surgeon: Bonnie Knutson MD;  [facility];  Service: Urology;  Laterality: Right;    RETROGRADE PYELOGRAPHY Right 4/28/2022    Procedure: PYELOGRAM, RETROGRADE;  Surgeon: Vik Ware MD;  [facility];  Service: Urology;  Laterality: Right;    ROBOT-ASSISTED LAPAROSCOPIC PARTIAL NEPHRECTOMY USING DA JESÚS XI Right 3/11/2021    Procedure: XI ROBOTIC NEPHRECTOMY, PARTIAL;  Surgeon: Taz Ortega MD;  [facility];  Service: Urology;  Laterality: Right;  4hr/ gen with regional  [facility] confirmation:  [id number] for 11:15am case NC    URETEROSCOPIC REMOVAL OF URETERIC CALCULUS Right 5/30/2022    Procedure: REMOVAL, CALCULUS, URETER, URETEROSCOPIC;  Surgeon: Bonnie Knutson MD;  Location: [facility] OR [address] [place] [address];  Service: Urology;  Laterality: Right;    ureteroscopy Bilateral     6.14    URETEROSCOPY Right 5/30/2022    Procedure: URETEROSCOPY;  Surgeon: Bonnie Knutson MD;  [address];  Service: Urology;  Laterality: Right;       Review of patient's allergies indicates:   Allergen Reactions    Adhesive tape-silicones     Adhesive Rash     Pt states she removed a LATEX bandaid and the skin beneath was swollen and red. No other latex causes a reaction.       No current facility-administered medications on file prior to encounter.     Current Outpatient Medications on File Prior to Encounter   Medication Sig    acetaminophen (TYLENOL) 500 MG tablet     albuterol (VENTOLIN HFA) 90 mcg/actuation inhaler Inhale 1 puff into the lungs.    albuterol sulfate (PROAIR  RESPICLICK) 90 mcg/actuation inhaler 2 puffs as needed Inhalation every 6 hrs 30 days    amoxicillin-clavulanate 500-125mg (AUGMENTIN) 500-125 mg Tab Take 1 tablet by mouth.    amoxicillin-clavulanate 875-125mg (AUGMENTIN) 875-125 mg per tablet Take 1 tablet by mouth 2 (two) times daily.    aspirin (ECOTRIN) 81 MG EC tablet 1 tablet Orally Once a day 30 days    BD POSIFLUSH NORMAL SALINE 0.9 injection     biotin 1 mg Cap 1 capsule Orally Once a day 30 days    budesonide-glycopyr-formoterol (BREZTRI AEROSPHERE) 160-9-4.8 mcg/actuation HFAA Inhale 2 puffs into the lungs 2 (two) times daily.    bumetanide (BUMEX) 1 MG tablet Take 1 tablet (1 mg total) by mouth once daily. Okay to take bumex 1mg twice daily as needed for worsening shortness of breath, swelling or 3lb weight gain    busPIRone (BUSPAR) 5 MG Tab 1 tablet Orally once daily as needed 30 days    ciprofloxacin HCl (CIPRO) 500 MG tablet Take 500 mg by mouth.    ferrous sulfate (FEOSOL) 325 mg (65 mg iron) Tab tablet Take 1 tablet (325 mg total) by mouth every other day.    fluticasone propionate (FLONASE) 50 mcg/actuation nasal spray 1 spray by Each Nostril route daily as needed for Rhinitis or Allergies.    inhalation spacing device ([facility]) Use as directed for inhalation.    levalbuterol (XOPENEX) 1.25 mg/3 mL nebulizer solution Take 1 ampule by nebulization every 8 (eight) hours as needed for Wheezing or Shortness of Breath.    magnesium hydroxide 400 mg/5 ml (MILK OF MAGNESIA) 400 mg/5 mL Susp 30 ml as neded Orally once daily    meclizine (ANTIVERT) 12.5 mg tablet Take 1 tablet (12.5 mg total) by mouth Daily.    metoprolol succinate (TOPROL-XL) 25 MG 24 hr tablet Take ½ tablet (12.5 mg total) by mouth once daily.    midodrine (PROAMATINE) 2.5 MG Tab Take 1 tablet (2.5 mg total) by mouth 3 (three) times daily.    ondansetron (ZOFRAN) 4 MG tablet 1 tablet as needed for nausea Orally every 8 hours 7 days    polyethylene glycol (MIRALAX) 17  gram/dose powder     potassium chloride (MICRO-K) 10 MEQ CpSR Take 2 capsules (20 mEq total) by mouth once daily.    potassium chloride SA (K-DUR,KLOR-CON M) 10 MEQ tablet Take 10 mEq by mouth.    potassium chloride SA (K-DUR,KLOR-CON) 20 MEQ tablet Take 20 mEq by mouth.    predniSONE (DELTASONE) 10 MG tablet Take 10 mg by mouth 2 (two) times daily.    Saccharomyces boulardii (FLORASTOR) 250 mg capsule Take 1 capsule by mouth.    senna-docusate 8.6-50 mg (PERICOLACE) 8.6-50 mg per tablet Take by mouth once daily.    traZODone (DESYREL) 50 MG tablet Take 0.5 tablets (25 mg total) by mouth nightly as needed for Insomnia (Anxiety or insomnia).    triamcinolone acetonide 0.1% (KENALOG) 0.1 % cream Apply externally to affected area twice a day until clear    vitamin D (VITAMIN D3) 1000 units Tab Take 2 tablets (2,000 Units total) by mouth once daily.     Family History       Problem Relation (Age of Onset)    Arthritis Mother, Sister    Bone cancer Mother    Breast cancer Mother    Cancer Father    Crohn's disease Daughter    Fibroids Daughter    No Known Problems Brother, Daughter          Tobacco Use    Smoking status: Former     Current packs/day: 0.00     Average packs/day: 1 pack/day for 50.0 years (50.0 ttl pk-yrs)     Types: Cigarettes     Start date: 1960     Quit date: 5/20/2009     Years since quitting: 15.8    Smokeless tobacco: Former   Substance and Sexual Activity    Alcohol use: No    Drug use: No    Sexual activity: Not Currently     Partners: Male     Birth control/protection: Partner-Vasectomy     Review of Systems   Unable to perform ROS: Intubated     Objective:     Vital Signs (Most Recent):  Temp: (!) 95.5 °F (35.3 °C) (04/06/25 0502)  Pulse: 62 (04/06/25 0502)  Resp: 20 (04/06/25 0502)  BP: (!) 139/99 (04/06/25 0435)  SpO2: 100 % (04/06/25 0502) Vital Signs (24h Range):  Temp:  [95.5 °F (35.3 °C)-95.7 °F (35.4 °C)] 95.5 °F (35.3 °C)  Pulse:  [46-92] 62  Resp:  [18-42] 20  SpO2:  [97 %-100 %] 100  %  BP: (108-218)/(51-99) 139/99     Weight: 65.8 kg (145 lb)  Body mass index is 24.89 kg/m².    SpO2: 100 %       No intake or output data in the 24 hours ending 04/06/25 0513    Lines/Drains/Airways       Drain  Duration                  NG/OG Tube 04/06/25 0349 Left mouth <1 day              Airway  Duration                  Airway - Non-Surgical 04/06/25 0228 Endotracheal Tube <1 day              Line  Duration                  Pacer Wires 04/06/25 0256 <1 day              Peripheral Intravenous Line  Duration                  Peripheral IV - Single Lumen 04/06/25 0216 18 G Posterior;Right Forearm <1 day         Peripheral IV - Single Lumen 04/06/25 0426 18 G Anterior;Right Hand <1 day         Peripheral IV - Single Lumen 04/06/25 20 G Left Antecubital <1 day                     Physical Exam  Vitals and nursing note reviewed.   Constitutional:       Appearance: She is ill-appearing.      Comments: Intubated and sedated   HENT:      Head: Normocephalic and atraumatic.      Mouth/Throat:      Mouth: Mucous membranes are moist.   Eyes:      General: No scleral icterus.  Cardiovascular:      Pulses:           Carotid pulses are 2+ on the right side and 2+ on the left side.       Radial pulses are 2+ on the right side and 2+ on the left side.        Dorsalis pedis pulses are 1+ on the right side and 1+ on the left side.        Posterior tibial pulses are 1+ on the right side and 1+ on the left side.      Heart sounds: No murmur heard.     Comments: Paced rhythm  Pulmonary:      Comments: On mechanical support  Abdominal:      General: Bowel sounds are normal. There is distension.   Musculoskeletal:      Right lower leg: Edema present.      Left lower leg: Edema present.      Comments: +1 pitting edema, venous stasis   Skin:     Coloration: Skin is pale.      Comments: cold   Neurological:      Comments: Unable to assess as sedated          Significant Labs:   Recent Lab Results         04/06/25  0320    04/06/25  0426   04/06/25  0234        Base Deficit   -0.5         Performed By:   EDITH         Correct Temperature (PH)   7.350         Corrected Temperature (pCO2)   46.4         Corrected Temperature (pO2)   36.7         Specimen source   Venous         Albumin     2.8       ALP     139       ALT     28       Anion Gap     6       AST     46       Baso #     0.12       Basophil %     1.1       BILIRUBIN TOTAL     0.4  Comment: For infants and newborns, interpretation of results should be based   on gestational age, weight and in agreement with clinical   observations.    Premature Infant recommended reference ranges:   0-24 hours:  <8.0 mg/dL   24-48 hours: <12.0 mg/dL   3-5 days:    <15.0 mg/dL   6-29 days:   <15.0 mg/dL       BIPAP   0         BNP     437  Comment: Values of less than 100 pg/ml are consistent with non-CHF populations.        BUN     29       Calcium     >19.0       Chloride     111       CO2     19       Creatinine     2.1       eGFR     23  Comment: Estimated GFR calculated using the CKD-EPI creatinine (2021) equation.       Eos #     0.43       Eos %     4.1       FiO2   21.0         Free T4     0.78       Glucose     209       Gran # (ANC)     5.36       Hematocrit     40.7       Hemoglobin     12.4       Immature Grans (Abs)     0.05  Comment: Mild elevation in immature granulocytes is non specific and can be seen in a variety of conditions including stress response, acute inflammation, trauma and pregnancy. Correlation with other laboratory and clinical findings is essential.       Immature Granulocytes     0.5       Lymph #     3.61       Lymph %     34.3       Magnesium      2.2       MCH     28.2       MCHC     30.5       MCV     93       Mono #     0.97       Mono %     9.2       MPV     10.1       Neut %     50.8       nRBC     0       Platelet Count     224       POC Glucose 121           POC HCO3   23.3         POC Hematocrit   43.2         POC Lactate   1.8         POC PCO2    46.4         POC PH   7.350         POC PO2   36.7         POC Temp   37.0         Potassium     6.0  Comment: *No Visible Hemolysis       PROTEIN TOTAL     6.0       RBC     4.39       RDW     14.6       Sodium     136       Troponin I High Sensitivity     14       TSH     10.722       WBC     10.54

## 2025-04-06 NOTE — CONSULTS
[facility]  Cardiac Electrophysiology  Consult Note    Admission Date: 4/6/2025  Code Status: Full Code   Attending Provider: Honorio Ortiz MD  Consulting Provider: Yajaira Menjivar MD  Principal Problem:Complete heart block    Inpatient consult to Electrophysiology  Consult performed by: Yajaira Menjivar MD  Consult ordered by: Yajaira Menjivar MD  Reason for consult: CHB        Subjective:     Chief Complaint:  CHB    HPI:   Ms. Misa Barajas is a 78 yo female with COPD on 2L home O2, HFpEF, AFL s/p RFA (6/17/24 w/ Dr. Church), HFpEF, CKD III, HTN, HLD, chronic venous insufficiency, previous hx of orthostatic hypotension who was brought via [place] [facility] [place] after daughter noted patient to be confused on the phone. As per daughter, patient was perfectly fine until right around midnight when she called daughter stating she wasn't feeling feel. EMS was called and upon arrivial patient was noted to be in CHB with Hrs in the 20s. They started epi and transcutaneous pacing. Given Versed for the transcutaneous pacing. They had to bag her in route. Upon arrival to ED, patient was noted to be AMS and was intubated for airway protection. TVP was placed and patient was admitted to the CCU for further care. EP consulted for evaluation of complete heart block.       after per history she was found confused and bradycardiac with HR in the 20s.They started epi and transcutaneous pacing. Given Versed for the transcutaneous pacing. They had to bag her in route. We do not have strip of underlying rhythm. Patient obtunded for which she was intubated for airway protection on arrival to the ED and transcutaneous pacing was exchanged to transvenous pacing via RIJ (threshold 0.6). Underlying rate 44 bifascicular block. Known RBBB. Per daughters patient had called them over the phone when they noticed she appeared confused. Due to concerns for a possible syncopal episode EMS was called. Prior to event  patient had been complaining of malaise and nausea for which she took about two Zofran pills per the daughters.      She had been recently seen on 3/27/25 at the [facility] visit following recent admission for AHRF 2/2 ADHF and COPD exacerbation. On ED presentation BNP elevated to 2500, troponin elevated to 19, CXR with pulmonary edema. She was started on steroids, nebs and antibiotics in addition to IV diuresis. Updated TTE stable from prior, showing HFpEF with elevated CVP 15. Her O2 requirements improved to baseline with diuresis and she was subsequently discharged. At that visit patient had not required midodrine as blood pressure had remained stable and she was asymptomatic.      Off note, patient was taken off OAC due to bleeding issues and no recurrent of typical atrial flutter after ablation.     Past Medical History:   Diagnosis Date    Acute biliary pancreatitis 07/27/2024    Anemia 07/12/2024    Aortic atherosclerosis     Arthritis     knee joint pain    Asthma-COPD overlap syndrome 08/22/2022    home o2    Breast cancer 2002    left breast & lymph nodes-s/p sx with chemo    Cataracts, bilateral     Chronic diastolic heart failure 12/24/2019    Chronic kidney disease, stage 3     Class 1 obesity due to excess calories with serious comorbidity and body mass index (BMI) of 30.0 to 30.9 in adult 05/16/2024    History of chemotherapy     last treatment 12/2002 (had 8 treatments)    Hyperlipidemia 11/20/2016    Hypertension     Lymphedema of both lower extremities 01/25/2022    Nephrolithiasis 06/02/2014    Osteoporosis     Paroxysmal atrial fibrillation 06/07/2021    Renal osteodystrophy 01/14/2016    Shock 4/6/2025    Subclinical hypothyroidism 4/6/2025    Venous stasis dermatitis of both lower extremities 01/25/2022    Vitamin D insufficiency        Past Surgical History:   Procedure Laterality Date    ABLATION, ATRIAL FLUTTER, TYPICAL N/A 6/17/2024    Procedure: Ablation, Atrial Flutter, Typical;  Surgeon:  Taz Church MD;  Location: [facility] EP LAB;  Service: Cardiology;  Laterality: N/A;  AFL, RFA, LORENE, MAKAYLA (cx if SR), LEANNE miller, 3 Prep    BREAST BIOPSY Left 2002    core bx, +    BREAST BIOPSY Right 2018    core    BREAST BIOPSY Right 2019    BREAST LUMPECTOMY Left 2002    CYSTOSCOPY  4/28/2022    Procedure: CYSTOSCOPY;  Surgeon: Vik Ware MD;  Location: [facility] OR [facility] FLR;  Service: Urology;;    CYSTOSCOPY  5/30/2022    Procedure: CYSTOSCOPY;  Surgeon: Bonnie Knutson MD;  [facility];  Service: Urology;;    ECHOCARDIOGRAM,TRANSESOPHAGEAL N/A 6/17/2024    Procedure: Transesophageal echo (MAKAYLA) intra-procedure log documentation;  Surgeon: Nestor Martinez MD;  Location: [facility] EP LAB;  Service: Cardiology;  Laterality: N/A;    ERCP N/A 7/30/2024    Procedure: ERCP (ENDOSCOPIC RETROGRADE CHOLANGIOPANCREATOGRAPHY);  Surgeon: Sierra Cotto MD;  Location: [facility] ENDO (2ND FLR);  Service: Endoscopy;  Laterality: N/A;    LASER LITHOTRIPSY  5/30/2022    Procedure: LITHOTRIPSY, USING LASER;  Surgeon: Bonnie Knutson MD;  [facility];  Service: Urology;;    LITHOTRIPSY      MASTECTOMY Left 06/2002    left-& lymph node dissection    REPLACEMENT OF STENT Right 5/30/2022    Procedure: REPLACEMENT, STENT;  Surgeon: Bonnie Knutson MD;  Location: [facility] OR 1ST FLR;  Service: Urology;  Laterality: Right;    RETROGRADE PYELOGRAPHY Right 4/28/2022    Procedure: PYELOGRAM, RETROGRADE;  Surgeon: Vik Ware MD;  Location: [facility] OR 1ST FLR;  Service: Urology;  Laterality: Right;    ROBOT-ASSISTED LAPAROSCOPIC PARTIAL NEPHRECTOMY USING DA JESÚS XI Right 3/11/2021    Procedure: XI ROBOTIC NEPHRECTOMY, PARTIAL;  Surgeon: Taz Ortega MD;  Location: [facility] OR 2ND FLR;  Service: Urology;  Laterality: Right;  4hr/ gen with regional  [place] confirmation:  [id number] for 11:15am case NC    URETEROSCOPIC REMOVAL OF URETERIC CALCULUS Right 5/30/2022    Procedure: REMOVAL, CALCULUS,  URETER, URETEROSCOPIC;  Surgeon: Bonnie Knutson MD;  Location: [facility] OR [address] [place] [address];  Service: Urology;  Laterality: Right;    ureteroscopy Bilateral     6.14    URETEROSCOPY Right 5/30/2022    Procedure: URETEROSCOPY;  Surgeon: Bonnie Knutson MD;  Location: [facility] OR [address] [place] [address];  Service: Urology;  Laterality: Right;       Review of patient's allergies indicates:   Allergen Reactions    Adhesive tape-silicones     Adhesive Rash     Pt states she removed a LATEX bandaid and the skin beneath was swollen and red. No other latex causes a reaction.       No current facility-administered medications on file prior to encounter.     Current Outpatient Medications on File Prior to Encounter   Medication Sig    acetaminophen (TYLENOL) 500 MG tablet     albuterol (VENTOLIN HFA) 90 mcg/actuation inhaler Inhale 1 puff into the lungs.    albuterol sulfate (PROAIR RESPICLICK) 90 mcg/actuation inhaler 2 puffs as needed Inhalation every 6 hrs 30 days    amoxicillin-clavulanate 500-125mg (AUGMENTIN) 500-125 mg Tab Take 1 tablet by mouth.    amoxicillin-clavulanate 875-125mg (AUGMENTIN) 875-125 mg per tablet Take 1 tablet by mouth 2 (two) times daily.    aspirin (ECOTRIN) 81 MG EC tablet 1 tablet Orally Once a day 30 days    BD POSIFLUSH NORMAL SALINE 0.9 injection     biotin 1 mg Cap 1 capsule Orally Once a day 30 days    budesonide-glycopyr-formoterol (BREZTRI AEROSPHERE) 160-9-4.8 mcg/actuation HFAA Inhale 2 puffs into the lungs 2 (two) times daily.    bumetanide (BUMEX) 1 MG tablet Take 1 tablet (1 mg total) by mouth once daily. Okay to take bumex 1mg twice daily as needed for worsening shortness of breath, swelling or 3lb weight gain    busPIRone (BUSPAR) 5 MG Tab 1 tablet Orally once daily as needed 30 days    ciprofloxacin HCl (CIPRO) 500 MG tablet Take 500 mg by mouth.    ferrous sulfate (FEOSOL) 325 mg (65 mg iron) Tab tablet Take 1 tablet (325 mg total) by mouth every other day.    fluticasone propionate (FLONASE) 50  mcg/actuation nasal spray 1 spray by Each Nostril route daily as needed for Rhinitis or Allergies.    inhalation spacing device ([facility]) Use as directed for inhalation.    levalbuterol (XOPENEX) 1.25 mg/3 mL nebulizer solution Take 1 ampule by nebulization every 8 (eight) hours as needed for Wheezing or Shortness of Breath.    magnesium hydroxide 400 mg/5 ml (MILK OF MAGNESIA) 400 mg/5 mL Susp 30 ml as neded Orally once daily    meclizine (ANTIVERT) 12.5 mg tablet Take 1 tablet (12.5 mg total) by mouth Daily.    metoprolol succinate (TOPROL-XL) 25 MG 24 hr tablet Take ½ tablet (12.5 mg total) by mouth once daily.    midodrine (PROAMATINE) 2.5 MG Tab Take 1 tablet (2.5 mg total) by mouth 3 (three) times daily.    ondansetron (ZOFRAN) 4 MG tablet 1 tablet as needed for nausea Orally every 8 hours 7 days    polyethylene glycol (MIRALAX) 17 gram/dose powder     potassium chloride (MICRO-K) 10 MEQ CpSR Take 2 capsules (20 mEq total) by mouth once daily.    potassium chloride SA (K-DUR,KLOR-CON M) 10 MEQ tablet Take 10 mEq by mouth.    potassium chloride SA (K-DUR,KLOR-CON) 20 MEQ tablet Take 20 mEq by mouth.    predniSONE (DELTASONE) 10 MG tablet Take 10 mg by mouth 2 (two) times daily.    Saccharomyces boulardii (FLORASTOR) 250 mg capsule Take 1 capsule by mouth.    senna-docusate 8.6-50 mg (PERICOLACE) 8.6-50 mg per tablet Take by mouth once daily.    traZODone (DESYREL) 50 MG tablet Take 0.5 tablets (25 mg total) by mouth nightly as needed for Insomnia (Anxiety or insomnia).    triamcinolone acetonide 0.1% (KENALOG) 0.1 % cream Apply externally to affected area twice a day until clear    vitamin D (VITAMIN D3) 1000 units Tab Take 2 tablets (2,000 Units total) by mouth once daily.     Family History       Problem Relation (Age of Onset)    Arthritis Mother, Sister    Bone cancer Mother    Breast cancer Mother    Cancer Father    Crohn's disease Daughter    Fibroids Daughter    No Known Problems  Brother, Daughter          Tobacco Use    Smoking status: Former     Current packs/day: 0.00     Average packs/day: 1 pack/day for 50.0 years (50.0 ttl pk-yrs)     Types: Cigarettes     Start date: 1960     Quit date: 5/20/2009     Years since quitting: 15.8    Smokeless tobacco: Former   Substance and Sexual Activity    Alcohol use: No    Drug use: No    Sexual activity: Not Currently     Partners: Male     Birth control/protection: Partner-Vasectomy     ROSUnable to obtain due to sedation.   Objective:     Vital Signs (Most Recent):  Temp: (!) 93.6 °F (34.2 °C) (04/06/25 1001)  Pulse: 81 (04/06/25 1001)  Resp: 20 (04/06/25 1001)  BP: 118/60 (04/06/25 0901)  SpO2: 99 % (04/06/25 1001) Vital Signs (24h Range):  Temp:  [93.6 °F (34.2 °C)-96.8 °F (36 °C)] 93.6 °F (34.2 °C)  Pulse:  [] 81  Resp:  [17-45] 20  SpO2:  [96 %-100 %] 99 %  BP: ()/(31-99) 118/60  Arterial Line BP: (125-139)/(36-43) 139/43       Weight: 75 kg (165 lb 5.5 oz)  Body mass index is 28.38 kg/m².    SpO2: 99 %        Physical Exam   Constitutional:       Appearance: She is ill-appearing.      Comments: Intubated and sedated   HENT:      Head: Normocephalic and atraumatic.      Mouth/Throat:      Mouth: Mucous membranes are moist.   Eyes:      General: No scleral icterus.  Cardiovascular:      Pulses:           Carotid pulses are 2+ on the right side and 2+ on the left side.       Radial pulses are 2+ on the right side and 2+ on the left side.        Dorsalis pedis pulses are 1+ on the right side and 1+ on the left side.        Posterior tibial pulses are 1+ on the right side and 1+ on the left side.      Heart sounds: No murmur heard.     Comments: Paced rhythm  Pulmonary:      Comments: On mechanical support  Abdominal:      General: Bowel sounds are normal. There is distension.   Musculoskeletal:      Right lower leg: Edema present.      Left lower leg: Edema present.      Comments: +1 pitting edema, venous stasis   Skin:      Coloration: Skin is pale.      Comments: cold   Neurological:      Comments: Unable to assess as sedated     Significant Labs: All pertinent lab results from the last 24 hours have been reviewed.    Significant Imaging: Echocardiogram: Transthoracic echo (TTE) complete (Cupid Only):   Results for orders placed or performed during the hospital encounter of 01/29/25   Echo   Result Value Ref Range    LV Diastolic Volume 81.08 mL    Echo EF Estimated 52 %    LV Systolic Volume 38.79 mL    IVS 0.9 0.6 - 1.1 cm    LVIDd 4.3 3.5 - 6.0 cm    LVIDs 3.1 2.1 - 4.0 cm    LVOT diameter 1.9 cm    PW 0.9 0.6 - 1.1 cm    IVRT 63 ms    AV LVOT peak gradient 5 mmHg    LVOT mn grad 3 mmHg    LVOT peak norberto 1.2 m/s    LVOT peak VTI 24.1 cm    RV- razo basal diam 3.9 cm    RV S' 10.72 cm/s    LA size 2.8 cm    Left Atrium Major Axis 5.0 cm    Left Atrium Minor Axis 5.0 cm    LA Vol (MOD) 98 mL    RA Major Axis 4.84 cm    RA Area 19.2 cm2    AV valve area 2.4 cm2    AV area by cont VTI 2.4 cm2    AV peak gradient 9 mmHg    AV mean gradient 4 mmHg    Ao peak norberto 1.5 m/s    Ao VTI 28.6 cm    MV Peak E Norberto 1.37 m/s    TDI LATERAL 0.07 m/s    TDI SEPTAL 0.07 m/s    MV peak gradient 4 mmHg    TV peak gradient 36 mmHg    TR Max Norberto 3.0 m/s    Ascending aorta 2.86 cm    STJ 2.51 cm    IVC diameter 2.70 cm    Sinus 3.24 cm    LA WIDTH 4.3 cm    RA Width 3.27 cm    TAPSE 2.23 cm    BSA 1.7 m2    LVOT stroke volume 68.3 cm3    LV Systolic Volume Index 23.1 mL/m2    LV Diastolic Volume Index 48.26 mL/m2    LVOT area 2.8 cm2    FS 27.9 (A) 28 - 44 %    Left Ventricle Relative Wall Thickness 0.42 cm    LV mass 123.3 g    LV Mass Index 73.4 g/m2    E/E' ratio 20 m/s    LV SEPTAL E/E' RATIO 19.6 m/s    TOMÁS 30 mL/m2    LV LATERAL E/E' RATIO 19.6 m/s    LA Vol 51 cm3    RV/LV Ratio 0.91 cm    TOMÁS (MOD) 58 mL/m2    AV Velocity Ratio 0.80     AV index (prosthetic) 0.84     KHOI by Velocity Ratio 2.3 cm²    Triscuspid Valve Regurgitation Peak Gradient 36  mmHg    Mean e' 0.07 m/s    ZLVIDS 0.52     ZLVIDD -0.86     TV resting pulmonary artery pressure 51 mmHg    RV TB RVSP 18 mmHg    Est. RA pres 15 mmHg    EF 55 %    Narrative      Left Ventricle: The left ventricle is normal in size. Normal wall   thickness. Normal wall motion. There is normal systolic function. Ejection   fraction is approximately 55%.    Right Ventricle: Normal right ventricular cavity size. Wall thickness   is normal. Systolic function is borderline low.    Left Atrium: Left atrium is severely dilated.    Mitral Valve: There is moderate posterior mitral annular calcification   present.    Tricuspid Valve: There is mild regurgitation.    Pulmonary Artery: The estimated pulmonary artery systolic pressure is   51 mmHg.    IVC/SVC: Elevated venous pressure at 15 mmHg.                 Assessment and Plan:     * Complete heart block  Ms. Misa Barajas is a 80 yo female with COPD on 2L home O2, HFpEF, AFL s/p RFA (6/17/24 w/ Dr. Church), CKD III, HTN, HLD, chronic venous insufficiency, orthostatic hypotension who was admitted to the hospital for complete heart block and shock. EP consulted for pacemaker evaluation.     Recommendation:   - Patient noted to intermittently be NSR. Continue monitoring with TVP. Threshold 0.6.   - Will likely require a pacemaker but will need acute hemodynamic instability to resolve.   - Hold any AV melissa blocking agents.   - For full recommendations, please see staff attestation.         Thank you for your consult. I will follow-up with patient. Please contact us if you have any additional questions.    Yajaira Menjivar MD  Cardiac Electrophysiology  [facility]

## 2025-04-06 NOTE — SUBJECTIVE & OBJECTIVE
Past Medical History:   Diagnosis Date    Acute biliary pancreatitis 07/27/2024    Anemia 07/12/2024    Aortic atherosclerosis     Arthritis     knee joint pain    Asthma-COPD overlap syndrome 08/22/2022    home o2    Breast cancer 2002    left breast & lymph nodes-s/p sx with chemo    Cataracts, bilateral     Chronic diastolic heart failure 12/24/2019    Chronic kidney disease, stage 3     Class 1 obesity due to excess calories with serious comorbidity and body mass index (BMI) of 30.0 to 30.9 in adult 05/16/2024    History of chemotherapy     last treatment 12/2002 (had 8 treatments)    Hyperlipidemia 11/20/2016    Hypertension     Lymphedema of both lower extremities 01/25/2022    Nephrolithiasis 06/02/2014    Osteoporosis     Paroxysmal atrial fibrillation 06/07/2021    Renal osteodystrophy 01/14/2016    Shock 4/6/2025    Subclinical hypothyroidism 4/6/2025    Venous stasis dermatitis of both lower extremities 01/25/2022    Vitamin D insufficiency        Past Surgical History:   Procedure Laterality Date    ABLATION, ATRIAL FLUTTER, TYPICAL N/A 6/17/2024    Procedure: Ablation, Atrial Flutter, Typical;  Surgeon: Taz Church MD;  Location: [facility] EP LAB;  Service: Cardiology;  Laterality: N/A;  AFL, RFA, LORENE, MAKAYLA (cx if SR), anes, MB, 3 Prep    BREAST BIOPSY Left 2002    core bx, +    BREAST BIOPSY Right 2018    core    BREAST BIOPSY Right 2019    BREAST LUMPECTOMY Left 2002    CYSTOSCOPY  4/28/2022    Procedure: CYSTOSCOPY;  Surgeon: Vik Ware MD;  Location: [facility] OR [address] [place] [address];  Service: Urology;;    CYSTOSCOPY  5/30/2022    Procedure: CYSTOSCOPY;  Surgeon: Bonnie Knutson MD;  Location: [facility] OR [address] [place] [address];  Service: Urology;;    ECHOCARDIOGRAM,TRANSESOPHAGEAL N/A 6/17/2024    Procedure: Transesophageal echo (MAKAYLA) intra-procedure log documentation;  Surgeon: Nestor Martinez MD;  Location: [facility] EP LAB;  Service: Cardiology;  Laterality: N/A;    ERCP N/A 7/30/2024    Procedure: ERCP (ENDOSCOPIC  RETROGRADE CHOLANGIOPANCREATOGRAPHY);  Surgeon: Sierra Cotto MD;  Location: [facility] ENDO (2ND FLR);  Service: Endoscopy;  Laterality: N/A;    LASER LITHOTRIPSY  5/30/2022    Procedure: LITHOTRIPSY, USING LASER;  Surgeon: Bonnie Knutson MD;  [facility];  Service: Urology;;    LITHOTRIPSY      MASTECTOMY Left 06/2002    left-& lymph node dissection    REPLACEMENT OF STENT Right 5/30/2022    Procedure: REPLACEMENT, STENT;  Surgeon: Bonnie Knutson MD;  [facility];  Service: Urology;  Laterality: Right;    RETROGRADE PYELOGRAPHY Right 4/28/2022    Procedure: PYELOGRAM, RETROGRADE;  Surgeon: Vik Ware MD;  [facility];  Service: Urology;  Laterality: Right;    ROBOT-ASSISTED LAPAROSCOPIC PARTIAL NEPHRECTOMY USING DA JESÚS XI Right 3/11/2021    Procedure: XI ROBOTIC NEPHRECTOMY, PARTIAL;  Surgeon: Taz Ortega MD;  Location: [facility] OR 2ND FLR;  Service: Urology;  Laterality: Right;  4hr/ gen with regional  [place] confirmation:  [id number] for 11:15am case NC    URETEROSCOPIC REMOVAL OF URETERIC CALCULUS Right 5/30/2022    Procedure: REMOVAL, CALCULUS, URETER, URETEROSCOPIC;  Surgeon: Bonnie Knutson MD;  [facility];  Service: Urology;  Laterality: Right;    ureteroscopy Bilateral     6.14    URETEROSCOPY Right 5/30/2022    Procedure: URETEROSCOPY;  Surgeon: Bonnie Knutson MD;  [facility];  Service: Urology;  Laterality: Right;       Review of patient's allergies indicates:   Allergen Reactions    Adhesive tape-silicones     Adhesive Rash     Pt states she removed a LATEX bandaid and the skin beneath was swollen and red. No other latex causes a reaction.       No current facility-administered medications on file prior to encounter.     Current Outpatient Medications on File Prior to Encounter   Medication Sig    acetaminophen (TYLENOL) 500 MG tablet     albuterol (VENTOLIN HFA) 90 mcg/actuation inhaler  Inhale 1 puff into the lungs.    albuterol sulfate (PROAIR RESPICLICK) 90 mcg/actuation inhaler 2 puffs as needed Inhalation every 6 hrs 30 days    amoxicillin-clavulanate 500-125mg (AUGMENTIN) 500-125 mg Tab Take 1 tablet by mouth.    amoxicillin-clavulanate 875-125mg (AUGMENTIN) 875-125 mg per tablet Take 1 tablet by mouth 2 (two) times daily.    aspirin (ECOTRIN) 81 MG EC tablet 1 tablet Orally Once a day 30 days    BD POSIFLUSH NORMAL SALINE 0.9 injection     biotin 1 mg Cap 1 capsule Orally Once a day 30 days    budesonide-glycopyr-formoterol (BREZTRI AEROSPHERE) 160-9-4.8 mcg/actuation HFAA Inhale 2 puffs into the lungs 2 (two) times daily.    bumetanide (BUMEX) 1 MG tablet Take 1 tablet (1 mg total) by mouth once daily. Okay to take bumex 1mg twice daily as needed for worsening shortness of breath, swelling or 3lb weight gain    busPIRone (BUSPAR) 5 MG Tab 1 tablet Orally once daily as needed 30 days    ciprofloxacin HCl (CIPRO) 500 MG tablet Take 500 mg by mouth.    ferrous sulfate (FEOSOL) 325 mg (65 mg iron) Tab tablet Take 1 tablet (325 mg total) by mouth every other day.    fluticasone propionate (FLONASE) 50 mcg/actuation nasal spray 1 spray by Each Nostril route daily as needed for Rhinitis or Allergies.    inhalation spacing device ([facility]) Use as directed for inhalation.    levalbuterol (XOPENEX) 1.25 mg/3 mL nebulizer solution Take 1 ampule by nebulization every 8 (eight) hours as needed for Wheezing or Shortness of Breath.    magnesium hydroxide 400 mg/5 ml (MILK OF MAGNESIA) 400 mg/5 mL Susp 30 ml as neded Orally once daily    meclizine (ANTIVERT) 12.5 mg tablet Take 1 tablet (12.5 mg total) by mouth Daily.    metoprolol succinate (TOPROL-XL) 25 MG 24 hr tablet Take ½ tablet (12.5 mg total) by mouth once daily.    midodrine (PROAMATINE) 2.5 MG Tab Take 1 tablet (2.5 mg total) by mouth 3 (three) times daily.    ondansetron (ZOFRAN) 4 MG tablet 1 tablet as needed for nausea Orally  every 8 hours 7 days    polyethylene glycol (MIRALAX) 17 gram/dose powder     potassium chloride (MICRO-K) 10 MEQ CpSR Take 2 capsules (20 mEq total) by mouth once daily.    potassium chloride SA (K-DUR,KLOR-CON M) 10 MEQ tablet Take 10 mEq by mouth.    potassium chloride SA (K-DUR,KLOR-CON) 20 MEQ tablet Take 20 mEq by mouth.    predniSONE (DELTASONE) 10 MG tablet Take 10 mg by mouth 2 (two) times daily.    Saccharomyces boulardii (FLORASTOR) 250 mg capsule Take 1 capsule by mouth.    senna-docusate 8.6-50 mg (PERICOLACE) 8.6-50 mg per tablet Take by mouth once daily.    traZODone (DESYREL) 50 MG tablet Take 0.5 tablets (25 mg total) by mouth nightly as needed for Insomnia (Anxiety or insomnia).    triamcinolone acetonide 0.1% (KENALOG) 0.1 % cream Apply externally to affected area twice a day until clear    vitamin D (VITAMIN D3) 1000 units Tab Take 2 tablets (2,000 Units total) by mouth once daily.     Family History       Problem Relation (Age of Onset)    Arthritis Mother, Sister    Bone cancer Mother    Breast cancer Mother    Cancer Father    Crohn's disease Daughter    Fibroids Daughter    No Known Problems Brother, Daughter          Tobacco Use    Smoking status: Former     Current packs/day: 0.00     Average packs/day: 1 pack/day for 50.0 years (50.0 ttl pk-yrs)     Types: Cigarettes     Start date: 1960     Quit date: 5/20/2009     Years since quitting: 15.8    Smokeless tobacco: Former   Substance and Sexual Activity    Alcohol use: No    Drug use: No    Sexual activity: Not Currently     Partners: Male     Birth control/protection: Partner-Vasectomy     ROSUnable to obtain due to sedation.   Objective:     Vital Signs (Most Recent):  Temp: (!) 93.6 °F (34.2 °C) (04/06/25 1001)  Pulse: 81 (04/06/25 1001)  Resp: 20 (04/06/25 1001)  BP: 118/60 (04/06/25 0901)  SpO2: 99 % (04/06/25 1001) Vital Signs (24h Range):  Temp:  [93.6 °F (34.2 °C)-96.8 °F (36 °C)] 93.6 °F (34.2 °C)  Pulse:  [] 81  Resp:   [17-45] 20  SpO2:  [96 %-100 %] 99 %  BP: ()/(31-99) 118/60  Arterial Line BP: (125-139)/(36-43) 139/43       Weight: 75 kg (165 lb 5.5 oz)  Body mass index is 28.38 kg/m².    SpO2: 99 %        Physical Exam   Constitutional:       Appearance: She is ill-appearing.      Comments: Intubated and sedated   HENT:      Head: Normocephalic and atraumatic.      Mouth/Throat:      Mouth: Mucous membranes are moist.   Eyes:      General: No scleral icterus.  Cardiovascular:      Pulses:           Carotid pulses are 2+ on the right side and 2+ on the left side.       Radial pulses are 2+ on the right side and 2+ on the left side.        Dorsalis pedis pulses are 1+ on the right side and 1+ on the left side.        Posterior tibial pulses are 1+ on the right side and 1+ on the left side.      Heart sounds: No murmur heard.     Comments: Paced rhythm  Pulmonary:      Comments: On mechanical support  Abdominal:      General: Bowel sounds are normal. There is distension.   Musculoskeletal:      Right lower leg: Edema present.      Left lower leg: Edema present.      Comments: +1 pitting edema, venous stasis   Skin:     Coloration: Skin is pale.      Comments: cold   Neurological:      Comments: Unable to assess as sedated     Significant Labs: All pertinent lab results from the last 24 hours have been reviewed.    Significant Imaging: Echocardiogram: Transthoracic echo (TTE) complete (Cupid Only):   Results for orders placed or performed during the hospital encounter of 01/29/25   Echo   Result Value Ref Range    LV Diastolic Volume 81.08 mL    Echo EF Estimated 52 %    LV Systolic Volume 38.79 mL    IVS 0.9 0.6 - 1.1 cm    LVIDd 4.3 3.5 - 6.0 cm    LVIDs 3.1 2.1 - 4.0 cm    LVOT diameter 1.9 cm    PW 0.9 0.6 - 1.1 cm    IVRT 63 ms    AV LVOT peak gradient 5 mmHg    LVOT mn grad 3 mmHg    LVOT peak apurva 1.2 m/s    LVOT peak VTI 24.1 cm    RV- razo basal diam 3.9 cm    RV S' 10.72 cm/s    LA size 2.8 cm    Left Atrium Major  Axis 5.0 cm    Left Atrium Minor Axis 5.0 cm    LA Vol (MOD) 98 mL    RA Major Axis 4.84 cm    RA Area 19.2 cm2    AV valve area 2.4 cm2    AV area by cont VTI 2.4 cm2    AV peak gradient 9 mmHg    AV mean gradient 4 mmHg    Ao peak norberto 1.5 m/s    Ao VTI 28.6 cm    MV Peak E Norberto 1.37 m/s    TDI LATERAL 0.07 m/s    TDI SEPTAL 0.07 m/s    MV peak gradient 4 mmHg    TV peak gradient 36 mmHg    TR Max Norberto 3.0 m/s    Ascending aorta 2.86 cm    STJ 2.51 cm    IVC diameter 2.70 cm    Sinus 3.24 cm    LA WIDTH 4.3 cm    RA Width 3.27 cm    TAPSE 2.23 cm    BSA 1.7 m2    LVOT stroke volume 68.3 cm3    LV Systolic Volume Index 23.1 mL/m2    LV Diastolic Volume Index 48.26 mL/m2    LVOT area 2.8 cm2    FS 27.9 (A) 28 - 44 %    Left Ventricle Relative Wall Thickness 0.42 cm    LV mass 123.3 g    LV Mass Index 73.4 g/m2    E/E' ratio 20 m/s    LV SEPTAL E/E' RATIO 19.6 m/s    TOMÁS 30 mL/m2    LV LATERAL E/E' RATIO 19.6 m/s    LA Vol 51 cm3    RV/LV Ratio 0.91 cm    TOMÁS (MOD) 58 mL/m2    AV Velocity Ratio 0.80     AV index (prosthetic) 0.84     KHOI by Velocity Ratio 2.3 cm²    Triscuspid Valve Regurgitation Peak Gradient 36 mmHg    Mean e' 0.07 m/s    ZLVIDS 0.52     ZLVIDD -0.86     TV resting pulmonary artery pressure 51 mmHg    RV TB RVSP 18 mmHg    Est. RA pres 15 mmHg    EF 55 %    Narrative      Left Ventricle: The left ventricle is normal in size. Normal wall   thickness. Normal wall motion. There is normal systolic function. Ejection   fraction is approximately 55%.    Right Ventricle: Normal right ventricular cavity size. Wall thickness   is normal. Systolic function is borderline low.    Left Atrium: Left atrium is severely dilated.    Mitral Valve: There is moderate posterior mitral annular calcification   present.    Tricuspid Valve: There is mild regurgitation.    Pulmonary Artery: The estimated pulmonary artery systolic pressure is   51 mmHg.    IVC/SVC: Elevated venous pressure at 15 mmHg.

## 2025-04-06 NOTE — PROVIDER PROGRESS NOTES - EMERGENCY DEPT.
Encounter Date: 4/6/2025    ED Physician Progress Notes          Intubation    Date/Time: 4/6/2025 3:45 AM  Location procedure was performed: [facility] EMERGENCY DEPARTMENT    Performed by: Paolo Can MD  Authorized by: Cal Sewell MD  Consent Done: Emergent Situation  Indications: airway protection  Intubation method: video-assisted  Patient status: paralyzed (RSI)  Preoxygenation: BVM  Sedatives: ketmine  Paralytic: rocuronium  Laryngoscope size: Glide 4  Tube size: 7.5 mm  Tube type: cuffed  Number of attempts: 1  Cricoid pressure: yes  Cords visualized: yes  Post-procedure assessment: ETCO2 monitor, CO2 detector and chest rise  Breath sounds: clear and equal  Cuff inflated: yes  ETT to teeth: 23 cm  Tube secured with: ETT patterson  Chest x-ray interpreted by me.  Chest x-ray findings: endotracheal tube in appropriate position  Patient tolerance: Patient tolerated the procedure well with no immediate complications      Signed,    Paolo Can MD  Emergency Medicine, PGY-1  [facility]

## 2025-04-06 NOTE — ASSESSMENT & PLAN NOTE
Anemia is likely due to chronic blood loss. Most recent hemoglobin and hematocrit are listed below.  Recent Labs     04/06/25  0234 04/06/25  0426   HGB 12.4  --    HCT 40.7 43.2     Plan  - Monitor serial CBC: Daily  - Transfuse PRBC if patient becomes hemodynamically unstable, symptomatic or H/H drops below 7/21.  - Patient has not received any PRBC transfusions to date  - Patient's anemia is currently stable  - holding iron supplementation while intubated

## 2025-04-06 NOTE — ASSESSMENT & PLAN NOTE
Ms. Misa Barajas is a 80 yo female with COPD on 2L home O2, HFpEF, AFL s/p RFA (6/17/24 w/ Dr. Church), CKD III, HTN, HLD, chronic venous insufficiency, orthostatic hypotension who was admitted to the hospital for complete heart block and shock. EP consulted for pacemaker evaluation.     Recommendation:   - Patient noted to intermittently be NSR. Continue monitoring with TVP. Threshold 0.6.   - Will likely require a pacemaker but will need acute hemodynamic instability to resolve.   - Hold any AV melissa blocking agents.   - For full recommendations, please see staff attestation.

## 2025-04-06 NOTE — HPI
Ms. Barajas is an 80 yo female who presented to [facility] with altered mental status and bradycardia requiring epinephrine and transcutaneous pacing by EMS on the field . Patient has a known medical history of COPD on 2L home O2, HFpEF, AFL s/p RFA (6/17/24 w/ Dr. Church), CKD III, HTN, HLD, chronic venous insufficiency, orthostatic hypotension. When seen this morning on 4/6/25, patient was intubated and sedated with vasopressor support, no family was at bedside therefore no history was obtained, all history is taken from chart review. On admission to the ED patient was found to have a TSH of 10.7 and free T4 of 0.78  indicating subclinical hypothyroid. On chart review it does not appear that patient has been diagnosed with thyroid disease in the past or has been on any thyroid replacement medications in the past. There are TSH values which are slightly above normal limits in the past but no above 5 uIU/mL. No exposure to amiodarone or lithium. Endocrinology was consulted for management of abnormal thyroid labs.

## 2025-04-06 NOTE — HPI
Ms. Misa Barajas is a 78 yo female with COPD on 2L home O2, HFpEF, AFL s/p RFA (6/17/24 w/ Dr. Church), HFpEF, CKD III, HTN, HLD, chronic venous insufficiency, previous hx of orthostatic hypotension who was brought via EMS to [facility] after daughter noted patient to be confused on the phone. As per daughter, patient was perfectly fine until right around midnight when she called daughter stating she wasn't feeling feel. EMS was called and upon arrivial patient was noted to be in CHB with Hrs in the 20s. They started epi and transcutaneous pacing. Given Versed for the transcutaneous pacing. They had to bag her in route. Upon arrival to ED, patient was noted to be AMS and was intubated for airway protection. TVP was placed and patient was admitted to the CCU for further care. EP consulted for evaluation of complete heart block.       after per history she was found confused and bradycardiac with HR in the 20s.They started epi and transcutaneous pacing. Given Versed for the transcutaneous pacing. They had to bag her in route. We do not have strip of underlying rhythm. Patient obtunded for which she was intubated for airway protection on arrival to the ED and transcutaneous pacing was exchanged to transvenous pacing via RIJ (threshold 0.6). Underlying rate 44 bifascicular block. Known RBBB. Per daughters patient had called them over the phone when they noticed she appeared confused. Due to concerns for a possible syncopal episode EMS was called. Prior to event patient had been complaining of malaise and nausea for which she took about two Zofran pills per the daughters.      She had been recently seen on 3/27/25 at the [facility] visit following recent admission for AHRF 2/2 ADHF and COPD exacerbation. On ED presentation BNP elevated to 2500, troponin elevated to 19, CXR with pulmonary edema. She was started on steroids, nebs and antibiotics in addition to IV diuresis. Updated TTE stable from prior, showing HFpEF with elevated  CVP 15. Her O2 requirements improved to baseline with diuresis and she was subsequently discharged. At that visit patient had not required midodrine as blood pressure had remained stable and she was asymptomatic.      Off note, patient was taken off OAC due to bleeding issues and no recurrent of typical atrial flutter after ablation.

## 2025-04-06 NOTE — PROVIDER PROGRESS NOTES - EMERGENCY DEPT.
Encounter Date: 4/6/2025    ED Physician Progress Notes          Central Line    Date/Time: 4/6/2025 3:00 AM    Performed by: Ilene Castelan MD  Authorized by: Cal Sewell MD    Location procedure was performed:  [facility] EMERGENCY DEPARTMENT  Indications:  Vascular access  Preparation:  Skin prepped with ChloraPrep  Skin prep agent dried: Skin prep agent completely dried prior to procedure    Sterile barriers: All five maximal sterile barriers used - gloves, gown, cap, mask and large sterile sheet    Hand hygiene: Hand hygiene performed immediately prior to central venous catheter insertion    Location:  Right internal jugular  Catheter type:  Cordis  Catheter size:  6 Fr  Ultrasound guidance: Yes    Vessel Caliber:  Large   patent  Needle advanced into vessel with real time ultrasound guidance.    Guidewire confirmed in vessel.    Image recorded and saved.    Steril sheath on probe.    Sterile gel used.  Manometry: Yes    Number of attempts:  1  Securement:  Line sutured, chlorhexidine patch, sterile dressing applied and blood return through all ports  Complications: No    Guidewire: guidewire removed intact    XRay:  Placement verified by x-ray  Adverse Events:  None    Successful transvenous pacing.  Pacer wire extended to 35 cm, patient has a appropriate capture.  Line secured.

## 2025-04-06 NOTE — CARE UPDATE
Patient remains sedated and intubated in CICU with TVP wire in place.    #Shock  - Etiology unclear, consider sepsis s/t UTI most likely at this time  - Continue BS ABX  - Follow Blood/Urine Cx  - Cautious IV boluses for goal CVP 8-10  - Trend LA  - Wean pressors for MAP >/= 60    #CHB  #Elevated Troponin  #A/C CHF  - TVP in place for CHB  - EP consulted, will need likely need PPM when more stable  - BNP and LA elevated but hemodynamics WNL  - Tren Tn to peak    #A/C Hypoxic Respiratory Failure  - On minimal ventilator settings  - Wean sedation to RASS goal of -2    #MARQUEZ  #Hyperkalemia  - Baseline Cr 1.2 to 1.4, 2.1 at admission  - K 6.0 in ED shifted and given Calcium  - BMP q4H and lokelma if K is elevated  - Shift and give IV Ca if EKG changes  - Nephrology consulted, appreciate recommendations  - Renally dose all meds    Stephan Max MD  [facility] [address] [place] [address]  [phone]

## 2025-04-06 NOTE — ASSESSMENT & PLAN NOTE
Patient presented with AMS and bradycardia  Thyroid labs indicative of subclinical hypothyroid on this admission, no apparent history of thyroid disease in the past     Lab Results   Component Value Date    TSH 10.722 (H) 04/06/2025    FREET4 0.78 04/06/2025      -In the setting of subclinical hypothyroid with these particular levels are not too impressive, there is a consideration of repeating labs after acute illness resolves instead of treatment however do not suspect these labs are contributing to current clinical presentation. I do not suspect this to be due to thyroid disease however will treat in the short term at least due to TSH level above 10.  -Due to TSH being above 10 will start patient off with levothyroxine 50 mcg tablets via any feeding tube, please ensure if any feeds are started to hold feeds at least 1 hour prior to and after levothryoxine administration to ensure proper absorption of medication  -Patient can be discharged home with levothyroxine 50 mcg upon discharge, please ensure patient is aware to take in the morning after an overnight fast and to wait at least 30 minutes prior to eating, drinking, or taking any other medications during that time.  -Can follow up outpatient with Endocrine department or PCP with thyroid labs in 4-6 weeks, please reach out to Endocrine to help in setting up labs and getting scheduled for discharge clinic.

## 2025-04-06 NOTE — SUBJECTIVE & OBJECTIVE
"PMH, PSH, FH, SH updated and reviewed     ROS:  Unable to obtain due to: Sedation,Intubation,Altered mental status,Critical illness,Reviewed ROS from note dated 4/6/25    Review of Systems   Unable to perform ROS: Intubated       Labs Reviewed and Include:    Recent Labs   Lab 04/06/25  0234 04/06/25  0833   CALCIUM >19.0* 10.3   ALBUMIN 2.8*  --     137   K 6.0* 4.3   CO2 19* 18*   * 109   BUN 29* 31*   CREATININE 2.1* 1.9*   ALKPHOS 139  --    ALT 28  --    AST 46*  --    BILITOT 0.4  --      Lab Results   Component Value Date    HGBA1C 4.9 01/30/2025       Nutritional status:   Body mass index is 28.38 kg/m².  Lab Results   Component Value Date    ALBUMIN 2.8 (L) 04/06/2025    ALBUMIN 3.0 (L) 03/27/2025    ALBUMIN 2.8 (L) 02/18/2025     No results found for: "PREALBUMIN"    Estimated Creatinine Clearance: 23 mL/min (A) (based on SCr of 1.9 mg/dL (H)).    POCT Glucose results:    Current Insulin Regimen:       PHYSICAL EXAMINATION:  Vitals:    04/06/25 1001   BP:    Pulse: 81   Resp: 20   Temp: (!) 93.6 °F (34.2 °C)     Body mass index is 28.38 kg/m².     Physical Exam  Constitutional:       Appearance: She is ill-appearing.   Cardiovascular:      Comments: Currently with vasopressor use  Pulmonary:      Comments: Placed on mechanical ventilation  Musculoskeletal:      Cervical back: Neck supple.          "

## 2025-04-06 NOTE — PROGRESS NOTES
"Pharmacokinetic Initial Assessment: IV Vancomycin    Assessment/Plan:    Initiate intravenous vancomycin with loading dose of 1250 mg once with subsequent doses when random concentrations are less than 20 mcg/mL  Desired empiric serum trough concentration is 15 to 20 mcg/mL  Draw vancomycin random level on 4/7 at 400 (w/ AM labs).  Pharmacy will continue to follow and monitor vancomycin.      Please contact pharmacy at extension [phone] with any questions regarding this assessment.     Thank you for the consult,   Erika Teranuyen       Patient brief summary:  Misa Barajas is a 79 y.o. female initiated on antimicrobial therapy with IV Vancomycin for treatment of suspected sepsis    Drug Allergies:   Review of patient's allergies indicates:   Allergen Reactions    Adhesive tape-silicones     Adhesive Rash     Pt states she removed a LATEX bandaid and the skin beneath was swollen and red. No other latex causes a reaction.       Actual Body Weight:   65.8kg    Renal Function:   Estimated Creatinine Clearance: 19.6 mL/min (A) (based on SCr of 2.1 mg/dL (H)).,     Dialysis Method (if applicable):  N/A    CBC (last 72 hours):  Recent Labs   Lab Result Units 04/06/25  0234   WBC K/uL 10.54   HGB gm/dL 12.4   HCT % 40.7   Platelet Count K/uL 224   Lymph % % 34.3   Mono % % 9.2   Eos % % 4.1   Basophil % % 1.1       Metabolic Panel (last 72 hours):  Recent Labs   Lab Result Units 04/06/25  0234   Sodium mmol/L 136   Potassium mmol/L 6.0*   Chloride mmol/L 111*   CO2 mmol/L 19*   Glucose mg/dL 209*   BUN mg/dL 29*   Creatinine mg/dL 2.1*   Albumin g/dL 2.8*   Bilirubin Total mg/dL 0.4   ALP unit/L 139   AST unit/L 46*   ALT unit/L 28   Magnesium  mg/dL 2.2       Drug levels (last 3 results):  No results for input(s): "VANCOMYCINRA", "VANCORANDOM", "VANCOMYCINPE", "VANCOPEAK", "VANCOMYCINTR", "VANCOTROUGH" in the last 72 hours.    Microbiologic Results:  Microbiology Results (last 7 days)       Procedure Component Value Units " Date/Time    Blood culture [[id number]] Collected: 04/06/25 0432    Order Status: Sent Specimen: Blood from Peripheral, Wrist, Left Updated: 04/06/25 0439    Blood culture [[id number]] Collected: 04/06/25 0432    Order Status: Sent Specimen: Blood from Peripheral, Wrist, Left Updated: 04/06/25 0439

## 2025-04-06 NOTE — ED NOTES
I-STAT Chem-8+ Results:   Value Reference Range   Sodium 135 136-145 mmol/L   Potassium  5.9 3.5-5.1 mmol/L   Chloride 114  mmol/L   Ionized Calcium >2.5 1.06-1.42 mmol/L   CO2 (measured) 21 23-29 mmol/L   Glucose 213  mg/dL   BUN 29 6-30 mg/dL   Creatinine 2.4 0.5-1.4 mg/dL   Hematocrit 37 36-54%

## 2025-04-06 NOTE — ED TRIAGE NOTES
Misa Barajas, a 79 y.o. female presents to the ED via EMS from home. Pt hypotensive, bradycardic, arrives being transcutaneously paced and bagged.       Chief Complaint   Patient presents with    Bradycardia     Arrives via EMS hypotensive 80s/50s and bradycadic in the 20s.     Review of patient's allergies indicates:   Allergen Reactions    Adhesive tape-silicones     Adhesive Rash     Pt states she removed a LATEX bandaid and the skin beneath was swollen and red. No other latex causes a reaction.     Past Medical History:   Diagnosis Date    Acute biliary pancreatitis 07/27/2024    Anemia 07/12/2024    Aortic atherosclerosis     Arthritis     knee joint pain    Asthma-COPD overlap syndrome 08/22/2022    home o2    Breast cancer 2002    left breast & lymph nodes-s/p sx with chemo    Cataracts, bilateral     Chronic diastolic heart failure 12/24/2019    Chronic kidney disease, stage 3     Class 1 obesity due to excess calories with serious comorbidity and body mass index (BMI) of 30.0 to 30.9 in adult 05/16/2024    History of chemotherapy     last treatment 12/2002 (had 8 treatments)    Hyperlipidemia 11/20/2016    Hypertension     Lymphedema of both lower extremities 01/25/2022    Nephrolithiasis 06/02/2014    Osteoporosis     Paroxysmal atrial fibrillation 06/07/2021    Renal osteodystrophy 01/14/2016    Venous stasis dermatitis of both lower extremities 01/25/2022    Vitamin D insufficiency

## 2025-04-06 NOTE — ASSESSMENT & PLAN NOTE
Patient with Hypoxic Respiratory failure which is Chronic.  she is on home oxygen at 2 LPM. Supplemental oxygen was provided and noted- Vent Mode: A/C  Oxygen Concentration (%):  [] 30  Resp Rate Total:  [18 br/min-19 br/min] 18 br/min  Vt Set:  [400 mL] 400 mL  PEEP/CPAP:  [5 cmH20] 5 cmH20  Mean Airway Pressure:  [7.8 cmH20] 7.8 cmH20    .   Signs/symptoms of respiratory failure include- tachypnea, increased work of breathing, use of accessory muscles, and lethargy. Contributing diagnoses includes - underlying COPD

## 2025-04-07 LAB
OHS QRS DURATION: 146 MS
OHS QRS DURATION: 154 MS
OHS QRS DURATION: 160 MS
OHS QRS DURATION: 164 MS
OHS QRS DURATION: 164 MS
OHS QRS DURATION: 166 MS
OHS QRS DURATION: 168 MS
OHS QRS DURATION: 178 MS
OHS QTC CALCULATION: 458 MS
OHS QTC CALCULATION: 466 MS
OHS QTC CALCULATION: 486 MS
OHS QTC CALCULATION: 500 MS
OHS QTC CALCULATION: 517 MS
OHS QTC CALCULATION: 524 MS
OHS QTC CALCULATION: 532 MS
OHS QTC CALCULATION: 551 MS

## 2025-04-07 NOTE — PROGRESS NOTES
[facility]  Cardiology  Progress Note    Patient Name: Misa Barajas  MRN: [id number]  Admission Date: 4/6/2025  Hospital Length of Stay: 1 days  Code Status: Full Code  Attending Physician: Honorio Ortiz MD  Primary Care Provider: Isela Langston MD  Expected Discharge Date:   Principal Problem: Complete heart block    Subjective     Hospital Course:  Admitted to CCU for CHB in s/o hyperkalemia & in the presence of conduction disease/RBBB & LAHB - currently stable w/ temporary wire. Septic shock d/t UTI; weaned vasopressors. Acute on chronic hypoxic/hypercapnic RF, intubated. MARQUEZ on CKD; improved w/ good UOP response to diuresis.    Objective    Interval History: NAEON, AF HDS off vasopressors (intermittently on NE 0.01 overnight), intubated on min vent settings. Wakes up & follows commands on dexmedetomidine & fentanyl. On vanc/pip-tazo for UTI/septic shock    ROS  Objective:     Vital Signs (Most Recent):  Temp: 97.7 °F (36.5 °C) (04/07/25 0901)  Pulse: 75 (04/07/25 0901)  Resp: 20 (04/07/25 0901)  BP: (!) 137/56 (04/07/25 0719)  SpO2: 95 % (04/07/25 0901) Vital Signs (24h Range):  Temp:  [93.6 °F (34.2 °C)-99 °F (37.2 °C)] 97.7 °F (36.5 °C)  Pulse:  [56-93] 75  Resp:  [20-36] 20  SpO2:  [94 %-99 %] 95 %  BP: ()/(38-59) 137/56  Arterial Line BP: ()/(38-58) 136/58     Weight: 76 kg (167 lb 8.8 oz)  Body mass index is 28.76 kg/m².     SpO2: 95 %         Intake/Output Summary (Last 24 hours) at 4/7/2025 0947  Last data filed at 4/7/2025 0901  Gross per 24 hour   Intake 2088.05 ml   Output 1355 ml   Net 733.05 ml       Lines/Drains/Airways       Central Venous Catheter Line  Duration              Introducer Single Lumen 04/06/25 0230 Internal Jugular Right 1 day    Percutaneous Central Line - Triple Lumen  04/06/25 0811 Internal Jugular Left 1 day              Drain  Duration                  NG/OG Tube 04/06/25 0349 Left mouth 1 day         Urethral Catheter 04/06/25  0645 1 day              Airway  Duration                  Airway - Non-Surgical 04/06/25 0228 Endotracheal Tube 1 day              Arterial Line  Duration             Arterial Line 04/06/25 0812 Left Radial 1 day              Line  Duration                  Pacer Wires 04/06/25 0256 1 day              Peripheral Intravenous Line  Duration                  Peripheral IV - Single Lumen 04/06/25 0216 18 G Posterior;Right Forearm 1 day         Peripheral IV - Single Lumen 04/06/25 0426 18 G Anterior;Right Hand 1 day         Peripheral IV - Single Lumen 04/06/25 18 G Left Antecubital 1 day                       Physical Exam  Vitals and nursing note reviewed.   Constitutional:       Appearance: She is ill-appearing.      Comments: Intubated and sedated   HENT:      Head: Normocephalic and atraumatic.      Mouth/Throat:      Mouth: Mucous membranes are moist.   Eyes:      General: No scleral icterus.  Cardiovascular:      Pulses:           Carotid pulses are 2+ on the right side and 2+ on the left side.       Radial pulses are 2+ on the right side and 2+ on the left side.        Dorsalis pedis pulses are 1+ on the right side and 1+ on the left side.        Posterior tibial pulses are 1+ on the right side and 1+ on the left side.      Heart sounds: No murmur heard.     Comments: Paced rhythm  Pulmonary:      Comments: On mechanical support  Abdominal:      General: Bowel sounds are normal. There is distension.   Musculoskeletal:      Right lower leg: Edema present.      Left lower leg: Edema present.      Comments: +1 pitting edema, venous stasis   Skin:     Coloration: Skin is pale.      Comments: cold   Neurological:      Comments: Unable to assess as sedated            Significant Labs: All pertinent lab results from the last 24 hours have been reviewed.    Significant Imaging:  reviewed    Assessment & Plan  Complete heart block  Shock  Ms. Misa Barajas is a 78 yo female with COPD on 2L home O2, HFpEF, AFL s/p RFA  (6/17/24 w/ Dr. Church), CKD III, HTN, HLD, chronic venous insufficiency, orthostatic hypotension who was brought via EMS after per history she was found confused and bradycardiac with HR in the 20s.They started epi and transcutaneous pacing. They had to bag her in route. We do not have strip of underlying rhythm. Patient obtunded for which she was intubated on arrival to the ED and transcutaneous pacing was exchanged to transvenous pacing via RIJ. Underlying rate 44 bifascicular block. Known RBBB.    -Unclear etiology ddx include sepsis, mesenteric ischemia?  - elevated lactic acid. Awaiting CT h/c/a/p  - bradycardia most likely in the setting of electrolyte disturbance and abnormal thyroid function  - correcting electrolyte disturbance. Suspect most likely she will then not require tvp.   - obtaining bcx and ucx. Broad spectrum abx ppx and deescalating as needed.   - orosco placement  RBBB  Chronic   CKD (chronic kidney disease) stage 3, GFR 30-59 ml/min  Now MARQUEZ on CKD3. Nephrology consulted. Patient on bumex and potassium supplement as home which have been held  Centrilobular emphysema  Prior history of smoking. Continue scheduled inhalers once extubated. Antibiotics and Supplemental oxygen and monitor respiratory status closely.   Chronic respiratory failure with hypoxia  Patient with Hypoxic Respiratory failure which is Chronic.  she is on home oxygen at 2 LPM. Supplemental oxygen was provided and noted- Vent Mode: A/C  Oxygen Concentration (%):  [21-30] 21  Resp Rate Total:  [20 br/min-35 br/min] 20 br/min  Vt Set:  [430 mL] 430 mL  PEEP/CPAP:  [5 cmH20] 5 cmH20  Mean Airway Pressure:  [8.8 ooN10-66 cmH20] 9.6 cmH20    .   Signs/symptoms of respiratory failure include- tachypnea, increased work of breathing, use of accessory muscles, and lethargy. Contributing diagnoses includes - underlying COPD   MARQUEZ (acute kidney injury)  .BMP reviewed- noted Estimated Creatinine Clearance: 19.6 mL/min (A) (based on SCr of  2.1 mg/dL (H)). according to latest data. Based on current GFR, CKD stage is stage 4 - GFR 15-29.  Monitor UOP and serial BMP and adjust therapy as needed. Renally dose meds. Avoid nephrotoxic medications and procedures.  Recent Labs     04/07/25  0019 04/07/25  0203 04/07/25  0318   CREATININE 1.8* 1.8* 1.9*   EGFRNORACEVR 28* 28* 26*     - correcting electrolytes as needed. Unclear if hypercalcemia was secondary to medications but repeating levels     Acute on chronic congestive heart failure   Most recent BNP and echo results are listed below.  Recent Labs     04/06/25  0234   *     Latest ECHO  Results for orders placed during the hospital encounter of 01/29/25    Echo    Interpretation Summary    Left Ventricle: The left ventricle is normal in size. Normal wall thickness. Normal wall motion. There is normal systolic function. Ejection fraction is approximately 55%.    Right Ventricle: Normal right ventricular cavity size. Wall thickness is normal. Systolic function is borderline low.    Left Atrium: Left atrium is severely dilated.    Mitral Valve: There is moderate posterior mitral annular calcification present.    Tricuspid Valve: There is mild regurgitation.    Pulmonary Artery: The estimated pulmonary artery systolic pressure is 51 mmHg.    IVC/SVC: Elevated venous pressure at 15 mmHg.    Current Heart Failure Medications: have been held due to bradycardia and MARQUEZ  NORepinephrine 4 mg in sodium chloride 0.9% 250 mL infusion (premix), Continuous, Intravenous    Plan  - Monitor strict I&Os and daily weights.    - Place on telemetry  - repeating official echo, bedside echo appears to have normal LVEF, IVC cannot be assessed as patient intubated  Orthostatic hypotension  holding midodrine and per history patient had not required it since last hospitalization  Iron deficiency anemia due to chronic blood loss  Anemia is likely due to chronic blood loss. Most recent hemoglobin and hematocrit are listed  below.  Recent Labs     04/06/25  0234 04/06/25  0426 04/06/25  0636 04/06/25  0833 04/07/25  0318   HGB 12.4  --   --  11.9* 12.5   HCT 40.7   < > 39 37.7 38.6    < > = values in this interval not displayed.     Plan  - Monitor serial CBC: Daily  - Transfuse PRBC if patient becomes hemodynamically unstable, symptomatic or H/H drops below 7/21.  - Patient has not received any PRBC transfusions to date  - Patient's anemia is currently stable  - holding iron supplementation while intubated  Subclinical hypothyroidism  TSH 10.722 with normal FT4. Consulting endo.      VTE Risk Mitigation (From admission, onward)           Ordered     heparin (porcine) injection 5,000 Units  Every 8 hours         04/06/25 1027     IP VTE HIGH RISK PATIENT  Once         04/06/25 0512     Place sequential compression device  Until discontinued         04/06/25 0454                    Talha Salcido MD  Cardiology  [facility]

## 2025-04-07 NOTE — PROGRESS NOTES
[facility]  Cardiac Electrophysiology  Progress Note    Admission Date: 4/6/2025  Code Status: Full Code   Attending Physician: Honorio Ortiz MD   Expected Discharge Date:   Principal Problem:Complete heart block    Subjective:     Interval History: NAEON. Remains intubated. Patient remain mostly in NSR w/ only 2 instances of requiring pacing for a few beats in the past 24hrs.     ROS 12 point ROS negative except for HPI  Objective:     Vital Signs (Most Recent):  Temp: 97.7 °F (36.5 °C) (04/07/25 0901)  Pulse: 75 (04/07/25 0901)  Resp: 20 (04/07/25 0901)  BP: (!) 137/56 (04/07/25 0719)  SpO2: 95 % (04/07/25 0901) Vital Signs (24h Range):  Temp:  [93.6 °F (34.2 °C)-99 °F (37.2 °C)] 97.7 °F (36.5 °C)  Pulse:  [56-93] 75  Resp:  [20-36] 20  SpO2:  [94 %-99 %] 95 %  BP: ()/(38-59) 137/56  Arterial Line BP: ()/(38-58) 136/58     Weight: 76 kg (167 lb 8.8 oz)  Body mass index is 28.76 kg/m².     SpO2: 95 %        Physical Exam     Constitutional:       Appearance: She is ill-appearing.      Comments: Intubated and sedated   HENT:      Head: Normocephalic and atraumatic.      Mouth/Throat:      Mouth: Mucous membranes are moist.   Eyes:      General: No scleral icterus.  Cardiovascular:      Pulses:           Carotid pulses are 2+ on the right side and 2+ on the left side.       Radial pulses are 2+ on the right side and 2+ on the left side.        Dorsalis pedis pulses are 1+ on the right side and 1+ on the left side.        Posterior tibial pulses are 1+ on the right side and 1+ on the left side.      Heart sounds: No murmur heard.     Comments: Paced rhythm  Pulmonary:      Comments: On mechanical support  Abdominal:      General: Bowel sounds are normal. There is distension.   Musculoskeletal:      Right lower leg: Edema present.      Left lower leg: Edema present.      Comments: +1 pitting edema, venous stasis   Skin:     Coloration: Skin is pale.      Comments: cold    Neurological:      Comments: Unable to assess as sedated   Significant Labs: All pertinent lab results from the last 24 hours have been reviewed.    Significant Imaging: Echocardiogram: Transthoracic echo (TTE) complete (Cupid Only):   Results for orders placed or performed during the hospital encounter of 04/06/25   Echo   Result Value Ref Range    LV Diastolic Volume 69 mL    Echo EF Estimated 60 %    LV Systolic Volume 28 mL    IVS 0.8 0.6 - 1.1 cm    LVIDd 4.0 3.5 - 6.0 cm    LVIDs 2.7 2.1 - 4.0 cm    LVOT diameter 1.9 cm    PW 0.9 0.6 - 1.1 cm    AV LVOT peak gradient 3 mmHg    LVOT mn grad 2 mmHg    LVOT peak norberto 0.9 m/s    LVOT peak VTI 16.0 cm    RV- razo basal diam 2.5 cm    LA size 3.9 cm    Left Atrium Major Axis 4.3 cm    Left Atrium Minor Axis 4.0 cm    LA Vol (MOD) 32 mL    RA Major [place] 4.15 cm    AV valve area 1.8 cm2    AV area by cont VTI 1.8 cm2    AV peak gradient 7 mmHg    AV mean gradient 4 mmHg    Ao peak norberto 1.3 m/s    Ao VTI 24.8 cm    MV Peak A Norberto 1.19 m/s    E wave deceleration time 330 ms    MV Peak E Norberto 0.77 m/s    E/A ratio 0.65     LV LATERAL E/E' RATIO 25.7     LV SEPTAL E/E' RATIO 15.4     TDI LATERAL 0.03 m/s    TDI SEPTAL 0.05 m/s    MV peak gradient 5 mmHg    MV mean gradient 2 mmHg    TV peak gradient 22 mmHg    TR Max Norberto 2.3 m/s    Ascending aorta 2.61 cm    STJ 2.30 cm    Sinus 2.95 cm    LA WIDTH 2.4 cm    RA Width 3.25 cm    TAPSE 1.53 cm    BSA 1.85 m2    LVOT stroke volume 45.3 cm3    LV Systolic Volume Index 15.5 mL/m2    LV Diastolic Volume Index 38.12 mL/m2    LVOT area 2.8 cm2    FS 32.5 28 - 44 %    Left Ventricle Relative Wall Thickness 0.45 cm    LV mass 101.4 g    LV Mass Index 56.0 g/m2    E/E' ratio 19 m/s    TOMÁS 18 mL/m2    LA Vol 33 cm3    RV/LV Ratio 0.63 cm    TOMÁS (MOD) 18 mL/m2    AV Velocity Ratio 0.69     AV index (prosthetic) 0.65     KHOI by Velocity Ratio 2.0 cm²    Mean e' 0.04 m/s    ZLVIDS -1.08     ZLVIDD -2.25     Narrative      Left  Ventricle: The left ventricle is normal in size. Normal wall   thickness. There is low normal systolic function with a visually estimated   ejection fraction of 50 - 55%. Grade II diastolic dysfunction.    Right Ventricle: The right ventricle is normal in size. Wall thickness   is normal. Systolic function is normal.    IVC/SVC: Patient is ventilated, cannot use IVC diameter to estimate   right atrial pressure.       Assessment and Plan:     * Complete heart block  Ms. Misa Barajas is a 78 yo female with COPD on 2L home O2, HFpEF, AFL s/p RFA (6/17/24 w/ Dr. Church), CKD III, HTN, HLD, chronic venous insufficiency, orthostatic hypotension who was admitted to the hospital for complete heart block and shock. EP consulted for pacemaker evaluation.     Recommendation:   - Patient noted to intermittently be NSR. Continue monitoring with TVP. Threshold 1.5 (0.6 yesterday)  - Will possibly require a pacemaker but will continue to moitor her conduction system for improvement as her acute medical conditions are being treated.   - Hold any AV melissa blocking agents.   - For full recommendations, please see staff attestation.         Yajaira Menjivar MD  Cardiac Electrophysiology  [facility]

## 2025-04-07 NOTE — ASSESSMENT & PLAN NOTE
Anemia is likely due to chronic blood loss. Most recent hemoglobin and hematocrit are listed below.  Recent Labs     04/06/25  0234 04/06/25  0426 04/06/25  0636 04/06/25  0833 04/07/25  0318   HGB 12.4  --   --  11.9* 12.5   HCT 40.7   < > 39 37.7 38.6    < > = values in this interval not displayed.     Plan  - Monitor serial CBC: Daily  - Transfuse PRBC if patient becomes hemodynamically unstable, symptomatic or H/H drops below 7/21.  - Patient has not received any PRBC transfusions to date  - Patient's anemia is currently stable  - holding iron supplementation while intubated

## 2025-04-07 NOTE — SUBJECTIVE & OBJECTIVE
Interval History: NAEON, AF HDS on NG gtt to SBP <160, intubated on min vent settings. Alert & follows commands on dexm; on SBT. On vanc/pip-tazo for UTI/septic shock    ROS  Objective:     Vital Signs (Most Recent):  Temp: 98.4 °F (36.9 °C) (04/08/25 0734)  Pulse: 74 (04/08/25 0734)  Resp: (!) 21 (04/08/25 0734)  BP: 135/60 (04/08/25 0300)  SpO2: (!) 94 % (04/08/25 0734) Vital Signs (24h Range):  Temp:  [95.5 °F (35.3 °C)-98.4 °F (36.9 °C)] 98.4 °F (36.9 °C)  Pulse:  [60-75] 74  Resp:  [8-33] 21  SpO2:  [89 %-100 %] 94 %  BP: (112-176)/(54-66) 135/60  Arterial Line BP: ()/(41-76) 151/63     Weight: 76 kg (167 lb 8.8 oz)  Body mass index is 28.76 kg/m².     SpO2: (!) 94 %       Intake/Output Summary (Last 24 hours) at 4/8/2025 0809  Last data filed at 4/8/2025 0600  Gross per 24 hour   Intake 1344.54 ml   Output 1285 ml   Net 59.54 ml     Lines/Drains/Airways       Central Venous Catheter Line  Duration              Introducer Single Lumen 04/06/25 0230 Internal Jugular Right 2 days    Percutaneous Central Line - Triple Lumen  04/06/25 0811 Internal Jugular Left 1 day              Drain  Duration                  NG/OG Tube 04/06/25 0349 Left mouth 2 days         Urethral Catheter 04/06/25 0645 2 days              Airway  Duration                  Airway - Non-Surgical 04/06/25 0228 Endotracheal Tube 2 days              Arterial Line  Duration             Arterial Line 04/06/25 0812 Left Radial 1 day              Line  Duration                  Pacer Wires 04/06/25 0256 2 days              Peripheral Intravenous Line  Duration                  Peripheral IV - Single Lumen 04/06/25 0216 18 G Posterior;Right Forearm 2 days                   Physical Exam  Vitals and nursing note reviewed.   Constitutional:       Appearance: She is ill-appearing.      Comments: Intubated and sedated   HENT:      Head: Normocephalic and atraumatic.      Mouth/Throat:      Mouth: Mucous membranes are moist.   Eyes:      General: No  scleral icterus.  Cardiovascular:      Pulses:           Carotid pulses are 2+ on the right side and 2+ on the left side.       Radial pulses are 2+ on the right side and 2+ on the left side.        Dorsalis pedis pulses are 1+ on the right side and 1+ on the left side.        Posterior tibial pulses are 1+ on the right side and 1+ on the left side.      Heart sounds: No murmur heard.     Comments: Paced rhythm  Pulmonary:      Comments: On mechanical support  Abdominal:      General: Bowel sounds are normal. There is distension.   Musculoskeletal:      Right lower leg: Edema present.      Left lower leg: Edema present.      Comments: +1 pitting edema, venous stasis   Skin:     Coloration: Skin is pale.      Comments: cold   Neurological:      Comments: Unable to assess as sedated        Significant Labs: All pertinent lab results from the last 24 hours have been reviewed.    Significant Imaging:  reviewed

## 2025-04-07 NOTE — PLAN OF CARE
Cardiac ICU Care Plan    CVP: 7, 6, 6  ScvO2: 63%    NAEON    POC reviewed with Misa Barajas and family. Questions and concerns addressed. See below and flowsheets for full assessment and VS info.     Neuro:  [place] Coma Scale  Best Eye Response: 3-->(E3) to speech  Best Motor Response: 6-->(M6) obeys commands  Best Verbal Response: 1-->(V1) none (Intubated/sedated)  [place] Coma Scale Score: 10  Assessment Qualifiers: patient chemically sedated or paralyzed, patient intubated  Pupil PERRLA: yes    24 hr Temp:  [93.6 °F (34.2 °C)-99 °F (37.2 °C)]      CV:  Rhythm: normal sinus rhythm   DVT prophylaxis: VTE Core Measure: Pharmacological prophylaxis initiated/maintained    CVP (mean): (S) 6 mmHg (04/06/25 2300)    SVO2 (%): (S) 63 % (04/06/25 1900)    Pulses  Right Radial Pulse: 2+ (normal)  Left Radial Pulse: 2+ (normal)  Right Dorsalis Pedis Pulse: 1+ (weak)  Left Dorsalis Pedis Pulse: 1+ (weak)  Right Posterior Tibial Pulse: 1+ (weak)  Left Posterior Tibial Pulse: 1+ (weak)    Resp:     Vent Mode: A/C  Set Rate: 20 BPM  Oxygen Concentration (%): 21  Vt Set: 430 mL  PEEP/CPAP: 5 cmH20    GI/:  GI prophylaxis: yes  Diet/Nutrition Received: NPO  Last Bowel Movement: 04/05/25  Voiding Characteristics: urethral catheter (bladder)       Urethral Catheter 04/06/25 0645-Reason for Continuing Urinary Catheterization: Critically ill in ICU and requiring hourly monitoring of intake/output   Intake/Output Summary (Last 24 hours) at 4/7/2025 0408  Last data filed at 4/7/2025 0400  Gross per 24 hour   Intake 2185.2 ml   Output 1190 ml   Net 995.2 ml     Labs/Accuchecks:  Recent Labs   Lab 04/06/25  0234 04/06/25  0426 04/06/25  0636 04/06/25  0833 04/07/25  0318   WBC 10.54  --   --  13.99* 13.07*   RBC 4.39  --   --  4.20 4.43   HGB 12.4  --   --  11.9* 12.5   HCT 40.7   < > 39 37.7 38.6     --   --  179 173    < > = values in this interval not displayed.      Recent Labs   Lab 04/06/25  0547 04/07/25  0318   INR   "--  1.0   APTT 22.7  --       Recent Labs     04/07/25  0318      K 4.8   CO2 19*      BUN 33*   CREATININE 1.9*   ALKPHOS 103   ALT 21   AST 32   BILITOT 0.6     No results for input(s): "CPK", "CPKMB", "MB", "TROPONINI" in the last 168 hours.   Recent Labs     04/06/25  0838 04/06/25  1334 04/06/25  2045   PH 7.256* 7.428 7.381   PCO2 50.7* 35.9 40.0   PO2 51 68* 33*   HCO3 22.5* 23.7* 23.7*   POCSATURATED 79 94 63   BE -5* -1 -1     Electrolytes: Contraindicated  Accuchecks: ACHS    Gtts/LDAs:   0.9% NaCl   Intravenous Continuous 10 mL/hr at 04/07/25 0400 Rate Verify at 04/07/25 0400    dexmedeTOMIDine (Precedex) infusion (titrating)  0-1.4 mcg/kg/hr Intravenous Continuous 11.52 mL/hr at 04/07/25 0400 0.7 mcg/kg/hr at 04/07/25 0400    fentanyl  0-250 mcg/hr Intravenous Continuous 10 mL/hr at 04/07/25 0400 100 mcg/hr at 04/07/25 0400    NORepinephrine bitartrate-D5W  0-3 mcg/kg/min Intravenous Continuous 2.5 mL/hr at 04/07/25 0400 0.01 mcg/kg/min at 04/07/25 0400     Lines/Drains/Airways       Central Venous Catheter Line  Duration              Introducer Single Lumen 04/06/25 0230 Internal Jugular Right 1 day    Percutaneous Central Line - Triple Lumen  04/06/25 0811 Internal Jugular Left <1 day              Drain  Duration                  NG/OG Tube 04/06/25 0349 Left mouth 1 day         Urethral Catheter 04/06/25 0645 <1 day              Airway  Duration                  Airway - Non-Surgical 04/06/25 0228 Endotracheal Tube 1 day              Arterial Line  Duration             Arterial Line 04/06/25 0812 Left Radial <1 day              Line  Duration                  Pacer Wires 04/06/25 0256 1 day              Peripheral Intravenous Line  Duration                  Peripheral IV - Single Lumen 04/06/25 0216 18 G Posterior;Right Forearm 1 day         Peripheral IV - Single Lumen 04/06/25 18 G Left Antecubital 1 day         Peripheral IV - Single Lumen 04/06/25 0426 18 G Anterior;Right Hand <1 day "                  Skin/Wounds  Bathing/Skin Care: bath, complete;back care;incontinence care (04/06/25 0701)  Wounds: Yes  Wound care consulted: Yes     Problem: Skin Injury Risk Increased  Goal: Skin Health and Integrity  Outcome: Progressing

## 2025-04-07 NOTE — ASSESSMENT & PLAN NOTE
Ms. Misa Barajas is a 80 yo female with COPD on 2L home O2, HFpEF, AFL s/p RFA (6/17/24 w/ Dr. Church), CKD III, HTN, HLD, chronic venous insufficiency, orthostatic hypotension who was admitted to the hospital for complete heart block and shock. EP consulted for pacemaker evaluation.     Recommendation:   - Patient noted to intermittently be NSR. Continue monitoring with TVP. Threshold 1.5 (0.6 yesterday)  - Will possibly require a pacemaker but will continue to moitor her conduction system for improvement as her acute medical conditions are being treated.   - Hold any AV melissa blocking agents.   - For full recommendations, please see staff attestation.

## 2025-04-07 NOTE — ASSESSMENT & PLAN NOTE
.BMP reviewed- noted Estimated Creatinine Clearance: 19.6 mL/min (A) (based on SCr of 2.1 mg/dL (H)). according to latest data. Based on current GFR, CKD stage is stage 4 - GFR 15-29.  Monitor UOP and serial BMP and adjust therapy as needed. Renally dose meds. Avoid nephrotoxic medications and procedures.  Recent Labs     04/07/25  0019 04/07/25  0203 04/07/25  0318   CREATININE 1.8* 1.8* 1.9*   EGFRNORACEVR 28* 28* 26*     - correcting electrolytes as needed. Unclear if hypercalcemia was secondary to medications but repeating levels

## 2025-04-07 NOTE — SUBJECTIVE & OBJECTIVE
Interval History: Scr remains at 1.9. k 4.8 this morning. 24 UOP 1.2L/24 hours.  Intubated FIO2 21%. Off pressors. On vanc/pip-tazo for UTI/septic shock.  CVP 6-7 overnight.     Review of patient's allergies indicates:   Allergen Reactions    Adhesive tape-silicones     Adhesive Rash     Pt states she removed a LATEX bandaid and the skin beneath was swollen and red. No other latex causes a reaction.     Current Facility-Administered Medications   Medication Frequency    0.9% NaCl infusion Continuous    dexmedetomidine (PRECEDEX) 400mcg/100mL 0.9% NaCL infusion Continuous    fentaNYL 50 mcg/mL injection 50 mcg Q1H PRN    furosemide injection 40 mg Once    heparin (porcine) injection 5,000 Units Q8H    levothyroxine tablet 50 mcg Before breakfast    mupirocin 2 % ointment BID    pantoprazole injection 40 mg Daily    piperacillin-tazobactam (ZOSYN) 4.5 g in D5W 100 mL IVPB (MB+) Q8H    polyethylene glycol packet 17 g BID    senna tablet 8.6 mg Daily    sodium chloride 0.9% flush 10 mL PRN    vancomycin - pharmacy to dose pharmacy to manage frequency    vancomycin 750 mg in 0.9% NaCl 250 mL IVPB (admixture device) Once       Objective:     Vital Signs (Most Recent):  Temp: 97.7 °F (36.5 °C) (04/07/25 0901)  Pulse: 75 (04/07/25 0901)  Resp: 20 (04/07/25 0901)  BP: (!) 137/56 (04/07/25 0719)  SpO2: 95 % (04/07/25 0901) Vital Signs (24h Range):  Temp:  [94.1 °F (34.5 °C)-99 °F (37.2 °C)] 97.7 °F (36.5 °C)  Pulse:  [56-93] 75  Resp:  [20-36] 20  SpO2:  [94 %-99 %] 95 %  BP: ()/(38-59) 137/56  Arterial Line BP: ()/(38-58) 136/58     Weight: 76 kg (167 lb 8.8 oz) (04/07/25 0719)  Body mass index is 28.76 kg/m².  Body surface area is 1.85 meters squared.    I/O last 3 completed shifts:  In: 2302.8 [I.V.:832.2; IV Piggyback:1470.6]  Out: 1290 [Urine:1290]     Physical Exam  Vitals and nursing note reviewed.   Constitutional:       Appearance: She is ill-appearing.   Cardiovascular:      Rate and Rhythm: Normal rate.       Comments: TVP in place  Pulmonary:      Comments: Intubated FIO2 21%  Musculoskeletal:      Right lower leg: No edema.      Left lower leg: No edema.   Skin:     General: Skin is warm.   Neurological:      Comments: sedated          Significant Labs:  CBC:   Recent Labs   Lab 04/07/25 0318   WBC 13.07*   RBC 4.43   HGB 12.5   HCT 38.6      MCV 87   MCH 28.2   MCHC 32.4     CMP:   Recent Labs   Lab 04/07/25 0318   CALCIUM 9.6   ALBUMIN 2.4*      K 4.8   CO2 19*      BUN 33*   CREATININE 1.9*   ALKPHOS 103   ALT 21   AST 32   BILITOT 0.6     All labs within the past 24 hours have been reviewed.

## 2025-04-07 NOTE — PROGRESS NOTES
[facility] - Cardiac Intensive [facility]  Nephrology  Progress Note    Patient Name: Misa Barajas  MRN: [id number]  Admission Date: 4/6/2025  Hospital Length of Stay: 1 days  Attending Provider: Honorio Ortiz MD   Primary Care Physician: Isela Langston MD  Principal Problem:Complete heart block    Subjective:       Interval History: Scr remains at 1.9. k 4.8 this morning. 24 UOP 1.2L/24 hours.  Intubated FIO2 21%. Off pressors. On vanc/pip-tazo for UTI/septic shock.  CVP 6-7 overnight.     Review of patient's allergies indicates:   Allergen Reactions    Adhesive tape-silicones     Adhesive Rash     Pt states she removed a LATEX bandaid and the skin beneath was swollen and red. No other latex causes a reaction.     Current Facility-Administered Medications   Medication Frequency    0.9% NaCl infusion Continuous    dexmedetomidine (PRECEDEX) 400mcg/100mL 0.9% NaCL infusion Continuous    fentaNYL 50 mcg/mL injection 50 mcg Q1H PRN    furosemide injection 40 mg Once    heparin (porcine) injection 5,000 Units Q8H    levothyroxine tablet 50 mcg Before breakfast    mupirocin 2 % ointment BID    pantoprazole injection 40 mg Daily    piperacillin-tazobactam (ZOSYN) 4.5 g in D5W 100 mL IVPB (MB+) Q8H    polyethylene glycol packet 17 g BID    senna tablet 8.6 mg Daily    sodium chloride 0.9% flush 10 mL PRN    vancomycin - pharmacy to dose pharmacy to manage frequency    vancomycin 750 mg in 0.9% NaCl 250 mL IVPB (admixture device) Once       Objective:     Vital Signs (Most Recent):  Temp: 97.7 °F (36.5 °C) (04/07/25 0901)  Pulse: 75 (04/07/25 0901)  Resp: 20 (04/07/25 0901)  BP: (!) 137/56 (04/07/25 0719)  SpO2: 95 % (04/07/25 0901) Vital Signs (24h Range):  Temp:  [94.1 °F (34.5 °C)-99 °F (37.2 °C)] 97.7 °F (36.5 °C)  Pulse:  [56-93] 75  Resp:  [20-36] 20  SpO2:  [94 %-99 %] 95 %  BP: ()/(38-59) 137/56  Arterial Line BP: ()/(38-58) 136/58     Weight: 76 kg (167 lb 8.8 oz) (04/07/25 7995)  Body mass index  is 28.76 kg/m².  Body surface area is 1.85 meters squared.    I/O last 3 completed shifts:  In: 2302.8 [I.V.:832.2; IV Piggyback:1470.6]  Out: 1290 [Urine:1290]     Physical Exam  Vitals and nursing note reviewed.   Constitutional:       Appearance: She is ill-appearing.   Cardiovascular:      Rate and Rhythm: Normal rate.      Comments: TVP in place  Pulmonary:      Comments: Intubated FIO2 21%  Musculoskeletal:      Right lower leg: No edema.      Left lower leg: No edema.   Skin:     General: Skin is warm.   Neurological:      Comments: sedated          Significant Labs:  CBC:   Recent Labs   Lab 04/07/25  0318   WBC 13.07*   RBC 4.43   HGB 12.5   HCT 38.6      MCV 87   MCH 28.2   MCHC 32.4     CMP:   Recent Labs   Lab 04/07/25 0318   CALCIUM 9.6   ALBUMIN 2.4*      K 4.8   CO2 19*      BUN 33*   CREATININE 1.9*   ALKPHOS 103   ALT 21   AST 32   BILITOT 0.6     All labs within the past 24 hours have been reviewed.         Assessment/Plan:     Cardiac/Vascular  * Complete heart block  Per primary     Renal/  MARQUEZ (acute kidney injury)  Misa Barajas is a 79 y.o. female ,is admitted on 4/6/2025  with past medical history of   COPD on 2L home O2, HFpEF, AFL s/p RFA (6/17/24 w/ Dr. Church), CKD III, HTN, HLD, chronic venous insufficiency, orthostatic hypotension.     Admitted with bradycardia and hypotension, requiring pacing and pressors  Nephrology  is consulted for MARQUEZ on CKD     Impression:  MARQUEZ KDIGO stage 2 on CKD 3    Baseline Creatinine: 1.2-1.4  Creatinine at time of consult: 2.1  Tests done: UA neg  Etiology of MARQUEZ: Likely cause: pre-renal - hypotension, bradycardia     Hyperkalemia         Recommendations :     -No urgent indication for RRT. 24 UOP 1.3L. Electrolytes stable.   -Lasix as needed   -Monitor serum chemistries daily, strict intake and output Qshift , daily weights if able.  -Renal protective measures: Please adjust medications for reduced clearance  -Avoid nephrotoxic  medications (NSAID, IV contrast)  -Avoid ACEi and ARBs in the setting of MARQUEZ  -Maintain MAP > 65  -Transfuse for Hb <7        Thank you for your consult. I will follow-up with patient. Please contact us if you have any additional questions.    Denisse Domingo DNP  Nephrology  [facility] - Cardiac [facility]

## 2025-04-07 NOTE — PLAN OF CARE
CICU DAILY GOALS   Cvp 7/7/8  Svo2 54  Attempted to be on spontaneous multiple times and became apneic    A: Awake    RASS: Goal -    Actual - RASS (Manjarrez Agitation-Sedation Scale): alert and calm   Restraint necessity: Clinical Justification: Removing medical devices  B: Breath   SBT: Not intubated   C: Coordinate A & B, analgesics/sedatives   Pain: managed    SAT: Not intubated  D: Delirium   CAM-ICU:    E: Early(intubated/ Progressive (non-intubated) Mobility   MOVE Screen: Pass   Activity: Activity Management: Patient unable to perform activities  FAS: Feeding/Nutrition   Diet order: Diet/Nutrition Received: NPO,   Fluid restriction:     Nutritional Supplement Intake: Quantity 0, Type:  0  T: Thrombus   DVT prophylaxis: VTE Core Measure: Pharmacological prophylaxis initiated/maintained  H: HOB Elevation   Head of Bed (HOB) Positioning: HOB at 20-30 degrees  U: Ulcer Prophylaxis   GI: yes  G: Glucose control   managed    S: Skin   Bath completed: 04/07/2025  Wounds: Yes  Wound care consulted: yes  B: Bowel Function   no issues   I: Indwelling Catheters   Muniz necessity:      Urethral Catheter 04/06/25 0645-Reason for Continuing Urinary Catheterization: Critically ill in ICU and requiring hourly monitoring of intake/output   CVC necessity: Yes  D: De-escalation Antibx   No  Plan for the day   -  Family/Goals of care/Code Status   Code Status: Full Code     No acute events throughout day, VS and assessment per flow sheet, patient progressing towards goals as tolerated, plan of care reviewed with Misa Barajas and family, all concerns addressed, will continue to monitor.

## 2025-04-07 NOTE — PLAN OF CARE
[facility] - Card[facility]  Initial Discharge Assessment       Primary Care Provider: Isela Langston MD    Admission Diagnosis: Bradycardia [R00.1]  Encounter for central line placement [Z45.2]  Encounter for intubation [Z01.818]    Admission Date: 4/6/2025  Expected Discharge Date: 4/11/2025    Transition of Care Barriers: None    Payor: [employer] MEDICARE ADVANTAGE / Plan: [employer] MCARE ADV / Product Type: Medicare Advantage /     Extended Emergency Contact Information  Primary Emergency Contact: Esther Barajas   [place]  Mobile Phone: [phone]  Relation: Daughter   needed? No  Secondary Emergency Contact: Marina Gruber  Address: [address] [place] [address]  Mobile Phone: [phone]  Relation: Daughter    Discharge Plan A: Home Health  Discharge Plan B: Skilled Nursing Facility      [facility]  [address]  [place] [address]  Phone: [phone] Fax: [phone]    [facility]  [address] [facility]  [place] [address]  Phone: [phone] Fax: [phone]    [id number] [place] [facility] [address]  AT [facility] [address]  [address] [place] [address]  Phone: [phone] Fax: [phone]      Initial Assessment (most recent)       Adult Discharge Assessment - 04/07/25 1429          Discharge Assessment    Assessment Type Discharge Planning Assessment     Confirmed/corrected address, phone number and insurance Yes     Confirmed Demographics Correct on Facesheet     Source of Information family;health record     If unable to respond/provide information was family/caregiver contacted? Yes     Contact Name/Number Esther Barajas (daughter) [phone], Marina Gruber (daughter) [phone]     Communicated NOHEMI with patient/caregiver Date not available/Unable to determine     People in Home alone     Do you have help at home or someone to  help you manage your care at home? Yes     Who are your caregiver(s) and their phone number(s)? Esther Barajas (daughter) [phone], Marina Gruber (daughter) [phone]     Prior to hospitilization cognitive status: Unable to Assess     Current cognitive status: Unable to Assess     Walking or Climbing Stairs Difficulty yes     Walking or Climbing Stairs ambulation difficulty, requires equipment     Mobility Management walker/wheelchair     Dressing/Bathing Difficulty no     Home Layout Able to live on 1st floor     Equipment Currently Used at Home walker, rolling;wheelchair;shower chair;bedside commode;oxygen;grab bar     Readmission within 30 days? No     Patient currently being followed by outpatient case management? No     Do you currently have service(s) that help you manage your care at home? Yes     Name and Contact number of agency [facility]     Is the pt/caregiver preference to resume services with current agency Yes     Do you take prescription medications? Yes     Do you have prescription coverage? Yes     Do you have any problems affording any of your prescribed medications? No     Is the patient taking medications as prescribed? yes     Who is going to help you get home at discharge? Esther Barajas (daughter) [phone], Marina Gruber (daughter) [phone]     How do you get to doctors appointments? family or friend will provide     Are you on dialysis? No     Do you take coumadin? No     Discharge Plan A Home Health     Discharge Plan B Skilled Nursing Facility     DME Needed Upon Discharge  none     Discharge Plan discussed with: Adult children     Transition of Care Barriers None                   SW met with pt's daughters Esther and Krystyna to discuss discharge planning (pt was intubated).  Pt lives alone in a one-story house uses a walker, wheelchair, shower chair, bedside commode, O2, and grab bars.  Pt is current with [facility].  No dialysis or coumadin.  PCP is Dr Langston.  Pt will have assistance from  daughters at discharge.  Discharge Plan A and Plan B have been determined by review of patient's clinical status, future medical and therapeutic needs, and coverage/benefits for post-acute care in coordination with multidisciplinary team members.  SW name and ext on whiteboard; discharge planning booklet provided.  Will continue to follow.      Karey Castelan LMSW  [facility]  [id number]

## 2025-04-07 NOTE — HOSPITAL COURSE
Admitted to CCU for CHB in s/o hyperkalemia & in the presence of conduction disease/RBBB & LAHB - stable w/ temporary wire. Septic shock d/t UTI; vasopressors weaned off. HTN; started NG gtt. Acute on chronic hypoxic/hypercapnic RF & obtunded; intubated. Successfully extubated 04/08. MARQUEZ on CKD; improved w/ good UOP response to diuresis. PVCs w/ intermittent pacing by TVP. Switched pip-tazo to amox-clav on 04/09; continued to improve; empiric EED 04/13 for 7 day course. Micra PM by EP placed 04/10. Medically stable for stepdown for placement (very weak).

## 2025-04-07 NOTE — PROGRESS NOTES
Pharmacokinetic Assessment Follow Up: IV Vancomycin    Vancomycin serum concentration assessment(s):    Vanc R = 15.3 mcg/mL, drawn 24 hours after first dose. MARQUEZ    Vancomycin Regimen Plan:  Vancomycin 750 mg IV x 1  Obtain concentration in AM 4/8  Goal trough = 15-20 mcg/mL    Thank you for the consult, will continue to follow    Akbar Govea.D., BCPS  [id number]         Patient brief summary:  Misa Barajas is a 79 y.o. female initiated on antimicrobial therapy with IV Vancomycin for treatment of sepsis    Drug levels (last 3 results):  Recent Labs   Lab Result Units 04/07/25  0318   Vancomycin Random ug/ml 15.3     Drug Allergies:   Review of patient's allergies indicates:   Allergen Reactions    Adhesive tape-silicones     Adhesive Rash     Pt states she removed a LATEX bandaid and the skin beneath was swollen and red. No other latex causes a reaction.       Actual Body Weight:   76 kg    Renal Function:   Estimated Creatinine Clearance: 23.2 mL/min (A) (based on SCr of 1.9 mg/dL (H)).,     CBC (last 72 hours):  Recent Labs   Lab Result Units 04/06/25  0234 04/06/25  0833 04/07/25  0318   WBC K/uL 10.54 13.99* 13.07*   HGB gm/dL 12.4 11.9* 12.5   HCT % 40.7 37.7 38.6   Platelet Count K/uL 224 179 173   Lymph % % 34.3 4.9* 8.3*   Mono % % 9.2 8.8 8.4   Eos % % 4.1 0.1 1.0   Basophil % % 1.1 0.4 0.4       Metabolic Panel (last 72 hours):  Recent Labs   Lab Result Units 04/06/25  0234 04/06/25  0547 04/06/25  0833 04/06/25  1131 04/06/25  1533 04/06/25  1544 04/06/25  2043 04/07/25  0019 04/07/25  0203 04/07/25  0318   Sodium mmol/L 136  --  137 140 141  --  139 138 138 138   Potassium mmol/L 6.0*  --  4.3 5.2* 5.3*  --  5.8* 4.7 4.8 4.8   Chloride mmol/L 111*  --  109 110 112*  --  112* 110 109 110   CO2 mmol/L 19*  --  18* 18* 19*  --  18* 18* 18* 19*   Glucose mg/dL 209*  --  175* 140* 130*  --  136* 157* 156* 149*   Glucose, UA   --  Negative  --   --   --   --   --   --   --   --    BUN mg/dL 29*  --  31*  normal mood with appropriate affect 32* 31*  --  31* 33* 32* 33*   Creatinine mg/dL 2.1*  --  1.9* 1.9* 1.8*  --  1.8* 1.8* 1.8* 1.9*   Urine Creatinine mg/dL  --   --   --   --   --  93.0  --   --   --   --    Albumin g/dL 2.8*  --   --   --   --   --   --   --   --  2.4*   Bilirubin Total mg/dL 0.4  --   --   --   --   --   --   --   --  0.6   ALP unit/L 139  --   --   --   --   --   --   --   --  103   AST unit/L 46*  --   --   --   --   --   --   --   --  32   ALT unit/L 28  --   --   --   --   --   --   --   --  21   Magnesium  mg/dL 2.2  --  2.0  --   --   --   --   --   --  1.9   Phosphorus Level mg/dL  --   --  3.8  --   --   --   --   --   --  4.5       Vancomycin Administrations:  vancomycin given in the last 96 hours                     vancomycin 750 mg in 0.9% NaCl 250 mL IVPB (admixture device) (mg) 750 mg New Bag 04/07/25 0933    vancomycin 1,250 mg in 0.9% NaCl 250 mL IVPB (admixture device) (mg) 1,250 mg New Bag 04/06/25 0524                    Microbiologic Results:  Microbiology Results (last 7 days)       Procedure Component Value Units Date/Time    Blood culture [[id number]]  (Normal) Collected: 04/06/25 0432    Order Status: Completed Specimen: Blood from Peripheral, Wrist, Left Updated: 04/07/25 0800     Blood Culture No Growth After 24 Hours    Blood culture [[id number]]  (Normal) Collected: 04/06/25 0432    Order Status: Completed Specimen: Blood from Peripheral, Wrist, Left Updated: 04/07/25 0800     Blood Culture No Growth After 24 Hours

## 2025-04-07 NOTE — SUBJECTIVE & OBJECTIVE
Interval History: NAEON. Remains intubated. Patient remain mostly in NSR w/ only 2 instances of requiring pacing for a few beats in the past 24hrs.     ROS 12 point ROS negative except for HPI  Objective:     Vital Signs (Most Recent):  Temp: 97.7 °F (36.5 °C) (04/07/25 0901)  Pulse: 75 (04/07/25 0901)  Resp: 20 (04/07/25 0901)  BP: (!) 137/56 (04/07/25 0719)  SpO2: 95 % (04/07/25 0901) Vital Signs (24h Range):  Temp:  [93.6 °F (34.2 °C)-99 °F (37.2 °C)] 97.7 °F (36.5 °C)  Pulse:  [56-93] 75  Resp:  [20-36] 20  SpO2:  [94 %-99 %] 95 %  BP: ()/(38-59) 137/56  Arterial Line BP: ()/(38-58) 136/58     Weight: 76 kg (167 lb 8.8 oz)  Body mass index is 28.76 kg/m².     SpO2: 95 %        Physical Exam     Constitutional:       Appearance: She is ill-appearing.      Comments: Intubated and sedated   HENT:      Head: Normocephalic and atraumatic.      Mouth/Throat:      Mouth: Mucous membranes are moist.   Eyes:      General: No scleral icterus.  Cardiovascular:      Pulses:           Carotid pulses are 2+ on the right side and 2+ on the left side.       Radial pulses are 2+ on the right side and 2+ on the left side.        Dorsalis pedis pulses are 1+ on the right side and 1+ on the left side.        Posterior tibial pulses are 1+ on the right side and 1+ on the left side.      Heart sounds: No murmur heard.     Comments: Paced rhythm  Pulmonary:      Comments: On mechanical support  Abdominal:      General: Bowel sounds are normal. There is distension.   Musculoskeletal:      Right lower leg: Edema present.      Left lower leg: Edema present.      Comments: +1 pitting edema, venous stasis   Skin:     Coloration: Skin is pale.      Comments: cold   Neurological:      Comments: Unable to assess as sedated   Significant Labs: All pertinent lab results from the last 24 hours have been reviewed.    Significant Imaging: Echocardiogram: Transthoracic echo (TTE) complete (Cupid Only):   Results for orders placed or  performed during the hospital encounter of 04/06/25   Echo   Result Value Ref Range    LV Diastolic Volume 69 mL    Echo EF Estimated 60 %    LV Systolic Volume 28 mL    IVS 0.8 0.6 - 1.1 cm    LVIDd 4.0 3.5 - 6.0 cm    LVIDs 2.7 2.1 - 4.0 cm    LVOT diameter 1.9 cm    PW 0.9 0.6 - 1.1 cm    AV LVOT peak gradient 3 mmHg    LVOT mn grad 2 mmHg    LVOT peak norberto 0.9 m/s    LVOT peak VTI 16.0 cm    RV- razo basal diam 2.5 cm    LA size 3.9 cm    Left Atrium Major Axis 4.3 cm    Left Atrium Minor Axis 4.0 cm    LA Vol (MOD) 32 mL    RA Major [place] 4.15 cm    AV valve area 1.8 cm2    AV area by cont VTI 1.8 cm2    AV peak gradient 7 mmHg    AV mean gradient 4 mmHg    Ao peak norberto 1.3 m/s    Ao VTI 24.8 cm    MV Peak A Norberto 1.19 m/s    E wave deceleration time 330 ms    MV Peak E Norberto 0.77 m/s    E/A ratio 0.65     LV LATERAL E/E' RATIO 25.7     LV SEPTAL E/E' RATIO 15.4     TDI LATERAL 0.03 m/s    TDI SEPTAL 0.05 m/s    MV peak gradient 5 mmHg    MV mean gradient 2 mmHg    TV peak gradient 22 mmHg    TR Max Norberto 2.3 m/s    Ascending aorta 2.61 cm    STJ 2.30 cm    Sinus 2.95 cm    LA WIDTH 2.4 cm    RA Width 3.25 cm    TAPSE 1.53 cm    BSA 1.85 m2    LVOT stroke volume 45.3 cm3    LV Systolic Volume Index 15.5 mL/m2    LV Diastolic Volume Index 38.12 mL/m2    LVOT area 2.8 cm2    FS 32.5 28 - 44 %    Left Ventricle Relative Wall Thickness 0.45 cm    LV mass 101.4 g    LV Mass Index 56.0 g/m2    E/E' ratio 19 m/s    TOMÁS 18 mL/m2    LA Vol 33 cm3    RV/LV Ratio 0.63 cm    TOMÁS (MOD) 18 mL/m2    AV Velocity Ratio 0.69     AV index (prosthetic) 0.65     KHOI by Velocity Ratio 2.0 cm²    Mean e' 0.04 m/s    ZLVIDS -1.08     ZLVIDD -2.25     Narrative      Left Ventricle: The left ventricle is normal in size. Normal wall   thickness. There is low normal systolic function with a visually estimated   ejection fraction of 50 - 55%. Grade II diastolic dysfunction.    Right Ventricle: The right ventricle is normal in size. Wall thickness    is normal. Systolic function is normal.    IVC/SVC: Patient is ventilated, cannot use IVC diameter to estimate   right atrial pressure.

## 2025-04-07 NOTE — ASSESSMENT & PLAN NOTE
Patient with Hypoxic Respiratory failure which is Chronic.  she is on home oxygen at 2 LPM. Supplemental oxygen was provided and noted- Vent Mode: A/C  Oxygen Concentration (%):  [21-30] 21  Resp Rate Total:  [20 br/min-35 br/min] 20 br/min  Vt Set:  [430 mL] 430 mL  PEEP/CPAP:  [5 cmH20] 5 cmH20  Mean Airway Pressure:  [8.8 duQ06-92 cmH20] 9.6 cmH20    .   Signs/symptoms of respiratory failure include- tachypnea, increased work of breathing, use of accessory muscles, and lethargy. Contributing diagnoses includes - underlying COPD

## 2025-04-07 NOTE — ASSESSMENT & PLAN NOTE
Misa Barajas is a 79 y.o. female ,is admitted on 4/6/2025  with past medical history of   COPD on 2L home O2, HFpEF, AFL s/p RFA (6/17/24 w/ Dr. Church), CKD III, HTN, HLD, chronic venous insufficiency, orthostatic hypotension.     Admitted with bradycardia and hypotension, requiring pacing and pressors  Nephrology  is consulted for MARQUEZ on CKD     Impression:  MARQUEZ KDIGO stage 2 on CKD 3    Baseline Creatinine: 1.2-1.4  Creatinine at time of consult: 2.1  Tests done: UA neg  Etiology of MARQUEZ: Likely cause: pre-renal - hypotension, bradycardia     Hyperkalemia         Recommendations :     -No urgent indication for RRT. 24 UOP 1.3L. Electrolytes stable.   -Lasix as needed   -Monitor serum chemistries daily, strict intake and output Qshift , daily weights if able.  -Renal protective measures: Please adjust medications for reduced clearance  -Avoid nephrotoxic medications (NSAID, IV contrast)  -Avoid ACEi and ARBs in the setting of MARQUEZ  -Maintain MAP > 65  -Transfuse for Hb <7

## 2025-04-07 NOTE — ASSESSMENT & PLAN NOTE
Most recent BNP and echo results are listed below.  Recent Labs     04/06/25  0234   *     Latest ECHO  Results for orders placed during the hospital encounter of 01/29/25    Echo    Interpretation Summary    Left Ventricle: The left ventricle is normal in size. Normal wall thickness. Normal wall motion. There is normal systolic function. Ejection fraction is approximately 55%.    Right Ventricle: Normal right ventricular cavity size. Wall thickness is normal. Systolic function is borderline low.    Left Atrium: Left atrium is severely dilated.    Mitral Valve: There is moderate posterior mitral annular calcification present.    Tricuspid Valve: There is mild regurgitation.    Pulmonary Artery: The estimated pulmonary artery systolic pressure is 51 mmHg.    IVC/SVC: Elevated venous pressure at 15 mmHg.    Current Heart Failure Medications: have been held due to bradycardia and MARQUEZ  NORepinephrine 4 mg in sodium chloride 0.9% 250 mL infusion (premix), Continuous, Intravenous    Plan  - Monitor strict I&Os and daily weights.    - Place on telemetry  - repeating official echo, bedside echo appears to have normal LVEF, IVC cannot be assessed as patient intubated   show

## 2025-04-07 NOTE — ASSESSMENT & PLAN NOTE
Ms. Misa Barajas is a 80 yo female with COPD on 2L home O2, HFpEF, AFL s/p RFA (6/17/24 w/ Dr. Church), CKD III, HTN, HLD, chronic venous insufficiency, orthostatic hypotension who was brought via EMS after per history she was found confused and bradycardiac with HR in the 20s.They started epi and transcutaneous pacing. They had to bag her in route. We do not have strip of underlying rhythm. Patient obtunded for which she was intubated on arrival to the ED and transcutaneous pacing was exchanged to transvenous pacing via RIJ. Underlying rate 44 bifascicular block. Known RBBB.    -Unclear etiology ddx include sepsis, mesenteric ischemia?  - elevated lactic acid. Awaiting CT h/c/a/p  - bradycardia most likely in the setting of electrolyte disturbance and abnormal thyroid function  - correcting electrolyte disturbance. Suspect most likely she will then not require tvp.   - obtaining bcx and ucx. Broad spectrum abx ppx and deescalating as needed.   - orosco placement

## 2025-04-07 NOTE — CARE UPDATE
Cardiology Care Update Note:    Hemodynamics@8pm:  CVP: 7  SVO2: 63  CO: 5.14  CI: 2.79  SVR: 902      Continuous Infusions:   0.9% NaCl   Intravenous Continuous 10 mL/hr at 04/06/25 2100 Rate Verify at 04/06/25 2100    dexmedeTOMIDine (Precedex) infusion (titrating)  0-1.4 mcg/kg/hr Intravenous Continuous 11.52 mL/hr at 04/06/25 2100 0.7 mcg/kg/hr at 04/06/25 2100    fentanyl  0-250 mcg/hr Intravenous Continuous 10 mL/hr at 04/06/25 2100 100 mcg/hr at 04/06/25 2100    NORepinephrine bitartrate-D5W  0-3 mcg/kg/min Intravenous Continuous 2.5 mL/hr at 04/06/25 2100 0.01 mcg/kg/min at 04/06/25 2100       Intake/Output Summary (Last 24 hours) at 4/6/2025 2211  Last data filed at 4/6/2025 2100  Gross per 24 hour   Intake 1928.26 ml   Output 375 ml   Net 1553.26 ml       Plan:  - Lasix 40mg IV x1   - Monitor UOP  - Hyperkalemic, gave Lasix, albuterol, and Lokelma   - ECG reviewed  - Trending lactic and K    Discussed with Cardiology Attending.    CAROLINE Forte  Cardiovascular Disease fellow, PGY-4  [facility]

## 2025-04-08 NOTE — PT/OT/SLP EVAL
Speech Language Pathology Evaluation  Bedside Swallow    Patient Name:  Misa Barajas   MRN:  [id number]  Admitting Diagnosis: Complete heart block    Recommendations:                 General Recommendations:  Dysphagia therapy  Diet recommendations:  NPO, NPO   Aspiration Precautions: Ice chips sparingly, Meds crushed in puree, and Strict aspiration precautions   General Precautions: Standard, aspiration, NPO  Communication strategies:  none    Assessment:     Misa Barajas is a 79 y.o. female with an SLP diagnosis of Dysphagia.     History:     Past Medical History:   Diagnosis Date    Acute biliary pancreatitis 07/27/2024    Anemia 07/12/2024    Aortic atherosclerosis     Arthritis     knee joint pain    Asthma-COPD overlap syndrome 08/22/2022    home o2    Breast cancer 2002    left breast & lymph nodes-s/p sx with chemo    Cataracts, bilateral     Chronic diastolic heart failure 12/24/2019    Chronic kidney disease, stage 3     Class 1 obesity due to excess calories with serious comorbidity and body mass index (BMI) of 30.0 to 30.9 in adult 05/16/2024    History of chemotherapy     last treatment 12/2002 (had 8 treatments)    Hyperlipidemia 11/20/2016    Hypertension     Lymphedema of both lower extremities 01/25/2022    Nephrolithiasis 06/02/2014    Osteoporosis     Paroxysmal atrial fibrillation 06/07/2021    Renal osteodystrophy 01/14/2016    Shock 4/6/2025    Subclinical hypothyroidism 4/6/2025    Venous stasis dermatitis of both lower extremities 01/25/2022    Vitamin D insufficiency        Past Surgical History:   Procedure Laterality Date    ABLATION, ATRIAL FLUTTER, TYPICAL N/A 6/17/2024    Procedure: Ablation, Atrial Flutter, Typical;  Surgeon: Taz Church MD;  Location: [facility];  Service: Cardiology;  Laterality: N/A;  AFL, RFA, LORENE, MAKAYLA (cx if SR), LEANNE miller, 3 Prep    BREAST BIOPSY Left 2002    core bx, +    BREAST BIOPSY Right 2018    core    BREAST BIOPSY Right 2019    BREAST  LUMPECTOMY Left 2002    CYSTOSCOPY  4/28/2022    Procedure: CYSTOSCOPY;  Surgeon: Vik Ware MD;  Location: [facility] OR [facility] FLR;  Service: Urology;;    CYSTOSCOPY  5/30/2022    Procedure: CYSTOSCOPY;  Surgeon: Bonnie Knutson MD;  Location: [facility] OR [facility] FLR;  Service: Urology;;    ECHOCARDIOGRAM,TRANSESOPHAGEAL N/A 6/17/2024    Procedure: Transesophageal echo (MAKAYLA) intra-procedure log documentation;  Surgeon: Nestor Martinez MD;  Location: [facility] EP LAB;  Service: Cardiology;  Laterality: N/A;    ERCP N/A 7/30/2024    Procedure: ERCP (ENDOSCOPIC RETROGRADE CHOLANGIOPANCREATOGRAPHY);  Surgeon: Sierra Cotto MD;  Location: [facility] ENDO (2ND FLR);  Service: Endoscopy;  Laterality: N/A;    LASER LITHOTRIPSY  5/30/2022    Procedure: LITHOTRIPSY, USING LASER;  Surgeon: Bonnie Knutson MD;  [facility];  Service: Urology;;    LITHOTRIPSY      MASTECTOMY Left 06/2002    left-& lymph node dissection    REPLACEMENT OF STENT Right 5/30/2022    Procedure: REPLACEMENT, STENT;  Surgeon: Bonnie Knutson MD;  [facility];  Service: Urology;  Laterality: Right;    RETROGRADE PYELOGRAPHY Right 4/28/2022    Procedure: PYELOGRAM, RETROGRADE;  Surgeon: Vik Ware MD;  [facility];  Service: Urology;  Laterality: Right;    ROBOT-ASSISTED LAPAROSCOPIC PARTIAL NEPHRECTOMY USING DA JESÚS XI Right 3/11/2021    Procedure: XI ROBOTIC NEPHRECTOMY, PARTIAL;  Surgeon: Taz Ortega MD;  Location: [facility] OR 2ND FLR;  Service: Urology;  Laterality: Right;  4hr/ gen with regional  Counts include 234 beds at the [facility] confirmation:  [id number] for 11:15am case NC    URETEROSCOPIC REMOVAL OF URETERIC CALCULUS Right 5/30/2022    Procedure: REMOVAL, CALCULUS, URETER, URETEROSCOPIC;  Surgeon: Bonnie Knutson MD;  Location: [facility] OR 1ST FLR;  Service: Urology;  Laterality: Right;    ureteroscopy Bilateral     6.14    URETEROSCOPY Right 5/30/2022    Procedure: URETEROSCOPY;  Surgeon: Bonnie RECINOS  MD Eamon;  Location: [facility] OR [address] [place] [address];  Service: Urology;  Laterality: Right;       Prior Intubation HX:  4/6-4/8    Chest X-Rays: 4/8 Support devices remain and appear unchanged compared to previous performed 1 day prior. There is no pneumothorax. The remainder of the examination appears unchanged.     Prior diet: regular/thin      Subjective     Awake/alert    Pain/Comfort:  Pain Rating 1: 0/10  Pain Rating Post-Intervention 1: 0/10    Respiratory Status: Room air    Objective:     Oral Musculature Evaluation  Oral Musculature: WFL  Dentition: present and adequate  Oral Labial Strength and Mobility: WFL  Lingual Strength and Mobility: WFL  Volitional Cough: decreased strength  Voice Prior to PO Intake: hoarse    Bedside Swallow Eval:   Consistencies Assessed:  Thin liquids x3    Puree x2    Oral Phase:   WFL    Pharyngeal Phase:   coughing/choking with 2/3 thin liquid trials  no overt clinical signs/symptoms of aspiration  multiple spontaneous swallows across puree      Goals:   Multidisciplinary Problems       SLP Goals          Problem: SLP    Goal Priority Disciplines Outcome   SLP Goal     SLP    Description: Speech Language Pathology Goals  Goals expected to be met by 4/17    1. Pt will participate in ongoing swallow assessment                                Plan:     Patient to be seen:  4 x/week   Plan of Care reviewed with:  patient   SLP Follow-Up:  Yes       Discharge recommendations:    TBD  Time Tracking:     SLP Treatment Date:   04/08/25  Speech Start Time:  1445  Speech Stop Time:  1453     Speech Total Time (min):  8 min    Billable Minutes: Eval Swallow and Oral Function 8    04/08/2025

## 2025-04-08 NOTE — PROGRESS NOTES
[facility] - Card[facility] Int[facility]  Cardiac Electrophysiology  Progress Note    Admission Date: 4/6/2025  Code Status: Full Code   Attending Physician: Honorio Ortiz MD   Expected Discharge Date: 4/11/2025  Principal Problem:Complete heart block    Subjective:     Interval History: NAEON. Remains intubated. Patient remain mostly in NSR w/ intermittent pacing for CHB.     ROS 12 point ROS negative except for HPI  Objective:     Vital Signs (Most Recent):  Temp: 98.4 °F (36.9 °C) (04/08/25 0734)  Pulse: 74 (04/08/25 0734)  Resp: (!) 21 (04/08/25 0734)  BP: 135/60 (04/08/25 0300)  SpO2: (!) 94 % (04/08/25 0734) Vital Signs (24h Range):  Temp:  [95.5 °F (35.3 °C)-98.4 °F (36.9 °C)] 98.4 °F (36.9 °C)  Pulse:  [60-75] 74  Resp:  [8-33] 21  SpO2:  [89 %-100 %] 94 %  BP: (112-176)/(54-66) 135/60  Arterial Line BP: ()/(41-76) 151/63     Weight: 76 kg (167 lb 8.8 oz)  Body mass index is 28.76 kg/m².     SpO2: (!) 94 %        Physical Exam     Constitutional:       Appearance: She is ill-appearing.      Comments: Intubated and sedated   HENT:      Head: Normocephalic and atraumatic.      Mouth/Throat:      Mouth: Mucous membranes are moist.   Eyes:      General: No scleral icterus.  Cardiovascular:      Pulses:           Carotid pulses are 2+ on the right side and 2+ on the left side.       Radial pulses are 2+ on the right side and 2+ on the left side.        Dorsalis pedis pulses are 1+ on the right side and 1+ on the left side.        Posterior tibial pulses are 1+ on the right side and 1+ on the left side.      Heart sounds: No murmur heard.     Comments: Paced rhythm  Pulmonary:      Comments: On mechanical support  Abdominal:      General: Bowel sounds are normal. There is distension.   Musculoskeletal:      Right lower leg: Edema present.      Left lower leg: Edema present.      Comments: +1 pitting edema, venous stasis   Skin:     Coloration: Skin is pale.      Comments: cold   Neurological:      Comments:  Unable to assess as sedated   Significant Labs: All pertinent lab results from the last 24 hours have been reviewed.    Significant Imaging: Echocardiogram: Transthoracic echo (TTE) complete (Cupid Only):   Results for orders placed or performed during the hospital encounter of 04/06/25   Echo   Result Value Ref Range    LV Diastolic Volume 69 mL    Echo EF Estimated 60 %    LV Systolic Volume 28 mL    IVS 0.8 0.6 - 1.1 cm    LVIDd 4.0 3.5 - 6.0 cm    LVIDs 2.7 2.1 - 4.0 cm    LVOT diameter 1.9 cm    PW 0.9 0.6 - 1.1 cm    AV LVOT peak gradient 3 mmHg    LVOT mn grad 2 mmHg    LVOT peak norberto 0.9 m/s    LVOT peak VTI 16.0 cm    RV- razo basal diam 2.5 cm    LA size 3.9 cm    Left Atrium Major Axis 4.3 cm    Left Atrium Minor Axis 4.0 cm    LA Vol (MOD) 32 mL    RA Major [place] 4.15 cm    AV valve area 1.8 cm2    AV area by cont VTI 1.8 cm2    AV peak gradient 7 mmHg    AV mean gradient 4 mmHg    Ao peak norberto 1.3 m/s    Ao VTI 24.8 cm    MV Peak A Norberto 1.19 m/s    E wave deceleration time 330 ms    MV Peak E Norberto 0.77 m/s    E/A ratio 0.65     LV LATERAL E/E' RATIO 25.7     LV SEPTAL E/E' RATIO 15.4     TDI LATERAL 0.03 m/s    TDI SEPTAL 0.05 m/s    MV peak gradient 5 mmHg    MV mean gradient 2 mmHg    TV peak gradient 22 mmHg    TR Max Norberto 2.3 m/s    Ascending aorta 2.61 cm    STJ 2.30 cm    Sinus 2.95 cm    LA WIDTH 2.4 cm    RA Width 3.25 cm    TAPSE 1.53 cm    BSA 1.85 m2    LVOT stroke volume 45.3 cm3    LV Systolic Volume Index 15.5 mL/m2    LV Diastolic Volume Index 38.12 mL/m2    LVOT area 2.8 cm2    FS 32.5 28 - 44 %    Left Ventricle Relative Wall Thickness 0.45 cm    LV mass 101.4 g    LV Mass Index 56.0 g/m2    E/E' ratio 19 m/s    TOMÁS 18 mL/m2    LA Vol 33 cm3    RV/LV Ratio 0.63 cm    TOMÁS (MOD) 18 mL/m2    AV Velocity Ratio 0.69     AV index (prosthetic) 0.65     KHOI by Velocity Ratio 2.0 cm²    Mean e' 0.04 m/s    ZLVIDS -1.08     ZLVIDD -2.25     Narrative      Left Ventricle: The left ventricle is normal  in size. Normal wall   thickness. There is low normal systolic function with a visually estimated   ejection fraction of 50 - 55%. Grade II diastolic dysfunction.    Right Ventricle: The right ventricle is normal in size. Wall thickness   is normal. Systolic function is normal.    IVC/SVC: Patient is ventilated, cannot use IVC diameter to estimate   right atrial pressure.       Assessment and Plan:     * Complete heart block  Ms. Misa Barajas is a 78 yo female with COPD on 2L home O2, HFpEF, AFL s/p RFA (6/17/24 w/ Dr. Church), CKD III, HTN, HLD, chronic venous insufficiency, orthostatic hypotension who was admitted to the hospital for complete heart block and shock. EP consulted for pacemaker evaluation.     Recommendation:   - Patient noted to intermittently be NSR. Continue monitoring with TVP. Threshold 0.8 (1.5 yesterday)  - Will most likely require a pacemaker but will continue to moitor her conduction system for improvement as her acute medical conditions are being treated. Will also need to speak to patient once extubated regarding her wishes.   - Hold any AV melissa blocking agents.   - For full recommendations, please see staff attestation.         Yajaira Menjivar MD  Cardiac Electrophysiology  [facility] - Cardiac Int[facility]

## 2025-04-08 NOTE — SUBJECTIVE & OBJECTIVE
Interval History:   Able to extubated this morning, oxygenating well on RA.  HDS.  CVP 4 this morning.  Not on diuretics, 1.4L/24h.  SCr stable @ 1.8.    Review of patient's allergies indicates:   Allergen Reactions    Adhesive tape-silicones     Adhesive Rash     Pt states she removed a LATEX bandaid and the skin beneath was swollen and red. No other latex causes a reaction.     Current Facility-Administered Medications   Medication Frequency    0.9% NaCl infusion Continuous    fentaNYL 50 mcg/mL injection 50 mcg Q1H PRN    heparin (porcine) injection 5,000 Units Q8H    levothyroxine tablet 50 mcg Before breakfast    mupirocin 2 % ointment BID    nitroGLYCERIN in 5 % dextrose 50 mg/250 mL (200 mcg/mL) infusion Continuous    pantoprazole injection 40 mg Daily    piperacillin-tazobactam (ZOSYN) 4.5 g in D5W 100 mL IVPB (MB+) Q8H    polyethylene glycol packet 17 g BID    senna tablet 8.6 mg Daily    sodium chloride 0.9% flush 10 mL PRN       Objective:     Vital Signs (Most Recent):  Temp: 99.1 °F (37.3 °C) (04/08/25 1124)  Pulse: 61 (04/08/25 1205)  Resp: 19 (04/08/25 1205)  BP: 135/60 (04/08/25 0300)  SpO2: 97 % (04/08/25 1205) Vital Signs (24h Range):  Temp:  [95.5 °F (35.3 °C)-99.1 °F (37.3 °C)] 99.1 °F (37.3 °C)  Pulse:  [61-76] 61  Resp:  [16-33] 19  SpO2:  [89 %-100 %] 97 %  BP: (112-137)/(54-60) 135/60  Arterial Line BP: ()/(41-73) 127/44     Weight: 76 kg (167 lb 8.8 oz) (04/08/25 0300)  Body mass index is 28.76 kg/m².  Body surface area is 1.85 meters squared.    I/O last 3 completed shifts:  In: 1937.5 [P.O.:120; I.V.:1088.2; NG/GT:120; IV Piggyback:609.4]  Out: 2300 [Urine:2300]     Physical Exam  Vitals and nursing note reviewed.   Constitutional:       Appearance: She is ill-appearing.   HENT:      Head: Atraumatic.   Eyes:      General: No scleral icterus.        Right eye: No discharge.         Left eye: No discharge.      Extraocular Movements: Extraocular movements intact.       Conjunctiva/sclera: Conjunctivae normal.   Cardiovascular:      Rate and Rhythm: Normal rate.      Comments: TVP in place  Pulmonary:      Effort: No tachypnea, accessory muscle usage or respiratory distress.      Breath sounds: Normal breath sounds. No wheezing or rales.   Musculoskeletal:      Right lower leg: Edema present.      Left lower leg: Edema present.   Skin:     General: Skin is warm.   Neurological:      Mental Status: She is alert.          Significant Labs:  CBC:   Recent Labs   Lab 04/08/25  0328   WBC 9.33   RBC 4.42   HGB 12.5   HCT 37.6      MCV 85   MCH 28.3   MCHC 33.2     CMP:   Recent Labs   Lab 04/08/25  0328   CALCIUM 9.4   ALBUMIN 2.1*      K 3.7   CO2 18*      BUN 35*   CREATININE 1.8*   ALKPHOS 93   ALT 17   AST 25   BILITOT 0.8

## 2025-04-08 NOTE — PLAN OF CARE
CICU DAILY GOALS     CVP: 4/5/6  ScvO2: 62%    A: Awake    RASS: Goal -    Actual - RASS (Manjarrez Agitation-Sedation Scale): drowsy   Restraint necessity: Clinical Justification: Removing medical devices, Climbing out of bed, Treatment Interference  B: Breath   SBT: Not attempted   C: Coordinate A & B, analgesics/sedatives   Pain: managed    SAT: Not attempted  D: Delirium   CAM-ICU:    E: Early(intubated/ Progressive (non-intubated) Mobility   MOVE Screen: Pass   Activity: Activity Management: Rolling - L1  FAS: Feeding/Nutrition   Diet order: Diet/Nutrition Received: NPO,   Fluid restriction:     Nutritional Supplement Intake: Quantity 0, Type:  none  T: Thrombus   DVT prophylaxis: VTE Core Measure: Pharmacological prophylaxis initiated/maintained  H: HOB Elevation   Head of Bed (HOB) Positioning: HOB elevated  U: Ulcer Prophylaxis   GI: yes  G: Glucose control   managed    S: Skin   Bathing/Skin Care: back care;bath, complete;linen changed (04/07/25 8041)  Wounds: Yes  Wound care consulted: Yes  B: Bowel Function   constipation   I: Indwelling Catheters   Muniz necessity:      Urethral Catheter 04/06/25 0645-Reason for Continuing Urinary Catheterization: Critically ill in ICU and requiring hourly monitoring of intake/output   CVC necessity: Yes  D: De-escalation Antibx   Yes  Plan for the day   Retry SBT/possible extubation.  Family/Goals of care/Code Status   Code Status: Full Code     VS and assessment per flow sheet, patient progressing towards goals as tolerated, plan of care reviewed with Misa Barajas.    Problem: Skin Injury Risk Increased  Goal: Skin Health and Integrity  Outcome: Progressing

## 2025-04-08 NOTE — PROGRESS NOTES
[facility] - Cardiac [facility]ensive [facility]  Nephrology  Progress Note    Patient Name: Misa Barajas  MRN: [id number]  Admission Date: 4/6/2025  Hospital Length of Stay: 2 days  Attending Provider: Honorio Ortiz MD   Primary Care Physician: Isela Langston MD  Principal Problem:Complete heart block    Subjective:     Interval History:   Able to extubated this morning, oxygenating well on RA.  HDS.  CVP 4 this morning.  Not on diuretics, 1.4L/24h.  SCr stable @ 1.8.    Review of patient's allergies indicates:   Allergen Reactions    Adhesive tape-silicones     Adhesive Rash     Pt states she removed a LATEX bandaid and the skin beneath was swollen and red. No other latex causes a reaction.     Current Facility-Administered Medications   Medication Frequency    0.9% NaCl infusion Continuous    fentaNYL 50 mcg/mL injection 50 mcg Q1H PRN    heparin (porcine) injection 5,000 Units Q8H    levothyroxine tablet 50 mcg Before breakfast    mupirocin 2 % ointment BID    nitroGLYCERIN in 5 % dextrose 50 mg/250 mL (200 mcg/mL) infusion Continuous    pantoprazole injection 40 mg Daily    piperacillin-tazobactam (ZOSYN) 4.5 g in D5W 100 mL IVPB (MB+) Q8H    polyethylene glycol packet 17 g BID    senna tablet 8.6 mg Daily    sodium chloride 0.9% flush 10 mL PRN       Objective:     Vital Signs (Most Recent):  Temp: 99.1 °F (37.3 °C) (04/08/25 1124)  Pulse: 61 (04/08/25 1205)  Resp: 19 (04/08/25 1205)  BP: 135/60 (04/08/25 0300)  SpO2: 97 % (04/08/25 1205) Vital Signs (24h Range):  Temp:  [95.5 °F (35.3 °C)-99.1 °F (37.3 °C)] 99.1 °F (37.3 °C)  Pulse:  [61-76] 61  Resp:  [16-33] 19  SpO2:  [89 %-100 %] 97 %  BP: (112-137)/(54-60) 135/60  Arterial Line BP: ()/(41-73) 127/44     Weight: 76 kg (167 lb 8.8 oz) (04/08/25 0300)  Body mass index is 28.76 kg/m².  Body surface area is 1.85 meters squared.    I/O last 3 completed shifts:  In: 1937.5 [P.O.:120; I.V.:1088.2; NG/GT:120; IV Piggyback:609.4]  Out: 2300 [Urine:2300]      Physical Exam  Vitals and nursing note reviewed.   Constitutional:       Appearance: She is ill-appearing.   HENT:      Head: Atraumatic.   Eyes:      General: No scleral icterus.        Right eye: No discharge.         Left eye: No discharge.      Extraocular Movements: Extraocular movements intact.      Conjunctiva/sclera: Conjunctivae normal.   Cardiovascular:      Rate and Rhythm: Normal rate.      Comments: TVP in place  Pulmonary:      Effort: No tachypnea, accessory muscle usage or respiratory distress.      Breath sounds: Normal breath sounds. No wheezing or rales.   Musculoskeletal:      Right lower leg: Edema present.      Left lower leg: Edema present.   Skin:     General: Skin is warm.   Neurological:      Mental Status: She is alert.          Significant Labs:  CBC:   Recent Labs   Lab 04/08/25  0328   WBC 9.33   RBC 4.42   HGB 12.5   HCT 37.6      MCV 85   MCH 28.3   MCHC 33.2     CMP:   Recent Labs   Lab 04/08/25  0328   CALCIUM 9.4   ALBUMIN 2.1*      K 3.7   CO2 18*      BUN 35*   CREATININE 1.8*   ALKPHOS 93   ALT 17   AST 25   BILITOT 0.8        Assessment/Plan:     Renal/  MARQUEZ (acute kidney injury)  Misa Barajas is a 79 y.o. female ,is admitted on 4/6/2025  with past medical history of   COPD on 2L home O2, HFpEF, AFL s/p RFA (6/17/24 w/ Dr. Church), CKD III, HTN, HLD, chronic venous insufficiency, orthostatic hypotension.     Admitted with bradycardia and hypotension, requiring pacing and pressors  Nephrology  is consulted for MARQUEZ on CKD     Impression:  MARQUEZ on CKD 3    Baseline Creatinine: 1.2-1.4  Creatinine at time of consult: 2.1, stable at 1.8 for several days  Tests done: UA neg  MARQUEZ 2/2 iATN from bradycardia/hemodynamic changes.        Recommendations :   -No urgent indication for RRT. 24 UOP 1.3L. Electrolytes stable.   -no need for lasix at this time, continue trending I/O's; CVP.   -Monitor serum chemistries daily,  -Avoid nephrotoxic medications (NSAID, IV  contrast)  -Avoid ACEi and ARBs in the setting of MARQUEZ  -Maintain MAP > 65  -Transfuse for Hb <7      Ethan Roque NP  Nephrology  [facility] - Cardiac [facility]

## 2025-04-08 NOTE — ASSESSMENT & PLAN NOTE
.BMP reviewed- noted Estimated Creatinine Clearance: 19.6 mL/min (A) (based on SCr of 2.1 mg/dL (H)). according to latest data. Based on current GFR, CKD stage is stage 4 - GFR 15-29.  Monitor UOP and serial BMP and adjust therapy as needed. Renally dose meds. Avoid nephrotoxic medications and procedures.  Recent Labs     04/07/25  0959 04/07/25  1428 04/08/25  0328   CREATININE 1.8* 1.8* 1.8*   EGFRNORACEVR 28* 28* 28*     - correcting electrolytes as needed. Unclear if hypercalcemia was secondary to medications but repeating levels

## 2025-04-08 NOTE — ASSESSMENT & PLAN NOTE
Most recent BNP and echo results are listed below.  Recent Labs     04/06/25  0234   *     Latest ECHO  Results for orders placed during the hospital encounter of 01/29/25    Echo    Interpretation Summary    Left Ventricle: The left ventricle is normal in size. Normal wall thickness. Normal wall motion. There is normal systolic function. Ejection fraction is approximately 55%.    Right Ventricle: Normal right ventricular cavity size. Wall thickness is normal. Systolic function is borderline low.    Left Atrium: Left atrium is severely dilated.    Mitral Valve: There is moderate posterior mitral annular calcification present.    Tricuspid Valve: There is mild regurgitation.    Pulmonary Artery: The estimated pulmonary artery systolic pressure is 51 mmHg.    IVC/SVC: Elevated venous pressure at 15 mmHg.    Current Heart Failure Medications: have been held due to bradycardia and MARQUEZ  nitroGLYCERIN in 5 % dextrose 50 mg/250 mL (200 mcg/mL) infusion, Continuous, Intravenous    Plan  - Monitor strict I&Os and daily weights.    - Place on telemetry  - repeating official echo, bedside echo appears to have normal LVEF, IVC cannot be assessed as patient intubated

## 2025-04-08 NOTE — ASSESSMENT & PLAN NOTE
Patient with Hypoxic Respiratory failure which is Chronic.  she is on home oxygen at 2 LPM. Supplemental oxygen was provided and noted- Vent Mode: A/C  Oxygen Concentration (%):  [21-28] 21  Resp Rate Total:  [7.3 br/min-21 br/min] 17 br/min  Vt Set:  [430 mL] 430 mL  PEEP/CPAP:  [5 cmH20] 5 cmH20  Pressure Support:  [8 cmH20] 8 cmH20  Mean Airway Pressure:  [7.9 cmH20-9.4 cmH20] 8.9 cmH20    .   Signs/symptoms of respiratory failure include- tachypnea, increased work of breathing, use of accessory muscles, and lethargy. Contributing diagnoses includes - underlying COPD

## 2025-04-08 NOTE — ASSESSMENT & PLAN NOTE
Anemia is likely due to chronic blood loss. Most recent hemoglobin and hematocrit are listed below.  Recent Labs     04/06/25  0833 04/07/25  0318 04/08/25  0328   HGB 11.9* 12.5 12.5   HCT 37.7 38.6 37.6     Plan  - Monitor serial CBC: Daily  - Transfuse PRBC if patient becomes hemodynamically unstable, symptomatic or H/H drops below 7/21.  - Patient has not received any PRBC transfusions to date  - Patient's anemia is currently stable  - holding iron supplementation while intubated

## 2025-04-08 NOTE — PROGRESS NOTES
[facility] - Cardiac [facility]ensive [facility]  Cardiology  Progress Note    Patient Name: Misa Barajas  MRN: [id number]  Admission Date: 4/6/2025  Hospital Length of Stay: 2 days  Code Status: Full Code  Attending Physician: Honorio Ortiz MD  Primary Care Provider: Isela Langston MD  Expected Discharge Date: 4/11/2025  Principal Problem: Complete heart block    Subjective   Hospital Course:  Admitted to CCU for CHB in s/o hyperkalemia & in the presence of conduction disease/RBBB & LAHB - currently stable w/ temporary wire. Septic shock d/t UTI; weaned vasopressors. HTN, started NG gtt. Acute on chronic hypoxic/hypercapnic RF, intubated. Attempted SAT/SBT but failed. MARQUEZ on CKD; improved w/ good UOP response to diuresis.    Objective    Interval History: NAEON, AF HDS on NG gtt to SBP <160, intubated on min vent settings. Alert & follows commands on dexm; on SBT. On vanc/pip-tazo for UTI/septic shock    ROS  Objective:     Vital Signs (Most Recent):  Temp: 98.4 °F (36.9 °C) (04/08/25 0734)  Pulse: 74 (04/08/25 0734)  Resp: (!) 21 (04/08/25 0734)  BP: 135/60 (04/08/25 0300)  SpO2: (!) 94 % (04/08/25 0734) Vital Signs (24h Range):  Temp:  [95.5 °F (35.3 °C)-98.4 °F (36.9 °C)] 98.4 °F (36.9 °C)  Pulse:  [60-75] 74  Resp:  [8-33] 21  SpO2:  [89 %-100 %] 94 %  BP: (112-176)/(54-66) 135/60  Arterial Line BP: ()/(41-76) 151/63     Weight: 76 kg (167 lb 8.8 oz)  Body mass index is 28.76 kg/m².     SpO2: (!) 94 %       Intake/Output Summary (Last 24 hours) at 4/8/2025 0809  Last data filed at 4/8/2025 0600  Gross per 24 hour   Intake 1344.54 ml   Output 1285 ml   Net 59.54 ml     Lines/Drains/Airways       Central Venous Catheter Line  Duration              Introducer Single Lumen 04/06/25 0230 Internal Jugular Right 2 days    Percutaneous Central Line - Triple Lumen  04/06/25 0811 Internal Jugular Left 1 day              Drain  Duration                  NG/OG Tube 04/06/25 0349 Left mouth 2 days         Urethral  Catheter 04/06/25 0645 2 days              Airway  Duration                  Airway - Non-Surgical 04/06/25 0228 Endotracheal Tube 2 days              Arterial Line  Duration             Arterial Line 04/06/25 0812 Left Radial 1 day              Line  Duration                  Pacer Wires 04/06/25 0256 2 days              Peripheral Intravenous Line  Duration                  Peripheral IV - Single Lumen 04/06/25 0216 18 G Posterior;Right Forearm 2 days                   Physical Exam  Vitals and nursing note reviewed.   Constitutional:       Appearance: She is ill-appearing.      Comments: Intubated and sedated   HENT:      Head: Normocephalic and atraumatic.      Mouth/Throat:      Mouth: Mucous membranes are moist.   Eyes:      General: No scleral icterus.  Cardiovascular:      Pulses:           Carotid pulses are 2+ on the right side and 2+ on the left side.       Radial pulses are 2+ on the right side and 2+ on the left side.        Dorsalis pedis pulses are 1+ on the right side and 1+ on the left side.        Posterior tibial pulses are 1+ on the right side and 1+ on the left side.      Heart sounds: No murmur heard.     Comments: Paced rhythm  Pulmonary:      Comments: On mechanical support  Abdominal:      General: Bowel sounds are normal. There is distension.   Musculoskeletal:      Right lower leg: Edema present.      Left lower leg: Edema present.      Comments: +1 pitting edema, venous stasis   Skin:     Coloration: Skin is pale.      Comments: cold   Neurological:      Comments: Unable to assess as sedated        Significant Labs: All pertinent lab results from the last 24 hours have been reviewed.    Significant Imaging:  reviewed  Assessment & Plan  Complete heart block  Shock  Ms. Misa Barajas is a 80 yo female with COPD on 2L home O2, HFpEF, AFL s/p RFA (6/17/24 w/ Dr. Church), CKD III, HTN, HLD, chronic venous insufficiency, orthostatic hypotension who was brought via EMS after per history she  was found confused and bradycardiac with HR in the 20s.They started epi and transcutaneous pacing. They had to bag her in route. We do not have strip of underlying rhythm. Patient obtunded for which she was intubated on arrival to the ED and transcutaneous pacing was exchanged to transvenous pacing via RIJ. Underlying rate 44 bifascicular block. Known RBBB.    -Unclear etiology ddx include sepsis, mesenteric ischemia?  - elevated lactic acid. Awaiting CT h/c/a/p  - bradycardia most likely in the setting of electrolyte disturbance and abnormal thyroid function  - correcting electrolyte disturbance. Suspect most likely she will then not require tvp.   - obtaining bcx and ucx. Broad spectrum abx ppx and deescalating as needed.   - orosco placement  RBBB  Chronic   CKD (chronic kidney disease) stage 3, GFR 30-59 ml/min  Now MARQUEZ on CKD3. Nephrology consulted. Patient on bumex and potassium supplement as home which have been held  Centrilobular emphysema  Prior history of smoking. Continue scheduled inhalers once extubated. Antibiotics and Supplemental oxygen and monitor respiratory status closely.   Chronic respiratory failure with hypoxia  Patient with Hypoxic Respiratory failure which is Chronic.  she is on home oxygen at 2 LPM. Supplemental oxygen was provided and noted- Vent Mode: A/C  Oxygen Concentration (%):  [21-28] 21  Resp Rate Total:  [7.3 br/min-21 br/min] 17 br/min  Vt Set:  [430 mL] 430 mL  PEEP/CPAP:  [5 cmH20] 5 cmH20  Pressure Support:  [8 cmH20] 8 cmH20  Mean Airway Pressure:  [7.9 cmH20-9.4 cmH20] 8.9 cmH20    .   Signs/symptoms of respiratory failure include- tachypnea, increased work of breathing, use of accessory muscles, and lethargy. Contributing diagnoses includes - underlying COPD   MARQUEZ (acute kidney injury)  .BMP reviewed- noted Estimated Creatinine Clearance: 19.6 mL/min (A) (based on SCr of 2.1 mg/dL (H)). according to latest data. Based on current GFR, CKD stage is stage 4 - GFR 15-29.   Monitor UOP and serial BMP and adjust therapy as needed. Renally dose meds. Avoid nephrotoxic medications and procedures.  Recent Labs     04/07/25  0959 04/07/25  1428 04/08/25  0328   CREATININE 1.8* 1.8* 1.8*   EGFRNORACEVR 28* 28* 28*     - correcting electrolytes as needed. Unclear if hypercalcemia was secondary to medications but repeating levels  Acute on chronic congestive heart failure   Most recent BNP and echo results are listed below.  Recent Labs     04/06/25  0234   *     Latest ECHO  Results for orders placed during the hospital encounter of 01/29/25    Echo    Interpretation Summary    Left Ventricle: The left ventricle is normal in size. Normal wall thickness. Normal wall motion. There is normal systolic function. Ejection fraction is approximately 55%.    Right Ventricle: Normal right ventricular cavity size. Wall thickness is normal. Systolic function is borderline low.    Left Atrium: Left atrium is severely dilated.    Mitral Valve: There is moderate posterior mitral annular calcification present.    Tricuspid Valve: There is mild regurgitation.    Pulmonary Artery: The estimated pulmonary artery systolic pressure is 51 mmHg.    IVC/SVC: Elevated venous pressure at 15 mmHg.    Current Heart Failure Medications: have been held due to bradycardia and MARQUEZ  nitroGLYCERIN in 5 % dextrose 50 mg/250 mL (200 mcg/mL) infusion, Continuous, Intravenous    Plan  - Monitor strict I&Os and daily weights.    - Place on telemetry  - repeating official echo, bedside echo appears to have normal LVEF, IVC cannot be assessed as patient intubated  Orthostatic hypotension  holding midodrine and per history patient had not required it since last hospitalization  Iron deficiency anemia due to chronic blood loss  Anemia is likely due to chronic blood loss. Most recent hemoglobin and hematocrit are listed below.  Recent Labs     04/06/25  0833 04/07/25  0318 04/08/25  0328   HGB 11.9* 12.5 12.5   HCT 37.7 38.6  37.6     Plan  - Monitor serial CBC: Daily  - Transfuse PRBC if patient becomes hemodynamically unstable, symptomatic or H/H drops below 7/21.  - Patient has not received any PRBC transfusions to date  - Patient's anemia is currently stable  - holding iron supplementation while intubated  Subclinical hypothyroidism  TSH 10.722 with normal FT4. Consulting endo.      VTE Risk Mitigation (From admission, onward)           Ordered     heparin (porcine) injection 5,000 Units  Every 8 hours         04/06/25 1027     IP VTE HIGH RISK PATIENT  Once         04/06/25 0512     Place sequential compression device  Until discontinued         04/06/25 0454                    Talha Salcido MD  Cardiology  [facility] - [facility]

## 2025-04-08 NOTE — PROGRESS NOTES
Patient is extubated @ about 11:05 to 2 L nasal cannula per Dr. CAROLINE Sneed order. No sign of distress noted @ this time. Will continue to monitor.

## 2025-04-08 NOTE — PLAN OF CARE
CICU DAILY GOALS   CVP 4 5 2  SvO2 62    Extubated at 1105 with no complications throughout day. Off precedex and nitro. Paced occasionally with normal sinus throughout whole shift.    A: Awake    RASS: Goal -    Actual - RASS (Manjarrez Agitation-Sedation Scale): drowsy   Restraint necessity: Clinical Justification: Removing medical devices, Treatment Interference  B: Breath   SBT: Pass   C: Coordinate A & B, analgesics/sedatives   Pain: managed    SAT: Pass  D: Delirium   CAM-ICU:    E: Early(intubated/ Progressive (non-intubated) Mobility   MOVE Screen: Fail   Activity: Activity Management: Arm raise - L1, Rolling - L1, Ankle pumps - L1  FAS: Feeding/Nutrition   Diet order: Diet/Nutrition Received: NPO,   Fluid restriction:     Nutritional Supplement Intake: Quantity 0, Type:  NPO  T: Thrombus   DVT prophylaxis: VTE Core Measure: Pharmacological prophylaxis initiated/maintained  H: HOB Elevation   Head of Bed (HOB) Positioning: HOB at 30-45 degrees  U: Ulcer Prophylaxis   GI: yes  G: Glucose control   managed    S: Skin   Bathing/Skin Care: bath, partial;patient refused (refusal to bath, able to wipe near lines and do orosco care) (04/08/25 1805)  Wounds: No  Wound care consulted: No  B: Bowel Function   no issues   I: Indwelling Catheters   Orosco necessity:      Urethral Catheter 04/06/25 0645-Reason for Continuing Urinary Catheterization: Critically ill in ICU and requiring hourly monitoring of intake/output   CVC necessity: Yes  D: De-escalation Antibx   Yes  Family/Goals of care/Code Status   Code Status: Full Code

## 2025-04-08 NOTE — PROGRESS NOTES
Pharmacokinetic Assessment Follow Up: IV Vancomycin    Vancomycin serum concentration assessment(s):    Vanc R = 20.1 mcg/mL    Vancomycin Regimen Plan:  Hold vancomycin today  Obtain concentration in AM 4/9  Goal trough = 15-20 mcg/mL    Thank you for the consult, will continue to follow    Baltazar Muniz Pharm.D., BCPS  [id number]         Patient brief summary:  Misa Barajas is a 79 y.o. female initiated on antimicrobial therapy with IV Vancomycin for treatment of sepsis    Drug levels (last 3 results):  Recent Labs   Lab Result Units 04/07/25  0318 04/08/25  0328   Vancomycin Random ug/ml 15.3 20.1     Drug Allergies:   Review of patient's allergies indicates:   Allergen Reactions    Adhesive tape-silicones     Adhesive Rash     Pt states she removed a LATEX bandaid and the skin beneath was swollen and red. No other latex causes a reaction.       Actual Body Weight:   76 kg    Renal Function:   Estimated Creatinine Clearance: 24.5 mL/min (A) (based on SCr of 1.8 mg/dL (H)).,     CBC (last 72 hours):  Recent Labs   Lab Result Units 04/06/25  0234 04/06/25  0833 04/07/25  0318 04/08/25  0328   WBC K/uL 10.54 13.99* 13.07* 9.33   HGB gm/dL 12.4 11.9* 12.5 12.5   HCT % 40.7 37.7 38.6 37.6   Platelet Count K/uL 224 179 173 162   Lymph % % 34.3 4.9* 8.3* 9.3*   Mono % % 9.2 8.8 8.4 10.6   Eos % % 4.1 0.1 1.0 2.3   Basophil % % 1.1 0.4 0.4 0.6       Metabolic Panel (last 72 hours):  Recent Labs   Lab Result Units 04/06/25  0234 04/06/25  0547 04/06/25  0833 04/06/25  1131 04/06/25  1533 04/06/25  1544 04/06/25  2043 04/07/25  0019 04/07/25  0203 04/07/25  0318 04/07/25  0959 04/07/25  1428 04/08/25  0328   Sodium mmol/L 136  --  137 140 141  --  139 138 138 138 137 138 136   Potassium mmol/L 6.0*  --  4.3 5.2* 5.3*  --  5.8* 4.7 4.8 4.8 4.2 4.0 3.7   Chloride mmol/L 111*  --  109 110 112*  --  112* 110 109 110 108 110 109   CO2 mmol/L 19*  --  18* 18* 19*  --  18* 18* 18* 19* 20* 19* 18*   Glucose mg/dL 209*  --  175* 140*  130*  --  136* 157* 156* 149* 142* 138* 120*   Glucose, UA   --  Negative  --   --   --   --   --   --   --   --   --   --   --    BUN mg/dL 29*  --  31* 32* 31*  --  31* 33* 32* 33* 33* 32* 35*   Creatinine mg/dL 2.1*  --  1.9* 1.9* 1.8*  --  1.8* 1.8* 1.8* 1.9* 1.8* 1.8* 1.8*   Urine Creatinine mg/dL  --   --   --   --   --  93.0  --   --   --   --   --   --   --    Albumin g/dL 2.8*  --   --   --   --   --   --   --   --  2.4*  --   --  2.1*   Bilirubin Total mg/dL 0.4  --   --   --   --   --   --   --   --  0.6  --   --  0.8   ALP unit/L 139  --   --   --   --   --   --   --   --  103  --   --  93   AST unit/L 46*  --   --   --   --   --   --   --   --  32  --   --  25   ALT unit/L 28  --   --   --   --   --   --   --   --  21  --   --  17   Magnesium  mg/dL 2.2  --  2.0  --   --   --   --   --   --  1.9  --   --  2.0   Phosphorus Level mg/dL  --   --  3.8  --   --   --   --   --   --  4.5  --   --  4.4       Vancomycin Administrations:  vancomycin given in the last 96 hours                     vancomycin 750 mg in 0.9% NaCl 250 mL IVPB (admixture device) (mg) 750 mg New Bag 04/07/25 0990    vancomycin 1,250 mg in 0.9% NaCl 250 mL IVPB (admixture device) (mg) 1,250 mg New Bag 04/06/25 0524                    Microbiologic Results:  Microbiology Results (last 7 days)       Procedure Component Value Units Date/Time    Blood culture [[id number]]  (Normal) Collected: 04/06/25 6189    Order Status: Completed Specimen: Blood from Peripheral, Wrist, Left Updated: 04/07/25 2002     Blood Culture No Growth After 36 Hours    Blood culture [[id number]]  (Normal) Collected: 04/06/25 0432    Order Status: Completed Specimen: Blood from Peripheral, Wrist, Left Updated: 04/07/25 2002     Blood Culture No Growth After 36 Hours

## 2025-04-08 NOTE — SUBJECTIVE & OBJECTIVE
Interval History: NAEON. Remains intubated. Patient remain mostly in NSR w/ intermittent pacing for CHB.     ROS 12 point ROS negative except for HPI  Objective:     Vital Signs (Most Recent):  Temp: 98.4 °F (36.9 °C) (04/08/25 0734)  Pulse: 74 (04/08/25 0734)  Resp: (!) 21 (04/08/25 0734)  BP: 135/60 (04/08/25 0300)  SpO2: (!) 94 % (04/08/25 0734) Vital Signs (24h Range):  Temp:  [95.5 °F (35.3 °C)-98.4 °F (36.9 °C)] 98.4 °F (36.9 °C)  Pulse:  [60-75] 74  Resp:  [8-33] 21  SpO2:  [89 %-100 %] 94 %  BP: (112-176)/(54-66) 135/60  Arterial Line BP: ()/(41-76) 151/63     Weight: 76 kg (167 lb 8.8 oz)  Body mass index is 28.76 kg/m².     SpO2: (!) 94 %        Physical Exam     Constitutional:       Appearance: She is ill-appearing.      Comments: Intubated and sedated   HENT:      Head: Normocephalic and atraumatic.      Mouth/Throat:      Mouth: Mucous membranes are moist.   Eyes:      General: No scleral icterus.  Cardiovascular:      Pulses:           Carotid pulses are 2+ on the right side and 2+ on the left side.       Radial pulses are 2+ on the right side and 2+ on the left side.        Dorsalis pedis pulses are 1+ on the right side and 1+ on the left side.        Posterior tibial pulses are 1+ on the right side and 1+ on the left side.      Heart sounds: No murmur heard.     Comments: Paced rhythm  Pulmonary:      Comments: On mechanical support  Abdominal:      General: Bowel sounds are normal. There is distension.   Musculoskeletal:      Right lower leg: Edema present.      Left lower leg: Edema present.      Comments: +1 pitting edema, venous stasis   Skin:     Coloration: Skin is pale.      Comments: cold   Neurological:      Comments: Unable to assess as sedated   Significant Labs: All pertinent lab results from the last 24 hours have been reviewed.    Significant Imaging: Echocardiogram: Transthoracic echo (TTE) complete (Cupid Only):   Results for orders placed or performed during the hospital  encounter of 04/06/25   Echo   Result Value Ref Range    LV Diastolic Volume 69 mL    Echo EF Estimated 60 %    LV Systolic Volume 28 mL    IVS 0.8 0.6 - 1.1 cm    LVIDd 4.0 3.5 - 6.0 cm    LVIDs 2.7 2.1 - 4.0 cm    LVOT diameter 1.9 cm    PW 0.9 0.6 - 1.1 cm    AV LVOT peak gradient 3 mmHg    LVOT mn grad 2 mmHg    LVOT peak norberto 0.9 m/s    LVOT peak VTI 16.0 cm    RV- razo basal diam 2.5 cm    LA size 3.9 cm    Left Atrium Major Axis 4.3 cm    Left Atrium Minor Axis 4.0 cm    LA Vol (MOD) 32 mL    RA Major Weiser 4.15 cm    AV valve area 1.8 cm2    AV area by cont VTI 1.8 cm2    AV peak gradient 7 mmHg    AV mean gradient 4 mmHg    Ao peak norberto 1.3 m/s    Ao VTI 24.8 cm    MV Peak A Norberto 1.19 m/s    E wave deceleration time 330 ms    MV Peak E Norberto 0.77 m/s    E/A ratio 0.65     LV LATERAL E/E' RATIO 25.7     LV SEPTAL E/E' RATIO 15.4     TDI LATERAL 0.03 m/s    TDI SEPTAL 0.05 m/s    MV peak gradient 5 mmHg    MV mean gradient 2 mmHg    TV peak gradient 22 mmHg    TR Max Norberto 2.3 m/s    Ascending aorta 2.61 cm    STJ 2.30 cm    Sinus 2.95 cm    LA WIDTH 2.4 cm    RA Width 3.25 cm    TAPSE 1.53 cm    BSA 1.85 m2    LVOT stroke volume 45.3 cm3    LV Systolic Volume Index 15.5 mL/m2    LV Diastolic Volume Index 38.12 mL/m2    LVOT area 2.8 cm2    FS 32.5 28 - 44 %    Left Ventricle Relative Wall Thickness 0.45 cm    LV mass 101.4 g    LV Mass Index 56.0 g/m2    E/E' ratio 19 m/s    TOMÁS 18 mL/m2    LA Vol 33 cm3    RV/LV Ratio 0.63 cm    TOMÁS (MOD) 18 mL/m2    AV Velocity Ratio 0.69     AV index (prosthetic) 0.65     KHOI by Velocity Ratio 2.0 cm²    Mean e' 0.04 m/s    ZLVIDS -1.08     ZLVIDD -2.25     Narrative      Left Ventricle: The left ventricle is normal in size. Normal wall   thickness. There is low normal systolic function with a visually estimated   ejection fraction of 50 - 55%. Grade II diastolic dysfunction.    Right Ventricle: The right ventricle is normal in size. Wall thickness   is normal. Systolic  function is normal.    IVC/SVC: Patient is ventilated, cannot use IVC diameter to estimate   right atrial pressure.

## 2025-04-08 NOTE — CARE UPDATE
CCU Care Update Note:    Hemodynamics@8pm:  Date CVP SVO2 CO/CI SVR PAP PCWP Susie    4/7 4 62 4.48/2.43 1143                Continuous Infusions:   0.9% NaCl   Intravenous Continuous 10 mL/hr at 04/07/25 2100 Rate Verify at 04/07/25 2100    dexmedeTOMIDine (Precedex) infusion (titrating)  0-1.4 mcg/kg/hr Intravenous Continuous 13.16 mL/hr at 04/07/25 2100 0.8 mcg/kg/hr at 04/07/25 2100    nitroGLYCERIN  0-400 mcg/min Intravenous Continuous 1.5 mL/hr at 04/07/25 2100 5 mcg/min at 04/07/25 2100       Intake/Output Summary (Last 24 hours) at 4/7/2025 2154  Last data filed at 4/7/2025 2100  Gross per 24 hour   Intake 1497.69 ml   Output 2095 ml   Net -597.31 ml           Plan:  No changes. Continue current management       Discussed with CCU Attending      Manuel Trevino MD, MPH  Cardiovascular Disease PGY IV  [facility]

## 2025-04-08 NOTE — ASSESSMENT & PLAN NOTE
Misa Barajas is a 79 y.o. female ,is admitted on 4/6/2025  with past medical history of   COPD on 2L home O2, HFpEF, AFL s/p RFA (6/17/24 w/ Dr. Church), CKD III, HTN, HLD, chronic venous insufficiency, orthostatic hypotension.     Admitted with bradycardia and hypotension, requiring pacing and pressors  Nephrology  is consulted for MARQUEZ on CKD     Impression:  MARQUEZ on CKD 3    Baseline Creatinine: 1.2-1.4  Creatinine at time of consult: 2.1, stable at 1.8 for several days  Tests done: UA neg  MARQUEZ 2/2 iATN from bradycardia/hemodynamic changes.        Recommendations :   -No urgent indication for RRT. 24 UOP 1.3L. Electrolytes stable.   -no need for lasix at this time, continue trending I/O's; CVP.   -Monitor serum chemistries daily,  -Avoid nephrotoxic medications (NSAID, IV contrast)  -Avoid ACEi and ARBs in the setting of MARQUEZ  -Maintain MAP > 65  -Transfuse for Hb <7

## 2025-04-09 NOTE — SUBJECTIVE & OBJECTIVE
Interval History: PVCs w/ intermittent pacing by TVP, also intermittent bradycardia, TVP backup rate increased 50 to 60; doing well, AF HDS on 2 L NC; hypokalemia (replaced), stable Cr. On pip-tazo (start 04/06) for urosepsis; switch to amox-clav. NPO MN for PPM tmr (tentative). Levothyroxine 50    ROS  Objective:     Vital Signs (Most Recent):  Temp: 97.3 °F (36.3 °C) (04/09/25 0601)  Pulse: 69 (04/09/25 0601)  Resp: 15 (04/09/25 0601)  BP: (!) 115/53 (04/09/25 0601)  SpO2: 97 % (04/09/25 0601) Vital Signs (24h Range):  Temp:  [96.6 °F (35.9 °C)-99.1 °F (37.3 °C)] 97.3 °F (36.3 °C)  Pulse:  [55-94] 69  Resp:  [11-21] 15  SpO2:  [92 %-100 %] 97 %  BP: ()/(41-57) 115/53  Arterial Line BP: ()/(35-54) 148/42     Weight: 75 kg (165 lb 5.5 oz)  Body mass index is 28.38 kg/m².     SpO2: 97 %     Intake/Output Summary (Last 24 hours) at 4/9/2025 0718  Last data filed at 4/9/2025 0601  Gross per 24 hour   Intake 1848.49 ml   Output 860 ml   Net 988.49 ml     Lines/Drains/Airways       Central Venous Catheter Line  Duration              Introducer Single Lumen 04/06/25 0230 Internal Jugular Right 3 days    Percutaneous Central Line - Triple Lumen  04/06/25 0811 Internal Jugular Left 2 days              Drain  Duration                  Urethral Catheter 04/06/25 0645 3 days              Arterial Line  Duration             Arterial Line 04/06/25 0812 Left Radial 2 days              Line  Duration                  Pacer Wires 04/06/25 0256 3 days              Peripheral Intravenous Line  Duration                  Peripheral IV - Single Lumen 04/06/25 0216 18 G Posterior;Right Forearm 3 days                   Physical Exam  Vitals and nursing note reviewed.   Constitutional:       General: She is not in acute distress.     Appearance: She is ill-appearing.   HENT:      Head: Normocephalic and atraumatic.      Right Ear: External ear normal.      Left Ear: External ear normal.      Mouth/Throat:      Mouth: Mucous  membranes are moist.      Pharynx: No oropharyngeal exudate.   Eyes:      General: No scleral icterus.     Extraocular Movements: Extraocular movements intact.      Pupils: Pupils are equal, round, and reactive to light.   Cardiovascular:      Rate and Rhythm: Normal rate.      Pulses:           Carotid pulses are 2+ on the right side and 2+ on the left side.       Radial pulses are 2+ on the right side and 2+ on the left side.        Dorsalis pedis pulses are 1+ on the right side and 1+ on the left side.        Posterior tibial pulses are 1+ on the right side and 1+ on the left side.      Heart sounds: Normal heart sounds. No murmur heard.     Comments: Intermittently paced rhythm  Pulmonary:      Effort: Pulmonary effort is normal. No respiratory distress.      Breath sounds: Normal breath sounds. No wheezing or rales.   Abdominal:      General: Abdomen is flat. Bowel sounds are normal. There is distension.      Palpations: Abdomen is soft.      Tenderness: There is no abdominal tenderness.   Musculoskeletal:         General: No signs of injury. Normal range of motion.      Cervical back: Normal range of motion.      Right lower leg: Edema present.      Left lower leg: Edema present.   Skin:     General: Skin is warm and dry.      Coloration: Skin is pale.   Neurological:      General: No focal deficit present.      Mental Status: She is alert.      Sensory: No sensory deficit.      Motor: No weakness.   Psychiatric:         Mood and Affect: Mood normal.         Behavior: Behavior normal.         Thought Content: Thought content normal.      Significant Labs: All pertinent lab results from the last 24 hours have been reviewed.    Significant Imaging:  reviewed

## 2025-04-09 NOTE — PROGRESS NOTES
[facility] - [facility]  Cardiology  Progress Note    Patient Name: Misa Barajas  MRN: [id number]  Admission Date: 4/6/2025  Hospital Length of Stay: 3 days  Code Status: Full Code  Attending Physician: Honorio Ortiz MD  Primary Care Provider: Isela Langston MD  Expected Discharge Date: 4/11/2025  Principal Problem: Complete heart block    Subjective   Hospital Course:  Admitted to CCU for CHB in s/o hyperkalemia & in the presence of conduction disease/RBBB & LAHB - stable w/ temporary wire. Septic shock d/t UTI; vasopressors weaned off. HTN; started NG gtt. Acute on chronic hypoxic/hypercapnic RF & obtunded; intubated. Successfully extubated 04/08. MARQUEZ on CKD; improved w/ good UOP response to diuresis. PVCs w/ intermittent pacing by TVP.    Objective    Interval History: PVCs w/ intermittent pacing by TVP, also intermittent bradycardia, TVP backup rate increased 50 to 60; doing well, AF HDS on 2 L NC; hypokalemia (replaced), stable Cr. On pip-tazo (start 04/06) for urosepsis; switch to amox-clav. NPO MN for PPM tmr (tentative). Levothyroxine 50    ROS  Objective:     Vital Signs (Most Recent):  Temp: 97.3 °F (36.3 °C) (04/09/25 0601)  Pulse: 69 (04/09/25 0601)  Resp: 15 (04/09/25 0601)  BP: (!) 115/53 (04/09/25 0601)  SpO2: 97 % (04/09/25 0601) Vital Signs (24h Range):  Temp:  [96.6 °F (35.9 °C)-99.1 °F (37.3 °C)] 97.3 °F (36.3 °C)  Pulse:  [55-94] 69  Resp:  [11-21] 15  SpO2:  [92 %-100 %] 97 %  BP: ()/(41-57) 115/53  Arterial Line BP: ()/(35-54) 148/42     Weight: 75 kg (165 lb 5.5 oz)  Body mass index is 28.38 kg/m².     SpO2: 97 %     Intake/Output Summary (Last 24 hours) at 4/9/2025 0718  Last data filed at 4/9/2025 0601  Gross per 24 hour   Intake 1848.49 ml   Output 860 ml   Net 988.49 ml     Lines/Drains/Airways       Central Venous Catheter Line  Duration              Introducer Single Lumen 04/06/25 0230 Internal Jugular Right 3 days    Percutaneous Central Line -  Triple Lumen  04/06/25 0811 Internal Jugular Left 2 days              Drain  Duration                  Urethral Catheter 04/06/25 0645 3 days              Arterial Line  Duration             Arterial Line 04/06/25 0812 Left Radial 2 days              Line  Duration                  Pacer Wires 04/06/25 0256 3 days              Peripheral Intravenous Line  Duration                  Peripheral IV - Single Lumen 04/06/25 0216 18 G Posterior;Right Forearm 3 days                   Physical Exam  Vitals and nursing note reviewed.   Constitutional:       General: She is not in acute distress.     Appearance: She is ill-appearing.   HENT:      Head: Normocephalic and atraumatic.      Right Ear: External ear normal.      Left Ear: External ear normal.      Mouth/Throat:      Mouth: Mucous membranes are moist.      Pharynx: No oropharyngeal exudate.   Eyes:      General: No scleral icterus.     Extraocular Movements: Extraocular movements intact.      Pupils: Pupils are equal, round, and reactive to light.   Cardiovascular:      Rate and Rhythm: Normal rate.      Pulses:           Carotid pulses are 2+ on the right side and 2+ on the left side.       Radial pulses are 2+ on the right side and 2+ on the left side.        Dorsalis pedis pulses are 1+ on the right side and 1+ on the left side.        Posterior tibial pulses are 1+ on the right side and 1+ on the left side.      Heart sounds: Normal heart sounds. No murmur heard.     Comments: Intermittently paced rhythm  Pulmonary:      Effort: Pulmonary effort is normal. No respiratory distress.      Breath sounds: Normal breath sounds. No wheezing or rales.   Abdominal:      General: Abdomen is flat. Bowel sounds are normal. There is distension.      Palpations: Abdomen is soft.      Tenderness: There is no abdominal tenderness.   Musculoskeletal:         General: No signs of injury. Normal range of motion.      Cervical back: Normal range of motion.      Right lower leg:  Edema present.      Left lower leg: Edema present.   Skin:     General: Skin is warm and dry.      Coloration: Skin is pale.   Neurological:      General: No focal deficit present.      Mental Status: She is alert.      Sensory: No sensory deficit.      Motor: No weakness.   Psychiatric:         Mood and Affect: Mood normal.         Behavior: Behavior normal.         Thought Content: Thought content normal.      Significant Labs: All pertinent lab results from the last 24 hours have been reviewed.    Significant Imaging:  reviewed  Assessment & Plan  Complete heart block  Shock  Ms. Misa Barajas is a 80 yo female with COPD on 2L home O2, HFpEF, AFL s/p RFA (6/17/24 w/ Dr. Church), CKD III, HTN, HLD, chronic venous insufficiency, orthostatic hypotension who was brought via EMS after per history she was found confused and bradycardiac with HR in the 20s.They started epi and transcutaneous pacing. They had to bag her in route. We do not have strip of underlying rhythm. Patient obtunded for which she was intubated on arrival to the ED and transcutaneous pacing was exchanged to transvenous pacing via RIJ. Underlying rate 44 bifascicular block. Known RBBB.    -Unclear etiology ddx include sepsis, mesenteric ischemia?  - elevated lactic acid. Awaiting CT h/c/a/p  - bradycardia most likely in the setting of electrolyte disturbance and abnormal thyroid function  - correcting electrolyte disturbance. Suspect most likely she will then not require tvp.   - obtaining bcx and ucx. Broad spectrum abx ppx and deescalating as needed.   - orosco placement  RBBB  Chronic   CKD (chronic kidney disease) stage 3, GFR 30-59 ml/min  Now MARQUEZ on CKD3. Nephrology consulted. Patient on bumex and potassium supplement as home which have been held  Centrilobular emphysema  Prior history of smoking. Continue scheduled inhalers once extubated. Antibiotics and Supplemental oxygen and monitor respiratory status closely.   Chronic respiratory  "failure with hypoxia  Patient with Hypoxic Respiratory failure which is Chronic.  she is on home oxygen at 2 LPM. Supplemental oxygen was provided and noted- Oxygen Concentration (%):  [28] 28  Resp Rate Total:  [17 br/min-19 br/min] 19 br/min     Signs/symptoms of respiratory failure include- tachypnea, increased work of breathing, use of accessory muscles, and lethargy. Contributing diagnoses includes - underlying COPD   MARQUEZ (acute kidney injury)  .BMP reviewed- noted Estimated Creatinine Clearance: 19.6 mL/min (A) (based on SCr of 2.1 mg/dL (H)). according to latest data. Based on current GFR, CKD stage is stage 4 - GFR 15-29.  Monitor UOP and serial BMP and adjust therapy as needed. Renally dose meds. Avoid nephrotoxic medications and procedures.  Recent Labs     04/08/25  0328 04/09/25  0025 04/09/25  0837   CREATININE 1.8* 1.7* 1.6*   EGFRNORACEVR 28* 30* 32*     - correcting electrolytes as needed. Unclear if hypercalcemia was secondary to medications but repeating levels  Acute on chronic congestive heart failure   Most recent BNP and echo results are listed below.  No results for input(s): "BNP" in the last 72 hours.    Latest ECHO  Results for orders placed during the hospital encounter of 01/29/25    Echo    Interpretation Summary    Left Ventricle: The left ventricle is normal in size. Normal wall thickness. Normal wall motion. There is normal systolic function. Ejection fraction is approximately 55%.    Right Ventricle: Normal right ventricular cavity size. Wall thickness is normal. Systolic function is borderline low.    Left Atrium: Left atrium is severely dilated.    Mitral Valve: There is moderate posterior mitral annular calcification present.    Tricuspid Valve: There is mild regurgitation.    Pulmonary Artery: The estimated pulmonary artery systolic pressure is 51 mmHg.    IVC/SVC: Elevated venous pressure at 15 mmHg.    Current Heart Failure Medications: have been held due to bradycardia and " MARQUEZ       Plan  - Monitor strict I&Os and daily weights.    - Place on telemetry  - repeating official echo, bedside echo appears to have normal LVEF, IVC cannot be assessed as patient intubated  Orthostatic hypotension  holding midodrine and per history patient had not required it since last hospitalization  Iron deficiency anemia due to chronic blood loss  Anemia is likely due to chronic blood loss. Most recent hemoglobin and hematocrit are listed below.  Recent Labs     04/07/25  0318 04/08/25  0328 04/09/25  0221   HGB 12.5 12.5 11.3*   HCT 38.6 37.6 35.6*     Plan  - Monitor serial CBC: Daily  - Transfuse PRBC if patient becomes hemodynamically unstable, symptomatic or H/H drops below 7/21.  - Patient has not received any PRBC transfusions to date  - Patient's anemia is currently stable  - holding iron supplementation while intubated  Subclinical hypothyroidism  TSH 10.722 with normal FT4. Consulting endo.      VTE Risk Mitigation (From admission, onward)           Ordered     IP VTE HIGH RISK PATIENT  Once         04/06/25 0512     Place sequential compression device  Until discontinued         04/06/25 0454                  Talha Salcido MD  Cardiology  [facility] - Card[facility]

## 2025-04-09 NOTE — PT/OT/SLP PROGRESS
"Speech Language Pathology Treatment    Patient Name:  Misa Barajas   MRN:  [id number]  Admitting Diagnosis: Complete heart block    Recommendations:                 General Recommendations:  Dysphagia therapy  Diet recommendations:  Minced & Moist Diet - IDDSI Level 5, Liquid Diet Level: Thin liquids - IDDSI Level 0   Aspiration Precautions: Meds crushed in puree, No straws, Small bites/sips, and Standard aspiration precautions   General Precautions: Standard, aspiration  Communication strategies:  none    Assessment:     Misa Barajas is a 79 y.o. female with an SLP diagnosis of Dysphagia.      Subjective     Awake/alert  "It feels better" regarding swallow function from eval    Pain/Comfort:  Pain Rating 1: 0/10  Pain Rating Post-Intervention 1: 0/10    Respiratory Status: nasal cannula 2L    Objective:     Has the patient been evaluated by SLP for swallowing?   Yes  Keep patient NPO? No     Pt repositioned upright in bed for PO trials. Voice with noted improvement from previous session, but remains hoarse and breathy. She tolerated puree x4, thin liquids via cup x8 and cracker x1/4 with timely swallow initiation, slight grimace during swallow, and no overt signs of airway compromise. Pt with dry cough noted x2 intermittently between trials, but not indicative of dysphagia. SLP educated pt on swallow precautions, diet recommendations and SLP POC. She verbalized understanding. Recommend minced and moist diet with thin liquids at this time. SLP will follow up to ensure diet tolerance.     Goals:   Multidisciplinary Problems       SLP Goals          Problem: SLP    Goal Priority Disciplines Outcome   SLP Goal     SLP    Description: Speech Language Pathology Goals  Goals expected to be met by 4/17    1. Pt will participate in ongoing swallow assessment                                Plan:     Patient to be seen:  4 x/week   Plan of Care reviewed with:  patient   SLP Follow-Up:  Yes       Discharge " recommendations:    TBD      Time Tracking:     SLP Treatment Date:   04/09/25  Speech Start Time:  0720  Speech Stop Time:  0729     Speech Total Time (min):  9 min    Billable Minutes: Treatment Swallowing Dysfunction 9    04/09/2025

## 2025-04-09 NOTE — PLAN OF CARE
CICU DAILY GOALS   CVP 2,3,2   SVO2 78  TVP in place  LR Bolus given   Multiple bowl movements          A: Awake    RASS: Goal -    Actual - RASS (Manjarrez Agitation-Sedation Scale): alert and calm   Restraint necessity: Clinical Justification: Removing medical devices, Treatment Interference  B: Breath   SBT: Not intubated   C: Coordinate A & B, analgesics/sedatives   Pain: managed    SAT: Not intubated  D: Delirium   CAM-ICU:    E: Early(intubated/ Progressive (non-intubated) Mobility   MOVE Screen: Pass   Activity: Activity Management: Rolling - L1  FAS: Feeding/Nutrition   Diet order: Diet/Nutrition Received: NPO,   Fluid restriction:     Nutritional Supplement Intake: Quantity 0, Type:   T: Thrombus   DVT prophylaxis: VTE Core Measure: Pharmacological prophylaxis initiated/maintained  H: HOB Elevation   Head of Bed (HOB) Positioning: HOB at 20-30 degrees  U: Ulcer Prophylaxis   GI: yes  G: Glucose control   managed    S: Skin   Bathing/Skin Care: bath, partial;patient refused (refusal to bath, able to wipe near lines and do orosco care) (04/08/25 1657)  Wounds: No  Wound care consulted: No  B: Bowel Function   no issues   I: Indwelling Catheters   Orosco necessity:      Urethral Catheter 04/06/25 0645-Reason for Continuing Urinary Catheterization: Critically ill in ICU and requiring hourly monitoring of intake/output   CVC necessity: Yes  D: De-escalation Antibx   Yes  Plan for the day     Family/Goals of care/Code Status   Code Status: Full Code    Problem: Adult Inpatient Plan of Care  Goal: Plan of Care Review  Outcome: Progressing

## 2025-04-09 NOTE — PROGRESS NOTES
[facility]  Cardiac Electrophysiology  Progress Note    Admission Date: 4/6/2025  Code Status: Full Code   Attending Physician: Honorio Ortiz MD   Expected Discharge Date: 4/11/2025  Principal Problem:Complete heart block    Subjective:     Interval History: NAEON. No acute complaints. Underlying rhythm with NSR w/ intermittent CHB. TVP threshold 0.6 after adjustment of wire (moved to 39cm from 35cm). States she needs to think about wanting a pacemaker.     ROS 12 point ROS negative except for HPI  Objective:     Vital Signs (Most Recent):  Temp: 98.4 °F (36.9 °C) (04/09/25 1505)  Pulse: 60 (04/09/25 1505)  Resp: 15 (04/09/25 1505)  BP: (!) 129/58 (04/09/25 1505)  SpO2: 99 % (04/09/25 1505) Vital Signs (24h Range):  Temp:  [96.6 °F (35.9 °C)-98.6 °F (37 °C)] 98.4 °F (36.9 °C)  Pulse:  [55-94] 60  Resp:  [11-19] 15  SpO2:  [94 %-100 %] 99 %  BP: ()/(41-64) 129/58  Arterial Line BP: ()/(35-48) 148/42     Weight: 75 kg (165 lb 5.5 oz)  Body mass index is 28.38 kg/m².     SpO2: 99 %        Physical Exam  Constitutional:       General: She is not in acute distress.  Neck:      Comments: TVP and TLC  Cardiovascular:      Rate and Rhythm: Normal rate and regular rhythm.      Comments: Intermittently paced rhythm    Pulmonary:      Effort: Pulmonary effort is normal.      Breath sounds: Normal breath sounds.   Abdominal:      General: Bowel sounds are normal.      Palpations: Abdomen is soft.   Skin:     General: Skin is warm.   Neurological:      Mental Status: She is alert and oriented to person, place, and time.            Significant Labs: All pertinent lab results from the last 24 hours have been reviewed.    Significant Imaging: Echocardiogram: Transthoracic echo (TTE) complete (Cupid Only):   Results for orders placed or performed during the hospital encounter of 04/06/25   Echo   Result Value Ref Range    LV Diastolic Volume 69 mL    Echo EF Estimated 60 %    LV Systolic  Volume 28 mL    IVS 0.8 0.6 - 1.1 cm    LVIDd 4.0 3.5 - 6.0 cm    LVIDs 2.7 2.1 - 4.0 cm    LVOT diameter 1.9 cm    PW 0.9 0.6 - 1.1 cm    AV LVOT peak gradient 3 mmHg    LVOT mn grad 2 mmHg    LVOT peak norberto 0.9 m/s    LVOT peak VTI 16.0 cm    RV- razo basal diam 2.5 cm    LA size 3.9 cm    Left Atrium Major Axis 4.3 cm    Left Atrium Minor Axis 4.0 cm    LA Vol (MOD) 32 mL    RA Major [place] 4.15 cm    AV valve area 1.8 cm2    AV area by cont VTI 1.8 cm2    AV peak gradient 7 mmHg    AV mean gradient 4 mmHg    Ao peak norberto 1.3 m/s    Ao VTI 24.8 cm    MV Peak A Norberto 1.19 m/s    E wave deceleration time 330 ms    MV Peak E Norberto 0.77 m/s    E/A ratio 0.65     LV LATERAL E/E' RATIO 25.7     LV SEPTAL E/E' RATIO 15.4     TDI LATERAL 0.03 m/s    TDI SEPTAL 0.05 m/s    MV peak gradient 5 mmHg    MV mean gradient 2 mmHg    TV peak gradient 22 mmHg    TR Max Norberto 2.3 m/s    Ascending aorta 2.61 cm    STJ 2.30 cm    Sinus 2.95 cm    LA WIDTH 2.4 cm    RA Width 3.25 cm    TAPSE 1.53 cm    BSA 1.85 m2    LVOT stroke volume 45.3 cm3    LV Systolic Volume Index 15.5 mL/m2    LV Diastolic Volume Index 38.12 mL/m2    LVOT area 2.8 cm2    FS 32.5 28 - 44 %    Left Ventricle Relative Wall Thickness 0.45 cm    LV mass 101.4 g    LV Mass Index 56.0 g/m2    E/E' ratio 19 m/s    TOMÁS 18 mL/m2    LA Vol 33 cm3    RV/LV Ratio 0.63 cm    TOMÁS (MOD) 18 mL/m2    AV Velocity Ratio 0.69     AV index (prosthetic) 0.65     KHOI by Velocity Ratio 2.0 cm²    Mean e' 0.04 m/s    ZLVIDS -1.08     ZLVIDD -2.25     Narrative      Left Ventricle: The left ventricle is normal in size. Normal wall   thickness. There is low normal systolic function with a visually estimated   ejection fraction of 50 - 55%. Grade II diastolic dysfunction.    Right Ventricle: The right ventricle is normal in size. Wall thickness   is normal. Systolic function is normal.    IVC/SVC: Patient is ventilated, cannot use IVC diameter to estimate   right atrial pressure.        Assessment and Plan:     * Complete heart block  Ms. Misa Barajas is a 78 yo female with COPD on 2L home O2, HFpEF, AFL s/p RFA (6/17/24 w/ Dr. Church), CKD III, HTN, HLD, chronic venous insufficiency, orthostatic hypotension who was admitted to the hospital for complete heart block and shock. EP consulted for pacemaker evaluation.     Recommendation:   - Patient noted to intermittently be NSR. Continue monitoring with TVP. Threshold 0.6 (0.8 yesterday)  - Patient unsure whether she want a pacemaker. Needs some more time to decide. Make NPO at midnight for now in case she decided by tomorrow.   - Hold any AV melissa blocking agents.   - For full recommendations, please see staff attestation.         Yajaira Menjivar MD  Cardiac Electrophysiology  [facility]

## 2025-04-09 NOTE — CONSULTS
[facility]  Wound Care    Patient Name:  Misa Barajas   MRN:  [id number]  Date: 4/9/2025  Diagnosis: Complete heart block    Pt seen for wound consult- MASD to groin area. Pt sleeping and briefly woke to name being called. Miconazole ointment had been applied; primary nurse at bedside and stated he applied the ointment. Suggest to continue using miconazole BID and prn. Per chart review, pt is noted to have had MASD to groin area since 8/24.    AMARJIT Jean, RN,[facility] Manuel antwan Wound/Ostomy  4/9/25 04/09/25 1040   WOCN Assessment   WOCN Total Time (mins) 30   Visit Date 04/09/25   Visit Time 1040   Consult Type New   WOCN Speciality Wound   Wound moisture;yeast   Number of Wounds   (pt only seen for MASD)   Intervention assessed;chart review;coordination of care;orders   Teaching on-going        Wound 08/09/24 Moisture associated dermatitis Right Groin   Date First Assessed: 08/09/24   Present on Original Admission: Yes  Primary Wound Type: Moisture associated dermatitis  Side: Right  Location: Groin   Dressing Appearance Open to air;No dressing   Drainage Amount None   Drainage Characteristics/Odor No odor   Appearance Pink;Other (see comments)  (ointment in place)     History:     Past Medical History:   Diagnosis Date    Acute biliary pancreatitis 07/27/2024    Anemia 07/12/2024    Aortic atherosclerosis     Arthritis     knee joint pain    Asthma-COPD overlap syndrome 08/22/2022    home o2    Breast cancer 2002    left breast & lymph nodes-s/p sx with chemo    Cataracts, bilateral     Chronic diastolic heart failure 12/24/2019    Chronic kidney disease, stage 3     Class 1 obesity due to excess calories with serious comorbidity and body mass index (BMI) of 30.0 to 30.9 in adult 05/16/2024    History of chemotherapy     last treatment 12/2002 (had 8 treatments)    Hyperlipidemia 11/20/2016    Hypertension     Lymphedema of both lower extremities 01/25/2022    Nephrolithiasis  06/02/2014    Osteoporosis     Paroxysmal atrial fibrillation 06/07/2021    Renal osteodystrophy 01/14/2016    Shock 4/6/2025    Subclinical hypothyroidism 4/6/2025    Venous stasis dermatitis of both lower extremities 01/25/2022    Vitamin D insufficiency        Social History[1]    Precautions:     Allergies as of 04/06/2025 - Reviewed 04/06/2025   Allergen Reaction Noted    Adhesive tape-silicones  03/04/2024    Adhesive Rash 05/30/2014       WOC Assessment Details/Treatment   See above       [1]   Social History  Socioeconomic History    Marital status:    Tobacco Use    Smoking status: Former     Current packs/day: 0.00     Average packs/day: 1 pack/day for 50.0 years (50.0 ttl pk-yrs)     Types: Cigarettes     Start date: 1960     Quit date: 5/20/2009     Years since quitting: 15.8    Smokeless tobacco: Former   Substance and Sexual Activity    Alcohol use: No    Drug use: No    Sexual activity: Not Currently     Partners: Male     Birth control/protection: Partner-Vasectomy     Social Drivers of Health     Financial Resource Strain: Patient Unable To Answer (4/7/2025)    Overall Financial Resource Strain (CARDIA)     Difficulty of Paying Living Expenses: Patient unable to answer   Food Insecurity: Patient Unable To Answer (4/7/2025)    Hunger Vital Sign     Worried About Running Out of Food in the Last Year: Patient unable to answer     Ran Out of Food in the Last Year: Patient unable to answer   Transportation Needs: Patient Unable To Answer (4/6/2025)    PRAPARE - Transportation     Lack of Transportation (Medical): Patient unable to answer     Lack of Transportation (Non-Medical): Patient unable to answer   Physical Activity: Insufficiently Active (2/17/2025)    Exercise Vital Sign     Days of Exercise per Week: 2 days     Minutes of Exercise per Session: 30 min   Stress: Patient Unable To Answer (4/7/2025)    [employer] [place] [employer] - Occupational Stress Questionnaire     Feeling  of Stress : Patient unable to answer   Housing Stability: Patient Unable To Answer (4/7/2025)    Housing Stability Vital Sign     Unable to Pay for Housing in the Last Year: Patient unable to answer     Homeless in the Last Year: Patient unable to answer

## 2025-04-09 NOTE — CARE UPDATE
CCU Care Update Note:    Hemodynamics@8pm:  Date CVP SVO2 CO/CI SVR PAP PCWP Susie    4/8 2 78 7.12/3.87 786          - MAP uppers 50's and low 60's due to low diastolic pressure      Continuous Infusions:   0.9% NaCl   Intravenous Continuous 10 mL/hr at 04/08/25 2001 Rate Verify at 04/08/25 2001    nitroGLYCERIN  0-400 mcg/min Intravenous Continuous   Stopped at 04/08/25 1620       Intake/Output Summary (Last 24 hours) at 4/8/2025 2041  Last data filed at 4/8/2025 2001  Gross per 24 hour   Intake 1074.09 ml   Output 810 ml   Net 264.09 ml           Plan:  [ ] 250cc's LR over 2 hours      Discussed with CCU Attending      Manuel Trevino MD, MPH  Cardiovascular Disease PGY IV  [facility]

## 2025-04-09 NOTE — PLAN OF CARE
CICU DAILY GOALS     CVP 3 2 2   SvO2 59  2x hypoglycemic on glucose checks, asymptomatic, treated per orders. Discussed with patient importance of food intake.  Discussed plan of pacemaker with team and daughters, patient is agreeable to getting a pacemaker, daughters would like to be notified/ at hospital prior to her being taken for pacemaker if tomorrow is     A: Awake    RASS: Goal -    Actual - RASS (Manjarrez Agitation-Sedation Scale): alert and calm   Restraint necessity: Clinical Justification: Removing medical devices, Treatment Interference  B: Breath   SBT: Not intubated   C: Coordinate A & B, analgesics/sedatives   Pain: managed    SAT: Not intubated  D: Delirium   CAM-ICU:    E: Early(intubated/ Progressive (non-intubated) Mobility   MOVE Screen: Fail   Activity: Activity Management: Rolling - L1  FAS: Feeding/Nutrition   Diet order: Diet/Nutrition Received: NPO,   Fluid restriction:     Nutritional Supplement Intake: Quantity 0, Type:  none  T: Thrombus   DVT prophylaxis: VTE Core Measure: Pharmacological prophylaxis initiated/maintained  H: HOB Elevation   Head of Bed (HOB) Positioning: HOB at 30-45 degrees  U: Ulcer Prophylaxis   GI: yes  G: Glucose control   managed    S: Skin   Bathing/Skin Care: back care;bath, partial (04/09/25 1105)  Wounds: Yes  Wound care consulted: Yes  B: Bowel Function   no issues   I: Indwelling Catheters   Muniz necessity:      Urethral Catheter 04/06/25 0634-Reason for Continuing Urinary Catheterization: Critically ill in ICU and requiring hourly monitoring of intake/output   CVC necessity: Yes  D: De-escalation Antibx   Yes  Family/Goals of care/Code Status   Code Status: Full Code

## 2025-04-09 NOTE — ASSESSMENT & PLAN NOTE
.BMP reviewed- noted Estimated Creatinine Clearance: 19.6 mL/min (A) (based on SCr of 2.1 mg/dL (H)). according to latest data. Based on current GFR, CKD stage is stage 4 - GFR 15-29.  Monitor UOP and serial BMP and adjust therapy as needed. Renally dose meds. Avoid nephrotoxic medications and procedures.  Recent Labs     04/08/25  0328 04/09/25  0025 04/09/25  0837   CREATININE 1.8* 1.7* 1.6*   EGFRNORACEVR 28* 30* 32*     - correcting electrolytes as needed. Unclear if hypercalcemia was secondary to medications but repeating levels

## 2025-04-09 NOTE — ASSESSMENT & PLAN NOTE
Misa Barajas is a 79 y.o. female ,is admitted on 4/6/2025  with past medical history of   COPD on 2L home O2, HFpEF, AFL s/p RFA (6/17/24 w/ Dr. Church), CKD III, HTN, HLD, chronic venous insufficiency, orthostatic hypotension.     Admitted with bradycardia and hypotension, requiring pacing and pressors  Nephrology  is consulted for MARQUEZ on CKD     Impression:  MARQUEZ on CKD 3    Baseline Creatinine: 1.2-1.4  Creatinine at time of consult: 2.1, stable at 1.8 for several days  Tests done: UA neg  MARQUEZ 2/2 iATN from bradycardia/hemodynamic changes.        Recommendations :   -No urgent indication for RRT. 24 UOP 935mL. Electrolytes stable.   -no need for lasix at this time, continue trending I/O's; CVP.   -Monitor serum chemistries daily,  -Avoid nephrotoxic medications (NSAID, IV contrast)  -Avoid ACEi and ARBs in the setting of MARQUEZ  -Maintain MAP > 65  -Transfuse for Hb <7

## 2025-04-09 NOTE — ASSESSMENT & PLAN NOTE
Ms. Misa Barajas is a 80 yo female with COPD on 2L home O2, HFpEF, AFL s/p RFA (6/17/24 w/ Dr. Church), CKD III, HTN, HLD, chronic venous insufficiency, orthostatic hypotension who was admitted to the hospital for complete heart block and shock. EP consulted for pacemaker evaluation.     Recommendation:   - Patient noted to intermittently be NSR. Continue monitoring with TVP. Threshold 0.6 (0.8 yesterday)  - Patient unsure whether she want a pacemaker. Needs some more time to decide. Make NPO at midnight for now in case she decided by tomorrow.   - Hold any AV melissa blocking agents.   - For full recommendations, please see staff attestation.

## 2025-04-09 NOTE — SUBJECTIVE & OBJECTIVE
Interval History:   Doing well this morning, receiving IVFs at time of eval due to low MAPs and low CVP.  Asymptomatic.  Oxygenating well in NAD.   ml/24h.  Kidney function stable., electrolytes non-emergent.    Review of patient's allergies indicates:   Allergen Reactions    Adhesive tape-silicones     Adhesive Rash     Pt states she removed a LATEX bandaid and the skin beneath was swollen and red. No other latex causes a reaction.     Current Facility-Administered Medications   Medication Frequency    0.9% NaCl infusion Continuous    amoxicillin-pot clavulanate 250-62.5 mg/5ml suspension 500 mg Q12H    dextrose 50% injection 12.5 g PRN    dextrose 50% injection 25 g PRN    glucagon (human recombinant) injection 1 mg PRN    glucose chewable tablet 16 g PRN    glucose chewable tablet 24 g PRN    [START ON 4/10/2025] levothyroxine tablet 50 mcg Before breakfast    LIDOcaine 5 % patch 1 patch Q24H    miconazole nitrate 2% ointment BID    mupirocin 2 % ointment BID    polyethylene glycol packet 17 g BID    senna tablet 8.6 mg Daily    sodium chloride 0.9% flush 10 mL PRN       Objective:     Vital Signs (Most Recent):  Temp: 98.6 °F (37 °C) (04/09/25 1105)  Pulse: 60 (04/09/25 1105)  Resp: 18 (04/09/25 1105)  BP: (!) 148/64 (04/09/25 1105)  SpO2: 99 % (04/09/25 1105) Vital Signs (24h Range):  Temp:  [96.6 °F (35.9 °C)-98.6 °F (37 °C)] 98.6 °F (37 °C)  Pulse:  [55-94] 60  Resp:  [11-19] 18  SpO2:  [94 %-100 %] 99 %  BP: ()/(41-64) 148/64  Arterial Line BP: ()/(35-48) 148/42     Weight: 75 kg (165 lb 5.5 oz) (04/09/25 0601)  Body mass index is 28.38 kg/m².  Body surface area is 1.84 meters squared.    I/O last 3 completed shifts:  In: 2413.7 [I.V.:630.4; NG/GT:270; IV Piggyback:1513.3]  Out: 1215 [Urine:1215]     Physical Exam  Vitals and nursing note reviewed.   Constitutional:       Appearance: She is ill-appearing.   HENT:      Head: Atraumatic.   Eyes:      General: No scleral icterus.        Right  eye: No discharge.         Left eye: No discharge.      Extraocular Movements: Extraocular movements intact.      Conjunctiva/sclera: Conjunctivae normal.   Cardiovascular:      Rate and Rhythm: Normal rate.      Comments: TVP in place  Pulmonary:      Effort: No tachypnea, accessory muscle usage or respiratory distress.      Breath sounds: Normal breath sounds. No wheezing or rales.   Musculoskeletal:      Right lower leg: Edema present.      Left lower leg: Edema present.   Skin:     General: Skin is warm.   Neurological:      Mental Status: She is alert.          Significant Labs:  CBC:   Recent Labs   Lab 04/09/25  0221   WBC 11.34   RBC 3.99*   HGB 11.3*   HCT 35.6*   *   MCV 89   MCH 28.3   MCHC 31.7*     CMP:   Recent Labs   Lab 04/08/25  0328 04/09/25  0025 04/09/25  0837   CALCIUM 9.4   < > 9.0   ALBUMIN 2.1*  --   --       < > 141   K 3.7   < > 4.2   CO2 18*   < > 20*      < > 114*   BUN 35*   < > 35*   CREATININE 1.8*   < > 1.6*   ALKPHOS 93  --   --    ALT 17  --   --    AST 25  --   --    BILITOT 0.8  --   --     < > = values in this interval not displayed.

## 2025-04-09 NOTE — PROGRESS NOTES
[facility]  Nephrology  Progress Note    Patient Name: Misa Barajas  MRN: [id number]  Admission Date: 4/6/2025  Hospital Length of Stay: 3 days  Attending Provider: Honorio Ortiz MD   Primary Care Physician: Is[facility] Langston MD  Principal Problem:Complete heart block    Subjective:     Interval History:   Doing well this morning, receiving IVFs at time of eval due to low MAPs and low CVP.  Asymptomatic.  Oxygenating well in NAD.   ml/24h.  Kidney function stable., electrolytes non-emergent.    Review of patient's allergies indicates:   Allergen Reactions    Adhesive tape-silicones     Adhesive Rash     Pt states she removed a LATEX bandaid and the skin beneath was swollen and red. No other latex causes a reaction.     Current Facility-Administered Medications   Medication Frequency    0.9% NaCl infusion Continuous    amoxicillin-pot clavulanate 250-62.5 mg/5ml suspension 500 mg Q12H    dextrose 50% injection 12.5 g PRN    dextrose 50% injection 25 g PRN    glucagon (human recombinant) injection 1 mg PRN    glucose chewable tablet 16 g PRN    glucose chewable tablet 24 g PRN    [START ON 4/10/2025] levothyroxine tablet 50 mcg Before breakfast    LIDOcaine 5 % patch 1 patch Q24H    miconazole nitrate 2% ointment BID    mupirocin 2 % ointment BID    polyethylene glycol packet 17 g BID    senna tablet 8.6 mg Daily    sodium chloride 0.9% flush 10 mL PRN       Objective:     Vital Signs (Most Recent):  Temp: 98.6 °F (37 °C) (04/09/25 1105)  Pulse: 60 (04/09/25 1105)  Resp: 18 (04/09/25 1105)  BP: (!) 148/64 (04/09/25 1105)  SpO2: 99 % (04/09/25 1105) Vital Signs (24h Range):  Temp:  [96.6 °F (35.9 °C)-98.6 °F (37 °C)] 98.6 °F (37 °C)  Pulse:  [55-94] 60  Resp:  [11-19] 18  SpO2:  [94 %-100 %] 99 %  BP: ()/(41-64) 148/64  Arterial Line BP: ()/(35-48) 148/42     Weight: 75 kg (165 lb 5.5 oz) (04/09/25 0601)  Body mass index is 28.38 kg/m².  Body surface area is 1.84  meters squared.    I/O last 3 completed shifts:  In: 2413.7 [I.V.:630.4; NG/GT:270; IV Piggyback:1513.3]  Out: 1215 [Urine:1215]     Physical Exam  Vitals and nursing note reviewed.   Constitutional:       Appearance: She is ill-appearing.   HENT:      Head: Atraumatic.   Eyes:      General: No scleral icterus.        Right eye: No discharge.         Left eye: No discharge.      Extraocular Movements: Extraocular movements intact.      Conjunctiva/sclera: Conjunctivae normal.   Cardiovascular:      Rate and Rhythm: Normal rate.      Comments: TVP in place  Pulmonary:      Effort: No tachypnea, accessory muscle usage or respiratory distress.      Breath sounds: Normal breath sounds. No wheezing or rales.   Musculoskeletal:      Right lower leg: Edema present.      Left lower leg: Edema present.   Skin:     General: Skin is warm.   Neurological:      Mental Status: She is alert.          Significant Labs:  CBC:   Recent Labs   Lab 04/09/25  0221   WBC 11.34   RBC 3.99*   HGB 11.3*   HCT 35.6*   *   MCV 89   MCH 28.3   MCHC 31.7*     CMP:   Recent Labs   Lab 04/08/25  0328 04/09/25  0025 04/09/25  0837   CALCIUM 9.4   < > 9.0   ALBUMIN 2.1*  --   --       < > 141   K 3.7   < > 4.2   CO2 18*   < > 20*      < > 114*   BUN 35*   < > 35*   CREATININE 1.8*   < > 1.6*   ALKPHOS 93  --   --    ALT 17  --   --    AST 25  --   --    BILITOT 0.8  --   --     < > = values in this interval not displayed.        Assessment/Plan:     Renal/  MARQUEZ (acute kidney injury)  Misa Barajas is a 79 y.o. female ,is admitted on 4/6/2025  with past medical history of   COPD on 2L home O2, HFpEF, AFL s/p RFA (6/17/24 w/ Dr. Church), CKD III, HTN, HLD, chronic venous insufficiency, orthostatic hypotension.     Admitted with bradycardia and hypotension, requiring pacing and pressors  Nephrology  is consulted for MARQUEZ on CKD     Impression:  MARQUEZ on CKD 3    Baseline Creatinine: 1.2-1.4  Creatinine at time of consult: 2.1,  stable at 1.8 for several days  Tests done: UA neg  MARQUEZ 2/2 iATN from bradycardia/hemodynamic changes.        Recommendations :   -No urgent indication for RRT. 24 UOP 935mL. Electrolytes stable.   -no need for lasix at this time, continue trending I/O's; CVP.   -Monitor serum chemistries daily,  -Avoid nephrotoxic medications (NSAID, IV contrast)  -Avoid ACEi and ARBs in the setting of MARQUEZ  -Maintain MAP > 65  -Transfuse for Hb <7      Ethan Roque NP  Nephrology  [facility]

## 2025-04-09 NOTE — ASSESSMENT & PLAN NOTE
Patient with Hypoxic Respiratory failure which is Chronic.  she is on home oxygen at 2 LPM. Supplemental oxygen was provided and noted- Oxygen Concentration (%):  [28] 28  Resp Rate Total:  [17 br/min-19 br/min] 19 br/min     Signs/symptoms of respiratory failure include- tachypnea, increased work of breathing, use of accessory muscles, and lethargy. Contributing diagnoses includes - underlying COPD

## 2025-04-09 NOTE — NURSING
MD ybarra notified for arterial map of 55, NIBP map of 65.   Per Uriel, maintain SBP goal of less than 160 and Map of greater than 60 on the arterial line.   Pressures recovered with an arterial map of 64.  LR Bolus given

## 2025-04-09 NOTE — NURSING
Pt intermittently bradycardic in high 40s, Uriel RAND at bedside. TVP rate increased from 50 to 60.

## 2025-04-09 NOTE — ASSESSMENT & PLAN NOTE
Anemia is likely due to chronic blood loss. Most recent hemoglobin and hematocrit are listed below.  Recent Labs     04/07/25  0318 04/08/25  0328 04/09/25  0221   HGB 12.5 12.5 11.3*   HCT 38.6 37.6 35.6*     Plan  - Monitor serial CBC: Daily  - Transfuse PRBC if patient becomes hemodynamically unstable, symptomatic or H/H drops below 7/21.  - Patient has not received any PRBC transfusions to date  - Patient's anemia is currently stable  - holding iron supplementation while intubated

## 2025-04-09 NOTE — SUBJECTIVE & OBJECTIVE
Interval History: NAEON. No acute complaints. Underlying rhythm with NSR w/ intermittent CHB. TVP threshold 0.6 after adjustment of wire (moved to 39cm from 35cm). States she needs to think about wanting a pacemaker.     ROS 12 point ROS negative except for HPI  Objective:     Vital Signs (Most Recent):  Temp: 98.4 °F (36.9 °C) (04/09/25 1505)  Pulse: 60 (04/09/25 1505)  Resp: 15 (04/09/25 1505)  BP: (!) 129/58 (04/09/25 1505)  SpO2: 99 % (04/09/25 1505) Vital Signs (24h Range):  Temp:  [96.6 °F (35.9 °C)-98.6 °F (37 °C)] 98.4 °F (36.9 °C)  Pulse:  [55-94] 60  Resp:  [11-19] 15  SpO2:  [94 %-100 %] 99 %  BP: ()/(41-64) 129/58  Arterial Line BP: ()/(35-48) 148/42     Weight: 75 kg (165 lb 5.5 oz)  Body mass index is 28.38 kg/m².     SpO2: 99 %        Physical Exam  Constitutional:       General: She is not in acute distress.  Neck:      Comments: TVP and TLC  Cardiovascular:      Rate and Rhythm: Normal rate and regular rhythm.      Comments: Intermittently paced rhythm    Pulmonary:      Effort: Pulmonary effort is normal.      Breath sounds: Normal breath sounds.   Abdominal:      General: Bowel sounds are normal.      Palpations: Abdomen is soft.   Skin:     General: Skin is warm.   Neurological:      Mental Status: She is alert and oriented to person, place, and time.            Significant Labs: All pertinent lab results from the last 24 hours have been reviewed.    Significant Imaging: Echocardiogram: Transthoracic echo (TTE) complete (Cupid Only):   Results for orders placed or performed during the hospital encounter of 04/06/25   Echo   Result Value Ref Range    LV Diastolic Volume 69 mL    Echo EF Estimated 60 %    LV Systolic Volume 28 mL    IVS 0.8 0.6 - 1.1 cm    LVIDd 4.0 3.5 - 6.0 cm    LVIDs 2.7 2.1 - 4.0 cm    LVOT diameter 1.9 cm    PW 0.9 0.6 - 1.1 cm    AV LVOT peak gradient 3 mmHg    LVOT mn grad 2 mmHg    LVOT peak apurva 0.9 m/s    LVOT peak VTI 16.0 cm    RV- razo basal diam 2.5 cm     LA size 3.9 cm    Left Atrium Major Axis 4.3 cm    Left Atrium Minor Axis 4.0 cm    LA Vol (MOD) 32 mL    RA Major [place] 4.15 cm    AV valve area 1.8 cm2    AV area by cont VTI 1.8 cm2    AV peak gradient 7 mmHg    AV mean gradient 4 mmHg    Ao peak norberto 1.3 m/s    Ao VTI 24.8 cm    MV Peak A Norberto 1.19 m/s    E wave deceleration time 330 ms    MV Peak E Norberto 0.77 m/s    E/A ratio 0.65     LV LATERAL E/E' RATIO 25.7     LV SEPTAL E/E' RATIO 15.4     TDI LATERAL 0.03 m/s    TDI SEPTAL 0.05 m/s    MV peak gradient 5 mmHg    MV mean gradient 2 mmHg    TV peak gradient 22 mmHg    TR Max Norberto 2.3 m/s    Ascending aorta 2.61 cm    STJ 2.30 cm    Sinus 2.95 cm    LA WIDTH 2.4 cm    RA Width 3.25 cm    TAPSE 1.53 cm    BSA 1.85 m2    LVOT stroke volume 45.3 cm3    LV Systolic Volume Index 15.5 mL/m2    LV Diastolic Volume Index 38.12 mL/m2    LVOT area 2.8 cm2    FS 32.5 28 - 44 %    Left Ventricle Relative Wall Thickness 0.45 cm    LV mass 101.4 g    LV Mass Index 56.0 g/m2    E/E' ratio 19 m/s    TOMÁS 18 mL/m2    LA Vol 33 cm3    RV/LV Ratio 0.63 cm    TOMÁS (MOD) 18 mL/m2    AV Velocity Ratio 0.69     AV index (prosthetic) 0.65     KHOI by Velocity Ratio 2.0 cm²    Mean e' 0.04 m/s    ZLVIDS -1.08     ZLVIDD -2.25     Narrative      Left Ventricle: The left ventricle is normal in size. Normal wall   thickness. There is low normal systolic function with a visually estimated   ejection fraction of 50 - 55%. Grade II diastolic dysfunction.    Right Ventricle: The right ventricle is normal in size. Wall thickness   is normal. Systolic function is normal.    IVC/SVC: Patient is ventilated, cannot use IVC diameter to estimate   right atrial pressure.

## 2025-04-09 NOTE — ASSESSMENT & PLAN NOTE
" Most recent BNP and echo results are listed below.  No results for input(s): "BNP" in the last 72 hours.    Latest ECHO  Results for orders placed during the hospital encounter of 01/29/25    Echo    Interpretation Summary    Left Ventricle: The left ventricle is normal in size. Normal wall thickness. Normal wall motion. There is normal systolic function. Ejection fraction is approximately 55%.    Right Ventricle: Normal right ventricular cavity size. Wall thickness is normal. Systolic function is borderline low.    Left Atrium: Left atrium is severely dilated.    Mitral Valve: There is moderate posterior mitral annular calcification present.    Tricuspid Valve: There is mild regurgitation.    Pulmonary Artery: The estimated pulmonary artery systolic pressure is 51 mmHg.    IVC/SVC: Elevated venous pressure at 15 mmHg.    Current Heart Failure Medications: have been held due to bradycardia and MARQUEZ       Plan  - Monitor strict I&Os and daily weights.    - Place on telemetry  - repeating official echo, bedside echo appears to have normal LVEF, IVC cannot be assessed as patient intubated  "

## 2025-04-10 ENCOUNTER — ANESTHESIA EVENT (OUTPATIENT)
Dept: MEDSURG UNIT | Facility: HOSPITAL | Age: 80
End: 2025-04-10
Payer: MEDICARE

## 2025-04-10 ENCOUNTER — ANESTHESIA (OUTPATIENT)
Dept: MEDSURG UNIT | Facility: HOSPITAL | Age: 80
End: 2025-04-10
Payer: MEDICARE

## 2025-04-10 LAB
OHS QRS DURATION: 136 MS
OHS QRS DURATION: 150 MS
OHS QTC CALCULATION: 488 MS
OHS QTC CALCULATION: 504 MS

## 2025-04-10 PROCEDURE — 25000003 PHARM REV CODE 250: Performed by: NURSE ANESTHETIST, CERTIFIED REGISTERED

## 2025-04-10 PROCEDURE — 63600175 PHARM REV CODE 636 W HCPCS: Performed by: NURSE ANESTHETIST, CERTIFIED REGISTERED

## 2025-04-10 RX ORDER — HEPARIN SODIUM 1000 [USP'U]/ML
INJECTION, SOLUTION INTRAVENOUS; SUBCUTANEOUS
Status: DISCONTINUED | OUTPATIENT
Start: 2025-04-10 | End: 2025-04-10

## 2025-04-10 RX ORDER — CEFAZOLIN SODIUM 1 G/3ML
INJECTION, POWDER, FOR SOLUTION INTRAMUSCULAR; INTRAVENOUS
Status: DISCONTINUED | OUTPATIENT
Start: 2025-04-10 | End: 2025-04-10

## 2025-04-10 RX ORDER — PHENYLEPHRINE HCL IN 0.9% NACL 1 MG/10 ML
SYRINGE (ML) INTRAVENOUS
Status: DISCONTINUED | OUTPATIENT
Start: 2025-04-10 | End: 2025-04-10

## 2025-04-10 RX ORDER — PROPOFOL 10 MG/ML
VIAL (ML) INTRAVENOUS
Status: DISCONTINUED | OUTPATIENT
Start: 2025-04-10 | End: 2025-04-10

## 2025-04-10 RX ORDER — DEXMEDETOMIDINE HYDROCHLORIDE 100 UG/ML
INJECTION, SOLUTION INTRAVENOUS
Status: DISCONTINUED | OUTPATIENT
Start: 2025-04-10 | End: 2025-04-10

## 2025-04-10 RX ORDER — ONDANSETRON HYDROCHLORIDE 2 MG/ML
INJECTION, SOLUTION INTRAVENOUS
Status: DISCONTINUED | OUTPATIENT
Start: 2025-04-10 | End: 2025-04-10

## 2025-04-10 RX ADMIN — HEPARIN SODIUM 2500 UNITS: 1000 INJECTION, SOLUTION INTRAVENOUS; SUBCUTANEOUS at 01:04

## 2025-04-10 RX ADMIN — SODIUM CHLORIDE: 9 INJECTION, SOLUTION INTRAVENOUS at 12:04

## 2025-04-10 RX ADMIN — PROPOFOL 80 MCG/KG/MIN: 10 INJECTION, EMULSION INTRAVENOUS at 12:04

## 2025-04-10 RX ADMIN — PROPOFOL 30 MG: 10 INJECTION, EMULSION INTRAVENOUS at 12:04

## 2025-04-10 RX ADMIN — ONDANSETRON 4 MG: 2 INJECTION INTRAMUSCULAR; INTRAVENOUS at 01:04

## 2025-04-10 RX ADMIN — Medication 50 MCG: at 01:04

## 2025-04-10 RX ADMIN — CEFAZOLIN 1 G: 330 INJECTION, POWDER, FOR SOLUTION INTRAMUSCULAR; INTRAVENOUS at 01:04

## 2025-04-10 RX ADMIN — PHENYLEPHRINE HYDROCHLORIDE 0.4 MCG/KG/MIN: 10 INJECTION INTRAVENOUS at 01:04

## 2025-04-10 RX ADMIN — DEXMEDETOMIDINE 4 MCG: 200 INJECTION, SOLUTION INTRAVENOUS at 01:04

## 2025-04-10 RX ADMIN — Medication 100 MCG: at 01:04

## 2025-04-10 NOTE — ASSESSMENT & PLAN NOTE
Ms. Misa Barajas is a 80 yo female with COPD on 2L home O2, HFpEF, AFL s/p RFA (6/17/24 w/ Dr. Church), CKD III, HTN, HLD, chronic venous insufficiency, orthostatic hypotension who was brought via EMS after per history she was found confused and bradycardiac with HR in the 20s.They started epi and transcutaneous pacing. They had to bag her in route. We do not have strip of underlying rhythm. Patient obtunded for which she was intubated on arrival to the ED and transcutaneous pacing was exchanged to transvenous pacing via RIJ. Underlying rate 44 bifascicular block. Known RBBB.    -Unclear etiology ddx include sepsis, mesenteric ischemia?  - elevated lactic acid. Awaiting CT h/c/a/p  - bradycardia most likely in the setting of electrolyte disturbance and abnormal thyroid function  - correcting electrolyte disturbance. Suspect most likely she will then not require tvp.   - obtaining bcx and ucx. Broad spectrum abx ppx and deescalating as needed.   No cx growth, empiric tx for urosepsis w/ pip-tazo (start 04/06)  Complete amox-clav (EED 04/13) for 7 day course  - orosco placement  EP following  PPM tentatively planned for 04/10 (pending pt/family discussion)

## 2025-04-10 NOTE — ASSESSMENT & PLAN NOTE
Ms. Misa Barajas is a 80 yo female with COPD on 2L home O2, HFpEF, AFL s/p RFA (6/17/24 w/ Dr. Church), CKD III, HTN, HLD, chronic venous insufficiency, orthostatic hypotension who was admitted to the hospital for complete heart block and shock. EP consulted for pacemaker evaluation.     Recommendation:   - Threshold 0.8 (0.6 yesterday)  - Pending micra pacemaker today.   - NPO  - Hold any AV melissa blocking agents.   - For full recommendations, please see staff attestation.

## 2025-04-10 NOTE — PHYSICIAN QUERY
Provider, Due to Conflicting Documentation Please Clarify the       Respiratory    Diagnosis   Acute Respiratory Failure with Hypoxia and Hypercapnia

## 2025-04-10 NOTE — ASSESSMENT & PLAN NOTE
Ms. Misa Barajas is a 78 yo female with COPD on 2L home O2, HFpEF, AFL s/p RFA (6/17/24 w/ Dr. Church), CKD III, HTN, HLD, chronic venous insufficiency, orthostatic hypotension who was brought via EMS after per history she was found confused and bradycardiac with HR in the 20s.They started epi and transcutaneous pacing. They had to bag her in route. We do not have strip of underlying rhythm. Patient obtunded for which she was intubated on arrival to the ED and transcutaneous pacing was exchanged to transvenous pacing via RIJ. Underlying rate 44 bifascicular block. Known RBBB.    -Unclear etiology ddx include sepsis, mesenteric ischemia?  - elevated lactic acid. Awaiting CT h/c/a/p  - bradycardia most likely in the setting of electrolyte disturbance and abnormal thyroid function  - correcting electrolyte disturbance. Suspect most likely she will then not require tvp.   - obtaining bcx and ucx. Broad spectrum abx ppx and deescalating as needed.   No cx growth, empiric tx for urosepsis w/ pip-tazo (start 04/06)  Complete amox-clav (EED 04/13) for 7 day course  - orosco placement  EP following  PPM tentatively planned for 04/10 (pending pt/family discussion)

## 2025-04-10 NOTE — ASSESSMENT & PLAN NOTE
Patient with Hypoxic Respiratory failure which is Chronic.  she is on home oxygen at 2 LPM. Supplemental oxygen was provided and noted- Oxygen Concentration (%):  [28] 28     Signs/symptoms of respiratory failure include- tachypnea, increased work of breathing, use of accessory muscles, and lethargy. Contributing diagnoses includes - underlying COPD

## 2025-04-10 NOTE — ASSESSMENT & PLAN NOTE
.BMP reviewed- noted Estimated Creatinine Clearance: 19.6 mL/min (A) (based on SCr of 2.1 mg/dL (H)). according to latest data. Based on current GFR, CKD stage is stage 4 - GFR 15-29.  Monitor UOP and serial BMP and adjust therapy as needed. Renally dose meds. Avoid nephrotoxic medications and procedures.  Recent Labs     04/09/25  0837 04/10/25  0251 04/10/25  0809   CREATININE 1.6* 1.7* 1.6*   EGFRNORACEVR 32* 30* 32*     - correcting electrolytes as needed. Unclear if hypercalcemia was secondary to medications but repeating levels

## 2025-04-10 NOTE — TRANSFER OF CARE
"Anesthesia Transfer of Care Note    Patient: Misa Barajas    Procedure(s) Performed: Procedure(s) (LRB):  INSERTION, CARDIAC PACEMAKER, LEADLESS (N/A)  Removal, Pacemaker, Temporary Transvenous    Patient location: ICU    Anesthesia Type: general    Transport from OR: Transported from OR on 2-3 L/min O2 by NC with adequate spontaneous ventilation. Continuous ECG monitoring in transport. Continuous SpO2 monitoring in transport. Continuos invasive BP monitoring in transport. Transported from OR on 100% O2 by closed face mask with adequate spontaneous ventilation    Post pain: adequate analgesia    Post assessment: no apparent anesthetic complications    Post vital signs: stable    Level of consciousness: awake and sedated    Nausea/Vomiting: no nausea/vomiting    Complications: none    Transfer of care protocol was followed      Last vitals: Visit Vitals  BP (!) 157/69 (BP Location: Right arm, Patient Position: Lying)   Pulse 83   Temp 36.2 °C (97.2 °F) (Core Bladder)   Resp 18   Ht 5' 4" (1.626 m)   Wt 75.9 kg (167 lb 5.3 oz)   SpO2 97%   Breastfeeding No   BMI 28.72 kg/m²     "

## 2025-04-10 NOTE — ASSESSMENT & PLAN NOTE
Anemia is likely due to chronic blood loss. Most recent hemoglobin and hematocrit are listed below.  Recent Labs     04/08/25  0328 04/09/25  0221 04/10/25  0251   HGB 12.5 11.3* 11.6*   HCT 37.6 35.6* 36.6*     Plan  - Monitor serial CBC: Daily  - Transfuse PRBC if patient becomes hemodynamically unstable, symptomatic or H/H drops below 7/21.  - Patient has not received any PRBC transfusions to date  - Patient's anemia is currently stable  - holding iron supplementation while intubated

## 2025-04-10 NOTE — EICU
Virtual ICU Quality Rounds    Admit Date: 4/6/2025  Hospital Day: 4    ICU Day: 4d 10h    VICU Surveillance Screening    Daily review of  line necessity(optional): Urinary catheter in place    MARQUEZ on CKD   s/p PPM today    LDA reconciliation : Yes    Nursing orders placed : IP DENIA Peripheral IV Access    Orosco best practices : Nurse driven orosco removal protocol ordered on 4/6

## 2025-04-10 NOTE — PROGRESS NOTES
[facility] - Cardiac Intensive Care  Cardiac Electrophysiology  Progress Note    Admission Date: 4/6/2025  Code Status: Full Code   Attending Physician: Honorio Ortiz MD   Expected Discharge Date: 4/11/2025  Principal Problem:Complete heart block    Subjective:     Interval History: NAEON. No acute complaints. TVP threshold 0.8. Pending micra pacemaker today.     ROS 12 point ROS negative except for HPI  Objective:     Vital Signs (Most Recent):  Temp: 98.4 °F (36.9 °C) (04/10/25 0730)  Pulse: 64 (04/10/25 0730)  Resp: 20 (04/10/25 0730)  BP: 137/63 (04/10/25 0730)  SpO2: 95 % (04/10/25 0730) Vital Signs (24h Range):  Temp:  [98.1 °F (36.7 °C)-99.2 °F (37.3 °C)] 98.4 °F (36.9 °C)  Pulse:  [58-65] 64  Resp:  [15-38] 20  SpO2:  [95 %-99 %] 95 %  BP: (114-174)/(51-75) 137/63     Weight: 75.9 kg (167 lb 5.3 oz)  Body mass index is 28.72 kg/m².     SpO2: 95 %        Physical Exam  Constitutional:       General: She is not in acute distress.  Neck:      Comments: TVP and TLC  Cardiovascular:      Rate and Rhythm: Normal rate and regular rhythm.      Comments: Intermittently paced rhythm    Pulmonary:      Effort: Pulmonary effort is normal.      Breath sounds: Normal breath sounds.   Abdominal:      General: Bowel sounds are normal.      Palpations: Abdomen is soft.   Skin:     General: Skin is warm.   Neurological:      Mental Status: She is alert and oriented to person, place, and time.            Significant Labs: All pertinent lab results from the last 24 hours have been reviewed.    Significant Imaging: Echocardiogram: Transthoracic echo (TTE) complete (Cupid Only):   Results for orders placed or performed during the hospital encounter of 04/06/25   Echo   Result Value Ref Range    LV Diastolic Volume 69 mL    Echo EF Estimated 60 %    LV Systolic Volume 28 mL    IVS 0.8 0.6 - 1.1 cm    LVIDd 4.0 3.5 - 6.0 cm    LVIDs 2.7 2.1 - 4.0 cm    LVOT diameter 1.9 cm    PW 0.9 0.6 - 1.1 cm    AV LVOT peak gradient 3  mmHg    LVOT mn grad 2 mmHg    LVOT peak norberto 0.9 m/s    LVOT peak VTI 16.0 cm    RV- razo basal diam 2.5 cm    LA size 3.9 cm    Left Atrium Major Axis 4.3 cm    Left Atrium Minor Axis 4.0 cm    LA Vol (MOD) 32 mL    RA Major [place] 4.15 cm    AV valve area 1.8 cm2    AV area by cont VTI 1.8 cm2    AV peak gradient 7 mmHg    AV mean gradient 4 mmHg    Ao peak norberto 1.3 m/s    Ao VTI 24.8 cm    MV Peak A Norberto 1.19 m/s    E wave deceleration time 330 ms    MV Peak E Norberto 0.77 m/s    E/A ratio 0.65     LV LATERAL E/E' RATIO 25.7     LV SEPTAL E/E' RATIO 15.4     TDI LATERAL 0.03 m/s    TDI SEPTAL 0.05 m/s    MV peak gradient 5 mmHg    MV mean gradient 2 mmHg    TV peak gradient 22 mmHg    TR Max Norberto 2.3 m/s    Ascending aorta 2.61 cm    STJ 2.30 cm    Sinus 2.95 cm    LA WIDTH 2.4 cm    RA Width 3.25 cm    TAPSE 1.53 cm    BSA 1.85 m2    LVOT stroke volume 45.3 cm3    LV Systolic Volume Index 15.5 mL/m2    LV Diastolic Volume Index 38.12 mL/m2    LVOT area 2.8 cm2    FS 32.5 28 - 44 %    Left Ventricle Relative Wall Thickness 0.45 cm    LV mass 101.4 g    LV Mass Index 56.0 g/m2    E/E' ratio 19 m/s    TOMÁS 18 mL/m2    LA Vol 33 cm3    RV/LV Ratio 0.63 cm    TOMÁS (MOD) 18 mL/m2    AV Velocity Ratio 0.69     AV index (prosthetic) 0.65     KHOI by Velocity Ratio 2.0 cm²    Mean e' 0.04 m/s    ZLVIDS -1.08     ZLVIDD -2.25     Narrative      Left Ventricle: The left ventricle is normal in size. Normal wall   thickness. There is low normal systolic function with a visually estimated   ejection fraction of 50 - 55%. Grade II diastolic dysfunction.    Right Ventricle: The right ventricle is normal in size. Wall thickness   is normal. Systolic function is normal.    IVC/SVC: Patient is ventilated, cannot use IVC diameter to estimate   right atrial pressure.       Assessment and Plan:     * Complete heart block  Ms. Misa Barajas is a 80 yo female with COPD on 2L home O2, HFpEF, AFL s/p RFA (6/17/24 w/ Dr. Church), CKD III, HTN,  HLD, chronic venous insufficiency, orthostatic hypotension who was admitted to the hospital for complete heart block and shock. EP consulted for pacemaker evaluation.     Recommendation:   - Threshold 0.8 (0.6 yesterday)  - Pending micra pacemaker today.   - NPO  - Hold any AV melissa blocking agents.   - For full recommendations, please see staff attestation.         Yajaira Menjivar MD  Cardiac Electrophysiology  [facility] - Cardiac Intensive [facility]

## 2025-04-10 NOTE — PLAN OF CARE
CICU DAILY GOALS   CVP 4,4,6  SVO2 67  TVP in place, pacing rate 60  Multiple bowl movements       A: Awake    RASS: Goal -    Actual - RASS (Manjarrez Agitation-Sedation Scale): alert and calm   Restraint necessity: Clinical Justification: Removing medical devices, Treatment Interference  B: Breath   SBT: Not intubated   C: Coordinate A & B, analgesics/sedatives   Pain: managed    SAT: Not intubated  D: Delirium   CAM-ICU:    E: Early(intubated/ Progressive (non-intubated) Mobility   MOVE Screen: Pass   Activity: Activity Management: Rolling - L1  FAS: Feeding/Nutrition   Diet order: Diet/Nutrition Received: other (see comments) (Minced and moist),   Fluid restriction:     Nutritional Supplement Intake: Quantity 0, Type:   T: Thrombus   DVT prophylaxis: VTE Core Measure: Pharmacological prophylaxis initiated/maintained  H: HOB Elevation   Head of Bed (HOB) Positioning: HOB at 20-30 degrees  U: Ulcer Prophylaxis   GI: yes  G: Glucose control   managed    S: Skin   Bathing/Skin Care: bath, complete;dressed/undressed;electrode patches/site rotation (04/09/25 1441)  Wounds: No  Wound care consulted: No  B: Bowel Function   no issues   I: Indwelling Catheters   Muniz necessity:      Urethral Catheter 04/06/25 0645-Reason for Continuing Urinary Catheterization: Critically ill in ICU and requiring hourly monitoring of intake/output   CVC necessity: Yes  D: De-escalation Antibx   Yes  Plan for the day     Family/Goals of care/Code Status   Code Status: Full Code    Problem: Adult Inpatient Plan of Care  Goal: Plan of Care Review  Outcome: Progressing

## 2025-04-10 NOTE — PLAN OF CARE
MICU DAILY GOALS     Family/Goals of care/Code Status   Code Status: Full Code    24H Vital Sign Range  Temp:  [95.4 °F (35.2 °C)-99.2 °F (37.3 °C)]   Pulse:  []   Resp:  [15-38]   BP: (115-174)/(56-75)   SpO2:  [90 %-100 %]      Shift Events (include procedures and significant events)   No acute events throughout shift. Pt down for placement of leadless Pacemaker this afternoon; VDD Pacemaker placed. Hydralazine administered x1 for hypertension. D5W started for BG in the low 70s; blood sugar stable.     AWAKE RASS: Goal -    Actual - RASS (Manjarrez Agitation-Sedation Scale): alert and calm    Restraint necessity: Not necessary   BREATHE SBT: Not intubated    Coordinate A & B, analgesics/sedatives Pain: managed   SAT: Not intubated   Delirium CAM-ICU:     Early(intubated/ Progressive (non-intubated) Mobility MOVE Screen (INTUBATED ONLY): Not intubated    Activity: Activity Management: Rolling - L1   Feeding/Nutrition Diet order: Diet/Nutrition Received: other (see comments),     Thrombus DVT prophylaxis: VTE Core Measure: Pharmacological prophylaxis initiated/maintained   HOB Elevation Head of Bed (HOB) Positioning: HOB at 30-45 degrees   Ulcer Prophylaxis GI: no   Glucose control managed     Skin Skin assessment:     Sacrum intact/not altered? Yes  Heels intact/not altered? Yes  Surgical wound? Yes    CHECK ONE!   (no altered skin or altered skin) and sub boxes:  [] No Altered Skin Integrity Present    []Prevention Measures Documented    [x] Altered Skin Integrity Present or Discovered   [] LDA already present in EPIC, daily doc completed              [x] LDA added if not already in EPIC (describe/stage wound).               [] Wound Image Taken (required on admit,                   transfer/discharge and every Tuesday)    Wound Care Consulted? No   Bowel Function no issues    Indwelling Catheter Necessity      Urethral Catheter 04/06/25 0645-Reason for Continuing Urinary Catheterization: Required  immobilization    [REMOVED]  Introducer Single Lumen 04/06/25 0230 Internal Jugular Right-Line Necessity Review: Hemodynamic instability  [REMOVED] Percutaneous Central Line - Triple Lumen  04/06/25 0811 Internal Jugular Left-Line Necessity Review: Hemodynamic instability     De-escalation Antibiotics No        VS and assessment per flow sheet, patient progressing towards goals as tolerated, plan of care reviewed with  patient and daughter , all concerns addressed.

## 2025-04-10 NOTE — ANESTHESIA POSTPROCEDURE EVALUATION
Anesthesia Post Evaluation    Patient: Misa Barajas    Procedure(s) Performed: Procedure(s) (LRB):  INSERTION, CARDIAC PACEMAKER, LEADLESS (N/A)  Removal, Pacemaker, Temporary Transvenous    Final Anesthesia Type: general      Patient location during evaluation: PACU  Patient participation: Yes- Able to Participate  Level of consciousness: awake and alert  Post-procedure vital signs: reviewed and stable  Pain management: adequate  Airway patency: patent    PONV status at discharge: No PONV  Anesthetic complications: no      Cardiovascular status: stable  Respiratory status: spontaneous ventilation  Hydration status: euvolemic  Follow-up not needed.              Vitals Value Taken Time   /66 04/10/25 15:06   Temp 35.6 °C (96.08 °F) 04/10/25 15:08   Pulse 91 04/10/25 15:08   Resp 28 04/10/25 15:08   SpO2 100 % 04/10/25 15:08   Vitals shown include unfiled device data.      No case tracking events are documented in the log.      Pain/Neeraj Score: Pain Rating Prior to Med Admin: 5 (4/9/2025  5:44 PM)  Pain Rating Post Med Admin: 2 (4/9/2025  6:44 PM)

## 2025-04-10 NOTE — CARE UPDATE
CCU Care Update Note:    Hemodynamics@8pm:  Date CVP SVO2 CO/CI SVR PAP PCWP Susie    4/9 4 67 5.25/2.85 1355                Continuous Infusions:   0.9% NaCl   Intravenous Continuous   Stopped at 04/09/25 1117       Intake/Output Summary (Last 24 hours) at 4/9/2025 2304  Last data filed at 4/9/2025 2101  Gross per 24 hour   Intake 1719.52 ml   Output 730 ml   Net 989.52 ml           Plan:  No changes. Continue current management       Discussed with CCU Attending      Manuel Trevino MD, MPH  Cardiovascular Disease PGY IV  [facility]

## 2025-04-10 NOTE — SUBJECTIVE & OBJECTIVE
Interval History: NAEON; doing well, AF HDS on 2 L NC; labs stable. PVCs w/ intermittent pacing by TVP. On amox-clav (EED 04/13, 7 day course) for urosepsis. NPO for PPM today (tentative). D5W in s/o NPO. Plan to stepdown to  for placement tmr.    ROS  Objective:     Vital Signs (Most Recent):  Temp: 98.4 °F (36.9 °C) (04/10/25 0730)  Pulse: 64 (04/10/25 0730)  Resp: 20 (04/10/25 0730)  BP: (P) 137/63 (04/10/25 0730)  SpO2: 95 % (04/10/25 0730) Vital Signs (24h Range):  Temp:  [98.1 °F (36.7 °C)-99.2 °F (37.3 °C)] 98.4 °F (36.9 °C)  Pulse:  [58-69] 64  Resp:  [15-38] 20  SpO2:  [95 %-99 %] 95 %  BP: (105-174)/(51-75) (P) 137/63     Weight: 75.9 kg (167 lb 5.3 oz)  Body mass index is 28.72 kg/m².     SpO2: 95 %     Intake/Output Summary (Last 24 hours) at 4/10/2025 0748  Last data filed at 4/10/2025 0730  Gross per 24 hour   Intake 840.71 ml   Output 730 ml   Net 110.71 ml     Lines/Drains/Airways       Central Venous Catheter Line  Duration              Introducer Single Lumen 04/06/25 0230 Internal Jugular Right 4 days    Percutaneous Central Line - Triple Lumen  04/06/25 0811 Internal Jugular Left 3 days              Drain  Duration                  Urethral Catheter 04/06/25 0645 4 days              Line  Duration                  Pacer Wires 04/06/25 0256 4 days              Peripheral Intravenous Line  Duration                  Peripheral IV - Single Lumen 04/06/25 0216 18 G Posterior;Right Forearm 4 days                   Physical Exam  Vitals and nursing note reviewed.   Constitutional:       General: She is not in acute distress.     Appearance: She is ill-appearing.   HENT:      Head: Normocephalic and atraumatic.      Right Ear: External ear normal.      Left Ear: External ear normal.      Mouth/Throat:      Mouth: Mucous membranes are moist.      Pharynx: No oropharyngeal exudate.   Eyes:      General: No scleral icterus.     Extraocular Movements: Extraocular movements intact.      Pupils: Pupils are  equal, round, and reactive to light.   Cardiovascular:      Rate and Rhythm: Normal rate.      Pulses:           Carotid pulses are 2+ on the right side and 2+ on the left side.       Radial pulses are 2+ on the right side and 2+ on the left side.        Dorsalis pedis pulses are 1+ on the right side and 1+ on the left side.        Posterior tibial pulses are 1+ on the right side and 1+ on the left side.      Heart sounds: Normal heart sounds. No murmur heard.     Comments: Intermittently paced rhythm  Pulmonary:      Effort: Pulmonary effort is normal. No respiratory distress.      Breath sounds: Normal breath sounds. No wheezing or rales.   Abdominal:      General: Abdomen is flat. Bowel sounds are normal. There is distension.      Palpations: Abdomen is soft.      Tenderness: There is no abdominal tenderness.   Musculoskeletal:         General: No signs of injury. Normal range of motion.      Cervical back: Normal range of motion.      Right lower leg: Edema present.      Left lower leg: Edema present.   Skin:     General: Skin is warm and dry.      Coloration: Skin is pale.   Neurological:      General: No focal deficit present.      Mental Status: She is alert.      Sensory: No sensory deficit.      Motor: No weakness.   Psychiatric:         Mood and Affect: Mood normal.         Behavior: Behavior normal.         Thought Content: Thought content normal.      Significant Labs: All pertinent lab results from the last 24 hours have been reviewed.    Significant Imaging:  reviewed

## 2025-04-10 NOTE — ANESTHESIA PREPROCEDURE EVALUATION
04/10/2025  Misa Barajas is a 79 y.o., female.      Pre-op Assessment    I have reviewed the Patient Summary Reports.     I have reviewed the Nursing Notes.    I have reviewed the Medications.     Review of Systems  Anesthesia Hx:  No problems with previous Anesthesia             Denies Family Hx of Anesthesia complications.    Denies Personal Hx of Anesthesia complications.                    Procedure: INSERTION, CARDIAC PACEMAKER, LEADLESS (N/A)       Problem List[1]    Past Medical History:   Diagnosis Date    Acute biliary pancreatitis 07/27/2024    Anemia 07/12/2024    Aortic atherosclerosis     Arthritis     knee joint pain    Asthma-COPD overlap syndrome 08/22/2022    home o2    Breast cancer 2002    left breast & lymph nodes-s/p sx with chemo    Cataracts, bilateral     Chronic diastolic heart failure 12/24/2019    Chronic kidney disease, stage 3     Class 1 obesity due to excess calories with serious comorbidity and body mass index (BMI) of 30.0 to 30.9 in adult 05/16/2024    History of chemotherapy     last treatment 12/2002 (had 8 treatments)    Hyperlipidemia 11/20/2016    Hypertension     Lymphedema of both lower extremities 01/25/2022    Nephrolithiasis 06/02/2014    Osteoporosis     Paroxysmal atrial fibrillation 06/07/2021    Renal osteodystrophy 01/14/2016    Shock 4/6/2025    Subclinical hypothyroidism 4/6/2025    Venous stasis dermatitis of both lower extremities 01/25/2022    Vitamin D insufficiency        ECHO: Results for orders placed during the hospital encounter of 04/06/25    Echo    Interpretation Summary    Left Ventricle: The left ventricle is normal in size. Normal wall thickness. There is low normal systolic function with a visually estimated ejection fraction of 50 - 55%. Grade II diastolic dysfunction.    Right Ventricle: The right ventricle is normal in size. Wall  Initial Clinical Review    Admission: Date/Time/Statement:   Admission Orders (From admission, onward)     Ordered        07/27/22 0502  INPATIENT ADMISSION  Once                      Orders Placed This Encounter   Procedures    INPATIENT ADMISSION     Standing Status:   Standing     Number of Occurrences:   1     Order Specific Question:   Level of Care     Answer:   Med Surg [16]     Order Specific Question:   Estimated length of stay     Answer:   More than 2 Midnights     Order Specific Question:   Certification     Answer:   I certify that inpatient services are medically necessary for this patient for a duration of greater than two midnights  See H&P and MD Progress Notes for additional information about the patient's course of treatment  ED Arrival Information     Expected   -    Arrival   7/27/2022 02:22    Acuity   Emergent            Means of arrival   Ambulance    Escorted by   [facility]    Admission type   Emergency            Arrival complaint   AFIB           Chief Complaint   Patient presents with    Cardiac Device Problem     Pt with AICD discharge at 145am while using restroom, pt with hx of AFIB, reports that she has had this happen once prior, unsure of report from last visit  Denies CP, reports some SOB  Initial Presentation:   77 yof to ER from home via EMS c/o feeling lightheaded walking to BR, felt AICD discharge  Also reports GARCÍA & swelling of legs over past week  Hx HTN, a fib, CHF, cardiomyopathy with AICD  Presents with rales  Admitted to inpatient status for NSVT  Cardio consulted & started on Sotalol & IV diuresis  Per cardio:  1  Ventricular tachycardia  2  Acute on chronic systolic heart failure  3  Chronic kidney disease  4  Dilated cardiomyopathy:  Patient has ICD  5  Paroxysmal atrial fibrillation  6  Hypertension  Plan:  1  Will increase her diuretic of Lasix to 60 mg twice a day    Close monitoring of her I&O, daily weights and electrolytes  2  Discussed with patient action plan for discharge, may increase her Demadex to 40 mg in the a m  And 20 mg in the p m  With close monitoring of weights and labs  Patient may also be instructed to take an extra 20 mg tablet if she notes any weight gain  3  Continue Xarelto and sotalol for atrial fibrillation  4  Continue Entresto 97/103 mg b i d  In addition to her Aldactone 12 5 daily  Date: 7/28/22   Day 2:   NSVT, mgt per cardio  Pt with 9 5 second run of VT @ rate of 272  Continue IV lasix for volume overload  Lost 5 of 6# gained since admission  Continue sotalol & lopressor bid  Plan to restart demadex & Aldactone tomorrow  Lungs with decreased breath sounds     Intake/Output Summary (Last 24 hours) at 7/28/2022 1504  Last data filed at 7/28/2022 1039  Gross per 24 hour   Intake --   Output 800 ml   Net -800 ml     ED Triage Vitals   Temperature Pulse Respirations Blood Pressure SpO2   07/27/22 0231 07/27/22 0226 07/27/22 0226 07/27/22 0231 07/27/22 0226   98 6 °F (37 °C) 90 20 124/83 96 %      Temp Source Heart Rate Source Patient Position - Orthostatic VS BP Location FiO2 (%)   07/27/22 0231 07/27/22 0226 07/27/22 0231 07/27/22 0231 --   Oral Monitor Lying Right arm       Pain Score       07/27/22 1003       No Pain          Wt Readings from Last 1 Encounters:   07/28/22 108 kg (238 lb 1 6 oz)     Additional Vital Signs:   Date/Time Temp Pulse Resp BP MAP (mmHg) SpO2 O2 Device Patient Position - Orthostatic VS   07/28/22 07:12:59 97 6 °F (36 4 °C) 72 18 102/69 80 97 % -- --   07/28/22 02:44:18 97 8 °F (36 6 °C) 69 17 98/55 69 99 % -- --   07/27/22 23:24:05 97 9 °F (36 6 °C) 78 20 107/70 82 98 % -- --   07/27/22 21:29:43 97 °F (36 1 °C) Abnormal  72 18 102/69 80 97 % -- Lying   07/27/22 19:52:36 97 5 °F (36 4 °C) 77 18 93/57 69 95 % None (Room air) Lying   07/27/22 17:20:34 -- 71 -- 93/56 68 98 % -- --   07/27/22 15:30:06 98 4 °F (36 9 °C) 79 18 109/73 85 97 % -- --   07/27/22 0956 -- 66 18 thickness is normal. Systolic function is normal.    IVC/SVC: Patient is ventilated, cannot use IVC diameter to estimate right atrial pressure.      Body mass index is 28.72 kg/m².    Tobacco Use: Medium Risk (4/6/2025)    Patient History     Smoking Tobacco Use: Former     Smokeless Tobacco Use: Former     Passive Exposure: Not on file       Social History     Substance and Sexual Activity   Drug Use No        Alcohol Use: Not At Risk (2/17/2025)    AUDIT-C     Frequency of Alcohol Consumption: Never     Average Number of Drinks: Patient does not drink     Frequency of Binge Drinking: Never       Review of patient's allergies indicates:   Allergen Reactions    Adhesive tape-silicones     Adhesive Rash     Pt states she removed a LATEX bandaid and the skin beneath was swollen and red. No other latex causes a reaction.         Airway:  No value filed.     Physical Exam  General: Well nourished, Cooperative, Alert and Oriented    Airway:  Mallampati: II   Mouth Opening: Normal  TM Distance: Normal  Tongue: Normal  Neck ROM: Normal ROM    Dental:  Intact, Caps / Implants        Anesthesia Plan  Type of Anesthesia, risks & benefits discussed:    Anesthesia Type: Gen Natural Airway  Intra-op Monitoring Plan: Standard ASA Monitors  Post Op Pain Control Plan: multimodal analgesia  Induction:  IV  Informed Consent: Informed consent signed with the Patient and all parties understand the risks and agree with anesthesia plan.  All questions answered.   ASA Score: 4  Day of Surgery Review of History & Physical: H&P Update referred to the surgeon/provider.    Ready For Surgery From Anesthesia Perspective.     .           [1]   Patient Active Problem List  Diagnosis    Nephrolithiasis    HTN (hypertension)    Secondary hyperparathyroidism of renal origin    Drug-induced polyneuropathy    Osteoarthritis of knee    Renal osteodystrophy    Aortic atherosclerosis    ACP (advance care planning)    RBBB    Arthritis of facet joints at  -- -- 99 % None (Room air) --   07/27/22 09:38:12 -- 71 18 -- -- 98 % -- --   07/27/22 0639 -- 70 18 106/66 80 92 % None (Room air) Lying   07/27/22 0438 -- 64 18 105/62 75 95 % None (Room air) Lying   07/27/22 0400 -- 67 20 107/63 80 93 % None (Room air) Lying   07/27/22 0315 -- 72 20 102/67 80 93 % None (Room air) Lying   07/27/22 0231 98 6 °F (37 °C) -- -- 124/83 -- -- -- Lying     Pertinent Labs/Diagnostic Test Results:   XR chest 1 view portable   Final Result (07/27 0901)   Persistent cardiomegaly      No acute cardiopulmonary disease          7/27  Ekg=  Atrial-sensed ventricular-paced rhythm  Biventricular pacemaker detected    Results from last 7 days   Lab Units 07/27/22  0738   SARS-COV-2  Negative     Results from last 7 days   Lab Units 07/28/22  0611 07/27/22  0255   WBC Thousand/uL 8 20 11 91*   HEMOGLOBIN g/dL 12 3 12 3   HEMATOCRIT % 39 1 39 8   PLATELETS Thousands/uL 311 311   NEUTROS ABS Thousands/µL  --  8 70*     Results from last 7 days   Lab Units 07/28/22  0611 07/27/22  0255   SODIUM mmol/L 145 143   POTASSIUM mmol/L 4 3 3 6   CHLORIDE mmol/L 107 106   CO2 mmol/L 30 29   ANION GAP mmol/L 8 8   BUN mg/dL 19 26*   CREATININE mg/dL 1 18 1 40*   EGFR ml/min/1 73sq m 47 38   CALCIUM mg/dL 9 3 9 2   MAGNESIUM mg/dL  --  2 3     Results from last 7 days   Lab Units 07/28/22  0611 07/27/22  0255   GLUCOSE RANDOM mg/dL 109 113     Results from last 7 days   Lab Units 07/27/22  0712 07/27/22  0511 07/27/22  0255   HS TNI 0HR ng/L  --   --  10   HS TNI 2HR ng/L  --  18  --    HSTNI D2 ng/L  --  8  --    HS TNI 4HR ng/L 20  --   --    HSTNI D4 ng/L 10  --   --      ED Treatment:   Medication Administration from 07/27/2022 0221 to 07/27/2022 5647       Date/Time Order Dose Route Action     07/27/2022 0256 midazolam (VERSED) injection 1 mg 1 mg Intravenous Given     07/27/2022 0438 ipratropium (ATROVENT) 0 02 % inhalation solution 0 5 mg 0 5 mg Nebulization Given     07/27/2022 0511 levalbuterol (Rollo Crystal) multiple vertebral levels    Breast calcification, right    CKD (chronic kidney disease) stage 3, GFR 30-59 ml/min    Calcified granuloma of lung    Overweight with body mass index (BMI) of 27 to 27.9 in adult    Varicose veins of both legs with edema    Acute on chronic diastolic heart failure    Hyperlipidemia    Centrilobular emphysema    Pleural effusion    Chronic respiratory failure with hypoxia    Atelectasis    Chronic diastolic heart failure    Lymphedema of both lower extremities    Venous insufficiency of both lower extremities    Venous stasis dermatitis of both lower extremities    Right ureteral stone    MARQUEZ (acute kidney injury)    Right sided hydroureteronephrosis    Chronic anticoagulation    Epigastric abdominal pain    Osteoporosis    COPD (chronic obstructive pulmonary disease)    Senile purpura    Hypokalemia    Hypomagnesemia    Unspecified inflammatory spondylopathy, multiple sites in spine    Diastolic CHF, chronic    Elevated troponin    Acute cystitis    Urinary retention    Hypophosphatemia    Hypermagnesemia    Acute interstitial nephritis    Hypotension    Hypercoagulability due to atrial fibrillation    Acute on chronic congestive heart failure    Former heavy tobacco smoker    Cellulitis    Gram-negative bacteremia - Pasturella multocida    Acute blood loss anemia    Leucocytosis    Acute pancreatitis    Edema    Leukocytosis    Left arm swelling    Yeast infection    Orthostatic hypotension    Typical atrial flutter    Iron deficiency anemia due to chronic blood loss    Hypoalbuminemia    Stage 3b chronic kidney disease    PAF (paroxysmal atrial fibrillation)    Pulmonary hypertension    Shock    Subclinical hypothyroidism    Complete heart block      inhalation solution 1 25 mg 1 25 mg Nebulization Given     07/27/2022 0635 potassium chloride (K-DUR,KLOR-CON) CR tablet 40 mEq 40 mEq Oral Given        Past Medical History:   Diagnosis Date    A-fib ([place] [address] )     Abnormal blood sugar     Acid reflux     Acute bronchitis     Allergic reaction     Anxiety     Arthritis     Asthma     Cardiomyopathy ([place] [address] )     Cellulitis of left lower leg     Chest pain     CHF (congestive heart failure) ([facility])     Constipation     Depression with anxiety     Disease of thyroid gland     Diverticulitis     Dyslipidemia     Dyspnea on exertion     Elbow pain     Fracture of olecranon, open     Gastritis     Generalized pain     Hematuria     History of colonoscopy     Hypertension     Hypokalemia     Hypomagnesemia     Leg cramping     Moderate obesity     Morbid obesity due to excess calories ([facility])     Myalgia     Myositis     Nausea in adult     Nephrolithiasis     Prepatellar bursitis, unspecified knee     Screening for lipid disorders     Shortness of breath     Spasm of muscle     Thyroid trouble      Present on Admission:   NSVT (nonsustained ventricular tachycardia) ([facility])   Acute on chronic congestive heart failure ([facility])   CKD (chronic kidney disease)   Dilated cardiomyopathy ([facility])   Hypertension   Hypothyroidism   Mild intermittent asthma without complication   Paroxysmal atrial fibrillation ([facility])   Dyslipidemia    Admitting Diagnosis: V-tach ([place] [address] ) [I47 2]  Mild intermittent asthma without complication [L51 48]  AICD discharge [W07 51]  Complication of cardiac device [T82  9XXA]  Postural dizziness with presyncope [R42, R55]  Age/Sex: 77 y o  female  Admission Orders:  1800cc fluid restriction  Telemetry  Arrange ICD/pacemaker interrogation  Consult cardio    Scheduled Medications:  budesonide-formoterol, 2 puff, Inhalation, BID  furosemide, 40 mg, Intravenous, BID>increased to 60mg BID  levothyroxine, 150 mcg, Oral, Daily  metoprolol tartrate, 12 5 mg, Oral, Q12H ALEKSANDRA  pantoprazole, 40 mg, Oral, Early Morning  potassium chloride, 10 mEq, Oral, Daily  pravastatin, 40 mg, Oral, Daily With Dinner  rivaroxaban, 20 mg, Oral, Daily  sacubitril-valsartan, 1 tablet, Oral, BID  sotalol, 80 mg, Oral, Q12H    PRN Meds:  acetaminophen, 650 mg, Oral, Q6H PRN  ALPRAZolam, 0 5 mg, Oral, BID PRN  levalbuterol, 0 63 mg, Nebulization, Q6H PRN  ondansetron, 4 mg, Intravenous, Q6H PRN  tiZANidine, 2 mg, Oral, Q8H PRN    Network Utilization Review Department  ATTENTION: Please call with any questions or concerns to [phone] and carefully listen to the prompts so that you are directed to the right person  All voicemails are confidential   Olga Lidia Diop all requests for admission clinical reviews, approved or denied determinations and any other requests to dedicated fax number below belonging to the campus where the patient is receiving treatment   List of dedicated fax numbers for the Facilities:  [address] DENIALS (Administrative/Medical Necessity) [phone]   [address] (Maternity/NICU/Pediatrics) [phone]   [address]  [address] [address] [place] [address] [address] [facility] [place] [address] [phone]

## 2025-04-10 NOTE — PT/OT/SLP PROGRESS
Speech Language Pathology      Misa ROXANA Barajas  MRN: [id number]    Patient not seen today secondary to pt NPO for possible pacemaker placement. SLP will follow up.  4/10/2025

## 2025-04-10 NOTE — ASSESSMENT & PLAN NOTE
Misa Barajas is a 79 y.o. female ,is admitted on 4/6/2025  with past medical history of   COPD on 2L home O2, HFpEF, AFL s/p RFA (6/17/24 w/ Dr. Church), CKD III, HTN, HLD, chronic venous insufficiency, orthostatic hypotension.     Admitted with bradycardia and hypotension, requiring pacing and pressors  Nephrology  is consulted for MARQUEZ on CKD     Impression:  MARQUEZ on CKD 3    Baseline Creatinine: 1.2-1.4  Creatinine at time of consult: 2.1, stable at 1.8 for several days  Tests done: UA neg  MARQUEZ 2/2 iATN from bradycardia/hemodynamic changes.        Recommendations :   -No urgent indication for RRT. 24 UOP 715mL. Minimal PO intake.  Electrolytes stable.   -no need for lasix at this time, continue trending I/O's; CVP.   -would recommend 500 ml of LR over 5 hours  -Monitor serum chemistries daily,  -Avoid nephrotoxic medications (NSAID, IV contrast)  -Avoid ACEi and ARBs in the setting of MARQUEZ  -Maintain MAP > 65  -Transfuse for Hb <7

## 2025-04-10 NOTE — PROGRESS NOTES
[facility]  Nephrology  Progress Note    Patient Name: Misa Barajas  MRN: [id number]  Admission Date: 4/6/2025  Hospital Length of Stay: 4 days  Attending Provider: Honorio Ortiz MD   Primary Care Physician: Isela Langston MD  Principal Problem:Complete heart block    Subjective:     Interval History:   NAEON.   ml/24h keeping up with intake.  CVP 4.  Kidney function remains stable.  Poor PO intake.    Review of patient's allergies indicates:   Allergen Reactions    Adhesive tape-silicones     Adhesive Rash     Pt states she removed a LATEX bandaid and the skin beneath was swollen and red. No other latex causes a reaction.     Current Facility-Administered Medications   Medication Frequency    0.9% NaCl infusion Continuous    acetaminophen tablet 650 mg Q6H PRN    amoxicillin-pot clavulanate 250-62.5 mg/5ml suspension 500 mg Q12H    D5W infusion Continuous    dextrose 50% injection 12.5 g PRN    dextrose 50% injection 25 g PRN    glucagon (human recombinant) injection 1 mg PRN    glucose chewable tablet 16 g PRN    glucose chewable tablet 24 g PRN    heparin (porcine) 2,000 Units in 0.9% NaCl 1,000 mL PRN    heparin infusion 1,000 units/500 ml in 0.9% NaCl (on sterile field) PRN    levothyroxine tablet 50 mcg Before breakfast    LIDOcaine 5 % patch 1 patch Q24H    miconazole nitrate 2% ointment BID    mupirocin 2 % ointment BID    polyethylene glycol packet 17 g BID    senna tablet 8.6 mg Daily    sodium chloride 0.9% flush 10 mL PRN       Objective:     Vital Signs (Most Recent):  Temp: 97 °F (36.1 °C) (04/10/25 1115)  Pulse: 89 (04/10/25 1149)  Resp: 18 (04/10/25 1115)  BP: (!) 151/68 (04/10/25 1115)  SpO2: (!) 94 % (04/10/25 1115) Vital Signs (24h Range):  Temp:  [97 °F (36.1 °C)-99.2 °F (37.3 °C)] 97 °F (36.1 °C)  Pulse:  [] 89  Resp:  [15-38] 18  SpO2:  [90 %-99 %] 94 %  BP: (114-174)/(51-75) 151/68     Weight: 75.9 kg (167 lb 5.3 oz) (04/10/25 0400)  Body mass index is  28.72 kg/m².  Body surface area is 1.85 meters squared.    I/O last 3 completed shifts:  In: 2175.8 [P.O.:50; I.V.:172; IV Piggyback:1953.8]  Out: 1160 [Urine:1160]     Physical Exam  Vitals and nursing note reviewed.   Constitutional:       Appearance: She is ill-appearing.   HENT:      Head: Atraumatic.   Eyes:      General: No scleral icterus.        Right eye: No discharge.         Left eye: No discharge.      Extraocular Movements: Extraocular movements intact.      Conjunctiva/sclera: Conjunctivae normal.   Cardiovascular:      Rate and Rhythm: Normal rate.      Comments: TVP in place  Pulmonary:      Effort: No tachypnea, accessory muscle usage or respiratory distress.      Breath sounds: Normal breath sounds. No wheezing or rales.   Musculoskeletal:      Right lower leg: Edema present.      Left lower leg: Edema present.   Skin:     General: Skin is warm.   Neurological:      Mental Status: She is alert.          Significant Labs:  CBC:   Recent Labs   Lab 04/10/25  0251   WBC 9.60   RBC 4.02   HGB 11.6*   HCT 36.6*   *   MCV 91   MCH 28.9   MCHC 31.7*     CMP:   Recent Labs   Lab 04/10/25  0251 04/10/25  0809   CALCIUM 9.3 9.6   ALBUMIN 1.9*  --     142   K 3.8 3.9   CO2 19* 18*   * 116*   BUN 36* 37*   CREATININE 1.7* 1.6*   ALKPHOS 80  --    ALT 14  --    AST 25  --    BILITOT 0.7  --         Assessment/Plan:     Renal/  MARQUEZ (acute kidney injury)  Misa Barajas is a 79 y.o. female ,is admitted on 4/6/2025  with past medical history of   COPD on 2L home O2, HFpEF, AFL s/p RFA (6/17/24 w/ Dr. Church), CKD III, HTN, HLD, chronic venous insufficiency, orthostatic hypotension.     Admitted with bradycardia and hypotension, requiring pacing and pressors  Nephrology  is consulted for MARQUEZ on CKD     Impression:  MARQUEZ on CKD 3    Baseline Creatinine: 1.2-1.4  Creatinine at time of consult: 2.1, stable at 1.8 for several days  Tests done: UA neg  MARQUEZ 2/2 iATN from bradycardia/hemodynamic  changes.        Recommendations :   -No urgent indication for RRT. 24 UOP 715mL. Minimal PO intake.  Electrolytes stable.   -no need for lasix at this time, continue trending I/O's; CVP.   -would recommend 500 ml of LR over 5 hours  -Monitor serum chemistries daily,  -Avoid nephrotoxic medications (NSAID, IV contrast)  -Avoid ACEi and ARBs in the setting of MARQUEZ  -Maintain MAP > 65  -Transfuse for Hb <7      Ethan Roque NP  Nephrology  [facility]

## 2025-04-10 NOTE — PROGRESS NOTES
[facility]  Cardiology  Progress Note    Patient Name: Misa Barajas  MRN: [id number]  Admission Date: 4/6/2025  Hospital Length of Stay: 4 days  Code Status: Full Code  Attending Physician: Honorio Ortiz MD  Primary Care Provider: Isela Langston MD  Expected Discharge Date: 4/11/2025  Principal Problem: Complete heart block    Subjective   Hospital Course:  Admitted to CCU for CHB in s/o hyperkalemia & in the presence of conduction disease/RBBB & LAHB - stable w/ temporary wire. Septic shock d/t UTI; vasopressors weaned off. HTN; started NG gtt. Acute on chronic hypoxic/hypercapnic RF & obtunded; intubated. Successfully extubated 04/08. MARQUEZ on CKD; improved w/ good UOP response to diuresis. PVCs w/ intermittent pacing by TVP. Switched pip-tazo to amox-clav on 04/09; continued to improve; empiric EED 04/13 for 7 day course. PPM by EP pending (tentative 04/10).    Objective    Interval History: NAEON; doing well, AF HDS on 2 L NC; labs stable. PVCs w/ intermittent pacing by TVP. On amox-clav (EED 04/13, 7 day course) for urosepsis. NPO for PPM today (tentative). D5W in s/o NPO. Plan to stepdown to  for placement tmr.    ROS  Objective:     Vital Signs (Most Recent):  Temp: 98.4 °F (36.9 °C) (04/10/25 0730)  Pulse: 64 (04/10/25 0730)  Resp: 20 (04/10/25 0730)  BP: (P) 137/63 (04/10/25 0730)  SpO2: 95 % (04/10/25 0730) Vital Signs (24h Range):  Temp:  [98.1 °F (36.7 °C)-99.2 °F (37.3 °C)] 98.4 °F (36.9 °C)  Pulse:  [58-69] 64  Resp:  [15-38] 20  SpO2:  [95 %-99 %] 95 %  BP: (105-174)/(51-75) (P) 137/63     Weight: 75.9 kg (167 lb 5.3 oz)  Body mass index is 28.72 kg/m².     SpO2: 95 %     Intake/Output Summary (Last 24 hours) at 4/10/2025 0748  Last data filed at 4/10/2025 0730  Gross per 24 hour   Intake 840.71 ml   Output 730 ml   Net 110.71 ml     Lines/Drains/Airways       Central Venous Catheter Line  Duration              Introducer Single Lumen 04/06/25 0230 Internal Jugular  Right 4 days    Percutaneous Central Line - Triple Lumen  04/06/25 0811 Internal Jugular Left 3 days              Drain  Duration                  Urethral Catheter 04/06/25 0645 4 days              Line  Duration                  Pacer Wires 04/06/25 0256 4 days              Peripheral Intravenous Line  Duration                  Peripheral IV - Single Lumen 04/06/25 0216 18 G Posterior;Right Forearm 4 days                   Physical Exam  Vitals and nursing note reviewed.   Constitutional:       General: She is not in acute distress.     Appearance: She is ill-appearing.   HENT:      Head: Normocephalic and atraumatic.      Right Ear: External ear normal.      Left Ear: External ear normal.      Mouth/Throat:      Mouth: Mucous membranes are moist.      Pharynx: No oropharyngeal exudate.   Eyes:      General: No scleral icterus.     Extraocular Movements: Extraocular movements intact.      Pupils: Pupils are equal, round, and reactive to light.   Cardiovascular:      Rate and Rhythm: Normal rate.      Pulses:           Carotid pulses are 2+ on the right side and 2+ on the left side.       Radial pulses are 2+ on the right side and 2+ on the left side.        Dorsalis pedis pulses are 1+ on the right side and 1+ on the left side.        Posterior tibial pulses are 1+ on the right side and 1+ on the left side.      Heart sounds: Normal heart sounds. No murmur heard.     Comments: Intermittently paced rhythm  Pulmonary:      Effort: Pulmonary effort is normal. No respiratory distress.      Breath sounds: Normal breath sounds. No wheezing or rales.   Abdominal:      General: Abdomen is flat. Bowel sounds are normal. There is distension.      Palpations: Abdomen is soft.      Tenderness: There is no abdominal tenderness.   Musculoskeletal:         General: No signs of injury. Normal range of motion.      Cervical back: Normal range of motion.      Right lower leg: Edema present.      Left lower leg: Edema present.    Skin:     General: Skin is warm and dry.      Coloration: Skin is pale.   Neurological:      General: No focal deficit present.      Mental Status: She is alert.      Sensory: No sensory deficit.      Motor: No weakness.   Psychiatric:         Mood and Affect: Mood normal.         Behavior: Behavior normal.         Thought Content: Thought content normal.      Significant Labs: All pertinent lab results from the last 24 hours have been reviewed.    Significant Imaging:  reviewed  Assessment & Plan  Complete heart block  Shock  Ms. Misa Barajas is a 80 yo female with COPD on 2L home O2, HFpEF, AFL s/p RFA (6/17/24 w/ Dr. Church), CKD III, HTN, HLD, chronic venous insufficiency, orthostatic hypotension who was brought via EMS after per history she was found confused and bradycardiac with HR in the 20s.They started epi and transcutaneous pacing. They had to bag her in route. We do not have strip of underlying rhythm. Patient obtunded for which she was intubated on arrival to the ED and transcutaneous pacing was exchanged to transvenous pacing via RIJ. Underlying rate 44 bifascicular block. Known RBBB.    -Unclear etiology ddx include sepsis, mesenteric ischemia?  - elevated lactic acid. Awaiting CT h/c/a/p  - bradycardia most likely in the setting of electrolyte disturbance and abnormal thyroid function  - correcting electrolyte disturbance. Suspect most likely she will then not require tvp.   - obtaining bcx and ucx. Broad spectrum abx ppx and deescalating as needed.   No cx growth, empiric tx for urosepsis w/ pip-tazo (start 04/06)  Complete amox-clav (EED 04/13) for 7 day course  - orosco placement  EP following  PPM tentatively planned for 04/10 (pending pt/family discussion)  RBBB  Chronic   CKD (chronic kidney disease) stage 3, GFR 30-59 ml/min  Now MARQUEZ on CKD3. Nephrology consulted. Patient on bumex and potassium supplement as home which have been held  Centrilobular emphysema  Prior history of smoking.  "Continue scheduled inhalers once extubated. Antibiotics and Supplemental oxygen and monitor respiratory status closely.   Chronic respiratory failure with hypoxia  Patient with Hypoxic Respiratory failure which is Chronic.  she is on home oxygen at 2 LPM. Supplemental oxygen was provided and noted- Oxygen Concentration (%):  [28] 28     Signs/symptoms of respiratory failure include- tachypnea, increased work of breathing, use of accessory muscles, and lethargy. Contributing diagnoses includes - underlying COPD   MARQUEZ (acute kidney injury)  .BMP reviewed- noted Estimated Creatinine Clearance: 19.6 mL/min (A) (based on SCr of 2.1 mg/dL (H)). according to latest data. Based on current GFR, CKD stage is stage 4 - GFR 15-29.  Monitor UOP and serial BMP and adjust therapy as needed. Renally dose meds. Avoid nephrotoxic medications and procedures.  Recent Labs     04/09/25  0837 04/10/25  0251 04/10/25  0809   CREATININE 1.6* 1.7* 1.6*   EGFRNORACEVR 32* 30* 32*     - correcting electrolytes as needed. Unclear if hypercalcemia was secondary to medications but repeating levels  Acute on chronic congestive heart failure   Most recent BNP and echo results are listed below.  No results for input(s): "BNP" in the last 72 hours.    Latest ECHO  Results for orders placed during the hospital encounter of 01/29/25    Echo    Interpretation Summary    Left Ventricle: The left ventricle is normal in size. Normal wall thickness. Normal wall motion. There is normal systolic function. Ejection fraction is approximately 55%.    Right Ventricle: Normal right ventricular cavity size. Wall thickness is normal. Systolic function is borderline low.    Left Atrium: Left atrium is severely dilated.    Mitral Valve: There is moderate posterior mitral annular calcification present.    Tricuspid Valve: There is mild regurgitation.    Pulmonary Artery: The estimated pulmonary artery systolic pressure is 51 mmHg.    IVC/SVC: Elevated venous pressure " at 15 mmHg.    Current Heart Failure Medications: have been held due to bradycardia and MARQUEZ       Plan  - Monitor strict I&Os and daily weights.    - Place on telemetry  - repeating official echo, bedside echo appears to have normal LVEF, IVC cannot be assessed as patient intubated  Orthostatic hypotension  holding midodrine and per history patient had not required it since last hospitalization  Iron deficiency anemia due to chronic blood loss  Anemia is likely due to chronic blood loss. Most recent hemoglobin and hematocrit are listed below.  Recent Labs     04/08/25  0328 04/09/25  0221 04/10/25  0251   HGB 12.5 11.3* 11.6*   HCT 37.6 35.6* 36.6*     Plan  - Monitor serial CBC: Daily  - Transfuse PRBC if patient becomes hemodynamically unstable, symptomatic or H/H drops below 7/21.  - Patient has not received any PRBC transfusions to date  - Patient's anemia is currently stable  - holding iron supplementation while intubated  Subclinical hypothyroidism  TSH 10.722 with normal FT4.  Endo consulted  Levothyroxine     VTE Risk Mitigation (From admission, onward)           Ordered     IP VTE HIGH RISK PATIENT  Once         04/06/25 0512     Place sequential compression device  Until discontinued         04/06/25 0454                  Talha Salcido MD  Cardiology  [facility]

## 2025-04-10 NOTE — SUBJECTIVE & OBJECTIVE
Interval History: NAEON. No acute complaints. TVP threshold 0.8. Pending micra pacemaker today.     ROS 12 point ROS negative except for HPI  Objective:     Vital Signs (Most Recent):  Temp: 98.4 °F (36.9 °C) (04/10/25 0730)  Pulse: 64 (04/10/25 0730)  Resp: 20 (04/10/25 0730)  BP: 137/63 (04/10/25 0730)  SpO2: 95 % (04/10/25 0730) Vital Signs (24h Range):  Temp:  [98.1 °F (36.7 °C)-99.2 °F (37.3 °C)] 98.4 °F (36.9 °C)  Pulse:  [58-65] 64  Resp:  [15-38] 20  SpO2:  [95 %-99 %] 95 %  BP: (114-174)/(51-75) 137/63     Weight: 75.9 kg (167 lb 5.3 oz)  Body mass index is 28.72 kg/m².     SpO2: 95 %        Physical Exam  Constitutional:       General: She is not in acute distress.  Neck:      Comments: TVP and TLC  Cardiovascular:      Rate and Rhythm: Normal rate and regular rhythm.      Comments: Intermittently paced rhythm    Pulmonary:      Effort: Pulmonary effort is normal.      Breath sounds: Normal breath sounds.   Abdominal:      General: Bowel sounds are normal.      Palpations: Abdomen is soft.   Skin:     General: Skin is warm.   Neurological:      Mental Status: She is alert and oriented to person, place, and time.            Significant Labs: All pertinent lab results from the last 24 hours have been reviewed.    Significant Imaging: Echocardiogram: Transthoracic echo (TTE) complete (Cupid Only):   Results for orders placed or performed during the hospital encounter of 04/06/25   Echo   Result Value Ref Range    LV Diastolic Volume 69 mL    Echo EF Estimated 60 %    LV Systolic Volume 28 mL    IVS 0.8 0.6 - 1.1 cm    LVIDd 4.0 3.5 - 6.0 cm    LVIDs 2.7 2.1 - 4.0 cm    LVOT diameter 1.9 cm    PW 0.9 0.6 - 1.1 cm    AV LVOT peak gradient 3 mmHg    LVOT mn grad 2 mmHg    LVOT peak apurva 0.9 m/s    LVOT peak VTI 16.0 cm    RV- razo basal diam 2.5 cm    LA size 3.9 cm    Left Atrium Major Axis 4.3 cm    Left Atrium Minor Axis 4.0 cm    LA Vol (MOD) 32 mL    RA Major [place] 4.15 cm    AV valve area 1.8 cm2    AV area  by cont VTI 1.8 cm2    AV peak gradient 7 mmHg    AV mean gradient 4 mmHg    Ao peak norberto 1.3 m/s    Ao VTI 24.8 cm    MV Peak A Norberto 1.19 m/s    E wave deceleration time 330 ms    MV Peak E Norberto 0.77 m/s    E/A ratio 0.65     LV LATERAL E/E' RATIO 25.7     LV SEPTAL E/E' RATIO 15.4     TDI LATERAL 0.03 m/s    TDI SEPTAL 0.05 m/s    MV peak gradient 5 mmHg    MV mean gradient 2 mmHg    TV peak gradient 22 mmHg    TR Max Norberto 2.3 m/s    Ascending aorta 2.61 cm    STJ 2.30 cm    Sinus 2.95 cm    LA WIDTH 2.4 cm    RA Width 3.25 cm    TAPSE 1.53 cm    BSA 1.85 m2    LVOT stroke volume 45.3 cm3    LV Systolic Volume Index 15.5 mL/m2    LV Diastolic Volume Index 38.12 mL/m2    LVOT area 2.8 cm2    FS 32.5 28 - 44 %    Left Ventricle Relative Wall Thickness 0.45 cm    LV mass 101.4 g    LV Mass Index 56.0 g/m2    E/E' ratio 19 m/s    TOMÁS 18 mL/m2    LA Vol 33 cm3    RV/LV Ratio 0.63 cm    TOMÁS (MOD) 18 mL/m2    AV Velocity Ratio 0.69     AV index (prosthetic) 0.65     KHOI by Velocity Ratio 2.0 cm²    Mean e' 0.04 m/s    ZLVIDS -1.08     ZLVIDD -2.25     Narrative      Left Ventricle: The left ventricle is normal in size. Normal wall   thickness. There is low normal systolic function with a visually estimated   ejection fraction of 50 - 55%. Grade II diastolic dysfunction.    Right Ventricle: The right ventricle is normal in size. Wall thickness   is normal. Systolic function is normal.    IVC/SVC: Patient is ventilated, cannot use IVC diameter to estimate   right atrial pressure.

## 2025-04-10 NOTE — SUBJECTIVE & OBJECTIVE
Interval History:   NAEON.   ml/24h keeping up with intake.  CVP 4.  Kidney function remains stable.  Poor PO intake.    Review of patient's allergies indicates:   Allergen Reactions    Adhesive tape-silicones     Adhesive Rash     Pt states she removed a LATEX bandaid and the skin beneath was swollen and red. No other latex causes a reaction.     Current Facility-Administered Medications   Medication Frequency    0.9% NaCl infusion Continuous    acetaminophen tablet 650 mg Q6H PRN    amoxicillin-pot clavulanate 250-62.5 mg/5ml suspension 500 mg Q12H    D5W infusion Continuous    dextrose 50% injection 12.5 g PRN    dextrose 50% injection 25 g PRN    glucagon (human recombinant) injection 1 mg PRN    glucose chewable tablet 16 g PRN    glucose chewable tablet 24 g PRN    heparin (porcine) 2,000 Units in 0.9% NaCl 1,000 mL PRN    heparin infusion 1,000 units/500 ml in 0.9% NaCl (on sterile field) PRN    levothyroxine tablet 50 mcg Before breakfast    LIDOcaine 5 % patch 1 patch Q24H    miconazole nitrate 2% ointment BID    mupirocin 2 % ointment BID    polyethylene glycol packet 17 g BID    senna tablet 8.6 mg Daily    sodium chloride 0.9% flush 10 mL PRN       Objective:     Vital Signs (Most Recent):  Temp: 97 °F (36.1 °C) (04/10/25 1115)  Pulse: 89 (04/10/25 1149)  Resp: 18 (04/10/25 1115)  BP: (!) 151/68 (04/10/25 1115)  SpO2: (!) 94 % (04/10/25 1115) Vital Signs (24h Range):  Temp:  [97 °F (36.1 °C)-99.2 °F (37.3 °C)] 97 °F (36.1 °C)  Pulse:  [] 89  Resp:  [15-38] 18  SpO2:  [90 %-99 %] 94 %  BP: (114-174)/(51-75) 151/68     Weight: 75.9 kg (167 lb 5.3 oz) (04/10/25 0400)  Body mass index is 28.72 kg/m².  Body surface area is 1.85 meters squared.    I/O last 3 completed shifts:  In: 2175.8 [P.O.:50; I.V.:172; IV Piggyback:1953.8]  Out: 1160 [Urine:1160]     Physical Exam  Vitals and nursing note reviewed.   Constitutional:       Appearance: She is ill-appearing.   HENT:      Head: Atraumatic.    Eyes:      General: No scleral icterus.        Right eye: No discharge.         Left eye: No discharge.      Extraocular Movements: Extraocular movements intact.      Conjunctiva/sclera: Conjunctivae normal.   Cardiovascular:      Rate and Rhythm: Normal rate.      Comments: TVP in place  Pulmonary:      Effort: No tachypnea, accessory muscle usage or respiratory distress.      Breath sounds: Normal breath sounds. No wheezing or rales.   Musculoskeletal:      Right lower leg: Edema present.      Left lower leg: Edema present.   Skin:     General: Skin is warm.   Neurological:      Mental Status: She is alert.          Significant Labs:  CBC:   Recent Labs   Lab 04/10/25  0251   WBC 9.60   RBC 4.02   HGB 11.6*   HCT 36.6*   *   MCV 91   MCH 28.9   MCHC 31.7*     CMP:   Recent Labs   Lab 04/10/25  0251 04/10/25  0809   CALCIUM 9.3 9.6   ALBUMIN 1.9*  --     142   K 3.8 3.9   CO2 19* 18*   * 116*   BUN 36* 37*   CREATININE 1.7* 1.6*   ALKPHOS 80  --    ALT 14  --    AST 25  --    BILITOT 0.7  --

## 2025-04-11 NOTE — ASSESSMENT & PLAN NOTE
Patient with Hypoxic Respiratory failure which is Chronic.  she is on home oxygen at 2 LPM. Supplemental oxygen was provided and noted-       Signs/symptoms of respiratory failure include- tachypnea, increased work of breathing, use of accessory muscles, and lethargy. Contributing diagnoses includes - underlying COPD

## 2025-04-11 NOTE — SUBJECTIVE & OBJECTIVE
Interval History: NAEON. No acute complaints. S/p micra device yesterday. No hematoma noted at groin site.     ROS 12 point ROS negative except for HPI  Objective:     Vital Signs (Most Recent):  Temp: 98.1 °F (36.7 °C) (04/11/25 1600)  Pulse: 100 (04/11/25 1600)  Resp: 16 (04/11/25 1600)  BP: 128/71 (04/11/25 1600)  SpO2: 98 % (04/11/25 1600) Vital Signs (24h Range):  Temp:  [95.4 °F (35.2 °C)-98.4 °F (36.9 °C)] 98.1 °F (36.7 °C)  Pulse:  [] 100  Resp:  [13-30] 16  SpO2:  [86 %-100 %] 98 %  BP: (123-172)/(58-80) 128/71     Weight: 76 kg (167 lb 8.8 oz)  Body mass index is 28.76 kg/m².     SpO2: 98 %        Physical Exam  Constitutional:       General: She is not in acute distress.  Neck:      Comments: TVP and TLC  Cardiovascular:      Rate and Rhythm: Normal rate and regular rhythm.      Comments: Intermittently paced rhythm    Pulmonary:      Effort: Pulmonary effort is normal.      Breath sounds: Normal breath sounds.   Abdominal:      General: Bowel sounds are normal.      Palpations: Abdomen is soft.   Skin:     General: Skin is warm.   Neurological:      Mental Status: She is alert and oriented to person, place, and time.            Significant Labs: All pertinent lab results from the last 24 hours have been reviewed.    Significant Imaging: Echocardiogram: Transthoracic echo (TTE) complete (Cupid Only):   Results for orders placed or performed during the hospital encounter of 04/06/25   Echo   Result Value Ref Range    LV Diastolic Volume 69 mL    Echo EF Estimated 60 %    LV Systolic Volume 28 mL    IVS 0.8 0.6 - 1.1 cm    LVIDd 4.0 3.5 - 6.0 cm    LVIDs 2.7 2.1 - 4.0 cm    LVOT diameter 1.9 cm    PW 0.9 0.6 - 1.1 cm    AV LVOT peak gradient 3 mmHg    LVOT mn grad 2 mmHg    LVOT peak apurva 0.9 m/s    LVOT peak VTI 16.0 cm    RV- razo basal diam 2.5 cm    LA size 3.9 cm    Left Atrium Major Axis 4.3 cm    Left Atrium Minor Axis 4.0 cm    LA Vol (MOD) 32 mL    RA Major [place] 4.15 cm    AV valve area 1.8  cm2    AV area by cont VTI 1.8 cm2    AV peak gradient 7 mmHg    AV mean gradient 4 mmHg    Ao peak norberto 1.3 m/s    Ao VTI 24.8 cm    MV Peak A Norberto 1.19 m/s    E wave deceleration time 330 ms    MV Peak E Norberto 0.77 m/s    E/A ratio 0.65     LV LATERAL E/E' RATIO 25.7     LV SEPTAL E/E' RATIO 15.4     TDI LATERAL 0.03 m/s    TDI SEPTAL 0.05 m/s    MV peak gradient 5 mmHg    MV mean gradient 2 mmHg    TV peak gradient 22 mmHg    TR Max Norberto 2.3 m/s    Ascending aorta 2.61 cm    STJ 2.30 cm    Sinus 2.95 cm    LA WIDTH 2.4 cm    RA Width 3.25 cm    TAPSE 1.53 cm    BSA 1.85 m2    LVOT stroke volume 45.3 cm3    LV Systolic Volume Index 15.5 mL/m2    LV Diastolic Volume Index 38.12 mL/m2    LVOT area 2.8 cm2    FS 32.5 28 - 44 %    Left Ventricle Relative Wall Thickness 0.45 cm    LV mass 101.4 g    LV Mass Index 56.0 g/m2    E/E' ratio 19 m/s    TOMÁS 18 mL/m2    LA Vol 33 cm3    RV/LV Ratio 0.63 cm    TOMÁS (MOD) 18 mL/m2    AV Velocity Ratio 0.69     AV index (prosthetic) 0.65     KHOI by Velocity Ratio 2.0 cm²    Mean e' 0.04 m/s    ZLVIDS -1.08     ZLVIDD -2.25     Narrative      Left Ventricle: The left ventricle is normal in size. Normal wall   thickness. There is low normal systolic function with a visually estimated   ejection fraction of 50 - 55%. Grade II diastolic dysfunction.    Right Ventricle: The right ventricle is normal in size. Wall thickness   is normal. Systolic function is normal.    IVC/SVC: Patient is ventilated, cannot use IVC diameter to estimate   right atrial pressure.

## 2025-04-11 NOTE — ASSESSMENT & PLAN NOTE
Ms. Misa Barajas is a 78 yo female with COPD on 2L home O2, HFpEF, AFL s/p RFA (6/17/24 w/ Dr. Church), CKD III, HTN, HLD, chronic venous insufficiency, orthostatic hypotension who was admitted to the hospital for complete heart block and shock. EP consulted for pacemaker evaluation.     Recommendation:   - s/p micra placment. No signs of hematoma at groin site.   - Thank you for the consult. EP will sign off. Please call with any questions.

## 2025-04-11 NOTE — RESIDENT HANDOFF
Transfer of Care Note  CCU to Hospital Medicine    Admit Date: 4/6/2025  LOS: 5    CC: Complete heart block    Code Status: Full Code    Transfer to Hospital Medicine Team C discussed with Dr Singer.    Subjective:     HPI: Ms. Misa Barajas is a 78 yo female with COPD on 2L home O2, HFpEF, AFL s/p RFA (6/17/24 w/ Dr. Church), CKD III, HTN, HLD, chronic venous insufficiency, orthostatic hypotension who was brought via EMS after per history she was found confused and bradycardiac with HR in the 20s.They started epi and transcutaneous pacing. Given Versed for the transcutaneous pacing. They had to bag her in route. We do not have strip of underlying rhythm. Patient obtunded for which she was intubated on arrival to the ED and transcutaneous pacing was exchanged to transvenous pacing via RIJ. Underlying rate 44 bifascicular block. Known RBBB. Per daughters patient had called them over the phone when they noticed she appeared confused. Due to concerns for a possible syncopal episode EMS was called. Prior to event patient had been complaining of malaise and nausea for which she took about two Zofran pills per the daughters.     She had been recently seen on 3/27/25 at the [facility] visit following recent admission for AHRF 2/2 ADHF and COPD exacerbation. On ED presentation BNP elevated to 2500, troponin elevated to 19, CXR with pulmonary edema. She was started on steroids, nebs and antibiotics in addition to IV diuresis. Updated TTE stable from prior, showing HFpEF with elevated CVP 15. Her O2 requirements improved to baseline with diuresis and she was subsequently discharged. At that visit patient had not required midodrine as blood pressure had remained stable and she was asymptomatic.     Off note, patient was taken off OAC due to bleeding issues and no recurrent of typical atrial flutter after ablation.                   Hospital/ICU Course: Admitted to CCU for CHB in s/o hyperkalemia & in the presence of  conduction disease/RBBB & LAHB - stable w/ temporary wire. Septic shock d/t UTI; vasopressors weaned off. HTN; started NG gtt. Acute on chronic hypoxic/hypercapnic RF & obtunded; intubated. Successfully extubated 04/08. MARQUEZ on CKD; improved w/ good UOP response to diuresis. PVCs w/ intermittent pacing by TVP. Switched pip-tazo to amox-clav on 04/09; continued to improve; empiric EED 04/13 for 7 day course. Micra PM by EP placed 04/10. Medically stable for stepdown for placement (very weak).    Medically stable for stepdown to  for further management.    To Follow Up:     Apixaban x30 days for new L IJ thrombus  PT/OT  Placement (SNF vs rehab)  Finish empiric abx course    Discharge Plan:     SNF vs rehab, pending PT/OT    Call/message with any questions.

## 2025-04-11 NOTE — PLAN OF CARE
CICU DAILY GOALS       A: Awake    RASS: Goal -    Actual - RASS (Manjarrez Agitation-Sedation Scale): alert and calm   Restraint necessity: Clinical Justification: Removing medical devices, Treatment Interference  B: Breath   SBT: Not intubated   C: Coordinate A & B, analgesics/sedatives   Pain: managed    SAT: Not intubated  D: Delirium   CAM-ICU:    E: Early(intubated/ Progressive (non-intubated) Mobility   MOVE Screen: Pass   Activity: Activity Management: Rolling - L1  FAS: Feeding/Nutrition   Diet order: Diet/Nutrition Received: clear liquid,   Fluid restriction:     T: Thrombus   DVT prophylaxis: VTE Core Measure: (SCDs) Sequential compression device initiated/maintained  H: HOB Elevation   Head of Bed (HOB) Positioning: HOB elevated  U: Ulcer Prophylaxis   GI: yes  G: Glucose control   managed    S: Skin   Bathing/Skin Care: bath, complete;dressed/undressed;linen changed;incontinence care (04/11/25 1043)  Wounds: No  Wound care consulted: No  B: Bowel Function   diarrhea   I: Indwelling Catheters   Muniz necessity:      Urethral Catheter 04/06/25 0645-Reason for Continuing Urinary Catheterization: Critically ill in ICU and requiring hourly monitoring of intake/output   CVC necessity: No  D: De-escalation Antibx   No  Plan for the day   V/S monitoring     Family/Goals of care/Code Status   Code Status: Full Code     No acute events throughout day, VS and assessment per flow sheet, patient progressing towards goals as tolerated, plan of care reviewed with Misa Barajas and daughters, all concerns addressed.

## 2025-04-11 NOTE — ASSESSMENT & PLAN NOTE
Now MARQUEZ on CKD3. Nephrology consulted. Patient on bumex and potassium supplement as home which have been held    .BMP reviewed- noted Estimated Creatinine Clearance: 19.6 mL/min (A) (based on SCr of 2.1 mg/dL (H)). according to latest data. Based on current GFR, CKD stage is stage 4 - GFR 15-29.  Monitor UOP and serial BMP and adjust therapy as needed. Renally dose meds. Avoid nephrotoxic medications and procedures.  Recent Labs     04/10/25  0251 04/10/25  0809 04/11/25  0342   CREATININE 1.7* 1.6* 1.4   EGFRNORACEVR 30* 32* 38*     - correcting electrolytes as needed. Unclear if hypercalcemia was secondary to medications but repeating levels

## 2025-04-11 NOTE — ASSESSMENT & PLAN NOTE
Anemia is likely due to chronic blood loss. Most recent hemoglobin and hematocrit are listed below.  Recent Labs     04/09/25  0221 04/10/25  0251 04/11/25  0342   HGB 11.3* 11.6* 11.3*   HCT 35.6* 36.6* 36.3*     Plan  - Monitor serial CBC: Daily  - Transfuse PRBC if patient becomes hemodynamically unstable, symptomatic or H/H drops below 7/21.  - Patient has not received any PRBC transfusions to date  - Patient's anemia is currently stable  - holding iron supplementation while intubated

## 2025-04-11 NOTE — ASSESSMENT & PLAN NOTE
Ms. Misa Barajas is a 80 yo female with COPD on 2L home O2, HFpEF, AFL s/p RFA (6/17/24 w/ Dr. Church), CKD III, HTN, HLD, chronic venous insufficiency, orthostatic hypotension who was brought via EMS after per history she was found confused and bradycardiac with HR in the 20s.They started epi and transcutaneous pacing. They had to bag her in route. We do not have strip of underlying rhythm. Patient obtunded for which she was intubated on arrival to the ED and transcutaneous pacing was exchanged to transvenous pacing via RIJ. Underlying rate 44 bifascicular block. Known RBBB.    -Unclear etiology ddx include sepsis, mesenteric ischemia?  - elevated lactic acid. Awaiting CT h/c/a/p; resolved  - bradycardia most likely in the setting of electrolyte disturbance and abnormal thyroid function  - correcting electrolyte disturbance. Suspect most likely she will then not require tvp.   - obtaining bcx and ucx. Broad spectrum abx ppx and deescalating as needed.   No cx growth, empiric tx for urosepsis w/ pip-tazo (start 04/06)  Complete amox-clav (EED 04/13) for 7 day course  - orosco placement  EP following  S/p Micra PM on 04/10

## 2025-04-11 NOTE — PT/OT/SLP PROGRESS
"Speech Language Pathology Treatment    Patient Name:  Misa Barajas   MRN:  [id number]  Admitting Diagnosis: Complete heart block    Recommendations:                 General Recommendations:   ongoing swallow  Diet recommendations:  Soft & Bite Sized Diet - IDDSI Level 6, Liquid Diet Level: Thin liquids - IDDSI Level 0   Aspiration Precautions: 1 bite/sip at a time, Assistance with meals, Frequent oral care, HOB to 90 degrees, Meds crushed in puree, Monitor for s/s of aspiration, No straws, Small bites/sips, and Strict aspiration precautions   General Precautions: Standard, aspiration, dental soft  Communication strategies:  go to room if call light pushed    Assessment:     Misa Barajas is a 79 y.o. female with an SLP diagnosis of Dysphagia. Recommending diet upgrade to soft and bite size consistency. SLP to follow.     Subjective     "I feel much better"    Pain/Comfort:  Pain Rating 1: 0/10    Respiratory Status: Nasal cannula, flow 2 L/min    Objective:     Has the patient been evaluated by SLP for swallowing?   Yes  Keep patient NPO? No    Pt seen for diet tolerance this date. Per RN, pt adequately tolerating diet though demonstrates decreased intake. RN endorsed pt benefits from feeding assistance. Pt awake upon SLP arrival. Pt endorsed adequate tolerance of minced and moist consistency though reported decreased appetite. Pt accepted minced and moist consistency from lunch tray x2 with timely AP transport with no overt signs of aspiration. Pt accepted regular solid consistency x2 resulting in mildly increased mastication time with trace lingual residue. Pt expressed interest in diet upgrade 2/2 dissatisfaction with current texture. SLP educated pt re: SLP services, diet recs, ongoing assessment, aspiration precautions, feeding assistance, and poc to which she verbalized understanding. Recommend diet upgrade to soft and bite size. SLP to follow for tolerance. Discussed findings and recs with RN whom was in " agreement with SLP poc.    Goals:   Multidisciplinary Problems       SLP Goals          Problem: SLP    Goal Priority Disciplines Outcome   SLP Goal     SLP    Description: Speech Language Pathology Goals  Goals expected to be met by 4/17    1. Pt will participate in ongoing swallow assessment                                Plan:     Patient to be seen:  4 x/week   Plan of Care expires:     Plan of Care reviewed with:  patient   SLP Follow-Up:  Yes       Discharge recommendations:      Barriers to Discharge:  None    Time Tracking:     SLP Treatment Date:   04/11/25  Speech Start Time:  1402  Speech Stop Time:  1418     Speech Total Time (min):  16 min    Billable Minutes: Treatment Swallowing Dysfunction 8 and Self Care/Home Management Training 8    04/11/2025

## 2025-04-11 NOTE — SUBJECTIVE & OBJECTIVE
Interval History: NAEON; doing well, AF HDS on 2 L NC; labs stable. S/p Micra PM tday. On amox-clav (EED 04/13, 7 day course) for urosepsis. Furosemide for hypervolemia. LUE US to eval for DVT. S/d to  for placement.    ROS  Objective:     Vital Signs (Most Recent):  Temp: 98.4 °F (36.9 °C) (04/11/25 0600)  Pulse: 76 (04/11/25 0600)  Resp: (!) 30 (04/11/25 0600)  BP: (!) 128/58 (04/11/25 0600)  SpO2: 96 % (04/11/25 0600) Vital Signs (24h Range):  Temp:  [95.4 °F (35.2 °C)-98.4 °F (36.9 °C)] 98.4 °F (36.9 °C)  Pulse:  [] 76  Resp:  [13-30] 30  SpO2:  [90 %-100 %] 96 %  BP: (123-171)/(58-73) 128/58     Weight: 76 kg (167 lb 8.8 oz)  Body mass index is 28.76 kg/m².     SpO2: 96 %     Intake/Output Summary (Last 24 hours) at 4/11/2025 0718  Last data filed at 4/11/2025 0600  Gross per 24 hour   Intake 1166.78 ml   Output 688 ml   Net 478.78 ml     Lines/Drains/Airways       Drain  Duration                  Urethral Catheter 04/06/25 0645 5 days              Peripheral Intravenous Line  Duration                  Peripheral IV - Single Lumen 04/06/25 0216 18 G Posterior;Right Forearm 5 days         Peripheral IV - Single Lumen 04/10/25 1520 20 G 1 3/4 in Anterior;Right Forearm <1 day         Peripheral IV - Single Lumen 04/11/25 0427 22 G Anterior;Right Hand <1 day                   Physical Exam  Vitals and nursing note reviewed.   Constitutional:       General: She is not in acute distress.     Appearance: She is ill-appearing.   HENT:      Head: Normocephalic and atraumatic.      Right Ear: External ear normal.      Left Ear: External ear normal.      Mouth/Throat:      Mouth: Mucous membranes are moist.      Pharynx: No oropharyngeal exudate.   Eyes:      General: No scleral icterus.     Extraocular Movements: Extraocular movements intact.      Pupils: Pupils are equal, round, and reactive to light.   Cardiovascular:      Rate and Rhythm: Normal rate.      Pulses:           Carotid pulses are 2+ on the right  side and 2+ on the left side.       Radial pulses are 2+ on the right side and 2+ on the left side.        Dorsalis pedis pulses are 1+ on the right side and 1+ on the left side.        Posterior tibial pulses are 1+ on the right side and 1+ on the left side.      Heart sounds: Normal heart sounds. No murmur heard.     Comments: Intermittently paced rhythm  Pulmonary:      Effort: Pulmonary effort is normal. No respiratory distress.      Breath sounds: Normal breath sounds. No wheezing or rales.   Abdominal:      General: Abdomen is flat. Bowel sounds are normal. There is distension.      Palpations: Abdomen is soft.      Tenderness: There is no abdominal tenderness.   Musculoskeletal:         General: No signs of injury. Normal range of motion.      Cervical back: Normal range of motion.      Right lower leg: Edema present.      Left lower leg: Edema present.   Skin:     General: Skin is warm and dry.      Coloration: Skin is pale.   Neurological:      General: No focal deficit present.      Mental Status: She is alert.      Sensory: No sensory deficit.      Motor: No weakness.   Psychiatric:         Mood and Affect: Mood normal.         Behavior: Behavior normal.         Thought Content: Thought content normal.      Significant Labs: All pertinent lab results from the last 24 hours have been reviewed.    Significant Imaging:  reviewed

## 2025-04-11 NOTE — PROGRESS NOTES
[facility]  Cardiology  Progress Note    Patient Name: Misa Barajas  MRN: [id number]  Admission Date: 4/6/2025  Hospital Length of Stay: 5 days  Code Status: Full Code  Attending Physician: Chano Lord MD  Primary Care Provider: Isela Langston MD  Expected Discharge Date: 4/15/2025  Principal Problem: Complete heart block    Subjective   Hospital Course:  Admitted to CCU for CHB in s/o hyperkalemia & in the presence of conduction disease/RBBB & LAHB - stable w/ temporary wire. Septic shock d/t UTI; vasopressors weaned off. HTN; started NG gtt. Acute on chronic hypoxic/hypercapnic RF & obtunded; intubated. Successfully extubated 04/08. MARQUEZ on CKD; improved w/ good UOP response to diuresis. PVCs w/ intermittent pacing by TVP. Switched pip-tazo to amox-clav on 04/09; continued to improve; empiric EED 04/13 for 7 day course. Micra PM by EP placed 04/10. Medically stable for stepdown for placement (very weak).    Objective    Interval History: NAEON; doing well, AF HDS on 2 L NC; labs stable. S/p Micra PM tday. On amox-clav (EED 04/13, 7 day course) for urosepsis. Furosemide for hypervolemia. LUE US to eval for DVT. S/d to  for placement.    ROS  Objective:     Vital Signs (Most Recent):  Temp: 98.4 °F (36.9 °C) (04/11/25 0600)  Pulse: 76 (04/11/25 0600)  Resp: (!) 30 (04/11/25 0600)  BP: (!) 128/58 (04/11/25 0600)  SpO2: 96 % (04/11/25 0600) Vital Signs (24h Range):  Temp:  [95.4 °F (35.2 °C)-98.4 °F (36.9 °C)] 98.4 °F (36.9 °C)  Pulse:  [] 76  Resp:  [13-30] 30  SpO2:  [90 %-100 %] 96 %  BP: (123-171)/(58-73) 128/58     Weight: 76 kg (167 lb 8.8 oz)  Body mass index is 28.76 kg/m².     SpO2: 96 %     Intake/Output Summary (Last 24 hours) at 4/11/2025 0718  Last data filed at 4/11/2025 0600  Gross per 24 hour   Intake 1166.78 ml   Output 688 ml   Net 478.78 ml     Lines/Drains/Airways       Drain  Duration                  Urethral Catheter 04/06/25 0645 5 days               Peripheral Intravenous Line  Duration                  Peripheral IV - Single Lumen 04/06/25 0216 18 G Posterior;Right Forearm 5 days         Peripheral IV - Single Lumen 04/10/25 1520 20 G 1 3/4 in Anterior;Right Forearm <1 day         Peripheral IV - Single Lumen 04/11/25 0427 22 G Anterior;Right Hand <1 day                   Physical Exam  Vitals and nursing note reviewed.   Constitutional:       General: She is not in acute distress.     Appearance: She is ill-appearing.   HENT:      Head: Normocephalic and atraumatic.      Right Ear: External ear normal.      Left Ear: External ear normal.      Mouth/Throat:      Mouth: Mucous membranes are moist.      Pharynx: No oropharyngeal exudate.   Eyes:      General: No scleral icterus.     Extraocular Movements: Extraocular movements intact.      Pupils: Pupils are equal, round, and reactive to light.   Cardiovascular:      Rate and Rhythm: Normal rate.      Pulses:           Carotid pulses are 2+ on the right side and 2+ on the left side.       Radial pulses are 2+ on the right side and 2+ on the left side.        Dorsalis pedis pulses are 1+ on the right side and 1+ on the left side.        Posterior tibial pulses are 1+ on the right side and 1+ on the left side.      Heart sounds: Normal heart sounds. No murmur heard.     Comments: Intermittently paced rhythm  Pulmonary:      Effort: Pulmonary effort is normal. No respiratory distress.      Breath sounds: Normal breath sounds. No wheezing or rales.   Abdominal:      General: Abdomen is flat. Bowel sounds are normal. There is distension.      Palpations: Abdomen is soft.      Tenderness: There is no abdominal tenderness.   Musculoskeletal:         General: No signs of injury. Normal range of motion.      Cervical back: Normal range of motion.      Right lower leg: Edema present.      Left lower leg: Edema present.   Skin:     General: Skin is warm and dry.      Coloration: Skin is pale.   Neurological:      General:  No focal deficit present.      Mental Status: She is alert.      Sensory: No sensory deficit.      Motor: No weakness.   Psychiatric:         Mood and Affect: Mood normal.         Behavior: Behavior normal.         Thought Content: Thought content normal.      Significant Labs: All pertinent lab results from the last 24 hours have been reviewed.    Significant Imaging:  reviewed  Assessment & Plan  Complete heart block  Shock  Ms. Misa Barajas is a 78 yo female with COPD on 2L home O2, HFpEF, AFL s/p RFA (6/17/24 w/ Dr. Church), CKD III, HTN, HLD, chronic venous insufficiency, orthostatic hypotension who was brought via EMS after per history she was found confused and bradycardiac with HR in the 20s.They started epi and transcutaneous pacing. They had to bag her in route. We do not have strip of underlying rhythm. Patient obtunded for which she was intubated on arrival to the ED and transcutaneous pacing was exchanged to transvenous pacing via RIJ. Underlying rate 44 bifascicular block. Known RBBB.    -Unclear etiology ddx include sepsis, mesenteric ischemia?  - elevated lactic acid. Awaiting CT h/c/a/p; resolved  - bradycardia most likely in the setting of electrolyte disturbance and abnormal thyroid function  - correcting electrolyte disturbance. Suspect most likely she will then not require tvp.   - obtaining bcx and ucx. Broad spectrum abx ppx and deescalating as needed.   No cx growth, empiric tx for urosepsis w/ pip-tazo (start 04/06)  Complete amox-clav (EED 04/13) for 7 day course  - orosco placement  EP following  S/p Micra PM on 04/10  RBBB  Chronic   CKD (chronic kidney disease) stage 3, GFR 30-59 ml/min  MARQUEZ (acute kidney injury)  Now MARQUEZ on CKD3. Nephrology consulted. Patient on bumex and potassium supplement as home which have been held    .BMP reviewed- noted Estimated Creatinine Clearance: 19.6 mL/min (A) (based on SCr of 2.1 mg/dL (H)). according to latest data. Based on current GFR, CKD stage is  "stage 4 - GFR 15-29.  Monitor UOP and serial BMP and adjust therapy as needed. Renally dose meds. Avoid nephrotoxic medications and procedures.  Recent Labs     04/10/25  0251 04/10/25  0809 04/11/25  0342   CREATININE 1.7* 1.6* 1.4   EGFRNORACEVR 30* 32* 38*     - correcting electrolytes as needed. Unclear if hypercalcemia was secondary to medications but repeating levels  Centrilobular emphysema  Prior history of smoking. Continue scheduled inhalers once extubated. Antibiotics and Supplemental oxygen and monitor respiratory status closely.   Chronic respiratory failure with hypoxia  Patient with Hypoxic Respiratory failure which is Chronic.  she is on home oxygen at 2 LPM. Supplemental oxygen was provided and noted-       Signs/symptoms of respiratory failure include- tachypnea, increased work of breathing, use of accessory muscles, and lethargy. Contributing diagnoses includes - underlying COPD   Acute on chronic congestive heart failure   Most recent BNP and echo results are listed below.  No results for input(s): "BNP" in the last 72 hours.    Latest ECHO  Results for orders placed during the hospital encounter of 01/29/25    Echo    Interpretation Summary    Left Ventricle: The left ventricle is normal in size. Normal wall thickness. Normal wall motion. There is normal systolic function. Ejection fraction is approximately 55%.    Right Ventricle: Normal right ventricular cavity size. Wall thickness is normal. Systolic function is borderline low.    Left Atrium: Left atrium is severely dilated.    Mitral Valve: There is moderate posterior mitral annular calcification present.    Tricuspid Valve: There is mild regurgitation.    Pulmonary Artery: The estimated pulmonary artery systolic pressure is 51 mmHg.    IVC/SVC: Elevated venous pressure at 15 mmHg.    Current Heart Failure Medications: have been held due to bradycardia and MARQUEZ       Plan  - Monitor strict I&Os and daily weights.    - Place on telemetry  - " repeating official echo, bedside echo appears to have normal LVEF, IVC cannot be assessed as patient intubated  Orthostatic hypotension  holding midodrine and per history patient had not required it since last hospitalization  Iron deficiency anemia due to chronic blood loss  Anemia is likely due to chronic blood loss. Most recent hemoglobin and hematocrit are listed below.  Recent Labs     04/09/25  0221 04/10/25  0251 04/11/25  0342   HGB 11.3* 11.6* 11.3*   HCT 35.6* 36.6* 36.3*     Plan  - Monitor serial CBC: Daily  - Transfuse PRBC if patient becomes hemodynamically unstable, symptomatic or H/H drops below 7/21.  - Patient has not received any PRBC transfusions to date  - Patient's anemia is currently stable  - holding iron supplementation while intubated  Subclinical hypothyroidism  TSH 10.722 with normal FT4.  Endo consulted  Levothyroxine     VTE Risk Mitigation (From admission, onward)           Ordered     heparin (porcine) injection 5,000 Units  Every 8 hours         04/11/25 0922     heparin (porcine) 2,000 Units in 0.9% NaCl 1,000 mL  As needed (PRN)         04/10/25 1256     heparin infusion 1,000 units/500 ml in 0.9% NaCl (on sterile field)  As needed (PRN)         04/10/25 1256     IP VTE HIGH RISK PATIENT  Once         04/06/25 0512     Place sequential compression device  Until discontinued         04/06/25 0454                  Talha Salcido MD  Cardiology  [facility]

## 2025-04-11 NOTE — PROGRESS NOTES
[facility] Card[facility]  Cardiac Electrophysiology  Progress Note    Admission Date: 4/6/2025  Code Status: Full Code   Attending Physician: Chano Lord MD   Expected Discharge Date: 4/15/2025  Principal Problem:Complete heart block    Subjective:     Interval History: NAEON. No acute complaints. S/p micra device yesterday. No hematoma noted at groin site.     ROS 12 point ROS negative except for HPI  Objective:     Vital Signs (Most Recent):  Temp: 98.1 °F (36.7 °C) (04/11/25 1600)  Pulse: 100 (04/11/25 1600)  Resp: 16 (04/11/25 1600)  BP: 128/71 (04/11/25 1600)  SpO2: 98 % (04/11/25 1600) Vital Signs (24h Range):  Temp:  [95.4 °F (35.2 °C)-98.4 °F (36.9 °C)] 98.1 °F (36.7 °C)  Pulse:  [] 100  Resp:  [13-30] 16  SpO2:  [86 %-100 %] 98 %  BP: (123-172)/(58-80) 128/71     Weight: 76 kg (167 lb 8.8 oz)  Body mass index is 28.76 kg/m².     SpO2: 98 %        Physical Exam  Constitutional:       General: She is not in acute distress.  Cardiovascular:      Rate and Rhythm: Normal rate and regular rhythm.      Comments: Paced    Pulmonary:      Effort: Pulmonary effort is normal.      Breath sounds: Normal breath sounds.   Abdominal:      General: Bowel sounds are normal.      Palpations: Abdomen is soft.   Skin:     General: Skin is warm.   Neurological:      Mental Status: She is alert and oriented to person, place, and time.            Significant Labs: All pertinent lab results from the last 24 hours have been reviewed.    Significant Imaging: Echocardiogram: Transthoracic echo (TTE) complete (Cupid Only):   Results for orders placed or performed during the hospital encounter of 04/06/25   Echo   Result Value Ref Range    LV Diastolic Volume 69 mL    Echo EF Estimated 60 %    LV Systolic Volume 28 mL    IVS 0.8 0.6 - 1.1 cm    LVIDd 4.0 3.5 - 6.0 cm    LVIDs 2.7 2.1 - 4.0 cm    LVOT diameter 1.9 cm    PW 0.9 0.6 - 1.1 cm    AV LVOT peak gradient 3 mmHg    LVOT mn grad 2 mmHg    LVOT peak apurva  0.9 m/s    LVOT peak VTI 16.0 cm    RV- razo basal diam 2.5 cm    LA size 3.9 cm    Left Atrium Major Axis 4.3 cm    Left Atrium Minor Axis 4.0 cm    LA Vol (MOD) 32 mL    RA Major [place] 4.15 cm    AV valve area 1.8 cm2    AV area by cont VTI 1.8 cm2    AV peak gradient 7 mmHg    AV mean gradient 4 mmHg    Ao peak norberto 1.3 m/s    Ao VTI 24.8 cm    MV Peak A Norberto 1.19 m/s    E wave deceleration time 330 ms    MV Peak E Norberto 0.77 m/s    E/A ratio 0.65     LV LATERAL E/E' RATIO 25.7     LV SEPTAL E/E' RATIO 15.4     TDI LATERAL 0.03 m/s    TDI SEPTAL 0.05 m/s    MV peak gradient 5 mmHg    MV mean gradient 2 mmHg    TV peak gradient 22 mmHg    TR Max Norberto 2.3 m/s    Ascending aorta 2.61 cm    STJ 2.30 cm    Sinus 2.95 cm    LA WIDTH 2.4 cm    RA Width 3.25 cm    TAPSE 1.53 cm    BSA 1.85 m2    LVOT stroke volume 45.3 cm3    LV Systolic Volume Index 15.5 mL/m2    LV Diastolic Volume Index 38.12 mL/m2    LVOT area 2.8 cm2    FS 32.5 28 - 44 %    Left Ventricle Relative Wall Thickness 0.45 cm    LV mass 101.4 g    LV Mass Index 56.0 g/m2    E/E' ratio 19 m/s    TOMÁS 18 mL/m2    LA Vol 33 cm3    RV/LV Ratio 0.63 cm    TOMÁS (MOD) 18 mL/m2    AV Velocity Ratio 0.69     AV index (prosthetic) 0.65     KHOI by Velocity Ratio 2.0 cm²    Mean e' 0.04 m/s    ZLVIDS -1.08     ZLVIDD -2.25     Narrative      Left Ventricle: The left ventricle is normal in size. Normal wall   thickness. There is low normal systolic function with a visually estimated   ejection fraction of 50 - 55%. Grade II diastolic dysfunction.    Right Ventricle: The right ventricle is normal in size. Wall thickness   is normal. Systolic function is normal.    IVC/SVC: Patient is ventilated, cannot use IVC diameter to estimate   right atrial pressure.       Assessment and Plan:     * Complete heart block  Ms. Misa Barajas is a 78 yo female with COPD on 2L home O2, HFpEF, AFL s/p RFA (6/17/24 w/ Dr. Church), CKD III, HTN, HLD, chronic venous insufficiency, orthostatic  hypotension who was admitted to the hospital for complete heart block and shock. EP consulted for pacemaker evaluation.     Recommendation:   - s/p micra placment. No signs of hematoma at groin site.   - Thank you for the consult. EP will sign off. Please call with any questions.         Yajaira Menjivar MD  Cardiac Electrophysiology  [facility]

## 2025-04-11 NOTE — PLAN OF CARE
No acute events throughout day. See vital signs and assessments in flowsheets. See below for updates on today's progress.     Pulmonary: 2L NC chronic home use    Cardiovascular: Permanent Pacemaker VDD. +2 palpable DP pulses post procedure.     Neurological: AOX4    Genitourinary: Total output 343 ml/shift     Endocrine: Q6hr accucheck     Integumentary/Other: No new skin breakdown. Pt assisted with weight shifting and position changes Q2hrs. Pt encouraged to eat, ate 1 Jello pack and cup of juice. Upper extremities edematous and weepingUriel MD with primary team aware. Pt educated on importance of keeping arm immobile, sling in place.     Infusions: None    Patient progressing towards goals as tolerated, plan of care communicated and reviewed with Misa Barajas and family. All concerns addressed. Will continue to monitor.

## 2025-04-12 NOTE — NURSING
Notified  of tachycardia. Pt heart rate going up to 130 s but not sustaining. No sob or cheat pain at this time. New order added for Metoprolol 25 mg.         @4:30 notified  to question if we are removing orosco or will it remain in place.She will discuss with day time for interventions. Will pass on to day shift nurse.

## 2025-04-12 NOTE — HPI
Ms. Misa Barajas is a 80 yo female with COPD on 2L home O2, HFpEF, AFL s/p RFA (6/17/24 w/ Dr. Church), CKD III, HTN, HLD, chronic venous insufficiency, orthostatic hypotension who was brought via EMS after per history she was found confused and bradycardiac with HR in the 20s.They started epi and transcutaneous pacing. Given Versed for the transcutaneous pacing. They had to bag her in route. We do not have strip of underlying rhythm. Patient obtunded for which she was intubated for airway protection on arrival to the ED and transcutaneous pacing was exchanged to transvenous pacing via RIJ (threshold 0.6). Underlying rate 44 bifascicular block. Known RBBB. Per daughters patient had called them over the phone when they noticed she appeared confused. Due to concerns for a possible syncopal episode EMS was called. Prior to event patient had been complaining of malaise and nausea for which she took about two Zofran pills per the daughters.      She had been recently seen on 3/27/25 at the [facility] visit following recent admission for AHRF 2/2 ADHF and COPD exacerbation. On ED presentation BNP elevated to 2500, troponin elevated to 19, CXR with pulmonary edema. She was started on steroids, nebs and antibiotics in addition to IV diuresis. Updated TTE stable from prior, showing HFpEF with elevated CVP 15. Her O2 requirements improved to baseline with diuresis and she was subsequently discharged. At that visit patient had not required midodrine as blood pressure had remained stable and she was asymptomatic.      Off note, patient was taken off OAC due to bleeding issues and no recurrent of typical atrial flutter after ablation.

## 2025-04-12 NOTE — ASSESSMENT & PLAN NOTE
"Most recent BNP and echo results are listed below.  No results for input(s): "BNP" in the last 72 hours.     Echo  Result Date: 4/7/2025    Left Ventricle: The left ventricle is normal in size. Normal wall   thickness. There is low normal systolic function with a visually estimated   ejection fraction of 50 - 55%. Grade II diastolic dysfunction.    Right Ventricle: The right ventricle is normal in size. Wall thickness   is normal. Systolic function is normal.    IVC/SVC: Patient is ventilated, cannot use IVC diameter to estimate   right atrial pressure.            Current Heart Failure Medications: have been held due to bradycardia and MARQUEZ     Plan  - Monitor strict I&Os and daily weights.    - Place on telemetry    "

## 2025-04-12 NOTE — NURSING
Pt c/o dyspnea. O2 sat at 3L is 90-91%. O2 increased to 4L/min with an improvement in pt symptoms and Spo2 at 96%. /72  with . 250 ml of Urine output since lasix 40mg IVP at 1440.  MD Diandra notified. CBC and CMP ordered.        04/12/25 1730   Vital Signs   Pulse 92   Heart Rate Source Monitor   Resp 16   SpO2 96 %   Flow (L/min) (Oxygen Therapy) 4   Device (Oxygen Therapy) nasal cannula   BP (!) 154/72   MAP (mmHg) 104   BP Location Right arm   BP Method Automatic   Patient Position Lying

## 2025-04-12 NOTE — PLAN OF CARE
Problem: Occupational Therapy  Goal: Occupational Therapy Goal  Description: Goals to be met by: 5/13/2025     Patient will increase functional independence with ADLs by performing:    UE Dressing with Minimal Assistance.  LE Dressing with Moderate Assistance.  Grooming while standing at sink with Contact Guard Assistance.  Toileting from bedside commode with Minimal Assistance for hygiene and clothing management.   Step transfer with Moderate Assistance  Toilet transfer to bedside commode with Moderate Assistance.    Outcome: Progressing

## 2025-04-12 NOTE — PLAN OF CARE
Pt maintained free from falls/trauma/injuries and skin breakdown. Pt denied pain or discomfort  Plan of care reviewed. Pt verbalized understanding. All questions and concerns addressed.

## 2025-04-12 NOTE — PLAN OF CARE
Report called to GINGER Caraballo. Telemetry room notified regarding patient being switched over to box 0893. Patient to be transported via CICU RN to bed 304.

## 2025-04-12 NOTE — PROGRESS NOTES
04/12/25 1230   Vital Signs   BP (!) 173/74   BP Location Right arm   BP Method Automatic   Patient Position Lying     Dr. Dominga Jim aware. Hydralazine ordered.

## 2025-04-12 NOTE — HOSPITAL COURSE
"Admitted to CCU for CHB in s/o hyperkalemia & in the presence of conduction disease/RBBB & LAHB - stable w/ temporary wire. ??Septic shock deemed to be d/t UTI - ?? UA with only 7 WBCs); vasopressors weaned off. HTN; started NG gtt. Acute on chronic hypoxic/hypercapnic RF & obtunded; intubated. Successfully extubated 04/08. MARQUEZ on CKD; improved w/ good UOP response to diuresis. PVCs w/ intermittent pacing by TVP. Switched pip-tazo to amox-clav on 04/09; continued to improve; empiric EED 04/13 for 7 day course. Micra PM by EP placed 04/10. Medically stable for stepdown. Patient was extremely short of breath with physical therapy, chest x-ray concerning for pulmonary edema.  Started patient on IV diuresis, now euvolemic.  ENT was consulted yesterday due to hoarseness of voice.  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 Pt uninterested in any procedure at this time, ENT recommended SLP follow up.  Able to tolerate regular diet at this point.  Now agreeable for SNF, working on placement. Course c/b worsening hypoxia & hyperNa.      Overnight 4/18-4/19, decompensated with recurrent sepsis, hypothermia 92, possible PNA with infiltrates on CXR, Zosyn IV started. Remained hypothermic despite Zosyn x 2 doses and Manny hugger in place (pt uncomfortable/hot and asking for removal), so added Vanc IV STAT. Checked TSH and FT4-  wnl. UA with pyuria however not a clean catch w 33 sq epi, so repeated UA. C/o "stomach" pain, overall picture with the "stomach" pain and septic picture is "exactly what led us to ICU 2 weeks ago" per daughter. +TTP RUQ, RLQ, LLQ.   Poor UOP, only 200cc by mid-shift, and still hyperNa with FWD 1.8L so started gentle hydration with D5W @ 75cc for 24 hrs. May need to diurese again after sepsis resolves. CT C/A/P noncon with pulm edema, B pleural effusions, possible PNA, no intra-abdominal process seen.     On 4/21, Mental status much improved, however still with " mild confusion and feels very tired.   decompensated further with recurrent MARQUEZ, C/o new SOB and hypoxemic 90% on 4L (over her baseline of 2L at home), improved to >94% on 6L with improvement of SOB.  C/o new L chest pressure/pain 4/10 (non-radiating), c/f MI or PE, resolved after nitro SL x 1 however HypoT with SBP upper 80s so will avoid further nitro.  HS trop 38 (decreased from prior), BNP 700s (increased from prior).CXR with pulm edema, all most c/w ADHF- Lasix 40 IV x 1 given.    Concern for untreated sepsis with ongoing hypothermia requiring jeffrey hugger despite Vanc/Zosyn for over 24 hours, so broadened to Vanc/Sher/Dinah, consulted ID, repeated BCx x 2. Lactate wnl.  Re: concern for PE, Known LIJ DVT, apixaban x several days then stopped (likely for hemorrhage of vocal fold)- discussed w ENT 4/20, ok to resume anticoag if needed. PE workup- F/u STAT UE and LE Dopplers. Unable to check CTA chest given MARQUEZ, unable to check V/Q given pulm edema. Anticoagulation with Apixaban.  Hypothermia returned however pt looks and feels the best she has in 4 days.  Voice is getting stronger.  Sitting in chair.  UCx resulted with ESBL Klebsiella, so Sher has been good coverage.  ID changed to Dapto (from Vanc), continuing Sher, for coverage of UTI and well as PNA.  BCx have all been negative.  Continuing Eliquis for DVt LUE and presumed PE.  Acute hypox resp failure 2/2 ADHF and presumed PE, still on 6L, dosing Lasix IV PRN daily given yet-unclear status of her sepsis and avoiding volume depletion in sepsis.      Prior to admit, was independent in her residence with her cat.  Two daughters very supportive.

## 2025-04-12 NOTE — NURSING
Notified  of pt is tachy and heart rate is going up to the 130s but is not sustaining. New order of Metoprolol 25 mg ordered.

## 2025-04-12 NOTE — ASSESSMENT & PLAN NOTE
Anemia is likely due to chronic disease due to Chronic Kidney Disease. Most recent hemoglobin and hematocrit are listed below.  Recent Labs     04/10/25  0251 04/11/25  0342   HGB 11.6* 11.3*   HCT 36.6* 36.3*     Plan  - Monitor serial CBC: Daily  - Transfuse PRBC if patient becomes hemodynamically unstable, symptomatic or H/H drops below 7/21.  - Patient has not received any PRBC transfusions to date  - Patient's anemia is currently stable

## 2025-04-12 NOTE — PT/OT/SLP EVAL
Occupational Therapy   Evaluation    Name: Misa Barajas  MRN: [id number]  Admitting Diagnosis: Complete heart block  Recent Surgery: Procedure(s) (LRB):  INSERTION, CARDIAC PACEMAKER, LEADLESS (N/A)  Removal, Pacemaker, Temporary Transvenous 2 Days Post-Op    Recommendations:     Discharge Recommendations: Moderate Intensity Therapy  Discharge Equipment Recommendations:  walker, rolling  Barriers to discharge:  Decreased caregiver support    Assessment:     Misa Barajas is a 79 y.o. female with a medical diagnosis of Complete heart block.  Performance deficits affecting function: impaired endurance, impaired self care skills, impaired functional mobility, gait instability, weakness, impaired balance, impaired cardiopulmonary response to activity, impaired fine motor, edema, decreased coordination, decreased upper extremity function, decreased lower extremity function, pain, impaired coordination.  Pt agreeable to therapy and tolerated well. Upon performing bed mobility and sitting EOB, pt became SOB and reported having difficulty breathing. Pt on 2L O2 vial nasal cannula. Pulse ox reading pt between 83-87% and not resolving after sitting EOB ~ 3 min. Returned pot to bed with pt having BM during mobility, toileting perform and pt left resting in bed with MD present at end of session. OOB mobility deferred  2/2 desats and symptomatic.  Pt remains limited in ADLs, functional mobility, and functional transfers. Patient currently demonstrates a need for moderate intensity therapy on a daily basis post acute secondary to a decline in functional status due to illness    Rehab Prognosis: Good; patient would benefit from acute skilled OT services to address these deficits and reach maximum level of function.       Plan:     Patient to be seen 4 x/week to address the above listed problems via self-care/home management, therapeutic activities, therapeutic exercises, neuromuscular re-education  Plan of Care Expires:  "05/13/25  Plan of Care Reviewed with: patient, daughter    Subjective     Chief Complaint: SOB  Patient/Family Comments/goals: "It just feels hard to breathe"    Occupational Profile:  Living Environment: Pt lives alone [facility] w/ shower chair and grab bars, grab bars by toilet, tub  Previous level of function: Mod (I) using rollator for ADLs and mobility. Not driving. Assit with IADLs from daughter  Roles and Routines: mother  Equipment Used at Home: rollator, bedside commode, oxygen, grab bar  Assistance upon Discharge: daughter lives nearby but can't perform 24/7 assist    Pain/Comfort:  Pain Rating 1: 0/10  Pain Rating Post-Intervention 1: 0/10    Patients cultural, spiritual, Scientologist conflicts given the current situation: no    Objective:     Communicated with: Nurse prior to session.  Patient found HOB elevated with telemetry, pulse ox (continuous), oxygen, bed alarm upon OT entry to room.    General Precautions: Standard, aspiration, dental soft  Orthopedic Precautions:    Braces: N/A  Respiratory Status: Nasal cannula, flow 2 L/min    Occupational Performance:    Bed Mobility:    Patient completed Scooting/Bridging with Mod A to EOB. Total A x 2 to HOB  Patient completed Supine to Sit with moderate assistance  Patient completed Sit to Supine with moderate assistance    Functional Mobility/Transfers:  Functional Mobility: unable to attempt 2/2 pt with sats to low 80s siting EOB with pt not recovering after 3 min resting  Pt sat EOB with CGA, no LOB with good static/dynamic balance    Activities of Daily Living:  Grooming: contact guard assistance pt sat EOB and washed her face   Toileting: total assistance and of 2 persons pt with BM during bed  mobility, 2 person for bed level toielting    Cognitive/Visual Perceptual:  Cognitive/Psychosocial Skills:     -       Oriented to: Person, Place, Time, and Situation   -       Follows Commands/attention:Follows multistep  commands  -       Communication: " clear/fluent  -       Memory: No Deficits noted  -       Safety awareness/insight to disability: intact   -       Mood/Affect/Coping skills/emotional control: Appropriate to situation    Physical Exam:  Balance:    -       CGA sitting EOB   Sensation:    -       Intact  Dominant hand:    -       R hand  Upper Extremity Range of Motion:     -       Right Upper Extremity: WNL  -       Left Upper Extremity: WNL  Upper Extremity Strength:    -       Right Upper Extremity: WFL  -       Left Upper Extremity: WFL   Strength:    -       Right Upper Extremity: WFL  -       Left Upper Extremity: WFL  Fine Motor Coordination:    -       Intact  Left hand thumb/finger opposition skills, Right hand thumb/finger opposition skills, Left hand, manipulation of objects, and Right hand, manipulation of objects    AMPAC 6 Click ADL:  AMPAC Total Score: 11    Treatment & Education:  -Education on energy conservation and task modification to maximize safety and (I) during ADLs and mobility  -Education on importance of OOB activity to improve overall activity tolerance and promote recovery  -Pt educated to call for assistance and to transfer with hospital staff only  -Provided education regarding role of OT, POC, & discharge recommendations with pt and daughter verbalizing understanding.  Pt had no further questions & when asked whether there were any concerns pt reported none.      Patient left HOB elevated with all lines intact, call button in reach, bed alarm on, nurse notified, and daughter present    GOALS:   Multidisciplinary Problems       Occupational Therapy Goals          Problem: Occupational Therapy    Goal Priority Disciplines Outcome Interventions   Occupational Therapy Goal     OT, PT/OT Progressing    Description: Goals to be met by: 5/13/2025     Patient will increase functional independence with ADLs by performing:    UE Dressing with Minimal Assistance.  LE Dressing with Moderate Assistance.  Grooming while standing  at sink with Contact Guard Assistance.  Toileting from bedside commode with Minimal Assistance for hygiene and clothing management.   Step transfer with Moderate Assistance  Toilet transfer to bedside commode with Moderate Assistance.                         DME Justifications:   Misa's mobility limitation cannot be sufficiently resolved by the use of a cane. Her functional mobility deficit can be sufficiently resolved with the use of a Rolling Walker. Patient's mobility limitation significantly impairs their ability to participate in one of more activities of daily living.  The use of a RW will significantly improve the patient's ability to participate in MRADLS and the patient will use it on regular basis in the home.    History:     Past Medical History:   Diagnosis Date    Acute biliary pancreatitis 07/27/2024    Anemia 07/12/2024    Aortic atherosclerosis     Arthritis     knee joint pain    Asthma-COPD overlap syndrome 08/22/2022    home o2    Breast cancer 2002    left breast & lymph nodes-s/p sx with chemo    Cataracts, bilateral     Chronic diastolic heart failure 12/24/2019    Chronic kidney disease, stage 3     Class 1 obesity due to excess calories with serious comorbidity and body mass index (BMI) of 30.0 to 30.9 in adult 05/16/2024    History of chemotherapy     last treatment 12/2002 (had 8 treatments)    Hyperlipidemia 11/20/2016    Hypertension     Lymphedema of both lower extremities 01/25/2022    Nephrolithiasis 06/02/2014    Osteoporosis     Paroxysmal atrial fibrillation 06/07/2021    Renal osteodystrophy 01/14/2016    Shock 4/6/2025    Subclinical hypothyroidism 4/6/2025    Venous stasis dermatitis of both lower extremities 01/25/2022    Vitamin D insufficiency          Past Surgical History:   Procedure Laterality Date    ABLATION, ATRIAL FLUTTER, TYPICAL N/A 6/17/2024    Procedure: Ablation, Atrial Flutter, Typical;  Surgeon: Taz Church MD;  Location: [facility];  Service:  Cardiology;  Laterality: N/A;  AFL, RFA, LORENE, MAKAYLA (cx if SR), anes, MB, 3 Prep    BREAST BIOPSY Left 2002    core bx, +    BREAST BIOPSY Right 2018    core    BREAST BIOPSY Right 2019    BREAST LUMPECTOMY Left 2002    CYSTOSCOPY  4/28/2022    Procedure: CYSTOSCOPY;  Surgeon: Vik Ware MD;  [facility];  Service: Urology;;    CYSTOSCOPY  5/30/2022    Procedure: CYSTOSCOPY;  Surgeon: Bonnie Knutson MD;  [facility];  Service: Urology;;    ECHOCARDIOGRAM,TRANSESOPHAGEAL N/A 6/17/2024    Procedure: Transesophageal echo (MAKAYLA) intra-procedure log documentation;  Surgeon: Nestor Martinez MD;  Location: [facility] EP LAB;  Service: Cardiology;  Laterality: N/A;    ERCP N/A 7/30/2024    Procedure: ERCP (ENDOSCOPIC RETROGRADE CHOLANGIOPANCREATOGRAPHY);  Surgeon: Sierra Cotto MD;  Location: [facility] ENDO (2ND FLR);  Service: Endoscopy;  Laterality: N/A;    IMPLANTATION OF LEADLESS PACEMAKER N/A 4/10/2025    Procedure: INSERTION, CARDIAC PACEMAKER, LEADLESS;  Surgeon: CAROLINE Solis MD;  Location: [facility] EP LAB;  Service: Cardiology;  Laterality: N/A;  CHB, MICRA leadless PPM, MDT, Anes, EH, CICU [id number]    LASER LITHOTRIPSY  5/30/2022    Procedure: LITHOTRIPSY, USING LASER;  Surgeon: Bonnie Knutson MD;  [facility];  Service: Urology;;    LITHOTRIPSY      MASTECTOMY Left 06/2002    left-& lymph node dissection    REMOVAL, PACEMAKER, TEMPORARY TRANSVENOUS  4/10/2025    Procedure: Removal, Pacemaker, Temporary Transvenous;  Surgeon: CAROLINE Solis MD;  Location: [facility] EP LAB;  Service: Cardiology;;    REPLACEMENT OF STENT Right 5/30/2022    Procedure: REPLACEMENT, STENT;  Surgeon: Bonnie Knutson MD;  [facility];  Service: Urology;  Laterality: Right;    RETROGRADE PYELOGRAPHY Right 4/28/2022    Procedure: PYELOGRAM, RETROGRADE;  Surgeon: Vik Ware MD;  Location: [facility] OR [address] [place] [address];  Service: Urology;  Laterality: Right;     ROBOT-ASSISTED LAPAROSCOPIC PARTIAL NEPHRECTOMY USING DA JESÚS XI Right 3/11/2021    Procedure: XI ROBOTIC NEPHRECTOMY, PARTIAL;  Surgeon: Taz Ortega MD;  Location: [facility] OR [address] [place] [address];  Service: Urology;  Laterality: Right;  4hr/ gen with regional  [place] confirmation:  [id number] for 11:15am case NC    URETEROSCOPIC REMOVAL OF URETERIC CALCULUS Right 5/30/2022    Procedure: REMOVAL, CALCULUS, URETER, URETEROSCOPIC;  Surgeon: Bonnie Knutson MD;  Location: [facility] OR [address] [place] [address];  Service: Urology;  Laterality: Right;    ureteroscopy Bilateral     6.14    URETEROSCOPY Right 5/30/2022    Procedure: URETEROSCOPY;  Surgeon: Bonnie Knutson MD;  Location: [facility] OR [address] [place] [address];  Service: Urology;  Laterality: Right;       Time Tracking:     OT Date of Treatment: 04/12/25  OT Start Time: 1049  OT Stop Time: 1113  OT Total Time (min): 24 min    Billable Minutes:Evaluation 12 min  Self Care/Home Management 12 min    4/12/2025

## 2025-04-12 NOTE — SUBJECTIVE & OBJECTIVE
Interval History: NAEON; Patient was working with PT was getting short of breath.  Chest x-ray ordered.  Patient was also noted to be hypertensive.  Added hydralazine 25 mg TID.  ROS  Objective:     Vital Signs (Most Recent):  Temp: 98.6 °F (37 °C) (04/12/25 1203)  Pulse: 85 (04/12/25 1203)  Resp: 18 (04/12/25 1203)  BP: (!) 172/80 (04/12/25 1203)  SpO2: (!) 94 % (04/12/25 1203) Vital Signs (24h Range):  Temp:  [97.5 °F (36.4 °C)-98.6 °F (37 °C)] 98.6 °F (37 °C)  Pulse:  [] 85  Resp:  [16-44] 18  SpO2:  [91 %-99 %] 94 %  BP: (125-172)/(58-84) 172/80     Weight: 73.4 kg (161 lb 13.1 oz) (bed wt)  Body mass index is 27.78 kg/m².     SpO2: (!) 94 %     Intake/Output Summary (Last 24 hours) at 4/12/2025 1316  Last data filed at 4/12/2025 1233  Gross per 24 hour   Intake 397 ml   Output 545 ml   Net -148 ml     Lines/Drains/Airways       Drain  Duration                  Urethral Catheter 04/06/25 0645 6 days              Peripheral Intravenous Line  Duration                  Peripheral IV - Single Lumen 04/10/25 1520 20 G 1 3/4 in Anterior;Right Forearm 1 day         Peripheral IV - Single Lumen 04/11/25 0427 22 G Anterior;Right Hand 1 day                   Physical Exam  Vitals and nursing note reviewed.   Constitutional:       General: She is not in acute distress.     Appearance: She is ill-appearing.   HENT:      Head: Normocephalic and atraumatic.      Right Ear: External ear normal.      Left Ear: External ear normal.      Mouth/Throat:      Mouth: Mucous membranes are moist.      Pharynx: No oropharyngeal exudate.   Eyes:      General: No scleral icterus.     Extraocular Movements: Extraocular movements intact.      Pupils: Pupils are equal, round, and reactive to light.   Cardiovascular:      Rate and Rhythm: Normal rate.      Pulses:           Carotid pulses are 2+ on the right side and 2+ on the left side.       Radial pulses are 2+ on the right side and 2+ on the left side.        Dorsalis pedis pulses  are 1+ on the right side and 1+ on the left side.        Posterior tibial pulses are 1+ on the right side and 1+ on the left side.      Heart sounds: Normal heart sounds. No murmur heard.     Comments: Intermittently paced rhythm  Pulmonary:      Effort: Pulmonary effort is normal. No respiratory distress.      Breath sounds: Normal breath sounds. No wheezing or rales.   Abdominal:      General: Abdomen is flat. Bowel sounds are normal. There is distension.      Palpations: Abdomen is soft.      Tenderness: There is no abdominal tenderness.   Musculoskeletal:         General: No signs of injury. Normal range of motion.      Cervical back: Normal range of motion.      Right lower leg: Edema present.      Left lower leg: Edema present.   Skin:     General: Skin is warm and dry.      Coloration: Skin is pale.   Neurological:      General: No focal deficit present.      Mental Status: She is alert.      Sensory: No sensory deficit.      Motor: No weakness.   Psychiatric:         Mood and Affect: Mood normal.         Behavior: Behavior normal.         Thought Content: Thought content normal.      Significant Labs: All pertinent lab results from the last 24 hours have been reviewed.    Significant Imaging:  reviewed  Review of Systems  Physical Exam  Vitals and nursing note reviewed.   Constitutional:       General: She is not in acute distress.     Appearance: She is ill-appearing.   HENT:      Head: Normocephalic and atraumatic.      Right Ear: External ear normal.      Left Ear: External ear normal.      Mouth/Throat:      Mouth: Mucous membranes are moist.      Pharynx: No oropharyngeal exudate.   Eyes:      General: No scleral icterus.     Extraocular Movements: Extraocular movements intact.      Pupils: Pupils are equal, round, and reactive to light.   Cardiovascular:      Rate and Rhythm: Normal rate.      Pulses:           Carotid pulses are 2+ on the right side and 2+ on the left side.       Radial pulses are 2+  on the right side and 2+ on the left side.        Dorsalis pedis pulses are 1+ on the right side and 1+ on the left side.        Posterior tibial pulses are 1+ on the right side and 1+ on the left side.      Heart sounds: Normal heart sounds. No murmur heard.     Comments: Intermittently paced rhythm  Pulmonary:      Effort: Pulmonary effort is normal. No respiratory distress.      Breath sounds: Normal breath sounds. No wheezing or rales.   Abdominal:      General: Abdomen is flat. Bowel sounds are normal. There is distension.      Palpations: Abdomen is soft.      Tenderness: There is no abdominal tenderness.   Musculoskeletal:         General: No signs of injury. Normal range of motion.      Cervical back: Normal range of motion.      Right lower leg: Edema present.      Left lower leg: Edema present.   Skin:     General: Skin is warm and dry.      Coloration: Skin is pale.   Neurological:      General: No focal deficit present.      Mental Status: She is alert.      Sensory: No sensory deficit.      Motor: No weakness.   Psychiatric:         Mood and Affect: Mood normal.         Behavior: Behavior normal.         Thought Content: Thought content normal.

## 2025-04-12 NOTE — PROGRESS NOTES
[facility] - Cardiology [facility] Medicine  Progress Note    Patient Name: Misa Barajas  MRN: [id number]  Patient Class: IP- Inpatient   Admission Date: 4/6/2025  Length of Stay: 6 days  Attending Physician: Dominga Singer MD  Primary Care Provider: Isela Langston MD        Subjective     Principal Problem:Complete heart block        HPI:  Ms. Misa Barajas is a 78 yo female with COPD on 2L home O2, HFpEF, AFL s/p RFA (6/17/24 w/ Dr. Church), CKD III, HTN, HLD, chronic venous insufficiency, orthostatic hypotension who was brought via EMS after per history she was found confused and bradycardiac with HR in the 20s.They started epi and transcutaneous pacing. Given Versed for the transcutaneous pacing. They had to bag her in route. We do not have strip of underlying rhythm. Patient obtunded for which she was intubated for airway protection on arrival to the ED and transcutaneous pacing was exchanged to transvenous pacing via RIJ (threshold 0.6). Underlying rate 44 bifascicular block. Known RBBB. Per daughters patient had called them over the phone when they noticed she appeared confused. Due to concerns for a possible syncopal episode EMS was called. Prior to event patient had been complaining of malaise and nausea for which she took about two Zofran pills per the daughters.      She had been recently seen on 3/27/25 at the [facility] visit following recent admission for AHRF 2/2 ADHF and COPD exacerbation. On ED presentation BNP elevated to 2500, troponin elevated to 19, CXR with pulmonary edema. She was started on steroids, nebs and antibiotics in addition to IV diuresis. Updated TTE stable from prior, showing HFpEF with elevated CVP 15. Her O2 requirements improved to baseline with diuresis and she was subsequently discharged. At that visit patient had not required midodrine as blood pressure had remained stable and she was asymptomatic.      Off note, patient was taken off OAC due to bleeding  issues and no recurrent of typical atrial flutter after ablation.     Overview/Hospital Course:  Admitted to CCU for CHB in s/o hyperkalemia & in the presence of conduction disease/RBBB & LAHB - stable w/ temporary wire. Septic shock d/t UTI; vasopressors weaned off. HTN; started NG gtt. Acute on chronic hypoxic/hypercapnic RF & obtunded; intubated. Successfully extubated 04/08. MARQUEZ on CKD; improved w/ good UOP response to diuresis. PVCs w/ intermittent pacing by TVP. Switched pip-tazo to amox-clav on 04/09; continued to improve; empiric EED 04/13 for 7 day course. Micra PM by EP placed 04/10. Medically stable for stepdown for placement (very weak).     Interval History: NAEON; Patient was working with PT was getting short of breath.  Chest x-ray ordered.  Patient was also noted to be hypertensive.  Added hydralazine 25 mg TID.  ROS  Objective:     Vital Signs (Most Recent):  Temp: 98.6 °F (37 °C) (04/12/25 1203)  Pulse: 85 (04/12/25 1203)  Resp: 18 (04/12/25 1203)  BP: (!) 172/80 (04/12/25 1203)  SpO2: (!) 94 % (04/12/25 1203) Vital Signs (24h Range):  Temp:  [97.5 °F (36.4 °C)-98.6 °F (37 °C)] 98.6 °F (37 °C)  Pulse:  [] 85  Resp:  [16-44] 18  SpO2:  [91 %-99 %] 94 %  BP: (125-172)/(58-84) 172/80     Weight: 73.4 kg (161 lb 13.1 oz) (bed wt)  Body mass index is 27.78 kg/m².     SpO2: (!) 94 %     Intake/Output Summary (Last 24 hours) at 4/12/2025 1316  Last data filed at 4/12/2025 1233  Gross per 24 hour   Intake 397 ml   Output 545 ml   Net -148 ml     Lines/Drains/Airways       Drain  Duration                  Urethral Catheter 04/06/25 0645 6 days              Peripheral Intravenous Line  Duration                  Peripheral IV - Single Lumen 04/10/25 1520 20 G 1 3/4 in Anterior;Right Forearm 1 day         Peripheral IV - Single Lumen 04/11/25 0427 22 G Anterior;Right Hand 1 day                   Physical Exam  Vitals and nursing note reviewed.   Constitutional:       General: She is not in acute  distress.     Appearance: She is ill-appearing.   HENT:      Head: Normocephalic and atraumatic.      Right Ear: External ear normal.      Left Ear: External ear normal.      Mouth/Throat:      Mouth: Mucous membranes are moist.      Pharynx: No oropharyngeal exudate.   Eyes:      General: No scleral icterus.     Extraocular Movements: Extraocular movements intact.      Pupils: Pupils are equal, round, and reactive to light.   Cardiovascular:      Rate and Rhythm: Normal rate.      Pulses:           Carotid pulses are 2+ on the right side and 2+ on the left side.       Radial pulses are 2+ on the right side and 2+ on the left side.        Dorsalis pedis pulses are 1+ on the right side and 1+ on the left side.        Posterior tibial pulses are 1+ on the right side and 1+ on the left side.      Heart sounds: Normal heart sounds. No murmur heard.     Comments: Intermittently paced rhythm  Pulmonary:      Effort: Pulmonary effort is normal. No respiratory distress.      Breath sounds: Normal breath sounds. No wheezing or rales.   Abdominal:      General: Abdomen is flat. Bowel sounds are normal. There is distension.      Palpations: Abdomen is soft.      Tenderness: There is no abdominal tenderness.   Musculoskeletal:         General: No signs of injury. Normal range of motion.      Cervical back: Normal range of motion.      Right lower leg: Edema present.      Left lower leg: Edema present.   Skin:     General: Skin is warm and dry.      Coloration: Skin is pale.   Neurological:      General: No focal deficit present.      Mental Status: She is alert.      Sensory: No sensory deficit.      Motor: No weakness.   Psychiatric:         Mood and Affect: Mood normal.         Behavior: Behavior normal.         Thought Content: Thought content normal.      Significant Labs: All pertinent lab results from the last 24 hours have been reviewed.    Significant Imaging:  reviewed  Review of Systems  Physical Exam  Vitals and  nursing note reviewed.   Constitutional:       General: She is not in acute distress.     Appearance: She is ill-appearing.   HENT:      Head: Normocephalic and atraumatic.      Right Ear: External ear normal.      Left Ear: External ear normal.      Mouth/Throat:      Mouth: Mucous membranes are moist.      Pharynx: No oropharyngeal exudate.   Eyes:      General: No scleral icterus.     Extraocular Movements: Extraocular movements intact.      Pupils: Pupils are equal, round, and reactive to light.   Cardiovascular:      Rate and Rhythm: Normal rate.      Pulses:           Carotid pulses are 2+ on the right side and 2+ on the left side.       Radial pulses are 2+ on the right side and 2+ on the left side.        Dorsalis pedis pulses are 1+ on the right side and 1+ on the left side.        Posterior tibial pulses are 1+ on the right side and 1+ on the left side.      Heart sounds: Normal heart sounds. No murmur heard.     Comments: Intermittently paced rhythm  Pulmonary:      Effort: Pulmonary effort is normal. No respiratory distress.      Breath sounds: Normal breath sounds. No wheezing or rales.   Abdominal:      General: Abdomen is flat. Bowel sounds are normal. There is distension.      Palpations: Abdomen is soft.      Tenderness: There is no abdominal tenderness.   Musculoskeletal:         General: No signs of injury. Normal range of motion.      Cervical back: Normal range of motion.      Right lower leg: Edema present.      Left lower leg: Edema present.   Skin:     General: Skin is warm and dry.      Coloration: Skin is pale.   Neurological:      General: No focal deficit present.      Mental Status: She is alert.      Sensory: No sensory deficit.      Motor: No weakness.   Psychiatric:         Mood and Affect: Mood normal.         Behavior: Behavior normal.         Thought Content: Thought content normal.           Assessment & Plan  Complete heart block      RBBB  Chronic     CKD (chronic kidney  "disease) stage 3, GFR 30-59 ml/min  Creatine stable for now. BMP reviewed- noted Estimated Creatinine Clearance: 28.9 mL/min (A) (based on SCr of 1.5 mg/dL (H)). according to latest data. Based on current GFR, CKD stage is stage 4 - GFR 15-29.  Monitor UOP and serial BMP and adjust therapy as needed. Renally dose meds. Avoid nephrotoxic medications and procedures.    Centrilobular emphysema  Prior history of smoking. Continue scheduled inhalers once extubated. Antibiotics and Supplemental oxygen and monitor respiratory status closely.   Chronic respiratory failure with hypoxia  Patient with Hypoxic Respiratory failure which is Chronic.  she is on home oxygen at 2 LPM. Supplemental oxygen was provided and noted-       Signs/symptoms of respiratory failure include- tachypnea, increased work of breathing, use of accessory muscles, and lethargy. Contributing diagnoses includes - underlying COPD   MARQUEZ (acute kidney injury)  MARQUEZ is likely due to  MARQUEZ on CKD . Baseline creatinine is  1.1 . Most recent creatinine and eGFR are listed below.  Recent Labs     04/10/25  0809 04/11/25  0342 04/11/25  1725   CREATININE 1.6* 1.4 1.5*   EGFRNORACEVR 32* 38* 35*      Plan  - MARQUEZ is stable  - Avoid nephrotoxins and renally dose meds for GFR listed above  - Monitor urine output, serial BMP, and adjust therapy as needed    Acute on chronic congestive heart failure  Most recent BNP and echo results are listed below.  No results for input(s): "BNP" in the last 72 hours.     Echo  Result Date: 4/7/2025    Left Ventricle: The left ventricle is normal in size. Normal wall   thickness. There is low normal systolic function with a visually estimated   ejection fraction of 50 - 55%. Grade II diastolic dysfunction.    Right Ventricle: The right ventricle is normal in size. Wall thickness   is normal. Systolic function is normal.    IVC/SVC: Patient is ventilated, cannot use IVC diameter to estimate   right atrial pressure.            Current " Heart Failure Medications: have been held due to bradycardia and MARQUEZ     Plan  - Monitor strict I&Os and daily weights.    - Place on telemetry    Orthostatic hypotension    holding midodrine and per history patient had not required it since last hospitalization   Iron deficiency anemia due to chronic blood loss  Anemia is likely due to chronic disease due to Chronic Kidney Disease. Most recent hemoglobin and hematocrit are listed below.  Recent Labs     04/10/25  0251 04/11/25  0342   HGB 11.6* 11.3*   HCT 36.6* 36.3*     Plan  - Monitor serial CBC: Daily  - Transfuse PRBC if patient becomes hemodynamically unstable, symptomatic or H/H drops below 7/21.  - Patient has not received any PRBC transfusions to date  - Patient's anemia is currently stable    Shock  Ms. Misa Barajas is a 78 yo female with COPD on 2L home O2, HFpEF, AFL s/p RFA (6/17/24 w/ Dr. Church), CKD III, HTN, HLD, chronic venous insufficiency, orthostatic hypotension who was brought via EMS after per history she was found confused and bradycardiac with HR in the 20s.They started epi and transcutaneous pacing. They had to bag her in route. We do not have strip of underlying rhythm. Patient obtunded for which she was intubated on arrival to the ED and transcutaneous pacing was exchanged to transvenous pacing via RIJ. Underlying rate 44 bifascicular block. Known RBBB.     -Unclear etiology ddx include sepsis, mesenteric ischemia?  - elevated lactic acid. Awaiting CT h/c/a/p; resolved  - bradycardia most likely in the setting of electrolyte disturbance and abnormal thyroid function  - correcting electrolyte disturbance. Suspect most likely she will then not require tvp.   - obtaining bcx and ucx. Broad spectrum abx ppx and deescalating as needed.   No cx growth, empiric tx for urosepsis w/ pip-tazo (start 04/06)  Complete amox-clav (EED 04/13) for 7 day course  - orosco placement  EP following  S/p Micra PM on 04/10  Subclinical  hypothyroidism      VTE Risk Mitigation (From admission, onward)           Ordered     apixaban tablet 5 mg  2 times daily         04/11/25 1201     heparin (porcine) 2,000 Units in 0.9% NaCl 1,000 mL  As needed (PRN)         04/10/25 1256     heparin infusion 1,000 units/500 ml in 0.9% NaCl (on sterile field)  As needed (PRN)         04/10/25 1256     IP VTE HIGH RISK PATIENT  Once         04/06/25 0512     Place sequential compression device  Until discontinued         04/06/25 0454                    Discharge Planning   NOHEMI: 4/15/2025     Code Status: Full Code   Medical Readiness for Discharge Date:   Discharge Plan A: Home Health                Please place Justification for DME        Dominga Singer MD  Department of Hospital Medicine   [facility] - Cardiology Stepdown

## 2025-04-12 NOTE — ASSESSMENT & PLAN NOTE
MARQUEZ is likely due to MARQUEZ on CKD. Baseline creatinine is 1.1. Most recent creatinine and eGFR are listed below.  Recent Labs     04/10/25  0809 04/11/25  0342 04/11/25  1725   CREATININE 1.6* 1.4 1.5*   EGFRNORACEVR 32* 38* 35*      Plan  - MARQUEZ is stable  - Avoid nephrotoxins and renally dose meds for GFR listed above  - Monitor urine output, serial BMP, and adjust therapy as needed

## 2025-04-12 NOTE — PROGRESS NOTES
04/12/25 0748 04/12/25 0915   Vital Signs   Pulse 94 84   Heart Rate Source Monitor Monitor   Resp 18  --    SpO2 (!) 91 %  --    BP (!) 172/84 (!) 167/74   MAP (mmHg) 120 104   BP Location Right arm Right arm   BP Method  --  Automatic   Patient Position Lying Lying     Pt's BP improved to 167/74 after metoprolol 25mg PO. Dominga Singer MD aware.

## 2025-04-12 NOTE — ASSESSMENT & PLAN NOTE
Creatine stable for now. BMP reviewed- noted Estimated Creatinine Clearance: 28.9 mL/min (A) (based on SCr of 1.5 mg/dL (H)). according to latest data. Based on current GFR, CKD stage is stage 4 - GFR 15-29.  Monitor UOP and serial BMP and adjust therapy as needed. Renally dose meds. Avoid nephrotoxic medications and procedures.

## 2025-04-13 NOTE — PLAN OF CARE
Problem: Skin Injury Risk Increased  Goal: Skin Health and Integrity  Outcome: Progressing     Problem: Adult Inpatient Plan of Care  Goal: Plan of Care Review  Outcome: Progressing  Goal: Patient-Specific Goal (Individualized)  Outcome: Progressing  Goal: Absence of Hospital-Acquired Illness or Injury  Outcome: Progressing  Goal: Optimal Comfort and Wellbeing  Outcome: Progressing  Goal: Readiness for Transition of Care  Outcome: Progressing     Problem: Fall Injury Risk  Goal: Absence of Fall and Fall-Related Injury  Outcome: Progressing     Problem: Infection  Goal: Absence of Infection Signs and Symptoms  Outcome: Progressing     Problem: Wound  Goal: Optimal Coping  Outcome: Progressing  Goal: Optimal Functional Ability  Outcome: Progressing  Goal: Absence of Infection Signs and Symptoms  Outcome: Progressing  Goal: Improved Oral Intake  Outcome: Progressing  Goal: Optimal Pain Control and Function  Outcome: Progressing  Goal: Skin Health and Integrity  Outcome: Progressing  Goal: Optimal Wound Healing  Outcome: Progressing     Problem: Acute Kidney Injury/Impairment  Goal: Fluid and Electrolyte Balance  Outcome: Progressing  Goal: Improved Oral Intake  Outcome: Progressing  Goal: Effective Renal Function  Outcome: Progressing     Problem: Delirium  Goal: Optimal Coping  Outcome: Progressing  Goal: Improved Behavioral Control  Outcome: Progressing  Goal: Improved Attention and Thought Clarity  Outcome: Progressing  Goal: Improved Sleep  Outcome: Progressing

## 2025-04-13 NOTE — ASSESSMENT & PLAN NOTE
Anemia is likely due to chronic disease due to Chronic Kidney Disease. Most recent hemoglobin and hematocrit are listed below.  Recent Labs     04/11/25  0342 04/12/25  1801 04/13/25  0901   HGB 11.3* 11.8* 11.4*   HCT 36.3* 36.5* 35.8*     Plan  - Monitor serial CBC: Daily  - Transfuse PRBC if patient becomes hemodynamically unstable, symptomatic or H/H drops below 7/21.  - Patient has not received any PRBC transfusions to date  - Patient's anemia is currently stable

## 2025-04-13 NOTE — ASSESSMENT & PLAN NOTE
MARQUEZ is likely due to MARQUEZ on CKD. Baseline creatinine is 1.1. Most recent creatinine and eGFR are listed below.  Recent Labs     04/11/25  1725 04/12/25  1801 04/13/25  0901   CREATININE 1.5* 1.3 1.4   EGFRNORACEVR 35* 41* 38*      Plan  - MARQUEZ is stable  - Avoid nephrotoxins and renally dose meds for GFR listed above  - Monitor urine output, serial BMP, and adjust therapy as needed

## 2025-04-13 NOTE — PLAN OF CARE
Problem: Physical Therapy  Goal: Physical Therapy Goal  Description: PT goals until 4/24/25    1. Pt supine to sit with CGA-not met  2. Pt sit to supine with CGA-not met  3. Pt sit to stand with RW with CGA-not met  4. Pt to perform gait 10ft with RW with CGA.-not met  5. Pt to transfer bed to/from bedside chair with HHA with minimal assist.-not met  6. Pt to up/down 1 step with RW with minimal assist.-not met  7. Pt to perform B LE exs in sitting or supine x 10 reps to strengthen B LE to improve functional mobility.-not met    DME Justifications (see above for complete DME recommendations)    Bedside Commode- Patient has a mobility limitation that significantly impairs their ability to participate in one or more mobility related activities of daily living, including toileting. This deficit can be resolved by using a bedside commode. Patient demonstrates mobility limitations that will cause them to be confined to one room at home without bathroom access for up to 30 days. Using a bedside commode will greatly improve the patient's ability to participate in MRADLs.     Rolling Walker- Patient demonstrates a mobility limitation that significantly impairs their ability to participate in one or more mobility related activities of daily living. Patient's mobility limitation cannot be sufficiently resolved with the use of a cane, but can be sufficiently resolved with the use of a rolling walker.The use of a rolling walker will considerably improve their ability to participate in MRADLs. Patient will use the walker on a regular basis at home.    Outcome: Progressing   Pt's goals set and pt will benefit from skilled PT services to work towards improved functional mobility including: bed mobility, transfers,up/down step, and gait. Karey Manzanares PT  4/13/2025

## 2025-04-13 NOTE — PT/OT/SLP EVAL
"Physical Therapy Evaluation    Patient Name:  Misa Barajas   MRN:  [id number]    Recommendations:     Discharge Recommendations: Moderate Intensity Therapy   Discharge Equipment Recommendations: walker, rolling, bedside commode   Barriers to discharge: Inaccessible home and Decreased caregiver support lives alone with 1 ALKA    Assessment:     Misa Barajas is a 79 y.o. female admitted with a medical diagnosis of Complete heart block.  She presents with the following impairments/functional limitations: weakness, impaired self care skills, impaired functional mobility, impaired sensation, gait instability, impaired endurance, decreased lower extremity function, impaired cardiopulmonary response to activity . Pt is unsafe with functional mobility at this time due to pt requires moderate assist for bed mobility, moderate assist for transfers, and minimal assist for sitting balance due to lethargy, weakness, and SOB. Pt attempted to side step in standing without success, unable to clear her foot to step. Pt is motivated to progress with functional mobility.     Rehab Prognosis: Good; patient would benefit from acute skilled PT services to address these deficits and reach maximum level of function.    Recent Surgery: Procedure(s) (LRB):  INSERTION, CARDIAC PACEMAKER, LEADLESS (N/A)  Removal, Pacemaker, Temporary Transvenous 3 Days Post-Op    Plan:     During this hospitalization, patient to be seen 4 x/week to address the identified rehab impairments via gait training, therapeutic activities, therapeutic exercises, neuromuscular re-education and progress toward the following goals:    Plan of Care Expires:  05/13/25    Subjective   "I am tired"    Patient/Family Comments/goals: "This will be the first time she has gotten up since she has been her, that I know of"  Pain/Comfort:  Pain Rating 1: 0/10  Pain Rating Post-Intervention 1: 0/10    Patients cultural, spiritual, Nondenominational conflicts given the current situation: " "no    Living Environment:  Pt lives alone in a 1 story home with 1 ALKA.  Prior to admission, patients level of function was independent.  Equipment used at home: bedside commode, rollator, oxygen, grab bar.  DME owned (not currently used):  rollator .  Upon discharge, patient will have assistance from daughters when available.    Objective:     Communicated with nurse prior to session.  Patient found HOB elevated with bed alarm, oxygen, pulse ox (continuous), telemetry  upon PT entry to room.    General Precautions: Standard, aspiration, dental soft  Orthopedic Precautions:N/A   Braces: N/A  Respiratory Status: Nasal cannula, flow 2 L/min    Exams:  Cognitive Exam:  Patient is oriented to Person and Place  Sensation:    -       Intact  light/touch B LE- appears intact but testing unreliable due to pt falling asleep and inconsistent with responses  RLE ROM: WFL  RLE Strength: Deficits: hip flex 3-/5; knee flex/ext 4/5; ankle DF 3-/5  LLE ROM: WFL  LLE Strength: Deficits: hip flex 3-/5; knee flex/ext 4/5; ankle DF 4/5    Functional Mobility:  Bed Mobility:     Rolling Right: moderate assistance  Supine to Sit: moderate assistance  Sit to Supine: moderate assistance  Transfers:     Sit to Stand:  minimum assistance 1st and 2nd time and moderate assistance 3rd time with rolling walker  Gait: Pt attempted to step to the R with RW without success, pt unable to clear her foot to step.    AM-PAC 6 CLICK MOBILITY  Total Score:9     Treatment & Education:  Pt's daughter present throughout session. Pt sat on the EOB with CGA ~ 18  min between standing trials. Pt noted to have BM on the bed linens upon standing. Pt stood 2 additional time for ~ 1 min each trial with RW support with moderate assist to be cleaned and linen replaced. Pt noted to have difficulty breathing "Lift my head, I can't breath" when her head was flat on the bed. It improved with elevation of the HOB. Nurse notified. Pt's IV site on her R arm bleeding in " standing, nurse notified and PT assisted pt with changing her gown and washing her hand as needed.    Patient left HOB elevated with all lines intact, call button in reach, nurse notified, and daughter present.    GOALS:   Multidisciplinary Problems       Physical Therapy Goals          Problem: Physical Therapy    Goal Priority Disciplines Outcome Interventions   Physical Therapy Goal     PT, PT/OT Progressing    Description: PT goals until 4/24/25    1. Pt supine to sit with CGA-not met  2. Pt sit to supine with CGA-not met  3. Pt sit to stand with RW with CGA-not met  4. Pt to perform gait 10ft with RW with CGA.-not met  5. Pt to transfer bed to/from bedside chair with HHA with minimal assist.-not met  6. Pt to up/down 1 step with RW with minimal assist.-not met  7. Pt to perform B LE exs in sitting or supine x 10 reps to strengthen B LE to improve functional mobility.-not met    DME Justifications (see above for complete DME recommendations)    Bedside Commode- Patient has a mobility limitation that significantly impairs their ability to participate in one or more mobility related activities of daily living, including toileting. This deficit can be resolved by using a bedside commode. Patient demonstrates mobility limitations that will cause them to be confined to one room at home without bathroom access for up to 30 days. Using a bedside commode will greatly improve the patient's ability to participate in MRADLs.     Rolling Walker- Patient demonstrates a mobility limitation that significantly impairs their ability to participate in one or more mobility related activities of daily living. Patient's mobility limitation cannot be sufficiently resolved with the use of a cane, but can be sufficiently resolved with the use of a rolling walker.The use of a rolling walker will considerably improve their ability to participate in MRADLs. Patient will use the walker on a regular basis at home.                               DME Justifications:  Misa Barajas has a mobility limitation that significantly impairs her ability to participate in one or more mobility related activities of daily living (MRADLs) such as toileting, feeding, dressing, grooming, and bathing in customary locations in the home.  The mobility limitation cannot be sufficiently resolved by the use of a cane or walker.   Patients upper body strength is not sufficient to propel a standard WC. The use of a lightweight wheelchair will significantly improve the patients ability to participate in MRADLS and the patient will use it on regular basis in the home.   She has a caregiver who is available, willing, and able to provide assistance with the wheelchair when needed.     History:     Past Medical History:   Diagnosis Date    Acute biliary pancreatitis 07/27/2024    Anemia 07/12/2024    Aortic atherosclerosis     Arthritis     knee joint pain    Asthma-COPD overlap syndrome 08/22/2022    home o2    Breast cancer 2002    left breast & lymph nodes-s/p sx with chemo    Cataracts, bilateral     Chronic diastolic heart failure 12/24/2019    Chronic kidney disease, stage 3     Class 1 obesity due to excess calories with serious comorbidity and body mass index (BMI) of 30.0 to 30.9 in adult 05/16/2024    History of chemotherapy     last treatment 12/2002 (had 8 treatments)    Hyperlipidemia 11/20/2016    Hypertension     Lymphedema of both lower extremities 01/25/2022    Nephrolithiasis 06/02/2014    Osteoporosis     Paroxysmal atrial fibrillation 06/07/2021    Renal osteodystrophy 01/14/2016    Shock 4/6/2025    Subclinical hypothyroidism 4/6/2025    Venous stasis dermatitis of both lower extremities 01/25/2022    Vitamin D insufficiency        Past Surgical History:   Procedure Laterality Date    ABLATION, ATRIAL FLUTTER, TYPICAL N/A 6/17/2024    Procedure: Ablation, Atrial Flutter, Typical;  Surgeon: Taz Church MD;  Location: [facility];  Service:  Cardiology;  Laterality: N/A;  AFL, RFA, LORENE, MAKAYLA (cx if SR), anes, MB, 3 Prep    BREAST BIOPSY Left 2002    core bx, +    BREAST BIOPSY Right 2018    core    BREAST BIOPSY Right 2019    BREAST LUMPECTOMY Left 2002    CYSTOSCOPY  4/28/2022    Procedure: CYSTOSCOPY;  Surgeon: Vik Ware MD;  [facility];  Service: Urology;;    CYSTOSCOPY  5/30/2022    Procedure: CYSTOSCOPY;  Surgeon: Bonnie Knutson MD;  [facility];  Service: Urology;;    ECHOCARDIOGRAM,TRANSESOPHAGEAL N/A 6/17/2024    Procedure: Transesophageal echo (MAKAYLA) intra-procedure log documentation;  Surgeon: Nestor Martinez MD;  Location: [facility] EP LAB;  Service: Cardiology;  Laterality: N/A;    ERCP N/A 7/30/2024    Procedure: ERCP (ENDOSCOPIC RETROGRADE CHOLANGIOPANCREATOGRAPHY);  Surgeon: Sierra Cotto MD;  Location: [facility] ENDO (2ND FLR);  Service: Endoscopy;  Laterality: N/A;    IMPLANTATION OF LEADLESS PACEMAKER N/A 4/10/2025    Procedure: INSERTION, CARDIAC PACEMAKER, LEADLESS;  Surgeon: CAROLINE Solis MD;  Location: [facility] EP LAB;  Service: Cardiology;  Laterality: N/A;  CHB, MICRA leadless PPM, MDT, Anes, EH, CICU 3073    LASER LITHOTRIPSY  5/30/2022    Procedure: LITHOTRIPSY, USING LASER;  Surgeon: Bonnie Knutson MD;  [facility];  Service: Urology;;    LITHOTRIPSY      MASTECTOMY Left 06/2002    left-& lymph node dissection    REMOVAL, PACEMAKER, TEMPORARY TRANSVENOUS  4/10/2025    Procedure: Removal, Pacemaker, Temporary Transvenous;  Surgeon: CAROLINE Solis MD;  Location: [facility] EP LAB;  Service: Cardiology;;    REPLACEMENT OF STENT Right 5/30/2022    Procedure: REPLACEMENT, STENT;  Surgeon: Bonnie Knutson MD;  [facility];  Service: Urology;  Laterality: Right;    RETROGRADE PYELOGRAPHY Right 4/28/2022    Procedure: PYELOGRAM, RETROGRADE;  Surgeon: Vik Ware MD;  Location: [facility] OR [address] [place] [address];  Service: Urology;  Laterality: Right;     ROBOT-ASSISTED LAPAROSCOPIC PARTIAL NEPHRECTOMY USING DA JESÚS XI Right 3/11/2021    Procedure: XI ROBOTIC NEPHRECTOMY, PARTIAL;  Surgeon: Taz Ortega MD;  Location: [facility] OR [address] [place] [address];  Service: Urology;  Laterality: Right;  4hr/ gen with regional  [place] confirmation:  [id number] for 11:15am case NC    URETEROSCOPIC REMOVAL OF URETERIC CALCULUS Right 5/30/2022    Procedure: REMOVAL, CALCULUS, URETER, URETEROSCOPIC;  Surgeon: Bonnie Knutson MD;  Location: [facility] OR [address] [place] [address];  Service: Urology;  Laterality: Right;    ureteroscopy Bilateral     6.14    URETEROSCOPY Right 5/30/2022    Procedure: URETEROSCOPY;  Surgeon: Bonnie Knutson MD;  Location: [facility] OR [address] [place] [address];  Service: Urology;  Laterality: Right;       Time Tracking:     PT Received On: 04/13/25  PT Start Time: 1009     PT Stop Time: 1032  PT Total Time (min): 23 min     Billable Minutes: Evaluation 13 and Therapeutic Activity 10      04/13/2025

## 2025-04-13 NOTE — NURSING
"Notified  of pt itching to both arms, Right are is still weeping. New order placed prn .      Pt was not able to get SVO2 this morning, No [facility] line at this time.  stated "I will ask the day time attending provider to look into the orders that are still present for SVO2."   "

## 2025-04-13 NOTE — SUBJECTIVE & OBJECTIVE
Interval History: NAEON; patient complaining of productive cough, ordered  dextromethorphan.  Urine output in last 24 hours was 1.2 L. administer 1 more dose of 80 mg IV Lasix.      ROS  Objective:     Vital Signs (Most Recent):  Temp: 97.9 °F (36.6 °C) (04/13/25 0429)  Pulse: 88 (04/13/25 0659)  Resp: 18 (04/13/25 0429)  BP: (!) 151/72 (04/13/25 0429)  SpO2: 95 % (04/13/25 0429) Vital Signs (24h Range):  Temp:  [97.5 °F (36.4 °C)-98.6 °F (37 °C)] 97.9 °F (36.6 °C)  Pulse:  [] 88  Resp:  [16-19] 18  SpO2:  [92 %-96 %] 95 %  BP: (122-173)/(59-80) 151/72     Weight: 73.4 kg (161 lb 13.1 oz) (bed wt)  Body mass index is 27.78 kg/m².     SpO2: 95 %     Intake/Output Summary (Last 24 hours) at 4/13/2025 0930  Last data filed at 4/13/2025 0605  Gross per 24 hour   Intake 564 ml   Output 1200 ml   Net -636 ml     Lines/Drains/Airways       Drain  Duration                  Urethral Catheter 04/06/25 0645 7 days              Peripheral Intravenous Line  Duration                  Peripheral IV - Single Lumen 04/10/25 1520 20 G 1 3/4 in Anterior;Right Forearm 2 days         Peripheral IV - Single Lumen 04/11/25 0427 22 G Anterior;Right Hand 2 days                   Physical Exam  Vitals and nursing note reviewed.   Constitutional:       General: She is not in acute distress.     Appearance: She is ill-appearing.   HENT:      Head: Normocephalic and atraumatic.      Right Ear: External ear normal.      Left Ear: External ear normal.      Mouth/Throat:      Mouth: Mucous membranes are moist.      Pharynx: No oropharyngeal exudate.   Eyes:      General: No scleral icterus.     Extraocular Movements: Extraocular movements intact.      Pupils: Pupils are equal, round, and reactive to light.   Cardiovascular:      Rate and Rhythm: Normal rate.      Pulses:           Carotid pulses are 2+ on the right side and 2+ on the left side.       Radial pulses are 2+ on the right side and 2+ on the left side.        Dorsalis pedis pulses  are 1+ on the right side and 1+ on the left side.        Posterior tibial pulses are 1+ on the right side and 1+ on the left side.      Heart sounds: Normal heart sounds. No murmur heard.     Comments: Intermittently paced rhythm  Pulmonary:      Effort: Pulmonary effort is normal. No respiratory distress.      Breath sounds: Normal breath sounds. No wheezing or rales.   Abdominal:      General: Abdomen is flat. Bowel sounds are normal. There is distension.      Palpations: Abdomen is soft.      Tenderness: There is no abdominal tenderness.   Musculoskeletal:         General: No signs of injury. Normal range of motion.      Cervical back: Normal range of motion.      Right lower leg: Edema present.      Left lower leg: Edema present.   Skin:     General: Skin is warm and dry.      Coloration: Skin is pale.   Neurological:      General: No focal deficit present.      Mental Status: She is alert.      Sensory: No sensory deficit.      Motor: No weakness.   Psychiatric:         Mood and Affect: Mood normal.         Behavior: Behavior normal.         Thought Content: Thought content normal.      Significant Labs: All pertinent lab results from the last 24 hours have been reviewed.    Significant Imaging:  reviewed  Review of Systems  Physical Exam  Vitals and nursing note reviewed.   Constitutional:       General: She is not in acute distress.     Appearance: She is ill-appearing.   HENT:      Head: Normocephalic and atraumatic.      Right Ear: External ear normal.      Left Ear: External ear normal.      Mouth/Throat:      Mouth: Mucous membranes are moist.      Pharynx: No oropharyngeal exudate.   Eyes:      General: No scleral icterus.     Extraocular Movements: Extraocular movements intact.      Pupils: Pupils are equal, round, and reactive to light.   Cardiovascular:      Rate and Rhythm: Normal rate.      Pulses:           Carotid pulses are 2+ on the right side and 2+ on the left side.       Radial pulses are 2+  on the right side and 2+ on the left side.        Dorsalis pedis pulses are 1+ on the right side and 1+ on the left side.        Posterior tibial pulses are 1+ on the right side and 1+ on the left side.      Heart sounds: Normal heart sounds. No murmur heard.     Comments: Intermittently paced rhythm  Pulmonary:      Effort: Pulmonary effort is normal. No respiratory distress.      Breath sounds: Normal breath sounds. No wheezing or rales.   Abdominal:      General: Abdomen is flat. Bowel sounds are normal. There is distension.      Palpations: Abdomen is soft.      Tenderness: There is no abdominal tenderness.   Musculoskeletal:         General: No signs of injury. Normal range of motion.      Cervical back: Normal range of motion.      Right lower leg: Edema present.      Left lower leg: Edema present.   Skin:     General: Skin is warm and dry.      Coloration: Skin is pale.   Neurological:      General: No focal deficit present.      Mental Status: She is alert.      Sensory: No sensory deficit.      Motor: No weakness.   Psychiatric:         Mood and Affect: Mood normal.         Behavior: Behavior normal.         Thought Content: Thought content normal.

## 2025-04-13 NOTE — PROGRESS NOTES
[facility] - Cardiology [facility] Medicine  Progress Note    Patient Name: Misa Barajas  MRN: [id number]  Patient Class: IP- Inpatient   Admission Date: 4/6/2025  Length of Stay: 7 days  Attending Physician: Dominga Singer MD  Primary Care Provider: Isela Langston MD        Subjective     Principal Problem:Complete heart block        HPI:  Ms. Misa Barajas is a 80 yo female with COPD on 2L home O2, HFpEF, AFL s/p RFA (6/17/24 w/ Dr. Church), CKD III, HTN, HLD, chronic venous insufficiency, orthostatic hypotension who was brought via EMS after per history she was found confused and bradycardiac with HR in the 20s.They started epi and transcutaneous pacing. Given Versed for the transcutaneous pacing. They had to bag her in route. We do not have strip of underlying rhythm. Patient obtunded for which she was intubated for airway protection on arrival to the ED and transcutaneous pacing was exchanged to transvenous pacing via RIJ (threshold 0.6). Underlying rate 44 bifascicular block. Known RBBB. Per daughters patient had called them over the phone when they noticed she appeared confused. Due to concerns for a possible syncopal episode EMS was called. Prior to event patient had been complaining of malaise and nausea for which she took about two Zofran pills per the daughters.      She had been recently seen on 3/27/25 at the [facility] visit following recent admission for AHRF 2/2 ADHF and COPD exacerbation. On ED presentation BNP elevated to 2500, troponin elevated to 19, CXR with pulmonary edema. She was started on steroids, nebs and antibiotics in addition to IV diuresis. Updated TTE stable from prior, showing HFpEF with elevated CVP 15. Her O2 requirements improved to baseline with diuresis and she was subsequently discharged. At that visit patient had not required midodrine as blood pressure had remained stable and she was asymptomatic.      Off note, patient was taken off OAC due to bleeding  issues and no recurrent of typical atrial flutter after ablation.     Overview/Hospital Course:  Admitted to CCU for CHB in s/o hyperkalemia & in the presence of conduction disease/RBBB & LAHB - stable w/ temporary wire. Septic shock d/t UTI; vasopressors weaned off. HTN; started NG gtt. Acute on chronic hypoxic/hypercapnic RF & obtunded; intubated. Successfully extubated 04/08. MARQUEZ on CKD; improved w/ good UOP response to diuresis. PVCs w/ intermittent pacing by TVP. Switched pip-tazo to amox-clav on 04/09; continued to improve; empiric EED 04/13 for 7 day course. Micra PM by EP placed 04/10. Medically stable for stepdown for placement (very weak).   Patient was extremely short of breath with physical therapy, chest x-ray concerning for pulmonary edema.  Started patient on IV Lasix.    Interval History: NAEON; patient complaining of productive cough, ordered  dextromethorphan.  Urine output in last 24 hours was 1.2 L. administer 1 more dose of 80 mg IV Lasix.      ROS  Objective:     Vital Signs (Most Recent):  Temp: 97.9 °F (36.6 °C) (04/13/25 0429)  Pulse: 88 (04/13/25 0659)  Resp: 18 (04/13/25 0429)  BP: (!) 151/72 (04/13/25 0429)  SpO2: 95 % (04/13/25 0429) Vital Signs (24h Range):  Temp:  [97.5 °F (36.4 °C)-98.6 °F (37 °C)] 97.9 °F (36.6 °C)  Pulse:  [] 88  Resp:  [16-19] 18  SpO2:  [92 %-96 %] 95 %  BP: (122-173)/(59-80) 151/72     Weight: 73.4 kg (161 lb 13.1 oz) (bed wt)  Body mass index is 27.78 kg/m².     SpO2: 95 %     Intake/Output Summary (Last 24 hours) at 4/13/2025 0930  Last data filed at 4/13/2025 0605  Gross per 24 hour   Intake 564 ml   Output 1200 ml   Net -636 ml     Lines/Drains/Airways       Drain  Duration                  Urethral Catheter 04/06/25 0645 7 days              Peripheral Intravenous Line  Duration                  Peripheral IV - Single Lumen 04/10/25 1520 20 G 1 3/4 in Anterior;Right Forearm 2 days         Peripheral IV - Single Lumen 04/11/25 0427 22 G Anterior;Right  Hand 2 days                   Physical Exam  Vitals and nursing note reviewed.   Constitutional:       General: She is not in acute distress.     Appearance: She is ill-appearing.   HENT:      Head: Normocephalic and atraumatic.      Right Ear: External ear normal.      Left Ear: External ear normal.      Mouth/Throat:      Mouth: Mucous membranes are moist.      Pharynx: No oropharyngeal exudate.   Eyes:      General: No scleral icterus.     Extraocular Movements: Extraocular movements intact.      Pupils: Pupils are equal, round, and reactive to light.   Cardiovascular:      Rate and Rhythm: Normal rate.      Pulses:           Carotid pulses are 2+ on the right side and 2+ on the left side.       Radial pulses are 2+ on the right side and 2+ on the left side.        Dorsalis pedis pulses are 1+ on the right side and 1+ on the left side.        Posterior tibial pulses are 1+ on the right side and 1+ on the left side.      Heart sounds: Normal heart sounds. No murmur heard.     Comments: Intermittently paced rhythm  Pulmonary:      Effort: Pulmonary effort is normal. No respiratory distress.      Breath sounds: Normal breath sounds. No wheezing or rales.   Abdominal:      General: Abdomen is flat. Bowel sounds are normal. There is distension.      Palpations: Abdomen is soft.      Tenderness: There is no abdominal tenderness.   Musculoskeletal:         General: No signs of injury. Normal range of motion.      Cervical back: Normal range of motion.      Right lower leg: Edema present.      Left lower leg: Edema present.   Skin:     General: Skin is warm and dry.      Coloration: Skin is pale.   Neurological:      General: No focal deficit present.      Mental Status: She is alert.      Sensory: No sensory deficit.      Motor: No weakness.   Psychiatric:         Mood and Affect: Mood normal.         Behavior: Behavior normal.         Thought Content: Thought content normal.      Significant Labs: All pertinent lab  results from the last 24 hours have been reviewed.    Significant Imaging:  reviewed  Review of Systems  Physical Exam  Vitals and nursing note reviewed.   Constitutional:       General: She is not in acute distress.     Appearance: She is ill-appearing.   HENT:      Head: Normocephalic and atraumatic.      Right Ear: External ear normal.      Left Ear: External ear normal.      Mouth/Throat:      Mouth: Mucous membranes are moist.      Pharynx: No oropharyngeal exudate.   Eyes:      General: No scleral icterus.     Extraocular Movements: Extraocular movements intact.      Pupils: Pupils are equal, round, and reactive to light.   Cardiovascular:      Rate and Rhythm: Normal rate.      Pulses:           Carotid pulses are 2+ on the right side and 2+ on the left side.       Radial pulses are 2+ on the right side and 2+ on the left side.        Dorsalis pedis pulses are 1+ on the right side and 1+ on the left side.        Posterior tibial pulses are 1+ on the right side and 1+ on the left side.      Heart sounds: Normal heart sounds. No murmur heard.     Comments: Intermittently paced rhythm  Pulmonary:      Effort: Pulmonary effort is normal. No respiratory distress.      Breath sounds: Normal breath sounds. No wheezing or rales.   Abdominal:      General: Abdomen is flat. Bowel sounds are normal. There is distension.      Palpations: Abdomen is soft.      Tenderness: There is no abdominal tenderness.   Musculoskeletal:         General: No signs of injury. Normal range of motion.      Cervical back: Normal range of motion.      Right lower leg: Edema present.      Left lower leg: Edema present.   Skin:     General: Skin is warm and dry.      Coloration: Skin is pale.   Neurological:      General: No focal deficit present.      Mental Status: She is alert.      Sensory: No sensory deficit.      Motor: No weakness.   Psychiatric:         Mood and Affect: Mood normal.         Behavior: Behavior normal.         Thought  "Content: Thought content normal.           Assessment & Plan  Complete heart block      RBBB  Chronic     CKD (chronic kidney disease) stage 3, GFR 30-59 ml/min  Creatine stable for now. BMP reviewed- noted Estimated Creatinine Clearance: 30.9 mL/min (based on SCr of 1.4 mg/dL). according to latest data. Based on current GFR, CKD stage is stage 4 - GFR 15-29.  Monitor UOP and serial BMP and adjust therapy as needed. Renally dose meds. Avoid nephrotoxic medications and procedures.    Centrilobular emphysema  Prior history of smoking. Continue scheduled inhalers once extubated. Antibiotics and Supplemental oxygen and monitor respiratory status closely.   Chronic respiratory failure with hypoxia  Patient with Hypoxic Respiratory failure which is Chronic.  she is on home oxygen at 2 LPM. Supplemental oxygen was provided and noted-       Signs/symptoms of respiratory failure include- tachypnea, increased work of breathing, use of accessory muscles, and lethargy. Contributing diagnoses includes - underlying COPD   MARQUEZ (acute kidney injury)  MARQUEZ is likely due to  MARQUEZ on CKD . Baseline creatinine is  1.1 . Most recent creatinine and eGFR are listed below.  Recent Labs     04/11/25  1725 04/12/25  1801 04/13/25  0901   CREATININE 1.5* 1.3 1.4   EGFRNORACEVR 35* 41* 38*      Plan  - MARQUEZ is stable  - Avoid nephrotoxins and renally dose meds for GFR listed above  - Monitor urine output, serial BMP, and adjust therapy as needed    Acute on chronic congestive heart failure  Most recent BNP and echo results are listed below.  No results for input(s): "BNP" in the last 72 hours.     Echo  Result Date: 4/7/2025    Left Ventricle: The left ventricle is normal in size. Normal wall   thickness. There is low normal systolic function with a visually estimated   ejection fraction of 50 - 55%. Grade II diastolic dysfunction.    Right Ventricle: The right ventricle is normal in size. Wall thickness   is normal. Systolic function is " normal.    IVC/SVC: Patient is ventilated, cannot use IVC diameter to estimate   right atrial pressure.            Current Heart Failure Medications: have been held due to bradycardia and MARQUEZ     Plan  - Monitor strict I&Os and daily weights.    - Place on telemetry    Orthostatic hypotension    holding midodrine and per history patient had not required it since last hospitalization   Iron deficiency anemia due to chronic blood loss  Anemia is likely due to chronic disease due to Chronic Kidney Disease. Most recent hemoglobin and hematocrit are listed below.  Recent Labs     04/11/25  0342 04/12/25  1801 04/13/25  0901   HGB 11.3* 11.8* 11.4*   HCT 36.3* 36.5* 35.8*     Plan  - Monitor serial CBC: Daily  - Transfuse PRBC if patient becomes hemodynamically unstable, symptomatic or H/H drops below 7/21.  - Patient has not received any PRBC transfusions to date  - Patient's anemia is currently stable    Shock  Ms. Misa Barajas is a 78 yo female with COPD on 2L home O2, HFpEF, AFL s/p RFA (6/17/24 w/ Dr. Chruch), CKD III, HTN, HLD, chronic venous insufficiency, orthostatic hypotension who was brought via EMS after per history she was found confused and bradycardiac with HR in the 20s.They started epi and transcutaneous pacing. They had to bag her in route. We do not have strip of underlying rhythm. Patient obtunded for which she was intubated on arrival to the ED and transcutaneous pacing was exchanged to transvenous pacing via RIJ. Underlying rate 44 bifascicular block. Known RBBB.     -Unclear etiology ddx include sepsis, mesenteric ischemia?  - elevated lactic acid. Awaiting CT h/c/a/p; resolved  - bradycardia most likely in the setting of electrolyte disturbance and abnormal thyroid function  - correcting electrolyte disturbance. Suspect most likely she will then not require tvp.   - obtaining bcx and ucx. Broad spectrum abx ppx and deescalating as needed.   No cx growth, empiric tx for urosepsis w/ pip-tazo  (start 04/06)  Complete amox-clav (EED 04/13) for 7 day course  - orosco placement  EP following  S/p Micra PM on 04/10  Subclinical hypothyroidism      VTE Risk Mitigation (From admission, onward)           Ordered     apixaban tablet 5 mg  2 times daily         04/11/25 1201     heparin (porcine) 2,000 Units in 0.9% NaCl 1,000 mL  As needed (PRN)         04/10/25 1256     heparin infusion 1,000 units/500 ml in 0.9% NaCl (on sterile field)  As needed (PRN)         04/10/25 1256     IP VTE HIGH RISK PATIENT  Once         04/06/25 0512     Place sequential compression device  Until discontinued         04/06/25 0454                    Discharge Planning   NOHEMI: 4/15/2025     Code Status: Full Code   Medical Readiness for Discharge Date:   Discharge Plan A: Home Health                Please place Justification for DME        Dominga Singer MD  Department of Hospital Medicine   [facility] - Cardiology Stepdown

## 2025-04-13 NOTE — ASSESSMENT & PLAN NOTE
Ms. Misa Barajas is a 78 yo female with COPD on 2L home O2, HFpEF, AFL s/p RFA (6/17/24 w/ Dr. Church), CKD III, HTN, HLD, chronic venous insufficiency, orthostatic hypotension who was brought via EMS after per history she was found confused and bradycardiac with HR in the 20s.They started epi and transcutaneous pacing. They had to bag her in route. We do not have strip of underlying rhythm. Patient obtunded for which she was intubated on arrival to the ED and transcutaneous pacing was exchanged to transvenous pacing via RIJ. Underlying rate 44 bifascicular block. Known RBBB.     -Unclear etiology ddx include sepsis, mesenteric ischemia?  - elevated lactic acid. Awaiting CT h/c/a/p; resolved  - bradycardia most likely in the setting of electrolyte disturbance and abnormal thyroid function  - correcting electrolyte disturbance. Suspect most likely she will then not require tvp.   - obtaining bcx and ucx. Broad spectrum abx ppx and deescalating as needed.   No cx growth, empiric tx for urosepsis w/ pip-tazo (start 04/06)  Complete amox-clav (EED 04/13) for 7 day course  - orosco placement  EP following  S/p Micra PM on 04/10

## 2025-04-13 NOTE — PLAN OF CARE
Pt maintained free from falls/trauma/injuries and skin breakdown. Pt dc/o itching to both arms at 600. Muniz care completed. Plan of care reviewed. Pt verbalized understanding. All questions and concerns addressed.

## 2025-04-13 NOTE — ASSESSMENT & PLAN NOTE
Creatine stable for now. BMP reviewed- noted Estimated Creatinine Clearance: 30.9 mL/min (based on SCr of 1.4 mg/dL). according to latest data. Based on current GFR, CKD stage is stage 4 - GFR 15-29.  Monitor UOP and serial BMP and adjust therapy as needed. Renally dose meds. Avoid nephrotoxic medications and procedures.

## 2025-04-14 NOTE — ASSESSMENT & PLAN NOTE
"Most recent BNP and echo results are listed below.  No results for input(s): "BNP" in the last 72 hours.     Echo  Result Date: 4/7/2025    Left Ventricle: The left ventricle is normal in size. Normal wall   thickness. There is low normal systolic function with a visually estimated   ejection fraction of 50 - 55%. Grade II diastolic dysfunction.    Right Ventricle: The right ventricle is normal in size. Wall thickness   is normal. Systolic function is normal.    IVC/SVC: Patient is ventilated, cannot use IVC diameter to estimate   right atrial pressure.            Current Heart Failure Medications: have been held due to bradycardia and MARQUEZ     Plan  - Monitor strict I&Os and daily weights.    - Place on telemetry  - resolved, switch to home bumex    "

## 2025-04-14 NOTE — ASSESSMENT & PLAN NOTE
Creatine stable for now. BMP reviewed- noted Estimated Creatinine Clearance: 36.1 mL/min (based on SCr of 1.2 mg/dL). according to latest data. Based on current GFR, CKD stage is stage 4 - GFR 15-29.  Monitor UOP and serial BMP and adjust therapy as needed. Renally dose meds. Avoid nephrotoxic medications and procedures.

## 2025-04-14 NOTE — NURSING
Notified Alondra.MD pt had not urinated since orosco removal at 0530, and pt refused to get to bedside commode d/t she worry she will make her SOB, and RN explained to her will give  her more O2 if sat O2 dropped, and pt still refused to get on bedside commode, she said she doesn't feel like to urinate, bladder scan result is 160 mL, pt drank 120 mL water and few bites peach this AM. NO new order, will bladder scan in 6 hours if no urine output. 1320 pt urinated 200 mL, made team aware.

## 2025-04-14 NOTE — ASSESSMENT & PLAN NOTE
-Known RBBB. Admitted with CHB and shock.  Bradycardic with HR in the 20s on arrival, transcutaneous pacing/epi initiated, admitted to CCU  - bradycardia most likely in the setting of electrolyte disturbance and abnormal thyroid function  Plan:  -s/p micra PM placement 4/10 by EP  - correct electrolyte derangements  -f/u with EP

## 2025-04-14 NOTE — PROGRESS NOTES
[facility] - Cardiology [facility] Medicine  Progress Note    Patient Name: Misa Barajas  MRN: [id number]  Patient Class: IP- Inpatient   Admission Date: 4/6/2025  Length of Stay: 8 days  Attending Physician: Reinaldo Cantu DO  Primary Care Provider: Isela Langston MD        Subjective     Principal Problem:Complete heart block        HPI:  Ms. Misa Barajas is a 80 yo female with COPD on 2L home O2, HFpEF, AFL s/p RFA (6/17/24 w/ Dr. Church), CKD III, HTN, HLD, chronic venous insufficiency, orthostatic hypotension who was brought via EMS after per history she was found confused and bradycardiac with HR in the 20s.They started epi and transcutaneous pacing. Given Versed for the transcutaneous pacing. They had to bag her in route. We do not have strip of underlying rhythm. Patient obtunded for which she was intubated for airway protection on arrival to the ED and transcutaneous pacing was exchanged to transvenous pacing via RIJ (threshold 0.6). Underlying rate 44 bifascicular block. Known RBBB. Per daughters patient had called them over the phone when they noticed she appeared confused. Due to concerns for a possible syncopal episode EMS was called. Prior to event patient had been complaining of malaise and nausea for which she took about two Zofran pills per the daughters.      She had been recently seen on 3/27/25 at the [facility] visit following recent admission for AHRF 2/2 ADHF and COPD exacerbation. On ED presentation BNP elevated to 2500, troponin elevated to 19, CXR with pulmonary edema. She was started on steroids, nebs and antibiotics in addition to IV diuresis. Updated TTE stable from prior, showing HFpEF with elevated CVP 15. Her O2 requirements improved to baseline with diuresis and she was subsequently discharged. At that visit patient had not required midodrine as blood pressure had remained stable and she was asymptomatic.      Off note, patient was taken off OAC due to bleeding issues and  no recurrent of typical atrial flutter after ablation.     Overview/Hospital Course:  Admitted to CCU for CHB in s/o hyperkalemia & in the presence of conduction disease/RBBB & LAHB - stable w/ temporary wire. Septic shock d/t UTI; vasopressors weaned off. HTN; started NG gtt. Acute on chronic hypoxic/hypercapnic RF & obtunded; intubated. Successfully extubated 04/08. MARQUEZ on CKD; improved w/ good UOP response to diuresis. PVCs w/ intermittent pacing by TVP. Switched pip-tazo to amox-clav on 04/09; continued to improve; empiric EED 04/13 for 7 day course. Micra PM by EP placed 04/10. Medically stable for stepdown. Patient was extremely short of breath with physical therapy, chest x-ray concerning for pulmonary edema.  Started patient on IV diuresis, now euvolemic.  Not agreeable for discharge to facility, plan to d/c with HH when ready    Interval History: Seen this AM at bedside.  No acute events reported overnight.  Muniz removed last night by RN.  States that her shortness for breath is back to baseline.  Denies orthopnea, lower extremity edema    Dtr Esther updated over the phone    Review of Systems  Objective:     Vital Signs (Most Recent):  Temp: 97.8 °F (36.6 °C) (04/14/25 0431)  Pulse: 100 (04/14/25 0659)  Resp: 18 (04/14/25 0431)  BP: (!) 152/67 (04/14/25 0700)  SpO2: (!) 94 % (04/14/25 0431) Vital Signs (24h Range):  Temp:  [97.6 °F (36.4 °C)-98 °F (36.7 °C)] 97.8 °F (36.6 °C)  Pulse:  [] 100  Resp:  [18] 18  SpO2:  [93 %-96 %] 94 %  BP: (143-181)/(64-86) 152/67     Weight: 73.4 kg (161 lb 13.1 oz) (bed wt)  Body mass index is 27.78 kg/m².    Intake/Output Summary (Last 24 hours) at 4/14/2025 0921  Last data filed at 4/14/2025 0500  Gross per 24 hour   Intake 100 ml   Output 1125 ml   Net -1025 ml         Physical Exam  Vitals and nursing note reviewed.   Constitutional:       General: She is not in acute distress.     Appearance: She is not ill-appearing.   HENT:      Head: Normocephalic and  atraumatic.      Right Ear: External ear normal.      Left Ear: External ear normal.      Mouth/Throat:      Mouth: Mucous membranes are moist.      Pharynx: No oropharyngeal exudate.   Eyes:      General: No scleral icterus.     Extraocular Movements: Extraocular movements intact.      Pupils: Pupils are equal, round, and reactive to light.   Cardiovascular:      Rate and Rhythm: Normal rate.      Heart sounds: Normal heart sounds. No murmur heard.     Comments: paced rhythm  Pulmonary:      Effort: Pulmonary effort is normal. No respiratory distress.      Breath sounds: Normal breath sounds. No wheezing or rales.   Abdominal:      General: Abdomen is flat. Bowel sounds are normal. There is no distension.      Palpations: Abdomen is soft.      Tenderness: There is no abdominal tenderness.   Musculoskeletal:         General: No signs of injury. Normal range of motion.      Cervical back: Normal range of motion.      Right lower leg: No edema.      Left lower leg: No edema.   Skin:     General: Skin is warm and dry.   Neurological:      General: No focal deficit present.      Mental Status: She is alert.      Sensory: No sensory deficit.      Motor: No weakness.   Psychiatric:         Mood and Affect: Mood normal.         Behavior: Behavior normal.         Thought Content: Thought content normal.               Significant Labs: All pertinent labs within the past 24 hours have been reviewed.    Significant Imaging: I have reviewed all pertinent imaging results/findings within the past 24 hours.      Assessment & Plan  Complete heart block  RBBB  Shock  -Known RBBB. Admitted with CHB and shock.  Bradycardic with HR in the 20s on arrival, transcutaneous pacing/epi initiated, admitted to CCU  - bradycardia most likely in the setting of electrolyte disturbance and abnormal thyroid function  Plan:  -s/p micra PM placement 4/10 by EP  - correct electrolyte derangements  -f/u with EP        CKD (chronic kidney disease) stage  "3, GFR 30-59 ml/min  Creatine stable for now. BMP reviewed- noted Estimated Creatinine Clearance: 36.1 mL/min (based on SCr of 1.2 mg/dL). according to latest data. Based on current GFR, CKD stage is stage 4 - GFR 15-29.  Monitor UOP and serial BMP and adjust therapy as needed. Renally dose meds. Avoid nephrotoxic medications and procedures.    Centrilobular emphysema  Prior history of smoking. Continue scheduled inhalers once extubated. Antibiotics and Supplemental oxygen and monitor respiratory status closely.   Chronic respiratory failure with hypoxia  Patient with Hypoxic Respiratory failure which is Chronic.  she is on home oxygen at 2 LPM. Supplemental oxygen was provided and noted-       Signs/symptoms of respiratory failure include- tachypnea, increased work of breathing, use of accessory muscles, and lethargy. Contributing diagnoses includes - underlying COPD   -transition to home bumex  MARQUEZ (acute kidney injury)  MARQUEZ is likely due to  MARQUEZ on CKD . Baseline creatinine is  1.1 . Most recent creatinine and eGFR are listed below.  Recent Labs     04/12/25  1801 04/13/25  0901 04/14/25  0500   CREATININE 1.3 1.4 1.2   EGFRNORACEVR 41* 38* 46*      Plan  - MARQUEZ is stable  - Avoid nephrotoxins and renally dose meds for GFR listed above  - Monitor urine output, serial BMP, and adjust therapy as needed    Acute on chronic congestive heart failure  Most recent BNP and echo results are listed below.  No results for input(s): "BNP" in the last 72 hours.     Echo  Result Date: 4/7/2025    Left Ventricle: The left ventricle is normal in size. Normal wall   thickness. There is low normal systolic function with a visually estimated   ejection fraction of 50 - 55%. Grade II diastolic dysfunction.    Right Ventricle: The right ventricle is normal in size. Wall thickness   is normal. Systolic function is normal.    IVC/SVC: Patient is ventilated, cannot use IVC diameter to estimate   right atrial pressure.            Current " Heart Failure Medications: have been held due to bradycardia and MARQUEZ     Plan  - Monitor strict I&Os and daily weights.    - Place on telemetry  - resolved, switch to home bumex    Orthostatic hypotension    holding midodrine and per history patient had not required it since last hospitalization   Iron deficiency anemia due to chronic blood loss  Anemia is likely due to chronic disease due to Chronic Kidney Disease. Most recent hemoglobin and hematocrit are listed below.  Recent Labs     04/12/25  1801 04/13/25  0901 04/14/25  0500   HGB 11.8* 11.4* 12.3   HCT 36.5* 35.8* 38.5     Plan  - Monitor serial CBC: Daily  - Transfuse PRBC if patient becomes hemodynamically unstable, symptomatic or H/H drops below 7/21.  - Patient has not received any PRBC transfusions to date  - Patient's anemia is currently stable    Subclinical hypothyroidism      VTE Risk Mitigation (From admission, onward)           Ordered     apixaban tablet 5 mg  2 times daily         04/11/25 1201     IP VTE HIGH RISK PATIENT  Once         04/06/25 0512     Place sequential compression device  Until discontinued         04/06/25 0454                    Discharge Planning   NOHEMI: 4/15/2025     Code Status: Full Code   Medical Readiness for Discharge Date:   Discharge Plan A: Home Health                Please place Justification for DME        Reinaldo Cantu DO  Department of Hospital Medicine   [facility] - Cardiology Stepdown

## 2025-04-14 NOTE — NURSING
"Notified ELVIRA Ley of pt bp of 172/81 . See chart    Np stated "If she is 172/81 and asymptomatic I to treat next scheduled hydralazine dose".       "

## 2025-04-14 NOTE — PT/OT/SLP PROGRESS
Speech Language Pathology Treatment    Patient Name:  Misa Barajas   MRN:  [id number]  Admitting Diagnosis: Complete heart block    Recommendations:                 General Recommendations:  Dysphagia therapy  Diet recommendations:  Regular Diet - IDDSI Level 7, Liquid Diet Level: Thin liquids - IDDSI Level 0   Aspiration Precautions: Standard aspiration precautions   General Precautions: Standard, aspiration  Communication strategies:  none    Assessment:     Misa Barajas is a 79 y.o. female with an SLP diagnosis of Dysphagia.     Subjective     Awake/alert    Pain/Comfort:  Pain Rating 1: 0/10  Pain Rating Post-Intervention 1: 0/10    Respiratory Status: Room air    Objective:     Has the patient been evaluated by SLP for swallowing?   Yes  Keep patient NPO? No       Pt repositioned upright in bed for PO trials. Breakfast at bedside mostly untouched. Pt denied any difficulty with current PO intake at this time, but stated she does not have much appetite. Her voice remains breathy and dysphonic, recommend ENT consult if dysphonia persists. She tolerated luis cracker x4 and thin liquids x8 with timely swallow initiation and no overt s/s of airway compromise. Recommend regular diet/thin liquids at this time. SLP will follow up to ensure diet tolerance. SLP educated pt on importance of nutrition and diet recommendations. She verbalized understanding.     Goals:   Multidisciplinary Problems       SLP Goals          Problem: SLP    Goal Priority Disciplines Outcome   SLP Goal     SLP    Description: Speech Language Pathology Goals  Goals expected to be met by 4/17    1. Pt will participate in ongoing swallow assessment                                Plan:     Patient to be seen:  4 x/week   Plan of Care reviewed with:  patient, daughter   SLP Follow-Up:  Yes       Discharge recommendations:    low      Time Tracking:     SLP Treatment Date:   04/14/25  Speech Start Time:  1014  Speech Stop Time:  1022     Speech  Total Time (min):  8 min    Billable Minutes: Treatment Swallowing Dysfunction 8    04/14/2025

## 2025-04-14 NOTE — PLAN OF CARE
MARQUEZ has resolved.  Nephrology will sign-off.  Please re-consult if needed.    Ethan Roque NP  Nephrology

## 2025-04-14 NOTE — PT/OT/SLP PROGRESS
Occupational Therapy  Co -  Treatment with PT    Co-evaluation/treatment performed due to patient's multiple deficits requiring two skilled therapists to appropriately and safely assess patient's strength and endurance while facilitating functional tasks in addition to accommodating for patient's activity tolerance.       Name: Misa Barajas  MRN: [id number]  Admitting Diagnosis:  Complete heart block  4 Days Post-Op    Recommendations:     Discharge Recommendations: Moderate Intensity Therapy  Discharge Equipment Recommendations:  walker, rolling  Barriers to discharge:  Decreased caregiver support    Assessment:     Misa Barajas is a 79 y.o. female with a medical diagnosis of Complete heart block.  She presents with performance deficits affecting function are impaired endurance, impaired self care skills, impaired functional mobility, gait instability, weakness, impaired balance, impaired cardiopulmonary response to activity, impaired fine motor, edema, decreased coordination, decreased upper extremity function, decreased lower extremity function, pain, impaired coordination.     Pt encountered with HOB raised, pleasant demeanor and agreeable to therapy with encouragement.  Pt found soiled and OT/PT assisted with pericare with total A. Pt able to complete STS transfer with significant assistance of 2 people and RW and bed to chair transfer with significant assistance of 2 people with RW.  Pt c/o loss of breath, pulse O2 indicated spO2 @ 92-93 with  after transfer.  Pt assisted to comfortable position, RN notified. Pt on pathway to receive post acute therapy at mod level of intensity.     Rehab Prognosis:  Good; patient would benefit from acute skilled OT services to address these deficits and reach maximum level of function.       Plan:     Patient to be seen 4 x/week to address the above listed problems via self-care/home management, therapeutic activities, therapeutic exercises, neuromuscular  "re-education  Plan of Care Expires: 05/13/25  Plan of Care Reviewed with: patient    Subjective     Chief Complaint: "I don't really want to get up, I'm so tired"  Pt educated re: benefits of participation in therapy  Patient/Family Comments/goals: Get stronger, return home   Pain/Comfort:  Pain Rating 1: 0/10  Pain Rating Post-Intervention 1: 0/10    Objective:     Communicated with: RN prior to session.  Patient found HOB elevated with oxygen, peripheral IV, pulse ox (continuous), telemetry upon OT entry to room.    General Precautions: Standard, aspiration    Orthopedic Precautions:N/A  Braces: N/A  Respiratory Status: Nasal cannula, flow 6 L/min     Occupational Performance:     Bed Mobility:    Patient completed Rolling/Turning to Left with  moderate assistance and with leg lift  Patient completed Rolling/Turning to Right with moderate assistance  Patient completed Scooting/Bridging with minimum assistance  Patient completed Supine to Sit with moderate assistance, with leg lift, and trunk support   Pt able to sit EOB with good upright balance, SBA    Functional Mobility/Transfers:  Patient completed Sit <> Stand Transfer with moderate assistance and of 2 persons  with  rolling walker   Patient completed Bed <> Chair Transfer using Step Transfer technique with moderate assistance and of 2 persons with rolling walker with decreased step faith     Activities of Daily Living:  Upper Body Dressing: moderate assistance doffing soiled gown and donning fresh anterior/posterior gown   Toileting: total assistance completing anterior/posterior care      [facility] 6 Click ADL: 11    Treatment & Education:  Pt educated on role of OT, POC, and goals for therapy.    POC was dicussed with patient/caregiver, who was included in its development and is in agreement with the identified goals and treatment plan.   Patient and family aware of patient's deficits and therapy progression.   Time provided for therapeutic counseling and " discussion of health disposition.   Educated on importance of EOB/OOB mobility, maintaining routine, sitting up in chair, and maximizing independence with ADLs during admission   Pt completed ADLs and functional mobility for treatment session as noted above   Pt/caregiver verbalized understanding and expressed no further concerns/questions.  Updated communication board with level of assist required      Patient left up in chair with all lines intact and RN notified    GOALS:   Multidisciplinary Problems       Occupational Therapy Goals          Problem: Occupational Therapy    Goal Priority Disciplines Outcome Interventions   Occupational Therapy Goal     OT, PT/OT Progressing    Description: Goals to be met by: 5/13/2025     Patient will increase functional independence with ADLs by performing:    UE Dressing with Minimal Assistance.  LE Dressing with Moderate Assistance.  Grooming while standing at sink with Contact Guard Assistance.  Toileting from bedside commode with Minimal Assistance for hygiene and clothing management.   Step transfer with Moderate Assistance  Toilet transfer to bedside commode with Moderate Assistance.                         DME Justifications:   Misa's mobility limitation cannot be sufficiently resolved by the use of a cane. Her functional mobility deficit can be sufficiently resolved with the use of a Rolling Walker. Patient's mobility limitation significantly impairs their ability to participate in one of more activities of daily living.  The use of a RW will significantly improve the patient's ability to participate in MRADLS and the patient will use it on regular basis in the home.    Time Tracking:     OT Date of Treatment: 04/14/25  OT Start Time: 1158  OT Stop Time: 1231  OT Total Time (min): 33 min    Billable Minutes:Self Care/Home Management 33    OT/MARYA: OT          4/14/2025

## 2025-04-14 NOTE — ASSESSMENT & PLAN NOTE
Patient with Hypoxic Respiratory failure which is Chronic.  she is on home oxygen at 2 LPM. Supplemental oxygen was provided and noted-       Signs/symptoms of respiratory failure include- tachypnea, increased work of breathing, use of accessory muscles, and lethargy. Contributing diagnoses includes - underlying COPD   -transition to home bumex

## 2025-04-14 NOTE — ASSESSMENT & PLAN NOTE
MARQUEZ is likely due to MARQUEZ on CKD. Baseline creatinine is 1.1. Most recent creatinine and eGFR are listed below.  Recent Labs     04/12/25  1801 04/13/25  0901 04/14/25  0500   CREATININE 1.3 1.4 1.2   EGFRNORACEVR 41* 38* 46*      Plan  - MARQUEZ is stable  - Avoid nephrotoxins and renally dose meds for GFR listed above  - Monitor urine output, serial BMP, and adjust therapy as needed

## 2025-04-14 NOTE — PLAN OF CARE
Pt maintained free from falls/trauma/injuries and skin breakdown. Pt denied pain or discomfort. Bp elevated overnight. Plan of care reviewed. Pt verbalized understanding. All questions and concerns addressed.

## 2025-04-14 NOTE — PT/OT/SLP PROGRESS
Physical Therapy Treatment    Patient Name:  Misa Barajas   MRN:  [id number]    Recommendations:     Discharge Recommendations: Moderate Intensity Therapy  Discharge Equipment Recommendations: walker, rolling, bedside commode  Barriers to discharge: Inaccessible home and Decreased caregiver support    Assessment:     Misa Barajas is a 79 y.o. female admitted with a medical diagnosis of Complete heart block.  She presents with the following impairments/functional limitations: weakness, impaired self care skills, decreased safety awareness, impaired functional mobility, decreased lower extremity function, gait instability, impaired endurance, impaired cardiopulmonary response to activity. Pt required maximal encouragement to work with PT/OT. Pt O2 levels remained WFL throughout session. Patient continues to demonstrate the need for moderate intensity therapy on a daily basis post acute to address deficits and progress towards prior level of function. Pt would continue to benefit from skilled acute PT in order to address current deficits and progress functional mobility.     Co-treatment performed due to patient's multiple deficits requiring two skilled therapists to appropriately and safely assess patient's strength and endurance while facilitating functional tasks in addition to accommodating for patient's activity tolerance.      Rehab Prognosis: Good; patient would benefit from acute skilled PT services to address these deficits and reach maximum level of function.    Recent Surgery: Procedure(s) (LRB):  INSERTION, CARDIAC PACEMAKER, LEADLESS (N/A)  Removal, Pacemaker, Temporary Transvenous 4 Days Post-Op    Plan:     During this hospitalization, patient to be seen 4 x/week to address the identified rehab impairments via gait training, therapeutic activities, therapeutic exercises, neuromuscular re-education and progress toward the following goals:    Plan of Care Expires:  05/13/25    Subjective     Chief  Complaint: none  Patient/Family Comments/goals: improve function   Pain/Comfort:  Pain Rating 1: 0/10      Objective:     Communicated with RN prior to session.  Patient found HOB elevated with oxygen, pulse ox (continuous), telemetry upon PT entry to room.     General Precautions: Standard, aspiration  Orthopedic Precautions: N/A  Braces: N/A  Respiratory Status: Nasal cannula, flow 6 L/min     Functional Mobility:  Bed Mobility:     Rolling Left:  minimum assistance  Rolling Right: minimum assistance  To perform magnolia-cleaning   Scooting: stand by assistance  Bridging: stand by assistance  Supine to Sit: moderate assistance    Transfers:     Sit to Stand:  moderate assistance and of 2 persons with rolling walker  Bed to Chair: moderate assistance and of 2 persons with  rolling walker  using  Step Transfer    Gait: x5 steps to the R; ModA of 2 with RW using step transfer   Cueing for hand placement, sequencing, and RW management   Pt with increased ability to clear feet for lateral steps    Balance:   Static Sit: NORMAL; SBA  Dynamic Sit; GOOD; SBA      AM-PAC 6 CLICK MOBILITY  Turning over in bed (including adjusting bedclothes, sheets and blankets)?: 2  Sitting down on and standing up from a chair with arms (e.g., wheelchair, bedside commode, etc.): 2  Moving from lying on back to sitting on the side of the bed?: 2  Moving to and from a bed to a chair (including a wheelchair)?: 1  Need to walk in hospital room?: 2  Climbing 3-5 steps with a railing?: 1  Basic Mobility Total Score: 10       Treatment & Education:  Educated pt on PT role/POC  Educated pt on importance of OOB activity and daily ambulation  Time provided for active listening, education, counseling and discussion of health disposition in regards to patient's current status   Questions/concerns addressed within PTA scope of practice. Answered to pt satisfaction, no further questions  White board updated accordingly   RN aware of patient's mobility needs  and status   Rosa-cleaning performed   O2 levels stayed WFL   Increased cueing for RW management   Energy conservation technique education     Patient left up in chair with all lines intact, call button in reach, and RN notified..    GOALS:   Multidisciplinary Problems       Physical Therapy Goals          Problem: Physical Therapy    Goal Priority Disciplines Outcome Interventions   Physical Therapy Goal     PT, PT/OT Progressing    Description: PT goals until 4/24/25    1. Pt supine to sit with CGA-not met  2. Pt sit to supine with CGA-not met  3. Pt sit to stand with RW with CGA-not met  4. Pt to perform gait 10ft with RW with CGA.-not met  5. Pt to transfer bed to/from bedside chair with HHA with minimal assist.-not met  6. Pt to up/down 1 step with RW with minimal assist.-not met  7. Pt to perform B LE exs in sitting or supine x 10 reps to strengthen B LE to improve functional mobility.-not met    DME Justifications (see above for complete DME recommendations)    Bedside Commode- Patient has a mobility limitation that significantly impairs their ability to participate in one or more mobility related activities of daily living, including toileting. This deficit can be resolved by using a bedside commode. Patient demonstrates mobility limitations that will cause them to be confined to one room at home without bathroom access for up to 30 days. Using a bedside commode will greatly improve the patient's ability to participate in MRADLs.     Rolling Walker- Patient demonstrates a mobility limitation that significantly impairs their ability to participate in one or more mobility related activities of daily living. Patient's mobility limitation cannot be sufficiently resolved with the use of a cane, but can be sufficiently resolved with the use of a rolling walker.The use of a rolling walker will considerably improve their ability to participate in MRADLs. Patient will use the walker on a regular basis at home.                               DME Justifications:  Misa Barajas has a mobility limitation that significantly impairs her ability to participate in one or more mobility related activities of daily living (MRADLs) such as toileting, feeding, dressing, grooming, and bathing in customary locations in the home.  The mobility limitation cannot be sufficiently resolved by the use of a cane or walker.   Patients upper body strength is not sufficient to propel a standard WC. The use of a lightweight wheelchair will significantly improve the patients ability to participate in MRADLS and the patient will use it on regular basis in the home.   She has a caregiver who is available, willing, and able to provide assistance with the wheelchair when needed.     Time Tracking:     PT Received On: 04/14/25  PT Start Time: 1157     PT Stop Time: 1231  PT Total Time (min): 34 min     Billable Minutes: Gait Training 12 and Therapeutic Activity 22    Treatment Type: Treatment  PT/PTA: PTA     Number of PTA visits since last PT visit: 1 04/14/2025

## 2025-04-14 NOTE — ASSESSMENT & PLAN NOTE
Anemia is likely due to chronic disease due to Chronic Kidney Disease. Most recent hemoglobin and hematocrit are listed below.  Recent Labs     04/12/25  1801 04/13/25  0901 04/14/25  0500   HGB 11.8* 11.4* 12.3   HCT 36.5* 35.8* 38.5     Plan  - Monitor serial CBC: Daily  - Transfuse PRBC if patient becomes hemodynamically unstable, symptomatic or H/H drops below 7/21.  - Patient has not received any PRBC transfusions to date  - Patient's anemia is currently stable

## 2025-04-14 NOTE — SUBJECTIVE & OBJECTIVE
Interval History: Seen this AM at bedside.  No acute events reported overnight.  Flavio removed last night by RN.  States that her shortness for breath is back to baseline.  Denies orthopnea, lower extremity edema    Dtr Esther updated over the phone    Review of Systems  Objective:     Vital Signs (Most Recent):  Temp: 97.8 °F (36.6 °C) (04/14/25 0431)  Pulse: 100 (04/14/25 0659)  Resp: 18 (04/14/25 0431)  BP: (!) 152/67 (04/14/25 0700)  SpO2: (!) 94 % (04/14/25 0431) Vital Signs (24h Range):  Temp:  [97.6 °F (36.4 °C)-98 °F (36.7 °C)] 97.8 °F (36.6 °C)  Pulse:  [] 100  Resp:  [18] 18  SpO2:  [93 %-96 %] 94 %  BP: (143-181)/(64-86) 152/67     Weight: 73.4 kg (161 lb 13.1 oz) (bed wt)  Body mass index is 27.78 kg/m².    Intake/Output Summary (Last 24 hours) at 4/14/2025 0921  Last data filed at 4/14/2025 0500  Gross per 24 hour   Intake 100 ml   Output 1125 ml   Net -1025 ml         Physical Exam  Vitals and nursing note reviewed.   Constitutional:       General: She is not in acute distress.     Appearance: She is not ill-appearing.   HENT:      Head: Normocephalic and atraumatic.      Right Ear: External ear normal.      Left Ear: External ear normal.      Mouth/Throat:      Mouth: Mucous membranes are moist.      Pharynx: No oropharyngeal exudate.   Eyes:      General: No scleral icterus.     Extraocular Movements: Extraocular movements intact.      Pupils: Pupils are equal, round, and reactive to light.   Cardiovascular:      Rate and Rhythm: Normal rate.      Heart sounds: Normal heart sounds. No murmur heard.     Comments: paced rhythm  Pulmonary:      Effort: Pulmonary effort is normal. No respiratory distress.      Breath sounds: Normal breath sounds. No wheezing or rales.   Abdominal:      General: Abdomen is flat. Bowel sounds are normal. There is no distension.      Palpations: Abdomen is soft.      Tenderness: There is no abdominal tenderness.   Musculoskeletal:         General: No signs of injury.  Normal range of motion.      Cervical back: Normal range of motion.      Right lower leg: No edema.      Left lower leg: No edema.   Skin:     General: Skin is warm and dry.   Neurological:      General: No focal deficit present.      Mental Status: She is alert.      Sensory: No sensory deficit.      Motor: No weakness.   Psychiatric:         Mood and Affect: Mood normal.         Behavior: Behavior normal.         Thought Content: Thought content normal.               Significant Labs: All pertinent labs within the past 24 hours have been reviewed.    Significant Imaging: I have reviewed all pertinent imaging results/findings within the past 24 hours.

## 2025-04-15 PROBLEM — R49.0 DYSPHONIA: Status: ACTIVE | Noted: 2025-04-15

## 2025-04-15 PROBLEM — J38.01 PARALYSIS OF RIGHT VOCAL CORD: Status: ACTIVE | Noted: 2025-04-15

## 2025-04-15 NOTE — CONSULTS
[facility] - Cardiology Step[facility]  Otorhinolaryngology-Head & Neck Surgery  Consult Note    Patient Name: Misa Barajas  MRN: [id number]  Code Status: Full Code  Admission Date: 4/6/2025  Hospital Length of Stay: 9 days  Attending Physician: Reinaldo Cantu DO  Primary Care Provider: Isela Langston MD    Patient information was obtained from patient, past medical records, and ER records.     Inpatient consult to ENT  Consult performed by: Marissa Ngo MD  Consult ordered by: Reinaldo Cantu DO        Subjective:     Chief Complaint/Reason for Consult: dysphonia    History of Present Illness: 78 yo female hx COPD (on home O2), HFpEF, AFL s/p RFA (6/17/24 w/ Dr. Church), CKD III, HTN, HLD, chronic venous insufficiency, orthostatic hypotension BIBEMS after she was found confused and bradycardiac with HR in the 20. ENT consulted for evaluation of dysphonia.     Patient was admitted to cardiac ICU and intubated due to acute on chronic hypoxic/ hypercapnic respiratory failure and obtunded. Extubated 4/8 without difficulty. Patient has been dysphonia since extubation- voice weak and breathy. Stable on home O2 2L NC. Patient not bothered by voice. SLP cleared patient for regular diet. Patient denies any clinical signs aspiration- no coughing while swallowing any consistencies including thin liquids.         Medications:  Continuous Infusions:  Scheduled Meds:   amLODIPine  10 mg Oral Daily    bumetanide  1 mg Oral Daily    hydrALAZINE  25 mg Oral Q8H    levothyroxine  50 mcg Oral Before breakfast    LIDOcaine  1 patch Transdermal Q24H    metoprolol tartrate  12.5 mg Oral BID    miconazole nitrate 2%   Topical (Top) BID    polyethylene glycol  17 g Oral BID    senna  8.6 mg Oral Daily     PRN Meds:  Current Facility-Administered Medications:     acetaminophen, 650 mg, Oral, Q6H PRN    dextromethorphan-guaiFENesin  mg/5 ml, 10 mL, Oral, Q4H PRN    dextrose 50%, 12.5 g, Intravenous, PRN    dextrose 50%, 25 g,  Intravenous, PRN    glucagon (human recombinant), 1 mg, Intramuscular, PRN    glucose, 16 g, Oral, PRN    glucose, 24 g, Oral, PRN    hydrOXYzine HCL, 25 mg, Oral, TID PRN    sodium chloride 0.9%, 10 mL, Intravenous, PRN     No current facility-administered medications on file prior to encounter.     Current Outpatient Medications on File Prior to Encounter   Medication Sig    budesonide-glycopyr-formoterol (BREZTRI AEROSPHERE) 160-9-4.8 mcg/actuation HFAA Inhale 2 puffs into the lungs 2 (two) times daily.    bumetanide (BUMEX) 1 MG tablet Take 1 tablet (1 mg total) by mouth once daily. Okay to take bumex 1mg twice daily as needed for worsening shortness of breath, swelling or 3lb weight gain    ferrous sulfate (FEOSOL) 325 mg (65 mg iron) Tab tablet Take 1 tablet (325 mg total) by mouth every other day.    meclizine (ANTIVERT) 12.5 mg tablet Take 1 tablet (12.5 mg total) by mouth Daily.    metoprolol succinate (TOPROL-XL) 25 MG 24 hr tablet Take ½ tablet (12.5 mg total) by mouth once daily.    midodrine (PROAMATINE) 2.5 MG Tab Take 1 tablet (2.5 mg total) by mouth 3 (three) times daily.    potassium chloride (MICRO-K) 10 MEQ CpSR Take 2 capsules (20 mEq total) by mouth once daily.       Review of patient's allergies indicates:   Allergen Reactions    Adhesive tape-silicones     Adhesive Rash     Pt states she removed a LATEX bandaid and the skin beneath was swollen and red. No other latex causes a reaction.       Past Medical History:   Diagnosis Date    Acute biliary pancreatitis 07/27/2024    Anemia 07/12/2024    Aortic atherosclerosis     Arthritis     knee joint pain    Asthma-COPD overlap syndrome 08/22/2022    home o2    Breast cancer 2002    left breast & lymph nodes-s/p sx with chemo    Cataracts, bilateral     Chronic diastolic heart failure 12/24/2019    Chronic kidney disease, stage 3     Class 1 obesity due to excess calories with serious comorbidity and body mass index (BMI) of 30.0 to 30.9 in adult  05/16/2024    History of chemotherapy     last treatment 12/2002 (had 8 treatments)    Hyperlipidemia 11/20/2016    Hypertension     Lymphedema of both lower extremities 01/25/2022    Nephrolithiasis 06/02/2014    Osteoporosis     Paroxysmal atrial fibrillation 06/07/2021    Renal osteodystrophy 01/14/2016    Shock 4/6/2025    Subclinical hypothyroidism 4/6/2025    Venous stasis dermatitis of both lower extremities 01/25/2022    Vitamin D insufficiency      Past Surgical History:   Procedure Laterality Date    ABLATION, ATRIAL FLUTTER, TYPICAL N/A 6/17/2024    Procedure: Ablation, Atrial Flutter, Typical;  Surgeon: Taz Church MD;  Location: [facility] EP LAB;  Service: Cardiology;  Laterality: N/A;  AFL, RFA, LORENE, MAKAYLA (cx if SR), LEANNE miller, 3 Prep    BREAST BIOPSY Left 2002    core bx, +    BREAST BIOPSY Right 2018    core    BREAST BIOPSY Right 2019    BREAST LUMPECTOMY Left 2002    CYSTOSCOPY  4/28/2022    Procedure: CYSTOSCOPY;  Surgeon: Vik Ware MD;  Location: [facility] OR 1ST FLR;  Service: Urology;;    CYSTOSCOPY  5/30/2022    Procedure: CYSTOSCOPY;  Surgeon: Bonnie Knutson MD;  Location: [facility] OR 1ST FLR;  Service: Urology;;    ECHOCARDIOGRAM,TRANSESOPHAGEAL N/A 6/17/2024    Procedure: Transesophageal echo (MAKAYLA) intra-procedure log documentation;  Surgeon: Nestor Martinez MD;  Location: [facility] EP LAB;  Service: Cardiology;  Laterality: N/A;    ERCP N/A 7/30/2024    Procedure: ERCP (ENDOSCOPIC RETROGRADE CHOLANGIOPANCREATOGRAPHY);  Surgeon: Sierra Cotto MD;  Location: [facility] ENDO (2ND FLR);  Service: Endoscopy;  Laterality: N/A;    IMPLANTATION OF LEADLESS PACEMAKER N/A 4/10/2025    Procedure: INSERTION, CARDIAC PACEMAKER, LEADLESS;  Surgeon: CAROLINE Solis MD;  Location: [facility] EP LAB;  Service: Cardiology;  Laterality: N/A;  CHB, MICRA leadless PPM, MDT, Anes, EH, CICU 3073    LASER LITHOTRIPSY  5/30/2022    Procedure: LITHOTRIPSY, USING LASER;  Surgeon: Bonnie Knutson MD;   [facility];  Service: Urology;;    LITHOTRIPSY      MASTECTOMY Left 06/2002    left-& lymph node dissection    REMOVAL, PACEMAKER, TEMPORARY TRANSVENOUS  4/10/2025    Procedure: Removal, Pacemaker, Temporary Transvenous;  Surgeon: CAROLINE Solis MD;  Location: [facility] EP LAB;  Service: Cardiology;;    REPLACEMENT OF STENT Right 5/30/2022    Procedure: REPLACEMENT, STENT;  Surgeon: Bonnie Knutson MD;  [facility];  Service: Urology;  Laterality: Right;    RETROGRADE PYELOGRAPHY Right 4/28/2022    Procedure: PYELOGRAM, RETROGRADE;  Surgeon: Vik Ware MD;  [facility];  Service: Urology;  Laterality: Right;    ROBOT-ASSISTED LAPAROSCOPIC PARTIAL NEPHRECTOMY USING DA JESÚS XI Right 3/11/2021    Procedure: XI ROBOTIC NEPHRECTOMY, PARTIAL;  Surgeon: Taz Ortega MD;  Location: [facility] OR 2ND FLR;  Service: Urology;  Laterality: Right;  4hr/ gen with regional  [place] confirmation:  [id number] for 11:15am case NC    URETEROSCOPIC REMOVAL OF URETERIC CALCULUS Right 5/30/2022    Procedure: REMOVAL, CALCULUS, URETER, URETEROSCOPIC;  Surgeon: Bonnie Knutson MD;  [facility];  Service: Urology;  Laterality: Right;    ureteroscopy Bilateral     6.14    URETEROSCOPY Right 5/30/2022    Procedure: URETEROSCOPY;  Surgeon: Bonnie Knutson MD;  Location: [facility] OR [address] [place] [address];  Service: Urology;  Laterality: Right;     Family History       Problem Relation (Age of Onset)    Arthritis Mother, Sister    Bone cancer Mother    Breast cancer Mother    Cancer Father    Crohn's disease Daughter    Fibroids Daughter    No Known Problems Brother, Daughter          Tobacco Use    Smoking status: Former     Current packs/day: 0.00     Average packs/day: 1 pack/day for 50.0 years (50.0 ttl pk-yrs)     Types: Cigarettes     Start date: 1960     Quit date: 5/20/2009     Years since quitting: 15.9    Smokeless tobacco: Former   Substance and Sexual Activity     Alcohol use: No    Drug use: No    Sexual activity: Not Currently     Partners: Male     Birth control/protection: Partner-Vasectomy     Review of Systems  Objective:     Vital Signs (Most Recent):  Temp: 97.5 °F (36.4 °C) (04/15/25 1154)  Pulse: 72 (04/15/25 1154)  Resp: 18 (04/15/25 1154)  BP: 122/64 (04/15/25 1154)  SpO2: 96 % (04/15/25 1154) Vital Signs (24h Range):  Temp:  [97.4 °F (36.3 °C)-97.9 °F (36.6 °C)] 97.5 °F (36.4 °C)  Pulse:  [68-98] 72  Resp:  [18-20] 18  SpO2:  [81 %-96 %] 96 %  BP: (122-161)/(58-78) 122/64     Weight: 73.4 kg (161 lb 13.1 oz)  Body mass index is 27.78 kg/m².    Date 04/15/25 0700 - 04/16/25 0659   Shift 7188-3399 9443-3892 1144-4370 24 Hour Total   INTAKE   P.O. 360   360   Shift Total(mL/kg) 360(4.9)   360(4.9)   OUTPUT   Urine(mL/kg/hr) 300   300   Stool 0   0   Shift Total(mL/kg) 300(4.1)   300(4.1)   Weight (kg) 73.4 73.4 73.4 73.4        Physical Exam  Physical Exam    Vitals:    04/15/25 1154   BP: 122/64   Pulse: 72   Resp: 18   Temp: 97.5 °F (36.4 °C)     Body mass index is 27.78 kg/m².    General: AOx3, NAD  Nose: No gross nasal septal deviation.  Inferior Turbinates WNL bilaterally.  No septal perforation.  No masses/lesions.  Oral Cavity: FOM Soft, no masses palpated.  Oral Tongue mobile.  Hard Palate WNL.  Oropharynx: BOT WNL.  No masses/lesions noted.  Tonsillar fossa without lesions.  Soft palate without masses.  Midline uvula.  Neck: No palpable lymphadenopathy at I - VI.    Face: House Brackmann I bilaterally.  Eyes: Normal extra ocular motion bilaterally.  Resp: breathing comfortably on home 2L NC  Abd: nontender, soft   Skin: normal color and tugor  Voice: breathy, weak  Neuro: alert and oriented       PROCEDURE: Flexible Laryngoscopy Exam  The risks, benefits, and alternatives to the procedure were explained. Patient/family  agreed to procedure; verbal consent obtained. The scope was inserted into the nasal cavity. Examination of the nasal cavity, nasopharynx,  oropharynx, hypopharynx, and larynx was performed; all were closely visualized to confirm presence or absence of erythema, edema, or structural lesions.     Pertinent exam findings include the following:  - Nasal cavity: no masses or lesions, pink mucosa, no purulence  - Nasopharynx: no masses or lesions, emily wnl  - Oropharynx: no masses or lesions, tonsils WNL, BOT WNL  - Larynx and Hypopharynx: Mobility of left TVF with normal adduction and abduction, right TVC paralysis with mild hemorrhage true cord, adequate glottic closure     The scope was removed. The patient tolerated the procedure well without complications.                Significant Labs:  CBC:   Recent Labs   Lab 04/15/25  0445   WBC 7.11   RBC 4.54   HGB 12.9   HCT 40.0      MCV 88   MCH 28.4   MCHC 32.3     CMP:   Recent Labs   Lab 04/15/25  0446   CALCIUM 11.1*   ALBUMIN 2.4*      K 3.6   CO2 21*   *   BUN 40*   CREATININE 1.1   ALKPHOS 103   ALT 14   AST 25   BILITOT 0.6       Significant Diagnostics:  None    Assessment/Plan:     Paralysis of right vocal cord  78 yo female with right vocal cord paralysis s/p intubation 4/6-4/8. Voice weak and breathy, patient not bothered by voice. SLP cleared for regular diet. Flexible laryngoscopy demonstrates right TVF paralysis with hemorrhage of true cord, adequate glottic closure. Patient denies aspiration.     - No acute ENT intervention  - Patient uninterested in vocal fold augmentation at this time. Patient can follow up outpatient if patient becomes bothered by voice, has any concerns for aspiration   - Recommend outpatient SLP follow up        Please page ENT resident on call with any questions or concerns.                       Marissa Ngo MD  Otorhinolaryngology-Head & Neck Surgery  [facility] - Cardiology Stepdown

## 2025-04-15 NOTE — PT/OT/SLP PROGRESS
Speech Language Pathology Treatment    Patient Name:  Misa Barajas   MRN:  [id number]  Admitting Diagnosis: Complete heart block    Recommendations:                 General Recommendations:  Dysphagia therapy  Diet recommendations:  Regular Diet - IDDSI Level 7, Liquid Diet Level: Thin liquids - IDDSI Level 0   Aspiration Precautions: Standard aspiration precautions   General Precautions: Standard, aspiration  Communication strategies:  none    Assessment:     Misa Barajas is a 79 y.o. female with an SLP diagnosis of Dysphagia.     Subjective     Awake/alert    Pain/Comfort:  Pain Rating 1: 0/10  Pain Rating Post-Intervention 1: 0/10    Respiratory Status: Room air    Objective:     Has the patient been evaluated by SLP for swallowing?   Yes  Keep patient NPO? No       Pt repositioned upright in bed for PO trials. Pt denied any difficulty with current PO intake at this time, but continues to state she does not have much appetite. Her voice remains breathy and dysphonic, recommend ENT, spoke with team who was in agreement with plan. She declined all PO intake this date. Recommend regular diet/thin liquids at this time. SLP will follow up to ensure diet tolerance. SLP educated pt on importance of nutrition and diet recommendations. She verbalized understanding.     Goals:   Multidisciplinary Problems       SLP Goals          Problem: SLP    Goal Priority Disciplines Outcome   SLP Goal     SLP    Description: Speech Language Pathology Goals  Goals expected to be met by 4/17    1. Pt will participate in ongoing swallow assessment                                Plan:     Patient to be seen:  4 x/week   Plan of Care reviewed with:  patient   SLP Follow-Up:  Yes       Discharge recommendations:    low      Time Tracking:     SLP Treatment Date:   04/15/25  Speech Start Time:  0934  Speech Stop Time:  0942     Speech Total Time (min):  8 min    Billable Minutes: Treatment Swallowing Dysfunction 8    04/15/2025

## 2025-04-15 NOTE — PT/OT/SLP PROGRESS
Occupational Therapy   Treatment    Co-treatment with PT due to pt's requiring 2 skilled therapists to safely perform mobility and to accommodate her activity tolerance    Name: Misa Barajas  MRN: [id number]  Admitting Diagnosis:  Complete heart block  5 Days Post-Op    Recommendations:     Discharge Recommendations: Moderate Intensity Therapy  Discharge Equipment Recommendations:  walker, rolling  Barriers to discharge:  Decreased caregiver support (increased (A) with ADLs and mobility)    Assessment:     Misa Barajas is a 79 y.o. female with a medical diagnosis of Complete heart block.  Pt tolerated session well and without incident, but she continues to require (A) with ADLs and mobility.  She presents with the following.  Performance deficits affecting function are weakness, impaired endurance, impaired self care skills, impaired functional mobility, gait instability, impaired balance, impaired cardiopulmonary response to activity, decreased safety awareness, decreased lower extremity function.     Rehab Prognosis:  Good; patient would benefit from acute skilled OT services to address these deficits and reach maximum level of function.       Plan:     Patient to be seen 4 x/week to address the above listed problems via self-care/home management, therapeutic activities, therapeutic exercises  Plan of Care Expires: 05/13/25  Plan of Care Reviewed with: patient, daughter    Subjective     Chief Complaint: SOB on exertion  Patient/Family Comments/goals: to go home  Pain/Comfort:  Pain Rating 1: 0/10  Pain Rating Post-Intervention 1: 0/10    Objective:     Communicated with: nurse and PT prior to session.  Patient found HOB elevated with oxygen, telemetry, [employer] with her daughter present upon OT entry to room.    General Precautions: Standard, fall, aspiration    Orthopedic Precautions:N/A  Braces: N/A  Respiratory Status: Nasal cannula, flow 3 L/min     Occupational Performance:     Bed Mobility:    Patient  completed Supine to Sit to the R with contact guard assistance     Functional Mobility/Transfers:  Patient completed Sit <> Stand Transfer from EOB x 1 trial with minimum assistance and of 2 persons  with  rolling walker   Patient completed Bed <> Chair Transfer using Step Transfer technique with moderate assistance and of 2 persons with rolling walker  Functional Mobility: Pt ambulated ~2 ft from EOB to the bedside chair with Mod A of 2 with RW, requiring cueing for walker management, hand placement, and LE placement for safe descent.  She was very SOB.     Activities of Daily Living:  Upper Body Dressing: moderate assistance to don back gown as a robe/manage lines while seated EOB  Pt declined to perform grooming tasks and had no toileting needs      [facility] 6 Click ADL: 11    Treatment & Education:  Pt and her daughter edu on role of OT, POC, safety when performing self care tasks, benefit of performing OOB activity, and safety when performing functional transfers and functional mobility.    - Self care tasks completed-- as noted above      - MD entered room to discuss her d/c plans.  Pt verbalized wanting to go home, but therapists educated her on the safety concerns with this (pt lives alone) and the benefits of additional therapy before returning home.    Patient left up in chair with all lines intact, call button in reach, MD notified, and her daughter present    GOALS:   Multidisciplinary Problems       Occupational Therapy Goals          Problem: Occupational Therapy    Goal Priority Disciplines Outcome Interventions   Occupational Therapy Goal     OT, PT/OT Progressing    Description: Goals to be met by: 5/13/2025     Patient will increase functional independence with ADLs by performing:    UE Dressing with Minimal Assistance.  LE Dressing with Moderate Assistance.  Grooming while standing at sink with Contact Guard Assistance.  Toileting from bedside commode with Minimal Assistance for hygiene and clothing  management.   Step transfer with Moderate Assistance  Toilet transfer to bedside commode with Moderate Assistance.                         DME Justifications:   Misa's mobility limitation cannot be sufficiently resolved by the use of a cane. Her functional mobility deficit can be sufficiently resolved with the use of a Rolling Walker. Patient's mobility limitation significantly impairs their ability to participate in one of more activities of daily living.  The use of a RW will significantly improve the patient's ability to participate in MRADLS and the patient will use it on regular basis in the home.    Time Tracking:     OT Date of Treatment: 04/15/25  OT Start Time: 1004  OT Stop Time: 1025  OT Total Time (min): 21 min    Billable Minutes:Therapeutic Activity 21 min    OT/MARYA: OT          4/15/2025

## 2025-04-15 NOTE — PLAN OF CARE
Unable to get accurate urine output d/t pt urinated when had BM x1. VSS. BG monitored. K replaced in the AM. Pt up in chair during the day. Pt educated on fall risk and remained free from falls/trauma/injury. Denies chest pain, SOB, palpitations, dizziness, pain, or discomfort. Plan of care reviewed with pt, all questions answered. Bed locked in lowest position, call bell within reach, no acute distress noted, will continue to monitor.

## 2025-04-15 NOTE — SUBJECTIVE & OBJECTIVE
Medications:  Continuous Infusions:  Scheduled Meds:   amLODIPine  10 mg Oral Daily    bumetanide  1 mg Oral Daily    hydrALAZINE  25 mg Oral Q8H    levothyroxine  50 mcg Oral Before breakfast    LIDOcaine  1 patch Transdermal Q24H    metoprolol tartrate  12.5 mg Oral BID    miconazole nitrate 2%   Topical (Top) BID    polyethylene glycol  17 g Oral BID    senna  8.6 mg Oral Daily     PRN Meds:  Current Facility-Administered Medications:     acetaminophen, 650 mg, Oral, Q6H PRN    dextromethorphan-guaiFENesin  mg/5 ml, 10 mL, Oral, Q4H PRN    dextrose 50%, 12.5 g, Intravenous, PRN    dextrose 50%, 25 g, Intravenous, PRN    glucagon (human recombinant), 1 mg, Intramuscular, PRN    glucose, 16 g, Oral, PRN    glucose, 24 g, Oral, PRN    hydrOXYzine HCL, 25 mg, Oral, TID PRN    sodium chloride 0.9%, 10 mL, Intravenous, PRN     No current facility-administered medications on file prior to encounter.     Current Outpatient Medications on File Prior to Encounter   Medication Sig    budesonide-glycopyr-formoterol (BREZTRI AEROSPHERE) 160-9-4.8 mcg/actuation HFAA Inhale 2 puffs into the lungs 2 (two) times daily.    bumetanide (BUMEX) 1 MG tablet Take 1 tablet (1 mg total) by mouth once daily. Okay to take bumex 1mg twice daily as needed for worsening shortness of breath, swelling or 3lb weight gain    ferrous sulfate (FEOSOL) 325 mg (65 mg iron) Tab tablet Take 1 tablet (325 mg total) by mouth every other day.    meclizine (ANTIVERT) 12.5 mg tablet Take 1 tablet (12.5 mg total) by mouth Daily.    metoprolol succinate (TOPROL-XL) 25 MG 24 hr tablet Take ½ tablet (12.5 mg total) by mouth once daily.    midodrine (PROAMATINE) 2.5 MG Tab Take 1 tablet (2.5 mg total) by mouth 3 (three) times daily.    potassium chloride (MICRO-K) 10 MEQ CpSR Take 2 capsules (20 mEq total) by mouth once daily.       Review of patient's allergies indicates:   Allergen Reactions    Adhesive tape-silicones     Adhesive Rash     Pt states she  removed a LATEX bandaid and the skin beneath was swollen and red. No other latex causes a reaction.       Past Medical History:   Diagnosis Date    Acute biliary pancreatitis 07/27/2024    Anemia 07/12/2024    Aortic atherosclerosis     Arthritis     knee joint pain    Asthma-COPD overlap syndrome 08/22/2022    home o2    Breast cancer 2002    left breast & lymph nodes-s/p sx with chemo    Cataracts, bilateral     Chronic diastolic heart failure 12/24/2019    Chronic kidney disease, stage 3     Class 1 obesity due to excess calories with serious comorbidity and body mass index (BMI) of 30.0 to 30.9 in adult 05/16/2024    History of chemotherapy     last treatment 12/2002 (had 8 treatments)    Hyperlipidemia 11/20/2016    Hypertension     Lymphedema of both lower extremities 01/25/2022    Nephrolithiasis 06/02/2014    Osteoporosis     Paroxysmal atrial fibrillation 06/07/2021    Renal osteodystrophy 01/14/2016    Shock 4/6/2025    Subclinical hypothyroidism 4/6/2025    Venous stasis dermatitis of both lower extremities 01/25/2022    Vitamin D insufficiency      Past Surgical History:   Procedure Laterality Date    ABLATION, ATRIAL FLUTTER, TYPICAL N/A 6/17/2024    Procedure: Ablation, Atrial Flutter, Typical;  Surgeon: Taz Church MD;  Location: [facility] EP LAB;  Service: Cardiology;  Laterality: N/A;  AFL, RFA, LORENE, MAKAYLA (cx if SR), LEANNE miller, 3 Prep    BREAST BIOPSY Left 2002    core bx, +    BREAST BIOPSY Right 2018    core    BREAST BIOPSY Right 2019    BREAST LUMPECTOMY Left 2002    CYSTOSCOPY  4/28/2022    Procedure: CYSTOSCOPY;  Surgeon: Vik Ware MD;  Location: [facility] OR [address] [place] [address];  Service: Urology;;    CYSTOSCOPY  5/30/2022    Procedure: CYSTOSCOPY;  Surgeon: Bonnie Knutson MD;  Location: [facility] OR [address] [place] [address];  Service: Urology;;    ECHOCARDIOGRAM,TRANSESOPHAGEAL N/A 6/17/2024    Procedure: Transesophageal echo (MAKAYLA) intra-procedure log documentation;  Surgeon: Nestor Martinez MD;  Location:  [facility] EP LAB;  Service: Cardiology;  Laterality: N/A;    ERCP N/A 7/30/2024    Procedure: ERCP (ENDOSCOPIC RETROGRADE CHOLANGIOPANCREATOGRAPHY);  Surgeon: Sierra Cotto MD;  Location: [facility] ENDO (2ND FLR);  Service: Endoscopy;  Laterality: N/A;    IMPLANTATION OF LEADLESS PACEMAKER N/A 4/10/2025    Procedure: INSERTION, CARDIAC PACEMAKER, LEADLESS;  Surgeon: CAROLINE Solis MD;  Location: [facility] EP LAB;  Service: Cardiology;  Laterality: N/A;  CHB, MICRA leadless PPM, MDT, Anes, EH, CICU 3073    LASER LITHOTRIPSY  5/30/2022    Procedure: LITHOTRIPSY, USING LASER;  Surgeon: Bonnie Knutson MD;  Location: [facility] OR 1ST FLR;  Service: Urology;;    LITHOTRIPSY      MASTECTOMY Left 06/2002    left-& lymph node dissection    REMOVAL, PACEMAKER, TEMPORARY TRANSVENOUS  4/10/2025    Procedure: Removal, Pacemaker, Temporary Transvenous;  Surgeon: CAROLINE Solis MD;  Location: [facility] EP LAB;  Service: Cardiology;;    REPLACEMENT OF STENT Right 5/30/2022    Procedure: REPLACEMENT, STENT;  Surgeon: Bonnie Knutson MD;  Location: [facility] OR 1ST FLR;  Service: Urology;  Laterality: Right;    RETROGRADE PYELOGRAPHY Right 4/28/2022    Procedure: PYELOGRAM, RETROGRADE;  Surgeon: Vik Ware MD;  Location: [facility] OR 1ST FLR;  Service: Urology;  Laterality: Right;    ROBOT-ASSISTED LAPAROSCOPIC PARTIAL NEPHRECTOMY USING DA JESÚS XI Right 3/11/2021    Procedure: XI ROBOTIC NEPHRECTOMY, PARTIAL;  Surgeon: Taz Ortega MD;  Location: [facility] OR 2ND FLR;  Service: Urology;  Laterality: Right;  4hr/ gen with regional  [facility] confirmation:  [id number] for 11:15am case NC    URETEROSCOPIC REMOVAL OF URETERIC CALCULUS Right 5/30/2022    Procedure: REMOVAL, CALCULUS, URETER, URETEROSCOPIC;  Surgeon: Bonnie Knutson MD;  Location: [facility] OR 1ST FLR;  Service: Urology;  Laterality: Right;    ureteroscopy Bilateral     6.14    URETEROSCOPY Right 5/30/2022    Procedure: URETEROSCOPY;  Surgeon: Bonnie Knutson MD;   Location: [facility] OR [address] [place] [address];  Service: Urology;  Laterality: Right;     Family History       Problem Relation (Age of Onset)    Arthritis Mother, Sister    Bone cancer Mother    Breast cancer Mother    Cancer Father    Crohn's disease Daughter    Fibroids Daughter    No Known Problems Brother, Daughter          Tobacco Use    Smoking status: Former     Current packs/day: 0.00     Average packs/day: 1 pack/day for 50.0 years (50.0 ttl pk-yrs)     Types: Cigarettes     Start date: 1960     Quit date: 5/20/2009     Years since quitting: 15.9    Smokeless tobacco: Former   Substance and Sexual Activity    Alcohol use: No    Drug use: No    Sexual activity: Not Currently     Partners: Male     Birth control/protection: Partner-Vasectomy     Review of Systems  Objective:     Vital Signs (Most Recent):  Temp: 97.5 °F (36.4 °C) (04/15/25 1154)  Pulse: 72 (04/15/25 1154)  Resp: 18 (04/15/25 1154)  BP: 122/64 (04/15/25 1154)  SpO2: 96 % (04/15/25 1154) Vital Signs (24h Range):  Temp:  [97.4 °F (36.3 °C)-97.9 °F (36.6 °C)] 97.5 °F (36.4 °C)  Pulse:  [68-98] 72  Resp:  [18-20] 18  SpO2:  [81 %-96 %] 96 %  BP: (122-161)/(58-78) 122/64     Weight: 73.4 kg (161 lb 13.1 oz)  Body mass index is 27.78 kg/m².    Date 04/15/25 0700 - 04/16/25 0659   Shift 3504-4086 3513-6089 6542-4880 24 Hour Total   INTAKE   P.O. 360   360   Shift Total(mL/kg) 360(4.9)   360(4.9)   OUTPUT   Urine(mL/kg/hr) 300   300   Stool 0   0   Shift Total(mL/kg) 300(4.1)   300(4.1)   Weight (kg) 73.4 73.4 73.4 73.4        Physical Exam  Physical Exam    Vitals:    04/15/25 1154   BP: 122/64   Pulse: 72   Resp: 18   Temp: 97.5 °F (36.4 °C)     Body mass index is 27.78 kg/m².    General: AOx3, NAD  Nose: No gross nasal septal deviation.  Inferior Turbinates WNL bilaterally.  No septal perforation.  No masses/lesions.  Oral Cavity: FOM Soft, no masses palpated.  Oral Tongue mobile.  Hard Palate WNL.  Oropharynx: BOT WNL.  No masses/lesions noted.  Tonsillar fossa  without lesions.  Soft palate without masses.  Midline uvula.  Neck: No palpable lymphadenopathy at I - VI.    Face: House Brackmann I bilaterally.  Eyes: Normal extra ocular motion bilaterally.  Resp: breathing comfortably on home 2L NC  Abd: nontender, soft   Skin: normal color and tugor  Voice: breathy, weak  Neuro: alert and oriented       PROCEDURE: Flexible Laryngoscopy Exam  The risks, benefits, and alternatives to the procedure were explained. Patient/family  agreed to procedure; verbal consent obtained. The scope was inserted into the nasal cavity. Examination of the nasal cavity, nasopharynx, oropharynx, hypopharynx, and larynx was performed; all were closely visualized to confirm presence or absence of erythema, edema, or structural lesions.     Pertinent exam findings include the following:  - Nasal cavity: no masses or lesions, pink mucosa, no purulence  - Nasopharynx: no masses or lesions, emily wnl  - Oropharynx: no masses or lesions, tonsils WNL, BOT WNL  - Larynx and Hypopharynx: Mobility of left TVF with normal adduction and abduction, right TVC paralysis with mild hemorrhage true cord, adequate glottic closure     The scope was removed. The patient tolerated the procedure well without complications.                Significant Labs:  CBC:   Recent Labs   Lab 04/15/25  0445   WBC 7.11   RBC 4.54   HGB 12.9   HCT 40.0      MCV 88   MCH 28.4   MCHC 32.3     CMP:   Recent Labs   Lab 04/15/25  0446   CALCIUM 11.1*   ALBUMIN 2.4*      K 3.6   CO2 21*   *   BUN 40*   CREATININE 1.1   ALKPHOS 103   ALT 14   AST 25   BILITOT 0.6       Significant Diagnostics:  None

## 2025-04-15 NOTE — SUBJECTIVE & OBJECTIVE
Interval History:   NAEON & remains HDS. Continued hoarseness post-extubation, otherwise no complaints. Na elevated this AM. 250cc LR bolus ordered. Otherwise remains medically ready for discharge.       Review of Systems  Objective:     Vital Signs (Most Recent):  Temp: 97.5 °F (36.4 °C) (04/15/25 0750)  Pulse: 79 (04/15/25 0750)  Resp: 18 (04/15/25 0750)  BP: (!) 148/64 (04/15/25 0750)  SpO2: 95 % (04/15/25 0750) Vital Signs (24h Range):  Temp:  [97.4 °F (36.3 °C)-97.9 °F (36.6 °C)] 97.5 °F (36.4 °C)  Pulse:  [68-89] 79  Resp:  [18-20] 18  SpO2:  [81 %-95 %] 95 %  BP: (132-161)/(58-78) 148/64     Weight: 73.4 kg (161 lb 13.1 oz)  Body mass index is 27.78 kg/m².    Intake/Output Summary (Last 24 hours) at 4/15/2025 1059  Last data filed at 4/15/2025 0943  Gross per 24 hour   Intake 602 ml   Output 700 ml   Net -98 ml         Physical Exam  Constitutional:       General: She is not in acute distress.     Appearance: She is not toxic-appearing or diaphoretic.   HENT:      Head: Normocephalic and atraumatic.      Mouth/Throat:      Mouth: Mucous membranes are moist.   Eyes:      General: No scleral icterus.     Conjunctiva/sclera: Conjunctivae normal.   Cardiovascular:      Rate and Rhythm: Normal rate and regular rhythm.      Heart sounds: Normal heart sounds. No murmur heard.  Pulmonary:      Effort: Pulmonary effort is normal. No respiratory distress.      Breath sounds: Normal breath sounds. No wheezing, rhonchi or rales.   Abdominal:      General: Abdomen is flat. Bowel sounds are normal. There is no distension.      Palpations: Abdomen is soft.      Tenderness: There is no abdominal tenderness. There is no guarding.   Musculoskeletal:         General: No signs of injury.      Right lower leg: No edema.      Left lower leg: No edema.   Skin:     General: Skin is warm and dry.   Neurological:      General: No focal deficit present.      Mental Status: She is alert and oriented to person, place, and time. Mental  status is at baseline.   Psychiatric:         Mood and Affect: Mood normal.         Behavior: Behavior normal.             Significant Labs: All pertinent labs within the past 24 hours have been reviewed.    Significant Imaging: I have reviewed all pertinent imaging results/findings within the past 24 hours.

## 2025-04-15 NOTE — ASSESSMENT & PLAN NOTE
80 yo female intubated 4/6-4/8 due to acute on chronic hypoxic/ hypercapnic respiratory failure and obtunded. Extubated 4/8 without difficulty. Patient has been dysphonia since extubation- voice weak and breathy. Stable on home O2 2L NC. Patient not bothered by voice. SLP cleared patient for regular diet. Patient denies any clinical signs aspiration- no coughing while swallowing any consistencies including thin liquids.

## 2025-04-15 NOTE — HPI
78 yo female hx COPD (on home O2), HFpEF, AFL s/p RFA (6/17/24 w/ Dr. Church), CKD III, HTN, HLD, chronic venous insufficiency, orthostatic hypotension BIBEMS after she was found confused and bradycardiac with HR in the 20. ENT consulted for evaluation of dysphonia.     Patient was admitted to cardiac ICU and intubated due to acute on chronic hypoxic/ hypercapnic respiratory failure and obtunded. Extubated 4/8 without difficulty. Patient has been dysphonia since extubation- voice weak and breathy. Stable on home O2 2L NC. Patient not bothered by voice. SLP cleared patient for regular diet. Patient denies any clinical signs aspiration- no coughing while swallowing any consistencies including thin liquids.

## 2025-04-15 NOTE — PLAN OF CARE
Unable to get accurate urine output d/t pt urinated when had BM x1. VSS. BG monitored. K 3.7 this AM, EKG done showing normal sinus rhythm with 1 st degree AV block and RBBB, made  aware. Pt educated on fall risk and remained free from falls/trauma/injury. Denies chest pain, SOB, palpitations, dizziness, pain, or discomfort. Plan of care reviewed with pt, all questions answered. Bed locked in lowest position, call bell within reach, no acute distress noted, will continue to monitor.

## 2025-04-15 NOTE — ASSESSMENT & PLAN NOTE
MARQUEZ is likely due to MARQUEZ on CKD. Baseline creatinine is 1.1. Most recent creatinine and eGFR are listed below.  Recent Labs     04/13/25  0901 04/14/25  0500 04/15/25  0446   CREATININE 1.4 1.2 1.1   EGFRNORACEVR 38* 46* 51*      Plan  - MARQUEZ is stable  - Avoid nephrotoxins and renally dose meds for GFR listed above  - Monitor urine output, serial BMP, and adjust therapy as needed

## 2025-04-15 NOTE — ASSESSMENT & PLAN NOTE
Creatine stable for now. BMP reviewed- noted Estimated Creatinine Clearance: 39.4 mL/min (based on SCr of 1.1 mg/dL). according to latest data. Based on current GFR, CKD stage is stage 4 - GFR 15-29.  Monitor UOP and serial BMP and adjust therapy as needed. Renally dose meds. Avoid nephrotoxic medications and procedures.

## 2025-04-15 NOTE — PLAN OF CARE
SW informed by Dr Cantu that he met with pt and daughter and pt wants to discharge home with HH.  SW attempted to meet with pt and family at bedside to discuss but pt was sleeping and no family was there.  Will attempt again later.      Karey Castelan LMSW  [facility]  [phone]

## 2025-04-15 NOTE — ASSESSMENT & PLAN NOTE
Anemia is likely due to chronic disease due to Chronic Kidney Disease. Most recent hemoglobin and hematocrit are listed below.  Recent Labs     04/13/25  0901 04/14/25  0500 04/15/25  0445   HGB 11.4* 12.3 12.9   HCT 35.8* 38.5 40.0     Plan  - Monitor serial CBC: Daily  - Transfuse PRBC if patient becomes hemodynamically unstable, symptomatic or H/H drops below 7/21.  - Patient has not received any PRBC transfusions to date  - Patient's anemia is currently stable

## 2025-04-15 NOTE — PT/OT/SLP PROGRESS
Physical Therapy Co-Treatment    OT present for cotreat due to pt's multiple medical comorbidities and functional/cognition deficits requiring two skilled therapists to appropriately progress pt's musculoskeletal strength, neuromuscular control, and endurance while taking into consideration medical acuity and pt safety.    Patient Name:  Misa Barajas   MRN:  [id number]    Recommendations:     Discharge Recommendations: Moderate Intensity Therapy  Discharge Equipment Recommendations: walker, rolling, bedside commode  Barriers to discharge: None    Assessment:     Misa Barajas is a 79 y.o. female admitted with a medical diagnosis of Complete heart block.  She presents with the following impairments/functional limitations: weakness, impaired endurance, impaired self care skills, impaired functional mobility, gait instability, impaired balance, impaired cardiopulmonary response to activity, decreased lower extremity function, decreased safety awareness     Pt receptive and tolerated PT co-treatment with OT fairly well. Pt able to amb to bedside chair this session, however requires 2 person assistance and RW to complete safely. Pt reported increased SOB and demonstrated increased WOB and fatigue with bed mobility and with amb needing increased time to recover. Patient continues to demonstrate the need for moderate intensity therapy on a daily basis post acute exhibited by decreased independence with self-care and functional mobility    Rehab Prognosis: Good; patient would benefit from acute skilled PT services to address these deficits and reach maximum level of function.    Recent Surgery: Procedure(s) (LRB):  INSERTION, CARDIAC PACEMAKER, LEADLESS (N/A)  Removal, Pacemaker, Temporary Transvenous 5 Days Post-Op    Plan:     During this hospitalization, patient to be seen 4 x/week to address the identified rehab impairments via gait training, therapeutic activities, therapeutic exercises, neuromuscular re-education  and progress toward the following goals:    Plan of Care Expires:  05/13/25    Subjective     Chief Complaint: SOB and fatigue with mobility  Patient/Family Comments/goals: Pt wanting to returning home  Pain/Comfort:  Pain Rating 1: 0/10  Pain Rating Post-Intervention 1: 0/10      Objective:     Communicated with RN prior to session.  Patient found HOB elevated with oxygen, telemetry, PureWick upon PT entry to room.     General Precautions: Standard, fall, aspiration  Orthopedic Precautions: N/A  Braces: N/A  Respiratory Status: Nasal cannula, flow 3 L/min     Functional Mobility:    Bed Mobility:   Rolling: to R with contact guard assistance  Supine > Sit: contact guard assistance    Transfers:   Sit <> Stand Transfer: minimum assistance and of 2 persons from EOB using rolling walker   Bed <> Chair: moderate assistance and of 2 persons using rolling walker     Balance:   Sitting balance: FAIR+: Maintains balance through MINIMAL excursions of active trunk motion  Standing balance:   POOR+: Needs MINIMAL assist to maintain  POOR: Needs MOD (moderate) assist during gait                 Gait:  Distance: ~6 steps to bedside chair   Assistive Device: RW  Assistance Level: moderate assistance and of 2 persons  Gait Assessment: Pt amb with small steps, decreased renae foot clearance, and forward flexed posture.  Pt needed verbal cues for direction and manual cues for RW management      AM-PAC 6 CLICK MOBILITY  Turning over in bed (including adjusting bedclothes, sheets and blankets)?: 3  Sitting down on and standing up from a chair with arms (e.g., wheelchair, bedside commode, etc.): 2  Moving from lying on back to sitting on the side of the bed?: 3  Moving to and from a bed to a chair (including a wheelchair)?: 2  Need to walk in hospital room?: 2  Climbing 3-5 steps with a railing?: 1  Basic Mobility Total Score: 13       Treatment & Education:  Pt educated on tip to reduce fall risk and safety with mobility and using call  button for assistance from nursing staff with OOB mobility.  Pt educated on sitting up in chair throughout most of day  All questions answered within the scope of PT.  White board updated accordingly.    Patient left up in chair with all lines intact, call button in reach, RN notified, and daughter present..    GOALS:   Multidisciplinary Problems       Physical Therapy Goals          Problem: Physical Therapy    Goal Priority Disciplines Outcome Interventions   Physical Therapy Goal     PT, PT/OT Progressing    Description: PT goals until 4/24/25    1. Pt supine to sit with CGA-not met  2. Pt sit to supine with CGA-not met  3. Pt sit to stand with RW with CGA-not met  4. Pt to perform gait 10ft with RW with CGA.-not met  5. Pt to transfer bed to/from bedside chair with HHA with minimal assist.-not met  6. Pt to up/down 1 step with RW with minimal assist.-not met  7. Pt to perform B LE exs in sitting or supine x 10 reps to strengthen B LE to improve functional mobility.-not met    DME Justifications (see above for complete DME recommendations)    Bedside Commode- Patient has a mobility limitation that significantly impairs their ability to participate in one or more mobility related activities of daily living, including toileting. This deficit can be resolved by using a bedside commode. Patient demonstrates mobility limitations that will cause them to be confined to one room at home without bathroom access for up to 30 days. Using a bedside commode will greatly improve the patient's ability to participate in MRADLs.     Rolling Walker- Patient demonstrates a mobility limitation that significantly impairs their ability to participate in one or more mobility related activities of daily living. Patient's mobility limitation cannot be sufficiently resolved with the use of a cane, but can be sufficiently resolved with the use of a rolling walker.The use of a rolling walker will considerably improve their ability to  participate in MRADLs. Patient will use the walker on a regular basis at home.                              DME Justifications:   Misa requires a commode for home use because she is confined to a single room.   Misa's mobility limitation cannot be sufficiently resolved by the use of a cane. Her functional mobility deficit can be sufficiently resolved with the use of a Rolling Walker. Patient's mobility limitation significantly impairs their ability to participate in one of more activities of daily living.  The use of a RW will significantly improve the patient's ability to participate in MRADLS and the patient will use it on regular basis in the home.    Time Tracking:     PT Received On: 04/15/25  PT Start Time: 1004     PT Stop Time: 1025  PT Total Time (min): 21 min     Billable Minutes: Gait Training 11    Treatment Type: Treatment  PT/PTA: PT     Number of PTA visits since last PT visit: 0     04/15/2025

## 2025-04-15 NOTE — ASSESSMENT & PLAN NOTE
"Most recent BNP and echo results are listed below.  No results for input(s): "BNP" in the last 72 hours.     Echo  Result Date: 4/7/2025    Left Ventricle: The left ventricle is normal in size. Normal wall   thickness. There is low normal systolic function with a visually estimated   ejection fraction of 50 - 55%. Grade II diastolic dysfunction.    Right Ventricle: The right ventricle is normal in size. Wall thickness   is normal. Systolic function is normal.    IVC/SVC: Patient is ventilated, cannot use IVC diameter to estimate   right atrial pressure.            Current Heart Failure Medications: have been held due to bradycardia and MARQUEZ     Plan  - Monitor strict I&Os and daily weights.    - Place on telemetry  - resolved, switched to home bumex    "

## 2025-04-15 NOTE — ASSESSMENT & PLAN NOTE
78 yo female with right vocal cord paralysis s/p intubation 4/6-4/8. Voice weak and breathy, patient not bothered by voice. SLP cleared for regular diet. Flexible laryngoscopy demonstrates right TVF paralysis with hemorrhage of true cord, adequate glottic closure. Patient denies aspiration.     - No acute ENT intervention  - Patient uninterested in vocal fold augmentation at this time. Patient can follow up outpatient if patient becomes bothered by voice, has any concerns for aspiration   - Recommend outpatient SLP follow up        Please page ENT resident on call with any questions or concerns.

## 2025-04-15 NOTE — PLAN OF CARE
[facility] Cardiology Stepdown  Discharge Reassessment    Primary Care Provider: Isela Langston MD    Expected Discharge Date: 4/17/2025    Reassessment (most recent)       Discharge Reassessment - 04/15/25 1551          Discharge Reassessment    Assessment Type Discharge Planning Reassessment     Did the patient's condition or plan change since previous assessment? Yes     Discharge Plan discussed with: Patient;Adult children     Communicated NOHEMI with patient/caregiver Date not available/Unable to determine     Discharge Plan A Skilled Nursing Facility     Discharge Plan B Home Health     DME Needed Upon Discharge  none     Transition of Care Barriers None     Why the patient remains in the hospital Requires continued medical care        Post-Acute Status    Post-Acute Authorization Placement     Post-Acute Placement Status Referrals Sent                   SW met with pt and daughter Esther at bedside to confirm plan to discharge home with [facility] but pt stated that they wanted to go to rehab.      Patient/family provided list of facilities in-network with patient's payor plan. Providers that are owned, operated, or affiliated with [facility] are included on the list.     Notified that referral sent to below listed facilities from in-network list based on proximity to home/family support:   [facility]    Patient/family instructed to identify preference.    Preferred Facility: (if more than 1, listed in order of descending preference)  [facility]    If an additional preferred facility not listed above is identified, additional referral to be sent. If above facilities unable to accept, will send additional referrals to in-network providers.     Discharge Plan A and Plan B have been determined by review of patient's clinical status, future medical and therapeutic needs, and coverage/benefits for post-acute care in coordination with multidisciplinary  team members.  Will continue to follow.      Karey Castelan LMSW  [facility]  [id number]

## 2025-04-15 NOTE — PROGRESS NOTES
[facility] - Cardiology [facility] Medicine  Progress Note    Patient Name: Misa Barajas  MRN: [id number]  Patient Class: IP- Inpatient   Admission Date: 4/6/2025  Length of Stay: 9 days  Attending Physician: Reinaldo Cantu DO  Primary Care Provider: Isela Langston MD        Subjective     Principal Problem:Complete heart block        HPI:  Ms. Misa Barajas is a 80 yo female with COPD on 2L home O2, HFpEF, AFL s/p RFA (6/17/24 w/ Dr. Church), CKD III, HTN, HLD, chronic venous insufficiency, orthostatic hypotension who was brought via EMS after per history she was found confused and bradycardiac with HR in the 20s.They started epi and transcutaneous pacing. Given Versed for the transcutaneous pacing. They had to bag her in route. We do not have strip of underlying rhythm. Patient obtunded for which she was intubated for airway protection on arrival to the ED and transcutaneous pacing was exchanged to transvenous pacing via RIJ (threshold 0.6). Underlying rate 44 bifascicular block. Known RBBB. Per daughters patient had called them over the phone when they noticed she appeared confused. Due to concerns for a possible syncopal episode EMS was called. Prior to event patient had been complaining of malaise and nausea for which she took about two Zofran pills per the daughters.      She had been recently seen on 3/27/25 at the [facility] visit following recent admission for AHRF 2/2 ADHF and COPD exacerbation. On ED presentation BNP elevated to 2500, troponin elevated to 19, CXR with pulmonary edema. She was started on steroids, nebs and antibiotics in addition to IV diuresis. Updated TTE stable from prior, showing HFpEF with elevated CVP 15. Her O2 requirements improved to baseline with diuresis and she was subsequently discharged. At that visit patient had not required midodrine as blood pressure had remained stable and she was asymptomatic.      Off note, patient was taken off OAC due to bleeding issues and  no recurrent of typical atrial flutter after ablation.     Overview/Hospital Course:  Admitted to CCU for CHB in s/o hyperkalemia & in the presence of conduction disease/RBBB & LAHB - stable w/ temporary wire. Septic shock d/t UTI; vasopressors weaned off. HTN; started NG gtt. Acute on chronic hypoxic/hypercapnic RF & obtunded; intubated. Successfully extubated 04/08. MARQUEZ on CKD; improved w/ good UOP response to diuresis. PVCs w/ intermittent pacing by TVP. Switched pip-tazo to amox-clav on 04/09; continued to improve; empiric EED 04/13 for 7 day course. Micra PM by EP placed 04/10. Medically stable for stepdown. Patient was extremely short of breath with physical therapy, chest x-ray concerning for pulmonary edema.  Started patient on IV diuresis, now euvolemic.  Not agreeable for discharge to facility, plan to d/c with HH when ready    Interval History: Seen this AM at bedside.  No acute events reported overnight.  Straight cathed x1 overnight.  No complaints other than persistent hoarseness since extubation.  Denies shortness or breath.  States that she would like to go home    Dtr Marina updated at bedside    Review of Systems  Objective:     Vital Signs (Most Recent):  Temp: 97.5 °F (36.4 °C) (04/15/25 0750)  Pulse: 79 (04/15/25 0750)  Resp: 18 (04/15/25 0750)  BP: (!) 148/64 (04/15/25 0750)  SpO2: 95 % (04/15/25 0750) Vital Signs (24h Range):  Temp:  [97.4 °F (36.3 °C)-97.9 °F (36.6 °C)] 97.5 °F (36.4 °C)  Pulse:  [68-89] 79  Resp:  [18-20] 18  SpO2:  [81 %-95 %] 95 %  BP: (132-161)/(58-78) 148/64     Weight: 73.4 kg (161 lb 13.1 oz)  Body mass index is 27.78 kg/m².    Intake/Output Summary (Last 24 hours) at 4/15/2025 1059  Last data filed at 4/15/2025 0943  Gross per 24 hour   Intake 602 ml   Output 700 ml   Net -98 ml         Physical Exam  Vitals and nursing note reviewed.   Constitutional:       General: She is not in acute distress.     Appearance: She is not ill-appearing.   HENT:      Head:  Normocephalic and atraumatic.      Right Ear: External ear normal.      Left Ear: External ear normal.      Mouth/Throat:      Mouth: Mucous membranes are moist.      Pharynx: No oropharyngeal exudate.   Eyes:      General: No scleral icterus.     Extraocular Movements: Extraocular movements intact.      Pupils: Pupils are equal, round, and reactive to light.   Cardiovascular:      Rate and Rhythm: Normal rate.      Heart sounds: Normal heart sounds. No murmur heard.     Comments: paced rhythm  Pulmonary:      Effort: Pulmonary effort is normal. No respiratory distress.      Breath sounds: Normal breath sounds. No wheezing or rales.   Abdominal:      General: Abdomen is flat. Bowel sounds are normal. There is no distension.      Palpations: Abdomen is soft.      Tenderness: There is no abdominal tenderness.   Musculoskeletal:         General: No signs of injury. Normal range of motion.      Cervical back: Normal range of motion.      Right lower leg: No edema.      Left lower leg: No edema.   Skin:     General: Skin is warm and dry.   Neurological:      General: No focal deficit present.      Mental Status: She is alert.      Sensory: No sensory deficit.      Motor: No weakness.   Psychiatric:         Mood and Affect: Mood normal.         Behavior: Behavior normal.         Thought Content: Thought content normal.               Significant Labs: All pertinent labs within the past 24 hours have been reviewed.    Significant Imaging: I have reviewed all pertinent imaging results/findings within the past 24 hours.      Assessment & Plan  Complete heart block  RBBB  Shock  -Known RBBB. Admitted with CHB and shock.  Bradycardic with HR in the 20s on arrival, transcutaneous pacing/epi initiated, admitted to CCU  - bradycardia most likely in the setting of electrolyte disturbance and abnormal thyroid function  Plan:  -s/p micra PM placement 4/10 by EP  - correct electrolyte derangements  -f/u with EP        Hoarseness of  "voice  -Persistent hoarseness since extubation on 4/8  -ENT consulted for scope eval    Typical atrial flutter  -s/p ablation 6/2024 without recurrence  -AC discontinued by outpatient EP provider  -Cont BB    CKD (chronic kidney disease) stage 3, GFR 30-59 ml/min  Creatine stable for now. BMP reviewed- noted Estimated Creatinine Clearance: 39.4 mL/min (based on SCr of 1.1 mg/dL). according to latest data. Based on current GFR, CKD stage is stage 4 - GFR 15-29.  Monitor UOP and serial BMP and adjust therapy as needed. Renally dose meds. Avoid nephrotoxic medications and procedures.    Centrilobular emphysema  Prior history of smoking. Continue scheduled inhalers once extubated. Antibiotics and Supplemental oxygen and monitor respiratory status closely.   Chronic respiratory failure with hypoxia  Patient with Hypoxic Respiratory failure which is Chronic.  she is on home oxygen at 2 LPM. Supplemental oxygen was provided and noted-       Signs/symptoms of respiratory failure include- tachypnea, increased work of breathing, use of accessory muscles, and lethargy. Contributing diagnoses includes - underlying COPD   -transition to home bumex  MARQUEZ (acute kidney injury)  MARQUEZ is likely due to  MARQUEZ on CKD . Baseline creatinine is  1.1 . Most recent creatinine and eGFR are listed below.  Recent Labs     04/13/25  0901 04/14/25  0500 04/15/25  0446   CREATININE 1.4 1.2 1.1   EGFRNORACEVR 38* 46* 51*      Plan  - MARQUEZ is stable  - Avoid nephrotoxins and renally dose meds for GFR listed above  - Monitor urine output, serial BMP, and adjust therapy as needed    Acute on chronic congestive heart failure  Most recent BNP and echo results are listed below.  No results for input(s): "BNP" in the last 72 hours.     Echo  Result Date: 4/7/2025    Left Ventricle: The left ventricle is normal in size. Normal wall   thickness. There is low normal systolic function with a visually estimated   ejection fraction of 50 - 55%. Grade II diastolic " dysfunction.    Right Ventricle: The right ventricle is normal in size. Wall thickness   is normal. Systolic function is normal.    IVC/SVC: Patient is ventilated, cannot use IVC diameter to estimate   right atrial pressure.            Current Heart Failure Medications: have been held due to bradycardia and MARQUEZ     Plan  - Monitor strict I&Os and daily weights.    - Place on telemetry  - resolved, switched to home bumex    Orthostatic hypotension    holding midodrine and per history patient had not required it since last hospitalization   Iron deficiency anemia due to chronic blood loss  Anemia is likely due to chronic disease due to Chronic Kidney Disease. Most recent hemoglobin and hematocrit are listed below.  Recent Labs     04/13/25  0901 04/14/25  0500 04/15/25  0445   HGB 11.4* 12.3 12.9   HCT 35.8* 38.5 40.0     Plan  - Monitor serial CBC: Daily  - Transfuse PRBC if patient becomes hemodynamically unstable, symptomatic or H/H drops below 7/21.  - Patient has not received any PRBC transfusions to date  - Patient's anemia is currently stable    Subclinical hypothyroidism        VTE Risk Mitigation (From admission, onward)           Ordered     IP VTE HIGH RISK PATIENT  Once         04/06/25 0512     Place sequential compression device  Until discontinued         04/06/25 0454                    Discharge Planning   NOHEMI: 4/15/2025     Code Status: Full Code   Medical Readiness for Discharge Date:   Discharge Plan A: Home Health                Please place Justification for DME        Reinaldo Cantu DO  Department of Hospital Medicine   [facility] - Cardiology Stepdown

## 2025-04-16 NOTE — ASSESSMENT & PLAN NOTE
-Persistent hoarseness since extubation on 4/8  -ENT consulted, status post laryngoscopy 4/15 which noted right TDC paralysis with mild hemorrhage of true cord, adequate glottic closure noted  -Pt uninterested in VC augmentation at this time, needs outpatient SLP follow up

## 2025-04-16 NOTE — PROGRESS NOTES
[facility] - Cardiology [facility] Medicine  Progress Note    Patient Name: Misa Barajas  MRN: [id number]  Patient Class: IP- Inpatient   Admission Date: 4/6/2025  Length of Stay: 10 days  Attending Physician: Reinaldo Cantu DO  Primary Care Provider: Iseal Langston MD        Subjective     Principal Problem:Complete heart block        HPI:  Ms. Misa Barajas is a 80 yo female with COPD on 2L home O2, HFpEF, AFL s/p RFA (6/17/24 w/ Dr. Church), CKD III, HTN, HLD, chronic venous insufficiency, orthostatic hypotension who was brought via EMS after per history she was found confused and bradycardiac with HR in the 20s.They started epi and transcutaneous pacing. Given Versed for the transcutaneous pacing. They had to bag her in route. We do not have strip of underlying rhythm. Patient obtunded for which she was intubated for airway protection on arrival to the ED and transcutaneous pacing was exchanged to transvenous pacing via RIJ (threshold 0.6). Underlying rate 44 bifascicular block. Known RBBB. Per daughters patient had called them over the phone when they noticed she appeared confused. Due to concerns for a possible syncopal episode EMS was called. Prior to event patient had been complaining of malaise and nausea for which she took about two Zofran pills per the daughters.      She had been recently seen on 3/27/25 at the [facility] visit following recent admission for AHRF 2/2 ADHF and COPD exacerbation. On ED presentation BNP elevated to 2500, troponin elevated to 19, CXR with pulmonary edema. She was started on steroids, nebs and antibiotics in addition to IV diuresis. Updated TTE stable from prior, showing HFpEF with elevated CVP 15. Her O2 requirements improved to baseline with diuresis and she was subsequently discharged. At that visit patient had not required midodrine as blood pressure had remained stable and she was asymptomatic.      Off note, patient was taken off OAC due to bleeding issues  and no recurrent of typical atrial flutter after ablation.     Overview/Hospital Course:  Admitted to CCU for CHB in s/o hyperkalemia & in the presence of conduction disease/RBBB & LAHB - stable w/ temporary wire. Septic shock d/t UTI; vasopressors weaned off. HTN; started NG gtt. Acute on chronic hypoxic/hypercapnic RF & obtunded; intubated. Successfully extubated 04/08. MARQUEZ on CKD; improved w/ good UOP response to diuresis. PVCs w/ intermittent pacing by TVP. Switched pip-tazo to amox-clav on 04/09; continued to improve; empiric EED 04/13 for 7 day course. Micra PM by EP placed 04/10. Medically stable for stepdown. Patient was extremely short of breath with physical therapy, chest x-ray concerning for pulmonary edema.  Started patient on IV diuresis, now euvolemic.  ENT was consulted yesterday due to hoarseness of voice.  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 Pt uninterested in any procedure at this time, ENT recommended SLP follow up.  Able to tolerate regular diet at this point.  Now agreeable for SNF, working on placement. Medically ready for d/c      Interval History: Seen this AM at bedside.  No acute events reported overnight. Sitting upright in chair eating breakfast.  Continued hoarseness post-extubation. Otherwise, no complaints    Dtr Marina updated over the phone    Review of Systems  Objective:     Vital Signs (Most Recent):  Temp: 97.5 °F (36.4 °C) (04/15/25 0750)  Pulse: 79 (04/15/25 0750)  Resp: 18 (04/15/25 0750)  BP: (!) 148/64 (04/15/25 0750)  SpO2: 95 % (04/15/25 0750) Vital Signs (24h Range):  Temp:  [97.4 °F (36.3 °C)-97.9 °F (36.6 °C)] 97.5 °F (36.4 °C)  Pulse:  [68-89] 79  Resp:  [18-20] 18  SpO2:  [81 %-95 %] 95 %  BP: (132-161)/(58-78) 148/64     Weight: 73.4 kg (161 lb 13.1 oz)  Body mass index is 27.78 kg/m².    Intake/Output Summary (Last 24 hours) at 4/15/2025 1059  Last data filed at 4/15/2025 0943  Gross per 24 hour   Intake 602 ml    Output 700 ml   Net -98 ml         Physical Exam  Vitals and nursing note reviewed.   Constitutional:       General: She is not in acute distress.     Appearance: She is not ill-appearing.   HENT:      Head: Normocephalic and atraumatic.      Right Ear: External ear normal.      Left Ear: External ear normal.      Mouth/Throat:      Mouth: Mucous membranes are moist.      Pharynx: No oropharyngeal exudate.   Eyes:      General: No scleral icterus.     Extraocular Movements: Extraocular movements intact.      Pupils: Pupils are equal, round, and reactive to light.   Cardiovascular:      Rate and Rhythm: Normal rate.      Heart sounds: Normal heart sounds. No murmur heard.     Comments: paced rhythm  Pulmonary:      Effort: Pulmonary effort is normal. No respiratory distress.      Breath sounds: Normal breath sounds. No wheezing or rales.   Abdominal:      General: Abdomen is flat. Bowel sounds are normal. There is no distension.      Palpations: Abdomen is soft.      Tenderness: There is no abdominal tenderness.   Musculoskeletal:         General: No signs of injury. Normal range of motion.      Cervical back: Normal range of motion.      Right lower leg: No edema.      Left lower leg: No edema.   Skin:     General: Skin is warm and dry.   Neurological:      General: No focal deficit present.      Mental Status: She is alert.      Sensory: No sensory deficit.      Motor: No weakness.   Psychiatric:         Mood and Affect: Mood normal.         Behavior: Behavior normal.         Thought Content: Thought content normal.               Significant Labs: All pertinent labs within the past 24 hours have been reviewed.    Significant Imaging: I have reviewed all pertinent imaging results/findings within the past 24 hours.      Assessment & Plan  Complete heart block  RBBB  Shock  -Known RBBB. Admitted with CHB and shock.  Bradycardic with HR in the 20s on arrival, transcutaneous pacing/epi initiated, admitted to CCU  -  bradycardia most likely in the setting of electrolyte disturbance and abnormal thyroid function  Plan:  -s/p micra PM placement 4/10 by EP  - correct electrolyte derangements  -f/u with EP  -CM working on placement to SNF      Dysphonia  Paralysis of right vocal cord  -Persistent hoarseness since extubation on 4/8  -ENT consulted, status post laryngoscopy 4/15 which noted right TDC paralysis with mild hemorrhage of true cord, adequate glottic closure noted  -Pt uninterested in VC augmentation at this time, needs outpatient SLP follow up        Typical atrial flutter  -s/p ablation 6/2024 without recurrence  -AC discontinued by outpatient EP provider  -Cont BB    CKD (chronic kidney disease) stage 3, GFR 30-59 ml/min  Creatine stable for now. BMP reviewed- noted Estimated Creatinine Clearance: 39.4 mL/min (based on SCr of 1.1 mg/dL). according to latest data. Based on current GFR, CKD stage is stage 4 - GFR 15-29.  Monitor UOP and serial BMP and adjust therapy as needed. Renally dose meds. Avoid nephrotoxic medications and procedures.    Centrilobular emphysema  Prior history of smoking. Continue scheduled inhalers once extubated. Antibiotics and Supplemental oxygen and monitor respiratory status closely.   Chronic respiratory failure with hypoxia  Patient with Hypoxic Respiratory failure which is Chronic.  she is on home oxygen at 2 LPM. Supplemental oxygen was provided and noted-       Signs/symptoms of respiratory failure include- tachypnea, increased work of breathing, use of accessory muscles, and lethargy. Contributing diagnoses includes - underlying COPD   -transition to home bumex  MARQUEZ (acute kidney injury)  MARQUEZ is likely due to  MARQUEZ on CKD . Baseline creatinine is  1.1 . Most recent creatinine and eGFR are listed below.  Recent Labs     04/14/25  0500 04/15/25  0446 04/16/25  0521   CREATININE 1.2 1.1 1.1   EGFRNORACEVR 46* 51* 51*      Plan  - MARQUEZ is stable  - Avoid nephrotoxins and renally dose meds for  "GFR listed above  - Monitor urine output, serial BMP, and adjust therapy as needed    Acute on chronic congestive heart failure  Most recent BNP and echo results are listed below.  No results for input(s): "BNP" in the last 72 hours.     Echo  Result Date: 4/7/2025    Left Ventricle: The left ventricle is normal in size. Normal wall   thickness. There is low normal systolic function with a visually estimated   ejection fraction of 50 - 55%. Grade II diastolic dysfunction.    Right Ventricle: The right ventricle is normal in size. Wall thickness   is normal. Systolic function is normal.    IVC/SVC: Patient is ventilated, cannot use IVC diameter to estimate   right atrial pressure.            Current Heart Failure Medications: have been held due to bradycardia and MARQUEZ     Plan  - Monitor strict I&Os and daily weights.    - Place on telemetry  - resolved, switched to home bumex    Orthostatic hypotension    holding midodrine and per history patient had not required it since last hospitalization   Iron deficiency anemia due to chronic blood loss  Anemia is likely due to chronic disease due to Chronic Kidney Disease. Most recent hemoglobin and hematocrit are listed below.  Recent Labs     04/14/25  0500 04/15/25  0445 04/16/25  0521   HGB 12.3 12.9 12.1   HCT 38.5 40.0 37.6     Plan  - Monitor serial CBC: Daily  - Transfuse PRBC if patient becomes hemodynamically unstable, symptomatic or H/H drops below 7/21.  - Patient has not received any PRBC transfusions to date  - Patient's anemia is currently stable    Subclinical hypothyroidism        VTE Risk Mitigation (From admission, onward)           Ordered     IP VTE HIGH RISK PATIENT  Once         04/06/25 0512     Place sequential compression device  Until discontinued         04/06/25 0454                    Discharge Planning   NOHEMI: 4/17/2025     Code Status: Full Code   Medical Readiness for Discharge Date: 4/16/2025  Discharge Plan A: Skilled Nursing Facility    "             Please place Justification for DME        Reinaldo Cantu DO  Department of Hospital Medicine   [facility] - Cardiology Stepdown

## 2025-04-16 NOTE — PROGRESS NOTES
[facility] - Cardiology Stepdown  Wound Care    Patient Name:  Misa Barajas   MRN:  [id number]  Date: 4/16/2025  Diagnosis: Complete heart block    History:     Past Medical History:   Diagnosis Date    Acute biliary pancreatitis 07/27/2024    Anemia 07/12/2024    Aortic atherosclerosis     Arthritis     knee joint pain    Asthma-COPD overlap syndrome 08/22/2022    home o2    Breast cancer 2002    left breast & lymph nodes-s/p sx with chemo    Cataracts, bilateral     Chronic diastolic heart failure 12/24/2019    Chronic kidney disease, stage 3     Class 1 obesity due to excess calories with serious comorbidity and body mass index (BMI) of 30.0 to 30.9 in adult 05/16/2024    History of chemotherapy     last treatment 12/2002 (had 8 treatments)    Hyperlipidemia 11/20/2016    Hypertension     Lymphedema of both lower extremities 01/25/2022    Nephrolithiasis 06/02/2014    Osteoporosis     Paroxysmal atrial fibrillation 06/07/2021    Renal osteodystrophy 01/14/2016    Shock 4/6/2025    Subclinical hypothyroidism 4/6/2025    Venous stasis dermatitis of both lower extremities 01/25/2022    Vitamin D insufficiency        Social History[1]    Precautions:     Allergies as of 04/06/2025 - Reviewed 04/06/2025   Allergen Reaction Noted    Adhesive tape-silicones  03/04/2024    Adhesive Rash 05/30/2014       [employer] [employer] Assessment Details/Treatment   Patient seen for wound care consultation.  Chart reviewed for this encounter.  See flow sheet for findings.    Pt up in the chair and agreeable to care. Pt able to stand with moderate assistance for assessment. The buttocks are intact, pink, and blanchable - likely related to moisture associated dermatitis. The groin is intact, pink and moist. Fungal appearance not observed. Recommend triad for protection and moisture management while pt uses a female external urinary catheter. Pt educated on the wound care and the importance of turning every 2 house     Recommendations:  - Buttocks and  groin: bedside nursing to cleanse with bath wipes, pat dry and apply triad bid/prn  - Turning every 2 hours  - Waffle mattress overlay  - Chair cushion      04/16/25 1038        Wound 04/14/25 0800 Moisture associated dermatitis Buttocks   Date First Assessed/Time First Assessed: 04/14/25 0800   Present on Original Admission: Yes  Primary Wound Type: Moisture associated dermatitis  Location: Buttocks   Wound Image    Dressing Appearance Open to air   Drainage Amount None   Appearance Intact;Pink;Dry;Other (see comments)  (blanchable)   Periwound Area Intact;Dry        Wound 04/16/25 1038 Intertrigo Groin   Date First Assessed/Time First Assessed: 04/16/25 1038   Present on Original Admission: Yes  Primary Wound Type: Intertrigo  Location: Groin   Dressing Appearance Open to air   Drainage Amount None   Appearance Intact;Pink;Moist   Periwound Area Intact;Moist     Isaiah score: 18  Recommendations made to primary team for above plan via secure chat. Wound care will follow-up as needed.   04/16/2025         [1]   Social History  Socioeconomic History    Marital status:    Tobacco Use    Smoking status: Former     Current packs/day: 0.00     Average packs/day: 1 pack/day for 50.0 years (50.0 ttl pk-yrs)     Types: Cigarettes     Start date: 1960     Quit date: 5/20/2009     Years since quitting: 15.9    Smokeless tobacco: Former   Substance and Sexual Activity    Alcohol use: No    Drug use: No    Sexual activity: Not Currently     Partners: Male     Birth control/protection: Partner-Vasectomy     Social Drivers of Health     Financial Resource Strain: Patient Unable To Answer (4/7/2025)    Overall Financial Resource Strain (CARDIA)     Difficulty of Paying Living Expenses: Patient unable to answer   Food Insecurity: Patient Unable To Answer (4/7/2025)    Hunger Vital Sign     Worried About Running Out of Food in the Last Year: Patient unable to answer     Ran Out of Food in the Last Year: Patient unable to  answer   Transportation Needs: Patient Unable To Answer (4/6/2025)    PRAPARE - Transportation     Lack of Transportation (Medical): Patient unable to answer     Lack of Transportation (Non-Medical): Patient unable to answer   Physical Activity: Insufficiently Active (2/17/2025)    Exercise Vital Sign     Days of Exercise per Week: 2 days     Minutes of Exercise per Session: 30 min   Stress: Patient Unable To Answer (4/7/2025)    [employer] - Occupational Stress Questionnaire     Feeling of Stress : Patient unable to answer   Housing Stability: Patient Unable To Answer (4/7/2025)    Housing Stability Vital Sign     Unable to Pay for Housing in the Last Year: Patient unable to answer     Homeless in the Last Year: Patient unable to answer

## 2025-04-16 NOTE — PLAN OF CARE
04/16/25 1143   Post-Acute Status   Post-Acute Authorization Placement   Post-Acute Placement Status Pending payor review/awaiting authorization (if required)     MARINE received call from Vilma at [facility] with [employer] ([facility]) reporting that pt's social security number was not pulling up her Medicare.  MARINE relayed this to pt at bedside who provided her correct SSN: , which MARINE then gave to Vilma.  MARINE sent message to [employer] providing them pt's correct SSN.  Meanwhile, MARINE informed by Antonio at [facility] that pt has been accepted.  Facility to contact one of pt's daughters and submit for auth.    PASSR completed via [employer].      UPDATE 4:29 PM  142 received and saved to Epic.      Karey Castelan LMSW  [facility]  [phone]

## 2025-04-16 NOTE — ASSESSMENT & PLAN NOTE
MARQUEZ is likely due to MARQUEZ on CKD. Baseline creatinine is 1.1. Most recent creatinine and eGFR are listed below.  Recent Labs     04/14/25  0500 04/15/25  0446 04/16/25  0521   CREATININE 1.2 1.1 1.1   EGFRNORACEVR 46* 51* 51*      Plan  - MARQUEZ is stable  - Avoid nephrotoxins and renally dose meds for GFR listed above  - Monitor urine output, serial BMP, and adjust therapy as needed

## 2025-04-16 NOTE — PT/OT/SLP PROGRESS
Speech Language Pathology Treatment    Patient Name:  Misa Barajas   MRN:  [id number]  Admitting Diagnosis: Complete heart block    Recommendations:                 General Recommendations:  Dysphagia therapy and ongoing ENT f/u  Diet recommendations:  Regular Diet - IDDSI Level 7, Liquid Diet Level: Thin liquids - IDDSI Level 0   Aspiration Precautions: Standard aspiration precautions   General Precautions: Standard, aspiration, fall  Communication strategies:  none    Assessment:     Misa Barajas is a 79 y.o. female with an SLP diagnosis of Dysphonia. She present with a functional swallow for unrestricted diet. SLP will continue to follow to monitor tolerance.     ENT 4/15: Flexible Laryngoscopy Exam: Larynx and Hypopharynx: Mobility of left TVF with normal adduction and abduction, right TVC paralysis with mild hemorrhage true cord    Subjective     Awake/alert, upright in bedside chair. Untouched breakfast tray at bedside.     Pain/Comfort:  Pain Rating 1: 0/10  Pain Rating Post-Intervention 1: 0/10    Respiratory Status: Room air    Objective:     Has the patient been evaluated by SLP for swallowing?   Yes  Keep patient NPO? No       Pt seen for follow up dysphagia therapy. Noted straws at bedside. She reports no difficulty swallowing and ongoing decreased appetite. Her voice remains breathy and severely dysphonic. ENT scoped her yesterday revealing R TVF paralysis. Pt required education and prompted to consumed 2 oz of water via straw sips and 2 bites of diced peaches this date. She adequate extraction and independently able to take small, single bites/sips. She demonstrated prolonged mastication, though functional and no overt s/sx of airway compromise appreciated with straw sips this date.  Continue to recommend regular diet/thin liquids at this time. Provided education re: role of SLP, s/sx of aspiration, risk for aspiration, diet recs, aspiration precautions and ongoing SLP POC. SLP will follow up to  ensure diet tolerance.  She verbalized understanding.     Goals:   Multidisciplinary Problems       SLP Goals          Problem: SLP    Goal Priority Disciplines Outcome   SLP Goal     SLP    Description: Speech Language Pathology Goals  Goals expected to be met by 4/17    1. Pt will participate in ongoing swallow assessment                              Plan:     Patient to be seen:  4 x/week   Plan of Care reviewed with:  patient   SLP Follow-Up:  Yes       Discharge recommendations:   (tbd)     Time Tracking:     SLP Treatment Date:   04/16/25  Speech Start Time:  1042  Speech Stop Time:  1050     Speech Total Time (min):  8 min    Billable Minutes: Treatment Swallowing Dysfunction 8    04/16/2025

## 2025-04-16 NOTE — PLAN OF CARE
Recommendations     Recommendation/Intervention:   1. Continue regular diet as tolerated      - please continue to document PO % intake via flowsheets     2. Continue boost plus TID   3. Encourage good intake    4. RD to monitor weight, labs, intake, tolerance     Goals:   1. % nutritional needs met with diet during admission     2. Maintain weight during admission  Nutrition Goal Status: new  Communication of RD Recs: other (comment) (POC)     Nutrition Discharge Planning     Nutrition Discharge Planning: Therapeutic diet (comments), Oral supplement regimen (comments)  Therapeutic diet (comments): cardiac renal diet  Oral supplement regimen (comments): supplement of choice

## 2025-04-16 NOTE — ASSESSMENT & PLAN NOTE
Anemia is likely due to chronic disease due to Chronic Kidney Disease. Most recent hemoglobin and hematocrit are listed below.  Recent Labs     04/14/25  0500 04/15/25  0445 04/16/25  0521   HGB 12.3 12.9 12.1   HCT 38.5 40.0 37.6     Plan  - Monitor serial CBC: Daily  - Transfuse PRBC if patient becomes hemodynamically unstable, symptomatic or H/H drops below 7/21.  - Patient has not received any PRBC transfusions to date  - Patient's anemia is currently stable

## 2025-04-16 NOTE — PLAN OF CARE
CHW completed LOCET via phone. CHW started PASSR process for SW/CM to complete and obtain the 142 for NH admission.    SAMY Yuan  [phone]

## 2025-04-16 NOTE — ASSESSMENT & PLAN NOTE
-Known RBBB. Admitted with CHB and shock.  Bradycardic with HR in the 20s on arrival, transcutaneous pacing/epi initiated, admitted to CCU  - bradycardia most likely in the setting of electrolyte disturbance and abnormal thyroid function  Plan:  -s/p micra PM placement 4/10 by EP  - correct electrolyte derangements  -f/u with EP  -CM working on placement to SNF

## 2025-04-16 NOTE — PROGRESS NOTES
[facility] - Cardiology Stepdown  Adult Nutrition  Progress Note    SUMMARY       Recommendations    Recommendation/Intervention:   1. Continue regular diet as tolerated      - please continue to document PO % intake via flowsheets     2. Continue boost plus TID   3. Encourage good intake    4. RD to monitor weight, labs, intake, tolerance    Goals:   1. % nutritional needs met with diet during admission     2. Maintain weight during admission  Nutrition Goal Status: new  Communication of RD Recs: other (comment) (POC)    Nutrition Discharge Planning    Nutrition Discharge Planning: Therapeutic diet (comments), Oral supplement regimen (comments)  Therapeutic diet (comments): cardiac renal diet  Oral supplement regimen (comments): supplement of choice    Assessment and Plan    Nutrition Problem  Inadequate energy intake    Related to (etiology):   Decreased ability to consume sufficient energy    Signs and Symptoms (as evidenced by):   Pt consuming < 75% of meals   Energy needs < intake      Interventions/Recommendations (treatment strategy):  Collaboration with other providers  ONS    Nutrition Diagnosis Status:   New    Malnutrition Assessment    Skin (Micronutrient): edema           Orbital Region (Subcutaneous Fat Loss): well nourished  Upper Arm Region (Subcutaneous Fat Loss): mild depletion  Thoracic and Lumbar Region: well nourished   [place] Region (Muscle Loss): mild depletion  Clavicle Bone Region (Muscle Loss): well nourished  Clavicle and Acromion Bone Region (Muscle Loss): mild depletion  Dorsal Hand (Muscle Loss): mild depletion  Patellar Region (Muscle Loss): well nourished  Anterior Thigh Region (Muscle Loss): well nourished  Posterior Calf Region (Muscle Loss): mild depletion     Reason for Assessment    Reason For Assessment: length of stay  Diagnosis: cardiac disease (complete heart block)  General Information Comments: Pt admitted with complete heart block. PMHx: arthritis, HTN, HLD, CKD 3,  "COPD, cancer, CHF, vit D insufficiency, acute bilary pancreatitis, aortic atherosclerosis, osteoporosis, PAF. Recent surgical hx: Implantation of leadless pacemaker and removal - temporary transvenous (4/10). No GI s/s - BM: 4/15. Pt stated she has a fair appetite. Having a poor intake - PO: 25-50%. Pt stated she is drinking boost. NFPE completed , see above for findings, age related wasting. Wt fluctuations.    Nutrition/Diet History    Spiritual, Cultural Beliefs, Moravian Practices, Values that Affect Care: no  Food Allergies: NKFA  Factors Affecting Nutritional Intake: None identified at this time    Nutrition Related Social Determinants of Health: SDOH: Adequate food in home environment    Food Insecurity: Patient Unable To Answer (2025)    Hunger Vital Sign     Worried About Running Out of Food in the Last Year: Patient unable to answer     Ran Out of Food in the Last Year: Patient unable to answer     Anthropometrics    Height: 5' 4" (162.6 cm)  Height (inches): 64 in  Height Method: Estimated  Weight: 73.4 kg (161 lb 13.1 oz)  Weight (lb): 161.82 lb  Weight Method: Standard Scale  Ideal Body Weight (IBW), Female: 120 lb  % Ideal Body Weight, Female (lb): 139.63 %  BMI (Calculated): 27.8  BMI Grade: 25 - 29.9 - overweight  Usual Body Weight (UBW), k.6 kg  % Usual Body Weight: 115.65    Lab/Procedures/Meds    Pertinent Labs Reviewed: reviewed  Pertinent Labs Comments: Cl 116 high, BUN 43 high, GFR 51 low, Ca 10.7 high, albumin 2.4 low  Pertinent Medications Reviewed: reviewed  Pertinent Medications Comments: amlodipine, bumetanide, hydralazine, levothyroxine, metoprolol, senna    Estimated/Assessed Needs    Weight Used For Calorie Calculations: 73.6 kg (162 lb 4.1 oz)  Energy Calorie Requirements (kcal): 0967-2435 kcal  Energy Need Method: Kcal/kg (25-35 kcal/kg)  Protein Requirements: 44-59 g/pro (0.6-0.8 g/kg)  Weight Used For Protein Calculations: 73.4 kg (161 lb 13.1 oz)        RDA Method " (mL): 1840  CHO Requirement: 230-322 g    Nutrition Prescription Ordered    Current Diet Order: regular  Oral Nutrition Supplement: boost plus TID    Evaluation of Received Nutrient/Fluid Intake    Energy Calories Required: not meeting needs  Protein Required: not meeting needs  Fluid Required: not meeting needs  Tolerance: tolerating  % Intake of Estimated Energy Needs: 25 - 50 %  % Meal Intake: 25 - 50 %    Nutrition Risk    Level of Risk/Frequency of Follow-up: moderate     Monitor and Evaluation    Monitor and Evaluation: Energy intake, Food and beverage intake, Protein intake, Carbohydrate intake, Diet order, Weight, Electrolyte and renal panel, Gastrointestinal profile, Glucose/endocrine profile, Inflammatory profile, Lipid profile, Nutrition focused physical findings     Nutrition Follow-Up    RD Follow-up?: Yes

## 2025-04-16 NOTE — PLAN OF CARE
Pt maintained free from falls/trauma/injuries and skin breakdown. Pt denied pain or discomfort. Plan of care reviewed. Pt verbalized understanding. All questions and concerns addressed.       Problem: Skin Injury Risk Increased  Goal: Skin Health and Integrity  Outcome: Progressing     Problem: Adult Inpatient Plan of Care  Goal: Plan of Care Review  Outcome: Progressing  Goal: Patient-Specific Goal (Individualized)  Outcome: Progressing  Goal: Optimal Comfort and Wellbeing  Outcome: Progressing  Goal: Readiness for Transition of Care  Outcome: Progressing     Problem: Wound  Goal: Optimal Coping  Outcome: Progressing  Goal: Optimal Functional Ability  Outcome: Progressing  Goal: Skin Health and Integrity  Outcome: Progressing

## 2025-04-16 NOTE — PLAN OF CARE
Plan of care discussed with patient. Patient is free of fall/trauma/injury. Denies CP or pain/discomfort. Pt reports REED, pt on 3LNC. AAOx4 and VSS. Pt had x3 unmeasured urine occurrences due to purewick leaking. Waffle mattress placed.  All questions addressed. Will continue to monitor.  Problem: Skin Injury Risk Increased  Goal: Skin Health and Integrity  Outcome: Progressing     Problem: Adult Inpatient Plan of Care  Goal: Plan of Care Review  Outcome: Progressing  Goal: Patient-Specific Goal (Individualized)  Outcome: Progressing  Goal: Absence of Hospital-Acquired Illness or Injury  Outcome: Progressing  Goal: Optimal Comfort and Wellbeing  Outcome: Progressing  Goal: Readiness for Transition of Care  Outcome: Progressing     Problem: Fall Injury Risk  Goal: Absence of Fall and Fall-Related Injury  Outcome: Progressing     Problem: Acute Kidney Injury/Impairment  Goal: Fluid and Electrolyte Balance  Outcome: Progressing  Goal: Improved Oral Intake  Outcome: Progressing  Goal: Effective Renal Function  Outcome: Progressing

## 2025-04-16 NOTE — PT/OT/SLP PROGRESS
Occupational Therapy   Treatment    Name: Misa Barajas  MRN: [id number]  Admitting Diagnosis:  Complete heart block  6 Days Post-Op    Recommendations:     Discharge Recommendations: Moderate Intensity Therapy  Discharge Equipment Recommendations:  walker, rolling  Barriers to discharge:  Decreased caregiver support    Assessment:     Misa Barajas is a 79 y.o. female with a medical diagnosis of Complete heart block.  She presents with SOB noted with activity on this date. Pt. 02 sats checked and noted to be 92% following transfer to chair and once in a settled position increased to 95%. Nurse assessed pt. SOB when up in chair. Pt. Deconditioned and below PLOF. Patient would benefit from continued OT services to maximize level of safety and independence with self-care tasks.    . Performance deficits affecting function are weakness, impaired endurance, impaired self care skills, impaired functional mobility, gait instability, impaired cardiopulmonary response to activity, decreased lower extremity function, decreased upper extremity function, impaired balance.     Rehab Prognosis:  Good; patient would benefit from acute skilled OT services to address these deficits and reach maximum level of function.       Plan:     Patient to be seen 4 x/week to address the above listed problems via self-care/home management, therapeutic activities, therapeutic exercises  Plan of Care Expires: 05/13/25  Plan of Care Reviewed with: patient    Subjective     Chief Complaint: Pt. Reported SOB following transfer to chair  Patient/Family Comments/goals: to get better  Pain/Comfort:  Pain Rating 1: 0/10  Pain Rating Post-Intervention 1: 0/10    Objective:     Communicated with: nurse prior to session.  Patient found supine with oxygen, PureWick, telemetry upon OT entry to room.    General Precautions: Standard, fall, aspiration    Orthopedic Precautions:N/A  Braces: N/A  Respiratory Status: Nasal cannula, flow 2 L/min      Occupational Performance:     Bed Mobility:    Patient completed Supine to Sit with stand by assistance     Functional Mobility/Transfers:  Patient completed Sit <> Stand Transfer with minimum assistance  with  no assistive device   Patient completed Bed <> Chair Transfer using Step Transfer technique with moderate assistance with no assistive device  Functional Mobility: Not tested    Activities of Daily Living:  Pt. Declined grooming tasks on this date      [facility] 6 Click ADL: 15    Treatment & Education:  Pt. Educated on importance of OOB/therapy  Pt. Educated on performing ankle pumps throughout the day  Pt. Educated on need for staff assist for all mobility    Patient left up in chair with all lines intact, call button in reach, and nurse notified    GOALS:   Multidisciplinary Problems       Occupational Therapy Goals          Problem: Occupational Therapy    Goal Priority Disciplines Outcome Interventions   Occupational Therapy Goal     OT, PT/OT Progressing    Description: Goals to be met by: 5/13/2025     Patient will increase functional independence with ADLs by performing:    UE Dressing with Minimal Assistance.  LE Dressing with Moderate Assistance.  Grooming while standing at sink with Contact Guard Assistance.  Toileting from bedside commode with Minimal Assistance for hygiene and clothing management.   Step transfer with Moderate Assistance  Toilet transfer to bedside commode with Moderate Assistance.                         DME Justifications:   Misa's mobility limitation cannot be sufficiently resolved by the use of a cane. Her functional mobility deficit can be sufficiently resolved with the use of a Rolling Walker. Patient's mobility limitation significantly impairs their ability to participate in one of more activities of daily living.  The use of a RW will significantly improve the patient's ability to participate in MRADLS and the patient will use it on regular basis in the home.    Time  Tracking:     OT Date of Treatment: 04/16/25  OT Start Time: 0850  OT Stop Time: 0903  OT Total Time (min): 13 min    Billable Minutes:Therapeutic Activity 13    OT/MARYA: OT     Number of MARYA visits since last OT visit: 0    4/16/2025

## 2025-04-17 PROBLEM — R79.89 ELEVATED TROPONIN: Status: RESOLVED | Noted: 2024-05-08 | Resolved: 2025-04-17

## 2025-04-17 PROBLEM — E83.39 HYPOPHOSPHATEMIA: Status: RESOLVED | Noted: 2024-05-10 | Resolved: 2025-04-17

## 2025-04-17 PROBLEM — R60.9 EDEMA: Status: RESOLVED | Noted: 2024-07-27 | Resolved: 2025-04-17

## 2025-04-17 PROBLEM — N17.9 AKI (ACUTE KIDNEY INJURY): Status: RESOLVED | Noted: 2022-05-02 | Resolved: 2025-04-17

## 2025-04-17 PROBLEM — K85.90 ACUTE PANCREATITIS: Status: RESOLVED | Noted: 2024-07-27 | Resolved: 2025-04-17

## 2025-04-17 PROBLEM — R78.81 GRAM-NEGATIVE BACTEREMIA: Status: RESOLVED | Noted: 2024-07-12 | Resolved: 2025-04-17

## 2025-04-17 PROBLEM — E83.41 HYPERMAGNESEMIA: Status: RESOLVED | Noted: 2024-05-12 | Resolved: 2025-04-17

## 2025-04-17 PROBLEM — L03.90 CELLULITIS: Status: RESOLVED | Noted: 2024-07-12 | Resolved: 2025-04-17

## 2025-04-17 PROBLEM — E83.42 HYPOMAGNESEMIA: Status: RESOLVED | Noted: 2024-03-02 | Resolved: 2025-04-17

## 2025-04-17 PROBLEM — D72.829 LEUCOCYTOSIS: Status: RESOLVED | Noted: 2024-07-12 | Resolved: 2025-04-17

## 2025-04-17 PROBLEM — D72.829 LEUKOCYTOSIS: Status: RESOLVED | Noted: 2024-08-06 | Resolved: 2025-04-17

## 2025-04-17 PROBLEM — I95.9 HYPOTENSION: Status: RESOLVED | Noted: 2024-05-20 | Resolved: 2025-04-17

## 2025-04-17 PROBLEM — E87.6 HYPOKALEMIA: Status: RESOLVED | Noted: 2024-03-02 | Resolved: 2025-04-17

## 2025-04-17 PROBLEM — N30.00 ACUTE CYSTITIS: Status: RESOLVED | Noted: 2024-05-08 | Resolved: 2025-04-17

## 2025-04-17 NOTE — ASSESSMENT & PLAN NOTE
MARQUEZ is likely due to MARQUEZ on CKD. Baseline creatinine is 1.1. Most recent creatinine and eGFR are listed below.  Recent Labs     04/15/25  0446 04/16/25  0521 04/17/25  0517   CREATININE 1.1 1.1 1.2   EGFRNORACEVR 51* 51* 46*      Plan  - MARQUEZ is stable  - Avoid nephrotoxins and renally dose meds for GFR listed above  - Monitor urine output, serial BMP, and adjust therapy as needed

## 2025-04-17 NOTE — PLAN OF CARE
Auth request escalated to  leadership.      Karey Castelan LMSW  [facility]  [id number]

## 2025-04-17 NOTE — PROGRESS NOTES
[facility] - Cardiology [facility]  Progress Note    Patient Name: Misa Barajas  MRN: [id number]  Patient Class: IP- Inpatient   Admission Date: 4/6/2025  Length of Stay: 11 days  Attending Physician: Steph Newby MD  Primary Care Provider: Isela Langston MD        Subjective     Principal Problem: Complete heart block    HPI:  Ms. Misa Barajas is a 80 yo female with COPD on 2L home O2, HFpEF, AFL s/p RFA (6/17/24 w/ Dr. Church), CKD III, HTN, HLD, chronic venous insufficiency, orthostatic hypotension who was brought via EMS after per history she was found confused and bradycardiac with HR in the 20s.They started epi and transcutaneous pacing. Given Versed for the transcutaneous pacing. They had to bag her in route. We do not have strip of underlying rhythm. Patient obtunded for which she was intubated for airway protection on arrival to the ED and transcutaneous pacing was exchanged to transvenous pacing via RIJ (threshold 0.6). Underlying rate 44 bifascicular block. Known RBBB. Per daughters patient had called them over the phone when they noticed she appeared confused. Due to concerns for a possible syncopal episode EMS was called. Prior to event patient had been complaining of malaise and nausea for which she took about two Zofran pills per the daughters.      She had been recently seen on 3/27/25 at the [facility] visit following recent admission for AHRF 2/2 ADHF and COPD exacerbation. On ED presentation BNP elevated to 2500, troponin elevated to 19, CXR with pulmonary edema. She was started on steroids, nebs and antibiotics in addition to IV diuresis. Updated TTE stable from prior, showing HFpEF with elevated CVP 15. Her O2 requirements improved to baseline with diuresis and she was subsequently discharged. At that visit patient had not required midodrine as blood pressure had remained stable and she was asymptomatic.      Off note, patient was taken off OAC due to bleeding issues and  no recurrent of typical atrial flutter after ablation.     Overview/Hospital Course:  Admitted to CCU for CHB in s/o hyperkalemia & in the presence of conduction disease/RBBB & LAHB - stable w/ temporary wire. Septic shock d/t UTI; vasopressors weaned off. HTN; started NG gtt. Acute on chronic hypoxic/hypercapnic RF & obtunded; intubated. Successfully extubated 04/08. MARQUEZ on CKD; improved w/ good UOP response to diuresis. PVCs w/ intermittent pacing by TVP. Switched pip-tazo to amox-clav on 04/09; continued to improve; empiric EED 04/13 for 7 day course. Micra PM by EP placed 04/10. Medically stable for stepdown. Patient was extremely short of breath with physical therapy, chest x-ray concerning for pulmonary edema.  Started patient on IV diuresis, now euvolemic.  ENT was consulted yesterday due to hoarseness of voice.  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 Pt uninterested in any procedure at this time, ENT recommended SLP follow up.  Able to tolerate regular diet at this point.  Now agreeable for SNF, working on placement. Medically ready for d/c      Interval History:   NAEON & remains HDS. Continued hoarseness post-extubation, otherwise no complaints. Na elevated this AM. 250cc LR bolus ordered. Otherwise remains medically ready for discharge.       Review of Systems  Objective:     Vital Signs (Most Recent):  Temp: 97.5 °F (36.4 °C) (04/15/25 0750)  Pulse: 79 (04/15/25 0750)  Resp: 18 (04/15/25 0750)  BP: (!) 148/64 (04/15/25 0750)  SpO2: 95 % (04/15/25 0750) Vital Signs (24h Range):  Temp:  [97.4 °F (36.3 °C)-97.9 °F (36.6 °C)] 97.5 °F (36.4 °C)  Pulse:  [68-89] 79  Resp:  [18-20] 18  SpO2:  [81 %-95 %] 95 %  BP: (132-161)/(58-78) 148/64     Weight: 73.4 kg (161 lb 13.1 oz)  Body mass index is 27.78 kg/m².    Intake/Output Summary (Last 24 hours) at 4/15/2025 1059  Last data filed at 4/15/2025 0976  Gross per 24 hour   Intake 602 ml   Output 700 ml   Net -98 ml          Physical Exam  Constitutional:       General: She is not in acute distress.     Appearance: She is not toxic-appearing or diaphoretic.   HENT:      Head: Normocephalic and atraumatic.      Mouth/Throat:      Mouth: Mucous membranes are moist.   Eyes:      General: No scleral icterus.     Conjunctiva/sclera: Conjunctivae normal.   Cardiovascular:      Rate and Rhythm: Normal rate and regular rhythm.      Heart sounds: Normal heart sounds. No murmur heard.  Pulmonary:      Effort: Pulmonary effort is normal. No respiratory distress.      Breath sounds: Normal breath sounds. No wheezing, rhonchi or rales.   Abdominal:      General: Abdomen is flat. Bowel sounds are normal. There is no distension.      Palpations: Abdomen is soft.      Tenderness: There is no abdominal tenderness. There is no guarding.   Musculoskeletal:         General: No signs of injury.      Right lower leg: No edema.      Left lower leg: No edema.   Skin:     General: Skin is warm and dry.   Neurological:      General: No focal deficit present.      Mental Status: She is alert and oriented to person, place, and time. Mental status is at baseline.   Psychiatric:         Mood and Affect: Mood normal.         Behavior: Behavior normal.             Significant Labs: All pertinent labs within the past 24 hours have been reviewed.    Significant Imaging: I have reviewed all pertinent imaging results/findings within the past 24 hours.      Assessment & Plan  Complete heart block  RBBB  Shock  -Known RBBB. Admitted with CHB and shock.  Bradycardic with HR in the 20s on arrival, transcutaneous pacing/epi initiated, admitted to CCU  - bradycardia most likely in the setting of electrolyte disturbance and abnormal thyroid function  Plan:  -s/p micra PM placement 4/10 by EP  - correct electrolyte derangements  -f/u with EP  -CM working on placement to SNF    Dysphonia  Paralysis of right vocal cord  -Persistent hoarseness since extubation on 4/8  -ENT  "consulted, status post laryngoscopy 4/15 which noted right TDC paralysis with mild hemorrhage of true cord, adequate glottic closure noted  -Pt uninterested in VC augmentation at this time, needs outpatient SLP follow up    Typical atrial flutter  -s/p ablation 6/2024 without recurrence  -AC discontinued by outpatient EP provider  -Cont BB    CKD (chronic kidney disease) stage 3, GFR 30-59 ml/min  Creatine stable for now. BMP reviewed- noted Estimated Creatinine Clearance: 36.1 mL/min (based on SCr of 1.2 mg/dL). according to latest data. Based on current GFR, CKD stage is stage 4 - GFR 15-29.  Monitor UOP and serial BMP and adjust therapy as needed. Renally dose meds. Avoid nephrotoxic medications and procedures.    Centrilobular emphysema  Prior history of smoking. Continue scheduled inhalers once extubated. Antibiotics and Supplemental oxygen and monitor respiratory status closely.     Chronic respiratory failure with hypoxia  Patient with Hypoxic Respiratory failure which is Chronic.  she is on home oxygen at 2 LPM. Supplemental oxygen was provided and noted-       Signs/symptoms of respiratory failure include- tachypnea, increased work of breathing, use of accessory muscles, and lethargy. Contributing diagnoses includes - underlying COPD   -transition to home bumex    MARQUEZ (acute kidney injury) (Resolved: 4/17/2025)  MARQUEZ is likely due to  MARQUEZ on CKD . Baseline creatinine is  1.1 . Most recent creatinine and eGFR are listed below.  Recent Labs     04/15/25  0446 04/16/25  0521 04/17/25  0517   CREATININE 1.1 1.1 1.2   EGFRNORACEVR 51* 51* 46*      Plan  - MARQUEZ is stable  - Avoid nephrotoxins and renally dose meds for GFR listed above  - Monitor urine output, serial BMP, and adjust therapy as needed    Acute on chronic congestive heart failure  Most recent BNP and echo results are listed below.  No results for input(s): "BNP" in the last 72 hours.     Echo  Result Date: 4/7/2025    Left Ventricle: The left " ventricle is normal in size. Normal wall   thickness. There is low normal systolic function with a visually estimated   ejection fraction of 50 - 55%. Grade II diastolic dysfunction.    Right Ventricle: The right ventricle is normal in size. Wall thickness   is normal. Systolic function is normal.    IVC/SVC: Patient is ventilated, cannot use IVC diameter to estimate   right atrial pressure.      Current Heart Failure Medications: have been held due to bradycardia and MARQUEZ     Plan  - Monitor strict I&Os and daily weights.    - Place on telemetry  - resolved, switched to home bumex    Orthostatic hypotension  holding midodrine and per history patient had not required it since last hospitalization     Iron deficiency anemia due to chronic blood loss  Anemia is likely due to chronic disease due to Chronic Kidney Disease. Most recent hemoglobin and hematocrit are listed below.  Recent Labs     04/15/25  0445 04/16/25  0521 04/17/25  0517   HGB 12.9 12.1 12.1   HCT 40.0 37.6 38.2     Plan  - Monitor serial CBC: Daily  - Transfuse PRBC if patient becomes hemodynamically unstable, symptomatic or H/H drops below 7/21.  - Patient has not received any PRBC transfusions to date  - Patient's anemia is currently stable    Subclinical hypothyroidism      VTE Risk Mitigation (From admission, onward)           Ordered     IP VTE HIGH RISK PATIENT  Once         04/06/25 0512     Place sequential compression device  Until discontinued         04/06/25 0454                    Discharge Planning   NOHEMI: 4/17/2025     Code Status: Full Code   Medical Readiness for Discharge Date: 4/16/2025  Discharge Plan A: Skilled Nursing Facility                Please place Justification for DME        Steph Newby MD  Department of Hospital Medicine   [facility] - Cardiology Stepdown

## 2025-04-17 NOTE — ASSESSMENT & PLAN NOTE
Anemia is likely due to chronic disease due to Chronic Kidney Disease. Most recent hemoglobin and hematocrit are listed below.  Recent Labs     04/15/25  0445 04/16/25  0521 04/17/25  0517   HGB 12.9 12.1 12.1   HCT 40.0 37.6 38.2     Plan  - Monitor serial CBC: Daily  - Transfuse PRBC if patient becomes hemodynamically unstable, symptomatic or H/H drops below 7/21.  - Patient has not received any PRBC transfusions to date  - Patient's anemia is currently stable

## 2025-04-17 NOTE — PLAN OF CARE
Pt maintained free from falls/trauma/injuries and skin breakdown. Pt denied pain or discomfort. Plan of care reviewed. Pt verbalized understanding. All questions and concerns addressed.

## 2025-04-17 NOTE — PLAN OF CARE
Plan of care discussed with patient. Patient is free of fall/trauma/injury. Denies pain/discomfort. All questions addressed. Will continue to monitor    Problem: Skin Injury Risk Increased  Goal: Skin Health and Integrity  Outcome: Progressing     Problem: Adult Inpatient Plan of Care  Goal: Plan of Care Review  Outcome: Progressing  Goal: Patient-Specific Goal (Individualized)  Outcome: Progressing  Goal: Absence of Hospital-Acquired Illness or Injury  Outcome: Progressing  Goal: Optimal Comfort and Wellbeing  Outcome: Progressing  Goal: Readiness for Transition of Care  Outcome: Progressing     Problem: Fall Injury Risk  Goal: Absence of Fall and Fall-Related Injury  Outcome: Progressing     Problem: Wound  Goal: Optimal Coping  Outcome: Progressing  Goal: Optimal Functional Ability  Outcome: Progressing  Goal: Absence of Infection Signs and Symptoms  Outcome: Progressing  Goal: Improved Oral Intake  Outcome: Progressing  Goal: Optimal Pain Control and Function  Outcome: Progressing  Goal: Skin Health and Integrity  Outcome: Progressing  Goal: Optimal Wound Healing  Outcome: Progressing  Problem: Acute Kidney Injury/Impairment  Goal: Fluid and Electrolyte Balance  Outcome: Progressing  Goal: Improved Oral Intake  Outcome: Progressing  Goal: Effective Renal Function  Outcome: Progressing

## 2025-04-17 NOTE — PLAN OF CARE
[facility] - Cardiology Stepdown  Discharge Reassessment    Primary Care Provider: Isela Langston MD    Expected Discharge Date: 4/18/2025    Reassessment (most recent)       Discharge Reassessment - 04/17/25 1531          Discharge Reassessment    Assessment Type Discharge Planning Reassessment     Did the patient's condition or plan change since previous assessment? Yes     Discharge Plan discussed with: Patient     Communicated NOHEMI with patient/caregiver Yes     Discharge Plan A Skilled Nursing Facility     Discharge Plan B Home Health     DME Needed Upon Discharge  none     Transition of Care Barriers None     Why the patient remains in the hospital Insurance issues        Post-Acute Status    Post-Acute Authorization Placement     Post-Acute Placement Status Pending payor review/awaiting authorization (if required)     Discharge Delays Payor Issues                   MARINE met with pt at bedside and reviewed IMM and discussed that discharge is just pending insurance auth.  Pt voiced understanding.    MARINE later called and confirmed with Antonio in admissions at [facility] that auth is still pending, but per Antonio they are open tomorrow 4/18 (Good Friday).  Per Antonio she will also reach out to family tomorrow for admissions paperwork.  MARINE informed kassandra if they get auth tomorrow to call MARINE Flowers, who will be covering the floor for the holiday.    Discharge Plan A and Plan B have been determined by review of patient's clinical status, future medical and therapeutic needs, and coverage/benefits for post-acute care in coordination with multidisciplinary team members.      Karey Castelan LMSW  [facility]  [phone]

## 2025-04-17 NOTE — PT/OT/SLP PROGRESS
Speech Language Pathology Treatment    Patient Name:  Misa Barajas   MRN:  [id number]  Admitting Diagnosis: Complete heart block    Recommendations:                 General Recommendations:  Monitor diet tolerance & ongoing ENT f/u  Diet recommendations:  Regular Diet - IDDSI Level 7, Liquid Diet Level: Thin liquids - IDDSI Level 0   Aspiration Precautions: Standard aspiration precautions   General Precautions: Standard, fall, aspiration  Communication strategies:  none    Assessment:     Misa Barajas is a 79 y.o. 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 She present with a functional swallow for unrestricted diet. SLP will continue to follow to monitor tolerance.     Subjective     Awake/alert in bed.     Pain/Comfort:  Pain Rating 1: 0/10  Pain Rating Post-Intervention 1: 0/10    Respiratory Status: Room air    Objective:     Has the patient been evaluated by SLP for swallowing?   Yes  Keep patient NPO? No       Pt seen for ongoing follow up. Her voice remains breathy and dysphonic 2/2 R TVF paralysis seen on ENT scope date 4/16. Provided education on result of scope and reasoning for dysphonia within SLP scope. All questions answered within SLP scope and discussed ongoing diet recs and strategies as well as vocal adduction strategies (digit manipulation and bearing down) and right head turn with intake of liquids should s/sx of aspiration be apparent. Worked on intake of small single open cup sips of water x 5. She was able to tolerate with head in midline position and in right head turn without any overt s/symptoms of airway compromise. Continue to recommend regular diet/thin liquids at this time. 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 SLP will continue to follow.  She verbalized understanding. White board current.     Goals:    Multidisciplinary Problems       SLP Goals          Problem: SLP    Goal Priority Disciplines Outcome   SLP Goal     SLP    Description: Speech Language Pathology Goals  Goals expected to be met by 4/17    1. Pt will participate in ongoing swallow assessment                              Plan:     Patient to be seen:  3 x/week   Plan of Care reviewed with:  patient   SLP Follow-Up:  Yes       Discharge recommendations:   (tbd)     Time Tracking:     SLP Treatment Date:   04/17/25  Speech Start Time:  1100  Speech Stop Time:  1113     Speech Total Time (min):  13 min    Billable Minutes: Treatment Swallowing Dysfunction 5 and Self Care/Home Management Training 8    04/17/2025

## 2025-04-18 PROBLEM — E87.0 HYPERNATREMIA: Status: ACTIVE | Noted: 2025-04-18

## 2025-04-18 NOTE — PLAN OF CARE
AAOx4,O2 sats > 95% on 3L NC. Plan of care discussed with patient. Patient has no complaints of chest pain/SOB/palpitations. Pt ambulating  with assist x 1 fall precautions in place,no falls/injuries through the shift.Discussed medications and care.Patient has no questions at this time.Pt resting comfortably with no acute distress.Call light within reach,bed at lowest position.      Problem: Skin Injury Risk Increased  Goal: Skin Health and Integrity  Outcome: Progressing     Problem: Adult Inpatient Plan of Care  Goal: Plan of Care Review  Outcome: Progressing  Goal: Patient-Specific Goal (Individualized)  Outcome: Progressing  Goal: Absence of Hospital-Acquired Illness or Injury  Outcome: Progressing  Goal: Optimal Comfort and Wellbeing  Outcome: Progressing  Goal: Readiness for Transition of Care  Outcome: Progressing     Problem: Fall Injury Risk  Goal: Absence of Fall and Fall-Related Injury  Outcome: Progressing     Problem: Infection  Goal: Absence of Infection Signs and Symptoms  Outcome: Progressing     Problem: Wound  Goal: Optimal Coping  Outcome: Progressing  Goal: Optimal Functional Ability  Outcome: Progressing  Goal: Absence of Infection Signs and Symptoms  Outcome: Progressing  Goal: Improved Oral Intake  Outcome: Progressing  Goal: Optimal Pain Control and Function  Outcome: Progressing  Goal: Skin Health and Integrity  Outcome: Progressing  Goal: Optimal Wound Healing  Outcome: Progressing     Problem: Acute Kidney Injury/Impairment  Goal: Fluid and Electrolyte Balance  Outcome: Progressing  Goal: Improved Oral Intake  Outcome: Progressing  Goal: Effective Renal Function  Outcome: Progressing     Problem: Delirium  Goal: Optimal Coping  Outcome: Progressing  Goal: Improved Behavioral Control  Outcome: Progressing  Goal: Improved Attention and Thought Clarity  Outcome: Progressing  Goal: Improved Sleep  Outcome: Progressing

## 2025-04-18 NOTE — ASSESSMENT & PLAN NOTE
Very slowly uptrending Na since admission. Peak 150 on 4/17 for which pt received 250cc LR. Na 149 the following day & pt w/ increased O2 needs & LE edema. Unclear etiology as pt does not appear hypovolemic.   - Holding further IVF for now  - Urine studies pending  - Consider Nephrology consult  - Monitor BMP Q12-24H

## 2025-04-18 NOTE — PLAN OF CARE
Plan of care discussed with patient. Patient is free of fall/trauma/injury. Denies pain/discomfort. Pt c/o REED and SOB, 1x lasix IVP given per RN, pt on 4LNC, sats > 92%.  AAOx4. Reports feeling better after getting lasix, pt had multiple unmeasured urine occurences.  All questions addressed. Will continue to monitor    Problem: Skin Injury Risk Increased  Goal: Skin Health and Integrity  Outcome: Progressing     Problem: Adult Inpatient Plan of Care  Goal: Plan of Care Review  Outcome: Progressing  Goal: Patient-Specific Goal (Individualized)  Outcome: Progressing  Goal: Absence of Hospital-Acquired Illness or Injury  Outcome: Progressing  Goal: Optimal Comfort and Wellbeing  Outcome: Progressing  Goal: Readiness for Transition of Care  Outcome: Progressing     Problem: Fall Injury Risk  Goal: Absence of Fall and Fall-Related Injury  Outcome: Progressing     Problem: Infection  Goal: Absence of Infection Signs and Symptoms  Outcome: Progressing     Problem: Acute Kidney Injury/Impairment  Goal: Fluid and Electrolyte Balance  Outcome: Progressing  Goal: Improved Oral Intake  Outcome: Progressing  Goal: Effective Renal Function  Outcome: Progressing

## 2025-04-18 NOTE — PT/OT/SLP PROGRESS
Physical Therapy Treatment    Patient Name:  Misa Barajas   MRN:  [id number]    Recommendations:     Discharge Recommendations: Moderate Intensity Therapy  Discharge Equipment Recommendations: walker, rolling  Barriers to discharge: Decreased caregiver support    Assessment:     Misa Barajas is a 79 y.o. female admitted with a medical diagnosis of Complete heart block.  She presents with the following impairments/functional limitations: weakness, impaired endurance, impaired functional mobility, gait instability, decreased lower extremity function, impaired balance     During today's session, pt able to perform standing transfers and small gait trial. Limited due to continued need for breaks for recovery from activity. Patient continues to demonstrate the need for moderate intensity therapy on a daily basis post acute exhibited by decreased independence with functional mobility      Rehab Prognosis: Good; patient would benefit from acute skilled PT services to address these deficits and reach maximum level of function.    Recent Surgery: Procedure(s) (LRB):  INSERTION, CARDIAC PACEMAKER, LEADLESS (N/A)  Removal, Pacemaker, Temporary Transvenous 8 Days Post-Op    Plan:     During this hospitalization, patient to be seen 4 x/week to address the identified rehab impairments via gait training, therapeutic activities, therapeutic exercises and progress toward the following goals:    Plan of Care Expires:  05/13/25    Subjective     Chief Complaint: shortness of breath  Patient/Family Comments/goals: for breathing to improve.   Pain/Comfort:  Pain Rating 1: 0/10      Objective:     Communicated with nsg prior to session.  Patient found supine with telemetry, oxygen, PureWick upon PT entry to room.     General Precautions: Standard, fall, aspiration  Orthopedic Precautions: N/A  Braces: N/A  Respiratory Status: Nasal cannula, flow 5 L/min       Functional Mobility  Bed Mobility  Supine to Sit: minimum assistance   Sit  to Supine:  not performed secondary to pt remaining up in chair   Transfers Sit to Stand:  minimum assistance and moderate assistance with rolling walker for 2 trials  - MIN A from hospital bed, MOD A from bedside chair with RW usage     Gait Gait Distance: 5 ft FWD/BWD with RW usage  Assistance Level: minimum assistance  Description: pt with slowed faith, incr kyphosis and forward trunk lean due to increased work of breathing required.          Balance   Static Sitting stand by assistance   Dynamic Sitting contact guard assistance   Static Standing minimum assistance   Dynamic Standing minimum assistance       AM-PAC 6 CLICK MOBILITY  Turning over in bed (including adjusting bedclothes, sheets and blankets)?: 3  Sitting down on and standing up from a chair with arms (e.g., wheelchair, bedside commode, etc.): 3  Moving from lying on back to sitting on the side of the bed?: 3  Moving to and from a bed to a chair (including a wheelchair)?: 3  Need to walk in hospital room?: 2  Climbing 3-5 steps with a railing?: 2  Basic Mobility Total Score: 16       Treatment & Education:  PT provided cueing to perform pursed lip breathing to assist with oxygen recovery with activity.   Patient Education   PT educated pt on the following  - role of PT  - PT POC (including frequency and duration while in hospital)  - discharge recommendation (mod intensity) and equipment needs (RW)  - level of assistance currently req (1 person)and safety precautions with nsg staff; educated to sit up in chair for ~1 hr to assist with improvement with overall mobility.   All questions and concerns answered and addressed. White board updated with pertinent information. Nsg notified.       Patient left up in chair with all lines intact, call button in reach, and nsg notified..    GOALS:   Multidisciplinary Problems       Physical Therapy Goals          Problem: Physical Therapy    Goal Priority Disciplines Outcome Interventions   Physical Therapy  Goal     PT, PT/OT Progressing    Description: PT goals until 4/24/25    1. Pt supine to sit with CGA-not met  2. Pt sit to supine with CGA-not met  3. Pt sit to stand with RW with CGA-not met  4. Pt to perform gait 10ft with RW with CGA.-not met  5. Pt to transfer bed to/from bedside chair with HHA with minimal assist.-not met  6. Pt to up/down 1 step with RW with minimal assist.-not met  7. Pt to perform B LE exs in sitting or supine x 10 reps to strengthen B LE to improve functional mobility.-not met    DME Justifications (see above for complete DME recommendations)    Bedside Commode- Patient has a mobility limitation that significantly impairs their ability to participate in one or more mobility related activities of daily living, including toileting. This deficit can be resolved by using a bedside commode. Patient demonstrates mobility limitations that will cause them to be confined to one room at home without bathroom access for up to 30 days. Using a bedside commode will greatly improve the patient's ability to participate in MRADLs.     Rolling Walker- Patient demonstrates a mobility limitation that significantly impairs their ability to participate in one or more mobility related activities of daily living. Patient's mobility limitation cannot be sufficiently resolved with the use of a cane, but can be sufficiently resolved with the use of a rolling walker.The use of a rolling walker will considerably improve their ability to participate in MRADLs. Patient will use the walker on a regular basis at home.                              DME Justifications:   Misa's mobility limitation cannot be sufficiently resolved by the use of a cane. Her functional mobility deficit can be sufficiently resolved with the use of a Rolling Walker. Patient's mobility limitation significantly impairs their ability to participate in one of more activities of daily living.  The use of a RW will significantly improve the patient's  ability to participate in MRADLS and the patient will use it on regular basis in the home.    Time Tracking:     PT Received On: 04/18/25  PT Start Time: 1104     PT Stop Time: 1130  PT Total Time (min): 26 min     Billable Minutes: Therapeutic Activity 15  and Therapeutic Exercise 11    Treatment Type: Treatment  PT/PTA: PT     Number of PTA visits since last PT visit: 0     04/18/2025

## 2025-04-18 NOTE — PROGRESS NOTES
[facility] - Cardiology [facility] Medicine  Progress Note    Patient Name: Misa Barajas  MRN: [id number]  Patient Class: IP- Inpatient   Admission Date: 4/6/2025  Length of Stay: 12 days  Attending Physician: Steph Newby MD  Primary Care Provider: Isela Langston MD        Subjective     Principal Problem: Complete heart block    HPI:  Ms. Misa Barajas is a 78 yo female with COPD on 2L home O2, HFpEF, AFL s/p RFA (6/17/24 w/ Dr. Church), CKD III, HTN, HLD, chronic venous insufficiency, orthostatic hypotension who was brought via EMS after per history she was found confused and bradycardiac with HR in the 20s.They started epi and transcutaneous pacing. Given Versed for the transcutaneous pacing. They had to bag her in route. We do not have strip of underlying rhythm. Patient obtunded for which she was intubated for airway protection on arrival to the ED and transcutaneous pacing was exchanged to transvenous pacing via RIJ (threshold 0.6). Underlying rate 44 bifascicular block. Known RBBB. Per daughters patient had called them over the phone when they noticed she appeared confused. Due to concerns for a possible syncopal episode EMS was called. Prior to event patient had been complaining of malaise and nausea for which she took about two Zofran pills per the daughters.      She had been recently seen on 3/27/25 at the [facility] visit following recent admission for AHRF 2/2 ADHF and COPD exacerbation. On ED presentation BNP elevated to 2500, troponin elevated to 19, CXR with pulmonary edema. She was started on steroids, nebs and antibiotics in addition to IV diuresis. Updated TTE stable from prior, showing HFpEF with elevated CVP 15. Her O2 requirements improved to baseline with diuresis and she was subsequently discharged. At that visit patient had not required midodrine as blood pressure had remained stable and she was asymptomatic.      Off note, patient was taken off OAC due to bleeding issues and  no recurrent of typical atrial flutter after ablation.     Overview/Hospital Course:  Admitted to CCU for CHB in s/o hyperkalemia & in the presence of conduction disease/RBBB & LAHB - stable w/ temporary wire. Septic shock d/t UTI; vasopressors weaned off. HTN; started NG gtt. Acute on chronic hypoxic/hypercapnic RF & obtunded; intubated. Successfully extubated 04/08. MARQUEZ on CKD; improved w/ good UOP response to diuresis. PVCs w/ intermittent pacing by TVP. Switched pip-tazo to amox-clav on 04/09; continued to improve; empiric EED 04/13 for 7 day course. Micra PM by EP placed 04/10. Medically stable for stepdown. Patient was extremely short of breath with physical therapy, chest x-ray concerning for pulmonary edema.  Started patient on IV diuresis, now euvolemic.  ENT was consulted yesterday due to hoarseness of voice.  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 Pt uninterested in any procedure at this time, ENT recommended SLP follow up.  Able to tolerate regular diet at this point.  Now agreeable for SNF, working on placement. Course c/b worsening hypoxia & hyperNa.       Interval History: HyperNa (Na 150) yesterday for which pt received very gentle IVF (250cc LR over 1 hour). NAEON. This AM pt endorsing increased SOB and now requiring 3-4L NC. No crackles on auscultation, but decreased breath sounds up to mid-lung fields w/ trace-to-1+ edema of LEs. CXR ordered along w/ PRN nebs. May consider addn'l dose of diuretics today, but may worsen hyperNa. Urine studies ordered. May consider Nephro consult.     Review of Systems   Constitutional:  Positive for fatigue. Negative for chills, diaphoresis and fever.   HENT:  Positive for voice change. Negative for congestion, rhinorrhea, sore throat and trouble swallowing.    Respiratory:  Positive for shortness of breath. Negative for cough and wheezing.    Cardiovascular:  Positive for leg swelling. Negative for chest pain and  palpitations.   Gastrointestinal:  Negative for abdominal pain, diarrhea, nausea and vomiting.   Genitourinary:  Negative for difficulty urinating, dysuria and hematuria.   Neurological:  Negative for dizziness, light-headedness and headaches.   Psychiatric/Behavioral:  Negative for agitation, confusion and sleep disturbance.        Objective:     Vital Signs (Most Recent):  Temp: 97 °F (36.1 °C) (04/18/25 0712)  Pulse: 75 (04/18/25 0800)  Resp: 18 (04/18/25 0712)  BP: 137/62 (04/18/25 0712)  SpO2: (!) 93 % (04/18/25 0815) Vital Signs (24h Range):  Temp:  [96.8 °F (36 °C)-97 °F (36.1 °C)] 97 °F (36.1 °C)  Pulse:  [57-78] 75  Resp:  [17-18] 18  SpO2:  [91 %-96 %] 93 %  BP: (114-154)/(55-67) 137/62     Weight: 73.4 kg (161 lb 13.1 oz)  Body mass index is 27.78 kg/m².    Intake/Output Summary (Last 24 hours) at 4/18/2025 1040  Last data filed at 4/18/2025 0715  Gross per 24 hour   Intake 240 ml   Output 1450 ml   Net -1210 ml         Physical Exam  Constitutional:       General: She is not in acute distress.     Appearance: She is not toxic-appearing or diaphoretic.   HENT:      Head: Normocephalic and atraumatic.      Mouth/Throat:      Mouth: Mucous membranes are moist.   Eyes:      General: No scleral icterus.     Conjunctiva/sclera: Conjunctivae normal.   Cardiovascular:      Rate and Rhythm: Normal rate and regular rhythm.      Heart sounds: Normal heart sounds. No murmur heard.  Pulmonary:      Breath sounds: Examination of the right-upper field reveals decreased breath sounds. Examination of the left-upper field reveals decreased breath sounds. Examination of the left-middle field reveals decreased breath sounds. Decreased breath sounds present. No wheezing, rhonchi or rales.   Abdominal:      General: Abdomen is flat. Bowel sounds are normal. There is no distension.      Palpations: Abdomen is soft.      Tenderness: There is no abdominal tenderness. There is no guarding.   Musculoskeletal:         General: No  signs of injury.      Right lower leg: Edema (trace to 1+, pitting) present.      Left lower leg: Edema (trace to 1+, pitting) present.   Skin:     General: Skin is warm and dry.   Neurological:      General: No focal deficit present.      Mental Status: She is alert and oriented to person, place, and time. Mental status is at baseline.   Psychiatric:         Mood and Affect: Mood normal.         Behavior: Behavior normal.           Significant Labs: All pertinent labs within the past 24 hours have been reviewed.    Significant Imaging: I have reviewed all pertinent imaging results/findings within the past 24 hours.      Assessment & Plan  Complete heart block  RBBB  Shock  -Known RBBB. Admitted with CHB and shock.  Bradycardic with HR in the 20s on arrival, transcutaneous pacing/epi initiated, admitted to CCU  - bradycardia most likely in the setting of electrolyte disturbance and abnormal thyroid function  Plan:  -s/p micra PM placement 4/10 by EP  - correct electrolyte derangements  -f/u with EP  -CM working on placement to SNF    Dysphonia  Paralysis of right vocal cord  -Persistent hoarseness since extubation on 4/8  -ENT consulted, status post laryngoscopy 4/15 which noted right TDC paralysis with mild hemorrhage of true cord, adequate glottic closure noted  -Pt uninterested in VC augmentation at this time, needs outpatient SLP follow up    Typical atrial flutter  -s/p ablation 6/2024 without recurrence  -AC discontinued by outpatient EP provider  -Cont BB    CKD (chronic kidney disease) stage 3, GFR 30-59 ml/min  Creatine stable for now. BMP reviewed- noted Estimated Creatinine Clearance: 39.4 mL/min (based on SCr of 1.1 mg/dL). according to latest data. Based on current GFR, CKD stage is stage 4 - GFR 15-29.  Monitor UOP and serial BMP and adjust therapy as needed. Renally dose meds. Avoid nephrotoxic medications and procedures.    Centrilobular emphysema  Prior history of smoking. Continue scheduled  "inhalers once extubated. Antibiotics and Supplemental oxygen and monitor respiratory status closely.     Chronic respiratory failure with hypoxia  Patient with Hypoxic Respiratory failure which is Chronic.  she is on home oxygen at 2 LPM. Supplemental oxygen was provided and noted-       Signs/symptoms of respiratory failure include- tachypnea, increased work of breathing, use of accessory muscles, and lethargy. Contributing diagnoses includes - underlying COPD   -transition to home bumex    Acute on chronic congestive heart failure  Most recent BNP and echo results are listed below.  No results for input(s): "BNP" in the last 72 hours.     Echo  Result Date: 4/7/2025    Left Ventricle: The left ventricle is normal in size. Normal wall   thickness. There is low normal systolic function with a visually estimated   ejection fraction of 50 - 55%. Grade II diastolic dysfunction.    Right Ventricle: The right ventricle is normal in size. Wall thickness   is normal. Systolic function is normal.    IVC/SVC: Patient is ventilated, cannot use IVC diameter to estimate   right atrial pressure.      Current Heart Failure Medications: have been held due to bradycardia and MARQUEZ     Plan  - Continue home bumex  - BP mgmt via hydralazine & metoprolol  - Repeat CXR ordered; consider extra dose of diuretics   - Monitor strict I&Os and daily weights.  - Monitor electrolytes, replete PRN for goal K > 4 & Mg > 2  - Sodium (2g/day) & fluid (1.8L/day) restriction   - Telemetry    Orthostatic hypotension  holding midodrine and per history patient had not required it since last hospitalization     Iron deficiency anemia due to chronic blood loss  Anemia is likely due to chronic disease due to Chronic Kidney Disease. Most recent hemoglobin and hematocrit are listed below.  Recent Labs     04/16/25  0521 04/17/25  0517 04/18/25  0538   HGB 12.1 12.1 12.2   HCT 37.6 38.2 39.1     Plan  - Monitor serial CBC: Daily  - Transfuse PRBC if patient " becomes hemodynamically unstable, symptomatic or H/H drops below 7/21.  - Patient has not received any PRBC transfusions to date  - Patient's anemia is currently stable    Subclinical hypothyroidism      Hypernatremia  Very slowly uptrending Na since admission. Peak 150 on 4/17 for which pt received 250cc LR. Na 149 the following day & pt w/ increased O2 needs & LE edema. Unclear etiology as pt does not appear hypovolemic.   - Holding further IVF for now  - Urine studies pending  - Consider Nephrology consult  - Monitor BMP Q12-24H       VTE Risk Mitigation (From admission, onward)           Ordered     IP VTE HIGH RISK PATIENT  Once         04/06/25 0512     Place sequential compression device  Until discontinued         04/06/25 0454                    Discharge Planning   NOHEMI: 4/21/2025     Code Status: Full Code   Medical Readiness for Discharge Date:   Discharge Plan A: Skilled Nursing Facility   Discharge Delays: (!) Payor Issues        Please place Justification for DME        Steph Newby MD  Department of Hospital Medicine   [facility] - Cardiology Stepdown

## 2025-04-18 NOTE — ASSESSMENT & PLAN NOTE
Anemia is likely due to chronic disease due to Chronic Kidney Disease. Most recent hemoglobin and hematocrit are listed below.  Recent Labs     04/16/25  0521 04/17/25  0517 04/18/25  0538   HGB 12.1 12.1 12.2   HCT 37.6 38.2 39.1     Plan  - Monitor serial CBC: Daily  - Transfuse PRBC if patient becomes hemodynamically unstable, symptomatic or H/H drops below 7/21.  - Patient has not received any PRBC transfusions to date  - Patient's anemia is currently stable

## 2025-04-18 NOTE — SUBJECTIVE & OBJECTIVE
Interval History: HyperNa (Na 150) yesterday for which pt received very gentle IVF (250cc LR over 1 hour). NAEON. This AM pt endorsing increased SOB and now requiring 3-4L NC. No crackles on auscultation, but decreased breath sounds up to mid-lung fields w/ trace-to-1+ edema of LEs. CXR ordered along w/ PRN nebs. May consider addn'l dose of diuretics today, but may worsen hyperNa. Urine studies ordered. May consider Nephro consult.     Review of Systems   Constitutional:  Positive for fatigue. Negative for chills, diaphoresis and fever.   HENT:  Positive for voice change. Negative for congestion, rhinorrhea, sore throat and trouble swallowing.    Respiratory:  Positive for shortness of breath. Negative for cough and wheezing.    Cardiovascular:  Positive for leg swelling. Negative for chest pain and palpitations.   Gastrointestinal:  Negative for abdominal pain, diarrhea, nausea and vomiting.   Genitourinary:  Negative for difficulty urinating, dysuria and hematuria.   Neurological:  Negative for dizziness, light-headedness and headaches.   Psychiatric/Behavioral:  Negative for agitation, confusion and sleep disturbance.        Objective:     Vital Signs (Most Recent):  Temp: 97 °F (36.1 °C) (04/18/25 0712)  Pulse: 75 (04/18/25 0800)  Resp: 18 (04/18/25 0712)  BP: 137/62 (04/18/25 0712)  SpO2: (!) 93 % (04/18/25 0815) Vital Signs (24h Range):  Temp:  [96.8 °F (36 °C)-97 °F (36.1 °C)] 97 °F (36.1 °C)  Pulse:  [57-78] 75  Resp:  [17-18] 18  SpO2:  [91 %-96 %] 93 %  BP: (114-154)/(55-67) 137/62     Weight: 73.4 kg (161 lb 13.1 oz)  Body mass index is 27.78 kg/m².    Intake/Output Summary (Last 24 hours) at 4/18/2025 1040  Last data filed at 4/18/2025 0715  Gross per 24 hour   Intake 240 ml   Output 1450 ml   Net -1210 ml         Physical Exam  Constitutional:       General: She is not in acute distress.     Appearance: She is not toxic-appearing or diaphoretic.   HENT:      Head: Normocephalic and atraumatic.       Mouth/Throat:      Mouth: Mucous membranes are moist.   Eyes:      General: No scleral icterus.     Conjunctiva/sclera: Conjunctivae normal.   Cardiovascular:      Rate and Rhythm: Normal rate and regular rhythm.      Heart sounds: Normal heart sounds. No murmur heard.  Pulmonary:      Breath sounds: Examination of the right-upper field reveals decreased breath sounds. Examination of the left-upper field reveals decreased breath sounds. Examination of the left-middle field reveals decreased breath sounds. Decreased breath sounds present. No wheezing, rhonchi or rales.   Abdominal:      General: Abdomen is flat. Bowel sounds are normal. There is no distension.      Palpations: Abdomen is soft.      Tenderness: There is no abdominal tenderness. There is no guarding.   Musculoskeletal:         General: No signs of injury.      Right lower leg: Edema (trace to 1+, pitting) present.      Left lower leg: Edema (trace to 1+, pitting) present.   Skin:     General: Skin is warm and dry.   Neurological:      General: No focal deficit present.      Mental Status: She is alert and oriented to person, place, and time. Mental status is at baseline.   Psychiatric:         Mood and Affect: Mood normal.         Behavior: Behavior normal.           Significant Labs: All pertinent labs within the past 24 hours have been reviewed.    Significant Imaging: I have reviewed all pertinent imaging results/findings within the past 24 hours.

## 2025-04-18 NOTE — NURSING
Incontinence pad saturated with moderate amount of urine d/t pure wick leaking. Patient changed and cleaned, new pure wick and incontinence pad placed. Awaiting urine to collect for sample.

## 2025-04-18 NOTE — ASSESSMENT & PLAN NOTE
"Most recent BNP and echo results are listed below.  No results for input(s): "BNP" in the last 72 hours.     Echo  Result Date: 4/7/2025    Left Ventricle: The left ventricle is normal in size. Normal wall   thickness. There is low normal systolic function with a visually estimated   ejection fraction of 50 - 55%. Grade II diastolic dysfunction.    Right Ventricle: The right ventricle is normal in size. Wall thickness   is normal. Systolic function is normal.    IVC/SVC: Patient is ventilated, cannot use IVC diameter to estimate   right atrial pressure.      Current Heart Failure Medications: have been held due to bradycardia and MARQUEZ     Plan  - Continue home bumex  - BP mgmt via hydralazine & metoprolol  - Repeat CXR ordered; consider extra dose of diuretics   - Monitor strict I&Os and daily weights.  - Monitor electrolytes, replete PRN for goal K > 4 & Mg > 2  - Sodium (2g/day) & fluid (1.8L/day) restriction   - Telemetry    "

## 2025-04-19 PROBLEM — A41.9 SEPSIS: Status: ACTIVE | Noted: 2025-04-19

## 2025-04-19 NOTE — PLAN OF CARE
Patient is AAOX3. Resp even and unlabored. C/o abdominal discomfort, simethicone and zofran administered and effective. Skin dry and warm to touch. Dermatitis to buttocks. 4l per nasal cannula. Purewick in place. Incont bowel.  Call light in reach. Safety maintained.      Problem: Skin Injury Risk Increased  Goal: Skin Health and Integrity  4/19/2025 1818 by Tameka Snider RN  Outcome: Progressing  4/19/2025 1818 by Tameka Snider RN  Outcome: Progressing     Problem: Adult Inpatient Plan of Care  Goal: Plan of Care Review  4/19/2025 1818 by Tameka Snider RN  Outcome: Progressing  4/19/2025 1818 by Tameka Snider RN  Outcome: Progressing  Goal: Patient-Specific Goal (Individualized)  4/19/2025 1818 by Tameka Snider RN  Outcome: Progressing  4/19/2025 1818 by Tameka Snider RN  Outcome: Progressing  Goal: Absence of Hospital-Acquired Illness or Injury  4/19/2025 1818 by Tameka Snider RN  Outcome: Progressing  4/19/2025 1818 by Tameka Snider RN  Outcome: Progressing  Goal: Optimal Comfort and Wellbeing  4/19/2025 1818 by Tameka Snider RN  Outcome: Progressing  4/19/2025 1818 by Tameka Snider RN  Outcome: Progressing  Goal: Readiness for Transition of Care  4/19/2025 1818 by Tameka Snider RN  Outcome: Progressing  4/19/2025 1818 by Tameka Snider RN  Outcome: Progressing     Problem: Fall Injury Risk  Goal: Absence of Fall and Fall-Related Injury  4/19/2025 1818 by Tameka Snider RN  Outcome: Progressing  4/19/2025 1818 by Tameka Snider RN  Outcome: Progressing     Problem: Acute Kidney Injury/Impairment  Goal: Fluid and Electrolyte Balance  4/19/2025 1818 by Tameka Snider RN  Outcome: Progressing  4/19/2025 1818 by Tameka Snider RN  Outcome: Progressing  Goal: Improved Oral Intake  4/19/2025 1818 by Tameka Snider RN  Outcome: Progressing  4/19/2025 1818 by Tameka Snider, RN  Outcome: Progressing  Goal: Effective Renal  Function  4/19/2025 1818 by Tameka Snider RN  Outcome: Progressing  4/19/2025 1818 by Tameka Snider RN  Outcome: Progressing     Problem: Delirium  Goal: Optimal Coping  4/19/2025 1818 by Tameka Snider RN  Outcome: Progressing  4/19/2025 1818 by Tameka Snider RN  Outcome: Progressing  Goal: Improved Behavioral Control  4/19/2025 1818 by Tameka Snider RN  Outcome: Progressing  4/19/2025 1818 by Tameka Snider RN  Outcome: Progressing  Goal: Improved Attention and Thought Clarity  4/19/2025 1818 by Tameka Snider RN  Outcome: Progressing  4/19/2025 1818 by Tameka Snider RN  Outcome: Progressing  Goal: Improved Sleep  4/19/2025 1818 by Tameka Snider RN  Outcome: Progressing  4/19/2025 1818 by Tameka Snider RN  Outcome: Progressing

## 2025-04-19 NOTE — CARE UPDATE
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    Last VS: BP (!) 121/57 (BP Location: Left arm, Patient Position: Lying)   Pulse 72   Temp (!) 92.9 °F (33.8 °C) (Axillary)   Resp 18   Ht 5' 4" (1.626 m)   Wt 73.4 kg (161 lb 13.1 oz)   SpO2 95%   Breastfeeding No   BMI 27.78 kg/m²     24H Vital Sign Range:  Temp:  [92 °F (33.3 °C)-97.8 °F (36.6 °C)]   Pulse:  [57-78]   Resp:  [16-18]   BP: (105-154)/(52-67)   SpO2:  [92 %-96 %]     Level of Consciousness (AVPU): alert    Recent Labs     04/17/25  0517 04/18/25  0538 04/18/25 2005   WBC 7.14 7.76 8.46   HGB 12.1 12.2 12.0   HCT 38.2 39.1 37.8    183 174       Recent Labs     04/17/25  0517 04/18/25  0538 04/18/25  1521   * 149* 152*   K 3.5 3.5 4.0   * 116* 117*   CO2 25 24 25   BUN 46* 47* 43*   CREATININE 1.2 1.1 1.0   PHOS  --  2.7  --    MG  --  2.0  --       OXYGEN:  Flow (L/min) (Oxygen Therapy): 3  Oxygen Concentration (%): 28       MEWS score: 3    K. Nir, PA contacted for hypothermia, jeffrey garibay applied. Septic workup initiated. Otherwise VSS. Lactic 1.1. Broad spectrum antibiotics started.  No additional concerns " verbalized at this time. Instructed to call [phone] for further concerns or assistance.    Swathi Cantu RN

## 2025-04-19 NOTE — TREATMENT PLAN
"  Patient hypothermic to temp 92.4, vitals otherwise stable. Appears that patient had increased oxygen needs today per chart review. CXR from earlier today shows "slightly increased associated underlying right lower and middle lung zone airspace opacities." Blood cx, lactic, procal, CBC ordered. Will start broad spectrum IV vanc and zosyn for now pending infectious work up given prolonged hospital course. Marco Antonio garibay ordered. Will notify rapid response.      Emmy Loyola PA-C  [facility]   "

## 2025-04-19 NOTE — CONSULTS
[facility]       Bed:304/304 A    20G x 1.75IN PIV placed in Right Forearm by OSCAR using Ultrasound Guidance.    Indication: PVA  Attempts: 1    Josefina Viera RN

## 2025-04-19 NOTE — NURSING
"1945  Assessment completed, see flowsheets. Currently c/o Headache rated 7/10-denies vision changes, oriented to all but day/date.  Noted low temp recorded by PCT-rectal temp 92.2-on call MD notified with new orders noted. Manny hugger ordered, hot packs and extra blankets applied while await delivery. Unsure of when she last had a BM but denies feeling constipated. Tele showing SR w/1st degree AVB-currently no paed beats noted. Discussed POC for this shift. Verbalizes understanding/agreement with no questions or concerns voiced. Call light within reach and bed alarm on, will continue to monitor.     0050 No void yet this shift. Bladder scan 277ml.     0120  Assumption pt yelling "help" from gillespie.  States "I'm hot". Oral temp now 95. Refusing blankets, manny hugger, and/or hot packs. VERY anxious. Sat at bedside holding pt hand and providing support. Relaxation encouraged. While at bedside pt voided 100 ml cloudy yellow urine. Samples collected and sent. Appears less anxious, will continue to monitor.     0202  c/o SOB, resp shallow and slightly labored. Breath sounds remain clear but diminished t/o. Sats on 3L NC 90-92%. Increased to 4L & pt encouraged to relax/take slow deep breaths. Sats increased to 94% & pt appears more comfortable. Will continue to monitor closely.     0225  now c/o upset stomach/nausea-no prn available. On call MD sent secure chat. Awaiting response.    0345  nausea improved but still with stomachache. Unable to really describe, feels "a little" bloated/gassy.  Temp 95.0 ax-continues to refuse manny hugger but allowed blankets to be placed. MD mccann request for simethicone. Will continue to monitor.     0448 Noted urine never picked up by . Called lab-no  tonight. Specimen walked to lab by Jose MONTES    0700 Simethicone helped abdominal discomfort. Now resting quietly with eyes closed. Report given and pt care endorsed at this time  "

## 2025-04-19 NOTE — PLAN OF CARE
Problem: Adult Inpatient Plan of Care  Goal: Plan of Care Review  Outcome: Progressing  Flowsheets (Taken 4/18/2025 1945)  Plan of Care Reviewed With: patient     Problem: Fall Injury Risk  Goal: Absence of Fall and Fall-Related Injury  Outcome: Progressing-safety precautions maintained t/o shift    Frequent rounds made    Bed alarm on      Problem: Wound  Goal: Improved Oral Intake  Outcome: Progressing     Problem: Acute Kidney Injury/Impairment  Goal: Fluid and Electrolyte Balance  Outcome: Progressing-1.8L/day fluid restriction

## 2025-04-19 NOTE — SUBJECTIVE & OBJECTIVE
"Interval History: Pt seen and examined this morning on rounds. Overnight events reviewed- hypothermia 92, sepsis of unclear source, possible PNA with infiltrates on CXR, CT chest pending.  Zosyn IV started. Remained hypothermic despite Zosyn x 2 doses and Manny hugger in place (pt uncomfortable/hot and asking for removal), so added Vanc IV STAT. Checked TSH and FT4-  wnl. UA with pyuria however not a clean catch w 33 sq epi, so repeated UA. C/o "stomach" pain, and TTP RUQ, RLQ, LLQ- DDx colitis, abscess, other-- changed CT chest to CT chest/abd/pelvis.  Poor UOP, only 200cc by mid-shift, and still hyperNa with FWD 1.8L so started gentle hydration with D5W @ 75cc for 24 hrs. May need to diurese again after sepsis resolves.     Pt is a watcher with low threshold for ICU consult.   Current Sx and overall picture with the "stomach" pain and septic picture is "exactly what led us to ICU 2 weeks ago" per daughter.     Care plan reviewed. Discussed with pt and daughter at bedside.     Review of Systems   All other systems reviewed and are negative.    Objective:     Vital Signs (Most Recent):  Temp: (!) 95 °F (35 °C) (04/19/25 0345)  Pulse: 82 (04/19/25 0733)  Resp: 18 (04/19/25 0733)  BP: (!) 110/53 (04/19/25 0733)  SpO2: 95 % (04/19/25 0733) Vital Signs (24h Range):  Temp:  [92 °F (33.3 °C)-97.8 °F (36.6 °C)] 95 °F (35 °C)  Pulse:  [57-84] 82  Resp:  [16-20] 18  SpO2:  [91 %-96 %] 95 %  BP: (105-140)/(52-63) 110/53     Weight: 73.4 kg (161 lb 13.1 oz)  Body mass index is 27.78 kg/m².    Intake/Output Summary (Last 24 hours) at 4/19/2025 0872  Last data filed at 4/19/2025 0405  Gross per 24 hour   Intake 278 ml   Output 300 ml   Net -22 ml         Physical Exam  Constitutional:       General: She is not in acute distress.     Appearance: She is not toxic-appearing or diaphoretic.   HENT:      Head: Normocephalic and atraumatic.      Mouth/Throat:      Mouth: Mucous membranes are moist.   Eyes:      General: No scleral " icterus.     Conjunctiva/sclera: Conjunctivae normal.   Cardiovascular:      Rate and Rhythm: Normal rate and regular rhythm.      Heart sounds: Normal heart sounds. No murmur heard.  Pulmonary:      Breath sounds: Examination of the right-upper field reveals decreased breath sounds. Examination of the left-upper field reveals decreased breath sounds. Examination of the left-middle field reveals decreased breath sounds. Decreased breath sounds present. No wheezing, rhonchi or rales.   Abdominal:      General: Abdomen is flat. There is no distension.      Palpations: Abdomen is soft.      Tenderness: There is abdominal tenderness. There is no guarding or rebound.   Musculoskeletal:         General: No signs of injury.      Right lower leg: Edema (trace to 1+, pitting) present.      Left lower leg: Edema (trace to 1+, pitting) present.   Skin:     General: Skin is warm and dry.   Neurological:      General: No focal deficit present.      Mental Status: She is alert and oriented to person, place, and time. Mental status is at baseline.   Psychiatric:         Mood and Affect: Mood normal.         Behavior: Behavior normal.               Significant Labs: All pertinent labs within the past 24 hours have been reviewed.    Significant Imaging: I have reviewed all pertinent imaging results/findings within the past 24 hours.

## 2025-04-19 NOTE — CARE UPDATE
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    Last VS: BP (!) 110/53 (BP Location: Left arm, Patient Position: Lying)   Pulse 71   Temp (!) 95.1 °F (35.1 °C) (Rectal)   Resp 18   Ht 5' 4" (1.626 m)   Wt 73.4 kg (161 lb 13.1 oz)   SpO2 95%   Breastfeeding No   BMI 27.78 kg/m²     24H Vital Sign Range:  Temp:  [92 °F (33.3 °C)-97.8 °F (36.6 °C)]   Pulse:  [60-84]   Resp:  [16-20]   BP: (105-140)/(52-63)   SpO2:  [91 %-96 %]     Level of Consciousness (AVPU): alert    Recent Labs     04/18/25  0538 04/18/25 2005 04/19/25  0823   WBC 7.76 8.46 7.83   HGB 12.2 12.0 11.7*   HCT 39.1 37.8 37.4    174 177       Recent Labs     04/18/25  0538 04/18/25  1521 04/19/25  0823   * 152* 149*   K 3.5 4.0 4.1   * 117* 118*   CO2 24 25 24   BUN 47* 43* 46*   CREATININE 1.1 1.0 1.2   PHOS 2.7  --  2.9   MG 2.0  --  1.9        No results for input(s): "PH", "PCO2", "PO2", "HCO3", "POCSATURATED", "BE" in the last 72 hours.     OXYGEN:  Flow (L/min) (Oxygen Therapy): 4  Oxygen Concentration (%): 28       MEWS score: 3    Rounding completed with charge nurse Claudette for am rounds reports pt " hypothermic, bear hugger in place. No additional concerns verbalized at this time. Instructed to call [phone] for further concerns or assistance.    Eric Alonso RN

## 2025-04-19 NOTE — CONSULTS
Pharmacokinetic Initial Assessment: IV Vancomycin    Assessment/Plan:    Vancomycin 1500 mg IV x 1   Obtain concentration in AM 4/20  Trough goal = 15-20 mcg/mL        Thank you for the consult, will continue to follow    Leif GoveaD., BCPS  [id number]         Patient brief summary:  Misa Barajas is a 79 y.o. female initiated on antimicrobial therapy with IV Vancomycin for treatment of suspected sepsis    Drug Allergies:   Review of patient's allergies indicates:   Allergen Reactions    Adhesive tape-silicones     Adhesive Rash     Pt states she removed a LATEX bandaid and the skin beneath was swollen and red. No other latex causes a reaction.       Actual Body Weight:   73 kg    Renal Function:   Estimated Creatinine Clearance: 36.1 mL/min (based on SCr of 1.2 mg/dL).,       CBC (last 72 hours):  Recent Labs   Lab Result Units 04/17/25 0517 04/18/25 0538 04/18/25 2005 04/19/25  0823   WBC K/uL 7.14 7.76 8.46 7.83   HGB gm/dL 12.1 12.2 12.0 11.7*   HCT % 38.2 39.1 37.8 37.4   Platelet Count K/uL 174 183 174 177   Lymph % % 14.8*  --  11.1*  --    Mono % % 9.8  --  11.0  --    Eos % % 3.5  --  2.1  --    Basophil % % 1.3  --  0.8  --        Metabolic Panel (last 72 hours):  Recent Labs   Lab Result Units 04/17/25  0517 04/18/25  0538 04/18/25  1521 04/19/25  0121 04/19/25  0823   Sodium mmol/L 150* 149* 152*  --  149*   Urine Sodium mmol/L  --   --   --  78  --    Potassium mmol/L 3.5 3.5 4.0  --  4.1   Chloride mmol/L 118* 116* 117*  --  118*   CO2 mmol/L 25 24 25  --  24   Glucose mg/dL 99 89 109  --  93   Glucose, UA   --   --   --  Negative  --    BUN mg/dL 46* 47* 43*  --  46*   Creatinine mg/dL 1.2 1.1 1.0  --  1.2   Urine Creatinine mg/dL  --   --   --  49.0  --    Albumin g/dL 2.5* 2.4*  --   --  2.4*   Bilirubin Total mg/dL 0.5  --   --   --   --    ALP unit/L 117  --   --   --   --    AST unit/L 32  --   --   --   --    ALT unit/L 16  --   --   --   --    Magnesium  mg/dL  --  2.0  --   --  1.9  "  Phosphorus Level mg/dL  --  2.7  --   --  2.9       Drug levels (last 3 results):  No results for input(s): "VANCOMYCINRA", "VANCORANDOM", "VANCOMYCINPE", "VANCOPEAK", "VANCOMYCINTR", "VANCOTROUGH" in the last 72 hours.    Microbiologic Results:  Microbiology Results (last 7 days)       Procedure Component Value Units Date/Time    Culture, Respiratory with Gram Stain [[id number]]     Order Status: Sent Specimen: Respiratory     Blood culture [[id number]]  (Normal) Collected: 04/18/25 2005    Order Status: Completed Specimen: Blood from Peripheral, Antecubital, Right Updated: 04/19/25 0602     Blood Culture No Growth After 6 Hours    Blood culture [[id number]]  (Normal) Collected: 04/18/25 2006    Order Status: Completed Specimen: Blood from Peripheral, Wrist, Right Updated: 04/19/25 0602     Blood Culture No Growth After 6 Hours    Urine culture [[id number]] Collected: 04/19/25 0121    Order Status: Sent Specimen: Urine Updated: 04/19/25 0525            "

## 2025-04-20 PROBLEM — R07.89 LEFT CHEST PRESSURE: Status: ACTIVE | Noted: 2025-04-20

## 2025-04-20 NOTE — PROGRESS NOTES
[facility] - Cardiology [facility] Medicine  Progress Note    Patient Name: Misa Barajas  MRN: [id number]  Patient Class: IP- Inpatient   Admission Date: 4/6/2025  Length of Stay: 13 days  Attending Physician: Ilene Wu MD  Primary Care Provider: Isela Langston MD    Principal Problem:Complete heart block    HPI:  Ms. Misa Barajas is a 78 yo female with COPD on 2L home O2, HFpEF, AFL s/p RFA (6/17/24 w/ Dr. Church), CKD III, HTN, HLD, chronic venous insufficiency, orthostatic hypotension who was brought via EMS after per history she was found confused and bradycardiac with HR in the 20s.They started epi and transcutaneous pacing. Given Versed for the transcutaneous pacing. They had to bag her in route. We do not have strip of underlying rhythm. Patient obtunded for which she was intubated for airway protection on arrival to the ED and transcutaneous pacing was exchanged to transvenous pacing via RIJ (threshold 0.6). Underlying rate 44 bifascicular block. Known RBBB. Per daughters patient had called them over the phone when they noticed she appeared confused. Due to concerns for a possible syncopal episode EMS was called. Prior to event patient had been complaining of malaise and nausea for which she took about two Zofran pills per the daughters.      She had been recently seen on 3/27/25 at the [facility] visit following recent admission for AHRF 2/2 ADHF and COPD exacerbation. On ED presentation BNP elevated to 2500, troponin elevated to 19, CXR with pulmonary edema. She was started on steroids, nebs and antibiotics in addition to IV diuresis. Updated TTE stable from prior, showing HFpEF with elevated CVP 15. Her O2 requirements improved to baseline with diuresis and she was subsequently discharged. At that visit patient had not required midodrine as blood pressure had remained stable and she was asymptomatic.      Off note, patient was taken off OAC due to bleeding issues and no recurrent of  "typical atrial flutter after ablation.     Overview/Hospital Course:  Admitted to CCU for CHB in s/o hyperkalemia & in the presence of conduction disease/RBBB & LAHB - stable w/ temporary wire. ??Septic shock deemed to be d/t UTI - ?? UA with only 7 WBCs); vasopressors weaned off. HTN; started NG gtt. Acute on chronic hypoxic/hypercapnic RF & obtunded; intubated. Successfully extubated 04/08. MARQUEZ on CKD; improved w/ good UOP response to diuresis. PVCs w/ intermittent pacing by TVP. Switched pip-tazo to amox-clav on 04/09; continued to improve; empiric EED 04/13 for 7 day course. Micra PM by EP placed 04/10. Medically stable for stepdown. Patient was extremely short of breath with physical therapy, chest x-ray concerning for pulmonary edema.  Started patient on IV diuresis, now euvolemic.  ENT was consulted yesterday due to hoarseness of voice.  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 Pt uninterested in any procedure at this time, ENT recommended SLP follow up.  Able to tolerate regular diet at this point.  Now agreeable for SNF, working on placement. Course c/b worsening hypoxia & hyperNa.       Interval History: Pt seen and examined this morning on rounds. Overnight events reviewed- hypothermia 92, sepsis of unclear source, possible PNA with infiltrates on CXR, CT chest pending.  Zosyn IV started. Remained hypothermic despite Zosyn x 2 doses and Manny hugger in place (pt uncomfortable/hot and asking for removal), so added Vanc IV STAT. Checked TSH and FT4-  wnl. UA with pyuria however not a clean catch w 33 sq epi, so repeated UA. C/o "stomach" pain, and TTP RUQ, RLQ, LLQ- DDx colitis, abscess, other-- changed CT chest to CT chest/abd/pelvis.  Poor UOP, only 200cc by mid-shift, and still hyperNa with FWD 1.8L so started gentle hydration with D5W @ 75cc for 24 hrs. May need to diurese again after sepsis resolves.     Pt is a watcher with low threshold for ICU consult. " "  Current Sx and overall picture with the "stomach" pain and septic picture is "exactly what led us to ICU 2 weeks ago" per daughter.     Care plan reviewed. Discussed with pt and daughter at bedside.     Review of Systems   All other systems reviewed and are negative.    Objective:     Vital Signs (Most Recent):  Temp: (!) 95 °F (35 °C) (04/19/25 0345)  Pulse: 82 (04/19/25 0733)  Resp: 18 (04/19/25 0733)  BP: (!) 110/53 (04/19/25 0733)  SpO2: 95 % (04/19/25 0733) Vital Signs (24h Range):  Temp:  [92 °F (33.3 °C)-97.8 °F (36.6 °C)] 95 °F (35 °C)  Pulse:  [57-84] 82  Resp:  [16-20] 18  SpO2:  [91 %-96 %] 95 %  BP: (105-140)/(52-63) 110/53     Weight: 73.4 kg (161 lb 13.1 oz)  Body mass index is 27.78 kg/m².    Intake/Output Summary (Last 24 hours) at 4/19/2025 0854  Last data filed at 4/19/2025 0405  Gross per 24 hour   Intake 278 ml   Output 300 ml   Net -22 ml         Physical Exam  Constitutional:       General: She is not in acute distress.     Appearance: She is not toxic-appearing or diaphoretic.   HENT:      Head: Normocephalic and atraumatic.      Mouth/Throat:      Mouth: Mucous membranes are moist.   Eyes:      General: No scleral icterus.     Conjunctiva/sclera: Conjunctivae normal.   Cardiovascular:      Rate and Rhythm: Normal rate and regular rhythm.      Heart sounds: Normal heart sounds. No murmur heard.  Pulmonary:      Breath sounds: Examination of the right-upper field reveals decreased breath sounds. Examination of the left-upper field reveals decreased breath sounds. Examination of the left-middle field reveals decreased breath sounds. Decreased breath sounds present. No wheezing, rhonchi or rales.   Abdominal:      General: Abdomen is flat. There is no distension.      Palpations: Abdomen is soft.      Tenderness: There is abdominal tenderness. There is no guarding or rebound.   Musculoskeletal:         General: No signs of injury.      Right lower leg: Edema (trace to 1+, pitting) present.      " "Left lower leg: Edema (trace to 1+, pitting) present.   Skin:     General: Skin is warm and dry.   Neurological:      General: No focal deficit present.      Mental Status: She is alert and oriented to person, place, and time. Mental status is at baseline.   Psychiatric:         Mood and Affect: Mood normal.         Behavior: Behavior normal.               Significant Labs: All pertinent labs within the past 24 hours have been reviewed.    Significant Imaging: I have reviewed all pertinent imaging results/findings within the past 24 hours.      Assessment & Plan  Complete heart block  RBBB  Shock  -Known RBBB. Admitted with CHB and shock.  Bradycardic with HR in the 20s on arrival, transcutaneous pacing/epi initiated, admitted to CCU  - bradycardia most likely in the setting of electrolyte disturbance and abnormal thyroid function  Plan:  -s/p micra PM placement 4/10 by EP  -correct electrolyte derangements  Sepsis  Recurrent on 4/18 pm with hypothermia 92 and "stomach" pain--> did not improve after Zosyn, added Vanc IV with improvement to 96. DDx intra-abd infection (colitis, abscess, gallstone pancreatitis, other), PNA, UTI, multifactorial, other. H/o MSSA/enterococcus/kleb UTI, cat scratch infection, gallbladder pancreatitis (Txed medically)  - Zosyn and Vanc IV   - f/u BCx closely  Hypernatremia  Very slowly uptrending Na since admission. Peak 150 on 4/17 for which pt received 250cc LR. Na 149 the following day & pt w/ increased O2 needs & LE edema. Unclear etiology as pt does not appear hypovolemic.   - encourage water PO  - D5W IV @ 75 cc/hr x 24 hrs  - Consider Nephrology consult  - Monitor BMP Q12-24H   Dysphonia  Paralysis of right vocal cord  -Persistent hoarseness since extubation on 4/8  -ENT consulted, status post laryngoscopy 4/15 which noted right TDC paralysis with mild hemorrhage of true cord, adequate glottic closure noted  -Pt uninterested in VC augmentation at this time, needs outpatient SLP " "follow up\  Typical atrial flutter  -s/p ablation 6/2024 without recurrence  -AC discontinued by outpatient EP provider  -Cont BB  CKD (chronic kidney disease) stage 3, GFR 30-59 ml/min  Creatine stable for now. BMP reviewed- noted Estimated Creatinine Clearance: 36.1 mL/min (based on SCr of 1.2 mg/dL). according to latest data. Based on current GFR, CKD stage is stage 4 - GFR 15-29.  Monitor UOP and serial BMP and adjust therapy as needed. Renally dose meds. Avoid nephrotoxic medications and procedures.    Centrilobular emphysema  Prior history of smoking. Continue scheduled inhalers once extubated. Antibiotics and Supplemental oxygen and monitor respiratory status closely.     Chronic respiratory failure with hypoxia  Patient with Hypoxic Respiratory failure which is Chronic.  she is on home oxygen at 2 LPM. Supplemental oxygen was provided and noted-       Signs/symptoms of respiratory failure include- tachypnea, increased work of breathing, use of accessory muscles, and lethargy. Contributing diagnoses includes - underlying COPD   -transition to home bumex    Acute on chronic congestive heart failure  Most recent BNP and echo results are listed below.  No results for input(s): "BNP" in the last 72 hours.     Echo  Result Date: 4/7/2025    Left Ventricle: The left ventricle is normal in size. Normal wall   thickness. There is low normal systolic function with a visually estimated   ejection fraction of 50 - 55%. Grade II diastolic dysfunction.    Right Ventricle: The right ventricle is normal in size. Wall thickness   is normal. Systolic function is normal.    IVC/SVC: Patient is ventilated, cannot use IVC diameter to estimate   right atrial pressure.      Current Heart Failure Medications: have been held due to bradycardia and MARQUEZ     Plan  - HOLD home Bumex for now while tx-ing sepsis, resume asap  - HOLD home hydralazine for same reason  - cont hoem metop for rate control in afib  - Monitor strict I&Os and " daily weights.  - Monitor electrolytes, replete PRN for goal K > 4 & Mg > 2  - Telemetry    Orthostatic hypotension  holding midodrine and per history patient had not required it since last hospitalization     Iron deficiency anemia due to chronic blood loss  Anemia is likely due to chronic disease due to Chronic Kidney Disease. Most recent hemoglobin and hematocrit are listed below.  Recent Labs     04/18/25  0538 04/18/25 2005 04/19/25  0823   HGB 12.2 12.0 11.7*   HCT 39.1 37.8 37.4     Plan  - Monitor serial CBC: Daily  - Transfuse PRBC if patient becomes hemodynamically unstable, symptomatic or H/H drops below 7/21.  - Patient has not received any PRBC transfusions to date  - Patient's anemia is currently stable    VTE Risk Mitigation (From admission, onward)           Ordered     IP VTE HIGH RISK PATIENT  Once         04/06/25 0512     Place sequential compression device  Until discontinued         04/06/25 0454                    Discharge Planning   NOHEMI: 4/21/2025     Code Status: Full Code   Medical Readiness for Discharge Date:   Discharge Plan A: Skilled Nursing Facility   Discharge Delays: (!) Payor Issues          Ilene Wu MD  Department of Hospital Medicine   [facility] - [facility]iology Stepdown

## 2025-04-20 NOTE — NURSING
Temperature closely monitored. Patient less agitated at this time. Bear hugger reapplied. Patient repositioned. VSS

## 2025-04-20 NOTE — ASSESSMENT & PLAN NOTE
-Known RBBB. Admitted with CHB and shock.  Bradycardic with HR in the 20s on arrival, transcutaneous pacing/epi initiated, admitted to CCU  - bradycardia most likely in the setting of electrolyte disturbance and abnormal thyroid function  Plan:  -s/p micra PM placement 4/10 by EP  -correct electrolyte derangements

## 2025-04-20 NOTE — PROGRESS NOTES
Pharmacokinetic Assessment Follow Up: IV Vancomycin    Vancomycin serum concentration assessment(s):    Vanc R = 16.8 mcg/mL. SCR trending up to 1.5, only 200 mL UOP documented    Vancomycin Regimen Plan:  Vancomycin 750 mg IV x 1  Obtain concentration in AM 4/21  Goal trough = 15-20 mcg/mL    Thank you for the consult, will continue to follow    Akbar Govea.D., BCPS  [id number]         Patient brief summary:  Misa Barajas is a 79 y.o. female initiated on antimicrobial therapy with IV Vancomycin for treatment of sepsis      Drug levels (last 3 results):  Recent Labs   Lab Result Units 04/20/25 0412   Vancomycin Random ug/ml 16.8       Drug Allergies:   Review of patient's allergies indicates:   Allergen Reactions    Adhesive tape-silicones     Adhesive Rash     Pt states she removed a LATEX bandaid and the skin beneath was swollen and red. No other latex causes a reaction.       Actual Body Weight:   73 kg    Renal Function:   Estimated Creatinine Clearance: 28.9 mL/min (A) (based on SCr of 1.5 mg/dL (H)).,       CBC (last 72 hours):  Recent Labs   Lab Result Units 04/18/25  0538 04/18/25 2005 04/19/25  0823 04/20/25  0411   WBC K/uL 7.76 8.46 7.83 10.94   HGB gm/dL 12.2 12.0 11.7* 11.5*   HCT % 39.1 37.8 37.4 37.2   Platelet Count K/uL 183 174 177 175   Lymph % %  --  11.1*  --   --    Mono % %  --  11.0  --   --    Eos % %  --  2.1  --   --    Basophil % %  --  0.8  --   --        Metabolic Panel (last 72 hours):  Recent Labs   Lab Result Units 04/18/25  0538 04/18/25  1521 04/19/25  0121 04/19/25  0823 04/19/25  1015 04/20/25  0412   Sodium mmol/L 149* 152*  --  149*  --  145   Urine Sodium mmol/L  --   --  78  --   --   --    Potassium mmol/L 3.5 4.0  --  4.1  --  3.9   Chloride mmol/L 116* 117*  --  118*  --  113*   CO2 mmol/L 24 25  --  24  --  24   Glucose mg/dL 89 109  --  93  --  84   Glucose, UA   --   --  Negative  --  Negative  --    BUN mg/dL 47* 43*  --  46*  --  44*   Creatinine mg/dL 1.1 1.0   --  1.2  --  1.5*   Urine Creatinine mg/dL  --   --  49.0  --   --   --    Albumin g/dL 2.4*  --   --  2.4*  --  2.2*   Magnesium  mg/dL 2.0  --   --  1.9  --  2.1   Phosphorus Level mg/dL 2.7  --   --  2.9  --  3.1       Vancomycin Administrations:  vancomycin given in the last 96 hours                     vancomycin 1,500 mg in 0.9% NaCl 250 mL IVPB (admixture device) (mg) 1,500 mg New Bag 04/19/25 1106                    Microbiologic Results:  Microbiology Results (last 7 days)       Procedure Component Value Units Date/Time    Blood culture [[id number]]  (Normal) Collected: 04/18/25 2005    Order Status: Completed Specimen: Blood from Peripheral, Antecubital, Right Updated: 04/20/25 0003     Blood Culture No Growth After 24 Hours    Blood culture [[id number]]  (Normal) Collected: 04/18/25 2006    Order Status: Completed Specimen: Blood from Peripheral, Wrist, Right Updated: 04/20/25 0003     Blood Culture No Growth After 24 Hours    Urine culture [[id number]] Collected: 04/19/25 1015    Order Status: Sent Specimen: Urine Updated: 04/19/25 1055    Culture, Respiratory with Gram Stain [[id number]]     Order Status: Sent Specimen: Respiratory     Urine culture [[id number]] Collected: 04/19/25 0121    Order Status: Sent Specimen: Urine Updated: 04/19/25 0534

## 2025-04-20 NOTE — PLAN OF CARE
Problem: Skin Injury Risk Increased  Goal: Skin Health and Integrity  Outcome: Progressing     Problem: Adult Inpatient Plan of Care  Goal: Plan of Care Review  Outcome: Progressing  Goal: Patient-Specific Goal (Individualized)  Outcome: Progressing  Goal: Absence of Hospital-Acquired Illness or Injury  Outcome: Progressing  Goal: Optimal Comfort and Wellbeing  Outcome: Progressing  Goal: Readiness for Transition of Care  Outcome: Progressing     Problem: Fall Injury Risk  Goal: Absence of Fall and Fall-Related Injury  Outcome: Progressing     Problem: Infection  Goal: Absence of Infection Signs and Symptoms  Outcome: Progressing     Problem: Wound  Goal: Optimal Coping  Outcome: Progressing  Goal: Optimal Functional Ability  Outcome: Progressing  Goal: Absence of Infection Signs and Symptoms  Outcome: Progressing  Goal: Improved Oral Intake  Outcome: Progressing  Goal: Optimal Pain Control and Function  Outcome: Progressing  Goal: Skin Health and Integrity  Outcome: Progressing  Goal: Optimal Wound Healing  Outcome: Progressing     Problem: Acute Kidney Injury/Impairment  Goal: Fluid and Electrolyte Balance  Outcome: Progressing  Goal: Improved Oral Intake  Outcome: Progressing  Goal: Effective Renal Function  Outcome: Progressing     Problem: Delirium  Goal: Optimal Coping  Outcome: Progressing  Goal: Improved Behavioral Control  Outcome: Progressing  Goal: Improved Attention and Thought Clarity  Outcome: Progressing  Goal: Improved Sleep  Outcome: Progressing     Problem: Sepsis/Septic Shock  Goal: Optimal Coping  Outcome: Progressing  Goal: Absence of Bleeding  Outcome: Progressing  Goal: Blood Glucose Level Within Targeted Range  Outcome: Progressing  Goal: Absence of Infection Signs and Symptoms  Outcome: Progressing  Goal: Optimal Nutrition Intake  Outcome: Progressing

## 2025-04-20 NOTE — NURSING
"Patient agitated refusing PO meds or nutritional intake/fluids.   Voiding with external catheter in place.   Is able to tell me who she is , year and place. Not aware of situation. Continues to yell out repeatedly "help" myself and other nurses assess her and patient states " I am in AFIB" , I ask how I can help her, reassure she is in hospital and safe. Patient states "I don't know" .   Patient fearful of being alone. Reassured I am here for her. Notified MARISA Loyola of situation, she will come to assess patient.   Bed locked and in lowest position. Call light within reach. Ongoing continuous cardiac monitoring, denies pain. O2 nasal cannula in place. Ivs patent, infusing antibiotics as scheduled. Bed alarm on. Room near nurses station.    "

## 2025-04-20 NOTE — PLAN OF CARE
Pt seen/examined on am rounds. Full note to follow.     Mental status much improved today, however still with mild confusion and feels very tired.   Pt decompensated further today with recurrent MARQUEZ, C/o new SOB and hypoxemic 90% on 4L (over her baseline of 2L at home), improved to >94% on 6L with improvement of SOB.  C/o new L chest pressure/pain 4/10 (non-radiating), c/f MI or PE, resolved after nitro SL x 1 however HypoT with SBP upper 80s so will avoid further nitro.  HS trop 38 (decreased from prior), BNP 700s (increased from prior).CXR with pulm edema, all most c/w ADHF- Lasix 40 IV x 1 given.    Concern for untreated sepsis with ongoing hypothermia requiring jeffrey hugger despite Vanc/Zosyn for over 24 hours, so broadened to Vanc/Sher/Dinah, consulted ID, repeated BCx x 2. Lactate wnl.  Re: concern for PE, Known LIJ DVT, apixaban x several days then stopped (likely for hemorrhage of vocal fold)- discussed w ENT 4/20, ok to resume anticoag if needed. PE workup- F/u STAT UE and LE Dopplers. Unable to check CTA chest given MARQUEZ, unable to check V/Q given pulm edema. Anticoagulation with Apixaban. If no improvement over next 12-24 hours, Pall Care consult- pt plugged in with [facility] outpt, has had 2 virtual visits w Dr Mary Velasquez.     Discussed with dtmacarena Gray in person in am, discussed with Marina and Esther in pm on conference call.

## 2025-04-20 NOTE — SUBJECTIVE & OBJECTIVE
Interval History: Mental status much improved today, however still with mild confusion and feels very tired.   Pt decompensated further today with recurrent MARQUEZ, C/o new SOB and hypoxemic 90% on 4L (over her baseline of 2L at home), improved to >94% on 6L with improvement of SOB.  C/o new L chest pressure/pain 4/10 (non-radiating), c/f MI or PE, resolved after nitro SL x 1 however HypoT with SBP upper 80s so will avoid further nitro.  HS trop 38 (decreased from prior), BNP 700s (increased from prior).CXR with pulm edema, all most c/w ADHF- Lasix 40 IV x 1 given.    Concern for untreated sepsis with ongoing hypothermia requiring jeffrey hugger despite Vanc/Zosyn for over 24 hours, so broadened to Vanc/Sher/Dinah, consulted ID, repeated BCx x 2. Lactate wnl.  Re: concern for PE, Known LIJ DVT, apixaban x several days then stopped (likely for hemorrhage of vocal fold)- discussed w ENT 4/20, ok to resume anticoag if needed. PE workup- F/u STAT UE and LE Dopplers. Unable to check CTA chest given MARQUEZ, unable to check V/Q given pulm edema. Anticoagulation with Apixaban. If no improvement over next 12-24 hours, Pall Care consult- pt plugged in with Pall Care outpt, has had 2 virtual visits w Dr Mary Velasquez.      Discussed with dtmacarena Gray in person in am, discussed with Marina and Esther in pm on conference call.     Review of Systems   All other systems reviewed and are negative.    Objective:     Vital Signs (Most Recent):  Temp: 97.5 °F (36.4 °C) (04/20/25 0720)  Pulse: 84 (04/20/25 0734)  Resp: 17 (04/20/25 0720)  BP: (!) 122/58 (04/20/25 0720)  SpO2: (!) 90 % (04/20/25 0720) Vital Signs (24h Range):  Temp:  [95.1 °F (35.1 °C)-98.6 °F (37 °C)] 97.5 °F (36.4 °C)  Pulse:  [70-87] 84  Resp:  [17-18] 17  SpO2:  [90 %-95 %] 90 %  BP: ()/(51-58) 122/58     Weight: 73.4 kg (161 lb 13.1 oz)  Body mass index is 27.78 kg/m².    Intake/Output Summary (Last 24 hours) at 4/20/2025 0931  Last data filed at 4/19/2025 1017  Gross per  24 hour   Intake --   Output 200 ml   Net -200 ml         Physical Exam  Constitutional:       General: She is not in acute distress.     Appearance: She is not toxic-appearing or diaphoretic.   HENT:      Head: Normocephalic and atraumatic.      Mouth/Throat:      Mouth: Mucous membranes are moist.   Eyes:      General: No scleral icterus.     Conjunctiva/sclera: Conjunctivae normal.   Cardiovascular:      Rate and Rhythm: Normal rate and regular rhythm.      Heart sounds: Normal heart sounds. No murmur heard.  Pulmonary:      Breath sounds: Examination of the right-upper field reveals decreased breath sounds. Examination of the left-upper field reveals decreased breath sounds. Examination of the left-middle field reveals decreased breath sounds. Decreased breath sounds present. No wheezing, rhonchi or rales.   Abdominal:      General: Abdomen is flat. There is no distension.      Palpations: Abdomen is soft.      Tenderness: There is abdominal tenderness. There is no guarding or rebound.   Musculoskeletal:         General: No signs of injury.      Right lower leg: Edema (trace to 1+, pitting) present.      Left lower leg: Edema (trace to 1+, pitting) present.   Skin:     General: Skin is warm and dry.   Neurological:      General: No focal deficit present.      Mental Status: She is alert and oriented to person, place, and time. Mental status is at baseline.   Psychiatric:         Mood and Affect: Mood normal.         Behavior: Behavior normal.               Significant Labs: All pertinent labs within the past 24 hours have been reviewed.    Significant Imaging: I have reviewed all pertinent imaging results/findings within the past 24 hours.

## 2025-04-20 NOTE — ASSESSMENT & PLAN NOTE
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

## 2025-04-20 NOTE — ASSESSMENT & PLAN NOTE
"Per Endocrine consult, "Mild, subclinical hypothyroidism - unlikely to be the cause of shock or mental status changes especially given normal fT4. Can start low dose LT4 per OGT, but in cardiac pts and elderly, better to under treat rather than over-treat -- therefore conservative/low dose LT4 recommended. Needs TSH repeated outpatient in 4wks."    "

## 2025-04-20 NOTE — ASSESSMENT & PLAN NOTE
Very slowly uptrending Na since admission. Peak 150 on 4/17 for which pt received 250cc LR. Na 149 the following day & pt w/ increased O2 needs & LE edema. Unclear etiology as pt does not appear hypovolemic.   - encourage water PO  - D5W IV @ 75 cc/hr x 24 hrs  - Consider Nephrology consult  - Monitor BMP Q12-24H

## 2025-04-20 NOTE — CONSULTS
[facility] VASCULAR ACCESS NOTE       Bed:304/304 A    [facility] consulted for second PIV access using ultrasound guidance.    Per MD, second PIV not needed at this time.    Patient has adequate access at this time.    Re consult NIAS if needed.    Josefina Viera RN

## 2025-04-20 NOTE — ASSESSMENT & PLAN NOTE
"Most recent BNP and echo results are listed below.  No results for input(s): "BNP" in the last 72 hours.     Echo  Result Date: 4/7/2025    Left Ventricle: The left ventricle is normal in size. Normal wall   thickness. There is low normal systolic function with a visually estimated   ejection fraction of 50 - 55%. Grade II diastolic dysfunction.    Right Ventricle: The right ventricle is normal in size. Wall thickness   is normal. Systolic function is normal.    IVC/SVC: Patient is ventilated, cannot use IVC diameter to estimate   right atrial pressure.      Current Heart Failure Medications: have been held due to bradycardia and MARQUEZ     Plan  - HOLD home Bumex for now while tx-ing sepsis, resume asap  - HOLD home hydralazine for same reason  - cont hoem metop for rate control in afib  - Monitor strict I&Os and daily weights.  - Monitor electrolytes, replete PRN for goal K > 4 & Mg > 2  - Telemetry    "

## 2025-04-20 NOTE — PROGRESS NOTES
04/20/25 1830   Vital Signs   Temp (!) 95.7 °F (35.4 °C)   Temp Source Rectal     Manny hugger temp increased.

## 2025-04-20 NOTE — PROGRESS NOTES
04/20/25 1625 04/20/25 1645   Vital Signs   Temp (!) 94.4 °F (34.7 °C) (!) 94.4 °F (34.7 °C)   Temp Source Rectal Rectal     Manny lani optimized, notified Dr. Wu. Orders placed from broader abx, blood cx. All other vitals stable. Will monitor temp closely.

## 2025-04-20 NOTE — ASSESSMENT & PLAN NOTE
"Recurrent on 4/18 pm with hypothermia 92 and "stomach" pain--> did not improve after Zosyn, added Vanc IV with improvement to 96. DDx intra-abd infection (colitis, abscess, gallstone pancreatitis, other), PNA, UTI, multifactorial, other. H/o MSSA/enterococcus/kleb UTI, cat scratch infection, gallbladder pancreatitis (Txed medically)  - Zosyn and Vanc IV   - f/u BCx closely  "

## 2025-04-20 NOTE — ASSESSMENT & PLAN NOTE
Anemia is likely due to chronic disease due to Chronic Kidney Disease. Most recent hemoglobin and hematocrit are listed below.  Recent Labs     04/18/25  0538 04/18/25 2005 04/19/25  0823   HGB 12.2 12.0 11.7*   HCT 39.1 37.8 37.4     Plan  - Monitor serial CBC: Daily  - Transfuse PRBC if patient becomes hemodynamically unstable, symptomatic or H/H drops below 7/21.  - Patient has not received any PRBC transfusions to date  - Patient's anemia is currently stable

## 2025-04-20 NOTE — PROGRESS NOTES
04/20/25 0047   Vital Signs   Temp 98.5 °F (36.9 °C)   Temp Source Oral   Pulse 83   Heart Rate Source Monitor   Resp 18   SpO2 (!) 91 %   BP (!) 114/56   MAP (mmHg) 80   BP Location Right arm   Patient Position Lying

## 2025-04-20 NOTE — PROGRESS NOTES
04/19/25 1946   Vital Signs   Temp 97.8 °F (36.6 °C)   Temp Source Oral   Pulse 79   Heart Rate Source Monitor   Resp 18   SpO2 (!) 92 %   BP (!) 99/51   MAP (mmHg) 73   BP Location Right arm   Patient Position Lying

## 2025-04-21 PROBLEM — M25.562 PAIN AND SWELLING OF LEFT KNEE: Status: ACTIVE | Noted: 2025-04-21

## 2025-04-21 PROBLEM — I82.622 ACUTE DEEP VEIN THROMBOSIS (DVT) OF LEFT UPPER EXTREMITY: Status: ACTIVE | Noted: 2025-04-21

## 2025-04-21 PROBLEM — T68.XXXA HYPOTHERMIA: Status: ACTIVE | Noted: 2025-04-21

## 2025-04-21 PROBLEM — M25.462 PAIN AND SWELLING OF LEFT KNEE: Status: ACTIVE | Noted: 2025-04-21

## 2025-04-21 PROBLEM — I26.99 ACUTE PULMONARY EMBOLISM: Status: ACTIVE | Noted: 2025-04-21

## 2025-04-21 NOTE — CONSULTS
"[facility] - Cardiology Stepdown  Infectious Disease  Consult Note    Patient Name: Misa Barajas  MRN: [id number]  Admission Date: 4/6/2025  Hospital Length of Stay: 15 days  Attending Physician: Ilene Wu MD  Primary Care Provider: Isela Langston MD     Isolation Status: No active isolations    Patient information was obtained from patient, relative(s), and ER records.      Inpatient consult to Infectious Diseases  Consult performed by: Maria Luisa Myles DO  Consult ordered by: Ilene Wu MD        Assessment/Plan:     Other  Hypothermia  79F initially admitted for complete heart block requiring temporary pacing, also requiring intubation and pressors, has since been downgraded from ICU, has had hypothermic readings for past couple of days, so abx broadened to vanc/richi/lillie. She reports some nausea and L knee pain, no other symptoms. Admission BCX negative, and 4/18 and 4/20 NG. 4/19 UCX with GNR and E faecium (no symptoms). Has nonocclusive thrombi of L IJ and cephalic veins. CXR with pulmonary congestion. L knee XR with small suprapatellar bursa effusion.    Recommendations  Stop micafungin, no convincing evidence of fungal infection and is low-risk for fungemia  Continue vancomycin and meropenem for now  Follow up BCX and UCX        Thank you for your consult. I will follow-up with patient. Please contact us if you have any additional questions.    Maria Luisa Myles DO  Infectious Disease  [facility] Stepdown    Subjective:     Principal Problem: Complete heart block    HPI: Ms. Barajas is a 79F with PMH of HFpEF, COPD on 2L, aflutter s/p ablation, CKD3, HTN, HLD, chronic venous insufficiency, admitted for complete heart block requiring temporary pacing, also requiring intubation and pressors, has since been downgraded from ICU. Infectious disease consulted for "Recurrent sepsis with hypothermia, etiology unclear, possible PNA, possible UTUI, ongoing hypothermia requiring jeffrey hugger " "despite over 24 hrs on Zosyn and Vanc. Repeating BCx, broadening to Vanc/Sher/Dinah for now".     She reports some nausea and L knee pain, no other symptoms. Admission BCX negative, and 4/18 and 4/20 NG. 4/19 UCX with GNR and E faecium. Has nonocclusive thrombi of L IJ and cephalic veins. CXR with pulmonary congestion. L knee XR with small suprapatellar bursa effusion.     Past Medical History:   Diagnosis Date    Acute biliary pancreatitis 07/27/2024    Acute pancreatitis 07/27/2024    Anemia 07/12/2024    Aortic atherosclerosis     Arthritis     knee joint pain    Asthma-COPD overlap syndrome 08/22/2022    home o2    Breast cancer 2002    left breast & lymph nodes-s/p sx with chemo    Cataracts, bilateral     Chronic diastolic heart failure 12/24/2019    Chronic kidney disease, stage 3     Class 1 obesity due to excess calories with serious comorbidity and body mass index (BMI) of 30.0 to 30.9 in adult 05/16/2024    Gram-negative bacteremia - Pasturella multocida 07/12/2024    History of chemotherapy     last treatment 12/2002 (had 8 treatments)    Hyperlipidemia 11/20/2016    Hypertension     Lymphedema of both lower extremities 01/25/2022    Nephrolithiasis 06/02/2014    Osteoporosis     Paroxysmal atrial fibrillation 06/07/2021    Renal osteodystrophy 01/14/2016    Sepsis 4/19/2025    Shock 04/06/2025    Subclinical hypothyroidism 04/06/2025    Venous stasis dermatitis of both lower extremities 01/25/2022    Vitamin D insufficiency        Past Surgical History:   Procedure Laterality Date    ABLATION, ATRIAL FLUTTER, TYPICAL N/A 6/17/2024    Procedure: Ablation, Atrial Flutter, Typical;  Surgeon: Taz Church MD;  Location: [facility] EP LAB;  Service: Cardiology;  Laterality: N/A;  AFL, RFA, LORENE, MAKAYLA (cx if SR), LEANNE miller, 3 Prep    BREAST BIOPSY Left 2002    core bx, +    BREAST BIOPSY Right 2018    core    BREAST BIOPSY Right 2019    BREAST LUMPECTOMY Left 2002    CYSTOSCOPY  4/28/2022    Procedure: " CYSTOSCOPY;  Surgeon: Vik Ware MD;  [facility];  Service: Urology;;    CYSTOSCOPY  5/30/2022    Procedure: CYSTOSCOPY;  Surgeon: Bonnie Knutson MD;  Location: [facility] OR [facility] FLR;  Service: Urology;;    ECHOCARDIOGRAM,TRANSESOPHAGEAL N/A 6/17/2024    Procedure: Transesophageal echo (MAKAYLA) intra-procedure log documentation;  Surgeon: Nestor Martinez MD;  Location: [facility] EP LAB;  Service: Cardiology;  Laterality: N/A;    ERCP N/A 7/30/2024    Procedure: ERCP (ENDOSCOPIC RETROGRADE CHOLANGIOPANCREATOGRAPHY);  Surgeon: Sierra Cotto MD;  Location: [facility] ENDO (2ND FLR);  Service: Endoscopy;  Laterality: N/A;    IMPLANTATION OF LEADLESS PACEMAKER N/A 4/10/2025    Procedure: INSERTION, CARDIAC PACEMAKER, LEADLESS;  Surgeon: CAROLINE Solis MD;  Location: [facility] EP LAB;  Service: Cardiology;  Laterality: N/A;  CHB, MICRA leadless PPM, MDT, Anes, EH, CICU 3073    LASER LITHOTRIPSY  5/30/2022    Procedure: LITHOTRIPSY, USING LASER;  Surgeon: Bonnie Knutson MD;  Location: [address];  Service: Urology;;    LITHOTRIPSY      MASTECTOMY Left 06/2002    left-& lymph node dissection    REMOVAL, PACEMAKER, TEMPORARY TRANSVENOUS  4/10/2025    Procedure: Removal, Pacemaker, Temporary Transvenous;  Surgeon: CAROLINE Solis MD;  Location: [facility] EP LAB;  Service: Cardiology;;    REPLACEMENT OF STENT Right 5/30/2022    Procedure: REPLACEMENT, STENT;  Surgeon: Bonnie Knutson MD;  [facility];  Service: Urology;  Laterality: Right;    RETROGRADE PYELOGRAPHY Right 4/28/2022    Procedure: PYELOGRAM, RETROGRADE;  Surgeon: Vki Ware MD;  [facility];  Service: Urology;  Laterality: Right;    ROBOT-ASSISTED LAPAROSCOPIC PARTIAL NEPHRECTOMY USING DA JESÚS XI Right 3/11/2021    Procedure: XI ROBOTIC NEPHRECTOMY, PARTIAL;  Surgeon: Taz Ortega MD;  Location: [facility] OR [address] [place] [address];  Service: Urology;  Laterality: Right;  4hr/ gen with regional  [place]  "confirmation:  [id number] for 11:15am case NC    URETEROSCOPIC REMOVAL OF URETERIC CALCULUS Right 5/30/2022    Procedure: REMOVAL, CALCULUS, URETER, URETEROSCOPIC;  Surgeon: Bonnie Knutson MD;  Location: [facility] OR [address] [place] [address];  Service: Urology;  Laterality: Right;    ureteroscopy Bilateral     6.14    URETEROSCOPY Right 5/30/2022    Procedure: URETEROSCOPY;  Surgeon: Bonnie Knutson MD;  Location: [facility] OR [address] [place] [address];  Service: Urology;  Laterality: Right;       Review of patient's allergies indicates:   Allergen Reactions    Adhesive tape-silicones     Adhesive Rash     Pt states she removed a LATEX bandaid and the skin beneath was swollen and red. No other latex causes a reaction.       Medications:  Medications Prior to Admission   Medication Sig    budesonide-glycopyr-formoterol (BREZTRI AEROSPHERE) 160-9-4.8 mcg/actuation HFAA Inhale 2 puffs into the lungs 2 (two) times daily.    bumetanide (BUMEX) 1 MG tablet Take 1 tablet (1 mg total) by mouth once daily. Okay to take bumex 1mg twice daily as needed for worsening shortness of breath, swelling or 3lb weight gain    ferrous sulfate (FEOSOL) 325 mg (65 mg iron) Tab tablet Take 1 tablet (325 mg total) by mouth every other day.    meclizine (ANTIVERT) 12.5 mg tablet Take 1 tablet (12.5 mg total) by mouth Daily.    metoprolol succinate (TOPROL-XL) 25 MG 24 hr tablet Take ½ tablet (12.5 mg total) by mouth once daily.    midodrine (PROAMATINE) 2.5 MG Tab Take 1 tablet (2.5 mg total) by mouth 3 (three) times daily.    potassium chloride (MICRO-K) 10 MEQ CpSR Take 2 capsules (20 mEq total) by mouth once daily.     Antibiotics (From admission, onward)      Start     Stop Route Frequency Ordered    04/20/25 1745  meropenem (MERREM) 1 g in 0.9% NaCl 100 mL IVPB (MB+)         -- IV Every 12 hours (non-standard times) 04/20/25 1636    04/19/25 0941  vancomycin - pharmacy to dose  (vancomycin IVPB (PEDS and ADULTS))        Placed in "And" Linked Group    -- IV pharmacy " to manage frequency 04/19/25 0842          Antifungals (From admission, onward)      None          Antivirals (From admission, onward)      None             Immunization History   Administered Date(s) Administered    COVID-19, MRNA, LN-S, PF (Pfizer) (Purple Cap) 01/13/2021, 02/04/2021, 12/10/2021    COVID-19, mRNA, LNP-S, bivalent booster, PF (PFIZER OMICRON) 09/18/2024    Influenza 10/05/2009, 10/12/2011, 09/10/2014, 09/12/2019    Influenza (FLUAD) - Quadrivalent - Adjuvanted - PF *Preferred* (65+) 10/08/2020, 10/20/2022    Influenza - Quadrivalent - High Dose - PF (65 years and older) 10/22/2021, 11/08/2023    Influenza - Quadrivalent - PF *Preferred* (6 months and older) 10/05/2009, 10/12/2011, 09/10/2014    Influenza - Trivalent - Afluria, Fluzone MDV 10/05/2009, 10/12/2011, 09/10/2014    Influenza - Trivalent - Fluad - Adjuvanted - PF (65 years and older 09/12/2019    Influenza - Trivalent - Fluzone High Dose - PF (65 years and older) 09/23/2015, 09/14/2016, 09/05/2017, 09/12/2018, 10/08/2020    Influenza Split 09/10/2014    PPD Test 08/08/2024    Pneumococcal Conjugate - 13 Valent 01/14/2016    Pneumococcal Polysaccharide - 23 Valent 09/30/2014    Tdap 08/08/2016       Family History       Problem Relation (Age of Onset)    Arthritis Mother, Sister    Bone cancer Mother    Breast cancer Mother    Cancer Father    Crohn's disease Daughter    Fibroids Daughter    No Known Problems Brother, Daughter          Social History     Socioeconomic History    Marital status:    Tobacco Use    Smoking status: Former     Current packs/day: 0.00     Average packs/day: 1 pack/day for 50.0 years (50.0 ttl pk-yrs)     Types: Cigarettes     Start date: 1960     Quit date: 5/20/2009     Years since quitting: 15.9    Smokeless tobacco: Former   Substance and Sexual Activity    Alcohol use: No    Drug use: No    Sexual activity: Not Currently     Partners: Male     Birth control/protection: Partner-Vasectomy     Social  Drivers of Health     Financial Resource Strain: Patient Unable To Answer (4/7/2025)    Overall Financial Resource Strain (CARDIA)     Difficulty of Paying Living Expenses: Patient unable to answer   Food Insecurity: Patient Unable To Answer (4/7/2025)    Hunger Vital Sign     Worried About Running Out of Food in the Last Year: Patient unable to answer     Ran Out of Food in the Last Year: Patient unable to answer   Transportation Needs: Patient Unable To Answer (4/6/2025)    PRAPARE - Transportation     Lack of Transportation (Medical): Patient unable to answer     Lack of Transportation (Non-Medical): Patient unable to answer   Physical Activity: Insufficiently Active (2/17/2025)    Exercise Vital Sign     Days of Exercise per Week: 2 days     Minutes of Exercise per Session: 30 min   Stress: Patient Unable To Answer (4/7/2025)    Citizen of [place] of [employer] - Occupational Stress Questionnaire     Feeling of Stress : Patient unable to answer   Housing Stability: Patient Unable To Answer (4/7/2025)    Housing Stability Vital Sign     Unable to Pay for Housing in the Last Year: Patient unable to answer     Homeless in the Last Year: Patient unable to answer     Review of Systems   Constitutional:  Positive for chills. Negative for fever.   HENT:  Negative for sore throat.    Respiratory:  Negative for cough and shortness of breath.    Cardiovascular:  Negative for chest pain.   Gastrointestinal:  Positive for nausea. Negative for abdominal pain, constipation, diarrhea and vomiting.   Genitourinary:  Negative for dysuria and hematuria.   Musculoskeletal:  Positive for arthralgias. Negative for myalgias.   Neurological:  Negative for weakness and headaches.     Objective:     Vital Signs (Most Recent):  Temp: 97.4 °F (36.3 °C) (04/21/25 1415)  Pulse: 75 (04/21/25 1400)  Resp: 18 (04/21/25 1132)  BP: 122/77 (04/21/25 1132)  SpO2: (!) 94 % (04/21/25 1132) Vital Signs (24h Range):  Temp:  [94.4 °F (34.7  °C)-97.9 °F (36.6 °C)] 97.4 °F (36.3 °C)  Pulse:  [] 75  Resp:  [17-20] 18  SpO2:  [92 %-95 %] 94 %  BP: (112-128)/(55-77) 122/77     Weight: 73.4 kg (161 lb 13.1 oz)  Body mass index is 27.78 kg/m².    Estimated Creatinine Clearance: 22.8 mL/min (A) (based on SCr of 1.9 mg/dL (H)).     Physical Exam  Vitals reviewed.   Constitutional:       General: She is not in acute distress.     Appearance: Normal appearance. She is not ill-appearing.   HENT:      Head: Normocephalic and atraumatic.   Eyes:      Extraocular Movements: Extraocular movements intact.      Conjunctiva/sclera: Conjunctivae normal.   Cardiovascular:      Rate and Rhythm: Normal rate and regular rhythm.      Heart sounds: No murmur heard.  Pulmonary:      Effort: Pulmonary effort is normal. No respiratory distress.      Breath sounds: Normal breath sounds. No wheezing.   Abdominal:      General: Abdomen is flat. Bowel sounds are normal.      Palpations: Abdomen is soft.      Tenderness: There is no abdominal tenderness.   Skin:     General: Skin is warm and dry.   Neurological:      General: No focal deficit present.      Mental Status: She is alert and oriented to person, place, and time.   Psychiatric:         Mood and Affect: Mood normal.         Behavior: Behavior normal.         Thought Content: Thought content normal.          Significant Labs: All pertinent labs within the past 24 hours have been reviewed.  Recent Lab Results  (Last 5 results in the past 24 hours)        04/21/25  1451   04/21/25  0751   04/21/25  0735   04/21/25  0520   04/20/25  1739        Albumin       2.3         ALP       104         ALT       20         Anion Gap       10         AST       37         Baso #       0.12         Basophil %       0.8         BILIRUBIN TOTAL       0.6  Comment: For infants and newborns, interpretation of results should be based   on gestational age, weight and in agreement with clinical   observations.    Premature Infant recommended  reference ranges:   0-24 hours:  <8.0 mg/dL   24-48 hours: <12.0 mg/dL   3-5 days:    <15.0 mg/dL   6-29 days:   <15.0 mg/dL         BLOOD CULTURE         No Growth After 6 Hours  [P]       BSA 1.85               BUN       45         Calcium       10.0         Chloride       115         CO2       25         Creatinine       1.9         Left Ventricle Relative Wall Thickness 0.22               Echo EF Estimated 53               eGFR       26  Comment: Estimated GFR calculated using the CKD-EPI creatinine (2021) equation.         Eos #       0.14         Eos %       1.0         FS 26.1               Glucose       78         Gran # (ANC)       11.67         Hematocrit       39.9         Hemoglobin       12.0         Immature Grans (Abs)       0.09  Comment: Mild elevation in immature granulocytes is non specific and can be seen in a variety of conditions including stress response, acute inflammation, trauma and pregnancy. Correlation with other laboratory and clinical findings is essential.         Immature Granulocytes       0.6         IVC diameter 2.28               IVSd 0.9               Lactic Acid Level   1.6             LV EDV BP 98               LV Diastolic Volume Index 54.75               LVIDd 4.6               LVIDs 3.4               LV mass 99.3               LV Mass Index 55.5               LV ESV BP 46               LV Systolic Volume Index 25.7               Lymph #       1.06         Lymph %       7.3         Magnesium        2.2         MCH       27.9         MCHC       30.1         MCV       93         Mean e' 0.07               Mono #       1.52         Mono %       10.4         MPV       10.1         Neut %       79.9         nRBC       0         QRS Duration     152           OHS QTC Calculation     508           Platelet Count       183         Potassium       4.3         PROTEIN TOTAL       5.7         PW 0.5               Est. RA pres 15               RBC       4.30         RDW       16.3          RV S' 9.62               RV TB RVSP 18               RV/LV Ratio 1.11               RV- razo basal diam 5.1               Sodium       150         TAPSE 2.08               TDI SEPTAL 0.03               TDI LATERAL 0.10               TR Max Norberto 3.0               Troponin I High Sensitivity       61         TV PG 35               TV resting pulmonary artery pressure 51               Uric Acid       9.9         Vancomycin, Random       23.8         WBC       14.60         ZLVIDD -0.73               ZLVIDS 0.84                                       [P] - Preliminary Result               Significant Imaging: I have reviewed all pertinent imaging results/findings within the past 24 hours.

## 2025-04-21 NOTE — ASSESSMENT & PLAN NOTE
79F with multiple medical comorbidities and prolonged hospitalization with atraumatic onset left knee pain x 2 weeks in absence of effusion, erythema, TTP or limited ROM. Very unlikely to be septic arthritis. Will defer aspiration at this time given low suspicion. Her UA and urine culture support UTI; consider as potential source for sepsis.     With regards to her left knee, lidocaine patches have provided her good patient relief. Recommend continuing the lidocaine patches and multimodal pain regimen minimizing narcotic use.     Defer rest of care to primary team.    Please reach out to orthopedic surgery with any further questions or concerns.

## 2025-04-21 NOTE — PROGRESS NOTES
04/20/25 2000   RASS (Manjarrez Agitation-Sedation Scale)   RASS (Manjarrez Agitation-Sedation Scale) -1   Pain/Comfort/Sleep Interventions   Sleep/Rest Enhancement consistent schedule promoted;regular sleep/rest pattern promoted   Cognitive   Cognitive/Neuro/Behavioral WDL ex;arousability;mood/behavior   Level of Consciousness (AVPU) responds to voice   Arousal Level arouses to voice;arouses to touch/gentle shaking   Orientation disoriented to;situation;time   Speech follows commands   Mood/Behavior withdrawn;flat affect

## 2025-04-21 NOTE — HPI
"Ms. Barajas is a 79F with PMH of HFpEF, COPD on 2L, aflutter s/p ablation, CKD3, HTN, HLD, chronic venous insufficiency, admitted for complete heart block requiring temporary pacing, also requiring intubation and pressors, has since been downgraded from ICU. Infectious disease consulted for "Recurrent sepsis with hypothermia, etiology unclear, possible PNA, possible UTUI, ongoing hypothermia requiring jeffrey hugger despite over 24 hrs on Zosyn and Vanc. Repeating BCx, broadening to Vanc/Sher/Dinah for now".     She reports some nausea and L knee pain, no other symptoms. Admission BCX negative, and 4/18 and 4/20 NG. 4/19 UCX with GNR and E faecium. Has nonocclusive thrombi of L IJ and cephalic veins. CXR with pulmonary congestion. L knee XR with small suprapatellar bursa effusion.   "

## 2025-04-21 NOTE — CONSULTS
[facility]  Orthopedics  Consult Note    Attg Note:  I agree with the resident's assessment and plan.  Benign exam.  No need for aspiration at this time.    Eric Quezada MD      Patient Name: Misa Barajas  MRN: [id number]  Admission Date: 4/6/2025  Hospital Length of Stay: 15 days  Attending Provider: Ilene Wu MD  Primary Care Provider: Isela Langston MD    Patient information was obtained from patient, relative(s), and past medical records.     Inpatient consult to Orthopedics  Consult performed by: Lisa Cali MD  Consult ordered by: Ilene Wu MD        Subjective:     Principal Problem:Complete heart block    Chief Complaint:   Chief Complaint   Patient presents with    Bradycardia     Arrives via EMS hypotensive 80s/50s and bradycadic in the 20s.        HPI: Misa Barajas is a  79 y.o. female with multiple medical problems and prolonged hospital admission for whom orthopedic surgery is consulted to rule out left knee septic arthritis as the source of her current sepsis. Patient is a poor historian but her daughter is bedside and is able to provide history. She reports generalized, intermittent left knee pain x 2 weeks. No inciting injury or traumatic event. No swelling or erythema. She has been using lidocaine patches intermittently which help. The patient denies prior hx of falls. Patient denies numbness and tingling. Denies any other musculoskeletal pain or injuries. No known history of prior left knee injury or surgery.         Past Medical History:   Diagnosis Date    Acute biliary pancreatitis 07/27/2024    Acute pancreatitis 07/27/2024    Anemia 07/12/2024    Aortic atherosclerosis     Arthritis     knee joint pain    Asthma-COPD overlap syndrome 08/22/2022    home o2    Breast cancer 2002    left breast & lymph nodes-s/p sx with chemo    Cataracts, bilateral     Chronic diastolic heart failure 12/24/2019    Chronic kidney disease, stage 3     Class 1  obesity due to excess calories with serious comorbidity and body mass index (BMI) of 30.0 to 30.9 in adult 05/16/2024    Gram-negative bacteremia - Pasturella multocida 07/12/2024    History of chemotherapy     last treatment 12/2002 (had 8 treatments)    Hyperlipidemia 11/20/2016    Hypertension     Lymphedema of both lower extremities 01/25/2022    Nephrolithiasis 06/02/2014    Osteoporosis     Paroxysmal atrial fibrillation 06/07/2021    Renal osteodystrophy 01/14/2016    Sepsis 4/19/2025    Shock 04/06/2025    Subclinical hypothyroidism 04/06/2025    Venous stasis dermatitis of both lower extremities 01/25/2022    Vitamin D insufficiency        Past Surgical History:   Procedure Laterality Date    ABLATION, ATRIAL FLUTTER, TYPICAL N/A 6/17/2024    Procedure: Ablation, Atrial Flutter, Typical;  Surgeon: Taz Church MD;  Location: [facility] EP LAB;  Service: Cardiology;  Laterality: N/A;  AFL, RFA, LORENE, MAKAYLA (cx if SR), anes, MB, 3 Prep    BREAST BIOPSY Left 2002    core bx, +    BREAST BIOPSY Right 2018    core    BREAST BIOPSY Right 2019    BREAST LUMPECTOMY Left 2002    CYSTOSCOPY  4/28/2022    Procedure: CYSTOSCOPY;  Surgeon: Vik Ware MD;  Location: [facility] OR 1ST FLR;  Service: Urology;;    CYSTOSCOPY  5/30/2022    Procedure: CYSTOSCOPY;  Surgeon: Bonnie Knutson MD;  Location: [facility] OR 1ST FLR;  Service: Urology;;    ECHOCARDIOGRAM,TRANSESOPHAGEAL N/A 6/17/2024    Procedure: Transesophageal echo (MAKAYLA) intra-procedure log documentation;  Surgeon: Nestor Martinez MD;  Location: [facility] EP LAB;  Service: Cardiology;  Laterality: N/A;    ERCP N/A 7/30/2024    Procedure: ERCP (ENDOSCOPIC RETROGRADE CHOLANGIOPANCREATOGRAPHY);  Surgeon: Sierra Cotto MD;  Location: [facility] ENDO (2ND FLR);  Service: Endoscopy;  Laterality: N/A;    IMPLANTATION OF LEADLESS PACEMAKER N/A 4/10/2025    Procedure: INSERTION, CARDIAC PACEMAKER, LEADLESS;  Surgeon: CAROLINE Solis MD;  Location: [facility] EP LAB;  Service:  Cardiology;  Laterality: N/A;  CHB, MICRA leadless PPM, MDT, Anes, EH, CICU 3073    LASER LITHOTRIPSY  5/30/2022    Procedure: LITHOTRIPSY, USING LASER;  Surgeon: Bonnie Knutson MD;  Location: [facility] OR 1ST FLR;  Service: Urology;;    LITHOTRIPSY      MASTECTOMY Left 06/2002    left-& lymph node dissection    REMOVAL, PACEMAKER, TEMPORARY TRANSVENOUS  4/10/2025    Procedure: Removal, Pacemaker, Temporary Transvenous;  Surgeon: CAROLINE Solis MD;  Location: [facility] EP LAB;  Service: Cardiology;;    REPLACEMENT OF STENT Right 5/30/2022    Procedure: REPLACEMENT, STENT;  Surgeon: Bonnie Knutson MD;  Location: [facility] OR 1ST FLR;  Service: Urology;  Laterality: Right;    RETROGRADE PYELOGRAPHY Right 4/28/2022    Procedure: PYELOGRAM, RETROGRADE;  Surgeon: Vik Ware MD;  [facility];  Service: Urology;  Laterality: Right;    ROBOT-ASSISTED LAPAROSCOPIC PARTIAL NEPHRECTOMY USING DA JESÚS XI Right 3/11/2021    Procedure: XI ROBOTIC NEPHRECTOMY, PARTIAL;  Surgeon: Taz Ortega MD;  Location: [facility] OR 2ND FLR;  Service: Urology;  Laterality: Right;  4hr/ gen with regional  [facility] confirmation:  [id number] for 11:15am case NC    URETEROSCOPIC REMOVAL OF URETERIC CALCULUS Right 5/30/2022    Procedure: REMOVAL, CALCULUS, URETER, URETEROSCOPIC;  Surgeon: Bonnie Knutson MD;  [facility];  Service: Urology;  Laterality: Right;    ureteroscopy Bilateral     6.14    URETEROSCOPY Right 5/30/2022    Procedure: URETEROSCOPY;  Surgeon: Bonnie Knutson MD;  [facility];  Service: Urology;  Laterality: Right;       Review of patient's allergies indicates:   Allergen Reactions    Adhesive tape-silicones     Adhesive Rash     Pt states she removed a LATEX bandaid and the skin beneath was swollen and red. No other latex causes a reaction.       Current Facility-Administered Medications   Medication    acetaminophen tablet 650 mg    albuterol-ipratropium 2.5  mg-0.5 mg/3 mL nebulizer solution 3 mL    apixaban tablet 10 mg    Followed by    [START ON 4/27/2025] apixaban tablet 5 mg    D5W infusion    dextrose 50% injection 12.5 g    dextrose 50% injection 25 g    glucagon (human recombinant) injection 1 mg    glucose chewable tablet 16 g    glucose chewable tablet 24 g    hydrOXYzine HCL tablet 25 mg    levothyroxine tablet 50 mcg    LIDOcaine 5 % patch 1 patch    meropenem (MERREM) 1 g in 0.9% NaCl 100 mL IVPB (MB+)    metoprolol tartrate (LOPRESSOR) split tablet 12.5 mg    micafungin 100 mg in 0.9% NaCl 100 mL IVPB (MB+)    nitroGLYCERIN SL tablet 0.4 mg    ondansetron disintegrating tablet 8 mg    polyethylene glycol packet 17 g    senna tablet 8.6 mg    simethicone chewable tablet 80 mg    sodium chloride 0.9% flush 10 mL    vancomycin - pharmacy to dose     Family History       Problem Relation (Age of Onset)    Arthritis Mother, Sister    Bone cancer Mother    Breast cancer Mother    Cancer Father    Crohn's disease Daughter    Fibroids Daughter    No Known Problems Brother, Daughter          Tobacco Use    Smoking status: Former     Current packs/day: 0.00     Average packs/day: 1 pack/day for 50.0 years (50.0 ttl pk-yrs)     Types: Cigarettes     Start date: 1960     Quit date: 5/20/2009     Years since quitting: 15.9    Smokeless tobacco: Former   Substance and Sexual Activity    Alcohol use: No    Drug use: No    Sexual activity: Not Currently     Partners: Male     Birth control/protection: Partner-Vasectomy     ROS  Constitutional: negative for fevers, +chills  Eyes: negative visual changes or eye discharge  ENT: negative for ear pain or sore throat  Respiratory: negative for shortness of breath or cough  Cardiovascular: negative for chest pain or palpitations  Gastrointestinal: negative for abdominal pain, nausea, or vomiting  Genitourinary: negative for dysuria and flank pain  Neurological: negative for headaches or dizziness  Musculoskeletal: left knee pain  "    Objective:     Vital Signs (Most Recent):  Temp: 97.8 °F (36.6 °C) (04/21/25 1132)  Pulse: 75 (04/21/25 1400)  Resp: 18 (04/21/25 1132)  BP: 122/77 (04/21/25 1132)  SpO2: (!) 94 % (04/21/25 1132) Vital Signs (24h Range):  Temp:  [94.4 °F (34.7 °C)-97.9 °F (36.6 °C)] 97.8 °F (36.6 °C)  Pulse:  [] 75  Resp:  [17-20] 18  SpO2:  [91 %-95 %] 94 %  BP: (112-128)/(55-77) 122/77     Weight: 73.4 kg (161 lb 13.1 oz)  Height: 5' 4" (162.6 cm)  Body mass index is 27.78 kg/m².      Intake/Output Summary (Last 24 hours) at 4/21/2025 1415  Last data filed at 4/21/2025 0406  Gross per 24 hour   Intake 60 ml   Output 500 ml   Net -440 ml        Ortho/SPM Exam     General Exam  General appearance: Alert. NAD.   HEENT: Normocephalic, head atraumatic.. External appearance of nose, mouth, ears wnl.  Neck: Supple. Trachea midline.  Respiratory: Breathing unlabored on O2 NC.   CV: Extremities warm and well perfused.  Neurologic: No focal motor or sensory deficits.   Psychatric: Appropriate mood and affect.  Skin: Warm, dry, well perfused. No rash.      Ortho exam  Left upper extremity  Inspection: no swelling, lacerations, abrasions, contusions, or deformity  Palpation: no TTP, no palpable abnormality, compartments soft and compressible  ROM: full, painless passive and active ROM of shoulder, elbow, wrist, and fingers  Neuro: intact flexion/extension of shoulder, elbow, wrist. Strong hand . Able to give thumbs up, make "OK" sign, cross IF/LF, abduct/adduct fingers, make fist. SILT axillary, radial, median, ulnar.  Vascular: 2+ radial pulse, fingers WWP     Right upper extremity  Inspection: no swelling, lacerations, abrasions, contusions, or deformity  Palpation: no TTP, no palpable abnormality, compartments soft and compressible  ROM: full, painless passive and active Ricketts f shoulder, elbow, wrist, and fingers  Neuro: intact flexion/extension of shoulder, elbow, wrist. Strong hand . Able to give thumbs up, make "OK" " sign, cross IF/LF, abduct/adduct fingers, make fist. SILT axillary, radial, median, ulnar.  Vascular: 2+ radial pulse, fingers WWP     Left lower extremity  Inspection: no swelling, redness, lacerations, abrasions, contusions, or deformity. Lidocaine patch in place left knee   Palpation: no TTP, no palpable abnormality, compartments soft and compressible  ROM: full, painless passive and active ROM of hip, knee, ankle, toes  Neuro: intact flexion/extension of hip, knee, ankle, hallux. SILT throughout.   Vascular: 2+ DP pulse, toes WWP     Right lower extremity  Inspection: no swelling, lacerations, abrasions, contusions, or deformity  Palpation: no TTP, no palpable abnormality, compartments soft and compressible  ROM: full, painless passive and active ROM of hip, knee, ankle, toes  Neuro: intact flexion/extension of hip, knee, ankle, hallux. SILT throughout.   Vascular: 2+ DP pulse, toes WWP     Significant Labs: All pertinent labs within the past 24 hours have been reviewed.    Recent Labs   Lab 04/21/25  0520   WBC 14.60*   RBC 4.30   HGB 12.0   HCT 39.9      MCV 93   MCH 27.9   MCHC 30.1*     CRP 8.5  Uric acid 9.9   Urine +nitrites, leuk esterase 2+  Urine cultures >100,000cfu/ml GNR, 50k-99,999 cfu/ml enterococcus faecium        Significant Imaging: xrays independently reviewed and interpreted  Left knee severe degenerative joint disease (KL4). No fracture or dislocation. Small suprapatellar effusion.  Assessment/Plan:     Left knee pain  79F with multiple medical comorbidities and prolonged hospitalization with atraumatic onset left knee pain x 2 weeks in absence of effusion, erythema, TTP or limited ROM. Very unlikely to be septic arthritis. Will defer aspiration at this time given low suspicion. Her UA and urine culture support UTI; consider as potential source for sepsis.     With regards to her left knee, lidocaine patches have provided her good patient relief. Recommend continuing the lidocaine  patches and multimodal pain regimen minimizing narcotic use. Weight bear as tolerated, range of motion as tolerated. PT/OT.    Defer rest of care to primary team.    Please reach out to orthopedic surgery with any further questions or concerns.           Lisa Cali MD  Orthopedics  [facility]

## 2025-04-21 NOTE — NURSING
2100- patient arousable to voice, answered question, states daughters name is Iqra. Informed she was going to Ultrasound, ensured O2 was in place, O2 tank with transport. Tele box for ongoing cardiac monitoring in place. Patient slightly nervous noted lips shivering, reassured.

## 2025-04-21 NOTE — PROGRESS NOTES
Pharmacokinetic Assessment Follow Up: IV Vancomycin    Vancomycin serum concentration assessment(s):    Vanc R = 23.8 mcg/mL. MARQUEZ, SCR 1.9    Vancomycin Regimen Plan:  Hold vancomycin today  Obtain concentration in AM 4/22  Goal trough = 15-20 mcg/mL    Thank you for the consult, will continue to follow    Baltazar Mnuiz Pharm.D., BCPS  [id number]         Patient brief summary:  Misa Barajas is a 79 y.o. female initiated on antimicrobial therapy with IV Vancomycin for treatment of sepsis      Drug levels (last 3 results):  Recent Labs   Lab Result Units 04/20/25 0412 04/21/25  0520   Vancomycin Random ug/ml 16.8 23.8       Drug Allergies:   Review of patient's allergies indicates:   Allergen Reactions    Adhesive tape-silicones     Adhesive Rash     Pt states she removed a LATEX bandaid and the skin beneath was swollen and red. No other latex causes a reaction.       Actual Body Weight:   73 kg    Renal Function:   Estimated Creatinine Clearance: 22.8 mL/min (A) (based on SCr of 1.9 mg/dL (H)).,       CBC (last 72 hours):  Recent Labs   Lab Result Units 04/18/25 2005 04/19/25  0823 04/20/25 0411 04/21/25  0520   WBC K/uL 8.46 7.83 10.94 14.60*   HGB gm/dL 12.0 11.7* 11.5* 12.0   HCT % 37.8 37.4 37.2 39.9   Platelet Count K/uL 174 177 175 183   Lymph % % 11.1*  --   --  7.3*   Mono % % 11.0  --   --  10.4   Eos % % 2.1  --   --  1.0   Basophil % % 0.8  --   --  0.8       Metabolic Panel (last 72 hours):  Recent Labs   Lab Result Units 04/18/25  1521 04/19/25  0121 04/19/25  0823 04/19/25  1015 04/20/25  0412 04/20/25  1304 04/21/25  0520   Sodium mmol/L 152*  --  149*  --  145 149* 150*   Urine Sodium mmol/L  --  78  --   --   --   --   --    Potassium mmol/L 4.0  --  4.1  --  3.9 4.0 4.3   Chloride mmol/L 117*  --  118*  --  113* 115* 115*   CO2 mmol/L 25  --  24  --  24 25 25   Glucose mg/dL 109  --  93  --  84 110 78   Glucose, UA   --  Negative  --  Negative  --   --   --    BUN mg/dL 43*  --  46*  --  44* 42* 45*    Creatinine mg/dL 1.0  --  1.2  --  1.5* 1.6* 1.9*   Urine Creatinine mg/dL  --  49.0  --   --   --   --   --    Albumin g/dL  --   --  2.4*  --  2.3*  2.2* 2.1* 2.3*   Bilirubin Total mg/dL  --   --   --   --  0.5 0.5 0.6   ALP unit/L  --   --   --   --  93 90 104   AST unit/L  --   --   --   --  37 35 37   ALT unit/L  --   --   --   --  20 19 20   Magnesium  mg/dL  --   --  1.9  --  2.1  --  2.2   Phosphorus Level mg/dL  --   --  2.9  --  3.1  --   --        Vancomycin Administrations:  vancomycin given in the last 96 hours                     vancomycin 1,500 mg in 0.9% NaCl 250 mL IVPB (admixture device) (mg) 1,500 mg New Bag 04/19/25 1106                    Microbiologic Results:  Microbiology Results (last 7 days)       Procedure Component Value Units Date/Time    Blood culture [[id number]]  (Normal) Collected: 04/20/25 1736    Order Status: Completed Specimen: Blood Updated: 04/21/25 0301     Blood Culture No Growth After 6 Hours    Blood culture [[id number]]  (Normal) Collected: 04/20/25 1739    Order Status: Completed Specimen: Blood Updated: 04/21/25 0301     Blood Culture No Growth After 6 Hours    Urine culture [[id number]] Collected: 04/19/25 1015    Order Status: Completed Specimen: Urine Updated: 04/21/25 0016     Urine Culture Multiple organisms isolated. None in predominance. Repeat if clinically necessary.    Urine culture [[id number]]  (Abnormal) Collected: 04/19/25 0121    Order Status: Completed Specimen: Urine Updated: 04/21/25 0008     Urine Culture >100,000 cfu/ml Gram-negative Rods     Comment: Identification and susceptibility pending         50,000 - 99,999 cfu/ml Enterococcus faecium     Comment: Susceptibility pending       Blood culture [[id number]]  (Normal) Collected: 04/18/25 2005    Order Status: Completed Specimen: Blood from Peripheral, Antecubital, Right Updated: 04/21/25 0001     Blood Culture No Growth After 48 Hours    Blood culture [[id number]]  (Normal) Collected: 04/18/25  2006    Order Status: Completed Specimen: Blood from Peripheral, Wrist, Right Updated: 04/21/25 0001     Blood Culture No Growth After 48 Hours    Culture, Respiratory with Gram Stain [[id number]]     Order Status: Sent Specimen: Respiratory

## 2025-04-21 NOTE — SUBJECTIVE & OBJECTIVE
Past Medical History:   Diagnosis Date    Acute biliary pancreatitis 07/27/2024    Acute pancreatitis 07/27/2024    Anemia 07/12/2024    Aortic atherosclerosis     Arthritis     knee joint pain    Asthma-COPD overlap syndrome 08/22/2022    home o2    Breast cancer 2002    left breast & lymph nodes-s/p sx with chemo    Cataracts, bilateral     Chronic diastolic heart failure 12/24/2019    Chronic kidney disease, stage 3     Class 1 obesity due to excess calories with serious comorbidity and body mass index (BMI) of 30.0 to 30.9 in adult 05/16/2024    Gram-negative bacteremia - Pasturella multocida 07/12/2024    History of chemotherapy     last treatment 12/2002 (had 8 treatments)    Hyperlipidemia 11/20/2016    Hypertension     Lymphedema of both lower extremities 01/25/2022    Nephrolithiasis 06/02/2014    Osteoporosis     Paroxysmal atrial fibrillation 06/07/2021    Renal osteodystrophy 01/14/2016    Sepsis 4/19/2025    Shock 04/06/2025    Subclinical hypothyroidism 04/06/2025    Venous stasis dermatitis of both lower extremities 01/25/2022    Vitamin D insufficiency        Past Surgical History:   Procedure Laterality Date    ABLATION, ATRIAL FLUTTER, TYPICAL N/A 6/17/2024    Procedure: Ablation, Atrial Flutter, Typical;  Surgeon: Taz Church MD;  Location: [facility] EP LAB;  Service: Cardiology;  Laterality: N/A;  AFL, RFA, LORENE, MAKAYLA (cx if SR), anes, MB, 3 Prep    BREAST BIOPSY Left 2002    core bx, +    BREAST BIOPSY Right 2018    core    BREAST BIOPSY Right 2019    BREAST LUMPECTOMY Left 2002    CYSTOSCOPY  4/28/2022    Procedure: CYSTOSCOPY;  Surgeon: Vik Ware MD;  Location: [facility] OR [address] [place] [address];  Service: Urology;;    CYSTOSCOPY  5/30/2022    Procedure: CYSTOSCOPY;  Surgeon: Bonnie Knutson MD;  Location: [facility] OR [address] [place] [address];  Service: Urology;;    ECHOCARDIOGRAM,TRANSESOPHAGEAL N/A 6/17/2024    Procedure: Transesophageal echo (MAKAYLA) intra-procedure log documentation;  Surgeon: Nestor Martinez,  MD;  Location: [facility] EP LAB;  Service: Cardiology;  Laterality: N/A;    ERCP N/A 7/30/2024    Procedure: ERCP (ENDOSCOPIC RETROGRADE CHOLANGIOPANCREATOGRAPHY);  Surgeon: Sierra Cotto MD;  Location: [facility] ENDO (2ND FLR);  Service: Endoscopy;  Laterality: N/A;    IMPLANTATION OF LEADLESS PACEMAKER N/A 4/10/2025    Procedure: INSERTION, CARDIAC PACEMAKER, LEADLESS;  Surgeon: CAROLINE Solis MD;  Location: [facility] EP LAB;  Service: Cardiology;  Laterality: N/A;  CHB, MICRA leadless PPM, MDT, Anes, EH, CICU 3073    LASER LITHOTRIPSY  5/30/2022    Procedure: LITHOTRIPSY, USING LASER;  Surgeon: Bonnie Knutson MD;  Location: [facility] OR 1ST FLR;  Service: Urology;;    LITHOTRIPSY      MASTECTOMY Left 06/2002    left-& lymph node dissection    REMOVAL, PACEMAKER, TEMPORARY TRANSVENOUS  4/10/2025    Procedure: Removal, Pacemaker, Temporary Transvenous;  Surgeon: CAROLINE Solis MD;  Location: [facility] EP LAB;  Service: Cardiology;;    REPLACEMENT OF STENT Right 5/30/2022    Procedure: REPLACEMENT, STENT;  Surgeon: Bonnie Knutson MD;  Location: [facility] OR 1ST FLR;  Service: Urology;  Laterality: Right;    RETROGRADE PYELOGRAPHY Right 4/28/2022    Procedure: PYELOGRAM, RETROGRADE;  Surgeon: Vik Ware MD;  Location: [facility] OR 1ST FLR;  Service: Urology;  Laterality: Right;    ROBOT-ASSISTED LAPAROSCOPIC PARTIAL NEPHRECTOMY USING DA JESÚS XI Right 3/11/2021    Procedure: XI ROBOTIC NEPHRECTOMY, PARTIAL;  Surgeon: Taz Ortega MD;  Location: [facility] OR 2ND FLR;  Service: Urology;  Laterality: Right;  4hr/ gen with regional  [facility] confirmation:  [id number] for 11:15am case NC    URETEROSCOPIC REMOVAL OF URETERIC CALCULUS Right 5/30/2022    Procedure: REMOVAL, CALCULUS, URETER, URETEROSCOPIC;  Surgeon: Bonnie Knutson MD;  Location: [facility] OR 1ST FLR;  Service: Urology;  Laterality: Right;    ureteroscopy Bilateral     6.14    URETEROSCOPY Right 5/30/2022    Procedure: URETEROSCOPY;  Surgeon: Bonnie RECINOS  MD Eamon;  Location: [facility] OR [address] [place] [address];  Service: Urology;  Laterality: Right;       Review of patient's allergies indicates:   Allergen Reactions    Adhesive tape-silicones     Adhesive Rash     Pt states she removed a LATEX bandaid and the skin beneath was swollen and red. No other latex causes a reaction.       Current Facility-Administered Medications   Medication    acetaminophen tablet 650 mg    albuterol-ipratropium 2.5 mg-0.5 mg/3 mL nebulizer solution 3 mL    apixaban tablet 10 mg    Followed by    [START ON 4/27/2025] apixaban tablet 5 mg    D5W infusion    dextrose 50% injection 12.5 g    dextrose 50% injection 25 g    glucagon (human recombinant) injection 1 mg    glucose chewable tablet 16 g    glucose chewable tablet 24 g    hydrOXYzine HCL tablet 25 mg    levothyroxine tablet 50 mcg    LIDOcaine 5 % patch 1 patch    meropenem (MERREM) 1 g in 0.9% NaCl 100 mL IVPB (MB+)    metoprolol tartrate (LOPRESSOR) split tablet 12.5 mg    micafungin 100 mg in 0.9% NaCl 100 mL IVPB (MB+)    nitroGLYCERIN SL tablet 0.4 mg    ondansetron disintegrating tablet 8 mg    polyethylene glycol packet 17 g    senna tablet 8.6 mg    simethicone chewable tablet 80 mg    sodium chloride 0.9% flush 10 mL    vancomycin - pharmacy to dose     Family History       Problem Relation (Age of Onset)    Arthritis Mother, Sister    Bone cancer Mother    Breast cancer Mother    Cancer Father    Crohn's disease Daughter    Fibroids Daughter    No Known Problems Brother, Daughter          Tobacco Use    Smoking status: Former     Current packs/day: 0.00     Average packs/day: 1 pack/day for 50.0 years (50.0 ttl pk-yrs)     Types: Cigarettes     Start date: 1960     Quit date: 5/20/2009     Years since quitting: 15.9    Smokeless tobacco: Former   Substance and Sexual Activity    Alcohol use: No    Drug use: No    Sexual activity: Not Currently     Partners: Male     Birth control/protection: Partner-Vasectomy     ROS  Constitutional:  "negative for fevers, +chills  Eyes: negative visual changes or eye discharge  ENT: negative for ear pain or sore throat  Respiratory: negative for shortness of breath or cough  Cardiovascular: negative for chest pain or palpitations  Gastrointestinal: negative for abdominal pain, nausea, or vomiting  Genitourinary: negative for dysuria and flank pain  Neurological: negative for headaches or dizziness  Musculoskeletal: left knee pain     Objective:     Vital Signs (Most Recent):  Temp: 97.8 °F (36.6 °C) (04/21/25 1132)  Pulse: 75 (04/21/25 1400)  Resp: 18 (04/21/25 1132)  BP: 122/77 (04/21/25 1132)  SpO2: (!) 94 % (04/21/25 1132) Vital Signs (24h Range):  Temp:  [94.4 °F (34.7 °C)-97.9 °F (36.6 °C)] 97.8 °F (36.6 °C)  Pulse:  [] 75  Resp:  [17-20] 18  SpO2:  [91 %-95 %] 94 %  BP: (112-128)/(55-77) 122/77     Weight: 73.4 kg (161 lb 13.1 oz)  Height: 5' 4" (162.6 cm)  Body mass index is 27.78 kg/m².      Intake/Output Summary (Last 24 hours) at 4/21/2025 1415  Last data filed at 4/21/2025 0406  Gross per 24 hour   Intake 60 ml   Output 500 ml   Net -440 ml        Ortho/SPM Exam     General Exam  General appearance: Alert. NAD.   HEENT: Normocephalic, head atraumatic.. External appearance of nose, mouth, ears wnl.  Neck: Supple. Trachea midline.  Respiratory: Breathing unlabored on O2 NC.   CV: Extremities warm and well perfused.  Neurologic: No focal motor or sensory deficits.   Psychatric: Appropriate mood and affect.  Skin: Warm, dry, well perfused. No rash.      Ortho exam  Left upper extremity  Inspection: no swelling, lacerations, abrasions, contusions, or deformity  Palpation: no TTP, no palpable abnormality, compartments soft and compressible  ROM: full, painless passive and active ROM of shoulder, elbow, wrist, and fingers  Neuro: intact flexion/extension of shoulder, elbow, wrist. Strong hand . Able to give thumbs up, make "OK" sign, cross IF/LF, abduct/adduct fingers, make fist. SILT axillary, " "radial, median, ulnar.  Vascular: 2+ radial pulse, fingers WWP     Right upper extremity  Inspection: no swelling, lacerations, abrasions, contusions, or deformity  Palpation: no TTP, no palpable abnormality, compartments soft and compressible  ROM: full, painless passive and active Ricketts f shoulder, elbow, wrist, and fingers  Neuro: intact flexion/extension of shoulder, elbow, wrist. Strong hand . Able to give thumbs up, make "OK" sign, cross IF/LF, abduct/adduct fingers, make fist. SILT axillary, radial, median, ulnar.  Vascular: 2+ radial pulse, fingers WWP     Left lower extremity  Inspection: no swelling, redness, lacerations, abrasions, contusions, or deformity. Lidocaine patch in place left knee   Palpation: no TTP, no palpable abnormality, compartments soft and compressible  ROM: full, painless passive and active ROM of hip, knee, ankle, toes  Neuro: intact flexion/extension of hip, knee, ankle, hallux. SILT throughout.   Vascular: 2+ DP pulse, toes WWP     Right lower extremity  Inspection: no swelling, lacerations, abrasions, contusions, or deformity  Palpation: no TTP, no palpable abnormality, compartments soft and compressible  ROM: full, painless passive and active ROM of hip, knee, ankle, toes  Neuro: intact flexion/extension of hip, knee, ankle, hallux. SILT throughout.   Vascular: 2+ DP pulse, toes WWP     Significant Labs: All pertinent labs within the past 24 hours have been reviewed.    Recent Labs   Lab 04/21/25  0520   WBC 14.60*   RBC 4.30   HGB 12.0   HCT 39.9      MCV 93   MCH 27.9   MCHC 30.1*     CRP 8.5  Uric acid 9.9   Urine +nitrites, leuk esterase 2+  Urine cultures >100,000cfu/ml GNR, 50k-99,999 cfu/ml enterococcus faecium        Significant Imaging: xrays independently reviewed and interpreted  Left knee severe degenerative joint disease (KL4). No fracture or dislocation. Small suprapatellar effusion.  "

## 2025-04-21 NOTE — SUBJECTIVE & OBJECTIVE
Past Medical History:   Diagnosis Date    Acute biliary pancreatitis 07/27/2024    Acute pancreatitis 07/27/2024    Anemia 07/12/2024    Aortic atherosclerosis     Arthritis     knee joint pain    Asthma-COPD overlap syndrome 08/22/2022    home o2    Breast cancer 2002    left breast & lymph nodes-s/p sx with chemo    Cataracts, bilateral     Chronic diastolic heart failure 12/24/2019    Chronic kidney disease, stage 3     Class 1 obesity due to excess calories with serious comorbidity and body mass index (BMI) of 30.0 to 30.9 in adult 05/16/2024    Gram-negative bacteremia - Pasturella multocida 07/12/2024    History of chemotherapy     last treatment 12/2002 (had 8 treatments)    Hyperlipidemia 11/20/2016    Hypertension     Lymphedema of both lower extremities 01/25/2022    Nephrolithiasis 06/02/2014    Osteoporosis     Paroxysmal atrial fibrillation 06/07/2021    Renal osteodystrophy 01/14/2016    Sepsis 4/19/2025    Shock 04/06/2025    Subclinical hypothyroidism 04/06/2025    Venous stasis dermatitis of both lower extremities 01/25/2022    Vitamin D insufficiency        Past Surgical History:   Procedure Laterality Date    ABLATION, ATRIAL FLUTTER, TYPICAL N/A 6/17/2024    Procedure: Ablation, Atrial Flutter, Typical;  Surgeon: Taz Church MD;  Location: [facility] EP LAB;  Service: Cardiology;  Laterality: N/A;  AFL, RFA, LORENE, MAKAYLA (cx if SR), anes, MB, 3 Prep    BREAST BIOPSY Left 2002    core bx, +    BREAST BIOPSY Right 2018    core    BREAST BIOPSY Right 2019    BREAST LUMPECTOMY Left 2002    CYSTOSCOPY  4/28/2022    Procedure: CYSTOSCOPY;  Surgeon: Vik Ware MD;  Location: [facility] OR [address] [place] [address];  Service: Urology;;    CYSTOSCOPY  5/30/2022    Procedure: CYSTOSCOPY;  Surgeon: Bonnie Knutson MD;  Location: [facility] OR [address] [place] [address];  Service: Urology;;    ECHOCARDIOGRAM,TRANSESOPHAGEAL N/A 6/17/2024    Procedure: Transesophageal echo (MAKAYLA) intra-procedure log documentation;  Surgeon: Nestor Martinez,  MD;  Location: [facility] EP LAB;  Service: Cardiology;  Laterality: N/A;    ERCP N/A 7/30/2024    Procedure: ERCP (ENDOSCOPIC RETROGRADE CHOLANGIOPANCREATOGRAPHY);  Surgeon: Sierra Cotto MD;  Location: [facility] ENDO (2ND FLR);  Service: Endoscopy;  Laterality: N/A;    IMPLANTATION OF LEADLESS PACEMAKER N/A 4/10/2025    Procedure: INSERTION, CARDIAC PACEMAKER, LEADLESS;  Surgeon: CAROLINE Solis MD;  Location: [facility] EP LAB;  Service: Cardiology;  Laterality: N/A;  CHB, MICRA leadless PPM, MDT, Anes, EH, CICU 3073    LASER LITHOTRIPSY  5/30/2022    Procedure: LITHOTRIPSY, USING LASER;  Surgeon: Bonnie Knutson MD;  Location: [facility] OR 1ST FLR;  Service: Urology;;    LITHOTRIPSY      MASTECTOMY Left 06/2002    left-& lymph node dissection    REMOVAL, PACEMAKER, TEMPORARY TRANSVENOUS  4/10/2025    Procedure: Removal, Pacemaker, Temporary Transvenous;  Surgeon: CAROLINE Solis MD;  Location: [facility] EP LAB;  Service: Cardiology;;    REPLACEMENT OF STENT Right 5/30/2022    Procedure: REPLACEMENT, STENT;  Surgeon: Bonnie Knutson MD;  Location: [facility] OR 1ST FLR;  Service: Urology;  Laterality: Right;    RETROGRADE PYELOGRAPHY Right 4/28/2022    Procedure: PYELOGRAM, RETROGRADE;  Surgeon: Vik Ware MD;  Location: [facility] OR 1ST FLR;  Service: Urology;  Laterality: Right;    ROBOT-ASSISTED LAPAROSCOPIC PARTIAL NEPHRECTOMY USING DA JESÚS XI Right 3/11/2021    Procedure: XI ROBOTIC NEPHRECTOMY, PARTIAL;  Surgeon: Taz Ortega MD;  Location: [facility] OR 2ND FLR;  Service: Urology;  Laterality: Right;  4hr/ gen with regional  [facility] confirmation:  [id number] for 11:15am case NC    URETEROSCOPIC REMOVAL OF URETERIC CALCULUS Right 5/30/2022    Procedure: REMOVAL, CALCULUS, URETER, URETEROSCOPIC;  Surgeon: Bonnie Knutson MD;  Location: [facility] OR 1ST FLR;  Service: Urology;  Laterality: Right;    ureteroscopy Bilateral     6.14    URETEROSCOPY Right 5/30/2022    Procedure: URETEROSCOPY;  Surgeon: Bonnie RECINOS  "MD Eamon;  Location: [facility] OR [address] [place] [address];  Service: Urology;  Laterality: Right;       Review of patient's allergies indicates:   Allergen Reactions    Adhesive tape-silicones     Adhesive Rash     Pt states she removed a LATEX bandaid and the skin beneath was swollen and red. No other latex causes a reaction.       Medications:  Medications Prior to Admission   Medication Sig    budesonide-glycopyr-formoterol (BREZTRI AEROSPHERE) 160-9-4.8 mcg/actuation HFAA Inhale 2 puffs into the lungs 2 (two) times daily.    bumetanide (BUMEX) 1 MG tablet Take 1 tablet (1 mg total) by mouth once daily. Okay to take bumex 1mg twice daily as needed for worsening shortness of breath, swelling or 3lb weight gain    ferrous sulfate (FEOSOL) 325 mg (65 mg iron) Tab tablet Take 1 tablet (325 mg total) by mouth every other day.    meclizine (ANTIVERT) 12.5 mg tablet Take 1 tablet (12.5 mg total) by mouth Daily.    metoprolol succinate (TOPROL-XL) 25 MG 24 hr tablet Take ½ tablet (12.5 mg total) by mouth once daily.    midodrine (PROAMATINE) 2.5 MG Tab Take 1 tablet (2.5 mg total) by mouth 3 (three) times daily.    potassium chloride (MICRO-K) 10 MEQ CpSR Take 2 capsules (20 mEq total) by mouth once daily.     Antibiotics (From admission, onward)      Start     Stop Route Frequency Ordered    04/20/25 1745  meropenem (MERREM) 1 g in 0.9% NaCl 100 mL IVPB (MB+)         -- IV Every 12 hours (non-standard times) 04/20/25 1636    04/19/25 0941  vancomycin - pharmacy to dose  (vancomycin IVPB (PEDS and ADULTS))        Placed in "And" Linked Group    -- IV pharmacy to manage frequency 04/19/25 0842          Antifungals (From admission, onward)      None          Antivirals (From admission, onward)      None             Immunization History   Administered Date(s) Administered    COVID-19, MRNA, LN-S, PF (Pfizer) (Purple Cap) 01/13/2021, 02/04/2021, 12/10/2021    COVID-19, mRNA, LNP-S, bivalent booster, PF (PFIZER OMICRON) 09/18/2024    " Influenza 10/05/2009, 10/12/2011, 09/10/2014, 09/12/2019    Influenza (FLUAD) - Quadrivalent - Adjuvanted - PF *Preferred* (65+) 10/08/2020, 10/20/2022    Influenza - Quadrivalent - High Dose - PF (65 years and older) 10/22/2021, 11/08/2023    Influenza - Quadrivalent - PF *Preferred* (6 months and older) 10/05/2009, 10/12/2011, 09/10/2014    Influenza - Trivalent - Afluria, Fluzone MDV 10/05/2009, 10/12/2011, 09/10/2014    Influenza - Trivalent - Fluad - Adjuvanted - PF (65 years and older 09/12/2019    Influenza - Trivalent - Fluzone High Dose - PF (65 years and older) 09/23/2015, 09/14/2016, 09/05/2017, 09/12/2018, 10/08/2020    Influenza Split 09/10/2014    PPD Test 08/08/2024    Pneumococcal Conjugate - 13 Valent 01/14/2016    Pneumococcal Polysaccharide - 23 Valent 09/30/2014    Tdap 08/08/2016       Family History       Problem Relation (Age of Onset)    Arthritis Mother, Sister    Bone cancer Mother    Breast cancer Mother    Cancer Father    Crohn's disease Daughter    Fibroids Daughter    No Known Problems Brother, Daughter          Social History     Socioeconomic History    Marital status:    Tobacco Use    Smoking status: Former     Current packs/day: 0.00     Average packs/day: 1 pack/day for 50.0 years (50.0 ttl pk-yrs)     Types: Cigarettes     Start date: 1960     Quit date: 5/20/2009     Years since quitting: 15.9    Smokeless tobacco: Former   Substance and Sexual Activity    Alcohol use: No    Drug use: No    Sexual activity: Not Currently     Partners: Male     Birth control/protection: Partner-Vasectomy     Social Drivers of Health     Financial Resource Strain: Patient Unable To Answer (4/7/2025)    Overall Financial Resource Strain (CARDIA)     Difficulty of Paying Living Expenses: Patient unable to answer   Food Insecurity: Patient Unable To Answer (4/7/2025)    Hunger Vital Sign     Worried About Running Out of Food in the Last Year: Patient unable to answer     Ran Out of Food in  the Last Year: Patient unable to answer   Transportation Needs: Patient Unable To Answer (4/6/2025)    PRAPARE - Transportation     Lack of Transportation (Medical): Patient unable to answer     Lack of Transportation (Non-Medical): Patient unable to answer   Physical Activity: Insufficiently Active (2/17/2025)    Exercise Vital Sign     Days of Exercise per Week: 2 days     Minutes of Exercise per Session: 30 min   Stress: Patient Unable To Answer (4/7/2025)    [employer] - Occupational Stress Questionnaire     Feeling of Stress : Patient unable to answer   Housing Stability: Patient Unable To Answer (4/7/2025)    Housing Stability Vital Sign     Unable to Pay for Housing in the Last Year: Patient unable to answer     Homeless in the Last Year: Patient unable to answer     Review of Systems   Constitutional:  Positive for chills. Negative for fever.   HENT:  Negative for sore throat.    Respiratory:  Negative for cough and shortness of breath.    Cardiovascular:  Negative for chest pain.   Gastrointestinal:  Positive for nausea. Negative for abdominal pain, constipation, diarrhea and vomiting.   Genitourinary:  Negative for dysuria and hematuria.   Musculoskeletal:  Positive for arthralgias. Negative for myalgias.   Neurological:  Negative for weakness and headaches.     Objective:     Vital Signs (Most Recent):  Temp: 97.4 °F (36.3 °C) (04/21/25 1415)  Pulse: 75 (04/21/25 1400)  Resp: 18 (04/21/25 1132)  BP: 122/77 (04/21/25 1132)  SpO2: (!) 94 % (04/21/25 1132) Vital Signs (24h Range):  Temp:  [94.4 °F (34.7 °C)-97.9 °F (36.6 °C)] 97.4 °F (36.3 °C)  Pulse:  [] 75  Resp:  [17-20] 18  SpO2:  [92 %-95 %] 94 %  BP: (112-128)/(55-77) 122/77     Weight: 73.4 kg (161 lb 13.1 oz)  Body mass index is 27.78 kg/m².    Estimated Creatinine Clearance: 22.8 mL/min (A) (based on SCr of 1.9 mg/dL (H)).     Physical Exam  Vitals reviewed.   Constitutional:       General: She is not in acute  distress.     Appearance: Normal appearance. She is not ill-appearing.   HENT:      Head: Normocephalic and atraumatic.   Eyes:      Extraocular Movements: Extraocular movements intact.      Conjunctiva/sclera: Conjunctivae normal.   Cardiovascular:      Rate and Rhythm: Normal rate and regular rhythm.      Heart sounds: No murmur heard.  Pulmonary:      Effort: Pulmonary effort is normal. No respiratory distress.      Breath sounds: Normal breath sounds. No wheezing.   Abdominal:      General: Abdomen is flat. Bowel sounds are normal.      Palpations: Abdomen is soft.      Tenderness: There is no abdominal tenderness.   Skin:     General: Skin is warm and dry.   Neurological:      General: No focal deficit present.      Mental Status: She is alert and oriented to person, place, and time.   Psychiatric:         Mood and Affect: Mood normal.         Behavior: Behavior normal.         Thought Content: Thought content normal.          Significant Labs: All pertinent labs within the past 24 hours have been reviewed.  Recent Lab Results  (Last 5 results in the past 24 hours)        04/21/25  1451   04/21/25  0751   04/21/25  0735   04/21/25  0520   04/20/25  1739        Albumin       2.3         ALP       104         ALT       20         Anion Gap       10         AST       37         Baso #       0.12         Basophil %       0.8         BILIRUBIN TOTAL       0.6  Comment: For infants and newborns, interpretation of results should be based   on gestational age, weight and in agreement with clinical   observations.    Premature Infant recommended reference ranges:   0-24 hours:  <8.0 mg/dL   24-48 hours: <12.0 mg/dL   3-5 days:    <15.0 mg/dL   6-29 days:   <15.0 mg/dL         BLOOD CULTURE         No Growth After 6 Hours  [P]       BSA 1.85               BUN       45         Calcium       10.0         Chloride       115         CO2       25         Creatinine       1.9         Left Ventricle Relative Wall Thickness  0.22               Echo EF Estimated 53               eGFR       26  Comment: Estimated GFR calculated using the CKD-EPI creatinine (2021) equation.         Eos #       0.14         Eos %       1.0         FS 26.1               Glucose       78         Gran # (ANC)       11.67         Hematocrit       39.9         Hemoglobin       12.0         Immature Grans (Abs)       0.09  Comment: Mild elevation in immature granulocytes is non specific and can be seen in a variety of conditions including stress response, acute inflammation, trauma and pregnancy. Correlation with other laboratory and clinical findings is essential.         Immature Granulocytes       0.6         IVC diameter 2.28               IVSd 0.9               Lactic Acid Level   1.6             LV EDV BP 98               LV Diastolic Volume Index 54.75               LVIDd 4.6               LVIDs 3.4               LV mass 99.3               LV Mass Index 55.5               LV ESV BP 46               LV Systolic Volume Index 25.7               Lymph #       1.06         Lymph %       7.3         Magnesium        2.2         MCH       27.9         MCHC       30.1         MCV       93         Mean e' 0.07               Mono #       1.52         Mono %       10.4         MPV       10.1         Neut %       79.9         nRBC       0         QRS Duration     152           OHS QTC Calculation     508           Platelet Count       183         Potassium       4.3         PROTEIN TOTAL       5.7         PW 0.5               Est. RA pres 15               RBC       4.30         RDW       16.3         RV S' 9.62               RV TB RVSP 18               RV/LV Ratio 1.11               RV- razo basal diam 5.1               Sodium       150         TAPSE 2.08               TDI SEPTAL 0.03               TDI LATERAL 0.10               TR Max Norberto 3.0               Troponin I High Sensitivity       61         TV PG 35               TV resting pulmonary artery pressure 51                Uric Acid       9.9         Vancomycin, Random       23.8         WBC       14.60         ZLVIDD -0.73               ZLVIDS 0.84                                       [P] - Preliminary Result               Significant Imaging: I have reviewed all pertinent imaging results/findings within the past 24 hours.

## 2025-04-21 NOTE — PROGRESS NOTES
[facility] Stepdown  Orthopedics  Progress Note    Patient Name: Misa Barajas  MRN: [id number]  Admission Date: 4/6/2025  Hospital Length of Stay: 15 days  Attending Provider: Ilene Wu MD  Primary Care Provider: Isela Langston MD  Follow-up For: Procedure(s) (LRB):  INSERTION, CARDIAC PACEMAKER, LEADLESS (N/A)  Removal, Pacemaker, Temporary Transvenous    Post-Operative Day: 11 Days Post-Op  Subjective:     Past Medical History:   Diagnosis Date    Acute biliary pancreatitis 07/27/2024    Acute pancreatitis 07/27/2024    Anemia 07/12/2024    Aortic atherosclerosis     Arthritis     knee joint pain    Asthma-COPD overlap syndrome 08/22/2022    home o2    Breast cancer 2002    left breast & lymph nodes-s/p sx with chemo    Cataracts, bilateral     Chronic diastolic heart failure 12/24/2019    Chronic kidney disease, stage 3     Class 1 obesity due to excess calories with serious comorbidity and body mass index (BMI) of 30.0 to 30.9 in adult 05/16/2024    Gram-negative bacteremia - Pasturella multocida 07/12/2024    History of chemotherapy     last treatment 12/2002 (had 8 treatments)    Hyperlipidemia 11/20/2016    Hypertension     Lymphedema of both lower extremities 01/25/2022    Nephrolithiasis 06/02/2014    Osteoporosis     Paroxysmal atrial fibrillation 06/07/2021    Renal osteodystrophy 01/14/2016    Sepsis 4/19/2025    Shock 04/06/2025    Subclinical hypothyroidism 04/06/2025    Venous stasis dermatitis of both lower extremities 01/25/2022    Vitamin D insufficiency        Past Surgical History:   Procedure Laterality Date    ABLATION, ATRIAL FLUTTER, TYPICAL N/A 6/17/2024    Procedure: Ablation, Atrial Flutter, Typical;  Surgeon: Taz Church MD;  Location: [facility] LAB;  Service: Cardiology;  Laterality: N/A;  AFL, RFA, LORENE, MAKAYLA (cx if SR), LEANNE miller, 3 Prep    BREAST BIOPSY Left 2002    core bx, +    BREAST BIOPSY Right 2018    core    BREAST BIOPSY Right 2019    BREAST  LUMPECTOMY Left 2002    CYSTOSCOPY  4/28/2022    Procedure: CYSTOSCOPY;  Surgeon: Vik Ware MD;  [facility];  Service: Urology;;    CYSTOSCOPY  5/30/2022    Procedure: CYSTOSCOPY;  Surgeon: Bonnie Knutson MD;  [facility];  Service: Urology;;    ECHOCARDIOGRAM,TRANSESOPHAGEAL N/A 6/17/2024    Procedure: Transesophageal echo (MAKAYLA) intra-procedure log documentation;  Surgeon: Nestor Martinez MD;  Location: [facility] EP LAB;  Service: Cardiology;  Laterality: N/A;    ERCP N/A 7/30/2024    Procedure: ERCP (ENDOSCOPIC RETROGRADE CHOLANGIOPANCREATOGRAPHY);  Surgeon: Sierra Cotto MD;  Location: [facility] ENDO (2ND FLR);  Service: Endoscopy;  Laterality: N/A;    IMPLANTATION OF LEADLESS PACEMAKER N/A 4/10/2025    Procedure: INSERTION, CARDIAC PACEMAKER, LEADLESS;  Surgeon: CAROLINE Solis MD;  Location: [facility] EP LAB;  Service: Cardiology;  Laterality: N/A;  CHB, MICRA leadless PPM, MDT, Anes, EH, CICU 3073    LASER LITHOTRIPSY  5/30/2022    Procedure: LITHOTRIPSY, USING LASER;  Surgeon: Bonnie Knutson MD;  Location: [facility] OR [address] [place] [address];  Service: Urology;;    LITHOTRIPSY      MASTECTOMY Left 06/2002    left-& lymph node dissection    REMOVAL, PACEMAKER, TEMPORARY TRANSVENOUS  4/10/2025    Procedure: Removal, Pacemaker, Temporary Transvenous;  Surgeon: CAROLINE Solis MD;  Location: [facility] EP LAB;  Service: Cardiology;;    REPLACEMENT OF STENT Right 5/30/2022    Procedure: REPLACEMENT, STENT;  Surgeon: Bonnie Knutson MD;  [facility];  Service: Urology;  Laterality: Right;    RETROGRADE PYELOGRAPHY Right 4/28/2022    Procedure: PYELOGRAM, RETROGRADE;  Surgeon: Vik Ware MD;  [facility];  Service: Urology;  Laterality: Right;    ROBOT-ASSISTED LAPAROSCOPIC PARTIAL NEPHRECTOMY USING DA JESÚS XI Right 3/11/2021    Procedure: XI ROBOTIC NEPHRECTOMY, PARTIAL;  Surgeon: Taz Ortega MD;  Location: [facility] OR [address] [place] [address];  Service:  Urology;  Laterality: Right;  4hr/ gen with regional  [facility] confirmation:  [id number] for 11:15am case NC    URETEROSCOPIC REMOVAL OF URETERIC CALCULUS Right 5/30/2022    Procedure: REMOVAL, CALCULUS, URETER, URETEROSCOPIC;  Surgeon: Bonnie Knutson MD;  Location: [facility] OR [address] [place] [address];  Service: Urology;  Laterality: Right;    ureteroscopy Bilateral     6.14    URETEROSCOPY Right 5/30/2022    Procedure: URETEROSCOPY;  Surgeon: Bonnie Knutson MD;  Location: [facility] OR [address] [place] [address];  Service: Urology;  Laterality: Right;       Review of patient's allergies indicates:   Allergen Reactions    Adhesive tape-silicones     Adhesive Rash     Pt states she removed a LATEX bandaid and the skin beneath was swollen and red. No other latex causes a reaction.       Current Facility-Administered Medications   Medication    acetaminophen tablet 650 mg    albuterol-ipratropium 2.5 mg-0.5 mg/3 mL nebulizer solution 3 mL    apixaban tablet 10 mg    Followed by    [START ON 4/27/2025] apixaban tablet 5 mg    D5W infusion    dextrose 50% injection 12.5 g    dextrose 50% injection 25 g    glucagon (human recombinant) injection 1 mg    glucose chewable tablet 16 g    glucose chewable tablet 24 g    hydrOXYzine HCL tablet 25 mg    levothyroxine tablet 50 mcg    LIDOcaine 5 % patch 1 patch    meropenem (MERREM) 1 g in 0.9% NaCl 100 mL IVPB (MB+)    metoprolol tartrate (LOPRESSOR) split tablet 12.5 mg    micafungin 100 mg in 0.9% NaCl 100 mL IVPB (MB+)    nitroGLYCERIN SL tablet 0.4 mg    ondansetron disintegrating tablet 8 mg    polyethylene glycol packet 17 g    senna tablet 8.6 mg    simethicone chewable tablet 80 mg    sodium chloride 0.9% flush 10 mL    vancomycin - pharmacy to dose     Family History       Problem Relation (Age of Onset)    Arthritis Mother, Sister    Bone cancer Mother    Breast cancer Mother    Cancer Father    Crohn's disease Daughter    Fibroids Daughter    No Known Problems Brother, Daughter          Tobacco Use  "   Smoking status: Former     Current packs/day: 0.00     Average packs/day: 1 pack/day for 50.0 years (50.0 ttl pk-yrs)     Types: Cigarettes     Start date: 1960     Quit date: 5/20/2009     Years since quitting: 15.9    Smokeless tobacco: Former   Substance and Sexual Activity    Alcohol use: No    Drug use: No    Sexual activity: Not Currently     Partners: Male     Birth control/protection: Partner-Vasectomy     ROS  Constitutional: negative for fevers, +chills  Eyes: negative visual changes or eye discharge  ENT: negative for ear pain or sore throat  Respiratory: negative for shortness of breath or cough  Cardiovascular: negative for chest pain or palpitations  Gastrointestinal: negative for abdominal pain, nausea, or vomiting  Genitourinary: negative for dysuria and flank pain  Neurological: negative for headaches or dizziness  Musculoskeletal: left knee pain     Objective:     Vital Signs (Most Recent):  Temp: 97.8 °F (36.6 °C) (04/21/25 1132)  Pulse: 75 (04/21/25 1400)  Resp: 18 (04/21/25 1132)  BP: 122/77 (04/21/25 1132)  SpO2: (!) 94 % (04/21/25 1132) Vital Signs (24h Range):  Temp:  [94.4 °F (34.7 °C)-97.9 °F (36.6 °C)] 97.8 °F (36.6 °C)  Pulse:  [] 75  Resp:  [17-20] 18  SpO2:  [91 %-95 %] 94 %  BP: (112-128)/(55-77) 122/77     Weight: 73.4 kg (161 lb 13.1 oz)  Height: 5' 4" (162.6 cm)  Body mass index is 27.78 kg/m².      Intake/Output Summary (Last 24 hours) at 4/21/2025 1415  Last data filed at 4/21/2025 0406  Gross per 24 hour   Intake 60 ml   Output 500 ml   Net -440 ml        Ortho/SPM Exam     General Exam  General appearance: Alert. NAD.   HEENT: Normocephalic, head atraumatic.. External appearance of nose, mouth, ears wnl.  Neck: Supple. Trachea midline.  Respiratory: Breathing unlabored on O2 NC.   CV: Extremities warm and well perfused.  Neurologic: No focal motor or sensory deficits.   Psychatric: Appropriate mood and affect.  Skin: Warm, dry, well perfused. No rash.      Ortho " "exam  Left upper extremity  Inspection: no swelling, lacerations, abrasions, contusions, or deformity  Palpation: no TTP, no palpable abnormality, compartments soft and compressible  ROM: full, painless passive and active ROM of shoulder, elbow, wrist, and fingers  Neuro: intact flexion/extension of shoulder, elbow, wrist. Strong hand . Able to give thumbs up, make "OK" sign, cross IF/LF, abduct/adduct fingers, make fist. SILT axillary, radial, median, ulnar.  Vascular: 2+ radial pulse, fingers WWP     Right upper extremity  Inspection: no swelling, lacerations, abrasions, contusions, or deformity  Palpation: no TTP, no palpable abnormality, compartments soft and compressible  ROM: full, painless passive and active Ricketts f shoulder, elbow, wrist, and fingers  Neuro: intact flexion/extension of shoulder, elbow, wrist. Strong hand . Able to give thumbs up, make "OK" sign, cross IF/LF, abduct/adduct fingers, make fist. SILT axillary, radial, median, ulnar.  Vascular: 2+ radial pulse, fingers WWP     Left lower extremity  Inspection: no swelling, redness, lacerations, abrasions, contusions, or deformity. Lidocaine patch in place left knee   Palpation: no TTP, no palpable abnormality, compartments soft and compressible  ROM: full, painless passive and active ROM of hip, knee, ankle, toes  Neuro: intact flexion/extension of hip, knee, ankle, hallux. SILT throughout.   Vascular: 2+ DP pulse, toes WWP     Right lower extremity  Inspection: no swelling, lacerations, abrasions, contusions, or deformity  Palpation: no TTP, no palpable abnormality, compartments soft and compressible  ROM: full, painless passive and active ROM of hip, knee, ankle, toes  Neuro: intact flexion/extension of hip, knee, ankle, hallux. SILT throughout.   Vascular: 2+ DP pulse, toes WWP     Significant Labs: All pertinent labs within the past 24 hours have been reviewed.    Recent Labs   Lab 04/21/25  0520   WBC 14.60*   RBC 4.30   HGB 12.0 "   HCT 39.9      MCV 93   MCH 27.9   MCHC 30.1*     CRP 8.5  Uric acid 9.9   Urine +nitrites, leuk esterase 2+  Urine cultures >100,000cfu/ml GNR, 50k-99,999 cfu/ml enterococcus faecium        Significant Imaging: xrays independently reviewed and interpreted  Left knee severe degenerative joint disease (KL4). No fracture or dislocation. Small suprapatellar effusion.  Assessment/Plan:     Left knee pain  79F with multiple medical comorbidities and prolonged hospitalization with atraumatic onset left knee pain x 2 weeks in absence of effusion, erythema, TTP or limited ROM. Very unlikely to be septic arthritis. Will defer aspiration at this time given low suspicion. Her UA and urine culture support UTI; consider as potential source for sepsis.     With regards to her left knee, lidocaine patches have provided her good patient relief. Recommend continuing the lidocaine patches and multimodal pain regimen minimizing narcotic use.     Defer rest of care to primary team.    Please reach out to orthopedic surgery with any further questions or concerns.           Lisa Cali MD  Orthopedics  [facility] - Card[facility] Stepdown     You can access the FollowMyHealth Patient Portal offered by [facility] by registering at the following website: [email or web address]. By joining [employer]’s FollowMyHealth portal, you will also be able to view your health information using other applications (apps) compatible with our system.

## 2025-04-21 NOTE — ASSESSMENT & PLAN NOTE
79F initially admitted for complete heart block requiring temporary pacing, also requiring intubation and pressors, has since been downgraded from ICU, has had hypothermic readings for past couple of days, so abx broadened to vanc/richi/lillie. She reports some nausea and L knee pain, no other symptoms. Admission BCX negative, and 4/18 and 4/20 NG. 4/19 UCX with GNR and E faecium (no symptoms). Has nonocclusive thrombi of L IJ and cephalic veins. CXR with pulmonary congestion. L knee XR with small suprapatellar bursa effusion.    Recommendations  Stop micafungin, no convincing evidence of fungal infection and is low-risk for fungemia  Continue vancomycin and meropenem for now  Follow up BCX and UCX

## 2025-04-21 NOTE — PROGRESS NOTES
"   04/20/25 2008   Vital Signs   Temp 96.4 °F (35.8 °C)  (bear hugger on)   Temp Source Oral   Pulse 62   Resp 18   SpO2 95 %   Pulse Oximetry Type Intermittent   Probe Placed On (Pulse Ox) Right:;finger   Flow (L/min) (Oxygen Therapy) 4   Device (Oxygen Therapy) nasal cannula with humidification   BP (!) 112/59   BP Location Right arm   BP Method Automatic   Patient Position Lying        Cardiac/Telemetry Details / Alarms   Cardiac/Telemetry Monitor On Yes   Assessments (Pre/Post)   Level of Consciousness (AVPU) responds to voice     Patient lying comfortably in bed, arouses to voice. Aox 3, drowsy. Bear hugger on for temperature regulation. Vitals as above. Needs extensive encouragement for PO intake and meds. Ultrasound of extremities pending. Ongoing cardiac monitoring in place. Bed alarm on, no family at bedside at this time. Bed locked and in lowest position, call light within reach , door open, lights adjusted. This RN sitting near room charting when not providing care for other patients. Safety measures in place, meds reviewed and plan of care with patient, drowsy but nods and states "ok" .  "

## 2025-04-21 NOTE — HPI
Misa Barajas is a  79 y.o. female with numerous medical problems and prolonged hospital admission for whom orthopedic surgery is consulted to rule out left knee septic arthritis as the source of her current sepsis. Patient is a poor historian. She reports generalized, intermittent left knee pain x 2 weeks. No inciting injury or traumatic event. No swelling or erythema. She has been using lidocaine patches intermittently which help. The patient denies prior hx of falls. Patient denies numbness and tingling. Denies any other musculoskeletal pain or injuries. No known history of prior left knee injury or surgery.

## 2025-04-21 NOTE — PLAN OF CARE
[facility] Stepdown  Discharge Reassessment    Primary Care Provider: Isela Langston MD    Expected Discharge Date: 4/23/2025    Reassessment (most recent)       Discharge Reassessment - 04/21/25 1528          Discharge Reassessment    Assessment Type Discharge Planning Reassessment     Did the patient's condition or plan change since previous assessment? Yes     Discharge Plan discussed with: Patient;Adult children     Communicated NOHEMI with patient/caregiver Date not available/Unable to determine     Discharge Plan A Skilled Nursing Facility     Discharge Plan B Home Health     DME Needed Upon Discharge  none     Transition of Care Barriers None     Why the patient remains in the hospital Requires continued medical care        Post-Acute Status    Post-Acute Authorization Placement     Post-Acute Placement Status Pending medical clearance/testing                   Per Dr Wu pt is not longer medically stable for discharge 2/2 infection.  MARINE relayed this to Roberto in admissions at [facility], who reported they had everything ready to admit including auth.  MARINE met with pt and daughter Esther at bedside who voiced no additional questions or concerns at this time.  Discharge Plan A and Plan B have been determined by review of patient's clinical status, future medical and therapeutic needs, and coverage/benefits for post-acute care in coordination with multidisciplinary team members.  Will continue to follow.      Karey Castelan LMSW  [facility]  [id number]

## 2025-04-21 NOTE — PT/OT/SLP PROGRESS
Speech Language Pathology Treatment  Discharge    Patient Name:  Misa Barajas   MRN:  [id number]  Admitting Diagnosis: Complete heart block    Recommendations:                 General Recommendations:  Follow-up not indicated  Diet recommendations:  Regular Diet - IDDSI Level 7, Liquid Diet Level: Thin liquids - IDDSI Level 0   Aspiration Precautions: Small bites/sips   General Precautions: Standard, aspiration, fall  Communication strategies:  none    Assessment:     Misa Barajas is a 79 y.o. female with an SLP diagnosis of Dysphagia.      Subjective     Awake/alert    Pain/Comfort:  Pain Rating 1: 0/10  Pain Rating Post-Intervention 1: 0/10    Respiratory Status: Room air    Objective:     Has the patient been evaluated by SLP for swallowing?   Yes  Keep patient NPO? No     Pt repositioned upright in bed for PO trials. Pt still has poor appetite with limited PO intake, but denies difficulty with PO. She tolerated cereal with milk x3, boost x6 consecutive sips via straw and pills whole x3. SLP educated pt on results from ENT scope, need for outpatient follow up, swallow precautions and diet recommendations. No further ST warranted at this time. Continue regular diet/thin liquids at this time. No further ST warranted.     Goals:   Multidisciplinary Problems       SLP Goals          Problem: SLP    Goal Priority Disciplines Outcome   SLP Goal     SLP    Description: Speech Language Pathology Goals  Goals expected to be met by 4/17    1. Pt will participate in ongoing swallow assessment                                Plan:     Patient to be seen:  3 x/week   Plan of Care expires:     Plan of Care reviewed with:  patient   SLP Follow-Up:  No       Discharge recommendations:   (tbd)       Time Tracking:     SLP Treatment Date:   04/21/25  Speech Start Time:  0800  Speech Stop Time:  0815     Speech Total Time (min):  15 min    Billable Minutes: Treatment Swallowing Dysfunction 7 and Self Care/Home Management  Training 8    04/21/2025

## 2025-04-21 NOTE — HOSPITAL COURSE
Admitted to CCU for CHB in s/o hyperkalemia & in the presence of conduction disease/RBBB & LAHB - stable w/ temporary wire.  Septic shock deemed to be d/t unknown source); vasopressors weaned off. HTN; started NG gtt. Acute on chronic hypoxic/hypercapnic RF & obtunded; intubated. Successfully extubated 04/08. MARQUEZ on CKD; improved w/ good UOP response to diuresis. PVCs w/ intermittent pacing by TVP. Switched pip-tazo to amox-clav on 04/09; continued to improve; empiric EED 04/13 for 7 day course. Micra PM by EP placed 04/10. Medically stable for stepdown. Patient was extremely short of breath with physical therapy, chest x-ray concerning for pulmonary edema. Started patient on IV diuresis, now euvolemic. ENT was consulted yesterday due to hoarseness of voice. 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 Pt uninterested in any procedure at this time, ENT recommended SLP follow up. Able to tolerate regular diet at this point. Now agreeable for SNF, working on placement. Course c/b worsening hypoxia & hyperNa.     Three days prior to consult patient's sepsis worsened and started on broad-spectrum antibiotics.  Not improving over the weekend so consulted to rule out left knee septic arthritis.

## 2025-04-21 NOTE — PT/OT/SLP PROGRESS
Occupational Therapy      Patient Name:  Misa Barajas   MRN:  [id number]    Patient not seen today secondary to pt SHASHA for ECHO on 1st attempt. Writing therapist unable to return this day. Will follow-up as appropriate per OT POC.    4/21/2025

## 2025-04-22 PROBLEM — N30.90 CYSTITIS: Status: ACTIVE | Noted: 2025-04-22

## 2025-04-22 NOTE — ASSESSMENT & PLAN NOTE
PRESUMED PE (unable to do imaging at this time as risk > benefit)  CONFIRMED LUE DVT  - Apixaban 10 BID x 7 days then 5 BID  - f/u Echo eval R heart strain

## 2025-04-22 NOTE — PROGRESS NOTES
[facility]iology Stepdown  Infectious Disease  Progress Note    Patient Name: Misa Barajas  MRN: [id number]  Admission Date: 4/6/2025  Length of Stay: 16 days  Attending Physician: Ilene Wu MD  Primary Care Provider: Isela Langston MD    Isolation Status: Contact  Assessment/Plan:      Renal/  Cystitis  Possible cystitis in the setting of hypothermia and positive urine culture. Patient denies urinary tract symptoms however daughters at bedside made known that patient under reports symptoms.   Will plan to treat as in hypothermia.     Other  Hypothermia  I have reviewed hospital notes from   service and other specialty providers. I have also reviewed CBC, CMP/BMP,  cultures and imaging with my interpretation as documented.     Recurrent hypothermia of unclear etiology. Hypothermic once again today and without leukocytosis. Urine culture with ESBL K pneumoniae and E faecium. Patient denies urinary tract symptoms however daughters say patient under reports symptoms.  Continue meropenem.  Stop vancomycin.  Start daptomycin.  CK level in am to monitor for daptomycin induced adverse effects.   Contact isolation precautions.  If hypothermia does not resolved with treatment of urine organisms then favor non infectious work up.  Discussed management plan with the staff from  service.        Anticipated Disposition: per primary    Thank you for your consult. I will follow-up with patient. Please contact us if you have any additional questions.    Melinda Morales MD  Infectious Disease  [facility] - Cardiology Stepdown    50 minutes of total time spent on the encounter, which includes face to face time and non-face to face time preparing to see the patient (eg, review of tests), obtaining and/or reviewing separately obtained history, documenting clinical information in the electronic or other health record, independently interpreting results (not separately reported) and communicating results to the  "patient/family/caregiver, or care coordination (not separately reported).     Subjective:     Principal Problem:Complete heart block    HPI: Ms. Barajas is a 79F with PMH of HFpEF, COPD on 2L, aflutter s/p ablation, CKD3, HTN, HLD, chronic venous insufficiency, admitted for complete heart block requiring temporary pacing, also requiring intubation and pressors, has since been downgraded from ICU. Infectious disease consulted for "Recurrent sepsis with hypothermia, etiology unclear, possible PNA, possible UTUI, ongoing hypothermia requiring jeffrey hugger despite over 24 hrs on Zosyn and Vanc. Repeating BCx, broadening to Vanc/Sher/Dinah for now".     She reports some nausea and L knee pain, no other symptoms. Admission BCX negative, and 4/18 and 4/20 NG. 4/19 UCX with GNR and E faecium. Has nonocclusive thrombi of L IJ and cephalic veins. CXR with pulmonary congestion. L knee XR with small suprapatellar bursa effusion.   Interval History: "OK". Patient with hypothermia of unclear etiology. Urine culture with ESBL Klebsiella and E faecium. Hypothermic today without localizing symptoms.     Review of Systems   Constitutional:  Negative for chills, fatigue, fever and unexpected weight change.   HENT:  Negative for ear pain, facial swelling, hearing loss, mouth sores, nosebleeds, rhinorrhea, sinus pressure, sore throat, tinnitus, trouble swallowing and voice change.    Eyes:  Negative for photophobia, pain, redness and visual disturbance.   Respiratory:  Negative for cough, chest tightness, shortness of breath and wheezing.    Cardiovascular:  Negative for chest pain, palpitations and leg swelling.   Gastrointestinal:  Negative for abdominal pain, blood in stool, constipation, diarrhea, nausea and vomiting.   Endocrine: Negative for cold intolerance, heat intolerance, polydipsia, polyphagia and polyuria.   Genitourinary:  Negative for decreased urine volume, dysuria, flank pain, frequency, hematuria, menstrual problem, " urgency, vaginal bleeding, vaginal discharge and vaginal pain.   Musculoskeletal:  Negative for arthralgias, back pain, joint swelling, myalgias and neck pain.   Skin:  Negative for rash.   Allergic/Immunologic: Negative for environmental allergies, food allergies and immunocompromised state.   Neurological:  Negative for dizziness, seizures, syncope, weakness, light-headedness, numbness and headaches.   Hematological:  Negative for adenopathy. Does not bruise/bleed easily.   Psychiatric/Behavioral:  Negative for confusion, hallucinations, self-injury, sleep disturbance and suicidal ideas. The patient is not nervous/anxious.      Objective:     Vital Signs (Most Recent):  Temp: 97.4 °F (36.3 °C) (04/22/25 1450)  Pulse: (!) 57 (04/22/25 1450)  Resp: 20 (04/22/25 1450)  BP: (!) 117/59 (04/22/25 1450)  SpO2: 95 % (04/22/25 1450) Vital Signs (24h Range):  Temp:  [91.4 °F (33 °C)-97.5 °F (36.4 °C)] 97.4 °F (36.3 °C)  Pulse:  [54-81] 57  Resp:  [16-20] 20  SpO2:  [93 %-96 %] 95 %  BP: (104-144)/(50-65) 117/59     Weight: 68.8 kg (151 lb 10.8 oz)  Body mass index is 26.04 kg/m².    Estimated Creatinine Clearance: 26.3 mL/min (A) (based on SCr of 1.6 mg/dL (H)).     Physical Exam  Vitals and nursing note reviewed.   Constitutional:       Appearance: She is well-developed.   HENT:      Head: Normocephalic and atraumatic.      Right Ear: External ear normal.      Left Ear: External ear normal.   Eyes:      General: No scleral icterus.        Right eye: No discharge.         Left eye: No discharge.      Conjunctiva/sclera: Conjunctivae normal.   Cardiovascular:      Rate and Rhythm: Normal rate and regular rhythm.      Heart sounds: Normal heart sounds. No murmur heard.     No friction rub. No gallop.   Pulmonary:      Effort: Pulmonary effort is normal. No respiratory distress.      Breath sounds: Normal breath sounds. No stridor. No wheezing or rales.   Abdominal:      General: Bowel sounds are normal.   Musculoskeletal:          General: Swelling (upper extremities) present. No tenderness. Normal range of motion.   Skin:     General: Skin is warm and dry.   Neurological:      Mental Status: She is alert and oriented to person, place, and time.          Significant Labs: CBC:   Recent Labs   Lab 04/21/25  0520 04/22/25  0450   WBC 14.60* 8.84   HGB 12.0 11.7*   HCT 39.9 38.6    178     CMP:   Recent Labs   Lab 04/21/25  0520 04/22/25 0450   * 145   K 4.3 3.6   * 112*   CO2 25 28   BUN 45* 46*   CREATININE 1.9* 1.6*   CALCIUM 10.0 9.6   ALBUMIN 2.3* 2.1*   BILITOT 0.6 0.5   ALKPHOS 104 93   AST 37 34   ALT 20 17   ANIONGAP 10 5*     Microbiology Results (last 7 days)       Procedure Component Value Units Date/Time    Urine culture [[id number]]  (Abnormal)  (Susceptibility) Collected: 04/19/25 0121    Order Status: Completed Specimen: Urine Updated: 04/22/25 1148     Urine Culture >100,000 cfu/ml Klebsiella pneumoniae esbl      50,000 - 99,999 cfu/ml Enterococcus faecium     Comment: Susceptibility pending       Blood culture [[id number]]  (Normal) Collected: 04/20/25 1736    Order Status: Completed Specimen: Blood Updated: 04/22/25 0900     Blood Culture No Growth After 36 Hours    Blood culture [[id number]]  (Normal) Collected: 04/20/25 1739    Order Status: Completed Specimen: Blood Updated: 04/22/25 0900     Blood Culture No Growth After 36 Hours    Blood culture [[id number]]  (Normal) Collected: 04/18/25 2005    Order Status: Completed Specimen: Blood from Peripheral, Antecubital, Right Updated: 04/22/25 0006     Blood Culture No Growth After 72 Hours    Blood culture [[id number]]  (Normal) Collected: 04/18/25 2006    Order Status: Completed Specimen: Blood from Peripheral, Wrist, Right Updated: 04/22/25 0006     Blood Culture No Growth After 72 Hours    Culture, Anaerobe [[id number]]     Order Status: Sent Specimen: Joint Fluid from Knee, Left     Aerobic culture [[id number]]     Order Status: Sent Specimen: Bone  from Knee, Left     Gram stain [[id number]]     Order Status: Sent Specimen: Joint Fluid from Knee, Left     AFB Culture & Smear [[id number]]     Order Status: Sent Specimen: Joint Fluid from Knee, Left     Fungus culture [[id number]]     Order Status: Sent Specimen: Joint Fluid, Left Knee     Urine culture [[id number]] Collected: 04/19/25 1015    Order Status: Completed Specimen: Urine Updated: 04/21/25 0016     Urine Culture Multiple organisms isolated. None in predominance. Repeat if clinically necessary.    Culture, Respiratory with Gram Stain [[id number]]     Order Status: Sent Specimen: Respiratory             Significant Imaging: I have reviewed all pertinent imaging results/findings within the past 24 hours.

## 2025-04-22 NOTE — PROGRESS NOTES
[facility] Cardiology [facility] Medicine  Progress Note    Patient Name: Misa Barajas  MRN: [id number]  Patient Class: IP- Inpatient   Admission Date: 4/6/2025  Length of Stay: 15 days  Attending Physician: Ilene Wu MD  Primary Care Provider: Is[facility] Langston MD    Principal Problem:Complete heart block    HPI:  Ms. Misa Barajas is a 80 yo female with COPD on 2L home O2, HFpEF, AFL s/p RFA (6/17/24 w/ Dr. Church), CKD III, HTN, HLD, chronic venous insufficiency, orthostatic hypotension who was brought via EMS after per history she was found confused and bradycardiac with HR in the 20s.They started epi and transcutaneous pacing. Given Versed for the transcutaneous pacing. They had to bag her in route. We do not have strip of underlying rhythm. Patient obtunded for which she was intubated for airway protection on arrival to the ED and transcutaneous pacing was exchanged to transvenous pacing via RIJ (threshold 0.6). Underlying rate 44 bifascicular block. Known RBBB. Per daughters patient had called them over the phone when they noticed she appeared confused. Due to concerns for a possible syncopal episode EMS was called. Prior to event patient had been complaining of malaise and nausea for which she took about two Zofran pills per the daughters.      She had been recently seen on 3/27/25 at the [facility] visit following recent admission for AHRF 2/2 ADHF and COPD exacerbation. On ED presentation BNP elevated to 2500, troponin elevated to 19, CXR with pulmonary edema. She was started on steroids, nebs and antibiotics in addition to IV diuresis. Updated TTE stable from prior, showing HFpEF with elevated CVP 15. Her O2 requirements improved to baseline with diuresis and she was subsequently discharged. At that visit patient had not required midodrine as blood pressure had remained stable and she was asymptomatic.      Off note, patient was taken off OAC due to bleeding issues and no recurrent of  "typical atrial flutter after ablation.     Overview/Hospital Course:  Admitted to CCU for CHB in s/o hyperkalemia & in the presence of conduction disease/RBBB & LAHB - stable w/ temporary wire. ??Septic shock deemed to be d/t UTI - ?? UA with only 7 WBCs); vasopressors weaned off. HTN; started NG gtt. Acute on chronic hypoxic/hypercapnic RF & obtunded; intubated. Successfully extubated 04/08. MARQUEZ on CKD; improved w/ good UOP response to diuresis. PVCs w/ intermittent pacing by TVP. Switched pip-tazo to amox-clav on 04/09; continued to improve; empiric EED 04/13 for 7 day course. Micra PM by EP placed 04/10. Medically stable for stepdown. Patient was extremely short of breath with physical therapy, chest x-ray concerning for pulmonary edema.  Started patient on IV diuresis, now euvolemic.  ENT was consulted yesterday due to hoarseness of voice.  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 Pt uninterested in any procedure at this time, ENT recommended SLP follow up.  Able to tolerate regular diet at this point.  Now agreeable for SNF, working on placement. Course c/b worsening hypoxia & hyperNa.      Overnight 4/18-4/19, decompensated with recurrent sepsis, hypothermia 92, possible PNA with infiltrates on CXR, Zosyn IV started. Remained hypothermic despite Zosyn x 2 doses and Manny hugger in place (pt uncomfortable/hot and asking for removal), so added Vanc IV STAT. Checked TSH and FT4-  wnl. UA with pyuria however not a clean catch w 33 sq epi, so repeated UA. C/o "stomach" pain, overall picture with the "stomach" pain and septic picture is "exactly what led us to ICU 2 weeks ago" per daughter. +TTP RUQ, RLQ, LLQ.   Poor UOP, only 200cc by mid-shift, and still hyperNa with FWD 1.8L so started gentle hydration with D5W @ 75cc for 24 hrs. May need to diurese again after sepsis resolves. CT C/A/P noncon with pulm edema, B pleural effusions, possible PNA, no intra-abdominal " process seen.     On 4/21, Mental status much improved, however still with mild confusion and feels very tired.   decompensated further with recurrent MARQUEZ, C/o new SOB and hypoxemic 90% on 4L (over her baseline of 2L at home), improved to >94% on 6L with improvement of SOB.  C/o new L chest pressure/pain 4/10 (non-radiating), c/f MI or PE, resolved after nitro SL x 1 however HypoT with SBP upper 80s so will avoid further nitro.  HS trop 38 (decreased from prior), BNP 700s (increased from prior).CXR with pulm edema, all most c/w ADHF- Lasix 40 IV x 1 given.    Concern for untreated sepsis with ongoing hypothermia requiring jeffrey hugger despite Vanc/Zosyn for over 24 hours, so broadened to Vanc/Sher/Dinah, consulted ID, repeated BCx x 2. Lactate wnl.  Re: concern for PE, Known LIJ DVT, apixaban x several days then stopped (likely for hemorrhage of vocal fold)- discussed w ENT 4/20, ok to resume anticoag if needed. PE workup- F/u STAT UE and LE Dopplers. Unable to check CTA chest given MARQUEZ, unable to check V/Q given pulm edema. Anticoagulation with Apixaban. If no improvement over next 12-24 hours, Pall Care consult- pt plugged in with Pall Care outpt, has had 2 virtual visits w Dr Mary Velasquez.        Interval History: Pt seen and examined this morning on rounds. NAEON. Leukocytosis 14K and tachycardia new, worsened Cr to 1.9, recurrent HyperNa, and Dopplers showed known LUE DVT, no LE DVT however nondiagnostic for LLE, treating with Eliquis for DVT and presumed PE.  Mental status is better today, voice is stronger.  ID consulted, appreciate recs, stopped Dinah and continuing Vanc/Sher.  F/u UCx and BCx. New c/o L knee pain and XR abnormal, consulted Ortho, appreciate recs, no c/f septic joint.       Care plan reviewed with pt and daughter Marina at bedside. Otherwise, doing well and with no further complaints at this time.     Review of Systems   All other systems reviewed and are negative.    Objective:     Vital Signs  (Most Recent):  Temp: 97.2 °F (36.2 °C) (04/21/25 1934)  Pulse: 77 (04/21/25 1934)  Resp: 16 (04/21/25 1934)  BP: (!) 144/65 (04/21/25 1934)  SpO2: 96 % (04/21/25 1934) Vital Signs (24h Range):  Temp:  [97.2 °F (36.2 °C)-97.9 °F (36.6 °C)] 97.2 °F (36.2 °C)  Pulse:  [] 77  Resp:  [16-18] 16  SpO2:  [92 %-96 %] 96 %  BP: (114-144)/(55-77) 144/65     Weight: 73.4 kg (161 lb 13.1 oz)  Body mass index is 27.78 kg/m².    Intake/Output Summary (Last 24 hours) at 4/21/2025 2053  Last data filed at 4/21/2025 1427  Gross per 24 hour   Intake 150 ml   Output 500 ml   Net -350 ml         Physical Exam  Constitutional:       General: She is not in acute distress.     Appearance: She is not toxic-appearing or diaphoretic.   HENT:      Head: Normocephalic and atraumatic.      Mouth/Throat:      Mouth: Mucous membranes are moist.   Eyes:      General: No scleral icterus.     Conjunctiva/sclera: Conjunctivae normal.   Cardiovascular:      Rate and Rhythm: Normal rate and regular rhythm.      Heart sounds: Normal heart sounds. No murmur heard.  Pulmonary:      Breath sounds: Examination of the right-upper field reveals decreased breath sounds. Examination of the left-upper field reveals decreased breath sounds. Examination of the left-middle field reveals decreased breath sounds. Decreased breath sounds present. No wheezing, rhonchi or rales.   Abdominal:      General: Abdomen is flat. There is no distension.      Palpations: Abdomen is soft.      Tenderness: There is no abdominal tenderness. There is no guarding or rebound.   Musculoskeletal:         General: No signs of injury.      Right lower leg: Edema (trace to 1+, pitting) present.      Left lower leg: Edema (trace to 1+, pitting) present.   Skin:     General: Skin is warm and dry.   Neurological:      General: No focal deficit present.      Mental Status: She is alert and oriented to person, place, and time. Mental status is at baseline.   Psychiatric:         Mood and  Affect: Mood normal.         Behavior: Behavior normal.             Significant Labs: All pertinent labs within the past 24 hours have been reviewed.    Significant Imaging: I have reviewed all pertinent imaging results/findings within the past 24 hours.      Assessment & Plan  Complete heart block  RBBB  Shock  -Known RBBB. Admitted with CHB and shock.  Bradycardic with HR in the 20s on arrival, transcutaneous pacing/epi initiated, admitted to CCU  - bradycardia most likely in the setting of electrolyte disturbance and abnormal thyroid function  Plan:  -s/p micra PM placement 4/10 by EP  -correct electrolyte derangements  Sepsis  Hypothermia  Cont Sher/Vanc for now  - f/u BCx and UCx  - f/u ID recs  Acute pulmonary embolism  Left chest pressure  Acute deep vein thrombosis (DVT) of left upper extremity  PRESUMED PE (unable to do imaging at this time as risk > benefit)  CONFIRMED LUE DVT  - Apixaban 10 BID x 7 days then 5 BID  - f/u Echo eval R heart strain  Hypernatremia  Very slowly uptrending Na since admission. Peak 150 on 4/17 for which pt received 250cc LR. Na 149 the following day & pt w/ increased O2 needs & LE edema. Unclear etiology as pt does not appear hypovolemic.   - encourage water PO  - Consider Nephrology consult  - Monitor BMP Q12-24H   Dysphonia  Paralysis of right vocal cord  -Persistent hoarseness since extubation on 4/8  -ENT consulted, status post laryngoscopy 4/15 which noted right TDC paralysis with mild hemorrhage of true cord, adequate glottic closure noted  -Pt uninterested in VC augmentation at this time, needs outpatient SLP follow up  Typical atrial flutter  -s/p ablation 6/2024 without recurrence  -AC discontinued by outpatient EP provider  -Cont BB  - of note, on Eliquis for VTE (not for Afib)  CKD (chronic kidney disease) stage 3, GFR 30-59 ml/min  Creatine stable for now. BMP reviewed- noted Estimated Creatinine Clearance: 22.8 mL/min (A) (based on SCr of 1.9 mg/dL (H)). according  "to latest data. Based on current GFR, CKD stage is stage 4 - GFR 15-29.  Monitor UOP and serial BMP and adjust therapy as needed. Renally dose meds. Avoid nephrotoxic medications and procedures.    Centrilobular emphysema  Prior history of smoking. Continue scheduled inhalers once extubated. Antibiotics and Supplemental oxygen and monitor respiratory status closely.     Chronic respiratory failure with hypoxia  Patient with Hypoxic Respiratory failure which is Chronic.  she is on home oxygen at 2 LPM. Supplemental oxygen was provided and noted-       Signs/symptoms of respiratory failure include- tachypnea, increased work of breathing, use of accessory muscles, and lethargy. Contributing diagnoses includes - underlying COPD   -transition to home bumex    Acute on chronic congestive heart failure  Most recent BNP and echo results are listed below.  No results for input(s): "BNP" in the last 72 hours.     Echo  Result Date: 4/21/2025    Left Ventricle: The left ventricle is normal in size. Normal wall   thickness. There is normal systolic function with a visually estimated   ejection fraction of 55 - 60%.    Right Ventricle: The right ventricle has mild enlargement. Systolic   function is mildly reduced.    Aortic Valve: There is moderate aortic valve sclerosis. There is mild   annular calcification present. Mildly restricted motion.    Mitral Valve: There is severe mitral annular calcification.    Tricuspid Valve: There is mild regurgitation.    Pulmonary Artery: There is moderate pulmonary hypertension. The   estimated pulmonary artery systolic pressure is 51 mmHg.    IVC/SVC: Elevated venous pressure at 15 mmHg.    Left pleural effusion.      Current Heart Failure Medications: have been held due to bradycardia and MARQUEZ     Plan  - HOLD home Bumex for now while tx-ing sepsis, resume asap  - HOLD home hydralazine for same reason  - cont home metop for rate control in afib  - Monitor strict I&Os and daily weights.  - " "Monitor electrolytes, replete PRN for goal K > 4 & Mg > 2  - Telemetry    Orthostatic hypotension  holding midodrine and per history patient had not required it since last hospitalization     Iron deficiency anemia due to chronic blood loss  Anemia is likely due to chronic disease due to Chronic Kidney Disease. Most recent hemoglobin and hematocrit are listed below.  Recent Labs     04/19/25  0823 04/20/25  0411 04/21/25  0520   HGB 11.7* 11.5* 12.0   HCT 37.4 37.2 39.9     Plan  - Monitor serial CBC: Daily  - Transfuse PRBC if patient becomes hemodynamically unstable, symptomatic or H/H drops below 7/21.  - Patient has not received any PRBC transfusions to date  - Patient's anemia is currently stable    Subclinical hypothyroidism  Per Endocrine consult, "Mild, subclinical hypothyroidism - unlikely to be the cause of shock or mental status changes especially given normal fT4. Can start low dose LT4 per OGT, but in cardiac pts and elderly, better to under treat rather than over-treat -- therefore conservative/low dose LT4 recommended. Needs TSH repeated outpatient in 4wks."    Left knee pain  Appreciate Ortho consult 4/21  Cont Lido patch    VTE Risk Mitigation (From admission, onward)           Ordered     apixaban tablet 5 mg  2 times daily        Placed in "Followed by" Linked Group    04/20/25 1900     apixaban tablet 10 mg  2 times daily        Placed in "Followed by" Linked Group    04/20/25 1900     IP VTE HIGH RISK PATIENT  Once         04/06/25 0512     Place sequential compression device  Until discontinued         04/06/25 0454                    Discharge Planning   NOHEMI: 4/23/2025     Code Status: Full Code   Medical Readiness for Discharge Date:   Discharge Plan A: Skilled Nursing Facility   Discharge Delays: (!) Payor Issues            Please place Justification for DME        Ilene Wu MD  Department of Hospital Medicine   [facility] Step[facility]    "

## 2025-04-22 NOTE — ASSESSMENT & PLAN NOTE
Very slowly uptrending Na since admission. Peak 150 on 4/17 for which pt received 250cc LR. Na 149 the following day & pt w/ increased O2 needs & LE edema. Unclear etiology as pt does not appear hypovolemic.   - encourage water PO  - Consider Nephrology consult  - Monitor BMP Q12-24H

## 2025-04-22 NOTE — ASSESSMENT & PLAN NOTE
I have reviewed hospital notes from   service and other specialty providers. I have also reviewed CBC, CMP/BMP,  cultures and imaging with my interpretation as documented.     Recurrent hypothermia of unclear etiology. Hypothermic once again today and without leukocytosis. Urine culture with ESBL K pneumoniae and E faecium. Patient denies urinary tract symptoms however daughters say patient under reports symptoms.  Continue meropenem.  Stop vancomycin.  Start daptomycin.  CK level in am to monitor for daptomycin induced adverse effects.   Contact isolation precautions.  If hypothermia does not resolved with treatment of urine organisms then favor non infectious work up.  Discussed management plan with the staff from  service.

## 2025-04-22 NOTE — PROGRESS NOTES
Isaias Tobias - Cardiology [facility] Medicine  Progress Note    Patient Name: Misa Barajas  MRN: [id number]  Patient Class: IP- Inpatient   Admission Date: 4/6/2025  Length of Stay: 15 days  Attending Physician: Ilene Wu MD  Primary Care Provider: Isela Langston MD    Principal Problem:Complete heart block    HPI:  Ms. Misa Barajas is a 80 yo female with COPD on 2L home O2, HFpEF, AFL s/p RFA (6/17/24 w/ Dr. Church), CKD III, HTN, HLD, chronic venous insufficiency, orthostatic hypotension who was brought via EMS after per history she was found confused and bradycardiac with HR in the 20s.They started epi and transcutaneous pacing. Given Versed for the transcutaneous pacing. They had to bag her in route. We do not have strip of underlying rhythm. Patient obtunded for which she was intubated for airway protection on arrival to the ED and transcutaneous pacing was exchanged to transvenous pacing via RIJ (threshold 0.6). Underlying rate 44 bifascicular block. Known RBBB. Per daughters patient had called them over the phone when they noticed she appeared confused. Due to concerns for a possible syncopal episode EMS was called. Prior to event patient had been complaining of malaise and nausea for which she took about two Zofran pills per the daughters.      She had been recently seen on 3/27/25 at the [facility] visit following recent admission for AHRF 2/2 ADHF and COPD exacerbation. On ED presentation BNP elevated to 2500, troponin elevated to 19, CXR with pulmonary edema. She was started on steroids, nebs and antibiotics in addition to IV diuresis. Updated TTE stable from prior, showing HFpEF with elevated CVP 15. Her O2 requirements improved to baseline with diuresis and she was subsequently discharged. At that visit patient had not required midodrine as blood pressure had remained stable and she was asymptomatic.      Off note, patient was taken off OAC due to bleeding issues and no recurrent of  "typical atrial flutter after ablation.     Overview/Hospital Course:  Admitted to CCU for CHB in s/o hyperkalemia & in the presence of conduction disease/RBBB & LAHB - stable w/ temporary wire. ??Septic shock deemed to be d/t UTI - ?? UA with only 7 WBCs); vasopressors weaned off. HTN; started NG gtt. Acute on chronic hypoxic/hypercapnic RF & obtunded; intubated. Successfully extubated 04/08. MARQUEZ on CKD; improved w/ good UOP response to diuresis. PVCs w/ intermittent pacing by TVP. Switched pip-tazo to amox-clav on 04/09; continued to improve; empiric EED 04/13 for 7 day course. Micra PM by EP placed 04/10. Medically stable for stepdown. Patient was extremely short of breath with physical therapy, chest x-ray concerning for pulmonary edema.  Started patient on IV diuresis, now euvolemic.  ENT was consulted yesterday due to hoarseness of voice.  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 Pt uninterested in any procedure at this time, ENT recommended SLP follow up.  Able to tolerate regular diet at this point.  Now agreeable for SNF, working on placement. Course c/b worsening hypoxia & hyperNa.      Overnight 4/18-4/19, decompensated with recurrent sepsis, hypothermia 92, possible PNA with infiltrates on CXR, Zosyn IV started. Remained hypothermic despite Zosyn x 2 doses and Manny hugger in place (pt uncomfortable/hot and asking for removal), so added Vanc IV STAT. Checked TSH and FT4-  wnl. UA with pyuria however not a clean catch w 33 sq epi, so repeated UA. C/o "stomach" pain, overall picture with the "stomach" pain and septic picture is "exactly what led us to ICU 2 weeks ago" per daughter. +TTP RUQ, RLQ, LLQ.   Poor UOP, only 200cc by mid-shift, and still hyperNa with FWD 1.8L so started gentle hydration with D5W @ 75cc for 24 hrs. May need to diurese again after sepsis resolves. CT C/A/P with ____       Interval History: Mental status much improved today, however still " with mild confusion and feels very tired.   Pt decompensated further today with recurrent MARQUEZ, C/o new SOB and hypoxemic 90% on 4L (over her baseline of 2L at home), improved to >94% on 6L with improvement of SOB.  C/o new L chest pressure/pain 4/10 (non-radiating), c/f MI or PE, resolved after nitro SL x 1 however HypoT with SBP upper 80s so will avoid further nitro.  HS trop 38 (decreased from prior), BNP 700s (increased from prior).CXR with pulm edema, all most c/w ADHF- Lasix 40 IV x 1 given.    Concern for untreated sepsis with ongoing hypothermia requiring jeffrey hugger despite Vanc/Zosyn for over 24 hours, so broadened to Vanc/Sher/Dinah, consulted ID, repeated BCx x 2. Lactate wnl.  Re: concern for PE, Known LIJ DVT, apixaban x several days then stopped (likely for hemorrhage of vocal fold)- discussed w ENT 4/20, ok to resume anticoag if needed. PE workup- F/u STAT UE and LE Dopplers. Unable to check CTA chest given MARQUEZ, unable to check V/Q given pulm edema. Anticoagulation with Apixaban. If no improvement over next 12-24 hours, Pall Care consult- pt plugged in with [facility] outpt, has had 2 virtual visits w Dr Mary Velasquez.      Discussed with dtr Marina in person in am, discussed with Marina and Esther in pm on conference call.     Review of Systems   All other systems reviewed and are negative.    Objective:     Vital Signs (Most Recent):  Temp: 97.5 °F (36.4 °C) (04/20/25 0720)  Pulse: 84 (04/20/25 0734)  Resp: 17 (04/20/25 0720)  BP: (!) 122/58 (04/20/25 0720)  SpO2: (!) 90 % (04/20/25 0720) Vital Signs (24h Range):  Temp:  [95.1 °F (35.1 °C)-98.6 °F (37 °C)] 97.5 °F (36.4 °C)  Pulse:  [70-87] 84  Resp:  [17-18] 17  SpO2:  [90 %-95 %] 90 %  BP: ()/(51-58) 122/58     Weight: 73.4 kg (161 lb 13.1 oz)  Body mass index is 27.78 kg/m².    Intake/Output Summary (Last 24 hours) at 4/20/2025 0931  Last data filed at 4/19/2025 1017  Gross per 24 hour   Intake --   Output 200 ml   Net -200 ml         Physical  Exam  Constitutional:       General: She is not in acute distress.     Appearance: She is not toxic-appearing or diaphoretic.   HENT:      Head: Normocephalic and atraumatic.      Mouth/Throat:      Mouth: Mucous membranes are moist.   Eyes:      General: No scleral icterus.     Conjunctiva/sclera: Conjunctivae normal.   Cardiovascular:      Rate and Rhythm: Normal rate and regular rhythm.      Heart sounds: Normal heart sounds. No murmur heard.  Pulmonary:      Breath sounds: Examination of the right-upper field reveals decreased breath sounds. Examination of the left-upper field reveals decreased breath sounds. Examination of the left-middle field reveals decreased breath sounds. Decreased breath sounds present. No wheezing, rhonchi or rales.   Abdominal:      General: Abdomen is flat. There is no distension.      Palpations: Abdomen is soft.      Tenderness: There is abdominal tenderness. There is no guarding or rebound.   Musculoskeletal:         General: No signs of injury.      Right lower leg: Edema (trace to 1+, pitting) present.      Left lower leg: Edema (trace to 1+, pitting) present.   Skin:     General: Skin is warm and dry.   Neurological:      General: No focal deficit present.      Mental Status: She is alert and oriented to person, place, and time. Mental status is at baseline.   Psychiatric:         Mood and Affect: Mood normal.         Behavior: Behavior normal.               Significant Labs: All pertinent labs within the past 24 hours have been reviewed.    Significant Imaging: I have reviewed all pertinent imaging results/findings within the past 24 hours.      Assessment & Plan  Complete heart block  RBBB  Shock  -Known RBBB. Admitted with CHB and shock.  Bradycardic with HR in the 20s on arrival, transcutaneous pacing/epi initiated, admitted to CCU  - bradycardia most likely in the setting of electrolyte disturbance and abnormal thyroid function  Plan:  -s/p micra PM placement 4/10 by  "EP  -correct electrolyte derangements  Sepsis  Recurrent on 4/18 pm with hypothermia 92 and "stomach" pain--> did not improve after Zosyn, added Vanc IV with improvement to 96. DDx intra-abd infection (colitis, abscess, gallstone pancreatitis, other), PNA, UTI, multifactorial, other. H/o MSSA/enterococcus/kleb UTI, cat scratch infection, gallbladder pancreatitis (Txed medically)  - Zosyn and Vanc IV   - f/u BCx closely  Hypernatremia  Very slowly uptrending Na since admission. Peak 150 on 4/17 for which pt received 250cc LR. Na 149 the following day & pt w/ increased O2 needs & LE edema. Unclear etiology as pt does not appear hypovolemic.   - encourage water PO  - D5W IV @ 75 cc/hr x 24 hrs  - Consider Nephrology consult  - Monitor BMP Q12-24H   Dysphonia  Paralysis of right vocal cord  -Persistent hoarseness since extubation on 4/8  -ENT consulted, status post laryngoscopy 4/15 which noted right TDC paralysis with mild hemorrhage of true cord, adequate glottic closure noted  -Pt uninterested in VC augmentation at this time, needs outpatient SLP follow up\  Typical atrial flutter  -s/p ablation 6/2024 without recurrence  -AC discontinued by outpatient EP provider  -Cont BB  CKD (chronic kidney disease) stage 3, GFR 30-59 ml/min  Creatine stable for now. BMP reviewed- noted Estimated Creatinine Clearance: 22.8 mL/min (A) (based on SCr of 1.9 mg/dL (H)). according to latest data. Based on current GFR, CKD stage is stage 4 - GFR 15-29.  Monitor UOP and serial BMP and adjust therapy as needed. Renally dose meds. Avoid nephrotoxic medications and procedures.    Centrilobular emphysema  Prior history of smoking. Continue scheduled inhalers once extubated. Antibiotics and Supplemental oxygen and monitor respiratory status closely.     Chronic respiratory failure with hypoxia  Patient with Hypoxic Respiratory failure which is Chronic.  she is on home oxygen at 2 LPM. Supplemental oxygen was provided and noted-     " "  Signs/symptoms of respiratory failure include- tachypnea, increased work of breathing, use of accessory muscles, and lethargy. Contributing diagnoses includes - underlying COPD   -transition to home bumex    Acute on chronic congestive heart failure  Most recent BNP and echo results are listed below.  No results for input(s): "BNP" in the last 72 hours.     Echo  Result Date: 4/21/2025    Left Ventricle: The left ventricle is normal in size. Normal wall   thickness. There is normal systolic function with a visually estimated   ejection fraction of 55 - 60%.    Right Ventricle: The right ventricle has mild enlargement. Systolic   function is mildly reduced.    Aortic Valve: There is moderate aortic valve sclerosis. There is mild   annular calcification present. Mildly restricted motion.    Mitral Valve: There is severe mitral annular calcification.    Tricuspid Valve: There is mild regurgitation.    Pulmonary Artery: There is moderate pulmonary hypertension. The   estimated pulmonary artery systolic pressure is 51 mmHg.    IVC/SVC: Elevated venous pressure at 15 mmHg.    Left pleural effusion.      Current Heart Failure Medications: have been held due to bradycardia and MARQUEZ     Plan  - HOLD home Bumex for now while tx-ing sepsis, resume asap  - HOLD home hydralazine for same reason  - cont hoem metop for rate control in afib  - Monitor strict I&Os and daily weights.  - Monitor electrolytes, replete PRN for goal K > 4 & Mg > 2  - Telemetry    Orthostatic hypotension  holding midodrine and per history patient had not required it since last hospitalization     Iron deficiency anemia due to chronic blood loss  Anemia is likely due to chronic disease due to Chronic Kidney Disease. Most recent hemoglobin and hematocrit are listed below.  Recent Labs     04/19/25  0823 04/20/25  0411 04/21/25  0520   HGB 11.7* 11.5* 12.0   HCT 37.4 37.2 39.9     Plan  - Monitor serial CBC: Daily  - Transfuse PRBC if patient becomes " "hemodynamically unstable, symptomatic or H/H drops below 7/21.  - Patient has not received any PRBC transfusions to date  - Patient's anemia is currently stable    Subclinical hypothyroidism  Per Endocrine consult, "Mild, subclinical hypothyroidism - unlikely to be the cause of shock or mental status changes especially given normal fT4. Can start low dose LT4 per OGT, but in cardiac pts and elderly, better to under treat rather than over-treat -- therefore conservative/low dose LT4 recommended. Needs TSH repeated outpatient in 4wks."    Left chest pressure    Hypothermia      VTE Risk Mitigation (From admission, onward)           Ordered     apixaban tablet 5 mg  2 times daily        Placed in "Followed by" Linked Group    04/20/25 1900     apixaban tablet 10 mg  2 times daily        Placed in "Followed by" Linked Group    04/20/25 1900     IP VTE HIGH RISK PATIENT  Once         04/06/25 0512     Place sequential compression device  Until discontinued         04/06/25 0454                    Discharge Planning   NOHEMI: 4/23/2025     Code Status: Full Code   Medical Readiness for Discharge Date:   Discharge Plan A: Skilled Nursing Facility   Discharge Delays: (!) Payor Issues      Ilene Wu MD  Department of Hospital Medicine   [facility] - Cardiology Stepdown    "

## 2025-04-22 NOTE — ASSESSMENT & PLAN NOTE
Anemia is likely due to chronic disease due to Chronic Kidney Disease. Most recent hemoglobin and hematocrit are listed below.  Recent Labs     04/20/25  0411 04/21/25  0520 04/22/25  0450   HGB 11.5* 12.0 11.7*   HCT 37.2 39.9 38.6     Plan  - Monitor serial CBC: Daily  - Transfuse PRBC if patient becomes hemodynamically unstable, symptomatic or H/H drops below 7/21.  - Patient has not received any PRBC transfusions to date  - Patient's anemia is currently stable

## 2025-04-22 NOTE — PROGRESS NOTES
VANCOMYCIN DOSING BY PHARMACY DISCONTINUATION NOTE    Misa Barajas is a 79 y.o. female who had been consulted for vancomycin dosing.    The pharmacy consult for vancomycin dosing has been discontinued.     Vancomycin Dosing by Pharmacy Consult will sign-off. Please reconsult if necessary. Thank you for allowing us to participate in this patient's care.     Candida Rincon, AkbarD

## 2025-04-22 NOTE — PROGRESS NOTES
Pharmacokinetic Assessment Follow Up: IV Vancomycin    Vancomycin serum concentration assessment(s):    Vanc R = 14.7 mcg/mL. MARQUEZ improving, SCR 1.6    Vancomycin Regimen Plan:  Vancomycin 500 mg IV x 1  Obtain concentration in AM 4/23  Goal trough = 15-20 mcg/mL    Thank you for the consult, will continue to follow    Baltazar Muniz Pharm.D., BCPS  [id number]         Patient brief summary:  Misa Barajas is a 79 y.o. female initiated on antimicrobial therapy with IV Vancomycin for treatment of sepsis      Drug levels (last 3 results):  Recent Labs   Lab Result Units 04/20/25  0412 04/21/25  0520 04/22/25  0450   Vancomycin Random ug/ml 16.8 23.8 14.7       Drug Allergies:   Review of patient's allergies indicates:   Allergen Reactions    Adhesive tape-silicones     Adhesive Rash     Pt states she removed a LATEX bandaid and the skin beneath was swollen and red. No other latex causes a reaction.       Actual Body Weight:   73 kg    Renal Function:   Estimated Creatinine Clearance: 27.1 mL/min (A) (based on SCr of 1.6 mg/dL (H)).,       CBC (last 72 hours):  Recent Labs   Lab Result Units 04/19/25  0823 04/20/25  0411 04/21/25  0520 04/22/25  0450   WBC K/uL 7.83 10.94 14.60* 8.84   HGB gm/dL 11.7* 11.5* 12.0 11.7*   HCT % 37.4 37.2 39.9 38.6   Platelet Count K/uL 177 175 183 178   Lymph % %  --   --  7.3* 14.4*   Mono % %  --   --  10.4 13.0   Eos % %  --   --  1.0 3.2   Basophil % %  --   --  0.8 1.0       Metabolic Panel (last 72 hours):  Recent Labs   Lab Result Units 04/19/25  0823 04/19/25  1015 04/20/25  0412 04/20/25  1304 04/21/25  0520 04/22/25  0450   Sodium mmol/L 149*  --  145 149* 150* 145   Potassium mmol/L 4.1  --  3.9 4.0 4.3 3.6   Chloride mmol/L 118*  --  113* 115* 115* 112*   CO2 mmol/L 24  --  24 25 25 28   Glucose mg/dL 93  --  84 110 78 108   Glucose, UA   --  Negative  --   --   --   --    BUN mg/dL 46*  --  44* 42* 45* 46*   Creatinine mg/dL 1.2  --  1.5* 1.6* 1.9* 1.6*   Albumin g/dL 2.4*  --   2.3*  2.2* 2.1* 2.3* 2.1*   Bilirubin Total mg/dL  --   --  0.5 0.5 0.6 0.5   ALP unit/L  --   --  93 90 104 93   AST unit/L  --   --  37 35 37 34   ALT unit/L  --   --  20 19 20 17   Magnesium  mg/dL 1.9  --  2.1  --  2.2 2.2   Phosphorus Level mg/dL 2.9  --  3.1  --   --   --        Vancomycin Administrations:  vancomycin given in the last 96 hours                     vancomycin 1,500 mg in 0.9% NaCl 250 mL IVPB (admixture device) (mg) 1,500 mg New Bag 04/19/25 1106                    Microbiologic Results:  Microbiology Results (last 7 days)       Procedure Component Value Units Date/Time    Blood culture [[id number]]  (Normal) Collected: 04/18/25 2005    Order Status: Completed Specimen: Blood from Peripheral, Antecubital, Right Updated: 04/22/25 0006     Blood Culture No Growth After 72 Hours    Blood culture [[id number]]  (Normal) Collected: 04/18/25 2006    Order Status: Completed Specimen: Blood from Peripheral, Wrist, Right Updated: 04/22/25 0006     Blood Culture No Growth After 72 Hours    Blood culture [[id number]]  (Normal) Collected: 04/20/25 1736    Order Status: Completed Specimen: Blood Updated: 04/21/25 2100     Blood Culture No Growth After 24 Hours    Blood culture [[id number]]  (Normal) Collected: 04/20/25 1739    Order Status: Completed Specimen: Blood Updated: 04/21/25 2100     Blood Culture No Growth After 24 Hours    Culture, Anaerobe [[id number]]     Order Status: Sent Specimen: Joint Fluid from Knee, Left     Aerobic culture [[id number]]     Order Status: Sent Specimen: Bone from Knee, Left     Gram stain [[id number]]     Order Status: Sent Specimen: Joint Fluid from Knee, Left     AFB Culture & Smear [[id number]]     Order Status: Sent Specimen: Joint Fluid from Knee, Left     Fungus culture [[id number]]     Order Status: Sent Specimen: Joint Fluid, Left Knee     Urine culture [[id number]] Collected: 04/19/25 1015    Order Status: Completed Specimen: Urine Updated: 04/21/25 0016      Urine Culture Multiple organisms isolated. None in predominance. Repeat if clinically necessary.    Urine culture [[id number]]  (Abnormal) Collected: 04/19/25 0121    Order Status: Completed Specimen: Urine Updated: 04/21/25 0008     Urine Culture >100,000 cfu/ml Gram-negative Rods     Comment: Identification and susceptibility pending         50,000 - 99,999 cfu/ml Enterococcus faecium     Comment: Susceptibility pending       Culture, Respiratory with Gram Stain [[id number]]     Order Status: Sent Specimen: Respiratory

## 2025-04-22 NOTE — ASSESSMENT & PLAN NOTE
Creatine stable for now. BMP reviewed- noted Estimated Creatinine Clearance: 22.8 mL/min (A) (based on SCr of 1.9 mg/dL (H)). according to latest data. Based on current GFR, CKD stage is stage 4 - GFR 15-29.  Monitor UOP and serial BMP and adjust therapy as needed. Renally dose meds. Avoid nephrotoxic medications and procedures.

## 2025-04-22 NOTE — ASSESSMENT & PLAN NOTE
-Persistent hoarseness since extubation on 4/8  -ENT consulted, status post laryngoscopy 4/15 which noted right TDC paralysis with mild hemorrhage of true cord, adequate glottic closure noted  -Pt uninterested in VC augmentation at this time, needs outpatient SLP follow up   1-2 drinks

## 2025-04-22 NOTE — SUBJECTIVE & OBJECTIVE
Interval History: Pt seen and examined this morning on jack. GIDEONON. Leukocytosis 14K and tachycardia new, worsened Cr to 1.9, recurrent HyperNa, and Dopplers showed known LUE DVT, no LE DVT however nondiagnostic for LLE, treating with Eliquis for DVT and presumed PE.  Mental status is better today, voice is stronger.  ID consulted, appreciate recs, stopped Dinah and continuing Vanc/Sher.  F/u UCx and BCx. New c/o L knee pain and XR abnormal, consulted Ortho, appreciate recs, no c/f septic joint.       Care plan reviewed with pt and daughter Marina at bedside. Otherwise, doing well and with no further complaints at this time.     Review of Systems   All other systems reviewed and are negative.    Objective:     Vital Signs (Most Recent):  Temp: 97.2 °F (36.2 °C) (04/21/25 1934)  Pulse: 77 (04/21/25 1934)  Resp: 16 (04/21/25 1934)  BP: (!) 144/65 (04/21/25 1934)  SpO2: 96 % (04/21/25 1934) Vital Signs (24h Range):  Temp:  [97.2 °F (36.2 °C)-97.9 °F (36.6 °C)] 97.2 °F (36.2 °C)  Pulse:  [] 77  Resp:  [16-18] 16  SpO2:  [92 %-96 %] 96 %  BP: (114-144)/(55-77) 144/65     Weight: 73.4 kg (161 lb 13.1 oz)  Body mass index is 27.78 kg/m².    Intake/Output Summary (Last 24 hours) at 4/21/2025 2053  Last data filed at 4/21/2025 1427  Gross per 24 hour   Intake 150 ml   Output 500 ml   Net -350 ml         Physical Exam  Constitutional:       General: She is not in acute distress.     Appearance: She is not toxic-appearing or diaphoretic.   HENT:      Head: Normocephalic and atraumatic.      Mouth/Throat:      Mouth: Mucous membranes are moist.   Eyes:      General: No scleral icterus.     Conjunctiva/sclera: Conjunctivae normal.   Cardiovascular:      Rate and Rhythm: Normal rate and regular rhythm.      Heart sounds: Normal heart sounds. No murmur heard.  Pulmonary:      Breath sounds: Examination of the right-upper field reveals decreased breath sounds. Examination of the left-upper field reveals decreased breath  sounds. Examination of the left-middle field reveals decreased breath sounds. Decreased breath sounds present. No wheezing, rhonchi or rales.   Abdominal:      General: Abdomen is flat. There is no distension.      Palpations: Abdomen is soft.      Tenderness: There is no abdominal tenderness. There is no guarding or rebound.   Musculoskeletal:         General: No signs of injury.      Right lower leg: Edema (trace to 1+, pitting) present.      Left lower leg: Edema (trace to 1+, pitting) present.   Skin:     General: Skin is warm and dry.   Neurological:      General: No focal deficit present.      Mental Status: She is alert and oriented to person, place, and time. Mental status is at baseline.   Psychiatric:         Mood and Affect: Mood normal.         Behavior: Behavior normal.             Significant Labs: All pertinent labs within the past 24 hours have been reviewed.    Significant Imaging: I have reviewed all pertinent imaging results/findings within the past 24 hours.

## 2025-04-22 NOTE — ASSESSMENT & PLAN NOTE
-s/p ablation 6/2024 without recurrence  -AC discontinued by outpatient EP provider  -Cont BB  - of note, on Eliquis for VTE (not for Afib)

## 2025-04-22 NOTE — ASSESSMENT & PLAN NOTE
PRESUMED PE (unable to do imaging at this time as risk > benefit)  CONFIRMED LUE DVT  - Apixaban 10 BID x 7 days then 5 BID  - TTE with mild R heart strain

## 2025-04-22 NOTE — SUBJECTIVE & OBJECTIVE
"Interval History: "OK". Patient with hypothermia of unclear etiology. Urine culture with ESBL Klebsiella and E faecium. Hypothermic today without localizing symptoms.     Review of Systems   Constitutional:  Negative for chills, fatigue, fever and unexpected weight change.   HENT:  Negative for ear pain, facial swelling, hearing loss, mouth sores, nosebleeds, rhinorrhea, sinus pressure, sore throat, tinnitus, trouble swallowing and voice change.    Eyes:  Negative for photophobia, pain, redness and visual disturbance.   Respiratory:  Negative for cough, chest tightness, shortness of breath and wheezing.    Cardiovascular:  Negative for chest pain, palpitations and leg swelling.   Gastrointestinal:  Negative for abdominal pain, blood in stool, constipation, diarrhea, nausea and vomiting.   Endocrine: Negative for cold intolerance, heat intolerance, polydipsia, polyphagia and polyuria.   Genitourinary:  Negative for decreased urine volume, dysuria, flank pain, frequency, hematuria, menstrual problem, urgency, vaginal bleeding, vaginal discharge and vaginal pain.   Musculoskeletal:  Negative for arthralgias, back pain, joint swelling, myalgias and neck pain.   Skin:  Negative for rash.   Allergic/Immunologic: Negative for environmental allergies, food allergies and immunocompromised state.   Neurological:  Negative for dizziness, seizures, syncope, weakness, light-headedness, numbness and headaches.   Hematological:  Negative for adenopathy. Does not bruise/bleed easily.   Psychiatric/Behavioral:  Negative for confusion, hallucinations, self-injury, sleep disturbance and suicidal ideas. The patient is not nervous/anxious.      Objective:     Vital Signs (Most Recent):  Temp: 97.4 °F (36.3 °C) (04/22/25 1450)  Pulse: (!) 57 (04/22/25 1450)  Resp: 20 (04/22/25 1450)  BP: (!) 117/59 (04/22/25 1450)  SpO2: 95 % (04/22/25 1450) Vital Signs (24h Range):  Temp:  [91.4 °F (33 °C)-97.5 °F (36.4 °C)] 97.4 °F (36.3 °C)  Pulse:  " [54-81] 57  Resp:  [16-20] 20  SpO2:  [93 %-96 %] 95 %  BP: (104-144)/(50-65) 117/59     Weight: 68.8 kg (151 lb 10.8 oz)  Body mass index is 26.04 kg/m².    Estimated Creatinine Clearance: 26.3 mL/min (A) (based on SCr of 1.6 mg/dL (H)).     Physical Exam  Vitals and nursing note reviewed.   Constitutional:       Appearance: She is well-developed.   HENT:      Head: Normocephalic and atraumatic.      Right Ear: External ear normal.      Left Ear: External ear normal.   Eyes:      General: No scleral icterus.        Right eye: No discharge.         Left eye: No discharge.      Conjunctiva/sclera: Conjunctivae normal.   Cardiovascular:      Rate and Rhythm: Normal rate and regular rhythm.      Heart sounds: Normal heart sounds. No murmur heard.     No friction rub. No gallop.   Pulmonary:      Effort: Pulmonary effort is normal. No respiratory distress.      Breath sounds: Normal breath sounds. No stridor. No wheezing or rales.   Abdominal:      General: Bowel sounds are normal.   Musculoskeletal:         General: Swelling (upper extremities) present. No tenderness. Normal range of motion.   Skin:     General: Skin is warm and dry.   Neurological:      Mental Status: She is alert and oriented to person, place, and time.          Significant Labs: CBC:   Recent Labs   Lab 04/21/25  0520 04/22/25  0450   WBC 14.60* 8.84   HGB 12.0 11.7*   HCT 39.9 38.6    178     CMP:   Recent Labs   Lab 04/21/25  0520 04/22/25  0450   * 145   K 4.3 3.6   * 112*   CO2 25 28   BUN 45* 46*   CREATININE 1.9* 1.6*   CALCIUM 10.0 9.6   ALBUMIN 2.3* 2.1*   BILITOT 0.6 0.5   ALKPHOS 104 93   AST 37 34   ALT 20 17   ANIONGAP 10 5*     Microbiology Results (last 7 days)       Procedure Component Value Units Date/Time    Urine culture [[id number]]  (Abnormal)  (Susceptibility) Collected: 04/19/25 0121    Order Status: Completed Specimen: Urine Updated: 04/22/25 1148     Urine Culture >100,000 cfu/ml Klebsiella pneumoniae esbl       50,000 - 99,999 cfu/ml Enterococcus faecium     Comment: Susceptibility pending       Blood culture [[id number]]  (Normal) Collected: 04/20/25 1736    Order Status: Completed Specimen: Blood Updated: 04/22/25 0900     Blood Culture No Growth After 36 Hours    Blood culture [[id number]]  (Normal) Collected: 04/20/25 1739    Order Status: Completed Specimen: Blood Updated: 04/22/25 0900     Blood Culture No Growth After 36 Hours    Blood culture [[id number]]  (Normal) Collected: 04/18/25 2005    Order Status: Completed Specimen: Blood from Peripheral, Antecubital, Right Updated: 04/22/25 0006     Blood Culture No Growth After 72 Hours    Blood culture [[id number]]  (Normal) Collected: 04/18/25 2006    Order Status: Completed Specimen: Blood from Peripheral, Wrist, Right Updated: 04/22/25 0006     Blood Culture No Growth After 72 Hours    Culture, Anaerobe [[id number]]     Order Status: Sent Specimen: Joint Fluid from Knee, Left     Aerobic culture [[id number]]     Order Status: Sent Specimen: Bone from Knee, Left     Gram stain [[id number]]     Order Status: Sent Specimen: Joint Fluid from Knee, Left     AFB Culture & Smear [[id number]]     Order Status: Sent Specimen: Joint Fluid from Knee, Left     Fungus culture [[id number]]     Order Status: Sent Specimen: Joint Fluid, Left Knee     Urine culture [[id number]] Collected: 04/19/25 1015    Order Status: Completed Specimen: Urine Updated: 04/21/25 0016     Urine Culture Multiple organisms isolated. None in predominance. Repeat if clinically necessary.    Culture, Respiratory with Gram Stain [[id number]]     Order Status: Sent Specimen: Respiratory             Significant Imaging: I have reviewed all pertinent imaging results/findings within the past 24 hours.

## 2025-04-22 NOTE — PT/OT/SLP PROGRESS
"Physical Therapy Co-Treatment    Patient Name: Misa Barajas   MRN: [id number]    Co-treatment performed for this visit due to patient need for two skilled therapists to ensure patient and staff safety and to accommodate for patient activity tolerance/pain management   Recommendations:     Discharge Recommendations: Moderate Intensity Therapy  Discharge Equipment Recommendations: walker, rolling  Barriers to Discharge: Increased level of assist and Decreased caregiver support  Safest Mobility Level with Nursing: Stand pivot transfer with moderate assistance    Assessment:     Misa Barajas is a 79 y.o. female admitted with a medical diagnosis of Complete heart block. She presents with the following impairments/functional limitations: weakness, impaired endurance, impaired functional mobility, impaired self care skills, gait instability, impaired balance, decreased safety awareness, impaired cardiopulmonary response to activity, decreased coordination, decreased lower extremity function, edema. Pt presenting with HOB elevated and agreeable to completing therapy session. Pt minimally verbal throughout session with infrequent whispered responses following increased cues and repeated commands. Pt requiring increased assistance to complete OOB functional mobility 2/2 decreased activity tolerance, generalized weakness, decreased motor initiation, and decreased postural control. Pt declining to attempt forward ambulation trial stating, "I just can't," despite maximal education and encouragement. Pt able to complete standing BLE marches and lateral steps with bedside chair with increased assistance for lateral weight shifting. Pt remains appropriate for moderate intensity therapy following discharge once medically stable. Pt would continue to benefit from skilled acute PT in order to address current deficits and progress functional mobility.     Rehab Prognosis: Fair; patient continues to benefit from acute skilled PT " "services to address these deficits and reach maximum level of function.  Recent Surgery: Procedure(s) (LRB):  INSERTION, CARDIAC PACEMAKER, LEADLESS (N/A)  Removal, Pacemaker, Temporary Transvenous 12 Days Post-Op    Plan:     During this hospitalization, patient to be seen 4 x/week to address the identified rehab impairments via therapeutic activities, gait training, therapeutic exercises, neuromuscular re-education and progress toward the following goals:    Plan of Care Expires:  05/13/25    Subjective     Chief Complaint: None verbalized  Patient/Family Comments/Goals: "I just can't. I'm too weak."  Pain/Comfort:  Pain Rating 1: 0/10    Objective:     Communicated with RN prior to session. Patient found HOB elevated with telemetry, oxygen, PureWick, peripheral IV (jeffrey hugger) upon PT entry to room.     General Precautions: Standard, fall  Orthopedic Precautions: N/A  Braces: N/A    Functional Mobility:  Bed Mobility:    Scooting: stand by assistance  Supine to Sit: stand by assistance  Transfers:    Sit to Stand: x2 reps from EOB, moderate assistance with rolling walker with cues for hand placement and foot placement  Verbal cues for increased use of momentum, improved anterior weight shift, and increased BLE motor activation  Bed to Chair: moderate assistance with rolling walker with cues for hand placement and foot placement using Step Transfer  Verbal cues for sequencing and technique  Gait: Patient ambulated 3' laterally to L with rolling walker and moderate assistance.   Patient demonstrates unsteady gait, decreased step length, narrow base of support, decreased weight shift, decreased foot clearance, ambulates outside VITOR of RW, flexed posture, decreased faith, inconsistent bilateral foot placement, and decreased bilateral knee stability.  Patient required cues for upright posture, gluteal activation, sequencing, rolling walker management, safe rolling walker usage, to ambulate within VITOR of RW, " increased step size, foot placement, increased foot clearance, and increased VITOR.  All lines remained intact throughout ambulation trial, gait belt utilized, portable Supplemental O2 6L utilized.  Balance:   Static Sitting: Good, able to maintain for 4 minute(s) with stand by assistance  Dynamic Sitting: Good: Patient accepts moderate challenge, stand by assistance  Pt completed UE dressing while seated at EOB  Static Standing: Fair, able to maintain for 2 minute(s) with minimum assistance 2/2 posterior lean  Verbal cues for upright posture, increased hip extension, increased knee extension, and maintenance of midline orientation  Dynamic Standing: Poor: Patient unable to accept challenge or move without loss of balance, moderate assistance  Patient performed 1 set(s) of 10 repetitions of  the following standing exercises: marches for bilateral LE. Patient required skilled PT for instruction of exercises and appropriate cues to perform exercises safely and appropriately.      AM-PAC 6 CLICK MOBILITY  Turning over in bed (including adjusting bedclothes, sheets and blankets)?: 3  Sitting down on and standing up from a chair with arms (e.g., wheelchair, bedside commode, etc.): 2  Moving from lying on back to sitting on the side of the bed?: 3  Moving to and from a bed to a chair (including a wheelchair)?: 2  Need to walk in hospital room?: 2  Climbing 3-5 steps with a railing?: 1  Basic Mobility Total Score: 13     Therapeutic Activities and Exercises:  Patient educated on role of acute care PT and PT POC, safety while in hospital including calling nurse for mobility, and call light usage  Pt educated on the effects of bed rest and the importance of OOB activity. Pt encouraged to sit UIC majority of day as tolerated and continue daily transfers with nursing assist. Pt verbalized understanding.  Pt educated on importance of maximal participation in therapy session in order to reduce negative effects of prolonged  sedentary positioning.   Answered all questions within PT scope of practice and addressed functional mobility concerns.    Patient left up in chair with all lines intact, call button in reach, and daughter present.    GOALS:   Multidisciplinary Problems       Physical Therapy Goals          Problem: Physical Therapy    Goal Priority Disciplines Outcome Interventions   Physical Therapy Goal     PT, PT/OT Progressing    Description: PT goals until 5/13/25    1. Pt supine to sit with CGA - met 4/22/2025  Updated: supervision  2. Pt sit to supine with CGA-not met  3. Pt sit to stand with RW with CGA-not met  4. Pt to perform gait 10ft with RW with CGA.-not met  5. Pt to transfer bed to/from bedside chair with HHA with minimal assist.-not met  6. Pt to up/down 1 step with RW with minimal assist.-not met  7. Pt to perform B LE exs in sitting or supine x 10 reps to strengthen B LE to improve functional mobility.-not met                         DME Justifications:  Patient demonstrates a mobility limitation that significantly impairs their ability to participate in one or more mobility related activities of daily living. Patient's mobility limitation cannot be sufficiently resolved with the use of a cane, but can be sufficiently resolved with the use of a rolling walker.The use of a rolling walker will considerably improve their ability to participate in MRADLs. Patient will use the walker on a regular basis at home.     Time Tracking:     PT Received On: 04/22/25  PT Start Time: 1033     PT Stop Time: 1056  PT Total Time (min): 23 min     Billable Minutes: Therapeutic Exercise 10 and Neuromuscular Re-education 13      Treatment Type: Treatment  PT/PTA: PT     Number of PTA visits since last PT visit: 0     04/22/2025

## 2025-04-22 NOTE — ASSESSMENT & PLAN NOTE
"Most recent BNP and echo results are listed below.  No results for input(s): "BNP" in the last 72 hours.     Echo  Result Date: 4/21/2025    Left Ventricle: The left ventricle is normal in size. Normal wall   thickness. There is normal systolic function with a visually estimated   ejection fraction of 55 - 60%.    Right Ventricle: The right ventricle has mild enlargement. Systolic   function is mildly reduced.    Aortic Valve: There is moderate aortic valve sclerosis. There is mild   annular calcification present. Mildly restricted motion.    Mitral Valve: There is severe mitral annular calcification.    Tricuspid Valve: There is mild regurgitation.    Pulmonary Artery: There is moderate pulmonary hypertension. The   estimated pulmonary artery systolic pressure is 51 mmHg.    IVC/SVC: Elevated venous pressure at 15 mmHg.    Left pleural effusion.      Current Heart Failure Medications: have been held due to bradycardia and MARQUEZ     Plan  - HOLD home Bumex for now while tx-ing sepsis, resume asap  - HOLD home hydralazine for same reason  - cont hoem metop for rate control in afib  - Monitor strict I&Os and daily weights.  - Monitor electrolytes, replete PRN for goal K > 4 & Mg > 2  - Telemetry    "

## 2025-04-22 NOTE — PROGRESS NOTES
Isaias Tobias - Cardiology [facility] Medicine  Progress Note    Patient Name: Misa Barajas  MRN: [id number]  Patient Class: IP- Inpatient   Admission Date: 4/6/2025  Length of Stay: 16 days  Attending Physician: Ilene Wu MD  Primary Care Provider: Isela Langston MD        Subjective     Principal Problem:Complete heart block        HPI:  Ms. Misa Barajas is a 78 yo female with COPD on 2L home O2, HFpEF, AFL s/p RFA (6/17/24 w/ Dr. Church), CKD III, HTN, HLD, chronic venous insufficiency, orthostatic hypotension who was brought via EMS after per history she was found confused and bradycardiac with HR in the 20s.They started epi and transcutaneous pacing. Given Versed for the transcutaneous pacing. They had to bag her in route. We do not have strip of underlying rhythm. Patient obtunded for which she was intubated for airway protection on arrival to the ED and transcutaneous pacing was exchanged to transvenous pacing via RIJ (threshold 0.6). Underlying rate 44 bifascicular block. Known RBBB. Per daughters patient had called them over the phone when they noticed she appeared confused. Due to concerns for a possible syncopal episode EMS was called. Prior to event patient had been complaining of malaise and nausea for which she took about two Zofran pills per the daughters.      She had been recently seen on 3/27/25 at the [facility] visit following recent admission for AHRF 2/2 ADHF and COPD exacerbation. On ED presentation BNP elevated to 2500, troponin elevated to 19, CXR with pulmonary edema. She was started on steroids, nebs and antibiotics in addition to IV diuresis. Updated TTE stable from prior, showing HFpEF with elevated CVP 15. Her O2 requirements improved to baseline with diuresis and she was subsequently discharged. At that visit patient had not required midodrine as blood pressure had remained stable and she was asymptomatic.      Off note, patient was taken off OAC due to bleeding issues  "and no recurrent of typical atrial flutter after ablation.     Overview/Hospital Course:  Admitted to CCU for CHB in s/o hyperkalemia & in the presence of conduction disease/RBBB & LAHB - stable w/ temporary wire. ??Septic shock deemed to be d/t UTI - ?? UA with only 7 WBCs); vasopressors weaned off. HTN; started NG gtt. Acute on chronic hypoxic/hypercapnic RF & obtunded; intubated. Successfully extubated 04/08. MARQUEZ on CKD; improved w/ good UOP response to diuresis. PVCs w/ intermittent pacing by TVP. Switched pip-tazo to amox-clav on 04/09; continued to improve; empiric EED 04/13 for 7 day course. Micra PM by EP placed 04/10. Medically stable for stepdown. Patient was extremely short of breath with physical therapy, chest x-ray concerning for pulmonary edema.  Started patient on IV diuresis, now euvolemic.  ENT was consulted yesterday due to hoarseness of voice.  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 Pt uninterested in any procedure at this time, ENT recommended SLP follow up.  Able to tolerate regular diet at this point.  Now agreeable for SNF, working on placement. Course c/b worsening hypoxia & hyperNa.      Overnight 4/18-4/19, decompensated with recurrent sepsis, hypothermia 92, possible PNA with infiltrates on CXR, Zosyn IV started. Remained hypothermic despite Zosyn x 2 doses and Manny hugger in place (pt uncomfortable/hot and asking for removal), so added Vanc IV STAT. Checked TSH and FT4-  wnl. UA with pyuria however not a clean catch w 33 sq epi, so repeated UA. C/o "stomach" pain, overall picture with the "stomach" pain and septic picture is "exactly what led us to ICU 2 weeks ago" per daughter. +TTP RUQ, RLQ, LLQ.   Poor UOP, only 200cc by mid-shift, and still hyperNa with FWD 1.8L so started gentle hydration with D5W @ 75cc for 24 hrs. May need to diurese again after sepsis resolves. CT C/A/P noncon with pulm edema, B pleural effusions, possible PNA, no " intra-abdominal process seen.     On 4/21, Mental status much improved, however still with mild confusion and feels very tired.   decompensated further with recurrent MARQUEZ, C/o new SOB and hypoxemic 90% on 4L (over her baseline of 2L at home), improved to >94% on 6L with improvement of SOB.  C/o new L chest pressure/pain 4/10 (non-radiating), c/f MI or PE, resolved after nitro SL x 1 however HypoT with SBP upper 80s so will avoid further nitro.  HS trop 38 (decreased from prior), BNP 700s (increased from prior).CXR with pulm edema, all most c/w ADHF- Lasix 40 IV x 1 given.    Concern for untreated sepsis with ongoing hypothermia requiring jeffrey hugger despite Vanc/Zosyn for over 24 hours, so broadened to Vanc/Sher/Dinah, consulted ID, repeated BCx x 2. Lactate wnl.  Re: concern for PE, Known LIJ DVT, apixaban x several days then stopped (likely for hemorrhage of vocal fold)- discussed w ENT 4/20, ok to resume anticoag if needed. PE workup- STAT UE and LE Dopplers. Unable to check CTA chest given MARQUEZ, unable to check V/Q given pulm edema. Anticoagulation with Apixaban.        Interval History: Pt seen and examined this morning on rounds. NISREEN.  Hypothermia returned however pt looks and feels the best she has in 4 days.  Voice is getting stronger.  Sitting in chair.  UCx resulted with ESBL Klebsiella, so Sher has been good coverage.  ID changed to Dapto (from Vanc), continuing Sher, for coverage of UTI and well as PNA.  BCx have all been negative.  Continuing Eliquis for DVt LUE and presumed PE.  Acute hypox resp failure, still on 6L today, will dose Lasix 60 IV x 1 and re-eval tomorrow. HyperNa- improving.     Discussed ACP/GOC, pt agreeable to Palliative Care consult to further discuss. Saw Pall Care x2 via virtual visits prior to this admit. Prior to admit, was independent in her residence with her cat.  Two daughters very supportive.     Care plan reviewed with pt and daughter Marina at bedside.  Otherwise, doing  well and with no further complaints at this time.     Review of Systems   All other systems reviewed and are negative.    Objective:     Vital Signs (Most Recent):  Temp: 97.4 °F (36.3 °C) (04/22/25 1450)  Pulse: 63 (04/22/25 1600)  Resp: 20 (04/22/25 1450)  BP: (!) 117/59 (04/22/25 1450)  SpO2: 95 % (04/22/25 1450) Vital Signs (24h Range):  Temp:  [91.4 °F (33 °C)-97.5 °F (36.4 °C)] 97.4 °F (36.3 °C)  Pulse:  [54-78] 63  Resp:  [16-20] 20  SpO2:  [93 %-96 %] 95 %  BP: (104-144)/(50-65) 117/59     Weight: 68.8 kg (151 lb 10.8 oz)  Body mass index is 26.04 kg/m².    Intake/Output Summary (Last 24 hours) at 4/22/2025 1635  Last data filed at 4/22/2025 1426  Gross per 24 hour   Intake 727 ml   Output 475 ml   Net 252 ml         Physical Exam  Constitutional:       General: She is not in acute distress.     Appearance: She is not toxic-appearing or diaphoretic.   HENT:      Head: Normocephalic and atraumatic.      Mouth/Throat:      Mouth: Mucous membranes are moist.   Eyes:      General: No scleral icterus.     Conjunctiva/sclera: Conjunctivae normal.   Cardiovascular:      Rate and Rhythm: Normal rate and regular rhythm.      Heart sounds: Normal heart sounds. No murmur heard.  Pulmonary:      Breath sounds: Examination of the right-upper field reveals decreased breath sounds. Examination of the left-upper field reveals decreased breath sounds. Examination of the left-middle field reveals decreased breath sounds. Decreased breath sounds present. No wheezing, rhonchi or rales.   Abdominal:      General: Abdomen is flat. There is no distension.      Palpations: Abdomen is soft.      Tenderness: There is no abdominal tenderness. There is no guarding or rebound.   Musculoskeletal:         General: No signs of injury.      Right lower leg: Edema (trace to 1+, pitting) present.      Left lower leg: Edema (trace to 1+, pitting) present.   Skin:     General: Skin is warm and dry.   Neurological:      General: No focal deficit  present.      Mental Status: She is alert and oriented to person, place, and time. Mental status is at baseline.   Psychiatric:         Mood and Affect: Mood normal.         Behavior: Behavior normal.               Significant Labs: All pertinent labs within the past 24 hours have been reviewed.    Significant Imaging: I have reviewed all pertinent imaging results/findings within the past 24 hours.      Assessment & Plan  Complete heart block  RBBB  Shock  -Known RBBB. Admitted with CHB and shock.  Bradycardic with HR in the 20s on arrival, transcutaneous pacing/epi initiated, admitted to CCU  - bradycardia most likely in the setting of electrolyte disturbance and abnormal thyroid function  Plan:  -s/p micra PM placement 4/10 by EP  -correct electrolyte derangements  Sepsis  Hypothermia  Cont Sher/Dapto for now  - f/u BCx and UCx  - f/u ID recs  Acute pulmonary embolism  Left chest pressure  Acute deep vein thrombosis (DVT) of left upper extremity  PRESUMED PE (unable to do imaging at this time as risk > benefit)  CONFIRMED LUE DVT  - Apixaban 10 BID x 7 days then 5 BID  - TTE with mild R heart strain  Hypernatremia  Very slowly uptrending Na since admission. Peak 150 on 4/17 for which pt received 250cc LR. Na 149 the following day & pt w/ increased O2 needs & LE edema. Unclear etiology as pt does not appear hypovolemic.   - encourage water PO  - Consider Nephrology consult  - Monitor BMP Q12-24H   Dysphonia  Paralysis of right vocal cord  -Persistent hoarseness since extubation on 4/8  -ENT consulted, status post laryngoscopy 4/15 which noted right TDC paralysis with mild hemorrhage of true cord, adequate glottic closure noted  -Pt uninterested in VC augmentation at this time, needs outpatient SLP follow up  Typical atrial flutter  -s/p ablation 6/2024 without recurrence  -AC discontinued by outpatient EP provider  -Cont BB  - of note, on Eliquis for VTE (not for Afib)  CKD (chronic kidney disease) stage 3, GFR  "30-59 ml/min  Creatine stable for now. BMP reviewed- noted Estimated Creatinine Clearance: 26.3 mL/min (A) (based on SCr of 1.6 mg/dL (H)). according to latest data. Based on current GFR, CKD stage is stage 4 - GFR 15-29.  Monitor UOP and serial BMP and adjust therapy as needed. Renally dose meds. Avoid nephrotoxic medications and procedures.    Centrilobular emphysema  Prior history of smoking. Continue scheduled inhalers once extubated. Antibiotics and Supplemental oxygen and monitor respiratory status closely.     Chronic respiratory failure with hypoxia  Patient with Hypoxic Respiratory failure which is Chronic.  she is on home oxygen at 2 LPM. Supplemental oxygen was provided and noted-       Signs/symptoms of respiratory failure include- tachypnea, increased work of breathing, use of accessory muscles, and lethargy. Contributing diagnoses includes - underlying COPD   -transition to home bumex    Acute on chronic congestive heart failure  Most recent BNP and echo results are listed below.  No results for input(s): "BNP" in the last 72 hours.     Echo  Result Date: 4/21/2025    Left Ventricle: The left ventricle is normal in size. Normal wall   thickness. There is normal systolic function with a visually estimated   ejection fraction of 55 - 60%.    Right Ventricle: The right ventricle has mild enlargement. Systolic   function is mildly reduced.    Aortic Valve: There is moderate aortic valve sclerosis. There is mild   annular calcification present. Mildly restricted motion.    Mitral Valve: There is severe mitral annular calcification.    Tricuspid Valve: There is mild regurgitation.    Pulmonary Artery: There is moderate pulmonary hypertension. The   estimated pulmonary artery systolic pressure is 51 mmHg.    IVC/SVC: Elevated venous pressure at 15 mmHg.    Left pleural effusion.      Current Heart Failure Medications: have been held due to bradycardia and MARQUEZ     Plan  - HOLD home Bumex for now while tx-ing " "sepsis, resume asap  - HOLD home hydralazine for same reason  - cont home metop for rate control in afib  - Monitor strict I&Os and daily weights.  - Monitor electrolytes, replete PRN for goal K > 4 & Mg > 2  - Telemetry    Orthostatic hypotension  holding midodrine and per history patient had not required it since last hospitalization     Iron deficiency anemia due to chronic blood loss  Anemia is likely due to chronic disease due to Chronic Kidney Disease. Most recent hemoglobin and hematocrit are listed below.  Recent Labs     04/20/25  0411 04/21/25  0520 04/22/25  0450   HGB 11.5* 12.0 11.7*   HCT 37.2 39.9 38.6     Plan  - Monitor serial CBC: Daily  - Transfuse PRBC if patient becomes hemodynamically unstable, symptomatic or H/H drops below 7/21.  - Patient has not received any PRBC transfusions to date  - Patient's anemia is currently stable    Subclinical hypothyroidism  Per Endocrine consult, "Mild, subclinical hypothyroidism - unlikely to be the cause of shock or mental status changes especially given normal fT4. Can start low dose LT4 per OGT, but in cardiac pts and elderly, better to under treat rather than over-treat -- therefore conservative/low dose LT4 recommended. Needs TSH repeated outpatient in 4wks."    Left knee pain  Appreciate Ortho consult 4/21  Cont Lido patch    Cystitis      VTE Risk Mitigation (From admission, onward)           Ordered     apixaban tablet 5 mg  2 times daily        Placed in "Followed by" Linked Group    04/20/25 1900     apixaban tablet 10 mg  2 times daily        Placed in "Followed by" Linked Group    04/20/25 1900     IP VTE HIGH RISK PATIENT  Once         04/06/25 0512     Place sequential compression device  Until discontinued         04/06/25 0454                    Discharge Planning   NOHEMI: 4/24/2025     Code Status: Full Code   Medical Readiness for Discharge Date:   Discharge Plan A: Skilled Nursing Facility   Discharge Delays: (!) Payor Issues            Please " place Justification for DME        Ilene Wu MD  Department of Hospital Medicine   [facility] - Cardiology Stepdown

## 2025-04-22 NOTE — PLAN OF CARE
Problem: Skin Injury Risk Increased  Goal: Skin Health and Integrity  Outcome: Progressing-turned q2h, waffle overlay in place, triad cream BID and prn     Problem: Adult Inpatient Plan of Care  Goal: Plan of Care Review  Outcome: Progressing  Flowsheets (Taken 4/21/2025 1934)  Plan of Care Reviewed With: patient     Problem: Fall Injury Risk  Goal: Absence of Fall and Fall-Related Injury  Outcome: Progressing-bed alarm on at all times. Frequent rounds made t/o shift

## 2025-04-22 NOTE — ASSESSMENT & PLAN NOTE
Possible cystitis in the setting of hypothermia and positive urine culture. Patient denies urinary tract symptoms however daughters at bedside made known that patient under reports symptoms.   Will plan to treat as in hypothermia.

## 2025-04-22 NOTE — PT/OT/SLP PROGRESS
Occupational Therapy  Co Treatment    Name: Misa Barajas  MRN: [id number]  Admitting Diagnosis:  Complete heart block  12 Days Post-Op    Recommendations:     Discharge Recommendations: Moderate Intensity Therapy  Discharge Equipment Recommendations:  walker, rolling      Assessment:     Misa Barajas is a 79 y.o. female with a medical diagnosis of Complete heart block.   Performance deficits affecting function are weakness, impaired endurance, impaired self care skills, impaired functional mobility, gait instability, impaired balance.   Pt tolerated session fairly well. Pt reports fatigue. Pt with few verbalizations.     Rehab Prognosis:  Fair; patient would benefit from acute skilled OT services to address these deficits and reach maximum level of function.       Plan:     Patient to be seen 4 x/week to address the above listed problems via therapeutic activities, therapeutic exercises, self-care/home management  Plan of Care Expires: 05/13/25  Plan of Care Reviewed with: patient    Subjective     Pt agreeable to therapy     Pain/Comfort:  Pain Rating 1: 0/10    Objective:     Communicated with: nsg prior to session.  Patient found in bed with tele, 6 LPM oxygen via NC.   Cotx completed this date to optimize functional performance and safety given impaired tolerance for activity     General Precautions: Standard, fall      Occupational Performance:     Bed Mobility:    Supine>long sitting with SBA  Long sitting to EOB sitting with SBA   Increased time/rest breaks between transitions.     Functional Mobility/Transfers:  2 standing trials with RW and MOD A from bed.   Few steps to chair with MOD A and RW     Activities of Daily Living:  Pt reports staff completed oral care and face washing prior to arrival.   Pt demo UE strength/ROM to complete these tasks with set-up and education provided re: importance of engagement of basic self care skills as independently as able.     LE dressing; TOTAL A   UE dressing;  MAX A     [facility] 6 Click ADL: 11    Treatment & Education:  Pt demo SBA for postural control seated EOB. 2 standing trials with MOD A from EOB with use of RW. Unable to progress with standing ADL's or forward steps due to pt's reports fatigue. Pt did not have SOB with activity, but did request rest breaks throughout all transitions.   Education provided re: role of OT and safety with functional mobility/ADL skills.     Patient left up in chair with all lines intact, call button in reach.      GOALS:   Multidisciplinary Problems       Occupational Therapy Goals          Problem: Occupational Therapy    Goal Priority Disciplines Outcome Interventions   Occupational Therapy Goal     OT, PT/OT Progressing    Description: Goals to be met by: 5/13/2025     Patient will increase functional independence with ADLs by performing:    UE Dressing with Minimal Assistance.  LE Dressing with Moderate Assistance.  Grooming while standing at sink with Contact Guard Assistance.  Toileting from bedside commode with Minimal Assistance for hygiene and clothing management.   Step transfer with Moderate Assistance  Toilet transfer to bedside commode with Moderate Assistance.                         Time Tracking:     OT Date of Treatment: 04/22/25  OT Start Time: 1034  OT Stop Time: 1055  OT Total Time (min): 21 min    Billable Minutes:Therapeutic Activity 21    OT/MARYA: OT     Number of MARYA visits since last OT visit: 0    4/22/2025

## 2025-04-22 NOTE — NURSING
"1934  Sleeping soundly, arousable to voice. Continues to speak in a whisper. Oriented to all but day of week/time of day (& easily reoriented). Denies pain, nausea, and/or SOB. Oxygen sats 96%, decreased to 5L with no change (remains 95-96%).  L knee slightly more edematous than rest of leg, elevated on pillow at this time. Aware of POC for this shift with no questions or concerns voiced. Call light within reach, will continue to monitor.     0605  Uneventful shift. Pt slept soundly t/o. C/o being "a little" SOB after morning care. Oxygen sats 93% on 5L-increased to 6L with verbalization of improvement. Assessment otherwise unchanged, will continue to monitor and report to oncoming shift.   "

## 2025-04-22 NOTE — SUBJECTIVE & OBJECTIVE
Interval History: Pt seen and examined this morning on mag NIELSON.  Hypothermia returned however pt looks and feels the best she has in 4 days.  Voice is getting stronger.  Sitting in chair.  UCx resulted with ESBL Klebsiella, so Sher has been good coverage.  ID changed to Dapto (from Vanc), continuing Sher, for coverage of UTI and well as PNA.  BCx have all been negative.  Continuing Eliquis for DVt LUE and presumed PE.  Acute hypox resp failure, still on 6L today, will dose Lasix 60 IV x 1 and re-eval tomorrow. HyperNa- improving.     Discussed ACP/GOC, pt agreeable to Palliative Care consult to further discuss. Saw Pall Care x2 via virtual visits prior to this admit. Prior to admit, was independent in her residence with her cat.  Two daughters very supportive.     Care plan reviewed with pt and daughter Marina at bedside.  Otherwise, doing well and with no further complaints at this time.     Review of Systems   All other systems reviewed and are negative.    Objective:     Vital Signs (Most Recent):  Temp: 97.4 °F (36.3 °C) (04/22/25 1450)  Pulse: 63 (04/22/25 1600)  Resp: 20 (04/22/25 1450)  BP: (!) 117/59 (04/22/25 1450)  SpO2: 95 % (04/22/25 1450) Vital Signs (24h Range):  Temp:  [91.4 °F (33 °C)-97.5 °F (36.4 °C)] 97.4 °F (36.3 °C)  Pulse:  [54-78] 63  Resp:  [16-20] 20  SpO2:  [93 %-96 %] 95 %  BP: (104-144)/(50-65) 117/59     Weight: 68.8 kg (151 lb 10.8 oz)  Body mass index is 26.04 kg/m².    Intake/Output Summary (Last 24 hours) at 4/22/2025 1635  Last data filed at 4/22/2025 1426  Gross per 24 hour   Intake 727 ml   Output 475 ml   Net 252 ml         Physical Exam  Constitutional:       General: She is not in acute distress.     Appearance: She is not toxic-appearing or diaphoretic.   HENT:      Head: Normocephalic and atraumatic.      Mouth/Throat:      Mouth: Mucous membranes are moist.   Eyes:      General: No scleral icterus.     Conjunctiva/sclera: Conjunctivae normal.   Cardiovascular:      Rate  and Rhythm: Normal rate and regular rhythm.      Heart sounds: Normal heart sounds. No murmur heard.  Pulmonary:      Breath sounds: Examination of the right-upper field reveals decreased breath sounds. Examination of the left-upper field reveals decreased breath sounds. Examination of the left-middle field reveals decreased breath sounds. Decreased breath sounds present. No wheezing, rhonchi or rales.   Abdominal:      General: Abdomen is flat. There is no distension.      Palpations: Abdomen is soft.      Tenderness: There is no abdominal tenderness. There is no guarding or rebound.   Musculoskeletal:         General: No signs of injury.      Right lower leg: Edema (trace to 1+, pitting) present.      Left lower leg: Edema (trace to 1+, pitting) present.   Skin:     General: Skin is warm and dry.   Neurological:      General: No focal deficit present.      Mental Status: She is alert and oriented to person, place, and time. Mental status is at baseline.   Psychiatric:         Mood and Affect: Mood normal.         Behavior: Behavior normal.               Significant Labs: All pertinent labs within the past 24 hours have been reviewed.    Significant Imaging: I have reviewed all pertinent imaging results/findings within the past 24 hours.

## 2025-04-22 NOTE — ASSESSMENT & PLAN NOTE
Anemia is likely due to chronic disease due to Chronic Kidney Disease. Most recent hemoglobin and hematocrit are listed below.  Recent Labs     04/19/25  0823 04/20/25  0411 04/21/25  0520   HGB 11.7* 11.5* 12.0   HCT 37.4 37.2 39.9     Plan  - Monitor serial CBC: Daily  - Transfuse PRBC if patient becomes hemodynamically unstable, symptomatic or H/H drops below 7/21.  - Patient has not received any PRBC transfusions to date  - Patient's anemia is currently stable

## 2025-04-22 NOTE — ASSESSMENT & PLAN NOTE
Creatine stable for now. BMP reviewed- noted Estimated Creatinine Clearance: 26.3 mL/min (A) (based on SCr of 1.6 mg/dL (H)). according to latest data. Based on current GFR, CKD stage is stage 4 - GFR 15-29.  Monitor UOP and serial BMP and adjust therapy as needed. Renally dose meds. Avoid nephrotoxic medications and procedures.

## 2025-04-22 NOTE — ASSESSMENT & PLAN NOTE
"Most recent BNP and echo results are listed below.  No results for input(s): "BNP" in the last 72 hours.     Echo  Result Date: 4/21/2025    Left Ventricle: The left ventricle is normal in size. Normal wall   thickness. There is normal systolic function with a visually estimated   ejection fraction of 55 - 60%.    Right Ventricle: The right ventricle has mild enlargement. Systolic   function is mildly reduced.    Aortic Valve: There is moderate aortic valve sclerosis. There is mild   annular calcification present. Mildly restricted motion.    Mitral Valve: There is severe mitral annular calcification.    Tricuspid Valve: There is mild regurgitation.    Pulmonary Artery: There is moderate pulmonary hypertension. The   estimated pulmonary artery systolic pressure is 51 mmHg.    IVC/SVC: Elevated venous pressure at 15 mmHg.    Left pleural effusion.      Current Heart Failure Medications: have been held due to bradycardia and MARQUEZ     Plan  - HOLD home Bumex for now while tx-ing sepsis, resume asap  - HOLD home hydralazine for same reason  - cont home metop for rate control in afib  - Monitor strict I&Os and daily weights.  - Monitor electrolytes, replete PRN for goal K > 4 & Mg > 2  - Telemetry    "

## 2025-04-23 DIAGNOSIS — I48.3 TYPICAL ATRIAL FLUTTER: Primary | ICD-10-CM

## 2025-04-23 PROBLEM — Z74.09 OTHER REDUCED MOBILITY: Status: ACTIVE | Noted: 2025-04-23

## 2025-04-23 PROBLEM — E43 SEVERE PROTEIN-CALORIE MALNUTRITION: Status: ACTIVE | Noted: 2025-04-23

## 2025-04-23 NOTE — PROGRESS NOTES
[facility] - Cardiology Step[facility]  Adult Nutrition  Progress Note    SUMMARY       Recommendations    Recommendation/Intervention:   1. Continue low Na diet as tolerated      - please continue to document PO % intake via flowsheets     2. Continue boost plus TID   3. Encourage good intake    4. RD to monitor weight, labs, intake, tolerance    Goals:   1. % nutritional needs met with diet during admission     2. Maintain weight during admission  Nutrition Goal Status: progressing towards goal  Communication of RD Recs: other (comment) (POC)    Nutrition Discharge Planning    Nutrition Discharge Planning: Therapeutic diet (comments), Oral supplement regimen (comments)  Therapeutic diet (comments): low Na diet  Oral supplement regimen (comments): supplement of choice    Assessment and Plan    Endocrine  Severe protein-calorie malnutrition  Malnutrition Type:  Context: acute illness or injury  Level: severe    Related to (etiology):   Physiological causes resulting in diminished intake     Signs and Symptoms (as evidenced by):   Significant wt loss and consuming < 50% of meals     Malnutrition Characteristic Summary:  Weight Loss (Malnutrition): greater than 2% in 1 week (10 lbs/6.2% wt loss x 1 week)  Energy Intake (Malnutrition): less than or equal to 50% for greater than or equal to 5 days    Interventions/Recommendations (treatment strategy):  Collaboration with other providers  ONS    Nutrition Diagnosis Status:   New       Nutrition Problem  Inadequate energy intake     Related to (etiology):   Decreased ability to consume sufficient energy     Signs and Symptoms (as evidenced by):   Pt consuming < 75% of meals   Energy needs < intake       Interventions/Recommendations (treatment strategy):  Collaboration with other providers  [facility]     Nutrition Diagnosis Status:   Continues     Malnutrition Assessment    Malnutrition Context: acute illness or injury  Malnutrition Level: severe  Skin (Micronutrient): edema      "  Weight Loss (Malnutrition): greater than 2% in 1 week (10 lbs/6.2% wt loss x 1 week)  Energy Intake (Malnutrition): less than or equal to 50% for greater than or equal to 5 days   Orbital Region (Subcutaneous Fat Loss): well nourished  Upper Arm Region (Subcutaneous Fat Loss): mild depletion  Thoracic and Lumbar Region: well nourished   Buddhism Region (Muscle Loss): mild depletion  Clavicle Bone Region (Muscle Loss): well nourished  Clavicle and Acromion Bone Region (Muscle Loss): mild depletion  Dorsal Hand (Muscle Loss): mild depletion  Patellar Region (Muscle Loss): well nourished  Anterior Thigh Region (Muscle Loss): well nourished  Posterior Calf Region (Muscle Loss): mild depletion     Reason for Assessment    Reason For Assessment: RD follow-up  Diagnosis: cardiac disease (complete heart block)  General Information Comments: Pt admitted with complete heart block. No new surgical hx. Pacemaker present. 1+, 2+, 3+, and 4+ edema. No new wounds. Loose stools yesterday  - fecal incontinence - no GI s/s - BM: . Having a poor intake - refusing meals - PO: 0-25%. 10 lbs/6.2% wt loss x 1 week. NFPE completed , age related wasting.    Nutrition/Diet History    Spiritual, Cultural Beliefs, Buddhism Practices, Values that Affect Care: no  Food Allergies: NKFA  Factors Affecting Nutritional Intake: decreased appetite    Nutrition Related Social Determinants of Health: SDOH: Adequate food in home environment     Anthropometrics    Height: 5' 4" (162.6 cm)  Height (inches): 64 in  Height Method: Estimated  Weight: 68.8 kg (151 lb 10.8 oz)  Weight (lb): 151.68 lb  Weight Method: Bed Scale  Ideal Body Weight (IBW), Female: 120 lb  % Ideal Body Weight, Female (lb): 139.63 %  BMI (Calculated): 26  BMI Grade: 25 - 29.9 - overweight  Usual Body Weight (UBW), k.6 kg  % Usual Body Weight: 115.65    Lab/Procedures/Meds    Pertinent Labs Reviewed: reviewed  Pertinent Labs Comments: H/H 11.0/35.1 low, BUN 44 high, " creatinine 1.5 high, albumin 2.0 low, GFR 35 low, CRP 8.5 high  Pertinent Medications Reviewed: reviewed  Pertinent Medications Comments: apixaban, daptomycin, levothyroxine, meropenem, metoprolol    Estimated/Assessed Needs    Weight Used For Calorie Calculations: 73.6 kg (162 lb 4.1 oz)  Energy Calorie Requirements (kcal): 2621-8711 kcal  Energy Need Method: Kcal/kg (25-35 kcal/kg)  Protein Requirements: 44-59 g/pro (0.6-0.8 g/kg)  Weight Used For Protein Calculations: 73.4 kg (161 lb 13.1 oz)        RDA Method (mL): 1840  CHO Requirement: 230-322 g    Nutrition Prescription Ordered    Current Diet Order: low Na  Oral Nutrition Supplement: boost plus TID    Evaluation of Received Nutrient/Fluid Intake    Energy Calories Required: not meeting needs  Protein Required: not meeting needs  Fluid Required: other (see comments) (per MD)  Tolerance: tolerating  % Intake of Estimated Energy Needs: 0 - 25 %  % Meal Intake: 0 - 25 %    Nutrition Risk    Level of Risk/Frequency of Follow-up: moderate     Monitor and Evaluation    Monitor and Evaluation: Energy intake, Food and beverage intake, Protein intake, Carbohydrate intake, Diet order, Weight, Electrolyte and renal panel, Gastrointestinal profile, Glucose/endocrine profile, Inflammatory profile, Lipid profile, Nutrition focused physical findings     Nutrition Follow-Up    RD Follow-up?: Yes

## 2025-04-23 NOTE — PROGRESS NOTES
[facility] - [facility] Step[facility]  Infectious Disease  Progress Note    Patient Name: Misa Barajas  MRN: [id number]  Admission Date: 4/6/2025  Length of Stay: 17 days  Attending Physician: Ilene Wu MD  Primary Care Provider: Is[facility] Langston MD    Isolation Status: Contact  Assessment/Plan:      Renal/  Cystitis  Possible cystitis in the setting of hypothermia and positive urine culture. Patient denies urinary tract symptoms however daughters at bedside made known that patient under reports symptoms.   Will plan to treat as in hypothermia.     Other  Hypothermia  I have reviewed hospital notes from   service and other specialty providers. I have also reviewed CBC, CMP/BMP,  cultures and imaging with my interpretation as documented.     Recurrent hypothermia of unclear etiology. Hypothermic once again today and without leukocytosis. Urine culture with ESBL K pneumoniae and E faecium. Patient denies urinary tract symptoms however daughters say patient under reports symptoms. Baseline CK 27.  Continue meropenem.  Continue daptomycin.  CK weekly.   Contact isolation precautions.  Agree with tagged WBC scan.  Discussed management plan with the staff from  service.        Anticipated Disposition: per primary    Thank you for your consult. I will follow-up with patient. Please contact us if you have any additional questions.    Melinda Morales MD  Infectious Disease  [facility] - Cardiology Stepdown    50 minutes of total time spent on the encounter, which includes face to face time and non-face to face time preparing to see the patient (eg, review of tests), obtaining and/or reviewing separately obtained history, documenting clinical information in the electronic or other health record, independently interpreting results (not separately reported) and communicating results to the patient/family/caregiver, or care coordination (not separately reported).     Subjective:     Principal Problem:Complete heart  "block    HPI: Ms. Barajas is a 79F with PMH of HFpEF, COPD on 2L, aflutter s/p ablation, CKD3, HTN, HLD, chronic venous insufficiency, admitted for complete heart block requiring temporary pacing, also requiring intubation and pressors, has since been downgraded from ICU. Infectious disease consulted for "Recurrent sepsis with hypothermia, etiology unclear, possible PNA, possible UTUI, ongoing hypothermia requiring jeffrey hugger despite over 24 hrs on Zosyn and Vanc. Repeating BCx, broadening to Vanc/Sher/Dinah for now".     She reports some nausea and L knee pain, no other symptoms. Admission BCX negative, and 4/18 and 4/20 NG. 4/19 UCX with GNR and E faecium. Has nonocclusive thrombi of L IJ and cephalic veins. CXR with pulmonary congestion. L knee XR with small suprapatellar bursa effusion.   Interval History: "OK". Patient with hypothermia of unclear etiology. Urine culture with ESBL Klebsiella and E faecium. Hypothermic once again  without localizing symptoms.     Review of Systems   Constitutional:  Negative for chills, fatigue, fever and unexpected weight change.   HENT:  Negative for ear pain, facial swelling, hearing loss, mouth sores, nosebleeds, rhinorrhea, sinus pressure, sore throat, tinnitus, trouble swallowing and voice change.    Eyes:  Negative for photophobia, pain, redness and visual disturbance.   Respiratory:  Negative for cough, chest tightness, shortness of breath and wheezing.    Cardiovascular:  Negative for chest pain, palpitations and leg swelling.   Gastrointestinal:  Negative for abdominal pain, blood in stool, constipation, diarrhea, nausea and vomiting.   Endocrine: Negative for cold intolerance, heat intolerance, polydipsia, polyphagia and polyuria.   Genitourinary:  Negative for decreased urine volume, dysuria, flank pain, frequency, hematuria, menstrual problem, urgency, vaginal bleeding, vaginal discharge and vaginal pain.   Musculoskeletal:  Negative for arthralgias, back pain, joint " swelling, myalgias and neck pain.   Skin:  Negative for rash.   Allergic/Immunologic: Negative for environmental allergies, food allergies and immunocompromised state.   Neurological:  Negative for dizziness, seizures, syncope, weakness, light-headedness, numbness and headaches.   Hematological:  Negative for adenopathy. Does not bruise/bleed easily.   Psychiatric/Behavioral:  Negative for confusion, hallucinations, self-injury, sleep disturbance and suicidal ideas. The patient is not nervous/anxious.      Objective:     Vital Signs (Most Recent):  Temp: 97.3 °F (36.3 °C) (04/23/25 1156)  Pulse: 68 (04/23/25 1156)  Resp: 19 (04/23/25 1156)  BP: (!) 123/59 (04/23/25 1156)  SpO2: (!) 91 % (04/23/25 1156) Vital Signs (24h Range):  Temp:  [95 °F (35 °C)-98.5 °F (36.9 °C)] 97.3 °F (36.3 °C)  Pulse:  [51-74] 68  Resp:  [18-19] 19  SpO2:  [87 %-97 %] 91 %  BP: (110-126)/(48-61) 123/59     Weight: 68.8 kg (151 lb 10.8 oz)  Body mass index is 26.04 kg/m².    Estimated Creatinine Clearance: 28 mL/min (A) (based on SCr of 1.5 mg/dL (H)).     Physical Exam  Vitals and nursing note reviewed.   Constitutional:       General: She is sleeping.      Appearance: She is well-developed.   HENT:      Head: Normocephalic and atraumatic.      Right Ear: External ear normal.      Left Ear: External ear normal.   Cardiovascular:      Rate and Rhythm: Normal rate and regular rhythm.      Heart sounds: Normal heart sounds. No murmur heard.     No friction rub. No gallop.   Pulmonary:      Effort: Pulmonary effort is normal. No respiratory distress.      Breath sounds: Normal breath sounds. No stridor. No wheezing or rales.   Abdominal:      General: Bowel sounds are normal.   Musculoskeletal:         General: Swelling (upper extremities) present. No tenderness. Normal range of motion.   Skin:     General: Skin is warm and dry.          Significant Labs: CBC:   Recent Labs   Lab 04/22/25  0450 04/23/25  0336   WBC 8.84 9.65   HGB 11.7* 11.0*    HCT 38.6 35.1*    162     CMP:   Recent Labs   Lab 04/22/25  0450 04/23/25  0336    141   K 3.6 3.9   * 110   CO2 28 25   BUN 46* 44*   CREATININE 1.6* 1.5*   CALCIUM 9.6 9.9   ALBUMIN 2.1* 2.0*   BILITOT 0.5 0.5   ALKPHOS 93 93   AST 34 31   ALT 17 14   ANIONGAP 5* 6*     Microbiology Results (last 7 days)       Procedure Component Value Units Date/Time    Culture, Respiratory with Gram Stain [[id number]]     Order Status: Sent Specimen: Respiratory     Urine culture [[id number]]  (Abnormal)  (Susceptibility) Collected: 04/19/25 0121    Order Status: Completed Specimen: Urine Updated: 04/23/25 1406     Urine Culture >100,000 cfu/ml Klebsiella pneumoniae esbl      50,000 - 99,999 cfu/ml Enterococcus faecium VRE    Blood culture [[id number]]  (Normal) Collected: 04/18/25 2005    Order Status: Completed Specimen: Blood from Peripheral, Antecubital, Right Updated: 04/23/25 0007     Blood Culture No Growth After 96 hours    Blood culture [[id number]]  (Normal) Collected: 04/18/25 2006    Order Status: Completed Specimen: Blood from Peripheral, Wrist, Right Updated: 04/23/25 0007     Blood Culture No Growth After 96 hours    Blood culture [[id number]]  (Normal) Collected: 04/20/25 1736    Order Status: Completed Specimen: Blood Updated: 04/22/25 2100     Blood Culture No Growth After 48 Hours    Blood culture [[id number]]  (Normal) Collected: 04/20/25 1739    Order Status: Completed Specimen: Blood Updated: 04/22/25 2100     Blood Culture No Growth After 48 Hours    Culture, Anaerobe [[id number]]     Order Status: Canceled Specimen: Joint Fluid from Knee, Left     Aerobic culture [[id number]]     Order Status: Canceled Specimen: Bone from Knee, Left     Gram stain [[id number]]     Order Status: Canceled Specimen: Joint Fluid from Knee, Left     AFB Culture & Smear [[id number]]     Order Status: Canceled Specimen: Joint Fluid from Knee, Left     Fungus culture [[id number]]     Order Status: Canceled  Specimen: Joint Fluid, Left Knee     Urine culture [[id number]] Collected: 04/19/25 1015    Order Status: Completed Specimen: Urine Updated: 04/21/25 0016     Urine Culture Multiple organisms isolated. None in predominance. Repeat if clinically necessary.    Culture, Respiratory with Gram Stain [[id number]]     Order Status: Sent Specimen: Respiratory             Significant Imaging: I have reviewed all pertinent imaging results/findings within the past 24 hours.

## 2025-04-23 NOTE — ASSESSMENT & PLAN NOTE
I have reviewed hospital notes from   service and other specialty providers. I have also reviewed CBC, CMP/BMP,  cultures and imaging with my interpretation as documented.     Recurrent hypothermia of unclear etiology. Hypothermic once again today and without leukocytosis. Urine culture with ESBL K pneumoniae and E faecium. Patient denies urinary tract symptoms however daughters say patient under reports symptoms. Baseline CK 27.  Continue meropenem.  Continue daptomycin.  CK weekly.   Contact isolation precautions.  Agree with tagged WBC scan.  Discussed management plan with the staff from  service.

## 2025-04-23 NOTE — PROGRESS NOTES
RD decreased Boost to once per day due to dining associate states patient not drinking.    Thanks,    Lavinia Guadalupe RD, LDN

## 2025-04-23 NOTE — ASSESSMENT & PLAN NOTE
I have reviewed hospital notes from   service and other specialty providers. I have also reviewed CBC, CMP/BMP,  cultures and imaging with my interpretation as documented.     Recurrent hypothermia of unclear etiology. Hypothermic once again today and without leukocytosis. Urine culture with ESBL K pneumoniae and E faecium. Patient denies urinary tract symptoms however daughters say patient under reports symptoms. Baseline CK 27. Tagged scan ordered due to persistent hypothermia. Patient now normothermic.  Continue meropenem. Plan for a 7 day course (end date: 4/26)  Continue daptomycin.  CK weekly.   Contact isolation precautions.  Follow up tagged WBC scan.  Discussed management plan with the staff from  service.

## 2025-04-23 NOTE — RESPIRATORY THERAPY
RAPID RESPONSE RESPIRATORY CHART CHECK       Chart check completed for HFO2 compliance. Orders modified to reflect patient's current oxygen requirements.  Please call [phone] for further concerns or assistance.

## 2025-04-23 NOTE — CONSULTS
Palliative Medicine consult acknowledged.     Briefly, Misa Barajas is a 79 y.o. female with a PMHx of COPD on 2L home O2, HFpEF, AFL s/p RFA (6/17/24 w/ Dr. Church), CKD III, HTN, HLD, chronic venous insufficiency, orthostatic hypotension who was brought via EMS after per history she was found confused and bradycardiac with HR in the 20s. Patient was percutaneously paced. During hospitalization, was intubated then extubated with residual hoarseness of voice (ENT following), required intermittent pacing by TVP, a Micra PM by EP placed 04/10, aggressive diuresing, recurrent sepsis and hypothermia, patient has required several trials of antibiotics (ID following), and presumed PE (unable to verify with CTA secondary to MARQUEZ).     Of note, patient has had 7x ED visits in the last year. Patient has engaged in goals of care discussions with Dr. Velasquez of Palliative Medicine for goals of care and symptom management.     Palliative Medicine was consulted by primary team, Dr. Wu, for continued goals of care discussions. Patient's case was discussed with Dr. Wu and ID.     I visited patient this morning. Upon my entry into room, patient appeared to be resting comfortably. Patient awakens with voice and gentle touch. Patient is AAOx4, no acute distress. I introduced myself to patient and re-introduced palliative care to patient. Patient states her daughter has left for the day, and would like to engage in conversation with her daughter present. Patient gave me approval to call patient's daughter to introduce myself and plan to meet patient and daughter at bedside.     I called Esther Barajas and Sirisha Gruber. Tentative family meeting planned on 4/24/25 at 0900 at patient's bedside. Full consult note to follow.             Krystyna Hdz, RAYMOND, APRN, FNP-C  Department of Palliative Medicine  [facility]  [phone]

## 2025-04-23 NOTE — SUBJECTIVE & OBJECTIVE
"Interval History: "OK". Patient with hypothermia of unclear etiology. Urine culture with ESBL Klebsiella and E faecium. Hypothermic once again  without localizing symptoms.     Review of Systems   Constitutional:  Negative for chills, fatigue, fever and unexpected weight change.   HENT:  Negative for ear pain, facial swelling, hearing loss, mouth sores, nosebleeds, rhinorrhea, sinus pressure, sore throat, tinnitus, trouble swallowing and voice change.    Eyes:  Negative for photophobia, pain, redness and visual disturbance.   Respiratory:  Negative for cough, chest tightness, shortness of breath and wheezing.    Cardiovascular:  Negative for chest pain, palpitations and leg swelling.   Gastrointestinal:  Negative for abdominal pain, blood in stool, constipation, diarrhea, nausea and vomiting.   Endocrine: Negative for cold intolerance, heat intolerance, polydipsia, polyphagia and polyuria.   Genitourinary:  Negative for decreased urine volume, dysuria, flank pain, frequency, hematuria, menstrual problem, urgency, vaginal bleeding, vaginal discharge and vaginal pain.   Musculoskeletal:  Negative for arthralgias, back pain, joint swelling, myalgias and neck pain.   Skin:  Negative for rash.   Allergic/Immunologic: Negative for environmental allergies, food allergies and immunocompromised state.   Neurological:  Negative for dizziness, seizures, syncope, weakness, light-headedness, numbness and headaches.   Hematological:  Negative for adenopathy. Does not bruise/bleed easily.   Psychiatric/Behavioral:  Negative for confusion, hallucinations, self-injury, sleep disturbance and suicidal ideas. The patient is not nervous/anxious.      Objective:     Vital Signs (Most Recent):  Temp: 97.3 °F (36.3 °C) (04/23/25 1156)  Pulse: 68 (04/23/25 1156)  Resp: 19 (04/23/25 1156)  BP: (!) 123/59 (04/23/25 1156)  SpO2: (!) 91 % (04/23/25 1156) Vital Signs (24h Range):  Temp:  [95 °F (35 °C)-98.5 °F (36.9 °C)] 97.3 °F (36.3 " °C)  Pulse:  [51-74] 68  Resp:  [18-19] 19  SpO2:  [87 %-97 %] 91 %  BP: (110-126)/(48-61) 123/59     Weight: 68.8 kg (151 lb 10.8 oz)  Body mass index is 26.04 kg/m².    Estimated Creatinine Clearance: 28 mL/min (A) (based on SCr of 1.5 mg/dL (H)).     Physical Exam  Vitals and nursing note reviewed.   Constitutional:       General: She is sleeping.      Appearance: She is well-developed.   HENT:      Head: Normocephalic and atraumatic.      Right Ear: External ear normal.      Left Ear: External ear normal.   Cardiovascular:      Rate and Rhythm: Normal rate and regular rhythm.      Heart sounds: Normal heart sounds. No murmur heard.     No friction rub. No gallop.   Pulmonary:      Effort: Pulmonary effort is normal. No respiratory distress.      Breath sounds: Normal breath sounds. No stridor. No wheezing or rales.   Abdominal:      General: Bowel sounds are normal.   Musculoskeletal:         General: Swelling (upper extremities) present. No tenderness. Normal range of motion.   Skin:     General: Skin is warm and dry.          Significant Labs: CBC:   Recent Labs   Lab 04/22/25  0450 04/23/25  0336   WBC 8.84 9.65   HGB 11.7* 11.0*   HCT 38.6 35.1*    162     CMP:   Recent Labs   Lab 04/22/25  0450 04/23/25  0336    141   K 3.6 3.9   * 110   CO2 28 25   BUN 46* 44*   CREATININE 1.6* 1.5*   CALCIUM 9.6 9.9   ALBUMIN 2.1* 2.0*   BILITOT 0.5 0.5   ALKPHOS 93 93   AST 34 31   ALT 17 14   ANIONGAP 5* 6*     Microbiology Results (last 7 days)       Procedure Component Value Units Date/Time    Culture, Respiratory with Gram Stain [[id number]]     Order Status: Sent Specimen: Respiratory     Urine culture [[id number]]  (Abnormal)  (Susceptibility) Collected: 04/19/25 0121    Order Status: Completed Specimen: Urine Updated: 04/23/25 1406     Urine Culture >100,000 cfu/ml Klebsiella pneumoniae esbl      50,000 - 99,999 cfu/ml Enterococcus faecium VRE    Blood culture [[id number]]  (Normal) Collected:  04/18/25 2005    Order Status: Completed Specimen: Blood from Peripheral, Antecubital, Right Updated: 04/23/25 0007     Blood Culture No Growth After 96 hours    Blood culture [[id number]]  (Normal) Collected: 04/18/25 2006    Order Status: Completed Specimen: Blood from Peripheral, Wrist, Right Updated: 04/23/25 0007     Blood Culture No Growth After 96 hours    Blood culture [[id number]]  (Normal) Collected: 04/20/25 1736    Order Status: Completed Specimen: Blood Updated: 04/22/25 2100     Blood Culture No Growth After 48 Hours    Blood culture [[id number]]  (Normal) Collected: 04/20/25 1739    Order Status: Completed Specimen: Blood Updated: 04/22/25 2100     Blood Culture No Growth After 48 Hours    Culture, Anaerobe [[id number]]     Order Status: Canceled Specimen: Joint Fluid from Knee, Left     Aerobic culture [[id number]]     Order Status: Canceled Specimen: Bone from Knee, Left     Gram stain [[id number]]     Order Status: Canceled Specimen: Joint Fluid from Knee, Left     AFB Culture & Smear [[id number]]     Order Status: Canceled Specimen: Joint Fluid from Knee, Left     Fungus culture [[id number]]     Order Status: Canceled Specimen: Joint Fluid, Left Knee     Urine culture [[id number]] Collected: 04/19/25 1015    Order Status: Completed Specimen: Urine Updated: 04/21/25 0016     Urine Culture Multiple organisms isolated. None in predominance. Repeat if clinically necessary.    Culture, Respiratory with Gram Stain [[id number]]     Order Status: Sent Specimen: Respiratory             Significant Imaging: I have reviewed all pertinent imaging results/findings within the past 24 hours.

## 2025-04-23 NOTE — CARE UPDATE
I have reviewed the chart of Misa Barajas who is hospitalized for the following:    Active Hospital Problems    Diagnosis    *Complete heart block    Other reduced mobility     PT OT consulted, recommending moderate intensity therapy       Cystitis    Left knee pain    Hypothermia    Acute pulmonary embolism    Acute deep vein thrombosis (DVT) of left upper extremity    Left chest pressure    Sepsis    Hypernatremia    Dysphonia    Paralysis of right vocal cord    Shock    Subclinical hypothyroidism    Pulmonary hypertension     ECHO 4/21 with PASP 51 in setting of elevated right sided filling pressures (IVC/CVP 15) with pleural effusion noted , unclear WHO group as presumed PE, hx of CHF and chronic respiratory failure   Given IV diuretics      Iron deficiency anemia due to chronic blood loss    Typical atrial flutter    Orthostatic hypotension    Acute on chronic congestive heart failure    Anticoagulated     Eliquis for DVT LUE and presumed PE       Chronic respiratory failure with hypoxia     POA  Home O2- 2L      Pleural effusion     ECHO with left pleural effusion   Given diuretics   Continue to monitor O2      Centrilobular emphysema    CKD (chronic kidney disease) stage 3, GFR 30-59 ml/min    RBBB        Tyra Corey PA-C  [facility]

## 2025-04-23 NOTE — PLAN OF CARE
Problem: Skin Injury Risk Increased  Goal: Skin Health and Integrity  Outcome: Progressing     Problem: Adult Inpatient Plan of Care  Goal: Plan of Care Review  Outcome: Progressing  Goal: Patient-Specific Goal (Individualized)  Outcome: Progressing  Goal: Absence of Hospital-Acquired Illness or Injury  Outcome: Progressing  Goal: Optimal Comfort and Wellbeing  Outcome: Progressing  Goal: Readiness for Transition of Care  Outcome: Progressing     Problem: Fall Injury Risk  Goal: Absence of Fall and Fall-Related Injury  Outcome: Progressing     Problem: Infection  Goal: Absence of Infection Signs and Symptoms  Outcome: Progressing     Problem: Wound  Goal: Optimal Coping  Outcome: Progressing  Goal: Optimal Functional Ability  Outcome: Progressing  Goal: Absence of Infection Signs and Symptoms  Outcome: Progressing  Goal: Improved Oral Intake  Outcome: Progressing  Goal: Optimal Pain Control and Function  Outcome: Progressing  Goal: Skin Health and Integrity  Outcome: Progressing  Goal: Optimal Wound Healing  Outcome: Progressing     Problem: Acute Kidney Injury/Impairment  Goal: Fluid and Electrolyte Balance  Outcome: Progressing  Goal: Improved Oral Intake  Outcome: Progressing  Goal: Effective Renal Function  Outcome: Progressing     Problem: Delirium  Goal: Optimal Coping  Outcome: Progressing  Goal: Improved Behavioral Control  Outcome: Progressing  Goal: Improved Attention and Thought Clarity  Outcome: Progressing  Goal: Improved Sleep  Outcome: Progressing     Problem: Sepsis/Septic Shock  Goal: Optimal Coping  Outcome: Progressing  Goal: Absence of Bleeding  Outcome: Progressing  Goal: Blood Glucose Level Within Targeted Range  Outcome: Progressing  Goal: Absence of Infection Signs and Symptoms  Outcome: Progressing  Goal: Optimal Nutrition Intake  Outcome: Progressing     Problem: Coping Ineffective  Goal: Effective Coping  Outcome: Progressing

## 2025-04-23 NOTE — PROGRESS NOTES
[facility] Medicine  Progress Note    Patient Name: Misa Barajas  MRN: [id number]  Patient Class: IP- Inpatient   Admission Date: 4/6/2025  Length of Stay: 17 days  Attending Physician: Ilene Wu MD  Primary Care Provider: Isela Langston MD        Subjective     Principal Problem:Complete heart block        HPI:  Ms. Misa Barajas is a 80 yo female with COPD on 2L home O2, HFpEF, AFL s/p RFA (6/17/24 w/ Dr. Church), CKD III, HTN, HLD, chronic venous insufficiency, orthostatic hypotension who was brought via EMS after per history she was found confused and bradycardiac with HR in the 20s.They started epi and transcutaneous pacing. Given Versed for the transcutaneous pacing. They had to bag her in route. We do not have strip of underlying rhythm. Patient obtunded for which she was intubated for airway protection on arrival to the ED and transcutaneous pacing was exchanged to transvenous pacing via RIJ (threshold 0.6). Underlying rate 44 bifascicular block. Known RBBB. Per daughters patient had called them over the phone when they noticed she appeared confused. Due to concerns for a possible syncopal episode EMS was called. Prior to event patient had been complaining of malaise and nausea for which she took about two Zofran pills per the daughters.      She had been recently seen on 3/27/25 at the [facility] visit following recent admission for AHRF 2/2 ADHF and COPD exacerbation. On ED presentation BNP elevated to 2500, troponin elevated to 19, CXR with pulmonary edema. She was started on steroids, nebs and antibiotics in addition to IV diuresis. Updated TTE stable from prior, showing HFpEF with elevated CVP 15. Her O2 requirements improved to baseline with diuresis and she was subsequently discharged. At that visit patient had not required midodrine as blood pressure had remained stable and she was asymptomatic.      Off note, patient was taken off OAC due to bleeding issues  "and no recurrent of typical atrial flutter after ablation.     Overview/Hospital Course:  Admitted to CCU for CHB in s/o hyperkalemia & in the presence of conduction disease/RBBB & LAHB - stable w/ temporary wire. ??Septic shock deemed to be d/t UTI - ?? UA with only 7 WBCs); vasopressors weaned off. HTN; started NG gtt. Acute on chronic hypoxic/hypercapnic RF & obtunded; intubated. Successfully extubated 04/08. MARQUEZ on CKD; improved w/ good UOP response to diuresis. PVCs w/ intermittent pacing by TVP. Switched pip-tazo to amox-clav on 04/09; continued to improve; empiric EED 04/13 for 7 day course. Micra PM by EP placed 04/10. Medically stable for stepdown. Patient was extremely short of breath with physical therapy, chest x-ray concerning for pulmonary edema.  Started patient on IV diuresis, now euvolemic.  ENT was consulted yesterday due to hoarseness of voice.  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 Pt uninterested in any procedure at this time, ENT recommended SLP follow up.  Able to tolerate regular diet at this point.  Now agreeable for SNF, working on placement. Course c/b worsening hypoxia & hyperNa.      Overnight 4/18-4/19, decompensated with recurrent sepsis, hypothermia 92, possible PNA with infiltrates on CXR, Zosyn IV started. Remained hypothermic despite Zosyn x 2 doses and Manny hugger in place (pt uncomfortable/hot and asking for removal), so added Vanc IV STAT. Checked TSH and FT4-  wnl. UA with pyuria however not a clean catch w 33 sq epi, so repeated UA. C/o "stomach" pain, overall picture with the "stomach" pain and septic picture is "exactly what led us to ICU 2 weeks ago" per daughter. +TTP RUQ, RLQ, LLQ.   Poor UOP, only 200cc by mid-shift, and still hyperNa with FWD 1.8L so started gentle hydration with D5W @ 75cc for 24 hrs. May need to diurese again after sepsis resolves. CT C/A/P noncon with pulm edema, B pleural effusions, possible PNA, no " intra-abdominal process seen.     On 4/21, Mental status much improved, however still with mild confusion and feels very tired.   decompensated further with recurrent MARQUEZ, C/o new SOB and hypoxemic 90% on 4L (over her baseline of 2L at home), improved to >94% on 6L with improvement of SOB.  C/o new L chest pressure/pain 4/10 (non-radiating), c/f MI or PE, resolved after nitro SL x 1 however HypoT with SBP upper 80s so will avoid further nitro.  HS trop 38 (decreased from prior), BNP 700s (increased from prior).CXR with pulm edema, all most c/w ADHF- Lasix 40 IV x 1 given.    Concern for untreated sepsis with ongoing hypothermia requiring jeffrey hugger despite Vanc/Zosyn for over 24 hours, so broadened to Vanc/Sher/Dinah, consulted ID, repeated BCx x 2. Lactate wnl.  Re: concern for PE, Known LIJ DVT, apixaban x several days then stopped (likely for hemorrhage of vocal fold)- discussed w ENT 4/20, ok to resume anticoag if needed. PE workup- F/u STAT UE and LE Dopplers. Unable to check CTA chest given MARQUEZ, unable to check V/Q given pulm edema. Anticoagulation with Apixaban.  Hypothermia returned however pt looks and feels the best she has in 4 days.  Voice is getting stronger.  Sitting in chair.  UCx resulted with ESBL Klebsiella, so Sher has been good coverage.  ID changed to Dapto (from Vanc), continuing Sher, for coverage of UTI and well as PNA.  BCx have all been negative.  Continuing Eliquis for DVt LUE and presumed PE.  Acute hypox resp failure 2/2 ADHF and presumed PE, still on 6L, dosing Lasix IV PRN daily given yet-unclear status of her sepsis and avoiding volume depletion in sepsis.      Prior to admit, was independent in her residence with her cat.  Two daughters very supportive.        Interval History: Pt seen and examined this morning on rounds. Hypothermia ongoing this morning, required Jeffrey hugger.  Resolved in afternoon.  If hypothermia returns overnight, will repeat BCx x 2.  Attempting sputum sample  for Cx as well, not optimistic for any yield. NM Full body tagged WBC scan per ID recs- will get injection tomorrow 4/24 then scan on 4/25.     Re-dosed Lasix at higher dose 80mg IV x 1, re-eval again tomorrow.     Pall care consulted, NOT for hospice, NOT due to a downward clinical trajectory, as pt shows signs of clinical improvement except for the hypothermia -- consult is rather for continuity and continuation of ACP/GOC started as outpt prior to admission.      Care plan reviewed. Otherwise, doing well and with no further complaints at this time.     Review of Systems   All other systems reviewed and are negative.    Objective:     Vital Signs (Most Recent):  Temp: 97.4 °F (36.3 °C) (04/23/25 0817)  Pulse: 74 (04/23/25 0848)  Resp: 19 (04/23/25 0817)  BP: (!) 110/56 (04/23/25 0848)  SpO2: (!) 87 % (04/23/25 0817) Vital Signs (24h Range):  Temp:  [94.4 °F (34.7 °C)-98.5 °F (36.9 °C)] 97.4 °F (36.3 °C)  Pulse:  [51-74] 74  Resp:  [18-20] 19  SpO2:  [87 %-97 %] 87 %  BP: (104-126)/(48-61) 110/56     Weight: 68.8 kg (151 lb 10.8 oz)  Body mass index is 26.04 kg/m².    Intake/Output Summary (Last 24 hours) at 4/23/2025 1118  Last data filed at 4/23/2025 1106  Gross per 24 hour   Intake 445 ml   Output 700 ml   Net -255 ml         Physical Exam  Constitutional:       General: She is not in acute distress.     Appearance: She is not toxic-appearing or diaphoretic.   HENT:      Head: Normocephalic and atraumatic.      Mouth/Throat:      Mouth: Mucous membranes are moist.   Eyes:      General: No scleral icterus.     Conjunctiva/sclera: Conjunctivae normal.   Cardiovascular:      Rate and Rhythm: Normal rate and regular rhythm.      Heart sounds: Normal heart sounds. No murmur heard.  Pulmonary:      Breath sounds: Examination of the right-upper field reveals decreased breath sounds. Examination of the left-upper field reveals decreased breath sounds. Examination of the left-middle field reveals decreased breath sounds.  Decreased breath sounds present. No wheezing, rhonchi or rales.   Abdominal:      General: Abdomen is flat. There is no distension.      Palpations: Abdomen is soft.      Tenderness: There is no abdominal tenderness. There is no guarding or rebound.   Musculoskeletal:         General: No signs of injury.      Right lower leg: Edema (trace to 1+, pitting) present.      Left lower leg: Edema (trace to 1+, pitting) present.   Skin:     General: Skin is warm and dry.   Neurological:      General: No focal deficit present.      Mental Status: She is alert and oriented to person, place, and time. Mental status is at baseline.   Psychiatric:         Mood and Affect: Mood normal.         Behavior: Behavior normal.               Significant Labs: All pertinent labs within the past 24 hours have been reviewed.    Significant Imaging: I have reviewed all pertinent imaging results/findings within the past 24 hours.      Assessment & Plan  Complete heart block  RBBB  Shock  -Known RBBB. Admitted with CHB and shock.  Bradycardic with HR in the 20s on arrival, transcutaneous pacing/epi initiated, admitted to CCU  - bradycardia most likely in the setting of electrolyte disturbance and abnormal thyroid function  Plan:  -s/p micra PM placement 4/10 by EP  -correct electrolyte derangements  Sepsis  Hypothermia  Cont Sher/Dapto for now  - f/u NM tagged WBC scan whole body   - f/u BCx and UCx  - f/u ID recs  Acute pulmonary embolism  Left chest pressure  Acute deep vein thrombosis (DVT) of left upper extremity  PRESUMED PE (unable to do imaging at this time as risk > benefit)  CONFIRMED LUE DVT  - Apixaban 10 BID x 7 days then 5 BID  - TTE with mild R heart strain  Hypernatremia  Very slowly uptrending Na since admission. Peak 150 on 4/17 for which pt received 250cc LR. Na 149 the following day & pt w/ increased O2 needs & LE edema. Unclear etiology as pt does not appear hypovolemic.   - encourage water PO  - Consider Nephrology  "consult  - Monitor BMP Q12-24H   Dysphonia  Paralysis of right vocal cord  -Persistent hoarseness since extubation on 4/8  -ENT consulted, status post laryngoscopy 4/15 which noted right TDC paralysis with mild hemorrhage of true cord, adequate glottic closure noted  -Pt uninterested in VC augmentation at this time, needs outpatient SLP follow up  Typical atrial flutter  -s/p ablation 6/2024 without recurrence  -AC discontinued by outpatient EP provider  -Cont BB  - of note, on Eliquis for VTE (not for Afib)  CKD (chronic kidney disease) stage 3, GFR 30-59 ml/min  Creatine stable for now. BMP reviewed- noted Estimated Creatinine Clearance: 28 mL/min (A) (based on SCr of 1.5 mg/dL (H)). according to latest data. Based on current GFR, CKD stage is stage 4 - GFR 15-29.  Monitor UOP and serial BMP and adjust therapy as needed. Renally dose meds. Avoid nephrotoxic medications and procedures.    Centrilobular emphysema  Prior history of smoking. Continue scheduled inhalers once extubated. Antibiotics and Supplemental oxygen and monitor respiratory status closely.     Chronic respiratory failure with hypoxia  Patient with Hypoxic Respiratory failure which is Chronic.  she is on home oxygen at 2 LPM. Supplemental oxygen was provided and noted-       Signs/symptoms of respiratory failure include- tachypnea, increased work of breathing, use of accessory muscles, and lethargy. Contributing diagnoses includes - underlying COPD   -transition to home bumex    Acute on chronic congestive heart failure  Most recent BNP and echo results are listed below.  No results for input(s): "BNP" in the last 72 hours.     Echo  Result Date: 4/21/2025    Left Ventricle: The left ventricle is normal in size. Normal wall   thickness. There is normal systolic function with a visually estimated   ejection fraction of 55 - 60%.    Right Ventricle: The right ventricle has mild enlargement. Systolic   function is mildly reduced.    Aortic Valve: There " "is moderate aortic valve sclerosis. There is mild   annular calcification present. Mildly restricted motion.    Mitral Valve: There is severe mitral annular calcification.    Tricuspid Valve: There is mild regurgitation.    Pulmonary Artery: There is moderate pulmonary hypertension. The   estimated pulmonary artery systolic pressure is 51 mmHg.    IVC/SVC: Elevated venous pressure at 15 mmHg.    Left pleural effusion.      Current Heart Failure Medications: have been held due to bradycardia and MARQUEZ     Plan  - Lasix 80 IV x 1, re-dose daily after clinical eval, slow diuresis while still actively in sepsis  - HOLD home hydralazine for same reason  - cont home metop for rate control in afib  - Monitor strict I&Os and daily weights.  - Monitor electrolytes, replete PRN for goal K > 4 & Mg > 2  - Telemetry    Orthostatic hypotension  holding midodrine and per history patient had not required it since last hospitalization     Iron deficiency anemia due to chronic blood loss  Anemia is likely due to chronic disease due to Chronic Kidney Disease. Most recent hemoglobin and hematocrit are listed below.  Recent Labs     04/21/25  0520 04/22/25  0450 04/23/25  0336   HGB 12.0 11.7* 11.0*   HCT 39.9 38.6 35.1*     Plan  - Monitor serial CBC: Daily  - Transfuse PRBC if patient becomes hemodynamically unstable, symptomatic or H/H drops below 7/21.  - Patient has not received any PRBC transfusions to date  - Patient's anemia is currently stable    Subclinical hypothyroidism  Per Endocrine consult, "Mild, subclinical hypothyroidism - unlikely to be the cause of shock or mental status changes especially given normal fT4. Can start low dose LT4 per OGT, but in cardiac pts and elderly, better to under treat rather than over-treat -- therefore conservative/low dose LT4 recommended. Needs TSH repeated outpatient in 4wks."    Left knee pain  Appreciate Ortho consult 4/21  Cont Lido patch    Cystitis      Pleural " "effusion      Anticoagulated      Pulmonary hypertension      Other reduced mobility      Severe protein-calorie malnutrition  Interventions/Recommendations (treatment strategy):  1. Continue low Na diet as tolerated    - please continue to document PO % intake via flowsheets   2. Continue boost plus TID 3. Encourage good intake  4. RD to monitor weight, labs, intake, tolerance    VTE Risk Mitigation (From admission, onward)           Ordered     apixaban tablet 5 mg  2 times daily        Placed in "Followed by" Linked Group    04/20/25 1900     apixaban tablet 10 mg  2 times daily        Placed in "Followed by" Linked Group    04/20/25 1900     IP VTE HIGH RISK PATIENT  Once         04/06/25 0512     Place sequential compression device  Until discontinued         04/06/25 0454                    Discharge Planning   NOHEMI: 4/29/2025     Code Status: Full Code   Medical Readiness for Discharge Date:   Discharge Plan A: Skilled Nursing Facility   Discharge Delays: (!) Payor Issues        Ilene Wu MD  Department of Hospital Medicine   [facility] Step[facility]    "

## 2025-04-23 NOTE — NURSING
"1940  Assessment completed, see flowsheets. Remains oriented to everything but day/date (knows it's April). Currently denies pain & nausea. C/o being "a little" short of breath and nose bleed. R nares with small amount of blood oozing. Oxygen already humidified with saturation >90% on 6L.  Saline nasal spray ordered and on call MD informed (monitor for now). Refused dinner, no appetite. LUE noted to be more edematous than yesterday. Elevated on pillows. Assisted to turn/reposition, will continue to monitor.     2220 Noted heart rate down to 50 (adeola in the past but usually higher 50's) and now 100% V-paced. On call notified, STAT EKG ordered. Pt sleeping quietly.    0203  STAT EKG never completed. Pt no longer adeola down to 50. Currently SB @ 58 with no paced beats. If adeola's down again will call for EKG. Pt turned/repositioned, will continue to monitor.     0352  upon assessment pt had removed all blankets. Temp 95 axillary. Turned/repositioned, jeffrey hugger and blankets reapplied.  Oxygen decreased slowly to 4L with sat of 95%. R nares no longer bleeding. Tele SB w/1st degree AVB, will continue to monitor.     0634  Temp improved with Jeffrey hugger-last reading 97.1 axillary. Pt returned to sleep after morning meds and care. Continues to sleep soundly at this time. No sign of pain or acute distress noted. Will continue to  monitor and report to oncoming shift.   "

## 2025-04-23 NOTE — HPI
"As per H&P, "Misa Barajas is a 79 y.o. female COPD on 2L home O2, HFpEF, AFL s/p RFA (6/17/24 w/ Dr. Church), CKD III, HTN, HLD, chronic venous insufficiency, orthostatic hypotension who was brought via EMS after per history she was found confused and bradycardiac with HR in the 20s.They started epi and transcutaneous pacing. Given Versed for the transcutaneous pacing. They had to bag her in route. We do not have strip of underlying rhythm. Patient obtunded for which she was intubated for airway protection on arrival to the ED and transcutaneous pacing was exchanged to transvenous pacing via RIJ (threshold 0.6). Underlying rate 44 bifascicular block. Known RBBB. Per daughters patient had called them over the phone when they noticed she appeared confused. Due to concerns for a possible syncopal episode EMS was called. Prior to event patient had been complaining of malaise and nausea for which she took about two Zofran pills per the daughters.      She had been recently seen on 3/27/25 at the [facility] visit following recent admission for AHRF 2/2 ADHF and COPD exacerbation. On ED presentation BNP elevated to 2500, troponin elevated to 19, CXR with pulmonary edema. She was started on steroids, nebs and antibiotics in addition to IV diuresis. Updated TTE stable from prior, showing HFpEF with elevated CVP 15. Her O2 requirements improved to baseline with diuresis and she was subsequently discharged. At that visit patient had not required midodrine as blood pressure had remained stable and she was asymptomatic.      Off note, patient was taken off OAC due to bleeding issues and no recurrent of typical atrial flutter after ablation.      Overview/Hospital Course:  Admitted to CCU for CHB in s/o hyperkalemia & in the presence of conduction disease/RBBB & LAHB - stable w/ temporary wire. ??Septic shock deemed to be d/t UTI - ?? UA with only 7 WBCs); vasopressors weaned off. HTN; started NG gtt. Acute on chronic " "hypoxic/hypercapnic RF & obtunded; intubated. Successfully extubated 04/08. MARQUEZ on CKD; improved w/ good UOP response to diuresis. PVCs w/ intermittent pacing by TVP. Switched pip-tazo to amox-clav on 04/09; continued to improve; empiric EED 04/13 for 7 day course. Micra PM by EP placed 04/10. Medically stable for stepdown. Patient was extremely short of breath with physical therapy, chest x-ray concerning for pulmonary edema.  Started patient on IV diuresis, now euvolemic.  ENT was consulted yesterday due to hoarseness of voice.  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 Pt uninterested in any procedure at this time, ENT recommended SLP follow up.  Able to tolerate regular diet at this point.  Now agreeable for SNF, working on placement. Course c/b worsening hypoxia & hyperNa.       Overnight 4/18-4/19, decompensated with recurrent sepsis, hypothermia 92, possible PNA with infiltrates on CXR, Zosyn IV started. Remained hypothermic despite Zosyn x 2 doses and Manny hugger in place (pt uncomfortable/hot and asking for removal), so added Vanc IV STAT. Checked TSH and FT4-  wnl. UA with pyuria however not a clean catch w 33 sq epi, so repeated UA. C/o "stomach" pain, overall picture with the "stomach" pain and septic picture is "exactly what led us to ICU 2 weeks ago" per daughter. +TTP RUQ, RLQ, LLQ.   Poor UOP, only 200cc by mid-shift, and still hyperNa with FWD 1.8L so started gentle hydration with D5W @ 75cc for 24 hrs. May need to diurese again after sepsis resolves. CT C/A/P noncon with pulm edema, B pleural effusions, possible PNA, no intra-abdominal process seen.      On 4/21, Mental status much improved, however still with mild confusion and feels very tired.   decompensated further with recurrent MARQUEZ, C/o new SOB and hypoxemic 90% on 4L (over her baseline of 2L at home), improved to >94% on 6L with improvement of SOB.  C/o new L chest pressure/pain 4/10 " "(non-radiating), c/f MI or PE, resolved after nitro SL x 1 however HypoT with SBP upper 80s so will avoid further nitro.  HS trop 38 (decreased from prior), BNP 700s (increased from prior).CXR with pulm edema, all most c/w ADHF- Lasix 40 IV x 1 given.    Concern for untreated sepsis with ongoing hypothermia requiring jeffrey hugger despite Vanc/Zosyn for over 24 hours, so broadened to Vanc/Sher/Dinah, consulted ID, repeated BCx x 2. Lactate wnl.  Re: concern for PE, Known LIJ DVT, apixaban x several days then stopped (likely for hemorrhage of vocal fold)- discussed w ENT 4/20, ok to resume anticoag if needed. PE workup- F/u STAT UE and LE Dopplers. Unable to check CTA chest given MARQUEZ, unable to check V/Q given pulm edema. Anticoagulation with Apixaban.  Hypothermia returned however pt looks and feels the best she has in 4 days.  Voice is getting stronger.  Sitting in chair.  UCx resulted with ESBL Klebsiella, so Sher has been good coverage.  ID changed to Dapto (from Vanc), continuing Sher, for coverage of UTI and well as PNA.  BCx have all been negative.  Continuing Eliquis for DVt LUE and presumed PE.  Acute hypox resp failure 2/2 ADHF and presumed PE, still on 6L, dosing Lasix IV PRN daily given yet-unclear status of her sepsis and avoiding volume depletion in sepsis.      Prior to admit, was independent in her residence with her cat.  Two daughters very supportive.         Interval History: Pt seen and examined this morning on rounds. Hypothermia ongoing this morning, required Jeffrey hugger.  Resolved in afternoon.  If hypothermia returns overnight, will repeat BCx x 2.  Attempting sputum sample for Cx as well, not optimistic for any yield. NM Full body tagged WBC scan per ID recs- will get injection tomorrow 4/24 then scan on 4/25.      Re-dosed Lasix at higher dose 80mg IV x 1, re-eval again tomorrow.            Care plan reviewed. Otherwise, doing well and with no further complaints at this time."      "

## 2025-04-23 NOTE — PLAN OF CARE
Problem: Adult Inpatient Plan of Care  Goal: Optimal Comfort and Wellbeing  Outcome: Progressing     Problem: Fall Injury Risk  Goal: Absence of Fall and Fall-Related Injury  Outcome: Progressing  Intervention: Identify and Manage Contributors  Flowsheets (Taken 4/22/2025 1938)  Self-Care Promotion:   independence encouraged   meal set-up provided     Problem: Wound  Goal: Optimal Functional Ability  Outcome: Progressing-Traid cream applied BID and PRN. Pt turned q2h  Goal: Improved Oral Intake  Outcome: Progressing-PO intake encouraged

## 2025-04-23 NOTE — SUBJECTIVE & OBJECTIVE
Interval History: Pt seen and examined this morning on rounds. Hypothermia ongoing this morning, required Manny hugger.  Resolved in afternoon.  If hypothermia returns overnight, will repeat BCx x 2.  Attempting sputum sample for Cx as well, not optimistic for any yield. NM Full body tagged WBC scan per ID recs- will get injection tomorrow 4/24 then scan on 4/25.     Re-dosed Lasix at higher dose 80mg IV x 1, re-eval again tomorrow.     Pall care consulted, NOT for hospice, NOT due to a downward clinical trajectory, as pt shows signs of clinical improvement except for the hypothermia -- consult is rather for continuity and continuation of ACP/GOC started as outpt prior to admission.      Care plan reviewed. Otherwise, doing well and with no further complaints at this time.     Review of Systems   All other systems reviewed and are negative.    Objective:     Vital Signs (Most Recent):  Temp: 97.4 °F (36.3 °C) (04/23/25 0817)  Pulse: 74 (04/23/25 0848)  Resp: 19 (04/23/25 0817)  BP: (!) 110/56 (04/23/25 0848)  SpO2: (!) 87 % (04/23/25 0817) Vital Signs (24h Range):  Temp:  [94.4 °F (34.7 °C)-98.5 °F (36.9 °C)] 97.4 °F (36.3 °C)  Pulse:  [51-74] 74  Resp:  [18-20] 19  SpO2:  [87 %-97 %] 87 %  BP: (104-126)/(48-61) 110/56     Weight: 68.8 kg (151 lb 10.8 oz)  Body mass index is 26.04 kg/m².    Intake/Output Summary (Last 24 hours) at 4/23/2025 1118  Last data filed at 4/23/2025 1106  Gross per 24 hour   Intake 445 ml   Output 700 ml   Net -255 ml         Physical Exam  Constitutional:       General: She is not in acute distress.     Appearance: She is not toxic-appearing or diaphoretic.   HENT:      Head: Normocephalic and atraumatic.      Mouth/Throat:      Mouth: Mucous membranes are moist.   Eyes:      General: No scleral icterus.     Conjunctiva/sclera: Conjunctivae normal.   Cardiovascular:      Rate and Rhythm: Normal rate and regular rhythm.      Heart sounds: Normal heart sounds. No murmur heard.  Pulmonary:       Breath sounds: Examination of the right-upper field reveals decreased breath sounds. Examination of the left-upper field reveals decreased breath sounds. Examination of the left-middle field reveals decreased breath sounds. Decreased breath sounds present. No wheezing, rhonchi or rales.   Abdominal:      General: Abdomen is flat. There is no distension.      Palpations: Abdomen is soft.      Tenderness: There is no abdominal tenderness. There is no guarding or rebound.   Musculoskeletal:         General: No signs of injury.      Right lower leg: Edema (trace to 1+, pitting) present.      Left lower leg: Edema (trace to 1+, pitting) present.   Skin:     General: Skin is warm and dry.   Neurological:      General: No focal deficit present.      Mental Status: She is alert and oriented to person, place, and time. Mental status is at baseline.   Psychiatric:         Mood and Affect: Mood normal.         Behavior: Behavior normal.               Significant Labs: All pertinent labs within the past 24 hours have been reviewed.    Significant Imaging: I have reviewed all pertinent imaging results/findings within the past 24 hours.

## 2025-04-23 NOTE — ASSESSMENT & PLAN NOTE
Malnutrition Type:  Context: acute illness or injury  Level: severe    Related to (etiology):   Physiological causes resulting in diminished intake     Signs and Symptoms (as evidenced by):   Significant wt loss and consuming < 50% of meals     Malnutrition Characteristic Summary:  Weight Loss (Malnutrition): greater than 2% in 1 week (10 lbs/6.2% wt loss x 1 week)  Energy Intake (Malnutrition): less than or equal to 50% for greater than or equal to 5 days    Interventions/Recommendations (treatment strategy):  Collaboration with other providers  [facility]    Nutrition Diagnosis Status:   New

## 2025-04-23 NOTE — PLAN OF CARE
Recommendations     Recommendation/Intervention:   1. Continue low Na diet as tolerated      - please continue to document PO % intake via flowsheets     2. Continue boost plus TID   3. Encourage good intake    4. RD to monitor weight, labs, intake, tolerance     Goals:   1. % nutritional needs met with diet during admission     2. Maintain weight during admission  Nutrition Goal Status: progressing towards goal  Communication of RD Recs: other (comment) (POC)     Nutrition Discharge Planning     Nutrition Discharge Planning: Therapeutic diet (comments), Oral supplement regimen (comments)  Therapeutic diet (comments): low Na diet  Oral supplement regimen (comments): supplement of choice

## 2025-04-24 PROBLEM — Z51.5 PALLIATIVE CARE ENCOUNTER: Status: ACTIVE | Noted: 2025-04-24

## 2025-04-24 NOTE — TELEPHONE ENCOUNTER
----- Message from Dmitry Waldrop sent at 3/14/2020  9:11 AM CDT -----  Contact: self [phone]  Patient would like refill of rosuvastatin (CRESTOR) 5 MG tablet, losartan (COZAAR) 50 MG tablet and amLODIPine (NORVASC) 5 MG tablet sent to [facility] in [place]. Please advise.     GI Consult Note - Outpatient      PCP: Cici Mujica DO  Referring: Cici Mujica DO    No chief complaint on file.      History Of Present Illness  Jace Mistry is a 44 year old male with a past medical history of HLD presents today for consultation regarding elevated bilirubin, epigastric pain.     TBili 1.1  Dbili 0.4  US RUQ April 2025: cholelithiasis     Experiencing cramping upper abdominal pain, intermittent. Wakes him up at night. Pain has been occurring for a few years, but now worsening. Says its worse when he is hungry.   Pain subsides after taking a PPI.     Has been taking aleve lately for headaches    Denies FH of liver disease.    -Denies unintentional weight loss, difficulty swallowing, reflux symptoms/regurgitation/bloating, nausea/vomiting, change in bowel habits, blood in stool, pancreatitis, jaundice, stress, NSAID use, rashes/lumps/bumps, fevers/chills, eye pain/issues, new or worsening joint pain, new medications     -previous abdominal surgeries: none   - ARNAV/blood thinners/pacemaker: none  - family history of GI malignancy: none   - ETOH/Smoking/Illicit: etoh rare  - Occupation:      Endoscopy History:  Colon January 2024: polyps removed    Past Medical History  Past Medical History:   Diagnosis Date    High cholesterol         Surgical History  Past Surgical History:   Procedure Laterality Date    Skin graft  2022    shoulder, neck, back and chest area       Previous abdominal surgeries:     Social History  Social History     Tobacco Use    Smoking status: Former     Types: Cigarettes, Cigars    Smokeless tobacco: Never   Vaping Use    Vaping status: never used   Substance Use Topics    Alcohol use: Yes     Comment: occasionally    Drug use: Never       ETOH use:   Smoking use:   Illicit Drug use:     Family History    Family History   Problem Relation Age of Onset    Hypertension Mother     Diabetes Father        Family history of GI malignancy:     Review of Systems    Review of Systems   Constitutional: Negative.    HENT: Negative.     Respiratory: Negative.     Gastrointestinal:  Positive for abdominal pain. Negative for nausea.   Genitourinary: Negative.    Skin: Negative.    Psychiatric/Behavioral: Negative.          Allergies  ALLERGIES:  No Known Allergies    Medications  Current Outpatient Medications   Medication Sig    atorvastatin (LIPITOR) 20 MG tablet Take 1 tablet by mouth every evening.    Cholecalciferol (Vitamin D3) 1.25 mg (50,000 units) tablet Take 1 tablet by mouth 1 day a week.     No current facility-administered medications for this visit.       Taking blood thinners:      Physical Exam  Physical Exam  Vitals reviewed.   Eyes:      Extraocular Movements: Extraocular movements intact.   Abdominal:      General: There is no distension.      Palpations: Abdomen is soft.   Neurological:      Mental Status: He is alert and oriented to person, place, and time.   Psychiatric:         Behavior: Behavior normal.          Last Recorded Vitals  There were no vitals taken for this visit.    Wt Readings from Last 3 Encounters:   02/12/25 126 kg (277 lb 12.5 oz)   01/23/24 128 kg (282 lb 3 oz)       There is no height or weight on file to calculate BMI.     Lab Results    Lab Results   Component Value Date    WBC 7.6 02/12/2025    HGB 13.0 02/12/2025    HCT 41.7 02/12/2025     02/12/2025    MCV 81.9 02/12/2025       Lab Results   Component Value Date    AST 19 02/12/2025    GPT 28 02/12/2025    ALKPT 105 02/12/2025    ALBUMIN 3.9 02/12/2025    BILIRUBIN 1.1 (H) 02/12/2025    DBIL 0.4 (H) 02/24/2025     No results found for: \"ALLIVP\", \"ALKLF\", \"ALBONP\"    No results found for: \"HBAG\", \"HSAB\", \"HCAB\", \"HBINT\", \"HCV\", \"HCVMOL\", \"HCVLOG\", \"HCVGT\"  No results found for: \"AMITO\", \"SMA\", \"CERUL\", \"AAT\", \"A1APGA\", \"AFET\", \"IRON\", \"PST\", \"TIBC\"    No results found for: \"HBEVA\"      No results found for: \"SANJIV\", \"LIPA\"      Lab Results   Component Value Date    SODIUM 142  02/12/2025    BUN 13 02/12/2025    CREATININE 1.04 02/12/2025         Lab Results   Component Value Date    TSH 0.835 02/12/2025         Imaging:        Patient Active Problem List   Diagnosis    High cholesterol    Class 1 obesity due to excess calories with serious comorbidity and body mass index (BMI) of 34.0 to 34.9 in adult         Assessment and Plan:      History Of Present Illness  Jace Mistry is a 44 year old male with a past medical history of HLD presents today for consultation regarding elevated bilirubin, epigastric pain.     TBili 1.1  Dbili 0.4  US RUQ April 2025: cholelithiasis     Experiencing cramping upper abdominal pain, intermittent. Wakes him up at night. Pain has been occurring for a few years, but now worsening. Says its worse when he is hungry.   Pain subsides after taking a PPI.     Has been taking aleve lately for headaches    Denies FH of liver disease.    -Denies unintentional weight loss, difficulty swallowing, reflux symptoms/regurgitation/bloating, nausea/vomiting, change in bowel habits, blood in stool, pancreatitis, jaundice, stress, NSAID use, rashes/lumps/bumps, fevers/chills, eye pain/issues, new or worsening joint pain, new medications     -previous abdominal surgeries: none   - ARNAV/blood thinners/pacemaker: none  - family history of GI malignancy: none   - ETOH/Smoking/Illicit: etoh rare  - Occupation:      Endoscopy History:  Colon January 2024: polyps removed    #epigastric pain  #elevated bili  - unclear if gastric or biliary  - MRCP ordered  - H pylori testing ordered, stop PPI for one week to avoid false negative  - pantoprazole sent to pharmacy (needed new prescription)  - avoid trigger foods, avoid NSAIDS or ETOH  - once MRCP and H pylori results available will then decide if needs to see surgery vs EGD vs both        No orders of the defined types were placed in this encounter.       Thank you for allowing me to participate in the care of this patient.       Radha Wade, NP  [facility]    This note was generated in part using \"Dragon\" voice recognition technology. The report was reviewed by myself, but still may have unintentional errors due to inherent limitations of voice recognition technology.

## 2025-04-24 NOTE — ASSESSMENT & PLAN NOTE
Creatine stable for now. BMP reviewed- noted Estimated Creatinine Clearance: 30 mL/min (based on SCr of 1.4 mg/dL). according to latest data. Based on current GFR, CKD stage is stage 4 - GFR 15-29.  Monitor UOP and serial BMP and adjust therapy as needed. Renally dose meds. Avoid nephrotoxic medications and procedures.

## 2025-04-24 NOTE — SUBJECTIVE & OBJECTIVE
Interval History: ". Patient with hypothermia of unclear etiology. Urine culture with ESBL Klebsiella and E faecium. Hypothermic on 4/23  without localizing symptoms.     Normothermic today.     Review of Systems   Unable to perform ROS: Other     Objective:     Vital Signs (Most Recent):  Temp: 98.1 °F (36.7 °C) (04/24/25 1129)  Pulse: 63 (04/24/25 1356)  Resp: 16 (04/24/25 1129)  BP: (!) 155/70 (04/24/25 1129)  SpO2: 99 % (04/24/25 1129) Vital Signs (24h Range):  Temp:  [96.8 °F (36 °C)-98.6 °F (37 °C)] 98.1 °F (36.7 °C)  Pulse:  [59-74] 63  Resp:  [16-19] 16  SpO2:  [90 %-99 %] 99 %  BP: (120-161)/(60-74) 155/70     Weight: 68.8 kg (151 lb 10.8 oz)  Body mass index is 26.04 kg/m².    Estimated Creatinine Clearance: 30 mL/min (based on SCr of 1.4 mg/dL).     Physical Exam  Vitals and nursing note reviewed.   Constitutional:       General: She is sleeping.      Appearance: She is well-developed.   HENT:      Head: Normocephalic and atraumatic.      Right Ear: External ear normal.      Left Ear: External ear normal.   Cardiovascular:      Rate and Rhythm: Normal rate and regular rhythm.      Heart sounds: Normal heart sounds. No murmur heard.     No friction rub. No gallop.   Pulmonary:      Effort: Pulmonary effort is normal. No respiratory distress.      Breath sounds: Normal breath sounds. No stridor. No wheezing or rales.   Abdominal:      General: Bowel sounds are normal.   Musculoskeletal:         General: Swelling (upper extremities) present. No tenderness. Normal range of motion.   Skin:     General: Skin is warm and dry.          Significant Labs: CBC:   Recent Labs   Lab 04/23/25  0336 04/24/25  0524   WBC 9.65 8.10   HGB 11.0* 12.0   HCT 35.1* 37.5    173     CMP:   Recent Labs   Lab 04/23/25  0336 04/24/25  0524    143   K 3.9 3.7    111*   CO2 25 27   BUN 44* 46*   CREATININE 1.5* 1.4   CALCIUM 9.9 9.9   ALBUMIN 2.0* 2.0*   BILITOT 0.5 0.4   ALKPHOS 93 112   AST 31 33   ALT 14 18    ANIONGAP 6* 5*     Microbiology Results (last 7 days)       Procedure Component Value Units Date/Time    Blood culture [[id number]]  (Normal) Collected: 04/18/25 2005    Order Status: Completed Specimen: Blood from Peripheral, Antecubital, Right Updated: 04/24/25 0008     Blood Culture No Growth After 5 Days    Blood culture [[id number]]  (Normal) Collected: 04/18/25 2006    Order Status: Completed Specimen: Blood from Peripheral, Wrist, Right Updated: 04/24/25 0008     Blood Culture No Growth After 5 Days    Blood culture [[id number]]  (Normal) Collected: 04/20/25 1736    Order Status: Completed Specimen: Blood Updated: 04/23/25 2100     Blood Culture No Growth After 72 Hours    Blood culture [[id number]]  (Normal) Collected: 04/20/25 1739    Order Status: Completed Specimen: Blood Updated: 04/23/25 2100     Blood Culture No Growth After 72 Hours    Culture, Respiratory with Gram Stain [[id number]]     Order Status: Sent Specimen: Respiratory     Urine culture [[id number]]  (Abnormal)  (Susceptibility) Collected: 04/19/25 0121    Order Status: Completed Specimen: Urine Updated: 04/23/25 1406     Urine Culture >100,000 cfu/ml Klebsiella pneumoniae esbl      50,000 - 99,999 cfu/ml Enterococcus faecium VRE    Culture, Anaerobe [[id number]]     Order Status: Canceled Specimen: Joint Fluid from Knee, Left     Aerobic culture [[id number]]     Order Status: Canceled Specimen: Bone from Knee, Left     Gram stain [[id number]]     Order Status: Canceled Specimen: Joint Fluid from Knee, Left     AFB Culture & Smear [[id number]]     Order Status: Canceled Specimen: Joint Fluid from Knee, Left     Fungus culture [[id number]]     Order Status: Canceled Specimen: Joint Fluid, Left Knee     Urine culture [[id number]] Collected: 04/19/25 1015    Order Status: Completed Specimen: Urine Updated: 04/21/25 0016     Urine Culture Multiple organisms isolated. None in predominance. Repeat if clinically necessary.    Culture, Respiratory  with Gram Stain [[id number]]     Order Status: Sent Specimen: Respiratory             Significant Imaging: I have reviewed all pertinent imaging results/findings within the past 24 hours.

## 2025-04-24 NOTE — SUBJECTIVE & OBJECTIVE
Interval History: Palliative Medicine re-introduced to patient and family. Goals of care discussion in progress.     Past Medical History:   Diagnosis Date    Acute biliary pancreatitis 07/27/2024    Acute pancreatitis 07/27/2024    Acute pulmonary embolism 4/21/2025    Anemia 07/12/2024    Aortic atherosclerosis     Arthritis     knee joint pain    Asthma-COPD overlap syndrome 08/22/2022    home o2    Breast cancer 2002    left breast & lymph nodes-s/p sx with chemo    Cataracts, bilateral     Chronic diastolic heart failure 12/24/2019    Chronic kidney disease, stage 3     Class 1 obesity due to excess calories with serious comorbidity and body mass index (BMI) of 30.0 to 30.9 in adult 05/16/2024    Gram-negative bacteremia - Pasturella multocida 07/12/2024    History of chemotherapy     last treatment 12/2002 (had 8 treatments)    Hyperlipidemia 11/20/2016    Hypertension     Lymphedema of both lower extremities 01/25/2022    Nephrolithiasis 06/02/2014    Osteoporosis     Paroxysmal atrial fibrillation 06/07/2021    Renal osteodystrophy 01/14/2016    Sepsis 4/19/2025    Severe protein-calorie malnutrition 4/23/2025    Shock 04/06/2025    Subclinical hypothyroidism 04/06/2025    Venous stasis dermatitis of both lower extremities 01/25/2022    Vitamin D insufficiency        Past Surgical History:   Procedure Laterality Date    ABLATION, ATRIAL FLUTTER, TYPICAL N/A 6/17/2024    Procedure: Ablation, Atrial Flutter, Typical;  Surgeon: Taz Church MD;  Location: [facility] EP LAB;  Service: Cardiology;  Laterality: N/A;  AFL, RFA, LORENE, MAKAYLA (cx if SR), anes, MB, 3 Prep    BREAST BIOPSY Left 2002    core bx, +    BREAST BIOPSY Right 2018    core    BREAST BIOPSY Right 2019    BREAST LUMPECTOMY Left 2002    CYSTOSCOPY  4/28/2022    Procedure: CYSTOSCOPY;  Surgeon: Vik Ware MD;  [facility];  Service: Urology;;    CYSTOSCOPY  5/30/2022    Procedure: CYSTOSCOPY;  Surgeon: Bonnie Knutson,  MD;  Location: [facility] OR 1ST FLR;  Service: Urology;;    ECHOCARDIOGRAM,TRANSESOPHAGEAL N/A 6/17/2024    Procedure: Transesophageal echo (MAKAYLA) intra-procedure log documentation;  Surgeon: Nestor Martinez MD;  Location: [facility] EP LAB;  Service: Cardiology;  Laterality: N/A;    ERCP N/A 7/30/2024    Procedure: ERCP (ENDOSCOPIC RETROGRADE CHOLANGIOPANCREATOGRAPHY);  Surgeon: Sierra Cotto MD;  Location: [facility] ENDO (2ND FLR);  Service: Endoscopy;  Laterality: N/A;    IMPLANTATION OF LEADLESS PACEMAKER N/A 4/10/2025    Procedure: INSERTION, CARDIAC PACEMAKER, LEADLESS;  Surgeon: CAROLINE Solis MD;  Location: [facility] EP LAB;  Service: Cardiology;  Laterality: N/A;  CHB, MICRA leadless PPM, MDT, Anes, EH, CICU 3073    LASER LITHOTRIPSY  5/30/2022    Procedure: LITHOTRIPSY, USING LASER;  Surgeon: Bonnie Knutson MD;  [facility];  Service: Urology;;    LITHOTRIPSY      MASTECTOMY Left 06/2002    left-& lymph node dissection    REMOVAL, PACEMAKER, TEMPORARY TRANSVENOUS  4/10/2025    Procedure: Removal, Pacemaker, Temporary Transvenous;  Surgeon: CAROLINE Solis MD;  Location: [facility] EP LAB;  Service: Cardiology;;    REPLACEMENT OF STENT Right 5/30/2022    Procedure: REPLACEMENT, STENT;  Surgeon: Bonnie Knutson MD;  [facility];  Service: Urology;  Laterality: Right;    RETROGRADE PYELOGRAPHY Right 4/28/2022    Procedure: PYELOGRAM, RETROGRADE;  Surgeon: Vik Ware MD;  Location: [facility] OR 1ST FLR;  Service: Urology;  Laterality: Right;    ROBOT-ASSISTED LAPAROSCOPIC PARTIAL NEPHRECTOMY USING DA JESÚS XI Right 3/11/2021    Procedure: XI ROBOTIC NEPHRECTOMY, PARTIAL;  Surgeon: Taz Ortega MD;  Location: [facility] OR 2ND FLR;  Service: Urology;  Laterality: Right;  4hr/ gen with regional  [facility] confirmation:  [id number] for 11:15am case NC    URETEROSCOPIC REMOVAL OF URETERIC CALCULUS Right 5/30/2022    Procedure: REMOVAL, CALCULUS, URETER, URETEROSCOPIC;  Surgeon: Bonnie  JORJE Knutson MD;  Location: [facility] OR [address] [place] [address];  Service: Urology;  Laterality: Right;    ureteroscopy Bilateral     6.14    URETEROSCOPY Right 5/30/2022    Procedure: URETEROSCOPY;  Surgeon: Bonnie Knutson MD;  Location: [facility] OR [address] [place] [address];  Service: Urology;  Laterality: Right;       Review of patient's allergies indicates:   Allergen Reactions    Adhesive tape-silicones     Adhesive Rash     Pt states she removed a LATEX bandaid and the skin beneath was swollen and red. No other latex causes a reaction.       Medications:  Continuous Infusions:  Scheduled Meds:   apixaban  10 mg Oral BID    Followed by    [START ON 4/27/2025] apixaban  5 mg Oral BID    DAPTOmycin (CUBICIN) IV (PEDS and ADULTS)  12 mg/kg Intravenous Q48H    levothyroxine  50 mcg Oral Before breakfast    LIDOcaine  1 patch Transdermal Q24H    meropenem (MERREM) extended infusion  1 g Intravenous Q12H    metoprolol tartrate  12.5 mg Oral BID     PRN Meds:  Current Facility-Administered Medications:     acetaminophen, 650 mg, Oral, Q6H PRN    albuterol-ipratropium, 3 mL, Nebulization, Q6H PRN    dextrose 50%, 12.5 g, Intravenous, PRN    dextrose 50%, 25 g, Intravenous, PRN    glucagon (human recombinant), 1 mg, Intramuscular, PRN    glucose, 16 g, Oral, PRN    glucose, 24 g, Oral, PRN    hydrOXYzine HCL, 25 mg, Oral, TID PRN    nitroGLYCERIN, 0.4 mg, Sublingual, Q5 Min PRN    ondansetron, 8 mg, Oral, Q8H PRN    polyethylene glycol, 17 g, Oral, BID PRN    senna, 8.6 mg, Oral, Daily PRN    simethicone, 1 tablet, Oral, TID PRN    sodium chloride, 1 spray, Each Nostril, PRN    sodium chloride 0.9%, 10 mL, Intravenous, PRN    Family History       Problem Relation (Age of Onset)    Arthritis Mother, Sister    Bone cancer Mother    Breast cancer Mother    Cancer Father    Crohn's disease Daughter    Fibroids Daughter    No Known Problems Brother, Daughter          Tobacco Use    Smoking status: Former     Current packs/day: 0.00     Average packs/day: 1  pack/day for 50.0 years (50.0 ttl pk-yrs)     Types: Cigarettes     Start date: 1960     Quit date: 5/20/2009     Years since quitting: 15.9    Smokeless tobacco: Former   Substance and Sexual Activity    Alcohol use: No    Drug use: No    Sexual activity: Not Currently     Partners: Male     Birth control/protection: Partner-Vasectomy       Review of Systems   Constitutional:  Positive for activity change and fatigue.   HENT:  Positive for voice change.    Respiratory:  Positive for shortness of breath.    Cardiovascular:  Positive for leg swelling.   Gastrointestinal:  Positive for nausea.   Neurological:  Positive for weakness.   Hematological:  Bruises/bleeds easily.     Objective:     Vital Signs (Most Recent):  Temp: 98.1 °F (36.7 °C) (04/24/25 1129)  Pulse: 63 (04/24/25 1356)  Resp: 16 (04/24/25 1129)  BP: (!) 155/70 (04/24/25 1129)  SpO2: 99 % (04/24/25 1129) Vital Signs (24h Range):  Temp:  [96.8 °F (36 °C)-98.6 °F (37 °C)] 98.1 °F (36.7 °C)  Pulse:  [59-74] 63  Resp:  [16-19] 16  SpO2:  [90 %-99 %] 99 %  BP: (120-161)/(60-74) 155/70     Weight: 68.8 kg (151 lb 10.8 oz)  Body mass index is 26.04 kg/m².       Physical Exam  Vitals and nursing note reviewed.   Constitutional:       General: She is not in acute distress.     Appearance: She is ill-appearing.   HENT:      Head: Normocephalic and atraumatic.      Nose: Nose normal.      Mouth/Throat:      Mouth: Mucous membranes are dry.   Eyes:      Extraocular Movements: Extraocular movements intact.   Cardiovascular:      Rate and Rhythm: Normal rate.   Pulmonary:      Effort: Pulmonary effort is normal.   Skin:     General: Skin is warm and dry.   Neurological:      Mental Status: She is alert and oriented to person, place, and time.      Motor: Weakness present.   Psychiatric:         Mood and Affect: Mood normal.         Behavior: Behavior normal.         Thought Content: Thought content normal.         Judgment: Judgment normal.            Review of  Symptoms      Symptom Assessment (ESAS 0-10 Scale)  Pain:  0  Dyspnea:  0  Anxiety:  0  Nausea:  0  Depression:  0  Anorexia:  0  Fatigue:  0  Insomnia:  0  Restlessness:  0  Agitation:  0     CAM / Delirium:  Negative  Constipation:  Negative  Diarrhea:  Negative      Modified Berto Scale:  0    Performance Status:  50    Psychosocial/Cultural:   See Palliative Psychosocial Note: Yes  - Prior to patient's hospitalizations, she was living alone. Daughter lived in neighborhood and assisted patient with cleaning and cooking  - Over the last year, patient/daughters report patient has spent more time in the hospital/rehab than at home.   - Patient states he enjoys sitting on her porch, gardening in her garden pots, and spending time with her cat.   - Patient's primary support system includes her 2 daughters, Sirisha and Esther.  **Primary  to Follow**  Palliative Care  Consult: Yes    Spiritual:  F - Megan and Belief:  [employer]        Advance Care Planning   Advance Directives:   Living Will: Yes        Copy on chart: Yes    LaPOST: No    Do Not Resuscitate Status: Yes    Medical Power of : Yes    Agent's Name:  Esther Barajas   Agent's Contact Number:  [phone]    Decision Making:  Patient answered questions and Family answered questions  Goals of Care: The patient endorses that what is most important right now is to focus on spending time at home, remaining as independent as possible, quality of life, even if it means sacrificing a little time, and continuing infectious workup    Accordingly, we have decided that the best plan to meet the patient's goals includes continuing with treatment and allowing for additional time for goals of care discussions           Significant Labs: All pertinent labs within the past 24 hours have been reviewed.  CBC:   Recent Labs   Lab 04/24/25 0524   WBC 8.10   HGB 12.0   HCT 37.5   MCV 91        BMP:  Recent Labs   Lab 04/24/25 0524       K 3.7   *   CO2 27   BUN 46*   CREATININE 1.4   CALCIUM 9.9   MG 2.1     LFT:  Lab Results   Component Value Date    AST 33 04/24/2025    ALKPHOS 112 04/24/2025    BILITOT 0.4 04/24/2025     Albumin:   Albumin   Date Value Ref Range Status   04/24/2025 2.0 (L) 3.5 - 5.2 g/dL Final   02/18/2025 2.8 (L) 3.5 - 5.2 g/dL Final     Protein:   Total Protein   Date Value Ref Range Status   02/18/2025 6.0 6.0 - 8.4 g/dL Final     Lactic acid:   Lab Results   Component Value Date    LACTATE 1.6 04/21/2025    LACTATE 0.8 04/20/2025       Significant Imaging: I have reviewed all pertinent imaging results/findings within the past 24 hours.

## 2025-04-24 NOTE — PT/OT/SLP PROGRESS
"Occupational Therapy   Treatment    Name: Misa Barajas  MRN: [id number]  Admitting Diagnosis:  Complete heart block  14 Days Post-Op    Recommendations:     Discharge Recommendations: Moderate Intensity Therapy  Discharge Equipment Recommendations:  walker, rolling  Barriers to discharge:  Decreased caregiver support, Other (Comment) (increased skilled A required)    Assessment:     Misa Barajas is a 79 y.o. female with a medical diagnosis of Complete heart block.  She presents with poor motivation with presented therapeutic activities, however able to participate with provided encouragement and task modifications. Pt with delayed decision making this date and highly impaired activity tolerance. Pt required increased time and energy conservation breaks. Pt is currently not at her PLOF and would greatly benefit from continued skilled OT intervention in efforts to participate in meaningful occupations of choice at the highest level of independence.   Performance deficits affecting function are weakness, impaired endurance, impaired self care skills, impaired functional mobility, gait instability, impaired balance, decreased safety awareness, impaired cognition, impaired cardiopulmonary response to activity.     Rehab Prognosis:  Good; patient would benefit from acute skilled OT services to address these deficits and reach maximum level of function.       Plan:     Patient to be seen 4 x/week to address the above listed problems via self-care/home management, therapeutic activities, therapeutic exercises  Plan of Care Expires: 05/13/25  Plan of Care Reviewed with: patient    Subjective     Chief Complaint: "no I can't stand"  Patient/Family Comments/goals: pt agreeable to session  Pain/Comfort:  Pain Rating 1: 0/10  Pain Rating Post-Intervention 1: 0/10    Objective:     Communicated with: RN prior to session.  Patient found supine with telemetry, oxygen, PureWick, peripheral IV upon OT entry to room.    General " Precautions: Standard, fall    Orthopedic Precautions:N/A  Braces: N/A  Respiratory Status: Nasal cannula, flow 5 L/min     Occupational Performance:     Bed Mobility:    Patient completed Scooting/Bridging with stand by assistance  Patient completed Supine to Sit with contact guard assistance  Patient completed Sit to Supine with contact guard assistance     Functional Mobility/Transfers:  Patient completed Sit <> Stand Transfer with maximal assistance  with  hand-held assist   Functional Mobility: deferred 2/2 impaired standing tolerance and pt persistent refusal   Pt able to sit EOB with SBA    Activities of Daily Living:  Grooming: set up A for oral care seated EOB  Toileting: dependence purewick in place      AMPA 6 Click ADL: 12    Treatment & Education:  Pt provided with therapeutic use of self, positive reinforcement, cognitive behavioral interventions, and positive thinking strategies in efforts to address pt's current psychosocial well being within the OT scope of practice.    Provided education regarding role of OT, POC, & discharge recommendations with pt verbalizing understanding.  Pt had no further questions & when asked whether there were any concerns pt reported none.     Patient left HOB elevated with all lines intact, call button in reach, RN notified, and CNA present    GOALS:   Multidisciplinary Problems       Occupational Therapy Goals          Problem: Occupational Therapy    Goal Priority Disciplines Outcome Interventions   Occupational Therapy Goal     OT, PT/OT Progressing    Description: Goals to be met by: 5/13/2025     Patient will increase functional independence with ADLs by performing:    UE Dressing with Minimal Assistance.  LE Dressing with Moderate Assistance.  Grooming while standing at sink with Contact Guard Assistance.  Toileting from bedside commode with Minimal Assistance for hygiene and clothing management.   Step transfer with Moderate Assistance  Toilet transfer to bedside  commode with Moderate Assistance.                         DME Justifications:   Misa's mobility limitation cannot be sufficiently resolved by the use of a cane. Her functional mobility deficit can be sufficiently resolved with the use of a Rolling Walker. Patient's mobility limitation significantly impairs their ability to participate in one of more activities of daily living.  The use of a RW will significantly improve the patient's ability to participate in MRADLS and the patient will use it on regular basis in the home.    Time Tracking:     OT Date of Treatment: 04/24/25  OT Start Time: 1042  OT Stop Time: 1111  OT Total Time (min): 29 min    Billable Minutes:Self Care/Home Management 29    OT/MARYA: OT     Number of MARYA visits since last OT visit: 0    4/24/2025

## 2025-04-24 NOTE — PROGRESS NOTES
[facility] - Cardiology Stepdown  Infectious Disease  Progress Note    Patient Name: Misa Barajas  MRN: [id number]  Admission Date: 4/6/2025  Length of Stay: 18 days  Attending Physician: Uche Antunez MD  Primary Care Provider: Isela Langston MD    Isolation Status: Contact  Assessment/Plan:      Renal/  Cystitis  Possible cystitis in the setting of hypothermia and positive urine culture. Patient denies urinary tract symptoms however daughters at bedside made known that patient under reports symptoms.   Will plan to treat as in hypothermia.     Other  Hypothermia  I have reviewed hospital notes from   service and other specialty providers. I have also reviewed CBC, CMP/BMP,  cultures and imaging with my interpretation as documented.     Recurrent hypothermia of unclear etiology. Hypothermic once again today and without leukocytosis. Urine culture with ESBL K pneumoniae and E faecium. Patient denies urinary tract symptoms however daughters say patient under reports symptoms. Baseline CK 27. Tagged scan ordered due to persistent hypothermia. Patient now normothermic.  Continue meropenem. Plan for a 7 day course (end date: 4/26)  Continue daptomycin.  CK weekly.   Contact isolation precautions.  Follow up tagged WBC scan.  Discussed management plan with the staff from  service.        Anticipated Disposition: per primary    Thank you for your consult. I will follow-up with patient. Please contact us if you have any additional questions.    Melinda Morales MD  Infectious Disease  [facility] - [facility] Stepdown    50 minutes of total time spent on the encounter, which includes face to face time and non-face to face time preparing to see the patient (eg, review of tests), obtaining and/or reviewing separately obtained history, documenting clinical information in the electronic or other health record, independently interpreting results (not separately reported) and communicating results to the  "patient/family/caregiver, or care coordination (not separately reported).     Subjective:     Principal Problem:Complete heart block    HPI: Ms. Barajas is a 79F with PMH of HFpEF, COPD on 2L, aflutter s/p ablation, CKD3, HTN, HLD, chronic venous insufficiency, admitted for complete heart block requiring temporary pacing, also requiring intubation and pressors, has since been downgraded from ICU. Infectious disease consulted for "Recurrent sepsis with hypothermia, etiology unclear, possible PNA, possible UTUI, ongoing hypothermia requiring jeffrey hugger despite over 24 hrs on Zosyn and Vanc. Repeating BCx, broadening to Vanc/Sher/Dinah for now".     She reports some nausea and L knee pain, no other symptoms. Admission BCX negative, and 4/18 and 4/20 NG. 4/19 UCX with GNR and E faecium. Has nonocclusive thrombi of L IJ and cephalic veins. CXR with pulmonary congestion. L knee XR with small suprapatellar bursa effusion.   Interval History: "". Patient with hypothermia of unclear etiology. Urine culture with ESBL Klebsiella and E faecium. Hypothermic on 4/23  without localizing symptoms.     Normothermic today.     Review of Systems   Unable to perform ROS: Other     Objective:     Vital Signs (Most Recent):  Temp: 98.1 °F (36.7 °C) (04/24/25 1129)  Pulse: 63 (04/24/25 1356)  Resp: 16 (04/24/25 1129)  BP: (!) 155/70 (04/24/25 1129)  SpO2: 99 % (04/24/25 1129) Vital Signs (24h Range):  Temp:  [96.8 °F (36 °C)-98.6 °F (37 °C)] 98.1 °F (36.7 °C)  Pulse:  [59-74] 63  Resp:  [16-19] 16  SpO2:  [90 %-99 %] 99 %  BP: (120-161)/(60-74) 155/70     Weight: 68.8 kg (151 lb 10.8 oz)  Body mass index is 26.04 kg/m².    Estimated Creatinine Clearance: 30 mL/min (based on SCr of 1.4 mg/dL).     Physical Exam  Vitals and nursing note reviewed.   Constitutional:       General: She is sleeping.      Appearance: She is well-developed.   HENT:      Head: Normocephalic and atraumatic.      Right Ear: External ear normal.      Left Ear: " External ear normal.   Cardiovascular:      Rate and Rhythm: Normal rate and regular rhythm.      Heart sounds: Normal heart sounds. No murmur heard.     No friction rub. No gallop.   Pulmonary:      Effort: Pulmonary effort is normal. No respiratory distress.      Breath sounds: Normal breath sounds. No stridor. No wheezing or rales.   Abdominal:      General: Bowel sounds are normal.   Musculoskeletal:         General: Swelling (upper extremities) present. No tenderness. Normal range of motion.   Skin:     General: Skin is warm and dry.          Significant Labs: CBC:   Recent Labs   Lab 04/23/25 0336 04/24/25 0524   WBC 9.65 8.10   HGB 11.0* 12.0   HCT 35.1* 37.5    173     CMP:   Recent Labs   Lab 04/23/25 0336 04/24/25 0524    143   K 3.9 3.7    111*   CO2 25 27   BUN 44* 46*   CREATININE 1.5* 1.4   CALCIUM 9.9 9.9   ALBUMIN 2.0* 2.0*   BILITOT 0.5 0.4   ALKPHOS 93 112   AST 31 33   ALT 14 18   ANIONGAP 6* 5*     Microbiology Results (last 7 days)       Procedure Component Value Units Date/Time    Blood culture [[id number]]  (Normal) Collected: 04/18/25 2005    Order Status: Completed Specimen: Blood from Peripheral, Antecubital, Right Updated: 04/24/25 0008     Blood Culture No Growth After 5 Days    Blood culture [[id number]]  (Normal) Collected: 04/18/25 2006    Order Status: Completed Specimen: Blood from Peripheral, Wrist, Right Updated: 04/24/25 0008     Blood Culture No Growth After 5 Days    Blood culture [[id number]]  (Normal) Collected: 04/20/25 1736    Order Status: Completed Specimen: Blood Updated: 04/23/25 2100     Blood Culture No Growth After 72 Hours    Blood culture [[id number]]  (Normal) Collected: 04/20/25 1739    Order Status: Completed Specimen: Blood Updated: 04/23/25 2100     Blood Culture No Growth After 72 Hours    Culture, Respiratory with Gram Stain [[id number]]     Order Status: Sent Specimen: Respiratory     Urine culture [[id number]]  (Abnormal)   (Susceptibility) Collected: 04/19/25 0121    Order Status: Completed Specimen: Urine Updated: 04/23/25 1406     Urine Culture >100,000 cfu/ml Klebsiella pneumoniae esbl      50,000 - 99,999 cfu/ml Enterococcus faecium VRE    Culture, Anaerobe [[id number]]     Order Status: Canceled Specimen: Joint Fluid from Knee, Left     Aerobic culture [[id number]]     Order Status: Canceled Specimen: Bone from Knee, Left     Gram stain [[id number]]     Order Status: Canceled Specimen: Joint Fluid from Knee, Left     AFB Culture & Smear [[id number]]     Order Status: Canceled Specimen: Joint Fluid from Knee, Left     Fungus culture [[id number]]     Order Status: Canceled Specimen: Joint Fluid, Left Knee     Urine culture [[id number]] Collected: 04/19/25 1015    Order Status: Completed Specimen: Urine Updated: 04/21/25 0016     Urine Culture Multiple organisms isolated. None in predominance. Repeat if clinically necessary.    Culture, Respiratory with Gram Stain [[id number]]     Order Status: Sent Specimen: Respiratory             Significant Imaging: I have reviewed all pertinent imaging results/findings within the past 24 hours.

## 2025-04-24 NOTE — ASSESSMENT & PLAN NOTE
Creatine stable for now. BMP reviewed- noted Estimated Creatinine Clearance: 28 mL/min (A) (based on SCr of 1.5 mg/dL (H)). according to latest data. Based on current GFR, CKD stage is stage 4 - GFR 15-29.  Monitor UOP and serial BMP and adjust therapy as needed. Renally dose meds. Avoid nephrotoxic medications and procedures.

## 2025-04-24 NOTE — ASSESSMENT & PLAN NOTE
Very slowly uptrending Na since admission. Peak 150 on 4/17 for which pt received 250cc LR. Na 149 the following day & pt w/ increased O2 needs & LE edema. Unclear etiology as pt does not appear hypovolemic.   - encourage water PO  - Consider Nephrology consult  - Monitor BMP Q12-24H     4/24  Resolved

## 2025-04-24 NOTE — ASSESSMENT & PLAN NOTE
Anemia is likely due to chronic disease due to Chronic Kidney Disease. Most recent hemoglobin and hematocrit are listed below.  Recent Labs     04/22/25  0450 04/23/25  0336 04/24/25  0524   HGB 11.7* 11.0* 12.0   HCT 38.6 35.1* 37.5     Plan  - Monitor serial CBC: Daily  - Transfuse PRBC if patient becomes hemodynamically unstable, symptomatic or H/H drops below 7/21.  - Patient has not received any PRBC transfusions to date  - Patient's anemia is currently stable

## 2025-04-24 NOTE — PT/OT/SLP PROGRESS
"Physical Therapy CoTreatment  OT present for cotreat due to pt's multiple medical comorbidities and functional/cognition deficits requiring two skilled therapists to appropriately progress pt's musculoskeletal strength, neuromuscular control, and endurance while taking into consideration medical acuity and pt safety.      Patient Name:  Misa Barajas   MRN:  [id number]    Recommendations:     Discharge Recommendations: Moderate Intensity Therapy  Discharge Equipment Recommendations: walker, rolling  Barriers to discharge: None    Assessment:     Misa Barajas is a 79 y.o. female admitted with a medical diagnosis of Complete heart block.  She presents with the following impairments/functional limitations: weakness, impaired endurance, impaired self care skills, impaired functional mobility, gait instability, impaired balance. Pt somewhat agreeable for therapy, some encouragement required for pt to participate, pt CGA for sup <> sit, max A x 1 for sit <> stand and able to scoot 4 times to HOB, requires frequent breaks between activities due to SOB    Rehab Prognosis: Fair; patient would benefit from acute skilled PT services to address these deficits and reach maximum level of function.    Recent Surgery: Procedure(s) (LRB):  INSERTION, CARDIAC PACEMAKER, LEADLESS (N/A)  Removal, Pacemaker, Temporary Transvenous 14 Days Post-Op    Plan:     During this hospitalization, patient to be seen 4 x/week to address the identified rehab impairments via gait training, therapeutic activities, therapeutic exercises, neuromuscular re-education and progress toward the following goals:    Plan of Care Expires:  05/13/25    Subjective     Chief Complaint: SOB  Patient/Family Comments/goals: "I don't want to stand, it makes me lose my breathe"  Pain/Comfort:  Pain Rating 1: 0/10  Pain Rating Post-Intervention 1: 0/10      Objective:     Communicated with RN prior to session.  Patient found supine with telemetry, oxygen, PureWick, " peripheral IV upon PT entry to room.     General Precautions: Standard, fall  Orthopedic Precautions: N/A  Braces: N/A  Respiratory Status: High flow, flow 5 L/min     Functional Mobility:    Bed Mobility:   Rolling: to R side with contact guard assistance  Supine > Sit: contact guard assistance  Sit > Supine: contact guard assistance    Transfers:   Sit <> Stand Transfer: maximal assistance from EOB without AD; HHA, able to maintain stand for 30s w/VC's for posture, to bring hips anteriorly and pursed lipped breathing    Balance:   Sitting balance: FAIR+: Maintains balance through MINIMAL excursions of active trunk motion; pt sits EOB while performing ADL's w/OT, able to scoot x 4 to HOB, mod SOB after activity  Standing balance:   POOR: Needs MODERATE assist to maintain            AM-PAC 6 CLICK MOBILITY  Turning over in bed (including adjusting bedclothes, sheets and blankets)?: 3  Sitting down on and standing up from a chair with arms (e.g., wheelchair, bedside commode, etc.): 2  Moving from lying on back to sitting on the side of the bed?: 3  Moving to and from a bed to a chair (including a wheelchair)?: 2  Need to walk in hospital room?: 2  Climbing 3-5 steps with a railing?: 1  Basic Mobility Total Score: 13       Treatment & Education:  Education:  PT role and PoC to increase strength and endurance    Therex:  LAQ, marching, ankle pumps x 10 in sitting    Patient left supine with all lines intact, call button in reach, RN notified, and PCT present..    GOALS:   Multidisciplinary Problems       Physical Therapy Goals          Problem: Physical Therapy    Goal Priority Disciplines Outcome Interventions   Physical Therapy Goal     PT, PT/OT Progressing    Description: PT goals until 5/13/25    1. Pt supine to sit with CGA - met 4/22/2025  Updated: supervision  2. Pt sit to supine with CGA-not met  3. Pt sit to stand with RW with CGA-not met  4. Pt to perform gait 10ft with RW with CGA.-not met  5. Pt to  transfer bed to/from bedside chair with HHA with minimal assist.-not met  6. Pt to up/down 1 step with RW with minimal assist.-not met  7. Pt to perform B LE exs in sitting or supine x 10 reps to strengthen B LE to improve functional mobility.-not met                         DME Justifications:   Misa's mobility limitation cannot be sufficiently resolved by the use of a cane. Her functional mobility deficit can be sufficiently resolved with the use of a Rolling Walker. Patient's mobility limitation significantly impairs their ability to participate in one of more activities of daily living.  The use of a RW will significantly improve the patient's ability to participate in MRADLS and the patient will use it on regular basis in the home.    Time Tracking:     PT Received On: 04/24/25  PT Start Time: 1042     PT Stop Time: 1111  PT Total Time (min): 29 min     Billable Minutes: Therapeutic Activity 29    Treatment Type: Treatment  PT/PTA: PTA     Number of PTA visits since last PT visit: 1 04/24/2025

## 2025-04-24 NOTE — ASSESSMENT & PLAN NOTE
Impression:  Misa Barajas is a 79 y.o. female with PMHx of COPD on 2L home O2, HFpEF, AFL s/p RFA (6/17/24 w/ Dr. Church), CKD III, HTN, HLD, chronic venous insufficiency, orthostatic hypotension who was brought via EMS after per history she was found confused and bradycardiac with HR in the 20s. Patient was percutaneously paced. During hospitalization, was intubated then extubated with residual hoarseness of voice (ENT following), required intermittent pacing by TVP, a Micra PM by EP placed 04/10, aggressive diuresing, recurrent sepsis and hypothermia, patient has required several trials of antibiotics, anticipating WBC scan on 4/25 (ID following), and presumed PE (unable to verify with CTA secondary to MARQUEZ).      Of note, patient has had 7x ED visits in the last year. Patient has engaged in goals of care discussions with Dr. Velasquez of Palliative Medicine for goals of care and symptom management. Palliative Medicine was consulted by primary, Dr. Antunez, for goals of care and advanced care planning discussions.    Patient is currently resting in bed, AAOx4, daughters (Sirisha and Esther) at bedside. Patient and family are amenable to Pal Med. Pal Med APRN and LCSW at bedside for goals of care discussions.     - Prior experience with serious illness: yes  -The patient has previously engaged in advance care planning or GOC discussions  - Insight/Understanding of illness: yes      Advance Care Planning     Date: 04/24/2025    Code Status  In light of the patients advanced and life limiting illness, I engaged the patient and family in a voluntary conversation about the patient's preferences for care  at the very end of life. The patient wishes to have a natural, peaceful death.  Along those lines, the patient does not wish to have CPR or other invasive treatments performed when her heart and/or breathing stops. I communicated to the patient and family that a DNR order would be placed in her medical record to reflect  this preference.        [facility]  I engaged the patient and family in a voluntary conversation about advance care planning and we specifically addressed what the goals of care would be moving forward, in light of the patient's change in clinical status, specifically recurrent hospitalizations over past year, hypothermia, acute renal failure, CHF exacerbation, and current hosptialization.  We did specifically address the patient's likely prognosis, which  appears to be poor .  We explored the patient's values and preferences for future care.  The patient and family endorses that what is most important right now is to focus on spending time at home, avoiding the hospital, quality of life, even if it means sacrificing a little time, and allowing additional time for infectious workup    Accordingly, we have decided that the best plan to meet the patient's goals includes continuing with treatment and continuing goals of care discussions pending infectious workup               Life Limiting Diagnosis  Acute on Chronic CHF  Acute Renal Failure on CKD stage 3b  Complete Heart Block            Symptom Management  - Pain: no  - acetaminophen 650mg q6h PRN    - Dyspnea: no  - on 2L at home  - maintained on 4L O2  - 25h SpO2: 90-99%    - Constipation: no  - senna-docusate daily PRN  - poluethylene glycol daily PRN    - Nausea: no  - ondansetron 8mg q8h    - Debility: yes  - Functional status: fair.  Pt was not able to manage ADLs  - Fall precautions  - PT/OT recommending moderate intensity therapy          Recommendations  - Please see above  - Continue management per primary team  - Patient and family would benefit from continued education and information regarding plan of care, prognosis, and what to expect in the future  - Most important goals at this time: completing infectious workup and obtaining more information regarding plan of care   - Code status: DNR  - Disposition: TBD        The above recommendations communicated  directly to primary team on 4/24/25  Thank you for consulting Palliative Medicine.

## 2025-04-24 NOTE — ASSESSMENT & PLAN NOTE
Interventions/Recommendations (treatment strategy):  1. Continue low Na diet as tolerated    - please continue to document PO % intake via flowsheets   2. Continue boost plus TID 3. Encourage good intake  4. RD to monitor weight, labs, intake, tolerance

## 2025-04-24 NOTE — ASSESSMENT & PLAN NOTE
Anemia is likely due to chronic disease due to Chronic Kidney Disease. Most recent hemoglobin and hematocrit are listed below.  Recent Labs     04/21/25  0520 04/22/25  0450 04/23/25  0336   HGB 12.0 11.7* 11.0*   HCT 39.9 38.6 35.1*     Plan  - Monitor serial CBC: Daily  - Transfuse PRBC if patient becomes hemodynamically unstable, symptomatic or H/H drops below 7/21.  - Patient has not received any PRBC transfusions to date  - Patient's anemia is currently stable

## 2025-04-24 NOTE — PROGRESS NOTES
[facility] - Cardiology [facility] Medicine  Progress Note    Patient Name: Misa Barajas  MRN: [id number]  Patient Class: IP- Inpatient   Admission Date: 4/6/2025  Length of Stay: 18 days  Attending Physician: Uche Antunez MD  Primary Care Provider: Isela Langston MD        Subjective     Principal Problem:Complete heart block        HPI:  Ms. Misa Barajas is a 78 yo female with COPD on 2L home O2, HFpEF, AFL s/p RFA (6/17/24 w/ Dr. Church), CKD III, HTN, HLD, chronic venous insufficiency, orthostatic hypotension who was brought via EMS after per history she was found confused and bradycardiac with HR in the 20s.They started epi and transcutaneous pacing. Given Versed for the transcutaneous pacing. They had to bag her in route. We do not have strip of underlying rhythm. Patient obtunded for which she was intubated for airway protection on arrival to the ED and transcutaneous pacing was exchanged to transvenous pacing via RIJ (threshold 0.6). Underlying rate 44 bifascicular block. Known RBBB. Per daughters patient had called them over the phone when they noticed she appeared confused. Due to concerns for a possible syncopal episode EMS was called. Prior to event patient had been complaining of malaise and nausea for which she took about two Zofran pills per the daughters.      She had been recently seen on 3/27/25 at the [facility] visit following recent admission for AHRF 2/2 ADHF and COPD exacerbation. On ED presentation BNP elevated to 2500, troponin elevated to 19, CXR with pulmonary edema. She was started on steroids, nebs and antibiotics in addition to IV diuresis. Updated TTE stable from prior, showing HFpEF with elevated CVP 15. Her O2 requirements improved to baseline with diuresis and she was subsequently discharged. At that visit patient had not required midodrine as blood pressure had remained stable and she was asymptomatic.      Off note, patient was taken off OAC due to bleeding issues and no  "recurrent of typical atrial flutter after ablation.     Overview/Hospital Course:  Admitted to CCU for CHB in s/o hyperkalemia & in the presence of conduction disease/RBBB & LAHB - stable w/ temporary wire. ??Septic shock deemed to be d/t UTI - ?? UA with only 7 WBCs); vasopressors weaned off. HTN; started NG gtt. Acute on chronic hypoxic/hypercapnic RF & obtunded; intubated. Successfully extubated 04/08. MARQUEZ on CKD; improved w/ good UOP response to diuresis. PVCs w/ intermittent pacing by TVP. Switched pip-tazo to amox-clav on 04/09; continued to improve; empiric EED 04/13 for 7 day course. Micra PM by EP placed 04/10. Medically stable for stepdown. Patient was extremely short of breath with physical therapy, chest x-ray concerning for pulmonary edema.  Started patient on IV diuresis, now euvolemic.  ENT was consulted yesterday due to hoarseness of voice.  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 Pt uninterested in any procedure at this time, ENT recommended SLP follow up.  Able to tolerate regular diet at this point.  Now agreeable for SNF, working on placement. Course c/b worsening hypoxia & hyperNa.      Overnight 4/18-4/19, decompensated with recurrent sepsis, hypothermia 92, possible PNA with infiltrates on CXR, Zosyn IV started. Remained hypothermic despite Zosyn x 2 doses and Manny hugger in place (pt uncomfortable/hot and asking for removal), so added Vanc IV STAT. Checked TSH and FT4-  wnl. UA with pyuria however not a clean catch w 33 sq epi, so repeated UA. C/o "stomach" pain, overall picture with the "stomach" pain and septic picture is "exactly what led us to ICU 2 weeks ago" per daughter. +TTP RUQ, RLQ, LLQ.   Poor UOP, only 200cc by mid-shift, and still hyperNa with FWD 1.8L so started gentle hydration with D5W @ 75cc for 24 hrs. May need to diurese again after sepsis resolves. CT C/A/P noncon with pulm edema, B pleural effusions, possible PNA, no " intra-abdominal process seen.     On 4/21, Mental status much improved, however still with mild confusion and feels very tired.   decompensated further with recurrent MARQUEZ, C/o new SOB and hypoxemic 90% on 4L (over her baseline of 2L at home), improved to >94% on 6L with improvement of SOB.  C/o new L chest pressure/pain 4/10 (non-radiating), c/f MI or PE, resolved after nitro SL x 1 however HypoT with SBP upper 80s so will avoid further nitro.  HS trop 38 (decreased from prior), BNP 700s (increased from prior).CXR with pulm edema, all most c/w ADHF- Lasix 40 IV x 1 given.    Concern for untreated sepsis with ongoing hypothermia requiring jeffrey hugger despite Vanc/Zosyn for over 24 hours, so broadened to Vanc/Sher/Dinah, consulted ID, repeated BCx x 2. Lactate wnl.  Re: concern for PE, Known LIJ DVT, apixaban x several days then stopped (likely for hemorrhage of vocal fold)- discussed w ENT 4/20, ok to resume anticoag if needed. PE workup- F/u STAT UE and LE Dopplers. Unable to check CTA chest given MARQUEZ, unable to check V/Q given pulm edema. Anticoagulation with Apixaban.  Hypothermia returned however pt looks and feels the best she has in 4 days.  Voice is getting stronger.  Sitting in chair.  UCx resulted with ESBL Klebsiella, so Sher has been good coverage.  ID changed to Dapto (from Vanc), continuing Sher, for coverage of UTI and well as PNA.  BCx have all been negative.  Continuing Eliquis for DVt LUE and presumed PE.  Acute hypox resp failure 2/2 ADHF and presumed PE, still on 6L, dosing Lasix IV PRN daily given yet-unclear status of her sepsis and avoiding volume depletion in sepsis.      Prior to admit, was independent in her residence with her cat.  Two daughters very supportive.        Interval History:  Nuclear scan pending.  Patient's spoke with palliative team code status switched to DNR.  No reports of hypothermia.  No complaints at this time    Review of Systems   Unable to perform ROS: Other      Objective:     Vital Signs (Most Recent):  Temp: 98.1 °F (36.7 °C) (04/24/25 1129)  Pulse: 63 (04/24/25 1356)  Resp: 16 (04/24/25 1129)  BP: (!) 155/70 (04/24/25 1129)  SpO2: 99 % (04/24/25 1129) Vital Signs (24h Range):  Temp:  [96.8 °F (36 °C)-98.6 °F (37 °C)] 98.1 °F (36.7 °C)  Pulse:  [59-74] 63  Resp:  [16-19] 16  SpO2:  [90 %-99 %] 99 %  BP: (120-161)/(60-74) 155/70     Weight: 68.8 kg (151 lb 10.8 oz)  Body mass index is 26.04 kg/m².    Intake/Output Summary (Last 24 hours) at 4/24/2025 1520  Last data filed at 4/24/2025 1115  Gross per 24 hour   Intake --   Output 1600 ml   Net -1600 ml         Physical Exam  Constitutional:       Appearance: She is ill-appearing.   HENT:      Head: Normocephalic.      Right Ear: External ear normal.      Left Ear: External ear normal.      Nose: Nose normal.      Comments: Nasal cannula  Cardiovascular:      Rate and Rhythm: Normal rate.      Heart sounds: Normal heart sounds.   Pulmonary:      Breath sounds: Normal breath sounds.   Abdominal:      Palpations: Abdomen is soft.      Tenderness: There is no abdominal tenderness.   Musculoskeletal:      Right lower leg: No edema.      Left lower leg: No edema.   Skin:     Findings: Bruising present.   Neurological:      Mental Status: She is alert. Mental status is at baseline.               Significant Labs: All pertinent labs within the past 24 hours have been reviewed.      Assessment & Plan  Complete heart block  RBBB  Shock  -Known RBBB. Admitted with CHB and shock.  Bradycardic with HR in the 20s on arrival, transcutaneous pacing/epi initiated, admitted to CCU  - bradycardia most likely in the setting of electrolyte disturbance and abnormal thyroid function  Plan:  -s/p micra PM placement 4/10 by EP  -correct electrolyte derangements  Sepsis  Hypothermia  Cont Sher/Dapto for now  - f/u NM tagged WBC scan whole body   - f/u BCx and UCx  - f/u ID recs    4/24  Follow up nuclear studies.  Palliative Care on board.   Follow up with Infectious Disease  Cont Sher/Dapto for now  - f/u NM tagged WBC scan whole body   - f/u BCx and UCx  - f/u ID recs  Acute pulmonary embolism  Left chest pressure  Acute deep vein thrombosis (DVT) of left upper extremity  PRESUMED PE (unable to do imaging at this time as risk > benefit)  CONFIRMED LUE DVT  - Apixaban 10 BID x 7 days then 5 BID  - TTE with mild R heart strain  Hypernatremia  Very slowly uptrending Na since admission. Peak 150 on 4/17 for which pt received 250cc LR. Na 149 the following day & pt w/ increased O2 needs & LE edema. Unclear etiology as pt does not appear hypovolemic.   - encourage water PO  - Consider Nephrology consult  - Monitor BMP Q12-24H     4/24  Resolved  Dysphonia  Paralysis of right vocal cord  -Persistent hoarseness since extubation on 4/8  -ENT consulted, status post laryngoscopy 4/15 which noted right TDC paralysis with mild hemorrhage of true cord, adequate glottic closure noted  -Pt uninterested in VC augmentation at this time, needs outpatient SLP follow up  Typical atrial flutter  -s/p ablation 6/2024 without recurrence  -AC discontinued by outpatient EP provider  -Cont BB  - of note, on Eliquis for VTE (not for Afib)  CKD (chronic kidney disease) stage 3, GFR 30-59 ml/min  Creatine stable for now. BMP reviewed- noted Estimated Creatinine Clearance: 30 mL/min (based on SCr of 1.4 mg/dL). according to latest data. Based on current GFR, CKD stage is stage 4 - GFR 15-29.  Monitor UOP and serial BMP and adjust therapy as needed. Renally dose meds. Avoid nephrotoxic medications and procedures.    Centrilobular emphysema  Prior history of smoking. Continue scheduled inhalers once extubated. Antibiotics and Supplemental oxygen and monitor respiratory status closely.     Chronic respiratory failure with hypoxia  Patient with Hypoxic Respiratory failure which is Chronic.  she is on home oxygen at 2 LPM. Supplemental oxygen was provided and noted-       Signs/symptoms  "of respiratory failure include- tachypnea, increased work of breathing, use of accessory muscles, and lethargy. Contributing diagnoses includes - underlying COPD   -transition to home bumex    Acute on chronic congestive heart failure  Most recent BNP and echo results are listed below.  No results for input(s): "BNP" in the last 72 hours.     Echo  Result Date: 4/21/2025    Left Ventricle: The left ventricle is normal in size. Normal wall   thickness. There is normal systolic function with a visually estimated   ejection fraction of 55 - 60%.    Right Ventricle: The right ventricle has mild enlargement. Systolic   function is mildly reduced.    Aortic Valve: There is moderate aortic valve sclerosis. There is mild   annular calcification present. Mildly restricted motion.    Mitral Valve: There is severe mitral annular calcification.    Tricuspid Valve: There is mild regurgitation.    Pulmonary Artery: There is moderate pulmonary hypertension. The   estimated pulmonary artery systolic pressure is 51 mmHg.    IVC/SVC: Elevated venous pressure at 15 mmHg.    Left pleural effusion.      Current Heart Failure Medications: have been held due to bradycardia and MARQUEZ     Plan  - Lasix 80 IV x 1, re-dose daily after clinical eval, slow diuresis while still actively in sepsis  - HOLD home hydralazine for same reason  - cont home metop for rate control in afib  - Monitor strict I&Os and daily weights.  - Monitor electrolytes, replete PRN for goal K > 4 & Mg > 2  - Telemetry    Orthostatic hypotension  holding midodrine and per history patient had not required it since last hospitalization     Iron deficiency anemia due to chronic blood loss  Anemia is likely due to chronic disease due to Chronic Kidney Disease. Most recent hemoglobin and hematocrit are listed below.  Recent Labs     04/22/25  0450 04/23/25  0336 04/24/25  0524   HGB 11.7* 11.0* 12.0   HCT 38.6 35.1* 37.5     Plan  - Monitor serial CBC: Daily  - Transfuse PRBC " "if patient becomes hemodynamically unstable, symptomatic or H/H drops below 7/21.  - Patient has not received any PRBC transfusions to date  - Patient's anemia is currently stable    Subclinical hypothyroidism  Per Endocrine consult, "Mild, subclinical hypothyroidism - unlikely to be the cause of shock or mental status changes especially given normal fT4. Can start low dose LT4 per OGT, but in cardiac pts and elderly, better to under treat rather than over-treat -- therefore conservative/low dose LT4 recommended. Needs TSH repeated outpatient in 4wks."    Left knee pain  Appreciate Ortho consult 4/21  Cont Lido patch    Cystitis      Pleural effusion      Anticoagulated      Pulmonary hypertension      Other reduced mobility      Severe protein-calorie malnutrition  Interventions/Recommendations (treatment strategy):  1. Continue low Na diet as tolerated    - please continue to document PO % intake via flowsheets   2. Continue boost plus TID 3. Encourage good intake  4. RD to monitor weight, labs, intake, tolerance    Palliative care encounter      VTE Risk Mitigation (From admission, onward)           Ordered     apixaban tablet 5 mg  2 times daily        Placed in "Followed by" Linked Group    04/20/25 1900     apixaban tablet 10 mg  2 times daily        Placed in "Followed by" Linked Group    04/20/25 1900     IP VTE HIGH RISK PATIENT  Once         04/06/25 0512     Place sequential compression device  Until discontinued         04/06/25 0454                    Discharge Planning   NOHEMI: 4/29/2025     Code Status: DNR   Medical Readiness for Discharge Date:   Discharge Plan A: Skilled Nursing Facility   Discharge Delays: (!) Payor Issues            Please place Justification for DME        Uche Antunez MD  Department of Hospital Medicine   [facility] - Cardiology Stepdown    "

## 2025-04-24 NOTE — SUBJECTIVE & OBJECTIVE
Interval History:  Nuclear scan pending.  Patient's spoke with palliative team code status switched to DNR.  No reports of hypothermia.  No complaints at this time    Review of Systems   Unable to perform ROS: Other     Objective:     Vital Signs (Most Recent):  Temp: 98.1 °F (36.7 °C) (04/24/25 1129)  Pulse: 63 (04/24/25 1356)  Resp: 16 (04/24/25 1129)  BP: (!) 155/70 (04/24/25 1129)  SpO2: 99 % (04/24/25 1129) Vital Signs (24h Range):  Temp:  [96.8 °F (36 °C)-98.6 °F (37 °C)] 98.1 °F (36.7 °C)  Pulse:  [59-74] 63  Resp:  [16-19] 16  SpO2:  [90 %-99 %] 99 %  BP: (120-161)/(60-74) 155/70     Weight: 68.8 kg (151 lb 10.8 oz)  Body mass index is 26.04 kg/m².    Intake/Output Summary (Last 24 hours) at 4/24/2025 1520  Last data filed at 4/24/2025 1115  Gross per 24 hour   Intake --   Output 1600 ml   Net -1600 ml         Physical Exam  Constitutional:       Appearance: She is ill-appearing.   HENT:      Head: Normocephalic.      Right Ear: External ear normal.      Left Ear: External ear normal.      Nose: Nose normal.      Comments: Nasal cannula  Cardiovascular:      Rate and Rhythm: Normal rate.      Heart sounds: Normal heart sounds.   Pulmonary:      Breath sounds: Normal breath sounds.   Abdominal:      Palpations: Abdomen is soft.      Tenderness: There is no abdominal tenderness.   Musculoskeletal:      Right lower leg: No edema.      Left lower leg: No edema.   Skin:     Findings: Bruising present.   Neurological:      Mental Status: She is alert. Mental status is at baseline.               Significant Labs: All pertinent labs within the past 24 hours have been reviewed.

## 2025-04-24 NOTE — ASSESSMENT & PLAN NOTE
Cont Sher/Dapto for now  - f/u NM tagged WBC scan whole body   - f/u BCx and UCx  - f/u ID recs    4/24  Follow up nuclear studies.  Palliative Care on board.  Follow up with Infectious Disease  Cont Sher/Dapto for now  - f/u NM tagged WBC scan whole body   - f/u BCx and UCx  - f/u ID recs

## 2025-04-24 NOTE — PROGRESS NOTES
[facility] Step[facility]  Palliative Medicine  Progress Note    Patient Name: Misa Barajas  MRN: [id number]  Admission Date: 4/6/2025  Hospital Length of Stay: 18 days  Code Status: DNR   Attending Provider: Uche Antunez MD  Consulting Provider: Krystyna Hdz NP  Primary Care Physician: Isela Langston MD  Principal Problem:Complete heart block    Patient information was obtained from patient, relative(s), past medical records, and primary team.      Assessment/Plan:     Palliative Care  Palliative care encounter  Impression:  Misa Barajas is a 79 y.o. female with PMHx of COPD on 2L home O2, HFpEF, AFL s/p RFA (6/17/24 w/ Dr. Church), CKD III, HTN, HLD, chronic venous insufficiency, orthostatic hypotension who was brought via EMS after per history she was found confused and bradycardiac with HR in the 20s. Patient was percutaneously paced. During hospitalization, was intubated then extubated with residual hoarseness of voice (ENT following), required intermittent pacing by TVP, a Micra PM by EP placed 04/10, aggressive diuresing, recurrent sepsis and hypothermia, patient has required several trials of antibiotics, anticipating WBC scan on 4/25 (ID following), and presumed PE (unable to verify with CTA secondary to MARQUEZ).      Of note, patient has had 7x ED visits in the last year. Patient has engaged in goals of care discussions with Dr. Velasquez of Palliative Medicine for goals of care and symptom management. Palliative Medicine was consulted by primary, Dr. Antunez, for goals of care and advanced care planning discussions.    Patient is currently resting in bed, AAOx4, daughters (Sirisha and Esther) at bedside. Patient and family are amenable to Pal Med. Pal Med APRN and LCSW at bedside for goals of care discussions.     - Prior experience with serious illness: yes  -The patient has previously engaged in advance care planning or GOC discussions  - Insight/Understanding of illness: yes      Advance Care  Planning    Date: 04/24/2025    Code Status  In light of the patients advanced and life limiting illness, I engaged the patient and family in a voluntary conversation about the patient's preferences for care  at the very end of life. The patient wishes to have a natural, peaceful death.  Along those lines, the patient does not wish to have CPR or other invasive treatments performed when her heart and/or breathing stops. I communicated to the patient and family that a DNR order would be placed in her medical record to reflect this preference.        [facility]  I engaged the patient and family in a voluntary conversation about advance care planning and we specifically addressed what the goals of care would be moving forward, in light of the patient's change in clinical status, specifically recurrent hospitalizations over past year, hypothermia, acute renal failure, CHF exacerbation, and current hosptialization.  We did specifically address the patient's likely prognosis, which  appears to be poor .  We explored the patient's values and preferences for future care.  The patient and family endorses that what is most important right now is to focus on spending time at home, avoiding the hospital, quality of life, even if it means sacrificing a little time, and allowing additional time for infectious workup    Accordingly, we have decided that the best plan to meet the patient's goals includes continuing with treatment and continuing goals of care discussions pending infectious workup               Life Limiting Diagnosis  Acute on Chronic CHF  Acute Renal Failure on CKD stage 3b  Complete Heart Block            Symptom Management  - Pain: no  - acetaminophen 650mg q6h PRN    - Dyspnea: no  - on 2L at home  - maintained on 4L O2  - 25h SpO2: 90-99%    - Constipation: no  - senna-docusate daily PRN  - poluethylene glycol daily PRN    - Nausea: no  - ondansetron 8mg q8h    - Debility: yes  - Functional status: fair.  Pt was not  "able to manage ADLs  - Fall precautions  - PT/OT recommending moderate intensity therapy          Recommendations  - Please see above  - Continue management per primary team  - Patient and family would benefit from continued education and information regarding plan of care, prognosis, and what to expect in the future  - Most important goals at this time: completing infectious workup and obtaining more information regarding plan of care   - Code status: DNR  - Disposition: TBD        The above recommendations communicated directly to primary team on 4/24/25  Thank you for consulting Palliative Medicine.                I will follow-up with patient. Please contact us if you have any additional questions.    Subjective:     Chief Complaint:   Chief Complaint   Patient presents with    Bradycardia     Arrives via EMS hypotensive 80s/50s and bradycadic in the 20s.       HPI:   As per H&P, "Misa Barajas is a 79 y.o. female COPD on 2L home O2, HFpEF, AFL s/p RFA (6/17/24 w/ Dr. Church), CKD III, HTN, HLD, chronic venous insufficiency, orthostatic hypotension who was brought via EMS after per history she was found confused and bradycardiac with HR in the 20s.They started epi and transcutaneous pacing. Given Versed for the transcutaneous pacing. They had to bag her in route. We do not have strip of underlying rhythm. Patient obtunded for which she was intubated for airway protection on arrival to the ED and transcutaneous pacing was exchanged to transvenous pacing via RIJ (threshold 0.6). Underlying rate 44 bifascicular block. Known RBBB. Per daughters patient had called them over the phone when they noticed she appeared confused. Due to concerns for a possible syncopal episode EMS was called. Prior to event patient had been complaining of malaise and nausea for which she took about two Zofran pills per the daughters.      She had been recently seen on 3/27/25 at the [facility] visit following recent admission for AHRF 2/2 " ADHF and COPD exacerbation. On ED presentation BNP elevated to 2500, troponin elevated to 19, CXR with pulmonary edema. She was started on steroids, nebs and antibiotics in addition to IV diuresis. Updated TTE stable from prior, showing HFpEF with elevated CVP 15. Her O2 requirements improved to baseline with diuresis and she was subsequently discharged. At that visit patient had not required midodrine as blood pressure had remained stable and she was asymptomatic.      Off note, patient was taken off OAC due to bleeding issues and no recurrent of typical atrial flutter after ablation.      Overview/Hospital Course:  Admitted to CCU for CHB in s/o hyperkalemia & in the presence of conduction disease/RBBB & LAHB - stable w/ temporary wire. ??Septic shock deemed to be d/t UTI - ?? UA with only 7 WBCs); vasopressors weaned off. HTN; started NG gtt. Acute on chronic hypoxic/hypercapnic RF & obtunded; intubated. Successfully extubated 04/08. MARQUEZ on CKD; improved w/ good UOP response to diuresis. PVCs w/ intermittent pacing by TVP. Switched pip-tazo to amox-clav on 04/09; continued to improve; empiric EED 04/13 for 7 day course. Micra PM by EP placed 04/10. Medically stable for stepdown. Patient was extremely short of breath with physical therapy, chest x-ray concerning for pulmonary edema.  Started patient on IV diuresis, now euvolemic.  ENT was consulted yesterday due to hoarseness of voice.  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 Pt uninterested in any procedure at this time, ENT recommended SLP follow up.  Able to tolerate regular diet at this point.  Now agreeable for SNF, working on placement. Course c/b worsening hypoxia & hyperNa.       Overnight 4/18-4/19, decompensated with recurrent sepsis, hypothermia 92, possible PNA with infiltrates on CXR, Zosyn IV started. Remained hypothermic despite Zosyn x 2 doses and Manny hugger in place (pt uncomfortable/hot and asking  "for removal), so added Vanc IV STAT. Checked TSH and FT4-  wnl. UA with pyuria however not a clean catch w 33 sq epi, so repeated UA. C/o "stomach" pain, overall picture with the "stomach" pain and septic picture is "exactly what led us to ICU 2 weeks ago" per daughter. +TTP RUQ, RLQ, LLQ.   Poor UOP, only 200cc by mid-shift, and still hyperNa with FWD 1.8L so started gentle hydration with D5W @ 75cc for 24 hrs. May need to diurese again after sepsis resolves. CT C/A/P noncon with pulm edema, B pleural effusions, possible PNA, no intra-abdominal process seen.      On 4/21, Mental status much improved, however still with mild confusion and feels very tired.   decompensated further with recurrent MARQUEZ, C/o new SOB and hypoxemic 90% on 4L (over her baseline of 2L at home), improved to >94% on 6L with improvement of SOB.  C/o new L chest pressure/pain 4/10 (non-radiating), c/f MI or PE, resolved after nitro SL x 1 however HypoT with SBP upper 80s so will avoid further nitro.  HS trop 38 (decreased from prior), BNP 700s (increased from prior).CXR with pulm edema, all most c/w ADHF- Lasix 40 IV x 1 given.    Concern for untreated sepsis with ongoing hypothermia requiring jeffrey hugger despite Vanc/Zosyn for over 24 hours, so broadened to Vanc/Sher/Dinah, consulted ID, repeated BCx x 2. Lactate wnl.  Re: concern for PE, Known LIJ DVT, apixaban x several days then stopped (likely for hemorrhage of vocal fold)- discussed w ENT 4/20, ok to resume anticoag if needed. PE workup- F/u STAT UE and LE Dopplers. Unable to check CTA chest given MARQUEZ, unable to check V/Q given pulm edema. Anticoagulation with Apixaban.  Hypothermia returned however pt looks and feels the best she has in 4 days.  Voice is getting stronger.  Sitting in chair.  UCx resulted with ESBL Klebsiella, so Sher has been good coverage.  ID changed to Dapto (from Vanc), continuing Sher, for coverage of UTI and well as PNA.  BCx have all been negative.  Continuing " "Eliquis for DVt LUE and presumed PE.  Acute hypox resp failure 2/2 ADHF and presumed PE, still on 6L, dosing Lasix IV PRN daily given yet-unclear status of her sepsis and avoiding volume depletion in sepsis.      Prior to admit, was independent in her residence with her cat.  Two daughters very supportive.         Interval History: Pt seen and examined this morning on rounds. Hypothermia ongoing this morning, required Manny hugger.  Resolved in afternoon.  If hypothermia returns overnight, will repeat BCx x 2.  Attempting sputum sample for Cx as well, not optimistic for any yield. NM Full body tagged WBC scan per ID recs- will get injection tomorrow 4/24 then scan on 4/25.      Re-dosed Lasix at higher dose 80mg IV x 1, re-eval again tomorrow.      Pall care consulted, NOT for hospice, NOT due to a downward clinical trajectory, as pt shows signs of clinical improvement except for the hypothermia -- consult is rather for continuity and continuation of ACP/GOC started as outpt prior to admission.       Care plan reviewed. Otherwise, doing well and with no further complaints at this time."        Hospital Course:  No notes on file    Interval History: Palliative Medicine re-introduced to patient and family. Goals of care discussion in progress.     Past Medical History:   Diagnosis Date    Acute biliary pancreatitis 07/27/2024    Acute pancreatitis 07/27/2024    Acute pulmonary embolism 4/21/2025    Anemia 07/12/2024    Aortic atherosclerosis     Arthritis     knee joint pain    Asthma-COPD overlap syndrome 08/22/2022    home o2    Breast cancer 2002    left breast & lymph nodes-s/p sx with chemo    Cataracts, bilateral     Chronic diastolic heart failure 12/24/2019    Chronic kidney disease, stage 3     Class 1 obesity due to excess calories with serious comorbidity and body mass index (BMI) of 30.0 to 30.9 in adult 05/16/2024    Gram-negative bacteremia - Pasturella multocida 07/12/2024    History of chemotherapy     " last treatment 12/2002 (had 8 treatments)    Hyperlipidemia 11/20/2016    Hypertension     Lymphedema of both lower extremities 01/25/2022    Nephrolithiasis 06/02/2014    Osteoporosis     Paroxysmal atrial fibrillation 06/07/2021    Renal osteodystrophy 01/14/2016    Sepsis 4/19/2025    Severe protein-calorie malnutrition 4/23/2025    Shock 04/06/2025    Subclinical hypothyroidism 04/06/2025    Venous stasis dermatitis of both lower extremities 01/25/2022    Vitamin D insufficiency        Past Surgical History:   Procedure Laterality Date    ABLATION, ATRIAL FLUTTER, TYPICAL N/A 6/17/2024    Procedure: Ablation, Atrial Flutter, Typical;  Surgeon: Taz Church MD;  Location: [facility] EP LAB;  Service: Cardiology;  Laterality: N/A;  AFL, RFA, LORENE, MAKAYLA (cx if SR), anes, MB, 3 Prep    BREAST BIOPSY Left 2002    core bx, +    BREAST BIOPSY Right 2018    core    BREAST BIOPSY Right 2019    BREAST LUMPECTOMY Left 2002    CYSTOSCOPY  4/28/2022    Procedure: CYSTOSCOPY;  Surgeon: Vik Ware MD;  Location: [facility] OR 1ST FLR;  Service: Urology;;    CYSTOSCOPY  5/30/2022    Procedure: CYSTOSCOPY;  Surgeon: Bonnie Knutson MD;  Location: [facility] OR 1ST FLR;  Service: Urology;;    ECHOCARDIOGRAM,TRANSESOPHAGEAL N/A 6/17/2024    Procedure: Transesophageal echo (MAKAYLA) intra-procedure log documentation;  Surgeon: Nestor Martinez MD;  Location: [facility] EP LAB;  Service: Cardiology;  Laterality: N/A;    ERCP N/A 7/30/2024    Procedure: ERCP (ENDOSCOPIC RETROGRADE CHOLANGIOPANCREATOGRAPHY);  Surgeon: Sierra Cotto MD;  Location: [facility] ENDO (2ND FLR);  Service: Endoscopy;  Laterality: N/A;    IMPLANTATION OF LEADLESS PACEMAKER N/A 4/10/2025    Procedure: INSERTION, CARDIAC PACEMAKER, LEADLESS;  Surgeon: CAROLINE Solis MD;  Location: [facility] EP LAB;  Service: Cardiology;  Laterality: N/A;  CHB, MICRA leadless PPM, MDT, Anes, EH, CICU 3073    LASER LITHOTRIPSY  5/30/2022    Procedure: LITHOTRIPSY, USING LASER;  Surgeon:  Bonnie Knutson MD;  [facility];  Service: Urology;;    LITHOTRIPSY      MASTECTOMY Left 06/2002    left-& lymph node dissection    REMOVAL, PACEMAKER, TEMPORARY TRANSVENOUS  4/10/2025    Procedure: Removal, Pacemaker, Temporary Transvenous;  Surgeon: CAROLINE Solis MD;  Location: [facility] EP LAB;  Service: Cardiology;;    REPLACEMENT OF STENT Right 5/30/2022    Procedure: REPLACEMENT, STENT;  Surgeon: Bonnie Knutson MD;  [facility];  Service: Urology;  Laterality: Right;    RETROGRADE PYELOGRAPHY Right 4/28/2022    Procedure: PYELOGRAM, RETROGRADE;  Surgeon: Vik Ware MD;  [facility];  Service: Urology;  Laterality: Right;    ROBOT-ASSISTED LAPAROSCOPIC PARTIAL NEPHRECTOMY USING DA JESÚS XI Right 3/11/2021    Procedure: XI ROBOTIC NEPHRECTOMY, PARTIAL;  Surgeon: Taz Ortega MD;  Location: [facility] OR 2ND FLR;  Service: Urology;  Laterality: Right;  4hr/ gen with regional  [facility] confirmation:  [id number] for 11:15am case NC    URETEROSCOPIC REMOVAL OF URETERIC CALCULUS Right 5/30/2022    Procedure: REMOVAL, CALCULUS, URETER, URETEROSCOPIC;  Surgeon: Bonnie Knutson MD;  [facility];  Service: Urology;  Laterality: Right;    ureteroscopy Bilateral     6.14    URETEROSCOPY Right 5/30/2022    Procedure: URETEROSCOPY;  Surgeon: Bonnie Knutson MD;  [facility];  Service: Urology;  Laterality: Right;       Review of patient's allergies indicates:   Allergen Reactions    Adhesive tape-silicones     Adhesive Rash     Pt states she removed a LATEX bandaid and the skin beneath was swollen and red. No other latex causes a reaction.       Medications:  Continuous Infusions:  Scheduled Meds:   apixaban  10 mg Oral BID    Followed by    [START ON 4/27/2025] apixaban  5 mg Oral BID    DAPTOmycin (CUBICIN) IV (PEDS and ADULTS)  12 mg/kg Intravenous Q48H    levothyroxine  50 mcg Oral Before breakfast    LIDOcaine  1  patch Transdermal Q24H    meropenem (MERREM) extended infusion  1 g Intravenous Q12H    metoprolol tartrate  12.5 mg Oral BID     PRN Meds:  Current Facility-Administered Medications:     acetaminophen, 650 mg, Oral, Q6H PRN    albuterol-ipratropium, 3 mL, Nebulization, Q6H PRN    dextrose 50%, 12.5 g, Intravenous, PRN    dextrose 50%, 25 g, Intravenous, PRN    glucagon (human recombinant), 1 mg, Intramuscular, PRN    glucose, 16 g, Oral, PRN    glucose, 24 g, Oral, PRN    hydrOXYzine HCL, 25 mg, Oral, TID PRN    nitroGLYCERIN, 0.4 mg, Sublingual, Q5 Min PRN    ondansetron, 8 mg, Oral, Q8H PRN    polyethylene glycol, 17 g, Oral, BID PRN    senna, 8.6 mg, Oral, Daily PRN    simethicone, 1 tablet, Oral, TID PRN    sodium chloride, 1 spray, Each Nostril, PRN    sodium chloride 0.9%, 10 mL, Intravenous, PRN    Family History       Problem Relation (Age of Onset)    Arthritis Mother, Sister    Bone cancer Mother    Breast cancer Mother    Cancer Father    Crohn's disease Daughter    Fibroids Daughter    No Known Problems Brother, Daughter          Tobacco Use    Smoking status: Former     Current packs/day: 0.00     Average packs/day: 1 pack/day for 50.0 years (50.0 ttl pk-yrs)     Types: Cigarettes     Start date: 1960     Quit date: 5/20/2009     Years since quitting: 15.9    Smokeless tobacco: Former   Substance and Sexual Activity    Alcohol use: No    Drug use: No    Sexual activity: Not Currently     Partners: Male     Birth control/protection: Partner-Vasectomy       Review of Systems   Constitutional:  Positive for activity change and fatigue.   HENT:  Positive for voice change.    Respiratory:  Positive for shortness of breath.    Cardiovascular:  Positive for leg swelling.   Gastrointestinal:  Positive for nausea.   Neurological:  Positive for weakness.   Hematological:  Bruises/bleeds easily.     Objective:     Vital Signs (Most Recent):  Temp: 98.1 °F (36.7 °C) (04/24/25 1129)  Pulse: 63 (04/24/25  1356)  Resp: 16 (04/24/25 1129)  BP: (!) 155/70 (04/24/25 1129)  SpO2: 99 % (04/24/25 1129) Vital Signs (24h Range):  Temp:  [96.8 °F (36 °C)-98.6 °F (37 °C)] 98.1 °F (36.7 °C)  Pulse:  [59-74] 63  Resp:  [16-19] 16  SpO2:  [90 %-99 %] 99 %  BP: (120-161)/(60-74) 155/70     Weight: 68.8 kg (151 lb 10.8 oz)  Body mass index is 26.04 kg/m².       Physical Exam  Vitals and nursing note reviewed.   Constitutional:       General: She is not in acute distress.     Appearance: She is ill-appearing.   HENT:      Head: Normocephalic and atraumatic.      Nose: Nose normal.      Mouth/Throat:      Mouth: Mucous membranes are dry.   Eyes:      Extraocular Movements: Extraocular movements intact.   Cardiovascular:      Rate and Rhythm: Normal rate.   Pulmonary:      Effort: Pulmonary effort is normal.   Skin:     General: Skin is warm and dry.   Neurological:      Mental Status: She is alert and oriented to person, place, and time.      Motor: Weakness present.   Psychiatric:         Mood and Affect: Mood normal.         Behavior: Behavior normal.         Thought Content: Thought content normal.         Judgment: Judgment normal.            Review of Symptoms      Symptom Assessment (ESAS 0-10 Scale)  Pain:  0  Dyspnea:  0  Anxiety:  0  Nausea:  0  Depression:  0  Anorexia:  0  Fatigue:  0  Insomnia:  0  Restlessness:  0  Agitation:  0     CAM / Delirium:  Negative  Constipation:  Negative  Diarrhea:  Negative      Modified Berto Scale:  0    Performance Status:  50    Psychosocial/Cultural:   See Palliative Psychosocial Note: Yes  - Prior to patient's hospitalizations, she was living alone. Daughter lived in neighborhood and assisted patient with cleaning and cooking  - Over the last year, patient/daughters report patient has spent more time in the hospital/rehab than at home.   - Patient states he enjoys sitting on her porch, gardening in her garden pots, and spending time with her cat.   - Patient's primary support system  includes her 2 daughters, Sirisha and Esther.  **Primary  to Follow**  Palliative Care  Consult: Yes    Spiritual:  F - Megan and Belief:  Yazidism        Advance Care Planning  Advance Directives:   Living Will: Yes        Copy on chart: Yes    LaPOST: No    Do Not Resuscitate Status: Yes    Medical Power of : Yes    Agent's Name:  Esther Barajas   Agent's Contact Number:  [phone]    Decision Making:  Patient answered questions and Family answered questions  Goals of Care: The patient endorses that what is most important right now is to focus on spending time at home, remaining as independent as possible, quality of life, even if it means sacrificing a little time, and continuing infectious workup    Accordingly, we have decided that the best plan to meet the patient's goals includes continuing with treatment and allowing for additional time for goals of care discussions           Significant Labs: All pertinent labs within the past 24 hours have been reviewed.  CBC:   Recent Labs   Lab 04/24/25 0524   WBC 8.10   HGB 12.0   HCT 37.5   MCV 91        BMP:  Recent Labs   Lab 04/24/25 0524      K 3.7   *   CO2 27   BUN 46*   CREATININE 1.4   CALCIUM 9.9   MG 2.1     LFT:  Lab Results   Component Value Date    AST 33 04/24/2025    ALKPHOS 112 04/24/2025    BILITOT 0.4 04/24/2025     Albumin:   Albumin   Date Value Ref Range Status   04/24/2025 2.0 (L) 3.5 - 5.2 g/dL Final   02/18/2025 2.8 (L) 3.5 - 5.2 g/dL Final     Protein:   Total Protein   Date Value Ref Range Status   02/18/2025 6.0 6.0 - 8.4 g/dL Final     Lactic acid:   Lab Results   Component Value Date    LACTATE 1.6 04/21/2025    LACTATE 0.8 04/20/2025       Significant Imaging: I have reviewed all pertinent imaging results/findings within the past 24 hours.    I spent a total of 75 minutes on the day of the visit. This includes face to face time in discussion of goals of care, symptom assessment,  coordination of care and emotional support. This also includes non-face to face time preparing to see the patient (eg, review of tests/imaging), obtaining and/or reviewing separately obtained history, documenting clinical information in the electronic or other health record, independently interpreting results and communicating results to the patient/family/caregiver, or care coordinator.       Additional 46min time spent on a voluntary advance care planning and /or goals of care discussion, providing emotional support, formulating, and communicating prognosis and exploring burden/benefit of various approaches of treatment.     Krystyna Hdz NP  Palliative Medicine  [facility] - [facility] Stepdown

## 2025-04-24 NOTE — ASSESSMENT & PLAN NOTE
"Most recent BNP and echo results are listed below.  No results for input(s): "BNP" in the last 72 hours.     Echo  Result Date: 4/21/2025    Left Ventricle: The left ventricle is normal in size. Normal wall   thickness. There is normal systolic function with a visually estimated   ejection fraction of 55 - 60%.    Right Ventricle: The right ventricle has mild enlargement. Systolic   function is mildly reduced.    Aortic Valve: There is moderate aortic valve sclerosis. There is mild   annular calcification present. Mildly restricted motion.    Mitral Valve: There is severe mitral annular calcification.    Tricuspid Valve: There is mild regurgitation.    Pulmonary Artery: There is moderate pulmonary hypertension. The   estimated pulmonary artery systolic pressure is 51 mmHg.    IVC/SVC: Elevated venous pressure at 15 mmHg.    Left pleural effusion.      Current Heart Failure Medications: have been held due to bradycardia and MARQUEZ     Plan  - Lasix 80 IV x 1, re-dose daily after clinical eval, slow diuresis while still actively in sepsis  - HOLD home hydralazine for same reason  - cont home metop for rate control in afib  - Monitor strict I&Os and daily weights.  - Monitor electrolytes, replete PRN for goal K > 4 & Mg > 2  - Telemetry    "

## 2025-04-24 NOTE — PLAN OF CARE
Advance Care Planning   [facility] - Cardiology Stepdown  Palliative Care   Psychosocial Assessment    Patient Name: Misa Barajas  MRN: [id number]  Admission Date: 4/6/2025  Hospital Length of Stay: 18 days  Code Status: DNR   Attending Provider: Uche Antunez MD  Palliative Care Provider:   Primary Care Physician: Isela Langston MD  Principal Problem:Complete heart block    Reason for Referral: assistance with clarification of goals of care  Consult Order Date:   Primary CM/SW:    Present during Interview: patient, relative(s), past medical records, and ER records.      Primary Language:English   Needed: no      Past Medical Situation:   PMH:   Past Medical History:   Diagnosis Date    Acute biliary pancreatitis 07/27/2024    Acute pancreatitis 07/27/2024    Acute pulmonary embolism 4/21/2025    Anemia 07/12/2024    Aortic atherosclerosis     Arthritis     knee joint pain    Asthma-COPD overlap syndrome 08/22/2022    home o2    Breast cancer 2002    left breast & lymph nodes-s/p sx with chemo    Cataracts, bilateral     Chronic diastolic heart failure 12/24/2019    Chronic kidney disease, stage 3     Class 1 obesity due to excess calories with serious comorbidity and body mass index (BMI) of 30.0 to 30.9 in adult 05/16/2024    Gram-negative bacteremia - Pasturella multocida 07/12/2024    History of chemotherapy     last treatment 12/2002 (had 8 treatments)    Hyperlipidemia 11/20/2016    Hypertension     Lymphedema of both lower extremities 01/25/2022    Nephrolithiasis 06/02/2014    Osteoporosis     Paroxysmal atrial fibrillation 06/07/2021    Renal osteodystrophy 01/14/2016    Sepsis 4/19/2025    Severe protein-calorie malnutrition 4/23/2025    Shock 04/06/2025    Subclinical hypothyroidism 04/06/2025    Venous stasis dermatitis of both lower extremities 01/25/2022    Vitamin D insufficiency      Mental Health/Substance Use History: none disclosed   Risk of Abuse, neglect or  exploitation: none identified   Current or Previous Trauma and/or evidence of PTSD: none disclosed   Non-traditional Health practices: none disclosed     Understanding of diagnosis and prognosis: need more education regarding source of infection and potential options for treatment  Experience/Comfort level with health care system: no issues identified     Patients Mental Status: AAOx3    Socio-Economic Factors/Resources:  Address: [facility] [place] [address]  Phone Number: [phone] (home)     Marital Status: Single  Household composition: pt lives alone with her cat, Romeo   Children: two dtrs, Iqra and Esther     Patient/Family perceptions about Caregiving Needs; availability and capacity: pt/family aware of increased cg needs.     Family Structure, Dynamics/Relationships: pt has good relationship with family.    Patterns/Styles of Communication and Decision-making in the Family: pt is decisional. Dtrs make decisions on her behalf when she is unable to do so.    Patient/Family Coping: appropriate     Activities of Daily Living: pt was mostly independent prior to admit, and living at home alone   Support Systems-Family & Community (Home Health, HME etc): TBD     Transportation:  yes    Work/Education History: retired  Self-Care Activities/Hobbies: gardening, sitting outside, her cat      History: no    Financial Resources:Medicare      Advanced Care Planning & Legal Concerns:   Advanced Directives/Living Will: no  [place]POST/POLST: no   Planning:  no    Medical Power of : no     Oral/Written Declaration: no  Witnesses:   Surrogate Decision Maker: dtrs, collectively     Emergency Contacts:    Spirituality, Culture & Coping Mechanisms:  F- Mgean and Belief: Buddhist     I - Importance: not discussed today     C - Community/Culture Values: not discussed today      A - Address in Care:  yes      Goals/Hopes/Expectations: to be able to go home   Fears/Anxiety/Concerns: further  decline/decompensation         Preferences about EOL Environment: (own bed, family nearby, pets, music, etc): home      Complicated Bereavement Risk Assessment Tool (CBRAT)  Reference:  [facility] Clinical Practice Group (May 2016). Bereavement Risk Screening and Management Guidelines.  Retrieved from: http://www.[employer].com.au/wp-content/uploads//FXXNJ-Iqzsdwjkvcv-Yvlyhdhtp-and-Management-Guideline-2016.pdf      Bereaved Client Characteristics   Under 18      no  Was a Twin   no  Young Spouse   no  Elderly Spouse    no  Isolated    no  Lacks Meaningful Social Support   no  Dissatisfied with help available during illness   no  New to Financial [place] no  New to Decision-Making   no    Illness  Inherited Disorder   no  Stigmatized Disease in the family/community   no  Lengthy/Burdensome   yes     Bereaved Client's History of Loss   Cumulative Multiple Losses   no  Previous Mental Health Illnesses   no  Current Mental Health Illness   no  Other Significant Health Issues   no   Migrant/Refugee   no Will the Death be:  Sudden or Unexpected   no  Traumatic Circumstances Associated with Death   no  Significant Cultural/Social Burdens as a result of Death   no   Relationship with   Profound Lifelong Partner   no  Highly Dependent    no  Antagonistic   no  Ambivalent   no  Deeply Connected   yes  Culturally Defined   yes   Risk Factors Scores  0-2  Low  3-5  Moderate  5+  High  All persons scoring moderate to high presume to be at risk**    (** It is acknowledged that protective factors and resilience may outweigh apparent risk factors.      Total Risk Factors Score:   3, moderate     Advance Care Planning     Date: 2025    [facility]  I engaged the patient and family in a voluntary conversation about advance care planning and we specifically addressed what the goals of care would be moving forward, in light of the patient's change in clinical status,  "specifically complete heart block.  We did not specifically address the patient's likely prognosis, which is  guarded .  We explored the patient's values and preferences for future care.  The patient and family endorses that what is most important right now is to focus on spending time at home, avoiding the hospital, remaining as independent as possible, symptom/pain control, and extending life as long as possible, even it it means sacrificing quality.     Accordingly, we have decided that the best plan to meet the patient's goals includes continuing with treatment.    A total of 60 min was spent on advance care planning, goals of care discussion, emotional support, formulating and communicating prognosis and exploring burden/benefit of various approaches of treatment. This discussion occurred on a fully voluntary basis with the verbal consent of the patient and/or family.        Discharge Planning Needs/Plan of Care:     МАРИНА, along with RAYMOND MITCHELL, met with pt and two dtrs at bedside. Pt AAOx3, in fair spirits. Pt c/o lethargy and overall states that she "is tired" from all of the recurrent hospitalizations she has had over the last year. Pt and family recognize that pt has been on a decline, and are worried that she may not be able to live at home alone anymore. Pt/family relay that they are confused as to why people has been having good days, getting close to discharge, and then having a series of very bad days. They understand that pt is septic again, but there is currently no known source. They know pt is going for a scan in nuclear med tomorrow in attempts to find a source of infection. Thus, there are still missing pieces that are needed in order to make plans for the future. Discussion had regarding code status, and pt/family amenable to DNR. Support provided. Will remain available for needs.     Shyanne Urbano, МАРИНА-BACS, [facility]-  Department of Palliative Medicine                 "

## 2025-04-25 NOTE — SUBJECTIVE & OBJECTIVE
Interval History:  Patient to have 2nd part of nuclear study done today.  No complaints or acute issues.  Temperature Remains normal, tomorrow last day of IV meropenem.    Review of Systems   Unable to perform ROS: Other     Objective:     Vital Signs (Most Recent):  Temp: 98.4 °F (36.9 °C) (04/25/25 0757)  Pulse: 71 (04/25/25 0908)  Resp: 18 (04/25/25 0757)  BP: (!) 179/76 (04/25/25 0908)  SpO2: (!) 94 % (04/25/25 0757) Vital Signs (24h Range):  Temp:  [97.4 °F (36.3 °C)-98.4 °F (36.9 °C)] 98.4 °F (36.9 °C)  Pulse:  [63-83] 71  Resp:  [16-18] 18  SpO2:  [92 %-99 %] 94 %  BP: (144-179)/(62-76) 179/76     Weight: 68.8 kg (151 lb 10.8 oz)  Body mass index is 26.04 kg/m².    Intake/Output Summary (Last 24 hours) at 4/25/2025 0953  Last data filed at 4/25/2025 0501  Gross per 24 hour   Intake --   Output 1400 ml   Net -1400 ml         Physical Exam  Constitutional:       Appearance: She is ill-appearing.   HENT:      Head: Normocephalic.      Right Ear: External ear normal.      Left Ear: External ear normal.      Nose: Nose normal.      Comments: Nasal cannula  Cardiovascular:      Rate and Rhythm: Normal rate.      Heart sounds: Normal heart sounds.   Pulmonary:      Breath sounds: Normal breath sounds.   Abdominal:      Palpations: Abdomen is soft.      Tenderness: There is no abdominal tenderness.   Musculoskeletal:      Right lower leg: No edema.      Left lower leg: No edema.   Skin:     Findings: Bruising present.   Neurological:      Mental Status: She is alert. Mental status is at baseline.

## 2025-04-25 NOTE — PT/OT/SLP PROGRESS
"Occupational Therapy  Co -  Treatment with PT    Co-evaluation/treatment performed due to patient's multiple deficits requiring two skilled therapists to appropriately and safely assess patient's strength and endurance while facilitating functional tasks in addition to accommodating for patient's activity tolerance.       Name: Misa Barajas  MRN: [id number]  Admitting Diagnosis:  Complete heart block  15 Days Post-Op    Recommendations:     Discharge Recommendations: Moderate Intensity Therapy  Discharge Equipment Recommendations:  walker, rolling  Barriers to discharge:  Decreased caregiver support (increased skilled A needed)    Assessment:     Misa Barajas is a 79 y.o. female with a medical diagnosis of Complete heart block.  She presents with performance deficits affecting function are weakness, impaired endurance, impaired self care skills, impaired functional mobility, gait instability, impaired balance, decreased safety awareness, impaired cognition, impaired cardiopulmonary response to activity.     Pt encountered with HOB raised, pleasant demeanor and agreeable to therapy with encouragement. Pt able to complete bed mobility with light assistance, STS transfer with mod A - min A of 2 persons/RW across 2 trials. Pt able to take ~3 side steps toward HOB with min A/RW.  After each stand, pt required increased time to catch her breath and requested the fan, both times pt's pulse O2 did not decrease below 92%. Pt on pathway to receive post acute therapy at low level of mod intensity.     Rehab Prognosis:  Good; patient would benefit from acute skilled OT services to address these deficits and reach maximum level of function.       Plan:     Patient to be seen 4 x/week to address the above listed problems via self-care/home management, therapeutic activities, therapeutic exercises  Plan of Care Expires: 05/13/25  Plan of Care Reviewed with: patient    Subjective     Chief Complaint: "I'm good" "   Patient/Family Comments/goals: Get better, return home   Pain/Comfort:  Pain Rating 1: 0/10  Pain Rating Post-Intervention 1: 0/10    Objective:     Communicated with: RN prior to session.  Patient found HOB elevated with telemetry, PureWick, peripheral IV, oxygen upon OT entry to room.    General Precautions: Standard, fall    Orthopedic Precautions:N/A  Braces: N/A  Respiratory Status: Nasal cannula, flow 3 L/min     Occupational Performance:     Bed Mobility:    Patient completed Rolling/Turning to Left with  minimum assistance  Patient completed Rolling/Turning to Right with minimum assistance  Patient completed Scooting/Bridging with minimum assistance  Patient completed Supine to Sit with minimum assistance  Patient completed Sit to Supine with minimum assistance   Pt able to sit EOB with good upright balance, supervision     Functional Mobility/Transfers + Neuromuscular Re-education:   Patient completed Sit <> Stand Transfer:   From EOB with moderate assistance and of 2 persons  with  rolling walker   From EOB with min A of 2 persons with RW  Functional Mobility: Pt able to take ~3 ALKA toward HOB with min A/RW and multiple cues for upright posture @ hips and shoulders    Activities of Daily Living:  Upper Body Dressing: contact guard assistance donning/doffing 2nd gown as robe  Lower Body Dressing: moderate assistance donning bilateral nonslip socks   Toileting: OT/PT managed purewick to ensure good fit after stand attempt, noted Bm and informed RN        [facility] 6 Click ADL: 12    Treatment & Education:  Pt educated on role of OT, POC, and goals for therapy.    POC was dicussed with patient/caregiver, who was included in its development and is in agreement with the identified goals and treatment plan.   Patient and family aware of patient's deficits and therapy progression.   Time provided for therapeutic counseling and discussion of health disposition.   Educated on importance of EOB/OOB mobility,  maintaining routine, sitting up in chair, and maximizing independence with ADLs during admission   Pt completed ADLs and functional mobility for treatment session as noted above   Pt/caregiver verbalized understanding and expressed no further concerns/questions.  Updated communication board with level of assist required       Patient left HOB elevated with all lines intact, call button in reach, and RN notified    GOALS:   Multidisciplinary Problems       Occupational Therapy Goals          Problem: Occupational Therapy    Goal Priority Disciplines Outcome Interventions   Occupational Therapy Goal     OT, PT/OT Progressing    Description: Goals to be met by: 5/13/2025     Patient will increase functional independence with ADLs by performing:    UE Dressing with Minimal Assistance.  LE Dressing with Moderate Assistance.  Grooming while standing at sink with Contact Guard Assistance.  Toileting from bedside commode with Minimal Assistance for hygiene and clothing management.   Step transfer with Moderate Assistance  Toilet transfer to bedside commode with Moderate Assistance.                         DME Justifications:  No DME recommended requiring DME justifications    Time Tracking:     OT Date of Treatment: 04/25/25  OT Start Time: 1307  OT Stop Time: 1330  OT Total Time (min): 23 min    Billable Minutes:Therapeutic Activity 15  Neuromuscular Re-education 8    OT/MARYA: OT     Number of MARYA visits since last OT visit: 0    4/25/2025

## 2025-04-25 NOTE — PLAN OF CARE
[facility] Cardiology Stepdown  Discharge Reassessment    Primary Care Provider: Isela Langston MD    Expected Discharge Date: 4/29/2025    Reassessment (most recent)       Discharge Reassessment - 04/25/25 1335          Discharge Reassessment    Assessment Type Discharge Planning Reassessment     Did the patient's condition or plan change since previous assessment? Yes     Discharge Plan discussed with: Patient;Adult children     Communicated NOHEMI with patient/caregiver Date not available/Unable to determine     Discharge Plan A Skilled Nursing Facility     Discharge Plan B Home Health     DME Needed Upon Discharge  none     Transition of Care Barriers None     Why the patient remains in the hospital Requires continued medical care        Post-Acute Status    Post-Acute Authorization Placement     Post-Acute Placement Status Pending medical clearance/testing                   Pt still getting worked-up for infection so not ready for discharge.  Plan to discharge to [facility] for SNF when medically stable.  Discharge Plan A and Plan B have been determined by review of patient's clinical status, future medical and therapeutic needs, and coverage/benefits for post-acute care in coordination with multidisciplinary team members.  Will continue to follow.      Karey Castelan LMSW  [facility]  [id number]

## 2025-04-25 NOTE — PT/OT/SLP PROGRESS
Physical Therapy Treatment    Patient Name:  Misa Barajas   MRN:  [id number]    Recommendations:     Discharge Recommendations: Moderate Intensity Therapy  Discharge Equipment Recommendations: walker, rolling  Barriers to discharge: Decreased caregiver support    Assessment:     Misa Barajas is a 79 y.o. female admitted with a medical diagnosis of Complete heart block.  She presents with the following impairments/functional limitations: weakness, impaired endurance, impaired self care skills, impaired functional mobility, gait instability, impaired cardiopulmonary response to activity.    Rehab Prognosis: Fair; patient would benefit from acute skilled PT services to address these deficits and reach maximum level of function.    Recent Surgery: Procedure(s) (LRB):  INSERTION, CARDIAC PACEMAKER, LEADLESS (N/A)  Removal, Pacemaker, Temporary Transvenous 15 Days Post-Op    Plan:     During this hospitalization, patient to be seen 4 x/week to address the identified rehab impairments via gait training, therapeutic activities, therapeutic exercises, neuromuscular re-education and progress toward the following goals:    Plan of Care Expires:  05/13/25    Subjective     Chief Complaint: SOB  Patient/Family Comments/goals: to get better   Pain/Comfort:  Pain Rating 1: 0/10      Objective:     Communicated with RN prior to session.  Patient found HOB elevated with telemetry, oxygen, PureWick, peripheral IV upon PT entry to room.     General Precautions: Standard, fall  Orthopedic Precautions: N/A  Braces: N/A  Respiratory Status:      Functional Mobility:  Bed Mobility:     Supine to Sit: minimum assistance  Sit to Supine: minimum assistance  Transfers:     Sit to Stand:  minimum assistance, moderate assistance, and of 2 persons with rolling walker  Gait: 3 steps with RW and min A with decreased step length, decreased step clearance.       AM-PAC 6 CLICK MOBILITY  Turning over in bed (including adjusting bedclothes,  sheets and blankets)?: 3  Sitting down on and standing up from a chair with arms (e.g., wheelchair, bedside commode, etc.): 3  Moving from lying on back to sitting on the side of the bed?: 3  Moving to and from a bed to a chair (including a wheelchair)?: 2  Need to walk in hospital room?: 2  Climbing 3-5 steps with a railing?: 1  Basic Mobility Total Score: 14       Treatment & Education:  Bedside table in front of patient and area set up for function, convenience, and safety. RN aware of patient's mobility needs and status. Questions/concerns addressed within PTA scope of practice; patient  with no further questions. Time was provided for active listening, discussion of health disposition, and discussion of safe discharge.    Patient left HOB elevated with all lines intact and call button in reach..    GOALS:   Multidisciplinary Problems       Physical Therapy Goals          Problem: Physical Therapy    Goal Priority Disciplines Outcome Interventions   Physical Therapy Goal     PT, PT/OT Progressing    Description: PT goals until 5/13/25    1. Pt supine to sit with CGA - met 4/22/2025  Updated: supervision  2. Pt sit to supine with CGA-not met  3. Pt sit to stand with RW with CGA-not met  4. Pt to perform gait 10ft with RW with CGA.-not met  5. Pt to transfer bed to/from bedside chair with HHA with minimal assist.-not met  6. Pt to up/down 1 step with RW with minimal assist.-not met  7. Pt to perform B LE exs in sitting or supine x 10 reps to strengthen B LE to improve functional mobility.-not met                         DME Justifications:   Misa's mobility limitation cannot be sufficiently resolved by the use of a cane. Her functional mobility deficit can be sufficiently resolved with the use of a Rolling Walker. Patient's mobility limitation significantly impairs their ability to participate in one of more activities of daily living.  The use of a RW will significantly improve the patient's ability to  participate in MRADLS and the patient will use it on regular basis in the home.    Time Tracking:     PT Received On: 04/25/25  PT Start Time: 1307     PT Stop Time: 1330  PT Total Time (min): 23 min     Billable Minutes: Gait Training 13 and Therapeutic Activity 10    Treatment Type: Treatment  PT/PTA: PTA     Number of PTA visits since last PT visit: 2     04/25/2025

## 2025-04-25 NOTE — ASSESSMENT & PLAN NOTE
Impression:  Misa Barajas is a 79 y.o. female with PMHx of COPD on 2L home O2, HFpEF, AFL s/p RFA (6/17/24 w/ Dr. Church), CKD III, HTN, HLD, chronic venous insufficiency, orthostatic hypotension who was brought via EMS after per history she was found confused and bradycardiac with HR in the 20s. Patient was percutaneously paced. During hospitalization, was intubated then extubated with residual hoarseness of voice (ENT following), required intermittent pacing by TVP, a Micra PM by EP placed 04/10, aggressive diuresing, recurrent sepsis and hypothermia, patient has required several trials of antibiotics, anticipating WBC scan on 4/25 (ID following), and presumed PE (unable to verify with CTA secondary to MARQUEZ).      Of note, patient has had 7x ED visits in the last year. Patient has engaged in goals of care discussions with Dr. Velasquez of Palliative Medicine for goals of care and symptom management. Palliative Medicine was consulted by primary, Dr. Antunez, for goals of care and advanced care planning discussions.    Patient is currently resting in bed, AAOx4, daughters (Sirisha and Esther) at bedside. Patient and family are amenable to Pal Med. Pal Med APRN and LCSW at bedside for goals of care discussions.     - Prior experience with serious illness: yes  -The patient has previously engaged in advance care planning or GOC discussions  - Insight/Understanding of illness: yes      Advance Care Planning     Date: 04/25/2025  - No acute events overnight. Patient anticipates NM Inflammatory Whole Body scan today.  - Revisited goals of care discussion from yesterday. Patient acknowledged it was a difficult conversation to have, but has been open to it. As we anticipate more information from the pending studies, goals of care discussions are also pending. Patient expressed concerns regarding nuclear scan and when it will be getting done. Patient states she thought it would have been done by now. I followed up with radiology  to inquire about scan time, and updated patient on the 1-3pm time frame that radiology hopes to see patient. Patient's daughter Esther was at bedside and updated.   - Patient with no needs at this time.  - Primary team updated.        Date: 04/24/2025    Code Status  In light of the patients advanced and life limiting illness, I engaged the patient and family in a voluntary conversation about the patient's preferences for care  at the very end of life. The patient wishes to have a natural, peaceful death.  Along those lines, the patient does not wish to have CPR or other invasive treatments performed when her heart and/or breathing stops. I communicated to the patient and family that a DNR order would be placed in her medical record to reflect this preference.        [facility]  I engaged the patient and family in a voluntary conversation about advance care planning and we specifically addressed what the goals of care would be moving forward, in light of the patient's change in clinical status, specifically recurrent hospitalizations over past year, hypothermia, acute renal failure, CHF exacerbation, and current hosptialization.  We did specifically address the patient's likely prognosis, which  appears to be poor .  We explored the patient's values and preferences for future care.  The patient and family endorses that what is most important right now is to focus on spending time at home, avoiding the hospital, quality of life, even if it means sacrificing a little time, and allowing additional time for infectious workup    Accordingly, we have decided that the best plan to meet the patient's goals includes continuing with treatment and continuing goals of care discussions pending infectious workup               Life Limiting Diagnosis  Acute on Chronic CHF  Acute Renal Failure on CKD stage 3b  Complete Heart Block            Symptom Management  - Pain: no  - acetaminophen 650mg q6h PRN    - Dyspnea: no  - on 2L at  home  - maintained on 4L O2  - 25h SpO2: 90-99%    - Constipation: no  - senna-docusate daily PRN  - poluethylene glycol daily PRN    - Nausea: no  - ondansetron 8mg q8h    - Debility: yes  - Functional status: fair.  Pt was not able to manage ADLs  - Fall precautions  - PT/OT recommending moderate intensity therapy          Recommendations  - Please see above  - Continue management per primary team  - Patient and family would benefit from continued education and information regarding plan of care, prognosis, and what to expect in the future  - Most important goals at this time: completing infectious workup and obtaining more information regarding plan of care   - Code status: DNR  - Disposition: TBD        The above recommendations communicated directly to primary team on 4/25/25  Thank you for consulting Palliative Medicine.

## 2025-04-25 NOTE — ASSESSMENT & PLAN NOTE
Anemia is likely due to chronic disease due to Chronic Kidney Disease. Most recent hemoglobin and hematocrit are listed below.  Recent Labs     04/23/25  0336 04/24/25  0524   HGB 11.0* 12.0   HCT 35.1* 37.5     Plan  - Monitor serial CBC: Daily  - Transfuse PRBC if patient becomes hemodynamically unstable, symptomatic or H/H drops below 7/21.  - Patient has not received any PRBC transfusions to date  - Patient's anemia is currently stable

## 2025-04-25 NOTE — SUBJECTIVE & OBJECTIVE
Interval History: No acute events over night. Patient pending nuclear study. Goals of care discussions pending study completion and treatment option discussions    Past Medical History:   Diagnosis Date    Acute biliary pancreatitis 07/27/2024    Acute pancreatitis 07/27/2024    Acute pulmonary embolism 4/21/2025    Anemia 07/12/2024    Aortic atherosclerosis     Arthritis     knee joint pain    Asthma-COPD overlap syndrome 08/22/2022    home o2    Breast cancer 2002    left breast & lymph nodes-s/p sx with chemo    Cataracts, bilateral     Chronic diastolic heart failure 12/24/2019    Chronic kidney disease, stage 3     Class 1 obesity due to excess calories with serious comorbidity and body mass index (BMI) of 30.0 to 30.9 in adult 05/16/2024    Gram-negative bacteremia - Pasturella multocida 07/12/2024    History of chemotherapy     last treatment 12/2002 (had 8 treatments)    Hyperlipidemia 11/20/2016    Hypertension     Lymphedema of both lower extremities 01/25/2022    Nephrolithiasis 06/02/2014    Osteoporosis     Paroxysmal atrial fibrillation 06/07/2021    Renal osteodystrophy 01/14/2016    Sepsis 4/19/2025    Severe protein-calorie malnutrition 4/23/2025    Shock 04/06/2025    Subclinical hypothyroidism 04/06/2025    Venous stasis dermatitis of both lower extremities 01/25/2022    Vitamin D insufficiency        Past Surgical History:   Procedure Laterality Date    ABLATION, ATRIAL FLUTTER, TYPICAL N/A 6/17/2024    Procedure: Ablation, Atrial Flutter, Typical;  Surgeon: Taz Church MD;  Location: [facility] EP LAB;  Service: Cardiology;  Laterality: N/A;  AFL, RFA, LORENE, MAKAYLA (cx if SR), LEANNE miller, 3 Prep    BREAST BIOPSY Left 2002    core bx, +    BREAST BIOPSY Right 2018    core    BREAST BIOPSY Right 2019    BREAST LUMPECTOMY Left 2002    CYSTOSCOPY  4/28/2022    Procedure: CYSTOSCOPY;  Surgeon: Vik Ware MD;  [facility];  Service: Urology;;    CYSTOSCOPY  5/30/2022     Procedure: CYSTOSCOPY;  Surgeon: Bonnie Knutson MD;  Location: [facility] OR [facility] FLR;  Service: Urology;;    ECHOCARDIOGRAM,TRANSESOPHAGEAL N/A 6/17/2024    Procedure: Transesophageal echo (MAKAYLA) intra-procedure log documentation;  Surgeon: Nestor Martinez MD;  Location: [facility] EP LAB;  Service: Cardiology;  Laterality: N/A;    ERCP N/A 7/30/2024    Procedure: ERCP (ENDOSCOPIC RETROGRADE CHOLANGIOPANCREATOGRAPHY);  Surgeon: Sierra Cotto MD;  Location: [facility] ENDO (2ND FLR);  Service: Endoscopy;  Laterality: N/A;    IMPLANTATION OF LEADLESS PACEMAKER N/A 4/10/2025    Procedure: INSERTION, CARDIAC PACEMAKER, LEADLESS;  Surgeon: CAROLINE Solis MD;  Location: [facility] EP LAB;  Service: Cardiology;  Laterality: N/A;  CHB, MICRA leadless PPM, MDT, Anes, EH, CICU 3073    LASER LITHOTRIPSY  5/30/2022    Procedure: LITHOTRIPSY, USING LASER;  Surgeon: Bonnie Knutson MD;  [facility];  Service: Urology;;    LITHOTRIPSY      MASTECTOMY Left 06/2002    left-& lymph node dissection    REMOVAL, PACEMAKER, TEMPORARY TRANSVENOUS  4/10/2025    Procedure: Removal, Pacemaker, Temporary Transvenous;  Surgeon: CAROLINE Solis MD;  Location: [facility] EP LAB;  Service: Cardiology;;    REPLACEMENT OF STENT Right 5/30/2022    Procedure: REPLACEMENT, STENT;  Surgeon: Bonnie Knutson MD;  [facility];  Service: Urology;  Laterality: Right;    RETROGRADE PYELOGRAPHY Right 4/28/2022    Procedure: PYELOGRAM, RETROGRADE;  Surgeon: Vik Ware MD;  Location: [facility] OR 1ST FLR;  Service: Urology;  Laterality: Right;    ROBOT-ASSISTED LAPAROSCOPIC PARTIAL NEPHRECTOMY USING DA JESÚS XI Right 3/11/2021    Procedure: XI ROBOTIC NEPHRECTOMY, PARTIAL;  Surgeon: Taz Ortega MD;  Location: [facility] OR 2ND FLR;  Service: Urology;  Laterality: Right;  4hr/ gen with regional  [facility] confirmation:  [id number] for 11:15am case NC    URETEROSCOPIC REMOVAL OF URETERIC CALCULUS Right 5/30/2022    Procedure:  REMOVAL, CALCULUS, URETER, URETEROSCOPIC;  Surgeon: Bonnie Knutson MD;  Location: [facility] OR [address] [place] [address];  Service: Urology;  Laterality: Right;    ureteroscopy Bilateral     6.14    URETEROSCOPY Right 5/30/2022    Procedure: URETEROSCOPY;  Surgeon: Bonnie Knutson MD;  Location: [facility] OR [address] [place] [address];  Service: Urology;  Laterality: Right;       Review of patient's allergies indicates:   Allergen Reactions    Adhesive tape-silicones     Adhesive Rash     Pt states she removed a LATEX bandaid and the skin beneath was swollen and red. No other latex causes a reaction.       Medications:  Continuous Infusions:  Scheduled Meds:   apixaban  10 mg Oral BID    Followed by    [START ON 4/27/2025] apixaban  5 mg Oral BID    DAPTOmycin (CUBICIN) IV (PEDS and ADULTS)  12 mg/kg Intravenous Q24H    levothyroxine  50 mcg Oral Before breakfast    LIDOcaine  1 patch Transdermal Q24H    meropenem (MERREM) extended infusion  1 g Intravenous Q12H    metoprolol tartrate  12.5 mg Oral BID     PRN Meds:  Current Facility-Administered Medications:     acetaminophen, 650 mg, Oral, Q6H PRN    albuterol-ipratropium, 3 mL, Nebulization, Q6H PRN    dextrose 50%, 12.5 g, Intravenous, PRN    dextrose 50%, 25 g, Intravenous, PRN    glucagon (human recombinant), 1 mg, Intramuscular, PRN    glucose, 16 g, Oral, PRN    glucose, 24 g, Oral, PRN    hydrOXYzine HCL, 25 mg, Oral, TID PRN    nitroGLYCERIN, 0.4 mg, Sublingual, Q5 Min PRN    ondansetron, 8 mg, Oral, Q8H PRN    polyethylene glycol, 17 g, Oral, BID PRN    senna, 8.6 mg, Oral, Daily PRN    simethicone, 1 tablet, Oral, TID PRN    sodium chloride, 1 spray, Each Nostril, PRN    sodium chloride 0.9%, 10 mL, Intravenous, PRN    Family History       Problem Relation (Age of Onset)    Arthritis Mother, Sister    Bone cancer Mother    Breast cancer Mother    Cancer Father    Crohn's disease Daughter    Fibroids Daughter    No Known Problems Brother, Daughter          Tobacco Use    Smoking status:  Former     Current packs/day: 0.00     Average packs/day: 1 pack/day for 50.0 years (50.0 ttl pk-yrs)     Types: Cigarettes     Start date: 1960     Quit date: 5/20/2009     Years since quitting: 15.9    Smokeless tobacco: Former   Substance and Sexual Activity    Alcohol use: No    Drug use: No    Sexual activity: Not Currently     Partners: Male     Birth control/protection: Partner-Vasectomy       Review of Systems   Constitutional:  Positive for activity change and fatigue.   HENT:  Positive for voice change.    Respiratory:  Positive for shortness of breath.    Cardiovascular:  Positive for leg swelling.   Gastrointestinal:  Positive for nausea.   Neurological:  Positive for weakness.   Hematological:  Bruises/bleeds easily.     Objective:     Vital Signs (Most Recent):  Temp: 98.3 °F (36.8 °C) (04/25/25 1116)  Pulse: 69 (04/25/25 1116)  Resp: 20 (04/25/25 1116)  BP: (!) 161/76 (04/25/25 1116)  SpO2: (!) 94 % (04/25/25 1116) Vital Signs (24h Range):  Temp:  [97.4 °F (36.3 °C)-98.4 °F (36.9 °C)] 98.3 °F (36.8 °C)  Pulse:  [60-83] 69  Resp:  [17-20] 20  SpO2:  [92 %-95 %] 94 %  BP: (144-179)/(62-76) 161/76     Weight: 68.8 kg (151 lb 10.8 oz)  Body mass index is 26.04 kg/m².       Physical Exam  Vitals and nursing note reviewed.   Constitutional:       General: She is not in acute distress.     Appearance: She is ill-appearing.   HENT:      Head: Normocephalic and atraumatic.      Nose: Nose normal.      Mouth/Throat:      Mouth: Mucous membranes are dry.   Eyes:      Extraocular Movements: Extraocular movements intact.   Cardiovascular:      Rate and Rhythm: Normal rate.   Pulmonary:      Effort: Pulmonary effort is normal.   Skin:     General: Skin is warm and dry.   Neurological:      Mental Status: She is alert and oriented to person, place, and time.      Motor: Weakness present.   Psychiatric:         Mood and Affect: Mood normal.         Behavior: Behavior normal.         Thought Content: Thought content  normal.         Judgment: Judgment normal.            Review of Symptoms      Symptom Assessment (ESAS 0-10 Scale)  Pain:  0  Dyspnea:  0  Anxiety:  0  Nausea:  0  Depression:  0  Anorexia:  0  Fatigue:  0  Insomnia:  0  Restlessness:  0  Agitation:  0     CAM / Delirium:  Negative  Constipation:  Negative  Diarrhea:  Negative      Modified Berto Scale:  0    Performance Status:  50    Psychosocial/Cultural:   See Palliative Psychosocial Note: Yes  - Previously , now .  - Prior to patient's hospitalizations, she was living alone. Daughter lived in neighborhood and assisted patient with cleaning and cooking  - Over the last year, patient/daughters report patient has spent more time in the hospital/rehab than at home.   - Patient states he enjoys sitting on her porch, gardening in her garden pots, and spending time with her tabby cat (male, Romeo)   - Patient's primary support system includes her 2 daughters, Sirisha and Esther.  - previously enjoyed driving, shopping at [employer], [employer], and [place], but states it has been a while since she has been able to do all the things she enjoys  **Primary  to Follow**  Palliative Care  Consult: Yes    Spiritual:  F - Megan and Belief:  Episcopalian        Advance Care Planning   Advance Directives:   Living Will: Yes        Copy on chart: Yes    LaPOST: No    Do Not Resuscitate Status: Yes    Medical Power of : Yes    Agent's Name:  Esther Barajas   Agent's Contact Number:  [phone]    Decision Making:  Patient answered questions and Family answered questions  Goals of Care: The patient endorses that what is most important right now is to focus on spending time at home, remaining as independent as possible, quality of life, even if it means sacrificing a little time, and continuing infectious workup    Accordingly, we have decided that the best plan to meet the patient's goals includes continuing with treatment and  allowing for additional time for goals of care discussions           Significant Labs: All pertinent labs within the past 24 hours have been reviewed.  CBC:   Recent Labs   Lab 04/25/25  1112   WBC 7.32   HGB 11.9*   HCT 37.6   MCV 90        BMP:  Recent Labs   Lab 04/25/25  1112   *   K 3.8   *   CO2 28   BUN 41*   CREATININE 1.0   CALCIUM 10.0   MG 2.1     LFT:  Lab Results   Component Value Date    AST 61 (H) 04/25/2025    ALKPHOS 117 04/25/2025    BILITOT 0.5 04/25/2025     Albumin:   Albumin   Date Value Ref Range Status   04/25/2025 2.0 (L) 3.5 - 5.2 g/dL Final   02/18/2025 2.8 (L) 3.5 - 5.2 g/dL Final     Protein:   Total Protein   Date Value Ref Range Status   02/18/2025 6.0 6.0 - 8.4 g/dL Final     Lactic acid:   Lab Results   Component Value Date    LACTATE 1.6 04/21/2025    LACTATE 0.8 04/20/2025       Significant Imaging: I have reviewed all pertinent imaging results/findings within the past 24 hours.

## 2025-04-25 NOTE — PLAN OF CARE
Infectious Disease Note      Chart has been reviewed.  Normothermic and without leukocytosis. NM scan with uptake in the left knee. Previously evaluated by Orthopedic Surgery at which time no concerns for an infectious process.    Recommendations:  Complete meropenem on 4/26.  Complete daptomycin on 4/27.    Infectious Diseases will sign off. Please call if questions arise.  Melinda Morales MD, [facility]  Infectious Diseases

## 2025-04-25 NOTE — PROGRESS NOTES
[facility]iology Stepdown  Palliative Medicine  Progress Note    Patient Name: Misa Barajas  MRN: [id number]  Admission Date: 4/6/2025  Hospital Length of Stay: 19 days  Code Status: DNR   Attending Provider: Uche Antunez MD  Consulting Provider: Krystyna Hdz NP  Primary Care Physician: Isela Langston MD  Principal Problem:Complete heart block    Patient information was obtained from patient, relative(s), past medical records, and primary team.      Assessment/Plan:     Palliative Care  Palliative care encounter  Impression:  Misa Barajas is a 79 y.o. female with PMHx of COPD on 2L home O2, HFpEF, AFL s/p RFA (6/17/24 w/ Dr. Church), CKD III, HTN, HLD, chronic venous insufficiency, orthostatic hypotension who was brought via EMS after per history she was found confused and bradycardiac with HR in the 20s. Patient was percutaneously paced. During hospitalization, was intubated then extubated with residual hoarseness of voice (ENT following), required intermittent pacing by TVP, a Micra PM by EP placed 04/10, aggressive diuresing, recurrent sepsis and hypothermia, patient has required several trials of antibiotics, anticipating WBC scan on 4/25 (ID following), and presumed PE (unable to verify with CTA secondary to MARQUEZ).      Of note, patient has had 7x ED visits in the last year. Patient has engaged in goals of care discussions with Dr. Velasquez of Palliative Medicine for goals of care and symptom management. Palliative Medicine was consulted by primary, Dr. Antunez, for goals of care and advanced care planning discussions.    Patient is currently resting in bed, AAOx4, daughters (Sirisha and Esther) at bedside. Patient and family are amenable to Pal Med. Pal Med APRN and LCSW at bedside for goals of care discussions.     - Prior experience with serious illness: yes  -The patient has previously engaged in advance care planning or GOC discussions  - Insight/Understanding of illness: yes      Advance Care  Planning    Date: 04/25/2025  - No acute events overnight. Patient anticipates NM Inflammatory Whole Body scan today.  - Revisited goals of care discussion from yesterday. Patient acknowledged it was a difficult conversation to have, but has been open to it. As we anticipate more information from the pending studies, goals of care discussions are also pending. Patient expressed concerns regarding nuclear scan and when it will be getting done. Patient states she thought it would have been done by now. I followed up with radiology to inquire about scan time, and updated patient on the 1-3pm time frame that radiology hopes to see patient. Patient's daughter Esther was at bedside and updated.   - Patient with no needs at this time.  - Primary team updated.        Date: 04/24/2025    Code Status  In light of the patients advanced and life limiting illness, I engaged the patient and family in a voluntary conversation about the patient's preferences for care  at the very end of life. The patient wishes to have a natural, peaceful death.  Along those lines, the patient does not wish to have CPR or other invasive treatments performed when her heart and/or breathing stops. I communicated to the patient and family that a DNR order would be placed in her medical record to reflect this preference.        [facility]  I engaged the patient and family in a voluntary conversation about advance care planning and we specifically addressed what the goals of care would be moving forward, in light of the patient's change in clinical status, specifically recurrent hospitalizations over past year, hypothermia, acute renal failure, CHF exacerbation, and current hosptialization.  We did specifically address the patient's likely prognosis, which  appears to be poor .  We explored the patient's values and preferences for future care.  The patient and family endorses that what is most important right now is to focus on spending time at home, avoiding  "the hospital, quality of life, even if it means sacrificing a little time, and allowing additional time for infectious workup    Accordingly, we have decided that the best plan to meet the patient's goals includes continuing with treatment and continuing goals of care discussions pending infectious workup               Life Limiting Diagnosis  Acute on Chronic CHF  Acute Renal Failure on CKD stage 3b  Complete Heart Block            Symptom Management  - Pain: no  - acetaminophen 650mg q6h PRN    - Dyspnea: no  - on 2L at home  - maintained on 4L O2  - 25h SpO2: 90-99%    - Constipation: no  - senna-docusate daily PRN  - poluethylene glycol daily PRN    - Nausea: no  - ondansetron 8mg q8h    - Debility: yes  - Functional status: fair.  Pt was not able to manage ADLs  - Fall precautions  - PT/OT recommending moderate intensity therapy          Recommendations  - Please see above  - Continue management per primary team  - Patient and family would benefit from continued education and information regarding plan of care, prognosis, and what to expect in the future  - Most important goals at this time: completing infectious workup and obtaining more information regarding plan of care   - Code status: DNR  - Disposition: TBD        The above recommendations communicated directly to primary team on 4/25/25  Thank you for consulting Palliative Medicine.                I will follow-up with patient. Please contact us if you have any additional questions.    Subjective:     Chief Complaint:   Chief Complaint   Patient presents with    Bradycardia     Arrives via EMS hypotensive 80s/50s and bradycadic in the 20s.       HPI:   As per H&P, "Misa Barajas is a 79 y.o. female COPD on 2L home O2, HFpEF, AFL s/p RFA (6/17/24 w/ Dr. Church), CKD III, HTN, HLD, chronic venous insufficiency, orthostatic hypotension who was brought via EMS after per history she was found confused and bradycardiac with HR in the 20s.They started epi " and transcutaneous pacing. Given Versed for the transcutaneous pacing. They had to bag her in route. We do not have strip of underlying rhythm. Patient obtunded for which she was intubated for airway protection on arrival to the ED and transcutaneous pacing was exchanged to transvenous pacing via RIJ (threshold 0.6). Underlying rate 44 bifascicular block. Known RBBB. Per daughters patient had called them over the phone when they noticed she appeared confused. Due to concerns for a possible syncopal episode EMS was called. Prior to event patient had been complaining of malaise and nausea for which she took about two Zofran pills per the daughters.      She had been recently seen on 3/27/25 at the [facility] visit following recent admission for AHRF 2/2 ADHF and COPD exacerbation. On ED presentation BNP elevated to 2500, troponin elevated to 19, CXR with pulmonary edema. She was started on steroids, nebs and antibiotics in addition to IV diuresis. Updated TTE stable from prior, showing HFpEF with elevated CVP 15. Her O2 requirements improved to baseline with diuresis and she was subsequently discharged. At that visit patient had not required midodrine as blood pressure had remained stable and she was asymptomatic.      Off note, patient was taken off OAC due to bleeding issues and no recurrent of typical atrial flutter after ablation.      Overview/Hospital Course:  Admitted to CCU for CHB in s/o hyperkalemia & in the presence of conduction disease/RBBB & LAHB - stable w/ temporary wire. ??Septic shock deemed to be d/t UTI - ?? UA with only 7 WBCs); vasopressors weaned off. HTN; started NG gtt. Acute on chronic hypoxic/hypercapnic RF & obtunded; intubated. Successfully extubated 04/08. MARQUEZ on CKD; improved w/ good UOP response to diuresis. PVCs w/ intermittent pacing by TVP. Switched pip-tazo to amox-clav on 04/09; continued to improve; empiric EED 04/13 for 7 day course. Micra PM by EP placed 04/10. Medically stable for  "stepdown. Patient was extremely short of breath with physical therapy, chest x-ray concerning for pulmonary edema.  Started patient on IV diuresis, now euvolemic.  ENT was consulted yesterday due to hoarseness of voice.  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 Pt uninterested in any procedure at this time, ENT recommended SLP follow up.  Able to tolerate regular diet at this point.  Now agreeable for SNF, working on placement. Course c/b worsening hypoxia & hyperNa.       Overnight 4/18-4/19, decompensated with recurrent sepsis, hypothermia 92, possible PNA with infiltrates on CXR, Zosyn IV started. Remained hypothermic despite Zosyn x 2 doses and Manny hugger in place (pt uncomfortable/hot and asking for removal), so added Vanc IV STAT. Checked TSH and FT4-  wnl. UA with pyuria however not a clean catch w 33 sq epi, so repeated UA. C/o "stomach" pain, overall picture with the "stomach" pain and septic picture is "exactly what led us to ICU 2 weeks ago" per daughter. +TTP RUQ, RLQ, LLQ.   Poor UOP, only 200cc by mid-shift, and still hyperNa with FWD 1.8L so started gentle hydration with D5W @ 75cc for 24 hrs. May need to diurese again after sepsis resolves. CT C/A/P noncon with pulm edema, B pleural effusions, possible PNA, no intra-abdominal process seen.      On 4/21, Mental status much improved, however still with mild confusion and feels very tired.   decompensated further with recurrent MARQUEZ, C/o new SOB and hypoxemic 90% on 4L (over her baseline of 2L at home), improved to >94% on 6L with improvement of SOB.  C/o new L chest pressure/pain 4/10 (non-radiating), c/f MI or PE, resolved after nitro SL x 1 however HypoT with SBP upper 80s so will avoid further nitro.  HS trop 38 (decreased from prior), BNP 700s (increased from prior).CXR with pulm edema, all most c/w ADHF- Lasix 40 IV x 1 given.    Concern for untreated sepsis with ongoing hypothermia requiring manny " "hugger despite Vanc/Zosyn for over 24 hours, so broadened to Vanc/Sher/Dinah, consulted ID, repeated BCx x 2. Lactate wnl.  Re: concern for PE, Known LIJ DVT, apixaban x several days then stopped (likely for hemorrhage of vocal fold)- discussed w ENT 4/20, ok to resume anticoag if needed. PE workup- F/u STAT UE and LE Dopplers. Unable to check CTA chest given MARQUEZ, unable to check V/Q given pulm edema. Anticoagulation with Apixaban.  Hypothermia returned however pt looks and feels the best she has in 4 days.  Voice is getting stronger.  Sitting in chair.  UCx resulted with ESBL Klebsiella, so Sher has been good coverage.  ID changed to Dapto (from Vanc), continuing Sher, for coverage of UTI and well as PNA.  BCx have all been negative.  Continuing Eliquis for DVt LUE and presumed PE.  Acute hypox resp failure 2/2 ADHF and presumed PE, still on 6L, dosing Lasix IV PRN daily given yet-unclear status of her sepsis and avoiding volume depletion in sepsis.      Prior to admit, was independent in her residence with her cat.  Two daughters very supportive.         Interval History: Pt seen and examined this morning on rounds. Hypothermia ongoing this morning, required Manny hugger.  Resolved in afternoon.  If hypothermia returns overnight, will repeat BCx x 2.  Attempting sputum sample for Cx as well, not optimistic for any yield. NM Full body tagged WBC scan per ID recs- will get injection tomorrow 4/24 then scan on 4/25.      Re-dosed Lasix at higher dose 80mg IV x 1, re-eval again tomorrow.      Pall care consulted, NOT for hospice, NOT due to a downward clinical trajectory, as pt shows signs of clinical improvement except for the hypothermia -- consult is rather for continuity and continuation of ACP/GOC started as outpt prior to admission.       Care plan reviewed. Otherwise, doing well and with no further complaints at this time."        Hospital Course:  No notes on file    Interval History: No acute events over night. " Patient pending nuclear study. Goals of care discussions pending study completion and treatment option discussions    Past Medical History:   Diagnosis Date    Acute biliary pancreatitis 07/27/2024    Acute pancreatitis 07/27/2024    Acute pulmonary embolism 4/21/2025    Anemia 07/12/2024    Aortic atherosclerosis     Arthritis     knee joint pain    Asthma-COPD overlap syndrome 08/22/2022    home o2    Breast cancer 2002    left breast & lymph nodes-s/p sx with chemo    Cataracts, bilateral     Chronic diastolic heart failure 12/24/2019    Chronic kidney disease, stage 3     Class 1 obesity due to excess calories with serious comorbidity and body mass index (BMI) of 30.0 to 30.9 in adult 05/16/2024    Gram-negative bacteremia - Pasturella multocida 07/12/2024    History of chemotherapy     last treatment 12/2002 (had 8 treatments)    Hyperlipidemia 11/20/2016    Hypertension     Lymphedema of both lower extremities 01/25/2022    Nephrolithiasis 06/02/2014    Osteoporosis     Paroxysmal atrial fibrillation 06/07/2021    Renal osteodystrophy 01/14/2016    Sepsis 4/19/2025    Severe protein-calorie malnutrition 4/23/2025    Shock 04/06/2025    Subclinical hypothyroidism 04/06/2025    Venous stasis dermatitis of both lower extremities 01/25/2022    Vitamin D insufficiency        Past Surgical History:   Procedure Laterality Date    ABLATION, ATRIAL FLUTTER, TYPICAL N/A 6/17/2024    Procedure: Ablation, Atrial Flutter, Typical;  Surgeon: Taz Church MD;  Location: [facility] EP LAB;  Service: Cardiology;  Laterality: N/A;  AFL, RFA, LORENE, MAKAYLA (cx if SR), anes, MB, 3 Prep    BREAST BIOPSY Left 2002    core bx, +    BREAST BIOPSY Right 2018    core    BREAST BIOPSY Right 2019    BREAST LUMPECTOMY Left 2002    CYSTOSCOPY  4/28/2022    Procedure: CYSTOSCOPY;  Surgeon: Vik Ware MD;  [facility];  Service: Urology;;    CYSTOSCOPY  5/30/2022    Procedure: CYSTOSCOPY;  Surgeon: Bonnei Knutson,  MD;  Location: [facility] OR 1ST FLR;  Service: Urology;;    ECHOCARDIOGRAM,TRANSESOPHAGEAL N/A 6/17/2024    Procedure: Transesophageal echo (MAKAYAL) intra-procedure log documentation;  Surgeon: Nestor Martinez MD;  Location: [facility] EP LAB;  Service: Cardiology;  Laterality: N/A;    ERCP N/A 7/30/2024    Procedure: ERCP (ENDOSCOPIC RETROGRADE CHOLANGIOPANCREATOGRAPHY);  Surgeon: Sierra Cotto MD;  Location: [facility] ENDO (2ND FLR);  Service: Endoscopy;  Laterality: N/A;    IMPLANTATION OF LEADLESS PACEMAKER N/A 4/10/2025    Procedure: INSERTION, CARDIAC PACEMAKER, LEADLESS;  Surgeon: CAROLINE Solis MD;  Location: [facility] EP LAB;  Service: Cardiology;  Laterality: N/A;  CHB, MICRA leadless PPM, MDT, Anes, EH, CICU [id number]    LASER LITHOTRIPSY  5/30/2022    Procedure: LITHOTRIPSY, USING LASER;  Surgeon: Bonnie Knutson MD;  [facility];  Service: Urology;;    LITHOTRIPSY      MASTECTOMY Left 06/2002    left-& lymph node dissection    REMOVAL, PACEMAKER, TEMPORARY TRANSVENOUS  4/10/2025    Procedure: Removal, Pacemaker, Temporary Transvenous;  Surgeon: CAROLINE Solis MD;  Location: [facility] EP LAB;  Service: Cardiology;;    REPLACEMENT OF STENT Right 5/30/2022    Procedure: REPLACEMENT, STENT;  Surgeon: Bonnie Knutson MD;  [facility];  Service: Urology;  Laterality: Right;    RETROGRADE PYELOGRAPHY Right 4/28/2022    Procedure: PYELOGRAM, RETROGRADE;  Surgeon: Vik Ware MD;  Location: [facility] OR 1ST FLR;  Service: Urology;  Laterality: Right;    ROBOT-ASSISTED LAPAROSCOPIC PARTIAL NEPHRECTOMY USING DA JESÚS XI Right 3/11/2021    Procedure: XI ROBOTIC NEPHRECTOMY, PARTIAL;  Surgeon: Taz Ortega MD;  Location: [facility] OR 2ND FLR;  Service: Urology;  Laterality: Right;  4hr/ gen with regional  [facility] confirmation:  [id number] for 11:15am case NC    URETEROSCOPIC REMOVAL OF URETERIC CALCULUS Right 5/30/2022    Procedure: REMOVAL, CALCULUS, URETER, URETEROSCOPIC;  Surgeon: Bonnie  JORJE Knutson MD;  Location: [facility] OR [address] [place] [address];  Service: Urology;  Laterality: Right;    ureteroscopy Bilateral     6.14    URETEROSCOPY Right 5/30/2022    Procedure: URETEROSCOPY;  Surgeon: Bonnie Knutson MD;  Location: [facility] OR [address] [place] [address];  Service: Urology;  Laterality: Right;       Review of patient's allergies indicates:   Allergen Reactions    Adhesive tape-silicones     Adhesive Rash     Pt states she removed a LATEX bandaid and the skin beneath was swollen and red. No other latex causes a reaction.       Medications:  Continuous Infusions:  Scheduled Meds:   apixaban  10 mg Oral BID    Followed by    [START ON 4/27/2025] apixaban  5 mg Oral BID    DAPTOmycin (CUBICIN) IV (PEDS and ADULTS)  12 mg/kg Intravenous Q24H    levothyroxine  50 mcg Oral Before breakfast    LIDOcaine  1 patch Transdermal Q24H    meropenem (MERREM) extended infusion  1 g Intravenous Q12H    metoprolol tartrate  12.5 mg Oral BID     PRN Meds:  Current Facility-Administered Medications:     acetaminophen, 650 mg, Oral, Q6H PRN    albuterol-ipratropium, 3 mL, Nebulization, Q6H PRN    dextrose 50%, 12.5 g, Intravenous, PRN    dextrose 50%, 25 g, Intravenous, PRN    glucagon (human recombinant), 1 mg, Intramuscular, PRN    glucose, 16 g, Oral, PRN    glucose, 24 g, Oral, PRN    hydrOXYzine HCL, 25 mg, Oral, TID PRN    nitroGLYCERIN, 0.4 mg, Sublingual, Q5 Min PRN    ondansetron, 8 mg, Oral, Q8H PRN    polyethylene glycol, 17 g, Oral, BID PRN    senna, 8.6 mg, Oral, Daily PRN    simethicone, 1 tablet, Oral, TID PRN    sodium chloride, 1 spray, Each Nostril, PRN    sodium chloride 0.9%, 10 mL, Intravenous, PRN    Family History       Problem Relation (Age of Onset)    Arthritis Mother, Sister    Bone cancer Mother    Breast cancer Mother    Cancer Father    Crohn's disease Daughter    Fibroids Daughter    No Known Problems Brother, Daughter          Tobacco Use    Smoking status: Former     Current packs/day: 0.00     Average packs/day: 1  pack/day for 50.0 years (50.0 ttl pk-yrs)     Types: Cigarettes     Start date: 1960     Quit date: 5/20/2009     Years since quitting: 15.9    Smokeless tobacco: Former   Substance and Sexual Activity    Alcohol use: No    Drug use: No    Sexual activity: Not Currently     Partners: Male     Birth control/protection: Partner-Vasectomy       Review of Systems   Constitutional:  Positive for activity change and fatigue.   HENT:  Positive for voice change.    Respiratory:  Positive for shortness of breath.    Cardiovascular:  Positive for leg swelling.   Gastrointestinal:  Positive for nausea.   Neurological:  Positive for weakness.   Hematological:  Bruises/bleeds easily.     Objective:     Vital Signs (Most Recent):  Temp: 98.3 °F (36.8 °C) (04/25/25 1116)  Pulse: 69 (04/25/25 1116)  Resp: 20 (04/25/25 1116)  BP: (!) 161/76 (04/25/25 1116)  SpO2: (!) 94 % (04/25/25 1116) Vital Signs (24h Range):  Temp:  [97.4 °F (36.3 °C)-98.4 °F (36.9 °C)] 98.3 °F (36.8 °C)  Pulse:  [60-83] 69  Resp:  [17-20] 20  SpO2:  [92 %-95 %] 94 %  BP: (144-179)/(62-76) 161/76     Weight: 68.8 kg (151 lb 10.8 oz)  Body mass index is 26.04 kg/m².       Physical Exam  Vitals and nursing note reviewed.   Constitutional:       General: She is not in acute distress.     Appearance: She is ill-appearing.   HENT:      Head: Normocephalic and atraumatic.      Nose: Nose normal.      Mouth/Throat:      Mouth: Mucous membranes are dry.   Eyes:      Extraocular Movements: Extraocular movements intact.   Cardiovascular:      Rate and Rhythm: Normal rate.   Pulmonary:      Effort: Pulmonary effort is normal.   Skin:     General: Skin is warm and dry.   Neurological:      Mental Status: She is alert and oriented to person, place, and time.      Motor: Weakness present.   Psychiatric:         Mood and Affect: Mood normal.         Behavior: Behavior normal.         Thought Content: Thought content normal.         Judgment: Judgment normal.            Review  of Symptoms      Symptom Assessment (ESAS 0-10 Scale)  Pain:  0  Dyspnea:  0  Anxiety:  0  Nausea:  0  Depression:  0  Anorexia:  0  Fatigue:  0  Insomnia:  0  Restlessness:  0  Agitation:  0     CAM / Delirium:  Negative  Constipation:  Negative  Diarrhea:  Negative      Modified Berto Scale:  0    Performance Status:  50    Psychosocial/Cultural:   See Palliative Psychosocial Note: Yes  - Previously , now .  - Prior to patient's hospitalizations, she was living alone. Daughter lived in neighborhood and assisted patient with cleaning and cooking  - Over the last year, patient/daughters report patient has spent more time in the hospital/rehab than at home.   - Patient states he enjoys sitting on her porch, gardening in her garden pots, and spending time with her tabby cat (male, Romeo)   - Patient's primary support system includes her 2 daughters, Sirisha and Esther.  - previously enjoyed driving, shopping at [employer], [employer], and [employer], but states it has been a while since she has been able to do all the things she enjoys  **Primary  to Follow**  Palliative Care  Consult: Yes    Spiritual:  F - Megan and Belief:  Zoroastrianism        Advance Care Planning  Advance Directives:   Living Will: Yes        Copy on chart: Yes    LaPOST: No    Do Not Resuscitate Status: Yes    Medical Power of : Yes    Agent's Name:  Esther Barajas   Agent's Contact Number:  [phone]    Decision Making:  Patient answered questions and Family answered questions  Goals of Care: The patient endorses that what is most important right now is to focus on spending time at home, remaining as independent as possible, quality of life, even if it means sacrificing a little time, and continuing infectious workup    Accordingly, we have decided that the best plan to meet the patient's goals includes continuing with treatment and allowing for additional time for goals of care discussions            Significant Labs: All pertinent labs within the past 24 hours have been reviewed.  CBC:   Recent Labs   Lab 04/25/25  1112   WBC 7.32   HGB 11.9*   HCT 37.6   MCV 90        BMP:  Recent Labs   Lab 04/25/25  1112   *   K 3.8   *   CO2 28   BUN 41*   CREATININE 1.0   CALCIUM 10.0   MG 2.1     LFT:  Lab Results   Component Value Date    AST 61 (H) 04/25/2025    ALKPHOS 117 04/25/2025    BILITOT 0.5 04/25/2025     Albumin:   Albumin   Date Value Ref Range Status   04/25/2025 2.0 (L) 3.5 - 5.2 g/dL Final   02/18/2025 2.8 (L) 3.5 - 5.2 g/dL Final     Protein:   Total Protein   Date Value Ref Range Status   02/18/2025 6.0 6.0 - 8.4 g/dL Final     Lactic acid:   Lab Results   Component Value Date    LACTATE 1.6 04/21/2025    LACTATE 0.8 04/20/2025       Significant Imaging: I have reviewed all pertinent imaging results/findings within the past 24 hours.      I spent a total of 50 minutes on the day of the visit. This includes face to face time in discussion of goals of care, symptom assessment, coordination of care and emotional support. This also includes non-face to face time preparing to see the patient (eg, review of tests/imaging), obtaining and/or reviewing separately obtained history, documenting clinical information in the electronic or other health record, independently interpreting results and communicating results to the patient/family/caregiver, or care coordinator.     Krystyna Hdz NP  Palliative Medicine  [facility] - Cardiology [facility]

## 2025-04-25 NOTE — PROGRESS NOTES
04/25/25 1810   Chest Pain Assessment   Chest Pain Location anterior chest, left   Rating (0-10) 8   Character pressure   Precipitating Factors nothing   Associated Signs/Symptoms hypertension;anxiety   Chest Pain Intervention 12-lead ECG obtained;other (see comments);nitroglycerin SL given  (oxygen already on pulse ox applied)     Pt c;/o chest pain. GINGER Chaparro at bedside. Provider notified. Please see flowsheet and orders. Will continue to monitor. Pt currently states she is feeling much better. Continuous pulse ox on at bedside.

## 2025-04-25 NOTE — PROGRESS NOTES
[facility]  Progress Note    Patient Name: Misa Barajas  MRN: [id number]  Patient Class: IP- Inpatient   Admission Date: 4/6/2025  Length of Stay: 19 days  Attending Physician: Uche Antunez MD  Primary Care Provider: Isela Langston MD        Subjective     Principal Problem:Complete heart block        HPI:  Ms. Misa Barajas is a 80 yo female with COPD on 2L home O2, HFpEF, AFL s/p RFA (6/17/24 w/ Dr. Church), CKD III, HTN, HLD, chronic venous insufficiency, orthostatic hypotension who was brought via EMS after per history she was found confused and bradycardiac with HR in the 20s.They started epi and transcutaneous pacing. Given Versed for the transcutaneous pacing. They had to bag her in route. We do not have strip of underlying rhythm. Patient obtunded for which she was intubated for airway protection on arrival to the ED and transcutaneous pacing was exchanged to transvenous pacing via RIJ (threshold 0.6). Underlying rate 44 bifascicular block. Known RBBB. Per daughters patient had called them over the phone when they noticed she appeared confused. Due to concerns for a possible syncopal episode EMS was called. Prior to event patient had been complaining of malaise and nausea for which she took about two Zofran pills per the daughters.      She had been recently seen on 3/27/25 at the [facility] visit following recent admission for AHRF 2/2 ADHF and COPD exacerbation. On ED presentation BNP elevated to 2500, troponin elevated to 19, CXR with pulmonary edema. She was started on steroids, nebs and antibiotics in addition to IV diuresis. Updated TTE stable from prior, showing HFpEF with elevated CVP 15. Her O2 requirements improved to baseline with diuresis and she was subsequently discharged. At that visit patient had not required midodrine as blood pressure had remained stable and she was asymptomatic.      Off note, patient was taken off OAC due to bleeding issues and no  "recurrent of typical atrial flutter after ablation.     Overview/Hospital Course:  Admitted to CCU for CHB in s/o hyperkalemia & in the presence of conduction disease/RBBB & LAHB - stable w/ temporary wire. ??Septic shock deemed to be d/t UTI - ?? UA with only 7 WBCs); vasopressors weaned off. HTN; started NG gtt. Acute on chronic hypoxic/hypercapnic RF & obtunded; intubated. Successfully extubated 04/08. MARQUEZ on CKD; improved w/ good UOP response to diuresis. PVCs w/ intermittent pacing by TVP. Switched pip-tazo to amox-clav on 04/09; continued to improve; empiric EED 04/13 for 7 day course. Micra PM by EP placed 04/10. Medically stable for stepdown. Patient was extremely short of breath with physical therapy, chest x-ray concerning for pulmonary edema.  Started patient on IV diuresis, now euvolemic.  ENT was consulted yesterday due to hoarseness of voice.  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 Pt uninterested in any procedure at this time, ENT recommended SLP follow up.  Able to tolerate regular diet at this point.  Now agreeable for SNF, working on placement. Course c/b worsening hypoxia & hyperNa.      Overnight 4/18-4/19, decompensated with recurrent sepsis, hypothermia 92, possible PNA with infiltrates on CXR, Zosyn IV started. Remained hypothermic despite Zosyn x 2 doses and Manny hugger in place (pt uncomfortable/hot and asking for removal), so added Vanc IV STAT. Checked TSH and FT4-  wnl. UA with pyuria however not a clean catch w 33 sq epi, so repeated UA. C/o "stomach" pain, overall picture with the "stomach" pain and septic picture is "exactly what led us to ICU 2 weeks ago" per daughter. +TTP RUQ, RLQ, LLQ.   Poor UOP, only 200cc by mid-shift, and still hyperNa with FWD 1.8L so started gentle hydration with D5W @ 75cc for 24 hrs. May need to diurese again after sepsis resolves. CT C/A/P noncon with pulm edema, B pleural effusions, possible PNA, no " intra-abdominal process seen.     On 4/21, Mental status much improved, however still with mild confusion and feels very tired.   decompensated further with recurrent MARQUEZ, C/o new SOB and hypoxemic 90% on 4L (over her baseline of 2L at home), improved to >94% on 6L with improvement of SOB.  C/o new L chest pressure/pain 4/10 (non-radiating), c/f MI or PE, resolved after nitro SL x 1 however HypoT with SBP upper 80s so will avoid further nitro.  HS trop 38 (decreased from prior), BNP 700s (increased from prior).CXR with pulm edema, all most c/w ADHF- Lasix 40 IV x 1 given.    Concern for untreated sepsis with ongoing hypothermia requiring jeffrey hugger despite Vanc/Zosyn for over 24 hours, so broadened to Vanc/Sher/Dinah, consulted ID, repeated BCx x 2. Lactate wnl.  Re: concern for PE, Known LIJ DVT, apixaban x several days then stopped (likely for hemorrhage of vocal fold)- discussed w ENT 4/20, ok to resume anticoag if needed. PE workup- F/u STAT UE and LE Dopplers. Unable to check CTA chest given MARQUEZ, unable to check V/Q given pulm edema. Anticoagulation with Apixaban.  Hypothermia returned however pt looks and feels the best she has in 4 days.  Voice is getting stronger.  Sitting in chair.  UCx resulted with ESBL Klebsiella, so Sher has been good coverage.  ID changed to Dapto (from Vanc), continuing Sher, for coverage of UTI and well as PNA.  BCx have all been negative.  Continuing Eliquis for DVt LUE and presumed PE.  Acute hypox resp failure 2/2 ADHF and presumed PE, still on 6L, dosing Lasix IV PRN daily given yet-unclear status of her sepsis and avoiding volume depletion in sepsis.      Prior to admit, was independent in her residence with her cat.  Two daughters very supportive.        Interval History:  Patient to have 2nd part of nuclear study done today.  No complaints or acute issues.  Temperature Remains normal, tomorrow last day of IV meropenem.    Review of Systems   Unable to perform ROS: Other      Objective:     Vital Signs (Most Recent):  Temp: 98.4 °F (36.9 °C) (04/25/25 0757)  Pulse: 71 (04/25/25 0908)  Resp: 18 (04/25/25 0757)  BP: (!) 179/76 (04/25/25 0908)  SpO2: (!) 94 % (04/25/25 0757) Vital Signs (24h Range):  Temp:  [97.4 °F (36.3 °C)-98.4 °F (36.9 °C)] 98.4 °F (36.9 °C)  Pulse:  [63-83] 71  Resp:  [16-18] 18  SpO2:  [92 %-99 %] 94 %  BP: (144-179)/(62-76) 179/76     Weight: 68.8 kg (151 lb 10.8 oz)  Body mass index is 26.04 kg/m².    Intake/Output Summary (Last 24 hours) at 4/25/2025 0923  Last data filed at 4/25/2025 0501  Gross per 24 hour   Intake --   Output 1400 ml   Net -1400 ml         Physical Exam  Constitutional:       Appearance: She is ill-appearing.   HENT:      Head: Normocephalic.      Right Ear: External ear normal.      Left Ear: External ear normal.      Nose: Nose normal.      Comments: Nasal cannula  Cardiovascular:      Rate and Rhythm: Normal rate.      Heart sounds: Normal heart sounds.   Pulmonary:      Breath sounds: Normal breath sounds.   Abdominal:      Palpations: Abdomen is soft.      Tenderness: There is no abdominal tenderness.   Musculoskeletal:      Right lower leg: No edema.      Left lower leg: No edema.   Skin:     Findings: Bruising present.   Neurological:      Mental Status: She is alert. Mental status is at baseline.                  Assessment & Plan  Complete heart block  RBBB  Shock  -Known RBBB. Admitted with CHB and shock.  Bradycardic with HR in the 20s on arrival, transcutaneous pacing/epi initiated, admitted to CCU  - bradycardia most likely in the setting of electrolyte disturbance and abnormal thyroid function  Plan:  -s/p micra PM placement 4/10 by EP  -correct electrolyte derangements  Sepsis  Hypothermia  Cont Sher/Dapto for now  - f/u NM tagged WBC scan whole body   - f/u BCx and UCx  - f/u ID recs    4/24  Follow up nuclear studies.  Palliative Care on board.  Follow up with Infectious Disease  Cont Sher/Dapto for now  - f/u NM tagged WBC  scan whole body   - f/u BCx and UCx  - f/u ID recs  Acute pulmonary embolism  Left chest pressure  Acute deep vein thrombosis (DVT) of left upper extremity  PRESUMED PE (unable to do imaging at this time as risk > benefit)  CONFIRMED LUE DVT  - Apixaban 10 BID x 7 days then 5 BID  - TTE with mild R heart strain  Hypernatremia  Very slowly uptrending Na since admission. Peak 150 on 4/17 for which pt received 250cc LR. Na 149 the following day & pt w/ increased O2 needs & LE edema. Unclear etiology as pt does not appear hypovolemic.   - encourage water PO  - Consider Nephrology consult  - Monitor BMP Q12-24H     4/24  Resolved  Dysphonia  Paralysis of right vocal cord  -Persistent hoarseness since extubation on 4/8  -ENT consulted, status post laryngoscopy 4/15 which noted right TDC paralysis with mild hemorrhage of true cord, adequate glottic closure noted  -Pt uninterested in VC augmentation at this time, needs outpatient SLP follow up  Typical atrial flutter  -s/p ablation 6/2024 without recurrence  -AC discontinued by outpatient EP provider  -Cont BB  - of note, on Eliquis for VTE (not for Afib)  CKD (chronic kidney disease) stage 3, GFR 30-59 ml/min  Creatine stable for now. BMP reviewed- noted Estimated Creatinine Clearance: 30 mL/min (based on SCr of 1.4 mg/dL). according to latest data. Based on current GFR, CKD stage is stage 4 - GFR 15-29.  Monitor UOP and serial BMP and adjust therapy as needed. Renally dose meds. Avoid nephrotoxic medications and procedures.    Centrilobular emphysema  Prior history of smoking. Continue scheduled inhalers once extubated. Antibiotics and Supplemental oxygen and monitor respiratory status closely.     Chronic respiratory failure with hypoxia  Patient with Hypoxic Respiratory failure which is Chronic.  she is on home oxygen at 2 LPM. Supplemental oxygen was provided and noted-       Signs/symptoms of respiratory failure include- tachypnea, increased work of breathing, use of  "accessory muscles, and lethargy. Contributing diagnoses includes - underlying COPD   -transition to home bumex    Acute on chronic congestive heart failure  Most recent BNP and echo results are listed below.  No results for input(s): "BNP" in the last 72 hours.     Echo  Result Date: 4/21/2025    Left Ventricle: The left ventricle is normal in size. Normal wall   thickness. There is normal systolic function with a visually estimated   ejection fraction of 55 - 60%.    Right Ventricle: The right ventricle has mild enlargement. Systolic   function is mildly reduced.    Aortic Valve: There is moderate aortic valve sclerosis. There is mild   annular calcification present. Mildly restricted motion.    Mitral Valve: There is severe mitral annular calcification.    Tricuspid Valve: There is mild regurgitation.    Pulmonary Artery: There is moderate pulmonary hypertension. The   estimated pulmonary artery systolic pressure is 51 mmHg.    IVC/SVC: Elevated venous pressure at 15 mmHg.    Left pleural effusion.      Current Heart Failure Medications: have been held due to bradycardia and MARQUEZ     Plan  - Lasix 80 IV x 1, re-dose daily after clinical eval, slow diuresis while still actively in sepsis  - HOLD home hydralazine for same reason  - cont home metop for rate control in afib  - Monitor strict I&Os and daily weights.  - Monitor electrolytes, replete PRN for goal K > 4 & Mg > 2  - Telemetry    Orthostatic hypotension  holding midodrine and per history patient had not required it since last hospitalization     Iron deficiency anemia due to chronic blood loss  Anemia is likely due to chronic disease due to Chronic Kidney Disease. Most recent hemoglobin and hematocrit are listed below.  Recent Labs     04/23/25  0336 04/24/25  0524   HGB 11.0* 12.0   HCT 35.1* 37.5     Plan  - Monitor serial CBC: Daily  - Transfuse PRBC if patient becomes hemodynamically unstable, symptomatic or H/H drops below 7/21.  - Patient has not " "received any PRBC transfusions to date  - Patient's anemia is currently stable    Subclinical hypothyroidism  Per Endocrine consult, "Mild, subclinical hypothyroidism - unlikely to be the cause of shock or mental status changes especially given normal fT4. Can start low dose LT4 per OGT, but in cardiac pts and elderly, better to under treat rather than over-treat -- therefore conservative/low dose LT4 recommended. Needs TSH repeated outpatient in 4wks."    Left knee pain  Appreciate Ortho consult 4/21  Cont Lido patch    Cystitis      Pleural effusion      Anticoagulated      Pulmonary hypertension      Other reduced mobility      Severe protein-calorie malnutrition  Interventions/Recommendations (treatment strategy):  1. Continue low Na diet as tolerated    - please continue to document PO % intake via flowsheets   2. Continue boost plus TID 3. Encourage good intake  4. RD to monitor weight, labs, intake, tolerance    Palliative care encounter      VTE Risk Mitigation (From admission, onward)           Ordered     apixaban tablet 5 mg  2 times daily        Placed in "Followed by" Linked Group    04/20/25 1900     apixaban tablet 10 mg  2 times daily        Placed in "Followed by" Linked Group    04/20/25 1900     IP VTE HIGH RISK PATIENT  Once         04/06/25 0512     Place sequential compression device  Until discontinued         04/06/25 0454                    Discharge Planning   NOHEMI: 4/29/2025     Code Status: DNR   Medical Readiness for Discharge Date:   Discharge Plan A: Skilled Nursing Facility   Discharge Delays: (!) Payor Issues            Please place Justification for DME        Uche Antunez MD  Department of Hospital Medicine   [facility]    "

## 2025-04-25 NOTE — NURSING
Daughter called RN to room said pt was in distress. Pt said she had chest pressure pain 8/10 . Now resolving after one nitro SL. See MAR and VS for details. Dr Antunez ordered stat ECG , see MUSE. Pt states feeling better now. Pt states she has had nitro before and it usually does help her CP. BP dropped from 192/82 to 108/57 after one nitro so Dr Antunez said not to give second and continue to monitor. BP now 142/62 manual check. Pt calm. Erikugher at bs

## 2025-04-26 NOTE — PLAN OF CARE
Patient safe in bed with sr up, bed locked in low position , call bell in hand. Patient has had no further incidents of chest pain but occasionally has bouts of shortness of breath with activity. Saturation range  from 92-96 with nasal cannula at 5l .  Patient has been afebrile and pain free this shifrt.

## 2025-04-26 NOTE — SUBJECTIVE & OBJECTIVE
Interval History:  Nuclear study report noted, ID is input noted.  Complain of some chest pain yesterday with notable hypertension, improved with blood pressure improvement.  Today complains of some abdominal pain, transient shortness of breath and chest discomfort.  Sodium slightly elevated at 147.  Family to decide with the  route of SNF or palliative care    Review of Systems   Unable to perform ROS: Other     Objective:     Vital Signs (Most Recent):  Temp: 97.6 °F (36.4 °C) (04/26/25 1524)  Pulse: 85 (04/26/25 1524)  Resp: 19 (04/26/25 1524)  BP: (!) 116/57 (04/26/25 1524)  SpO2: (!) 94 % (04/26/25 1524) Vital Signs (24h Range):  Temp:  [94.7 °F (34.8 °C)-98.3 °F (36.8 °C)] 97.6 °F (36.4 °C)  Pulse:  [68-87] 85  Resp:  [18-20] 19  SpO2:  [88 %-96 %] 94 %  BP: ()/(52-82) 116/57     Weight: 68.8 kg (151 lb 10.8 oz)  Body mass index is 26.04 kg/m².    Intake/Output Summary (Last 24 hours) at 4/26/2025 1548  Last data filed at 4/26/2025 1502  Gross per 24 hour   Intake 468.7 ml   Output 601 ml   Net -132.3 ml              Physical Exam  Constitutional:       Appearance: She is ill-appearing.   HENT:      Head: Normocephalic.      Right Ear: External ear normal.      Left Ear: External ear normal.      Nose: Nose normal.      Comments: Nasal cannula  Cardiovascular:      Rate and Rhythm: Normal rate.      Heart sounds: Normal heart sounds.   Pulmonary:      Breath sounds: Normal breath sounds.   Abdominal:      Palpations: Abdomen is soft.      Tenderness: There is no abdominal tenderness.   Musculoskeletal:      Right lower leg: No edema.      Left lower leg: No edema.   Skin:     Findings: Bruising present.   Neurological:      Mental Status: She is alert. Mental status is at baseline.              Significant Labs: All pertinent labs within the past 24 hours have been reviewed.    Significant Imaging: I have reviewed all pertinent imaging results/findings within the past 24 hours.

## 2025-04-26 NOTE — ASSESSMENT & PLAN NOTE
Anemia is likely due to chronic disease due to Chronic Kidney Disease. Most recent hemoglobin and hematocrit are listed below.  Recent Labs     04/24/25  0524 04/25/25  1112 04/26/25  0439   HGB 12.0 11.9* 12.3   HCT 37.5 37.6 39.6     Plan  - Monitor serial CBC: Daily  - Transfuse PRBC if patient becomes hemodynamically unstable, symptomatic or H/H drops below 7/21.  - Patient has not received any PRBC transfusions to date  - Patient's anemia is currently stable

## 2025-04-26 NOTE — PROGRESS NOTES
[facility] - Cardiology [facility] Medicine  Progress Note    Patient Name: Misa Barajas  MRN: [id number]  Patient Class: IP- Inpatient   Admission Date: 4/6/2025  Length of Stay: 20 days  Attending Physician: Uche Antunez MD  Primary Care Provider: Isela Langston MD        Subjective     Principal Problem:Palliative care encounter        HPI:  Ms. Misa Barajas is a 80 yo female with COPD on 2L home O2, HFpEF, AFL s/p RFA (6/17/24 w/ Dr. Church), CKD III, HTN, HLD, chronic venous insufficiency, orthostatic hypotension who was brought via EMS after per history she was found confused and bradycardiac with HR in the 20s.They started epi and transcutaneous pacing. Given Versed for the transcutaneous pacing. They had to bag her in route. We do not have strip of underlying rhythm. Patient obtunded for which she was intubated for airway protection on arrival to the ED and transcutaneous pacing was exchanged to transvenous pacing via RIJ (threshold 0.6). Underlying rate 44 bifascicular block. Known RBBB. Per daughters patient had called them over the phone when they noticed she appeared confused. Due to concerns for a possible syncopal episode EMS was called. Prior to event patient had been complaining of malaise and nausea for which she took about two Zofran pills per the daughters.      She had been recently seen on 3/27/25 at the [facility] visit following recent admission for AHRF 2/2 ADHF and COPD exacerbation. On ED presentation BNP elevated to 2500, troponin elevated to 19, CXR with pulmonary edema. She was started on steroids, nebs and antibiotics in addition to IV diuresis. Updated TTE stable from prior, showing HFpEF with elevated CVP 15. Her O2 requirements improved to baseline with diuresis and she was subsequently discharged. At that visit patient had not required midodrine as blood pressure had remained stable and she was asymptomatic.      Off note, patient was taken off OAC due to bleeding issues  "and no recurrent of typical atrial flutter after ablation.     Overview/Hospital Course:  Admitted to CCU for CHB in s/o hyperkalemia & in the presence of conduction disease/RBBB & LAHB - stable w/ temporary wire. ??Septic shock deemed to be d/t UTI - ?? UA with only 7 WBCs); vasopressors weaned off. HTN; started NG gtt. Acute on chronic hypoxic/hypercapnic RF & obtunded; intubated. Successfully extubated 04/08. MARQUEZ on CKD; improved w/ good UOP response to diuresis. PVCs w/ intermittent pacing by TVP. Switched pip-tazo to amox-clav on 04/09; continued to improve; empiric EED 04/13 for 7 day course. Micra PM by EP placed 04/10. Medically stable for stepdown. Patient was extremely short of breath with physical therapy, chest x-ray concerning for pulmonary edema.  Started patient on IV diuresis, now euvolemic.  ENT was consulted yesterday due to hoarseness of voice.  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 Pt uninterested in any procedure at this time, ENT recommended SLP follow up.  Able to tolerate regular diet at this point.  Now agreeable for SNF, working on placement. Course c/b worsening hypoxia & hyperNa.      Overnight 4/18-4/19, decompensated with recurrent sepsis, hypothermia 92, possible PNA with infiltrates on CXR, Zosyn IV started. Remained hypothermic despite Zosyn x 2 doses and Manny hugger in place (pt uncomfortable/hot and asking for removal), so added Vanc IV STAT. Checked TSH and FT4-  wnl. UA with pyuria however not a clean catch w 33 sq epi, so repeated UA. C/o "stomach" pain, overall picture with the "stomach" pain and septic picture is "exactly what led us to ICU 2 weeks ago" per daughter. +TTP RUQ, RLQ, LLQ.   Poor UOP, only 200cc by mid-shift, and still hyperNa with FWD 1.8L so started gentle hydration with D5W @ 75cc for 24 hrs. May need to diurese again after sepsis resolves. CT C/A/P noncon with pulm edema, B pleural effusions, possible PNA, no " intra-abdominal process seen.     On 4/21, Mental status much improved, however still with mild confusion and feels very tired.   decompensated further with recurrent MARQUEZ, C/o new SOB and hypoxemic 90% on 4L (over her baseline of 2L at home), improved to >94% on 6L with improvement of SOB.  C/o new L chest pressure/pain 4/10 (non-radiating), c/f MI or PE, resolved after nitro SL x 1 however HypoT with SBP upper 80s so will avoid further nitro.  HS trop 38 (decreased from prior), BNP 700s (increased from prior).CXR with pulm edema, all most c/w ADHF- Lasix 40 IV x 1 given.    Concern for untreated sepsis with ongoing hypothermia requiring jeffrey hugger despite Vanc/Zosyn for over 24 hours, so broadened to Vanc/Sher/Dinah, consulted ID, repeated BCx x 2. Lactate wnl.  Re: concern for PE, Known LIJ DVT, apixaban x several days then stopped (likely for hemorrhage of vocal fold)- discussed w ENT 4/20, ok to resume anticoag if needed. PE workup- F/u STAT UE and LE Dopplers. Unable to check CTA chest given MARQUEZ, unable to check V/Q given pulm edema. Anticoagulation with Apixaban.  Hypothermia returned however pt looks and feels the best she has in 4 days.  Voice is getting stronger.  Sitting in chair.  UCx resulted with ESBL Klebsiella, so Sher has been good coverage.  ID changed to Dapto (from Vanc), continuing Sher, for coverage of UTI and well as PNA.  BCx have all been negative.  Continuing Eliquis for DVt LUE and presumed PE.  Acute hypox resp failure 2/2 ADHF and presumed PE, still on 6L, dosing Lasix IV PRN daily given yet-unclear status of her sepsis and avoiding volume depletion in sepsis.      Prior to admit, was independent in her residence with her cat.  Two daughters very supportive.        Interval History:  Nuclear study report noted, ID is input noted.  Complain of some chest pain yesterday with notable hypertension, improved with blood pressure improvement.  Today complains of some abdominal pain, transient  shortness of breath and chest discomfort.  Sodium slightly elevated at 147.  Family to decide with the  route of SNF or palliative care    Review of Systems   Unable to perform ROS: Other     Objective:     Vital Signs (Most Recent):  Temp: 97.6 °F (36.4 °C) (04/26/25 1524)  Pulse: 85 (04/26/25 1524)  Resp: 19 (04/26/25 1524)  BP: (!) 116/57 (04/26/25 1524)  SpO2: (!) 94 % (04/26/25 1524) Vital Signs (24h Range):  Temp:  [94.7 °F (34.8 °C)-98.3 °F (36.8 °C)] 97.6 °F (36.4 °C)  Pulse:  [68-87] 85  Resp:  [18-20] 19  SpO2:  [88 %-96 %] 94 %  BP: ()/(52-82) 116/57     Weight: 68.8 kg (151 lb 10.8 oz)  Body mass index is 26.04 kg/m².    Intake/Output Summary (Last 24 hours) at 4/26/2025 1548  Last data filed at 4/26/2025 1502  Gross per 24 hour   Intake 468.7 ml   Output 601 ml   Net -132.3 ml              Physical Exam  Constitutional:       Appearance: She is ill-appearing.   HENT:      Head: Normocephalic.      Right Ear: External ear normal.      Left Ear: External ear normal.      Nose: Nose normal.      Comments: Nasal cannula  Cardiovascular:      Rate and Rhythm: Normal rate.      Heart sounds: Normal heart sounds.   Pulmonary:      Breath sounds: Normal breath sounds.   Abdominal:      Palpations: Abdomen is soft.      Tenderness: There is no abdominal tenderness.   Musculoskeletal:      Right lower leg: No edema.      Left lower leg: No edema.   Skin:     Findings: Bruising present.   Neurological:      Mental Status: She is alert. Mental status is at baseline.              Significant Labs: All pertinent labs within the past 24 hours have been reviewed.    Significant Imaging: I have reviewed all pertinent imaging results/findings within the past 24 hours.      Assessment & Plan  Complete heart block  RBBB  Shock  -Known RBBB. Admitted with CHB and shock.  Bradycardic with HR in the 20s on arrival, transcutaneous pacing/epi initiated, admitted to CCU  - bradycardia most likely in the setting of  electrolyte disturbance and abnormal thyroid function  Plan:  -s/p micra PM placement 4/10 by EP  -correct electrolyte derangements  Sepsis  Hypothermia  Cont Sher/Dapto for now  - f/u NM tagged WBC scan whole body   - f/u BCx and UCx  - f/u ID recs    4/24  Follow up nuclear studies.  Palliative Care on board.  Follow up with Infectious Disease  Cont Sher/Dapto for now  - f/u NM tagged WBC scan whole body   - f/u BCx and UCx  - f/u ID recs    4/26  Nuclear studies report and ID input noted.  To complete antibiotics tomorrow  Cont Sher/Dapto for now  - f/u NM tagged WBC scan whole body   - f/u BCx and UCx  - f/u ID recs    4/24  Follow up nuclear studies.  Palliative Care on board.  Follow up with Infectious Disease  Cont Sher/Dapto for now  - f/u NM tagged WBC scan whole body   - f/u BCx and UCx  - f/u ID recs  Acute pulmonary embolism  Left chest pressure  Acute deep vein thrombosis (DVT) of left upper extremity  PRESUMED PE (unable to do imaging at this time as risk > benefit)  CONFIRMED LUE DVT  - Apixaban 10 BID x 7 days then 5 BID  - TTE with mild R heart strain  Hypernatremia  Very slowly uptrending Na since admission. Peak 150 on 4/17 for which pt received 250cc LR. Na 149 the following day & pt w/ increased O2 needs & LE edema. Unclear etiology as pt does not appear hypovolemic.   - encourage water PO  - Consider Nephrology consult  - Monitor BMP Q12-24H     4/24  Resolved  Dysphonia  Paralysis of right vocal cord  -Persistent hoarseness since extubation on 4/8  -ENT consulted, status post laryngoscopy 4/15 which noted right TDC paralysis with mild hemorrhage of true cord, adequate glottic closure noted  -Pt uninterested in VC augmentation at this time, needs outpatient SLP follow up  Typical atrial flutter  -s/p ablation 6/2024 without recurrence  -AC discontinued by outpatient EP provider  -Cont BB  - of note, on Eliquis for VTE (not for Afib)  CKD (chronic kidney disease) stage 3, GFR 30-59  "ml/min  Creatine stable for now. BMP reviewed- noted Estimated Creatinine Clearance: 52.5 mL/min (based on SCr of 0.8 mg/dL). according to latest data. Based on current GFR, CKD stage is stage 4 - GFR 15-29.  Monitor UOP and serial BMP and adjust therapy as needed. Renally dose meds. Avoid nephrotoxic medications and procedures.    Centrilobular emphysema  Prior history of smoking. Continue scheduled inhalers once extubated. Antibiotics and Supplemental oxygen and monitor respiratory status closely.     Chronic respiratory failure with hypoxia  Patient with Hypoxic Respiratory failure which is Chronic.  she is on home oxygen at 2 LPM. Supplemental oxygen was provided and noted-       Signs/symptoms of respiratory failure include- tachypnea, increased work of breathing, use of accessory muscles, and lethargy. Contributing diagnoses includes - underlying COPD   -transition to home bumex    Acute on chronic congestive heart failure  Most recent BNP and echo results are listed below.  No results for input(s): "BNP" in the last 72 hours.     Echo  Result Date: 4/21/2025    Left Ventricle: The left ventricle is normal in size. Normal wall   thickness. There is normal systolic function with a visually estimated   ejection fraction of 55 - 60%.    Right Ventricle: The right ventricle has mild enlargement. Systolic   function is mildly reduced.    Aortic Valve: There is moderate aortic valve sclerosis. There is mild   annular calcification present. Mildly restricted motion.    Mitral Valve: There is severe mitral annular calcification.    Tricuspid Valve: There is mild regurgitation.    Pulmonary Artery: There is moderate pulmonary hypertension. The   estimated pulmonary artery systolic pressure is 51 mmHg.    IVC/SVC: Elevated venous pressure at 15 mmHg.    Left pleural effusion.      Current Heart Failure Medications: have been held due to bradycardia and MARQUEZ     Plan  - Lasix 80 IV x 1, re-dose daily after clinical eval, " "slow diuresis while still actively in sepsis  - HOLD home hydralazine for same reason  - cont home metop for rate control in afib  - Monitor strict I&Os and daily weights.  - Monitor electrolytes, replete PRN for goal K > 4 & Mg > 2  - Telemetry    Orthostatic hypotension  holding midodrine and per history patient had not required it since last hospitalization     Iron deficiency anemia due to chronic blood loss  Anemia is likely due to chronic disease due to Chronic Kidney Disease. Most recent hemoglobin and hematocrit are listed below.  Recent Labs     04/24/25  0524 04/25/25  1112 04/26/25  0439   HGB 12.0 11.9* 12.3   HCT 37.5 37.6 39.6     Plan  - Monitor serial CBC: Daily  - Transfuse PRBC if patient becomes hemodynamically unstable, symptomatic or H/H drops below 7/21.  - Patient has not received any PRBC transfusions to date  - Patient's anemia is currently stable    Subclinical hypothyroidism  Per Endocrine consult, "Mild, subclinical hypothyroidism - unlikely to be the cause of shock or mental status changes especially given normal fT4. Can start low dose LT4 per OGT, but in cardiac pts and elderly, better to under treat rather than over-treat -- therefore conservative/low dose LT4 recommended. Needs TSH repeated outpatient in 4wks."    Left knee pain  Appreciate Ortho consult 4/21  Cont Lido patch    Cystitis      Pleural effusion      Anticoagulated      Pulmonary hypertension      Other reduced mobility      Severe protein-calorie malnutrition  Interventions/Recommendations (treatment strategy):  1. Continue low Na diet as tolerated    - please continue to document PO % intake via flowsheets   2. Continue boost plus TID 3. Encourage good intake  4. RD to monitor weight, labs, intake, tolerance    Palliative care encounter  Complex medical history with overall poor prognosis.  Significant debility.  Notable pleural effusions ?  Contributing to some degree of shortness of breath and chest discomfort.  Pan " "CT done recently no reports of intra-abdominal pathology to account for abdominal pain.  Intermittent hypernatremia ?  Poor oral intake.  Difficulties with fluid status management.  Recommending palliative care at this time, however family to decide whether or not they want to do skilled nursing facility or pursue palliative care/hospice.    VTE Risk Mitigation (From admission, onward)           Ordered     apixaban tablet 5 mg  2 times daily        Placed in "Followed by" Linked Group    04/20/25 1900     apixaban tablet 10 mg  2 times daily        Placed in "Followed by" Linked Group    04/20/25 1900     IP VTE HIGH RISK PATIENT  Once         04/06/25 0512     Place sequential compression device  Until discontinued         04/06/25 0454                    Discharge Planning   NOHEMI: 4/29/2025     Code Status: DNR   Medical Readiness for Discharge Date:   Discharge Plan A: Skilled Nursing Facility   Discharge Delays: (!) Payor Issues            Please place Justification for DME        Uche Antunez MD  Department of Hospital Medicine   [facility] - [facility] Stepdown    "

## 2025-04-26 NOTE — PROGRESS NOTES
Pt c/o chest pain (see inserted table). Notified provider. EKG, Troponin level obtained.  Nitro given (See MAR). Pt currently resting with eyes closed. No noted distress or discomfort at this time. Daughter at bedside. Provider gave orders and stated in route to bedside to speak with family and assess pt. Pt warming blanket maintained.     04/26/25 1018 04/26/25 1025 04/26/25 1045   Chest Pain Assessment   Chest Pain Location anterior chest, left  --   --    Rating (0-10) 6  --   --    Chest Pain Radiation other (see comments)  (none)  --   --    Character pressure  --   --    Precipitating Factors nothing  --   --    Associated Signs/Symptoms anxiety  --   --    Chest Pain Intervention 12-lead ECG obtained;nitroglycerin SL given;other (see comments)  (oygen monitoring)  --   --    Chest Pain Response to Intervention (Pain Rating 0-10)  --  5 2

## 2025-04-26 NOTE — ASSESSMENT & PLAN NOTE
Creatine stable for now. BMP reviewed- noted Estimated Creatinine Clearance: 52.5 mL/min (based on SCr of 0.8 mg/dL). according to latest data. Based on current GFR, CKD stage is stage 4 - GFR 15-29.  Monitor UOP and serial BMP and adjust therapy as needed. Renally dose meds. Avoid nephrotoxic medications and procedures.

## 2025-04-26 NOTE — ASSESSMENT & PLAN NOTE
"Dr Muñoz notified and informed of pts arrival, SROM clear at 2330 per report from pt, +nitrazine,SVE 3/75/-2, COVID negative, FHR status is reactive, no ctxs, pt denies pain, +GBS status, and pt requesting "no pitocin".  Received routine admission orders, and to start Ampicillin per GBS protocol. Order also received to start pitocin and to relay to the patient the increased risk of infection/chorio associated with prolonged ROM and +GBS as well as the risks to the infant.   Discussed above with the patient who verbalized understanding of the risks involved and she still refuses Pitocin at this time. Pt states she understands that she will need Pitocin eventually and agrees to have it started later, but she does not want it now.  " Prior history of smoking. Continue scheduled inhalers once extubated. Antibiotics and Supplemental oxygen and monitor respiratory status closely.

## 2025-04-26 NOTE — ASSESSMENT & PLAN NOTE
Complex medical history with overall poor prognosis.  Significant debility.  Notable pleural effusions ?  Contributing to some degree of shortness of breath and chest discomfort.  Pan CT done recently no reports of intra-abdominal pathology to account for abdominal pain.  Intermittent hypernatremia ?  Poor oral intake.  Difficulties with fluid status management.  Recommending palliative care at this time, however family to decide whether or not they want to do skilled nursing facility or pursue palliative care/hospice.

## 2025-04-26 NOTE — ASSESSMENT & PLAN NOTE
Cont Sher/Dapto for now  - f/u NM tagged WBC scan whole body   - f/u BCx and UCx  - f/u ID recs    4/24  Follow up nuclear studies.  Palliative Care on board.  Follow up with Infectious Disease  Cont Sher/Dapto for now  - f/u NM tagged WBC scan whole body   - f/u BCx and UCx  - f/u ID recs    4/26  Nuclear studies report and ID input noted.  To complete antibiotics tomorrow  Cont Sher/Dapto for now  - f/u NM tagged WBC scan whole body   - f/u BCx and UCx  - f/u ID recs    4/24  Follow up nuclear studies.  Palliative Care on board.  Follow up with Infectious Disease  Cont Sher/Dapto for now  - f/u NM tagged WBC scan whole body   - f/u BCx and UCx  - f/u ID recs

## 2025-04-27 NOTE — ASSESSMENT & PLAN NOTE
Creatine stable for now. BMP reviewed- noted Estimated Creatinine Clearance: 43 mL/min (based on SCr of 1 mg/dL). according to latest data. Based on current GFR, CKD stage is stage 4 - GFR 15-29.  Monitor UOP and serial BMP and adjust therapy as needed. Renally dose meds. Avoid nephrotoxic medications and procedures.

## 2025-04-27 NOTE — PT/OT/SLP PROGRESS
Physical Therapy Treatment/Co-Treatment with O.T.    Patient Name:  Misa Barajas   MRN:  [id number]    Recommendations:     Discharge Recommendations: Moderate Intensity Therapy  Discharge Equipment Recommendations: walker, rolling  Barriers to discharge: Inaccessible home    Assessment:     Misa Barajas is a 79 y.o. female admitted with a medical diagnosis of Palliative care encounter.  She presents with the following impairments/functional limitations: weakness, impaired endurance, impaired self care skills, impaired functional mobility, gait instability, impaired balance, decreased coordination, impaired cardiopulmonary response to activity . Patient initially required encouragement to mobilize a little. Noted more difficulty transferring from sit to stand today, as Patient c/o fatigue.    Rehab Prognosis: Fair; patient would benefit from acute skilled PT services to address these deficits and reach maximum level of function.    Recent Surgery: Procedure(s) (LRB):  INSERTION, CARDIAC PACEMAKER, LEADLESS (N/A)  Removal, Pacemaker, Temporary Transvenous 17 Days Post-Op    Plan:     During this hospitalization, patient to be seen 4 x/week to address the identified rehab impairments via gait training, therapeutic activities, therapeutic exercises, neuromuscular re-education and progress toward the following goals:    Plan of Care Expires:  05/13/25    Subjective     Chief Complaint: fatigue and no appetite.  Patient/Family Comments/goals: to have a little more energy.  Pain/Comfort:  Pain Rating 1: 0/10  Pain Rating Post-Intervention 1: 0/10      Objective:     Communicated with NSG prior to session.  Patient found HOB elevated with peripheral IV, telemetry, oxygen, pulse ox (continuous) upon PT entry to room.     General Precautions: Standard, fall  Orthopedic Precautions: N/A  Braces: N/A  Respiratory Status: Nasal cannula, flow 5 L/min     Functional Mobility:  Bed Mobility:     Rolling Right: contact guard  assistance  Scooting: moderate assistance and of 2 persons  Supine to Sit: contact guard assistance and minimum assistance  Sit to Supine: minimum assistance and of 2 persons  Transfers:     Sit to Stand:  moderate assistance with rolling walker  Gait: 4 steps to HOB with RW and min of 2.       AM-PAC 6 CLICK MOBILITY  Turning over in bed (including adjusting bedclothes, sheets and blankets)?: 3  Sitting down on and standing up from a chair with arms (e.g., wheelchair, bedside commode, etc.): 2  Moving from lying on back to sitting on the side of the bed?: 3  Moving to and from a bed to a chair (including a wheelchair)?: 2  Need to walk in hospital room?: 2  Climbing 3-5 steps with a railing?: 1  Basic Mobility Total Score: 13       Treatment & Education:  Co-Treatment with O.T. Patient sat at EOB with Supervision only and performed self care activity. Static standing balance 2 x 25secs with RW and min assistance. Transferred back to bed and repositioned for comfort.    Patient left HOB elevated with all lines intact, call button in reach, and daughter present.    GOALS:   Multidisciplinary Problems       Physical Therapy Goals          Problem: Physical Therapy    Goal Priority Disciplines Outcome Interventions   Physical Therapy Goal     PT, PT/OT Progressing    Description: PT goals until 5/13/25    1. Pt supine to sit with CGA - met 4/22/2025  Updated: supervision  2. Pt sit to supine with CGA-not met  3. Pt sit to stand with RW with CGA-not met  4. Pt to perform gait 10ft with RW with CGA.-not met  5. Pt to transfer bed to/from bedside chair with HHA with minimal assist.-not met  6. Pt to up/down 1 step with RW with minimal assist.-not met  7. Pt to perform B LE exs in sitting or supine x 10 reps to strengthen B LE to improve functional mobility.-not met                         DME Justifications:   Misa's mobility limitation cannot be sufficiently resolved by the use of a cane. Her functional mobility  deficit can be sufficiently resolved with the use of a Rolling Walker. Patient's mobility limitation significantly impairs their ability to participate in one of more activities of daily living.  The use of a RW will significantly improve the patient's ability to participate in MRADLS and the patient will use it on regular basis in the home.    Time Tracking:     PT Received On: 04/27/25  PT Start Time: 1123     PT Stop Time: 1146  PT Total Time (min): 23 min     Billable Minutes: Therapeutic Activity 23    Treatment Type: Treatment  PT/PTA: PTA     Number of PTA visits since last PT visit: 3     04/27/2025

## 2025-04-27 NOTE — SUBJECTIVE & OBJECTIVE
Interval History:  Requires 5 L of oxygen.  Sodium of 149.  Daughters to meet today to decide disposition, reiterated overall poor prognosis.  Scopolamine patch ordered for nausea and vomiting    Review of Systems   Unable to perform ROS: Other     Objective:     Vital Signs (Most Recent):  Temp: 97.4 °F (36.3 °C) (04/27/25 0754)  Pulse: 82 (04/27/25 0829)  Resp: 18 (04/27/25 0754)  BP: (!) 158/70 (04/27/25 0829)  SpO2: (!) 94 % (04/27/25 0754) Vital Signs (24h Range):  Temp:  [96.8 °F (36 °C)-97.8 °F (36.6 °C)] 97.4 °F (36.3 °C)  Pulse:  [72-96] 82  Resp:  [18-22] 18  SpO2:  [88 %-96 %] 94 %  BP: ()/(52-73) 158/70     Weight: 72.5 kg (159 lb 13.3 oz)  Body mass index is 27.44 kg/m².    Intake/Output Summary (Last 24 hours) at 4/27/2025 1008  Last data filed at 4/27/2025 0600  Gross per 24 hour   Intake 120 ml   Output 300 ml   Net -180 ml              Physical Exam  Constitutional:       Appearance: She is ill-appearing.   HENT:      Head: Normocephalic.      Right Ear: External ear normal.      Left Ear: External ear normal.      Nose: Nose normal.      Comments: Nasal cannula  Cardiovascular:      Rate and Rhythm: Normal rate.      Heart sounds: Normal heart sounds.   Pulmonary:      Breath sounds: Normal breath sounds.   Abdominal:      Palpations: Abdomen is soft.      Tenderness: There is no abdominal tenderness.   Musculoskeletal:      Right lower leg: No edema.      Left lower leg: No edema.   Skin:     Findings: Bruising present.   Neurological:      Mental Status: She is alert. Mental status is at baseline.              Significant Labs: All pertinent labs within the past 24 hours have been reviewed.

## 2025-04-27 NOTE — ASSESSMENT & PLAN NOTE
Anemia is likely due to chronic disease due to Chronic Kidney Disease. Most recent hemoglobin and hematocrit are listed below.  Recent Labs     04/25/25  1112 04/26/25  0439 04/27/25  0541   HGB 11.9* 12.3 12.0   HCT 37.6 39.6 38.7     Plan  - Monitor serial CBC: Daily  - Transfuse PRBC if patient becomes hemodynamically unstable, symptomatic or H/H drops below 7/21.  - Patient has not received any PRBC transfusions to date  - Patient's anemia is currently stable

## 2025-04-27 NOTE — PT/OT/SLP PROGRESS
Occupational Therapy   Co-Treatment  Co-evaluation/treatment performed due to patient's multiple deficits requiring two skilled therapists to appropriately and safely assess patient's strength, endurance, functional mobility, and ADL performance while facilitating functional tasks in addition to accommodating for patient's activity tolerance and medical acuity.     Name: Misa Barajas  MRN: [id number]  Admitting Diagnosis:  Palliative care encounter  17 Days Post-Op    Recommendations:     Discharge Recommendations: Moderate Intensity Therapy  Discharge Equipment Recommendations:  walker, rolling  Barriers to discharge:  Decreased caregiver support, Other (Comment) (increased skilled A needed)    Assessment:     Misa Barajas is a 79 y.o. female with a medical diagnosis of Palliative care encounter.  She presents with fair participation and low motivation for presented therapeutic interventions. Pt provided with therapeutic use of self, positive reinforcement, cognitive behavioral interventions, and positive thinking strategies in efforts to address pt's current psychosocial well being within the OT scope of practice. Pt with highly impaired standing/activity tolerance this date. Pt is currently not at her PLOF and would greatly benefit from continued skilled OT intervention in efforts to participate in meaningful occupations of choice at the highest level of independence.   Performance deficits affecting function are weakness, impaired endurance, impaired self care skills, impaired functional mobility, gait instability, decreased upper extremity function, decreased lower extremity function, impaired cognition, decreased safety awareness, impaired cardiopulmonary response to activity.     Rehab Prognosis:  Fair; patient would benefit from acute skilled OT services to address these deficits and reach maximum level of function.       Plan:     Patient to be seen 4 x/week to address the above listed problems via  self-care/home management, therapeutic activities, therapeutic exercises, neuromuscular re-education  Plan of Care Expires: 05/13/25  Plan of Care Reviewed with: patient, daughter    Subjective     Chief Complaint: none stated  Patient/Family Comments/goals: pt agreeable to session  Pain/Comfort:  Pain Rating 1: 0/10  Pain Rating Post-Intervention 1: 0/10    Objective:     Communicated with: RN prior to session.  Patient found HOB elevated with peripheral IV, telemetry, oxygen, pulse ox (continuous) upon OT entry to room.    General Precautions: Standard, fall    Orthopedic Precautions:N/A  Braces: N/A  Respiratory Status: Nasal cannula, flow 5 L/min     Occupational Performance:     Bed Mobility:     Rolling Right: contact guard assistance  Scooting: moderate assistance and of 2 persons  Supine to Sit: contact guard assistance and minimum assistance  Sit to Supine: minimum assistance and of 2 persons    Functional Mobility/Transfers:  Patient completed Sit <> Stand Transfer with moderate assistance  with  rolling walker   Functional Mobility: Pt engaging in functional mobility to simulate household/community distances approx 2-3 lateral steps with MOD A and utilizing RW in order to maximize functional activity tolerance and standing balance required for engagement in occupations of choice.     Activities of Daily Living:  Grooming: set up A oral hygiene      [facility] 6 Click ADL: 12    Treatment & Education:  Pt educated on the following:  - role of OT and OT POC, including DC from OT. Pt verbalized understanding.  - importance of continued mobilization  - Safe transfer techniques and proper body mechanics for fall prevention and improved independence with functional transfers   - Importance of OOB activities to increase endurance and tolerance for increased participation in daily ADLs.    - All pt questions within OT scope of practice addressed, pt verbalized understanding.     Patient left HOB elevated with all  lines intact, call button in reach, RN notified, and dtr present    GOALS:   Multidisciplinary Problems       Occupational Therapy Goals          Problem: Occupational Therapy    Goal Priority Disciplines Outcome Interventions   Occupational Therapy Goal     OT, PT/OT Progressing    Description: Goals to be met by: 5/13/2025     Patient will increase functional independence with ADLs by performing:    UE Dressing with Minimal Assistance.  LE Dressing with Moderate Assistance.  Grooming while standing at sink with Contact Guard Assistance.  Toileting from bedside commode with Minimal Assistance for hygiene and clothing management.   Step transfer with Moderate Assistance  Toilet transfer to bedside commode with Moderate Assistance.                         DME Justifications:   Misa's mobility limitation cannot be sufficiently resolved by the use of a cane. Her functional mobility deficit can be sufficiently resolved with the use of a Rolling Walker. Patient's mobility limitation significantly impairs their ability to participate in one of more activities of daily living.  The use of a RW will significantly improve the patient's ability to participate in MRADLS and the patient will use it on regular basis in the home.    Time Tracking:     OT Date of Treatment: 04/27/25  OT Start Time: 1123  OT Stop Time: 1146  OT Total Time (min): 23 min    Billable Minutes:Self Care/Home Management 10  Neuromuscular Re-education 13    OT/MARYA: OT     Number of MARYA visits since last OT visit: 0    4/27/2025

## 2025-04-27 NOTE — PROGRESS NOTES
[facility] - Cardiology [facility] [place] Medicine  Progress Note    Patient Name: Misa Barajas  MRN: [id number]  Patient Class: IP- Inpatient   Admission Date: 4/6/2025  Length of Stay: 21 days  Attending Physician: Uche Antunez MD  Primary Care Provider: Isela Langston MD        Subjective     Principal Problem:Palliative care encounter        HPI:  Ms. Misa Barajas is a 78 yo female with COPD on 2L home O2, HFpEF, AFL s/p RFA (6/17/24 w/ Dr. Church), CKD III, HTN, HLD, chronic venous insufficiency, orthostatic hypotension who was brought via EMS after per history she was found confused and bradycardiac with HR in the 20s.They started epi and transcutaneous pacing. Given Versed for the transcutaneous pacing. They had to bag her in route. We do not have strip of underlying rhythm. Patient obtunded for which she was intubated for airway protection on arrival to the ED and transcutaneous pacing was exchanged to transvenous pacing via RIJ (threshold 0.6). Underlying rate 44 bifascicular block. Known RBBB. Per daughters patient had called them over the phone when they noticed she appeared confused. Due to concerns for a possible syncopal episode EMS was called. Prior to event patient had been complaining of malaise and nausea for which she took about two Zofran pills per the daughters.      She had been recently seen on 3/27/25 at the [facility] visit following recent admission for AHRF 2/2 ADHF and COPD exacerbation. On ED presentation BNP elevated to 2500, troponin elevated to 19, CXR with pulmonary edema. She was started on steroids, nebs and antibiotics in addition to IV diuresis. Updated TTE stable from prior, showing HFpEF with elevated CVP 15. Her O2 requirements improved to baseline with diuresis and she was subsequently discharged. At that visit patient had not required midodrine as blood pressure had remained stable and she was asymptomatic.      Off note, patient was taken off OAC due to bleeding issues  "and no recurrent of typical atrial flutter after ablation.     Overview/Hospital Course:  Admitted to CCU for CHB in s/o hyperkalemia & in the presence of conduction disease/RBBB & LAHB - stable w/ temporary wire. ??Septic shock deemed to be d/t UTI - ?? UA with only 7 WBCs); vasopressors weaned off. HTN; started NG gtt. Acute on chronic hypoxic/hypercapnic RF & obtunded; intubated. Successfully extubated 04/08. MARQUEZ on CKD; improved w/ good UOP response to diuresis. PVCs w/ intermittent pacing by TVP. Switched pip-tazo to amox-clav on 04/09; continued to improve; empiric EED 04/13 for 7 day course. Micra PM by EP placed 04/10. Medically stable for stepdown. Patient was extremely short of breath with physical therapy, chest x-ray concerning for pulmonary edema.  Started patient on IV diuresis, now euvolemic.  ENT was consulted yesterday due to hoarseness of voice.  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 Pt uninterested in any procedure at this time, ENT recommended SLP follow up.  Able to tolerate regular diet at this point.  Now agreeable for SNF, working on placement. Course c/b worsening hypoxia & hyperNa.      Overnight 4/18-4/19, decompensated with recurrent sepsis, hypothermia 92, possible PNA with infiltrates on CXR, Zosyn IV started. Remained hypothermic despite Zosyn x 2 doses and Manny hugger in place (pt uncomfortable/hot and asking for removal), so added Vanc IV STAT. Checked TSH and FT4-  wnl. UA with pyuria however not a clean catch w 33 sq epi, so repeated UA. C/o "stomach" pain, overall picture with the "stomach" pain and septic picture is "exactly what led us to ICU 2 weeks ago" per daughter. +TTP RUQ, RLQ, LLQ.   Poor UOP, only 200cc by mid-shift, and still hyperNa with FWD 1.8L so started gentle hydration with D5W @ 75cc for 24 hrs. May need to diurese again after sepsis resolves. CT C/A/P noncon with pulm edema, B pleural effusions, possible PNA, no " intra-abdominal process seen.     On 4/21, Mental status much improved, however still with mild confusion and feels very tired.   decompensated further with recurrent MARQUEZ, C/o new SOB and hypoxemic 90% on 4L (over her baseline of 2L at home), improved to >94% on 6L with improvement of SOB.  C/o new L chest pressure/pain 4/10 (non-radiating), c/f MI or PE, resolved after nitro SL x 1 however HypoT with SBP upper 80s so will avoid further nitro.  HS trop 38 (decreased from prior), BNP 700s (increased from prior).CXR with pulm edema, all most c/w ADHF- Lasix 40 IV x 1 given.    Concern for untreated sepsis with ongoing hypothermia requiring jeffrey hugger despite Vanc/Zosyn for over 24 hours, so broadened to Vanc/Sher/Dinah, consulted ID, repeated BCx x 2. Lactate wnl.  Re: concern for PE, Known LIJ DVT, apixaban x several days then stopped (likely for hemorrhage of vocal fold)- discussed w ENT 4/20, ok to resume anticoag if needed. PE workup- F/u STAT UE and LE Dopplers. Unable to check CTA chest given MARQUEZ, unable to check V/Q given pulm edema. Anticoagulation with Apixaban.  Hypothermia returned however pt looks and feels the best she has in 4 days.  Voice is getting stronger.  Sitting in chair.  UCx resulted with ESBL Klebsiella, so Sher has been good coverage.  ID changed to Dapto (from Vanc), continuing Sher, for coverage of UTI and well as PNA.  BCx have all been negative.  Continuing Eliquis for DVt LUE and presumed PE.  Acute hypox resp failure 2/2 ADHF and presumed PE, still on 6L, dosing Lasix IV PRN daily given yet-unclear status of her sepsis and avoiding volume depletion in sepsis.      Prior to admit, was independent in her residence with her cat.  Two daughters very supportive.        Interval History:  Requires 5 L of oxygen.  Sodium of 149.  Daughters to meet today to decide disposition, reiterated overall poor prognosis.  Scopolamine patch ordered for nausea and vomiting    Review of Systems   Unable to  perform ROS: Other     Objective:     Vital Signs (Most Recent):  Temp: 97.4 °F (36.3 °C) (04/27/25 0754)  Pulse: 82 (04/27/25 0829)  Resp: 18 (04/27/25 0754)  BP: (!) 158/70 (04/27/25 0829)  SpO2: (!) 94 % (04/27/25 0754) Vital Signs (24h Range):  Temp:  [96.8 °F (36 °C)-97.8 °F (36.6 °C)] 97.4 °F (36.3 °C)  Pulse:  [72-96] 82  Resp:  [18-22] 18  SpO2:  [88 %-96 %] 94 %  BP: ()/(52-73) 158/70     Weight: 72.5 kg (159 lb 13.3 oz)  Body mass index is 27.44 kg/m².    Intake/Output Summary (Last 24 hours) at 4/27/2025 1008  Last data filed at 4/27/2025 0600  Gross per 24 hour   Intake 120 ml   Output 300 ml   Net -180 ml              Physical Exam  Constitutional:       Appearance: She is ill-appearing.   HENT:      Head: Normocephalic.      Right Ear: External ear normal.      Left Ear: External ear normal.      Nose: Nose normal.      Comments: Nasal cannula  Cardiovascular:      Rate and Rhythm: Normal rate.      Heart sounds: Normal heart sounds.   Pulmonary:      Breath sounds: Normal breath sounds.   Abdominal:      Palpations: Abdomen is soft.      Tenderness: There is no abdominal tenderness.   Musculoskeletal:      Right lower leg: No edema.      Left lower leg: No edema.   Skin:     Findings: Bruising present.   Neurological:      Mental Status: She is alert. Mental status is at baseline.              Significant Labs: All pertinent labs within the past 24 hours have been reviewed.      Assessment & Plan  Complete heart block  RBBB  Shock  -Known RBBB. Admitted with CHB and shock.  Bradycardic with HR in the 20s on arrival, transcutaneous pacing/epi initiated, admitted to CCU  - bradycardia most likely in the setting of electrolyte disturbance and abnormal thyroid function  Plan:  -s/p micra PM placement 4/10 by EP  -correct electrolyte derangements  Sepsis  Hypothermia  Cont Sher/Dapto for now  - f/u NM tagged WBC scan whole body   - f/u BCx and UCx  - f/u ID recs    4/24  Follow up nuclear studies.   Palliative Care on board.  Follow up with Infectious Disease  Cont Sher/Dapto for now  - f/u NM tagged WBC scan whole body   - f/u BCx and UCx  - f/u ID recs    4/26  Nuclear studies report and ID input noted.  To complete antibiotics tomorrow  Cont Sher/Dapto for now  - f/u NM tagged WBC scan whole body   - f/u BCx and UCx  - f/u ID recs    4/24  Follow up nuclear studies.  Palliative Care on board.  Follow up with Infectious Disease  Cont Sher/Dapto for now  - f/u NM tagged WBC scan whole body   - f/u BCx and UCx  - f/u ID recs  Acute pulmonary embolism  Left chest pressure  Acute deep vein thrombosis (DVT) of left upper extremity  PRESUMED PE (unable to do imaging at this time as risk > benefit)  CONFIRMED LUE DVT  - Apixaban 10 BID x 7 days then 5 BID  - TTE with mild R heart strain  Hypernatremia  Very slowly uptrending Na since admission. Peak 150 on 4/17 for which pt received 250cc LR. Na 149 the following day & pt w/ increased O2 needs & LE edema. Unclear etiology as pt does not appear hypovolemic.   - encourage water PO  - Consider Nephrology consult  - Monitor BMP Q12-24H     4/24  Resolved  Dysphonia  Paralysis of right vocal cord  -Persistent hoarseness since extubation on 4/8  -ENT consulted, status post laryngoscopy 4/15 which noted right TDC paralysis with mild hemorrhage of true cord, adequate glottic closure noted  -Pt uninterested in VC augmentation at this time, needs outpatient SLP follow up  Typical atrial flutter  -s/p ablation 6/2024 without recurrence  -AC discontinued by outpatient EP provider  -Cont BB  - of note, on Eliquis for VTE (not for Afib)  CKD (chronic kidney disease) stage 3, GFR 30-59 ml/min  Creatine stable for now. BMP reviewed- noted Estimated Creatinine Clearance: 43 mL/min (based on SCr of 1 mg/dL). according to latest data. Based on current GFR, CKD stage is stage 4 - GFR 15-29.  Monitor UOP and serial BMP and adjust therapy as needed. Renally dose meds. Avoid nephrotoxic  "medications and procedures.    Centrilobular emphysema  Prior history of smoking. Continue scheduled inhalers once extubated. Antibiotics and Supplemental oxygen and monitor respiratory status closely.     Chronic respiratory failure with hypoxia  Patient with Hypoxic Respiratory failure which is Chronic.  she is on home oxygen at 2 LPM. Supplemental oxygen was provided and noted-       Signs/symptoms of respiratory failure include- tachypnea, increased work of breathing, use of accessory muscles, and lethargy. Contributing diagnoses includes - underlying COPD   -transition to home bumex    Acute on chronic congestive heart failure  Most recent BNP and echo results are listed below.  No results for input(s): "BNP" in the last 72 hours.     Echo  Result Date: 4/21/2025    Left Ventricle: The left ventricle is normal in size. Normal wall   thickness. There is normal systolic function with a visually estimated   ejection fraction of 55 - 60%.    Right Ventricle: The right ventricle has mild enlargement. Systolic   function is mildly reduced.    Aortic Valve: There is moderate aortic valve sclerosis. There is mild   annular calcification present. Mildly restricted motion.    Mitral Valve: There is severe mitral annular calcification.    Tricuspid Valve: There is mild regurgitation.    Pulmonary Artery: There is moderate pulmonary hypertension. The   estimated pulmonary artery systolic pressure is 51 mmHg.    IVC/SVC: Elevated venous pressure at 15 mmHg.    Left pleural effusion.      Current Heart Failure Medications: have been held due to bradycardia and MARQUEZ     Plan  - Lasix 80 IV x 1, re-dose daily after clinical eval, slow diuresis while still actively in sepsis  - HOLD home hydralazine for same reason  - cont home metop for rate control in afib  - Monitor strict I&Os and daily weights.  - Monitor electrolytes, replete PRN for goal K > 4 & Mg > 2  - Telemetry    Orthostatic hypotension  holding midodrine and per " "history patient had not required it since last hospitalization     Iron deficiency anemia due to chronic blood loss  Anemia is likely due to chronic disease due to Chronic Kidney Disease. Most recent hemoglobin and hematocrit are listed below.  Recent Labs     04/25/25  1112 04/26/25  0439 04/27/25  0541   HGB 11.9* 12.3 12.0   HCT 37.6 39.6 38.7     Plan  - Monitor serial CBC: Daily  - Transfuse PRBC if patient becomes hemodynamically unstable, symptomatic or H/H drops below 7/21.  - Patient has not received any PRBC transfusions to date  - Patient's anemia is currently stable    Subclinical hypothyroidism  Per Endocrine consult, "Mild, subclinical hypothyroidism - unlikely to be the cause of shock or mental status changes especially given normal fT4. Can start low dose LT4 per OGT, but in cardiac pts and elderly, better to under treat rather than over-treat -- therefore conservative/low dose LT4 recommended. Needs TSH repeated outpatient in 4wks."    Left knee pain  Appreciate Ortho consult 4/21  Cont Lido patch    Cystitis      Pleural effusion      Anticoagulated      Pulmonary hypertension      Other reduced mobility      Severe protein-calorie malnutrition  Interventions/Recommendations (treatment strategy):  1. Continue low Na diet as tolerated    - please continue to document PO % intake via flowsheets   2. Continue boost plus TID 3. Encourage good intake  4. RD to monitor weight, labs, intake, tolerance    Palliative care encounter  Complex medical history with overall poor prognosis.  Significant debility.  Notable pleural effusions ?  Contributing to some degree of shortness of breath and chest discomfort.  Pan CT done recently no reports of intra-abdominal pathology to account for abdominal pain.  Intermittent hypernatremia ?  Poor oral intake.  Difficulties with fluid status management.  Recommending palliative care at this time, however family to decide whether or not they want to do skilled nursing " "facility or pursue palliative care/hospice.    VTE Risk Mitigation (From admission, onward)           Ordered     apixaban tablet 5 mg  2 times daily        Placed in "Followed by" Linked Group    04/20/25 1900     IP VTE HIGH RISK PATIENT  Once         04/06/25 0512     Place sequential compression device  Until discontinued         04/06/25 0454                    Discharge Planning   NOHEMI: 4/29/2025     Code Status: DNR   Medical Readiness for Discharge Date:   Discharge Plan A: Skilled Nursing Facility   Discharge Delays: (!) Payor Issues            Please place Justification for DME        Uche Antunez MD  Department of Hospital Medicine   [facility] - Cardiology Stepdown    "

## 2025-04-27 NOTE — PLAN OF CARE
Problem: Skin Injury Risk Increased  Goal: Skin Health and Integrity  Outcome: Progressing     Problem: Adult Inpatient Plan of Care  Goal: Plan of Care Review  Outcome: Progressing     Problem: Fall Injury Risk  Goal: Absence of Fall and Fall-Related Injury  Outcome: Progressing     Problem: Infection  Goal: Absence of Infection Signs and Symptoms  Outcome: Progressing

## 2025-04-28 NOTE — SUBJECTIVE & OBJECTIVE
Interval History: Patient with hypothermia today, requiring jeffrey hugger warming blanket. Patient reports having an restless night. Goals of care discussion continued.     Past Medical History:   Diagnosis Date    Acute biliary pancreatitis 07/27/2024    Acute pancreatitis 07/27/2024    Acute pulmonary embolism 4/21/2025    Anemia 07/12/2024    Aortic atherosclerosis     Arthritis     knee joint pain    Asthma-COPD overlap syndrome 08/22/2022    home o2    Breast cancer 2002    left breast & lymph nodes-s/p sx with chemo    Cataracts, bilateral     Chronic diastolic heart failure 12/24/2019    Chronic kidney disease, stage 3     Class 1 obesity due to excess calories with serious comorbidity and body mass index (BMI) of 30.0 to 30.9 in adult 05/16/2024    Gram-negative bacteremia - Pasturella multocida 07/12/2024    History of chemotherapy     last treatment 12/2002 (had 8 treatments)    Hyperlipidemia 11/20/2016    Hypertension     Lymphedema of both lower extremities 01/25/2022    Nephrolithiasis 06/02/2014    Osteoporosis     Paroxysmal atrial fibrillation 06/07/2021    Renal osteodystrophy 01/14/2016    Sepsis 4/19/2025    Severe protein-calorie malnutrition 4/23/2025    Shock 04/06/2025    Subclinical hypothyroidism 04/06/2025    Venous stasis dermatitis of both lower extremities 01/25/2022    Vitamin D insufficiency        Past Surgical History:   Procedure Laterality Date    ABLATION, ATRIAL FLUTTER, TYPICAL N/A 6/17/2024    Procedure: Ablation, Atrial Flutter, Typical;  Surgeon: Taz Church MD;  Location: [facility] EP LAB;  Service: Cardiology;  Laterality: N/A;  AFL, RFA, LORENE, MAKAYLA (cx if SR), LEANNE miller, 3 Prep    BREAST BIOPSY Left 2002    core bx, +    BREAST BIOPSY Right 2018    core    BREAST BIOPSY Right 2019    BREAST LUMPECTOMY Left 2002    CYSTOSCOPY  4/28/2022    Procedure: CYSTOSCOPY;  Surgeon: Vik Ware MD;  [facility];  Service: Urology;;    CYSTOSCOPY  5/30/2022     Procedure: CYSTOSCOPY;  Surgeon: Bonnie Knutson MD;  Location: [facility] OR [facility] FLR;  Service: Urology;;    ECHOCARDIOGRAM,TRANSESOPHAGEAL N/A 6/17/2024    Procedure: Transesophageal echo (MAKAYLA) intra-procedure log documentation;  Surgeon: Nestor Martinez MD;  Location: [facility] EP LAB;  Service: Cardiology;  Laterality: N/A;    ERCP N/A 7/30/2024    Procedure: ERCP (ENDOSCOPIC RETROGRADE CHOLANGIOPANCREATOGRAPHY);  Surgeon: Sierra Cotto MD;  Location: [facility] ENDO (2ND FLR);  Service: Endoscopy;  Laterality: N/A;    IMPLANTATION OF LEADLESS PACEMAKER N/A 4/10/2025    Procedure: INSERTION, CARDIAC PACEMAKER, LEADLESS;  Surgeon: CAROLINE Solis MD;  Location: [facility] EP LAB;  Service: Cardiology;  Laterality: N/A;  CHB, MICRA leadless PPM, MDT, Anes, EH, CICU [id number]    LASER LITHOTRIPSY  5/30/2022    Procedure: LITHOTRIPSY, USING LASER;  Surgeon: Bonnie Knutson MD;  [facility];  Service: Urology;;    LITHOTRIPSY      MASTECTOMY Left 06/2002    left-& lymph node dissection    REMOVAL, PACEMAKER, TEMPORARY TRANSVENOUS  4/10/2025    Procedure: Removal, Pacemaker, Temporary Transvenous;  Surgeon: CAROLINE Solis MD;  Location: [facility] EP LAB;  Service: Cardiology;;    REPLACEMENT OF STENT Right 5/30/2022    Procedure: REPLACEMENT, STENT;  Surgeon: Bonnie Knutson MD;  [facility];  Service: Urology;  Laterality: Right;    RETROGRADE PYELOGRAPHY Right 4/28/2022    Procedure: PYELOGRAM, RETROGRADE;  Surgeon: Vik Ware MD;  Location: [facility] OR 1ST FLR;  Service: Urology;  Laterality: Right;    ROBOT-ASSISTED LAPAROSCOPIC PARTIAL NEPHRECTOMY USING DA JESÚS XI Right 3/11/2021    Procedure: XI ROBOTIC NEPHRECTOMY, PARTIAL;  Surgeon: Taz Ortega MD;  Location: [facility] OR 2ND FLR;  Service: Urology;  Laterality: Right;  4hr/ gen with [facility] confirmation:  [id number] for 11:15am case NC    URETEROSCOPIC REMOVAL OF URETERIC CALCULUS Right 5/30/2022    Procedure:  REMOVAL, CALCULUS, URETER, URETEROSCOPIC;  Surgeon: Bonnie Knutson MD;  Location: [facility] OR [address] [place] [address];  Service: Urology;  Laterality: Right;    ureteroscopy Bilateral     6.14    URETEROSCOPY Right 5/30/2022    Procedure: URETEROSCOPY;  Surgeon: Bonnie Knutson MD;  Location: [facility] OR [address] [place] [address];  Service: Urology;  Laterality: Right;       Review of patient's allergies indicates:   Allergen Reactions    Adhesive tape-silicones     Adhesive Rash     Pt states she removed a LATEX bandaid and the skin beneath was swollen and red. No other latex causes a reaction.       Medications:  Continuous Infusions:  Scheduled Meds:   apixaban  5 mg Oral BID    levothyroxine  50 mcg Oral Before breakfast    LIDOcaine  1 patch Transdermal Q24H    metoprolol tartrate  12.5 mg Oral BID    scopolamine  1 patch Transdermal Q3 Days     PRN Meds:  Current Facility-Administered Medications:     acetaminophen, 650 mg, Oral, Q6H PRN    albuterol-ipratropium, 3 mL, Nebulization, Q6H PRN    dextrose 50%, 12.5 g, Intravenous, PRN    dextrose 50%, 25 g, Intravenous, PRN    glucagon (human recombinant), 1 mg, Intramuscular, PRN    glucose, 16 g, Oral, PRN    glucose, 24 g, Oral, PRN    hydrALAZINE, 10 mg, Oral, Q8H PRN    hydrOXYzine HCL, 25 mg, Oral, TID PRN    nitroGLYCERIN, 0.4 mg, Sublingual, Q5 Min PRN    ondansetron, 8 mg, Oral, Q8H PRN    polyethylene glycol, 17 g, Oral, BID PRN    senna, 8.6 mg, Oral, Daily PRN    simethicone, 1 tablet, Oral, TID PRN    sodium chloride, 1 spray, Each Nostril, PRN    sodium chloride 0.9%, 10 mL, Intravenous, PRN    Family History       Problem Relation (Age of Onset)    Arthritis Mother, Sister    Bone cancer Mother    Breast cancer Mother    Cancer Father    Crohn's disease Daughter    Fibroids Daughter    No Known Problems Brother, Daughter          Tobacco Use    Smoking status: Former     Current packs/day: 0.00     Average packs/day: 1 pack/day for 50.0 years (50.0 ttl pk-yrs)     Types:  Cigarettes     Start date: 1960     Quit date: 5/20/2009     Years since quitting: 15.9    Smokeless tobacco: Former   Substance and Sexual Activity    Alcohol use: No    Drug use: No    Sexual activity: Not Currently     Partners: Male     Birth control/protection: Partner-Vasectomy       Review of Systems   Constitutional:  Positive for activity change and fatigue.   HENT:  Positive for voice change.    Respiratory:  Positive for shortness of breath.    Cardiovascular:  Positive for leg swelling.   Gastrointestinal:  Positive for nausea.   Neurological:  Positive for weakness.   Hematological:  Bruises/bleeds easily.     Objective:     Vital Signs (Most Recent):  Temp: 97.5 °F (36.4 °C) (04/28/25 1109)  Pulse: 77 (04/28/25 1109)  Resp: 19 (04/28/25 1109)  BP: 128/60 (04/28/25 1109)  SpO2: (!) 92 % (04/28/25 1109) Vital Signs (24h Range):  Temp:  [94.6 °F (34.8 °C)-97.5 °F (36.4 °C)] 97.5 °F (36.4 °C)  Pulse:  [72-84] 77  Resp:  [14-19] 19  SpO2:  [92 %-95 %] 92 %  BP: (128-175)/(60-80) 128/60     Weight: 71.5 kg (157 lb 10.1 oz)  Body mass index is 27.06 kg/m².       Physical Exam  Vitals and nursing note reviewed.   Constitutional:       General: She is not in acute distress.     Appearance: She is ill-appearing.   HENT:      Head: Normocephalic and atraumatic.      Nose: Nose normal.      Mouth/Throat:      Mouth: Mucous membranes are dry.   Eyes:      Extraocular Movements: Extraocular movements intact.   Cardiovascular:      Rate and Rhythm: Normal rate.   Pulmonary:      Effort: Pulmonary effort is normal.   Skin:     General: Skin is warm and dry.   Neurological:      Mental Status: She is alert and oriented to person, place, and time.      Motor: Weakness present.   Psychiatric:         Mood and Affect: Mood normal.         Behavior: Behavior normal.         Thought Content: Thought content normal.         Judgment: Judgment normal.            Review of Symptoms      Symptom Assessment (ESAS 0-10  Scale)  Pain:  0  Dyspnea:  0  Anxiety:  0  Nausea:  0  Depression:  0  Anorexia:  0  Fatigue:  0  Insomnia:  0  Restlessness:  0  Agitation:  0     CAM / Delirium:  Negative  Constipation:  Negative  Diarrhea:  Negative      Modified Berto Scale:  1    Performance Status:  50    Psychosocial/Cultural:   See Palliative Psychosocial Note: Yes  - Previously , now .  - Prior to patient's hospitalizations, she was living alone. Daughter lived in neighborhood and assisted patient with cleaning and cooking  - Over the last year, patient/daughters report patient has spent more time in the hospital/rehab than at home.   - Patient states he enjoys sitting on her porch, gardening in her garden pots, and spending time with her tabby cat (male, Romeo)   - Patient's primary support system includes her 2 daughters, Sirisha and Esther.  - previously enjoyed driving, shopping at [employer], [employer], and [employer], but states it has been a while since she has been able to do all the things she enjoys  **Primary  to Follow**  Palliative Care  Consult: Yes    Spiritual:  F - Megan and Belief:  Adventism        Advance Care Planning   Advance Directives:   Living Will: Yes        Copy on chart: Yes    LaPOST: No    Do Not Resuscitate Status: Yes    Medical Power of : Yes    Agent's Name:  Esther Barajas   Agent's Contact Number:  [phone]    Decision Making:  Patient answered questions and Family answered questions  Goals of Care: The patient endorses that what is most important right now is to focus on spending time at home, remaining as independent as possible, quality of life, even if it means sacrificing a little time, and continuing infectious workup    Accordingly, we have decided that the best plan to meet the patient's goals includes continuing with treatment and allowing for additional time for goals of care discussions           Significant Labs: All pertinent labs  within the past 24 hours have been reviewed.  CBC:   Recent Labs   Lab 04/28/25  0457   WBC 6.31   HGB 11.2*   HCT 35.8*   MCV 91        BMP:  Recent Labs   Lab 04/28/25  0457   *   K 3.9   *   CO2 28   BUN 39*   CREATININE 0.9   CALCIUM 10.4   MG 2.1     LFT:  Lab Results   Component Value Date    AST 51 (H) 04/28/2025    ALKPHOS 102 04/28/2025    BILITOT 0.5 04/28/2025     Albumin:   Albumin   Date Value Ref Range Status   04/28/2025 2.1 (L) 3.5 - 5.2 g/dL Final   02/18/2025 2.8 (L) 3.5 - 5.2 g/dL Final     Protein:   Total Protein   Date Value Ref Range Status   02/18/2025 6.0 6.0 - 8.4 g/dL Final     Lactic acid:   Lab Results   Component Value Date    LACTATE 1.6 04/21/2025    LACTATE 0.8 04/20/2025       Significant Imaging: I have reviewed all pertinent imaging results/findings within the past 24 hours.      WBC Whole Body Scan 4/25/25  Impression:     The mild diffuse increased uptake around the left knee joint likely related to infectious/ inflammatory arthritis.  No other foci of abnormal increased uptake to suggest other source of infection

## 2025-04-28 NOTE — ASSESSMENT & PLAN NOTE
Impression:  Misa Barajas is a 79 y.o. female with PMHx of COPD on 2L home O2, HFpEF, AFL s/p RFA (6/17/24 w/ Dr. Church), CKD III, HTN, HLD, chronic venous insufficiency, orthostatic hypotension who was brought via EMS after per history she was found confused and bradycardiac with HR in the 20s. Patient was percutaneously paced. During hospitalization, was intubated then extubated with residual hoarseness of voice (ENT following), required intermittent pacing by TVP, a Micra PM by EP placed 04/10, aggressive diuresing, recurrent sepsis and hypothermia, patient has required several trials of antibiotics, anticipating WBC scan on 4/25 (ID following), and presumed PE (unable to verify with CTA secondary to MARQUEZ).      Of note, patient has had 7x ED visits in the last year. Patient has engaged in goals of care discussions with Dr. Velasquez of Palliative Medicine for goals of care and symptom management. Palliative Medicine was consulted by primary, Dr. Antunez, for goals of care and advanced care planning discussions.    Patient is currently resting in bed, AAOx4, daughters (Sirisha and Esther) at bedside. Patient and family are amenable to Pal Med. Pal Med APRN and LCSW at bedside for goals of care discussions.     - Prior experience with serious illness: yes  -The patient has previously engaged in advance care planning or GOC discussions  - Insight/Understanding of illness: yes      Advance Care Planning     Date: 04/28/2025  - Patient resting in bed, 4L O2, jeffrey hugger warming blanket in place, no acute distress upon my visit. Patient's daughters arrive at bedside shortly after.   - Daughters requested we meet outside of room so that we do not disturb patient's rest. I met with patient's daughter in 3rd floor Visitor's Garden.  - We discussed, patient's recent hospital encounters, current hospitalization, and patient's progressive health decline.   - Daughters acknowledge patient's journey in/out of hospital for past  "year has been tiring and exhausting and are interested in exploring hospice options. Sirisha states she has been trying to juggle working and caring for her mom after her work day, but is limited in the care that she gives her mom because she has no past medical background. Esther states she has been fortunate to be able to work from home to be with her mom more often. Both daughters acknowledged the emotional strain caring for their mom has caused. Daughters state when something "goes wrong", they try to do as much as they can (limited to checking a blood pressure, O2 sats, and giving a dose of zofran). Once they have exhausted those interventions, they call 911 because they know they are "in over their heads" to get additional help. Patient is then admitted to hospital for additional workup. Sirisha states that it has been so traumatizing when she sees her mom is sick, tries to do everything she can to care for her mom, notices it is not working, and hears her mom begging for help, but refuses to go to the hospital. This has created immense stress on the daughters. Patient never wants to go to the hospital, but eventually agrees to come to ED because either daughters convince her or because patient becomes too sick to independently make a decision. Sirisha states she is worried that she is "giving up" on her mom even though patient has been "fighting" for so long. Philosophies of hospice introduced and discussed. Esther states she has been caring for and thinking about how her mom has progressively declined over the last year and expresses desires to prioritize patient's comfort knowing that with comfort being the priority, life will be limited. Daughters acknowledged patient wants to go home, but state they do not have the resources to continue to care for the patient at home. Daughters inquired about inpatient hospice unit, discussed in-patient hospice criteria. During conversation, daughters became appropriately " "tearful. Emotional support provided. Daughters asked for additional time to process steps forward and where the patient would continued to be cared for.   - After speaking with the daughters, I rounded on the patient again. Patient was awake and amenable to Pal Med. Patient acknowledges her health decline and states it has been taxing and tiring as she has had multiple hospitalization, including the current encounter. Patient agrees that her comfort has not been prioritized over the last year and if open to talking more about hospice care. Philosophies of hospice were introduced and discussed. Patient is accepting, and states she would like to go home. I informed patient that daughters expressed concerns being able to care for patient and would like to talk more about facility placement/in-patient hospice unit. Patient expressed that she was overwhelmed with the conversation and describes it as "a lot." Patient acknowledges she was not expecting to be having this [hospice] conversation so soon, but understands that we need to have it because she has not had much quality of life lately. Patient states she would like additional time to talk with her daughters.   - Tentative family meeting 4/29/30 at 0900. Primary team, Dr. Antunez, updated via secure chat.    Date: 04/25/2025  - No acute events overnight. Patient anticipates NM Inflammatory Whole Body scan today.  - Revisited goals of care discussion from yesterday. Patient acknowledged it was a difficult conversation to have, but has been open to it. As we anticipate more information from the pending studies, goals of care discussions are also pending. Patient expressed concerns regarding nuclear scan and when it will be getting done. Patient states she thought it would have been done by now. I followed up with radiology to inquire about scan time, and updated patient on the 1-3pm time frame that radiology hopes to see patient. Patient's daughter Esther was at bedside " and updated.   - Patient with no needs at this time.  - Primary team updated.        Date: 04/24/2025    Code Status  In light of the patients advanced and life limiting illness, I engaged the patient and family in a voluntary conversation about the patient's preferences for care  at the very end of life. The patient wishes to have a natural, peaceful death.  Along those lines, the patient does not wish to have CPR or other invasive treatments performed when her heart and/or breathing stops. I communicated to the patient and family that a DNR order would be placed in her medical record to reflect this preference.        [facility]  I engaged the patient and family in a voluntary conversation about advance care planning and we specifically addressed what the goals of care would be moving forward, in light of the patient's change in clinical status, specifically recurrent hospitalizations over past year, hypothermia, acute renal failure, CHF exacerbation, and current hosptialization.  We did specifically address the patient's likely prognosis, which  appears to be poor .  We explored the patient's values and preferences for future care.  The patient and family endorses that what is most important right now is to focus on spending time at home, avoiding the hospital, quality of life, even if it means sacrificing a little time, and allowing additional time for infectious workup    Accordingly, we have decided that the best plan to meet the patient's goals includes continuing with treatment and continuing goals of care discussions pending infectious workup               Life Limiting Diagnosis  Acute on Chronic CHF  Acute Renal Failure on CKD stage 3b  Complete Heart Block            Symptom Management  - Pain: no  - acetaminophen 650mg q6h PRN    - Dyspnea: no  - on 2L at home  - maintained on 4L O2  - 25h SpO2: 90-99%    - Constipation: no  - senna-docusate daily PRN  - poluethylene glycol daily PRN    - Nausea: no  -  ondansetron 8mg q8h    - Debility: yes  - Functional status: fair.  Pt was not able to manage ADLs  - Fall precautions  - PT/OT recommending moderate intensity therapy          Recommendations  - Please see above  - Continue management per primary team  - Patient and family would benefit from continued education and information regarding plan of care, prognosis, and what to expect in the future  - Most important goals at this time: completing infectious workup and obtaining more information regarding plan of care   - Code status: DNR  - Disposition: TBD        The above recommendations communicated directly to primary team on 4/28/25  Thank you for consulting Palliative Medicine.

## 2025-04-28 NOTE — PT/OT/SLP PROGRESS
Occupational Therapy      Patient Name:  Misa Barajas   MRN:  [id number]    Patient not seen today secondary to feeling fatigued. Pt wants therapy to continue to visit. Will follow-up per POC.    4/28/2025

## 2025-04-28 NOTE — PLAN OF CARE
MARINE informed by Krystyna with Palliative Care that she met with pt and daughters Esther and Marina this morning and discussed possible transition to hospice.  Per Krystyna plan is to give pt's daughters space overnight to process their conversation then Krystyna will meet with them again tomorrow 4/29.  MARINE will follow up.      Karey Castelan LMSW  [facility]  [id number]

## 2025-04-28 NOTE — NURSING
Tele called, pt had 6 beats of vtach, pt VSS, resting comfortable at this time. Denies pain. Labs reviewed from am, WNL . Notified Hos med C on call NP.      Latest Reference Range & Units 04/27/25 05:40   Potassium 3.5 - 5.1 mmol/L 4.0      Latest Reference Range & Units 04/27/25 05:40   Magnesium  1.6 - 2.6 mg/dL 2.2

## 2025-04-28 NOTE — ASSESSMENT & PLAN NOTE
Anemia is likely due to chronic disease due to Chronic Kidney Disease. Most recent hemoglobin and hematocrit are listed below.  Recent Labs     04/26/25  0439 04/27/25  0541 04/28/25  0457   HGB 12.3 12.0 11.2*   HCT 39.6 38.7 35.8*     Plan  - Monitor serial CBC: Daily  - Transfuse PRBC if patient becomes hemodynamically unstable, symptomatic or H/H drops below 7/21.  - Patient has not received any PRBC transfusions to date  - Patient's anemia is currently stable

## 2025-04-28 NOTE — PROGRESS NOTES
[facility] - Cardiology [facility] Medicine  Progress Note    Patient Name: Misa Barajas  MRN: [id number]  Patient Class: IP- Inpatient   Admission Date: 4/6/2025  Length of Stay: 22 days  Attending Physician: Uche Antunez MD  Primary Care Provider: Isela Langston MD        Subjective     Principal Problem:Palliative care encounter        HPI:  Ms. Misa Barajas is a 80 yo female with COPD on 2L home O2, HFpEF, AFL s/p RFA (6/17/24 w/ Dr. Church), CKD III, HTN, HLD, chronic venous insufficiency, orthostatic hypotension who was brought via EMS after per history she was found confused and bradycardiac with HR in the 20s.They started epi and transcutaneous pacing. Given Versed for the transcutaneous pacing. They had to bag her in route. We do not have strip of underlying rhythm. Patient obtunded for which she was intubated for airway protection on arrival to the ED and transcutaneous pacing was exchanged to transvenous pacing via RIJ (threshold 0.6). Underlying rate 44 bifascicular block. Known RBBB. Per daughters patient had called them over the phone when they noticed she appeared confused. Due to concerns for a possible syncopal episode EMS was called. Prior to event patient had been complaining of malaise and nausea for which she took about two Zofran pills per the daughters.      She had been recently seen on 3/27/25 at the [facility] visit following recent admission for AHRF 2/2 ADHF and COPD exacerbation. On ED presentation BNP elevated to 2500, troponin elevated to 19, CXR with pulmonary edema. She was started on steroids, nebs and antibiotics in addition to IV diuresis. Updated TTE stable from prior, showing HFpEF with elevated CVP 15. Her O2 requirements improved to baseline with diuresis and she was subsequently discharged. At that visit patient had not required midodrine as blood pressure had remained stable and she was asymptomatic.      Off note, patient was taken off OAC due to bleeding issues  "and no recurrent of typical atrial flutter after ablation.     Overview/Hospital Course:  Admitted to CCU for CHB in s/o hyperkalemia & in the presence of conduction disease/RBBB & LAHB - stable w/ temporary wire. ??Septic shock deemed to be d/t UTI - ?? UA with only 7 WBCs); vasopressors weaned off. HTN; started NG gtt. Acute on chronic hypoxic/hypercapnic RF & obtunded; intubated. Successfully extubated 04/08. MARQUEZ on CKD; improved w/ good UOP response to diuresis. PVCs w/ intermittent pacing by TVP. Switched pip-tazo to amox-clav on 04/09; continued to improve; empiric EED 04/13 for 7 day course. Micra PM by EP placed 04/10. Medically stable for stepdown. Patient was extremely short of breath with physical therapy, chest x-ray concerning for pulmonary edema.  Started patient on IV diuresis, now euvolemic.  ENT was consulted yesterday due to hoarseness of voice.  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 Pt uninterested in any procedure at this time, ENT recommended SLP follow up.  Able to tolerate regular diet at this point.  Now agreeable for SNF, working on placement. Course c/b worsening hypoxia & hyperNa.      Overnight 4/18-4/19, decompensated with recurrent sepsis, hypothermia 92, possible PNA with infiltrates on CXR, Zosyn IV started. Remained hypothermic despite Zosyn x 2 doses and Manny hugger in place (pt uncomfortable/hot and asking for removal), so added Vanc IV STAT. Checked TSH and FT4-  wnl. UA with pyuria however not a clean catch w 33 sq epi, so repeated UA. C/o "stomach" pain, overall picture with the "stomach" pain and septic picture is "exactly what led us to ICU 2 weeks ago" per daughter. +TTP RUQ, RLQ, LLQ.   Poor UOP, only 200cc by mid-shift, and still hyperNa with FWD 1.8L so started gentle hydration with D5W @ 75cc for 24 hrs. May need to diurese again after sepsis resolves. CT C/A/P noncon with pulm edema, B pleural effusions, possible PNA, no " intra-abdominal process seen.     On 4/21, Mental status much improved, however still with mild confusion and feels very tired.   decompensated further with recurrent MARQUEZ, C/o new SOB and hypoxemic 90% on 4L (over her baseline of 2L at home), improved to >94% on 6L with improvement of SOB.  C/o new L chest pressure/pain 4/10 (non-radiating), c/f MI or PE, resolved after nitro SL x 1 however HypoT with SBP upper 80s so will avoid further nitro.  HS trop 38 (decreased from prior), BNP 700s (increased from prior).CXR with pulm edema, all most c/w ADHF- Lasix 40 IV x 1 given.    Concern for untreated sepsis with ongoing hypothermia requiring jeffrey hugger despite Vanc/Zosyn for over 24 hours, so broadened to Vanc/Sher/Dinah, consulted ID, repeated BCx x 2. Lactate wnl.  Re: concern for PE, Known LIJ DVT, apixaban x several days then stopped (likely for hemorrhage of vocal fold)- discussed w ENT 4/20, ok to resume anticoag if needed. PE workup- F/u STAT UE and LE Dopplers. Unable to check CTA chest given MARQUEZ, unable to check V/Q given pulm edema. Anticoagulation with Apixaban.  Hypothermia returned however pt looks and feels the best she has in 4 days.  Voice is getting stronger.  Sitting in chair.  UCx resulted with ESBL Klebsiella, so Sher has been good coverage.  ID changed to Dapto (from Vanc), continuing Sher, for coverage of UTI and well as PNA.  BCx have all been negative.  Continuing Eliquis for DVt LUE and presumed PE.  Acute hypox resp failure 2/2 ADHF and presumed PE, still on 6L, dosing Lasix IV PRN daily given yet-unclear status of her sepsis and avoiding volume depletion in sepsis.      Prior to admit, was independent in her residence with her cat.  Two daughters very supportive.        Interval History: Hypothermia of 94.6 this am. Sodium of 151.  Plan for hospice/palliative care at this time.  Palliative Care to reach out to daughters    Review of Systems   Unable to perform ROS: Other     Objective:      Vital Signs (Most Recent):  Temp: 97.5 °F (36.4 °C) (ambient setting) (04/28/25 0644)  Pulse: 74 (04/28/25 1020)  Resp: 18 (04/28/25 0700)  BP: (!) 172/75 (04/28/25 0816)  SpO2: (!) 94 % (04/28/25 0700) Vital Signs (24h Range):  Temp:  [94.6 °F (34.8 °C)-97.5 °F (36.4 °C)] 97.5 °F (36.4 °C)  Pulse:  [72-84] 74  Resp:  [14-18] 18  SpO2:  [93 %-95 %] 94 %  BP: (132-175)/(62-80) 172/75     Weight: 71.5 kg (157 lb 10.1 oz)  Body mass index is 27.06 kg/m².    Intake/Output Summary (Last 24 hours) at 4/28/2025 1042  Last data filed at 4/28/2025 0906  Gross per 24 hour   Intake 30 ml   Output 575 ml   Net -545 ml                Physical Exam  Constitutional:       Appearance: She is ill-appearing.   HENT:      Head: Normocephalic.      Right Ear: External ear normal.      Left Ear: External ear normal.      Nose: Nose normal.      Comments: Nasal cannula  Cardiovascular:      Rate and Rhythm: Normal rate.      Heart sounds: Normal heart sounds.   Pulmonary:      Breath sounds: Normal breath sounds.   Abdominal:      Palpations: Abdomen is soft.      Tenderness: There is no abdominal tenderness.   Musculoskeletal:      Right lower leg: No edema.      Left lower leg: No edema.   Skin:     Findings: Bruising present.   Neurological:      Mental Status: She is alert. Mental status is at baseline.              Significant Labs: All pertinent labs within the past 24 hours have been reviewed.        Assessment & Plan  Complete heart block  RBBB  Shock  -Known RBBB. Admitted with CHB and shock.  Bradycardic with HR in the 20s on arrival, transcutaneous pacing/epi initiated, admitted to CCU  - bradycardia most likely in the setting of electrolyte disturbance and abnormal thyroid function  Plan:  -s/p micra PM placement 4/10 by EP  -correct electrolyte derangements  Sepsis  Hypothermia  Cont Sher/Dapto for now  - f/u NM tagged WBC scan whole body   - f/u BCx and UCx  - f/u ID recs    4/24  Follow up nuclear studies.  Palliative  Care on board.  Follow up with Infectious Disease  Cont Sher/Dapto for now  - f/u NM tagged WBC scan whole body   - f/u BCx and UCx  - f/u ID recs    4/26  Nuclear studies report and ID input noted.  To complete antibiotics tomorrow  Cont Sher/Dapto for now  - f/u NM tagged WBC scan whole body   - f/u BCx and UCx  - f/u ID recs    4/24  Follow up nuclear studies.  Palliative Care on board.  Follow up with Infectious Disease  Cont Sher/Dapto for now  - f/u NM tagged WBC scan whole body   - f/u BCx and UCx  - f/u ID recs  Acute pulmonary embolism  Left chest pressure  Acute deep vein thrombosis (DVT) of left upper extremity  PRESUMED PE (unable to do imaging at this time as risk > benefit)  CONFIRMED LUE DVT  - Apixaban 10 BID x 7 days then 5 BID  - TTE with mild R heart strain  Hypernatremia  Very slowly uptrending Na since admission. Peak 150 on 4/17 for which pt received 250cc LR. Na 149 the following day & pt w/ increased O2 needs & LE edema. Unclear etiology as pt does not appear hypovolemic.   - encourage water PO  - Consider Nephrology consult  - Monitor BMP Q12-24H     4/24  Resolved  Dysphonia  Paralysis of right vocal cord  -Persistent hoarseness since extubation on 4/8  -ENT consulted, status post laryngoscopy 4/15 which noted right TDC paralysis with mild hemorrhage of true cord, adequate glottic closure noted  -Pt uninterested in VC augmentation at this time, needs outpatient SLP follow up  Typical atrial flutter  -s/p ablation 6/2024 without recurrence  -AC discontinued by outpatient EP provider  -Cont BB  - of note, on Eliquis for VTE (not for Afib)  CKD (chronic kidney disease) stage 3, GFR 30-59 ml/min  Creatine stable for now. BMP reviewed- noted Estimated Creatinine Clearance: 47.5 mL/min (based on SCr of 0.9 mg/dL). according to latest data. Based on current GFR, CKD stage is stage 4 - GFR 15-29.  Monitor UOP and serial BMP and adjust therapy as needed. Renally dose meds. Avoid nephrotoxic  "medications and procedures.    Centrilobular emphysema  Prior history of smoking. Continue scheduled inhalers once extubated. Antibiotics and Supplemental oxygen and monitor respiratory status closely.     Chronic respiratory failure with hypoxia  Patient with Hypoxic Respiratory failure which is Chronic.  she is on home oxygen at 2 LPM. Supplemental oxygen was provided and noted-       Signs/symptoms of respiratory failure include- tachypnea, increased work of breathing, use of accessory muscles, and lethargy. Contributing diagnoses includes - underlying COPD   -transition to home bumex    Acute on chronic congestive heart failure  Most recent BNP and echo results are listed below.  No results for input(s): "BNP" in the last 72 hours.     Echo  Result Date: 4/21/2025    Left Ventricle: The left ventricle is normal in size. Normal wall   thickness. There is normal systolic function with a visually estimated   ejection fraction of 55 - 60%.    Right Ventricle: The right ventricle has mild enlargement. Systolic   function is mildly reduced.    Aortic Valve: There is moderate aortic valve sclerosis. There is mild   annular calcification present. Mildly restricted motion.    Mitral Valve: There is severe mitral annular calcification.    Tricuspid Valve: There is mild regurgitation.    Pulmonary Artery: There is moderate pulmonary hypertension. The   estimated pulmonary artery systolic pressure is 51 mmHg.    IVC/SVC: Elevated venous pressure at 15 mmHg.    Left pleural effusion.      Current Heart Failure Medications: have been held due to bradycardia and MARQUEZ     Plan  - Lasix 80 IV x 1, re-dose daily after clinical eval, slow diuresis while still actively in sepsis  - HOLD home hydralazine for same reason  - cont home metop for rate control in afib  - Monitor strict I&Os and daily weights.  - Monitor electrolytes, replete PRN for goal K > 4 & Mg > 2  - Telemetry    Orthostatic hypotension  holding midodrine and per " "history patient had not required it since last hospitalization     Iron deficiency anemia due to chronic blood loss  Anemia is likely due to chronic disease due to Chronic Kidney Disease. Most recent hemoglobin and hematocrit are listed below.  Recent Labs     04/26/25  0439 04/27/25  0541 04/28/25  0457   HGB 12.3 12.0 11.2*   HCT 39.6 38.7 35.8*     Plan  - Monitor serial CBC: Daily  - Transfuse PRBC if patient becomes hemodynamically unstable, symptomatic or H/H drops below 7/21.  - Patient has not received any PRBC transfusions to date  - Patient's anemia is currently stable    Subclinical hypothyroidism  Per Endocrine consult, "Mild, subclinical hypothyroidism - unlikely to be the cause of shock or mental status changes especially given normal fT4. Can start low dose LT4 per OGT, but in cardiac pts and elderly, better to under treat rather than over-treat -- therefore conservative/low dose LT4 recommended. Needs TSH repeated outpatient in 4wks."    Left knee pain  Appreciate Ortho consult 4/21  Cont Lido patch    Cystitis      Pleural effusion      Anticoagulated      Pulmonary hypertension      Other reduced mobility      Severe protein-calorie malnutrition  Interventions/Recommendations (treatment strategy):  1. Continue low Na diet as tolerated    - please continue to document PO % intake via flowsheets   2. Continue boost plus TID 3. Encourage good intake  4. RD to monitor weight, labs, intake, tolerance    Palliative care encounter  Complex medical history with overall poor prognosis.  Significant debility.  Notable pleural effusions ?  Contributing to some degree of shortness of breath and chest discomfort.  Pan CT done recently no reports of intra-abdominal pathology to account for abdominal pain.  Intermittent hypernatremia ?  Poor oral intake.  Difficulties with fluid status management.  Recommending palliative care at this time, however family to decide whether or not they want to do skilled nursing " "facility or pursue palliative care/hospice.    4/28  Decision made for hospice/palliative care. F/u with them    VTE Risk Mitigation (From admission, onward)           Ordered     apixaban tablet 5 mg  2 times daily        Placed in "Followed by" Linked Group    04/20/25 1900     IP VTE HIGH RISK PATIENT  Once         04/06/25 0512     Place sequential compression device  Until discontinued         04/06/25 0454                    Discharge Planning   NOHEMI: 4/29/2025     Code Status: DNR   Medical Readiness for Discharge Date:   Discharge Plan A: Skilled Nursing Facility   Discharge Delays: (!) Payor Issues            Please place Justification for DME        Uche Antunez MD  Department of Hospital Medicine   [facility] - Cardiology Stepdown    "

## 2025-04-28 NOTE — PROGRESS NOTES
[facility] - Cardiology Stepdown  Palliative Medicine  Progress Note    Patient Name: Misa Barajas  MRN: [id number]  Admission Date: 4/6/2025  Hospital Length of Stay: 22 days  Code Status: DNR   Attending Provider: Uche Antunez MD  Consulting Provider: Krystyna Hdz NP  Primary Care Physician: Isela Langston MD  Principal Problem:Palliative care encounter    Patient information was obtained from patient, relative(s), past medical records, and primary team.      Assessment/Plan:     Palliative Care  * Palliative care encounter  Impression:  Misa Barajas is a 79 y.o. female with PMHx of COPD on 2L home O2, HFpEF, AFL s/p RFA (6/17/24 w/ Dr. Church), CKD III, HTN, HLD, chronic venous insufficiency, orthostatic hypotension who was brought via EMS after per history she was found confused and bradycardiac with HR in the 20s. Patient was percutaneously paced. During hospitalization, was intubated then extubated with residual hoarseness of voice (ENT following), required intermittent pacing by TVP, a Micra PM by EP placed 04/10, aggressive diuresing, recurrent sepsis and hypothermia, patient has required several trials of antibiotics, anticipating WBC scan on 4/25 (ID following), and presumed PE (unable to verify with CTA secondary to MARQUEZ).      Of note, patient has had 7x ED visits in the last year. Patient has engaged in goals of care discussions with Dr. Velasquez of Palliative Medicine for goals of care and symptom management. Palliative Medicine was consulted by primary, Dr. Antunez, for goals of care and advanced care planning discussions.    Patient is currently resting in bed, AAOx4, daughters (Sirisha and Esther) at bedside. Patient and family are amenable to Pal Med. Pal Med APRN and LCSW at bedside for goals of care discussions.     - Prior experience with serious illness: yes  -The patient has previously engaged in advance care planning or GOC discussions  - Insight/Understanding of illness: yes      Advance Care  "Planning    Date: 04/28/2025  - Patient resting in bed, 4L O2, jeffrey hugger warming blanket in place, no acute distress upon my visit. Patient's daughters arrive at bedside shortly after.   - Daughters requested we meet outside of room so that we do not disturb patient's rest. I met with patient's daughter in 3rd floor Visitor's Garden.  - We discussed, patient's recent hospital encounters, current hospitalization, and patient's progressive health decline.   - Daughters acknowledge patient's journey in/out of hospital for past year has been tiring and exhausting and are interested in exploring hospice options. Sirisha states she has been trying to juggle working and caring for her mom after her work day, but is limited in the care that she gives her mom because she has no past medical background. Esther states she has been fortunate to be able to work from home to be with her mom more often. Both daughters acknowledged the emotional strain caring for their mom has caused. Daughters state when something "goes wrong", they try to do as much as they can (limited to checking a blood pressure, O2 sats, and giving a dose of zofran). Once they have exhausted those interventions, they call 911 because they know they are "in over their heads" to get additional help. Patient is then admitted to hospital for additional workup. Sirisha states that it has been so traumatizing when she sees her mom is sick, tries to do everything she can to care for her mom, notices it is not working, and hears her mom begging for help, but refuses to go to the hospital. This has created immense stress on the daughters. Patient never wants to go to the hospital, but eventually agrees to come to ED because either daughters convince her or because patient becomes too sick to independently make a decision. Sirisha states she is worried that she is "giving up" on her mom even though patient has been "fighting" for so long. Philosophies of hospice " "introduced and discussed. Esther states she has been caring for and thinking about how her mom has progressively declined over the last year and expresses desires to prioritize patient's comfort knowing that with comfort being the priority, life will be limited. Daughters acknowledged patient wants to go home, but state they do not have the resources to continue to care for the patient at home. Daughters inquired about inpatient hospice unit, discussed in-patient hospice criteria. During conversation, daughters became appropriately tearful. Emotional support provided. Daughters asked for additional time to process steps forward and where the patient would continued to be cared for.   - After speaking with the daughters, I rounded on the patient again. Patient was awake and amenable to Pal Med. Patient acknowledges her health decline and states it has been taxing and tiring as she has had multiple hospitalization, including the current encounter. Patient agrees that her comfort has not been prioritized over the last year and if open to talking more about hospice care. Philosophies of hospice were introduced and discussed. Patient is accepting, and states she would like to go home. I informed patient that daughters expressed concerns being able to care for patient and would like to talk more about facility placement/in-patient hospice unit. Patient expressed that she was overwhelmed with the conversation and describes it as "a lot." Patient acknowledges she was not expecting to be having this [hospice] conversation so soon, but understands that we need to have it because she has not had much quality of life lately. Patient states she would like additional time to talk with her daughters.   - Tentative family meeting 4/29/30 at 0900. Primary team, Dr. Antunez, updated via secure chat.    Date: 04/25/2025  - No acute events overnight. Patient anticipates NM Inflammatory Whole Body scan today.  - Revisited goals of care " discussion from yesterday. Patient acknowledged it was a difficult conversation to have, but has been open to it. As we anticipate more information from the pending studies, goals of care discussions are also pending. Patient expressed concerns regarding nuclear scan and when it will be getting done. Patient states she thought it would have been done by now. I followed up with radiology to inquire about scan time, and updated patient on the 1-3pm time frame that radiology hopes to see patient. Patient's daughter Esther was at bedside and updated.   - Patient with no needs at this time.  - Primary team updated.        Date: 04/24/2025    Code Status  In light of the patients advanced and life limiting illness, I engaged the patient and family in a voluntary conversation about the patient's preferences for care  at the very end of life. The patient wishes to have a natural, peaceful death.  Along those lines, the patient does not wish to have CPR or other invasive treatments performed when her heart and/or breathing stops. I communicated to the patient and family that a DNR order would be placed in her medical record to reflect this preference.        [facility]  I engaged the patient and family in a voluntary conversation about advance care planning and we specifically addressed what the goals of care would be moving forward, in light of the patient's change in clinical status, specifically recurrent hospitalizations over past year, hypothermia, acute renal failure, CHF exacerbation, and current hosptialization.  We did specifically address the patient's likely prognosis, which  appears to be poor .  We explored the patient's values and preferences for future care.  The patient and family endorses that what is most important right now is to focus on spending time at home, avoiding the hospital, quality of life, even if it means sacrificing a little time, and allowing additional time for infectious workup    Accordingly, we  "have decided that the best plan to meet the patient's goals includes continuing with treatment and continuing goals of care discussions pending infectious workup               Life Limiting Diagnosis  Acute on Chronic CHF  Acute Renal Failure on CKD stage 3b  Complete Heart Block            Symptom Management  - Pain: no  - acetaminophen 650mg q6h PRN    - Dyspnea: no  - on 2L at home  - maintained on 4L O2  - 25h SpO2: 90-99%    - Constipation: no  - senna-docusate daily PRN  - poluethylene glycol daily PRN    - Nausea: no  - ondansetron 8mg q8h    - Debility: yes  - Functional status: fair.  Pt was not able to manage ADLs  - Fall precautions  - PT/OT recommending moderate intensity therapy          Recommendations  - Please see above  - Continue management per primary team  - Patient and family would benefit from continued education and information regarding plan of care, prognosis, and what to expect in the future  - Most important goals at this time: completing infectious workup and obtaining more information regarding plan of care   - Code status: DNR  - Disposition: TBD        The above recommendations communicated directly to primary team on 4/28/25  Thank you for consulting Palliative Medicine.                I will follow-up with patient. Please contact us if you have any additional questions.    Subjective:     Chief Complaint:   Chief Complaint   Patient presents with    Bradycardia     Arrives via EMS hypotensive 80s/50s and bradycadic in the 20s.       HPI:   As per H&P, "Misa Barajas is a 79 y.o. female COPD on 2L home O2, HFpEF, AFL s/p RFA (6/17/24 w/ Dr. Church), CKD III, HTN, HLD, chronic venous insufficiency, orthostatic hypotension who was brought via EMS after per history she was found confused and bradycardiac with HR in the 20s.They started epi and transcutaneous pacing. Given Versed for the transcutaneous pacing. They had to bag her in route. We do not have strip of underlying rhythm. " Patient obtunded for which she was intubated for airway protection on arrival to the ED and transcutaneous pacing was exchanged to transvenous pacing via RIJ (threshold 0.6). Underlying rate 44 bifascicular block. Known RBBB. Per daughters patient had called them over the phone when they noticed she appeared confused. Due to concerns for a possible syncopal episode EMS was called. Prior to event patient had been complaining of malaise and nausea for which she took about two Zofran pills per the daughters.      She had been recently seen on 3/27/25 at the [facility] visit following recent admission for AHRF 2/2 ADHF and COPD exacerbation. On ED presentation BNP elevated to 2500, troponin elevated to 19, CXR with pulmonary edema. She was started on steroids, nebs and antibiotics in addition to IV diuresis. Updated TTE stable from prior, showing HFpEF with elevated CVP 15. Her O2 requirements improved to baseline with diuresis and she was subsequently discharged. At that visit patient had not required midodrine as blood pressure had remained stable and she was asymptomatic.      Off note, patient was taken off OAC due to bleeding issues and no recurrent of typical atrial flutter after ablation.      Overview/Hospital Course:  Admitted to CCU for CHB in s/o hyperkalemia & in the presence of conduction disease/RBBB & LAHB - stable w/ temporary wire. ??Septic shock deemed to be d/t UTI - ?? UA with only 7 WBCs); vasopressors weaned off. HTN; started NG gtt. Acute on chronic hypoxic/hypercapnic RF & obtunded; intubated. Successfully extubated 04/08. MARQUEZ on CKD; improved w/ good UOP response to diuresis. PVCs w/ intermittent pacing by TVP. Switched pip-tazo to amox-clav on 04/09; continued to improve; empiric EED 04/13 for 7 day course. Micra PM by EP placed 04/10. Medically stable for stepdown. Patient was extremely short of breath with physical therapy, chest x-ray concerning for pulmonary edema.  Started patient on IV  "diuresis, now euvolemic.  ENT was consulted yesterday due to hoarseness of voice.  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 Pt uninterested in any procedure at this time, ENT recommended SLP follow up.  Able to tolerate regular diet at this point.  Now agreeable for SNF, working on placement. Course c/b worsening hypoxia & hyperNa.       Overnight 4/18-4/19, decompensated with recurrent sepsis, hypothermia 92, possible PNA with infiltrates on CXR, Zosyn IV started. Remained hypothermic despite Zosyn x 2 doses and Jeffrey hugger in place (pt uncomfortable/hot and asking for removal), so added Vanc IV STAT. Checked TSH and FT4-  wnl. UA with pyuria however not a clean catch w 33 sq epi, so repeated UA. C/o "stomach" pain, overall picture with the "stomach" pain and septic picture is "exactly what led us to ICU 2 weeks ago" per daughter. +TTP RUQ, RLQ, LLQ.   Poor UOP, only 200cc by mid-shift, and still hyperNa with FWD 1.8L so started gentle hydration with D5W @ 75cc for 24 hrs. May need to diurese again after sepsis resolves. CT C/A/P noncon with pulm edema, B pleural effusions, possible PNA, no intra-abdominal process seen.      On 4/21, Mental status much improved, however still with mild confusion and feels very tired.   decompensated further with recurrent MARQUEZ, C/o new SOB and hypoxemic 90% on 4L (over her baseline of 2L at home), improved to >94% on 6L with improvement of SOB.  C/o new L chest pressure/pain 4/10 (non-radiating), c/f MI or PE, resolved after nitro SL x 1 however HypoT with SBP upper 80s so will avoid further nitro.  HS trop 38 (decreased from prior), BNP 700s (increased from prior).CXR with pulm edema, all most c/w ADHF- Lasix 40 IV x 1 given.    Concern for untreated sepsis with ongoing hypothermia requiring jeffrey hugger despite Vanc/Zosyn for over 24 hours, so broadened to Vanc/Sher/Dinah, consulted ID, repeated BCx x 2. Lactate wnl.  Re: concern for PE, " "Known LIJ DVT, apixaban x several days then stopped (likely for hemorrhage of vocal fold)- discussed w ENT 4/20, ok to resume anticoag if needed. PE workup- F/u STAT UE and LE Dopplers. Unable to check CTA chest given MARQUEZ, unable to check V/Q given pulm edema. Anticoagulation with Apixaban.  Hypothermia returned however pt looks and feels the best she has in 4 days.  Voice is getting stronger.  Sitting in chair.  UCx resulted with ESBL Klebsiella, so Sher has been good coverage.  ID changed to Dapto (from Vanc), continuing Sher, for coverage of UTI and well as PNA.  BCx have all been negative.  Continuing Eliquis for DVt LUE and presumed PE.  Acute hypox resp failure 2/2 ADHF and presumed PE, still on 6L, dosing Lasix IV PRN daily given yet-unclear status of her sepsis and avoiding volume depletion in sepsis.      Prior to admit, was independent in her residence with her cat.  Two daughters very supportive.         Interval History: Pt seen and examined this morning on rounds. Hypothermia ongoing this morning, required Manny hugger.  Resolved in afternoon.  If hypothermia returns overnight, will repeat BCx x 2.  Attempting sputum sample for Cx as well, not optimistic for any yield. NM Full body tagged WBC scan per ID recs- will get injection tomorrow 4/24 then scan on 4/25.      Re-dosed Lasix at higher dose 80mg IV x 1, re-eval again tomorrow.      Pall care consulted, NOT for hospice, NOT due to a downward clinical trajectory, as pt shows signs of clinical improvement except for the hypothermia -- consult is rather for continuity and continuation of ACP/GOC started as outpt prior to admission.       Care plan reviewed. Otherwise, doing well and with no further complaints at this time."        Hospital Course:  No notes on file    Interval History: Patient with hypothermia today, requiring manny hugger warming blanket. Patient reports having an restless night. Goals of care discussion continued.     Past Medical " History:   Diagnosis Date    Acute biliary pancreatitis 07/27/2024    Acute pancreatitis 07/27/2024    Acute pulmonary embolism 4/21/2025    Anemia 07/12/2024    Aortic atherosclerosis     Arthritis     knee joint pain    Asthma-COPD overlap syndrome 08/22/2022    home o2    Breast cancer 2002    left breast & lymph nodes-s/p sx with chemo    Cataracts, bilateral     Chronic diastolic heart failure 12/24/2019    Chronic kidney disease, stage 3     Class 1 obesity due to excess calories with serious comorbidity and body mass index (BMI) of 30.0 to 30.9 in adult 05/16/2024    Gram-negative bacteremia - Pasturella multocida 07/12/2024    History of chemotherapy     last treatment 12/2002 (had 8 treatments)    Hyperlipidemia 11/20/2016    Hypertension     Lymphedema of both lower extremities 01/25/2022    Nephrolithiasis 06/02/2014    Osteoporosis     Paroxysmal atrial fibrillation 06/07/2021    Renal osteodystrophy 01/14/2016    Sepsis 4/19/2025    Severe protein-calorie malnutrition 4/23/2025    Shock 04/06/2025    Subclinical hypothyroidism 04/06/2025    Venous stasis dermatitis of both lower extremities 01/25/2022    Vitamin D insufficiency        Past Surgical History:   Procedure Laterality Date    ABLATION, ATRIAL FLUTTER, TYPICAL N/A 6/17/2024    Procedure: Ablation, Atrial Flutter, Typical;  Surgeon: Taz Church MD;  Location: [facility] EP LAB;  Service: Cardiology;  Laterality: N/A;  AFL, RFA, LORENE, MAKAYLA (cx if SR), anes, MB, 3 Prep    BREAST BIOPSY Left 2002    core bx, +    BREAST BIOPSY Right 2018    core    BREAST BIOPSY Right 2019    BREAST LUMPECTOMY Left 2002    CYSTOSCOPY  4/28/2022    Procedure: CYSTOSCOPY;  Surgeon: Vik Ware MD;  Location: [facility] OR [address] [place] [address];  Service: Urology;;    CYSTOSCOPY  5/30/2022    Procedure: CYSTOSCOPY;  Surgeon: Bonnie Knutson MD;  Location: [facility] OR [address] [place] [address];  Service: Urology;;    ECHOCARDIOGRAM,TRANSESOPHAGEAL N/A 6/17/2024    Procedure: Transesophageal  echo (MAKAYLA) intra-procedure log documentation;  Surgeon: Nestor Martinez MD;  Location: [facility] EP LAB;  Service: Cardiology;  Laterality: N/A;    ERCP N/A 7/30/2024    Procedure: ERCP (ENDOSCOPIC RETROGRADE CHOLANGIOPANCREATOGRAPHY);  Surgeon: Sierra Cotto MD;  Location: [facility] ENDO (2ND FLR);  Service: Endoscopy;  Laterality: N/A;    IMPLANTATION OF LEADLESS PACEMAKER N/A 4/10/2025    Procedure: INSERTION, CARDIAC PACEMAKER, LEADLESS;  Surgeon: CAROLINE Solis MD;  Location: [facility] EP LAB;  Service: Cardiology;  Laterality: N/A;  CHB, MICRA leadless PPM, MDT, Anes, EH, CICU [id number]    LASER LITHOTRIPSY  5/30/2022    Procedure: LITHOTRIPSY, USING LASER;  Surgeon: Bonnie Knutson MD;  Location: [facility] OR 1ST FLR;  Service: Urology;;    LITHOTRIPSY      MASTECTOMY Left 06/2002    left-& lymph node dissection    REMOVAL, PACEMAKER, TEMPORARY TRANSVENOUS  4/10/2025    Procedure: Removal, Pacemaker, Temporary Transvenous;  Surgeon: CAROLINE Solis MD;  Location: [facility] EP LAB;  Service: Cardiology;;    REPLACEMENT OF STENT Right 5/30/2022    Procedure: REPLACEMENT, STENT;  Surgeon: Bonnie Knutson MD;  Location: [facility] OR 1ST FLR;  Service: Urology;  Laterality: Right;    RETROGRADE PYELOGRAPHY Right 4/28/2022    Procedure: PYELOGRAM, RETROGRADE;  Surgeon: Vik Ware MD;  Location: [facility] OR 1ST FLR;  Service: Urology;  Laterality: Right;    ROBOT-ASSISTED LAPAROSCOPIC PARTIAL NEPHRECTOMY USING DA JESÚS XI Right 3/11/2021    Procedure: XI ROBOTIC NEPHRECTOMY, PARTIAL;  Surgeon: Taz Ortega MD;  Location: [facility] OR 2ND FLR;  Service: Urology;  Laterality: Right;  4hr/ gen with regional  [facility] confirmation:  [id number] for 11:15am case NC    URETEROSCOPIC REMOVAL OF URETERIC CALCULUS Right 5/30/2022    Procedure: REMOVAL, CALCULUS, URETER, URETEROSCOPIC;  Surgeon: Bonnie Knutson MD;  Location: [facility] OR 1ST FLR;  Service: Urology;  Laterality: Right;    ureteroscopy Bilateral     6.14     URETEROSCOPY Right 5/30/2022    Procedure: URETEROSCOPY;  Surgeon: Bonnie Knutson MD;  Location: [facility] OR [address] [place] [address];  Service: Urology;  Laterality: Right;       Review of patient's allergies indicates:   Allergen Reactions    Adhesive tape-silicones     Adhesive Rash     Pt states she removed a LATEX bandaid and the skin beneath was swollen and red. No other latex causes a reaction.       Medications:  Continuous Infusions:  Scheduled Meds:   apixaban  5 mg Oral BID    levothyroxine  50 mcg Oral Before breakfast    LIDOcaine  1 patch Transdermal Q24H    metoprolol tartrate  12.5 mg Oral BID    scopolamine  1 patch Transdermal Q3 Days     PRN Meds:  Current Facility-Administered Medications:     acetaminophen, 650 mg, Oral, Q6H PRN    albuterol-ipratropium, 3 mL, Nebulization, Q6H PRN    dextrose 50%, 12.5 g, Intravenous, PRN    dextrose 50%, 25 g, Intravenous, PRN    glucagon (human recombinant), 1 mg, Intramuscular, PRN    glucose, 16 g, Oral, PRN    glucose, 24 g, Oral, PRN    hydrALAZINE, 10 mg, Oral, Q8H PRN    hydrOXYzine HCL, 25 mg, Oral, TID PRN    nitroGLYCERIN, 0.4 mg, Sublingual, Q5 Min PRN    ondansetron, 8 mg, Oral, Q8H PRN    polyethylene glycol, 17 g, Oral, BID PRN    senna, 8.6 mg, Oral, Daily PRN    simethicone, 1 tablet, Oral, TID PRN    sodium chloride, 1 spray, Each Nostril, PRN    sodium chloride 0.9%, 10 mL, Intravenous, PRN    Family History       Problem Relation (Age of Onset)    Arthritis Mother, Sister    Bone cancer Mother    Breast cancer Mother    Cancer Father    Crohn's disease Daughter    Fibroids Daughter    No Known Problems Brother, Daughter          Tobacco Use    Smoking status: Former     Current packs/day: 0.00     Average packs/day: 1 pack/day for 50.0 years (50.0 ttl pk-yrs)     Types: Cigarettes     Start date: 1960     Quit date: 5/20/2009     Years since quitting: 15.9    Smokeless tobacco: Former   Substance and Sexual Activity    Alcohol use: No    Drug use: No     Sexual activity: Not Currently     Partners: Male     Birth control/protection: Partner-Vasectomy       Review of Systems   Constitutional:  Positive for activity change and fatigue.   HENT:  Positive for voice change.    Respiratory:  Positive for shortness of breath.    Cardiovascular:  Positive for leg swelling.   Gastrointestinal:  Positive for nausea.   Neurological:  Positive for weakness.   Hematological:  Bruises/bleeds easily.     Objective:     Vital Signs (Most Recent):  Temp: 97.5 °F (36.4 °C) (04/28/25 1109)  Pulse: 77 (04/28/25 1109)  Resp: 19 (04/28/25 1109)  BP: 128/60 (04/28/25 1109)  SpO2: (!) 92 % (04/28/25 1109) Vital Signs (24h Range):  Temp:  [94.6 °F (34.8 °C)-97.5 °F (36.4 °C)] 97.5 °F (36.4 °C)  Pulse:  [72-84] 77  Resp:  [14-19] 19  SpO2:  [92 %-95 %] 92 %  BP: (128-175)/(60-80) 128/60     Weight: 71.5 kg (157 lb 10.1 oz)  Body mass index is 27.06 kg/m².       Physical Exam  Vitals and nursing note reviewed.   Constitutional:       General: She is not in acute distress.     Appearance: She is ill-appearing.   HENT:      Head: Normocephalic and atraumatic.      Nose: Nose normal.      Mouth/Throat:      Mouth: Mucous membranes are dry.   Eyes:      Extraocular Movements: Extraocular movements intact.   Cardiovascular:      Rate and Rhythm: Normal rate.   Pulmonary:      Effort: Pulmonary effort is normal.   Skin:     General: Skin is warm and dry.   Neurological:      Mental Status: She is alert and oriented to person, place, and time.      Motor: Weakness present.   Psychiatric:         Mood and Affect: Mood normal.         Behavior: Behavior normal.         Thought Content: Thought content normal.         Judgment: Judgment normal.            Review of Symptoms      Symptom Assessment (ESAS 0-10 Scale)  Pain:  0  Dyspnea:  0  Anxiety:  0  Nausea:  0  Depression:  0  Anorexia:  0  Fatigue:  0  Insomnia:  0  Restlessness:  0  Agitation:  0     CAM / Delirium:  Negative  Constipation:   Negative  Diarrhea:  Negative      Modified Berto Scale:  1    Performance Status:  50    Psychosocial/Cultural:   See Palliative Psychosocial Note: Yes  - Previously , now .  - Prior to patient's hospitalizations, she was living alone. Daughter lived in neighborhood and assisted patient with cleaning and cooking  - Over the last year, patient/daughters report patient has spent more time in the hospital/rehab than at home.   - Patient states he enjoys sitting on her porch, gardening in her garden pots, and spending time with her tabby cat (male, Romeo)   - Patient's primary support system includes her 2 daughters, Sirisha and Esther.  - previously enjoyed driving, shopping at [employer], [employer], and [place], but states it has been a while since she has been able to do all the things she enjoys  **Primary  to Follow**  Palliative Care  Consult: Yes    Spiritual:  F - Megan and Belief:  Yarsanism        Advance Care Planning  Advance Directives:   Living Will: Yes        Copy on chart: Yes    [place]POST: No    Do Not Resuscitate Status: Yes    Medical Power of : Yes    Agent's Name:  Esther Barajas   Agent's Contact Number:  [phone]    Decision Making:  Patient answered questions and Family answered questions  Goals of Care: The patient endorses that what is most important right now is to focus on spending time at home, remaining as independent as possible, quality of life, even if it means sacrificing a little time, and continuing infectious workup    Accordingly, we have decided that the best plan to meet the patient's goals includes continuing with treatment and allowing for additional time for goals of care discussions           Significant Labs: All pertinent labs within the past 24 hours have been reviewed.  CBC:   Recent Labs   Lab 04/28/25 0457   WBC 6.31   HGB 11.2*   HCT 35.8*   MCV 91        BMP:  Recent Labs   Lab 04/28/25 0457   *   K  3.9   *   CO2 28   BUN 39*   CREATININE 0.9   CALCIUM 10.4   MG 2.1     LFT:  Lab Results   Component Value Date    AST 51 (H) 04/28/2025    ALKPHOS 102 04/28/2025    BILITOT 0.5 04/28/2025     Albumin:   Albumin   Date Value Ref Range Status   04/28/2025 2.1 (L) 3.5 - 5.2 g/dL Final   02/18/2025 2.8 (L) 3.5 - 5.2 g/dL Final     Protein:   Total Protein   Date Value Ref Range Status   02/18/2025 6.0 6.0 - 8.4 g/dL Final     Lactic acid:   Lab Results   Component Value Date    LACTATE 1.6 04/21/2025    LACTATE 0.8 04/20/2025       Significant Imaging: I have reviewed all pertinent imaging results/findings within the past 24 hours.      WBC Whole Body Scan 4/25/25  Impression:     The mild diffuse increased uptake around the left knee joint likely related to infectious/ inflammatory arthritis.  No other foci of abnormal increased uptake to suggest other source of infection          I spent a total of 50 minutes on the day of the visit. This includes face to face time in discussion of goals of care, symptom assessment, coordination of care and emotional support. This also includes non-face to face time preparing to see the patient (eg, review of tests/imaging), obtaining and/or reviewing separately obtained history, documenting clinical information in the electronic or other health record, independently interpreting results and communicating results to the patient/family/caregiver, or care coordinator.     Additional 75min time spent on a voluntary advance care planning and /or goals of care discussion, providing emotional support, formulating, and communicating prognosis and exploring burden/benefit of various approaches of treatment.       Krystyna Hdz NP  Palliative Medicine  [facility]

## 2025-04-28 NOTE — PLAN OF CARE
Problem: Skin Injury Risk Increased  Goal: Skin Health and Integrity  Outcome: Progressing     Problem: Adult Inpatient Plan of Care  Goal: Plan of Care Review  Outcome: Progressing  Goal: Patient-Specific Goal (Individualized)  Outcome: Progressing     Problem: Fall Injury Risk  Goal: Absence of Fall and Fall-Related Injury  Outcome: Progressing     Problem: Infection  Goal: Absence of Infection Signs and Symptoms  Outcome: Progressing     Problem: Wound  Goal: Optimal Coping  Outcome: Progressing

## 2025-04-28 NOTE — ASSESSMENT & PLAN NOTE
Creatine stable for now. BMP reviewed- noted Estimated Creatinine Clearance: 47.5 mL/min (based on SCr of 0.9 mg/dL). according to latest data. Based on current GFR, CKD stage is stage 4 - GFR 15-29.  Monitor UOP and serial BMP and adjust therapy as needed. Renally dose meds. Avoid nephrotoxic medications and procedures.

## 2025-04-28 NOTE — ASSESSMENT & PLAN NOTE
Complex medical history with overall poor prognosis.  Significant debility.  Notable pleural effusions ?  Contributing to some degree of shortness of breath and chest discomfort.  Pan CT done recently no reports of intra-abdominal pathology to account for abdominal pain.  Intermittent hypernatremia ?  Poor oral intake.  Difficulties with fluid status management.  Recommending palliative care at this time, however family to decide whether or not they want to do skilled nursing facility or pursue palliative care/hospice.    4/28  Decision made for hospice/palliative care. F/u with them

## 2025-04-28 NOTE — SUBJECTIVE & OBJECTIVE
Interval History: Hypothermia of 94.6 this am. Sodium of 151.  Plan for hospice/palliative care at this time.  Palliative Care to reach out to daughters    Review of Systems   Unable to perform ROS: Other     Objective:     Vital Signs (Most Recent):  Temp: 97.5 °F (36.4 °C) (ambient setting) (04/28/25 0644)  Pulse: 74 (04/28/25 1020)  Resp: 18 (04/28/25 0700)  BP: (!) 172/75 (04/28/25 0816)  SpO2: (!) 94 % (04/28/25 0700) Vital Signs (24h Range):  Temp:  [94.6 °F (34.8 °C)-97.5 °F (36.4 °C)] 97.5 °F (36.4 °C)  Pulse:  [72-84] 74  Resp:  [14-18] 18  SpO2:  [93 %-95 %] 94 %  BP: (132-175)/(62-80) 172/75     Weight: 71.5 kg (157 lb 10.1 oz)  Body mass index is 27.06 kg/m².    Intake/Output Summary (Last 24 hours) at 4/28/2025 1042  Last data filed at 4/28/2025 0906  Gross per 24 hour   Intake 30 ml   Output 575 ml   Net -545 ml                Physical Exam  Constitutional:       Appearance: She is ill-appearing.   HENT:      Head: Normocephalic.      Right Ear: External ear normal.      Left Ear: External ear normal.      Nose: Nose normal.      Comments: Nasal cannula  Cardiovascular:      Rate and Rhythm: Normal rate.      Heart sounds: Normal heart sounds.   Pulmonary:      Breath sounds: Normal breath sounds.   Abdominal:      Palpations: Abdomen is soft.      Tenderness: There is no abdominal tenderness.   Musculoskeletal:      Right lower leg: No edema.      Left lower leg: No edema.   Skin:     Findings: Bruising present.   Neurological:      Mental Status: She is alert. Mental status is at baseline.              Significant Labs: All pertinent labs within the past 24 hours have been reviewed.

## 2025-04-29 PROBLEM — Z51.5 COMFORT MEASURES ONLY STATUS: Status: ACTIVE | Noted: 2025-04-29

## 2025-04-29 NOTE — ASSESSMENT & PLAN NOTE
Impression:  Misa Barajas is a 79 y.o. female with PMHx of COPD on 2L home O2, HFpEF, AFL s/p RFA (6/17/24 w/ Dr. Church), CKD III, HTN, HLD, chronic venous insufficiency, orthostatic hypotension who was brought via EMS after per history she was found confused and bradycardiac with HR in the 20s. Patient was percutaneously paced. During hospitalization, was intubated then extubated with residual hoarseness of voice (ENT following), required intermittent pacing by TVP, a Micra PM by EP placed 04/10, aggressive diuresing, recurrent sepsis and hypothermia, patient has required several trials of antibiotics, anticipating WBC scan on 4/25 (ID following), and presumed PE (unable to verify with CTA secondary to MARQUEZ).      Of note, patient has had 7x ED visits in the last year. Patient has engaged in goals of care discussions with Dr. Velasquez of Palliative Medicine for goals of care and symptom management. Palliative Medicine was consulted by primary, Dr. Antunez, for goals of care and advanced care planning discussions.    Patient is currently resting in bed, AAOx4, daughters (Sirisha and Esther) at bedside. Patient and family are amenable to Pal Med. Pal Med APRN and LCSW at bedside for goals of care discussions.     - Prior experience with serious illness: yes  -The patient has previously engaged in advance care planning or GOC discussions  - Insight/Understanding of illness: yes      Advance Care Planning     Date: 04/29/2025  - Family meeting at bedside with Esther Grimm, Dr. Antunez, and HAFSA Hdz, RAYMOND.   - Philosophies of hospice reviewed.   - Patient and family amenable to pivoting to comfort-focus care and transferring to [facility].  - Case discussed with [facility] Attending, Dr. aMyo and case management team.   - Transfer orders placed. Sirisha and Esther notified via telephone.     Date: 04/28/2025  - Patient resting in bed, 4L O2, jeffrey hugger warming blanket in place, no acute distress upon my visit. Patient's daughters arrive at  "bedside shortly after.   - Daughters requested we meet outside of room so that we do not disturb patient's rest. I met with patient's daughter in 3rd floor Visitor's Garden.  - We discussed, patient's recent hospital encounters, current hospitalization, and patient's progressive health decline.   - Daughters acknowledge patient's journey in/out of hospital for past year has been tiring and exhausting and are interested in exploring hospice options. Sirisha states she has been trying to juggle working and caring for her mom after her work day, but is limited in the care that she gives her mom because she has no past medical background. Esther states she has been fortunate to be able to work from home to be with her mom more often. Both daughters acknowledged the emotional strain caring for their mom has caused. Daughters state when something "goes wrong", they try to do as much as they can (limited to checking a blood pressure, O2 sats, and giving a dose of zofran). Once they have exhausted those interventions, they call 911 because they know they are "in over their heads" to get additional help. Patient is then admitted to hospital for additional workup. Sirisha states that it has been so traumatizing when she sees her mom is sick, tries to do everything she can to care for her mom, notices it is not working, and hears her mom begging for help, but refuses to go to the hospital. This has created immense stress on the daughters. Patient never wants to go to the hospital, but eventually agrees to come to ED because either daughters convince her or because patient becomes too sick to independently make a decision. Sirisha states she is worried that she is "giving up" on her mom even though patient has been "fighting" for so long. Philosophies of hospice introduced and discussed. Esther states she has been caring for and thinking about how her mom has progressively declined over the last year and expresses desires to " "prioritize patient's comfort knowing that with comfort being the priority, life will be limited. Daughters acknowledged patient wants to go home, but state they do not have the resources to continue to care for the patient at home. Daughters inquired about inpatient hospice unit, discussed in-patient hospice criteria. During conversation, daughters became appropriately tearful. Emotional support provided. Daughters asked for additional time to process steps forward and where the patient would continued to be cared for.   - After speaking with the daughters, I rounded on the patient again. Patient was awake and amenable to Pal Med. Patient acknowledges her health decline and states it has been taxing and tiring as she has had multiple hospitalization, including the current encounter. Patient agrees that her comfort has not been prioritized over the last year and if open to talking more about hospice care. Philosophies of hospice were introduced and discussed. Patient is accepting, and states she would like to go home. I informed patient that daughters expressed concerns being able to care for patient and would like to talk more about facility placement/in-patient hospice unit. Patient expressed that she was overwhelmed with the conversation and describes it as "a lot." Patient acknowledges she was not expecting to be having this [hospice] conversation so soon, but understands that we need to have it because she has not had much quality of life lately. Patient states she would like additional time to talk with her daughters.   - Tentative family meeting 4/29/30 at 0900. Primary team, Dr. Antunez, updated via secure chat.    Date: 04/25/2025  - No acute events overnight. Patient anticipates NM Inflammatory Whole Body scan today.  - Revisited goals of care discussion from yesterday. Patient acknowledged it was a difficult conversation to have, but has been open to it. As we anticipate more information from the pending " studies, goals of care discussions are also pending. Patient expressed concerns regarding nuclear scan and when it will be getting done. Patient states she thought it would have been done by now. I followed up with radiology to inquire about scan time, and updated patient on the 1-3pm time frame that radiology hopes to see patient. Patient's daughter Esther was at bedside and updated.   - Patient with no needs at this time.  - Primary team updated.        Date: 04/24/2025    Code Status  In light of the patients advanced and life limiting illness, I engaged the patient and family in a voluntary conversation about the patient's preferences for care  at the very end of life. The patient wishes to have a natural, peaceful death.  Along those lines, the patient does not wish to have CPR or other invasive treatments performed when her heart and/or breathing stops. I communicated to the patient and family that a DNR order would be placed in her medical record to reflect this preference.        [facility]  I engaged the patient and family in a voluntary conversation about advance care planning and we specifically addressed what the goals of care would be moving forward, in light of the patient's change in clinical status, specifically recurrent hospitalizations over past year, hypothermia, acute renal failure, CHF exacerbation, and current hosptialization.  We did specifically address the patient's likely prognosis, which  appears to be poor .  We explored the patient's values and preferences for future care.  The patient and family endorses that what is most important right now is to focus on spending time at home, avoiding the hospital, quality of life, even if it means sacrificing a little time, and allowing additional time for infectious workup    Accordingly, we have decided that the best plan to meet the patient's goals includes continuing with treatment and continuing goals of care discussions pending infectious  workup               Life Limiting Diagnosis  Acute on Chronic CHF  Acute Renal Failure on CKD stage 3b  Complete Heart Block            Symptom Management  - Pain: no  - acetaminophen 650mg q6h PRN    - Dyspnea: no  - on 2L at home  - maintained on 4L O2  - 25h SpO2: 90-99%    - Constipation: no  - senna-docusate daily PRN  - poluethylene glycol daily PRN    - Nausea: no  - ondansetron 8mg q8h    - Debility: yes  - Functional status: fair.  Pt was not able to manage ADLs  - Fall precautions  - PT/OT recommending moderate intensity therapy          Recommendations  - Please see above  - Continue management per primary team  - Patient and family would benefit from continued education and information regarding plan of care, prognosis, and what to expect in the future  - Most important goals at this time: completing infectious workup and obtaining more information regarding plan of care   - Code status: DNR  - Disposition: TBD        The above recommendations communicated directly to primary team on 4/29/25  Thank you for consulting Palliative Medicine.

## 2025-04-29 NOTE — SUBJECTIVE & OBJECTIVE
Interval History:  Meeting held with palliative Care, decision for hospice, figuring out dispo plans at this time    Review of Systems   Unable to perform ROS: Other     Objective:     Vital Signs (Most Recent):  Temp: 97.5 °F (36.4 °C) (04/29/25 1119)  Pulse: 70 (04/29/25 1119)  Resp: 19 (04/29/25 1119)  BP: 129/62 (04/29/25 1119)  SpO2: (!) 93 % (04/29/25 1119) Vital Signs (24h Range):  Temp:  [95 °F (35 °C)-98.5 °F (36.9 °C)] 97.5 °F (36.4 °C)  Pulse:  [66-80] 70  Resp:  [18-19] 19  SpO2:  [92 %-96 %] 93 %  BP: (129-162)/(62-77) 129/62     Weight: 70.5 kg (155 lb 6.8 oz)  Body mass index is 26.68 kg/m².    Intake/Output Summary (Last 24 hours) at 4/29/2025 1319  Last data filed at 4/29/2025 1309  Gross per 24 hour   Intake 50 ml   Output 450 ml   Net -400 ml              Physical Exam  Constitutional:       Appearance: She is ill-appearing.   HENT:      Head: Normocephalic.      Right Ear: External ear normal.      Left Ear: External ear normal.      Nose: Nose normal.      Comments: Nasal cannula  Cardiovascular:      Rate and Rhythm: Normal rate.      Heart sounds: Normal heart sounds.   Pulmonary:      Breath sounds: Normal breath sounds.   Abdominal:      Palpations: Abdomen is soft.      Tenderness: There is no abdominal tenderness.   Musculoskeletal:      Right lower leg: No edema.      Left lower leg: No edema.   Skin:     Findings: Bruising present.   Neurological:      Mental Status: She is alert. Mental status is at baseline.              Significant Labs: All pertinent labs within the past 24 hours have been reviewed.

## 2025-04-29 NOTE — ASSESSMENT & PLAN NOTE
Anemia is likely due to chronic disease due to Chronic Kidney Disease. Most recent hemoglobin and hematocrit are listed below.  Recent Labs     04/27/25  0541 04/28/25  0457 04/29/25  0526   HGB 12.0 11.2* 11.5*   HCT 38.7 35.8* 37.3     Plan  - Monitor serial CBC: Daily  - Transfuse PRBC if patient becomes hemodynamically unstable, symptomatic or H/H drops below 7/21.  - Patient has not received any PRBC transfusions to date  - Patient's anemia is currently stable

## 2025-04-29 NOTE — PROGRESS NOTES
[facility]  Palliative Medicine  Progress Note    Patient Name: Misa Barajas  MRN: [id number]  Admission Date: 4/6/2025  Hospital Length of Stay: 23 days  Code Status: DNR   Attending Provider: Aquilino Mayo MD  Consulting Provider: Krystyna Hdz NP  Primary Care Physician: Isela Langston MD  Principal Problem:Palliative care encounter    Patient information was obtained from patient, relative(s), past medical records, and primary team.      Assessment/Plan:     Palliative Care  * Palliative care encounter  Impression:  Misa Barajas is a 79 y.o. female with PMHx of COPD on 2L home O2, HFpEF, AFL s/p RFA (6/17/24 w/ Dr. Church), CKD III, HTN, HLD, chronic venous insufficiency, orthostatic hypotension who was brought via EMS after per history she was found confused and bradycardiac with HR in the 20s. Patient was percutaneously paced. During hospitalization, was intubated then extubated with residual hoarseness of voice (ENT following), required intermittent pacing by TVP, a Micra PM by EP placed 04/10, aggressive diuresing, recurrent sepsis and hypothermia, patient has required several trials of antibiotics, anticipating WBC scan on 4/25 (ID following), and presumed PE (unable to verify with CTA secondary to MARQUEZ).      Of note, patient has had 7x ED visits in the last year. Patient has engaged in goals of care discussions with Dr. Velasquez of Palliative Medicine for goals of care and symptom management. Palliative Medicine was consulted by primary, Dr. Antunez, for goals of care and advanced care planning discussions.    Patient is currently resting in bed, AAOx4, daughters (Sirisha and Esther) at bedside. Patient and family are amenable to Pal Med. Pal Med APRN and LCSW at bedside for goals of care discussions.     - Prior experience with serious illness: yes  -The patient has previously engaged in advance care planning or GOC discussions  - Insight/Understanding of illness:  "yes      Advance Care Planning    Date: 04/29/2025  - Family meeting at bedside with Esther Grimm, Dr. Antunez, and HAFSA Hdz DNP.   - Philosophies of hospice reviewed.   - Patient and family amenable to pivoting to comfort-focus care and transferring to [facility].  - Case discussed with [facility] Attending, Dr. Mayo and case management team.   - Transfer orders placed. Sirisha and Esther notified via telephone.     Date: 04/28/2025  - Patient resting in bed, 4L O2, jeffrey hugger warming blanket in place, no acute distress upon my visit. Patient's daughters arrive at bedside shortly after.   - Daughters requested we meet outside of room so that we do not disturb patient's rest. I met with patient's daughter in 3rd floor Vis[facility]'s [facility].  - We discussed, patient's recent hospital encounters, current hospitalization, and patient's progressive health decline.   - Daughters acknowledge patient's journey in/out of hospital for past year has been tiring and exhausting and are interested in exploring hospice options. Sirisha states she has been trying to juggle working and caring for her mom after her work day, but is limited in the care that she gives her mom because she has no past medical background. Esther states she has been fortunate to be able to work from home to be with her mom more often. Both daughters acknowledged the emotional strain caring for their mom has caused. Daughters state when something "goes wrong", they try to do as much as they can (limited to checking a blood pressure, O2 sats, and giving a dose of zofran). Once they have exhausted those interventions, they call 911 because they know they are "in over their heads" to get additional help. Patient is then admitted to hospital for additional workup. Sirisha states that it has been so traumatizing when she sees her mom is sick, tries to do everything she can to care for her mom, notices it is not working, and hears her mom begging for help, but refuses to go to the " "hospital. This has created immense stress on the daughters. Patient never wants to go to the hospital, but eventually agrees to come to ED because either daughters convince her or because patient becomes too sick to independently make a decision. Sirisha states she is worried that she is "giving up" on her mom even though patient has been "fighting" for so long. Philosophies of hospice introduced and discussed. Esther states she has been caring for and thinking about how her mom has progressively declined over the last year and expresses desires to prioritize patient's comfort knowing that with comfort being the priority, life will be limited. Daughters acknowledged patient wants to go home, but state they do not have the resources to continue to care for the patient at home. Daughters inquired about inpatient hospice unit, discussed in-patient hospice criteria. During conversation, daughters became appropriately tearful. Emotional support provided. Daughters asked for additional time to process steps forward and where the patient would continued to be cared for.   - After speaking with the daughters, I rounded on the patient again. Patient was awake and amenable to Pal Med. Patient acknowledges her health decline and states it has been taxing and tiring as she has had multiple hospitalization, including the current encounter. Patient agrees that her comfort has not been prioritized over the last year and if open to talking more about hospice care. Philosophies of hospice were introduced and discussed. Patient is accepting, and states she would like to go home. I informed patient that daughters expressed concerns being able to care for patient and would like to talk more about facility placement/in-patient hospice unit. Patient expressed that she was overwhelmed with the conversation and describes it as "a lot." Patient acknowledges she was not expecting to be having this [hospice] conversation so soon, but " understands that we need to have it because she has not had much quality of life lately. Patient states she would like additional time to talk with her daughters.   - Tentative family meeting 4/29/30 at 0900. Primary team, Dr. Antunez, updated via secure chat.    Date: 04/25/2025  - No acute events overnight. Patient anticipates NM Inflammatory Whole Body scan today.  - Revisited goals of care discussion from yesterday. Patient acknowledged it was a difficult conversation to have, but has been open to it. As we anticipate more information from the pending studies, goals of care discussions are also pending. Patient expressed concerns regarding nuclear scan and when it will be getting done. Patient states she thought it would have been done by now. I followed up with radiology to inquire about scan time, and updated patient on the 1-3pm time frame that radiology hopes to see patient. Patient's daughter Esther was at bedside and updated.   - Patient with no needs at this time.  - Primary team updated.        Date: 04/24/2025    Code Status  In light of the patients advanced and life limiting illness, I engaged the patient and family in a voluntary conversation about the patient's preferences for care  at the very end of life. The patient wishes to have a natural, peaceful death.  Along those lines, the patient does not wish to have CPR or other invasive treatments performed when her heart and/or breathing stops. I communicated to the patient and family that a DNR order would be placed in her medical record to reflect this preference.        [facility]  I engaged the patient and family in a voluntary conversation about advance care planning and we specifically addressed what the goals of care would be moving forward, in light of the patient's change in clinical status, specifically recurrent hospitalizations over past year, hypothermia, acute renal failure, CHF exacerbation, and current hosptialization.  We did specifically  "address the patient's likely prognosis, which  appears to be poor .  We explored the patient's values and preferences for future care.  The patient and family endorses that what is most important right now is to focus on spending time at home, avoiding the hospital, quality of life, even if it means sacrificing a little time, and allowing additional time for infectious workup    Accordingly, we have decided that the best plan to meet the patient's goals includes continuing with treatment and continuing goals of care discussions pending infectious workup               Life Limiting Diagnosis  Acute on Chronic CHF  Acute Renal Failure on CKD stage 3b  Complete Heart Block            Symptom Management  - Pain: no  - acetaminophen 650mg q6h PRN    - Dyspnea: no  - on 2L at home  - maintained on 4L O2  - 25h SpO2: 90-99%    - Constipation: no  - senna-docusate daily PRN  - poluethylene glycol daily PRN    - Nausea: no  - ondansetron 8mg q8h    - Debility: yes  - Functional status: fair.  Pt was not able to manage ADLs  - Fall precautions  - PT/OT recommending moderate intensity therapy          Recommendations  - Please see above  - Continue management per primary team  - Patient and family would benefit from continued education and information regarding plan of care, prognosis, and what to expect in the future  - Most important goals at this time: completing infectious workup and obtaining more information regarding plan of care   - Code status: DNR  - Disposition: TBD        The above recommendations communicated directly to primary team on 4/29/25  Thank you for consulting Palliative Medicine.                I will sign off. Please contact us if you have any additional questions.    Subjective:     Chief Complaint:   Chief Complaint   Patient presents with    Bradycardia     Arrives via EMS hypotensive 80s/50s and bradycadic in the 20s.       HPI:   As per H&P, "Misa Barajas is a 79 y.o. female COPD on 2L home " O2, HFpEF, AFL s/p RFA (6/17/24 w/ Dr. Church), CKD III, HTN, HLD, chronic venous insufficiency, orthostatic hypotension who was brought via EMS after per history she was found confused and bradycardiac with HR in the 20s.They started epi and transcutaneous pacing. Given Versed for the transcutaneous pacing. They had to bag her in route. We do not have strip of underlying rhythm. Patient obtunded for which she was intubated for airway protection on arrival to the ED and transcutaneous pacing was exchanged to transvenous pacing via RIJ (threshold 0.6). Underlying rate 44 bifascicular block. Known RBBB. Per daughters patient had called them over the phone when they noticed she appeared confused. Due to concerns for a possible syncopal episode EMS was called. Prior to event patient had been complaining of malaise and nausea for which she took about two Zofran pills per the daughters.      She had been recently seen on 3/27/25 at the [facility] visit following recent admission for AHRF 2/2 ADHF and COPD exacerbation. On ED presentation BNP elevated to 2500, troponin elevated to 19, CXR with pulmonary edema. She was started on steroids, nebs and antibiotics in addition to IV diuresis. Updated TTE stable from prior, showing HFpEF with elevated CVP 15. Her O2 requirements improved to baseline with diuresis and she was subsequently discharged. At that visit patient had not required midodrine as blood pressure had remained stable and she was asymptomatic.      Off note, patient was taken off OAC due to bleeding issues and no recurrent of typical atrial flutter after ablation.      Overview/Hospital Course:  Admitted to CCU for CHB in s/o hyperkalemia & in the presence of conduction disease/RBBB & LAHB - stable w/ temporary wire. ??Septic shock deemed to be d/t UTI - ?? UA with only 7 WBCs); vasopressors weaned off. HTN; started NG gtt. Acute on chronic hypoxic/hypercapnic RF & obtunded; intubated. Successfully extubated 04/08.  "MARQUEZ on CKD; improved w/ good UOP response to diuresis. PVCs w/ intermittent pacing by TVP. Switched pip-tazo to amox-clav on 04/09; continued to improve; empiric EED 04/13 for 7 day course. Micra PM by EP placed 04/10. Medically stable for stepdown. Patient was extremely short of breath with physical therapy, chest x-ray concerning for pulmonary edema.  Started patient on IV diuresis, now euvolemic.  ENT was consulted yesterday due to hoarseness of voice.  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 Pt uninterested in any procedure at this time, ENT recommended SLP follow up.  Able to tolerate regular diet at this point.  Now agreeable for SNF, working on placement. Course c/b worsening hypoxia & hyperNa.       Overnight 4/18-4/19, decompensated with recurrent sepsis, hypothermia 92, possible PNA with infiltrates on CXR, Zosyn IV started. Remained hypothermic despite Zosyn x 2 doses and Manny hugger in place (pt uncomfortable/hot and asking for removal), so added Vanc IV STAT. Checked TSH and FT4-  wnl. UA with pyuria however not a clean catch w 33 sq epi, so repeated UA. C/o "stomach" pain, overall picture with the "stomach" pain and septic picture is "exactly what led us to ICU 2 weeks ago" per daughter. +TTP RUQ, RLQ, LLQ.   Poor UOP, only 200cc by mid-shift, and still hyperNa with FWD 1.8L so started gentle hydration with D5W @ 75cc for 24 hrs. May need to diurese again after sepsis resolves. CT C/A/P noncon with pulm edema, B pleural effusions, possible PNA, no intra-abdominal process seen.      On 4/21, Mental status much improved, however still with mild confusion and feels very tired.   decompensated further with recurrent MARQUEZ, C/o new SOB and hypoxemic 90% on 4L (over her baseline of 2L at home), improved to >94% on 6L with improvement of SOB.  C/o new L chest pressure/pain 4/10 (non-radiating), c/f MI or PE, resolved after nitro SL x 1 however HypoT with SBP upper " 80s so will avoid further nitro.  HS trop 38 (decreased from prior), BNP 700s (increased from prior).CXR with pulm edema, all most c/w ADHF- Lasix 40 IV x 1 given.    Concern for untreated sepsis with ongoing hypothermia requiring jeffrey hugger despite Vanc/Zosyn for over 24 hours, so broadened to Vanc/Sher/Dinah, consulted ID, repeated BCx x 2. Lactate wnl.  Re: concern for PE, Known LIJ DVT, apixaban x several days then stopped (likely for hemorrhage of vocal fold)- discussed w ENT 4/20, ok to resume anticoag if needed. PE workup- F/u STAT UE and LE Dopplers. Unable to check CTA chest given MARQUEZ, unable to check V/Q given pulm edema. Anticoagulation with Apixaban.  Hypothermia returned however pt looks and feels the best she has in 4 days.  Voice is getting stronger.  Sitting in chair.  UCx resulted with ESBL Klebsiella, so Sher has been good coverage.  ID changed to Dapto (from Vanc), continuing Sher, for coverage of UTI and well as PNA.  BCx have all been negative.  Continuing Eliquis for DVt LUE and presumed PE.  Acute hypox resp failure 2/2 ADHF and presumed PE, still on 6L, dosing Lasix IV PRN daily given yet-unclear status of her sepsis and avoiding volume depletion in sepsis.      Prior to admit, was independent in her residence with her cat.  Two daughters very supportive.         Interval History: Pt seen and examined this morning on rounds. Hypothermia ongoing this morning, required Jeffrey hugger.  Resolved in afternoon.  If hypothermia returns overnight, will repeat BCx x 2.  Attempting sputum sample for Cx as well, not optimistic for any yield. NM Full body tagged WBC scan per ID recs- will get injection tomorrow 4/24 then scan on 4/25.      Re-dosed Lasix at higher dose 80mg IV x 1, re-eval again tomorrow.      Pall care consulted, NOT for hospice, NOT due to a downward clinical trajectory, as pt shows signs of clinical improvement except for the hypothermia -- consult is rather for continuity and  "continuation of ACP/GOC started as outpt prior to admission.       Care plan reviewed. Otherwise, doing well and with no further complaints at this time."        Hospital Course:  No notes on file    Interval History: No acute events overnight. Goals of care discussion continued. Patient pivoted to comfort focused care.     Past Medical History:   Diagnosis Date    Acute biliary pancreatitis 07/27/2024    Acute pancreatitis 07/27/2024    Acute pulmonary embolism 4/21/2025    Anemia 07/12/2024    Aortic atherosclerosis     Arthritis     knee joint pain    Asthma-COPD overlap syndrome 08/22/2022    home o2    Breast cancer 2002    left breast & lymph nodes-s/p sx with chemo    Cataracts, bilateral     Chronic diastolic heart failure 12/24/2019    Chronic kidney disease, stage 3     Class 1 obesity due to excess calories with serious comorbidity and body mass index (BMI) of 30.0 to 30.9 in adult 05/16/2024    Gram-negative bacteremia - Pasturella multocida 07/12/2024    History of chemotherapy     last treatment 12/2002 (had 8 treatments)    Hyperlipidemia 11/20/2016    Hypertension     Lymphedema of both lower extremities 01/25/2022    Nephrolithiasis 06/02/2014    Osteoporosis     Paroxysmal atrial fibrillation 06/07/2021    Renal osteodystrophy 01/14/2016    Sepsis 4/19/2025    Severe protein-calorie malnutrition 4/23/2025    Shock 04/06/2025    Subclinical hypothyroidism 04/06/2025    Venous stasis dermatitis of both lower extremities 01/25/2022    Vitamin D insufficiency        Past Surgical History:   Procedure Laterality Date    ABLATION, ATRIAL FLUTTER, TYPICAL N/A 6/17/2024    Procedure: Ablation, Atrial Flutter, Typical;  Surgeon: Taz Church MD;  Location: [facility];  Service: Cardiology;  Laterality: N/A;  AFL, RFA, LORENE, MAKAYLA (cx if SR), anes, MB, 3 Prep    BREAST BIOPSY Left 2002    core bx, +    BREAST BIOPSY Right 2018    core    BREAST BIOPSY Right 2019    BREAST LUMPECTOMY Left 2002    " CYSTOSCOPY  4/28/2022    Procedure: CYSTOSCOPY;  Surgeon: Vik Ware MD;  Location: [facility] OR [facility] FLR;  Service: Urology;;    CYSTOSCOPY  5/30/2022    Procedure: CYSTOSCOPY;  Surgeon: Bonnie Knutson MD;  Location: [facility] OR 1ST FLR;  Service: Urology;;    ECHOCARDIOGRAM,TRANSESOPHAGEAL N/A 6/17/2024    Procedure: Transesophageal echo (MAKAYLA) intra-procedure log documentation;  Surgeon: Nestor Martinez MD;  Location: [facility] EP LAB;  Service: Cardiology;  Laterality: N/A;    ERCP N/A 7/30/2024    Procedure: ERCP (ENDOSCOPIC RETROGRADE CHOLANGIOPANCREATOGRAPHY);  Surgeon: Sierra Cotto MD;  Location: [facility] ENDO (2ND FLR);  Service: Endoscopy;  Laterality: N/A;    IMPLANTATION OF LEADLESS PACEMAKER N/A 4/10/2025    Procedure: INSERTION, CARDIAC PACEMAKER, LEADLESS;  Surgeon: CAROLINE Solis MD;  Location: [facility] EP LAB;  Service: Cardiology;  Laterality: N/A;  CHB, MICRA leadless PPM, MDT, Anes, EH, CICU 3073    LASER LITHOTRIPSY  5/30/2022    Procedure: LITHOTRIPSY, USING LASER;  Surgeon: Bonnie Knutson MD;  Location: [facility] OR [address] [place] [address];  Service: Urology;;    LITHOTRIPSY      MASTECTOMY Left 06/2002    left-& lymph node dissection    REMOVAL, PACEMAKER, TEMPORARY TRANSVENOUS  4/10/2025    Procedure: Removal, Pacemaker, Temporary Transvenous;  Surgeon: CAROLINE Solis MD;  Location: [facility] EP LAB;  Service: Cardiology;;    REPLACEMENT OF STENT Right 5/30/2022    Procedure: REPLACEMENT, STENT;  Surgeon: Bonnie Knutson MD;  [facility];  Service: Urology;  Laterality: Right;    RETROGRADE PYELOGRAPHY Right 4/28/2022    Procedure: PYELOGRAM, RETROGRADE;  Surgeon: Vik Ware MD;  [facility];  Service: Urology;  Laterality: Right;    ROBOT-ASSISTED LAPAROSCOPIC PARTIAL NEPHRECTOMY USING DA JESÚS XI Right 3/11/2021    Procedure: XI ROBOTIC NEPHRECTOMY, PARTIAL;  Surgeon: Taz Ortega MD;  Location: [facility] OR [address] [place] [address];  Service: Urology;  Laterality: Right;   4hr/ gen with [facility] confirmation:  [id number] for 11:15am case NC    URETEROSCOPIC REMOVAL OF URETERIC CALCULUS Right 5/30/2022    Procedure: REMOVAL, CALCULUS, URETER, URETEROSCOPIC;  Surgeon: Bonnie Knutson MD;  Location: [facility] OR [address] [place] [address];  Service: Urology;  Laterality: Right;    ureteroscopy Bilateral     6.14    URETEROSCOPY Right 5/30/2022    Procedure: URETEROSCOPY;  Surgeon: Bonnie Knutson MD;  Location: [facility] OR [address] [place] [address];  Service: Urology;  Laterality: Right;       Review of patient's allergies indicates:   Allergen Reactions    Adhesive tape-silicones     Adhesive Rash     Pt states she removed a LATEX bandaid and the skin beneath was swollen and red. No other latex causes a reaction.       Medications:  Continuous Infusions:  Scheduled Meds:   apixaban  5 mg Oral BID    levothyroxine  50 mcg Oral Before breakfast    LIDOcaine  1 patch Transdermal Q24H    metoprolol tartrate  12.5 mg Oral BID    scopolamine  1 patch Transdermal Q3 Days     PRN Meds:  Current Facility-Administered Medications:     acetaminophen, 650 mg, Oral, Q6H PRN    albuterol-ipratropium, 3 mL, Nebulization, Q6H PRN    HYDROmorphone, 0.5 mg, Intravenous, Q4H PRN    lorazepam, 0.5 mg, Intravenous, Q30 Min PRN    nitroGLYCERIN, 0.4 mg, Sublingual, Q5 Min PRN    ondansetron, 8 mg, Oral, Q8H PRN    polyethylene glycol, 17 g, Oral, BID PRN    senna, 8.6 mg, Oral, Daily PRN    simethicone, 1 tablet, Oral, TID PRN    Family History       Problem Relation (Age of Onset)    Arthritis Mother, Sister    Bone cancer Mother    Breast cancer Mother    Cancer Father    Crohn's disease Daughter    Fibroids Daughter    No Known Problems Brother, Daughter          Tobacco Use    Smoking status: Former     Current packs/day: 0.00     Average packs/day: 1 pack/day for 50.0 years (50.0 ttl pk-yrs)     Types: Cigarettes     Start date: 1960     Quit date: 5/20/2009     Years since quitting: 15.9    Smokeless tobacco: Former    Substance and Sexual Activity    Alcohol use: No    Drug use: No    Sexual activity: Not Currently     Partners: Male     Birth control/protection: Partner-Vasectomy       Review of Systems   Constitutional:  Positive for activity change and fatigue.   HENT:  Positive for voice change.    Respiratory:  Positive for shortness of breath.    Cardiovascular:  Positive for leg swelling.   Gastrointestinal:  Positive for nausea.   Neurological:  Positive for weakness.   Hematological:  Bruises/bleeds easily.     Objective:     Vital Signs (Most Recent):  Temp: 97.6 °F (36.4 °C) (04/29/25 1649)  Pulse: 76 (04/29/25 1649)  Resp: 16 (04/29/25 1649)  BP: 138/65 (04/29/25 1649)  SpO2: (!) 94 % (04/29/25 1649) Vital Signs (24h Range):  Temp:  [95 °F (35 °C)-98.5 °F (36.9 °C)] 97.6 °F (36.4 °C)  Pulse:  [66-80] 76  Resp:  [16-19] 16  SpO2:  [92 %-96 %] 94 %  BP: (129-162)/(62-77) 138/65     Weight: 70.5 kg (155 lb 6.8 oz)  Body mass index is 26.68 kg/m².       Physical Exam  Vitals and nursing note reviewed.   Constitutional:       General: She is not in acute distress.     Appearance: She is ill-appearing.   HENT:      Head: Normocephalic and atraumatic.      Nose: Nose normal.      Mouth/Throat:      Mouth: Mucous membranes are dry.   Eyes:      Extraocular Movements: Extraocular movements intact.   Cardiovascular:      Rate and Rhythm: Normal rate.   Pulmonary:      Effort: Pulmonary effort is normal.   Skin:     General: Skin is warm and dry.   Neurological:      Mental Status: She is alert and oriented to person, place, and time.      Motor: Weakness present.   Psychiatric:         Mood and Affect: Mood normal.         Behavior: Behavior normal.         Thought Content: Thought content normal.         Judgment: Judgment normal.            Review of Symptoms      Symptom Assessment (ESAS 0-10 Scale)  Pain:  0  Dyspnea:  0  Anxiety:  0  Nausea:  0  Depression:  0  Anorexia:  0  Fatigue:  0  Insomnia:  0  Restlessness:   0  Agitation:  0     CAM / Delirium:  Negative  Constipation:  Negative  Diarrhea:  Negative      Modified Berto Scale:  1    Performance Status:  50    Psychosocial/Cultural:   See Palliative Psychosocial Note: Yes  - Previously , now .  - Prior to patient's hospitalizations, she was living alone. Daughter lived in neighborhood and assisted patient with cleaning and cooking  - Over the last year, patient/daughters report patient has spent more time in the hospital/rehab than at home.   - Patient states he enjoys sitting on her porch, gardening in her garden pots, and spending time with her tabby cat (male, Romeo)   - Patient's primary support system includes her 2 daughters, Sirisha and Esther.  - previously enjoyed driving, shopping at [employer], [employer], and [employer], but states it has been a while since she has been able to do all the things she enjoys  **Primary  to Follow**  Palliative Care  Consult: Yes    Spiritual:  F - Megan and Belief:  Yazidi        Advance Care Planning  Advance Directives:   Living Will: Yes        Copy on chart: Yes    LaPOST: No    Do Not Resuscitate Status: Yes    Medical Power of : Yes    Agent's Name:  Esther Barajas   Agent's Contact Number:  [phone]    Decision Making:  Patient answered questions and Family answered questions  Goals of Care: The patient endorses that what is most important right now is to focus on symptom/pain control, quality of life, even if it means sacrificing a little time, and comfort and QOL     Accordingly, we have decided that the best plan to meet the patient's goals includes enrolling in hospice care and pivot to comfort-focused care           Significant Labs: All pertinent labs within the past 24 hours have been reviewed.  CBC:   Recent Labs   Lab 04/29/25 0526   WBC 6.38   HGB 11.5*   HCT 37.3   MCV 92        BMP:  Recent Labs   Lab 04/29/25 0526   GLU 88   *   K 3.8   CL  117*   CO2 29   BUN 44*   CREATININE 1.0   CALCIUM 10.8*   MG 2.2     LFT:  Lab Results   Component Value Date    AST 47 (H) 04/29/2025    ALKPHOS 107 04/29/2025    BILITOT 0.5 04/29/2025     Albumin:   Albumin   Date Value Ref Range Status   04/29/2025 2.2 (L) 3.5 - 5.2 g/dL Final   02/18/2025 2.8 (L) 3.5 - 5.2 g/dL Final     Protein:   Protein Total   Date Value Ref Range Status   04/29/2025 5.4 (L) 6.0 - 8.4 gm/dL Final     Total Protein   Date Value Ref Range Status   02/18/2025 6.0 6.0 - 8.4 g/dL Final     Lactic acid:   Lab Results   Component Value Date    LACTATE 1.6 04/21/2025    LACTATE 0.8 04/20/2025       Significant Imaging: I have reviewed all pertinent imaging results/findings within the past 24 hours.      WBC Whole Body Scan 4/25/25  Impression:     The mild diffuse increased uptake around the left knee joint likely related to infectious/ inflammatory arthritis.  No other foci of abnormal increased uptake to suggest other source of infection        I spent a total of 50 minutes on the day of the visit. This includes face to face time in discussion of goals of care, symptom assessment, coordination of care and emotional support. This also includes non-face to face time preparing to see the patient (eg, review of tests/imaging), obtaining and/or reviewing separately obtained history, documenting clinical information in the electronic or other health record, independently interpreting results and communicating results to the patient/family/caregiver, or care coordinator.       Additional 60min time spent on a voluntary advance care planning and /or goals of care discussion, providing emotional support, formulating, and communicating prognosis and exploring burden/benefit of various approaches of treatment.     Krystyna Hdz NP  Palliative Medicine  [facility]

## 2025-04-29 NOTE — PLAN OF CARE
Problem: Skin Injury Risk Increased  Goal: Skin Health and Integrity  Outcome: Progressing     Problem: Fall Injury Risk  Goal: Absence of Fall and Fall-Related Injury  Outcome: Progressing     Problem: Wound  Goal: Optimal Coping  Outcome: Progressing

## 2025-04-29 NOTE — PLAN OF CARE
Problem: Skin Injury Risk Increased  Goal: Skin Health and Integrity  Outcome: Progressing     Problem: Adult Inpatient Plan of Care  Goal: Plan of Care Review  Outcome: Progressing  Goal: Absence of Hospital-Acquired Illness or Injury  Outcome: Progressing  Goal: Optimal Comfort and Wellbeing  Outcome: Progressing     Problem: Fall Injury Risk  Goal: Absence of Fall and Fall-Related Injury  Outcome: Progressing     Problem: Infection  Goal: Absence of Infection Signs and Symptoms  Outcome: Progressing     Problem: Wound  Goal: Absence of Infection Signs and Symptoms  Outcome: Progressing  Goal: Skin Health and Integrity  Outcome: Progressing  Goal: Optimal Wound Healing  Outcome: Progressing     Problem: Delirium  Goal: Improved Sleep  Outcome: Progressing     Problem: Sepsis/Septic Shock  Goal: Absence of Infection Signs and Symptoms  Outcome: Progressing  Goal: Optimal Nutrition Intake  Outcome: Progressing     Problem: Coping Ineffective  Goal: Effective Coping  Outcome: Progressing

## 2025-04-29 NOTE — SUBJECTIVE & OBJECTIVE
Interval History: No acute events overnight. Goals of care discussion continued. Patient pivoted to comfort focused care.     Past Medical History:   Diagnosis Date    Acute biliary pancreatitis 07/27/2024    Acute pancreatitis 07/27/2024    Acute pulmonary embolism 4/21/2025    Anemia 07/12/2024    Aortic atherosclerosis     Arthritis     knee joint pain    Asthma-COPD overlap syndrome 08/22/2022    home o2    Breast cancer 2002    left breast & lymph nodes-s/p sx with chemo    Cataracts, bilateral     Chronic diastolic heart failure 12/24/2019    Chronic kidney disease, stage 3     Class 1 obesity due to excess calories with serious comorbidity and body mass index (BMI) of 30.0 to 30.9 in adult 05/16/2024    Gram-negative bacteremia - Pasturella multocida 07/12/2024    History of chemotherapy     last treatment 12/2002 (had 8 treatments)    Hyperlipidemia 11/20/2016    Hypertension     Lymphedema of both lower extremities 01/25/2022    Nephrolithiasis 06/02/2014    Osteoporosis     Paroxysmal atrial fibrillation 06/07/2021    Renal osteodystrophy 01/14/2016    Sepsis 4/19/2025    Severe protein-calorie malnutrition 4/23/2025    Shock 04/06/2025    Subclinical hypothyroidism 04/06/2025    Venous stasis dermatitis of both lower extremities 01/25/2022    Vitamin D insufficiency        Past Surgical History:   Procedure Laterality Date    ABLATION, ATRIAL FLUTTER, TYPICAL N/A 6/17/2024    Procedure: Ablation, Atrial Flutter, Typical;  Surgeon: Taz Church MD;  Location: [facility] EP LAB;  Service: Cardiology;  Laterality: N/A;  AFL, RFA, LORENE, MAKAYLA (cx if SR), anes, MB, 3 Prep    BREAST BIOPSY Left 2002    core bx, +    BREAST BIOPSY Right 2018    core    BREAST BIOPSY Right 2019    BREAST LUMPECTOMY Left 2002    CYSTOSCOPY  4/28/2022    Procedure: CYSTOSCOPY;  Surgeon: Vik Ware MD;  [facility];  Service: Urology;;    CYSTOSCOPY  5/30/2022    Procedure: CYSTOSCOPY;  Surgeon: Bonnie RECINOS  MD Eamon;  Location: [facility] OR [facility] FLR;  Service: Urology;;    ECHOCARDIOGRAM,TRANSESOPHAGEAL N/A 6/17/2024    Procedure: Transesophageal echo (MAKAYLA) intra-procedure log documentation;  Surgeon: Nestor Martinez MD;  Location: [facility] EP LAB;  Service: Cardiology;  Laterality: N/A;    ERCP N/A 7/30/2024    Procedure: ERCP (ENDOSCOPIC RETROGRADE CHOLANGIOPANCREATOGRAPHY);  Surgeon: Sierra Cotto MD;  Location: [facility] ENDO (2ND FLR);  Service: Endoscopy;  Laterality: N/A;    IMPLANTATION OF LEADLESS PACEMAKER N/A 4/10/2025    Procedure: INSERTION, CARDIAC PACEMAKER, LEADLESS;  Surgeon: CAROLINE Solis MD;  Location: [facility] EP LAB;  Service: Cardiology;  Laterality: N/A;  CHB, MICRA leadless PPM, MDT, Anes, EH, CICU [id number]    LASER LITHOTRIPSY  5/30/2022    Procedure: LITHOTRIPSY, USING LASER;  Surgeon: Bonnie Knutson MD;  [facility];  Service: Urology;;    LITHOTRIPSY      MASTECTOMY Left 06/2002    left-& lymph node dissection    REMOVAL, PACEMAKER, TEMPORARY TRANSVENOUS  4/10/2025    Procedure: Removal, Pacemaker, Temporary Transvenous;  Surgeon: CAROLINE Solis MD;  Location: [facility] EP LAB;  Service: Cardiology;;    REPLACEMENT OF STENT Right 5/30/2022    Procedure: REPLACEMENT, STENT;  Surgeon: Bonnie Knutson MD;  [address]  Service: Urology;  Laterality: Right;    RETROGRADE PYELOGRAPHY Right 4/28/2022    Procedure: PYELOGRAM, RETROGRADE;  Surgeon: Vik Ware MD;  Location: [facility] OR 1ST FLR;  Service: Urology;  Laterality: Right;    ROBOT-ASSISTED LAPAROSCOPIC PARTIAL NEPHRECTOMY USING DA JESÚS XI Right 3/11/2021    Procedure: XI ROBOTIC NEPHRECTOMY, PARTIAL;  Surgeon: Taz Ortega MD;  Location: [facility] OR 2ND FLR;  Service: Urology;  Laterality: Right;  4hr/ gen with regional  [place] confirmation:  [id number] for 11:15am case NC    URETEROSCOPIC REMOVAL OF URETERIC CALCULUS Right 5/30/2022    Procedure: REMOVAL, CALCULUS, URETER, URETEROSCOPIC;   Surgeon: Bonnie Knutson MD;  Location: [facility] OR [address] [place] [address];  Service: Urology;  Laterality: Right;    ureteroscopy Bilateral     6.14    URETEROSCOPY Right 5/30/2022    Procedure: URETEROSCOPY;  Surgeon: Bonnie Knutson MD;  Location: [facility] OR [address] [place] [address];  Service: Urology;  Laterality: Right;       Review of patient's allergies indicates:   Allergen Reactions    Adhesive tape-silicones     Adhesive Rash     Pt states she removed a LATEX bandaid and the skin beneath was swollen and red. No other latex causes a reaction.       Medications:  Continuous Infusions:  Scheduled Meds:   apixaban  5 mg Oral BID    levothyroxine  50 mcg Oral Before breakfast    LIDOcaine  1 patch Transdermal Q24H    metoprolol tartrate  12.5 mg Oral BID    scopolamine  1 patch Transdermal Q3 Days     PRN Meds:  Current Facility-Administered Medications:     acetaminophen, 650 mg, Oral, Q6H PRN    albuterol-ipratropium, 3 mL, Nebulization, Q6H PRN    HYDROmorphone, 0.5 mg, Intravenous, Q4H PRN    lorazepam, 0.5 mg, Intravenous, Q30 Min PRN    nitroGLYCERIN, 0.4 mg, Sublingual, Q5 Min PRN    ondansetron, 8 mg, Oral, Q8H PRN    polyethylene glycol, 17 g, Oral, BID PRN    senna, 8.6 mg, Oral, Daily PRN    simethicone, 1 tablet, Oral, TID PRN    Family History       Problem Relation (Age of Onset)    Arthritis Mother, Sister    Bone cancer Mother    Breast cancer Mother    Cancer Father    Crohn's disease Daughter    Fibroids Daughter    No Known Problems Brother, Daughter          Tobacco Use    Smoking status: Former     Current packs/day: 0.00     Average packs/day: 1 pack/day for 50.0 years (50.0 ttl pk-yrs)     Types: Cigarettes     Start date: 1960     Quit date: 5/20/2009     Years since quitting: 15.9    Smokeless tobacco: Former   Substance and Sexual Activity    Alcohol use: No    Drug use: No    Sexual activity: Not Currently     Partners: Male     Birth control/protection: Partner-Vasectomy       Review of Systems    Constitutional:  Positive for activity change and fatigue.   HENT:  Positive for voice change.    Respiratory:  Positive for shortness of breath.    Cardiovascular:  Positive for leg swelling.   Gastrointestinal:  Positive for nausea.   Neurological:  Positive for weakness.   Hematological:  Bruises/bleeds easily.     Objective:     Vital Signs (Most Recent):  Temp: 97.6 °F (36.4 °C) (04/29/25 1649)  Pulse: 76 (04/29/25 1649)  Resp: 16 (04/29/25 1649)  BP: 138/65 (04/29/25 1649)  SpO2: (!) 94 % (04/29/25 1649) Vital Signs (24h Range):  Temp:  [95 °F (35 °C)-98.5 °F (36.9 °C)] 97.6 °F (36.4 °C)  Pulse:  [66-80] 76  Resp:  [16-19] 16  SpO2:  [92 %-96 %] 94 %  BP: (129-162)/(62-77) 138/65     Weight: 70.5 kg (155 lb 6.8 oz)  Body mass index is 26.68 kg/m².       Physical Exam  Vitals and nursing note reviewed.   Constitutional:       General: She is not in acute distress.     Appearance: She is ill-appearing.   HENT:      Head: Normocephalic and atraumatic.      Nose: Nose normal.      Mouth/Throat:      Mouth: Mucous membranes are dry.   Eyes:      Extraocular Movements: Extraocular movements intact.   Cardiovascular:      Rate and Rhythm: Normal rate.   Pulmonary:      Effort: Pulmonary effort is normal.   Skin:     General: Skin is warm and dry.   Neurological:      Mental Status: She is alert and oriented to person, place, and time.      Motor: Weakness present.   Psychiatric:         Mood and Affect: Mood normal.         Behavior: Behavior normal.         Thought Content: Thought content normal.         Judgment: Judgment normal.            Review of Symptoms      Symptom Assessment (ESAS 0-10 Scale)  Pain:  0  Dyspnea:  0  Anxiety:  0  Nausea:  0  Depression:  0  Anorexia:  0  Fatigue:  0  Insomnia:  0  Restlessness:  0  Agitation:  0     CAM / Delirium:  Negative  Constipation:  Negative  Diarrhea:  Negative      Modified Berto Scale:  1    Performance Status:  50    Psychosocial/Cultural:   See Palliative  Psychosocial Note: Yes  - Previously , now .  - Prior to patient's hospitalizations, she was living alone. Daughter lived in neighborhood and assisted patient with cleaning and cooking  - Over the last year, patient/daughters report patient has spent more time in the hospital/rehab than at home.   - Patient states he enjoys sitting on her porch, gardening in her garden pots, and spending time with her tabby cat (male, Romeo)   - Patient's primary support system includes her 2 daughters, Sirisha and Esther.  - previously enjoyed driving, shopping at [employer], [employer], and [employer], but states it has been a while since she has been able to do all the things she enjoys  **Primary  to Follow**  Palliative Care  Consult: Yes    Spiritual:  F - Megan and Belief:  Baptism        Advance Care Planning   Advance Directives:   Living Will: Yes        Copy on chart: Yes    LaPOST: No    Do Not Resuscitate Status: Yes    Medical Power of : Yes    Agent's Name:  Esther Barajas   Agent's Contact Number:  [phone]    Decision Making:  Patient answered questions and Family answered questions  Goals of Care: The patient endorses that what is most important right now is to focus on symptom/pain control, quality of life, even if it means sacrificing a little time, and comfort and QOL     Accordingly, we have decided that the best plan to meet the patient's goals includes enrolling in hospice care and pivot to comfort-focused care           Significant Labs: All pertinent labs within the past 24 hours have been reviewed.  CBC:   Recent Labs   Lab 04/29/25 0526   WBC 6.38   HGB 11.5*   HCT 37.3   MCV 92        BMP:  Recent Labs   Lab 04/29/25 0526   GLU 88   *   K 3.8   *   CO2 29   BUN 44*   CREATININE 1.0   CALCIUM 10.8*   MG 2.2     LFT:  Lab Results   Component Value Date    AST 47 (H) 04/29/2025    ALKPHOS 107 04/29/2025    BILITOT 0.5 04/29/2025      Albumin:   Albumin   Date Value Ref Range Status   04/29/2025 2.2 (L) 3.5 - 5.2 g/dL Final   02/18/2025 2.8 (L) 3.5 - 5.2 g/dL Final     Protein:   Protein Total   Date Value Ref Range Status   04/29/2025 5.4 (L) 6.0 - 8.4 gm/dL Final     Total Protein   Date Value Ref Range Status   02/18/2025 6.0 6.0 - 8.4 g/dL Final     Lactic acid:   Lab Results   Component Value Date    LACTATE 1.6 04/21/2025    LACTATE 0.8 04/20/2025       Significant Imaging: I have reviewed all pertinent imaging results/findings within the past 24 hours.      WBC Whole Body Scan 4/25/25  Impression:     The mild diffuse increased uptake around the left knee joint likely related to infectious/ inflammatory arthritis.  No other foci of abnormal increased uptake to suggest other source of infection

## 2025-04-29 NOTE — PROGRESS NOTES
[facility] - Cardiology [facility] Medicine  Progress Note    Patient Name: Misa Barajas  MRN: [id number]  Patient Class: IP- Inpatient   Admission Date: 4/6/2025  Length of Stay: 23 days  Attending Physician: Uche Antunez MD  Primary Care Provider: Isela Langston MD        Subjective     Principal Problem:Palliative care encounter        HPI:  Ms. Misa Barajas is a 78 yo female with COPD on 2L home O2, HFpEF, AFL s/p RFA (6/17/24 w/ Dr. Church), CKD III, HTN, HLD, chronic venous insufficiency, orthostatic hypotension who was brought via EMS after per history she was found confused and bradycardiac with HR in the 20s.They started epi and transcutaneous pacing. Given Versed for the transcutaneous pacing. They had to bag her in route. We do not have strip of underlying rhythm. Patient obtunded for which she was intubated for airway protection on arrival to the ED and transcutaneous pacing was exchanged to transvenous pacing via RIJ (threshold 0.6). Underlying rate 44 bifascicular block. Known RBBB. Per daughters patient had called them over the phone when they noticed she appeared confused. Due to concerns for a possible syncopal episode EMS was called. Prior to event patient had been complaining of malaise and nausea for which she took about two Zofran pills per the daughters.      She had been recently seen on 3/27/25 at the [facility] visit following recent admission for AHRF 2/2 ADHF and COPD exacerbation. On ED presentation BNP elevated to 2500, troponin elevated to 19, CXR with pulmonary edema. She was started on steroids, nebs and antibiotics in addition to IV diuresis. Updated TTE stable from prior, showing HFpEF with elevated CVP 15. Her O2 requirements improved to baseline with diuresis and she was subsequently discharged. At that visit patient had not required midodrine as blood pressure had remained stable and she was asymptomatic.      Off note, patient was taken off OAC due to bleeding issues  "and no recurrent of typical atrial flutter after ablation.     Overview/Hospital Course:  Admitted to CCU for CHB in s/o hyperkalemia & in the presence of conduction disease/RBBB & LAHB - stable w/ temporary wire. ??Septic shock deemed to be d/t UTI - ?? UA with only 7 WBCs); vasopressors weaned off. HTN; started NG gtt. Acute on chronic hypoxic/hypercapnic RF & obtunded; intubated. Successfully extubated 04/08. MARQUEZ on CKD; improved w/ good UOP response to diuresis. PVCs w/ intermittent pacing by TVP. Switched pip-tazo to amox-clav on 04/09; continued to improve; empiric EED 04/13 for 7 day course. Micra PM by EP placed 04/10. Medically stable for stepdown. Patient was extremely short of breath with physical therapy, chest x-ray concerning for pulmonary edema.  Started patient on IV diuresis, now euvolemic.  ENT was consulted yesterday due to hoarseness of voice.  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 Pt uninterested in any procedure at this time, ENT recommended SLP follow up.  Able to tolerate regular diet at this point.  Now agreeable for SNF, working on placement. Course c/b worsening hypoxia & hyperNa.      Overnight 4/18-4/19, decompensated with recurrent sepsis, hypothermia 92, possible PNA with infiltrates on CXR, Zosyn IV started. Remained hypothermic despite Zosyn x 2 doses and Manny hugger in place (pt uncomfortable/hot and asking for removal), so added Vanc IV STAT. Checked TSH and FT4-  wnl. UA with pyuria however not a clean catch w 33 sq epi, so repeated UA. C/o "stomach" pain, overall picture with the "stomach" pain and septic picture is "exactly what led us to ICU 2 weeks ago" per daughter. +TTP RUQ, RLQ, LLQ.   Poor UOP, only 200cc by mid-shift, and still hyperNa with FWD 1.8L so started gentle hydration with D5W @ 75cc for 24 hrs. May need to diurese again after sepsis resolves. CT C/A/P noncon with pulm edema, B pleural effusions, possible PNA, no " intra-abdominal process seen.     On 4/21, Mental status much improved, however still with mild confusion and feels very tired.   decompensated further with recurrent MARQUEZ, C/o new SOB and hypoxemic 90% on 4L (over her baseline of 2L at home), improved to >94% on 6L with improvement of SOB.  C/o new L chest pressure/pain 4/10 (non-radiating), c/f MI or PE, resolved after nitro SL x 1 however HypoT with SBP upper 80s so will avoid further nitro.  HS trop 38 (decreased from prior), BNP 700s (increased from prior).CXR with pulm edema, all most c/w ADHF- Lasix 40 IV x 1 given.    Concern for untreated sepsis with ongoing hypothermia requiring jeffrey hugger despite Vanc/Zosyn for over 24 hours, so broadened to Vanc/Sher/Dinah, consulted ID, repeated BCx x 2. Lactate wnl.  Re: concern for PE, Known LIJ DVT, apixaban x several days then stopped (likely for hemorrhage of vocal fold)- discussed w ENT 4/20, ok to resume anticoag if needed. PE workup- F/u STAT UE and LE Dopplers. Unable to check CTA chest given MARQUEZ, unable to check V/Q given pulm edema. Anticoagulation with Apixaban.  Hypothermia returned however pt looks and feels the best she has in 4 days.  Voice is getting stronger.  Sitting in chair.  UCx resulted with ESBL Klebsiella, so Sher has been good coverage.  ID changed to Dapto (from Vanc), continuing Sher, for coverage of UTI and well as PNA.  BCx have all been negative.  Continuing Eliquis for DVt LUE and presumed PE.  Acute hypox resp failure 2/2 ADHF and presumed PE, still on 6L, dosing Lasix IV PRN daily given yet-unclear status of her sepsis and avoiding volume depletion in sepsis.      Prior to admit, was independent in her residence with her cat.  Two daughters very supportive.        Interval History:  Meeting held with palliative Care, decision for hospice, figuring out dispo plans at this time    Review of Systems   Unable to perform ROS: Other     Objective:     Vital Signs (Most Recent):  Temp: 97.5 °F  (36.4 °C) (04/29/25 1119)  Pulse: 70 (04/29/25 1119)  Resp: 19 (04/29/25 1119)  BP: 129/62 (04/29/25 1119)  SpO2: (!) 93 % (04/29/25 1119) Vital Signs (24h Range):  Temp:  [95 °F (35 °C)-98.5 °F (36.9 °C)] 97.5 °F (36.4 °C)  Pulse:  [66-80] 70  Resp:  [18-19] 19  SpO2:  [92 %-96 %] 93 %  BP: (129-162)/(62-77) 129/62     Weight: 70.5 kg (155 lb 6.8 oz)  Body mass index is 26.68 kg/m².    Intake/Output Summary (Last 24 hours) at 4/29/2025 1319  Last data filed at 4/29/2025 1309  Gross per 24 hour   Intake 50 ml   Output 450 ml   Net -400 ml              Physical Exam  Constitutional:       Appearance: She is ill-appearing.   HENT:      Head: Normocephalic.      Right Ear: External ear normal.      Left Ear: External ear normal.      Nose: Nose normal.      Comments: Nasal cannula  Cardiovascular:      Rate and Rhythm: Normal rate.      Heart sounds: Normal heart sounds.   Pulmonary:      Breath sounds: Normal breath sounds.   Abdominal:      Palpations: Abdomen is soft.      Tenderness: There is no abdominal tenderness.   Musculoskeletal:      Right lower leg: No edema.      Left lower leg: No edema.   Skin:     Findings: Bruising present.   Neurological:      Mental Status: She is alert. Mental status is at baseline.              Significant Labs: All pertinent labs within the past 24 hours have been reviewed.        Assessment & Plan  Complete heart block  RBBB  Shock  -Known RBBB. Admitted with CHB and shock.  Bradycardic with HR in the 20s on arrival, transcutaneous pacing/epi initiated, admitted to CCU  - bradycardia most likely in the setting of electrolyte disturbance and abnormal thyroid function  Plan:  -s/p micra PM placement 4/10 by EP  -correct electrolyte derangements  Sepsis  Hypothermia  Cont Sher/Dapto for now  - f/u NM tagged WBC scan whole body   - f/u BCx and UCx  - f/u ID recs    4/24  Follow up nuclear studies.  Palliative Care on board.  Follow up with Infectious Disease  Cont Sher/Dapto for  now  - f/u NM tagged WBC scan whole body   - f/u BCx and UCx  - f/u ID recs    4/26  Nuclear studies report and ID input noted.  To complete antibiotics tomorrow  Cont Sher/Dapto for now  - f/u NM tagged WBC scan whole body   - f/u BCx and UCx  - f/u ID recs    4/24  Follow up nuclear studies.  Palliative Care on board.  Follow up with Infectious Disease  Cont Sher/Dapto for now  - f/u NM tagged WBC scan whole body   - f/u BCx and UCx  - f/u ID recs  Acute pulmonary embolism  Left chest pressure  Acute deep vein thrombosis (DVT) of left upper extremity  PRESUMED PE (unable to do imaging at this time as risk > benefit)  CONFIRMED LUE DVT  - Apixaban 10 BID x 7 days then 5 BID  - TTE with mild R heart strain  Hypernatremia  Very slowly uptrending Na since admission. Peak 150 on 4/17 for which pt received 250cc LR. Na 149 the following day & pt w/ increased O2 needs & LE edema. Unclear etiology as pt does not appear hypovolemic.   - encourage water PO  - Consider Nephrology consult  - Monitor BMP Q12-24H     4/24  Resolved  Dysphonia  Paralysis of right vocal cord  -Persistent hoarseness since extubation on 4/8  -ENT consulted, status post laryngoscopy 4/15 which noted right TDC paralysis with mild hemorrhage of true cord, adequate glottic closure noted  -Pt uninterested in VC augmentation at this time, needs outpatient SLP follow up  Typical atrial flutter  -s/p ablation 6/2024 without recurrence  -AC discontinued by outpatient EP provider  -Cont BB  - of note, on Eliquis for VTE (not for Afib)  CKD (chronic kidney disease) stage 3, GFR 30-59 ml/min  Creatine stable for now. BMP reviewed- noted Estimated Creatinine Clearance: 42.5 mL/min (based on SCr of 1 mg/dL). according to latest data. Based on current GFR, CKD stage is stage 4 - GFR 15-29.  Monitor UOP and serial BMP and adjust therapy as needed. Renally dose meds. Avoid nephrotoxic medications and procedures.    Centrilobular emphysema  Prior history of smoking.  "Continue scheduled inhalers once extubated. Antibiotics and Supplemental oxygen and monitor respiratory status closely.     Chronic respiratory failure with hypoxia  Patient with Hypoxic Respiratory failure which is Chronic.  she is on home oxygen at 2 LPM. Supplemental oxygen was provided and noted-       Signs/symptoms of respiratory failure include- tachypnea, increased work of breathing, use of accessory muscles, and lethargy. Contributing diagnoses includes - underlying COPD   -transition to home bumex    Acute on chronic congestive heart failure  Most recent BNP and echo results are listed below.  No results for input(s): "BNP" in the last 72 hours.     Echo  Result Date: 4/21/2025    Left Ventricle: The left ventricle is normal in size. Normal wall   thickness. There is normal systolic function with a visually estimated   ejection fraction of 55 - 60%.    Right Ventricle: The right ventricle has mild enlargement. Systolic   function is mildly reduced.    Aortic Valve: There is moderate aortic valve sclerosis. There is mild   annular calcification present. Mildly restricted motion.    Mitral Valve: There is severe mitral annular calcification.    Tricuspid Valve: There is mild regurgitation.    Pulmonary Artery: There is moderate pulmonary hypertension. The   estimated pulmonary artery systolic pressure is 51 mmHg.    IVC/SVC: Elevated venous pressure at 15 mmHg.    Left pleural effusion.      Current Heart Failure Medications: have been held due to bradycardia and MARQUEZ     Plan  - Lasix 80 IV x 1, re-dose daily after clinical eval, slow diuresis while still actively in sepsis  - HOLD home hydralazine for same reason  - cont home metop for rate control in afib  - Monitor strict I&Os and daily weights.  - Monitor electrolytes, replete PRN for goal K > 4 & Mg > 2  - Telemetry    Orthostatic hypotension  holding midodrine and per history patient had not required it since last hospitalization     Iron deficiency " "anemia due to chronic blood loss  Anemia is likely due to chronic disease due to Chronic Kidney Disease. Most recent hemoglobin and hematocrit are listed below.  Recent Labs     04/27/25  0541 04/28/25  0457 04/29/25  0526   HGB 12.0 11.2* 11.5*   HCT 38.7 35.8* 37.3     Plan  - Monitor serial CBC: Daily  - Transfuse PRBC if patient becomes hemodynamically unstable, symptomatic or H/H drops below 7/21.  - Patient has not received any PRBC transfusions to date  - Patient's anemia is currently stable    Subclinical hypothyroidism  Per Endocrine consult, "Mild, subclinical hypothyroidism - unlikely to be the cause of shock or mental status changes especially given normal fT4. Can start low dose LT4 per OGT, but in cardiac pts and elderly, better to under treat rather than over-treat -- therefore conservative/low dose LT4 recommended. Needs TSH repeated outpatient in 4wks."    Left knee pain  Appreciate Ortho consult 4/21  Cont Lido patch    Cystitis      Pleural effusion      Anticoagulated      Pulmonary hypertension      Other reduced mobility      Severe protein-calorie malnutrition  Interventions/Recommendations (treatment strategy):  1. Continue low Na diet as tolerated    - please continue to document PO % intake via flowsheets   2. Continue boost plus TID 3. Encourage good intake  4. RD to monitor weight, labs, intake, tolerance    Palliative care encounter  Complex medical history with overall poor prognosis.  Significant debility.  Notable pleural effusions ?  Contributing to some degree of shortness of breath and chest discomfort.  Pan CT done recently no reports of intra-abdominal pathology to account for abdominal pain.  Intermittent hypernatremia ?  Poor oral intake.  Difficulties with fluid status management.  Recommending palliative care at this time, however family to decide whether or not they want to do skilled nursing facility or pursue palliative care/hospice.    4/28  Decision made for " "hospice/palliative care. F/u with them    Comfort measures only status      VTE Risk Mitigation (From admission, onward)           Ordered     apixaban tablet 5 mg  2 times daily        Placed in "Followed by" Linked Group    04/20/25 1900     IP VTE HIGH RISK PATIENT  Once         04/06/25 0512                    Discharge Planning   NOHEMI: 4/30/2025     Code Status: DNR   Medical Readiness for Discharge Date:   Discharge Plan A: Skilled Nursing Facility   Discharge Delays: (!) Payor Issues                    Uche Antunez MD  Department of Hospital Medicine   [facility] - Cardiology Stepdown    "

## 2025-04-29 NOTE — ASSESSMENT & PLAN NOTE
Creatine stable for now. BMP reviewed- noted Estimated Creatinine Clearance: 42.5 mL/min (based on SCr of 1 mg/dL). according to latest data. Based on current GFR, CKD stage is stage 4 - GFR 15-29.  Monitor UOP and serial BMP and adjust therapy as needed. Renally dose meds. Avoid nephrotoxic medications and procedures.

## 2025-04-29 NOTE — PLAN OF CARE
[facility] - Cardiology Stepdown  Discharge Reassessment    Primary Care Provider: Isela Langston MD    Expected Discharge Date: 5/2/2025    Reassessment (most recent)       Discharge Reassessment - 04/29/25 1614          Discharge Reassessment    Assessment Type Discharge Planning Reassessment     Did the patient's condition or plan change since previous assessment? Yes     Discharge Plan discussed with: Adult children     Communicated NOHEMI with patient/caregiver Date not available/Unable to determine     Discharge Plan A Inpatient Hospice     Discharge Plan B New Nursing Home placement - nursing home care facility;Hospice/home     DME Needed Upon Discharge  none     Transition of Care Barriers None     Why the patient remains in the hospital Requires continued medical care                   SW informed by Krystyna with Palliative Care after family meeting of plan for pt to transfer to PCU while pt's daughters continue to discuss about nursing home with hospice.  MARINE called pt's daughter Esther who confirmed the above, and discussed that nursing home with hospice would put financial liability on the family for the nursing home, unlike SNF.  Esther stated that she and her sister would need to discuss the facility options and the financial responsibility further prior to agreeing to transfer pt to another facility.  MARINE emailed list of nursing homes to [email or web address] per Esther's request.    Discharge Plan A and Plan B have been determined by review of patient's clinical status, future medical and therapeutic needs, and coverage/benefits for post-acute care in coordination with multidisciplinary team members.  Will continue to follow.      Karey Castelan LMSW  [facility]  [phone]

## 2025-04-30 NOTE — PLAN OF CARE
Problem: Skin Injury Risk Increased  Goal: Skin Health and Integrity  Outcome: Progressing  Intervention: Optimize Skin Protection  Flowsheets (Taken 4/30/2025 1654)  Pressure Reduction Techniques:   heels elevated off bed   pressure points protected   weight shift assistance provided  Pressure Reduction Devices:   foam padding utilized   positioning supports utilized   pressure-redistributing mattress utilized  Skin Protection:   incontinence pads utilized   silicone foam dressing in place  Activity Management: Rolling - L1  Head of Bed (HOB) Positioning: HOB at 20-30 degrees     Problem: Adult Inpatient Plan of Care  Goal: Plan of Care Review  Outcome: Progressing  Flowsheets (Taken 4/30/2025 1654)  Plan of Care Reviewed With:   patient   family  Goal: Absence of Hospital-Acquired Illness or Injury  Outcome: Progressing  Intervention: Identify and Manage Fall Risk  Flowsheets (Taken 4/30/2025 1654)  Safety Promotion/Fall Prevention:   assistive device/personal item within reach   Fall Risk reviewed with patient/family   high risk medications identified   medications reviewed   patient expresses understanding of fall risk and prevention   side rails raised x 3  Intervention: Prevent Skin Injury  Flowsheets (Taken 4/30/2025 1654)  Body Position: turned  Skin Protection:   incontinence pads utilized   silicone foam dressing in place  Device Skin Pressure Protection:   absorbent pad utilized/changed   positioning supports utilized   pressure points protected   tubing/devices free from skin contact  Intervention: Prevent Infection  Flowsheets (Taken 4/30/2025 1654)  Infection Prevention:   environmental surveillance performed   equipment surfaces disinfected   hand hygiene promoted   single patient room provided  Goal: Optimal Comfort and Wellbeing  Outcome: Progressing  Intervention: Monitor Pain and Promote Comfort  Flowsheets (Taken 4/30/2025 1654)  Pain Management Interventions:   medication offered   quiet  environment facilitated     Problem: Fall Injury Risk  Goal: Absence of Fall and Fall-Related Injury  Outcome: Progressing  Intervention: Identify and Manage Contributors  Flowsheets (Taken 4/30/2025 1654)  Medication Review/Management:   medications reviewed   high-risk medications identified     Problem: Palliative Care  Goal: Enhanced Quality of Life  Outcome: Progressing  Intervention: Maximize Comfort  Flowsheets (Taken 4/30/2025 1654)  Pain Management Interventions:   medication offered   quiet environment facilitated  Intervention: Optimize Psychosocial Wellbeing  Flowsheets (Taken 4/30/2025 1654)  Family/Support System Care:   presence promoted   self-care encouraged

## 2025-04-30 NOTE — ASSESSMENT & PLAN NOTE
Impression:  Misa Barajas is a 79 y.o. female with PMHx of COPD on 2L home O2, HFpEF, AFL s/p RFA (6/17/24 w/ Dr. Church), CKD III, HTN, HLD, chronic venous insufficiency, orthostatic hypotension who was brought via EMS after per history she was found confused and bradycardiac with HR in the 20s. Patient was percutaneously paced. During hospitalization, was intubated then extubated with residual hoarseness of voice (ENT following), required intermittent pacing by TVP, a Micra PM by EP placed 04/10, aggressive diuresing, recurrent sepsis and hypothermia, patient has required several trials of antibiotics, anticipating WBC scan on 4/25 (ID following), and presumed PE (unable to verify with CTA secondary to MARQUEZ).      Of note, patient has had 7x ED visits in the last year. Patient has engaged in goals of care discussions with Dr. Velasquez of Palliative Medicine for goals of care and symptom management. Palliative Medicine was consulted by primary, Dr. Antunez, for goals of care and advanced care planning discussions.    Patient is currently resting in bed, AAOx4, daughters (Sirisha and Esther) at bedside. Patient and family are amenable to Pal [facility]. Pal [facility] APRN and LCSW at bedside for goals of care discussions.     Transferred to [facility] on 4/29 after transitioning to comfort focused care.    - Prior experience with serious illness: yes  -The patient has previously engaged in advance care planning or GOC discussions  - Insight/Understanding of illness: yes      Advance Care Planning     Date: 04/29/2025  - Family meeting at bedside with Esther Grimm, Dr. Antunez, and HAFSA Hdz DNP.   - Philosophies of hospice reviewed.   - Patient and family amenable to pivoting to comfort-focus care and transferring to [facility].  - Case discussed with [facility] Attending, Dr. Mayo and case management team.   - Transfer orders placed. Elyssa notified via telephone.     Date: 04/28/2025  - Patient resting in bed, 4L O2, jeffrey hugger warming  "blanket in place, no acute distress upon my visit. Patient's daughters arrive at bedside shortly after.   - Daughters requested we meet outside of room so that we do not disturb patient's rest. I met with patient's daughter in 3rd floor Visitor's [facility].  - We discussed, patient's recent hospital encounters, current hospitalization, and patient's progressive health decline.   - Daughters acknowledge patient's journey in/out of hospital for past year has been tiring and exhausting and are interested in exploring hospice options. Sirisha states she has been trying to juggle working and caring for her mom after her work day, but is limited in the care that she gives her mom because she has no past medical background. Esther states she has been fortunate to be able to work from home to be with her mom more often. Both daughters acknowledged the emotional strain caring for their mom has caused. Daughters state when something "goes wrong", they try to do as much as they can (limited to checking a blood pressure, O2 sats, and giving a dose of zofran). Once they have exhausted those interventions, they call 911 because they know they are "in over their heads" to get additional help. Patient is then admitted to hospital for additional workup. Sirisha states that it has been so traumatizing when she sees her mom is sick, tries to do everything she can to care for her mom, notices it is not working, and hears her mom begging for help, but refuses to go to the hospital. This has created immense stress on the daughters. Patient never wants to go to the hospital, but eventually agrees to come to ED because either daughters convince her or because patient becomes too sick to independently make a decision. Sirisha states she is worried that she is "giving up" on her mom even though patient has been "fighting" for so long. Philosophies of hospice introduced and discussed. Esther states she has been caring for and thinking about how her " "mom has progressively declined over the last year and expresses desires to prioritize patient's comfort knowing that with comfort being the priority, life will be limited. Daughters acknowledged patient wants to go home, but state they do not have the resources to continue to care for the patient at home. Daughters inquired about inpatient hospice unit, discussed in-patient hospice criteria. During conversation, daughters became appropriately tearful. Emotional support provided. Daughters asked for additional time to process steps forward and where the patient would continued to be cared for.   - After speaking with the daughters, I rounded on the patient again. Patient was awake and amenable to Pal [facility]. Patient acknowledges her health decline and states it has been taxing and tiring as she has had multiple hospitalization, including the current encounter. Patient agrees that her comfort has not been prioritized over the last year and if open to talking more about hospice care. Philosophies of hospice were introduced and discussed. Patient is accepting, and states she would like to go home. I informed patient that daughters expressed concerns being able to care for patient and would like to talk more about facility placement/in-patient hospice unit. Patient expressed that she was overwhelmed with the conversation and describes it as "a lot." Patient acknowledges she was not expecting to be having this [hospice] conversation so soon, but understands that we need to have it because she has not had much quality of life lately. Patient states she would like additional time to talk with her daughters.   - Tentative family meeting 4/29/30 at 0900. Primary team, Dr. Antunez, updated via secure chat.    Date: 04/25/2025  - No acute events overnight. Patient anticipates NM Inflammatory Whole Body scan today.  - Revisited goals of care discussion from yesterday. Patient acknowledged it was a difficult conversation to have, but " has been open to it. As we anticipate more information from the pending studies, goals of care discussions are also pending. Patient expressed concerns regarding nuclear scan and when it will be getting done. Patient states she thought it would have been done by now. I followed up with radiology to inquire about scan time, and updated patient on the 1-3pm time frame that radiology hopes to see patient. Patient's daughter Esther was at bedside and updated.   - Patient with no needs at this time.  - Primary team updated.        Date: 04/24/2025    Code Status  In light of the patients advanced and life limiting illness, I engaged the patient and family in a voluntary conversation about the patient's preferences for care  at the very end of life. The patient wishes to have a natural, peaceful death.  Along those lines, the patient does not wish to have CPR or other invasive treatments performed when her heart and/or breathing stops. I communicated to the patient and family that a DNR order would be placed in her medical record to reflect this preference.        [facility]  I engaged the patient and family in a voluntary conversation about advance care planning and we specifically addressed what the goals of care would be moving forward, in light of the patient's change in clinical status, specifically recurrent hospitalizations over past year, hypothermia, acute renal failure, CHF exacerbation, and current hosptialization.  We did specifically address the patient's likely prognosis, which  appears to be poor .  We explored the patient's values and preferences for future care.  The patient and family endorses that what is most important right now is to focus on spending time at home, avoiding the hospital, quality of life, even if it means sacrificing a little time, and allowing additional time for infectious workup    Accordingly, we have decided that the best plan to meet the patient's goals includes continuing with  treatment and continuing goals of care discussions pending infectious workup               Life Limiting Diagnosis  Acute on Chronic CHF  Acute Renal Failure on CKD stage 3b  Complete Heart Block    Symptom Management  - Pain: no  - acetaminophen 650mg q6h PRN    - Dyspnea: no  - on 2L at home  - maintained on 4L O2    - Constipation: no  - senna-docusate daily PRN  - poluethylene glycol daily PRN    - Nausea: no  - ondansetron 8mg q8h    - Debility: yes  - Functional status: fair.  Pt was not able to manage ADLs  - Fall precautions  - PT/OT recommending moderate intensity therapy          Plan:   - Code status: DNR  - Disposition: working for NH with hospice; family exploring options  - comfort focused care plan

## 2025-04-30 NOTE — PLAN OF CARE
Problem: Adult Inpatient Plan of Care  Goal: Plan of Care Review  Outcome: Progressing  Goal: Patient-Specific Goal (Individualized)  Outcome: Progressing  Goal: Absence of Hospital-Acquired Illness or Injury  Outcome: Progressing     Problem: Fall Injury Risk  Goal: Absence of Fall and Fall-Related Injury  Outcome: Progressing     Problem: Coping Ineffective  Goal: Effective Coping  Outcome: Progressing     Problem: Palliative Care  Goal: Enhanced Quality of Life  Outcome: Progressing      Please advise in 's absence.  MP/MA

## 2025-04-30 NOTE — NURSING
0740 - Report received. Care assumed. Pt drowsy, arousable to voice. Attempted to discuss plan of care but pt falling asleep. Will continue to assess.    0830 - Pt set up for breakfast. Able to feed self.    1100 - Eyes closed. Resp even and unlabored.    1600 - Daughter at bedside.

## 2025-04-30 NOTE — DISCHARGE SUMMARY
[facility] and Palliative Medicine  [facility]  Discharge Summary      Patient Name: Misa Barajas  MRN: [id number]  Admission Date: 4/6/2025  Hospital Length of Stay: 24 days  Discharge Date and Time: 04/30/2025 8:13 AM  Attending Physician: Aquilino Mayo MD   Discharging Provider: Uche Antunez MD  Discharge Provider Team: [facility]   Primary Care Provider: Isela Langston MD        HPI: Ms. Misa Barajas is a 80 yo female with COPD on 2L home O2, HFpEF, AFL s/p RFA (6/17/24 w/ Dr. Church), CKD III, HTN, HLD, chronic venous insufficiency, orthostatic hypotension who was brought via EMS after per history she was found confused and bradycardiac with HR in the 20s.They started epi and transcutaneous pacing. Given Versed for the transcutaneous pacing. They had to bag her in route. We do not have strip of underlying rhythm. Patient obtunded for which she was intubated for airway protection on arrival to the ED and transcutaneous pacing was exchanged to transvenous pacing via RIJ (threshold 0.6). Underlying rate 44 bifascicular block. Known RBBB. Per daughters patient had called them over the phone when they noticed she appeared confused. Due to concerns for a possible syncopal episode EMS was called. Prior to event patient had been complaining of malaise and nausea for which she took about two Zofran pills per the daughters.      She had been recently seen on 3/27/25 at the [facility] visit following recent admission for AHRF 2/2 ADHF and COPD exacerbation. On ED presentation BNP elevated to 2500, troponin elevated to 19, CXR with pulmonary edema. She was started on steroids, nebs and antibiotics in addition to IV diuresis. Updated TTE stable from prior, showing HFpEF with elevated CVP 15. Her O2 requirements improved to baseline with diuresis and she was subsequently discharged. At that visit patient had not required midodrine as blood pressure had remained stable and she was asymptomatic.     "  Off note, patient was taken off OAC due to bleeding issues and no recurrent of typical atrial flutter after ablation.     Procedure(s) (LRB):  INSERTION, CARDIAC PACEMAKER, LEADLESS (N/A)  Removal, Pacemaker, Temporary Transvenous      Hospital Course: Admitted to CCU for CHB in s/o hyperkalemia & in the presence of conduction disease/RBBB & LAHB - stable w/ temporary wire. ??Septic shock deemed to be d/t UTI - ?? UA with only 7 WBCs); vasopressors weaned off. HTN; started NG gtt. Acute on chronic hypoxic/hypercapnic RF & obtunded; intubated. Successfully extubated 04/08. MARQUEZ on CKD; improved w/ good UOP response to diuresis. PVCs w/ intermittent pacing by TVP. Switched pip-tazo to amox-clav on 04/09; continued to improve; empiric EED 04/13 for 7 day course. Micra PM by EP placed 04/10. Medically stable for stepdown. Patient was extremely short of breath with physical therapy, chest x-ray concerning for pulmonary edema.  Started patient on IV diuresis, now euvolemic.  ENT was consulted yesterday due to hoarseness of voice.  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 Pt uninterested in any procedure at this time, ENT recommended SLP follow up.  Able to tolerate regular diet at this point.  Now agreeable for SNF, working on placement. Course c/b worsening hypoxia & hyperNa.       Overnight 4/18-4/19, decompensated with recurrent sepsis, hypothermia 92, possible PNA with infiltrates on CXR, Zosyn IV started. Remained hypothermic despite Zosyn x 2 doses and Manny hugger in place (pt uncomfortable/hot and asking for removal), so added Vanc IV STAT. Checked TSH and FT4-  wnl. UA with pyuria however not a clean catch w 33 sq epi, so repeated UA. C/o "stomach" pain, overall picture with the "stomach" pain and septic picture is "exactly what led us to ICU 2 weeks ago" per daughter. +TTP RUQ, RLQ, LLQ.   Poor UOP, only 200cc by mid-shift, and still hyperNa with FWD 1.8L so started " gentle hydration with D5W @ 75cc for 24 hrs. May need to diurese again after sepsis resolves. CT C/A/P noncon with pulm edema, B pleural effusions, possible PNA, no intra-abdominal process seen.      On 4/21, Mental status much improved, however still with mild confusion and feels very tired.   decompensated further with recurrent MARQUEZ, C/o new SOB and hypoxemic 90% on 4L (over her baseline of 2L at home), improved to >94% on 6L with improvement of SOB.  C/o new L chest pressure/pain 4/10 (non-radiating), c/f MI or PE, resolved after nitro SL x 1 however HypoT with SBP upper 80s so will avoid further nitro.  HS trop 38 (decreased from prior), BNP 700s (increased from prior).CXR with pulm edema, all most c/w ADHF- Lasix 40 IV x 1 given.    Concern for untreated sepsis with ongoing hypothermia requiring jeffrey hugger despite Vanc/Zosyn for over 24 hours, so broadened to Vanc/Sher/Dinah, consulted ID, repeated BCx x 2. Lactate wnl.  Re: concern for PE, Known LIJ DVT, apixaban x several days then stopped (likely for hemorrhage of vocal fold)- discussed w ENT 4/20, ok to resume anticoag if needed. PE workup- F/u STAT UE and LE Dopplers. Unable to check CTA chest given MARQUEZ, unable to check V/Q given pulm edema. Anticoagulation with Apixaban.  Hypothermia returned however pt looks and feels the best she has in 4 days.  Voice is getting stronger.  Sitting in chair.  UCx resulted with ESBL Klebsiella, so Sher has been good coverage.  ID changed to Dapto (from Vanc), continuing Sher, for coverage of UTI and well as PNA.  BCx have all been negative.  Continuing Eliquis for DVt LUE and presumed PE.  Acute hypox resp failure 2/2 ADHF and presumed PE, still on 6L, dosing Lasix IV PRN daily given yet-unclear status of her sepsis and avoiding volume depletion in sepsis.     Patient condition worsened.  With Complex medical history with overall poor prognosis. Significant debility. Notable pleural effusions ? Contributing to some degree  of shortness of breath and chest discomfort. Worsening hypernatremia and Poor oral intake. Difficulties with fluid status management. Family decided on palliative. She was admitted to Inpatient palliative under Dr Mayo service.    Physical Exam  Constitutional:       Appearance: She is ill-appearing.   HENT:      Head: Normocephalic.      Right Ear: External ear normal.      Left Ear: External ear normal.      Nose: Nose normal.      Comments: Nasal cannula  Cardiovascular:      Rate and Rhythm: Normal rate.      Heart sounds: Normal heart sounds.   Pulmonary:      Breath sounds: Normal breath sounds.   Abdominal:      Palpations: Abdomen is soft.      Tenderness: There is no abdominal tenderness.   Musculoskeletal:      Right lower leg: No edema.      Left lower leg: No edema.   Skin:     Findings: Bruising present.   Neurological:      Mental Status: She is alert. Mental status is at baseline.            Consults:   Consults (From admission, onward)          Status Ordering Provider     Inpatient consult to Palliative Care  Once        Provider:  (Not yet assigned)    Completed AXEL RAMIREZ     Inpatient consult to Orthopedics  Once        Provider:  (Not yet assigned)    Completed AXEL RAMIREZ     Inpatient consult to Infectious Diseases  Once        Provider:  (Not yet assigned)    Completed AXEL RAMIREZ     Inpatient consult to PICC team ([facility])  Once        Provider:  (Not yet assigned)    Completed AXEL RAMIREZ     Inpatient consult to Midline team  Once        Provider:  (Not yet assigned)    Completed AXEL RAMIREZ     Inpatient consult to ENT  Once        Provider:  (Not yet assigned)    Completed DALILA FAIRBANKS     Inpatient consult to Electrophysiology  Once        Provider:  (Not yet assigned)    Completed MARLENE JACKSON     Inpatient consult to Endocrinology  Once        Provider:  (Not yet assigned)    Completed JOE GEORGES     Inpatient consult to Nephrology  Once       "  Provider:  (Not yet assigned)    Completed JOE GEORGES     Pharmacy to dose Vancomycin consult  Once        Provider:  (Not yet assigned)   Placed in "And" Linked Group    Completed JOE GEORGES            Final Active Diagnoses:    Diagnosis Date Noted POA    PRINCIPAL PROBLEM:  Palliative care encounter [Z51.5] 04/24/2025 Not Applicable    Comfort measures only status [Z51.5] 04/29/2025 Not Applicable    Other reduced mobility [Z74.09] 04/23/2025 Yes    Severe protein-calorie malnutrition [E43] 04/23/2025 Yes    Cystitis [N30.90] 04/22/2025 Yes    Left knee pain [M25.562] 04/21/2025 No    Hypothermia [T68.XXXA] 04/21/2025 No    Acute pulmonary embolism [I26.99] 04/21/2025 No    Acute deep vein thrombosis (DVT) of left upper extremity [I82.622] 04/21/2025 No    Left chest pressure [R07.89] 04/20/2025 No    Sepsis [A41.9] 04/19/2025 Yes    Hypernatremia [E87.0] 04/18/2025 No    Dysphonia [R49.0] 04/15/2025 No    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] 04/15/2025 No    Shock [R57.9] 04/06/2025 Yes    Subclinical hypothyroidism [E03.8] 04/06/2025 Yes    Complete heart block [I44.2] 04/06/2025 Yes    Pulmonary hypertension [I27.20] 03/18/2025 Yes    Iron deficiency anemia due to chronic blood loss [D50.0] 12/17/2024 Yes    Typical atrial flutter [I48.3] 09/24/2024 Yes    Orthostatic hypotension [I95.1] 08/26/2024 Yes    Acute on chronic congestive heart failure [I50.9] 05/30/2024 Yes    Anticoagulated [Z79.01] 05/04/2022 Not Applicable    Chronic respiratory failure with hypoxia [J96.11] 06/26/2021 Yes     Chronic    Pleural effusion [J90] 06/25/2021 Yes    Centrilobular emphysema [J43.2] 06/22/2021 Yes    CKD (chronic kidney disease) stage 3, GFR 30-59 ml/min [N18.30] 02/14/2018 Yes     Chronic    RBBB [I45.10] 05/17/2017 Yes     Chronic      Problems Resolved During this Admission:    Diagnosis Date Noted Date Resolved POA    MARQUEZ (acute kidney injury) [N17.9] 05/02/2022 04/17/2025 Yes    "   Discharged Condition: poor    Disposition: Hospice/Medical Facility    Follow Up:    Patient Instructions:   No discharge procedures on file.  Medications:  Reconciled Home Medications:      Medication List        CONTINUE taking these medications      PRATIMA POLANCOPHERE 160-9-4.8 mcg/actuation Hfaa  Generic drug: budesonide-glycopyr-formoterol  Inhale 2 puffs into the lungs 2 (two) times daily.            ASK your doctor about these medications      bumetanide 1 MG tablet  Commonly known as: BUMEX  Take 1 tablet (1 mg total) by mouth once daily. Okay to take bumex 1mg twice daily as needed for worsening shortness of breath, swelling or 3lb weight gain     FeroSuL 325 mg (65 mg iron) Tab tablet  Generic drug: ferrous sulfate  Take 1 tablet (325 mg total) by mouth every other day.     meclizine 12.5 mg tablet  Commonly known as: ANTIVERT  Take 1 tablet (12.5 mg total) by mouth Daily.     metoprolol succinate 25 MG 24 hr tablet  Commonly known as: TOPROL-XL  Take ½ tablet (12.5 mg total) by mouth once daily.     midodrine 2.5 MG Tab  Commonly known as: PROAMATINE  Take 1 tablet (2.5 mg total) by mouth 3 (three) times daily.     potassium chloride 10 MEQ Cpsr  Commonly known as: MICRO-K  Take 2 capsules (20 mEq total) by mouth once daily.  Ask about: Which instructions should I use?              Pending Diagnostic Studies:       Procedure Component Value Units Date/Time    Basic metabolic panel [[id number]] Collected: 04/06/25 0833    Order Status: Sent Lab Status: No result     Specimen: Blood     Comprehensive metabolic panel - if not done in ED [[id number]] Collected: 04/09/25 0430    Order Status: Sent Lab Status: No result     Specimen: Blood     Magnesium - if not done in ED [[id number]] Collected: 04/09/25 0430    Order Status: Sent Lab Status: No result     Specimen: Blood     Phosphorus - if not done in ED [[id number]] Collected: 04/09/25 0430    Order Status: Sent Lab Status: No result     Specimen: Blood            Indwelling Lines/Drains at time of discharge:   Lines/Drains/Airways       Drain  Duration             Female External Urinary Catheter w/ Suction 04/14/25 0730 16 days                    Time spent on the discharge of patient: 40 minutes         Uche Antunez MD  Department of Hospital Medicine  [facility] - [facility]

## 2025-04-30 NOTE — PROGRESS NOTES
[facility]  Palliative Medicine  Progress Note    Patient Name: Misa Barajas  MRN: [id number]  Admission Date: 4/6/2025  Hospital Length of Stay: 24 days  Code Status: DNR   Attending Provider: Aquilino Mayo MD  Consulting Provider: Aquilino Mayo MD  Primary Care Physician: Isela Langston MD  Principal Problem:Palliative care encounter    Patient information was obtained from patient and nursing .      Assessment/Plan:     Palliative Care  * Palliative care encounter  Impression:  Misa Barajas is a 79 y.o. female with PMHx of COPD on 2L home O2, HFpEF, AFL s/p RFA (6/17/24 w/ Dr. Church), CKD III, HTN, HLD, chronic venous insufficiency, orthostatic hypotension who was brought via EMS after per history she was found confused and bradycardiac with HR in the 20s. Patient was percutaneously paced. During hospitalization, was intubated then extubated with residual hoarseness of voice (ENT following), required intermittent pacing by TVP, a Micra PM by EP placed 04/10, aggressive diuresing, recurrent sepsis and hypothermia, patient has required several trials of antibiotics, anticipating WBC scan on 4/25 (ID following), and presumed PE (unable to verify with CTA secondary to MARQUEZ).      Of note, patient has had 7x ED visits in the last year. Patient has engaged in goals of care discussions with Dr. Velasquez of Palliative Medicine for goals of care and symptom management. Palliative Medicine was consulted by primary, Dr. Antunez, for goals of care and advanced care planning discussions.    Patient is currently resting in bed, AAOx4, daughters (Sirisha and Esther) at bedside. Patient and family are amenable to Pal Med. Pal Med APRN and LCSW at bedside for goals of care discussions.     Transferred to [facility] on 4/29 after transitioning to comfort focused care.    - Prior experience with serious illness: yes  -The patient has previously engaged in advance care planning or GOC discussions  -  "Insight/Understanding of illness: yes      Advance Care Planning    Date: 04/29/2025  - Family meeting at bedside with Esther Grimm, Dr. Antunez, and HAFSA Hdz DNP.   - Philosophies of hospice reviewed.   - Patient and family amenable to pivoting to comfort-focus care and transferring to [facility].  - Case discussed with [facility] Attending, Dr. Mayo and case management team.   - Transfer orders placed. Sirisha and Esther notified via telephone.     Date: 04/28/2025  - Patient resting in bed, 4L O2, jeffrey hugger warming blanket in place, no acute distress upon my visit. Patient's daughters arrive at bedside shortly after.   - Daughters requested we meet outside of room so that we do not disturb patient's rest. I met with patient's daughter in 3rd floor Visitor's Garden.  - We discussed, patient's recent hospital encounters, current hospitalization, and patient's progressive health decline.   - Daughters acknowledge patient's journey in/out of hospital for past year has been tiring and exhausting and are interested in exploring hospice options. Sirisha states she has been trying to juggle working and caring for her mom after her work day, but is limited in the care that she gives her mom because she has no past medical background. Esther states she has been fortunate to be able to work from home to be with her mom more often. Both daughters acknowledged the emotional strain caring for their mom has caused. Daughters state when something "goes wrong", they try to do as much as they can (limited to checking a blood pressure, O2 sats, and giving a dose of zofran). Once they have exhausted those interventions, they call 911 because they know they are "in over their heads" to get additional help. Patient is then admitted to hospital for additional workup. Sirisha states that it has been so traumatizing when she sees her mom is sick, tries to do everything she can to care for her mom, notices it is not working, and hears her mom begging " "for help, but refuses to go to the hospital. This has created immense stress on the daughters. Patient never wants to go to the hospital, but eventually agrees to come to ED because either daughters convince her or because patient becomes too sick to independently make a decision. Sirisha states she is worried that she is "giving up" on her mom even though patient has been "fighting" for so long. Philosophies of hospice introduced and discussed. Esther states she has been caring for and thinking about how her mom has progressively declined over the last year and expresses desires to prioritize patient's comfort knowing that with comfort being the priority, life will be limited. Daughters acknowledged patient wants to go home, but state they do not have the resources to continue to care for the patient at home. Daughters inquired about inpatient hospice unit, discussed in-patient hospice criteria. During conversation, daughters became appropriately tearful. Emotional support provided. Daughters asked for additional time to process steps forward and where the patient would continued to be cared for.   - After speaking with the daughters, I rounded on the patient again. Patient was awake and amenable to Pal Med. Patient acknowledges her health decline and states it has been taxing and tiring as she has had multiple hospitalization, including the current encounter. Patient agrees that her comfort has not been prioritized over the last year and if open to talking more about hospice care. Philosophies of hospice were introduced and discussed. Patient is accepting, and states she would like to go home. I informed patient that daughters expressed concerns being able to care for patient and would like to talk more about facility placement/in-patient hospice unit. Patient expressed that she was overwhelmed with the conversation and describes it as "a lot." Patient acknowledges she was not expecting to be having this [hospice] " conversation so soon, but understands that we need to have it because she has not had much quality of life lately. Patient states she would like additional time to talk with her daughters.   - Tentative family meeting 4/29/30 at 0900. Primary team, Dr. Antunez, updated via secure chat.    Date: 04/25/2025  - No acute events overnight. Patient anticipates NM Inflammatory Whole Body scan today.  - Revisited goals of care discussion from yesterday. Patient acknowledged it was a difficult conversation to have, but has been open to it. As we anticipate more information from the pending studies, goals of care discussions are also pending. Patient expressed concerns regarding nuclear scan and when it will be getting done. Patient states she thought it would have been done by now. I followed up with radiology to inquire about scan time, and updated patient on the 1-3pm time frame that radiology hopes to see patient. Patient's daughter Esther was at bedside and updated.   - Patient with no needs at this time.  - Primary team updated.        Date: 04/24/2025    Code Status  In light of the patients advanced and life limiting illness, I engaged the patient and family in a voluntary conversation about the patient's preferences for care  at the very end of life. The patient wishes to have a natural, peaceful death.  Along those lines, the patient does not wish to have CPR or other invasive treatments performed when her heart and/or breathing stops. I communicated to the patient and family that a DNR order would be placed in her medical record to reflect this preference.        [facility]  I engaged the patient and family in a voluntary conversation about advance care planning and we specifically addressed what the goals of care would be moving forward, in light of the patient's change in clinical status, specifically recurrent hospitalizations over past year, hypothermia, acute renal failure, CHF exacerbation, and current hosptialization.  " We did specifically address the patient's likely prognosis, which  appears to be poor .  We explored the patient's values and preferences for future care.  The patient and family endorses that what is most important right now is to focus on spending time at home, avoiding the hospital, quality of life, even if it means sacrificing a little time, and allowing additional time for infectious workup    Accordingly, we have decided that the best plan to meet the patient's goals includes continuing with treatment and continuing goals of care discussions pending infectious workup               Life Limiting Diagnosis  Acute on Chronic CHF  Acute Renal Failure on CKD stage 3b  Complete Heart Block    Symptom Management  - Pain: no  - acetaminophen 650mg q6h PRN    - Dyspnea: no  - on 2L at home  - maintained on 4L O2    - Constipation: no  - senna-docusate daily PRN  - poluethylene glycol daily PRN    - Nausea: no  - ondansetron 8mg q8h    - Debility: yes  - Functional status: fair.  Pt was not able to manage ADLs  - Fall precautions  - PT/OT recommending moderate intensity therapy          Plan:   - Code status: DNR  - Disposition: working for NH with hospice; family exploring options  - comfort focused care plan          Subjective:     Chief Complaint:   Chief Complaint   Patient presents with    Bradycardia     Arrives via EMS hypotensive 80s/50s and bradycadic in the 20s.       HPI:   As per H&P, "Misa Barajas is a 79 y.o. female COPD on 2L home O2, HFpEF, AFL s/p RFA (6/17/24 w/ Dr. Church), CKD III, HTN, HLD, chronic venous insufficiency, orthostatic hypotension who was brought via EMS after per history she was found confused and bradycardiac with HR in the 20s.They started epi and transcutaneous pacing. Given Versed for the transcutaneous pacing. They had to bag her in route. We do not have strip of underlying rhythm. Patient obtunded for which she was intubated for airway protection on arrival to the ED and " transcutaneous pacing was exchanged to transvenous pacing via RIJ (threshold 0.6). Underlying rate 44 bifascicular block. Known RBBB. Per daughters patient had called them over the phone when they noticed she appeared confused. Due to concerns for a possible syncopal episode EMS was called. Prior to event patient had been complaining of malaise and nausea for which she took about two Zofran pills per the daughters.      She had been recently seen on 3/27/25 at the [facility] visit following recent admission for AHRF 2/2 ADHF and COPD exacerbation. On ED presentation BNP elevated to 2500, troponin elevated to 19, CXR with pulmonary edema. She was started on steroids, nebs and antibiotics in addition to IV diuresis. Updated TTE stable from prior, showing HFpEF with elevated CVP 15. Her O2 requirements improved to baseline with diuresis and she was subsequently discharged. At that visit patient had not required midodrine as blood pressure had remained stable and she was asymptomatic.      Off note, patient was taken off OAC due to bleeding issues and no recurrent of typical atrial flutter after ablation.      Overview/Hospital Course:  Admitted to CCU for CHB in s/o hyperkalemia & in the presence of conduction disease/RBBB & LAHB - stable w/ temporary wire. ??Septic shock deemed to be d/t UTI - ?? UA with only 7 WBCs); vasopressors weaned off. HTN; started NG gtt. Acute on chronic hypoxic/hypercapnic RF & obtunded; intubated. Successfully extubated 04/08. MARQUEZ on CKD; improved w/ good UOP response to diuresis. PVCs w/ intermittent pacing by TVP. Switched pip-tazo to amox-clav on 04/09; continued to improve; empiric EED 04/13 for 7 day course. Micra PM by EP placed 04/10. Medically stable for stepdown. Patient was extremely short of breath with physical therapy, chest x-ray concerning for pulmonary edema.  Started patient on IV diuresis, now euvolemic.  ENT was consulted yesterday due to hoarseness of voice.  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 Pt uninterested in any procedure at this time, ENT recommended SLP follow up.  Able to tolerate regular diet at this point.  Now agreeable for SNF, working on placement. Course c/b worsening hypoxia & hyperNa.       Overnight 4/18-4/19, decompensated with recurrent sepsis, hypothermia 92, possible PNA with infiltrates on CXR, Zosyn IV started. Remained hypothermic despite Zosyn x 2 doses and Jeffrey hugger in place (pt uncomfortable/hot and asking for removal), so added Vanc IV STAT. Checked TSH and FT4-  wnl. UA with pyuria however not a clean catch w 33 sq epi, so repeated UA. C/o "stomach" pain, overall picture with the "stomach" pain and septic picture is "exactly what led us to ICU 2 weeks ago" per daughter. +TTP RUQ, RLQ, LLQ.   Poor UOP, only 200cc by mid-shift, and still hyperNa with FWD 1.8L so started gentle hydration with D5W @ 75cc for 24 hrs. May need to diurese again after sepsis resolves. CT C/A/P noncon with pulm edema, B pleural effusions, possible PNA, no intra-abdominal process seen.      On 4/21, Mental status much improved, however still with mild confusion and feels very tired.   decompensated further with recurrent MARQUEZ, C/o new SOB and hypoxemic 90% on 4L (over her baseline of 2L at home), improved to >94% on 6L with improvement of SOB.  C/o new L chest pressure/pain 4/10 (non-radiating), c/f MI or PE, resolved after nitro SL x 1 however HypoT with SBP upper 80s so will avoid further nitro.  HS trop 38 (decreased from prior), BNP 700s (increased from prior).CXR with pulm edema, all most c/w ADHF- Lasix 40 IV x 1 given.    Concern for untreated sepsis with ongoing hypothermia requiring jeffrey hugger despite Vanc/Zosyn for over 24 hours, so broadened to Vanc/Sher/Dinah, consulted ID, repeated BCx x 2. Lactate wnl.  Re: concern for PE, Known LIJ DVT, apixaban x several days then stopped (likely for hemorrhage of vocal fold)- " "discussed w ENT 4/20, ok to resume anticoag if needed. PE workup- F/u STAT UE and LE Dopplers. Unable to check CTA chest given MARQUEZ, unable to check V/Q given pulm edema. Anticoagulation with Apixaban.  Hypothermia returned however pt looks and feels the best she has in 4 days.  Voice is getting stronger.  Sitting in chair.  UCx resulted with ESBL Klebsiella, so Sher has been good coverage.  ID changed to Dapto (from Vanc), continuing Sher, for coverage of UTI and well as PNA.  BCx have all been negative.  Continuing Eliquis for DVt LUE and presumed PE.  Acute hypox resp failure 2/2 ADHF and presumed PE, still on 6L, dosing Lasix IV PRN daily given yet-unclear status of her sepsis and avoiding volume depletion in sepsis.      Prior to admit, was independent in her residence with her cat.  Two daughters very supportive.         Interval History: Pt seen and examined this morning on rounds. Hypothermia ongoing this morning, required Manny hugger.  Resolved in afternoon.  If hypothermia returns overnight, will repeat BCx x 2.  Attempting sputum sample for Cx as well, not optimistic for any yield. NM Full body tagged WBC scan per ID recs- will get injection tomorrow 4/24 then scan on 4/25.      Re-dosed Lasix at higher dose 80mg IV x 1, re-eval again tomorrow.      Pall care consulted, NOT for hospice, NOT due to a downward clinical trajectory, as pt shows signs of clinical improvement except for the hypothermia -- consult is rather for continuity and continuation of ACP/GOC started as outpt prior to admission.       Care plan reviewed. Otherwise, doing well and with no further complaints at this time."        Hospital Course:  No notes on file    Interval History: No acute events overnight. Transferred to the [facility] overnight. No acute events; ativan 0.5 mg x 1 for anxiety/restlessness    Eating small amounts and feeding self    No complaints of pain, nausea, anxiety, or dyspnea    Past Medical History:   Diagnosis Date    " Acute biliary pancreatitis 07/27/2024    Acute pancreatitis 07/27/2024    Acute pulmonary embolism 4/21/2025    Anemia 07/12/2024    Aortic atherosclerosis     Arthritis     knee joint pain    Asthma-COPD overlap syndrome 08/22/2022    home o2    Breast cancer 2002    left breast & lymph nodes-s/p sx with chemo    Cataracts, bilateral     Chronic diastolic heart failure 12/24/2019    Chronic kidney disease, stage 3     Class 1 obesity due to excess calories with serious comorbidity and body mass index (BMI) of 30.0 to 30.9 in adult 05/16/2024    Gram-negative bacteremia - Pasturella multocida 07/12/2024    History of chemotherapy     last treatment 12/2002 (had 8 treatments)    Hyperlipidemia 11/20/2016    Hypertension     Lymphedema of both lower extremities 01/25/2022    Nephrolithiasis 06/02/2014    Osteoporosis     Paroxysmal atrial fibrillation 06/07/2021    Renal osteodystrophy 01/14/2016    Sepsis 4/19/2025    Severe protein-calorie malnutrition 4/23/2025    Shock 04/06/2025    Subclinical hypothyroidism 04/06/2025    Venous stasis dermatitis of both lower extremities 01/25/2022    Vitamin D insufficiency        Past Surgical History:   Procedure Laterality Date    ABLATION, ATRIAL FLUTTER, TYPICAL N/A 6/17/2024    Procedure: Ablation, Atrial Flutter, Typical;  Surgeon: Taz Church MD;  Location: [facility] EP LAB;  Service: Cardiology;  Laterality: N/A;  AFL, RFA, LORENE, MAKAYLA (cx if SR), anes, MB, 3 Prep    BREAST BIOPSY Left 2002    core bx, +    BREAST BIOPSY Right 2018    core    BREAST BIOPSY Right 2019    BREAST LUMPECTOMY Left 2002    CYSTOSCOPY  4/28/2022    Procedure: CYSTOSCOPY;  Surgeon: Vik Ware MD;  Location: [facility] OR [address] [place] [address];  Service: Urology;;    CYSTOSCOPY  5/30/2022    Procedure: CYSTOSCOPY;  Surgeon: Bnonie Knutson MD;  Location: [facility] OR [address] [place] [address];  Service: Urology;;    ECHOCARDIOGRAM,TRANSESOPHAGEAL N/A 6/17/2024    Procedure: Transesophageal echo (MAKAYLA) intra-procedure  log documentation;  Surgeon: Nestor Martinez MD;  Location: [facility] EP LAB;  Service: Cardiology;  Laterality: N/A;    ERCP N/A 7/30/2024    Procedure: ERCP (ENDOSCOPIC RETROGRADE CHOLANGIOPANCREATOGRAPHY);  Surgeon: Sierra Cotto MD;  Location: [facility] ENDO (2ND FLR);  Service: Endoscopy;  Laterality: N/A;    IMPLANTATION OF LEADLESS PACEMAKER N/A 4/10/2025    Procedure: INSERTION, CARDIAC PACEMAKER, LEADLESS;  Surgeon: CAROLINE Solis MD;  Location: [facility] EP LAB;  Service: Cardiology;  Laterality: N/A;  CHB, MICRA leadless PPM, MDT, Anes, EH, CICU 3073    LASER LITHOTRIPSY  5/30/2022    Procedure: LITHOTRIPSY, USING LASER;  Surgeon: Bonnie Knutson MD;  Location: [facility] OR 1ST FLR;  Service: Urology;;    LITHOTRIPSY      MASTECTOMY Left 06/2002    left-& lymph node dissection    REMOVAL, PACEMAKER, TEMPORARY TRANSVENOUS  4/10/2025    Procedure: Removal, Pacemaker, Temporary Transvenous;  Surgeon: CAROLINE Solis MD;  Location: [facility] EP LAB;  Service: Cardiology;;    REPLACEMENT OF STENT Right 5/30/2022    Procedure: REPLACEMENT, STENT;  Surgeon: Bonnie Knutson MD;  Location: [facility] OR 1ST FLR;  Service: Urology;  Laterality: Right;    RETROGRADE PYELOGRAPHY Right 4/28/2022    Procedure: PYELOGRAM, RETROGRADE;  Surgeon: Vik Ware MD;  Location: [facility] OR 1ST FLR;  Service: Urology;  Laterality: Right;    ROBOT-ASSISTED LAPAROSCOPIC PARTIAL NEPHRECTOMY USING DA JESÚS XI Right 3/11/2021    Procedure: XI ROBOTIC NEPHRECTOMY, PARTIAL;  Surgeon: Taz Ortega MD;  Location: [facility] OR 2ND FLR;  Service: Urology;  Laterality: Right;  4hr/ gen with regional  [facility] confirmation:  [id number] for 11:15am case NC    URETEROSCOPIC REMOVAL OF URETERIC CALCULUS Right 5/30/2022    Procedure: REMOVAL, CALCULUS, URETER, URETEROSCOPIC;  Surgeon: Bonnie Knutson MD;  Location: [facility] OR 1ST FLR;  Service: Urology;  Laterality: Right;    ureteroscopy Bilateral     6.14    URETEROSCOPY Right 5/30/2022     Procedure: URETEROSCOPY;  Surgeon: Bonnie Knutson MD;  Location: [facility] OR [address] [place] [address];  Service: Urology;  Laterality: Right;       Review of patient's allergies indicates:   Allergen Reactions    Adhesive tape-silicones     Adhesive Rash     Pt states she removed a LATEX bandaid and the skin beneath was swollen and red. No other latex causes a reaction.       Medications:  Continuous Infusions:  Scheduled Meds:   apixaban  5 mg Oral BID    levothyroxine  50 mcg Oral Before breakfast    LIDOcaine  1 patch Transdermal Q24H    metoprolol tartrate  12.5 mg Oral BID     PRN Meds:  Current Facility-Administered Medications:     acetaminophen, 650 mg, Oral, Q6H PRN    albuterol-ipratropium, 3 mL, Nebulization, Q6H PRN    HYDROmorphone, 0.5 mg, Intravenous, Q4H PRN    lorazepam, 0.5 mg, Intravenous, Q30 Min PRN    nitroGLYCERIN, 0.4 mg, Sublingual, Q5 Min PRN    ondansetron, 8 mg, Oral, Q8H PRN    polyethylene glycol, 17 g, Oral, BID PRN    senna, 8.6 mg, Oral, Daily PRN    simethicone, 1 tablet, Oral, TID PRN    Family History       Problem Relation (Age of Onset)    Arthritis Mother, Sister    Bone cancer Mother    Breast cancer Mother    Cancer Father    Crohn's disease Daughter    Fibroids Daughter    No Known Problems Brother, Daughter          Tobacco Use    Smoking status: Former     Current packs/day: 0.00     Average packs/day: 1 pack/day for 50.0 years (50.0 ttl pk-yrs)     Types: Cigarettes     Start date: 1960     Quit date: 5/20/2009     Years since quitting: 15.9    Smokeless tobacco: Former   Substance and Sexual Activity    Alcohol use: No    Drug use: No    Sexual activity: Not Currently     Partners: Male     Birth control/protection: Partner-Vasectomy       Review of Systems   Constitutional:  Positive for activity change and fatigue.   HENT:  Positive for voice change.    Respiratory:  Positive for shortness of breath.    Cardiovascular:  Positive for leg swelling.   Gastrointestinal:  Positive for  nausea.   Neurological:  Positive for weakness.   Hematological:  Bruises/bleeds easily.     Objective:     Vital Signs (Most Recent):  Temp: 97.9 °F (36.6 °C) (04/30/25 0745)  Pulse: 68 (04/30/25 0739)  Resp: 15 (04/30/25 0739)  BP: 129/62 (04/30/25 0739)  SpO2: (!) 83 % (04/30/25 0739) Vital Signs (24h Range):  Temp:  [97.6 °F (36.4 °C)-97.9 °F (36.6 °C)] 97.9 °F (36.6 °C)  Pulse:  [68-76] 68  Resp:  [15-18] 15  SpO2:  [83 %-95 %] 83 %  BP: (129-147)/(62-65) 129/62     Weight: 70.5 kg (155 lb 6.8 oz)  Body mass index is 26.68 kg/m².       Physical Exam  Vitals and nursing note reviewed.   Constitutional:       General: She is not in acute distress.     Appearance: She is ill-appearing.   HENT:      Head: Normocephalic and atraumatic.      Nose: Nose normal.      Mouth/Throat:      Mouth: Mucous membranes are dry.   Eyes:      Extraocular Movements: Extraocular movements intact.   Cardiovascular:      Rate and Rhythm: Normal rate.   Pulmonary:      Effort: Pulmonary effort is normal.   Skin:     General: Skin is warm and dry.   Neurological:      Mental Status: She is alert and oriented to person, place, and time.      Motor: Weakness present.   Psychiatric:         Mood and Affect: Mood normal.         Behavior: Behavior normal.         Thought Content: Thought content normal.         Judgment: Judgment normal.            Review of Symptoms      Symptom Assessment (ESAS 0-10 Scale)  Pain:  0  Dyspnea:  0  Anxiety:  0  Nausea:  0  Depression:  0  Anorexia:  0  Fatigue:  0  Insomnia:  0  Restlessness:  0  Agitation:  0     CAM / Delirium:  Negative  Constipation:  Negative  Diarrhea:  Negative      Modified Berto Scale:  1    Performance Status:  50    Psychosocial/Cultural:   See Palliative Psychosocial Note: Yes  - Previously , now .  - Prior to patient's hospitalizations, she was living alone. Daughter lived in neighborhood and assisted patient with cleaning and cooking  - Over the last year,  "patient/daughters report patient has spent more time in the hospital/rehab than at home.   - Patient states he enjoys sitting on her porch, gardening in her garden pots, and spending time with her tabby cat (male, Romeo)   - Patient's primary support system includes her 2 daughters, Sirisha and Esther.  - previously enjoyed driving, shopping at [employer], [employer], and [place], but states it has been a while since she has been able to do all the things she enjoys  **Primary  to Follow**  Palliative Care  Consult: Yes    Spiritual:  F - Megan and Belief:  Restorationism        Advance Care Planning  Advance Directives:   Living Will: Yes        Copy on chart: Yes    LaPOST: No    Do Not Resuscitate Status: Yes    Medical Power of : Yes    Agent's Name:  Esther Barajas   Agent's Contact Number:  [phone]    Decision Making:  Patient answered questions and Family answered questions  Goals of Care: The patient endorses that what is most important right now is to focus on symptom/pain control, quality of life, even if it means sacrificing a little time, and comfort and QOL     Accordingly, we have decided that the best plan to meet the patient's goals includes enrolling in hospice care and pivot to comfort-focused care           Significant Labs: All pertinent labs within the past 24 hours have been reviewed.  CBC:   Recent Labs   Lab 04/29/25  0526   WBC 6.38   HGB 11.5*   HCT 37.3   MCV 92        BMP:  No results for input(s): "GLU", "NA", "K", "CL", "CO2", "BUN", "CREATININE", "CALCIUM", "MG" in the last 24 hours.    LFT:  Lab Results   Component Value Date    AST 47 (H) 04/29/2025    ALKPHOS 107 04/29/2025    BILITOT 0.5 04/29/2025     Albumin:   Albumin   Date Value Ref Range Status   04/29/2025 2.2 (L) 3.5 - 5.2 g/dL Final   02/18/2025 2.8 (L) 3.5 - 5.2 g/dL Final     Protein:   Protein Total   Date Value Ref Range Status   04/29/2025 5.4 (L) 6.0 - 8.4 gm/dL Final "     Total Protein   Date Value Ref Range Status   02/18/2025 6.0 6.0 - 8.4 g/dL Final     Lactic acid:   Lab Results   Component Value Date    LACTATE 1.6 04/21/2025    LACTATE 0.8 04/20/2025       Significant Imaging: I have reviewed all pertinent imaging results/findings within the past 24 hours.      WBC Whole Body Scan 4/25/25  Impression:     The mild diffuse increased uptake around the left knee joint likely related to infectious/ inflammatory arthritis.  No other foci of abnormal increased uptake to suggest other source of infection    Aquilino Mayo MD  Palliative Medicine  [facility]

## 2025-04-30 NOTE — SUBJECTIVE & OBJECTIVE
Interval History: No acute events overnight. Transferred to the [facility] overnight. No acute events; ativan 0.5 mg x 1 for anxiety/restlessness    Eating small amounts and feeding self    No complaints of pain, nausea, anxiety, or dyspnea    Past Medical History:   Diagnosis Date    Acute biliary pancreatitis 07/27/2024    Acute pancreatitis 07/27/2024    Acute pulmonary embolism 4/21/2025    Anemia 07/12/2024    Aortic atherosclerosis     Arthritis     knee joint pain    Asthma-COPD overlap syndrome 08/22/2022    home o2    Breast cancer 2002    left breast & lymph nodes-s/p sx with chemo    Cataracts, bilateral     Chronic diastolic heart failure 12/24/2019    Chronic kidney disease, stage 3     Class 1 obesity due to excess calories with serious comorbidity and body mass index (BMI) of 30.0 to 30.9 in adult 05/16/2024    Gram-negative bacteremia - Pasturella multocida 07/12/2024    History of chemotherapy     last treatment 12/2002 (had 8 treatments)    Hyperlipidemia 11/20/2016    Hypertension     Lymphedema of both lower extremities 01/25/2022    Nephrolithiasis 06/02/2014    Osteoporosis     Paroxysmal atrial fibrillation 06/07/2021    Renal osteodystrophy 01/14/2016    Sepsis 4/19/2025    Severe protein-calorie malnutrition 4/23/2025    Shock 04/06/2025    Subclinical hypothyroidism 04/06/2025    Venous stasis dermatitis of both lower extremities 01/25/2022    Vitamin D insufficiency        Past Surgical History:   Procedure Laterality Date    ABLATION, ATRIAL FLUTTER, TYPICAL N/A 6/17/2024    Procedure: Ablation, Atrial Flutter, Typical;  Surgeon: Taz Church MD;  Location: [facility] LAB;  Service: Cardiology;  Laterality: N/A;  AFL, RFA, LORENE, MAKAYLA (cx if SR), LEANNE miller, 3 Prep    BREAST BIOPSY Left 2002    core bx, +    BREAST BIOPSY Right 2018    core    BREAST BIOPSY Right 2019    BREAST LUMPECTOMY Left 2002    CYSTOSCOPY  4/28/2022    Procedure: CYSTOSCOPY;  Surgeon: Vik Ware MD;  Location:  [facility] OR 1ST FLR;  Service: Urology;;    CYSTOSCOPY  5/30/2022    Procedure: CYSTOSCOPY;  Surgeon: Bonnie Knutson MD;  Location: [facility] OR 1ST FLR;  Service: Urology;;    ECHOCARDIOGRAM,TRANSESOPHAGEAL N/A 6/17/2024    Procedure: Transesophageal echo (MAKAYLA) intra-procedure log documentation;  Surgeon: Nestor Martinez MD;  Location: [facility] EP LAB;  Service: Cardiology;  Laterality: N/A;    ERCP N/A 7/30/2024    Procedure: ERCP (ENDOSCOPIC RETROGRADE CHOLANGIOPANCREATOGRAPHY);  Surgeon: Sierra Cotto MD;  Location: [facility] ENDO (2ND FLR);  Service: Endoscopy;  Laterality: N/A;    IMPLANTATION OF LEADLESS PACEMAKER N/A 4/10/2025    Procedure: INSERTION, CARDIAC PACEMAKER, LEADLESS;  Surgeon: CAROLINE Solis MD;  Location: [facility] EP LAB;  Service: Cardiology;  Laterality: N/A;  CHB, MICRA leadless PPM, MDT, Anes, EH, CICU [id number]    LASER LITHOTRIPSY  5/30/2022    Procedure: LITHOTRIPSY, USING LASER;  Surgeon: Bonnie Knutson MD;  [facility];  Service: Urology;;    LITHOTRIPSY      MASTECTOMY Left 06/2002    left-& lymph node dissection    REMOVAL, PACEMAKER, TEMPORARY TRANSVENOUS  4/10/2025    Procedure: Removal, Pacemaker, Temporary Transvenous;  Surgeon: CAROLINE Solis MD;  Location: [facility] EP LAB;  Service: Cardiology;;    REPLACEMENT OF STENT Right 5/30/2022    Procedure: REPLACEMENT, STENT;  Surgeon: Bonnie Knutson MD;  [facility];  Service: Urology;  Laterality: Right;    RETROGRADE PYELOGRAPHY Right 4/28/2022    Procedure: PYELOGRAM, RETROGRADE;  Surgeon: Vik Ware MD;  Location: [facility] OR 1ST FLR;  Service: Urology;  Laterality: Right;    ROBOT-ASSISTED LAPAROSCOPIC PARTIAL NEPHRECTOMY USING DA JESÚS XI Right 3/11/2021    Procedure: XI ROBOTIC NEPHRECTOMY, PARTIAL;  Surgeon: Taz Ortega MD;  Location: [facility] OR 2ND FLR;  Service: Urology;  Laterality: Right;  4hr/ gen with regional  [place] confirmation:  [id number] for 11:15am case NC    URETEROSCOPIC  REMOVAL OF URETERIC CALCULUS Right 5/30/2022    Procedure: REMOVAL, CALCULUS, URETER, URETEROSCOPIC;  Surgeon: Bonnie Knutson MD;  Location: [facility] OR [address] [place] [address];  Service: Urology;  Laterality: Right;    ureteroscopy Bilateral     6.14    URETEROSCOPY Right 5/30/2022    Procedure: URETEROSCOPY;  Surgeon: Bonnie Knutson MD;  Location: [facility] OR [address] [place] [address];  Service: Urology;  Laterality: Right;       Review of patient's allergies indicates:   Allergen Reactions    Adhesive tape-silicones     Adhesive Rash     Pt states she removed a LATEX bandaid and the skin beneath was swollen and red. No other latex causes a reaction.       Medications:  Continuous Infusions:  Scheduled Meds:   apixaban  5 mg Oral BID    levothyroxine  50 mcg Oral Before breakfast    LIDOcaine  1 patch Transdermal Q24H    metoprolol tartrate  12.5 mg Oral BID     PRN Meds:  Current Facility-Administered Medications:     acetaminophen, 650 mg, Oral, Q6H PRN    albuterol-ipratropium, 3 mL, Nebulization, Q6H PRN    HYDROmorphone, 0.5 mg, Intravenous, Q4H PRN    lorazepam, 0.5 mg, Intravenous, Q30 Min PRN    nitroGLYCERIN, 0.4 mg, Sublingual, Q5 Min PRN    ondansetron, 8 mg, Oral, Q8H PRN    polyethylene glycol, 17 g, Oral, BID PRN    senna, 8.6 mg, Oral, Daily PRN    simethicone, 1 tablet, Oral, TID PRN    Family History       Problem Relation (Age of Onset)    Arthritis Mother, Sister    Bone cancer Mother    Breast cancer Mother    Cancer Father    Crohn's disease Daughter    Fibroids Daughter    No Known Problems Brother, Daughter          Tobacco Use    Smoking status: Former     Current packs/day: 0.00     Average packs/day: 1 pack/day for 50.0 years (50.0 ttl pk-yrs)     Types: Cigarettes     Start date: 1960     Quit date: 5/20/2009     Years since quitting: 15.9    Smokeless tobacco: Former   Substance and Sexual Activity    Alcohol use: No    Drug use: No    Sexual activity: Not Currently     Partners: Male     Birth  control/protection: Partner-Vasectomy       Review of Systems   Constitutional:  Positive for activity change and fatigue.   HENT:  Positive for voice change.    Respiratory:  Positive for shortness of breath.    Cardiovascular:  Positive for leg swelling.   Gastrointestinal:  Positive for nausea.   Neurological:  Positive for weakness.   Hematological:  Bruises/bleeds easily.     Objective:     Vital Signs (Most Recent):  Temp: 97.9 °F (36.6 °C) (04/30/25 0745)  Pulse: 68 (04/30/25 0739)  Resp: 15 (04/30/25 0739)  BP: 129/62 (04/30/25 0739)  SpO2: (!) 83 % (04/30/25 0739) Vital Signs (24h Range):  Temp:  [97.6 °F (36.4 °C)-97.9 °F (36.6 °C)] 97.9 °F (36.6 °C)  Pulse:  [68-76] 68  Resp:  [15-18] 15  SpO2:  [83 %-95 %] 83 %  BP: (129-147)/(62-65) 129/62     Weight: 70.5 kg (155 lb 6.8 oz)  Body mass index is 26.68 kg/m².       Physical Exam  Vitals and nursing note reviewed.   Constitutional:       General: She is not in acute distress.     Appearance: She is ill-appearing.   HENT:      Head: Normocephalic and atraumatic.      Nose: Nose normal.      Mouth/Throat:      Mouth: Mucous membranes are dry.   Eyes:      Extraocular Movements: Extraocular movements intact.   Cardiovascular:      Rate and Rhythm: Normal rate.   Pulmonary:      Effort: Pulmonary effort is normal.   Skin:     General: Skin is warm and dry.   Neurological:      Mental Status: She is alert and oriented to person, place, and time.      Motor: Weakness present.   Psychiatric:         Mood and Affect: Mood normal.         Behavior: Behavior normal.         Thought Content: Thought content normal.         Judgment: Judgment normal.            Review of Symptoms      Symptom Assessment (ESAS 0-10 Scale)  Pain:  0  Dyspnea:  0  Anxiety:  0  Nausea:  0  Depression:  0  Anorexia:  0  Fatigue:  0  Insomnia:  0  Restlessness:  0  Agitation:  0     CAM / Delirium:  Negative  Constipation:  Negative  Diarrhea:  Negative      Modified Berto Scale:   "1    Performance Status:  50    Psychosocial/Cultural:   See Palliative Psychosocial Note: Yes  - Previously , now .  - Prior to patient's hospitalizations, she was living alone. Daughter lived in neighborhood and assisted patient with cleaning and cooking  - Over the last year, patient/daughters report patient has spent more time in the hospital/rehab than at home.   - Patient states he enjoys sitting on her porch, gardening in her garden pots, and spending time with her tabby cat (male, Romeo)   - Patient's primary support system includes her 2 daughters, Sirisha and Esther.  - previously enjoyed driving, shopping at [employer], [employer], and [employer], but states it has been a while since she has been able to do all the things she enjoys  **Primary  to Follow**  Palliative Care  Consult: Yes    Spiritual:  F - Megan and Belief:  Restoration        Advance Care Planning   Advance Directives:   Living Will: Yes        Copy on chart: Yes    LaPOST: No    Do Not Resuscitate Status: Yes    Medical Power of : Yes    Agent's Name:  Esther Barajas   Agent's Contact Number:  [phone]    Decision Making:  Patient answered questions and Family answered questions  Goals of Care: The patient endorses that what is most important right now is to focus on symptom/pain control, quality of life, even if it means sacrificing a little time, and comfort and QOL     Accordingly, we have decided that the best plan to meet the patient's goals includes enrolling in hospice care and pivot to comfort-focused care           Significant Labs: All pertinent labs within the past 24 hours have been reviewed.  CBC:   Recent Labs   Lab 04/29/25  0526   WBC 6.38   HGB 11.5*   HCT 37.3   MCV 92        BMP:  No results for input(s): "GLU", "NA", "K", "CL", "CO2", "BUN", "CREATININE", "CALCIUM", "MG" in the last 24 hours.    LFT:  Lab Results   Component Value Date    AST 47 (H) 04/29/2025 "    ALKPHOS 107 04/29/2025    BILITOT 0.5 04/29/2025     Albumin:   Albumin   Date Value Ref Range Status   04/29/2025 2.2 (L) 3.5 - 5.2 g/dL Final   02/18/2025 2.8 (L) 3.5 - 5.2 g/dL Final     Protein:   Protein Total   Date Value Ref Range Status   04/29/2025 5.4 (L) 6.0 - 8.4 gm/dL Final     Total Protein   Date Value Ref Range Status   02/18/2025 6.0 6.0 - 8.4 g/dL Final     Lactic acid:   Lab Results   Component Value Date    LACTATE 1.6 04/21/2025    LACTATE 0.8 04/20/2025       Significant Imaging: I have reviewed all pertinent imaging results/findings within the past 24 hours.      WBC Whole Body Scan 4/25/25  Impression:     The mild diffuse increased uptake around the left knee joint likely related to infectious/ inflammatory arthritis.  No other foci of abnormal increased uptake to suggest other source of infection

## 2025-04-30 NOTE — NURSING
Patient transported to unit by off-going uniot charge nurse. Patient AAOx4. Respirations even and unlabored on 4LNC. Patient denies pain. Safety and fall precautions maintained. Care continued

## 2025-05-01 NOTE — CHAPLAIN
Patient: Misa Barajas  MRN: [id number]  : 1945  Age: 79 y.o.  Legal sex: female   Hospital Length of Stay: 25 days  Code Status: DNR   Attending Provider: Aquilino Mayo MD  Principal Problem: Palliative care encounter  Patient's Roman Catholic: Religious  Length of my visit: 5 min  Purpose of visit:   Hospice      attempted to visit; patient non-responsive, no family at bedside.  made referral to father Case per request unit nurse for anointing.          Rev. Dave Smart, [id number]  board certified , jorge ([place]+)     support is available and on-site . Please call the on-call  for any emergent spiritual care needs, [phone].

## 2025-05-01 NOTE — PROGRESS NOTES
[facility]  Palliative Medicine  Progress Note    Patient Name: Misa Barajas  MRN: [id number]  Admission Date: 4/6/2025  Hospital Length of Stay: 25 days  Code Status: DNR   Attending Provider: Aquilino Mayo MD  Consulting Provider: Aquilino Mayo MD  Primary Care Physician: Isela Langston MD  Principal Problem:Palliative care encounter    Patient information was obtained from patient and relative(s).      Assessment/Plan:     Palliative Care  * Palliative care encounter  Impression:  Misa Barajas is a 79 y.o. female with PMHx of COPD on 2L home O2, HFpEF, AFL s/p RFA (6/17/24 w/ Dr. Church), CKD III, HTN, HLD, chronic venous insufficiency, orthostatic hypotension who was brought via EMS after per history she was found confused and bradycardiac with HR in the 20s. Patient was percutaneously paced. During hospitalization, was intubated then extubated with residual hoarseness of voice (ENT following), required intermittent pacing by TVP, a Micra PM by EP placed 04/10, aggressive diuresing, recurrent sepsis and hypothermia, patient has required several trials of antibiotics, anticipating WBC scan on 4/25 (ID following), and presumed PE (unable to verify with CTA secondary to MARQUEZ).      Of note, patient has had 7x ED visits in the last year. Patient has engaged in goals of care discussions with Dr. Velasquez of Palliative Medicine for goals of care and symptom management. Palliative Medicine was consulted by primary, Dr. Antunez, for goals of care and advanced care planning discussions.    Patient is currently resting in bed, AAOx4, daughters (Sirisha and Esther) at bedside. Patient and family are amenable to Pal Med. Pal Med APRN and LCSW at bedside for goals of care discussions.     Transferred to [facility] on 4/29 after transitioning to comfort focused care.    - Prior experience with serious illness: yes  -The patient has previously engaged in advance care planning or GOC  "discussions  - Insight/Understanding of illness: yes      Advance Care Planning    Date: 04/29/2025  - Family meeting at bedside with Esther Grimm, Dr. Antunez, and HAFSA Hdz DNP.   - Philosophies of hospice reviewed.   - Patient and family amenable to pivoting to comfort-focus care and transferring to [facility].  - Case discussed with [facility] Attending, Dr. Mayo and case management team.   - Transfer orders placed. Sirisha and Esther notified via telephone.     Date: 04/28/2025  - Patient resting in bed, 4L O2, jeffrey hugger warming blanket in place, no acute distress upon my visit. Patient's daughters arrive at bedside shortly after.   - Daughters requested we meet outside of room so that we do not disturb patient's rest. I met with patient's daughter in 3rd floor Visitor's Garden.  - We discussed, patient's recent hospital encounters, current hospitalization, and patient's progressive health decline.   - Daughters acknowledge patient's journey in/out of hospital for past year has been tiring and exhausting and are interested in exploring hospice options. Sirisha states she has been trying to juggle working and caring for her mom after her work day, but is limited in the care that she gives her mom because she has no past medical background. Esther states she has been fortunate to be able to work from home to be with her mom more often. Both daughters acknowledged the emotional strain caring for their mom has caused. Daughters state when something "goes wrong", they try to do as much as they can (limited to checking a blood pressure, O2 sats, and giving a dose of zofran). Once they have exhausted those interventions, they call 911 because they know they are "in over their heads" to get additional help. Patient is then admitted to hospital for additional workup. Sirisha states that it has been so traumatizing when she sees her mom is sick, tries to do everything she can to care for her mom, notices it is not working, and hears her " "mom begging for help, but refuses to go to the hospital. This has created immense stress on the daughters. Patient never wants to go to the hospital, but eventually agrees to come to ED because either daughters convince her or because patient becomes too sick to independently make a decision. Sirisha states she is worried that she is "giving up" on her mom even though patient has been "fighting" for so long. Philosophies of hospice introduced and discussed. Esther states she has been caring for and thinking about how her mom has progressively declined over the last year and expresses desires to prioritize patient's comfort knowing that with comfort being the priority, life will be limited. Daughters acknowledged patient wants to go home, but state they do not have the resources to continue to care for the patient at home. Daughters inquired about inpatient hospice unit, discussed in-patient hospice criteria. During conversation, daughters became appropriately tearful. Emotional support provided. Daughters asked for additional time to process steps forward and where the patient would continued to be cared for.   - After speaking with the daughters, I rounded on the patient again. Patient was awake and amenable to Pal Med. Patient acknowledges her health decline and states it has been taxing and tiring as she has had multiple hospitalization, including the current encounter. Patient agrees that her comfort has not been prioritized over the last year and if open to talking more about hospice care. Philosophies of hospice were introduced and discussed. Patient is accepting, and states she would like to go home. I informed patient that daughters expressed concerns being able to care for patient and would like to talk more about facility placement/in-patient hospice unit. Patient expressed that she was overwhelmed with the conversation and describes it as "a lot." Patient acknowledges she was not expecting to be having " this [hospice] conversation so soon, but understands that we need to have it because she has not had much quality of life lately. Patient states she would like additional time to talk with her daughters.   - Tentative family meeting 4/29/30 at 0900. Primary team, Dr. Antunez, updated via secure chat.    Date: 04/25/2025  - No acute events overnight. Patient anticipates NM Inflammatory Whole Body scan today.  - Revisited goals of care discussion from yesterday. Patient acknowledged it was a difficult conversation to have, but has been open to it. As we anticipate more information from the pending studies, goals of care discussions are also pending. Patient expressed concerns regarding nuclear scan and when it will be getting done. Patient states she thought it would have been done by now. I followed up with radiology to inquire about scan time, and updated patient on the 1-3pm time frame that radiology hopes to see patient. Patient's daughter Esther was at bedside and updated.   - Patient with no needs at this time.  - Primary team updated.        Date: 04/24/2025    Code Status  In light of the patients advanced and life limiting illness, I engaged the patient and family in a voluntary conversation about the patient's preferences for care  at the very end of life. The patient wishes to have a natural, peaceful death.  Along those lines, the patient does not wish to have CPR or other invasive treatments performed when her heart and/or breathing stops. I communicated to the patient and family that a DNR order would be placed in her medical record to reflect this preference.        [facility]  I engaged the patient and family in a voluntary conversation about advance care planning and we specifically addressed what the goals of care would be moving forward, in light of the patient's change in clinical status, specifically recurrent hospitalizations over past year, hypothermia, acute renal failure, CHF exacerbation, and current  "hosptialization.  We did specifically address the patient's likely prognosis, which  appears to be poor .  We explored the patient's values and preferences for future care.  The patient and family endorses that what is most important right now is to focus on spending time at home, avoiding the hospital, quality of life, even if it means sacrificing a little time, and allowing additional time for infectious workup    Accordingly, we have decided that the best plan to meet the patient's goals includes continuing with treatment and continuing goals of care discussions pending infectious workup              Life Limiting Diagnosis  Acute on Chronic CHF  Acute Renal Failure on CKD stage 3b  Complete Heart Block    Symptom Management  - Pain: no  - acetaminophen 650mg q6h PRN    - Dyspnea: no  - on 2L at home  - maintained on 4L O2    - Constipation: no  - senna-docusate daily PRN  - poluethylene glycol daily PRN    - Nausea: no  - ondansetron 8mg q8h    - Debility: yes  - Functional status: fair.  Pt was not able to manage ADLs  - Fall precautions  - PT/OT recommending moderate intensity therapy          Plan:   - Code status: DNR  - Disposition: working for NH with hospice; family exploring options  - comfort focused care plan          Subjective:     Chief Complaint:   Chief Complaint   Patient presents with    Bradycardia     Arrives via EMS hypotensive 80s/50s and bradycadic in the 20s.       HPI:   As per H&P, "Misa Barajas is a 79 y.o. female COPD on 2L home O2, HFpEF, AFL s/p RFA (6/17/24 w/ Dr. Church), CKD III, HTN, HLD, chronic venous insufficiency, orthostatic hypotension who was brought via EMS after per history she was found confused and bradycardiac with HR in the 20s.They started epi and transcutaneous pacing. Given Versed for the transcutaneous pacing. They had to bag her in route. We do not have strip of underlying rhythm. Patient obtunded for which she was intubated for airway protection on arrival " to the ED and transcutaneous pacing was exchanged to transvenous pacing via RIJ (threshold 0.6). Underlying rate 44 bifascicular block. Known RBBB. Per daughters patient had called them over the phone when they noticed she appeared confused. Due to concerns for a possible syncopal episode EMS was called. Prior to event patient had been complaining of malaise and nausea for which she took about two Zofran pills per the daughters.      She had been recently seen on 3/27/25 at the [facility] visit following recent admission for AHRF 2/2 ADHF and COPD exacerbation. On ED presentation BNP elevated to 2500, troponin elevated to 19, CXR with pulmonary edema. She was started on steroids, nebs and antibiotics in addition to IV diuresis. Updated TTE stable from prior, showing HFpEF with elevated CVP 15. Her O2 requirements improved to baseline with diuresis and she was subsequently discharged. At that visit patient had not required midodrine as blood pressure had remained stable and she was asymptomatic.      Off note, patient was taken off OAC due to bleeding issues and no recurrent of typical atrial flutter after ablation.      Overview/Hospital Course:  Admitted to CCU for CHB in s/o hyperkalemia & in the presence of conduction disease/RBBB & LAHB - stable w/ temporary wire. ??Septic shock deemed to be d/t UTI - ?? UA with only 7 WBCs); vasopressors weaned off. HTN; started NG gtt. Acute on chronic hypoxic/hypercapnic RF & obtunded; intubated. Successfully extubated 04/08. MARQUEZ on CKD; improved w/ good UOP response to diuresis. PVCs w/ intermittent pacing by TVP. Switched pip-tazo to amox-clav on 04/09; continued to improve; empiric EED 04/13 for 7 day course. Micra PM by EP placed 04/10. Medically stable for stepdown. Patient was extremely short of breath with physical therapy, chest x-ray concerning for pulmonary edema.  Started patient on IV diuresis, now euvolemic.  ENT was consulted yesterday due to hoarseness of voice.   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 Pt uninterested in any procedure at this time, ENT recommended SLP follow up.  Able to tolerate regular diet at this point.  Now agreeable for SNF, working on placement. Course c/b worsening hypoxia & hyperNa.       Overnight 4/18-4/19, decompensated with recurrent sepsis, hypothermia 92, possible PNA with infiltrates on CXR, Zosyn IV started. Remained hypothermic despite Zosyn x 2 doses and Jeffrey hugger in place (pt uncomfortable/hot and asking for removal), so added Vanc IV STAT. Checked TSH and FT4-  wnl. UA with pyuria however not a clean catch w 33 sq epi, so repeated UA. C/o "stomach" pain, overall picture with the "stomach" pain and septic picture is "exactly what led us to ICU 2 weeks ago" per daughter. +TTP RUQ, RLQ, LLQ.   Poor UOP, only 200cc by mid-shift, and still hyperNa with FWD 1.8L so started gentle hydration with D5W @ 75cc for 24 hrs. May need to diurese again after sepsis resolves. CT C/A/P noncon with pulm edema, B pleural effusions, possible PNA, no intra-abdominal process seen.      On 4/21, Mental status much improved, however still with mild confusion and feels very tired.   decompensated further with recurrent MARQUEZ, C/o new SOB and hypoxemic 90% on 4L (over her baseline of 2L at home), improved to >94% on 6L with improvement of SOB.  C/o new L chest pressure/pain 4/10 (non-radiating), c/f MI or PE, resolved after nitro SL x 1 however HypoT with SBP upper 80s so will avoid further nitro.  HS trop 38 (decreased from prior), BNP 700s (increased from prior).CXR with pulm edema, all most c/w ADHF- Lasix 40 IV x 1 given.    Concern for untreated sepsis with ongoing hypothermia requiring jeffrey hugger despite Vanc/Zosyn for over 24 hours, so broadened to Vanc/Sher/Dinah, consulted ID, repeated BCx x 2. Lactate wnl.  Re: concern for PE, Known LIJ DVT, apixaban x several days then stopped (likely for hemorrhage of " "vocal fold)- discussed w ENT 4/20, ok to resume anticoag if needed. PE workup- F/u STAT UE and LE Dopplers. Unable to check CTA chest given MARQUEZ, unable to check V/Q given pulm edema. Anticoagulation with Apixaban.  Hypothermia returned however pt looks and feels the best she has in 4 days.  Voice is getting stronger.  Sitting in chair.  UCx resulted with ESBL Klebsiella, so Sher has been good coverage.  ID changed to Dapto (from Vanc), continuing Sher, for coverage of UTI and well as PNA.  BCx have all been negative.  Continuing Eliquis for DVt LUE and presumed PE.  Acute hypox resp failure 2/2 ADHF and presumed PE, still on 6L, dosing Lasix IV PRN daily given yet-unclear status of her sepsis and avoiding volume depletion in sepsis.      Prior to admit, was independent in her residence with her cat.  Two daughters very supportive.         Interval History: Pt seen and examined this morning on rounds. Hypothermia ongoing this morning, required Manny hugger.  Resolved in afternoon.  If hypothermia returns overnight, will repeat BCx x 2.  Attempting sputum sample for Cx as well, not optimistic for any yield. NM Full body tagged WBC scan per ID recs- will get injection tomorrow 4/24 then scan on 4/25.      Re-dosed Lasix at higher dose 80mg IV x 1, re-eval again tomorrow.      Pall care consulted, NOT for hospice, NOT due to a downward clinical trajectory, as pt shows signs of clinical improvement except for the hypothermia -- consult is rather for continuity and continuation of ACP/GOC started as outpt prior to admission.       Care plan reviewed. Otherwise, doing well and with no further complaints at this time."        Hospital Course:  No notes on file    Interval History: No acute events overnight. More alert today; eating small amounts    Raspy dysphonic voice    No complaints of pain, nausea, anxiety, or dyspnea    Tells me she has come to terms with her condition; looking  forward to what is next    Past Medical " History:   Diagnosis Date    Acute biliary pancreatitis 07/27/2024    Acute pancreatitis 07/27/2024    Acute pulmonary embolism 4/21/2025    Anemia 07/12/2024    Aortic atherosclerosis     Arthritis     knee joint pain    Asthma-COPD overlap syndrome 08/22/2022    home o2    Breast cancer 2002    left breast & lymph nodes-s/p sx with chemo    Cataracts, bilateral     Chronic diastolic heart failure 12/24/2019    Chronic kidney disease, stage 3     Class 1 obesity due to excess calories with serious comorbidity and body mass index (BMI) of 30.0 to 30.9 in adult 05/16/2024    Gram-negative bacteremia - Pasturella multocida 07/12/2024    History of chemotherapy     last treatment 12/2002 (had 8 treatments)    Hyperlipidemia 11/20/2016    Hypertension     Lymphedema of both lower extremities 01/25/2022    Nephrolithiasis 06/02/2014    Osteoporosis     Paroxysmal atrial fibrillation 06/07/2021    Renal osteodystrophy 01/14/2016    Sepsis 4/19/2025    Severe protein-calorie malnutrition 4/23/2025    Shock 04/06/2025    Subclinical hypothyroidism 04/06/2025    Venous stasis dermatitis of both lower extremities 01/25/2022    Vitamin D insufficiency        Past Surgical History:   Procedure Laterality Date    ABLATION, ATRIAL FLUTTER, TYPICAL N/A 6/17/2024    Procedure: Ablation, Atrial Flutter, Typical;  Surgeon: Taz Church MD;  Location: [facility] EP LAB;  Service: Cardiology;  Laterality: N/A;  AFL, RFA, LORENE, MAKAYLA (cx if SR), anes, MB, 3 Prep    BREAST BIOPSY Left 2002    core bx, +    BREAST BIOPSY Right 2018    core    BREAST BIOPSY Right 2019    BREAST LUMPECTOMY Left 2002    CYSTOSCOPY  4/28/2022    Procedure: CYSTOSCOPY;  Surgeon: Vik Ware MD;  Location: [facility] OR [address] [place] [address];  Service: Urology;;    CYSTOSCOPY  5/30/2022    Procedure: CYSTOSCOPY;  Surgeon: Bonnie Knutson MD;  Location: [facility] OR [address] [place] [address];  Service: Urology;;    ECHOCARDIOGRAM,TRANSESOPHAGEAL N/A 6/17/2024    Procedure: Transesophageal  echo (MAKAYLA) intra-procedure log documentation;  Surgeon: Nestor Martinez MD;  Location: [facility] EP LAB;  Service: Cardiology;  Laterality: N/A;    ERCP N/A 7/30/2024    Procedure: ERCP (ENDOSCOPIC RETROGRADE CHOLANGIOPANCREATOGRAPHY);  Surgeon: Sierra Cotto MD;  Location: [facility] ENDO (2ND FLR);  Service: Endoscopy;  Laterality: N/A;    IMPLANTATION OF LEADLESS PACEMAKER N/A 4/10/2025    Procedure: INSERTION, CARDIAC PACEMAKER, LEADLESS;  Surgeon: CAROLINE Solis MD;  Location: [facility] EP LAB;  Service: Cardiology;  Laterality: N/A;  CHB, MICRA leadless PPM, MDT, Anes, EH, CICU 3073    LASER LITHOTRIPSY  5/30/2022    Procedure: LITHOTRIPSY, USING LASER;  Surgeon: Bonnie Knutson MD;  Location: [facility] OR 1ST FLR;  Service: Urology;;    LITHOTRIPSY      MASTECTOMY Left 06/2002    left-& lymph node dissection    REMOVAL, PACEMAKER, TEMPORARY TRANSVENOUS  4/10/2025    Procedure: Removal, Pacemaker, Temporary Transvenous;  Surgeon: CAROLINE Solis MD;  Location: [facility] EP LAB;  Service: Cardiology;;    REPLACEMENT OF STENT Right 5/30/2022    Procedure: REPLACEMENT, STENT;  Surgeon: Bonnie Knutson MD;  Location: [facility] OR 1ST FLR;  Service: Urology;  Laterality: Right;    RETROGRADE PYELOGRAPHY Right 4/28/2022    Procedure: PYELOGRAM, RETROGRADE;  Surgeon: Vik Ware MD;  Location: [facility] OR 1ST FLR;  Service: Urology;  Laterality: Right;    ROBOT-ASSISTED LAPAROSCOPIC PARTIAL NEPHRECTOMY USING DA JESÚS XI Right 3/11/2021    Procedure: XI ROBOTIC NEPHRECTOMY, PARTIAL;  Surgeon: Taz Ortega MD;  Location: [facility] OR 2ND FLR;  Service: Urology;  Laterality: Right;  4hr/ gen with regional  [facility] confirmation:  [id number] for 11:15am case NC    URETEROSCOPIC REMOVAL OF URETERIC CALCULUS Right 5/30/2022    Procedure: REMOVAL, CALCULUS, URETER, URETEROSCOPIC;  Surgeon: Bonnie Knutson MD;  Location: [facility] OR 1ST FLR;  Service: Urology;  Laterality: Right;    ureteroscopy Bilateral     6.14     URETEROSCOPY Right 5/30/2022    Procedure: URETEROSCOPY;  Surgeon: Bonnie Knutson MD;  Location: [facility] OR [address] [place] [address];  Service: Urology;  Laterality: Right;       Review of patient's allergies indicates:   Allergen Reactions    Adhesive tape-silicones     Adhesive Rash     Pt states she removed a LATEX bandaid and the skin beneath was swollen and red. No other latex causes a reaction.       Medications:  Continuous Infusions:  Scheduled Meds:   apixaban  5 mg Oral BID    levothyroxine  50 mcg Oral Before breakfast    LIDOcaine  1 patch Transdermal Q24H    metoprolol tartrate  12.5 mg Oral BID     PRN Meds:  Current Facility-Administered Medications:     acetaminophen, 650 mg, Oral, Q6H PRN    HYDROmorphone, 0.2 mg, Intravenous, Q4H PRN    lorazepam, 0.25 mg, Intravenous, Q30 Min PRN    nitroGLYCERIN, 0.4 mg, Sublingual, Q5 Min PRN    ondansetron, 8 mg, Oral, Q8H PRN    polyethylene glycol, 17 g, Oral, BID PRN    senna, 8.6 mg, Oral, Daily PRN    simethicone, 1 tablet, Oral, TID PRN    Family History       Problem Relation (Age of Onset)    Arthritis Mother, Sister    Bone cancer Mother    Breast cancer Mother    Cancer Father    Crohn's disease Daughter    Fibroids Daughter    No Known Problems Brother, Daughter          Tobacco Use    Smoking status: Former     Current packs/day: 0.00     Average packs/day: 1 pack/day for 50.0 years (50.0 ttl pk-yrs)     Types: Cigarettes     Start date: 1960     Quit date: 5/20/2009     Years since quitting: 15.9    Smokeless tobacco: Former   Substance and Sexual Activity    Alcohol use: No    Drug use: No    Sexual activity: Not Currently     Partners: Male     Birth control/protection: Partner-Vasectomy       Review of Systems   Constitutional:  Positive for activity change and fatigue.   HENT:  Positive for voice change.    Respiratory:  Positive for shortness of breath.    Cardiovascular:  Positive for leg swelling.   Gastrointestinal:  Positive for nausea.    Neurological:  Positive for weakness.   Hematological:  Bruises/bleeds easily.     Objective:     Vital Signs (Most Recent):  Temp: 98.6 °F (37 °C) (05/01/25 0715)  Pulse: 71 (05/01/25 0849)  Resp: 18 (05/01/25 0849)  BP: 126/73 (05/01/25 0849)  SpO2: (!) 94 % (05/01/25 0849) Vital Signs (24h Range):  Temp:  [97.4 °F (36.3 °C)-98.6 °F (37 °C)] 98.6 °F (37 °C)  Pulse:  [68-71] 71  Resp:  [18] 18  SpO2:  [94 %-95 %] 94 %  BP: (120-126)/(58-73) 126/73     Weight: 70.5 kg (155 lb 6.8 oz)  Body mass index is 26.68 kg/m².       Physical Exam  Vitals and nursing note reviewed.   Constitutional:       General: She is not in acute distress.     Appearance: She is ill-appearing.   HENT:      Head: Normocephalic and atraumatic.      Nose: Nose normal.      Mouth/Throat:      Mouth: Mucous membranes are dry.   Eyes:      Extraocular Movements: Extraocular movements intact.   Cardiovascular:      Rate and Rhythm: Normal rate.   Pulmonary:      Effort: Pulmonary effort is normal.   Skin:     General: Skin is warm and dry.   Neurological:      Mental Status: She is alert and oriented to person, place, and time.      Motor: Weakness present.   Psychiatric:         Mood and Affect: Mood normal.         Behavior: Behavior normal.         Thought Content: Thought content normal.         Judgment: Judgment normal.            Review of Symptoms      Symptom Assessment (ESAS 0-10 Scale)  Pain:  0  Dyspnea:  0  Anxiety:  0  Nausea:  0  Depression:  0  Anorexia:  0  Fatigue:  0  Insomnia:  0  Restlessness:  0  Agitation:  0     CAM / Delirium:  Negative  Constipation:  Negative  Diarrhea:  Negative      Modified Berto Scale:  1    Performance Status:  50    Psychosocial/Cultural:   See Palliative Psychosocial Note: Yes  - Previously , now .  - Prior to patient's hospitalizations, she was living alone. Daughter lived in neighborhood and assisted patient with cleaning and cooking  - Over the last year, patient/daughters report  "patient has spent more time in the hospital/rehab than at home.   - Patient states he enjoys sitting on her porch, gardening in her garden pots, and spending time with her tabby cat (male, Romeo)   - Patient's primary support system includes her 2 daughters, Sirisha and Esther.  - previously enjoyed driving, shopping at [employer], [employer], and [place], but states it has been a while since she has been able to do all the things she enjoys  **Primary  to Follow**  Palliative Care  Consult: Yes    Spiritual:  F - Megan and Belief:  Amish        Advance Care Planning  Advance Directives:   Living Will: Yes        Copy on chart: Yes    LaPOST: No    Do Not Resuscitate Status: Yes    Medical Power of : Yes    Agent's Name:  Esther Barajas   Agent's Contact Number:  [phone]    Decision Making:  Patient answered questions and Family answered questions  Goals of Care: The patient endorses that what is most important right now is to focus on symptom/pain control, quality of life, even if it means sacrificing a little time, and comfort and QOL     Accordingly, we have decided that the best plan to meet the patient's goals includes enrolling in hospice care and pivot to comfort-focused care           Significant Labs: All pertinent labs within the past 24 hours have been reviewed.  CBC:   Recent Labs   Lab 04/29/25  0526   WBC 6.38   HGB 11.5*   HCT 37.3   MCV 92        BMP:  No results for input(s): "GLU", "NA", "K", "CL", "CO2", "BUN", "CREATININE", "CALCIUM", "MG" in the last 24 hours.    LFT:  Lab Results   Component Value Date    AST 47 (H) 04/29/2025    ALKPHOS 107 04/29/2025    BILITOT 0.5 04/29/2025     Albumin:   Albumin   Date Value Ref Range Status   04/29/2025 2.2 (L) 3.5 - 5.2 g/dL Final   02/18/2025 2.8 (L) 3.5 - 5.2 g/dL Final     Protein:   Protein Total   Date Value Ref Range Status   04/29/2025 5.4 (L) 6.0 - 8.4 gm/dL Final     Total Protein   Date " Value Ref Range Status   02/18/2025 6.0 6.0 - 8.4 g/dL Final     Lactic acid:   Lab Results   Component Value Date    LACTATE 1.6 04/21/2025    LACTATE 0.8 04/20/2025       Significant Imaging: I have reviewed all pertinent imaging results/findings within the past 24 hours.      WBC Whole Body Scan 4/25/25  Impression:     The mild diffuse increased uptake around the left knee joint likely related to infectious/ inflammatory arthritis.  No other foci of abnormal increased uptake to suggest other source of infection    Aquilino Mayo MD  Palliative Medicine  [facility] - Hosp[facility] and Palliative Medicine

## 2025-05-01 NOTE — ASSESSMENT & PLAN NOTE
Impression:  Misa Barajas is a 79 y.o. female with PMHx of COPD on 2L home O2, HFpEF, AFL s/p RFA (6/17/24 w/ Dr. Church), CKD III, HTN, HLD, chronic venous insufficiency, orthostatic hypotension who was brought via EMS after per history she was found confused and bradycardiac with HR in the 20s. Patient was percutaneously paced. During hospitalization, was intubated then extubated with residual hoarseness of voice (ENT following), required intermittent pacing by TVP, a Micra PM by EP placed 04/10, aggressive diuresing, recurrent sepsis and hypothermia, patient has required several trials of antibiotics, anticipating WBC scan on 4/25 (ID following), and presumed PE (unable to verify with CTA secondary to MARQUEZ).      Of note, patient has had 7x ED visits in the last year. Patient has engaged in goals of care discussions with Dr. Velasquez of Palliative Medicine for goals of care and symptom management. Palliative Medicine was consulted by primary, Dr. Antunez, for goals of care and advanced care planning discussions.    Patient is currently resting in bed, AAOx4, daughters (Sirisha and Esther) at bedside. Patient and family are amenable to Pal Med. Pal Med APRN and LCSW at bedside for goals of care discussions.     Transferred to [facility] on 4/29 after transitioning to comfort focused care.    - Prior experience with serious illness: yes  -The patient has previously engaged in advance care planning or GOC discussions  - Insight/Understanding of illness: yes      Advance Care Planning     Date: 04/29/2025  - Family meeting at bedside with Esther Grimm, Dr. Antunez, and HAFSA Hdz DNP.   - Philosophies of hospice reviewed.   - Patient and family amenable to pivoting to comfort-focus care and transferring to [facility].  - Case discussed with [facility] Attending, Dr. Mayo and case management team.   - Transfer orders placed. Elyssa notified via telephone.     Date: 04/28/2025  - Patient resting in bed, 4L O2, jeffrey hugger warming  "blanket in place, no acute distress upon my visit. Patient's daughters arrive at bedside shortly after.   - Daughters requested we meet outside of room so that we do not disturb patient's rest. I met with patient's daughter in 3rd floor Visitor's Garden.  - We discussed, patient's recent hospital encounters, current hospitalization, and patient's progressive health decline.   - Daughters acknowledge patient's journey in/out of hospital for past year has been tiring and exhausting and are interested in exploring hospice options. Sirisha states she has been trying to juggle working and caring for her mom after her work day, but is limited in the care that she gives her mom because she has no past medical background. Esther states she has been fortunate to be able to work from home to be with her mom more often. Both daughters acknowledged the emotional strain caring for their mom has caused. Daughters state when something "goes wrong", they try to do as much as they can (limited to checking a blood pressure, O2 sats, and giving a dose of zofran). Once they have exhausted those interventions, they call 911 because they know they are "in over their heads" to get additional help. Patient is then admitted to hospital for additional workup. Sirisha states that it has been so traumatizing when she sees her mom is sick, tries to do everything she can to care for her mom, notices it is not working, and hears her mom begging for help, but refuses to go to the hospital. This has created immense stress on the daughters. Patient never wants to go to the hospital, but eventually agrees to come to ED because either daughters convince her or because patient becomes too sick to independently make a decision. Sirisha states she is worried that she is "giving up" on her mom even though patient has been "fighting" for so long. Philosophies of hospice introduced and discussed. Esther states she has been caring for and thinking about how her " "mom has progressively declined over the last year and expresses desires to prioritize patient's comfort knowing that with comfort being the priority, life will be limited. Daughters acknowledged patient wants to go home, but state they do not have the resources to continue to care for the patient at home. Daughters inquired about inpatient hospice unit, discussed in-patient hospice criteria. During conversation, daughters became appropriately tearful. Emotional support provided. Daughters asked for additional time to process steps forward and where the patient would continued to be cared for.   - After speaking with the daughters, I rounded on the patient again. Patient was awake and amenable to Pal Med. Patient acknowledges her health decline and states it has been taxing and tiring as she has had multiple hospitalization, including the current encounter. Patient agrees that her comfort has not been prioritized over the last year and if open to talking more about hospice care. Philosophies of hospice were introduced and discussed. Patient is accepting, and states she would like to go home. I informed patient that daughters expressed concerns being able to care for patient and would like to talk more about facility placement/in-patient hospice unit. Patient expressed that she was overwhelmed with the conversation and describes it as "a lot." Patient acknowledges she was not expecting to be having this [hospice] conversation so soon, but understands that we need to have it because she has not had much quality of life lately. Patient states she would like additional time to talk with her daughters.   - Tentative family meeting 4/29/30 at 0900. Primary team, Dr. Antunez, updated via secure chat.    Date: 04/25/2025  - No acute events overnight. Patient anticipates NM Inflammatory Whole Body scan today.  - Revisited goals of care discussion from yesterday. Patient acknowledged it was a difficult conversation to have, but " has been open to it. As we anticipate more information from the pending studies, goals of care discussions are also pending. Patient expressed concerns regarding nuclear scan and when it will be getting done. Patient states she thought it would have been done by now. I followed up with radiology to inquire about scan time, and updated patient on the 1-3pm time frame that radiology hopes to see patient. Patient's daughter Esther was at bedside and updated.   - Patient with no needs at this time.  - Primary team updated.        Date: 04/24/2025    Code Status  In light of the patients advanced and life limiting illness, I engaged the patient and family in a voluntary conversation about the patient's preferences for care  at the very end of life. The patient wishes to have a natural, peaceful death.  Along those lines, the patient does not wish to have CPR or other invasive treatments performed when her heart and/or breathing stops. I communicated to the patient and family that a DNR order would be placed in her medical record to reflect this preference.        [facility]  I engaged the patient and family in a voluntary conversation about advance care planning and we specifically addressed what the goals of care would be moving forward, in light of the patient's change in clinical status, specifically recurrent hospitalizations over past year, hypothermia, acute renal failure, CHF exacerbation, and current hosptialization.  We did specifically address the patient's likely prognosis, which  appears to be poor .  We explored the patient's values and preferences for future care.  The patient and family endorses that what is most important right now is to focus on spending time at home, avoiding the hospital, quality of life, even if it means sacrificing a little time, and allowing additional time for infectious workup    Accordingly, we have decided that the best plan to meet the patient's goals includes continuing with  treatment and continuing goals of care discussions pending infectious workup               Life Limiting Diagnosis  Acute on Chronic CHF  Acute Renal Failure on CKD stage 3b  Complete Heart Block    Symptom Management  - Pain: no  - acetaminophen 650mg q6h PRN    - Dyspnea: no  - on 2L at home  - maintained on 4L O2    - Constipation: no  - senna-docusate daily PRN  - poluethylene glycol daily PRN    - Nausea: no  - ondansetron 8mg q8h    - Debility: yes  - Functional status: fair.  Pt was not able to manage ADLs  - Fall precautions  - PT/OT recommending moderate intensity therapy          Plan:   - Code status: DNR  - Disposition: working for NH with hospice; family exploring options  - comfort focused care plan

## 2025-05-01 NOTE — PLAN OF CARE
Problem: Adult Inpatient Plan of Care  Goal: Absence of Hospital-Acquired Illness or Injury  Outcome: Progressing  Goal: Readiness for Transition of Care  Outcome: Progressing     Problem: Delirium  Goal: Optimal Coping  Outcome: Progressing

## 2025-05-01 NOTE — SUBJECTIVE & OBJECTIVE
Interval History: No acute events overnight. More alert today; eating small amounts    Raspy dysphonic voice    No complaints of pain, nausea, anxiety, or dyspnea    Tells me she has come to terms with her condition; looking  forward to what is next    Past Medical History:   Diagnosis Date    Acute biliary pancreatitis 07/27/2024    Acute pancreatitis 07/27/2024    Acute pulmonary embolism 4/21/2025    Anemia 07/12/2024    Aortic atherosclerosis     Arthritis     knee joint pain    Asthma-COPD overlap syndrome 08/22/2022    home o2    Breast cancer 2002    left breast & lymph nodes-s/p sx with chemo    Cataracts, bilateral     Chronic diastolic heart failure 12/24/2019    Chronic kidney disease, stage 3     Class 1 obesity due to excess calories with serious comorbidity and body mass index (BMI) of 30.0 to 30.9 in adult 05/16/2024    Gram-negative bacteremia - Pasturella multocida 07/12/2024    History of chemotherapy     last treatment 12/2002 (had 8 treatments)    Hyperlipidemia 11/20/2016    Hypertension     Lymphedema of both lower extremities 01/25/2022    Nephrolithiasis 06/02/2014    Osteoporosis     Paroxysmal atrial fibrillation 06/07/2021    Renal osteodystrophy 01/14/2016    Sepsis 4/19/2025    Severe protein-calorie malnutrition 4/23/2025    Shock 04/06/2025    Subclinical hypothyroidism 04/06/2025    Venous stasis dermatitis of both lower extremities 01/25/2022    Vitamin D insufficiency        Past Surgical History:   Procedure Laterality Date    ABLATION, ATRIAL FLUTTER, TYPICAL N/A 6/17/2024    Procedure: Ablation, Atrial Flutter, Typical;  Surgeon: Taz Church MD;  Location: [facility];  Service: Cardiology;  Laterality: N/A;  AFL, RFA, LORENE, MAKAYLA (cx if SR), LEANNE miller, 3 Prep    BREAST BIOPSY Left 2002    core bx, +    BREAST BIOPSY Right 2018    core    BREAST BIOPSY Right 2019    BREAST LUMPECTOMY Left 2002    CYSTOSCOPY  4/28/2022    Procedure: CYSTOSCOPY;  Surgeon: Vik Ware,  MD;  Location: [facility] OR 1ST FLR;  Service: Urology;;    CYSTOSCOPY  5/30/2022    Procedure: CYSTOSCOPY;  Surgeon: Bonnie Knutson MD;  Location: [facility] OR 1ST FLR;  Service: Urology;;    ECHOCARDIOGRAM,TRANSESOPHAGEAL N/A 6/17/2024    Procedure: Transesophageal echo (MAKAYLA) intra-procedure log documentation;  Surgeon: Nestor Martinez MD;  Location: [facility] EP LAB;  Service: Cardiology;  Laterality: N/A;    ERCP N/A 7/30/2024    Procedure: ERCP (ENDOSCOPIC RETROGRADE CHOLANGIOPANCREATOGRAPHY);  Surgeon: Sierra Cotto MD;  Location: [facility] ENDO (2ND FLR);  Service: Endoscopy;  Laterality: N/A;    IMPLANTATION OF LEADLESS PACEMAKER N/A 4/10/2025    Procedure: INSERTION, CARDIAC PACEMAKER, LEADLESS;  Surgeon: CAROLINE Solis MD;  Location: [facility] EP LAB;  Service: Cardiology;  Laterality: N/A;  CHB, MICRA leadless PPM, MDT, Anes, EH, CICU [id number]    LASER LITHOTRIPSY  5/30/2022    Procedure: LITHOTRIPSY, USING LASER;  Surgeon: Bonnie Knutson MD;  [facility];  Service: Urology;;    LITHOTRIPSY      MASTECTOMY Left 06/2002    left-& lymph node dissection    REMOVAL, PACEMAKER, TEMPORARY TRANSVENOUS  4/10/2025    Procedure: Removal, Pacemaker, Temporary Transvenous;  Surgeon: CAROLINE Solis MD;  Location: [facility] EP LAB;  Service: Cardiology;;    REPLACEMENT OF STENT Right 5/30/2022    Procedure: REPLACEMENT, STENT;  Surgeon: Bonnie Knutson MD;  [facility];  Service: Urology;  Laterality: Right;    RETROGRADE PYELOGRAPHY Right 4/28/2022    Procedure: PYELOGRAM, RETROGRADE;  Surgeon: Vik Ware MD;  Location: [facility] OR 1ST FLR;  Service: Urology;  Laterality: Right;    ROBOT-ASSISTED LAPAROSCOPIC PARTIAL NEPHRECTOMY USING DA JESÚS XI Right 3/11/2021    Procedure: XI ROBOTIC NEPHRECTOMY, PARTIAL;  Surgeon: Taz Ortega MD;  Location: [facility] OR 2ND FLR;  Service: Urology;  Laterality: Right;  4hr/ gen with regional  [place] confirmation:  [id number] for 11:15am case NC     URETEROSCOPIC REMOVAL OF URETERIC CALCULUS Right 5/30/2022    Procedure: REMOVAL, CALCULUS, URETER, URETEROSCOPIC;  Surgeon: Bonnie Knutson MD;  Location: [facility] OR [address] [place] [address];  Service: Urology;  Laterality: Right;    ureteroscopy Bilateral     6.14    URETEROSCOPY Right 5/30/2022    Procedure: URETEROSCOPY;  Surgeon: Bonnie Knutson MD;  Location: [facility] OR [address] [place] [address];  Service: Urology;  Laterality: Right;       Review of patient's allergies indicates:   Allergen Reactions    Adhesive tape-silicones     Adhesive Rash     Pt states she removed a LATEX bandaid and the skin beneath was swollen and red. No other latex causes a reaction.       Medications:  Continuous Infusions:  Scheduled Meds:   apixaban  5 mg Oral BID    levothyroxine  50 mcg Oral Before breakfast    LIDOcaine  1 patch Transdermal Q24H    metoprolol tartrate  12.5 mg Oral BID     PRN Meds:  Current Facility-Administered Medications:     acetaminophen, 650 mg, Oral, Q6H PRN    HYDROmorphone, 0.2 mg, Intravenous, Q4H PRN    lorazepam, 0.25 mg, Intravenous, Q30 Min PRN    nitroGLYCERIN, 0.4 mg, Sublingual, Q5 Min PRN    ondansetron, 8 mg, Oral, Q8H PRN    polyethylene glycol, 17 g, Oral, BID PRN    senna, 8.6 mg, Oral, Daily PRN    simethicone, 1 tablet, Oral, TID PRN    Family History       Problem Relation (Age of Onset)    Arthritis Mother, Sister    Bone cancer Mother    Breast cancer Mother    Cancer Father    Crohn's disease Daughter    Fibroids Daughter    No Known Problems Brother, Daughter          Tobacco Use    Smoking status: Former     Current packs/day: 0.00     Average packs/day: 1 pack/day for 50.0 years (50.0 ttl pk-yrs)     Types: Cigarettes     Start date: 1960     Quit date: 5/20/2009     Years since quitting: 15.9    Smokeless tobacco: Former   Substance and Sexual Activity    Alcohol use: No    Drug use: No    Sexual activity: Not Currently     Partners: Male     Birth control/protection: Partner-Vasectomy       Review of  Systems   Constitutional:  Positive for activity change and fatigue.   HENT:  Positive for voice change.    Respiratory:  Positive for shortness of breath.    Cardiovascular:  Positive for leg swelling.   Gastrointestinal:  Positive for nausea.   Neurological:  Positive for weakness.   Hematological:  Bruises/bleeds easily.     Objective:     Vital Signs (Most Recent):  Temp: 98.6 °F (37 °C) (05/01/25 0715)  Pulse: 71 (05/01/25 0849)  Resp: 18 (05/01/25 0849)  BP: 126/73 (05/01/25 0849)  SpO2: (!) 94 % (05/01/25 0849) Vital Signs (24h Range):  Temp:  [97.4 °F (36.3 °C)-98.6 °F (37 °C)] 98.6 °F (37 °C)  Pulse:  [68-71] 71  Resp:  [18] 18  SpO2:  [94 %-95 %] 94 %  BP: (120-126)/(58-73) 126/73     Weight: 70.5 kg (155 lb 6.8 oz)  Body mass index is 26.68 kg/m².       Physical Exam  Vitals and nursing note reviewed.   Constitutional:       General: She is not in acute distress.     Appearance: She is ill-appearing.   HENT:      Head: Normocephalic and atraumatic.      Nose: Nose normal.      Mouth/Throat:      Mouth: Mucous membranes are dry.   Eyes:      Extraocular Movements: Extraocular movements intact.   Cardiovascular:      Rate and Rhythm: Normal rate.   Pulmonary:      Effort: Pulmonary effort is normal.   Skin:     General: Skin is warm and dry.   Neurological:      Mental Status: She is alert and oriented to person, place, and time.      Motor: Weakness present.   Psychiatric:         Mood and Affect: Mood normal.         Behavior: Behavior normal.         Thought Content: Thought content normal.         Judgment: Judgment normal.            Review of Symptoms      Symptom Assessment (ESAS 0-10 Scale)  Pain:  0  Dyspnea:  0  Anxiety:  0  Nausea:  0  Depression:  0  Anorexia:  0  Fatigue:  0  Insomnia:  0  Restlessness:  0  Agitation:  0     CAM / Delirium:  Negative  Constipation:  Negative  Diarrhea:  Negative      Modified Berto Scale:  1    Performance Status:  50    Psychosocial/Cultural:   See Palliative  "Psychosocial Note: Yes  - Previously , now .  - Prior to patient's hospitalizations, she was living alone. Daughter lived in neighborhood and assisted patient with cleaning and cooking  - Over the last year, patient/daughters report patient has spent more time in the hospital/rehab than at home.   - Patient states he enjoys sitting on her porch, gardening in her garden pots, and spending time with her tabby cat (male, Romeo)   - Patient's primary support system includes her 2 daughters, Sirisha and Esther.  - previously enjoyed driving, shopping at [employer], [employer], and [employer], but states it has been a while since she has been able to do all the things she enjoys  **Primary  to Follow**  Palliative Care  Consult: Yes    Spiritual:  F - Megan and Belief:  Baptism        Advance Care Planning   Advance Directives:   Living Will: Yes        Copy on chart: Yes    LaPOST: No    Do Not Resuscitate Status: Yes    Medical Power of : Yes    Agent's Name:  Esther Barajas   Agent's Contact Number:  [phone]    Decision Making:  Patient answered questions and Family answered questions  Goals of Care: The patient endorses that what is most important right now is to focus on symptom/pain control, quality of life, even if it means sacrificing a little time, and comfort and QOL     Accordingly, we have decided that the best plan to meet the patient's goals includes enrolling in hospice care and pivot to comfort-focused care           Significant Labs: All pertinent labs within the past 24 hours have been reviewed.  CBC:   Recent Labs   Lab 04/29/25  0526   WBC 6.38   HGB 11.5*   HCT 37.3   MCV 92        BMP:  No results for input(s): "GLU", "NA", "K", "CL", "CO2", "BUN", "CREATININE", "CALCIUM", "MG" in the last 24 hours.    LFT:  Lab Results   Component Value Date    AST 47 (H) 04/29/2025    ALKPHOS 107 04/29/2025    BILITOT 0.5 04/29/2025     Albumin: "   Albumin   Date Value Ref Range Status   04/29/2025 2.2 (L) 3.5 - 5.2 g/dL Final   02/18/2025 2.8 (L) 3.5 - 5.2 g/dL Final     Protein:   Protein Total   Date Value Ref Range Status   04/29/2025 5.4 (L) 6.0 - 8.4 gm/dL Final     Total Protein   Date Value Ref Range Status   02/18/2025 6.0 6.0 - 8.4 g/dL Final     Lactic acid:   Lab Results   Component Value Date    LACTATE 1.6 04/21/2025    LACTATE 0.8 04/20/2025       Significant Imaging: I have reviewed all pertinent imaging results/findings within the past 24 hours.      WBC Whole Body Scan 4/25/25  Impression:     The mild diffuse increased uptake around the left knee joint likely related to infectious/ inflammatory arthritis.  No other foci of abnormal increased uptake to suggest other source of infection

## 2025-05-02 ENCOUNTER — TELEPHONE (OUTPATIENT)
Dept: PALLIATIVE MEDICINE | Facility: CLINIC | Age: 80
End: 2025-05-02
Payer: MEDICARE

## 2025-05-02 NOTE — TELEPHONE ENCOUNTER
I reached out to the patient daughter Marina for the past 2 days at [phone] and her voicemail box is not set up. I am going to try and reach out to Marina through the portal.

## 2025-05-02 NOTE — SUBJECTIVE & OBJECTIVE
Interval History: No acute events overnight. ; more lethargic but intermittently interacting with family    Raspy dysphonic voice    No complaints of pain, nausea, anxiety, or dyspnea    Tells me she has come to terms with her condition; looking  forward to what is next    Daughters at bedside today    Past Medical History:   Diagnosis Date    Acute biliary pancreatitis 07/27/2024    Acute pancreatitis 07/27/2024    Acute pulmonary embolism 4/21/2025    Anemia 07/12/2024    Aortic atherosclerosis     Arthritis     knee joint pain    Asthma-COPD overlap syndrome 08/22/2022    home o2    Breast cancer 2002    left breast & lymph nodes-s/p sx with chemo    Cataracts, bilateral     Chronic diastolic heart failure 12/24/2019    Chronic kidney disease, stage 3     Class 1 obesity due to excess calories with serious comorbidity and body mass index (BMI) of 30.0 to 30.9 in adult 05/16/2024    Gram-negative bacteremia - Pasturella multocida 07/12/2024    History of chemotherapy     last treatment 12/2002 (had 8 treatments)    Hyperlipidemia 11/20/2016    Hypertension     Lymphedema of both lower extremities 01/25/2022    Nephrolithiasis 06/02/2014    Osteoporosis     Paroxysmal atrial fibrillation 06/07/2021    Renal osteodystrophy 01/14/2016    Sepsis 4/19/2025    Severe protein-calorie malnutrition 4/23/2025    Shock 04/06/2025    Subclinical hypothyroidism 04/06/2025    Venous stasis dermatitis of both lower extremities 01/25/2022    Vitamin D insufficiency        Past Surgical History:   Procedure Laterality Date    ABLATION, ATRIAL FLUTTER, TYPICAL N/A 6/17/2024    Procedure: Ablation, Atrial Flutter, Typical;  Surgeon: Taz Church MD;  Location: [facility] LAB;  Service: Cardiology;  Laterality: N/A;  AFL, RFA, LORENE, MAKAYLA (cx if SR), LEANNE miller, 3 Prep    BREAST BIOPSY Left 2002    core bx, +    BREAST BIOPSY Right 2018    core    BREAST BIOPSY Right 2019    BREAST LUMPECTOMY Left 2002    CYSTOSCOPY  4/28/2022     Procedure: CYSTOSCOPY;  Surgeon: iVk Ware MD;  [facility];  Service: Urology;;    CYSTOSCOPY  5/30/2022    Procedure: CYSTOSCOPY;  Surgeon: Bonnie Knutson MD;  [facility];  Service: Urology;;    ECHOCARDIOGRAM,TRANSESOPHAGEAL N/A 6/17/2024    Procedure: Transesophageal echo (MAKAYLA) intra-procedure log documentation;  Surgeon: Nestor Martinez MD;  Location: [facility] EP LAB;  Service: Cardiology;  Laterality: N/A;    ERCP N/A 7/30/2024    Procedure: ERCP (ENDOSCOPIC RETROGRADE CHOLANGIOPANCREATOGRAPHY);  Surgeon: Sierra Cotto MD;  Location: [facility] ENDO (2ND FLR);  Service: Endoscopy;  Laterality: N/A;    IMPLANTATION OF LEADLESS PACEMAKER N/A 4/10/2025    Procedure: INSERTION, CARDIAC PACEMAKER, LEADLESS;  Surgeon: CAROLINE Solis MD;  Location: [facility] EP LAB;  Service: Cardiology;  Laterality: N/A;  CHB, MICRA leadless PPM, MDT, Anes, EH, CICU 3073    LASER LITHOTRIPSY  5/30/2022    Procedure: LITHOTRIPSY, USING LASER;  Surgeon: Bonnie Knutson MD;  Location: [address];  Service: Urology;;    LITHOTRIPSY      MASTECTOMY Left 06/2002    left-& lymph node dissection    REMOVAL, PACEMAKER, TEMPORARY TRANSVENOUS  4/10/2025    Procedure: Removal, Pacemaker, Temporary Transvenous;  Surgeon: CAROLINE Solis MD;  Location: [facility] EP LAB;  Service: Cardiology;;    REPLACEMENT OF STENT Right 5/30/2022    Procedure: REPLACEMENT, STENT;  Surgeon: Bonnie Knutson MD;  [facility];  Service: Urology;  Laterality: Right;    RETROGRADE PYELOGRAPHY Right 4/28/2022    Procedure: PYELOGRAM, RETROGRADE;  Surgeon: Vik Ware MD;  [facility];  Service: Urology;  Laterality: Right;    ROBOT-ASSISTED LAPAROSCOPIC PARTIAL NEPHRECTOMY USING DA JESÚS XI Right 3/11/2021    Procedure: XI ROBOTIC NEPHRECTOMY, PARTIAL;  Surgeon: Taz Ortega MD;  Location: [facility] OR [address] [place] [address];  Service: Urology;  Laterality: Right;  4hr/ gen with  regional  [place] confirmation:  [id number] for 11:15am case NC    URETEROSCOPIC REMOVAL OF URETERIC CALCULUS Right 5/30/2022    Procedure: REMOVAL, CALCULUS, URETER, URETEROSCOPIC;  Surgeon: Bonine Knutson MD;  Location: [facility] OR [address] [place] [address];  Service: Urology;  Laterality: Right;    ureteroscopy Bilateral     6.14    URETEROSCOPY Right 5/30/2022    Procedure: URETEROSCOPY;  Surgeon: Bonnie Knutson MD;  Location: [facility] OR [address] [place] [address];  Service: Urology;  Laterality: Right;       Review of patient's allergies indicates:   Allergen Reactions    Adhesive tape-silicones     Adhesive Rash     Pt states she removed a LATEX bandaid and the skin beneath was swollen and red. No other latex causes a reaction.       Medications:  Continuous Infusions:  Scheduled Meds:   apixaban  5 mg Oral BID    levothyroxine  50 mcg Oral Before breakfast    LIDOcaine  1 patch Transdermal Q24H    metoprolol tartrate  12.5 mg Oral BID     PRN Meds:  Current Facility-Administered Medications:     acetaminophen, 650 mg, Oral, Q6H PRN    HYDROmorphone, 0.2 mg, Intravenous, Q4H PRN    lorazepam, 0.25 mg, Intravenous, Q30 Min PRN    nitroGLYCERIN, 0.4 mg, Sublingual, Q5 Min PRN    ondansetron, 8 mg, Oral, Q8H PRN    polyethylene glycol, 17 g, Oral, BID PRN    senna, 8.6 mg, Oral, Daily PRN    simethicone, 1 tablet, Oral, TID PRN    Family History       Problem Relation (Age of Onset)    Arthritis Mother, Sister    Bone cancer Mother    Breast cancer Mother    Cancer Father    Crohn's disease Daughter    Fibroids Daughter    No Known Problems Brother, Daughter          Tobacco Use    Smoking status: Former     Current packs/day: 0.00     Average packs/day: 1 pack/day for 50.0 years (50.0 ttl pk-yrs)     Types: Cigarettes     Start date: 1960     Quit date: 5/20/2009     Years since quitting: 15.9    Smokeless tobacco: Former   Substance and Sexual Activity    Alcohol use: No    Drug use: No    Sexual activity: Not Currently     Partners: Male      Birth control/protection: Partner-Vasectomy       Review of Systems   Constitutional:  Positive for activity change and fatigue.   HENT:  Positive for voice change.    Respiratory:  Positive for shortness of breath.    Cardiovascular:  Positive for leg swelling.   Gastrointestinal:  Positive for nausea.   Neurological:  Positive for weakness.   Hematological:  Bruises/bleeds easily.     Objective:     Vital Signs (Most Recent):  Temp: 97.3 °F (36.3 °C) (05/02/25 0745)  Pulse: 61 (05/02/25 0745)  Resp: 20 (05/02/25 0745)  BP: 130/70 (05/02/25 0745)  SpO2: 95 % (05/02/25 0745) Vital Signs (24h Range):  Temp:  [97 °F (36.1 °C)-97.3 °F (36.3 °C)] 97.3 °F (36.3 °C)  Pulse:  [61] 61  Resp:  [18-24] 20  SpO2:  [95 %-96 %] 95 %  BP: (129-130)/(70-80) 130/70     Weight: 70.5 kg (155 lb 6.8 oz)  Body mass index is 26.68 kg/m².       Physical Exam  Vitals and nursing note reviewed.   Constitutional:       General: She is not in acute distress.     Appearance: She is ill-appearing.   HENT:      Head: Normocephalic and atraumatic.      Nose: Nose normal.      Mouth/Throat:      Mouth: Mucous membranes are dry.   Eyes:      Extraocular Movements: Extraocular movements intact.   Cardiovascular:      Rate and Rhythm: Normal rate.   Pulmonary:      Effort: Pulmonary effort is normal.   Skin:     General: Skin is warm and dry.   Neurological:      Mental Status: She is alert and oriented to person, place, and time.      Motor: Weakness present.   Psychiatric:         Mood and Affect: Mood normal.         Behavior: Behavior normal.         Thought Content: Thought content normal.         Judgment: Judgment normal.            Review of Symptoms      Symptom Assessment (ESAS 0-10 Scale)  Pain:  0  Dyspnea:  0  Anxiety:  0  Nausea:  0  Depression:  0  Anorexia:  0  Fatigue:  0  Insomnia:  0  Restlessness:  0  Agitation:  0     CAM / Delirium:  Negative  Constipation:  Negative  Diarrhea:  Negative      Modified Berto Scale:   "1    Performance Status:  50    Psychosocial/Cultural:   See Palliative Psychosocial Note: Yes  - Previously , now .  - Prior to patient's hospitalizations, she was living alone. Daughter lived in neighborhood and assisted patient with cleaning and cooking  - Over the last year, patient/daughters report patient has spent more time in the hospital/rehab than at home.   - Patient states he enjoys sitting on her porch, gardening in her garden pots, and spending time with her tabby cat (male, Romeo)   - Patient's primary support system includes her 2 daughters, Sirisha and Esther.  - previously enjoyed driving, shopping at [employer], [employer], and [employer], but states it has been a while since she has been able to do all the things she enjoys  **Primary  to Follow**  Palliative Care  Consult: Yes    Spiritual:  F - Megan and Belief:  Yarsanism        Advance Care Planning   Advance Directives:   Living Will: Yes        Copy on chart: Yes    LaPOST: No    Do Not Resuscitate Status: Yes    Medical Power of : Yes    Agent's Name:  Esther Barajas   Agent's Contact Number:  [phone]    Decision Making:  Patient answered questions and Family answered questions  Goals of Care: The patient endorses that what is most important right now is to focus on symptom/pain control, quality of life, even if it means sacrificing a little time, and comfort and QOL     Accordingly, we have decided that the best plan to meet the patient's goals includes enrolling in hospice care and pivot to comfort-focused care           Significant Labs: All pertinent labs within the past 24 hours have been reviewed.  CBC:   Recent Labs   Lab 04/29/25  0526   WBC 6.38   HGB 11.5*   HCT 37.3   MCV 92        BMP:  No results for input(s): "GLU", "NA", "K", "CL", "CO2", "BUN", "CREATININE", "CALCIUM", "MG" in the last 24 hours.    LFT:  Lab Results   Component Value Date    AST 47 (H) 04/29/2025 "    ALKPHOS 107 04/29/2025    BILITOT 0.5 04/29/2025     Albumin:   Albumin   Date Value Ref Range Status   04/29/2025 2.2 (L) 3.5 - 5.2 g/dL Final   02/18/2025 2.8 (L) 3.5 - 5.2 g/dL Final     Protein:   Protein Total   Date Value Ref Range Status   04/29/2025 5.4 (L) 6.0 - 8.4 gm/dL Final     Total Protein   Date Value Ref Range Status   02/18/2025 6.0 6.0 - 8.4 g/dL Final     Lactic acid:   Lab Results   Component Value Date    LACTATE 1.6 04/21/2025    LACTATE 0.8 04/20/2025       Significant Imaging: I have reviewed all pertinent imaging results/findings within the past 24 hours.      WBC Whole Body Scan 4/25/25  Impression:     The mild diffuse increased uptake around the left knee joint likely related to infectious/ inflammatory arthritis.  No other foci of abnormal increased uptake to suggest other source of infection

## 2025-05-02 NOTE — PLAN OF CARE
Problem: Delirium  Goal: Optimal Coping  Outcome: Progressing     Problem: Coping Ineffective  Goal: Effective Coping  Outcome: Progressing     Problem: Palliative Care  Goal: Enhanced Quality of Life  Outcome: Progressing

## 2025-05-02 NOTE — PLAN OF CARE
[facility]  Discharge Reassessment    Primary Care Provider: Isela Langston MD    Expected Discharge Date:     Reassessment (most recent)       Discharge Reassessment - 05/02/25 1551          Discharge Reassessment    Assessment Type Discharge Planning Reassessment     Discharge Plan discussed with: Adult children     Communicated NOHEMI with patient/caregiver Date not available/Unable to determine     Discharge Plan A Hospice/home;Group Home     Discharge Plan B Hospice/home     DME Needed Upon Discharge  none     Transition of Care Barriers None     Why the patient remains in the hospital Requires continued medical care                   MARINE informed by Dr Mayo that pt's daughters are considering hospice at a [place] residence vs home with hospice.  SW met with pt's daughters Esther and Marina at bedside who confirmed that they have been in contact with a [place] residence but did not know which one.  Marina reported she has the contact information but not with her, so SW provided name and number and asked Marina to call SW with the contact information for the home.    Discharge Plan A and Plan B have been determined by review of patient's clinical status, future medical and therapeutic needs, and coverage/benefits for post-acute care in coordination with multidisciplinary team members.  Will continue to follow.      Karey Castelan LMSW  [facility]  [id number]

## 2025-05-02 NOTE — TELEPHONE ENCOUNTER
"I returned Marina call back, and she reported that her mom has been moved over to Palliative/Hospice and at this point she is desperate. She knows that the doctors at [facility] have been keeping you in the loop in what's going on with Ms. Bynum.   Marina is very upset and would like for you to call her back she stated "I need to hear a familiar voice"  Marina feels like the the doctors are saying what they can say  but they are not saying much. She's being told that they've done what they could for her mom and they transitioned her mom to hospice but hospice is telling her that she does not need hospice.  Marina states that 2 weeks ago patient had a pacemaker placed in and was told that "they can choose to turn off the pacemaker"  So Marina feels like she's getting the run around and nobody is really telling her the truth as to what is going on with her mom.  She states that she would like to talk to you for guidance and to also for you to explain to her what is going on with her mom and what should be the next steps.  "

## 2025-05-02 NOTE — TELEPHONE ENCOUNTER
----- Message from Syeda sent at 4/30/2025 11:15 AM CDT -----  Type: Patient Call Back Who called:Sirisha Daughter  What is the request in detail:Pt care daughter requesting a call  Can the clinic reply by [facility]? No Would the patient rather a call back or a response via [facility]? Call back Best call back number:[phone] Additional Information: Thank you.

## 2025-05-02 NOTE — PROGRESS NOTES
[facility]  Palliative Medicine  Progress Note    Patient Name: Misa Barajas  MRN: [id number]  Admission Date: 4/6/2025  Hospital Length of Stay: 26 days  Code Status: DNR   Attending Provider: Aquilino Mayo MD  Consulting Provider: Aquilino Mayo MD  Primary Care Physician: Isela Langston MD  Principal Problem:Palliative care encounter    Patient information was obtained from relative(s) and nursing.      Assessment/Plan:     Palliative Care  * Palliative care encounter  Impression:  Misa Barajas is a 79 y.o. female with PMHx of COPD on 2L home O2, HFpEF, AFL s/p RFA (6/17/24 w/ Dr. Church), CKD III, HTN, HLD, chronic venous insufficiency, orthostatic hypotension who was brought via EMS after per history she was found confused and bradycardiac with HR in the 20s. Patient was percutaneously paced. During hospitalization, was intubated then extubated with residual hoarseness of voice (ENT following), required intermittent pacing by TVP, a Micra PM by EP placed 04/10, aggressive diuresing, recurrent sepsis and hypothermia, patient has required several trials of antibiotics, anticipating WBC scan on 4/25 (ID following), and presumed PE (unable to verify with CTA secondary to MARQUEZ).      Of note, patient has had 7x ED visits in the last year. Patient has engaged in goals of care discussions with Dr. Velasquez of Palliative Medicine for goals of care and symptom management. Palliative Medicine was consulted by primary, Dr. Antunez, for goals of care and advanced care planning discussions.    Patient is currently resting in bed, AAOx4, daughters (Sirisha and Esther) at bedside. Patient and family are amenable to Pal [facility]. Pal Med APRN and LCSW at bedside for goals of care discussions.     Transferred to [facility] on 4/29 after transitioning to comfort focused care.    - Prior experience with serious illness: yes  -The patient has previously engaged in advance care planning or GOC  "discussions  - Insight/Understanding of illness: yes      Advance Care Planning    Date: 04/29/2025  - Family meeting at bedside with Esther Grimm, Dr. Antunez, and HAFSA Hdz DNP.   - Philosophies of hospice reviewed.   - Patient and family amenable to pivoting to comfort-focus care and transferring to [facility].  - Case discussed with [facility] Attending, Dr. Mayo and case management team.   - Transfer orders placed. Sirisha and Esther notified via telephone.     Date: 04/28/2025  - Patient resting in bed, 4L O2, jeffrey hugger warming blanket in place, no acute distress upon my visit. Patient's daughters arrive at bedside shortly after.   - Daughters requested we meet outside of room so that we do not disturb patient's rest. I met with patient's daughter in 3rd floor Visitor's Garden.  - We discussed, patient's recent hospital encounters, current hospitalization, and patient's progressive health decline.   - Daughters acknowledge patient's journey in/out of hospital for past year has been tiring and exhausting and are interested in exploring hospice options. Sirisha states she has been trying to juggle working and caring for her mom after her work day, but is limited in the care that she gives her mom because she has no past medical background. Esther states she has been fortunate to be able to work from home to be with her mom more often. Both daughters acknowledged the emotional strain caring for their mom has caused. Daughters state when something "goes wrong", they try to do as much as they can (limited to checking a blood pressure, O2 sats, and giving a dose of zofran). Once they have exhausted those interventions, they call 911 because they know they are "in over their heads" to get additional help. Patient is then admitted to hospital for additional workup. Sirisha states that it has been so traumatizing when she sees her mom is sick, tries to do everything she can to care for her mom, notices it is not working, and hears her " "mom begging for help, but refuses to go to the hospital. This has created immense stress on the daughters. Patient never wants to go to the hospital, but eventually agrees to come to ED because either daughters convince her or because patient becomes too sick to independently make a decision. Sirisha states she is worried that she is "giving up" on her mom even though patient has been "fighting" for so long. Philosophies of hospice introduced and discussed. Esther states she has been caring for and thinking about how her mom has progressively declined over the last year and expresses desires to prioritize patient's comfort knowing that with comfort being the priority, life will be limited. Daughters acknowledged patient wants to go home, but state they do not have the resources to continue to care for the patient at home. Daughters inquired about inpatient hospice unit, discussed in-patient hospice criteria. During conversation, daughters became appropriately tearful. Emotional support provided. Daughters asked for additional time to process steps forward and where the patient would continued to be cared for.   - After speaking with the daughters, I rounded on the patient again. Patient was awake and amenable to Pal Med. Patient acknowledges her health decline and states it has been taxing and tiring as she has had multiple hospitalization, including the current encounter. Patient agrees that her comfort has not been prioritized over the last year and if open to talking more about hospice care. Philosophies of hospice were introduced and discussed. Patient is accepting, and states she would like to go home. I informed patient that daughters expressed concerns being able to care for patient and would like to talk more about facility placement/in-patient hospice unit. Patient expressed that she was overwhelmed with the conversation and describes it as "a lot." Patient acknowledges she was not expecting to be having " this [hospice] conversation so soon, but understands that we need to have it because she has not had much quality of life lately. Patient states she would like additional time to talk with her daughters.   - Tentative family meeting 4/29/30 at 0900. Primary team, Dr. Antunez, updated via secure chat.    Date: 04/25/2025  - No acute events overnight. Patient anticipates NM Inflammatory Whole Body scan today.  - Revisited goals of care discussion from yesterday. Patient acknowledged it was a difficult conversation to have, but has been open to it. As we anticipate more information from the pending studies, goals of care discussions are also pending. Patient expressed concerns regarding nuclear scan and when it will be getting done. Patient states she thought it would have been done by now. I followed up with radiology to inquire about scan time, and updated patient on the 1-3pm time frame that radiology hopes to see patient. Patient's daughter Esther was at bedside and updated.   - Patient with no needs at this time.  - Primary team updated.        Date: 04/24/2025    Code Status  In light of the patients advanced and life limiting illness, I engaged the patient and family in a voluntary conversation about the patient's preferences for care  at the very end of life. The patient wishes to have a natural, peaceful death.  Along those lines, the patient does not wish to have CPR or other invasive treatments performed when her heart and/or breathing stops. I communicated to the patient and family that a DNR order would be placed in her medical record to reflect this preference.        [facility]  I engaged the patient and family in a voluntary conversation about advance care planning and we specifically addressed what the goals of care would be moving forward, in light of the patient's change in clinical status, specifically recurrent hospitalizations over past year, hypothermia, acute renal failure, CHF exacerbation, and current  "hosptialization.  We did specifically address the patient's likely prognosis, which appears to be poor.  We explored the patient's values and preferences for future care.  The patient and family endorses that what is most important right now is to focus on spending time at home, avoiding the hospital, quality of life, even if it means sacrificing a little time, and allowing additional time for infectious workup    Accordingly, we have decided that the best plan to meet the patient's goals includes continuing with treatment and continuing goals of care discussions pending infectious workup               Life Limiting Diagnosis  Acute on Chronic CHF  Acute Renal Failure on CKD stage 3b  Complete Heart Block    Symptom Management  - Pain: no  - acetaminophen 650mg q6h PRN    - Dyspnea: no  - on 2L at home  - maintained on 4L O2    - Constipation: no  - senna-docusate daily PRN  - poluethylene glycol daily PRN    - Nausea: no  - ondansetron 8mg q8h    - Debility: yes  - Functional status: fair.  Pt was not able to manage ADLs  - Fall precautions  - PT/OT recommending moderate intensity therapy          Plan:   - Code status: DNR  - Disposition: working for NH with hospice vs home hospice with caregivers; family exploring options  - comfort focused care plan          Subjective:     Chief Complaint:   Chief Complaint   Patient presents with    Bradycardia     Arrives via EMS hypotensive 80s/50s and bradycadic in the 20s.       HPI:   As per H&P, "Misa Barajas is a 79 y.o. female COPD on 2L home O2, HFpEF, AFL s/p RFA (6/17/24 w/ Dr. Church), CKD III, HTN, HLD, chronic venous insufficiency, orthostatic hypotension who was brought via EMS after per history she was found confused and bradycardiac with HR in the 20s.They started epi and transcutaneous pacing. Given Versed for the transcutaneous pacing. They had to bag her in route. We do not have strip of underlying rhythm. Patient obtunded for which she was intubated " for airway protection on arrival to the ED and transcutaneous pacing was exchanged to transvenous pacing via RIJ (threshold 0.6). Underlying rate 44 bifascicular block. Known RBBB. Per daughters patient had called them over the phone when they noticed she appeared confused. Due to concerns for a possible syncopal episode EMS was called. Prior to event patient had been complaining of malaise and nausea for which she took about two Zofran pills per the daughters.      She had been recently seen on 3/27/25 at the [facility] visit following recent admission for AHRF 2/2 ADHF and COPD exacerbation. On ED presentation BNP elevated to 2500, troponin elevated to 19, CXR with pulmonary edema. She was started on steroids, nebs and antibiotics in addition to IV diuresis. Updated TTE stable from prior, showing HFpEF with elevated CVP 15. Her O2 requirements improved to baseline with diuresis and she was subsequently discharged. At that visit patient had not required midodrine as blood pressure had remained stable and she was asymptomatic.      Off note, patient was taken off OAC due to bleeding issues and no recurrent of typical atrial flutter after ablation.      Overview/Hospital Course:  Admitted to CCU for CHB in s/o hyperkalemia & in the presence of conduction disease/RBBB & LAHB - stable w/ temporary wire. ??Septic shock deemed to be d/t UTI - ?? UA with only 7 WBCs); vasopressors weaned off. HTN; started NG gtt. Acute on chronic hypoxic/hypercapnic RF & obtunded; intubated. Successfully extubated 04/08. MARQUEZ on CKD; improved w/ good UOP response to diuresis. PVCs w/ intermittent pacing by TVP. Switched pip-tazo to amox-clav on 04/09; continued to improve; empiric EED 04/13 for 7 day course. Micra PM by EP placed 04/10. Medically stable for stepdown. Patient was extremely short of breath with physical therapy, chest x-ray concerning for pulmonary edema.  Started patient on IV diuresis, now euvolemic.  ENT was consulted  "yesterday due to hoarseness of voice.  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 Pt uninterested in any procedure at this time, ENT recommended SLP follow up.  Able to tolerate regular diet at this point.  Now agreeable for SNF, working on placement. Course c/b worsening hypoxia & hyperNa.       Overnight 4/18-4/19, decompensated with recurrent sepsis, hypothermia 92, possible PNA with infiltrates on CXR, Zosyn IV started. Remained hypothermic despite Zosyn x 2 doses and Jeffrey hugger in place (pt uncomfortable/hot and asking for removal), so added Vanc IV STAT. Checked TSH and FT4-  wnl. UA with pyuria however not a clean catch w 33 sq epi, so repeated UA. C/o "stomach" pain, overall picture with the "stomach" pain and septic picture is "exactly what led us to ICU 2 weeks ago" per daughter. +TTP RUQ, RLQ, LLQ.   Poor UOP, only 200cc by mid-shift, and still hyperNa with FWD 1.8L so started gentle hydration with D5W @ 75cc for 24 hrs. May need to diurese again after sepsis resolves. CT C/A/P noncon with pulm edema, B pleural effusions, possible PNA, no intra-abdominal process seen.      On 4/21, Mental status much improved, however still with mild confusion and feels very tired.   decompensated further with recurrent MARQUEZ, C/o new SOB and hypoxemic 90% on 4L (over her baseline of 2L at home), improved to >94% on 6L with improvement of SOB.  C/o new L chest pressure/pain 4/10 (non-radiating), c/f MI or PE, resolved after nitro SL x 1 however HypoT with SBP upper 80s so will avoid further nitro.  HS trop 38 (decreased from prior), BNP 700s (increased from prior).CXR with pulm edema, all most c/w ADHF- Lasix 40 IV x 1 given.    Concern for untreated sepsis with ongoing hypothermia requiring jeffrey hugger despite Vanc/Zosyn for over 24 hours, so broadened to Vanc/Sher/Dinah, consulted ID, repeated BCx x 2. Lactate wnl.  Re: concern for PE, Known LIJ DVT, apixaban x several days then " "stopped (likely for hemorrhage of vocal fold)- discussed w ENT 4/20, ok to resume anticoag if needed. PE workup- F/u STAT UE and LE Dopplers. Unable to check CTA chest given MARQUEZ, unable to check V/Q given pulm edema. Anticoagulation with Apixaban.  Hypothermia returned however pt looks and feels the best she has in 4 days.  Voice is getting stronger.  Sitting in chair.  UCx resulted with ESBL Klebsiella, so Sher has been good coverage.  ID changed to Dapto (from Vanc), continuing Sher, for coverage of UTI and well as PNA.  BCx have all been negative.  Continuing Eliquis for DVt LUE and presumed PE.  Acute hypox resp failure 2/2 ADHF and presumed PE, still on 6L, dosing Lasix IV PRN daily given yet-unclear status of her sepsis and avoiding volume depletion in sepsis.      Prior to admit, was independent in her residence with her cat.  Two daughters very supportive.         Interval History: Pt seen and examined this morning on rounds. Hypothermia ongoing this morning, required Manny hugger.  Resolved in afternoon.  If hypothermia returns overnight, will repeat BCx x 2.  Attempting sputum sample for Cx as well, not optimistic for any yield. NM Full body tagged WBC scan per ID recs- will get injection tomorrow 4/24 then scan on 4/25.      Re-dosed Lasix at higher dose 80mg IV x 1, re-eval again tomorrow.      Pall care consulted, NOT for hospice, NOT due to a downward clinical trajectory, as pt shows signs of clinical improvement except for the hypothermia -- consult is rather for continuity and continuation of ACP/GOC started as outpt prior to admission.       Care plan reviewed. Otherwise, doing well and with no further complaints at this time."        Hospital Course:  No notes on file    Interval History: No acute events overnight. ; more lethargic but intermittently interacting with family    Raspy dysphonic voice    No complaints of pain, nausea, anxiety, or dyspnea    Tells me she has come to terms with her " condition; looking  forward to what is next    Daughters at bedside today    Past Medical History:   Diagnosis Date    Acute biliary pancreatitis 07/27/2024    Acute pancreatitis 07/27/2024    Acute pulmonary embolism 4/21/2025    Anemia 07/12/2024    Aortic atherosclerosis     Arthritis     knee joint pain    Asthma-COPD overlap syndrome 08/22/2022    home o2    Breast cancer 2002    left breast & lymph nodes-s/p sx with chemo    Cataracts, bilateral     Chronic diastolic heart failure 12/24/2019    Chronic kidney disease, stage 3     Class 1 obesity due to excess calories with serious comorbidity and body mass index (BMI) of 30.0 to 30.9 in adult 05/16/2024    Gram-negative bacteremia - Pasturella multocida 07/12/2024    History of chemotherapy     last treatment 12/2002 (had 8 treatments)    Hyperlipidemia 11/20/2016    Hypertension     Lymphedema of both lower extremities 01/25/2022    Nephrolithiasis 06/02/2014    Osteoporosis     Paroxysmal atrial fibrillation 06/07/2021    Renal osteodystrophy 01/14/2016    Sepsis 4/19/2025    Severe protein-calorie malnutrition 4/23/2025    Shock 04/06/2025    Subclinical hypothyroidism 04/06/2025    Venous stasis dermatitis of both lower extremities 01/25/2022    Vitamin D insufficiency        Past Surgical History:   Procedure Laterality Date    ABLATION, ATRIAL FLUTTER, TYPICAL N/A 6/17/2024    Procedure: Ablation, Atrial Flutter, Typical;  Surgeon: Taz Church MD;  Location: [facility] EP LAB;  Service: Cardiology;  Laterality: N/A;  AFL, RFA, LORENE, MAKAYLA (cx if SR), anes, MB, 3 Prep    BREAST BIOPSY Left 2002    core bx, +    BREAST BIOPSY Right 2018    core    BREAST BIOPSY Right 2019    BREAST LUMPECTOMY Left 2002    CYSTOSCOPY  4/28/2022    Procedure: CYSTOSCOPY;  Surgeon: Vik Ware MD;  [facility];  Service: Urology;;    CYSTOSCOPY  5/30/2022    Procedure: CYSTOSCOPY;  Surgeon: Bonnie Knutson MD;  Location: [facility] OR [address] [place] [address];   Service: Urology;;    ECHOCARDIOGRAM,TRANSESOPHAGEAL N/A 6/17/2024    Procedure: Transesophageal echo (MAKAYLA) intra-procedure log documentation;  Surgeon: Nestor Martinez MD;  Location: [facility] EP LAB;  Service: Cardiology;  Laterality: N/A;    ERCP N/A 7/30/2024    Procedure: ERCP (ENDOSCOPIC RETROGRADE CHOLANGIOPANCREATOGRAPHY);  Surgeon: Sierra Cotto MD;  Location: [facility] ENDO (2ND FLR);  Service: Endoscopy;  Laterality: N/A;    IMPLANTATION OF LEADLESS PACEMAKER N/A 4/10/2025    Procedure: INSERTION, CARDIAC PACEMAKER, LEADLESS;  Surgeon: CAROLINE Solis MD;  Location: [facility] EP LAB;  Service: Cardiology;  Laterality: N/A;  CHB, MICRA leadless PPM, MDT, Anes, EH, CICU [id number]    LASER LITHOTRIPSY  5/30/2022    Procedure: LITHOTRIPSY, USING LASER;  Surgeon: Bonnie Knutson MD;  Location: [facility] OR 1ST FLR;  Service: Urology;;    LITHOTRIPSY      MASTECTOMY Left 06/2002    left-& lymph node dissection    REMOVAL, PACEMAKER, TEMPORARY TRANSVENOUS  4/10/2025    Procedure: Removal, Pacemaker, Temporary Transvenous;  Surgeon: CAROLINE Solis MD;  Location: [facility] EP LAB;  Service: Cardiology;;    REPLACEMENT OF STENT Right 5/30/2022    Procedure: REPLACEMENT, STENT;  Surgeon: Bonnie Knutson MD;  Location: [facility] OR 1ST FLR;  Service: Urology;  Laterality: Right;    RETROGRADE PYELOGRAPHY Right 4/28/2022    Procedure: PYELOGRAM, RETROGRADE;  Surgeon: Vik Ware MD;  Location: [facility] OR 1ST FLR;  Service: Urology;  Laterality: Right;    ROBOT-ASSISTED LAPAROSCOPIC PARTIAL NEPHRECTOMY USING DA JESÚS XI Right 3/11/2021    Procedure: XI ROBOTIC NEPHRECTOMY, PARTIAL;  Surgeon: Taz Ortega MD;  Location: [facility] OR 2ND FLR;  Service: Urology;  Laterality: Right;  4hr/ gen with regional  [place] confirmation:  [id number] for 11:15am case NC    URETEROSCOPIC REMOVAL OF URETERIC CALCULUS Right 5/30/2022    Procedure: REMOVAL, CALCULUS, URETER, URETEROSCOPIC;  Surgeon: Bonnie Knutson MD;  Location: [facility]  OR 1ST FLR;  Service: Urology;  Laterality: Right;    ureteroscopy Bilateral     6.14    URETEROSCOPY Right 5/30/2022    Procedure: URETEROSCOPY;  Surgeon: Bonnie Knutson MD;  Location: [facility] OR 1ST FLR;  Service: Urology;  Laterality: Right;       Review of patient's allergies indicates:   Allergen Reactions    Adhesive tape-silicones     Adhesive Rash     Pt states she removed a LATEX bandaid and the skin beneath was swollen and red. No other latex causes a reaction.       Medications:  Continuous Infusions:  Scheduled Meds:   apixaban  5 mg Oral BID    levothyroxine  50 mcg Oral Before breakfast    LIDOcaine  1 patch Transdermal Q24H    metoprolol tartrate  12.5 mg Oral BID     PRN Meds:  Current Facility-Administered Medications:     acetaminophen, 650 mg, Oral, Q6H PRN    HYDROmorphone, 0.2 mg, Intravenous, Q4H PRN    lorazepam, 0.25 mg, Intravenous, Q30 Min PRN    nitroGLYCERIN, 0.4 mg, Sublingual, Q5 Min PRN    ondansetron, 8 mg, Oral, Q8H PRN    polyethylene glycol, 17 g, Oral, BID PRN    senna, 8.6 mg, Oral, Daily PRN    simethicone, 1 tablet, Oral, TID PRN    Family History       Problem Relation (Age of Onset)    Arthritis Mother, Sister    Bone cancer Mother    Breast cancer Mother    Cancer Father    Crohn's disease Daughter    Fibroids Daughter    No Known Problems Brother, Daughter          Tobacco Use    Smoking status: Former     Current packs/day: 0.00     Average packs/day: 1 pack/day for 50.0 years (50.0 ttl pk-yrs)     Types: Cigarettes     Start date: 1960     Quit date: 5/20/2009     Years since quitting: 15.9    Smokeless tobacco: Former   Substance and Sexual Activity    Alcohol use: No    Drug use: No    Sexual activity: Not Currently     Partners: Male     Birth control/protection: Partner-Vasectomy       Review of Systems   Constitutional:  Positive for activity change and fatigue.   HENT:  Positive for voice change.    Respiratory:  Positive for shortness of breath.     Cardiovascular:  Positive for leg swelling.   Gastrointestinal:  Positive for nausea.   Neurological:  Positive for weakness.   Hematological:  Bruises/bleeds easily.     Objective:     Vital Signs (Most Recent):  Temp: 97.3 °F (36.3 °C) (05/02/25 0745)  Pulse: 61 (05/02/25 0745)  Resp: 20 (05/02/25 0745)  BP: 130/70 (05/02/25 0745)  SpO2: 95 % (05/02/25 0745) Vital Signs (24h Range):  Temp:  [97 °F (36.1 °C)-97.3 °F (36.3 °C)] 97.3 °F (36.3 °C)  Pulse:  [61] 61  Resp:  [18-24] 20  SpO2:  [95 %-96 %] 95 %  BP: (129-130)/(70-80) 130/70     Weight: 70.5 kg (155 lb 6.8 oz)  Body mass index is 26.68 kg/m².       Physical Exam  Vitals and nursing note reviewed.   Constitutional:       General: She is not in acute distress.     Appearance: She is ill-appearing.   HENT:      Head: Normocephalic and atraumatic.      Nose: Nose normal.      Mouth/Throat:      Mouth: Mucous membranes are dry.   Eyes:      Extraocular Movements: Extraocular movements intact.   Cardiovascular:      Rate and Rhythm: Normal rate.   Pulmonary:      Effort: Pulmonary effort is normal.   Skin:     General: Skin is warm and dry.   Neurological:      Mental Status: She is alert and oriented to person, place, and time.      Motor: Weakness present.   Psychiatric:         Mood and Affect: Mood normal.         Behavior: Behavior normal.         Thought Content: Thought content normal.         Judgment: Judgment normal.            Review of Symptoms      Symptom Assessment (ESAS 0-10 Scale)  Pain:  0  Dyspnea:  0  Anxiety:  0  Nausea:  0  Depression:  0  Anorexia:  0  Fatigue:  0  Insomnia:  0  Restlessness:  0  Agitation:  0     CAM / Delirium:  Negative  Constipation:  Negative  Diarrhea:  Negative      Modified Berto Scale:  1    Performance Status:  50    Psychosocial/Cultural:   See Palliative Psychosocial Note: Yes  - Previously , now .  - Prior to patient's hospitalizations, she was living alone. Daughter lived in neighborhood and  "assisted patient with cleaning and cooking  - Over the last year, patient/daughters report patient has spent more time in the hospital/rehab than at home.   - Patient states he enjoys sitting on her porch, gardening in her garden pots, and spending time with her tabby cat (male, Romeo)   - Patient's primary support system includes her 2 daughters, Sirisha and Esther.  - previously enjoyed driving, shopping at [employer], [employer], and [place], but states it has been a while since she has been able to do all the things she enjoys  **Primary  to Follow**  Palliative Care  Consult: Yes    Spiritual:  F - Megan and Belief:  Alevism        Advance Care Planning  Advance Directives:   Living Will: Yes        Copy on chart: Yes    [place]POST: No    Do Not Resuscitate Status: Yes    Medical Power of : Yes    Agent's Name:  Esther Barajas   Agent's Contact Number:  [phone]    Decision Making:  Patient answered questions and Family answered questions  Goals of Care: The patient endorses that what is most important right now is to focus on symptom/pain control, quality of life, even if it means sacrificing a little time, and comfort and QOL     Accordingly, we have decided that the best plan to meet the patient's goals includes enrolling in hospice care and pivot to comfort-focused care           Significant Labs: All pertinent labs within the past 24 hours have been reviewed.  CBC:   Recent Labs   Lab 04/29/25  0526   WBC 6.38   HGB 11.5*   HCT 37.3   MCV 92        BMP:  No results for input(s): "GLU", "NA", "K", "CL", "CO2", "BUN", "CREATININE", "CALCIUM", "MG" in the last 24 hours.    LFT:  Lab Results   Component Value Date    AST 47 (H) 04/29/2025    ALKPHOS 107 04/29/2025    BILITOT 0.5 04/29/2025     Albumin:   Albumin   Date Value Ref Range Status   04/29/2025 2.2 (L) 3.5 - 5.2 g/dL Final   02/18/2025 2.8 (L) 3.5 - 5.2 g/dL Final     Protein:   Protein Total   Date " Value Ref Range Status   04/29/2025 5.4 (L) 6.0 - 8.4 gm/dL Final     Total Protein   Date Value Ref Range Status   02/18/2025 6.0 6.0 - 8.4 g/dL Final     Lactic acid:   Lab Results   Component Value Date    LACTATE 1.6 04/21/2025    LACTATE 0.8 04/20/2025       Significant Imaging: I have reviewed all pertinent imaging results/findings within the past 24 hours.      WBC Whole Body Scan 4/25/25  Impression:     The mild diffuse increased uptake around the left knee joint likely related to infectious/ inflammatory arthritis.  No other foci of abnormal increased uptake to suggest other source of infection    Aquilino Mayo MD  Palliative Medicine  [facility] - [facility] and Palliative Medicine

## 2025-05-02 NOTE — ASSESSMENT & PLAN NOTE
Impression:  Misa Barajas is a 79 y.o. female with PMHx of COPD on 2L home O2, HFpEF, AFL s/p RFA (6/17/24 w/ Dr. Church), CKD III, HTN, HLD, chronic venous insufficiency, orthostatic hypotension who was brought via EMS after per history she was found confused and bradycardiac with HR in the 20s. Patient was percutaneously paced. During hospitalization, was intubated then extubated with residual hoarseness of voice (ENT following), required intermittent pacing by TVP, a Micra PM by EP placed 04/10, aggressive diuresing, recurrent sepsis and hypothermia, patient has required several trials of antibiotics, anticipating WBC scan on 4/25 (ID following), and presumed PE (unable to verify with CTA secondary to MARQUEZ).      Of note, patient has had 7x ED visits in the last year. Patient has engaged in goals of care discussions with Dr. Velasquez of Palliative Medicine for goals of care and symptom management. Palliative Medicine was consulted by primary, Dr. Antunez, for goals of care and advanced care planning discussions.    Patient is currently resting in bed, AAOx4, daughters (Sirisha and Esther) at bedside. Patient and family are amenable to Pal Med. Pal Med APRN and LCSW at bedside for goals of care discussions.     Transferred to [facility] on 4/29 after transitioning to comfort focused care.    - Prior experience with serious illness: yes  -The patient has previously engaged in advance care planning or GOC discussions  - Insight/Understanding of illness: yes      Advance Care Planning     Date: 04/29/2025  - Family meeting at bedside with Esther Grimm, Dr. Antunez, and HAFSA Hdz DNP.   - Philosophies of hospice reviewed.   - Patient and family amenable to pivoting to comfort-focus care and transferring to [facility].  - Case discussed with [facility] Attending, Dr. Mayo and case management team.   - Transfer orders placed. Elyssa notified via telephone.     Date: 04/28/2025  - Patient resting in bed, 4L O2, jeffrey hugger warming  "blanket in place, no acute distress upon my visit. Patient's daughters arrive at bedside shortly after.   - Daughters requested we meet outside of room so that we do not disturb patient's rest. I met with patient's daughter in 3rd floor Visitor's Garden.  - We discussed, patient's recent hospital encounters, current hospitalization, and patient's progressive health decline.   - Daughters acknowledge patient's journey in/out of hospital for past year has been tiring and exhausting and are interested in exploring hospice options. Sirisha states she has been trying to juggle working and caring for her mom after her work day, but is limited in the care that she gives her mom because she has no past medical background. Esther states she has been fortunate to be able to work from home to be with her mom more often. Both daughters acknowledged the emotional strain caring for their mom has caused. Daughters state when something "goes wrong", they try to do as much as they can (limited to checking a blood pressure, O2 sats, and giving a dose of zofran). Once they have exhausted those interventions, they call 911 because they know they are "in over their heads" to get additional help. Patient is then admitted to hospital for additional workup. Sirisha states that it has been so traumatizing when she sees her mom is sick, tries to do everything she can to care for her mom, notices it is not working, and hears her mom begging for help, but refuses to go to the hospital. This has created immense stress on the daughters. Patient never wants to go to the hospital, but eventually agrees to come to ED because either daughters convince her or because patient becomes too sick to independently make a decision. Sirisha states she is worried that she is "giving up" on her mom even though patient has been "fighting" for so long. Philosophies of hospice introduced and discussed. Esther states she has been caring for and thinking about how her " "mom has progressively declined over the last year and expresses desires to prioritize patient's comfort knowing that with comfort being the priority, life will be limited. Daughters acknowledged patient wants to go home, but state they do not have the resources to continue to care for the patient at home. Daughters inquired about inpatient hospice unit, discussed in-patient hospice criteria. During conversation, daughters became appropriately tearful. Emotional support provided. Daughters asked for additional time to process steps forward and where the patient would continued to be cared for.   - After speaking with the daughters, I rounded on the patient again. Patient was awake and amenable to Pal Med. Patient acknowledges her health decline and states it has been taxing and tiring as she has had multiple hospitalization, including the current encounter. Patient agrees that her comfort has not been prioritized over the last year and if open to talking more about hospice care. Philosophies of hospice were introduced and discussed. Patient is accepting, and states she would like to go home. I informed patient that daughters expressed concerns being able to care for patient and would like to talk more about facility placement/in-patient hospice unit. Patient expressed that she was overwhelmed with the conversation and describes it as "a lot." Patient acknowledges she was not expecting to be having this [hospice] conversation so soon, but understands that we need to have it because she has not had much quality of life lately. Patient states she would like additional time to talk with her daughters.   - Tentative family meeting 4/29/30 at 0900. Primary team, Dr. Antunez, updated via secure chat.    Date: 04/25/2025  - No acute events overnight. Patient anticipates NM Inflammatory Whole Body scan today.  - Revisited goals of care discussion from yesterday. Patient acknowledged it was a difficult conversation to have, but " has been open to it. As we anticipate more information from the pending studies, goals of care discussions are also pending. Patient expressed concerns regarding nuclear scan and when it will be getting done. Patient states she thought it would have been done by now. I followed up with radiology to inquire about scan time, and updated patient on the 1-3pm time frame that radiology hopes to see patient. Patient's daughter Esther was at bedside and updated.   - Patient with no needs at this time.  - Primary team updated.        Date: 04/24/2025    Code Status  In light of the patients advanced and life limiting illness, I engaged the patient and family in a voluntary conversation about the patient's preferences for care  at the very end of life. The patient wishes to have a natural, peaceful death.  Along those lines, the patient does not wish to have CPR or other invasive treatments performed when her heart and/or breathing stops. I communicated to the patient and family that a DNR order would be placed in her medical record to reflect this preference.        [facility]  I engaged the patient and family in a voluntary conversation about advance care planning and we specifically addressed what the goals of care would be moving forward, in light of the patient's change in clinical status, specifically recurrent hospitalizations over past year, hypothermia, acute renal failure, CHF exacerbation, and current hosptialization.  We did specifically address the patient's likely prognosis, which appears to be poor.  We explored the patient's values and preferences for future care.  The patient and family endorses that what is most important right now is to focus on spending time at home, avoiding the hospital, quality of life, even if it means sacrificing a little time, and allowing additional time for infectious workup    Accordingly, we have decided that the best plan to meet the patient's goals includes continuing with treatment  and continuing goals of care discussions pending infectious workup               Life Limiting Diagnosis  Acute on Chronic CHF  Acute Renal Failure on CKD stage 3b  Complete Heart Block    Symptom Management  - Pain: no  - acetaminophen 650mg q6h PRN    - Dyspnea: no  - on 2L at home  - maintained on 4L O2    - Constipation: no  - senna-docusate daily PRN  - poluethylene glycol daily PRN    - Nausea: no  - ondansetron 8mg q8h    - Debility: yes  - Functional status: fair.  Pt was not able to manage ADLs  - Fall precautions  - PT/OT recommending moderate intensity therapy          Plan:   - Code status: DNR  - Disposition: working for NH with hospice vs home hospice with caregivers; family exploring options  - comfort focused care plan

## 2025-05-02 NOTE — PLAN OF CARE
Problem: Skin Injury Risk Increased  Goal: Skin Health and Integrity  Outcome: Progressing     Problem: Adult Inpatient Plan of Care  Goal: Plan of Care Review  Outcome: Progressing  Goal: Patient-Specific Goal (Individualized)  Outcome: Progressing  Goal: Absence of Hospital-Acquired Illness or Injury  Outcome: Progressing  Goal: Optimal Comfort and Wellbeing  Outcome: Progressing  Goal: Readiness for Transition of Care  Outcome: Progressing     Problem: Fall Injury Risk  Goal: Absence of Fall and Fall-Related Injury  Outcome: Progressing     Problem: Infection  Goal: Absence of Infection Signs and Symptoms  Outcome: Progressing     Problem: Wound  Goal: Optimal Coping  Outcome: Progressing  Goal: Optimal Functional Ability  Outcome: Progressing  Goal: Absence of Infection Signs and Symptoms  Outcome: Progressing  Goal: Optimal Pain Control and Function  Outcome: Progressing  Goal: Skin Health and Integrity  Outcome: Progressing  Goal: Optimal Wound Healing  Outcome: Progressing     Problem: Acute Kidney Injury/Impairment  Goal: Effective Renal Function  Outcome: Progressing     Problem: Delirium  Goal: Optimal Coping  Outcome: Progressing  Goal: Improved Behavioral Control  Outcome: Progressing  Goal: Improved Attention and Thought Clarity  Outcome: Progressing  Goal: Improved Sleep  Outcome: Progressing     Problem: Sepsis/Septic Shock  Goal: Optimal Coping  Outcome: Progressing  Goal: Absence of Bleeding  Outcome: Progressing  Goal: Absence of Infection Signs and Symptoms  Outcome: Progressing     Problem: Coping Ineffective  Goal: Effective Coping  Outcome: Progressing     Problem: Palliative Care  Goal: Enhanced Quality of Life  Outcome: Progressing

## 2025-05-02 NOTE — NURSING
Refused to be turned most of the day.  Daughter indicates that the patient repositions herself at times .    Refused all meals.

## 2025-05-03 NOTE — SUBJECTIVE & OBJECTIVE
"Interval History: resting in bed, somewhat somnolent but will open eyes to voice and gentle stimuli.     Medications:  Continuous Infusions:  Scheduled Meds:   apixaban  5 mg Oral BID    levothyroxine  50 mcg Oral Before breakfast    LIDOcaine  1 patch Transdermal Q24H    metoprolol tartrate  12.5 mg Oral BID     PRN Meds:  Current Facility-Administered Medications:     acetaminophen, 650 mg, Oral, Q6H PRN    lorazepam, 0.25 mg, Intravenous, Q30 Min PRN    nitroGLYCERIN, 0.4 mg, Sublingual, Q5 Min PRN    ondansetron, 8 mg, Oral, Q8H PRN    polyethylene glycol, 17 g, Oral, BID PRN    senna, 8.6 mg, Oral, Daily PRN    simethicone, 1 tablet, Oral, TID PRN    Objective:     Vital Signs (Most Recent):  Temp: 98.6 °F (37 °C) (05/03/25 0846)  Pulse: 64 (05/03/25 0846)  Resp: 15 (05/03/25 1044)  BP: (!) 123/58 (05/03/25 0846)  SpO2: 95 % (05/03/25 0846) Vital Signs (24h Range):  Temp:  [97.5 °F (36.4 °C)-98.6 °F (37 °C)] 98.6 °F (37 °C)  Pulse:  [62-64] 64  Resp:  [14-17] 15  SpO2:  [93 %-95 %] 95 %  BP: (123-126)/(58-71) 123/58     Weight: 70.5 kg (155 lb 6.8 oz)  Body mass index is 26.68 kg/m².       Physical Exam       Palliative Exam    Advance Care Planning   Advance Care Planning       Significant Labs: None  CBC:   Recent Labs   Lab 04/29/25  0526   WBC 6.38   HGB 11.5*   HCT 37.3   MCV 92        BMP:  No results for input(s): "GLU", "NA", "K", "CL", "CO2", "BUN", "CREATININE", "CALCIUM", "MG" in the last 24 hours.  LFT:  Lab Results   Component Value Date    AST 47 (H) 04/29/2025    ALKPHOS 107 04/29/2025    BILITOT 0.5 04/29/2025     Albumin:   Albumin   Date Value Ref Range Status   04/29/2025 2.2 (L) 3.5 - 5.2 g/dL Final   02/18/2025 2.8 (L) 3.5 - 5.2 g/dL Final     Protein:   Protein Total   Date Value Ref Range Status   04/29/2025 5.4 (L) 6.0 - 8.4 gm/dL Final     Total Protein   Date Value Ref Range Status   02/18/2025 6.0 6.0 - 8.4 g/dL Final     Lactic acid:   Lab Results   Component Value Date    " LACTATE 1.6 04/21/2025    LACTATE 0.8 04/20/2025       Significant Imaging: None

## 2025-05-03 NOTE — PROGRESS NOTES
Patient on hospice/ palliative unit. Liberalize diet in line with goals of care. RD to sign off.    Lavinia Guadalupe RD, LDN

## 2025-05-03 NOTE — PLAN OF CARE
Problem: Skin Injury Risk Increased  Goal: Skin Health and Integrity  Outcome: Progressing     Problem: Adult Inpatient Plan of Care  Goal: Plan of Care Review  Outcome: Progressing  Goal: Patient-Specific Goal (Individualized)  Outcome: Progressing  Goal: Absence of Hospital-Acquired Illness or Injury  Outcome: Progressing  Goal: Optimal Comfort and Wellbeing  Outcome: Progressing  Goal: Readiness for Transition of Care  Outcome: Progressing     Problem: Fall Injury Risk  Goal: Absence of Fall and Fall-Related Injury  Outcome: Progressing     Problem: Wound  Goal: Optimal Wound Healing  Outcome: Progressing     Problem: Acute Kidney Injury/Impairment  Goal: Fluid and Electrolyte Balance  Outcome: Progressing  Goal: Effective Renal Function  Outcome: Progressing     Problem: Coping Ineffective  Goal: Effective Coping  Outcome: Progressing     Problem: Palliative Care  Goal: Enhanced Quality of Life  Outcome: Progressing

## 2025-05-03 NOTE — ASSESSMENT & PLAN NOTE
Impression:  Misa Barajas is a 79 y.o. female with PMHx of COPD on 2L home O2, HFpEF, AFL s/p RFA (6/17/24 w/ Dr. Church), CKD III, HTN, HLD, chronic venous insufficiency, orthostatic hypotension who was brought via EMS after per history she was found confused and bradycardiac with HR in the 20s. Patient was percutaneously paced. During hospitalization, was intubated then extubated with residual hoarseness of voice (ENT following), required intermittent pacing by TVP, a Micra PM by EP placed 04/10, aggressive diuresing, recurrent sepsis and hypothermia, patient has required several trials of antibiotics, anticipating WBC scan on 4/25 (ID following), and presumed PE (unable to verify with CTA secondary to MARQUEZ).      Of note, patient has had 7x ED visits in the last year. Patient has engaged in goals of care discussions with Dr. Velasquez of Palliative Medicine for goals of care and symptom management. Palliative Medicine was consulted by primary, Dr. Antunez, for goals of care and advanced care planning discussions.    Patient is currently resting in bed, AAOx4, daughters (Sirisha and Esther) at bedside. Patient and family are amenable to [facility]. [facility] APRN and LCSW at bedside for goals of care discussions.     Transferred to [facility] on 4/29 after transitioning to comfort focused care.    - Prior experience with serious illness: yes  -The patient has previously engaged in advance care planning or GOC discussions  - Insight/Understanding of illness: yes      Advance Care Planning     Date: 04/29/2025  - Family meeting at bedside with Esther Grimm, Dr. Antunez, and HAFSA Hdz DNP.   - Philosophies of hospice reviewed.   - Patient and family amenable to pivoting to comfort-focus care and transferring to [facility].  - Case discussed with [facility] Attending, Dr. Mayo and case management team.   - Transfer orders placed. Elyssa notified via telephone.     Date: 04/28/2025  - Patient resting in bed, 4L O2, jeffrey hugger warming  "blanket in place, no acute distress upon my visit. Patient's daughters arrive at bedside shortly after.   - Daughters requested we meet outside of room so that we do not disturb patient's rest. I met with patient's daughter in 3rd floor Visitor's [facility].  - We discussed, patient's recent hospital encounters, current hospitalization, and patient's progressive health decline.   - Daughters acknowledge patient's journey in/out of hospital for past year has been tiring and exhausting and are interested in exploring hospice options. Sirisha states she has been trying to juggle working and caring for her mom after her work day, but is limited in the care that she gives her mom because she has no past medical background. Esther states she has been fortunate to be able to work from home to be with her mom more often. Both daughters acknowledged the emotional strain caring for their mom has caused. Daughters state when something "goes wrong", they try to do as much as they can (limited to checking a blood pressure, O2 sats, and giving a dose of zofran). Once they have exhausted those interventions, they call 911 because they know they are "in over their heads" to get additional help. Patient is then admitted to hospital for additional workup. Sirisha states that it has been so traumatizing when she sees her mom is sick, tries to do everything she can to care for her mom, notices it is not working, and hears her mom begging for help, but refuses to go to the hospital. This has created immense stress on the daughters. Patient never wants to go to the hospital, but eventually agrees to come to ED because either daughters convince her or because patient becomes too sick to independently make a decision. Sirisha states she is worried that she is "giving up" on her mom even though patient has been "fighting" for so long. Philosophies of hospice introduced and discussed. Esther states she has been caring for and thinking about how her " "mom has progressively declined over the last year and expresses desires to prioritize patient's comfort knowing that with comfort being the priority, life will be limited. Daughters acknowledged patient wants to go home, but state they do not have the resources to continue to care for the patient at home. Daughters inquired about inpatient hospice unit, discussed in-patient hospice criteria. During conversation, daughters became appropriately tearful. Emotional support provided. Daughters asked for additional time to process steps forward and where the patient would continued to be cared for.   - After speaking with the daughters, I rounded on the patient again. Patient was awake and amenable to [facility]. Patient acknowledges her health decline and states it has been taxing and tiring as she has had multiple hospitalization, including the current encounter. Patient agrees that her comfort has not been prioritized over the last year and if open to talking more about hospice care. Philosophies of hospice were introduced and discussed. Patient is accepting, and states she would like to go home. I informed patient that daughters expressed concerns being able to care for patient and would like to talk more about facility placement/in-patient hospice unit. Patient expressed that she was overwhelmed with the conversation and describes it as "a lot." Patient acknowledges she was not expecting to be having this [hospice] conversation so soon, but understands that we need to have it because she has not had much quality of life lately. Patient states she would like additional time to talk with her daughters.   - Tentative family meeting 4/29/30 at 0900. Primary team, Dr. Antunez, updated via secure chat.    Date: 04/25/2025  - No acute events overnight. Patient anticipates NM Inflammatory Whole Body scan today.  - Revisited goals of care discussion from yesterday. Patient acknowledged it was a difficult conversation to have, but " has been open to it. As we anticipate more information from the pending studies, goals of care discussions are also pending. Patient expressed concerns regarding nuclear scan and when it will be getting done. Patient states she thought it would have been done by now. I followed up with radiology to inquire about scan time, and updated patient on the 1-3pm time frame that radiology hopes to see patient. Patient's daughter Esther was at bedside and updated.   - Patient with no needs at this time.  - Primary team updated.        Date: 04/24/2025    Code Status  In light of the patients advanced and life limiting illness, I engaged the patient and family in a voluntary conversation about the patient's preferences for care  at the very end of life. The patient wishes to have a natural, peaceful death.  Along those lines, the patient does not wish to have CPR or other invasive treatments performed when her heart and/or breathing stops. I communicated to the patient and family that a DNR order would be placed in her medical record to reflect this preference.        [facility]  I engaged the patient and family in a voluntary conversation about advance care planning and we specifically addressed what the goals of care would be moving forward, in light of the patient's change in clinical status, specifically recurrent hospitalizations over past year, hypothermia, acute renal failure, CHF exacerbation, and current hosptialization.  We did specifically address the patient's likely prognosis, which appears to be poor.  We explored the patient's values and preferences for future care.  The patient and family endorses that what is most important right now is to focus on spending time at home, avoiding the hospital, quality of life, even if it means sacrificing a little time, and allowing additional time for infectious workup    Accordingly, we have decided that the best plan to meet the patient's goals includes continuing with treatment  and continuing goals of care discussions pending infectious workup               Life Limiting Diagnosis  Acute on Chronic CHF  Acute Renal Failure on CKD stage 3b  Complete Heart Block    Symptom Management  - Pain: no  - acetaminophen 650mg q6h PRN    - Dyspnea: no  - on 2L at home  - maintained on 4L O2    - Constipation: no  - senna-docusate daily PRN  - poluethylene glycol daily PRN    - Nausea: no  - ondansetron 8mg q8h    - Debility: yes  - Functional status: fair.  Pt was not able to manage ADLs  - Fall precautions  - PT/OT recommending moderate intensity therapy          Plan:   - Code status: DNR  - Disposition: working for NH with hospice vs home hospice with caregivers; family exploring options and visited nearby facility  - comfort focused care plan

## 2025-05-03 NOTE — PROGRESS NOTES
[facility]  Palliative Medicine  Progress Note    Patient Name: Misa Barajas  MRN: [id number]  Admission Date: 4/6/2025  Hospital Length of Stay: 27 days  Code Status: DNR   Attending Provider: Chema Robles, *  Consulting Provider: Chema Robles DO  Primary Care Physician: Isela Langston MD  Principal Problem:Palliative care encounter    Patient information was obtained from relative(s) and past medical records.      Assessment/Plan:     Palliative Care  * Palliative care encounter  Impression:  Misa Barajas is a 79 y.o. female with PMHx of COPD on 2L home O2, HFpEF, AFL s/p RFA (6/17/24 w/ Dr. Church), CKD III, HTN, HLD, chronic venous insufficiency, orthostatic hypotension who was brought via EMS after per history she was found confused and bradycardiac with HR in the 20s. Patient was percutaneously paced. During hospitalization, was intubated then extubated with residual hoarseness of voice (ENT following), required intermittent pacing by TVP, a Micra PM by EP placed 04/10, aggressive diuresing, recurrent sepsis and hypothermia, patient has required several trials of antibiotics, anticipating WBC scan on 4/25 (ID following), and presumed PE (unable to verify with CTA secondary to MARQUEZ).      Of note, patient has had 7x ED visits in the last year. Patient has engaged in goals of care discussions with Dr. Velasquez of Palliative Medicine for goals of care and symptom management. Palliative Medicine was consulted by primary, Dr. Antunez, for goals of care and advanced care planning discussions.    Patient is currently resting in bed, AAOx4, daughters (Sirisha and Esther) at bedside. Patient and family are amenable to [facility]. [facility] APRN and LCSW at bedside for goals of care discussions.     Transferred to [facility] on 4/29 after transitioning to comfort focused care.    - Prior experience with serious illness: yes  -The patient has previously engaged in advance care  "planning or GOC discussions  - Insight/Understanding of illness: yes      Advance Care Planning    Date: 04/29/2025  - Family meeting at bedside with Esther Grimm, Dr. Antunez, and HAFSA Hdz DNP.   - Philosophies of hospice reviewed.   - Patient and family amenable to pivoting to comfort-focus care and transferring to [facility].  - Case discussed with [facility] Attending, Dr. Mayo and case management team.   - Transfer orders placed. Sirisha and Esther notified via telephone.     Date: 04/28/2025  - Patient resting in bed, 4L O2, jeffrey hugger warming blanket in place, no acute distress upon my visit. Patient's daughters arrive at bedside shortly after.   - Daughters requested we meet outside of room so that we do not disturb patient's rest. I met with patient's daughter in 3rd floor Vis[facility]'s [facility].  - We discussed, patient's recent hospital encounters, current hospitalization, and patient's progressive health decline.   - Daughters acknowledge patient's journey in/out of hospital for past year has been tiring and exhausting and are interested in exploring hospice options. Sirisha states she has been trying to juggle working and caring for her mom after her work day, but is limited in the care that she gives her mom because she has no past medical background. Esther states she has been fortunate to be able to work from home to be with her mom more often. Both daughters acknowledged the emotional strain caring for their mom has caused. Daughters state when something "goes wrong", they try to do as much as they can (limited to checking a blood pressure, O2 sats, and giving a dose of zofran). Once they have exhausted those interventions, they call 911 because they know they are "in over their heads" to get additional help. Patient is then admitted to hospital for additional workup. Sirisha states that it has been so traumatizing when she sees her mom is sick, tries to do everything she can to care for her mom, notices it is not " "working, and hears her mom begging for help, but refuses to go to the hospital. This has created immense stress on the daughters. Patient never wants to go to the hospital, but eventually agrees to come to ED because either daughters convince her or because patient becomes too sick to independently make a decision. Sirisha states she is worried that she is "giving up" on her mom even though patient has been "fighting" for so long. Philosophies of hospice introduced and discussed. Esther states she has been caring for and thinking about how her mom has progressively declined over the last year and expresses desires to prioritize patient's comfort knowing that with comfort being the priority, life will be limited. Daughters acknowledged patient wants to go home, but state they do not have the resources to continue to care for the patient at home. Daughters inquired about inpatient hospice unit, discussed in-patient hospice criteria. During conversation, daughters became appropriately tearful. Emotional support provided. Daughters asked for additional time to process steps forward and where the patient would continued to be cared for.   - After speaking with the daughters, I rounded on the patient again. Patient was awake and amenable to Pal [facility]. Patient acknowledges her health decline and states it has been taxing and tiring as she has had multiple hospitalization, including the current encounter. Patient agrees that her comfort has not been prioritized over the last year and if open to talking more about hospice care. Philosophies of hospice were introduced and discussed. Patient is accepting, and states she would like to go home. I informed patient that daughters expressed concerns being able to care for patient and would like to talk more about facility placement/in-patient hospice unit. Patient expressed that she was overwhelmed with the conversation and describes it as "a lot." Patient acknowledges she was not " expecting to be having this [hospice] conversation so soon, but understands that we need to have it because she has not had much quality of life lately. Patient states she would like additional time to talk with her daughters.   - Tentative family meeting 4/29/30 at 0900. Primary team, Dr. Antunez, updated via secure chat.    Date: 04/25/2025  - No acute events overnight. Patient anticipates NM Inflammatory Whole Body scan today.  - Revisited goals of care discussion from yesterday. Patient acknowledged it was a difficult conversation to have, but has been open to it. As we anticipate more information from the pending studies, goals of care discussions are also pending. Patient expressed concerns regarding nuclear scan and when it will be getting done. Patient states she thought it would have been done by now. I followed up with radiology to inquire about scan time, and updated patient on the 1-3pm time frame that radiology hopes to see patient. Patient's daughter Esther was at bedside and updated.   - Patient with no needs at this time.  - Primary team updated.        Date: 04/24/2025    Code Status  In light of the patients advanced and life limiting illness, I engaged the patient and family in a voluntary conversation about the patient's preferences for care  at the very end of life. The patient wishes to have a natural, peaceful death.  Along those lines, the patient does not wish to have CPR or other invasive treatments performed when her heart and/or breathing stops. I communicated to the patient and family that a DNR order would be placed in her medical record to reflect this preference.        [facility] [place]  I engaged the patient and family in a voluntary conversation about advance care planning and we specifically addressed what the goals of care would be moving forward, in light of the patient's change in clinical status, specifically recurrent hospitalizations over past year, hypothermia, acute renal failure, CHF  "exacerbation, and current hosptialization.  We did specifically address the patient's likely prognosis, which appears to be poor.  We explored the patient's values and preferences for future care.  The patient and family endorses that what is most important right now is to focus on spending time at home, avoiding the hospital, quality of life, even if it means sacrificing a little time, and allowing additional time for infectious workup    Accordingly, we have decided that the best plan to meet the patient's goals includes continuing with treatment and continuing goals of care discussions pending infectious workup               Life Limiting Diagnosis  Acute on Chronic CHF  Acute Renal Failure on CKD stage 3b  Complete Heart Block    Symptom Management  - Pain: no  - acetaminophen 650mg q6h PRN    - Dyspnea: no  - on 2L at home  - maintained on 4L O2    - Constipation: no  - senna-docusate daily PRN  - poluethylene glycol daily PRN    - Nausea: no  - ondansetron 8mg q8h    - Debility: yes  - Functional status: fair.  Pt was not able to manage ADLs  - Fall precautions  - PT/OT recommending moderate intensity therapy          Plan:   - Code status: DNR  - Disposition: working for NH with hospice vs home hospice with caregivers; family exploring options and visited nearby facility  - comfort focused care plan              Subjective:     Chief Complaint:   Chief Complaint   Patient presents with    Bradycardia     Arrives via EMS hypotensive 80s/50s and bradycadic in the 20s.       HPI:   As per H&P, "Misa Barajas is a 79 y.o. female COPD on 2L home O2, HFpEF, AFL s/p RFA (6/17/24 w/ Dr. Church), CKD III, HTN, HLD, chronic venous insufficiency, orthostatic hypotension who was brought via EMS after per history she was found confused and bradycardiac with HR in the 20s.They started epi and transcutaneous pacing. Given Versed for the transcutaneous pacing. They had to bag her in route. We do not have strip of " underlying rhythm. Patient obtunded for which she was intubated for airway protection on arrival to the ED and transcutaneous pacing was exchanged to transvenous pacing via RIJ (threshold 0.6). Underlying rate 44 bifascicular block. Known RBBB. Per daughters patient had called them over the phone when they noticed she appeared confused. Due to concerns for a possible syncopal episode EMS was called. Prior to event patient had been complaining of malaise and nausea for which she took about two Zofran pills per the daughters.      She had been recently seen on 3/27/25 at the [facility] visit following recent admission for AHRF 2/2 ADHF and COPD exacerbation. On ED presentation BNP elevated to 2500, troponin elevated to 19, CXR with pulmonary edema. She was started on steroids, nebs and antibiotics in addition to IV diuresis. Updated TTE stable from prior, showing HFpEF with elevated CVP 15. Her O2 requirements improved to baseline with diuresis and she was subsequently discharged. At that visit patient had not required midodrine as blood pressure had remained stable and she was asymptomatic.      Off note, patient was taken off OAC due to bleeding issues and no recurrent of typical atrial flutter after ablation.      Overview/Hospital Course:  Admitted to CCU for CHB in s/o hyperkalemia & in the presence of conduction disease/RBBB & LAHB - stable w/ temporary wire. ??Septic shock deemed to be d/t UTI - ?? UA with only 7 WBCs); vasopressors weaned off. HTN; started NG gtt. Acute on chronic hypoxic/hypercapnic RF & obtunded; intubated. Successfully extubated 04/08. MARQUEZ on CKD; improved w/ good UOP response to diuresis. PVCs w/ intermittent pacing by TVP. Switched pip-tazo to amox-clav on 04/09; continued to improve; empiric EED 04/13 for 7 day course. Micra PM by EP placed 04/10. Medically stable for stepdown. Patient was extremely short of breath with physical therapy, chest x-ray concerning for pulmonary edema.  Started  "patient on IV diuresis, now euvolemic.  ENT was consulted yesterday due to hoarseness of voice.  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 Pt uninterested in any procedure at this time, ENT recommended SLP follow up.  Able to tolerate regular diet at this point.  Now agreeable for SNF, working on placement. Course c/b worsening hypoxia & hyperNa.       Overnight 4/18-4/19, decompensated with recurrent sepsis, hypothermia 92, possible PNA with infiltrates on CXR, Zosyn IV started. Remained hypothermic despite Zosyn x 2 doses and Jeffrey hugger in place (pt uncomfortable/hot and asking for removal), so added Vanc IV STAT. Checked TSH and FT4-  wnl. UA with pyuria however not a clean catch w 33 sq epi, so repeated UA. C/o "stomach" pain, overall picture with the "stomach" pain and septic picture is "exactly what led us to ICU 2 weeks ago" per daughter. +TTP RUQ, RLQ, LLQ.   Poor UOP, only 200cc by mid-shift, and still hyperNa with FWD 1.8L so started gentle hydration with D5W @ 75cc for 24 hrs. May need to diurese again after sepsis resolves. CT C/A/P noncon with pulm edema, B pleural effusions, possible PNA, no intra-abdominal process seen.      On 4/21, Mental status much improved, however still with mild confusion and feels very tired.   decompensated further with recurrent MARQUEZ, C/o new SOB and hypoxemic 90% on 4L (over her baseline of 2L at home), improved to >94% on 6L with improvement of SOB.  C/o new L chest pressure/pain 4/10 (non-radiating), c/f MI or PE, resolved after nitro SL x 1 however HypoT with SBP upper 80s so will avoid further nitro.  HS trop 38 (decreased from prior), BNP 700s (increased from prior).CXR with pulm edema, all most c/w ADHF- Lasix 40 IV x 1 given.    Concern for untreated sepsis with ongoing hypothermia requiring jeffrey hugger despite Vanc/Zosyn for over 24 hours, so broadened to Vanc/Sher/Dinah, consulted ID, repeated BCx x 2. Lactate wnl.  Re: " "concern for PE, Known LIJ DVT, apixaban x several days then stopped (likely for hemorrhage of vocal fold)- discussed w ENT 4/20, ok to resume anticoag if needed. PE workup- F/u STAT UE and LE Dopplers. Unable to check CTA chest given MARQUEZ, unable to check V/Q given pulm edema. Anticoagulation with Apixaban.  Hypothermia returned however pt looks and feels the best she has in 4 days.  Voice is getting stronger.  Sitting in chair.  UCx resulted with ESBL Klebsiella, so Sher has been good coverage.  ID changed to Dapto (from Vanc), continuing Sher, for coverage of UTI and well as PNA.  BCx have all been negative.  Continuing Eliquis for DVt LUE and presumed PE.  Acute hypox resp failure 2/2 ADHF and presumed PE, still on 6L, dosing Lasix IV PRN daily given yet-unclear status of her sepsis and avoiding volume depletion in sepsis.      Prior to admit, was independent in her residence with her cat.  Two daughters very supportive.         Interval History: Pt seen and examined this morning on rounds. Hypothermia ongoing this morning, required Manny hugger.  Resolved in afternoon.  If hypothermia returns overnight, will repeat BCx x 2.  Attempting sputum sample for Cx as well, not optimistic for any yield. NM Full body tagged WBC scan per ID recs- will get injection tomorrow 4/24 then scan on 4/25.      Re-dosed Lasix at higher dose 80mg IV x 1, re-eval again tomorrow.      Pall care consulted, NOT for hospice, NOT due to a downward clinical trajectory, as pt shows signs of clinical improvement except for the hypothermia -- consult is rather for continuity and continuation of ACP/GOC started as outpt prior to admission.       Care plan reviewed. Otherwise, doing well and with no further complaints at this time."        Hospital Course:  No notes on file    Interval History: resting in bed, somewhat somnolent but will open eyes to voice and gentle stimuli.     Medications:  Continuous Infusions:  Scheduled Meds:   apixaban  5 " "mg Oral BID    levothyroxine  50 mcg Oral Before breakfast    LIDOcaine  1 patch Transdermal Q24H    metoprolol tartrate  12.5 mg Oral BID     PRN Meds:  Current Facility-Administered Medications:     acetaminophen, 650 mg, Oral, Q6H PRN    lorazepam, 0.25 mg, Intravenous, Q30 Min PRN    nitroGLYCERIN, 0.4 mg, Sublingual, Q5 Min PRN    ondansetron, 8 mg, Oral, Q8H PRN    polyethylene glycol, 17 g, Oral, BID PRN    senna, 8.6 mg, Oral, Daily PRN    simethicone, 1 tablet, Oral, TID PRN    Objective:     Vital Signs (Most Recent):  Temp: 98.6 °F (37 °C) (05/03/25 0846)  Pulse: 64 (05/03/25 0846)  Resp: 15 (05/03/25 1044)  BP: (!) 123/58 (05/03/25 0846)  SpO2: 95 % (05/03/25 0846) Vital Signs (24h Range):  Temp:  [97.5 °F (36.4 °C)-98.6 °F (37 °C)] 98.6 °F (37 °C)  Pulse:  [62-64] 64  Resp:  [14-17] 15  SpO2:  [93 %-95 %] 95 %  BP: (123-126)/(58-71) 123/58     Weight: 70.5 kg (155 lb 6.8 oz)  Body mass index is 26.68 kg/m².       Physical Exam       Palliative Exam    Advance Care Planning  Advance Care Planning       Significant Labs: None  CBC:   Recent Labs   Lab 04/29/25  0526   WBC 6.38   HGB 11.5*   HCT 37.3   MCV 92        BMP:  No results for input(s): "GLU", "NA", "K", "CL", "CO2", "BUN", "CREATININE", "CALCIUM", "MG" in the last 24 hours.  LFT:  Lab Results   Component Value Date    AST 47 (H) 04/29/2025    ALKPHOS 107 04/29/2025    BILITOT 0.5 04/29/2025     Albumin:   Albumin   Date Value Ref Range Status   04/29/2025 2.2 (L) 3.5 - 5.2 g/dL Final   02/18/2025 2.8 (L) 3.5 - 5.2 g/dL Final     Protein:   Protein Total   Date Value Ref Range Status   04/29/2025 5.4 (L) 6.0 - 8.4 gm/dL Final     Total Protein   Date Value Ref Range Status   02/18/2025 6.0 6.0 - 8.4 g/dL Final     Lactic acid:   Lab Results   Component Value Date    LACTATE 1.6 04/21/2025    LACTATE 0.8 04/20/2025       Significant Imaging: None    Chema Robles, DO  Palliative Medicine  [facility] - H[facility] and Palliative " Medicine

## 2025-05-03 NOTE — PLAN OF CARE
Problem: Adult Inpatient Plan of Care  Goal: Plan of Care Review  Outcome: Adequate for Care Transition  Goal: Patient-Specific Goal (Individualized)  Outcome: Adequate for Care Transition  Goal: Absence of Hospital-Acquired Illness or Injury  Outcome: Adequate for Care Transition  Goal: Optimal Comfort and Wellbeing  Outcome: Adequate for Care Transition  Goal: Readiness for Transition of Care  Outcome: Adequate for Care Transition     Problem: Fall Injury Risk  Goal: Absence of Fall and Fall-Related Injury  Outcome: Progressing     Problem: Infection  Goal: Absence of Infection Signs and Symptoms  Outcome: Met     Problem: Wound  Goal: Optimal Coping  Outcome: Met  Goal: Optimal Functional Ability  Outcome: Met  Goal: Absence of Infection Signs and Symptoms  Outcome: Met  Goal: Improved Oral Intake  Outcome: Met  Goal: Optimal Pain Control and Function  Outcome: Met  Goal: Skin Health and Integrity  Outcome: Met     Problem: Acute Kidney Injury/Impairment  Goal: Fluid and Electrolyte Balance  Outcome: Progressing  Goal: Improved Oral Intake  Outcome: Progressing  Goal: Effective Renal Function  Outcome: Progressing     Problem: Delirium  Goal: Optimal Coping  Outcome: Met  Goal: Improved Behavioral Control  Outcome: Met  Goal: Improved Attention and Thought Clarity  Outcome: Met  Goal: Improved Sleep  Outcome: Met     Problem: Sepsis/Septic Shock  Goal: Optimal Coping  Outcome: Met  Goal: Absence of Bleeding  Outcome: Met  Goal: Blood Glucose Level Within Targeted Range  Outcome: Met  Goal: Absence of Infection Signs and Symptoms  Outcome: Met  Goal: Optimal Nutrition Intake  Outcome: Met     Problem: Coping Ineffective  Goal: Effective Coping  Outcome: Progressing     Problem: Palliative Care  Goal: Enhanced Quality of Life  Outcome: Progressing

## 2025-05-04 NOTE — PLAN OF CARE
Problem: Skin Injury Risk Increased  Goal: Skin Health and Integrity  Outcome: Progressing     Problem: Fall Injury Risk  Goal: Absence of Fall and Fall-Related Injury  Outcome: Progressing     Problem: Coping Ineffective  Goal: Effective Coping  Outcome: Progressing     Problem: Palliative Care  Goal: Enhanced Quality of Life  Outcome: Progressing

## 2025-05-04 NOTE — ASSESSMENT & PLAN NOTE
Impression:  Misa Barajas is a 79 y.o. female with PMHx of COPD on 2L home O2, HFpEF, AFL s/p RFA (6/17/24 w/ Dr. Church), CKD III, HTN, HLD, chronic venous insufficiency, orthostatic hypotension who was brought via EMS after per history she was found confused and bradycardiac with HR in the 20s. Patient was percutaneously paced. During hospitalization, was intubated then extubated with residual hoarseness of voice (ENT following), required intermittent pacing by TVP, a Micra PM by EP placed 04/10, aggressive diuresing, recurrent sepsis and hypothermia, patient has required several trials of antibiotics, anticipating WBC scan on 4/25 (ID following), and presumed PE (unable to verify with CTA secondary to MARQUEZ).      Of note, patient has had 7x ED visits in the last year. Patient has engaged in goals of care discussions with Dr. Velasquez of Palliative Medicine for goals of care and symptom management. Palliative Medicine was consulted by primary, Dr. Antunez, for goals of care and advanced care planning discussions.    Patient is currently resting in bed, AAOx4, daughters (Sirisha and Esther) at bedside. Patient and family are amenable to [facility]. Pal [facility] APRN and LCSW at bedside for goals of care discussions.     Transferred to [facility] on 4/29 after transitioning to comfort focused care.    - Prior experience with serious illness: yes  -The patient has previously engaged in advance care planning or GOC discussions  - Insight/Understanding of illness: yes      Advance Care Planning     Date: 04/29/2025  - Family meeting at bedside with Esther Grimm, Dr. Antunez, and HAFSA Hdz DNP.   - Philosophies of hospice reviewed.   - Patient and family amenable to pivoting to comfort-focus care and transferring to [facility].  - Case discussed with [facility] Attending, Dr. Mayo and case management team.   - Transfer orders placed. Elyssa notified via telephone.     Date: 04/28/2025  - Patient resting in bed, 4L O2, jeffrey hugger warming  "blanket in place, no acute distress upon my visit. Patient's daughters arrive at bedside shortly after.   - Daughters requested we meet outside of room so that we do not disturb patient's rest. I met with patient's daughter in 3rd floor Vis[facility]'s [facility].  - We discussed, patient's recent hospital encounters, current hospitalization, and patient's progressive health decline.   - Daughters acknowledge patient's journey in/out of hospital for past year has been tiring and exhausting and are interested in exploring hospice options. Sirisha states she has been trying to juggle working and caring for her mom after her work day, but is limited in the care that she gives her mom because she has no past medical background. Esther states she has been fortunate to be able to work from home to be with her mom more often. Both daughters acknowledged the emotional strain caring for their mom has caused. Daughters state when something "goes wrong", they try to do as much as they can (limited to checking a blood pressure, O2 sats, and giving a dose of zofran). Once they have exhausted those interventions, they call 911 because they know they are "in over their heads" to get additional help. Patient is then admitted to hospital for additional workup. Sirisha states that it has been so traumatizing when she sees her mom is sick, tries to do everything she can to care for her mom, notices it is not working, and hears her mom begging for help, but refuses to go to the hospital. This has created immense stress on the daughters. Patient never wants to go to the hospital, but eventually agrees to come to ED because either daughters convince her or because patient becomes too sick to independently make a decision. Sirisha states she is worried that she is "giving up" on her mom even though patient has been "fighting" for so long. Philosophies of hospice introduced and discussed. Esther states she has been caring for and thinking about how her " "mom has progressively declined over the last year and expresses desires to prioritize patient's comfort knowing that with comfort being the priority, life will be limited. Daughters acknowledged patient wants to go home, but state they do not have the resources to continue to care for the patient at home. Daughters inquired about inpatient hospice unit, discussed in-patient hospice criteria. During conversation, daughters became appropriately tearful. Emotional support provided. Daughters asked for additional time to process steps forward and where the patient would continued to be cared for.   - After speaking with the daughters, I rounded on the patient again. Patient was awake and amenable to [facility]. Patient acknowledges her health decline and states it has been taxing and tiring as she has had multiple hospitalization, including the current encounter. Patient agrees that her comfort has not been prioritized over the last year and if open to talking more about hospice care. Philosophies of hospice were introduced and discussed. Patient is accepting, and states she would like to go home. I informed patient that daughters expressed concerns being able to care for patient and would like to talk more about facility placement/in-patient hospice unit. Patient expressed that she was overwhelmed with the conversation and describes it as "a lot." Patient acknowledges she was not expecting to be having this [hospice] conversation so soon, but understands that we need to have it because she has not had much quality of life lately. Patient states she would like additional time to talk with her daughters.   - Tentative family meeting 4/29/30 at 0900. Primary team, Dr. Antunez, updated via secure chat.    Date: 04/25/2025  - No acute events overnight. Patient anticipates NM Inflammatory Whole Body scan today.  - Revisited goals of care discussion from yesterday. Patient acknowledged it was a difficult conversation to have, but " has been open to it. As we anticipate more information from the pending studies, goals of care discussions are also pending. Patient expressed concerns regarding nuclear scan and when it will be getting done. Patient states she thought it would have been done by now. I followed up with radiology to inquire about scan time, and updated patient on the 1-3pm time frame that radiology hopes to see patient. Patient's daughter Esther was at bedside and updated.   - Patient with no needs at this time.  - Primary team updated.                  05/04/2025    [facility]  I engaged the patient and family in a voluntary conversation about advance care planning and we specifically addressed what the goals of care would be moving forward, in light of the patient's change in clinical status, specifically recurrent hospitalizations over past year, hypothermia, acute renal failure, CHF exacerbation, and current hosptialization.  We did specifically address the patient's likely prognosis, which appears to be poor.  We explored the patient's values and preferences for future care.  The patient and family endorses that what is most important right now is to focus on spending time at home, avoiding the hospital, and quality of life, even if it means sacrificing a little time    Accordingly, we have decided that the best plan to meet the patient's goals includes pivot to comfort-focused care           Interval: Patient less alert/responsive today. Requiring one dose of PRN morphine 4mg IV at 1600 for pain. Will sit up and take sips of water/boost, else minimal PO intake    Life Limiting Diagnosis  Acute on Chronic CHF  Acute Renal Failure on CKD stage 3b  Complete Heart Block    Symptom Management  - Pain: no  - acetaminophen 650mg q6h PRN    - Dyspnea: no  - on 2L at home  - maintained on 4L O2    - Constipation: no  - senna-docusate daily PRN  - poluethylene glycol daily PRN    - Nausea: no  - ondansetron 8mg q8h    - Debility: yes  -  Functional status: fair.  Pt was not able to manage ADLs  - Fall precautions  - PT/OT recommending moderate intensity therapy          Plan:   - Code status: DNR  - Disposition: working for NH with hospice vs home hospice with caregivers; family exploring options and visited nearby facility  - comfort focused care plan

## 2025-05-04 NOTE — PLAN OF CARE
Problem: Skin Injury Risk Increased  Goal: Skin Health and Integrity  Outcome: Progressing     Problem: Adult Inpatient Plan of Care  Goal: Plan of Care Review  Outcome: Met  Goal: Patient-Specific Goal (Individualized)  Outcome: Met  Goal: Absence of Hospital-Acquired Illness or Injury  Outcome: Met  Goal: Optimal Comfort and Wellbeing  Outcome: Met  Goal: Readiness for Transition of Care  Outcome: Met     Problem: Fall Injury Risk  Goal: Absence of Fall and Fall-Related Injury  Outcome: Progressing     Problem: Wound  Goal: Optimal Wound Healing  Outcome: Met     Problem: Acute Kidney Injury/Impairment  Goal: Fluid and Electrolyte Balance  Outcome: Met  Goal: Improved Oral Intake  Outcome: Met  Goal: Effective Renal Function  Outcome: Met     Problem: Coping Ineffective  Goal: Effective Coping  Outcome: Progressing     Problem: Palliative Care  Goal: Enhanced Quality of Life  Outcome: Progressing

## 2025-05-04 NOTE — SUBJECTIVE & OBJECTIVE
"Interval History: resting in bed, somnolent with simple and confused reply but will open eyes to voice and gentle stimuli.     Medications:  Continuous Infusions:  Scheduled Meds:   apixaban  5 mg Oral BID    levothyroxine  50 mcg Oral Before breakfast    LIDOcaine  1 patch Transdermal Q24H    metoprolol tartrate  12.5 mg Oral BID     PRN Meds:  Current Facility-Administered Medications:     acetaminophen, 650 mg, Oral, Q6H PRN    lorazepam, 0.25 mg, Intravenous, Q30 Min PRN    morphine, 4 mg, Intravenous, Q4H PRN    nitroGLYCERIN, 0.4 mg, Sublingual, Q5 Min PRN    ondansetron, 8 mg, Oral, Q8H PRN    polyethylene glycol, 17 g, Oral, BID PRN    senna, 8.6 mg, Oral, Daily PRN    simethicone, 1 tablet, Oral, TID PRN    Objective:     Vital Signs (Most Recent):  Temp: 97 °F (36.1 °C) (05/03/25 2020)  Pulse: 69 (05/04/25 0733)  Resp: 14 (05/04/25 0733)  BP: 139/72 (05/04/25 0733)  SpO2: 96 % (05/04/25 0733) Vital Signs (24h Range):  Temp:  [97 °F (36.1 °C)] 97 °F (36.1 °C)  Pulse:  [64-69] 69  Resp:  [8-14] 14  SpO2:  [94 %-96 %] 96 %  BP: ()/(51-72) 139/72     Weight: 70.5 kg (155 lb 6.8 oz)  Body mass index is 26.68 kg/m².       Physical Exam       Review of Symptoms      Symptom Assessment (ESAS 0-10 Scale)  Pain:  0  Dyspnea:  0  Anxiety:  0  Nausea:  0  Depression:  0  Anorexia:  0  Fatigue:  0  Insomnia:  0  Restlessness:  0  Agitation:  0         Psychosocial/Cultural:   See Palliative Psychosocial Note: Yes  **Primary  to Follow**  Palliative Care  Consult: Yes        Advance Care Planning   Advance Care Planning       Significant Labs: None  CBC:   Recent Labs   Lab 04/29/25  0526   WBC 6.38   HGB 11.5*   HCT 37.3   MCV 92        BMP:  No results for input(s): "GLU", "NA", "K", "CL", "CO2", "BUN", "CREATININE", "CALCIUM", "MG" in the last 24 hours.  LFT:  Lab Results   Component Value Date    AST 47 (H) 04/29/2025    ALKPHOS 107 04/29/2025    BILITOT 0.5 04/29/2025 "     Albumin:   Albumin   Date Value Ref Range Status   04/29/2025 2.2 (L) 3.5 - 5.2 g/dL Final   02/18/2025 2.8 (L) 3.5 - 5.2 g/dL Final     Protein:   Protein Total   Date Value Ref Range Status   04/29/2025 5.4 (L) 6.0 - 8.4 gm/dL Final     Total Protein   Date Value Ref Range Status   02/18/2025 6.0 6.0 - 8.4 g/dL Final     Lactic acid:   Lab Results   Component Value Date    LACTATE 1.6 04/21/2025    LACTATE 0.8 04/20/2025       Significant Imaging: None

## 2025-05-04 NOTE — PROGRESS NOTES
[facility]  Palliative Medicine  Progress Note    Patient Name: Misa Barajas  MRN: [id number]  Admission Date: 4/6/2025  Hospital Length of Stay: 28 days  Code Status: DNR   Attending Provider: Chema Robles, *  Consulting Provider: Chema Robles DO  Primary Care Physician: Isela Langston MD  Principal Problem:Palliative care encounter    Patient information was obtained from relative(s) and past medical records.      Assessment/Plan:     Palliative Care  * Palliative care encounter  Impression:  Misa Barajas is a 79 y.o. female with PMHx of COPD on 2L home O2, HFpEF, AFL s/p RFA (6/17/24 w/ Dr. Church), CKD III, HTN, HLD, chronic venous insufficiency, orthostatic hypotension who was brought via EMS after per history she was found confused and bradycardiac with HR in the 20s. Patient was percutaneously paced. During hospitalization, was intubated then extubated with residual hoarseness of voice (ENT following), required intermittent pacing by TVP, a Micra PM by EP placed 04/10, aggressive diuresing, recurrent sepsis and hypothermia, patient has required several trials of antibiotics, anticipating WBC scan on 4/25 (ID following), and presumed PE (unable to verify with CTA secondary to MARQUEZ).      Of note, patient has had 7x ED visits in the last year. Patient has engaged in goals of care discussions with Dr. Velasquez of Palliative Medicine for goals of care and symptom management. Palliative Medicine was consulted by primary, Dr. Antunez, for goals of care and advanced care planning discussions.    Patient is currently resting in bed, AAOx4, daughters (Sirisha and Esther) at bedside. Patient and family are amenable to Pal [facility]. Pal Med APRN and LCSW at bedside for goals of care discussions.     Transferred to [facility] on 4/29 after transitioning to comfort focused care.    - Prior experience with serious illness: yes  -The patient has previously engaged in advance care  "planning or GOC discussions  - Insight/Understanding of illness: yes      Advance Care Planning    Date: 04/29/2025  - Family meeting at bedside with Esther Grimm, Dr. Antunez, and HAFSA Hdz DNP.   - Philosophies of hospice reviewed.   - Patient and family amenable to pivoting to comfort-focus care and transferring to [facility].  - Case discussed with [facility] Attending, Dr. Mayo and case management team.   - Transfer orders placed. Sirisha and Esther notified via telephone.     Date: 04/28/2025  - Patient resting in bed, 4L O2, jeffrey hugger warming blanket in place, no acute distress upon my visit. Patient's daughters arrive at bedside shortly after.   - Daughters requested we meet outside of room so that we do not disturb patient's rest. I met with patient's daughter in 3rd floor [facility].  - We discussed, patient's recent hospital encounters, current hospitalization, and patient's progressive health decline.   - Daughters acknowledge patient's journey in/out of hospital for past year has been tiring and exhausting and are interested in exploring hospice options. Sirisha states she has been trying to juggle working and caring for her mom after her work day, but is limited in the care that she gives her mom because she has no past medical background. Esther states she has been fortunate to be able to work from home to be with her mom more often. Both daughters acknowledged the emotional strain caring for their mom has caused. Daughters state when something "goes wrong", they try to do as much as they can (limited to checking a blood pressure, O2 sats, and giving a dose of zofran). Once they have exhausted those interventions, they call 911 because they know they are "in over their heads" to get additional help. Patient is then admitted to hospital for additional workup. Sirisha states that it has been so traumatizing when she sees her mom is sick, tries to do everything she can to care for her mom, notices it is not " "working, and hears her mom begging for help, but refuses to go to the hospital. This has created immense stress on the daughters. Patient never wants to go to the hospital, but eventually agrees to come to ED because either daughters convince her or because patient becomes too sick to independently make a decision. Sirisha states she is worried that she is "giving up" on her mom even though patient has been "fighting" for so long. Philosophies of hospice introduced and discussed. Esther states she has been caring for and thinking about how her mom has progressively declined over the last year and expresses desires to prioritize patient's comfort knowing that with comfort being the priority, life will be limited. Daughters acknowledged patient wants to go home, but state they do not have the resources to continue to care for the patient at home. Daughters inquired about inpatient hospice unit, discussed in-patient hospice criteria. During conversation, daughters became appropriately tearful. Emotional support provided. Daughters asked for additional time to process steps forward and where the patient would continued to be cared for.   - After speaking with the daughters, I rounded on the patient again. Patient was awake and amenable to Pal Med. Patient acknowledges her health decline and states it has been taxing and tiring as she has had multiple hospitalization, including the current encounter. Patient agrees that her comfort has not been prioritized over the last year and if open to talking more about hospice care. Philosophies of hospice were introduced and discussed. Patient is accepting, and states she would like to go home. I informed patient that daughters expressed concerns being able to care for patient and would like to talk more about facility placement/in-patient hospice unit. Patient expressed that she was overwhelmed with the conversation and describes it as "a lot." Patient acknowledges she was not " expecting to be having this [hospice] conversation so soon, but understands that we need to have it because she has not had much quality of life lately. Patient states she would like additional time to talk with her daughters.   - Tentative family meeting 4/29/30 at 0900. Primary team, Dr. Antunez, updated via secure chat.    Date: 04/25/2025  - No acute events overnight. Patient anticipates NM Inflammatory Whole Body scan today.  - Revisited goals of care discussion from yesterday. Patient acknowledged it was a difficult conversation to have, but has been open to it. As we anticipate more information from the pending studies, goals of care discussions are also pending. Patient expressed concerns regarding nuclear scan and when it will be getting done. Patient states she thought it would have been done by now. I followed up with radiology to inquire about scan time, and updated patient on the 1-3pm time frame that radiology hopes to see patient. Patient's daughter Esther was at bedside and updated.   - Patient with no needs at this time.  - Primary team updated.                  05/04/2025    [facility]  I engaged the patient and family in a voluntary conversation about advance care planning and we specifically addressed what the goals of care would be moving forward, in light of the patient's change in clinical status, specifically recurrent hospitalizations over past year, hypothermia, acute renal failure, CHF exacerbation, and current hosptialization.  We did specifically address the patient's likely prognosis, which appears to be poor.  We explored the patient's values and preferences for future care.  The patient and family endorses that what is most important right now is to focus on spending time at home, avoiding the hospital, and quality of life, even if it means sacrificing a little time    Accordingly, we have decided that the best plan to meet the patient's goals includes pivot to comfort-focused care        "    Interval: Patient less alert/responsive today. Requiring one dose of PRN morphine 4mg IV at 1600 for pain. Will sit up and take sips of water/boost, else minimal PO intake    Life Limiting Diagnosis  Acute on Chronic CHF  Acute Renal Failure on CKD stage 3b  Complete Heart Block    Symptom Management  - Pain: no  - acetaminophen 650mg q6h PRN    - Dyspnea: no  - on 2L at home  - maintained on 4L O2    - Constipation: no  - senna-docusate daily PRN  - poluethylene glycol daily PRN    - Nausea: no  - ondansetron 8mg q8h    - Debility: yes  - Functional status: fair.  Pt was not able to manage ADLs  - Fall precautions  - PT/OT recommending moderate intensity therapy          Plan:   - Code status: DNR  - Disposition: working for NH with hospice vs home hospice with caregivers; family exploring options and visited nearby facility  - comfort focused care plan              Subjective:     Chief Complaint:   Chief Complaint   Patient presents with    Bradycardia     Arrives via EMS hypotensive 80s/50s and bradycadic in the 20s.       HPI:   As per H&P, "Misa Barajas is a 79 y.o. female COPD on 2L home O2, HFpEF, AFL s/p RFA (6/17/24 w/ Dr. Church), CKD III, HTN, HLD, chronic venous insufficiency, orthostatic hypotension who was brought via EMS after per history she was found confused and bradycardiac with HR in the 20s.They started epi and transcutaneous pacing. Given Versed for the transcutaneous pacing. They had to bag her in route. We do not have strip of underlying rhythm. Patient obtunded for which she was intubated for airway protection on arrival to the ED and transcutaneous pacing was exchanged to transvenous pacing via RIJ (threshold 0.6). Underlying rate 44 bifascicular block. Known RBBB. Per daughters patient had called them over the phone when they noticed she appeared confused. Due to concerns for a possible syncopal episode EMS was called. Prior to event patient had been complaining of malaise and " nausea for which she took about two Zofran pills per the daughters.      She had been recently seen on 3/27/25 at the [facility] visit following recent admission for AHRF 2/2 ADHF and COPD exacerbation. On ED presentation BNP elevated to 2500, troponin elevated to 19, CXR with pulmonary edema. She was started on steroids, nebs and antibiotics in addition to IV diuresis. Updated TTE stable from prior, showing HFpEF with elevated CVP 15. Her O2 requirements improved to baseline with diuresis and she was subsequently discharged. At that visit patient had not required midodrine as blood pressure had remained stable and she was asymptomatic.      Off note, patient was taken off OAC due to bleeding issues and no recurrent of typical atrial flutter after ablation.      Overview/Hospital Course:  Admitted to CCU for CHB in s/o hyperkalemia & in the presence of conduction disease/RBBB & LAHB - stable w/ temporary wire. ??Septic shock deemed to be d/t UTI - ?? UA with only 7 WBCs); vasopressors weaned off. HTN; started NG gtt. Acute on chronic hypoxic/hypercapnic RF & obtunded; intubated. Successfully extubated 04/08. MARQUEZ on CKD; improved w/ good UOP response to diuresis. PVCs w/ intermittent pacing by TVP. Switched pip-tazo to amox-clav on 04/09; continued to improve; empiric EED 04/13 for 7 day course. Micra PM by EP placed 04/10. Medically stable for stepdown. Patient was extremely short of breath with physical therapy, chest x-ray concerning for pulmonary edema.  Started patient on IV diuresis, now euvolemic.  ENT was consulted yesterday due to hoarseness of voice.  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 Pt uninterested in any procedure at this time, ENT recommended SLP follow up.  Able to tolerate regular diet at this point.  Now agreeable for SNF, working on placement. Course c/b worsening hypoxia & hyperNa.       Overnight 4/18-4/19, decompensated with recurrent sepsis,  "hypothermia 92, possible PNA with infiltrates on CXR, Zosyn IV started. Remained hypothermic despite Zosyn x 2 doses and Jeffrey hugger in place (pt uncomfortable/hot and asking for removal), so added Vanc IV STAT. Checked TSH and FT4-  wnl. UA with pyuria however not a clean catch w 33 sq epi, so repeated UA. C/o "stomach" pain, overall picture with the "stomach" pain and septic picture is "exactly what led us to ICU 2 weeks ago" per daughter. +TTP RUQ, RLQ, LLQ.   Poor UOP, only 200cc by mid-shift, and still hyperNa with FWD 1.8L so started gentle hydration with D5W @ 75cc for 24 hrs. May need to diurese again after sepsis resolves. CT C/A/P noncon with pulm edema, B pleural effusions, possible PNA, no intra-abdominal process seen.      On 4/21, Mental status much improved, however still with mild confusion and feels very tired.   decompensated further with recurrent MARQUEZ, C/o new SOB and hypoxemic 90% on 4L (over her baseline of 2L at home), improved to >94% on 6L with improvement of SOB.  C/o new L chest pressure/pain 4/10 (non-radiating), c/f MI or PE, resolved after nitro SL x 1 however HypoT with SBP upper 80s so will avoid further nitro.  HS trop 38 (decreased from prior), BNP 700s (increased from prior).CXR with pulm edema, all most c/w ADHF- Lasix 40 IV x 1 given.    Concern for untreated sepsis with ongoing hypothermia requiring jeffrey hugger despite Vanc/Zosyn for over 24 hours, so broadened to Vanc/Sher/Dinah, consulted ID, repeated BCx x 2. Lactate wnl.  Re: concern for PE, Known LIJ DVT, apixaban x several days then stopped (likely for hemorrhage of vocal fold)- discussed w ENT 4/20, ok to resume anticoag if needed. PE workup- F/u STAT UE and LE Dopplers. Unable to check CTA chest given MARQUEZ, unable to check V/Q given pulm edema. Anticoagulation with Apixaban.  Hypothermia returned however pt looks and feels the best she has in 4 days.  Voice is getting stronger.  Sitting in chair.  UCx resulted with ESBL " "Klebsiella, so Sher has been good coverage.  ID changed to Dapto (from Vanc), continuing Sher, for coverage of UTI and well as PNA.  BCx have all been negative.  Continuing Eliquis for DVt LUE and presumed PE.  Acute hypox resp failure 2/2 ADHF and presumed PE, still on 6L, dosing Lasix IV PRN daily given yet-unclear status of her sepsis and avoiding volume depletion in sepsis.      Prior to admit, was independent in her residence with her cat.  Two daughters very supportive.         Interval History: Pt seen and examined this morning on rounds. Hypothermia ongoing this morning, required Manny hugger.  Resolved in afternoon.  If hypothermia returns overnight, will repeat BCx x 2.  Attempting sputum sample for Cx as well, not optimistic for any yield. NM Full body tagged WBC scan per ID recs- will get injection tomorrow 4/24 then scan on 4/25.      Re-dosed Lasix at higher dose 80mg IV x 1, re-eval again tomorrow.            Care plan reviewed. Otherwise, doing well and with no further complaints at this time."        Hospital Course:  No notes on file    Interval History: resting in bed, somnolent with simple and confused reply but will open eyes to voice and gentle stimuli.     Medications:  Continuous Infusions:  Scheduled Meds:   apixaban  5 mg Oral BID    levothyroxine  50 mcg Oral Before breakfast    LIDOcaine  1 patch Transdermal Q24H    metoprolol tartrate  12.5 mg Oral BID     PRN Meds:  Current Facility-Administered Medications:     acetaminophen, 650 mg, Oral, Q6H PRN    lorazepam, 0.25 mg, Intravenous, Q30 Min PRN    morphine, 4 mg, Intravenous, Q4H PRN    nitroGLYCERIN, 0.4 mg, Sublingual, Q5 Min PRN    ondansetron, 8 mg, Oral, Q8H PRN    polyethylene glycol, 17 g, Oral, BID PRN    senna, 8.6 mg, Oral, Daily PRN    simethicone, 1 tablet, Oral, TID PRN    Objective:     Vital Signs (Most Recent):  Temp: 97 °F (36.1 °C) (05/03/25 2020)  Pulse: 69 (05/04/25 0733)  Resp: 14 (05/04/25 0733)  BP: 139/72 " "(05/04/25 0733)  SpO2: 96 % (05/04/25 0733) Vital Signs (24h Range):  Temp:  [97 °F (36.1 °C)] 97 °F (36.1 °C)  Pulse:  [64-69] 69  Resp:  [8-14] 14  SpO2:  [94 %-96 %] 96 %  BP: ()/(51-72) 139/72     Weight: 70.5 kg (155 lb 6.8 oz)  Body mass index is 26.68 kg/m².       Physical Exam       Review of Symptoms      Symptom Assessment (ESAS 0-10 Scale)  Pain:  0  Dyspnea:  0  Anxiety:  0  Nausea:  0  Depression:  0  Anorexia:  0  Fatigue:  0  Insomnia:  0  Restlessness:  0  Agitation:  0         Psychosocial/Cultural:   See Palliative Psychosocial Note: Yes  **Primary  to Follow**  Palliative Care  Consult: Yes        Advance Care Planning  Advance Care Planning       Significant Labs: None  CBC:   Recent Labs   Lab 04/29/25  0526   WBC 6.38   HGB 11.5*   HCT 37.3   MCV 92        BMP:  No results for input(s): "GLU", "NA", "K", "CL", "CO2", "BUN", "CREATININE", "CALCIUM", "MG" in the last 24 hours.  LFT:  Lab Results   Component Value Date    AST 47 (H) 04/29/2025    ALKPHOS 107 04/29/2025    BILITOT 0.5 04/29/2025     Albumin:   Albumin   Date Value Ref Range Status   04/29/2025 2.2 (L) 3.5 - 5.2 g/dL Final   02/18/2025 2.8 (L) 3.5 - 5.2 g/dL Final     Protein:   Protein Total   Date Value Ref Range Status   04/29/2025 5.4 (L) 6.0 - 8.4 gm/dL Final     Total Protein   Date Value Ref Range Status   02/18/2025 6.0 6.0 - 8.4 g/dL Final     Lactic acid:   Lab Results   Component Value Date    LACTATE 1.6 04/21/2025    LACTATE 0.8 04/20/2025       Significant Imaging: None    Chema Robles, DO  Palliative Medicine  [facility] - [facility]                "

## 2025-05-05 NOTE — PROGRESS NOTES
[facility]  Palliative Medicine  Progress Note    Patient Name: Misa Barajas  MRN: [id number]  Admission Date: 4/6/2025  Hospital Length of Stay: 29 days  Code Status: DNR   Attending Provider: No att. providers found  Consulting Provider: Steph Ramos MD  Primary Care Physician: Isela Langston MD  Principal Problem:Palliative care encounter    Patient information was obtained from patient, relative(s), and primary team.      Assessment/Plan:     Palliative Care  * Palliative care encounter  Impression:  Misa Barajas is a 79 y.o. female with PMHx of COPD on 2L home O2, HFpEF, AFL s/p RFA (6/17/24 w/ Dr. Church), CKD III, HTN, HLD, chronic venous insufficiency, orthostatic hypotension who was brought via EMS after per history she was found confused and bradycardiac with HR in the 20s. Patient was percutaneously paced. During hospitalization, was intubated then extubated with residual hoarseness of voice (ENT following), required intermittent pacing by TVP, a Micra PM by EP placed 04/10, aggressive diuresing, recurrent sepsis and hypothermia, patient has required several trials of antibiotics, anticipating WBC scan on 4/25 (ID following), and presumed PE (unable to verify with CTA secondary to MARQUEZ).      Of note, patient has had 7x ED visits in the last year. Patient has engaged in goals of care discussions with Dr. Velasquez of Palliative Medicine for goals of care and symptom management. Palliative Medicine was consulted by primary, Dr. Antunez, for goals of care and advanced care planning discussions.    Patient is currently resting in bed, AAOx4, daughters (Sirisha and Esther) at bedside. Patient and family are amenable to Pal Med. Pal Med APRN and LCSW at bedside for goals of care discussions.     Transferred to [facility] on 4/29 after transitioning to comfort focused care.    - Prior experience with serious illness: yes  -The patient has previously engaged in advance care planning  "or GO discussions  - Insight/Understanding of illness: yes      Advance Care Planning    Date: 04/29/2025  - Family meeting at bedside with Esther Grimm, Dr. Antunez, and HAFSA Hdz DNP.   - Philosophies of hospice reviewed.   - Patient and family amenable to pivoting to comfort-focus care and transferring to [facility].  - Case discussed with [facility] Attending, Dr. Mayo and case management team.   - Transfer orders placed. Sirisha and Esther notified via telephone.     Date: 04/28/2025  - Patient resting in bed, 4L O2, jeffrey hugger warming blanket in place, no acute distress upon my visit. Patient's daughters arrive at bedside shortly after.   - Daughters requested we meet outside of room so that we do not disturb patient's rest. I met with patient's daughter in 3rd floor [facility].  - We discussed, patient's recent hospital encounters, current hospitalization, and patient's progressive health decline.   - Daughters acknowledge patient's journey in/out of hospital for past year has been tiring and exhausting and are interested in exploring hospice options. Sirisha states she has been trying to juggle working and caring for her mom after her work day, but is limited in the care that she gives her mom because she has no past medical background. Esther states she has been fortunate to be able to work from home to be with her mom more often. Both daughters acknowledged the emotional strain caring for their mom has caused. Daughters state when something "goes wrong", they try to do as much as they can (limited to checking a blood pressure, O2 sats, and giving a dose of zofran). Once they have exhausted those interventions, they call 911 because they know they are "in over their heads" to get additional help. Patient is then admitted to hospital for additional workup. Sirisha states that it has been so traumatizing when she sees her mom is sick, tries to do everything she can to care for her mom, notices it is not working, and " "hears her mom begging for help, but refuses to go to the hospital. This has created immense stress on the daughters. Patient never wants to go to the hospital, but eventually agrees to come to ED because either daughters convince her or because patient becomes too sick to independently make a decision. Sirisha states she is worried that she is "giving up" on her mom even though patient has been "fighting" for so long. Philosophies of hospice introduced and discussed. Esther states she has been caring for and thinking about how her mom has progressively declined over the last year and expresses desires to prioritize patient's comfort knowing that with comfort being the priority, life will be limited. Daughters acknowledged patient wants to go home, but state they do not have the resources to continue to care for the patient at home. Daughters inquired about inpatient hospice unit, discussed in-patient hospice criteria. During conversation, daughters became appropriately tearful. Emotional support provided. Daughters asked for additional time to process steps forward and where the patient would continued to be cared for.   - After speaking with the daughters, I rounded on the patient again. Patient was awake and amenable to Pal Med. Patient acknowledges her health decline and states it has been taxing and tiring as she has had multiple hospitalization, including the current encounter. Patient agrees that her comfort has not been prioritized over the last year and if open to talking more about hospice care. Philosophies of hospice were introduced and discussed. Patient is accepting, and states she would like to go home. I informed patient that daughters expressed concerns being able to care for patient and would like to talk more about facility placement/in-patient hospice unit. Patient expressed that she was overwhelmed with the conversation and describes it as "a lot." Patient acknowledges she was not expecting to be " having this [hospice] conversation so soon, but understands that we need to have it because she has not had much quality of life lately. Patient states she would like additional time to talk with her daughters.   - Tentative family meeting 4/29/30 at 0900. Primary team, Dr. Antunez, updated via secure chat.    Date: 04/25/2025  - No acute events overnight. Patient anticipates NM Inflammatory Whole Body scan today.  - Revisited goals of care discussion from yesterday. Patient acknowledged it was a difficult conversation to have, but has been open to it. As we anticipate more information from the pending studies, goals of care discussions are also pending. Patient expressed concerns regarding nuclear scan and when it will be getting done. Patient states she thought it would have been done by now. I followed up with radiology to inquire about scan time, and updated patient on the 1-3pm time frame that radiology hopes to see patient. Patient's daughter Esther was at bedside and updated.   - Patient with no needs at this time.  - Primary team updated.                  05/04/2025    [facility]  I engaged the patient and family in a voluntary conversation about advance care planning and we specifically addressed what the goals of care would be moving forward, in light of the patient's change in clinical status, specifically recurrent hospitalizations over past year, hypothermia, acute renal failure, CHF exacerbation, and current hosptialization.  We did specifically address the patient's likely prognosis, which appears to be poor.  We explored the patient's values and preferences for future care.  The patient and family endorses that what is most important right now is to focus on spending time at home, avoiding the hospital, and quality of life, even if it means sacrificing a little time    Accordingly, we have decided that the best plan to meet the patient's goals includes pivot to comfort-focused care           Interval: Patient  "becoming less responsive. No PO intake. Discussed removing supplemental O2 with family to not prolong dying process     Life Limiting Diagnosis  Acute on Chronic CHF  Acute Renal Failure on CKD stage 3b  Complete Heart Block    Symptom Management  - Pain: Morphine 4mg IV q1 hr prn     - Dyspnea: no  - on 2L at home  - maintained on 4L O2. Plan to wean supplemental O2 5/7.   -Morphine prn    Anxiety/Agitation  -Lorazepam 0.5mg IV q1h prn     Need for inpatient care- Active dying process as evidenced by loss of consciousness, cool extremities, and irregular breathing pattern. Requiring IV opioids and benzodiazepines for pain behaviors, respiratory distress, and agitation. Likely prognosis of hours to short days     Dispo: anticipate EOL care on the unit, if patient unexpectedly stabilizes, will approach family regarding care at home or alternative in-facility hospice care                        Subjective:     Chief Complaint:   Chief Complaint   Patient presents with    Bradycardia     Arrives via EMS hypotensive 80s/50s and bradycadic in the 20s.       HPI:   As per H&P, "Misa Barajas is a 79 y.o. female COPD on 2L home O2, HFpEF, AFL s/p RFA (6/17/24 w/ Dr. Church), CKD III, HTN, HLD, chronic venous insufficiency, orthostatic hypotension who was brought via EMS after per history she was found confused and bradycardiac with HR in the 20s.They started epi and transcutaneous pacing. Given Versed for the transcutaneous pacing. They had to bag her in route. We do not have strip of underlying rhythm. Patient obtunded for which she was intubated for airway protection on arrival to the ED and transcutaneous pacing was exchanged to transvenous pacing via RIJ (threshold 0.6). Underlying rate 44 bifascicular block. Known RBBB. Per daughters patient had called them over the phone when they noticed she appeared confused. Due to concerns for a possible syncopal episode EMS was called. Prior to event patient had been " complaining of malaise and nausea for which she took about two Zofran pills per the daughters.      She had been recently seen on 3/27/25 at the [facility] visit following recent admission for AHRF 2/2 ADHF and COPD exacerbation. On ED presentation BNP elevated to 2500, troponin elevated to 19, CXR with pulmonary edema. She was started on steroids, nebs and antibiotics in addition to IV diuresis. Updated TTE stable from prior, showing HFpEF with elevated CVP 15. Her O2 requirements improved to baseline with diuresis and she was subsequently discharged. At that visit patient had not required midodrine as blood pressure had remained stable and she was asymptomatic.      Off note, patient was taken off OAC due to bleeding issues and no recurrent of typical atrial flutter after ablation.      Overview/Hospital Course:  Admitted to CCU for CHB in s/o hyperkalemia & in the presence of conduction disease/RBBB & LAHB - stable w/ temporary wire. ??Septic shock deemed to be d/t UTI - ?? UA with only 7 WBCs); vasopressors weaned off. HTN; started NG gtt. Acute on chronic hypoxic/hypercapnic RF & obtunded; intubated. Successfully extubated 04/08. MARQUEZ on CKD; improved w/ good UOP response to diuresis. PVCs w/ intermittent pacing by TVP. Switched pip-tazo to amox-clav on 04/09; continued to improve; empiric EED 04/13 for 7 day course. Micra PM by EP placed 04/10. Medically stable for stepdown. Patient was extremely short of breath with physical therapy, chest x-ray concerning for pulmonary edema.  Started patient on IV diuresis, now euvolemic.  ENT was consulted yesterday due to hoarseness of voice.  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 Pt uninterested in any procedure at this time, ENT recommended SLP follow up.  Able to tolerate regular diet at this point.  Now agreeable for SNF, working on placement. Course c/b worsening hypoxia & hyperNa.       Overnight 4/18-4/19, decompensated  "with recurrent sepsis, hypothermia 92, possible PNA with infiltrates on CXR, Zosyn IV started. Remained hypothermic despite Zosyn x 2 doses and Jeffrey hugger in place (pt uncomfortable/hot and asking for removal), so added Vanc IV STAT. Checked TSH and FT4-  wnl. UA with pyuria however not a clean catch w 33 sq epi, so repeated UA. C/o "stomach" pain, overall picture with the "stomach" pain and septic picture is "exactly what led us to ICU 2 weeks ago" per daughter. +TTP RUQ, RLQ, LLQ.   Poor UOP, only 200cc by mid-shift, and still hyperNa with FWD 1.8L so started gentle hydration with D5W @ 75cc for 24 hrs. May need to diurese again after sepsis resolves. CT C/A/P noncon with pulm edema, B pleural effusions, possible PNA, no intra-abdominal process seen.      On 4/21, Mental status much improved, however still with mild confusion and feels very tired.   decompensated further with recurrent MARQUEZ, C/o new SOB and hypoxemic 90% on 4L (over her baseline of 2L at home), improved to >94% on 6L with improvement of SOB.  C/o new L chest pressure/pain 4/10 (non-radiating), c/f MI or PE, resolved after nitro SL x 1 however HypoT with SBP upper 80s so will avoid further nitro.  HS trop 38 (decreased from prior), BNP 700s (increased from prior).CXR with pulm edema, all most c/w ADHF- Lasix 40 IV x 1 given.    Concern for untreated sepsis with ongoing hypothermia requiring jeffrey hugger despite Vanc/Zosyn for over 24 hours, so broadened to Vanc/Sher/Dinah, consulted ID, repeated BCx x 2. Lactate wnl.  Re: concern for PE, Known LIJ DVT, apixaban x several days then stopped (likely for hemorrhage of vocal fold)- discussed w ENT 4/20, ok to resume anticoag if needed. PE workup- F/u STAT UE and LE Dopplers. Unable to check CTA chest given MARQUEZ, unable to check V/Q given pulm edema. Anticoagulation with Apixaban.  Hypothermia returned however pt looks and feels the best she has in 4 days.  Voice is getting stronger.  Sitting in chair.  UCx " "resulted with ESBL Klebsiella, so Sher has been good coverage.  ID changed to Dapto (from Vanc), continuing Sher, for coverage of UTI and well as PNA.  BCx have all been negative.  Continuing Eliquis for DVt LUE and presumed PE.  Acute hypox resp failure 2/2 ADHF and presumed PE, still on 6L, dosing Lasix IV PRN daily given yet-unclear status of her sepsis and avoiding volume depletion in sepsis.      Prior to admit, was independent in her residence with her cat.  Two daughters very supportive.         Interval History: Pt seen and examined this morning on rounds. Hypothermia ongoing this morning, required Manny hugger.  Resolved in afternoon.  If hypothermia returns overnight, will repeat BCx x 2.  Attempting sputum sample for Cx as well, not optimistic for any yield. NM Full body tagged WBC scan per ID recs- will get injection tomorrow 4/24 then scan on 4/25.      Re-dosed Lasix at higher dose 80mg IV x 1, re-eval again tomorrow.            Care plan reviewed. Otherwise, doing well and with no further complaints at this time."        Hospital Course:  No notes on file    Interval History: minimally responsive. No longer taking in any PO. Family at bedside     PRNs in past 24 hours   Ativan 0.25mg IV x 1, morphine 4mg IV x 1    Medications:  Continuous Infusions:  Scheduled Meds:   apixaban  5 mg Oral BID    levothyroxine  50 mcg Oral Before breakfast    LIDOcaine  1 patch Transdermal Q24H    metoprolol tartrate  12.5 mg Oral BID     PRN Meds:  Current Facility-Administered Medications:     acetaminophen, 650 mg, Oral, Q6H PRN    lorazepam, 0.25 mg, Intravenous, Q30 Min PRN    morphine, 4 mg, Intravenous, Q4H PRN    nitroGLYCERIN, 0.4 mg, Sublingual, Q5 Min PRN    ondansetron, 8 mg, Oral, Q8H PRN    polyethylene glycol, 17 g, Oral, BID PRN    senna, 8.6 mg, Oral, Daily PRN    simethicone, 1 tablet, Oral, TID PRN    Objective:     Vital Signs (Most Recent):  Temp: 98.1 °F (36.7 °C) (05/05/25 0818)  Pulse: 69 " "(05/05/25 0818)  Resp: 20 (05/05/25 0818)  BP: 135/65 (05/05/25 0818)  SpO2: 97 % (05/05/25 0818) Vital Signs (24h Range):  Temp:  [97 °F (36.1 °C)-98.1 °F (36.7 °C)] 98.1 °F (36.7 °C)  Pulse:  [61-69] 69  Resp:  [7-20] 20  SpO2:  [95 %-97 %] 97 %  BP: (135)/(65-67) 135/65     Weight: 70.5 kg (155 lb 6.8 oz)  Body mass index is 26.68 kg/m².       Physical Exam  Vitals and nursing note reviewed.   Constitutional:       Comments: Minimally responsive    Cardiovascular:      Rate and Rhythm: Normal rate.   Pulmonary:      Effort: No respiratory distress.   Skin:     General: Skin is warm.            Review of Symptoms      Symptom Assessment (ESAS 0-10 Scale)  Pain:  0  Dyspnea:  0  Anxiety:  0  Nausea:  0  Depression:  0  Anorexia:  0  Fatigue:  0  Insomnia:  0  Restlessness:  0  Agitation:  0         Psychosocial/Cultural:   See Palliative Psychosocial Note: Yes  **Primary  to Follow**  Palliative Care  Consult: Yes        Advance Care Planning  Advance Directives:   Living Will: Yes        Copy on chart: Yes    Do Not Resuscitate Status: Yes    Medical Power of : Yes    Goals of Care: The patient and family endorses that what is most important right now is to focus on comfort and QOL     Accordingly, we have decided that the best plan to meet the patient's goals includes pivot to comfort-focused care           Significant Labs: None  CBC:   Recent Labs   Lab 04/29/25  0526   WBC 6.38   HGB 11.5*   HCT 37.3   MCV 92        BMP:  No results for input(s): "GLU", "NA", "K", "CL", "CO2", "BUN", "CREATININE", "CALCIUM", "MG" in the last 24 hours.  LFT:  Lab Results   Component Value Date    AST 47 (H) 04/29/2025    ALKPHOS 107 04/29/2025    BILITOT 0.5 04/29/2025     Albumin:   Albumin   Date Value Ref Range Status   04/29/2025 2.2 (L) 3.5 - 5.2 g/dL Final   02/18/2025 2.8 (L) 3.5 - 5.2 g/dL Final     Protein:   Protein Total   Date Value Ref Range Status   04/29/2025 5.4 (L) 6.0 - " 8.4 gm/dL Final     Total Protein   Date Value Ref Range Status   02/18/2025 6.0 6.0 - 8.4 g/dL Final     Lactic acid:   Lab Results   Component Value Date    LACTATE 1.6 04/21/2025    LACTATE 0.8 04/20/2025       Significant Imaging: None    Steph Ramos MD  Palliative Medicine  [facility] - Hospice and Palliative Medicine

## 2025-05-05 NOTE — SUBJECTIVE & OBJECTIVE
Interval History: minimally responsive. No longer taking in any PO. Family at bedside     PRNs in past 24 hours   Ativan 0.25mg IV x 1, morphine 4mg IV x 1    Medications:  Continuous Infusions:  Scheduled Meds:   apixaban  5 mg Oral BID    levothyroxine  50 mcg Oral Before breakfast    LIDOcaine  1 patch Transdermal Q24H    metoprolol tartrate  12.5 mg Oral BID     PRN Meds:  Current Facility-Administered Medications:     acetaminophen, 650 mg, Oral, Q6H PRN    lorazepam, 0.25 mg, Intravenous, Q30 Min PRN    morphine, 4 mg, Intravenous, Q4H PRN    nitroGLYCERIN, 0.4 mg, Sublingual, Q5 Min PRN    ondansetron, 8 mg, Oral, Q8H PRN    polyethylene glycol, 17 g, Oral, BID PRN    senna, 8.6 mg, Oral, Daily PRN    simethicone, 1 tablet, Oral, TID PRN    Objective:     Vital Signs (Most Recent):  Temp: 98.1 °F (36.7 °C) (05/05/25 0818)  Pulse: 69 (05/05/25 0818)  Resp: 20 (05/05/25 0818)  BP: 135/65 (05/05/25 0818)  SpO2: 97 % (05/05/25 0818) Vital Signs (24h Range):  Temp:  [97 °F (36.1 °C)-98.1 °F (36.7 °C)] 98.1 °F (36.7 °C)  Pulse:  [61-69] 69  Resp:  [7-20] 20  SpO2:  [95 %-97 %] 97 %  BP: (135)/(65-67) 135/65     Weight: 70.5 kg (155 lb 6.8 oz)  Body mass index is 26.68 kg/m².       Physical Exam  Vitals and nursing note reviewed.   Constitutional:       Comments: Minimally responsive    Cardiovascular:      Rate and Rhythm: Normal rate.   Pulmonary:      Effort: No respiratory distress.   Skin:     General: Skin is warm.            Review of Symptoms      Symptom Assessment (ESAS 0-10 Scale)  Pain:  0  Dyspnea:  0  Anxiety:  0  Nausea:  0  Depression:  0  Anorexia:  0  Fatigue:  0  Insomnia:  0  Restlessness:  0  Agitation:  0         Psychosocial/Cultural:   See Palliative Psychosocial Note: Yes  **Primary  to Follow**  Palliative Care  Consult: Yes        Advance Care Planning   Advance Directives:   Living Will: Yes        Copy on chart: Yes    Do Not Resuscitate Status: Yes    Medical  "Power of : Yes    Goals of Care: The patient and family endorses that what is most important right now is to focus on comfort and QOL     Accordingly, we have decided that the best plan to meet the patient's goals includes pivot to comfort-focused care           Significant Labs: None  CBC:   Recent Labs   Lab 04/29/25  0526   WBC 6.38   HGB 11.5*   HCT 37.3   MCV 92        BMP:  No results for input(s): "GLU", "NA", "K", "CL", "CO2", "BUN", "CREATININE", "CALCIUM", "MG" in the last 24 hours.  LFT:  Lab Results   Component Value Date    AST 47 (H) 04/29/2025    ALKPHOS 107 04/29/2025    BILITOT 0.5 04/29/2025     Albumin:   Albumin   Date Value Ref Range Status   04/29/2025 2.2 (L) 3.5 - 5.2 g/dL Final   02/18/2025 2.8 (L) 3.5 - 5.2 g/dL Final     Protein:   Protein Total   Date Value Ref Range Status   04/29/2025 5.4 (L) 6.0 - 8.4 gm/dL Final     Total Protein   Date Value Ref Range Status   02/18/2025 6.0 6.0 - 8.4 g/dL Final     Lactic acid:   Lab Results   Component Value Date    LACTATE 1.6 04/21/2025    LACTATE 0.8 04/20/2025       Significant Imaging: None  "

## 2025-05-05 NOTE — ASSESSMENT & PLAN NOTE
Impression:  Misa Barajas is a 79 y.o. female with PMHx of COPD on 2L home O2, HFpEF, AFL s/p RFA (6/17/24 w/ Dr. Church), CKD III, HTN, HLD, chronic venous insufficiency, orthostatic hypotension who was brought via EMS after per history she was found confused and bradycardiac with HR in the 20s. Patient was percutaneously paced. During hospitalization, was intubated then extubated with residual hoarseness of voice (ENT following), required intermittent pacing by TVP, a Micra PM by EP placed 04/10, aggressive diuresing, recurrent sepsis and hypothermia, patient has required several trials of antibiotics, anticipating WBC scan on 4/25 (ID following), and presumed PE (unable to verify with CTA secondary to MARQUEZ).      Of note, patient has had 7x ED visits in the last year. Patient has engaged in goals of care discussions with Dr. Velasquez of Palliative Medicine for goals of care and symptom management. Palliative Medicine was consulted by primary, Dr. Antunez, for goals of care and advanced care planning discussions.    Patient is currently resting in bed, AAOx4, daughters (Sirisha and Esther) at bedside. Patient and family are amenable to [facility]. [facility] APRN and LCSW at bedside for goals of care discussions.     Transferred to [facility] on 4/29 after transitioning to comfort focused care.    - Prior experience with serious illness: yes  -The patient has previously engaged in advance care planning or GOC discussions  - Insight/Understanding of illness: yes      Advance Care Planning     Date: 04/29/2025  - Family meeting at bedside with Esther Grimm, Dr. Antunez, and HAFSA Hdz DNP.   - Philosophies of hospice reviewed.   - Patient and family amenable to pivoting to comfort-focus care and transferring to [facility].  - Case discussed with [facility] Attending, Dr. Mayo and case management team.   - Transfer orders placed. Elyssa notified via telephone.     Date: 04/28/2025  - Patient resting in bed, 4L O2, jeffrey hugger warming  "blanket in place, no acute distress upon my visit. Patient's daughters arrive at bedside shortly after.   - Daughters requested we meet outside of room so that we do not disturb patient's rest. I met with patient's daughter in 3rd floor Vis[facility]'s [facility].  - We discussed, patient's recent hospital encounters, current hospitalization, and patient's progressive health decline.   - Daughters acknowledge patient's journey in/out of hospital for past year has been tiring and exhausting and are interested in exploring hospice options. Sirisha states she has been trying to juggle working and caring for her mom after her work day, but is limited in the care that she gives her mom because she has no past medical background. Esther states she has been fortunate to be able to work from home to be with her mom more often. Both daughters acknowledged the emotional strain caring for their mom has caused. Daughters state when something "goes wrong", they try to do as much as they can (limited to checking a blood pressure, O2 sats, and giving a dose of zofran). Once they have exhausted those interventions, they call 911 because they know they are "in over their heads" to get additional help. Patient is then admitted to hospital for additional workup. Sirisha states that it has been so traumatizing when she sees her mom is sick, tries to do everything she can to care for her mom, notices it is not working, and hears her mom begging for help, but refuses to go to the hospital. This has created immense stress on the daughters. Patient never wants to go to the hospital, but eventually agrees to come to ED because either daughters convince her or because patient becomes too sick to independently make a decision. Sirisha states she is worried that she is "giving up" on her mom even though patient has been "fighting" for so long. Philosophies of hospice introduced and discussed. Esther states she has been caring for and thinking about how her " "mom has progressively declined over the last year and expresses desires to prioritize patient's comfort knowing that with comfort being the priority, life will be limited. Daughters acknowledged patient wants to go home, but state they do not have the resources to continue to care for the patient at home. Daughters inquired about inpatient hospice unit, discussed in-patient hospice criteria. During conversation, daughters became appropriately tearful. Emotional support provided. Daughters asked for additional time to process steps forward and where the patient would continued to be cared for.   - After speaking with the daughters, I rounded on the patient again. Patient was awake and amenable to [facility]. Patient acknowledges her health decline and states it has been taxing and tiring as she has had multiple hospitalization, including the current encounter. Patient agrees that her comfort has not been prioritized over the last year and if open to talking more about hospice care. Philosophies of hospice were introduced and discussed. Patient is accepting, and states she would like to go home. I informed patient that daughters expressed concerns being able to care for patient and would like to talk more about facility placement/in-patient hospice unit. Patient expressed that she was overwhelmed with the conversation and describes it as "a lot." Patient acknowledges she was not expecting to be having this [hospice] conversation so soon, but understands that we need to have it because she has not had much quality of life lately. Patient states she would like additional time to talk with her daughters.   - Tentative family meeting 4/29/30 at 0900. Primary team, Dr. Antunez, updated via secure chat.    Date: 04/25/2025  - No acute events overnight. Patient anticipates NM Inflammatory Whole Body scan today.  - Revisited goals of care discussion from yesterday. Patient acknowledged it was a difficult conversation to have, but " has been open to it. As we anticipate more information from the pending studies, goals of care discussions are also pending. Patient expressed concerns regarding nuclear scan and when it will be getting done. Patient states she thought it would have been done by now. I followed up with radiology to inquire about scan time, and updated patient on the 1-3pm time frame that radiology hopes to see patient. Patient's daughter Esther was at bedside and updated.   - Patient with no needs at this time.  - Primary team updated.                  05/04/2025    [facility]  I engaged the patient and family in a voluntary conversation about advance care planning and we specifically addressed what the goals of care would be moving forward, in light of the patient's change in clinical status, specifically recurrent hospitalizations over past year, hypothermia, acute renal failure, CHF exacerbation, and current hosptialization.  We did specifically address the patient's likely prognosis, which appears to be poor.  We explored the patient's values and preferences for future care.  The patient and family endorses that what is most important right now is to focus on spending time at home, avoiding the hospital, and quality of life, even if it means sacrificing a little time    Accordingly, we have decided that the best plan to meet the patient's goals includes pivot to comfort-focused care           Interval: Patient becoming less responsive. No PO intake. Discussed removing supplemental O2 with family to not prolong dying process     Life Limiting Diagnosis  Acute on Chronic CHF  Acute Renal Failure on CKD stage 3b  Complete Heart Block    Symptom Management  - Pain: Morphine 4mg IV q1 hr prn     - Dyspnea: no  - on 2L at home  - maintained on 4L O2. Plan to wean supplemental O2 5/7.   -Morphine prn    Anxiety/Agitation  -Lorazepam 0.5mg IV q1h prn     Need for inpatient care- Active dying process as evidenced by loss of consciousness, cool  extremities, and irregular breathing pattern. Requiring IV opioids and benzodiazepines for pain behaviors, respiratory distress, and agitation. Likely prognosis of hours to short days     Dispo: anticipate EOL care on the unit, if patient unexpectedly stabilizes, will approach family regarding care at home or alternative in-facility hospice care

## 2025-05-05 NOTE — PLAN OF CARE
Problem: Skin Injury Risk Increased  Goal: Skin Health and Integrity  Outcome: Progressing     Problem: Coping Ineffective  Goal: Effective Coping  Outcome: Progressing     Problem: Palliative Care  Goal: Enhanced Quality of Life  Outcome: Progressing

## 2025-05-06 NOTE — PROGRESS NOTES
[facility]  Palliative Medicine  Progress Note    Patient Name: Misa Barajas  MRN: [id number]  Admission Date: 4/6/2025  Hospital Length of Stay: 30 days  Code Status: DNR   Attending Provider: Steph Ramos MD  Consulting Provider: Steph Ramos MD  Primary Care Physician: Isela Langston MD  Principal Problem:Palliative care encounter    Patient information was obtained from patient, relative(s), and primary team.      Assessment/Plan:     Palliative Care  * Palliative care encounter  Impression:  Misa Barajas is a 79 y.o. female with PMHx of COPD on 2L home O2, HFpEF, AFL s/p RFA (6/17/24 w/ Dr. Church), CKD III, HTN, HLD, chronic venous insufficiency, orthostatic hypotension who was brought via EMS after per history she was found confused and bradycardiac with HR in the 20s. Patient was percutaneously paced. During hospitalization, was intubated then extubated with residual hoarseness of voice (ENT following), required intermittent pacing by TVP, a Micra PM by EP placed 04/10, aggressive diuresing, recurrent sepsis and hypothermia, patient has required several trials of antibiotics, anticipating WBC scan on 4/25 (ID following), and presumed PE (unable to verify with CTA secondary to MARQUEZ).      Of note, patient has had 7x ED visits in the last year. Patient has engaged in goals of care discussions with Dr. Velasquez of Palliative Medicine for goals of care and symptom management. Palliative Medicine was consulted by primary, Dr. Antunez, for goals of care and advanced care planning discussions.    Patient is currently resting in bed, AAOx4, daughters (Sirisha and Esther) at bedside. Patient and family are amenable to Pal Med. Pal Med APRN and LCSW at bedside for goals of care discussions.     Transferred to [facility] on 4/29 after transitioning to comfort focused care.    - Prior experience with serious illness: yes  -The patient has previously engaged in advance care planning or  "[place] [place] [facility] discussions  - Insight/Understanding of illness: yes      Advance Care Planning    Date: 04/29/2025  - Family meeting at bedside with Esther Grimm, Dr. Antunez, and HAFSA Hdz DNP.   - Philosophies of hospice reviewed.   - Patient and family amenable to pivoting to comfort-focus care and transferring to [facility].  - Case discussed with [facility] Attending, Dr. Mayo and case management team.   - Transfer orders placed. Sirisha and Esther notified via telephone.     Date: 04/28/2025  - Patient resting in bed, 4L O2, jeffrey hugger warming blanket in place, no acute distress upon my visit. Patient's daughters arrive at bedside shortly after.   - Daughters requested we meet outside of room so that we do not disturb patient's rest. I met with patient's daughter in 3rd floor Vis[facility].  - We discussed, patient's recent hospital encounters, current hospitalization, and patient's progressive health decline.   - Daughters acknowledge patient's journey in/out of hospital for past year has been tiring and exhausting and are interested in exploring hospice options. Sirisha states she has been trying to juggle working and caring for her mom after her work day, but is limited in the care that she gives her mom because she has no past medical background. Esther states she has been fortunate to be able to work from home to be with her mom more often. Both daughters acknowledged the emotional strain caring for their mom has caused. Daughters state when something "goes wrong", they try to do as much as they can (limited to checking a blood pressure, O2 sats, and giving a dose of zofran). Once they have exhausted those interventions, they call 911 because they know they are "in over their heads" to get additional help. Patient is then admitted to hospital for additional workup. Sirisha states that it has been so traumatizing when she sees her mom is sick, tries to do everything she can to care for her mom, notices it is not working, and hears " "her mom begging for help, but refuses to go to the hospital. This has created immense stress on the daughters. Patient never wants to go to the hospital, but eventually agrees to come to ED because either daughters convince her or because patient becomes too sick to independently make a decision. Sirisha states she is worried that she is "giving up" on her mom even though patient has been "fighting" for so long. Philosophies of hospice introduced and discussed. Esther states she has been caring for and thinking about how her mom has progressively declined over the last year and expresses desires to prioritize patient's comfort knowing that with comfort being the priority, life will be limited. Daughters acknowledged patient wants to go home, but state they do not have the resources to continue to care for the patient at home. Daughters inquired about inpatient hospice unit, discussed in-patient hospice criteria. During conversation, daughters became appropriately tearful. Emotional support provided. Daughters asked for additional time to process steps forward and where the patient would continued to be cared for.   - After speaking with the daughters, I rounded on the patient again. Patient was awake and amenable to Pal Med. Patient acknowledges her health decline and states it has been taxing and tiring as she has had multiple hospitalization, including the current encounter. Patient agrees that her comfort has not been prioritized over the last year and if open to talking more about hospice care. Philosophies of hospice were introduced and discussed. Patient is accepting, and states she would like to go home. I informed patient that daughters expressed concerns being able to care for patient and would like to talk more about facility placement/in-patient hospice unit. Patient expressed that she was overwhelmed with the conversation and describes it as "a lot." Patient acknowledges she was not expecting to be having " this [hospice] conversation so soon, but understands that we need to have it because she has not had much quality of life lately. Patient states she would like additional time to talk with her daughters.   - Tentative family meeting 4/29/30 at 0900. Primary team, Dr. Antunez, updated via secure chat.    Date: 04/25/2025  - No acute events overnight. Patient anticipates NM Inflammatory Whole Body scan today.  - Revisited goals of care discussion from yesterday. Patient acknowledged it was a difficult conversation to have, but has been open to it. As we anticipate more information from the pending studies, goals of care discussions are also pending. Patient expressed concerns regarding nuclear scan and when it will be getting done. Patient states she thought it would have been done by now. I followed up with radiology to inquire about scan time, and updated patient on the 1-3pm time frame that radiology hopes to see patient. Patient's daughter Esther was at bedside and updated.   - Patient with no needs at this time.  - Primary team updated.                  05/04/2025    [facility]  I engaged the patient and family in a voluntary conversation about advance care planning and we specifically addressed what the goals of care would be moving forward, in light of the patient's change in clinical status, specifically recurrent hospitalizations over past year, hypothermia, acute renal failure, CHF exacerbation, and current hosptialization.  We did specifically address the patient's likely prognosis, which appears to be poor.  We explored the patient's values and preferences for future care.  The patient and family endorses that what is most important right now is to focus on spending time at home, avoiding the hospital, and quality of life, even if it means sacrificing a little time    Accordingly, we have decided that the best plan to meet the patient's goals includes pivot to comfort-focused care          Interval: Patient becoming  "less responsive. No PO intake. Discussed removing supplemental O2 with family to not prolong dying process     Life Limiting Diagnosis  Acute on Chronic CHF  Acute Renal Failure on CKD stage 3b  Complete Heart Block    Symptom Management  - Pain: Morphine 4mg IV q1 hr prn     - Dyspnea: no  - on 2L at home  - maintained on 4L O2. Plan to wean supplemental O2 5/7.   -Morphine prn    Anxiety/Agitation  -Lorazepam 0.5mg IV q1h prn     Need for inpatient care- Active dying process as evidenced by loss of consciousness, cool extremities, and irregular breathing pattern. Requiring IV opioids and benzodiazepines for pain behaviors, respiratory distress, and agitation. Likely prognosis of hours to short days     Dispo: anticipate EOL care on the unit, if patient unexpectedly stabilizes, will approach family regarding care at home or alternative in-facility hospice care                          Subjective:     Chief Complaint:   Chief Complaint   Patient presents with    Bradycardia     Arrives via EMS hypotensive 80s/50s and bradycadic in the 20s.       HPI:   As per H&P, "Misa Barajas is a 79 y.o. female COPD on 2L home O2, HFpEF, AFL s/p RFA (6/17/24 w/ Dr. Church), CKD III, HTN, HLD, chronic venous insufficiency, orthostatic hypotension who was brought via EMS after per history she was found confused and bradycardiac with HR in the 20s.They started epi and transcutaneous pacing. Given Versed for the transcutaneous pacing. They had to bag her in route. We do not have strip of underlying rhythm. Patient obtunded for which she was intubated for airway protection on arrival to the ED and transcutaneous pacing was exchanged to transvenous pacing via RIJ (threshold 0.6). Underlying rate 44 bifascicular block. Known RBBB. Per daughters patient had called them over the phone when they noticed she appeared confused. Due to concerns for a possible syncopal episode EMS was called. Prior to event patient had been complaining of " malaise and nausea for which she took about two Zofran pills per the daughters.      She had been recently seen on 3/27/25 at the [facility] visit following recent admission for AHRF 2/2 ADHF and COPD exacerbation. On ED presentation BNP elevated to 2500, troponin elevated to 19, CXR with pulmonary edema. She was started on steroids, nebs and antibiotics in addition to IV diuresis. Updated TTE stable from prior, showing HFpEF with elevated CVP 15. Her O2 requirements improved to baseline with diuresis and she was subsequently discharged. At that visit patient had not required midodrine as blood pressure had remained stable and she was asymptomatic.      Off note, patient was taken off OAC due to bleeding issues and no recurrent of typical atrial flutter after ablation.      Overview/Hospital Course:  Admitted to CCU for CHB in s/o hyperkalemia & in the presence of conduction disease/RBBB & LAHB - stable w/ temporary wire. ??Septic shock deemed to be d/t UTI - ?? UA with only 7 WBCs); vasopressors weaned off. HTN; started NG gtt. Acute on chronic hypoxic/hypercapnic RF & obtunded; intubated. Successfully extubated 04/08. MARQUEZ on CKD; improved w/ good UOP response to diuresis. PVCs w/ intermittent pacing by TVP. Switched pip-tazo to amox-clav on 04/09; continued to improve; empiric EED 04/13 for 7 day course. Micra PM by EP placed 04/10. Medically stable for stepdown. Patient was extremely short of breath with physical therapy, chest x-ray concerning for pulmonary edema.  Started patient on IV diuresis, now euvolemic.  ENT was consulted yesterday due to hoarseness of voice.  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 Pt uninterested in any procedure at this time, ENT recommended SLP follow up.  Able to tolerate regular diet at this point.  Now agreeable for SNF, working on placement. Course c/b worsening hypoxia & hyperNa.       Overnight 4/18-4/19, decompensated with recurrent  "sepsis, hypothermia 92, possible PNA with infiltrates on CXR, Zosyn IV started. Remained hypothermic despite Zosyn x 2 doses and Jeffrey hugger in place (pt uncomfortable/hot and asking for removal), so added Vanc IV STAT. Checked TSH and FT4-  wnl. UA with pyuria however not a clean catch w 33 sq epi, so repeated UA. C/o "stomach" pain, overall picture with the "stomach" pain and septic picture is "exactly what led us to ICU 2 weeks ago" per daughter. +TTP RUQ, RLQ, LLQ.   Poor UOP, only 200cc by mid-shift, and still hyperNa with FWD 1.8L so started gentle hydration with D5W @ 75cc for 24 hrs. May need to diurese again after sepsis resolves. CT C/A/P noncon with pulm edema, B pleural effusions, possible PNA, no intra-abdominal process seen.      On 4/21, Mental status much improved, however still with mild confusion and feels very tired.   decompensated further with recurrent MARQUEZ, C/o new SOB and hypoxemic 90% on 4L (over her baseline of 2L at home), improved to >94% on 6L with improvement of SOB.  C/o new L chest pressure/pain 4/10 (non-radiating), c/f MI or PE, resolved after nitro SL x 1 however HypoT with SBP upper 80s so will avoid further nitro.  HS trop 38 (decreased from prior), BNP 700s (increased from prior).CXR with pulm edema, all most c/w ADHF- Lasix 40 IV x 1 given.    Concern for untreated sepsis with ongoing hypothermia requiring jeffrey hugger despite Vanc/Zosyn for over 24 hours, so broadened to Vanc/Sher/Dinah, consulted ID, repeated BCx x 2. Lactate wnl.  Re: concern for PE, Known LIJ DVT, apixaban x several days then stopped (likely for hemorrhage of vocal fold)- discussed w ENT 4/20, ok to resume anticoag if needed. PE workup- F/u STAT UE and LE Dopplers. Unable to check CTA chest given MARQUEZ, unable to check V/Q given pulm edema. Anticoagulation with Apixaban.  Hypothermia returned however pt looks and feels the best she has in 4 days.  Voice is getting stronger.  Sitting in chair.  UCx resulted with " "ESBL Klebsiella, so Sher has been good coverage.  ID changed to Dapto (from Vanc), continuing Sher, for coverage of UTI and well as PNA.  BCx have all been negative.  Continuing Eliquis for DVt LUE and presumed PE.  Acute hypox resp failure 2/2 ADHF and presumed PE, still on 6L, dosing Lasix IV PRN daily given yet-unclear status of her sepsis and avoiding volume depletion in sepsis.      Prior to admit, was independent in her residence with her cat.  Two daughters very supportive.         Interval History: Pt seen and examined this morning on rounds. Hypothermia ongoing this morning, required Manny hugger.  Resolved in afternoon.  If hypothermia returns overnight, will repeat BCx x 2.  Attempting sputum sample for Cx as well, not optimistic for any yield. NM Full body tagged WBC scan per ID recs- will get injection tomorrow 4/24 then scan on 4/25.      Re-dosed Lasix at higher dose 80mg IV x 1, re-eval again tomorrow.            Care plan reviewed. Otherwise, doing well and with no further complaints at this time."        Hospital Course:  No notes on file    Interval History: Patient resting comfortably. Minimal prns. No longer interacting. Family at bedside     PRNs in past 24 hours   Ativan 0.5mg IV x 0 , morphine 4mg IV x 2    Medications:  Continuous Infusions:  Scheduled Meds:      PRN Meds:  Current Facility-Administered Medications:     acetaminophen, 650 mg, Oral, Q6H PRN    glycopyrrolate, 0.4 mg, Intravenous, Q4H PRN    lorazepam, 0.5 mg, Intravenous, Q30 Min PRN    morphine, 4 mg, Intravenous, Q1H PRN    nitroGLYCERIN, 0.4 mg, Sublingual, Q5 Min PRN    ondansetron, 8 mg, Oral, Q8H PRN    polyethylene glycol, 17 g, Oral, BID PRN    senna, 8.6 mg, Oral, Daily PRN    simethicone, 1 tablet, Oral, TID PRN    Objective:     Vital Signs (Most Recent):  Temp: 97 °F (36.1 °C) (05/05/25 2000)  Pulse: 61 (05/06/25 0743)  Resp: 12 (05/06/25 0743)  BP: 134/63 (05/06/25 0743)  SpO2: 96 % (05/06/25 7949) Vital Signs " "(24h Range):  Temp:  [97 °F (36.1 °C)] 97 °F (36.1 °C)  Pulse:  [61-73] 61  Resp:  [10-16] 12  SpO2:  [94 %-96 %] 96 %  BP: (134-149)/(63-83) 134/63     Weight: 70.5 kg (155 lb 6.8 oz)  Body mass index is 26.68 kg/m².       Physical Exam  Vitals and nursing note reviewed.   Constitutional:       General: She is not in acute distress.  Cardiovascular:      Rate and Rhythm: Normal rate.   Pulmonary:      Effort: No respiratory distress.   Skin:     General: Skin is warm.   Neurological:      Comments: Unresponsive            Review of Symptoms      Symptom Assessment (ESAS 0-10 Scale)  Pain:  0  Dyspnea:  0  Anxiety:  0  Nausea:  0  Depression:  0  Anorexia:  0  Fatigue:  0  Insomnia:  0  Restlessness:  0  Agitation:  0         Psychosocial/Cultural:   See Palliative Psychosocial Note: Yes  **Primary  to Follow**  Palliative Care  Consult: Yes        Advance Care Planning  Advance Directives:   Living Will: Yes        Copy on chart: Yes    Do Not Resuscitate Status: Yes    Medical Power of : Yes    Goals of Care: The patient and family endorses that what is most important right now is to focus on comfort and QOL     Accordingly, we have decided that the best plan to meet the patient's goals includes pivot to comfort-focused care           Significant Labs: None  CBC:   No results for input(s): "WBC", "HGB", "HCT", "MCV", "PLT" in the last 168 hours.    BMP:  No results for input(s): "GLU", "NA", "K", "CL", "CO2", "BUN", "CREATININE", "CALCIUM", "MG" in the last 24 hours.  LFT:  Lab Results   Component Value Date    AST 47 (H) 04/29/2025    ALKPHOS 107 04/29/2025    BILITOT 0.5 04/29/2025     Albumin:   Albumin   Date Value Ref Range Status   04/29/2025 2.2 (L) 3.5 - 5.2 g/dL Final   02/18/2025 2.8 (L) 3.5 - 5.2 g/dL Final     Protein:   Protein Total   Date Value Ref Range Status   04/29/2025 5.4 (L) 6.0 - 8.4 gm/dL Final     Total Protein   Date Value Ref Range Status   02/18/2025 " 6.0 6.0 - 8.4 g/dL Final     Lactic acid:   Lab Results   Component Value Date    LACTATE 1.6 04/21/2025    LACTATE 0.8 04/20/2025       Significant Imaging: None    Steph Ramos MD  Palliative Medicine  [facility] - H[facility]ice and Palliative Medicine

## 2025-05-06 NOTE — CHAPLAIN
Palliative Care        Patient: Misa Barajas  MRN: [id number]  : 1945  Age: 79 y.o.  Hospital Length of Stay: 30  Code Status: Code Status Discussion Note  Attending Provider: Steph Ramos MD  Principal Problem: Palliative care encounter     What prompted this initial visit?:   Visited pall med pt as a part of [facility] rounding- 10 min     Who was present during my visit:   Pt as alone this visit     Non-clinical observations of patient/family or room:  Pt sleeping, lightly snoring, wearing her glasses as she lay flat; TV on and bed angled; pt appeared to be comfortable and in acute physical distress; seemed to awake a little as I spoke to her.     Feelings (Emotions/Fears/Experiences/Coping):   Provided compassionate presence and tender touch, reminding pt she is loved and being well taken care of. Items in room evidence that family has been present. Told pt what the day and date is and that our goal is to keep her comfortable. I left my card for family.     Megan (Beliefs/Meaning/Philosophy/Distress):   [facility] says pt is [employer] and Fr. Case has visited as well as [place]  Dave; I said a short blessing over her followed by the [employer] pradesiree.     Future (Spiritual Care Plan of Action):   Palliative  will continue to follow as needed. Lord, in your mercy.     Oscar,       Rev. Audrey Mendoza MDiv, [facility]  Board Certified Palliative Care   Palliative Medicine Department, 9th Floor Clinic [facility] [place]    Office: [phone] ([phone])  ** If you call and I don't answer, please leave a voicemail because vmail is forwarded to me    Email: [email or web address]  Work hours: [id number] [phone]  ** Evening, overnight and weekends, please dial [phone] which is carried by an in-house Spiritual Care  at all hours.     The care I provide is shaped by the Code of Ethics of the [employer]

## 2025-05-06 NOTE — PLAN OF CARE
[facility]  Discharge Reassessment    Primary Care Provider: Isela Langston MD    Expected Discharge Date:     Reassessment (most recent)       Discharge Reassessment - 25 1344          Discharge Reassessment    Assessment Type Discharge Planning Reassessment     Did the patient's condition or plan change since previous assessment? Yes     Communicated NOHEMI with patient/caregiver Date not available/Unable to determine     Discharge Plan A Inpatient Hospice     Discharge Plan B Other     DME Needed Upon Discharge  none     Transition of Care Barriers None     Why the patient remains in the hospital Requires continued medical care                   Per Dr Ramos, pt is currently unresponsive and is expected to  on the unit.  Discharge Plan A and Plan B have been determined by review of patient's clinical status, future medical and therapeutic needs, and coverage/benefits for post-acute care in coordination with multidisciplinary team members.  Will continue to follow.      Karey Castelan LMSW  [facility]  [phone]

## 2025-05-06 NOTE — SUBJECTIVE & OBJECTIVE
Interval History: Patient resting comfortably. Minimal prns. No longer interacting. Family at bedside     PRNs in past 24 hours   Ativan 0.5mg IV x 0 , morphine 4mg IV x 2    Medications:  Continuous Infusions:  Scheduled Meds:      PRN Meds:  Current Facility-Administered Medications:     acetaminophen, 650 mg, Oral, Q6H PRN    glycopyrrolate, 0.4 mg, Intravenous, Q4H PRN    lorazepam, 0.5 mg, Intravenous, Q30 Min PRN    morphine, 4 mg, Intravenous, Q1H PRN    nitroGLYCERIN, 0.4 mg, Sublingual, Q5 Min PRN    ondansetron, 8 mg, Oral, Q8H PRN    polyethylene glycol, 17 g, Oral, BID PRN    senna, 8.6 mg, Oral, Daily PRN    simethicone, 1 tablet, Oral, TID PRN    Objective:     Vital Signs (Most Recent):  Temp: 97 °F (36.1 °C) (05/05/25 2000)  Pulse: 61 (05/06/25 0743)  Resp: 12 (05/06/25 0743)  BP: 134/63 (05/06/25 0743)  SpO2: 96 % (05/06/25 0743) Vital Signs (24h Range):  Temp:  [97 °F (36.1 °C)] 97 °F (36.1 °C)  Pulse:  [61-73] 61  Resp:  [10-16] 12  SpO2:  [94 %-96 %] 96 %  BP: (134-149)/(63-83) 134/63     Weight: 70.5 kg (155 lb 6.8 oz)  Body mass index is 26.68 kg/m².       Physical Exam  Vitals and nursing note reviewed.   Constitutional:       General: She is not in acute distress.  Cardiovascular:      Rate and Rhythm: Normal rate.   Pulmonary:      Effort: No respiratory distress.   Skin:     General: Skin is warm.   Neurological:      Comments: Unresponsive            Review of Symptoms      Symptom Assessment (ESAS 0-10 Scale)  Pain:  0  Dyspnea:  0  Anxiety:  0  Nausea:  0  Depression:  0  Anorexia:  0  Fatigue:  0  Insomnia:  0  Restlessness:  0  Agitation:  0         Psychosocial/Cultural:   See Palliative Psychosocial Note: Yes  **Primary  to Follow**  Palliative Care  Consult: Yes        Advance Care Planning   Advance Directives:   Living Will: Yes        Copy on chart: Yes    Do Not Resuscitate Status: Yes    Medical Power of : Yes    Goals of Care: The patient and  "family endorses that what is most important right now is to focus on comfort and QOL     Accordingly, we have decided that the best plan to meet the patient's goals includes pivot to comfort-focused care           Significant Labs: None  CBC:   No results for input(s): "WBC", "HGB", "HCT", "MCV", "PLT" in the last 168 hours.    BMP:  No results for input(s): "GLU", "NA", "K", "CL", "CO2", "BUN", "CREATININE", "CALCIUM", "MG" in the last 24 hours.  LFT:  Lab Results   Component Value Date    AST 47 (H) 04/29/2025    ALKPHOS 107 04/29/2025    BILITOT 0.5 04/29/2025     Albumin:   Albumin   Date Value Ref Range Status   04/29/2025 2.2 (L) 3.5 - 5.2 g/dL Final   02/18/2025 2.8 (L) 3.5 - 5.2 g/dL Final     Protein:   Protein Total   Date Value Ref Range Status   04/29/2025 5.4 (L) 6.0 - 8.4 gm/dL Final     Total Protein   Date Value Ref Range Status   02/18/2025 6.0 6.0 - 8.4 g/dL Final     Lactic acid:   Lab Results   Component Value Date    LACTATE 1.6 04/21/2025    LACTATE 0.8 04/20/2025       Significant Imaging: None  "

## 2025-05-06 NOTE — ASSESSMENT & PLAN NOTE
Impression:  Misa Barajas is a 79 y.o. female with PMHx of COPD on 2L home O2, HFpEF, AFL s/p RFA (6/17/24 w/ Dr. Church), CKD III, HTN, HLD, chronic venous insufficiency, orthostatic hypotension who was brought via EMS after per history she was found confused and bradycardiac with HR in the 20s. Patient was percutaneously paced. During hospitalization, was intubated then extubated with residual hoarseness of voice (ENT following), required intermittent pacing by TVP, a Micra PM by EP placed 04/10, aggressive diuresing, recurrent sepsis and hypothermia, patient has required several trials of antibiotics, anticipating WBC scan on 4/25 (ID following), and presumed PE (unable to verify with CTA secondary to MARQUEZ).      Of note, patient has had 7x ED visits in the last year. Patient has engaged in goals of care discussions with Dr. Velasquez of Palliative Medicine for goals of care and symptom management. Palliative Medicine was consulted by primary, Dr. Antunez, for goals of care and advanced care planning discussions.    Patient is currently resting in bed, AAOx4, daughters (Sirisha and Esther) at bedside. Patient and family are amenable to Pal Med. Pal Med APRN and LCSW at bedside for goals of care discussions.     Transferred to [facility] on 4/29 after transitioning to comfort focused care.    - Prior experience with serious illness: yes  -The patient has previously engaged in advance care planning or GOC discussions  - Insight/Understanding of illness: yes      Advance Care Planning     Date: 04/29/2025  - Family meeting at bedside with Esther Grimm, Dr. Antunez, and HAFSA Hdz DNP.   - Philosophies of hospice reviewed.   - Patient and family amenable to pivoting to comfort-focus care and transferring to [facility].  - Case discussed with [facility] Attending, Dr. Mayo and case management team.   - Transfer orders placed. Elyssa notified via telephone.     Date: 04/28/2025  - Patient resting in bed, 4L O2, jeffrey hugger warming  "blanket in place, no acute distress upon my visit. Patient's daughters arrive at bedside shortly after.   - Daughters requested we meet outside of room so that we do not disturb patient's rest. I met with patient's daughter in 3rd floor Visitor's Garden.  - We discussed, patient's recent hospital encounters, current hospitalization, and patient's progressive health decline.   - Daughters acknowledge patient's journey in/out of hospital for past year has been tiring and exhausting and are interested in exploring hospice options. Sirisha states she has been trying to juggle working and caring for her mom after her work day, but is limited in the care that she gives her mom because she has no past medical background. Esther states she has been fortunate to be able to work from home to be with her mom more often. Both daughters acknowledged the emotional strain caring for their mom has caused. Daughters state when something "goes wrong", they try to do as much as they can (limited to checking a blood pressure, O2 sats, and giving a dose of zofran). Once they have exhausted those interventions, they call 911 because they know they are "in over their heads" to get additional help. Patient is then admitted to hospital for additional workup. Sirisha states that it has been so traumatizing when she sees her mom is sick, tries to do everything she can to care for her mom, notices it is not working, and hears her mom begging for help, but refuses to go to the hospital. This has created immense stress on the daughters. Patient never wants to go to the hospital, but eventually agrees to come to ED because either daughters convince her or because patient becomes too sick to independently make a decision. Sirisha states she is worried that she is "giving up" on her mom even though patient has been "fighting" for so long. Philosophies of hospice introduced and discussed. Esther states she has been caring for and thinking about how her " "mom has progressively declined over the last year and expresses desires to prioritize patient's comfort knowing that with comfort being the priority, life will be limited. Daughters acknowledged patient wants to go home, but state they do not have the resources to continue to care for the patient at home. Daughters inquired about inpatient hospice unit, discussed in-patient hospice criteria. During conversation, daughters became appropriately tearful. Emotional support provided. Daughters asked for additional time to process steps forward and where the patient would continued to be cared for.   - After speaking with the daughters, I rounded on the patient again. Patient was awake and amenable to Pal Med. Patient acknowledges her health decline and states it has been taxing and tiring as she has had multiple hospitalization, including the current encounter. Patient agrees that her comfort has not been prioritized over the last year and if open to talking more about hospice care. Philosophies of hospice were introduced and discussed. Patient is accepting, and states she would like to go home. I informed patient that daughters expressed concerns being able to care for patient and would like to talk more about facility placement/in-patient hospice unit. Patient expressed that she was overwhelmed with the conversation and describes it as "a lot." Patient acknowledges she was not expecting to be having this [hospice] conversation so soon, but understands that we need to have it because she has not had much quality of life lately. Patient states she would like additional time to talk with her daughters.   - Tentative family meeting 4/29/30 at 0900. Primary team, Dr. Antunez, updated via secure chat.    Date: 04/25/2025  - No acute events overnight. Patient anticipates NM Inflammatory Whole Body scan today.  - Revisited goals of care discussion from yesterday. Patient acknowledged it was a difficult conversation to have, but " has been open to it. As we anticipate more information from the pending studies, goals of care discussions are also pending. Patient expressed concerns regarding nuclear scan and when it will be getting done. Patient states she thought it would have been done by now. I followed up with radiology to inquire about scan time, and updated patient on the 1-3pm time frame that radiology hopes to see patient. Patient's daughter Esther was at bedside and updated.   - Patient with no needs at this time.  - Primary team updated.                  05/04/2025    [facility]  I engaged the patient and family in a voluntary conversation about advance care planning and we specifically addressed what the goals of care would be moving forward, in light of the patient's change in clinical status, specifically recurrent hospitalizations over past year, hypothermia, acute renal failure, CHF exacerbation, and current hosptialization.  We did specifically address the patient's likely prognosis, which appears to be poor.  We explored the patient's values and preferences for future care.  The patient and family endorses that what is most important right now is to focus on spending time at home, avoiding the hospital, and quality of life, even if it means sacrificing a little time    Accordingly, we have decided that the best plan to meet the patient's goals includes pivot to comfort-focused care           Interval: Patient becoming less responsive. No PO intake. Discussed removing supplemental O2 with family to not prolong dying process     Life Limiting Diagnosis  Acute on Chronic CHF  Acute Renal Failure on CKD stage 3b  Complete Heart Block    Symptom Management  - Pain: Morphine 4mg IV q1 hr prn     - Dyspnea: no  - on 2L at home  - maintained on 4L O2. Plan to wean supplemental O2 5/7.   -Morphine prn    Anxiety/Agitation  -Lorazepam 0.5mg IV q1h prn     Need for inpatient care- Active dying process as evidenced by loss of consciousness, cool  extremities, and irregular breathing pattern. Requiring IV opioids and benzodiazepines for pain behaviors, respiratory distress, and agitation. Likely prognosis of hours to short days     Dispo: anticipate EOL care on the unit, if patient unexpectedly stabilizes, will approach family regarding care at home or alternative in-facility hospice care

## 2025-05-07 NOTE — PLAN OF CARE
Problem: Palliative Care  Goal: Enhanced Quality of Life  Outcome: Progressing  Intervention: Maximize Comfort  Flowsheets (Taken 5/7/2025 1645)  Nutrition Interventions: (NPO) --  Pain Management Interventions:   care clustered   medication offered   pain management plan reviewed with patient/caregiver  Oral Care:   oral rinse provided   suction provided  Intervention: Optimize Psychosocial Wellbeing  Flowsheets (Taken 5/7/2025 1645)  Supportive Measures: active listening utilized  Family/Support System Care: presence promoted

## 2025-05-07 NOTE — ASSESSMENT & PLAN NOTE
Impression:  Misa Barajas is a 79 y.o. female with PMHx of COPD on 2L home O2, HFpEF, AFL s/p RFA (6/17/24 w/ Dr. Church), CKD III, HTN, HLD, chronic venous insufficiency, orthostatic hypotension who was brought via EMS after per history she was found confused and bradycardiac with HR in the 20s. Patient was percutaneously paced. During hospitalization, was intubated then extubated with residual hoarseness of voice (ENT following), required intermittent pacing by TVP, a Micra PM by EP placed 04/10, aggressive diuresing, recurrent sepsis and hypothermia, patient has required several trials of antibiotics, anticipating WBC scan on 4/25 (ID following), and presumed PE (unable to verify with CTA secondary to MARQUEZ).      Of note, patient has had 7x ED visits in the last year. Patient has engaged in goals of care discussions with Dr. Velasquez of Palliative Medicine for goals of care and symptom management. Palliative Medicine was consulted by primary, Dr. Antunez, for goals of care and advanced care planning discussions.    Patient is currently resting in bed, AAOx4, daughters (Sirisha and Esther) at bedside. Patient and family are amenable to Pal Med. Pal Med APRN and LCSW at bedside for goals of care discussions.     Transferred to [facility] on 4/29 after transitioning to comfort focused care.    - Prior experience with serious illness: yes  -The patient has previously engaged in advance care planning or GOC discussions  - Insight/Understanding of illness: yes      Advance Care Planning     Date: 04/29/2025  - Family meeting at bedside with Esther Grimm, Dr. Antunez, and HAFSA Hdz DNP.   - Philosophies of hospice reviewed.   - Patient and family amenable to pivoting to comfort-focus care and transferring to [facility].  - Case discussed with [facility] Attending, Dr. Mayo and case management team.   - Transfer orders placed. Elyssa notified via telephone.     Date: 04/28/2025  - Patient resting in bed, 4L O2, jeffrey hugger warming  "blanket in place, no acute distress upon my visit. Patient's daughters arrive at bedside shortly after.   - Daughters requested we meet outside of room so that we do not disturb patient's rest. I met with patient's daughter in 3rd floor Visitor's Garden.  - We discussed, patient's recent hospital encounters, current hospitalization, and patient's progressive health decline.   - Daughters acknowledge patient's journey in/out of hospital for past year has been tiring and exhausting and are interested in exploring hospice options. Sirisha states she has been trying to juggle working and caring for her mom after her work day, but is limited in the care that she gives her mom because she has no past medical background. Esther states she has been fortunate to be able to work from home to be with her mom more often. Both daughters acknowledged the emotional strain caring for their mom has caused. Daughters state when something "goes wrong", they try to do as much as they can (limited to checking a blood pressure, O2 sats, and giving a dose of zofran). Once they have exhausted those interventions, they call 911 because they know they are "in over their heads" to get additional help. Patient is then admitted to hospital for additional workup. Sirisha states that it has been so traumatizing when she sees her mom is sick, tries to do everything she can to care for her mom, notices it is not working, and hears her mom begging for help, but refuses to go to the hospital. This has created immense stress on the daughters. Patient never wants to go to the hospital, but eventually agrees to come to ED because either daughters convince her or because patient becomes too sick to independently make a decision. Sirisha states she is worried that she is "giving up" on her mom even though patient has been "fighting" for so long. Philosophies of hospice introduced and discussed. Esther states she has been caring for and thinking about how her " "mom has progressively declined over the last year and expresses desires to prioritize patient's comfort knowing that with comfort being the priority, life will be limited. Daughters acknowledged patient wants to go home, but state they do not have the resources to continue to care for the patient at home. Daughters inquired about inpatient hospice unit, discussed in-patient hospice criteria. During conversation, daughters became appropriately tearful. Emotional support provided. Daughters asked for additional time to process steps forward and where the patient would continued to be cared for.   - After speaking with the daughters, I rounded on the patient again. Patient was awake and amenable to Pal Med. Patient acknowledges her health decline and states it has been taxing and tiring as she has had multiple hospitalization, including the current encounter. Patient agrees that her comfort has not been prioritized over the last year and if open to talking more about hospice care. Philosophies of hospice were introduced and discussed. Patient is accepting, and states she would like to go home. I informed patient that daughters expressed concerns being able to care for patient and would like to talk more about facility placement/in-patient hospice unit. Patient expressed that she was overwhelmed with the conversation and describes it as "a lot." Patient acknowledges she was not expecting to be having this [hospice] conversation so soon, but understands that we need to have it because she has not had much quality of life lately. Patient states she would like additional time to talk with her daughters.   - Tentative family meeting 4/29/30 at 0900. Primary team, Dr. Antunez, updated via secure chat.    Date: 04/25/2025  - No acute events overnight. Patient anticipates NM Inflammatory Whole Body scan today.  - Revisited goals of care discussion from yesterday. Patient acknowledged it was a difficult conversation to have, but " has been open to it. As we anticipate more information from the pending studies, goals of care discussions are also pending. Patient expressed concerns regarding nuclear scan and when it will be getting done. Patient states she thought it would have been done by now. I followed up with radiology to inquire about scan time, and updated patient on the 1-3pm time frame that radiology hopes to see patient. Patient's daughter Esther was at bedside and updated.   - Patient with no needs at this time.  - Primary team updated.                  05/04/2025    [facility]  I engaged the patient and family in a voluntary conversation about advance care planning and we specifically addressed what the goals of care would be moving forward, in light of the patient's change in clinical status, specifically recurrent hospitalizations over past year, hypothermia, acute renal failure, CHF exacerbation, and current hosptialization.  We did specifically address the patient's likely prognosis, which appears to be poor.  We explored the patient's values and preferences for future care.  The patient and family endorses that what is most important right now is to focus on spending time at home, avoiding the hospital, and quality of life, even if it means sacrificing a little time    Accordingly, we have decided that the best plan to meet the patient's goals includes pivot to comfort-focused care           Interval: Supplemental O2 weaned off     Life Limiting Diagnosis  Acute on Chronic CHF  Acute Renal Failure on CKD stage 3b  Complete Heart Block    Symptom Management  - Pain: Morphine 4mg IV q1 hr prn     - Dyspnea: no  -Morphine prn    Anxiety/Agitation  -Lorazepam 0.5mg IV q1h prn     Need for inpatient care- Active dying process as evidenced by loss of consciousness, cool extremities, and irregular breathing pattern. Requiring IV opioids and benzodiazepines for pain behaviors, respiratory distress, and agitation. Likely prognosis of hours to  short days     Dispo: anticipate EOL care on the unit, if patient unexpectedly stabilizes, will approach family regarding care at home or alternative in-facility hospice care

## 2025-05-07 NOTE — PLAN OF CARE
Problem: Coping Ineffective  Goal: Effective Coping  Outcome: Progressing     Problem: Palliative Care  Goal: Enhanced Quality of Life  Outcome: Progressing

## 2025-05-07 NOTE — NURSING
0713 - Report received. Care assumed. Pt with eyes closed, responsive to pain and tactile stimulation. O2 weaned to 2L per NC. No s/s of pain at this time.     0970 - Dr. aRmos present on unit.    1100 - Plan of care discussed with MD.    7545 - Daughter at bedside. O2 weaned off. Pt tolerated without diff.

## 2025-05-07 NOTE — PROGRESS NOTES
[facility]  Palliative Medicine  Progress Note    Patient Name: Misa Barajas  MRN: [id number]  Admission Date: 4/6/2025  Hospital Length of Stay: 31 days  Code Status: DNR   Attending Provider: Steph Ramos MD  Consulting Provider: Steph Ramos MD  Primary Care Physician: Isela Langston MD  Principal Problem:Palliative care encounter    Patient information was obtained from patient, relative(s), and primary team.      Assessment/Plan:     Palliative Care  * Palliative care encounter  Impression:  Misa Barajas is a 79 y.o. female with PMHx of COPD on 2L home O2, HFpEF, AFL s/p RFA (6/17/24 w/ Dr. Church), CKD III, HTN, HLD, chronic venous insufficiency, orthostatic hypotension who was brought via EMS after per history she was found confused and bradycardiac with HR in the 20s. Patient was percutaneously paced. During hospitalization, was intubated then extubated with residual hoarseness of voice (ENT following), required intermittent pacing by TVP, a Micra PM by EP placed 04/10, aggressive diuresing, recurrent sepsis and hypothermia, patient has required several trials of antibiotics, anticipating WBC scan on 4/25 (ID following), and presumed PE (unable to verify with CTA secondary to MARQUEZ).      Of note, patient has had 7x ED visits in the last year. Patient has engaged in goals of care discussions with Dr. Velasquez of Palliative Medicine for goals of care and symptom management. Palliative Medicine was consulted by primary, Dr. Antunez, for goals of care and advanced care planning discussions.    Patient is currently resting in bed, AAOx4, daughters (Sirisha and Esther) at bedside. Patient and family are amenable to Pal Med. Pal Med APRN and LCSW at bedside for goals of care discussions.     Transferred to [facility] on 4/29 after transitioning to comfort focused care.    - Prior experience with serious illness: yes  -The patient has previously engaged in advance care planning or  "[facility] discussions  - Insight/Understanding of illness: yes      Advance Care Planning    Date: 04/29/2025  - Family meeting at bedside with Esther Grimm, Dr. Antunez, and HAFSA Hdz DNP.   - Philosophies of hospice reviewed.   - Patient and family amenable to pivoting to comfort-focus care and transferring to [facility].  - Case discussed with [facility] Attending, Dr. Mayo and case management team.   - Transfer orders placed. Sirisha and Esther notified via telephone.     Date: 04/28/2025  - Patient resting in bed, 4L O2, jeffrey hugger warming blanket in place, no acute distress upon my visit. Patient's daughters arrive at bedside shortly after.   - Daughters requested we meet outside of room so that we do not disturb patient's rest. I met with patient's daughter in 3rd floor Vis[facility]'s [facility].  - We discussed, patient's recent hospital encounters, current hospitalization, and patient's progressive health decline.   - Daughters acknowledge patient's journey in/out of hospital for past year has been tiring and exhausting and are interested in exploring hospice options. Sirisha states she has been trying to juggle working and caring for her mom after her work day, but is limited in the care that she gives her mom because she has no past medical background. Esther states she has been fortunate to be able to work from home to be with her mom more often. Both daughters acknowledged the emotional strain caring for their mom has caused. Daughters state when something "goes wrong", they try to do as much as they can (limited to checking a blood pressure, O2 sats, and giving a dose of zofran). Once they have exhausted those interventions, they call 911 because they know they are "in over their heads" to get additional help. Patient is then admitted to hospital for additional workup. Sirisha states that it has been so traumatizing when she sees her mom is sick, tries to do everything she can to care for her mom, notices it is not working, and hears " "her mom begging for help, but refuses to go to the hospital. This has created immense stress on the daughters. Patient never wants to go to the hospital, but eventually agrees to come to ED because either daughters convince her or because patient becomes too sick to independently make a decision. Sirisha states she is worried that she is "giving up" on her mom even though patient has been "fighting" for so long. Philosophies of hospice introduced and discussed. Esther states she has been caring for and thinking about how her mom has progressively declined over the last year and expresses desires to prioritize patient's comfort knowing that with comfort being the priority, life will be limited. Daughters acknowledged patient wants to go home, but state they do not have the resources to continue to care for the patient at home. Daughters inquired about inpatient hospice unit, discussed in-patient hospice criteria. During conversation, daughters became appropriately tearful. Emotional support provided. Daughters asked for additional time to process steps forward and where the patient would continued to be cared for.   - After speaking with the daughters, I rounded on the patient again. Patient was awake and amenable to Pal Med. Patient acknowledges her health decline and states it has been taxing and tiring as she has had multiple hospitalization, including the current encounter. Patient agrees that her comfort has not been prioritized over the last year and if open to talking more about hospice care. Philosophies of hospice were introduced and discussed. Patient is accepting, and states she would like to go home. I informed patient that daughters expressed concerns being able to care for patient and would like to talk more about facility placement/in-patient hospice unit. Patient expressed that she was overwhelmed with the conversation and describes it as "a lot." Patient acknowledges she was not expecting to be having " this [hospice] conversation so soon, but understands that we need to have it because she has not had much quality of life lately. Patient states she would like additional time to talk with her daughters.   - Tentative family meeting 4/29/30 at 0900. Primary team, Dr. Antunez, updated via secure chat.    Date: 04/25/2025  - No acute events overnight. Patient anticipates NM Inflammatory Whole Body scan today.  - Revisited goals of care discussion from yesterday. Patient acknowledged it was a difficult conversation to have, but has been open to it. As we anticipate more information from the pending studies, goals of care discussions are also pending. Patient expressed concerns regarding nuclear scan and when it will be getting done. Patient states she thought it would have been done by now. I followed up with radiology to inquire about scan time, and updated patient on the 1-3pm time frame that radiology hopes to see patient. Patient's daughter Esther was at bedside and updated.   - Patient with no needs at this time.  - Primary team updated.                  05/04/2025    [facility]  I engaged the patient and family in a voluntary conversation about advance care planning and we specifically addressed what the goals of care would be moving forward, in light of the patient's change in clinical status, specifically recurrent hospitalizations over past year, hypothermia, acute renal failure, CHF exacerbation, and current hosptialization.  We did specifically address the patient's likely prognosis, which appears to be poor.  We explored the patient's values and preferences for future care.  The patient and family endorses that what is most important right now is to focus on spending time at home, avoiding the hospital, and quality of life, even if it means sacrificing a little time    Accordingly, we have decided that the best plan to meet the patient's goals includes pivot to comfort-focused care           Interval: Supplemental O2  "weaned off     Life Limiting Diagnosis  Acute on Chronic CHF  Acute Renal Failure on CKD stage 3b  Complete Heart Block    Symptom Management  - Pain: Morphine 4mg IV q1 hr prn     - Dyspnea: no  -Morphine prn    Anxiety/Agitation  -Lorazepam 0.5mg IV q1h prn     Need for inpatient care- Active dying process as evidenced by loss of consciousness, cool extremities, and irregular breathing pattern. Requiring IV opioids and benzodiazepines for pain behaviors, respiratory distress, and agitation. Likely prognosis of hours to short days     Dispo: anticipate EOL care on the unit, if patient unexpectedly stabilizes, will approach family regarding care at home or alternative in-facility hospice care                        Subjective:     Chief Complaint:   Chief Complaint   Patient presents with    Bradycardia     Arrives via EMS hypotensive 80s/50s and bradycadic in the 20s.       HPI:   As per H&P, "Misa Barajas is a 79 y.o. female COPD on 2L home O2, HFpEF, AFL s/p RFA (6/17/24 w/ Dr. Church), CKD III, HTN, HLD, chronic venous insufficiency, orthostatic hypotension who was brought via EMS after per history she was found confused and bradycardiac with HR in the 20s.They started epi and transcutaneous pacing. Given Versed for the transcutaneous pacing. They had to bag her in route. We do not have strip of underlying rhythm. Patient obtunded for which she was intubated for airway protection on arrival to the ED and transcutaneous pacing was exchanged to transvenous pacing via RIJ (threshold 0.6). Underlying rate 44 bifascicular block. Known RBBB. Per daughters patient had called them over the phone when they noticed she appeared confused. Due to concerns for a possible syncopal episode EMS was called. Prior to event patient had been complaining of malaise and nausea for which she took about two Zofran pills per the daughters.      She had been recently seen on 3/27/25 at the [facility] visit following recent admission for " AHRF 2/2 ADHF and COPD exacerbation. On ED presentation BNP elevated to 2500, troponin elevated to 19, CXR with pulmonary edema. She was started on steroids, nebs and antibiotics in addition to IV diuresis. Updated TTE stable from prior, showing HFpEF with elevated CVP 15. Her O2 requirements improved to baseline with diuresis and she was subsequently discharged. At that visit patient had not required midodrine as blood pressure had remained stable and she was asymptomatic.      Off note, patient was taken off OAC due to bleeding issues and no recurrent of typical atrial flutter after ablation.      Overview/Hospital Course:  Admitted to CCU for CHB in s/o hyperkalemia & in the presence of conduction disease/RBBB & LAHB - stable w/ temporary wire. ??Septic shock deemed to be d/t UTI - ?? UA with only 7 WBCs); vasopressors weaned off. HTN; started NG gtt. Acute on chronic hypoxic/hypercapnic RF & obtunded; intubated. Successfully extubated 04/08. MARQUEZ on CKD; improved w/ good UOP response to diuresis. PVCs w/ intermittent pacing by TVP. Switched pip-tazo to amox-clav on 04/09; continued to improve; empiric EED 04/13 for 7 day course. Micra PM by EP placed 04/10. Medically stable for stepdown. Patient was extremely short of breath with physical therapy, chest x-ray concerning for pulmonary edema.  Started patient on IV diuresis, now euvolemic.  ENT was consulted yesterday due to hoarseness of voice.  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 Pt uninterested in any procedure at this time, ENT recommended SLP follow up.  Able to tolerate regular diet at this point.  Now agreeable for SNF, working on placement. Course c/b worsening hypoxia & hyperNa.       Overnight 4/18-4/19, decompensated with recurrent sepsis, hypothermia 92, possible PNA with infiltrates on CXR, Zosyn IV started. Remained hypothermic despite Zosyn x 2 doses and Manny hugger in place (pt uncomfortable/hot and  "asking for removal), so added Vanc IV STAT. Checked TSH and FT4-  wnl. UA with pyuria however not a clean catch w 33 sq epi, so repeated UA. C/o "stomach" pain, overall picture with the "stomach" pain and septic picture is "exactly what led us to ICU 2 weeks ago" per daughter. +TTP RUQ, RLQ, LLQ.   Poor UOP, only 200cc by mid-shift, and still hyperNa with FWD 1.8L so started gentle hydration with D5W @ 75cc for 24 hrs. May need to diurese again after sepsis resolves. CT C/A/P noncon with pulm edema, B pleural effusions, possible PNA, no intra-abdominal process seen.      On 4/21, Mental status much improved, however still with mild confusion and feels very tired.   decompensated further with recurrent MARQUEZ, C/o new SOB and hypoxemic 90% on 4L (over her baseline of 2L at home), improved to >94% on 6L with improvement of SOB.  C/o new L chest pressure/pain 4/10 (non-radiating), c/f MI or PE, resolved after nitro SL x 1 however HypoT with SBP upper 80s so will avoid further nitro.  HS trop 38 (decreased from prior), BNP 700s (increased from prior).CXR with pulm edema, all most c/w ADHF- Lasix 40 IV x 1 given.    Concern for untreated sepsis with ongoing hypothermia requiring jeffrey hugger despite Vanc/Zosyn for over 24 hours, so broadened to Vanc/Sher/Dinah, consulted ID, repeated BCx x 2. Lactate wnl.  Re: concern for PE, Known LIJ DVT, apixaban x several days then stopped (likely for hemorrhage of vocal fold)- discussed w ENT 4/20, ok to resume anticoag if needed. PE workup- F/u STAT UE and LE Dopplers. Unable to check CTA chest given MARQUEZ, unable to check V/Q given pulm edema. Anticoagulation with Apixaban.  Hypothermia returned however pt looks and feels the best she has in 4 days.  Voice is getting stronger.  Sitting in chair.  UCx resulted with ESBL Klebsiella, so Sher has been good coverage.  ID changed to Dapto (from Vanc), continuing Sher, for coverage of UTI and well as PNA.  BCx have all been negative.  " "Continuing Eliquis for DVt LUE and presumed PE.  Acute hypox resp failure 2/2 ADHF and presumed PE, still on 6L, dosing Lasix IV PRN daily given yet-unclear status of her sepsis and avoiding volume depletion in sepsis.      Prior to admit, was independent in her residence with her cat.  Two daughters very supportive.         Interval History: Pt seen and examined this morning on rounds. Hypothermia ongoing this morning, required Manny hugger.  Resolved in afternoon.  If hypothermia returns overnight, will repeat BCx x 2.  Attempting sputum sample for Cx as well, not optimistic for any yield. NM Full body tagged WBC scan per ID recs- will get injection tomorrow 4/24 then scan on 4/25.      Re-dosed Lasix at higher dose 80mg IV x 1, re-eval again tomorrow.            Care plan reviewed. Otherwise, doing well and with no further complaints at this time."        Hospital Course:  No notes on file    Interval History: Nursing notes reviewed. Patient resting comfortably. Minimal prns. No longer interacting. Family at bedside     PRNs in past 24 hours   Ativan 0.5mg IV x 0 , morphine 4mg IV x 2    Medications:  Continuous Infusions:  Scheduled Meds:      PRN Meds:  Current Facility-Administered Medications:     acetaminophen, 650 mg, Oral, Q6H PRN    glycopyrrolate, 0.4 mg, Intravenous, Q4H PRN    lorazepam, 0.5 mg, Intravenous, Q30 Min PRN    morphine, 4 mg, Intravenous, Q1H PRN    nitroGLYCERIN, 0.4 mg, Sublingual, Q5 Min PRN    ondansetron, 8 mg, Oral, Q8H PRN    polyethylene glycol, 17 g, Oral, BID PRN    senna, 8.6 mg, Oral, Daily PRN    simethicone, 1 tablet, Oral, TID PRN    Objective:     Vital Signs (Most Recent):  Temp: 97.9 °F (36.6 °C) (05/07/25 0721)  Pulse: 71 (05/07/25 0721)  Resp: (!) 22 (05/07/25 1247)  BP: 131/63 (05/07/25 0721)  SpO2: (!) 94 % (05/07/25 0721) Vital Signs (24h Range):  Temp:  [96.9 °F (36.1 °C)-97.9 °F (36.6 °C)] 97.9 °F (36.6 °C)  Pulse:  [67-71] 71  Resp:  [12-22] 22  SpO2:  [94 %-96 %] " "94 %  BP: (131-133)/(62-63) 131/63     Weight: 70.5 kg (155 lb 6.8 oz)  Body mass index is 26.68 kg/m².       Physical Exam  Vitals and nursing note reviewed.   Constitutional:       General: She is not in acute distress.  Cardiovascular:      Rate and Rhythm: Normal rate.   Pulmonary:      Effort: No respiratory distress.   Skin:     General: Skin is warm.   Neurological:      Comments: Unresponsive            Review of Symptoms      Symptom Assessment (ESAS 0-10 Scale)  Pain:  0  Dyspnea:  0  Anxiety:  0  Nausea:  0  Depression:  0  Anorexia:  0  Fatigue:  0  Insomnia:  0  Restlessness:  0  Agitation:  0         Psychosocial/Cultural:   See Palliative Psychosocial Note: Yes  **Primary  to Follow**  Palliative Care  Consult: Yes        Advance Care Planning  Advance Directives:   Living Will: Yes        Copy on chart: Yes    Do Not Resuscitate Status: Yes    Medical Power of : Yes    Goals of Care: The patient and family endorses that what is most important right now is to focus on comfort and QOL     Accordingly, we have decided that the best plan to meet the patient's goals includes pivot to comfort-focused care           Significant Labs: None  CBC:   No results for input(s): "WBC", "HGB", "HCT", "MCV", "PLT" in the last 168 hours.    BMP:  No results for input(s): "GLU", "NA", "K", "CL", "CO2", "BUN", "CREATININE", "CALCIUM", "MG" in the last 24 hours.  LFT:  Lab Results   Component Value Date    AST 47 (H) 04/29/2025    ALKPHOS 107 04/29/2025    BILITOT 0.5 04/29/2025     Albumin:   Albumin   Date Value Ref Range Status   04/29/2025 2.2 (L) 3.5 - 5.2 g/dL Final   02/18/2025 2.8 (L) 3.5 - 5.2 g/dL Final     Protein:   Protein Total   Date Value Ref Range Status   04/29/2025 5.4 (L) 6.0 - 8.4 gm/dL Final     Total Protein   Date Value Ref Range Status   02/18/2025 6.0 6.0 - 8.4 g/dL Final     Lactic acid:   Lab Results   Component Value Date    LACTATE 1.6 04/21/2025    LACTATE " 0.8 04/20/2025       Significant Imaging: None    Steph Ramos MD  Palliative Medicine  [facility] - Hospice and Palliative Medicine

## 2025-05-07 NOTE — SUBJECTIVE & OBJECTIVE
Interval History: Nursing notes reviewed. Patient resting comfortably. Minimal prns. No longer interacting. Family at bedside     PRNs in past 24 hours   Ativan 0.5mg IV x 0 , morphine 4mg IV x 2    Medications:  Continuous Infusions:  Scheduled Meds:      PRN Meds:  Current Facility-Administered Medications:     acetaminophen, 650 mg, Oral, Q6H PRN    glycopyrrolate, 0.4 mg, Intravenous, Q4H PRN    lorazepam, 0.5 mg, Intravenous, Q30 Min PRN    morphine, 4 mg, Intravenous, Q1H PRN    nitroGLYCERIN, 0.4 mg, Sublingual, Q5 Min PRN    ondansetron, 8 mg, Oral, Q8H PRN    polyethylene glycol, 17 g, Oral, BID PRN    senna, 8.6 mg, Oral, Daily PRN    simethicone, 1 tablet, Oral, TID PRN    Objective:     Vital Signs (Most Recent):  Temp: 97.9 °F (36.6 °C) (05/07/25 0721)  Pulse: 71 (05/07/25 0721)  Resp: (!) 22 (05/07/25 1247)  BP: 131/63 (05/07/25 0721)  SpO2: (!) 94 % (05/07/25 0721) Vital Signs (24h Range):  Temp:  [96.9 °F (36.1 °C)-97.9 °F (36.6 °C)] 97.9 °F (36.6 °C)  Pulse:  [67-71] 71  Resp:  [12-22] 22  SpO2:  [94 %-96 %] 94 %  BP: (131-133)/(62-63) 131/63     Weight: 70.5 kg (155 lb 6.8 oz)  Body mass index is 26.68 kg/m².       Physical Exam  Vitals and nursing note reviewed.   Constitutional:       General: She is not in acute distress.  Cardiovascular:      Rate and Rhythm: Normal rate.   Pulmonary:      Effort: No respiratory distress.   Skin:     General: Skin is warm.   Neurological:      Comments: Unresponsive            Review of Symptoms      Symptom Assessment (ESAS 0-10 Scale)  Pain:  0  Dyspnea:  0  Anxiety:  0  Nausea:  0  Depression:  0  Anorexia:  0  Fatigue:  0  Insomnia:  0  Restlessness:  0  Agitation:  0         Psychosocial/Cultural:   See Palliative Psychosocial Note: Yes  **Primary  to Follow**  Palliative Care  Consult: Yes        Advance Care Planning   Advance Directives:   Living Will: Yes        Copy on chart: Yes    Do Not Resuscitate Status: Yes    Medical  "Power of : Yes    Goals of Care: The patient and family endorses that what is most important right now is to focus on comfort and QOL     Accordingly, we have decided that the best plan to meet the patient's goals includes pivot to comfort-focused care           Significant Labs: None  CBC:   No results for input(s): "WBC", "HGB", "HCT", "MCV", "PLT" in the last 168 hours.    BMP:  No results for input(s): "GLU", "NA", "K", "CL", "CO2", "BUN", "CREATININE", "CALCIUM", "MG" in the last 24 hours.  LFT:  Lab Results   Component Value Date    AST 47 (H) 04/29/2025    ALKPHOS 107 04/29/2025    BILITOT 0.5 04/29/2025     Albumin:   Albumin   Date Value Ref Range Status   04/29/2025 2.2 (L) 3.5 - 5.2 g/dL Final   02/18/2025 2.8 (L) 3.5 - 5.2 g/dL Final     Protein:   Protein Total   Date Value Ref Range Status   04/29/2025 5.4 (L) 6.0 - 8.4 gm/dL Final     Total Protein   Date Value Ref Range Status   02/18/2025 6.0 6.0 - 8.4 g/dL Final     Lactic acid:   Lab Results   Component Value Date    LACTATE 1.6 04/21/2025    LACTATE 0.8 04/20/2025       Significant Imaging: None  "

## 2025-05-08 NOTE — CHAPLAIN
Palliative Care        Patient: Misa Barajas  MRN: [id number]  : 1945  Age: 79 y.o.  Hospital Length of Stay: 32  Code Status: Code Status Discussion Note  Attending Provider: Steph Ramos MD  Principal Problem: Palliative care encounter     Followed back up to check on grieving family and another daughter had arrived; provided emotional support and they asked for prayer as I committed their mother's spirit to her eternal home; this appeared to give family comfort. One daughter asked if I believed animals had souls and I said that is my belief, but not all [employer]ism loyd traditions. They are big animal family and they found comfort that their mom's spirit would be reunited with the many pets they have.    When they were ready I talked about next steps. They have chosen [facility] as their FH/crematorium. I will let [place] folks know. They are still waiting for an aunt and/or uncle to come pay respect. Told them to let the nurses station know when they are all gone and to call Rj. I said our [place] dept may call them and the family as well just to confirm.     Gave them the grief booklet.    Lord, in your mercy.      Oscar,       Rev. Audrey Mendoza MDiv, [facility]  Board Certified Palliative Care   Palliative Medicine Department, 9th Floor [facility] [place]    Office: [phone] (x [phone])  ** If you call and I don't answer, please leave a voicemail because vmail is forwarded to me    Email: [email or web address]  Work hours: M-F [phone]  ** Evening, overnight and weekends, please dial [phone] which is carried by an in-house Spiritual Care  at all hours.     The care I provide is shaped by the Code of Ethics of the [employer] Professional Chaplains

## 2025-05-08 NOTE — CHAPLAIN
Palliative Care        Patient: Misa Barajas  MRN: [id number]  : 1945  Age: 79 y.o.  Hospital Length of Stay: 32  Code Status: Code Status Discussion Note  Attending Provider: Steph Ramos MD  Principal Problem: Palliative care encounter     What prompted this initial visit?:   [place] from U doctor that pt      Who was present during my visit:   Present when one daughter, Iqra, arrived, along with her .     Feelings (Emotions/Fears/Experiences/Coping):   Provided comfort and grief support as daughter crying; provided listening ear as she lamented that she wanted to be present when her mom . Present when Dr. Prasad came in the room and family thanked her for the care provided. Together we explained that sometimes the dying person does not want family around when they die-- but she knows her mom best; extended karin to herself for not being present, knowing her mom was aware of all her visits and love for her.     Fetched water for family; giving them time alone, then will return when more expected family to arrive and will explain next steps when they are ready. Assured them there is no rush.      Future (Spiritual Care Plan of Action):   SC  brought decedent care ppwk and I will return with the grief booklet. Lord, in your mercy.     Oscar,       Rev. Audrey Mendoza MDiv, [facility]  Board Certified Palliative Care   Palliative Medicine Department, 9th Floor Clinic [facility] [place]    Office: [phone] ([phone])  ** If you call and I don't answer, please leave a voicemail because vmail is forwarded to me    Email: [email or web address]  Work hours: M-[phone]  ** Evening, overnight and weekends, please dial [phone] which is carried by an in-house Spiritual Care  at all hours.     The care I provide is shaped by the Code of Ethics of the [employer]

## 2025-05-08 NOTE — SIGNIFICANT EVENT
Called to bedside. Patient with no HR, RR, or pupillary response. Patient pronounced dead at 8:30. Family notified

## 2025-05-08 NOTE — DISCHARGE SUMMARY
"[facility]  Palliative Medicine  Discharge Summary      Patient Name: Misa Barajas  MRN: [id number]  Admission Date: 4/6/2025  Hospital Length of Stay: 32 days  Discharge Date and Time: 5/8/2025 8:30  Attending Physician: Steph Ramos MD   Discharging Provider: Steph Ramos MD  Primary Care Provider: Isela Langston MD    HPI:   As per H&P, "Misa Barajas is a 79 y.o. female COPD on 2L home O2, HFpEF, AFL s/p RFA (6/17/24 w/ Dr. Church), CKD III, HTN, HLD, chronic venous insufficiency, orthostatic hypotension who was brought via EMS after per history she was found confused and bradycardiac with HR in the 20s.They started epi and transcutaneous pacing. Given Versed for the transcutaneous pacing. They had to bag her in route. We do not have strip of underlying rhythm. Patient obtunded for which she was intubated for airway protection on arrival to the ED and transcutaneous pacing was exchanged to transvenous pacing via RIJ (threshold 0.6). Underlying rate 44 bifascicular block. Known RBBB. Per daughters patient had called them over the phone when they noticed she appeared confused. Due to concerns for a possible syncopal episode EMS was called. Prior to event patient had been complaining of malaise and nausea for which she took about two Zofran pills per the daughters.      She had been recently seen on 3/27/25 at the [facility] visit following recent admission for AHRF 2/2 ADHF and COPD exacerbation. On ED presentation BNP elevated to 2500, troponin elevated to 19, CXR with pulmonary edema. She was started on steroids, nebs and antibiotics in addition to IV diuresis. Updated TTE stable from prior, showing HFpEF with elevated CVP 15. Her O2 requirements improved to baseline with diuresis and she was subsequently discharged. At that visit patient had not required midodrine as blood pressure had remained stable and she was asymptomatic.      Off note, patient was taken off " "OAC due to bleeding issues and no recurrent of typical atrial flutter after ablation.      Overview/Hospital Course:  Admitted to CCU for CHB in s/o hyperkalemia & in the presence of conduction disease/RBBB & LAHB - stable w/ temporary wire. ??Septic shock deemed to be d/t UTI - ?? UA with only 7 WBCs); vasopressors weaned off. HTN; started NG gtt. Acute on chronic hypoxic/hypercapnic RF & obtunded; intubated. Successfully extubated 04/08. MARQUEZ on CKD; improved w/ good UOP response to diuresis. PVCs w/ intermittent pacing by TVP. Switched pip-tazo to amox-clav on 04/09; continued to improve; empiric EED 04/13 for 7 day course. Micra PM by EP placed 04/10. Medically stable for stepdown. Patient was extremely short of breath with physical therapy, chest x-ray concerning for pulmonary edema.  Started patient on IV diuresis, now euvolemic.  ENT was consulted yesterday due to hoarseness of voice.  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 Pt uninterested in any procedure at this time, ENT recommended SLP follow up.  Able to tolerate regular diet at this point.  Now agreeable for SNF, working on placement. Course c/b worsening hypoxia & hyperNa.       Overnight 4/18-4/19, decompensated with recurrent sepsis, hypothermia 92, possible PNA with infiltrates on CXR, Zosyn IV started. Remained hypothermic despite Zosyn x 2 doses and Manny hugger in place (pt uncomfortable/hot and asking for removal), so added Vanc IV STAT. Checked TSH and FT4-  wnl. UA with pyuria however not a clean catch w 33 sq epi, so repeated UA. C/o "stomach" pain, overall picture with the "stomach" pain and septic picture is "exactly what led us to ICU 2 weeks ago" per daughter. +TTP RUQ, RLQ, LLQ.   Poor UOP, only 200cc by mid-shift, and still hyperNa with FWD 1.8L so started gentle hydration with D5W @ 75cc for 24 hrs. May need to diurese again after sepsis resolves. CT C/A/P noncon with pulm edema, B pleural " effusions, possible PNA, no intra-abdominal process seen.      On , Mental status much improved, however still with mild confusion and feels very tired.   decompensated further with recurrent MARQUEZ, C/o new SOB and hypoxemic 90% on 4L (over her baseline of 2L at home), improved to >94% on 6L with improvement of SOB.  C/o new L chest pressure/pain 10 (non-radiating), c/f MI or PE, resolved after nitro SL x 1 however HypoT with SBP upper 80s so will avoid further nitro.  HS trop 38 (decreased from prior), BNP 700s (increased from prior).CXR with pulm edema, all most c/w ADHF- Lasix 40 IV x 1 given.    Concern for untreated sepsis with ongoing hypothermia requiring jeffrey hugger despite Vanc/Zosyn for over 24 hours, so broadened to Vanc/Sher/Dinah, consulted ID, repeated BCx x 2. Lactate wnl.  Re: concern for PE, Known LIJ DVT, apixaban x several days then stopped (likely for hemorrhage of vocal fold)- discussed w ENT , ok to resume anticoag if needed. PE workup- F/u STAT UE and LE Dopplers. Unable to check CTA chest given MARQUEZ, unable to check V/Q given pulm edema. Anticoagulation with Apixaban.  Hypothermia returned however pt looks and feels the best she has in 4 days.  Voice is getting stronger.  Sitting in chair.  UCx resulted with ESBL Klebsiella, so Sher has been good coverage.  ID changed to Dapto (from Vanc), continuing Sher, for coverage of UTI and well as PNA.  BCx have all been negative.  Continuing Eliquis for DVt LUE and presumed PE.  Acute hypox resp failure 2/2 ADHF and presumed PE, still on 6L, dosing Lasix IV PRN daily given yet-unclear status of her sepsis and avoiding volume depletion in sepsis.      Decision was made to transition to comfort focused care. Patient was transferred to HPU. Patient's symptoms were managed aggressively and appropriately. Patient  peacefully           Procedure(s) (LRB):  INSERTION, CARDIAC PACEMAKER, LEADLESS (N/A)  Removal, Pacemaker, Temporary Transvenous   "    Hospital Course:   No notes on file     Goals of Care Treatment Preferences:  Code Status: DNR    Health care agent: Esther Barajas  [facility] care agent number: [phone]    Living Will: Yes     What is most important right now is to focus on spending time at home, avoiding the hospital, quality of life, even if it means sacrificing a little time.  Accordingly, we have decided that the best plan to meet the patient's goals includes pivot to comfort-focused care.      Consults:   Consults (From admission, onward)          Status Ordering Provider     Inpatient consult to Palliative Care  Once        Provider:  (Not yet assigned)    Completed AXEL RAMIREZ     Inpatient consult to Orthopedics  Once        Provider:  (Not yet assigned)    Completed AXEL RAMIREZ     Inpatient consult to Infectious Diseases  Once        Provider:  (Not yet assigned)    Completed AXEL RAMIREZ     Inpatient [facility] team [facility]  Once        Provider:  (Not yet assigned)    Completed AXEL RAMIREZ     Inpatient consult to Midline team  Once        Provider:  (Not yet assigned)    Completed AXEL RAMIREZ     Inpatient consult to ENT  Once        Provider:  (Not yet assigned)    Completed DALILA FAIRBANKS     Inpatient consult to Electrophysiology  Once        Provider:  (Not yet assigned)    Completed MARLENE JACKSON     Inpatient consult to Endocrinology  Once        Provider:  (Not yet assigned)    Completed JOE GEORGES     Inpatient consult to Nephrology  Once        Provider:  (Not yet assigned)    Completed JOE GEORGES     Pharmacy to dose Vancomycin consult  Once        Provider:  (Not yet assigned)   Placed in "And" Linked Group    Completed JOE GEORGES            No new Assessment & Plan notes have been filed under this hospital service since the last note was generated.  Service: Palliative Medicine    Final Active Diagnoses:    Diagnosis Date Noted POA    PRINCIPAL " PROBLEM:  Palliative care encounter [Z51.5] 2025 Not Applicable    Comfort measures only status [Z51.5] 2025 Not Applicable    Other reduced mobility [Z74.09] 2025 Yes    Severe protein-calorie malnutrition [E43] 2025 Yes    Cystitis [N30.90] 2025 Yes    Left knee pain [M25.562] 2025 No    Hypothermia [T68.XXXA] 2025 No    Acute pulmonary embolism [I26.99] 2025 No    Acute deep vein thrombosis (DVT) of left upper extremity [I82.622] 2025 No    Left chest pressure [R07.89] 2025 No    Sepsis [A41.9] 2025 Yes    Hypernatremia [E87.0] 2025 No    Dysphonia [R49.0] 04/15/2025 No    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] 04/15/2025 No    Shock [R57.9] 2025 Yes    Subclinical hypothyroidism [E03.8] 2025 Yes    Complete heart block [I44.2] 2025 Yes    Pulmonary hypertension [I27.20] 2025 Yes    Iron deficiency anemia due to chronic blood loss [D50.0] 2024 Yes    Typical atrial flutter [I48.3] 2024 Yes    Orthostatic hypotension [I95.1] 2024 Yes    Acute on chronic congestive heart failure [I50.9] 2024 Yes    Anticoagulated [Z79.01] 2022 Not Applicable    Chronic respiratory failure with hypoxia [J96.11] 2021 Yes     Chronic    Pleural effusion [J90] 2021 Yes    Centrilobular emphysema [J43.2] 2021 Yes    CKD (chronic kidney disease) stage 3, GFR 30-59 ml/min [N18.30] 2018 Yes     Chronic    RBBB [I45.10] 2017 Yes     Chronic      Problems Resolved During this Admission:    Diagnosis Date Noted Date Resolved POA    MARQUEZ (acute kidney injury) [N17.9] 2022 Yes       Discharged Condition:     Disposition:     Follow Up:    Patient Instructions:   No discharge procedures on file.    Significant Diagnostic Studies: N/A    Pending Diagnostic Studies:       Procedure Component Value Units Date/Time    Basic metabolic panel [[id number]] Collected:  04/06/25 0833    Order Status: Sent Lab Status: No result     Specimen: Blood     Comprehensive metabolic panel - if not done in ED [[id number]] Collected: 04/09/25 0430    Order Status: Sent Lab Status: No result     Specimen: Blood     Magnesium - if not done in ED [[id number]] Collected: 04/09/25 0430    Order Status: Sent Lab Status: No result     Specimen: Blood     Phosphorus - if not done in ED [[id number]] Collected: 04/09/25 0430    Order Status: Sent Lab Status: No result     Specimen: Blood            Medications:  None    Indwelling Lines/Drains at time of discharge:   Lines/Drains/Airways       Drain  Duration                  Urethral Catheter 05/03/25 1000 Latex 16 Fr. 5 days                    Time spent on the discharge of patient: 5 minutes  Patient was seen and examined on the date of discharge and determined to be suitable for discharge.    Steph Ramos MD  Department of Palliative Medicine  [facility] - Hospice and Palliative Medicine

## 2025-05-12 NOTE — PLAN OF CARE
[facility]  Discharge Final Note    Primary Care Provider: Isela Langston MD    Expected Discharge Date: 2025    Final Discharge Note (most recent)       Final Note - 25 0845          Final Note    Assessment Type Final Discharge Note     Anticipated Discharge Disposition  in Medical Facility on Hospice                 Karey Castelan LMSW  [facility]  [id number]      Important Message from Medicare  Important Message from Medicare regarding Discharge Appeal Rights: Given to patient/caregiver, Explained to patient/caregiver, Signed/date by patient/caregiver     Date IMM was signed: 25  Time IMM was signed: 8018
